# Patient Record
Sex: FEMALE | Race: WHITE | NOT HISPANIC OR LATINO | Employment: OTHER | URBAN - METROPOLITAN AREA
[De-identification: names, ages, dates, MRNs, and addresses within clinical notes are randomized per-mention and may not be internally consistent; named-entity substitution may affect disease eponyms.]

---

## 2017-03-04 ENCOUNTER — HOSPITAL ENCOUNTER (EMERGENCY)
Facility: CLINIC | Age: 82
Discharge: HOME | End: 2017-03-04
Attending: EMERGENCY MEDICINE | Admitting: EMERGENCY MEDICINE
Payer: MEDICARE

## 2017-03-04 ENCOUNTER — APPOINTMENT (OUTPATIENT)
Dept: GENERAL RADIOLOGY | Facility: CLINIC | Age: 82
End: 2017-03-04
Attending: EMERGENCY MEDICINE
Payer: MEDICARE

## 2017-03-04 VITALS
RESPIRATION RATE: 20 BRPM | OXYGEN SATURATION: 97 % | HEART RATE: 68 BPM | BODY MASS INDEX: 26.58 KG/M2 | TEMPERATURE: 98 F | SYSTOLIC BLOOD PRESSURE: 132 MMHG | WEIGHT: 150 LBS | DIASTOLIC BLOOD PRESSURE: 96 MMHG | HEIGHT: 63 IN

## 2017-03-04 DIAGNOSIS — D64.9 ANEMIA, UNSPECIFIED TYPE: ICD-10-CM

## 2017-03-04 DIAGNOSIS — J06.9 VIRAL URI: ICD-10-CM

## 2017-03-04 DIAGNOSIS — R06.00 DYSPNEA, UNSPECIFIED TYPE: ICD-10-CM

## 2017-03-04 LAB
ANION GAP SERPL CALCULATED.3IONS-SCNC: 6 MMOL/L (ref 3–14)
BASOPHILS # BLD AUTO: 0 10E9/L (ref 0–0.2)
BASOPHILS NFR BLD AUTO: 0.3 %
BUN SERPL-MCNC: 26 MG/DL (ref 7–30)
CALCIUM SERPL-MCNC: 8.1 MG/DL (ref 8.5–10.1)
CHLORIDE SERPL-SCNC: 108 MMOL/L (ref 94–109)
CO2 SERPL-SCNC: 27 MMOL/L (ref 20–32)
CREAT SERPL-MCNC: 0.67 MG/DL (ref 0.52–1.04)
DIFFERENTIAL METHOD BLD: ABNORMAL
EOSINOPHIL # BLD AUTO: 0 10E9/L (ref 0–0.7)
EOSINOPHIL NFR BLD AUTO: 0.9 %
ERYTHROCYTE [DISTWIDTH] IN BLOOD BY AUTOMATED COUNT: 13 % (ref 10–15)
FLUAV+FLUBV AG SPEC QL: NEGATIVE
FLUAV+FLUBV AG SPEC QL: NORMAL
GFR SERPL CREATININE-BSD FRML MDRD: 85 ML/MIN/1.7M2
GLUCOSE SERPL-MCNC: 91 MG/DL (ref 70–99)
HCT VFR BLD AUTO: 30.7 % (ref 35–47)
HGB BLD-MCNC: 10.4 G/DL (ref 11.7–15.7)
IMM GRANULOCYTES # BLD: 0 10E9/L (ref 0–0.4)
IMM GRANULOCYTES NFR BLD: 0.6 %
INTERPRETATION ECG - MUSE: NORMAL
LYMPHOCYTES # BLD AUTO: 1.5 10E9/L (ref 0.8–5.3)
LYMPHOCYTES NFR BLD AUTO: 45.6 %
MCH RBC QN AUTO: 31.9 PG (ref 26.5–33)
MCHC RBC AUTO-ENTMCNC: 33.9 G/DL (ref 31.5–36.5)
MCV RBC AUTO: 94 FL (ref 78–100)
MONOCYTES # BLD AUTO: 0.3 10E9/L (ref 0–1.3)
MONOCYTES NFR BLD AUTO: 7.9 %
NEUTROPHILS # BLD AUTO: 1.4 10E9/L (ref 1.6–8.3)
NEUTROPHILS NFR BLD AUTO: 44.7 %
NRBC # BLD AUTO: 0 10*3/UL
NRBC BLD AUTO-RTO: 0 /100
PLATELET # BLD AUTO: 167 10E9/L (ref 150–450)
POTASSIUM SERPL-SCNC: 3.8 MMOL/L (ref 3.4–5.3)
RBC # BLD AUTO: 3.26 10E12/L (ref 3.8–5.2)
SODIUM SERPL-SCNC: 141 MMOL/L (ref 133–144)
SPECIMEN SOURCE: NORMAL
TROPONIN I SERPL-MCNC: NORMAL UG/L (ref 0–0.04)
WBC # BLD AUTO: 3.2 10E9/L (ref 4–11)

## 2017-03-04 PROCEDURE — 99285 EMERGENCY DEPT VISIT HI MDM: CPT | Mod: 25

## 2017-03-04 PROCEDURE — 84484 ASSAY OF TROPONIN QUANT: CPT | Performed by: EMERGENCY MEDICINE

## 2017-03-04 PROCEDURE — 85025 COMPLETE CBC W/AUTO DIFF WBC: CPT | Performed by: EMERGENCY MEDICINE

## 2017-03-04 PROCEDURE — 25000132 ZZH RX MED GY IP 250 OP 250 PS 637: Mod: GY | Performed by: EMERGENCY MEDICINE

## 2017-03-04 PROCEDURE — 87804 INFLUENZA ASSAY W/OPTIC: CPT | Performed by: EMERGENCY MEDICINE

## 2017-03-04 PROCEDURE — 80048 BASIC METABOLIC PNL TOTAL CA: CPT | Performed by: EMERGENCY MEDICINE

## 2017-03-04 PROCEDURE — 96361 HYDRATE IV INFUSION ADD-ON: CPT

## 2017-03-04 PROCEDURE — 25000128 H RX IP 250 OP 636: Performed by: EMERGENCY MEDICINE

## 2017-03-04 PROCEDURE — 93005 ELECTROCARDIOGRAM TRACING: CPT

## 2017-03-04 PROCEDURE — 71020 XR CHEST 2 VW: CPT

## 2017-03-04 PROCEDURE — A9270 NON-COVERED ITEM OR SERVICE: HCPCS | Mod: GY | Performed by: EMERGENCY MEDICINE

## 2017-03-04 PROCEDURE — 96360 HYDRATION IV INFUSION INIT: CPT

## 2017-03-04 RX ORDER — SODIUM CHLORIDE 9 MG/ML
1000 INJECTION, SOLUTION INTRAVENOUS CONTINUOUS
Status: DISCONTINUED | OUTPATIENT
Start: 2017-03-04 | End: 2017-03-05 | Stop reason: HOSPADM

## 2017-03-04 RX ORDER — ASPIRIN 81 MG/1
162 TABLET, CHEWABLE ORAL ONCE
Status: COMPLETED | OUTPATIENT
Start: 2017-03-04 | End: 2017-03-04

## 2017-03-04 RX ADMIN — ASPIRIN 81 MG 162 MG: 81 TABLET ORAL at 21:44

## 2017-03-04 RX ADMIN — SODIUM CHLORIDE 500 ML: 9 INJECTION, SOLUTION INTRAVENOUS at 21:44

## 2017-03-04 RX ADMIN — SODIUM CHLORIDE 1000 ML: 9 INJECTION, SOLUTION INTRAVENOUS at 21:45

## 2017-03-04 NOTE — ED AVS SNAPSHOT
Emergency Department    [address] [place] [id number]    Phone:  [phone]    Fax:  [phone]                                       Quiana Dunaway   MRN: [id number]    Department:   Emergency Department   Date of Visit:  3/4/2017           After Visit Summary Signature Page     I have received my discharge instructions, and my questions have been answered. I have discussed any challenges I see with this plan with the nurse or doctor.    ..........................................................................................................................................  Patient/Patient Representative Signature      ..........................................................................................................................................  Patient Representative Print Name and Relationship to Patient    ..................................................               ................................................  Date                                            Time    ..........................................................................................................................................  Reviewed by Signature/Title    ...................................................              ..............................................  Date                                                            Time

## 2017-03-04 NOTE — ED AVS SNAPSHOT
Emergency Department    [address] [facility] [address]    Phone:  [phone]    Fax:  [phone]                                       Quiana Dunaway   MRN: [id number]    Department:   Emergency Department   Date of Visit:  3/4/2017           Patient Information     Date Of Birth          1935        Your diagnoses for this visit were:     Dyspnea, unspecified type     Viral URI     Anemia, unspecified type        You were seen by Effie Jose MD.      Follow-up Information     Follow up with César Chung MD.    Specialty:  Internal Medicine    Why:  this week    Contact information:    [facility]  [address] [place] [id number] [phone]          Discharge Instructions       You will receive a call to set up a stress test.  Please return if you have any chest pain or worsening symptoms.   You need to see Dr. Chung and consider seeing Hematologist for your anemia.  Discharge Instructions  Chest Pain    You have been seen today for chest pain or discomfort.  At this time, your doctor has found no signs that your chest pain is due to a serious or life-threatening condition, (or you have declined more testing and/or admission to the hospital). However, sometimes there is a serious problem that does not show up right away. Your evaluation today may not be complete and you may need further testing and evaluation.     You need to follow-up with your regular doctor within 3 days.    Return to the Emergency Department if:    Your chest pain changes, gets worse, starts to happen more often, or comes with less activity.    You are short of breath.    You get very weak or tired.    You pass out or faint.    You have any new symptoms, like fever, cough, numb legs, or you cough up blood.    You have anything else that worries you.    Until you follow-up with your regular doctor please do the following:    Take one aspirin daily unless you have an allergy or are  told not to by your doctor.    If a stress test appointment has been made, go to the appointment.    If you have questions, contact your regular doctor.    If your doctor today has told you to follow-up with your regular doctor, it is very important that you make an appointment with your clinic and go to the appointment.  If you do not follow-up with your primary doctor, it may result in missing an important development which could result in permanent injury or disability and/or lasting pain.  If there is any problem keeping your appointment, call your doctor or return to the Emergency Department.    If you were given a prescription for medicine here today, be sure to read all of the information (including the package insert) that comes with your prescription.  This will include important information about the medicine, its side effects, and any warnings that you need to know about.  The pharmacist who fills the prescription can provide more information and answer questions you may have about the medicine.  If you have questions or concerns that the pharmacist cannot address, please call or return to the Emergency Department.     Opioid Medication Information    Pain medications are among the most commonly prescribed medicines, so we are including this information for all our patients. If you did not receive pain medication or get a prescription for pain medicine, you can ignore it.     You may have been given a prescription for an opioid (narcotic) pain medicine and/or have received a pain medicine while here in the Emergency Department. These medicines can make you drowsy or impaired. You must not drive, operate dangerous equipment, or engage in any other dangerous activities while taking these medications. If you drive while taking these medications, you could be arrested for DUI, or driving under the influence. Do not drink any alcohol while you are taking these medications.     Opioid pain medications can cause  addiction. If you have a history of chemical dependency of any type, you are at a higher risk of becoming addicted to pain medications.  Only take these prescribed medications to treat your pain when all other options have been tried. Take it for as short a time and as few doses as possible. Store your pain pills in a secure place, as they are frequently stolen and provide a dangerous opportunity for children or visitors in your house to start abusing these powerful medications. We will not replace any lost or stolen medicine.  As soon as your pain is better, you should flush all your remaining medication.     Many prescription pain medications contain Tylenol  (acetaminophen), including Vicodin , Tylenol #3 , Norco , Lortab , and Percocet .  You should not take any extra pills of Tylenol  if you are using these prescription medications or you can get very sick.  Do not ever take more than 3000 mg of acetaminophen in any 24 hour period.    All opioids tend to cause constipation. Drink plenty of water and eat foods that have a lot of fiber, such as fruits, vegetables, prune juice, apple juice and high fiber cereal.  Take a laxative if you don t move your bowels at least every other day. Miralax , Milk of Magnesia, Colace , or Senna  can be used to keep you regular.      Remember that you can always come back to the Emergency Department if you are not able to see your regular doctor in the amount of time listed above, if you get any new symptoms, or if there is anything that worries you.        Anemia  Anemia is a condition that occurs when your body does not have enough healthy red blood cells (RBCs). Your RBCs are the parts of your blood that carry oxygen throughout your body. A protein called hemoglobin allows your RBCs to absorb and release oxygen. Without enough RBCs or hemoglobin, your body doesn't get enough oxygen. Symptoms of anemia may then occur.    Symptoms of anemia  Some people with anemia have no  symptoms. But most people have symptoms that range from mild to severe. These can include:    Tiredness (fatigue)    Weakness    Pale skin    Shortness of breath    Dizziness or fainting    Rapid heartbeat    Trouble doing normal amounts of activity    Jaundice (yellowing of your eyes, skin, or mouth; dark urine)  Causes of anemia  Anemia can occur when your body:    Loses too much blood    Does not make enough RBCs    Destroys your RBCs at a faster rate than it can replace them    Does not make a normal amount of hemoglobin in your RBCs  These problems can occur for many reasons, including:    A condition that you are born with (congenital or inherited). This includes sickle cell disease or thalassemia.    Heavy bleeding for any reason, including injury, surgery, childbirth, or even heavy menstrual periods.    Being low in certain nutrients, such as iron, folate, or vitamin B12. This may be due to poor diet. Also, a condition like celiac disease or Crohn's disease can cause poor absorption of these nutrients    Certain chronic conditions like diabetes, arthritis, or kidney disease.    Certain chronic infections like tuberculosis or HIV.    Exposure to certain medications, such as those used for chemotherapy.  There are different types of anemia. Your doctor can tell you more about the type of anemia you have and what may have caused it.  Diagnosing anemia  To diagnose anemia, your doctor gives you blood tests. These can include:    Complete blood cell count (CBC). This test measures the amounts of the different types of blood cells.    Blood smear. This test checks the size and shape of your blood cells. To perform the test, your doctor views a drop of your blood under a microscope. Your doctor uses a stain to make the blood cells easier to see.    Iron studies. These tests measure the amount of iron in your blood. Your body needs iron to make hemoglobin in your RBCs.    Vitamin B12 and folate studies. These tests  check for some of the components that help give RBCs a normal size and shape.    Reticulocyte count. This test measures the amount of new RBCs that your bone marrow makes.    Hemoglobin electrophoresis. This test checks for problems with your hemoglobin in RBCs.  Treating anemia  Treatment for anemia is based on the type of anemia, its cause, and the severity of your symptoms. Treatments may include:    Diet changes. This involves increasing the amount of certain nutrients in your diet, such as iron, vitamin B12, or folate. Your doctor may also prescribe nutrient supplements.    Medications. Certain medications treat the cause of your anemia. Others help build new RBCs or relieve symptoms. If a medication is the cause of your anemia, you may need to stop or change it.    Blood transfusions. Replacing some of your blood can increase the number of healthy RBCs in your body.    Surgery. In some cases, your doctor can do surgery to treat the underlying cause of anemia. If you need surgery, your doctor will explain the procedure and outline the risks and benefits for you.  Long-term concerns  If you have a certain type of anemia, you can expect a full recovery after treatment. If you have other types of anemia (especially a type you're born with), you will need to manage it for life. Your doctor can tell you more.    3895-6660 [employer]. [address] [place] [address], [place] [address]. All rights reserved. This information is not intended as a substitute for professional medical care. Always follow your healthcare professional's instructions.          Anemia  Anemia is a condition that occurs when your body does not have enough healthy red blood cells (RBCs). Your RBCs are the parts of your blood that carry oxygen throughout your body. A protein called hemoglobin allows your RBCs to absorb and release oxygen. Without enough RBCs or hemoglobin, your body doesn't get enough oxygen. Symptoms of anemia may then  occur.    Symptoms of anemia  Some people with anemia have no symptoms. But most people have symptoms that range from mild to severe. These can include:    Tiredness (fatigue)    Weakness    Pale skin    Shortness of breath    Dizziness or fainting    Rapid heartbeat    Trouble doing normal amounts of activity    Jaundice (yellowing of your eyes, skin, or mouth; dark urine)  Causes of anemia  Anemia can occur when your body:    Loses too much blood    Does not make enough RBCs    Destroys your RBCs at a faster rate than it can replace them    Does not make a normal amount of hemoglobin in your RBCs  These problems can occur for many reasons, including:    A condition that you are born with (congenital or inherited). This includes sickle cell disease or thalassemia.    Heavy bleeding for any reason, including injury, surgery, childbirth, or even heavy menstrual periods.    Being low in certain nutrients, such as iron, folate, or vitamin B12. This may be due to poor diet. Also, a condition like celiac disease or Crohn's disease can cause poor absorption of these nutrients    Certain chronic conditions like diabetes, arthritis, or kidney disease.    Certain chronic infections like tuberculosis or HIV.    Exposure to certain medications, such as those used for chemotherapy.  There are different types of anemia. Your doctor can tell you more about the type of anemia you have and what may have caused it.  Diagnosing anemia  To diagnose anemia, your doctor gives you blood tests. These can include:    Complete blood cell count (CBC). This test measures the amounts of the different types of blood cells.    Blood smear. This test checks the size and shape of your blood cells. To perform the test, your doctor views a drop of your blood under a microscope. Your doctor uses a stain to make the blood cells easier to see.    Iron studies. These tests measure the amount of iron in your blood. Your body needs iron to make hemoglobin  in your RBCs.    Vitamin B12 and folate studies. These tests check for some of the components that help give RBCs a normal size and shape.    Reticulocyte count. This test measures the amount of new RBCs that your bone marrow makes.    Hemoglobin electrophoresis. This test checks for problems with your hemoglobin in RBCs.  Treating anemia  Treatment for anemia is based on the type of anemia, its cause, and the severity of your symptoms. Treatments may include:    Diet changes. This involves increasing the amount of certain nutrients in your diet, such as iron, vitamin B12, or folate. Your doctor may also prescribe nutrient supplements.    Medications. Certain medications treat the cause of your anemia. Others help build new RBCs or relieve symptoms. If a medication is the cause of your anemia, you may need to stop or change it.    Blood transfusions. Replacing some of your blood can increase the number of healthy RBCs in your body.    Surgery. In some cases, your doctor can do surgery to treat the underlying cause of anemia. If you need surgery, your doctor will explain the procedure and outline the risks and benefits for you.  Long-term concerns  If you have a certain type of anemia, you can expect a full recovery after treatment. If you have other types of anemia (especially a type you're born with), you will need to manage it for life. Your doctor can tell you more.    3611-9032 [employer]. [address] [place] [address], [place] [id number]. All rights reserved. This information is not intended as a substitute for professional medical care. Always follow your healthcare professional's instructions.      Discharge Instructions  Upper Respiratory Infection    The upper respiratory tract includes the sinuses, nasal passages, pharynx, and larynx. A URI, or upper respiratory infection, is an infection of any of the parts of the upper airway. Symptoms include runny nose, congestion, sore throat, cough, and fever.  URIs are almost always caused by a virus. Antibiotics do not help with virus infections, so are not used for an ordinary URI. A URI is very contagious through coughing and nasal secretions; make sure you wash your hands often and clean surfaces after sneezing, coughing or touching them.  Viruses can live on surfaces for up to 3 days.      Return to the Emergency Department if:    Any of the symptoms you have get much worse.    You seem very sick, like being too weak to get up.    You have any new symptoms, especially serious things like chest pain.     You are short of breath.     You have a severe headache.    You are vomiting so much you can t keep fluids or medicines down.    You have confusion or seem unusually drowsy.    You have a seizure or convulsion.    Follow-up:      You should start to improve in 3 - 5 days.  A cough can linger for up to six weeks, but overall you should be feeling much better.  See your doctor if you have a fever for more than 3 days, or if you are not feeling better within 5 days.      What can I do to help myself?    Fill any prescriptions the doctor gave you and take them right away    If you have a fever, get plenty of rest and drink lots of fluids, especially water. Using a humidifier or saline nose spray will also help loosen secretions.     What clothes or blankets you have on won t change your fever. Do what is comfortable for you.    Bathing or sponging in lukewarm water may help you feel better.    Tylenol  (acetaminophen), Motrin  (ibuprofen), or Advil  (ibuprofen) help bring fever down and may help you feel more comfortable. Be sure to read and follow the package directions, and ask your doctor if you have questions.    Do not drink alcohol.    Decongestants may help you feel better. You may use decongestant nose sprays Afrin  (oxymetazoline) or Rubens-Synephrine  (phenylephrine hydrochloride) for up to 3 days, or may use a decongestant tablet like Sudafed   (pseudoephedrine).  If you were given a prescription for medicine here today, be sure to read all of the information (including the package insert) that comes with your prescription.  This will include important information about the medicine, its side effects, and any warnings that you need to know about.  The pharmacist who fills the prescription can provide more information and answer questions you may have about the medicine.  If you have questions or concerns that the pharmacist cannot address, please call or return to the Emergency Department.   Opioid Medication Information    Pain medications are among the most commonly prescribed medicines, so we are including this information for all our patients. If you did not receive pain medication or get a prescription for pain medicine, you can ignore it.     You may have been given a prescription for an opioid (narcotic) pain medicine and/or have received a pain medicine while here in the Emergency Department. These medicines can make you drowsy or impaired. You must not drive, operate dangerous equipment, or engage in any other dangerous activities while taking these medications. If you drive while taking these medications, you could be arrested for DUI, or driving under the influence. Do not drink any alcohol while you are taking these medications.     Opioid pain medications can cause addiction. If you have a history of chemical dependency of any type, you are at a higher risk of becoming addicted to pain medications.  Only take these prescribed medications to treat your pain when all other options have been tried. Take it for as short a time and as few doses as possible. Store your pain pills in a secure place, as they are frequently stolen and provide a dangerous opportunity for children or visitors in your house to start abusing these powerful medications. We will not replace any lost or stolen medicine.  As soon as your pain is better, you should flush all  your remaining medication.     Many prescription pain medications contain Tylenol  (acetaminophen), including Vicodin , Tylenol #3 , Norco , Lortab , and Percocet .  You should not take any extra pills of Tylenol  if you are using these prescription medications or you can get very sick.  Do not ever take more than 3000 mg of acetaminophen in any 24 hour period.    All opioids tend to cause constipation. Drink plenty of water and eat foods that have a lot of fiber, such as fruits, vegetables, prune juice, apple juice and high fiber cereal.  Take a laxative if you don t move your bowels at least every other day. Miralax , Milk of Magnesia, Colace , or Senna  can be used to keep you regular.      Remember that you can always come back to the Emergency Department if you are not able to see your regular doctor in the amount of time listed above, if you get any new symptoms, or if there is anything that worries you.        24 Hour Appointment Hotline       To make an appointment at any [facility], call [phone] ([phone]). If you don't have a family doctor or clinic, we will help you find one. [facility] clinics are conveniently located to serve the needs of you and your family.          ED Discharge Orders     Exercise Stress Echocardiogram       The supervising Cardiologist may change the type of echocardiogram requested to answer a specific clinical question based on existing protocols in the Echocardiography laboratory.    Use of contrast is at the discretion of the supervising Cardiologist.                     Review of your medicines      Our records show that you are taking the medicines listed below. If these are incorrect, please call your family doctor or clinic.        Dose / Directions Last dose taken    aspirin 81 MG chewable tablet   Dose:  81 mg        Take 81 mg by mouth   Refills:  0        lisinopril 10 MG tablet   Commonly known as:  PRINIVIL/ZESTRIL   Dose:  10 mg   Quantity:  90 tablet         Take 1 tablet (10 mg) by mouth daily   Refills:  1                Procedures and tests performed during your visit     Basic metabolic panel    CBC with platelets differential    EKG 12-lead, tracing only    Influenza A/B antigen    Troponin I    XR Chest 2 Views      Orders Needing Specimen Collection     None      Pending Results     No orders found from 3/2/2017 to 3/5/2017.            Pending Culture Results     No orders found from 3/2/2017 to 3/5/2017.             Test Results from your hospital stay     3/4/2017  9:54 PM - Interface, Flexilab Results      Component Results     Component Value Ref Range & Units Status    WBC 3.2 (L) 4.0 - 11.0 10e9/L Final    RBC Count 3.26 (L) 3.8 - 5.2 10e12/L Final    Hemoglobin 10.4 (L) 11.7 - 15.7 g/dL Final    Hematocrit 30.7 (L) 35.0 - 47.0 % Final    MCV 94 78 - 100 fl Final    MCH 31.9 26.5 - 33.0 pg Final    MCHC 33.9 31.5 - 36.5 g/dL Final    RDW 13.0 10.0 - 15.0 % Final    Platelet Count 167 150 - 450 10e9/L Final    Diff Method Automated Method  Final    % Neutrophils 44.7 % Final    % Lymphocytes 45.6 % Final    % Monocytes 7.9 % Final    % Eosinophils 0.9 % Final    % Basophils 0.3 % Final    % Immature Granulocytes 0.6 % Final    Nucleated RBCs 0 0 /100 Final    Absolute Neutrophil 1.4 (L) 1.6 - 8.3 10e9/L Final    Absolute Lymphocytes 1.5 0.8 - 5.3 10e9/L Final    Absolute Monocytes 0.3 0.0 - 1.3 10e9/L Final    Absolute Eosinophils 0.0 0.0 - 0.7 10e9/L Final    Absolute Basophils 0.0 0.0 - 0.2 10e9/L Final    Abs Immature Granulocytes 0.0 0 - 0.4 10e9/L Final    Absolute Nucleated RBC 0.0  Final         3/4/2017 10:06 PM - Interface, Flexilab Results      Component Results     Component Value Ref Range & Units Status    Sodium 141 133 - 144 mmol/L Final    Potassium 3.8 3.4 - 5.3 mmol/L Final    Chloride 108 94 - 109 mmol/L Final    Carbon Dioxide 27 20 - 32 mmol/L Final    Anion Gap 6 3 - 14 mmol/L Final    Glucose 91 70 - 99 mg/dL Final    Urea  Nitrogen 26 7 - 30 mg/dL Final    Creatinine 0.67 0.52 - 1.04 mg/dL Final    GFR Estimate 85 >60 mL/min/1.7m2 Final    Non  GFR Calc    GFR Estimate If Black >90   GFR Calc   >60 mL/min/1.7m2 Final    Calcium 8.1 (L) 8.5 - 10.1 mg/dL Final         3/4/2017 10:17 PM - Interface, Radiant Ib      Narrative     XR CHEST 2 VW   3/4/2017 9:50 PM     HISTORY: cough    COMPARISON: Film dated 3/9/2013    FINDINGS: The heart is negative.  The lungs are clear. The pulmonary  vasculature is normal.  Degenerative changes seen throughout the  thoracic spine..        Impression     IMPRESSION: No active infiltrate. No significant change.        PINEDA ONEIL MD         3/4/2017  9:53 PM - Interface, Flexilab Results      Component Results     Component Value Ref Range & Units Status    Influenza A/B Agn Specimen Nares  Final    Influenza A Negative NEG Final    Influenza B  NEG Final    Negative   Test results must be correlated with clinical data. If necessary, results   should be confirmed by a molecular assay or viral culture.           3/4/2017 10:10 PM - Interface, Flexilab Results      Component Results     Component Value Ref Range & Units Status    Troponin I ES  0.000 - 0.045 ug/L Final    <0.015  The 99th percentile for upper reference range is 0.045 ug/L.  Troponin values in   the range of 0.045 - 0.120 ug/L may be associated with risks of adverse   clinical events.                  Clinical Quality Measure: Blood Pressure Screening     Your blood pressure was checked while you were in the emergency department today. The last reading we obtained was  BP: 145/68 . Please read the guidelines below about what these numbers mean and what you should do about them.  If your systolic blood pressure (the top number) is less than 120 and your diastolic blood pressure (the bottom number) is less than 80, then your blood pressure is normal. There is nothing more that you need to do about it.  If your  "systolic blood pressure (the top number) is 120-139 or your diastolic blood pressure (the bottom number) is 80-89, your blood pressure may be higher than it should be. You should have your blood pressure rechecked within a year by a primary care provider.  If your systolic blood pressure (the top number) is 140 or greater or your diastolic blood pressure (the bottom number) is 90 or greater, you may have high blood pressure. High blood pressure is treatable, but if left untreated over time it can put you at risk for heart attack, stroke, or kidney failure. You should have your blood pressure rechecked by a primary care provider within the next 4 weeks.  If your provider in the emergency department today gave you specific instructions to follow-up with your doctor or provider even sooner than that, you should follow that instruction and not wait for up to 4 weeks for your follow-up visit.        Thank you for choosing [facility]       Thank you for choosing [facility] for your care. Our goal is always to provide you with excellent care. Hearing back from our patients is one way we can continue to improve our services. Please take a few minutes to complete the written survey that you may receive in the mail after you visit with us. Thank you!        [employer] Information     [employer] lets you send messages to your doctor, view your test results, renew your prescriptions, schedule appointments and more. To sign up, go to [email or web address] . Click on \"Log in\" on the left side of the screen, which will take you to the Welcome page. Then click on \"Sign up Now\" on the right side of the page.     You will be asked to enter the access code listed below, as well as some personal information. Please follow the directions to create your username and password.     Your access code is: [email or web address]  Expires: 2017 11:04 PM     Your access code will  in 90 days. If you need help or a new code, please call your [facility] " [facility] or [phone].        Care EveryWhere ID     This is your Care EveryWhere ID. This could be used by other organizations to access your [facility] medical records  [id number]        After Visit Summary       This is your record. Keep this with you and show to your community pharmacist(s) and doctor(s) at your next visit.

## 2017-03-05 NOTE — DISCHARGE INSTRUCTIONS
You will receive a call to set up a stress test.  Please return if you have any chest pain or worsening symptoms.   You need to see Dr. Chung and consider seeing Hematologist for your anemia.  Discharge Instructions  Chest Pain    You have been seen today for chest pain or discomfort.  At this time, your doctor has found no signs that your chest pain is due to a serious or life-threatening condition, (or you have declined more testing and/or admission to the hospital). However, sometimes there is a serious problem that does not show up right away. Your evaluation today may not be complete and you may need further testing and evaluation.     You need to follow-up with your regular doctor within 3 days.    Return to the Emergency Department if:    Your chest pain changes, gets worse, starts to happen more often, or comes with less activity.    You are short of breath.    You get very weak or tired.    You pass out or faint.    You have any new symptoms, like fever, cough, numb legs, or you cough up blood.    You have anything else that worries you.    Until you follow-up with your regular doctor please do the following:    Take one aspirin daily unless you have an allergy or are told not to by your doctor.    If a stress test appointment has been made, go to the appointment.    If you have questions, contact your regular doctor.    If your doctor today has told you to follow-up with your regular doctor, it is very important that you make an appointment with your clinic and go to the appointment.  If you do not follow-up with your primary doctor, it may result in missing an important development which could result in permanent injury or disability and/or lasting pain.  If there is any problem keeping your appointment, call your doctor or return to the Emergency Department.    If you were given a prescription for medicine here today, be sure to read all of the information (including the package insert) that comes with  your prescription.  This will include important information about the medicine, its side effects, and any warnings that you need to know about.  The pharmacist who fills the prescription can provide more information and answer questions you may have about the medicine.  If you have questions or concerns that the pharmacist cannot address, please call or return to the Emergency Department.     Opioid Medication Information    Pain medications are among the most commonly prescribed medicines, so we are including this information for all our patients. If you did not receive pain medication or get a prescription for pain medicine, you can ignore it.     You may have been given a prescription for an opioid (narcotic) pain medicine and/or have received a pain medicine while here in the Emergency Department. These medicines can make you drowsy or impaired. You must not drive, operate dangerous equipment, or engage in any other dangerous activities while taking these medications. If you drive while taking these medications, you could be arrested for DUI, or driving under the influence. Do not drink any alcohol while you are taking these medications.     Opioid pain medications can cause addiction. If you have a history of chemical dependency of any type, you are at a higher risk of becoming addicted to pain medications.  Only take these prescribed medications to treat your pain when all other options have been tried. Take it for as short a time and as few doses as possible. Store your pain pills in a secure place, as they are frequently stolen and provide a dangerous opportunity for children or visitors in your house to start abusing these powerful medications. We will not replace any lost or stolen medicine.  As soon as your pain is better, you should flush all your remaining medication.     Many prescription pain medications contain Tylenol  (acetaminophen), including Vicodin , Tylenol #3 , Norco , Lortab , and Percocet .   You should not take any extra pills of Tylenol  if you are using these prescription medications or you can get very sick.  Do not ever take more than 3000 mg of acetaminophen in any 24 hour period.    All opioids tend to cause constipation. Drink plenty of water and eat foods that have a lot of fiber, such as fruits, vegetables, prune juice, apple juice and high fiber cereal.  Take a laxative if you don t move your bowels at least every other day. Miralax , Milk of Magnesia, Colace , or Senna  can be used to keep you regular.      Remember that you can always come back to the Emergency Department if you are not able to see your regular doctor in the amount of time listed above, if you get any new symptoms, or if there is anything that worries you.        Anemia  Anemia is a condition that occurs when your body does not have enough healthy red blood cells (RBCs). Your RBCs are the parts of your blood that carry oxygen throughout your body. A protein called hemoglobin allows your RBCs to absorb and release oxygen. Without enough RBCs or hemoglobin, your body doesn't get enough oxygen. Symptoms of anemia may then occur.    Symptoms of anemia  Some people with anemia have no symptoms. But most people have symptoms that range from mild to severe. These can include:    Tiredness (fatigue)    Weakness    Pale skin    Shortness of breath    Dizziness or fainting    Rapid heartbeat    Trouble doing normal amounts of activity    Jaundice (yellowing of your eyes, skin, or mouth; dark urine)  Causes of anemia  Anemia can occur when your body:    Loses too much blood    Does not make enough RBCs    Destroys your RBCs at a faster rate than it can replace them    Does not make a normal amount of hemoglobin in your RBCs  These problems can occur for many reasons, including:    A condition that you are born with (congenital or inherited). This includes sickle cell disease or thalassemia.    Heavy bleeding for any reason, including  injury, surgery, childbirth, or even heavy menstrual periods.    Being low in certain nutrients, such as iron, folate, or vitamin B12. This may be due to poor diet. Also, a condition like celiac disease or Crohn's disease can cause poor absorption of these nutrients    Certain chronic conditions like diabetes, arthritis, or kidney disease.    Certain chronic infections like tuberculosis or HIV.    Exposure to certain medications, such as those used for chemotherapy.  There are different types of anemia. Your doctor can tell you more about the type of anemia you have and what may have caused it.  Diagnosing anemia  To diagnose anemia, your doctor gives you blood tests. These can include:    Complete blood cell count (CBC). This test measures the amounts of the different types of blood cells.    Blood smear. This test checks the size and shape of your blood cells. To perform the test, your doctor views a drop of your blood under a microscope. Your doctor uses a stain to make the blood cells easier to see.    Iron studies. These tests measure the amount of iron in your blood. Your body needs iron to make hemoglobin in your RBCs.    Vitamin B12 and folate studies. These tests check for some of the components that help give RBCs a normal size and shape.    Reticulocyte count. This test measures the amount of new RBCs that your bone marrow makes.    Hemoglobin electrophoresis. This test checks for problems with your hemoglobin in RBCs.  Treating anemia  Treatment for anemia is based on the type of anemia, its cause, and the severity of your symptoms. Treatments may include:    Diet changes. This involves increasing the amount of certain nutrients in your diet, such as iron, vitamin B12, or folate. Your doctor may also prescribe nutrient supplements.    Medications. Certain medications treat the cause of your anemia. Others help build new RBCs or relieve symptoms. If a medication is the cause of your anemia, you may need  to stop or change it.    Blood transfusions. Replacing some of your blood can increase the number of healthy RBCs in your body.    Surgery. In some cases, your doctor can do surgery to treat the underlying cause of anemia. If you need surgery, your doctor will explain the procedure and outline the risks and benefits for you.  Long-term concerns  If you have a certain type of anemia, you can expect a full recovery after treatment. If you have other types of anemia (especially a type you're born with), you will need to manage it for life. Your doctor can tell you more.    6933-8880 [employer]. [address] [place] [address]. All rights reserved. This information is not intended as a substitute for professional medical care. Always follow your healthcare professional's instructions.          Anemia  Anemia is a condition that occurs when your body does not have enough healthy red blood cells (RBCs). Your RBCs are the parts of your blood that carry oxygen throughout your body. A protein called hemoglobin allows your RBCs to absorb and release oxygen. Without enough RBCs or hemoglobin, your body doesn't get enough oxygen. Symptoms of anemia may then occur.    Symptoms of anemia  Some people with anemia have no symptoms. But most people have symptoms that range from mild to severe. These can include:    Tiredness (fatigue)    Weakness    Pale skin    Shortness of breath    Dizziness or fainting    Rapid heartbeat    Trouble doing normal amounts of activity    Jaundice (yellowing of your eyes, skin, or mouth; dark urine)  Causes of anemia  Anemia can occur when your body:    Loses too much blood    Does not make enough RBCs    Destroys your RBCs at a faster rate than it can replace them    Does not make a normal amount of hemoglobin in your RBCs  These problems can occur for many reasons, including:    A condition that you are born with (congenital or inherited). This includes sickle cell disease  or thalassemia.    Heavy bleeding for any reason, including injury, surgery, childbirth, or even heavy menstrual periods.    Being low in certain nutrients, such as iron, folate, or vitamin B12. This may be due to poor diet. Also, a condition like celiac disease or Crohn's disease can cause poor absorption of these nutrients    Certain chronic conditions like diabetes, arthritis, or kidney disease.    Certain chronic infections like tuberculosis or HIV.    Exposure to certain medications, such as those used for chemotherapy.  There are different types of anemia. Your doctor can tell you more about the type of anemia you have and what may have caused it.  Diagnosing anemia  To diagnose anemia, your doctor gives you blood tests. These can include:    Complete blood cell count (CBC). This test measures the amounts of the different types of blood cells.    Blood smear. This test checks the size and shape of your blood cells. To perform the test, your doctor views a drop of your blood under a microscope. Your doctor uses a stain to make the blood cells easier to see.    Iron studies. These tests measure the amount of iron in your blood. Your body needs iron to make hemoglobin in your RBCs.    Vitamin B12 and folate studies. These tests check for some of the components that help give RBCs a normal size and shape.    Reticulocyte count. This test measures the amount of new RBCs that your bone marrow makes.    Hemoglobin electrophoresis. This test checks for problems with your hemoglobin in RBCs.  Treating anemia  Treatment for anemia is based on the type of anemia, its cause, and the severity of your symptoms. Treatments may include:    Diet changes. This involves increasing the amount of certain nutrients in your diet, such as iron, vitamin B12, or folate. Your doctor may also prescribe nutrient supplements.    Medications. Certain medications treat the cause of your anemia. Others help build new RBCs or relieve symptoms.  If a medication is the cause of your anemia, you may need to stop or change it.    Blood transfusions. Replacing some of your blood can increase the number of healthy RBCs in your body.    Surgery. In some cases, your doctor can do surgery to treat the underlying cause of anemia. If you need surgery, your doctor will explain the procedure and outline the risks and benefits for you.  Long-term concerns  If you have a certain type of anemia, you can expect a full recovery after treatment. If you have other types of anemia (especially a type you're born with), you will need to manage it for life. Your doctor can tell you more.    2145-5401 [employer]. [address] [place] [address], [place] [address]. All rights reserved. This information is not intended as a substitute for professional medical care. Always follow your healthcare professional's instructions.      Discharge Instructions  Upper Respiratory Infection    The upper respiratory tract includes the sinuses, nasal passages, pharynx, and larynx. A URI, or upper respiratory infection, is an infection of any of the parts of the upper airway. Symptoms include runny nose, congestion, sore throat, cough, and fever. URIs are almost always caused by a virus. Antibiotics do not help with virus infections, so are not used for an ordinary URI. A URI is very contagious through coughing and nasal secretions; make sure you wash your hands often and clean surfaces after sneezing, coughing or touching them.  Viruses can live on surfaces for up to 3 days.      Return to the Emergency Department if:    Any of the symptoms you have get much worse.    You seem very sick, like being too weak to get up.    You have any new symptoms, especially serious things like chest pain.     You are short of breath.     You have a severe headache.    You are vomiting so much you can t keep fluids or medicines down.    You have confusion or seem unusually drowsy.    You have a seizure or  convulsion.    Follow-up:      You should start to improve in 3 - 5 days.  A cough can linger for up to six weeks, but overall you should be feeling much better.  See your doctor if you have a fever for more than 3 days, or if you are not feeling better within 5 days.      What can I do to help myself?    Fill any prescriptions the doctor gave you and take them right away    If you have a fever, get plenty of rest and drink lots of fluids, especially water. Using a humidifier or saline nose spray will also help loosen secretions.     What clothes or blankets you have on won t change your fever. Do what is comfortable for you.    Bathing or sponging in lukewarm water may help you feel better.    Tylenol  (acetaminophen), Motrin  (ibuprofen), or Advil  (ibuprofen) help bring fever down and may help you feel more comfortable. Be sure to read and follow the package directions, and ask your doctor if you have questions.    Do not drink alcohol.    Decongestants may help you feel better. You may use decongestant nose sprays Afrin  (oxymetazoline) or Rubens-Synephrine  (phenylephrine hydrochloride) for up to 3 days, or may use a decongestant tablet like Sudafed  (pseudoephedrine).  If you were given a prescription for medicine here today, be sure to read all of the information (including the package insert) that comes with your prescription.  This will include important information about the medicine, its side effects, and any warnings that you need to know about.  The pharmacist who fills the prescription can provide more information and answer questions you may have about the medicine.  If you have questions or concerns that the pharmacist cannot address, please call or return to the Emergency Department.   Opioid Medication Information    Pain medications are among the most commonly prescribed medicines, so we are including this information for all our patients. If you did not receive pain medication or get a prescription  for pain medicine, you can ignore it.     You may have been given a prescription for an opioid (narcotic) pain medicine and/or have received a pain medicine while here in the Emergency Department. These medicines can make you drowsy or impaired. You must not drive, operate dangerous equipment, or engage in any other dangerous activities while taking these medications. If you drive while taking these medications, you could be arrested for DUI, or driving under the influence. Do not drink any alcohol while you are taking these medications.     Opioid pain medications can cause addiction. If you have a history of chemical dependency of any type, you are at a higher risk of becoming addicted to pain medications.  Only take these prescribed medications to treat your pain when all other options have been tried. Take it for as short a time and as few doses as possible. Store your pain pills in a secure place, as they are frequently stolen and provide a dangerous opportunity for children or visitors in your house to start abusing these powerful medications. We will not replace any lost or stolen medicine.  As soon as your pain is better, you should flush all your remaining medication.     Many prescription pain medications contain Tylenol  (acetaminophen), including Vicodin , Tylenol #3 , Norco , Lortab , and Percocet .  You should not take any extra pills of Tylenol  if you are using these prescription medications or you can get very sick.  Do not ever take more than 3000 mg of acetaminophen in any 24 hour period.    All opioids tend to cause constipation. Drink plenty of water and eat foods that have a lot of fiber, such as fruits, vegetables, prune juice, apple juice and high fiber cereal.  Take a laxative if you don t move your bowels at least every other day. Miralax , Milk of Magnesia, Colace , or Senna  can be used to keep you regular.      Remember that you can always come back to the Emergency Department if you are  not able to see your regular doctor in the amount of time listed above, if you get any new symptoms, or if there is anything that worries you.

## 2017-03-05 NOTE — ED NOTES
"Road tested pt. Per  Request, pt. Was able to walk ok and said that \" she felt normal'  o2 after the walk was 95% on room air   "

## 2017-03-05 NOTE — ED NOTES
Pt presents to ED with c/o SOB since Thursday. On Thursday she also reports that she had a brief 30-60 minute episode of chest pain that self resolved; however, she has continued to felt fatigued. Also having a productive cough with yellow phlegm.    She denies nausea, vomiting, diarrhea, constipation, fevers, chills or other complaints. No recent sick contacts. Has received flu vaccine this flu season.

## 2017-03-05 NOTE — ED PROVIDER NOTES
CHIEF COMPLAINT:  Shortness of breath.      HISTORY OF PRESENT ILLNESS:  Quiana Dunaway is an 81-year-old otherwise healthy woman with hypertension who comes in complaining of shortness of breath.  I am getting different histories as she says she has been more short of breath for the last few days, but then perhaps last 2 weeks, and then I see records from her chart that she was actually seen last fall for shortness of breath.  She says she feels more tired than usual.  She has had a cough for the last couple weeks without fever, runny nose, headache or muscle aches.  She feels short of breath with exertion.  Several days ago, she did have an episode of some left upper chest pain which lasted about 20 minutes.  She denies any cardiac history.  Her only cardiac risk factor is hypertension.  She has seen her doctor's offices for the shortness of breath without definite cause found.  She has not had a stress test for several years.  She denies any blood loss.  She has not had a colonoscopy for many years and has not had any change in her stools.      PAST MEDICAL HISTORY:  Hypertension.      HABITS:  Tobacco, alcohol, drug use negative.      SOCIAL HISTORY:  She is here with her son, Primitivo.  She lives alone.  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      REVIEW OF SYSTEMS:  All other systems negative except as in HPI.      MEDICATIONS:  Lisinopril.      ALLERGIES:  None.      PHYSICAL EXAMINATION:   VITAL SIGNS:  Temperature 98, blood pressure 147/59, pulse 68, respirations 16, O2 sat 94% on room air.   GENERAL:  The patient does not appear dyspneic at rest.  She has an occasional cough.   HEENT:  TMs gray.  Pupils equal.  Pharynx normal.   NECK:  Supple.   CARDIOVASCULAR:  Bradycardic.   RESPIRATORY:  Lungs clear to  auscultation.   GASTROINTESTINAL:  Abdomen soft, nontender.  Rectal exam showed brown stool, which was guaiac negative.     NEUROLOGIC:  Grossly intact.   PSYCHIATRIC:  Alert and oriented.         LABORATORY DATA:  Hemoglobin is low at 10.4 compared to 12.6 in 2013, white count low at 3.2 with normal differential.  Basic metabolic is normal.  Flu swab negative.  Troponin negative.  Chest x-ray shows no acute disease.      EKG:  Sinus bradycardia, rate of 58, no acute ST-T changes, no significant change from 03/05/2016.       EMERGENCY DEPARTMENT COURSE:  The patient had aforementioned studies done.  She did not exhibit any hypoxia or dyspnea here.  She was ambulated and had O2 sats 96% after without any dyspnea.      MEDICAL DECISION MAKING:  The patient's main complaint is dyspnea.  Looking at her chart, it appears that this has been longstanding for several months.  She does not appear dyspneic here and has reasonable O2 saturations.  She is mildly anemic.  Certainly this could be causing some shortness of breath.  She also has a low white count, and her MCV is not low with the anemia, so I did consider hematologic cause of her fatigue, and she may need to have that followed up.  I considered cardiac cause in light of having the brief episode of chest pain.  She has a normal EKG now and has a normal troponin.  I talked with the patient about whether she wanted to stay in the hospital for stress testing.  She strongly prefers to go home.  She agrees to take it easy.  I will write an outpatient stress test.  She also agrees that if she has any recurrent chest pain or worsening symptoms, she will return.  I suspect that what sounds like viral URI is also contributing to some of her dyspnea.      ASSESSMENT:   1.  Dyspnea, unclear cause, needs further evaluation.   2.  Anemia, needs further evaluation.      DISPOSITION:  Home.      DISCHARGE INSTRUCTIONS:  An outpatient stress test, follow up with Dr. Chung after stress test.  Follow up sooner if worsening symptoms as noted above.         ANGIE SINGLETON MD             D: 03/04/2017 23:05   T: 03/05/2017 10:28   MT: [id number]      Name:     DUGLAS CASTILLO    MRN:      [id number]        Account:      [id number]   :      1935           Visit Date:   2017      Document: [id number]       cc: César Chung MD

## 2017-03-05 NOTE — ED NOTES
Patient discharged in stable condition. Patient received follow-up instructions and 0RXs. No questions at time of discharge. IV removed - catheter intact.

## 2017-03-06 ENCOUNTER — OFFICE VISIT (OUTPATIENT)
Dept: FAMILY MEDICINE | Facility: CLINIC | Age: 82
End: 2017-03-06
Payer: MEDICARE

## 2017-03-06 VITALS — WEIGHT: 150 LBS | HEIGHT: 63 IN | BODY MASS INDEX: 26.58 KG/M2 | TEMPERATURE: 98.2 F | OXYGEN SATURATION: 96 %

## 2017-03-06 DIAGNOSIS — Z09 HOSPITAL DISCHARGE FOLLOW-UP: Primary | ICD-10-CM

## 2017-03-06 DIAGNOSIS — R07.9 CHEST PAIN, UNSPECIFIED TYPE: ICD-10-CM

## 2017-03-06 DIAGNOSIS — Z80.0 FAMILY HISTORY OF COLON CANCER: ICD-10-CM

## 2017-03-06 DIAGNOSIS — Z12.11 SCREEN FOR COLON CANCER: ICD-10-CM

## 2017-03-06 DIAGNOSIS — D64.9 ANEMIA, UNSPECIFIED TYPE: ICD-10-CM

## 2017-03-06 DIAGNOSIS — R06.02 SHORTNESS OF BREATH: ICD-10-CM

## 2017-03-06 LAB
D DIMER PPP FEU-MCNC: 4.5 UG/ML FEU (ref 0–0.5)
HGB BLD-MCNC: 10.7 G/DL (ref 11.7–15.7)
TROPONIN I SERPL-MCNC: NORMAL UG/L (ref 0–0.04)

## 2017-03-06 PROCEDURE — 36415 COLL VENOUS BLD VENIPUNCTURE: CPT | Performed by: INTERNAL MEDICINE

## 2017-03-06 PROCEDURE — 84484 ASSAY OF TROPONIN QUANT: CPT | Performed by: NURSE PRACTITIONER

## 2017-03-06 PROCEDURE — 85379 FIBRIN DEGRADATION QUANT: CPT | Performed by: INTERNAL MEDICINE

## 2017-03-06 PROCEDURE — 99214 OFFICE O/P EST MOD 30 MIN: CPT | Performed by: NURSE PRACTITIONER

## 2017-03-06 PROCEDURE — 83550 IRON BINDING TEST: CPT | Performed by: NURSE PRACTITIONER

## 2017-03-06 PROCEDURE — 83540 ASSAY OF IRON: CPT | Performed by: NURSE PRACTITIONER

## 2017-03-06 PROCEDURE — 85018 HEMOGLOBIN: CPT | Performed by: NURSE PRACTITIONER

## 2017-03-06 PROCEDURE — 82728 ASSAY OF FERRITIN: CPT | Performed by: NURSE PRACTITIONER

## 2017-03-06 NOTE — PROGRESS NOTES
SUBJECTIVE:                                                    Quiana Dunaway is a 81 year old female who presents to clinic today for the following health issues:      ED/UC Followup:    Facility:  [facility]  Date of visit: 3/4/17  Reason for visit: dyspnea  MEDICAL DECISION MAKING: The patient's main complaint is dyspnea. Looking at her chart, it appears that this has been longstanding for several months. She does not appear dyspneic here and has reasonable O2 saturations. She is mildly anemic. Certainly this could be causing some shortness of breath. She also has a low white count, and her MCV is not low with the anemia, so I did consider hematologic cause of her fatigue, and she may need to have that followed up. I considered cardiac cause in light of having the brief episode of chest pain. She has a normal EKG now and has a normal troponin. I talked with the patient about whether she wanted to stay in the hospital for stress testing. She strongly prefers to go home. She agrees to take it easy. I will write an outpatient stress test. She also agrees that if she has any recurrent chest pain or worsening symptoms, she will return. I suspect that what sounds like viral URI is also contributing to some of her dyspnea.       ASSESSMENT:   1. Dyspnea, unclear cause, needs further evaluation.   2. Anemia, needs further evaluation.       DISPOSITION: Home.       Current Status: better   Still feels fatigued  Denies blood in stool  Had colonoscopy 11-12 years ago and thinks she had polyps  Sister  of colon cancer  Brother also had colon cancer    Had stress echo in  , no ischemia , normal stress test    Has appt iwht her cardio Dr Rios in 2 weeks    Problem list and histories reviewed & adjusted, as indicated.  Additional history: as documented    Patient Active Problem List   Diagnosis     Benign essential hypertension     No past surgical history on file.    Social History   Substance Use Topics     Smoking  "status: Never Smoker     Smokeless tobacco: Never Used     Alcohol use No     Family History   Problem Relation Age of Onset     Unknown/Adopted Mother      Unknown/Adopted Father          Current Outpatient Prescriptions   Medication Sig Dispense Refill     aspirin 81 MG chewable tablet Take 81 mg by mouth       lisinopril (PRINIVIL,ZESTRIL) 10 MG tablet Take 1 tablet (10 mg) by mouth daily 90 tablet 1     No Known Allergies    Reviewed and updated as needed this visit by clinical staff  Tobacco  Meds       Reviewed and updated as needed this visit by Provider         ROS:  Constitutional, HEENT, cardiovascular, pulmonary, gi and gu systems are negative, except as otherwise noted.    OBJECTIVE:                                                    Temp 98.2  F (36.8  C) (Oral)  Ht 5' 3\" (1.6 m)  Wt 150 lb (68 kg)  SpO2 96%  Breastfeeding? No  BMI 26.57 kg/m2  Body mass index is 26.57 kg/(m^2). no BP taken    GENERAL: healthy, alert and no distress  EYES: Eyes grossly normal to inspection, PERRL and conjunctivae and sclerae normal  HENT: ear canals and TM's normal, nose and mouth without ulcers or lesions  NECK: no adenopathy, no asymmetry, masses, or scars and thyroid normal to palpation  RESP: lungs clear to auscultation - no rales, rhonchi or wheezes  CV: regular rate and rhythm, normal S1 S2, no S3 or S4, no murmur, click or rub, no peripheral edema and peripheral pulses strong  ABDOMEN: soft, nontender, no hepatosplenomegaly, no masses and bowel sounds normal  MS: no gross musculoskeletal defects noted, no edema  NEURO: Normal strength and tone, mentation intact and speech normal  PSYCH: mentation appears normal, affect normal/bright    Diagnostic Test Results:  pending     ASSESSMENT/PLAN:                                                        ICD-10-CM    1. Hospital discharge follow-up Z09    2. Screen for colon cancer Z12.11 Fecal colorectal cancer screen (FIT)   3. Family history of colon cancer Z80.0 " GASTROENTEROLOGY ADULT REF PROCEDURE ONLY   4. Anemia, unspecified type D64.9 Hemoglobin     Iron and iron binding capacity     Ferritin     GASTROENTEROLOGY ADULT REF PROCEDURE ONLY   5. Shortness of breath R06.02    6. Chest pain, unspecified type R07.9 Troponin I     D dimer, quantitative   will follow up with cardiology as planned  Additional testing for anemia ordered   Will follow up with Dr Chung after her colonoscopy    MARIUSZ Love [facility]

## 2017-03-06 NOTE — MR AVS SNAPSHOT
After Visit Summary   3/6/2017    Quiana Dunaway    MRN: [id number]           Patient Information     Date Of Birth          1935        Visit Information        Provider Department      3/6/2017 3:00 PM Jael Chery APRN [facility]        Today's Diagnoses     Hospital discharge follow-up    -  1    Screen for colon cancer        Family history of colon cancer        Anemia, unspecified type        Shortness of breath        Chest pain, unspecified type           Follow-ups after your visit        Additional Services     GASTROENTEROLOGY ADULT REF PROCEDURE ONLY       Last Lab Result: Creatinine (mg/dL)       Date                     Value                 03/04/2017               0.67             ----------  Body mass index is 26.57 kg/(m^2).     Needed:  No  Language:  English    Patient will be contacted to schedule procedure.     Please be aware that coverage of these services is subject to the terms and limitations of your health insurance plan.  Call member services at your health plan with any benefit or coverage questions.  Any procedures must be performed at a [facility] facility OR coordinated by your clinic's referral office.    Please bring the following with you to your appointment:    (1) Any X-Rays, CTs or MRIs which have been performed.  Contact the facility where they were done to arrange for  prior to your scheduled appointment.    (2) List of current medications   (3) This referral request   (4) Any documents/labs given to you for this referral                  Follow-up notes from your care team     Return in about 2 weeks (around 3/20/2017).      Future tests that were ordered for you today     Open Future Orders        Priority Expected Expires Ordered    Fecal colorectal cancer screen (FIT) Routine 3/27/2017 5/29/2017 3/6/2017            Who to contact     If you have questions or need follow up information about today's clinic visit  "or your schedule please contact [facility] directly at [phone].  Normal or non-critical lab and imaging results will be communicated to you by MyChart, letter or phone within 4 business days after the clinic has received the results. If you do not hear from us within 7 days, please contact the clinic through [employer] or phone. If you have a critical or abnormal lab result, we will notify you by phone as soon as possible.  Submit refill requests through "[employer] or call your pharmacy and they will forward the refill request to us. Please allow 3 business days for your refill to be completed.          Additional Information About Your Visit        [employer] Information     "[employer] lets you send messages to your doctor, view your test results, renew your prescriptions, schedule appointments and more. To sign up, go to [email or web address] . Click on \"Log in\" on the left side of the screen, which will take you to the Welcome page. Then click on \"Sign up Now\" on the right side of the page.     You will be asked to enter the access code listed below, as well as some personal information. Please follow the directions to create your username and password.     Your access code is: [email or web address]  Expires: 2017 11:04 PM     Your access code will  in 90 days. If you need help or a new code, please call your [facility] clinic or [phone].        Care EveryWhere ID     This is your Care EveryWhere ID. This could be used by other organizations to access your [facility] medical records  [id number]        Your Vitals Were     Temperature Height Pulse Oximetry Breastfeeding? BMI (Body Mass Index)       98.2  F (36.8  C) (Oral) 5' 3\" (1.6 m) 96% No 26.57 kg/m2        Blood Pressure from Last 3 Encounters:   17 (!) 132/96   10/26/16 160/64   10/11/16 119/72    Weight from Last 3 Encounters:   17 150 lb (68 kg)   17 150 lb (68 kg)   10/26/16 158 lb (71.7 kg)              We Performed the " Following     D dimer, quantitative     Ferritin     GASTROENTEROLOGY ADULT REF PROCEDURE ONLY     Hemoglobin     Iron and iron binding capacity     Troponin I        Primary Care Provider Office Phone # Fax #    César Chung [phone]       [facility] [address]  [facility] [address]        Thank you!     Thank you for choosing [facility]  for your care. Our goal is always to provide you with excellent care. Hearing back from our patients is one way we can continue to improve our services. Please take a few minutes to complete the written survey that you may receive in the mail after your visit with us. Thank you!             Your Updated Medication List - Protect others around you: Learn how to safely use, store and throw away your medicines at www.disposemymeds.org.          This list is accurate as of: 3/6/17  5:18 PM.  Always use your most recent med list.                   Brand Name Dispense Instructions for use    aspirin 81 MG chewable tablet      Take 81 mg by mouth       lisinopril 10 MG tablet    PRINIVIL/ZESTRIL    90 tablet    Take 1 tablet (10 mg) by mouth daily

## 2017-03-06 NOTE — LETTER
[address]  [place] [address]  [phone]                                                                                               Date: 3/10/2017    Quiana Dunaway                                                                               [address] [place] [id number]              Dear Quiana,    Please be sure to schedule the colonoscopy ASAP.  If you have not received a call from the office to schedule then call to leave me or Dr Chung a message.  We must make sure that the anemia is not related to a polyp or cancer in the colon  Enclosed is a copy of your results.      It was a pleasure to see you at your last appointment. If you have any questions, please feel free to call myself or my nurse at [phone].          Sincerely,    Jael Chery NP/ Kya LIGHT, CMA  Results for orders placed or performed in visit on 03/06/17   Hemoglobin   Result Value Ref Range    Hemoglobin 10.7 (L) 11.7 - 15.7 g/dL   Troponin I   Result Value Ref Range    Troponin I ES  0.000 - 0.045 ug/L     <0.015  The 99th percentile for upper reference range is 0.045 ug/L.  Troponin values in   the range of 0.045 - 0.120 ug/L may be associated with risks of adverse   clinical events.     D dimer, quantitative   Result Value Ref Range    D Dimer 4.5 (H) 0.0 - 0.50 ug/ml FEU   Iron and iron binding capacity   Result Value Ref Range    Iron 69 35 - 180 ug/dL    Iron Binding Cap 360 240 - 430 ug/dL    Iron Saturation Index 19 15 - 46 %   Ferritin   Result Value Ref Range    Ferritin 92 8 - 252 ng/mL

## 2017-03-07 ENCOUNTER — TELEPHONE (OUTPATIENT)
Dept: FAMILY MEDICINE | Facility: CLINIC | Age: 82
End: 2017-03-07

## 2017-03-07 ENCOUNTER — HOSPITAL ENCOUNTER (OUTPATIENT)
Dept: CT IMAGING | Facility: CLINIC | Age: 82
Discharge: HOME | End: 2017-03-07
Attending: NURSE PRACTITIONER | Admitting: NURSE PRACTITIONER
Payer: MEDICARE

## 2017-03-07 DIAGNOSIS — R06.02 SOB (SHORTNESS OF BREATH): Primary | ICD-10-CM

## 2017-03-07 DIAGNOSIS — R06.02 SOB (SHORTNESS OF BREATH): ICD-10-CM

## 2017-03-07 LAB
FERRITIN SERPL-MCNC: 92 NG/ML (ref 8–252)
IRON SATN MFR SERPL: 19 % (ref 15–46)
IRON SERPL-MCNC: 69 UG/DL (ref 35–180)
TIBC SERPL-MCNC: 360 UG/DL (ref 240–430)

## 2017-03-07 PROCEDURE — 25500064 ZZH RX 255 OP 636: Performed by: NURSE PRACTITIONER

## 2017-03-07 PROCEDURE — 71260 CT THORAX DX C+: CPT

## 2017-03-07 PROCEDURE — 25000125 ZZHC RX 250: Performed by: NURSE PRACTITIONER

## 2017-03-07 RX ORDER — IOPAMIDOL 755 MG/ML
59 INJECTION, SOLUTION INTRAVASCULAR ONCE
Status: COMPLETED | OUTPATIENT
Start: 2017-03-07 | End: 2017-03-07

## 2017-03-07 RX ADMIN — IOPAMIDOL 59 ML: 755 INJECTION, SOLUTION INTRAVENOUS at 15:28

## 2017-03-07 RX ADMIN — SODIUM CHLORIDE 61 ML: 9 INJECTION, SOLUTION INTRAVENOUS at 15:27

## 2017-03-07 NOTE — TELEPHONE ENCOUNTER
Note from Jael who saw pt yesterday: Please set Mrs Dunaway up for a CTA to R/O PE ASAP and call her on her business line to let her know when it is and let me know also=    Called pt who is avail any time.  Earliest avail schedule is 3pm   Advised pt NPO 2 hours, check in at welcome desk at 245, pt agreed.  Also advised to ED/911 if increase of SOB or CP before 3pm appt, pt agreed to this.  Gave call back number of physician line [phone]  Arleth Pichardo RN

## 2017-03-07 NOTE — LETTER
[address]  [address]  [place]  [address]  Tel: [phone]    March 8, 2017    Quiana Dunaway  [address] [place] [phone]        Dear Ms. Dunaway,    This is your copy of the CT angiogram which does not demonstrate a pulmonary embolism, thank goodness.   Once you have had the colonoscopy and your appointment with Dr Menendez please schedule a follow up appointment with Dr Chung     If you have any further questions or problems, please contact our office.      Sincerely,    Jael Chery, TIN/cw        Results for orders placed or performed during the hospital encounter of 03/07/17   CT Chest Pulmonary Embolism w Contrast    Narrative    CT CHEST PULMONARY EMBOLISM WITH CONTRAST   3/7/2017 3:28 PM    HISTORY: Dyspnea. Elevated D-dimer. Shortness of breath.    TECHNIQUE: Pulmonary embolism protocol was performed. 59 mL Isovue-370  were injected IV. After contrast injection, volumetric helical  acquisition was performed from the thoracic inlet through the upper  abdomen. Radiation dose for this scan was reduced using automated  exposure control, adjustment of the mA and/or kV according to patient  size, or iterative reconstruction technique.    COMPARISON: Chest radiograph performed 3/4/2017.    FINDINGS:  No evidence for pulmonary embolism. The thoracic aorta is  of normal caliber, without evidence for thoracic aortic aneurysm or  dissection. No pleural or pericardial effusions. No enlarged lymph  nodes are identified in the chest. Indeterminate left thyroid nodule  contains a coarse calcification, and measures 2.6 cm. Small calcified  granuloma in the right middle lobe of the lung. The lungs are  otherwise clear. Small hiatal hernia. 1.2 cm low-density lesion in the  pancreatic tail (series 5 image 132) is most likely cystic. Mild  degenerative changes throughout the thoracic spine.      Impression    IMPRESSION:   1. No evidence for pulmonary embolism or  thoracic aortic dissection.  2. Indeterminate left thyroid nodule measures 2.6 cm. Thyroid  ultrasound may be helpful for further evaluation.  3. A small cystic appearing lesion in the pancreatic tail measures 1.2  cm. Differential considerations include epithelial cyst, IPMN, or  other cystic pancreatic neoplasm. Recommend follow-up pancreatic MRI  with MRCP in one year.   4. Small hiatal hernia.        DANN ACOSTA MD

## 2017-03-08 NOTE — PROGRESS NOTES
This is your copy of the CT angiogram which does not demonstrate a pulmonary embolism, thank goodness.   Once you have had the colonoscopy and your appointment with Dr Menendez please schedule a follow up appointment with Dr hCung

## 2017-03-09 ENCOUNTER — TELEPHONE (OUTPATIENT)
Dept: FAMILY MEDICINE | Facility: CLINIC | Age: 82
End: 2017-03-09

## 2017-03-09 DIAGNOSIS — D50.9 IRON DEFICIENCY ANEMIA, UNSPECIFIED IRON DEFICIENCY ANEMIA TYPE: Primary | ICD-10-CM

## 2017-03-09 NOTE — TELEPHONE ENCOUNTER
Reason for Call:  Other call back    Detailed comments: pt said that she was suppose to get a call back.  About her lab resutls.    Phone Number Patient can be reached at: Home number on file [phone] (office until 1pm)    Best Time: anytime    Can we leave a detailed message on this number? YES    Call taken on 3/9/2017 at 10:58 AM by Daija Chang

## 2017-03-09 NOTE — TELEPHONE ENCOUNTER
"I called work number below. Patient had already left for the day.  I called home phone and it continued to ring.   No voicemail set-up. Could not leave message  It asks \"Please enter your remote access code\"    Patient will need to be recalled at home phone number.    Magali Villegas RN    "

## 2017-03-09 NOTE — PROGRESS NOTES
Please be sure to schedule the colonoscopy ASAP.  If you have not received a call from the office to schedule then call to leave me or Dr Chung a message.  We must make sure that the anemia is not related to a polyp or cancer in the colon.

## 2017-03-09 NOTE — TELEPHONE ENCOUNTER
Her chest scan is negative for dangerous blood clots in the chest.    There are some incidental findings that she should discuss with me in a clinic visit, can you help her schedule an appointment

## 2017-03-10 NOTE — TELEPHONE ENCOUNTER
I called Quiana    We reviewed the results of her CT    Her anemia was identified in July in my old clinic and I told her to get a colonoscopy back then    She really needs a colonoscopy given her symptoms and her family hx     I explained this to her again, and this time she agrees to set it up, she wants to have it Anai    I placed a request, but I think Jael may have also already done this    Her thyroid nodule may have been previously evaluated in my old clinic and follow up for pancreatic findings needs to be outlined, although I discussed the thyroid nodule and pancreatic findings with her today by phone    I think the best thing would be for her to schedule an office visit appointment with me to discuss her most recent issues, I will have my staff try to get her scheduled

## 2017-03-13 ENCOUNTER — OFFICE VISIT (OUTPATIENT)
Dept: FAMILY MEDICINE | Facility: CLINIC | Age: 82
End: 2017-03-13
Payer: MEDICARE

## 2017-03-13 VITALS
TEMPERATURE: 98.1 F | HEART RATE: 75 BPM | OXYGEN SATURATION: 95 % | BODY MASS INDEX: 28.99 KG/M2 | SYSTOLIC BLOOD PRESSURE: 142 MMHG | DIASTOLIC BLOOD PRESSURE: 74 MMHG | HEIGHT: 63 IN | WEIGHT: 163.6 LBS

## 2017-03-13 DIAGNOSIS — R06.09 DOE (DYSPNEA ON EXERTION): ICD-10-CM

## 2017-03-13 DIAGNOSIS — D50.9 IRON DEFICIENCY ANEMIA, UNSPECIFIED IRON DEFICIENCY ANEMIA TYPE: Primary | ICD-10-CM

## 2017-03-13 PROCEDURE — 99214 OFFICE O/P EST MOD 30 MIN: CPT | Performed by: INTERNAL MEDICINE

## 2017-03-13 RX ORDER — ALPRAZOLAM 0.25 MG
0.25 TABLET ORAL
COMMUNITY
End: 2017-03-13

## 2017-03-13 RX ORDER — ESCITALOPRAM OXALATE 5 MG/1
TABLET ORAL
COMMUNITY
Start: 2016-03-20 | End: 2017-03-13

## 2017-03-13 NOTE — PROGRESS NOTES
SUBJECTIVE:                                                    Quiana Dunaway is a 81 year old female who presents to clinic today for the following health issues:      Chief Complaint   Patient presents with     Shortness of Breath     Results         This is a delightful 81-year-old female with history of hypertension and mild anxiety symptoms. She has been treated with Lexapro and alprazolam in the past for her anxiety. She is not taking either of those medications currently. She has a long history of symptoms of shortness of breath that are intermittent and usually associated with exertion. This dates back several years.  In 2013 she did have a negative nuclear medicine stress test. In 2014 she had an normal resting echocardiogram. In 2015 she had a negative CT scan of the chest to look for pulmonary embolism. Several months ago, she saw Dr. Martinez Rios from cardiology who obtained a resting echocardiogram which was normal. I reviewed those results from [facility] on care everywhere with the patient and her daughter today. Most recently, she saw a nurse practitioner in this office who referred her for a CT scan to exclude pulmonary embolism the results of which were reviewed with the patient today. The CT scan did not demonstrate an etiology for her dyspnea complaints and did show a stable thyroid nodule when compared to the CT scan in 2015. An incidental pancreatic cyst was noted as well and one-year follow-up with MRI was recommended. These findings were discussed with the patient and her daughter today in clinic. The patient continues to have intermittent episodes of shortness of breath sometimes associated with exertion and sometimes associated with rest. The patient denies feeling anxious or nervous at the time of these episodes. She denies associated chest pains or palpitations. She denies orthopnea, PND, edema. She has not had a recent cough or sputum production, fevers or malaise.  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    Also of note: She has had a mild anemia dating back to July 2016 for which she has been referred for a colonoscopy, but she has not scheduled that test yet. Of note, she does have a family history of colon cancer.    Problem list and histories reviewed & adjusted, as indicated.  Additional history: as documented    Patient Active Problem List   Diagnosis     Benign essential hypertension     History reviewed. No pertinent past surgical history.    Social History   Substance Use Topics     Smoking status: Never Smoker     Smokeless tobacco: Never Used     Alcohol use No     Family History   Problem Relation Age of Onset     Unknown/Adopted Mother      Unknown/Adopted Father          Current Outpatient Prescriptions   Medication Sig Dispense Refill     aspirin 81 MG chewable tablet Take 81 mg by mouth       lisinopril (PRINIVIL,ZESTRIL) 10 MG tablet Take 1 tablet (10 mg) by mouth daily 90 tablet 1     No Known Allergies    Reviewed and updated as needed this visit by clinical staff  Tobacco  Allergies  Meds  Med Hx  Surg Hx  Fam Hx  Soc Hx      Reviewed and updated as needed this visit by Provider         ROS:  Constitutional, HEENT, cardiovascular, pulmonary, gi and gu systems are negative, except as otherwise noted.    OBJECTIVE:                                                    /74 (BP Location: Right arm, Patient Position: Chair, Cuff Size: Adult Regular)  Pulse 75  Temp 98.1  F (36.7  C) (Oral)  Ht 5' 3\" (1.6 m)  Wt 163 lb 9.6 oz (74.2 kg)  SpO2 95%  Breastfeeding? No  BMI 28.98 kg/m2  Body mass index is 28.98 kg/(m^2).  Gen.: This is a well-appearing elderly female in no acute distress. She does not appear different from previous examinations. Cardiovascular: The heart has a regular rate and rhythm without appreciable murmur gallop or rub. There is no jugular venous " distention. Pulmonary: The lungs are clear to auscultation bilaterally, breathing is not labored, no dullness to percussion, no use of accessory muscles. Extremities: Well-perfused and without edema clubbing or cyanosis. Neuropsych: Alert and oriented to person place and time, cranial nerves II-12 appear grossly intact, normal gait, mildly anxious affect, normal mood, well-groomed, reasonable insight and judgment.    Diagnostic Test Results:  Results for orders placed or performed during the hospital encounter of 03/07/17   CT Chest Pulmonary Embolism w Contrast    Narrative    CT CHEST PULMONARY EMBOLISM WITH CONTRAST   3/7/2017 3:28 PM    HISTORY: Dyspnea. Elevated D-dimer. Shortness of breath.    TECHNIQUE: Pulmonary embolism protocol was performed. 59 mL Isovue-370  were injected IV. After contrast injection, volumetric helical  acquisition was performed from the thoracic inlet through the upper  abdomen. Radiation dose for this scan was reduced using automated  exposure control, adjustment of the mA and/or kV according to patient  size, or iterative reconstruction technique.    COMPARISON: Chest radiograph performed 3/4/2017.    FINDINGS:  No evidence for pulmonary embolism. The thoracic aorta is  of normal caliber, without evidence for thoracic aortic aneurysm or  dissection. No pleural or pericardial effusions. No enlarged lymph  nodes are identified in the chest. Indeterminate left thyroid nodule  contains a coarse calcification, and measures 2.6 cm. Small calcified  granuloma in the right middle lobe of the lung. The lungs are  otherwise clear. Small hiatal hernia. 1.2 cm low-density lesion in the  pancreatic tail (series 5 image 132) is most likely cystic. Mild  degenerative changes throughout the thoracic spine.      Impression    IMPRESSION:   1. No evidence for pulmonary embolism or thoracic aortic dissection.  2. Indeterminate left thyroid nodule measures 2.6 cm. Thyroid  ultrasound may be helpful for  further evaluation.  3. A small cystic appearing lesion in the pancreatic tail measures 1.2  cm. Differential considerations include epithelial cyst, IPMN, or  other cystic pancreatic neoplasm. Recommend follow-up pancreatic MRI  with MRCP in one year.   4. Small hiatal hernia.        DANN ACOSTA MD        ASSESSMENT/PLAN:                                                            1. PORTILLO (dyspnea on exertion)  Since her last provocative stress test was more than 3 years ago, I have referred her for another exercise echocardiogram stress test. To evaluate for possible obstructive lung disease or other pulmonary causes for her breathlessness, full PFTs were ordered and the patient will schedule them at [facility].  All the contact information for scheduling these tests were discussed with the patient and her daughter-in-law in clinic today.  - Exercise Stress Echocardiogram; Future  - General PFT Lab (Please always keep checked); Future  - Pulmonary Function Test; Future  - 6 minute walk test; Future    2. Iron deficiency anemia, unspecified iron deficiency anemia type  She really needs to schedule a colonoscopy, and she tells me she will do so. She would prefer to see Dr. Martinez Dunaway at [facility]. If he is not available she will see Dr. Kevin Cobb or Dr. Alfonso Ndiaye at [facility]. All those names and contact information were provided to the patient and her daughter-in-law today.  - GASTROENTEROLOGY ADULT REF PROCEDURE ONLY    FUTURE APPOINTMENTS:       - I asked Quiana to return in 4 weeks' time to discuss the results of the previously mentioned tests, and to further evaluate her persistent symptoms; again in one years time she will require an MRI to evaluate her pancreatic cyst.    César Chung MD  [facility]

## 2017-03-13 NOTE — MR AVS SNAPSHOT
After Visit Summary   3/13/2017    Quiana Dunaway    MRN: [id number]           Patient Information     Date Of Birth          1935        Visit Information        Provider Department      3/13/2017 2:00 PM César Chung MD [facility]        Today's Diagnoses     Iron deficiency anemia, unspecified iron deficiency anemia type    -  1    PORTILLO (dyspnea on exertion)           Follow-ups after your visit        Additional Services     GASTROENTEROLOGY ADULT REF PROCEDURE ONLY       Last Lab Result: Creatinine (mg/dL)       Date                     Value                 03/04/2017               0.67             ----------  Body mass index is 28.98 kg/(m^2).     Needed:  No  Language:  English    Patient will be contacted to schedule procedure.     Please be aware that coverage of these services is subject to the terms and limitations of your health insurance plan.  Call member services at your health plan with any benefit or coverage questions.  Any procedures must be performed at a [place] facility OR coordinated by your clinic's referral office.    Please bring the following with you to your appointment:    (1) Any X-Rays, CTs or MRIs which have been performed.  Contact the facility where they were done to arrange for  prior to your scheduled appointment.    (2) List of current medications   (3) This referral request   (4) Any documents/labs given to you for this referral                  Follow-up notes from your care team     Return in about 4 weeks (around 4/10/2017).      Your next 10 appointments already scheduled     Apr 20, 2017  1:30 PM CDT   [facility]    [address] [facility] [address] [phone]           Bring a current list of meds and any records pertaining to this visit.  For Physicals, please bring immunization records and any forms needing to be filled out.   "Please arrive 10 minutes early to complete paperwork.              Future tests that were ordered for you today     Open Future Orders        Priority Expected Expires Ordered    General PFT Lab (Please always keep checked) Routine  3/13/2018 3/13/2017    Pulmonary Function Test Routine  3/13/2018 3/13/2017    6 minute walk test Routine  3/13/2018 3/13/2017    Exercise Stress Echocardiogram Routine  3/13/2018 3/13/2017            Who to contact     If you have questions or need follow up information about today's clinic visit or your schedule please contact [facility] directly at [phone].  Normal or non-critical lab and imaging results will be communicated to you by [email or web address], letter or phone within 4 business days after the clinic has received the results. If you do not hear from us within 7 days, please contact the clinic through [email or web address] or phone. If you have a critical or abnormal lab result, we will notify you by phone as soon as possible.  Submit refill requests through [employer] or call your pharmacy and they will forward the refill request to us. Please allow 3 business days for your refill to be completed.          Additional Information About Your Visit        [email or web address] Information     [employer]Setter lets you send messages to your doctor, view your test results, renew your prescriptions, schedule appointments and more. To sign up, go to [email or web address] . Click on \"Log in\" on the left side of the screen, which will take you to the Welcome page. Then click on \"Sign up Now\" on the right side of the page.     You will be asked to enter the access code listed below, as well as some personal information. Please follow the directions to create your username and password.     Your access code is: [email or web address]  Expires: 6/3/2017 12:04 AM     Your access code will  in 90 days. If you need help or a new code, please call your [facility] or [phone].        Care EveryWhere ID     This is your " "Care EveryWhere ID. This could be used by other organizations to access your [place] medical records  [id number]        Your Vitals Were     Pulse Temperature Height Pulse Oximetry Breastfeeding? BMI (Body Mass Index)    75 98.1  F (36.7  C) (Oral) 5' 3\" (1.6 m) 95% No 28.98 kg/m2       Blood Pressure from Last 3 Encounters:   03/13/17 142/74   03/04/17 (!) 132/96   10/26/16 160/64    Weight from Last 3 Encounters:   03/13/17 163 lb 9.6 oz (74.2 kg)   03/06/17 150 lb (68 kg)   03/04/17 150 lb (68 kg)              We Performed the Following     GASTROENTEROLOGY ADULT REF PROCEDURE ONLY          Today's Medication Changes          These changes are accurate as of: 3/13/17  2:49 PM.  If you have any questions, ask your nurse or doctor.               Stop taking these medicines if you haven't already. Please contact your care team if you have questions.     ALPRAZolam 0.25 MG tablet   Commonly known as:  XANAX   Stopped by:  César Chung MD           escitalopram 5 MG tablet   Commonly known as:  LEXAPRO   Stopped by:  César Chung MD                    Primary Care Provider Office Phone # Fax #    César Chung [phone]       [facility] [address] [facility] [address]        Thank you!     Thank you for choosing [facility]  for your care. Our goal is always to provide you with excellent care. Hearing back from our patients is one way we can continue to improve our services. Please take a few minutes to complete the written survey that you may receive in the mail after your visit with us. Thank you!             Your Updated Medication List - Protect others around you: Learn how to safely use, store and throw away your medicines at www.disposemymeds.org.          This list is accurate as of: 3/13/17  2:49 PM.  Always use your most recent med list.                   Brand Name Dispense Instructions for use    aspirin 81 MG chewable tablet      Take 81 mg by mouth "       lisinopril 10 MG tablet    PRINIVIL/ZESTRIL    90 tablet    Take 1 tablet (10 mg) by mouth daily

## 2017-03-13 NOTE — NURSING NOTE
"Chief Complaint   Patient presents with     Shortness of Breath     Results       Initial /74 (BP Location: Right arm, Patient Position: Chair, Cuff Size: Adult Regular)  Pulse 75  Temp 98.1  F (36.7  C) (Oral)  Ht 5' 3\" (1.6 m)  Wt 163 lb 9.6 oz (74.2 kg)  SpO2 95%  Breastfeeding? No  BMI 28.98 kg/m2 Estimated body mass index is 28.98 kg/(m^2) as calculated from the following:    Height as of this encounter: 5' 3\" (1.6 m).    Weight as of this encounter: 163 lb 9.6 oz (74.2 kg).  Medication Reconciliation: complete     LISHA Zaidi MA March 13, 2017 2:09 PM      "

## 2017-03-20 ENCOUNTER — HOSPITAL ENCOUNTER (OUTPATIENT)
Dept: CARDIOLOGY | Facility: CLINIC | Age: 82
Discharge: HOME | End: 2017-03-20
Attending: INTERNAL MEDICINE | Admitting: INTERNAL MEDICINE
Payer: MEDICARE

## 2017-03-20 ENCOUNTER — TELEPHONE (OUTPATIENT)
Dept: FAMILY MEDICINE | Facility: CLINIC | Age: 82
End: 2017-03-20

## 2017-03-20 DIAGNOSIS — R06.09 DOE (DYSPNEA ON EXERTION): ICD-10-CM

## 2017-03-20 PROCEDURE — 93350 STRESS TTE ONLY: CPT | Mod: 26 | Performed by: INTERNAL MEDICINE

## 2017-03-20 PROCEDURE — 93321 DOPPLER ECHO F-UP/LMTD STD: CPT | Mod: 26 | Performed by: INTERNAL MEDICINE

## 2017-03-20 PROCEDURE — 93350 STRESS TTE ONLY: CPT | Mod: TC

## 2017-03-20 PROCEDURE — 93325 DOPPLER ECHO COLOR FLOW MAPG: CPT | Mod: 26 | Performed by: INTERNAL MEDICINE

## 2017-03-20 PROCEDURE — 93018 CV STRESS TEST I&R ONLY: CPT | Performed by: INTERNAL MEDICINE

## 2017-03-20 PROCEDURE — 93016 CV STRESS TEST SUPVJ ONLY: CPT | Performed by: INTERNAL MEDICINE

## 2017-03-20 NOTE — TELEPHONE ENCOUNTER
Reason for Call:  Other Patient Concern     Detailed comments: Quiana Dunaway called and got the results of her stress test. She has to have a pacemaker put in. She is wanting to speak with Dr. Chung about this and see how quick he feels it should go in. Also, patient is wondering if she still needs to have  The pulmonary appointment on Thursday.     Phone Number Patient can be reached at: Other phone number:  [phone]    Best Time: ASAP     Can we leave a detailed message on this number? YES    Call taken on 3/20/2017 at 2:56 PM by Yolanda Rg, Patient Representative

## 2017-03-20 NOTE — TELEPHONE ENCOUNTER
She can skip the pulmonary appointment     The sooner she gets the pacemaker in, the sooner she will feel better.  Usually, we don't waste too much time before we get those in.

## 2017-03-20 NOTE — TELEPHONE ENCOUNTER
Patient notified with information noted below from provider and agrees with plan.  Cynthia Clancy RN  [employer]riage [employer]

## 2017-03-21 DIAGNOSIS — I10 BENIGN ESSENTIAL HYPERTENSION: ICD-10-CM

## 2017-03-21 NOTE — TELEPHONE ENCOUNTER
Pending Prescriptions:                       Disp   Refills    lisinopril (PRINIVIL/ZESTRIL) 10 MG [id number] tab*1            Sig: Take 1 tablet (10 mg) by mouth daily          Last Written Prescription Date: 9/20/16  Last Fill Quantity: 90, # refills: 1  Last Office Visit with G, P or [facility] prescribing provider: 3/13/17  Next 5 appointments (look out 90 days)     Apr 24, 2017  1:00 PM CDT   [facility]    [address] [facility] [address]   [phone]                   Potassium   Date Value Ref Range Status   03/04/2017 3.8 3.4 - 5.3 mmol/L Final     Creatinine   Date Value Ref Range Status   03/04/2017 0.67 0.52 - 1.04 mg/dL Final     BP Readings from Last 3 Encounters:   03/13/17 142/74   03/04/17 (!) 132/96   10/26/16 160/64

## 2017-03-22 ENCOUNTER — OFFICE VISIT (OUTPATIENT)
Dept: CARDIOLOGY | Facility: CLINIC | Age: 82
End: 2017-03-22
Payer: MEDICARE

## 2017-03-22 VITALS
WEIGHT: 163.1 LBS | SYSTOLIC BLOOD PRESSURE: 144 MMHG | HEIGHT: 62 IN | HEART RATE: 60 BPM | BODY MASS INDEX: 30.01 KG/M2 | DIASTOLIC BLOOD PRESSURE: 72 MMHG

## 2017-03-22 DIAGNOSIS — I49.8 OTHER SPECIFIED CARDIAC ARRHYTHMIAS: ICD-10-CM

## 2017-03-22 DIAGNOSIS — I44.2 ATRIOVENTRICULAR BLOCK, COMPLETE (H): Primary | ICD-10-CM

## 2017-03-22 DIAGNOSIS — I44.2 ATRIOVENTRICULAR BLOCK, COMPLETE (H): ICD-10-CM

## 2017-03-22 LAB — TSH SERPL DL<=0.005 MIU/L-ACNC: 0.92 MU/L (ref 0.4–4)

## 2017-03-22 PROCEDURE — 84443 ASSAY THYROID STIM HORMONE: CPT | Performed by: INTERNAL MEDICINE

## 2017-03-22 PROCEDURE — 36415 COLL VENOUS BLD VENIPUNCTURE: CPT | Performed by: INTERNAL MEDICINE

## 2017-03-22 PROCEDURE — 99204 OFFICE O/P NEW MOD 45 MIN: CPT | Performed by: INTERNAL MEDICINE

## 2017-03-22 RX ORDER — LISINOPRIL 10 MG/1
10 TABLET ORAL DAILY
Qty: 90 TABLET | Refills: 1 | Status: SHIPPED | OUTPATIENT
Start: 2017-03-22 | End: 2017-09-18

## 2017-03-22 NOTE — MR AVS SNAPSHOT
After Visit Summary   3/22/2017    Quiana Dunaway    MRN: [id number]           Patient Information     Date Of Birth          1935        Visit Information        Provider Department      3/22/2017 11:00 AM Alberto Worthington MD [facility] [place]        Today's Diagnoses     Atrioventricular block, complete (H)    -  1    Other specified cardiac arrhythmias            Follow-ups after your visit        Your next 10 appointments already scheduled     Mar 30, 2017 10:30 AM CDT   Ep 90 Minute with SHCVR3   [facility] ([facility])    [address]  [place] [address] [phone]            Apr 24, 2017  1:00 PM CDT   Office Visit with César Chung MD   [facility]    [address] [address] [facility] [id number] [phone]           Bring a current list of meds and any records pertaining to this visit.  For Physicals, please bring immunization records and any forms needing to be filled out.  Please arrive 10 minutes early to complete paperwork.              Future tests that were ordered for you today     Open Future Orders        Priority Expected Expires Ordered    TSH with free T4 reflex Routine 3/22/2017 3/22/2019 3/22/2017    EP ICD/Pacemaker/Loop Recorder Routine 3/30/2017 3/22/2018 3/22/2017            Who to contact     If you have questions or need follow up information about today's clinic visit or your schedule please contact [facility] directly at [phone].  Normal or non-critical lab and imaging results will be communicated to you by MyChart, letter or phone within 4 business days after the clinic has received the results. If you do not hear from us within 7 days, please contact the clinic through MyChart or phone. If you have a critical or abnormal lab result, we will notify you by phone as soon as  "possible.  Submit refill requests through [email or web address] or call your pharmacy and they will forward the refill request to us. Please allow 3 business days for your refill to be completed.          Additional Information About Your Visit        [facility] [facility] [place] Information     [email or web address] lets you send messages to your doctor, view your test results, renew your prescriptions, schedule appointments and more. To sign up, go to [email or web address] . Click on \"Log in\" on the left side of the screen, which will take you to the Welcome page. Then click on \"Sign up Now\" on the right side of the page.     You will be asked to enter the access code listed below, as well as some personal information. Please follow the directions to create your username and password.     Your access code is: [email or web address]  Expires: 6/3/2017 12:04 AM     Your access code will  in 90 days. If you need help or a new code, please call your [place] clinic or [phone].        Care EveryWhere ID     This is your Care EveryWhere ID. This could be used by other organizations to access your [place] medical records  [id number]        Your Vitals Were     Pulse Height BMI (Body Mass Index)             60 1.575 m (5' 2\") 29.83 kg/m2          Blood Pressure from Last 3 Encounters:   17 144/72   17 142/74   17 (!) 132/96    Weight from Last 3 Encounters:   17 74 kg (163 lb 1.6 oz)   17 74.2 kg (163 lb 9.6 oz)   17 68 kg (150 lb)               Primary Care Provider Office Phone # Fax #    César Chung [phone]       [facility] [address] [place] [address]        Thank you!     Thank you for choosing [facility] [place]  for your care. Our goal is always to provide you with excellent care. Hearing back from our patients is one way we can continue to improve our services. Please take a few minutes to complete the written survey that you may receive in the " mail after your visit with us. Thank you!             Your Updated Medication List - Protect others around you: Learn how to safely use, store and throw away your medicines at www.disposemymeds.org.          This list is accurate as of: 3/22/17 11:38 AM.  Always use your most recent med list.                   Brand Name Dispense Instructions for use    aspirin 81 MG chewable tablet      Take 81 mg by mouth       lisinopril 10 MG tablet    PRINIVIL/ZESTRIL    90 tablet    Take 1 tablet (10 mg) by mouth daily

## 2017-03-22 NOTE — PROGRESS NOTES
"HPI and Plan:   See dictation  922500    Physical Exam:  Vitals: /72  Pulse 60  Ht 1.575 m (5' 2\")  Wt 74 kg (163 lb 1.6 oz)  BMI 29.83 kg/m2    Constitutional:  AAO x3.  Pt is in NAD.  HEAD: normocephalic.  SKIN: Skin normal color, texture and turgor with no lesions or eruptions.  Eyes: PERRL, EOMI.  ENT:  Supple, normal JVP. No lymphadenopathy or thyroid enlargement.  Chest:  CTAB.  Cardiac: RRR, normal  S1 and S2.  No murmurs rubs or gallop.   Abdomen:  Normal BS.  Soft, non-tender and non-distended.  No rebound or guarding.    Extremities:  Pedious pulses palpable B/L.  No LE edema noticed.   Neurological: Strength and sensation grossly symmetric and intact throughout.       CURRENT MEDICATIONS:  Current Outpatient Prescriptions   Medication Sig Dispense Refill     aspirin 81 MG chewable tablet Take 81 mg by mouth       lisinopril (PRINIVIL,ZESTRIL) 10 MG tablet Take 1 tablet (10 mg) by mouth daily 90 tablet 1       ALLERGIES   No Known Allergies    PAST MEDICAL HISTORY:  Past Medical History:   Diagnosis Date     HTN (hypertension)        PAST SURGICAL HISTORY:  No past surgical history on file.    FAMILY HISTORY:  Family History   Problem Relation Age of Onset     Unknown/Adopted Mother      Unknown/Adopted Father        SOCIAL HISTORY:  Social History     Social History     Marital status:      Spouse name: N/A     Number of children: N/A     Years of education: N/A     Social History Main Topics     Smoking status: Never Smoker     Smokeless tobacco: Never Used     Alcohol use No     Drug use: No     Sexual activity: No     Other Topics Concern     None     Social History Narrative       Review of Systems:  Skin:  Negative     Eyes:  Negative    ENT:  Negative    Respiratory:  Positive for shortness of breath;dyspnea on exertion  Cardiovascular:    Positive for;edema;palpitations  Gastroenterology: Negative    Genitourinary:  Negative    Musculoskeletal:  Negative    Neurologic:  Positive " for numbness or tingling of feet  Psychiatric:  Positive for sleep disturbances  Heme/Lymph/Imm:  Negative    Endocrine:  Negative        Recent Lab Results:  LIPID RESULTS:  Lab Results   Component Value Date    CHOL 176 02/09/2015    HDL 82 02/09/2015    LDL 75 02/09/2015    TRIG 93 02/09/2015    CHOLHDLRATIO 2.8 03/04/2009       LIVER ENZYME RESULTS:  Lab Results   Component Value Date    AST 25 03/09/2013    ALT 24 03/09/2013       CBC RESULTS:  Lab Results   Component Value Date    WBC 3.2 (L) 03/04/2017    RBC 3.26 (L) 03/04/2017    HGB 10.7 (L) 03/06/2017    HCT 30.7 (L) 03/04/2017    MCV 94 03/04/2017    MCH 31.9 03/04/2017    MCHC 33.9 03/04/2017    RDW 13.0 03/04/2017     03/04/2017       BMP RESULTS:  Lab Results   Component Value Date     03/04/2017    POTASSIUM 3.8 03/04/2017    CHLORIDE 108 03/04/2017    CO2 27 03/04/2017    ANIONGAP 6 03/04/2017    GLC 91 03/04/2017    BUN 26 03/04/2017    CR 0.67 03/04/2017    GFRESTIMATED 85 03/04/2017    GFRESTBLACK >90   GFR Calc   03/04/2017    HUMBERTO 8.1 (L) 03/04/2017        A1C RESULTS:  No results found for: A1C    INR RESULTS:  No results found for: INR      ECHOCARDIOGRAM  No results found for this or any previous visit (from the past 8760 hour(s)).      Orders Placed This Encounter   Procedures     TSH with free T4 reflex     EP ICD/Pacemaker/Loop Recorder     No orders of the defined types were placed in this encounter.    There are no discontinued medications.      Encounter Diagnoses   Name Primary?     Atrioventricular block, complete (H) Yes     Other specified cardiac arrhythmias           CC  No referring provider defined for this encounter.

## 2017-03-22 NOTE — TELEPHONE ENCOUNTER
Routing refill request to provider for review/approval because:  Labs out of range:  Elevated BP  Cynthia Clancy RN  Triage [employer]

## 2017-03-22 NOTE — LETTER
3/22/2017    César Chung MD  [facility]   [address] [place] [address]    RE: Quiana PEREZ Emelyn       Dear Colleague,    I had the pleasure of seeing Quiana PEREZ Emelyn in the [facility].    REASON FOR VISIT:  Evaluation of AV block.      Ms. Dunaway is a delightful 81-year-old lady with a history of hypertension who is here for evaluation of high-degree AV block.      The patient informs that in the last couple of months she has been experiencing dyspnea on exertion.  She informs that she cannot do any small activities and she becomes short of breath.  She was evaluated by a cardiologist in 10/2016.  At that time, there was a question whether or not she had pneumonia, and echocardiogram revealed normal cardiac function.  Later she was evaluated by Dr. Chung, and a chest CT ruled out a pulmonary embolism.  She was referred for a stress echo, which revealed high-degree AV block.      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  During physical activity, heart rates got worse as she developed high-degree AV block.  She developed symptoms of shortness of breath during the stress test.  The stress test was then aborted.      At the moment, she is doing well, and denies any other symptoms such as lightheadedness, near syncope or syncopal episode.  Baseline EKG shows Wenckebach and no significant conduction disease.  Echocardiogram obtained in 11/2016 was essentially negative.     Outpatient Encounter Prescriptions as of 3/22/2017   Medication Sig Dispense Refill     aspirin 81 MG chewable tablet Take 81 mg by mouth       [DISCONTINUED] lisinopril (PRINIVIL,ZESTRIL) 10 MG tablet Take 1 tablet (10 mg) by mouth daily 90 tablet 1     No facility-administered encounter medications on file as of 3/22/2017.       ASSESSMENT AND PLAN:  Symptomatic paroxysmal AV block (high-degree).  She is not on any AV lalo agents and does not have risk factors for  Lyme disease.  I recommend obtaining a TSH and FT4.  Assuming that thyroid function is normal, I recommend pacemaker implantation.      I explained the procedure in detail.  She understands there is 1%-2% risk of complication associated with the procedure.  At this time, she would like to have it done.  Based on her schedule, the best day for her is 03/30.  I will make arrangements to have it done that day.     Again, thank you for allowing me to participate in the care of your patient.      Sincerely,    Alberto Worthington MD     [facility]

## 2017-03-23 NOTE — PROGRESS NOTES
REASON FOR VISIT:  Evaluation of AV block.      HISTORY OF PRESENT ILLNESS:  Ms. Castillo is a delightful 81-year-old lady with a history of hypertension who is here for evaluation of high-degree AV block.      The patient informs that in the last couple of months she has been experiencing dyspnea on exertion.  She informs that she cannot do any small activities and she becomes short of breath.  She was evaluated by a cardiologist in 10/2016.  At that time, there was a question whether or not she had pneumonia, and echocardiogram revealed normal cardiac function.  Later she was evaluated by Dr. Chung, and a chest CT ruled out a pulmonary embolism.  She was referred for a stress echo, which revealed high-degree AV block.      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  During physical activity, heart rates got worse as she developed high-degree AV block.  She developed symptoms of shortness of breath during the stress test.  The stress test was then aborted.      At the moment, she is doing well, and denies any other symptoms such as lightheadedness, near syncope or syncopal episode.  Baseline EKG shows Wenckebach and no significant conduction disease.  Echocardiogram obtained in 11/2016 was essentially negative.      ASSESSMENT AND PLAN:  Symptomatic paroxysmal AV block (high-degree).  She is not on any AV lalo agents and does not have risk factors for Lyme disease.  I recommend obtaining a TSH and FT4.  Assuming that thyroid function is normal, I recommend pacemaker implantation.      I explained the procedure in detail.  She understands there is 1%-2% risk of complication associated with the procedure.  At this time, she would like to have it done.  Based on her schedule, the best day for her is 03/30.  I will make arrangements to have it done that day.         SAM HOLM MD             D: 03/22/2017 11:35   T: 03/23/2017 03:54   MT: MELL      Name:     DUGLAS CASTILLO   MRN:       [id number]        Account:      [id number]   :      1935           Service Date: 2017      Document: [id number]

## 2017-03-24 DIAGNOSIS — I44.2 ATRIOVENTRICULAR BLOCK, COMPLETE (H): Primary | ICD-10-CM

## 2017-03-24 RX ORDER — SODIUM CHLORIDE 450 MG/100ML
INJECTION, SOLUTION INTRAVENOUS CONTINUOUS
Status: CANCELLED | OUTPATIENT
Start: 2017-03-24

## 2017-03-24 RX ORDER — CEFAZOLIN SODIUM 2 G/100ML
2 INJECTION, SOLUTION INTRAVENOUS
Status: CANCELLED | OUTPATIENT
Start: 2017-03-24

## 2017-03-24 RX ORDER — LIDOCAINE 40 MG/G
CREAM TOPICAL
Status: CANCELLED | OUTPATIENT
Start: 2017-03-24

## 2017-03-27 ENCOUNTER — HOSPITAL ENCOUNTER (OUTPATIENT)
Facility: CLINIC | Age: 82
Discharge: HOME | End: 2017-03-27
Attending: INTERNAL MEDICINE | Admitting: INTERNAL MEDICINE
Payer: MEDICARE

## 2017-03-27 ENCOUNTER — APPOINTMENT (OUTPATIENT)
Dept: CARDIOLOGY | Facility: CLINIC | Age: 82
End: 2017-03-27
Attending: INTERNAL MEDICINE
Payer: MEDICARE

## 2017-03-27 ENCOUNTER — APPOINTMENT (OUTPATIENT)
Dept: GENERAL RADIOLOGY | Facility: CLINIC | Age: 82
End: 2017-03-27
Attending: INTERNAL MEDICINE
Payer: MEDICARE

## 2017-03-27 VITALS
HEIGHT: 63 IN | RESPIRATION RATE: 16 BRPM | WEIGHT: 163 LBS | BODY MASS INDEX: 28.88 KG/M2 | OXYGEN SATURATION: 94 % | TEMPERATURE: 97 F | HEART RATE: 53 BPM | SYSTOLIC BLOOD PRESSURE: 156 MMHG | DIASTOLIC BLOOD PRESSURE: 79 MMHG

## 2017-03-27 DIAGNOSIS — I44.2 ATRIOVENTRICULAR BLOCK, COMPLETE (H): ICD-10-CM

## 2017-03-27 LAB
ANION GAP SERPL CALCULATED.3IONS-SCNC: 5 MMOL/L (ref 3–14)
BUN SERPL-MCNC: 26 MG/DL (ref 7–30)
CALCIUM SERPL-MCNC: 8.6 MG/DL (ref 8.5–10.1)
CHLORIDE SERPL-SCNC: 108 MMOL/L (ref 94–109)
CO2 SERPL-SCNC: 30 MMOL/L (ref 20–32)
CREAT SERPL-MCNC: 0.71 MG/DL (ref 0.52–1.04)
ERYTHROCYTE [DISTWIDTH] IN BLOOD BY AUTOMATED COUNT: 13.6 % (ref 10–15)
GFR SERPL CREATININE-BSD FRML MDRD: 79 ML/MIN/1.7M2
GLUCOSE SERPL-MCNC: 94 MG/DL (ref 70–99)
HCT VFR BLD AUTO: 31.5 % (ref 35–47)
HGB BLD-MCNC: 10.6 G/DL (ref 11.7–15.7)
MCH RBC QN AUTO: 32.1 PG (ref 26.5–33)
MCHC RBC AUTO-ENTMCNC: 33.7 G/DL (ref 31.5–36.5)
MCV RBC AUTO: 96 FL (ref 78–100)
PLATELET # BLD AUTO: 153 10E9/L (ref 150–450)
POTASSIUM SERPL-SCNC: 4 MMOL/L (ref 3.4–5.3)
RBC # BLD AUTO: 3.3 10E12/L (ref 3.8–5.2)
SODIUM SERPL-SCNC: 143 MMOL/L (ref 133–144)
WBC # BLD AUTO: 2.9 10E9/L (ref 4–11)

## 2017-03-27 PROCEDURE — 40000235 ZZH STATISTIC TELEMETRY

## 2017-03-27 PROCEDURE — C1785 PMKR, DUAL, RATE-RESP: HCPCS

## 2017-03-27 PROCEDURE — S0020 INJECTION, BUPIVICAINE HYDRO: HCPCS | Performed by: INTERNAL MEDICINE

## 2017-03-27 PROCEDURE — 0JH606Z INSERTION OF PACEMAKER, DUAL CHAMBER INTO CHEST SUBCUTANEOUS TISSUE AND FASCIA, OPEN APPROACH: ICD-10-PCS | Performed by: INTERNAL MEDICINE

## 2017-03-27 PROCEDURE — 36415 COLL VENOUS BLD VENIPUNCTURE: CPT | Performed by: INTERNAL MEDICINE

## 2017-03-27 PROCEDURE — 40000852 ZZH STATISTIC HEART CATH LAB OR EP LAB

## 2017-03-27 PROCEDURE — C1898 LEAD, PMKR, OTHER THAN TRANS: HCPCS

## 2017-03-27 PROCEDURE — 25000128 H RX IP 250 OP 636: Performed by: INTERNAL MEDICINE

## 2017-03-27 PROCEDURE — 99152 MOD SED SAME PHYS/QHP 5/>YRS: CPT | Performed by: INTERNAL MEDICINE

## 2017-03-27 PROCEDURE — C1892 INTRO/SHEATH,FIXED,PEEL-AWAY: HCPCS

## 2017-03-27 PROCEDURE — 27210795 ZZH PAD DEFIB QUICK CR4

## 2017-03-27 PROCEDURE — 40000065 ZZH STATISTIC EKG NON-CHARGEABLE

## 2017-03-27 PROCEDURE — 93010 ELECTROCARDIOGRAM REPORT: CPT | Performed by: INTERNAL MEDICINE

## 2017-03-27 PROCEDURE — 85027 COMPLETE CBC AUTOMATED: CPT | Performed by: INTERNAL MEDICINE

## 2017-03-27 PROCEDURE — 02HK3JZ INSERTION OF PACEMAKER LEAD INTO RIGHT VENTRICLE, PERCUTANEOUS APPROACH: ICD-10-PCS | Performed by: INTERNAL MEDICINE

## 2017-03-27 PROCEDURE — 93005 ELECTROCARDIOGRAM TRACING: CPT

## 2017-03-27 PROCEDURE — 99152 MOD SED SAME PHYS/QHP 5/>YRS: CPT

## 2017-03-27 PROCEDURE — 99153 MOD SED SAME PHYS/QHP EA: CPT | Performed by: INTERNAL MEDICINE

## 2017-03-27 PROCEDURE — 25000125 ZZHC RX 250: Performed by: INTERNAL MEDICINE

## 2017-03-27 PROCEDURE — 02H63JZ INSERTION OF PACEMAKER LEAD INTO RIGHT ATRIUM, PERCUTANEOUS APPROACH: ICD-10-PCS | Performed by: INTERNAL MEDICINE

## 2017-03-27 PROCEDURE — 99153 MOD SED SAME PHYS/QHP EA: CPT

## 2017-03-27 PROCEDURE — 33208 INSRT HEART PM ATRIAL & VENT: CPT | Mod: KX | Performed by: INTERNAL MEDICINE

## 2017-03-27 PROCEDURE — 27210784 ZZH KIT PACEMAKER CR8

## 2017-03-27 PROCEDURE — 80048 BASIC METABOLIC PNL TOTAL CA: CPT | Performed by: INTERNAL MEDICINE

## 2017-03-27 PROCEDURE — 40000940 XR CHEST 2 VW

## 2017-03-27 PROCEDURE — 33208 INSRT HEART PM ATRIAL & VENT: CPT | Mod: KX

## 2017-03-27 PROCEDURE — 27210995 ZZH RX 272: Performed by: INTERNAL MEDICINE

## 2017-03-27 RX ORDER — DIPHENHYDRAMINE HYDROCHLORIDE 50 MG/ML
25-50 INJECTION INTRAMUSCULAR; INTRAVENOUS
Status: DISCONTINUED | OUTPATIENT
Start: 2017-03-27 | End: 2017-03-27 | Stop reason: HOSPADM

## 2017-03-27 RX ORDER — SODIUM CHLORIDE 450 MG/100ML
INJECTION, SOLUTION INTRAVENOUS CONTINUOUS
Status: DISCONTINUED | OUTPATIENT
Start: 2017-03-27 | End: 2017-03-27 | Stop reason: HOSPADM

## 2017-03-27 RX ORDER — BUPIVACAINE HYDROCHLORIDE 2.5 MG/ML
10-30 INJECTION, SOLUTION EPIDURAL; INFILTRATION; INTRACAUDAL
Status: DISCONTINUED | OUTPATIENT
Start: 2017-03-27 | End: 2017-03-27 | Stop reason: HOSPADM

## 2017-03-27 RX ORDER — LIDOCAINE 40 MG/G
CREAM TOPICAL
Status: DISCONTINUED | OUTPATIENT
Start: 2017-03-27 | End: 2017-03-27 | Stop reason: HOSPADM

## 2017-03-27 RX ORDER — LIDOCAINE HYDROCHLORIDE 10 MG/ML
10-30 INJECTION, SOLUTION EPIDURAL; INFILTRATION; INTRACAUDAL; PERINEURAL
Status: DISCONTINUED | OUTPATIENT
Start: 2017-03-27 | End: 2017-03-27 | Stop reason: HOSPADM

## 2017-03-27 RX ORDER — BUPIVACAINE HYDROCHLORIDE 2.5 MG/ML
10-30 INJECTION, SOLUTION EPIDURAL; INFILTRATION; INTRACAUDAL
Status: COMPLETED | OUTPATIENT
Start: 2017-03-27 | End: 2017-03-27

## 2017-03-27 RX ORDER — ONDANSETRON 2 MG/ML
4 INJECTION INTRAMUSCULAR; INTRAVENOUS EVERY 4 HOURS PRN
Status: DISCONTINUED | OUTPATIENT
Start: 2017-03-27 | End: 2017-03-27 | Stop reason: HOSPADM

## 2017-03-27 RX ORDER — NALOXONE HYDROCHLORIDE 0.4 MG/ML
0.4 INJECTION, SOLUTION INTRAMUSCULAR; INTRAVENOUS; SUBCUTANEOUS EVERY 5 MIN PRN
Status: DISCONTINUED | OUTPATIENT
Start: 2017-03-27 | End: 2017-03-27 | Stop reason: HOSPADM

## 2017-03-27 RX ORDER — IBUTILIDE FUMARATE 1 MG/10ML
1 INJECTION, SOLUTION INTRAVENOUS
Status: DISCONTINUED | OUTPATIENT
Start: 2017-03-27 | End: 2017-03-27 | Stop reason: HOSPADM

## 2017-03-27 RX ORDER — FLUMAZENIL 0.1 MG/ML
0.2 INJECTION, SOLUTION INTRAVENOUS
Status: DISCONTINUED | OUTPATIENT
Start: 2017-03-27 | End: 2017-03-27 | Stop reason: HOSPADM

## 2017-03-27 RX ORDER — LIDOCAINE HYDROCHLORIDE AND EPINEPHRINE 10; 10 MG/ML; UG/ML
10-30 INJECTION, SOLUTION INFILTRATION; PERINEURAL
Status: DISCONTINUED | OUTPATIENT
Start: 2017-03-27 | End: 2017-03-27 | Stop reason: HOSPADM

## 2017-03-27 RX ORDER — DOBUTAMINE HYDROCHLORIDE 200 MG/100ML
5-40 INJECTION INTRAVENOUS CONTINUOUS PRN
Status: DISCONTINUED | OUTPATIENT
Start: 2017-03-27 | End: 2017-03-27 | Stop reason: HOSPADM

## 2017-03-27 RX ORDER — LORAZEPAM 2 MG/ML
.5-2 INJECTION INTRAMUSCULAR EVERY 10 MIN PRN
Status: DISCONTINUED | OUTPATIENT
Start: 2017-03-27 | End: 2017-03-27 | Stop reason: HOSPADM

## 2017-03-27 RX ORDER — NALOXONE HYDROCHLORIDE 0.4 MG/ML
.1-.4 INJECTION, SOLUTION INTRAMUSCULAR; INTRAVENOUS; SUBCUTANEOUS
Status: DISCONTINUED | OUTPATIENT
Start: 2017-03-27 | End: 2017-03-27 | Stop reason: HOSPADM

## 2017-03-27 RX ORDER — FUROSEMIDE 10 MG/ML
20-100 INJECTION INTRAMUSCULAR; INTRAVENOUS
Status: DISCONTINUED | OUTPATIENT
Start: 2017-03-27 | End: 2017-03-27 | Stop reason: HOSPADM

## 2017-03-27 RX ORDER — CEFAZOLIN SODIUM 2 G/100ML
2 INJECTION, SOLUTION INTRAVENOUS
Status: COMPLETED | OUTPATIENT
Start: 2017-03-27 | End: 2017-03-27

## 2017-03-27 RX ORDER — PROTAMINE SULFATE 10 MG/ML
1-5 INJECTION, SOLUTION INTRAVENOUS
Status: DISCONTINUED | OUTPATIENT
Start: 2017-03-27 | End: 2017-03-27 | Stop reason: HOSPADM

## 2017-03-27 RX ORDER — IBUTILIDE FUMARATE 1 MG/10ML
0.01 INJECTION, SOLUTION INTRAVENOUS
Status: DISCONTINUED | OUTPATIENT
Start: 2017-03-27 | End: 2017-03-27 | Stop reason: HOSPADM

## 2017-03-27 RX ORDER — LIDOCAINE HYDROCHLORIDE 10 MG/ML
10-30 INJECTION, SOLUTION EPIDURAL; INFILTRATION; INTRACAUDAL; PERINEURAL
Status: COMPLETED | OUTPATIENT
Start: 2017-03-27 | End: 2017-03-27

## 2017-03-27 RX ORDER — FENTANYL CITRATE 50 UG/ML
25-50 INJECTION, SOLUTION INTRAMUSCULAR; INTRAVENOUS
Status: DISCONTINUED | OUTPATIENT
Start: 2017-03-27 | End: 2017-03-27 | Stop reason: HOSPADM

## 2017-03-27 RX ORDER — PROMETHAZINE HYDROCHLORIDE 25 MG/ML
6.25-25 INJECTION, SOLUTION INTRAMUSCULAR; INTRAVENOUS EVERY 4 HOURS PRN
Status: DISCONTINUED | OUTPATIENT
Start: 2017-03-27 | End: 2017-03-27 | Stop reason: HOSPADM

## 2017-03-27 RX ORDER — KETOROLAC TROMETHAMINE 30 MG/ML
15 INJECTION, SOLUTION INTRAMUSCULAR; INTRAVENOUS
Status: DISCONTINUED | OUTPATIENT
Start: 2017-03-27 | End: 2017-03-27 | Stop reason: HOSPADM

## 2017-03-27 RX ORDER — HEPARIN SODIUM 1000 [USP'U]/ML
1000-10000 INJECTION, SOLUTION INTRAVENOUS; SUBCUTANEOUS EVERY 5 MIN PRN
Status: DISCONTINUED | OUTPATIENT
Start: 2017-03-27 | End: 2017-03-27 | Stop reason: HOSPADM

## 2017-03-27 RX ORDER — MORPHINE SULFATE 2 MG/ML
1-2 INJECTION, SOLUTION INTRAMUSCULAR; INTRAVENOUS EVERY 5 MIN PRN
Status: DISCONTINUED | OUTPATIENT
Start: 2017-03-27 | End: 2017-03-27 | Stop reason: HOSPADM

## 2017-03-27 RX ORDER — PROTAMINE SULFATE 10 MG/ML
5-40 INJECTION, SOLUTION INTRAVENOUS EVERY 10 MIN PRN
Status: DISCONTINUED | OUTPATIENT
Start: 2017-03-27 | End: 2017-03-27 | Stop reason: HOSPADM

## 2017-03-27 RX ORDER — ADENOSINE 3 MG/ML
6-12 INJECTION, SOLUTION INTRAVENOUS EVERY 5 MIN PRN
Status: DISCONTINUED | OUTPATIENT
Start: 2017-03-27 | End: 2017-03-27 | Stop reason: HOSPADM

## 2017-03-27 RX ADMIN — FENTANYL CITRATE 25 MCG: 50 INJECTION, SOLUTION INTRAMUSCULAR; INTRAVENOUS at 09:24

## 2017-03-27 RX ADMIN — BACITRACIN 25000 UNITS: 5000 INJECTION, POWDER, FOR SOLUTION INTRAMUSCULAR at 09:01

## 2017-03-27 RX ADMIN — LIDOCAINE HYDROCHLORIDE 100 MG: 10 INJECTION, SOLUTION EPIDURAL; INFILTRATION; INTRACAUDAL; PERINEURAL at 09:01

## 2017-03-27 RX ADMIN — SODIUM CHLORIDE: 4.5 INJECTION, SOLUTION INTRAVENOUS at 07:06

## 2017-03-27 RX ADMIN — BUPIVACAINE HYDROCHLORIDE 25 MG: 2.5 INJECTION, SOLUTION EPIDURAL; INFILTRATION; INTRACAUDAL at 09:01

## 2017-03-27 RX ADMIN — FENTANYL CITRATE 25 MCG: 50 INJECTION, SOLUTION INTRAMUSCULAR; INTRAVENOUS at 09:09

## 2017-03-27 RX ADMIN — MIDAZOLAM HYDROCHLORIDE 1 MG: 1 INJECTION, SOLUTION INTRAMUSCULAR; INTRAVENOUS at 08:50

## 2017-03-27 RX ADMIN — MIDAZOLAM HYDROCHLORIDE 1 MG: 1 INJECTION, SOLUTION INTRAMUSCULAR; INTRAVENOUS at 09:02

## 2017-03-27 RX ADMIN — FENTANYL CITRATE 50 MCG: 50 INJECTION, SOLUTION INTRAMUSCULAR; INTRAVENOUS at 08:50

## 2017-03-27 RX ADMIN — CEFAZOLIN SODIUM 2 G: 2 INJECTION, SOLUTION INTRAVENOUS at 08:40

## 2017-03-27 NOTE — PROGRESS NOTES
Care Suites Discharge Summary    Post pacer    Discharge Criteria:   Discharge Criteria met per MD orders: Yes.   Vital signs stable.     Pt demonstrates ability to ambulate safely: Yes.  (See discharge questionnaire for additional information)    Discharge instructions & education:   Discharge instructions reviewed with patient and and DTR in law. Patient verbalizes understanding.   Patient education provided:  Post pacer instructions  Medications:   Patient will be discharging on new medications- No. Patient verbalizes reason for use, start date, and side effects NA.    Items returned to patient:   Home and hospital acquired medications returned to patient NA   Listed belongings gathered and returned to patient: Yes    Patient discharged to home with spouse.     Ja Dozier

## 2017-03-27 NOTE — PROCEDURES
A dual chamber pacemaker was implanted uneventfully. Home in a few hours provided that CXR and device interrogation show no pneumothorax and normal device function, respectively.

## 2017-03-27 NOTE — IP AVS SNAPSHOT
[facility] [address]    [place] [address]    Phone:  [phone]                                       After Visit Summary   3/27/2017    Quiana Dunaway    MRN: [id number]           After Visit Summary Signature Page     I have received my discharge instructions, and my questions have been answered. I have discussed any challenges I see with this plan with the nurse or doctor.    ..........................................................................................................................................  Patient/Patient Representative Signature      ..........................................................................................................................................  Patient Representative Print Name and Relationship to Patient    ..................................................               ................................................  Date                                            Time    ..........................................................................................................................................  Reviewed by Signature/Title    ...................................................              ..............................................  Date                                                            Time

## 2017-03-27 NOTE — IP AVS SNAPSHOT
MRN:[id number]                      After Visit Summary   3/27/2017    Quiana Dunaway    MRN: [id number]           Visit Information        Department      3/27/2017  6:23 AM [facility]          Review of your medicines      CONTINUE these medicines which have NOT CHANGED        Dose / Directions    aspirin 81 MG chewable tablet        Dose:  81 mg   Take 81 mg by mouth   Refills:  0       lisinopril 10 MG tablet   Commonly known as:  PRINIVIL/ZESTRIL   Used for:  Benign essential hypertension        Dose:  10 mg   Take 1 tablet (10 mg) by mouth daily   Quantity:  90 tablet   Refills:  1                Protect others around you: Learn how to safely use, store and throw away your medicines at www.disposemymeds.org.         Follow-ups after your visit        Your next 10 appointments already scheduled     Apr 24, 2017  1:00 PM CDT   Office Visit with César Chung MD   [facility]    [address] [address]   [phone]           Bring a current list of meds and any records pertaining to this visit.  For Physicals, please bring immunization records and any forms needing to be filled out.  Please arrive 10 minutes early to complete paperwork.               Care Instructions        After Care Instructions     Discharge Instructions - Do not raise elbow of the implant arm over shoulder level for 2 weeks       Do not raise elbow of the implant arm over shoulder level for 2 weeks.            Discharge Instructions - Follow up with Device Check RN        Follow up with Device Check RN in 7-10 days.            Discharge Instructions - Keep incision dry for 72 hours       Keep incision dry for 72 hours (unless Derma Harris was applied)            Discharge Instructions - No soaking incision for 2 weeks       No soaking incision (swimming pool, bathtub, hot tub) for 2 weeks.                  Further instructions from your care team        Pacemaker/ICD Generator Change Discharge Instructions    After you go home:      Have an adult stay with you for 6 hours if you received sedation    You may resume your normal diet.       For 24 hours - due to the sedation you received:    Relax and take it easy.    Do NOT make any important or legal decisions.    Do NOT drive or operate machines at home or at work.    Do NOT drink alcohol.    Care of Chest Incision:      Keep the bandage on at least 3 days. You may remove the dressing on March 30th. Change it only if it gets loose or soaked. If you need to change it, use 4x4-inch gauze and a large clear bandage.     If there is a pressure dressing (gauze & tape) - 24 hours after your procedure you may remove ONLY the top dressing. Leave the bottom dressing on.    Leave the strips of tape on. They will fall off on their own, or we will remove them at your first check-up.    Check your wound daily for signs of infection, such as increased redness, severe swelling or draining. Fever may also be a sign of infection. Call us if you see any of these signs.    If there are no signs of infection, you may shower after the bandage comes off in 3 days. If you take a tub bath, keep the wound dry.    No soaking the incision (swimming pool, bathtub, hot tub) for 2 weeks.    You may have mild to medium pain for 3 to 5 days. Take Acetaminophen (Tylenol) or Ibuprofen (Advil) for the pain. If the pain persists or is severe, call us.    Activity:      You can begin to use your arm as it feels comfortable to you.    No driving for one day & limit to necessary driving for one week.    Bleeding:      If you start bleeding from the incision site, sit down and press firmly on the site for 10 minutes.     Once bleeding stops, call [facility] as soon as you can.       Call 911 right away if you have heavy bleeding or bleeding that does not stop.      Medicines:      Take your medications, including blood thinners, unless your  "provider tells you not to.    If you have stopped any other medicines, check with your provider about when to restart them.    Follow Up Appointments:      Follow up with Device Clinic at [facility] of patient preference in 7-10 days.    Call the clinic if:      You have a large or growing hard lump around the site.    The site is red, swollen, hot or tender.    Blood or fluid is draining from the site.    You have chills or a fever greater than 101 F (38 C).    You feel dizzy or light-headed.    Questions or concerns.      Telling others about your device:      Before you leave the hospital, you will receive a temporary ID card. A permanent card will be mailed to you about 6 to 8 weeks later. Always carry the ID card with you. It has important details about your device.    You may also get a Medical Alert bracelet or tag that says you have a pacemaker or a defibrillator (ICD).  Go to [email or web address].     Always tell doctors, dentists and other care providers that you have a device implanted in you.    Let us know before you plan any surgeries. Your care team must take special steps to keep you safe during certain procedures. These steps will depend on the type of device you have. Your provider will need to see your ID card. They may need to call us for instructions.    Device Safety:      Please refer to device  s booklet for further information.        [facility] [place]:    [phone] [facility] (7 days a week)     Additional Information About Your Visit        MyChart Information     [facility] [employer] lets you send messages to your doctor, view your test results, renew your prescriptions, schedule appointments and more. To sign up, go to [email or web address] . Click on \"Log in\" on the left side of the screen, which will take you to the Welcome page. Then click on \"Sign up Now\" on the right side of the page.     You will be asked to enter the access code listed below, " "as well as some personal information. Please follow the directions to create your username and password.     Your access code is: [email or web address]  Expires: 6/3/2017 12:04 AM     Your access code will  in 90 days. If you need help or a new code, please call your [facility] clinic or [phone].        Care EveryWhere ID     This is your Care EveryWhere ID. This could be used by other organizations to access your [facility] medical records  [id number]        Your Vitals Were     Blood Pressure Pulse Temperature Respirations Height Weight    125/72 53 97  F (36.1  C) (Oral) 16 1.6 m (5' 3\") 73.9 kg (163 lb)    Pulse Oximetry BMI (Body Mass Index)                94% 28.87 kg/m2           Primary Care Provider Office Phone # Fax #    César Chung [phone]      Thank you!     Thank you for choosing [facility] for your care. Our goal is always to provide you with excellent care. Hearing back from our patients is one way we can continue to improve our services. Please take a few minutes to complete the written survey that you may receive in the mail after you visit with us. Thank you!             Medication List: This is a list of all your medications and when to take them. Check marks below indicate your daily home schedule. Keep this list as a reference.      Medications           Morning Afternoon Evening Bedtime As Needed    aspirin 81 MG chewable tablet   Take 81 mg by mouth                                lisinopril 10 MG tablet   Commonly known as:  PRINIVIL/ZESTRIL   Take 1 tablet (10 mg) by mouth daily                                  "

## 2017-03-27 NOTE — DISCHARGE INSTRUCTIONS
Pacemaker/ICD Generator Change Discharge Instructions    After you go home:      Have an adult stay with you for 6 hours if you received sedation    You may resume your normal diet.       For 24 hours - due to the sedation you received:    Relax and take it easy.    Do NOT make any important or legal decisions.    Do NOT drive or operate machines at home or at work.    Do NOT drink alcohol.    Care of Chest Incision:      Keep the bandage on at least 3 days. You may remove the dressing on March 30th. Change it only if it gets loose or soaked. If you need to change it, use 4x4-inch gauze and a large clear bandage.     If there is a pressure dressing (gauze & tape) - 24 hours after your procedure you may remove ONLY the top dressing. Leave the bottom dressing on.    Leave the strips of tape on. They will fall off on their own, or we will remove them at your first check-up.    Check your wound daily for signs of infection, such as increased redness, severe swelling or draining. Fever may also be a sign of infection. Call us if you see any of these signs.    If there are no signs of infection, you may shower after the bandage comes off in 3 days. If you take a tub bath, keep the wound dry.    No soaking the incision (swimming pool, bathtub, hot tub) for 2 weeks.    You may have mild to medium pain for 3 to 5 days. Take Acetaminophen (Tylenol) or Ibuprofen (Advil) for the pain. If the pain persists or is severe, call us.    Activity:      You can begin to use your arm as it feels comfortable to you.    No driving for one day & limit to necessary driving for one week.    Bleeding:      If you start bleeding from the incision site, sit down and press firmly on the site for 10 minutes.     Once bleeding stops, call [facility] as soon as you can.       Call 911 right away if you have heavy bleeding or bleeding that does not stop.      Medicines:      Take your medications, including blood thinners, unless your provider  tells you not to.    If you have stopped any other medicines, check with your provider about when to restart them.    Follow Up Appointments:      Follow up with Device Clinic at [facility] of patient preference in 7-10 days.    Call the clinic if:      You have a large or growing hard lump around the site.    The site is red, swollen, hot or tender.    Blood or fluid is draining from the site.    You have chills or a fever greater than 101 F (38 C).    You feel dizzy or light-headed.    Questions or concerns.      Telling others about your device:      Before you leave the hospital, you will receive a temporary ID card. A permanent card will be mailed to you about 6 to 8 weeks later. Always carry the ID card with you. It has important details about your device.    You may also get a Medical Alert bracelet or tag that says you have a pacemaker or a defibrillator (ICD).  Go to [email or web address].     Always tell doctors, dentists and other care providers that you have a device implanted in you.    Let us know before you plan any surgeries. Your care team must take special steps to keep you safe during certain procedures. These steps will depend on the type of device you have. Your provider will need to see your ID card. They may need to call us for instructions.    Device Safety:      Please refer to device  s booklet for further information.        [facility] [place]:    [phone] [facility] (7 days a week)

## 2017-03-28 LAB — INTERPRETATION ECG - MUSE: NORMAL

## 2017-04-03 ENCOUNTER — TELEPHONE (OUTPATIENT)
Dept: CARDIOLOGY | Facility: CLINIC | Age: 82
End: 2017-04-03

## 2017-04-03 NOTE — TELEPHONE ENCOUNTER
Daughter in law calling to report pt is not feeling well and wonders if she should f/u with PCP or have PPM checked this Thurs as scheduled. Recalled and left message to discuss.

## 2017-04-03 NOTE — TELEPHONE ENCOUNTER
Daughter in law, Yudi calling and asking if pt should still feel tired and SOB after PPM implant. 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 Informed her that it will take a long time for her to return to normal activities since she is deconditioned. Offered earlier appt to check PPM for proper function and she if any adjustments are needed. R/s pace ed to tomorrow.

## 2017-04-04 ENCOUNTER — ALLIED HEALTH/NURSE VISIT (OUTPATIENT)
Dept: CARDIOLOGY | Facility: CLINIC | Age: 82
End: 2017-04-04
Payer: MEDICARE

## 2017-04-04 DIAGNOSIS — Z95.0 CARDIAC PACEMAKER IN SITU: Primary | ICD-10-CM

## 2017-04-04 PROCEDURE — 93280 PM DEVICE PROGR EVAL DUAL: CPT | Performed by: INTERNAL MEDICINE

## 2017-04-04 NOTE — PROGRESS NOTES
[employer] Ad[employer]a 7-10 Day Post PPM Implant Check  AP:  65 % : 17 %  Mode: DDDR 60        Presenting Rhythm: NSR with intermittent SSS  Heart Rate: Stable with adequate variability, although pt continues to c/o SOB and states has not improved since PPM implanted. Rate response activity threshold adjusted from Medium Low to Low  Sensing: Stable    Pacing Threshold: Stable    Impedance: Stable  Battery Status: 3.08 V, longevity is initializing  Incision: Steri strips removed from left anterior chest incision. Mild swelling and bruising around PPM. Edges of incision are well approximated. 2 steri strips reapplied to lateral edge of incision to promote further healing. No signs of infection.  Atrial Arrhythmia: None  Ventricular Arrhythmia: None     Care Plan: Daughter accompanied pt to [place] today and all questions answered. Pt reminded to not raise left arm higher than shoulder height x 2 more weeks. 6 week device recheck scheduled for Stacie Weber RN.

## 2017-04-04 NOTE — MR AVS SNAPSHOT
After Visit Summary   4/4/2017    Quiana Dunaway    MRN: [id number]           Patient Information     Date Of Birth          1935        Visit Information        Provider Department      4/4/2017 3:00 PM RIOS DCR2 [facility] HEART [facility]        Today's Diagnoses     Cardiac pacemaker in situ    -  1       Follow-ups after your visit        Your next 10 appointments already scheduled     May 01, 2017  1:30 PM CDT   Office Visit with César Chung MD   [facility] ([facility]    [address] [facility] [address] [address] [phone]           Bring a current list of meds and any records pertaining to this visit.  For Physicals, please bring immunization records and any forms needing to be filled out.  Please arrive 10 minutes early to complete paperwork.            May 16, 2017  3:00 PM CDT   Pacemaker Check with RIOS DCR2   [facility] ([facility])    [address] [address] [phone]              Who to contact     If you have questions or need follow up information about today's clinic visit or your schedule please contact [facility] directly at [phone].  Normal or non-critical lab and imaging results will be communicated to you by [employer], letter or phone within 4 business days after the clinic has received the results. If you do not hear from us within 7 days, please contact the clinic through [employer] or phone. If you have a critical or abnormal lab result, we will notify you by phone as soon as possible.  Submit refill requests through [employer] or call your pharmacy and they will forward the refill request to us. Please allow 3 business days for your refill to be completed.          Additional Information About Your Visit        MicroCHIPS Information     Micro[employer]IPS lets you send messages to your doctor, view your  "test results, renew your prescriptions, schedule appointments and more. To sign up, go to [email or web address] . Click on \"Log in\" on the left side of the screen, which will take you to the Welcome page. Then click on \"Sign up Now\" on the right side of the page.     You will be asked to enter the access code listed below, as well as some personal information. Please follow the directions to create your username and password.     Your access code is: [email or web address]  Expires: 6/3/2017 12:04 AM     Your access code will  in 90 days. If you need help or a new code, please call your [facility] clinic or [phone].        Care EveryWhere ID     This is your Care EveryWhere ID. This could be used by other organizations to access your [facility] medical records  [id number]         Blood Pressure from Last 3 Encounters:   17 156/79   17 144/72   17 142/74    Weight from Last 3 Encounters:   17 73.9 kg (163 lb)   17 74 kg (163 lb 1.6 oz)   17 74.2 kg (163 lb 9.6 oz)              We Performed the Following     PM DEVICE PROGRAMMING EVAL, DUAL LEAD PACER ([id number])        Primary Care Provider Office Phone # Fax #    César Chung [phone]       [facility] [address]  [facility] [address]        Thank you!     Thank you for choosing [facility] PHYSIC[facility]ANS HE[facility] AT [place]  for your care. Our goal is always to provide you with excellent care. Hearing back from our patients is one way we can continue to improve our services. Please take a few minutes to complete the written survey that you may receive in the mail after your visit with us. Thank you!             Your Updated Medication List - Protect others around you: Learn how to safely use, store and throw away your medicines at www.disposemymeds.org.          This list is accurate as of: 17  3:36 PM.  Always use your most recent med list.                   Brand Name Dispense " Instructions for use    aspirin 81 MG chewable tablet      Take 81 mg by mouth       lisinopril 10 MG tablet    PRINIVIL/ZESTRIL    90 tablet    Take 1 tablet (10 mg) by mouth daily

## 2017-04-05 ENCOUNTER — TELEPHONE (OUTPATIENT)
Dept: CARDIOLOGY | Facility: CLINIC | Age: 82
End: 2017-04-05

## 2017-04-05 NOTE — TELEPHONE ENCOUNTER
Message  Received: Today       Alberto Worthington MD  [facility] Heart Ep Nurse                     Normal TSH.  Please update pt on results.  Alberto Worthington MD       Writer attempted to call pt with results as above, but no answer. No VM available to leave message. THADDEUS Weber RN.

## 2017-04-10 ENCOUNTER — TELEPHONE (OUTPATIENT)
Dept: CARDIOLOGY | Facility: CLINIC | Age: 82
End: 2017-04-10

## 2017-04-10 ENCOUNTER — OFFICE VISIT (OUTPATIENT)
Dept: CARDIOLOGY | Facility: CLINIC | Age: 82
End: 2017-04-10
Attending: INTERNAL MEDICINE
Payer: MEDICARE

## 2017-04-10 VITALS
BODY MASS INDEX: 29.81 KG/M2 | WEIGHT: 162 LBS | HEART RATE: 84 BPM | DIASTOLIC BLOOD PRESSURE: 64 MMHG | HEIGHT: 62 IN | SYSTOLIC BLOOD PRESSURE: 128 MMHG

## 2017-04-10 DIAGNOSIS — R07.89 ATYPICAL CHEST PAIN: Primary | ICD-10-CM

## 2017-04-10 DIAGNOSIS — R07.89 ATYPICAL CHEST PAIN: ICD-10-CM

## 2017-04-10 LAB — TROPONIN I SERPL-MCNC: NORMAL UG/L (ref 0–0.04)

## 2017-04-10 PROCEDURE — 84484 ASSAY OF TROPONIN QUANT: CPT | Performed by: NURSE PRACTITIONER

## 2017-04-10 PROCEDURE — 93000 ELECTROCARDIOGRAM COMPLETE: CPT | Performed by: NURSE PRACTITIONER

## 2017-04-10 PROCEDURE — 99214 OFFICE O/P EST MOD 30 MIN: CPT | Mod: 25 | Performed by: NURSE PRACTITIONER

## 2017-04-10 PROCEDURE — 36415 COLL VENOUS BLD VENIPUNCTURE: CPT | Performed by: NURSE PRACTITIONER

## 2017-04-10 RX ORDER — NITROGLYCERIN 0.4 MG/1
TABLET SUBLINGUAL
Qty: 25 TABLET | Refills: 0 | Status: SHIPPED | OUTPATIENT
Start: 2017-04-10 | End: 2017-04-10

## 2017-04-10 RX ORDER — NITROGLYCERIN 0.4 MG/1
TABLET SUBLINGUAL
Qty: 25 TABLET | Refills: 0 | Status: SHIPPED | OUTPATIENT
Start: 2017-04-10

## 2017-04-10 RX ORDER — ISOSORBIDE MONONITRATE 30 MG/1
30 TABLET, EXTENDED RELEASE ORAL DAILY
Qty: 30 TABLET | Refills: 0 | Status: SHIPPED | OUTPATIENT
Start: 2017-04-10 | End: 2017-04-10

## 2017-04-10 NOTE — PATIENT INSTRUCTIONS
-I will call with the troponin results  -Schedule stress test  - NTG from the pharmacy to use as needed

## 2017-04-10 NOTE — TELEPHONE ENCOUNTER
Granddaughter from out of town (presently in town) calling to report pt has ongoing indigestion. It initially started after exercise but is now present at rest also. 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 The stress echo was nondiagnostic due to inadequate heart rate due to AVB. She will have f/u with NP to discuss need for further testing.

## 2017-04-10 NOTE — PROGRESS NOTES
HPI and Plan:   See dictation    Orders Placed This Encounter   Procedures     NM Lexiscan stress test (nuc card)     Troponin I     EKG 12-lead complete w/read - Clinics (performed today)       Orders Placed This Encounter   Medications     DISCONTD: isosorbide mononitrate (IMDUR) 30 MG 24 hr tablet     Sig: Take 1 tablet (30 mg) by mouth daily     Dispense:  30 tablet     Refill:  0     DISCONTD: nitroglycerin (NITROSTAT) 0.4 MG sublingual tablet     Sig: For chest pain place 1 tablet under the tongue every 5 minutes for 3 doses. If symptoms persist 5 minutes after 1st dose call 911.     Dispense:  25 tablet     Refill:  0     nitroglycerin (NITROSTAT) 0.4 MG sublingual tablet     Sig: For chest pain place 1 tablet under the tongue every 5 minutes for 3 doses. If symptoms persist 5 minutes after 1st dose call 911.     Dispense:  25 tablet     Refill:  0       Medications Discontinued During This Encounter   Medication Reason     isosorbide mononitrate (IMDUR) 30 MG 24 hr tablet      nitroglycerin (NITROSTAT) 0.4 MG sublingual tablet Reorder         Encounter Diagnosis   Name Primary?     Atypical chest pain        CURRENT MEDICATIONS:  Current Outpatient Prescriptions   Medication Sig Dispense Refill     nitroglycerin (NITROSTAT) 0.4 MG sublingual tablet For chest pain place 1 tablet under the tongue every 5 minutes for 3 doses. If symptoms persist 5 minutes after 1st dose call 911. 25 tablet 0     lisinopril (PRINIVIL/ZESTRIL) 10 MG tablet Take 1 tablet (10 mg) by mouth daily 90 tablet 1     aspirin 81 MG chewable tablet Take 81 mg by mouth       [DISCONTINUED] nitroglycerin (NITROSTAT) 0.4 MG sublingual tablet For chest pain place 1 tablet under the tongue every 5 minutes for 3 doses. If symptoms persist 5 minutes after 1st dose call 911. 25 tablet 0       ALLERGIES   No Known Allergies    PAST MEDICAL HISTORY:  Past Medical History:   Diagnosis Date     PORTILLO (dyspnea on exertion)      HTN (hypertension)   "      PAST SURGICAL HISTORY:  No past surgical history on file.    FAMILY HISTORY:  Family History   Problem Relation Age of Onset     Unknown/Adopted Mother      Unknown/Adopted Father        SOCIAL HISTORY:  Social History     Social History     Marital status:      Spouse name: N/A     Number of children: N/A     Years of education: N/A     Social History Main Topics     Smoking status: Never Smoker     Smokeless tobacco: Never Used     Alcohol use No     Drug use: No     Sexual activity: No     Other Topics Concern     None     Social History Narrative       Review of Systems:  Skin:          Eyes:         ENT:         Respiratory:          Cardiovascular:         Gastroenterology:        Genitourinary:         Musculoskeletal:         Neurologic:         Psychiatric:         Heme/Lymph/Imm:         Endocrine:           Physical Exam:  Vitals: /64  Pulse 84  Ht 1.575 m (5' 2\")  Wt 73.5 kg (162 lb)  BMI 29.63 kg/m2    Constitutional:           Skin:           Head:           Eyes:           ENT:           Neck:           Chest:             Cardiac:                    Abdomen:           Vascular:                                          Extremities and Back:                 Neurological:                 CC  Soco Lentz MD   [facility]ANS HEART  [address]  [address]  [place] [address]              "

## 2017-04-10 NOTE — MR AVS SNAPSHOT
After Visit Summary   4/10/2017    Quiana Dunaway    MRN: [id number]           Patient Information     Date Of Birth          1935        Visit Information        Provider Department      4/10/2017 1:50 PM Jacqueline Webb APRN CNP [facility] PHYSIC[facility]ANS HEART AT [place]        Today's Diagnoses     Atypical chest pain          Care Instructions    -I will call with the troponin results  -Schedule stress test  - NTG from the pharmacy to use as needed         Follow-ups after your visit        Your next 10 appointments already scheduled     Apr 19, 2017  8:30 AM CDT   NM SH CV MPI WITH ALONSO 1 DAY with SCINM1   [facility] CV Nuclear Medicine (Cardiovascular Imaging at [facility])    [address]  [address]  [facility] [address] [phone]           For a ONE day exam: Allow 3-4 hours for test. For a TWO day exam: Allow 2 hours PER day for test.  You may need to stop some medicines before the test. Follow your doctor s orders. - If you take a beta blocker: Follow your doctor s specific instructions on taking it prior to and on the day of your exam. - If you take Aggrenox or dipyridamole (Persantine, Permole), stop taking it 48 hours before your test. - If you take Viagra, Cialis or Levitra, stop taking it 48 hours before your test. - If you take theophylline or aminophylline, stop taking it 12 hours before your test.  For patients with diabetes: - If you take insulin, call your diabetes care team. Ask if you should take a 1/2 dose the morning of your test. - If you take diabetes medicine by mouth, don t take it on the morning of your test. Bring it with you to take after the test. (If you have questions, call your diabetes care team.)  Do not take nitrates on the day of your test. Do not wear your Nitro-Patch.  Stop all caffeine 12 hours before the test. This includes coffee, tea, soda pop, chocolate and certain medicines (such as  Anacin, Excedrin and NoDoz). Also avoid decaf coffee and tea, as these contain small amounts of caffeine.  No alcohol, smoking or other tobacco for 12 hours before the test.  Stop eating 3 hours before the test. You may drink water.  Please wear a loose two-piece outfit. If you will have an exercise test, bring rubber-soled walking shoes.  When you arrive, please tell us if you: - Have diabetes - Are breastfeeding - May be pregnant - Have a pacemaker of ICD (implantable defibrillator).  Please call your Imaging Department at your exam site with any questions.            May 01, 2017  1:30 PM CDT   Office Visit with César Chung MD   [facility] ([facility])    [address] [place] [address] [phone]           Bring a current list of meds and any records pertaining to this visit.  For Physicals, please bring immunization records and any forms needing to be filled out.  Please arrive 10 minutes early to complete paperwork.            May 16, 2017  3:00 PM CDT   Pacemaker Check with RIOS DCR2   [facility] PHYSICIANS HEART AT [place] ([facility])    [id number]  [facility] [id number] [phone]              Future tests that were ordered for you today     Open Future Orders        Priority Expected Expires Ordered    NM Lexiscan stress test (nuc card) Routine 4/18/2017 4/10/2018 4/10/2017            Who to contact     If you have questions or need follow up information about today's clinic visit or your schedule please contact [facility] PHYS[facility] HE[facility] AT [place] directly at [phone].  Normal or non-critical lab and imaging results will be communicated to you by MyChart, letter or phone within 4 business days after the clinic has received the results. If you do not hear from us within 7 days, please contact the clinic through MyChart or phone. If you have a critical or abnormal lab result, we will notify you by  "phone as soon as possible.  Submit refill requests through GetBack or call your pharmacy and they will forward the refill request to us. Please allow 3 business days for your refill to be completed.          Additional Information About Your Visit        GetBack Information     GetBack lets you send messages to your doctor, view your test results, renew your prescriptions, schedule appointments and more. To sign up, go to [email or web address] . Click on \"Log in\" on the left side of the screen, which will take you to the Welcome page. Then click on \"Sign up Now\" on the right side of the page.     You will be asked to enter the access code listed below, as well as some personal information. Please follow the directions to create your username and password.     Your access code is: [email or web address]  Expires: 6/3/2017 12:04 AM     Your access code will  in 90 days. If you need help or a new code, please call your [facility] clinic or [phone].        Care EveryWhere ID     This is your Care EveryWhere ID. This could be used by other organizations to access your [facility] medical records  [id number]        Your Vitals Were     Pulse Height BMI (Body Mass Index)             84 1.575 m (5' 2\") 29.63 kg/m2          Blood Pressure from Last 3 Encounters:   04/10/17 128/64   17 156/79   17 144/72    Weight from Last 3 Encounters:   04/10/17 73.5 kg (162 lb)   17 73.9 kg (163 lb)   17 74 kg (163 lb 1.6 oz)              We Performed the Following     EKG 12-lead complete w/read - Clinics (performed today)     Follow-Up with Cardiac Advanced Practice Provider          Today's Medication Changes          These changes are accurate as of: 4/10/17  3:12 PM.  If you have any questions, ask your nurse or doctor.               Start taking these medicines.        Dose/Directions    nitroglycerin 0.4 MG sublingual tablet   Commonly known as:  NITROSTAT   Used for:  Atypical chest pain   Started by:  " Jacqueline Webb, MARIUSZ CNP        For chest pain place 1 tablet under the tongue every 5 minutes for 3 doses. If symptoms persist 5 minutes after 1st dose call 911.   Quantity:  25 tablet   Refills:  0            Where to get your medicines      These medications were sent to [facility], [place] - [address]  [address], [place] [address]     Phone:  [phone]     nitroglycerin 0.4 MG sublingual tablet                Primary Care Provider Office Phone # Fax #    César Chung [phone]       [facility] [address]  [place] [address]        Thank you!     Thank you for choosing [facility] PHYSICIANS HEART AT [place]  for your care. Our goal is always to provide you with excellent care. Hearing back from our patients is one way we can continue to improve our services. Please take a few minutes to complete the written survey that you may receive in the mail after your visit with us. Thank you!             Your Updated Medication List - Protect others around you: Learn how to safely use, store and throw away your medicines at www.disposemymeds.org.          This list is accurate as of: 4/10/17  3:12 PM.  Always use your most recent med list.                   Brand Name Dispense Instructions for use    aspirin 81 MG chewable tablet      Take 81 mg by mouth       lisinopril 10 MG tablet    PRINIVIL/ZESTRIL    90 tablet    Take 1 tablet (10 mg) by mouth daily       nitroglycerin 0.4 MG sublingual tablet    NITROSTAT    25 tablet    For chest pain place 1 tablet under the tongue every 5 minutes for 3 doses. If symptoms persist 5 minutes after 1st dose call 911.

## 2017-04-10 NOTE — LETTER
4/10/2017    César Chung MD  [facility]   [address]  [facility] [address]    RE: Quiana Dunaway       Dear Colleague,    I had the pleasure of seeing Quiana Dunaway in the [facility].    Quiana Dunaway is a delightful 81-year-old patient presenting in clinic today at her request for evaluation of symptoms.  She was first seen in our office by Dr. Worthington on 03/22/2017 regarding high degree AV block.      She had been experiencing dyspnea on exertion for the past several months.  She was evaluated by her PMD who ordered testing including a stress echo.  During the exercise portion of the stress echo, she developed second-degree heart block type 2.  She then went on to see Dr. Worthington who recommended permanent pacemaker implantation.  This went off without any complication.  She contacted our Device Clinic shortly after the pacemaker was placed, still complaining that her breathlessness had not resolved.  She had some changes made to her device to make it more sensitive in hopes that her breathlessness would resolve.  Additionally, she was noted to have swelling around her device site.      In clinic today, Quiana is here with her granddaughter.  Her granddaughter is visiting from [place] where she works as a nurse.  They tell me that Quiana has been having episodes of shortness of breath and indigestion.  The exertional dyspnea has been going on for a number of months.  Quiana is a very active and continues to work and therefore has noticed this change quite abruptly.  They were hoping she would get better with pacemaker placement but this has not been the case.  They also tell me that the indigestion will sometimes come on with exertion along with the shortness of breath.  It is not always predictable.  She has tried Tums and these have not resolved her symptoms.  Typically rest will resolve the symptoms.  She has not had any symptoms at rest.  She has no history of  GERD or indigestion per her report.        I reviewed the cardiac testing she had done recently.  The stress echo was actually aborted prior to the full test being completed due to the development of heart block.  It did show her ejection fraction was greater than 70%.  She had mild MR.  She had a nuclear stress test performed a number of years ago which was without any ischemia.      Her blood pressure is 120/64, heart rate 84 beats per minute and regular and weight 160 pounds.      EKG performed in our office shows an atrial paced rhythm without any significant ST changes.      PHYSICAL EXAMINATION:   GENERAL:  Reveals a well-appearing elderly female in no acute distress.   HEENT:  Normocephalic, atraumatic.   NECK:  Supple without jugular venous distention or carotid bruits.   LUNGS:  Clear to auscultation bilaterally.   HEART:  Reveals an S1 and S2 with a regular rate and rhythm.  The pacemaker site shows a well-healed incision without erythema or drainage and a small hematoma overlying the generator.   ABDOMEN:  Soft, nontender, nondistended.   EXTREMITIES:  Lower extremities are free of any edema.     Outpatient Encounter Prescriptions as of 4/10/2017   Medication Sig Dispense Refill     nitroglycerin (NITROSTAT) 0.4 MG sublingual tablet For chest pain place 1 tablet under the tongue every 5 minutes for 3 doses. If symptoms persist 5 minutes after 1st dose call 911. 25 tablet 0     lisinopril (PRINIVIL/ZESTRIL) 10 MG tablet Take 1 tablet (10 mg) by mouth daily 90 tablet 1     aspirin 81 MG chewable tablet Take 81 mg by mouth       [DISCONTINUED] isosorbide mononitrate (IMDUR) 30 MG 24 hr tablet Take 1 tablet (30 mg) by mouth daily 30 tablet 0     [DISCONTINUED] nitroglycerin (NITROSTAT) 0.4 MG sublingual tablet For chest pain place 1 tablet under the tongue every 5 minutes for 3 doses. If symptoms persist 5 minutes after 1st dose call 911. 25 tablet 0     No facility-administered encounter medications on file  "as of 4/10/2017.       IMPRESSION AND PLAN:  Ms. Dunaway is a delightful 81-year-old who presents in clinic today at her request for followup regarding symptoms of dyspnea and indigestion.  She was recently found to have second-degree heart block type 2 and underwent permanent pacemaker implantation.  Presenting symptoms were dyspnea on exertion.  These symptoms have not improved post-pacemaker despite changes made to the device settings.  She is now having some issues with  \"indigestion.\"  This is not resolved by Tums.  She has never had indigestion before that she can recall.  Typically these episodes are brought on by exertion and go away at rest.  Fortunately, the EKG today is unremarkable.  Stat troponin is negative.  I have given her a prescription for nitroglycerin and told her to continue on her aspirin for the time being.  We will obtain a stress test next week when she can raise her arms above the level of her head.  In the interim, should she develop any symptoms at rest or acute worsening symptoms with exertion that are not resolved, she knows to present to the emergency room.  We would consider adding a statin to her regimen based on the stress testing results.      Again, thank you for allowing me to participate in the care of your patient.      Sincerely,    Jacqueline Webb, TIN, APRN CNP     "

## 2017-04-11 NOTE — TELEPHONE ENCOUNTER
Writer attempted to call pt with normal results as below, but no answer and no VM. Pt has seen Jacqueline Webb NP for OV since these results noted. Will close this encounter. THADDEUS Weber RN.

## 2017-04-11 NOTE — PROGRESS NOTES
HISTORY OF PRESENT ILLNESS:  Quiana Dunaway is a delightful 81-year-old patient presenting in clinic today at her request for evaluation of symptoms.  She was first seen in our office by Dr. Worthington on 03/22/2017 regarding high degree AV block.      She had been experiencing dyspnea on exertion for the past several months.  She was evaluated by her PMD who ordered testing including a stress echo.  During the exercise portion of the stress echo, she developed second-degree heart block type 2.  She then went on to see Dr. Worthington who recommended permanent pacemaker implantation.  This went off without any complication.  She contacted our [facility] shortly after the pacemaker was placed, still complaining that her breathlessness had not resolved.  She had some changes made to her device to make it more sensitive in hopes that her breathlessness would resolve.  Additionally, she was noted to have swelling around her device site.      In clinic today, Quiana is here with her granddaughter.  Her granddaughter is visiting from [place] where she works as a nurse.  They tell me that Quiana has been having episodes of shortness of breath and indigestion.  The exertional dyspnea has been going on for a number of months.  Quiana is a very active and continues to work and therefore has noticed this change quite abruptly.  They were hoping she would get better with pacemaker placement but this has not been the case.  They also tell me that the indigestion will sometimes come on with exertion along with the shortness of breath.  It is not always predictable.  She has tried Tums and these have not resolved her symptoms.  Typically rest will resolve the symptoms.  She has not had any symptoms at rest.  She has no history of GERD or indigestion per her report.        I reviewed the cardiac testing she had done recently.  The stress echo was actually aborted prior to the full test being completed due to the development of heart block.   "It did show her ejection fraction was greater than 70%.  She had mild MR.  She had a nuclear stress test performed a number of years ago which was without any ischemia.      Her blood pressure is 120/64, heart rate 84 beats per minute and regular and weight 160 pounds.      EKG performed in our office shows an atrial paced rhythm without any significant ST changes.      PHYSICAL EXAMINATION:   GENERAL:  Reveals a well-appearing elderly female in no acute distress.   HEENT:  Normocephalic, atraumatic.   NECK:  Supple without jugular venous distention or carotid bruits.   LUNGS:  Clear to auscultation bilaterally.   HEART:  Reveals an S1 and S2 with a regular rate and rhythm.  The pacemaker site shows a well-healed incision without erythema or drainage and a small hematoma overlying the generator.   ABDOMEN:  Soft, nontender, nondistended.   EXTREMITIES:  Lower extremities are free of any edema.      IMPRESSION AND PLAN:  Ms. Dunaway is a delightful 81-year-old who presents in clinic today at her request for followup regarding symptoms of dyspnea and indigestion.  She was recently found to have second-degree heart block type 2 and underwent permanent pacemaker implantation.  Presenting symptoms were dyspnea on exertion.  These symptoms have not improved post-pacemaker despite changes made to the device settings.  She is now having some issues with  \"indigestion.\"  This is not resolved by Tums.  She has never had indigestion before that she can recall.  Typically these episodes are brought on by exertion and go away at rest.  Fortunately, the EKG today is unremarkable.  Stat troponin is negative.  I have given her a prescription for nitroglycerin and told her to continue on her aspirin for the time being.  We will obtain a stress test next week when she can raise her arms above the level of her head.  In the interim, should she develop any symptoms at rest or acute worsening symptoms with exertion that are not " resolved, she knows to present to the emergency room.  We would consider adding a statin to her regimen based on the stress testing results.         MARIUZS BUTT, MICHAEL             D: 04/10/2017 15:56   T: 2017 07:42   MT: MARTA      Name:     DUGLAS CASTILLO   MRN:      [id number]        Account:      [id number]   :      1935           Service Date: 04/10/2017      Document: [id number]

## 2017-04-19 ENCOUNTER — HOSPITAL ENCOUNTER (OUTPATIENT)
Dept: CARDIOLOGY | Facility: CLINIC | Age: 82
Discharge: HOME | End: 2017-04-19
Attending: NURSE PRACTITIONER | Admitting: NURSE PRACTITIONER
Payer: MEDICARE

## 2017-04-19 ENCOUNTER — TELEPHONE (OUTPATIENT)
Dept: CARDIOLOGY | Facility: CLINIC | Age: 82
End: 2017-04-19

## 2017-04-19 VITALS — SYSTOLIC BLOOD PRESSURE: 126 MMHG | HEART RATE: 66 BPM | DIASTOLIC BLOOD PRESSURE: 82 MMHG

## 2017-04-19 DIAGNOSIS — R06.00 DYSPNEA: Primary | ICD-10-CM

## 2017-04-19 DIAGNOSIS — R07.89 ATYPICAL CHEST PAIN: ICD-10-CM

## 2017-04-19 PROCEDURE — 34300033 ZZH RX 343: Performed by: INTERNAL MEDICINE

## 2017-04-19 PROCEDURE — 93016 CV STRESS TEST SUPVJ ONLY: CPT | Performed by: INTERNAL MEDICINE

## 2017-04-19 PROCEDURE — 78452 HT MUSCLE IMAGE SPECT MULT: CPT | Mod: 26 | Performed by: INTERNAL MEDICINE

## 2017-04-19 PROCEDURE — 93018 CV STRESS TEST I&R ONLY: CPT | Performed by: INTERNAL MEDICINE

## 2017-04-19 PROCEDURE — A9502 TC99M TETROFOSMIN: HCPCS | Performed by: INTERNAL MEDICINE

## 2017-04-19 PROCEDURE — 25000128 H RX IP 250 OP 636: Performed by: INTERNAL MEDICINE

## 2017-04-19 PROCEDURE — 93017 CV STRESS TEST TRACING ONLY: CPT

## 2017-04-19 RX ORDER — AMINOPHYLLINE 25 MG/ML
50-100 INJECTION, SOLUTION INTRAVENOUS
Status: COMPLETED | OUTPATIENT
Start: 2017-04-19 | End: 2017-04-19

## 2017-04-19 RX ORDER — ACYCLOVIR 200 MG/1
0-1 CAPSULE ORAL
Status: DISCONTINUED | OUTPATIENT
Start: 2017-04-19 | End: 2017-04-20 | Stop reason: HOSPADM

## 2017-04-19 RX ORDER — REGADENOSON 0.08 MG/ML
0.4 INJECTION, SOLUTION INTRAVENOUS ONCE
Status: COMPLETED | OUTPATIENT
Start: 2017-04-19 | End: 2017-04-19

## 2017-04-19 RX ORDER — ALBUTEROL SULFATE 90 UG/1
2 AEROSOL, METERED RESPIRATORY (INHALATION) EVERY 5 MIN PRN
Status: DISCONTINUED | OUTPATIENT
Start: 2017-04-19 | End: 2017-04-20 | Stop reason: HOSPADM

## 2017-04-19 RX ADMIN — TETROFOSMIN 9.12 MCI.: 0.23 INJECTION, POWDER, LYOPHILIZED, FOR SOLUTION INTRAVENOUS at 10:37

## 2017-04-19 RX ADMIN — REGADENOSON 0.4 MG: 0.08 INJECTION, SOLUTION INTRAVENOUS at 10:34

## 2017-04-19 RX ADMIN — AMINOPHYLLINE 100 MG: 25 INJECTION, SOLUTION INTRAVENOUS at 10:41

## 2017-04-19 RX ADMIN — TETROFOSMIN 3.59 MCI.: 0.23 INJECTION, POWDER, LYOPHILIZED, FOR SOLUTION INTRAVENOUS at 09:09

## 2017-04-19 NOTE — TELEPHONE ENCOUNTER
Called pt to review nuc results from today showing, normal myocardial perfusion, normal wall motion and left ventricular systolic function is normal with a calculated ejection fraction of 68%.  Pt continues to have questions regarding the cause of her PORTILLO and requests recommendations for further f/u.    She has scheduled f/u with PMD on 5/1.  Messaged to [facility] to review and advise.  KMortimer RN

## 2017-04-19 NOTE — LETTER
[facility]  [address], [address]  [place]  [address]  Phone: [phone]  Fax: [phone]         4/28/2017    Quiana Dunaway  [address] [place] [address]  [address]    Dear Ms. Dunaway,    This letter is in regards to your recent nuclear stress test that showed normal myocardial perfusion, normal wall motion and left ventricular systolic function is normal with a calculated ejection fraction of 68%.  You were called with these results on 4/19 and at that time you had requested further recommendations for follow up on your shortness of breath.  This was reviewed with Jacqueline Webb CNP and her recommendations are to have an echocardiogram and evaluation by pulmonology as suggested by Dr. Chung if you have not done so already.  We have made three attempts to review this information over the phone, but have been unable to contact you or leave a message.  You are scheduled for an appointment with Dr. Chung on 5/1.  These recommendations can be reviewed with him at that time.      The order for the echo has been placed in our system.  If you would like to schedule this, please contact our scheduling department at [phone] and they can help to arrange that appointment.    Please let us know if you have any further questions.      Sincerely,      [facility]  [phone]

## 2017-04-24 ENCOUNTER — TELEPHONE (OUTPATIENT)
Dept: FAMILY MEDICINE | Facility: CLINIC | Age: 82
End: 2017-04-24

## 2017-04-24 NOTE — TELEPHONE ENCOUNTER
Reason for Call:  Other colonoscopy    Detailed comments: pt had a pace maker put in 3 weeks ago and still does  Not feel very strong. She has an upcoming colonoscopy and wants to   Know if she should go ahead with the procedure.    Phone Number Patient can be reached at: Home number on file [phone] (home)    Best Time: anytime    Can we leave a detailed message on this number? YES    Call taken on 4/24/2017 at 1:39 PM by Daija Chang

## 2017-04-24 NOTE — TELEPHONE ENCOUNTER
Let's do a full echo to be thorough.  Nuc showed normal LVEF but echo from 2014 showed some mild valve disease and mild PHTN.  Would suggest she also have the evaluation by pulmonology as suggested by Dr. Chung if she has not done so already.  Thanks.

## 2017-04-25 NOTE — TELEPHONE ENCOUNTER
Tried calling Pt back at number below. Phone rang multiple times, no answer.   Unable to leave message. Will need to recall.     Jessica Agrawal RN

## 2017-04-25 NOTE — TELEPHONE ENCOUNTER
Called pt to review RODRIGUEelacic's recommendations, no answer and no VM to leave message.  Will try back.  KMortimer RN

## 2017-04-25 NOTE — TELEPHONE ENCOUNTER
Jackie Floyd (daughter in law called to see if PCP had reached out to the Pt)  Advised her that there is no consent to communicate on file so we cannot release that information to her. I did tell her that I would reach out to the Pt again tonight (2 attempts have been made today)    I did reach the Pt and relayed the below information from PCP. She will proceed with the colonoscopy per PCP's recommendation.   Also advised her to fill out CTC form at her upcoming OV with PCP on 5/1. Appointment note updated with this request.   Advised Pt can call back if she has further questions or concerns.     Jessica Agrawal RN

## 2017-04-25 NOTE — TELEPHONE ENCOUNTER
I tried calling Quiana at her work ([employer]) and her home and her other work place ([employer]) and could not track her down.        I think she should try her best to prep for the colonoscopy and proceed with the test    If she thinks she is too sick, then she should schedule an office visit appointment with me this week or next week    Same day slot or hospital follow up slot is ok

## 2017-04-25 NOTE — TELEPHONE ENCOUNTER
Quiana can be reached at [phone]  Daughter Jackie called again today and she was very upset and anxious because they have not received a call back from Dr. Chung yet  They want to hear from Him about her having this procedure on Thursday 4/27  They want to hear from Dr. Chung Himself

## 2017-04-25 NOTE — TELEPHONE ENCOUNTER
PCP:    Would you be able to call this Patient directly?     Otherwise please advise regarding the telephone message below.    Thank you    Marie Resendiz RN

## 2017-04-26 NOTE — TELEPHONE ENCOUNTER
Second attempt to contact pt with [facility] recommendation for echo and f/u with pulmonolary. No answer and no VM to leave message.  KMortimer RN

## 2017-04-28 ENCOUNTER — TRANSFERRED RECORDS (OUTPATIENT)
Dept: HEALTH INFORMATION MANAGEMENT | Facility: CLINIC | Age: 82
End: 2017-04-28

## 2017-05-01 ENCOUNTER — OFFICE VISIT (OUTPATIENT)
Dept: FAMILY MEDICINE | Facility: CLINIC | Age: 82
End: 2017-05-01
Payer: MEDICARE

## 2017-05-01 VITALS
WEIGHT: 162 LBS | HEIGHT: 62 IN | HEART RATE: 66 BPM | BODY MASS INDEX: 29.81 KG/M2 | OXYGEN SATURATION: 96 % | DIASTOLIC BLOOD PRESSURE: 64 MMHG | SYSTOLIC BLOOD PRESSURE: 117 MMHG | TEMPERATURE: 98.7 F

## 2017-05-01 DIAGNOSIS — R06.09 DOE (DYSPNEA ON EXERTION): Primary | ICD-10-CM

## 2017-05-01 DIAGNOSIS — I10 BENIGN ESSENTIAL HYPERTENSION: ICD-10-CM

## 2017-05-01 DIAGNOSIS — D64.9 ANEMIA, UNSPECIFIED: ICD-10-CM

## 2017-05-01 DIAGNOSIS — E05.90 HYPERTHYROIDISM: ICD-10-CM

## 2017-05-01 LAB
ALBUMIN SERPL-MCNC: 3.8 G/DL (ref 3.4–5)
ALP SERPL-CCNC: 100 U/L (ref 40–150)
ALT SERPL W P-5'-P-CCNC: 18 U/L (ref 0–50)
ANION GAP SERPL CALCULATED.3IONS-SCNC: 6 MMOL/L (ref 3–14)
AST SERPL W P-5'-P-CCNC: 16 U/L (ref 0–45)
BILIRUB SERPL-MCNC: 0.4 MG/DL (ref 0.2–1.3)
BUN SERPL-MCNC: 17 MG/DL (ref 7–30)
CALCIUM SERPL-MCNC: 8.9 MG/DL (ref 8.5–10.1)
CHLORIDE SERPL-SCNC: 107 MMOL/L (ref 94–109)
CO2 SERPL-SCNC: 27 MMOL/L (ref 20–32)
CREAT SERPL-MCNC: 0.66 MG/DL (ref 0.52–1.04)
FERRITIN SERPL-MCNC: 98 NG/ML (ref 8–252)
GFR SERPL CREATININE-BSD FRML MDRD: 87 ML/MIN/1.7M2
GLUCOSE SERPL-MCNC: 82 MG/DL (ref 70–99)
IRON SATN MFR SERPL: 19 % (ref 15–46)
IRON SERPL-MCNC: 66 UG/DL (ref 35–180)
POTASSIUM SERPL-SCNC: 3.6 MMOL/L (ref 3.4–5.3)
PROT SERPL-MCNC: 8.8 G/DL (ref 6.8–8.8)
RETICS # AUTO: 54.9 10E9/L (ref 25–95)
RETICS/RBC NFR AUTO: 1.7 % (ref 0.5–2)
SODIUM SERPL-SCNC: 140 MMOL/L (ref 133–144)
TIBC SERPL-MCNC: 347 UG/DL (ref 240–430)
TSH SERPL DL<=0.05 MIU/L-ACNC: 0.22 MU/L (ref 0.4–4)
VIT B12 SERPL-MCNC: 377 PG/ML (ref 193–986)

## 2017-05-01 PROCEDURE — 84443 ASSAY THYROID STIM HORMONE: CPT | Performed by: INTERNAL MEDICINE

## 2017-05-01 PROCEDURE — 80053 COMPREHEN METABOLIC PANEL: CPT | Performed by: INTERNAL MEDICINE

## 2017-05-01 PROCEDURE — 85060 BLOOD SMEAR INTERPRETATION: CPT | Performed by: INTERNAL MEDICINE

## 2017-05-01 PROCEDURE — 82728 ASSAY OF FERRITIN: CPT | Performed by: INTERNAL MEDICINE

## 2017-05-01 PROCEDURE — 40000847 ZZHCL STATISTIC MORPHOLOGY W/INTERP HISTOLOGY TC 85060: Performed by: INTERNAL MEDICINE

## 2017-05-01 PROCEDURE — 85025 COMPLETE CBC W/AUTO DIFF WBC: CPT | Performed by: INTERNAL MEDICINE

## 2017-05-01 PROCEDURE — 36415 COLL VENOUS BLD VENIPUNCTURE: CPT | Performed by: INTERNAL MEDICINE

## 2017-05-01 PROCEDURE — 82607 VITAMIN B-12: CPT | Performed by: INTERNAL MEDICINE

## 2017-05-01 PROCEDURE — 83550 IRON BINDING TEST: CPT | Performed by: INTERNAL MEDICINE

## 2017-05-01 PROCEDURE — 99213 OFFICE O/P EST LOW 20 MIN: CPT | Performed by: INTERNAL MEDICINE

## 2017-05-01 PROCEDURE — 85045 AUTOMATED RETICULOCYTE COUNT: CPT | Performed by: INTERNAL MEDICINE

## 2017-05-01 PROCEDURE — 83010 ASSAY OF HAPTOGLOBIN QUANT: CPT | Performed by: INTERNAL MEDICINE

## 2017-05-01 PROCEDURE — 83540 ASSAY OF IRON: CPT | Performed by: INTERNAL MEDICINE

## 2017-05-01 PROCEDURE — 82746 ASSAY OF FOLIC ACID SERUM: CPT | Performed by: INTERNAL MEDICINE

## 2017-05-01 NOTE — MR AVS SNAPSHOT
"              After Visit Summary   5/1/2017    Quiana Dunaway    MRN: [id number]           Patient Information     Date Of Birth          1935        Visit Information        Provider Department      5/1/2017 1:30 PM César Chung MD [facility]        Today's Diagnoses     PORTILLO (dyspnea on exertion)    -  1    Anemia, unspecified        Benign essential hypertension           Follow-ups after your visit        Your next 10 appointments already scheduled     May 16, 2017  3:00 PM CDT   Pacemaker Check with RIOS DCR2   [facility]    [address] [place], [place] [id number]  [facility] [address] [phone]              Who to contact     If you have questions or need follow up information about today's clinic visit or your schedule please contact [facility] directly at [phone].  Normal or non-critical lab and imaging results will be communicated to you by MyChart, letter or phone within 4 business days after the clinic has received the results. If you do not hear from us within 7 days, please contact the clinic through MyChart or phone. If you have a critical or abnormal lab result, we will notify you by phone as soon as possible.  Submit refill requests through [employer] or call your pharmacy and they will forward the refill request to us. Please allow 3 business days for your refill to be completed.          Additional Information About Your Visit        MyChart Information     [employer] lets you send messages to your doctor, view your test results, renew your prescriptions, schedule appointments and more. To sign up, go to [email or web address] . Click on \"Log in\" on the left side of the screen, which will take you to the Welcome page. Then click on \"Sign up Now\" on the right side of the page.     You will be asked to enter the access code listed below, as well as some personal information. Please follow the directions to " "create your username and password.     Your access code is: [email or web address]  Expires: 6/3/2017 12:04 AM     Your access code will  in 90 days. If you need help or a new code, please call your [facility] or [phone].        Care EveryWhere ID     This is your Care EveryWhere ID. This could be used by other organizations to access your [facility] medical records  [id number]        Your Vitals Were     Pulse Temperature Height Pulse Oximetry Breastfeeding? BMI (Body Mass Index)    66 98.7  F (37.1  C) (Tympanic) 5' 2\" (1.575 m) 96% No 29.63 kg/m2       Blood Pressure from Last 3 Encounters:   17 117/64   17 126/82   04/10/17 128/64    Weight from Last 3 Encounters:   17 162 lb (73.5 kg)   04/10/17 162 lb (73.5 kg)   17 163 lb (73.9 kg)              We Performed the Following     Blood Morphology Pathologist Review     CBC with platelets differential     Comprehensive metabolic panel     Ferritin     Folate     Haptoglobin     Iron and iron binding capacity     Reticulocyte Count     TSH     Vitamin B12        Primary Care Provider Office Phone # Fax #    César Chung [phone]       [facility] [address]  [facility] [address]        Thank you!     Thank you for choosing [facility]  for your care. Our goal is always to provide you with excellent care. Hearing back from our patients is one way we can continue to improve our services. Please take a few minutes to complete the written survey that you may receive in the mail after your visit with us. Thank you!             Your Updated Medication List - Protect others around you: Learn how to safely use, store and throw away your medicines at www.disposemymeds.org.          This list is accurate as of: 17  2:16 PM.  Always use your most recent med list.                   Brand Name Dispense Instructions for use    aspirin 81 MG chewable tablet      Take 81 mg by mouth       lisinopril 10 " MG tablet    PRINIVIL/ZESTRIL    90 tablet    Take 1 tablet (10 mg) by mouth daily       nitroglycerin 0.4 MG sublingual tablet    NITROSTAT    25 tablet    For chest pain place 1 tablet under the tongue every 5 minutes for 3 doses. If symptoms persist 5 minutes after 1st dose call 911.

## 2017-05-01 NOTE — PROGRESS NOTES
SUBJECTIVE:                                                    Quiana Dunaway is a 81 year old female who presents to clinic today for the following health issues:    Hypertension Follow-up      Outpatient blood pressures are being checked at home.  Results are normal.    Low Salt Diet: low salt       Amount of exercise or physical activity: None    Problems taking medications regularly: No    Medication side effects: none    Diet: regular (no restrictions)      This is a pleasant 81-year-old female who recently received a pacemaker when second-degree heart block was noted during a stress test to evaluate dyspnea on exertion symptoms. She tolerated the pacemaker placement procedure well without issue, but her dyspnea on exertion symptoms remain unchanged following the procedure. She had a stress test completed following the pacemaker placement demonstrating no abnormalities when compared to her most recent stress test which was performed in 2013. She had a normal echocardiogram performed at [facility] earlier this year with Dr. Martinez Rios.  She had a CT of the chest demonstrating no pulmonary emboli and incidental findings of a previously identified thyroid nodule and pancreatic cysts which require MRI follow-up in one year's time. Laboratory investigations demonstrated a moderate anemia with low normal iron studies. The patient has had a colonoscopy last week demonstrating multiple polyps. 2 year follow-up was recommended by her gastroenterologist. Since no source of the iron deficiency anemia could be identified, upper gastrointestinal endoscopy in addition to possible pill endoscopy was recommended by her gastroenterologist. She is scheduling her upper endoscopy procedure. She has yet to have pulmonary function testing performed. She continues to complain of mild to moderate shortness of breath associated with climbing stairs or walking long distances without associated chest pains or  "palpitations.    Problem list and histories reviewed & adjusted, as indicated.  Additional history: as documented    Patient Active Problem List   Diagnosis     Benign essential hypertension     No past surgical history on file.    Social History   Substance Use Topics     Smoking status: Never Smoker     Smokeless tobacco: Never Used     Alcohol use No     Family History   Problem Relation Age of Onset     Unknown/Adopted Mother      Unknown/Adopted Father          Current Outpatient Prescriptions   Medication Sig Dispense Refill     nitroglycerin (NITROSTAT) 0.4 MG sublingual tablet For chest pain place 1 tablet under the tongue every 5 minutes for 3 doses. If symptoms persist 5 minutes after 1st dose call 911. 25 tablet 0     lisinopril (PRINIVIL/ZESTRIL) 10 MG tablet Take 1 tablet (10 mg) by mouth daily 90 tablet 1     aspirin 81 MG chewable tablet Take 81 mg by mouth       Allergies   Allergen Reactions     No Known Allergies        Reviewed and updated as needed this visit by clinical staff       Reviewed and updated as needed this visit by Provider         ROS:  Constitutional, HEENT, cardiovascular, pulmonary, gi and gu systems are negative, except as otherwise noted.    OBJECTIVE:                                                    /64 (BP Location: Right arm, Cuff Size: Adult Regular)  Pulse 66  Temp 98.7  F (37.1  C) (Tympanic)  Ht 5' 2\" (1.575 m)  Wt 162 lb (73.5 kg)  SpO2 96%  Breastfeeding? No  BMI 29.63 kg/m2  Body mass index is 29.63 kg/(m^2).  GENERAL: healthy, alert and no distress  RESP: lungs clear to auscultation - no rales, rhonchi or wheezes  CV: regular rate and rhythm, normal S1 S2, no S3 or S4, no murmur, click or rub, no peripheral edema and peripheral pulses strong  MS: no gross musculoskeletal defects noted, no edema  NEURO: Normal strength and tone, mentation intact and speech normal  PSYCH: mentation appears normal, affect normal/bright    Diagnostic Test Results:  Results " for orders placed or performed during the hospital encounter of 04/19/17   NM Lexiscan stress test (nuc card)    Narrative    GATED MYOCARDIAL PERFUSION SCINTIGRAPHY WITH INTRAVENOUS PHARMACOLOGIC  VASODILATATION LEXISCAN -ONE DAY STUDY     4/19/2017 10:39 AM  DUGLAS CASTILLO  81 years  Female  1935.    Indication/Clinical History: Chest pain    Impression  1.  Myocardial perfusion imaging using single isotope technique  demonstrated normal myocardial perfusion.   2. Gated images demonstrated normal wall motion.  The left ventricular  systolic function is normal with a calculated ejection fraction of  68%.  3. Compared to the prior study from 2013, no significant changes are  noted .    Procedure  Pharmacologic stress testing was performed with Lexiscan at a rate of  0.08 mg/ml rapid bolus injection, for 15 seconds, 0.4 mg/5ml  intravenously. Low-level exercise was not performed along with the  vasodilator infusion.  The heart rate was 70 at baseline and day to  87 beats per minute during the Lexiscan infusion. The rest blood  pressure was 126/82 mmHg and was 138/78 mm Hg during Lexiscan  infusion. The patient experienced shortness of breath and abdominal  discomfort, headache  during the test.    Myocardial perfusion imaging was performed at rest, approximately 45  minutes after the injection intravenously of 3.58 mCi of Tc-99m  Myoview. At peak pharmacologic effect, 10-20 seconds after Lexiscan,   the patient was injected intravenously with 9.12 mCi of  Tc-99m  Myoview. The post-stress tomographic imaging was performed  approximately 60 minutes after stress.    EKG Findings  The resting EKG demonstrated sinus rhythm with nonspecific ST segment  changes and delayed R-wave progression. The stress EKG demonstrated no  significant ST segment changes.    Tomographic Findings  Overall, the study quality is good . On the stress images, normal  myocardial perfusion appeared present. On the rest images,  normal  myocardial perfusion was again noted . Gated images demonstrated  normal wall motion. The left ventricular ejection fraction was  calculated to be 68%. TID was absent.    CODY FERRER MD        ASSESSMENT/PLAN:                                                            1. PORTILLO (dyspnea on exertion)  Etiology remains unclear, proceed with pulmonary function testing, further evaluate anemia, continue with gastroenterology investigation for possible sources of GI blood loss    2. Anemia, unspecified    - Comprehensive metabolic panel  - CBC with platelets differential  - Blood Morphology Pathologist Review  - Reticulocyte Count  - TSH  - Vitamin B12  - Folate  - Haptoglobin  - Iron and iron binding capacity  - Ferritin      If above evaluation does not reveal cause for anemia, consider hematology evaluation for consideration of bone marrow biopsy given persistent moderate anemia      3. Benign essential hypertension  Under good control      I spent about 20 minutes with this patient and her daughter-in-law in face-to-face contact discussing further evaluation and rationale for further diagnostic testing    FUTURE APPOINTMENTS:       - Pending above evaluation and symptoms    César Chung MD  [facility]

## 2017-05-01 NOTE — NURSING NOTE
"Chief Complaint   Patient presents with     Follow Up For     EKG and Colonoscopy results. Pulmonary visit was cancelled by Cardiologist.       Initial /64 (BP Location: Right arm, Cuff Size: Adult Regular)  Pulse 66  Temp 98.7  F (37.1  C) (Tympanic)  Ht 5' 2\" (1.575 m)  Wt 162 lb (73.5 kg)  SpO2 96%  Breastfeeding? No  BMI 29.63 kg/m2 Estimated body mass index is 29.63 kg/(m^2) as calculated from the following:    Height as of this encounter: 5' 2\" (1.575 m).    Weight as of this encounter: 162 lb (73.5 kg).  Medication Reconciliation: complete     Bonny Zhang MA    "

## 2017-05-02 LAB
COPATH REPORT: NORMAL
FOLATE SERPL-MCNC: 19.4 NG/ML
HAPTOGLOB SERPL-MCNC: 130 MG/DL (ref 35–175)

## 2017-05-02 PROCEDURE — 84480 ASSAY TRIIODOTHYRONINE (T3): CPT | Performed by: INTERNAL MEDICINE

## 2017-05-02 PROCEDURE — 36415 COLL VENOUS BLD VENIPUNCTURE: CPT | Performed by: INTERNAL MEDICINE

## 2017-05-02 PROCEDURE — 84439 ASSAY OF FREE THYROXINE: CPT | Performed by: INTERNAL MEDICINE

## 2017-05-03 LAB
DIFFERENTIAL METHOD BLD: ABNORMAL
ERYTHROCYTE [DISTWIDTH] IN BLOOD BY AUTOMATED COUNT: 12.6 % (ref 10–15)
HCT VFR BLD AUTO: 31.1 % (ref 35–47)
HGB BLD-MCNC: 10.2 G/DL (ref 11.7–15.7)
LYMPHOCYTES # BLD AUTO: 1.4 10E9/L (ref 0.8–5.3)
LYMPHOCYTES NFR BLD AUTO: 41 %
MCH RBC QN AUTO: 31.4 PG (ref 26.5–33)
MCHC RBC AUTO-ENTMCNC: 32.8 G/DL (ref 31.5–36.5)
MCV RBC AUTO: 96 FL (ref 78–100)
MONOCYTES # BLD AUTO: 0.1 10E9/L (ref 0–1.3)
MONOCYTES NFR BLD AUTO: 3 %
NEUTROPHILS # BLD AUTO: 1.9 10E9/L (ref 1.6–8.3)
NEUTROPHILS NFR BLD AUTO: 56 %
PLATELET # BLD AUTO: 154 10E9/L (ref 150–450)
RBC # BLD AUTO: 3.25 10E12/L (ref 3.8–5.2)
T3 SERPL-MCNC: 126 NG/DL (ref 60–181)
T4 FREE SERPL-MCNC: 1.05 NG/DL (ref 0.76–1.46)
WBC # BLD AUTO: 3.4 10E9/L (ref 4–11)

## 2017-05-11 ENCOUNTER — HOSPITAL ENCOUNTER (OUTPATIENT)
Dept: RESPIRATORY THERAPY | Facility: CLINIC | Age: 82
End: 2017-05-11
Attending: INTERNAL MEDICINE
Payer: MEDICARE

## 2017-05-11 DIAGNOSIS — R06.09 DOE (DYSPNEA ON EXERTION): ICD-10-CM

## 2017-05-11 LAB
6 MIN WALK (FT): 705 FT
6 MIN WALK (M): 215 M

## 2017-05-11 PROCEDURE — 94060 EVALUATION OF WHEEZING: CPT | Mod: XU

## 2017-05-11 PROCEDURE — 94726 PLETHYSMOGRAPHY LUNG VOLUMES: CPT

## 2017-05-11 PROCEDURE — 94729 DIFFUSING CAPACITY: CPT

## 2017-05-11 PROCEDURE — 94620 ZZHC SIX MINUTE WALK TEST: CPT

## 2017-05-12 ENCOUNTER — TELEPHONE (OUTPATIENT)
Dept: FAMILY MEDICINE | Facility: CLINIC | Age: 82
End: 2017-05-12

## 2017-05-12 NOTE — TELEPHONE ENCOUNTER
Reason for Call:  Request for results:    Name of test or procedure: lungs    Date of test of procedure: yesterday    Location of the test or procedure: hospital    OK to leave the result message on voice mail or with a family member? YES    Phone number Patient can be reached at:  Home number on file [phone] (home)    Additional comments: anytime    Call taken on 5/12/2017 at 4:16 PM by Daija Chang

## 2017-05-13 NOTE — PROGRESS NOTES
"I discussed her results with her by phone  Iron stores are ok  PFT report is not final, but spirometry does not look grossly abnormal  Anemia is persistent, consider hemorrhage or bone marrow dysfunction as a cause of her anemia  Her mild intermittent dyspnea persists  I asked her to return to clinic so we can review all her diagnostic results and determine next steps   She may need pulmonary consult or a heme consult for consideration of bone marrow aspiration  Her TSH remains suppressed at times with normal thyroid hormone levels, she has a thyroid nodule and has never had a RAIUS to see if it is a \"hot nodule\" this could be considered as well, but it would not really explain her symptoms"

## 2017-05-15 LAB
DLCOCOR-%PRED-PRE: 69 %
DLCOCOR-PRE: 12.79 ML/MIN/MMHG
DLCOUNC-%PRED-PRE: 61 %
DLCOUNC-PRE: 11.33 ML/MIN/MMHG
DLCOUNC-PRED: 18.5 ML/MIN/MMHG
ERV-%PRED-PRE: 55 %
ERV-PRE: 0.18 L
ERV-PRED: 0.33 L
EXPTIME-PRE: 7.36 SEC
FEF2575-%PRED-POST: 99 %
FEF2575-%PRED-PRE: 80 %
FEF2575-POST: 1.46 L/SEC
FEF2575-PRE: 1.18 L/SEC
FEF2575-PRED: 1.47 L/SEC
FEFMAX-%PRED-PRE: 51 %
FEFMAX-PRE: 2.29 L/SEC
FEFMAX-PRED: 4.41 L/SEC
FEV1-%PRED-PRE: 86 %
FEV1-PRE: 1.54 L
FEV1FEV6-PRE: 65 %
FEV1FEV6-PRED: 77 %
FEV1FVC-PRE: 70 %
FEV1FVC-PRED: 77 %
FEV1SVC-PRE: 74 %
FEV1SVC-PRED: 69 %
FIFMAX-PRE: 2.11 L/SEC
FRCPLETH-%PRED-PRE: 108 %
FRCPLETH-PRE: 2.84 L
FRCPLETH-PRED: 2.61 L
FVC-%PRED-PRE: 93 %
FVC-PRE: 2.2 L
FVC-PRED: 2.35 L
IC-%PRED-PRE: 84 %
IC-PRE: 1.9 L
IC-PRED: 2.25 L
RVPLETH-%PRED-PRE: 124 %
RVPLETH-PRE: 2.66 L
RVPLETH-PRED: 2.14 L
TLCPLETH-%PRED-PRE: 103 %
TLCPLETH-PRE: 4.74 L
TLCPLETH-PRED: 4.6 L
VA-%PRED-PRE: 71 %
VA-PRE: 3.39 L
VC-%PRED-PRE: 80 %
VC-PRE: 2.09 L
VC-PRED: 2.58 L

## 2017-05-16 ENCOUNTER — ALLIED HEALTH/NURSE VISIT (OUTPATIENT)
Dept: CARDIOLOGY | Facility: CLINIC | Age: 82
End: 2017-05-16
Payer: MEDICARE

## 2017-05-16 DIAGNOSIS — Z95.0 CARDIAC PACEMAKER IN SITU: Primary | ICD-10-CM

## 2017-05-16 DIAGNOSIS — I49.5 SINOATRIAL NODE DYSFUNCTION (H): ICD-10-CM

## 2017-05-16 PROCEDURE — 93280 PM DEVICE PROGR EVAL DUAL: CPT | Performed by: INTERNAL MEDICINE

## 2017-05-16 NOTE — MR AVS SNAPSHOT
After Visit Summary   5/16/2017    Quiana Dunaway    MRN: [id number]           Patient Information     Date Of Birth          1935        Visit Information        Provider Department      5/16/2017 3:00 PM [facility] DCR2 [facility] HEART [facility]        Today's Diagnoses     Cardiac pacemaker in situ    -  1    Sinoatrial node dysfunction (H)           Follow-ups after your visit        Your next 10 appointments already scheduled     Jun 21, 2017  1:30 PM CDT   [facility]ung [facility]   [facility]    [address] [facility] [address] [phone]           Bring a current list of meds and any records pertaining to this visit.  For Physicals, please bring immunization records and any forms needing to be filled out.  Please arrive 10 minutes early to complete paperwork.            Aug 30, 2017  1:00 PM CDT   Remote PPM Check with RIOS TECH1   [facility] ([facility])    [address] [address] [phone]           This appointment is for a remote check of your pacemaker.  This is not an appointment at the office.              Who to contact     If you have questions or need follow up information about today's clinic visit or your schedule please contact [facility] directly at [phone].  Normal or non-critical lab and imaging results will be communicated to you by MyChart, letter or phone within 4 business days after the clinic has received the results. If you do not hear from us within 7 days, please contact the clinic through MyChart or phone. If you have a critical or abnormal lab result, we will notify you by phone as soon as possible.  Submit refill requests through [employer] or call your pharmacy and they will forward the refill request to us. Please allow 3 business days for your refill to  "be completed.          Additional Information About Your Visit        [employer] Information     [employer] lets you send messages to your doctor, view your test results, renew your prescriptions, schedule appointments and more. To sign up, go to [email or web address] . Click on \"Log in\" on the left side of the screen, which will take you to the Welcome page. Then click on \"Sign up Now\" on the right side of the page.     You will be asked to enter the access code listed below, as well as some personal information. Please follow the directions to create your username and password.     Your access code is: [email or web address]  Expires: 6/3/2017 12:04 AM     Your access code will  in 90 days. If you need help or a new code, please call your [place] clinic or [phone].        Care EveryWhere ID     This is your Care EveryWhere ID. This could be used by other organizations to access your [place] medical records  [id number]         Blood Pressure from Last 3 Encounters:   17 117/64   17 126/82   04/10/17 128/64    Weight from Last 3 Encounters:   17 73.5 kg (162 lb)   04/10/17 73.5 kg (162 lb)   17 73.9 kg (163 lb)              We Performed the Following     PM DEVICE PROGRAMMING EVAL, DUAL LEAD PACER ([id number])        Primary Care Provider Office Phone # Fax #    César Chung [phone]       [facility] [address]  [facility] [address]        Thank you!     Thank you for choosing [facility] [place]  for your care. Our goal is always to provide you with excellent care. Hearing back from our patients is one way we can continue to improve our services. Please take a few minutes to complete the written survey that you may receive in the mail after your visit with us. Thank you!             Your Updated Medication List - Protect others around you: Learn how to safely use, store and throw away your medicines at www.disposemymeds.org.    "       This list is accurate as of: 5/16/17  3:40 PM.  Always use your most recent med list.                   Brand Name Dispense Instructions for use    aspirin 81 MG chewable tablet      Take 81 mg by mouth       lisinopril 10 MG tablet    PRINIVIL/ZESTRIL    90 tablet    Take 1 tablet (10 mg) by mouth daily       nitroglycerin 0.4 MG sublingual tablet    NITROSTAT    25 tablet    For chest pain place 1 tablet under the tongue every 5 minutes for 3 doses. If symptoms persist 5 minutes after 1st dose call 911.

## 2017-05-16 NOTE — PROGRESS NOTES
[employer]  Pacemaker Device Check/ 6 week post implant  AP: 74 % : 7.3 %  Mode: DDDR        Underlying Rhythm: SR/SB  Heart Rate: Histogram is stable with adequate HR  Sensing: Stable    Pacing Threshold: Stable   Impedance: WNL  Battery Status: 9-13 years estimated longevity  Atrial Arrhythmia: NONE  Ventricular Arrhythmia: NONE  Setting Change: NONE    Care Plan: Remote in 3 months.

## 2017-06-06 ENCOUNTER — HOSPITAL ENCOUNTER (EMERGENCY)
Facility: CLINIC | Age: 82
Discharge: HOME | End: 2017-06-06
Attending: EMERGENCY MEDICINE | Admitting: EMERGENCY MEDICINE
Payer: MEDICARE

## 2017-06-06 VITALS
BODY MASS INDEX: 17.92 KG/M2 | OXYGEN SATURATION: 96 % | HEIGHT: 62 IN | SYSTOLIC BLOOD PRESSURE: 151 MMHG | HEART RATE: 89 BPM | RESPIRATION RATE: 16 BRPM | WEIGHT: 97.4 LBS | TEMPERATURE: 98.4 F | DIASTOLIC BLOOD PRESSURE: 75 MMHG

## 2017-06-06 DIAGNOSIS — R06.09 DYSPNEA ON EXERTION: ICD-10-CM

## 2017-06-06 LAB
BASOPHILS # BLD AUTO: 0 10E9/L (ref 0–0.2)
BASOPHILS NFR BLD AUTO: 0.3 %
DIFFERENTIAL METHOD BLD: ABNORMAL
EOSINOPHIL # BLD AUTO: 0 10E9/L (ref 0–0.7)
EOSINOPHIL NFR BLD AUTO: 1.1 %
ERYTHROCYTE [DISTWIDTH] IN BLOOD BY AUTOMATED COUNT: 14 % (ref 10–15)
HCT VFR BLD AUTO: 28.7 % (ref 35–47)
HGB BLD-MCNC: 9.7 G/DL (ref 11.7–15.7)
IMM GRANULOCYTES # BLD: 0 10E9/L (ref 0–0.4)
IMM GRANULOCYTES NFR BLD: 0.5 %
INTERPRETATION ECG - MUSE: NORMAL
LYMPHOCYTES # BLD AUTO: 1.7 10E9/L (ref 0.8–5.3)
LYMPHOCYTES NFR BLD AUTO: 46.2 %
MCH RBC QN AUTO: 31.8 PG (ref 26.5–33)
MCHC RBC AUTO-ENTMCNC: 33.8 G/DL (ref 31.5–36.5)
MCV RBC AUTO: 94 FL (ref 78–100)
MONOCYTES # BLD AUTO: 0.2 10E9/L (ref 0–1.3)
MONOCYTES NFR BLD AUTO: 6.3 %
NEUTROPHILS # BLD AUTO: 1.7 10E9/L (ref 1.6–8.3)
NEUTROPHILS NFR BLD AUTO: 45.6 %
NRBC # BLD AUTO: 0 10*3/UL
NRBC BLD AUTO-RTO: 0 /100
PLATELET # BLD AUTO: 170 10E9/L (ref 150–450)
RBC # BLD AUTO: 3.05 10E12/L (ref 3.8–5.2)
WBC # BLD AUTO: 3.7 10E9/L (ref 4–11)

## 2017-06-06 PROCEDURE — 99284 EMERGENCY DEPT VISIT MOD MDM: CPT

## 2017-06-06 PROCEDURE — 85025 COMPLETE CBC W/AUTO DIFF WBC: CPT | Performed by: EMERGENCY MEDICINE

## 2017-06-06 PROCEDURE — 93005 ELECTROCARDIOGRAM TRACING: CPT

## 2017-06-06 ASSESSMENT — ENCOUNTER SYMPTOMS
FEVER: 0
COUGH: 0
SHORTNESS OF BREATH: 1

## 2017-06-06 NOTE — ED AVS SNAPSHOT
Emergency Department    [address] [place] [address]    Phone:  [phone]    Fax:  [phone]                                       Quiana Dunaway   MRN: [id number]    Department:   Emergency Department   Date of Visit:  6/6/2017           After Visit Summary Signature Page     I have received my discharge instructions, and my questions have been answered. I have discussed any challenges I see with this plan with the nurse or doctor.    ..........................................................................................................................................  Patient/Patient Representative Signature      ..........................................................................................................................................  Patient Representative Print Name and Relationship to Patient    ..................................................               ................................................  Date                                            Time    ..........................................................................................................................................  Reviewed by Signature/Title    ...................................................              ..............................................  Date                                                            Time

## 2017-06-06 NOTE — ED AVS SNAPSHOT
Emergency Department    [address] [facility] [address]    Phone:  [phone]    Fax:  [phone]                                       Quiana Dunaway   MRN: [id number]    Department:   Emergency Department   Date of Visit:  6/6/2017           Patient Information     Date Of Birth          1935        Your diagnoses for this visit were:     Dyspnea on exertion        You were seen by Anmol Uriarte MD.      Follow-up Information     Follow up with César Chung MD.    Specialty:  Internal Medicine    Contact information:    [place] [id number] [place] [address]  [phone]          Discharge Instructions         Shortness of Breath (Dyspnea)  Shortness of breath is the feeling that you can't catch your breath or get enough air. It is also known as dyspnea.  Dyspnea can be caused by many different conditions. They include:    Acute asthma attack.    Worsening of chronic lung diseases such as chronic bronchitis and emphysema.     Heart failure. This is when weak heart muscle allows extra fluid to collect in the lungs.    Panic attacks or anxiety. Fear can cause rapid breathing (hyperventilation).    Pneumonia, or an infection in the lung tissue.    Exposure to toxic substances, fumes, smoke, or certain medicines.    Blood clot in the lung (pulmonary embolism). This is often from a piece of blood clot in a deep vein of the leg (deep vein thrombosis) that breaks off and travels to the lungs.     Heart attack or heart-related chest pain (angina).    Anemia.    Collapsed lung (pneumothorax).    Dehydration.    Pregnancy.  Based on your visit today, the exact cause of your shortness of breath is not certain. Your tests don t show any of the serious causes of dyspnea. You may need other tests to find out if you have a serious problem. It s important to watch for any new symptoms or symptoms that get worse. Follow up with your healthcare provider as directed.  Home  care  Follow these tips to take care of yourself at home:    When your symptoms are better, go back to your usual activities.    If you smoke, you should stop. Join a quit-smoking program or ask your healthcare provider for help.    Eat a healthy diet and get plenty of sleep.    Get regular exercise. Talk with your healthcare provider before starting to exercise, especially if you have other medical problems.    Cut down on the amount of caffeine and stimulants you consume.  Follow-up care  Follow up with your healthcare provider, or as advised.  If tests were done, you will be told if your treatment needs to be changed. You can call as directed for the results.  (Note: If an X-ray was taken, a specialist will review it. You will be notified of any new findings that may affect your care.)  Call 911 or get immediate medical care  Shortness of breath may be a sign of a serious medical problem. For example, it may be a problem with your heart or lungs. Call 911 if you have worsening shortness of breath or trouble breathing, especially with any of the symptoms below:    You are confused or it s difficult to wake you.    You faint or lose consciousness.    You have a fast heartbeat, or your heartbeat is irregular.     You are coughing up blood.    You have pain in your chest, arm, shoulder, neck, or upper back.    You break out in a sweat.  When to seek medical advice  Call your healthcare provider right away if any of these occur:    Slight shortness of breath or wheezing     Redness, pain or swelling in your leg, arm, or other body area    Swelling in both legs or ankles    Fast weight gain    Dizziness or weakness    Fever of 100.4 F (38 C) or higher, or as directed by your healthcare provider    8023-7904 [employer]. [address] [place] [address], [place] [address]. All rights reserved. This information is not intended as a substitute for professional medical care. Always follow your healthcare professional's  instructions.          Future Appointments        Provider Department Dept Phone Center    6/21/2017 1:30 PM César Chung MD [facility] [phone]     8/30/2017 1:00 PM [facility] [place] [phone] [facility] PSA CLIN      24 Hour Appointment Hotline       To make an appointment at any [facility] ([facility]), call [phone] ([phone]). If you don't have a family doctor or clinic, we will help you find one. [facility] are conveniently located to serve the needs of you and your family.             Review of your medicines      Our records show that you are taking the medicines listed below. If these are incorrect, please call your family doctor or clinic.        Dose / Directions Last dose taken    aspirin 81 MG chewable tablet   Dose:  81 mg        Take 81 mg by mouth   Refills:  0        lisinopril 10 MG tablet   Commonly known as:  PRINIVIL/ZESTRIL   Dose:  10 mg   Quantity:  90 tablet        Take 1 tablet (10 mg) by mouth daily   Refills:  1        nitroglycerin 0.4 MG sublingual tablet   Commonly known as:  NITROSTAT   Quantity:  25 tablet        For chest pain place 1 tablet under the tongue every 5 minutes for 3 doses. If symptoms persist 5 minutes after 1st dose call 181.   Refills:  0                Procedures and tests performed during your visit     CBC with platelets + differential    EKG 12 lead      Orders Needing Specimen Collection     None      Pending Results     No orders found from 6/4/2017 to 6/7/2017.            Pending Culture Results     No orders found from 6/4/2017 to 6/7/2017.            Pending Results Instructions     If you had any lab results that were not finalized at the time of your Discharge, you can call the ED Lab Result RN at [phone]. You will be contacted by this team for any positive Lab results or changes in treatment. The nurses are available 7 days a week from 10A to 6:30P.  You can leave a  message 24 hours per day and they will return your call.        Test Results From Your Hospital Stay        6/6/2017  8:22 PM      Component Results     Component Value Ref Range & Units Status    WBC 3.7 (L) 4.0 - 11.0 10e9/L Final    RBC Count 3.05 (L) 3.8 - 5.2 10e12/L Final    Hemoglobin 9.7 (L) 11.7 - 15.7 g/dL Final    Hematocrit 28.7 (L) 35.0 - 47.0 % Final    MCV 94 78 - 100 fl Final    MCH 31.8 26.5 - 33.0 pg Final    MCHC 33.8 31.5 - 36.5 g/dL Final    RDW 14.0 10.0 - 15.0 % Final    Platelet Count 170 150 - 450 10e9/L Final    Diff Method Automated Method  Final    % Neutrophils 45.6 % Final    % Lymphocytes 46.2 % Final    % Monocytes 6.3 % Final    % Eosinophils 1.1 % Final    % Basophils 0.3 % Final    % Immature Granulocytes 0.5 % Final    Nucleated RBCs 0 0 /100 Final    Absolute Neutrophil 1.7 1.6 - 8.3 10e9/L Final    Absolute Lymphocytes 1.7 0.8 - 5.3 10e9/L Final    Absolute Monocytes 0.2 0.0 - 1.3 10e9/L Final    Absolute Eosinophils 0.0 0.0 - 0.7 10e9/L Final    Absolute Basophils 0.0 0.0 - 0.2 10e9/L Final    Abs Immature Granulocytes 0.0 0 - 0.4 10e9/L Final    Absolute Nucleated RBC 0.0  Final                Clinical Quality Measure: Blood Pressure Screening     Your blood pressure was checked while you were in the emergency department today. The last reading we obtained was  BP: 151/75 . Please read the guidelines below about what these numbers mean and what you should do about them.  If your systolic blood pressure (the top number) is less than 120 and your diastolic blood pressure (the bottom number) is less than 80, then your blood pressure is normal. There is nothing more that you need to do about it.  If your systolic blood pressure (the top number) is 120-139 or your diastolic blood pressure (the bottom number) is 80-89, your blood pressure may be higher than it should be. You should have your blood pressure rechecked within a year by a primary care provider.  If your systolic blood  "pressure (the top number) is 140 or greater or your diastolic blood pressure (the bottom number) is 90 or greater, you may have high blood pressure. High blood pressure is treatable, but if left untreated over time it can put you at risk for heart attack, stroke, or kidney failure. You should have your blood pressure rechecked by a primary care provider within the next 4 weeks.  If your provider in the emergency department today gave you specific instructions to follow-up with your doctor or provider even sooner than that, you should follow that instruction and not wait for up to 4 weeks for your follow-up visit.        Thank you for choosing [facility]       Thank you for choosing [facility] for your care. Our goal is always to provide you with excellent care. Hearing back from our patients is one way we can continue to improve our services. Please take a few minutes to complete the written survey that you may receive in the mail after you visit with us. Thank you!        [employer] Information     [employer] lets you send messages to your doctor, view your test results, renew your prescriptions, schedule appointments and more. To sign up, go to [email or web address] . Click on \"Log in\" on the left side of the screen, which will take you to the Welcome page. Then click on \"Sign up Now\" on the right side of the page.     You will be asked to enter the access code listed below, as well as some personal information. Please follow the directions to create your username and password.     Your access code is: [email or web address]  Expires: 2017  8:16 PM     Your access code will  in 90 days. If you need help or a new code, please call your [facility] clinic or [phone].        Care EveryWhere ID     This is your Care EveryWhere ID. This could be used by other organizations to access your [facility] medical records  [id number]        After Visit Summary       This is your record. Keep this with you and show to your community " pharmacist(s) and doctor(s) at your next visit.

## 2017-06-07 NOTE — DISCHARGE INSTRUCTIONS
Shortness of Breath (Dyspnea)  Shortness of breath is the feeling that you can't catch your breath or get enough air. It is also known as dyspnea.  Dyspnea can be caused by many different conditions. They include:    Acute asthma attack.    Worsening of chronic lung diseases such as chronic bronchitis and emphysema.     Heart failure. This is when weak heart muscle allows extra fluid to collect in the lungs.    Panic attacks or anxiety. Fear can cause rapid breathing (hyperventilation).    Pneumonia, or an infection in the lung tissue.    Exposure to toxic substances, fumes, smoke, or certain medicines.    Blood clot in the lung (pulmonary embolism). This is often from a piece of blood clot in a deep vein of the leg (deep vein thrombosis) that breaks off and travels to the lungs.     Heart attack or heart-related chest pain (angina).    Anemia.    Collapsed lung (pneumothorax).    Dehydration.    Pregnancy.  Based on your visit today, the exact cause of your shortness of breath is not certain. Your tests don t show any of the serious causes of dyspnea. You may need other tests to find out if you have a serious problem. It s important to watch for any new symptoms or symptoms that get worse. Follow up with your healthcare provider as directed.  Home care  Follow these tips to take care of yourself at home:    When your symptoms are better, go back to your usual activities.    If you smoke, you should stop. Join a quit-smoking program or ask your healthcare provider for help.    Eat a healthy diet and get plenty of sleep.    Get regular exercise. Talk with your healthcare provider before starting to exercise, especially if you have other medical problems.    Cut down on the amount of caffeine and stimulants you consume.  Follow-up care  Follow up with your healthcare provider, or as advised.  If tests were done, you will be told if your treatment needs to be changed. You can call as directed for the  results.  (Note: If an X-ray was taken, a specialist will review it. You will be notified of any new findings that may affect your care.)  Call 911 or get immediate medical care  Shortness of breath may be a sign of a serious medical problem. For example, it may be a problem with your heart or lungs. Call 911 if you have worsening shortness of breath or trouble breathing, especially with any of the symptoms below:    You are confused or it s difficult to wake you.    You faint or lose consciousness.    You have a fast heartbeat, or your heartbeat is irregular.     You are coughing up blood.    You have pain in your chest, arm, shoulder, neck, or upper back.    You break out in a sweat.  When to seek medical advice  Call your healthcare provider right away if any of these occur:    Slight shortness of breath or wheezing     Redness, pain or swelling in your leg, arm, or other body area    Swelling in both legs or ankles    Fast weight gain    Dizziness or weakness    Fever of 100.4 F (38 C) or higher, or as directed by your healthcare provider    1044-7810 [employer]. [address] [place] [address]. All rights reserved. This information is not intended as a substitute for professional medical care. Always follow your healthcare professional's instructions.

## 2017-06-07 NOTE — ED PROVIDER NOTES
"  History     Chief Complaint:  Shortness of Breath       The history is provided by the patient and a relative.      Quiana Dunaway is a 81 year old female with history of HTN s/p pacemaker implant who presents with shortness of breath.  Patient has been experiencing shortness of breath for the last several months.  She has undergone multiple evaluations and had a pacemaker implanted though continues to experience symptoms.  This was worse today to the point she was completely out of breath walking up the 4 steps in her house prompting visit to the emergency department.  She denies fevers, cough, chest pain, or other complaint.     Allergies:  No known drug allergies      Medications:    Nitroglycerin  Lisinopril  Aspirin 81 mg     Past Medical History:    HTN  Dyspnea on Exertion    Past Surgical History:    Pacemaker implantation    Family History:    History reviewed. No pertinent family history.      Social History:  Presents with daughter   Tobacco use: Never  Alcohol use: Negative  PCP: César Chung    Marital Status:       Review of Systems   Constitutional: Negative for fever.   Respiratory: Positive for shortness of breath. Negative for cough.    Cardiovascular: Negative for chest pain.   All other systems reviewed and are negative.      Physical Exam     Patient Vitals for the past 24 hrs:   BP Temp Temp src Pulse Resp SpO2 Height Weight   06/06/17 1930 151/75 98.4  F (36.9  C) Oral 89 16 96 % 1.575 m (5' 2\") 44.2 kg (97 lb 6.4 oz)        Physical Exam  Constitutional: The patient is oriented to person, place, and time.   HENT:   Head: Atraumatic  Right Ear: Normal  Left Ear: Normal  Nose: Nose normal.   Mouth/Throat: Oropharynx is clear and moist. No erythema or exudate.   Eyes: Conjunctivae and EOM are normal. Pupils are equal, round, and reactive to light. No discharge  Neck: Normal range of motion. Neck supple.   Cardiovascular: Normal rate, regular rhythm, no murmur gallops or rubs. " Intact distal pulses.    Pulmonary/Chest: CTA bilaterally. No wheezes rale or rhonchi.  Abdominal: Soft. Non tender.  No masses   Musculoskeletal: No edema. No bony deformity. Normal range of motion. Cardiac device in left chest.   Lymphadenopathy:     The patient has no cervical adenopathy.   Neurological: The patient is alert and oriented to person, place, and time. The patient has normal strength and normal reflexes. No cranial nerve deficit. Coordination normal.   Skin: Skin is warm and dry. No rash noted. The patient is not diaphoretic.   Psychiatric: The patient has a normal mood and affect.       Emergency Department Course   ECG (19:37:33):  Rate 82 bpm. OR interval 272. QRS duration 146. QT/QTc 432/504. P-R-T axes 50 -51 80.  Sinus rhythm with 1st degree AV block.  Left axis deviation.  LBBB.  Abnormal ECG. No significant change when compared to prior.  Interpreted at 1946 by Anmlo Uriarte MD.     Laboratory:  CBC: WBC 3.7 (L), HGB 9.7 (L) ow WNL ()     Emergency Department Course:  Past medical records, nursing notes, and vitals reviewed.  1946: I performed an exam of the patient and obtained history as documented above.   Above workup undertaken.  I personally reviewed the laboratory results with the Patient and daughter and answered all related questions prior to discharge.    2028: I rechecked the patient.  Findings and plan explained to the Patient and daughter. Patient discharged home with instructions regarding supportive care, medications, and reasons to return. The importance of close follow-up was reviewed.      Impression & Plan    Medical Decision Making:  Quiana Dunaway is a 81 year old female presenting with an episode of dyspnea on exertion.  She notes she has been having ongoing problems with this over the last 6 months and has had extensive workup through her primary physician including CT scan of the chest for pulmonary embolus, nuclear stress testing, and pulmonary function  testing without clear etiology.  She does have chronic anemia and hemoglobin today is 9.7.  EKG shows a paced rhythm.  Patient is not hypoxic and at this point I do not see any other acute process that requires any further workup.  This is discussed with patient and daughter and they were in agreement.  I feel she can be safely discharged to home to follow up with primary physician.  She has no signs of cardiac ischemia, infection, or acute pulmonary process.     Diagnosis:    ICD-10-CM   1. Dyspnea on exertion R06.09       Disposition:  Discharged to home with plan as outlined.        Willem Riggs  6/6/2017    EMERGENCY DEPARTMENT    IWillem, am serving as a scribe at 7:46 PM on 6/6/2017 to document services personally performed by Anmol Uriarte MD based on my observations and the provider's statements to me.        Anmol Uriarte MD  06/06/17 [id number]

## 2017-06-07 NOTE — ED NOTES
Pt has been feeling SOB for a long time ( approx 6 months).  Today feeling worse.  Had pacemaker placed about 3 months ago to see if that helped which it has not helped.

## 2017-06-20 DIAGNOSIS — F41.1 GAD (GENERALIZED ANXIETY DISORDER): Primary | ICD-10-CM

## 2017-06-20 RX ORDER — ESCITALOPRAM OXALATE 5 MG/1
TABLET ORAL
Qty: 90 TABLET | Refills: 0 | Status: SHIPPED | OUTPATIENT
Start: 2017-06-20 | End: 2017-09-18

## 2017-06-20 NOTE — TELEPHONE ENCOUNTER
escitalopram (LEXAPRO) 5 MG tablet  Last Written Prescription Date:  ?  Last Fill Quantity: ?,   # refills: ?  Last Office Visit with [facility], [facility] or [facility] prescribing provider: 5/1/2017  Future Office visit:    Next 5 appointments (look out 90 days)     Jun 21, 2017  1:30 PM CDT   Office Visit with C[facility]ar Chung MD   [facility]    [address] [facility] [address]   [phone]                   Routing refill request to provider for review/approval because:  Drug not active on patient's medication list

## 2017-06-21 ENCOUNTER — OFFICE VISIT (OUTPATIENT)
Dept: FAMILY MEDICINE | Facility: CLINIC | Age: 82
End: 2017-06-21
Payer: MEDICARE

## 2017-06-21 VITALS
WEIGHT: 164 LBS | SYSTOLIC BLOOD PRESSURE: 112 MMHG | BODY MASS INDEX: 30.18 KG/M2 | TEMPERATURE: 99.3 F | HEART RATE: 69 BPM | HEIGHT: 62 IN | OXYGEN SATURATION: 95 % | DIASTOLIC BLOOD PRESSURE: 61 MMHG

## 2017-06-21 DIAGNOSIS — D61.818 PANCYTOPENIA (H): ICD-10-CM

## 2017-06-21 DIAGNOSIS — D64.9 ANEMIA, UNSPECIFIED TYPE: Primary | ICD-10-CM

## 2017-06-21 DIAGNOSIS — E53.8 VITAMIN B12 DEFICIENCY (NON ANEMIC): ICD-10-CM

## 2017-06-21 DIAGNOSIS — E05.90 HYPERTHYROIDISM: ICD-10-CM

## 2017-06-21 LAB
ERYTHROCYTE [DISTWIDTH] IN BLOOD BY AUTOMATED COUNT: 13.8 % (ref 10–15)
HCT VFR BLD AUTO: 29.4 % (ref 35–47)
HGB BLD-MCNC: 9.6 G/DL (ref 11.7–15.7)
MCH RBC QN AUTO: 31.5 PG (ref 26.5–33)
MCHC RBC AUTO-ENTMCNC: 32.7 G/DL (ref 31.5–36.5)
MCV RBC AUTO: 96 FL (ref 78–100)
PLATELET # BLD AUTO: 149 10E9/L (ref 150–450)
RBC # BLD AUTO: 3.05 10E12/L (ref 3.8–5.2)
WBC # BLD AUTO: 3.3 10E9/L (ref 4–11)

## 2017-06-21 PROCEDURE — 36415 COLL VENOUS BLD VENIPUNCTURE: CPT | Performed by: INTERNAL MEDICINE

## 2017-06-21 PROCEDURE — 85027 COMPLETE CBC AUTOMATED: CPT | Performed by: INTERNAL MEDICINE

## 2017-06-21 PROCEDURE — 99214 OFFICE O/P EST MOD 30 MIN: CPT | Performed by: INTERNAL MEDICINE

## 2017-06-21 PROCEDURE — 84443 ASSAY THYROID STIM HORMONE: CPT | Performed by: INTERNAL MEDICINE

## 2017-06-21 PROCEDURE — 84439 ASSAY OF FREE THYROXINE: CPT | Performed by: INTERNAL MEDICINE

## 2017-06-21 NOTE — LETTER
[address]  [address]  [place]  [address]  Tel: [phone]    June 26, 2017    Quiana ANA Emelyn  [address] [place] [id number]        Dear Ms. Dunaway,    The following letter pertains to your most recent diagnostic tests:     The anemia is essentially stable from last check.  However it remains moderate to severe and needs to be further evaluated with consideration for a bone marrow biopsy.  Please meet with Dr. Parry to discuss this.  The thyroid tests are essentially unchanged, and your thyroid is not likely the cause of your symptoms.     If you have any further questions or problems, please contact our office.      Sincerely,    César Chung MD/IGNACIO          Enclosure: Lab Results  Results for orders placed or performed in visit on 06/21/17   CBC with platelets   Result Value Ref Range    WBC 3.3 (L) 4.0 - 11.0 10e9/L    RBC Count 3.05 (L) 3.8 - 5.2 10e12/L    Hemoglobin 9.6 (L) 11.7 - 15.7 g/dL    Hematocrit 29.4 (L) 35.0 - 47.0 %    MCV 96 78 - 100 fl    MCH 31.5 26.5 - 33.0 pg    MCHC 32.7 31.5 - 36.5 g/dL    RDW 13.8 10.0 - 15.0 %    Platelet Count 149 (L) 150 - 450 10e9/L   TSH with free T4 reflex   Result Value Ref Range    TSH 0.07 (L) 0.40 - 4.00 mU/L   T4 free   Result Value Ref Range    T4 Free 1.17 0.76 - 1.46 ng/dL

## 2017-06-21 NOTE — PROGRESS NOTES
SUBJECTIVE:                                                    Quiana Dunaway is a 81 year old female who presents to clinic today for the following health issues:  Chief Complaint   Patient presents with     Hospital F/U     Follow Up For     Pulmonary testing results       ED/UC Followup:    Facility:   ER  Date of visit: 06/06/2017  Reason for visit: Dyspnea on exertion   Current Status: feeling some improvement with pacemaker, but still having some SOB with exertion and stairs.       This is an 81-year-old female with a history of mild hypertension, mild anxiety, history of thyroid nodules and a mildly suppressed thyroid-stimulating hormone. 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 She sometimes works 12 hour days. She has been feeling short of breath with activity for several months now. She has undergone an extensive evaluation including echocardiogram, CT scan of the chest, pulmonary function testing.  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 However, the pacemaker did not completely resolve her dyspnea on exertion symptoms. She has also been noted to have a new anemia. Her white blood cell count has been trending low as well. Her anemia evaluation did not reveal iron deficiency as the cause. Her vitamin B12 level was on the low end of the normal range, but her MCV was normal. She did have a colonoscopy to evaluate her anemia which showed no blood loss source. She has a family history of colon cancer. She has not had upper gastrointestinal endoscopy yet.    Problem list and histories reviewed & adjusted, as indicated.  Additional history: as documented    Patient Active Problem List   Diagnosis     Benign essential hypertension     No past surgical history on file.    Social History   Substance Use Topics     Smoking status: Never Smoker     Smokeless tobacco: Never Used     Alcohol use No     Family  "History   Problem Relation Age of Onset     Unknown/Adopted Mother      Unknown/Adopted Father          Current Outpatient Prescriptions   Medication Sig Dispense Refill     escitalopram (LEXAPRO) 5 MG tablet TAKE 1 TABLET BY MOUTH DAILY 90 tablet 0     nitroglycerin (NITROSTAT) 0.4 MG sublingual tablet For chest pain place 1 tablet under the tongue every 5 minutes for 3 doses. If symptoms persist 5 minutes after 1st dose call 911. 25 tablet 0     lisinopril (PRINIVIL/ZESTRIL) 10 MG tablet Take 1 tablet (10 mg) by mouth daily 90 tablet 1     aspirin 81 MG chewable tablet Take 81 mg by mouth       Allergies   Allergen Reactions     No Known Allergies        Reviewed and updated as needed this visit by clinical staff       Reviewed and updated as needed this visit by Provider          ROS:  Constitutional, HEENT, cardiovascular, pulmonary, gi and gu systems are negative, except as otherwise noted.    OBJECTIVE:                                                    /61 (BP Location: Left arm, Cuff Size: Adult Regular)  Pulse 69  Temp 99.3  F (37.4  C) (Tympanic)  Ht 5' 2\" (1.575 m)  Wt 164 lb (74.4 kg)  SpO2 95%  Breastfeeding? No  BMI 30 kg/m2  Body mass index is 30 kg/(m^2).  Gen.: This is a well-appearing elderly female in no acute distress. She speaks in full sentences and does not appear toxic.    Diagnostic Test Results:  Results for orders placed or performed in visit on 06/21/17   CBC with platelets   Result Value Ref Range    WBC 3.3 (L) 4.0 - 11.0 10e9/L    RBC Count 3.05 (L) 3.8 - 5.2 10e12/L    Hemoglobin 9.6 (L) 11.7 - 15.7 g/dL    Hematocrit 29.4 (L) 35.0 - 47.0 %    MCV 96 78 - 100 fl    MCH 31.5 26.5 - 33.0 pg    MCHC 32.7 31.5 - 36.5 g/dL    RDW 13.8 10.0 - 15.0 %    Platelet Count 149 (L) 150 - 450 10e9/L        ASSESSMENT/PLAN:                                                              ICD-10-CM    1. Anemia, unspecified type D64.9 CBC with platelets     ONC/HEME ADULT REFERRAL   2. " Pancytopenia (H) D61.818 ONC/HEME ADULT REFERRAL   3. Vitamin B12 deficiency (non anemic) E53.8    4. Hyperthyroidism E05.90 TSH with free T4 reflex     This is a pleasant 81-year-old female with a history of persistent dyspnea on exertion without an obvious identifiable cause noted except for progressive anemia which seems to be worsening over time that is associated with a mild pancytopenia. This raises concern for bone marrow dysfunction. She is interested in trying vitamin B12 supplements to see if this can improve her anemia. However, I would not rely on this alone to evaluate for the cause of her anemia, I think she should see a hematologist for consideration of a bone marrow biopsy. I asked her to contact Dr. Parry's office to schedule an appointment for in consultation to see if she is a candidate for bone marrow biopsy to further evaluate her anemia. In the meantime, she will start a vitamin B12 supplement. Her thyroid disease might be contributing to her symptoms, but dangerous problems in the bone marrow should be excluded prior to further evaluating her abnormal thyroid function tests.      FUTURE APPOINTMENTS:       - She will see Dr. Parry from hematology as soon as possible. She will follow-up with me in about 4-6 weeks. Perhaps will have bone marrow biopsy results by then? I will recheck her hemoglobin today to establish a trend also check thyroid function tests again.    César Chung MD  [facility]

## 2017-06-21 NOTE — NURSING NOTE
"Chief Complaint   Patient presents with     Hospital F/U     Follow Up For     Pulmonary testing results       Initial /61 (BP Location: Left arm, Cuff Size: Adult Regular)  Pulse 69  Temp 99.3  F (37.4  C) (Tympanic)  Ht 5' 2\" (1.575 m)  Wt 164 lb (74.4 kg)  SpO2 95%  Breastfeeding? No  BMI 30 kg/m2 Estimated body mass index is 30 kg/(m^2) as calculated from the following:    Height as of this encounter: 5' 2\" (1.575 m).    Weight as of this encounter: 164 lb (74.4 kg).  Medication Reconciliation: complete   Bonny Zhang MA      "

## 2017-06-21 NOTE — MR AVS SNAPSHOT
"              After Visit Summary   6/21/2017    Quiana Dunaway    MRN: [id number]           Patient Information     Date Of Birth          1935        Visit Information        Provider Department      6/21/2017 1:30 PM César Chung MD [facility]        Today's Diagnoses     Anemia, unspecified type    -  1    Pancytopenia (H)        Vitamin B12 deficiency (non anemic)        Hyperthyroidism           Follow-ups after your visit        Additional Services     ONC/HEME ADULT REFERRAL       Your provider has referred you to: [facility]: [facility] [phone]   [email or web address]    Dr. Parry (\"BK\")    Please be aware that coverage of these services is subject to the terms and limitations of your health insurance plan.  Call member services at your health plan with any benefit or coverage questions.      Please bring the following with you to your appointment:    (1) Any X-Rays, CTs or MRIs which have been performed.  Contact the facility where they were done to arrange for  prior to your scheduled appointment.   (2) List of current medications  (3) This referral request   (4) Any documents/labs given to you for this referral                  Follow-up notes from your care team     Return in about 4 weeks (around 7/19/2017) for Routine Visit.      Your next 10 appointments already scheduled     Aug 30, 2017  1:00 PM CDT   Remote PPM Check with RIOS TECH1   [facility] [place] ([facility])    [address] [place] [id number] [phone]           This appointment is for a remote check of your pacemaker.  This is not an appointment at the office.              Who to contact     If you have questions or need follow up information about today's clinic visit or your schedule " "please contact [facility] directly at [phone].  Normal or non-critical lab and imaging results will be communicated to you by MyChart, letter or phone within 4 business days after the clinic has received the results. If you do not hear from us within 7 days, please contact the clinic through TrustYouhart or phone. If you have a critical or abnormal lab result, we will notify you by phone as soon as possible.  Submit refill requests through [email or web address] or call your pharmacy and they will forward the refill request to us. Please allow 3 business days for your refill to be completed.          Additional Information About Your Visit        TrustYouharShareWithU Information     [email or web address] lets you send messages to your doctor, view your test results, renew your prescriptions, schedule appointments and more. To sign up, go to [email or web address] . Click on \"Log in\" on the left side of the screen, which will take you to the Welcome page. Then click on \"Sign up Now\" on the right side of the page.     You will be asked to enter the access code listed below, as well as some personal information. Please follow the directions to create your username and password.     Your access code is: [email or web address]  Expires: 2017  8:16 PM     Your access code will  in 90 days. If you need help or a new code, please call your [place] clinic or [phone].        Care EveryWhere ID     This is your Care EveryWhere ID. This could be used by other organizations to access your [place] medical records  [id number]        Your Vitals Were     Pulse Temperature Height Pulse Oximetry Breastfeeding? BMI (Body Mass Index)    69 99.3  F (37.4  C) (Tympanic) 5' 2\" (1.575 m) 95% No 30 kg/m2       Blood Pressure from Last 3 Encounters:   17 112/61   17 151/75   17 117/64    Weight from Last 3 Encounters:   17 164 lb (74.4 kg)   17 97 lb 6.4 oz (44.2 kg)   17 162 lb (73.5 kg)              We Performed the " Following     CBC with platelets     ONC/HEME ADULT REFERRAL     TSH with free T4 reflex        Primary Care Provider Office Phone # Fax #    César Chung [phone]       [facility] [address]  [facility] [address]        Equal Access to Services     LILLIANA SANDOVAL : Hadii aad ku hadasho Soomaali, waaxda luqadaha, qaybta kaalmada adeegyada, waxay karenin hayaan adedenzel hector jennifer villeda. So [facility] [phone].    ATENCIÓN: Si habla español, tiene a contreras disposición servicios gratuitos de asistencia lingüística. Llame al [phone].    We comply with applicable federal civil rights laws and [place] laws. We do not discriminate on the basis of race, color, national origin, age, disability sex, sexual orientation or gender identity.            Thank you!     Thank you for choosing [facility]  for your care. Our goal is always to provide you with excellent care. Hearing back from our patients is one way we can continue to improve our services. Please take a few minutes to complete the written survey that you may receive in the mail after your visit with us. Thank you!             Your Updated Medication List - Protect others around you: Learn how to safely use, store and throw away your medicines at www.disposemymeds.org.          This list is accurate as of: 6/21/17  2:16 PM.  Always use your most recent med list.                   Brand Name Dispense Instructions for use Diagnosis    aspirin 81 MG chewable tablet      Take 81 mg by mouth        escitalopram 5 MG tablet    LEXAPRO    90 tablet    TAKE 1 TABLET BY MOUTH DAILY    ROBER (generalized anxiety disorder)       lisinopril 10 MG tablet    PRINIVIL/ZESTRIL    90 tablet    Take 1 tablet (10 mg) by mouth daily    Benign essential hypertension       nitroglycerin 0.4 MG sublingual tablet    NITROSTAT    25 tablet    For chest pain place 1 tablet under the tongue every 5 minutes for 3 doses. If symptoms persist 5 minutes after  1st dose call 911.    Atypical chest pain

## 2017-06-22 LAB
T4 FREE SERPL-MCNC: 1.17 NG/DL (ref 0.76–1.46)
TSH SERPL DL<=0.005 MIU/L-ACNC: 0.07 MU/L (ref 0.4–4)

## 2017-06-25 NOTE — PROGRESS NOTES
The following letter pertains to your most recent diagnostic tests:    The anemia is essentially stable from last check.  However it remains moderate to severe and needs to be further evaluated with consideration for a bone marrow biopsy.  Please meet with Dr. Parry to discuss this.  The thyroid tests are essentially unchanged, and your thyroid is not likely the cause of your symptoms.      Sincerely,    Dr. Chung

## 2017-06-27 ENCOUNTER — CARE COORDINATION (OUTPATIENT)
Dept: ONCOLOGY | Facility: CLINIC | Age: 82
End: 2017-06-27

## 2017-06-27 NOTE — PROGRESS NOTES
I have attempted to contact patient for a appointment reminder and answer [place] general questions the patient may have. I was unable to leave a message.  All records are in [facility]. Will follow as needed for care coordination. Alecia Briggs

## 2017-06-28 ENCOUNTER — ONCOLOGY VISIT (OUTPATIENT)
Dept: ONCOLOGY | Facility: CLINIC | Age: 82
End: 2017-06-28
Attending: INTERNAL MEDICINE
Payer: MEDICARE

## 2017-06-28 VITALS
SYSTOLIC BLOOD PRESSURE: 124 MMHG | OXYGEN SATURATION: 95 % | DIASTOLIC BLOOD PRESSURE: 66 MMHG | WEIGHT: 165 LBS | TEMPERATURE: 98.8 F | HEART RATE: 79 BPM | BODY MASS INDEX: 30.18 KG/M2 | RESPIRATION RATE: 16 BRPM

## 2017-06-28 DIAGNOSIS — D61.818 PANCYTOPENIA (H): Primary | ICD-10-CM

## 2017-06-28 PROCEDURE — 99203 OFFICE O/P NEW LOW 30 MIN: CPT | Performed by: INTERNAL MEDICINE

## 2017-06-28 PROCEDURE — 99211 OFF/OP EST MAY X REQ PHY/QHP: CPT

## 2017-06-28 ASSESSMENT — PAIN SCALES - GENERAL: PAINLEVEL: NO PAIN (0)

## 2017-06-28 NOTE — PROGRESS NOTES
"Oncology Rooming Note    June 28, 2017 1:39 PM   Quiana Dunaway is a 81 year old female who presents for:    Chief Complaint   Patient presents with     Oncology Clinic Visit     Anemia     Initial Vitals: /66 (BP Location: Left arm, Patient Position: Chair, Cuff Size: Adult Regular)  Pulse 79  Temp 98.8  F (37.1  C) (Oral)  Resp 16  Wt 74.8 kg (165 lb)  SpO2 95%  BMI 30.18 kg/m2 Estimated body mass index is 30.18 kg/(m^2) as calculated from the following:    Height as of 6/21/17: 1.575 m (5' 2\").    Weight as of this encounter: 74.8 kg (165 lb). Body surface area is 1.81 meters squared.  No Pain (0) Comment: Data Unavailable   No LMP recorded. Patient is postmenopausal.  Allergies reviewed: Yes  Medications reviewed: Yes    Medications: Medication refills not needed today.  Pharmacy name entered into [employer]: [facility] [address] [place] [address] [address] [place] [facility] [address]    Clinical concerns: None                5 minutes for nursing intake (face to face time)     Isabel Sarmiento MA        DISCHARGE PLAN:    Bone marrow biopsy under local.  Information sheet given to her and reviewed.  MD follow up after bone marrow    Next appointments: See patient instruction section  Departure Mode: Ambulatory  Accompanied by: daughter  10 minutes for nursing discharge (face to face time)   Madelyn Mackey RN    6-30-17 Received note stating that [facility] lab had called that Pt's serum protein electrophoresis order was ordered on 6-28-17 but no lab was drawn.  \"Labs today\" was not listed on Pt's  Pt Discharge Instructions.  Called Pt & she will come back 7-6-17 at 3pm to have this lab drawn.  Isabel Yang RN  "

## 2017-06-28 NOTE — MR AVS SNAPSHOT
After Visit Summary   6/28/2017    Quiana Dunaway    MRN: [id number]           Patient Information     Date Of Birth          1935        Visit Information        Provider Department      6/28/2017 1:30 PM Alex Parry MD [facility]        Today's Diagnoses     Pancytopenia (H)    -  1      Care Instructions    Bone marrow biopsy under local. Unilateral. Send for cytogenetics.  Follow up after bone marrow.          Follow-ups after your visit        Your next 10 appointments already scheduled     Jul 05, 2017   Procedure with Grace Vela MD   [facility] ([facility])    [address]  [facility] [address]   [phone]           [facility] is located at [address] [place]            Jul 11, 2017  1:30 PM CDT   Return Visit with Alex Parry MD   [facility] ([facility])    [facility]  [address]  [facility] [address] [phone]            Aug 03, 2017  1:30 PM CDT   Office Visit with César Chung MD   [facility]    [address] [facility] [address] [phone]           Bring a current list of meds and any records pertaining to this visit.  For Physicals, please bring immunization records and any forms needing to be filled out.  Please arrive 10 minutes early to complete paperwork.            Aug 30, 2017  1:00 PM CDT   Remote PPM Check with RISO TECH1   [facility] PHYSICIANS [facility] [place] ([facility])    [address]  [facility] [address] [phone]           This appointment is for a remote check of your pacemaker.  This is not an appointment at the office.              Who to contact     If you have questions or need follow up information about today's clinic visit or your schedule please contact [facility] directly at  "[phone].  Normal or non-critical lab and imaging results will be communicated to you by MyChart, letter or phone within 4 business days after the clinic has received the results. If you do not hear from us within 7 days, please contact the clinic through [employer] or phone. If you have a critical or abnormal lab result, we will notify you by phone as soon as possible.  Submit refill requests through [employer] or call your pharmacy and they will forward the refill request to us. Please allow 3 business days for your refill to be completed.          Additional Information About Your Visit        [employer] Information     [employer] lets you send messages to your doctor, view your test results, renew your prescriptions, schedule appointments and more. To sign up, go to [email or web address] . Click on \"Log in\" on the left side of the screen, which will take you to the Welcome page. Then click on \"Sign up Now\" on the right side of the page.     You will be asked to enter the access code listed below, as well as some personal information. Please follow the directions to create your username and password.     Your access code is: [email or web address]  Expires: 2017  8:16 PM     Your access code will  in 90 days. If you need help or a new code, please call your [place] clinic or [phone].        Care EveryWhere ID     This is your Care EveryWhere ID. This could be used by other organizations to access your [place] medical records  [id number]        Your Vitals Were     Pulse Temperature Respirations Pulse Oximetry BMI (Body Mass Index)       79 98.8  F (37.1  C) (Oral) 16 95% 30.18 kg/m2        Blood Pressure from Last 3 Encounters:   17 124/66   17 112/61   17 151/75    Weight from Last 3 Encounters:   17 74.8 kg (165 lb)   17 74.4 kg (164 lb)   17 44.2 kg (97 lb 6.4 oz)              We Performed the Following     Bone marrow biopsy     Protein electrophoresis        Primary " Care Provider Office Phone # Fax #    César Chung [phone]       [facility] [address] [place] [address]        Equal Access to Services     LILLIANA SANDOVAL : Hadii aad ku hadivelisseo Soomaali, waaxda luqadaha, qaybta kaalmada adeegyada, phoenix hortonn carlos garaydiana villeda. So [facility] [phone].    ATENCIÓN: Si habla español, tiene a contreras disposición servicios gratuitos de asistencia lingüística. Llame al [phone].    We comply with applicable federal civil rights laws and [place] laws. We do not discriminate on the basis of race, color, national origin, age, disability sex, sexual orientation or gender identity.            Thank you!     Thank you for choosing [facility]  for your care. Our goal is always to provide you with excellent care. Hearing back from our patients is one way we can continue to improve our services. Please take a few minutes to complete the written survey that you may receive in the mail after your visit with us. Thank you!             Your Updated Medication List - Protect others around you: Learn how to safely use, store and throw away your medicines at www.disposemymeds.org.          This list is accurate as of: 6/28/17 11:59 PM.  Always use your most recent med list.                   Brand Name Dispense Instructions for use Diagnosis    aspirin 81 MG chewable tablet      Take 81 mg by mouth        escitalopram 5 MG tablet    LEXAPRO    90 tablet    TAKE 1 TABLET BY MOUTH DAILY    ROBER (generalized anxiety disorder)       lisinopril 10 MG tablet    PRINIVIL/ZESTRIL    90 tablet    Take 1 tablet (10 mg) by mouth daily    Benign essential hypertension       nitroGLYcerin 0.4 MG sublingual tablet    NITROSTAT    25 tablet    For chest pain place 1 tablet under the tongue every 5 minutes for 3 doses. If symptoms persist 5 minutes after 1st dose call 911.    Atypical chest pain

## 2017-06-28 NOTE — PROGRESS NOTES
This consult has been requested by Dr. Chung for pancytopenia.    Mrs. Dunaway is a 81-year-old female who has not been feeling well for last one year.  In last one year, her fatigue has been progressively getting worse.  Patient used to walk 3 miles a day before.  Now she can only walk short distances and gets tired.  She also gets short of breath on exertion, especially going up the steps.  No associated chest pain.  She has been evaluated by cardiologist.  She had a pacemaker placed for type II heart block.  Her symptoms have not improved.  Recent labs have revealed pancytopenia.  Hematology has been consulted for that.  Her labs are reviewed and summarized below.  1.  On 03/09/2013, WBC 4.8, hemoglobin of 12.6 and platelet of 2:30.  2.  On 07/08/2016, WBC of 4.2, hemoglobin of 11 and platelet of 171.  3.  On 03/04/2017, WBC of 3.2, hemoglobin of 10.4 and platelet of 167.  MCV of 94.  4.  Multiple labs were done in 05/01/2017:  -WBC of 3.4, hemoglobin of 10.2 and platelet of 154.  MCV of 96.  Neutrophil of 56%, lymphocyte of 41% and monocyte of 3%.  Reticulocyte of 1.7%.  -. Blood smear review by pathologist did not reveal any abnormal cells.  Mild normochromic normocytic anemia and mild leukopenia.  -CMP is normal.  -Vitamin B12 normal at 377.  -Folate is normal at 19.4  -Iron of 66 and ferritin of 98.  -TSH of 0.22  -Haptoglobin of 130.  5.  On 06/21/2017, WBC of 3.3, hemoglobin 9.6 and platelet 149.  6.  CT chest angiogram on 03/07/2017 did not reveal pulmonary embolism.  There is left thyroid nodule measuring 2.6 cm.  There is a 1.2 cm cystic appearing lesion in pancreatic tail.  It could be IPMN.  No splenomegaly.  7.  Colonoscopy on 04/28/2017 revealed colon polyps. Pathology revealed tubular adenoma.  No high-grade dysplasia.    Review of systems:  Patient's main problem is fatigue.  This has been getting worse in last one year.  Her other problem is shortness of breath on exertion.  No shortness of  breath at rest.    Denies any headache.  No dizziness.  No visual problem.  No sore throat.  No problem swallowing food.  No chest pain.  No shortness of breath.  No nausea.  No vomiting.  Appetite has been good.  No recent weight loss.  No urinary complaints.  No bowel problem.  No bleeding from any site.  No blood in the urine or stool.  No black stool.  No fever, chills or night sweats.    All other review of systems negative.    Allergies: Reviewed.    Medications: Reviewed.    Past medical history:  -Hypertension  -Second-degree heart block, type II, status post pacemaker placement.  -Cataract surgery.    Social history:  -No history of smoking.  -No alcohol use    Family history:  -She had one brother who  of some kind of cancer in his 60s.  -She had one sister who  of metastatic colon cancer at the age of 43.    Exam:  Alert and oriented ×3.  Eyes: No icterus.  No subconjunctival hemorrhage.  Throat: No ulcer.  No mass.  Neck: No lymphadenopathy.  Nontender.  Axilla: No lymphadenopathy.  Lungs: Good air entry bilaterally.  No crackles or wheezing.  Heart: Regular.  Abdomen: Soft.  Nondistended.  Nontender.  No masses felt.  Extremities: No edema.  Skin: No petechiae.    Labs: Reviewed.    Assessment:   1.  81-year-old female with pancytopenia.  2.  Shortness of breath on exertion.  3.  Fatigue.  4.  Hypertension.  5.  Cystic lesion in pancreatic tail.?  IPMN.    Recommendation:  1.  I had long discussion with the patient.  She was accompanied by her daughter.  I reviewed the result of the blood tests.  Explained to her that she has pancytopenia.  WBC, hemoglobin and platelet are all low.  In last one year, pancytopenia has gotten worse.    Different causes of pancytopenia discussed.  It can be seen with vitamin B12 deficiency, folate deficiency, medications, myelodysplastic syndrome, myelofibrosis, myeloma, leukemia, lymphoma, splenomegaly and other causes.  Vitamin B12 and folate deficiency has been  ruled out.  There is no splenomegaly seen on CT scan.  Nothing to suggest any GI bleeding.    Given her age, we need to rule out myelodysplastic syndrome.  Explained to her that myelodysplastic syndrome gets more common with aging.  Only way to diagnose it is with bone marrow biopsy.  Procedure of bone marrow biopsy was discussed.  She is agreeable for rate.  We'll arrange for bone marrow biopsy.  Sample will be sent for cytogenetics.  We will also get a serum protein electrophoresis.    2.  Complications of pancytopenia discussed.  Leukopenia can cause increased risk of infection.  Thrombocytopenia can cause bleeding or easy bruising.  Anemia can cause worsening fatigue and shortness of breath.  Her main problem is fatigue and shortness of breath on exertion from anemia.  I'm hoping that bone marrow biopsy will give a clue to the cause of pancytopenia.    3.  Patient and daughter had multiple questions which were all answered.  I will see her back after bone marrow biopsy.  Further recommendations to follow after that.    Thanks for the consult.

## 2017-07-03 ENCOUNTER — TELEPHONE (OUTPATIENT)
Dept: ONCOLOGY | Facility: CLINIC | Age: 82
End: 2017-07-03

## 2017-07-03 NOTE — TELEPHONE ENCOUNTER
Pt's daughter-in-law called on Pt's behalf stating that Pt had an episode of feeling faint & weak walking in to a friend's house & had to lay down for awhile.    Pt's energy level has decreased since Friday.    Pt's BP: 154/79 p-77 at home today.      Pt denies blood in her stool.    Pt only drank 2 cans of diet  today.  Strongly encouraged Pt to drink 8 full glasses of water per day.    Pt & her family are wondering if Pt can fly to [place] tomorrow for a relative's wedding.    Spoke with :  Pt needs to go in to an Urgent Care (or ER) to be evaluated to see if she safe to travel especially with having been feeling faint.    Notified Pt's daughter-in-law & she will make sure Pt is seen today.

## 2017-07-07 ENCOUNTER — HOSPITAL ENCOUNTER (OUTPATIENT)
Facility: CLINIC | Age: 82
Setting detail: SPECIMEN
Discharge: HOME | End: 2017-07-07
Attending: INTERNAL MEDICINE | Admitting: INTERNAL MEDICINE
Payer: MEDICARE

## 2017-07-07 ENCOUNTER — ONCOLOGY VISIT (OUTPATIENT)
Dept: ONCOLOGY | Facility: CLINIC | Age: 82
End: 2017-07-07
Attending: INTERNAL MEDICINE
Payer: MEDICARE

## 2017-07-07 DIAGNOSIS — D61.818 PANCYTOPENIA (H): ICD-10-CM

## 2017-07-07 PROCEDURE — 00000402 ZZHCL STATISTIC TOTAL PROTEIN: Performed by: INTERNAL MEDICINE

## 2017-07-07 PROCEDURE — 36415 COLL VENOUS BLD VENIPUNCTURE: CPT

## 2017-07-07 PROCEDURE — 84165 PROTEIN E-PHORESIS SERUM: CPT | Performed by: INTERNAL MEDICINE

## 2017-07-07 NOTE — PROGRESS NOTES
Medical Assistant Note:  Quiana Dunaway presents today for lab visit.    Patient seen by provider today: No.   present during visit today: Not Applicable.    Concerns: No Concerns.    Procedure:  Lab draw site: RAC, Needle type: BF, Gauge: 21 g gauze and coban applied.    Post Assessment:  Labs drawn without difficulty: Yes.    Discharge Plan:  Departure Mode: Ambulatory.    Face to Face Time: 5.    Isabel Sarmiento MA

## 2017-07-07 NOTE — MR AVS SNAPSHOT
After Visit Summary   7/7/2017    Quiana Dunaway    MRN: [id number]           Patient Information     Date Of Birth          1935        Visit Information        Provider Department      7/7/2017 1:30 PM Nurse,  Oncology [facility]        Today's Diagnoses     Pancytopenia (H)           Follow-ups after your visit        Your next 10 appointments already scheduled     Jul 12, 2017   Procedure with Grace Vela MD   [facility] ([facility])    [address]  [facility] [address] [phone]           [facility] is located at [address] [place]            Jul 18, 2017  1:30 PM CDT   Return Visit with Alex Parry MD   [facility] ([facility])    [facility]  [address]  [facility] [address] [phone]            Aug 03, 2017  1:30 PM CDT   Office Visit with César Chugn MD   [facility]    [address] [facility] [address] [phone]           Bring a current list of meds and any records pertaining to this visit.  For Physicals, please bring immunization records and any forms needing to be filled out.  Please arrive 10 minutes early to complete paperwork.            Aug 30, 2017  1:00 PM CDT   Remote PPM Check with RIOS TECH1   [facility] AT [place] ([facility])    [address]  [facility] [address] [phone]           This appointment is for a remote check of your pacemaker.  This is not an appointment at the office.              Who to contact     If you have questions or need follow up information about today's clinic visit or your schedule please contact [facility] directly at [phone].  Normal or non-critical lab and imaging results will be communicated to you by MyChart, letter or phone within 4 business  "days after the clinic has received the results. If you do not hear from us within 7 days, please contact the clinic through [employer] or phone. If you have a critical or abnormal lab result, we will notify you by phone as soon as possible.  Submit refill requests through [employer] or call your pharmacy and they will forward the refill request to us. Please allow 3 business days for your refill to be completed.          Additional Information About Your Visit        [employer] Information     [employer] lets you send messages to your doctor, view your test results, renew your prescriptions, schedule appointments and more. To sign up, go to [email or web address] . Click on \"Log in\" on the left side of the screen, which will take you to the Welcome page. Then click on \"Sign up Now\" on the right side of the page.     You will be asked to enter the access code listed below, as well as some personal information. Please follow the directions to create your username and password.     Your access code is: [email or web address]  Expires: 2017  8:16 PM     Your access code will  in 90 days. If you need help or a new code, please call your [place] clinic or [phone].        Care EveryWhere ID     This is your Care EveryWhere ID. This could be used by other organizations to access your [place] medical records  [id number]         Blood Pressure from Last 3 Encounters:   17 124/66   17 112/61   17 151/75    Weight from Last 3 Encounters:   17 74.8 kg (165 lb)   17 74.4 kg (164 lb)   17 44.2 kg (97 lb 6.4 oz)              We Performed the Following     Protein electrophoresis        Primary Care Provider Office Phone # Fax #    César Chung [phone]       [facility] [address] [place] [address]        Equal Access to Services     LILLIANA SANDOVAL AH: Hadii aad ku hadasho Soomaali, waaxda luqadaha, qaybta kaalmabertha pineda, pho[place] young " lasultana villeda. So [facility] [phone].    ATENCIÓN: Si habla azam, tiene a contreras disposición servicios gratuitos de asistencia lingüística. Chad al [phone].    We comply with applicable federal civil rights laws and [place] laws. We do not discriminate on the basis of race, color, national origin, age, disability sex, sexual orientation or gender identity.            Thank you!     Thank you for choosing [facility]  for your care. Our goal is always to provide you with excellent care. Hearing back from our patients is one way we can continue to improve our services. Please take a few minutes to complete the written survey that you may receive in the mail after your visit with us. Thank you!             Your Updated Medication List - Protect others around you: Learn how to safely use, store and throw away your medicines at www.disposemymeds.org.          This list is accurate as of: 7/7/17  1:48 PM.  Always use your most recent med list.                   Brand Name Dispense Instructions for use Diagnosis    aspirin 81 MG chewable tablet      Take 81 mg by mouth        escitalopram 5 MG tablet    LEXAPRO    90 tablet    TAKE 1 TABLET BY MOUTH DAILY    ROBER (generalized anxiety disorder)       lisinopril 10 MG tablet    PRINIVIL/ZESTRIL    90 tablet    Take 1 tablet (10 mg) by mouth daily    Benign essential hypertension       nitroGLYcerin 0.4 MG sublingual tablet    NITROSTAT    25 tablet    For chest pain place 1 tablet under the tongue every 5 minutes for 3 doses. If symptoms persist 5 minutes after 1st dose call 911.    Atypical chest pain

## 2017-07-10 LAB
ALBUMIN SERPL ELPH-MCNC: 4.2 G/DL (ref 3.7–5.1)
ALPHA1 GLOB SERPL ELPH-MCNC: 0.3 G/DL (ref 0.2–0.4)
ALPHA2 GLOB SERPL ELPH-MCNC: 0.7 G/DL (ref 0.5–0.9)
B-GLOBULIN SERPL ELPH-MCNC: 0.7 G/DL (ref 0.6–1)
GAMMA GLOB SERPL ELPH-MCNC: 2 G/DL (ref 0.7–1.6)
M PROTEIN SERPL ELPH-MCNC: 1.8 G/DL
PROT PATTERN SERPL ELPH-IMP: ABNORMAL

## 2017-07-11 ENCOUNTER — TELEPHONE (OUTPATIENT)
Dept: ONCOLOGY | Facility: CLINIC | Age: 82
End: 2017-07-11

## 2017-07-11 NOTE — TELEPHONE ENCOUNTER
Spoke with Endo dept, they can not use Dr. Parry's dictation from 6/28/17 as an H& P Prior to BM BX with conscious sedation tomorrow.  Instead of re-scheduling the patient is willing to have the biopsy with Local.  She is to check in at 915am for a 10am procedure.

## 2017-07-12 ENCOUNTER — HOSPITAL ENCOUNTER (OUTPATIENT)
Facility: CLINIC | Age: 82
Setting detail: SPECIMEN
End: 2017-07-12
Attending: INTERNAL MEDICINE
Payer: MEDICARE

## 2017-07-12 ENCOUNTER — HOSPITAL ENCOUNTER (OUTPATIENT)
Facility: CLINIC | Age: 82
Discharge: HOME | End: 2017-07-12
Attending: PATHOLOGY | Admitting: PATHOLOGY
Payer: MEDICARE

## 2017-07-12 ENCOUNTER — HOSPITAL ENCOUNTER (OUTPATIENT)
Facility: CLINIC | Age: 82
Setting detail: SPECIMEN
Discharge: HOME | End: 2017-07-12
Attending: INTERNAL MEDICINE | Admitting: INTERNAL MEDICINE
Payer: MEDICARE

## 2017-07-12 VITALS
SYSTOLIC BLOOD PRESSURE: 155 MMHG | OXYGEN SATURATION: 95 % | RESPIRATION RATE: 16 BRPM | DIASTOLIC BLOOD PRESSURE: 75 MMHG

## 2017-07-12 LAB
BASOPHILS # BLD AUTO: 0 10E9/L (ref 0–0.2)
BASOPHILS NFR BLD AUTO: 0.6 %
DIFFERENTIAL METHOD BLD: ABNORMAL
EOSINOPHIL # BLD AUTO: 0 10E9/L (ref 0–0.7)
EOSINOPHIL NFR BLD AUTO: 0.9 %
ERYTHROCYTE [DISTWIDTH] IN BLOOD BY AUTOMATED COUNT: 13.7 % (ref 10–15)
HCT VFR BLD AUTO: 30 % (ref 35–47)
HGB BLD-MCNC: 10.2 G/DL (ref 11.7–15.7)
IMM GRANULOCYTES # BLD: 0 10E9/L (ref 0–0.4)
IMM GRANULOCYTES NFR BLD: 0 %
LYMPHOCYTES # BLD AUTO: 1.4 10E9/L (ref 0.8–5.3)
LYMPHOCYTES NFR BLD AUTO: 43.8 %
MCH RBC QN AUTO: 31.4 PG (ref 26.5–33)
MCHC RBC AUTO-ENTMCNC: 34 G/DL (ref 31.5–36.5)
MCV RBC AUTO: 92 FL (ref 78–100)
MONOCYTES # BLD AUTO: 0.2 10E9/L (ref 0–1.3)
MONOCYTES NFR BLD AUTO: 7.2 %
NEUTROPHILS # BLD AUTO: 1.5 10E9/L (ref 1.6–8.3)
NEUTROPHILS NFR BLD AUTO: 47.5 %
NRBC # BLD AUTO: 0 10*3/UL
NRBC BLD AUTO-RTO: 0 /100
PLATELET # BLD AUTO: 153 10E9/L (ref 150–450)
RBC # BLD AUTO: 3.25 10E12/L (ref 3.8–5.2)
RETICS # AUTO: 65.3 10E9/L (ref 25–95)
RETICS/RBC NFR AUTO: 2 % (ref 0.5–2)
WBC # BLD AUTO: 3.2 10E9/L (ref 4–11)

## 2017-07-12 PROCEDURE — 88311 DECALCIFY TISSUE: CPT | Performed by: INTERNAL MEDICINE

## 2017-07-12 PROCEDURE — 88280 CHROMOSOME KARYOTYPE STUDY: CPT | Performed by: INTERNAL MEDICINE

## 2017-07-12 PROCEDURE — 88182 CELL MARKER STUDY: CPT | Performed by: INTERNAL MEDICINE

## 2017-07-12 PROCEDURE — 88271 CYTOGENETICS DNA PROBE: CPT | Performed by: INTERNAL MEDICINE

## 2017-07-12 PROCEDURE — 85060 BLOOD SMEAR INTERPRETATION: CPT | Performed by: INTERNAL MEDICINE

## 2017-07-12 PROCEDURE — 88313 SPECIAL STAINS GROUP 2: CPT | Mod: 26 | Performed by: INTERNAL MEDICINE

## 2017-07-12 PROCEDURE — 88305 TISSUE EXAM BY PATHOLOGIST: CPT | Performed by: INTERNAL MEDICINE

## 2017-07-12 PROCEDURE — 85097 BONE MARROW INTERPRETATION: CPT | Performed by: INTERNAL MEDICINE

## 2017-07-12 PROCEDURE — 00000159 ZZHCL STATISTIC H-SEND OUTS PREP: Performed by: INTERNAL MEDICINE

## 2017-07-12 PROCEDURE — 88313 SPECIAL STAINS GROUP 2: CPT | Performed by: INTERNAL MEDICINE

## 2017-07-12 PROCEDURE — 40000847 ZZHCL STATISTIC MORPHOLOGY W/INTERP HISTOLOGY TC 85060: Performed by: INTERNAL MEDICINE

## 2017-07-12 PROCEDURE — 88311 DECALCIFY TISSUE: CPT | Mod: 26 | Performed by: INTERNAL MEDICINE

## 2017-07-12 PROCEDURE — 88275 CYTOGENETICS 100-300: CPT | Performed by: INTERNAL MEDICINE

## 2017-07-12 PROCEDURE — 88305 TISSUE EXAM BY PATHOLOGIST: CPT | Mod: 26 | Performed by: INTERNAL MEDICINE

## 2017-07-12 PROCEDURE — 38221 DX BONE MARROW BIOPSIES: CPT | Performed by: INTERNAL MEDICINE

## 2017-07-12 PROCEDURE — G0364 BONE MARROW ASPIRATE &BIOPSY: HCPCS | Performed by: INTERNAL MEDICINE

## 2017-07-12 PROCEDURE — 00000058 ZZHCL STATISTIC BONE MARROW ASP PERF TC 38220: Performed by: INTERNAL MEDICINE

## 2017-07-12 PROCEDURE — 40000424 ZZHCL STATISTIC BONE MARROW CORE PERF TC 38221: Performed by: INTERNAL MEDICINE

## 2017-07-12 PROCEDURE — 40000948 ZZHCL STATISTIC BONE MARROW ASP TC 85097: Performed by: INTERNAL MEDICINE

## 2017-07-12 PROCEDURE — 88237 TISSUE CULTURE BONE MARROW: CPT | Performed by: INTERNAL MEDICINE

## 2017-07-12 PROCEDURE — 88264 CHROMOSOME ANALYSIS 20-25: CPT | Performed by: INTERNAL MEDICINE

## 2017-07-12 RX ORDER — ONDANSETRON 2 MG/ML
INJECTION INTRAMUSCULAR; INTRAVENOUS PRN
Status: DISCONTINUED | OUTPATIENT
Start: 2017-07-12 | End: 2017-07-12 | Stop reason: HOSPADM

## 2017-07-12 RX ADMIN — HYDROMORPHONE HYDROCHLORIDE 0.5 MG: 1 INJECTION, SOLUTION INTRAMUSCULAR; INTRAVENOUS; SUBCUTANEOUS at 10:29

## 2017-07-12 NOTE — OR NURSING
ENDO  Pt states she is feeling a little better but wants to go home and rest  There.  Discharge instructions reviewed with daughter.  Dressing dry, intact.  Pt discharged myla wheelchair

## 2017-07-12 NOTE — PROCEDURES
The patient was positively identified and informed consent was obtained (see the completed Affirmation of Consent for Bone Marrow Aspiration and/or Biopsy Procedure(s) form in the patient's chart). The patient was placed in the prone position and the bony landmarks of the pelvis were identified. Medical staff reconfirmed the patient's name, date of birth and procedure. The skin over the posterior iliac crest was scrubbed and draped in a sterile fashion. The local area of the procedure was anesthetized with a total of 15 mL of 1% Lidocaine and a small incision was made.  The patient did not receive conscious sedation, but was premedicated with IV diluadid.    Trephine bone marrow core(s) was/were obtained from the left posterior iliac crest. Bone marrow aspirate was obtained from the left posterior iliac crest for: morphology with possible immunophenotyping and/or cytogenetics and molecular diagnostics    Direct pressure was applied to the biopsy site with sterile gauze. The biopsy site was cleaned with alcohol and a sterile dressing was placed over the biopsy incision using a pressure bandage. The patient was then placed in the supine position to maintain pressure on the biopsy site. Post-procedure wound care instructions, including routine dressing instructions and analgesia, were given to the patient. The procedure was completed without complication.

## 2017-07-13 LAB — COPATH REPORT: NORMAL

## 2017-07-14 LAB — COPATH REPORT: NORMAL

## 2017-07-17 ENCOUNTER — CARE COORDINATION (OUTPATIENT)
Dept: ONCOLOGY | Facility: CLINIC | Age: 82
End: 2017-07-17

## 2017-07-17 DIAGNOSIS — D61.818 PANCYTOPENIA (H): Primary | ICD-10-CM

## 2017-07-17 LAB — COPATH REPORT: NORMAL

## 2017-07-17 NOTE — PROGRESS NOTES
Per Dr. Parry an order for pathology consult placed to check FISH panel.  Order has been faxed to [facility] pathology [phone].Alecia Briggs

## 2017-07-18 ENCOUNTER — TELEPHONE (OUTPATIENT)
Dept: ONCOLOGY | Facility: CLINIC | Age: 82
End: 2017-07-18

## 2017-07-18 ENCOUNTER — ONCOLOGY VISIT (OUTPATIENT)
Dept: ONCOLOGY | Facility: CLINIC | Age: 82
End: 2017-07-18
Attending: INTERNAL MEDICINE
Payer: MEDICARE

## 2017-07-18 ENCOUNTER — HOSPITAL ENCOUNTER (OUTPATIENT)
Facility: CLINIC | Age: 82
Setting detail: SPECIMEN
Discharge: HOME | End: 2017-07-18
Attending: INTERNAL MEDICINE | Admitting: INTERNAL MEDICINE
Payer: MEDICARE

## 2017-07-18 VITALS
TEMPERATURE: 98.3 F | HEART RATE: 81 BPM | WEIGHT: 163.8 LBS | DIASTOLIC BLOOD PRESSURE: 66 MMHG | SYSTOLIC BLOOD PRESSURE: 156 MMHG | OXYGEN SATURATION: 95 % | BODY MASS INDEX: 29.96 KG/M2 | RESPIRATION RATE: 16 BRPM

## 2017-07-18 DIAGNOSIS — C90.00 MULTIPLE MYELOMA NOT HAVING ACHIEVED REMISSION (H): Primary | ICD-10-CM

## 2017-07-18 DIAGNOSIS — R06.02 SOB (SHORTNESS OF BREATH): ICD-10-CM

## 2017-07-18 LAB
LDH SERPL L TO P-CCNC: 188 U/L (ref 81–234)
URATE SERPL-MCNC: 5.8 MG/DL (ref 2.6–6)

## 2017-07-18 PROCEDURE — 99215 OFFICE O/P EST HI 40 MIN: CPT | Performed by: INTERNAL MEDICINE

## 2017-07-18 PROCEDURE — 82784 ASSAY IGA/IGD/IGG/IGM EACH: CPT | Performed by: INTERNAL MEDICINE

## 2017-07-18 PROCEDURE — 83883 ASSAY NEPHELOMETRY NOT SPEC: CPT | Performed by: INTERNAL MEDICINE

## 2017-07-18 PROCEDURE — 82232 ASSAY OF BETA-2 PROTEIN: CPT | Performed by: INTERNAL MEDICINE

## 2017-07-18 PROCEDURE — 84165 PROTEIN E-PHORESIS SERUM: CPT | Performed by: INTERNAL MEDICINE

## 2017-07-18 PROCEDURE — 99211 OFF/OP EST MAY X REQ PHY/QHP: CPT

## 2017-07-18 PROCEDURE — 86334 IMMUNOFIX E-PHORESIS SERUM: CPT | Performed by: INTERNAL MEDICINE

## 2017-07-18 PROCEDURE — 84550 ASSAY OF BLOOD/URIC ACID: CPT | Performed by: INTERNAL MEDICINE

## 2017-07-18 PROCEDURE — 00000402 ZZHCL STATISTIC TOTAL PROTEIN: Performed by: INTERNAL MEDICINE

## 2017-07-18 PROCEDURE — 36415 COLL VENOUS BLD VENIPUNCTURE: CPT

## 2017-07-18 PROCEDURE — 83615 LACTATE (LD) (LDH) ENZYME: CPT | Performed by: INTERNAL MEDICINE

## 2017-07-18 ASSESSMENT — PAIN SCALES - GENERAL: PAINLEVEL: NO PAIN (0)

## 2017-07-18 NOTE — MR AVS SNAPSHOT
After Visit Summary   7/18/2017    Quiana Dunaway    MRN: [id number]           Patient Information     Date Of Birth          1935        Visit Information        Provider Department      7/18/2017 1:30 PM Alex Parry MD [facility]        Today's Diagnoses     Multiple myeloma not having achieved remission (H)    -  1    SOB (shortness of breath)          Care Instructions    Labs. - Drawn by RIVKA Hall  24-hours urine electrophoresis and immunofixation.- Instructions and supplies given to the patient. LW  PET scan.- Instruction sheet given to the patient- LW  ECHO.  Dental clearance for zometa.- Dental clearance form given to the patient- LW  Follow up after PET scan.          Follow-ups after your visit        Your next 10 appointments already scheduled     Jul 24, 2017  8:30 AM CDT   PE NPET ONCOLOGY (EYES TO THIGHS) with [id number]   [facility] PET CT ([facility])    [address] [place] [id number] [phone]           Tell your doctor:   If there is any chance you may be pregnant or if you are breastfeeding.   If you have problems lying in small spaces (claustrophobia). If you do, your doctor may give you medicine to help you relax. If you have diabetes:   Have your exam early in the morning. Your blood glucose will go up as the day goes by.   Your glucose level must be 180 or less at the start of the exam. Please take any medicines you need to ensure this blood glucose level. 24 hours before your scan: Don t do any heavy exercise. (No jogging, aerobics or other workouts.) Exercise will make your pictures less accurate. 6 hours before your scan:   Stop all food and liquids (except water).   Do not chew gum or suck on mints.   If you need to take medicine with food, you may take it with a few crackers.  Please call your Imaging Department at your exam site with any questions.            Jul 25, 2017  2:00 PM CDT   Ech Complete with  [id number]   [facility] CV Echocardiography (Cardiovascular Imaging at [facility])    [address]  [facility] [id number] [phone]           1.  Please bring or wear a comfortable two-piece outfit. 2.  You may eat, drink and take your normal medicines. 3.  For any questions that cannot be answered, please contact the ordering physician            Aug 02, 2017  2:00 PM CDT   Return Visit with Alex Parry MD   [facility] ([facility])    [facility]  [address]  [facility] [id number] [phone]            Aug 03, 2017  1:30 PM CDT   Office Visit with César Chung MD   [facility] ([facility])    [address] [id number] [phone]           Bring a current list of meds and any records pertaining to this visit.  For Physicals, please bring immunization records and any forms needing to be filled out.  Please arrive 10 minutes early to complete paperwork.            Aug 30, 2017  1:00 PM CDT   Remote PPM Check with RIOS TECH1   [facility] PHYSICIANS HEART AT [place] ([facility])    [address]  [facility] [id number] [phone]           This appointment is for a remote check of your pacemaker.  This is not an appointment at the office.              Future tests that were ordered for you today     Open Future Orders        Priority Expected Expires Ordered    Echocardiogram Routine  7/18/2018 7/18/2017    PET Oncology (Eyes to Thighs) Routine  7/19/2018 7/18/2017            Who to contact     If you have questions or need follow up information about today's clinic visit or your schedule please contact [facility] directly at [phone].  Normal or non-critical lab and imaging results will be communicated to you by MyChart, letter or phone within 4 business days after the clinic has received the results. If  "you do not hear from us within 7 days, please contact the clinic through CUPS or phone. If you have a critical or abnormal lab result, we will notify you by phone as soon as possible.  Submit refill requests through CUPS or call your pharmacy and they will forward the refill request to us. Please allow 3 business days for your refill to be completed.          Additional Information About Your Visit        [employer] Information     CUPS lets you send messages to your doctor, view your test results, renew your prescriptions, schedule appointments and more. To sign up, go to [email or web address] . Click on \"Log in\" on the left side of the screen, which will take you to the Welcome page. Then click on \"Sign up Now\" on the right side of the page.     You will be asked to enter the access code listed below, as well as some personal information. Please follow the directions to create your username and password.     Your access code is: [email or web address]  Expires: 2017  8:16 PM     Your access code will  in 90 days. If you need help or a new code, please call your [place] clinic or [phone].        Care EveryWhere ID     This is your Care EveryWhere ID. This could be used by other organizations to access your [place] medical records  [id number]        Your Vitals Were     Pulse Temperature Respirations Pulse Oximetry BMI (Body Mass Index)       81 98.3  F (36.8  C) (Oral) 16 95% 29.96 kg/m2        Blood Pressure from Last 3 Encounters:   17 156/66   17 155/75   17 124/66    Weight from Last 3 Encounters:   17 74.3 kg (163 lb 12.8 oz)   17 74.8 kg (165 lb)   17 74.4 kg (164 lb)              We Performed the Following     Beta 2 microglobulin     Kappa and lambda light chain     Lactate Dehydrogenase     Protein electrophoresis timed urine     Protein electrophoresis     Protein Immunofixation Serum     Protein immunofixation urine     Uric acid        Primary Care " Provider Office Phone # Fax #    César Chung [phone]       [facility] [address] [place] [address]        Equal Access to Services     LILLIANA SANDOVAL : Hadii aad ku hadivelisseo Soomaali, waaxda luqadaha, qaybta kaalmada adeegyada, [place] hortonn carlos garaydiana villeda. So [facility] [phone].    ATENCIÓN: Si habla español, tiene a contreras disposición servicios gratuitos de asistencia lingüística. Llame al [phone].    We comply with applicable federal civil rights laws and [place] laws. We do not discriminate on the basis of race, color, national origin, age, disability sex, sexual orientation or gender identity.            Thank you!     Thank you for choosing [facility]  for your care. Our goal is always to provide you with excellent care. Hearing back from our patients is one way we can continue to improve our services. Please take a few minutes to complete the written survey that you may receive in the mail after your visit with us. Thank you!             Your Updated Medication List - Protect others around you: Learn how to safely use, store and throw away your medicines at www.disposemymeds.org.          This list is accurate as of: 7/18/17  2:33 PM.  Always use your most recent med list.                   Brand Name Dispense Instructions for use Diagnosis    aspirin 81 MG chewable tablet      Take 81 mg by mouth        escitalopram 5 MG tablet    LEXAPRO    90 tablet    TAKE 1 TABLET BY MOUTH DAILY    ROBER (generalized anxiety disorder)       lisinopril 10 MG tablet    PRINIVIL/ZESTRIL    90 tablet    Take 1 tablet (10 mg) by mouth daily    Benign essential hypertension       nitroGLYcerin 0.4 MG sublingual tablet    NITROSTAT    25 tablet    For chest pain place 1 tablet under the tongue every 5 minutes for 3 doses. If symptoms persist 5 minutes after 1st dose call 911.    Atypical chest pain

## 2017-07-18 NOTE — PROGRESS NOTES
HEMATOLOGY HISTORY: Mrs. Dunaway is a lady with multiple myeloma.  1.  Multiple labs were done on 05/01/2017:  -WBC of 3.4, hemoglobin of 10.2 and platelet of 154.  MCV of 96.  Neutrophil of 56%, lymphocyte of 41% and monocyte of 3%.  Reticulocyte of 1.7%.  -. Blood smear review by pathologist did not reveal any abnormal cells.  Mild normochromic normocytic anemia and mild leukopenia.  -CMP is normal.  -Vitamin B12 normal at 377.  -Folate is normal at 19.4  -Iron of 66 and ferritin of 98.  -TSH of 0.22  -Haptoglobin of 130.  2.  CT chest angiogram on 03/07/2017 did not reveal pulmonary embolism.  There is left thyroid nodule measuring 2.6 cm.  There is a 1.2 cm cystic appearing lesion in pancreatic tail.  It could be IPMN.  No splenomegaly.  3.  Colonoscopy on 04/28/2017 revealed colon polyps. Pathology revealed tubular adenoma.  No high-grade dysplasia.  4.  SPEP on 07/07/2017 reveals M-spike of 1.8.  5. Bone marrow biopsy was done on 07/12/2017.  It reveals hypercellular bone marrow with kappa monotypic plasma cell population consistent with multiple myeloma.  Bone marrow is 70% cellular.  Plasma cell is 50-60%.  Cytogenetics is pending.    Subjective:  Mrs. Dunaway is a 81-year-old female who was recently seen for pancytopenia.  For further investigation, bone marrow biopsy was ordered.    Bone marrow biopsy was done on 07/12/2017.  It reveals hypercellular bone marrow with kappa monotypic plasma cell population consistent with multiple myeloma.  Bone marrow is 70% cellular.  Plasma cell is 50-60%.  Cytogenetics is pending.    Patient's main problem is worsening fatigue.  She also gets short of breath walking about a block.  These symptoms have gotten worse in last few months.    No headache.  No dizziness.  No chest pain.  No shortness of breath at rest.  No nausea.  No vomiting.  Appetite is good.  No bone pain.  No bleeding from any site.    Assessment:   1.  81-year-old female with newly diagnosed multiple  myeloma.    2.  Shortness of breath on exertion.  3.  Fatigue.  4.  Hypertension.  5.  Cystic lesion in pancreatic tail.?  IPMN.  6.  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    Plan:  1.  I had long discussion with the patient and her family.  Reviewed the result of bone marrow biopsy.  It is consistent with multiple myeloma.  Cytogenetics and FISH is pending.    Discussed regarding multiple myeloma.  Explained to the patient that it is cancer of plasma cells.  Natural history of disease explained.    Myeloma is causing her pancytopenia.  At this time she doesn't have any evidence of kidney damage or lytic bone lesion.    2.  Discuss regarding further investigation.  We'll get a PET scan to evaluate the skeletal system.  We'll also get some labs and 24-hour urine.    3.  Discuss regarding treatment.  Treatment is palliative.  There is no curative option.  There are multiple different options available.  Patient is 81-year-old.  She is not a transplant candidate.  Depending on cytogenetics and FISH, we will decide regarding treatment.  It could be combination of Revlimid with dexamethasone or Revlimid and dexamethasone with Velcade or Revlimid and dexamethasone with Cytoxan.    4.  Patient has shortness of breath.  We'll get an echocardiogram.    5.  Patient will be getting bisphosphonate for myeloma.  We will get a dental clearance before bisphosphonate is started.    6.  Patient and family had multiple questions which were all answered.  I'll see her back after the PET scan.  Patient would like to start treatment in mid August because of upcoming wedding of her granddaughter.    Total time was spent 40 minutes, most of time was spent counseling.

## 2017-07-18 NOTE — TELEPHONE ENCOUNTER
Without having her BM bx results back yet, it is difficult for me to determine if she is truly eligible. It looks as if she would be base on the the information I have.     Study requires the following 28 days prior to randomization.     Labs: CMP, LDH, beta-2 microglobulin, CRP inflammation, UPEP, serum free light chains, immunofixation of serum and urine, IGG's.     She would also need an EKG (which looks like it has been ordered but not completed?) and bone survey.       Thank you for your participation in research! Let me know how else I can assist and if you would like to proceed with getting this patient on trial.     Lauren Aase, RN [facility] Research Ph. [phone] Pgr. [phone]    ANGIE Joshi

## 2017-07-18 NOTE — PROGRESS NOTES
"Oncology Rooming Note    July 18, 2017 1:27 PM   Quiana Dunaway is a 81 year old female who presents for:    Chief Complaint   Patient presents with     Oncology Clinic Visit     Pancytopenia      Initial Vitals: /66 (BP Location: Left arm, Patient Position: Chair, Cuff Size: Adult Regular)  Pulse 81  Temp 98.3  F (36.8  C) (Oral)  Resp 16  Wt 74.3 kg (163 lb 12.8 oz)  SpO2 95%  BMI 29.96 kg/m2 Estimated body mass index is 29.96 kg/(m^2) as calculated from the following:    Height as of 6/21/17: 1.575 m (5' 2\").    Weight as of this encounter: 74.3 kg (163 lb 12.8 oz). Body surface area is 1.8 meters squared.  No Pain (0) Comment: Data Unavailable   No LMP recorded. Patient is postmenopausal.  Allergies reviewed: Yes  Medications reviewed: Yes    Medications: Medication refills not needed today.  Pharmacy name entered into [email or web address]: [facility] [address] [place] [address] [address] AT [facility] [address] & [address]    Clinical concerns: one             5 minutes for nursing intake (face to face time)     Isabel Sarmiento MA    Medical Assistant Note:  Quiana Dunaway presents today for lab visit.    Patient seen by provider today: Yes: Dr. Parry.   present during visit today: Not Applicable.    Concerns: No Concerns.    Procedure:  Lab draw site: LAC, Needle type: BF, Gauge: 21 g gauze and coban applied.    Post Assessment:  Labs drawn without difficulty: Yes.    Discharge Plan:  Departure Mode: Ambulatory.    Face to Face Time: 5.    Isabel Sarmiento MA    DISCHARGE PLAN:  Next appointments: See patient instruction section.  Supplies and instructions given to patient for 24 hour urine sample as well as information on the PET scan.  A dental clearance form was given to the patient for her Dentist to fill out and send back to us.  Labs drawn in exam room today.  The patient was brought to the lobby for scheduling of future appointments.  Departure Mode: Ambulatory  Accompanied by: daughter  10 " minutes for nursing discharge (face to face time)   Isabel Sarmiento MA

## 2017-07-18 NOTE — PATIENT INSTRUCTIONS
Labs. - Drawn by RIVKA Hall  24-hours urine electrophoresis and immunofixation.- Instructions and supplies given to the patient. LW  PET scan.- Instruction sheet given to the patient- LW Scheduled/Marjan  ECHO.  Scheduled/Marjan  Dental clearance for zometa.- Dental clearance form given to the patient- LW  Follow up after PET scan.  Scheduled/Marjan      AVS printed & given to patient/Marjan

## 2017-07-19 LAB
ALBUMIN SERPL ELPH-MCNC: 4.3 G/DL (ref 3.7–5.1)
ALPHA1 GLOB SERPL ELPH-MCNC: 0.3 G/DL (ref 0.2–0.4)
ALPHA2 GLOB SERPL ELPH-MCNC: 0.8 G/DL (ref 0.5–0.9)
B-GLOBULIN SERPL ELPH-MCNC: 0.7 G/DL (ref 0.6–1)
B2 MICROGLOB SERPL-MCNC: 3.4 MG/L
COPATH REPORT: NORMAL
GAMMA GLOB SERPL ELPH-MCNC: 2.1 G/DL (ref 0.7–1.6)
IGA SERPL-MCNC: 15 MG/DL (ref 70–380)
IGG SERPL-MCNC: 2970 MG/DL (ref 695–1620)
IGM SERPL-MCNC: 175 MG/DL (ref 60–265)
IMMUNOFIXATION ELP: ABNORMAL
KAPPA LC UR-MCNC: 67.75 MG/DL (ref 0.33–1.94)
KAPPA LC/LAMBDA SER: 47.71 {RATIO} (ref 0.26–1.65)
LAMBDA LC SERPL-MCNC: 1.42 MG/DL (ref 0.57–2.63)
M PROTEIN SERPL ELPH-MCNC: 1.9 G/DL
PROT PATTERN SERPL ELPH-IMP: ABNORMAL

## 2017-07-21 LAB — COPATH REPORT: NORMAL

## 2017-07-23 ENCOUNTER — HOSPITAL ENCOUNTER (OUTPATIENT)
Facility: CLINIC | Age: 82
Setting detail: SPECIMEN
Discharge: HOME | End: 2017-07-23
Attending: INTERNAL MEDICINE | Admitting: INTERNAL MEDICINE
Payer: MEDICARE

## 2017-07-23 PROCEDURE — 84156 ASSAY OF PROTEIN URINE: CPT | Performed by: INTERNAL MEDICINE

## 2017-07-23 PROCEDURE — 81050 URINALYSIS VOLUME MEASURE: CPT | Performed by: INTERNAL MEDICINE

## 2017-07-24 ENCOUNTER — HOSPITAL ENCOUNTER (OUTPATIENT)
Dept: PET IMAGING | Facility: CLINIC | Age: 82
Discharge: HOME | End: 2017-07-24
Attending: INTERNAL MEDICINE | Admitting: INTERNAL MEDICINE
Payer: MEDICARE

## 2017-07-24 DIAGNOSIS — C90.00 MULTIPLE MYELOMA NOT HAVING ACHIEVED REMISSION (H): ICD-10-CM

## 2017-07-24 DIAGNOSIS — C90.00 MULTIPLE MYELOMA NOT HAVING ACHIEVED REMISSION (H): Primary | ICD-10-CM

## 2017-07-24 LAB
COLLECT DURATION TIME UR: 24 H
CREAT 24H UR-MRATE: 0.87 G/(24.H) (ref 0.8–1.8)
CREAT BLD-MCNC: 0.7 MG/DL (ref 0.52–1.04)
CREAT UR-MCNC: 57 MG/DL
GFR SERPL CREATININE-BSD FRML MDRD: 80 ML/MIN/1.7M2
GLUCOSE BLDC GLUCOMTR-MCNC: 88 MG/DL (ref 70–99)
PROT 24H UR-MRATE: 0.36 G/(24.H) (ref 0.04–0.23)
PROT UR-MCNC: 0.24 G/L
PROT/CREAT 24H UR: 0.42 G/G CR (ref 0–0.2)
SPECIMEN VOL UR: 1525 ML

## 2017-07-24 PROCEDURE — 71260 CT THORAX DX C+: CPT

## 2017-07-24 PROCEDURE — 25000128 H RX IP 250 OP 636: Performed by: INTERNAL MEDICINE

## 2017-07-24 PROCEDURE — 82565 ASSAY OF CREATININE: CPT

## 2017-07-24 PROCEDURE — A9552 F18 FDG: HCPCS | Performed by: INTERNAL MEDICINE

## 2017-07-24 PROCEDURE — 74177 CT ABD & PELVIS W/CONTRAST: CPT

## 2017-07-24 PROCEDURE — 34300033 ZZH RX 343: Performed by: INTERNAL MEDICINE

## 2017-07-24 PROCEDURE — 82962 GLUCOSE BLOOD TEST: CPT

## 2017-07-24 RX ORDER — IOPAMIDOL 755 MG/ML
20-135 INJECTION, SOLUTION INTRAVASCULAR ONCE
Status: COMPLETED | OUTPATIENT
Start: 2017-07-24 | End: 2017-07-24

## 2017-07-24 RX ADMIN — FLUDEOXYGLUCOSE F-18 15.91 MCI.: 500 INJECTION, SOLUTION INTRAVENOUS at 08:14

## 2017-07-24 RX ADMIN — IOPAMIDOL 100 ML: 755 INJECTION, SOLUTION INTRAVENOUS at 09:09

## 2017-07-24 NOTE — TELEPHONE ENCOUNTER
Left voicemail with Quiana to determine her level of interest in participation in a clinical study. If she is interested Dr. Parry stated we could proceed with the below tests. Thanks.

## 2017-07-24 NOTE — TELEPHONE ENCOUNTER
In review of her BMBX and FISH results patient appears to be eligible for E1A11. She still requires the following tests be done prior to randomization.     CMP  TSH  CRP inflammation   UPEP  Immunofixation of urine  EKG   Bone Survey       Her bone marrow biopsy outdates on Agusut 9th so ideally we would like to get her randomized prior to this.

## 2017-07-25 ENCOUNTER — HOSPITAL ENCOUNTER (OUTPATIENT)
Dept: CARDIOLOGY | Facility: CLINIC | Age: 82
Discharge: HOME | End: 2017-07-25
Attending: INTERNAL MEDICINE | Admitting: INTERNAL MEDICINE
Payer: MEDICARE

## 2017-07-25 DIAGNOSIS — R06.02 SOB (SHORTNESS OF BREATH): ICD-10-CM

## 2017-07-25 PROCEDURE — 93306 TTE W/DOPPLER COMPLETE: CPT

## 2017-07-25 PROCEDURE — 93306 TTE W/DOPPLER COMPLETE: CPT | Mod: 26 | Performed by: INTERNAL MEDICINE

## 2017-08-02 ENCOUNTER — ONCOLOGY VISIT (OUTPATIENT)
Dept: ONCOLOGY | Facility: CLINIC | Age: 82
End: 2017-08-02
Attending: INTERNAL MEDICINE
Payer: MEDICARE

## 2017-08-02 VITALS
OXYGEN SATURATION: 95 % | SYSTOLIC BLOOD PRESSURE: 143 MMHG | WEIGHT: 164.4 LBS | RESPIRATION RATE: 16 BRPM | HEART RATE: 79 BPM | DIASTOLIC BLOOD PRESSURE: 72 MMHG | BODY MASS INDEX: 30.07 KG/M2 | TEMPERATURE: 99.1 F

## 2017-08-02 DIAGNOSIS — C90.00 MULTIPLE MYELOMA NOT HAVING ACHIEVED REMISSION (H): Primary | ICD-10-CM

## 2017-08-02 PROCEDURE — 99215 OFFICE O/P EST HI 40 MIN: CPT | Performed by: INTERNAL MEDICINE

## 2017-08-02 PROCEDURE — 99212 OFFICE O/P EST SF 10 MIN: CPT

## 2017-08-02 ASSESSMENT — PAIN SCALES - GENERAL: PAINLEVEL: NO PAIN (0)

## 2017-08-02 NOTE — MR AVS SNAPSHOT
After Visit Summary   8/2/2017    Quiana Dunaway    MRN: [id number]           Patient Information     Date Of Birth          1935        Visit Information        Provider Department      8/2/2017 2:00 PM Alex Parry MD [facility]        Today's Diagnoses     Multiple myeloma not having achieved remission (H)    -  1      Care Instructions    Research nurse to talk to patient.  Side effect of velcade, revlimid and dexamethasone.  Side effect of zometa.  Start chemotherapy in about 10 days.          Follow-ups after your visit        Your next 10 appointments already scheduled     Aug 14, 2017 10:00 AM CDT   Level 2 with  INFUSION CHAIR 6   [facility] ([facility])    [facility]  [address]  [facility] [address] [phone]            Aug 14, 2017 10:30 AM CDT   Return Visit with Alex Parry MD   [facility] ([facility])    [facility]  [address]  [facility] [address] [phone]            Aug 30, 2017  1:00 PM CDT   Remote PPM Check with RIOS TECH1   [facility] PHYSICIANS HEART AT [place] ([facility])    [address]  [facility] [id number] [phone]           This appointment is for a remote check of your pacemaker.  This is not an appointment at the office.            Aug 31, 2017  5:30 PM CDT   Office Visit with César Chung MD   [facility]    [address] [facility] [id number] [phone]           Bring a current list of meds and any records pertaining to this visit. For Physicals, please bring immunization records and any forms needing to be filled out. Please arrive 10 minutes early to complete paperwork.              Who to contact     If you have questions or need follow up information about today's clinic visit or your  "schedule please contact [facility] directly at [phone].  Normal or non-critical lab and imaging results will be communicated to you by MyChart, letter or phone within 4 business days after the clinic has received the results. If you do not hear from us within 7 days, please contact the clinic through [employer] or phone. If you have a critical or abnormal lab result, we will notify you by phone as soon as possible.  Submit refill requests through Pentaho or call your pharmacy and they will forward the refill request to us. Please allow 3 business days for your refill to be completed.          Additional Information About Your Visit        Pentaho Information     Pentaho lets you send messages to your doctor, view your test results, renew your prescriptions, schedule appointments and more. To sign up, go to [email or web address] . Click on \"Log in\" on the left side of the screen, which will take you to the Welcome page. Then click on \"Sign up Now\" on the right side of the page.     You will be asked to enter the access code listed below, as well as some personal information. Please follow the directions to create your username and password.     Your access code is: [email or web address]  Expires: 2017  8:16 PM     Your access code will  in 90 days. If you need help or a new code, please call your [place] clinic or [phone].        Care EveryWhere ID     This is your Care EveryWhere ID. This could be used by other organizations to access your [place] medical records  [id number]        Your Vitals Were     Pulse Temperature Respirations Pulse Oximetry BMI (Body Mass Index)       79 99.1  F (37.3  C) (Oral) 16 95% 30.07 kg/m2        Blood Pressure from Last 3 Encounters:   17 143/72   17 156/66   17 155/75    Weight from Last 3 Encounters:   17 74.6 kg (164 lb 6.4 oz)   17 74.3 kg (163 lb 12.8 oz)   17 74.8 kg (165 lb)              Today, you had the " following     No orders found for display       Primary Care Provider Office Phone # Fax #    César Chung [phone]       [facility] [address] [place] [address]        Equal Access to Services     LILLIANA SANDOVAL : Hadii aad ku hadivelisseo Soomaali, waaxda luqadaha, qaybta kaalmada adeegyada, [place] hortonn aureliodenzel young laNellydiana villeda. So [facility] [phone].    ATENCIÓN: Si habla español, tiene a contreras disposición servicios gratuitos de asistencia lingüística. [facility] [phone].    We comply with applicable federal civil rights laws and [place] laws. We do not discriminate on the basis of race, color, national origin, age, disability sex, sexual orientation or gender identity.            Thank you!     Thank you for choosing [facility]  for your care. Our goal is always to provide you with excellent care. Hearing back from our patients is one way we can continue to improve our services. Please take a few minutes to complete the written survey that you may receive in the mail after your visit with us. Thank you!             Your Updated Medication List - Protect others around you: Learn how to safely use, store and throw away your medicines at www.disposemymeds.org.          This list is accurate as of: 8/2/17  3:59 PM.  Always use your most recent med list.                   Brand Name Dispense Instructions for use Diagnosis    aspirin 81 MG chewable tablet      Take 81 mg by mouth        calcium carbonate-vitamin D 600-400 MG-UNIT Chew     180 tablet    Take 1 chew tab by mouth 2 times daily    Multiple myeloma not having achieved remission (H)       escitalopram 5 MG tablet    LEXAPRO    90 tablet    TAKE 1 TABLET BY MOUTH DAILY    ROBER (generalized anxiety disorder)       lisinopril 10 MG tablet    PRINIVIL/ZESTRIL    90 tablet    Take 1 tablet (10 mg) by mouth daily    Benign essential hypertension       nitroGLYcerin 0.4 MG sublingual tablet    NITROSTAT    25  tablet    For chest pain place 1 tablet under the tongue every 5 minutes for 3 doses. If symptoms persist 5 minutes after 1st dose call 911.    Atypical chest pain

## 2017-08-02 NOTE — PROGRESS NOTES
"Oncology Rooming Note    August 2, 2017 2:04 PM   Quiana Dunaway is a 82 year old female who presents for:    Chief Complaint   Patient presents with     Oncology Clinic Visit     Multiple myeloma not having achieved remission      Initial Vitals: /72 (BP Location: Left arm, Patient Position: Sitting, Cuff Size: Adult Regular)  Pulse 79  Temp 99.1  F (37.3  C) (Oral)  Resp 16  Wt 74.6 kg (164 lb 6.4 oz)  SpO2 95%  BMI 30.07 kg/m2 Estimated body mass index is 30.07 kg/(m^2) as calculated from the following:    Height as of 6/21/17: 1.575 m (5' 2\").    Weight as of this encounter: 74.6 kg (164 lb 6.4 oz). Body surface area is 1.81 meters squared.  No Pain (0) Comment: Data Unavailable   No LMP recorded. Patient is postmenopausal.  Allergies reviewed: Yes  Medications reviewed: Yes    Medications: Medication refills not needed today.  Ph[facility] into Sub10 [employer] [address] [place] [address] [address] [facility] [address]    Clinical concerns: None              5 minutes for nursing intake (face to face time)     Isabel Sarmiento MA          DISCHARGE PLAN:    Courtney spoke with patient and daughter in laws  Side effects of velcade , revlimid and dex , zometa reviewed with her by Radha.  Dental clearance form provided  Will start chemo tentatively on 8/14/17.    Next appointments: See patient instruction section  Departure Mode: Ambulatory  Accompanied by: daughter-in-laws  25 minutes for nursing discharge (face to face time)   Madelyn Mackey RN      "

## 2017-08-02 NOTE — PATIENT INSTRUCTIONS
Research nurse to talk to patient.  Side effect of velcade, revlimid and dexamethasone.  Side effect of zometa.  Start chemotherapy in about 10 days.

## 2017-08-03 ENCOUNTER — DOCUMENTATION ONLY (OUTPATIENT)
Dept: ONCOLOGY | Facility: CLINIC | Age: 82
End: 2017-08-03

## 2017-08-03 NOTE — PROGRESS NOTES
Prior Authorization Approval    Authorization Effective Date: 05/05/2017    Authorization Expiration Date:  08/02/2020  Medication: Revlimid 25mg capsules  Approved Dose/Quantity: submitted for 25mg capsules #14 for 21 days  Reference #:   [id number]  Insurance Company:  [employer]  Expected CoPay:    $2888.22   CoPay Card Available:   No, pt has medicare   Foundation Assistance Needed:  Possibly, [employer], [employer] and patient advocate are open for funding.  Which Pharmacy is filling the prescription (Not needed for infusion/clinic administered):   specialty pharmacy  Pharmacy Notified:  yes  Patient Notified:  Yes, she was not eligible for copay assistance due to income. She is willing to pay the copay to start treatment and we will monitor other options for funding. [employer] stated they could not help either due to pts income.        Prior Authorization Retail Medication Request  Medication/Dose: Revlimid 25mg capsules  Diagnosis and ICD code:   Multiple myeloma not having achieved remission (H)  - Primary C90.00   New/Renewal/Insurance Change PA: new  Previously Tried and Failed Therapies: new diagnosis    Insurance ID (if provided): [id number]  Insurance Phone (if provided): [phone]    Any additional info from fax request: clinical questions: Is this primary therapy with Dexamethasone (YES) and will the patient be monitored for thromboembolism (YES)

## 2017-08-04 NOTE — TELEPHONE ENCOUNTER
Patient consented to study on 8/2/17- but returned call on 8/3/17 and rescinded her consent. Patient wishes to be treated off study and would like to proceed with Dr. Parry's recommendations. If I recall correctly patient would like to begin treatment on 8/14/17.     ANGIE Moore

## 2017-08-08 DIAGNOSIS — C90.00 MULTIPLE MYELOMA NOT HAVING ACHIEVED REMISSION (H): Primary | ICD-10-CM

## 2017-08-08 NOTE — PROGRESS NOTES
DATE OF SERVICE:  08/02/2017.     HEMATOLOGY HISTORY: Mrs. Dunaway is a lady with multiple myeloma (IgG kappa). High risk, t(14;16).  1.  On 05/01/2017:  -WBC of 3.4, hemoglobin of 10.2 and platelet of 154.    -CMP is normal.  -Vitamin B12 normal at 377.  -Folate is normal at 19.4  -Iron of 66 and ferritin of 98.  -TSH of 0.22  -Haptoglobin of 130.  2.  CT chest angiogram on 03/07/2017 did not reveal pulmonary embolism.  There is left thyroid nodule measuring 2.6 cm.  There is a 1.2 cm cystic appearing lesion in pancreatic tail.  It could be IPMN.  No splenomegaly.  3.  Colonoscopy on 04/28/2017 revealed colon polyps. Pathology revealed tubular adenoma.  No high-grade dysplasia.  4.  SPEP on 07/07/2017 reveals M-spike of 1.8.  5. Bone marrow biopsy on 07/12/2017 reveals hypercellular bone marrow with kappa monotypic plasma cell population consistent with multiple myeloma.  Bone marrow is 70% cellular.  Plasma cell is 50-60%.     -There is gain of chromosome 1, 5, 9, 15, and 17.  There is translocation 14;16.   6.  Multiple labs were done on 07/18/2017:  -M-spike of 1.9.   -Immunofixation reveals monoclonal IgG kappa.    -IgG level of 2970.  IgA of 15 and IgM of 175.    -Kappa free light chain of 67.7.  Lambda free light chain of 1.42.  Ratio of kappa to lambda of 47.71.    -Beta 2 microglobulin of 3.4.   7. PET scan on 07/24/2017 reveals several focal areas of increased FDG uptake consistent with multiple myeloma.  There is probable epithelial cyst in tail of the pancreas.  No associated abnormal FDG activity.  Left thyroid cysts or nodules without any FDG activity.  There is a 0.3 cm left upper lobe lung nodule.     SUBJECTIVE:    Ms. Quiana Dunaway is an 82-year-old female with multiple myeloma.  She is here for followup on the result of the investigations.      Multiple labs were done on 07/18/2017.  SPEP reveals M spike of 1.9.  Immunofixation reveals monoclonal IgG kappa.  IgG level of 2970.  IgA of 15 and IgM  of 175.  [place] free light chain of 67.7.  Lambda free light chain of 1.42.  Ratio of kappa to lambda of 47.71.  Beta 2 microglobulin of 3.4.      PET scan was done on 07/24/2017.  It revealed several focal areas of increased FDG uptake consistent with multiple myeloma.  There is probable epithelial cyst in tail of the pancreas.  No associated abnormal FDG activity.  Left thyroid cysts or nodules are present without any FDG activity.  There is a 0.3 cm left upper lobe lung nodule.      The patient overall is doing well.  She has no new complaints.  Some bone pain.  No headache or dizziness.  No chest pain or difficulty breathing.  No nausea or vomiting.  Appetite has been fairly good.  No recent infection.      PHYSICAL EXAMINATION:   GENERAL:  She is alert and oriented x3.   VITAL SIGNS:  Reviewed.   Rest of the system not examined.      ASSESSMENT:   1.  An 82-year-old female with IgG kappa multiple myeloma.   2.  Leukopenia and anemia secondary to multiple myeloma.   3.  Tiny lung nodule.   4.  Possible thyroid nodule or cyst.   5.  Probable epithelial cyst in tail of the pancreas.      PLAN:   1.  I had a long discussion with the patient and family members.  I reviewed the result of the investigation.  Labs were all reviewed. PET scan result was reviewed.  Pictures were shown.  I explained to them that it shows a hypermetabolic bony lesion which would be consistent with myeloma.  No other evidence of malignancy.  There are some other abnormal findings including lung nodule and cystic lesion in the pancreas which will be all monitored.  There may be a thyroid nodule or cyst.      2.  Discussed with her regarding treatment.  The patient needs treatment because of bone involvement.      Different treatment options discussed.  I discussed regarding combination of Velcade, Revlimid and dexamethasone.  Also discussed regarding Velcade, Cytoxan and dexamethasone.  We also discussed regarding clinical trial.      Our  research nurse met with the patient.  Discussed regarding clinical trial.  There is 1 clinical trial available comparing Velcade, Revlimid and dexamethasone versus carfilzomib, Revlimid and dexamethasone.  The patient wants some time to think about it.  She will let us know in the next few days.      If she does not go on clinical trial, my plan is to start her on Velcade, Revlimid and dexamethasone.  Side effects of these were discussed.  It can cause hair loss, oral ulcer, oral thrush, nausea, vomiting, gastric ulcer, low white count, infection, low hemoglobin, low platelet, bleeding, neuropathy and other side effects.  Revlimid can also cause blood clot.     The patient wants to start treatment mid August.  She has upcoming granddaughter's wedding.       3.  I discussed regarding bisphosphonate to reduce the risk of skeletal-related complication.  The patient agreeable for it.  Side effects including aches and pain, flu-like symptoms, jaw pain, jaw fracture (osteonecrosis) were discussed.  The patient advised to see a dentist.  After that, we will plan on starting Zometa.      4.  I will see her when she comess to start chemotherapy.  The patient advised to call if she has any questions.      Total time spent 45 minutes, more than half the time was spent in counseling.       YVES PEREZ MD             D: 2017 16:57   T: 2017 06:27   MT: [email or web address]      Name:     DUGLAS CASTILLO   MRN:      [id number]        Account:      [id number]   :      1935           Visit Date:   2017      Document: [id number]

## 2017-08-10 RX ORDER — LORAZEPAM 0.5 MG/1
0.5 TABLET ORAL EVERY 4 HOURS PRN
Qty: 30 TABLET | Refills: 3 | Status: SHIPPED | OUTPATIENT
Start: 2017-08-10 | End: 2017-10-10

## 2017-08-10 RX ORDER — ACYCLOVIR 400 MG/1
400 TABLET ORAL 2 TIMES DAILY
Qty: 60 TABLET | Refills: 5 | Status: SHIPPED | OUTPATIENT
Start: 2017-08-10 | End: 2017-10-09

## 2017-08-10 RX ORDER — PROCHLORPERAZINE MALEATE 10 MG
10 TABLET ORAL EVERY 6 HOURS PRN
Qty: 30 TABLET | Refills: 3 | Status: SHIPPED | OUTPATIENT
Start: 2017-08-10 | End: 2017-11-06

## 2017-08-14 ENCOUNTER — HOSPITAL ENCOUNTER (OUTPATIENT)
Facility: CLINIC | Age: 82
Setting detail: SPECIMEN
Discharge: HOME | End: 2017-08-14
Attending: INTERNAL MEDICINE | Admitting: INTERNAL MEDICINE
Payer: MEDICARE

## 2017-08-14 ENCOUNTER — INFUSION THERAPY VISIT (OUTPATIENT)
Dept: INFUSION THERAPY | Facility: CLINIC | Age: 82
End: 2017-08-14
Attending: INTERNAL MEDICINE
Payer: MEDICARE

## 2017-08-14 ENCOUNTER — ONCOLOGY VISIT (OUTPATIENT)
Dept: ONCOLOGY | Facility: CLINIC | Age: 82
End: 2017-08-14
Attending: INTERNAL MEDICINE
Payer: MEDICARE

## 2017-08-14 VITALS
SYSTOLIC BLOOD PRESSURE: 148 MMHG | DIASTOLIC BLOOD PRESSURE: 79 MMHG | HEART RATE: 63 BPM | WEIGHT: 167.6 LBS | TEMPERATURE: 97.9 F | HEIGHT: 62 IN | OXYGEN SATURATION: 95 % | BODY MASS INDEX: 30.84 KG/M2 | RESPIRATION RATE: 16 BRPM

## 2017-08-14 VITALS
HEART RATE: 63 BPM | WEIGHT: 167.6 LBS | OXYGEN SATURATION: 95 % | SYSTOLIC BLOOD PRESSURE: 148 MMHG | HEIGHT: 62 IN | DIASTOLIC BLOOD PRESSURE: 79 MMHG | RESPIRATION RATE: 16 BRPM | TEMPERATURE: 97.9 F | BODY MASS INDEX: 30.84 KG/M2

## 2017-08-14 DIAGNOSIS — C90.00 MULTIPLE MYELOMA NOT HAVING ACHIEVED REMISSION (H): Primary | ICD-10-CM

## 2017-08-14 LAB
ALBUMIN SERPL-MCNC: 3.5 G/DL (ref 3.4–5)
ALP SERPL-CCNC: 104 U/L (ref 40–150)
ALT SERPL W P-5'-P-CCNC: 21 U/L (ref 0–50)
ANION GAP SERPL CALCULATED.3IONS-SCNC: 5 MMOL/L (ref 3–14)
AST SERPL W P-5'-P-CCNC: 17 U/L (ref 0–45)
BASOPHILS # BLD AUTO: 0 10E9/L (ref 0–0.2)
BASOPHILS NFR BLD AUTO: 0.7 %
BILIRUB SERPL-MCNC: 0.6 MG/DL (ref 0.2–1.3)
BUN SERPL-MCNC: 15 MG/DL (ref 7–30)
CALCIUM SERPL-MCNC: 8.8 MG/DL (ref 8.5–10.1)
CHLORIDE SERPL-SCNC: 107 MMOL/L (ref 94–109)
CO2 SERPL-SCNC: 28 MMOL/L (ref 20–32)
CREAT SERPL-MCNC: 0.59 MG/DL (ref 0.52–1.04)
DIFFERENTIAL METHOD BLD: ABNORMAL
EOSINOPHIL # BLD AUTO: 0 10E9/L (ref 0–0.7)
EOSINOPHIL NFR BLD AUTO: 1.3 %
ERYTHROCYTE [DISTWIDTH] IN BLOOD BY AUTOMATED COUNT: 14.1 % (ref 10–15)
GFR SERPL CREATININE-BSD FRML MDRD: NORMAL ML/MIN/1.7M2
GLUCOSE SERPL-MCNC: 82 MG/DL (ref 70–99)
HCT VFR BLD AUTO: 28.1 % (ref 35–47)
HGB BLD-MCNC: 9.7 G/DL (ref 11.7–15.7)
IMM GRANULOCYTES # BLD: 0 10E9/L (ref 0–0.4)
IMM GRANULOCYTES NFR BLD: 0.7 %
LYMPHOCYTES # BLD AUTO: 1.3 10E9/L (ref 0.8–5.3)
LYMPHOCYTES NFR BLD AUTO: 44.2 %
MCH RBC QN AUTO: 31.9 PG (ref 26.5–33)
MCHC RBC AUTO-ENTMCNC: 34.5 G/DL (ref 31.5–36.5)
MCV RBC AUTO: 92 FL (ref 78–100)
MONOCYTES # BLD AUTO: 0.2 10E9/L (ref 0–1.3)
MONOCYTES NFR BLD AUTO: 8 %
NEUTROPHILS # BLD AUTO: 1.4 10E9/L (ref 1.6–8.3)
NEUTROPHILS NFR BLD AUTO: 45.1 %
NRBC # BLD AUTO: 0 10*3/UL
NRBC BLD AUTO-RTO: 0 /100
PLATELET # BLD AUTO: 137 10E9/L (ref 150–450)
POTASSIUM SERPL-SCNC: 3.8 MMOL/L (ref 3.4–5.3)
PROT SERPL-MCNC: 8.5 G/DL (ref 6.8–8.8)
RBC # BLD AUTO: 3.04 10E12/L (ref 3.8–5.2)
SODIUM SERPL-SCNC: 140 MMOL/L (ref 133–144)
WBC # BLD AUTO: 3 10E9/L (ref 4–11)

## 2017-08-14 PROCEDURE — 25000128 H RX IP 250 OP 636: Mod: JW | Performed by: INTERNAL MEDICINE

## 2017-08-14 PROCEDURE — 80053 COMPREHEN METABOLIC PANEL: CPT | Performed by: INTERNAL MEDICINE

## 2017-08-14 PROCEDURE — 85025 COMPLETE CBC W/AUTO DIFF WBC: CPT | Performed by: INTERNAL MEDICINE

## 2017-08-14 PROCEDURE — 96401 CHEMO ANTI-NEOPL SQ/IM: CPT

## 2017-08-14 PROCEDURE — 99214 OFFICE O/P EST MOD 30 MIN: CPT | Performed by: INTERNAL MEDICINE

## 2017-08-14 PROCEDURE — 99211 OFF/OP EST MAY X REQ PHY/QHP: CPT

## 2017-08-14 RX ORDER — SODIUM CHLORIDE 9 MG/ML
1000 INJECTION, SOLUTION INTRAVENOUS CONTINUOUS PRN
Status: CANCELLED
Start: 2017-08-28

## 2017-08-14 RX ORDER — SODIUM CHLORIDE 9 MG/ML
1000 INJECTION, SOLUTION INTRAVENOUS CONTINUOUS PRN
Status: CANCELLED
Start: 2017-08-14

## 2017-08-14 RX ORDER — METHYLPREDNISOLONE SODIUM SUCCINATE 125 MG/2ML
125 INJECTION, POWDER, LYOPHILIZED, FOR SOLUTION INTRAMUSCULAR; INTRAVENOUS
Status: CANCELLED
Start: 2017-08-14

## 2017-08-14 RX ORDER — ALBUTEROL SULFATE 0.83 MG/ML
2.5 SOLUTION RESPIRATORY (INHALATION)
Status: CANCELLED | OUTPATIENT
Start: 2017-08-21

## 2017-08-14 RX ORDER — MEPERIDINE HYDROCHLORIDE 50 MG/ML
25 INJECTION INTRAMUSCULAR; INTRAVENOUS; SUBCUTANEOUS EVERY 30 MIN PRN
Status: CANCELLED | OUTPATIENT
Start: 2017-08-28

## 2017-08-14 RX ORDER — DIPHENHYDRAMINE HYDROCHLORIDE 50 MG/ML
50 INJECTION INTRAMUSCULAR; INTRAVENOUS
Status: CANCELLED
Start: 2017-08-21

## 2017-08-14 RX ORDER — ALBUTEROL SULFATE 90 UG/1
1-2 AEROSOL, METERED RESPIRATORY (INHALATION)
Status: CANCELLED
Start: 2017-08-14

## 2017-08-14 RX ORDER — ALBUTEROL SULFATE 0.83 MG/ML
2.5 SOLUTION RESPIRATORY (INHALATION)
Status: CANCELLED | OUTPATIENT
Start: 2017-08-28

## 2017-08-14 RX ORDER — ALBUTEROL SULFATE 0.83 MG/ML
2.5 SOLUTION RESPIRATORY (INHALATION)
Status: CANCELLED | OUTPATIENT
Start: 2017-08-14

## 2017-08-14 RX ORDER — EPINEPHRINE 1 MG/ML
0.3 INJECTION INTRAMUSCULAR; INTRAVENOUS; SUBCUTANEOUS EVERY 5 MIN PRN
Status: CANCELLED | OUTPATIENT
Start: 2017-08-28

## 2017-08-14 RX ORDER — MEPERIDINE HYDROCHLORIDE 50 MG/ML
25 INJECTION INTRAMUSCULAR; INTRAVENOUS; SUBCUTANEOUS EVERY 30 MIN PRN
Status: CANCELLED | OUTPATIENT
Start: 2017-08-14

## 2017-08-14 RX ORDER — EPINEPHRINE 0.3 MG/.3ML
0.3 INJECTION SUBCUTANEOUS EVERY 5 MIN PRN
Status: CANCELLED | OUTPATIENT
Start: 2017-08-21

## 2017-08-14 RX ORDER — ALBUTEROL SULFATE 90 UG/1
1-2 AEROSOL, METERED RESPIRATORY (INHALATION)
Status: CANCELLED
Start: 2017-08-28

## 2017-08-14 RX ORDER — EPINEPHRINE 1 MG/ML
0.3 INJECTION INTRAMUSCULAR; INTRAVENOUS; SUBCUTANEOUS EVERY 5 MIN PRN
Status: CANCELLED | OUTPATIENT
Start: 2017-08-14

## 2017-08-14 RX ORDER — ALBUTEROL SULFATE 90 UG/1
1-2 AEROSOL, METERED RESPIRATORY (INHALATION)
Status: CANCELLED
Start: 2017-08-21

## 2017-08-14 RX ORDER — METHYLPREDNISOLONE SODIUM SUCCINATE 125 MG/2ML
125 INJECTION, POWDER, LYOPHILIZED, FOR SOLUTION INTRAMUSCULAR; INTRAVENOUS
Status: CANCELLED
Start: 2017-08-21

## 2017-08-14 RX ORDER — DIPHENHYDRAMINE HYDROCHLORIDE 50 MG/ML
50 INJECTION INTRAMUSCULAR; INTRAVENOUS
Status: CANCELLED
Start: 2017-08-14

## 2017-08-14 RX ORDER — EPINEPHRINE 0.3 MG/.3ML
0.3 INJECTION SUBCUTANEOUS EVERY 5 MIN PRN
Status: CANCELLED | OUTPATIENT
Start: 2017-08-28

## 2017-08-14 RX ORDER — SODIUM CHLORIDE 9 MG/ML
1000 INJECTION, SOLUTION INTRAVENOUS CONTINUOUS PRN
Status: CANCELLED
Start: 2017-08-21

## 2017-08-14 RX ORDER — DIPHENHYDRAMINE HYDROCHLORIDE 50 MG/ML
50 INJECTION INTRAMUSCULAR; INTRAVENOUS
Status: CANCELLED
Start: 2017-08-28

## 2017-08-14 RX ORDER — LORAZEPAM 2 MG/ML
0.5 INJECTION INTRAMUSCULAR EVERY 4 HOURS PRN
Status: CANCELLED
Start: 2017-08-28

## 2017-08-14 RX ORDER — METHYLPREDNISOLONE SODIUM SUCCINATE 125 MG/2ML
125 INJECTION, POWDER, LYOPHILIZED, FOR SOLUTION INTRAMUSCULAR; INTRAVENOUS
Status: CANCELLED
Start: 2017-08-28

## 2017-08-14 RX ORDER — EPINEPHRINE 1 MG/ML
0.3 INJECTION INTRAMUSCULAR; INTRAVENOUS; SUBCUTANEOUS EVERY 5 MIN PRN
Status: CANCELLED | OUTPATIENT
Start: 2017-08-21

## 2017-08-14 RX ORDER — LENALIDOMIDE 10 MG/1
10 CAPSULE ORAL DAILY
Qty: 14 CAPSULE | Refills: 0 | Status: SHIPPED | OUTPATIENT
Start: 2017-08-14 | End: 2018-12-26

## 2017-08-14 RX ORDER — MEPERIDINE HYDROCHLORIDE 50 MG/ML
25 INJECTION INTRAMUSCULAR; INTRAVENOUS; SUBCUTANEOUS EVERY 30 MIN PRN
Status: CANCELLED | OUTPATIENT
Start: 2017-08-21

## 2017-08-14 RX ORDER — EPINEPHRINE 0.3 MG/.3ML
0.3 INJECTION SUBCUTANEOUS EVERY 5 MIN PRN
Status: CANCELLED | OUTPATIENT
Start: 2017-08-14

## 2017-08-14 RX ORDER — DEXAMETHASONE 4 MG/1
40 TABLET ORAL
Qty: 30 TABLET | Refills: 0 | Status: SHIPPED | OUTPATIENT
Start: 2017-08-14 | End: 2017-08-29

## 2017-08-14 RX ORDER — LORAZEPAM 2 MG/ML
0.5 INJECTION INTRAMUSCULAR EVERY 4 HOURS PRN
Status: CANCELLED
Start: 2017-08-21

## 2017-08-14 RX ORDER — LORAZEPAM 2 MG/ML
0.5 INJECTION INTRAMUSCULAR EVERY 4 HOURS PRN
Status: CANCELLED
Start: 2017-08-14

## 2017-08-14 RX ADMIN — BORTEZOMIB 2.7 MG: 3.5 INJECTION, POWDER, LYOPHILIZED, FOR SOLUTION INTRAVENOUS; SUBCUTANEOUS at 11:50

## 2017-08-14 ASSESSMENT — PAIN SCALES - GENERAL
PAINLEVEL: NO PAIN (0)
PAINLEVEL: NO PAIN (0)

## 2017-08-14 NOTE — PROGRESS NOTES
Infusion Nursing Note:  Quiana Dunaway presents today for C1D1 Velcade, Revlimid, Dex.    Patient seen by provider today: Yes: Dr. Parry   present during visit today: Not Applicable.    Note: Patient received ativan, compazine, acyclovir and dexamethasone take home meds today. Pharmacy staff reviewed schedule and all meds with patient. Will have revlimid delivered to her home to start tomorrow, ok per Dr. Parry.    First time getting chemotherapy today. Oriented to infusion center. Chemotherapy teaching, side effects, and schedule reviewed with patient. General and individual chemotherapy side effects reviewed with patient including fatigue, immunosuppression, nausea/vomiting, constipation/diarrhea and peripheral neuropathy. Pt instructed to call care coordinator, triage (or MD on call if after hours/weekends) with chills/temp >=100.5, questions/concerns. Pt stated understanding of plan.     Intravenous Access:  Lab draw site right AC, Needle type butterfly, Gauge 23.  Labs drawn without difficulty.    Treatment Conditions:  Lab Results   Component Value Date    HGB 9.7 08/14/2017     Lab Results   Component Value Date    WBC 3.0 08/14/2017      Lab Results   Component Value Date    ANEU 1.4 08/14/2017     Lab Results   Component Value Date     08/14/2017      Lab Results   Component Value Date     08/14/2017                   Lab Results   Component Value Date    POTASSIUM 3.8 08/14/2017           No results found for: MAG         Lab Results   Component Value Date    CR  Creat clearance 0.59  89.72mL/min 08/14/2017 08/14/2017                   Lab Results   Component Value Date    HUMBERTO 8.8 08/14/2017                Lab Results   Component Value Date    BILITOTAL 0.6 08/14/2017           Lab Results   Component Value Date    ALBUMIN 3.5 08/14/2017                    Lab Results   Component Value Date    ALT 21 08/14/2017           Lab Results   Component Value Date    AST 17 08/14/2017      Results reviewed, labs MET treatment parameters, ok to proceed with treatment.        Post Infusion Assessment:  Patient tolerated injection without incident.   Site patent and intact, free from redness, edema or discomfort.  No evidence of extravasations.  Access discontinued per protocol.    Discharge Plan:   Discharge instructions reviewed with: Patient.  Patient and/or family verbalized understanding of discharge instructions and all questions answered.  Copy of AVS reviewed with patient and/or family.  Patient will return 8/21 for next appointment.  Patient discharged in stable condition accompanied by: daughter.  Departure Mode: Ambulatory.    Amanda Howe RN

## 2017-08-14 NOTE — PROGRESS NOTES
"Oncology Rooming Note    August 14, 2017 10:41 AM   Quiana Dunaway is a 82 year old female who presents for:    Chief Complaint   Patient presents with     Oncology Clinic Visit     Multiple myeloma not having achieved remission      Initial Vitals: /79  Pulse 63  Temp 97.9  F (36.6  C) (Oral)  Resp 16  Ht 1.575 m (5' 2.01\")  Wt 76 kg (167 lb 9.6 oz)  SpO2 95%  BMI 30.65 kg/m2 Estimated body mass index is 30.65 kg/(m^2) as calculated from the following:    Height as of this encounter: 1.575 m (5' 2.01\").    Weight as of this encounter: 76 kg (167 lb 9.6 oz). Body surface area is 1.82 meters squared.  No Pain (0) Comment: Data Unavailable   No LMP recorded. Patient is postmenopausal.  Allergies reviewed: Yes  Medications reviewed: Yes    Medications: Medication refills not needed today.  Pharmacy name entered into [facility]:    [facility] - [place], [place] - [address] [facility] [address]  [facility] [place], [place] - [address]  [facility] - [place], [place] - [address]    Clinical concerns: None                      4 minutes for nursing intake (face to face time)     Isabel Sarmiento MA    DISCHARGE PLAN:  1.)Patient to be scheduled for weekly Velcade.   2.) Patient met with [facility] regarding tolerance, delivery, and side effects of Revlimid.   3.)Patient to be scheduled with follow up with Dr. Parry 9/11/17.   Next appointments: See patient instruction section  Departure Mode: Ambulatory  Accompanied by:  daughter  10minutes for nursing discharge (face to face time)   Jodee Shankar RN          "

## 2017-08-14 NOTE — ORAL ONC MGMT
"Oral Chemotherapy Monitoring Program    Primary Oncologist: Sohan  Primary Oncology Clinic: [facility] Diagnosis: Multiple Myeloma    Drug: Lenalidomide 10mg days 1-14 q 21 days, with bortezomib and dexamethasone weekly  Start Date: 8/14/17 (patient may not start lenalidomide until 8/15/17)   Dose is appropriate for patients: Renal Function, dose reduced to 10mg due to renal impairment (<60ml/min, per cockcroft gault)  Expected duration of therapy: Until disease progression or unacceptable toxicity    Drug Interaction Assessment: None at this time.    Lab Monitoring Plan  Monitoring plan: (for myeloma) Pregnancy: Pre-phase (10-14 days before therapy), within 24 hours before therapy, C1D8, C1D15, C1D22, C2D1, C3D1, C4D1, C5D1, C6D1   C1D1+   CMP, CBC, Preg C2D1+ Call, CMP, CBC, Preg C3D1+ Call, CMP, CBC, Preg C4D1+ Call, CMP, CBC, Preg C5D1+ Call, CMP, CBC, Preg C6D1+ Call, CMP, CBC, Preg   C1D8+ Preg C2D8+  C3D8+  C4D8+  C5D8+  C6D8+    C1D15+ Call, CBC, Preg C2D15+ CBC C3D15+ CBC C4D15+  C5D15+  C6D15+    C1D22+ Preg C2D22+  C3D22+  C4D22+  C5D22+  C6D22+    CBC Q2 weeks for first 3 months, then monthly  CMP monthly  Pregnancy test weekly for first month, then monthly if indicated     Subjective/Objective:  Quiana Dunaway is a 82 year old female seen in clinic for an initial visit for oral chemotherapy education.      ORAL CHEMOTHERAPY 8/14/2017   Drug Name Revlimid (Lenalidomide)   Current Dosage 10mg   Current Schedule Daily   Cycle Details 2 weeks on 1 week off   Start Date of Last Cycle 8/14/2017     Vitals:  BP:   BP Readings from Last 1 Encounters:   08/14/17 148/79     Wt Readings from Last 1 Encounters:   08/14/17 76 kg (167 lb 9.6 oz)     Estimated body surface area is 1.82 meters squared as calculated from the following:    Height as of this encounter: 1.575 m (5' 2.01\").    Weight as of this encounter: 76 kg (167 lb 9.6 oz).      Labs:  Lab Results   Component Value Date     08/14/2017    "   Lab Results   Component Value Date    POTASSIUM 3.8 08/14/2017     Lab Results   Component Value Date    HUMBERTO 8.8 08/14/2017     No results found for: MAG  No results found for: PHOS  Lab Results   Component Value Date    ALBUMIN 3.5 08/14/2017     Lab Results   Component Value Date    BUN 15 08/14/2017     Lab Results   Component Value Date    CR 0.59 08/14/2017       Lab Results   Component Value Date    AST 17 08/14/2017     Lab Results   Component Value Date    ALT 21 08/14/2017     Lab Results   Component Value Date    BILITOTAL 0.6 08/14/2017       Lab Results   Component Value Date    WBC 3.0 08/14/2017     Lab Results   Component Value Date    HGB 9.7 08/14/2017     Lab Results   Component Value Date     08/14/2017     Lab Results   Component Value Date    ANEU 1.4 08/14/2017       Assessment:  Patient is appropriate to start therapy.    Plan:  Basic chemotherapy teaching was reviewed with the patient and caregiver including indication, start date of therapy, dose, administration, adverse effects, missed doses, food and drug interactions, monitoring, side effect management, office contact information, and safe handling. Written materials were provided and all questions answered.    Follow-Up:  8/21/17, one week following treatment start to ensure access to medications and see how tolerating.     Marie Jaime, PharmD, MPH, BCOP  Hematology/Oncology Clinical Pharmacist   [facility]   [email or web address]

## 2017-08-14 NOTE — MR AVS SNAPSHOT
After Visit Summary   8/14/2017    Quiana Dunaway    MRN: [id number]           Patient Information     Date Of Birth          1935        Visit Information        Provider Department      8/14/2017 10:30 AM Alex Parry MD [facility]        Today's Diagnoses     Multiple myeloma not having achieved remission (H)    -  1      Care Instructions    Start chemotherapy.  Follow up in 3-4 weeks.          Follow-ups after your visit        Your next 10 appointments already scheduled     Aug 21, 2017  1:30 PM CDT   Level 1 with  INFUSION CHAIR 11   [facility] ([facility])    [facility]  [address]  [facility] [address]   [phone]            Aug 28, 2017  2:00 PM CDT   Level 1 with  INFUSION CHAIR 14   [facility] ([facility])    [facility]  [address]  [facility] [address]   [phone]            Aug 30, 2017  1:00 PM CDT   Remote PPM Check with RIOS TECH1   [facility] [place] ([facility])    [address]  [facility] [place] [facility] [id number] [phone]           This appointment is for a remote check of your pacemaker.  This is not an appointment at the office.            Aug 31, 2017  5:30 PM CDT   Office Visit with César Chung MD   [facility]    [address] [address] [phone]           Bring a current list of meds and any records pertaining to this visit. For Physicals, please bring immunization records and any forms needing to be filled out. Please arrive 10 minutes early to complete paperwork.            Sep 05, 2017  2:00 PM CDT   Level 1 with  INFUSION CHAIR 14   [facility] ([facility])    [facility]  [address]  [place] [address]   [phone]            Sep 11, 2017  2:00 PM CDT   Level 1 with  INFUSION CHAIR 13   [facility] ([facility])    [facility] [place]  [address] [address]   [phone]            Sep 11, 2017  2:30 PM CDT   Return Visit with Alex Parry MD   [facility] ([facility])    [facility] [place]  [address] [address]   [phone]              Who to contact     If you have questions or need follow up information about today's clinic visit or your schedule please contact [facility] directly at [phone].  Normal or non-critical lab and imaging results will be communicated to you by MyChart, letter or phone within 4 business days after the clinic has received the results. If you do not hear from us within 7 days, please contact the clinic through Digitwhiz[employer] or phone. If you have a critical or abnormal lab result, we will notify you by phone as soon as possible.  Submit refill requests through [employer] or call your pharmacy and they will forward the refill request to us. Please allow 3 business days for your refill to be completed.          Additional Information About Your Visit        Digitwhizhart Information     [employer] lets you send messages to your doctor, view your test results, renew your prescriptions, schedule appointments and more. To sign up, go to [email or web address] . Click on \"Log in\" on the left side of the screen, which will take you to the Welcome page. Then click on \"Sign up Now\" on the right side of the page.     You will be asked to enter the access code listed below, as well as some personal information. Please follow the directions to create your username and password.     Your access code is: [email or web address]  Expires: 2017  8:16 PM     Your access code will  in 90 days. If you need help or a new " "code, please call your [place] clinic or [phone].        Care EveryWhere ID     This is your Care EveryWhere ID. This could be used by other organizations to access your [place] medical records  [id number]        Your Vitals Were     Pulse Temperature Respirations Height Pulse Oximetry BMI (Body Mass Index)    63 97.9  F (36.6  C) (Oral) 16 1.575 m (5' 2.01\") 95% 30.65 kg/m2       Blood Pressure from Last 3 Encounters:   08/14/17 148/79   08/14/17 148/79   08/02/17 143/72    Weight from Last 3 Encounters:   08/14/17 76 kg (167 lb 9.6 oz)   08/14/17 76 kg (167 lb 9.6 oz)   08/02/17 74.6 kg (164 lb 6.4 oz)              Today, you had the following     No orders found for display         Today's Medication Changes          These changes are accurate as of: 8/14/17  5:37 PM.  If you have any questions, ask your nurse or doctor.               Start taking these medicines.        Dose/Directions    dexamethasone 4 MG tablet   Commonly known as:  DECADRON   Used for:  Multiple myeloma not having achieved remission (H)        Dose:  40 mg   Take 10 tablets (40 mg) by mouth every 7 days for 3 doses Days 1, 8, and 15.   Quantity:  30 tablet   Refills:  0       LENalidomide 10 MG Caps capsule CHEMO   Commonly known as:  REVLIMID   Used for:  Multiple myeloma not having achieved remission (H)   Started by:  Alex Parry MD        Dose:  10 mg   Take 1 capsule (10 mg) by mouth daily for 14 days Days 1 through 14.   Quantity:  14 capsule   Refills:  0            Where to get your medicines      These medications were sent to [place] [facility] - [facility] [address]   [address]  [facility] [address]    Hours:  Mon-Fri 8:30am-5:00pm Toll Free [phone] Phone:  [phone]     LENalidomide 10 MG Caps capsule CHEMO         These medications were sent to [facility] [facility] [place] [facility] [place] - [address]  [address] [place] [address] [address]     Phone:  " [phone]     dexamethasone 4 MG tablet                Primary Care Provider Office Phone # Fax #    César Chung [phone] [address] [address]        Equal Access to Services     LILLIANA SANDOVAL : Hadii althea ku kio Soterrieali, waaxda luqadaha, qaybta kaalmada adeterence, [place] young lalylyfrancisco villeda. So [facility] [phone].    ATENCIÓN: Si habla español, tiene a contreras disposición servicios gratuitos de asistencia lingüística. Llame al [phone].    We comply with applicable federal civil rights laws and [place] laws. We do not discriminate on the basis of race, color, national origin, age, disability sex, sexual orientation or gender identity.            Thank you!     Thank you for choosing [facility]  for your care. Our goal is always to provide you with excellent care. Hearing back from our patients is one way we can continue to improve our services. Please take a few minutes to complete the written survey that you may receive in the mail after your visit with us. Thank you!             Your Updated Medication List - Protect others around you: Learn how to safely use, store and throw away your medicines at www.disposemymeds.org.          This list is accurate as of: 8/14/17  5:37 PM.  Always use your most recent med list.                   Brand Name Dispense Instructions for use Diagnosis    acyclovir 400 MG tablet    ZOVIRAX    60 tablet    Take 1 tablet (400 mg) by mouth 2 times daily Viral Prophylaxis.    Multiple myeloma not having achieved remission (H)       aspirin 81 MG chewable tablet      Take 81 mg by mouth        calcium carbonate-vitamin D 600-400 MG-UNIT Chew     180 tablet    Take 1 chew tab by mouth 2 times daily    Multiple myeloma not having achieved remission (H)       dexamethasone 4 MG tablet    DECADRON    30 tablet    Take 10 tablets (40 mg) by mouth every 7 days for 3 doses Days 1, 8, and 15.    Multiple myeloma not  having achieved remission (H)       escitalopram 5 MG tablet    LEXAPRO    90 tablet    TAKE 1 TABLET BY MOUTH DAILY    ROBER (generalized anxiety disorder)       LENalidomide 10 MG Caps capsule CHEMO    REVLIMID    14 capsule    Take 1 capsule (10 mg) by mouth daily for 14 days Days 1 through 14.    Multiple myeloma not having achieved remission (H)       lisinopril 10 MG tablet    PRINIVIL/ZESTRIL    90 tablet    Take 1 tablet (10 mg) by mouth daily    Benign essential hypertension       LORazepam 0.5 MG tablet    ATIVAN    30 tablet    Take 1 tablet (0.5 mg) by mouth every 4 hours as needed (Anxiety, Nausea/Vomiting or Sleep)    Multiple myeloma not having achieved remission (H)       nitroGLYcerin 0.4 MG sublingual tablet    NITROSTAT    25 tablet    For chest pain place 1 tablet under the tongue every 5 minutes for 3 doses. If symptoms persist 5 minutes after 1st dose call 911.    Atypical chest pain       prochlorperazine 10 MG tablet    COMPAZINE    30 tablet    Take 1 tablet (10 mg) by mouth every 6 hours as needed (Nausea/Vomiting)    Multiple myeloma not having achieved remission (H)

## 2017-08-14 NOTE — PROGRESS NOTES
HEMATOLOGY HISTORY: Mrs. Dunaway is a lady with multiple myeloma (IgG kappa). High risk, t(14;16).  1.  On 05/01/2017:  -WBC of 3.4, hemoglobin of 10.2 and platelet of 154.    -CMP is normal.  -Vitamin B12 normal at 377.  -Folate is normal at 19.4  -Iron of 66 and ferritin of 98.  -TSH of 0.22  -Haptoglobin of 130.  2.  CT chest angiogram on 03/07/2017 did not reveal pulmonary embolism.  There is left thyroid nodule measuring 2.6 cm.  There is a 1.2 cm cystic appearing lesion in pancreatic tail.  It could be IPMN.  No splenomegaly.  3.  Colonoscopy on 04/28/2017 revealed colon polyps. Pathology revealed tubular adenoma.  No high-grade dysplasia.  4.  SPEP on 07/07/2017 reveals M-spike of 1.8.  5. Bone marrow biopsy on 07/12/2017 reveals hypercellular bone marrow with kappa monotypic plasma cell population consistent with multiple myeloma.  Bone marrow is 70% cellular.  Plasma cell is 50-60%.     -There is gain of chromosome 1, 5, 9, 15, and 17.  There is translocation 14;16.   6.  Multiple labs were done on 07/18/2017:  -M-spike of 1.9.   -Immunofixation reveals monoclonal IgG kappa.    -IgG level of 2970.  IgA of 15 and IgM of 175.    -Kappa free light chain of 67.7.  Lambda free light chain of 1.42.  Ratio of kappa to lambda of 47.71.    -Beta 2 microglobulin of 3.4.   7. PET scan on 07/24/2017 reveals several focal areas of increased FDG uptake consistent with multiple myeloma.  There is probable epithelial cyst in tail of the pancreas.  No associated abnormal FDG activity.  Left thyroid cysts or nodules without any FDG activity.  There is a 0.3 cm left upper lobe lung nodule.   8.  Velcade, Revlimid and dexamethasone started on 08/14/2017.     SUBJECTIVE:    Ms. Quiana Dunaway is an 82-year-old female with IgG kappa multiple myeloma.  She is here to start chemotherapy.  She has no new complaints or concerns.        The patient overall is doing well. Some bone pain.  No headache or dizziness.  No chest pain or  difficulty breathing.  No nausea or vomiting.  Appetite has been fairly good.         PHYSICAL EXAMINATION:   GENERAL:  She is alert and oriented x3.   VITAL SIGNS:  Reviewed.   Rest of the system not examined.     LABS:  Reviewed.      ASSESSMENT:   1.  An 82-year-old female with IgG kappa multiple myeloma.   2.  Pancytopenia secondary to multiple myeloma.   3.  Tiny lung nodule.   4.  Possible thyroid nodule or cyst.   5.  Probable epithelial cyst in tail of the pancreas.       PLAN:   1.  Discussed with her regarding multiple myeloma.  Also discussed regarding treatment options.  Patient has decided not to participate in clinical trial.    Discussed regarding chemotherapy.  My plan is to treat her with Velcade, Revlimid and dexamethasone.  Patient agreeable for it.  Side effects were reviewed. It can cause hair loss, oral ulcer, oral thrush, nausea, vomiting, gastric ulcer, low white count, infection, low hemoglobin, low platelet, bleeding, neuropathy and other side effects.  Revlimid can also cause blood clot.  Dexamethasone can cause insomnia, elevated blood sugar and increase appetite.    Treatment with Velcade, Revlimid and dexamethasone will be started today.      2.  After dental clearance, patient will be started on IV bisphosphonate. Side effects including aches and pain, flu-like symptoms, jaw pain, jaw fracture (osteonecrosis) were discussed.        3.  I will see her in about 1 month. Patient advised to call if she has any questions or side effects.       Total time spent 25 minutes, more than half time was spent counseling.

## 2017-08-14 NOTE — MR AVS SNAPSHOT
After Visit Summary   8/14/2017    Quiana Dunaway    MRN: [id number]           Patient Information     Date Of Birth          1935        Visit Information        Provider Department      8/14/2017 10:00 AM  INFUSION CHAIR 6 [facility]        Today's Diagnoses     Multiple myeloma not having achieved remission (H)    -  1       Follow-ups after your visit        Your next 10 appointments already scheduled     Aug 21, 2017  1:30 PM CDT   Level 1 with  INFUSION CHAIR 11   [facility] ([facility])    [facility]  [address]  [place] [address]   [phone]            Aug 28, 2017  2:00 PM CDT   Level 1 with  INFUSION CHAIR 14   [facility] ([facility])    [facility]  [address]  [place] [address]   [phone]            Aug 30, 2017  1:00 PM CDT   Remote PPM Check with RIOS TECH1   [facility] [place] ([facility])    [address]  [place] [address] [phone]           This appointment is for a remote check of your pacemaker.  This is not an appointment at the office.            Aug 31, 2017  5:30 PM CDT   [facility] with C[facility]ar [facility]ung MD   [facility]    [address] [place] [address] [phone]           Bring a current list of meds and any records pertaining to this visit. For Physicals, please bring immunization records and any forms needing to be filled out. Please arrive 10 minutes early to complete paperwork.            Sep 05, 2017  2:00 PM CDT   Level 1 with  INFUSION CHAIR 14   [facility] ([facility])    [facility]  [address]  [place] [address] [phone]             "Sep 11, 2017  2:00 PM CDT   Level 1 with  INFUSION CHAIR 13   [facility] ([facility])    [facility] [place]  [place] [address] [phone]            Sep 11, 2017  2:30 PM CDT   Return Visit with Alex Parry MD   [facility] ([facility])    [facility] [place]  [address]  [place] [address] [phone]              Who to contact     If you have questions or need follow up information about today's clinic visit or your schedule please contact [facility] directly at [phone].  Normal or non-critical lab and imaging results will be communicated to you by 2NDNATUREhart, letter or phone within 4 business days after the clinic has received the results. If you do not hear from us within 7 days, please contact the clinic through 2NDNATUREhart or phone. If you have a critical or abnormal lab result, we will notify you by phone as soon as possible.  Submit refill requests through [employer] or call your pharmacy and they will forward the refill request to us. Please allow 3 business days for your refill to be completed.          Additional Information About Your Visit        2NDNATUREharByAllAccounts Information     [employer] lets you send messages to your doctor, view your test results, renew your prescriptions, schedule appointments and more. To sign up, go to [email or web address] . Click on \"Log in\" on the left side of the screen, which will take you to the Welcome page. Then click on \"Sign up Now\" on the right side of the page.     You will be asked to enter the access code listed below, as well as some personal information. Please follow the directions to create your username and password.     Your access code is: [email or web address]  Expires: 2017  8:16 PM     Your access code will  in 90 days. If you need help or a new code, please call your [place] " "clinic or [phone].        Care EveryWhere ID     This is your Care EveryWhere ID. This could be used by other organizations to access your [place] medical records  [id number]        Your Vitals Were     Pulse Temperature Respirations Height Pulse Oximetry BMI (Body Mass Index)    63 97.9  F (36.6  C) (Oral) 16 1.575 m (5' 2.01\") 95% 30.65 kg/m2       Blood Pressure from Last 3 Encounters:   08/14/17 148/79   08/14/17 148/79   08/02/17 143/72    Weight from Last 3 Encounters:   08/14/17 76 kg (167 lb 9.6 oz)   08/14/17 76 kg (167 lb 9.6 oz)   08/02/17 74.6 kg (164 lb 6.4 oz)              We Performed the Following     CBC with platelets differential     Comprehensive metabolic panel          Today's Medication Changes          These changes are accurate as of: 8/14/17 12:13 PM.  If you have any questions, ask your nurse or doctor.               Start taking these medicines.        Dose/Directions    dexamethasone 4 MG tablet   Commonly known as:  DECADRON   Used for:  Multiple myeloma not having achieved remission (H)        Dose:  40 mg   Take 10 tablets (40 mg) by mouth every 7 days for 3 doses Days 1, 8, and 15.   Quantity:  30 tablet   Refills:  0       LENalidomide 10 MG Caps capsule CHEMO   Commonly known as:  REVLIMID   Used for:  Multiple myeloma not having achieved remission (H)   Started by:  Alex Parry MD        Dose:  10 mg   Take 1 capsule (10 mg) by mouth daily for 14 days Days 1 through 14.   Quantity:  14 capsule   Refills:  0            Where to get your medicines      These medications were sent to [place] [facility] - [facility] [address]   [address] [facility] [address]    Hours:  Mon-Fri 8:30am-5:00pm Toll Free [phone] Phone:  [phone]     LENalidomide 10 MG Caps capsule CHEMO         These medications were sent to [facility] [facility] [place] [facility] [place] - [address]  [address] [address]     " Phone:  [phone]     dexamethasone 4 MG tablet                Primary Care Provider Office Phone # Fax #    César Chung [phone] [address] [address]        Equal Access to Services     PAMELLAKEVON LORI : Hadmary kate althea quevedo kio Soterrieali, waaxda luqadaha, qaybta kaalmada adedenzelyada, pho[place] hortonn carlos young lalylyfrancisco . So [facility] [phone].    ATENCIÓN: Si habla español, tiene a contreras disposición servicios gratuitos de asistencia lingüística. Llame al [phone].    We comply with applicable federal civil rights laws and [place] laws. We do not discriminate on the basis of race, color, national origin, age, disability sex, sexual orientation or gender identity.            Thank you!     Thank you for choosing [facility]  for your care. Our goal is always to provide you with excellent care. Hearing back from our patients is one way we can continue to improve our services. Please take a few minutes to complete the written survey that you may receive in the mail after your visit with us. Thank you!             Your Updated Medication List - Protect others around you: Learn how to safely use, store and throw away your medicines at www.disposemymeds.org.          This list is accurate as of: 8/14/17 12:13 PM.  Always use your most recent med list.                   Brand Name Dispense Instructions for use Diagnosis    acyclovir 400 MG tablet    ZOVIRAX    60 tablet    Take 1 tablet (400 mg) by mouth 2 times daily Viral Prophylaxis.    Multiple myeloma not having achieved remission (H)       aspirin 81 MG chewable tablet      Take 81 mg by mouth        calcium carbonate-vitamin D 600-400 MG-UNIT Chew     180 tablet    Take 1 chew tab by mouth 2 times daily    Multiple myeloma not having achieved remission (H)       dexamethasone 4 MG tablet    DECADRON    30 tablet    Take 10 tablets (40 mg) by mouth every 7 days for 3 doses Days 1, 8, and 15.     Multiple myeloma not having achieved remission (H)       escitalopram 5 MG tablet    LEXAPRO    90 tablet    TAKE 1 TABLET BY MOUTH DAILY    ROBER (generalized anxiety disorder)       LENalidomide 10 MG Caps capsule CHEMO    REVLIMID    14 capsule    Take 1 capsule (10 mg) by mouth daily for 14 days Days 1 through 14.    Multiple myeloma not having achieved remission (H)       lisinopril 10 MG tablet    PRINIVIL/ZESTRIL    90 tablet    Take 1 tablet (10 mg) by mouth daily    Benign essential hypertension       LORazepam 0.5 MG tablet    ATIVAN    30 tablet    Take 1 tablet (0.5 mg) by mouth every 4 hours as needed (Anxiety, Nausea/Vomiting or Sleep)    Multiple myeloma not having achieved remission (H)       nitroGLYcerin 0.4 MG sublingual tablet    NITROSTAT    25 tablet    For chest pain place 1 tablet under the tongue every 5 minutes for 3 doses. If symptoms persist 5 minutes after 1st dose call 911.    Atypical chest pain       prochlorperazine 10 MG tablet    COMPAZINE    30 tablet    Take 1 tablet (10 mg) by mouth every 6 hours as needed (Nausea/Vomiting)    Multiple myeloma not having achieved remission (H)

## 2017-08-16 LAB — COPATH REPORT: NORMAL

## 2017-08-21 ENCOUNTER — DOCUMENTATION ONLY (OUTPATIENT)
Dept: PHARMACY | Facility: CLINIC | Age: 82
End: 2017-08-21

## 2017-08-21 ENCOUNTER — INFUSION THERAPY VISIT (OUTPATIENT)
Dept: INFUSION THERAPY | Facility: CLINIC | Age: 82
End: 2017-08-21
Attending: INTERNAL MEDICINE
Payer: MEDICARE

## 2017-08-21 ENCOUNTER — HOSPITAL ENCOUNTER (OUTPATIENT)
Facility: CLINIC | Age: 82
Setting detail: SPECIMEN
Discharge: HOME | End: 2017-08-21
Attending: INTERNAL MEDICINE | Admitting: INTERNAL MEDICINE
Payer: MEDICARE

## 2017-08-21 VITALS
TEMPERATURE: 98.5 F | BODY MASS INDEX: 30.32 KG/M2 | HEART RATE: 67 BPM | WEIGHT: 165.8 LBS | SYSTOLIC BLOOD PRESSURE: 108 MMHG | DIASTOLIC BLOOD PRESSURE: 66 MMHG | RESPIRATION RATE: 16 BRPM

## 2017-08-21 DIAGNOSIS — C90.00 MULTIPLE MYELOMA NOT HAVING ACHIEVED REMISSION (H): Primary | ICD-10-CM

## 2017-08-21 LAB
BASOPHILS # BLD AUTO: 0 10E9/L (ref 0–0.2)
BASOPHILS NFR BLD AUTO: 0.3 %
DIFFERENTIAL METHOD BLD: ABNORMAL
EOSINOPHIL # BLD AUTO: 0.1 10E9/L (ref 0–0.7)
EOSINOPHIL NFR BLD AUTO: 2.1 %
ERYTHROCYTE [DISTWIDTH] IN BLOOD BY AUTOMATED COUNT: 14.2 % (ref 10–15)
HCT VFR BLD AUTO: 27.2 % (ref 35–47)
HGB BLD-MCNC: 9.2 G/DL (ref 11.7–15.7)
IMM GRANULOCYTES # BLD: 0 10E9/L (ref 0–0.4)
IMM GRANULOCYTES NFR BLD: 0.3 %
LYMPHOCYTES # BLD AUTO: 1.3 10E9/L (ref 0.8–5.3)
LYMPHOCYTES NFR BLD AUTO: 39.4 %
MCH RBC QN AUTO: 31.7 PG (ref 26.5–33)
MCHC RBC AUTO-ENTMCNC: 33.8 G/DL (ref 31.5–36.5)
MCV RBC AUTO: 94 FL (ref 78–100)
MONOCYTES # BLD AUTO: 0.2 10E9/L (ref 0–1.3)
MONOCYTES NFR BLD AUTO: 6.7 %
NEUTROPHILS # BLD AUTO: 1.7 10E9/L (ref 1.6–8.3)
NEUTROPHILS NFR BLD AUTO: 51.2 %
NRBC # BLD AUTO: 0 10*3/UL
NRBC BLD AUTO-RTO: 0 /100
PLATELET # BLD AUTO: 136 10E9/L (ref 150–450)
RBC # BLD AUTO: 2.9 10E12/L (ref 3.8–5.2)
WBC # BLD AUTO: 3.3 10E9/L (ref 4–11)

## 2017-08-21 PROCEDURE — 36415 COLL VENOUS BLD VENIPUNCTURE: CPT

## 2017-08-21 PROCEDURE — 25000128 H RX IP 250 OP 636: Mod: JW | Performed by: INTERNAL MEDICINE

## 2017-08-21 PROCEDURE — 85025 COMPLETE CBC W/AUTO DIFF WBC: CPT | Performed by: INTERNAL MEDICINE

## 2017-08-21 PROCEDURE — 96401 CHEMO ANTI-NEOPL SQ/IM: CPT

## 2017-08-21 RX ADMIN — BORTEZOMIB 2.7 MG: 3.5 INJECTION, POWDER, LYOPHILIZED, FOR SOLUTION INTRAVENOUS; SUBCUTANEOUS at 15:08

## 2017-08-21 ASSESSMENT — PAIN SCALES - GENERAL: PAINLEVEL: NO PAIN (0)

## 2017-08-21 NOTE — MR AVS SNAPSHOT
After Visit Summary   8/21/2017    Quiana Dunaway    MRN: [id number]           Patient Information     Date Of Birth          1935        Visit Information        Provider Department      8/21/2017 1:30 PM  INFUSION CHAIR 11 [facility]        Today's Diagnoses     Multiple myeloma not having achieved remission (H)    -  1       Follow-ups after your visit        Your next 10 appointments already scheduled     Aug 28, 2017  2:00 PM CDT   Level 1 with  INFUSION CHAIR 14   [facility] ([facility])    [facility]  [address]  [facility] [address]   [phone]            Aug 30, 2017  1:00 PM CDT   Remote PPM Check with RIOS TECH1   [facility] PHYSICIANS [facility] [place] ([facility])    [address]  [facility] [id number] [phone]           This appointment is for a remote check of your pacemaker.  This is not an appointment at the office.            Aug 31, 2017  5:30 PM CDT   Office Visit with César Chung MD   [facility]    [address] [place] [address] [phone]           Bring a current list of meds and any records pertaining to this visit. For Physicals, please bring immunization records and any forms needing to be filled out. Please arrive 10 minutes early to complete paperwork.            Sep 05, 2017  2:00 PM CDT   Level 1 with  INFUSION CHAIR 14   [facility] ([facility])    [facility]  [address]  [place] [address] [phone]            Sep 11, 2017  2:00 PM CDT   Level 1 with  INFUSION CHAIR 13   [facility] ([facility])    [facility]  [address]  [place] [address] [phone]             "Sep 11, 2017  2:30 PM CDT   Return Visit with Alex Parry MD   [facility] ([facility])    [facility] [place]  [address]  [place] [address] [phone]              Who to contact     If you have questions or need follow up information about today's clinic visit or your schedule please contact [facility] CANC[facility] AND [facility] CENTER directly at [phone].  Normal or non-critical lab and imaging results will be communicated to you by Sophia Learninghart, letter or phone within 4 business days after the clinic has received the results. If you do not hear from us within 7 days, please contact the clinic through [employer] Learning[employer] or phone. If you have a critical or abnormal lab result, we will notify you by phone as soon as possible.  Submit refill requests through Vovici or call your pharmacy and they will forward the refill request to us. Please allow 3 business days for your refill to be completed.          Additional Information About Your Visit        Vovici Information     Vovici lets you send messages to your doctor, view your test results, renew your prescriptions, schedule appointments and more. To sign up, go to [email or web address] . Click on \"Log in\" on the left side of the screen, which will take you to the Welcome page. Then click on \"Sign up Now\" on the right side of the page.     You will be asked to enter the access code listed below, as well as some personal information. Please follow the directions to create your username and password.     Your access code is: [email or web address]  Expires: 2017  8:16 PM     Your access code will  in 90 days. If you need help or a new code, please call your [place] clinic or [phone].        Care EveryWhere ID     This is your Care EveryWhere ID. This could be used by other organizations to access your [place] medical records  [id number]        Your Vitals Were     Pulse Temperature Respirations BMI " (Body Mass Index)          67 98.5  F (36.9  C) (Oral) 16 30.32 kg/m2         Blood Pressure from Last 3 Encounters:   08/21/17 108/66   08/14/17 148/79   08/14/17 148/79    Weight from Last 3 Encounters:   08/21/17 75.2 kg (165 lb 12.8 oz)   08/14/17 76 kg (167 lb 9.6 oz)   08/14/17 76 kg (167 lb 9.6 oz)              We Performed the Following     CBC with platelets differential        Primary Care Provider Office Phone # Fax #    César Chung [phone] [address] [place] [address]        Equal Access to Services     [facility]: Stacie Contreras, chance escobar, qachristy kaalmabertha pineda, phoenix rodriugez . So [facility] [phone].    ATENCIÓN: Si habla español, tiene a contreras disposición servicios gratuitos de asistencia lingüística. Llame al [phone].    We comply with applicable federal civil rights laws and [place] laws. We do not discriminate on the basis of race, color, national origin, age, disability sex, sexual orientation or gender identity.            Thank you!     Thank you for choosing [facility] CANCER CLIN[facility] AND [facility] CENTER  for your care. Our goal is always to provide you with excellent care. Hearing back from our patients is one way we can continue to improve our services. Please take a few minutes to complete the written survey that you may receive in the mail after your visit with us. Thank you!             Your Updated Medication List - Protect others around you: Learn how to safely use, store and throw away your medicines at www.disposemymeds.org.          This list is accurate as of: 8/21/17  2:58 PM.  Always use your most recent med list.                   Brand Name Dispense Instructions for use Diagnosis    acyclovir 400 MG tablet    ZOVIRAX    60 tablet    Take 1 tablet (400 mg) by mouth 2 times daily Viral Prophylaxis.    Multiple myeloma not having achieved remission (H)       aspirin 81 MG chewable tablet       Take 81 mg by mouth        calcium carbonate-vitamin D 600-400 MG-UNIT Chew     180 tablet    Take 1 chew tab by mouth 2 times daily    Multiple myeloma not having achieved remission (H)       dexamethasone 4 MG tablet    DECADRON    30 tablet    Take 10 tablets (40 mg) by mouth every 7 days for 3 doses Days 1, 8, and 15.    Multiple myeloma not having achieved remission (H)       escitalopram 5 MG tablet    LEXAPRO    90 tablet    TAKE 1 TABLET BY MOUTH DAILY    ROBER (generalized anxiety disorder)       LENalidomide 10 MG Caps capsule CHEMO    REVLIMID    14 capsule    Take 1 capsule (10 mg) by mouth daily for 14 days Days 1 through 14.    Multiple myeloma not having achieved remission (H)       lisinopril 10 MG tablet    PRINIVIL/ZESTRIL    90 tablet    Take 1 tablet (10 mg) by mouth daily    Benign essential hypertension       LORazepam 0.5 MG tablet    ATIVAN    30 tablet    Take 1 tablet (0.5 mg) by mouth every 4 hours as needed (Anxiety, Nausea/Vomiting or Sleep)    Multiple myeloma not having achieved remission (H)       nitroGLYcerin 0.4 MG sublingual tablet    NITROSTAT    25 tablet    For chest pain place 1 tablet under the tongue every 5 minutes for 3 doses. If symptoms persist 5 minutes after 1st dose call 911.    Atypical chest pain       prochlorperazine 10 MG tablet    COMPAZINE    30 tablet    Take 1 tablet (10 mg) by mouth every 6 hours as needed (Nausea/Vomiting)    Multiple myeloma not having achieved remission (H)

## 2017-08-21 NOTE — PROGRESS NOTES
Infusion Nursing Note:  Quiana Dunaway presents today for Velcade C1D8.    Patient seen by provider today: No   present during visit today: Not Applicable.    Note: N/A.    Intravenous Access:  Lab draw site left AC, Needle type Butterfly, Gauge 23.    Treatment Conditions:  Lab Results   Component Value Date    HGB 9.2 08/21/2017     Lab Results   Component Value Date    WBC 3.3 08/21/2017      Lab Results   Component Value Date    ANEU 1.7 08/21/2017     Lab Results   Component Value Date     08/21/2017      Results reviewed, labs MET treatment parameters, ok to proceed with treatment.        Post Infusion Assessment:Site patent and intact, free from redness, edema or discomfort.  No evidence of extravasations.    Discharge Plan:   Discharge instructions reviewed with: Patient.  Patient and/or family verbalized understanding of discharge instructions and all questions answered.  Copy of AVS reviewed with patient and/or family.  Patient will return 8/28/2017 for next appointment.  Patient discharged in stable condition accompanied by: self and friend.  Departure Mode: Ambulatory.    Ammy Thomas RN

## 2017-08-21 NOTE — PROGRESS NOTES
Oral Chemotherapy Monitoring Program.    Patient started Revlimid on 8/15/17. Brief check in with patient today to make sure that she is tolerating medication and doesn't have any questions about the medications.    She has been fatigued since starting the Revlimid, but states that she is just powering through and doing as much as she can. She also states that her scalp has been itchy, and more so at night time.     Spoke with Quiana and her daughter-in-law and recommended oral claritin or allegra for the itchy scalp. Both are less sedating than diphenhydramine. Explained that she could try diphenhydramine, but that it is usually more sedating and could make her a little more tired throughout the day. Caution with dizziness/drowsiness when waking up in the middle of the night to use the bathroom as well. Explained the claritin will work a little longer as well. They will try this first and then also consider a topical cream or hair wash to sooth the area as well.    Answered all questions to patient's satisfaction. Will F/U again at the beginning of the next cycle (9/5) and encouraged them to call if they had any questions in the meantime or if the claritin was not working.  Debra Matos PharmD  August 21, 2017

## 2017-08-21 NOTE — PROGRESS NOTES
Gave patient a calendar for this current cycle (C1) along with C2. These were checked for quality and accuracy by Laci Escobar pharmD.

## 2017-08-28 ENCOUNTER — HOSPITAL ENCOUNTER (OUTPATIENT)
Facility: CLINIC | Age: 82
Setting detail: SPECIMEN
Discharge: HOME | End: 2017-08-28
Attending: INTERNAL MEDICINE | Admitting: INTERNAL MEDICINE
Payer: MEDICARE

## 2017-08-28 ENCOUNTER — INFUSION THERAPY VISIT (OUTPATIENT)
Dept: INFUSION THERAPY | Facility: CLINIC | Age: 82
End: 2017-08-28
Attending: INTERNAL MEDICINE
Payer: MEDICARE

## 2017-08-28 VITALS
OXYGEN SATURATION: 99 % | HEART RATE: 86 BPM | WEIGHT: 168.6 LBS | DIASTOLIC BLOOD PRESSURE: 63 MMHG | RESPIRATION RATE: 20 BRPM | TEMPERATURE: 98 F | BODY MASS INDEX: 30.83 KG/M2 | SYSTOLIC BLOOD PRESSURE: 123 MMHG

## 2017-08-28 DIAGNOSIS — C90.00 MULTIPLE MYELOMA NOT HAVING ACHIEVED REMISSION (H): Primary | ICD-10-CM

## 2017-08-28 LAB
BASOPHILS # BLD AUTO: 0 10E9/L (ref 0–0.2)
BASOPHILS NFR BLD AUTO: 0 %
DIFFERENTIAL METHOD BLD: ABNORMAL
EOSINOPHIL # BLD AUTO: 0.1 10E9/L (ref 0–0.7)
EOSINOPHIL NFR BLD AUTO: 2.9 %
ERYTHROCYTE [DISTWIDTH] IN BLOOD BY AUTOMATED COUNT: 14.6 % (ref 10–15)
HCT VFR BLD AUTO: 28.1 % (ref 35–47)
HGB BLD-MCNC: 9.6 G/DL (ref 11.7–15.7)
IMM GRANULOCYTES # BLD: 0 10E9/L (ref 0–0.4)
IMM GRANULOCYTES NFR BLD: 0.3 %
LYMPHOCYTES # BLD AUTO: 1.2 10E9/L (ref 0.8–5.3)
LYMPHOCYTES NFR BLD AUTO: 38.7 %
MCH RBC QN AUTO: 31.9 PG (ref 26.5–33)
MCHC RBC AUTO-ENTMCNC: 34.2 G/DL (ref 31.5–36.5)
MCV RBC AUTO: 93 FL (ref 78–100)
MONOCYTES # BLD AUTO: 0.4 10E9/L (ref 0–1.3)
MONOCYTES NFR BLD AUTO: 11.4 %
NEUTROPHILS # BLD AUTO: 1.5 10E9/L (ref 1.6–8.3)
NEUTROPHILS NFR BLD AUTO: 46.7 %
NRBC # BLD AUTO: 0 10*3/UL
NRBC BLD AUTO-RTO: 0 /100
PLATELET # BLD AUTO: 121 10E9/L (ref 150–450)
RBC # BLD AUTO: 3.01 10E12/L (ref 3.8–5.2)
WBC # BLD AUTO: 3.2 10E9/L (ref 4–11)

## 2017-08-28 PROCEDURE — 96401 CHEMO ANTI-NEOPL SQ/IM: CPT

## 2017-08-28 PROCEDURE — 85025 COMPLETE CBC W/AUTO DIFF WBC: CPT | Performed by: INTERNAL MEDICINE

## 2017-08-28 PROCEDURE — 25000128 H RX IP 250 OP 636: Mod: JW | Performed by: INTERNAL MEDICINE

## 2017-08-28 RX ORDER — ALBUTEROL SULFATE 0.83 MG/ML
2.5 SOLUTION RESPIRATORY (INHALATION)
Status: CANCELLED | OUTPATIENT
Start: 2017-09-25

## 2017-08-28 RX ORDER — METHYLPREDNISOLONE SODIUM SUCCINATE 125 MG/2ML
125 INJECTION, POWDER, LYOPHILIZED, FOR SOLUTION INTRAMUSCULAR; INTRAVENOUS
Status: CANCELLED
Start: 2017-09-25

## 2017-08-28 RX ORDER — SODIUM CHLORIDE 9 MG/ML
1000 INJECTION, SOLUTION INTRAVENOUS CONTINUOUS PRN
Status: CANCELLED
Start: 2017-09-04

## 2017-08-28 RX ORDER — ALBUTEROL SULFATE 0.83 MG/ML
2.5 SOLUTION RESPIRATORY (INHALATION)
Status: CANCELLED | OUTPATIENT
Start: 2017-09-04

## 2017-08-28 RX ORDER — DEXAMETHASONE 4 MG/1
40 TABLET ORAL
Qty: 30 TABLET | Refills: 0 | Status: SHIPPED | OUTPATIENT
Start: 2017-08-28 | End: 2017-09-12

## 2017-08-28 RX ORDER — EPINEPHRINE 1 MG/ML
0.3 INJECTION INTRAMUSCULAR; INTRAVENOUS; SUBCUTANEOUS EVERY 5 MIN PRN
Status: CANCELLED | OUTPATIENT
Start: 2017-09-04

## 2017-08-28 RX ORDER — MEPERIDINE HYDROCHLORIDE 50 MG/ML
25 INJECTION INTRAMUSCULAR; INTRAVENOUS; SUBCUTANEOUS EVERY 30 MIN PRN
Status: CANCELLED | OUTPATIENT
Start: 2017-09-04

## 2017-08-28 RX ORDER — MEPERIDINE HYDROCHLORIDE 50 MG/ML
25 INJECTION INTRAMUSCULAR; INTRAVENOUS; SUBCUTANEOUS EVERY 30 MIN PRN
Status: CANCELLED | OUTPATIENT
Start: 2017-09-25

## 2017-08-28 RX ORDER — DIPHENHYDRAMINE HYDROCHLORIDE 50 MG/ML
50 INJECTION INTRAMUSCULAR; INTRAVENOUS
Status: CANCELLED
Start: 2017-09-04

## 2017-08-28 RX ORDER — EPINEPHRINE 0.3 MG/.3ML
0.3 INJECTION SUBCUTANEOUS EVERY 5 MIN PRN
Status: CANCELLED | OUTPATIENT
Start: 2017-09-18

## 2017-08-28 RX ORDER — DIPHENHYDRAMINE HYDROCHLORIDE 50 MG/ML
50 INJECTION INTRAMUSCULAR; INTRAVENOUS
Status: CANCELLED
Start: 2017-09-18

## 2017-08-28 RX ORDER — SODIUM CHLORIDE 9 MG/ML
1000 INJECTION, SOLUTION INTRAVENOUS CONTINUOUS PRN
Status: CANCELLED
Start: 2017-09-25

## 2017-08-28 RX ORDER — ALBUTEROL SULFATE 90 UG/1
1-2 AEROSOL, METERED RESPIRATORY (INHALATION)
Status: CANCELLED
Start: 2017-09-25

## 2017-08-28 RX ORDER — EPINEPHRINE 1 MG/ML
0.3 INJECTION INTRAMUSCULAR; INTRAVENOUS; SUBCUTANEOUS EVERY 5 MIN PRN
Status: CANCELLED | OUTPATIENT
Start: 2017-09-25

## 2017-08-28 RX ORDER — EPINEPHRINE 1 MG/ML
0.3 INJECTION INTRAMUSCULAR; INTRAVENOUS; SUBCUTANEOUS EVERY 5 MIN PRN
Status: CANCELLED | OUTPATIENT
Start: 2017-09-18

## 2017-08-28 RX ORDER — METHYLPREDNISOLONE SODIUM SUCCINATE 125 MG/2ML
125 INJECTION, POWDER, LYOPHILIZED, FOR SOLUTION INTRAMUSCULAR; INTRAVENOUS
Status: CANCELLED
Start: 2017-09-18

## 2017-08-28 RX ORDER — MEPERIDINE HYDROCHLORIDE 50 MG/ML
25 INJECTION INTRAMUSCULAR; INTRAVENOUS; SUBCUTANEOUS EVERY 30 MIN PRN
Status: CANCELLED | OUTPATIENT
Start: 2017-09-18

## 2017-08-28 RX ORDER — ALBUTEROL SULFATE 0.83 MG/ML
2.5 SOLUTION RESPIRATORY (INHALATION)
Status: CANCELLED | OUTPATIENT
Start: 2017-09-18

## 2017-08-28 RX ORDER — EPINEPHRINE 0.3 MG/.3ML
0.3 INJECTION SUBCUTANEOUS EVERY 5 MIN PRN
Status: CANCELLED | OUTPATIENT
Start: 2017-09-25

## 2017-08-28 RX ORDER — DIPHENHYDRAMINE HYDROCHLORIDE 50 MG/ML
50 INJECTION INTRAMUSCULAR; INTRAVENOUS
Status: CANCELLED
Start: 2017-09-25

## 2017-08-28 RX ORDER — LORAZEPAM 2 MG/ML
0.5 INJECTION INTRAMUSCULAR EVERY 4 HOURS PRN
Status: CANCELLED
Start: 2017-09-18

## 2017-08-28 RX ORDER — LORAZEPAM 2 MG/ML
0.5 INJECTION INTRAMUSCULAR EVERY 4 HOURS PRN
Status: CANCELLED
Start: 2017-09-25

## 2017-08-28 RX ORDER — EPINEPHRINE 0.3 MG/.3ML
0.3 INJECTION SUBCUTANEOUS EVERY 5 MIN PRN
Status: CANCELLED | OUTPATIENT
Start: 2017-09-04

## 2017-08-28 RX ORDER — ALBUTEROL SULFATE 90 UG/1
1-2 AEROSOL, METERED RESPIRATORY (INHALATION)
Status: CANCELLED
Start: 2017-09-18

## 2017-08-28 RX ORDER — METHYLPREDNISOLONE SODIUM SUCCINATE 125 MG/2ML
125 INJECTION, POWDER, LYOPHILIZED, FOR SOLUTION INTRAMUSCULAR; INTRAVENOUS
Status: CANCELLED
Start: 2017-09-04

## 2017-08-28 RX ORDER — LENALIDOMIDE 10 MG/1
10 CAPSULE ORAL DAILY
Qty: 14 CAPSULE | Refills: 0 | Status: SHIPPED | OUTPATIENT
Start: 2017-08-28 | End: 2018-12-26

## 2017-08-28 RX ORDER — ALBUTEROL SULFATE 90 UG/1
1-2 AEROSOL, METERED RESPIRATORY (INHALATION)
Status: CANCELLED
Start: 2017-09-04

## 2017-08-28 RX ORDER — LORAZEPAM 2 MG/ML
0.5 INJECTION INTRAMUSCULAR EVERY 4 HOURS PRN
Status: CANCELLED
Start: 2017-09-04

## 2017-08-28 RX ORDER — SODIUM CHLORIDE 9 MG/ML
1000 INJECTION, SOLUTION INTRAVENOUS CONTINUOUS PRN
Status: CANCELLED
Start: 2017-09-18

## 2017-08-28 RX ADMIN — BORTEZOMIB 2.7 MG: 3.5 INJECTION, POWDER, LYOPHILIZED, FOR SOLUTION INTRAVENOUS; SUBCUTANEOUS at 15:23

## 2017-08-28 ASSESSMENT — PAIN SCALES - GENERAL: PAINLEVEL: NO PAIN (0)

## 2017-08-28 NOTE — PROGRESS NOTES
Infusion Nursing Note:  Quiana Dunaway presents today for Cycle 1 Day 15 Velcade.    Patient seen by provider today: No   present during visit today: Not Applicable.    Note: N/A.    Intravenous Access:  No Intravenous access at this visit.    Treatment Conditions:  Lab Results   Component Value Date    HGB 9.6 08/28/2017     Lab Results   Component Value Date    WBC 3.2 08/28/2017      Lab Results   Component Value Date    ANEU 1.5 08/28/2017     Lab Results   Component Value Date     08/28/2017      Results reviewed, labs MET treatment parameters, ok to proceed with treatment.        Post Infusion Assessment:  Patient tolerated injection in upper left abdominal quadrant without incident.    Discharge Plan:   Patient received refills today for Revlimid and Decadron. [facility] Patient is aware and understanding of this plan.  Discharge instructions reviewed with: Patient and Daughter-in-Law.  Patient and family verbalized understanding of discharge instructions and all questions answered.  Copy of AVS reviewed with patient and family.  Patient will return 9/5/17 for next appointment.  Patient discharged in stable condition accompanied by: self and daughter-in-law.  Departure Mode: Ambulatory.    Isabel Bethea RN

## 2017-08-28 NOTE — MR AVS SNAPSHOT
After Visit Summary   8/28/2017    Quiana Dunaway    MRN: [id number]           Patient Information     Date Of Birth          1935        Visit Information        Provider Department      8/28/2017 2:00 PM  INFUSION CHAIR 14 [facility]        Today's Diagnoses     Multiple myeloma not having achieved remission (H)    -  1       Follow-ups after your visit        Your next 10 appointments already scheduled     Aug 30, 2017  1:00 PM CDT   Remote PPM Check with RIOS TECH1   [facility] PHYSICIANS [facility] AT [place] ([facility])    [address]  [place] [facility] [address] [phone]           This appointment is for a remote check of your pacemaker.  This is not an appointment at the office.            Aug 31, 2017  5:30 PM CDT   Office Visit with César Chung MD   [facility] ([facility])    [address] [address] [phone]           Bring a current list of meds and any records pertaining to this visit. For Physicals, please bring immunization records and any forms needing to be filled out. Please arrive 10 minutes early to complete paperwork.            Sep 05, 2017  2:00 PM CDT   Level 1 with  INFUSION CHAIR 14   [facility] ([facility])    [facility]  [address]  [place] [address] [phone]            Sep 11, 2017  2:00 PM CDT   Level 1 with  INFUSION CHAIR 13   [facility] ([facility])    [facility]  [address]  [place] [address] [phone]            Sep 11, 2017  2:30 PM CDT   Return Visit with Alex Parry MD   [facility] ([facility])    [facility]  [address]  [place] [address] [phone]              Who to contact   "   If you have questions or need follow up information about today's clinic visit or your schedule please contact [facility] AND [facility] CENTER directly at [phone].  Normal or non-critical lab and imaging results will be communicated to you by MyChart, letter or phone within 4 business days after the clinic has received the results. If you do not hear from us within 7 days, please contact the clinic through Den[employer] Medical[employer] or phone. If you have a critical or abnormal lab result, we will notify you by phone as soon as possible.  Submit refill requests through Loud3r or call your pharmacy and they will forward the refill request to us. Please allow 3 business days for your refill to be completed.          Additional Information About Your Visit        Den[employer] Medical[employer]Agorafy Information     Loud3r lets you send messages to your doctor, view your test results, renew your prescriptions, schedule appointments and more. To sign up, go to [email or web address] . Click on \"Log in\" on the left side of the screen, which will take you to the Welcome page. Then click on \"Sign up Now\" on the right side of the page.     You will be asked to enter the access code listed below, as well as some personal information. Please follow the directions to create your username and password.     Your access code is: [email or web address]  Expires: 2017  8:16 PM     Your access code will  in 90 days. If you need help or a new code, please call your [place] clinic or [phone].        Care EveryWhere ID     This is your Care EveryWhere ID. This could be used by other organizations to access your [place] medical records  [id number]        Your Vitals Were     Pulse Temperature Respirations Pulse Oximetry BMI (Body Mass Index)       86 98  F (36.7  C) (Oral) 20 99% 30.83 kg/m2        Blood Pressure from Last 3 Encounters:   17 123/63   17 108/66   17 148/79    Weight from Last 3 Encounters:   17 76.5 kg (168 lb 9.6 " oz)   08/21/17 75.2 kg (165 lb 12.8 oz)   08/14/17 76 kg (167 lb 9.6 oz)              We Performed the Following     CBC with platelets differential        Primary Care Provider Office Phone # Fax #    César Chung [phone]       [facility] [address]  [facility] [address]        Equal Access to Services     [facility]: Hadii aad ku hadasho Soomaali, waaxda luqadaha, qaybta kaalmada adeegyada, waxay idiin hayaan adeeg khysabelsh lalylyn . So [facility] [phone].    ATENCIÓN: Si ankur nascimento, tiene a contreras disposición servicios gratuitos de asistencia lingüística. [facility] [phone].    We comply with applicable federal civil rights laws and [place] laws. We do not discriminate on the basis of race, color, national origin, age, disability sex, sexual orientation or gender identity.            Thank you!     Thank you for choosing [facility]CER CLINIC AND [facility] CENTER  for your care. Our goal is always to provide you with excellent care. Hearing back from our patients is one way we can continue to improve our services. Please take a few minutes to complete the written survey that you may receive in the mail after your visit with us. Thank you!             Your Updated Medication List - Protect others around you: Learn how to safely use, store and throw away your medicines at www.disposemymeds.org.          This list is accurate as of: 8/28/17  3:28 PM.  Always use your most recent med list.                   Brand Name Dispense Instructions for use Diagnosis    acyclovir 400 MG tablet    ZOVIRAX    60 tablet    Take 1 tablet (400 mg) by mouth 2 times daily Viral Prophylaxis.    Multiple myeloma not having achieved remission (H)       aspirin 81 MG chewable tablet      Take 81 mg by mouth        calcium carbonate-vitamin D 600-400 MG-UNIT Chew     180 tablet    Take 1 chew tab by mouth 2 times daily    Multiple myeloma not having achieved remission (H)        dexamethasone 4 MG tablet    DECADRON    30 tablet    Take 10 tablets (40 mg) by mouth every 7 days for 3 doses Days 1, 8, and 15.    Multiple myeloma not having achieved remission (H)       escitalopram 5 MG tablet    LEXAPRO    90 tablet    TAKE 1 TABLET BY MOUTH DAILY    ROBER (generalized anxiety disorder)       LENalidomide 10 MG Caps capsule CHEMO    REVLIMID    14 capsule    Take 1 capsule (10 mg) by mouth daily for 14 days Days 1 through 14.    Multiple myeloma not having achieved remission (H)       lisinopril 10 MG tablet    PRINIVIL/ZESTRIL    90 tablet    Take 1 tablet (10 mg) by mouth daily    Benign essential hypertension       LORazepam 0.5 MG tablet    ATIVAN    30 tablet    Take 1 tablet (0.5 mg) by mouth every 4 hours as needed (Anxiety, Nausea/Vomiting or Sleep)    Multiple myeloma not having achieved remission (H)       nitroGLYcerin 0.4 MG sublingual tablet    NITROSTAT    25 tablet    For chest pain place 1 tablet under the tongue every 5 minutes for 3 doses. If symptoms persist 5 minutes after 1st dose call 911.    Atypical chest pain       prochlorperazine 10 MG tablet    COMPAZINE    30 tablet    Take 1 tablet (10 mg) by mouth every 6 hours as needed (Nausea/Vomiting)    Multiple myeloma not having achieved remission (H)

## 2017-08-30 ENCOUNTER — ALLIED HEALTH/NURSE VISIT (OUTPATIENT)
Dept: CARDIOLOGY | Facility: CLINIC | Age: 82
End: 2017-08-30
Payer: MEDICARE

## 2017-08-30 DIAGNOSIS — Z95.0 CARDIAC PACEMAKER IN SITU: Primary | ICD-10-CM

## 2017-08-30 PROCEDURE — 93294 REM INTERROG EVL PM/LDLS PM: CPT | Performed by: INTERNAL MEDICINE

## 2017-08-30 PROCEDURE — 93296 REM INTERROG EVL PM/IDS: CPT | Performed by: INTERNAL MEDICINE

## 2017-08-30 NOTE — MR AVS SNAPSHOT
After Visit Summary   8/30/2017    Quiana Dunaway    MRN: [id number]           Patient Information     Date Of Birth          1935        Visit Information        Provider Department      8/30/2017 1:00 PM RIOS TECH1 [facility] [place]        Today's Diagnoses     Cardiac pacemaker in situ    -  1       Follow-ups after your visit        Your next 10 appointments already scheduled     Aug 31, 2017  5:30 PM CDT   [facility]    [address] [facility] [address] [phone]           Bring a current list of meds and any records pertaining to this visit. For Physicals, please bring immunization records and any forms needing to be filled out. Please arrive 10 minutes early to complete paperwork.            Sep 05, 2017  2:00 PM CDT   Level 1 with  INFUSION CHAIR 14   [facility] ([facility])    [facility]  [address]  [facility] [address] [phone]            Sep 11, 2017  2:00 PM CDT   Level 1 with  INFUSION CHAIR 13   [facility] ([facility])    [facility]  [address]  [facility] [address] [phone]            Sep 11, 2017  2:30 PM CDT   Return Visit with Alex Parry MD   [facility] ([facility])    [facility]  [address]  [facility] [address] [phone]              Who to contact     If you have questions or need follow up information about today's clinic visit or your schedule please contact [facility] [place] directly at [phone].  Normal or non-critical lab and imaging results will be communicated to you by MyChart, letter or phone within 4 business days after the clinic has received the  "results. If you do not hear from us within 7 days, please contact the clinic through "[employer]," or phone. If you have a critical or abnormal lab result, we will notify you by phone as soon as possible.  Submit refill requests through "[employer]," or call your pharmacy and they will forward the refill request to us. Please allow 3 business days for your refill to be completed.          Additional Information About Your Visit        [employer] Information     "[employer]," lets you send messages to your doctor, view your test results, renew your prescriptions, schedule appointments and more. To sign up, go to [email or web address]," . Click on \"Log in\" on the left side of the screen, which will take you to the Welcome page. Then click on \"Sign up Now\" on the right side of the page.     You will be asked to enter the access code listed below, as well as some personal information. Please follow the directions to create your username and password.     Your access code is: [email or web address]  Expires: 2017  8:16 PM     Your access code will  in 90 days. If you need help or a new code, please call your [place] clinic or [phone].        Care EveryWhere ID     This is your Care EveryWhere ID. This could be used by other organizations to access your [place] medical records  [id number]         Blood Pressure from Last 3 Encounters:   17 123/63   17 108/66   17 148/79    Weight from Last 3 Encounters:   17 76.5 kg (168 lb 9.6 oz)   17 75.2 kg (165 lb 12.8 oz)   17 76 kg (167 lb 9.6 oz)              We Performed the Following     INTERROGATION DEVICE EVAL REMOTE, PACER/ICD (72102)     PM DEVICE INTERROGATE REMOTE (74351)        Primary Care Provider Office Phone # Fax #    César Chung [phone]       [facility] [address]  [place] [address]        Equal Access to Services     LILLIANA SANDOVAL AH: Hadii aad ku hadasho Soomaali, waaxda luqadaha, qaybta " [place] lopezdenzel rodriguez ah. So [facility] [phone].    ATENCIÓN: Si ankur nascimento, tiene a contreras disposición servicios gratuitos de asistencia lingüística. Chad al [phone].    We comply with applicable federal civil rights laws and [place] laws. We do not discriminate on the basis of race, color, national origin, age, disability sex, sexual orientation or gender identity.            Thank you!     Thank you for choosing [facility] [place]  for your care. Our goal is always to provide you with excellent care. Hearing back from our patients is one way we can continue to improve our services. Please take a few minutes to complete the written survey that you may receive in the mail after your visit with us. Thank you!             Your Updated Medication List - Protect others around you: Learn how to safely use, store and throw away your medicines at www.disposemymeds.org.          This list is accurate as of: 8/30/17 11:59 PM.  Always use your most recent med list.                   Brand Name Dispense Instructions for use Diagnosis    acyclovir 400 MG tablet    ZOVIRAX    60 tablet    Take 1 tablet (400 mg) by mouth 2 times daily Viral Prophylaxis.    Multiple myeloma not having achieved remission (H)       aspirin 81 MG chewable tablet      Take 81 mg by mouth        calcium carbonate-vitamin D 600-400 MG-UNIT Chew     180 tablet    Take 1 chew tab by mouth 2 times daily    Multiple myeloma not having achieved remission (H)       * dexamethasone 4 MG tablet    DECADRON    30 tablet    Take 10 tablets (40 mg) by mouth every 7 days for 3 doses Days 1, 8, and 15.    Multiple myeloma not having achieved remission (H)       * dexamethasone 4 MG tablet    DECADRON    30 tablet    Take 10 tablets (40 mg) by mouth every 7 days for 3 doses Days 1, 8, and 15.    Multiple myeloma not having achieved remission (H)       escitalopram 5 MG tablet    LEXAPRO     90 tablet    TAKE 1 TABLET BY MOUTH DAILY    ROBER (generalized anxiety disorder)       * LENalidomide 10 MG Caps capsule CHEMO    REVLIMID    14 capsule    Take 1 capsule (10 mg) by mouth daily for 14 days Days 1 through 14.    Multiple myeloma not having achieved remission (H)       * LENalidomide 10 MG Caps capsule CHEMO    REVLIMID    14 capsule    Take 1 capsule (10 mg) by mouth daily for 14 days Days 1 through 14.    Multiple myeloma not having achieved remission (H)       lisinopril 10 MG tablet    PRINIVIL/ZESTRIL    90 tablet    Take 1 tablet (10 mg) by mouth daily    Benign essential hypertension       LORazepam 0.5 MG tablet    ATIVAN    30 tablet    Take 1 tablet (0.5 mg) by mouth every 4 hours as needed (Anxiety, Nausea/Vomiting or Sleep)    Multiple myeloma not having achieved remission (H)       nitroGLYcerin 0.4 MG sublingual tablet    NITROSTAT    25 tablet    For chest pain place 1 tablet under the tongue every 5 minutes for 3 doses. If symptoms persist 5 minutes after 1st dose call 911.    Atypical chest pain       prochlorperazine 10 MG tablet    COMPAZINE    30 tablet    Take 1 tablet (10 mg) by mouth every 6 hours as needed (Nausea/Vomiting)    Multiple myeloma not having achieved remission (H)       * Notice:  This list has 4 medication(s) that are the same as other medications prescribed for you. Read the directions carefully, and ask your doctor or other care provider to review them with you.

## 2017-08-31 ENCOUNTER — OFFICE VISIT (OUTPATIENT)
Dept: FAMILY MEDICINE | Facility: CLINIC | Age: 82
End: 2017-08-31
Payer: MEDICARE

## 2017-08-31 ENCOUNTER — TELEPHONE (OUTPATIENT)
Dept: CARDIOLOGY | Facility: CLINIC | Age: 82
End: 2017-08-31

## 2017-08-31 VITALS
SYSTOLIC BLOOD PRESSURE: 123 MMHG | HEART RATE: 83 BPM | BODY MASS INDEX: 30.91 KG/M2 | DIASTOLIC BLOOD PRESSURE: 73 MMHG | HEIGHT: 62 IN | TEMPERATURE: 97.4 F | WEIGHT: 168 LBS | RESPIRATION RATE: 16 BRPM | OXYGEN SATURATION: 97 %

## 2017-08-31 DIAGNOSIS — I48.92 ATRIAL FLUTTER (H): Primary | ICD-10-CM

## 2017-08-31 DIAGNOSIS — C90.00 MULTIPLE MYELOMA NOT HAVING ACHIEVED REMISSION (H): Primary | ICD-10-CM

## 2017-08-31 DIAGNOSIS — I10 BENIGN ESSENTIAL HYPERTENSION: ICD-10-CM

## 2017-08-31 PROCEDURE — 99213 OFFICE O/P EST LOW 20 MIN: CPT | Performed by: INTERNAL MEDICINE

## 2017-08-31 NOTE — NURSING NOTE
"Chief Complaint   Patient presents with     RECHECK     2 month follow up       Initial /73 (BP Location: Left arm, Patient Position: Chair, Cuff Size: Adult Regular)  Pulse 83  Temp 97.4  F (36.3  C) (Oral)  Resp 16  Ht 5' 2\" (1.575 m)  Wt 168 lb (76.2 kg)  SpO2 97%  BMI 30.73 kg/m2 Estimated body mass index is 30.73 kg/(m^2) as calculated from the following:    Height as of this encounter: 5' 2\" (1.575 m).    Weight as of this encounter: 168 lb (76.2 kg).  Medication Reconciliation: complete   Caroline Ivey CMA (AAMA)      "

## 2017-08-31 NOTE — PROGRESS NOTES
[employer] Advisa (D) Remote PPM Device Check  AP: 75 % : 14 %  Mode: DDDR        Presenting Rhythm: AP/VS  Heart Rate: Adequate rates per histogram  Sensing: Stable    Pacing Threshold: Stable    Impedance: Stable  Battery Status: 8.5-10.5 years  Atrial Arrhythmia: 12 mode switch episodes comprising <1% of the time. Longest episode lasted 6 minutes 29 seconds, remaining episodes lasted <1 minute. 1 EGM shows As>Vs with 2:1 conduction for AFlutter. Ventricular rates controlled. Taking ASA only. Reviewed with SLangenbrunner,RN. Will notify Dr. Worthington with findings.     Ventricular Arrhythmia: 2 ventricular high rates. EGMs show As=Vs for PAT lasting 10-11 beats, rates 155bpm. Reviewed with SLangenbrunner,RN.       Care Plan: F/u PPM Carelink q 3 months. No answer, sent letter with results. LUIZA Bolanos

## 2017-08-31 NOTE — LETTER
[facility]  [facility]  [facility] [address]  Phone: [phone]  Fax: [phone]       Quiana,       Our office has been trying to contact you via telephone, but no answer on multiple attempts, and unable to leave a voicemail.  On your last pacemaker home transmission, you had an episode of Atrial Flutter detected, that lasted over 6 minutes. Atrial Flutter is similar to Atrial Fibrillation, so I have enclosed a booklet for your review-just know that not everything in the booklet is going to pertain to you. Dr. Worthington would like you to schedule an office visit with either him or one of the Electrophysiology Physician's Assistant or Nurse Practitioners, to discuss  A. Flutter and possible anticoagulation. I have placed an order, so you can call scheduling to schedule this appointment at [phone]. Please call the device nurse phone line at [phone] to let us know that you have received this letter and scheduled your appointment.     Thank You,    THADDEUS Weber, RN

## 2017-08-31 NOTE — PROGRESS NOTES
SUBJECTIVE:                                                    Quiana Dunaway is a 82 year old female who presents to clinic today for the following health issues:    Recent diagnosis of multiple myeloma which explains her cytopenias    She has started therapy    Oncology is planning IV bisphosphonate after dental check    She is feeling very weak and tired since starting chemotherapy    She wants an prescritpion for a wheelchair because she does not drive in a car on Saturdays (the sabbath) and she would like to attend [employer] services.  Her family can push her in a wheelchair.  She does not have any other specific complaints.      She denies chest pains.   She does get breathless when she walks but this is not worse than last visit.          Problem list and histories reviewed & adjusted, as indicated.  Additional history: as documented    Patient Active Problem List   Diagnosis     Benign essential hypertension     Pancytopenia (H)     Multiple myeloma not having achieved remission (H)     Past Surgical History:   Procedure Laterality Date     BONE MARROW BIOPSY, BONE SPECIMEN, NEEDLE/TROCAR N/A 7/12/2017    Procedure: BIOPSY BONE MARROW;  BONE MARROW BIOPSY ;  Surgeon: Grace Vela MD;  Location: [facility]       Social History   Substance Use Topics     Smoking status: Never Smoker     Smokeless tobacco: Never Used     Alcohol use No     Family History   Problem Relation Age of Onset     Unknown/Adopted Mother      Unknown/Adopted Father          Current Outpatient Prescriptions   Medication Sig Dispense Refill     order for DME Please dispense one wheel chair 1 Units 0     order for DME Dispense one 4 wheeled walker with hand brakes and seat 1 Units 0     dexamethasone (DECADRON) 4 MG tablet Take 10 tablets (40 mg) by mouth every 7 days for 3 doses Days 1, 8, and 15. 30 tablet 0     LENalidomide (REVLIMID) 10 MG CAPS capsule CHEMO Take 1 capsule (10 mg) by mouth daily for 14 days Days 1 through  "14. 14 capsule 0     LORazepam (ATIVAN) 0.5 MG tablet Take 1 tablet (0.5 mg) by mouth every 4 hours as needed (Anxiety, Nausea/Vomiting or Sleep) 30 tablet 3     prochlorperazine (COMPAZINE) 10 MG tablet Take 1 tablet (10 mg) by mouth every 6 hours as needed (Nausea/Vomiting) 30 tablet 3     acyclovir (ZOVIRAX) 400 MG tablet Take 1 tablet (400 mg) by mouth 2 times daily Viral Prophylaxis. 60 tablet 5     calcium carbonate-vitamin D 600-400 MG-UNIT CHEW Take 1 chew tab by mouth 2 times daily 180 tablet 3     escitalopram (LEXAPRO) 5 MG tablet TAKE 1 TABLET BY MOUTH DAILY 90 tablet 0     nitroglycerin (NITROSTAT) 0.4 MG sublingual tablet For chest pain place 1 tablet under the tongue every 5 minutes for 3 doses. If symptoms persist 5 minutes after 1st dose call 911. 25 tablet 0     lisinopril (PRINIVIL/ZESTRIL) 10 MG tablet Take 1 tablet (10 mg) by mouth daily 90 tablet 1     aspirin 81 MG chewable tablet Take 81 mg by mouth       No Known Allergies      ROS:  Constitutional, HEENT, cardiovascular, pulmonary, gi and gu systems are negative, except as otherwise noted.      OBJECTIVE:   /73 (BP Location: Left arm, Patient Position: Chair, Cuff Size: Adult Regular)  Pulse 83  Temp 97.4  F (36.3  C) (Oral)  Resp 16  Ht 5' 2\" (1.575 m)  Wt 168 lb (76.2 kg)  SpO2 97%  BMI 30.73 kg/m2  Body mass index is 30.73 kg/(m^2).  GEN:  Chronically ill appearing, but not toxic, speaks in full sentences  I walked her in the clinic and she kept her O2 sat > 95% with walking about 50 feet     Diagnostic Test Results:  Results for orders placed or performed in visit on 08/28/17   CBC with platelets differential   Result Value Ref Range    WBC 3.2 (L) 4.0 - 11.0 10e9/L    RBC Count 3.01 (L) 3.8 - 5.2 10e12/L    Hemoglobin 9.6 (L) 11.7 - 15.7 g/dL    Hematocrit 28.1 (L) 35.0 - 47.0 %    MCV 93 78 - 100 fl    MCH 31.9 26.5 - 33.0 pg    MCHC 34.2 31.5 - 36.5 g/dL    RDW 14.6 10.0 - 15.0 %    Platelet Count 121 (L) 150 - 450 " 10e9/L    Diff Method Automated Method     % Neutrophils 46.7 %    % Lymphocytes 38.7 %    % Monocytes 11.4 %    % Eosinophils 2.9 %    % Basophils 0.0 %    % Immature Granulocytes 0.3 %    Nucleated RBCs 0 0 /100    Absolute Neutrophil 1.5 (L) 1.6 - 8.3 10e9/L    Absolute Lymphocytes 1.2 0.8 - 5.3 10e9/L    Absolute Monocytes 0.4 0.0 - 1.3 10e9/L    Absolute Eosinophils 0.1 0.0 - 0.7 10e9/L    Absolute Basophils 0.0 0.0 - 0.2 10e9/L    Abs Immature Granulocytes 0.0 0 - 0.4 10e9/L    Absolute Nucleated RBC 0.0        ASSESSMENT/PLAN:     1. Multiple myeloma not having achieved remission (H)  Continue treatments with oncology  Hopefully exercise tolerance improves if hemoglobin improves and when chemotherapy is completed  Agree with bisphosphonate therapy after dental evaluation   I wrote her an prescritpion for a wheelchair and for a walker, she should try to use the walker for longer distances   - order for DME; Please dispense one wheel chair  Dispense: 1 Units; Refill: 0  - order for DME; Dispense one 4 wheeled walker with hand brakes and seat  Dispense: 1 Units; Refill: 0    2. Benign essential hypertension  Well controlled       FUTURE APPOINTMENTS:       - as symptoms dictate     César Chung MD  [facility]

## 2017-08-31 NOTE — MR AVS SNAPSHOT
After Visit Summary   8/31/2017    Quiana Dunaway    MRN: [id number]           Patient Information     Date Of Birth          1935        Visit Information        Provider Department      8/31/2017 5:30 PM César Chung MD [facility]        Today's Diagnoses     Multiple myeloma not having achieved remission (H)    -  1    Benign essential hypertension           Follow-ups after your visit        Your next 10 appointments already scheduled     Sep 05, 2017  2:00 PM CDT   Level 1 with  INFUSION CHAIR 14   [facility] ([facility])    [facility]  [address]  [place] [address]   [phone]            Sep 11, 2017  2:00 PM CDT   Level 1 with  INFUSION CHAIR 13   [facility] ([facility])    [facility]  [address]  [place] [address]   [phone]            Sep 11, 2017  2:30 PM CDT   Return Visit with Alex Parry MD   [facility] ([facility])    [facility]  [address]  [place] [address]   [phone]            Dec 06, 2017  2:00 PM CST   Remote PPM Check with RIOS TECH1   [facility] [place] ([facility] [place])    [address] [place] [address] [address]  [place] [address]   [phone]           This appointment is for a remote check of your pacemaker.  This is not an appointment at the office.              Who to contact     If you have questions or need follow up information about today's clinic visit or your schedule please contact [facility] directly at [phone].  Normal or non-critical lab and imaging results will be communicated to you by MyChart, letter or phone within 4 business days after the clinic has received the results. If you do not hear from us within 7 days, please contact the  "clinic through [employer] or phone. If you have a critical or abnormal lab result, we will notify you by phone as soon as possible.  Submit refill requests through [employer] or call your pharmacy and they will forward the refill request to us. Please allow 3 business days for your refill to be completed.          Additional Information About Your Visit        [employer] Information     [employer] lets you send messages to your doctor, view your test results, renew your prescriptions, schedule appointments and more. To sign up, go to [email or web address] . Click on \"Log in\" on the left side of the screen, which will take you to the Welcome page. Then click on \"Sign up Now\" on the right side of the page.     You will be asked to enter the access code listed below, as well as some personal information. Please follow the directions to create your username and password.     Your access code is: [email or web address]  Expires: 2017  8:16 PM     Your access code will  in 90 days. If you need help or a new code, please call your [place] clinic or [phone].        Care EveryWhere ID     This is your Care EveryWhere ID. This could be used by other organizations to access your [place] medical records  [id number]        Your Vitals Were     Pulse Temperature Respirations Height Pulse Oximetry BMI (Body Mass Index)    83 97.4  F (36.3  C) (Oral) 16 5' 2\" (1.575 m) 97% 30.73 kg/m2       Blood Pressure from Last 3 Encounters:   17 123/73   17 123/63   17 108/66    Weight from Last 3 Encounters:   17 168 lb (76.2 kg)   17 168 lb 9.6 oz (76.5 kg)   17 165 lb 12.8 oz (75.2 kg)              Today, you had the following     No orders found for display         Today's Medication Changes          These changes are accurate as of: 17  7:29 PM.  If you have any questions, ask your nurse or doctor.               Start taking these medicines.        Dose/Directions    * order for DME   Used for:  " Multiple myeloma not having achieved remission (H)   Started by:  César Chung MD        Please dispense one wheel chair   Quantity:  1 Units   Refills:  0       * order for DME   Used for:  Multiple myeloma not having achieved remission (H)   Started by:  César Chung MD        Dispense one 4 wheeled walker with hand brakes and seat   Quantity:  1 Units   Refills:  0       * Notice:  This list has 2 medication(s) that are the same as other medications prescribed for you. Read the directions carefully, and ask your doctor or other care provider to review them with you.         Where to get your medicines      Some of these will need a paper prescription and others can be bought over the counter.  Ask your nurse if you have questions.     Bring a paper prescription for each of these medications     order for DME    order for DME                Primary Care Provider Office Phone # Fax #    César Chung [phone]       [facility] [address] [place] [address] [address]  [facility] [address]        Equal Access to Services     LILLIANA SANDOVAL AH: Hadii althea ku hadasho Soomaali, waaxda luqadaha, qaybta kaalmada adeegyada, waxay karenin haymarnien carlos rodriguez . So [facility] [phone].    ATENCIÓN: Si habla español, tiene a contreras disposición servicios gratuitos de asistencia lingüística. Llame al [phone].    We comply with applicable federal civil rights laws and [place] laws. We do not discriminate on the basis of race, color, national origin, age, disability sex, sexual orientation or gender identity.            Thank you!     Thank you for choosing [facility]  for your care. Our goal is always to provide you with excellent care. Hearing back from our patients is one way we can continue to improve our services. Please take a few minutes to complete the written survey that you may receive in the mail after your visit with us. Thank you!             Your Updated Medication List - Protect  others around you: Learn how to safely use, store and throw away your medicines at www.disposemymeds.org.          This list is accurate as of: 8/31/17  7:29 PM.  Always use your most recent med list.                   Brand Name Dispense Instructions for use Diagnosis    acyclovir 400 MG tablet    ZOVIRAX    60 tablet    Take 1 tablet (400 mg) by mouth 2 times daily Viral Prophylaxis.    Multiple myeloma not having achieved remission (H)       aspirin 81 MG chewable tablet      Take 81 mg by mouth        calcium carbonate-vitamin D 600-400 MG-UNIT Chew     180 tablet    Take 1 chew tab by mouth 2 times daily    Multiple myeloma not having achieved remission (H)       * dexamethasone 4 MG tablet    DECADRON    30 tablet    Take 10 tablets (40 mg) by mouth every 7 days for 3 doses Days 1, 8, and 15.    Multiple myeloma not having achieved remission (H)       * dexamethasone 4 MG tablet    DECADRON    30 tablet    Take 10 tablets (40 mg) by mouth every 7 days for 3 doses Days 1, 8, and 15.    Multiple myeloma not having achieved remission (H)       escitalopram 5 MG tablet    LEXAPRO    90 tablet    TAKE 1 TABLET BY MOUTH DAILY    ROBER (generalized anxiety disorder)       * LENalidomide 10 MG Caps capsule CHEMO    REVLIMID    14 capsule    Take 1 capsule (10 mg) by mouth daily for 14 days Days 1 through 14.    Multiple myeloma not having achieved remission (H)       * LENalidomide 10 MG Caps capsule CHEMO    REVLIMID    14 capsule    Take 1 capsule (10 mg) by mouth daily for 14 days Days 1 through 14.    Multiple myeloma not having achieved remission (H)       lisinopril 10 MG tablet    PRINIVIL/ZESTRIL    90 tablet    Take 1 tablet (10 mg) by mouth daily    Benign essential hypertension       LORazepam 0.5 MG tablet    ATIVAN    30 tablet    Take 1 tablet (0.5 mg) by mouth every 4 hours as needed (Anxiety, Nausea/Vomiting or Sleep)    Multiple myeloma not having achieved remission (H)       nitroGLYcerin 0.4 MG  sublingual tablet    NITROSTAT    25 tablet    For chest pain place 1 tablet under the tongue every 5 minutes for 3 doses. If symptoms persist 5 minutes after 1st dose call 911.    Atypical chest pain       * order for DME     1 Units    Please dispense one wheel chair    Multiple myeloma not having achieved remission (H)       * order for DME     1 Units    Dispense one 4 wheeled walker with hand brakes and seat    Multiple myeloma not having achieved remission (H)       prochlorperazine 10 MG tablet    COMPAZINE    30 tablet    Take 1 tablet (10 mg) by mouth every 6 hours as needed (Nausea/Vomiting)    Multiple myeloma not having achieved remission (H)       * Notice:  This list has 6 medication(s) that are the same as other medications prescribed for you. Read the directions carefully, and ask your doctor or other care provider to review them with you.

## 2017-08-31 NOTE — TELEPHONE ENCOUNTER
Dr. Worthington,    Your patient had an episode of AFlutter on today's remote transmission. Episode lasted 6 minutes 29 seconds, ventricular rates controlled. Taking ASA only. Any changes?    [employer] Advisa (D) Remote PPM Device Check  AP: 75 % : 14 %  Mode: DDDR        Presenting Rhythm: AP/VS  Heart Rate: Adequate rates per histogram  Sensing: Stable    Pacing Threshold: Stable    Impedance: Stable  Battery Status: 8.5-10.5 years  Atrial Arrhythmia: 12 mode switch episodes comprising <1% of the time. Longest episode lasted 6 minutes 29 seconds, remaining episodes lasted <1 minute. 1 EGM shows As>Vs with 2:1 conduction for AFlutter. Ventricular rates controlled. Taking ASA only. Reviewed with SLangenbrunner,RN. Will notify Dr. Worthington with findings.     Ventricular Arrhythmia: 2 ventricular high rates. EGMs show As=Vs for PAT lasting 10-11 beats, rates 155bpm. Reviewed with SLangenbrunner,RN.       Care Plan: F/u PPM [facility] q 3 months. No answer, sent letter with results. LUIZA Bolanos

## 2017-09-04 NOTE — TELEPHONE ENCOUNTER
She should f/u in clinic with me or PA to discuss results.  I am not sure 6 min only justify OAC and pt has to help us making a decision.    Alberto Worthington

## 2017-09-05 ENCOUNTER — HOSPITAL ENCOUNTER (OUTPATIENT)
Facility: CLINIC | Age: 82
Setting detail: SPECIMEN
Discharge: HOME | End: 2017-09-05
Attending: INTERNAL MEDICINE | Admitting: INTERNAL MEDICINE
Payer: MEDICARE

## 2017-09-05 ENCOUNTER — INFUSION THERAPY VISIT (OUTPATIENT)
Dept: INFUSION THERAPY | Facility: CLINIC | Age: 82
End: 2017-09-05
Attending: INTERNAL MEDICINE
Payer: MEDICARE

## 2017-09-05 VITALS
DIASTOLIC BLOOD PRESSURE: 63 MMHG | SYSTOLIC BLOOD PRESSURE: 128 MMHG | HEART RATE: 80 BPM | BODY MASS INDEX: 30.51 KG/M2 | TEMPERATURE: 97.6 F | WEIGHT: 166.8 LBS | RESPIRATION RATE: 20 BRPM | OXYGEN SATURATION: 95 %

## 2017-09-05 DIAGNOSIS — C90.00 MULTIPLE MYELOMA NOT HAVING ACHIEVED REMISSION (H): Primary | ICD-10-CM

## 2017-09-05 LAB
ALBUMIN SERPL-MCNC: 3.7 G/DL (ref 3.4–5)
ALP SERPL-CCNC: 98 U/L (ref 40–150)
ALT SERPL W P-5'-P-CCNC: 20 U/L (ref 0–50)
ANION GAP SERPL CALCULATED.3IONS-SCNC: 5 MMOL/L (ref 3–14)
AST SERPL W P-5'-P-CCNC: 8 U/L (ref 0–45)
BASOPHILS # BLD AUTO: 0 10E9/L (ref 0–0.2)
BASOPHILS NFR BLD AUTO: 0.3 %
BILIRUB SERPL-MCNC: 0.6 MG/DL (ref 0.2–1.3)
BUN SERPL-MCNC: 24 MG/DL (ref 7–30)
CALCIUM SERPL-MCNC: 9 MG/DL (ref 8.5–10.1)
CHLORIDE SERPL-SCNC: 106 MMOL/L (ref 94–109)
CO2 SERPL-SCNC: 29 MMOL/L (ref 20–32)
CREAT SERPL-MCNC: 0.73 MG/DL (ref 0.52–1.04)
DIFFERENTIAL METHOD BLD: ABNORMAL
EOSINOPHIL # BLD AUTO: 0.1 10E9/L (ref 0–0.7)
EOSINOPHIL NFR BLD AUTO: 1.7 %
ERYTHROCYTE [DISTWIDTH] IN BLOOD BY AUTOMATED COUNT: 14.6 % (ref 10–15)
GFR SERPL CREATININE-BSD FRML MDRD: 77 ML/MIN/1.7M2
GLUCOSE SERPL-MCNC: 127 MG/DL (ref 70–99)
HCT VFR BLD AUTO: 29.7 % (ref 35–47)
HGB BLD-MCNC: 10 G/DL (ref 11.7–15.7)
IMM GRANULOCYTES # BLD: 0 10E9/L (ref 0–0.4)
IMM GRANULOCYTES NFR BLD: 0.3 %
LYMPHOCYTES # BLD AUTO: 1.1 10E9/L (ref 0.8–5.3)
LYMPHOCYTES NFR BLD AUTO: 36.9 %
MCH RBC QN AUTO: 31.3 PG (ref 26.5–33)
MCHC RBC AUTO-ENTMCNC: 33.7 G/DL (ref 31.5–36.5)
MCV RBC AUTO: 93 FL (ref 78–100)
MONOCYTES # BLD AUTO: 0.5 10E9/L (ref 0–1.3)
MONOCYTES NFR BLD AUTO: 15.6 %
NEUTROPHILS # BLD AUTO: 1.3 10E9/L (ref 1.6–8.3)
NEUTROPHILS NFR BLD AUTO: 45.2 %
NRBC # BLD AUTO: 0 10*3/UL
NRBC BLD AUTO-RTO: 0 /100
PLATELET # BLD AUTO: 133 10E9/L (ref 150–450)
POTASSIUM SERPL-SCNC: 3.5 MMOL/L (ref 3.4–5.3)
PROT SERPL-MCNC: 7.6 G/DL (ref 6.8–8.8)
RBC # BLD AUTO: 3.2 10E12/L (ref 3.8–5.2)
SODIUM SERPL-SCNC: 140 MMOL/L (ref 133–144)
WBC # BLD AUTO: 3 10E9/L (ref 4–11)

## 2017-09-05 PROCEDURE — 96401 CHEMO ANTI-NEOPL SQ/IM: CPT

## 2017-09-05 PROCEDURE — 36415 COLL VENOUS BLD VENIPUNCTURE: CPT

## 2017-09-05 PROCEDURE — 80053 COMPREHEN METABOLIC PANEL: CPT | Performed by: INTERNAL MEDICINE

## 2017-09-05 PROCEDURE — 85025 COMPLETE CBC W/AUTO DIFF WBC: CPT | Performed by: INTERNAL MEDICINE

## 2017-09-05 PROCEDURE — 25000128 H RX IP 250 OP 636: Performed by: INTERNAL MEDICINE

## 2017-09-05 RX ADMIN — BORTEZOMIB 2.7 MG: 3.5 INJECTION, POWDER, LYOPHILIZED, FOR SOLUTION INTRAVENOUS; SUBCUTANEOUS at 15:21

## 2017-09-05 ASSESSMENT — PAIN SCALES - GENERAL: PAINLEVEL: NO PAIN (0)

## 2017-09-05 NOTE — PROGRESS NOTES
Infusion Nursing Note:  Quiana Dunaway presents today for Cycle 2 Day 1 Velcade.    Patient seen by provider today: No   present during visit today: Not Applicable.    Note: Patient states that she is having very bad tooth pain. Patient stated that Dentist office would not schedule an appointment to see her until she got clearance from her Doctor. Since Dr. Parry is out this week, I spoke with Dr. Lord. After reviewing patient's labs, Dr. Lord stated that it was ok for patient to proceed with dental work and that she could take Ibuprofen for pain along with Orajel topical. This message was relayed to patient.     Intravenous Access:  No Intravenous access at this visit.    Treatment Conditions:  Lab Results   Component Value Date    HGB 10.0 09/05/2017     Lab Results   Component Value Date    WBC 3.0 09/05/2017      Lab Results   Component Value Date    ANEU 1.3 09/05/2017     Lab Results   Component Value Date     09/05/2017      Lab Results   Component Value Date     09/05/2017                   Lab Results   Component Value Date    POTASSIUM 3.5 09/05/2017           No results found for: MAG         Lab Results   Component Value Date    CR 0.73 09/05/2017                   Lab Results   Component Value Date    HUMBERTO 9.0 09/05/2017                Lab Results   Component Value Date    BILITOTAL 0.6 09/05/2017           Lab Results   Component Value Date    ALBUMIN 3.7 09/05/2017                    Lab Results   Component Value Date    ALT 20 09/05/2017           Lab Results   Component Value Date    AST 8 09/05/2017     Results reviewed, labs MET treatment parameters, ok to proceed with treatment.          Post Infusion Assessment:  Patient tolerated Velcade injection without incident.    Discharge Plan:   Patient declined prescription refills.  Discharge instructions reviewed with: Patient and Family.  Patient and family verbalized understanding of discharge instructions and all  questions answered.  Copy of AVS reviewed with patient and family.  Patient will return 9/11/17 for next appointment.  Patient discharged in stable condition accompanied by: self and daughter-in-law.  Departure Mode: Ambulatory.    Isabel Bethea RN

## 2017-09-05 NOTE — TELEPHONE ENCOUNTER
Writer attempted to call pt with Dr. Worthington's recommendations as below, but NA and no VM to leave message. THADDEUS Weber RN.

## 2017-09-05 NOTE — MR AVS SNAPSHOT
After Visit Summary   9/5/2017    Quiana Dunaway    MRN: [id number]           Patient Information     Date Of Birth          1935        Visit Information        Provider Department      9/5/2017 2:00 PM  INFUSION CHAIR 14 [facility]        Today's Diagnoses     Multiple myeloma not having achieved remission (H)    -  1       Follow-ups after your visit        Your next 10 appointments already scheduled     Sep 11, 2017  2:00 PM CDT   Level 1 with  INFUSION CHAIR 13   [facility] ([facility])    [facility] [place] [id number]  [place] [address]   [phone]            Sep 11, 2017  2:30 PM CDT   Return Visit with Alex Parry MD   [facility] ([facility])    [facility]  [address]  [place] [address]   [phone]            Dec 06, 2017  2:00 PM CST   Remote PPM Check with RIOS TECH1   [facility] AT [place] ([facility])    [address]  [place] [address] [phone]           This appointment is for a remote check of your pacemaker.  This is not an appointment at the office.              Future tests that were ordered for you today     Open Standing Orders        Priority Remaining Interval Expires Ordered    Protein electrophoresis Routine 1/1 AM DRAW  9/5/2017    IgG Routine 1/1 AM DRAW  9/5/2017    Kappa and lambda light chain Routine 1/1 AM DRAW  9/5/2017            Who to contact     If you have questions or need follow up information about today's clinic visit or your schedule please contact [facility] directly at [phone].  Normal or non-critical lab and imaging results will be communicated to you by MyChart, letter or phone within 4 business days after the clinic has received the results. If you do not hear  "from us within 7 days, please contact the clinic through [employer] or phone. If you have a critical or abnormal lab result, we will notify you by phone as soon as possible.  Submit refill requests through [employer] or call your pharmacy and they will forward the refill request to us. Please allow 3 business days for your refill to be completed.          Additional Information About Your Visit        [employer] Information     [employer] lets you send messages to your doctor, view your test results, renew your prescriptions, schedule appointments and more. To sign up, go to [email or web address] . Click on \"Log in\" on the left side of the screen, which will take you to the Welcome page. Then click on \"Sign up Now\" on the right side of the page.     You will be asked to enter the access code listed below, as well as some personal information. Please follow the directions to create your username and password.     Your access code is: [email or web address]  Expires: 2017  3:33 PM     Your access code will  in 90 days. If you need help or a new code, please call your [place] clinic or [phone].        Care EveryWhere ID     This is your Care EveryWhere ID. This could be used by other organizations to access your [place] medical records  [id number]        Your Vitals Were     Pulse Temperature Respirations Pulse Oximetry BMI (Body Mass Index)       80 97.6  F (36.4  C) (Oral) 20 95% 30.51 kg/m2        Blood Pressure from Last 3 Encounters:   17 128/63   17 123/73   17 123/63    Weight from Last 3 Encounters:   17 75.7 kg (166 lb 12.8 oz)   17 76.2 kg (168 lb)   17 76.5 kg (168 lb 9.6 oz)              We Performed the Following     CBC with platelets differential     Comprehensive metabolic panel     Protein electrophoresis timed urine        Primary Care Provider Office Phone # Fax #    César Chung [phone]       [place] [address]  [facility] [address]        Equal Access to Services     [facility] CenterTHERESA : Hadii althea quevedo samira Contreras, wajoeda luqemmyha, qarogersta yennisergiophoenix st. So [facility] [phone].    ATENCIÓN: Si habla español, tiene a contreras disposición servicios gratuitos de asistencia lingüística. Chad al [phone].    We comply with applicable federal civil rights laws and [place] laws. We do not discriminate on the basis of race, color, national origin, age, disability sex, sexual orientation or gender identity.            Thank you!     Thank you for choosing [facility]  for your care. Our goal is always to provide you with excellent care. Hearing back from our patients is one way we can continue to improve our services. Please take a few minutes to complete the written survey that you may receive in the mail after your visit with us. Thank you!             Your Updated Medication List - Protect others around you: Learn how to safely use, store and throw away your medicines at www.disposemymeds.org.          This list is accurate as of: 9/5/17  3:33 PM.  Always use your most recent med list.                   Brand Name Dispense Instructions for use Diagnosis    acyclovir 400 MG tablet    ZOVIRAX    60 tablet    Take 1 tablet (400 mg) by mouth 2 times daily Viral Prophylaxis.    Multiple myeloma not having achieved remission (H)       aspirin 81 MG chewable tablet      Take 81 mg by mouth        calcium carbonate-vitamin D 600-400 MG-UNIT Chew     180 tablet    Take 1 chew tab by mouth 2 times daily    Multiple myeloma not having achieved remission (H)       dexamethasone 4 MG tablet    DECADRON    30 tablet    Take 10 tablets (40 mg) by mouth every 7 days for 3 doses Days 1, 8, and 15.    Multiple myeloma not having achieved remission (H)       escitalopram 5 MG tablet    LEXAPRO    90 tablet    TAKE 1 TABLET BY MOUTH DAILY    ROBER (generalized anxiety disorder)        LENalidomide 10 MG Caps capsule CHEMO    REVLIMID    14 capsule    Take 1 capsule (10 mg) by mouth daily for 14 days Days 1 through 14.    Multiple myeloma not having achieved remission (H)       lisinopril 10 MG tablet    PRINIVIL/ZESTRIL    90 tablet    Take 1 tablet (10 mg) by mouth daily    Benign essential hypertension       LORazepam 0.5 MG tablet    ATIVAN    30 tablet    Take 1 tablet (0.5 mg) by mouth every 4 hours as needed (Anxiety, Nausea/Vomiting or Sleep)    Multiple myeloma not having achieved remission (H)       nitroGLYcerin 0.4 MG sublingual tablet    NITROSTAT    25 tablet    For chest pain place 1 tablet under the tongue every 5 minutes for 3 doses. If symptoms persist 5 minutes after 1st dose call 911.    Atypical chest pain       * order for DME     1 Units    Please dispense one wheel chair    Multiple myeloma not having achieved remission (H)       * order for DME     1 Units    Dispense one 4 wheeled walker with hand brakes and seat    Multiple myeloma not having achieved remission (H)       prochlorperazine 10 MG tablet    COMPAZINE    30 tablet    Take 1 tablet (10 mg) by mouth every 6 hours as needed (Nausea/Vomiting)    Multiple myeloma not having achieved remission (H)       * Notice:  This list has 2 medication(s) that are the same as other medications prescribed for you. Read the directions carefully, and ask your doctor or other care provider to review them with you.

## 2017-09-06 ENCOUNTER — DOCUMENTATION ONLY (OUTPATIENT)
Dept: PHARMACY | Facility: CLINIC | Age: 82
End: 2017-09-06

## 2017-09-06 NOTE — PROGRESS NOTES
Oral Chemotherapy Monitoring Program.    Patient currently on Revlimid therapy.    Reviewed lab results from 9/5/17.    Labs meet parameters to continue treatment.    Will touch base with patient at the upcoming appointment with ESTHER on 9/11/17    Laci Escobar PharmD  Oral Chemotherapy Program

## 2017-09-07 ENCOUNTER — TELEPHONE (OUTPATIENT)
Dept: ONCOLOGY | Facility: CLINIC | Age: 82
End: 2017-09-07

## 2017-09-07 DIAGNOSIS — K04.7 TOOTH ABSCESS: Primary | ICD-10-CM

## 2017-09-07 RX ORDER — AMOXICILLIN 500 MG/1
500 CAPSULE ORAL 3 TIMES DAILY
Qty: 30 CAPSULE | Refills: 0 | Status: SHIPPED | OUTPATIENT
Start: 2017-09-07 | End: 2017-09-26

## 2017-09-07 NOTE — TELEPHONE ENCOUNTER
Patient called concerned with tooth pain related to an abscess tooth that need to be extracted. MAURY Nevarez [phone] wanted Dr. Durant's input regarding tooth extraction if it was ok to do as an outpatient because of medication patient is on and medical history. Dr. Durant reviewed chart and requested letter with recommendations was written, signed, and faxed to [phone]. Alecia Briggs

## 2017-09-08 NOTE — TELEPHONE ENCOUNTER
I received a call from Regi the daughter in [place] to patient that she is scheduled to have tooth extraction done on Monday 9/11. Patient will hold Revlimid for tooth extraction and I will have Dr. Parry advise on when to resume therapy. I have cancelled Velcade for 9/11 next scheduled Velcade is on the 18th. Schedulers are aware to cancel 9/11 infusion appointment. . Alecia Briggs

## 2017-09-11 NOTE — TELEPHONE ENCOUNTER
Again attempted to call pt with Dr. Worthington's recommendation as below, but not answer and unable to leave VM. Will send a letter to call our clinic. Order placed for OV f/emanuel Weber RN.

## 2017-09-12 NOTE — TELEPHONE ENCOUNTER
Patient had tooth extraction and is to hold any treatment till 9/18 per Dr. Parry. I have requested patient to be scheduled for level II Velcade infusion with an exam on 9/18 and 9/25 Velcade only infusion appointment.     Once scheduled I will call patient with the appointment time details. Alecia Briggs      Patient scheduled and I called Regi [phone] and was able to review with her appointment details. Alecia Briggs

## 2017-09-14 NOTE — TELEPHONE ENCOUNTER
Noted that pt is now scheduled to see Jacqueline Webb NP for OV on 9/26/17 to discuss OAC. Will close this encounter. THADDEUS Weber RN.

## 2017-09-18 ENCOUNTER — HOSPITAL ENCOUNTER (OUTPATIENT)
Facility: CLINIC | Age: 82
Setting detail: SPECIMEN
Discharge: HOME | End: 2017-09-18
Attending: INTERNAL MEDICINE | Admitting: INTERNAL MEDICINE
Payer: MEDICARE

## 2017-09-18 ENCOUNTER — DOCUMENTATION ONLY (OUTPATIENT)
Dept: PHARMACY | Facility: CLINIC | Age: 82
End: 2017-09-18

## 2017-09-18 ENCOUNTER — ONCOLOGY VISIT (OUTPATIENT)
Dept: ONCOLOGY | Facility: CLINIC | Age: 82
End: 2017-09-18
Attending: INTERNAL MEDICINE
Payer: MEDICARE

## 2017-09-18 ENCOUNTER — INFUSION THERAPY VISIT (OUTPATIENT)
Dept: INFUSION THERAPY | Facility: CLINIC | Age: 82
End: 2017-09-18
Attending: INTERNAL MEDICINE
Payer: MEDICARE

## 2017-09-18 VITALS
HEART RATE: 93 BPM | HEIGHT: 62 IN | DIASTOLIC BLOOD PRESSURE: 80 MMHG | SYSTOLIC BLOOD PRESSURE: 141 MMHG | OXYGEN SATURATION: 95 % | RESPIRATION RATE: 18 BRPM | WEIGHT: 168.8 LBS | BODY MASS INDEX: 31.06 KG/M2 | TEMPERATURE: 98.5 F

## 2017-09-18 VITALS
WEIGHT: 168.8 LBS | DIASTOLIC BLOOD PRESSURE: 80 MMHG | RESPIRATION RATE: 18 BRPM | OXYGEN SATURATION: 95 % | HEART RATE: 93 BPM | TEMPERATURE: 98.5 F | BODY MASS INDEX: 31.06 KG/M2 | HEIGHT: 62 IN | SYSTOLIC BLOOD PRESSURE: 141 MMHG

## 2017-09-18 DIAGNOSIS — C90.00 MULTIPLE MYELOMA NOT HAVING ACHIEVED REMISSION (H): Primary | ICD-10-CM

## 2017-09-18 DIAGNOSIS — F41.1 GAD (GENERALIZED ANXIETY DISORDER): ICD-10-CM

## 2017-09-18 DIAGNOSIS — I10 BENIGN ESSENTIAL HYPERTENSION: ICD-10-CM

## 2017-09-18 LAB
BASOPHILS # BLD AUTO: 0.1 10E9/L (ref 0–0.2)
BASOPHILS NFR BLD AUTO: 1.6 %
DIFFERENTIAL METHOD BLD: ABNORMAL
EOSINOPHIL # BLD AUTO: 0 10E9/L (ref 0–0.7)
EOSINOPHIL NFR BLD AUTO: 1.3 %
ERYTHROCYTE [DISTWIDTH] IN BLOOD BY AUTOMATED COUNT: 13.9 % (ref 10–15)
HCT VFR BLD AUTO: 31.2 % (ref 35–47)
HGB BLD-MCNC: 10.5 G/DL (ref 11.7–15.7)
IMM GRANULOCYTES # BLD: 0 10E9/L (ref 0–0.4)
IMM GRANULOCYTES NFR BLD: 0.3 %
LYMPHOCYTES # BLD AUTO: 1.1 10E9/L (ref 0.8–5.3)
LYMPHOCYTES NFR BLD AUTO: 34.3 %
MCH RBC QN AUTO: 31.4 PG (ref 26.5–33)
MCHC RBC AUTO-ENTMCNC: 33.7 G/DL (ref 31.5–36.5)
MCV RBC AUTO: 93 FL (ref 78–100)
MONOCYTES # BLD AUTO: 0.4 10E9/L (ref 0–1.3)
MONOCYTES NFR BLD AUTO: 12.9 %
NEUTROPHILS # BLD AUTO: 1.6 10E9/L (ref 1.6–8.3)
NEUTROPHILS NFR BLD AUTO: 49.6 %
NRBC # BLD AUTO: 0 10*3/UL
NRBC BLD AUTO-RTO: 0 /100
PLATELET # BLD AUTO: 292 10E9/L (ref 150–450)
RBC # BLD AUTO: 3.34 10E12/L (ref 3.8–5.2)
WBC # BLD AUTO: 3.2 10E9/L (ref 4–11)

## 2017-09-18 PROCEDURE — 83883 ASSAY NEPHELOMETRY NOT SPEC: CPT | Performed by: INTERNAL MEDICINE

## 2017-09-18 PROCEDURE — 96401 CHEMO ANTI-NEOPL SQ/IM: CPT

## 2017-09-18 PROCEDURE — 99214 OFFICE O/P EST MOD 30 MIN: CPT | Performed by: INTERNAL MEDICINE

## 2017-09-18 PROCEDURE — 85025 COMPLETE CBC W/AUTO DIFF WBC: CPT | Performed by: INTERNAL MEDICINE

## 2017-09-18 PROCEDURE — 00000402 ZZHCL STATISTIC TOTAL PROTEIN: Performed by: INTERNAL MEDICINE

## 2017-09-18 PROCEDURE — 84165 PROTEIN E-PHORESIS SERUM: CPT | Performed by: INTERNAL MEDICINE

## 2017-09-18 PROCEDURE — 25000128 H RX IP 250 OP 636: Mod: JW | Performed by: INTERNAL MEDICINE

## 2017-09-18 PROCEDURE — 82784 ASSAY IGA/IGD/IGG/IGM EACH: CPT | Performed by: INTERNAL MEDICINE

## 2017-09-18 RX ORDER — ZOLEDRONIC ACID 0.04 MG/ML
4 INJECTION, SOLUTION INTRAVENOUS ONCE
Status: CANCELLED | OUTPATIENT
Start: 2017-10-18 | End: 2017-10-18

## 2017-09-18 RX ADMIN — BORTEZOMIB 2.7 MG: 3.5 INJECTION, POWDER, LYOPHILIZED, FOR SOLUTION INTRAVENOUS; SUBCUTANEOUS at 11:31

## 2017-09-18 ASSESSMENT — PAIN SCALES - GENERAL: PAINLEVEL: NO PAIN (0)

## 2017-09-18 NOTE — PROGRESS NOTES
"Oncology Rooming Note    September 18, 2017 10:18 AM   Quiana Dunaway is a 82 year old female who presents for:    Chief Complaint   Patient presents with     Oncology Clinic Visit     Multiple myeloma not having achieved remission      Initial Vitals: /80  Pulse 93  Temp 98.5  F (36.9  C) (Oral)  Resp 18  Ht 1.575 m (5' 2.01\")  SpO2 95% Estimated body mass index is 30.51 kg/(m^2) as calculated from the following:    Height as of 8/31/17: 1.575 m (5' 2\").    Weight as of 9/5/17: 75.7 kg (166 lb 12.8 oz). There is no height or weight on file to calculate BSA.  No Pain (0) Comment: Data Unavailable   No LMP recorded. Patient is postmenopausal.  Allergies reviewed: Yes  Medications reviewed: Yes    Medications: MEDICATION REFILL NEEDED ON REVLIMID   Pharmacy name entered into [employer]:    [facility] - [place], [place] - [address]     Clinical concerns:  None                  4 minutes for nursing intake (face to face time)     Isabel Sarmiento MA      DISCHARGE PLAN:  1.) Labs to be drawn by infusion RN Wes, IGG,kappa shantal light chain, protein electrophresis.   2.) Patient already scheduled for weekly Velcade.   3.) Patient to be scheduled for 3 week follow up with Dr. Parry.   Next appointments: See patient instruction section  Departure Mode: Ambulatory  Accompanied by: daughter  6 minutes for nursing discharge (face to face time)   Jodee Shankar RN          "

## 2017-09-18 NOTE — MR AVS SNAPSHOT
After Visit Summary   9/18/2017    Quiana Dunaway    MRN: [id number]           Patient Information     Date Of Birth          1935        Visit Information        Provider Department      9/18/2017 10:30 AM Alex Parry MD [facility]        Today's Diagnoses     Multiple myeloma not having achieved remission (H)    -  1      Care Instructions    Continue chemotherapy.  Follow up in 3-4 weeks.          Follow-ups after your visit        Your next 10 appointments already scheduled     Sep 25, 2017  1:30 PM CDT   Level 2 with SH INFUSION CHAIR 10   [facility] ([facility])    [facility]  [address]  [place] [address]   [phone]            Sep 26, 2017  3:00 PM CDT   [facility] EP RETURN with MARIUSZ [facility] PHYSICIANS [facility] AT [place] ([facility])    [address]  [place] [address]   [phone]            Oct 02, 2017  2:00 PM CDT   Level 2 with SH INFUSION CHAIR 17   [facility] ([facility])    [facility]  [address]  [place] [address]   [phone]            Oct 09, 2017  8:30 AM CDT   Level 2 with SH INFUSION CHAIR 6   [facility] ([facility])    [facility]  [address]  [place] [address]   [phone]            Oct 09, 2017  8:30 AM CDT   Return Visit with Alex Parry MD   [facility] ([facility])    [facility]  [address]  [place] [address]   [phone]            Oct 16, 2017  1:00 PM CDT   Level 2 with SH INFUSION CHAIR 8   [facility] ([facility])    [facility]  [address]  [place]  "[address]   [phone]            Oct 23, 2017  8:00 AM CDT   Level 2 with  INFUSION CHAIR 6   [facility] ([facility])    [facility] [place]  [address]  [place] [address]   [phone]            Dec 06, 2017  2:00 PM CST   Remote PPM Check with RIOS TECH1   [facility] PHYSICIANS HEART AT [employer] [place] ([facility])    [address]  [place] [address] [phone]           This appointment is for a remote check of your pacemaker.  This is not an appointment at the office.              Who to contact     If you have questions or need follow up information about today's clinic visit or your schedule please contact [facility] [place] directly at [phone].  Normal or non-critical lab and imaging results will be communicated to you by [employer], letter or phone within 4 business days after the clinic has received the results. If you do not hear from us within 7 days, please contact the clinic through [employer] or phone. If you have a critical or abnormal lab result, we will notify you by phone as soon as possible.  Submit refill requests through [employer] or call your pharmacy and they will forward the refill request to us. Please allow 3 business days for your refill to be completed.          Additional Information About Your Visit        [employer] Information     Move In History lets you send messages to your doctor, view your test results, renew your prescriptions, schedule appointments and more. To sign up, go to [email or web address] . Click on \"Log in\" on the left side of the screen, which will take you to the Welcome page. Then click on \"Sign up Now\" on the right side of the page.     You will be asked to enter the access code listed below, as well as some personal information. Please follow the directions to create your username and password.     Your access code is: [email or web address]  Expires: " "2017  3:33 PM     Your access code will  in 90 days. If you need help or a new code, please call your [facility] or [phone].        Care EveryWhere ID     This is your Care EveryWhere ID. This could be used by other organizations to access your [facility] medical records  [id number]        Your Vitals Were     Pulse Temperature Respirations Height Pulse Oximetry BMI (Body Mass Index)    93 98.5  F (36.9  C) (Oral) 18 1.575 m (5' 2.01\") 95% 30.87 kg/m2       Blood Pressure from Last 3 Encounters:   17 141/80   17 141/80   17 128/63    Weight from Last 3 Encounters:   17 76.6 kg (168 lb 12.8 oz)   17 76.6 kg (168 lb 12.8 oz)   17 75.7 kg (166 lb 12.8 oz)              We Performed the Following     IgG     Kappa and lambda light chain     Protein electrophoresis          Where to get your medicines      These medications were sent to [id number] - [place] - [address] AT [facility] [address]  [address], [facility] [address]    Hours:  24-hours Phone:  [phone]     escitalopram 5 MG tablet    lisinopril 10 MG tablet          Primary Care Provider Office Phone # Fax #    César JAQUEZ MD [place] [phone]       [place] [address] [address]        Equal Access to Services     KEVON SANDOVAL : Hadii aad ku hadasho Soomaali, waaxda luqadaha, qaybta kaalmada adeegyada, phoenix gee [facility] villeda. So [facility] [phone].    ATENCIÓN: Si habla español, tiene a contreras disposición servicios gratuitos de asistencia lingüística. Llame al [phone].    We comply with applicable federal civil rights laws and [place] laws. We do not discriminate on the basis of race, color, national origin, age, disability sex, sexual orientation or gender identity.            Thank you!     Thank you for choosing [facility]  for your care. Our goal is always to provide you with excellent " care. Hearing back from our patients is one way we can continue to improve our services. Please take a few minutes to complete the written survey that you may receive in the mail after your visit with us. Thank you!             Your Updated Medication List - Protect others around you: Learn how to safely use, store and throw away your medicines at www.disposemymeds.org.          This list is accurate as of: 9/18/17 11:59 PM.  Always use your most recent med list.                   Brand Name Dispense Instructions for use Diagnosis    acyclovir 400 MG tablet    ZOVIRAX    60 tablet    Take 1 tablet (400 mg) by mouth 2 times daily Viral Prophylaxis.    Multiple myeloma not having achieved remission (H)       amoxicillin 500 MG capsule    AMOXIL    30 capsule    Take 1 capsule (500 mg) by mouth 3 times daily    Tooth abscess       aspirin 81 MG chewable tablet      Take 81 mg by mouth        calcium carbonate-vitamin D 600-400 MG-UNIT Chew     180 tablet    Take 1 chew tab by mouth 2 times daily    Multiple myeloma not having achieved remission (H)       dexamethasone 4 MG tablet    DECADRON    30 tablet    Take 10 tablets (40 mg) by mouth every 7 days for 3 doses Days 1, 8, and 15.    Multiple myeloma not having achieved remission (H)       escitalopram 5 MG tablet    LEXAPRO    90 tablet    TAKE 1 TABLET BY MOUTH DAILY    ROBER (generalized anxiety disorder)       LENalidomide 10 MG Caps capsule CHEMO    REVLIMID    14 capsule    Take 1 capsule (10 mg) by mouth daily for 14 days Days 1 through 14.    Multiple myeloma not having achieved remission (H)       lisinopril 10 MG tablet    PRINIVIL/ZESTRIL    90 tablet    Take 1 tablet (10 mg) by mouth daily    Benign essential hypertension       LORazepam 0.5 MG tablet    ATIVAN    30 tablet    Take 1 tablet (0.5 mg) by mouth every 4 hours as needed (Anxiety, Nausea/Vomiting or Sleep)    Multiple myeloma not having achieved remission (H)       nitroGLYcerin 0.4 MG sublingual  tablet    NITROSTAT    25 tablet    For chest pain place 1 tablet under the tongue every 5 minutes for 3 doses. If symptoms persist 5 minutes after 1st dose call 911.    Atypical chest pain       * order for DME     1 Units    Please dispense one wheel chair    Multiple myeloma not having achieved remission (H)       * order for DME     1 Units    Dispense one 4 wheeled walker with hand brakes and seat    Multiple myeloma not having achieved remission (H)       prochlorperazine 10 MG tablet    COMPAZINE    30 tablet    Take 1 tablet (10 mg) by mouth every 6 hours as needed (Nausea/Vomiting)    Multiple myeloma not having achieved remission (H)       * Notice:  This list has 2 medication(s) that are the same as other medications prescribed for you. Read the directions carefully, and ask your doctor or other care provider to review them with you.

## 2017-09-18 NOTE — MR AVS SNAPSHOT
After Visit Summary   9/18/2017    Quiana Dunaway    MRN: [id number]           Patient Information     Date Of Birth          1935        Visit Information        Provider Department      9/18/2017 10:00 AM  INFUSION CHAIR 17 [facility]        Today's Diagnoses     Multiple myeloma not having achieved remission (H)    -  1       Follow-ups after your visit        Your next 10 appointments already scheduled     Sep 25, 2017  1:30 PM CDT   Level 2 with SH INFUSION CHAIR 10   [facility] ([facility])    [facility] [place] [id number]  [place] [address]   [phone]            Sep 26, 2017  3:00 PM CDT   [facility] EP RETURN with MARIUSZ [facility] PHYSICIANS HEART AT [place] ([facility])    [address] [place] [address] [address]  [place] [address]   [phone]            Oct 02, 2017  2:00 PM CDT   Level 2 with  INFUSION CHAIR 17   [facility] ([facility])    [facility]  [address]  [place] [address]   [phone]            Oct 09, 2017  8:30 AM CDT   Level 2 with  INFUSION CHAIR 6   [facility] ([facility])    [facility]  [address]  [place] [address]   [phone]            Oct 09, 2017  8:30 AM CDT   Return Visit with Alex Parry MD   [facility] ([facility])    [facility]  [address]  [place] [address]   [phone]            Oct 16, 2017  1:00 PM CDT   Level 2 with  INFUSION CHAIR 8   [facility] ([facility])    [facility]  [address]  [place] [address]   [phone]            Oct 23, 2017  8:00 AM CDT  "  Level 2 with  INFUSION CHAIR 6   [facility] ([facility])    [facility] [place]  [address]  [place] [address] [phone]            Dec 06, 2017  2:00 PM CST   Remote PPM Check with RIOS TECH1   [facility] PHYSICIANS HEART AT [place] ([facility])    [address]  [place] [address] [phone]           This appointment is for a remote check of your pacemaker.  This is not an appointment at the office.              Who to contact     If you have questions or need follow up information about today's clinic visit or your schedule please contact [facility] INFUSION CENTER directly at [phone].  Normal or non-critical lab and imaging results will be communicated to you by [employer], letter or phone within 4 business days after the clinic has received the results. If you do not hear from us within 7 days, please contact the clinic through [employer] or phone. If you have a critical or abnormal lab result, we will notify you by phone as soon as possible.  Submit refill requests through [employer] or call your pharmacy and they will forward the refill request to us. Please allow 3 business days for your refill to be completed.          Additional Information About Your Visit        [employer] Information     [employer] lets you send messages to your doctor, view your test results, renew your prescriptions, schedule appointments and more. To sign up, go to [email or web address] . Click on \"Log in\" on the left side of the screen, which will take you to the Welcome page. Then click on \"Sign up Now\" on the right side of the page.     You will be asked to enter the access code listed below, as well as some personal information. Please follow the directions to create your username and password.     Your access code is: [email or web address]  Expires: 12/4/2017  3:33 PM     Your access code will " " in 90 days. If you need help or a new code, please call your [facility] clinic or [phone].        Care EveryWhere ID     This is your Care EveryWhere ID. This could be used by other organizations to access your [facility] medical records  [id number]        Your Vitals Were     Pulse Temperature Respirations Height Pulse Oximetry BMI (Body Mass Index)    93 98.5  F (36.9  C) (Oral) 18 1.575 m (5' 2.01\") 95% 30.87 kg/m2       Blood Pressure from Last 3 Encounters:   17 141/80   17 141/80   17 128/63    Weight from Last 3 Encounters:   17 76.6 kg (168 lb 12.8 oz)   17 76.6 kg (168 lb 12.8 oz)   17 75.7 kg (166 lb 12.8 oz)              We Performed the Following     CBC with platelets differential        Primary Care Provider Office Phone # Fax #    César Chung [phone]       [facility] [address]  [place] [address]        Equal Access to Services     [facility]: Hadii aad ku hadasho Soomaali, waaxda luqadaha, qaybta kaalmada adeegyada, phoenix rodriguez . So [facility] [phone].    ATENCIÓN: Si habla español, tiene a contreras disposición servicios gratuitos de asistencia lingüística. Llame al [phone].    We comply with applicable federal civil rights laws and [place] laws. We do not discriminate on the basis of race, color, national origin, age, disability sex, sexual orientation or gender identity.            Thank you!     Thank you for choosing [facility] AND [facility] CENTER  for your care. Our goal is always to provide you with excellent care. Hearing back from our patients is one way we can continue to improve our services. Please take a few minutes to complete the written survey that you may receive in the mail after your visit with us. Thank you!             Your Updated Medication List - Protect others around you: Learn how to safely use, store and throw away your medicines at " www.disposemymeds.org.          This list is accurate as of: 9/18/17 12:11 PM.  Always use your most recent med list.                   Brand Name Dispense Instructions for use Diagnosis    acyclovir 400 MG tablet    ZOVIRAX    60 tablet    Take 1 tablet (400 mg) by mouth 2 times daily Viral Prophylaxis.    Multiple myeloma not having achieved remission (H)       amoxicillin 500 MG capsule    AMOXIL    30 capsule    Take 1 capsule (500 mg) by mouth 3 times daily    Tooth abscess       aspirin 81 MG chewable tablet      Take 81 mg by mouth        calcium carbonate-vitamin D 600-400 MG-UNIT Chew     180 tablet    Take 1 chew tab by mouth 2 times daily    Multiple myeloma not having achieved remission (H)       dexamethasone 4 MG tablet    DECADRON    30 tablet    Take 10 tablets (40 mg) by mouth every 7 days for 3 doses Days 1, 8, and 15.    Multiple myeloma not having achieved remission (H)       escitalopram 5 MG tablet    LEXAPRO    90 tablet    TAKE 1 TABLET BY MOUTH DAILY    ROBER (generalized anxiety disorder)       LENalidomide 10 MG Caps capsule CHEMO    REVLIMID    14 capsule    Take 1 capsule (10 mg) by mouth daily for 14 days Days 1 through 14.    Multiple myeloma not having achieved remission (H)       lisinopril 10 MG tablet    PRINIVIL/ZESTRIL    90 tablet    Take 1 tablet (10 mg) by mouth daily    Benign essential hypertension       LORazepam 0.5 MG tablet    ATIVAN    30 tablet    Take 1 tablet (0.5 mg) by mouth every 4 hours as needed (Anxiety, Nausea/Vomiting or Sleep)    Multiple myeloma not having achieved remission (H)       nitroGLYcerin 0.4 MG sublingual tablet    NITROSTAT    25 tablet    For chest pain place 1 tablet under the tongue every 5 minutes for 3 doses. If symptoms persist 5 minutes after 1st dose call 911.    Atypical chest pain       * order for DME     1 Units    Please dispense one wheel chair    Multiple myeloma not having achieved remission (H)       * order for DME     1 Units     Dispense one 4 wheeled walker with hand brakes and seat    Multiple myeloma not having achieved remission (H)       prochlorperazine 10 MG tablet    COMPAZINE    30 tablet    Take 1 tablet (10 mg) by mouth every 6 hours as needed (Nausea/Vomiting)    Multiple myeloma not having achieved remission (H)       * Notice:  This list has 2 medication(s) that are the same as other medications prescribed for you. Read the directions carefully, and ask your doctor or other care provider to review them with you.

## 2017-09-18 NOTE — PROGRESS NOTES
Infusion Nursing Note:  Quiana Dunaway presents today for C2D8 Velcade.  Treatment delayed x1 week d/t having an infected tooth extracted.  Patient has completed antibiotic therapy.      Patient seen by provider today: Yes: Dr. Parry   present during visit today: Not Applicable.    Note: N/A.    Intravenous Access:  Lab draw site right ac, Needle type BF, Gauge 23.  Labs drawn without difficulty.    Treatment Conditions:  Lab Results   Component Value Date    HGB 10.5 09/18/2017     Lab Results   Component Value Date    WBC 3.2 09/18/2017      Lab Results   Component Value Date    ANEU 1.6 09/18/2017     Lab Results   Component Value Date     09/18/2017      Results reviewed, labs MET treatment parameters, ok to proceed with treatment.        Post Infusion Assessment:  Patient tolerated injection without incident.  Site patent and intact, free from redness, edema or discomfort.  No evidence of extravasations.  Access discontinued per protocol.    Discharge Plan:   Discharge instructions reviewed with: Patient.  Patient and/or family verbalized understanding of discharge instructions and all questions answered.  Copy of AVS reviewed with patient and/or family.  Patient will return 9/25/17 for next appointment.  Patient discharged in stable condition accompanied by: friend.  Departure Mode: Ambulatory.    Wes New RN

## 2017-09-18 NOTE — PROGRESS NOTES
Oral Chemotherapy Monitoring Program    Primary Oncologist: Dr. Parry  Primary Oncology Clinic: [facility]  Cancer Diagnosis: Multiple Myeloma    Therapy History:  Started Revlimid 10mg x14 days, off x7 days on 8/15.  C2: 9/5/17 (held from 9/11-9/17 due to dental work) D8 resumed on 9/18 with both velcade AND revlimid.   C3: due 10/2/17    Drug Interaction Assessment: none    Lab Monitoring Plan  Monitoring plan: (for myeloma) Pregnancy: Pre-phase (10-14 days before therapy), within 24 hours before therapy, C1D8, C1D15, C1D22, C2D1, C3D1, C4D1, C5D1, C6D1   C1D1+   CMP, CBC, Preg C2D1+ Call, CMP, CBC, Preg C3D1+ Call, CMP, CBC, Preg C4D1+ Call, CMP, CBC, Preg C5D1+ Call, CMP, CBC, Preg C6D1+ Call, CMP, CBC, Preg   C1D8+ Preg C2D8+   C3D8+   C4D8+   C5D8+   C6D8+     C1D15+ Call, CBC, Preg C2D15+ CBC C3D15+ CBC C4D15+   C5D15+   C6D15+     C1D22+ Preg C2D22+   C3D22+   C4D22+   C5D22+   C6D22+     CBC Q2 weeks for first 3 months, then monthly  CMP monthly  Pregnancy test weekly for first month, then monthly if indicated       Subjective/Objective:  Quiana Dunaway is a 82 year old female seen in clinic for a follow-up visit for oral chemotherapy.  Spoke with Quiana and her daughter in law while in clinic today. She is doing ok. She is fatigued and is normally very active and likes working, so this bothers her some. She requires a wheel chair if they are walking for longer distances/times. She is able to manage at this time. She held her R/V/D for 7 days in the middle of the C2 due to dental work. Today she is going to resume with D8 and will take her remaining revlimid capsules. Her off week will begin in one week. Labs were WNL today.    ORAL CHEMOTHERAPY 8/14/2017 9/18/2017   Drug Name Revlimid (Lenalidomide) Revlimid (Lenalidomide)   Current Dosage 10mg 10mg   Current Schedule Daily Daily   Cycle Details 2 weeks on 1 week off 2 weeks on 1 week off   Start Date of Last Cycle 8/14/2017 9/5/2017   Planned next cycle  "start date - 10/2/2017   Doses missed in last 2 weeks - more   Adherence Assessment - Adherent   Adverse Effects - Fatigue   Any new drug interactions? - No   Is the dose as ordered appropriate for the patient? - No       Last PHQ-2 Score on record:   PHQ-2 ( 1999 [employer]) 9/20/2016   Q1: Little interest or pleasure in doing things 0   Q2: Feeling down, depressed or hopeless 0   PHQ-2 Score 0       Patient does not report depression symptoms.      Vitals:  BP:   BP Readings from Last 1 Encounters:   09/18/17 141/80     Wt Readings from Last 1 Encounters:   09/18/17 76.6 kg (168 lb 12.8 oz)     Estimated body surface area is 1.83 meters squared as calculated from the following:    Height as of an earlier encounter on 9/18/17: 1.575 m (5' 2.01\").    Weight as of an earlier encounter on 9/18/17: 76.6 kg (168 lb 12.8 oz).    Labs:  Lab Results   Component Value Date     09/05/2017      Lab Results   Component Value Date    POTASSIUM 3.5 09/05/2017     Lab Results   Component Value Date    HUMBERTO 9.0 09/05/2017     Lab Results   Component Value Date    ALBUMIN 3.7 09/05/2017     No results found for: MAG  No results found for: PHOS  Lab Results   Component Value Date    BUN 24 09/05/2017     Lab Results   Component Value Date    CR 0.73 09/05/2017       Lab Results   Component Value Date    AST 8 09/05/2017     Lab Results   Component Value Date    ALT 20 09/05/2017     Lab Results   Component Value Date    BILITOTAL 0.6 09/05/2017       Lab Results   Component Value Date    WBC 3.2 09/18/2017     Lab Results   Component Value Date    HGB 10.5 09/18/2017     Lab Results   Component Value Date     09/18/2017     Lab Results   Component Value Date    ANEU 1.6 09/18/2017           Assessment:  Patient tolerating Revlimid at this time with some increased fatigue. She would like to proceed and continue to manage fatigue.    Plan:  Restart RVD. Revlimid D8 today.     Follow-Up:  4 weeks to make sure plan is going " well.    Refill Due:  10/2/17 C3D1 start.    Debra Matos PharmD  September 18, 2017

## 2017-09-19 LAB
ALBUMIN SERPL ELPH-MCNC: 4.2 G/DL (ref 3.7–5.1)
ALPHA1 GLOB SERPL ELPH-MCNC: 0.3 G/DL (ref 0.2–0.4)
ALPHA2 GLOB SERPL ELPH-MCNC: 0.7 G/DL (ref 0.5–0.9)
B-GLOBULIN SERPL ELPH-MCNC: 0.7 G/DL (ref 0.6–1)
GAMMA GLOB SERPL ELPH-MCNC: 0.9 G/DL (ref 0.7–1.6)
IGG SERPL-MCNC: 1020 MG/DL (ref 695–1620)
KAPPA LC UR-MCNC: 14 MG/DL (ref 0.33–1.94)
KAPPA LC/LAMBDA SER: 12.17 {RATIO} (ref 0.26–1.65)
LAMBDA LC SERPL-MCNC: 1.15 MG/DL (ref 0.57–2.63)
M PROTEIN SERPL ELPH-MCNC: 0.7 G/DL
PROT PATTERN SERPL ELPH-IMP: ABNORMAL

## 2017-09-19 RX ORDER — ESCITALOPRAM OXALATE 5 MG/1
TABLET ORAL
Qty: 90 TABLET | Refills: 0 | Status: SHIPPED | OUTPATIENT
Start: 2017-09-19 | End: 2017-10-09

## 2017-09-19 RX ORDER — LISINOPRIL 10 MG/1
10 TABLET ORAL DAILY
Qty: 90 TABLET | Refills: 3 | Status: SHIPPED | OUTPATIENT
Start: 2017-09-19 | End: 2018-09-13

## 2017-09-19 NOTE — TELEPHONE ENCOUNTER
Pending Prescriptions:                       Disp   Refills    escitalopram (LEXAPRO) 5 MG tablet [Pharm*90 tab*0            Sig: TAKE 1 TABLET BY MOUTH DAILY         Last Written Prescription Date: 6/20/17  Last Fill Quantity: 90, # refills: 0  Last Office Visit with [facility] primary care provider:  8/31/17   Next 5 appointments (look out 90 days)     Oct 09, 2017  8:30 AM CDT   Return Visit with Alex Parry MD   [facility] ([facility])    [facility]  [address] [facility] [address]  [facility] [address]   [phone]                   Last PHQ-9 score on record= No flowsheet data found.

## 2017-09-19 NOTE — PROGRESS NOTES
HEMATOLOGY HISTORY: Mrs. Dunaway is a lady with multiple myeloma (IgG kappa). High risk, t(14;16).  1. She was evaluated for pancytopenia. On 05/01/2017:  -WBC of 3.4, hemoglobin of 10.2 and platelet of 154.    -CMP, Vitamin B12, folate, iron, ferritin, TSH aand haptoglobin are all normal.  2.  CT chest angiogram on 03/07/2017 did not reveal pulmonary embolism.  There is left thyroid nodule measuring 2.6 cm.  There is a 1.2 cm cystic appearing lesion in pancreatic tail.  It could be IPMN.  No splenomegaly.  3.  Colonoscopy on 04/28/2017 revealed colon polyps. Pathology revealed tubular adenoma.  No high-grade dysplasia.  4.  SPEP on 07/07/2017 reveals M-spike of 1.8.  5. Bone marrow biopsy on 07/12/2017 reveals hypercellular bone marrow with kappa monotypic plasma cell population consistent with multiple myeloma.  Bone marrow is 70% cellular.  Plasma cell is 50-60%.     -There is gain of chromosome 1, 5, 9, 15, and 17.  There is translocation 14;16.   6.  Multiple labs were done on 07/18/2017:  -M-spike of 1.9.   -Immunofixation reveals monoclonal IgG kappa.    -IgG level of 2970.  IgA of 15 and IgM of 175.    -Kappa free light chain of 67.7.  Lambda free light chain of 1.42.  Ratio of kappa to lambda of 47.71.    -Beta 2 microglobulin of 3.4.   7. PET scan on 07/24/2017 reveals several focal areas of increased FDG uptake consistent with multiple myeloma.  There is probable epithelial cyst in tail of the pancreas.  No associated abnormal FDG activity.  Left thyroid cysts or nodules without any FDG activity.  There is a 0.3 cm left upper lobe lung nodule.   8.  Velcade, Revlimid and dexamethasone started on 08/14/2017.    SUBJECTIVE:    Ms. Quiana Dunaway is an 82-year-old female with high-risk multiple myeloma.  She is currently on Velcade, Revlimid and dexamethasone since 08/14/2017. Patient did not get treatment last week as she had dental infection and needed tooth extraction.      She is no longer having any  tooth problem.  No dental pain or infection.  Denies any headache or dizziness.  No neck pain.  No chest pain or difficulty breathing.  No nausea or vomiting.  Appetite is good.  No urinary or bowel complaints.  No bleeding.      Exam:  Alert and oriented × 3.  Eyes: No icterus.    Throat: No ulcer.    Neck: No lymphadenopathy.  Nontender.  Axilla: No lymphadenopathy.  Lungs: Good air entry bilaterally.  No crackles or wheezing.  Heart: Regular.  Abdomen: Soft.  Nondistended.  Nontender.  No masses felt.  Extremities: No edema.  Skin: No petechiae.      LABORATORY DATA:  Reviewed.      ASSESSMENT:   1.  An 82-year-old female with high-risk IgG kappa multiple myeloma.   2.  Leukopenia.   3.  Anemia.   4.  Tiny lung nodule.   5.  Possible thyroid nodule or cyst.   6.  Probable epithelial cyst in the tail of pancreas.      PLAN:   1. Patient overall is doing well.  Labs were reviewed.  They are good for treatment.  She will continue on Velcade, Revlimid and dexamethasone. Side effects of treatment discussed.   2.  Plan is to start her on IV bisphosphonate.  Since she had a dental extraction, I want to wait for a month before we start bisphosphonate.   3.  Patient had a few questions which were all answered. Patient advised to see a physician if she has fever, chills, infection, worsening weakness, shortness of breath or any other concerns.  She will follow up in 1 month's time.         YVES PEREZ MD             D: 2017 17:07   T: 2017 22:42   MT: mg      Name:     DUGLAS CASTILLO   MRN:      [id number]        Account:      [id number]   :      1935           Visit Date:   2017      Document: [id number]

## 2017-09-19 NOTE — TELEPHONE ENCOUNTER
Prescription approved per [facility] Refill Protocol.  Grace LIGHT RN

## 2017-09-19 NOTE — TELEPHONE ENCOUNTER
lisinopril (PRINIVIL/ZESTRIL) 10 MG tablet      Last Written Prescription Date: 3/22/17  Last Fill Quantity: 90, # refills: 1  Last Office Visit with G, P or [facility] prescribing provider: 8/31/17  Next 5 appointments (look out 90 days)     Oct 09, 2017  8:30 AM CDT   Return Visit with Alex Parry MD   [facility] ([facility])    [facility]  [address] [facility] [address]  [facility] [address]   [phone]                   Potassium   Date Value Ref Range Status   09/05/2017 3.5 3.4 - 5.3 mmol/L Final     Creatinine   Date Value Ref Range Status   09/05/2017 0.73 0.52 - 1.04 mg/dL Final     BP Readings from Last 3 Encounters:   09/18/17 141/80   09/18/17 141/80   09/05/17 128/63           Joyce PEÑA(R)

## 2017-09-21 RX ORDER — MEPERIDINE HYDROCHLORIDE 50 MG/ML
25 INJECTION INTRAMUSCULAR; INTRAVENOUS; SUBCUTANEOUS EVERY 30 MIN PRN
Status: CANCELLED | OUTPATIENT
Start: 2017-10-22

## 2017-09-21 RX ORDER — EPINEPHRINE 0.3 MG/.3ML
0.3 INJECTION SUBCUTANEOUS EVERY 5 MIN PRN
Status: CANCELLED | OUTPATIENT
Start: 2017-10-09

## 2017-09-21 RX ORDER — ALBUTEROL SULFATE 90 UG/1
1-2 AEROSOL, METERED RESPIRATORY (INHALATION)
Status: CANCELLED
Start: 2017-10-09

## 2017-09-21 RX ORDER — DIPHENHYDRAMINE HYDROCHLORIDE 50 MG/ML
50 INJECTION INTRAMUSCULAR; INTRAVENOUS
Status: CANCELLED
Start: 2017-10-02

## 2017-09-21 RX ORDER — SODIUM CHLORIDE 9 MG/ML
1000 INJECTION, SOLUTION INTRAVENOUS CONTINUOUS PRN
Status: CANCELLED
Start: 2017-10-09

## 2017-09-21 RX ORDER — METHYLPREDNISOLONE SODIUM SUCCINATE 125 MG/2ML
125 INJECTION, POWDER, LYOPHILIZED, FOR SOLUTION INTRAMUSCULAR; INTRAVENOUS
Status: CANCELLED
Start: 2017-10-09

## 2017-09-21 RX ORDER — EPINEPHRINE 0.3 MG/.3ML
0.3 INJECTION SUBCUTANEOUS EVERY 5 MIN PRN
Status: CANCELLED | OUTPATIENT
Start: 2017-10-22

## 2017-09-21 RX ORDER — DIPHENHYDRAMINE HYDROCHLORIDE 50 MG/ML
50 INJECTION INTRAMUSCULAR; INTRAVENOUS
Status: CANCELLED
Start: 2017-10-22

## 2017-09-21 RX ORDER — SODIUM CHLORIDE 9 MG/ML
1000 INJECTION, SOLUTION INTRAVENOUS CONTINUOUS PRN
Status: CANCELLED
Start: 2017-10-02

## 2017-09-21 RX ORDER — EPINEPHRINE 1 MG/ML
0.3 INJECTION INTRAMUSCULAR; INTRAVENOUS; SUBCUTANEOUS EVERY 5 MIN PRN
Status: CANCELLED | OUTPATIENT
Start: 2017-10-22

## 2017-09-21 RX ORDER — ALBUTEROL SULFATE 0.83 MG/ML
2.5 SOLUTION RESPIRATORY (INHALATION)
Status: CANCELLED | OUTPATIENT
Start: 2017-10-02

## 2017-09-21 RX ORDER — SODIUM CHLORIDE 9 MG/ML
1000 INJECTION, SOLUTION INTRAVENOUS CONTINUOUS PRN
Status: CANCELLED
Start: 2017-10-22

## 2017-09-21 RX ORDER — LORAZEPAM 2 MG/ML
0.5 INJECTION INTRAMUSCULAR EVERY 4 HOURS PRN
Status: CANCELLED
Start: 2017-10-09

## 2017-09-21 RX ORDER — DIPHENHYDRAMINE HYDROCHLORIDE 50 MG/ML
50 INJECTION INTRAMUSCULAR; INTRAVENOUS
Status: CANCELLED
Start: 2017-10-09

## 2017-09-21 RX ORDER — EPINEPHRINE 0.3 MG/.3ML
0.3 INJECTION SUBCUTANEOUS EVERY 5 MIN PRN
Status: CANCELLED | OUTPATIENT
Start: 2017-10-02

## 2017-09-21 RX ORDER — ALBUTEROL SULFATE 90 UG/1
1-2 AEROSOL, METERED RESPIRATORY (INHALATION)
Status: CANCELLED
Start: 2017-10-22

## 2017-09-21 RX ORDER — MEPERIDINE HYDROCHLORIDE 50 MG/ML
25 INJECTION INTRAMUSCULAR; INTRAVENOUS; SUBCUTANEOUS EVERY 30 MIN PRN
Status: CANCELLED | OUTPATIENT
Start: 2017-10-02

## 2017-09-21 RX ORDER — METHYLPREDNISOLONE SODIUM SUCCINATE 125 MG/2ML
125 INJECTION, POWDER, LYOPHILIZED, FOR SOLUTION INTRAMUSCULAR; INTRAVENOUS
Status: CANCELLED
Start: 2017-10-02

## 2017-09-21 RX ORDER — ALBUTEROL SULFATE 0.83 MG/ML
2.5 SOLUTION RESPIRATORY (INHALATION)
Status: CANCELLED | OUTPATIENT
Start: 2017-10-22

## 2017-09-21 RX ORDER — EPINEPHRINE 1 MG/ML
0.3 INJECTION INTRAMUSCULAR; INTRAVENOUS; SUBCUTANEOUS EVERY 5 MIN PRN
Status: CANCELLED | OUTPATIENT
Start: 2017-10-02

## 2017-09-21 RX ORDER — LORAZEPAM 2 MG/ML
0.5 INJECTION INTRAMUSCULAR EVERY 4 HOURS PRN
Status: CANCELLED
Start: 2017-10-02

## 2017-09-21 RX ORDER — ALBUTEROL SULFATE 0.83 MG/ML
2.5 SOLUTION RESPIRATORY (INHALATION)
Status: CANCELLED | OUTPATIENT
Start: 2017-10-09

## 2017-09-21 RX ORDER — ALBUTEROL SULFATE 90 UG/1
1-2 AEROSOL, METERED RESPIRATORY (INHALATION)
Status: CANCELLED
Start: 2017-10-02

## 2017-09-21 RX ORDER — MEPERIDINE HYDROCHLORIDE 50 MG/ML
25 INJECTION INTRAMUSCULAR; INTRAVENOUS; SUBCUTANEOUS EVERY 30 MIN PRN
Status: CANCELLED | OUTPATIENT
Start: 2017-10-09

## 2017-09-21 RX ORDER — EPINEPHRINE 1 MG/ML
0.3 INJECTION INTRAMUSCULAR; INTRAVENOUS; SUBCUTANEOUS EVERY 5 MIN PRN
Status: CANCELLED | OUTPATIENT
Start: 2017-10-09

## 2017-09-21 RX ORDER — LORAZEPAM 2 MG/ML
0.5 INJECTION INTRAMUSCULAR EVERY 4 HOURS PRN
Status: CANCELLED
Start: 2017-10-22

## 2017-09-21 RX ORDER — METHYLPREDNISOLONE SODIUM SUCCINATE 125 MG/2ML
125 INJECTION, POWDER, LYOPHILIZED, FOR SOLUTION INTRAMUSCULAR; INTRAVENOUS
Status: CANCELLED
Start: 2017-10-22

## 2017-09-25 ENCOUNTER — HOSPITAL ENCOUNTER (OUTPATIENT)
Facility: CLINIC | Age: 82
Setting detail: SPECIMEN
Discharge: HOME | End: 2017-09-25
Attending: INTERNAL MEDICINE | Admitting: INTERNAL MEDICINE
Payer: MEDICARE

## 2017-09-25 ENCOUNTER — INFUSION THERAPY VISIT (OUTPATIENT)
Dept: INFUSION THERAPY | Facility: CLINIC | Age: 82
End: 2017-09-25
Attending: INTERNAL MEDICINE
Payer: MEDICARE

## 2017-09-25 VITALS
OXYGEN SATURATION: 93 % | RESPIRATION RATE: 20 BRPM | HEART RATE: 69 BPM | HEIGHT: 62 IN | WEIGHT: 167 LBS | DIASTOLIC BLOOD PRESSURE: 69 MMHG | SYSTOLIC BLOOD PRESSURE: 140 MMHG | TEMPERATURE: 98.1 F | BODY MASS INDEX: 30.73 KG/M2

## 2017-09-25 DIAGNOSIS — C90.00 MULTIPLE MYELOMA NOT HAVING ACHIEVED REMISSION (H): Primary | ICD-10-CM

## 2017-09-25 LAB
BASOPHILS # BLD AUTO: 0 10E9/L (ref 0–0.2)
BASOPHILS NFR BLD AUTO: 0.4 %
DIFFERENTIAL METHOD BLD: ABNORMAL
EOSINOPHIL # BLD AUTO: 0.1 10E9/L (ref 0–0.7)
EOSINOPHIL NFR BLD AUTO: 4.7 %
ERYTHROCYTE [DISTWIDTH] IN BLOOD BY AUTOMATED COUNT: 13.9 % (ref 10–15)
HCT VFR BLD AUTO: 30.8 % (ref 35–47)
HGB BLD-MCNC: 10.3 G/DL (ref 11.7–15.7)
IMM GRANULOCYTES # BLD: 0 10E9/L (ref 0–0.4)
IMM GRANULOCYTES NFR BLD: 0.4 %
LYMPHOCYTES # BLD AUTO: 1 10E9/L (ref 0.8–5.3)
LYMPHOCYTES NFR BLD AUTO: 37.8 %
MCH RBC QN AUTO: 30.6 PG (ref 26.5–33)
MCHC RBC AUTO-ENTMCNC: 33.4 G/DL (ref 31.5–36.5)
MCV RBC AUTO: 91 FL (ref 78–100)
MONOCYTES # BLD AUTO: 0.3 10E9/L (ref 0–1.3)
MONOCYTES NFR BLD AUTO: 10.6 %
NEUTROPHILS # BLD AUTO: 1.2 10E9/L (ref 1.6–8.3)
NEUTROPHILS NFR BLD AUTO: 46.1 %
NRBC # BLD AUTO: 0 10*3/UL
NRBC BLD AUTO-RTO: 0 /100
PLATELET # BLD AUTO: 131 10E9/L (ref 150–450)
RBC # BLD AUTO: 3.37 10E12/L (ref 3.8–5.2)
WBC # BLD AUTO: 2.5 10E9/L (ref 4–11)

## 2017-09-25 PROCEDURE — 25000128 H RX IP 250 OP 636: Mod: JW | Performed by: INTERNAL MEDICINE

## 2017-09-25 PROCEDURE — 36415 COLL VENOUS BLD VENIPUNCTURE: CPT

## 2017-09-25 PROCEDURE — 85025 COMPLETE CBC W/AUTO DIFF WBC: CPT | Performed by: INTERNAL MEDICINE

## 2017-09-25 PROCEDURE — 96401 CHEMO ANTI-NEOPL SQ/IM: CPT

## 2017-09-25 RX ORDER — LENALIDOMIDE 10 MG/1
10 CAPSULE ORAL DAILY
Qty: 14 CAPSULE | Refills: 0 | Status: SHIPPED | OUTPATIENT
Start: 2017-09-25 | End: 2018-12-26

## 2017-09-25 RX ORDER — DEXAMETHASONE 4 MG/1
40 TABLET ORAL
Qty: 30 TABLET | Refills: 0 | Status: SHIPPED | OUTPATIENT
Start: 2017-09-25 | End: 2017-10-10

## 2017-09-25 RX ADMIN — BORTEZOMIB 2.7 MG: 3.5 INJECTION, POWDER, LYOPHILIZED, FOR SOLUTION INTRAVENOUS; SUBCUTANEOUS at 15:07

## 2017-09-25 ASSESSMENT — PAIN SCALES - GENERAL: PAINLEVEL: NO PAIN (0)

## 2017-09-25 NOTE — MR AVS SNAPSHOT
After Visit Summary   9/25/2017    Quiana Dunaway    MRN: [id number]           Patient Information     Date Of Birth          1935        Visit Information        Provider Department      9/25/2017 1:30 PM SH INFUSION CHAIR 10 [facility]        Today's Diagnoses     Multiple myeloma not having achieved remission (H)    -  1       Follow-ups after your visit        Your next 10 appointments already scheduled     Sep 26, 2017  3:00 PM CDT   RUST EP RETURN with MARIUSZ Kingsley CNP   [facility] [place] ([facility])    [address]  [place] [address]   [phone]            Oct 02, 2017  2:00 PM CDT   Level 2 with SH INFUSION CHAIR 17   [facility] ([facility])    [facility]  [address]  [place] [address]   [phone]            Oct 09, 2017  8:30 AM CDT   Level 2 with SH INFUSION CHAIR 6   [facility] ([facility])    [facility]  [address]  [place] [address]   [phone]            Oct 09, 2017  8:30 AM CDT   Return Visit with Alex Parry MD   [facility] ([facility])    [facility]  [address]  [place] [address]   [phone]            Oct 16, 2017  1:00 PM CDT   Level 2 with SH INFUSION CHAIR 8   [facility] ([facility])    [facility] [place]  [address]  [place] [address]   [phone]            Oct 23, 2017  8:00 AM CDT   Level 2 with SH INFUSION CHAIR 6   [facility] ([facility])    [facility] [place]  [address]  [place] [address]   [phone]            Dec 06, 2017  2:00 PM CST  "  Remote PPM Check with RIOS TECH1   [facility] [place] ([facility])    [id number]  [place] [address] [phone]           This appointment is for a remote check of your pacemaker.  This is not an appointment at the office.              Who to contact     If you have questions or need follow up information about today's clinic visit or your schedule please contact [facility] directly at [phone].  Normal or non-critical lab and imaging results will be communicated to you by iPeenhart, letter or phone within 4 business days after the clinic has received the results. If you do not hear from us within 7 days, please contact the clinic through [employer] or phone. If you have a critical or abnormal lab result, we will notify you by phone as soon as possible.  Submit refill requests through [employer] or call your pharmacy and they will forward the refill request to us. Please allow 3 business days for your refill to be completed.          Additional Information About Your Visit        [employer] Information     [employer] lets you send messages to your doctor, view your test results, renew your prescriptions, schedule appointments and more. To sign up, go to [email or web address] . Click on \"Log in\" on the left side of the screen, which will take you to the Welcome page. Then click on \"Sign up Now\" on the right side of the page.     You will be asked to enter the access code listed below, as well as some personal information. Please follow the directions to create your username and password.     Your access code is: [email or web address]  Expires: 2017  3:33 PM     Your access code will  in 90 days. If you need help or a new code, please call your [facility] clinic or [phone].        Care EveryWhere ID     This is your Care EveryWhere ID. This could be used by other organizations to access your [facility] medical " "records  [id number]        Your Vitals Were     Pulse Temperature Respirations Height Pulse Oximetry BMI (Body Mass Index)    69 98.1  F (36.7  C) (Oral) 20 1.575 m (5' 2.01\") 93% 30.54 kg/m2       Blood Pressure from Last 3 Encounters:   09/25/17 140/69   09/18/17 141/80   09/18/17 141/80    Weight from Last 3 Encounters:   09/25/17 75.8 kg (167 lb)   09/18/17 76.6 kg (168 lb 12.8 oz)   09/18/17 76.6 kg (168 lb 12.8 oz)              We Performed the Following     CBC with platelets differential        Primary Care Provider Office Phone # Fax #    César Chung [phone]       [facility] [address]  [place] [address]        Equal Access to Services     [facility]: Hadii aad ku hadasho Soomaali, waaxda luqadaha, qaybta kaalmada adeegyada, waxay idiin hayaan carlos rodriguez . So [facility] [phone].    ATENCIÓN: Si habla español, tiene a contreras disposición servicios gratuitos de asistencia lingüística. Chad al [phone].    We comply with applicable federal civil rights laws and [place] laws. We do not discriminate on the basis of race, color, national origin, age, disability sex, sexual orientation or gender identity.            Thank you!     Thank you for choosing [facility]  for your care. Our goal is always to provide you with excellent care. Hearing back from our patients is one way we can continue to improve our services. Please take a few minutes to complete the written survey that you may receive in the mail after your visit with us. Thank you!             Your Updated Medication List - Protect others around you: Learn how to safely use, store and throw away your medicines at www.disposemymeds.org.          This list is accurate as of: 9/25/17  3:57 PM.  Always use your most recent med list.                   Brand Name Dispense Instructions for use Diagnosis    acyclovir 400 MG tablet    ZOVIRAX    60 tablet    Take 1 " tablet (400 mg) by mouth 2 times daily Viral Prophylaxis.    Multiple myeloma not having achieved remission (H)       amoxicillin 500 MG capsule    AMOXIL    30 capsule    Take 1 capsule (500 mg) by mouth 3 times daily    Tooth abscess       aspirin 81 MG chewable tablet      Take 81 mg by mouth        calcium carbonate-vitamin D 600-400 MG-UNIT Chew     180 tablet    Take 1 chew tab by mouth 2 times daily    Multiple myeloma not having achieved remission (H)       dexamethasone 4 MG tablet    DECADRON    30 tablet    Take 10 tablets (40 mg) by mouth every 7 days for 3 doses Days 1, 8, and 15.    Multiple myeloma not having achieved remission (H)       escitalopram 5 MG tablet    LEXAPRO    90 tablet    TAKE 1 TABLET BY MOUTH DAILY    ROBER (generalized anxiety disorder)       LENalidomide 10 MG Caps capsule CHEMO    REVLIMID    14 capsule    Take 1 capsule (10 mg) by mouth daily for 14 days Days 1 through 14.    Multiple myeloma not having achieved remission (H)       lisinopril 10 MG tablet    PRINIVIL/ZESTRIL    90 tablet    Take 1 tablet (10 mg) by mouth daily    Benign essential hypertension       LORazepam 0.5 MG tablet    ATIVAN    30 tablet    Take 1 tablet (0.5 mg) by mouth every 4 hours as needed (Anxiety, Nausea/Vomiting or Sleep)    Multiple myeloma not having achieved remission (H)       nitroGLYcerin 0.4 MG sublingual tablet    NITROSTAT    25 tablet    For chest pain place 1 tablet under the tongue every 5 minutes for 3 doses. If symptoms persist 5 minutes after 1st dose call 911.    Atypical chest pain       * order for DME     1 Units    Please dispense one wheel chair    Multiple myeloma not having achieved remission (H)       * order for DME     1 Units    Dispense one 4 wheeled walker with hand brakes and seat    Multiple myeloma not having achieved remission (H)       prochlorperazine 10 MG tablet    COMPAZINE    30 tablet    Take 1 tablet (10 mg) by mouth every 6 hours as needed (Nausea/Vomiting)     Multiple myeloma not having achieved remission (H)       * Notice:  This list has 2 medication(s) that are the same as other medications prescribed for you. Read the directions carefully, and ask your doctor or other care provider to review them with you.

## 2017-09-26 ENCOUNTER — OFFICE VISIT (OUTPATIENT)
Dept: CARDIOLOGY | Facility: CLINIC | Age: 82
End: 2017-09-26
Attending: INTERNAL MEDICINE
Payer: MEDICARE

## 2017-09-26 VITALS
BODY MASS INDEX: 30.91 KG/M2 | HEART RATE: 84 BPM | DIASTOLIC BLOOD PRESSURE: 60 MMHG | WEIGHT: 168 LBS | SYSTOLIC BLOOD PRESSURE: 112 MMHG | HEIGHT: 62 IN

## 2017-09-26 DIAGNOSIS — I48.92 EPISODIC ATRIAL FLUTTER (H): Primary | ICD-10-CM

## 2017-09-26 PROCEDURE — 99214 OFFICE O/P EST MOD 30 MIN: CPT | Performed by: NURSE PRACTITIONER

## 2017-09-26 NOTE — LETTER
9/26/2017    César Chung MD  [facility]   [address]  [facility] [address]    RE: Quiana Dunaway       Dear Colleague,    I had the pleasure of seeing Quiana Dunaway in the [facility].  Quiana Dunaway is a delightful 81-year-old patient presenting in clinic today.  She was first seen in our office by Dr. Worthington on 03/22/2017 regarding high degree AV block.      She had been experiencing dyspnea on exertion for the past several months.  She was evaluated by her PMD who ordered testing including a stress echo.  During the exercise portion of the stress echo, she developed second-degree heart block type 2.  She then went on to see Dr. Worthington who recommended permanent pacemaker implantation.  This went off without any complication.  She contacted our Device Clinic shortly after the pacemaker was placed, still complaining that her breathlessness had not resolved.  She had some changes made to her device to make it more sensitive in hopes that her breathlessness would resolve.  She continued to have breathlessness and we pursued a nuclear stress test and echocardiogram which did not reveal any significant abnormalities.    Recently, a device check picked up 6 minutes of atrial fibrillation.  Rate was controlled. Dr. Worthington suggested she come in to discuss this finding and the possibility of anticoagulation.    Quiana tells me today that she was recently diagnosed with multiple myeloma.  She is undergoing chemo.  She is tired but has no other specific complaints.        PHYSICAL EXAMINATION:   GENERAL:  Reveals a well-appearing elderly female in no acute distress.   HEENT:  Normocephalic, atraumatic.   NECK:  Supple without jugular venous distention or carotid bruits.   LUNGS:  Clear to auscultation bilaterally.   HEART:  Reveals an S1 and S2 with a regular rate and rhythm.  The pacemaker site shows a well-healed incision without erythema or drainage and a small  "hematoma overlying the generator.   ABDOMEN:  Soft, nontender, nondistended.   EXTREMITIES:  Lower extremities are free of any edema.      IMPRESSION AND PLAN:  Ms. Dunaway is a delightful 82-year-old who presents in clinic today.  She was recently found to have second-degree heart block type 2 and underwent permanent pacemaker implantation.     Recent device check showed 6 minutes of atrial fibrillation.  Her CHADS score is  4.  I further reviewed with Dr. Worthington.  He agrees that this is a \"gray area\" for anticoagulation given the duration of her arrhythmia.  Additionally, she has a recent diagnosis of multiple myeloma.  We decided that we would continue to monitor her arrhythmia burden via her device check.  If she has more sustained arrhythmia then anticoagulation would more likely be warranted.       SILKE RUBIN APRN, CNP         Medications Discontinued During This Encounter   Medication Reason     amoxicillin (AMOXIL) 500 MG capsule Stopped by Patient         Encounter Diagnosis   Name Primary?     Atrial flutter (H)        CURRENT MEDICATIONS:  Current Outpatient Prescriptions   Medication Sig Dispense Refill     dexamethasone (DECADRON) 4 MG tablet Take 10 tablets (40 mg) by mouth every 7 days for 3 doses Days 1, 8, and 15. 30 tablet 0     LENalidomide (REVLIMID) 10 MG CAPS capsule CHEMO Take 1 capsule (10 mg) by mouth daily for 14 days Days 1 through 14. 14 capsule 0     lisinopril (PRINIVIL/ZESTRIL) 10 MG tablet Take 1 tablet (10 mg) by mouth daily 90 tablet 3     escitalopram (LEXAPRO) 5 MG tablet TAKE 1 TABLET BY MOUTH DAILY 90 tablet 0     order for DME Please dispense one wheel chair 1 Units 0     order for DME Dispense one 4 wheeled walker with hand brakes and seat 1 Units 0     LORazepam (ATIVAN) 0.5 MG tablet Take 1 tablet (0.5 mg) by mouth every 4 hours as needed (Anxiety, Nausea/Vomiting or Sleep) 30 tablet 3     prochlorperazine (COMPAZINE) 10 MG tablet Take 1 tablet (10 mg) by mouth every " "6 hours as needed (Nausea/Vomiting) 30 tablet 3     calcium carbonate-vitamin D 600-400 MG-UNIT CHEW Take 1 chew tab by mouth 2 times daily 180 tablet 3     nitroglycerin (NITROSTAT) 0.4 MG sublingual tablet For chest pain place 1 tablet under the tongue every 5 minutes for 3 doses. If symptoms persist 5 minutes after 1st dose call 911. 25 tablet 0     aspirin 81 MG chewable tablet Take 81 mg by mouth       order for DME Wheelchair. 1 Units 0     acyclovir (ZOVIRAX) 400 MG tablet Take 1 tablet (400 mg) by mouth 2 times daily Viral Prophylaxis. 60 tablet 5       ALLERGIES   No Known Allergies    PAST MEDICAL HISTORY:  Past Medical History:   Diagnosis Date     HTN (hypertension)      Multiple myeloma not having achieved remission (H) 7/18/2017     Pacemaker      Pancytopenia (H) 6/28/2017       PAST SURGICAL HISTORY:  Past Surgical History:   Procedure Laterality Date     BONE MARROW BIOPSY, BONE SPECIMEN, NEEDLE/TROCAR N/A 7/12/2017    Procedure: BIOPSY BONE MARROW;  BONE MARROW BIOPSY ;  Surgeon: Grace Vela MD;  Location: [facility]       FAMILY HISTORY:  Family History   Problem Relation Age of Onset     Unknown/Adopted Mother      Unknown/Adopted Father        SOCIAL HISTORY:  Social History     Social History     Marital status:      Spouse name: N/A     Number of children: N/A     Years of education: N/A     Social History Main Topics     Smoking status: Never Smoker     Smokeless tobacco: Never Used     Alcohol use No     Drug use: No     Sexual activity: No     Other Topics Concern     None     Social History Narrative       Review of Systems:  Skin:  Negative       Eyes:  Negative      ENT:  Negative      Respiratory:  Positive for shortness of breath;dyspnea on exertion \"all of the time\"    Cardiovascular:    Positive for;edema;palpitations    Gastroenterology: Negative      Genitourinary:  Negative      Musculoskeletal:  Negative      Neurologic:  Positive for numbness or tingling of feet  " "  Psychiatric:  Positive for sleep disturbances chronic   Heme/Lymph/Imm:  Negative      Endocrine:  Negative        Physical Exam:  Vitals: /60  Pulse 84  Ht 1.575 m (5' 2\")  Wt 76.2 kg (168 lb)  BMI 30.73 kg/m2    Thank you for allowing me to participate in the care of your patient.    Sincerely,     Jacqueline Webb, TIN, APRN [facility]    "

## 2017-09-28 ENCOUNTER — TELEPHONE (OUTPATIENT)
Dept: ONCOLOGY | Facility: CLINIC | Age: 82
End: 2017-09-28

## 2017-09-28 ENCOUNTER — TELEPHONE (OUTPATIENT)
Dept: FAMILY MEDICINE | Facility: CLINIC | Age: 82
End: 2017-09-28

## 2017-09-28 DIAGNOSIS — R26.89 NEED FOR ASSISTANCE DUE TO UNSTEADY GAIT: Primary | ICD-10-CM

## 2017-09-28 NOTE — TELEPHONE ENCOUNTER
Reason for Call: Request for an order or referral:    Order or referral being requested:  Wheelchair order    Date needed: as soon as possible    Has the patient been seen by the PCP for this problem? YES    Additional comments: regis needs order for wheel chair w/chart notes from 8/31/2017. Please advise    Phone number Patient can be reached at:  Other phone number:      Best Time:  any    Can we leave a detailed message on this number?  YES    Call taken on 9/28/2017 at 2:06 PM by Anmol Helm

## 2017-09-28 NOTE — TELEPHONE ENCOUNTER
Received call from Pt's daughter Regi inquiring as to whether Cardiology has spoken with  or not about anti-coagulation since Pt's appt with Cardiology NP two days ago.    Checked with  & there has not yet been communication with Cardiology yet.    Called [facility] ([phone]) & spoke with NP Jacqueline Webb's RN about the above & she will give the NP 's pager # so that they can communicate directly as this NP is out-of-the-office today.    Updated  & Pt's daughter Regi.

## 2017-09-29 ENCOUNTER — TELEPHONE (OUTPATIENT)
Dept: CARDIOLOGY | Facility: CLINIC | Age: 82
End: 2017-09-29

## 2017-09-29 NOTE — TELEPHONE ENCOUNTER
"Received call from Oncology yesterday regarding plan for anticoagulation as pt had called their office with questions.  Reviewed with MICHAEL Marques and per note, \"Recent device check showed 6 minutes of atrial fibrillation.  Her CHADS score is  4.  I further reviewed with Dr. Worthington.  He agrees that this is a \"gray area\" for anticoagulation given the duration of her arrhythmia.  Additionally, she has a recent diagnosis of multiple myeloma.  We decided that we would continue to monitor her arrhythmia burden via her device check.  If she has more sustained arrhythmia then anticoagulation would more likely be warranted.\"  Called pt to review, no answer and no VM to leave message.  Alternate number was for Kya.  Attempted to call her, no answer.  LM for call back.  KMortimer RN   "

## 2017-09-29 NOTE — TELEPHONE ENCOUNTER
Order and noted faxed to [facility] per Kya's request. Fax # [phone], contact Rhoda, #[phone]

## 2017-09-29 NOTE — PROGRESS NOTES
HISTORY OF PRESENT ILLNESS:  Quiana Dunaway is a delightful 81-year-old patient presenting in clinic today.  She was first seen in our office by Dr. Worthington on 03/22/2017 regarding high degree AV block.      She had been experiencing dyspnea on exertion for the past several months.  She was evaluated by her PMD who ordered testing including a stress echo.  During the exercise portion of the stress echo, she developed second-degree heart block type 2.  She then went on to see Dr. Worthington who recommended permanent pacemaker implantation.  This went off without any complication.  She contacted our [facility] shortly after the pacemaker was placed, still complaining that her breathlessness had not resolved.  She had some changes made to her device to make it more sensitive in hopes that her breathlessness would resolve.  She continued to have breathlessness and we pursued a nuclear stress test and echocardiogram which did not reveal any significant abnormalities.    Recently, a device check picked up 6 minutes of atrial fibrillation.  Rate was controlled. Dr. Worthington suggested she come in to discuss this finding and the possibility of anticoagulation.    Quiana tells me today that she was recently diagnosed with multiple myeloma.  She is undergoing chemo.  She is tired but has no other specific complaints.        PHYSICAL EXAMINATION:   GENERAL:  Reveals a well-appearing elderly female in no acute distress.   HEENT:  Normocephalic, atraumatic.   NECK:  Supple without jugular venous distention or carotid bruits.   LUNGS:  Clear to auscultation bilaterally.   HEART:  Reveals an S1 and S2 with a regular rate and rhythm.  The pacemaker site shows a well-healed incision without erythema or drainage and a small hematoma overlying the generator.   ABDOMEN:  Soft, nontender, nondistended.   EXTREMITIES:  Lower extremities are free of any edema.      IMPRESSION AND PLAN:  Ms. Dunaway is a delightful 82-year-old who presents in  "clinic today.  She was recently found to have second-degree heart block type 2 and underwent permanent pacemaker implantation.     Recent device check showed 6 minutes of atrial fibrillation.  Her CHADS score is  4.  I further reviewed with Dr. Worthington.  He agrees that this is a \"gray area\" for anticoagulation given the duration of her arrhythmia.  Additionally, she has a recent diagnosis of multiple myeloma.  We decided that we would continue to monitor her arrhythmia burden via her device check.  If she has more sustained arrhythmia then anticoagulation would more likely be warranted.       SILKE RUBIN, APRN, CNP         Medications Discontinued During This Encounter   Medication Reason     amoxicillin (AMOXIL) 500 MG capsule Stopped by Patient         Encounter Diagnosis   Name Primary?     Atrial flutter (H)        CURRENT MEDICATIONS:  Current Outpatient Prescriptions   Medication Sig Dispense Refill     dexamethasone (DECADRON) 4 MG tablet Take 10 tablets (40 mg) by mouth every 7 days for 3 doses Days 1, 8, and 15. 30 tablet 0     LENalidomide (REVLIMID) 10 MG CAPS capsule CHEMO Take 1 capsule (10 mg) by mouth daily for 14 days Days 1 through 14. 14 capsule 0     lisinopril (PRINIVIL/ZESTRIL) 10 MG tablet Take 1 tablet (10 mg) by mouth daily 90 tablet 3     escitalopram (LEXAPRO) 5 MG tablet TAKE 1 TABLET BY MOUTH DAILY 90 tablet 0     order for DME Please dispense one wheel chair 1 Units 0     order for DME Dispense one 4 wheeled walker with hand brakes and seat 1 Units 0     LORazepam (ATIVAN) 0.5 MG tablet Take 1 tablet (0.5 mg) by mouth every 4 hours as needed (Anxiety, Nausea/Vomiting or Sleep) 30 tablet 3     prochlorperazine (COMPAZINE) 10 MG tablet Take 1 tablet (10 mg) by mouth every 6 hours as needed (Nausea/Vomiting) 30 tablet 3     calcium carbonate-vitamin D 600-400 MG-UNIT CHEW Take 1 chew tab by mouth 2 times daily 180 tablet 3     nitroglycerin (NITROSTAT) 0.4 MG sublingual tablet For chest " "pain place 1 tablet under the tongue every 5 minutes for 3 doses. If symptoms persist 5 minutes after 1st dose call 911. 25 tablet 0     aspirin 81 MG chewable tablet Take 81 mg by mouth       order for DME Wheelchair. 1 Units 0     acyclovir (ZOVIRAX) 400 MG tablet Take 1 tablet (400 mg) by mouth 2 times daily Viral Prophylaxis. 60 tablet 5       ALLERGIES   No Known Allergies    PAST MEDICAL HISTORY:  Past Medical History:   Diagnosis Date     HTN (hypertension)      Multiple myeloma not having achieved remission (H) 7/18/2017     Pacemaker      Pancytopenia (H) 6/28/2017       PAST SURGICAL HISTORY:  Past Surgical History:   Procedure Laterality Date     BONE MARROW BIOPSY, BONE SPECIMEN, NEEDLE/TROCAR N/A 7/12/2017    Procedure: BIOPSY BONE MARROW;  BONE MARROW BIOPSY ;  Surgeon: Grace Vela MD;  Location: [facility]       FAMILY HISTORY:  Family History   Problem Relation Age of Onset     Unknown/Adopted Mother      Unknown/Adopted Father        SOCIAL HISTORY:  Social History     Social History     Marital status:      Spouse name: N/A     Number of children: N/A     Years of education: N/A     Social History Main Topics     Smoking status: Never Smoker     Smokeless tobacco: Never Used     Alcohol use No     Drug use: No     Sexual activity: No     Other Topics Concern     None     Social History Narrative       Review of Systems:  Skin:  Negative       Eyes:  Negative      ENT:  Negative      Respiratory:  Positive for shortness of breath;dyspnea on exertion \"all of the time\"    Cardiovascular:    Positive for;edema;palpitations    Gastroenterology: Negative      Genitourinary:  Negative      Musculoskeletal:  Negative      Neurologic:  Positive for numbness or tingling of feet    Psychiatric:  Positive for sleep disturbances chronic   Heme/Lymph/Imm:  Negative      Endocrine:  Negative        Physical Exam:  Vitals: /60  Pulse 84  Ht 1.575 m (5' 2\")  Wt 76.2 kg (168 lb)  BMI 30.73 " kg/m2

## 2017-10-02 ENCOUNTER — INFUSION THERAPY VISIT (OUTPATIENT)
Dept: INFUSION THERAPY | Facility: CLINIC | Age: 82
End: 2017-10-02
Attending: INTERNAL MEDICINE
Payer: MEDICARE

## 2017-10-02 ENCOUNTER — HOSPITAL ENCOUNTER (OUTPATIENT)
Facility: CLINIC | Age: 82
Setting detail: SPECIMEN
Discharge: HOME | End: 2017-10-02
Attending: INTERNAL MEDICINE | Admitting: INTERNAL MEDICINE
Payer: MEDICARE

## 2017-10-02 VITALS
WEIGHT: 169.6 LBS | HEART RATE: 61 BPM | DIASTOLIC BLOOD PRESSURE: 62 MMHG | TEMPERATURE: 98 F | HEIGHT: 62 IN | RESPIRATION RATE: 18 BRPM | SYSTOLIC BLOOD PRESSURE: 127 MMHG | BODY MASS INDEX: 31.21 KG/M2

## 2017-10-02 DIAGNOSIS — E87.6 HYPOKALEMIA: ICD-10-CM

## 2017-10-02 DIAGNOSIS — C90.00 MULTIPLE MYELOMA NOT HAVING ACHIEVED REMISSION (H): Primary | ICD-10-CM

## 2017-10-02 LAB
ALBUMIN SERPL-MCNC: 3.5 G/DL (ref 3.4–5)
ALP SERPL-CCNC: 118 U/L (ref 40–150)
ALT SERPL W P-5'-P-CCNC: 17 U/L (ref 0–50)
ANION GAP SERPL CALCULATED.3IONS-SCNC: 6 MMOL/L (ref 3–14)
AST SERPL W P-5'-P-CCNC: 11 U/L (ref 0–45)
BASOPHILS # BLD AUTO: 0 10E9/L (ref 0–0.2)
BASOPHILS NFR BLD AUTO: 0.6 %
BILIRUB SERPL-MCNC: 0.4 MG/DL (ref 0.2–1.3)
BUN SERPL-MCNC: 21 MG/DL (ref 7–30)
CALCIUM SERPL-MCNC: 8.5 MG/DL (ref 8.5–10.1)
CHLORIDE SERPL-SCNC: 107 MMOL/L (ref 94–109)
CO2 SERPL-SCNC: 28 MMOL/L (ref 20–32)
CREAT SERPL-MCNC: 0.85 MG/DL (ref 0.52–1.04)
DIFFERENTIAL METHOD BLD: ABNORMAL
EOSINOPHIL # BLD AUTO: 0.3 10E9/L (ref 0–0.7)
EOSINOPHIL NFR BLD AUTO: 5.3 %
ERYTHROCYTE [DISTWIDTH] IN BLOOD BY AUTOMATED COUNT: 14 % (ref 10–15)
GFR SERPL CREATININE-BSD FRML MDRD: 64 ML/MIN/1.7M2
GLUCOSE SERPL-MCNC: 105 MG/DL (ref 70–99)
HCT VFR BLD AUTO: 31.2 % (ref 35–47)
HGB BLD-MCNC: 10.5 G/DL (ref 11.7–15.7)
IMM GRANULOCYTES # BLD: 0 10E9/L (ref 0–0.4)
IMM GRANULOCYTES NFR BLD: 0.2 %
LYMPHOCYTES # BLD AUTO: 1.5 10E9/L (ref 0.8–5.3)
LYMPHOCYTES NFR BLD AUTO: 30.8 %
MCH RBC QN AUTO: 30.7 PG (ref 26.5–33)
MCHC RBC AUTO-ENTMCNC: 33.7 G/DL (ref 31.5–36.5)
MCV RBC AUTO: 91 FL (ref 78–100)
MONOCYTES # BLD AUTO: 0.8 10E9/L (ref 0–1.3)
MONOCYTES NFR BLD AUTO: 15.9 %
NEUTROPHILS # BLD AUTO: 2.3 10E9/L (ref 1.6–8.3)
NEUTROPHILS NFR BLD AUTO: 47.2 %
NRBC # BLD AUTO: 0 10*3/UL
NRBC BLD AUTO-RTO: 0 /100
PLATELET # BLD AUTO: 142 10E9/L (ref 150–450)
POTASSIUM SERPL-SCNC: 3.3 MMOL/L (ref 3.4–5.3)
PROT SERPL-MCNC: 6.6 G/DL (ref 6.8–8.8)
RBC # BLD AUTO: 3.42 10E12/L (ref 3.8–5.2)
SODIUM SERPL-SCNC: 141 MMOL/L (ref 133–144)
WBC # BLD AUTO: 4.9 10E9/L (ref 4–11)

## 2017-10-02 PROCEDURE — 83883 ASSAY NEPHELOMETRY NOT SPEC: CPT | Performed by: INTERNAL MEDICINE

## 2017-10-02 PROCEDURE — 80053 COMPREHEN METABOLIC PANEL: CPT | Performed by: INTERNAL MEDICINE

## 2017-10-02 PROCEDURE — 96401 CHEMO ANTI-NEOPL SQ/IM: CPT

## 2017-10-02 PROCEDURE — 84165 PROTEIN E-PHORESIS SERUM: CPT | Performed by: INTERNAL MEDICINE

## 2017-10-02 PROCEDURE — 36415 COLL VENOUS BLD VENIPUNCTURE: CPT

## 2017-10-02 PROCEDURE — 85025 COMPLETE CBC W/AUTO DIFF WBC: CPT | Performed by: INTERNAL MEDICINE

## 2017-10-02 PROCEDURE — 82784 ASSAY IGA/IGD/IGG/IGM EACH: CPT | Performed by: INTERNAL MEDICINE

## 2017-10-02 PROCEDURE — 25000128 H RX IP 250 OP 636: Mod: JW | Performed by: INTERNAL MEDICINE

## 2017-10-02 PROCEDURE — 00000402 ZZHCL STATISTIC TOTAL PROTEIN: Performed by: INTERNAL MEDICINE

## 2017-10-02 RX ORDER — POTASSIUM CHLORIDE 750 MG/1
40 TABLET, EXTENDED RELEASE ORAL ONCE
Qty: 4 TABLET | Refills: 0 | Status: SHIPPED | OUTPATIENT
Start: 2017-10-02 | End: 2017-10-02

## 2017-10-02 RX ADMIN — BORTEZOMIB 2.7 MG: 3.5 INJECTION, POWDER, LYOPHILIZED, FOR SOLUTION INTRAVENOUS; SUBCUTANEOUS at 15:50

## 2017-10-02 ASSESSMENT — PAIN SCALES - GENERAL: PAINLEVEL: NO PAIN (0)

## 2017-10-02 NOTE — MR AVS SNAPSHOT
After Visit Summary   10/2/2017    Quiana Dunaway    MRN: [id number]           Patient Information     Date Of Birth          1935        Visit Information        Provider Department      10/2/2017 2:00 PM  INFUSION CHAIR 17 [facility]        Today's Diagnoses     Multiple myeloma not having achieved remission (H)    -  1    Hypokalemia           Follow-ups after your visit        Your next 10 appointments already scheduled     Oct 09, 2017 11:30 AM CDT   Level 2 with  INFUSION CHAIR 4   [facility] ([facility])    [facility] [place] [id number]  [place] [address]   [phone]            Oct 10, 2017  3:00 PM CDT   Return Visit with Alex Parry MD   [facility] ([facility])    [facility]  [address]  [place] [address]   [phone]            Oct 16, 2017  1:00 PM CDT   Level 2 with  INFUSION CHAIR 8   [facility] ([facility])    [facility]  [address]  [place] [address]   [phone]            Oct 23, 2017  8:00 AM CDT   Level 2 with  INFUSION CHAIR 6   [facility] ([facility])    [facility]  [address]  [place] [address]   [phone]            Oct 30, 2017  4:30 PM CDT   Courtesy Device Check with RIOS DCR2   [facility] [place] ([facility])    [facility]  [place] [address]   [phone]            Dec 06, 2017  2:00 PM CST   Remote PPM Check with RIOS TECH1   [facility] ([facility])    [address]  [place] [address]   [phone]           This appointment is for a remote check of your pacemaker.   "This is not an appointment at the office.              Future tests that were ordered for you today     Open Standing Orders        Priority Remaining Interval Expires Ordered    Protein electrophoresis Routine / AM DRAW  10/2/2017    IgG Routine  AM DRAW  10/2/2017    Kappa and lambda light chain Routine  AM DRAW  10/2/2017            Who to contact     If you have questions or need follow up information about today's clinic visit or your schedule please contact [facility] directly at [phone].  Normal or non-critical lab and imaging results will be communicated to you by XATAhart, letter or phone within 4 business days after the clinic has received the results. If you do not hear from us within 7 days, please contact the clinic through Inkerwangt or phone. If you have a critical or abnormal lab result, we will notify you by phone as soon as possible.  Submit refill requests through [employer] or call your pharmacy and they will forward the refill request to us. Please allow 3 business days for your refill to be completed.          Additional Information About Your Visit        XATAharArctrieval Information     [employer] lets you send messages to your doctor, view your test results, renew your prescriptions, schedule appointments and more. To sign up, go to [email or web address] . Click on \"Log in\" on the left side of the screen, which will take you to the Welcome page. Then click on \"Sign up Now\" on the right side of the page.     You will be asked to enter the access code listed below, as well as some personal information. Please follow the directions to create your username and password.     Your access code is: [email or web address]  Expires: 2017  3:33 PM     Your access code will  in 90 days. If you need help or a new code, please call your [place] clinic or [phone].        Care EveryWhere ID     This is your Care EveryWhere ID. This could be used by other organizations to " "access your [place] medical records  [id number]        Your Vitals Were     Pulse Temperature Respirations Height BMI (Body Mass Index)       61 98  F (36.7  C) (Oral) 18 1.575 m (5' 2.01\") 31.01 kg/m2        Blood Pressure from Last 3 Encounters:   10/02/17 127/62   09/26/17 112/60   09/25/17 140/69    Weight from Last 3 Encounters:   10/02/17 76.9 kg (169 lb 9.6 oz)   09/26/17 76.2 kg (168 lb)   09/25/17 75.8 kg (167 lb)              We Performed the Following     CBC with platelets differential     Comprehensive metabolic panel     IgG     Kappa and lambda light chain     Protein electrophoresis          Today's Medication Changes          These changes are accurate as of: 10/2/17  5:03 PM.  If you have any questions, ask your nurse or doctor.               Start taking these medicines.        Dose/Directions    potassium chloride 10 MEQ tablet   Commonly known as:  K-TAB,KLOR-CON   Used for:  Hypokalemia        Dose:  40 mEq   Take 4 tablets (40 mEq) by mouth once for 1 dose   Quantity:  4 tablet   Refills:  0            Where to get your medicines      These medications were sent to [facility] - [place], [place] - [address]  [address], [facility] [address]     Phone:  [phone]     potassium chloride 10 MEQ tablet                Primary Care Provider Office Phone # Fax #    César Chung [phone]       [facility] [place] [address]  [facility] [address]        Equal Access to Services     KEVON SANDOVAL : Hadii aad ku hadasho Soomaali, waaxda luqadaha, qaybta kaalmada adeegyada, waxay idiin hayaan adedenzel villeda. So [facility] [phone].    ATENCIÓN: Si habla español, tiene a contreras disposición servicios gratuitos de asistencia lingüística. Llame al [phone].    We comply with applicable federal civil rights laws and [place] laws. We do not discriminate on the basis of race, color, national origin, age, disability, sex, sexual " orientation, or gender identity.            Thank you!     Thank you for choosing [facility]  for your care. Our goal is always to provide you with excellent care. Hearing back from our patients is one way we can continue to improve our services. Please take a few minutes to complete the written survey that you may receive in the mail after your visit with us. Thank you!             Your Updated Medication List - Protect others around you: Learn how to safely use, store and throw away your medicines at www.disposemymeds.org.          This list is accurate as of: 10/2/17  5:03 PM.  Always use your most recent med list.                   Brand Name Dispense Instructions for use Diagnosis    acyclovir 400 MG tablet    ZOVIRAX    60 tablet    Take 1 tablet (400 mg) by mouth 2 times daily Viral Prophylaxis.    Multiple myeloma not having achieved remission (H)       aspirin 81 MG chewable tablet      Take 81 mg by mouth        calcium carbonate-vitamin D 600-400 MG-UNIT Chew     180 tablet    Take 1 chew tab by mouth 2 times daily    Multiple myeloma not having achieved remission (H)       dexamethasone 4 MG tablet    DECADRON    30 tablet    Take 10 tablets (40 mg) by mouth every 7 days for 3 doses Days 1, 8, and 15.    Multiple myeloma not having achieved remission (H)       escitalopram 5 MG tablet    LEXAPRO    90 tablet    TAKE 1 TABLET BY MOUTH DAILY    ROBER (generalized anxiety disorder)       LENalidomide 10 MG Caps capsule CHEMO    REVLIMID    14 capsule    Take 1 capsule (10 mg) by mouth daily for 14 days Days 1 through 14.    Multiple myeloma not having achieved remission (H)       lisinopril 10 MG tablet    PRINIVIL/ZESTRIL    90 tablet    Take 1 tablet (10 mg) by mouth daily    Benign essential hypertension       LORazepam 0.5 MG tablet    ATIVAN    30 tablet    Take 1 tablet (0.5 mg) by mouth every 4 hours as needed (Anxiety, Nausea/Vomiting or Sleep)    Multiple myeloma not  having achieved remission (H)       nitroGLYcerin 0.4 MG sublingual tablet    NITROSTAT    25 tablet    For chest pain place 1 tablet under the tongue every 5 minutes for 3 doses. If symptoms persist 5 minutes after 1st dose call 911.    Atypical chest pain       * order for DME     1 Units    Please dispense one wheel chair    Multiple myeloma not having achieved remission (H)       * order for DME     1 Units    Dispense one 4 wheeled walker with hand brakes and seat    Multiple myeloma not having achieved remission (H)       * order for DME     1 Units    Wheelchair.    Need for assistance due to unsteady gait       potassium chloride 10 MEQ tablet    K-TAB,KLOR-CON    4 tablet    Take 4 tablets (40 mEq) by mouth once for 1 dose    Hypokalemia       prochlorperazine 10 MG tablet    COMPAZINE    30 tablet    Take 1 tablet (10 mg) by mouth every 6 hours as needed (Nausea/Vomiting)    Multiple myeloma not having achieved remission (H)       * Notice:  This list has 3 medication(s) that are the same as other medications prescribed for you. Read the directions carefully, and ask your doctor or other care provider to review them with you.

## 2017-10-02 NOTE — PROGRESS NOTES
Infusion Nursing Note:  Quiana Dunaway presents today for velcade.    Patient seen by provider today: No   present during visit today: Not Applicable.    Note: N/A.    Intravenous Access:  Lab draw site right upper arm, Needle type BF, Gauge 21.  Labs drawn without difficulty.    Treatment Conditions:  Lab Results   Component Value Date    HGB 10.5 10/02/2017     Lab Results   Component Value Date    WBC 4.9 10/02/2017      Lab Results   Component Value Date    ANEU 2.3 10/02/2017     Lab Results   Component Value Date     10/02/2017      Creat clearance: 63.02 mL/min  K+ 3.3  Potassium protocol ordered per Dr. Parry.  Patient instructed to  & take today Oral 40mEq K+ at  pharmacy 2nd floor of this bldg.  Patient and dtr-in-law agreed to plan.    Post Infusion Assessment:  Patient tolerated injection without incident.  Site patent and intact, free from redness, edema or discomfort.    Discharge Plan:   Discharge instructions reviewed with: Patient.  Patient and/or family verbalized understanding of discharge instructions and all questions answered.  Copy of AVS reviewed with patient and/or family. Patient discharged in stable condition accompanied by: daughter-in-law.  Departure Mode: Ambulatory.    Oralia Oliva RN

## 2017-10-03 LAB
IGG SERPL-MCNC: 717 MG/DL (ref 695–1620)
KAPPA LC UR-MCNC: 4.3 MG/DL (ref 0.33–1.94)
KAPPA LC/LAMBDA SER: 3.98 {RATIO} (ref 0.26–1.65)
LAMBDA LC SERPL-MCNC: 1.08 MG/DL (ref 0.57–2.63)

## 2017-10-04 LAB
ALBUMIN SERPL ELPH-MCNC: 3.9 G/DL (ref 3.7–5.1)
ALPHA1 GLOB SERPL ELPH-MCNC: 0.3 G/DL (ref 0.2–0.4)
ALPHA2 GLOB SERPL ELPH-MCNC: 0.7 G/DL (ref 0.5–0.9)
B-GLOBULIN SERPL ELPH-MCNC: 0.7 G/DL (ref 0.6–1)
GAMMA GLOB SERPL ELPH-MCNC: 0.7 G/DL (ref 0.7–1.6)
M PROTEIN SERPL ELPH-MCNC: 0.5 G/DL
PROT PATTERN SERPL ELPH-IMP: ABNORMAL

## 2017-10-09 ENCOUNTER — INFUSION THERAPY VISIT (OUTPATIENT)
Dept: INFUSION THERAPY | Facility: CLINIC | Age: 82
End: 2017-10-09
Attending: INTERNAL MEDICINE
Payer: MEDICARE

## 2017-10-09 ENCOUNTER — APPOINTMENT (OUTPATIENT)
Dept: ULTRASOUND IMAGING | Facility: CLINIC | Age: 82
End: 2017-10-09
Attending: INTERNAL MEDICINE
Payer: MEDICARE

## 2017-10-09 ENCOUNTER — NURSE TRIAGE (OUTPATIENT)
Dept: NURSING | Facility: CLINIC | Age: 82
End: 2017-10-09

## 2017-10-09 ENCOUNTER — HOSPITAL ENCOUNTER (OUTPATIENT)
Facility: CLINIC | Age: 82
Setting detail: OBSERVATION
Discharge: HOME | End: 2017-10-10
Attending: EMERGENCY MEDICINE | Admitting: INTERNAL MEDICINE
Payer: MEDICARE

## 2017-10-09 ENCOUNTER — HOSPITAL ENCOUNTER (OUTPATIENT)
Facility: CLINIC | Age: 82
Setting detail: SPECIMEN
End: 2017-10-09
Attending: INTERNAL MEDICINE
Payer: MEDICARE

## 2017-10-09 VITALS
HEIGHT: 62 IN | DIASTOLIC BLOOD PRESSURE: 69 MMHG | HEART RATE: 86 BPM | TEMPERATURE: 98.5 F | SYSTOLIC BLOOD PRESSURE: 141 MMHG | BODY MASS INDEX: 31.1 KG/M2 | WEIGHT: 169 LBS | OXYGEN SATURATION: 94 % | RESPIRATION RATE: 18 BRPM

## 2017-10-09 DIAGNOSIS — C90.00 MULTIPLE MYELOMA NOT HAVING ACHIEVED REMISSION (H): Primary | ICD-10-CM

## 2017-10-09 DIAGNOSIS — T50.901A MEDICATION OVERDOSE: ICD-10-CM

## 2017-10-09 DIAGNOSIS — E87.6 HYPOKALEMIA: ICD-10-CM

## 2017-10-09 PROBLEM — T42.4X1A BENZODIAZEPINE OVERDOSE, ACCIDENTAL OR UNINTENTIONAL, INITIAL ENCOUNTER: Status: ACTIVE | Noted: 2017-10-09

## 2017-10-09 LAB
ALBUMIN SERPL-MCNC: 3.3 G/DL (ref 3.4–5)
ALP SERPL-CCNC: 109 U/L (ref 40–150)
ALT SERPL W P-5'-P-CCNC: 21 U/L (ref 0–50)
ANION GAP SERPL CALCULATED.3IONS-SCNC: 5 MMOL/L (ref 3–14)
AST SERPL W P-5'-P-CCNC: 12 U/L (ref 0–45)
BASOPHILS # BLD AUTO: 0 10E9/L (ref 0–0.2)
BASOPHILS NFR BLD AUTO: 0.3 %
BILIRUB SERPL-MCNC: 0.4 MG/DL (ref 0.2–1.3)
BUN SERPL-MCNC: 17 MG/DL (ref 7–30)
CALCIUM SERPL-MCNC: 8.3 MG/DL (ref 8.5–10.1)
CHLORIDE SERPL-SCNC: 106 MMOL/L (ref 94–109)
CO2 SERPL-SCNC: 29 MMOL/L (ref 20–32)
CREAT SERPL-MCNC: 0.64 MG/DL (ref 0.52–1.04)
DIFFERENTIAL METHOD BLD: ABNORMAL
EOSINOPHIL # BLD AUTO: 0.3 10E9/L (ref 0–0.7)
EOSINOPHIL NFR BLD AUTO: 7.6 %
ERYTHROCYTE [DISTWIDTH] IN BLOOD BY AUTOMATED COUNT: 14.2 % (ref 10–15)
GFR SERPL CREATININE-BSD FRML MDRD: 90 ML/MIN/1.7M2
GLUCOSE SERPL-MCNC: 134 MG/DL (ref 70–99)
HCT VFR BLD AUTO: 31.6 % (ref 35–47)
HGB BLD-MCNC: 10.6 G/DL (ref 11.7–15.7)
IMM GRANULOCYTES # BLD: 0 10E9/L (ref 0–0.4)
IMM GRANULOCYTES NFR BLD: 0.6 %
LYMPHOCYTES # BLD AUTO: 1.1 10E9/L (ref 0.8–5.3)
LYMPHOCYTES NFR BLD AUTO: 34.3 %
MCH RBC QN AUTO: 30.5 PG (ref 26.5–33)
MCHC RBC AUTO-ENTMCNC: 33.5 G/DL (ref 31.5–36.5)
MCV RBC AUTO: 91 FL (ref 78–100)
MONOCYTES # BLD AUTO: 0.3 10E9/L (ref 0–1.3)
MONOCYTES NFR BLD AUTO: 10.4 %
NEUTROPHILS # BLD AUTO: 1.5 10E9/L (ref 1.6–8.3)
NEUTROPHILS NFR BLD AUTO: 46.8 %
NRBC # BLD AUTO: 0 10*3/UL
NRBC BLD AUTO-RTO: 0 /100
PLATELET # BLD AUTO: 174 10E9/L (ref 150–450)
POTASSIUM SERPL-SCNC: 3.4 MMOL/L (ref 3.4–5.3)
PROT SERPL-MCNC: 6.5 G/DL (ref 6.8–8.8)
RBC # BLD AUTO: 3.48 10E12/L (ref 3.8–5.2)
SODIUM SERPL-SCNC: 140 MMOL/L (ref 133–144)
WBC # BLD AUTO: 3.3 10E9/L (ref 4–11)

## 2017-10-09 PROCEDURE — 80053 COMPREHEN METABOLIC PANEL: CPT | Performed by: INTERNAL MEDICINE

## 2017-10-09 PROCEDURE — 96372 THER/PROPH/DIAG INJ SC/IM: CPT

## 2017-10-09 PROCEDURE — G0378 HOSPITAL OBSERVATION PER HR: HCPCS

## 2017-10-09 PROCEDURE — 93971 EXTREMITY STUDY: CPT | Mod: LT

## 2017-10-09 PROCEDURE — 99220 ZZC INITIAL OBSERVATION CARE,LEVL III: CPT | Performed by: INTERNAL MEDICINE

## 2017-10-09 PROCEDURE — 25000128 H RX IP 250 OP 636: Performed by: INTERNAL MEDICINE

## 2017-10-09 PROCEDURE — 99285 EMERGENCY DEPT VISIT HI MDM: CPT | Mod: 25

## 2017-10-09 PROCEDURE — 36415 COLL VENOUS BLD VENIPUNCTURE: CPT

## 2017-10-09 PROCEDURE — 85025 COMPLETE CBC W/AUTO DIFF WBC: CPT | Performed by: INTERNAL MEDICINE

## 2017-10-09 RX ORDER — NALOXONE HYDROCHLORIDE 0.4 MG/ML
.1-.4 INJECTION, SOLUTION INTRAMUSCULAR; INTRAVENOUS; SUBCUTANEOUS
Status: DISCONTINUED | OUTPATIENT
Start: 2017-10-09 | End: 2017-10-10 | Stop reason: HOSPADM

## 2017-10-09 RX ORDER — ACYCLOVIR 200 MG/1
400 CAPSULE ORAL 2 TIMES DAILY
Status: DISCONTINUED | OUTPATIENT
Start: 2017-10-09 | End: 2017-10-10 | Stop reason: HOSPADM

## 2017-10-09 RX ORDER — PROCHLORPERAZINE MALEATE 5 MG
10 TABLET ORAL EVERY 6 HOURS PRN
Status: DISCONTINUED | OUTPATIENT
Start: 2017-10-09 | End: 2017-10-10 | Stop reason: HOSPADM

## 2017-10-09 RX ORDER — LISINOPRIL 10 MG/1
10 TABLET ORAL DAILY
Status: DISCONTINUED | OUTPATIENT
Start: 2017-10-10 | End: 2017-10-10 | Stop reason: HOSPADM

## 2017-10-09 RX ORDER — ASPIRIN 81 MG/1
81 TABLET, CHEWABLE ORAL DAILY
Status: DISCONTINUED | OUTPATIENT
Start: 2017-10-10 | End: 2017-10-10 | Stop reason: HOSPADM

## 2017-10-09 RX ORDER — LENALIDOMIDE 10 MG/1
10 CAPSULE ORAL DAILY
Status: DISCONTINUED | OUTPATIENT
Start: 2017-10-09 | End: 2017-10-10 | Stop reason: HOSPADM

## 2017-10-09 RX ADMIN — ENOXAPARIN SODIUM 40 MG: 40 INJECTION SUBCUTANEOUS at 20:16

## 2017-10-09 ASSESSMENT — ENCOUNTER SYMPTOMS
WEAKNESS: 1
FEVER: 0
NAUSEA: 0
VOMITING: 0

## 2017-10-09 NOTE — ED PROVIDER NOTES
"  History     Chief Complaint:  Ingestion    HPI   Quiana Dunaway is a 82 year old female, currently undergoing chemotherapy for multiple myeloma, who presents with ingestion. At 1210, the patient inadvertently took 10 0.5 mg Ativan tablets. She takes the ativan for chemo side effects, and was scheduled for a chemo infusion this afternoon. Patient currently is feeling a bit weak and nauseas. No nausea, vomiting, or fevers. Patient also notes her left foot has been a bit more swollen than usual.    Allergies:  The patient has no known drug allergies.    Medications:    Decadron  Revlimid  Lisinopril  Lexapro  Ativan  Compazine  Aspirin 81     Past Medical History:    Multiple myeloma  HTN    Past Surgical History:    Pacemaker implant  Bone marrow biopsy    Family History:    Unknown/adopted    Social History:  Relationship status:   Tobacco use: Negative  Alcohol use: Negative  The patient presents with a family member.     Review of Systems   Constitutional: Negative for fever.   Gastrointestinal: Negative for nausea and vomiting.   Neurological: Positive for weakness.   All other systems reviewed and are negative.      Physical Exam   First Vitals:  BP: 129/60  Pulse: 62  Temp: 98.1  F (36.7  C)  Resp: 14  Height: 160 cm (5' 3\")  Weight: 76.7 kg (169 lb)  SpO2: 95 %    Physical Exam  Constitutional: The patient is oriented to person, place, and time. Sleepy, but arousable to voice, answers questions appropriately.  HENT:   Head: Atraumatic  Right Ear: Normal  Left Ear: Normal  Nose: Nose normal.   Mouth/Throat: Oropharynx is clear and moist. No erythema or exudate.   Eyes: Conjunctivae and EOM are normal. Pupils are equal, round, and reactive to light. No discharge  Neck: Normal range of motion. Neck supple.   Cardiovascular: Normal rate, regular rhythm, no murmur gallops or rubs. Intact distal pulses.    Pulmonary/Chest: CTA bilaterally. No wheezes rale or rhonchi.  Abdominal: Soft. Non tender.  No " masses   Musculoskeletal: No bony deformity. Normal range of motion. Mild pedal edema of the left foot.  Lymphadenopathy:     The patient has no cervical adenopathy.   Neurological: The patient is alert and oriented to person, place, and time. The patient has normal strength and normal reflexes. No cranial nerve deficit. Coordination normal.  Skin: Skin is warm and dry. No rash noted. The patient is not diaphoretic.   Psychiatric: The patient has a normal mood and affect.      Emergency Department Course     Emergency Department Course:  Nursing notes and vitals reviewed.  I performed an exam of the patient as documented above.  The above workup was undertaken.  1257: I spoke with poison control regarding the patient.   I rechecked the patient and discussed results.  1322: I discussed the patient with Dr. Hernandez of the hospitalist service.     Findings and plan explained to the Patient who consents to admission. Discussed the patient with Dr. Hernandez, who will admit the patient to an Obs bed for further monitoring, evaluation, and treatment.       Impression & Plan      Medical Decision Making:  Quiana Dunaway is a 82 year old female who presents after inadvertently taking 10 0.5 mg Ativan tablets, instead of her normal pre-chemo medications. On exam, she is sleepy, but arousable, and answers questions appropriately. Normal blood pressure. Lab examination is unremarkable. I spoke with poison control, who did not feel that charcoal was indicated because of the risk for aspiration. As the half life can be up to 20 hours, I do feel that observation and admission is warranted, until patient mentally clears. May use flumazenil as an antidote,  I spoke with Dr. Hernandez, who will admit patient to an observation bed.     Diagnosis:    ICD-10-CM   1. Medication overdose T50.901A     Disposition:  Admit to an Obs bed under the care of Dr. Hernandez.    VINAY, Rick Thornton, am serving as a scribe on 10/9/2017 at 12:52 PM to  personally document services performed by Anmol Uriarte MD, based on my observations and the provider's statements to me.     EMERGENCY DEPARTMENT       Anmol Uriarte MD  10/09/17 [id number]

## 2017-10-09 NOTE — H&P
"[facility]    History and Physical  Hospitalist       Date of Admission:  10/9/2017    Assessment & Plan   Quiana Dunaway is a 82 year old female with a past medical history of multiple myeloma and HTN who presented after an accidental overdose of benzodiazepine    # Benzodiazepine Overdose, Accidental   Patient accidentally took 10 tablets of 0.5 mg Ativan today.  She was supposed to take 10 Decadron tablets but took her Ativan instead.  Patient has been hemodynamically stable   - Admission to obs for overnight observation  - Will hold off on Flumazenil at this time given the patient is easily arousable    # Multiple Myeloma  - Continue Revlimad  - Will discuss with patient to see if she ever took her Decadron and if not, will provide it tomorrow    # Left Lower Extremity Swelling  Noted today.  No tenderness.  Does have significant risk factor of malignancy with current chemotherapy  - US of the LLE ordered     # H/o HTN   - Continue PTA Lisinopril       DVT Prophylaxis: Enoxaparin (Lovenox) SQ  Code Status: Full Code    Disposition: Expected discharge in 1-2 days once patient's mental status improves    Brandyn Hernandez DO    Primary Care Physician   César Chung    Chief Complaint   Accidental overdose    History is obtained from the patient    History of Present Illness   Quiana Dunaway is a 82 year old female with a past medical history of multiple myeloma who presented after accidentally overdosing on ativan.  Patient states that this morning she accidentally took 10 tablets of her 0.5 mg Ativan rather than the Decadron tablets she was supposed to take.  At this time patient currently just feels \"sleepy\" but otherwise has no complaints.  She does note that she has had increased LLE swelling but no pain.  Patient denies any difficulty breathing, pain, cough, fevers or chills.  Also denies any thoughts of harming herself or attempts at suicide.    Past Medical History    I have " reviewed this patient's medical history and updated it with pertinent information if needed.   Past Medical History:   Diagnosis Date     HTN (hypertension)      Multiple myeloma not having achieved remission (H) 7/18/2017     Pacemaker      Pancytopenia (H) 6/28/2017       Past Surgical History   I have reviewed this patient's surgical history and updated it with pertinent information if needed.  Past Surgical History:   Procedure Laterality Date     BONE MARROW BIOPSY, BONE SPECIMEN, NEEDLE/TROCAR N/A 7/12/2017    Procedure: BIOPSY BONE MARROW;  BONE MARROW BIOPSY ;  Surgeon: Grace Vela MD;  Location:  GI       Prior to Admission Medications   Prior to Admission Medications   Prescriptions Last Dose Informant Patient Reported? Taking?   ACYCLOVIR PO 10/9/2017 at am x 1 dose  Yes Yes   Sig: Take 400 mg by mouth 2 times daily   LENalidomide (REVLIMID) 10 MG CAPS capsule CHEMO 10/8/2017 at Unknown time  No Yes   Sig: Take 1 capsule (10 mg) by mouth daily for 14 days Days 1 through 14.   LORazepam (ATIVAN) 0.5 MG tablet 10/9/2017 at Unknown time  No Yes   Sig: Take 1 tablet (0.5 mg) by mouth every 4 hours as needed (Anxiety, Nausea/Vomiting or Sleep)   aspirin 81 MG chewable tablet 10/9/2017 at am  Yes Yes   Sig: Take 81 mg by mouth daily    calcium carbonate-vitamin D 600-400 MG-UNIT CHEW 10/9/2017 at am x 1 dose  Yes Yes   Sig: Take 1 chew tab by mouth 2 times daily   dexamethasone (DECADRON) 4 MG tablet has not started yet  No No   Sig: Take 10 tablets (40 mg) by mouth every 7 days for 3 doses Days 1, 8, and 15.   lisinopril (PRINIVIL/ZESTRIL) 10 MG tablet 10/9/2017 at am  No Yes   Sig: Take 1 tablet (10 mg) by mouth daily   nitroglycerin (NITROSTAT) 0.4 MG sublingual tablet prn med  No Yes   Sig: For chest pain place 1 tablet under the tongue every 5 minutes for 3 doses. If symptoms persist 5 minutes after 1st dose call 911.   order for DME   No No   Sig: Please dispense one wheel chair   order for  DME   No No   Sig: Dispense one 4 wheeled walker with hand brakes and seat   order for DME   No No   Sig: Wheelchair.   prochlorperazine (COMPAZINE) 10 MG tablet prn med  No Yes   Sig: Take 1 tablet (10 mg) by mouth every 6 hours as needed (Nausea/Vomiting)      Facility-Administered Medications: None     Allergies   No Known Allergies    Social History   I have reviewed this patient's social history and updated it with pertinent information if needed. Quiana Dunaway  reports that she has never smoked. She has never used smokeless tobacco. She reports that she does not drink alcohol or use illicit drugs.    Family History   I have reviewed this patient's family history and updated it with pertinent information if needed.   Family History   Problem Relation Age of Onset     Unknown/Adopted Mother      Unknown/Adopted Father        Review of Systems   The 10 point Review of Systems is negative other than noted in the HPI    Physical Exam   Temp: 98.3  F (36.8  C) Temp src: Oral BP: 125/46 Pulse: 64   Resp: 16 SpO2: 98 % O2 Device: Nasal cannula Oxygen Delivery: 3 LPM  Vital Signs with Ranges  Temp:  [98.1  F (36.7  C)-98.5  F (36.9  C)] 98.3  F (36.8  C)  Pulse:  [62-86] 64  Resp:  [14-18] 16  BP: (119-141)/(46-76) 125/46  SpO2:  [94 %-98 %] 98 %  169 lbs 14.4 oz    Constitutional: Sleeping but easily arousable.  Cooperative, no apparent distress.  Eyes: Conjunctiva and pupils examined and normal.  HEENT: Moist mucous membranes, normal dentition.  Respiratory: Clear to auscultation bilaterally, no crackles or wheezing.  Cardiovascular: Regular rate and rhythm, normal S1 and S2, and no murmur noted.  GI: Soft, non-distended, non-tender, normal bowel sounds.  Lymph/Hematologic: No anterior cervical or supraclavicular adenopathy.  Skin: No rashes, no cyanosis, no edema.  Musculoskeletal: Trace pitting edema in LLE.  No erythema or tenderness.  Neurologic: Cranial nerves 2-12 intact, normal strength and  sensation.  Psychiatric: Alert, oriented to person, place and time, no obvious anxiety or depression.    Data   Data reviewed today:  I personally reviewed no images or EKG's today.    Recent Labs  Lab 10/09/17  1145   WBC 3.3*   HGB 10.6*   MCV 91         POTASSIUM 3.4   CHLORIDE 106   CO2 29   BUN 17   CR 0.64   ANIONGAP 5   HUMBERTO 8.3*   *   ALBUMIN 3.3*   PROTTOTAL 6.5*   BILITOTAL 0.4   ALKPHOS 109   ALT 21   AST 12       Imaging:  No results found for this or any previous visit (from the past 24 hour(s)).

## 2017-10-09 NOTE — IP AVS SNAPSHOT
[facility]    [address] [place] [address]    Phone:  [phone]                                       After Visit Summary   10/9/2017    Quiana Dunaway    MRN: [id number]           After Visit Summary Signature Page     I have received my discharge instructions, and my questions have been answered. I have discussed any challenges I see with this plan with the nurse or doctor.    ..........................................................................................................................................  Patient/Patient Representative Signature      ..........................................................................................................................................  Patient Representative Print Name and Relationship to Patient    ..................................................               ................................................  Date                                            Time    ..........................................................................................................................................  Reviewed by Signature/Title    ...................................................              ..............................................  Date                                                            Time

## 2017-10-09 NOTE — ED NOTES
[facility]  ED Nurse Handoff Report    ED Chief complaint: Ingestion (Pt premeds before chemo, at the clinic accidently took 10 tabs 0.5 mg ativan instead of decadron at 1230 today)      ED Diagnosis:   Final diagnoses:   Medication overdose       Code Status: See H&P    Allergies: No Known Allergies    Activity level - Baseline/Home:  Independent    Activity Level - Current:   Stand with Assist     Needed?: No    Isolation: No  Infection: Not Applicable    Bariatric?: No    Vital Signs:   Vitals:    10/09/17 1300 10/09/17 1315 10/09/17 1330 10/09/17 1345   BP: 125/62 122/62 119/65 126/63   Pulse:       Resp:       Temp:       TempSrc:       SpO2: 98%      Weight:       Height:           Cardiac Rhythm: ,        Pain level: 0-10 Pain Scale: 0    Is this patient confused?: Yes, forgetful due to medications    Patient Report: Initial Complaint: patient takes 10 tablets of 4 mg decadron prior to chemo injection but accidentally took 10 0.5mg tablets of ativan. Patient took these around 1210, came over from infusion clinic.  Focused Assessment: AOx3, forgetful. AVSS. Sating in mid 90s on 2 liters NC. Now snoring in bed but arousable. NPO in ED. Bruising to abdomen from injection last week (did not receive it today)  Tests Performed: patient had labs drawn in clinic  Abnormal Results: see EPIC  Treatments provided: oxygen via MD MARY spoke to poison control    Family Comments: daughter at bedside    OBS brochure/video discussed/provided to patient: Yes    ED Medications: Medications - No data to display    Drips infusing?:  No      ED NURSE PHONE NUMBER: *[phone]

## 2017-10-09 NOTE — MR AVS SNAPSHOT
After Visit Summary   10/9/2017    Quiana Dunaway    MRN: [id number]           Patient Information     Date Of Birth          1935        Visit Information        Provider Department      10/9/2017 11:30 AM SH INFUSION CHAIR 4 [facility]        Today's Diagnoses     Multiple myeloma not having achieved remission (H)    -  1       Follow-ups after your visit        Your next 10 appointments already scheduled     Oct 10, 2017  3:00 PM CDT   Return Visit with Alex Parry MD   [facility] ([facility])    [place] [address]  [place] [address]   [phone]            Oct 16, 2017  1:00 PM CDT   Level 2 with SH INFUSION CHAIR 8   [facility] ([facility])    [place] [id number]  [place] [address]   [phone]            Oct 23, 2017  8:00 AM CDT   Level 2 with SH INFUSION CHAIR 6   [facility] ([facility])    [facility] [place] [id number]  [place] [address]   [phone]            Oct 30, 2017  2:00 PM CDT   Level 2 with SH INFUSION CHAIR 7   [facility] ([facility])    [facility] [place] [id number]  [place] [address]   [phone]            Oct 30, 2017  4:30 PM CDT   Courtesy Device Check with RIOS DCR2   [facility] [place] ([facility])    [address]  [place] [address]   [phone]            Nov 06, 2017  2:00 PM CST   Level 2 with SH INFUSION CHAIR 19   [facility] ([facility])    [facility]  [address]  [place] [address]   [phone]            Nov 13, 2017  2:00 PM CST   Level 2 with SH  INFUSION CHAIR 12   [facility] ([facility])    [facility] [place]  [address]  [place] [address]   [phone]            Nov 20, 2017  2:00 PM CST   Level 2 with SH INFUSION CHAIR 2   [facility] ([facility])    [facility] [place]  [address]  [place] [address]   [phone]            Nov 27, 2017  2:00 PM CST   Level 2 with SH INFUSION CHAIR 2   [facility] ([facility])    [facility] [place]  [address]  [place] [address] [phone]            Dec 06, 2017  2:00 PM CST   Remote PPM Check with RIOS TECH1   [facility] AT [place] ([email or web address])    [address]  [place] [address] [phone]           This appointment is for a remote check of your pacemaker.  This is not an appointment at the office.              Who to contact     If you have questions or need follow up information about today's clinic visit or your schedule please contact [facility] AND INFUSION [place] directly at [phone].  Normal or non-critical lab and imaging results will be communicated to you by TrackBillhart, letter or phone within 4 business days after the clinic has received the results. If you do not hear from us within 7 days, please contact the clinic through TrackBillhart or phone. If you have a critical or abnormal lab result, we will notify you by phone as soon as possible.  Submit refill requests through [employer] or call your pharmacy and they will forward the refill request to us. Please allow 3 business days for your refill to be completed.          Additional Information About Your Visit        [employer] Information     [employer] lets you send messages to your doctor, view your test results, renew your prescriptions, schedule  "appointments and more. To sign up, go to [email or web address] . Click on \"Log in\" on the left side of the screen, which will take you to the Welcome page. Then click on \"Sign up Now\" on the right side of the page.     You will be asked to enter the access code listed below, as well as some personal information. Please follow the directions to create your username and password.     Your access code is: [email or web address]  Expires: 2017  3:33 PM     Your access code will  in 90 days. If you need help or a new code, please call your [place] clinic or [phone].        Care EveryWhere ID     This is your Care EveryWhere ID. This could be used by other organizations to access your [place] medical records  [id number]        Your Vitals Were     Pulse Temperature Respirations Height Pulse Oximetry BMI (Body Mass Index)    86 98.5  F (36.9  C) (Oral) 18 1.575 m (5' 2.01\") 94% 30.9 kg/m2       Blood Pressure from Last 3 Encounters:   10/09/17 141/69   10/02/17 127/62   17 112/60    Weight from Last 3 Encounters:   10/09/17 76.7 kg (169 lb)   10/02/17 76.9 kg (169 lb 9.6 oz)   17 76.2 kg (168 lb)               Primary Care Provider Office Phone # Fax #    César JAQUEZ MD [place] [phone]       [facility] [address] [address]        Equal Access to Services     [facility]: Hadii aad ku hadasho Soomaali, waaxda luqadaha, qaybta kaalmada miriam, [place] villeda. So [facility] [phone].    ATENCIÓN: Si habla español, tiene a contreras disposición servicios gratuitos de asistencia lingüística. Llame al [phone].    We comply with applicable federal civil rights laws and [place] laws. We do not discriminate on the basis of race, color, national origin, age, disability, sex, sexual orientation, or gender identity.            Thank you!     Thank you for choosing [facility] AND [facility]  for your care. Our goal is " always to provide you with excellent care. Hearing back from our patients is one way we can continue to improve our services. Please take a few minutes to complete the written survey that you may receive in the mail after your visit with us. Thank you!             Your Updated Medication List - Protect others around you: Learn how to safely use, store and throw away your medicines at www.disposemymeds.org.          This list is accurate as of: 10/9/17 11:45 AM.  Always use your most recent med list.                   Brand Name Dispense Instructions for use Diagnosis    acyclovir 400 MG tablet    ZOVIRAX    60 tablet    Take 1 tablet (400 mg) by mouth 2 times daily Viral Prophylaxis.    Multiple myeloma not having achieved remission (H)       aspirin 81 MG chewable tablet      Take 81 mg by mouth        calcium carbonate-vitamin D 600-400 MG-UNIT Chew     180 tablet    Take 1 chew tab by mouth 2 times daily    Multiple myeloma not having achieved remission (H)       dexamethasone 4 MG tablet    DECADRON    30 tablet    Take 10 tablets (40 mg) by mouth every 7 days for 3 doses Days 1, 8, and 15.    Multiple myeloma not having achieved remission (H)       escitalopram 5 MG tablet    LEXAPRO    90 tablet    TAKE 1 TABLET BY MOUTH DAILY    ROBER (generalized anxiety disorder)       LENalidomide 10 MG Caps capsule CHEMO    REVLIMID    14 capsule    Take 1 capsule (10 mg) by mouth daily for 14 days Days 1 through 14.    Multiple myeloma not having achieved remission (H)       lisinopril 10 MG tablet    PRINIVIL/ZESTRIL    90 tablet    Take 1 tablet (10 mg) by mouth daily    Benign essential hypertension       LORazepam 0.5 MG tablet    ATIVAN    30 tablet    Take 1 tablet (0.5 mg) by mouth every 4 hours as needed (Anxiety, Nausea/Vomiting or Sleep)    Multiple myeloma not having achieved remission (H)       nitroGLYcerin 0.4 MG sublingual tablet    NITROSTAT    25 tablet    For chest pain place 1 tablet under the tongue  every 5 minutes for 3 doses. If symptoms persist 5 minutes after 1st dose call 911.    Atypical chest pain       * order for DME     1 Units    Please dispense one wheel chair    Multiple myeloma not having achieved remission (H)       * order for DME     1 Units    Dispense one 4 wheeled walker with hand brakes and seat    Multiple myeloma not having achieved remission (H)       * order for DME     1 Units    Wheelchair.    Need for assistance due to unsteady gait       prochlorperazine 10 MG tablet    COMPAZINE    30 tablet    Take 1 tablet (10 mg) by mouth every 6 hours as needed (Nausea/Vomiting)    Multiple myeloma not having achieved remission (H)       * Notice:  This list has 3 medication(s) that are the same as other medications prescribed for you. Read the directions carefully, and ask your doctor or other care provider to review them with you.

## 2017-10-09 NOTE — PROGRESS NOTES
Infusion Nursing Note:  Quiana Dunaway presents today for C3D8 Velcade.    Patient seen by provider today: No   present during visit today: Not Applicable.    Note: Lab results back approximately 1210 and pt advised to take oral decadron from pt's own supply.  Patient's daughter in law handed RN an empty bottle of ativan stating she needed a refill.  Refill called to pharmacy downstairs.  Patient's daughter in law questioning patient which medication she just took.  Patient stated she took 10 white pills.  Current Decadron pills are green--20 tabs noted in patient's bottle.  It is noted that patient took 10 tabs of ativan 0.5mg tabs, totally 5mg, instead of 10 tabs of decadron.  Patient brought to ER via wheelchair by 2 RN's.  Dr. Parry notified.    Intravenous Access:  Lab draw site right ac, Needle type BF, Gauge 23.  Labs drawn without difficulty.    Treatment Conditions:  Lab Results   Component Value Date    HGB 10.6 10/09/2017     Lab Results   Component Value Date    WBC 3.3 10/09/2017      Lab Results   Component Value Date    ANEU 1.5 10/09/2017     Lab Results   Component Value Date     10/09/2017      Results reviewed, labs MET treatment parameters, ok to proceed with treatment.    PT DID NOT RECEIVE VELCADE TODAY.  PLEASE SEE ABOVE NOTE.        Post Infusion Assessment:  NA--SEE ABOVE NOTE.    Discharge Plan:   Patient discharged in stable condition accompanied by: daughter-in-law.  Departure Mode: Wheelchair to ER by 2 nurses.    Wes New RN

## 2017-10-09 NOTE — IP AVS SNAPSHOT
MRN:[id number]                      After Visit Summary   10/9/2017    Quiana Dunaway    MRN: [id number]           Thank you!     Thank you for choosing [facility] for your care. Our goal is always to provide you with excellent care. Hearing back from our patients is one way we can continue to improve our services. Please take a few minutes to complete the written survey that you may receive in the mail after you visit with us. Thank you!        Patient Information     Date Of Birth          1935        About your hospital stay     You were admitted on:  October 9, 2017 [facility]    You were discharged on:  October 10, 2017        Reason for your hospital stay       Accidental Ativan overdose.                  Who to Call     For medical emergencies, please call 911.  For non-urgent questions about your medical care, please call your primary care provider or clinic, [phone]          Attending Provider     Provider Specialty    Anmol Uriarte MD Emergency Medicine    [place], Brandyn Gutiérrez DO Internal Medicine       Primary Care Provider Office Phone # Fax #    César Chung [phone]      After Care Instructions     Activity       Your activity upon discharge: activity as tolerated            Diet       Follow this diet upon discharge:    Regular Diet Adult                  Follow-up Appointments     Follow-up and recommended labs and tests        Follow up with Dr. Parry as already scheduled.                  Your next 10 appointments already scheduled     Oct 10, 2017  3:00 PM CDT   Return Visit with Alex Parry MD   [facility] ([facility])    n Medical Ctr [facility]  [address]  [facility] [address]   [phone]            Oct 16, 2017  1:00 PM CDT   Level 2 with  INFUSION CHAIR 8   [facility] ([facility]  [facility])    [facility] [place]  [address]  [place] [address]   [phone]            Oct 23, 2017  8:00 AM CDT   Level 2 with SH INFUSION CHAIR 6   [facility] ([facility])    [facility] [place]  [address]  [place] [address]   [phone]            Oct 30, 2017  2:00 PM CDT   Level 2 with SH INFUSION CHAIR 7   [facility] ([facility])    [facility] [place]  [address]  [place] [address]   [phone]            Oct 30, 2017  4:30 PM CDT   Courtesy Device Check with RIOS DCR2   [facility] [place] ([facility])    [address]  [place] [address]   [phone]            Nov 06, 2017  2:00 PM CST   Level 2 with SH INFUSION CHAIR 19   [facility] ([facility])    [facility] [place]  [address]  [place] [address]   [phone]            Nov 13, 2017  2:00 PM CST   Level 2 with SH INFUSION CHAIR 12   [facility] ([facility])    [facility] [place]  [address]  [place] [address]   [phone]            Nov 20, 2017  2:00 PM CST   Level 2 with SH INFUSION CHAIR 2   [facility] ([facility])    [facility] [place]  [address]  [place] [address]   [phone]            Nov 27, 2017  2:00 PM CST   Level 2 with SH INFUSION CHAIR 2   [facility] ([facility])    [facility] [place]  [address]  [place] [address]   [phone]            Dec 06, 2017  2:00 PM CST   Remote PPM Check with RIOS TECH1   [facility] ([facility])     "[id number]  [facility] [address]   [phone]           This appointment is for a remote check of your pacemaker.  This is not an appointment at the office.              Pending Results     No orders found for last 3 day(s).            Statement of Approval     Ordered          10/10/17 0946  I have reviewed and agree with all the recommendations and orders detailed in this document.  EFFECTIVE NOW     Comments:  Waiting on hearing from Isabel care coordinator about appointment and ctx with Dr. Parry. Please call have not heard from her by 10:30am. Let me know whether he plans to giver her ctx so that I can order her decadron. Parth Yeager   Approved and electronically signed by:  Barthell, Joanna Kersten Ulmen, PA-C             Admission Information     Date & Time Provider Department Dept. Phone    10/9/2017 Brandyn Hernandez DO [facility] [phone]      Your Vitals Were     Blood Pressure Pulse Temperature Respirations Height Weight    134/53 63 98.7  F (37.1  C) (Oral) 18 1.6 m (5' 2.99\") 77.1 kg (169 lb 14.4 oz)    Pulse Oximetry BMI (Body Mass Index)                94% 30.1 kg/m2          MyChart Information     [employer] lets you send messages to your doctor, view your test results, renew your prescriptions, schedule appointments and more. To sign up, go to [email or web address] . Click on \"Log in\" on the left side of the screen, which will take you to the Welcome page. Then click on \"Sign up Now\" on the right side of the page.     You will be asked to enter the access code listed below, as well as some personal information. Please follow the directions to create your username and password.     Your access code is: [email or web address]  Expires: 2017  3:33 PM     Your access code will  in 90 days. If you need help or a new code, please call your [place] clinic or [phone].        Care EveryWhere ID     This is your Care EveryWhere ID. This could " be used by other organizations to access your [place] medical records  [id number]        Equal Access to Services     LILLIANA SANDOVAL : Hadii althea Contreras, chance escobar, nelda hymansergiobertha pineda, phoenix hdzysabelabdirahman villeda. So [facility] [phone].    ATENCIÓN: Si habla español, tiene a contreras disposición servicios gratuitos de asistencia lingüística. Llame al [phone].    We comply with applicable federal civil rights laws and [place] laws. We do not discriminate on the basis of race, color, national origin, age, disability, sex, sexual orientation, or gender identity.               Review of your medicines      CONTINUE these medicines which have NOT CHANGED        Dose / Directions    ACYCLOVIR PO        Dose:  400 mg   Take 400 mg by mouth 2 times daily   Refills:  0       aspirin 81 MG chewable tablet        Dose:  81 mg   Take 81 mg by mouth daily   Refills:  0       calcium carbonate-vitamin D 600-400 MG-UNIT Chew   Used for:  Multiple myeloma not having achieved remission (H)        Dose:  1 chew tab   Take 1 chew tab by mouth 2 times daily   Quantity:  180 tablet   Refills:  3       dexamethasone 4 MG tablet   Commonly known as:  DECADRON   Used for:  Multiple myeloma not having achieved remission (H)        Dose:  40 mg   Take 10 tablets (40 mg) by mouth every 7 days for 3 doses Days 1, 8, and 15.   Quantity:  30 tablet   Refills:  0       LENalidomide 10 MG Caps capsule CHEMO   Commonly known as:  REVLIMID   Used for:  Multiple myeloma not having achieved remission (H)        Dose:  10 mg   Take 1 capsule (10 mg) by mouth daily for 14 days Days 1 through 14.   Quantity:  14 capsule   Refills:  0       lisinopril 10 MG tablet   Commonly known as:  PRINIVIL/ZESTRIL   Used for:  Benign essential hypertension        Dose:  10 mg   Take 1 tablet (10 mg) by mouth daily   Quantity:  90 tablet   Refills:  3       LORazepam 0.5 MG tablet   Commonly known as:  ATIVAN   Used for:   Multiple myeloma not having achieved remission (H)        Dose:  0.5 mg   Take 1 tablet (0.5 mg) by mouth every 4 hours as needed (Anxiety, Nausea/Vomiting or Sleep)   Quantity:  30 tablet   Refills:  3       nitroGLYcerin 0.4 MG sublingual tablet   Commonly known as:  NITROSTAT   Used for:  Atypical chest pain        For chest pain place 1 tablet under the tongue every 5 minutes for 3 doses. If symptoms persist 5 minutes after 1st dose call 911.   Quantity:  25 tablet   Refills:  0       * order for DME   Used for:  Multiple myeloma not having achieved remission (H)        Please dispense one wheel chair   Quantity:  1 Units   Refills:  0       * order for DME   Used for:  Multiple myeloma not having achieved remission (H)        Dispense one 4 wheeled walker with hand brakes and seat   Quantity:  1 Units   Refills:  0       * order for DME   Used for:  Need for assistance due to unsteady gait        Wheelchair.   Quantity:  1 Units   Refills:  0       prochlorperazine 10 MG tablet   Commonly known as:  COMPAZINE   Used for:  Multiple myeloma not having achieved remission (H)        Dose:  10 mg   Take 1 tablet (10 mg) by mouth every 6 hours as needed (Nausea/Vomiting)   Quantity:  30 tablet   Refills:  3       * Notice:  This list has 3 medication(s) that are the same as other medications prescribed for you. Read the directions carefully, and ask your doctor or other care provider to review them with you.            ANTIBIOTIC INSTRUCTION     You've Been Prescribed an Antibiotic - Now What?  Your healthcare team thinks that you or your loved one might have an infection. Some infections can be treated with antibiotics, which are powerful, life-saving drugs. Like all medications, antibiotics have side effects and should only be used when necessary. There are some important things you should know about your antibiotic treatment.      Your healthcare team may run tests before you start taking an antibiotic.    Your  team may take samples (e.g., from your blood, urine or other areas) to run tests to look for bacteria. These test can be important to determine if you need an antibiotic at all and, if you do, which antibiotic will work best.      Within a few days, your healthcare team might change or even stop your antibiotic.    Your team may start you on an antibiotic while they are working to find out what is making you sick.    Your team might change your antibiotic because test results show that a different antibiotic would be better to treat your infection.    In some cases, once your team has more information, they learn that you do not need an antibiotic at all. They may find out that you don't have an infection, or that the antibiotic you're taking won't work against your infection. For example, an infection caused by a virus can't be treated with antibiotics. Staying on an antibiotic when you don't need it is more likely to be harmful than helpful.      You may experience side effects from your antibiotic.    Like all medications, antibiotics have side effects. Some of these can be serious.    Let you healthcare team know if you have any known allergies when you are admitted to the hospital.    One significant side effect of nearly all antibiotics is the risk of severe and sometimes deadly diarrhea caused by Clostridium difficile (C. Difficile). This occurs when a person takes antibiotics because some good germs are destroyed. Antibiotic use allows C. diificile to take over, putting patients at high risk for this serious infection.    As a patient or caregiver, it is important to understand your or your loved one's antibiotic treatment. It is especially important for caregivers to speak up when patients can't speak for themselves. Here are some important questions to ask your healthcare team.    What infection is this antibiotic treating and how do you know I have that infection?    What side effects might occur from  this antibiotic?    How long will I need to take this antibiotic?    Is it safe to take this antibiotic with other medications or supplements (e.g., vitamins) that I am taking?     Are there any special directions I need to know about taking this antibiotic? For example, should I take it with food?    How will I be monitored to know whether my infection is responding to the antibiotic?    What tests may help to make sure the right antibiotic is prescribed for me?      Information provided by:  www.cdc.gov/getsmart  U.S. Department of Health and Human Services  Centers for disease Control and Prevention  National Center for Emerging and Zoonotic Infectious Diseases  Division of Healthcare Quality Promotion         Protect others around you: Learn how to safely use, store and throw away your medicines at www.disposemymeds.org.             Medication List: This is a list of all your medications and when to take them. Check marks below indicate your daily home schedule. Keep this list as a reference.      Medications           Morning Afternoon Evening Bedtime As Needed    ACYCLOVIR PO   Take 400 mg by mouth 2 times daily   Last time this was given:  400 mg on 10/10/2017  8:21 AM                                      aspirin 81 MG chewable tablet   Take 81 mg by mouth daily   Last time this was given:  81 mg on 10/10/2017  8:23 AM                                   calcium carbonate-vitamin D 600-400 MG-UNIT Chew   Take 1 chew tab by mouth 2 times daily                                      dexamethasone 4 MG tablet   Commonly known as:  DECADRON   Take 10 tablets (40 mg) by mouth every 7 days for 3 doses Days 1, 8, and 15.                                LENalidomide 10 MG Caps capsule CHEMO   Commonly known as:  REVLIMID   Take 1 capsule (10 mg) by mouth daily for 14 days Days 1 through 14.                                   lisinopril 10 MG tablet   Commonly known as:  PRINIVIL/ZESTRIL   Take 1 tablet (10 mg) by mouth  daily   Last time this was given:  10 mg on 10/10/2017  8:21 AM                                   LORazepam 0.5 MG tablet   Commonly known as:  ATIVAN   Take 1 tablet (0.5 mg) by mouth every 4 hours as needed (Anxiety, Nausea/Vomiting or Sleep)                                   nitroGLYcerin 0.4 MG sublingual tablet   Commonly known as:  NITROSTAT   For chest pain place 1 tablet under the tongue every 5 minutes for 3 doses. If symptoms persist 5 minutes after 1st dose call 911.                                   * order for DME   Please dispense one wheel chair                                * order for DME   Dispense one 4 wheeled walker with hand brakes and seat                                * order for DME   Wheelchair.                                prochlorperazine 10 MG tablet   Commonly known as:  COMPAZINE   Take 1 tablet (10 mg) by mouth every 6 hours as needed (Nausea/Vomiting)                                   * Notice:  This list has 3 medication(s) that are the same as other medications prescribed for you. Read the directions carefully, and ask your doctor or other care provider to review them with you.

## 2017-10-10 ENCOUNTER — ONCOLOGY VISIT (OUTPATIENT)
Dept: ONCOLOGY | Facility: CLINIC | Age: 82
End: 2017-10-10
Attending: INTERNAL MEDICINE
Payer: MEDICARE

## 2017-10-10 VITALS
HEIGHT: 63 IN | SYSTOLIC BLOOD PRESSURE: 134 MMHG | TEMPERATURE: 98.7 F | WEIGHT: 169.9 LBS | HEART RATE: 63 BPM | OXYGEN SATURATION: 94 % | BODY MASS INDEX: 30.11 KG/M2 | DIASTOLIC BLOOD PRESSURE: 53 MMHG | RESPIRATION RATE: 18 BRPM

## 2017-10-10 VITALS
TEMPERATURE: 97.8 F | BODY MASS INDEX: 29.41 KG/M2 | RESPIRATION RATE: 18 BRPM | SYSTOLIC BLOOD PRESSURE: 138 MMHG | DIASTOLIC BLOOD PRESSURE: 81 MMHG | OXYGEN SATURATION: 95 % | WEIGHT: 166 LBS | HEART RATE: 68 BPM

## 2017-10-10 DIAGNOSIS — C90.00 MULTIPLE MYELOMA NOT HAVING ACHIEVED REMISSION (H): Primary | ICD-10-CM

## 2017-10-10 PROCEDURE — G0378 HOSPITAL OBSERVATION PER HR: HCPCS

## 2017-10-10 PROCEDURE — A9270 NON-COVERED ITEM OR SERVICE: HCPCS | Mod: GY | Performed by: INTERNAL MEDICINE

## 2017-10-10 PROCEDURE — 25000128 H RX IP 250 OP 636: Performed by: INTERNAL MEDICINE

## 2017-10-10 PROCEDURE — 90662 IIV NO PRSV INCREASED AG IM: CPT | Performed by: INTERNAL MEDICINE

## 2017-10-10 PROCEDURE — 25000132 ZZH RX MED GY IP 250 OP 250 PS 637: Mod: GY | Performed by: INTERNAL MEDICINE

## 2017-10-10 PROCEDURE — 99217 ZZC OBSERVATION CARE DISCHARGE: CPT | Performed by: PHYSICIAN ASSISTANT

## 2017-10-10 PROCEDURE — G0008 ADMIN INFLUENZA VIRUS VAC: HCPCS

## 2017-10-10 PROCEDURE — 99211 OFF/OP EST MAY X REQ PHY/QHP: CPT

## 2017-10-10 PROCEDURE — 99214 OFFICE O/P EST MOD 30 MIN: CPT | Performed by: INTERNAL MEDICINE

## 2017-10-10 RX ORDER — LORAZEPAM 0.5 MG/1
0.5 TABLET ORAL EVERY 8 HOURS PRN
Qty: 10 TABLET | Refills: 3 | Status: SHIPPED | OUTPATIENT
Start: 2017-10-10 | End: 2018-04-16

## 2017-10-10 RX ADMIN — ASPIRIN 81 MG 81 MG: 81 TABLET ORAL at 08:23

## 2017-10-10 RX ADMIN — LISINOPRIL 10 MG: 10 TABLET ORAL at 08:21

## 2017-10-10 RX ADMIN — ACYCLOVIR 400 MG: 200 CAPSULE ORAL at 08:21

## 2017-10-10 RX ADMIN — INFLUENZA A VIRUSA/MICHIGAN/45/2015 X-275 (H1N1) ANTIGEN (FORMALDEHYDE INACTIVATED), INFLUENZA A VIRUS A/HONG KONG/4801/2014 X-263B (H3N2) ANTIGEN (FORMALDEHYDE INACTIVATED), AND INFLUENZA B VIRUS B/BRISBANE/60/2008 ANTIGEN (FORMALDEHYDE INACTIVATED) 0.5 ML: 60; 60; 60 INJECTION, SUSPENSION INTRAMUSCULAR at 15:52

## 2017-10-10 RX ADMIN — LENALIDOMIDE 10 MG: 10 CAPSULE ORAL at 10:24

## 2017-10-10 NOTE — PLAN OF CARE
Observation goals Start: 10/09/17 1445, PRIOR TO DISCHARGE, Routine       Comments: -diagnostic tests and consults completed and resulted: not met  -vital signs normal or at patient baseline: met  -returns to baseline functional status: not met  Nurse to notify provider when observation goals have been met and patient is ready for discharge.

## 2017-10-10 NOTE — PLAN OF CARE
Problem: Patient Care Overview  Goal: Plan of Care/Patient Progress Review  Outcome: Improving   -diagnostic tests and consults completed and resulted: met  -vital signs normal or at patient baseline: met  -returns to baseline functional status: met  Nurse to notify provider when observation goals have been met and patient is ready for discharge.     A&Ox4. VSS on RA. Poor appetite. Denies pain. Lower extremities slightly edematous, pt reports improved from yesterday. Ambulated in halls SBA. Tele- 100% A-paced. Pt has appt w/Dr. Parry at 1300 today. Will discharge this morning and f/u w/Dr. Parry.

## 2017-10-10 NOTE — PROGRESS NOTES
"Oncology Rooming Note    October 10, 2017 3:02 PM   Quiana Dunaway is a 82 year old female who presents for:    Chief Complaint   Patient presents with     Oncology Clinic Visit     follow up from hospital     Initial Vitals: There were no vitals taken for this visit. Estimated body mass index is 30.1 kg/(m^2) as calculated from the following:    Height as of 10/9/17: 1.6 m (5' 2.99\").    Weight as of 10/9/17: 77.1 kg (169 lb 14.4 oz). There is no height or weight on file to calculate BSA.  Data Unavailable Comment: Data Unavailable   No LMP recorded. Patient is postmenopausal.  Allergies reviewed: Yes  Medications reviewed: Yes    Medications: Medication refills not needed today.  Pharmacy name entered into [employer]:    [facility] [address] [place] [address] - [address] [facility] [address]  [facility] [place], [place] - [address]  [facility] - [place], [place] - [address]    Clinical concerns: Would like to ask about the chemo shot missed yesterday, and also pt would like her flu shot. Sohan was notified.    10 minutes for nursing intake (face to face time)     Bridgette Trejo MA    Injectable Influenza Immunization Documentation    1.  Has the patient received the information for the injectable influenza vaccine? YES     2. Is the patient 6 months of age or older? YES     3. Does the patient have any of the following contraindications?         Severe allergy to eggs? No     Severe allergic reaction to previous influenza vaccines? No   Severe allergy to latex? No       History of Guillain-Buffalo syndrome? No     Currently have a temperature greater than 100.4F? No        4.  Severely egg allergic patients should have flu vaccine eligibility assessed by an MD, RN, or pharmacist, and those who received flu vaccine should be observed for 15 min by an MD, RN, Pharmacist, Medical Technician, or member of clinic staff.\": YES    5. Latex-allergic patients should " be given latex-free influenza vaccine Yes. Please reference the Vaccine latex table to determine if your clinic s product is latex-containing.       Vaccination given by Isabel Sarmiento MA      DISCHARGE PLAN: please see below instructions for follow up.  Next appointments: See patient instruction section. Future appointment scheduled by RIVKA Hall  Departure Mode: Ambulatory  Accompanied by: daughter  4 minutes for nursing discharge (face to face time)   Isabel Sarmiento MA      Flu shot.- Given by RIVKA Hall  Delay velcade to next Monday. scheduled  Continue revlimid and dexamethasone. Patient aware  Follow up in about 1 month.- Scheduled by RIVKA Hall

## 2017-10-10 NOTE — PROVIDER NOTIFICATION
MD Notification    Notified Person:  MD    Notified Persons Name: Dr. Yeh     Notification Date/Time: 11:25 PM 10/09/17    Notification Interaction:  Talked with Physician    Purpose of Notification: verification that it is ok to continue with home revlimd despite low ANC.     Orders Received:if pt was suppose to get this medication then it is ok to give it tonight despite low ANC.     Comments:

## 2017-10-10 NOTE — DISCHARGE SUMMARY
Patient has been discharged to home by wheelchair at October 10, 2017 10:30 AM.  Home medication regimen discussed with patient and patient verbalizes understanding. Diet and activity discussed with patient and patient verbalizes understanding. Upcoming appointments also discussed. Patient had no questions.

## 2017-10-10 NOTE — PATIENT INSTRUCTIONS
Flu shot.- Given by RIVKA Hall  Delay velcade to next Monday. scheduled  Continue revlimid and dexamethasone. Patient aware  Follow up in about 1 month.- Scheduled by RIVKA Hall

## 2017-10-10 NOTE — MR AVS SNAPSHOT
After Visit Summary   10/10/2017    Quiana Dunaway    MRN: [id number]           Patient Information     Date Of Birth          1935        Visit Information        Provider Department      10/10/2017 3:00 PM Alex Parry MD [facility]        Today's Diagnoses     Multiple myeloma not having achieved remission (H)    -  1      Care Instructions    Flu shot.- Given by RIVKA Hall  Delay velcade to next Monday. scheduled  Continue revlimid and dexamethasone. Patient aware  Follow up in about 1 month.- Scheduled by RIVKA Hall          Follow-ups after your visit        Your next 10 appointments already scheduled     Oct 23, 2017  2:00 PM CDT   Level 2 with  INFUSION CHAIR 14   [facility] ([facility])    [facility] [place] [id number]  [place] [address]   [phone]            Oct 30, 2017  2:00 PM CDT   Level 2 with SH INFUSION CHAIR 7   [facility] ([facility])    [facility] [place] [id number]  [place] [address]   [phone]            Oct 30, 2017  4:30 PM CDT   Courtesy Device Check with RIOS DCR2   [facility] [place] ([facility])    [address] [place] [address] [address]  [place] [address]   [phone]            Nov 06, 2017  1:30 PM CST   Return Visit with Alex Parry MD   [facility] ([facility])    [facility]  [address]  [place] [address]   [phone]            Nov 06, 2017  2:00 PM CST   Level 2 with SH INFUSION CHAIR 19   [facility] ([facility])    [facility]  [address]  [place] [address]   [phone]            Nov 13, 2017  2:00 PM CST   Level 2 with SH INFUSION CHAIR 12   [facility]  "Center ([facility])    [facility] [place]  [address]  [place] [address]   [phone]            Nov 20, 2017  2:00 PM CST   Level 2 with SH INFUSION CHAIR 2   [facility] ([facility])    [facility] [place]  [address]  [place] [address]   [phone]            Nov 27, 2017  2:00 PM CST   Level 2 with SH INFUSION CHAIR 2   [facility] ([facility])    [facility] [place]  [address]  [place] [address] [phone]            Dec 06, 2017  2:00 PM CST   Remote PPM Check with RIOS TECH1   [facility] [place] ([facility])    [address]  [place] [address] [phone]           This appointment is for a remote check of your pacemaker.  This is not an appointment at the office.              Who to contact     If you have questions or need follow up information about today's clinic visit or your schedule please contact [facility] directly at [phone].  Normal or non-critical lab and imaging results will be communicated to you by Jawbonehart, letter or phone within 4 business days after the clinic has received the results. If you do not hear from us within 7 days, please contact the clinic through Jawbonehart or phone. If you have a critical or abnormal lab result, we will notify you by phone as soon as possible.  Submit refill requests through [employer] or call your pharmacy and they will forward the refill request to us. Please allow 3 business days for your refill to be completed.          Additional Information About Your Visit        [employer] Information     [employer] lets you send messages to your doctor, view your test results, renew your prescriptions, schedule appointments and more. To sign up, go to [email or web address] . Click on \"Log in\" " "on the left side of the screen, which will take you to the Welcome page. Then click on \"Sign up Now\" on the right side of the page.     You will be asked to enter the access code listed below, as well as some personal information. Please follow the directions to create your username and password.     Your access code is: [email or web address]  Expires: 1/15/2018  2:13 PM     Your access code will  in 90 days. If you need help or a new code, please call your [facility] or [phone].        Care EveryWhere ID     This is your Care EveryWhere ID. This could be used by other organizations to access your [facility] medical records  [id number]        Your Vitals Were     Pulse Temperature Respirations Pulse Oximetry BMI (Body Mass Index)       68 97.8  F (36.6  C) (Oral) 18 95% 29.41 kg/m2        Blood Pressure from Last 3 Encounters:   No data found for BP    Weight from Last 3 Encounters:   No data found for Wt              Today, you had the following     No orders found for display         Today's Medication Changes          These changes are accurate as of: 10/10/17 11:59 PM.  If you have any questions, ask your nurse or doctor.               These medicines have changed or have updated prescriptions.        Dose/Directions    LORazepam 0.5 MG tablet   Commonly known as:  ATIVAN   This may have changed:  when to take this   Used for:  Multiple myeloma not having achieved remission (H)        Dose:  0.5 mg   Take 1 tablet (0.5 mg) by mouth every 8 hours as needed (Anxiety, Nausea/Vomiting or Sleep)   Quantity:  10 tablet   Refills:  3            Where to get your medicines      Some of these will need a paper prescription and others can be bought over the counter.  Ask your nurse if you have questions.     Bring a paper prescription for each of these medications     LORazepam 0.5 MG tablet                Primary Care Provider Office Phone # Fax #    César Chung [phone]       [facility] [place] [id number] [facility] [address]  [facility] [address]        Equal Access to Services     PAMELLAKEVON LORI : Hadii althea quevedo hadivelisseyaquelin Sonellie, waaxda luqadaha, qaybta kasergioda aurelioaraselibertha, phoenix gaines solfrancisco careydenzel andreinaysabelabdirahman villeda. So [facility] [phone].    ATENCIÓN: Si habla español, tiene a contreras disposición servicios gratuitos de asistencia lingüística. Llame al [phone].    We comply with applicable federal civil rights laws and [place] laws. We do not discriminate on the basis of race, color, national origin, age, disability, sex, sexual orientation, or gender identity.            Thank you!     Thank you for choosing [facility]  for your care. Our goal is always to provide you with excellent care. Hearing back from our patients is one way we can continue to improve our services. Please take a few minutes to complete the written survey that you may receive in the mail after your visit with us. Thank you!             Your Updated Medication List - Protect others around you: Learn how to safely use, store and throw away your medicines at www.disposemymeds.org.          This list is accurate as of: 10/10/17 11:59 PM.  Always use your most recent med list.                   Brand Name Dispense Instructions for use Diagnosis    ACYCLOVIR PO      Take 400 mg by mouth 2 times daily        aspirin 81 MG chewable tablet      Take 81 mg by mouth daily        calcium carbonate-vitamin D 600-400 MG-UNIT Chew     180 tablet    Take 1 chew tab by mouth 2 times daily    Multiple myeloma not having achieved remission (H)       dexamethasone 4 MG tablet    DECADRON    30 tablet    Take 10 tablets (40 mg) by mouth every 7 days for 3 doses Days 1, 8, and 15.    Multiple myeloma not having achieved remission (H)       LENalidomide 10 MG Caps capsule CHEMO    REVLIMID    14 capsule    Take 1 capsule (10 mg) by mouth daily for 14 days Days 1 through 14.    Multiple myeloma not having achieved remission (H)       lisinopril  10 MG tablet    PRINIVIL/ZESTRIL    90 tablet    Take 1 tablet (10 mg) by mouth daily    Benign essential hypertension       LORazepam 0.5 MG tablet    ATIVAN    10 tablet    Take 1 tablet (0.5 mg) by mouth every 8 hours as needed (Anxiety, Nausea/Vomiting or Sleep)    Multiple myeloma not having achieved remission (H)       nitroGLYcerin 0.4 MG sublingual tablet    NITROSTAT    25 tablet    For chest pain place 1 tablet under the tongue every 5 minutes for 3 doses. If symptoms persist 5 minutes after 1st dose call 911.    Atypical chest pain       * order for DME     1 Units    Please dispense one wheel chair    Multiple myeloma not having achieved remission (H)       * order for DME     1 Units    Dispense one 4 wheeled walker with hand brakes and seat    Multiple myeloma not having achieved remission (H)       * order for DME     1 Units    Wheelchair.    Need for assistance due to unsteady gait       prochlorperazine 10 MG tablet    COMPAZINE    30 tablet    Take 1 tablet (10 mg) by mouth every 6 hours as needed (Nausea/Vomiting)    Multiple myeloma not having achieved remission (H)       * Notice:  This list has 3 medication(s) that are the same as other medications prescribed for you. Read the directions carefully, and ask your doctor or other care provider to review them with you.

## 2017-10-10 NOTE — PLAN OF CARE
Problem: Patient Care Overview  Goal: Plan of Care/Patient Progress Review  Outcome: Improving  VS stable, lethargic but arouse easily, oriented.  Chemo medication was her own, being verified with pharmacist, will be given with chemo RN.  Pt voided adequately, eating very little.  Family at bedside.  Anticipate home tomorrow if pt is stable.

## 2017-10-10 NOTE — PLAN OF CARE
Problem: Patient Care Overview  Goal: Discharge Needs Assessment  Outcome: No Change  PRIMARY DIAGNOSIS: GENERALIZED WEAKNESS     OUTPATIENT/OBSERVATION GOALS TO BE MET BEFORE DISCHARGE  1. Orthostatic performed: N/A     2. Tolerating PO medications: Yes     3. Return to near baseline physical activity: No     4. Cleared for discharge by consultants (if involved): No     Discharge Planner Nurse   Safe discharge environment identified: No  Barriers to discharge: Yes          Entered by: Chelly Fenton 10/10/2017 2:42 AM     Please review provider order for any additional goals.   Nurse to notify provider when observation goals have been met and patient is ready for discharge.

## 2017-10-10 NOTE — PLAN OF CARE
"Problem: Patient Care Overview  Goal: Discharge Needs Assessment  Outcome: No Change  PRIMARY DIAGNOSIS: \"GENERIC\" NURSING  OUTPATIENT/OBSERVATION GOALS TO BE MET BEFORE DISCHARGE:  1. ADLs back to baseline: No      2. Activity and level of assistance: Up with standby assistance.      3. Pain status: Pain free.      4. Return to near baseline physical activity: No          Discharge Planner Nurse   Safe discharge environment identified: No  Barriers to discharge: Yes  Patient has been resting well.  Continues somewhat tired after taking ativan 5 mg by mistake yesterday.  On chemo precautions.  Telemetry is 100% A-paced.  She is up to the commode for voiding as alittle unsteady.       Entered by: Chelly Fenton 10/10/2017 5:25 AM     Please review provider order for any additional goals.   Nurse to notify provider when observation goals have been met and patient is ready for discharge.      "

## 2017-10-10 NOTE — TELEPHONE ENCOUNTER
Clinic Action Needed:No  Reason for Call: Daughter in law calling on behalf of family.  Quiana was taken to ER due to accidental overdose of Ativan, she took 10 tabs of Ativan vs 10 tabs of Decadron which she should have taken.  Quiana is admitted into the hospital at [facility] and the hospitalist managing her care wants to start a blood thinner.  Family wants to speak to Dr. Parry prior to agreeing with plan of care.  Paged on call provider for [facility] to speak to caller at [phone]. Dr. Parry is on call, page sent @ 7:37 pm via smart web.     Routed to: Not routed.    La Nena Durant, RN  [facility]

## 2017-10-10 NOTE — DISCHARGE SUMMARY
[facility]    Discharge Summary  Hospitalist    Date of Admission:  10/9/2017  Date of Discharge:  10/10/2017  Discharging Provider: JoAnna K. Barthell, PA-C    Discharge Diagnoses   Accidental benzodiazepine overdose.    History of Present Illness   Quiana Dunaway is an 82 year old female with history of multiple myeloma and HTN who presented after an accidental overdose of benzodiazepine. Patient was supposed to take 10 Decadron tablets, but accidentally took 10 tablets of Ativan 0.5mg.    For complete details of admission, please see H&P by Dr. Hernandez from 10/10/2017.    Hospital Course   Quiana Dunaway was admitted on 10/9/2017.  The following problems were addressed during her hospitalization:    Benzodiazepine Overdose, Accidental.  Patient accidentally took 10 tablets of 0.5 mg Ativan today.  She was supposed to take 10 Decadron tablets but took her Ativan instead.  Patient has been hemodynamically stable. Basic labs stable. Easily arousal at time of admission and asking to leave on day of discharge demonstrating A&O x 3 and steady, independent ambulation.     Multiple Myeloma.  Follows with Dr. Parry.  - Continues on PTA Revlimad.  - Missed yesterdays CTX injection.  - Did not take Decadron on 10/9 d/t above. Resume on day of next CTX.  - To follow up with Dr. Parry today as previously scheduled.    Left Lower Extremity Swelling.  Noted today. No tenderness. Left lower extremity u/s negative.     HTN.  - Continues on PTA Lisinopril.     This patient was discussed with Dr. Brandyn Hernandez of the Hospitalist Service who agrees with current plans as outlined above.    JoAnna K. Barthell, PA-C    Significant Results and Procedures   As below.    Code Status   Full Code       Primary Care Physician   César Chung    Physical Exam   Temp: 98.7  F (37.1  C) Temp src: Oral BP: 134/53 Pulse: 63 Heart Rate: 64 Resp: 18 SpO2: 94 % O2 Device: None (Room air) Oxygen Delivery: 3 LPM  Vitals:     10/09/17 1234 10/09/17 1448   Weight: 76.7 kg (169 lb) 77.1 kg (169 lb 14.4 oz)     Vital Signs with Ranges  Temp:  [97.1  F (36.2  C)-98.7  F (37.1  C)] 98.7  F (37.1  C)  Pulse:  [62-86] 63  Heart Rate:  [61-66] 64  Resp:  [14-18] 18  BP: (108-141)/(40-76) 134/53  SpO2:  [94 %-98 %] 94 %  I/O last 3 completed shifts:  In: 120 [P.O.:120]  Out: 700 [Urine:700]  Constitutional: Pleasant female who appears stated age. Looks comfortable resting in bed and later walking around unit.  Respiratory: Breath sounds CTA. No increased work of breathing.  Cardiovascular: RRR, no rub or murmur. No peripheral edema.  GI: Soft, non-tender, non-distended. Bowel sounds present.  Skin/Integumen: Warm, dry, no rashes or lesions.    Discharge Disposition   Discharged to home  Condition at discharge: Stable    Consultations This Hospital Stay   None    Time Spent on this Encounter   I, JoAnna K. Barthell, personally saw the patient today and spent less than or equal to 30 minutes discharging this patient.    Discharge Orders     Reason for your hospital stay   Accidental Ativan overdose.     Follow-up and recommended labs and tests    Follow up with Dr. Parry as already scheduled.     Activity   Your activity upon discharge: activity as tolerated     Full Code     Diet   Follow this diet upon discharge:    Regular Diet Adult       Discharge Medications   Current Discharge Medication List      CONTINUE these medications which have NOT CHANGED    Details   ACYCLOVIR PO Take 400 mg by mouth 2 times daily      LENalidomide (REVLIMID) 10 MG CAPS capsule CHEMO Take 1 capsule (10 mg) by mouth daily for 14 days Days 1 through 14.  Qty: 14 capsule, Refills: 0    [facility] [id number] Obtained on 9/21/17  Associated Diagnoses: Multiple myeloma not having achieved remission (H)      lisinopril (PRINIVIL/ZESTRIL) 10 MG tablet Take 1 tablet (10 mg) by mouth daily  Qty: 90 tablet, Refills: 3    Associated Diagnoses: Benign essential  hypertension      LORazepam (ATIVAN) 0.5 MG tablet Take 1 tablet (0.5 mg) by mouth every 4 hours as needed (Anxiety, Nausea/Vomiting or Sleep)  Qty: 30 tablet, Refills: 3    Associated Diagnoses: Multiple myeloma not having achieved remission (H)      prochlorperazine (COMPAZINE) 10 MG tablet Take 1 tablet (10 mg) by mouth every 6 hours as needed (Nausea/Vomiting)  Qty: 30 tablet, Refills: 3    Associated Diagnoses: Multiple myeloma not having achieved remission (H)      calcium carbonate-vitamin D 600-400 MG-UNIT CHEW Take 1 chew tab by mouth 2 times daily  Qty: 180 tablet, Refills: 3    Associated Diagnoses: Multiple myeloma not having achieved remission (H)      nitroglycerin (NITROSTAT) 0.4 MG sublingual tablet For chest pain place 1 tablet under the tongue every 5 minutes for 3 doses. If symptoms persist 5 minutes after 1st dose call 911.  Qty: 25 tablet, Refills: 0    Associated Diagnoses: Atypical chest pain      aspirin 81 MG chewable tablet Take 81 mg by mouth daily       !! order for DME Wheelchair.  Qty: 1 Units, Refills: 0    Associated Diagnoses: Need for assistance due to unsteady gait      dexamethasone (DECADRON) 4 MG tablet Take 10 tablets (40 mg) by mouth every 7 days for 3 doses Days 1, 8, and 15.  Qty: 30 tablet, Refills: 0    Associated Diagnoses: Multiple myeloma not having achieved remission (H)      !! order for DME Please dispense one wheel chair  Qty: 1 Units, Refills: 0    Associated Diagnoses: Multiple myeloma not having achieved remission (H)      !! order for DME Dispense one 4 wheeled walker with hand brakes and seat  Qty: 1 Units, Refills: 0    Associated Diagnoses: Multiple myeloma not having achieved remission (H)       !! - Potential duplicate medications found. Please discuss with provider.        Allergies   No Known Allergies  Data   Most Recent 3 CBC's:  Recent Labs   Lab Test  10/09/17   1145  10/02/17   1413  09/25/17   1403   WBC  3.3*  4.9  2.5*   HGB  10.6*  10.5*  10.3*    MCV  91  91  91   PLT  174  142*  131*      Most Recent 3 BMP's:  Recent Labs   Lab Test  10/09/17   1145  10/02/17   1413  09/05/17   1420   NA  140  141  140   POTASSIUM  3.4  3.3*  3.5   CHLORIDE  106  107  106   CO2  29  28  29   BUN  17  21  24   CR  0.64  0.85  0.73   ANIONGAP  5  6  5   HUMBERTO  8.3*  8.5  9.0   GLC  134*  105*  127*     Most Recent 2 LFT's:  Recent Labs   Lab Test  10/09/17   1145  10/02/17   1413   AST  12  11   ALT  21  17   ALKPHOS  109  118   BILITOTAL  0.4  0.4     Most Recent INR's and Anticoagulation Dosing History:  Anticoagulation Dose History     There is no flowsheet data to display.        Most Recent 3 Troponin's:  Recent Labs   Lab Test  04/10/17   1437  03/06/17   1557  03/04/17   2120   05/27/10   1541  05/26/10   2225   TROPI  <0.015  The 99th percentile for upper reference range is 0.045 ug/L.  Troponin values in   the range of 0.045 - 0.120 ug/L may be associated with risks of adverse   clinical events.    <0.015  The 99th percentile for upper reference range is 0.045 ug/L.  Troponin values in   the range of 0.045 - 0.120 ug/L may be associated with risks of adverse   clinical events.    <0.015  The 99th percentile for upper reference range is 0.045 ug/L.  Troponin values in   the range of 0.045 - 0.120 ug/L may be associated with risks of adverse   clinical events.     < >   --    --    TROPONIN   --    --    --    --   0.00  0.00    < > = values in this interval not displayed.     Most Recent Cholesterol Panel:  Recent Labs   Lab Test 02/09/15   CHOL  176   LDL  75   HDL  82   TRIG  93     Most Recent 6 Bacteria Isolates From Any Culture (See EPIC Reports for Culture Details):No lab results found.  Most Recent TSH, T4 and A1c Labs:  Recent Labs   Lab Test  06/21/17   1421   TSH  0.07*   T4  1.17     Results for orders placed or performed during the hospital encounter of 10/09/17   US Lower Extremity Venous Duplex Left    Narrative    VENOUS ULTRASOUND LEFT LOWER EXTREMITY   10/9/2017 5:55 PM     HISTORY: Swelling.    COMPARISON: None.    FINDINGS:  Examination of the deep veins with graded compression and  color flow Doppler with spectral wave form analysis shows no evidence  of thrombus in the common femoral vein, femoral vein, popliteal vein  or calf veins.  There is no venous insufficiency.      Impression    IMPRESSION: No evidence of deep venous thrombosis.       FERNANDO MICHAUD MD

## 2017-10-11 ENCOUNTER — TELEPHONE (OUTPATIENT)
Dept: FAMILY MEDICINE | Facility: CLINIC | Age: 82
End: 2017-10-11

## 2017-10-11 RX ORDER — MEPERIDINE HYDROCHLORIDE 50 MG/ML
25 INJECTION INTRAMUSCULAR; INTRAVENOUS; SUBCUTANEOUS EVERY 30 MIN PRN
Status: CANCELLED | OUTPATIENT
Start: 2017-10-16

## 2017-10-11 RX ORDER — ALBUTEROL SULFATE 0.83 MG/ML
2.5 SOLUTION RESPIRATORY (INHALATION)
Status: CANCELLED | OUTPATIENT
Start: 2017-10-16

## 2017-10-11 RX ORDER — LORAZEPAM 2 MG/ML
0.5 INJECTION INTRAMUSCULAR EVERY 4 HOURS PRN
Status: CANCELLED
Start: 2017-10-16

## 2017-10-11 RX ORDER — DIPHENHYDRAMINE HYDROCHLORIDE 50 MG/ML
50 INJECTION INTRAMUSCULAR; INTRAVENOUS
Status: CANCELLED
Start: 2017-10-16

## 2017-10-11 RX ORDER — EPINEPHRINE 1 MG/ML
0.3 INJECTION, SOLUTION INTRAMUSCULAR; SUBCUTANEOUS EVERY 5 MIN PRN
Status: CANCELLED | OUTPATIENT
Start: 2017-10-16

## 2017-10-11 RX ORDER — SODIUM CHLORIDE 9 MG/ML
1000 INJECTION, SOLUTION INTRAVENOUS CONTINUOUS PRN
Status: CANCELLED
Start: 2017-10-16

## 2017-10-11 RX ORDER — METHYLPREDNISOLONE SODIUM SUCCINATE 125 MG/2ML
125 INJECTION, POWDER, LYOPHILIZED, FOR SOLUTION INTRAMUSCULAR; INTRAVENOUS
Status: CANCELLED
Start: 2017-10-16

## 2017-10-11 RX ORDER — EPINEPHRINE 0.3 MG/.3ML
0.3 INJECTION SUBCUTANEOUS EVERY 5 MIN PRN
Status: CANCELLED | OUTPATIENT
Start: 2017-10-16

## 2017-10-11 RX ORDER — ALBUTEROL SULFATE 90 UG/1
1-2 AEROSOL, METERED RESPIRATORY (INHALATION)
Status: CANCELLED
Start: 2017-10-16

## 2017-10-11 NOTE — PROGRESS NOTES
HEMATOLOGY HISTORY: Mrs. Dunaway is a lady with multiple myeloma (IgG kappa). High risk, t(14;16).  1. She was evaluated for pancytopenia. On 05/01/2017:  -WBC of 3.4, hemoglobin of 10.2 and platelet of 154.    -CMP, Vitamin B12, folate, iron, ferritin, TSH aand haptoglobin are all normal.  2.  CT chest angiogram on 03/07/2017 did not reveal pulmonary embolism.  There is left thyroid nodule measuring 2.6 cm.  There is a 1.2 cm cystic appearing lesion in pancreatic tail.  It could be IPMN.  No splenomegaly.  3.  Colonoscopy on 04/28/2017 revealed colon polyps. Pathology revealed tubular adenoma.  No high-grade dysplasia.  4.  SPEP on 07/07/2017 reveals M-spike of 1.8.  5. Bone marrow biopsy on 07/12/2017 reveals hypercellular bone marrow with kappa monotypic plasma cell population consistent with multiple myeloma.  Bone marrow is 70% cellular.  Plasma cell is 50-60%.     -There is gain of chromosome 1, 5, 9, 15, and 17.  There is translocation 14;16.   6.  Multiple labs were done on 07/18/2017:  -M-spike of 1.9.   -Immunofixation reveals monoclonal IgG kappa.    -IgG level of 2970.  IgA of 15 and IgM of 175.    -Kappa free light chain of 67.7.  Lambda free light chain of 1.42.  Ratio of kappa to lambda of 47.71.    -Beta 2 microglobulin of 3.4.   7. PET scan on 07/24/2017 reveals several focal areas of increased FDG uptake consistent with multiple myeloma.  There is probable epithelial cyst in tail of the pancreas.  No associated abnormal FDG activity.  Left thyroid cysts or nodules without any FDG activity.  There is a 0.3 cm left upper lobe lung nodule.   8.  Velcade, Revlimid and dexamethasone started on 08/14/2017.  9. On 10/02/2017 (after 2 cycles):  -M-spike of 0.5  -Kappa free light chain of 4.3  -IgG of 717;     SUBJECTIVE:    Ms. Quiana Dunaway is an 82-year-old female with IgG kappa multiple myeloma.  She is currently on Velcade, Revlimid and dexamethasone.  She is responding.  Labs after second cycle on  10/02/2017 reveals M-spike of 0.5, kappa free light chain of 4.3 and IgG of 717; these have significantly improved.      Yesterday, patient accidentally took 5 mg of Ativan.  She was going to take dexamethasone but by mistake she took Ativan.  She was in the hospital for observation. She did well.      Patient overall is doing good well.  No headache.  No dizziness.  No chest pain.  No difficulty breathing.  No abdominal pain, nausea or vomiting.  Appetite is good.  No urinary or bowel complaints.  No bleeding.      PHYSICAL EXAMINATION:   GENERAL:  She is alert and oriented x3.   VITAL SIGNS:  Reviewed.   Rest of the systems not examined.      LABORATORY DATA:  Reviewed.      ASSESSMENT:   1.  An 82-year-old female with IgG kappa multiple myeloma which is responding to Velcade, Revlimid and dexamethasone.   2.  Leukopenia.   3.  Mild anemia.      PLAN:   1.  Overall, patient is doing well from myeloma.  She was happy to know that myeloma is responding.  Will continue her on Velcade, Revlimid and dexamethasone.   She did not get her Velcade yesterday because she was in the hospital.  She will resume it next week.  She will continue on Revlimid and weekly dexamethasone.  So far, she is tolerating it well,   2.  She had a few questions, which were all answered.  She was given a flu shot today.  I will see her in about a month.  Advised her to return to clinic if she has fever, chills, recurrent vomiting, bleeding or any other concerns.         YVES PEREZ MD             D: 10/10/2017 17:04   T: 10/10/2017 23:05   MT: TD      Name:     DUGLAS CASTILLO   MRN:      [id number]        Account:      [id number]   :      1935           Visit Date:   10/10/2017      Document: [id number]

## 2017-10-11 NOTE — TELEPHONE ENCOUNTER
Hospital follow up: [facility] Medication overdue.    Home Phone [phone]   Mobile [phone]

## 2017-10-11 NOTE — TELEPHONE ENCOUNTER
No hospital follow up appointment scheduled.     ED / Discharge Outreach Protocol    Patient Contact    Attempt # 1    Was call answered?  No.  Unable to leave message. Will need to recall.   Grace LIGHT RN      [facility]  Discharge Summary  Hospitalist     Date of Admission:  10/9/2017  Date of Discharge:  10/10/2017  Discharging Provider: JoAnna K. Barthell, PA-C     Discharge Diagnoses  Accidental benzodiazepine overdose.     History of Present Illness  Quiana Dunaway is an 82 year old female with history of multiple myeloma and HTN who presented after an accidental overdose of benzodiazepine. Patient was supposed to take 10 Decadron tablets, but accidentally took 10 tablets of Ativan 0.5mg.  For complete details of admission, please see H&P by Dr. Hernandez from 10/10/2017.     Hospital Course  Quiana Dunaway was admitted on 10/9/2017.  The following problems were addressed during her hospitalization:     Benzodiazepine Overdose, Accidental.  Patient accidentally took 10 tablets of 0.5 mg Ativan today.  She was supposed to take 10 Decadron tablets but took her Ativan instead.  Patient has been hemodynamically stable. Basic labs stable. Easily arousal at time of admission and asking to leave on day of discharge demonstrating A&O x 3 and steady, independent ambulation.     Multiple Myeloma.  Follows with Dr. Parry.  - Continues on PTA Revlimad.  - Missed yesterdays CTX injection.  - Did not take Decadron on 10/9 d/t above. Resume on day of next CTX.  - To follow up with Dr. Parry today as previously scheduled.     Left Lower Extremity Swelling.  Noted today. No tenderness. Left lower extremity u/s negative.      HTN.  - Continues on PTA Lisinopril.      This patient was discussed with Dr. Brandyn Hernandez of the Hospitalist Service who agrees with current plans as outlined above.     Reason for your hospital stay   Accidental Ativan overdose.      Follow-up and recommended labs and tests    Follow  up with Dr. Parry as already scheduled.      Activity   Your activity upon discharge: activity as tolerated      Full Code      Diet   Follow this diet upon discharge:    Regular Diet Adult     Discharge Medications           Current Discharge Medication List            CONTINUE these medications which have NOT CHANGED     Details   ACYCLOVIR PO Take 400 mg by mouth 2 times daily       LENalidomide (REVLIMID) 10 MG CAPS capsule CHEMO Take 1 capsule (10 mg) by mouth daily for 14 days Days 1 through 14.  Qty: 14 capsule, Refills: 0     Comments: REMS Authorization #: [id number] Obtained on 9/21/17  Associated Diagnoses: Multiple myeloma not having achieved remission (H)       lisinopril (PRINIVIL/ZESTRIL) 10 MG tablet Take 1 tablet (10 mg) by mouth daily  Qty: 90 tablet, Refills: 3     Associated Diagnoses: Benign essential hypertension       LORazepam (ATIVAN) 0.5 MG tablet Take 1 tablet (0.5 mg) by mouth every 4 hours as needed (Anxiety, Nausea/Vomiting or Sleep)  Qty: 30 tablet, Refills: 3     Associated Diagnoses: Multiple myeloma not having achieved remission (H)       prochlorperazine (COMPAZINE) 10 MG tablet Take 1 tablet (10 mg) by mouth every 6 hours as needed (Nausea/Vomiting)  Qty: 30 tablet, Refills: 3     Associated Diagnoses: Multiple myeloma not having achieved remission (H)       calcium carbonate-vitamin D 600-400 MG-UNIT CHEW Take 1 chew tab by mouth 2 times daily  Qty: 180 tablet, Refills: 3     Associated Diagnoses: Multiple myeloma not having achieved remission (H)       nitroglycerin (NITROSTAT) 0.4 MG sublingual tablet For chest pain place 1 tablet under the tongue every 5 minutes for 3 doses. If symptoms persist 5 minutes after 1st dose call 911.  Qty: 25 tablet, Refills: 0     Associated Diagnoses: Atypical chest pain       aspirin 81 MG chewable tablet Take 81 mg by mouth daily        !! order for DME Wheelchair.  Qty: 1 Units, Refills: 0     Associated Diagnoses: Need for assistance due to  unsteady gait       dexamethasone (DECADRON) 4 MG tablet Take 10 tablets (40 mg) by mouth every 7 days for 3 doses Days 1, 8, and 15.  Qty: 30 tablet, Refills: 0     Associated Diagnoses: Multiple myeloma not having achieved remission (H)       !! order for DME Please dispense one wheel chair  Qty: 1 Units, Refills: 0     Associated Diagnoses: Multiple myeloma not having achieved remission (H)       !! order for DME Dispense one 4 wheeled walker with hand brakes and seat  Qty: 1 Units, Refills: 0     Associated Diagnoses: Multiple myeloma not having achieved remission (H)        !! - Potential duplicate medications found. Please discuss with provider.

## 2017-10-12 NOTE — TELEPHONE ENCOUNTER
ED / Discharge Outreach Protocol    Patient Contact    Attempt # 2    Was call answered?  No.  Unable to leave message.    Marly RDZ RN

## 2017-10-16 ENCOUNTER — INFUSION THERAPY VISIT (OUTPATIENT)
Dept: INFUSION THERAPY | Facility: CLINIC | Age: 82
End: 2017-10-16
Attending: INTERNAL MEDICINE
Payer: MEDICARE

## 2017-10-16 ENCOUNTER — TELEPHONE (OUTPATIENT)
Dept: CARDIOLOGY | Facility: CLINIC | Age: 82
End: 2017-10-16

## 2017-10-16 ENCOUNTER — DOCUMENTATION ONLY (OUTPATIENT)
Dept: PHARMACY | Facility: CLINIC | Age: 82
End: 2017-10-16

## 2017-10-16 ENCOUNTER — HOSPITAL ENCOUNTER (OUTPATIENT)
Facility: CLINIC | Age: 82
Setting detail: SPECIMEN
Discharge: HOME | End: 2017-10-16
Attending: INTERNAL MEDICINE | Admitting: INTERNAL MEDICINE
Payer: MEDICARE

## 2017-10-16 VITALS
HEIGHT: 63 IN | WEIGHT: 167.2 LBS | SYSTOLIC BLOOD PRESSURE: 132 MMHG | DIASTOLIC BLOOD PRESSURE: 57 MMHG | TEMPERATURE: 98.7 F | RESPIRATION RATE: 16 BRPM | HEART RATE: 66 BPM | BODY MASS INDEX: 29.62 KG/M2

## 2017-10-16 DIAGNOSIS — C90.00 MULTIPLE MYELOMA NOT HAVING ACHIEVED REMISSION (H): Primary | ICD-10-CM

## 2017-10-16 LAB
BASOPHILS # BLD AUTO: 0.1 10E9/L (ref 0–0.2)
BASOPHILS NFR BLD AUTO: 1.3 %
DIFFERENTIAL METHOD BLD: ABNORMAL
EOSINOPHIL # BLD AUTO: 0.3 10E9/L (ref 0–0.7)
EOSINOPHIL NFR BLD AUTO: 6.9 %
ERYTHROCYTE [DISTWIDTH] IN BLOOD BY AUTOMATED COUNT: 14 % (ref 10–15)
HCT VFR BLD AUTO: 34.3 % (ref 35–47)
HGB BLD-MCNC: 11.5 G/DL (ref 11.7–15.7)
IMM GRANULOCYTES # BLD: 0 10E9/L (ref 0–0.4)
IMM GRANULOCYTES NFR BLD: 0.4 %
LYMPHOCYTES # BLD AUTO: 1.3 10E9/L (ref 0.8–5.3)
LYMPHOCYTES NFR BLD AUTO: 27.1 %
MCH RBC QN AUTO: 30.4 PG (ref 26.5–33)
MCHC RBC AUTO-ENTMCNC: 33.5 G/DL (ref 31.5–36.5)
MCV RBC AUTO: 91 FL (ref 78–100)
MONOCYTES # BLD AUTO: 0.7 10E9/L (ref 0–1.3)
MONOCYTES NFR BLD AUTO: 13.8 %
NEUTROPHILS # BLD AUTO: 2.4 10E9/L (ref 1.6–8.3)
NEUTROPHILS NFR BLD AUTO: 50.5 %
NRBC # BLD AUTO: 0 10*3/UL
NRBC BLD AUTO-RTO: 0 /100
PLATELET # BLD AUTO: 341 10E9/L (ref 150–450)
RBC # BLD AUTO: 3.78 10E12/L (ref 3.8–5.2)
WBC # BLD AUTO: 4.8 10E9/L (ref 4–11)

## 2017-10-16 PROCEDURE — 96401 CHEMO ANTI-NEOPL SQ/IM: CPT

## 2017-10-16 PROCEDURE — 25000128 H RX IP 250 OP 636: Mod: JW | Performed by: INTERNAL MEDICINE

## 2017-10-16 PROCEDURE — 36415 COLL VENOUS BLD VENIPUNCTURE: CPT

## 2017-10-16 PROCEDURE — 85025 COMPLETE CBC W/AUTO DIFF WBC: CPT | Performed by: INTERNAL MEDICINE

## 2017-10-16 RX ADMIN — BORTEZOMIB 2.7 MG: 3.5 INJECTION, POWDER, LYOPHILIZED, FOR SOLUTION INTRAVENOUS; SUBCUTANEOUS at 14:01

## 2017-10-16 ASSESSMENT — PAIN SCALES - GENERAL: PAINLEVEL: NO PAIN (0)

## 2017-10-16 NOTE — PROGRESS NOTES
"Oral Chemotherapy Monitoring Program    Primary Oncologist: Dr. Parry  Primary Oncology Clinic: [facility]  Cancer Diagnosis: Multiple Myeloma    Therapy History:  8/14/17    Drug Interaction Assessment: none    Lab Monitoring Plan  Monitoring plan: (for myeloma) Pregnancy: Pre-phase (10-14 days before therapy), within 24 hours before therapy, C1D8, C1D15, C1D22, C2D1, C3D1, C4D1, C5D1, C6D1   C1D1+   CMP, CBC, Preg C2D1+ Call, CMP, CBC, Preg C3D1+ Call, CMP, CBC, Preg C4D1+ Call, CMP, CBC, Preg C5D1+ Call, CMP, CBC, Preg C6D1+ Call, CMP, CBC, Preg   C1D8+ Preg C2D8+    C3D8+    C4D8+    C5D8+    C6D8+      C1D15+ Call, CBC, Preg C2D15+ CBC C3D15+ CBC C4D15+    C5D15+    C6D15+      C1D22+ Preg C2D22+    C3D22+    C4D22+    C5D22+    C6D22+      CBC Q2 weeks for first 3 months, then monthly  CMP monthly  Pregnancy test weekly for first month, then monthly if indicated       Subjective/Objective:  Quiana Dunaway is a 82 year old female seen in clinic for a follow-up visit for oral chemotherapy.  Spoke with Quiana in infusion today. She is doing well. Today is the first day of her off week. She notes that she is still worn out and fatigued but that it is \"the same old same old.\" She understands that it is a side effect of the medication and is trying to do her best to keep her activities and energy up. She denies any other side effects at this time. Quiana will restart the next cycle on 10/23. (Delete D15 of this cycle to get Revlimid start on D1 of plan. Dr. Parry agrees to plan.)    ORAL CHEMOTHERAPY 8/14/2017 9/18/2017 10/16/2017   Drug Name Revlimid (Lenalidomide) Revlimid (Lenalidomide) Revlimid (Lenalidomide)   Current Dosage 10mg 10mg 10mg   Current Schedule Daily Daily Daily   Cycle Details 2 weeks on 1 week off 2 weeks on 1 week off 2 weeks on 1 week off   Start Date of Last Cycle 8/14/2017 9/5/2017 10/2/2017   Planned next cycle start date - 10/2/2017 10/23/2017   Doses missed in last 2 weeks - more 0 " "  Adherence Assessment - Adherent Adherent   Adverse Effects - Fatigue Fatigue   Any new drug interactions? - No No   Is the dose as ordered appropriate for the patient? - No No       Last PHQ-2 Score on record:   PHQ-2 ( 1999 Pfizer) 9/20/2016   Q1: Little interest or pleasure in doing things 0   Q2: Feeling down, depressed or hopeless 0   PHQ-2 Score 0       Patient does not report depression symptoms.      Vitals:  BP:   BP Readings from Last 1 Encounters:   10/16/17 132/57     Wt Readings from Last 1 Encounters:   10/16/17 75.8 kg (167 lb 3.2 oz)     Estimated body surface area is 1.84 meters squared as calculated from the following:    Height as of an earlier encounter on 10/16/17: 1.6 m (5' 2.99\").    Weight as of an earlier encounter on 10/16/17: 75.8 kg (167 lb 3.2 oz).    Labs:  Lab Results   Component Value Date     10/09/2017      Lab Results   Component Value Date    POTASSIUM 3.4 10/09/2017     Lab Results   Component Value Date    HUMBERTO 8.3 10/09/2017     Lab Results   Component Value Date    ALBUMIN 3.3 10/09/2017     No results found for: MAG  No results found for: PHOS  Lab Results   Component Value Date    BUN 17 10/09/2017     Lab Results   Component Value Date    CR 0.64 10/09/2017       Lab Results   Component Value Date    AST 12 10/09/2017     Lab Results   Component Value Date    ALT 21 10/09/2017     Lab Results   Component Value Date    BILITOTAL 0.4 10/09/2017       Lab Results   Component Value Date    WBC 4.8 10/16/2017     Lab Results   Component Value Date    HGB 11.5 10/16/2017     Lab Results   Component Value Date     10/16/2017     Lab Results   Component Value Date    ANEU 2.4 10/16/2017           Assessment:  Patient is tolerating Revlimid well at this time. She has fatigue but is managing this well.    Plan:  Continue RVD. Restart Revlimid on 10/23.    Follow-Up:  3 weeks with repeat labs    Refill Due:  10/23/17 and then 11/13/17    Debra Matos PharmD  October 16, " 2017

## 2017-10-16 NOTE — MR AVS SNAPSHOT
After Visit Summary   10/16/2017    Quiana Dunaway    MRN: [id number]           Patient Information     Date Of Birth          1935        Visit Information        Provider Department      10/16/2017 1:00 PM  INFUSION CHAIR 8 [facility]        Today's Diagnoses     Multiple myeloma not having achieved remission (H)    -  1       Follow-ups after your visit        Your next 10 appointments already scheduled     Oct 17, 2017  2:00 PM CDT   Office Visit with Effie Chase PA-C   [facility] ([facility])    [address] [facility] [address] [phone]           Bring a current list of meds and any records pertaining to this visit. For Physicals, please bring immunization records and any forms needing to be filled out. Please arrive 10 minutes early to complete paperwork.            Oct 23, 2017  2:00 PM CDT   Level 2 with  INFUSION CHAIR 14   [facility] ([facility])    [facility]  [address]  [facility] [address]   [phone]            Oct 30, 2017  2:00 PM CDT   Level 2 with  INFUSION CHAIR 7   [facility] ([facility])    [facility]  [address]  [facility] [address] [phone]            Oct 30, 2017  4:30 PM CDT   Courtesy Device Check with RIOS DCR2   [facility]YSIC[facility] ([facility])    [address]  [place] [address] [phone]            Nov 06, 2017  1:30 PM CST   Return Visit with Alex Parry MD   [facility] ([facility])    [facility]  [address]  [facility] [address] [phone]            Nov 06, 2017  2:00 PM CST   Level 2 with  INFUSION CHAIR 19   [facility] ([facility])    [facility] [place]  [address]  [place] [address]   [phone]            Nov 13, 2017  2:00 PM CST   Level 2 with SH INFUSION CHAIR 12   [facility] ([facility])    [facility] [place]  [address]  [place] [address]   [phone]            Nov 20, 2017  2:00 PM CST   Level 2 with SH INFUSION CHAIR 2   [facility] ([facility])    [facility]  [address]  [place] [address]   [phone]            Nov 27, 2017  2:00 PM CST   Level 2 with SH INFUSION CHAIR 2   [facility] ([facility])    [facility]  [address]  [place] [address]   [phone]            Dec 06, 2017  2:00 PM CST   Remote PPM Check with RIOS TECH1   [facility] [place] ([facility])    [address]  [place] [address] [phone]           This appointment is for a remote check of your pacemaker.  This is not an appointment at the office.              Who to contact     If you have questions or need follow up information about today's clinic visit or your schedule please contact [facility] [place] directly at [phone].  Normal or non-critical lab and imaging results will be communicated to you by HealthPockethart, letter or phone within 4 business days after the clinic has received the results. If you do not hear from us within 7 days, please contact the clinic through HealthPockethart or phone. If you have a critical or abnormal lab result, we will notify you by phone as soon as possible.  Submit refill requests through [email or web address] or call your pharmacy and they will forward the refill request to us. Please allow 3 business days for your refill to be completed.          Additional  "Information About Your Visit        MyChart Information     Velteo lets you send messages to your doctor, view your test results, renew your prescriptions, schedule appointments and more. To sign up, go to [email or web address] . Click on \"Log in\" on the left side of the screen, which will take you to the Welcome page. Then click on \"Sign up Now\" on the right side of the page.     You will be asked to enter the access code listed below, as well as some personal information. Please follow the directions to create your username and password.     Your access code is: [email or web address]  Expires: 2017  3:33 PM     Your access code will  in 90 days. If you need help or a new code, please call your [facility] clinic or [phone].        Care EveryWhere ID     This is your Care EveryWhere ID. This could be used by other organizations to access your [facility] medical records  [id number]        Your Vitals Were     Pulse Temperature Respirations Height BMI (Body Mass Index)       66 98.7  F (37.1  C) (Oral) 16 1.6 m (5' 2.99\") 29.63 kg/m2        Blood Pressure from Last 3 Encounters:   10/16/17 132/57   10/10/17 138/81   10/10/17 134/53    Weight from Last 3 Encounters:   10/16/17 75.8 kg (167 lb 3.2 oz)   10/10/17 75.3 kg (166 lb)   10/09/17 77.1 kg (169 lb 14.4 oz)              We Performed the Following     CBC with platelets differential        Primary Care Provider Office Phone # Fax #    César Chung [phone]       [facility] [place] [address]  [place] [address]        Equal Access to Services     LILLIANA SANDOVAL : Stacie Contreras, chance escobar, nelda kaalmada miriam, phoenix villeda. So [facility] [phone].    ATENCIÓN: Si habla español, tiene a contreras disposición servicios gratuitos de asistencia lingüística. Llame al [phone].    We comply with applicable federal civil rights laws and [place] laws. We do not discriminate " on the basis of race, color, national origin, age, disability, sex, sexual orientation, or gender identity.            Thank you!     Thank you for choosing [facility] AND [facility]  for your care. Our goal is always to provide you with excellent care. Hearing back from our patients is one way we can continue to improve our services. Please take a few minutes to complete the written survey that you may receive in the mail after your visit with us. Thank you!             Your Updated Medication List - Protect others around you: Learn how to safely use, store and throw away your medicines at www.disposemymeds.org.          This list is accurate as of: 10/16/17  2:06 PM.  Always use your most recent med list.                   Brand Name Dispense Instructions for use Diagnosis    ACYCLOVIR PO      Take 400 mg by mouth 2 times daily        aspirin 81 MG chewable tablet      Take 81 mg by mouth daily        calcium carbonate-vitamin D 600-400 MG-UNIT Chew     180 tablet    Take 1 chew tab by mouth 2 times daily    Multiple myeloma not having achieved remission (H)       dexamethasone 4 MG tablet    DECADRON    30 tablet    Take 10 tablets (40 mg) by mouth every 7 days for 3 doses Days 1, 8, and 15.    Multiple myeloma not having achieved remission (H)       LENalidomide 10 MG Caps capsule CHEMO    REVLIMID    14 capsule    Take 1 capsule (10 mg) by mouth daily for 14 days Days 1 through 14.    Multiple myeloma not having achieved remission (H)       lisinopril 10 MG tablet    PRINIVIL/ZESTRIL    90 tablet    Take 1 tablet (10 mg) by mouth daily    Benign essential hypertension       LORazepam 0.5 MG tablet    ATIVAN    10 tablet    Take 1 tablet (0.5 mg) by mouth every 8 hours as needed (Anxiety, Nausea/Vomiting or Sleep)    Multiple myeloma not having achieved remission (H)       nitroGLYcerin 0.4 MG sublingual tablet    NITROSTAT    25 tablet    For chest pain place 1 tablet under the tongue every 5  minutes for 3 doses. If symptoms persist 5 minutes after 1st dose call 911.    Atypical chest pain       * order for DME     1 Units    Please dispense one wheel chair    Multiple myeloma not having achieved remission (H)       * order for DME     1 Units    Dispense one 4 wheeled walker with hand brakes and seat    Multiple myeloma not having achieved remission (H)       * order for DME     1 Units    Wheelchair.    Need for assistance due to unsteady gait       prochlorperazine 10 MG tablet    COMPAZINE    30 tablet    Take 1 tablet (10 mg) by mouth every 6 hours as needed (Nausea/Vomiting)    Multiple myeloma not having achieved remission (H)       * Notice:  This list has 3 medication(s) that are the same as other medications prescribed for you. Read the directions carefully, and ask your doctor or other care provider to review them with you.

## 2017-10-16 NOTE — PROGRESS NOTES
Infusion Nursing Note:  Quiana Dunaway presents today for Velcade C3D8.    Patient seen by provider today: NoInterpreter present during visit today: Not Applicable.    Note: N/A.    Intravenous Access:  Lab draw site Right lower forearm, Needle type Butterfly, Gauge 23.  Labs drawn without difficulty.    Treatment Conditions:  Lab Results   Component Value Date    HGB 11.5 10/16/2017     Lab Results   Component Value Date    WBC 4.8 10/16/2017      Lab Results   Component Value Date    ANEU 2.4 10/16/2017     Lab Results   Component Value Date     10/16/2017      Results reviewed, labs MET treatment parameters, ok to proceed with treatment.      Post Infusion Assessment:Patient tolerated injection without incident.  Site patent and intact, free from redness, edema or discomfort.  No evidence of extravasations.    Discharge Plan:   Discharge instructions reviewed with: Patient.  Patient and/or family verbalized understanding of discharge instructions and all questions answered.  Copy of AVS reviewed with patient and/or family.  Patient will return 10/23/2017 for next appointment.  Patient discharged in stable condition accompanied by: self and daughter.  Departure Mode: Ambulatory.    Ammy Thomas RN

## 2017-10-16 NOTE — TELEPHONE ENCOUNTER
Received call from daughter reporting she has been having HAI for last couple of days and it was also noted last weekend as well.  Edema improves when legs elevated.  Patient symptomatic with SOB. Patient currently at infusion center having treatment for her multiple myeloma and nurses at center indicated medication patient is receiving would not cause her to have HAI.  Patient sees cardiology for SSS and has dual chamber PPM. No diagnosis of CHF.  Recommended patient get in to see PMD for evaluation.  If not able to get in and symptoms are persistent to go to ED for evaluation.  FAUSTINO Valerio

## 2017-10-17 ENCOUNTER — RADIANT APPOINTMENT (OUTPATIENT)
Dept: GENERAL RADIOLOGY | Facility: CLINIC | Age: 82
End: 2017-10-17
Attending: PHYSICIAN ASSISTANT
Payer: MEDICARE

## 2017-10-17 ENCOUNTER — OFFICE VISIT (OUTPATIENT)
Dept: FAMILY MEDICINE | Facility: CLINIC | Age: 82
End: 2017-10-17
Payer: MEDICARE

## 2017-10-17 VITALS
HEIGHT: 63 IN | SYSTOLIC BLOOD PRESSURE: 132 MMHG | DIASTOLIC BLOOD PRESSURE: 67 MMHG | BODY MASS INDEX: 29.79 KG/M2 | TEMPERATURE: 99.1 F | HEART RATE: 80 BPM | WEIGHT: 168.1 LBS | OXYGEN SATURATION: 94 %

## 2017-10-17 DIAGNOSIS — I48.91 ATRIAL FIBRILLATION, UNSPECIFIED TYPE (H): ICD-10-CM

## 2017-10-17 DIAGNOSIS — R06.02 SOB (SHORTNESS OF BREATH): ICD-10-CM

## 2017-10-17 DIAGNOSIS — R60.0 BILATERAL LOWER EXTREMITY EDEMA: ICD-10-CM

## 2017-10-17 DIAGNOSIS — R60.0 BILATERAL LOWER EXTREMITY EDEMA: Primary | ICD-10-CM

## 2017-10-17 DIAGNOSIS — C90.00 MULTIPLE MYELOMA NOT HAVING ACHIEVED REMISSION (H): ICD-10-CM

## 2017-10-17 PROCEDURE — 99214 OFFICE O/P EST MOD 30 MIN: CPT | Performed by: PHYSICIAN ASSISTANT

## 2017-10-17 PROCEDURE — 71020 XR CHEST 2 VW: CPT

## 2017-10-17 NOTE — MR AVS SNAPSHOT
After Visit Summary   10/17/2017    Quiana Dunaway    MRN: [id number]           Patient Information     Date Of Birth          1935        Visit Information        Provider Department      10/17/2017 2:00 PM Effie Chase PA-C [facility]        Today's Diagnoses     Bilateral lower extremity edema    -  1    SOB (shortness of breath)        Atrial fibrillation, unspecified type (H)        Multiple myeloma not having achieved remission (H)           Follow-ups after your visit        Your next 10 appointments already scheduled     Oct 23, 2017  2:00 PM CDT   Level 2 with SH INFUSION CHAIR 14   [facility] ([facility])    [facility]  [address]  [place] [address]   [phone]            Oct 30, 2017  2:00 PM CDT   Level 2 with SH INFUSION CHAIR 7   [facility] ([facility])    [facility]  [address]  [place] [address]   [phone]            Oct 30, 2017  4:30 PM CDT   Courtesy Device Check with RIOS DCR2   [facility] [place] ([facility])    [address]  [place] [address]   [phone]            Nov 06, 2017  1:30 PM CST   Return Visit with Alex Parry MD   [facility] ([facility])    [facility]  [address]  [place] [address]   [phone]            Nov 06, 2017  2:00 PM CST   Level 2 with SH INFUSION CHAIR 19   [facility] ([facility])    [facility]  [address]  [place] [address]   [phone]            Nov 13, 2017  2:00 PM CST   Level 2 with SH INFUSION CHAIR 12   [facility] ([facility])    [facility]  [address]  [place] [address]   [phone]            Nov 20, 2017  2:00 PM CST   Level 2 with SH INFUSION CHAIR 2   [facility] ([facility])    [facility] [place]  [address]  [place] [address]   [phone]            Nov 27, 2017  2:00 PM CST   Level 2 with SH INFUSION CHAIR 2   [facility] ([facility])    [facility] [place]  [address]  [place] [address]   [phone]            Dec 06, 2017  2:00 PM CST   Remote PPM Check with RIOS TECH1   [facility] [place] ([facility])    [address]  [place] [address] [phone]           This appointment is for a remote check of your pacemaker.  This is not an appointment at the office.              Future tests that were ordered for you today     Open Future Orders        Priority Expected Expires Ordered    XR Chest 2 Views Routine 10/17/2017 10/17/2018 10/17/2017            Who to contact     If you have questions or need follow up information about today's clinic visit or your schedule please contact [facility] directly at [phone].  Normal or non-critical lab and imaging results will be communicated to you by Presenthart, letter or phone within 4 business days after the clinic has received the results. If you do not hear from us within 7 days, please contact the clinic through Presenthart or phone. If you have a critical or abnormal lab result, we will notify you by phone as soon as possible.  Submit refill requests through Deskarma or call your pharmacy and they will forward the refill request to us. Please allow 3 business days for your refill to be completed.          Additional Information About Your Visit        Deskarma Information     Deskarma lets you send messages to your doctor, view your test results, renew your prescriptions,  "schedule appointments and more. To sign up, go to [email or web address] . Click on \"Log in\" on the left side of the screen, which will take you to the Welcome page. Then click on \"Sign up Now\" on the right side of the page.     You will be asked to enter the access code listed below, as well as some personal information. Please follow the directions to create your username and password.     Your access code is: [email or web address]  Expires: 1/15/2018  2:13 PM     Your access code will  in 90 days. If you need help or a new code, please call your [facility] clinic or [phone].        Care EveryWhere ID     This is your Care EveryWhere ID. This could be used by other organizations to access your [facility] medical records  [id number]        Your Vitals Were     Pulse Temperature Height Pulse Oximetry BMI (Body Mass Index)       80 99.1  F (37.3  C) (Oral) 5' 3\" (1.6 m) 94% 29.78 kg/m2        Blood Pressure from Last 3 Encounters:   10/17/17 132/67   10/16/17 132/57   10/10/17 138/81    Weight from Last 3 Encounters:   10/17/17 168 lb 1.6 oz (76.2 kg)   10/16/17 167 lb 3.2 oz (75.8 kg)   10/10/17 166 lb (75.3 kg)              We Performed the Following     [employer] ACCESS CODE - Add PCP and Click on cosign on right        Primary Care Provider Office Phone # Fax #    César Chung [phone]       [facility] [address]  [place] [address]        Equal Access to Services     LILLIANA SANDOVAL : Hadii althea hogan Sonellie, waaxda luqadaha, qaybta kaalmada adedenzelyada, pho[place] villeda. So [facility] [phone].    ATENCIÓN: Si habla español, tiene a contreras disposición servicios gratuitos de asistencia lingüística. Llame al [phone].    We comply with applicable federal civil rights laws and [place] laws. We do not discriminate on the basis of race, color, national origin, age, disability, sex, sexual orientation, or gender identity.            Thank you!  "    Thank you for choosing [facility]  for your care. Our goal is always to provide you with excellent care. Hearing back from our patients is one way we can continue to improve our services. Please take a few minutes to complete the written survey that you may receive in the mail after your visit with us. Thank you!             Your Updated Medication List - Protect others around you: Learn how to safely use, store and throw away your medicines at www.disposemymeds.org.          This list is accurate as of: 10/17/17  3:01 PM.  Always use your most recent med list.                   Brand Name Dispense Instructions for use Diagnosis    ACYCLOVIR PO      Take 400 mg by mouth 2 times daily        aspirin 81 MG chewable tablet      Take 81 mg by mouth daily        calcium carbonate-vitamin D 600-400 MG-UNIT Chew     180 tablet    Take 1 chew tab by mouth 2 times daily    Multiple myeloma not having achieved remission (H)       dexamethasone 4 MG tablet    DECADRON    30 tablet    Take 10 tablets (40 mg) by mouth every 7 days for 3 doses Days 1, 8, and 15.    Multiple myeloma not having achieved remission (H)       LENalidomide 10 MG Caps capsule CHEMO    REVLIMID    14 capsule    Take 1 capsule (10 mg) by mouth daily for 14 days Days 1 through 14.    Multiple myeloma not having achieved remission (H)       lisinopril 10 MG tablet    PRINIVIL/ZESTRIL    90 tablet    Take 1 tablet (10 mg) by mouth daily    Benign essential hypertension       LORazepam 0.5 MG tablet    ATIVAN    10 tablet    Take 1 tablet (0.5 mg) by mouth every 8 hours as needed (Anxiety, Nausea/Vomiting or Sleep)    Multiple myeloma not having achieved remission (H)       nitroGLYcerin 0.4 MG sublingual tablet    NITROSTAT    25 tablet    For chest pain place 1 tablet under the tongue every 5 minutes for 3 doses. If symptoms persist 5 minutes after 1st dose call 911.    Atypical chest pain       * order for DME     1 Units    Please dispense  one wheel chair    Multiple myeloma not having achieved remission (H)       * order for DME     1 Units    Dispense one 4 wheeled walker with hand brakes and seat    Multiple myeloma not having achieved remission (H)       * order for DME     1 Units    Wheelchair.    Need for assistance due to unsteady gait       prochlorperazine 10 MG tablet    COMPAZINE    30 tablet    Take 1 tablet (10 mg) by mouth every 6 hours as needed (Nausea/Vomiting)    Multiple myeloma not having achieved remission (H)       * Notice:  This list has 3 medication(s) that are the same as other medications prescribed for you. Read the directions carefully, and ask your doctor or other care provider to review them with you.

## 2017-10-17 NOTE — NURSING NOTE
"Chief Complaint   Patient presents with     Swelling     feet       Initial /67 (BP Location: Right arm, Patient Position: Chair, Cuff Size: Adult Regular)  Pulse 80  Temp 99.1  F (37.3  C) (Oral)  Ht 5' 3\" (1.6 m)  Wt 168 lb 1.6 oz (76.2 kg)  SpO2 94%  BMI 29.78 kg/m2 Estimated body mass index is 29.78 kg/(m^2) as calculated from the following:    Height as of this encounter: 5' 3\" (1.6 m).    Weight as of this encounter: 168 lb 1.6 oz (76.2 kg).  Medication Reconciliation: complete   Haydee Galarza MA  "

## 2017-10-17 NOTE — PROGRESS NOTES
HPI: Quiana is a sharp 83 yo female here with her granddtr Suzan today for LE edema.  This comes and goes and today doesn't have any edema.  Edema alleviated with leg elevation  Denies leg redness or pain  She is on Revlimid which can cause edema.  She has had LE edema which is new in the past month  She has Afib, multiple myeloma, heart block s/p pacemaker  She had a LE u/s for the swelling on 10/9/17 neg for DVT  She was in the ED that day for accidental overdose.  She had recent accidental overdose of ativan (took 10 instead of taking 10 decadron)  She checks her weight at home and that has been stable.  Denies any hx of CHF    Past Medical History:   Diagnosis Date     HTN (hypertension)      Multiple myeloma not having achieved remission (H) 7/18/2017     Pacemaker      Pancytopenia (H) 6/28/2017     Past Surgical History:   Procedure Laterality Date     BONE MARROW BIOPSY, BONE SPECIMEN, NEEDLE/TROCAR N/A 7/12/2017    Procedure: BIOPSY BONE MARROW;  BONE MARROW BIOPSY ;  Surgeon: Grace Vela MD;  Location:  GI     Social History   Substance Use Topics     Smoking status: Never Smoker     Smokeless tobacco: Never Used     Alcohol use No     Current Outpatient Prescriptions   Medication Sig Dispense Refill     LORazepam (ATIVAN) 0.5 MG tablet Take 1 tablet (0.5 mg) by mouth every 8 hours as needed (Anxiety, Nausea/Vomiting or Sleep) 10 tablet 3     ACYCLOVIR PO Take 400 mg by mouth 2 times daily       order for DME Wheelchair. 1 Units 0     lisinopril (PRINIVIL/ZESTRIL) 10 MG tablet Take 1 tablet (10 mg) by mouth daily 90 tablet 3     order for DME Please dispense one wheel chair 1 Units 0     order for DME Dispense one 4 wheeled walker with hand brakes and seat 1 Units 0     prochlorperazine (COMPAZINE) 10 MG tablet Take 1 tablet (10 mg) by mouth every 6 hours as needed (Nausea/Vomiting) 30 tablet 3     calcium carbonate-vitamin D 600-400 MG-UNIT CHEW Take 1 chew tab by mouth 2 times daily 180  "tablet 3     nitroglycerin (NITROSTAT) 0.4 MG sublingual tablet For chest pain place 1 tablet under the tongue every 5 minutes for 3 doses. If symptoms persist 5 minutes after 1st dose call 911. 25 tablet 0     aspirin 81 MG chewable tablet Take 81 mg by mouth daily        dexamethasone (DECADRON) 4 MG tablet Take 10 tablets (40 mg) by mouth every 7 days for 3 doses Days 1, 8, and 15. 30 tablet 0     LENalidomide (REVLIMID) 10 MG CAPS capsule CHEMO Take 1 capsule (10 mg) by mouth daily for 14 days Days 1 through 14. 14 capsule 0     No Known Allergies  FAMILY HISTORY NOTED AND REVIEWED  PHYSICAL EXAM:    /67 (BP Location: Right arm, Patient Position: Chair, Cuff Size: Adult Regular)  Pulse 80  Temp 99.1  F (37.3  C) (Oral)  Ht 5' 3\" (1.6 m)  Wt 168 lb 1.6 oz (76.2 kg)  SpO2 94%  BMI 29.78 kg/m2    Patient appears non toxic  Heart: RRR without m/r/g.  Lungs: CTA bilat without crackles  Extr: NO EDEMA, erythema or palp cords on exam.  Psych: approp affect and mood. Sharp, still works    Results for orders placed or performed in visit on 10/16/17   CBC with platelets differential   Result Value Ref Range    WBC 4.8 4.0 - 11.0 10e9/L    RBC Count 3.78 (L) 3.8 - 5.2 10e12/L    Hemoglobin 11.5 (L) 11.7 - 15.7 g/dL    Hematocrit 34.3 (L) 35.0 - 47.0 %    MCV 91 78 - 100 fl    MCH 30.4 26.5 - 33.0 pg    MCHC 33.5 31.5 - 36.5 g/dL    RDW 14.0 10.0 - 15.0 %    Platelet Count 341 150 - 450 10e9/L    Diff Method Automated Method     % Neutrophils 50.5 %    % Lymphocytes 27.1 %    % Monocytes 13.8 %    % Eosinophils 6.9 %    % Basophils 1.3 %    % Immature Granulocytes 0.4 %    Nucleated RBCs 0 0 /100    Absolute Neutrophil 2.4 1.6 - 8.3 10e9/L    Absolute Lymphocytes 1.3 0.8 - 5.3 10e9/L    Absolute Monocytes 0.7 0.0 - 1.3 10e9/L    Absolute Eosinophils 0.3 0.0 - 0.7 10e9/L    Absolute Basophils 0.1 0.0 - 0.2 10e9/L    Abs Immature Granulocytes 0.0 0 - 0.4 10e9/L    Absolute Nucleated RBC 0.0          Assessment and " Plan:     (R60.0) Bilateral lower extremity edema  (primary encounter diagnosis)  Comment: resolved today, but be a SE of her Revlimid; discuss with Dr. Parry  Plan: XR Chest 2 Views. No dVT on u/s. Doubt CHF.          (R06.02) SOB (shortness of breath)  Comment: work up done and reviewed.  This sob preceded multiple myeloma dx.  Recd hgb improved  Plan: chronic, may be due to afib.  She did see cards about this and note is reviewed in Epic.    (I48.91) Atrial fibrillation, unspecified type (H)  Comment: pt has pacer due to second degree heart block.  Plan: normal sinus rhythm today.  Not anticoagulated    (C90.00) Multiple myeloma not having achieved remission (H)  Comment:   Plan: improving on current regimen, per Dr. Sohan Chase PA-C

## 2017-10-18 DIAGNOSIS — C90.00 MULTIPLE MYELOMA NOT HAVING ACHIEVED REMISSION (H): Primary | ICD-10-CM

## 2017-10-18 RX ORDER — EPINEPHRINE 1 MG/ML
0.3 INJECTION, SOLUTION INTRAMUSCULAR; SUBCUTANEOUS EVERY 5 MIN PRN
Status: CANCELLED | OUTPATIENT
Start: 2017-10-30

## 2017-10-18 RX ORDER — SODIUM CHLORIDE 9 MG/ML
1000 INJECTION, SOLUTION INTRAVENOUS CONTINUOUS PRN
Status: CANCELLED
Start: 2017-11-06

## 2017-10-18 RX ORDER — METHYLPREDNISOLONE SODIUM SUCCINATE 125 MG/2ML
125 INJECTION, POWDER, LYOPHILIZED, FOR SOLUTION INTRAMUSCULAR; INTRAVENOUS
Status: CANCELLED
Start: 2017-10-23

## 2017-10-18 RX ORDER — EPINEPHRINE 0.3 MG/.3ML
0.3 INJECTION SUBCUTANEOUS EVERY 5 MIN PRN
Status: CANCELLED | OUTPATIENT
Start: 2017-10-23

## 2017-10-18 RX ORDER — METHYLPREDNISOLONE SODIUM SUCCINATE 125 MG/2ML
125 INJECTION, POWDER, LYOPHILIZED, FOR SOLUTION INTRAMUSCULAR; INTRAVENOUS
Status: CANCELLED
Start: 2017-10-30

## 2017-10-18 RX ORDER — ALBUTEROL SULFATE 0.83 MG/ML
2.5 SOLUTION RESPIRATORY (INHALATION)
Status: CANCELLED | OUTPATIENT
Start: 2017-11-06

## 2017-10-18 RX ORDER — ALBUTEROL SULFATE 90 UG/1
1-2 AEROSOL, METERED RESPIRATORY (INHALATION)
Status: CANCELLED
Start: 2017-10-30

## 2017-10-18 RX ORDER — EPINEPHRINE 0.3 MG/.3ML
0.3 INJECTION SUBCUTANEOUS EVERY 5 MIN PRN
Status: CANCELLED | OUTPATIENT
Start: 2017-11-06

## 2017-10-18 RX ORDER — LORAZEPAM 2 MG/ML
0.5 INJECTION INTRAMUSCULAR EVERY 4 HOURS PRN
Status: CANCELLED
Start: 2017-10-23

## 2017-10-18 RX ORDER — MEPERIDINE HYDROCHLORIDE 50 MG/ML
25 INJECTION INTRAMUSCULAR; INTRAVENOUS; SUBCUTANEOUS EVERY 30 MIN PRN
Status: CANCELLED | OUTPATIENT
Start: 2017-10-30

## 2017-10-18 RX ORDER — SODIUM CHLORIDE 9 MG/ML
1000 INJECTION, SOLUTION INTRAVENOUS CONTINUOUS PRN
Status: CANCELLED
Start: 2017-10-23

## 2017-10-18 RX ORDER — DIPHENHYDRAMINE HYDROCHLORIDE 50 MG/ML
50 INJECTION INTRAMUSCULAR; INTRAVENOUS
Status: CANCELLED
Start: 2017-11-06

## 2017-10-18 RX ORDER — LENALIDOMIDE 10 MG/1
10 CAPSULE ORAL DAILY
Qty: 14 CAPSULE | Refills: 0 | Status: SHIPPED | OUTPATIENT
Start: 2017-10-18 | End: 2018-12-26

## 2017-10-18 RX ORDER — DEXAMETHASONE 4 MG/1
40 TABLET ORAL
Qty: 30 TABLET | Refills: 0 | Status: SHIPPED | OUTPATIENT
Start: 2017-10-18 | End: 2017-11-06

## 2017-10-18 RX ORDER — METHYLPREDNISOLONE SODIUM SUCCINATE 125 MG/2ML
125 INJECTION, POWDER, LYOPHILIZED, FOR SOLUTION INTRAMUSCULAR; INTRAVENOUS
Status: CANCELLED
Start: 2017-11-06

## 2017-10-18 RX ORDER — EPINEPHRINE 0.3 MG/.3ML
0.3 INJECTION SUBCUTANEOUS EVERY 5 MIN PRN
Status: CANCELLED | OUTPATIENT
Start: 2017-10-30

## 2017-10-18 RX ORDER — ALBUTEROL SULFATE 0.83 MG/ML
2.5 SOLUTION RESPIRATORY (INHALATION)
Status: CANCELLED | OUTPATIENT
Start: 2017-10-30

## 2017-10-18 RX ORDER — MEPERIDINE HYDROCHLORIDE 50 MG/ML
25 INJECTION INTRAMUSCULAR; INTRAVENOUS; SUBCUTANEOUS EVERY 30 MIN PRN
Status: CANCELLED | OUTPATIENT
Start: 2017-11-06

## 2017-10-18 RX ORDER — EPINEPHRINE 1 MG/ML
0.3 INJECTION, SOLUTION INTRAMUSCULAR; SUBCUTANEOUS EVERY 5 MIN PRN
Status: CANCELLED | OUTPATIENT
Start: 2017-11-06

## 2017-10-18 RX ORDER — EPINEPHRINE 1 MG/ML
0.3 INJECTION, SOLUTION INTRAMUSCULAR; SUBCUTANEOUS EVERY 5 MIN PRN
Status: CANCELLED | OUTPATIENT
Start: 2017-10-23

## 2017-10-18 RX ORDER — DIPHENHYDRAMINE HYDROCHLORIDE 50 MG/ML
50 INJECTION INTRAMUSCULAR; INTRAVENOUS
Status: CANCELLED
Start: 2017-10-23

## 2017-10-18 RX ORDER — MEPERIDINE HYDROCHLORIDE 50 MG/ML
25 INJECTION INTRAMUSCULAR; INTRAVENOUS; SUBCUTANEOUS EVERY 30 MIN PRN
Status: CANCELLED | OUTPATIENT
Start: 2017-10-23

## 2017-10-18 RX ORDER — LORAZEPAM 2 MG/ML
0.5 INJECTION INTRAMUSCULAR EVERY 4 HOURS PRN
Status: CANCELLED
Start: 2017-10-30

## 2017-10-18 RX ORDER — ALBUTEROL SULFATE 90 UG/1
1-2 AEROSOL, METERED RESPIRATORY (INHALATION)
Status: CANCELLED
Start: 2017-11-06

## 2017-10-18 RX ORDER — ALBUTEROL SULFATE 90 UG/1
1-2 AEROSOL, METERED RESPIRATORY (INHALATION)
Status: CANCELLED
Start: 2017-10-23

## 2017-10-18 RX ORDER — SODIUM CHLORIDE 9 MG/ML
1000 INJECTION, SOLUTION INTRAVENOUS CONTINUOUS PRN
Status: CANCELLED
Start: 2017-10-30

## 2017-10-18 RX ORDER — DIPHENHYDRAMINE HYDROCHLORIDE 50 MG/ML
50 INJECTION INTRAMUSCULAR; INTRAVENOUS
Status: CANCELLED
Start: 2017-10-30

## 2017-10-18 RX ORDER — ALBUTEROL SULFATE 0.83 MG/ML
2.5 SOLUTION RESPIRATORY (INHALATION)
Status: CANCELLED | OUTPATIENT
Start: 2017-10-23

## 2017-10-18 RX ORDER — LORAZEPAM 2 MG/ML
0.5 INJECTION INTRAMUSCULAR EVERY 4 HOURS PRN
Status: CANCELLED
Start: 2017-11-06

## 2017-10-23 ENCOUNTER — HOSPITAL ENCOUNTER (OUTPATIENT)
Facility: CLINIC | Age: 82
Setting detail: SPECIMEN
Discharge: HOME | End: 2017-10-23
Attending: INTERNAL MEDICINE | Admitting: INTERNAL MEDICINE
Payer: MEDICARE

## 2017-10-23 ENCOUNTER — INFUSION THERAPY VISIT (OUTPATIENT)
Dept: INFUSION THERAPY | Facility: CLINIC | Age: 82
End: 2017-10-23
Attending: INTERNAL MEDICINE
Payer: MEDICARE

## 2017-10-23 VITALS
SYSTOLIC BLOOD PRESSURE: 137 MMHG | DIASTOLIC BLOOD PRESSURE: 74 MMHG | HEART RATE: 92 BPM | TEMPERATURE: 97.9 F | RESPIRATION RATE: 16 BRPM | WEIGHT: 169 LBS | BODY MASS INDEX: 29.94 KG/M2

## 2017-10-23 DIAGNOSIS — C90.00 MULTIPLE MYELOMA NOT HAVING ACHIEVED REMISSION (H): Primary | ICD-10-CM

## 2017-10-23 LAB
ALBUMIN SERPL-MCNC: 3.5 G/DL (ref 3.4–5)
ALP SERPL-CCNC: 108 U/L (ref 40–150)
ALT SERPL W P-5'-P-CCNC: 20 U/L (ref 0–50)
ANION GAP SERPL CALCULATED.3IONS-SCNC: 6 MMOL/L (ref 3–14)
AST SERPL W P-5'-P-CCNC: 13 U/L (ref 0–45)
BASOPHILS # BLD AUTO: 0.1 10E9/L (ref 0–0.2)
BASOPHILS NFR BLD AUTO: 3 %
BILIRUB SERPL-MCNC: 0.5 MG/DL (ref 0.2–1.3)
BUN SERPL-MCNC: 21 MG/DL (ref 7–30)
CALCIUM SERPL-MCNC: 9 MG/DL (ref 8.5–10.1)
CHLORIDE SERPL-SCNC: 106 MMOL/L (ref 94–109)
CO2 SERPL-SCNC: 27 MMOL/L (ref 20–32)
CREAT SERPL-MCNC: 0.65 MG/DL (ref 0.52–1.04)
DIFFERENTIAL METHOD BLD: ABNORMAL
ELLIPTOCYTES BLD QL SMEAR: ABNORMAL
EOSINOPHIL # BLD AUTO: 0 10E9/L (ref 0–0.7)
EOSINOPHIL NFR BLD AUTO: 0 %
ERYTHROCYTE [DISTWIDTH] IN BLOOD BY AUTOMATED COUNT: 13.8 % (ref 10–15)
GFR SERPL CREATININE-BSD FRML MDRD: 88 ML/MIN/1.7M2
GLUCOSE SERPL-MCNC: 74 MG/DL (ref 70–99)
HCT VFR BLD AUTO: 35.5 % (ref 35–47)
HGB BLD-MCNC: 11.9 G/DL (ref 11.7–15.7)
LYMPHOCYTES # BLD AUTO: 1.7 10E9/L (ref 0.8–5.3)
LYMPHOCYTES NFR BLD AUTO: 35 %
MCH RBC QN AUTO: 29.9 PG (ref 26.5–33)
MCHC RBC AUTO-ENTMCNC: 33.5 G/DL (ref 31.5–36.5)
MCV RBC AUTO: 89 FL (ref 78–100)
MONOCYTES # BLD AUTO: 0.8 10E9/L (ref 0–1.3)
MONOCYTES NFR BLD AUTO: 17 %
NEUTROPHILS # BLD AUTO: 2.2 10E9/L (ref 1.6–8.3)
NEUTROPHILS NFR BLD AUTO: 45 %
PLATELET # BLD AUTO: 146 10E9/L (ref 150–450)
PLATELET # BLD EST: ABNORMAL 10*3/UL
POTASSIUM SERPL-SCNC: 3.8 MMOL/L (ref 3.4–5.3)
PROT SERPL-MCNC: 6.8 G/DL (ref 6.8–8.8)
RBC # BLD AUTO: 3.98 10E12/L (ref 3.8–5.2)
SODIUM SERPL-SCNC: 139 MMOL/L (ref 133–144)
WBC # BLD AUTO: 4.9 10E9/L (ref 4–11)

## 2017-10-23 PROCEDURE — 96401 CHEMO ANTI-NEOPL SQ/IM: CPT

## 2017-10-23 PROCEDURE — 25000128 H RX IP 250 OP 636: Mod: JW | Performed by: INTERNAL MEDICINE

## 2017-10-23 PROCEDURE — 85025 COMPLETE CBC W/AUTO DIFF WBC: CPT | Performed by: INTERNAL MEDICINE

## 2017-10-23 PROCEDURE — 80053 COMPREHEN METABOLIC PANEL: CPT | Performed by: INTERNAL MEDICINE

## 2017-10-23 RX ADMIN — BORTEZOMIB 2.7 MG: 3.5 INJECTION, POWDER, LYOPHILIZED, FOR SOLUTION INTRAVENOUS; SUBCUTANEOUS at 12:47

## 2017-10-23 ASSESSMENT — PAIN SCALES - GENERAL: PAINLEVEL: NO PAIN (0)

## 2017-10-23 NOTE — PROGRESS NOTES
Infusion Nursing Note:  Quiana Dunaway presents today for Velcade C4D1.    Patient seen by provider today: No   present during visit today: Not Applicable.    Note: N/A.    Intravenous Access:  Lab draw site right lower forearm, Needle type Butterfly, Gauge 23.  Labs drawn without difficulty.    Treatment Conditions:  Lab Results   Component Value Date    HGB 11.9 10/23/2017     Lab Results   Component Value Date    WBC 4.9 10/23/2017      Lab Results   Component Value Date    ANEU 2.2 10/23/2017     Lab Results   Component Value Date     10/23/2017      Lab Results   Component Value Date     10/23/2017                   Lab Results   Component Value Date    POTASSIUM 3.8 10/23/2017           No results found for: MAG         Lab Results   Component Value Date    CR 0.65 10/23/2017                   Lab Results   Component Value Date    HUMBERTO 9.0 10/23/2017                Lab Results   Component Value Date    BILITOTAL 0.5 10/23/2017           Lab Results   Component Value Date    ALBUMIN 3.5 10/23/2017                    Lab Results   Component Value Date    ALT 20 10/23/2017           Lab Results   Component Value Date    AST 13 10/23/2017     Results reviewed, labs MET treatment parameters, ok to proceed with treatment.          Post Infusion Assessment:  Patient tolerated injection without incident.  Site patent and intact, free from redness, edema or discomfort.  No evidence of extravasations.    Discharge Plan:   Discharge instructions reviewed with: Patient and Family.  Patient and/or family verbalized understanding of discharge instructions and all questions answered.  Copy of AVS reviewed with patient and/or family.  Patient will return 10/30/2017 for next appointment.  Patient discharged in stable condition accompanied by: self and mariaelena.  Departure Mode: Ambulatory.    Ammy Thomas RN

## 2017-10-23 NOTE — MR AVS SNAPSHOT
After Visit Summary   10/23/2017    Quiana Dunaway    MRN: [id number]           Patient Information     Date Of Birth          1935        Visit Information        Provider Department      10/23/2017 10:30 AM SH INFUSION CHAIR 9 [facility]        Today's Diagnoses     Multiple myeloma not having achieved remission (H)    -  1       Follow-ups after your visit        Your next 10 appointments already scheduled     Oct 30, 2017  2:00 PM CDT   Level 2 with SH INFUSION CHAIR 7   [facility] ([facility])    [facility] [place] [id number]  [place] [address]   [phone]            Oct 30, 2017  4:30 PM CDT   Courtesy Device Check with RIOS DCR2   [facility] ([facility])    [address]  [place] [address]   [phone]            Nov 06, 2017  1:30 PM CST   Return Visit with Alex Parry MD   [facility] ([facility])    [facility]  [address]  [place] [address]   [phone]            Nov 06, 2017  2:00 PM CST   Level 2 with SH INFUSION CHAIR 19   [facility] ([facility])    [facility]  [address]  [place] [address]   [phone]            Nov 13, 2017  2:00 PM CST   Level 2 with SH INFUSION CHAIR 12   [facility] ([facility])    [facility]  [address]  [place] [address]   [phone]            Nov 20, 2017  2:00 PM CST   Level 2 with SH INFUSION CHAIR 2   [facility] ([facility])    [facility]  [address]  [place] [address]   [phone]            Nov 27, 2017  2:00 PM CST   Level 2 with SH  "INFUSION CHAIR 2   [facility] ([facility])    [facility] [place]  [address]  [place] [id number] [phone]            Dec 06, 2017  2:00 PM CST   Remote PPM Check with RIOS TECH1   [facility] [place] ([facility])    [address]  [place] [id number] [phone]           This appointment is for a remote check of your pacemaker.  This is not an appointment at the office.              Future tests that were ordered for you today     Open Standing Orders        Priority Remaining Interval Expires Ordered    Protein electrophoresis Routine 1/1 AM DRAW  10/23/2017    IgG Routine 1/1 AM DRAW  10/23/2017    Kappa and lambda light chain Routine 1/1 AM DRAW  10/23/2017            Who to contact     If you have questions or need follow up information about today's clinic visit or your schedule please contact [facility] directly at [phone].  Normal or non-critical lab and imaging results will be communicated to you by [employer], letter or phone within 4 business days after the clinic has received the results. If you do not hear from us within 7 days, please contact the clinic through [employer] or phone. If you have a critical or abnormal lab result, we will notify you by phone as soon as possible.  Submit refill requests through [employer] or call your pharmacy and they will forward the refill request to us. Please allow 3 business days for your refill to be completed.          Additional Information About Your Visit        [employer] Information     [employer] lets you send messages to your doctor, view your test results, renew your prescriptions, schedule appointments and more. To sign up, go to [email or web address] . Click on \"Log in\" on the left side of the screen, which will take you to the Welcome page. Then click on \"Sign up Now\" on the right side " of the page.     You will be asked to enter the access code listed below, as well as some personal information. Please follow the directions to create your username and password.     Your access code is: [email or web address]  Expires: 1/15/2018  2:13 PM     Your access code will  in 90 days. If you need help or a new code, please call your [facility] or [phone].        Care EveryWhere ID     This is your Care EveryWhere ID. This could be used by other organizations to access your [facility] medical records  [id number]        Your Vitals Were     Pulse Temperature Respirations BMI (Body Mass Index)          92 97.9  F (36.6  C) (Oral) 16 29.94 kg/m2         Blood Pressure from Last 3 Encounters:   10/23/17 137/74   10/17/17 132/67   10/16/17 132/57    Weight from Last 3 Encounters:   10/23/17 76.7 kg (169 lb)   10/17/17 76.2 kg (168 lb 1.6 oz)   10/16/17 75.8 kg (167 lb 3.2 oz)              We Performed the Following     CBC with platelets differential     Comprehensive metabolic panel        Primary Care Provider Office Phone # Fax #    César Chung [phone]       [facility] [address] [facility] [address]  [facility] [address]        Equal Access to Services     [facility] : Hadii aad ku hadasho Soomaali, waaxda luqadaha, qaybta kaalmada adeegyada, phoenix rodriguez . So [facility] [phone].    ATENCIÓN: Si habla español, tiene a contreras disposición servicios gratuitos de asistencia lingüística. Llame al [phone].    We comply with applicable federal civil rights laws and [place] laws. We do not discriminate on the basis of race, color, national origin, age, disability, sex, sexual orientation, or gender identity.            Thank you!     Thank you for choosing [facility]  for your care. Our goal is always to provide you with excellent care. Hearing back from our patients is one way we can continue to improve our services.  Please take a few minutes to complete the written survey that you may receive in the mail after your visit with us. Thank you!             Your Updated Medication List - Protect others around you: Learn how to safely use, store and throw away your medicines at www.disposemymeds.org.          This list is accurate as of: 10/23/17 12:57 PM.  Always use your most recent med list.                   Brand Name Dispense Instructions for use Diagnosis    ACYCLOVIR PO      Take 400 mg by mouth 2 times daily        aspirin 81 MG chewable tablet      Take 81 mg by mouth daily        calcium carbonate-vitamin D 600-400 MG-UNIT Chew     180 tablet    Take 1 chew tab by mouth 2 times daily    Multiple myeloma not having achieved remission (H)       dexamethasone 4 MG tablet    DECADRON    30 tablet    Take 10 tablets (40 mg) by mouth every 7 days for 3 doses Days 1, 8, and 15.    Multiple myeloma not having achieved remission (H)       LENalidomide 10 MG Caps capsule CHEMO    REVLIMID    14 capsule    Take 1 capsule (10 mg) by mouth daily for 14 days Days 1 through 14.    Multiple myeloma not having achieved remission (H)       lisinopril 10 MG tablet    PRINIVIL/ZESTRIL    90 tablet    Take 1 tablet (10 mg) by mouth daily    Benign essential hypertension       LORazepam 0.5 MG tablet    ATIVAN    10 tablet    Take 1 tablet (0.5 mg) by mouth every 8 hours as needed (Anxiety, Nausea/Vomiting or Sleep)    Multiple myeloma not having achieved remission (H)       nitroGLYcerin 0.4 MG sublingual tablet    NITROSTAT    25 tablet    For chest pain place 1 tablet under the tongue every 5 minutes for 3 doses. If symptoms persist 5 minutes after 1st dose call 911.    Atypical chest pain       * order for DME     1 Units    Please dispense one wheel chair    Multiple myeloma not having achieved remission (H)       * order for DME     1 Units    Dispense one 4 wheeled walker with hand brakes and seat    Multiple myeloma not having achieved  remission (H)       * order for DME     1 Units    Wheelchair.    Need for assistance due to unsteady gait       prochlorperazine 10 MG tablet    COMPAZINE    30 tablet    Take 1 tablet (10 mg) by mouth every 6 hours as needed (Nausea/Vomiting)    Multiple myeloma not having achieved remission (H)       * Notice:  This list has 3 medication(s) that are the same as other medications prescribed for you. Read the directions carefully, and ask your doctor or other care provider to review them with you.

## 2017-10-28 ENCOUNTER — HOSPITAL ENCOUNTER (EMERGENCY)
Facility: CLINIC | Age: 82
Discharge: HOME | End: 2017-10-29
Attending: EMERGENCY MEDICINE | Admitting: EMERGENCY MEDICINE
Payer: MEDICARE

## 2017-10-28 ENCOUNTER — APPOINTMENT (OUTPATIENT)
Dept: GENERAL RADIOLOGY | Facility: CLINIC | Age: 82
End: 2017-10-28
Attending: EMERGENCY MEDICINE
Payer: MEDICARE

## 2017-10-28 DIAGNOSIS — R07.9 ACUTE CHEST PAIN: ICD-10-CM

## 2017-10-28 LAB
ANION GAP SERPL CALCULATED.3IONS-SCNC: 6 MMOL/L (ref 3–14)
BUN SERPL-MCNC: 16 MG/DL (ref 7–30)
CALCIUM SERPL-MCNC: 8.5 MG/DL (ref 8.5–10.1)
CHLORIDE SERPL-SCNC: 107 MMOL/L (ref 94–109)
CO2 SERPL-SCNC: 29 MMOL/L (ref 20–32)
CREAT SERPL-MCNC: 0.65 MG/DL (ref 0.52–1.04)
DIFFERENTIAL METHOD BLD: NORMAL
ERYTHROCYTE [DISTWIDTH] IN BLOOD BY AUTOMATED COUNT: NORMAL % (ref 10–15)
GFR SERPL CREATININE-BSD FRML MDRD: 88 ML/MIN/1.7M2
GLUCOSE SERPL-MCNC: 89 MG/DL (ref 70–99)
HCT VFR BLD AUTO: NORMAL % (ref 35–47)
HGB BLD-MCNC: NORMAL G/DL (ref 11.7–15.7)
INTERPRETATION ECG - MUSE: NORMAL
MCH RBC QN AUTO: NORMAL PG (ref 26.5–33)
MCHC RBC AUTO-ENTMCNC: NORMAL G/DL (ref 31.5–36.5)
MCV RBC AUTO: NORMAL FL (ref 78–100)
PLATELET # BLD AUTO: NORMAL 10E9/L (ref 150–450)
POTASSIUM SERPL-SCNC: 3.8 MMOL/L (ref 3.4–5.3)
RBC # BLD AUTO: NORMAL 10E12/L (ref 3.8–5.2)
SODIUM SERPL-SCNC: 142 MMOL/L (ref 133–144)
TROPONIN I SERPL-MCNC: <0.015 UG/L (ref 0–0.04)
WBC # BLD AUTO: NORMAL 10E9/L (ref 4–11)

## 2017-10-28 PROCEDURE — 71020 XR CHEST 2 VW: CPT

## 2017-10-28 PROCEDURE — 85025 COMPLETE CBC W/AUTO DIFF WBC: CPT | Performed by: EMERGENCY MEDICINE

## 2017-10-28 PROCEDURE — 99285 EMERGENCY DEPT VISIT HI MDM: CPT | Mod: 25

## 2017-10-28 PROCEDURE — 84484 ASSAY OF TROPONIN QUANT: CPT | Performed by: EMERGENCY MEDICINE

## 2017-10-28 PROCEDURE — 93005 ELECTROCARDIOGRAM TRACING: CPT

## 2017-10-28 PROCEDURE — 80048 BASIC METABOLIC PNL TOTAL CA: CPT | Performed by: EMERGENCY MEDICINE

## 2017-10-28 NOTE — ED AVS SNAPSHOT
Emergency Department    [address] [place] [address]    Phone:  [phone]    Fax:  [phone]                                       Quiana Dunaway   MRN: [id number]    Department:   Emergency Department   Date of Visit:  10/28/2017           After Visit Summary Signature Page     I have received my discharge instructions, and my questions have been answered. I have discussed any challenges I see with this plan with the nurse or doctor.    ..........................................................................................................................................  Patient/Patient Representative Signature      ..........................................................................................................................................  Patient Representative Print Name and Relationship to Patient    ..................................................               ................................................  Date                                            Time    ..........................................................................................................................................  Reviewed by Signature/Title    ...................................................              ..............................................  Date                                                            Time

## 2017-10-28 NOTE — ED AVS SNAPSHOT
Emergency Department    [address] [facility] [address]    Phone:  [phone]    Fax:  [phone]                                       Quiana Dunaway   MRN: [id number]    Department:   Emergency Department   Date of Visit:  10/28/2017           Patient Information     Date Of Birth          1935        Your diagnoses for this visit were:     Acute chest pain        You were seen by Trierweiler, Chad A, MD.      Follow-up Information     Follow up with César Chung MD In 3 days.    Specialty:  Internal Medicine    Contact information:    [facility]  [address] [facility] [address]  [facility] [id number] [phone]          Follow up with  Emergency Department.    Specialty:  EMERGENCY MEDICINE    Why:  If symptoms worsen    Contact information:    [address] [facility] [address] [phone]        Discharge Instructions       Discharge Instructions  Chest Pain    You have been seen today for chest pain or discomfort.  At this time, your provider has found no signs that your chest pain is due to a serious or life-threatening condition, (or you have declined more testing and/or admission to the hospital). However, sometimes there is a serious problem that does not show up right away. Your evaluation today may not be complete and you may need further testing and evaluation.     Generally, every Emergency Department visit should have a follow-up clinic visit with either a primary or a specialty clinic/provider. Please follow-up as instructed by your emergency provider today.  Return to the Emergency Department if:    Your chest pain changes, gets worse, starts to happen more often, or comes with less activity.    You are newly short of breath.    You get very weak or tired.    You pass out or faint.    You have any new symptoms, like fever, cough, numb legs, or you cough up blood.    You have anything else that worries you.    Until you follow-up with your  regular provider, please do the following:    Take one aspirin daily unless you have an allergy or are told not to by your provider.    If a stress test appointment has been made, go to the appointment.    If you have questions, contact your regular provider.    Follow-up with your regular provider/clinic as directed; this is very important.    If you were given a prescription for medicine here today, be sure to read all of the information (including the package insert) that comes with your prescription.  This will include important information about the medicine, its side effects, and any warnings that you need to know about.  The pharmacist who fills the prescription can provide more information and answer questions you may have about the medicine.  If you have questions or concerns that the pharmacist cannot address, please call or return to the Emergency Department.       Remember that you can always come back to the Emergency Department if you are not able to see your regular provider in the amount of time listed above, if you get any new symptoms, or if there is anything that worries you.      Future Appointments        Provider Department Dept Phone Center    10/30/2017 2:00 PM [facility] Chair 1 [facility] [phone] [place]    10/30/2017 4:30 PM RIOS [facility] Heart Device RN [facility] [place] [phone] [facility] PSA CLIN    11/6/2017 1:30 PM Alex Parry MD [facility] [phone] [place]    11/6/2017 2:00 PM [facility] 1 [facility] [phone] [place]    11/13/2017 2:00 PM [place] chair 1 [facility] [phone] [place]    11/20/2017 2:00 PM Lab 2 [facility] [phone] [place]    11/27/2017 2:00 PM Lab 2 [facility] [phone] [place]    12/6/2017 2:00 PM RIOS P  Heart [facility] AT [place] [phone] [facility] PSA [facility]      24 Hour Appointment Hotline       To make an appointment at any [place] clinic, call [phone] ([phone]). If you don't have a family doctor or clinic, we will help you find one. [place] clinics are conveniently located to serve the needs of you and your family.             Review of your medicines      Our records show that you are taking the medicines listed below. If these are incorrect, please call your family doctor or clinic.        Dose / Directions Last dose taken    ACYCLOVIR PO   Dose:  400 mg        Take 400 mg by mouth 2 times daily   Refills:  0        aspirin 81 MG chewable tablet   Dose:  81 mg        Take 81 mg by mouth daily   Refills:  0        calcium carbonate-vitamin D 600-400 MG-UNIT Chew   Dose:  1 chew tab   Quantity:  180 tablet        Take 1 chew tab by mouth 2 times daily   Refills:  3        dexamethasone 4 MG tablet   Commonly known as:  DECADRON   Dose:  40 mg   Quantity:  30 tablet        Take 10 tablets (40 mg) by mouth every 7 days for 3 doses Days 1, 8, and 15.   Refills:  0        LENalidomide 10 MG Caps capsule CHEMO   Commonly known as:  REVLIMID   Dose:  10 mg   Quantity:  14 capsule        Take 1 capsule (10 mg) by mouth daily for 14 days Days 1 through 14.   Refills:  0        lisinopril 10 MG tablet   Commonly known as:  PRINIVIL/ZESTRIL   Dose:  10 mg   Quantity:  90 tablet        Take 1 tablet (10 mg) by mouth daily   Refills:  3        LORazepam 0.5 MG tablet   Commonly known as:  ATIVAN   Dose:  0.5 mg   Quantity:  10 tablet        Take 1 tablet (0.5 mg) by mouth every 8 hours as needed (Anxiety, Nausea/Vomiting or Sleep)   Refills:  3        nitroGLYcerin 0.4 MG sublingual tablet   Commonly known as:  NITROSTAT   Quantity:  25 tablet        For chest pain place 1 tablet under the tongue every 5 minutes for 3 doses. If symptoms persist 5 minutes after 1st dose call  911.   Refills:  0        * order for DME   Quantity:  1 Units        Please dispense one wheel chair   Refills:  0        * order for DME   Quantity:  1 Units        Dispense one 4 wheeled walker with hand brakes and seat   Refills:  0        * order for DME   Quantity:  1 Units        Wheelchair.   Refills:  0        prochlorperazine 10 MG tablet   Commonly known as:  COMPAZINE   Dose:  10 mg   Quantity:  30 tablet        Take 1 tablet (10 mg) by mouth every 6 hours as needed (Nausea/Vomiting)   Refills:  3        * Notice:  This list has 3 medication(s) that are the same as other medications prescribed for you. Read the directions carefully, and ask your doctor or other care provider to review them with you.            Procedures and tests performed during your visit     Procedure/Test Number of Times Performed    Basic metabolic panel 1    CBC with platelets differential 2    EKG 12-lead, tracing only 1    Troponin I 1    Troponin I (now) 1    XR Chest 2 Views 1      Orders Needing Specimen Collection     None      Pending Results     Date and Time Order Name Status Description    10/28/2017 7218 XR Chest 2 Views Preliminary             Pending Culture Results     No orders found for last 3 day(s).            Pending Results Instructions     If you had any lab results that were not finalized at the time of your Discharge, you can call the ED Lab Result RN at [phone]. You will be contacted by this team for any positive Lab results or changes in treatment. The nurses are available 7 days a week from 10A to 6:30P.  You can leave a message 24 hours per day and they will return your call.        Test Results From Your Hospital Stay        10/28/2017 11:39 PM      Component Results     Component Value Ref Range & Units Status    WBC Canceled, Test credited 4.0 - 11.0 10e9/L Final    Unsatisfactory specimen - clotted  NOTIFIED ER ON TRACKBOARD AT 2339 MA      RBC Count Canceled, Test credited 3.8 - 5.2 10e12/L  Final    Unsatisfactory specimen - clotted  NOTIFIED ER ON TRACKBOARD AT 2339 MA      Hemoglobin Canceled, Test credited 11.7 - 15.7 g/dL Final    Unsatisfactory specimen - clotted  NOTIFIED ER ON TRACKBOARD AT 2339 MA      Hematocrit Canceled, Test credited 35.0 - 47.0 % Final    Unsatisfactory specimen - clotted  NOTIFIED ER ON TRACKBOARD AT 2339 MA      MCV Canceled, Test credited 78 - 100 fl Final    Unsatisfactory specimen - clotted  NOTIFIED ER ON TRACKBOARD AT 2339 MA      MCH Canceled, Test credited 26.5 - 33.0 pg Final    Unsatisfactory specimen - clotted  NOTIFIED ER ON TRACKBOARD AT 2339 MA      MCHC Canceled, Test credited 31.5 - 36.5 g/dL Final    Unsatisfactory specimen - clotted  NOTIFIED ER ON TRACKBOARD AT 2339 MA      RDW Canceled, Test credited 10.0 - 15.0 % Final    Unsatisfactory specimen - clotted  NOTIFIED ER ON TRACKBOARD AT 2339 MA      Platelet Count Canceled, Test credited 150 - 450 10e9/L Final    Unsatisfactory specimen - clotted  NOTIFIED ER ON TRACKBOARD AT 2339 MA      Diff Method Canceled, Test credited  Final    Unsatisfactory specimen - clotted  NOTIFIED ER ON TRACKBOARD AT 2339 MA           10/28/2017 11:46 PM      Component Results     Component Value Ref Range & Units Status    Sodium 142 133 - 144 mmol/L Final    Potassium 3.8 3.4 - 5.3 mmol/L Final    Chloride 107 94 - 109 mmol/L Final    Carbon Dioxide 29 20 - 32 mmol/L Final    Anion Gap 6 3 - 14 mmol/L Final    Glucose 89 70 - 99 mg/dL Final    Urea Nitrogen 16 7 - 30 mg/dL Final    Creatinine 0.65 0.52 - 1.04 mg/dL Final    GFR Estimate 88 >60 mL/min/1.7m2 Final    Non  GFR Calc    GFR Estimate If Black >90 >60 mL/min/1.7m2 Final    African American GFR Calc    Calcium 8.5 8.5 - 10.1 mg/dL Final         10/28/2017 11:46 PM      Component Results     Component Value Ref Range & Units Status    Troponin I ES <0.015 0.000 - 0.045 ug/L Final    The 99th percentile for upper reference range is 0.045 ug/L.   Troponin values   in the range of 0.045 - 0.120 ug/L may be associated with risks of adverse   clinical events.           10/29/2017 12:17 AM      Narrative     XR CHEST 2 VIEWS  10/29/2017 12:09 AM      HISTORY: Chest pain.     COMPARISON: 10/17/2017.    FINDINGS: Left subclavian cardiac device in place. No pneumothorax.  The heart size is normal. Curvilinear scarring left lower lung. The  lungs are otherwise clear. No pleural effusion. Degenerative disease  in the spine.        Impression     IMPRESSION: No acute abnormality.         10/29/2017 12:02 AM      Component Results     Component Value Ref Range & Units Status    WBC 3.9 (L) 4.0 - 11.0 10e9/L Final    RBC Count 3.73 (L) 3.8 - 5.2 10e12/L Final    Hemoglobin 11.1 (L) 11.7 - 15.7 g/dL Final    Hematocrit 33.1 (L) 35.0 - 47.0 % Final    MCV 89 78 - 100 fl Final    MCH 29.8 26.5 - 33.0 pg Final    MCHC 33.5 31.5 - 36.5 g/dL Final    RDW 13.8 10.0 - 15.0 % Final    Platelet Count 80 (L) 150 - 450 10e9/L Final    Diff Method Automated Method  Final    % Neutrophils 52.2 % Final    % Lymphocytes 30.6 % Final    % Monocytes 8.6 % Final    % Eosinophils 8.1 % Final    % Basophils 0.0 % Final    % Immature Granulocytes 0.5 % Final    Nucleated RBCs 0 0 /100 Final    Absolute Neutrophil 2.0 1.6 - 8.3 10e9/L Final    Absolute Lymphocytes 1.2 0.8 - 5.3 10e9/L Final    Absolute Monocytes 0.3 0.0 - 1.3 10e9/L Final    Absolute Eosinophils 0.3 0.0 - 0.7 10e9/L Final    Absolute Basophils 0.0 0.0 - 0.2 10e9/L Final    Abs Immature Granulocytes 0.0 0 - 0.4 10e9/L Final    Absolute Nucleated RBC 0.0  Final         10/29/2017  1:02 AM      Component Results     Component Value Ref Range & Units Status    Troponin I ES <0.015 0.000 - 0.045 ug/L Final    The 99th percentile for upper reference range is 0.045 ug/L.  Troponin values   in the range of 0.045 - 0.120 ug/L may be associated with risks of adverse   clinical events.                  Clinical Quality Measure: Blood  Pressure Screening     Your blood pressure was checked while you were in the emergency department today. The last reading we obtained was  BP: 146/67 . Please read the guidelines below about what these numbers mean and what you should do about them.  If your systolic blood pressure (the top number) is less than 120 and your diastolic blood pressure (the bottom number) is less than 80, then your blood pressure is normal. There is nothing more that you need to do about it.  If your systolic blood pressure (the top number) is 120-139 or your diastolic blood pressure (the bottom number) is 80-89, your blood pressure may be higher than it should be. You should have your blood pressure rechecked within a year by a primary care provider.  If your systolic blood pressure (the top number) is 140 or greater or your diastolic blood pressure (the bottom number) is 90 or greater, you may have high blood pressure. High blood pressure is treatable, but if left untreated over time it can put you at risk for heart attack, stroke, or kidney failure. You should have your blood pressure rechecked by a primary care provider within the next 4 weeks.  If your provider in the emergency department today gave you specific instructions to follow-up with your doctor or provider even sooner than that, you should follow that instruction and not wait for up to 4 weeks for your follow-up visit.        Thank you for choosing [facility]       Thank you for choosing [facility] for your care. Our goal is always to provide you with excellent care. Hearing back from our patients is one way we can continue to improve our services. Please take a few minutes to complete the written survey that you may receive in the mail after you visit with us. Thank you!        [facility] Information     [employer] lets you send messages to your doctor, view your test results, renew your prescriptions, schedule appointments and more. To sign up, go to [email or web address] .  "Click on \"Log in\" on the left side of the screen, which will take you to the Welcome page. Then click on \"Sign up Now\" on the right side of the page.     You will be asked to enter the access code listed below, as well as some personal information. Please follow the directions to create your username and password.     Your access code is: [email or web address]  Expires: 1/15/2018  2:13 PM     Your access code will  in 90 days. If you need help or a new code, please call your [place] clinic or [phone].        Care EveryWhere ID     This is your Care EveryWhere ID. This could be used by other organizations to access your [place] medical records  [id number]        Equal Access to Services     LILLIANA SANDOVAL : Stacie Contreras, washweta escobar, nelda kaalkandice pineda, phoenix villeda. So [facility] [phone].    ATENCIÓN: Si habla español, tiene a contreras disposición servicios gratuitos de asistencia lingüística. Llame al [phone].    We comply with applicable federal civil rights laws and [place] laws. We do not discriminate on the basis of race, color, national origin, age, disability, sex, sexual orientation, or gender identity.            After Visit Summary       This is your record. Keep this with you and show to your community pharmacist(s) and doctor(s) at your next visit.                  "

## 2017-10-29 VITALS
TEMPERATURE: 99 F | SYSTOLIC BLOOD PRESSURE: 145 MMHG | HEIGHT: 62 IN | OXYGEN SATURATION: 95 % | RESPIRATION RATE: 27 BRPM | DIASTOLIC BLOOD PRESSURE: 67 MMHG

## 2017-10-29 LAB
BASOPHILS # BLD AUTO: 0 10E9/L (ref 0–0.2)
BASOPHILS NFR BLD AUTO: 0 %
DIFFERENTIAL METHOD BLD: ABNORMAL
EOSINOPHIL # BLD AUTO: 0.3 10E9/L (ref 0–0.7)
EOSINOPHIL NFR BLD AUTO: 8.1 %
ERYTHROCYTE [DISTWIDTH] IN BLOOD BY AUTOMATED COUNT: 13.8 % (ref 10–15)
HCT VFR BLD AUTO: 33.1 % (ref 35–47)
HGB BLD-MCNC: 11.1 G/DL (ref 11.7–15.7)
IMM GRANULOCYTES # BLD: 0 10E9/L (ref 0–0.4)
IMM GRANULOCYTES NFR BLD: 0.5 %
LYMPHOCYTES # BLD AUTO: 1.2 10E9/L (ref 0.8–5.3)
LYMPHOCYTES NFR BLD AUTO: 30.6 %
MCH RBC QN AUTO: 29.8 PG (ref 26.5–33)
MCHC RBC AUTO-ENTMCNC: 33.5 G/DL (ref 31.5–36.5)
MCV RBC AUTO: 89 FL (ref 78–100)
MONOCYTES # BLD AUTO: 0.3 10E9/L (ref 0–1.3)
MONOCYTES NFR BLD AUTO: 8.6 %
NEUTROPHILS # BLD AUTO: 2 10E9/L (ref 1.6–8.3)
NEUTROPHILS NFR BLD AUTO: 52.2 %
NRBC # BLD AUTO: 0 10*3/UL
NRBC BLD AUTO-RTO: 0 /100
PLATELET # BLD AUTO: 80 10E9/L (ref 150–450)
RBC # BLD AUTO: 3.73 10E12/L (ref 3.8–5.2)
TROPONIN I SERPL-MCNC: <0.015 UG/L (ref 0–0.04)
WBC # BLD AUTO: 3.9 10E9/L (ref 4–11)

## 2017-10-29 PROCEDURE — 84484 ASSAY OF TROPONIN QUANT: CPT | Performed by: EMERGENCY MEDICINE

## 2017-10-29 NOTE — ED PROVIDER NOTES
"  History     Chief Complaint:  Chest Pain    HPI   Quiana Dunaway is a 82 year old female who presents with complaints of having 2 hours of chest pressure radiating into her left shoulder, now ceased.  She notes she was simply sitting on her couch watching television when the discomfort came on.  She notes it felt like indigestion, but she did not feel improved after taking 2 Tums.  She denied having associated shortness of breath, lightheadedness, diaphoresis, or vomiting.  She eventually asked her daughter to bring her to the ER, though by the time they arrived her, her symptoms had passed.  She denies similar symptoms of chest pain in the past.  She otherwise denies other complaints at this juncture.  Of note, she is currently undergoing chemotherapy for multiple myeloma.      Cardiac Risk Factors   Sex: Female    Tobacco: Negative   Hypertension: Positive   Diabetes: Negative  Hyperlipidemia: Negative  Family History: Negative     Allergies:  No known drug allergies.      Medications:    Revlimid   Decadron   Ativan   Acyclovir   Lisinopril   Compazine   Nitroglycerin   Baby aspirin     Past Medical History:    Atrial fibrillation   Hypertension   Multiple myeloma   Pacemaker   Pancytopenia    Past Surgical History:    Bone marrow biopsy--7/12/2017     Family History:    History reviewed. No pertinent family history.     Social History:  Marital Status:    Presents to the ED with daughter  Tobacco Use: Never   Alcohol Use: No   PCP: César Chung     Review of Systems  Pertinent positives and negatives as above.  All other systems reviewed as negative.      Physical Exam   Patient Vitals for the past 24 hrs:   BP Temp Temp src Heart Rate Resp SpO2 Height   10/29/17 0014 146/67 - - 62 - 94 % -   10/28/17 2254 - - - - 27 94 % -   10/28/17 2251 146/66 - - - - 95 % -   10/28/17 2241 176/88 99  F (37.2  C) Oral 82 20 95 % 1.575 m (5' 2\")     Physical Exam  Eye:  Pupils are equal, round, and reactive.  " Extraocular movements intact.    ENT:  No rhinorrhea.  Moist mucus membranes.  Normal tongue and tonsil.    Cardiac:  Regular rate and rhythm.  No murmurs, gallops, or rubs.    Pulmonary:  Clear to auscultation bilaterally.  No wheezes, rales, or rhonchi.    Abdomen:  Positive bowel sounds.  Abdomen is soft and non-distended, without focal tenderness.    Musculoskeletal:  Normal movement of all extremities without evidence for deficit.  2+ edema bilaterally and symmetrically.    Skin:  Warm and dry without rashes.    Neurologic:  Non-focal exam without asymmetric weakness or numbness.     Psychiatric:  Normal affect with appropriate interaction with examiner.      Emergency Department Course   ECG:  @ 2243  Indication: Chest pain   Vent. Rate 74 bpm. MA interval 306 ms. QRS duration 160 ms. QT/QTc 460/510 ms. P-R-T axis 33 -26 53.   AV dual-paced rhythm with prolonged AV conduction. Abnormal ECG.  No significant change when compared to previous ECG from 06/06/2017   Read @ 2255 by Dr. Trierweiler.     Imaging:  XR Chest 2 Views  No acute abnormality.     Radiographic findings were communicated with the patient who voiced understanding of the findings.    Laboratory:  Blood:  BMP: WNL (Creatinine 0.65)  CBC:  WBC 3.9, HGB 11.1, PLT 80  Troponin I: <0.015   Delta troponin:  <0.015    Emergency Department Course:  Nursing notes and vitals reviewed.  I performed an exam of the patient as documented above.  EKG was done, interpretation as above.  A peripheral IV was established. Blood was drawn from the patient. This was sent for laboratory testing, findings above.   The patient was sent for a chest x-ray while in the emergency department, findings above.    (0025) I went to update the patient on the results of the labs and imaging. Discussed disposition options.   Findings and plan explained to the patient. Patient discharged home with instructions regarding supportive care, medications, and reasons to return. The  importance of close follow-up was reviewed.     Impression & Plan      Medical Decision Making:  This delightful elderly woman presents with concerns of having 2 hours of persistent chest pressure with some radiation to the left shoulder.  EKG was obtained which shows that her pacemaker is working appropriately.  Initial troponin was negative.  The rest of her lab work and chest x-ray were unremarkable.  I elected to obtain a delta troponin 2 hours later which was also undetectable.  The chart states that the patient had some left arm discomfort while her stay in the ED is progressing, though when I went to assess her, her blood pressure cuff to being was wrapped around her arm and was causing the discomfort, relieved once this was untangled.  Reviewing her chart, she underwent a nuclear stress test 6 months ago which was unremarkable.  I feel the likelihood of this representing acute coronary syndrome is exceedingly low and I feel she is safe for discharge home with reassurance and continued outpatient follow-up.  I also feel this is unlikely to represent pulmonary embolism and she never had associated shortness of breath and her symptoms resolved as quickly as they came.  A d-dimer or chest CT would not be indicated.  The exact cause of the pain is unclear, though GI causes are the most likely.  We discussed admission to the hospital for observation, though the patient defers that at this time which I feel is reasonable.  She was advised to return to the emergency department immediately if she develops any further chest pain, shortness of breath, tachycardia, or other emergent concerns.  Diagnosis:    ICD-10-CM    1. Acute chest pain R07.9 CANCELED: NM Lexiscan stress test       Disposition:  discharged to home      Uzair Rojas  10/28/2017    EMERGENCY DEPARTMENT    Scribe disclosure:   Uzair MCLEAN, farhad serving as a scribe on 10/29/2017 at 12:32 AM to personally document services performed by Dr. Jeffrey  Trierweiler based on my observations and the provider's statements to me.         Trierweiler, Chad A, MD  10/29/17 0552

## 2017-10-30 ENCOUNTER — HOSPITAL ENCOUNTER (OUTPATIENT)
Facility: CLINIC | Age: 82
Setting detail: SPECIMEN
Discharge: HOME | End: 2017-10-30
Attending: INTERNAL MEDICINE | Admitting: INTERNAL MEDICINE
Payer: MEDICARE

## 2017-10-30 ENCOUNTER — ALLIED HEALTH/NURSE VISIT (OUTPATIENT)
Dept: CARDIOLOGY | Facility: CLINIC | Age: 82
End: 2017-10-30
Payer: MEDICARE

## 2017-10-30 ENCOUNTER — INFUSION THERAPY VISIT (OUTPATIENT)
Dept: INFUSION THERAPY | Facility: CLINIC | Age: 82
End: 2017-10-30
Attending: INTERNAL MEDICINE
Payer: MEDICARE

## 2017-10-30 VITALS
RESPIRATION RATE: 18 BRPM | SYSTOLIC BLOOD PRESSURE: 126 MMHG | TEMPERATURE: 98.6 F | HEIGHT: 62 IN | HEART RATE: 69 BPM | WEIGHT: 167 LBS | DIASTOLIC BLOOD PRESSURE: 59 MMHG | BODY MASS INDEX: 30.73 KG/M2

## 2017-10-30 DIAGNOSIS — C90.00 MULTIPLE MYELOMA NOT HAVING ACHIEVED REMISSION (H): Primary | ICD-10-CM

## 2017-10-30 DIAGNOSIS — Z95.0 CARDIAC PACEMAKER IN SITU: Primary | ICD-10-CM

## 2017-10-30 LAB
BASOPHILS # BLD AUTO: 0 10E9/L (ref 0–0.2)
BASOPHILS NFR BLD AUTO: 0.2 %
DIFFERENTIAL METHOD BLD: ABNORMAL
EOSINOPHIL # BLD AUTO: 0.2 10E9/L (ref 0–0.7)
EOSINOPHIL NFR BLD AUTO: 4.8 %
ERYTHROCYTE [DISTWIDTH] IN BLOOD BY AUTOMATED COUNT: 13.8 % (ref 10–15)
HCT VFR BLD AUTO: 36.2 % (ref 35–47)
HGB BLD-MCNC: 12.1 G/DL (ref 11.7–15.7)
IMM GRANULOCYTES # BLD: 0 10E9/L (ref 0–0.4)
IMM GRANULOCYTES NFR BLD: 0.6 %
LYMPHOCYTES # BLD AUTO: 1.2 10E9/L (ref 0.8–5.3)
LYMPHOCYTES NFR BLD AUTO: 26.6 %
MCH RBC QN AUTO: 29.8 PG (ref 26.5–33)
MCHC RBC AUTO-ENTMCNC: 33.4 G/DL (ref 31.5–36.5)
MCV RBC AUTO: 89 FL (ref 78–100)
MONOCYTES # BLD AUTO: 0.3 10E9/L (ref 0–1.3)
MONOCYTES NFR BLD AUTO: 7.1 %
NEUTROPHILS # BLD AUTO: 2.8 10E9/L (ref 1.6–8.3)
NEUTROPHILS NFR BLD AUTO: 60.7 %
NRBC # BLD AUTO: 0 10*3/UL
NRBC BLD AUTO-RTO: 0 /100
PLATELET # BLD AUTO: 134 10E9/L (ref 150–450)
RBC # BLD AUTO: 4.06 10E12/L (ref 3.8–5.2)
WBC # BLD AUTO: 4.6 10E9/L (ref 4–11)

## 2017-10-30 PROCEDURE — 96401 CHEMO ANTI-NEOPL SQ/IM: CPT

## 2017-10-30 PROCEDURE — 25000128 H RX IP 250 OP 636: Performed by: INTERNAL MEDICINE

## 2017-10-30 PROCEDURE — 99207 ZZC NO CHARGE LOS: CPT

## 2017-10-30 PROCEDURE — 85025 COMPLETE CBC W/AUTO DIFF WBC: CPT | Performed by: INTERNAL MEDICINE

## 2017-10-30 RX ADMIN — BORTEZOMIB 2.7 MG: 3.5 INJECTION, POWDER, LYOPHILIZED, FOR SOLUTION INTRAVENOUS; SUBCUTANEOUS at 15:00

## 2017-10-30 NOTE — PATIENT INSTRUCTIONS
October 2017 Sunday Monday Tuesday Wednesday Thursday Friday Saturday   1     2     Outpatient Visit    8:58 AM   Alex Parry MD   [facility]     LEVEL 2    2:00 PM   (120 min.)    INFUSION CHAIR 17   [facility] 3     4     5     6     7       8     9     Outpatient Visit    8:14 AM   [facility]     LEVEL 2   11:30 AM   (120 min.)    INFUSION CHAIR 4   [facility]     Admission   12:43 PM   Brandyn Hernandez DO   [facility]   (Discharge: 10/10/2017)     US LWR EXT VENOUS DUPLEX LEFT    4:25 PM   (30 min.)   US1   [facility] 10     RETURN    3:00 PM   (30 min.)   Alex Parry MD   [facility] 11     12     13     14       15     16     Outpatient Visit    8:27 AM   Alex Parry MD   [facility]     LEVEL 2    1:00 PM   (120 min.)    INFUSION CHAIR 8   [facility] 17     LONG    2:00 PM   (30 min.)   Effie Chase PA-C   [facility]     XR CHEST 2 VIEWS    3:15 PM   (15 min.)   CSXR1   [facility] 18     19     20     21       22     23     Outpatient Visit    7:41 AM   Alex Parry MD   [facility]     LEVEL 2   10:30 AM   (120 min.)    INFUSION CHAIR 9   [facility] 24     25     26     27     28     Admission   10:46 PM    Emergency Department   (Discharge: 10/29/2017)     XR CHEST 2 VIEWS   11:25 PM   (15 min.)   [email or web address]   [facility]   29     30     LEVEL 2    2:00 PM   (120 min.)    INFUSION CHAIR 7   [facility]     COURTESY DEVICE CHECK    4:30 PM   (15 min.)   RIOS DCR2   [facility] AT [place] 31 November 2017 Sunday Monday Tuesday Wednesday Thursday Friday Saturday                  1     2     3     4        5     6     RETURN    1:30 PM   (30 min.)   Alex Parry MD   [facility]     LEVEL 2    2:00 PM   (120 min.)    INFUSION CHAIR 19   [facility] 7     8     9     10     11       12     13     LEVEL 2    2:00 PM   (120 min.)    INFUSION CHAIR 12   [facility] 14     15     16     17     18       19     20     LEVEL 2    2:00 PM   (120 min.)    INFUSION CHAIR 2   [facility] 21     22     23     24     25       26     27     LEVEL 2    2:00 PM   (120 min.)    INFUSION CHAIR 2   [facility] 28     29     30

## 2017-10-30 NOTE — PROGRESS NOTES
Infusion Nursing Note:  Quiana Dunaway presents today for C4D8 Velcade.    Patient seen by provider today: No   present during visit today: Not Applicable.    Note: N/A.    Intravenous Access:  Lab draw site left ac, Needle type BF, Gauge 23.  Labs drawn without difficulty.    Treatment Conditions:  Lab Results   Component Value Date    HGB 12.1 10/30/2017     Lab Results   Component Value Date    WBC 4.6 10/30/2017      Lab Results   Component Value Date    ANEU 2.8 10/30/2017     Lab Results   Component Value Date     10/30/2017      Results reviewed, labs MET treatment parameters, ok to proceed with treatment.        Post Infusion Assessment:  Patient tolerated injection without incident.  Site patent and intact, free from redness, edema or discomfort.  No evidence of extravasations.  Access discontinued per protocol.    Discharge Plan:   Discharge instructions reviewed with: Patient.  Patient and/or family verbalized understanding of discharge instructions and all questions answered.  Copy of AVS reviewed with patient and/or family.  Patient will return 11/6/17 for next appointment.  Patient discharged in stable condition accompanied by: daughter.  Departure Mode: Ambulatory.    Wes New RN

## 2017-10-30 NOTE — MR AVS SNAPSHOT
After Visit Summary   10/30/2017    Quiana Dunaway    MRN: [id number]           Patient Information     Date Of Birth          1935        Visit Information        Provider Department      10/30/2017 2:00 PM  INFUSION CHAIR 7 [facility]        Today's Diagnoses     Multiple myeloma not having achieved remission (H)    -  1      Care Instructions          October 2017 Sunday Monday Tuesday Wednesday Thursday Friday Saturday   1     2     Outpatient Visit    8:58 AM   [facility]     LEVEL 2    2:00 PM   (120 min.)    INFUSION CHAIR 17   [facility] 3     4     5     6     7       8     9     Outpatient Visit    8:14 AM   [facility] Lab     LEVEL 2   11:30 AM   (120 min.)    INFUSION CHAIR 4   [facility]     Admission   12:43 PM   Brandyn Hernandez DO   [facility] Observation Unit   (Discharge: 10/10/2017)     US LWR EXT VENOUS DUPLEX LEFT    4:25 PM   (30 min.)   US1   [facility] Ultrasound 10     RETURN    3:00 PM   (30 min.)   Alex Parry MD   [facility] 11     12     13     14       15     16     Outpatient Visit    8:27 AM   [facility]     LEVEL 2    1:00 PM   (120 min.)    INFUSION CHAIR 8   [facility] 17     LONG    2:00 PM   (30 min.)   Effie Chase PA-C   [facility] [place]     XR CHEST 2 VIEWS    3:15 PM   (15 min.)   CSXR1   [facility] 18     19     20     21       22     23     Outpatient Visit    7:41 AM   Alex Parry MD [place] [facility] Lab     LEVEL 2   10:30 AM   (120 min.)    INFUSION CHAIR 9   [facility] 24     25     26     27     28     Admission   10:46 PM    Emergency Department   (Discharge: 10/29/2017)     XR CHEST 2 VIEWS   11:25 PM   (15 min.)   XRER2    [facility] Radiology   29     30     LEVEL 2    2:00 PM   (120 min.)    INFUSION CHAIR 7   [facility]     COURTESY DEVICE CHECK    4:30 PM   (15 min.)   RIOS DCR2   [facility] PHYSICIANS HEART AT [place] 31 November 2017 Sunday Monday Tuesday Wednesday Thursday Friday Saturday                  1     2     3     4       5     6     RETURN    1:30 PM   (30 min.)   Alex Parry MD   [facility]     LEVEL 2    2:00 PM   (120 min.)    INFUSION CHAIR 19   [facility] 7     8     9     10     11       12     13     LEVEL 2    2:00 PM   (120 min.)    INFUSION CHAIR 12   [facility] 14     15     16     17     18       19     20     LEVEL 2    2:00 PM   (120 min.)    INFUSION CHAIR 2   [facility] 21     22     23     24     25       26     27     LEVEL 2    2:00 PM   (120 min.)    INFUSION CHAIR 2   [facility] 28     29     30                                  Follow-ups after your visit        Your next 10 appointments already scheduled     Oct 30, 2017  4:30 PM CDT   Courtesy Device Check with RIOS DCR2   [facility] PHYSICIANS HEART [facility] ([facility])    [address]  [facility] [address]   [phone]            Nov 06, 2017  1:30 PM CST   Return Visit with Alex Parry MD   [facility]    [facility]  [address]  [place] [address]   [phone]            Nov 06, 2017  2:00 PM CST   Level 2 with  INFUSION CHAIR 19   [facility] ([facility])    [facility]  [address]  [place] [address]   [phone]            Nov 13, 2017  2:00 PM CST   Level 2 with  INFUSION CHAIR  12   [facility] ([facility])    [facility] [place]  [address]  [place] [address]   [phone]            Nov 20, 2017  2:00 PM CST   Level 2 with SH INFUSION CHAIR 2   [facility] ([facility])    [facility] [place]  [address]  [place] [address] [phone]            Nov 27, 2017  2:00 PM CST   Level 2 with SH INFUSION CHAIR 2   [facility] ([facility])    [facility] [place]  [address]  [place] [address] [phone]            Dec 06, 2017  2:00 PM CST   Remote PPM Check with RIOS TECH1   [facility] PHYSICIANS HEART AT [place] ([facility])    [address]  [place] [address] [phone]           This appointment is for a remote check of your pacemaker.  This is not an appointment at the office.              Who to contact     If you have questions or need follow up information about today's clinic visit or your schedule please contact [facility] AND INFUSION [place] directly at [phone].  Normal or non-critical lab and imaging results will be communicated to you by "[employer]"hart, letter or phone within 4 business days after the clinic has received the results. If you do not hear from us within 7 days, please contact the clinic through "[employer]"hart or phone. If you have a critical or abnormal lab result, we will notify you by phone as soon as possible.  Submit refill requests through [employer] or call your pharmacy and they will forward the refill request to us. Please allow 3 business days for your refill to be completed.          Additional Information About Your Visit        [employer] Information     [employer] lets you send messages to your doctor, view your test results, renew your prescriptions, schedule appointments and more.  "To sign up, go to [email or web address] . Click on \"Log in\" on the left side of the screen, which will take you to the Welcome page. Then click on \"Sign up Now\" on the right side of the page.     You will be asked to enter the access code listed below, as well as some personal information. Please follow the directions to create your username and password.     Your access code is: [email or web address]  Expires: 1/15/2018  2:13 PM     Your access code will  in 90 days. If you need help or a new code, please call your [place] clinic or [phone].        Care EveryWhere ID     This is your Care EveryWhere ID. This could be used by other organizations to access your [place] medical records  [id number]        Your Vitals Were     Pulse Temperature Respirations Height BMI (Body Mass Index)       69 98.6  F (37  C) (Oral) 18 1.575 m (5' 2.01\") 30.54 kg/m2        Blood Pressure from Last 3 Encounters:   10/30/17 126/59   10/29/17 145/67   10/23/17 137/74    Weight from Last 3 Encounters:   10/30/17 75.8 kg (167 lb)   10/23/17 76.7 kg (169 lb)   10/17/17 76.2 kg (168 lb 1.6 oz)              We Performed the Following     CBC with platelets differential        Primary Care Provider Office Phone # Fax #    César JAQUEZ MD [place] [phone]       [facility] [address] [facility] [address]  [facility] [address]        Equal Access to Services     [facility]: Hadii aad ku hadasho Soomaali, waaxda luqadaha, qaybta kaalmada adeegyabertha, phoenix rodriguez . So [facility] [phone].    ATENCIÓN: Si habla español, tiene a contreras disposición servicios gratuitos de asistencia lingüística. Llame al [phone].    We comply with applicable federal civil rights laws and [place] laws. We do not discriminate on the basis of race, color, national origin, age, disability, sex, sexual orientation, or gender identity.            Thank you!     Thank you for choosing [facility] " CENTER  for your care. Our goal is always to provide you with excellent care. Hearing back from our patients is one way we can continue to improve our services. Please take a few minutes to complete the written survey that you may receive in the mail after your visit with us. Thank you!             Your Updated Medication List - Protect others around you: Learn how to safely use, store and throw away your medicines at www.disposemymeds.org.          This list is accurate as of: 10/30/17  2:36 PM.  Always use your most recent med list.                   Brand Name Dispense Instructions for use Diagnosis    ACYCLOVIR PO      Take 400 mg by mouth 2 times daily        aspirin 81 MG chewable tablet      Take 81 mg by mouth daily        calcium carbonate-vitamin D 600-400 MG-UNIT Chew     180 tablet    Take 1 chew tab by mouth 2 times daily    Multiple myeloma not having achieved remission (H)       dexamethasone 4 MG tablet    DECADRON    30 tablet    Take 10 tablets (40 mg) by mouth every 7 days for 3 doses Days 1, 8, and 15.    Multiple myeloma not having achieved remission (H)       LENalidomide 10 MG Caps capsule CHEMO    REVLIMID    14 capsule    Take 1 capsule (10 mg) by mouth daily for 14 days Days 1 through 14.    Multiple myeloma not having achieved remission (H)       lisinopril 10 MG tablet    PRINIVIL/ZESTRIL    90 tablet    Take 1 tablet (10 mg) by mouth daily    Benign essential hypertension       LORazepam 0.5 MG tablet    ATIVAN    10 tablet    Take 1 tablet (0.5 mg) by mouth every 8 hours as needed (Anxiety, Nausea/Vomiting or Sleep)    Multiple myeloma not having achieved remission (H)       nitroGLYcerin 0.4 MG sublingual tablet    NITROSTAT    25 tablet    For chest pain place 1 tablet under the tongue every 5 minutes for 3 doses. If symptoms persist 5 minutes after 1st dose call 911.    Atypical chest pain       * order for DME     1 Units    Please dispense one wheel chair    Multiple myeloma not  having achieved remission (H)       * order for DME     1 Units    Dispense one 4 wheeled walker with hand brakes and seat    Multiple myeloma not having achieved remission (H)       * order for DME     1 Units    Wheelchair.    Need for assistance due to unsteady gait       prochlorperazine 10 MG tablet    COMPAZINE    30 tablet    Take 1 tablet (10 mg) by mouth every 6 hours as needed (Nausea/Vomiting)    Multiple myeloma not having achieved remission (H)       * Notice:  This list has 3 medication(s) that are the same as other medications prescribed for you. Read the directions carefully, and ask your doctor or other care provider to review them with you.

## 2017-10-30 NOTE — MR AVS SNAPSHOT
After Visit Summary   10/30/2017    Quiana Dunaway    MRN: [id number]           Patient Information     Date Of Birth          1935        Visit Information        Provider Department      10/30/2017 4:30 PM GABRIEL PAUL [facility]   [place]        Today's Diagnoses     Cardiac pacemaker in situ    -  1       Follow-ups after your visit        Your next 10 appointments already scheduled     Nov 06, 2017  1:30 PM CST   Return Visit with Alex Parry MD   [facility]    [facility]  [address]  [place] [address]   [phone]            Nov 06, 2017  2:00 PM CST   Level 2 with SH INFUSION CHAIR 19   [facility] ([facility])    [facility] [place]  [address]  [place] [address]   [phone]            Nov 13, 2017  2:00 PM CST   Level 2 with SH INFUSION CHAIR 12   [facility] ([facility])    [facility] [place]  [address]  [place] [address]   [phone]            Nov 20, 2017  2:00 PM CST   Level 2 with SH INFUSION CHAIR 2   [facility] ([facility])    [facility] [place]  [address]  [place] [address]   [phone]            Nov 27, 2017  2:00 PM CST   Level 2 with SH INFUSION CHAIR 2   [facility] ([facility])    [facility] [place]  [address]  [place] [address]   [phone]            Dec 06, 2017  2:00 PM CST   Remote PPM Check with RIOS TECH1   [facility]   [place] ([facility])    [address]  [place] [address] [phone]           This appointment is for a remote check of your pacemaker.  This is not an  "appointment at the office.              Who to contact     If you have questions or need follow up information about today's clinic visit or your schedule please contact [facility] directly at [phone].  Normal or non-critical lab and imaging results will be communicated to you by MyChart, letter or phone within 4 business days after the clinic has received the results. If you do not hear from us within 7 days, please contact the clinic through MyChart or phone. If you have a critical or abnormal lab result, we will notify you by phone as soon as possible.  Submit refill requests through [employer] or call your pharmacy and they will forward the refill request to us. Please allow 3 business days for your refill to be completed.          Additional Information About Your Visit        [employer] Information     [employer] lets you send messages to your doctor, view your test results, renew your prescriptions, schedule appointments and more. To sign up, go to [email or web address] . Click on \"Log in\" on the left side of the screen, which will take you to the Welcome page. Then click on \"Sign up Now\" on the right side of the page.     You will be asked to enter the access code listed below, as well as some personal information. Please follow the directions to create your username and password.     Your access code is: [email or web address]  Expires: 1/15/2018  2:13 PM     Your access code will  in 90 days. If you need help or a new code, please call your [place] clinic or [phone].        Care EveryWhere ID     This is your Care EveryWhere ID. This could be used by other organizations to access your [place] medical records  [id number]         Blood Pressure from Last 3 Encounters:   10/30/17 126/59   10/29/17 145/67   10/23/17 137/74    Weight from Last 3 Encounters:   10/30/17 75.8 kg (167 lb)   10/23/17 76.7 kg (169 lb)   10/17/17 76.2 kg (168 lb 1.6 oz)              Today, you " had the following     No orders found for display       Primary Care Provider Office Phone # Fax #    César Chung [phone]       [place] [address] [facility] [address]  [facility] [address]        Equal Access to Services     PAMELLAKEVON LORI : Hadii aad ku hadasho Soomaali, waaxda luqadaha, qaybta kaalmada adeegyada, waxay idiin hayaan adeeg khysabelsh jennifer . So [facility] [phone].    ATENCIÓN: Si habla español, tiene a contreras disposición servicios gratuitos de asistencia lingüística. Llame al [phone].    We comply with applicable federal civil rights laws and [place] laws. We do not discriminate on the basis of race, color, national origin, age, disability, sex, sexual orientation, or gender identity.            Thank you!     Thank you for choosing [facility]  for your care. Our goal is always to provide you with excellent care. Hearing back from our patients is one way we can continue to improve our services. Please take a few minutes to complete the written survey that you may receive in the mail after your visit with us. Thank you!             Your Updated Medication List - Protect others around you: Learn how to safely use, store and throw away your medicines at www.disposemymeds.org.          This list is accurate as of: 10/30/17 11:59 PM.  Always use your most recent med list.                   Brand Name Dispense Instructions for use Diagnosis    ACYCLOVIR PO      Take 400 mg by mouth 2 times daily        aspirin 81 MG chewable tablet      Take 81 mg by mouth daily        calcium carbonate-vitamin D 600-400 MG-UNIT Chew     180 tablet    Take 1 chew tab by mouth 2 times daily    Multiple myeloma not having achieved remission (H)       dexamethasone 4 MG tablet    DECADRON    30 tablet    Take 10 tablets (40 mg) by mouth every 7 days for 3 doses Days 1, 8, and 15.    Multiple myeloma not having achieved remission (H)       LENalidomide 10 MG  Caps capsule CHEMO    REVLIMID    14 capsule    Take 1 capsule (10 mg) by mouth daily for 14 days Days 1 through 14.    Multiple myeloma not having achieved remission (H)       lisinopril 10 MG tablet    PRINIVIL/ZESTRIL    90 tablet    Take 1 tablet (10 mg) by mouth daily    Benign essential hypertension       LORazepam 0.5 MG tablet    ATIVAN    10 tablet    Take 1 tablet (0.5 mg) by mouth every 8 hours as needed (Anxiety, Nausea/Vomiting or Sleep)    Multiple myeloma not having achieved remission (H)       nitroGLYcerin 0.4 MG sublingual tablet    NITROSTAT    25 tablet    For chest pain place 1 tablet under the tongue every 5 minutes for 3 doses. If symptoms persist 5 minutes after 1st dose call 911.    Atypical chest pain       * order for DME     1 Units    Please dispense one wheel chair    Multiple myeloma not having achieved remission (H)       * order for DME     1 Units    Dispense one 4 wheeled walker with hand brakes and seat    Multiple myeloma not having achieved remission (H)       * order for DME     1 Units    Wheelchair.    Need for assistance due to unsteady gait       prochlorperazine 10 MG tablet    COMPAZINE    30 tablet    Take 1 tablet (10 mg) by mouth every 6 hours as needed (Nausea/Vomiting)    Multiple myeloma not having achieved remission (H)       * Notice:  This list has 3 medication(s) that are the same as other medications prescribed for you. Read the directions carefully, and ask your doctor or other care provider to review them with you.

## 2017-10-31 ENCOUNTER — TELEPHONE (OUTPATIENT)
Dept: CARDIOLOGY | Facility: CLINIC | Age: 82
End: 2017-10-31

## 2017-10-31 NOTE — PROGRESS NOTES
[employer] Courtesy Remote PPM Device Check/ check for AFib burden/ no charge  AP: 79 % : 13 %  Mode: DDDR        Presenting Rhythm: AP/, AP/VS, AS/, AS/VS  Heart Rate: Adequate rates per histogram  Sensing: Stable    Pacing Threshold: Stable    Impedance: Stable  Battery Status: 7.5-9.5 years  Atrial Arrhythmia: 2 brief mode switch episodes since last check on 8/30/17, no EGMs for review  Ventricular Arrhythmia: None     Care Plan: Will message Jacqueline Webb with findings. F/u PPM Carelink in December. E.Naveen,CVT

## 2017-10-31 NOTE — TELEPHONE ENCOUNTER
ANGIE Phillips: Your patient had only 2 brief mode switch episodes (8 seconds total) since last check on 8/30/17, no EGMs for review. Continues to take ASA only.     [employer] Courtesy Remote PPM Device Check/ check for AFib burden/ no charge  AP: 79 % : 13 %  Mode: DDDR        Presenting Rhythm: AP/, AP/VS, AS/, AS/VS  Heart Rate: Adequate rates per histogram  Sensing: Stable    Pacing Threshold: Stable    Impedance: Stable  Battery Status: 7.5-9.5 years  Atrial Arrhythmia: 2 brief mode switch episodes since last check on 8/30/17, no EGMs for review  Ventricular Arrhythmia: None     Care Plan: Will message Jacqueline Webb with findings. F/u PPM Carelink in December. LUIZA Bolanos

## 2017-11-06 ENCOUNTER — ONCOLOGY VISIT (OUTPATIENT)
Dept: ONCOLOGY | Facility: CLINIC | Age: 82
End: 2017-11-06
Attending: INTERNAL MEDICINE
Payer: MEDICARE

## 2017-11-06 ENCOUNTER — INFUSION THERAPY VISIT (OUTPATIENT)
Dept: INFUSION THERAPY | Facility: CLINIC | Age: 82
End: 2017-11-06
Attending: INTERNAL MEDICINE
Payer: MEDICARE

## 2017-11-06 ENCOUNTER — DOCUMENTATION ONLY (OUTPATIENT)
Dept: CARDIOLOGY | Facility: CLINIC | Age: 82
End: 2017-11-06

## 2017-11-06 ENCOUNTER — TELEPHONE (OUTPATIENT)
Dept: ONCOLOGY | Facility: CLINIC | Age: 82
End: 2017-11-06

## 2017-11-06 ENCOUNTER — DOCUMENTATION ONLY (OUTPATIENT)
Dept: PHARMACY | Facility: CLINIC | Age: 82
End: 2017-11-06

## 2017-11-06 ENCOUNTER — HOSPITAL ENCOUNTER (OUTPATIENT)
Facility: CLINIC | Age: 82
Setting detail: SPECIMEN
Discharge: HOME | End: 2017-11-06
Attending: INTERNAL MEDICINE | Admitting: INTERNAL MEDICINE
Payer: MEDICARE

## 2017-11-06 VITALS
TEMPERATURE: 98.4 F | SYSTOLIC BLOOD PRESSURE: 148 MMHG | WEIGHT: 170.42 LBS | DIASTOLIC BLOOD PRESSURE: 74 MMHG | BODY MASS INDEX: 31.36 KG/M2 | RESPIRATION RATE: 16 BRPM | HEART RATE: 61 BPM | HEIGHT: 62 IN

## 2017-11-06 VITALS
TEMPERATURE: 98.3 F | DIASTOLIC BLOOD PRESSURE: 81 MMHG | RESPIRATION RATE: 16 BRPM | BODY MASS INDEX: 31.16 KG/M2 | HEART RATE: 84 BPM | WEIGHT: 170.4 LBS | OXYGEN SATURATION: 95 % | SYSTOLIC BLOOD PRESSURE: 156 MMHG

## 2017-11-06 DIAGNOSIS — I48.91 ATRIAL FIBRILLATION (H): Primary | ICD-10-CM

## 2017-11-06 DIAGNOSIS — C90.00 MULTIPLE MYELOMA NOT HAVING ACHIEVED REMISSION (H): Primary | ICD-10-CM

## 2017-11-06 PROBLEM — E87.6 HYPOKALEMIA: Status: ACTIVE | Noted: 2017-11-06

## 2017-11-06 LAB
ALBUMIN SERPL-MCNC: 3.4 G/DL (ref 3.4–5)
ALP SERPL-CCNC: 104 U/L (ref 40–150)
ALT SERPL W P-5'-P-CCNC: 27 U/L (ref 0–50)
ANION GAP SERPL CALCULATED.3IONS-SCNC: 5 MMOL/L (ref 3–14)
AST SERPL W P-5'-P-CCNC: 18 U/L (ref 0–45)
BASOPHILS # BLD AUTO: 0 10E9/L (ref 0–0.2)
BASOPHILS NFR BLD AUTO: 0.2 %
BILIRUB SERPL-MCNC: 0.6 MG/DL (ref 0.2–1.3)
BUN SERPL-MCNC: 18 MG/DL (ref 7–30)
CALCIUM SERPL-MCNC: 8.2 MG/DL (ref 8.5–10.1)
CHLORIDE SERPL-SCNC: 105 MMOL/L (ref 94–109)
CO2 SERPL-SCNC: 30 MMOL/L (ref 20–32)
CREAT SERPL-MCNC: 0.71 MG/DL (ref 0.52–1.04)
DIFFERENTIAL METHOD BLD: ABNORMAL
EOSINOPHIL # BLD AUTO: 0.2 10E9/L (ref 0–0.7)
EOSINOPHIL NFR BLD AUTO: 4 %
ERYTHROCYTE [DISTWIDTH] IN BLOOD BY AUTOMATED COUNT: 14.1 % (ref 10–15)
GFR SERPL CREATININE-BSD FRML MDRD: 79 ML/MIN/1.7M2
GLUCOSE SERPL-MCNC: 74 MG/DL (ref 70–99)
HCT VFR BLD AUTO: 33.8 % (ref 35–47)
HGB BLD-MCNC: 11.2 G/DL (ref 11.7–15.7)
IMM GRANULOCYTES # BLD: 0 10E9/L (ref 0–0.4)
IMM GRANULOCYTES NFR BLD: 0.8 %
LYMPHOCYTES # BLD AUTO: 1.5 10E9/L (ref 0.8–5.3)
LYMPHOCYTES NFR BLD AUTO: 30.2 %
MCH RBC QN AUTO: 29.6 PG (ref 26.5–33)
MCHC RBC AUTO-ENTMCNC: 33.1 G/DL (ref 31.5–36.5)
MCV RBC AUTO: 89 FL (ref 78–100)
MONOCYTES # BLD AUTO: 0.7 10E9/L (ref 0–1.3)
MONOCYTES NFR BLD AUTO: 14.3 %
NEUTROPHILS # BLD AUTO: 2.5 10E9/L (ref 1.6–8.3)
NEUTROPHILS NFR BLD AUTO: 50.5 %
NRBC # BLD AUTO: 0 10*3/UL
NRBC BLD AUTO-RTO: 0 /100
PLATELET # BLD AUTO: 200 10E9/L (ref 150–450)
POTASSIUM SERPL-SCNC: 3.3 MMOL/L (ref 3.4–5.3)
PROT SERPL-MCNC: 6.4 G/DL (ref 6.8–8.8)
RBC # BLD AUTO: 3.79 10E12/L (ref 3.8–5.2)
SODIUM SERPL-SCNC: 140 MMOL/L (ref 133–144)
WBC # BLD AUTO: 5 10E9/L (ref 4–11)

## 2017-11-06 PROCEDURE — 99211 OFF/OP EST MAY X REQ PHY/QHP: CPT

## 2017-11-06 PROCEDURE — 25000128 H RX IP 250 OP 636: Performed by: INTERNAL MEDICINE

## 2017-11-06 PROCEDURE — A9270 NON-COVERED ITEM OR SERVICE: HCPCS | Mod: GY | Performed by: INTERNAL MEDICINE

## 2017-11-06 PROCEDURE — 99214 OFFICE O/P EST MOD 30 MIN: CPT | Performed by: INTERNAL MEDICINE

## 2017-11-06 PROCEDURE — 25000132 ZZH RX MED GY IP 250 OP 250 PS 637: Mod: GY | Performed by: INTERNAL MEDICINE

## 2017-11-06 PROCEDURE — 80053 COMPREHEN METABOLIC PANEL: CPT | Performed by: INTERNAL MEDICINE

## 2017-11-06 PROCEDURE — 36415 COLL VENOUS BLD VENIPUNCTURE: CPT

## 2017-11-06 PROCEDURE — 85025 COMPLETE CBC W/AUTO DIFF WBC: CPT | Performed by: INTERNAL MEDICINE

## 2017-11-06 PROCEDURE — 96374 THER/PROPH/DIAG INJ IV PUSH: CPT

## 2017-11-06 PROCEDURE — 96401 CHEMO ANTI-NEOPL SQ/IM: CPT

## 2017-11-06 RX ORDER — DIPHENHYDRAMINE HYDROCHLORIDE 50 MG/ML
50 INJECTION INTRAMUSCULAR; INTRAVENOUS
Status: CANCELLED
Start: 2017-11-13

## 2017-11-06 RX ORDER — METHYLPREDNISOLONE SODIUM SUCCINATE 125 MG/2ML
125 INJECTION, POWDER, LYOPHILIZED, FOR SOLUTION INTRAMUSCULAR; INTRAVENOUS
Status: CANCELLED
Start: 2017-11-20

## 2017-11-06 RX ORDER — EPINEPHRINE 1 MG/ML
0.3 INJECTION, SOLUTION INTRAMUSCULAR; SUBCUTANEOUS EVERY 5 MIN PRN
Status: CANCELLED | OUTPATIENT
Start: 2017-11-13

## 2017-11-06 RX ORDER — SODIUM CHLORIDE 9 MG/ML
1000 INJECTION, SOLUTION INTRAVENOUS CONTINUOUS PRN
Status: CANCELLED
Start: 2017-11-13

## 2017-11-06 RX ORDER — EPINEPHRINE 0.3 MG/.3ML
0.3 INJECTION SUBCUTANEOUS EVERY 5 MIN PRN
Status: CANCELLED | OUTPATIENT
Start: 2017-11-27

## 2017-11-06 RX ORDER — METHYLPREDNISOLONE SODIUM SUCCINATE 125 MG/2ML
125 INJECTION, POWDER, LYOPHILIZED, FOR SOLUTION INTRAMUSCULAR; INTRAVENOUS
Status: CANCELLED
Start: 2017-11-13

## 2017-11-06 RX ORDER — DEXAMETHASONE 4 MG/1
40 TABLET ORAL
Qty: 30 TABLET | Refills: 0 | Status: SHIPPED | OUTPATIENT
Start: 2017-11-06 | End: 2017-11-21

## 2017-11-06 RX ORDER — ALBUTEROL SULFATE 0.83 MG/ML
2.5 SOLUTION RESPIRATORY (INHALATION)
Status: CANCELLED | OUTPATIENT
Start: 2017-11-13

## 2017-11-06 RX ORDER — MEPERIDINE HYDROCHLORIDE 50 MG/ML
25 INJECTION INTRAMUSCULAR; INTRAVENOUS; SUBCUTANEOUS EVERY 30 MIN PRN
Status: CANCELLED | OUTPATIENT
Start: 2017-11-20

## 2017-11-06 RX ORDER — SODIUM CHLORIDE 9 MG/ML
1000 INJECTION, SOLUTION INTRAVENOUS CONTINUOUS PRN
Status: CANCELLED
Start: 2017-11-20

## 2017-11-06 RX ORDER — PROCHLORPERAZINE MALEATE 10 MG
10 TABLET ORAL EVERY 6 HOURS PRN
Qty: 30 TABLET | Refills: 3 | Status: SHIPPED | OUTPATIENT
Start: 2017-11-06 | End: 2018-07-09

## 2017-11-06 RX ORDER — ALBUTEROL SULFATE 0.83 MG/ML
2.5 SOLUTION RESPIRATORY (INHALATION)
Status: CANCELLED | OUTPATIENT
Start: 2017-11-27

## 2017-11-06 RX ORDER — LENALIDOMIDE 10 MG/1
10 CAPSULE ORAL DAILY
Qty: 14 CAPSULE | Refills: 0 | Status: SHIPPED | OUTPATIENT
Start: 2017-11-06 | End: 2018-12-26

## 2017-11-06 RX ORDER — MEPERIDINE HYDROCHLORIDE 50 MG/ML
25 INJECTION INTRAMUSCULAR; INTRAVENOUS; SUBCUTANEOUS EVERY 30 MIN PRN
Status: CANCELLED | OUTPATIENT
Start: 2017-11-13

## 2017-11-06 RX ORDER — ZOLEDRONIC ACID 0.04 MG/ML
4 INJECTION, SOLUTION INTRAVENOUS ONCE
Status: COMPLETED | OUTPATIENT
Start: 2017-11-06 | End: 2017-11-06

## 2017-11-06 RX ORDER — ALBUTEROL SULFATE 0.83 MG/ML
2.5 SOLUTION RESPIRATORY (INHALATION)
Status: CANCELLED | OUTPATIENT
Start: 2017-11-20

## 2017-11-06 RX ORDER — EPINEPHRINE 0.3 MG/.3ML
0.3 INJECTION SUBCUTANEOUS EVERY 5 MIN PRN
Status: CANCELLED | OUTPATIENT
Start: 2017-11-13

## 2017-11-06 RX ORDER — ALBUTEROL SULFATE 90 UG/1
1-2 AEROSOL, METERED RESPIRATORY (INHALATION)
Status: CANCELLED
Start: 2017-11-20

## 2017-11-06 RX ORDER — LORAZEPAM 2 MG/ML
0.5 INJECTION INTRAMUSCULAR EVERY 4 HOURS PRN
Status: CANCELLED
Start: 2017-11-20

## 2017-11-06 RX ORDER — LORAZEPAM 2 MG/ML
0.5 INJECTION INTRAMUSCULAR EVERY 4 HOURS PRN
Status: CANCELLED
Start: 2017-11-27

## 2017-11-06 RX ORDER — EPINEPHRINE 1 MG/ML
0.3 INJECTION, SOLUTION INTRAMUSCULAR; SUBCUTANEOUS EVERY 5 MIN PRN
Status: CANCELLED | OUTPATIENT
Start: 2017-11-20

## 2017-11-06 RX ORDER — METHYLPREDNISOLONE SODIUM SUCCINATE 125 MG/2ML
125 INJECTION, POWDER, LYOPHILIZED, FOR SOLUTION INTRAMUSCULAR; INTRAVENOUS
Status: CANCELLED
Start: 2017-11-27

## 2017-11-06 RX ORDER — EPINEPHRINE 1 MG/ML
0.3 INJECTION, SOLUTION INTRAMUSCULAR; SUBCUTANEOUS EVERY 5 MIN PRN
Status: CANCELLED | OUTPATIENT
Start: 2017-11-27

## 2017-11-06 RX ORDER — MEPERIDINE HYDROCHLORIDE 50 MG/ML
25 INJECTION INTRAMUSCULAR; INTRAVENOUS; SUBCUTANEOUS EVERY 30 MIN PRN
Status: CANCELLED | OUTPATIENT
Start: 2017-11-27

## 2017-11-06 RX ORDER — EPINEPHRINE 0.3 MG/.3ML
0.3 INJECTION SUBCUTANEOUS EVERY 5 MIN PRN
Status: CANCELLED | OUTPATIENT
Start: 2017-11-20

## 2017-11-06 RX ORDER — DIPHENHYDRAMINE HYDROCHLORIDE 50 MG/ML
50 INJECTION INTRAMUSCULAR; INTRAVENOUS
Status: CANCELLED
Start: 2017-11-27

## 2017-11-06 RX ORDER — ZOLEDRONIC ACID 0.04 MG/ML
4 INJECTION, SOLUTION INTRAVENOUS ONCE
Status: CANCELLED | OUTPATIENT
Start: 2017-11-15 | End: 2017-11-15

## 2017-11-06 RX ORDER — ALBUTEROL SULFATE 90 UG/1
1-2 AEROSOL, METERED RESPIRATORY (INHALATION)
Status: CANCELLED
Start: 2017-11-27

## 2017-11-06 RX ORDER — SODIUM CHLORIDE 9 MG/ML
1000 INJECTION, SOLUTION INTRAVENOUS CONTINUOUS PRN
Status: CANCELLED
Start: 2017-11-27

## 2017-11-06 RX ORDER — DEXAMETHASONE 4 MG/1
40 TABLET ORAL
Qty: 30 TABLET | Refills: 0 | Status: SHIPPED | OUTPATIENT
Start: 2017-11-06 | End: 2018-04-23

## 2017-11-06 RX ORDER — DIPHENHYDRAMINE HYDROCHLORIDE 50 MG/ML
50 INJECTION INTRAMUSCULAR; INTRAVENOUS
Status: CANCELLED
Start: 2017-11-20

## 2017-11-06 RX ORDER — POTASSIUM CHLORIDE 750 MG/1
40 TABLET, EXTENDED RELEASE ORAL ONCE
Status: COMPLETED | OUTPATIENT
Start: 2017-11-06 | End: 2017-11-06

## 2017-11-06 RX ORDER — ALBUTEROL SULFATE 90 UG/1
1-2 AEROSOL, METERED RESPIRATORY (INHALATION)
Status: CANCELLED
Start: 2017-11-13

## 2017-11-06 RX ORDER — LORAZEPAM 2 MG/ML
0.5 INJECTION INTRAMUSCULAR EVERY 4 HOURS PRN
Status: CANCELLED
Start: 2017-11-13

## 2017-11-06 RX ADMIN — BORTEZOMIB 2.7 MG: 3.5 INJECTION, POWDER, LYOPHILIZED, FOR SOLUTION INTRAVENOUS; SUBCUTANEOUS at 14:42

## 2017-11-06 RX ADMIN — ZOLEDRONIC ACID 4 MG: 0.04 INJECTION, SOLUTION INTRAVENOUS at 14:46

## 2017-11-06 RX ADMIN — SODIUM CHLORIDE 250 ML: 9 INJECTION, SOLUTION INTRAVENOUS at 14:47

## 2017-11-06 RX ADMIN — POTASSIUM CHLORIDE 40 MEQ: 750 TABLET, FILM COATED, EXTENDED RELEASE ORAL at 14:51

## 2017-11-06 ASSESSMENT — PAIN SCALES - GENERAL
PAINLEVEL: NO PAIN (0)
PAINLEVEL: NO PAIN (0)

## 2017-11-06 NOTE — PROGRESS NOTES
Oral Chemotherapy Monitoring Program.    Patient currently on Revlimid therapy.    Reviewed labs from 11/6/17    No concerning abnormalities.    Questions answered to patient's satisfaction. Spoke with Quiana for a few minutes today. She is still doing very well. She still has some fatigue but it upbeat about this and says that she feels better in general, so she is not too concerned about it. She has no other complaints at this time. She is due to restart her Revlimid next week. Verify labs on 11/13.    Debra Matos PharmD  November 6, 2017

## 2017-11-06 NOTE — PROGRESS NOTES
Per Jerald, order placed for 3/2018 f/u with Dr. Worthington.  Appt to be coordinated with device check if possible.  KMortimer RN

## 2017-11-06 NOTE — MR AVS SNAPSHOT
After Visit Summary   11/6/2017    Quiana Dunaway    MRN: [id number]           Patient Information     Date Of Birth          1935        Visit Information        Provider Department      11/6/2017 1:30 PM Alex Parry MD [facility]        Today's Diagnoses     Multiple myeloma not having achieved remission (H)    -  1      Care Instructions    Continue chemotherapy.  Scheduled - Tiera  Start zometa. Received next dose 12/4  Follow up in 1 month.  Scheduled - Tiera    AVS given to patient - Tiera          Follow-ups after your visit        Your next 10 appointments already scheduled     Nov 13, 2017  2:00 PM CST   Level 2 with SH INFUSION CHAIR 12   [facility] ([facility])    [facility]  [address]  [place] [address]   [phone]            Nov 20, 2017  2:00 PM CST   Level 2 with SH INFUSION CHAIR 2   [facility] ([facility])    [facility] [place] [id number]  [place] [address]   [phone]            Nov 27, 2017  2:00 PM CST   Level 2 with SH INFUSION CHAIR 2   [facility] ([facility])    [facility]  [address]  [place] [address]   [phone]            Dec 04, 2017  1:30 PM CST   Level 2 with SH INFUSION CHAIR 9   [facility] ([facility])    [facility]  [address]  [place] [address]   [phone]            Dec 04, 2017  2:00 PM CST   Return Visit with Alex Parry MD   [facility] ([facility])    [facility]  [address]  [place] [address]   [phone]            Dec 06, 2017  2:00 PM CST   Remote PPM Check with RIOS TECH1   [facility]   [place]  "([facility])    [id number]  [place] [id number] [phone]           This appointment is for a remote check of your pacemaker.  This is not an appointment at the office.              Who to contact     If you have questions or need follow up information about today's clinic visit or your schedule please contact [facility] directly at [phone].  Normal or non-critical lab and imaging results will be communicated to you by [employer], letter or phone within 4 business days after the clinic has received the results. If you do not hear from us within 7 days, please contact the clinic through [employer] or phone. If you have a critical or abnormal lab result, we will notify you by phone as soon as possible.  Submit refill requests through [employer] or call your pharmacy and they will forward the refill request to us. Please allow 3 business days for your refill to be completed.          Additional Information About Your Visit        [employer] Information     [employer] lets you send messages to your doctor, view your test results, renew your prescriptions, schedule appointments and more. To sign up, go to [email or web address] . Click on \"Log in\" on the left side of the screen, which will take you to the Welcome page. Then click on \"Sign up Now\" on the right side of the page.     You will be asked to enter the access code listed below, as well as some personal information. Please follow the directions to create your username and password.     Your access code is: [email or web address]  Expires: 1/15/2018  1:13 PM     Your access code will  in 90 days. If you need help or a new code, please call your [facility] clinic or [phone].        Care EveryWhere ID     This is your Care EveryWhere ID. This could be used by other organizations to access your [facility] medical records  [id number]        Your Vitals Were     Pulse Temperature Respirations Pulse Oximetry BMI (Body Mass Index)    "    84 98.3  F (36.8  C) (Oral) 16 95% 31.16 kg/m2        Blood Pressure from Last 3 Encounters:   11/06/17 148/74   11/06/17 156/81   10/30/17 126/59    Weight from Last 3 Encounters:   11/06/17 77.3 kg (170 lb 6.7 oz)   11/06/17 77.3 kg (170 lb 6.4 oz)   10/30/17 75.8 kg (167 lb)              We Performed the Following     CBC with platelets differential     Comprehensive metabolic panel          Today's Medication Changes          These changes are accurate as of: 11/6/17 11:59 PM.  If you have any questions, ask your nurse or doctor.               These medicines have changed or have updated prescriptions.        Dose/Directions    * dexamethasone 4 MG tablet   Commonly known as:  DECADRON   This may have changed:  Another medication with the same name was added. Make sure you understand how and when to take each.   Used for:  Multiple myeloma not having achieved remission (H)        Dose:  40 mg   Take 10 tablets (40 mg) by mouth every 7 days Days 1, 8, and 15.   Quantity:  30 tablet   Refills:  0       * dexamethasone 4 MG tablet   Commonly known as:  DECADRON   This may have changed:  You were already taking a medication with the same name, and this prescription was added. Make sure you understand how and when to take each.   Used for:  Multiple myeloma not having achieved remission (H)        Dose:  40 mg   Take 10 tablets (40 mg) by mouth every 7 days for 3 doses Days 1, 8, and 15.   Quantity:  30 tablet   Refills:  0       * LENalidomide 10 MG Caps capsule CHEMO   Commonly known as:  REVLIMID   This may have changed:  Another medication with the same name was added. Make sure you understand how and when to take each.   Used for:  Multiple myeloma not having achieved remission (H)   Changed by:  Alex Parry MD        Dose:  10 mg   Take 1 capsule (10 mg) by mouth daily for 14 days Days 1 through 14.   Quantity:  14 capsule   Refills:  0       * LENalidomide 10 MG Caps capsule CHEMO   Commonly known as:   REVLIMID   This may have changed:  You were already taking a medication with the same name, and this prescription was added. Make sure you understand how and when to take each.   Used for:  Multiple myeloma not having achieved remission (H)        Dose:  10 mg   Take 1 capsule (10 mg) by mouth daily for 14 days Days 1 through 14. Then off for 7 days.   Quantity:  14 capsule   Refills:  0       * Notice:  This list has 4 medication(s) that are the same as other medications prescribed for you. Read the directions carefully, and ask your doctor or other care provider to review them with you.         Where to get your medicines      These medications were sent to [facility] - [facility] [address]  [address] [facility] [address]    Hours:  Mon-Fri 8:30am-5:00pm Toll Free [phone] Phone:  [phone]     dexamethasone 4 MG tablet    LENalidomide 10 MG Caps capsule CHEMO    prochlorperazine 10 MG tablet         These medications were sent to [facility] [address]  [address], [facility] [address]     Phone:  [phone]     dexamethasone 4 MG tablet                Primary Care Provider Office Phone # Fax #    César Chung [phone]       [facility] [address]  [facility] [address]        Equal Access to Services     KEVON [facility] HospitalTHERESA : Hadii althea ku hadasho Soterrieali, waaxda luqadaha, qaybta kaalmada adedenzelyada, phoenix rodriguez . So [facility] [phone].    ATENCIÓN: Si habla español, tiene a contreras disposición servicios gratuitos de asistencia lingüística. Llame al [phone].    We comply with applicable federal civil rights laws and [place] laws. We do not discriminate on the basis of race, color, national origin, age, disability, sex, sexual orientation, or gender identity.            Thank you!     Thank you for choosing [facility]  for  your care. Our goal is always to provide you with excellent care. Hearing back from our patients is one way we can continue to improve our services. Please take a few minutes to complete the written survey that you may receive in the mail after your visit with us. Thank you!             Your Updated Medication List - Protect others around you: Learn how to safely use, store and throw away your medicines at www.disposemymeds.org.          This list is accurate as of: 11/6/17 11:59 PM.  Always use your most recent med list.                   Brand Name Dispense Instructions for use Diagnosis    ACYCLOVIR PO      Take 400 mg by mouth 2 times daily        aspirin 81 MG chewable tablet      Take 81 mg by mouth daily        calcium carbonate-vitamin D 600-400 MG-UNIT Chew     180 tablet    Take 1 chew tab by mouth 2 times daily    Multiple myeloma not having achieved remission (H)       * dexamethasone 4 MG tablet    DECADRON    30 tablet    Take 10 tablets (40 mg) by mouth every 7 days Days 1, 8, and 15.    Multiple myeloma not having achieved remission (H)       * dexamethasone 4 MG tablet    DECADRON    30 tablet    Take 10 tablets (40 mg) by mouth every 7 days for 3 doses Days 1, 8, and 15.    Multiple myeloma not having achieved remission (H)       * LENalidomide 10 MG Caps capsule CHEMO    REVLIMID    14 capsule    Take 1 capsule (10 mg) by mouth daily for 14 days Days 1 through 14.    Multiple myeloma not having achieved remission (H)       * LENalidomide 10 MG Caps capsule CHEMO    REVLIMID    14 capsule    Take 1 capsule (10 mg) by mouth daily for 14 days Days 1 through 14. Then off for 7 days.    Multiple myeloma not having achieved remission (H)       lisinopril 10 MG tablet    PRINIVIL/ZESTRIL    90 tablet    Take 1 tablet (10 mg) by mouth daily    Benign essential hypertension       LORazepam 0.5 MG tablet    ATIVAN    10 tablet    Take 1 tablet (0.5 mg) by mouth every 8 hours as needed (Anxiety,  Nausea/Vomiting or Sleep)    Multiple myeloma not having achieved remission (H)       nitroGLYcerin 0.4 MG sublingual tablet    NITROSTAT    25 tablet    For chest pain place 1 tablet under the tongue every 5 minutes for 3 doses. If symptoms persist 5 minutes after 1st dose call 911.    Atypical chest pain       * order for DME     1 Units    Dispense one 4 wheeled walker with hand brakes and seat    Multiple myeloma not having achieved remission (H)       * order for DME     1 Units    Wheelchair.    Need for assistance due to unsteady gait       prochlorperazine 10 MG tablet    COMPAZINE    30 tablet    Take 1 tablet (10 mg) by mouth every 6 hours as needed (Nausea/Vomiting)    Multiple myeloma not having achieved remission (H)       * Notice:  This list has 6 medication(s) that are the same as other medications prescribed for you. Read the directions carefully, and ask your doctor or other care provider to review them with you.

## 2017-11-06 NOTE — LETTER
"    11/6/2017         RE: Quiana Dunaway  [address] [address]        Dear Colleague,    Thank you for referring your patient, Quiana Dunaway, to the [facility]. Please see a copy of my visit note below.    Oncology Rooming Note    November 6, 2017 1:28 PM   Quiana Dunaway is a 82 year old female who presents for:    Chief Complaint   Patient presents with     Oncology Clinic Visit     Multiple myeloma not having achieved remission (H)     Initial Vitals: /81 (BP Location: Left arm, Patient Position: Sitting, Cuff Size: Adult Regular)  Pulse 84  Temp 98.3  F (36.8  C) (Oral)  Resp 16  Wt 77.3 kg (170 lb 6.4 oz)  SpO2 95%  BMI 31.16 kg/m2 Estimated body mass index is 31.16 kg/(m^2) as calculated from the following:    Height as of 10/30/17: 1.575 m (5' 2.01\").    Weight as of this encounter: 77.3 kg (170 lb 6.4 oz). Body surface area is 1.84 meters squared.  No Pain (0) Comment: Data Unavailable   No LMP recorded. Patient is postmenopausal.  Allergies reviewed: Yes  Medications reviewed: Yes    Medications: Medication refills not needed today.  Pharmacy name entered into [facility]:    [facility] [address] [place] [address] - [address] [facility] [address]  [facility] [place], [place] - [address]  [facility] - [place], [place] - [address]    Clinical concerns: None                     4 minutes for nursing intake (face to face time)     Isabel Sarmiento MA    Medical Assistant Note:  Quiana Dunaway presents today for Lab visit.    Patient seen by provider today: Yes: Dr. Parry.   present during visit today: Not Applicable.    Concerns: No Concerns.    Procedure:  Lab draw site: RAC, Needle type: BF, Gauge: 21 g gauze and coban applied.    Post Assessment:  Labs drawn without difficulty: Yes.    Discharge Plan:  Departure Mode: Ambulatory.    Face to Face Time: 4.    Isabel Sarmiento, " MA    DISCHARGE PLAN:  Next appointments: See patient instruction section.  Brought the patient to the [facility] for scheduling.  Departure Mode: Ambulatory  Accompanied by: self  4 minutes for nursing discharge (face to face time)   Isabel Sarmiento MA      HEMATOLOGY HISTORY: Mrs. Dunaway is a lady with multiple myeloma (IgG kappa). High risk, t(14;16).  1. She was evaluated for pancytopenia. On 05/01/2017:  -WBC of 3.4, hemoglobin of 10.2 and platelet of 154.    -CMP, Vitamin B12, folate, iron, ferritin, TSH aand haptoglobin are all normal.  2.  CT chest angiogram on 03/07/2017 did not reveal pulmonary embolism.  There is left thyroid nodule measuring 2.6 cm.  There is a 1.2 cm cystic appearing lesion in pancreatic tail.  It could be IPMN.  No splenomegaly.  3.  Colonoscopy on 04/28/2017 revealed colon polyps. Pathology revealed tubular adenoma.  No high-grade dysplasia.  4.  SPEP on 07/07/2017 reveals M-spike of 1.8.  5. Bone marrow biopsy on 07/12/2017 reveals hypercellular bone marrow with kappa monotypic plasma cell population consistent with multiple myeloma.  Bone marrow is 70% cellular.  Plasma cell is 50-60%.     -There is gain of chromosome 1, 5, 9, 15, and 17.  There is translocation 14;16.   6.  Multiple labs were done on 07/18/2017:  -M-spike of 1.9.   -Immunofixation reveals monoclonal IgG kappa.    -IgG level of 2970.  IgA of 15 and IgM of 175.    -Kappa free light chain of 67.7.  Lambda free light chain of 1.42.  Ratio of kappa to lambda of 47.71.    -Beta 2 microglobulin of 3.4.   7. PET scan on 07/24/2017 reveals several focal areas of increased FDG uptake consistent with multiple myeloma.  There is probable epithelial cyst in tail of the pancreas.  No associated abnormal FDG activity.  Left thyroid cysts or nodules without any FDG activity.  There is a 0.3 cm left upper lobe lung nodule.   8.  Velcade, Revlimid and dexamethasone started on 08/14/2017.  9. On 10/02/2017 (after 2 cycles):  -M-spike  of 0.5  -Kappa free light chain of 4.3  -IgG of 717.    SUBJECTIVE:    Ms. Quiana Dunaway is an 82-year-old female with high risk IgG kappa multiple myeloma.  She is on Velcade, Revlimid and dexamethasone since 08/2017.  Her disease is responding.      Patient overall is doing good.  Main side effect is fatigue.  Patient says that she has been more tired.  She is able to take care of herself and do normal day to day activity.        REVIEW OF SYSTEMS:    She gets intermittent headache.  Tylenol helps.  No dizziness.  No neck pain.  No chest pain. No difficulty breathing.  No abdominal pain.  She gets some nausea. Compazine helps.  No vomiting.  No urinary or bowel complaints.  No incontinence.  No fever or chills.      Exam:  Alert and oriented × 3.  Eyes: No icterus.    Throat: No ulcer.    Neck: No lymphadenopathy.  Nontender.  Axilla: No lymphadenopathy.  Lungs: Good air entry bilaterally.  No crackles or wheezing.  Heart: Regular.  Abdomen: Soft.  Nondistended.  Nontender.  No masses felt.  Extremities: No edema.  Skin: No petechiae.      LABORATORY DATA:  Reviewed.      ASSESSMENT:   1.  An 82-year-old female with IgG kappa multiple myeloma.  She is on Velcade, Revlimid and dexamethasone.  Diseases is responding.   2.  Fatigue.   3.  Mild anemia which is stable.     4.  Nausea secondary to chemotherapy.      PLAN:   1.  Patient overall is stable from myeloma. Discussed regarding treatment.  She is on Velcade, Revlimid and dexamethasone.  She is responding to it.  She will continue on it.  Side effects reviewed.   2.  Discussed with her regarding fatigue.  I told the patient that fatigue is due to multiple factors including her age, myeloma and chemotherapy.  It can get worse.  If fatigue becomes a significant problem, we can discuss with her regarding discontinuing Revlimid to see if it helps.  Further discussion when fatigue worsens.   She has intermittent numbness. Explained to the patient that she can have  worsening neuropathy from Velcade.    If neuropathy becomes a problem, Velcade will be reduced and/or discontinued.   3.  Patient will start Zometa.  Patient had dental extractions 6 weeks ago.  As per the patient, dentist said that she is okay to start Zometa.  Zometa will be started today.     4.  She had few questions, which were all answered.  Advised her to see a physician if she has fever, chills, recurrent vomiting, bleeding or any other concerns.  I will see her in about a month for followup.         YVES EPREZ MD             D: 2017 14:26   T: 2017 23:45   MT: TD      Name:     DUGLAS CASTILLO   MRN:      [id number]        Account:      [id number]   :      1935           Visit Date:   2017      Document: [id number]        Again, thank you for allowing me to participate in the care of your patient.        Sincerely,        Yves Perez MD

## 2017-11-06 NOTE — PROGRESS NOTES
"Oncology Rooming Note    November 6, 2017 1:28 PM   Quiana Dunaway is a 82 year old female who presents for:    Chief Complaint   Patient presents with     Oncology Clinic Visit     Multiple myeloma not having achieved remission (H)     Initial Vitals: /81 (BP Location: Left arm, Patient Position: Sitting, Cuff Size: Adult Regular)  Pulse 84  Temp 98.3  F (36.8  C) (Oral)  Resp 16  Wt 77.3 kg (170 lb 6.4 oz)  SpO2 95%  BMI 31.16 kg/m2 Estimated body mass index is 31.16 kg/(m^2) as calculated from the following:    Height as of 10/30/17: 1.575 m (5' 2.01\").    Weight as of this encounter: 77.3 kg (170 lb 6.4 oz). Body surface area is 1.84 meters squared.  No Pain (0) Comment: Data Unavailable   No LMP recorded. Patient is postmenopausal.  Allergies reviewed: Yes  Medications reviewed: Yes    Medications: Medication refills not needed today.  Pharmacy name entered into [employer]:    [facility] [address] [place] [address] - [address] AT [facility] [address]  [facility] [place], [place] - [address]  [facility] - [place], [place] - [address]    Clinical concerns: None                     4 minutes for nursing intake (face to face time)     Isabel Sarmiento MA    Medical Assistant Note:  Quiana Dunaway presents today for Lab visit.    Patient seen by provider today: Yes: Dr. Parry.   present during visit today: Not Applicable.    Concerns: No Concerns.    Procedure:  Lab draw site: RAC, Needle type: BF, Gauge: 21 g gauze and coban applied.    Post Assessment:  Labs drawn without difficulty: Yes.    Discharge Plan:  Departure Mode: Ambulatory.    Face to Face Time: 4.    Isabel Sarmiento MA    DISCHARGE PLAN:  Next appointments: See patient instruction section.  Brought the patient to the [facility] for scheduling.  Departure Mode: Ambulatory  Accompanied by: self  4 minutes for nursing discharge (face to face time)   Isabel Sarmiento MA    "

## 2017-11-06 NOTE — TELEPHONE ENCOUNTER
Called patient to clarify if she would be hand carrying in a dental clearance to receive her Zometa at her infusion appointment and was unable to a VM. Alecia Briggs

## 2017-11-06 NOTE — PATIENT INSTRUCTIONS
Continue chemotherapy.  Scheduled - Tiera  Start zometa. Received next dose 12/4  Follow up in 1 month.  Scheduled - Tiera    AVS given to patient - Tiera

## 2017-11-06 NOTE — PROGRESS NOTES
Infusion Nursing Note:  Quiana Dunaway presents today for Velcade D15,C4 and Zometa.    Patient seen by provider today: Yes:    present during visit today: Not Applicable.    Note: Seen in clinic today and ok to do Zometa today. Dental clearance faxed to dentist but no need to wait for form to be returned today per .    Intravenous Access:  Labs drawn without difficulty in clinic.  Peripheral IV placed.    Treatment Conditions:  Lab Results   Component Value Date    HGB 11.2 11/06/2017     Lab Results   Component Value Date    WBC 5.0 11/06/2017      Lab Results   Component Value Date    ANEU 2.5 11/06/2017     Lab Results   Component Value Date     11/06/2017      Lab Results   Component Value Date     11/06/2017                   Lab Results   Component Value Date    POTASSIUM 3.3 11/06/2017           No results found for: MAG         Lab Results   Component Value Date    CR 0.71 11/06/2017                   Lab Results   Component Value Date    HUMBERTO 8.2 11/06/2017                Lab Results   Component Value Date    BILITOTAL 0.6 11/06/2017           Lab Results   Component Value Date    ALBUMIN 3.4 11/06/2017                    Lab Results   Component Value Date    ALT 27 11/06/2017           Lab Results   Component Value Date    AST 18 11/06/2017     Results reviewed, labs MET treatment parameters, ok to proceed with treatment. Zometa and Velcade today.          Post Infusion Assessment:  Patient tolerated infusion without incident.  Patient tolerated injection without incident.  Blood return noted pre and post infusion.  Site patent and intact, free from redness, edema or discomfort.  No evidence of extravasations.  Access discontinued per protocol.  Oral Potassium given today for K+3.3. .    Discharge Plan:   Prescription refills given for Revlimid, Ativan,Decadron.  Discharge instructions reviewed with: Patient.  Patient and/or family verbalized understanding of  discharge instructions and all questions answered.  Copy of AVS reviewed with patient and/or family.  Patient will return next week 11/13 for next appointment.  Patient discharged in stable condition accompanied by: daughter in law.  Departure Mode: Ambulatory.    Cortney Cruz RN

## 2017-11-06 NOTE — MR AVS SNAPSHOT
After Visit Summary   11/6/2017    Quiana Dunaway    MRN: [id number]           Patient Information     Date Of Birth          1935        Visit Information        Provider Department      11/6/2017 2:00 PM SH INFUSION CHAIR 19 [facility]         Follow-ups after your visit        Your next 10 appointments already scheduled     Nov 06, 2017  2:00 PM CST   Level 2 with SH INFUSION CHAIR 19   [facility] ([facility])    [facility] [place]  [address]  [place] [address]   [phone]            Nov 13, 2017  2:00 PM CST   Level 2 with SH INFUSION CHAIR 12   [facility] ([facility])    [facility] [place]  [address]  [place] [address]   [phone]            Nov 20, 2017  2:00 PM CST   Level 2 with SH INFUSION CHAIR 2   [facility] ([facility]    [facility] [place]  [address]  [place] [address]   [phone]            Nov 27, 2017  2:00 PM CST   Level 2 with SH INFUSION CHAIR 2   [facility] ([facility]    [facility] [place]  [address]  [place] [address]   [phone]            Dec 04, 2017  1:30 PM CST   Level 2 with SH INFUSION CHAIR 9   [facility] ([facility]    [facility] [place]  [address]  [place] [address]   [phone]            Dec 04, 2017  2:00 PM CST   Return Visit with Alex Parry MD   [facility]    [facility] [place]  [address]  [place] [address]   [phone]            Dec 06, 2017  2:00 PM CST   Remote PPM Check with RIOS TECH1   [facility]   [place]  "([facility])    [id number]  [place] [id number] [phone]           This appointment is for a remote check of your pacemaker.  This is not an appointment at the office.              Future tests that were ordered for you today     Open Future Orders        Priority Expected Expires Ordered    Follow-Up with Electrophysiologist Routine 3/22/2018 2018 2017            Who to contact     If you have questions or need follow up information about today's clinic visit or your schedule please contact [facility] directly at [phone].  Normal or non-critical lab and imaging results will be communicated to you by [email or web address], letter or phone within 4 business days after the clinic has received the results. If you do not hear from us within 7 days, please contact the clinic through [email or web address] or phone. If you have a critical or abnormal lab result, we will notify you by phone as soon as possible.  Submit refill requests through [employer] or call your pharmacy and they will forward the refill request to us. Please allow 3 business days for your refill to be completed.          Additional Information About Your Visit        [email or web address] Information     [employer] lets you send messages to your doctor, view your test results, renew your prescriptions, schedule appointments and more. To sign up, go to [email or web address] . Click on \"Log in\" on the left side of the screen, which will take you to the Welcome page. Then click on \"Sign up Now\" on the right side of the page.     You will be asked to enter the access code listed below, as well as some personal information. Please follow the directions to create your username and password.     Your access code is: [email or web address]  Expires: 1/15/2018  1:13 PM     Your access code will  in 90 days. If you need help or a new code, please call your [place] clinic or [phone].        Care EveryWhere ID     This is " your Care EveryWhere ID. This could be used by other organizations to access your [place] medical records  [id number]         Blood Pressure from Last 3 Encounters:   11/06/17 156/81   10/30/17 126/59   10/29/17 145/67    Weight from Last 3 Encounters:   11/06/17 77.3 kg (170 lb 6.4 oz)   10/30/17 75.8 kg (167 lb)   10/23/17 76.7 kg (169 lb)              Today, you had the following     No orders found for display         Where to get your medicines      These medications were sent to [facility] - [place], [place] - [address]   [address] , [facility] [address]    Hours:  Mon-Fri 8:30am-5:00pm Toll Free [phone] Phone:  [phone]     prochlorperazine 10 MG tablet          Primary Care Provider Office Phone # Fax #    César GISELE Chung [phone]       [facility] [address] [facility] [address] [address]        Equal Access to Services     LILLIANA SANDOVAL : Hadii aad ku hadasho Soomaali, waaxda luqadaha, qaybta kaalmada adedenzelyada, pho[place] rodriguez . So [facility] [phone].    ATENCIÓN: Si habla español, tiene a contreras disposición servicios gratuitos de asistencia lingüística. [facility] [phone].    We comply with applicable federal civil rights laws and [place] laws. We do not discriminate on the basis of race, color, national origin, age, disability, sex, sexual orientation, or gender identity.            Thank you!     Thank you for choosing [facility]  for your care. Our goal is always to provide you with excellent care. Hearing back from our patients is one way we can continue to improve our services. Please take a few minutes to complete the written survey that you may receive in the mail after your visit with us. Thank you!             Your Updated Medication List - Protect others around you: Learn how to safely use, store and throw away your medicines at  www.disposemymeds.org.          This list is accurate as of: 11/6/17  1:57 PM.  Always use your most recent med list.                   Brand Name Dispense Instructions for use Diagnosis    ACYCLOVIR PO      Take 400 mg by mouth 2 times daily        aspirin 81 MG chewable tablet      Take 81 mg by mouth daily        calcium carbonate-vitamin D 600-400 MG-UNIT Chew     180 tablet    Take 1 chew tab by mouth 2 times daily    Multiple myeloma not having achieved remission (H)       dexamethasone 4 MG tablet    DECADRON    30 tablet    Take 10 tablets (40 mg) by mouth every 7 days for 3 doses Days 1, 8, and 15.    Multiple myeloma not having achieved remission (H)       LENalidomide 10 MG Caps capsule CHEMO    REVLIMID    14 capsule    Take 1 capsule (10 mg) by mouth daily for 14 days Days 1 through 14.    Multiple myeloma not having achieved remission (H)       lisinopril 10 MG tablet    PRINIVIL/ZESTRIL    90 tablet    Take 1 tablet (10 mg) by mouth daily    Benign essential hypertension       LORazepam 0.5 MG tablet    ATIVAN    10 tablet    Take 1 tablet (0.5 mg) by mouth every 8 hours as needed (Anxiety, Nausea/Vomiting or Sleep)    Multiple myeloma not having achieved remission (H)       nitroGLYcerin 0.4 MG sublingual tablet    NITROSTAT    25 tablet    For chest pain place 1 tablet under the tongue every 5 minutes for 3 doses. If symptoms persist 5 minutes after 1st dose call 911.    Atypical chest pain       * order for DME     1 Units    Please dispense one wheel chair    Multiple myeloma not having achieved remission (H)       * order for DME     1 Units    Dispense one 4 wheeled walker with hand brakes and seat    Multiple myeloma not having achieved remission (H)       * order for DME     1 Units    Wheelchair.    Need for assistance due to unsteady gait       prochlorperazine 10 MG tablet    COMPAZINE    30 tablet    Take 1 tablet (10 mg) by mouth every 6 hours as needed (Nausea/Vomiting)    Multiple  myeloma not having achieved remission (H)       * Notice:  This list has 3 medication(s) that are the same as other medications prescribed for you. Read the directions carefully, and ask your doctor or other care provider to review them with you.

## 2017-11-07 NOTE — PROGRESS NOTES
HEMATOLOGY HISTORY: Mrs. Dunaway is a lady with multiple myeloma (IgG kappa). High risk, t(14;16).  1. She was evaluated for pancytopenia. On 05/01/2017:  -WBC of 3.4, hemoglobin of 10.2 and platelet of 154.    -CMP, Vitamin B12, folate, iron, ferritin, TSH aand haptoglobin are all normal.  2.  CT chest angiogram on 03/07/2017 did not reveal pulmonary embolism.  There is left thyroid nodule measuring 2.6 cm.  There is a 1.2 cm cystic appearing lesion in pancreatic tail.  It could be IPMN.  No splenomegaly.  3.  Colonoscopy on 04/28/2017 revealed colon polyps. Pathology revealed tubular adenoma.  No high-grade dysplasia.  4.  SPEP on 07/07/2017 reveals M-spike of 1.8.  5. Bone marrow biopsy on 07/12/2017 reveals hypercellular bone marrow with kappa monotypic plasma cell population consistent with multiple myeloma.  Bone marrow is 70% cellular.  Plasma cell is 50-60%.     -There is gain of chromosome 1, 5, 9, 15, and 17.  There is translocation 14;16.   6.  Multiple labs were done on 07/18/2017:  -M-spike of 1.9.   -Immunofixation reveals monoclonal IgG kappa.    -IgG level of 2970.  IgA of 15 and IgM of 175.    -Kappa free light chain of 67.7.  Lambda free light chain of 1.42.  Ratio of kappa to lambda of 47.71.    -Beta 2 microglobulin of 3.4.   7. PET scan on 07/24/2017 reveals several focal areas of increased FDG uptake consistent with multiple myeloma.  There is probable epithelial cyst in tail of the pancreas.  No associated abnormal FDG activity.  Left thyroid cysts or nodules without any FDG activity.  There is a 0.3 cm left upper lobe lung nodule.   8.  Velcade, Revlimid and dexamethasone started on 08/14/2017.  9. On 10/02/2017 (after 2 cycles):  -M-spike of 0.5  -Kappa free light chain of 4.3  -IgG of 717.    SUBJECTIVE:    Ms. Quiana Dunaway is an 82-year-old female with high risk IgG kappa multiple myeloma.  She is on Velcade, Revlimid and dexamethasone since 08/2017.  Her disease is responding.       Patient overall is doing good.  Main side effect is fatigue.  Patient says that she has been more tired.  She is able to take care of herself and do normal day to day activity.        REVIEW OF SYSTEMS:    She gets intermittent headache.  Tylenol helps.  No dizziness.  No neck pain.  No chest pain. No difficulty breathing.  No abdominal pain.  She gets some nausea. Compazine helps.  No vomiting.  No urinary or bowel complaints.  No incontinence.  No fever or chills.      Exam:  Alert and oriented × 3.  Eyes: No icterus.    Throat: No ulcer.    Neck: No lymphadenopathy.  Nontender.  Axilla: No lymphadenopathy.  Lungs: Good air entry bilaterally.  No crackles or wheezing.  Heart: Regular.  Abdomen: Soft.  Nondistended.  Nontender.  No masses felt.  Extremities: No edema.  Skin: No petechiae.      LABORATORY DATA:  Reviewed.      ASSESSMENT:   1.  An 82-year-old female with IgG kappa multiple myeloma.  She is on Velcade, Revlimid and dexamethasone.  Diseases is responding.   2.  Fatigue.   3.  Mild anemia which is stable.     4.  Nausea secondary to chemotherapy.      PLAN:   1.  Patient overall is stable from myeloma. Discussed regarding treatment.  She is on Velcade, Revlimid and dexamethasone.  She is responding to it.  She will continue on it.  Side effects reviewed.   2.  Discussed with her regarding fatigue.  I told the patient that fatigue is due to multiple factors including her age, myeloma and chemotherapy.  It can get worse.  If fatigue becomes a significant problem, we can discuss with her regarding discontinuing Revlimid to see if it helps.  Further discussion when fatigue worsens.   She has intermittent numbness. Explained to the patient that she can have worsening neuropathy from Velcade.    If neuropathy becomes a problem, Velcade will be reduced and/or discontinued.   3.  Patient will start Zometa.  Patient had dental extractions 6 weeks ago.  As per the patient, dentist said that she is okay to  start Zometa.  Zometa will be started today.     4.  She had few questions, which were all answered.  Advised her to see a physician if she has fever, chills, recurrent vomiting, bleeding or any other concerns.  I will see her in about a month for followup.         YVES PEREZ MD             D: 2017 14:26   T: 2017 23:45   MT: TD      Name:     DUGLAS CASTILLO   MRN:      [id number]        Account:      [id number]   :      1935           Visit Date:   2017      Document: [id number]

## 2017-11-13 ENCOUNTER — DOCUMENTATION ONLY (OUTPATIENT)
Dept: PHARMACY | Facility: CLINIC | Age: 82
End: 2017-11-13

## 2017-11-13 ENCOUNTER — HOSPITAL ENCOUNTER (OUTPATIENT)
Facility: CLINIC | Age: 82
Setting detail: SPECIMEN
Discharge: HOME | End: 2017-11-13
Attending: INTERNAL MEDICINE | Admitting: INTERNAL MEDICINE
Payer: MEDICARE

## 2017-11-13 ENCOUNTER — INFUSION THERAPY VISIT (OUTPATIENT)
Dept: INFUSION THERAPY | Facility: CLINIC | Age: 82
End: 2017-11-13
Attending: INTERNAL MEDICINE
Payer: MEDICARE

## 2017-11-13 VITALS
DIASTOLIC BLOOD PRESSURE: 56 MMHG | HEART RATE: 60 BPM | WEIGHT: 169.2 LBS | TEMPERATURE: 98 F | BODY MASS INDEX: 30.95 KG/M2 | SYSTOLIC BLOOD PRESSURE: 120 MMHG | RESPIRATION RATE: 16 BRPM

## 2017-11-13 DIAGNOSIS — C90.00 MULTIPLE MYELOMA NOT HAVING ACHIEVED REMISSION (H): Primary | ICD-10-CM

## 2017-11-13 LAB
ALBUMIN SERPL-MCNC: 3.4 G/DL (ref 3.4–5)
ALP SERPL-CCNC: 102 U/L (ref 40–150)
ALT SERPL W P-5'-P-CCNC: 20 U/L (ref 0–50)
ANION GAP SERPL CALCULATED.3IONS-SCNC: 7 MMOL/L (ref 3–14)
AST SERPL W P-5'-P-CCNC: 12 U/L (ref 0–45)
BASOPHILS # BLD AUTO: 0 10E9/L (ref 0–0.2)
BASOPHILS NFR BLD AUTO: 0.4 %
BILIRUB SERPL-MCNC: 0.5 MG/DL (ref 0.2–1.3)
BUN SERPL-MCNC: 17 MG/DL (ref 7–30)
CALCIUM SERPL-MCNC: 8 MG/DL (ref 8.5–10.1)
CHLORIDE SERPL-SCNC: 106 MMOL/L (ref 94–109)
CO2 SERPL-SCNC: 27 MMOL/L (ref 20–32)
CREAT SERPL-MCNC: 0.52 MG/DL (ref 0.52–1.04)
DIFFERENTIAL METHOD BLD: ABNORMAL
EOSINOPHIL # BLD AUTO: 0.1 10E9/L (ref 0–0.7)
EOSINOPHIL NFR BLD AUTO: 1.8 %
ERYTHROCYTE [DISTWIDTH] IN BLOOD BY AUTOMATED COUNT: 14.1 % (ref 10–15)
GFR SERPL CREATININE-BSD FRML MDRD: >90 ML/MIN/1.7M2
GLUCOSE SERPL-MCNC: 102 MG/DL (ref 70–99)
HCT VFR BLD AUTO: 34.2 % (ref 35–47)
HGB BLD-MCNC: 11.2 G/DL (ref 11.7–15.7)
IMM GRANULOCYTES # BLD: 0 10E9/L (ref 0–0.4)
IMM GRANULOCYTES NFR BLD: 0.3 %
LYMPHOCYTES # BLD AUTO: 1.7 10E9/L (ref 0.8–5.3)
LYMPHOCYTES NFR BLD AUTO: 22 %
MCH RBC QN AUTO: 29.2 PG (ref 26.5–33)
MCHC RBC AUTO-ENTMCNC: 32.7 G/DL (ref 31.5–36.5)
MCV RBC AUTO: 89 FL (ref 78–100)
MONOCYTES # BLD AUTO: 1.1 10E9/L (ref 0–1.3)
MONOCYTES NFR BLD AUTO: 14.3 %
NEUTROPHILS # BLD AUTO: 4.7 10E9/L (ref 1.6–8.3)
NEUTROPHILS NFR BLD AUTO: 61.2 %
NRBC # BLD AUTO: 0 10*3/UL
NRBC BLD AUTO-RTO: 0 /100
PLATELET # BLD AUTO: 174 10E9/L (ref 150–450)
POTASSIUM SERPL-SCNC: 3.5 MMOL/L (ref 3.4–5.3)
PROT SERPL-MCNC: 6.5 G/DL (ref 6.8–8.8)
RBC # BLD AUTO: 3.84 10E12/L (ref 3.8–5.2)
SODIUM SERPL-SCNC: 140 MMOL/L (ref 133–144)
WBC # BLD AUTO: 7.6 10E9/L (ref 4–11)

## 2017-11-13 PROCEDURE — 83883 ASSAY NEPHELOMETRY NOT SPEC: CPT | Performed by: INTERNAL MEDICINE

## 2017-11-13 PROCEDURE — 84165 PROTEIN E-PHORESIS SERUM: CPT | Performed by: INTERNAL MEDICINE

## 2017-11-13 PROCEDURE — 85025 COMPLETE CBC W/AUTO DIFF WBC: CPT | Performed by: INTERNAL MEDICINE

## 2017-11-13 PROCEDURE — 96401 CHEMO ANTI-NEOPL SQ/IM: CPT

## 2017-11-13 PROCEDURE — 36415 COLL VENOUS BLD VENIPUNCTURE: CPT

## 2017-11-13 PROCEDURE — 25000128 H RX IP 250 OP 636: Mod: JW | Performed by: INTERNAL MEDICINE

## 2017-11-13 PROCEDURE — 00000402 ZZHCL STATISTIC TOTAL PROTEIN: Performed by: INTERNAL MEDICINE

## 2017-11-13 PROCEDURE — 80053 COMPREHEN METABOLIC PANEL: CPT | Performed by: INTERNAL MEDICINE

## 2017-11-13 PROCEDURE — 82784 ASSAY IGA/IGD/IGG/IGM EACH: CPT | Performed by: INTERNAL MEDICINE

## 2017-11-13 RX ADMIN — BORTEZOMIB 2.7 MG: 3.5 INJECTION, POWDER, LYOPHILIZED, FOR SOLUTION INTRAVENOUS; SUBCUTANEOUS at 15:21

## 2017-11-13 ASSESSMENT — PAIN SCALES - GENERAL: PAINLEVEL: NO PAIN (0)

## 2017-11-13 NOTE — PROGRESS NOTES
Oral Chemotherapy Monitoring Program.    Patient currently on revlimid therapy.    Reviewed labs from 11/13/17    No concerning abnormalities.    Will follow up in 3 weeks with repeat labs.    Debra Matos PharmD  November 13, 2017

## 2017-11-13 NOTE — PROGRESS NOTES
Infusion Nursing Note:  Quiana Dunaway presents today for Velcade C5D1.    Patient seen by provider today: No   present during visit today: Not Applicable.    Note: N/A.    Intravenous Access:  Lab draw site left hand, Needle type Butterfly, Gauge 23.  Labs drawn without difficulty    Treatment Conditions:  Lab Results   Component Value Date    HGB 11.2 11/13/2017     Lab Results   Component Value Date    WBC 7.6 11/13/2017      Lab Results   Component Value Date    ANEU 4.7 11/13/2017     Lab Results   Component Value Date     11/13/2017      Lab Results   Component Value Date     11/13/2017                   Lab Results   Component Value Date    POTASSIUM 3.5 11/13/2017           No results found for: MAG         Lab Results   Component Value Date    CR 0.52 11/13/2017                   Lab Results   Component Value Date    HUMBERTO 8.0 11/13/2017                Lab Results   Component Value Date    BILITOTAL 0.5 11/13/2017           Lab Results   Component Value Date    ALBUMIN 3.4 11/13/2017                    Lab Results   Component Value Date    ALT 20 11/13/2017           Lab Results   Component Value Date    AST 12 11/13/2017     Results reviewed, labs MET treatment parameters, ok to proceed with treatment.        Post Infusion Assessment:Patient tolerated injection without incident.  Site patent and intact, free from redness, edema or discomfort.  No evidence of extravasations.  Access discontinued per protocol.    Discharge Plan:   Discharge instructions reviewed with: Patient and Family.  Patient and/or family verbalized understanding of discharge instructions and all questions answered.  Copy of AVS reviewed with patient and/or family.  Patient will return 11/20/2017 for next appointment.  Patient discharged in stable condition accompanied by: self and daughter-in-law.  Departure Mode: Ambulatory.    Ammy Thomas RN

## 2017-11-13 NOTE — MR AVS SNAPSHOT
After Visit Summary   11/13/2017    Quiana Dunaway    MRN: [id number]           Patient Information     Date Of Birth          1935        Visit Information        Provider Department      11/13/2017 2:00 PM  INFUSION CHAIR 12 [facility]        Today's Diagnoses     Multiple myeloma not having achieved remission (H)    -  1       Follow-ups after your visit        Your next 10 appointments already scheduled     Nov 20, 2017  2:00 PM CST   Level 2 with SH INFUSION CHAIR 2   [facility] ([facility])    [facility] [place] [id number]  [place] [address]   [phone]            Nov 27, 2017  2:00 PM CST   Level 2 with SH INFUSION CHAIR 2   [facility] ([facility])    [facility]  [address]  [place] [address]   [phone]            Dec 04, 2017  1:30 PM CST   Level 2 with  INFUSION CHAIR 9   [facility] ([facility])    [facility]  [address]  [place] [address]   [phone]            Dec 04, 2017  2:00 PM CST   Return Visit with Alex Parry MD   [facility] ([facility])    [facility]  [address]  [place] [address]   [phone]            Dec 06, 2017  2:00 PM CST   Remote PPM Check with RIOS TECH1   [facility]   [place] ([facility])    [address] [place] [address] [address]  [place] [address]   [phone]           This appointment is for a remote check of your pacemaker.  This is not an appointment at the office.              Who to contact     If you have questions or need follow up information about today's clinic visit or your schedule please contact [facility] directly at  "[phone].  Normal or non-critical lab and imaging results will be communicated to you by MyChart, letter or phone within 4 business days after the clinic has received the results. If you do not hear from us within 7 days, please contact the clinic through [employer] or phone. If you have a critical or abnormal lab result, we will notify you by phone as soon as possible.  Submit refill requests through [employer] or call your pharmacy and they will forward the refill request to us. Please allow 3 business days for your refill to be completed.          Additional Information About Your Visit        [employer] Information     [employer] lets you send messages to your doctor, view your test results, renew your prescriptions, schedule appointments and more. To sign up, go to [email or web address] . Click on \"Log in\" on the left side of the screen, which will take you to the Welcome page. Then click on \"Sign up Now\" on the right side of the page.     You will be asked to enter the access code listed below, as well as some personal information. Please follow the directions to create your username and password.     Your access code is: [email or web address]  Expires: 1/15/2018  1:13 PM     Your access code will  in 90 days. If you need help or a new code, please call your [place] clinic or [phone].        Care EveryWhere ID     This is your Care EveryWhere ID. This could be used by other organizations to access your [place] medical records  [id number]        Your Vitals Were     Pulse Temperature Respirations BMI (Body Mass Index)          60 98  F (36.7  C) (Oral) 16 30.95 kg/m2         Blood Pressure from Last 3 Encounters:   17 120/56   17 148/74   17 156/81    Weight from Last 3 Encounters:   17 76.7 kg (169 lb 3.2 oz)   17 77.3 kg (170 lb 6.7 oz)   17 77.3 kg (170 lb 6.4 oz)              We Performed the Following     CBC with platelets differential     Comprehensive metabolic panel  "    IgG     Kappa and lambda light chain     Protein electrophoresis        Primary Care Provider Office Phone # Fax #    César GISELE Chung [phone]       [place] [address] [address]        Equal Access to Services     LILLIANA SANDOVAL : Hadii aad ku hadasho Soomaali, waaxda luqadaha, qaybta kaalmada adeegyada, waxay idiin haymarnien adedenzel khlashonda jennifer villeda. So [facility] [phone].    ATENCIÓN: Si habla español, tiene a contreras disposición servicios gratuitos de asistencia lingüística. Llame al [phone].    We comply with applicable federal civil rights laws and [place] laws. We do not discriminate on the basis of race, color, national origin, age, disability, sex, sexual orientation, or gender identity.            Thank you!     Thank you for choosing [facility]  for your care. Our goal is always to provide you with excellent care. Hearing back from our patients is one way we can continue to improve our services. Please take a few minutes to complete the written survey that you may receive in the mail after your visit with us. Thank you!             Your Updated Medication List - Protect others around you: Learn how to safely use, store and throw away your medicines at www.disposemymeds.org.          This list is accurate as of: 11/13/17  3:38 PM.  Always use your most recent med list.                   Brand Name Dispense Instructions for use Diagnosis    ACYCLOVIR PO      Take 400 mg by mouth 2 times daily        aspirin 81 MG chewable tablet      Take 81 mg by mouth daily        calcium carbonate-vitamin D 600-400 MG-UNIT Chew     180 tablet    Take 1 chew tab by mouth 2 times daily    Multiple myeloma not having achieved remission (H)       * dexamethasone 4 MG tablet    DECADRON    30 tablet    Take 10 tablets (40 mg) by mouth every 7 days Days 1, 8, and 15.    Multiple myeloma not having achieved remission (H)       * dexamethasone  4 MG tablet    DECADRON    30 tablet    Take 10 tablets (40 mg) by mouth every 7 days for 3 doses Days 1, 8, and 15.    Multiple myeloma not having achieved remission (H)       LENalidomide 10 MG Caps capsule CHEMO    REVLIMID    14 capsule    Take 1 capsule (10 mg) by mouth daily for 14 days Days 1 through 14. Then off for 7 days.    Multiple myeloma not having achieved remission (H)       lisinopril 10 MG tablet    PRINIVIL/ZESTRIL    90 tablet    Take 1 tablet (10 mg) by mouth daily    Benign essential hypertension       LORazepam 0.5 MG tablet    ATIVAN    10 tablet    Take 1 tablet (0.5 mg) by mouth every 8 hours as needed (Anxiety, Nausea/Vomiting or Sleep)    Multiple myeloma not having achieved remission (H)       nitroGLYcerin 0.4 MG sublingual tablet    NITROSTAT    25 tablet    For chest pain place 1 tablet under the tongue every 5 minutes for 3 doses. If symptoms persist 5 minutes after 1st dose call 911.    Atypical chest pain       * order for DME     1 Units    Dispense one 4 wheeled walker with hand brakes and seat    Multiple myeloma not having achieved remission (H)       * order for DME     1 Units    Wheelchair.    Need for assistance due to unsteady gait       prochlorperazine 10 MG tablet    COMPAZINE    30 tablet    Take 1 tablet (10 mg) by mouth every 6 hours as needed (Nausea/Vomiting)    Multiple myeloma not having achieved remission (H)       * Notice:  This list has 4 medication(s) that are the same as other medications prescribed for you. Read the directions carefully, and ask your doctor or other care provider to review them with you.

## 2017-11-14 LAB
ALBUMIN SERPL ELPH-MCNC: 3.8 G/DL (ref 3.7–5.1)
ALPHA1 GLOB SERPL ELPH-MCNC: 0.4 G/DL (ref 0.2–0.4)
ALPHA2 GLOB SERPL ELPH-MCNC: 0.8 G/DL (ref 0.5–0.9)
B-GLOBULIN SERPL ELPH-MCNC: 0.7 G/DL (ref 0.6–1)
GAMMA GLOB SERPL ELPH-MCNC: 0.5 G/DL (ref 0.7–1.6)
IGG SERPL-MCNC: 552 MG/DL (ref 695–1620)
KAPPA LC UR-MCNC: 2.62 MG/DL (ref 0.33–1.94)
KAPPA LC/LAMBDA SER: 2.98 {RATIO} (ref 0.26–1.65)
LAMBDA LC SERPL-MCNC: 0.88 MG/DL (ref 0.57–2.63)
M PROTEIN SERPL ELPH-MCNC: 0.3 G/DL
PROT PATTERN SERPL ELPH-IMP: ABNORMAL

## 2017-11-20 ENCOUNTER — HOSPITAL ENCOUNTER (OUTPATIENT)
Facility: CLINIC | Age: 82
Setting detail: SPECIMEN
Discharge: HOME | End: 2017-11-20
Attending: INTERNAL MEDICINE | Admitting: INTERNAL MEDICINE
Payer: MEDICARE

## 2017-11-20 ENCOUNTER — INFUSION THERAPY VISIT (OUTPATIENT)
Dept: INFUSION THERAPY | Facility: CLINIC | Age: 82
End: 2017-11-20
Attending: INTERNAL MEDICINE
Payer: MEDICARE

## 2017-11-20 VITALS
BODY MASS INDEX: 30.73 KG/M2 | HEART RATE: 70 BPM | WEIGHT: 168 LBS | TEMPERATURE: 98.3 F | SYSTOLIC BLOOD PRESSURE: 143 MMHG | DIASTOLIC BLOOD PRESSURE: 70 MMHG | RESPIRATION RATE: 16 BRPM

## 2017-11-20 DIAGNOSIS — C90.00 MULTIPLE MYELOMA NOT HAVING ACHIEVED REMISSION (H): Primary | ICD-10-CM

## 2017-11-20 LAB
BASOPHILS # BLD AUTO: 0 10E9/L (ref 0–0.2)
BASOPHILS NFR BLD AUTO: 0.2 %
DIFFERENTIAL METHOD BLD: ABNORMAL
EOSINOPHIL # BLD AUTO: 0.2 10E9/L (ref 0–0.7)
EOSINOPHIL NFR BLD AUTO: 3 %
ERYTHROCYTE [DISTWIDTH] IN BLOOD BY AUTOMATED COUNT: 14 % (ref 10–15)
HCT VFR BLD AUTO: 34.1 % (ref 35–47)
HGB BLD-MCNC: 11.3 G/DL (ref 11.7–15.7)
IMM GRANULOCYTES # BLD: 0 10E9/L (ref 0–0.4)
IMM GRANULOCYTES NFR BLD: 0.6 %
LYMPHOCYTES # BLD AUTO: 1.3 10E9/L (ref 0.8–5.3)
LYMPHOCYTES NFR BLD AUTO: 20.4 %
MCH RBC QN AUTO: 29 PG (ref 26.5–33)
MCHC RBC AUTO-ENTMCNC: 33.1 G/DL (ref 31.5–36.5)
MCV RBC AUTO: 88 FL (ref 78–100)
MONOCYTES # BLD AUTO: 0.5 10E9/L (ref 0–1.3)
MONOCYTES NFR BLD AUTO: 8 %
NEUTROPHILS # BLD AUTO: 4.2 10E9/L (ref 1.6–8.3)
NEUTROPHILS NFR BLD AUTO: 67.8 %
NRBC # BLD AUTO: 0 10*3/UL
NRBC BLD AUTO-RTO: 0 /100
PLATELET # BLD AUTO: 167 10E9/L (ref 150–450)
RBC # BLD AUTO: 3.89 10E12/L (ref 3.8–5.2)
WBC # BLD AUTO: 6.2 10E9/L (ref 4–11)

## 2017-11-20 PROCEDURE — 25000128 H RX IP 250 OP 636: Mod: JW | Performed by: INTERNAL MEDICINE

## 2017-11-20 PROCEDURE — 36415 COLL VENOUS BLD VENIPUNCTURE: CPT

## 2017-11-20 PROCEDURE — 85025 COMPLETE CBC W/AUTO DIFF WBC: CPT | Performed by: INTERNAL MEDICINE

## 2017-11-20 PROCEDURE — 96401 CHEMO ANTI-NEOPL SQ/IM: CPT

## 2017-11-20 RX ADMIN — BORTEZOMIB 2.7 MG: 3.5 INJECTION, POWDER, LYOPHILIZED, FOR SOLUTION INTRAVENOUS; SUBCUTANEOUS at 15:11

## 2017-11-20 ASSESSMENT — PAIN SCALES - GENERAL: PAINLEVEL: NO PAIN (0)

## 2017-11-20 NOTE — PROGRESS NOTES
Infusion Nursing Note:  Quiana Dunaway presents today for Velcade C5D8.    Patient seen by provider today: No   present during visit today: Not Applicable.    Note: lots of N/V/D last Wednesday. Patient states wednesday's is usually her worst day. Monday's she feels best.     Intravenous Access:  Lab draw site Right lower forearm, Needle type Butterfly, Gauge 23.    Treatment Conditions:  Lab Results   Component Value Date    HGB 11.3 11/20/2017     Lab Results   Component Value Date    WBC 6.2 11/20/2017      Lab Results   Component Value Date    ANEU 4.2 11/20/2017     Lab Results   Component Value Date     11/20/2017      Results reviewed, labs MET treatment parameters, ok to proceed with treatment.    Post Infusion Assessment:Patient tolerated injection without incident.  Site patent and intact, free from redness, edema or discomfort.  No evidence of extravasations.    Discharge Plan:   Refill given for Acyclovir.  Discharge instructions reviewed with: Patient and Family.  Patient and/or family verbalized understanding of discharge instructions and all questions answered.  Copy of AVS reviewed with patient and/or family.  Patient will return 11/27/2017 for next appointment.  Patient discharged in stable condition accompanied by: self and daughter-in-law.  Departure Mode: Ambulatory.    Ammy Thomas RN

## 2017-11-21 RX ORDER — METHYLPREDNISOLONE SODIUM SUCCINATE 125 MG/2ML
125 INJECTION, POWDER, LYOPHILIZED, FOR SOLUTION INTRAMUSCULAR; INTRAVENOUS
Status: CANCELLED
Start: 2017-12-25

## 2017-11-21 RX ORDER — MEPERIDINE HYDROCHLORIDE 50 MG/ML
25 INJECTION INTRAMUSCULAR; INTRAVENOUS; SUBCUTANEOUS EVERY 30 MIN PRN
Status: CANCELLED | OUTPATIENT
Start: 2017-12-04

## 2017-11-21 RX ORDER — ALBUTEROL SULFATE 0.83 MG/ML
2.5 SOLUTION RESPIRATORY (INHALATION)
Status: CANCELLED | OUTPATIENT
Start: 2017-12-18

## 2017-11-21 RX ORDER — SODIUM CHLORIDE 9 MG/ML
1000 INJECTION, SOLUTION INTRAVENOUS CONTINUOUS PRN
Status: CANCELLED
Start: 2017-12-25

## 2017-11-21 RX ORDER — EPINEPHRINE 0.3 MG/.3ML
0.3 INJECTION SUBCUTANEOUS EVERY 5 MIN PRN
Status: CANCELLED | OUTPATIENT
Start: 2017-12-25

## 2017-11-21 RX ORDER — METHYLPREDNISOLONE SODIUM SUCCINATE 125 MG/2ML
125 INJECTION, POWDER, LYOPHILIZED, FOR SOLUTION INTRAMUSCULAR; INTRAVENOUS
Status: CANCELLED
Start: 2017-12-04

## 2017-11-21 RX ORDER — EPINEPHRINE 1 MG/ML
0.3 INJECTION, SOLUTION INTRAMUSCULAR; SUBCUTANEOUS EVERY 5 MIN PRN
Status: CANCELLED | OUTPATIENT
Start: 2017-12-25

## 2017-11-21 RX ORDER — METHYLPREDNISOLONE SODIUM SUCCINATE 125 MG/2ML
125 INJECTION, POWDER, LYOPHILIZED, FOR SOLUTION INTRAMUSCULAR; INTRAVENOUS
Status: CANCELLED
Start: 2017-12-18

## 2017-11-21 RX ORDER — MEPERIDINE HYDROCHLORIDE 50 MG/ML
25 INJECTION INTRAMUSCULAR; INTRAVENOUS; SUBCUTANEOUS EVERY 30 MIN PRN
Status: CANCELLED | OUTPATIENT
Start: 2017-12-25

## 2017-11-21 RX ORDER — EPINEPHRINE 1 MG/ML
0.3 INJECTION, SOLUTION INTRAMUSCULAR; SUBCUTANEOUS EVERY 5 MIN PRN
Status: CANCELLED | OUTPATIENT
Start: 2017-12-04

## 2017-11-21 RX ORDER — SODIUM CHLORIDE 9 MG/ML
1000 INJECTION, SOLUTION INTRAVENOUS CONTINUOUS PRN
Status: CANCELLED
Start: 2017-12-18

## 2017-11-21 RX ORDER — MEPERIDINE HYDROCHLORIDE 50 MG/ML
25 INJECTION INTRAMUSCULAR; INTRAVENOUS; SUBCUTANEOUS EVERY 30 MIN PRN
Status: CANCELLED | OUTPATIENT
Start: 2017-12-18

## 2017-11-21 RX ORDER — DIPHENHYDRAMINE HYDROCHLORIDE 50 MG/ML
50 INJECTION INTRAMUSCULAR; INTRAVENOUS
Status: CANCELLED
Start: 2017-12-25

## 2017-11-21 RX ORDER — LORAZEPAM 2 MG/ML
0.5 INJECTION INTRAMUSCULAR EVERY 4 HOURS PRN
Status: CANCELLED
Start: 2017-12-18

## 2017-11-21 RX ORDER — ALBUTEROL SULFATE 90 UG/1
1-2 AEROSOL, METERED RESPIRATORY (INHALATION)
Status: CANCELLED
Start: 2017-12-04

## 2017-11-21 RX ORDER — EPINEPHRINE 0.3 MG/.3ML
0.3 INJECTION SUBCUTANEOUS EVERY 5 MIN PRN
Status: CANCELLED | OUTPATIENT
Start: 2017-12-04

## 2017-11-21 RX ORDER — SODIUM CHLORIDE 9 MG/ML
1000 INJECTION, SOLUTION INTRAVENOUS CONTINUOUS PRN
Status: CANCELLED
Start: 2017-12-04

## 2017-11-21 RX ORDER — LORAZEPAM 2 MG/ML
0.5 INJECTION INTRAMUSCULAR EVERY 4 HOURS PRN
Status: CANCELLED
Start: 2017-12-25

## 2017-11-21 RX ORDER — EPINEPHRINE 1 MG/ML
0.3 INJECTION, SOLUTION INTRAMUSCULAR; SUBCUTANEOUS EVERY 5 MIN PRN
Status: CANCELLED | OUTPATIENT
Start: 2017-12-18

## 2017-11-21 RX ORDER — ALBUTEROL SULFATE 0.83 MG/ML
2.5 SOLUTION RESPIRATORY (INHALATION)
Status: CANCELLED | OUTPATIENT
Start: 2017-12-04

## 2017-11-21 RX ORDER — EPINEPHRINE 0.3 MG/.3ML
0.3 INJECTION SUBCUTANEOUS EVERY 5 MIN PRN
Status: CANCELLED | OUTPATIENT
Start: 2017-12-18

## 2017-11-21 RX ORDER — LORAZEPAM 2 MG/ML
0.5 INJECTION INTRAMUSCULAR EVERY 4 HOURS PRN
Status: CANCELLED
Start: 2017-12-04

## 2017-11-21 RX ORDER — DIPHENHYDRAMINE HYDROCHLORIDE 50 MG/ML
50 INJECTION INTRAMUSCULAR; INTRAVENOUS
Status: CANCELLED
Start: 2017-12-18

## 2017-11-21 RX ORDER — ALBUTEROL SULFATE 90 UG/1
1-2 AEROSOL, METERED RESPIRATORY (INHALATION)
Status: CANCELLED
Start: 2017-12-18

## 2017-11-21 RX ORDER — DIPHENHYDRAMINE HYDROCHLORIDE 50 MG/ML
50 INJECTION INTRAMUSCULAR; INTRAVENOUS
Status: CANCELLED
Start: 2017-12-04

## 2017-11-21 RX ORDER — ALBUTEROL SULFATE 90 UG/1
1-2 AEROSOL, METERED RESPIRATORY (INHALATION)
Status: CANCELLED
Start: 2017-12-25

## 2017-11-21 RX ORDER — ALBUTEROL SULFATE 0.83 MG/ML
2.5 SOLUTION RESPIRATORY (INHALATION)
Status: CANCELLED | OUTPATIENT
Start: 2017-12-25

## 2017-11-27 ENCOUNTER — INFUSION THERAPY VISIT (OUTPATIENT)
Dept: INFUSION THERAPY | Facility: CLINIC | Age: 82
End: 2017-11-27
Attending: INTERNAL MEDICINE
Payer: MEDICARE

## 2017-11-27 ENCOUNTER — HOSPITAL ENCOUNTER (OUTPATIENT)
Facility: CLINIC | Age: 82
Setting detail: SPECIMEN
Discharge: HOME | End: 2017-11-27
Attending: INTERNAL MEDICINE | Admitting: INTERNAL MEDICINE
Payer: MEDICARE

## 2017-11-27 VITALS
TEMPERATURE: 98.4 F | SYSTOLIC BLOOD PRESSURE: 143 MMHG | HEIGHT: 62 IN | HEART RATE: 85 BPM | DIASTOLIC BLOOD PRESSURE: 69 MMHG | RESPIRATION RATE: 20 BRPM | OXYGEN SATURATION: 95 % | BODY MASS INDEX: 30.84 KG/M2 | WEIGHT: 167.6 LBS

## 2017-11-27 DIAGNOSIS — C90.00 MULTIPLE MYELOMA NOT HAVING ACHIEVED REMISSION (H): Primary | ICD-10-CM

## 2017-11-27 LAB
BASOPHILS # BLD AUTO: 0 10E9/L (ref 0–0.2)
BASOPHILS NFR BLD AUTO: 0.4 %
DIFFERENTIAL METHOD BLD: ABNORMAL
EOSINOPHIL # BLD AUTO: 0.2 10E9/L (ref 0–0.7)
EOSINOPHIL NFR BLD AUTO: 4.1 %
ERYTHROCYTE [DISTWIDTH] IN BLOOD BY AUTOMATED COUNT: 14.1 % (ref 10–15)
HCT VFR BLD AUTO: 34.9 % (ref 35–47)
HGB BLD-MCNC: 11.2 G/DL (ref 11.7–15.7)
IMM GRANULOCYTES # BLD: 0 10E9/L (ref 0–0.4)
IMM GRANULOCYTES NFR BLD: 0.5 %
LYMPHOCYTES # BLD AUTO: 1.6 10E9/L (ref 0.8–5.3)
LYMPHOCYTES NFR BLD AUTO: 27.9 %
MCH RBC QN AUTO: 28.2 PG (ref 26.5–33)
MCHC RBC AUTO-ENTMCNC: 32.1 G/DL (ref 31.5–36.5)
MCV RBC AUTO: 88 FL (ref 78–100)
MONOCYTES # BLD AUTO: 1.1 10E9/L (ref 0–1.3)
MONOCYTES NFR BLD AUTO: 19.5 %
NEUTROPHILS # BLD AUTO: 2.7 10E9/L (ref 1.6–8.3)
NEUTROPHILS NFR BLD AUTO: 47.6 %
PLATELET # BLD AUTO: 197 10E9/L (ref 150–450)
RBC # BLD AUTO: 3.97 10E12/L (ref 3.8–5.2)
WBC # BLD AUTO: 5.6 10E9/L (ref 4–11)

## 2017-11-27 PROCEDURE — 25000128 H RX IP 250 OP 636: Performed by: INTERNAL MEDICINE

## 2017-11-27 PROCEDURE — 96401 CHEMO ANTI-NEOPL SQ/IM: CPT

## 2017-11-27 PROCEDURE — 36415 COLL VENOUS BLD VENIPUNCTURE: CPT

## 2017-11-27 PROCEDURE — 85025 COMPLETE CBC W/AUTO DIFF WBC: CPT | Performed by: INTERNAL MEDICINE

## 2017-11-27 RX ORDER — LENALIDOMIDE 10 MG/1
10 CAPSULE ORAL DAILY
Qty: 14 CAPSULE | Refills: 0 | Status: SHIPPED | OUTPATIENT
Start: 2017-11-27 | End: 2018-12-26

## 2017-11-27 RX ORDER — DEXAMETHASONE 4 MG/1
40 TABLET ORAL
Qty: 30 TABLET | Refills: 0 | Status: SHIPPED | OUTPATIENT
Start: 2017-11-27 | End: 2017-12-12

## 2017-11-27 RX ADMIN — BORTEZOMIB 2.7 MG: 3.5 INJECTION, POWDER, LYOPHILIZED, FOR SOLUTION INTRAVENOUS; SUBCUTANEOUS at 14:40

## 2017-11-27 ASSESSMENT — PAIN SCALES - GENERAL: PAINLEVEL: NO PAIN (0)

## 2017-11-27 NOTE — MR AVS SNAPSHOT
After Visit Summary   11/27/2017    Quiana Dunaway    MRN: [id number]           Patient Information     Date Of Birth          1935        Visit Information        Provider Department      11/27/2017 2:00 PM  INFUSION CHAIR 2 [facility]        Today's Diagnoses     Multiple myeloma not having achieved remission (H)    -  1       Follow-ups after your visit        Follow-up notes from your care team     Return in 7 days (on 12/4/2017).      Your next 10 appointments already scheduled     Dec 04, 2017  1:30 PM CST   Level 2 with  INFUSION CHAIR 9   [facility] ([facility])    [facility]  [address]  [place] [address] [phone]            Dec 04, 2017  2:00 PM CST   Return Visit with Alex Parry MD   [facility] ([facility])    [facility]  [address]  [place] [address] [phone]            Dec 06, 2017  2:00 PM CST   Remote PPM Check with RIOS TECH1   [facility] ([facility])    [address]  [place] [address] [phone]           This appointment is for a remote check of your pacemaker.  This is not an appointment at the office.            Dec 11, 2017  1:00 PM CST   Level 2 with  INFUSION CHAIR 6   [facility] ([facility])    [facility]  [address]  [place] [address] [phone]            Dec 18, 2017  2:00 PM CST   Level 2 with  INFUSION CHAIR 13   [facility] ([facility])    [facility]  [address]  [place] [address] [phone]              Who to contact     If you have questions or need follow up information about today's clinic visit or your  "schedule please contact [facility] directly at [phone].  Normal or non-critical lab and imaging results will be communicated to you by MyChart, letter or phone within 4 business days after the clinic has received the results. If you do not hear from us within 7 days, please contact the clinic through [employer] or phone. If you have a critical or abnormal lab result, we will notify you by phone as soon as possible.  Submit refill requests through [employer] or call your pharmacy and they will forward the refill request to us. Please allow 3 business days for your refill to be completed.          Additional Information About Your Visit        [employer] Information     [employer] lets you send messages to your doctor, view your test results, renew your prescriptions, schedule appointments and more. To sign up, go to [email or web address] . Click on \"Log in\" on the left side of the screen, which will take you to the Welcome page. Then click on \"Sign up Now\" on the right side of the page.     You will be asked to enter the access code listed below, as well as some personal information. Please follow the directions to create your username and password.     Your access code is: [email or web address]  Expires: 1/15/2018  1:13 PM     Your access code will  in 90 days. If you need help or a new code, please call your [place] clinic or [phone].        Care EveryWhere ID     This is your Care EveryWhere ID. This could be used by other organizations to access your [place] medical records  [id number]        Your Vitals Were     Pulse Temperature Respirations Height Pulse Oximetry BMI (Body Mass Index)    85 98.4  F (36.9  C) (Oral) 20 1.575 m (5' 2.01\") 95% 30.65 kg/m2       Blood Pressure from Last 3 Encounters:   17 143/69   17 143/70   17 120/56    Weight from Last 3 Encounters:   17 76 kg (167 lb 9.6 oz)   17 76.2 kg (168 lb)   17 76.7 kg (169 lb 3.2 oz) "              We Performed the Following     CBC with platelets differential        Primary Care Provider Office Phone # Fax #    César Chung [phone]       [place] [address] [address]        Equal Access to Services     PAMELLAKEVON LORI : Hadii aad ku hadasho Soomaali, waaxda luqadaha, qaybta kaalmada adeegyada, waxay idiin haymarnien adedenzel young jennifer . So [facility] [phone].    ATENCIÓN: Si habla español, tiene a contreras disposición servicios gratuitos de asistencia lingüística. Llame al [phone].    We comply with applicable federal civil rights laws and [place] laws. We do not discriminate on the basis of race, color, national origin, age, disability, sex, sexual orientation, or gender identity.            Thank you!     Thank you for choosing [facility]  for your care. Our goal is always to provide you with excellent care. Hearing back from our patients is one way we can continue to improve our services. Please take a few minutes to complete the written survey that you may receive in the mail after your visit with us. Thank you!             Your Updated Medication List - Protect others around you: Learn how to safely use, store and throw away your medicines at www.disposemymeds.org.          This list is accurate as of: 11/27/17  3:05 PM.  Always use your most recent med list.                   Brand Name Dispense Instructions for use Diagnosis    ACYCLOVIR PO      Take 400 mg by mouth 2 times daily        aspirin 81 MG chewable tablet      Take 81 mg by mouth daily        calcium carbonate-vitamin D 600-400 MG-UNIT Chew     180 tablet    Take 1 chew tab by mouth 2 times daily    Multiple myeloma not having achieved remission (H)       * dexamethasone 4 MG tablet    DECADRON    30 tablet    Take 10 tablets (40 mg) by mouth every 7 days Days 1, 8, and 15.    Multiple myeloma not having achieved remission (H)       *  dexamethasone 4 MG tablet    DECADRON    30 tablet    Take 10 tablets (40 mg) by mouth every 7 days for 3 doses Days 1, 8, and 15.    Multiple myeloma not having achieved remission (H)       LENalidomide 10 MG Caps capsule CHEMO    REVLIMID    14 capsule    Take 1 capsule (10 mg) by mouth daily for 14 days Days 1 through 14. Then off for 7 days.    Multiple myeloma not having achieved remission (H)       lisinopril 10 MG tablet    PRINIVIL/ZESTRIL    90 tablet    Take 1 tablet (10 mg) by mouth daily    Benign essential hypertension       LORazepam 0.5 MG tablet    ATIVAN    10 tablet    Take 1 tablet (0.5 mg) by mouth every 8 hours as needed (Anxiety, Nausea/Vomiting or Sleep)    Multiple myeloma not having achieved remission (H)       nitroGLYcerin 0.4 MG sublingual tablet    NITROSTAT    25 tablet    For chest pain place 1 tablet under the tongue every 5 minutes for 3 doses. If symptoms persist 5 minutes after 1st dose call 911.    Atypical chest pain       * order for DME     1 Units    Dispense one 4 wheeled walker with hand brakes and seat    Multiple myeloma not having achieved remission (H)       * order for DME     1 Units    Wheelchair.    Need for assistance due to unsteady gait       prochlorperazine 10 MG tablet    COMPAZINE    30 tablet    Take 1 tablet (10 mg) by mouth every 6 hours as needed (Nausea/Vomiting)    Multiple myeloma not having achieved remission (H)       * Notice:  This list has 4 medication(s) that are the same as other medications prescribed for you. Read the directions carefully, and ask your doctor or other care provider to review them with you.

## 2017-12-01 ENCOUNTER — TELEPHONE (OUTPATIENT)
Dept: PHARMACY | Facility: CLINIC | Age: 82
End: 2017-12-01

## 2017-12-01 NOTE — ORAL ONC MGMT
Received a call from Quiana's daughter in law. Quiana has not been feeling well and also has a cough. She was prescribed tessalon perls and they wanted to know if they would interact with any of her medications. Reviewed interactions on Micromedex and there are no interactions with her current medications and the tessalon perls. Relayed this information to her. She will call if she has any further questions. Debra Matos PharmD December 1, 2017

## 2017-12-04 ENCOUNTER — ONCOLOGY VISIT (OUTPATIENT)
Dept: ONCOLOGY | Facility: CLINIC | Age: 82
End: 2017-12-04
Attending: INTERNAL MEDICINE
Payer: MEDICARE

## 2017-12-04 ENCOUNTER — HOSPITAL ENCOUNTER (OUTPATIENT)
Facility: CLINIC | Age: 82
Setting detail: SPECIMEN
Discharge: HOME | End: 2017-12-04
Attending: INTERNAL MEDICINE | Admitting: INTERNAL MEDICINE
Payer: MEDICARE

## 2017-12-04 ENCOUNTER — DOCUMENTATION ONLY (OUTPATIENT)
Dept: PHARMACY | Facility: CLINIC | Age: 82
End: 2017-12-04

## 2017-12-04 ENCOUNTER — INFUSION THERAPY VISIT (OUTPATIENT)
Dept: INFUSION THERAPY | Facility: CLINIC | Age: 82
End: 2017-12-04
Attending: INTERNAL MEDICINE
Payer: MEDICARE

## 2017-12-04 VITALS
OXYGEN SATURATION: 90 % | TEMPERATURE: 98.8 F | HEART RATE: 87 BPM | RESPIRATION RATE: 18 BRPM | SYSTOLIC BLOOD PRESSURE: 140 MMHG | DIASTOLIC BLOOD PRESSURE: 78 MMHG

## 2017-12-04 VITALS
DIASTOLIC BLOOD PRESSURE: 78 MMHG | SYSTOLIC BLOOD PRESSURE: 140 MMHG | OXYGEN SATURATION: 90 % | HEART RATE: 87 BPM | WEIGHT: 158.2 LBS | HEIGHT: 62 IN | BODY MASS INDEX: 29.11 KG/M2 | RESPIRATION RATE: 18 BRPM | TEMPERATURE: 98.8 F

## 2017-12-04 DIAGNOSIS — E87.6 HYPOKALEMIA: ICD-10-CM

## 2017-12-04 DIAGNOSIS — C90.00 MULTIPLE MYELOMA NOT HAVING ACHIEVED REMISSION (H): Primary | ICD-10-CM

## 2017-12-04 LAB
ALBUMIN SERPL-MCNC: 3.3 G/DL (ref 3.4–5)
ALP SERPL-CCNC: 90 U/L (ref 40–150)
ALT SERPL W P-5'-P-CCNC: 18 U/L (ref 0–50)
ANION GAP SERPL CALCULATED.3IONS-SCNC: 8 MMOL/L (ref 3–14)
AST SERPL W P-5'-P-CCNC: 17 U/L (ref 0–45)
BASOPHILS # BLD AUTO: 0.1 10E9/L (ref 0–0.2)
BASOPHILS NFR BLD AUTO: 3 %
BILIRUB SERPL-MCNC: 0.4 MG/DL (ref 0.2–1.3)
BUN SERPL-MCNC: 15 MG/DL (ref 7–30)
CALCIUM SERPL-MCNC: 8.1 MG/DL (ref 8.5–10.1)
CHLORIDE SERPL-SCNC: 106 MMOL/L (ref 94–109)
CO2 SERPL-SCNC: 27 MMOL/L (ref 20–32)
CREAT SERPL-MCNC: 0.56 MG/DL (ref 0.52–1.04)
DIFFERENTIAL METHOD BLD: ABNORMAL
EOSINOPHIL # BLD AUTO: 0 10E9/L (ref 0–0.7)
EOSINOPHIL NFR BLD AUTO: 0 %
ERYTHROCYTE [DISTWIDTH] IN BLOOD BY AUTOMATED COUNT: 14.1 % (ref 10–15)
GFR SERPL CREATININE-BSD FRML MDRD: >90 ML/MIN/1.7M2
GLUCOSE SERPL-MCNC: 95 MG/DL (ref 70–99)
HCT VFR BLD AUTO: 37.8 % (ref 35–47)
HGB BLD-MCNC: 12.6 G/DL (ref 11.7–15.7)
LYMPHOCYTES # BLD AUTO: 1.5 10E9/L (ref 0.8–5.3)
LYMPHOCYTES NFR BLD AUTO: 48 %
MCH RBC QN AUTO: 28.5 PG (ref 26.5–33)
MCHC RBC AUTO-ENTMCNC: 33.3 G/DL (ref 31.5–36.5)
MCV RBC AUTO: 86 FL (ref 78–100)
MONOCYTES # BLD AUTO: 0.4 10E9/L (ref 0–1.3)
MONOCYTES NFR BLD AUTO: 13 %
NEUTROPHILS # BLD AUTO: 1.2 10E9/L (ref 1.6–8.3)
NEUTROPHILS NFR BLD AUTO: 36 %
PLATELET # BLD AUTO: 125 10E9/L (ref 150–450)
POTASSIUM SERPL-SCNC: 3.1 MMOL/L (ref 3.4–5.3)
PROT SERPL-MCNC: 6.4 G/DL (ref 6.8–8.8)
RBC # BLD AUTO: 4.42 10E12/L (ref 3.8–5.2)
SODIUM SERPL-SCNC: 141 MMOL/L (ref 133–144)
WBC # BLD AUTO: 3.2 10E9/L (ref 4–11)

## 2017-12-04 PROCEDURE — 82784 ASSAY IGA/IGD/IGG/IGM EACH: CPT | Performed by: INTERNAL MEDICINE

## 2017-12-04 PROCEDURE — 84165 PROTEIN E-PHORESIS SERUM: CPT | Performed by: INTERNAL MEDICINE

## 2017-12-04 PROCEDURE — 83883 ASSAY NEPHELOMETRY NOT SPEC: CPT

## 2017-12-04 PROCEDURE — 99211 OFF/OP EST MAY X REQ PHY/QHP: CPT

## 2017-12-04 PROCEDURE — 99214 OFFICE O/P EST MOD 30 MIN: CPT | Performed by: INTERNAL MEDICINE

## 2017-12-04 PROCEDURE — 80053 COMPREHEN METABOLIC PANEL: CPT | Performed by: INTERNAL MEDICINE

## 2017-12-04 PROCEDURE — 36415 COLL VENOUS BLD VENIPUNCTURE: CPT

## 2017-12-04 PROCEDURE — 00000402 ZZHCL STATISTIC TOTAL PROTEIN: Performed by: INTERNAL MEDICINE

## 2017-12-04 PROCEDURE — 85025 COMPLETE CBC W/AUTO DIFF WBC: CPT | Performed by: INTERNAL MEDICINE

## 2017-12-04 RX ORDER — POTASSIUM CHLORIDE 750 MG/1
40 TABLET, EXTENDED RELEASE ORAL ONCE
Qty: 4 TABLET | Refills: 0 | Status: SHIPPED | OUTPATIENT
Start: 2017-12-04 | End: 2017-12-04

## 2017-12-04 RX ORDER — ZOLEDRONIC ACID 0.04 MG/ML
4 INJECTION, SOLUTION INTRAVENOUS ONCE
Status: CANCELLED | OUTPATIENT
Start: 2017-12-18 | End: 2017-12-04

## 2017-12-04 ASSESSMENT — PAIN SCALES - GENERAL: PAINLEVEL: NO PAIN (0)

## 2017-12-04 NOTE — PROGRESS NOTES
"Oncology Rooming Note    December 4, 2017 1:57 PM   Quiana Dunaway is a 82 year old female who presents for:    Chief Complaint   Patient presents with     Oncology Clinic Visit     Multiple myeloma not having achieved remission      Initial Vitals: /78  Pulse 87  Temp 98.8  F (37.1  C) (Oral)  Resp 18  SpO2 90% Estimated body mass index is 28.93 kg/(m^2) as calculated from the following:    Height as of an earlier encounter on 12/4/17: 1.575 m (5' 2.01\").    Weight as of an earlier encounter on 12/4/17: 71.8 kg (158 lb 3.2 oz). There is no height or weight on file to calculate BSA.  Data Unavailable Comment: Data Unavailable   No LMP recorded. Patient is postmenopausal.  Allergies reviewed: Yes  Medications reviewed: Yes    Medications: Medication refills not needed today.  Pharmacy name entered into Dr. Jerry's Smooth [employer]:    [facility] - [place], [place] - [address] [facility] [address]  [facility] [address]  [facility] - [place], [place] - [address]    Clinical concerns: None            4 minutes for nursing intake (face to face time)     Isabel Sarmiento MA          DISCHARGE PLAN:  Next appointments: See patient instruction section. Pt instructions reviewed with pt. FAUSTINO Patel notified that treatment is being held today. Pharmacy spoke with pt regarding mediation and potassium. Pt brought to lobby to schedule follow up.  Departure Mode: Ambulatory  Accompanied by: self  3 minutes for nursing discharge (face to face time)   Bonny Sharif CMA      "

## 2017-12-04 NOTE — LETTER
"    12/4/2017         RE: Quiana Dunaway  [address]  [place] [address]        Dear Colleague,    Thank you for referring your patient, Quiana Dunaway, to [facility]. Please see a copy of my visit note below.    Oncology Rooming Note    December 4, 2017 1:57 PM   Quiana Dunaway is a 82 year old female who presents for:    Chief Complaint   Patient presents with     Oncology Clinic Visit     Multiple myeloma not having achieved remission      Initial Vitals: /78  Pulse 87  Temp 98.8  F (37.1  C) (Oral)  Resp 18  SpO2 90% Estimated body mass index is 28.93 kg/(m^2) as calculated from the following:    Height as of an earlier encounter on 12/4/17: 1.575 m (5' 2.01\").    Weight as of an earlier encounter on 12/4/17: 71.8 kg (158 lb 3.2 oz). There is no height or weight on file to calculate BSA.  Data Unavailable Comment: Data Unavailable   No LMP recorded. Patient is postmenopausal.  Allergies reviewed: Yes  Medications reviewed: Yes    Medications: Medication refills not needed today.  Pharmacy name entered into [employer]:    [id number] - [place], [place] - [address] AT [facility] [address]  [facility] [place], [place] - [address]  [facility] - [place], [place] - [address]    Clinical concerns: None            4 minutes for nursing intake (face to face time)     Isabel Sarmiento MA          DISCHARGE PLAN:  Next appointments: See patient instruction section. Pt instructions reviewed with pt. FAUSTINO Patel notified that treatment is being held today. Pharmacy spoke with pt regarding mediation and potassium. Pt brought to lobby to schedule follow up.  Departure Mode: Ambulatory  Accompanied by: self  3 minutes for nursing discharge (face to face time)   Bonny Sharif CMA        SUBJECTIVE:  Ms. Quiana Dunaway is an 82-year-old female with high-risk IgG kappa multiple myeloma.  She is on Velcade, Revlimid " and dexamethasone since 08/2014.  Overall, she has been tolerating it well.  Her disease has been responding.      For the last few days she has not been feeling good.  She has upper respiratory infection.  She has a cough which is mainly nonproductive.  Along with that she has generalized aches and pain.  As per the patient, somebody she knows has similar symptoms and she might have gotten it from her.  The patient's appetite has not been good.  She has been feeling more weak and tired.  She has not been having any fever or chills.      REVIEW OF SYSTEMS:  She denies any headache.  Some lightheadedness.  No ear pain or sore throat.  No chest pain.  She has cough.  Sometimes she gets short of breath after a coughing spell.  No abdominal pain, nausea or vomiting.  Appetite has been decreased.  No urinary or bowel complaints.  No fever, chills or night sweats.      PHYSICAL EXAMINATION:  Unchanged.   LUNGS:  There is occasional wheezing.  Air entry is good otherwise bilaterally.      LABORATORY DATA:  Reviewed.      ASSESSMENT:   1.  An 82-year-old female with IgG kappa multiple myeloma, on Velcade, Revlimid and dexamethasone.  Disease is responding.   2.  Upper respiratory infection of viral origin.   3.  Leukopenia.   4.  Thrombocytopenia.   5.  Hypokalemia.      PLAN:   1.  I discussed with the patient and her daughter regarding myeloma.  The patient is responding to treatment.  Labs on 11/13/2017 reveal further improvement in M spike, IgG level and kappa free light chain.  She is on Velcade, Revlimid and dexamethasone, which she has been tolerating well.      The patient has upper respiratory infection.  Because of that, we will delay the treatment by 1 week.  Advised her to hold Revlimid.  She will not get Velcade today.  She will continue with dexamethasone.      2.  The patient gets Zometa monthly for myeloma.  That will be continued.      3.  The patient had upper respiratory infection of viral origin.   Advised her to rest, drink plenty of fluids and take Tylenol as needed.  She is on acyclovir, which she will continue.  The patient is at risk of bacterial infection.  I told her to see a physician if her symptoms start getting worse or she has fever.      4.  The patient has hypokalemia.  Potassium will be replaced orally.      5.  She and her daughter had a few questions, which were all answered.  I will see her in 3-4 weeks' time for followup.         YVES PEREZ MD             D: 2017 14:46   T: 2017 06:08   MT: [email or web address]      Name:     DUGLAS CASTILLO   MRN:      [id number]        Account:      [id number]   :      1935           Visit Date:   2017      Document: [id number]       Again, thank you for allowing me to participate in the care of your patient.        Sincerely,        Yves Perez MD

## 2017-12-04 NOTE — PATIENT INSTRUCTIONS
Hold velcade and revlimid for 1 week  Oral potassium replacement as per protocol.  Follow up in 3-4 weeks.  Scheduled/Edy    AVS printed and given to patient/edy

## 2017-12-04 NOTE — MR AVS SNAPSHOT
After Visit Summary   12/4/2017    Quiana Dunaway    MRN: [id number]           Patient Information     Date Of Birth          1935        Visit Information        Provider Department      12/4/2017 2:00 PM Alex Parry MD [facility]        Today's Diagnoses     Multiple myeloma not having achieved remission (H)    -  1    Hypokalemia          Care Instructions    Hold velcade and revlimid for 1 week.  Oral potassium replacement as per protocol.  Follow up in 3-4 weeks.          Follow-ups after your visit        Your next 10 appointments already scheduled     Dec 06, 2017  2:00 PM CST   Remote PPM Check with RIOS TECH1   [facility] ([facility])    [address]  [place] [address] [phone]           This appointment is for a remote check of your pacemaker.  This is not an appointment at the office.            Dec 11, 2017  1:00 PM CST   Level 2 with  INFUSION CHAIR 6   [facility] ([facility])    [facility]  [address]  [place] [address]   [phone]            Dec 18, 2017  2:00 PM CST   Level 2 with  INFUSION CHAIR 13   [facility] ([facility])    [facility]  [address]  [place] [address]   [phone]            Jan 02, 2018  1:30 PM CST   Return Visit with  Oncology Nurse   [facility] ([facility])    [facility]  [address]  [place] [address]   [phone]            Jan 02, 2018  2:00 PM CST   Return Visit with Alex Parry MD   [facility] ([facility])    [facility]  [address]  [place] [address]   [phone]            Jan 02, 2018  2:30 PM CST   Level 2 with  INFUSION CHAIR 5    [facility] ([facility] [place])    [facility] [place]  [address]  [place] [address]   [phone]            Jan 08, 2018  2:00 PM CST   Level 2 with SH INFUSION CHAIR 5   [facility] ([facility])    [facility] [place]  [address]  [place] [address]   [phone]            Guru 15, 2018  2:00 PM CST   Level 2 with SH INFUSION CHAIR 5   [facility] [place] [facility] ([facility])    [facility] [place] [facility]  [address]  [place] [address]   [phone]            Jan 22, 2018  2:00 PM CST   Level 2 with SH INFUSION CHAIR 18   [facility] [place] [facility] ([facility])    [facility]  [address]  [place] [address]   [phone]              Future tests that were ordered for you today     Open Standing Orders        Priority Remaining Interval Expires Ordered    Protein electrophoresis Routine 1/1 AM DRAW  12/4/2017    IgG Routine 1/1 AM DRAW  12/4/2017    Kappa and lambda light chain Routine 1/1 AM DRAW  12/4/2017            Who to contact     If you have questions or need follow up information about today's clinic visit or your schedule please contact [facility] directly at [phone].  Normal or non-critical lab and imaging results will be communicated to you by Nursenavhart, letter or phone within 4 business days after the clinic has received the results. If you do not hear from us within 7 days, please contact the clinic through Nursenavhart or phone. If you have a critical or abnormal lab result, we will notify you by phone as soon as possible.  Submit refill requests through FilmLoop or call your pharmacy and they will forward the refill request to us. Please allow 3 business days for your refill to be completed.          Additional  "Information About Your Visit        MyChart Information     KokoChi lets you send messages to your doctor, view your test results, renew your prescriptions, schedule appointments and more. To sign up, go to [email or web address] . Click on \"Log in\" on the left side of the screen, which will take you to the Welcome page. Then click on \"Sign up Now\" on the right side of the page.     You will be asked to enter the access code listed below, as well as some personal information. Please follow the directions to create your username and password.     Your access code is: [email or web address]  Expires: 1/15/2018  1:13 PM     Your access code will  in 90 days. If you need help or a new code, please call your [facility] clinic or [phone].        Care EveryWhere ID     This is your Care EveryWhere ID. This could be used by other organizations to access your [facility] medical records  [id number]        Your Vitals Were     Pulse Temperature Respirations Pulse Oximetry          87 98.8  F (37.1  C) (Oral) 18 90%         Blood Pressure from Last 3 Encounters:   17 140/78   17 140/78   17 143/69    Weight from Last 3 Encounters:   17 71.8 kg (158 lb 3.2 oz)   17 76 kg (167 lb 9.6 oz)   17 76.2 kg (168 lb)              Today, you had the following     No orders found for display         Today's Medication Changes          These changes are accurate as of: 17  2:59 PM.  If you have any questions, ask your nurse or doctor.               Start taking these medicines.        Dose/Directions    potassium chloride 10 MEQ tablet   Commonly known as:  K-TAB,KLOR-CON   Used for:  Hypokalemia   Started by:  Alex Parry MD        Dose:  40 mEq   Take 4 tablets (40 mEq) by mouth once for 1 dose   Quantity:  4 tablet   Refills:  0            Where to get your medicines      These medications were sent to [facility] [place], [place] - [address]  [address] [place], [place] [address]     Phone:  [phone]     potassium chloride 10 MEQ tablet                Primary Care Provider Office Phone # Fax #    César Chung [phone]       [facility] [address]  [facility] [address]        Equal Access to Services     [facility] Hospital[facility] : Hadii aad ku hadasho Soomaali, waaxda luqadaha, qaybta kaalmada adeegyada, waxay idiin hayaan adeeg khlashonda lalylyn . So [facility] [phone].    ATENCIÓN: Si habla español, tiene a contreras disposición servicios gratuitos de asistencia lingüística. [facility] [phone].    We comply with applicable federal civil rights laws and [place] laws. We do not discriminate on the basis of race, color, national origin, age, disability, sex, sexual orientation, or gender identity.            Thank you!     Thank you for choosing [facility]  for your care. Our goal is always to provide you with excellent care. Hearing back from our patients is one way we can continue to improve our services. Please take a few minutes to complete the written survey that you may receive in the mail after your visit with us. Thank you!             Your Updated Medication List - Protect others around you: Learn how to safely use, store and throw away your medicines at www.disposemymeds.org.          This list is accurate as of: 12/4/17  2:59 PM.  Always use your most recent med list.                   Brand Name Dispense Instructions for use Diagnosis    ACYCLOVIR PO      Take 400 mg by mouth 2 times daily        aspirin 81 MG chewable tablet      Take 81 mg by mouth daily        calcium carbonate-vitamin D 600-400 MG-UNIT Chew     180 tablet    Take 1 chew tab by mouth 2 times daily    Multiple myeloma not having achieved remission (H)       * dexamethasone 4 MG tablet    DECADRON    30 tablet    Take 10 tablets (40 mg) by mouth every 7 days Days 1, 8, and 15.    Multiple myeloma not having achieved remission (H)       * dexamethasone 4 MG  tablet    DECADRON    30 tablet    Take 10 tablets (40 mg) by mouth every 7 days for 3 doses Days 1, 8, and 15.    Multiple myeloma not having achieved remission (H)       LENalidomide 10 MG Caps capsule CHEMO    REVLIMID    14 capsule    Take 1 capsule (10 mg) by mouth daily for 14 days Days 1 through 14.    Multiple myeloma not having achieved remission (H)       lisinopril 10 MG tablet    PRINIVIL/ZESTRIL    90 tablet    Take 1 tablet (10 mg) by mouth daily    Benign essential hypertension       LORazepam 0.5 MG tablet    ATIVAN    10 tablet    Take 1 tablet (0.5 mg) by mouth every 8 hours as needed (Anxiety, Nausea/Vomiting or Sleep)    Multiple myeloma not having achieved remission (H)       nitroGLYcerin 0.4 MG sublingual tablet    NITROSTAT    25 tablet    For chest pain place 1 tablet under the tongue every 5 minutes for 3 doses. If symptoms persist 5 minutes after 1st dose call 911.    Atypical chest pain       * order for DME     1 Units    Dispense one 4 wheeled walker with hand brakes and seat    Multiple myeloma not having achieved remission (H)       * order for DME     1 Units    Wheelchair.    Need for assistance due to unsteady gait       potassium chloride 10 MEQ tablet    K-TAB,KLOR-CON    4 tablet    Take 4 tablets (40 mEq) by mouth once for 1 dose    Hypokalemia       prochlorperazine 10 MG tablet    COMPAZINE    30 tablet    Take 1 tablet (10 mg) by mouth every 6 hours as needed (Nausea/Vomiting)    Multiple myeloma not having achieved remission (H)       * Notice:  This list has 4 medication(s) that are the same as other medications prescribed for you. Read the directions carefully, and ask your doctor or other care provider to review them with you.

## 2017-12-04 NOTE — PROGRESS NOTES
Infusion Nursing Note:  Quiana Dunaway presents today for cycle 6 day 1 zometa and velcade.    Patient seen by provider today: Yes: Dr Parry   present during visit today: Not Applicable.    Note: Patient did not return to infusion after exam, chemo and zometa delayed for 1 week per Dr Parry's note.    Intravenous Access:  Lab draw site R AC, Needle type butterfly, Gauge 23.  Labs drawn without difficulty.    Treatment Conditions:  Not Applicable.      Post Infusion Assessment:  Access discontinued per protocol.    Discharge Plan:   Patient discharged in stable condition accompanied by: daughter.  Departure Mode: Ambulatory.    Suzan Ortega RN

## 2017-12-05 LAB
ALBUMIN SERPL ELPH-MCNC: 3.5 G/DL (ref 3.7–5.1)
ALPHA1 GLOB SERPL ELPH-MCNC: 0.4 G/DL (ref 0.2–0.4)
ALPHA2 GLOB SERPL ELPH-MCNC: 0.9 G/DL (ref 0.5–0.9)
B-GLOBULIN SERPL ELPH-MCNC: 0.7 G/DL (ref 0.6–1)
GAMMA GLOB SERPL ELPH-MCNC: 0.5 G/DL (ref 0.7–1.6)
IGG SERPL-MCNC: 504 MG/DL (ref 695–1620)
M PROTEIN SERPL ELPH-MCNC: 0.3 G/DL
PROT PATTERN SERPL ELPH-IMP: ABNORMAL

## 2017-12-05 NOTE — PROGRESS NOTES
HEMATOLOGY HISTORY: Mrs. Dunaway is a lady with multiple myeloma (IgG kappa). High risk, t(14;16).  1. She was evaluated for pancytopenia. On 05/01/2017:  -WBC of 3.4, hemoglobin of 10.2 and platelet of 154.    -CMP, Vitamin B12, folate, iron, ferritin, TSH aand haptoglobin are all normal.  2.  CT chest angiogram on 03/07/2017 did not reveal pulmonary embolism.  There is left thyroid nodule measuring 2.6 cm.  There is a 1.2 cm cystic appearing lesion in pancreatic tail.  It could be IPMN.  No splenomegaly.  3.  Colonoscopy on 04/28/2017 revealed colon polyps. Pathology revealed tubular adenoma.  No high-grade dysplasia.  4.  SPEP on 07/07/2017 reveals M-spike of 1.8.  5. Bone marrow biopsy on 07/12/2017 reveals hypercellular bone marrow with kappa monotypic plasma cell population consistent with multiple myeloma.  Bone marrow is 70% cellular.  Plasma cell is 50-60%.     -There is gain of chromosome 1, 5, 9, 15, and 17.  There is translocation 14;16.   6.  Multiple labs were done on 07/18/2017:  -M-spike of 1.9.   -Immunofixation reveals monoclonal IgG kappa.    -IgG level of 2970.  IgA of 15 and IgM of 175.    -Kappa free light chain of 67.7.  Lambda free light chain of 1.42.  Ratio of kappa to lambda of 47.71.    -Beta 2 microglobulin of 3.4.   7. PET scan on 07/24/2017 reveals several focal areas of increased FDG uptake consistent with multiple myeloma.  There is probable epithelial cyst in tail of the pancreas.  No associated abnormal FDG activity.  Left thyroid cysts or nodules without any FDG activity.  There is a 0.3 cm left upper lobe lung nodule.   8.  Velcade, Revlimid and dexamethasone started on 08/14/2017.  9. On 10/02/2017 (after 2 cycles):  -M-spike of 0.5  -Kappa free light chain of 4.3  -IgG of 717.    SUBJECTIVE:    Ms. Quiana Dunaway is an 82-year-old female with high-risk IgG kappa multiple myeloma.  She is on Velcade, Revlimid and dexamethasone since 08/2017.  Overall, she has been tolerating it  well.  Her disease has been responding.      For the last few days, she has not been feeling good.  She has upper respiratory infection.  She has a cough which is mainly nonproductive.  Along with that she has generalized aches and pain. Somebody she knows has similar symptoms and she might have gotten it from her.  Appetite has not been good.  She has been feeling more weak and tired.  She has not been having any fever or chills.      REVIEW OF SYSTEMS:    She denies any headache.  Some lightheadedness.  No ear pain or sore throat.  No chest pain.  She has cough.  Sometimes she gets short of breath after a coughing spell.  No abdominal pain, nausea or vomiting.  Appetite has been decreased.  No urinary or bowel complaints.  No fever, chills or night sweats.      PHYSICAL EXAMINATION:    Alert and oriented × 3.  Eyes: No icterus.    Throat: No ulcer.    Neck: No lymphadenopathy.  Nontender.  Axilla: No lymphadenopathy.  Lungs: Good air entry bilaterally.  Occasional wheezing.  Heart: Regular.  Abdomen: Soft.  Nondistended.  Nontender.  No masses felt.  Extremities: No edema.  Skin: No petechiae.      LABORATORY DATA:  Reviewed.      ASSESSMENT:   1.  An 82-year-old female with IgG kappa multiple myeloma. She is on Velcade, Revlimid and dexamethasone.  Disease is responding.   2.  Upper respiratory infection of viral origin.   3.  Leukopenia.   4.  Thrombocytopenia.   5.  Hypokalemia.      PLAN:   1.  I discussed with the patient and her daughter regarding myeloma. Patient is responding to treatment.  Labs on 11/13/2017 reveal further improvement in M-spike, IgG level and kappa free light chain.  She is on Velcade, Revlimid and dexamethasone, which she has been tolerating well.      Patient has upper respiratory infection.  Because of that, we will delay the treatment by 1 week.  Advised her to hold Revlimid.  She will not get Velcade today.  She will continue with dexamethasone.      2.  Patient gets Zometa monthly  for myeloma.  That will be continued.      3.  Patient has upper respiratory infection of viral origin.  Advised her to rest, drink plenty of fluids and take Tylenol as needed.  She is on acyclovir, which she will continue.  Patient is at risk of bacterial infection.  I told her to see a physician if her symptoms start getting worse or she has fever.      4.  Patient has hypokalemia.  Potassium will be replaced orally.      5.  She and her daughter had a few questions, which were all answered.  I will see her in 3-4 weeks' time for followup.         YVES PEREZ MD             D: 2017 14:46   T: 2017 06:08   MT: [email or web address]      Name:     DUGLAS CASTILLO   MRN:      [id number]        Account:      [id number]   :      1935           Visit Date:   2017      Document: [id number]

## 2017-12-07 ENCOUNTER — OFFICE VISIT (OUTPATIENT)
Dept: FAMILY MEDICINE | Facility: CLINIC | Age: 82
End: 2017-12-07
Payer: MEDICARE

## 2017-12-07 ENCOUNTER — RADIANT APPOINTMENT (OUTPATIENT)
Dept: GENERAL RADIOLOGY | Facility: CLINIC | Age: 82
End: 2017-12-07
Attending: NURSE PRACTITIONER
Payer: MEDICARE

## 2017-12-07 VITALS
RESPIRATION RATE: 24 BRPM | TEMPERATURE: 98.1 F | BODY MASS INDEX: 29.88 KG/M2 | OXYGEN SATURATION: 94 % | WEIGHT: 163.4 LBS | HEART RATE: 67 BPM | DIASTOLIC BLOOD PRESSURE: 67 MMHG | SYSTOLIC BLOOD PRESSURE: 136 MMHG

## 2017-12-07 DIAGNOSIS — J20.9 ACUTE BRONCHITIS, UNSPECIFIED ORGANISM: ICD-10-CM

## 2017-12-07 DIAGNOSIS — J20.9 ACUTE BRONCHITIS, UNSPECIFIED ORGANISM: Primary | ICD-10-CM

## 2017-12-07 PROCEDURE — 99213 OFFICE O/P EST LOW 20 MIN: CPT | Performed by: NURSE PRACTITIONER

## 2017-12-07 PROCEDURE — 71020 XR CHEST 2 VW: CPT

## 2017-12-07 RX ORDER — CODEINE PHOSPHATE AND GUAIFENESIN 10; 100 MG/5ML; MG/5ML
1 SOLUTION ORAL EVERY 6 HOURS PRN
Qty: 120 ML | Refills: 0 | Status: SHIPPED | OUTPATIENT
Start: 2017-12-07 | End: 2017-12-18

## 2017-12-07 RX ORDER — DOXYCYCLINE 100 MG/1
100 CAPSULE ORAL 2 TIMES DAILY
Qty: 20 CAPSULE | Refills: 0 | Status: SHIPPED | OUTPATIENT
Start: 2017-12-07 | End: 2017-12-18

## 2017-12-07 NOTE — NURSING NOTE
"Chief Complaint   Patient presents with     URI     Cough and upper respiratory        Initial /67  Pulse 67  Temp 98.1  F (36.7  C) (Oral)  Resp 24  Wt 163 lb 6.4 oz (74.1 kg)  SpO2 94%  BMI 29.88 kg/m2 Estimated body mass index is 29.88 kg/(m^2) as calculated from the following:    Height as of 12/4/17: 5' 2.01\" (1.575 m).    Weight as of this encounter: 163 lb 6.4 oz (74.1 kg).  Medication Reconciliation: complete   Lalita Blount MA      "

## 2017-12-07 NOTE — LETTER
[facility]  [address]  [address]  [place]  [address]  Tel: [phone]    December 7, 2017    Quiana Dunaway  [address]  [place] [facility] [address]        Dear Ms. Dunaway,    The radiologist thought you might have the start of a pneumonia, Quiana.  So if you are not getting better or get worse please call me to change the antibiotic.    If you have any further questions or problems, please contact our office.      Sincerely,    Jael Chery NP/ Lauren Rebolledo, CMA  Results for orders placed or performed in visit on 12/07/17   XR Chest 2 Views    Narrative    CHEST TWO VIEWS  12/7/2017 9:48 AM    HISTORY:  Acute bronchitis, unspecified organism.    COMPARISON:  10/29/2017      Impression    IMPRESSION:  Left subclavian cardiac device. Mild peribronchial  cuffing suggests a viral or interstitial process. In addition, there  are a few new faint opacities in the lateral mid right lung. There are  also new streaky opacities in the left midlung. These are nonspecific  but could represent mild infiltrates. Degenerative changes of the  spine.    KALYANI WARE MD               Enclosure: Lab Results

## 2017-12-07 NOTE — MR AVS SNAPSHOT
After Visit Summary   12/7/2017    Quiana Dunaway    MRN: [id number]           Patient Information     Date Of Birth          1935        Visit Information        Provider Department      12/7/2017 9:00 AM Jael Chery APRN [facility]        Today's Diagnoses     Acute bronchitis, unspecified organism    -  1      Care Instructions    Push fluid intake  Begin plain mucinex 600mg twice a day  Take the antibiotic as prescribed  Continue your weaning dose of prednisone  Use the robitussin with codeine at bedtime- caution it is sedating   Follow up with Dr Chung or me next Tuesday or sooner if worsening          Follow-ups after your visit        Your next 10 appointments already scheduled     Dec 11, 2017  1:00 PM CST   Level 2 with  INFUSION CHAIR 6   [facility] ([facility])    [facility] [place] [address]  [place] [address]   [phone]            Dec 18, 2017  2:00 PM CST   Level 2 with  INFUSION CHAIR 13   [facility] ([facility])    [facility] [place] [id number]  [place] [address]   [phone]            Jan 02, 2018  1:30 PM CST   Return Visit with  Oncology Nurse   [facility] ([facility])    [facility]  [address]  [place] [address]   [phone]            Jan 02, 2018  2:00 PM CST   Return Visit with Alex Parry MD   [facility] ([facility])    [facility]  [address]  [place] [address]   [phone]            Jan 02, 2018  2:30 PM CST   Level 2 with  INFUSION CHAIR 5   [facility] ([facility])    [facility]  [address]  [place] [address]   [phone]            Jan 08, 2018   "2:00 PM CST   Level 2 with SH INFUSION CHAIR 5   [facility] ([facility])    [facility]  [address]  [place] [address]   [phone]            Guru 15, 2018  2:00 PM CST   Level 2 with SH INFUSION CHAIR 5   [facility] ([facility])    [facility] [place]  [address]  [place] [address]   [phone]            Jan 22, 2018  2:00 PM CST   Level 2 with SH INFUSION CHAIR 18   [facility] ([facility])    [facility] [place]  [address]  [place] [address] [phone]              Who to contact     If you have questions or need follow up information about today's clinic visit or your schedule please contact [facility] directly at [phone].  Normal or non-critical lab and imaging results will be communicated to you by Inst[employer] Logichart, letter or phone within 4 business days after the clinic has received the results. If you do not hear from us within 7 days, please contact the clinic through News Corpt or phone. If you have a critical or abnormal lab result, we will notify you by phone as soon as possible.  Submit refill requests through Live Life 360 or call your pharmacy and they will forward the refill request to us. Please allow 3 business days for your refill to be completed.          Additional Information About Your Visit        Live [employer] Information     [employer] lets you send messages to your doctor, view your test results, renew your prescriptions, schedule appointments and more. To sign up, go to [email or web address] . Click on \"Log in\" on the left side of the screen, which will take you to the Welcome page. Then click on \"Sign up Now\" on the right side of the page.     You will be asked to enter the access code listed below, as well as some personal information. Please " follow the directions to create your username and password.     Your access code is: [email or web address]  Expires: 1/15/2018  1:13 PM     Your access code will  in 90 days. If you need help or a new code, please call your [place] clinic or [phone].        Care EveryWhere ID     This is your Care EveryWhere ID. This could be used by other organizations to access your [place] medical records  [id number]        Your Vitals Were     Pulse Temperature Respirations Pulse Oximetry BMI (Body Mass Index)       67 98.1  F (36.7  C) (Oral) 24 94% 29.88 kg/m2        Blood Pressure from Last 3 Encounters:   17 136/67   17 140/78   17 140/78    Weight from Last 3 Encounters:   17 163 lb 6.4 oz (74.1 kg)   17 158 lb 3.2 oz (71.8 kg)   17 167 lb 9.6 oz (76 kg)                 Today's Medication Changes          These changes are accurate as of: 17  9:55 AM.  If you have any questions, ask your nurse or doctor.               Start taking these medicines.        Dose/Directions    doxycycline 100 MG capsule   Commonly known as:  VIBRAMYCIN   Used for:  Acute bronchitis, unspecified organism   Started by:  Jael Chery APRN CNP        Dose:  100 mg   Take 1 capsule (100 mg) by mouth 2 times daily   Quantity:  20 capsule   Refills:  0       guaiFENesin-codeine 100-10 MG/5ML Soln solution   Commonly known as:  ROBITUSSIN AC   Used for:  Acute bronchitis, unspecified organism   Started by:  Jael Chery APRN CNP        Dose:  1 tsp.   Take 5 mLs by mouth every 6 hours as needed for cough   Quantity:  120 mL   Refills:  0            Where to get your medicines      These medications were sent to [facility], [place] - [address]  [address], [facility] [address], [facility] [address]     Phone:  [phone]     doxycycline 100 MG capsule         Some of these will need a paper prescription and others can be bought over the counter.  Ask your  nurse if you have questions.     Bring a paper prescription for each of these medications     guaiFENesin-codeine 100-10 MG/5ML Soln solution                Primary Care Provider Office Phone # Fax #    César Chung [phone]       [facility] [address] [facility] [address]  [facility] [address]        Equal Access to Services     [facility]: Hadii aad ku hadasho Soomaali, waaxda luqadaha, qaybta kaalmada adeegyada, waxay idiin hayaan adeeg kharash la'aan . So [facility] [phone].    ATENCIÓN: Si habla español, tiene a contreras disposición servicios gratuitos de asistencia lingüística. Llame al [phone].    We comply with applicable federal civil rights laws and [place] laws. We do not discriminate on the basis of race, color, national origin, age, disability, sex, sexual orientation, or gender identity.            Thank you!     Thank you for choosing [facility]  for your care. Our goal is always to provide you with excellent care. Hearing back from our patients is one way we can continue to improve our services. Please take a few minutes to complete the written survey that you may receive in the mail after your visit with us. Thank you!             Your Updated Medication List - Protect others around you: Learn how to safely use, store and throw away your medicines at www.disposemymeds.org.          This list is accurate as of: 12/7/17  9:55 AM.  Always use your most recent med list.                   Brand Name Dispense Instructions for use Diagnosis    ACYCLOVIR PO      Take 400 mg by mouth 2 times daily        aspirin 81 MG chewable tablet      Take 81 mg by mouth daily        calcium carbonate-vitamin D 600-400 MG-UNIT Chew     180 tablet    Take 1 chew tab by mouth 2 times daily    Multiple myeloma not having achieved remission (H)       * dexamethasone 4 MG tablet    DECADRON    30 tablet    Take 10 tablets (40 mg) by mouth every 7 days Days 1, 8, and 15.    Multiple  myeloma not having achieved remission (H)       * dexamethasone 4 MG tablet    DECADRON    30 tablet    Take 10 tablets (40 mg) by mouth every 7 days for 3 doses Days 1, 8, and 15.    Multiple myeloma not having achieved remission (H)       doxycycline 100 MG capsule    VIBRAMYCIN    20 capsule    Take 1 capsule (100 mg) by mouth 2 times daily    Acute bronchitis, unspecified organism       guaiFENesin-codeine 100-10 MG/5ML Soln solution    ROBITUSSIN AC    120 mL    Take 5 mLs by mouth every 6 hours as needed for cough    Acute bronchitis, unspecified organism       LENalidomide 10 MG Caps capsule CHEMO    REVLIMID    14 capsule    Take 1 capsule (10 mg) by mouth daily for 14 days Days 1 through 14.    Multiple myeloma not having achieved remission (H)       lisinopril 10 MG tablet    PRINIVIL/ZESTRIL    90 tablet    Take 1 tablet (10 mg) by mouth daily    Benign essential hypertension       LORazepam 0.5 MG tablet    ATIVAN    10 tablet    Take 1 tablet (0.5 mg) by mouth every 8 hours as needed (Anxiety, Nausea/Vomiting or Sleep)    Multiple myeloma not having achieved remission (H)       nitroGLYcerin 0.4 MG sublingual tablet    NITROSTAT    25 tablet    For chest pain place 1 tablet under the tongue every 5 minutes for 3 doses. If symptoms persist 5 minutes after 1st dose call 911.    Atypical chest pain       * order for DME     1 Units    Dispense one 4 wheeled walker with hand brakes and seat    Multiple myeloma not having achieved remission (H)       * order for DME     1 Units    Wheelchair.    Need for assistance due to unsteady gait       prochlorperazine 10 MG tablet    COMPAZINE    30 tablet    Take 1 tablet (10 mg) by mouth every 6 hours as needed (Nausea/Vomiting)    Multiple myeloma not having achieved remission (H)       * Notice:  This list has 4 medication(s) that are the same as other medications prescribed for you. Read the directions carefully, and ask your doctor or other care provider to  review them with you.

## 2017-12-07 NOTE — PROGRESS NOTES
SUBJECTIVE:   Quiana Dunaway is a 82 year old female who presents to clinic today for the following health issues:      RESPIRATORY SYMPTOMS      Duration: 8 days     Description  rhinorrhea, cough, headache, fatigue/malaise, myalgias, nausea, stomach ache, diarrhea and difficulty breathing    Severity: severe    Accompanying signs and symptoms: productive cough with yellow flem, difficulty sleeping can't catch, chest congestion     History (predisposing factors):  none    Precipitating or alleviating factors: rest and laying down is difficult     Therapies tried and outcome:  rest and fluids, cough Supressant     Problem list and histories reviewed & adjusted, as indicated.  Additional history: as documented    Patient Active Problem List   Diagnosis     Benign essential hypertension     Pancytopenia (H)     Multiple myeloma not having achieved remission (H)     Benzodiazepine overdose, accidental or unintentional, initial encounter     Atrial fibrillation, unspecified type (H)     Hypokalemia     Past Surgical History:   Procedure Laterality Date     BONE MARROW BIOPSY, BONE SPECIMEN, NEEDLE/TROCAR N/A 7/12/2017    Procedure: BIOPSY BONE MARROW;  BONE MARROW BIOPSY ;  Surgeon: Grace Vela MD;  Location: [facility]       Social History   Substance Use Topics     Smoking status: Never Smoker     Smokeless tobacco: Never Used     Alcohol use No     Family History   Problem Relation Age of Onset     Unknown/Adopted Mother      Unknown/Adopted Father          Current Outpatient Prescriptions   Medication Sig Dispense Refill     doxycycline (VIBRAMYCIN) 100 MG capsule Take 1 capsule (100 mg) by mouth 2 times daily 20 capsule 0     guaiFENesin-codeine (ROBITUSSIN AC) 100-10 MG/5ML SOLN solution Take 5 mLs by mouth every 6 hours as needed for cough 120 mL 0     dexamethasone (DECADRON) 4 MG tablet Take 10 tablets (40 mg) by mouth every 7 days for 3 doses Days 1, 8, and 15. 30 tablet 0     LENalidomide  (REVLIMID) 10 MG CAPS capsule CHEMO Take 1 capsule (10 mg) by mouth daily for 14 days Days 1 through 14. 14 capsule 0     prochlorperazine (COMPAZINE) 10 MG tablet Take 1 tablet (10 mg) by mouth every 6 hours as needed (Nausea/Vomiting) 30 tablet 3     dexamethasone (DECADRON) 4 MG tablet Take 10 tablets (40 mg) by mouth every 7 days Days 1, 8, and 15. 30 tablet 0     LORazepam (ATIVAN) 0.5 MG tablet Take 1 tablet (0.5 mg) by mouth every 8 hours as needed (Anxiety, Nausea/Vomiting or Sleep) 10 tablet 3     ACYCLOVIR PO Take 400 mg by mouth 2 times daily       order for DME Wheelchair. 1 Units 0     lisinopril (PRINIVIL/ZESTRIL) 10 MG tablet Take 1 tablet (10 mg) by mouth daily 90 tablet 3     calcium carbonate-vitamin D 600-400 MG-UNIT CHEW Take 1 chew tab by mouth 2 times daily 180 tablet 3     aspirin 81 MG chewable tablet Take 81 mg by mouth daily        order for DME Dispense one 4 wheeled walker with hand brakes and seat (Patient not taking: Reported on 12/7/2017) 1 Units 0     nitroglycerin (NITROSTAT) 0.4 MG sublingual tablet For chest pain place 1 tablet under the tongue every 5 minutes for 3 doses. If symptoms persist 5 minutes after 1st dose call 911. (Patient not taking: Reported on 12/7/2017) 25 tablet 0     No Known Allergies      Reviewed and updated as needed this visit by clinical staffTobacco  Allergies  Meds  Med Hx  Surg Hx  Fam Hx  Soc Hx      Reviewed and updated as needed this visit by Provider         ROS:  Constitutional, HEENT, cardiovascular, pulmonary, gi and gu systems are negative, except as otherwise noted.      OBJECTIVE:   /67  Pulse 67  Temp 98.1  F (36.7  C) (Oral)  Resp 24  Wt 163 lb 6.4 oz (74.1 kg)  SpO2 94%  BMI 29.88 kg/m2  Body mass index is 29.88 kg/(m^2).  GENERAL: alert, fatigued, mildly toxic    EYES: Eyes grossly normal to inspection, PERRL and conjunctivae and sclerae normal  HENT: ear canals and TM's normal, nose and mouth without ulcers or  lesions  NECK: no adenopathy, no asymmetry, masses,   RESP: lungs low pitched end expiratory wheezing throughout  CV: regular rate and rhythm, normal S1 S2, no S3 or S4, no murmur, click or rub,     Diagnostic Test Results:  Chest xray  Acute bronchitis, unspecified organism.     COMPARISON:  10/29/2017         IMPRESSION:  Left subclavian cardiac device. Mild peribronchial  cuffing suggests a viral or interstitial process. In addition, there  are a few new faint opacities in the lateral mid right lung. There are  also new streaky opacities in the left midlung. These are nonspecific  but could represent mild infiltrates. Degenerative changes of the  spine.       ASSESSMENT/PLAN:       ICD-10-CM    1. Acute bronchitis, unspecified organism J20.9 XR Chest 2 Views     doxycycline (VIBRAMYCIN) 100 MG capsule     guaiFENesin-codeine (ROBITUSSIN AC) 100-10 MG/5ML SOLN solution       Patient Instructions   Push fluid intake  Begin plain mucinex 600mg twice a day  Take the antibiotic as prescribed  Continue your weaning dose of prednisone  Use the robitussin with codeine at bedtime- caution it is sedating   Follow up with Dr Chung or me next Tuesday or sooner if worsening      MARIUSZ [facility]

## 2017-12-07 NOTE — PATIENT INSTRUCTIONS
Push fluid intake  Begin plain mucinex 600mg twice a day  Take the antibiotic as prescribed  Continue your weaning dose of prednisone  Use the robitussin with codeine at bedtime- caution it is sedating   Follow up with Dr Chung or me next Tuesday or sooner if worsening

## 2017-12-07 NOTE — PROGRESS NOTES
The radiologist thought you might have the start of a pneumonia, Quiana.  So if you are not getting better or get worse please call me to change the antibiotic.

## 2017-12-09 ENCOUNTER — NURSE TRIAGE (OUTPATIENT)
Dept: NURSING | Facility: CLINIC | Age: 82
End: 2017-12-09

## 2017-12-10 NOTE — TELEPHONE ENCOUNTER
Additional Information    Negative: [1] Caller is not with the adult (patient) AND [2] reporting urgent symptoms    Negative: Lab result questions    Negative: Medication questions    Negative: Caller cannot be reached by phone    Negative: Caller has already spoken to PCP or another triager    Negative: RN needs further essential information from caller in order to complete triage    Negative: Requesting regular office appointment    Negative: [1] Caller requesting NON-URGENT health information AND [2] PCP's office is the best resource    Negative: Health Information question, no triage required and triager able to answer question    Negative: General information question, no triage required and triager able to answer question    Negative: Question about upcoming scheduled test, no triage required and triager able to answer question    Negative: [1] Caller is not with the adult (patient) AND [2] probable NON-URGENT symptoms    [1] Follow-up call to recent contact AND [2] information only call, no triage required    Protocols used: INFORMATION ONLY CALL-ADULT-JAYDEN Del Angel, Quiana's granddaughter, calls and says that pt. Is not getting better. Mer says that pt. Saw her DrLali, on last Monday and on last Thursday, and was started on Doxycycline and a cough medication. Mer says that pt. Cannot take the cough medicine and just vomits this up. Mer says that pt. Is not any better and keeps coughing. Mer wants to know what pt. Was diagnosed with, when she saw the Dr. RN then checked [facility] and saw that pt. Was diagnosed with acute bronchitis, unspecified organism. RN also told Mer that on 12/7/2017, the radiologist thought that pt. May have the start of pneumonia, according to EPIC.  Mer says that she wants pt. On a different antibiotic, because pt. Is not getting any better. Mer says that she was told that if pt. Is not any better, to call and get a new antibiotic. RN also saw, in [facility], that MARIUSZ Eldridge  CNP, charted this. Dr. Vazquez-[facility]-was then paged, to call this nurse, at: [phone], at: 0690, via page . Dr. Vazquez called this nurse back and was told about pt's symptoms and about Mer's request for a new antibiotic. Dr. Vazquez says that if pt. Is febrile or weak or gets worse,  pt. Needs to go to the ER. Dr. Vazquez says that he is not going to change the antibiotic and will have pt's Dr. Address this on Monday, when pt. Sees the Dr. RN then called Mer back and told Mer this information. Mer says that pt. Is not febrile. Mer voiced understanding.

## 2017-12-11 ENCOUNTER — DOCUMENTATION ONLY (OUTPATIENT)
Dept: PHARMACY | Facility: CLINIC | Age: 82
End: 2017-12-11

## 2017-12-11 ENCOUNTER — INFUSION THERAPY VISIT (OUTPATIENT)
Dept: INFUSION THERAPY | Facility: CLINIC | Age: 82
End: 2017-12-11
Attending: INTERNAL MEDICINE
Payer: MEDICARE

## 2017-12-11 ENCOUNTER — HOSPITAL ENCOUNTER (OUTPATIENT)
Facility: CLINIC | Age: 82
Setting detail: SPECIMEN
Discharge: HOME | End: 2017-12-11
Attending: INTERNAL MEDICINE | Admitting: INTERNAL MEDICINE
Payer: MEDICARE

## 2017-12-11 VITALS
BODY MASS INDEX: 29.07 KG/M2 | TEMPERATURE: 98.4 F | RESPIRATION RATE: 16 BRPM | WEIGHT: 159 LBS | HEART RATE: 68 BPM | SYSTOLIC BLOOD PRESSURE: 136 MMHG | DIASTOLIC BLOOD PRESSURE: 65 MMHG

## 2017-12-11 DIAGNOSIS — C90.00 MULTIPLE MYELOMA NOT HAVING ACHIEVED REMISSION (H): Primary | ICD-10-CM

## 2017-12-11 LAB
ALBUMIN SERPL ELPH-MCNC: NORMAL G/DL (ref 3.7–5.1)
ALBUMIN SERPL-MCNC: 3.2 G/DL (ref 3.4–5)
ALP SERPL-CCNC: 82 U/L (ref 40–150)
ALPHA1 GLOB SERPL ELPH-MCNC: NORMAL G/DL (ref 0.2–0.4)
ALPHA2 GLOB SERPL ELPH-MCNC: NORMAL G/DL (ref 0.5–0.9)
ALT SERPL W P-5'-P-CCNC: 16 U/L (ref 0–50)
ANION GAP SERPL CALCULATED.3IONS-SCNC: 6 MMOL/L (ref 3–14)
AST SERPL W P-5'-P-CCNC: 16 U/L (ref 0–45)
B-GLOBULIN SERPL ELPH-MCNC: NORMAL G/DL (ref 0.6–1)
BASOPHILS # BLD AUTO: 0 10E9/L (ref 0–0.2)
BASOPHILS NFR BLD AUTO: 0.3 %
BILIRUB SERPL-MCNC: 0.5 MG/DL (ref 0.2–1.3)
BUN SERPL-MCNC: 15 MG/DL (ref 7–30)
CALCIUM SERPL-MCNC: 8.7 MG/DL (ref 8.5–10.1)
CHLORIDE SERPL-SCNC: 107 MMOL/L (ref 94–109)
CO2 SERPL-SCNC: 30 MMOL/L (ref 20–32)
CREAT SERPL-MCNC: 0.56 MG/DL (ref 0.52–1.04)
DIFFERENTIAL METHOD BLD: NORMAL
EOSINOPHIL # BLD AUTO: 0.1 10E9/L (ref 0–0.7)
EOSINOPHIL NFR BLD AUTO: 1.2 %
ERYTHROCYTE [DISTWIDTH] IN BLOOD BY AUTOMATED COUNT: 13.8 % (ref 10–15)
GAMMA GLOB SERPL ELPH-MCNC: NORMAL G/DL (ref 0.7–1.6)
GFR SERPL CREATININE-BSD FRML MDRD: >90 ML/MIN/1.7M2
GLUCOSE SERPL-MCNC: 102 MG/DL (ref 70–99)
HCT VFR BLD AUTO: 37.7 % (ref 35–47)
HGB BLD-MCNC: 12.5 G/DL (ref 11.7–15.7)
IGG SERPL-MCNC: NORMAL MG/DL (ref 695–1620)
IMM GRANULOCYTES # BLD: 0 10E9/L (ref 0–0.4)
IMM GRANULOCYTES NFR BLD: 0.7 %
LYMPHOCYTES # BLD AUTO: 1.8 10E9/L (ref 0.8–5.3)
LYMPHOCYTES NFR BLD AUTO: 29.7 %
M PROTEIN SERPL ELPH-MCNC: NORMAL G/DL
MCH RBC QN AUTO: 28.3 PG (ref 26.5–33)
MCHC RBC AUTO-ENTMCNC: 33.2 G/DL (ref 31.5–36.5)
MCV RBC AUTO: 85 FL (ref 78–100)
MONOCYTES # BLD AUTO: 0.5 10E9/L (ref 0–1.3)
MONOCYTES NFR BLD AUTO: 8.6 %
NEUTROPHILS # BLD AUTO: 3.5 10E9/L (ref 1.6–8.3)
NEUTROPHILS NFR BLD AUTO: 59.5 %
NRBC # BLD AUTO: 0 10*3/UL
NRBC BLD AUTO-RTO: 0 /100
PLATELET # BLD AUTO: 401 10E9/L (ref 150–450)
POTASSIUM SERPL-SCNC: 3 MMOL/L (ref 3.4–5.3)
PROT PATTERN SERPL ELPH-IMP: NORMAL
PROT SERPL-MCNC: 6.7 G/DL (ref 6.8–8.8)
RBC # BLD AUTO: 4.42 10E12/L (ref 3.8–5.2)
SODIUM SERPL-SCNC: 143 MMOL/L (ref 133–144)
WBC # BLD AUTO: 5.9 10E9/L (ref 4–11)

## 2017-12-11 PROCEDURE — 25000128 H RX IP 250 OP 636: Performed by: INTERNAL MEDICINE

## 2017-12-11 PROCEDURE — 00000402 ZZHCL STATISTIC TOTAL PROTEIN: Performed by: INTERNAL MEDICINE

## 2017-12-11 PROCEDURE — 85025 COMPLETE CBC W/AUTO DIFF WBC: CPT | Performed by: INTERNAL MEDICINE

## 2017-12-11 PROCEDURE — 84165 PROTEIN E-PHORESIS SERUM: CPT | Performed by: INTERNAL MEDICINE

## 2017-12-11 PROCEDURE — 82784 ASSAY IGA/IGD/IGG/IGM EACH: CPT | Performed by: INTERNAL MEDICINE

## 2017-12-11 PROCEDURE — 96401 CHEMO ANTI-NEOPL SQ/IM: CPT

## 2017-12-11 PROCEDURE — 80053 COMPREHEN METABOLIC PANEL: CPT | Performed by: INTERNAL MEDICINE

## 2017-12-11 PROCEDURE — A9270 NON-COVERED ITEM OR SERVICE: HCPCS | Mod: GY | Performed by: INTERNAL MEDICINE

## 2017-12-11 PROCEDURE — 25000132 ZZH RX MED GY IP 250 OP 250 PS 637: Mod: GY | Performed by: INTERNAL MEDICINE

## 2017-12-11 PROCEDURE — 36415 COLL VENOUS BLD VENIPUNCTURE: CPT

## 2017-12-11 RX ORDER — LORAZEPAM 2 MG/ML
0.5 INJECTION INTRAMUSCULAR EVERY 4 HOURS PRN
Status: CANCELLED
Start: 2017-12-11

## 2017-12-11 RX ORDER — ALBUTEROL SULFATE 90 UG/1
1-2 AEROSOL, METERED RESPIRATORY (INHALATION)
Status: CANCELLED
Start: 2017-12-11

## 2017-12-11 RX ORDER — SODIUM CHLORIDE 9 MG/ML
1000 INJECTION, SOLUTION INTRAVENOUS CONTINUOUS PRN
Status: CANCELLED
Start: 2017-12-11

## 2017-12-11 RX ORDER — MEPERIDINE HYDROCHLORIDE 50 MG/ML
25 INJECTION INTRAMUSCULAR; INTRAVENOUS; SUBCUTANEOUS EVERY 30 MIN PRN
Status: CANCELLED | OUTPATIENT
Start: 2017-12-11

## 2017-12-11 RX ORDER — DEXAMETHASONE 4 MG/1
40 TABLET ORAL
Qty: 30 TABLET | Refills: 0 | Status: SHIPPED | OUTPATIENT
Start: 2017-12-11 | End: 2017-12-18

## 2017-12-11 RX ORDER — LENALIDOMIDE 10 MG/1
10 CAPSULE ORAL DAILY
Qty: 14 CAPSULE | Refills: 0 | Status: SHIPPED | OUTPATIENT
Start: 2017-12-11 | End: 2018-12-26

## 2017-12-11 RX ORDER — EPINEPHRINE 1 MG/ML
0.3 INJECTION, SOLUTION INTRAMUSCULAR; SUBCUTANEOUS EVERY 5 MIN PRN
Status: CANCELLED | OUTPATIENT
Start: 2017-12-11

## 2017-12-11 RX ORDER — POTASSIUM CHLORIDE 750 MG/1
40 TABLET, EXTENDED RELEASE ORAL ONCE
Status: COMPLETED | OUTPATIENT
Start: 2017-12-11 | End: 2017-12-11

## 2017-12-11 RX ORDER — EPINEPHRINE 0.3 MG/.3ML
0.3 INJECTION SUBCUTANEOUS EVERY 5 MIN PRN
Status: CANCELLED | OUTPATIENT
Start: 2017-12-11

## 2017-12-11 RX ORDER — DIPHENHYDRAMINE HYDROCHLORIDE 50 MG/ML
50 INJECTION INTRAMUSCULAR; INTRAVENOUS
Status: CANCELLED
Start: 2017-12-11

## 2017-12-11 RX ORDER — METHYLPREDNISOLONE SODIUM SUCCINATE 125 MG/2ML
125 INJECTION, POWDER, LYOPHILIZED, FOR SOLUTION INTRAMUSCULAR; INTRAVENOUS
Status: CANCELLED
Start: 2017-12-11

## 2017-12-11 RX ORDER — ALBUTEROL SULFATE 0.83 MG/ML
2.5 SOLUTION RESPIRATORY (INHALATION)
Status: CANCELLED | OUTPATIENT
Start: 2017-12-11

## 2017-12-11 RX ADMIN — BORTEZOMIB 2.7 MG: 3.5 INJECTION, POWDER, LYOPHILIZED, FOR SOLUTION INTRAVENOUS; SUBCUTANEOUS at 14:19

## 2017-12-11 RX ADMIN — POTASSIUM CHLORIDE 40 MEQ: 750 TABLET, FILM COATED, EXTENDED RELEASE ORAL at 14:14

## 2017-12-11 ASSESSMENT — PAIN SCALES - GENERAL: PAINLEVEL: NO PAIN (0)

## 2017-12-11 NOTE — PROGRESS NOTES
Oral Chemotherapy Monitoring Program.    Patient currently on Revlimid therapy.    Reviewed labs from 12/11/17    No concerning abnormalities. Start was delayed until today due to an URI. (C6D1 = 12/11/17)    Will follow up in 4 weeks with labs.    Debra Matos PharmD  December 11, 2017

## 2017-12-11 NOTE — MR AVS SNAPSHOT
After Visit Summary   12/11/2017    Quiana Dunaway    MRN: [id number]           Patient Information     Date Of Birth          1935        Visit Information        Provider Department      12/11/2017 1:00 PM  INFUSION CHAIR 6 [facility]        Today's Diagnoses     Multiple myeloma not having achieved remission (H)    -  1       Follow-ups after your visit        Your next 10 appointments already scheduled     Dec 18, 2017  2:00 PM CST   Level 2 with  INFUSION CHAIR 13   [facility] ([facility])    [facility] [place] [id number]  [place] [address]   [phone]            Jan 02, 2018  1:30 PM CST   Return Visit with  Oncology Nurse   [facility] ([facility])    [facility] [place] [id number]  [place] [address]   [phone]            Jan 02, 2018  2:00 PM CST   Return Visit with Alex Parry MD   [facility] ([facility])    [facility] [place] [id number]  [place] [address]   [phone]            Jan 02, 2018  2:30 PM CST   Level 2 with  INFUSION CHAIR 5   [facility] ([facility])    [facility] [place] [id number]  [place] [address]   [phone]            Jan 08, 2018  2:00 PM CST   Level 2 with  INFUSION CHAIR 5   [facility] ([facility])    [facility]  [address]  [place] [address]   [phone]            Guru 15, 2018  2:00 PM CST   Level 2 with  INFUSION CHAIR 5   [facility] ([facility])    [facility]  [address]  [place] [address]   [phone]            Jan 22, 2018  2:00 PM CST   Level 2 with  " INFUSION CHAIR 18   [facility] ([facility])    [facility] [place]  [address]  [place] [address] [phone]              Who to contact     If you have questions or need follow up information about today's clinic visit or your schedule please contact [facility] directly at [phone].  Normal or non-critical lab and imaging results will be communicated to you by [employer], letter or phone within 4 business days after the clinic has received the results. If you do not hear from us within 7 days, please contact the clinic through [employer] or phone. If you have a critical or abnormal lab result, we will notify you by phone as soon as possible.  Submit refill requests through [employer] or call your pharmacy and they will forward the refill request to us. Please allow 3 business days for your refill to be completed.          Additional Information About Your Visit        [employer] Information     [employer] lets you send messages to your doctor, view your test results, renew your prescriptions, schedule appointments and more. To sign up, go to [email or web address] . Click on \"Log in\" on the left side of the screen, which will take you to the Welcome page. Then click on \"Sign up Now\" on the right side of the page.     You will be asked to enter the access code listed below, as well as some personal information. Please follow the directions to create your username and password.     Your access code is: [email or web address]  Expires: 1/15/2018  1:13 PM     Your access code will  in 90 days. If you need help or a new code, please call your [facility] clinic or [phone].        Care EveryWhere ID     This is your Care EveryWhere ID. This could be used by other organizations to access your [facility] medical records  [id number]        Your Vitals Were     Pulse Temperature Respirations BMI (Body Mass Index)       "    68 98.4  F (36.9  C) (Oral) 16 29.07 kg/m2         Blood Pressure from Last 3 Encounters:   12/11/17 136/65   12/07/17 136/67   12/04/17 140/78    Weight from Last 3 Encounters:   12/11/17 72.1 kg (159 lb)   12/07/17 74.1 kg (163 lb 6.4 oz)   12/04/17 71.8 kg (158 lb 3.2 oz)              We Performed the Following     CBC with platelets differential     Comprehensive metabolic panel     IgG     Kappa and lambda light chain     Protein electrophoresis          Today's Medication Changes          These changes are accurate as of: 12/11/17  2:24 PM.  If you have any questions, ask your nurse or doctor.               These medicines have changed or have updated prescriptions.        Dose/Directions    * dexamethasone 4 MG tablet   Commonly known as:  DECADRON   This may have changed:  Another medication with the same name was added. Make sure you understand how and when to take each.   Used for:  Multiple myeloma not having achieved remission (H)        Dose:  40 mg   Take 10 tablets (40 mg) by mouth every 7 days Days 1, 8, and 15.   Quantity:  30 tablet   Refills:  0       * dexamethasone 4 MG tablet   Commonly known as:  DECADRON   This may have changed:  Another medication with the same name was added. Make sure you understand how and when to take each.   Used for:  Multiple myeloma not having achieved remission (H)        Dose:  40 mg   Take 10 tablets (40 mg) by mouth every 7 days for 3 doses Days 1, 8, and 15.   Quantity:  30 tablet   Refills:  0       * dexamethasone 4 MG tablet   Commonly known as:  DECADRON   This may have changed:  You were already taking a medication with the same name, and this prescription was added. Make sure you understand how and when to take each.   Used for:  Multiple myeloma not having achieved remission (H)        Dose:  40 mg   Take 10 tablets (40 mg) by mouth every 7 days for 3 doses Days 1, 8, and 15.   Quantity:  30 tablet   Refills:  0       * LENalidomide 10 MG Caps capsule  CHEMO   Commonly known as:  REVLIMID   This may have changed:  Another medication with the same name was added. Make sure you understand how and when to take each.   Used for:  Multiple myeloma not having achieved remission (H)        Dose:  10 mg   Take 1 capsule (10 mg) by mouth daily for 14 days Days 1 through 14.   Quantity:  14 capsule   Refills:  0       * LENalidomide 10 MG Caps capsule CHEMO   Commonly known as:  REVLIMID   This may have changed:  You were already taking a medication with the same name, and this prescription was added. Make sure you understand how and when to take each.   Used for:  Multiple myeloma not having achieved remission (H)        Dose:  10 mg   Take 1 capsule (10 mg) by mouth daily for 14 days Days 1 through 14.   Quantity:  14 capsule   Refills:  0       * Notice:  This list has 5 medication(s) that are the same as other medications prescribed for you. Read the directions carefully, and ask your doctor or other care provider to review them with you.         Where to get your medicines      These medications were sent to [facility] - [address]  [address] [facility] [address]    Hours:  Mon-Fri 8:30am-5:00pm Toll Free [phone] Phone:  [phone]     dexamethasone 4 MG tablet    LENalidomide 10 MG Caps capsule CHEMO                Primary Care Provider Office Phone # Fax #    César Chung [phone]       [facility] [address] [place] [address]        Equal Access to Services     LILLIANA SANDOVAL AH: Hadii aad ku hadasho Soterrieali, waaxda luqadaha, qaybta kaalmada adeegyada, phoenix villeda. So [facility] [phone].    ATENCIÓN: Si habla español, tiene a contreras disposición servicios gratuitos de asistencia lingüística. Llame al [phone].    We comply with applicable federal civil rights laws and [place] laws. We do not discriminate on the basis  of race, color, national origin, age, disability, sex, sexual orientation, or gender identity.            Thank you!     Thank you for choosing [facility]  for your care. Our goal is always to provide you with excellent care. Hearing back from our patients is one way we can continue to improve our services. Please take a few minutes to complete the written survey that you may receive in the mail after your visit with us. Thank you!             Your Updated Medication List - Protect others around you: Learn how to safely use, store and throw away your medicines at www.disposemymeds.org.          This list is accurate as of: 12/11/17  2:24 PM.  Always use your most recent med list.                   Brand Name Dispense Instructions for use Diagnosis    ACYCLOVIR PO      Take 400 mg by mouth 2 times daily        aspirin 81 MG chewable tablet      Take 81 mg by mouth daily        calcium carbonate-vitamin D 600-400 MG-UNIT Chew     180 tablet    Take 1 chew tab by mouth 2 times daily    Multiple myeloma not having achieved remission (H)       * dexamethasone 4 MG tablet    DECADRON    30 tablet    Take 10 tablets (40 mg) by mouth every 7 days Days 1, 8, and 15.    Multiple myeloma not having achieved remission (H)       * dexamethasone 4 MG tablet    DECADRON    30 tablet    Take 10 tablets (40 mg) by mouth every 7 days for 3 doses Days 1, 8, and 15.    Multiple myeloma not having achieved remission (H)       * dexamethasone 4 MG tablet    DECADRON    30 tablet    Take 10 tablets (40 mg) by mouth every 7 days for 3 doses Days 1, 8, and 15.    Multiple myeloma not having achieved remission (H)       doxycycline 100 MG capsule    VIBRAMYCIN    20 capsule    Take 1 capsule (100 mg) by mouth 2 times daily    Acute bronchitis, unspecified organism       guaiFENesin-codeine 100-10 MG/5ML Soln solution    ROBITUSSIN AC    120 mL    Take 5 mLs by mouth every 6 hours as needed for cough    Acute  bronchitis, unspecified organism       * LENalidomide 10 MG Caps capsule CHEMO    REVLIMID    14 capsule    Take 1 capsule (10 mg) by mouth daily for 14 days Days 1 through 14.    Multiple myeloma not having achieved remission (H)       * LENalidomide 10 MG Caps capsule CHEMO    REVLIMID    14 capsule    Take 1 capsule (10 mg) by mouth daily for 14 days Days 1 through 14.    Multiple myeloma not having achieved remission (H)       lisinopril 10 MG tablet    PRINIVIL/ZESTRIL    90 tablet    Take 1 tablet (10 mg) by mouth daily    Benign essential hypertension       LORazepam 0.5 MG tablet    ATIVAN    10 tablet    Take 1 tablet (0.5 mg) by mouth every 8 hours as needed (Anxiety, Nausea/Vomiting or Sleep)    Multiple myeloma not having achieved remission (H)       nitroGLYcerin 0.4 MG sublingual tablet    NITROSTAT    25 tablet    For chest pain place 1 tablet under the tongue every 5 minutes for 3 doses. If symptoms persist 5 minutes after 1st dose call 911.    Atypical chest pain       * order for DME     1 Units    Dispense one 4 wheeled walker with hand brakes and seat    Multiple myeloma not having achieved remission (H)       * order for DME     1 Units    Wheelchair.    Need for assistance due to unsteady gait       prochlorperazine 10 MG tablet    COMPAZINE    30 tablet    Take 1 tablet (10 mg) by mouth every 6 hours as needed (Nausea/Vomiting)    Multiple myeloma not having achieved remission (H)       * Notice:  This list has 7 medication(s) that are the same as other medications prescribed for you. Read the directions carefully, and ask your doctor or other care provider to review them with you.

## 2017-12-11 NOTE — PROGRESS NOTES
Infusion Nursing Note:  Quiana Dunaway presents today for Velcade C6D1.    Patient seen by provider today: No   present during visit today: Not Applicable.    Note: Patient states she does not feel any better than she did last week. Is taking doxycycline for infection. Patient also states she has decreased appetite.  Spoke with Dr. Parry about these concerns. Per MD okay to proceed with treatment today.    Intravenous Access:  Lab draw site Right AC, Needle type Butterfly, Gauge 23.    Treatment Conditions:  Lab Results   Component Value Date    HGB 12.5 12/11/2017     Lab Results   Component Value Date    WBC 5.9 12/11/2017      Lab Results   Component Value Date    ANEU 3.5 12/11/2017     Lab Results   Component Value Date     12/11/2017      Lab Results   Component Value Date     12/11/2017                   Lab Results   Component Value Date    POTASSIUM 3.0 12/11/2017           No results found for: MAG         Lab Results   Component Value Date    CR 0.56 12/11/2017                   Lab Results   Component Value Date    HUMBERTO 8.7 12/11/2017                Lab Results   Component Value Date    BILITOTAL 0.5 12/11/2017           Lab Results   Component Value Date    ALBUMIN 3.2 12/11/2017                    Lab Results   Component Value Date    ALT 16 12/11/2017           Lab Results   Component Value Date    AST 16 12/11/2017     Results reviewed, labs MET treatment parameters, ok to proceed with treatment.  Potassium replaced orally today.    Post Infusion Assessment:  Patient tolerated injection without incident.  Site patent and intact, free from redness, edema or discomfort.  No evidence of extravasations.    Discharge Plan:   Discharge instructions reviewed with: Patient and Family.  Patient and/or family verbalized understanding of discharge instructions and all questions answered.  Copy of AVS reviewed with patient and/or family.  Patient will return 12/18/2017 for next  appointment.  Patient discharged in stable condition accompanied by: self and daughter-in-law.  Departure Mode: Ambulatory.    Ammy Thomas RN

## 2017-12-12 LAB
ALBUMIN SERPL ELPH-MCNC: 3.6 G/DL (ref 3.7–5.1)
ALPHA1 GLOB SERPL ELPH-MCNC: 0.4 G/DL (ref 0.2–0.4)
ALPHA2 GLOB SERPL ELPH-MCNC: 0.9 G/DL (ref 0.5–0.9)
B-GLOBULIN SERPL ELPH-MCNC: 0.7 G/DL (ref 0.6–1)
GAMMA GLOB SERPL ELPH-MCNC: 0.6 G/DL (ref 0.7–1.6)
IGG SERPL-MCNC: 617 MG/DL (ref 695–1620)
M PROTEIN SERPL ELPH-MCNC: 0.3 G/DL
PROT PATTERN SERPL ELPH-IMP: ABNORMAL

## 2017-12-14 ENCOUNTER — DOCUMENTATION ONLY (OUTPATIENT)
Dept: CARDIOLOGY | Facility: CLINIC | Age: 82
End: 2017-12-14

## 2017-12-14 NOTE — PATIENT INSTRUCTIONS
Patient missed [employer] transmission on 12/6/17. Sent letter 12/7, no response. Sent 1 week letter today

## 2017-12-18 ENCOUNTER — INFUSION THERAPY VISIT (OUTPATIENT)
Dept: INFUSION THERAPY | Facility: CLINIC | Age: 82
End: 2017-12-18
Attending: INTERNAL MEDICINE
Payer: MEDICARE

## 2017-12-18 ENCOUNTER — HOSPITAL ENCOUNTER (OUTPATIENT)
Facility: CLINIC | Age: 82
Setting detail: SPECIMEN
Discharge: HOME | End: 2017-12-18
Attending: INTERNAL MEDICINE | Admitting: INTERNAL MEDICINE
Payer: MEDICARE

## 2017-12-18 VITALS
BODY MASS INDEX: 30.11 KG/M2 | WEIGHT: 163.6 LBS | SYSTOLIC BLOOD PRESSURE: 143 MMHG | HEART RATE: 87 BPM | HEIGHT: 62 IN | RESPIRATION RATE: 18 BRPM | OXYGEN SATURATION: 95 % | TEMPERATURE: 98.6 F | DIASTOLIC BLOOD PRESSURE: 74 MMHG

## 2017-12-18 DIAGNOSIS — C90.00 MULTIPLE MYELOMA NOT HAVING ACHIEVED REMISSION (H): Primary | ICD-10-CM

## 2017-12-18 LAB
BASOPHILS # BLD AUTO: 0 10E9/L (ref 0–0.2)
BASOPHILS NFR BLD AUTO: 0.2 %
DIFFERENTIAL METHOD BLD: ABNORMAL
EOSINOPHIL # BLD AUTO: 0.2 10E9/L (ref 0–0.7)
EOSINOPHIL NFR BLD AUTO: 3.7 %
ERYTHROCYTE [DISTWIDTH] IN BLOOD BY AUTOMATED COUNT: 14.6 % (ref 10–15)
HCT VFR BLD AUTO: 35.2 % (ref 35–47)
HGB BLD-MCNC: 11.6 G/DL (ref 11.7–15.7)
IMM GRANULOCYTES # BLD: 0.1 10E9/L (ref 0–0.4)
IMM GRANULOCYTES NFR BLD: 1 %
LYMPHOCYTES # BLD AUTO: 1.8 10E9/L (ref 0.8–5.3)
LYMPHOCYTES NFR BLD AUTO: 29.1 %
MCH RBC QN AUTO: 28.4 PG (ref 26.5–33)
MCHC RBC AUTO-ENTMCNC: 33 G/DL (ref 31.5–36.5)
MCV RBC AUTO: 86 FL (ref 78–100)
MONOCYTES # BLD AUTO: 0.5 10E9/L (ref 0–1.3)
MONOCYTES NFR BLD AUTO: 8.5 %
NEUTROPHILS # BLD AUTO: 3.5 10E9/L (ref 1.6–8.3)
NEUTROPHILS NFR BLD AUTO: 57.5 %
NRBC # BLD AUTO: 0 10*3/UL
NRBC BLD AUTO-RTO: 0 /100
PLATELET # BLD AUTO: 212 10E9/L (ref 150–450)
RBC # BLD AUTO: 4.08 10E12/L (ref 3.8–5.2)
WBC # BLD AUTO: 6 10E9/L (ref 4–11)

## 2017-12-18 PROCEDURE — 96374 THER/PROPH/DIAG INJ IV PUSH: CPT

## 2017-12-18 PROCEDURE — 85025 COMPLETE CBC W/AUTO DIFF WBC: CPT | Performed by: INTERNAL MEDICINE

## 2017-12-18 PROCEDURE — 25000128 H RX IP 250 OP 636: Performed by: INTERNAL MEDICINE

## 2017-12-18 PROCEDURE — 96401 CHEMO ANTI-NEOPL SQ/IM: CPT

## 2017-12-18 RX ORDER — ZOLEDRONIC ACID 0.04 MG/ML
4 INJECTION, SOLUTION INTRAVENOUS ONCE
Status: COMPLETED | OUTPATIENT
Start: 2017-12-18 | End: 2017-12-18

## 2017-12-18 RX ADMIN — BORTEZOMIB 2.7 MG: 3.5 INJECTION, POWDER, LYOPHILIZED, FOR SOLUTION INTRAVENOUS; SUBCUTANEOUS at 15:29

## 2017-12-18 RX ADMIN — ZOLEDRONIC ACID 4 MG: 0.04 INJECTION, SOLUTION INTRAVENOUS at 15:16

## 2017-12-18 ASSESSMENT — PAIN SCALES - GENERAL: PAINLEVEL: NO PAIN (0)

## 2017-12-18 NOTE — PROGRESS NOTES
Infusion Nursing Note:  Quiana Dunaway presents today for C6 D8 Velcade, Zometa.    Patient seen by provider today: No   present during visit today: Not Applicable.    Note: Per Debra Matos [facility], ok to use labs from 12/11 for zometa today. Patient is two weeks late due to delay in treatment, but patient is to continue per Dr. Parry's note from 12/4/17. Dental clearance noted in chart from 11/6/17. Patient verified she is taking calcium and vitamin D as recommended. Patient verbalizes understanding of correct dosing/schedule of oral dexamethasone and revlimid as ordered, denies need for refill at this time.      Intravenous Access:  Lab draw site left hand, Needle type butterfly, Gauge 23.  Labs drawn without difficulty.  Peripheral IV placed.    Treatment Conditions:  Lab Results   Component Value Date    HGB 11.6 12/18/2017     Lab Results   Component Value Date    WBC 6.0 12/18/2017      Lab Results   Component Value Date    ANEU 3.5 12/18/2017     Lab Results   Component Value Date     12/18/2017      Lab Results   Component Value Date     12/11/2017                   Lab Results   Component Value Date    POTASSIUM 3.0 12/11/2017              Lab Results   Component Value Date    CR 0.56 12/11/2017                   Lab Results   Component Value Date    HUMBERTO  Corrected calcium 8.7  9.34 12/11/2017 12/11/2017                Lab Results   Component Value Date    BILITOTAL 0.5 12/11/2017           Lab Results   Component Value Date    ALBUMIN 3.2 12/11/2017                    Lab Results   Component Value Date    ALT 16 12/11/2017           Lab Results   Component Value Date    AST 16 12/11/2017     Results reviewed, labs MET treatment parameters, ok to proceed with treatment.      Post Infusion Assessment:  Patient tolerated infusion without incident.  Patient tolerated injection without incident. Velcade given SQ in right mid abdomen.   Blood return noted pre and post infusion.  Site  patent and intact, free from redness, edema or discomfort.  No evidence of extravasations.  Access discontinued per protocol.    Discharge Plan:   Discharge instructions reviewed with: Patient.  Patient and/or family verbalized understanding of discharge instructions and all questions answered.  Copy of AVS given to patient.  Patient will return 1/2/18 for next appointment.   Patient discharged in stable condition accompanied by: daughter.  Departure Mode: Ambulatory.  Face to Face time: 10 minutes.    Amanda Howe RN

## 2017-12-18 NOTE — MR AVS SNAPSHOT
After Visit Summary   12/18/2017    Quiana Dunaway    MRN: [id number]           Patient Information     Date Of Birth          1935        Visit Information        Provider Department      12/18/2017 2:00 PM  INFUSION CHAIR 13 [facility]        Today's Diagnoses     Multiple myeloma not having achieved remission (H)    -  1       Follow-ups after your visit        Your next 10 appointments already scheduled     Jan 02, 2018  1:30 PM CST   Return Visit with  Oncology Nurse   [facility] ([facility])    [place] [id number]  [place] [address]   [phone]            Jan 02, 2018  2:00 PM CST   Return Visit with Alex Parry MD   [facility] ([facility])    [place] [id number]  [place] [address]   [phone]            Jan 02, 2018  2:30 PM CST   Level 2 with  INFUSION CHAIR 5   [facility] ([facility])    [facility] [place] [id number]  [place] [address]   [phone]            Jan 08, 2018  2:00 PM CST   Level 2 with  INFUSION CHAIR 5   [facility] ([facility])    [facility]  [address]  [place] [address]   [phone]            Guru 15, 2018  2:00 PM CST   Level 2 with  INFUSION CHAIR 5   [facility] ([facility])    [facility]  [address]  [place] [address]   [phone]            Jan 22, 2018  2:00 PM CST   Level 2 with  INFUSION CHAIR 18   [facility] ([facility])    [facility]  [address]  [place] [address]   [phone]            Jan 29, 2018  2:00 PM CST   Level 2 with  " INFUSION CHAIR 5   [facility] ([facility])    [facility] [place]  [address]  [place] [address] [phone]            2018  2:00 PM CST   Level 2 with  INFUSION CHAIR 4   [facility] ([facility])    [facility] [place]  [address]  [place] [address] [phone]              Who to contact     If you have questions or need follow up information about today's clinic visit or your schedule please contact [facility] directly at [phone].  Normal or non-critical lab and imaging results will be communicated to you by [employer], letter or phone within 4 business days after the clinic has received the results. If you do not hear from us within 7 days, please contact the clinic through [employer] or phone. If you have a critical or abnormal lab result, we will notify you by phone as soon as possible.  Submit refill requests through [employer] or call your pharmacy and they will forward the refill request to us. Please allow 3 business days for your refill to be completed.          Additional Information About Your Visit        [email or web address] Information     [employer] lets you send messages to your doctor, view your test results, renew your prescriptions, schedule appointments and more. To sign up, go to [email or web address] . Click on \"Log in\" on the left side of the screen, which will take you to the Welcome page. Then click on \"Sign up Now\" on the right side of the page.     You will be asked to enter the access code listed below, as well as some personal information. Please follow the directions to create your username and password.     Your access code is: [email or web address]  Expires: 1/15/2018  1:13 PM     Your access code will  in 90 days. If you need help or a new code, please call your [place] clinic or [phone].   " "     Care EveryWhere ID     This is your Care EveryWhere ID. This could be used by other organizations to access your [facility] medical records  [id number]        Your Vitals Were     Pulse Temperature Respirations Height Pulse Oximetry BMI (Body Mass Index)    87 98.6  F (37  C) (Oral) 18 1.575 m (5' 2.01\") 95% 29.92 kg/m2       Blood Pressure from Last 3 Encounters:   12/18/17 143/74   12/11/17 136/65   12/07/17 136/67    Weight from Last 3 Encounters:   12/18/17 74.2 kg (163 lb 9.6 oz)   12/11/17 72.1 kg (159 lb)   12/07/17 74.1 kg (163 lb 6.4 oz)              We Performed the Following     CBC with platelets differential        Primary Care Provider Office Phone # Fax #    César Chung [phone]       [facility] [address] [facility] [address]  [facility] [address]        Equal Access to Services     [facility] : Hadii aad ku hadasho Soomaali, waaxda luqadaha, qaybta kaalmada adeegyada, waxay idiin haydiana rodriguez . So [facility] [phone].    ATENCIÓN: Si habla español, tiene a contreras disposición servicios gratuitos de asistencia lingüística. [facility] [phone].    We comply with applicable federal civil rights laws and [place] laws. We do not discriminate on the basis of race, color, national origin, age, disability, sex, sexual orientation, or gender identity.            Thank you!     Thank you for choosing [facility]  for your care. Our goal is always to provide you with excellent care. Hearing back from our patients is one way we can continue to improve our services. Please take a few minutes to complete the written survey that you may receive in the mail after your visit with us. Thank you!             Your Updated Medication List - Protect others around you: Learn how to safely use, store and throw away your medicines at www.disposemymeds.org.          This list is accurate as of: 12/18/17  3:50 PM.  Always use your most recent med " list.                   Brand Name Dispense Instructions for use Diagnosis    ACYCLOVIR PO      Take 400 mg by mouth 2 times daily        aspirin 81 MG chewable tablet      Take 81 mg by mouth daily        calcium carbonate-vitamin D 600-400 MG-UNIT Chew     180 tablet    Take 1 chew tab by mouth 2 times daily    Multiple myeloma not having achieved remission (H)       * dexamethasone 4 MG tablet    DECADRON    30 tablet    Take 10 tablets (40 mg) by mouth every 7 days Days 1, 8, and 15.    Multiple myeloma not having achieved remission (H)       * dexamethasone 4 MG tablet    DECADRON    30 tablet    Take 10 tablets (40 mg) by mouth every 7 days for 3 doses Days 1, 8, and 15.    Multiple myeloma not having achieved remission (H)       LENalidomide 10 MG Caps capsule CHEMO    REVLIMID    14 capsule    Take 1 capsule (10 mg) by mouth daily for 14 days Days 1 through 14.    Multiple myeloma not having achieved remission (H)       lisinopril 10 MG tablet    PRINIVIL/ZESTRIL    90 tablet    Take 1 tablet (10 mg) by mouth daily    Benign essential hypertension       LORazepam 0.5 MG tablet    ATIVAN    10 tablet    Take 1 tablet (0.5 mg) by mouth every 8 hours as needed (Anxiety, Nausea/Vomiting or Sleep)    Multiple myeloma not having achieved remission (H)       nitroGLYcerin 0.4 MG sublingual tablet    NITROSTAT    25 tablet    For chest pain place 1 tablet under the tongue every 5 minutes for 3 doses. If symptoms persist 5 minutes after 1st dose call 911.    Atypical chest pain       * order for DME     1 Units    Dispense one 4 wheeled walker with hand brakes and seat    Multiple myeloma not having achieved remission (H)       * order for DME     1 Units    Wheelchair.    Need for assistance due to unsteady gait       prochlorperazine 10 MG tablet    COMPAZINE    30 tablet    Take 1 tablet (10 mg) by mouth every 6 hours as needed (Nausea/Vomiting)    Multiple myeloma not having achieved remission (H)       * Notice:   This list has 4 medication(s) that are the same as other medications prescribed for you. Read the directions carefully, and ask your doctor or other care provider to review them with you.

## 2017-12-19 ENCOUNTER — TELEPHONE (OUTPATIENT)
Dept: FAMILY MEDICINE | Facility: CLINIC | Age: 82
End: 2017-12-19

## 2017-12-19 NOTE — TELEPHONE ENCOUNTER
Reason for Call:  Other call back    Detailed comments: The patient is just getting over what they think is Pneumonia  They want to talk over her medications    Phone Number Patient can be reached at: Braxton Boland [phone]    Best Time: anytime    Can we leave a detailed message on this number? YES    Call taken on 12/19/2017 at 1:16 PM by Alejandra Cast

## 2017-12-19 NOTE — TELEPHONE ENCOUNTER
She coughed for 3 weeks and she saw Jael- chest xray normal.  Antibiotic took 4 days for it to start working.   She is upset that we sent a letter and it went to wrong address telling her that the Radiologist read the xray as pneumonia.  I double checked address and we have the right one on file.  I apologized and apologized and listened and listened.     No further needs  Carlyn Gerber RN- Triage [employer]

## 2017-12-22 ENCOUNTER — TELEPHONE (OUTPATIENT)
Dept: ONCOLOGY | Facility: CLINIC | Age: 82
End: 2017-12-22

## 2017-12-22 NOTE — TELEPHONE ENCOUNTER
Patient requested on 12/4 office visit to have 1 week off of treatment over the holidays. DR. Parry approved this and 12/26 appointment has been cancelled per request of the daughter.  Alecia Briggs

## 2017-12-26 DIAGNOSIS — C90.00 MULTIPLE MYELOMA NOT HAVING ACHIEVED REMISSION (H): Primary | ICD-10-CM

## 2017-12-26 RX ORDER — ALBUTEROL SULFATE 90 UG/1
1-2 AEROSOL, METERED RESPIRATORY (INHALATION)
Status: CANCELLED
Start: 2018-01-02

## 2017-12-26 RX ORDER — DIPHENHYDRAMINE HYDROCHLORIDE 50 MG/ML
50 INJECTION INTRAMUSCULAR; INTRAVENOUS
Status: CANCELLED
Start: 2018-01-02

## 2017-12-26 RX ORDER — ALBUTEROL SULFATE 90 UG/1
1-2 AEROSOL, METERED RESPIRATORY (INHALATION)
Status: CANCELLED
Start: 2018-01-15

## 2017-12-26 RX ORDER — EPINEPHRINE 0.3 MG/.3ML
0.3 INJECTION SUBCUTANEOUS EVERY 5 MIN PRN
Status: CANCELLED | OUTPATIENT
Start: 2018-01-02

## 2017-12-26 RX ORDER — LORAZEPAM 2 MG/ML
0.5 INJECTION INTRAMUSCULAR EVERY 4 HOURS PRN
Status: CANCELLED
Start: 2018-01-02

## 2017-12-26 RX ORDER — EPINEPHRINE 1 MG/ML
0.3 INJECTION, SOLUTION INTRAMUSCULAR; SUBCUTANEOUS EVERY 5 MIN PRN
Status: CANCELLED | OUTPATIENT
Start: 2018-01-02

## 2017-12-26 RX ORDER — METHYLPREDNISOLONE SODIUM SUCCINATE 125 MG/2ML
125 INJECTION, POWDER, LYOPHILIZED, FOR SOLUTION INTRAMUSCULAR; INTRAVENOUS
Status: CANCELLED
Start: 2018-01-15

## 2017-12-26 RX ORDER — EPINEPHRINE 0.3 MG/.3ML
0.3 INJECTION SUBCUTANEOUS EVERY 5 MIN PRN
Status: CANCELLED | OUTPATIENT
Start: 2018-01-15

## 2017-12-26 RX ORDER — MEPERIDINE HYDROCHLORIDE 25 MG/ML
25 INJECTION INTRAMUSCULAR; INTRAVENOUS; SUBCUTANEOUS EVERY 30 MIN PRN
Status: CANCELLED | OUTPATIENT
Start: 2018-01-02

## 2017-12-26 RX ORDER — EPINEPHRINE 1 MG/ML
0.3 INJECTION, SOLUTION INTRAMUSCULAR; SUBCUTANEOUS EVERY 5 MIN PRN
Status: CANCELLED | OUTPATIENT
Start: 2018-01-15

## 2017-12-26 RX ORDER — DIPHENHYDRAMINE HYDROCHLORIDE 50 MG/ML
50 INJECTION INTRAMUSCULAR; INTRAVENOUS
Status: CANCELLED
Start: 2018-01-15

## 2017-12-26 RX ORDER — MEPERIDINE HYDROCHLORIDE 25 MG/ML
25 INJECTION INTRAMUSCULAR; INTRAVENOUS; SUBCUTANEOUS EVERY 30 MIN PRN
Status: CANCELLED | OUTPATIENT
Start: 2018-01-15

## 2017-12-26 RX ORDER — ALBUTEROL SULFATE 0.83 MG/ML
2.5 SOLUTION RESPIRATORY (INHALATION)
Status: CANCELLED | OUTPATIENT
Start: 2018-01-02

## 2017-12-26 RX ORDER — LORAZEPAM 2 MG/ML
0.5 INJECTION INTRAMUSCULAR EVERY 4 HOURS PRN
Status: CANCELLED
Start: 2018-01-15

## 2017-12-26 RX ORDER — METHYLPREDNISOLONE SODIUM SUCCINATE 125 MG/2ML
125 INJECTION, POWDER, LYOPHILIZED, FOR SOLUTION INTRAMUSCULAR; INTRAVENOUS
Status: CANCELLED
Start: 2018-01-02

## 2017-12-26 RX ORDER — SODIUM CHLORIDE 9 MG/ML
1000 INJECTION, SOLUTION INTRAVENOUS CONTINUOUS PRN
Status: CANCELLED
Start: 2018-01-02

## 2017-12-26 RX ORDER — SODIUM CHLORIDE 9 MG/ML
1000 INJECTION, SOLUTION INTRAVENOUS CONTINUOUS PRN
Status: CANCELLED
Start: 2018-01-15

## 2017-12-26 RX ORDER — ALBUTEROL SULFATE 0.83 MG/ML
2.5 SOLUTION RESPIRATORY (INHALATION)
Status: CANCELLED | OUTPATIENT
Start: 2018-01-15

## 2017-12-27 RX ORDER — LENALIDOMIDE 10 MG/1
10 CAPSULE ORAL DAILY
Qty: 14 CAPSULE | Refills: 0 | Status: SHIPPED | OUTPATIENT
Start: 2017-12-27 | End: 2018-12-26

## 2017-12-27 RX ORDER — DEXAMETHASONE 4 MG/1
40 TABLET ORAL
Qty: 30 TABLET | Refills: 0 | Status: SHIPPED | OUTPATIENT
Start: 2017-12-27 | End: 2018-01-11

## 2017-12-28 ENCOUNTER — TELEPHONE (OUTPATIENT)
Dept: ONCOLOGY | Facility: CLINIC | Age: 82
End: 2017-12-28

## 2017-12-28 NOTE — TELEPHONE ENCOUNTER
Regi (daughter) called [phone] concerned that her mother has been having tooth pain for the last week or so and the dentist will not look at her teeth until we give the ok. She is scheduled for dental exam tomorrow. I provided the ok for the exam and that our office needs to be notified prior to any procedure that maybe indicated.    I also called and spoke with patient and was informed the tooth pain has been lasting a week and she has had multiple teeth extracted and the dentist will not look . I approved the exam and advised that the dental office call if a procedure needs to be completed. Alecia Briggs

## 2017-12-31 ENCOUNTER — ALLIED HEALTH/NURSE VISIT (OUTPATIENT)
Dept: CARDIOLOGY | Facility: CLINIC | Age: 82
End: 2017-12-31
Payer: MEDICARE

## 2017-12-31 DIAGNOSIS — Z95.0 CARDIAC PACEMAKER IN SITU: Primary | ICD-10-CM

## 2017-12-31 PROCEDURE — 93296 REM INTERROG EVL PM/IDS: CPT | Performed by: INTERNAL MEDICINE

## 2017-12-31 PROCEDURE — 93294 REM INTERROG EVL PM/LDLS PM: CPT | Performed by: INTERNAL MEDICINE

## 2017-12-31 NOTE — MR AVS SNAPSHOT
After Visit Summary   12/31/2017    Quiana Dunaway    MRN: [id number]           Patient Information     Date Of Birth          1935        Visit Information        Provider Department      12/31/2017 4:00 PM RIOS TECH1 [facility]   [place]        Today's Diagnoses     Cardiac pacemaker in situ    -  1       Follow-ups after your visit        Your next 10 appointments already scheduled     Jan 02, 2018  1:30 PM CST   Return Visit with  Oncology Nurse   [facility] ([facility])    [facility]  [address]  [place] [address]   [phone]            Jan 02, 2018  2:00 PM CST   Return Visit with Alex Parry MD   [facility] ([facility])    [facility] [place] [id number]  [place] [address]   [phone]            Jan 02, 2018  2:30 PM CST   Level 2 with  INFUSION CHAIR 5   [facility] ([facility])    [facility] [place]  [address]  [place] [address]   [phone]            Jan 08, 2018  2:00 PM CST   Level 2 with  INFUSION CHAIR 5   [facility] ([facility])    [facility]  [address]  [place] [address]   [phone]            Guru 15, 2018  2:00 PM CST   Level 2 with  INFUSION CHAIR 5   [facility] ([facility])    [facility]  [address]  [place] [address]   [phone]            Jan 22, 2018  2:00 PM CST   Level 2 with  INFUSION CHAIR 18   [facility] ([facility])    [facility]  [address]  [place] [address]   [phone]            Jan 29, 2018  2:00 PM CST   Level 2 with  INFUSION CHAIR 5  "  [facility] ([facility])    [facility]  [address]  [place] [address] [phone]            2018  2:00 PM CST   Level 2 with SH INFUSION CHAIR 4   [facility] ([facility])    [facility] [place]  [address]  [place] [address] [phone]              Who to contact     If you have questions or need follow up information about today's clinic visit or your schedule please contact [facility] directly at [phone].  Normal or non-critical lab and imaging results will be communicated to you by [employer], letter or phone within 4 business days after the clinic has received the results. If you do not hear from us within 7 days, please contact the clinic through [employer] or phone. If you have a critical or abnormal lab result, we will notify you by phone as soon as possible.  Submit refill requests through [employer] or call your pharmacy and they will forward the refill request to us. Please allow 3 business days for your refill to be completed.          Additional Information About Your Visit        [employer] Information     [employer] lets you send messages to your doctor, view your test results, renew your prescriptions, schedule appointments and more. To sign up, go to [email or web address] . Click on \"Log in\" on the left side of the screen, which will take you to the Welcome page. Then click on \"Sign up Now\" on the right side of the page.     You will be asked to enter the access code listed below, as well as some personal information. Please follow the directions to create your username and password.     Your access code is: [email or web address]  Expires: 1/15/2018  1:13 PM     Your access code will  in 90 days. If you need help or a new code, please call your [place] clinic or [phone].        Care " EveryWhere ID     This is your Care EveryWhere ID. This could be used by other organizations to access your [facility] medical records  [id number]         Blood Pressure from Last 3 Encounters:   12/18/17 143/74   12/11/17 136/65   12/07/17 136/67    Weight from Last 3 Encounters:   12/18/17 74.2 kg (163 lb 9.6 oz)   12/11/17 72.1 kg (159 lb)   12/07/17 74.1 kg (163 lb 6.4 oz)              We Performed the Following     INTERROGATION DEVICE EVAL REMOTE, PACER/ICD (11755)     PM DEVICE INTERROGATE REMOTE (43659)        Primary Care Provider Office Phone # Fax #    César Chung [phone]       [place] [address] [address]        Equal Access to Services     [facility] : Hadii aad ku hadasho Soomaali, waaxda luqadaha, qaybta kaalmada adeegyada, waxay idiin hayaan carlos rodriguez . So [facility] [phone].    ATENCIÓN: Si habla español, tiene a contreras disposición servicios gratuitos de asistencia lingüística. Llame al [phone].    We comply with applicable federal civil rights laws and [place] laws. We do not discriminate on the basis of race, color, national origin, age, disability, sex, sexual orientation, or gender identity.            Thank you!     Thank you for choosing [facility]  for your care. Our goal is always to provide you with excellent care. Hearing back from our patients is one way we can continue to improve our services. Please take a few minutes to complete the written survey that you may receive in the mail after your visit with us. Thank you!             Your Updated Medication List - Protect others around you: Learn how to safely use, store and throw away your medicines at www.disposemymeds.org.          This list is accurate as of: 12/31/17 11:59 PM.  Always use your most recent med list.                   Brand Name Dispense Instructions for use Diagnosis    ACYCLOVIR PO      Take 400 mg by  mouth 2 times daily        aspirin 81 MG chewable tablet      Take 81 mg by mouth daily        calcium carbonate-vitamin D 600-400 MG-UNIT Chew     180 tablet    Take 1 chew tab by mouth 2 times daily    Multiple myeloma not having achieved remission (H)       * dexamethasone 4 MG tablet    DECADRON    30 tablet    Take 10 tablets (40 mg) by mouth every 7 days Days 1, 8, and 15.    Multiple myeloma not having achieved remission (H)       * dexamethasone 4 MG tablet    DECADRON    30 tablet    Take 10 tablets (40 mg) by mouth every 7 days for 3 doses Days 1, 8, and 15.    Multiple myeloma not having achieved remission (H)       escitalopram 5 MG tablet    LEXAPRO    90 tablet    TAKE 1 TABLET BY MOUTH DAILY    ROBER (generalized anxiety disorder)       * LENalidomide 10 MG Caps capsule CHEMO    REVLIMID    14 capsule    Take 1 capsule (10 mg) by mouth daily for 14 days Days 1 through 14.    Multiple myeloma not having achieved remission (H)       * LENalidomide 10 MG Caps capsule CHEMO    REVLIMID    14 capsule    Take 1 capsule (10 mg) by mouth daily for 14 days Days 1 through 14.    Multiple myeloma not having achieved remission (H)       lisinopril 10 MG tablet    PRINIVIL/ZESTRIL    90 tablet    Take 1 tablet (10 mg) by mouth daily    Benign essential hypertension       LORazepam 0.5 MG tablet    ATIVAN    10 tablet    Take 1 tablet (0.5 mg) by mouth every 8 hours as needed (Anxiety, Nausea/Vomiting or Sleep)    Multiple myeloma not having achieved remission (H)       nitroGLYcerin 0.4 MG sublingual tablet    NITROSTAT    25 tablet    For chest pain place 1 tablet under the tongue every 5 minutes for 3 doses. If symptoms persist 5 minutes after 1st dose call 911.    Atypical chest pain       * order for DME     1 Units    Dispense one 4 wheeled walker with hand brakes and seat    Multiple myeloma not having achieved remission (H)       * order for DME     1 Units    Wheelchair.    Need for assistance due to  unsteady gait       prochlorperazine 10 MG tablet    COMPAZINE    30 tablet    Take 1 tablet (10 mg) by mouth every 6 hours as needed (Nausea/Vomiting)    Multiple myeloma not having achieved remission (H)       * Notice:  This list has 6 medication(s) that are the same as other medications prescribed for you. Read the directions carefully, and ask your doctor or other care provider to review them with you.

## 2018-01-02 ENCOUNTER — INFUSION THERAPY VISIT (OUTPATIENT)
Dept: INFUSION THERAPY | Facility: CLINIC | Age: 83
End: 2018-01-02
Attending: INTERNAL MEDICINE
Payer: MEDICARE

## 2018-01-02 ENCOUNTER — ONCOLOGY VISIT (OUTPATIENT)
Dept: ONCOLOGY | Facility: CLINIC | Age: 83
End: 2018-01-02
Attending: INTERNAL MEDICINE
Payer: MEDICARE

## 2018-01-02 ENCOUNTER — HOSPITAL ENCOUNTER (OUTPATIENT)
Facility: CLINIC | Age: 83
Setting detail: SPECIMEN
Discharge: HOME | End: 2018-01-02
Attending: INTERNAL MEDICINE | Admitting: INTERNAL MEDICINE
Payer: MEDICARE

## 2018-01-02 ENCOUNTER — DOCUMENTATION ONLY (OUTPATIENT)
Dept: PHARMACY | Facility: CLINIC | Age: 83
End: 2018-01-02

## 2018-01-02 VITALS
WEIGHT: 162.2 LBS | BODY MASS INDEX: 29.66 KG/M2 | SYSTOLIC BLOOD PRESSURE: 144 MMHG | RESPIRATION RATE: 16 BRPM | HEART RATE: 82 BPM | DIASTOLIC BLOOD PRESSURE: 78 MMHG | OXYGEN SATURATION: 96 % | TEMPERATURE: 98.5 F

## 2018-01-02 DIAGNOSIS — C90.00 MULTIPLE MYELOMA NOT HAVING ACHIEVED REMISSION (H): Primary | ICD-10-CM

## 2018-01-02 LAB
ALBUMIN SERPL-MCNC: 3.5 G/DL (ref 3.4–5)
ALP SERPL-CCNC: 89 U/L (ref 40–150)
ALT SERPL W P-5'-P-CCNC: 21 U/L (ref 0–50)
ANION GAP SERPL CALCULATED.3IONS-SCNC: 7 MMOL/L (ref 3–14)
AST SERPL W P-5'-P-CCNC: 13 U/L (ref 0–45)
BASOPHILS # BLD AUTO: 0 10E9/L (ref 0–0.2)
BASOPHILS NFR BLD AUTO: 0.4 %
BILIRUB SERPL-MCNC: 0.5 MG/DL (ref 0.2–1.3)
BUN SERPL-MCNC: 20 MG/DL (ref 7–30)
CALCIUM SERPL-MCNC: 9 MG/DL (ref 8.5–10.1)
CHLORIDE SERPL-SCNC: 106 MMOL/L (ref 94–109)
CO2 SERPL-SCNC: 29 MMOL/L (ref 20–32)
CREAT SERPL-MCNC: 0.59 MG/DL (ref 0.52–1.04)
DIFFERENTIAL METHOD BLD: NORMAL
EOSINOPHIL # BLD AUTO: 0.2 10E9/L (ref 0–0.7)
EOSINOPHIL NFR BLD AUTO: 4.1 %
ERYTHROCYTE [DISTWIDTH] IN BLOOD BY AUTOMATED COUNT: 14.9 % (ref 10–15)
GFR SERPL CREATININE-BSD FRML MDRD: >90 ML/MIN/1.7M2
GLUCOSE SERPL-MCNC: 102 MG/DL (ref 70–99)
HCT VFR BLD AUTO: 38.7 % (ref 35–47)
HGB BLD-MCNC: 12.6 G/DL (ref 11.7–15.7)
IMM GRANULOCYTES # BLD: 0 10E9/L (ref 0–0.4)
IMM GRANULOCYTES NFR BLD: 0.2 %
LYMPHOCYTES # BLD AUTO: 1.4 10E9/L (ref 0.8–5.3)
LYMPHOCYTES NFR BLD AUTO: 26.9 %
MCH RBC QN AUTO: 27.8 PG (ref 26.5–33)
MCHC RBC AUTO-ENTMCNC: 32.6 G/DL (ref 31.5–36.5)
MCV RBC AUTO: 85 FL (ref 78–100)
MONOCYTES # BLD AUTO: 1 10E9/L (ref 0–1.3)
MONOCYTES NFR BLD AUTO: 19.6 %
NEUTROPHILS # BLD AUTO: 2.5 10E9/L (ref 1.6–8.3)
NEUTROPHILS NFR BLD AUTO: 48.8 %
PLATELET # BLD AUTO: 216 10E9/L (ref 150–450)
POTASSIUM SERPL-SCNC: 3.4 MMOL/L (ref 3.4–5.3)
PROT SERPL-MCNC: 6.9 G/DL (ref 6.8–8.8)
RBC # BLD AUTO: 4.53 10E12/L (ref 3.8–5.2)
SODIUM SERPL-SCNC: 142 MMOL/L (ref 133–144)
WBC # BLD AUTO: 5.2 10E9/L (ref 4–11)

## 2018-01-02 PROCEDURE — 99214 OFFICE O/P EST MOD 30 MIN: CPT | Performed by: INTERNAL MEDICINE

## 2018-01-02 PROCEDURE — 85025 COMPLETE CBC W/AUTO DIFF WBC: CPT | Performed by: INTERNAL MEDICINE

## 2018-01-02 PROCEDURE — 36415 COLL VENOUS BLD VENIPUNCTURE: CPT

## 2018-01-02 PROCEDURE — 96401 CHEMO ANTI-NEOPL SQ/IM: CPT

## 2018-01-02 PROCEDURE — G0463 HOSPITAL OUTPT CLINIC VISIT: HCPCS | Mod: 25

## 2018-01-02 PROCEDURE — 25000128 H RX IP 250 OP 636: Performed by: INTERNAL MEDICINE

## 2018-01-02 PROCEDURE — 80053 COMPREHEN METABOLIC PANEL: CPT | Performed by: INTERNAL MEDICINE

## 2018-01-02 RX ORDER — ZOLEDRONIC ACID 0.04 MG/ML
4 INJECTION, SOLUTION INTRAVENOUS ONCE
Status: CANCELLED | OUTPATIENT
Start: 2018-01-15 | End: 2018-01-15

## 2018-01-02 RX ADMIN — BORTEZOMIB 2.7 MG: 3.5 INJECTION, POWDER, LYOPHILIZED, FOR SOLUTION INTRAVENOUS; SUBCUTANEOUS at 15:10

## 2018-01-02 ASSESSMENT — PAIN SCALES - GENERAL: PAINLEVEL: NO PAIN (0)

## 2018-01-02 NOTE — LETTER
"    1/2/2018         RE: Quiana Dunaway  [address] [facility] [address]        Dear Colleague,    Thank you for referring your patient, Quiana Dunaway, to the [facility]. Please see a copy of my visit note below.    Oncology Rooming Note    January 2, 2018 1:57 PM   Quiana Dunaway is a 82 year old female who presents for:    Chief Complaint   Patient presents with     Oncology Clinic Visit     Multiple myeloma not having achieved remission      Initial Vitals: /78 (BP Location: Left arm, Patient Position: Sitting, Cuff Size: Adult Regular)  Pulse 82  Temp 98.5  F (36.9  C) (Oral)  Resp 16  Wt 73.6 kg (162 lb 3.2 oz)  SpO2 96%  BMI 29.66 kg/m2 Estimated body mass index is 29.66 kg/(m^2) as calculated from the following:    Height as of 12/18/17: 1.575 m (5' 2.01\").    Weight as of this encounter: 73.6 kg (162 lb 3.2 oz). Body surface area is 1.79 meters squared.  No Pain (0) Comment: Data Unavailable   No LMP recorded. Patient is postmenopausal.  Allergies reviewed: Yes  Medications reviewed: Yes    Medications: MEDICATION REFILLS NEEDED TODAY. Provider was notified.  MEDICATION REFILL NEEDED ON COMPAZINE  Pharmacy name entered into [facility]:    [facility] [place], [place] - [address]    Clinical concerns: None                4 minutes for nursing intake (face to face time)     Isabel Sarmiento MA              HEMATOLOGY HISTORY: Mrs. Dunaway is a lady with multiple myeloma (IgG kappa). High risk, t(14;16).  1. She was evaluated for pancytopenia. On 05/01/2017:  -WBC of 3.4, hemoglobin of 10.2 and platelet of 154.    -CMP, Vitamin B12, folate, iron, ferritin, TSH aand haptoglobin are all normal.  2.  CT chest angiogram on 03/07/2017 did not reveal pulmonary embolism.  There is left thyroid nodule measuring 2.6 cm.  There is a 1.2 cm cystic appearing lesion in pancreatic tail.  It could be IPMN.  No splenomegaly.  3.  Colonoscopy on 04/28/2017 revealed colon " polyps. Pathology revealed tubular adenoma.  No high-grade dysplasia.  4.  SPEP on 07/07/2017 reveals M-spike of 1.8.  5. Bone marrow biopsy on 07/12/2017 reveals hypercellular bone marrow with kappa monotypic plasma cell population consistent with multiple myeloma.  Bone marrow is 70% cellular.  Plasma cell is 50-60%.     -There is gain of chromosome 1, 5, 9, 15, and 17.  There is translocation 14;16.   6.  Multiple labs were done on 07/18/2017:  -M-spike of 1.9.   -Immunofixation reveals monoclonal IgG kappa.    -IgG level of 2970.  IgA of 15 and IgM of 175.    -Kappa free light chain of 67.7.  Lambda free light chain of 1.42.  Ratio of kappa to lambda of 47.71.    -Beta 2 microglobulin of 3.4.   7. PET scan on 07/24/2017 reveals several focal areas of increased FDG uptake consistent with multiple myeloma.  There is probable epithelial cyst in tail of the pancreas.  No associated abnormal FDG activity.  Left thyroid cysts or nodules without any FDG activity.  There is a 0.3 cm left upper lobe lung nodule.   8.  Velcade, Revlimid and dexamethasone started on 08/14/2017.  9. On 10/02/2017 (after 2 cycles):  -M-spike of 0.5  -Kappa free light chain of 4.3  -IgG of 717.    SUBJECTIVE:  Ms. Quiana Dunaway is an 82-year-old female with IgG kappa multiple myeloma.  She has translocation 11:16.  She is on Velcade, Revlimid and dexamethasone since 08/2017.  Her disease has been responding to treatment.      Overall, she is tolerating treatment well.  Main side effect has been fatigue.  Last week she did not get Velcade because of [place] holidays.  The patient said that she felt better.  Fatigue was less.      REVIEW OF SYSTEMS:  Denies any headache.  No dizziness.  No chest pain or difficulty breathing.  No abdominal pain, nausea or vomiting.  No urinary or bowel complaints.  No bleeding.  No fever, chills or night sweats.  She does not have any neuropathy.  No numbness or tingling in the extremities.     PHYSICAL  EXAMINATION:    Alert and oriented × 3.  Eyes: No icterus.    Throat: No ulcer.    Neck: No lymphadenopathy.  Nontender.  Axilla: No lymphadenopathy.  Lungs: Good air entry bilaterally.  Occasional wheezing.  Heart: Regular.  Abdomen: Soft.  Nondistended.  Nontender.  No masses felt.  Extremities: No edema.  Skin: No petechiae.      ASSESSMENT:   1.  An 82-year-old female with high-risk IgG kappa multiple myeloma on Velcade, Revlimid and dexamethasone.   2.  Fatigue from myeloma and treatment.      PLAN:   1.  Patient overall is stable from myeloma.  Patient is currently on Velcade, Revlimid and dexamethasone.  Overall, she is tolerating it well.  She does get some side effects including fatigue.  She also gets some nausea.      The patient at this time is agreeable to continue with Velcade, Revlimid and dexamethasone.  I explained to the patient that if she starts having significant side effect of fatigue or she develops neuropathy we may consider stopping the Velcade and continue on Revlimid and dexamethasone.  For now, she is okay to continue all 3 drugs.  Side effects reviewed.      2.  For bone health, she will continue on Zometa once a month.  She is tolerating it well.  She does not have any dental or jaw related symptoms.     3.  She had a few questions which were all answered.  I will see her in a month.  Advised her to return sooner if she has fever, chills, infection, worsening weakness, shortness of breath or any other concerns.      Total face-to-face time spent 25 minutes, more than 50% of the time spent in counseling and coordination of care.         YVES PEREZ MD             D: 2018 14:37   T: 2018 14:58   MT: SK      Name:     DUGLAS CASTILLO   MRN:      [id number]        Account:      [id number]   :      1935           Visit Date:   2018      Document: [id number]        Again, thank you for allowing me to participate in the care of your patient.         Sincerely,        Alex Parry MD

## 2018-01-02 NOTE — PROGRESS NOTES
Medical Assistant Note:  Quiana Dunaway presents today for lab visit.    Patient seen by provider today: Yes: Dr. Parry.   present during visit today: Not Applicable.    Concerns: No Concerns.    Procedure:  Lab draw site: RAC, Needle type: BF, Gauge: 21 g gauze and coban applied.    Post Assessment:  Labs drawn without difficulty: Yes.    Discharge Plan:  Departure Mode: Ambulatory.    Face to Face Time: 4.    Isabel Sarmiento MA

## 2018-01-02 NOTE — PROGRESS NOTES
Oral Chemotherapy Monitoring Program.    Patient currently on revlimid therapy.    Reviewed labs from 1/2/18    No concerning abnormalities.    Questions answered to patient's satisfaction.    Will follow up in 3 weeks with repeat labs.    Debra Matos PharmD  January 2, 2018

## 2018-01-02 NOTE — PROGRESS NOTES
HEMATOLOGY HISTORY: Mrs. Dunaway is a lady with multiple myeloma (IgG kappa). High risk, t(14;16).  1. She was evaluated for pancytopenia. On 05/01/2017:  -WBC of 3.4, hemoglobin of 10.2 and platelet of 154.    -CMP, Vitamin B12, folate, iron, ferritin, TSH aand haptoglobin are all normal.  2.  CT chest angiogram on 03/07/2017 did not reveal pulmonary embolism.  There is left thyroid nodule measuring 2.6 cm.  There is a 1.2 cm cystic appearing lesion in pancreatic tail.  It could be IPMN.  No splenomegaly.  3.  Colonoscopy on 04/28/2017 revealed colon polyps. Pathology revealed tubular adenoma.  No high-grade dysplasia.  4.  SPEP on 07/07/2017 reveals M-spike of 1.8.  5. Bone marrow biopsy on 07/12/2017 reveals hypercellular bone marrow with kappa monotypic plasma cell population consistent with multiple myeloma.  Bone marrow is 70% cellular.  Plasma cell is 50-60%.     -There is gain of chromosome 1, 5, 9, 15, and 17.  There is translocation 14;16.   6.  Multiple labs were done on 07/18/2017:  -M-spike of 1.9.   -Immunofixation reveals monoclonal IgG kappa.    -IgG level of 2970.  IgA of 15 and IgM of 175.    -Kappa free light chain of 67.7.  Lambda free light chain of 1.42.  Ratio of kappa to lambda of 47.71.    -Beta 2 microglobulin of 3.4.   7. PET scan on 07/24/2017 reveals several focal areas of increased FDG uptake consistent with multiple myeloma.  There is probable epithelial cyst in tail of the pancreas.  No associated abnormal FDG activity.  Left thyroid cysts or nodules without any FDG activity.  There is a 0.3 cm left upper lobe lung nodule.   8.  Velcade, Revlimid and dexamethasone started on 08/14/2017.  9. On 10/02/2017 (after 2 cycles):  -M-spike of 0.5  -Kappa free light chain of 4.3  -IgG of 717.    SUBJECTIVE:  Ms. Quiana Dunaway is an 82-year-old female with IgG kappa multiple myeloma.  She has translocation 11:16.  She is on Velcade, Revlimid and dexamethasone since 08/2017.  Her disease has  been responding to treatment.      Overall, she is tolerating treatment well.  Main side effect has been fatigue.  Last week she did not get Velcade because of [place] holidays.  The patient said that she felt better.  Fatigue was less.      REVIEW OF SYSTEMS:  Denies any headache.  No dizziness.  No chest pain or difficulty breathing.  No abdominal pain, nausea or vomiting.  No urinary or bowel complaints.  No bleeding.  No fever, chills or night sweats.  She does not have any neuropathy.  No numbness or tingling in the extremities.     PHYSICAL EXAMINATION:    Alert and oriented × 3.  Eyes: No icterus.    Throat: No ulcer.    Neck: No lymphadenopathy.  Nontender.  Axilla: No lymphadenopathy.  Lungs: Good air entry bilaterally.  Occasional wheezing.  Heart: Regular.  Abdomen: Soft.  Nondistended.  Nontender.  No masses felt.  Extremities: No edema.  Skin: No petechiae.      ASSESSMENT:   1.  An 82-year-old female with high-risk IgG kappa multiple myeloma on Velcade, Revlimid and dexamethasone.   2.  Fatigue from myeloma and treatment.      PLAN:   1.  Patient overall is stable from myeloma.  Patient is currently on Velcade, Revlimid and dexamethasone.  Overall, she is tolerating it well.  She does get some side effects including fatigue.  She also gets some nausea.      The patient at this time is agreeable to continue with Velcade, Revlimid and dexamethasone.  I explained to the patient that if she starts having significant side effect of fatigue or she develops neuropathy we may consider stopping the Velcade and continue on Revlimid and dexamethasone.  For now, she is okay to continue all 3 drugs.  Side effects reviewed.      2.  For bone health, she will continue on Zometa once a month.  She is tolerating it well.  She does not have any dental or jaw related symptoms.     3.  She had a few questions which were all answered.  I will see her in a month.  Advised her to return sooner if she has fever, chills,  infection, worsening weakness, shortness of breath or any other concerns.      Total face-to-face time spent 25 minutes, more than 50% of the time spent in counseling and coordination of care.         YVES PEREZ MD             D: 2018 14:37   T: 2018 14:58   MT: SK      Name:     DUGLAS CASTILLO   MRN:      [id number]        Account:      [id number]   :      1935           Visit Date:   2018      Document: [id number]

## 2018-01-02 NOTE — PATIENT INSTRUCTIONS
Continue chemotherapy. Noted and brought back to [facility]  Follow up in 1 month. Will add exam to 1/29 Velcade appointment  Scheduled/Nida LOCK printed and given to patient/Nida

## 2018-01-02 NOTE — MR AVS SNAPSHOT
After Visit Summary   1/2/2018    Quiana Dunaway    MRN: [id number]           Patient Information     Date Of Birth          1935        Visit Information        Provider Department      1/2/2018 2:30 PM SH INFUSION CHAIR 5 [facility]        Today's Diagnoses     Multiple myeloma not having achieved remission (H)    -  1       Follow-ups after your visit        Your next 10 appointments already scheduled     Jan 08, 2018  2:00 PM CST   Level 2 with SH INFUSION CHAIR 5   [facility]    [facility] [place] [id number]  [place] [address]   [phone]            Guru 15, 2018  2:00 PM CST   Level 2 with SH INFUSION CHAIR 5   [facility])    [facility]  [address]  [place] [address]   [phone]            Jan 22, 2018  2:00 PM CST   Level 2 with SH INFUSION CHAIR 18   [facility]    [facility]  [address]  [place] [address]   [phone]            Jan 29, 2018  2:00 PM CST   Level 2 with SH INFUSION CHAIR 5   [facility]    [facility]  [address]  [place] [address]   [phone]            Jan 29, 2018  2:30 PM CST   Return Visit with Alex Parry MD   [facility]    [facility]  [address]  [place] [address]   [phone]            Feb 05, 2018  2:00 PM CST   Level 2 with SH INFUSION CHAIR 4   [facility])    [facility]  [address]  [place] [address]   [phone]            Apr 09, 2018  2:30 PM CDT    Remote PPM Check with RIOS TECH1   [facility]    [address]  [place] [address] [phone]           This appointment is for a remote check of your pacemaker.  This is not an appointment at the office.            Apr 09, 2018  3:15 PM CDT   [facility] EP RETURN with Alberto Worthington MD   [facility])    [address]  [place] [address] [phone]              Who to contact     If you have questions or need follow up information about today's clinic visit or your schedule please contact [facility] directly at [phone].  Normal or non-critical lab and imaging results will be communicated to you by MyChart, letter or phone within 4 business days after the clinic has received the results. If you do not hear from us within 7 days, please contact the clinic through [employer] or phone. If you have a critical or abnormal lab result, we will notify you by phone as soon as possible.  Submit refill requests through [employer] or call your pharmacy and they will forward the refill request to us. Please allow 3 business days for your refill to be completed.          Additional Information About Your Visit        [employer] Information     [facility] gives you secure access to your electronic health record. If you see a primary care provider, you can also send messages to your care team and make appointments. If you have questions, please call your primary care clinic.  If you do not have a primary care provider, please call [phone] and they will assist you.        Care EveryWhere ID     This is your Care EveryWhere ID. This could be used by other organizations to access your [facility] medical records  [id number]         Blood Pressure from Last 3 Encounters:   01/02/18 144/78   12/18/17 143/74   12/11/17 136/65    Weight from Last 3  Encounters:   01/02/18 73.6 kg (162 lb 3.2 oz)   12/18/17 74.2 kg (163 lb 9.6 oz)   12/11/17 72.1 kg (159 lb)              Today, you had the following     No orders found for display       Primary Care Provider Office Phone # Fax #    César GISELE Chung [phone]       [facility] [address] [facility] [address]  [facility] [address]        Equal Access to Services     [facility] LORI : Hadii aad ku hadasho Soomaali, waaxda luqadaha, qaybta kaalmada adeegyada, waxay idiin hayaan adeeg kharash lasultana . So [facility] [phone].    ATENCIÓN: Si habla español, tiene a contreras disposición servicios gratuitos de asistencia lingüística. [facility] [phone].    We comply with applicable federal civil rights laws and [place] laws. We do not discriminate on the basis of race, color, national origin, age, disability, sex, sexual orientation, or gender identity.            Thank you!     Thank you for choosing [facility]  for your care. Our goal is always to provide you with excellent care. Hearing back from our patients is one way we can continue to improve our services. Please take a few minutes to complete the written survey that you may receive in the mail after your visit with us. Thank you!             Your Updated Medication List - Protect others around you: Learn how to safely use, store and throw away your medicines at www.disposemymeds.org.          This list is accurate as of: 1/2/18  4:21 PM.  Always use your most recent med list.                   Brand Name Dispense Instructions for use Diagnosis    ACYCLOVIR PO      Take 400 mg by mouth 2 times daily        aspirin 81 MG chewable tablet      Take 81 mg by mouth daily        calcium carbonate-vitamin D 600-400 MG-UNIT Chew     180 tablet    Take 1 chew tab by mouth 2 times daily    Multiple myeloma not having achieved remission (H)       * dexamethasone 4 MG tablet    DECADRON    30 tablet    Take 10 tablets (40 mg) by  mouth every 7 days Days 1, 8, and 15.    Multiple myeloma not having achieved remission (H)       * dexamethasone 4 MG tablet    DECADRON    30 tablet    Take 10 tablets (40 mg) by mouth every 7 days for 3 doses Days 1, 8, and 15.    Multiple myeloma not having achieved remission (H)       escitalopram 5 MG tablet    LEXAPRO    90 tablet    TAKE 1 TABLET BY MOUTH DAILY    ROBER (generalized anxiety disorder)       LENalidomide 10 MG Caps capsule CHEMO    REVLIMID    14 capsule    Take 1 capsule (10 mg) by mouth daily for 14 days Days 1 through 14.    Multiple myeloma not having achieved remission (H)       lisinopril 10 MG tablet    PRINIVIL/ZESTRIL    90 tablet    Take 1 tablet (10 mg) by mouth daily    Benign essential hypertension       LORazepam 0.5 MG tablet    ATIVAN    10 tablet    Take 1 tablet (0.5 mg) by mouth every 8 hours as needed (Anxiety, Nausea/Vomiting or Sleep)    Multiple myeloma not having achieved remission (H)       nitroGLYcerin 0.4 MG sublingual tablet    NITROSTAT    25 tablet    For chest pain place 1 tablet under the tongue every 5 minutes for 3 doses. If symptoms persist 5 minutes after 1st dose call 911.    Atypical chest pain       * order for DME     1 Units    Dispense one 4 wheeled walker with hand brakes and seat    Multiple myeloma not having achieved remission (H)       * order for DME     1 Units    Wheelchair.    Need for assistance due to unsteady gait       prochlorperazine 10 MG tablet    COMPAZINE    30 tablet    Take 1 tablet (10 mg) by mouth every 6 hours as needed (Nausea/Vomiting)    Multiple myeloma not having achieved remission (H)       * Notice:  This list has 4 medication(s) that are the same as other medications prescribed for you. Read the directions carefully, and ask your doctor or other care provider to review them with you.

## 2018-01-02 NOTE — PROGRESS NOTES
"Oncology Rooming Note    January 2, 2018 1:57 PM   Quiana Dunaway is a 82 year old female who presents for:    Chief Complaint   Patient presents with     Oncology Clinic Visit     Multiple myeloma not having achieved remission      Initial Vitals: /78 (BP Location: Left arm, Patient Position: Sitting, Cuff Size: Adult Regular)  Pulse 82  Temp 98.5  F (36.9  C) (Oral)  Resp 16  Wt 73.6 kg (162 lb 3.2 oz)  SpO2 96%  BMI 29.66 kg/m2 Estimated body mass index is 29.66 kg/(m^2) as calculated from the following:    Height as of 12/18/17: 1.575 m (5' 2.01\").    Weight as of this encounter: 73.6 kg (162 lb 3.2 oz). Body surface area is 1.79 meters squared.  No Pain (0) Comment: Data Unavailable   No LMP recorded. Patient is postmenopausal.  Allergies reviewed: Yes  Medications reviewed: Yes    Medications: MEDICATION REFILLS NEEDED TODAY. Provider was notified.  MEDICATION REFILL NEEDED ON COMPAZINE  Pharmacy name entered into [facility]:    [facility] [place] - [address]    Clinical concerns: None                4 minutes for nursing intake (face to face time)     Isabel Sarmiento MA            "

## 2018-01-02 NOTE — LETTER
1/2/2018         RE: Quiana Dunaway  [address] [facility] [address]        Dear Colleague,    Thank you for referring your patient, Quiana Dunaway, to the [facility]. Please see a copy of my visit note below.    Medical Assistant Note:  Quiana Dunaway presents today for lab visit.    Patient seen by provider today: Yes: Dr. Parry.   present during visit today: Not Applicable.    Concerns: No Concerns.    Procedure:  Lab draw site: RAC, Needle type: BF, Gauge: 21 g gauze and coban applied.    Post Assessment:  Labs drawn without difficulty: Yes.    Discharge Plan:  Departure Mode: Ambulatory.    Face to Face Time: 4.    Isabel Sarmiento MA              Again, thank you for allowing me to participate in the care of your patient.        Sincerely,        Oncology Nurse

## 2018-01-02 NOTE — PROGRESS NOTES
[employer] DR PONCE (D) Remote PPM Device Check  AP: 72% : 2%  Mode: DDDR        Presenting Rhythm: AP/VS  Heart Rate: adequate heart rates per histogram  Sensing: stable    Pacing Threshold: stable    Impedance: stable  Battery Status: 8 - 10.5 years remaining  Atrial Arrhythmia: 3 brief mode switch episodes, no EGMs available  Ventricular Arrhythmia: 1 vent high rate. EGM shows Vs > As lasting 5 beats, rate 160bpm. EF 60 - 65% (2017). Reviewed finding with Dorinda HARMON.    Care Plan: F/U [facility] q 3 months. Mailed letter with results and next transmission date. Junior JARRETT

## 2018-01-02 NOTE — PROGRESS NOTES
Infusion Nursing Note:  Quiana B Emelyn presents today for velcade C7D1.    Patient seen by provider today: Yes: Dr Parry   present during visit today: Not Applicable.    Note: N/A.    Intravenous Access:  Labs drawn without difficulty.    Treatment Conditions:  Lab Results   Component Value Date    HGB 12.6 01/02/2018     Lab Results   Component Value Date    WBC 5.2 01/02/2018      Lab Results   Component Value Date    ANEU 2.5 01/02/2018     Lab Results   Component Value Date     01/02/2018      Results reviewed, labs MET treatment parameters, ok to proceed with treatment.        Post Infusion Assessment:  Patient tolerated injection without incident.    Discharge Plan:   Discharge instructions reviewed with: Patient.  Patient and/or family verbalized understanding of discharge instructions and all questions answered.  Copy of AVS reviewed with patient and/or family.  Patient will return 1/8 for next appointment.  Patient discharged in stable condition accompanied by: self.  Departure Mode: Ambulatory.    FAUSTINO Cota RN

## 2018-01-02 NOTE — MR AVS SNAPSHOT
After Visit Summary   1/2/2018    Quiana Dunaway    MRN: [id number]           Patient Information     Date Of Birth          1935        Visit Information        Provider Department      1/2/2018 2:00 PM Alex Parry MD [facility]        Today's Diagnoses     Multiple myeloma not having achieved remission (H)    -  1      Care Instructions    Continue chemotherapy. Noted and brought back to [facility]  Follow up in 1 month. Will add exam to 1/29 Velcade appointment  Scheduled/Nida          Follow-ups after your visit        Your next 10 appointments already scheduled     Jan 08, 2018  2:00 PM CST   Level 2 with SH INFUSION CHAIR 5   [facility] ([facility])    [facility]  [address]  [place] [address]   [phone]            Guru 15, 2018  2:00 PM CST   Level 2 with SH INFUSION CHAIR 5   [facility] ([facility])    [facility]  [address]  [place] [address]   [phone]            Jan 22, 2018  2:00 PM CST   Level 2 with SH INFUSION CHAIR 18   [facility] ([facility])    [facility]  [address]  [place] [address]   [phone]            Jan 29, 2018  2:00 PM CST   Level 2 with SH INFUSION CHAIR 5   [facility] ([facility])    [facility]  [address]  [place] [address]   [phone]            Jan 29, 2018  2:30 PM CST   Return Visit with Alex Parry MD   [facility] ([facility])    [facility]  [address]  [place] [address]   [phone]            Feb 05, 2018  2:00 PM CST   Level 2 with SH INFUSION CHAIR 4   [facility] ([place])    [facility]  [address]  [place] [address] [phone]            Apr 09, 2018  2:30 PM CDT   Remote PPM Check with GABRIEL PAUL   [facility] ([facility])    [address]  [place] [id number] [phone]           This appointment is for a remote check of your pacemaker.  This is not an appointment at the office.            Apr 09, 2018  3:15 PM CDT   [facility] EP RETURN with Alberto Worthington MD   [facility] ([facility])    [address]  [facility] [address] [phone]              Who to contact     If you have questions or need follow up information about today's clinic visit or your schedule please contact [facility] directly at [phone].  Normal or non-critical lab and imaging results will be communicated to you by [employer], letter or phone within 4 business days after the clinic has received the results. If you do not hear from us within 7 days, please contact the clinic through [employer] [facility] or phone. If you have a critical or abnormal lab result, we will notify you by phone as soon as possible.  Submit refill requests through [employer] [facility] [employer] or call your pharmacy and they will forward the refill request to us. Please allow 3 business days for your refill to be completed.          Additional Information About Your Visit        [facility] [employer] [facility] Information     [facility] gives you secure access to your electronic health record. If you see a primary care provider, you can also send messages to your care team and make appointments. If you have questions, please call your primary care clinic.  If you do not have a primary care provider, please call [phone] and they will assist you.        Care EveryWhere ID     This is your Care EveryWhere ID. This could be used by other organizations to access your [facility]  records  [id number]        Your Vitals Were     Pulse Temperature Respirations Pulse Oximetry BMI (Body Mass Index)       82 98.5  F (36.9  C) (Oral) 16 96% 29.66 kg/m2        Blood Pressure from Last 3 Encounters:   01/02/18 144/78   12/18/17 143/74   12/11/17 136/65    Weight from Last 3 Encounters:   01/02/18 73.6 kg (162 lb 3.2 oz)   12/18/17 74.2 kg (163 lb 9.6 oz)   12/11/17 72.1 kg (159 lb)              We Performed the Following     CBC with platelets differential     Comprehensive metabolic panel        Primary Care Provider Office Phone # Fax #    César Chung [phone]       [facility] [address] [address]        Equal Access to Services     KEVON SANDOVAL : Hadii aad ku hadasho Soomaali, waaxda luqadaha, qaybta kaalmada adeegyada, phoenix gaines haydiana rodriguez . So [facility] [phone].    ATENCIÓN: Si habla español, tiene a contreras disposición servicios gratuitos de asistencia lingüística. Llame al [phone].    We comply with applicable federal civil rights laws and [place] laws. We do not discriminate on the basis of race, color, national origin, age, disability, sex, sexual orientation, or gender identity.            Thank you!     Thank you for choosing [facility]  for your care. Our goal is always to provide you with excellent care. Hearing back from our patients is one way we can continue to improve our services. Please take a few minutes to complete the written survey that you may receive in the mail after your visit with us. Thank you!             Your Updated Medication List - Protect others around you: Learn how to safely use, store and throw away your medicines at www.disposemymeds.org.          This list is accurate as of: 1/2/18  2:57 PM.  Always use your most recent med list.                   Brand Name Dispense Instructions for use Diagnosis    ACYCLOVIR PO      Take 400 mg by mouth 2 times daily        aspirin  81 MG chewable tablet      Take 81 mg by mouth daily        calcium carbonate-vitamin D 600-400 MG-UNIT Chew     180 tablet    Take 1 chew tab by mouth 2 times daily    Multiple myeloma not having achieved remission (H)       * dexamethasone 4 MG tablet    DECADRON    30 tablet    Take 10 tablets (40 mg) by mouth every 7 days Days 1, 8, and 15.    Multiple myeloma not having achieved remission (H)       * dexamethasone 4 MG tablet    DECADRON    30 tablet    Take 10 tablets (40 mg) by mouth every 7 days for 3 doses Days 1, 8, and 15.    Multiple myeloma not having achieved remission (H)       escitalopram 5 MG tablet    LEXAPRO    90 tablet    TAKE 1 TABLET BY MOUTH DAILY    ROBER (generalized anxiety disorder)       LENalidomide 10 MG Caps capsule CHEMO    REVLIMID    14 capsule    Take 1 capsule (10 mg) by mouth daily for 14 days Days 1 through 14.    Multiple myeloma not having achieved remission (H)       lisinopril 10 MG tablet    PRINIVIL/ZESTRIL    90 tablet    Take 1 tablet (10 mg) by mouth daily    Benign essential hypertension       LORazepam 0.5 MG tablet    ATIVAN    10 tablet    Take 1 tablet (0.5 mg) by mouth every 8 hours as needed (Anxiety, Nausea/Vomiting or Sleep)    Multiple myeloma not having achieved remission (H)       nitroGLYcerin 0.4 MG sublingual tablet    NITROSTAT    25 tablet    For chest pain place 1 tablet under the tongue every 5 minutes for 3 doses. If symptoms persist 5 minutes after 1st dose call 911.    Atypical chest pain       * order for DME     1 Units    Dispense one 4 wheeled walker with hand brakes and seat    Multiple myeloma not having achieved remission (H)       * order for DME     1 Units    Wheelchair.    Need for assistance due to unsteady gait       prochlorperazine 10 MG tablet    COMPAZINE    30 tablet    Take 1 tablet (10 mg) by mouth every 6 hours as needed (Nausea/Vomiting)    Multiple myeloma not having achieved remission (H)       * Notice:  This list has 4  medication(s) that are the same as other medications prescribed for you. Read the directions carefully, and ask your doctor or other care provider to review them with you.

## 2018-01-04 RX ORDER — DIPHENHYDRAMINE HYDROCHLORIDE 50 MG/ML
50 INJECTION INTRAMUSCULAR; INTRAVENOUS
Status: CANCELLED
Start: 2018-01-09

## 2018-01-04 RX ORDER — ALBUTEROL SULFATE 90 UG/1
1-2 AEROSOL, METERED RESPIRATORY (INHALATION)
Status: CANCELLED
Start: 2018-01-09

## 2018-01-04 RX ORDER — ALBUTEROL SULFATE 0.83 MG/ML
2.5 SOLUTION RESPIRATORY (INHALATION)
Status: CANCELLED | OUTPATIENT
Start: 2018-01-09

## 2018-01-04 RX ORDER — EPINEPHRINE 1 MG/ML
0.3 INJECTION, SOLUTION INTRAMUSCULAR; SUBCUTANEOUS EVERY 5 MIN PRN
Status: CANCELLED | OUTPATIENT
Start: 2018-01-09

## 2018-01-04 RX ORDER — LORAZEPAM 2 MG/ML
0.5 INJECTION INTRAMUSCULAR EVERY 4 HOURS PRN
Status: CANCELLED
Start: 2018-01-09

## 2018-01-04 RX ORDER — SODIUM CHLORIDE 9 MG/ML
1000 INJECTION, SOLUTION INTRAVENOUS CONTINUOUS PRN
Status: CANCELLED
Start: 2018-01-09

## 2018-01-04 RX ORDER — EPINEPHRINE 0.3 MG/.3ML
0.3 INJECTION SUBCUTANEOUS EVERY 5 MIN PRN
Status: CANCELLED | OUTPATIENT
Start: 2018-01-09

## 2018-01-04 RX ORDER — MEPERIDINE HYDROCHLORIDE 25 MG/ML
25 INJECTION INTRAMUSCULAR; INTRAVENOUS; SUBCUTANEOUS EVERY 30 MIN PRN
Status: CANCELLED | OUTPATIENT
Start: 2018-01-09

## 2018-01-04 RX ORDER — METHYLPREDNISOLONE SODIUM SUCCINATE 125 MG/2ML
125 INJECTION, POWDER, LYOPHILIZED, FOR SOLUTION INTRAMUSCULAR; INTRAVENOUS
Status: CANCELLED
Start: 2018-01-09

## 2018-01-08 ENCOUNTER — INFUSION THERAPY VISIT (OUTPATIENT)
Dept: INFUSION THERAPY | Facility: CLINIC | Age: 83
End: 2018-01-08
Attending: INTERNAL MEDICINE
Payer: MEDICARE

## 2018-01-08 ENCOUNTER — HOSPITAL ENCOUNTER (OUTPATIENT)
Facility: CLINIC | Age: 83
Setting detail: SPECIMEN
Discharge: HOME | End: 2018-01-08
Attending: INTERNAL MEDICINE | Admitting: INTERNAL MEDICINE
Payer: MEDICARE

## 2018-01-08 VITALS
RESPIRATION RATE: 16 BRPM | DIASTOLIC BLOOD PRESSURE: 55 MMHG | WEIGHT: 161 LBS | SYSTOLIC BLOOD PRESSURE: 135 MMHG | HEART RATE: 75 BPM | TEMPERATURE: 98.1 F | BODY MASS INDEX: 29.44 KG/M2

## 2018-01-08 DIAGNOSIS — C90.00 MULTIPLE MYELOMA NOT HAVING ACHIEVED REMISSION (H): Primary | ICD-10-CM

## 2018-01-08 LAB
BASOPHILS # BLD AUTO: 0 10E9/L (ref 0–0.2)
BASOPHILS NFR BLD AUTO: 0.2 %
DIFFERENTIAL METHOD BLD: NORMAL
EOSINOPHIL # BLD AUTO: 0.2 10E9/L (ref 0–0.7)
EOSINOPHIL NFR BLD AUTO: 4.2 %
ERYTHROCYTE [DISTWIDTH] IN BLOOD BY AUTOMATED COUNT: 15 % (ref 10–15)
HCT VFR BLD AUTO: 38.1 % (ref 35–47)
HGB BLD-MCNC: 12.6 G/DL (ref 11.7–15.7)
IMM GRANULOCYTES # BLD: 0 10E9/L (ref 0–0.4)
IMM GRANULOCYTES NFR BLD: 0.4 %
LYMPHOCYTES # BLD AUTO: 1.6 10E9/L (ref 0.8–5.3)
LYMPHOCYTES NFR BLD AUTO: 33.2 %
MCH RBC QN AUTO: 28.2 PG (ref 26.5–33)
MCHC RBC AUTO-ENTMCNC: 33.1 G/DL (ref 31.5–36.5)
MCV RBC AUTO: 85 FL (ref 78–100)
MONOCYTES # BLD AUTO: 0.5 10E9/L (ref 0–1.3)
MONOCYTES NFR BLD AUTO: 10.4 %
NEUTROPHILS # BLD AUTO: 2.5 10E9/L (ref 1.6–8.3)
NEUTROPHILS NFR BLD AUTO: 51.6 %
NRBC # BLD AUTO: 0 10*3/UL
NRBC BLD AUTO-RTO: 0 /100
PLATELET # BLD AUTO: 170 10E9/L (ref 150–450)
RBC # BLD AUTO: 4.47 10E12/L (ref 3.8–5.2)
WBC # BLD AUTO: 4.8 10E9/L (ref 4–11)

## 2018-01-08 PROCEDURE — 25000128 H RX IP 250 OP 636: Performed by: INTERNAL MEDICINE

## 2018-01-08 PROCEDURE — 36415 COLL VENOUS BLD VENIPUNCTURE: CPT

## 2018-01-08 PROCEDURE — 85025 COMPLETE CBC W/AUTO DIFF WBC: CPT | Performed by: INTERNAL MEDICINE

## 2018-01-08 PROCEDURE — 96401 CHEMO ANTI-NEOPL SQ/IM: CPT

## 2018-01-08 RX ADMIN — BORTEZOMIB 2.7 MG: 3.5 INJECTION, POWDER, LYOPHILIZED, FOR SOLUTION INTRAVENOUS; SUBCUTANEOUS at 14:49

## 2018-01-08 ASSESSMENT — PAIN SCALES - GENERAL: PAINLEVEL: NO PAIN (0)

## 2018-01-08 NOTE — PROGRESS NOTES
Infusion Nursing Note:  Quiana Dunaway presents today for Velcade C7D8.    Patient seen by provider today: No   present during visit today: Not Applicable.    Note: N/A.    Intravenous Access:  Lab draw site right lower forearm, Needle type Butterfly, Gauge 23.  Labs drawn without difficulty.    Treatment Conditions:  Lab Results   Component Value Date    HGB 12.6 01/08/2018     Lab Results   Component Value Date    WBC 4.8 01/08/2018      Lab Results   Component Value Date    ANEU 2.5 01/08/2018     Lab Results   Component Value Date     01/08/2018      Results reviewed, labs MET treatment parameters, ok to proceed with treatment.    Post Infusion Assessment:  Patient tolerated injection without incident.  Site patent and intact, free from redness, edema or discomfort.  No evidence of extravasations.    Discharge Plan:   Discharge instructions reviewed with: Patient.  Patient and/or family verbalized understanding of discharge instructions and all questions answered.  AVS to patient via [employer].  Patient will return 1/15/2018 for next appointment.   Patient discharged in stable condition accompanied by: self.  Departure Mode: Ambulatory.    Ammy Thomas RN

## 2018-01-08 NOTE — MR AVS SNAPSHOT
After Visit Summary   1/8/2018    Quiana Dunaway    MRN: [id number]           Patient Information     Date Of Birth          1935        Visit Information        Provider Department      1/8/2018 2:00 PM SH INFUSION CHAIR 5 [facility]        Today's Diagnoses     Multiple myeloma not having achieved remission (H)    -  1       Follow-ups after your visit        Your next 10 appointments already scheduled     Guru 15, 2018  2:00 PM CST   Level 2 with SH INFUSION CHAIR 5   [facility] ([facility])    [place] [id number]  [place] [address]   [phone]            Jan 22, 2018  2:00 PM CST   Level 2 with SH INFUSION CHAIR 18   [facility] ([facility])    [facility]  [address]  [place] [address]   [phone]            Jan 29, 2018  2:00 PM CST   Level 2 with SH INFUSION CHAIR 5   [facility] ([facility])    [facility] [place] [id number]  [place] [address]   [phone]            Feb 05, 2018  2:00 PM CST   Level 2 with SH INFUSION CHAIR 4   [facility] ([facility])    [facility]  [address]  [place] [address]   [phone]            Feb 05, 2018  2:30 PM CST   Return Visit with Alex Parry MD   [facility] ([facility])    [facility]  [address]  [place] [address]   [phone]            Feb 12, 2018  2:00 PM CST   Level 2 with SH INFUSION CHAIR 3   [facility] ([facility])    [facility]  [address]  [place] [address]   [phone]            Feb 19, 2018  2:00 PM CST    Level 2 with SH INFUSION CHAIR 2   [facility] ([facility])    [facility] [place]  [address]  [place] [address]   [phone]            Mar 05, 2018  2:00 PM CST   Level 2 with SH INFUSION CHAIR 5   [facility] ([facility])    [facility] [place]  [address]  [place] [address]   [phone]            Mar 12, 2018  2:00 PM CDT   Level 2 with SH INFUSION CHAIR 7   [facility] ([facility])    [facility] [place]  [address]  [place] [address]   [phone]            Mar 19, 2018  2:00 PM CDT   Level 2 with SH INFUSION CHAIR 5   [facility] ([facility])    [facility] [place]  [address]  [place] [address]   [phone]              Who to contact     If you have questions or need follow up information about today's clinic visit or your schedule please contact [facility] directly at [phone].  Normal or non-critical lab and imaging results will be communicated to you by [employer], letter or phone within 4 business days after the clinic has received the results. If you do not hear from us within 7 days, please contact the clinic through [employer] or phone. If you have a critical or abnormal lab result, we will notify you by phone as soon as possible.  Submit refill requests through [employer] or call your pharmacy and they will forward the refill request to us. Please allow 3 business days for your refill to be completed.          Additional Information About Your Visit        [employer] Information     [employer] gives you secure access to your electronic health record. If you see a primary care provider, you can also send messages to your care team and make appointments. If you have  questions, please call your primary care clinic.  If you do not have a primary care provider, please call [phone] and they will assist you.        Care EveryWhere ID     This is your Care EveryWhere ID. This could be used by other organizations to access your [place] medical records  [id number]        Your Vitals Were     Pulse Temperature Respirations BMI (Body Mass Index)          75 98.1  F (36.7  C) (Oral) 16 29.44 kg/m2         Blood Pressure from Last 3 Encounters:   01/08/18 135/55   01/02/18 144/78   12/18/17 143/74    Weight from Last 3 Encounters:   01/08/18 73 kg (161 lb)   01/02/18 73.6 kg (162 lb 3.2 oz)   12/18/17 74.2 kg (163 lb 9.6 oz)              We Performed the Following     CBC with platelets differential        Primary Care Provider Office Phone # Fax #    César Chung [phone]       [place] [address]  [facility] [address]        Equal Access to Services     [facility]: Hadii aad ku hadasho Soomaali, waaxda luqadaha, qaybta kaalmada adeegyada, waxbrenton rodriguez . So [facility] [phone].    ATENCIÓN: Si habla español, tiene a contreras disposición servicios gratuitos de asistencia lingüística. [facility] [phone].    We comply with applicable federal civil rights laws and [place] laws. We do not discriminate on the basis of race, color, national origin, age, disability, sex, sexual orientation, or gender identity.            Thank you!     Thank you for choosing [facility]  for your care. Our goal is always to provide you with excellent care. Hearing back from our patients is one way we can continue to improve our services. Please take a few minutes to complete the written survey that you may receive in the mail after your visit with us. Thank you!             Your Updated Medication List - Protect others around you: Learn how to safely use, store and throw away your medicines at  www.disposemymeds.org.          This list is accurate as of: 1/8/18  2:59 PM.  Always use your most recent med list.                   Brand Name Dispense Instructions for use Diagnosis    ACYCLOVIR PO      Take 400 mg by mouth 2 times daily        aspirin 81 MG chewable tablet      Take 81 mg by mouth daily        calcium carbonate-vitamin D 600-400 MG-UNIT Chew     180 tablet    Take 1 chew tab by mouth 2 times daily    Multiple myeloma not having achieved remission (H)       * dexamethasone 4 MG tablet    DECADRON    30 tablet    Take 10 tablets (40 mg) by mouth every 7 days Days 1, 8, and 15.    Multiple myeloma not having achieved remission (H)       * dexamethasone 4 MG tablet    DECADRON    30 tablet    Take 10 tablets (40 mg) by mouth every 7 days for 3 doses Days 1, 8, and 15.    Multiple myeloma not having achieved remission (H)       escitalopram 5 MG tablet    LEXAPRO    90 tablet    TAKE 1 TABLET BY MOUTH DAILY    ROBER (generalized anxiety disorder)       LENalidomide 10 MG Caps capsule CHEMO    REVLIMID    14 capsule    Take 1 capsule (10 mg) by mouth daily for 14 days Days 1 through 14.    Multiple myeloma not having achieved remission (H)       lisinopril 10 MG tablet    PRINIVIL/ZESTRIL    90 tablet    Take 1 tablet (10 mg) by mouth daily    Benign essential hypertension       LORazepam 0.5 MG tablet    ATIVAN    10 tablet    Take 1 tablet (0.5 mg) by mouth every 8 hours as needed (Anxiety, Nausea/Vomiting or Sleep)    Multiple myeloma not having achieved remission (H)       nitroGLYcerin 0.4 MG sublingual tablet    NITROSTAT    25 tablet    For chest pain place 1 tablet under the tongue every 5 minutes for 3 doses. If symptoms persist 5 minutes after 1st dose call 911.    Atypical chest pain       * order for DME     1 Units    Dispense one 4 wheeled walker with hand brakes and seat    Multiple myeloma not having achieved remission (H)       * order for DME     1 Units    Wheelchair.    Need for  assistance due to unsteady gait       prochlorperazine 10 MG tablet    COMPAZINE    30 tablet    Take 1 tablet (10 mg) by mouth every 6 hours as needed (Nausea/Vomiting)    Multiple myeloma not having achieved remission (H)       * Notice:  This list has 4 medication(s) that are the same as other medications prescribed for you. Read the directions carefully, and ask your doctor or other care provider to review them with you.

## 2018-01-15 ENCOUNTER — INFUSION THERAPY VISIT (OUTPATIENT)
Dept: INFUSION THERAPY | Facility: CLINIC | Age: 83
End: 2018-01-15
Attending: INTERNAL MEDICINE
Payer: MEDICARE

## 2018-01-15 ENCOUNTER — HOSPITAL ENCOUNTER (OUTPATIENT)
Facility: CLINIC | Age: 83
Setting detail: SPECIMEN
Discharge: HOME | End: 2018-01-15
Attending: INTERNAL MEDICINE | Admitting: INTERNAL MEDICINE
Payer: MEDICARE

## 2018-01-15 VITALS
RESPIRATION RATE: 16 BRPM | WEIGHT: 165 LBS | TEMPERATURE: 98.5 F | BODY MASS INDEX: 30.17 KG/M2 | SYSTOLIC BLOOD PRESSURE: 142 MMHG | HEART RATE: 62 BPM | DIASTOLIC BLOOD PRESSURE: 72 MMHG

## 2018-01-15 DIAGNOSIS — C90.00 MULTIPLE MYELOMA NOT HAVING ACHIEVED REMISSION (H): Primary | ICD-10-CM

## 2018-01-15 LAB
ALBUMIN SERPL-MCNC: 3.4 G/DL (ref 3.4–5)
BASOPHILS # BLD AUTO: 0 10E9/L (ref 0–0.2)
BASOPHILS NFR BLD AUTO: 0.2 %
CALCIUM SERPL-MCNC: 8.9 MG/DL (ref 8.5–10.1)
CREAT SERPL-MCNC: 0.56 MG/DL (ref 0.52–1.04)
DIFFERENTIAL METHOD BLD: ABNORMAL
EOSINOPHIL # BLD AUTO: 0.2 10E9/L (ref 0–0.7)
EOSINOPHIL NFR BLD AUTO: 2.7 %
ERYTHROCYTE [DISTWIDTH] IN BLOOD BY AUTOMATED COUNT: 15.3 % (ref 10–15)
GFR SERPL CREATININE-BSD FRML MDRD: >90 ML/MIN/1.7M2
HCT VFR BLD AUTO: 36.1 % (ref 35–47)
HGB BLD-MCNC: 12.1 G/DL (ref 11.7–15.7)
IMM GRANULOCYTES # BLD: 0 10E9/L (ref 0–0.4)
IMM GRANULOCYTES NFR BLD: 0.5 %
LYMPHOCYTES # BLD AUTO: 1.6 10E9/L (ref 0.8–5.3)
LYMPHOCYTES NFR BLD AUTO: 27.4 %
MCH RBC QN AUTO: 28.6 PG (ref 26.5–33)
MCHC RBC AUTO-ENTMCNC: 33.5 G/DL (ref 31.5–36.5)
MCV RBC AUTO: 85 FL (ref 78–100)
MONOCYTES # BLD AUTO: 0.9 10E9/L (ref 0–1.3)
MONOCYTES NFR BLD AUTO: 15.7 %
NEUTROPHILS # BLD AUTO: 3.2 10E9/L (ref 1.6–8.3)
NEUTROPHILS NFR BLD AUTO: 53.5 %
NRBC # BLD AUTO: 0 10*3/UL
NRBC BLD AUTO-RTO: 0 /100
PLATELET # BLD AUTO: 167 10E9/L (ref 150–450)
RBC # BLD AUTO: 4.23 10E12/L (ref 3.8–5.2)
WBC # BLD AUTO: 6 10E9/L (ref 4–11)

## 2018-01-15 PROCEDURE — 82565 ASSAY OF CREATININE: CPT | Performed by: INTERNAL MEDICINE

## 2018-01-15 PROCEDURE — 82310 ASSAY OF CALCIUM: CPT | Performed by: INTERNAL MEDICINE

## 2018-01-15 PROCEDURE — 85025 COMPLETE CBC W/AUTO DIFF WBC: CPT | Performed by: INTERNAL MEDICINE

## 2018-01-15 PROCEDURE — 25000128 H RX IP 250 OP 636: Performed by: INTERNAL MEDICINE

## 2018-01-15 PROCEDURE — 96374 THER/PROPH/DIAG INJ IV PUSH: CPT

## 2018-01-15 PROCEDURE — 82040 ASSAY OF SERUM ALBUMIN: CPT | Performed by: INTERNAL MEDICINE

## 2018-01-15 PROCEDURE — 96401 CHEMO ANTI-NEOPL SQ/IM: CPT

## 2018-01-15 RX ORDER — ZOLEDRONIC ACID 0.04 MG/ML
4 INJECTION, SOLUTION INTRAVENOUS ONCE
Status: COMPLETED | OUTPATIENT
Start: 2018-01-15 | End: 2018-01-15

## 2018-01-15 RX ADMIN — ZOLEDRONIC ACID 4 MG: 0.04 INJECTION, SOLUTION INTRAVENOUS at 15:18

## 2018-01-15 RX ADMIN — BORTEZOMIB 2.7 MG: 3.5 INJECTION, POWDER, LYOPHILIZED, FOR SOLUTION INTRAVENOUS; SUBCUTANEOUS at 15:06

## 2018-01-15 ASSESSMENT — PAIN SCALES - GENERAL: PAINLEVEL: NO PAIN (0)

## 2018-01-15 NOTE — MR AVS SNAPSHOT
After Visit Summary   1/15/2018    Quiana Dunaway    MRN: [id number]           Patient Information     Date Of Birth          1935        Visit Information        Provider Department      1/15/2018 2:00 PM SH INFUSION CHAIR 5 [facility]        Today's Diagnoses     Multiple myeloma not having achieved remission (H)    -  1       Follow-ups after your visit        Your next 10 appointments already scheduled     Jan 22, 2018  2:00 PM CST   Level 2 with SH INFUSION CHAIR 18   [facility] ([facility])    [facility] [place] [id number]  [place] [address]   [phone]            Jan 29, 2018  2:00 PM CST   Level 2 with SH INFUSION CHAIR 5   [facility] ([facility])    [facility]  [address]  [place] [address]   [phone]            Feb 05, 2018  2:00 PM CST   Level 2 with SH INFUSION CHAIR 4   [facility] ([facility])    [facility] [place] [id number]  [place] [address]   [phone]            Feb 05, 2018  2:30 PM CST   Return Visit with Alex Parry MD   [facility] ([facility])    [facility]  [address]  [place] [address]   [phone]            Feb 12, 2018  2:00 PM CST   Level 2 with SH INFUSION CHAIR 3   [facility] ([facility])    [facility]  [address]  [place] [address]   [phone]            Feb 19, 2018  2:00 PM CST   Level 2 with SH INFUSION CHAIR 2   [facility] ([facility])    [facility]  [address]  [place] [address]   [phone]            Mar 05, 2018  2:00 PM CST    Level 2 with SH INFUSION CHAIR 5   [facility] ([facility])    [facility] [place]  [address]  [place] [address]   [phone]            Mar 12, 2018  2:00 PM CDT   Level 2 with SH INFUSION CHAIR 7   [facility] ([facility])    [facility]  [address]  [place] [address]   [phone]            Mar 19, 2018  2:00 PM CDT   Level 2 with SH INFUSION CHAIR 5   [facility] ([facility])    [facility] [place]  [address]  [place] [address]   [phone]            Mar 26, 2018  2:00 PM CDT   Level 2 with SH INFUSION CHAIR 9   [facility] ([facility])    [place] [id number]  [place] [address]   [phone]              Who to contact     If you have questions or need follow up information about today's clinic visit or your schedule please contact [facility] directly at [phone].  Normal or non-critical lab and imaging results will be communicated to you by Real Gravityhart, letter or phone within 4 business days after the clinic has received the results. If you do not hear from us within 7 days, please contact the clinic through [employer]ity[employer] or phone. If you have a critical or abnormal lab result, we will notify you by phone as soon as possible.  Submit refill requests through Quidsi or call your pharmacy and they will forward the refill request to us. Please allow 3 business days for your refill to be completed.          Additional Information About Your Visit        Quidsi Information     Quidsi gives you secure access to your electronic health record. If you see a primary care provider, you can also send messages to your care team and make appointments. If you have  questions, please call your primary care clinic.  If you do not have a primary care provider, please call [phone] and they will assist you.        Care EveryWhere ID     This is your Care EveryWhere ID. This could be used by other organizations to access your [place] medical records  [id number]        Your Vitals Were     Pulse Temperature Respirations BMI (Body Mass Index)          62 98.5  F (36.9  C) (Oral) 16 30.17 kg/m2         Blood Pressure from Last 3 Encounters:   01/15/18 142/72   01/08/18 135/55   01/02/18 144/78    Weight from Last 3 Encounters:   01/15/18 74.8 kg (165 lb)   01/08/18 73 kg (161 lb)   01/02/18 73.6 kg (162 lb 3.2 oz)              We Performed the Following     Albumin level     Calcium     CBC with platelets differential     Creatinine        Primary Care Provider Office Phone # Fax #    César Chung [phone]       [place] [address]  [facility] [address]        Equal Access to Services     [facility]: Hadii aad ku hadasho Soomaali, waaxda luqadaha, qaybta kaalmada adeegyada, phoenix rodriguez . So [facility] [phone].    ATENCIÓN: Si habla español, tiene a contreras disposición servicios gratuitos de asistencia lingüística. Chad al [phone].    We comply with applicable federal civil rights laws and [place] laws. We do not discriminate on the basis of race, color, national origin, age, disability, sex, sexual orientation, or gender identity.            Thank you!     Thank you for choosing [facility]  for your care. Our goal is always to provide you with excellent care. Hearing back from our patients is one way we can continue to improve our services. Please take a few minutes to complete the written survey that you may receive in the mail after your visit with us. Thank you!             Your Updated Medication List - Protect others around you: Learn how to safely use, store  and throw away your medicines at www.disposemymeds.org.          This list is accurate as of: 1/15/18  3:39 PM.  Always use your most recent med list.                   Brand Name Dispense Instructions for use Diagnosis    ACYCLOVIR PO      Take 400 mg by mouth 2 times daily        aspirin 81 MG chewable tablet      Take 81 mg by mouth daily        calcium carbonate-vitamin D 600-400 MG-UNIT Chew     180 tablet    Take 1 chew tab by mouth 2 times daily    Multiple myeloma not having achieved remission (H)       * dexamethasone 4 MG tablet    DECADRON    30 tablet    Take 10 tablets (40 mg) by mouth every 7 days Days 1, 8, and 15.    Multiple myeloma not having achieved remission (H)       * dexamethasone 4 MG tablet    DECADRON    30 tablet    Take 10 tablets (40 mg) by mouth every 7 days for 3 doses Days 1, 8, and 15.    Multiple myeloma not having achieved remission (H)       escitalopram 5 MG tablet    LEXAPRO    90 tablet    TAKE 1 TABLET BY MOUTH DAILY    ROBER (generalized anxiety disorder)       LENalidomide 10 MG Caps capsule CHEMO    REVLIMID    14 capsule    Take 1 capsule (10 mg) by mouth daily for 14 days Days 1 through 14.    Multiple myeloma not having achieved remission (H)       lisinopril 10 MG tablet    PRINIVIL/ZESTRIL    90 tablet    Take 1 tablet (10 mg) by mouth daily    Benign essential hypertension       LORazepam 0.5 MG tablet    ATIVAN    10 tablet    Take 1 tablet (0.5 mg) by mouth every 8 hours as needed (Anxiety, Nausea/Vomiting or Sleep)    Multiple myeloma not having achieved remission (H)       nitroGLYcerin 0.4 MG sublingual tablet    NITROSTAT    25 tablet    For chest pain place 1 tablet under the tongue every 5 minutes for 3 doses. If symptoms persist 5 minutes after 1st dose call 911.    Atypical chest pain       * order for DME     1 Units    Dispense one 4 wheeled walker with hand brakes and seat    Multiple myeloma not having achieved remission (H)       * order for DME     1  Units    Wheelchair.    Need for assistance due to unsteady gait       prochlorperazine 10 MG tablet    COMPAZINE    30 tablet    Take 1 tablet (10 mg) by mouth every 6 hours as needed (Nausea/Vomiting)    Multiple myeloma not having achieved remission (H)       * Notice:  This list has 4 medication(s) that are the same as other medications prescribed for you. Read the directions carefully, and ask your doctor or other care provider to review them with you.

## 2018-01-15 NOTE — PROGRESS NOTES
Infusion Nursing Note:  Quiana Dunaway presents today for Velcade C7D15.    Patient seen by provider today: No   present during visit today: Not Applicable.    Note: N/A.    Intravenous Access:  Peripheral IV placed.    Treatment Conditions:  Lab Results   Component Value Date    HGB 12.1 01/15/2018     Lab Results   Component Value Date    WBC 6.0 01/15/2018      Lab Results   Component Value Date    ANEU 3.2 01/15/2018     Lab Results   Component Value Date     01/15/2018      Lab Results   Component Value Date     01/02/2018                   Lab Results   Component Value Date    POTASSIUM 3.4 01/02/2018           No results found for: MAG         Lab Results   Component Value Date    CR 0.56 01/15/2018                   Lab Results   Component Value Date    HUMBERTO 8.9 01/15/2018                Lab Results   Component Value Date    BILITOTAL 0.5 01/02/2018           Lab Results   Component Value Date    ALBUMIN 3.4 01/15/2018                    Lab Results   Component Value Date    ALT 21 01/02/2018           Lab Results   Component Value Date    AST 13 01/02/2018     Results reviewed, labs MET treatment parameters, ok to proceed with treatment.  Corrected calcium 9.38    Post Infusion Assessment:Patient tolerated infusion without incident.  Patient tolerated injection without incident.  Site patent and intact, free from redness, edema or discomfort.  No evidence of extravasations.  Access discontinued per protocol.    Discharge Plan:   Discharge instructions reviewed with: Patient.  Patient and/or family verbalized understanding of discharge instructions and all questions answered.  AVS to patient via GirltankT.  Patient will return 1/22/2018 for next appointment.   Patient discharged in stable condition accompanied by: self and daughter-in-law.  Departure Mode: Ambulatory.    Ammy Thomas RN

## 2018-01-18 ENCOUNTER — TELEPHONE (OUTPATIENT)
Dept: ONCOLOGY | Facility: CLINIC | Age: 83
End: 2018-01-18

## 2018-01-18 DIAGNOSIS — C90.00 MULTIPLE MYELOMA NOT HAVING ACHIEVED REMISSION (H): Primary | ICD-10-CM

## 2018-01-18 RX ORDER — ALBUTEROL SULFATE 0.83 MG/ML
2.5 SOLUTION RESPIRATORY (INHALATION)
Status: CANCELLED | OUTPATIENT
Start: 2018-01-29

## 2018-01-18 RX ORDER — EPINEPHRINE 1 MG/ML
0.3 INJECTION, SOLUTION INTRAMUSCULAR; SUBCUTANEOUS EVERY 5 MIN PRN
Status: CANCELLED | OUTPATIENT
Start: 2018-01-29

## 2018-01-18 RX ORDER — ALBUTEROL SULFATE 0.83 MG/ML
2.5 SOLUTION RESPIRATORY (INHALATION)
Status: CANCELLED | OUTPATIENT
Start: 2018-01-22

## 2018-01-18 RX ORDER — EPINEPHRINE 0.3 MG/.3ML
0.3 INJECTION SUBCUTANEOUS EVERY 5 MIN PRN
Status: CANCELLED | OUTPATIENT
Start: 2018-01-22

## 2018-01-18 RX ORDER — DIPHENHYDRAMINE HYDROCHLORIDE 50 MG/ML
50 INJECTION INTRAMUSCULAR; INTRAVENOUS
Status: CANCELLED
Start: 2018-01-29

## 2018-01-18 RX ORDER — METHYLPREDNISOLONE SODIUM SUCCINATE 125 MG/2ML
125 INJECTION, POWDER, LYOPHILIZED, FOR SOLUTION INTRAMUSCULAR; INTRAVENOUS
Status: CANCELLED
Start: 2018-01-22

## 2018-01-18 RX ORDER — DIPHENHYDRAMINE HYDROCHLORIDE 50 MG/ML
50 INJECTION INTRAMUSCULAR; INTRAVENOUS
Status: CANCELLED
Start: 2018-01-22

## 2018-01-18 RX ORDER — EPINEPHRINE 0.3 MG/.3ML
0.3 INJECTION SUBCUTANEOUS EVERY 5 MIN PRN
Status: CANCELLED | OUTPATIENT
Start: 2018-01-29

## 2018-01-18 RX ORDER — DIPHENHYDRAMINE HYDROCHLORIDE 50 MG/ML
50 INJECTION INTRAMUSCULAR; INTRAVENOUS
Status: CANCELLED
Start: 2018-02-05

## 2018-01-18 RX ORDER — LENALIDOMIDE 10 MG/1
10 CAPSULE ORAL DAILY
Qty: 14 CAPSULE | Refills: 0 | Status: SHIPPED | OUTPATIENT
Start: 2018-01-18 | End: 2018-12-26

## 2018-01-18 RX ORDER — METHYLPREDNISOLONE SODIUM SUCCINATE 125 MG/2ML
125 INJECTION, POWDER, LYOPHILIZED, FOR SOLUTION INTRAMUSCULAR; INTRAVENOUS
Status: CANCELLED
Start: 2018-02-05

## 2018-01-18 RX ORDER — ALBUTEROL SULFATE 90 UG/1
1-2 AEROSOL, METERED RESPIRATORY (INHALATION)
Status: CANCELLED
Start: 2018-01-29

## 2018-01-18 RX ORDER — ALBUTEROL SULFATE 0.83 MG/ML
2.5 SOLUTION RESPIRATORY (INHALATION)
Status: CANCELLED | OUTPATIENT
Start: 2018-02-05

## 2018-01-18 RX ORDER — SODIUM CHLORIDE 9 MG/ML
1000 INJECTION, SOLUTION INTRAVENOUS CONTINUOUS PRN
Status: CANCELLED
Start: 2018-01-29

## 2018-01-18 RX ORDER — MEPERIDINE HYDROCHLORIDE 25 MG/ML
25 INJECTION INTRAMUSCULAR; INTRAVENOUS; SUBCUTANEOUS EVERY 30 MIN PRN
Status: CANCELLED | OUTPATIENT
Start: 2018-02-05

## 2018-01-18 RX ORDER — ALBUTEROL SULFATE 90 UG/1
1-2 AEROSOL, METERED RESPIRATORY (INHALATION)
Status: CANCELLED
Start: 2018-02-05

## 2018-01-18 RX ORDER — LORAZEPAM 2 MG/ML
0.5 INJECTION INTRAMUSCULAR EVERY 4 HOURS PRN
Status: CANCELLED
Start: 2018-02-05

## 2018-01-18 RX ORDER — SODIUM CHLORIDE 9 MG/ML
1000 INJECTION, SOLUTION INTRAVENOUS CONTINUOUS PRN
Status: CANCELLED
Start: 2018-01-22

## 2018-01-18 RX ORDER — LORAZEPAM 2 MG/ML
0.5 INJECTION INTRAMUSCULAR EVERY 4 HOURS PRN
Status: CANCELLED
Start: 2018-01-22

## 2018-01-18 RX ORDER — METHYLPREDNISOLONE SODIUM SUCCINATE 125 MG/2ML
125 INJECTION, POWDER, LYOPHILIZED, FOR SOLUTION INTRAMUSCULAR; INTRAVENOUS
Status: CANCELLED
Start: 2018-01-29

## 2018-01-18 RX ORDER — MEPERIDINE HYDROCHLORIDE 25 MG/ML
25 INJECTION INTRAMUSCULAR; INTRAVENOUS; SUBCUTANEOUS EVERY 30 MIN PRN
Status: CANCELLED | OUTPATIENT
Start: 2018-01-22

## 2018-01-18 RX ORDER — EPINEPHRINE 1 MG/ML
0.3 INJECTION, SOLUTION INTRAMUSCULAR; SUBCUTANEOUS EVERY 5 MIN PRN
Status: CANCELLED | OUTPATIENT
Start: 2018-02-05

## 2018-01-18 RX ORDER — EPINEPHRINE 1 MG/ML
0.3 INJECTION, SOLUTION INTRAMUSCULAR; SUBCUTANEOUS EVERY 5 MIN PRN
Status: CANCELLED | OUTPATIENT
Start: 2018-01-22

## 2018-01-18 RX ORDER — DEXAMETHASONE 4 MG/1
40 TABLET ORAL
Qty: 30 TABLET | Refills: 0 | Status: SHIPPED | OUTPATIENT
Start: 2018-01-18 | End: 2018-02-02

## 2018-01-18 RX ORDER — MEPERIDINE HYDROCHLORIDE 25 MG/ML
25 INJECTION INTRAMUSCULAR; INTRAVENOUS; SUBCUTANEOUS EVERY 30 MIN PRN
Status: CANCELLED | OUTPATIENT
Start: 2018-01-29

## 2018-01-18 RX ORDER — ALBUTEROL SULFATE 90 UG/1
1-2 AEROSOL, METERED RESPIRATORY (INHALATION)
Status: CANCELLED
Start: 2018-01-22

## 2018-01-18 RX ORDER — EPINEPHRINE 0.3 MG/.3ML
0.3 INJECTION SUBCUTANEOUS EVERY 5 MIN PRN
Status: CANCELLED | OUTPATIENT
Start: 2018-02-05

## 2018-01-18 RX ORDER — LORAZEPAM 2 MG/ML
0.5 INJECTION INTRAMUSCULAR EVERY 4 HOURS PRN
Status: CANCELLED
Start: 2018-01-29

## 2018-01-18 RX ORDER — SODIUM CHLORIDE 9 MG/ML
1000 INJECTION, SOLUTION INTRAVENOUS CONTINUOUS PRN
Status: CANCELLED
Start: 2018-02-05

## 2018-01-22 ENCOUNTER — INFUSION THERAPY VISIT (OUTPATIENT)
Dept: INFUSION THERAPY | Facility: CLINIC | Age: 83
End: 2018-01-22
Attending: INTERNAL MEDICINE
Payer: MEDICARE

## 2018-01-22 ENCOUNTER — HOSPITAL ENCOUNTER (OUTPATIENT)
Facility: CLINIC | Age: 83
Setting detail: SPECIMEN
Discharge: HOME | End: 2018-01-22
Attending: INTERNAL MEDICINE | Admitting: INTERNAL MEDICINE
Payer: MEDICARE

## 2018-01-22 VITALS
BODY MASS INDEX: 30.07 KG/M2 | HEART RATE: 76 BPM | TEMPERATURE: 98.3 F | SYSTOLIC BLOOD PRESSURE: 144 MMHG | WEIGHT: 163.4 LBS | HEIGHT: 62 IN | DIASTOLIC BLOOD PRESSURE: 55 MMHG | RESPIRATION RATE: 16 BRPM

## 2018-01-22 DIAGNOSIS — E87.6 HYPOPOTASSEMIA: ICD-10-CM

## 2018-01-22 DIAGNOSIS — C90.00 MULTIPLE MYELOMA NOT HAVING ACHIEVED REMISSION (H): Primary | ICD-10-CM

## 2018-01-22 LAB
ALBUMIN SERPL-MCNC: 3.2 G/DL (ref 3.4–5)
ALP SERPL-CCNC: 89 U/L (ref 40–150)
ALT SERPL W P-5'-P-CCNC: 21 U/L (ref 0–50)
ANION GAP SERPL CALCULATED.3IONS-SCNC: 4 MMOL/L (ref 3–14)
AST SERPL W P-5'-P-CCNC: 17 U/L (ref 0–45)
BASOPHILS # BLD AUTO: 0 10E9/L (ref 0–0.2)
BASOPHILS NFR BLD AUTO: 0 %
BILIRUB SERPL-MCNC: 0.5 MG/DL (ref 0.2–1.3)
BUN SERPL-MCNC: 18 MG/DL (ref 7–30)
CALCIUM SERPL-MCNC: 8.2 MG/DL (ref 8.5–10.1)
CHLORIDE SERPL-SCNC: 106 MMOL/L (ref 94–109)
CO2 SERPL-SCNC: 33 MMOL/L (ref 20–32)
CREAT SERPL-MCNC: 0.59 MG/DL (ref 0.52–1.04)
DIFFERENTIAL METHOD BLD: ABNORMAL
ELLIPTOCYTES BLD QL SMEAR: SLIGHT
EOSINOPHIL # BLD AUTO: 0.1 10E9/L (ref 0–0.7)
EOSINOPHIL NFR BLD AUTO: 2 %
ERYTHROCYTE [DISTWIDTH] IN BLOOD BY AUTOMATED COUNT: 15.7 % (ref 10–15)
GFR SERPL CREATININE-BSD FRML MDRD: >90 ML/MIN/1.7M2
GLUCOSE SERPL-MCNC: 74 MG/DL (ref 70–99)
HCT VFR BLD AUTO: 35.5 % (ref 35–47)
HGB BLD-MCNC: 11.8 G/DL (ref 11.7–15.7)
LYMPHOCYTES # BLD AUTO: 0.9 10E9/L (ref 0.8–5.3)
LYMPHOCYTES NFR BLD AUTO: 26 %
MCH RBC QN AUTO: 28.5 PG (ref 26.5–33)
MCHC RBC AUTO-ENTMCNC: 33.2 G/DL (ref 31.5–36.5)
MCV RBC AUTO: 86 FL (ref 78–100)
MONOCYTES # BLD AUTO: 0.7 10E9/L (ref 0–1.3)
MONOCYTES NFR BLD AUTO: 21 %
NEUTROPHILS # BLD AUTO: 1.7 10E9/L (ref 1.6–8.3)
NEUTROPHILS NFR BLD AUTO: 51 %
PLATELET # BLD AUTO: 144 10E9/L (ref 150–450)
PLATELET # BLD EST: ABNORMAL 10*3/UL
POTASSIUM SERPL-SCNC: 3.1 MMOL/L (ref 3.4–5.3)
PROT SERPL-MCNC: 6.6 G/DL (ref 6.8–8.8)
RBC # BLD AUTO: 4.14 10E12/L (ref 3.8–5.2)
SODIUM SERPL-SCNC: 143 MMOL/L (ref 133–144)
WBC # BLD AUTO: 3.4 10E9/L (ref 4–11)

## 2018-01-22 PROCEDURE — 84165 PROTEIN E-PHORESIS SERUM: CPT | Performed by: INTERNAL MEDICINE

## 2018-01-22 PROCEDURE — 83883 ASSAY NEPHELOMETRY NOT SPEC: CPT | Performed by: INTERNAL MEDICINE

## 2018-01-22 PROCEDURE — 85025 COMPLETE CBC W/AUTO DIFF WBC: CPT | Performed by: INTERNAL MEDICINE

## 2018-01-22 PROCEDURE — A9270 NON-COVERED ITEM OR SERVICE: HCPCS | Mod: GY | Performed by: INTERNAL MEDICINE

## 2018-01-22 PROCEDURE — 82784 ASSAY IGA/IGD/IGG/IGM EACH: CPT | Performed by: INTERNAL MEDICINE

## 2018-01-22 PROCEDURE — 00000402 ZZHCL STATISTIC TOTAL PROTEIN: Performed by: INTERNAL MEDICINE

## 2018-01-22 PROCEDURE — 96401 CHEMO ANTI-NEOPL SQ/IM: CPT

## 2018-01-22 PROCEDURE — 25000128 H RX IP 250 OP 636: Mod: JW | Performed by: INTERNAL MEDICINE

## 2018-01-22 PROCEDURE — 25000132 ZZH RX MED GY IP 250 OP 250 PS 637: Mod: GY | Performed by: INTERNAL MEDICINE

## 2018-01-22 PROCEDURE — 80053 COMPREHEN METABOLIC PANEL: CPT | Performed by: INTERNAL MEDICINE

## 2018-01-22 RX ORDER — POTASSIUM CHLORIDE 750 MG/1
20 TABLET, EXTENDED RELEASE ORAL ONCE
Status: COMPLETED | OUTPATIENT
Start: 2018-01-22 | End: 2018-01-22

## 2018-01-22 RX ORDER — POTASSIUM CHLORIDE 750 MG/1
20 TABLET, EXTENDED RELEASE ORAL ONCE
Status: DISCONTINUED | OUTPATIENT
Start: 2018-01-22 | End: 2018-01-22 | Stop reason: HOSPADM

## 2018-01-22 RX ADMIN — POTASSIUM CHLORIDE 20 MEQ: 750 TABLET, FILM COATED, EXTENDED RELEASE ORAL at 15:00

## 2018-01-22 RX ADMIN — BORTEZOMIB 2.7 MG: 3.5 INJECTION, POWDER, LYOPHILIZED, FOR SOLUTION INTRAVENOUS; SUBCUTANEOUS at 15:02

## 2018-01-22 ASSESSMENT — PAIN SCALES - GENERAL: PAINLEVEL: NO PAIN (0)

## 2018-01-22 NOTE — MR AVS SNAPSHOT
After Visit Summary   1/22/2018    Quiana Dunaway    MRN: [id number]           Patient Information     Date Of Birth          1935        Visit Information        Provider Department      1/22/2018 2:00 PM SH INFUSION CHAIR 18 [facility]        Today's Diagnoses     Multiple myeloma not having achieved remission (H)    -  1    Hypopotassemia           Follow-ups after your visit        Your next 10 appointments already scheduled     Jan 29, 2018  2:00 PM CST   Level 2 with SH INFUSION CHAIR 5   [facility] [place] ([facility])    [facility]  [address]  [place] [address]   [phone]            Feb 05, 2018  2:00 PM CST   Level 2 with SH INFUSION CHAIR 4   [facility] [place] ([facility])    [facility]  [address]  [place] [address]   [phone]            Feb 05, 2018  2:30 PM CST   Return Visit with Alex Parry MD   [facility] [place] [facility] ([facility])    [facility]  [address]  [place] [address]   [phone]            Feb 12, 2018  2:00 PM CST   Level 2 with SH INFUSION CHAIR 3   [facility] [place] [facility] ([facility])    [facility]  [address]  [place] [address]   [phone]            Feb 19, 2018  2:00 PM CST   Level 2 with SH INFUSION CHAIR 2   [facility] ([facility])    [facility]  [address]  [place] [address]   [phone]            Mar 05, 2018  2:00 PM CST   Level 2 with SH INFUSION CHAIR 5   [facility] [place] ([facility])    [facility]  [address]  [place] [address]   [phone]            Mar  12, 2018  2:00 PM CDT   Level 2 with SH INFUSION CHAIR 7   [facility] ([facility])    [facility] [place]  [address]  [place] [address]   [phone]            Mar 19, 2018  2:00 PM CDT   Level 2 with SH INFUSION CHAIR 5   [facility] ([facility])    [facility] [place]  [address]  [place] [address]   [phone]            Mar 26, 2018  2:00 PM CDT   Level 2 with SH INFUSION CHAIR 9   [facility] ([facility])    [facility] [place]  [address]  [place] [address] [phone]            Apr 09, 2018  2:30 PM CDT   Remote PPM Check with RIOS TECH1   [facility]   [place] ([facility])    [address]  [place] [address] [phone]           This appointment is for a remote check of your pacemaker.  This is not an appointment at the office.              Future tests that were ordered for you today     Open Standing Orders        Priority Remaining Interval Expires Ordered    Protein electrophoresis Routine 1/1 AM DRAW  1/22/2018    IgG Routine 1/1 AM DRAW  1/22/2018    Kappa and lambda light chain Routine 1/1 AM DRAW  1/22/2018            Who to contact     If you have questions or need follow up information about today's clinic visit or your schedule please contact [facility] directly at [phone].  Normal or non-critical lab and imaging results will be communicated to you by MyChart, letter or phone within 4 business days after the clinic has received the results. If you do not hear from us within 7 days, please contact the clinic through MyChart or phone. If you have a critical or abnormal lab result, we will notify you by phone as soon as possible.  Submit refill requests through Data Maid  "or call your pharmacy and they will forward the refill request to us. Please allow 3 business days for your refill to be completed.          Additional Information About Your Visit        MyChart Information     [employer] [facility] [employer] [facility] gives you secure access to your electronic health record. If you see a primary care provider, you can also send messages to your care team and make appointments. If you have questions, please call your primary care clinic.  If you do not have a primary care provider, please call [phone] and they will assist you.        Care EveryWhere ID     This is your Care EveryWhere ID. This could be used by other organizations to access your [place] medical records  [id number]        Your Vitals Were     Pulse Temperature Respirations Height BMI (Body Mass Index)       76 98.3  F (36.8  C) (Oral) 16 1.575 m (5' 2.01\") 29.88 kg/m2        Blood Pressure from Last 3 Encounters:   01/22/18 144/55   01/15/18 142/72   01/08/18 135/55    Weight from Last 3 Encounters:   01/22/18 74.1 kg (163 lb 6.4 oz)   01/15/18 74.8 kg (165 lb)   01/08/18 73 kg (161 lb)              We Performed the Following     CBC with platelets differential     Comprehensive metabolic panel     IgG     Kappa and lambda light chain     Protein electrophoresis        Primary Care Provider Office Phone # Fax #    César Chung [phone]       [facility] [address]  [facility] [address]        Equal Access to Services     LILLIANA SANDOVAL : Hadii aad ku hadasho Soomaali, waaxda luqadaha, qaybta kaalmada adeegyada, waxay liana gee adedenzel rodriguez . So [facility] [phone].    ATENCIÓN: Si habla español, tiene a contreras disposición servicios gratuitos de asistencia lingüística. Lllonny al [phone].    We comply with applicable federal civil rights laws and [place] laws. We do not discriminate on the basis of race, color, national origin, age, disability, sex, sexual orientation, or gender " identity.            Thank you!     Thank you for choosing [facility]  for your care. Our goal is always to provide you with excellent care. Hearing back from our patients is one way we can continue to improve our services. Please take a few minutes to complete the written survey that you may receive in the mail after your visit with us. Thank you!             Your Updated Medication List - Protect others around you: Learn how to safely use, store and throw away your medicines at www.disposemymeds.org.          This list is accurate as of: 1/22/18  3:07 PM.  Always use your most recent med list.                   Brand Name Dispense Instructions for use Diagnosis    ACYCLOVIR PO      Take 400 mg by mouth 2 times daily        aspirin 81 MG chewable tablet      Take 81 mg by mouth daily        calcium carbonate-vitamin D 600-400 MG-UNIT Chew     180 tablet    Take 1 chew tab by mouth 2 times daily    Multiple myeloma not having achieved remission (H)       * dexamethasone 4 MG tablet    DECADRON    30 tablet    Take 10 tablets (40 mg) by mouth every 7 days Days 1, 8, and 15.    Multiple myeloma not having achieved remission (H)       * dexamethasone 4 MG tablet    DECADRON    30 tablet    Take 10 tablets (40 mg) by mouth every 7 days for 3 doses Days 1, 8, and 15.    Multiple myeloma not having achieved remission (H)       escitalopram 5 MG tablet    LEXAPRO    90 tablet    TAKE 1 TABLET BY MOUTH DAILY    ROBER (generalized anxiety disorder)       LENalidomide 10 MG Caps capsule CHEMO    REVLIMID    14 capsule    Take 1 capsule (10 mg) by mouth daily for 14 days Days 1 through 14.    Multiple myeloma not having achieved remission (H)       lisinopril 10 MG tablet    PRINIVIL/ZESTRIL    90 tablet    Take 1 tablet (10 mg) by mouth daily    Benign essential hypertension       LORazepam 0.5 MG tablet    ATIVAN    10 tablet    Take 1 tablet (0.5 mg) by mouth every 8 hours as needed (Anxiety,  Nausea/Vomiting or Sleep)    Multiple myeloma not having achieved remission (H)       nitroGLYcerin 0.4 MG sublingual tablet    NITROSTAT    25 tablet    For chest pain place 1 tablet under the tongue every 5 minutes for 3 doses. If symptoms persist 5 minutes after 1st dose call 911.    Atypical chest pain       * order for DME     1 Units    Dispense one 4 wheeled walker with hand brakes and seat    Multiple myeloma not having achieved remission (H)       * order for DME     1 Units    Wheelchair.    Need for assistance due to unsteady gait       prochlorperazine 10 MG tablet    COMPAZINE    30 tablet    Take 1 tablet (10 mg) by mouth every 6 hours as needed (Nausea/Vomiting)    Multiple myeloma not having achieved remission (H)       * Notice:  This list has 4 medication(s) that are the same as other medications prescribed for you. Read the directions carefully, and ask your doctor or other care provider to review them with you.

## 2018-01-22 NOTE — PROGRESS NOTES
Oral Chemotherapy Monitoring Program.     Patient currently on revlimid therapy.     Reviewed labs from 1/22/18     No concerning abnormalities.     Questions answered to patient's satisfaction.     Will follow up in 3 weeks with repeat labs.    Martinez Gallardo, PharmD, BCOP  January 22, 2018

## 2018-01-22 NOTE — PROGRESS NOTES
Infusion Nursing Note:  Quiana Dunaway presents today for Velcade C8D1.    Patient seen by provider today: No   present during visit today: Not Applicable.    Note: N/A.    Intravenous Access:  Lab draw site left ac, Needle type BF, Gauge 23.  Labs drawn without difficulty.    Treatment Conditions:  Lab Results   Component Value Date    HGB 11.8 01/22/2018     Lab Results   Component Value Date    WBC 3.4 01/22/2018      Lab Results   Component Value Date    ANEU 1.7 01/22/2018     Lab Results   Component Value Date     01/22/2018      Lab Results   Component Value Date     01/22/2018                   Lab Results   Component Value Date    POTASSIUM 3.1 01/22/2018           No results found for: MAG         Lab Results   Component Value Date    CR 0.59 01/22/2018                   Lab Results   Component Value Date    HUMBERTO 8.2 01/22/2018                Lab Results   Component Value Date    BILITOTAL 0.5 01/22/2018           Lab Results   Component Value Date    ALBUMIN 3.2 01/22/2018                    Lab Results   Component Value Date    ALT 21 01/22/2018           Lab Results   Component Value Date    AST 17 01/22/2018     Results reviewed, labs MET treatment parameters, ok to proceed with treatment.    K 3.1--Per Dr. Parry, replace orally.  Patient to take 20meq now and another 20meq in 4 hours.  Pt verbalizes understanding and agrees with plan. .        Post Infusion Assessment:  Patient tolerated injection without incident.  Site patent and intact, free from redness, edema or discomfort.  No evidence of extravasations.  Access discontinued per protocol.    Discharge Plan:   Discharge instructions reviewed with: Patient and Family.  Patient and/or family verbalized understanding of discharge instructions and all questions answered.  AVS to patient via [employer].  Patient will return 1/29/18 for next appointment.   Patient discharged in stable condition accompanied by: daughter.  Departure  Mode: Ambulatory.    FAUSTINO Wei RN

## 2018-01-23 LAB
ALBUMIN SERPL ELPH-MCNC: 3.7 G/DL (ref 3.7–5.1)
ALPHA1 GLOB SERPL ELPH-MCNC: 0.4 G/DL (ref 0.2–0.4)
ALPHA2 GLOB SERPL ELPH-MCNC: 0.9 G/DL (ref 0.5–0.9)
B-GLOBULIN SERPL ELPH-MCNC: 0.7 G/DL (ref 0.6–1)
GAMMA GLOB SERPL ELPH-MCNC: 0.5 G/DL (ref 0.7–1.6)
IGG SERPL-MCNC: 495 MG/DL (ref 695–1620)
KAPPA LC UR-MCNC: 2.57 MG/DL (ref 0.33–1.94)
KAPPA LC/LAMBDA SER: 2.82 {RATIO} (ref 0.26–1.65)
LAMBDA LC SERPL-MCNC: 0.91 MG/DL (ref 0.57–2.63)
M PROTEIN SERPL ELPH-MCNC: 0.3 G/DL
PROT PATTERN SERPL ELPH-IMP: ABNORMAL

## 2018-01-29 ENCOUNTER — INFUSION THERAPY VISIT (OUTPATIENT)
Dept: INFUSION THERAPY | Facility: CLINIC | Age: 83
End: 2018-01-29
Attending: INTERNAL MEDICINE
Payer: MEDICARE

## 2018-01-29 ENCOUNTER — HOSPITAL ENCOUNTER (OUTPATIENT)
Facility: CLINIC | Age: 83
Setting detail: SPECIMEN
Discharge: HOME | End: 2018-01-29
Attending: INTERNAL MEDICINE | Admitting: INTERNAL MEDICINE
Payer: MEDICARE

## 2018-01-29 VITALS
RESPIRATION RATE: 16 BRPM | OXYGEN SATURATION: 93 % | HEIGHT: 62 IN | BODY MASS INDEX: 30 KG/M2 | SYSTOLIC BLOOD PRESSURE: 143 MMHG | DIASTOLIC BLOOD PRESSURE: 63 MMHG | TEMPERATURE: 99 F | HEART RATE: 88 BPM | WEIGHT: 163 LBS

## 2018-01-29 DIAGNOSIS — C90.00 MULTIPLE MYELOMA NOT HAVING ACHIEVED REMISSION (H): Primary | ICD-10-CM

## 2018-01-29 LAB
BASOPHILS # BLD AUTO: 0 10E9/L (ref 0–0.2)
BASOPHILS NFR BLD AUTO: 0.2 %
DIFFERENTIAL METHOD BLD: ABNORMAL
EOSINOPHIL # BLD AUTO: 0.3 10E9/L (ref 0–0.7)
EOSINOPHIL NFR BLD AUTO: 4.2 %
ERYTHROCYTE [DISTWIDTH] IN BLOOD BY AUTOMATED COUNT: 15.3 % (ref 10–15)
HCT VFR BLD AUTO: 36.7 % (ref 35–47)
HGB BLD-MCNC: 12.3 G/DL (ref 11.7–15.7)
IMM GRANULOCYTES # BLD: 0.1 10E9/L (ref 0–0.4)
IMM GRANULOCYTES NFR BLD: 1.2 %
LYMPHOCYTES # BLD AUTO: 1.3 10E9/L (ref 0.8–5.3)
LYMPHOCYTES NFR BLD AUTO: 21.5 %
MCH RBC QN AUTO: 28.5 PG (ref 26.5–33)
MCHC RBC AUTO-ENTMCNC: 33.5 G/DL (ref 31.5–36.5)
MCV RBC AUTO: 85 FL (ref 78–100)
MONOCYTES # BLD AUTO: 0.7 10E9/L (ref 0–1.3)
MONOCYTES NFR BLD AUTO: 11.5 %
NEUTROPHILS # BLD AUTO: 3.6 10E9/L (ref 1.6–8.3)
NEUTROPHILS NFR BLD AUTO: 61.4 %
NRBC # BLD AUTO: 0 10*3/UL
NRBC BLD AUTO-RTO: 0 /100
PLATELET # BLD AUTO: 185 10E9/L (ref 150–450)
RBC # BLD AUTO: 4.31 10E12/L (ref 3.8–5.2)
WBC # BLD AUTO: 5.9 10E9/L (ref 4–11)

## 2018-01-29 PROCEDURE — 96401 CHEMO ANTI-NEOPL SQ/IM: CPT

## 2018-01-29 PROCEDURE — 25000128 H RX IP 250 OP 636: Mod: JW | Performed by: INTERNAL MEDICINE

## 2018-01-29 PROCEDURE — 85025 COMPLETE CBC W/AUTO DIFF WBC: CPT | Performed by: INTERNAL MEDICINE

## 2018-01-29 RX ADMIN — BORTEZOMIB 2.7 MG: 3.5 INJECTION, POWDER, LYOPHILIZED, FOR SOLUTION INTRAVENOUS; SUBCUTANEOUS at 15:06

## 2018-01-29 ASSESSMENT — PAIN SCALES - GENERAL: PAINLEVEL: NO PAIN (0)

## 2018-01-29 NOTE — MR AVS SNAPSHOT
After Visit Summary   1/29/2018    Quiana Dunaway    MRN: [id number]           Patient Information     Date Of Birth          1935        Visit Information        Provider Department      1/29/2018 2:00 PM SH INFUSION CHAIR 11 [facility]        Today's Diagnoses     Multiple myeloma not having achieved remission (H)    -  1       Follow-ups after your visit        Your next 10 appointments already scheduled     Feb 05, 2018  2:00 PM CST   Level 2 with SH INFUSION CHAIR 4   [facility] ([facility])    [place] [id number]  [place] [address]   [phone]            Feb 05, 2018  2:30 PM CST   Return Visit with Alex Parry MD   [facility] ([facility])    On license of [facility]  [address]  [place] [address]   [phone]            Feb 12, 2018  2:00 PM CST   Level 2 with SH INFUSION CHAIR 3   [facility] ([facility])    [facility] [place] [address]  [place] [address]   [phone]            Feb 19, 2018  2:00 PM CST   Level 2 with SH INFUSION CHAIR 2   [facility] ([facility])    [facility]  [address]  [place] [address]   [phone]            Mar 05, 2018  2:00 PM CST   Level 2 with SH INFUSION CHAIR 13   [facility] ([facility])    On license of [facility]  [address]  [place] [address]   [phone]            Mar 12, 2018  2:00 PM CDT   Level 2 with SH INFUSION CHAIR 9   [facility] ([facility])    On license of [facility]  [address]  [place] [address]   [phone]            Mar 19, 2018  2:00 PM CDT    Level 2 with  INFUSION CHAIR 5   [facility] ([facility])    [facility] [place]  [address]  [place] [address]   [phone]            Mar 26, 2018  2:00 PM CDT   Level 2 with  INFUSION CHAIR 9   [facility] ([facility])    [facility] [place]  [address]  [place] [address] [phone]            Apr 09, 2018  2:30 PM CDT   Remote PPM Check with RIOS TECH1   [facility] ([facility])    [address]  [place] [address] [phone]           This appointment is for a remote check of your pacemaker.  This is not an appointment at the office.            Apr 09, 2018  3:15 PM CDT   [facility] EP RETURN with Alberto Worthington MD   [facility] ([facility])    [address]  [facility] [address] [phone]              Who to contact     If you have questions or need follow up information about today's clinic visit or your schedule please contact [facility] directly at [phone].  Normal or non-critical lab and imaging results will be communicated to you by HackHandshart, letter or phone within 4 business days after the clinic has received the results. If you do not hear from us within 7 days, please contact the clinic through HackHandshart or phone. If you have a critical or abnormal lab result, we will notify you by phone as soon as possible.  Submit refill requests through [employer] or call your pharmacy and they will forward the refill request to us. Please allow 3 business days for your refill to be completed.          Additional Information About Your Visit        [employer] Information     [employer] gives you secure access to your electronic health record. If you see a primary care provider, you can also  "send messages to your care team and make appointments. If you have questions, please call your primary care clinic.  If you do not have a primary care provider, please call [phone] and they will assist you.        Care EveryWhere ID     This is your Care EveryWhere ID. This could be used by other organizations to access your [place] medical records  [id number]        Your Vitals Were     Pulse Temperature Respirations Height Pulse Oximetry BMI (Body Mass Index)    88 99  F (37.2  C) (Oral) 16 1.575 m (5' 2\") 93% 29.81 kg/m2       Blood Pressure from Last 3 Encounters:   01/29/18 143/63   01/22/18 144/55   01/15/18 142/72    Weight from Last 3 Encounters:   01/29/18 73.9 kg (163 lb)   01/22/18 74.1 kg (163 lb 6.4 oz)   01/15/18 74.8 kg (165 lb)              We Performed the Following     CBC with platelets differential        Primary Care Provider Office Phone # Fax #    César Chung [phone]       [facility] [address]  [place] [address]        Equal Access to Services     [facility] Center[facility] : Hadii aad ku hadasho Soterrieali, waaxda luqadaha, qaybta kaalmada adeegyada, phoenix rodriguez . So [facility] [phone].    ATENCIÓN: Si habla español, tiene a contreras disposición servicios gratuitos de asistencia lingüística. [facility] [phone].    We comply with applicable federal civil rights laws and [place] laws. We do not discriminate on the basis of race, color, national origin, age, disability, sex, sexual orientation, or gender identity.            Thank you!     Thank you for choosing [facility]  for your care. Our goal is always to provide you with excellent care. Hearing back from our patients is one way we can continue to improve our services. Please take a few minutes to complete the written survey that you may receive in the mail after your visit with us. Thank you!             Your Updated Medication List - " Protect others around you: Learn how to safely use, store and throw away your medicines at www.disposemymeds.org.          This list is accurate as of 1/29/18  3:11 PM.  Always use your most recent med list.                   Brand Name Dispense Instructions for use Diagnosis    ACYCLOVIR PO      Take 400 mg by mouth 2 times daily        aspirin 81 MG chewable tablet      Take 81 mg by mouth daily        calcium carbonate-vitamin D 600-400 MG-UNIT Chew     180 tablet    Take 1 chew tab by mouth 2 times daily    Multiple myeloma not having achieved remission (H)       * dexamethasone 4 MG tablet    DECADRON    30 tablet    Take 10 tablets (40 mg) by mouth every 7 days Days 1, 8, and 15.    Multiple myeloma not having achieved remission (H)       * dexamethasone 4 MG tablet    DECADRON    30 tablet    Take 10 tablets (40 mg) by mouth every 7 days for 3 doses Days 1, 8, and 15.    Multiple myeloma not having achieved remission (H)       escitalopram 5 MG tablet    LEXAPRO    90 tablet    TAKE 1 TABLET BY MOUTH DAILY    ROBRE (generalized anxiety disorder)       IMODIUM A-D PO      Take by mouth as needed        LENalidomide 10 MG Caps capsule CHEMO    REVLIMID    14 capsule    Take 1 capsule (10 mg) by mouth daily for 14 days Days 1 through 14.    Multiple myeloma not having achieved remission (H)       lisinopril 10 MG tablet    PRINIVIL/ZESTRIL    90 tablet    Take 1 tablet (10 mg) by mouth daily    Benign essential hypertension       LORazepam 0.5 MG tablet    ATIVAN    10 tablet    Take 1 tablet (0.5 mg) by mouth every 8 hours as needed (Anxiety, Nausea/Vomiting or Sleep)    Multiple myeloma not having achieved remission (H)       nitroGLYcerin 0.4 MG sublingual tablet    NITROSTAT    25 tablet    For chest pain place 1 tablet under the tongue every 5 minutes for 3 doses. If symptoms persist 5 minutes after 1st dose call 911.    Atypical chest pain       * order for DME     1 Units    Dispense one 4 wheeled walker  with hand brakes and seat    Multiple myeloma not having achieved remission (H)       * order for DME     1 Units    Wheelchair.    Need for assistance due to unsteady gait       prochlorperazine 10 MG tablet    COMPAZINE    30 tablet    Take 1 tablet (10 mg) by mouth every 6 hours as needed (Nausea/Vomiting)    Multiple myeloma not having achieved remission (H)       * Notice:  This list has 4 medication(s) that are the same as other medications prescribed for you. Read the directions carefully, and ask your doctor or other care provider to review them with you.

## 2018-01-29 NOTE — PROGRESS NOTES
Infusion Nursing Note:  Quiana Dunaway presents today for labs, [id number].    Patient seen by provider today: No   present during visit today: Not Applicable.    Note: Did not feel well last week, but says she feels good today. Had some diarrhea, N/V, generalized aches.    Intravenous Access:  Lab draw site left AC, Needle type butterfly, Gauge 23.  Labs drawn without difficulty.    Treatment Conditions:  Lab Results   Component Value Date    HGB 12.3 01/29/2018     Lab Results   Component Value Date    WBC 5.9 01/29/2018      Lab Results   Component Value Date    ANEU 3.6 01/29/2018     Lab Results   Component Value Date     01/29/2018      Results reviewed, labs MET treatment parameters, ok to proceed with treatment.      Post Infusion Assessment:  Patient tolerated injection without incident.    Discharge Plan:   Discharge instructions reviewed with: Patient.  Patient and/or family verbalized understanding of discharge instructions and all questions answered.  Copy of AVS reviewed with patient and/or family.  Patient will return 2/5 for next appointment.  Patient discharged in stable condition accompanied by: self.  Departure Mode: Ambulatory.    Cortney Cruz RN

## 2018-02-05 ENCOUNTER — ONCOLOGY VISIT (OUTPATIENT)
Dept: ONCOLOGY | Facility: CLINIC | Age: 83
End: 2018-02-05
Attending: INTERNAL MEDICINE
Payer: MEDICARE

## 2018-02-05 ENCOUNTER — INFUSION THERAPY VISIT (OUTPATIENT)
Dept: INFUSION THERAPY | Facility: CLINIC | Age: 83
End: 2018-02-05
Attending: INTERNAL MEDICINE
Payer: MEDICARE

## 2018-02-05 ENCOUNTER — HOSPITAL ENCOUNTER (OUTPATIENT)
Facility: CLINIC | Age: 83
Setting detail: SPECIMEN
Discharge: HOME | End: 2018-02-05
Attending: INTERNAL MEDICINE | Admitting: INTERNAL MEDICINE
Payer: MEDICARE

## 2018-02-05 VITALS
BODY MASS INDEX: 30.14 KG/M2 | HEIGHT: 62 IN | WEIGHT: 163.8 LBS | RESPIRATION RATE: 16 BRPM | HEART RATE: 65 BPM | SYSTOLIC BLOOD PRESSURE: 154 MMHG | DIASTOLIC BLOOD PRESSURE: 72 MMHG | TEMPERATURE: 98 F

## 2018-02-05 VITALS
HEART RATE: 65 BPM | DIASTOLIC BLOOD PRESSURE: 72 MMHG | TEMPERATURE: 98 F | RESPIRATION RATE: 16 BRPM | SYSTOLIC BLOOD PRESSURE: 154 MMHG | WEIGHT: 163.8 LBS | HEIGHT: 62 IN | BODY MASS INDEX: 30.14 KG/M2

## 2018-02-05 DIAGNOSIS — C90.00 MULTIPLE MYELOMA NOT HAVING ACHIEVED REMISSION (H): Primary | ICD-10-CM

## 2018-02-05 DIAGNOSIS — R35.0 URINARY FREQUENCY: ICD-10-CM

## 2018-02-05 LAB
ALBUMIN UR-MCNC: 10 MG/DL
APPEARANCE UR: CLEAR
BASOPHILS # BLD AUTO: 0 10E9/L (ref 0–0.2)
BASOPHILS NFR BLD AUTO: 0 %
BILIRUB UR QL STRIP: NEGATIVE
COLOR UR AUTO: YELLOW
DIFFERENTIAL METHOD BLD: ABNORMAL
ELLIPTOCYTES BLD QL SMEAR: SLIGHT
EOSINOPHIL # BLD AUTO: 0.2 10E9/L (ref 0–0.7)
EOSINOPHIL NFR BLD AUTO: 4 %
ERYTHROCYTE [DISTWIDTH] IN BLOOD BY AUTOMATED COUNT: 15.5 % (ref 10–15)
GLUCOSE UR STRIP-MCNC: NEGATIVE MG/DL
HCT VFR BLD AUTO: 37.1 % (ref 35–47)
HGB BLD-MCNC: 12.6 G/DL (ref 11.7–15.7)
HGB UR QL STRIP: NEGATIVE
KETONES UR STRIP-MCNC: NEGATIVE MG/DL
LEUKOCYTE ESTERASE UR QL STRIP: ABNORMAL
LYMPHOCYTES # BLD AUTO: 1 10E9/L (ref 0.8–5.3)
LYMPHOCYTES NFR BLD AUTO: 21 %
MCH RBC QN AUTO: 28.9 PG (ref 26.5–33)
MCHC RBC AUTO-ENTMCNC: 34 G/DL (ref 31.5–36.5)
MCV RBC AUTO: 85 FL (ref 78–100)
METAMYELOCYTES # BLD: 0 10E9/L
METAMYELOCYTES NFR BLD MANUAL: 1 %
MONOCYTES # BLD AUTO: 0.5 10E9/L (ref 0–1.3)
MONOCYTES NFR BLD AUTO: 11 %
MUCOUS THREADS #/AREA URNS LPF: PRESENT /LPF
NEUTROPHILS # BLD AUTO: 3 10E9/L (ref 1.6–8.3)
NEUTROPHILS NFR BLD AUTO: 63 %
NITRATE UR QL: NEGATIVE
OVALOCYTES BLD QL SMEAR: SLIGHT
PH UR STRIP: 5.5 PH (ref 5–7)
PLATELET # BLD AUTO: 188 10E9/L (ref 150–450)
PLATELET # BLD EST: ABNORMAL 10*3/UL
RBC # BLD AUTO: 4.36 10E12/L (ref 3.8–5.2)
RBC #/AREA URNS AUTO: 1 /HPF (ref 0–2)
SOURCE: ABNORMAL
SP GR UR STRIP: 1.02 (ref 1–1.03)
SQUAMOUS #/AREA URNS AUTO: 3 /HPF (ref 0–1)
TRANS CELLS #/AREA URNS HPF: 1 /HPF (ref 0–1)
UROBILINOGEN UR STRIP-MCNC: 2 MG/DL (ref 0–2)
VARIANT LYMPHS BLD QL SMEAR: PRESENT
WBC # BLD AUTO: 4.7 10E9/L (ref 4–11)
WBC #/AREA URNS AUTO: 2 /HPF (ref 0–2)

## 2018-02-05 PROCEDURE — 81001 URINALYSIS AUTO W/SCOPE: CPT | Performed by: INTERNAL MEDICINE

## 2018-02-05 PROCEDURE — 36415 COLL VENOUS BLD VENIPUNCTURE: CPT

## 2018-02-05 PROCEDURE — G0463 HOSPITAL OUTPT CLINIC VISIT: HCPCS | Mod: 25

## 2018-02-05 PROCEDURE — 25000128 H RX IP 250 OP 636: Performed by: INTERNAL MEDICINE

## 2018-02-05 PROCEDURE — 99214 OFFICE O/P EST MOD 30 MIN: CPT | Performed by: INTERNAL MEDICINE

## 2018-02-05 PROCEDURE — 85025 COMPLETE CBC W/AUTO DIFF WBC: CPT | Performed by: INTERNAL MEDICINE

## 2018-02-05 PROCEDURE — 96401 CHEMO ANTI-NEOPL SQ/IM: CPT

## 2018-02-05 RX ORDER — CIPROFLOXACIN 250 MG/1
250 TABLET, FILM COATED ORAL 2 TIMES DAILY
Qty: 10 TABLET | Refills: 0 | Status: SHIPPED | OUTPATIENT
Start: 2018-02-05 | End: 2018-02-10

## 2018-02-05 RX ADMIN — BORTEZOMIB 2.7 MG: 3.5 INJECTION, POWDER, LYOPHILIZED, FOR SOLUTION INTRAVENOUS; SUBCUTANEOUS at 15:50

## 2018-02-05 NOTE — LETTER
"    2/5/2018         RE: Quiana Dunaway  [address] [facility] [address]        Dear Colleague,    Thank you for referring your patient, Quiana Dunaway, to the [facility]. Please see a copy of my visit note below.    Oncology Rooming Note    February 5, 2018 2:20 PM   Quiana Dunaway is a 82 year old female who presents for:    Chief Complaint   Patient presents with     Oncology Clinic Visit     Initial Vitals: /72  Pulse 65  Temp 98  F (36.7  C) (Oral)  Resp 16  Ht 1.575 m (5' 2.01\")  Wt 74.3 kg (163 lb 12.8 oz)  BMI 29.95 kg/m2 Estimated body mass index is 29.95 kg/(m^2) as calculated from the following:    Height as of this encounter: 1.575 m (5' 2.01\").    Weight as of this encounter: 74.3 kg (163 lb 12.8 oz). Body surface area is 1.8 meters squared.  Data Unavailable Comment: Data Unavailable   No LMP recorded. Patient is postmenopausal.  Allergies reviewed: Yes  Medications reviewed: Yes    Medications: Medication refills not needed today.  Pharmacy name entered into Divesquare:    [id number] [place], [place] - [address] [facility] [address]  [facility] [place], [place] - [address]  [facility] - [place], [place] - [address]    Clinical concerns: None     5 minutes for nursing intake (face to face time)     Bonny Sharif [facility]                Again, thank you for allowing me to participate in the care of your patient.        Sincerely,        Alex Parry MD    "

## 2018-02-05 NOTE — MR AVS SNAPSHOT
After Visit Summary   2/5/2018    Quiana Dunaway    MRN: [id number]           Patient Information     Date Of Birth          1935        Visit Information        Provider Department      2/5/2018 2:30 PM Alex Parry MD [facility]        Today's Diagnoses     Multiple myeloma not having achieved remission (H)    -  1    Urinary frequency          Care Instructions    UA.  Urology consult for urinary frequency.  Ciproflox twice a day for 5 days.  Continue chemotherapy.  Follow up in 1 month.          Follow-ups after your visit        Your next 10 appointments already scheduled     Feb 12, 2018 12:30 PM CST   Level 2 with SH INFUSION CHAIR 20   [facility] ([facility])    [facility]  [address]  [place] [address]   [phone]            Feb 19, 2018  1:30 PM CST   New Patient Visit with Leona Rodriguez PA-C   [facility] [place] ([facility] [place])    [address]  [place] [address] [phone]            Feb 19, 2018  2:00 PM CST   Level 2 with SH INFUSION CHAIR 2   [facility] ([facility])    [facility]  [address]  [place] [address]   [phone]            Mar 05, 2018  2:00 PM CST   Level 2 with SH INFUSION CHAIR 13   [facility] ([facility])    [facility]  [address]  [place] [address]   [phone]            Mar 05, 2018  2:30 PM CST   Return Visit with Alex Parry MD   [facility])    [facility]  [address]  [place] [address]   [phone]            Mar 12, 2018  2:00 PM CDT   Level 2 with SH INFUSION CHAIR 9   [facility] ([facility])    [facility]  [address]  [place] [address]   [phone]            Mar 19, 2018  2:00 PM CDT   Level 2 with SH INFUSION CHAIR 5   [facility] ([facility])    [facility] [place]  [address]  [place] [address]   [phone]            Mar 26, 2018  2:00 PM CDT   Level 2 with SH INFUSION CHAIR 9   [facility] ([facility])    [facility] [place]  [address]  [place] [address]   [phone]            Apr 09, 2018  2:30 PM CDT   Remote PPM Check with RIOS TECH1   [facility] ([facility])    [address]  [facility] [address] [phone]           This appointment is for a remote check of your pacemaker.  This is not an appointment at the office.            Apr 09, 2018  3:15 PM CDT   [facility] EP RETURN with Alberto Worthington MD   [facility] ([facility])    [address]  [facility] [address] [phone]              Who to contact     If you have questions or need follow up information about today's clinic visit or your schedule please contact [facility] directly at [phone].  Normal or non-critical lab and imaging results will be communicated to you by Fielding Systemshart, letter or phone within 4 business days after the clinic has received the results. If you do not hear from us within 7 days, please contact the clinic through CrowdCurityt or phone. If you have a critical or abnormal lab result, we will notify you by phone as soon as possible.  Submit refill requests through Kivivi or call your pharmacy and they will forward the refill request to us. Please allow 3 business days for your refill to be completed.          Additional Information About Your Visit        [facility] HospitalTwilio  "Information     Paige gives you secure access to your electronic health record. If you see a primary care provider, you can also send messages to your care team and make appointments. If you have questions, please call your primary care clinic.  If you do not have a primary care provider, please call [phone] and they will assist you.        Care EveryWhere ID     This is your Care EveryWhere ID. This could be used by other organizations to access your [place] medical records  [id number]        Your Vitals Were     Pulse Temperature Respirations Height BMI (Body Mass Index)       65 98  F (36.7  C) (Oral) 16 1.575 m (5' 2.01\") 29.95 kg/m2        Blood Pressure from Last 3 Encounters:   02/05/18 154/72   02/05/18 154/72   01/29/18 143/63    Weight from Last 3 Encounters:   02/05/18 74.3 kg (163 lb 12.8 oz)   02/05/18 74.3 kg (163 lb 12.8 oz)   01/29/18 73.9 kg (163 lb)              We Performed the Following     UA with Microscopic reflex to Culture          Today's Medication Changes          These changes are accurate as of 2/5/18  3:23 PM.  If you have any questions, ask your nurse or doctor.               Start taking these medicines.        Dose/Directions    ciprofloxacin 250 MG tablet   Commonly known as:  CIPRO   Used for:  Urinary frequency   Started by:  Alex Parry MD        Dose:  250 mg   Take 1 tablet (250 mg) by mouth 2 times daily for 5 days   Quantity:  10 tablet   Refills:  0            Where to get your medicines      These medications were sent to [facility] - [address]  [address], [facility] [address]     Phone:  [phone]     ciprofloxacin 250 MG tablet                Primary Care Provider Office Phone # Fax #    César Chung [phone]       [facility] [address]  [facility] [address]        Equal Access to Services     LILLIANA SANDOVAL AH: Hadii althea hogan Sonellie, waaxda luqadaha, qaybta " phoenix lopezdenzel rodriguez ah. So [facility] [phone].    ATENCIÓN: Si ankur nascimento, tiene a contreras disposición servicios gratuitos de asistencia lingüística. Chad al [phone].    We comply with applicable federal civil rights laws and [place] laws. We do not discriminate on the basis of race, color, national origin, age, disability, sex, sexual orientation, or gender identity.            Thank you!     Thank you for choosing [facility]  for your care. Our goal is always to provide you with excellent care. Hearing back from our patients is one way we can continue to improve our services. Please take a few minutes to complete the written survey that you may receive in the mail after your visit with us. Thank you!             Your Updated Medication List - Protect others around you: Learn how to safely use, store and throw away your medicines at www.disposemymeds.org.          This list is accurate as of 2/5/18  3:23 PM.  Always use your most recent med list.                   Brand Name Dispense Instructions for use Diagnosis    ACYCLOVIR PO      Take 400 mg by mouth 2 times daily        aspirin 81 MG chewable tablet      Take 81 mg by mouth daily        calcium carbonate-vitamin D 600-400 MG-UNIT Chew     180 tablet    Take 1 chew tab by mouth 2 times daily    Multiple myeloma not having achieved remission (H)       ciprofloxacin 250 MG tablet    CIPRO    10 tablet    Take 1 tablet (250 mg) by mouth 2 times daily for 5 days    Urinary frequency       * dexamethasone 4 MG tablet    DECADRON    30 tablet    Take 10 tablets (40 mg) by mouth every 7 days Days 1, 8, and 15.    Multiple myeloma not having achieved remission (H)       * dexamethasone 4 MG tablet    DECADRON    30 tablet    Take 10 tablets (40 mg) by mouth every 7 days for 3 doses Days 1, 8, and 15.    Multiple myeloma not having achieved remission (H)       escitalopram 5 MG tablet    LEXAPRO    90 tablet     TAKE 1 TABLET BY MOUTH DAILY    ROBER (generalized anxiety disorder)       IMODIUM A-D PO      Take by mouth as needed        LENalidomide 10 MG Caps capsule CHEMO    REVLIMID    14 capsule    Take 1 capsule (10 mg) by mouth daily for 14 days Days 1 through 14.    Multiple myeloma not having achieved remission (H)       lisinopril 10 MG tablet    PRINIVIL/ZESTRIL    90 tablet    Take 1 tablet (10 mg) by mouth daily    Benign essential hypertension       LORazepam 0.5 MG tablet    ATIVAN    10 tablet    Take 1 tablet (0.5 mg) by mouth every 8 hours as needed (Anxiety, Nausea/Vomiting or Sleep)    Multiple myeloma not having achieved remission (H)       nitroGLYcerin 0.4 MG sublingual tablet    NITROSTAT    25 tablet    For chest pain place 1 tablet under the tongue every 5 minutes for 3 doses. If symptoms persist 5 minutes after 1st dose call 911.    Atypical chest pain       * order for DME     1 Units    Dispense one 4 wheeled walker with hand brakes and seat    Multiple myeloma not having achieved remission (H)       * order for DME     1 Units    Wheelchair.    Need for assistance due to unsteady gait       prochlorperazine 10 MG tablet    COMPAZINE    30 tablet    Take 1 tablet (10 mg) by mouth every 6 hours as needed (Nausea/Vomiting)    Multiple myeloma not having achieved remission (H)       * Notice:  This list has 4 medication(s) that are the same as other medications prescribed for you. Read the directions carefully, and ask your doctor or other care provider to review them with you.

## 2018-02-05 NOTE — PROGRESS NOTES
Infusion Nursing Note:  Quiana Dunaway presents today for C8D15  Patient seen by provider today: Yes: Dr. Parry   present during visit today: Not Applicable.    Note: N/A.    Intravenous Access:  Lab draw site rac, Needle type bf, Gauge 23.  Labs drawn without difficulty.    Treatment Conditions:  Lab Results   Component Value Date    HGB 12.6 02/05/2018     Lab Results   Component Value Date    WBC 4.7 02/05/2018      Lab Results   Component Value Date    ANEU 3.0 02/05/2018     Lab Results   Component Value Date     02/05/2018      Results reviewed, labs MET treatment parameters, ok to proceed with treatment.      Post Infusion Assessment:  Patient tolerated injection without incident.  Site patent and intact, free from redness, edema or discomfort.    Discharge Plan:   Prescription given for cipro and ativan.  Copy of AVS reviewed with patient and/or family.  Patient will return 2/12 for next appointment.  Patient discharged in stable condition accompanied by: daughter-in-law.  Departure Mode: Ambulatory.    Jimy French RN

## 2018-02-05 NOTE — MR AVS SNAPSHOT
After Visit Summary   2/5/2018    Quiana Dunaway    MRN: [id number]           Patient Information     Date Of Birth          1935        Visit Information        Provider Department      2/5/2018 2:00 PM SH INFUSION CHAIR 4 [facility]        Today's Diagnoses     Multiple myeloma not having achieved remission (H)    -  1       Follow-ups after your visit        Follow-up notes from your care team     Return in 7 days (on 2/12/2018).      Your next 10 appointments already scheduled     Feb 12, 2018 12:30 PM CST   Level 2 with SH INFUSION CHAIR 20   [facility] ([facility])    [facility]  [address]  [place] [address]   [phone]            Feb 19, 2018  1:30 PM CST   New Patient Visit with Leona Rodriguez PA-C   [facility] [place] ([facility] [place])    [address]  [address]  [place] [address]   [phone]            Feb 19, 2018  2:00 PM CST   Level 2 with SH INFUSION CHAIR 2   [facility] ([facility])    [facility]  [address]  [place] [address]   [phone]            Mar 05, 2018  2:00 PM CST   Level 2 with SH INFUSION CHAIR 13   [facility] ([facility])    [facility]  [address]  [place] [address]   [phone]            Mar 05, 2018  2:30 PM CST   Return Visit with Alex Parry MD   [facility] ([facility])    [facility]  [address]  [place] [address]   [phone]            Mar 12, 2018  2:00 PM CDT   Level 2 with SH INFUSION CHAIR 9   [facility] ([facility])    [facility] [place]  [address]  [place]  [place] [address]   [phone]            Mar 19, 2018  2:00 PM CDT   Level 2 with SH INFUSION CHAIR 5   [facility] ([facility])    [facility] [place]  [address]  [place] [address]   [phone]            Mar 26, 2018  2:00 PM CDT   Level 2 with SH INFUSION CHAIR 9   [facility] ([facility])    [facility]  [address]  [place] [address]   [phone]            Apr 09, 2018  2:30 PM CDT   Remote PPM Check with RIOS TECH1   [facility] ([facility])    [address]  [place] [address] [phone]           This appointment is for a remote check of your pacemaker.  This is not an appointment at the office.            Apr 09, 2018  3:15 PM CDT   [facility] EP RETURN with Alberto Worthington MD   [facility])    [address]  [place] [address] [phone]              Who to contact     If you have questions or need follow up information about today's clinic visit or your schedule please contact [facility] directly at [phone].  Normal or non-critical lab and imaging results will be communicated to you by [employer], letter or phone within 4 business days after the clinic has received the results. If you do not hear from us within 7 days, please contact the clinic through [employer] or phone. If you have a critical or abnormal lab result, we will notify you by phone as soon as possible.  Submit refill requests through [employer] or call your pharmacy and they will forward the refill request to us. Please allow 3 business days for your refill to be completed.          Additional Information About Your Visit        [employer] Information     [employer] gives you secure access to your electronic  "health record. If you see a primary care provider, you can also send messages to your care team and make appointments. If you have questions, please call your primary care clinic.  If you do not have a primary care provider, please call [phone] and they will assist you.        Care EveryWhere ID     This is your Care EveryWhere ID. This could be used by other organizations to access your [place] medical records  [id number]        Your Vitals Were     Pulse Temperature Respirations Height BMI (Body Mass Index)       65 98  F (36.7  C) (Oral) 16 1.575 m (5' 2.01\") 29.95 kg/m2        Blood Pressure from Last 3 Encounters:   02/05/18 154/72   02/05/18 154/72   01/29/18 143/63    Weight from Last 3 Encounters:   02/05/18 74.3 kg (163 lb 12.8 oz)   02/05/18 74.3 kg (163 lb 12.8 oz)   01/29/18 73.9 kg (163 lb)              We Performed the Following     CBC with platelets differential          Today's Medication Changes          These changes are accurate as of 2/5/18  4:09 PM.  If you have any questions, ask your nurse or doctor.               Start taking these medicines.        Dose/Directions    ciprofloxacin 250 MG tablet   Commonly known as:  CIPRO   Used for:  Urinary frequency   Started by:  Alex Parry MD        Dose:  250 mg   Take 1 tablet (250 mg) by mouth 2 times daily for 5 days   Quantity:  10 tablet   Refills:  0            Where to get your medicines      These medications were sent to [facility] [place], [place] - [address]  [address], [place] [address]     Phone:  [phone]     ciprofloxacin 250 MG tablet                Primary Care Provider Office Phone # Fax #    César Chung [phone]       [facility] [address]  [place] [address]        Equal Access to Services     LILLIANA SANDOVAL AH: Stacie emersono Sonellie, waaxda luqadaha, qaybta kaalmada adeegyabertha, phoenix villeda. So [facility] " [phone].    ATENCIÓN: Si ankur nascimento, tiene a contreras disposición servicios gratuitos de asistencia lingüística. Chad gallardo [phone].    We comply with applicable federal civil rights laws and [place] laws. We do not discriminate on the basis of race, color, national origin, age, disability, sex, sexual orientation, or gender identity.            Thank you!     Thank you for choosing [facility]  for your care. Our goal is always to provide you with excellent care. Hearing back from our patients is one way we can continue to improve our services. Please take a few minutes to complete the written survey that you may receive in the mail after your visit with us. Thank you!             Your Updated Medication List - Protect others around you: Learn how to safely use, store and throw away your medicines at www.disposemymeds.org.          This list is accurate as of 2/5/18  4:09 PM.  Always use your most recent med list.                   Brand Name Dispense Instructions for use Diagnosis    ACYCLOVIR PO      Take 400 mg by mouth 2 times daily        aspirin 81 MG chewable tablet      Take 81 mg by mouth daily        calcium carbonate-vitamin D 600-400 MG-UNIT Chew     180 tablet    Take 1 chew tab by mouth 2 times daily    Multiple myeloma not having achieved remission (H)       ciprofloxacin 250 MG tablet    CIPRO    10 tablet    Take 1 tablet (250 mg) by mouth 2 times daily for 5 days    Urinary frequency       * dexamethasone 4 MG tablet    DECADRON    30 tablet    Take 10 tablets (40 mg) by mouth every 7 days Days 1, 8, and 15.    Multiple myeloma not having achieved remission (H)       * dexamethasone 4 MG tablet    DECADRON    30 tablet    Take 10 tablets (40 mg) by mouth every 7 days for 3 doses Days 1, 8, and 15.    Multiple myeloma not having achieved remission (H)       escitalopram 5 MG tablet    LEXAPRO    90 tablet    TAKE 1 TABLET BY MOUTH DAILY    ROBER (generalized  anxiety disorder)       IMODIUM A-D PO      Take by mouth as needed        LENalidomide 10 MG Caps capsule CHEMO    REVLIMID    14 capsule    Take 1 capsule (10 mg) by mouth daily for 14 days Days 1 through 14.    Multiple myeloma not having achieved remission (H)       lisinopril 10 MG tablet    PRINIVIL/ZESTRIL    90 tablet    Take 1 tablet (10 mg) by mouth daily    Benign essential hypertension       LORazepam 0.5 MG tablet    ATIVAN    10 tablet    Take 1 tablet (0.5 mg) by mouth every 8 hours as needed (Anxiety, Nausea/Vomiting or Sleep)    Multiple myeloma not having achieved remission (H)       nitroGLYcerin 0.4 MG sublingual tablet    NITROSTAT    25 tablet    For chest pain place 1 tablet under the tongue every 5 minutes for 3 doses. If symptoms persist 5 minutes after 1st dose call 911.    Atypical chest pain       * order for DME     1 Units    Dispense one 4 wheeled walker with hand brakes and seat    Multiple myeloma not having achieved remission (H)       * order for DME     1 Units    Wheelchair.    Need for assistance due to unsteady gait       prochlorperazine 10 MG tablet    COMPAZINE    30 tablet    Take 1 tablet (10 mg) by mouth every 6 hours as needed (Nausea/Vomiting)    Multiple myeloma not having achieved remission (H)       * Notice:  This list has 4 medication(s) that are the same as other medications prescribed for you. Read the directions carefully, and ask your doctor or other care provider to review them with you.

## 2018-02-05 NOTE — PROGRESS NOTES
"Oncology Rooming Note    February 5, 2018 2:20 PM   Quiana Dunaway is a 82 year old female who presents for:    Chief Complaint   Patient presents with     Oncology Clinic Visit     Initial Vitals: /72  Pulse 65  Temp 98  F (36.7  C) (Oral)  Resp 16  Ht 1.575 m (5' 2.01\")  Wt 74.3 kg (163 lb 12.8 oz)  BMI 29.95 kg/m2 Estimated body mass index is 29.95 kg/(m^2) as calculated from the following:    Height as of this encounter: 1.575 m (5' 2.01\").    Weight as of this encounter: 74.3 kg (163 lb 12.8 oz). Body surface area is 1.8 meters squared.  Data Unavailable Comment: Data Unavailable   No LMP recorded. Patient is postmenopausal.  Allergies reviewed: Yes  Medications reviewed: Yes    Medications: Medication refills not needed today.  Pharmacy name entered into [employer]:    [facility] - [place], [place] - [address] [facility] [address]  [facility] [place], [place] - [address]  [facility] - [place], [place] - [address]    Clinical concerns: None     5 minutes for nursing intake (face to face time)     Bonny Sharif CMA              "

## 2018-02-05 NOTE — PATIENT INSTRUCTIONS
UA.  Urology consult for urinary frequency.  Scheduled/kelly  Ciproflox twice a day for 5 days.  Continue chemotherapy.  Scheduled/kelly  Follow up in 1 month.  Scheduled/kelly    AVS printed & given to patient/kelly

## 2018-02-06 NOTE — PROGRESS NOTES
Visit Date:   02/05/2018    HEMATOLOGY HISTORY: Mrs. Dunaway is a lady with multiple myeloma (IgG kappa). High risk, t(14;16).  1. She was evaluated for pancytopenia. On 05/01/2017:  -WBC of 3.4, hemoglobin of 10.2 and platelet of 154.    -CMP, Vitamin B12, folate, iron, ferritin, TSH aand haptoglobin are all normal.  2.  CT chest angiogram on 03/07/2017 did not reveal pulmonary embolism.  There is left thyroid nodule measuring 2.6 cm.  There is a 1.2 cm cystic appearing lesion in pancreatic tail.  It could be IPMN.  No splenomegaly.  3.  Colonoscopy on 04/28/2017 revealed colon polyps. Pathology revealed tubular adenoma.  No high-grade dysplasia.  4.  SPEP on 07/07/2017 reveals M-spike of 1.8.  5. Bone marrow biopsy on 07/12/2017 reveals hypercellular bone marrow with kappa monotypic plasma cell population consistent with multiple myeloma.  Bone marrow is 70% cellular.  Plasma cell is 50-60%.     -There is gain of chromosome 1, 5, 9, 15, and 17.  There is translocation 14;16.   6.  Multiple labs were done on 07/18/2017:  -M-spike of 1.9.   -Immunofixation reveals monoclonal IgG kappa.    -IgG level of 2970.  IgA of 15 and IgM of 175.    -Kappa free light chain of 67.7.  Lambda free light chain of 1.42.  Ratio of kappa to lambda of 47.71.    -Beta 2 microglobulin of 3.4.   7. PET scan on 07/24/2017 reveals several focal areas of increased FDG uptake consistent with multiple myeloma.  There is probable epithelial cyst in tail of the pancreas.  No associated abnormal FDG activity.  Left thyroid cysts or nodules without any FDG activity.  There is a 0.3 cm left upper lobe lung nodule.   8.  Velcade, Revlimid and dexamethasone started on 08/14/2017.  9. On 10/02/2017 (after 2 cycles):  -M-spike of 0.5  -Kappa free light chain of 4.3  -IgG of 717.      SUBJECTIVE:    Ms. Dunaway is an 82-year-old female with high risk IgG kappa multiple myeloma.  She is on Velcade, Revlimid and dexamethasone since 08/2017.  Disease is  responding.      Overall, she is tolerating treatment well.  She has fatigue.      Patient lately has been having urinary frequency.  She has to wake up at night every 1-2 hour to urinate.  Because of that she does not get good sleep.  She has been more tired.      REVIEW OF SYSTEMS:  No headache.  No dizziness.  No ear pain or sore throat.  No chest pain  or difficulty breathing.  No abdominal pain, nausea or vomiting.  Patient feels that her abdomen is little more distended.  No urinary incontinence.  She has urinary frequency.  No burning.  No pain.  No blood in the urine.  Patient has alternating diarrhea and constipation.  No blood in the stool.      Discussed regarding neuropathy.  Occasionally, she gets some numbness and tingling in the lower extremities.  Not in the upper extremities.      PHYSICAL EXAMINATION:    Alert and oriented × 3.  Eyes: No icterus.    Throat: No ulcer.    Neck: No lymphadenopathy.  Nontender.  Axilla: No lymphadenopathy.  Lungs: Good air entry bilaterally.  Occasional wheezing.  Heart: Regular.  Abdomen: Soft.  Nondistended.  Nontender.  No masses felt.  Extremities: Mild edema.  Skin: No petechiae.      LABORATORY DATA:  Reviewed.  CBC is normal.      ASSESSMENT:   1.  An 82-year-old female with high risk IgG kappa multiple myeloma which is responding to Velcade, Revlimid and dexamethasone.   2.  Fatigue secondary to myeloma, chemotherapy and decreased sleep.   3.  Insomnia.     4.  Urinary frequency.      PLAN:   1.  I discussed with her regarding myeloma. Labs done on 01/22/2018 reveals further decrease in IgG level to 495.  [place] free light chain has decreased to 2.57.  M-spike is stable at 0.3.  I explained to her that myeloma is responding to treatment. She was happy to know that.   Overall, she is tolerating Velcade, Revlimid and dexamethasone well.  She will continue on it.   2.  Discussed regarding urinary frequency. Will get a UA. Will also set her up to see urologist.  I  am empirically starting her on ciprofloxacin 250 mg twice a day for 5 days.   3.  Discussed regarding fatigue.  Lately, her fatigue has gotten worse as the patient does not get good sleep because of urinary frequency. Hopefully, once urinary frequency is better she can get better sleep and fatigue will be less. Some of the fatigue is due to her age and myeloma and its treatment.     4.  The patient is on Zometa for bone health which will be continued.  She does not have any dental infection or pain.  No jaw pain, swelling or infection.   5.  The patient had multiple questions, which were all answered.  I will see her in a month.         YVES PEREZ MD             D: 2018   T: 2018   MT: SAYDA      Name:     DUGLAS CASTILLO   MRN:      [id number]        Account:      [id number]   :      1935           Visit Date:   2018      Document: [id number]

## 2018-02-07 DIAGNOSIS — C90.00 MULTIPLE MYELOMA NOT HAVING ACHIEVED REMISSION (H): Primary | ICD-10-CM

## 2018-02-07 RX ORDER — EPINEPHRINE 1 MG/ML
0.3 INJECTION, SOLUTION INTRAMUSCULAR; SUBCUTANEOUS EVERY 5 MIN PRN
Status: CANCELLED | OUTPATIENT
Start: 2018-02-19

## 2018-02-07 RX ORDER — ALBUTEROL SULFATE 90 UG/1
1-2 AEROSOL, METERED RESPIRATORY (INHALATION)
Status: CANCELLED
Start: 2018-02-12

## 2018-02-07 RX ORDER — ALBUTEROL SULFATE 90 UG/1
1-2 AEROSOL, METERED RESPIRATORY (INHALATION)
Status: CANCELLED
Start: 2018-02-19

## 2018-02-07 RX ORDER — SODIUM CHLORIDE 9 MG/ML
1000 INJECTION, SOLUTION INTRAVENOUS CONTINUOUS PRN
Status: CANCELLED
Start: 2018-02-12

## 2018-02-07 RX ORDER — METHYLPREDNISOLONE SODIUM SUCCINATE 125 MG/2ML
125 INJECTION, POWDER, LYOPHILIZED, FOR SOLUTION INTRAMUSCULAR; INTRAVENOUS
Status: CANCELLED
Start: 2018-02-19

## 2018-02-07 RX ORDER — ALBUTEROL SULFATE 0.83 MG/ML
2.5 SOLUTION RESPIRATORY (INHALATION)
Status: CANCELLED | OUTPATIENT
Start: 2018-02-19

## 2018-02-07 RX ORDER — ALBUTEROL SULFATE 0.83 MG/ML
2.5 SOLUTION RESPIRATORY (INHALATION)
Status: CANCELLED | OUTPATIENT
Start: 2018-02-12

## 2018-02-07 RX ORDER — EPINEPHRINE 0.3 MG/.3ML
0.3 INJECTION SUBCUTANEOUS EVERY 5 MIN PRN
Status: CANCELLED | OUTPATIENT
Start: 2018-02-12

## 2018-02-07 RX ORDER — EPINEPHRINE 0.3 MG/.3ML
0.3 INJECTION SUBCUTANEOUS EVERY 5 MIN PRN
Status: CANCELLED | OUTPATIENT
Start: 2018-02-26

## 2018-02-07 RX ORDER — MEPERIDINE HYDROCHLORIDE 25 MG/ML
25 INJECTION INTRAMUSCULAR; INTRAVENOUS; SUBCUTANEOUS EVERY 30 MIN PRN
Status: CANCELLED | OUTPATIENT
Start: 2018-02-12

## 2018-02-07 RX ORDER — METHYLPREDNISOLONE SODIUM SUCCINATE 125 MG/2ML
125 INJECTION, POWDER, LYOPHILIZED, FOR SOLUTION INTRAMUSCULAR; INTRAVENOUS
Status: CANCELLED
Start: 2018-02-12

## 2018-02-07 RX ORDER — SODIUM CHLORIDE 9 MG/ML
1000 INJECTION, SOLUTION INTRAVENOUS CONTINUOUS PRN
Status: CANCELLED
Start: 2018-02-26

## 2018-02-07 RX ORDER — LORAZEPAM 2 MG/ML
0.5 INJECTION INTRAMUSCULAR EVERY 4 HOURS PRN
Status: CANCELLED
Start: 2018-02-19

## 2018-02-07 RX ORDER — DIPHENHYDRAMINE HYDROCHLORIDE 50 MG/ML
50 INJECTION INTRAMUSCULAR; INTRAVENOUS
Status: CANCELLED
Start: 2018-02-19

## 2018-02-07 RX ORDER — DIPHENHYDRAMINE HYDROCHLORIDE 50 MG/ML
50 INJECTION INTRAMUSCULAR; INTRAVENOUS
Status: CANCELLED
Start: 2018-02-26

## 2018-02-07 RX ORDER — SODIUM CHLORIDE 9 MG/ML
1000 INJECTION, SOLUTION INTRAVENOUS CONTINUOUS PRN
Status: CANCELLED
Start: 2018-02-19

## 2018-02-07 RX ORDER — DIPHENHYDRAMINE HYDROCHLORIDE 50 MG/ML
50 INJECTION INTRAMUSCULAR; INTRAVENOUS
Status: CANCELLED
Start: 2018-02-12

## 2018-02-07 RX ORDER — EPINEPHRINE 0.3 MG/.3ML
0.3 INJECTION SUBCUTANEOUS EVERY 5 MIN PRN
Status: CANCELLED | OUTPATIENT
Start: 2018-02-19

## 2018-02-07 RX ORDER — MEPERIDINE HYDROCHLORIDE 25 MG/ML
25 INJECTION INTRAMUSCULAR; INTRAVENOUS; SUBCUTANEOUS EVERY 30 MIN PRN
Status: CANCELLED | OUTPATIENT
Start: 2018-02-26

## 2018-02-07 RX ORDER — LORAZEPAM 2 MG/ML
0.5 INJECTION INTRAMUSCULAR EVERY 4 HOURS PRN
Status: CANCELLED
Start: 2018-02-26

## 2018-02-07 RX ORDER — EPINEPHRINE 1 MG/ML
0.3 INJECTION, SOLUTION INTRAMUSCULAR; SUBCUTANEOUS EVERY 5 MIN PRN
Status: CANCELLED | OUTPATIENT
Start: 2018-02-12

## 2018-02-07 RX ORDER — ALBUTEROL SULFATE 90 UG/1
1-2 AEROSOL, METERED RESPIRATORY (INHALATION)
Status: CANCELLED
Start: 2018-02-26

## 2018-02-07 RX ORDER — ALBUTEROL SULFATE 0.83 MG/ML
2.5 SOLUTION RESPIRATORY (INHALATION)
Status: CANCELLED | OUTPATIENT
Start: 2018-02-26

## 2018-02-07 RX ORDER — METHYLPREDNISOLONE SODIUM SUCCINATE 125 MG/2ML
125 INJECTION, POWDER, LYOPHILIZED, FOR SOLUTION INTRAMUSCULAR; INTRAVENOUS
Status: CANCELLED
Start: 2018-02-26

## 2018-02-07 RX ORDER — EPINEPHRINE 1 MG/ML
0.3 INJECTION, SOLUTION INTRAMUSCULAR; SUBCUTANEOUS EVERY 5 MIN PRN
Status: CANCELLED | OUTPATIENT
Start: 2018-02-26

## 2018-02-07 RX ORDER — LORAZEPAM 2 MG/ML
0.5 INJECTION INTRAMUSCULAR EVERY 4 HOURS PRN
Status: CANCELLED
Start: 2018-02-12

## 2018-02-07 RX ORDER — MEPERIDINE HYDROCHLORIDE 25 MG/ML
25 INJECTION INTRAMUSCULAR; INTRAVENOUS; SUBCUTANEOUS EVERY 30 MIN PRN
Status: CANCELLED | OUTPATIENT
Start: 2018-02-19

## 2018-02-08 RX ORDER — LENALIDOMIDE 10 MG/1
10 CAPSULE ORAL DAILY
Qty: 14 CAPSULE | Refills: 0 | Status: SHIPPED | OUTPATIENT
Start: 2018-02-08 | End: 2018-12-26

## 2018-02-08 RX ORDER — DEXAMETHASONE 4 MG/1
40 TABLET ORAL
Qty: 30 TABLET | Refills: 0 | Status: SHIPPED | OUTPATIENT
Start: 2018-02-08 | End: 2018-02-23

## 2018-02-12 ENCOUNTER — HOSPITAL ENCOUNTER (OUTPATIENT)
Facility: CLINIC | Age: 83
Setting detail: SPECIMEN
Discharge: HOME | End: 2018-02-12
Attending: INTERNAL MEDICINE | Admitting: INTERNAL MEDICINE
Payer: MEDICARE

## 2018-02-12 ENCOUNTER — INFUSION THERAPY VISIT (OUTPATIENT)
Dept: INFUSION THERAPY | Facility: CLINIC | Age: 83
End: 2018-02-12
Attending: INTERNAL MEDICINE
Payer: MEDICARE

## 2018-02-12 VITALS
HEART RATE: 60 BPM | RESPIRATION RATE: 16 BRPM | SYSTOLIC BLOOD PRESSURE: 133 MMHG | DIASTOLIC BLOOD PRESSURE: 65 MMHG | TEMPERATURE: 97.8 F | BODY MASS INDEX: 30.02 KG/M2 | HEIGHT: 62 IN | WEIGHT: 163.14 LBS

## 2018-02-12 DIAGNOSIS — C90.00 MULTIPLE MYELOMA NOT HAVING ACHIEVED REMISSION (H): Primary | ICD-10-CM

## 2018-02-12 DIAGNOSIS — E87.6 HYPOKALEMIA: ICD-10-CM

## 2018-02-12 LAB
ALBUMIN SERPL-MCNC: 3.7 G/DL (ref 3.4–5)
ALP SERPL-CCNC: 85 U/L (ref 40–150)
ALT SERPL W P-5'-P-CCNC: 22 U/L (ref 0–50)
ANION GAP SERPL CALCULATED.3IONS-SCNC: 7 MMOL/L (ref 3–14)
AST SERPL W P-5'-P-CCNC: 15 U/L (ref 0–45)
BASOPHILS # BLD AUTO: 0 10E9/L (ref 0–0.2)
BASOPHILS NFR BLD AUTO: 0.5 %
BILIRUB SERPL-MCNC: 0.9 MG/DL (ref 0.2–1.3)
BUN SERPL-MCNC: 18 MG/DL (ref 7–30)
CALCIUM SERPL-MCNC: 9 MG/DL (ref 8.5–10.1)
CHLORIDE SERPL-SCNC: 105 MMOL/L (ref 94–109)
CO2 SERPL-SCNC: 30 MMOL/L (ref 20–32)
CREAT SERPL-MCNC: 0.59 MG/DL (ref 0.52–1.04)
DIFFERENTIAL METHOD BLD: ABNORMAL
ELLIPTOCYTES BLD QL SMEAR: SLIGHT
EOSINOPHIL # BLD AUTO: 0.2 10E9/L (ref 0–0.7)
EOSINOPHIL NFR BLD AUTO: 3.8 %
ERYTHROCYTE [DISTWIDTH] IN BLOOD BY AUTOMATED COUNT: 15.6 % (ref 10–15)
GFR SERPL CREATININE-BSD FRML MDRD: >90 ML/MIN/1.7M2
GLUCOSE SERPL-MCNC: 84 MG/DL (ref 70–99)
HCT VFR BLD AUTO: 38.5 % (ref 35–47)
HGB BLD-MCNC: 12.8 G/DL (ref 11.7–15.7)
IMM GRANULOCYTES # BLD: 0 10E9/L (ref 0–0.4)
IMM GRANULOCYTES NFR BLD: 0.5 %
LYMPHOCYTES # BLD AUTO: 1.2 10E9/L (ref 0.8–5.3)
LYMPHOCYTES NFR BLD AUTO: 21.4 %
MCH RBC QN AUTO: 28.6 PG (ref 26.5–33)
MCHC RBC AUTO-ENTMCNC: 33.2 G/DL (ref 31.5–36.5)
MCV RBC AUTO: 86 FL (ref 78–100)
MONOCYTES # BLD AUTO: 1.3 10E9/L (ref 0–1.3)
MONOCYTES NFR BLD AUTO: 22.6 %
NEUTROPHILS # BLD AUTO: 2.9 10E9/L (ref 1.6–8.3)
NEUTROPHILS NFR BLD AUTO: 51.2 %
NRBC # BLD AUTO: 0 10*3/UL
NRBC BLD AUTO-RTO: 0 /100
PLATELET # BLD AUTO: 151 10E9/L (ref 150–450)
PLATELET # BLD EST: ABNORMAL 10*3/UL
POTASSIUM SERPL-SCNC: 3 MMOL/L (ref 3.4–5.3)
PROT SERPL-MCNC: 6.9 G/DL (ref 6.8–8.8)
RBC # BLD AUTO: 4.48 10E12/L (ref 3.8–5.2)
SODIUM SERPL-SCNC: 142 MMOL/L (ref 133–144)
WBC # BLD AUTO: 5.6 10E9/L (ref 4–11)

## 2018-02-12 PROCEDURE — 83883 ASSAY NEPHELOMETRY NOT SPEC: CPT | Performed by: INTERNAL MEDICINE

## 2018-02-12 PROCEDURE — 25000128 H RX IP 250 OP 636: Performed by: INTERNAL MEDICINE

## 2018-02-12 PROCEDURE — 96401 CHEMO ANTI-NEOPL SQ/IM: CPT

## 2018-02-12 PROCEDURE — 84165 PROTEIN E-PHORESIS SERUM: CPT | Performed by: INTERNAL MEDICINE

## 2018-02-12 PROCEDURE — 85025 COMPLETE CBC W/AUTO DIFF WBC: CPT | Performed by: INTERNAL MEDICINE

## 2018-02-12 PROCEDURE — 96374 THER/PROPH/DIAG INJ IV PUSH: CPT

## 2018-02-12 PROCEDURE — 82784 ASSAY IGA/IGD/IGG/IGM EACH: CPT | Performed by: INTERNAL MEDICINE

## 2018-02-12 PROCEDURE — 00000402 ZZHCL STATISTIC TOTAL PROTEIN: Performed by: INTERNAL MEDICINE

## 2018-02-12 PROCEDURE — 96375 TX/PRO/DX INJ NEW DRUG ADDON: CPT

## 2018-02-12 PROCEDURE — 80053 COMPREHEN METABOLIC PANEL: CPT | Performed by: INTERNAL MEDICINE

## 2018-02-12 RX ORDER — ZOLEDRONIC ACID 0.04 MG/ML
4 INJECTION, SOLUTION INTRAVENOUS ONCE
Status: COMPLETED | OUTPATIENT
Start: 2018-02-12 | End: 2018-02-12

## 2018-02-12 RX ORDER — ZOLEDRONIC ACID 0.04 MG/ML
4 INJECTION, SOLUTION INTRAVENOUS ONCE
Status: CANCELLED | OUTPATIENT
Start: 2018-03-12 | End: 2018-03-12

## 2018-02-12 RX ORDER — POTASSIUM CHLORIDE 1500 MG/1
40 TABLET, EXTENDED RELEASE ORAL ONCE
Qty: 2 TABLET | Refills: 0 | Status: SHIPPED | OUTPATIENT
Start: 2018-02-12 | End: 2018-02-12

## 2018-02-12 RX ADMIN — ZOLEDRONIC ACID 4 MG: 0.04 INJECTION, SOLUTION INTRAVENOUS at 14:07

## 2018-02-12 RX ADMIN — BORTEZOMIB 2.7 MG: 3.5 INJECTION, POWDER, LYOPHILIZED, FOR SOLUTION INTRAVENOUS; SUBCUTANEOUS at 14:41

## 2018-02-12 NOTE — PROGRESS NOTES
Infusion Nursing Note:  Quiana Dunaway presents today for C9D1 Velcade/Zometa.    Patient seen by provider today: No   present during visit today: Not Applicable.    Note: Dr. Parry aware of continued urinary frequency after completion of cipro.  No further orders at this time. Patient to follow up with urology on 2/19/18 as previously scheduled.    For K 3.0, replace orally per protocol per Dr. Parry.  Script sent downstairs.  Pt verbalizes understanding and agrees with plan. Wes New RN      Intravenous Access:  Labs drawn without difficulty.  Peripheral IV placed.    Treatment Conditions:  Lab Results   Component Value Date    HGB 12.8 02/12/2018     Lab Results   Component Value Date    WBC 5.6 02/12/2018      Lab Results   Component Value Date    ANEU 2.9 02/12/2018     Lab Results   Component Value Date     02/12/2018      Lab Results   Component Value Date     02/12/2018                   Lab Results   Component Value Date    POTASSIUM 3.0 02/12/2018           No results found for: MAG         Lab Results   Component Value Date    CR 0.59 02/12/2018                   Lab Results   Component Value Date    HUMBERTO 9.0 02/12/2018                Lab Results   Component Value Date    BILITOTAL 0.9 02/12/2018           Lab Results   Component Value Date    ALBUMIN 3.7 02/12/2018                    Lab Results   Component Value Date    ALT 22 02/12/2018           Lab Results   Component Value Date    AST 15 02/12/2018       Results reviewed, labs MET treatment parameters, ok to proceed with treatment.      Post Infusion Assessment:  Patient tolerated infusion without incident.  Patient tolerated injection without incident.  Blood return noted pre and post infusion.  Site patent and intact, free from redness, edema or discomfort.  No evidence of extravasations.  Access discontinued per protocol.    Discharge Plan:   Discharge instructions reviewed with: Patient and Family.  Patient and/or  family verbalized understanding of discharge instructions and all questions answered.  Copy of AVS reviewed with patient and/or family.  Patient will return 2/19/18 for next appointment.  Patient discharged in stable condition accompanied by: daughter.  Departure Mode: Ambulatory.    Wes New RN

## 2018-02-12 NOTE — MR AVS SNAPSHOT
After Visit Summary   2/12/2018    Quiana Dunaway    MRN: [id number]           Patient Information     Date Of Birth          1935        Visit Information        Provider Department      2/12/2018 12:30 PM  INFUSION CHAIR 20 [facility]        Today's Diagnoses     Multiple myeloma not having achieved remission (H)    -  1    Hypokalemia           Follow-ups after your visit        Your next 10 appointments already scheduled     Feb 19, 2018  1:15 PM CST   Return Visit with  Oncology Nurse   [facility] ([facility])    [place] [id number]  [place] [address]   [phone]            Feb 19, 2018  1:30 PM CST   New Patient Visit with Leona Rodriguez PA-C   [facility] [place] ([facility] [place])    [address]  [address]  [place] [address]   [phone]            Feb 19, 2018  2:00 PM CST   Level 2 with  INFUSION CHAIR 2   [facility] ([facility])    [facility]  [address]  [place] [address]   [phone]            Mar 05, 2018  2:00 PM CST   Level 2 with  INFUSION CHAIR 13   [facility] ([facility])    [facility]  [address]  [place] [address]   [phone]            Mar 05, 2018  2:30 PM CST   Return Visit with Alex Parry MD   [facility] ([facility])    [facility]  [address]  [place] [address]   [phone]            Mar 12, 2018  2:00 PM CDT   Level 2 with  INFUSION CHAIR 9   [facility] ([facility])    [facility]  [address]  [place] [address]   [phone]            Mar 19, 2018  2:00 PM CDT    Level 2 with  INFUSION CHAIR 5   [facility] ([facility])    [facility] [place]  [address]  [place] [address]   [phone]            Mar 26, 2018  2:00 PM CDT   Level 2 with  INFUSION CHAIR 9   [facility] ([facility])    [facility] [place]  [address]  [place] [address] [phone]            Apr 09, 2018  2:30 PM CDT   Remote PPM Check with RIOS TECH1   [facility] ([facility])    [address]  [place] [address] [phone]           This appointment is for a remote check of your pacemaker.  This is not an appointment at the office.            Apr 09, 2018  3:15 PM CDT   [facility] EP RETURN with Alberto Worthington MD   [facility] ([facility])    [address]  [facility] [address] [phone]              Who to contact     If you have questions or need follow up information about today's clinic visit or your schedule please contact [facility] directly at [phone].  Normal or non-critical lab and imaging results will be communicated to you by Eagle-i Musichart, letter or phone within 4 business days after the clinic has received the results. If you do not hear from us within 7 days, please contact the clinic through Eagle-i Musichart or phone. If you have a critical or abnormal lab result, we will notify you by phone as soon as possible.  Submit refill requests through Eterniam or call your pharmacy and they will forward the refill request to us. Please allow 3 business days for your refill to be completed.          Additional Information About Your Visit        Eterniam Information     Eterniam gives you secure access to your electronic health record. If you see a primary care provider, you can also  "send messages to your care team and make appointments. If you have questions, please call your primary care clinic.  If you do not have a primary care provider, please call [phone] and they will assist you.        Care EveryWhere ID     This is your Care EveryWhere ID. This could be used by other organizations to access your [facility] records  [id number]        Your Vitals Were     Pulse Temperature Respirations Height          60 97.8  F (36.6  C) (Oral) 16 1.575 m (5' 2.01\")         Blood Pressure from Last 3 Encounters:   02/12/18 133/65   02/05/18 154/72   02/05/18 154/72    Weight from Last 3 Encounters:   02/05/18 74.3 kg (163 lb 12.8 oz)   02/05/18 74.3 kg (163 lb 12.8 oz)   01/29/18 73.9 kg (163 lb)              We Performed the Following     CBC with platelets differential     Comprehensive metabolic panel     IgG     Kappa and lambda light chain     Protein electrophoresis          Today's Medication Changes          These changes are accurate as of 2/12/18  2:34 PM.  If you have any questions, ask your nurse or doctor.               Start taking these medicines.        Dose/Directions    Potassium Chloride ER 20 MEQ Tbcr   Used for:  Hypokalemia        Dose:  40 mEq   Take 2 tablets (40 mEq) by mouth once for 1 dose   Quantity:  2 tablet   Refills:  0            Where to get your medicines      These medications were sent to [facility] [place] - [place], [place] - [address]  [address], [place] [address]     Phone:  [phone]     Potassium Chloride ER 20 MEQ Tbcr                Primary Care Provider Office Phone # Fax #    César Chung [phone]       [facility] [place] [address]  [place] [address]        Equal Access to Services     LILLIANA SANDOVAL : Stacie emersono Sonellie, waaxda luqadaha, qaybta kaalmada adedenzelyada, pho[place] villeda. So [facility] [phone].    ATENCIÓN: Si ankur espmati rider contreras " disposición servicios gratuitos de asistencia lingüística. Chad gallardo [phone].    We comply with applicable federal civil rights laws and [place] laws. We do not discriminate on the basis of race, color, national origin, age, disability, sex, sexual orientation, or gender identity.            Thank you!     Thank you for choosing [facility]  for your care. Our goal is always to provide you with excellent care. Hearing back from our patients is one way we can continue to improve our services. Please take a few minutes to complete the written survey that you may receive in the mail after your visit with us. Thank you!             Your Updated Medication List - Protect others around you: Learn how to safely use, store and throw away your medicines at www.disposemymeds.org.          This list is accurate as of 2/12/18  2:34 PM.  Always use your most recent med list.                   Brand Name Dispense Instructions for use Diagnosis    ACYCLOVIR PO      Take 400 mg by mouth 2 times daily        aspirin 81 MG chewable tablet      Take 81 mg by mouth daily        calcium carbonate-vitamin D 600-400 MG-UNIT Chew     180 tablet    Take 1 chew tab by mouth 2 times daily    Multiple myeloma not having achieved remission (H)       * dexamethasone 4 MG tablet    DECADRON    30 tablet    Take 10 tablets (40 mg) by mouth every 7 days Days 1, 8, and 15.    Multiple myeloma not having achieved remission (H)       * dexamethasone 4 MG tablet    DECADRON    30 tablet    Take 10 tablets (40 mg) by mouth every 7 days for 3 doses Days 1, 8, and 15.    Multiple myeloma not having achieved remission (H)       escitalopram 5 MG tablet    LEXAPRO    90 tablet    TAKE 1 TABLET BY MOUTH DAILY    ROBER (generalized anxiety disorder)       IMODIUM A-D PO      Take by mouth as needed        LENalidomide 10 MG Caps capsule CHEMO    REVLIMID    14 capsule    Take 1 capsule (10 mg) by mouth daily for 14 days  Days 1 through 14. Then off for 7 days.    Multiple myeloma not having achieved remission (H)       lisinopril 10 MG tablet    PRINIVIL/ZESTRIL    90 tablet    Take 1 tablet (10 mg) by mouth daily    Benign essential hypertension       LORazepam 0.5 MG tablet    ATIVAN    10 tablet    Take 1 tablet (0.5 mg) by mouth every 8 hours as needed (Anxiety, Nausea/Vomiting or Sleep)    Multiple myeloma not having achieved remission (H)       nitroGLYcerin 0.4 MG sublingual tablet    NITROSTAT    25 tablet    For chest pain place 1 tablet under the tongue every 5 minutes for 3 doses. If symptoms persist 5 minutes after 1st dose call 911.    Atypical chest pain       * order for DME     1 Units    Dispense one 4 wheeled walker with hand brakes and seat    Multiple myeloma not having achieved remission (H)       * order for DME     1 Units    Wheelchair.    Need for assistance due to unsteady gait       Potassium Chloride ER 20 MEQ Tbcr     2 tablet    Take 2 tablets (40 mEq) by mouth once for 1 dose    Hypokalemia       prochlorperazine 10 MG tablet    COMPAZINE    30 tablet    Take 1 tablet (10 mg) by mouth every 6 hours as needed (Nausea/Vomiting)    Multiple myeloma not having achieved remission (H)       * Notice:  This list has 4 medication(s) that are the same as other medications prescribed for you. Read the directions carefully, and ask your doctor or other care provider to review them with you.

## 2018-02-13 LAB
ALBUMIN SERPL ELPH-MCNC: 4 G/DL (ref 3.7–5.1)
ALPHA1 GLOB SERPL ELPH-MCNC: 0.4 G/DL (ref 0.2–0.4)
ALPHA2 GLOB SERPL ELPH-MCNC: 0.9 G/DL (ref 0.5–0.9)
B-GLOBULIN SERPL ELPH-MCNC: 0.7 G/DL (ref 0.6–1)
GAMMA GLOB SERPL ELPH-MCNC: 0.5 G/DL (ref 0.7–1.6)
IGG SERPL-MCNC: 479 MG/DL (ref 695–1620)
KAPPA LC UR-MCNC: 2.45 MG/DL (ref 0.33–1.94)
KAPPA LC/LAMBDA SER: 3.45 {RATIO} (ref 0.26–1.65)
LAMBDA LC SERPL-MCNC: 0.71 MG/DL (ref 0.57–2.63)
M PROTEIN SERPL ELPH-MCNC: 0.3 G/DL
PROT PATTERN SERPL ELPH-IMP: ABNORMAL

## 2018-02-15 ENCOUNTER — TELEPHONE (OUTPATIENT)
Dept: PHARMACY | Facility: CLINIC | Age: 83
End: 2018-02-15

## 2018-02-15 NOTE — ORAL ONC MGMT
2/15/18: Spoke with Regi, Quiana's daughter in law, about how Quiana was feeling in regards to her fatigue the last couple days. On Monday we spoke briefly and Quiana was feeling far more fatigued today at the restart of her Revlimid than she has with any other cycle. Previously, she has managed her fatigue, but this time she has been wiped out, in bed, and not able to proceed with work like she normally does. Regi said yesterday she was in bed most of the day. Today she left work early and is going home because of fatigue. She hasn't been able to eat much, but has been drinking water. Per Dr. Parry, hold the revlimid for the time being. Will F/U on Monday when she is in for Velcade to see if she is feeling any better and if she wants to proceed. Patient is aware that she can be seen sooner by provider if she feels things are not improving. Regi is aware of plan and will relay information to Quiana.  Debra Matos PharmD  February 15, 2018

## 2018-02-19 ENCOUNTER — OFFICE VISIT (OUTPATIENT)
Dept: UROLOGY | Facility: CLINIC | Age: 83
End: 2018-02-19
Payer: MEDICARE

## 2018-02-19 ENCOUNTER — INFUSION THERAPY VISIT (OUTPATIENT)
Dept: INFUSION THERAPY | Facility: CLINIC | Age: 83
End: 2018-02-19
Attending: INTERNAL MEDICINE
Payer: MEDICARE

## 2018-02-19 ENCOUNTER — ONCOLOGY VISIT (OUTPATIENT)
Dept: ONCOLOGY | Facility: CLINIC | Age: 83
End: 2018-02-19
Attending: INTERNAL MEDICINE
Payer: MEDICARE

## 2018-02-19 ENCOUNTER — HOSPITAL ENCOUNTER (OUTPATIENT)
Facility: CLINIC | Age: 83
Setting detail: SPECIMEN
End: 2018-02-19
Attending: INTERNAL MEDICINE
Payer: MEDICARE

## 2018-02-19 ENCOUNTER — DOCUMENTATION ONLY (OUTPATIENT)
Dept: PHARMACY | Facility: CLINIC | Age: 83
End: 2018-02-19

## 2018-02-19 VITALS
TEMPERATURE: 97.9 F | WEIGHT: 160 LBS | BODY MASS INDEX: 29.44 KG/M2 | SYSTOLIC BLOOD PRESSURE: 134 MMHG | DIASTOLIC BLOOD PRESSURE: 69 MMHG | RESPIRATION RATE: 18 BRPM | HEART RATE: 66 BPM | HEIGHT: 62 IN

## 2018-02-19 VITALS
WEIGHT: 163 LBS | HEART RATE: 70 BPM | DIASTOLIC BLOOD PRESSURE: 84 MMHG | BODY MASS INDEX: 30 KG/M2 | HEIGHT: 62 IN | SYSTOLIC BLOOD PRESSURE: 130 MMHG

## 2018-02-19 DIAGNOSIS — R30.0 DYSURIA: Primary | ICD-10-CM

## 2018-02-19 DIAGNOSIS — R35.0 URINARY FREQUENCY: ICD-10-CM

## 2018-02-19 DIAGNOSIS — C90.00 MULTIPLE MYELOMA NOT HAVING ACHIEVED REMISSION (H): Primary | ICD-10-CM

## 2018-02-19 LAB
ALBUMIN UR-MCNC: ABNORMAL MG/DL
APPEARANCE UR: CLEAR
BASOPHILS # BLD AUTO: 0 10E9/L (ref 0–0.2)
BASOPHILS NFR BLD AUTO: 0.2 %
BILIRUB UR QL STRIP: NEGATIVE
COLOR UR AUTO: YELLOW
DIFFERENTIAL METHOD BLD: ABNORMAL
EOSINOPHIL # BLD AUTO: 0.2 10E9/L (ref 0–0.7)
EOSINOPHIL NFR BLD AUTO: 4.2 %
ERYTHROCYTE [DISTWIDTH] IN BLOOD BY AUTOMATED COUNT: 15.9 % (ref 10–15)
GLUCOSE UR STRIP-MCNC: NEGATIVE MG/DL
HCT VFR BLD AUTO: 36.8 % (ref 35–47)
HGB BLD-MCNC: 12.6 G/DL (ref 11.7–15.7)
HGB UR QL STRIP: ABNORMAL
HYALINE CASTS #/AREA URNS LPF: 2 /LPF (ref 0–2)
IMM GRANULOCYTES # BLD: 0 10E9/L (ref 0–0.4)
IMM GRANULOCYTES NFR BLD: 0.7 %
KETONES UR STRIP-MCNC: NEGATIVE MG/DL
LEUKOCYTE ESTERASE UR QL STRIP: ABNORMAL
LYMPHOCYTES # BLD AUTO: 1.1 10E9/L (ref 0.8–5.3)
LYMPHOCYTES NFR BLD AUTO: 20.4 %
MCH RBC QN AUTO: 29.6 PG (ref 26.5–33)
MCHC RBC AUTO-ENTMCNC: 34.2 G/DL (ref 31.5–36.5)
MCV RBC AUTO: 87 FL (ref 78–100)
MONOCYTES # BLD AUTO: 0.4 10E9/L (ref 0–1.3)
MONOCYTES NFR BLD AUTO: 7.4 %
MUCOUS THREADS #/AREA URNS LPF: PRESENT /LPF
NEUTROPHILS # BLD AUTO: 3.7 10E9/L (ref 1.6–8.3)
NEUTROPHILS NFR BLD AUTO: 67.1 %
NITRATE UR QL: NEGATIVE
NRBC # BLD AUTO: 0 10*3/UL
NRBC BLD AUTO-RTO: 0 /100
PH UR STRIP: 5.5 PH (ref 5–7)
PLATELET # BLD AUTO: 187 10E9/L (ref 150–450)
RBC # BLD AUTO: 4.25 10E12/L (ref 3.8–5.2)
RBC #/AREA URNS AUTO: 1 /HPF (ref 0–2)
RESIDUAL VOLUME (RV) (EXTERNAL): 0
SOURCE: ABNORMAL
SP GR UR STRIP: 1.02 (ref 1–1.03)
SQUAMOUS #/AREA URNS AUTO: 1 /HPF (ref 0–1)
UROBILINOGEN UR STRIP-ACNC: 1 EU/DL (ref 0.2–1)
WBC # BLD AUTO: 5.5 10E9/L (ref 4–11)
WBC #/AREA URNS AUTO: 3 /HPF (ref 0–2)

## 2018-02-19 PROCEDURE — 36415 COLL VENOUS BLD VENIPUNCTURE: CPT

## 2018-02-19 PROCEDURE — 99203 OFFICE O/P NEW LOW 30 MIN: CPT | Mod: 25 | Performed by: PHYSICIAN ASSISTANT

## 2018-02-19 PROCEDURE — 51798 US URINE CAPACITY MEASURE: CPT | Performed by: PHYSICIAN ASSISTANT

## 2018-02-19 PROCEDURE — 81015 MICROSCOPIC EXAM OF URINE: CPT | Performed by: PHYSICIAN ASSISTANT

## 2018-02-19 PROCEDURE — 96401 CHEMO ANTI-NEOPL SQ/IM: CPT

## 2018-02-19 PROCEDURE — 85025 COMPLETE CBC W/AUTO DIFF WBC: CPT | Performed by: INTERNAL MEDICINE

## 2018-02-19 PROCEDURE — 25000128 H RX IP 250 OP 636: Mod: JW | Performed by: INTERNAL MEDICINE

## 2018-02-19 PROCEDURE — 81003 URINALYSIS AUTO W/O SCOPE: CPT | Performed by: PHYSICIAN ASSISTANT

## 2018-02-19 RX ORDER — TOLTERODINE 4 MG/1
4 CAPSULE, EXTENDED RELEASE ORAL DAILY
Qty: 90 CAPSULE | Refills: 3 | Status: SHIPPED | OUTPATIENT
Start: 2018-02-19 | End: 2018-06-08 | Stop reason: ALTCHOICE

## 2018-02-19 RX ORDER — ACYCLOVIR 400 MG/1
400 TABLET ORAL 2 TIMES DAILY
Qty: 60 TABLET | Refills: 5 | Status: SHIPPED | OUTPATIENT
Start: 2018-02-19 | End: 2018-09-28

## 2018-02-19 RX ADMIN — BORTEZOMIB 2.7 MG: 3.5 INJECTION, POWDER, LYOPHILIZED, FOR SOLUTION INTRAVENOUS; SUBCUTANEOUS at 15:13

## 2018-02-19 ASSESSMENT — PAIN SCALES - GENERAL
PAINLEVEL: NO PAIN (0)
PAINLEVEL: NO PAIN (0)

## 2018-02-19 NOTE — LETTER
2/19/2018         RE: Quiana Dunaway  [address] [facility] [address]        Dear Colleague,    Thank you for referring your patient, Quiana Dunaway, to the [facility]. Please see a copy of my visit note below.    Medical Assistant Note:  Quiana Dunaway presents today for lab only.    Patient seen by provider today: No.   present during visit today: Not Applicable.    Concerns: No Concerns.    Procedure:  Lab draw site: LAC, Needle type: BF, Gauge: 21.    Post Assessment:  Labs drawn without difficulty: Yes.    Discharge Plan:  Departure Mode: Ambulatory.    Face to Face Time: 5 minutes.    oBnny Sharif MA              Again, thank you for allowing me to participate in the care of your patient.        Sincerely,        Oncology Nurse

## 2018-02-19 NOTE — MR AVS SNAPSHOT
After Visit Summary   2/19/2018    Quiana Dunaway    MRN: [id number]           Patient Information     Date Of Birth          1935        Visit Information        Provider Department      2/19/2018 2:00 PM SH INFUSION CHAIR 2 [facility]        Today's Diagnoses     Multiple myeloma not having achieved remission (H)    -  1       Follow-ups after your visit        Your next 10 appointments already scheduled     Mar 05, 2018  2:00 PM CST   Level 2 with SH INFUSION CHAIR 13   [facility] ([facility])    [place] [id number]  [place] [address]   [phone]            Mar 05, 2018  2:30 PM CST   Return Visit with Alex Parry MD   [facility] ([facility])    [facility] [place] [id number]  [place] [address]   [phone]            Mar 12, 2018  2:00 PM CDT   Level 2 with SH INFUSION CHAIR 9   [facility] ([facility])    [facility] [place] [id number]  [place] [address]   [phone]            Mar 19, 2018  2:00 PM CDT   Level 2 with SH INFUSION CHAIR 5   [facility] ([facility])    [facility]  [address]  [place] [address]   [phone]            Mar 26, 2018  2:00 PM CDT   Level 2 with SH INFUSION CHAIR 9   [facility] ([facility])    [facility]  [address]  [place] [address]   [phone]            Apr 09, 2018  1:00 PM CDT   Level 2 with SH INFUSION CHAIR 20   [facility] ([facility])    [facility]  [address]  [place] [address]   [phone]            Apr 09, 2018  1:30 PM CDT    Return Visit with Alex Parry MD   [facility] ([facility])    [facility]  [address]  [facility] [id number] [phone]            Apr 09, 2018  2:30 PM CDT   Remote PPM Check with RIOS TECH1   [facility] ([facility])    [address]  [facility] [id number] [phone]           This appointment is for a remote check of your pacemaker.  This is not an appointment at the office.            Apr 09, 2018  3:15 PM CDT   [facility] EP RETURN with Alberto Worthington MD   [facility] ([facility])    [address]  [facility] [id number] [phone]            May 14, 2018  1:00 PM CDT   Return Visit with Leona Rodriguez PA-C   [facility] [place] ([facility] [place])    [address]  [address]  [facility] [id number] [phone]              Who to contact     If you have questions or need follow up information about today's clinic visit or your schedule please contact [facility] directly at [phone].  Normal or non-critical lab and imaging results will be communicated to you by PlayScapehart, letter or phone within 4 business days after the clinic has received the results. If you do not hear from us within 7 days, please contact the clinic through PlayScapehart or phone. If you have a critical or abnormal lab result, we will notify you by phone as soon as possible.  Submit refill requests through WHOOP or call your pharmacy and they will forward the refill request to us. Please allow 3 business days for your refill to be completed.          Additional Information About Your Visit        PlayScapehart Information     WHOOP gives you secure access to your electronic health record. If you see a primary care provider, you can also send messages to your care team  "and make appointments. If you have questions, please call your primary care clinic.  If you do not have a primary care provider, please call [phone] and they will assist you.        Care EveryWhere ID     This is your Care EveryWhere ID. This could be used by other organizations to access your [place] medical records  [id number]        Your Vitals Were     Pulse Temperature Respirations Height BMI (Body Mass Index)       66 97.9  F (36.6  C) (Oral) 18 1.575 m (5' 2\") 29.26 kg/m2        Blood Pressure from Last 3 Encounters:   02/19/18 134/69   02/19/18 130/84   02/12/18 133/65    Weight from Last 3 Encounters:   02/19/18 72.6 kg (160 lb)   02/19/18 73.9 kg (163 lb)   02/12/18 74 kg (163 lb 2.3 oz)              Today, you had the following     No orders found for display         Today's Medication Changes          These changes are accurate as of 2/19/18  4:25 PM.  If you have any questions, ask your nurse or doctor.               Start taking these medicines.        Dose/Directions    tolterodine 4 MG 24 hr capsule   Commonly known as:  DETROL LA   Used for:  Urinary frequency   Started by:  Leona Rodriguez PA-C        Dose:  4 mg   Take 1 capsule (4 mg) by mouth daily   Quantity:  90 capsule   Refills:  3         These medicines have changed or have updated prescriptions.        Dose/Directions    * ACYCLOVIR PO   This may have changed:  Another medication with the same name was added. Make sure you understand how and when to take each.        Dose:  400 mg   Take 400 mg by mouth 2 times daily   Refills:  0       * acyclovir 400 MG tablet   Commonly known as:  ZOVIRAX   This may have changed:  You were already taking a medication with the same name, and this prescription was added. Make sure you understand how and when to take each.   Used for:  Multiple myeloma not having achieved remission (H)        Dose:  400 mg   Take 1 tablet (400 mg) by mouth 2 times daily Viral Prophylaxis.   Quantity:  60 tablet "   Refills:  5       * Notice:  This list has 2 medication(s) that are the same as other medications prescribed for you. Read the directions carefully, and ask your doctor or other care provider to review them with you.         Where to get your medicines      These medications were sent to [facility] - [place], [place] - [address]  [address], [place] [address]     Phone:  [phone]     acyclovir 400 MG tablet    tolterodine 4 MG 24 hr capsule                Primary Care Provider Office Phone # Fax #    César Chung [phone]       [facility] [place] [address]  [place] [address]        Equal Access to Services     [facility] Center[facility] : Hadii althea quevedo hadasho Soterrieali, waaxda luqadaha, qaybta kaalmada adeegyada, waxbrenton rodriguez . So [facility] [phone].    ATENCIÓN: Si habla español, tiene a contreras disposición servicios gratuitos de asistencia lingüística. [facility] [phone].    We comply with applicable federal civil rights laws and [place] laws. We do not discriminate on the basis of race, color, national origin, age, disability, sex, sexual orientation, or gender identity.            Thank you!     Thank you for choosing [facility]  for your care. Our goal is always to provide you with excellent care. Hearing back from our patients is one way we can continue to improve our services. Please take a few minutes to complete the written survey that you may receive in the mail after your visit with us. Thank you!             Your Updated Medication List - Protect others around you: Learn how to safely use, store and throw away your medicines at www.disposemymeds.org.          This list is accurate as of 2/19/18  4:25 PM.  Always use your most recent med list.                   Brand Name Dispense Instructions for use Diagnosis    * ACYCLOVIR PO      Take 400 mg by mouth 2 times daily        *  acyclovir 400 MG tablet    ZOVIRAX    60 tablet    Take 1 tablet (400 mg) by mouth 2 times daily Viral Prophylaxis.    Multiple myeloma not having achieved remission (H)       aspirin 81 MG chewable tablet      Take 81 mg by mouth daily        calcium carbonate-vitamin D 600-400 MG-UNIT Chew     180 tablet    Take 1 chew tab by mouth 2 times daily    Multiple myeloma not having achieved remission (H)       * dexamethasone 4 MG tablet    DECADRON    30 tablet    Take 10 tablets (40 mg) by mouth every 7 days Days 1, 8, and 15.    Multiple myeloma not having achieved remission (H)       * dexamethasone 4 MG tablet    DECADRON    30 tablet    Take 10 tablets (40 mg) by mouth every 7 days for 3 doses Days 1, 8, and 15.    Multiple myeloma not having achieved remission (H)       escitalopram 5 MG tablet    LEXAPRO    90 tablet    TAKE 1 TABLET BY MOUTH DAILY    ROBER (generalized anxiety disorder)       IMODIUM A-D PO      Take by mouth as needed        LENalidomide 10 MG Caps capsule CHEMO    REVLIMID    14 capsule    Take 1 capsule (10 mg) by mouth daily for 14 days Days 1 through 14. Then off for 7 days.    Multiple myeloma not having achieved remission (H)       lisinopril 10 MG tablet    PRINIVIL/ZESTRIL    90 tablet    Take 1 tablet (10 mg) by mouth daily    Benign essential hypertension       LORazepam 0.5 MG tablet    ATIVAN    10 tablet    Take 1 tablet (0.5 mg) by mouth every 8 hours as needed (Anxiety, Nausea/Vomiting or Sleep)    Multiple myeloma not having achieved remission (H)       nitroGLYcerin 0.4 MG sublingual tablet    NITROSTAT    25 tablet    For chest pain place 1 tablet under the tongue every 5 minutes for 3 doses. If symptoms persist 5 minutes after 1st dose call 911.    Atypical chest pain       * order for DME     1 Units    Dispense one 4 wheeled walker with hand brakes and seat    Multiple myeloma not having achieved remission (H)       * order for DME     1 Units    Wheelchair.    Need for  assistance due to unsteady gait       prochlorperazine 10 MG tablet    COMPAZINE    30 tablet    Take 1 tablet (10 mg) by mouth every 6 hours as needed (Nausea/Vomiting)    Multiple myeloma not having achieved remission (H)       tolterodine 4 MG 24 hr capsule    DETROL LA    90 capsule    Take 1 capsule (4 mg) by mouth daily    Urinary frequency       * Notice:  This list has 6 medication(s) that are the same as other medications prescribed for you. Read the directions carefully, and ask your doctor or other care provider to review them with you.

## 2018-02-19 NOTE — PROGRESS NOTES
Oral Chemotherapy Monitoring Program    I spoke with Dr. Parry today regarding Quiana and the plan for her Revlimid. She started Day 1 of this cycle on 2/12. She took 3 days of therapy and held for fatigue beginning on 2/15. She has 11 pills remaining. Dr. Parry would like her to hold for a full week and restart on 2/22. This will be considered a new day 1 for her revlimid. He will see her on 3/5 to determine future plan. Patient has enough pills to last until 3/4 dose. This was discussed with Dr. Parry and he would like to continue this plan. Patient aware of plan and verbalized understanding. Dexamethasone and Velcade will stay the same.    Mil Weiner, PharmD, BCPS, BCOP  Hematology/Oncology Clinical Pharmacist  [facility]  [email or web address]

## 2018-02-19 NOTE — MR AVS SNAPSHOT
After Visit Summary   2/19/2018    Quiana Dunaway    MRN: [id number]           Patient Information     Date Of Birth          1935        Visit Information        Provider Department      2/19/2018 1:15 PM Nurse,  Oncology [facility]        Today's Diagnoses     Multiple myeloma not having achieved remission (H)    -  1       Follow-ups after your visit        Your next 10 appointments already scheduled     Feb 19, 2018  1:30 PM CST   New Patient Visit with Lenoa Rodriguez PA-C   [facility] [place] ([facility] [place])    [address]  [address]  [place] [address]   [phone]            Feb 19, 2018  2:00 PM CST   Level 2 with  INFUSION CHAIR 2   [facility] ([facility])    [facility]  [address]  [place] [address]   [phone]            Mar 05, 2018  2:00 PM CST   Level 2 with  INFUSION CHAIR 13   [facility] ([facility])    [facility]  [address]  [place] [address]   [phone]            Mar 05, 2018  2:30 PM CST   Return Visit with Alex Parry MD   [facility] ([facility])    [facility]  [address]  [place] [address]   [phone]            Mar 12, 2018  2:00 PM CDT   Level 2 with  INFUSION CHAIR 9   [facility] ([facility])    [facility]  [address]  [place] [address]   [phone]            Mar 19, 2018  2:00 PM CDT   Level 2 with  INFUSION CHAIR 5   [facility] ([facility])    [facility]  [address]  [place] [address]   [phone]            Mar 26, 2018  2:00 PM CDT   Level 2 with SH INFUSION  CHAIR 9   [facility] ([facility])    [facility] [place]  [address]  [place] [address] [phone]            Apr 09, 2018  1:00 PM CDT   Level 2 with SH INFUSION CHAIR 20   [facility] ([facility])    [facility] [place]  [address]  [place] [address] [phone]            Apr 09, 2018  2:30 PM CDT   Remote PPM Check with RIOS TECH1   [facility] ([facility])    [address]  [facility] [id number] [phone]           This appointment is for a remote check of your pacemaker.  This is not an appointment at the office.            Apr 09, 2018  3:15 PM CDT   [facility] EP RETURN with Alberto Worthington MD   [facility] ([facility])    [address]  [facility] [id number] [phone]              Who to contact     If you have questions or need follow up information about today's clinic visit or your schedule please contact [facility] directly at [phone].  Normal or non-critical lab and imaging results will be communicated to you by Zookalhart, letter or phone within 4 business days after the clinic has received the results. If you do not hear from us within 7 days, please contact the clinic through Zookalhart or phone. If you have a critical or abnormal lab result, we will notify you by phone as soon as possible.  Submit refill requests through Healthbox or call your pharmacy and they will forward the refill request to us. Please allow 3 business days for your refill to be completed.          Additional Information About Your Visit        Healthbox Information     Healthbox gives you secure access to your electronic health record. If you see a primary care provider, you can also send messages to your care team and make  appointments. If you have questions, please call your primary care clinic.  If you do not have a primary care provider, please call [phone] and they will assist you.        Care EveryWhere ID     This is your Care EveryWhere ID. This could be used by other organizations to access your [place] medical records  [id number]         Blood Pressure from Last 3 Encounters:   02/12/18 133/65   02/05/18 154/72   02/05/18 154/72    Weight from Last 3 Encounters:   02/12/18 74 kg (163 lb 2.3 oz)   02/05/18 74.3 kg (163 lb 12.8 oz)   02/05/18 74.3 kg (163 lb 12.8 oz)              We Performed the Following     CBC with platelets differential        Primary Care Provider Office Phone # Fax #    César Chung [phone]       [facility] [address] [facility] [address]  [place] [address]        Equal Access to Services     KEVON SANDOVAL : Hadii aad ku hadasho Soomaali, waaxda luqadaha, qaybta kaalmada adeegyada, waxay karenin haymarnien carlos rodriguez . So [facility] [phone].    ATENCIÓN: Si paulla español, tiene a contreras disposición servicios gratuitos de asistencia lingüística. Llame al [phone].    We comply with applicable federal civil rights laws and [place] laws. We do not discriminate on the basis of race, color, national origin, age, disability, sex, sexual orientation, or gender identity.            Thank you!     Thank you for choosing [facility]  for your care. Our goal is always to provide you with excellent care. Hearing back from our patients is one way we can continue to improve our services. Please take a few minutes to complete the written survey that you may receive in the mail after your visit with us. Thank you!             Your Updated Medication List - Protect others around you: Learn how to safely use, store and throw away your medicines at www.disposemymeds.org.          This list is accurate as of 2/19/18  1:24 PM.  Always use your most recent med list.                    Brand Name Dispense Instructions for use Diagnosis    ACYCLOVIR PO      Take 400 mg by mouth 2 times daily        aspirin 81 MG chewable tablet      Take 81 mg by mouth daily        calcium carbonate-vitamin D 600-400 MG-UNIT Chew     180 tablet    Take 1 chew tab by mouth 2 times daily    Multiple myeloma not having achieved remission (H)       * dexamethasone 4 MG tablet    DECADRON    30 tablet    Take 10 tablets (40 mg) by mouth every 7 days Days 1, 8, and 15.    Multiple myeloma not having achieved remission (H)       * dexamethasone 4 MG tablet    DECADRON    30 tablet    Take 10 tablets (40 mg) by mouth every 7 days for 3 doses Days 1, 8, and 15.    Multiple myeloma not having achieved remission (H)       escitalopram 5 MG tablet    LEXAPRO    90 tablet    TAKE 1 TABLET BY MOUTH DAILY    ROBER (generalized anxiety disorder)       IMODIUM A-D PO      Take by mouth as needed        LENalidomide 10 MG Caps capsule CHEMO    REVLIMID    14 capsule    Take 1 capsule (10 mg) by mouth daily for 14 days Days 1 through 14. Then off for 7 days.    Multiple myeloma not having achieved remission (H)       lisinopril 10 MG tablet    PRINIVIL/ZESTRIL    90 tablet    Take 1 tablet (10 mg) by mouth daily    Benign essential hypertension       LORazepam 0.5 MG tablet    ATIVAN    10 tablet    Take 1 tablet (0.5 mg) by mouth every 8 hours as needed (Anxiety, Nausea/Vomiting or Sleep)    Multiple myeloma not having achieved remission (H)       nitroGLYcerin 0.4 MG sublingual tablet    NITROSTAT    25 tablet    For chest pain place 1 tablet under the tongue every 5 minutes for 3 doses. If symptoms persist 5 minutes after 1st dose call 911.    Atypical chest pain       * order for DME     1 Units    Dispense one 4 wheeled walker with hand brakes and seat    Multiple myeloma not having achieved remission (H)       * order for DME     1 Units    Wheelchair.    Need for assistance due to unsteady gait       prochlorperazine  10 MG tablet    COMPAZINE    30 tablet    Take 1 tablet (10 mg) by mouth every 6 hours as needed (Nausea/Vomiting)    Multiple myeloma not having achieved remission (H)       * Notice:  This list has 4 medication(s) that are the same as other medications prescribed for you. Read the directions carefully, and ask your doctor or other care provider to review them with you.

## 2018-02-19 NOTE — PROGRESS NOTES
Infusion Nursing Note:  Quiana Dunaway presents today for velcade.    Patient seen by provider today: No   present during visit today: Not Applicable.    Note: Writer and Gadiel, Pharm D conferred with Dr. Parry regarding restart of revlimid/dex.  Orders per Dr. Parry to restart revlimid this Thursday and continue for 11 days until next appt on 3/5/18.  Continue  Dexamethasone today and Day 15.  Patient and Daughter-in-law agreed to plan.  Acyclovir reordered also.    Intravenous Access:  No Intravenous access/labs at this visit.    Treatment Conditions:  Lab Results   Component Value Date    HGB 12.6 02/19/2018     Lab Results   Component Value Date    WBC 5.5 02/19/2018      Lab Results   Component Value Date    ANEU 3.7 02/19/2018     Lab Results   Component Value Date     02/19/2018      Results reviewed, labs MET treatment parameters, ok to proceed with treatment.      Post Infusion Assessment:  Patient tolerated injection without incident.  Site patent and intact, free from redness, edema or discomfort.    Discharge Plan:   Discharge instructions reviewed with: Patient and Family.  Patient and/or family verbalized understanding of discharge instructions and all questions answered.  AVS to patient via Red[employer].  Patient will return 3/5/18 for next appointment.   Patient discharged in stable condition accompanied by: daughter-in-law.  Departure Mode: Ambulatory with cane.    Oralia Oliva RN

## 2018-02-19 NOTE — PROGRESS NOTES
Medical Assistant Note:  Quiana Dunaway presents today for lab only.    Patient seen by provider today: No.   present during visit today: Not Applicable.    Concerns: No Concerns.    Procedure:  Lab draw site: LAC, Needle type: BF, Gauge: 21.    Post Assessment:  Labs drawn without difficulty: Yes.    Discharge Plan:  Departure Mode: Ambulatory.    Face to Face Time: 5 minutes.    Bonny Sharif MA

## 2018-02-19 NOTE — PROGRESS NOTES
CC: Urinary urgency and frequency.    HPI: It is a pleasure to see Quiana Dunaway, a 82 year old female asked to be seen in consultation by Dr. Parry for the above. This problem has been going on for a few months and is progressively worsening. No symptoms  during the day, nocturia q1-2 hours. There is some urgency associated with symptoms, no urge incontinence. The patient does not wear protective pads. Rare leakage with coughing, sneezing, bending at the waist.      Denies any dysuria, gross hematuria, pyuria, sensation of incomplete bladder emptying, needing to strain or Crede to void, history of recent urinary tract infections or kidney stones. The patient does not have any neurologic issues. Denies constipation. Fluid consumption includes water daily, not much caffeine.  Undergoing treatment for MM.     Past Medical History:   Diagnosis Date     HTN (hypertension)      Multiple myeloma not having achieved remission (H) 7/18/2017     Pacemaker      Pancytopenia (H) 6/28/2017     Past Surgical History:   Procedure Laterality Date     BONE MARROW BIOPSY, BONE SPECIMEN, NEEDLE/TROCAR N/A 7/12/2017    Procedure: BIOPSY BONE MARROW;  BONE MARROW BIOPSY ;  Surgeon: Grace Vela MD;  [facility]     Current Outpatient Prescriptions   Medication Sig Dispense Refill     dexamethasone (DECADRON) 4 MG tablet Take 10 tablets (40 mg) by mouth every 7 days for 3 doses Days 1, 8, and 15. 30 tablet 0     LENalidomide (REVLIMID) 10 MG CAPS capsule CHEMO Take 1 capsule (10 mg) by mouth daily for 14 days Days 1 through 14. Then off for 7 days. 14 capsule 0     Loperamide HCl (IMODIUM A-D PO) Take by mouth as needed       escitalopram (LEXAPRO) 5 MG tablet TAKE 1 TABLET BY MOUTH DAILY 90 tablet 1     prochlorperazine (COMPAZINE) 10 MG tablet Take 1 tablet (10 mg) by mouth every 6 hours as needed (Nausea/Vomiting) 30 tablet 3     dexamethasone (DECADRON) 4 MG tablet Take 10 tablets (40 mg) by mouth every 7 days  "Days 1, 8, and 15. 30 tablet 0     LORazepam (ATIVAN) 0.5 MG tablet Take 1 tablet (0.5 mg) by mouth every 8 hours as needed (Anxiety, Nausea/Vomiting or Sleep) 10 tablet 3     ACYCLOVIR PO Take 400 mg by mouth 2 times daily       order for DME Wheelchair. 1 Units 0     lisinopril (PRINIVIL/ZESTRIL) 10 MG tablet Take 1 tablet (10 mg) by mouth daily 90 tablet 3     order for DME Dispense one 4 wheeled walker with hand brakes and seat 1 Units 0     calcium carbonate-vitamin D 600-400 MG-UNIT CHEW Take 1 chew tab by mouth 2 times daily 180 tablet 3     nitroglycerin (NITROSTAT) 0.4 MG sublingual tablet For chest pain place 1 tablet under the tongue every 5 minutes for 3 doses. If symptoms persist 5 minutes after 1st dose call 911. 25 tablet 0     aspirin 81 MG chewable tablet Take 81 mg by mouth daily        No Known Allergies  Family History: There is no h/o  malignancy.  There is no h/o urolithiasis.    Social History: The patient does not smoke cigarettes. Denies EtOH and illicit drug use.      ROS: A comprehensive 14 point ROS was obtained and otherwise negative except for that outlined above in the HPI.    PHYSICAL EXAM:   /84  Pulse 70  Ht 1.575 m (5' 2\")  Wt 73.9 kg (163 lb)  Breastfeeding? No  BMI 29.81 kg/m2  GENERAL: Well groomed, well developed, well nourished female in NAD.  HEENT: EOMI, AT, NC.  SKIN: Warm to touch, dry.  No visible rashes or lesions on examined areas.  RESP: No increased respiratory effort.   LYMPH: No LE edema.  ABD: Soft, NT, ND.  No CVAT bilaterally.  MS: Full ROM in extremities.  NEURO: Alert and oriented x 3.  PSYCH: Normal mood and affect, pleasant and agreeable during interview and exam.    PVR: 0 mL.  U/A: trace blood    IMAGING: None      ASSESSMENT/PLAN:  Ms. Quiana Dunaway is a 82 year old female with nocturia. We discussed potential management options including continued observation, behavioral modifications such as timed voiding, double voiding, pelvic floor " physical therapy, medications (anticholinergics or Myrbetriq), intravesical Botox injections, PTNS, InterStim, or additional evaluation with cystoscopy and/or video urodynamics to further evaluate the anatomy and function of the lower urinary tract. The patient has elected to proceed with the following:  - Detrol LA 4mg. Rx sent to patient's pharmacy. Side effects discussed.  - Reviewed behavioral therapies, such as timed voiding, pelvic floor relaxation while voiding and not voiding in a hurry.  Consider possible pelvic floor physical therapy and biofeedback in the future.  -Premarin (Pt would like to think about it), this would treat atrophic vaginitis and urethral changes which may impact her pelvic discomfort and improve her urinary urgency  -Send Micro    Follow up in 3 months for reassessment.      Consider urodynamics and intravesical examination with cystoscopy with Dr. Alvarado in the future if the patient fails medication therapy.      I have enjoyed participating in the medical care of this patient.  Please do not hesitate to contact me with any questions or concerns.     Leona Rodriguez PA-C  [facility]    30 min spent face to face with the patient, >50% of that time spent on counseling and coordination of care.

## 2018-02-19 NOTE — MR AVS SNAPSHOT
After Visit Summary   2/19/2018    Quiana Dunaway    MRN: [id number]           Patient Information     Date Of Birth          1935        Visit Information        Provider Department      2/19/2018 1:30 PM Leona Rodriguez PA-C [facility] [place]        Today's Diagnoses     Dysuria    -  1    Urinary frequency          Care Instructions    Below is a list of things that can irritate the bladder and should be avoided:      Caffeinated soft drinks.    Coffee.    Tea.    Chocolate.    Tomato-based foods.    Acidic juices and fruits. (includes cranberry juice)    Alcohol.    Carbonated drinks.    Aspartame/Nutrasweet.    Detrol LA 4mg was sent to the pharmacy.               Follow-ups after your visit        Your next 10 appointments already scheduled     Mar 05, 2018  2:00 PM CST   Level 2 with  INFUSION CHAIR 13   [facility] ([facility])    [facility]  [address]  [place] [address]   [phone]            Mar 05, 2018  2:30 PM CST   Return Visit with Alex Parry MD   [facility] ([facility])    [facility]  [address]  [place] [address]   [phone]            Mar 12, 2018  2:00 PM CDT   Level 2 with  INFUSION CHAIR 9   [facility] ([facility])    [facility]  [address]  [place] [address]   [phone]            Mar 19, 2018  2:00 PM CDT   Level 2 with  INFUSION CHAIR 5   [facility] ([facility])    [facility]  [address]  [place] [address]   [phone]            Mar 26, 2018  2:00 PM CDT   Level 2 with  INFUSION CHAIR 9   [facility] ([facility])    [facility]  [place]  [address]  [place] [address]   [phone]            Apr 09, 2018  1:00 PM CDT   Level 2 with  INFUSION CHAIR 20   [facility] ([facility])    [facility] [place]  [address]  [place] [address]   [phone]            Apr 09, 2018  1:30 PM CDT   Return Visit with Alex Parry MD   [facility] ([facility])    [facility] [place]  [address]  [place] [address]   [phone]            Apr 09, 2018  2:30 PM CDT   Remote PPM Check with RIOS TECH1   [facility] ([facility])    [address]  [place] [address] [phone]           This appointment is for a remote check of your pacemaker.  This is not an appointment at the office.            Apr 09, 2018  3:15 PM CDT   P EP RETURN with Alberto Worthington MD   [facility] ([facility])    [address]  [place] [address] [phone]            May 14, 2018  1:00 PM CDT   Return Visit with Leona Rodriguez PA-C   [facility] [place] ([facility] [place])    [address]  [address]  [place] [address] [phone]              Who to contact     If you have questions or need follow up information about today's clinic visit or your schedule please contact [facility] [place] directly at [phone].  Normal or non-critical lab and imaging results will be communicated to you by MyChart, letter or phone within 4 business days after the clinic has received the results. If you do not hear from us within 7 days, please contact the clinic through MyChart or phone. If you have a critical or abnormal lab result, we will notify you by phone as soon as possible.  Submit refill requests through [employer]  "or call your pharmacy and they will forward the refill request to us. Please allow 3 business days for your refill to be completed.          Additional Information About Your Visit        Bplatshart Information     [email or web address] gives you secure access to your electronic health record. If you see a primary care provider, you can also send messages to your care team and make appointments. If you have questions, please call your primary care clinic.  If you do not have a primary care provider, please call [phone] and they will assist you.        Care EveryWhere ID     This is your Care EveryWhere ID. This could be used by other organizations to access your [place] medical records  [id number]        Your Vitals Were     Pulse Height Breastfeeding? BMI (Body Mass Index)          70 1.575 m (5' 2\") No 29.81 kg/m2         Blood Pressure from Last 3 Encounters:   02/19/18 130/84   02/12/18 133/65   02/05/18 154/72    Weight from Last 3 Encounters:   02/19/18 73.9 kg (163 lb)   02/12/18 74 kg (163 lb 2.3 oz)   02/05/18 74.3 kg (163 lb 12.8 oz)              We Performed the Following     MEASURE POST-VOID RESIDUAL URINE/BLADDER CAPACITY, US NON-IMAGING     UA without Microscopic     Urine Micro Urologic Phys          Today's Medication Changes          These changes are accurate as of 2/19/18  2:01 PM.  If you have any questions, ask your nurse or doctor.               Start taking these medicines.        Dose/Directions    tolterodine 4 MG 24 hr capsule   Commonly known as:  DETROL LA   Used for:  Urinary frequency   Started by:  Leona Rodriguez PA-C        Dose:  4 mg   Take 1 capsule (4 mg) by mouth daily   Quantity:  90 capsule   Refills:  3            Where to get your medicines      These medications were sent to [facility] - [address]  [address] [address]     Phone:  [phone]     tolterodine 4 MG 24 hr capsule                Primary Care Provider Office " Phone # Fax #    César Chung [phone]       [facility] [address] [facility] [address]  [facility] [address]        Equal Access to Services     LILLIANA SANDOVAL : Hadmary kate quevedo hadivelisseo Soomaali, waaxda luqadaha, qaybta kaalmada adeegyada, phoenix hortonfrancisco careydenzel young jennifer villeda. So [facility] [phone].    ATENCIÓN: Si habla español, tiene a contreras disposición servicios gratuitos de asistencia lingüística. Llame al [phone].    We comply with applicable federal civil rights laws and [place] laws. We do not discriminate on the basis of race, color, national origin, age, disability, sex, sexual orientation, or gender identity.            Thank you!     Thank you for choosing [facility]  for your care. Our goal is always to provide you with excellent care. Hearing back from our patients is one way we can continue to improve our services. Please take a few minutes to complete the written survey that you may receive in the mail after your visit with us. Thank you!             Your Updated Medication List - Protect others around you: Learn how to safely use, store and throw away your medicines at www.disposemymeds.org.          This list is accurate as of 2/19/18  2:01 PM.  Always use your most recent med list.                   Brand Name Dispense Instructions for use Diagnosis    ACYCLOVIR PO      Take 400 mg by mouth 2 times daily        aspirin 81 MG chewable tablet      Take 81 mg by mouth daily        calcium carbonate-vitamin D 600-400 MG-UNIT Chew     180 tablet    Take 1 chew tab by mouth 2 times daily    Multiple myeloma not having achieved remission (H)       * dexamethasone 4 MG tablet    DECADRON    30 tablet    Take 10 tablets (40 mg) by mouth every 7 days Days 1, 8, and 15.    Multiple myeloma not having achieved remission (H)       * dexamethasone 4 MG tablet    DECADRON    30 tablet    Take 10 tablets (40 mg) by mouth every 7 days for 3 doses  Days 1, 8, and 15.    Multiple myeloma not having achieved remission (H)       escitalopram 5 MG tablet    LEXAPRO    90 tablet    TAKE 1 TABLET BY MOUTH DAILY    ROBER (generalized anxiety disorder)       IMODIUM A-D PO      Take by mouth as needed        LENalidomide 10 MG Caps capsule CHEMO    REVLIMID    14 capsule    Take 1 capsule (10 mg) by mouth daily for 14 days Days 1 through 14. Then off for 7 days.    Multiple myeloma not having achieved remission (H)       lisinopril 10 MG tablet    PRINIVIL/ZESTRIL    90 tablet    Take 1 tablet (10 mg) by mouth daily    Benign essential hypertension       LORazepam 0.5 MG tablet    ATIVAN    10 tablet    Take 1 tablet (0.5 mg) by mouth every 8 hours as needed (Anxiety, Nausea/Vomiting or Sleep)    Multiple myeloma not having achieved remission (H)       nitroGLYcerin 0.4 MG sublingual tablet    NITROSTAT    25 tablet    For chest pain place 1 tablet under the tongue every 5 minutes for 3 doses. If symptoms persist 5 minutes after 1st dose call 911.    Atypical chest pain       * order for DME     1 Units    Dispense one 4 wheeled walker with hand brakes and seat    Multiple myeloma not having achieved remission (H)       * order for DME     1 Units    Wheelchair.    Need for assistance due to unsteady gait       prochlorperazine 10 MG tablet    COMPAZINE    30 tablet    Take 1 tablet (10 mg) by mouth every 6 hours as needed (Nausea/Vomiting)    Multiple myeloma not having achieved remission (H)       tolterodine 4 MG 24 hr capsule    DETROL LA    90 capsule    Take 1 capsule (4 mg) by mouth daily    Urinary frequency       * Notice:  This list has 4 medication(s) that are the same as other medications prescribed for you. Read the directions carefully, and ask your doctor or other care provider to review them with you.

## 2018-02-19 NOTE — PATIENT INSTRUCTIONS
Below is a list of things that can irritate the bladder and should be avoided:      Caffeinated soft drinks.    Coffee.    Tea.    Chocolate.    Tomato-based foods.    Acidic juices and fruits. (includes cranberry juice)    Alcohol.    Carbonated drinks.    Aspartame/Nutrasweet.    Detrol LA 4mg was sent to the pharmacy.

## 2018-02-19 NOTE — NURSING NOTE
Chief Complaint   Patient presents with     Urinary Frequency     Patient is here for frequency. She stated that during the day she doesnt go as often.     Nocturia     Patient is up about every hour.      Love Dubon LPN 1:39 PM February 19, 2018

## 2018-02-19 NOTE — LETTER
2/19/2018       RE: Quiana Dunaway  [address] [facility] [address]     Dear Colleague,    Thank you for referring your patient, Quiana Dunaway, to the [facility] [place] at [facility]. Please see a copy of my visit note below.    CC: Urinary urgency and frequency.    HPI: It is a pleasure to see Quiana Dunaway, a 82 year old female asked to be seen in consultation by Dr. Parry for the above. This problem has been going on for a few months and is progressively worsening. No symptoms  during the day, nocturia q1-2 hours. There is some urgency associated with symptoms, no urge incontinence. The patient does not wear protective pads. Rare leakage with coughing, sneezing, bending at the waist.      Denies any dysuria, gross hematuria, pyuria, sensation of incomplete bladder emptying, needing to strain or Crede to void, history of recent urinary tract infections or kidney stones. The patient does not have any neurologic issues. Denies constipation. Fluid consumption includes water daily, not much caffeine.  Undergoing treatment for MM.     Past Medical History:   Diagnosis Date     HTN (hypertension)      Multiple myeloma not having achieved remission (H) 7/18/2017     Pacemaker      Pancytopenia (H) 6/28/2017     Past Surgical History:   Procedure Laterality Date     BONE MARROW BIOPSY, BONE SPECIMEN, NEEDLE/TROCAR N/A 7/12/2017    Procedure: BIOPSY BONE MARROW;  BONE MARROW BIOPSY ;  Surgeon: Grace Vela MD;  Location: [facility]     Current Outpatient Prescriptions   Medication Sig Dispense Refill     dexamethasone (DECADRON) 4 MG tablet Take 10 tablets (40 mg) by mouth every 7 days for 3 doses Days 1, 8, and 15. 30 tablet 0     LENalidomide (REVLIMID) 10 MG CAPS capsule CHEMO Take 1 capsule (10 mg) by mouth daily for 14 days Days 1 through 14. Then off for 7 days. 14 capsule 0     Loperamide HCl (IMODIUM A-D PO) Take by  "mouth as needed       escitalopram (LEXAPRO) 5 MG tablet TAKE 1 TABLET BY MOUTH DAILY 90 tablet 1     prochlorperazine (COMPAZINE) 10 MG tablet Take 1 tablet (10 mg) by mouth every 6 hours as needed (Nausea/Vomiting) 30 tablet 3     dexamethasone (DECADRON) 4 MG tablet Take 10 tablets (40 mg) by mouth every 7 days Days 1, 8, and 15. 30 tablet 0     LORazepam (ATIVAN) 0.5 MG tablet Take 1 tablet (0.5 mg) by mouth every 8 hours as needed (Anxiety, Nausea/Vomiting or Sleep) 10 tablet 3     ACYCLOVIR PO Take 400 mg by mouth 2 times daily       order for DME Wheelchair. 1 Units 0     lisinopril (PRINIVIL/ZESTRIL) 10 MG tablet Take 1 tablet (10 mg) by mouth daily 90 tablet 3     order for DME Dispense one 4 wheeled walker with hand brakes and seat 1 Units 0     calcium carbonate-vitamin D 600-400 MG-UNIT CHEW Take 1 chew tab by mouth 2 times daily 180 tablet 3     nitroglycerin (NITROSTAT) 0.4 MG sublingual tablet For chest pain place 1 tablet under the tongue every 5 minutes for 3 doses. If symptoms persist 5 minutes after 1st dose call 911. 25 tablet 0     aspirin 81 MG chewable tablet Take 81 mg by mouth daily        No Known Allergies  Family History: There is no h/o  malignancy.  There is no h/o urolithiasis.    Social History: The patient does not smoke cigarettes. Denies EtOH and illicit drug use.      ROS: A comprehensive 14 point ROS was obtained and otherwise negative except for that outlined above in the HPI.    PHYSICAL EXAM:   /84  Pulse 70  Ht 1.575 m (5' 2\")  Wt 73.9 kg (163 lb)  Breastfeeding? No  BMI 29.81 kg/m2  GENERAL: Well groomed, well developed, well nourished female in NAD.  HEENT: EOMI, AT, NC.  SKIN: Warm to touch, dry.  No visible rashes or lesions on examined areas.  RESP: No increased respiratory effort.   LYMPH: No LE edema.  ABD: Soft, NT, ND.  No CVAT bilaterally.  MS: Full ROM in extremities.  NEURO: Alert and oriented x 3.  PSYCH: Normal mood and affect, pleasant and " agreeable during interview and exam.    PVR: 0 mL.  U/A: trace blood    IMAGING: None      ASSESSMENT/PLAN:  Ms. Quiana Dunaway is a 82 year old female with nocturia. We discussed potential management options including continued observation, behavioral modifications such as timed voiding, double voiding, pelvic floor physical therapy, medications (anticholinergics or Myrbetriq), intravesical Botox injections, PTNS, InterStim, or additional evaluation with cystoscopy and/or video urodynamics to further evaluate the anatomy and function of the lower urinary tract. The patient has elected to proceed with the following:  - Detrol LA 4mg. Rx sent to patient's pharmacy. Side effects discussed.  - Reviewed behavioral therapies, such as timed voiding, pelvic floor relaxation while voiding and not voiding in a hurry.  Consider possible pelvic floor physical therapy and biofeedback in the future.  -Premarin (Pt would like to think about it), this would treat atrophic vaginitis and urethral changes which may impact her pelvic discomfort and improve her urinary urgency  -Send Micro    Follow up in 3 months for reassessment.      Consider urodynamics and intravesical examination with cystoscopy with Dr. Alvarado in the future if the patient fails medication therapy.      I have enjoyed participating in the medical care of this patient.  Please do not hesitate to contact me with any questions or concerns.       30 min spent face to face with the patient, >50% of that time spent on counseling and coordination of care.       Again, thank you for allowing me to participate in the care of your patient.      Sincerely,    Leona Rodriguez PA-C, MALINDA

## 2018-02-21 ENCOUNTER — TELEPHONE (OUTPATIENT)
Dept: ONCOLOGY | Facility: CLINIC | Age: 83
End: 2018-02-21

## 2018-02-21 NOTE — TELEPHONE ENCOUNTER
Patient's daughter called concerned after receiving a call from patient this morning with complaints of fatigue, nausea, and diarrhea about restarting her Revlimid tomorrow as scheduled.     I called patient at the number provided to me by her daughter [phone] and spoke with patient. She states that she had diarrhea yesterday 2 episode took Imodium that has improved, but continues to be fatigues and nauseas with no relief from Compazine or Ativan. She is drinking fluids and 7 up with crackers currently and wants to hold Revlimid if Dr. Parry is ok with it.    I spoke with Dr. Parry and he advise to hold until she is seen on 3/5 and he will reevaluate dose and they can discuss needed adjustments.    Patient aware and verbalizes understanding. Alecia Briggs

## 2018-03-05 ENCOUNTER — ONCOLOGY VISIT (OUTPATIENT)
Dept: ONCOLOGY | Facility: CLINIC | Age: 83
End: 2018-03-05
Attending: INTERNAL MEDICINE
Payer: MEDICARE

## 2018-03-05 ENCOUNTER — HOSPITAL ENCOUNTER (OUTPATIENT)
Facility: CLINIC | Age: 83
Setting detail: SPECIMEN
Discharge: HOME | End: 2018-03-05
Attending: INTERNAL MEDICINE | Admitting: INTERNAL MEDICINE
Payer: MEDICARE

## 2018-03-05 ENCOUNTER — INFUSION THERAPY VISIT (OUTPATIENT)
Dept: INFUSION THERAPY | Facility: CLINIC | Age: 83
End: 2018-03-05
Attending: INTERNAL MEDICINE
Payer: MEDICARE

## 2018-03-05 ENCOUNTER — DOCUMENTATION ONLY (OUTPATIENT)
Dept: PHARMACY | Facility: CLINIC | Age: 83
End: 2018-03-05

## 2018-03-05 VITALS
OXYGEN SATURATION: 93 % | TEMPERATURE: 97.7 F | DIASTOLIC BLOOD PRESSURE: 61 MMHG | SYSTOLIC BLOOD PRESSURE: 121 MMHG | BODY MASS INDEX: 29.37 KG/M2 | WEIGHT: 159.61 LBS | HEIGHT: 62 IN | RESPIRATION RATE: 16 BRPM | HEART RATE: 64 BPM

## 2018-03-05 VITALS
HEART RATE: 79 BPM | DIASTOLIC BLOOD PRESSURE: 73 MMHG | SYSTOLIC BLOOD PRESSURE: 112 MMHG | BODY MASS INDEX: 29.19 KG/M2 | OXYGEN SATURATION: 93 % | WEIGHT: 159.6 LBS | TEMPERATURE: 98.5 F | RESPIRATION RATE: 16 BRPM

## 2018-03-05 DIAGNOSIS — C90.00 MULTIPLE MYELOMA NOT HAVING ACHIEVED REMISSION (H): Primary | ICD-10-CM

## 2018-03-05 LAB
ALBUMIN SERPL-MCNC: 3.5 G/DL (ref 3.4–5)
ALP SERPL-CCNC: 100 U/L (ref 40–150)
ALT SERPL W P-5'-P-CCNC: 20 U/L (ref 0–50)
ANION GAP SERPL CALCULATED.3IONS-SCNC: 6 MMOL/L (ref 3–14)
AST SERPL W P-5'-P-CCNC: 14 U/L (ref 0–45)
BASOPHILS # BLD AUTO: 0.1 10E9/L (ref 0–0.2)
BASOPHILS NFR BLD AUTO: 1 %
BILIRUB SERPL-MCNC: 0.4 MG/DL (ref 0.2–1.3)
BUN SERPL-MCNC: 18 MG/DL (ref 7–30)
CALCIUM SERPL-MCNC: 8.6 MG/DL (ref 8.5–10.1)
CHLORIDE SERPL-SCNC: 105 MMOL/L (ref 94–109)
CO2 SERPL-SCNC: 30 MMOL/L (ref 20–32)
CREAT SERPL-MCNC: 0.62 MG/DL (ref 0.52–1.04)
DIFFERENTIAL METHOD BLD: ABNORMAL
EOSINOPHIL # BLD AUTO: 0.2 10E9/L (ref 0–0.7)
EOSINOPHIL NFR BLD AUTO: 3.8 %
ERYTHROCYTE [DISTWIDTH] IN BLOOD BY AUTOMATED COUNT: 15.5 % (ref 10–15)
GFR SERPL CREATININE-BSD FRML MDRD: >90 ML/MIN/1.7M2
GLUCOSE SERPL-MCNC: 105 MG/DL (ref 70–99)
HCT VFR BLD AUTO: 37.2 % (ref 35–47)
HGB BLD-MCNC: 12.3 G/DL (ref 11.7–15.7)
IMM GRANULOCYTES # BLD: 0 10E9/L (ref 0–0.4)
IMM GRANULOCYTES NFR BLD: 0.2 %
LYMPHOCYTES # BLD AUTO: 1.1 10E9/L (ref 0.8–5.3)
LYMPHOCYTES NFR BLD AUTO: 22.3 %
MCH RBC QN AUTO: 29 PG (ref 26.5–33)
MCHC RBC AUTO-ENTMCNC: 33.1 G/DL (ref 31.5–36.5)
MCV RBC AUTO: 88 FL (ref 78–100)
MONOCYTES # BLD AUTO: 0.7 10E9/L (ref 0–1.3)
MONOCYTES NFR BLD AUTO: 13.3 %
NEUTROPHILS # BLD AUTO: 3 10E9/L (ref 1.6–8.3)
NEUTROPHILS NFR BLD AUTO: 59.4 %
PLATELET # BLD AUTO: 278 10E9/L (ref 150–450)
POTASSIUM SERPL-SCNC: 3.3 MMOL/L (ref 3.4–5.3)
PROT SERPL-MCNC: 7 G/DL (ref 6.8–8.8)
RBC # BLD AUTO: 4.24 10E12/L (ref 3.8–5.2)
SODIUM SERPL-SCNC: 141 MMOL/L (ref 133–144)
WBC # BLD AUTO: 5 10E9/L (ref 4–11)

## 2018-03-05 PROCEDURE — 25000132 ZZH RX MED GY IP 250 OP 250 PS 637: Mod: GY | Performed by: INTERNAL MEDICINE

## 2018-03-05 PROCEDURE — 99214 OFFICE O/P EST MOD 30 MIN: CPT | Performed by: INTERNAL MEDICINE

## 2018-03-05 PROCEDURE — 96401 CHEMO ANTI-NEOPL SQ/IM: CPT

## 2018-03-05 PROCEDURE — 25000128 H RX IP 250 OP 636: Mod: JW | Performed by: INTERNAL MEDICINE

## 2018-03-05 PROCEDURE — 36415 COLL VENOUS BLD VENIPUNCTURE: CPT

## 2018-03-05 PROCEDURE — 85025 COMPLETE CBC W/AUTO DIFF WBC: CPT | Performed by: INTERNAL MEDICINE

## 2018-03-05 PROCEDURE — A9270 NON-COVERED ITEM OR SERVICE: HCPCS | Mod: GY | Performed by: INTERNAL MEDICINE

## 2018-03-05 PROCEDURE — 83883 ASSAY NEPHELOMETRY NOT SPEC: CPT | Performed by: INTERNAL MEDICINE

## 2018-03-05 PROCEDURE — 80053 COMPREHEN METABOLIC PANEL: CPT | Performed by: INTERNAL MEDICINE

## 2018-03-05 PROCEDURE — 82784 ASSAY IGA/IGD/IGG/IGM EACH: CPT | Performed by: INTERNAL MEDICINE

## 2018-03-05 PROCEDURE — G0463 HOSPITAL OUTPT CLINIC VISIT: HCPCS | Mod: 25

## 2018-03-05 PROCEDURE — 84165 PROTEIN E-PHORESIS SERUM: CPT | Performed by: INTERNAL MEDICINE

## 2018-03-05 PROCEDURE — 00000402 ZZHCL STATISTIC TOTAL PROTEIN: Performed by: INTERNAL MEDICINE

## 2018-03-05 RX ORDER — EPINEPHRINE 1 MG/ML
0.3 INJECTION, SOLUTION INTRAMUSCULAR; SUBCUTANEOUS EVERY 5 MIN PRN
Status: CANCELLED | OUTPATIENT
Start: 2018-03-05

## 2018-03-05 RX ORDER — POTASSIUM CHLORIDE 750 MG/1
40 TABLET, EXTENDED RELEASE ORAL ONCE
Status: COMPLETED | OUTPATIENT
Start: 2018-03-05 | End: 2018-03-05

## 2018-03-05 RX ORDER — ALBUTEROL SULFATE 0.83 MG/ML
2.5 SOLUTION RESPIRATORY (INHALATION)
Status: CANCELLED | OUTPATIENT
Start: 2018-03-12

## 2018-03-05 RX ORDER — ALBUTEROL SULFATE 90 UG/1
1-2 AEROSOL, METERED RESPIRATORY (INHALATION)
Status: CANCELLED
Start: 2018-03-05

## 2018-03-05 RX ORDER — METHYLPREDNISOLONE SODIUM SUCCINATE 125 MG/2ML
125 INJECTION, POWDER, LYOPHILIZED, FOR SOLUTION INTRAMUSCULAR; INTRAVENOUS
Status: CANCELLED
Start: 2018-03-12

## 2018-03-05 RX ORDER — METHYLPREDNISOLONE SODIUM SUCCINATE 125 MG/2ML
125 INJECTION, POWDER, LYOPHILIZED, FOR SOLUTION INTRAMUSCULAR; INTRAVENOUS
Status: CANCELLED
Start: 2018-03-05

## 2018-03-05 RX ORDER — LORAZEPAM 2 MG/ML
0.5 INJECTION INTRAMUSCULAR EVERY 4 HOURS PRN
Status: CANCELLED
Start: 2018-03-19

## 2018-03-05 RX ORDER — MEPERIDINE HYDROCHLORIDE 25 MG/ML
25 INJECTION INTRAMUSCULAR; INTRAVENOUS; SUBCUTANEOUS EVERY 30 MIN PRN
Status: CANCELLED | OUTPATIENT
Start: 2018-03-05

## 2018-03-05 RX ORDER — DIPHENHYDRAMINE HYDROCHLORIDE 50 MG/ML
50 INJECTION INTRAMUSCULAR; INTRAVENOUS
Status: CANCELLED
Start: 2018-03-12

## 2018-03-05 RX ORDER — SODIUM CHLORIDE 9 MG/ML
1000 INJECTION, SOLUTION INTRAVENOUS CONTINUOUS PRN
Status: CANCELLED
Start: 2018-03-12

## 2018-03-05 RX ORDER — EPINEPHRINE 1 MG/ML
0.3 INJECTION, SOLUTION INTRAMUSCULAR; SUBCUTANEOUS EVERY 5 MIN PRN
Status: CANCELLED | OUTPATIENT
Start: 2018-03-12

## 2018-03-05 RX ORDER — LORAZEPAM 2 MG/ML
0.5 INJECTION INTRAMUSCULAR EVERY 4 HOURS PRN
Status: CANCELLED
Start: 2018-03-12

## 2018-03-05 RX ORDER — EPINEPHRINE 0.3 MG/.3ML
0.3 INJECTION SUBCUTANEOUS EVERY 5 MIN PRN
Status: CANCELLED | OUTPATIENT
Start: 2018-03-12

## 2018-03-05 RX ORDER — DIPHENHYDRAMINE HYDROCHLORIDE 50 MG/ML
50 INJECTION INTRAMUSCULAR; INTRAVENOUS
Status: CANCELLED
Start: 2018-03-05

## 2018-03-05 RX ORDER — ALBUTEROL SULFATE 0.83 MG/ML
2.5 SOLUTION RESPIRATORY (INHALATION)
Status: CANCELLED | OUTPATIENT
Start: 2018-03-19

## 2018-03-05 RX ORDER — MEPERIDINE HYDROCHLORIDE 25 MG/ML
25 INJECTION INTRAMUSCULAR; INTRAVENOUS; SUBCUTANEOUS EVERY 30 MIN PRN
Status: CANCELLED | OUTPATIENT
Start: 2018-03-12

## 2018-03-05 RX ORDER — ALBUTEROL SULFATE 90 UG/1
1-2 AEROSOL, METERED RESPIRATORY (INHALATION)
Status: CANCELLED
Start: 2018-03-19

## 2018-03-05 RX ORDER — EPINEPHRINE 0.3 MG/.3ML
0.3 INJECTION SUBCUTANEOUS EVERY 5 MIN PRN
Status: CANCELLED | OUTPATIENT
Start: 2018-03-05

## 2018-03-05 RX ORDER — METHYLPREDNISOLONE SODIUM SUCCINATE 125 MG/2ML
125 INJECTION, POWDER, LYOPHILIZED, FOR SOLUTION INTRAMUSCULAR; INTRAVENOUS
Status: CANCELLED
Start: 2018-03-19

## 2018-03-05 RX ORDER — DIPHENHYDRAMINE HYDROCHLORIDE 50 MG/ML
50 INJECTION INTRAMUSCULAR; INTRAVENOUS
Status: CANCELLED
Start: 2018-03-19

## 2018-03-05 RX ORDER — MEPERIDINE HYDROCHLORIDE 25 MG/ML
25 INJECTION INTRAMUSCULAR; INTRAVENOUS; SUBCUTANEOUS EVERY 30 MIN PRN
Status: CANCELLED | OUTPATIENT
Start: 2018-03-19

## 2018-03-05 RX ORDER — EPINEPHRINE 0.3 MG/.3ML
0.3 INJECTION SUBCUTANEOUS EVERY 5 MIN PRN
Status: CANCELLED | OUTPATIENT
Start: 2018-03-19

## 2018-03-05 RX ORDER — SODIUM CHLORIDE 9 MG/ML
1000 INJECTION, SOLUTION INTRAVENOUS CONTINUOUS PRN
Status: CANCELLED
Start: 2018-03-19

## 2018-03-05 RX ORDER — ALBUTEROL SULFATE 0.83 MG/ML
2.5 SOLUTION RESPIRATORY (INHALATION)
Status: CANCELLED | OUTPATIENT
Start: 2018-03-05

## 2018-03-05 RX ORDER — EPINEPHRINE 1 MG/ML
0.3 INJECTION, SOLUTION INTRAMUSCULAR; SUBCUTANEOUS EVERY 5 MIN PRN
Status: CANCELLED | OUTPATIENT
Start: 2018-03-19

## 2018-03-05 RX ORDER — ALBUTEROL SULFATE 90 UG/1
1-2 AEROSOL, METERED RESPIRATORY (INHALATION)
Status: CANCELLED
Start: 2018-03-12

## 2018-03-05 RX ORDER — SODIUM CHLORIDE 9 MG/ML
1000 INJECTION, SOLUTION INTRAVENOUS CONTINUOUS PRN
Status: CANCELLED
Start: 2018-03-05

## 2018-03-05 RX ORDER — LORAZEPAM 2 MG/ML
0.5 INJECTION INTRAMUSCULAR EVERY 4 HOURS PRN
Status: CANCELLED
Start: 2018-03-05

## 2018-03-05 RX ADMIN — BORTEZOMIB 2.7 MG: 3.5 INJECTION, POWDER, LYOPHILIZED, FOR SOLUTION INTRAVENOUS; SUBCUTANEOUS at 14:43

## 2018-03-05 RX ADMIN — POTASSIUM CHLORIDE 40 MEQ: 750 TABLET, FILM COATED, EXTENDED RELEASE ORAL at 14:47

## 2018-03-05 ASSESSMENT — PAIN SCALES - GENERAL
PAINLEVEL: NO PAIN (0)
PAINLEVEL: NO PAIN (0)

## 2018-03-05 NOTE — PROGRESS NOTES
Oral Chemotherapy Monitoring Program.    Patient currently on revlimid therapy.    Reviewed labs from 3/5/18. Patient has been holding Revlimid/Dexamethasone for about 10 days now. Today (3/5/18) will be her D1. She has enough dex and revlimid to get her through the first week.  Revlimid: #9 remain (prior to starting D1 today)  Dexamethasone: #10 remain (prior to starting D1 today)    No concerning abnormalities. Quiana is still feeling somewhat fatigued and weak. Not how she felt a couple of cycles ago. But she is willing to proceed with the Revlimid and Dexamethasone at this time. Pharmacy will work on getting the remainder of each of those medications for the patient to have for the cycle.    Questions answered to patient's satisfaction.    Will follow up in 3 weeks.    Debra Matos PharmD  March 5, 2018

## 2018-03-05 NOTE — MR AVS SNAPSHOT
After Visit Summary   3/5/2018    Quiana Dunaway    MRN: [id number]           Patient Information     Date Of Birth          1935        Visit Information        Provider Department      3/5/2018 1:30 PM SH INFUSION CHAIR 15 [facility]         Follow-ups after your visit        Your next 10 appointments already scheduled     Mar 12, 2018  2:00 PM CDT   Level 2 with SH INFUSION CHAIR 9   [facility] ([facility])    [facility]  [address]  [place] [address]   [phone]            Mar 19, 2018  2:00 PM CDT   Level 2 with SH INFUSION CHAIR 5   [facility] ([facility])    [facility] [place]  [address]  [place] [address]   [phone]            Mar 26, 2018  2:00 PM CDT   Level 2 with SH INFUSION CHAIR 9   [facility] ([facility])    [facility] [place]  [address]  [place] [address]   [phone]            Apr 09, 2018  1:00 PM CDT   Level 2 with SH INFUSION CHAIR 20   [facility] ([facility])    [facility] [place]  [address]  [place] [address]   [phone]            Apr 09, 2018  1:30 PM CDT   Return Visit with Alex Parry MD   [facility] ([facility])    [facility] [place]  [address]  [place] [address]   [phone]            Apr 09, 2018  2:30 PM CDT   Remote PPM Check with RIOS TECH1   [facility]   [place] ([facility])    [address]  [place] [address] [phone]           This appointment is for a remote check of your pacemaker.  This is not an appointment at the office.            Apr 16, 2018  2:00 PM CDT    Level 2 with SH INFUSION CHAIR 10   [facility] ([facility])    [facility] [place]  [address]  [place] [address]   [phone]            Apr 17, 2018  2:15 PM CDT   [facility] EP RETURN with Alberto Worthington MD   [facility] ([facility])    [address]  [place] [address]   [phone]            Apr 23, 2018  2:00 PM CDT   Level 2 with SH INFUSION CHAIR 12   [facility] ([facility])    [facility] [place]  [address]  [place] [address]   [phone]            Apr 30, 2018  2:00 PM CDT   Level 2 with SH INFUSION CHAIR 10   [facility] ([facility])    [facility]  [address]  [place] [address] [phone]              Who to contact     If you have questions or need follow up information about today's clinic visit or your schedule please contact [facility] directly at [phone].  Normal or non-critical lab and imaging results will be communicated to you by FAMOCOhart, letter or phone within 4 business days after the clinic has received the results. If you do not hear from us within 7 days, please contact the clinic through MyChart or phone. If you have a critical or abnormal lab result, we will notify you by phone as soon as possible.  Submit refill requests through [employer] or call your pharmacy and they will forward the refill request to us. Please allow 3 business days for your refill to be completed.          Additional Information About Your Visit        [employer] Information     [employer] gives you secure access to your electronic health record. If you see a primary care provider, you can also send messages to your care team and make appointments. If you have questions,  please call your primary care clinic.  If you do not have a primary care provider, please call [phone] and they will assist you.        Care EveryWhere ID     This is your Care EveryWhere ID. This could be used by other organizations to access your [place] medical records  [id number]         Blood Pressure from Last 3 Encounters:   03/05/18 112/73   02/19/18 134/69   02/19/18 130/84    Weight from Last 3 Encounters:   03/05/18 72.4 kg (159 lb 9.6 oz)   02/19/18 72.6 kg (160 lb)   02/19/18 73.9 kg (163 lb)              Today, you had the following     No orders found for display       Primary Care Provider Office Phone # Fax #    César Chung [phone]       [facility] [place] [address]  [place] [address]        Equal Access to Services     [facility] : Hadii aad ku hadasho Soomaali, waaxda luqadaha, qaybta kaalmada adeegyada, waxay idiin hayaan carlos rodriguez . So [facility] [phone].    ATENCIÓN: Si habla español, tiene a contreras disposición servicios gratuitos de asistencia lingüística. Mitullonny al [phone].    We comply with applicable federal civil rights laws and [place] laws. We do not discriminate on the basis of race, color, national origin, age, disability, sex, sexual orientation, or gender identity.            Thank you!     Thank you for choosing [facility] AND [facility]  for your care. Our goal is always to provide you with excellent care. Hearing back from our patients is one way we can continue to improve our services. Please take a few minutes to complete the written survey that you may receive in the mail after your visit with us. Thank you!             Your Updated Medication List - Protect others around you: Learn how to safely use, store and throw away your medicines at www.disposemymeds.org.          This list is accurate as of 3/5/18  1:32 PM.  Always use your most recent med list.                   Brand Name Dispense  Instructions for use Diagnosis    * ACYCLOVIR PO      Take 400 mg by mouth 2 times daily        * acyclovir 400 MG tablet    ZOVIRAX    60 tablet    Take 1 tablet (400 mg) by mouth 2 times daily Viral Prophylaxis.    Multiple myeloma not having achieved remission (H)       aspirin 81 MG chewable tablet      Take 81 mg by mouth daily        calcium carbonate-vitamin D 600-400 MG-UNIT Chew     180 tablet    Take 1 chew tab by mouth 2 times daily    Multiple myeloma not having achieved remission (H)       dexamethasone 4 MG tablet    DECADRON    30 tablet    Take 10 tablets (40 mg) by mouth every 7 days Days 1, 8, and 15.    Multiple myeloma not having achieved remission (H)       escitalopram 5 MG tablet    LEXAPRO    90 tablet    TAKE 1 TABLET BY MOUTH DAILY    ROBER (generalized anxiety disorder)       IMODIUM A-D PO      Take by mouth as needed        LENalidomide 10 MG Caps capsule CHEMO    REVLIMID    14 capsule    Take 1 capsule (10 mg) by mouth daily for 14 days Days 1 through 14. Then off for 7 days.    Multiple myeloma not having achieved remission (H)       lisinopril 10 MG tablet    PRINIVIL/ZESTRIL    90 tablet    Take 1 tablet (10 mg) by mouth daily    Benign essential hypertension       LORazepam 0.5 MG tablet    ATIVAN    10 tablet    Take 1 tablet (0.5 mg) by mouth every 8 hours as needed (Anxiety, Nausea/Vomiting or Sleep)    Multiple myeloma not having achieved remission (H)       nitroGLYcerin 0.4 MG sublingual tablet    NITROSTAT    25 tablet    For chest pain place 1 tablet under the tongue every 5 minutes for 3 doses. If symptoms persist 5 minutes after 1st dose call 911.    Atypical chest pain       * order for DME     1 Units    Dispense one 4 wheeled walker with hand brakes and seat    Multiple myeloma not having achieved remission (H)       * order for DME     1 Units    Wheelchair.    Need for assistance due to unsteady gait       prochlorperazine 10 MG tablet    COMPAZINE    30 tablet    Take  1 tablet (10 mg) by mouth every 6 hours as needed (Nausea/Vomiting)    Multiple myeloma not having achieved remission (H)       tolterodine 4 MG 24 hr capsule    DETROL LA    90 capsule    Take 1 capsule (4 mg) by mouth daily    Urinary frequency       * Notice:  This list has 4 medication(s) that are the same as other medications prescribed for you. Read the directions carefully, and ask your doctor or other care provider to review them with you.

## 2018-03-05 NOTE — LETTER
"    3/5/2018         RE: Quiana Dunaway  [address] [facility] [address]        Dear Colleague,    Thank you for referring your patient, Quiana Dunaway, to the [facility]. Please see a copy of my visit note below.    Oncology Rooming Note    March 5, 2018 1:09 PM   Quiana Dunaway is a 82 year old female who presents for:    Chief Complaint   Patient presents with     Oncology Clinic Visit     Initial Vitals: /73 (BP Location: Left arm, Patient Position: Chair, Cuff Size: Adult Regular)  Pulse 79  Temp 98.5  F (36.9  C) (Oral)  Resp 16  Wt 72.4 kg (159 lb 9.6 oz)  SpO2 93%  BMI 29.19 kg/m2 Estimated body mass index is 29.19 kg/(m^2) as calculated from the following:    Height as of 2/19/18: 1.575 m (5' 2\").    Weight as of this encounter: 72.4 kg (159 lb 9.6 oz). Body surface area is 1.78 meters squared.  No Pain (0) Comment: Data Unavailable   No LMP recorded. Patient is postmenopausal.  Allergies reviewed: Yes  Medications reviewed: Yes    Medications: Medication refills not needed today.  Pharmacy name entered into [facility]:    [facility] - [place], [place] - [address] [facility] [address]  [facility] [place], [place] - [address]  [facility] - [place], [place] - [address]    Clinical concerns: None     5 minutes for nursing intake (face to face time)     Bonny Sharif CMA                Again, thank you for allowing me to participate in the care of your patient.        Sincerely,        Alex Parry MD    "

## 2018-03-05 NOTE — PROGRESS NOTES
"Oncology Rooming Note    March 5, 2018 1:09 PM   Quiana Dunaway is a 82 year old female who presents for:    Chief Complaint   Patient presents with     Oncology Clinic Visit     Initial Vitals: /73 (BP Location: Left arm, Patient Position: Chair, Cuff Size: Adult Regular)  Pulse 79  Temp 98.5  F (36.9  C) (Oral)  Resp 16  Wt 72.4 kg (159 lb 9.6 oz)  SpO2 93%  BMI 29.19 kg/m2 Estimated body mass index is 29.19 kg/(m^2) as calculated from the following:    Height as of 2/19/18: 1.575 m (5' 2\").    Weight as of this encounter: 72.4 kg (159 lb 9.6 oz). Body surface area is 1.78 meters squared.  No Pain (0) Comment: Data Unavailable   No LMP recorded. Patient is postmenopausal.  Allergies reviewed: Yes  Medications reviewed: Yes    Medications: Medication refills not needed today.  Pharmacy name entered into [facility]:    [id number] - [place], [place] - [address] AT [facility] [address]  [facility] [place], [place] - [address]  [facility] - [place], [place] - [address]    Clinical concerns: None     5 minutes for nursing intake (face to face time)     Bonny Sharif CMA              "

## 2018-03-05 NOTE — PROGRESS NOTES
Infusion Nursing Note:  Quiana Dunaway presents today for D1 C10 Velcade.    Patient seen by provider today: Yes:    present during visit today: Not Applicable.    Note: No concerns or changes to report. Saw  before infusion visit..    Intravenous Access:  Lab draw site right AC, Needle type butterfly, Gauge 23.  Labs drawn without difficulty.    Treatment Conditions:  Lab Results   Component Value Date    HGB 12.3 03/05/2018     Lab Results   Component Value Date    WBC 5.0 03/05/2018      Lab Results   Component Value Date    ANEU 3.0 03/05/2018     Lab Results   Component Value Date     03/05/2018      Lab Results   Component Value Date     03/05/2018                   Lab Results   Component Value Date    POTASSIUM 3.3 03/05/2018           No results found for: MAG         Lab Results   Component Value Date    CR 0.62 03/05/2018                   Lab Results   Component Value Date    HUMBERTO 8.6 03/05/2018                Lab Results   Component Value Date    BILITOTAL 0.4 03/05/2018           Lab Results   Component Value Date    ALBUMIN 3.5 03/05/2018                    Lab Results   Component Value Date    ALT 20 03/05/2018           Lab Results   Component Value Date    AST 14 03/05/2018       Results reviewed, labs MET treatment parameters, ok to proceed with treatment.  Potassium 3.3 and will give po replacement today per  ok. List of foods high in potassium given to patient.      Post Infusion Assessment:  Patient tolerated injection without incident.    Discharge Plan:   Discharge instructions reviewed with: Patient.  Patient and/or family verbalized understanding of discharge instructions and all questions answered.  Copy of AVS reviewed with patient and/or family.  Patient will return 3/12 for next appointment.  Patient discharged in stable condition accompanied by: daughter.  Departure Mode: Ambulatory.  May need refills on Decadron and Revlimid but  pharmacy liason not here and patient will check her supply. Treatment plan not completed per Debra pharmacist request until this is confirmed tomorrow. Pt. Will call.    Cortney Cruz RN

## 2018-03-05 NOTE — PATIENT INSTRUCTIONS
Continue chemotherapy.  Already Scheduled - Tiera  Follow up in 1 month.   Already Scheduled - Tiera    AVS given to patient - Tiera

## 2018-03-05 NOTE — MR AVS SNAPSHOT
After Visit Summary   3/5/2018    Quiana Dunaway    MRN: [id number]           Patient Information     Date Of Birth          1935        Visit Information        Provider Department      3/5/2018 1:00 PM Alex Parry MD [facility]        Today's Diagnoses     Multiple myeloma not having achieved remission (H)    -  1      Care Instructions    Continue chemotherapy.  Already Scheduled - Tiera  Follow up in 1 month.   Already Scheduled - Tiera    AVS given to patient - Tiera          Follow-ups after your visit        Your next 10 appointments already scheduled     Mar 19, 2018  2:00 PM CDT   Level 2 with  INFUSION CHAIR 5   [facility] ([facility])    [facility]  [address]  [place] [address]   [phone]            Mar 26, 2018 12:30 PM CDT   Office Visit with César Chung MD   [facility] ([facility])    [address] [facility]  [place] [address] [phone]           Bring a current list of meds and any records pertaining to this visit. For Physicals, please bring immunization records and any forms needing to be filled out. Please arrive 10 minutes early to complete paperwork.            Mar 26, 2018  2:00 PM CDT   Level 2 with  INFUSION CHAIR 9   [facility] ([facility])    [facility]  [address]  [place] [address]   [phone]            Apr 09, 2018  1:00 PM CDT   Level 2 with  INFUSION CHAIR 20   [facility] ([facility])    [facility]  [address]  [place] [address]   [phone]            Apr 09, 2018  1:30 PM CDT   Return Visit with Alex Parry MD   [facility] ([facility])    [facility]  [address]  [place]  [address]   [phone]            Apr 09, 2018  2:30 PM CDT   Remote PPM Check with RIOS TECH1   [facility] ([facility])    [address]  [place] [address] [phone]           This appointment is for a remote check of your pacemaker.  This is not an appointment at the office.            Apr 16, 2018  2:00 PM CDT   Level 2 with  INFUSION CHAIR 10   [facility] ([facility])    [facility]  [address]  [place] [address]   [phone]            Apr 17, 2018  2:15 PM CDT   [facility] EP RETURN with Alberto Worthington MD   [facility] ([facility])    [address]  [place] [address]   [phone]            Apr 23, 2018  2:00 PM CDT   Level 2 with  INFUSION CHAIR 12   [facility] ([facility])    [facility]  [address]  [place] [address]   [phone]            Apr 30, 2018  2:00 PM CDT   Level 2 with  INFUSION CHAIR 10   [facility] ([facility])    [facility]  [address]  [place] [address]   [phone]              Who to contact     If you have questions or need follow up information about today's clinic visit or your schedule please contact [facility] directly at [phone].  Normal or non-critical lab and imaging results will be communicated to you by MyChart, letter or phone within 4 business days after the clinic has received the results. If you do not hear from us within 7 days, please contact the clinic through MyChart or phone. If you have a critical or abnormal lab result, we will notify you by phone as soon as possible.  Submit refill requests through [employer] or call your pharmacy and they will  forward the refill request to us. Please allow 3 business days for your refill to be completed.          Additional Information About Your Visit        [employer] Information     [employer] gives you secure access to your electronic health record. If you see a primary care provider, you can also send messages to your care team and make appointments. If you have questions, please call your primary care clinic.  If you do not have a primary care provider, please call [phone] and they will assist you.        Care EveryWhere ID     This is your Care EveryWhere ID. This could be used by other organizations to access your [place] medical records  [id number]        Your Vitals Were     Pulse Temperature Respirations Pulse Oximetry BMI (Body Mass Index)       79 98.5  F (36.9  C) (Oral) 16 93% 29.19 kg/m2        Blood Pressure from Last 3 Encounters:   No data found for BP    Weight from Last 3 Encounters:   No data found for Wt              Today, you had the following     No orders found for display       Primary Care Provider Office Phone # Fax #    César Chung [phone]       [address]  [facility] [address]        Equal Access to Services     [facility]: Hadii aad ku hadasho Soomaali, waaxda luqadaha, qaybta kaalmada adeegyada, phoenix gaines haydiana rodriguez . So [facility] [phone].    ATENCIÓN: Si habla español, tiene a contreras disposición servicios gratuitos de asistencia lingüística. [facility] [phone].    We comply with applicable federal civil rights laws and [place] laws. We do not discriminate on the basis of race, color, national origin, age, disability, sex, sexual orientation, or gender identity.            Thank you!     Thank you for choosing [facility]  for your care. Our goal is always to provide you with excellent care. Hearing back from our patients is one way we can continue to improve our services. Please take a few  minutes to complete the written survey that you may receive in the mail after your visit with us. Thank you!             Your Updated Medication List - Protect others around you: Learn how to safely use, store and throw away your medicines at www.disposemymeds.org.          This list is accurate as of 3/5/18 11:59 PM.  Always use your most recent med list.                   Brand Name Dispense Instructions for use Diagnosis    * ACYCLOVIR PO      Take 400 mg by mouth 2 times daily        * acyclovir 400 MG tablet    ZOVIRAX    60 tablet    Take 1 tablet (400 mg) by mouth 2 times daily Viral Prophylaxis.    Multiple myeloma not having achieved remission (H)       aspirin 81 MG chewable tablet      Take 81 mg by mouth daily        calcium carbonate-vitamin D 600-400 MG-UNIT Chew     180 tablet    Take 1 chew tab by mouth 2 times daily    Multiple myeloma not having achieved remission (H)       dexamethasone 4 MG tablet    DECADRON    30 tablet    Take 10 tablets (40 mg) by mouth every 7 days Days 1, 8, and 15.    Multiple myeloma not having achieved remission (H)       escitalopram 5 MG tablet    LEXAPRO    90 tablet    TAKE 1 TABLET BY MOUTH DAILY    ROBER (generalized anxiety disorder)       IMODIUM A-D PO      Take by mouth as needed        LENalidomide 10 MG Caps capsule CHEMO    REVLIMID    14 capsule    Take 1 capsule (10 mg) by mouth daily for 14 days Days 1 through 14. Then off for 7 days.    Multiple myeloma not having achieved remission (H)       lisinopril 10 MG tablet    PRINIVIL/ZESTRIL    90 tablet    Take 1 tablet (10 mg) by mouth daily    Benign essential hypertension       LORazepam 0.5 MG tablet    ATIVAN    10 tablet    Take 1 tablet (0.5 mg) by mouth every 8 hours as needed (Anxiety, Nausea/Vomiting or Sleep)    Multiple myeloma not having achieved remission (H)       nitroGLYcerin 0.4 MG sublingual tablet    NITROSTAT    25 tablet    For chest pain place 1 tablet under the tongue every 5 minutes for  3 doses. If symptoms persist 5 minutes after 1st dose call 911.    Atypical chest pain       * order for DME     1 Units    Dispense one 4 wheeled walker with hand brakes and seat    Multiple myeloma not having achieved remission (H)       * order for DME     1 Units    Wheelchair.    Need for assistance due to unsteady gait       prochlorperazine 10 MG tablet    COMPAZINE    30 tablet    Take 1 tablet (10 mg) by mouth every 6 hours as needed (Nausea/Vomiting)    Multiple myeloma not having achieved remission (H)       tolterodine 4 MG 24 hr capsule    DETROL LA    90 capsule    Take 1 capsule (4 mg) by mouth daily    Urinary frequency       * Notice:  This list has 4 medication(s) that are the same as other medications prescribed for you. Read the directions carefully, and ask your doctor or other care provider to review them with you.

## 2018-03-06 LAB
ALBUMIN SERPL ELPH-MCNC: 3.9 G/DL (ref 3.7–5.1)
ALPHA1 GLOB SERPL ELPH-MCNC: 0.4 G/DL (ref 0.2–0.4)
ALPHA2 GLOB SERPL ELPH-MCNC: 0.8 G/DL (ref 0.5–0.9)
B-GLOBULIN SERPL ELPH-MCNC: 0.7 G/DL (ref 0.6–1)
GAMMA GLOB SERPL ELPH-MCNC: 0.5 G/DL (ref 0.7–1.6)
IGG SERPL-MCNC: 519 MG/DL (ref 695–1620)
KAPPA LC UR-MCNC: 3.14 MG/DL (ref 0.33–1.94)
KAPPA LC/LAMBDA SER: 4.98 {RATIO} (ref 0.26–1.65)
LAMBDA LC SERPL-MCNC: 0.63 MG/DL (ref 0.57–2.63)
M PROTEIN SERPL ELPH-MCNC: 0.3 G/DL
PROT PATTERN SERPL ELPH-IMP: ABNORMAL

## 2018-03-07 DIAGNOSIS — C90.00 MULTIPLE MYELOMA NOT HAVING ACHIEVED REMISSION (H): Primary | ICD-10-CM

## 2018-03-08 RX ORDER — DEXAMETHASONE 4 MG/1
40 TABLET ORAL
Qty: 20 TABLET | Refills: 0 | Status: SHIPPED | OUTPATIENT
Start: 2018-03-08 | End: 2018-03-23

## 2018-03-08 RX ORDER — LENALIDOMIDE 10 MG/1
10 CAPSULE ORAL DAILY
Qty: 5 CAPSULE | Refills: 0 | Status: SHIPPED | OUTPATIENT
Start: 2018-03-08 | End: 2018-12-26

## 2018-03-09 NOTE — PROGRESS NOTES
Visit Date:   03/05/2018     HEMATOLOGY HISTORY: Mrs. Dunaway is a lady with multiple myeloma (IgG kappa). High risk, t(14;16).  1. She was evaluated for cytopenia. Multiple labs done on 05/01/2017:  -WBC of 3.4, hemoglobin of 10.2 and platelet of 154.  -CMP, Vitamin B12, folate, iron, ferritin, TSH aand haptoglobin are all normal.  2. SPEP on 07/07/2017 revealed M-spike of 1.8.  3. Bone marrow biopsy on 07/12/2017 reveals hypercellular bone marrow with kappa monotypic plasma cell population consistent with multiple myeloma.  Bone marrow is 70% cellular.  Plasma cell is 50-60%.     -There is gain of chromosome 1, 5, 9, 15, and 17.  There is translocation 14;16.   4.  On 07/18/2017:  -M-spike of 1.9.   -Immunofixation reveals monoclonal IgG kappa.    -IgG level of 2970.  IgA of 15 and IgM of 175.    -Kappa free light chain of 67.7.  Lambda free light chain of 1.42.  Ratio of kappa to lambda of 47.71.    -Beta 2 microglobulin of 3.4.   5. PET scan on 07/24/2017 reveals several focal areas of increased FDG uptake consistent with multiple myeloma.  There is probable epithelial cyst in tail of the pancreas.  No associated abnormal FDG activity.  Left thyroid cysts or nodules without any FDG activity.  There is a 0.3 cm left upper lobe lung nodule.   8.  Velcade, Revlimid and dexamethasone started on 08/14/2017.  9. On 10/02/2017 (after 2 cycles):  -M-spike of 0.5  -Kappa free light chain of 4.3  -IgG of 717.      SUBJECTIVE:    Ms. Dunaway is an 82-year-old female with IgG kappa multiple myeloma as described above.  She is on treatment with Velcade, Revlimid and dexamethasone.  Her disease is responding.      Lately the patient has been having more fatigue.  Because of fatigue, she has not taken Revlimid since 02/15/2018.  Her fatigue has improved since she stopped the Revlimid. Patient otherwise is doing well.      REVIEW OF SYSTEMS:    No headache.  No dizziness.  No chest pain or difficulty breathing.  No abdominal  pain, nausea or vomiting.  Appetite has been fairly good.  No fever or chills.  No urinary or bowel complaints.  No bleeding.      PHYSICAL EXAMINATION:    Alert and oriented × 3.  Eyes: No icterus.    Throat: No ulcer.    Neck: No lymphadenopathy.  Nontender.  Axilla: No lymphadenopathy.  Lungs: Good air entry bilaterally.  Occasional wheezing.  Heart: Regular.  Abdomen: Soft.  Nondistended.  Nontender.  No masses felt.  Extremities: Mild edema.  Skin: No petechiae.      LABORATORY DATA:  Reviewed.      ASSESSMENT:   1.  An 82-year-old female with high risk IGG kappa multiple myeloma.   2.  Fatigue secondary to myeloma, age and Revlimid.     3.  Mild hypokalemia.      PLAN:   1.  Discussed regarding myeloma.  Patient has been on Velcade, Revlimid and dexamethasone.  Her disease has been responding.      Patient has been having side effect of fatigue.  Her fatigue improved when Revlimid was put on hold.        I explained to the patient that we have few different options.  One would be to stop the Revlimid and just continue on Velcade and dexamethasone.  Patient does not want to do that.  She is worried about worsening of disease.  Other option would be to continue the Revlimid and take time off from Revlimid when there is a significant worsening of fatigue.  Patient wants to do that.  We also discussed regarding reducing the dose of Revlimid.    After discussion, the patient at this time wants to go back on Revlimid at the same dose of 10 mg a day.  I will see her in a month.  Depending on the fatigue, will decide regarding further revlimid.       2.  Patient had a few questions, which were all answered.  Advised her to call us if she has worsening fatigue, chest pain, shortness of breath, infection, bleeding or any other concerns.      Total time spent 25 minutes, more than 50% of time spent in counseling and coordination of care.         YVES PEREZ MD             D: 03/08/2018   T: 03/09/2018   MT: GAEL       Name:     DUGLAS CASTILLO   MRN:      [id number]        Account:      [id number]   :      1935           Visit Date:   2018      Document: [id number]

## 2018-03-12 ENCOUNTER — INFUSION THERAPY VISIT (OUTPATIENT)
Dept: INFUSION THERAPY | Facility: CLINIC | Age: 83
End: 2018-03-12
Attending: INTERNAL MEDICINE
Payer: MEDICARE

## 2018-03-12 ENCOUNTER — HOSPITAL ENCOUNTER (OUTPATIENT)
Facility: CLINIC | Age: 83
Setting detail: SPECIMEN
Discharge: HOME | End: 2018-03-12
Attending: INTERNAL MEDICINE | Admitting: INTERNAL MEDICINE
Payer: MEDICARE

## 2018-03-12 VITALS
WEIGHT: 159.2 LBS | OXYGEN SATURATION: 95 % | DIASTOLIC BLOOD PRESSURE: 56 MMHG | BODY MASS INDEX: 29.12 KG/M2 | RESPIRATION RATE: 20 BRPM | HEART RATE: 88 BPM | SYSTOLIC BLOOD PRESSURE: 108 MMHG | TEMPERATURE: 97.6 F

## 2018-03-12 DIAGNOSIS — C90.00 MULTIPLE MYELOMA NOT HAVING ACHIEVED REMISSION (H): Primary | ICD-10-CM

## 2018-03-12 LAB
ALBUMIN SERPL-MCNC: 3.4 G/DL (ref 3.4–5)
BASOPHILS # BLD AUTO: 0 10E9/L (ref 0–0.2)
BASOPHILS NFR BLD AUTO: 0.4 %
CALCIUM SERPL-MCNC: 8.2 MG/DL (ref 8.5–10.1)
CREAT SERPL-MCNC: 0.64 MG/DL (ref 0.52–1.04)
DIFFERENTIAL METHOD BLD: ABNORMAL
EOSINOPHIL # BLD AUTO: 0.4 10E9/L (ref 0–0.7)
EOSINOPHIL NFR BLD AUTO: 6.8 %
ERYTHROCYTE [DISTWIDTH] IN BLOOD BY AUTOMATED COUNT: 15.5 % (ref 10–15)
GFR SERPL CREATININE-BSD FRML MDRD: 90 ML/MIN/1.7M2
HCT VFR BLD AUTO: 36.1 % (ref 35–47)
HGB BLD-MCNC: 12 G/DL (ref 11.7–15.7)
IMM GRANULOCYTES # BLD: 0.1 10E9/L (ref 0–0.4)
IMM GRANULOCYTES NFR BLD: 0.9 %
LYMPHOCYTES # BLD AUTO: 0.9 10E9/L (ref 0.8–5.3)
LYMPHOCYTES NFR BLD AUTO: 17 %
MCH RBC QN AUTO: 29.3 PG (ref 26.5–33)
MCHC RBC AUTO-ENTMCNC: 33.2 G/DL (ref 31.5–36.5)
MCV RBC AUTO: 88 FL (ref 78–100)
MONOCYTES # BLD AUTO: 0.7 10E9/L (ref 0–1.3)
MONOCYTES NFR BLD AUTO: 12 %
NEUTROPHILS # BLD AUTO: 3.5 10E9/L (ref 1.6–8.3)
NEUTROPHILS NFR BLD AUTO: 62.9 %
NRBC # BLD AUTO: 0 10*3/UL
NRBC BLD AUTO-RTO: 0 /100
PLATELET # BLD AUTO: 199 10E9/L (ref 150–450)
RBC # BLD AUTO: 4.1 10E12/L (ref 3.8–5.2)
WBC # BLD AUTO: 5.5 10E9/L (ref 4–11)

## 2018-03-12 PROCEDURE — 82310 ASSAY OF CALCIUM: CPT | Performed by: INTERNAL MEDICINE

## 2018-03-12 PROCEDURE — 96374 THER/PROPH/DIAG INJ IV PUSH: CPT

## 2018-03-12 PROCEDURE — 82040 ASSAY OF SERUM ALBUMIN: CPT | Performed by: INTERNAL MEDICINE

## 2018-03-12 PROCEDURE — 85025 COMPLETE CBC W/AUTO DIFF WBC: CPT | Performed by: INTERNAL MEDICINE

## 2018-03-12 PROCEDURE — 96401 CHEMO ANTI-NEOPL SQ/IM: CPT

## 2018-03-12 PROCEDURE — 82565 ASSAY OF CREATININE: CPT | Performed by: INTERNAL MEDICINE

## 2018-03-12 PROCEDURE — 96375 TX/PRO/DX INJ NEW DRUG ADDON: CPT

## 2018-03-12 PROCEDURE — 25000128 H RX IP 250 OP 636: Performed by: INTERNAL MEDICINE

## 2018-03-12 RX ORDER — ZOLEDRONIC ACID 0.04 MG/ML
4 INJECTION, SOLUTION INTRAVENOUS ONCE
Status: COMPLETED | OUTPATIENT
Start: 2018-03-12 | End: 2018-03-12

## 2018-03-12 RX ADMIN — BORTEZOMIB 2.7 MG: 3.5 INJECTION, POWDER, LYOPHILIZED, FOR SOLUTION INTRAVENOUS; SUBCUTANEOUS at 14:50

## 2018-03-12 RX ADMIN — ZOLEDRONIC ACID 4 MG: 0.04 INJECTION, SOLUTION INTRAVENOUS at 14:47

## 2018-03-12 RX ADMIN — SODIUM CHLORIDE 250 ML: 9 INJECTION, SOLUTION INTRAVENOUS at 14:47

## 2018-03-12 ASSESSMENT — PAIN SCALES - GENERAL: PAINLEVEL: NO PAIN (0)

## 2018-03-12 NOTE — PROGRESS NOTES
Infusion Nursing Note:  Quiana Dunaway presents today for Cycle 10 Day 8 Velcade and Zometa.    Patient seen by provider today: No   present during visit today: Not Applicable.    Note: Pt reports having itching all over intermittently. Per Curt Mckeon recommendations, pt should start taking Benadryl PO or Hydrocortisone cream over the counter. Pt reports having possible hernia for the last 6 months and feels like it could be getting bigger. Pt denies any pain, discomfort or other unusual symptoms. Pt encouraged to follow up with primary Dr about this matter. Patient and her daughter in law were going to go to Primary Doctors office today after this appointment. Pt instructed to go to the ER if she experiences any pain or notices her belly getting bigger.     Intravenous Access:  Peripheral IV placed.    Treatment Conditions:  Lab Results   Component Value Date    HGB 12.0 03/12/2018     Lab Results   Component Value Date    WBC 5.5 03/12/2018      Lab Results   Component Value Date    ANEU 3.5 03/12/2018     Lab Results   Component Value Date     03/12/2018      Lab Results   Component Value Date     03/05/2018                   Lab Results   Component Value Date    POTASSIUM 3.3 03/05/2018           No results found for: MAG         Lab Results   Component Value Date    CR 0.64 03/12/2018                   Lab Results   Component Value Date    HUMBERTO 8.2 03/12/2018                Lab Results   Component Value Date    BILITOTAL 0.4 03/05/2018           Lab Results   Component Value Date    ALBUMIN 3.4 03/12/2018                    Lab Results   Component Value Date    ALT 20 03/05/2018           Lab Results   Component Value Date    AST 14 03/05/2018       Results reviewed, labs MET treatment parameters, ok to proceed with treatment.      Post Infusion Assessment:  Patient tolerated infusion without incident.  Patient tolerated injection without incident.  Blood return noted pre and post  infusion.  Site patent and intact, free from redness, edema or discomfort.  No evidence of extravasations.  Access discontinued per protocol.    Discharge Plan:   Patient declined prescription refills.  Discharge instructions reviewed with: Patient and family.  Patient and family verbalized understanding of discharge instructions and all questions answered.  AVS to patient via [employer].  Patient will return 3/19/18 for next appointment.   Patient discharged in stable condition accompanied by: self and daughter-in-law.  Departure Mode: Ambulatory.    Isabel Bethea RN

## 2018-03-12 NOTE — MR AVS SNAPSHOT
After Visit Summary   3/12/2018    Quiana Dunaway    MRN: [id number]           Patient Information     Date Of Birth          1935        Visit Information        Provider Department      3/12/2018 2:00 PM SH INFUSION CHAIR 9 [facility]        Today's Diagnoses     Multiple myeloma not having achieved remission (H)    -  1       Follow-ups after your visit        Your next 10 appointments already scheduled     Mar 19, 2018  2:00 PM CDT   Level 2 with SH INFUSION CHAIR 5   [facility] ([facility])    [place] [id number]  [place] [address]   [phone]            Mar 26, 2018  2:00 PM CDT   Level 2 with  INFUSION CHAIR 9   [facility] ([facility])    [facility]  [address]  [place] [address]   [phone]            Apr 09, 2018  1:00 PM CDT   Level 2 with  INFUSION CHAIR 20   [facility] ([facility])    [facility]  [address]  [place] [address]   [phone]            Apr 09, 2018  1:30 PM CDT   Return Visit with Alex Parry MD   [facility] ([facility])    [facility]  [address]  [place] [address]   [phone]            Apr 09, 2018  2:30 PM CDT   Remote PPM Check with RIOS TECH1   [facility]   [place] ([facility])    [address]  [place] [address]   [phone]           This appointment is for a remote check of your pacemaker.  This is not an appointment at the office.            Apr 16, 2018  2:00 PM CDT   Level 2 with  INFUSION CHAIR 10   [facility] ([facility])    [facility]  [address]  [place] [address]   [phone]            Apr 17, 2018  2:15 PM CDT   P EP RETURN with Alberto Worthington MD   [facility] ([facility])    [address]  [place] [address] [phone]            Apr 23, 2018  2:00 PM CDT   Level 2 with SH INFUSION CHAIR 12   [facility] [place] [facility] ([facility])    [facility]  [address]  [place] [address]   [phone]            Apr 30, 2018  2:00 PM CDT   Level 2 with SH INFUSION CHAIR 10   [facility] [place] [facility] ([facility])    [facility]  [address]  [place] [address]   [phone]            May 14, 2018  1:00 PM CDT   Return Visit with Leona Rodriguez PA-C   [facility] [place] ([facility] [place])    [address]  [address]  [place] [address] [phone]              Who to contact     If you have questions or need follow up information about today's clinic visit or your schedule please contact [facility] directly at [phone].  Normal or non-critical lab and imaging results will be communicated to you by [employer], letter or phone within 4 business days after the clinic has received the results. If you do not hear from us within 7 days, please contact the clinic through [employer] or phone. If you have a critical or abnormal lab result, we will notify you by phone as soon as possible.  Submit refill requests through [email or web address] or call your pharmacy and they will forward the refill request to us. Please allow 3 business days for your refill to be completed.          Additional Information About Your Visit        [facility] Information     [email or web address] gives you secure access to your electronic health record. If you see a primary care provider, you can also send messages to  your care team and make appointments. If you have questions, please call your primary care clinic.  If you do not have a primary care provider, please call [phone] and they will assist you.        Care EveryWhere ID     This is your Care EveryWhere ID. This could be used by other organizations to access your [facility] medical records  [id number]        Your Vitals Were     Pulse Temperature Respirations Pulse Oximetry BMI (Body Mass Index)       88 97.6  F (36.4  C) (Oral) 20 95% 29.12 kg/m2        Blood Pressure from Last 3 Encounters:   03/12/18 108/56   03/05/18 121/61   03/05/18 112/73    Weight from Last 3 Encounters:   03/12/18 72.2 kg (159 lb 3.2 oz)   03/05/18 72.4 kg (159 lb 9.8 oz)   03/05/18 72.4 kg (159 lb 9.6 oz)              We Performed the Following     Albumin level     Calcium     CBC with platelets differential     Creatinine        Primary Care Provider Office Phone # Fax #    César GISELE Chung [phone]       [facility] [address]  [place] [address]        Equal Access to Services     [facility] : Hadii aad ku hadasho Soomaali, waaxda luqadaha, qaybta kaalmada adeegyada, phoenix hortonn carlos rodriguez . So [facility] [phone].    ATENCIÓN: Si habla español, tiene a contreras disposición servicios gratuitos de asistencia lingüística. Mitulame al [phone].    We comply with applicable federal civil rights laws and [place] laws. We do not discriminate on the basis of race, color, national origin, age, disability, sex, sexual orientation, or gender identity.            Thank you!     Thank you for choosing [facility]  for your care. Our goal is always to provide you with excellent care. Hearing back from our patients is one way we can continue to improve our services. Please take a few minutes to complete the written survey that you may receive in the mail after your visit with us. Thank you!             Your  Updated Medication List - Protect others around you: Learn how to safely use, store and throw away your medicines at www.disposemymeds.org.          This list is accurate as of 3/12/18  3:20 PM.  Always use your most recent med list.                   Brand Name Dispense Instructions for use Diagnosis    * ACYCLOVIR PO      Take 400 mg by mouth 2 times daily        * acyclovir 400 MG tablet    ZOVIRAX    60 tablet    Take 1 tablet (400 mg) by mouth 2 times daily Viral Prophylaxis.    Multiple myeloma not having achieved remission (H)       aspirin 81 MG chewable tablet      Take 81 mg by mouth daily        calcium carbonate-vitamin D 600-400 MG-UNIT Chew     180 tablet    Take 1 chew tab by mouth 2 times daily    Multiple myeloma not having achieved remission (H)       * dexamethasone 4 MG tablet    DECADRON    30 tablet    Take 10 tablets (40 mg) by mouth every 7 days Days 1, 8, and 15.    Multiple myeloma not having achieved remission (H)       * dexamethasone 4 MG tablet    DECADRON    20 tablet    Take 10 tablets (40 mg) by mouth every 7 days for 3 doses Days 1, 8, and 15.    Multiple myeloma not having achieved remission (H)       escitalopram 5 MG tablet    LEXAPRO    90 tablet    TAKE 1 TABLET BY MOUTH DAILY    ROBER (generalized anxiety disorder)       IMODIUM A-D PO      Take by mouth as needed        LENalidomide 10 MG Caps capsule CHEMO    REVLIMID    5 capsule    Take 1 capsule (10 mg) by mouth daily for 14 days Days 1 through 14.    Multiple myeloma not having achieved remission (H)       lisinopril 10 MG tablet    PRINIVIL/ZESTRIL    90 tablet    Take 1 tablet (10 mg) by mouth daily    Benign essential hypertension       LORazepam 0.5 MG tablet    ATIVAN    10 tablet    Take 1 tablet (0.5 mg) by mouth every 8 hours as needed (Anxiety, Nausea/Vomiting or Sleep)    Multiple myeloma not having achieved remission (H)       nitroGLYcerin 0.4 MG sublingual tablet    NITROSTAT    25 tablet    For chest pain  place 1 tablet under the tongue every 5 minutes for 3 doses. If symptoms persist 5 minutes after 1st dose call 911.    Atypical chest pain       * order for DME     1 Units    Dispense one 4 wheeled walker with hand brakes and seat    Multiple myeloma not having achieved remission (H)       * order for DME     1 Units    Wheelchair.    Need for assistance due to unsteady gait       prochlorperazine 10 MG tablet    COMPAZINE    30 tablet    Take 1 tablet (10 mg) by mouth every 6 hours as needed (Nausea/Vomiting)    Multiple myeloma not having achieved remission (H)       tolterodine 4 MG 24 hr capsule    DETROL LA    90 capsule    Take 1 capsule (4 mg) by mouth daily    Urinary frequency       * Notice:  This list has 6 medication(s) that are the same as other medications prescribed for you. Read the directions carefully, and ask your doctor or other care provider to review them with you.

## 2018-03-13 ENCOUNTER — TELEPHONE (OUTPATIENT)
Dept: FAMILY MEDICINE | Facility: CLINIC | Age: 83
End: 2018-03-13

## 2018-03-13 DIAGNOSIS — K86.2 CYST OF PANCREAS: Primary | ICD-10-CM

## 2018-03-13 NOTE — TELEPHONE ENCOUNTER
TO PCP:     Called [facility]  Only the [facility] ([facility]) does MRIs for patients with pacemakers  When pt calls to schedule, she will need to provide information about the make and model number - Their number is: [phone]    Spoke with patient's daughter Jackie (on C2C)  Gave her the information to schedule MRI at the [facility], but she states she is requesting to wait to discuss this at pt's upcoming 3/26/18 appointment with PCP    Grace LIGHT RN

## 2018-03-13 NOTE — TELEPHONE ENCOUNTER
Chart CC'd to Triage:       César Chung MD - [facility] Encounter Summary       Diagnosis       Cyst Of Pancreas (Primary)              Orders Signed This Encounter (1)      MR Abdomen MRCP w/o & w Contrast               Orders Pended This Encounter        None                Progress notes          César Chung MD at 3/13/2018 10:25 AM        Status: Sign at close encounter            Help patient get scheduled for MRI pancreas  to follow up pancreatic findings noted in 3/17 CT chest     Also please call radiology to see if they can do the study if she has pace maker.  She has a pacemaker.  If they cannot do the study, then she will need a GI consult with Dr. Cazares to see if an EUS would be helpful

## 2018-03-13 NOTE — PROGRESS NOTES
Help patient get scheduled for MRI pancreas  to follow up pancreatic findings noted in 3/17 CT chest    Also please call radiology to see if they can do the study if she has pace maker.  She has a pacemaker.  If they cannot do the study, then she will need a GI consult with Dr. Cazares to see if an EUS would be helpful

## 2018-03-13 NOTE — TELEPHONE ENCOUNTER
Spoke with patient's daughter:     Updated that it OK to wait to schedule MRI until after appointment.     Agrees with plan.     Aliza THOMAS RN

## 2018-03-19 ENCOUNTER — HOSPITAL ENCOUNTER (OUTPATIENT)
Facility: CLINIC | Age: 83
Setting detail: SPECIMEN
Discharge: HOME | End: 2018-03-19
Attending: INTERNAL MEDICINE | Admitting: INTERNAL MEDICINE
Payer: MEDICARE

## 2018-03-19 ENCOUNTER — INFUSION THERAPY VISIT (OUTPATIENT)
Dept: INFUSION THERAPY | Facility: CLINIC | Age: 83
End: 2018-03-19
Attending: INTERNAL MEDICINE
Payer: MEDICARE

## 2018-03-19 VITALS
TEMPERATURE: 98.3 F | HEART RATE: 84 BPM | WEIGHT: 160.6 LBS | HEIGHT: 62 IN | SYSTOLIC BLOOD PRESSURE: 146 MMHG | RESPIRATION RATE: 20 BRPM | OXYGEN SATURATION: 95 % | DIASTOLIC BLOOD PRESSURE: 75 MMHG | BODY MASS INDEX: 29.55 KG/M2

## 2018-03-19 DIAGNOSIS — C90.00 MULTIPLE MYELOMA NOT HAVING ACHIEVED REMISSION (H): Primary | ICD-10-CM

## 2018-03-19 LAB
BASOPHILS # BLD AUTO: 0 10E9/L (ref 0–0.2)
BASOPHILS NFR BLD AUTO: 0.4 %
DIFFERENTIAL METHOD BLD: ABNORMAL
EOSINOPHIL # BLD AUTO: 0.3 10E9/L (ref 0–0.7)
EOSINOPHIL NFR BLD AUTO: 6.9 %
ERYTHROCYTE [DISTWIDTH] IN BLOOD BY AUTOMATED COUNT: 14.9 % (ref 10–15)
HCT VFR BLD AUTO: 34.9 % (ref 35–47)
HGB BLD-MCNC: 11.8 G/DL (ref 11.7–15.7)
IMM GRANULOCYTES # BLD: 0 10E9/L (ref 0–0.4)
IMM GRANULOCYTES NFR BLD: 0.2 %
LYMPHOCYTES # BLD AUTO: 0.9 10E9/L (ref 0.8–5.3)
LYMPHOCYTES NFR BLD AUTO: 18.6 %
MCH RBC QN AUTO: 29.4 PG (ref 26.5–33)
MCHC RBC AUTO-ENTMCNC: 33.8 G/DL (ref 31.5–36.5)
MCV RBC AUTO: 87 FL (ref 78–100)
MONOCYTES # BLD AUTO: 1.1 10E9/L (ref 0–1.3)
MONOCYTES NFR BLD AUTO: 23.5 %
NEUTROPHILS # BLD AUTO: 2.3 10E9/L (ref 1.6–8.3)
NEUTROPHILS NFR BLD AUTO: 50.4 %
NRBC # BLD AUTO: 0 10*3/UL
NRBC BLD AUTO-RTO: 0 /100
OVALOCYTES BLD QL SMEAR: SLIGHT
PLATELET # BLD AUTO: 137 10E9/L (ref 150–450)
PLATELET # BLD EST: ABNORMAL 10*3/UL
RBC # BLD AUTO: 4.01 10E12/L (ref 3.8–5.2)
WBC # BLD AUTO: 4.6 10E9/L (ref 4–11)

## 2018-03-19 PROCEDURE — 36415 COLL VENOUS BLD VENIPUNCTURE: CPT

## 2018-03-19 PROCEDURE — 96401 CHEMO ANTI-NEOPL SQ/IM: CPT

## 2018-03-19 PROCEDURE — 85025 COMPLETE CBC W/AUTO DIFF WBC: CPT | Performed by: INTERNAL MEDICINE

## 2018-03-19 PROCEDURE — 25000128 H RX IP 250 OP 636: Mod: JW | Performed by: INTERNAL MEDICINE

## 2018-03-19 RX ADMIN — BORTEZOMIB 2.7 MG: 3.5 INJECTION, POWDER, LYOPHILIZED, FOR SOLUTION INTRAVENOUS; SUBCUTANEOUS at 15:30

## 2018-03-19 ASSESSMENT — PAIN SCALES - GENERAL: PAINLEVEL: NO PAIN (0)

## 2018-03-19 NOTE — PROGRESS NOTES
Infusion Nursing Note:  Quiana Dunaway presents today for Cycle 10 Day 15 Velcade.    Patient seen by provider today: No   present during visit today: Not Applicable.    Note: N/A.    Intravenous Access:  Lab draw site RAC, Needle type Butterfly, Gauge 23.    Treatment Conditions:  Lab Results   Component Value Date    HGB 11.8 03/19/2018     Lab Results   Component Value Date    WBC 4.6 03/19/2018      Lab Results   Component Value Date    ANEU 2.3 03/19/2018     Lab Results   Component Value Date     03/19/2018      Results reviewed, labs MET treatment parameters, ok to proceed with treatment.      Post Infusion Assessment:  Patient tolerated injection without incident.    Discharge Plan:   Patient declined prescription refills.  Discharge instructions reviewed with: Patient and Family.  Patient and family verbalized understanding of discharge instructions and all questions answered.  AVS to patient via [employer].  Patient will return 3/26/18 for next appointment.   Patient discharged in stable condition accompanied by: self and daughter-in-law.  Departure Mode: Ambulatory.    Isabel Bethea RN

## 2018-03-19 NOTE — MR AVS SNAPSHOT
After Visit Summary   3/19/2018    Quiana Dunaway    MRN: [id number]           Patient Information     Date Of Birth          1935        Visit Information        Provider Department      3/19/2018 2:00 PM  INFUSION CHAIR 5 [facility]        Today's Diagnoses     Multiple myeloma not having achieved remission (H)    -  1       Follow-ups after your visit        Your next 10 appointments already scheduled     Mar 26, 2018 12:30 PM CDT   [facility] C[facility]ar [facility]ung [facility]   [facility]    [address]  [facility] [address] [phone]           Bring a current list of meds and any records pertaining to this visit. For Physicals, please bring immunization records and any forms needing to be filled out. Please arrive 10 minutes early to complete paperwork.            Mar 26, 2018  2:00 PM CDT   Level 2 with  INFUSION CHAIR 9   [facility] ([facility]    [facility]  [address]  [facility] [address]   [phone]            Apr 09, 2018  1:00 PM CDT   Level 2 with  INFUSION CHAIR 20   [facility] ([facility])    [facility]  [address]  [facility] [address] [phone]            Apr 09, 2018  1:30 PM CDT   Return Visit with Alex Parry MD   [facility]    [facility]  [address]  [facility] [address] [phone]            Apr 09, 2018  2:30 PM CDT   Remote PPM Check with RIOS TECH1   [facility]   [place] ([facility]    [address]  [place] [address] [phone]           This appointment is for a remote check of your pacemaker.  This is not an appointment at the office.            Apr 16, 2018  2:00 PM  CDT   Level 2 with SH INFUSION CHAIR 10   [facility]    [facility]  [address]  [facility] [address] [phone]            Apr 17, 2018  2:15 PM CDT   [facility] EP RETURN with Alberto Worthington MD   [facility] ([facility])    [address]  [facility] [address] [phone]            Apr 23, 2018  2:00 PM CDT   Level 2 with SH INFUSION CHAIR 12   [facility]    [facility]  [address]  [facility] [address] [phone]            Apr 30, 2018  2:00 PM CDT   Level 2 with SH INFUSION CHAIR 10   [facility]    [facility]  [address]  [facility] [address] [phone]            May 14, 2018  1:00 PM CDT   Return Visit with Leona Rodriguez PA-C   [facility] ([facility])    [address]  [address]  [facility] [id number] [phone]              Who to contact     If you have questions or need follow up information about today's clinic visit or your schedule please contact [facility] directly at [phone].  Normal or non-critical lab and imaging results will be communicated to you by MyChart, letter or phone within 4 business days after the clinic has received the results. If you do not hear from us within 7 days, please contact the clinic through MyChart or phone. If you have a critical or abnormal lab result, we will notify you by phone as soon as possible.  Submit refill requests through [employer] or call your pharmacy and they will forward the refill request to us. Please allow 3 business days for your refill to be completed.          Additional Information About Your  "Visit        MyChart Information     Content Syndicate: [employer] gives you secure access to your electronic health record. If you see a primary care provider, you can also send messages to your care team and make appointments. If you have questions, please call your primary care clinic.  If you do not have a primary care provider, please call [phone] and they will assist you.        Care EveryWhere ID     This is your Care EveryWhere ID. This could be used by other organizations to access your [place] medical records  [id number]        Your Vitals Were     Pulse Temperature Respirations Height Pulse Oximetry BMI (Body Mass Index)    84 98.3  F (36.8  C) (Oral) 20 1.575 m (5' 2.01\") 95% 29.37 kg/m2       Blood Pressure from Last 3 Encounters:   03/19/18 146/75   03/12/18 108/56   03/05/18 121/61    Weight from Last 3 Encounters:   03/19/18 72.8 kg (160 lb 9.6 oz)   03/12/18 72.2 kg (159 lb 3.2 oz)   03/05/18 72.4 kg (159 lb 9.8 oz)              We Performed the Following     CBC with platelets differential        Primary Care Provider Office Phone # Fax #    César Chung [phone]       [facility] [address] [address]        Equal Access to Services     [facility] LORI : Hadii aad ku hadasho Soterrieali, waaxda luqadaha, qaybta kaalmada adedenzelyada, [place] rodriguez . So [facility] [phone].    ATENCIÓN: Si habla español, tiene a contreras disposición servicios gratuitos de asistencia lingüística. Llame al [phone].    We comply with applicable federal civil rights laws and [place] laws. We do not discriminate on the basis of race, color, national origin, age, disability, sex, sexual orientation, or gender identity.            Thank you!     Thank you for choosing [facility] AND [facility]  for your care. Our goal is always to provide you with excellent care. Hearing back from our patients is one way we can continue to improve our services. " Please take a few minutes to complete the written survey that you may receive in the mail after your visit with us. Thank you!             Your Updated Medication List - Protect others around you: Learn how to safely use, store and throw away your medicines at www.disposemymeds.org.          This list is accurate as of 3/19/18  3:36 PM.  Always use your most recent med list.                   Brand Name Dispense Instructions for use Diagnosis    * ACYCLOVIR PO      Take 400 mg by mouth 2 times daily        * acyclovir 400 MG tablet    ZOVIRAX    60 tablet    Take 1 tablet (400 mg) by mouth 2 times daily Viral Prophylaxis.    Multiple myeloma not having achieved remission (H)       aspirin 81 MG chewable tablet      Take 81 mg by mouth daily        calcium carbonate-vitamin D 600-400 MG-UNIT Chew     180 tablet    Take 1 chew tab by mouth 2 times daily    Multiple myeloma not having achieved remission (H)       * dexamethasone 4 MG tablet    DECADRON    30 tablet    Take 10 tablets (40 mg) by mouth every 7 days Days 1, 8, and 15.    Multiple myeloma not having achieved remission (H)       * dexamethasone 4 MG tablet    DECADRON    20 tablet    Take 10 tablets (40 mg) by mouth every 7 days for 3 doses Days 1, 8, and 15.    Multiple myeloma not having achieved remission (H)       escitalopram 5 MG tablet    LEXAPRO    90 tablet    TAKE 1 TABLET BY MOUTH DAILY    ROBER (generalized anxiety disorder)       IMODIUM A-D PO      Take by mouth as needed        LENalidomide 10 MG Caps capsule CHEMO    REVLIMID    5 capsule    Take 1 capsule (10 mg) by mouth daily for 14 days Days 1 through 14.    Multiple myeloma not having achieved remission (H)       lisinopril 10 MG tablet    PRINIVIL/ZESTRIL    90 tablet    Take 1 tablet (10 mg) by mouth daily    Benign essential hypertension       LORazepam 0.5 MG tablet    ATIVAN    10 tablet    Take 1 tablet (0.5 mg) by mouth every 8 hours as needed (Anxiety, Nausea/Vomiting or Sleep)     Multiple myeloma not having achieved remission (H)       nitroGLYcerin 0.4 MG sublingual tablet    NITROSTAT    25 tablet    For chest pain place 1 tablet under the tongue every 5 minutes for 3 doses. If symptoms persist 5 minutes after 1st dose call 911.    Atypical chest pain       * order for DME     1 Units    Dispense one 4 wheeled walker with hand brakes and seat    Multiple myeloma not having achieved remission (H)       * order for DME     1 Units    Wheelchair.    Need for assistance due to unsteady gait       prochlorperazine 10 MG tablet    COMPAZINE    30 tablet    Take 1 tablet (10 mg) by mouth every 6 hours as needed (Nausea/Vomiting)    Multiple myeloma not having achieved remission (H)       tolterodine 4 MG 24 hr capsule    DETROL LA    90 capsule    Take 1 capsule (4 mg) by mouth daily    Urinary frequency       * Notice:  This list has 6 medication(s) that are the same as other medications prescribed for you. Read the directions carefully, and ask your doctor or other care provider to review them with you.

## 2018-03-21 DIAGNOSIS — C90.00 MULTIPLE MYELOMA NOT HAVING ACHIEVED REMISSION (H): Primary | ICD-10-CM

## 2018-03-21 RX ORDER — LENALIDOMIDE 10 MG/1
10 CAPSULE ORAL DAILY
Qty: 14 CAPSULE | Refills: 0 | Status: SHIPPED | OUTPATIENT
Start: 2018-03-21 | End: 2018-12-26

## 2018-03-21 RX ORDER — SODIUM CHLORIDE 9 MG/ML
1000 INJECTION, SOLUTION INTRAVENOUS CONTINUOUS PRN
Status: CANCELLED
Start: 2018-04-09

## 2018-03-21 RX ORDER — SODIUM CHLORIDE 9 MG/ML
1000 INJECTION, SOLUTION INTRAVENOUS CONTINUOUS PRN
Status: CANCELLED
Start: 2018-03-26

## 2018-03-21 RX ORDER — MEPERIDINE HYDROCHLORIDE 25 MG/ML
25 INJECTION INTRAMUSCULAR; INTRAVENOUS; SUBCUTANEOUS EVERY 30 MIN PRN
Status: CANCELLED | OUTPATIENT
Start: 2018-03-26

## 2018-03-21 RX ORDER — METHYLPREDNISOLONE SODIUM SUCCINATE 125 MG/2ML
125 INJECTION, POWDER, LYOPHILIZED, FOR SOLUTION INTRAMUSCULAR; INTRAVENOUS
Status: CANCELLED
Start: 2018-03-26

## 2018-03-21 RX ORDER — ALBUTEROL SULFATE 90 UG/1
1-2 AEROSOL, METERED RESPIRATORY (INHALATION)
Status: CANCELLED
Start: 2018-03-26

## 2018-03-21 RX ORDER — DIPHENHYDRAMINE HYDROCHLORIDE 50 MG/ML
50 INJECTION INTRAMUSCULAR; INTRAVENOUS
Status: CANCELLED
Start: 2018-04-13

## 2018-03-21 RX ORDER — LORAZEPAM 2 MG/ML
0.5 INJECTION INTRAMUSCULAR EVERY 4 HOURS PRN
Status: CANCELLED
Start: 2018-04-13

## 2018-03-21 RX ORDER — METHYLPREDNISOLONE SODIUM SUCCINATE 125 MG/2ML
125 INJECTION, POWDER, LYOPHILIZED, FOR SOLUTION INTRAMUSCULAR; INTRAVENOUS
Status: CANCELLED
Start: 2018-04-13

## 2018-03-21 RX ORDER — ALBUTEROL SULFATE 0.83 MG/ML
2.5 SOLUTION RESPIRATORY (INHALATION)
Status: CANCELLED | OUTPATIENT
Start: 2018-04-09

## 2018-03-21 RX ORDER — EPINEPHRINE 0.3 MG/.3ML
0.3 INJECTION SUBCUTANEOUS EVERY 5 MIN PRN
Status: CANCELLED | OUTPATIENT
Start: 2018-03-26

## 2018-03-21 RX ORDER — EPINEPHRINE 1 MG/ML
0.3 INJECTION, SOLUTION INTRAMUSCULAR; SUBCUTANEOUS EVERY 5 MIN PRN
Status: CANCELLED | OUTPATIENT
Start: 2018-04-13

## 2018-03-21 RX ORDER — DIPHENHYDRAMINE HYDROCHLORIDE 50 MG/ML
50 INJECTION INTRAMUSCULAR; INTRAVENOUS
Status: CANCELLED
Start: 2018-04-09

## 2018-03-21 RX ORDER — ALBUTEROL SULFATE 90 UG/1
1-2 AEROSOL, METERED RESPIRATORY (INHALATION)
Status: CANCELLED
Start: 2018-04-09

## 2018-03-21 RX ORDER — ALBUTEROL SULFATE 0.83 MG/ML
2.5 SOLUTION RESPIRATORY (INHALATION)
Status: CANCELLED | OUTPATIENT
Start: 2018-03-26

## 2018-03-21 RX ORDER — EPINEPHRINE 0.3 MG/.3ML
0.3 INJECTION SUBCUTANEOUS EVERY 5 MIN PRN
Status: CANCELLED | OUTPATIENT
Start: 2018-04-13

## 2018-03-21 RX ORDER — LORAZEPAM 2 MG/ML
0.5 INJECTION INTRAMUSCULAR EVERY 4 HOURS PRN
Status: CANCELLED
Start: 2018-04-09

## 2018-03-21 RX ORDER — EPINEPHRINE 1 MG/ML
0.3 INJECTION, SOLUTION INTRAMUSCULAR; SUBCUTANEOUS EVERY 5 MIN PRN
Status: CANCELLED | OUTPATIENT
Start: 2018-03-26

## 2018-03-21 RX ORDER — EPINEPHRINE 1 MG/ML
0.3 INJECTION, SOLUTION INTRAMUSCULAR; SUBCUTANEOUS EVERY 5 MIN PRN
Status: CANCELLED | OUTPATIENT
Start: 2018-04-09

## 2018-03-21 RX ORDER — ALBUTEROL SULFATE 90 UG/1
1-2 AEROSOL, METERED RESPIRATORY (INHALATION)
Status: CANCELLED
Start: 2018-04-13

## 2018-03-21 RX ORDER — DEXAMETHASONE 4 MG/1
40 TABLET ORAL
Qty: 30 TABLET | Refills: 0 | Status: SHIPPED | OUTPATIENT
Start: 2018-03-21 | End: 2018-03-26

## 2018-03-21 RX ORDER — DIPHENHYDRAMINE HYDROCHLORIDE 50 MG/ML
50 INJECTION INTRAMUSCULAR; INTRAVENOUS
Status: CANCELLED
Start: 2018-03-26

## 2018-03-21 RX ORDER — METHYLPREDNISOLONE SODIUM SUCCINATE 125 MG/2ML
125 INJECTION, POWDER, LYOPHILIZED, FOR SOLUTION INTRAMUSCULAR; INTRAVENOUS
Status: CANCELLED
Start: 2018-04-09

## 2018-03-21 RX ORDER — MEPERIDINE HYDROCHLORIDE 25 MG/ML
25 INJECTION INTRAMUSCULAR; INTRAVENOUS; SUBCUTANEOUS EVERY 30 MIN PRN
Status: CANCELLED | OUTPATIENT
Start: 2018-04-09

## 2018-03-21 RX ORDER — SODIUM CHLORIDE 9 MG/ML
1000 INJECTION, SOLUTION INTRAVENOUS CONTINUOUS PRN
Status: CANCELLED
Start: 2018-04-13

## 2018-03-21 RX ORDER — LORAZEPAM 2 MG/ML
0.5 INJECTION INTRAMUSCULAR EVERY 4 HOURS PRN
Status: CANCELLED
Start: 2018-03-26

## 2018-03-21 RX ORDER — MEPERIDINE HYDROCHLORIDE 25 MG/ML
25 INJECTION INTRAMUSCULAR; INTRAVENOUS; SUBCUTANEOUS EVERY 30 MIN PRN
Status: CANCELLED | OUTPATIENT
Start: 2018-04-13

## 2018-03-21 RX ORDER — ALBUTEROL SULFATE 0.83 MG/ML
2.5 SOLUTION RESPIRATORY (INHALATION)
Status: CANCELLED | OUTPATIENT
Start: 2018-04-13

## 2018-03-21 RX ORDER — EPINEPHRINE 0.3 MG/.3ML
0.3 INJECTION SUBCUTANEOUS EVERY 5 MIN PRN
Status: CANCELLED | OUTPATIENT
Start: 2018-04-09

## 2018-03-26 ENCOUNTER — HOSPITAL ENCOUNTER (OUTPATIENT)
Facility: CLINIC | Age: 83
Setting detail: SPECIMEN
Discharge: HOME | End: 2018-03-26
Attending: INTERNAL MEDICINE | Admitting: INTERNAL MEDICINE
Payer: MEDICARE

## 2018-03-26 ENCOUNTER — OFFICE VISIT (OUTPATIENT)
Dept: FAMILY MEDICINE | Facility: CLINIC | Age: 83
End: 2018-03-26
Payer: MEDICARE

## 2018-03-26 ENCOUNTER — INFUSION THERAPY VISIT (OUTPATIENT)
Dept: INFUSION THERAPY | Facility: CLINIC | Age: 83
End: 2018-03-26
Attending: INTERNAL MEDICINE
Payer: MEDICARE

## 2018-03-26 ENCOUNTER — DOCUMENTATION ONLY (OUTPATIENT)
Dept: PHARMACY | Facility: CLINIC | Age: 83
End: 2018-03-26

## 2018-03-26 VITALS
SYSTOLIC BLOOD PRESSURE: 129 MMHG | BODY MASS INDEX: 29.63 KG/M2 | WEIGHT: 161 LBS | HEART RATE: 69 BPM | OXYGEN SATURATION: 96 % | HEIGHT: 62 IN | DIASTOLIC BLOOD PRESSURE: 71 MMHG | TEMPERATURE: 99.3 F

## 2018-03-26 VITALS
RESPIRATION RATE: 16 BRPM | HEIGHT: 62 IN | TEMPERATURE: 98.4 F | SYSTOLIC BLOOD PRESSURE: 142 MMHG | OXYGEN SATURATION: 95 % | HEART RATE: 89 BPM | WEIGHT: 160.4 LBS | BODY MASS INDEX: 29.52 KG/M2 | DIASTOLIC BLOOD PRESSURE: 80 MMHG

## 2018-03-26 DIAGNOSIS — C90.00 MULTIPLE MYELOMA NOT HAVING ACHIEVED REMISSION (H): ICD-10-CM

## 2018-03-26 DIAGNOSIS — I10 BENIGN ESSENTIAL HYPERTENSION: ICD-10-CM

## 2018-03-26 DIAGNOSIS — C90.00 MULTIPLE MYELOMA NOT HAVING ACHIEVED REMISSION (H): Primary | ICD-10-CM

## 2018-03-26 DIAGNOSIS — K42.9 UMBILICAL HERNIA WITHOUT OBSTRUCTION AND WITHOUT GANGRENE: Primary | ICD-10-CM

## 2018-03-26 LAB
ALBUMIN SERPL-MCNC: 3.5 G/DL (ref 3.4–5)
ALP SERPL-CCNC: 86 U/L (ref 40–150)
ALT SERPL W P-5'-P-CCNC: 17 U/L (ref 0–50)
ANION GAP SERPL CALCULATED.3IONS-SCNC: 7 MMOL/L (ref 3–14)
AST SERPL W P-5'-P-CCNC: 16 U/L (ref 0–45)
BASOPHILS # BLD AUTO: 0 10E9/L (ref 0–0.2)
BASOPHILS NFR BLD AUTO: 0.4 %
BILIRUB SERPL-MCNC: 0.5 MG/DL (ref 0.2–1.3)
BUN SERPL-MCNC: 17 MG/DL (ref 7–30)
CALCIUM SERPL-MCNC: 8.2 MG/DL (ref 8.5–10.1)
CHLORIDE SERPL-SCNC: 107 MMOL/L (ref 94–109)
CO2 SERPL-SCNC: 29 MMOL/L (ref 20–32)
CREAT SERPL-MCNC: 0.57 MG/DL (ref 0.52–1.04)
DIFFERENTIAL METHOD BLD: ABNORMAL
EOSINOPHIL # BLD AUTO: 0.2 10E9/L (ref 0–0.7)
EOSINOPHIL NFR BLD AUTO: 4 %
ERYTHROCYTE [DISTWIDTH] IN BLOOD BY AUTOMATED COUNT: 15.1 % (ref 10–15)
GFR SERPL CREATININE-BSD FRML MDRD: >90 ML/MIN/1.7M2
GLUCOSE SERPL-MCNC: 96 MG/DL (ref 70–99)
HCT VFR BLD AUTO: 34.7 % (ref 35–47)
HGB BLD-MCNC: 11.7 G/DL (ref 11.7–15.7)
IMM GRANULOCYTES # BLD: 0 10E9/L (ref 0–0.4)
IMM GRANULOCYTES NFR BLD: 0.2 %
LYMPHOCYTES # BLD AUTO: 0.9 10E9/L (ref 0.8–5.3)
LYMPHOCYTES NFR BLD AUTO: 19.2 %
MCH RBC QN AUTO: 29.4 PG (ref 26.5–33)
MCHC RBC AUTO-ENTMCNC: 33.7 G/DL (ref 31.5–36.5)
MCV RBC AUTO: 87 FL (ref 78–100)
MONOCYTES # BLD AUTO: 1.2 10E9/L (ref 0–1.3)
MONOCYTES NFR BLD AUTO: 24.7 %
NEUTROPHILS # BLD AUTO: 2.5 10E9/L (ref 1.6–8.3)
NEUTROPHILS NFR BLD AUTO: 51.5 %
NRBC # BLD AUTO: 0 10*3/UL
NRBC BLD AUTO-RTO: 0 /100
PLATELET # BLD AUTO: 141 10E9/L (ref 150–450)
POTASSIUM SERPL-SCNC: 3.4 MMOL/L (ref 3.4–5.3)
PROT SERPL-MCNC: 6.5 G/DL (ref 6.8–8.8)
RBC # BLD AUTO: 3.98 10E12/L (ref 3.8–5.2)
SODIUM SERPL-SCNC: 143 MMOL/L (ref 133–144)
WBC # BLD AUTO: 4.8 10E9/L (ref 4–11)

## 2018-03-26 PROCEDURE — 85025 COMPLETE CBC W/AUTO DIFF WBC: CPT | Performed by: INTERNAL MEDICINE

## 2018-03-26 PROCEDURE — 99213 OFFICE O/P EST LOW 20 MIN: CPT | Performed by: INTERNAL MEDICINE

## 2018-03-26 PROCEDURE — 00000402 ZZHCL STATISTIC TOTAL PROTEIN: Performed by: INTERNAL MEDICINE

## 2018-03-26 PROCEDURE — 84165 PROTEIN E-PHORESIS SERUM: CPT | Performed by: INTERNAL MEDICINE

## 2018-03-26 PROCEDURE — 96401 CHEMO ANTI-NEOPL SQ/IM: CPT

## 2018-03-26 PROCEDURE — 82784 ASSAY IGA/IGD/IGG/IGM EACH: CPT | Performed by: INTERNAL MEDICINE

## 2018-03-26 PROCEDURE — 83883 ASSAY NEPHELOMETRY NOT SPEC: CPT | Performed by: INTERNAL MEDICINE

## 2018-03-26 PROCEDURE — 25000128 H RX IP 250 OP 636: Mod: JW | Performed by: INTERNAL MEDICINE

## 2018-03-26 PROCEDURE — 80053 COMPREHEN METABOLIC PANEL: CPT | Performed by: INTERNAL MEDICINE

## 2018-03-26 RX ADMIN — BORTEZOMIB 2.7 MG: 3.5 INJECTION, POWDER, LYOPHILIZED, FOR SOLUTION INTRAVENOUS; SUBCUTANEOUS at 15:12

## 2018-03-26 ASSESSMENT — PAIN SCALES - GENERAL: PAINLEVEL: NO PAIN (0)

## 2018-03-26 NOTE — PROGRESS NOTES
Oral Chemotherapy Monitoring Program.    Patient currently on revlimid therapy.    Reviewed labs from 3/26/18. Patient is due to restart today.     No concerning abnormalities. Met with Linsey briefly. She has much more energy and is doing well today. She has no complaints at this time.     Questions answered to patient's satisfaction.    Will follow up in 3 weeks with repeat labs.    Debra Matos PharmD  March 26, 2018

## 2018-03-26 NOTE — PROGRESS NOTES
SUBJECTIVE:   Quiana Dunaway is a 82 year old female who presents to clinic today for the following health issues:    Hypertension Follow-up      Outpatient blood pressures are not being checked.    Low Salt Diet: low salt        82-year-old female with multiple myeloma undergoing chemotherapy with hypertension and anxiety as well.  She receives injections in her abdomen for her multiple myeloma at the oncology clinic and a nurse there instructed the patient to follow-up with me because of an abdominal hernia that was noticed incidentally.  The patient has never had symptoms related to her abdominal hernia.  The patient denies nausea or vomiting.  There is also some question of pancreatic cyst noticed on an CT scan of the chest in March 2017.  The patient was contacted to have a follow-up MRI scan to monitor these pancreatic cysts, however, she was noted to have stability of the cyst on a PET scan in July 2017 and the radiologist thought that the cysts were most likely benign.  The patient has been tolerating chemotherapy with the exception of intermittent fatigue side effects.  Overall, she is feeling well and has been working 2 jobs as per her routine.    Problem list and histories reviewed & adjusted, as indicated.  Additional history: as documented    Patient Active Problem List   Diagnosis     Benign essential hypertension     Pancytopenia (H)     Multiple myeloma not having achieved remission (H)     Benzodiazepine overdose, accidental or unintentional, initial encounter     Atrial fibrillation, unspecified type (H)     Hypokalemia     Past Surgical History:   Procedure Laterality Date     BONE MARROW BIOPSY, BONE SPECIMEN, NEEDLE/TROCAR N/A 7/12/2017    Procedure: BIOPSY BONE MARROW;  BONE MARROW BIOPSY ;  Surgeon: Grace Vela MD;  Location: [facility]       Social History   Substance Use Topics     Smoking status: Never Smoker     Smokeless tobacco: Never Used     Alcohol use No     Family  History   Problem Relation Age of Onset     Unknown/Adopted Mother      Unknown/Adopted Father          Current Outpatient Prescriptions   Medication Sig Dispense Refill     LENalidomide (REVLIMID) 10 MG CAPS capsule CHEMO Take 1 capsule (10 mg) by mouth daily for 14 days Days 1 through 14. 14 capsule 0     [DISCONTINUED] dexamethasone (DECADRON) 4 MG tablet Take 10 tablets (40 mg) by mouth every 7 days for 3 doses Days 1, 8, and 15. 30 tablet 0     tolterodine (DETROL LA) 4 MG 24 hr capsule Take 1 capsule (4 mg) by mouth daily 90 capsule 3     acyclovir (ZOVIRAX) 400 MG tablet Take 1 tablet (400 mg) by mouth 2 times daily Viral Prophylaxis. 60 tablet 5     Loperamide HCl (IMODIUM A-D PO) Take by mouth as needed       escitalopram (LEXAPRO) 5 MG tablet TAKE 1 TABLET BY MOUTH DAILY 90 tablet 1     prochlorperazine (COMPAZINE) 10 MG tablet Take 1 tablet (10 mg) by mouth every 6 hours as needed (Nausea/Vomiting) 30 tablet 3     dexamethasone (DECADRON) 4 MG tablet Take 10 tablets (40 mg) by mouth every 7 days Days 1, 8, and 15. 30 tablet 0     LORazepam (ATIVAN) 0.5 MG tablet Take 1 tablet (0.5 mg) by mouth every 8 hours as needed (Anxiety, Nausea/Vomiting or Sleep) 10 tablet 3     order for DME Wheelchair. 1 Units 0     lisinopril (PRINIVIL/ZESTRIL) 10 MG tablet Take 1 tablet (10 mg) by mouth daily 90 tablet 3     order for DME Dispense one 4 wheeled walker with hand brakes and seat 1 Units 0     calcium carbonate-vitamin D 600-400 MG-UNIT CHEW Take 1 chew tab by mouth 2 times daily 180 tablet 3     nitroglycerin (NITROSTAT) 0.4 MG sublingual tablet For chest pain place 1 tablet under the tongue every 5 minutes for 3 doses. If symptoms persist 5 minutes after 1st dose call 911. 25 tablet 0     aspirin 81 MG chewable tablet Take 81 mg by mouth daily        [DISCONTINUED] ACYCLOVIR PO Take 400 mg by mouth 2 times daily       No Known Allergies    Reviewed and updated as needed this visit by clinical staff       Reviewed  "and updated as needed this visit by Provider         ROS:  Constitutional, HEENT, cardiovascular, pulmonary, gi and gu systems are negative, except as otherwise noted.    OBJECTIVE:     /71 (BP Location: Right arm, Cuff Size: Adult Regular)  Pulse 69  Temp 99.3  F (37.4  C) (Tympanic)  Ht 5' 2\" (1.575 m)  Wt 161 lb (73 kg)  SpO2 96%  Breastfeeding? No  BMI 29.45 kg/m2  Body mass index is 29.45 kg/(m^2).  General: This is a well-appearing elderly female in no acute distress.  She does not appear toxic.  She appears well-nourished.  Abdomen: Soft, not distended, nontender, bowel sounds present, no masses, no organomegaly, there is a moderately sized umbilical hernia that is not tender to touch.  Extremities: Trace edema in bilateral lower extremities to just above the ankles.  Neurological: Alert and oriented to person place and time, cranial nerves II to XII appear grossly intact, normal gait.  Mental status: Appropriate mood and affect, well-groomed, delightful.    Diagnostic Test Results:  Results for orders placed or performed in visit on 03/19/18   CBC with platelets differential   Result Value Ref Range    WBC 4.6 4.0 - 11.0 10e9/L    RBC Count 4.01 3.8 - 5.2 10e12/L    Hemoglobin 11.8 11.7 - 15.7 g/dL    Hematocrit 34.9 (L) 35.0 - 47.0 %    MCV 87 78 - 100 fl    MCH 29.4 26.5 - 33.0 pg    MCHC 33.8 31.5 - 36.5 g/dL    RDW 14.9 10.0 - 15.0 %    Platelet Count 137 (L) 150 - 450 10e9/L    Diff Method Automated Method     % Neutrophils 50.4 %    % Lymphocytes 18.6 %    % Monocytes 23.5 %    % Eosinophils 6.9 %    % Basophils 0.4 %    % Immature Granulocytes 0.2 %    Nucleated RBCs 0 0 /100    Absolute Neutrophil 2.3 1.6 - 8.3 10e9/L    Absolute Lymphocytes 0.9 0.8 - 5.3 10e9/L    Absolute Monocytes 1.1 0.0 - 1.3 10e9/L    Absolute Eosinophils 0.3 0.0 - 0.7 10e9/L    Absolute Basophils 0.0 0.0 - 0.2 10e9/L    Abs Immature Granulocytes 0.0 0 - 0.4 10e9/L    Absolute Nucleated RBC 0.0     Ovalocytes " Slight     Platelet Estimate       Automated count confirmed.  Platelet morphology is normal.       ASSESSMENT/PLAN:         1. Umbilical hernia without obstruction and without gangrene  As this hernia is asymptomatic, at this point, given that she is in the midst of treatment for her multiple myeloma, I think the risks of surgical intervention to repair this hernia would outweigh the benefits.  The patient was educated on alarm symptoms.  She will contact me if she has pain or unexplained nausea or vomiting.    2. Multiple myeloma not having achieved remission (H)  Continue follow-up with Dr. Parry from oncology for treatment of the myeloma.    3. Benign essential hypertension  Her blood pressure is under very good control in clinic today on her current dose of lisinopril.      FUTURE APPOINTMENTS:       -3 months or sooner as needed    César Chung MD  [facility]

## 2018-03-26 NOTE — NURSING NOTE
"Chief Complaint   Patient presents with     RECHECK       Initial /71 (BP Location: Right arm, Cuff Size: Adult Regular)  Pulse 69  Temp 99.3  F (37.4  C) (Tympanic)  Ht 5' 2\" (1.575 m)  Wt 161 lb (73 kg)  SpO2 96%  Breastfeeding? No  BMI 29.45 kg/m2 Estimated body mass index is 29.45 kg/(m^2) as calculated from the following:    Height as of this encounter: 5' 2\" (1.575 m).    Weight as of this encounter: 161 lb (73 kg).  Medication Reconciliation: complete   Bonny Zhang MA        "

## 2018-03-26 NOTE — MR AVS SNAPSHOT
After Visit Summary   3/26/2018    Quiana Dunaway    MRN: [id number]           Patient Information     Date Of Birth          1935        Visit Information        Provider Department      3/26/2018 2:00 PM  INFUSION CHAIR 9 [facility]        Today's Diagnoses     Multiple myeloma not having achieved remission (H)    -  1       Follow-ups after your visit        Your next 10 appointments already scheduled     Apr 09, 2018  1:00 PM CDT   Level 2 with  INFUSION CHAIR 20   [facility] ([facility])    [facility] [place] [id number]  [place] [address]   [phone]            Apr 09, 2018  1:30 PM CDT   Return Visit with Alex Parry MD   [facility] ([facility])    [facility]  [address]  [place] [address]   [phone]            Apr 09, 2018  2:30 PM CDT   Remote PPM Check with RIOS TECH1   [facility] ([facility])    [address]  [facility] [address] [phone] OPT 2           This appointment is for a remote check of your pacemaker.  This is not an appointment at the office.            Apr 16, 2018  2:00 PM CDT   Level 2 with  INFUSION CHAIR 10   [facility] ([facility])    [facility]  [address]  [place] [address]   [phone]            Apr 17, 2018  2:15 PM CDT   UMP EP RETURN with Alberto Worthington MD   [facility] ([facility])    [address]  [place] [address] [phone] OPT 2            Apr 23, 2018  2:00 PM CDT   Level 2 with  INFUSION CHAIR 12   [facility] ([facility])    [facility]  [address]  [place] [address]   [phone]            Apr 30, 2018  2:00 PM CDT   Level 2 with  INFUSION CHAIR 10   [facility] ([facility])    [facility]  [address]  [place] [address]   [phone]            May 07, 2018  1:40 PM CDT   Return Visit with Alex Parry MD   [facility] ([facility])    [facility]  [address]  [place] [address]   [phone]            May 07, 2018  2:00 PM CDT   Level 2 with  INFUSION CHAIR 7   [facility] ([facility])    [facility] [place]  [address]  [place] [address]   [phone]            May 14, 2018  1:00 PM CDT   Return Visit with Leona Rodriguez PA-C   [facility] [place] ([facility] [place])    [address]  [address]  [facility] [address] [phone]              Who to contact     If you have questions or need follow up information about today's clinic visit or your schedule please contact [facility] directly at [phone].  Normal or non-critical lab and imaging results will be communicated to you by [employer], letter or phone within 4 business days after the clinic has received the results. If you do not hear from us within 7 days, please contact the clinic through [employer] or phone. If you have a critical or abnormal lab result, we will notify you by phone as soon as possible.  Submit refill requests through [employer] or call your pharmacy and they will forward the refill request to us. Please allow 3 business days for your refill to be completed.          Additional Information About Your Visit        [employer] Information     [employer] gives you secure access to your electronic health record. If you see a primary care provider, you can also send messages to  "your care team and make appointments. If you have questions, please call your primary care clinic.  If you do not have a primary care provider, please call [phone] and they will assist you.        Care EveryWhere ID     This is your Care EveryWhere ID. This could be used by other organizations to access your [facility] medical records  [id number]        Your Vitals Were     Pulse Temperature Respirations Height Pulse Oximetry BMI (Body Mass Index)    89 98.4  F (36.9  C) (Oral) 16 1.575 m (5' 2.01\") 95% 29.33 kg/m2       Blood Pressure from Last 3 Encounters:   03/26/18 142/80   03/26/18 129/71   03/19/18 146/75    Weight from Last 3 Encounters:   03/26/18 72.8 kg (160 lb 6.4 oz)   03/26/18 73 kg (161 lb)   03/19/18 72.8 kg (160 lb 9.6 oz)              We Performed the Following     CBC with platelets differential     Comprehensive metabolic panel     IgG     Kappa and lambda light chain     Protein electrophoresis        Primary Care Provider Office Phone # Fax #    César Chung [phone]       [facility] [address] [facility] [address]  [place] [address]        Equal Access to Services     LILLIANA SANDOVAL : Hadii aad ku hadasho Soomaali, waaxda luqadaha, qaybta kaalmada adeegyada, waxay idiin haymarnien carlos young lasultana . So [facility] [phone].    ATENCIÓN: Si habla español, tiene a contreras disposición servicios gratuitos de asistencia lingüística. Llame al [phone].    We comply with applicable federal civil rights laws and [place] laws. We do not discriminate on the basis of race, color, national origin, age, disability, sex, sexual orientation, or gender identity.            Thank you!     Thank you for choosing [facility]  for your care. Our goal is always to provide you with excellent care. Hearing back from our patients is one way we can continue to improve our services. Please take a few minutes to complete the written survey that you may receive " in the mail after your visit with us. Thank you!             Your Updated Medication List - Protect others around you: Learn how to safely use, store and throw away your medicines at www.disposemymeds.org.          This list is accurate as of 3/26/18  3:32 PM.  Always use your most recent med list.                   Brand Name Dispense Instructions for use Diagnosis    acyclovir 400 MG tablet    ZOVIRAX    60 tablet    Take 1 tablet (400 mg) by mouth 2 times daily Viral Prophylaxis.    Multiple myeloma not having achieved remission (H)       aspirin 81 MG chewable tablet      Take 81 mg by mouth daily        calcium carbonate-vitamin D 600-400 MG-UNIT Chew     180 tablet    Take 1 chew tab by mouth 2 times daily    Multiple myeloma not having achieved remission (H)       * dexamethasone 4 MG tablet    DECADRON    30 tablet    Take 10 tablets (40 mg) by mouth every 7 days Days 1, 8, and 15.    Multiple myeloma not having achieved remission (H)       * dexamethasone 4 MG tablet    DECADRON    20 tablet    Take 10 tablets (40 mg) by mouth every 7 days for 3 doses Days 1, 8, and 15.    Multiple myeloma not having achieved remission (H)       escitalopram 5 MG tablet    LEXAPRO    90 tablet    TAKE 1 TABLET BY MOUTH DAILY    ROBER (generalized anxiety disorder)       IMODIUM A-D PO      Take by mouth as needed        * LENalidomide 10 MG Caps capsule CHEMO    REVLIMID    5 capsule    Take 1 capsule (10 mg) by mouth daily for 14 days Days 1 through 14.    Multiple myeloma not having achieved remission (H)       * LENalidomide 10 MG Caps capsule CHEMO    REVLIMID    14 capsule    Take 1 capsule (10 mg) by mouth daily for 14 days Days 1 through 14.    Multiple myeloma not having achieved remission (H)       lisinopril 10 MG tablet    PRINIVIL/ZESTRIL    90 tablet    Take 1 tablet (10 mg) by mouth daily    Benign essential hypertension       LORazepam 0.5 MG tablet    ATIVAN    10 tablet    Take 1 tablet (0.5 mg) by mouth  every 8 hours as needed (Anxiety, Nausea/Vomiting or Sleep)    Multiple myeloma not having achieved remission (H)       nitroGLYcerin 0.4 MG sublingual tablet    NITROSTAT    25 tablet    For chest pain place 1 tablet under the tongue every 5 minutes for 3 doses. If symptoms persist 5 minutes after 1st dose call 911.    Atypical chest pain       * order for DME     1 Units    Dispense one 4 wheeled walker with hand brakes and seat    Multiple myeloma not having achieved remission (H)       * order for DME     1 Units    Wheelchair.    Need for assistance due to unsteady gait       prochlorperazine 10 MG tablet    COMPAZINE    30 tablet    Take 1 tablet (10 mg) by mouth every 6 hours as needed (Nausea/Vomiting)    Multiple myeloma not having achieved remission (H)       tolterodine 4 MG 24 hr capsule    DETROL LA    90 capsule    Take 1 capsule (4 mg) by mouth daily    Urinary frequency       * Notice:  This list has 6 medication(s) that are the same as other medications prescribed for you. Read the directions carefully, and ask your doctor or other care provider to review them with you.

## 2018-03-26 NOTE — PROGRESS NOTES
Infusion Nursing Note:  Quiana Dunaway presents today for C11 D1 Velcade.    Patient seen by provider today: No   present during visit today: Not Applicable.    Note: Refer to doc flowsheet for assessment, no new concerns. Patient verbalizes understanding of correct dosing/schedule of oral dexamethasone and revlimid as ordered, denies need for refill at this time.      Intravenous Access:  Lab draw site left AC, Needle type butterfly, Gauge 21.  Labs drawn without difficulty.    Treatment Conditions:  Lab Results   Component Value Date    HGB 11.7 03/26/2018     Lab Results   Component Value Date    WBC 4.8 03/26/2018      Lab Results   Component Value Date    ANEU 2.5 03/26/2018     Lab Results   Component Value Date     03/26/2018      Lab Results   Component Value Date     03/26/2018                   Lab Results   Component Value Date    POTASSIUM 3.4 03/26/2018          Lab Results   Component Value Date    CR  Creat clearance 0.57  88.96mL/min 03/26/2018 03/26/2018                   Lab Results   Component Value Date    HUMBERTO  Corrected calcium 8.2  8.6 03/26/2018 03/26/2018                Lab Results   Component Value Date    BILITOTAL 0.5 03/26/2018           Lab Results   Component Value Date    ALBUMIN 3.5 03/26/2018                    Lab Results   Component Value Date    ALT 17 03/26/2018           Lab Results   Component Value Date    AST 16 03/26/2018       Results reviewed, labs MET treatment parameters, ok to proceed with treatment.      Post Infusion Assessment:  Patient tolerated injection without incident. Velcade given SQ in right mid abdomen.  Site patent and intact, free from redness, edema or discomfort.  No evidence of extravasations.  Access discontinued per protocol.    Discharge Plan:   Discharge instructions reviewed with: Patient.  Patient and/or family verbalized understanding of discharge instructions and all questions answered.  AVS to patient via [employer].   Patient will return 4/9 for next appointment.   Patient discharged in stable condition accompanied by: daughter-in-law.  Departure Mode: Ambulatory.    Amanda Howe RN

## 2018-03-26 NOTE — MR AVS SNAPSHOT
After Visit Summary   3/26/2018    Quiana Dunaway    MRN: [id number]           Patient Information     Date Of Birth          1935        Visit Information        Provider Department      3/26/2018 12:30 PM César Chung MD [facility]        Today's Diagnoses     Umbilical hernia without obstruction and without gangrene    -  1    Multiple myeloma not having achieved remission (H)        Benign essential hypertension           Follow-ups after your visit        Your next 10 appointments already scheduled     Apr 09, 2018  1:00 PM CDT   Level 2 with  INFUSION CHAIR 20   [facility] ([facility])    [facility]  [address]  [place] [address]   [phone]            Apr 09, 2018  1:30 PM CDT   Return Visit with Alex Parry MD   [facility] ([facility])    [facility]  [address]  [place] [address]   [phone]            Apr 09, 2018  2:30 PM CDT   Remote PPM Check with RIOS TECH1   [facility] ([facility])    [address]  [place] [address] [phone] OPT 2           This appointment is for a remote check of your pacemaker.  This is not an appointment at the office.            Apr 16, 2018  2:00 PM CDT   Level 2 with  INFUSION CHAIR 10   [facility] [place] ([facility])    [facility]  [address]  [place] [address]   [phone]            Apr 17, 2018  2:15 PM CDT   [facility] EP RETURN with Alberto Worthington MD   [facility] ([facility])    [address]  [place] [address] [phone] OPT 2            Apr 23, 2018  2:00 PM CDT   Level 2 with  INFUSION CHAIR 12   [facility]  ([facility])    [facility]  [address]  [place] [address]   [phone]            Apr 30, 2018  2:00 PM CDT   Level 2 with SH INFUSION CHAIR 10   [facility] ([facility])    [facility]  [address]  [place] [address] [phone]            May 07, 2018  1:40 PM CDT   Return Visit with Alex Parry MD   [facility] ([facility])    [facility]  [address]  [place] [address] [phone]            May 07, 2018  2:00 PM CDT   Level 2 with  INFUSION CHAIR 7   [facility] ([facility])    [facility]  [address]  [place] [address] [phone]            May 14, 2018  1:00 PM CDT   Return Visit with Leona Rodriguez PA-C   [facility] [place] ([facility] [place])    [address]  [address]  [facility] [address] [phone]              Who to contact     If you have questions or need follow up information about today's clinic visit or your schedule please contact [facility] directly at [phone].  Normal or non-critical lab and imaging results will be communicated to you by [employer], letter or phone within 4 business days after the clinic has received the results. If you do not hear from us within 7 days, please contact the clinic through [employer] or phone. If you have a critical or abnormal lab result, we will notify you by phone as soon as possible.  Submit refill requests through [employer] or call your pharmacy and they will forward the refill request to us. Please allow 3 business days for your refill to be completed.          Additional Information About Your Visit        [employer] Information     [employer] gives you secure access to your electronic health record. If  "you see a primary care provider, you can also send messages to your care team and make appointments. If you have questions, please call your primary care clinic.  If you do not have a primary care provider, please call [phone] and they will assist you.        Care EveryWhere ID     This is your Care EveryWhere ID. This could be used by other organizations to access your [facility] medical records  [id number]        Your Vitals Were     Pulse Temperature Height Pulse Oximetry Breastfeeding? BMI (Body Mass Index)    69 99.3  F (37.4  C) (Tympanic) 5' 2\" (1.575 m) 96% No 29.45 kg/m2       Blood Pressure from Last 3 Encounters:   03/26/18 142/80   03/26/18 129/71   03/19/18 146/75    Weight from Last 3 Encounters:   03/26/18 160 lb 6.4 oz (72.8 kg)   03/26/18 161 lb (73 kg)   03/19/18 160 lb 9.6 oz (72.8 kg)              Today, you had the following     No orders found for display       Primary Care Provider Office Phone # Fax #    César Chung [phone]       [facility] [address]  [facility] [address]        Equal Access to Services     LILLIANA SANDOVAL : Hadii aad ku hadasho Soomaali, waaxda luqadaha, qaybta kaalmada adeegyada, waxay idiin haymarnien carlos young lasultana . So [facility] [phone].    ATENCIÓN: Si habla español, tiene a contreras disposición servicios gratuitos de asistencia lingüística. Llame al [phone].    We comply with applicable federal civil rights laws and [place] laws. We do not discriminate on the basis of race, color, national origin, age, disability, sex, sexual orientation, or gender identity.            Thank you!     Thank you for choosing [facility]  for your care. Our goal is always to provide you with excellent care. Hearing back from our patients is one way we can continue to improve our services. Please take a few minutes to complete the written survey that you may receive in the mail after your visit with us. Thank you!           "   Your Updated Medication List - Protect others around you: Learn how to safely use, store and throw away your medicines at www.disposemymeds.org.          This list is accurate as of 3/26/18  2:44 PM.  Always use your most recent med list.                   Brand Name Dispense Instructions for use Diagnosis    acyclovir 400 MG tablet    ZOVIRAX    60 tablet    Take 1 tablet (400 mg) by mouth 2 times daily Viral Prophylaxis.    Multiple myeloma not having achieved remission (H)       aspirin 81 MG chewable tablet      Take 81 mg by mouth daily        calcium carbonate-vitamin D 600-400 MG-UNIT Chew     180 tablet    Take 1 chew tab by mouth 2 times daily    Multiple myeloma not having achieved remission (H)       * dexamethasone 4 MG tablet    DECADRON    30 tablet    Take 10 tablets (40 mg) by mouth every 7 days Days 1, 8, and 15.    Multiple myeloma not having achieved remission (H)       * dexamethasone 4 MG tablet    DECADRON    20 tablet    Take 10 tablets (40 mg) by mouth every 7 days for 3 doses Days 1, 8, and 15.    Multiple myeloma not having achieved remission (H)       escitalopram 5 MG tablet    LEXAPRO    90 tablet    TAKE 1 TABLET BY MOUTH DAILY    ROBER (generalized anxiety disorder)       IMODIUM A-D PO      Take by mouth as needed        * LENalidomide 10 MG Caps capsule CHEMO    REVLIMID    5 capsule    Take 1 capsule (10 mg) by mouth daily for 14 days Days 1 through 14.    Multiple myeloma not having achieved remission (H)       * LENalidomide 10 MG Caps capsule CHEMO    REVLIMID    14 capsule    Take 1 capsule (10 mg) by mouth daily for 14 days Days 1 through 14.    Multiple myeloma not having achieved remission (H)       lisinopril 10 MG tablet    PRINIVIL/ZESTRIL    90 tablet    Take 1 tablet (10 mg) by mouth daily    Benign essential hypertension       LORazepam 0.5 MG tablet    ATIVAN    10 tablet    Take 1 tablet (0.5 mg) by mouth every 8 hours as needed (Anxiety, Nausea/Vomiting or Sleep)     Multiple myeloma not having achieved remission (H)       nitroGLYcerin 0.4 MG sublingual tablet    NITROSTAT    25 tablet    For chest pain place 1 tablet under the tongue every 5 minutes for 3 doses. If symptoms persist 5 minutes after 1st dose call 911.    Atypical chest pain       * order for DME     1 Units    Dispense one 4 wheeled walker with hand brakes and seat    Multiple myeloma not having achieved remission (H)       * order for DME     1 Units    Wheelchair.    Need for assistance due to unsteady gait       prochlorperazine 10 MG tablet    COMPAZINE    30 tablet    Take 1 tablet (10 mg) by mouth every 6 hours as needed (Nausea/Vomiting)    Multiple myeloma not having achieved remission (H)       tolterodine 4 MG 24 hr capsule    DETROL LA    90 capsule    Take 1 capsule (4 mg) by mouth daily    Urinary frequency       * Notice:  This list has 6 medication(s) that are the same as other medications prescribed for you. Read the directions carefully, and ask your doctor or other care provider to review them with you.

## 2018-03-27 LAB
ALBUMIN SERPL ELPH-MCNC: 3.7 G/DL (ref 3.7–5.1)
ALPHA1 GLOB SERPL ELPH-MCNC: 0.4 G/DL (ref 0.2–0.4)
ALPHA2 GLOB SERPL ELPH-MCNC: 0.8 G/DL (ref 0.5–0.9)
B-GLOBULIN SERPL ELPH-MCNC: 0.6 G/DL (ref 0.6–1)
GAMMA GLOB SERPL ELPH-MCNC: 0.5 G/DL (ref 0.7–1.6)
IGG SERPL-MCNC: 556 MG/DL (ref 695–1620)
KAPPA LC UR-MCNC: 2.23 MG/DL (ref 0.33–1.94)
KAPPA LC/LAMBDA SER: 3.84 {RATIO} (ref 0.26–1.65)
LAMBDA LC SERPL-MCNC: 0.58 MG/DL (ref 0.57–2.63)
M PROTEIN SERPL ELPH-MCNC: 0.3 G/DL
PROT PATTERN SERPL ELPH-IMP: ABNORMAL

## 2018-04-09 ENCOUNTER — ONCOLOGY VISIT (OUTPATIENT)
Dept: ONCOLOGY | Facility: CLINIC | Age: 83
End: 2018-04-09
Attending: INTERNAL MEDICINE
Payer: MEDICARE

## 2018-04-09 ENCOUNTER — HOSPITAL ENCOUNTER (OUTPATIENT)
Facility: CLINIC | Age: 83
Setting detail: SPECIMEN
Discharge: HOME | End: 2018-04-09
Attending: INTERNAL MEDICINE | Admitting: INTERNAL MEDICINE
Payer: MEDICARE

## 2018-04-09 ENCOUNTER — INFUSION THERAPY VISIT (OUTPATIENT)
Dept: INFUSION THERAPY | Facility: CLINIC | Age: 83
End: 2018-04-09
Attending: INTERNAL MEDICINE
Payer: MEDICARE

## 2018-04-09 VITALS
HEIGHT: 62 IN | RESPIRATION RATE: 18 BRPM | OXYGEN SATURATION: 92 % | SYSTOLIC BLOOD PRESSURE: 112 MMHG | HEART RATE: 62 BPM | BODY MASS INDEX: 28.52 KG/M2 | TEMPERATURE: 98.7 F | DIASTOLIC BLOOD PRESSURE: 58 MMHG | WEIGHT: 155 LBS

## 2018-04-09 VITALS
HEART RATE: 91 BPM | OXYGEN SATURATION: 92 % | RESPIRATION RATE: 18 BRPM | SYSTOLIC BLOOD PRESSURE: 122 MMHG | TEMPERATURE: 98.7 F | HEIGHT: 62 IN | BODY MASS INDEX: 28.52 KG/M2 | WEIGHT: 155 LBS | DIASTOLIC BLOOD PRESSURE: 95 MMHG

## 2018-04-09 DIAGNOSIS — C90.00 MULTIPLE MYELOMA NOT HAVING ACHIEVED REMISSION (H): Primary | ICD-10-CM

## 2018-04-09 LAB
ALBUMIN SERPL-MCNC: 3.6 G/DL (ref 3.4–5)
BASOPHILS # BLD AUTO: 0 10E9/L (ref 0–0.2)
BASOPHILS NFR BLD AUTO: 0.7 %
CALCIUM SERPL-MCNC: 8.6 MG/DL (ref 8.5–10.1)
CREAT SERPL-MCNC: 0.61 MG/DL (ref 0.52–1.04)
DIFFERENTIAL METHOD BLD: NORMAL
EOSINOPHIL # BLD AUTO: 0.1 10E9/L (ref 0–0.7)
EOSINOPHIL NFR BLD AUTO: 1.7 %
ERYTHROCYTE [DISTWIDTH] IN BLOOD BY AUTOMATED COUNT: 14.1 % (ref 10–15)
GFR SERPL CREATININE-BSD FRML MDRD: >90 ML/MIN/1.7M2
HCT VFR BLD AUTO: 35.9 % (ref 35–47)
HGB BLD-MCNC: 12.2 G/DL (ref 11.7–15.7)
IMM GRANULOCYTES # BLD: 0 10E9/L (ref 0–0.4)
IMM GRANULOCYTES NFR BLD: 0.2 %
LYMPHOCYTES # BLD AUTO: 1.1 10E9/L (ref 0.8–5.3)
LYMPHOCYTES NFR BLD AUTO: 19.4 %
MCH RBC QN AUTO: 30 PG (ref 26.5–33)
MCHC RBC AUTO-ENTMCNC: 34 G/DL (ref 31.5–36.5)
MCV RBC AUTO: 88 FL (ref 78–100)
MONOCYTES # BLD AUTO: 1.2 10E9/L (ref 0–1.3)
MONOCYTES NFR BLD AUTO: 19.8 %
NEUTROPHILS # BLD AUTO: 3.4 10E9/L (ref 1.6–8.3)
NEUTROPHILS NFR BLD AUTO: 58.2 %
NRBC # BLD AUTO: 0 10*3/UL
NRBC BLD AUTO-RTO: 0 /100
PLATELET # BLD AUTO: 340 10E9/L (ref 150–450)
RBC # BLD AUTO: 4.07 10E12/L (ref 3.8–5.2)
WBC # BLD AUTO: 5.9 10E9/L (ref 4–11)

## 2018-04-09 PROCEDURE — G0463 HOSPITAL OUTPT CLINIC VISIT: HCPCS | Mod: 25

## 2018-04-09 PROCEDURE — 82310 ASSAY OF CALCIUM: CPT | Performed by: INTERNAL MEDICINE

## 2018-04-09 PROCEDURE — 96401 CHEMO ANTI-NEOPL SQ/IM: CPT

## 2018-04-09 PROCEDURE — 85025 COMPLETE CBC W/AUTO DIFF WBC: CPT | Performed by: INTERNAL MEDICINE

## 2018-04-09 PROCEDURE — 96374 THER/PROPH/DIAG INJ IV PUSH: CPT

## 2018-04-09 PROCEDURE — 99214 OFFICE O/P EST MOD 30 MIN: CPT | Performed by: INTERNAL MEDICINE

## 2018-04-09 PROCEDURE — 82040 ASSAY OF SERUM ALBUMIN: CPT | Performed by: INTERNAL MEDICINE

## 2018-04-09 PROCEDURE — 25000128 H RX IP 250 OP 636: Mod: JW | Performed by: INTERNAL MEDICINE

## 2018-04-09 PROCEDURE — 82565 ASSAY OF CREATININE: CPT | Performed by: INTERNAL MEDICINE

## 2018-04-09 PROCEDURE — 96375 TX/PRO/DX INJ NEW DRUG ADDON: CPT

## 2018-04-09 RX ORDER — ZOLEDRONIC ACID 0.04 MG/ML
4 INJECTION, SOLUTION INTRAVENOUS ONCE
Status: CANCELLED | OUTPATIENT
Start: 2018-04-09 | End: 2018-04-09

## 2018-04-09 RX ORDER — ZOLEDRONIC ACID 0.04 MG/ML
4 INJECTION, SOLUTION INTRAVENOUS ONCE
Status: COMPLETED | OUTPATIENT
Start: 2018-04-09 | End: 2018-04-09

## 2018-04-09 RX ADMIN — ZOLEDRONIC ACID 4 MG: 0.04 INJECTION, SOLUTION INTRAVENOUS at 14:21

## 2018-04-09 RX ADMIN — BORTEZOMIB 2.7 MG: 3.5 INJECTION, POWDER, LYOPHILIZED, FOR SOLUTION INTRAVENOUS; SUBCUTANEOUS at 14:25

## 2018-04-09 ASSESSMENT — PAIN SCALES - GENERAL
PAINLEVEL: NO PAIN (0)
PAINLEVEL: NO PAIN (0)

## 2018-04-09 NOTE — MR AVS SNAPSHOT
After Visit Summary   4/9/2018    Quiana Dunaway    MRN: [id number]           Patient Information     Date Of Birth          1935        Visit Information        Provider Department      4/9/2018 1:00 PM SH INFUSION CHAIR 20 [facility]        Today's Diagnoses     Multiple myeloma not having achieved remission (H)    -  1       Follow-ups after your visit        Your next 10 appointments already scheduled     Apr 16, 2018  2:00 PM CDT   Level 2 with SH INFUSION CHAIR 10   [facility] ([facility])    [facility] [place] [address]  [place] [address]   [phone]            Apr 17, 2018  2:15 PM CDT   [facility] EP RETURN with Alberto Worthington MD   [facility]    [address]  [place] [address]   [phone] OPT 2            Apr 23, 2018  2:00 PM CDT   Level 2 with  INFUSION CHAIR 12   [facility] ([facility])    [facility]  [address]  [place] [address]   [phone]            Apr 30, 2018  2:00 PM CDT   Level 2 with  INFUSION CHAIR 10   [facility] ([facility])    [facility]  [address]  [place] [address]   [phone]            May 07, 2018  1:40 PM CDT   Return Visit with Alex Parry MD   [facility] ([facility])    [facility]  [address]  [place] [address]   [phone]            May 07, 2018  2:00 PM CDT   Level 2 with SH INFUSION CHAIR 7   [facility] ([facility])    [facility]  [address]  [place] [address]   [phone]            May 14, 2018  1:00 PM CDT   Return  Visit with Leona Rodriguez PA-C   [facility] [place] ([facility] [place])    [address]  [place] [address] [phone]            May 14, 2018  2:00 PM CDT   Level 2 with SH INFUSION CHAIR 8   [facility] ([facility])    [facility] [place]  [address]  [place] [address] [phone]            May 21, 2018  2:00 PM CDT   Level 2 with SH INFUSION CHAIR 1   [facility] ([facility])    [facility]  [address]  [place] [address] [phone]            May 29, 2018  2:00 PM CDT   Level 2 with SH INFUSION CHAIR 9   [facility] ([facility])    [facility]  [address]  [place] [address] [phone]              Who to contact     If you have questions or need follow up information about today's clinic visit or your schedule please contact [facility] directly at [phone].  Normal or non-critical lab and imaging results will be communicated to you by RealMassivehart, letter or phone within 4 business days after the clinic has received the results. If you do not hear from us within 7 days, please contact the clinic through RealMassivehart or phone. If you have a critical or abnormal lab result, we will notify you by phone as soon as possible.  Submit refill requests through ISVWorld or call your pharmacy and they will forward the refill request to us. Please allow 3 business days for your refill to be completed.          Additional Information About Your Visit        ISVWorld Information     ISVWorld gives you secure access to your electronic health record. If you see a primary care provider, you can also send messages to your care team and make appointments. If you have questions, please call  "your primary care clinic.  If you do not have a primary care provider, please call [phone] and they will assist you.        Care EveryWhere ID     This is your Care EveryWhere ID. This could be used by other organizations to access your [place] medical records  [id number]        Your Vitals Were     Pulse Temperature Respirations Height Pulse Oximetry BMI (Body Mass Index)    62 98.7  F (37.1  C) (Oral) 18 1.575 m (5' 2\") 92% 28.35 kg/m2       Blood Pressure from Last 3 Encounters:   04/09/18 (!) 122/95   04/09/18 112/58   03/26/18 142/80    Weight from Last 3 Encounters:   04/09/18 70.3 kg (155 lb)   04/09/18 70.3 kg (155 lb)   03/26/18 72.8 kg (160 lb 6.4 oz)              We Performed the Following     Albumin level     Calcium     CBC with platelets differential     Creatinine        Primary Care Provider Office Phone # Fax #    César Chung [phone]       [facility] [address] [place] [address] [address]  [place] [address]        Equal Access to Services     [facility]: Hadii aad ku hadasho Soomaali, waaxda luqadaha, qaybta kaalmada adeegyada, phoenix gaines haymarnien carlos rodriguez . So [facility] [phone].    ATENCIÓN: Si habla español, tiene a contreras disposición servicios gratuitos de asistencia lingüística. Llame al [phone].    We comply with applicable federal civil rights laws and [place] laws. We do not discriminate on the basis of race, color, national origin, age, disability, sex, sexual orientation, or gender identity.            Thank you!     Thank you for choosing [facility]  for your care. Our goal is always to provide you with excellent care. Hearing back from our patients is one way we can continue to improve our services. Please take a few minutes to complete the written survey that you may receive in the mail after your visit with us. Thank you!             Your Updated Medication List - Protect others around you: Learn how to " safely use, store and throw away your medicines at www.disposemymeds.org.          This list is accurate as of 4/9/18  2:41 PM.  Always use your most recent med list.                   Brand Name Dispense Instructions for use Diagnosis    acyclovir 400 MG tablet    ZOVIRAX    60 tablet    Take 1 tablet (400 mg) by mouth 2 times daily Viral Prophylaxis.    Multiple myeloma not having achieved remission (H)       aspirin 81 MG chewable tablet      Take 81 mg by mouth daily        calcium carbonate-vitamin D 600-400 MG-UNIT Chew     180 tablet    Take 1 chew tab by mouth 2 times daily    Multiple myeloma not having achieved remission (H)       dexamethasone 4 MG tablet    DECADRON    30 tablet    Take 10 tablets (40 mg) by mouth every 7 days Days 1, 8, and 15.    Multiple myeloma not having achieved remission (H)       escitalopram 5 MG tablet    LEXAPRO    90 tablet    TAKE 1 TABLET BY MOUTH DAILY    ROBER (generalized anxiety disorder)       IMODIUM A-D PO      Take by mouth as needed        LENalidomide 10 MG Caps capsule CHEMO    REVLIMID    14 capsule    Take 1 capsule (10 mg) by mouth daily for 14 days Days 1 through 14.    Multiple myeloma not having achieved remission (H)       lisinopril 10 MG tablet    PRINIVIL/ZESTRIL    90 tablet    Take 1 tablet (10 mg) by mouth daily    Benign essential hypertension       LORazepam 0.5 MG tablet    ATIVAN    10 tablet    Take 1 tablet (0.5 mg) by mouth every 8 hours as needed (Anxiety, Nausea/Vomiting or Sleep)    Multiple myeloma not having achieved remission (H)       nitroGLYcerin 0.4 MG sublingual tablet    NITROSTAT    25 tablet    For chest pain place 1 tablet under the tongue every 5 minutes for 3 doses. If symptoms persist 5 minutes after 1st dose call 911.    Atypical chest pain       * order for DME     1 Units    Dispense one 4 wheeled walker with hand brakes and seat    Multiple myeloma not having achieved remission (H)       * order for DME     1 Units     Wheelchair.    Need for assistance due to unsteady gait       prochlorperazine 10 MG tablet    COMPAZINE    30 tablet    Take 1 tablet (10 mg) by mouth every 6 hours as needed (Nausea/Vomiting)    Multiple myeloma not having achieved remission (H)       tolterodine 4 MG 24 hr capsule    DETROL LA    90 capsule    Take 1 capsule (4 mg) by mouth daily    Urinary frequency       * Notice:  This list has 2 medication(s) that are the same as other medications prescribed for you. Read the directions carefully, and ask your doctor or other care provider to review them with you.

## 2018-04-09 NOTE — Clinical Note
"    4/9/2018         RE: Quiana Dunaway  [address] [facility] [address]        Dear Colleague,    Thank you for referring your patient, Quiana Dunaway, to the [facility]. Please see a copy of my visit note below.    Oncology Rooming Note    April 9, 2018 1:19 PM   Quiana Dunaway is a 82 year old female who presents for:    Chief Complaint   Patient presents with     Oncology Clinic Visit     Initial Vitals: BP (!) 122/95  Pulse 91  Temp 98.7  F (37.1  C) (Oral)  Resp 18  Ht 1.575 m (5' 2.01\")  Wt 70.3 kg (155 lb)  SpO2 92%  BMI 28.34 kg/m2 Estimated body mass index is 28.34 kg/(m^2) as calculated from the following:    Height as of this encounter: 1.575 m (5' 2.01\").    Weight as of this encounter: 70.3 kg (155 lb). Body surface area is 1.75 meters squared.  No Pain (0) Comment: Data Unavailable   No LMP recorded. Patient is postmenopausal.  Allergies reviewed: Yes  Medications reviewed: Yes    Medications: MEDICATION REFILLS NEEDED TODAY. Provider was notified. REVLIMID REFILL NEEDED TODAY.  Pharmacy name entered into [facility]:    [facility] - [place], [place] - [address] [facility] [address]  [facility] [place], [place] - [address]  [facility] - [place], [place] - [address]    Clinical concerns: None     3 minutes for nursing intake (face to face time)     Bonny Sharif CMA                Visit Date:   04/09/2018     HEMATOLOGY HISTORY: Mrs. Dunaway is a lady with multiple myeloma (IgG kappa). High risk, t(14;16).  1. On 05/01/2017, WBC of 3.4, hemoglobin of 10.2 and platelet of 154.  2. SPEP on 07/07/2017 revealed M-spike of 1.8.  3. Bone marrow biopsy on 07/12/2017 reveals hypercellular bone marrow with kappa monotypic plasma cell population consistent with multiple myeloma.  Bone marrow is 70% cellular.  Plasma cell is 50-60%.     -There is gain of chromosome 1, 5, 9, 15, and 17.  There is " translocation 14;16.   4.  On 07/18/2017:  -M-spike of 1.9.   -Immunofixation reveals monoclonal IgG kappa.    -IgG level of 2970.  IgA of 15 and IgM of 175.    -Kappa free light chain of 67.7.  Lambda free light chain of 1.42.  Ratio of kappa to lambda of 47.71.    -Beta 2 microglobulin of 3.4.   5. PET scan on 07/24/2017 reveals several focal areas of increased FDG uptake consistent with multiple myeloma.  There is probable epithelial cyst in tail of the pancreas.  No associated abnormal FDG activity.  Left thyroid cysts or nodules without any FDG activity.  There is a 0.3 cm left upper lobe lung nodule.   8.  Velcade, Revlimid and dexamethasone started on 08/14/2017.  9. On 10/02/2017 (after 2 cycles):  -M-spike of 0.5  -Kappa free light chain of 4.3  -IgG of 717.      SUBJECTIVE:    Ms. Dunaway is an 82-year-old female with high risk IGG multiple myeloma.  She is on Velcade, Revlimid and dexamethasone since 8/2017.  Her disease is responding.  Labs on 03/26/2018 reveals improvement in kappa free light chain to 2.23.      Overall, patient's condition is stable.  She has fatigue.  No headache.  No dizziness.  Food does not taste good.  No chest pain or difficulty breathing.  No nausea or vomiting.  Appetite has been decreased as food does not taste good.  No urinary or bowel complaints.  No bleeding.  No fever or chills.  She has some neuropathy.  She has some numbness in the fingers and toes.  As per the patient, they are not getting bad.  Things are not falling out of her hand.  Walking is pretty good.      PHYSICAL EXAMINATION:    Alert and oriented × 3.  Eyes: No icterus.    Throat: No ulcer.    Neck: No lymphadenopathy.  Nontender.  Axilla: No lymphadenopathy.  Lungs: Good air entry bilaterally.  Heart: Regular.  Abdomen: Soft.  Nondistended.  Nontender.  No masses felt.  Extremities: Mild edema.  Skin: No petechiae.      LABORATORY DATA:  Reviewed.      ASSESSMENT:   1.  An 82-year-old female with high-risk  multiple myeloma.  She is responding to Velcade, Revlimid and dexamethasone.   2.  Peripheral neuropathy.   3.  Fatigue.   4.  Decreased appetite.      PLAN:   1.  Labs were reviewed.  She was happy to know that her myeloma is improving.      She is on Revlimid, Velcade and dexamethasone. I told the patient that we will complete 12 cycles.  After that, I would recommend stopping Velcade and continuing the Revlimid and dexamethasone.  She is agreeable for it.  She will get her treatment today.  Side effects reviewed.      2.  Discussed regarding neuropathy.  I told her that it will get worse with continuing Velcade.  We will plan on discontinuing Velcade after 12 cycles and neuropathy should improve.     3.  She has fatigue. This is due to her age, myeloma and chemotherapy.  I am hoping it will improve some once we stop the Velcade.       4.  She had a few questions, which were all answered.  I will see her in a month for followup.  Advised her to return sooner if she has fever, chills, recurrent vomiting, bleeding, worsening weakness or any other concerns.        5. Patient is on Zometa for myeloma.  She will continue on it.  She is not having any dental or jaw related symptoms.  Nothing to suggest any osteonecrosis of the jaw.         YVES PEREZ MD             D: 2018   T: 04/10/2018   MT: GAEL      Name:     DUGLAS CASTILLO   MRN:      [id number]        Account:      [id number]   :      1935           Visit Date:   2018      Document: [id number]        Again, thank you for allowing me to participate in the care of your patient.        Sincerely,        Yves Perez MD

## 2018-04-09 NOTE — MR AVS SNAPSHOT
After Visit Summary   4/9/2018    Quiana Dunaway    MRN: [id number]           Patient Information     Date Of Birth          1935        Visit Information        Provider Department      4/9/2018 1:30 PM Alex Parry MD [facility]        Today's Diagnoses     Multiple myeloma not having achieved remission (H)    -  1      Care Instructions    Continue treatment.  Follow up in 1 month.  Recheck BP.          Follow-ups after your visit        Your next 10 appointments already scheduled     Apr 17, 2018  2:15 PM CDT   P EP RETURN with Alberto Worthington MD   [facility] ([facility])    [address]  [place] [address]   [phone] OPT 2            Apr 23, 2018  2:00 PM CDT   Level 2 with SH INFUSION CHAIR 12   [facility] ([facility])    [facility]  [address]  [place] [address]   [phone]            Apr 30, 2018  2:00 PM CDT   Level 2 with SH INFUSION CHAIR 10   [facility] ([facility])    [facility] [place]  [address]  [place] [address] [phone]            May 07, 2018  1:40 PM CDT   Return Visit with Alex Parry MD   [facility]    [facility] [place]  [address]  [place] [address] [phone]            May 07, 2018  2:00 PM CDT   Level 2 with SH INFUSION CHAIR 7   [facility] ([facility])    [facility] [place]  [address]  [place] [address] [phone]            May 14, 2018  1:00 PM CDT   Return Visit with Leona Rodriguez PA-C   [facility] [place] ([facility] [place])    [address]  [address]  [place] [address] [phone]             May 14, 2018  2:00 PM CDT   Level 2 with SH INFUSION CHAIR 8   [facility] ([facility])    [facility] [place]  [address]  [place] [address]   [phone]            May 21, 2018  2:00 PM CDT   Level 2 with SH INFUSION CHAIR 1   [facility] ([facility])    [facility] [place] [address]  [place] [address]   [phone]            May 29, 2018  2:00 PM CDT   Level 2 with SH INFUSION CHAIR 9   [facility] ([facility])    [facility] [place] [address]  [place] [address]   [phone]            Jun 04, 2018  2:00 PM CDT   Level 2 with SH INFUSION CHAIR 11   [facility] ([facility])    [facility] [place] [address]  [place] [address]   [phone]              Future tests that were ordered for you today     Open Future Orders        Priority Expected Expires Ordered    Follow-Up with Device Clinic Routine 7/16/2018 4/16/2019 4/16/2018            Who to contact     If you have questions or need follow up information about today's clinic visit or your schedule please contact [facility] directly at [phone].  Normal or non-critical lab and imaging results will be communicated to you by Prestaderohart, letter or phone within 4 business days after the clinic has received the results. If you do not hear from us within 7 days, please contact the clinic through Prestaderohart or phone. If you have a critical or abnormal lab result, we will notify you by phone as soon as possible.  Submit refill requests through mEgo or call your pharmacy and they will forward the refill request to us. Please allow 3 business days for your refill to be completed.          Additional Information About Your Visit       "  MyChart Information     OANDAt gives you secure access to your electronic health record. If you see a primary care provider, you can also send messages to your care team and make appointments. If you have questions, please call your primary care clinic.  If you do not have a primary care provider, please call [phone] and they will assist you.        Care EveryWhere ID     This is your Care EveryWhere ID. This could be used by other organizations to access your [place] medical records  [id number]        Your Vitals Were     Pulse Temperature Respirations Height Pulse Oximetry BMI (Body Mass Index)    91 98.7  F (37.1  C) (Oral) 18 1.575 m (5' 2.01\") 92% 28.34 kg/m2       Blood Pressure from Last 3 Encounters:   No data found for BP    Weight from Last 3 Encounters:   No data found for Wt              Today, you had the following     No orders found for display       Primary Care Provider Office Phone # Fax #    César GISELE Chung [phone]       [facility] [address] [facility] [address]  [place] [address]        Equal Access to Services     [facility]: Hadii aad ku hadasho Soomaali, waaxda luqadaha, qaybta kaalmada adeegyada, phoenix rodriguez . So [facility] [phone].    ATENCIÓN: Si habla español, tiene a contreras disposición servicios gratuitos de asistencia lingüística. Chad al [phone].    We comply with applicable federal civil rights laws and [place] laws. We do not discriminate on the basis of race, color, national origin, age, disability, sex, sexual orientation, or gender identity.            Thank you!     Thank you for choosing [facility]  for your care. Our goal is always to provide you with excellent care. Hearing back from our patients is one way we can continue to improve our services. Please take a few minutes to complete the written survey that you may receive in the mail after your visit with us. Thank you!             Your " Updated Medication List - Protect others around you: Learn how to safely use, store and throw away your medicines at www.disposemymeds.org.          This list is accurate as of 4/9/18 11:59 PM.  Always use your most recent med list.                   Brand Name Dispense Instructions for use Diagnosis    acyclovir 400 MG tablet    ZOVIRAX    60 tablet    Take 1 tablet (400 mg) by mouth 2 times daily Viral Prophylaxis.    Multiple myeloma not having achieved remission (H)       aspirin 81 MG chewable tablet      Take 81 mg by mouth daily        calcium carbonate-vitamin D 600-400 MG-UNIT Chew     180 tablet    Take 1 chew tab by mouth 2 times daily    Multiple myeloma not having achieved remission (H)       dexamethasone 4 MG tablet    DECADRON    30 tablet    Take 10 tablets (40 mg) by mouth every 7 days Days 1, 8, and 15.    Multiple myeloma not having achieved remission (H)       escitalopram 5 MG tablet    LEXAPRO    90 tablet    TAKE 1 TABLET BY MOUTH DAILY    ROBER (generalized anxiety disorder)       IMODIUM A-D PO      Take by mouth as needed        LENalidomide 10 MG Caps capsule CHEMO    REVLIMID    14 capsule    Take 1 capsule (10 mg) by mouth daily for 14 days Days 1 through 14.    Multiple myeloma not having achieved remission (H)       lisinopril 10 MG tablet    PRINIVIL/ZESTRIL    90 tablet    Take 1 tablet (10 mg) by mouth daily    Benign essential hypertension       nitroGLYcerin 0.4 MG sublingual tablet    NITROSTAT    25 tablet    For chest pain place 1 tablet under the tongue every 5 minutes for 3 doses. If symptoms persist 5 minutes after 1st dose call 911.    Atypical chest pain       * order for DME     1 Units    Dispense one 4 wheeled walker with hand brakes and seat    Multiple myeloma not having achieved remission (H)       * order for DME     1 Units    Wheelchair.    Need for assistance due to unsteady gait       prochlorperazine 10 MG tablet    COMPAZINE    30 tablet    Take 1 tablet (10  mg) by mouth every 6 hours as needed (Nausea/Vomiting)    Multiple myeloma not having achieved remission (H)       tolterodine 4 MG 24 hr capsule    DETROL LA    90 capsule    Take 1 capsule (4 mg) by mouth daily    Urinary frequency       * Notice:  This list has 2 medication(s) that are the same as other medications prescribed for you. Read the directions carefully, and ask your doctor or other care provider to review them with you.

## 2018-04-09 NOTE — PROGRESS NOTES
Infusion Nursing Note:  Quiana Dunaway presents today for C11D8 velcade, zometa.    Patient seen by provider today: Yes: Dr. Parry   present during visit today: Not Applicable.    Note: N/A.    Intravenous Access:  Labs drawn without difficulty.  Peripheral IV placed.    Treatment Conditions:  Lab Results   Component Value Date    HGB 12.2 04/09/2018     Lab Results   Component Value Date    WBC 5.9 04/09/2018      Lab Results   Component Value Date    ANEU 3.4 04/09/2018     Lab Results   Component Value Date     04/09/2018      Lab Results   Component Value Date     03/26/2018                   Lab Results   Component Value Date    POTASSIUM 3.4 03/26/2018           No results found for: MAG         Lab Results   Component Value Date    CR 0.61 04/09/2018                   Lab Results   Component Value Date    HUMBERTO 8.6 04/09/2018                Lab Results   Component Value Date    BILITOTAL 0.5 03/26/2018           Lab Results   Component Value Date    ALBUMIN 3.6 04/09/2018                    Lab Results   Component Value Date    ALT 17 03/26/2018           Lab Results   Component Value Date    AST 16 03/26/2018       Results reviewed, labs MET treatment parameters, ok to proceed with treatment.      Post Infusion Assessment:  Patient tolerated infusion without incident.  Patient tolerated injection without incident.  Blood return noted pre and post infusion.  Site patent and intact, free from redness, edema or discomfort.  No evidence of extravasations.  Access discontinued per protocol.    Discharge Plan:   Patient declined prescription refills.  Discharge instructions reviewed with: Patient.  Patient and/or family verbalized understanding of discharge instructions and all questions answered.  AVS to patient via [employer].  Patient will return 4/ for next appointment.   Patient discharged in stable condition accompanied by: self and daughter-in-law.  Departure Mode:  Ambulatory.    Lalita Palacios RN

## 2018-04-09 NOTE — PROGRESS NOTES
"Oncology Rooming Note    April 9, 2018 1:19 PM   Quiana Dunaway is a 82 year old female who presents for:    Chief Complaint   Patient presents with     Oncology Clinic Visit     Initial Vitals: BP (!) 122/95  Pulse 91  Temp 98.7  F (37.1  C) (Oral)  Resp 18  Ht 1.575 m (5' 2.01\")  Wt 70.3 kg (155 lb)  SpO2 92%  BMI 28.34 kg/m2 Estimated body mass index is 28.34 kg/(m^2) as calculated from the following:    Height as of this encounter: 1.575 m (5' 2.01\").    Weight as of this encounter: 70.3 kg (155 lb). Body surface area is 1.75 meters squared.  No Pain (0) Comment: Data Unavailable   No LMP recorded. Patient is postmenopausal.  Allergies reviewed: Yes  Medications reviewed: Yes    Medications: MEDICATION REFILLS NEEDED TODAY. Provider was notified. REVLIMID REFILL NEEDED TODAY.  Pharmacy name entered into [facility]:    [facility] - [place], [place] - [facility] [address] AT [facility] [address]  [facility] - [place], [place] - [address]  [facility] - [place], [place] - [address]    Clinical concerns: None     3 minutes for nursing intake (face to face time)     Bonny Sharif CMA              "

## 2018-04-10 DIAGNOSIS — C90.00 MULTIPLE MYELOMA NOT HAVING ACHIEVED REMISSION (H): Primary | ICD-10-CM

## 2018-04-10 RX ORDER — METHYLPREDNISOLONE SODIUM SUCCINATE 125 MG/2ML
125 INJECTION, POWDER, LYOPHILIZED, FOR SOLUTION INTRAMUSCULAR; INTRAVENOUS
Status: CANCELLED
Start: 2018-04-23

## 2018-04-10 RX ORDER — LORAZEPAM 2 MG/ML
0.5 INJECTION INTRAMUSCULAR EVERY 4 HOURS PRN
Status: CANCELLED
Start: 2018-04-30

## 2018-04-10 RX ORDER — EPINEPHRINE 0.3 MG/.3ML
0.3 INJECTION SUBCUTANEOUS EVERY 5 MIN PRN
Status: CANCELLED | OUTPATIENT
Start: 2018-04-16

## 2018-04-10 RX ORDER — DIPHENHYDRAMINE HYDROCHLORIDE 50 MG/ML
50 INJECTION INTRAMUSCULAR; INTRAVENOUS
Status: CANCELLED
Start: 2018-04-23

## 2018-04-10 RX ORDER — DIPHENHYDRAMINE HYDROCHLORIDE 50 MG/ML
50 INJECTION INTRAMUSCULAR; INTRAVENOUS
Status: CANCELLED
Start: 2018-04-30

## 2018-04-10 RX ORDER — EPINEPHRINE 0.3 MG/.3ML
0.3 INJECTION SUBCUTANEOUS EVERY 5 MIN PRN
Status: CANCELLED | OUTPATIENT
Start: 2018-04-23

## 2018-04-10 RX ORDER — EPINEPHRINE 1 MG/ML
0.3 INJECTION, SOLUTION INTRAMUSCULAR; SUBCUTANEOUS EVERY 5 MIN PRN
Status: CANCELLED | OUTPATIENT
Start: 2018-04-30

## 2018-04-10 RX ORDER — MEPERIDINE HYDROCHLORIDE 25 MG/ML
25 INJECTION INTRAMUSCULAR; INTRAVENOUS; SUBCUTANEOUS EVERY 30 MIN PRN
Status: CANCELLED | OUTPATIENT
Start: 2018-04-23

## 2018-04-10 RX ORDER — ALBUTEROL SULFATE 0.83 MG/ML
2.5 SOLUTION RESPIRATORY (INHALATION)
Status: CANCELLED | OUTPATIENT
Start: 2018-04-30

## 2018-04-10 RX ORDER — ALBUTEROL SULFATE 90 UG/1
1-2 AEROSOL, METERED RESPIRATORY (INHALATION)
Status: CANCELLED
Start: 2018-04-30

## 2018-04-10 RX ORDER — ALBUTEROL SULFATE 0.83 MG/ML
2.5 SOLUTION RESPIRATORY (INHALATION)
Status: CANCELLED | OUTPATIENT
Start: 2018-04-16

## 2018-04-10 RX ORDER — SODIUM CHLORIDE 9 MG/ML
1000 INJECTION, SOLUTION INTRAVENOUS CONTINUOUS PRN
Status: CANCELLED
Start: 2018-04-16

## 2018-04-10 RX ORDER — EPINEPHRINE 1 MG/ML
0.3 INJECTION, SOLUTION INTRAMUSCULAR; SUBCUTANEOUS EVERY 5 MIN PRN
Status: CANCELLED | OUTPATIENT
Start: 2018-04-23

## 2018-04-10 RX ORDER — LENALIDOMIDE 10 MG/1
10 CAPSULE ORAL DAILY
Qty: 14 CAPSULE | Refills: 0 | Status: SHIPPED | OUTPATIENT
Start: 2018-04-10 | End: 2018-12-26

## 2018-04-10 RX ORDER — MEPERIDINE HYDROCHLORIDE 25 MG/ML
25 INJECTION INTRAMUSCULAR; INTRAVENOUS; SUBCUTANEOUS EVERY 30 MIN PRN
Status: CANCELLED | OUTPATIENT
Start: 2018-04-30

## 2018-04-10 RX ORDER — DIPHENHYDRAMINE HYDROCHLORIDE 50 MG/ML
50 INJECTION INTRAMUSCULAR; INTRAVENOUS
Status: CANCELLED
Start: 2018-04-16

## 2018-04-10 RX ORDER — MEPERIDINE HYDROCHLORIDE 25 MG/ML
25 INJECTION INTRAMUSCULAR; INTRAVENOUS; SUBCUTANEOUS EVERY 30 MIN PRN
Status: CANCELLED | OUTPATIENT
Start: 2018-04-16

## 2018-04-10 RX ORDER — METHYLPREDNISOLONE SODIUM SUCCINATE 125 MG/2ML
125 INJECTION, POWDER, LYOPHILIZED, FOR SOLUTION INTRAMUSCULAR; INTRAVENOUS
Status: CANCELLED
Start: 2018-04-16

## 2018-04-10 RX ORDER — LORAZEPAM 2 MG/ML
0.5 INJECTION INTRAMUSCULAR EVERY 4 HOURS PRN
Status: CANCELLED
Start: 2018-04-23

## 2018-04-10 RX ORDER — ALBUTEROL SULFATE 0.83 MG/ML
2.5 SOLUTION RESPIRATORY (INHALATION)
Status: CANCELLED | OUTPATIENT
Start: 2018-04-23

## 2018-04-10 RX ORDER — EPINEPHRINE 0.3 MG/.3ML
0.3 INJECTION SUBCUTANEOUS EVERY 5 MIN PRN
Status: CANCELLED | OUTPATIENT
Start: 2018-04-30

## 2018-04-10 RX ORDER — METHYLPREDNISOLONE SODIUM SUCCINATE 125 MG/2ML
125 INJECTION, POWDER, LYOPHILIZED, FOR SOLUTION INTRAMUSCULAR; INTRAVENOUS
Status: CANCELLED
Start: 2018-04-30

## 2018-04-10 RX ORDER — LORAZEPAM 2 MG/ML
0.5 INJECTION INTRAMUSCULAR EVERY 4 HOURS PRN
Status: CANCELLED
Start: 2018-04-16

## 2018-04-10 RX ORDER — ALBUTEROL SULFATE 90 UG/1
1-2 AEROSOL, METERED RESPIRATORY (INHALATION)
Status: CANCELLED
Start: 2018-04-16

## 2018-04-10 RX ORDER — SODIUM CHLORIDE 9 MG/ML
1000 INJECTION, SOLUTION INTRAVENOUS CONTINUOUS PRN
Status: CANCELLED
Start: 2018-04-23

## 2018-04-10 RX ORDER — SODIUM CHLORIDE 9 MG/ML
1000 INJECTION, SOLUTION INTRAVENOUS CONTINUOUS PRN
Status: CANCELLED
Start: 2018-04-30

## 2018-04-10 RX ORDER — DEXAMETHASONE 4 MG/1
40 TABLET ORAL
Qty: 30 TABLET | Refills: 0 | Status: SHIPPED | OUTPATIENT
Start: 2018-04-10 | End: 2018-04-25

## 2018-04-10 RX ORDER — EPINEPHRINE 1 MG/ML
0.3 INJECTION, SOLUTION INTRAMUSCULAR; SUBCUTANEOUS EVERY 5 MIN PRN
Status: CANCELLED | OUTPATIENT
Start: 2018-04-16

## 2018-04-10 RX ORDER — ALBUTEROL SULFATE 90 UG/1
1-2 AEROSOL, METERED RESPIRATORY (INHALATION)
Status: CANCELLED
Start: 2018-04-23

## 2018-04-10 NOTE — PROGRESS NOTES
Visit Date:   04/09/2018     HEMATOLOGY HISTORY: Mrs. Dunaway is a lady with multiple myeloma (IgG kappa). High risk, t(14;16).  1. On 05/01/2017, WBC of 3.4, hemoglobin of 10.2 and platelet of 154.  2. SPEP on 07/07/2017 revealed M-spike of 1.8.  3. Bone marrow biopsy on 07/12/2017 reveals hypercellular bone marrow with kappa monotypic plasma cell population consistent with multiple myeloma.  Bone marrow is 70% cellular.  Plasma cell is 50-60%.     -There is gain of chromosome 1, 5, 9, 15, and 17.  There is translocation 14;16.   4.  On 07/18/2017:  -M-spike of 1.9.   -Immunofixation reveals monoclonal IgG kappa.    -IgG level of 2970.  IgA of 15 and IgM of 175.    -Kappa free light chain of 67.7.  Lambda free light chain of 1.42.  Ratio of kappa to lambda of 47.71.    -Beta 2 microglobulin of 3.4.   5. PET scan on 07/24/2017 reveals several focal areas of increased FDG uptake consistent with multiple myeloma.  There is probable epithelial cyst in tail of the pancreas.  No associated abnormal FDG activity.  Left thyroid cysts or nodules without any FDG activity.  There is a 0.3 cm left upper lobe lung nodule.   8.  Velcade, Revlimid and dexamethasone started on 08/14/2017.  9. On 10/02/2017 (after 2 cycles):  -M-spike of 0.5  -Kappa free light chain of 4.3  -IgG of 717.      SUBJECTIVE:    Ms. Dunaway is an 82-year-old female with high risk IGG multiple myeloma.  She is on Velcade, Revlimid and dexamethasone since 8/2017.  Her disease is responding.  Labs on 03/26/2018 reveals improvement in kappa free light chain to 2.23.      Overall, patient's condition is stable.  She has fatigue.  No headache.  No dizziness.  Food does not taste good.  No chest pain or difficulty breathing.  No nausea or vomiting.  Appetite has been decreased as food does not taste good.  No urinary or bowel complaints.  No bleeding.  No fever or chills.  She has some neuropathy.  She has some numbness in the fingers and toes.  As per the  patient, they are not getting bad.  Things are not falling out of her hand.  Walking is pretty good.      PHYSICAL EXAMINATION:    Alert and oriented × 3.  Eyes: No icterus.    Throat: No ulcer.    Neck: No lymphadenopathy.  Nontender.  Axilla: No lymphadenopathy.  Lungs: Good air entry bilaterally.  Heart: Regular.  Abdomen: Soft.  Nondistended.  Nontender.  No masses felt.  Extremities: Mild edema.  Skin: No petechiae.      LABORATORY DATA:  Reviewed.      ASSESSMENT:   1.  An 82-year-old female with high-risk multiple myeloma.  She is responding to Velcade, Revlimid and dexamethasone.   2.  Peripheral neuropathy.   3.  Fatigue.   4.  Decreased appetite.      PLAN:   1.  Labs were reviewed.  She was happy to know that her myeloma is improving.      She is on Revlimid, Velcade and dexamethasone. I told the patient that we will complete 12 cycles.  After that, I would recommend stopping Velcade and continuing the Revlimid and dexamethasone.  She is agreeable for it.  She will get her treatment today.  Side effects reviewed.      2.  Discussed regarding neuropathy.  I told her that it will get worse with continuing Velcade.  We will plan on discontinuing Velcade after 12 cycles and neuropathy should improve.     3.  She has fatigue. This is due to her age, myeloma and chemotherapy.  I am hoping it will improve some once we stop the Velcade.       4.  She had a few questions, which were all answered.  I will see her in a month for followup.  Advised her to return sooner if she has fever, chills, recurrent vomiting, bleeding, worsening weakness or any other concerns.        5. Patient is on Zometa for myeloma.  She will continue on it.  She is not having any dental or jaw related symptoms.  Nothing to suggest any osteonecrosis of the jaw.         YVES PEREZ MD             D: 2018   T: 04/10/2018   MT: GAEL      Name:     DUGLAS CASTILLO   MRN:      [id number]        Account:      [id number]   :       1935           Visit Date:   04/09/2018      Document: [id number]

## 2018-04-16 ENCOUNTER — DOCUMENTATION ONLY (OUTPATIENT)
Dept: PHARMACY | Facility: CLINIC | Age: 83
End: 2018-04-16

## 2018-04-16 ENCOUNTER — HOSPITAL ENCOUNTER (OUTPATIENT)
Facility: CLINIC | Age: 83
Setting detail: SPECIMEN
Discharge: HOME | End: 2018-04-16
Attending: INTERNAL MEDICINE | Admitting: INTERNAL MEDICINE
Payer: MEDICARE

## 2018-04-16 ENCOUNTER — INFUSION THERAPY VISIT (OUTPATIENT)
Dept: INFUSION THERAPY | Facility: CLINIC | Age: 83
End: 2018-04-16
Attending: INTERNAL MEDICINE
Payer: MEDICARE

## 2018-04-16 ENCOUNTER — ALLIED HEALTH/NURSE VISIT (OUTPATIENT)
Dept: CARDIOLOGY | Facility: CLINIC | Age: 83
End: 2018-04-16
Payer: MEDICARE

## 2018-04-16 VITALS
DIASTOLIC BLOOD PRESSURE: 64 MMHG | TEMPERATURE: 98.8 F | RESPIRATION RATE: 16 BRPM | OXYGEN SATURATION: 96 % | WEIGHT: 153.8 LBS | HEART RATE: 77 BPM | BODY MASS INDEX: 28.12 KG/M2 | SYSTOLIC BLOOD PRESSURE: 116 MMHG

## 2018-04-16 DIAGNOSIS — C90.00 MULTIPLE MYELOMA NOT HAVING ACHIEVED REMISSION (H): Primary | ICD-10-CM

## 2018-04-16 DIAGNOSIS — Z95.0 CARDIAC PACEMAKER IN SITU: Primary | ICD-10-CM

## 2018-04-16 LAB
ALBUMIN SERPL-MCNC: 3.3 G/DL (ref 3.4–5)
ALP SERPL-CCNC: 85 U/L (ref 40–150)
ALT SERPL W P-5'-P-CCNC: 16 U/L (ref 0–50)
ANION GAP SERPL CALCULATED.3IONS-SCNC: 8 MMOL/L (ref 3–14)
AST SERPL W P-5'-P-CCNC: 14 U/L (ref 0–45)
BASOPHILS # BLD AUTO: 0 10E9/L (ref 0–0.2)
BASOPHILS NFR BLD AUTO: 0.5 %
BILIRUB SERPL-MCNC: 0.6 MG/DL (ref 0.2–1.3)
BUN SERPL-MCNC: 17 MG/DL (ref 7–30)
CALCIUM SERPL-MCNC: 8.5 MG/DL (ref 8.5–10.1)
CHLORIDE SERPL-SCNC: 104 MMOL/L (ref 94–109)
CO2 SERPL-SCNC: 29 MMOL/L (ref 20–32)
CREAT SERPL-MCNC: 0.67 MG/DL (ref 0.52–1.04)
DIFFERENTIAL METHOD BLD: ABNORMAL
EOSINOPHIL # BLD AUTO: 0.2 10E9/L (ref 0–0.7)
EOSINOPHIL NFR BLD AUTO: 3.9 %
ERYTHROCYTE [DISTWIDTH] IN BLOOD BY AUTOMATED COUNT: 13.4 % (ref 10–15)
GFR SERPL CREATININE-BSD FRML MDRD: 84 ML/MIN/1.7M2
GLUCOSE SERPL-MCNC: 92 MG/DL (ref 70–99)
HCT VFR BLD AUTO: 36.2 % (ref 35–47)
HGB BLD-MCNC: 12.1 G/DL (ref 11.7–15.7)
IMM GRANULOCYTES # BLD: 0 10E9/L (ref 0–0.4)
IMM GRANULOCYTES NFR BLD: 0.5 %
LYMPHOCYTES # BLD AUTO: 1.5 10E9/L (ref 0.8–5.3)
LYMPHOCYTES NFR BLD AUTO: 25.1 %
MCH RBC QN AUTO: 29.6 PG (ref 26.5–33)
MCHC RBC AUTO-ENTMCNC: 33.4 G/DL (ref 31.5–36.5)
MCV RBC AUTO: 89 FL (ref 78–100)
MONOCYTES # BLD AUTO: 0.8 10E9/L (ref 0–1.3)
MONOCYTES NFR BLD AUTO: 13 %
NEUTROPHILS # BLD AUTO: 3.4 10E9/L (ref 1.6–8.3)
NEUTROPHILS NFR BLD AUTO: 57 %
NRBC # BLD AUTO: 0 10*3/UL
NRBC BLD AUTO-RTO: 0 /100
PLATELET # BLD AUTO: 127 10E9/L (ref 150–450)
POTASSIUM SERPL-SCNC: 3.5 MMOL/L (ref 3.4–5.3)
PROT SERPL-MCNC: 6.6 G/DL (ref 6.8–8.8)
RBC # BLD AUTO: 4.09 10E12/L (ref 3.8–5.2)
SODIUM SERPL-SCNC: 141 MMOL/L (ref 133–144)
WBC # BLD AUTO: 5.9 10E9/L (ref 4–11)

## 2018-04-16 PROCEDURE — 83883 ASSAY NEPHELOMETRY NOT SPEC: CPT | Performed by: INTERNAL MEDICINE

## 2018-04-16 PROCEDURE — 80053 COMPREHEN METABOLIC PANEL: CPT | Performed by: INTERNAL MEDICINE

## 2018-04-16 PROCEDURE — 85025 COMPLETE CBC W/AUTO DIFF WBC: CPT | Performed by: INTERNAL MEDICINE

## 2018-04-16 PROCEDURE — 93294 REM INTERROG EVL PM/LDLS PM: CPT | Performed by: INTERNAL MEDICINE

## 2018-04-16 PROCEDURE — 84165 PROTEIN E-PHORESIS SERUM: CPT | Performed by: INTERNAL MEDICINE

## 2018-04-16 PROCEDURE — 36415 COLL VENOUS BLD VENIPUNCTURE: CPT

## 2018-04-16 PROCEDURE — 93296 REM INTERROG EVL PM/IDS: CPT | Performed by: INTERNAL MEDICINE

## 2018-04-16 PROCEDURE — 25000128 H RX IP 250 OP 636: Mod: JW | Performed by: INTERNAL MEDICINE

## 2018-04-16 PROCEDURE — 96401 CHEMO ANTI-NEOPL SQ/IM: CPT

## 2018-04-16 PROCEDURE — 00000402 ZZHCL STATISTIC TOTAL PROTEIN: Performed by: INTERNAL MEDICINE

## 2018-04-16 PROCEDURE — 82784 ASSAY IGA/IGD/IGG/IGM EACH: CPT | Performed by: INTERNAL MEDICINE

## 2018-04-16 RX ORDER — LORAZEPAM 0.5 MG/1
0.5 TABLET ORAL EVERY 8 HOURS PRN
Qty: 10 TABLET | Refills: 3 | Status: SHIPPED | OUTPATIENT
Start: 2018-04-16 | End: 2018-08-13

## 2018-04-16 RX ADMIN — BORTEZOMIB 2.7 MG: 3.5 INJECTION, POWDER, LYOPHILIZED, FOR SOLUTION INTRAVENOUS; SUBCUTANEOUS at 15:20

## 2018-04-16 ASSESSMENT — PAIN SCALES - GENERAL: PAINLEVEL: NO PAIN (0)

## 2018-04-16 NOTE — PROGRESS NOTES
[employer] Advisa (D) Remote PPM Device Check  AP: 55 % : <1 %  Mode: DDDR        Presenting Rhythm: AP/VS  Heart Rate: Adequate rates per histogram  Sensing: Stable    Pacing Threshold: Stable    Impedance: Stable  Battery Status: 7.5-10 years  Atrial Arrhythmia: 10 mode switch episodes comprising <1% of the time. Longest episode lasted 3 minutes 22 seconds, ventricular rates controlled. 1 EGM shows As>Vs with 2:1 conduction for AFlutter. Taking ASA only.     Ventricular Arrhythmia: None     Care Plan: F/u annual threshold in 3 months, order placed. Gave patient results over the phone. EJosy,CVT

## 2018-04-16 NOTE — PROGRESS NOTES
Infusion Nursing Note:  Quiana Dunaway presents today for Velcade C12D1.    Patient seen by provider today: No   present during visit today: Not Applicable.    Note: Patient states she has had a productive cough for the last three weeks. Sputum is yellow.  Denies fevers. Lungs are clear on ausculation.  Spoke with Dr. Parry regarding cough. Per Dr. Parry patient can take an OTC cough suppressant. If she gets worse, or has fevers, make an appointment to see him.     Intravenous Access:  Lab draw site Left AC, Needle type Butterfly, Gauge 23.  Labs drawn without difficulty.    Treatment Conditions:  Lab Results   Component Value Date    HGB 12.1 04/16/2018     Lab Results   Component Value Date    WBC 5.9 04/16/2018      Lab Results   Component Value Date    ANEU 3.4 04/16/2018     Lab Results   Component Value Date     04/16/2018      Lab Results   Component Value Date     04/16/2018                   Lab Results   Component Value Date    POTASSIUM 3.5 04/16/2018           No results found for: MAG         Lab Results   Component Value Date    CR 0.67 04/16/2018                   Lab Results   Component Value Date    HUMBERTO 8.5 04/16/2018                Lab Results   Component Value Date    BILITOTAL 0.6 04/16/2018           Lab Results   Component Value Date    ALBUMIN 3.3 04/16/2018                    Lab Results   Component Value Date    ALT 16 04/16/2018           Lab Results   Component Value Date    AST 14 04/16/2018       Results reviewed, labs MET treatment parameters, ok to proceed with treatment.    Post Infusion Assessment:  Patient tolerated injection without incident.  Site patent and intact, free from redness, edema or discomfort.  No evidence of extravasations.    Discharge Plan:   Patient will  Ativan and Compazine refills prior to leaving today.  Discharge instructions reviewed with: Patient.  Patient and/or family verbalized understanding of discharge instructions and  all questions answered.  AVS to patient via [employer].  Patient will return 4/23/2108 for next appointment.   Patient discharged in stable condition accompanied by: self and daughter-in-law.  Departure Mode: Ambulatory.    Ammy Thomas RN

## 2018-04-16 NOTE — PROGRESS NOTES
Oral Chemotherapy Monitoring Program.    Patient currently on Revlimid therapy.    Reviewed lab results from 4/16/18.    There were no concerning abnormalities.    Will follow up in 3 weeks    Laci Escobar PharmD  Oral Chemotherapy Program

## 2018-04-16 NOTE — MR AVS SNAPSHOT
After Visit Summary   4/16/2018    Quiana Dunaway    MRN: [id number]           Patient Information     Date Of Birth          1935        Visit Information        Provider Department      4/16/2018 2:00 PM SH INFUSION CHAIR 10 [facility]        Today's Diagnoses     Multiple myeloma not having achieved remission (H)    -  1       Follow-ups after your visit        Your next 10 appointments already scheduled     Apr 17, 2018  2:15 PM CDT   [facility] EP RETURN with Alberto Worthington MD   [facility] ([facility])    [address]  [place] [address]   [phone] OPT 2            Apr 23, 2018  2:00 PM CDT   Level 2 with SH INFUSION CHAIR 12   [facility] ([facility])    [facility]  [address]  [place] [address]   [phone]            Apr 30, 2018  2:00 PM CDT   Level 2 with  INFUSION CHAIR 10   [facility] ([facility])    [facility]  [address]  [place] [address]   [phone]            May 07, 2018  1:40 PM CDT   Return Visit with Alex Parry MD   [facility] ([facility])    [facility]  [address]  [place] [address]   [phone]            May 07, 2018  2:00 PM CDT   Level 2 with  INFUSION CHAIR 7   [facility] ([facility])    [facility]  [address]  [place] [address]   [phone]            May 14, 2018  1:00 PM CDT   Return Visit with Leona Rodriguez PA-C   [facility] [place] ([facility] [place])    [address]  [place] [address]   [phone]            May 14, 2018  2:00 PM CDT   Level 2 with SH  INFUSION CHAIR 8   [facility] ([facility])    [facility]  [address]  [place] [address]   [phone]            May 21, 2018  2:00 PM CDT   Level 2 with SH INFUSION CHAIR 1   [facility] ([facility])    [facility]  [address]  [place] [address]   [phone]            May 29, 2018  2:00 PM CDT   Level 2 with SH INFUSION CHAIR 9   [facility] ([facility])    [facility]  [address]  [place] [address]   [phone]            Jun 04, 2018  2:00 PM CDT   Level 2 with SH INFUSION CHAIR 11   [facility] ([facility])    [facility]  [address]  [place] [address]   [phone]              Who to contact     If you have questions or need follow up information about today's clinic visit or your schedule please contact [facility] directly at [phone].  Normal or non-critical lab and imaging results will be communicated to you by [employer], letter or phone within 4 business days after the clinic has received the results. If you do not hear from us within 7 days, please contact the clinic through [employer] or phone. If you have a critical or abnormal lab result, we will notify you by phone as soon as possible.  Submit refill requests through [employer] or call your pharmacy and they will forward the refill request to us. Please allow 3 business days for your refill to be completed.          Additional Information About Your Visit        [employer]pshare Information     [employer] gives you secure access to your electronic health record. If you see a primary care provider, you can also send messages to your care team and make appointments. If you have questions,  please call your primary care clinic.  If you do not have a primary care provider, please call [phone] and they will assist you.        Care EveryWhere ID     This is your Care EveryWhere ID. This could be used by other organizations to access your [facility] medical records  [id number]        Your Vitals Were     Pulse Temperature Respirations Pulse Oximetry BMI (Body Mass Index)       77 98.8  F (37.1  C) (Oral) 16 96% 28.12 kg/m2        Blood Pressure from Last 3 Encounters:   04/16/18 116/64   04/09/18 (!) 122/95   04/09/18 112/58    Weight from Last 3 Encounters:   04/16/18 69.8 kg (153 lb 12.8 oz)   04/09/18 70.3 kg (155 lb)   04/09/18 70.3 kg (155 lb)              We Performed the Following     CBC with platelets differential     Comprehensive metabolic panel     IgG     Kappa and lambda light chain     Protein electrophoresis          Where to get your medicines      Some of these will need a paper prescription and others can be bought over the counter.  Ask your nurse if you have questions.     Bring a paper prescription for each of these medications     LORazepam 0.5 MG tablet          Primary Care Provider Office Phone # Fax #    César Chung [phone]       [address] [place] [address] [address]        Equal Access to Services     LILLIANA SANDOVAL : Hadii aad ku hadasho Soomaali, waaxda luqadaha, qaybta kaalmada adeegyada, phoenix gaines haydiana rodriguez . So [facility] [phone].    ATENCIÓN: Si habla español, tiene a contreras disposición servicios gratuitos de asistencia lingüística. Llame al [phone].    We comply with applicable federal civil rights laws and [place] laws. We do not discriminate on the basis of race, color, national origin, age, disability, sex, sexual orientation, or gender identity.            Thank you!     Thank you for choosing [facility] AND [facility]  for your care. Our goal is always to provide you with  excellent care. Hearing back from our patients is one way we can continue to improve our services. Please take a few minutes to complete the written survey that you may receive in the mail after your visit with us. Thank you!             Your Updated Medication List - Protect others around you: Learn how to safely use, store and throw away your medicines at www.disposemymeds.org.          This list is accurate as of 4/16/18  3:25 PM.  Always use your most recent med list.                   Brand Name Dispense Instructions for use Diagnosis    acyclovir 400 MG tablet    ZOVIRAX    60 tablet    Take 1 tablet (400 mg) by mouth 2 times daily Viral Prophylaxis.    Multiple myeloma not having achieved remission (H)       aspirin 81 MG chewable tablet      Take 81 mg by mouth daily        calcium carbonate-vitamin D 600-400 MG-UNIT Chew     180 tablet    Take 1 chew tab by mouth 2 times daily    Multiple myeloma not having achieved remission (H)       * dexamethasone 4 MG tablet    DECADRON    30 tablet    Take 10 tablets (40 mg) by mouth every 7 days Days 1, 8, and 15.    Multiple myeloma not having achieved remission (H)       * dexamethasone 4 MG tablet    DECADRON    30 tablet    Take 10 tablets (40 mg) by mouth every 7 days for 3 doses Days 1, 8, and 15.    Multiple myeloma not having achieved remission (H)       escitalopram 5 MG tablet    LEXAPRO    90 tablet    TAKE 1 TABLET BY MOUTH DAILY    ROBER (generalized anxiety disorder)       IMODIUM A-D PO      Take by mouth as needed        LENalidomide 10 MG Caps capsule CHEMO    REVLIMID    14 capsule    Take 1 capsule (10 mg) by mouth daily for 14 days Days 1 through 14 then off for 7 days.    Multiple myeloma not having achieved remission (H)       lisinopril 10 MG tablet    PRINIVIL/ZESTRIL    90 tablet    Take 1 tablet (10 mg) by mouth daily    Benign essential hypertension       LORazepam 0.5 MG tablet    ATIVAN    10 tablet    Take 1 tablet (0.5 mg) by mouth every  8 hours as needed (Anxiety, Nausea/Vomiting or Sleep)    Multiple myeloma not having achieved remission (H)       nitroGLYcerin 0.4 MG sublingual tablet    NITROSTAT    25 tablet    For chest pain place 1 tablet under the tongue every 5 minutes for 3 doses. If symptoms persist 5 minutes after 1st dose call 911.    Atypical chest pain       * order for DME     1 Units    Dispense one 4 wheeled walker with hand brakes and seat    Multiple myeloma not having achieved remission (H)       * order for DME     1 Units    Wheelchair.    Need for assistance due to unsteady gait       prochlorperazine 10 MG tablet    COMPAZINE    30 tablet    Take 1 tablet (10 mg) by mouth every 6 hours as needed (Nausea/Vomiting)    Multiple myeloma not having achieved remission (H)       tolterodine 4 MG 24 hr capsule    DETROL LA    90 capsule    Take 1 capsule (4 mg) by mouth daily    Urinary frequency       * Notice:  This list has 4 medication(s) that are the same as other medications prescribed for you. Read the directions carefully, and ask your doctor or other care provider to review them with you.

## 2018-04-16 NOTE — MR AVS SNAPSHOT
After Visit Summary   4/16/2018    Quiana Dunaway    MRN: [id number]           Patient Information     Date Of Birth          1935        Visit Information        Provider Department      4/16/2018 4:45 PM RIOS TECH1 [facility]        Today's Diagnoses     Cardiac pacemaker in situ    -  1       Follow-ups after your visit        Additional Services     Follow-Up with Device Clinic                 Your next 10 appointments already scheduled     Apr 16, 2018  4:45 PM CDT   Remote PPM Check with RIOS TECH1   [facility] ([facility])    [address]  [place] [address]   [phone] OPT 2           This appointment is for a remote check of your pacemaker.  This is not an appointment at the office.            Apr 17, 2018  2:15 PM CDT   [facility] EP RETURN with Alberto Worthington MD   [facility] ([facility])    [address]  [place] [address] [phone] OPT 2            Apr 23, 2018  2:00 PM CDT   Level 2 with  INFUSION CHAIR 12   [facility] ([facility])    [facility]  [address]  [place] [address]   [phone]            Apr 30, 2018  2:00 PM CDT   Level 2 with  INFUSION CHAIR 10   [facility] ([facility])    [facility]  [address]  [place] [address]   [phone]            May 07, 2018  1:40 PM CDT   Return Visit with Alex Parry MD   [facility] ([facility])    [facility] [place]  [address]  [place] [address]   [phone]            May 07, 2018  2:00 PM CDT   Level 2 with  INFUSION CHAIR 7   [facility] ([facility])    [facility]  [facility]  [address]  [place] [address]   [phone]            May 14, 2018  1:00 PM CDT   Return Visit with Leona Rodriguez PA-C   [facility] [place] ([facility])    [address]  [address]  [place] [address] [phone]            May 14, 2018  2:00 PM CDT   Level 2 with SH INFUSION CHAIR 8   [facility] ([facility])    [facility] [place]  [address]  [place] [address]   [phone]            May 21, 2018  2:00 PM CDT   Level 2 with SH INFUSION CHAIR 1   [facility] ([facility])    [facility] [place]  [address]  [place] [address]   [phone]            May 29, 2018  2:00 PM CDT   Level 2 with SH INFUSION CHAIR 9   [facility] ([facility])    [facility]  [address]  [place] [address] [phone]              Future tests that were ordered for you today     Open Future Orders        Priority Expected Expires Ordered    Follow-Up with Device Clinic Routine 7/16/2018 4/16/2019 4/16/2018            Who to contact     If you have questions or need follow up information about today's clinic visit or your schedule please contact [facility]   [place] directly at [phone].  Normal or non-critical lab and imaging results will be communicated to you by MyChart, letter or phone within 4 business days after the clinic has received the results. If you do not hear from us within 7 days, please contact the clinic through MyChart or phone. If you have a critical or abnormal lab result, we will notify you by phone as soon as possible.  Submit refill requests through [employer] or call your pharmacy and they will forward the refill request to us. Please allow 3 business  days for your refill to be completed.          Additional Information About Your Visit        MyChart Information     [employer] gives you secure access to your electronic health record. If you see a primary care provider, you can also send messages to your care team and make appointments. If you have questions, please call your primary care clinic.  If you do not have a primary care provider, please call [phone] and they will assist you.        Care EveryWhere ID     This is your Care EveryWhere ID. This could be used by other organizations to access your [facility] medical records  [id number]         Blood Pressure from Last 3 Encounters:   04/16/18 116/64   04/09/18 (!) 122/95   04/09/18 112/58    Weight from Last 3 Encounters:   04/16/18 69.8 kg (153 lb 12.8 oz)   04/09/18 70.3 kg (155 lb)   04/09/18 70.3 kg (155 lb)              We Performed the Following     INTERROGATION DEVICE EVAL REMOTE, PACER/ICD (43205)     PM DEVICE INTERROGATE REMOTE (68209)          Where to get your medicines      Some of these will need a paper prescription and others can be bought over the counter.  Ask your nurse if you have questions.     Bring a paper prescription for each of these medications     LORazepam 0.5 MG tablet          Primary Care Provider Office Phone # Fax #    César JAQUEZ MD Juliet [phone]       [facility] [address]  [place] [address]        Equal Access to Services     LILLIANA SANDOVAL : Hadii aad ku hadasho Soomaali, waaxda luqadaha, qaybta kaalmada adeegyada, waxay liana villeda. So [facility] [phone].    ATENCIÓN: Si habla español, tiene a contreras disposición servicios gratuitos de asistencia lingüística. Llame al [phone].    We comply with applicable federal civil rights laws and [place] laws. We do not discriminate on the basis of race, color, national origin, age, disability, sex, sexual orientation, or gender identity.            Thank you!      Thank you for choosing [facility]  for your care. Our goal is always to provide you with excellent care. Hearing back from our patients is one way we can continue to improve our services. Please take a few minutes to complete the written survey that you may receive in the mail after your visit with us. Thank you!             Your Updated Medication List - Protect others around you: Learn how to safely use, store and throw away your medicines at www.disposemymeds.org.          This list is accurate as of 4/16/18  4:11 PM.  Always use your most recent med list.                   Brand Name Dispense Instructions for use Diagnosis    acyclovir 400 MG tablet    ZOVIRAX    60 tablet    Take 1 tablet (400 mg) by mouth 2 times daily Viral Prophylaxis.    Multiple myeloma not having achieved remission (H)       aspirin 81 MG chewable tablet      Take 81 mg by mouth daily        calcium carbonate-vitamin D 600-400 MG-UNIT Chew     180 tablet    Take 1 chew tab by mouth 2 times daily    Multiple myeloma not having achieved remission (H)       * dexamethasone 4 MG tablet    DECADRON    30 tablet    Take 10 tablets (40 mg) by mouth every 7 days Days 1, 8, and 15.    Multiple myeloma not having achieved remission (H)       * dexamethasone 4 MG tablet    DECADRON    30 tablet    Take 10 tablets (40 mg) by mouth every 7 days for 3 doses Days 1, 8, and 15.    Multiple myeloma not having achieved remission (H)       escitalopram 5 MG tablet    LEXAPRO    90 tablet    TAKE 1 TABLET BY MOUTH DAILY    ROBER (generalized anxiety disorder)       IMODIUM A-D PO      Take by mouth as needed        LENalidomide 10 MG Caps capsule CHEMO    REVLIMID    14 capsule    Take 1 capsule (10 mg) by mouth daily for 14 days Days 1 through 14 then off for 7 days.    Multiple myeloma not having achieved remission (H)       lisinopril 10 MG tablet    PRINIVIL/ZESTRIL    90 tablet    Take 1 tablet (10 mg) by mouth daily     Benign essential hypertension       LORazepam 0.5 MG tablet    ATIVAN    10 tablet    Take 1 tablet (0.5 mg) by mouth every 8 hours as needed (Anxiety, Nausea/Vomiting or Sleep)    Multiple myeloma not having achieved remission (H)       nitroGLYcerin 0.4 MG sublingual tablet    NITROSTAT    25 tablet    For chest pain place 1 tablet under the tongue every 5 minutes for 3 doses. If symptoms persist 5 minutes after 1st dose call 911.    Atypical chest pain       * order for DME     1 Units    Dispense one 4 wheeled walker with hand brakes and seat    Multiple myeloma not having achieved remission (H)       * order for DME     1 Units    Wheelchair.    Need for assistance due to unsteady gait       prochlorperazine 10 MG tablet    COMPAZINE    30 tablet    Take 1 tablet (10 mg) by mouth every 6 hours as needed (Nausea/Vomiting)    Multiple myeloma not having achieved remission (H)       tolterodine 4 MG 24 hr capsule    DETROL LA    90 capsule    Take 1 capsule (4 mg) by mouth daily    Urinary frequency       * Notice:  This list has 4 medication(s) that are the same as other medications prescribed for you. Read the directions carefully, and ask your doctor or other care provider to review them with you.

## 2018-04-17 ENCOUNTER — OFFICE VISIT (OUTPATIENT)
Dept: CARDIOLOGY | Facility: CLINIC | Age: 83
End: 2018-04-17
Attending: NURSE PRACTITIONER
Payer: MEDICARE

## 2018-04-17 VITALS
DIASTOLIC BLOOD PRESSURE: 74 MMHG | SYSTOLIC BLOOD PRESSURE: 140 MMHG | WEIGHT: 152 LBS | BODY MASS INDEX: 27.97 KG/M2 | HEIGHT: 62 IN | HEART RATE: 76 BPM

## 2018-04-17 DIAGNOSIS — I48.0 PAROXYSMAL ATRIAL FIBRILLATION (H): ICD-10-CM

## 2018-04-17 DIAGNOSIS — Z95.0 CARDIAC PACEMAKER IN SITU: Primary | ICD-10-CM

## 2018-04-17 LAB
ALBUMIN SERPL ELPH-MCNC: 3.6 G/DL (ref 3.7–5.1)
ALPHA1 GLOB SERPL ELPH-MCNC: 0.4 G/DL (ref 0.2–0.4)
ALPHA2 GLOB SERPL ELPH-MCNC: 0.9 G/DL (ref 0.5–0.9)
B-GLOBULIN SERPL ELPH-MCNC: 0.7 G/DL (ref 0.6–1)
GAMMA GLOB SERPL ELPH-MCNC: 0.6 G/DL (ref 0.7–1.6)
IGG SERPL-MCNC: 565 MG/DL (ref 695–1620)
KAPPA LC UR-MCNC: 2.94 MG/DL (ref 0.33–1.94)
KAPPA LC/LAMBDA SER: 5.07 {RATIO} (ref 0.26–1.65)
LAMBDA LC SERPL-MCNC: 0.58 MG/DL (ref 0.57–2.63)
M PROTEIN SERPL ELPH-MCNC: 0.3 G/DL
PROT PATTERN SERPL ELPH-IMP: ABNORMAL

## 2018-04-17 PROCEDURE — 99214 OFFICE O/P EST MOD 30 MIN: CPT | Performed by: INTERNAL MEDICINE

## 2018-04-17 NOTE — LETTER
"4/17/2018    César Chung MD  [facility] [place] [address]  [place] [address]    RE: Quiana Dunaway       Dear Colleague,    I had the pleasure of seeing Quiana Dunaway in the [facility].    [id number]    Electrophysiology/ Clinic Note         H&P and Plan:       Alberto Worthington MD    Physical Exam:  Vitals: /74  Pulse 76  Ht 1.575 m (5' 2\")  Wt 68.9 kg (152 lb)  BMI 27.8 kg/m2    Constitutional:  AAO x3.  Pt is in NAD.  HEAD: normocephalic.  SKIN: Skin normal color, texture and turgor with no lesions or eruptions.  Eyes: PERRL, EOMI.  ENT:  Supple, normal JVP. No lymphadenopathy or thyroid enlargement.  Chest:  CTAB.  Cardiac:  RRR, normal  S1 and S2.  No murmurs rubs or gallop.   Abdomen:  Normal BS.  Soft, non-tender and non-distended.  No rebound or guarding.    Extremities:  Pedious pulses palpable B/L.  No LE edema noticed.   Neurological: Strength and sensation grossly symmetric and intact throughout.       CURRENT MEDICATIONS:  Current Outpatient Prescriptions   Medication Sig Dispense Refill     LORazepam (ATIVAN) 0.5 MG tablet Take 1 tablet (0.5 mg) by mouth every 8 hours as needed (Anxiety, Nausea/Vomiting or Sleep) 10 tablet 3     LENalidomide (REVLIMID) 10 MG CAPS capsule CHEMO Take 1 capsule (10 mg) by mouth daily for 14 days Days 1 through 14 then off for 7 days. 14 capsule 0     dexamethasone (DECADRON) 4 MG tablet Take 10 tablets (40 mg) by mouth every 7 days for 3 doses Days 1, 8, and 15. 30 tablet 0     acyclovir (ZOVIRAX) 400 MG tablet Take 1 tablet (400 mg) by mouth 2 times daily Viral Prophylaxis. 60 tablet 5     Loperamide HCl (IMODIUM A-D PO) Take by mouth as needed       escitalopram (LEXAPRO) 5 MG tablet TAKE 1 TABLET BY MOUTH DAILY 90 tablet 1     dexamethasone (DECADRON) 4 MG tablet Take 10 tablets (40 mg) by mouth every 7 days Days 1, 8, and 15. 30 tablet 0     lisinopril (PRINIVIL/ZESTRIL) 10 MG tablet Take 1 tablet (10 mg) " by mouth daily 90 tablet 3     calcium carbonate-vitamin D 600-400 MG-UNIT CHEW Take 1 chew tab by mouth 2 times daily 180 tablet 3     nitroglycerin (NITROSTAT) 0.4 MG sublingual tablet For chest pain place 1 tablet under the tongue every 5 minutes for 3 doses. If symptoms persist 5 minutes after 1st dose call 911. 25 tablet 0     aspirin 81 MG chewable tablet Take 81 mg by mouth daily        tolterodine (DETROL LA) 4 MG 24 hr capsule Take 1 capsule (4 mg) by mouth daily 90 capsule 3     prochlorperazine (COMPAZINE) 10 MG tablet Take 1 tablet (10 mg) by mouth every 6 hours as needed (Nausea/Vomiting) 30 tablet 3     order for DME Wheelchair. 1 Units 0     order for DME Dispense one 4 wheeled walker with hand brakes and seat 1 Units 0       ALLERGIES   No Known Allergies    PAST MEDICAL HISTORY:  Past Medical History:   Diagnosis Date     HTN (hypertension)      Multiple myeloma not having achieved remission (H) 7/18/2017     Pacemaker      Pancytopenia (H) 6/28/2017       PAST SURGICAL HISTORY:  Past Surgical History:   Procedure Laterality Date     BONE MARROW BIOPSY, BONE SPECIMEN, NEEDLE/TROCAR N/A 7/12/2017    Procedure: BIOPSY BONE MARROW;  BONE MARROW BIOPSY ;  Surgeon: Grace Vela MD;  Location:  GI     IMPLANT PACEMAKER  03/2017    AV block       FAMILY HISTORY:  Family History   Problem Relation Age of Onset     Unknown/Adopted Mother      Unknown/Adopted Father        SOCIAL HISTORY:  Social History     Social History     Marital status:      Spouse name: N/A     Number of children: N/A     Years of education: N/A     Social History Main Topics     Smoking status: Never Smoker     Smokeless tobacco: Never Used     Alcohol use No     Drug use: No     Sexual activity: No     Other Topics Concern     None     Social History Narrative       Review of Systems:  Skin:        Eyes:       ENT:       Respiratory:       Cardiovascular:       Gastroenterology:      Genitourinary:        Musculoskeletal:       Neurologic:       Psychiatric:       Heme/Lymph/Imm:       Endocrine:           Recent Lab Results:  LIPID RESULTS:  Lab Results   Component Value Date    CHOL 176 2015    HDL 82 2015    LDL 75 2015    TRIG 93 2015    CHOLHDLRATIO 2.8 2009       LIVER ENZYME RESULTS:  Lab Results   Component Value Date    AST 14 2018    ALT 16 2018       CBC RESULTS:  Lab Results   Component Value Date    WBC 5.9 2018    RBC 4.09 2018    HGB 12.1 2018    HCT 36.2 2018    MCV 89 2018    MCH 29.6 2018    MCHC 33.4 2018    RDW 13.4 2018     (L) 2018       BMP RESULTS:  Lab Results   Component Value Date     2018    POTASSIUM 3.5 2018    CHLORIDE 104 2018    CO2 29 2018    ANIONGAP 8 2018    GLC 92 2018    BUN 17 2018    CR 0.67 2018    GFRESTIMATED 84 2018    GFRESTBLACK >90 2018    HUMBERTO 8.5 2018        A1C RESULTS:  No results found for: A1C    INR RESULTS:  No results found for: INR      ECHOCARDIOGRAM  Recent Results (from the past 8760 hour(s))   Echocardiogram    Narrative    [id number]  [facility]  [id number]  [id number]^CHRIS^YVES^        [facility]  [facility]  Echocardiography Laboratory  [facility]  [address]  [place] [address]  Phone [phone]  Fax [phone]        Name: DUGLAS CASTILLO  MRN: [id number]  : 1935  Study Date: 2017 02:21 PM  Age: 82 yrs  Gender: Female  Patient Location: [facility]  Reason For Study: Shortness of breath  Ordering Physician: YVES PEREZ  Referring Physician: YVES PEREZ  Performed By: Kimberli Joyner,     BSA: 1.8 m2  Height: 62 in  Weight: 163 lb  HR: 85  BP: 121/50 mmHg  _____________________________________________________________________________  __     Procedure  Complete Echo Adult.      _____________________________________________________________________________  __        Interpretation Summary     There is mild to moderate (1-2+) mitral regurgitation.  Left ventricular systolic function is normal.  Grade II or moderate diastolic dysfunction.  No regional wall motion abnormalities noted.  _____________________________________________________________________________  __        Left Ventricle  The left ventricle is normal in size. There is mild concentric left  ventricular hypertrophy. Left ventricular systolic function is normal. The  visual ejection fraction is estimated at 60-65%. Grade II or moderate  diastolic dysfunction. E by E prime ratio is between 8 and 15, which is  indeterminate for assessment of left ventricular filling pressures. No  regional wall motion abnormalities noted.     Right Ventricle  The right ventricle is grossly normal size. The right ventricular systolic  function is normal. There is a pacemaker lead in the right ventricle.     Atria  The left atrium is borderline dilated. The right atrium is borderline dilated.  Pacer wire in right atrium. There is no atrial shunt seen.     Mitral Valve  The mitral valve leaflets appear normal. There is no evidence of stenosis,  fluttering, or prolapse. There is mild to moderate (1-2+) mitral  regurgitation.     Tricuspid Valve  The tricuspid valve is not well visualized, but is grossly normal. There is  mild (1+) tricuspid regurgitation. The right ventricular systolic pressure is  approximated at 23.1 mmHg plus the right atrial pressure. Normal IVC (1.5-  2.5cm) with >50% respiratory collapse; right atrial pressure is estimated at  5-10mmHg.        Aortic Valve  The aortic valve is trileaflet. No aortic regurgitation is present. No  hemodynamically significant valvular aortic stenosis.     Pulmonic Valve  The pulmonic valve is not well seen, but is grossly normal. The pulmonic valve  is not well visualized. There is mild (1+)  pulmonic valvular regurgitation.  Normal pulmonic valve velocity.     Vessels  The aortic root is normal size. Normal size ascending aorta. The IVC is normal  in size and reactivity with respiration, suggesting normal central venous  pressure.     Pericardium  Trivial pericardial effusion.     Rhythm  The rhythm was normal sinus.     _____________________________________________________________________________  __  MMode/2D Measurements & Calculations  IVSd: 1.3 cm  LVIDd: 3.9 cm  LVIDs: 2.7 cm  LVPWd: 1.2 cm  FS: 32.4 %  EDV(Teich): 67.1 ml  ESV(Teich): 25.9 ml  LV mass(C)d: 169.3 grams  LV mass(C)dI: 96.6 grams/m2  Ao root diam: 3.1 cm  LA dimension: 3.5 cm     asc Aorta Diam: 3.5 cm  LA/Ao: 1.1        Doppler Measurements & Calculations  MV E max kierra: 84.4 cm/sec  MV A max kierra: 105.3 cm/sec  MV E/A: 0.80  MV dec time: 0.36 sec  PA acc time: 0.11 sec  TR max kierra: 240.1 cm/sec  TR max P.1 mmHg  Lateral E/e': 9.3  Medial E/e': 13.6              _____________________________________________________________________________  __        Report approved by: Jasmeet Varela 2017 04:45 PM            No orders of the defined types were placed in this encounter.    No orders of the defined types were placed in this encounter.    There are no discontinued medications.      Encounter Diagnosis   Name Primary?     Atrial fibrillation (H)          LUIS Webb, APRN CNP  [address]  [place] [address]                Thank you for allowing me to participate in the care of your patient.      Sincerely,     Alberto Worthington MD     [facility]    cc:   Jacqueline Webb, APRN CNP  [address]  [place] [address]

## 2018-04-17 NOTE — PROGRESS NOTES
"[id number]    Electrophysiology/ Clinic Note         H&P and Plan:       Alberto Worthington MD    Physical Exam:  Vitals: /74  Pulse 76  Ht 1.575 m (5' 2\")  Wt 68.9 kg (152 lb)  BMI 27.8 kg/m2    Constitutional:  AAO x3.  Pt is in NAD.  HEAD: normocephalic.  SKIN: Skin normal color, texture and turgor with no lesions or eruptions.  Eyes: PERRL, EOMI.  ENT:  Supple, normal JVP. No lymphadenopathy or thyroid enlargement.  Chest:  CTAB.  Cardiac:  RRR, normal  S1 and S2.  No murmurs rubs or gallop.   Abdomen:  Normal BS.  Soft, non-tender and non-distended.  No rebound or guarding.    Extremities:  Pedious pulses palpable B/L.  No LE edema noticed.   Neurological: Strength and sensation grossly symmetric and intact throughout.       CURRENT MEDICATIONS:  Current Outpatient Prescriptions   Medication Sig Dispense Refill     LORazepam (ATIVAN) 0.5 MG tablet Take 1 tablet (0.5 mg) by mouth every 8 hours as needed (Anxiety, Nausea/Vomiting or Sleep) 10 tablet 3     LENalidomide (REVLIMID) 10 MG CAPS capsule CHEMO Take 1 capsule (10 mg) by mouth daily for 14 days Days 1 through 14 then off for 7 days. 14 capsule 0     dexamethasone (DECADRON) 4 MG tablet Take 10 tablets (40 mg) by mouth every 7 days for 3 doses Days 1, 8, and 15. 30 tablet 0     acyclovir (ZOVIRAX) 400 MG tablet Take 1 tablet (400 mg) by mouth 2 times daily Viral Prophylaxis. 60 tablet 5     Loperamide HCl (IMODIUM A-D PO) Take by mouth as needed       escitalopram (LEXAPRO) 5 MG tablet TAKE 1 TABLET BY MOUTH DAILY 90 tablet 1     dexamethasone (DECADRON) 4 MG tablet Take 10 tablets (40 mg) by mouth every 7 days Days 1, 8, and 15. 30 tablet 0     lisinopril (PRINIVIL/ZESTRIL) 10 MG tablet Take 1 tablet (10 mg) by mouth daily 90 tablet 3     calcium carbonate-vitamin D 600-400 MG-UNIT CHEW Take 1 chew tab by mouth 2 times daily 180 tablet 3     nitroglycerin (NITROSTAT) 0.4 MG sublingual tablet For chest pain place 1 tablet under the tongue every 5 minutes " for 3 doses. If symptoms persist 5 minutes after 1st dose call 911. 25 tablet 0     aspirin 81 MG chewable tablet Take 81 mg by mouth daily        tolterodine (DETROL LA) 4 MG 24 hr capsule Take 1 capsule (4 mg) by mouth daily 90 capsule 3     prochlorperazine (COMPAZINE) 10 MG tablet Take 1 tablet (10 mg) by mouth every 6 hours as needed (Nausea/Vomiting) 30 tablet 3     order for DME Wheelchair. 1 Units 0     order for DME Dispense one 4 wheeled walker with hand brakes and seat 1 Units 0       ALLERGIES   No Known Allergies    PAST MEDICAL HISTORY:  Past Medical History:   Diagnosis Date     HTN (hypertension)      Multiple myeloma not having achieved remission (H) 7/18/2017     Pacemaker      Pancytopenia (H) 6/28/2017       PAST SURGICAL HISTORY:  Past Surgical History:   Procedure Laterality Date     BONE MARROW BIOPSY, BONE SPECIMEN, NEEDLE/TROCAR N/A 7/12/2017    Procedure: BIOPSY BONE MARROW;  BONE MARROW BIOPSY ;  Surgeon: Grace Vela MD;  Location:  GI     IMPLANT PACEMAKER  03/2017    AV block       FAMILY HISTORY:  Family History   Problem Relation Age of Onset     Unknown/Adopted Mother      Unknown/Adopted Father        SOCIAL HISTORY:  Social History     Social History     Marital status:      Spouse name: N/A     Number of children: N/A     Years of education: N/A     Social History Main Topics     Smoking status: Never Smoker     Smokeless tobacco: Never Used     Alcohol use No     Drug use: No     Sexual activity: No     Other Topics Concern     None     Social History Narrative       Review of Systems:  Skin:        Eyes:       ENT:       Respiratory:       Cardiovascular:       Gastroenterology:      Genitourinary:       Musculoskeletal:       Neurologic:       Psychiatric:       Heme/Lymph/Imm:       Endocrine:           Recent Lab Results:  LIPID RESULTS:  Lab Results   Component Value Date    CHOL 176 02/09/2015    HDL 82 02/09/2015    LDL 75 02/09/2015    TRIG 93  2015    CHOLHDLRATIO 2.8 2009       LIVER ENZYME RESULTS:  Lab Results   Component Value Date    AST 14 2018    ALT 16 2018       CBC RESULTS:  Lab Results   Component Value Date    WBC 5.9 2018    RBC 4.09 2018    HGB 12.1 2018    HCT 36.2 2018    MCV 89 2018    MCH 29.6 2018    MCHC 33.4 2018    RDW 13.4 2018     (L) 2018       BMP RESULTS:  Lab Results   Component Value Date     2018    POTASSIUM 3.5 2018    CHLORIDE 104 2018    CO2 29 2018    ANIONGAP 8 2018    GLC 92 2018    BUN 17 2018    CR 0.67 2018    GFRESTIMATED 84 2018    GFRESTBLACK >90 2018    HUMBERTO 8.5 2018        A1C RESULTS:  No results found for: A1C    INR RESULTS:  No results found for: INR      ECHOCARDIOGRAM  Recent Results (from the past 8760 hour(s))   Echocardiogram    Narrative    [id number]  [id number]  [id number]  [id number]^CHRIS^YVES^        [facility]  Echocardiography Laboratory  [address]  [place] [address]  Phone [phone]  Fax [phone]        Name: DUGLAS CASTILLO  MRN: [id number]  : 1935  Study Date: 2017 02:21 PM  Age: 82 yrs  Gender: Female  [place] [facility]  Reason For Study: Shortness of breath  Ordering Physician: YVES PEREZ  Referring Physician: YVES PEREZ  Performed By: Kimberli Joyner,     BSA: 1.8 m2  Height: 62 in  Weight: 163 lb  HR: 85  BP: 121/50 mmHg  _____________________________________________________________________________  __     Procedure  Complete Echo Adult.     _____________________________________________________________________________  __        Interpretation Summary     There is mild to moderate (1-2+) mitral regurgitation.  Left ventricular systolic function is normal.  Grade II or moderate diastolic dysfunction.  No regional wall motion  abnormalities noted.  _____________________________________________________________________________  __        Left Ventricle  The left ventricle is normal in size. There is mild concentric left  ventricular hypertrophy. Left ventricular systolic function is normal. The  visual ejection fraction is estimated at 60-65%. Grade II or moderate  diastolic dysfunction. E by E prime ratio is between 8 and 15, which is  indeterminate for assessment of left ventricular filling pressures. No  regional wall motion abnormalities noted.     Right Ventricle  The right ventricle is grossly normal size. The right ventricular systolic  function is normal. There is a pacemaker lead in the right ventricle.     Atria  The left atrium is borderline dilated. The right atrium is borderline dilated.  Pacer wire in right atrium. There is no atrial shunt seen.     Mitral Valve  The mitral valve leaflets appear normal. There is no evidence of stenosis,  fluttering, or prolapse. There is mild to moderate (1-2+) mitral  regurgitation.     Tricuspid Valve  The tricuspid valve is not well visualized, but is grossly normal. There is  mild (1+) tricuspid regurgitation. The right ventricular systolic pressure is  approximated at 23.1 mmHg plus the right atrial pressure. Normal IVC (1.5-  2.5cm) with >50% respiratory collapse; right atrial pressure is estimated at  5-10mmHg.        Aortic Valve  The aortic valve is trileaflet. No aortic regurgitation is present. No  hemodynamically significant valvular aortic stenosis.     Pulmonic Valve  The pulmonic valve is not well seen, but is grossly normal. The pulmonic valve  is not well visualized. There is mild (1+) pulmonic valvular regurgitation.  Normal pulmonic valve velocity.     Vessels  The aortic root is normal size. Normal size ascending aorta. The IVC is normal  in size and reactivity with respiration, suggesting normal central venous  pressure.     Pericardium  Trivial pericardial effusion.      Rhythm  The rhythm was normal sinus.     _____________________________________________________________________________  __  MMode/2D Measurements & Calculations  IVSd: 1.3 cm  LVIDd: 3.9 cm  LVIDs: 2.7 cm  LVPWd: 1.2 cm  FS: 32.4 %  EDV(Teich): 67.1 ml  ESV(Teich): 25.9 ml  LV mass(C)d: 169.3 grams  LV mass(C)dI: 96.6 grams/m2  Ao root diam: 3.1 cm  LA dimension: 3.5 cm     asc Aorta Diam: 3.5 cm  LA/Ao: 1.1        Doppler Measurements & Calculations  MV E max kierra: 84.4 cm/sec  MV A max kierra: 105.3 cm/sec  MV E/A: 0.80  MV dec time: 0.36 sec  PA acc time: 0.11 sec  TR max kierra: 240.1 cm/sec  TR max P.1 mmHg  Lateral E/e': 9.3  Medial E/e': 13.6              _____________________________________________________________________________  __        Report approved by: Jasmeet Varela 2017 04:45 PM            No orders of the defined types were placed in this encounter.    No orders of the defined types were placed in this encounter.    There are no discontinued medications.      Encounter Diagnosis   Name Primary?     Atrial fibrillation (H)          LUIS Webb, APRN CNP  [address]  [place] [address]

## 2018-04-17 NOTE — PROGRESS NOTES
Service Date: 2018      REASON FOR VISIT:  Evaluation of AV block.      HISTORY OF PRESENT ILLNESS:  Ms. Castillo is a delightful 82-year-old lady with a history of hypertension and symptomatic second-degree AV block, who underwent pacemaker implantation on 2017 and is here for routine followup.      At the moment, she is doing well.  She denies any symptoms during this visit such as chest pain, shortness of breath, lightheadedness, near-syncope or syncopal episode.      Since the device was implanted, she was found to have brief runs of atrial arrhythmia/AFib-flutter.  The longest episode she ever had lasted for 6 minutes.  After discussing the indication for anticoagulation, the length of the arrhythmia, a decision was made not to start anticoagulation, especially as the patient is under chemotherapy for myeloma.      Device was interrogated on .  She is atrially paced 55% of the time.  She had 10 mode switches, the longest one lasting for 3 minutes and 22 seconds.      Echocardiogram obtained in 2017 revealed EF of 60%-65% with mild to moderate MR.      ASSESSMENT AND PLAN:   1.  Device care.  Continue device checks every 3 months.   2.  Hypertension.  Blood pressure is well controlled.  Continue current therapy with lisinopril.   3.  Paroxysmal AFib, asymptomatic.  Her CHADS-VASc score is 4.  We had an extensive discussion.  Due to the length of the atrial arrhythmia and the fact that she is currently under chemotherapy for myeloma, I do not think there is justification to start blood thinners at this time.  Continue baby aspirin.         SAM HOLM MD             D: 2018   T: 2018   MT: MELL      Name:     DUGLAS CASTILLO   MRN:      [id number]        Account:      [id number]   :      1935           Service Date: 2018      Document: [id number]

## 2018-04-17 NOTE — MR AVS SNAPSHOT
After Visit Summary   4/17/2018    Quiana Dunaway    MRN: [id number]           Patient Information     Date Of Birth          1935        Visit Information        Provider Department      4/17/2018 2:15 PM Alberto Worthington MD [facility]   [place]        Today's Diagnoses     Cardiac pacemaker in situ    -  1    Paroxysmal atrial fibrillation (H)           Follow-ups after your visit        Your next 10 appointments already scheduled     Apr 23, 2018  2:00 PM CDT   Level 2 with SH INFUSION CHAIR 12   [facility] ([facility])    [facility]  [address]  [place] [address]   [phone]            Apr 30, 2018  2:00 PM CDT   Level 2 with SH INFUSION CHAIR 10   [facility] ([facility])    [facility]  [address]  [place] [address] [phone]            May 07, 2018  1:40 PM CDT   Return Visit with Alex Parry MD   [facility] ([facility])    [facility]  [address]  [place] [address] [phone]            May 07, 2018  2:00 PM CDT   Level 2 with  INFUSION CHAIR 7   [facility] ([facility])    [facility]  [address]  [place] [address] [phone]            May 14, 2018  1:00 PM CDT   Return Visit with Leona Rodriguez PA-C   [facility] [place] ([facility] [place])    [address]  [address]  [place] [address] [phone]            May 14, 2018  2:00 PM CDT   Level 2 with SH INFUSION CHAIR 8   [facility] ([facility])    [facility]  [address]  [place] [address]   [phone]            May 21, 2018   2:00 PM CDT   Level 2 with SH INFUSION CHAIR 1   [facility] ([facility])    [facility] [place]  [address]  [place] [address]   [phone]            May 29, 2018  2:00 PM CDT   Level 2 with SH INFUSION CHAIR 9   [facility] ([facility])    [facility] [place] [address]  [place] [address]   [phone]            Jun 04, 2018  1:40 PM CDT   Return Visit with Alex Parry MD   [facility])    [facility] [place]  [address]  [place] [address]   [phone]            Jun 04, 2018  2:00 PM CDT   Level 2 with SH INFUSION CHAIR 11   [facility] ([facility])    [facility] [place] [address]  [place] [address]   [phone]              Future tests that were ordered for you today     Open Future Orders        Priority Expected Expires Ordered    Follow-Up with Device Clinic Routine 7/16/2018 4/16/2019 4/16/2018            Who to contact     If you have questions or need follow up information about today's clinic visit or your schedule please contact [facility] directly at [phone].  Normal or non-critical lab and imaging results will be communicated to you by ticcklehart, letter or phone within 4 business days after the clinic has received the results. If you do not hear from us within 7 days, please contact the clinic through ticcklehart or phone. If you have a critical or abnormal lab result, we will notify you by phone as soon as possible.  Submit refill requests through Monaeo or call your pharmacy and they will forward the refill request to us. Please allow 3 business days for your refill to be completed.          Additional Information About Your Visit        [facility]  "Information     Paige gives you secure access to your electronic health record. If you see a primary care provider, you can also send messages to your care team and make appointments. If you have questions, please call your primary care clinic.  If you do not have a primary care provider, please call [phone] and they will assist you.        Care EveryWhere ID     This is your Care EveryWhere ID. This could be used by other organizations to access your [facility] medical records  [id number]        Your Vitals Were     Pulse Height BMI (Body Mass Index)             76 1.575 m (5' 2\") 27.8 kg/m2          Blood Pressure from Last 3 Encounters:   04/17/18 140/74   04/16/18 116/64   04/09/18 (!) 122/95    Weight from Last 3 Encounters:   04/17/18 68.9 kg (152 lb)   04/16/18 69.8 kg (153 lb 12.8 oz)   04/09/18 70.3 kg (155 lb)              We Performed the Following     Follow-Up with Electrophysiologist        Primary Care Provider Office Phone # Fax #    César Chung [phone]       [facility] [address]  [facility] [address]        Equal Access to Services     LILLIANA SANDOVAL : Hadii aad ku hadasho Soomaali, waaxda luqadaha, qaybta kaalmada adeegyada, waxay idiin haymarnien carlos rodriguez . So [facility] [phone].    ATENCIÓN: Si habla español, tiene a contreras disposición servicios gratuitos de asistencia lingüística. Llame al [phone].    We comply with applicable federal civil rights laws and [place] laws. We do not discriminate on the basis of race, color, national origin, age, disability, sex, sexual orientation, or gender identity.            Thank you!     Thank you for choosing [facility]  for your care. Our goal is always to provide you with excellent care. Hearing back from our patients is one way we can continue to improve our services. Please take a few minutes to complete the written survey that you may receive in the " mail after your visit with us. Thank you!             Your Updated Medication List - Protect others around you: Learn how to safely use, store and throw away your medicines at www.disposemymeds.org.          This list is accurate as of 4/17/18  3:17 PM.  Always use your most recent med list.                   Brand Name Dispense Instructions for use Diagnosis    acyclovir 400 MG tablet    ZOVIRAX    60 tablet    Take 1 tablet (400 mg) by mouth 2 times daily Viral Prophylaxis.    Multiple myeloma not having achieved remission (H)       aspirin 81 MG chewable tablet      Take 81 mg by mouth daily        calcium carbonate-vitamin D 600-400 MG-UNIT Chew     180 tablet    Take 1 chew tab by mouth 2 times daily    Multiple myeloma not having achieved remission (H)       * dexamethasone 4 MG tablet    DECADRON    30 tablet    Take 10 tablets (40 mg) by mouth every 7 days Days 1, 8, and 15.    Multiple myeloma not having achieved remission (H)       * dexamethasone 4 MG tablet    DECADRON    30 tablet    Take 10 tablets (40 mg) by mouth every 7 days for 3 doses Days 1, 8, and 15.    Multiple myeloma not having achieved remission (H)       escitalopram 5 MG tablet    LEXAPRO    90 tablet    TAKE 1 TABLET BY MOUTH DAILY    ROBER (generalized anxiety disorder)       IMODIUM A-D PO      Take by mouth as needed        LENalidomide 10 MG Caps capsule CHEMO    REVLIMID    14 capsule    Take 1 capsule (10 mg) by mouth daily for 14 days Days 1 through 14 then off for 7 days.    Multiple myeloma not having achieved remission (H)       lisinopril 10 MG tablet    PRINIVIL/ZESTRIL    90 tablet    Take 1 tablet (10 mg) by mouth daily    Benign essential hypertension       LORazepam 0.5 MG tablet    ATIVAN    10 tablet    Take 1 tablet (0.5 mg) by mouth every 8 hours as needed (Anxiety, Nausea/Vomiting or Sleep)    Multiple myeloma not having achieved remission (H)       nitroGLYcerin 0.4 MG sublingual tablet    NITROSTAT    25 tablet     For chest pain place 1 tablet under the tongue every 5 minutes for 3 doses. If symptoms persist 5 minutes after 1st dose call 911.    Atypical chest pain       * order for DME     1 Units    Dispense one 4 wheeled walker with hand brakes and seat    Multiple myeloma not having achieved remission (H)       * order for DME     1 Units    Wheelchair.    Need for assistance due to unsteady gait       prochlorperazine 10 MG tablet    COMPAZINE    30 tablet    Take 1 tablet (10 mg) by mouth every 6 hours as needed (Nausea/Vomiting)    Multiple myeloma not having achieved remission (H)       tolterodine 4 MG 24 hr capsule    DETROL LA    90 capsule    Take 1 capsule (4 mg) by mouth daily    Urinary frequency       * Notice:  This list has 4 medication(s) that are the same as other medications prescribed for you. Read the directions carefully, and ask your doctor or other care provider to review them with you.

## 2018-04-17 NOTE — LETTER
4/17/2018      César Chung MD  [facility] [address]  [facility] [address]      RE: Quiana Dunaway       Dear Colleague,    I had the pleasure of seeing Quiana Dunaway in the [facility].    Service Date: 04/17/2018      REASON FOR VISIT:  Evaluation of AV block.      HISTORY OF PRESENT ILLNESS:  Ms. Dunaway is a delightful 82-year-old lady with a history of hypertension and symptomatic second-degree AV block who underwent pacemaker implantation on 03/27/2017 and is here for routine followup.      At the moment, she is doing well.  She denies any symptoms during this visit such as chest pain, shortness of breath, lightheadedness, near-syncope or syncopal episode.      Since the device was implanted, she was found to have brief runs of atrial arrhythmia/AFib-flutter.  Anticoagulation was not started, as the longest episode she ever had lasted for 6 minutes.  After discussing the indication for anticoagulation, the length of the arrhythmia, a decision was made not to start anticoagulation, especially as the patient is under chemotherapy for myeloma.      Device was interrogated on 04/16.  She is atrially paced 55% of the time.  She had 10 mode switches, the longest one lasting for 3 minutes and 22 seconds.      Echocardiogram obtained in 07/2017 revealed EF of 60%-65% with mild to moderate MR.      ASSESSMENT AND PLAN:   1.  Device care.  Continue device checks every 3 months.   2.  Hypertension.  Blood pressure is well controlled.  Continue current therapy with lisinopril.   3.  Paroxysmal AFib, asymptomatic.  Her CHADS-VASc score is 4.  We had an extensive discussion.  Due to the length of the atrial arrhythmia and the fact that she is currently under chemotherapy for myeloma, I do not think there is justification to start blood thinners at this time.  Continue baby aspirin.         SAM HOLM MD             D: 04/17/2018   T: 04/17/2018   MT:  LJ      Name:     DUGLAS CASTILLO   MRN:      [id number]        Account:      [id number]   :      1935           Service Date: 2018      Document: [id number]         Outpatient Encounter Prescriptions as of 2018   Medication Sig Dispense Refill     acyclovir (ZOVIRAX) 400 MG tablet Take 1 tablet (400 mg) by mouth 2 times daily Viral Prophylaxis. 60 tablet 5     aspirin 81 MG chewable tablet Take 81 mg by mouth daily        calcium carbonate-vitamin D 600-400 MG-UNIT CHEW Take 1 chew tab by mouth 2 times daily 180 tablet 3     dexamethasone (DECADRON) 4 MG tablet Take 10 tablets (40 mg) by mouth every 7 days Days 1, 8, and 15. 30 tablet 0     dexamethasone (DECADRON) 4 MG tablet Take 10 tablets (40 mg) by mouth every 7 days for 3 doses Days 1, 8, and 15. 30 tablet 0     escitalopram (LEXAPRO) 5 MG tablet TAKE 1 TABLET BY MOUTH DAILY 90 tablet 1     LENalidomide (REVLIMID) 10 MG CAPS capsule CHEMO Take 1 capsule (10 mg) by mouth daily for 14 days Days 1 through 14 then off for 7 days. 14 capsule 0     lisinopril (PRINIVIL/ZESTRIL) 10 MG tablet Take 1 tablet (10 mg) by mouth daily 90 tablet 3     Loperamide HCl (IMODIUM A-D PO) Take by mouth as needed       LORazepam (ATIVAN) 0.5 MG tablet Take 1 tablet (0.5 mg) by mouth every 8 hours as needed (Anxiety, Nausea/Vomiting or Sleep) 10 tablet 3     nitroglycerin (NITROSTAT) 0.4 MG sublingual tablet For chest pain place 1 tablet under the tongue every 5 minutes for 3 doses. If symptoms persist 5 minutes after 1st dose call 911. 25 tablet 0     order for DME Dispense one 4 wheeled walker with hand brakes and seat 1 Units 0     order for DME Wheelchair. 1 Units 0     prochlorperazine (COMPAZINE) 10 MG tablet Take 1 tablet (10 mg) by mouth every 6 hours as needed (Nausea/Vomiting) 30 tablet 3     tolterodine (DETROL LA) 4 MG 24 hr capsule Take 1 capsule (4 mg) by mouth daily 90 capsule 3     No facility-administered encounter medications on file as of  4/17/2018.        Again, thank you for allowing me to participate in the care of your patient.      Sincerely,    Alberto Worthington MD     [facility]

## 2018-04-23 ENCOUNTER — INFUSION THERAPY VISIT (OUTPATIENT)
Dept: INFUSION THERAPY | Facility: CLINIC | Age: 83
End: 2018-04-23
Attending: INTERNAL MEDICINE
Payer: MEDICARE

## 2018-04-23 ENCOUNTER — HOSPITAL ENCOUNTER (OUTPATIENT)
Facility: CLINIC | Age: 83
Setting detail: SPECIMEN
Discharge: HOME | End: 2018-04-23
Attending: INTERNAL MEDICINE | Admitting: INTERNAL MEDICINE
Payer: MEDICARE

## 2018-04-23 VITALS
DIASTOLIC BLOOD PRESSURE: 61 MMHG | OXYGEN SATURATION: 94 % | WEIGHT: 155 LBS | RESPIRATION RATE: 16 BRPM | HEIGHT: 62 IN | TEMPERATURE: 98.4 F | BODY MASS INDEX: 28.52 KG/M2 | HEART RATE: 68 BPM | SYSTOLIC BLOOD PRESSURE: 112 MMHG

## 2018-04-23 DIAGNOSIS — C90.00 MULTIPLE MYELOMA NOT HAVING ACHIEVED REMISSION (H): Primary | ICD-10-CM

## 2018-04-23 LAB
BASOPHILS # BLD AUTO: 0 10E9/L (ref 0–0.2)
BASOPHILS NFR BLD AUTO: 0 %
DIFFERENTIAL METHOD BLD: ABNORMAL
EOSINOPHIL # BLD AUTO: 0.4 10E9/L (ref 0–0.7)
EOSINOPHIL NFR BLD AUTO: 7 %
ERYTHROCYTE [DISTWIDTH] IN BLOOD BY AUTOMATED COUNT: 13.4 % (ref 10–15)
HCT VFR BLD AUTO: 35.1 % (ref 35–47)
HGB BLD-MCNC: 11.9 G/DL (ref 11.7–15.7)
LYMPHOCYTES # BLD AUTO: 1.7 10E9/L (ref 0.8–5.3)
LYMPHOCYTES NFR BLD AUTO: 31 %
MCH RBC QN AUTO: 29.5 PG (ref 26.5–33)
MCHC RBC AUTO-ENTMCNC: 33.9 G/DL (ref 31.5–36.5)
MCV RBC AUTO: 87 FL (ref 78–100)
MONOCYTES # BLD AUTO: 0.5 10E9/L (ref 0–1.3)
MONOCYTES NFR BLD AUTO: 9 %
NEUTROPHILS # BLD AUTO: 3 10E9/L (ref 1.6–8.3)
NEUTROPHILS NFR BLD AUTO: 53 %
OVALOCYTES BLD QL SMEAR: SLIGHT
PLATELET # BLD AUTO: 127 10E9/L (ref 150–450)
PLATELET # BLD EST: ABNORMAL 10*3/UL
RBC # BLD AUTO: 4.03 10E12/L (ref 3.8–5.2)
WBC # BLD AUTO: 5.6 10E9/L (ref 4–11)

## 2018-04-23 PROCEDURE — 96401 CHEMO ANTI-NEOPL SQ/IM: CPT

## 2018-04-23 PROCEDURE — 36415 COLL VENOUS BLD VENIPUNCTURE: CPT

## 2018-04-23 PROCEDURE — 85025 COMPLETE CBC W/AUTO DIFF WBC: CPT | Performed by: INTERNAL MEDICINE

## 2018-04-23 PROCEDURE — 25000128 H RX IP 250 OP 636: Performed by: INTERNAL MEDICINE

## 2018-04-23 RX ADMIN — BORTEZOMIB 2.7 MG: 3.5 INJECTION, POWDER, LYOPHILIZED, FOR SOLUTION INTRAVENOUS; SUBCUTANEOUS at 15:56

## 2018-04-23 ASSESSMENT — PAIN SCALES - GENERAL: PAINLEVEL: NO PAIN (0)

## 2018-04-23 NOTE — PROGRESS NOTES
Infusion Nursing Note:  Quiana Dunaway presents today for D8C12 Velcade.    Patient seen by provider today: No   present during visit today: Not Applicable.    Note: Only change to report is development of ?growth in upper gum line near front teeth. I will message  to assess next time he sees her. Pt. Has appt with dentist tomorrow regarding this.  Intravenous Access:  Lab draw site right AC, Needle type butterfly, Gauge 23.  Labs drawn without difficulty.    Treatment Conditions:  Lab Results   Component Value Date    HGB 11.9 04/23/2018     Lab Results   Component Value Date    WBC 5.6 04/23/2018      Lab Results   Component Value Date    ANEU 3.0 04/23/2018     Lab Results   Component Value Date     04/23/2018      Lab Results   Component Value Date     04/16/2018                   Lab Results   Component Value Date    POTASSIUM 3.5 04/16/2018           No results found for: MAG         Lab Results   Component Value Date    CR 0.67 04/16/2018                   Lab Results   Component Value Date    HUMBERTO 8.5 04/16/2018                Lab Results   Component Value Date    BILITOTAL 0.6 04/16/2018           Lab Results   Component Value Date    ALBUMIN 3.3 04/16/2018                    Lab Results   Component Value Date    ALT 16 04/16/2018           Lab Results   Component Value Date    AST 14 04/16/2018       Results reviewed, labs MET treatment parameters, ok to proceed with treatment. Brought home supply of oral Decadron to take now.      Post Infusion Assessment:  Patient tolerated injection without incident.    Discharge Plan:   Discharge instructions reviewed with: Patient.  Patient and/or family verbalized understanding of discharge instructions and all questions answered.  Copy of AVS reviewed with patient and/or family.  Patient will return 4/30 for next appointment.  Patient discharged in stable condition accompanied by: daughter.  Departure Mode: Ambulatory.    June B.  FAUSTINO Cruz

## 2018-04-23 NOTE — MR AVS SNAPSHOT
After Visit Summary   4/23/2018    Quiana Dunaway    MRN: [id number]           Patient Information     Date Of Birth          1935        Visit Information        Provider Department      4/23/2018 2:00 PM  INFUSION CHAIR 12 [facility]        Today's Diagnoses     Multiple myeloma not having achieved remission (H)    -  1       Follow-ups after your visit        Your next 10 appointments already scheduled     Apr 30, 2018  2:00 PM CDT   Level 2 with SH INFUSION CHAIR 10   [facility] ([facility])    [facility] [place] [id number]  [place] [address]   [phone]            May 07, 2018  1:40 PM CDT   Return Visit with Alex Parry MD   [facility] ([facility])    [facility]  [address]  [place] [address]   [phone]            May 07, 2018  2:00 PM CDT   Level 2 with  INFUSION CHAIR 7   [facility] ([facility])    [facility] [place] [id number]  [place] [address]   [phone]            May 14, 2018  1:00 PM CDT   Return Visit with Leona Rodriguez PA-C   [facility] [place] ([facility] [place])    [address]  [address]  [place] [address]   [phone]            May 14, 2018  2:00 PM CDT   Level 2 with  INFUSION CHAIR 8   [facility] ([facility])    [facility]  [address]  [place] [address]   [phone]            May 22, 2018  1:30 PM CDT   Level 2 with  INFUSION CHAIR 8   [facility] ([facility])    [facility]  [address]  [place] [address]   [phone]            May 29, 2018  2:00 PM CDT   Level 2 with  SH INFUSION CHAIR 9   [facility] ([facility])    [facility]  [address]  [place] [address]   [phone]            Jun 04, 2018  1:40 PM CDT   Return Visit with Alex Parry MD   [facility] ([facility])    [facility]  [address]  [place] [address]   [phone]            Jun 04, 2018  2:00 PM CDT   Level 2 with SH INFUSION CHAIR 11   [facility] ([facility])    [facility]  [address]  [place] [address]   [phone]            Jun 11, 2018  2:00 PM CDT   Level 2 with SH INFUSION CHAIR 9   [facility] ([facility])    [place] [id number]  [place] [address] [phone]              Who to contact     If you have questions or need follow up information about today's clinic visit or your schedule please contact [facility] directly at [phone].  Normal or non-critical lab and imaging results will be communicated to you by fastDovehart, letter or phone within 4 business days after the clinic has received the results. If you do not hear from us within 7 days, please contact the clinic through fastDovehart or phone. If you have a critical or abnormal lab result, we will notify you by phone as soon as possible.  Submit refill requests through R&V or call your pharmacy and they will forward the refill request to us. Please allow 3 business days for your refill to be completed.          Additional Information About Your Visit        R&V Information     R&V gives you secure access to your electronic health record. If you see a primary care provider, you can also send messages to your care team and make appointments. If you have questions, please call your  "primary care clinic.  If you do not have a primary care provider, please call [phone] and they will assist you.        Care EveryWhere ID     This is your Care EveryWhere ID. This could be used by other organizations to access your [place] medical records  [id number]        Your Vitals Were     Pulse Temperature Respirations Height Pulse Oximetry BMI (Body Mass Index)    68 98.4  F (36.9  C) (Oral) 16 1.575 m (5' 2\") 94% 28.35 kg/m2       Blood Pressure from Last 3 Encounters:   04/23/18 112/61   04/17/18 140/74   04/16/18 116/64    Weight from Last 3 Encounters:   04/23/18 70.3 kg (155 lb)   04/17/18 68.9 kg (152 lb)   04/16/18 69.8 kg (153 lb 12.8 oz)              We Performed the Following     CBC with platelets differential        Primary Care Provider Office Phone # Fax #    César GISELE Chung [phone]       [facility] [address] [place] [address] [facility] [address]  [facility] [address]        Equal Access to Services     [facility] HospitalTHERESA : Hadii aad ku hadasho Soterrieali, waaxda luqadaha, qaybta kaalmada adedenzelyabertha, phoenix rodriguez . So [facility] [phone].    ATENCIÓN: Si habla español, tiene a contreras disposición servicios gratuitos de asistencia lingüística. [facility] [phone].    We comply with applicable federal civil rights laws and [place] laws. We do not discriminate on the basis of race, color, national origin, age, disability, sex, sexual orientation, or gender identity.            Thank you!     Thank you for choosing [facility] AND [facility]  for your care. Our goal is always to provide you with excellent care. Hearing back from our patients is one way we can continue to improve our services. Please take a few minutes to complete the written survey that you may receive in the mail after your visit with us. Thank you!             Your Updated Medication List - Protect others around you: Learn how to safely use, store and throw away your medicines at " www.disposemymeds.org.          This list is accurate as of 4/23/18  4:12 PM.  Always use your most recent med list.                   Brand Name Dispense Instructions for use Diagnosis    acyclovir 400 MG tablet    ZOVIRAX    60 tablet    Take 1 tablet (400 mg) by mouth 2 times daily Viral Prophylaxis.    Multiple myeloma not having achieved remission (H)       aspirin 81 MG chewable tablet      Take 81 mg by mouth daily        calcium carbonate-vitamin D 600-400 MG-UNIT Chew     180 tablet    Take 1 chew tab by mouth 2 times daily    Multiple myeloma not having achieved remission (H)       dexamethasone 4 MG tablet    DECADRON    30 tablet    Take 10 tablets (40 mg) by mouth every 7 days for 3 doses Days 1, 8, and 15.    Multiple myeloma not having achieved remission (H)       escitalopram 5 MG tablet    LEXAPRO    90 tablet    TAKE 1 TABLET BY MOUTH DAILY    ROBER (generalized anxiety disorder)       IMODIUM A-D PO      Take by mouth as needed        LENalidomide 10 MG Caps capsule CHEMO    REVLIMID    14 capsule    Take 1 capsule (10 mg) by mouth daily for 14 days Days 1 through 14 then off for 7 days.    Multiple myeloma not having achieved remission (H)       lisinopril 10 MG tablet    PRINIVIL/ZESTRIL    90 tablet    Take 1 tablet (10 mg) by mouth daily    Benign essential hypertension       LORazepam 0.5 MG tablet    ATIVAN    10 tablet    Take 1 tablet (0.5 mg) by mouth every 8 hours as needed (Anxiety, Nausea/Vomiting or Sleep)    Multiple myeloma not having achieved remission (H)       nitroGLYcerin 0.4 MG sublingual tablet    NITROSTAT    25 tablet    For chest pain place 1 tablet under the tongue every 5 minutes for 3 doses. If symptoms persist 5 minutes after 1st dose call 911.    Atypical chest pain       * order for DME     1 Units    Dispense one 4 wheeled walker with hand brakes and seat    Multiple myeloma not having achieved remission (H)       * order for DME     1 Units    Wheelchair.    Need for  assistance due to unsteady gait       prochlorperazine 10 MG tablet    COMPAZINE    30 tablet    Take 1 tablet (10 mg) by mouth every 6 hours as needed (Nausea/Vomiting)    Multiple myeloma not having achieved remission (H)       tolterodine 4 MG 24 hr capsule    DETROL LA    90 capsule    Take 1 capsule (4 mg) by mouth daily    Urinary frequency       ZOMETA IV           * Notice:  This list has 2 medication(s) that are the same as other medications prescribed for you. Read the directions carefully, and ask your doctor or other care provider to review them with you.

## 2018-04-30 ENCOUNTER — HOSPITAL ENCOUNTER (OUTPATIENT)
Facility: CLINIC | Age: 83
Setting detail: SPECIMEN
Discharge: HOME | End: 2018-04-30
Attending: INTERNAL MEDICINE | Admitting: INTERNAL MEDICINE
Payer: MEDICARE

## 2018-04-30 ENCOUNTER — INFUSION THERAPY VISIT (OUTPATIENT)
Dept: INFUSION THERAPY | Facility: CLINIC | Age: 83
End: 2018-04-30
Attending: INTERNAL MEDICINE
Payer: MEDICARE

## 2018-04-30 VITALS
HEIGHT: 62 IN | DIASTOLIC BLOOD PRESSURE: 67 MMHG | RESPIRATION RATE: 16 BRPM | HEART RATE: 90 BPM | OXYGEN SATURATION: 99 % | TEMPERATURE: 98.3 F | BODY MASS INDEX: 28.6 KG/M2 | WEIGHT: 155.4 LBS | SYSTOLIC BLOOD PRESSURE: 140 MMHG

## 2018-04-30 DIAGNOSIS — C90.00 MULTIPLE MYELOMA NOT HAVING ACHIEVED REMISSION (H): Primary | ICD-10-CM

## 2018-04-30 LAB
BASOPHILS # BLD AUTO: 0 10E9/L (ref 0–0.2)
BASOPHILS NFR BLD AUTO: 0.2 %
DIFFERENTIAL METHOD BLD: ABNORMAL
EOSINOPHIL # BLD AUTO: 0.2 10E9/L (ref 0–0.7)
EOSINOPHIL NFR BLD AUTO: 5.1 %
ERYTHROCYTE [DISTWIDTH] IN BLOOD BY AUTOMATED COUNT: 13.7 % (ref 10–15)
HCT VFR BLD AUTO: 35.7 % (ref 35–47)
HGB BLD-MCNC: 11.9 G/DL (ref 11.7–15.7)
IMM GRANULOCYTES # BLD: 0 10E9/L (ref 0–0.4)
IMM GRANULOCYTES NFR BLD: 0.4 %
LYMPHOCYTES # BLD AUTO: 1.6 10E9/L (ref 0.8–5.3)
LYMPHOCYTES NFR BLD AUTO: 33.4 %
MCH RBC QN AUTO: 29.3 PG (ref 26.5–33)
MCHC RBC AUTO-ENTMCNC: 33.3 G/DL (ref 31.5–36.5)
MCV RBC AUTO: 88 FL (ref 78–100)
MONOCYTES # BLD AUTO: 0.8 10E9/L (ref 0–1.3)
MONOCYTES NFR BLD AUTO: 16.8 %
NEUTROPHILS # BLD AUTO: 2.1 10E9/L (ref 1.6–8.3)
NEUTROPHILS NFR BLD AUTO: 44.1 %
NRBC # BLD AUTO: 0 10*3/UL
NRBC BLD AUTO-RTO: 0 /100
PLATELET # BLD AUTO: 126 10E9/L (ref 150–450)
RBC # BLD AUTO: 4.06 10E12/L (ref 3.8–5.2)
WBC # BLD AUTO: 4.7 10E9/L (ref 4–11)

## 2018-04-30 PROCEDURE — 36415 COLL VENOUS BLD VENIPUNCTURE: CPT

## 2018-04-30 PROCEDURE — 25000128 H RX IP 250 OP 636: Performed by: INTERNAL MEDICINE

## 2018-04-30 PROCEDURE — 85025 COMPLETE CBC W/AUTO DIFF WBC: CPT | Performed by: INTERNAL MEDICINE

## 2018-04-30 PROCEDURE — 96401 CHEMO ANTI-NEOPL SQ/IM: CPT

## 2018-04-30 RX ADMIN — BORTEZOMIB 2.7 MG: 3.5 INJECTION, POWDER, LYOPHILIZED, FOR SOLUTION INTRAVENOUS; SUBCUTANEOUS at 14:45

## 2018-04-30 ASSESSMENT — PAIN SCALES - GENERAL: PAINLEVEL: NO PAIN (0)

## 2018-04-30 NOTE — MR AVS SNAPSHOT
After Visit Summary   4/30/2018    Quiana Dunaway    MRN: [id number]           Patient Information     Date Of Birth          1935        Visit Information        Provider Department      4/30/2018 2:00 PM  INFUSION CHAIR 10 [facility]        Today's Diagnoses     Multiple myeloma not having achieved remission (H)    -  1       Follow-ups after your visit        Your next 10 appointments already scheduled     May 07, 2018  1:15 PM CDT   Return Visit with  Oncology Nurse   [facility] ([facility])    [place] [id number]  [place] [address]   [phone]            May 07, 2018  1:40 PM CDT   Return Visit with Alex Parry MD   [facility] ([facility])    [facility]  [address]  [place] [address]   [phone]            May 07, 2018  2:00 PM CDT   Level 2 with  INFUSION CHAIR 7   [facility] ([facility])    [facility]  [address]  [place] [address]   [phone]            May 14, 2018  1:00 PM CDT   Return Visit with Leona Rodriguez PA-C   [facility] [place] ([facility] [place])    [address]  [place] [address]   [phone]            May 14, 2018  2:00 PM CDT   Return Visit with  Oncology Nurse   [facility] ([facility])    [facility]  [address]  [place] [address]   [phone]            May 14, 2018  3:00 PM CDT   Level 1 with  INFUSION CHAIR 11   [facility] ([facility])    [facility]  [address]  [place] [address]   [phone]            May 22, 2018  1:45 PM CDT   Return Visit with  Oncology Nurse    [facility] ([facility])    [facility]  [address]  [place] [address]   [phone]            May 22, 2018  2:30 PM CDT   Level 1 with SH INFUSION CHAIR 15   [facility] ([facility])    [facility] [place]  [address]  [place] [address]   [phone]            May 29, 2018  2:00 PM CDT   Level 2 with SH INFUSION CHAIR 9   [facility] ([facility])    [facility] [place]  [address]  [place] [address]   [phone]            Jun 04, 2018  2:20 PM CDT   Return Visit with Alex Parry MD   [facility] ([facility])    [facility]  [address]  [place] [address] [phone]              Who to contact     If you have questions or need follow up information about today's clinic visit or your schedule please contact [facility] directly at [phone].  Normal or non-critical lab and imaging results will be communicated to you by Web Africahart, letter or phone within 4 business days after the clinic has received the results. If you do not hear from us within 7 days, please contact the clinic through Web Africahart or phone. If you have a critical or abnormal lab result, we will notify you by phone as soon as possible.  Submit refill requests through PLAYD8 or call your pharmacy and they will forward the refill request to us. Please allow 3 business days for your refill to be completed.          Additional Information About Your Visit        PLAYD8 Information     PLAYD8 gives you secure access to your electronic health record. If you see a primary care provider, you can also send messages to your care team and make appointments. If you have questions, please call your primary care clinic.  If you do not have  "a primary care provider, please call [phone] and they will assist you.        Care EveryWhere ID     This is your Care EveryWhere ID. This could be used by other organizations to access your [place] medical records  [id number]        Your Vitals Were     Pulse Temperature Respirations Height Pulse Oximetry BMI (Body Mass Index)    90 98.3  F (36.8  C) (Oral) 16 1.575 m (5' 2\") 99% 28.42 kg/m2       Blood Pressure from Last 3 Encounters:   04/30/18 140/67   04/23/18 112/61   04/17/18 140/74    Weight from Last 3 Encounters:   04/30/18 70.5 kg (155 lb 6.4 oz)   04/23/18 70.3 kg (155 lb)   04/17/18 68.9 kg (152 lb)              We Performed the Following     CBC with platelets differential        Primary Care Provider Office Phone # Fax #    César GISELE Chung [phone]       [facility] [address] [facility] [address]  [facility] [address]        Equal Access to Services     [facility]: Hadii aad ku hadasho Soomaali, waaxda luqadaha, qaybta kaalmada adeegyada, phoenix rodriguez . So [facility] [phone].    ATENCIÓN: Si habla español, tiene a contreras disposición servicios gratuitos de asistencia lingüística. Chad al [phone].    We comply with applicable federal civil rights laws and [place] laws. We do not discriminate on the basis of race, color, national origin, age, disability, sex, sexual orientation, or gender identity.            Thank you!     Thank you for choosing [facility] AND [facility] CENTER  for your care. Our goal is always to provide you with excellent care. Hearing back from our patients is one way we can continue to improve our services. Please take a few minutes to complete the written survey that you may receive in the mail after your visit with us. Thank you!             Your Updated Medication List - Protect others around you: Learn how to safely use, store and throw away your medicines at www.disposemymeds.org.          This list is " accurate as of 4/30/18  3:25 PM.  Always use your most recent med list.                   Brand Name Dispense Instructions for use Diagnosis    acyclovir 400 MG tablet    ZOVIRAX    60 tablet    Take 1 tablet (400 mg) by mouth 2 times daily Viral Prophylaxis.    Multiple myeloma not having achieved remission (H)       aspirin 81 MG chewable tablet      Take 81 mg by mouth daily        calcium carbonate-vitamin D 600-400 MG-UNIT Chew     180 tablet    Take 1 chew tab by mouth 2 times daily    Multiple myeloma not having achieved remission (H)       dexamethasone 4 MG tablet    DECADRON    30 tablet    Take 10 tablets (40 mg) by mouth every 7 days for 3 doses Days 1, 8, and 15.    Multiple myeloma not having achieved remission (H)       escitalopram 5 MG tablet    LEXAPRO    90 tablet    TAKE 1 TABLET BY MOUTH DAILY    ROBER (generalized anxiety disorder)       IMODIUM A-D PO      Take by mouth as needed        LENalidomide 10 MG Caps capsule CHEMO    REVLIMID    14 capsule    Take 1 capsule (10 mg) by mouth daily for 14 days Days 1 through 14 then off for 7 days.    Multiple myeloma not having achieved remission (H)       lisinopril 10 MG tablet    PRINIVIL/ZESTRIL    90 tablet    Take 1 tablet (10 mg) by mouth daily    Benign essential hypertension       LORazepam 0.5 MG tablet    ATIVAN    10 tablet    Take 1 tablet (0.5 mg) by mouth every 8 hours as needed (Anxiety, Nausea/Vomiting or Sleep)    Multiple myeloma not having achieved remission (H)       nitroGLYcerin 0.4 MG sublingual tablet    NITROSTAT    25 tablet    For chest pain place 1 tablet under the tongue every 5 minutes for 3 doses. If symptoms persist 5 minutes after 1st dose call 911.    Atypical chest pain       * order for DME     1 Units    Dispense one 4 wheeled walker with hand brakes and seat    Multiple myeloma not having achieved remission (H)       * order for DME     1 Units    Wheelchair.    Need for assistance due to unsteady gait        prochlorperazine 10 MG tablet    COMPAZINE    30 tablet    Take 1 tablet (10 mg) by mouth every 6 hours as needed (Nausea/Vomiting)    Multiple myeloma not having achieved remission (H)       tolterodine 4 MG 24 hr capsule    DETROL LA    90 capsule    Take 1 capsule (4 mg) by mouth daily    Urinary frequency       ZOMETA IV           * Notice:  This list has 2 medication(s) that are the same as other medications prescribed for you. Read the directions carefully, and ask your doctor or other care provider to review them with you.

## 2018-04-30 NOTE — PROGRESS NOTES
Infusion Nursing Note:  Quiana Dunaway presents today for D15C12 Velcade.    Patient seen by provider today: No   present during visit today: Not Applicable.    Note: No concerns or changes to report.    Intravenous Access:  Lab draw site right AC, Needle type butterfly, Gauge 23.  Labs drawn without difficulty.    Treatment Conditions:  Lab Results   Component Value Date    HGB 11.9 04/30/2018     Lab Results   Component Value Date    WBC 4.7 04/30/2018      Lab Results   Component Value Date    ANEU 2.1 04/30/2018     Lab Results   Component Value Date     04/30/2018      Results reviewed, labs MET treatment parameters, ok to proceed with treatment.      Post Infusion Assessment:  Patient tolerated injection without incident.    Discharge Plan:   Discharge instructions reviewed with: Patient.  Patient and/or family verbalized understanding of discharge instructions and all questions answered.  Copy of AVS reviewed with patient and/or family.  Patient will return 5/7 for next appointment.  Patient discharged in stable condition accompanied by: daughter.  Departure Mode: Ambulatory.    Cortney Cruz RN

## 2018-05-03 RX ORDER — LENALIDOMIDE 10 MG/1
10 CAPSULE ORAL DAILY
Qty: 21 CAPSULE | Refills: 0 | Status: SHIPPED | OUTPATIENT
Start: 2018-05-03 | End: 2018-12-26

## 2018-05-03 RX ORDER — DEXAMETHASONE 4 MG/1
40 TABLET ORAL WEEKLY
Qty: 40 TABLET | Refills: 0 | Status: SHIPPED | OUTPATIENT
Start: 2018-05-03 | End: 2018-06-04

## 2018-05-04 DIAGNOSIS — C90.00 MULTIPLE MYELOMA NOT HAVING ACHIEVED REMISSION (H): Primary | ICD-10-CM

## 2018-05-07 ENCOUNTER — DOCUMENTATION ONLY (OUTPATIENT)
Dept: PHARMACY | Facility: CLINIC | Age: 83
End: 2018-05-07

## 2018-05-07 ENCOUNTER — HOSPITAL ENCOUNTER (OUTPATIENT)
Facility: CLINIC | Age: 83
Setting detail: SPECIMEN
Discharge: HOME | End: 2018-05-07
Attending: INTERNAL MEDICINE | Admitting: INTERNAL MEDICINE
Payer: MEDICARE

## 2018-05-07 ENCOUNTER — ONCOLOGY VISIT (OUTPATIENT)
Dept: ONCOLOGY | Facility: CLINIC | Age: 83
End: 2018-05-07
Attending: INTERNAL MEDICINE
Payer: MEDICARE

## 2018-05-07 ENCOUNTER — INFUSION THERAPY VISIT (OUTPATIENT)
Dept: INFUSION THERAPY | Facility: CLINIC | Age: 83
End: 2018-05-07
Attending: INTERNAL MEDICINE
Payer: MEDICARE

## 2018-05-07 VITALS
RESPIRATION RATE: 16 BRPM | OXYGEN SATURATION: 95 % | SYSTOLIC BLOOD PRESSURE: 123 MMHG | HEART RATE: 85 BPM | DIASTOLIC BLOOD PRESSURE: 57 MMHG | TEMPERATURE: 98.8 F | BODY MASS INDEX: 28.35 KG/M2 | WEIGHT: 155 LBS

## 2018-05-07 DIAGNOSIS — C90.00 MULTIPLE MYELOMA NOT HAVING ACHIEVED REMISSION (H): Primary | ICD-10-CM

## 2018-05-07 DIAGNOSIS — C90.00 MULTIPLE MYELOMA NOT HAVING ACHIEVED REMISSION (H): ICD-10-CM

## 2018-05-07 LAB
ALBUMIN SERPL-MCNC: 3.7 G/DL (ref 3.4–5)
ALP SERPL-CCNC: 87 U/L (ref 40–150)
ALT SERPL W P-5'-P-CCNC: 21 U/L (ref 0–50)
ANION GAP SERPL CALCULATED.3IONS-SCNC: 4 MMOL/L (ref 3–14)
AST SERPL W P-5'-P-CCNC: 16 U/L (ref 0–45)
BASOPHILS # BLD AUTO: 0 10E9/L (ref 0–0.2)
BASOPHILS NFR BLD AUTO: 0.7 %
BILIRUB SERPL-MCNC: 0.8 MG/DL (ref 0.2–1.3)
BUN SERPL-MCNC: 18 MG/DL (ref 7–30)
CALCIUM SERPL-MCNC: 8.4 MG/DL (ref 8.5–10.1)
CHLORIDE SERPL-SCNC: 110 MMOL/L (ref 94–109)
CO2 SERPL-SCNC: 27 MMOL/L (ref 20–32)
CREAT SERPL-MCNC: 0.58 MG/DL (ref 0.52–1.04)
DIFFERENTIAL METHOD BLD: NORMAL
EOSINOPHIL # BLD AUTO: 0.3 10E9/L (ref 0–0.7)
EOSINOPHIL NFR BLD AUTO: 4.2 %
ERYTHROCYTE [DISTWIDTH] IN BLOOD BY AUTOMATED COUNT: 14 % (ref 10–15)
GFR SERPL CREATININE-BSD FRML MDRD: >90 ML/MIN/1.7M2
GLUCOSE SERPL-MCNC: 124 MG/DL (ref 70–99)
HCT VFR BLD AUTO: 36.8 % (ref 35–47)
HGB BLD-MCNC: 12.3 G/DL (ref 11.7–15.7)
IMM GRANULOCYTES # BLD: 0 10E9/L (ref 0–0.4)
IMM GRANULOCYTES NFR BLD: 0.5 %
LYMPHOCYTES # BLD AUTO: 1.6 10E9/L (ref 0.8–5.3)
LYMPHOCYTES NFR BLD AUTO: 26.8 %
MCH RBC QN AUTO: 29.4 PG (ref 26.5–33)
MCHC RBC AUTO-ENTMCNC: 33.4 G/DL (ref 31.5–36.5)
MCV RBC AUTO: 88 FL (ref 78–100)
MONOCYTES # BLD AUTO: 1 10E9/L (ref 0–1.3)
MONOCYTES NFR BLD AUTO: 17.5 %
NEUTROPHILS # BLD AUTO: 3 10E9/L (ref 1.6–8.3)
NEUTROPHILS NFR BLD AUTO: 50.3 %
NRBC # BLD AUTO: 0 10*3/UL
NRBC BLD AUTO-RTO: 0 /100
PLATELET # BLD AUTO: 176 10E9/L (ref 150–450)
POTASSIUM SERPL-SCNC: 3.5 MMOL/L (ref 3.4–5.3)
PROT SERPL-MCNC: 6.6 G/DL (ref 6.8–8.8)
RBC # BLD AUTO: 4.18 10E12/L (ref 3.8–5.2)
SODIUM SERPL-SCNC: 141 MMOL/L (ref 133–144)
WBC # BLD AUTO: 5.9 10E9/L (ref 4–11)

## 2018-05-07 PROCEDURE — 36415 COLL VENOUS BLD VENIPUNCTURE: CPT

## 2018-05-07 PROCEDURE — 25000128 H RX IP 250 OP 636: Performed by: INTERNAL MEDICINE

## 2018-05-07 PROCEDURE — 84165 PROTEIN E-PHORESIS SERUM: CPT | Performed by: INTERNAL MEDICINE

## 2018-05-07 PROCEDURE — 82784 ASSAY IGA/IGD/IGG/IGM EACH: CPT | Performed by: INTERNAL MEDICINE

## 2018-05-07 PROCEDURE — 85025 COMPLETE CBC W/AUTO DIFF WBC: CPT | Performed by: INTERNAL MEDICINE

## 2018-05-07 PROCEDURE — 80053 COMPREHEN METABOLIC PANEL: CPT | Performed by: INTERNAL MEDICINE

## 2018-05-07 PROCEDURE — 83883 ASSAY NEPHELOMETRY NOT SPEC: CPT | Performed by: INTERNAL MEDICINE

## 2018-05-07 PROCEDURE — 96374 THER/PROPH/DIAG INJ IV PUSH: CPT

## 2018-05-07 PROCEDURE — 00000402 ZZHCL STATISTIC TOTAL PROTEIN: Performed by: INTERNAL MEDICINE

## 2018-05-07 PROCEDURE — 99213 OFFICE O/P EST LOW 20 MIN: CPT | Performed by: INTERNAL MEDICINE

## 2018-05-07 RX ORDER — ZOLEDRONIC ACID 0.04 MG/ML
4 INJECTION, SOLUTION INTRAVENOUS ONCE
Status: COMPLETED | OUTPATIENT
Start: 2018-05-07 | End: 2018-05-07

## 2018-05-07 RX ORDER — ZOLEDRONIC ACID 0.04 MG/ML
4 INJECTION, SOLUTION INTRAVENOUS ONCE
Status: CANCELLED | OUTPATIENT
Start: 2018-05-07 | End: 2018-05-07

## 2018-05-07 RX ADMIN — ZOLEDRONIC ACID 4 MG: 0.04 INJECTION, SOLUTION INTRAVENOUS at 14:32

## 2018-05-07 RX ADMIN — SODIUM CHLORIDE 250 ML: 9 INJECTION, SOLUTION INTRAVENOUS at 14:30

## 2018-05-07 ASSESSMENT — PAIN SCALES - GENERAL: PAINLEVEL: NO PAIN (0)

## 2018-05-07 NOTE — MR AVS SNAPSHOT
After Visit Summary   5/7/2018    Quiana Dunaway    MRN: [id number]           Patient Information     Date Of Birth          1935        Visit Information        Provider Department      5/7/2018 1:15 PM Nurse,  Oncology [facility]        Today's Diagnoses     Multiple myeloma not having achieved remission (H)           Follow-ups after your visit        Your next 10 appointments already scheduled     May 07, 2018  1:40 PM CDT   Return Visit with Alex Parry MD   [facility] ([facility])    [facility]  [address]  [place] [address]   [phone]            May 07, 2018  2:00 PM CDT   Level 2 with  INFUSION CHAIR 7   [facility] ([facility])    [facility]  [address]  [place] [address]   [phone]            May 14, 2018  1:00 PM CDT   Return Visit with Leona Rodriguez PA-C   [facility] [place] ([facility] [place])    [address]  [address]  [place] [address] [phone]            Jun 04, 2018  1:30 PM CDT   Return Visit with  Oncology Nurse   [facility] ([facility])    [facility]  [address]  [place] [address]   [phone]            Jun 04, 2018  2:20 PM CDT   Return Visit with Alex Parry MD   [facility] ([facility])    [facility]  [address]  [place] [address]   [phone]            Jun 18, 2018 12:30 PM CDT   Office Visit with César Chung MD   [facility] ([facility])    [address]  [place] [address] [phone]           Bring a current list of meds and any records pertaining to this visit. For Physicals, please bring immunization records and any forms needing to be filled out.  Please arrive 10 minutes early to complete paperwork.              Who to contact     If you have questions or need follow up information about today's clinic visit or your schedule please contact [facility] directly at [phone].  Normal or non-critical lab and imaging results will be communicated to you by MyChart, letter or phone within 4 business days after the clinic has received the results. If you do not hear from us within 7 days, please contact the clinic through MyChart or phone. If you have a critical or abnormal lab result, we will notify you by phone as soon as possible.  Submit refill requests through [employer] or call your pharmacy and they will forward the refill request to us. Please allow 3 business days for your refill to be completed.          Additional Information About Your Visit        [employer] Information     [employer] gives you secure access to your electronic health record. If you see a primary care provider, you can also send messages to your care team and make appointments. If you have questions, please call your primary care clinic.  If you do not have a primary care provider, please call [phone] and they will assist you.        Care EveryWhere ID     This is your Care EveryWhere ID. This could be used by other organizations to access your [facility] medical records  [id number]         Blood Pressure from Last 3 Encounters:   04/30/18 140/67   04/23/18 112/61   04/17/18 140/74    Weight from Last 3 Encounters:   04/30/18 70.5 kg (155 lb 6.4 oz)   04/23/18 70.3 kg (155 lb)   04/17/18 68.9 kg (152 lb)              We Performed the Following     **Comprehensive metabolic panel FUTURE anytime     CBC with platelets and differential        Primary Care Provider Office Phone # Fax #    César Chung [phone] [address]  [place] [address]        Equal Access to Services     LILLIANA SANDOVAL : chance Yañez,  nelda baptiste aureliophoenix pratt ah. So [facility] [phone].    ATENCIÓN: Si ankur nascimento, tiene a contreras disposición servicios gratuitos de asistencia lingüística. Chad al [phone].    We comply with applicable federal civil rights laws and [place] laws. We do not discriminate on the basis of race, color, national origin, age, disability, sex, sexual orientation, or gender identity.            Thank you!     Thank you for choosing [facility]  for your care. Our goal is always to provide you with excellent care. Hearing back from our patients is one way we can continue to improve our services. Please take a few minutes to complete the written survey that you may receive in the mail after your visit with us. Thank you!             Your Updated Medication List - Protect others around you: Learn how to safely use, store and throw away your medicines at www.disposemymeds.org.          This list is accurate as of 5/7/18  1:29 PM.  Always use your most recent med list.                   Brand Name Dispense Instructions for use Diagnosis    acyclovir 400 MG tablet    ZOVIRAX    60 tablet    Take 1 tablet (400 mg) by mouth 2 times daily Viral Prophylaxis.    Multiple myeloma not having achieved remission (H)       aspirin 81 MG chewable tablet      Take 81 mg by mouth daily        calcium carbonate-vitamin D 600-400 MG-UNIT Chew     180 tablet    Take 1 chew tab by mouth 2 times daily    Multiple myeloma not having achieved remission (H)       dexamethasone 4 MG tablet    DECADRON    40 tablet    Take 10 tablets (40 mg) by mouth once a week Once a week with food on Days 1,8,15 and 22.    Multiple myeloma not having achieved remission (H)       escitalopram 5 MG tablet    LEXAPRO    90 tablet    TAKE 1 TABLET BY MOUTH DAILY    ROBER (generalized anxiety disorder)       IMODIUM A-D PO      Take by mouth as needed        LENalidomide 10 MG Caps capsule CHEMO    REVLIMID    21  capsule    Take 1 capsule (10 mg) by mouth daily for 21 days Take on Days 1 through 21 of 28-day cycle.    Multiple myeloma not having achieved remission (H)       lisinopril 10 MG tablet    PRINIVIL/ZESTRIL    90 tablet    Take 1 tablet (10 mg) by mouth daily    Benign essential hypertension       LORazepam 0.5 MG tablet    ATIVAN    10 tablet    Take 1 tablet (0.5 mg) by mouth every 8 hours as needed (Anxiety, Nausea/Vomiting or Sleep)    Multiple myeloma not having achieved remission (H)       nitroGLYcerin 0.4 MG sublingual tablet    NITROSTAT    25 tablet    For chest pain place 1 tablet under the tongue every 5 minutes for 3 doses. If symptoms persist 5 minutes after 1st dose call 911.    Atypical chest pain       * order for DME     1 Units    Dispense one 4 wheeled walker with hand brakes and seat    Multiple myeloma not having achieved remission (H)       * order for DME     1 Units    Wheelchair.    Need for assistance due to unsteady gait       prochlorperazine 10 MG tablet    COMPAZINE    30 tablet    Take 1 tablet (10 mg) by mouth every 6 hours as needed (Nausea/Vomiting)    Multiple myeloma not having achieved remission (H)       tolterodine 4 MG 24 hr capsule    DETROL LA    90 capsule    Take 1 capsule (4 mg) by mouth daily    Urinary frequency       ZOMETA IV           * Notice:  This list has 2 medication(s) that are the same as other medications prescribed for you. Read the directions carefully, and ask your doctor or other care provider to review them with you.

## 2018-05-07 NOTE — PROGRESS NOTES
Visit Date:   05/07/2018     HEMATOLOGY HISTORY: Mrs. Dunaway is a lady with multiple myeloma (IgG kappa). High risk, t(14;16).  1. On 05/01/2017, WBC of 3.4, hemoglobin of 10.2 and platelet of 154.  2. SPEP on 07/07/2017 revealed M-spike of 1.8.  3. Bone marrow biopsy on 07/12/2017 reveals hypercellular bone marrow with kappa monotypic plasma cell population consistent with multiple myeloma.  Bone marrow is 70% cellular.  Plasma cell is 50-60%.     -There is gain of chromosome 1, 5, 9, 15, and 17.  There is translocation 14;16.   4.  On 07/18/2017:  -M-spike of 1.9.   -Immunofixation reveals monoclonal IgG kappa.    -IgG level of 2970.  IgA of 15 and IgM of 175.    -Kappa free light chain of 67.7.  Lambda free light chain of 1.42.  Ratio of kappa to lambda of 47.71.    -Beta 2 microglobulin of 3.4.   5. PET scan on 07/24/2017 reveals several focal areas of increased FDG uptake consistent with multiple myeloma.  There is probable epithelial cyst in tail of the pancreas.  No associated abnormal FDG activity.  Left thyroid cysts or nodules without any FDG activity.  There is a 0.3 cm left upper lobe lung nodule.   8.  Velcade, Revlimid and dexamethasone between on 08/14/2017 and 04/30/2018.  9. Revlimid and dexamethasone started      SUBJECTIVE:    Ms. Dunaway is an 82-year-old female with IgG kappa multiple myeloma.  She received Velcade, Revlimid and dexamethasone between 08/14/2017 and 04/30/2018.  Her disease has responded.  Now, she will be on single agent Revlimid with dexamethasone.      She is here for followup.  Overall, she is doing well.  She has some fatigue.  No headache.  No dizziness.  No chest pain or difficulty breathing.  No abdominal pain, nausea or vomiting.  No bleeding.  No fever, chills or night sweats.  Appetite has been good.  She has some neuropathy from velcade.  She has some numbness and tingling in the fingers and toes.  They are stable.  She can walk well.  Things are not falling out of  her hand.      PHYSICAL EXAMINATION:   GENERAL:  She was alert and oriented x3.   VITAL SIGNS:  Reviewed.     Rest of the systems not examine.      LABORATORY DATA:  Reviewed.      ASSESSMENT:   1.  An 82-year-old female with high risk IgG kappa multiple myeloma.   2.  Peripheral neuropathy which is stable.   3.  Fatigue.      PLAN:   1.  Patient clinically is stable from multiple myeloma.  Labs done on 2018 reveals stable M-spike.  Her kappa free light chain and IgG were just minimally elevated from before.      Discussed with her regarding further treatment.  We will stop the Velcade.  She will continue Revlimid and dexamethasone.  She is agreeable for it.  Hopefully, some of the side effects including fatigue and neuropathy will improve.      2.  I explained to the patient that her myeloma will progress in future.  When it does, we will add other treatment like daratumumab.      3.  She will continue on Zometa for bone health.  She is tolerating it well.  No dental or jaw related problem.  No pain, swelling, infection or fracture.      4.  She had few questions, which were all answered.  I will see her in a month for followup.  Advised her to see a physician sooner if she has worsening weakness, chest pain, shortness of breath, recurrent vomiting or bleeding or any other concerns.         YVES PEREZ MD             D: 2018   T: 2018   MT: RO      Name:     DUGLAS CASTILLO   MRN:      [id number]        Account:      [id number]   :      1935           Visit Date:   2018      Document: [id number]

## 2018-05-07 NOTE — MR AVS SNAPSHOT
After Visit Summary   5/7/2018    Quiana Dunaway    MRN: [id number]           Patient Information     Date Of Birth          1935        Visit Information        Provider Department      5/7/2018 2:00 PM  INFUSION CHAIR 7 [facility]        Today's Diagnoses     Multiple myeloma not having achieved remission (H)    -  1       Follow-ups after your visit        Your next 10 appointments already scheduled     May 14, 2018  1:00 PM CDT   Return Visit with Leona Rodriguez PA-C   [facility] ([facility])    [address]  [address]  [facility] [address]   [phone]            Jun 04, 2018  1:30 PM CDT   Return Visit with  Oncology Nurse   [facility] ([facility]    [facility]  [address]  [facility] [address]   [phone]            Jun 04, 2018  2:20 PM CDT   Return Visit with Alex Parry MD   [facility] ([facility])    [facility]  [address]  [facility] [address]   [phone]            Jun 18, 2018 12:30 PM CDT   Office Visit with César Chung MD   [facility]    [address] [facility] [address]   [phone]           Bring a current list of meds and any records pertaining to this visit. For Physicals, please bring immunization records and any forms needing to be filled out. Please arrive 10 minutes early to complete paperwork.              Future tests that were ordered for you today     Open Standing Orders        Priority Remaining Interval Expires Ordered    Kappa and lambda light chain Routine 1/1 AM DRAW  5/7/2018    Protein electrophoresis Routine 1/1 AM DRAW  5/7/2018    IgG Routine 1/1 AM DRAW  5/7/2018            Who to contact     If you have questions or need follow up information about today's clinic  visit or your schedule please contact [facility] directly at [phone].  Normal or non-critical lab and imaging results will be communicated to you by MyChart, letter or phone within 4 business days after the clinic has received the results. If you do not hear from us within 7 days, please contact the clinic through [employer] or phone. If you have a critical or abnormal lab result, we will notify you by phone as soon as possible.  Submit refill requests through [employer] or call your pharmacy and they will forward the refill request to us. Please allow 3 business days for your refill to be completed.          Additional Information About Your Visit        [employer] Information     [employer] gives you secure access to your electronic health record. If you see a primary care provider, you can also send messages to your care team and make appointments. If you have questions, please call your primary care clinic.  If you do not have a primary care provider, please call [phone] and they will assist you.        Care EveryWhere ID     This is your Care EveryWhere ID. This could be used by other organizations to access your [facility] medical records  [id number]         Blood Pressure from Last 3 Encounters:   05/07/18 123/57   04/30/18 140/67   04/23/18 112/61    Weight from Last 3 Encounters:   05/07/18 70.3 kg (155 lb)   04/30/18 70.5 kg (155 lb 6.4 oz)   04/23/18 70.3 kg (155 lb)              We Performed the Following     IgG     Kappa and lambda light chain     Protein electrophoresis        Primary Care Provider Office Phone # Fax #    César Chung [phone] [address]  [facility] [address]        Equal Access to Services     KEVON [facility] Hospital[facility]ESA : Hadii althea Contreras, chance escobar, qaybphoenix freeman . So [facility] [phone].    ATENCIÓN: Si habla español, tiene a contreras disposición servicios  jassi de asistencia lingüística. Chad gallardo [phone].    We comply with applicable federal civil rights laws and [place] laws. We do not discriminate on the basis of race, color, national origin, age, disability, sex, sexual orientation, or gender identity.            Thank you!     Thank you for choosing [facility]  for your care. Our goal is always to provide you with excellent care. Hearing back from our patients is one way we can continue to improve our services. Please take a few minutes to complete the written survey that you may receive in the mail after your visit with us. Thank you!             Your Updated Medication List - Protect others around you: Learn how to safely use, store and throw away your medicines at www.disposemymeds.org.          This list is accurate as of 5/7/18  5:08 PM.  Always use your most recent med list.                   Brand Name Dispense Instructions for use Diagnosis    acyclovir 400 MG tablet    ZOVIRAX    60 tablet    Take 1 tablet (400 mg) by mouth 2 times daily Viral Prophylaxis.    Multiple myeloma not having achieved remission (H)       aspirin 81 MG chewable tablet      Take 81 mg by mouth daily        calcium carbonate-vitamin D 600-400 MG-UNIT Chew     180 tablet    Take 1 chew tab by mouth 2 times daily    Multiple myeloma not having achieved remission (H)       dexamethasone 4 MG tablet    DECADRON    40 tablet    Take 10 tablets (40 mg) by mouth once a week Once a week with food on Days 1,8,15 and 22.    Multiple myeloma not having achieved remission (H)       escitalopram 5 MG tablet    LEXAPRO    90 tablet    TAKE 1 TABLET BY MOUTH DAILY    ROBER (generalized anxiety disorder)       IMODIUM A-D PO      Take by mouth as needed        LENalidomide 10 MG Caps capsule CHEMO    REVLIMID    21 capsule    Take 1 capsule (10 mg) by mouth daily for 21 days Take on Days 1 through 21 of 28-day cycle.    Multiple myeloma not having  achieved remission (H)       lisinopril 10 MG tablet    PRINIVIL/ZESTRIL    90 tablet    Take 1 tablet (10 mg) by mouth daily    Benign essential hypertension       LORazepam 0.5 MG tablet    ATIVAN    10 tablet    Take 1 tablet (0.5 mg) by mouth every 8 hours as needed (Anxiety, Nausea/Vomiting or Sleep)    Multiple myeloma not having achieved remission (H)       nitroGLYcerin 0.4 MG sublingual tablet    NITROSTAT    25 tablet    For chest pain place 1 tablet under the tongue every 5 minutes for 3 doses. If symptoms persist 5 minutes after 1st dose call 911.    Atypical chest pain       * order for DME     1 Units    Dispense one 4 wheeled walker with hand brakes and seat    Multiple myeloma not having achieved remission (H)       * order for DME     1 Units    Wheelchair.    Need for assistance due to unsteady gait       prochlorperazine 10 MG tablet    COMPAZINE    30 tablet    Take 1 tablet (10 mg) by mouth every 6 hours as needed (Nausea/Vomiting)    Multiple myeloma not having achieved remission (H)       tolterodine 4 MG 24 hr capsule    DETROL LA    90 capsule    Take 1 capsule (4 mg) by mouth daily    Urinary frequency       ZOMETA IV           * Notice:  This list has 2 medication(s) that are the same as other medications prescribed for you. Read the directions carefully, and ask your doctor or other care provider to review them with you.

## 2018-05-07 NOTE — PROGRESS NOTES
Medical Assistant Note:  Quiana Dunaway presents today for lab visit.    Patient seen by provider today: Yes: Dr. Parry.   present during visit today: Not Applicable.    Concerns: No Concerns.    Procedure:  Lab draw site: LAC , Needle type: BF, Gauge: 21 g gauze and coban applied.    Post Assessment:  Labs drawn without difficulty: Yes.    Discharge Plan:  Departure Mode: Ambulatory.    Face to Face Time: 4.    Isabel Sarmiento MA

## 2018-05-07 NOTE — PROGRESS NOTES
Infusion Nursing Note:  Quiana ANA Dunaway presents today for labs, Zometa infusion. Supportive friend present.    Patient seen by provider today: Yes: Dr. Parry   present during visit today: Not Applicable.    Note: No new symptoms/concerns at this time.     Intravenous Access:  Multiple myeloma labs drawn without difficulty (IgG, kappa lambda light chains, protein electrophoresis)  Peripheral IV placed.  Routine labs drawn earlier by MA.    Treatment Conditions:  Lab Results   Component Value Date     05/07/2018                   Lab Results   Component Value Date    POTASSIUM 3.5 05/07/2018           No results found for: MAG         Lab Results   Component Value Date    CR 0.58 05/07/2018                   Lab Results   Component Value Date    HUMBERTO 8.4 05/07/2018                Lab Results   Component Value Date    BILITOTAL 0.8 05/07/2018           Lab Results   Component Value Date    ALBUMIN 3.7 05/07/2018                    Lab Results   Component Value Date    ALT 21 05/07/2018           Lab Results   Component Value Date    AST 16 05/07/2018       Results reviewed, labs MET treatment parameters, ok to proceed with treatment.      Post Infusion Assessment:  Patient tolerated Zometa 4 mg infusion without incident.  Blood return noted pre and post infusion.  Site patent and intact, free from redness, edema or discomfort.  No evidence of extravasations.  Access discontinued per protocol.    Discharge Plan:   Copy of AVS reviewed with patient and/or family.  Patient will return in 1 month for next appointment. Patient knows to call schedulers to make appointment. Patient's daughter-in-law assists with all appointments and medical care. She will call the schedulers.  Patient discharged in stable condition accompanied by: friend.  Departure Mode: Ambulatory.    Wendy Martínez RN

## 2018-05-07 NOTE — MR AVS SNAPSHOT
After Visit Summary   5/7/2018    Quiana Dunaway    MRN: [id number]           Patient Information     Date Of Birth          1935        Visit Information        Provider Department      5/7/2018 1:40 PM Alex Parry MD [facility]        Today's Diagnoses     Multiple myeloma not having achieved remission (H)    -  1      Care Instructions    Continue chemotherapy.  Follow up in 1 month. Appointment scheduled 06/04/18          Follow-ups after your visit        Your next 10 appointments already scheduled     Jun 04, 2018  1:00 PM CDT   Return Visit with Leona Rodriguez PA-C   [facility] [place] ([facility] [place])    [address]  [address]  [place] [address] [phone]            Jun 04, 2018  1:30 PM CDT   Return Visit with  Oncology Nurse   [facility] ([facility])    [facility]  [address]  [facility] [address] [phone]            Jun 04, 2018  2:20 PM CDT   Return Visit with Alex Parry MD   [facility] ([facility])    [facility]  [address]  [place] [address] [phone]            Jun 18, 2018 12:30 PM CDT   Office Visit with César Chung MD   [facility] ([facility])    [address] [facility] [address] [phone]           Bring a current list of meds and any records pertaining to this visit. For Physicals, please bring immunization records and any forms needing to be filled out. Please arrive 10 minutes early to complete paperwork.              Who to contact     If you have questions or need follow up information about today's clinic visit or your schedule please contact [facility] directly at [phone].  Normal or non-critical lab and imaging results will be communicated to you by MyChart, letter or phone within 4  business days after the clinic has received the results. If you do not hear from us within 7 days, please contact the clinic through [employer] or phone. If you have a critical or abnormal lab result, we will notify you by phone as soon as possible.  Submit refill requests through [employer] or call your pharmacy and they will forward the refill request to us. Please allow 3 business days for your refill to be completed.          Additional Information About Your Visit        [employer] Information     [employer] gives you secure access to your electronic health record. If you see a primary care provider, you can also send messages to your care team and make appointments. If you have questions, please call your primary care clinic.  If you do not have a primary care provider, please call [phone] and they will assist you.        Care EveryWhere ID     This is your Care EveryWhere ID. This could be used by other organizations to access your [facility] medical records  [id number]        Your Vitals Were     Pulse Temperature Respirations Pulse Oximetry BMI (Body Mass Index)       85 98.8  F (37.1  C) (Oral) 16 95% 28.35 kg/m2        Blood Pressure from Last 3 Encounters:   No data found for BP    Weight from Last 3 Encounters:   No data found for Wt              Today, you had the following     No orders found for display       Primary Care Provider Office Phone # Fax #    César Chung [phone] [address] [facility] [address]  [facility] [address]        Equal Access to Services     [facility] CenterTHERESA : Hadii aad ku hadasho Soterrieali, waaxda luqadaha, qaybta kaalmada adedenzelyada, phoenix rodriguez . So [facility] [place] [facility] [phone].    ATENCIÓN: Si habla español, tiene a contreras disposición servicios gratuitos de asistencia lingüística. Llame al [phone].    We comply with applicable federal civil rights laws and [place] laws. We do not discriminate on the basis of race, color, national origin, age,  disability, sex, sexual orientation, or gender identity.            Thank you!     Thank you for choosing [facility]  for your care. Our goal is always to provide you with excellent care. Hearing back from our patients is one way we can continue to improve our services. Please take a few minutes to complete the written survey that you may receive in the mail after your visit with us. Thank you!             Your Updated Medication List - Protect others around you: Learn how to safely use, store and throw away your medicines at www.disposemymeds.org.          This list is accurate as of 5/7/18 11:59 PM.  Always use your most recent med list.                   Brand Name Dispense Instructions for use Diagnosis    acyclovir 400 MG tablet    ZOVIRAX    60 tablet    Take 1 tablet (400 mg) by mouth 2 times daily Viral Prophylaxis.    Multiple myeloma not having achieved remission (H)       aspirin 81 MG chewable tablet      Take 81 mg by mouth daily        calcium carbonate-vitamin D 600-400 MG-UNIT Chew     180 tablet    Take 1 chew tab by mouth 2 times daily    Multiple myeloma not having achieved remission (H)       dexamethasone 4 MG tablet    DECADRON    40 tablet    Take 10 tablets (40 mg) by mouth once a week Once a week with food on Days 1,8,15 and 22.    Multiple myeloma not having achieved remission (H)       escitalopram 5 MG tablet    LEXAPRO    90 tablet    TAKE 1 TABLET BY MOUTH DAILY    ROBER (generalized anxiety disorder)       IMODIUM A-D PO      Take by mouth as needed        LENalidomide 10 MG Caps capsule CHEMO    REVLIMID    21 capsule    Take 1 capsule (10 mg) by mouth daily for 21 days Take on Days 1 through 21 of 28-day cycle.    Multiple myeloma not having achieved remission (H)       lisinopril 10 MG tablet    PRINIVIL/ZESTRIL    90 tablet    Take 1 tablet (10 mg) by mouth daily    Benign essential hypertension       LORazepam 0.5 MG tablet    ATIVAN    10 tablet    Take 1 tablet (0.5  mg) by mouth every 8 hours as needed (Anxiety, Nausea/Vomiting or Sleep)    Multiple myeloma not having achieved remission (H)       nitroGLYcerin 0.4 MG sublingual tablet    NITROSTAT    25 tablet    For chest pain place 1 tablet under the tongue every 5 minutes for 3 doses. If symptoms persist 5 minutes after 1st dose call 911.    Atypical chest pain       * order for DME     1 Units    Dispense one 4 wheeled walker with hand brakes and seat    Multiple myeloma not having achieved remission (H)       * order for DME     1 Units    Wheelchair.    Need for assistance due to unsteady gait       prochlorperazine 10 MG tablet    COMPAZINE    30 tablet    Take 1 tablet (10 mg) by mouth every 6 hours as needed (Nausea/Vomiting)    Multiple myeloma not having achieved remission (H)       tolterodine 4 MG 24 hr capsule    DETROL LA    90 capsule    Take 1 capsule (4 mg) by mouth daily    Urinary frequency       ZOMETA IV           * Notice:  This list has 2 medication(s) that are the same as other medications prescribed for you. Read the directions carefully, and ask your doctor or other care provider to review them with you.

## 2018-05-07 NOTE — PROGRESS NOTES
Oral Chemotherapy Monitoring Program    Primary Oncologist: Dr. Parry  Primary Oncology Clinic: [facility]  Cancer Diagnosis: Multiple Myeloma    Therapy History:  Previously on Revlimid-Dex-Velcade (Rev 2 weeks on 1 week off); Switching on 5/7/18 to Revlimid 3 weeks on 1 week off + dex    Drug Interaction Assessment: none    Lab Monitoring Plan  CBC/CMP every 4 weeks  Subjective/Objective:  Quiana Dunaway is a 82 year old female seen in clinic for a follow-up visit for oral chemotherapy.  Met with Quiana in clinic today to verify that she understands she will now switch to 3 weeks on 1 week off of her Revlimid. Patient understands plan. She has no questions at this time. States she will start this evening. She is aware she will come in only once every 4 weeks for labs now, but will call sooner if she has any questions or concerns.    ORAL CHEMOTHERAPY 8/14/2017 9/18/2017 10/16/2017 5/7/2018   Drug Name Revlimid (Lenalidomide) Revlimid (Lenalidomide) Revlimid (Lenalidomide) Revlimid (Lenalidomide)   Current Dosage 10mg 10mg 10mg 10mg   Current Schedule Daily Daily Daily Daily   Cycle Details 2 weeks on 1 week off 2 weeks on 1 week off 2 weeks on 1 week off 3 weeks on 1 week off   Start Date of Last Cycle 8/14/2017 9/5/2017 10/2/2017 5/7/2018   Planned next cycle start date - 10/2/2017 10/23/2017 6/4/2018   Doses missed in last 2 weeks - more 0 0   Adherence Assessment - Adherent Adherent -   Adverse Effects - Fatigue Fatigue -   Any new drug interactions? - No No No   Is the dose as ordered appropriate for the patient? - No No Yes       Last PHQ-2 Score on record:   PHQ-2 ( 1999 [employer]) 10/17/2017 9/20/2016   Q1: Little interest or pleasure in doing things 0 0   Q2: Feeling down, depressed or hopeless 0 0   PHQ-2 Score 0 0       Patient does not report depression symptoms.      Vitals:  BP:   BP Readings from Last 1 Encounters:   05/07/18 123/57     Wt Readings from Last 1 Encounters:   05/07/18 70.3 kg (155 lb)  "    Estimated body surface area is 1.75 meters squared as calculated from the following:    Height as of 4/30/18: 1.575 m (5' 2\").    Weight as of an earlier encounter on 5/7/18: 70.3 kg (155 lb).    Labs:  _  Result Component Current Result Ref Range   Sodium 141 (5/7/2018) 133 - 144 mmol/L     _  Result Component Current Result Ref Range   Potassium 3.5 (5/7/2018) 3.4 - 5.3 mmol/L     _  Result Component Current Result Ref Range   Calcium 8.4 (L) (5/7/2018) 8.5 - 10.1 mg/dL     No results found for Mag within last 30 days.     No results found for Phos within last 30 days.     _  Result Component Current Result Ref Range   Albumin 3.7 (5/7/2018) 3.4 - 5.0 g/dL     _  Result Component Current Result Ref Range   Urea Nitrogen 18 (5/7/2018) 7 - 30 mg/dL     _  Result Component Current Result Ref Range   Creatinine 0.58 (5/7/2018) 0.52 - 1.04 mg/dL       _  Result Component Current Result Ref Range   AST 16 (5/7/2018) 0 - 45 U/L     _  Result Component Current Result Ref Range   ALT 21 (5/7/2018) 0 - 50 U/L     _  Result Component Current Result Ref Range   Bilirubin Total 0.8 (5/7/2018) 0.2 - 1.3 mg/dL       _  Result Component Current Result Ref Range   WBC 5.9 (5/7/2018) 4.0 - 11.0 10e9/L     _  Result Component Current Result Ref Range   Hemoglobin 12.3 (5/7/2018) 11.7 - 15.7 g/dL     _  Result Component Current Result Ref Range   Platelet Count 176 (5/7/2018) 150 - 450 10e9/L     _  Result Component Current Result Ref Range   Absolute Neutrophil 3.0 (5/7/2018) 1.6 - 8.3 10e9/L           Labs WNL    Assessment:  Patient has tolerated Revlimid fine in the past. Ok to switch to different cycle length and patient understands new instructions.    Plan:  Start Revlimid 10mg daily x 3 weeks off for 1 week on 5/7/18    Follow-Up:  4 weeks with CBC/CMP    Refill Due:  6/4/18    Debra Matos PharmD  May 7, 2018      "

## 2018-05-07 NOTE — LETTER
5/7/2018         RE: Quiana Dunaway  [address] [facility] [address]        Dear Colleague,    Thank you for referring your patient, Quiana Dunaway, to the [facility]. Please see a copy of my visit note below.    Medical Assistant Note:  Quiana Dunaway presents today for lab visit.    Patient seen by provider today: Yes: Dr. Parry.   present during visit today: Not Applicable.    Concerns: No Concerns.    Procedure:  Lab draw site: LAC , Needle type: BF, Gauge: 21 g gauze and coban applied.    Post Assessment:  Labs drawn without difficulty: Yes.    Discharge Plan:  Departure Mode: Ambulatory.    Face to Face Time: 4.    Isabel Sarmiento MA              Again, thank you for allowing me to participate in the care of your patient.        Sincerely,        Oncology Nurse

## 2018-05-07 NOTE — Clinical Note
"    5/7/2018         RE: Quiana Dunaway  [address] [facility] [address]        Dear Colleague,    Thank you for referring your patient, Quiana Dunaway, to the [facility]. Please see a copy of my visit note below.    Oncology Rooming Note    May 7, 2018 1:27 PM   Quiana Dunaway is a 82 year old female who presents for:    Chief Complaint   Patient presents with     Oncology Clinic Visit     Multiple myeloma not having achieved remission      Initial Vitals: /57 (BP Location: Left arm, Patient Position: Sitting, Cuff Size: Adult Regular)  Pulse 85  Temp 98.8  F (37.1  C) (Oral)  Resp 16  Wt 70.3 kg (155 lb)  SpO2 95%  BMI 28.35 kg/m2 Estimated body mass index is 28.35 kg/(m^2) as calculated from the following:    Height as of 4/30/18: 1.575 m (5' 2\").    Weight as of this encounter: 70.3 kg (155 lb). Body surface area is 1.75 meters squared.  No Pain (0) Comment: Data Unavailable   No LMP recorded. Patient is postmenopausal.  Allergies reviewed: Yes  Medications reviewed: Yes    Medications: Medication refills not needed today.  Pharmacy name entered into [facility]:    [facility] - [place], [place] - [address] AT [facility] [address]  [facility] [place], [place] [address]  [facility] - [place], [place] - [address]    Clinical concerns: None                    4 minutes for nursing intake (face to face time)     Isabel Sarmiento MA              Visit Date:   05/07/2018     HEMATOLOGY HISTORY: Mrs. Dunaway is a lady with multiple myeloma (IgG kappa). High risk, t(14;16).  1. On 05/01/2017, WBC of 3.4, hemoglobin of 10.2 and platelet of 154.  2. SPEP on 07/07/2017 revealed M-spike of 1.8.  3. Bone marrow biopsy on 07/12/2017 reveals hypercellular bone marrow with kappa monotypic plasma cell population consistent with multiple myeloma.  Bone marrow is 70% cellular.  Plasma cell is 50-60%.     -There is gain of " chromosome 1, 5, 9, 15, and 17.  There is translocation 14;16.   4.  On 07/18/2017:  -M-spike of 1.9.   -Immunofixation reveals monoclonal IgG kappa.    -IgG level of 2970.  IgA of 15 and IgM of 175.    -Kappa free light chain of 67.7.  Lambda free light chain of 1.42.  Ratio of kappa to lambda of 47.71.    -Beta 2 microglobulin of 3.4.   5. PET scan on 07/24/2017 reveals several focal areas of increased FDG uptake consistent with multiple myeloma.  There is probable epithelial cyst in tail of the pancreas.  No associated abnormal FDG activity.  Left thyroid cysts or nodules without any FDG activity.  There is a 0.3 cm left upper lobe lung nodule.   8.  Velcade, Revlimid and dexamethasone between on 08/14/2017 and 04/30/2018.  9. Revlimid and dexamethasone started      SUBJECTIVE:    Ms. Dunaway is an 82-year-old female with IgG kappa multiple myeloma.  She received Velcade, Revlimid and dexamethasone between 08/14/2017 and 04/30/2018.  Her disease has responded.  Now, she will be on single agent Revlimid with dexamethasone.      She is here for followup.  Overall, she is doing well.  She has some fatigue.  No headache.  No dizziness.  No chest pain or difficulty breathing.  No abdominal pain, nausea or vomiting.  No bleeding.  No fever, chills or night sweats.  Appetite has been good.  She has some neuropathy from velcade.  She has some numbness and tingling in the fingers and toes.  They are stable.  She can walk well.  Things are not falling out of her hand.      PHYSICAL EXAMINATION:   GENERAL:  She was alert and oriented x3.   VITAL SIGNS:  Reviewed.     Rest of the systems not examine.      LABORATORY DATA:  Reviewed.      ASSESSMENT:   1.  An 82-year-old female with high risk IgG kappa multiple myeloma.   2.  Peripheral neuropathy which is stable.   3.  Fatigue.      PLAN:   1.  Patient clinically is stable from multiple myeloma.  Labs done on 04/16/2018 reveals stable M-spike.  Her kappa free light chain  and IgG were just minimally elevated from before.      Discussed with her regarding further treatment.  We will stop the Velcade.  She will continue Revlimid and dexamethasone.  She is agreeable for it.  Hopefully, some of the side effects including fatigue and neuropathy will improve.      2.  I explained to the patient that her myeloma will progress in future.  When it does, we will add other treatment like daratumumab.      3.  She will continue on Zometa for bone health.  She is tolerating it well.  No dental or jaw related problem.  No pain, swelling, infection or fracture.      4.  She had few questions, which were all answered.  I will see her in a month for followup.  Advised her to see a physician sooner if she has worsening weakness, chest pain, shortness of breath, recurrent vomiting or bleeding or any other concerns.         YVES PEREZ MD             D: 2018   T: 2018   MT: RO      Name:     DUGLAS CASTILLO   MRN:      [id number]        Account:      [id number]   :      1935           Visit Date:   2018      Document: [id number]        Again, thank you for allowing me to participate in the care of your patient.        Sincerely,        Yves Perez MD

## 2018-05-07 NOTE — PROGRESS NOTES
"Oncology Rooming Note    May 7, 2018 1:27 PM   Quiana Dunaway is a 82 year old female who presents for:    Chief Complaint   Patient presents with     Oncology Clinic Visit     Multiple myeloma not having achieved remission      Initial Vitals: /57 (BP Location: Left arm, Patient Position: Sitting, Cuff Size: Adult Regular)  Pulse 85  Temp 98.8  F (37.1  C) (Oral)  Resp 16  Wt 70.3 kg (155 lb)  SpO2 95%  BMI 28.35 kg/m2 Estimated body mass index is 28.35 kg/(m^2) as calculated from the following:    Height as of 4/30/18: 1.575 m (5' 2\").    Weight as of this encounter: 70.3 kg (155 lb). Body surface area is 1.75 meters squared.  No Pain (0) Comment: Data Unavailable   No LMP recorded. Patient is postmenopausal.  Allergies reviewed: Yes  Medications reviewed: Yes    Medications: Medication refills not needed today.  Pharmacy name entered into [facility]:    [id number] - [place], [place] - [address] AT [facility] [address]  [facility] [place], [place] - [address]  [facility] - [place], [place] - [address]    Clinical concerns: None                    4 minutes for nursing intake (face to face time)     Isabel Sarmiento MA            "

## 2018-05-08 LAB
ALBUMIN SERPL ELPH-MCNC: 3.9 G/DL (ref 3.7–5.1)
ALPHA1 GLOB SERPL ELPH-MCNC: 0.3 G/DL (ref 0.2–0.4)
ALPHA2 GLOB SERPL ELPH-MCNC: 0.8 G/DL (ref 0.5–0.9)
B-GLOBULIN SERPL ELPH-MCNC: 0.6 G/DL (ref 0.6–1)
GAMMA GLOB SERPL ELPH-MCNC: 0.5 G/DL (ref 0.7–1.6)
IGG SERPL-MCNC: 498 MG/DL (ref 695–1620)
KAPPA LC UR-MCNC: 2.96 MG/DL (ref 0.33–1.94)
KAPPA LC/LAMBDA SER: 3.89 {RATIO} (ref 0.26–1.65)
LAMBDA LC SERPL-MCNC: 0.76 MG/DL (ref 0.57–2.63)
M PROTEIN SERPL ELPH-MCNC: 0.3 G/DL
PROT PATTERN SERPL ELPH-IMP: ABNORMAL

## 2018-05-30 DIAGNOSIS — C90.00 MULTIPLE MYELOMA NOT HAVING ACHIEVED REMISSION (H): Primary | ICD-10-CM

## 2018-05-30 RX ORDER — DEXAMETHASONE 4 MG/1
40 TABLET ORAL WEEKLY
Qty: 40 TABLET | Refills: 0 | Status: SHIPPED | OUTPATIENT
Start: 2018-05-30 | End: 2018-07-09

## 2018-05-30 RX ORDER — LENALIDOMIDE 10 MG/1
10 CAPSULE ORAL DAILY
Qty: 21 CAPSULE | Refills: 0 | Status: SHIPPED | OUTPATIENT
Start: 2018-05-30 | End: 2018-12-26

## 2018-06-04 ENCOUNTER — ONCOLOGY VISIT (OUTPATIENT)
Dept: ONCOLOGY | Facility: CLINIC | Age: 83
End: 2018-06-04
Attending: INTERNAL MEDICINE
Payer: MEDICARE

## 2018-06-04 ENCOUNTER — DOCUMENTATION ONLY (OUTPATIENT)
Dept: PHARMACY | Facility: CLINIC | Age: 83
End: 2018-06-04

## 2018-06-04 ENCOUNTER — OFFICE VISIT (OUTPATIENT)
Dept: UROLOGY | Facility: CLINIC | Age: 83
End: 2018-06-04
Payer: MEDICARE

## 2018-06-04 ENCOUNTER — HOSPITAL ENCOUNTER (OUTPATIENT)
Facility: CLINIC | Age: 83
Setting detail: SPECIMEN
Discharge: HOME | End: 2018-06-04
Attending: INTERNAL MEDICINE | Admitting: INTERNAL MEDICINE
Payer: MEDICARE

## 2018-06-04 VITALS
RESPIRATION RATE: 16 BRPM | TEMPERATURE: 98.8 F | BODY MASS INDEX: 27.98 KG/M2 | WEIGHT: 153 LBS | HEART RATE: 82 BPM | SYSTOLIC BLOOD PRESSURE: 149 MMHG | DIASTOLIC BLOOD PRESSURE: 75 MMHG | OXYGEN SATURATION: 96 %

## 2018-06-04 VITALS
WEIGHT: 155 LBS | HEIGHT: 62 IN | HEART RATE: 60 BPM | SYSTOLIC BLOOD PRESSURE: 120 MMHG | BODY MASS INDEX: 28.52 KG/M2 | DIASTOLIC BLOOD PRESSURE: 72 MMHG

## 2018-06-04 DIAGNOSIS — C90.00 MULTIPLE MYELOMA NOT HAVING ACHIEVED REMISSION (H): ICD-10-CM

## 2018-06-04 DIAGNOSIS — N32.81 OAB (OVERACTIVE BLADDER): Primary | ICD-10-CM

## 2018-06-04 DIAGNOSIS — C90.00 MULTIPLE MYELOMA NOT HAVING ACHIEVED REMISSION (H): Primary | ICD-10-CM

## 2018-06-04 LAB
ALBUMIN SERPL-MCNC: 3.4 G/DL (ref 3.4–5)
ALP SERPL-CCNC: 86 U/L (ref 40–150)
ALT SERPL W P-5'-P-CCNC: 21 U/L (ref 0–50)
ANION GAP SERPL CALCULATED.3IONS-SCNC: 8 MMOL/L (ref 3–14)
AST SERPL W P-5'-P-CCNC: 16 U/L (ref 0–45)
BASOPHILS # BLD AUTO: 0 10E9/L (ref 0–0.2)
BASOPHILS NFR BLD AUTO: 0.5 %
BILIRUB SERPL-MCNC: 0.4 MG/DL (ref 0.2–1.3)
BUN SERPL-MCNC: 15 MG/DL (ref 7–30)
CALCIUM SERPL-MCNC: 8.5 MG/DL (ref 8.5–10.1)
CHLORIDE SERPL-SCNC: 109 MMOL/L (ref 94–109)
CO2 SERPL-SCNC: 26 MMOL/L (ref 20–32)
CREAT SERPL-MCNC: 0.59 MG/DL (ref 0.52–1.04)
DIFFERENTIAL METHOD BLD: NORMAL
EOSINOPHIL # BLD AUTO: 0.1 10E9/L (ref 0–0.7)
EOSINOPHIL NFR BLD AUTO: 2.2 %
ERYTHROCYTE [DISTWIDTH] IN BLOOD BY AUTOMATED COUNT: 14 % (ref 10–15)
GFR SERPL CREATININE-BSD FRML MDRD: >90 ML/MIN/1.7M2
GLUCOSE SERPL-MCNC: 112 MG/DL (ref 70–99)
HCT VFR BLD AUTO: 37.4 % (ref 35–47)
HGB BLD-MCNC: 12.7 G/DL (ref 11.7–15.7)
IMM GRANULOCYTES # BLD: 0 10E9/L (ref 0–0.4)
IMM GRANULOCYTES NFR BLD: 0.4 %
LYMPHOCYTES # BLD AUTO: 1.6 10E9/L (ref 0.8–5.3)
LYMPHOCYTES NFR BLD AUTO: 29 %
MCH RBC QN AUTO: 29.8 PG (ref 26.5–33)
MCHC RBC AUTO-ENTMCNC: 34 G/DL (ref 31.5–36.5)
MCV RBC AUTO: 88 FL (ref 78–100)
MONOCYTES # BLD AUTO: 0.9 10E9/L (ref 0–1.3)
MONOCYTES NFR BLD AUTO: 15.6 %
NEUTROPHILS # BLD AUTO: 2.9 10E9/L (ref 1.6–8.3)
NEUTROPHILS NFR BLD AUTO: 52.3 %
NRBC # BLD AUTO: 0 10*3/UL
NRBC BLD AUTO-RTO: 0 /100
PLATELET # BLD AUTO: 192 10E9/L (ref 150–450)
POTASSIUM SERPL-SCNC: 3.4 MMOL/L (ref 3.4–5.3)
PROT SERPL-MCNC: 6.6 G/DL (ref 6.8–8.8)
RBC # BLD AUTO: 4.26 10E12/L (ref 3.8–5.2)
SODIUM SERPL-SCNC: 143 MMOL/L (ref 133–144)
WBC # BLD AUTO: 5.5 10E9/L (ref 4–11)

## 2018-06-04 PROCEDURE — G0463 HOSPITAL OUTPT CLINIC VISIT: HCPCS

## 2018-06-04 PROCEDURE — 99213 OFFICE O/P EST LOW 20 MIN: CPT | Performed by: PHYSICIAN ASSISTANT

## 2018-06-04 PROCEDURE — 36415 COLL VENOUS BLD VENIPUNCTURE: CPT

## 2018-06-04 PROCEDURE — 85025 COMPLETE CBC W/AUTO DIFF WBC: CPT | Performed by: INTERNAL MEDICINE

## 2018-06-04 PROCEDURE — 99214 OFFICE O/P EST MOD 30 MIN: CPT | Performed by: INTERNAL MEDICINE

## 2018-06-04 PROCEDURE — 80053 COMPREHEN METABOLIC PANEL: CPT | Performed by: INTERNAL MEDICINE

## 2018-06-04 RX ORDER — ZOLEDRONIC ACID 0.04 MG/ML
4 INJECTION, SOLUTION INTRAVENOUS ONCE
Status: CANCELLED | OUTPATIENT
Start: 2018-08-13 | End: 2018-06-04

## 2018-06-04 RX ORDER — TOLTERODINE 4 MG/1
4 CAPSULE, EXTENDED RELEASE ORAL DAILY
Qty: 90 CAPSULE | Refills: 4 | Status: SHIPPED | OUTPATIENT
Start: 2018-06-04 | End: 2018-06-08 | Stop reason: ALTCHOICE

## 2018-06-04 ASSESSMENT — PAIN SCALES - GENERAL
PAINLEVEL: NO PAIN (0)
PAINLEVEL: NO PAIN (0)

## 2018-06-04 NOTE — PROGRESS NOTES
CC: Urinary urgency and frequency.    HPI: It is a pleasure to see Quiana Dunaway, an 82 year old female seen in follow up for the above. This problem had been going on for a several months and was progressively worsening. No symptoms  during the day, had nocturia q1-2 hours. There was some urgency associated with symptoms, no urge incontinence. The patient does not wear protective pads. Rare leakage with coughing, sneezing, bending at the waist.  Started on Detrol 3 months ago and notes huge improvement in symptoms. Overall, very satisfied.      Denies any dysuria, gross hematuria, pyuria, sensation of incomplete bladder emptying, needing to strain or Crede to void, history of recent urinary tract infections or kidney stones. The patient does not have any neurologic issues. Denies constipation. Fluid consumption includes water daily, not much caffeine.  Undergoing treatment for MM.     Past Medical History:   Diagnosis Date     HTN (hypertension)      Multiple myeloma not having achieved remission (H) 7/18/2017     Pacemaker      Pancytopenia (H) 6/28/2017     Past Surgical History:   Procedure Laterality Date     BONE MARROW BIOPSY, BONE SPECIMEN, NEEDLE/TROCAR N/A 7/12/2017    Procedure: BIOPSY BONE MARROW;  BONE MARROW BIOPSY ;  Surgeon: Grace Vela MD;  Location:  GI     IMPLANT PACEMAKER  03/2017    AV block     Current Outpatient Prescriptions   Medication Sig Dispense Refill     acyclovir (ZOVIRAX) 400 MG tablet Take 1 tablet (400 mg) by mouth 2 times daily Viral Prophylaxis. 60 tablet 5     aspirin 81 MG chewable tablet Take 81 mg by mouth daily        calcium carbonate-vitamin D 600-400 MG-UNIT CHEW Take 1 chew tab by mouth 2 times daily 180 tablet 3     dexamethasone (DECADRON) 4 MG tablet Take 10 tablets (40 mg) by mouth once a week Once a week with food on Days 1,8,15 and 22. 40 tablet 0     dexamethasone (DECADRON) 4 MG tablet Take 10 tablets (40 mg) by mouth once a week Once a week  "with food on Days 1,8,15 and 22. 40 tablet 0     escitalopram (LEXAPRO) 5 MG tablet TAKE 1 TABLET BY MOUTH DAILY 90 tablet 1     LENalidomide (REVLIMID) 10 MG CAPS capsule CHEMO Take 1 capsule (10 mg) by mouth daily for 21 days Take on Days 1 through 21 of 28-day cycle. 21 capsule 0     lisinopril (PRINIVIL/ZESTRIL) 10 MG tablet Take 1 tablet (10 mg) by mouth daily 90 tablet 3     Loperamide HCl (IMODIUM A-D PO) Take by mouth as needed       LORazepam (ATIVAN) 0.5 MG tablet Take 1 tablet (0.5 mg) by mouth every 8 hours as needed (Anxiety, Nausea/Vomiting or Sleep) 10 tablet 3     nitroglycerin (NITROSTAT) 0.4 MG sublingual tablet For chest pain place 1 tablet under the tongue every 5 minutes for 3 doses. If symptoms persist 5 minutes after 1st dose call 911. 25 tablet 0     order for DME Wheelchair. 1 Units 0     order for DME Dispense one 4 wheeled walker with hand brakes and seat 1 Units 0     prochlorperazine (COMPAZINE) 10 MG tablet Take 1 tablet (10 mg) by mouth every 6 hours as needed (Nausea/Vomiting) 30 tablet 3     tolterodine (DETROL LA) 4 MG 24 hr capsule Take 1 capsule (4 mg) by mouth daily 90 capsule 3     Zoledronic Acid (ZOMETA IV)        No Known Allergies  Family History: There is no h/o  malignancy.  There is no h/o urolithiasis.    Social History: The patient does not smoke cigarettes. Denies EtOH and illicit drug use.      ROS: A comprehensive 14 point ROS was obtained and otherwise negative except for that outlined above in the HPI.    PHYSICAL EXAM:   /72 (BP Location: Right arm, Patient Position: Chair, Cuff Size: Adult Regular)  Pulse 60  Ht 1.575 m (5' 2\")  Wt 70.3 kg (155 lb)  BMI 28.35 kg/m2  GENERAL: Well groomed, well developed, well nourished female in NAD.  HEENT: EOMI, AT, NC.  SKIN: Warm to touch, dry.  No visible rashes or lesions on examined areas.  RESP: No increased respiratory effort.   LYMPH: No LE edema.  ABD: Soft, NT, ND.  No CVAT bilaterally.  MS: Full ROM in " extremities.  NEURO: Alert and oriented x 3.  PSYCH: Normal mood and affect, pleasant and agreeable during interview and exam.    U/A: unable    IMAGING: None    ASSESSMENT/PLAN:  Ms. Qiuana Dunaway is a 82 year old female with nocturia, improved on Detrol.   - Detrol LA 4mg. Rx sent to patient's pharmacy. Side effects again discussed (not having any).  - Reviewed behavioral therapies, such as timed voiding, pelvic floor relaxation while voiding and not voiding in a hurry.   -Premarin (Pt would like to think about it), this would treat atrophic vaginitis and urethral changes which may impact her pelvic discomfort and improve her urinary urgency      Follow up in 12 months for reassessment or sooner with concerns.      Will consider urodynamics and intravesical examination with cystoscopy with Dr. Alvarado in the future if the patient fails medication therapy.      Leona Rodriguez PA-C  [facility]    10 min spent face to face with the patient, >50% of that time spent on counseling and coordination of care.

## 2018-06-04 NOTE — PROGRESS NOTES
Visit Date:   06/04/2018     HEMATOLOGY HISTORY: Mrs. Dunaway is a lady with multiple myeloma (IgG kappa). High risk, t(14;16).  1. On 05/01/2017, WBC of 3.4, hemoglobin of 10.2 and platelet of 154.  2. SPEP on 07/07/2017 revealed M-spike of 1.8.  3. Bone marrow biopsy on 07/12/2017 reveals hypercellular bone marrow with kappa monotypic plasma cell population consistent with multiple myeloma.  Bone marrow is 70% cellular.  Plasma cell is 50-60%.     -There is gain of chromosome 1, 5, 9, 15, and 17.  There is translocation 14;16.   4.  On 07/18/2017:  -M-spike of 1.9.   -Immunofixation reveals monoclonal IgG kappa.    -IgG level of 2970.  IgA of 15 and IgM of 175.    -Kappa free light chain of 67.7.  Lambda free light chain of 1.42.  Ratio of kappa to lambda of 47.71.    -Beta 2 microglobulin of 3.4.   5. PET scan on 07/24/2017 reveals several focal areas of increased FDG uptake consistent with multiple myeloma.  There is probable epithelial cyst in tail of the pancreas.  No associated abnormal FDG activity.  Left thyroid cysts or nodules without any FDG activity.  There is a 0.3 cm left upper lobe lung nodule.   8.  Velcade, Revlimid and dexamethasone between on 08/14/2017 and 04/30/2018. Velcade stopped. Revlimid and dexamethasone continued.     SUBJECTIVE:    Ms. Dunaway is an 82-year-old female with IgG kappa multiple myeloma.  She was on Velcade, Revlimid and dexamethasone between August 2017 and April 2018.  Now she is on Revlimid and dexamethasone.  She is tolerating it well.      She feels better since she stopped Velcade.  She feels stronger.  She is not having any nausea.  No vomiting.  Appetite is good.  Overall, she feels better.      No headache.  No dizziness.  No chest pain or difficulty breathing.  No abdominal pain, nausea or vomiting.  No urinary or bowel complaints.  No bleeding.      PHYSICAL EXAMINATION:   GENERAL:  Alert and oriented x 3.   VITAL SIGNS:  Reviewed.    Rest of the system not  examined.      LABORATORY DATA:  Reviewed.      ASSESSMENT:   1.  An 82-year-old female with IgG kappa multiple myeloma.   2.  Peripheral neuropathy which is stable.     3.  Fatigue, which has improved since stopping Velcade.      PLAN:   1.  Patient overall is stable from myeloma.  CBC and CMP essentially normal except mildly low protein. SPEP and IgG will be rechecked next month. She is on Revlimid and dexamethasone.  She is tolerating it well.  We will continue her on that.        2.  She has been getting monthly Zometa.  She wants this month off. Will resume it next month.      3.  I will see her in 4-6 weeks.  Advised her to return sooner if she has any bone pain, worsening fatigue, recurrent infection, bleeding or any other concerns.         YVES PEREZ MD             D: 2018   T: 2018   MT: SAMM      Name:     DUGLAS CASTILLO   MRN:      [id number]        Account:      [id number]   :      1935           Visit Date:   2018      Document: [id number]

## 2018-06-04 NOTE — PROGRESS NOTES
"Oncology Rooming Note    June 4, 2018 1:39 PM   Quiana Dunaway is a 82 year old female who presents for:    Chief Complaint   Patient presents with     Oncology Clinic Visit     Multiple myeloma not having achieved remission      Initial Vitals: /75 (BP Location: Left arm, Patient Position: Sitting, Cuff Size: Adult Regular)  Pulse 82  Temp 98.8  F (37.1  C) (Oral)  Resp 16  Wt 69.4 kg (153 lb)  SpO2 96%  BMI 27.98 kg/m2 Estimated body mass index is 27.98 kg/(m^2) as calculated from the following:    Height as of an earlier encounter on 6/4/18: 1.575 m (5' 2\").    Weight as of this encounter: 69.4 kg (153 lb). Body surface area is 1.74 meters squared.  No Pain (0) Comment: Data Unavailable   No LMP recorded. Patient is postmenopausal.  Allergies reviewed: Yes  Medications reviewed: Yes    Medications: Medication refills not needed today.  Pharmacy name entered into [facility]:    [facility] [place] - [address] [facility] [address]  [facility] [place], [place] - [address]  [facility] - [place], [place] - [address]    Clinical concerns: None                    4 minutes for nursing intake (face to face time)     Isabel Sarmiento MA            "

## 2018-06-04 NOTE — LETTER
"    6/4/2018         RE: Quiana Dunaway  [address] [facility] [address]        Dear Colleague,    Thank you for referring your patient, Quiana Dunaway, to the [facility]. Please see a copy of my visit note below.    Oncology Rooming Note    June 4, 2018 1:39 PM   Quiana Dunaway is a 82 year old female who presents for:    Chief Complaint   Patient presents with     Oncology Clinic Visit     Multiple myeloma not having achieved remission      Initial Vitals: /75 (BP Location: Left arm, Patient Position: Sitting, Cuff Size: Adult Regular)  Pulse 82  Temp 98.8  F (37.1  C) (Oral)  Resp 16  Wt 69.4 kg (153 lb)  SpO2 96%  BMI 27.98 kg/m2 Estimated body mass index is 27.98 kg/(m^2) as calculated from the following:    Height as of an earlier encounter on 6/4/18: 1.575 m (5' 2\").    Weight as of this encounter: 69.4 kg (153 lb). Body surface area is 1.74 meters squared.  No Pain (0) Comment: Data Unavailable   No LMP recorded. Patient is postmenopausal.  Allergies reviewed: Yes  Medications reviewed: Yes    Medications: Medication refills not needed today.  Pharmacy name entered into [facility]:    [id number] - [place], [place] - [address] AT [facility] [address]  [facility] [place], [place] - [address]  [facility] - [place], [place] - [address]    Clinical concerns: None                    4 minutes for nursing intake (face to face time)     Isabel Sarmiento MA              Visit Date:   06/04/2018     HEMATOLOGY HISTORY: Mrs. Dunaway is a lady with multiple myeloma (IgG kappa). High risk, t(14;16).  1. On 05/01/2017, WBC of 3.4, hemoglobin of 10.2 and platelet of 154.  2. SPEP on 07/07/2017 revealed M-spike of 1.8.  3. Bone marrow biopsy on 07/12/2017 reveals hypercellular bone marrow with kappa monotypic plasma cell population consistent with multiple myeloma.  Bone marrow is 70% cellular.  Plasma cell is 50-60%. "     -There is gain of chromosome 1, 5, 9, 15, and 17.  There is translocation 14;16.   4.  On 07/18/2017:  -M-spike of 1.9.   -Immunofixation reveals monoclonal IgG kappa.    -IgG level of 2970.  IgA of 15 and IgM of 175.    -Kappa free light chain of 67.7.  Lambda free light chain of 1.42.  Ratio of kappa to lambda of 47.71.    -Beta 2 microglobulin of 3.4.   5. PET scan on 07/24/2017 reveals several focal areas of increased FDG uptake consistent with multiple myeloma.  There is probable epithelial cyst in tail of the pancreas.  No associated abnormal FDG activity.  Left thyroid cysts or nodules without any FDG activity.  There is a 0.3 cm left upper lobe lung nodule.   8.  Velcade, Revlimid and dexamethasone between on 08/14/2017 and 04/30/2018. Velcade stopped. Revlimid and dexamethasone continued.     SUBJECTIVE:    Ms. Dunaway is an 82-year-old female with IgG kappa multiple myeloma.  She was on Velcade, Revlimid and dexamethasone between August 2017 and April 2018.  Now she is on Revlimid and dexamethasone.  She is tolerating it well.      She feels better since she stopped Velcade.  She feels stronger.  She is not having any nausea.  No vomiting.  Appetite is good.  Overall, she feels better.      No headache.  No dizziness.  No chest pain or difficulty breathing.  No abdominal pain, nausea or vomiting.  No urinary or bowel complaints.  No bleeding.      PHYSICAL EXAMINATION:   GENERAL:  Alert and oriented x 3.   VITAL SIGNS:  Reviewed.    Rest of the system not examined.      LABORATORY DATA:  Reviewed.      ASSESSMENT:   1.  An 82-year-old female with IgG kappa multiple myeloma.   2.  Peripheral neuropathy which is stable.     3.  Fatigue, which has improved since stopping Velcade.      PLAN:   1.  Patient overall is stable from myeloma.  CBC and CMP essentially normal except mildly low protein. SPEP and IgG will be rechecked next month. She is on Revlimid and dexamethasone.  She is tolerating it well.  We  will continue her on that.        2.  She has been getting monthly Zometa.  She wants this month off. Will resume it next month.      3.  I will see her in 4-6 weeks.  Advised her to return sooner if she has any bone pain, worsening fatigue, recurrent infection, bleeding or any other concerns.         YVES PEREZ MD             D: 2018   T: 2018   MT: SAMM      Name:     DUGLAS CASTILLO   MRN:      [id number]        Account:      [id number]   :      1935           Visit Date:   2018      Document: [id number]        Again, thank you for allowing me to participate in the care of your patient.        Sincerely,        Yves Perez MD

## 2018-06-04 NOTE — MR AVS SNAPSHOT
After Visit Summary   6/4/2018    Quiana Dunaway    MRN: [id number]           Patient Information     Date Of Birth          1935        Visit Information        Provider Department      6/4/2018 1:30 PM Nurse,  Oncology [facility]        Today's Diagnoses     Multiple myeloma not having achieved remission (H)           Follow-ups after your visit        Your next 10 appointments already scheduled     Jun 04, 2018  2:20 PM CDT   Return Visit with Alex Parry MD   [facility] ([facility])    [facility]  [address] [place]  [facility] [address] [phone]            Jun 18, 2018 12:30 PM CDT   [facility]    [address] [facility] [id number] [phone]           Bring a current list of meds and any records pertaining to this visit. For Physicals, please bring immunization records and any forms needing to be filled out. Please arrive 10 minutes early to complete paperwork.              Who to contact     If you have questions or need follow up information about today's clinic visit or your schedule please contact [facility] directly at [phone].  Normal or non-critical lab and imaging results will be communicated to you by Patient Home Monitoringhart, letter or phone within 4 business days after the clinic has received the results. If you do not hear from us within 7 days, please contact the clinic through [employer] or phone. If you have a critical or abnormal lab result, we will notify you by phone as soon as possible.  Submit refill requests through Business Lab or call your pharmacy and they will forward the refill request to us. Please allow 3 business days for your refill to be completed.          Additional Information About Your Visit        Patient Home Monitoringharlark Information     [employer] gives you secure access to your electronic health record. If you  see a primary care provider, you can also send messages to your care team and make appointments. If you have questions, please call your primary care clinic.  If you do not have a primary care provider, please call [phone] and they will assist you.        Care EveryWhere ID     This is your Care EveryWhere ID. This could be used by other organizations to access your [place] medical records  [id number]         Blood Pressure from Last 3 Encounters:   06/04/18 120/72   05/07/18 123/57   04/30/18 140/67    Weight from Last 3 Encounters:   06/04/18 70.3 kg (155 lb)   05/07/18 70.3 kg (155 lb)   04/30/18 70.5 kg (155 lb 6.4 oz)              We Performed the Following     **Comprehensive metabolic panel FUTURE anytime     CBC with platelets and differential          Today's Medication Changes          These changes are accurate as of 6/4/18  1:38 PM.  If you have any questions, ask your nurse or doctor.               These medicines have changed or have updated prescriptions.        Dose/Directions    * tolterodine 4 MG 24 hr capsule   Commonly known as:  DETROL LA   This may have changed:  Another medication with the same name was added. Make sure you understand how and when to take each.   Used for:  Urinary frequency   Changed by:  Leona Rodriguez PA-C        Dose:  4 mg   Take 1 capsule (4 mg) by mouth daily   Quantity:  90 capsule   Refills:  3       * tolterodine 4 MG 24 hr capsule   Commonly known as:  DETROL LA   This may have changed:  You were already taking a medication with the same name, and this prescription was added. Make sure you understand how and when to take each.   Used for:  OAB (overactive bladder)   Changed by:  Leona Rodriguez PA-C        Dose:  4 mg   Take 1 capsule (4 mg) by mouth daily   Quantity:  90 capsule   Refills:  4       * Notice:  This list has 2 medication(s) that are the same as other medications prescribed for you. Read the directions carefully, and ask your doctor or  other care provider to review them with you.         Where to get your medicines      These medications were sent to [facility] [place], [place] - [address]  [address], [place] [address]     Phone:  [phone]     tolterodine 4 MG 24 hr capsule                Primary Care Provider Office Phone # Fax #    César Chung [phone] [address]  [place] [address]        Equal Access to Services     [facility]: Hadii aad ku hadasho Soomaali, waaxda luqadaha, qaybta kaalmada adeegyada, waxay idiin hayaan adeeg kharash lasultana . So [facility] [phone].    ATENCIÓN: Si habla español, tiene a contreras disposición servicios gratuitos de asistencia lingüística. [facility] [phone].    We comply with applicable federal civil rights laws and [place] laws. We do not discriminate on the basis of race, color, national origin, age, disability, sex, sexual orientation, or gender identity.            Thank you!     Thank you for choosing [facility]  for your care. Our goal is always to provide you with excellent care. Hearing back from our patients is one way we can continue to improve our services. Please take a few minutes to complete the written survey that you may receive in the mail after your visit with us. Thank you!             Your Updated Medication List - Protect others around you: Learn how to safely use, store and throw away your medicines at www.disposemymeds.org.          This list is accurate as of 6/4/18  1:38 PM.  Always use your most recent med list.                   Brand Name Dispense Instructions for use Diagnosis    acyclovir 400 MG tablet    ZOVIRAX    60 tablet    Take 1 tablet (400 mg) by mouth 2 times daily Viral Prophylaxis.    Multiple myeloma not having achieved remission (H)       aspirin 81 MG chewable tablet      Take 81 mg by mouth daily        dexamethasone 4 MG tablet    DECADRON    40 tablet    Take 10 tablets (40  mg) by mouth once a week Once a week with food on Days 1,8,15 and 22.    Multiple myeloma not having achieved remission (H)       escitalopram 5 MG tablet    LEXAPRO    90 tablet    TAKE 1 TABLET BY MOUTH DAILY    ROBER (generalized anxiety disorder)       IMODIUM A-D PO      Take by mouth as needed        LENalidomide 10 MG Caps capsule CHEMO    REVLIMID    21 capsule    Take 1 capsule (10 mg) by mouth daily for 21 days Take on Days 1 through 21 of 28-day cycle.    Multiple myeloma not having achieved remission (H)       lisinopril 10 MG tablet    PRINIVIL/ZESTRIL    90 tablet    Take 1 tablet (10 mg) by mouth daily    Benign essential hypertension       LORazepam 0.5 MG tablet    ATIVAN    10 tablet    Take 1 tablet (0.5 mg) by mouth every 8 hours as needed (Anxiety, Nausea/Vomiting or Sleep)    Multiple myeloma not having achieved remission (H)       nitroGLYcerin 0.4 MG sublingual tablet    NITROSTAT    25 tablet    For chest pain place 1 tablet under the tongue every 5 minutes for 3 doses. If symptoms persist 5 minutes after 1st dose call 911.    Atypical chest pain       * order for DME     1 Units    Dispense one 4 wheeled walker with hand brakes and seat    Multiple myeloma not having achieved remission (H)       * order for DME     1 Units    Wheelchair.    Need for assistance due to unsteady gait       prochlorperazine 10 MG tablet    COMPAZINE    30 tablet    Take 1 tablet (10 mg) by mouth every 6 hours as needed (Nausea/Vomiting)    Multiple myeloma not having achieved remission (H)       * tolterodine 4 MG 24 hr capsule    DETROL LA    90 capsule    Take 1 capsule (4 mg) by mouth daily    Urinary frequency       * tolterodine 4 MG 24 hr capsule    DETROL LA    90 capsule    Take 1 capsule (4 mg) by mouth daily    OAB (overactive bladder)       ZOMETA IV           * Notice:  This list has 4 medication(s) that are the same as other medications prescribed for you. Read the directions carefully, and ask your  doctor or other care provider to review them with you.

## 2018-06-04 NOTE — PROGRESS NOTES
Oral Chemotherapy Monitoring Program    Primary Oncologist: Dr. Parry  Primary Oncology Clinic: [facility]  Cancer Diagnosis: Multiple Myeloma    Therapy History:  Previously on Revlimid-Dex-Velcade (Rev 2 weeks on 1 week off); Switching on 5/7/18 to Revlimid 3 weeks on 1 week off + dexPreviously on Revlimid-Dex-Velcade (Rev 2 weeks on 1 week off); Switching on 5/7/18 to Revlimid 3 weeks on 1 week off + dex    Drug Interaction Assessment: none    Lab Monitoring Plan: CBC/CMP every 4 weeks    Subjective/Objective:  Quiana Dunaway is a 82 year old female seen in clinic for a follow-up visit for oral chemotherapy.  Briefly met with Quiana today. She is doing very well. She has her medications and is due to restart the next cycle today. She denies all side effects and states that she is doing very well.     ORAL CHEMOTHERAPY 8/14/2017 9/18/2017 10/16/2017 5/7/2018 6/4/2018   Drug Name Revlimid (Lenalidomide) Revlimid (Lenalidomide) Revlimid (Lenalidomide) Revlimid (Lenalidomide) Revlimid (Lenalidomide)   Current Dosage 10mg 10mg 10mg 10mg 10mg   Current Schedule Daily Daily Daily Daily Daily   Cycle Details 2 weeks on 1 week off 2 weeks on 1 week off 2 weeks on 1 week off 3 weeks on 1 week off 3 weeks on 1 week off   Start Date of Last Cycle 8/14/2017 9/5/2017 10/2/2017 5/7/2018 6/4/2018   Planned next cycle start date - 10/2/2017 10/23/2017 6/4/2018 7/2/2018   Doses missed in last 2 weeks - more 0 0 0   Adherence Assessment - Adherent Adherent - Adherent   Adverse Effects - Fatigue Fatigue - No AE identified during assessment   Home BPs - - - - Not applicable   Any new drug interactions? - No No No No   Is the dose as ordered appropriate for the patient? - No No Yes Yes   Is the patient currently in pain? - - - - Assessed in last 30 days.   Has the patient been assessed within the past 6 months for depression? - - - - Yes   Has the patient missed any days of school, work, or other routine activity? - - - - No  "      Last PHQ-2 Score on record:   PHQ-2 ( 1999 [employer]) 10/17/2017 9/20/2016   Q1: Little interest or pleasure in doing things 0 0   Q2: Feeling down, depressed or hopeless 0 0   PHQ-2 Score 0 0       Patient does not report depression symptoms.      Vitals:  BP:   BP Readings from Last 1 Encounters:   06/04/18 149/75     Wt Readings from Last 1 Encounters:   06/04/18 69.4 kg (153 lb)     Estimated body surface area is 1.74 meters squared as calculated from the following:    Height as of an earlier encounter on 6/4/18: 1.575 m (5' 2\").    Weight as of an earlier encounter on 6/4/18: 69.4 kg (153 lb).    Labs:  _  Result Component Current Result Ref Range   Sodium 143 (6/4/2018) 133 - 144 mmol/L     _  Result Component Current Result Ref Range   Potassium 3.4 (6/4/2018) 3.4 - 5.3 mmol/L     _  Result Component Current Result Ref Range   Calcium 8.5 (6/4/2018) 8.5 - 10.1 mg/dL     No results found for Mag within last 30 days.     No results found for Phos within last 30 days.     _  Result Component Current Result Ref Range   Albumin 3.4 (6/4/2018) 3.4 - 5.0 g/dL     _  Result Component Current Result Ref Range   Urea Nitrogen 15 (6/4/2018) 7 - 30 mg/dL     _  Result Component Current Result Ref Range   Creatinine 0.59 (6/4/2018) 0.52 - 1.04 mg/dL       _  Result Component Current Result Ref Range   AST 16 (6/4/2018) 0 - 45 U/L     _  Result Component Current Result Ref Range   ALT 21 (6/4/2018) 0 - 50 U/L     _  Result Component Current Result Ref Range   Bilirubin Total 0.4 (6/4/2018) 0.2 - 1.3 mg/dL       _  Result Component Current Result Ref Range   WBC 5.5 (6/4/2018) 4.0 - 11.0 10e9/L     _  Result Component Current Result Ref Range   Hemoglobin 12.7 (6/4/2018) 11.7 - 15.7 g/dL     _  Result Component Current Result Ref Range   Platelet Count 192 (6/4/2018) 150 - 450 10e9/L     _  Result Component Current Result Ref Range   Absolute Neutrophil 2.9 (6/4/2018) 1.6 - 8.3 10e9/L           Labs are " WNL    Assessment:  Patient is tolerating revlimid very well at this time with no complaints.    Plan:  Continue Revlimid 3 weeks on 1 week off.     Follow-Up:  7/2/18    Refill Due:  7/2/18    Debra Matos PharmD  June 4, 2018

## 2018-06-04 NOTE — LETTER
6/4/2018     RE: Quiana Dunaway  [address] [facility] [address]     Dear Colleague,    Thank you for referring your patient, Quiana Dunaway, to the [facility] UROLOGY CLINIC [place] [facility]. Please see a copy of my visit note below.    CC: Urinary urgency and frequency.    HPI: It is a pleasure to see Quiana Dunaway, an 82 year old female seen in follow up for the above. This problem had been going on for a several months and was progressively worsening. No symptoms  during the day, had nocturia q1-2 hours. There was some urgency associated with symptoms, no urge incontinence. The patient does not wear protective pads. Rare leakage with coughing, sneezing, bending at the waist.  Started on Detrol 3 months ago and notes huge improvement in symptoms. Overall, very satisfied.      Denies any dysuria, gross hematuria, pyuria, sensation of incomplete bladder emptying, needing to strain or Crede to void, history of recent urinary tract infections or kidney stones. The patient does not have any neurologic issues. Denies constipation. Fluid consumption includes water daily, not much caffeine.  Undergoing treatment for MM.     Past Medical History:   Diagnosis Date     HTN (hypertension)      Multiple myeloma not having achieved remission (H) 7/18/2017     Pacemaker      Pancytopenia (H) 6/28/2017     Past Surgical History:   Procedure Laterality Date     BONE MARROW BIOPSY, BONE SPECIMEN, NEEDLE/TROCAR N/A 7/12/2017    Procedure: BIOPSY BONE MARROW;  BONE MARROW BIOPSY ;  Surgeon: Grace Vela MD;  Location:  GI     IMPLANT PACEMAKER  03/2017    AV block     Current Outpatient Prescriptions   Medication Sig Dispense Refill     acyclovir (ZOVIRAX) 400 MG tablet Take 1 tablet (400 mg) by mouth 2 times daily Viral Prophylaxis. 60 tablet 5     aspirin 81 MG chewable tablet Take 81 mg by mouth daily        calcium carbonate-vitamin D  "600-400 MG-UNIT CHEW Take 1 chew tab by mouth 2 times daily 180 tablet 3     dexamethasone (DECADRON) 4 MG tablet Take 10 tablets (40 mg) by mouth once a week Once a week with food on Days 1,8,15 and 22. 40 tablet 0     dexamethasone (DECADRON) 4 MG tablet Take 10 tablets (40 mg) by mouth once a week Once a week with food on Days 1,8,15 and 22. 40 tablet 0     escitalopram (LEXAPRO) 5 MG tablet TAKE 1 TABLET BY MOUTH DAILY 90 tablet 1     LENalidomide (REVLIMID) 10 MG CAPS capsule CHEMO Take 1 capsule (10 mg) by mouth daily for 21 days Take on Days 1 through 21 of 28-day cycle. 21 capsule 0     lisinopril (PRINIVIL/ZESTRIL) 10 MG tablet Take 1 tablet (10 mg) by mouth daily 90 tablet 3     Loperamide HCl (IMODIUM A-D PO) Take by mouth as needed       LORazepam (ATIVAN) 0.5 MG tablet Take 1 tablet (0.5 mg) by mouth every 8 hours as needed (Anxiety, Nausea/Vomiting or Sleep) 10 tablet 3     nitroglycerin (NITROSTAT) 0.4 MG sublingual tablet For chest pain place 1 tablet under the tongue every 5 minutes for 3 doses. If symptoms persist 5 minutes after 1st dose call 911. 25 tablet 0     order for DME Wheelchair. 1 Units 0     order for DME Dispense one 4 wheeled walker with hand brakes and seat 1 Units 0     prochlorperazine (COMPAZINE) 10 MG tablet Take 1 tablet (10 mg) by mouth every 6 hours as needed (Nausea/Vomiting) 30 tablet 3     tolterodine (DETROL LA) 4 MG 24 hr capsule Take 1 capsule (4 mg) by mouth daily 90 capsule 3     Zoledronic Acid (ZOMETA IV)        No Known Allergies  Family History: There is no h/o  malignancy.  There is no h/o urolithiasis.    Social History: The patient does not smoke cigarettes. Denies EtOH and illicit drug use.      ROS: A comprehensive 14 point ROS was obtained and otherwise negative except for that outlined above in the HPI.    PHYSICAL EXAM:   /72 (BP Location: Right arm, Patient Position: Chair, Cuff Size: Adult Regular)  Pulse 60  Ht 1.575 m (5' 2\")  Wt 70.3 kg (155 " lb)  BMI 28.35 kg/m2  GENERAL: Well groomed, well developed, well nourished female in NAD.  HEENT: EOMI, AT, NC.  SKIN: Warm to touch, dry.  No visible rashes or lesions on examined areas.  RESP: No increased respiratory effort.   LYMPH: No LE edema.  ABD: Soft, NT, ND.  No CVAT bilaterally.  MS: Full ROM in extremities.  NEURO: Alert and oriented x 3.  PSYCH: Normal mood and affect, pleasant and agreeable during interview and exam.    U/A: unable    IMAGING: None    ASSESSMENT/PLAN:  Ms. Quiana Dunaway is a 82 year old female with nocturia, improved on Detrol.   - Detrol LA 4mg. Rx sent to patient's pharmacy. Side effects again discussed (not having any).  - Reviewed behavioral therapies, such as timed voiding, pelvic floor relaxation while voiding and not voiding in a hurry.   -Premarin (Pt would like to think about it), this would treat atrophic vaginitis and urethral changes which may impact her pelvic discomfort and improve her urinary urgency      Follow up in 12 months for reassessment or sooner with concerns.      Will consider urodynamics and intravesical examination with cystoscopy with Dr. Alvarado in the future if the patient fails medication therapy.      Leona Rodriguez PA-C  [facility] Urology    10 min spent face to face with the patient, >50% of that time spent on counseling and coordination of care.

## 2018-06-04 NOTE — LETTER
6/4/2018         RE: Quiana Dunaway  [address] [facility] [address]        Dear Colleague,    Thank you for referring your patient, Quiana Dunaway, to the [facility] CANCER [facility]. Please see a copy of my visit note below.    Medical Assistant Note:  Quiana Dunaway presents today for lab visit.    Patient seen by provider today: Yes: Dr. Parry.   present during visit today: Not Applicable.    Concerns: No Concerns.    Procedure:  Lab draw site: LAC, Needle type: BF, Gauge: 21 g gauze and coban applied.    Post Assessment:  Labs drawn without difficulty: Yes.    Discharge Plan:  Departure Mode: Ambulatory.    Face to Face Time: 4.    Isabel Sarmiento MA              Again, thank you for allowing me to participate in the care of your patient.        Sincerely,        Oncology Nurse

## 2018-06-04 NOTE — MR AVS SNAPSHOT
After Visit Summary   6/4/2018    Quiana Dunaway    MRN: [id number]           Patient Information     Date Of Birth          1935        Visit Information        Provider Department      6/4/2018 2:20 PM Alex Parry MD [facility]        Care Instructions    Continue revlimid and dexamethasone.  Follow up in 4-5 weeks.  Hold zometa this month. Give it next month.          Follow-ups after your visit        Your next 10 appointments already scheduled     Jun 18, 2018 12:30 PM CDT   Office Visit with César Chung MD   [facility]    [address] [facility] [address]   [phone]           Bring a current list of meds and any records pertaining to this visit. For Physicals, please bring immunization records and any forms needing to be filled out. Please arrive 10 minutes early to complete paperwork.            Jul 09, 2018  2:00 PM CDT   Return Visit with  Oncology Nurse   [facility] ([facility])    [facility]  [address] [facility] [address]  [facility] [address]   [phone]            Jul 09, 2018  3:00 PM CDT   Return Visit with Alex Parry MD   [facility] ([facility])    [facility]  [address] [facility] [address]  [facility] [address] [phone]              Who to contact     If you have questions or need follow up information about today's clinic visit or your schedule please contact [facility] directly at [phone].  Normal or non-critical lab and imaging results will be communicated to you by MyChart, letter or phone within 4 business days after the clinic has received the results. If you do not hear from us within 7 days, please contact the clinic through MyChart or phone. If you have a critical or abnormal lab result, we will notify you by phone as soon as possible.  Submit refill requests through [email or web address] or  call your pharmacy and they will forward the refill request to us. Please allow 3 business days for your refill to be completed.          Additional Information About Your Visit        VersionOnehart Information     [employer] gives you secure access to your electronic health record. If you see a primary care provider, you can also send messages to your care team and make appointments. If you have questions, please call your primary care clinic.  If you do not have a primary care provider, please call [phone] and they will assist you.        Care EveryWhere ID     This is your Care EveryWhere ID. This could be used by other organizations to access your [place] medical records  [id number]        Your Vitals Were     Pulse Temperature Respirations Pulse Oximetry BMI (Body Mass Index)       82 98.8  F (37.1  C) (Oral) 16 96% 27.98 kg/m2        Blood Pressure from Last 3 Encounters:   06/04/18 149/75   06/04/18 120/72   05/07/18 123/57    Weight from Last 3 Encounters:   06/04/18 69.4 kg (153 lb)   06/04/18 70.3 kg (155 lb)   05/07/18 70.3 kg (155 lb)              Today, you had the following     No orders found for display         Today's Medication Changes          These changes are accurate as of 6/4/18  2:58 PM.  If you have any questions, ask your nurse or doctor.               These medicines have changed or have updated prescriptions.        Dose/Directions    * tolterodine 4 MG 24 hr capsule   Commonly known as:  DETROL LA   This may have changed:  Another medication with the same name was added. Make sure you understand how and when to take each.   Used for:  Urinary frequency   Changed by:  Leona Rodriguez PA-C        Dose:  4 mg   Take 1 capsule (4 mg) by mouth daily   Quantity:  90 capsule   Refills:  3       * tolterodine 4 MG 24 hr capsule   Commonly known as:  DETROL LA   This may have changed:  You were already taking a medication with the same name, and this prescription was added. Make sure you  understand how and when to take each.   Used for:  OAB (overactive bladder)   Changed by:  Leona Rodriguez PA-C        Dose:  4 mg   Take 1 capsule (4 mg) by mouth daily   Quantity:  90 capsule   Refills:  4       * Notice:  This list has 2 medication(s) that are the same as other medications prescribed for you. Read the directions carefully, and ask your doctor or other care provider to review them with you.         Where to get your medicines      These medications were sent to [facility] [place], [place] - [address]  [address], [place] [address]     Phone:  [phone]     tolterodine 4 MG 24 hr capsule                Primary Care Provider Office Phone # Fax #    César Chung [phone] [address]  [facility] [address]        Equal Access to Services     [facility]: Hadii aad ku hadasho Soomaali, waaxda luqadaha, qaybta kaalmada adeegyada, waxay karenin haymarnien carlos rodriguez . So [facility] [phone].    ATENCIÓN: Si habla español, tiene a contreras disposición servicios gratuitos de asistencia lingüística. Llame al [phone].    We comply with applicable federal civil rights laws and [place] laws. We do not discriminate on the basis of race, color, national origin, age, disability, sex, sexual orientation, or gender identity.            Thank you!     Thank you for choosing [facility]  for your care. Our goal is always to provide you with excellent care. Hearing back from our patients is one way we can continue to improve our services. Please take a few minutes to complete the written survey that you may receive in the mail after your visit with us. Thank you!             Your Updated Medication List - Protect others around you: Learn how to safely use, store and throw away your medicines at www.disposemymeds.org.          This list is accurate as of 6/4/18  2:58 PM.  Always use your most recent med list.                    Brand Name Dispense Instructions for use Diagnosis    acyclovir 400 MG tablet    ZOVIRAX    60 tablet    Take 1 tablet (400 mg) by mouth 2 times daily Viral Prophylaxis.    Multiple myeloma not having achieved remission (H)       aspirin 81 MG chewable tablet      Take 81 mg by mouth daily        dexamethasone 4 MG tablet    DECADRON    40 tablet    Take 10 tablets (40 mg) by mouth once a week Once a week with food on Days 1,8,15 and 22.    Multiple myeloma not having achieved remission (H)       escitalopram 5 MG tablet    LEXAPRO    90 tablet    TAKE 1 TABLET BY MOUTH DAILY    ROBER (generalized anxiety disorder)       IMODIUM A-D PO      Take by mouth as needed        LENalidomide 10 MG Caps capsule CHEMO    REVLIMID    21 capsule    Take 1 capsule (10 mg) by mouth daily for 21 days Take on Days 1 through 21 of 28-day cycle.    Multiple myeloma not having achieved remission (H)       lisinopril 10 MG tablet    PRINIVIL/ZESTRIL    90 tablet    Take 1 tablet (10 mg) by mouth daily    Benign essential hypertension       LORazepam 0.5 MG tablet    ATIVAN    10 tablet    Take 1 tablet (0.5 mg) by mouth every 8 hours as needed (Anxiety, Nausea/Vomiting or Sleep)    Multiple myeloma not having achieved remission (H)       nitroGLYcerin 0.4 MG sublingual tablet    NITROSTAT    25 tablet    For chest pain place 1 tablet under the tongue every 5 minutes for 3 doses. If symptoms persist 5 minutes after 1st dose call 911.    Atypical chest pain       * order for DME     1 Units    Dispense one 4 wheeled walker with hand brakes and seat    Multiple myeloma not having achieved remission (H)       * order for DME     1 Units    Wheelchair.    Need for assistance due to unsteady gait       prochlorperazine 10 MG tablet    COMPAZINE    30 tablet    Take 1 tablet (10 mg) by mouth every 6 hours as needed (Nausea/Vomiting)    Multiple myeloma not having achieved remission (H)       * tolterodine 4 MG 24 hr capsule    DETROL LA    90  capsule    Take 1 capsule (4 mg) by mouth daily    Urinary frequency       * tolterodine 4 MG 24 hr capsule    DETROL LA    90 capsule    Take 1 capsule (4 mg) by mouth daily    OAB (overactive bladder)       ZOMETA IV           * Notice:  This list has 4 medication(s) that are the same as other medications prescribed for you. Read the directions carefully, and ask your doctor or other care provider to review them with you.

## 2018-06-04 NOTE — PROGRESS NOTES
Medical Assistant Note:  Quiana Dunaway presents today for lab visit.    Patient seen by provider today: Yes: Dr. Parry.   present during visit today: Not Applicable.    Concerns: No Concerns.    Procedure:  Lab draw site: LAC, Needle type: BF, Gauge: 21 g gauze and coban applied.    Post Assessment:  Labs drawn without difficulty: Yes.    Discharge Plan:  Departure Mode: Ambulatory.    Face to Face Time: 4.    Isabel Sarmiento MA

## 2018-06-04 NOTE — PATIENT INSTRUCTIONS
Continue revlimid and dexamethasone.  Follow up in 4-5 weeks.   Scheduled - Tiera  Hold zometa this month. Give it next month.    AVS given to patient- Tiera    6/11 Message sent to schedulers to schedule Zometa -AG

## 2018-06-04 NOTE — MR AVS SNAPSHOT
After Visit Summary   6/4/2018    Quiana Dunaway    MRN: [id number]           Patient Information     Date Of Birth          1935        Visit Information        Provider Department      6/4/2018 1:00 PM Leona Rodriguez PA-C [facility]        Today's Diagnoses     OAB (overactive bladder)    -  1       Follow-ups after your visit        Follow-up notes from your care team     Return in about 1 year (around 6/4/2019).      Your next 10 appointments already scheduled     Jun 04, 2018  2:20 PM CDT   Return Visit with Alex Parry MD   [facility]    [facility]  [address] [id number] [phone]            Jun 18, 2018 12:30 PM CDT   Office Visit with César Chung MD   [facility]    [address] [facility] [id number] [phone]           Bring a current list of meds and any records pertaining to this visit. For Physicals, please bring immunization records and any forms needing to be filled out. Please arrive 10 minutes early to complete paperwork.              Who to contact     If you have questions or need follow up information about today's clinic visit or your schedule please contact [facility] directly at [phone].  Normal or non-critical lab and imaging results will be communicated to you by MyChart, letter or phone within 4 business days after the clinic has received the results. If you do not hear from us within 7 days, please contact the clinic through MyChart or phone. If you have a critical or abnormal lab result, we will notify you by phone as soon as possible.  Submit refill requests through Wyzerr or call your pharmacy and they will forward the refill request to us. Please allow 3 business days for your refill to be completed.          Additional  "Information About Your Visit        2nd Watchhart Information     [employer] gives you secure access to your electronic health record. If you see a primary care provider, you can also send messages to your care team and make appointments. If you have questions, please call your primary care clinic.  If you do not have a primary care provider, please call [phone] and they will assist you.        Care EveryWhere ID     This is your Care EveryWhere ID. This could be used by other organizations to access your [place] medical records  [id number]        Your Vitals Were     Pulse Height BMI (Body Mass Index)             60 1.575 m (5' 2\") 28.35 kg/m2          Blood Pressure from Last 3 Encounters:   06/04/18 120/72   05/07/18 123/57   04/30/18 140/67    Weight from Last 3 Encounters:   06/04/18 70.3 kg (155 lb)   05/07/18 70.3 kg (155 lb)   04/30/18 70.5 kg (155 lb 6.4 oz)              Today, you had the following     No orders found for display         Today's Medication Changes          These changes are accurate as of 6/4/18  1:41 PM.  If you have any questions, ask your nurse or doctor.               These medicines have changed or have updated prescriptions.        Dose/Directions    * tolterodine 4 MG 24 hr capsule   Commonly known as:  DETROL LA   This may have changed:  Another medication with the same name was added. Make sure you understand how and when to take each.   Used for:  Urinary frequency   Changed by:  Leona Rodriguez PA-C        Dose:  4 mg   Take 1 capsule (4 mg) by mouth daily   Quantity:  90 capsule   Refills:  3       * tolterodine 4 MG 24 hr capsule   Commonly known as:  DETROL LA   This may have changed:  You were already taking a medication with the same name, and this prescription was added. Make sure you understand how and when to take each.   Used for:  OAB (overactive bladder)   Changed by:  Leona Rodriguez PA-C        Dose:  4 mg   Take 1 capsule (4 mg) by mouth daily   Quantity:  90 " capsule   Refills:  4       * Notice:  This list has 2 medication(s) that are the same as other medications prescribed for you. Read the directions carefully, and ask your doctor or other care provider to review them with you.         Where to get your medicines      These medications were sent to [facility] [place], [place] - [address]  [address], [place] [address]     Phone:  [phone]     tolterodine 4 MG 24 hr capsule                Primary Care Provider Office Phone # Fax #    César Chung [phone] [address]  [place] [address]        Equal Access to Services     [facility]: Hadii althea hogan Sonellie, waaxda luqadaha, qaybta kaalmada adedenzelyabertha, pho[place] rodriguez . So [facility] [phone].    ATENCIÓN: Si habla español, tiene a contreras disposición servicios gratuitos de asistencia lingüística. Llame al [phone].    We comply with applicable federal civil rights laws and [place] laws. We do not discriminate on the basis of race, color, national origin, age, disability, sex, sexual orientation, or gender identity.            Thank you!     Thank you for choosing [facility]OLOGY CLINIC [place]  for your care. Our goal is always to provide you with excellent care. Hearing back from our patients is one way we can continue to improve our services. Please take a few minutes to complete the written survey that you may receive in the mail after your visit with us. Thank you!             Your Updated Medication List - Protect others around you: Learn how to safely use, store and throw away your medicines at www.disposemymeds.org.          This list is accurate as of 6/4/18  1:41 PM.  Always use your most recent med list.                   Brand Name Dispense Instructions for use Diagnosis    acyclovir 400 MG tablet    ZOVIRAX    60 tablet    Take 1 tablet (400 mg) by mouth 2 times daily Viral  Prophylaxis.    Multiple myeloma not having achieved remission (H)       aspirin 81 MG chewable tablet      Take 81 mg by mouth daily        dexamethasone 4 MG tablet    DECADRON    40 tablet    Take 10 tablets (40 mg) by mouth once a week Once a week with food on Days 1,8,15 and 22.    Multiple myeloma not having achieved remission (H)       escitalopram 5 MG tablet    LEXAPRO    90 tablet    TAKE 1 TABLET BY MOUTH DAILY    ROBER (generalized anxiety disorder)       IMODIUM A-D PO      Take by mouth as needed        LENalidomide 10 MG Caps capsule CHEMO    REVLIMID    21 capsule    Take 1 capsule (10 mg) by mouth daily for 21 days Take on Days 1 through 21 of 28-day cycle.    Multiple myeloma not having achieved remission (H)       lisinopril 10 MG tablet    PRINIVIL/ZESTRIL    90 tablet    Take 1 tablet (10 mg) by mouth daily    Benign essential hypertension       LORazepam 0.5 MG tablet    ATIVAN    10 tablet    Take 1 tablet (0.5 mg) by mouth every 8 hours as needed (Anxiety, Nausea/Vomiting or Sleep)    Multiple myeloma not having achieved remission (H)       nitroGLYcerin 0.4 MG sublingual tablet    NITROSTAT    25 tablet    For chest pain place 1 tablet under the tongue every 5 minutes for 3 doses. If symptoms persist 5 minutes after 1st dose call 911.    Atypical chest pain       * order for DME     1 Units    Dispense one 4 wheeled walker with hand brakes and seat    Multiple myeloma not having achieved remission (H)       * order for DME     1 Units    Wheelchair.    Need for assistance due to unsteady gait       prochlorperazine 10 MG tablet    COMPAZINE    30 tablet    Take 1 tablet (10 mg) by mouth every 6 hours as needed (Nausea/Vomiting)    Multiple myeloma not having achieved remission (H)       * tolterodine 4 MG 24 hr capsule    DETROL LA    90 capsule    Take 1 capsule (4 mg) by mouth daily    Urinary frequency       * tolterodine 4 MG 24 hr capsule    DETROL LA    90 capsule    Take 1 capsule (4  mg) by mouth daily    OAB (overactive bladder)       ZOMETA IV           * Notice:  This list has 4 medication(s) that are the same as other medications prescribed for you. Read the directions carefully, and ask your doctor or other care provider to review them with you.

## 2018-06-08 ENCOUNTER — TELEPHONE (OUTPATIENT)
Dept: UROLOGY | Facility: CLINIC | Age: 83
End: 2018-06-08

## 2018-06-08 NOTE — TELEPHONE ENCOUNTER
Shahida from  Pharmacy called  To report Detrol LA was originally ordered but her insurance did not cover this medication. Verbal order for 25 mg Myrbertiq  wsa given and patient has been taking this drug. A refill was sent to pharmacy to refill the Detrol LA  However they did not fill this since again it is not covered on her insurance she has not been taking this drug. They did refill the Myrbetriq  Which still has additional refills. Her medication list was updated and Myrbetriq added to her list. Isabel Bullard LPN

## 2018-06-18 ENCOUNTER — OFFICE VISIT (OUTPATIENT)
Dept: FAMILY MEDICINE | Facility: CLINIC | Age: 83
End: 2018-06-18
Payer: MEDICARE

## 2018-06-18 VITALS
HEIGHT: 62 IN | HEART RATE: 66 BPM | SYSTOLIC BLOOD PRESSURE: 122 MMHG | DIASTOLIC BLOOD PRESSURE: 66 MMHG | OXYGEN SATURATION: 100 % | TEMPERATURE: 99.2 F | BODY MASS INDEX: 28.16 KG/M2 | WEIGHT: 153 LBS

## 2018-06-18 DIAGNOSIS — I10 BENIGN ESSENTIAL HYPERTENSION: ICD-10-CM

## 2018-06-18 DIAGNOSIS — C90.00 MULTIPLE MYELOMA NOT HAVING ACHIEVED REMISSION (H): Primary | ICD-10-CM

## 2018-06-18 DIAGNOSIS — F41.1 GAD (GENERALIZED ANXIETY DISORDER): ICD-10-CM

## 2018-06-18 DIAGNOSIS — R53.83 OTHER FATIGUE: ICD-10-CM

## 2018-06-18 PROCEDURE — 99213 OFFICE O/P EST LOW 20 MIN: CPT | Performed by: INTERNAL MEDICINE

## 2018-06-18 NOTE — PROGRESS NOTES
SUBJECTIVE:   Quiana Dunaway is a 82 year old female who presents to clinic today for the following health issues:    Hypertension Follow-up      Outpatient blood pressures are not being checked.    Low Salt Diet: low salt      Mrs Dunaway returns for routine follow up   She is being treated for MM by Dr. Parry and responding to therapy  She feels well without the exception of mild fatigue   She reports a good mood and thinks her lexapro is helping  She has no new complaints     Problem list and histories reviewed & adjusted, as indicated.  Additional history: as documented    Patient Active Problem List   Diagnosis     Benign essential hypertension     Pancytopenia (H)     Multiple myeloma not having achieved remission (H)     Benzodiazepine overdose, accidental or unintentional, initial encounter     Atrial fibrillation, unspecified type (H)     Hypokalemia     Pacemaker     Past Surgical History:   Procedure Laterality Date     BONE MARROW BIOPSY, BONE SPECIMEN, NEEDLE/TROCAR N/A 7/12/2017    Procedure: BIOPSY BONE MARROW;  BONE MARROW BIOPSY ;  Surgeon: Grace Vela MD;  Location:  GI     IMPLANT PACEMAKER  03/2017    AV block       Social History   Substance Use Topics     Smoking status: Never Smoker     Smokeless tobacco: Never Used     Alcohol use No     Family History   Problem Relation Age of Onset     Unknown/Adopted Mother      Unknown/Adopted Father          Current Outpatient Prescriptions   Medication Sig Dispense Refill     acyclovir (ZOVIRAX) 400 MG tablet Take 1 tablet (400 mg) by mouth 2 times daily Viral Prophylaxis. 60 tablet 5     aspirin 81 MG chewable tablet Take 81 mg by mouth daily        dexamethasone (DECADRON) 4 MG tablet Take 10 tablets (40 mg) by mouth once a week Once a week with food on Days 1,8,15 and 22. 40 tablet 0     escitalopram (LEXAPRO) 5 MG tablet TAKE 1 TABLET BY MOUTH DAILY 90 tablet 1     LENalidomide (REVLIMID) 10 MG CAPS capsule CHEMO Take 1 capsule  "(10 mg) by mouth daily for 21 days Take on Days 1 through 21 of 28-day cycle. 21 capsule 0     lisinopril (PRINIVIL/ZESTRIL) 10 MG tablet Take 1 tablet (10 mg) by mouth daily 90 tablet 3     Loperamide HCl (IMODIUM A-D PO) Take by mouth as needed       LORazepam (ATIVAN) 0.5 MG tablet Take 1 tablet (0.5 mg) by mouth every 8 hours as needed (Anxiety, Nausea/Vomiting or Sleep) 10 tablet 3     Mirabegron (MYRBETRIQ PO) Take 25 mg by mouth daily       nitroglycerin (NITROSTAT) 0.4 MG sublingual tablet For chest pain place 1 tablet under the tongue every 5 minutes for 3 doses. If symptoms persist 5 minutes after 1st dose call 911. 25 tablet 0     order for DME Wheelchair. 1 Units 0     order for DME Dispense one 4 wheeled walker with hand brakes and seat 1 Units 0     prochlorperazine (COMPAZINE) 10 MG tablet Take 1 tablet (10 mg) by mouth every 6 hours as needed (Nausea/Vomiting) 30 tablet 3     Zoledronic Acid (ZOMETA IV)        No Known Allergies    Reviewed and updated as needed this visit by clinical staff       Reviewed and updated as needed this visit by Provider         ROS:  Constitutional, HEENT, cardiovascular, pulmonary, gi and gu systems are negative, except as otherwise noted.    OBJECTIVE:     /66 (BP Location: Right arm, Cuff Size: Adult Regular)  Pulse 66  Temp 99.2  F (37.3  C) (Tympanic)  Ht 5' 2\" (1.575 m)  Wt 153 lb (69.4 kg)  SpO2 100%  Breastfeeding? No  BMI 27.98 kg/m2  Body mass index is 27.98 kg/(m^2).  GENERAL: healthy, alert and no distress  EYES: Eyes grossly normal to inspection, PERRL and conjunctivae and sclerae normal  HENT: ear canals and TM's normal, nose and mouth without ulcers or lesions  NECK: no adenopathy, no asymmetry, masses, or scars and thyroid normal to palpation  RESP: lungs clear to auscultation - no rales, rhonchi or wheezes  CV: Heart with regular rate and rhythm.   MS: no gross musculoskeletal defects noted, no edema  NEURO: Normal strength and tone, " mentation intact and speech normal  PSYCH: mentation appears normal, affect normal/bright    TSH pending     ASSESSMENT/PLAN:       1. Multiple myeloma not having achieved remission (H)  Continue follow up with Dr. Parry     2. Benign essential hypertension  Well controlled     3. Other fatigue  Rule out thyroid disease as cause of non-specific fatigue with future lab draws  - **TSH with free T4 reflex FUTURE anytime; Future    Shingrix recommended       3 month follow up tda    César Chung MD  [facility]

## 2018-06-18 NOTE — MR AVS SNAPSHOT
"              After Visit Summary   6/18/2018    Quiana Dunaway    MRN: [id number]           Patient Information     Date Of Birth          1935        Visit Information        Provider Department      6/18/2018 12:30 PM César Chung MD [facility] [place]        Care Instructions    You should get the new shingles vaccine \"SHINGRIX\" (not Zostavax) at your pharmacy.     I ordered a thyroid gland blood test to be added to your next lab draw at the [email or web address] oncology clinic.  Low thyroid gland can cause fatigue.              Follow-ups after your visit        Follow-up notes from your care team     Return in about 6 months (around 12/18/2018) for Routine Visit.      Your next 10 appointments already scheduled     Jul 09, 2018  2:00 PM CDT   Return Visit with  Oncology Nurse   [facility]    [facility]  [address]  [facility] [address]   [phone]            Jul 09, 2018  3:00 PM CDT   Return Visit with Alex Parry MD   [facility]    [facility]  [address]  [place] [address]   [phone]            Jul 09, 2018  3:30 PM CDT   Level 1 with  INFUSION CHAIR 8   [facility]    [facility]  [address]  [facility] [address]   [phone]              Who to contact     If you have questions or need follow up information about today's clinic visit or your schedule please contact [facility] directly at [phone].  Normal or non-critical lab and imaging results will be communicated to you by MyChart, letter or phone within 4 business days after the clinic has received the results. If you do not hear from us within 7 days, please contact the clinic through MyChart or phone. If you have a critical or abnormal lab result, we will notify you by " "phone as soon as possible.  Submit refill requests through [employer] or call your pharmacy and they will forward the refill request to us. Please allow 3 business days for your refill to be completed.          Additional Information About Your Visit        [email or web address] Information     [employer] gives you secure access to your electronic health record. If you see a primary care provider, you can also send messages to your care team and make appointments. If you have questions, please call your primary care clinic.  If you do not have a primary care provider, please call [phone] and they will assist you.        Care EveryWhere ID     This is your Care EveryWhere ID. This could be used by other organizations to access your [facility] medical records  [id number]        Your Vitals Were     Pulse Temperature Height Pulse Oximetry Breastfeeding? BMI (Body Mass Index)    66 99.2  F (37.3  C) (Tympanic) 5' 2\" (1.575 m) 100% No 27.98 kg/m2       Blood Pressure from Last 3 Encounters:   06/18/18 122/66   06/04/18 149/75   06/04/18 120/72    Weight from Last 3 Encounters:   06/18/18 153 lb (69.4 kg)   06/04/18 153 lb (69.4 kg)   06/04/18 155 lb (70.3 kg)              Today, you had the following     No orders found for display       Primary Care Provider Office Phone # Fax #    César Chung [phone] [address]  [place] [address]        Equal Access to Services     [facility] Hospital[facility] : Hadii aad ku hadasho Soomaali, waaxda luqadaha, qaybta kaalmada adeegyabertha, phoenix rodriguez . So [facility] [phone].    ATENCIÓN: Si habla español, tiene a contreras disposición servicios gratuitos de asistencia lingüística. Chad al [phone].    We comply with applicable federal civil rights laws and [place] laws. We do not discriminate on the basis of race, color, national origin, age, disability, sex, sexual orientation, or gender identity.            Thank you!     Thank you for choosing " [facility]  for your care. Our goal is always to provide you with excellent care. Hearing back from our patients is one way we can continue to improve our services. Please take a few minutes to complete the written survey that you may receive in the mail after your visit with us. Thank you!             Your Updated Medication List - Protect others around you: Learn how to safely use, store and throw away your medicines at www.disposemymeds.org.          This list is accurate as of 6/18/18  1:06 PM.  Always use your most recent med list.                   Brand Name Dispense Instructions for use Diagnosis    acyclovir 400 MG tablet    ZOVIRAX    60 tablet    Take 1 tablet (400 mg) by mouth 2 times daily Viral Prophylaxis.    Multiple myeloma not having achieved remission (H)       aspirin 81 MG chewable tablet      Take 81 mg by mouth daily        dexamethasone 4 MG tablet    DECADRON    40 tablet    Take 10 tablets (40 mg) by mouth once a week Once a week with food on Days 1,8,15 and 22.    Multiple myeloma not having achieved remission (H)       escitalopram 5 MG tablet    LEXAPRO    90 tablet    TAKE 1 TABLET BY MOUTH DAILY    ROBER (generalized anxiety disorder)       IMODIUM A-D PO      Take by mouth as needed        LENalidomide 10 MG Caps capsule CHEMO    REVLIMID    21 capsule    Take 1 capsule (10 mg) by mouth daily for 21 days Take on Days 1 through 21 of 28-day cycle.    Multiple myeloma not having achieved remission (H)       lisinopril 10 MG tablet    PRINIVIL/ZESTRIL    90 tablet    Take 1 tablet (10 mg) by mouth daily    Benign essential hypertension       LORazepam 0.5 MG tablet    ATIVAN    10 tablet    Take 1 tablet (0.5 mg) by mouth every 8 hours as needed (Anxiety, Nausea/Vomiting or Sleep)    Multiple myeloma not having achieved remission (H)       MYRBETRIQ PO      Take 25 mg by mouth daily        nitroGLYcerin 0.4 MG sublingual tablet    NITROSTAT    25 tablet    For chest pain place  1 tablet under the tongue every 5 minutes for 3 doses. If symptoms persist 5 minutes after 1st dose call 911.    Atypical chest pain       * order for DME     1 Units    Dispense one 4 wheeled walker with hand brakes and seat    Multiple myeloma not having achieved remission (H)       * order for DME     1 Units    Wheelchair.    Need for assistance due to unsteady gait       prochlorperazine 10 MG tablet    COMPAZINE    30 tablet    Take 1 tablet (10 mg) by mouth every 6 hours as needed (Nausea/Vomiting)    Multiple myeloma not having achieved remission (H)       ZOMETA IV           * Notice:  This list has 2 medication(s) that are the same as other medications prescribed for you. Read the directions carefully, and ask your doctor or other care provider to review them with you.

## 2018-06-18 NOTE — PATIENT INSTRUCTIONS
"You should get the new shingles vaccine \"SHINGRIX\" (not Zostavax) at your pharmacy.     I ordered a thyroid gland blood test to be added to your next lab draw at the [facility] oncology clinic.  Low thyroid gland can cause fatigue.      "

## 2018-06-20 RX ORDER — ESCITALOPRAM OXALATE 5 MG/1
TABLET ORAL
Qty: 90 TABLET | Refills: 0 | Status: ON HOLD | OUTPATIENT
Start: 2018-06-20 | End: 2020-05-25

## 2018-06-20 NOTE — TELEPHONE ENCOUNTER
"Requested Prescriptions   Pending Prescriptions Disp Refills     escitalopram (LEXAPRO) 5 MG tablet [Pharmacy Med Name: ESCITALOPRAM 5MG TABLETS] 90 tablet 0     Sig: TAKE 1 TABLET BY MOUTH DAILY    SSRIs Protocol Passed    6/18/2018 12:41 PM       Passed - Recent (12 mo) or future (30 days) visit within the authorizing provider's specialty    Patient had office visit in the last 12 months or has a visit in the next 30 days with authorizing provider or within the authorizing provider's specialty.  See \"Patient Info\" tab in inbasket, or \"Choose Columns\" in Meds & Orders section of the refill encounter.           Passed - Patient is age 18 or older       Passed - No active pregnancy on record       Passed - No positive pregnancy test in last 12 months        Last OV 6/18/18 with PCP, follow up 3 months  Grace LIGHT RN    "

## 2018-06-26 RX ORDER — LENALIDOMIDE 10 MG/1
10 CAPSULE ORAL DAILY
Qty: 21 CAPSULE | Refills: 0 | Status: SHIPPED | OUTPATIENT
Start: 2018-06-26 | End: 2018-12-26

## 2018-06-26 RX ORDER — DEXAMETHASONE 4 MG/1
40 TABLET ORAL WEEKLY
Qty: 40 TABLET | Refills: 0 | Status: SHIPPED | OUTPATIENT
Start: 2018-06-26 | End: 2018-07-09

## 2018-07-09 ENCOUNTER — ONCOLOGY VISIT (OUTPATIENT)
Dept: ONCOLOGY | Facility: CLINIC | Age: 83
End: 2018-07-09
Attending: INTERNAL MEDICINE
Payer: MEDICARE

## 2018-07-09 ENCOUNTER — HOSPITAL ENCOUNTER (OUTPATIENT)
Facility: CLINIC | Age: 83
Setting detail: SPECIMEN
Discharge: HOME | End: 2018-07-09
Attending: INTERNAL MEDICINE | Admitting: INTERNAL MEDICINE
Payer: MEDICARE

## 2018-07-09 ENCOUNTER — INFUSION THERAPY VISIT (OUTPATIENT)
Dept: INFUSION THERAPY | Facility: CLINIC | Age: 83
End: 2018-07-09
Attending: INTERNAL MEDICINE
Payer: MEDICARE

## 2018-07-09 ENCOUNTER — DOCUMENTATION ONLY (OUTPATIENT)
Dept: PHARMACY | Facility: CLINIC | Age: 83
End: 2018-07-09

## 2018-07-09 VITALS
SYSTOLIC BLOOD PRESSURE: 118 MMHG | WEIGHT: 150 LBS | HEART RATE: 76 BPM | OXYGEN SATURATION: 94 % | DIASTOLIC BLOOD PRESSURE: 61 MMHG | BODY MASS INDEX: 27.6 KG/M2 | TEMPERATURE: 97.9 F | HEIGHT: 62 IN

## 2018-07-09 DIAGNOSIS — R53.83 FATIGUE, UNSPECIFIED TYPE: ICD-10-CM

## 2018-07-09 DIAGNOSIS — C90.00 MULTIPLE MYELOMA NOT HAVING ACHIEVED REMISSION (H): Primary | ICD-10-CM

## 2018-07-09 DIAGNOSIS — D61.818 PANCYTOPENIA (H): ICD-10-CM

## 2018-07-09 DIAGNOSIS — E87.6 HYPOKALEMIA: ICD-10-CM

## 2018-07-09 LAB
ALBUMIN SERPL-MCNC: 3.6 G/DL (ref 3.4–5)
ALP SERPL-CCNC: 79 U/L (ref 40–150)
ALT SERPL W P-5'-P-CCNC: 21 U/L (ref 0–50)
ANION GAP SERPL CALCULATED.3IONS-SCNC: 4 MMOL/L (ref 3–14)
AST SERPL W P-5'-P-CCNC: 15 U/L (ref 0–45)
BILIRUB SERPL-MCNC: 0.6 MG/DL (ref 0.2–1.3)
BUN SERPL-MCNC: 15 MG/DL (ref 7–30)
CALCIUM SERPL-MCNC: 8.8 MG/DL (ref 8.5–10.1)
CHLORIDE SERPL-SCNC: 106 MMOL/L (ref 94–109)
CO2 SERPL-SCNC: 31 MMOL/L (ref 20–32)
CREAT SERPL-MCNC: 0.65 MG/DL (ref 0.52–1.04)
GFR SERPL CREATININE-BSD FRML MDRD: 87 ML/MIN/1.7M2
GLUCOSE SERPL-MCNC: 88 MG/DL (ref 70–99)
POTASSIUM SERPL-SCNC: 3.5 MMOL/L (ref 3.4–5.3)
PROT SERPL-MCNC: 6.2 G/DL (ref 6.8–8.8)
SODIUM SERPL-SCNC: 141 MMOL/L (ref 133–144)
T4 FREE SERPL-MCNC: 1.1 NG/DL (ref 0.76–1.46)
TSH SERPL DL<=0.005 MIU/L-ACNC: 0.18 MU/L (ref 0.4–4)

## 2018-07-09 PROCEDURE — 00000402 ZZHCL STATISTIC TOTAL PROTEIN: Performed by: INTERNAL MEDICINE

## 2018-07-09 PROCEDURE — 84439 ASSAY OF FREE THYROXINE: CPT | Performed by: INTERNAL MEDICINE

## 2018-07-09 PROCEDURE — 83883 ASSAY NEPHELOMETRY NOT SPEC: CPT | Performed by: INTERNAL MEDICINE

## 2018-07-09 PROCEDURE — 36415 COLL VENOUS BLD VENIPUNCTURE: CPT

## 2018-07-09 PROCEDURE — 84443 ASSAY THYROID STIM HORMONE: CPT | Performed by: INTERNAL MEDICINE

## 2018-07-09 PROCEDURE — 82784 ASSAY IGA/IGD/IGG/IGM EACH: CPT | Performed by: INTERNAL MEDICINE

## 2018-07-09 PROCEDURE — 80053 COMPREHEN METABOLIC PANEL: CPT | Performed by: INTERNAL MEDICINE

## 2018-07-09 PROCEDURE — G0463 HOSPITAL OUTPT CLINIC VISIT: HCPCS

## 2018-07-09 PROCEDURE — 99214 OFFICE O/P EST MOD 30 MIN: CPT | Performed by: INTERNAL MEDICINE

## 2018-07-09 PROCEDURE — 84165 PROTEIN E-PHORESIS SERUM: CPT | Performed by: INTERNAL MEDICINE

## 2018-07-09 RX ORDER — PROCHLORPERAZINE MALEATE 10 MG
10 TABLET ORAL EVERY 6 HOURS PRN
Qty: 30 TABLET | Refills: 3 | Status: SHIPPED | OUTPATIENT
Start: 2018-07-09 | End: 2019-06-03

## 2018-07-09 ASSESSMENT — PAIN SCALES - GENERAL: PAINLEVEL: NO PAIN (0)

## 2018-07-09 NOTE — PROGRESS NOTES
Visit Date:   07/09/2018     HEMATOLOGY HISTORY: Mrs. Dunaway is a lady with multiple myeloma (IgG kappa). High risk, t(14;16).  1. On 05/01/2017, WBC of 3.4, hemoglobin of 10.2 and platelet of 154.  2. SPEP on 07/07/2017 revealed M-spike of 1.8.  3. Bone marrow biopsy on 07/12/2017 reveals kappa monotypic plasma cell population consistent with multiple myeloma.  Bone marrow is 70% cellular.  Plasma cell is 50-60%.     -There is gain of chromosome 1, 5, 9, 15, and 17.  There is translocation 14;16.   4.  On 07/18/2017:  -M-spike of 1.9.   -Immunofixation reveals monoclonal IgG kappa.    -IgG level of 2970.  IgA of 15 and IgM of 175.    -Kappa free light chain of 67.7.  Lambda free light chain of 1.42.  Ratio of kappa to lambda of 47.71.    -Beta 2 microglobulin of 3.4.   5. PET scan on 07/24/2017 reveals several focal areas of increased FDG uptake consistent with multiple myeloma.  There is probable epithelial cyst in tail of the pancreas.  No associated abnormal FDG activity.  Left thyroid cysts or nodules without any FDG activity.  There is a 0.3 cm left upper lobe lung nodule.   8.  Velcade, Revlimid and dexamethasone between on 08/14/2017 and 04/30/2018. Velcade stopped.   -Revlimid and dexamethasone continued.     SUBJECTIVE:   Ms. Dunaway is an 82-year-old female with high-risk multiple myeloma.  She was on Velcade, Revlimid and dexamethasone.  Since May'18 she is on Revlimid and dexamethasone.      She has been more tired.  In the third week of Revlimid she gets more fatigued.  That is her main problem.      No headache.  No dizziness.  No chest pain.  No difficulty breathing.  No abdominal pain, nausea or vomiting.  Appetite has been fairly good.  No fever or chills.  No new bowel complaints.      PHYSICAL EXAMINATION:    Alert and oriented × 3.  Eyes: No icterus.    Throat: No ulcer.    Neck: No lymphadenopathy.  Nontender.  Axilla: No lymphadenopathy.  Lungs: Good air entry bilaterally.  Heart:  Regular.  Abdomen: Soft.  Nondistended.  Nontender.  No masses felt.  Extremities: Mild edema.  Skin: No petechiae.      LABORATORY DATA:  Reviewed.      ASSESSMENT:   1.  An 82-year-old female with IgG kappa multiple myeloma.   2.  Fatigue.   3.  Peripheral neuropathy which is stable.      PLAN:   1.  I discussed with her regarding multiple myeloma.  Clinically, she is stable.  CMP and CBC from today are essentially normal.  SPEP, IgG, and free light chain are pending.  We will inform of the result.      For myeloma, she is on Revlimid.  She gets more fatigued in the third week of Revlimid.  I told the patient that we can change the regimen.      After discussion, plan is for the patient to get Revlimid for 14 days, followed by 7 days off.  Hopefully, that will help with fatigue.  Also I am going to decrease the dexamethasone to 20 mg once a week.  We will see how she does with her fatigue. Side effects of Revlimid discussed.      2.  For workup of fatigue, we will get a TSH checked.      3.  Patient has been on Zometa.  As she has fatigue, I am not going to give her Zometa today.  We will give it next time.      4.  I will see her in 1 month's time with labs. Patient advised to return to clinic sooner if she has fever, chills, worsening weakness, shortness of breath, bleeding or any other concerns.      Total face-to-face time spent 25 minutes, more than 50% of the time spent in counseling and coordination of care.         YVES PEREZ MD             D: 2018   T: 2018   MT: RO      Name:     DUGLAS CASTILLO   MRN:      [id number]        Account:      [id number]   :      1935           Visit Date:   2018      Document: [id number]

## 2018-07-09 NOTE — PROGRESS NOTES
Oral Chemotherapy Monitoring Program    Primary Oncologist: Dr. Parry  Primary Oncology Clinic: [facility]  Cancer Diagnosis: Multiple Myeloma    Therapy History:  Previously on Revlimid-Dex-Velcade (Rev 2 weeks on 1 week off); Switching on 5/7/18 to Revlimid 3 weeks on 1 week off + dex  7/9/18: Change to 14 days on 7 days off       Drug Interaction Assessment: none    Lab Monitoring Plan  Place oral chemo monograph lab box here  Subjective/Objective:  Quiana Dunaway is a 82 year old female seen in clinic for a follow-up visit for oral chemotherapy.  Met with Quiana in clinic today. She is doing well, although she has been more fatigued lately. She states she notices this more around week three. She uses her cane a little bit more now to help with walking and getting worn out with that. Otherwise, she is upbeat and denies other side effects at this time.    ORAL CHEMOTHERAPY 8/14/2017 9/18/2017 10/16/2017 5/7/2018 6/4/2018 7/9/2018   Drug Name Revlimid (Lenalidomide) Revlimid (Lenalidomide) Revlimid (Lenalidomide) Revlimid (Lenalidomide) Revlimid (Lenalidomide) Revlimid (Lenalidomide)   Current Dosage 10mg 10mg 10mg 10mg 10mg 10mg   Current Schedule Daily Daily Daily Daily Daily Daily   Cycle Details 2 weeks on 1 week off 2 weeks on 1 week off 2 weeks on 1 week off 3 weeks on 1 week off 3 weeks on 1 week off 2 weeks on 1 week off   Start Date of Last Cycle 8/14/2017 9/5/2017 10/2/2017 5/7/2018 6/4/2018 7/1/2018   Planned next cycle start date - 10/2/2017 10/23/2017 6/4/2018 7/2/2018 7/22/2018   Doses missed in last 2 weeks - more 0 0 0 0   Adherence Assessment - Adherent Adherent - Adherent Adherent   Adverse Effects - Fatigue Fatigue - No AE identified during assessment Fatigue   Fatigue - - - - - (No Data)   Pharmacist Intervention(fatigue) - - - - - Yes   Intervention(s) - - - - - Dosing interval changed (chemo)   Home BPs - - - - Not applicable Not applicable   Any new drug interactions? - No No No No No   Is  "the dose as ordered appropriate for the patient? - No No Yes Yes Yes   Is the patient currently in pain? - - - - Assessed in last 30 days. Assessed in last 30 days.   Has the patient been assessed within the past 6 months for depression? - - - - Yes Yes   Has the patient missed any days of school, work, or other routine activity? - - - - No No       Last PHQ-2 Score on record:   PHQ-2 ( 1999 [employer]) 6/18/2018 10/17/2017   Q1: Little interest or pleasure in doing things 0 0   Q2: Feeling down, depressed or hopeless 0 0   PHQ-2 Score 0 0       Patient does not report depression symptoms.      Vitals:  BP:   BP Readings from Last 1 Encounters:   07/09/18 118/61     Wt Readings from Last 1 Encounters:   07/09/18 68 kg (150 lb)     Estimated body surface area is 1.72 meters squared as calculated from the following:    Height as of an earlier encounter on 7/9/18: 1.575 m (5' 2\").    Weight as of an earlier encounter on 7/9/18: 68 kg (150 lb).    Labs:  _  Result Component Current Result Ref Range   Sodium 142 (7/9/2018) 133 - 144 mmol/L     _  Result Component Current Result Ref Range   Potassium 3.9 (7/9/2018) 3.4 - 5.3 mmol/L     _  Result Component Current Result Ref Range   Calcium 8.8 (7/9/2018) 8.5 - 10.1 mg/dL     No results found for Mag within last 30 days.     No results found for Phos within last 30 days.     _  Result Component Current Result Ref Range   Albumin 3.5 (7/9/2018) 3.4 - 5.0 g/dL     _  Result Component Current Result Ref Range   Urea Nitrogen 17 (7/9/2018) 7 - 30 mg/dL     _  Result Component Current Result Ref Range   Creatinine 0.66 (7/9/2018) 0.52 - 1.04 mg/dL       _  Result Component Current Result Ref Range   AST 15 (7/9/2018) 0 - 45 U/L     _  Result Component Current Result Ref Range   ALT 22 (7/9/2018) 0 - 50 U/L     _  Result Component Current Result Ref Range   Bilirubin Total 0.6 (7/9/2018) 0.2 - 1.3 mg/dL       _  Result Component Current Result Ref Range   WBC 4.9 (7/9/2018) 4.0 - " 11.0 10e9/L     _  Result Component Current Result Ref Range   Hemoglobin 12.9 (7/9/2018) 11.7 - 15.7 g/dL     _  Result Component Current Result Ref Range   Platelet Count 207 (7/9/2018) 150 - 450 10e9/L     _  Result Component Current Result Ref Range   Absolute Neutrophil 3.0 (7/9/2018) 1.6 - 8.3 10e9/L           Labs within normal limits to proceed.    Assessment:  Patient having more fatigue lately where it is wearing her out on the 21 days on 7 days off schedule.    Plan:  Continue Revlimid, change to 14 days on 7 days off. Patient aware of plan. She will start her off week on 7/15 and then restart new cycle on 7/22.    Follow-Up:  3-4 weeks when she is back. See if she has less fatigue.     Refill Due:  7/22    Debra Matos PharmD  July 9, 2018

## 2018-07-09 NOTE — PATIENT INSTRUCTIONS
Labs today.  Change revlimid to 2 weeks on and 1 week off.   Decrease dexamethasone to 20 mg a week.  Follow up in 4-5 weeks.scheduled/edy    AVS given to patient/edy

## 2018-07-09 NOTE — PROGRESS NOTES
Infusion Nursing Note:  Quiana Dunaway presents today for labs.    Patient seen by provider today: Yes: Dr. Parry   present during visit today: Not Applicable.    Note: PAT Paryr/Jing Bethea, RN 7/9/18 15:15  Please hold Zometa today. Patient has been feeling weak and will take a break from Zometa this month. Please have patient resume Zometa next month in August.     Intravenous Access:  Lab draw site LAC, Needle type Butterfly, Gauge 23.    Treatment Conditions:  Lab Results   Component Value Date    HGB 12.9 07/09/2018     Lab Results   Component Value Date    WBC 4.9 07/09/2018      Lab Results   Component Value Date    ANEU 3.0 07/09/2018     Lab Results   Component Value Date     07/09/2018      Lab Results   Component Value Date     06/04/2018                   Lab Results   Component Value Date    POTASSIUM 3.4 06/04/2018           No results found for: MAG         Lab Results   Component Value Date    CR 0.59 06/04/2018                   Lab Results   Component Value Date    HUMBERTO 8.5 06/04/2018                Lab Results   Component Value Date    BILITOTAL 0.4 06/04/2018           Lab Results   Component Value Date    ALBUMIN 3.4 06/04/2018                    Lab Results   Component Value Date    ALT 21 06/04/2018           Lab Results   Component Value Date    AST 16 06/04/2018           Post Infusion Assessment:  Patient tolerated infusion without incident.  Site patent and intact, free from redness, edema or discomfort.  No evidence of extravasations.  Access discontinued per protocol.    Discharge Plan:   Patient declined prescription refills.  Discharge instructions reviewed with: Patient and family.  Patient and family verbalized understanding of discharge instructions and all questions answered.  AVS to patient via M_SOLUTION.  Patient will return 8/13/18 for next appointment.   Patient discharged in stable condition accompanied by: self and daughter-in-law.  Departure  Mode: Ambulatory.    Isabel Bethea RN

## 2018-07-09 NOTE — Clinical Note
7/9/2018         RE: Quiana Dunaway  [address] [facility] [address]        Dear Colleague,    Thank you for referring your patient, Quiana Dunaway, to the [facility]. Please see a copy of my visit note below.    Visit Date:   07/09/2018     HEMATOLOGY HISTORY: Mrs. Dunaway is a lady with multiple myeloma (IgG kappa). High risk, t(14;16).  1. On 05/01/2017, WBC of 3.4, hemoglobin of 10.2 and platelet of 154.  2. SPEP on 07/07/2017 revealed M-spike of 1.8.  3. Bone marrow biopsy on 07/12/2017 reveals kappa monotypic plasma cell population consistent with multiple myeloma.  Bone marrow is 70% cellular.  Plasma cell is 50-60%.     -There is gain of chromosome 1, 5, 9, 15, and 17.  There is translocation 14;16.   4.  On 07/18/2017:  -M-spike of 1.9.   -Immunofixation reveals monoclonal IgG kappa.    -IgG level of 2970.  IgA of 15 and IgM of 175.    -Kappa free light chain of 67.7.  Lambda free light chain of 1.42.  Ratio of kappa to lambda of 47.71.    -Beta 2 microglobulin of 3.4.   5. PET scan on 07/24/2017 reveals several focal areas of increased FDG uptake consistent with multiple myeloma.  There is probable epithelial cyst in tail of the pancreas.  No associated abnormal FDG activity.  Left thyroid cysts or nodules without any FDG activity.  There is a 0.3 cm left upper lobe lung nodule.   8.  Velcade, Revlimid and dexamethasone between on 08/14/2017 and 04/30/2018. Velcade stopped.   -Revlimid and dexamethasone continued.     SUBJECTIVE:   Ms. Dunaway is an 82-year-old female with high-risk multiple myeloma.  She was on Velcade, Revlimid and dexamethasone.  Since May'18 she is on Revlimid and dexamethasone.      She has been more tired.  In the third week of Revlimid she gets more fatigued.  That is her main problem.      No headache.  No dizziness.  No chest pain.  No difficulty breathing.  No abdominal pain, nausea or vomiting.  Appetite has been fairly good.  No fever or chills.   No new bowel complaints.      PHYSICAL EXAMINATION:    Alert and oriented × 3.  Eyes: No icterus.    Throat: No ulcer.    Neck: No lymphadenopathy.  Nontender.  Axilla: No lymphadenopathy.  Lungs: Good air entry bilaterally.  Heart: Regular.  Abdomen: Soft.  Nondistended.  Nontender.  No masses felt.  Extremities: Mild edema.  Skin: No petechiae.      LABORATORY DATA:  Reviewed.      ASSESSMENT:   1.  An 82-year-old female with IgG kappa multiple myeloma.   2.  Fatigue.   3.  Peripheral neuropathy which is stable.      PLAN:   1.  I discussed with her regarding multiple myeloma.  Clinically, she is stable.  CMP and CBC from today are essentially normal.  SPEP, IgG, and free light chain are pending.  We will inform of the result.      For myeloma, she is on Revlimid.  She gets more fatigued in the third week of Revlimid.  I told the patient that we can change the regimen.      After discussion, plan is for the patient to get Revlimid for 14 days, followed by 7 days off.  Hopefully, that will help with fatigue.  Also I am going to decrease the dexamethasone to 20 mg once a week.  We will see how she does with her fatigue. Side effects of Revlimid discussed.      2.  For workup of fatigue, we will get a TSH checked.      3.  Patient has been on Zometa.  As she has fatigue, I am not going to give her Zometa today.  We will give it next time.      4.  I will see her in 1 month's time with labs. Patient advised to return to clinic sooner if she has fever, chills, worsening weakness, shortness of breath, bleeding or any other concerns.      Total face-to-face time spent 25 minutes, more than 50% of the time spent in counseling and coordination of care.         YVES PEREZ MD             D: 2018   T: 2018   MT: RO      Name:     DUGLAS CASTILLO   MRN:      [id number]        Account:      [id number]   :      1935           Visit Date:   2018      Document: [id number]        Again, thank you  for allowing me to participate in the care of your patient.        Sincerely,        Alex Parry MD

## 2018-07-09 NOTE — MR AVS SNAPSHOT
After Visit Summary   7/9/2018    Quiana Dunaway    MRN: [id number]           Patient Information     Date Of Birth          1935        Visit Information        Provider Department      7/9/2018 3:30 PM  INFUSION CHAIR 8 [facility]        Today's Diagnoses     Multiple myeloma not having achieved remission (H)    -  1       Follow-ups after your visit        Your next 10 appointments already scheduled     Aug 13, 2018  2:00 PM CDT   Return Visit with Alex Parry MD   [facility] ([facility])    [facility]  [address] [facility] [place]  [facility] [address] [phone]            Oct 08, 2018 12:30 PM CDT   Office Visit with César Chung MD   [facility] ([facility])    [address] [facility] [id number] [phone]           Bring a current list of meds and any records pertaining to this visit. For Physicals, please bring immunization records and any forms needing to be filled out. Please arrive 10 minutes early to complete paperwork.              Who to contact     If you have questions or need follow up information about today's clinic visit or your schedule please contact [facility] directly at [phone].  Normal or non-critical lab and imaging results will be communicated to you by Divvyshothart, letter or phone within 4 business days after the clinic has received the results. If you do not hear from us within 7 days, please contact the clinic through Accelat or phone. If you have a critical or abnormal lab result, we will notify you by phone as soon as possible.  Submit refill requests through [employer] or call your pharmacy and they will forward the refill request to us. Please allow 3 business days for your refill to be completed.          Additional Information About Your Visit        JobSpice Information     [employer] gives you secure  access to your electronic health record. If you see a primary care provider, you can also send messages to your care team and make appointments. If you have questions, please call your primary care clinic.  If you do not have a primary care provider, please call [phone] and they will assist you.        Care EveryWhere ID     This is your Care EveryWhere ID. This could be used by other organizations to access your [facility] medical records  [id number]         Blood Pressure from Last 3 Encounters:   07/09/18 118/61   06/18/18 122/66   06/04/18 149/75    Weight from Last 3 Encounters:   07/09/18 68 kg (150 lb)   06/18/18 69.4 kg (153 lb)   06/04/18 69.4 kg (153 lb)              Today, you had the following     No orders found for display         Today's Medication Changes          These changes are accurate as of 7/9/18  3:44 PM.  If you have any questions, ask your nurse or doctor.               Stop taking these medicines if you haven't already. Please contact your care team if you have questions.     dexamethasone 4 MG tablet   Commonly known as:  DECADRON   Stopped by:  Alex Parry MD                Where to get your medicines      These medications were sent to [facility] [place] - [place], [place] - [address]  [address], [place] [address]     Phone:  [phone]     prochlorperazine 10 MG tablet                Primary Care Provider Office Phone # Fax #    César Chung [phone] [address]  [facility] [address]        Equal Access to Services     [facility]: Hadii aad ku hadasho Soomaali, waaxda luqadaha, qaybta kaalmada miriam, [place] rodriguez . So [facility] [phone].    ATENCIÓN: Si habla español, tiene a contreras disposición servicios gratuitos de asistencia lingüística. Llame al [phone].    We comply with applicable federal civil rights laws and [place] laws. We do not discriminate on the basis of race,  color, national origin, age, disability, sex, sexual orientation, or gender identity.            Thank you!     Thank you for choosing [facility]  for your care. Our goal is always to provide you with excellent care. Hearing back from our patients is one way we can continue to improve our services. Please take a few minutes to complete the written survey that you may receive in the mail after your visit with us. Thank you!             Your Updated Medication List - Protect others around you: Learn how to safely use, store and throw away your medicines at www.disposemymeds.org.          This list is accurate as of 7/9/18  3:44 PM.  Always use your most recent med list.                   Brand Name Dispense Instructions for use Diagnosis    acyclovir 400 MG tablet    ZOVIRAX    60 tablet    Take 1 tablet (400 mg) by mouth 2 times daily Viral Prophylaxis.    Multiple myeloma not having achieved remission (H)       aspirin 81 MG chewable tablet      Take 81 mg by mouth daily        escitalopram 5 MG tablet    LEXAPRO    90 tablet    TAKE 1 TABLET BY MOUTH DAILY    ROBER (generalized anxiety disorder)       IMODIUM A-D PO      Take by mouth as needed        LENalidomide 10 MG Caps capsule CHEMO    REVLIMID    21 capsule    Take 1 capsule (10 mg) by mouth daily for 21 days Take on Days 1 through 21 of 28-day cycle.    Multiple myeloma not having achieved remission (H)       lisinopril 10 MG tablet    PRINIVIL/ZESTRIL    90 tablet    Take 1 tablet (10 mg) by mouth daily    Benign essential hypertension       LORazepam 0.5 MG tablet    ATIVAN    10 tablet    Take 1 tablet (0.5 mg) by mouth every 8 hours as needed (Anxiety, Nausea/Vomiting or Sleep)    Multiple myeloma not having achieved remission (H)       MYRBETRIQ PO      Take 25 mg by mouth daily        nitroGLYcerin 0.4 MG sublingual tablet    NITROSTAT    25 tablet    For chest pain place 1 tablet under the tongue every 5 minutes for 3  doses. If symptoms persist 5 minutes after 1st dose call 911.    Atypical chest pain       * order for DME     1 Units    Dispense one 4 wheeled walker with hand brakes and seat    Multiple myeloma not having achieved remission (H)       * order for DME     1 Units    Wheelchair.    Need for assistance due to unsteady gait       prochlorperazine 10 MG tablet    COMPAZINE    30 tablet    Take 1 tablet (10 mg) by mouth every 6 hours as needed (Nausea/Vomiting)    Multiple myeloma not having achieved remission (H), Fatigue, unspecified type       ZOMETA IV           * Notice:  This list has 2 medication(s) that are the same as other medications prescribed for you. Read the directions carefully, and ask your doctor or other care provider to review them with you.

## 2018-07-09 NOTE — MR AVS SNAPSHOT
After Visit Summary   7/9/2018    Quiana Dunaway    MRN: [id number]           Patient Information     Date Of Birth          1935        Visit Information        Provider Department      7/9/2018 2:00 PM Nurse,  Oncology [facility]        Today's Diagnoses     Multiple myeloma not having achieved remission (H)    -  1    Hypokalemia        Pancytopenia (H)           Follow-ups after your visit        Your next 10 appointments already scheduled     Jul 09, 2018  3:00 PM CDT   Return Visit with Alex Parry MD   [facility])    [facility]  [address]  [place] [address]   [phone]            Jul 09, 2018  3:30 PM CDT   Level 1 with  INFUSION CHAIR 8   [facility] ([facility])    [facility]  [address]  [place] [address]   [phone]            Oct 08, 2018 12:30 PM CDT   Office Visit with César Chung MD   [facility]    [address] [facility] [address] [phone]           Bring a current list of meds and any records pertaining to this visit. For Physicals, please bring immunization records and any forms needing to be filled out. Please arrive 10 minutes early to complete paperwork.              Who to contact     If you have questions or need follow up information about today's clinic visit or your schedule please contact [facility] directly at [phone].  Normal or non-critical lab and imaging results will be communicated to you by MyChart, letter or phone within 4 business days after the clinic has received the results. If you do not hear from us within 7 days, please contact the clinic through MyChart or phone. If you have a critical or abnormal lab result, we will notify you by phone as soon as possible.  Submit refill requests through  [facility] or call your pharmacy and they will forward the refill request to us. Please allow 3 business days for your refill to be completed.          Additional Information About Your Visit        MyChart Information     [facility] gives you secure access to your electronic health record. If you see a primary care provider, you can also send messages to your care team and make appointments. If you have questions, please call your primary care clinic.  If you do not have a primary care provider, please call [phone] and they will assist you.        Care EveryWhere ID     This is your Care EveryWhere ID. This could be used by other organizations to access your [facility] medical records  [id number]         Blood Pressure from Last 3 Encounters:   06/18/18 122/66   06/04/18 149/75   06/04/18 120/72    Weight from Last 3 Encounters:   06/18/18 69.4 kg (153 lb)   06/04/18 69.4 kg (153 lb)   06/04/18 70.3 kg (155 lb)              Today, you had the following     No orders found for display       Primary Care Provider Office Phone # Fax #    César Chung [phone] [address] [facility] [address]  [place] [address]        Equal Access to Services     KEVON SANDOVAL : Hadii althea emersono Sonellie, waaxda luqadaha, qaybta kaalmada adedenzelyada, pho[place] villeda. So [facility] [phone].    ATENCIÓN: Si habla español, tiene a contreras disposición servicios gratuitos de asistencia lingüística. Llame al [phone].    We comply with applicable federal civil rights laws and [place] laws. We do not discriminate on the basis of race, color, national origin, age, disability, sex, sexual orientation, or gender identity.            Thank you!     Thank you for choosing [facility]  for your care. Our goal is always to provide you with excellent care. Hearing back from our patients is one way we can continue to improve our services. Please take a few minutes to complete the written survey  that you may receive in the mail after your visit with us. Thank you!             Your Updated Medication List - Protect others around you: Learn how to safely use, store and throw away your medicines at www.disposemymeds.org.          This list is accurate as of 7/9/18  2:22 PM.  Always use your most recent med list.                   Brand Name Dispense Instructions for use Diagnosis    acyclovir 400 MG tablet    ZOVIRAX    60 tablet    Take 1 tablet (400 mg) by mouth 2 times daily Viral Prophylaxis.    Multiple myeloma not having achieved remission (H)       aspirin 81 MG chewable tablet      Take 81 mg by mouth daily        * dexamethasone 4 MG tablet    DECADRON    40 tablet    Take 10 tablets (40 mg) by mouth once a week Once a week with food on Days 1,8,15 and 22.    Multiple myeloma not having achieved remission (H)       * dexamethasone 4 MG tablet    DECADRON    40 tablet    Take 10 tablets (40 mg) by mouth once a week Once a week with food on Days 1,8,15 and 22.    Multiple myeloma not having achieved remission (H)       escitalopram 5 MG tablet    LEXAPRO    90 tablet    TAKE 1 TABLET BY MOUTH DAILY    ROBER (generalized anxiety disorder)       IMODIUM A-D PO      Take by mouth as needed        LENalidomide 10 MG Caps capsule CHEMO    REVLIMID    21 capsule    Take 1 capsule (10 mg) by mouth daily for 21 days Take on Days 1 through 21 of 28-day cycle.    Multiple myeloma not having achieved remission (H)       lisinopril 10 MG tablet    PRINIVIL/ZESTRIL    90 tablet    Take 1 tablet (10 mg) by mouth daily    Benign essential hypertension       LORazepam 0.5 MG tablet    ATIVAN    10 tablet    Take 1 tablet (0.5 mg) by mouth every 8 hours as needed (Anxiety, Nausea/Vomiting or Sleep)    Multiple myeloma not having achieved remission (H)       MYRBETRIQ PO      Take 25 mg by mouth daily        nitroGLYcerin 0.4 MG sublingual tablet    NITROSTAT    25 tablet    For chest pain place 1 tablet under the tongue  every 5 minutes for 3 doses. If symptoms persist 5 minutes after 1st dose call 911.    Atypical chest pain       * order for DME     1 Units    Dispense one 4 wheeled walker with hand brakes and seat    Multiple myeloma not having achieved remission (H)       * order for DME     1 Units    Wheelchair.    Need for assistance due to unsteady gait       prochlorperazine 10 MG tablet    COMPAZINE    30 tablet    Take 1 tablet (10 mg) by mouth every 6 hours as needed (Nausea/Vomiting)    Multiple myeloma not having achieved remission (H)       ZOMETA IV           * Notice:  This list has 4 medication(s) that are the same as other medications prescribed for you. Read the directions carefully, and ask your doctor or other care provider to review them with you.

## 2018-07-09 NOTE — PROGRESS NOTES
Medical Assistant Note:  Quiana Dunaway presents today for lab draw.    Patient seen by provider today: Yes:/ Parry.   present during visit today: Not Applicable.    Concerns: No Concerns.    Procedure:  Lab draw site: RAC, Needle type: BF, Gauge: 23g with gauze and conban.    Post Assessment:  Labs drawn without difficulty: Yes.    Discharge Plan:  Departure Mode: Ambulatory.    Face to Face Time: 4mins.    Nannette Castle CMA

## 2018-07-09 NOTE — MR AVS SNAPSHOT
After Visit Summary   7/9/2018    Quiana Dunaway    MRN: [id number]           Patient Information     Date Of Birth          1935        Visit Information        Provider Department      7/9/2018 3:00 PM Alex Parry MD [facility]        Today's Diagnoses     Multiple myeloma not having achieved remission (H)    -  1    Fatigue, unspecified type          Care Instructions    Labs today.  Change revlimid to 2 weeks on and 1 week off.   Decrease dexamethasone to 20 mg a week.  Follow up in 4-5 weeks.          Follow-ups after your visit        Your next 10 appointments already scheduled     Aug 13, 2018  2:00 PM CDT   Return Visit with Alex Parry MD   [facility] ([facility]    [facility]  [address] [id number] [phone]            Oct 08, 2018 12:30 PM CDT   Office Visit with César Chung MD   [facility]    [address] [facility] [id number] [phone]           Bring a current list of meds and any records pertaining to this visit. For Physicals, please bring immunization records and any forms needing to be filled out. Please arrive 10 minutes early to complete paperwork.              Who to contact     If you have questions or need follow up information about today's clinic visit or your schedule please contact [facility] [place] [facility] directly at [phone].  Normal or non-critical lab and imaging results will be communicated to you by MyChart, letter or phone within 4 business days after the clinic has received the results. If you do not hear from us within 7 days, please contact the clinic through MyChart or phone. If you have a critical or abnormal lab result, we will notify you by phone as soon as possible.  Submit refill requests through [employer] or call your pharmacy and they will forward the refill request to us. Please allow 3 business  "days for your refill to be completed.          Additional Information About Your Visit        MyChart Information     Quiet[employer] gives you secure access to your electronic health record. If you see a primary care provider, you can also send messages to your care team and make appointments. If you have questions, please call your primary care clinic.  If you do not have a primary care provider, please call [phone] and they will assist you.        Care EveryWhere ID     This is your Care EveryWhere ID. This could be used by other organizations to access your [place] medical records  [id number]        Your Vitals Were     Pulse Temperature Height Pulse Oximetry BMI (Body Mass Index)       76 97.9  F (36.6  C) (Oral) 1.575 m (5' 2\") 94% 27.44 kg/m2        Blood Pressure from Last 3 Encounters:   07/09/18 118/61   06/18/18 122/66   06/04/18 149/75    Weight from Last 3 Encounters:   07/09/18 68 kg (150 lb)   06/18/18 69.4 kg (153 lb)   06/04/18 69.4 kg (153 lb)              We Performed the Following     Comprehensive metabolic panel     IgG     Kappa and lambda light chain     Protein electrophoresis     TSH with free T4 reflex          Today's Medication Changes          These changes are accurate as of 7/9/18  3:34 PM.  If you have any questions, ask your nurse or doctor.               Stop taking these medicines if you haven't already. Please contact your care team if you have questions.     dexamethasone 4 MG tablet   Commonly known as:  DECADRON   Stopped by:  Alex Parry MD                Where to get your medicines      These medications were sent to [facility] - [address]  [address], [place] [address]     Phone:  [phone]     prochlorperazine 10 MG tablet                Primary Care Provider Office Phone # Fax #    César Chung [phone] [address]  [facility] [address]        Equal Access to Services     [facility] " GAAR : Hadii aad ku samira Contreras, waaxda luqadaha, qaybta kasergioda miriam, [place] karenin hayaafrancisco gonzalez damarisabdirahman rodriguez . So [facility] [phone].    ATENCIÓN: Si habla español, tiene a contreras disposición servicios gratuitos de asistencia lingüística. Llame al [phone].    We comply with applicable federal civil rights laws and [place] laws. We do not discriminate on the basis of race, color, national origin, age, disability, sex, sexual orientation, or gender identity.            Thank you!     Thank you for choosing [facility]  for your care. Our goal is always to provide you with excellent care. Hearing back from our patients is one way we can continue to improve our services. Please take a few minutes to complete the written survey that you may receive in the mail after your visit with us. Thank you!             Your Updated Medication List - Protect others around you: Learn how to safely use, store and throw away your medicines at www.disposemymeds.org.          This list is accurate as of 7/9/18  3:34 PM.  Always use your most recent med list.                   Brand Name Dispense Instructions for use Diagnosis    acyclovir 400 MG tablet    ZOVIRAX    60 tablet    Take 1 tablet (400 mg) by mouth 2 times daily Viral Prophylaxis.    Multiple myeloma not having achieved remission (H)       aspirin 81 MG chewable tablet      Take 81 mg by mouth daily        escitalopram 5 MG tablet    LEXAPRO    90 tablet    TAKE 1 TABLET BY MOUTH DAILY    ROBER (generalized anxiety disorder)       IMODIUM A-D PO      Take by mouth as needed        LENalidomide 10 MG Caps capsule CHEMO    REVLIMID    21 capsule    Take 1 capsule (10 mg) by mouth daily for 21 days Take on Days 1 through 21 of 28-day cycle.    Multiple myeloma not having achieved remission (H)       lisinopril 10 MG tablet    PRINIVIL/ZESTRIL    90 tablet    Take 1 tablet (10 mg) by mouth daily    Benign essential hypertension       LORazepam 0.5 MG  tablet    ATIVAN    10 tablet    Take 1 tablet (0.5 mg) by mouth every 8 hours as needed (Anxiety, Nausea/Vomiting or Sleep)    Multiple myeloma not having achieved remission (H)       MYRBETRIQ PO      Take 25 mg by mouth daily        nitroGLYcerin 0.4 MG sublingual tablet    NITROSTAT    25 tablet    For chest pain place 1 tablet under the tongue every 5 minutes for 3 doses. If symptoms persist 5 minutes after 1st dose call 911.    Atypical chest pain       * order for DME     1 Units    Dispense one 4 wheeled walker with hand brakes and seat    Multiple myeloma not having achieved remission (H)       * order for DME     1 Units    Wheelchair.    Need for assistance due to unsteady gait       prochlorperazine 10 MG tablet    COMPAZINE    30 tablet    Take 1 tablet (10 mg) by mouth every 6 hours as needed (Nausea/Vomiting)    Multiple myeloma not having achieved remission (H), Fatigue, unspecified type       ZOMETA IV           * Notice:  This list has 2 medication(s) that are the same as other medications prescribed for you. Read the directions carefully, and ask your doctor or other care provider to review them with you.

## 2018-07-09 NOTE — LETTER
7/9/2018         RE: Quiana Dunaway  [address] [facility] [address]        Dear Colleague,    Thank you for referring your patient, Quiana Dunaway, to the [facility]. Please see a copy of my visit note below.    Medical Assistant Note:  Quiana Dunaway presents today for lab draw.    Patient seen by provider today: Yes:/ oShan.   present during visit today: Not Applicable.    Concerns: No Concerns.    Procedure:  Lab draw site: RAC, Needle type: BF, Gauge: 23g with gauze and conban.    Post Assessment:  Labs drawn without difficulty: Yes.    Discharge Plan:  Departure Mode: Ambulatory.    Face to Face Time: 4mins.    Nannette Castle CMA                  Again, thank you for allowing me to participate in the care of your patient.        Sincerely,        Oncology Nurse

## 2018-07-10 LAB
ALBUMIN SERPL ELPH-MCNC: 4 G/DL (ref 3.7–5.1)
ALPHA1 GLOB SERPL ELPH-MCNC: 0.3 G/DL (ref 0.2–0.4)
ALPHA2 GLOB SERPL ELPH-MCNC: 0.8 G/DL (ref 0.5–0.9)
B-GLOBULIN SERPL ELPH-MCNC: 0.7 G/DL (ref 0.6–1)
GAMMA GLOB SERPL ELPH-MCNC: 0.6 G/DL (ref 0.7–1.6)
IGG SERPL-MCNC: 520 MG/DL (ref 695–1620)
KAPPA LC UR-MCNC: 2.6 MG/DL (ref 0.33–1.94)
KAPPA LC/LAMBDA SER: 3.82 {RATIO} (ref 0.26–1.65)
LAMBDA LC SERPL-MCNC: 0.68 MG/DL (ref 0.57–2.63)
M PROTEIN SERPL ELPH-MCNC: 0.3 G/DL
PROT PATTERN SERPL ELPH-IMP: ABNORMAL

## 2018-07-23 DIAGNOSIS — C90.00 MULTIPLE MYELOMA NOT HAVING ACHIEVED REMISSION (H): Primary | ICD-10-CM

## 2018-07-23 RX ORDER — LENALIDOMIDE 10 MG/1
10 CAPSULE ORAL DAILY
Qty: 14 CAPSULE | Refills: 0 | Status: SHIPPED | OUTPATIENT
Start: 2018-07-23 | End: 2018-12-26

## 2018-07-23 RX ORDER — DEXAMETHASONE 4 MG/1
20 TABLET ORAL WEEKLY
Qty: 20 TABLET | Refills: 0 | Status: SHIPPED | OUTPATIENT
Start: 2018-07-23 | End: 2018-10-08

## 2018-08-06 ENCOUNTER — HOSPITAL ENCOUNTER (EMERGENCY)
Facility: CLINIC | Age: 83
Discharge: HOME | End: 2018-08-06
Attending: EMERGENCY MEDICINE | Admitting: EMERGENCY MEDICINE
Payer: MEDICARE

## 2018-08-06 ENCOUNTER — APPOINTMENT (OUTPATIENT)
Dept: GENERAL RADIOLOGY | Facility: CLINIC | Age: 83
End: 2018-08-06
Attending: EMERGENCY MEDICINE
Payer: MEDICARE

## 2018-08-06 VITALS
TEMPERATURE: 98.2 F | HEART RATE: 70 BPM | WEIGHT: 150 LBS | HEIGHT: 62 IN | DIASTOLIC BLOOD PRESSURE: 75 MMHG | BODY MASS INDEX: 27.6 KG/M2 | SYSTOLIC BLOOD PRESSURE: 133 MMHG | OXYGEN SATURATION: 95 % | RESPIRATION RATE: 18 BRPM

## 2018-08-06 DIAGNOSIS — Z95.0 PACEMAKER: ICD-10-CM

## 2018-08-06 DIAGNOSIS — I48.91 ATRIAL FIBRILLATION, UNSPECIFIED TYPE (H): ICD-10-CM

## 2018-08-06 DIAGNOSIS — R07.89 ATYPICAL CHEST PAIN: ICD-10-CM

## 2018-08-06 LAB
ANION GAP SERPL CALCULATED.3IONS-SCNC: 5 MMOL/L (ref 3–14)
BASOPHILS # BLD AUTO: 0 10E9/L (ref 0–0.2)
BASOPHILS NFR BLD AUTO: 0.4 %
BUN SERPL-MCNC: 21 MG/DL (ref 7–30)
CALCIUM SERPL-MCNC: 9 MG/DL (ref 8.5–10.1)
CHLORIDE SERPL-SCNC: 108 MMOL/L (ref 94–109)
CO2 SERPL-SCNC: 28 MMOL/L (ref 20–32)
CREAT SERPL-MCNC: 0.63 MG/DL (ref 0.52–1.04)
DIFFERENTIAL METHOD BLD: NORMAL
EOSINOPHIL # BLD AUTO: 0.1 10E9/L (ref 0–0.7)
EOSINOPHIL NFR BLD AUTO: 2.9 %
ERYTHROCYTE [DISTWIDTH] IN BLOOD BY AUTOMATED COUNT: 14.6 % (ref 10–15)
GFR SERPL CREATININE-BSD FRML MDRD: >90 ML/MIN/1.7M2
GLUCOSE SERPL-MCNC: 89 MG/DL (ref 70–99)
HCT VFR BLD AUTO: 37.1 % (ref 35–47)
HGB BLD-MCNC: 12.7 G/DL (ref 11.7–15.7)
IMM GRANULOCYTES # BLD: 0 10E9/L (ref 0–0.4)
IMM GRANULOCYTES NFR BLD: 0.6 %
INTERPRETATION ECG - MUSE: NORMAL
LYMPHOCYTES # BLD AUTO: 1 10E9/L (ref 0.8–5.3)
LYMPHOCYTES NFR BLD AUTO: 21.3 %
MCH RBC QN AUTO: 30.6 PG (ref 26.5–33)
MCHC RBC AUTO-ENTMCNC: 34.2 G/DL (ref 31.5–36.5)
MCV RBC AUTO: 89 FL (ref 78–100)
MONOCYTES # BLD AUTO: 0.7 10E9/L (ref 0–1.3)
MONOCYTES NFR BLD AUTO: 14.6 %
NEUTROPHILS # BLD AUTO: 2.9 10E9/L (ref 1.6–8.3)
NEUTROPHILS NFR BLD AUTO: 60.2 %
NRBC # BLD AUTO: 0 10*3/UL
NRBC BLD AUTO-RTO: 0 /100
PLATELET # BLD AUTO: 212 10E9/L (ref 150–450)
POTASSIUM SERPL-SCNC: 4 MMOL/L (ref 3.4–5.3)
RBC # BLD AUTO: 4.15 10E12/L (ref 3.8–5.2)
SODIUM SERPL-SCNC: 141 MMOL/L (ref 133–144)
TROPONIN I SERPL-MCNC: <0.015 UG/L (ref 0–0.04)
TROPONIN I SERPL-MCNC: <0.015 UG/L (ref 0–0.04)
WBC # BLD AUTO: 4.8 10E9/L (ref 4–11)

## 2018-08-06 PROCEDURE — 84484 ASSAY OF TROPONIN QUANT: CPT | Performed by: EMERGENCY MEDICINE

## 2018-08-06 PROCEDURE — 99285 EMERGENCY DEPT VISIT HI MDM: CPT | Mod: 25

## 2018-08-06 PROCEDURE — 71046 X-RAY EXAM CHEST 2 VIEWS: CPT

## 2018-08-06 PROCEDURE — 80048 BASIC METABOLIC PNL TOTAL CA: CPT | Performed by: EMERGENCY MEDICINE

## 2018-08-06 PROCEDURE — A9270 NON-COVERED ITEM OR SERVICE: HCPCS | Mod: GY | Performed by: EMERGENCY MEDICINE

## 2018-08-06 PROCEDURE — 25000132 ZZH RX MED GY IP 250 OP 250 PS 637: Mod: GY | Performed by: EMERGENCY MEDICINE

## 2018-08-06 PROCEDURE — 93005 ELECTROCARDIOGRAM TRACING: CPT

## 2018-08-06 PROCEDURE — 85025 COMPLETE CBC W/AUTO DIFF WBC: CPT | Performed by: EMERGENCY MEDICINE

## 2018-08-06 PROCEDURE — 36416 COLLJ CAPILLARY BLOOD SPEC: CPT | Performed by: EMERGENCY MEDICINE

## 2018-08-06 RX ORDER — PANTOPRAZOLE SODIUM 40 MG/1
40 TABLET, DELAYED RELEASE ORAL ONCE
Status: COMPLETED | OUTPATIENT
Start: 2018-08-06 | End: 2018-08-06

## 2018-08-06 RX ORDER — PANTOPRAZOLE SODIUM 40 MG/1
40 TABLET, DELAYED RELEASE ORAL DAILY
Qty: 30 TABLET | Refills: 0 | Status: SHIPPED | OUTPATIENT
Start: 2018-08-06 | End: 2018-09-05

## 2018-08-06 RX ADMIN — PANTOPRAZOLE SODIUM 40 MG: 40 TABLET, DELAYED RELEASE ORAL at 12:23

## 2018-08-06 ASSESSMENT — ENCOUNTER SYMPTOMS
NAUSEA: 0
LIGHT-HEADEDNESS: 0
DIARRHEA: 1
DIAPHORESIS: 0
WEAKNESS: 1

## 2018-08-06 NOTE — ED PROVIDER NOTES
History     Chief Complaint:  Chest pain    HPI   Quiana Dunaway is a 83 year old female with a history of hypertension, atrial fibrillation, and hyperlipidemia who presents to the emergency department with her daughter for evaluation of chest pain. Of note, the patient has multiple myeloma and is taking Revlimid and Dexamethasone pills for chemotherapy. The patient reports that she had the onset of a dull chest pain three days ago while shopping with her grandchildren that made her feel weak a caused her to need a wheelchair. She then went home and took some Tums which improved the pain. Then, at 1100 last night while sleeping she woke up to the return of the dull chest pain that did not radiate and lasted a few hours and was resolved with Tums. She also had diarrhea at this time. Now she reports she is feeling increased weakness as the morning goes on. She reports she has a pacemaker due to an irregular heartbeat. The patient denies diaphoresis, nausea, lightheadedness, or a serious history of acid reflux.     Cardiac Risk Factors   Sex: Female   Tobacco: Negative   Hypertension: Positive  Diabetes: Negative  Hyperlipidemia: Positive  Family History: Unknown    Allergies:  No Known Drug Allergies     Medications:    Protonix  Zovirax  Aspirin  Decadron  Lexapro  Revlimid  Lisinopril  Loperamide HCl  Ativan  Myrbetriq  Nitrostat  Compazine  Zoledronic acid     Past Medical History:    Hypertension  multiple myeloma  Pacemaker  Pancytopenia  Benzodiazepine overdose  Atrial fibrillation  Hypokalemia    Past Surgical History:    Bone marrow biopsy  Implant pacemaker    Family History:    Unknown/Adopted    Social History:  Negative for tobacco use.  Negative for alcohol use.  Marital Status:        Review of Systems   Constitutional: Negative for diaphoresis.   Cardiovascular: Positive for chest pain.   Gastrointestinal: Positive for diarrhea. Negative for nausea.   Neurological: Positive for weakness.  "Negative for light-headedness.   All other systems reviewed and are negative.    Physical Exam   First Vitals:  BP: 154/74  Heart Rate: 86  Temp: 98.2  F (36.8  C)  Resp: 16  Height: 157.5 cm (5' 2\")  Weight: 68 kg (150 lb)  SpO2: 97 %      Physical Exam    Constitutional: Elderly white female, supine  HENT: No signs of trauma.   Eyes: EOM are normal. Pupils are equal, round, and reactive to light.   Neck: Normal range of motion. No JVD present. No cervical adenopathy.  Cardiovascular: Regular rhythm.  Exam reveals no gallop and no friction rub.    No murmur heard. 1+ radial and femoral pulse bilateral. Pacemaker left chest  Pulmonary/Chest: Bilateral breath sounds normal. No wheezes, rhonchi or rales.  Abdominal: Soft. No tenderness. No rebound or guarding.   Musculoskeletal: No edema. No tenderness.   Lymphadenopathy: No lymphadenopathy.   Neurological: Alert and oriented to person, place, and time. Normal strength. Coordination normal.   Skin: Skin is warm and dry. No rash noted. No erythema.     Emergency Department Course   ECG:  @ 0825  Indication: Chest pain  Vent. Rate 73 bpm. ID interval 188 ms. QRS duration 106 ms. QT/QTc 386/425 ms. P-R-T axis 29 -7 45.   Atrial-paced rhythm.  No significant change when compared to previous ECG from 11/28/17   Read @ 0835 by Dr. Mccartney.    Imaging:  Chest X-Ray. 2 Views:   IMPRESSION: No acute cardiopulmonary disease. Stable left chest  pacemaker.  Reading per radiology.     Radiographic findings were communicated with the patient who voiced understanding of the findings.    Laboratory:  CBC: WBC: 4.8, HGB: 12.7, PLT: 212  BMP:  o/w WNL (Creatinine: 0.63)    0855 Troponin I: <0.015  1046 Troponin I: <0.015    Interventions:  1223 Protonix EC tablet 40 mg     Emergency Department Course:  0900 Nursing notes and vitals reviewed. I performed an exam of the patient as documented above.     Medicine administered as documented above. Blood drawn. This was sent to the lab for " further testing, results above.    The patient was sent for a chest XR and had a stress test done while in the emergency department, findings above.     1144 I rechecked the patient and discussed the results of her workup thus far.     Findings and plan explained to the Patient. Patient discharged home with instructions regarding supportive care, medications, and reasons to return. The importance of close follow-up was reviewed. The patient was prescribed Protonix.    I personally reviewed the laboratory results with the Patient and answered all related questions prior to discharge.   Impression & Plan    Medical Decision Making:  This is an 83 year old woman who has multiple myeloma and a previous pacemaker. She is here for chest discomfort. She noticed it for the first time three days ago when she was with her grandchildren. It was in the upper chest area and did not radiate or have any associated symptoms. She took Tums and it went away. She had another episode last night while in bed with similar type symptoms. She has no known coronary disease but does have the pacemaker for what appears to be a history of atrial fibrillation. She is not on a blood thinner and she does not have a strong history of reflux disease to her knowledge. Her exam here is unremarkable, she is pain free, EKG shows a pace rhythm and troponin x2 has been negative. Her pain at this point is atypical, I think it is unlikely to be PE, dissection, or acute coronary disease, however, I will obtain a nuclear stress test to further evaluate. Her last one was a year and a half ago and was normal. Additionally, we will start her on Protonix. She should make a follow up with her primary after the stress test and if symptoms reoccur, worsen, or persist she should recheck in the ED.    Diagnosis:    ICD-10-CM    1. Atypical chest pain R07.89 NM Lexiscan stress test   2.      Multiple myeloma.     Disposition:  discharged to home    Discharge  Medications:  New Prescriptions    PANTOPRAZOLE (PROTONIX) 40 MG EC TABLET    Take 1 tablet (40 mg) by mouth daily for 30 doses     Scribe Disclosure:  I, Dieter Nova, am serving as a scribe on 8/6/2018 at 9:00 AM to personally document services performed by Dr. Sherrill MD based on my observations and the provider's statements to me.     Dieter Nova  8/6/2018    EMERGENCY DEPARTMENT       Dinesh Mccartney MD  08/06/18 1524

## 2018-08-06 NOTE — ED AVS SNAPSHOT
Emergency Department    [address] [facility] [address]    Phone:  [phone]    Fax:  [phone]                                       Quiana Dunaway   MRN: [id number]    Department:   Emergency Department   Date of Visit:  8/6/2018           Patient Information     Date Of Birth          1935        Your diagnoses for this visit were:     Atypical chest pain     Pacemaker     Atrial fibrillation, unspecified type (H)        You were seen by Dinesh Mccartney MD.      Follow-up Information     Schedule an appointment as soon as possible for a visit with César Chung MD.    Specialty:  Internal Medicine    Why:  recheck ed, If symptoms worsen    Contact information:    [address] [facility] [address]  [facility] [address] [phone]        Discharge References/Attachments     CHEST PAIN, UNCERTAIN CAUSE (ENGLISH)      Your next 10 appointments already scheduled     Aug 13, 2018  2:00 PM CDT   Return Visit with Alex Parry MD   [facility] ([facility])    [facility]  [address] [facility] [address]  [facility] [id number] [phone]            Oct 08, 2018 12:30 PM CDT   Office Visit with César Chung MD   [facility]    [address] [facility] [address] [phone]           Bring a current list of meds and any records pertaining to this visit. For Physicals, please bring immunization records and any forms needing to be filled out. Please arrive 10 minutes early to complete paperwork.              24 Hour Appointment Hotline       To make an appointment at any [facility], call [phone] ([phone]). If you don't have a family doctor or clinic, we will help you find one. [facility] are conveniently located to serve the needs of you and your family.          ED Discharge Orders     NM Lexiscan stress test                    Review of your medicines      START taking         Dose / Directions Last dose taken    pantoprazole 40 MG EC tablet   Commonly known as:  PROTONIX   Dose:  40 mg   Quantity:  30 tablet        Take 1 tablet (40 mg) by mouth daily for 30 doses   Refills:  0          Our records show that you are taking the medicines listed below. If these are incorrect, please call your family doctor or clinic.        Dose / Directions Last dose taken    acyclovir 400 MG tablet   Commonly known as:  ZOVIRAX   Dose:  400 mg   Quantity:  60 tablet        Take 1 tablet (400 mg) by mouth 2 times daily Viral Prophylaxis.   Refills:  5        aspirin 81 MG chewable tablet   Dose:  81 mg        Take 81 mg by mouth daily   Refills:  0        dexamethasone 4 MG tablet   Commonly known as:  DECADRON   Dose:  20 mg   Quantity:  20 tablet        Take 5 tablets (20 mg) by mouth once a week Once a week with food on Days 1,8,15.   Refills:  0        escitalopram 5 MG tablet   Commonly known as:  LEXAPRO   Quantity:  90 tablet        TAKE 1 TABLET BY MOUTH DAILY   Refills:  0        IMODIUM A-D PO        Take by mouth as needed   Refills:  0        LENalidomide 10 MG Caps capsule CHEMO   Commonly known as:  REVLIMID   Dose:  10 mg   Quantity:  14 capsule        Take 1 capsule (10 mg) by mouth daily for 14 days Take on Days 1 through 14 of 21-day cycle.   Refills:  0        lisinopril 10 MG tablet   Commonly known as:  PRINIVIL/ZESTRIL   Dose:  10 mg   Quantity:  90 tablet        Take 1 tablet (10 mg) by mouth daily   Refills:  3        LORazepam 0.5 MG tablet   Commonly known as:  ATIVAN   Dose:  0.5 mg   Quantity:  10 tablet        Take 1 tablet (0.5 mg) by mouth every 8 hours as needed (Anxiety, Nausea/Vomiting or Sleep)   Refills:  3        MYRBETRIQ PO   Dose:  25 mg   Indication:  Overactive Bladder, Frequent Urination        Take 25 mg by mouth daily   Refills:  0        nitroGLYcerin 0.4 MG sublingual tablet   Commonly known as:  NITROSTAT   Quantity:  25 tablet        For chest pain  place 1 tablet under the tongue every 5 minutes for 3 doses. If symptoms persist 5 minutes after 1st dose call 911.   Refills:  0        * order for DME   Quantity:  1 Units        Dispense one 4 wheeled walker with hand brakes and seat   Refills:  0        * order for DME   Quantity:  1 Units        Wheelchair.   Refills:  0        prochlorperazine 10 MG tablet   Commonly known as:  COMPAZINE   Dose:  10 mg   Quantity:  30 tablet        Take 1 tablet (10 mg) by mouth every 6 hours as needed (Nausea/Vomiting)   Refills:  3        ZOMETA IV        Refills:  0        * Notice:  This list has 2 medication(s) that are the same as other medications prescribed for you. Read the directions carefully, and ask your doctor or other care provider to review them with you.            Prescriptions were sent or printed at these locations (1 Prescription)                   Other Prescriptions                Printed at Department/Unit printer (1 of 1)         pantoprazole (PROTONIX) 40 MG EC tablet                Procedures and tests performed during your visit     Basic metabolic panel    CBC with platelets differential    Chest XR,  PA & LAT    EKG 12 lead    Troponin I    Troponin I (now)      Orders Needing Specimen Collection     None      Pending Results     Date and Time Order Name Status Description    8/6/2018 0912 Chest XR,  PA & LAT Preliminary             Pending Culture Results     No orders found from 8/4/2018 to 8/7/2018.            Pending Results Instructions     If you had any lab results that were not finalized at the time of your Discharge, you can call the ED Lab Result RN at [phone]. You will be contacted by this team for any positive Lab results or changes in treatment. The nurses are available 7 days a week from 10A to 6:30P.  You can leave a message 24 hours per day and they will return your call.        Test Results From Your Hospital Stay        8/6/2018  9:18 AM      Component Results     Component  Value Ref Range & Units Status    WBC 4.8 4.0 - 11.0 10e9/L Final    RBC Count 4.15 3.8 - 5.2 10e12/L Final    Hemoglobin 12.7 11.7 - 15.7 g/dL Final    Hematocrit 37.1 35.0 - 47.0 % Final    MCV 89 78 - 100 fl Final    MCH 30.6 26.5 - 33.0 pg Final    MCHC 34.2 31.5 - 36.5 g/dL Final    RDW 14.6 10.0 - 15.0 % Final    Platelet Count 212 150 - 450 10e9/L Final    Diff Method Automated Method  Final    % Neutrophils 60.2 % Final    % Lymphocytes 21.3 % Final    % Monocytes 14.6 % Final    % Eosinophils 2.9 % Final    % Basophils 0.4 % Final    % Immature Granulocytes 0.6 % Final    Nucleated RBCs 0 0 /100 Final    Absolute Neutrophil 2.9 1.6 - 8.3 10e9/L Final    Absolute Lymphocytes 1.0 0.8 - 5.3 10e9/L Final    Absolute Monocytes 0.7 0.0 - 1.3 10e9/L Final    Absolute Eosinophils 0.1 0.0 - 0.7 10e9/L Final    Absolute Basophils 0.0 0.0 - 0.2 10e9/L Final    Abs Immature Granulocytes 0.0 0 - 0.4 10e9/L Final    Absolute Nucleated RBC 0.0  Final         8/6/2018  9:52 AM      Component Results     Component Value Ref Range & Units Status    Troponin I ES <0.015 0.000 - 0.045 ug/L Final    The 99th percentile for upper reference range is 0.045 ug/L.  Troponin values   in the range of 0.045 - 0.120 ug/L may be associated with risks of adverse   clinical events.           8/6/2018  9:52 AM      Component Results     Component Value Ref Range & Units Status    Sodium 141 133 - 144 mmol/L Final    Potassium 4.0 3.4 - 5.3 mmol/L Final    Specimen slightly hemolyzed, potassium may be falsely elevated    Chloride 108 94 - 109 mmol/L Final    Carbon Dioxide 28 20 - 32 mmol/L Final    Anion Gap 5 3 - 14 mmol/L Final    Glucose 89 70 - 99 mg/dL Final    Urea Nitrogen 21 7 - 30 mg/dL Final    Creatinine 0.63 0.52 - 1.04 mg/dL Final    GFR Estimate >90 >60 mL/min/1.7m2 Final    Non  GFR Calc    GFR Estimate If Black >90 >60 mL/min/1.7m2 Final    African American GFR Calc    Calcium 9.0 8.5 - 10.1 mg/dL Final          8/6/2018  9:31 AM      Narrative     CHEST TWO VIEWS August 6, 2018 9:26 AM     HISTORY: Chest pain.    COMPARISON: 12/7/2017.        Impression     IMPRESSION: No acute cardiopulmonary disease. Stable left chest  pacemaker.         8/6/2018 11:22 AM      Component Results     Component Value Ref Range & Units Status    Troponin I ES <0.015 0.000 - 0.045 ug/L Final    The 99th percentile for upper reference range is 0.045 ug/L.  Troponin values   in the range of 0.045 - 0.120 ug/L may be associated with risks of adverse   clinical events.                  Clinical Quality Measure: Blood Pressure Screening     Your blood pressure was checked while you were in the emergency department today. The last reading we obtained was  BP: 129/60 . Please read the guidelines below about what these numbers mean and what you should do about them.  If your systolic blood pressure (the top number) is less than 120 and your diastolic blood pressure (the bottom number) is less than 80, then your blood pressure is normal. There is nothing more that you need to do about it.  If your systolic blood pressure (the top number) is 120-139 or your diastolic blood pressure (the bottom number) is 80-89, your blood pressure may be higher than it should be. You should have your blood pressure rechecked within a year by a primary care provider.  If your systolic blood pressure (the top number) is 140 or greater or your diastolic blood pressure (the bottom number) is 90 or greater, you may have high blood pressure. High blood pressure is treatable, but if left untreated over time it can put you at risk for heart attack, stroke, or kidney failure. You should have your blood pressure rechecked by a primary care provider within the next 4 weeks.  If your provider in the emergency department today gave you specific instructions to follow-up with your doctor or provider even sooner than that, you should follow that instruction and not wait for up to 4  weeks for your follow-up visit.        Thank you for choosing [facility]       Thank you for choosing [facility] for your care. Our goal is always to provide you with excellent care. Hearing back from our patients is one way we can continue to improve our services. Please take a few minutes to complete the written survey that you may receive in the mail after you visit with us. Thank you!        [employer] Information     [employer] gives you secure access to your electronic health record. If you see a primary care provider, you can also send messages to your care team and make appointments. If you have questions, please call your primary care clinic.  If you do not have a primary care provider, please call [phone] and they will assist you.        Care EveryWhere ID     This is your Care EveryWhere ID. This could be used by other organizations to access your [facility] medical records  [id number]        Equal Access to Services     LILLIANA SANDOVAL : Stacie Contreras, chance escobar, phoenix benjamin. So [facility] [place] [phone].    ATENCIÓN: Si habla español, tiene a contreras disposición servicios gratuitos de asistencia lingüística. Lllonny al [phone].    We comply with applicable federal civil rights laws and [place] laws. We do not discriminate on the basis of race, color, national origin, age, disability, sex, sexual orientation, or gender identity.            After Visit Summary       This is your record. Keep this with you and show to your community pharmacist(s) and doctor(s) at your next visit.

## 2018-08-06 NOTE — ED AVS SNAPSHOT
Emergency Department    [address] [place] [address]    Phone:  [phone]    Fax:  [phone]                                       Quiana Dunaway   MRN: [id number]    Department:   Emergency Department   Date of Visit:  8/6/2018           After Visit Summary Signature Page     I have received my discharge instructions, and my questions have been answered. I have discussed any challenges I see with this plan with the nurse or doctor.    ..........................................................................................................................................  Patient/Patient Representative Signature      ..........................................................................................................................................  Patient Representative Print Name and Relationship to Patient    ..................................................               ................................................  Date                                            Time    ..........................................................................................................................................  Reviewed by Signature/Title    ...................................................              ..............................................  Date                                                            Time

## 2018-08-08 ENCOUNTER — TELEPHONE (OUTPATIENT)
Dept: CARDIOLOGY | Facility: CLINIC | Age: 83
End: 2018-08-08

## 2018-08-08 DIAGNOSIS — R07.9 CHEST PAIN: Primary | ICD-10-CM

## 2018-08-08 NOTE — TELEPHONE ENCOUNTER
Pt daughter called and stated that pt was in the ER on Monday (8/6) and has been recommended the pt have a nuclear stress test.  Daughter wanting to know if this is needed per Dr Worthington.  Pt Daughter states that the pt is under stress with family issues and not sure that this is the reason for the pain.  Explained that the because it is not known if this is what causing the chest pain the stress test was ordered.  Pt daughter would like Dr Worthington opinion on the need for the Nuc.  Explained that he will not be back in the office until Friday, so will discuss with him at that that time and contact daughter with his recommendations.  Freya

## 2018-08-10 DIAGNOSIS — C90.00 MULTIPLE MYELOMA NOT HAVING ACHIEVED REMISSION (H): Primary | ICD-10-CM

## 2018-08-13 ENCOUNTER — ONCOLOGY VISIT (OUTPATIENT)
Dept: ONCOLOGY | Facility: CLINIC | Age: 83
End: 2018-08-13
Attending: INTERNAL MEDICINE
Payer: MEDICARE

## 2018-08-13 ENCOUNTER — DOCUMENTATION ONLY (OUTPATIENT)
Dept: PHARMACY | Facility: CLINIC | Age: 83
End: 2018-08-13

## 2018-08-13 ENCOUNTER — HOSPITAL ENCOUNTER (OUTPATIENT)
Facility: CLINIC | Age: 83
Setting detail: SPECIMEN
Discharge: HOME | End: 2018-08-13
Attending: INTERNAL MEDICINE | Admitting: INTERNAL MEDICINE
Payer: MEDICARE

## 2018-08-13 VITALS
BODY MASS INDEX: 28.5 KG/M2 | DIASTOLIC BLOOD PRESSURE: 61 MMHG | OXYGEN SATURATION: 97 % | WEIGHT: 155.8 LBS | HEART RATE: 85 BPM | SYSTOLIC BLOOD PRESSURE: 129 MMHG | TEMPERATURE: 98.4 F | RESPIRATION RATE: 16 BRPM

## 2018-08-13 DIAGNOSIS — C90.00 MULTIPLE MYELOMA NOT HAVING ACHIEVED REMISSION (H): Primary | ICD-10-CM

## 2018-08-13 LAB
ALBUMIN SERPL-MCNC: 3.3 G/DL (ref 3.4–5)
ALP SERPL-CCNC: 78 U/L (ref 40–150)
ALT SERPL W P-5'-P-CCNC: 21 U/L (ref 0–50)
ANION GAP SERPL CALCULATED.3IONS-SCNC: 8 MMOL/L (ref 3–14)
AST SERPL W P-5'-P-CCNC: 13 U/L (ref 0–45)
BASOPHILS # BLD AUTO: 0 10E9/L (ref 0–0.2)
BASOPHILS NFR BLD AUTO: 0.4 %
BILIRUB SERPL-MCNC: 0.6 MG/DL (ref 0.2–1.3)
BUN SERPL-MCNC: 15 MG/DL (ref 7–30)
CALCIUM SERPL-MCNC: 8.3 MG/DL (ref 8.5–10.1)
CHLORIDE SERPL-SCNC: 107 MMOL/L (ref 94–109)
CO2 SERPL-SCNC: 27 MMOL/L (ref 20–32)
CREAT SERPL-MCNC: 0.64 MG/DL (ref 0.52–1.04)
DIFFERENTIAL METHOD BLD: ABNORMAL
EOSINOPHIL # BLD AUTO: 0.2 10E9/L (ref 0–0.7)
EOSINOPHIL NFR BLD AUTO: 3.5 %
ERYTHROCYTE [DISTWIDTH] IN BLOOD BY AUTOMATED COUNT: 14.3 % (ref 10–15)
GFR SERPL CREATININE-BSD FRML MDRD: 89 ML/MIN/1.7M2
GLUCOSE SERPL-MCNC: 92 MG/DL (ref 70–99)
HCT VFR BLD AUTO: 36.1 % (ref 35–47)
HGB BLD-MCNC: 12.2 G/DL (ref 11.7–15.7)
IMM GRANULOCYTES # BLD: 0 10E9/L (ref 0–0.4)
IMM GRANULOCYTES NFR BLD: 0.2 %
LYMPHOCYTES # BLD AUTO: 1.3 10E9/L (ref 0.8–5.3)
LYMPHOCYTES NFR BLD AUTO: 28.8 %
MCH RBC QN AUTO: 30.3 PG (ref 26.5–33)
MCHC RBC AUTO-ENTMCNC: 33.8 G/DL (ref 31.5–36.5)
MCV RBC AUTO: 90 FL (ref 78–100)
MONOCYTES # BLD AUTO: 0.7 10E9/L (ref 0–1.3)
MONOCYTES NFR BLD AUTO: 15.7 %
NEUTROPHILS # BLD AUTO: 2.3 10E9/L (ref 1.6–8.3)
NEUTROPHILS NFR BLD AUTO: 51.4 %
NRBC # BLD AUTO: 0 10*3/UL
NRBC BLD AUTO-RTO: 0 /100
PLATELET # BLD AUTO: 135 10E9/L (ref 150–450)
POTASSIUM SERPL-SCNC: 3.4 MMOL/L (ref 3.4–5.3)
PROT SERPL-MCNC: 6.2 G/DL (ref 6.8–8.8)
RBC # BLD AUTO: 4.03 10E12/L (ref 3.8–5.2)
SODIUM SERPL-SCNC: 142 MMOL/L (ref 133–144)
WBC # BLD AUTO: 4.5 10E9/L (ref 4–11)

## 2018-08-13 PROCEDURE — 83883 ASSAY NEPHELOMETRY NOT SPEC: CPT | Performed by: INTERNAL MEDICINE

## 2018-08-13 PROCEDURE — 85025 COMPLETE CBC W/AUTO DIFF WBC: CPT | Performed by: INTERNAL MEDICINE

## 2018-08-13 PROCEDURE — 00000402 ZZHCL STATISTIC TOTAL PROTEIN: Performed by: INTERNAL MEDICINE

## 2018-08-13 PROCEDURE — 99214 OFFICE O/P EST MOD 30 MIN: CPT | Performed by: INTERNAL MEDICINE

## 2018-08-13 PROCEDURE — 84165 PROTEIN E-PHORESIS SERUM: CPT | Performed by: INTERNAL MEDICINE

## 2018-08-13 PROCEDURE — 80053 COMPREHEN METABOLIC PANEL: CPT | Performed by: INTERNAL MEDICINE

## 2018-08-13 PROCEDURE — 82784 ASSAY IGA/IGD/IGG/IGM EACH: CPT | Performed by: INTERNAL MEDICINE

## 2018-08-13 RX ORDER — LORAZEPAM 0.5 MG/1
0.5 TABLET ORAL EVERY 8 HOURS PRN
Qty: 30 TABLET | Refills: 3 | Status: SHIPPED | OUTPATIENT
Start: 2018-08-13 | End: 2019-04-10

## 2018-08-13 ASSESSMENT — PAIN SCALES - GENERAL: PAINLEVEL: NO PAIN (0)

## 2018-08-13 NOTE — MR AVS SNAPSHOT
After Visit Summary   8/13/2018    Quiana Dunaway    MRN: [id number]           Patient Information     Date Of Birth          1935        Visit Information        Provider Department      8/13/2018 2:00 PM Alex Parry MD [facility]        Today's Diagnoses     Multiple myeloma not having achieved remission (H)    -  1      Care Instructions    Hold revlimid and Zometa in September.  Please schedule Zometa infusion ASAP  Continue weekly dexamethasone.  Follow up in October with Zometa infusion          Follow-ups after your visit        Your next 10 appointments already scheduled     Aug 21, 2018  2:00 PM CDT   Level 1 with  INFUSION CHAIR 16   [facility] ([facility])    [facility]  [address]  [facility] [address] [phone]            Sep 10, 2018  3:15 PM CDT   Pacemaker Check with GABRIEL HUNTERN   [facility]    [address] [facility] [address]  [facility] [address] [phone] OPT 2            Oct 08, 2018 12:30 PM CDT   [facility] César Chung MD   [facility]    [address] [facility] [address] [address] [phone]           Bring a current list of meds and any records pertaining to this visit. For Physicals, please bring immunization records and any forms needing to be filled out. Please arrive 10 minutes early to complete paperwork.            Oct 15, 2018  2:00 PM CDT   Level 1 with  INFUSION CHAIR 3   [facility] ([facility]    [facility] [place]  [address]  [place] [address]   [phone]            Oct 15, 2018  2:40 PM CDT   Return Visit with Alex Parry MD   [facility] ([facility])    [facility]  [address]  [place]  [address]   [phone]              Future tests that were ordered for you today     Open Standing Orders        Priority Remaining Interval Expires Ordered    IgG Routine 1/1 AM DRAW  8/13/2018    Protein electrophoresis Routine 1/1 AM DRAW  8/13/2018    Kappa and lambda light chain Routine 1/1 AM DRAW  8/13/2018          Open Future Orders        Priority Expected Expires Ordered    CBC with platelets differential Routine  8/12/2019 8/13/2018    Comprehensive metabolic panel Routine  8/12/2019 8/13/2018            Who to contact     If you have questions or need follow up information about today's clinic visit or your schedule please contact [facility] directly at [phone].  Normal or non-critical lab and imaging results will be communicated to you by [employer], letter or phone within 4 business days after the clinic has received the results. If you do not hear from us within 7 days, please contact the clinic through [employer] or phone. If you have a critical or abnormal lab result, we will notify you by phone as soon as possible.  Submit refill requests through [employer] or call your pharmacy and they will forward the refill request to us. Please allow 3 business days for your refill to be completed.          Additional Information About Your Visit        [employer]-Share Information     [employer] gives you secure access to your electronic health record. If you see a primary care provider, you can also send messages to your care team and make appointments. If you have questions, please call your primary care clinic.  If you do not have a primary care provider, please call [phone] and they will assist you.        Care EveryWhere ID     This is your Care EveryWhere ID. This could be used by other organizations to access your [place] [facility] medical records  [id number]        Your Vitals Were     Pulse Temperature Respirations Pulse Oximetry BMI (Body Mass Index)       85 98.4  F (36.9  C) (Oral) 16 97% 28.5  kg/m2        Blood Pressure from Last 3 Encounters:   08/13/18 129/61   08/06/18 133/75   07/09/18 118/61    Weight from Last 3 Encounters:   08/13/18 70.7 kg (155 lb 12.8 oz)   08/06/18 68 kg (150 lb)   07/09/18 68 kg (150 lb)              We Performed the Following     **Comprehensive metabolic panel FUTURE anytime     Albumin level     Calcium     CBC with platelets and differential     Creatinine          Where to get your medicines      Some of these will need a paper prescription and others can be bought over the counter.  Ask your nurse if you have questions.     Bring a paper prescription for each of these medications     LORazepam 0.5 MG tablet          Primary Care Provider Office Phone # Fax #    César Chung [phone] [address] [address]        Equal Access to Services     [facility] CenterTHERESA : Hadii althea emersono Sonellie, waaxda luqadaha, qaybta kaalmabertha pineda, phoenix rodriguez . So [facility] [phone].    ATENCIÓN: Si habla español, tiene a contreras disposición servicios gratuitos de asistencia lingüística. [facility] [phone].    We comply with applicable federal civil rights laws and [place] laws. We do not discriminate on the basis of race, color, national origin, age, disability, sex, sexual orientation, or gender identity.            Thank you!     Thank you for choosing [facility]  for your care. Our goal is always to provide you with excellent care. Hearing back from our patients is one way we can continue to improve our services. Please take a few minutes to complete the written survey that you may receive in the mail after your visit with us. Thank you!             Your Updated Medication List - Protect others around you: Learn how to safely use, store and throw away your medicines at www.disposemymeds.org.          This list is accurate as of 8/13/18  2:59 PM.  Always use your most recent med list.                    Brand Name Dispense Instructions for use Diagnosis    acyclovir 400 MG tablet    ZOVIRAX    60 tablet    Take 1 tablet (400 mg) by mouth 2 times daily Viral Prophylaxis.    Multiple myeloma not having achieved remission (H)       aspirin 81 MG chewable tablet      Take 81 mg by mouth daily        dexamethasone 4 MG tablet    DECADRON    20 tablet    Take 5 tablets (20 mg) by mouth once a week Once a week with food on Days 1,8,15.    Multiple myeloma not having achieved remission (H)       escitalopram 5 MG tablet    LEXAPRO    90 tablet    TAKE 1 TABLET BY MOUTH DAILY    ROBER (generalized anxiety disorder)       IMODIUM A-D PO      Take by mouth as needed        LENalidomide 10 MG Caps capsule CHEMO    REVLIMID    14 capsule    Take 1 capsule (10 mg) by mouth daily for 14 days Take on Days 1 through 14 of 21-day cycle.    Multiple myeloma not having achieved remission (H)       lisinopril 10 MG tablet    PRINIVIL/ZESTRIL    90 tablet    Take 1 tablet (10 mg) by mouth daily    Benign essential hypertension       LORazepam 0.5 MG tablet    ATIVAN    30 tablet    Take 1 tablet (0.5 mg) by mouth every 8 hours as needed (Anxiety, Nausea/Vomiting or Sleep)    Multiple myeloma not having achieved remission (H)       MYRBETRIQ PO      Take 25 mg by mouth daily        nitroGLYcerin 0.4 MG sublingual tablet    NITROSTAT    25 tablet    For chest pain place 1 tablet under the tongue every 5 minutes for 3 doses. If symptoms persist 5 minutes after 1st dose call 911.    Atypical chest pain       * order for DME     1 Units    Dispense one 4 wheeled walker with hand brakes and seat    Multiple myeloma not having achieved remission (H)       * order for DME     1 Units    Wheelchair.    Need for assistance due to unsteady gait       pantoprazole 40 MG EC tablet    PROTONIX    30 tablet    Take 1 tablet (40 mg) by mouth daily for 30 doses        prochlorperazine 10 MG tablet    COMPAZINE    30 tablet    Take 1 tablet (10 mg)  by mouth every 6 hours as needed (Nausea/Vomiting)    Multiple myeloma not having achieved remission (H), Fatigue, unspecified type       ZOMETA IV           * Notice:  This list has 2 medication(s) that are the same as other medications prescribed for you. Read the directions carefully, and ask your doctor or other care provider to review them with you.

## 2018-08-13 NOTE — PROGRESS NOTES
Visit Date:   08/13/2018     HEMATOLOGY HISTORY: Mrs. Dunaway is a lady with multiple myeloma (IgG kappa). High risk, t(14;16).  1. On 05/01/2017, WBC of 3.4, hemoglobin of 10.2 and platelet of 154.  2. SPEP on 07/07/2017 revealed M-spike of 1.8.  3. Bone marrow biopsy on 07/12/2017 reveals kappa monotypic plasma cell population consistent with multiple myeloma.  Bone marrow is 70% cellular.  Plasma cell is 50-60%.     -There is gain of chromosome 1, 5, 9, 15, and 17.  There is translocation 14;16.   4.  On 07/18/2017:  -M-spike of 1.9.   -Immunofixation reveals monoclonal IgG kappa.    -IgG level of 2970.  IgA of 15 and IgM of 175.    -Kappa free light chain of 67.7.  Lambda free light chain of 1.42.  Ratio of kappa to lambda of 47.71.    -Beta 2 microglobulin of 3.4.   5. PET scan on 07/24/2017 reveals several focal areas of increased FDG uptake consistent with multiple myeloma.  There is probable epithelial cyst in tail of the pancreas.  No associated abnormal FDG activity.  Left thyroid cysts or nodules without any FDG activity.  There is a 0.3 cm left upper lobe lung nodule.   8.  Velcade, Revlimid and dexamethasone between on 08/14/2017 and 04/30/2018. Velcade stopped.   -Revlimid and dexamethasone continued.     SUBJECTIVE:  Ms. Dunaway is an 83-year-old female with high-risk IgG kappa multiple myeloma.  She is on Revlimid and dexamethasone.  She used to be on Velcade, Revlimid and dexamethasone.      Patient's main problem is fatigue.  Patient says that she gets tired easily.  She used to be a very active person.  Because of fatigue, she is not able to do much.      She is able to take care of herself.  No headache.  No dizziness.  No chest pain.  No difficulty breathing.  No abdominal pain, nausea or vomiting.  Appetite is fairly good.  No urinary or bowel complaints.  She had neuropathy, which has improved.      PHYSICAL EXAMINATION:    Alert and oriented × 3.  Eyes: No icterus.    Throat: No ulcer.     Neck: No lymphadenopathy.  Nontender.  Axilla: No lymphadenopathy.  Lungs: Good air entry bilaterally.  Heart: Regular.  Abdomen: Soft.  Nondistended.  Nontender.  No mass felt.  Extremities: Mild edema.  Skin: No petechiae.      LABORATORY DATA:  Reviewed.      ASSESSMENT:   1.  An 83-year-old female with high risk IgG kappa multiple myeloma.   2.  Fatigue.   3.  Peripheral neuropathy which has improved.      PLAN:   1.  I discussed with her regarding multiple myeloma.  Overall, it has been stable.  Myeloma labs from today are pending.  We will inform her of the result.   2.  She is on Revlimid and dexamethasone.  Some of the fatigue is from Revlimid. Patient will continue on Revlimid and dexamethasone.  In September, she has some Faith holidays and also lot of family get together.  Because of her fatigue, we discussed regarding holding the Revlimid.  Patient wants to do that, so that she can feel better.  Patient will not take Revlimid for the month of September.  She will continue on weekly dexamethasone.  She will take next cycle of Revlimid, which will start next Monday.   3.  She is on Zometa.  That will be continued.  She is tolerating it well.  No dental or jaw related problems.   4.  I will see her in October with labs.  Patient advised to return sooner if she has chest pain, shortness of breath, recurrent vomiting, bleeding, worsening weakness or any other concerns.         YVES PEREZ MD             D: 2018   T: 2018   MT: HERIBERTO      Name:     DUGLAS CASTILLO   MRN:      [id number]        Account:      [id number]   :      1935           Visit Date:   2018      Document: [id number]

## 2018-08-13 NOTE — PATIENT INSTRUCTIONS
Hold revlimid and Zometa in September.  Please schedule Zometa infusion ASAP  Scheduled/kelly  Continue weekly dexamethasone.  Follow up in October with Zometa infusion  Scheduled/kelly      AVS printed & given to patient/kelly

## 2018-08-13 NOTE — PROGRESS NOTES
"Oncology Rooming Note    August 13, 2018 2:04 PM   Quiana Dunaway is a 83 year old female who presents for:    Chief Complaint   Patient presents with     Oncology Clinic Visit     Multiple myeloma not having achieved remission      Initial Vitals: /61 (BP Location: Left arm, Patient Position: Sitting, Cuff Size: Adult Regular)  Pulse 85  Temp 98.4  F (36.9  C) (Oral)  Resp 16  Wt 70.7 kg (155 lb 12.8 oz)  SpO2 97%  BMI 28.5 kg/m2 Estimated body mass index is 28.5 kg/(m^2) as calculated from the following:    Height as of 8/6/18: 1.575 m (5' 2\").    Weight as of this encounter: 70.7 kg (155 lb 12.8 oz). Body surface area is 1.76 meters squared.  No Pain (0) Comment: Data Unavailable   No LMP recorded. Patient is postmenopausal.  Allergies reviewed: Yes  Medications reviewed: Yes    Medications: MEDICATION REFILLS NEEDED TODAY. Provider was notified. MEDICATION REFILL NEEDED ON ATIVAN AND COMPAZINE  Pharmacy name entered into [facility]: [facility] [place] - [address]    Clinical concerns: None             4 minutes for nursing intake (face to face time)     Isabel Sarmiento MA    Medical Assistant Note:  Quiana Dunaway presents today for Lab visit  Patient seen by provider today: Yes: .   present during visit today: Not Applicable.    Concerns: No Concerns.    Procedure:  Lab draw site: LAC, Needle type: BF, Gauge: 21 g gauze and coban applied.    Post Assessment:  Labs drawn without difficulty: Yes.    Discharge Plan:  Departure Mode: Ambulatory.    Face to Face Time: 4.    Isabel Sarmiento MA                      "

## 2018-08-13 NOTE — Clinical Note
"    8/13/2018         RE: Quiana Dunaway  [address] [facility] [address]        Dear Colleague,    Thank you for referring your patient, Quiana Dunaway, to the [facility]. Please see a copy of my visit note below.    Oncology Rooming Note    August 13, 2018 2:04 PM   Quiana Dunaway is a 83 year old female who presents for:    Chief Complaint   Patient presents with     Oncology Clinic Visit     Multiple myeloma not having achieved remission      Initial Vitals: /61 (BP Location: Left arm, Patient Position: Sitting, Cuff Size: Adult Regular)  Pulse 85  Temp 98.4  F (36.9  C) (Oral)  Resp 16  Wt 70.7 kg (155 lb 12.8 oz)  SpO2 97%  BMI 28.5 kg/m2 Estimated body mass index is 28.5 kg/(m^2) as calculated from the following:    Height as of 8/6/18: 1.575 m (5' 2\").    Weight as of this encounter: 70.7 kg (155 lb 12.8 oz). Body surface area is 1.76 meters squared.  No Pain (0) Comment: Data Unavailable   No LMP recorded. Patient is postmenopausal.  Allergies reviewed: Yes  Medications reviewed: Yes    Medications: MEDICATION REFILLS NEEDED TODAY. Provider was notified. MEDICATION REFILL NEEDED ON ATIVAN AND COMPAZINE  Pharmacy name entered into [facility]: [facility] [place], [place] - [address]    Clinical concerns: None             4 minutes for nursing intake (face to face time)     Isabel Sarmiento MA    Medical Assistant Note:  Quiana Dunaway presents today for Lab visit  Patient seen by provider today: Yes: .   present during visit today: Not Applicable.    Concerns: No Concerns.    Procedure:  Lab draw site: LAC, Needle type: BF, Gauge: 21 g gauze and coban applied.    Post Assessment:  Labs drawn without difficulty: Yes.    Discharge Plan:  Departure Mode: Ambulatory.    Face to Face Time: 4.    Isabel Sarmiento MA                        Again, thank you for allowing me to participate in the care of your patient.        Sincerely,        Alex" MD Sohan

## 2018-08-14 LAB
ALBUMIN SERPL ELPH-MCNC: 3.7 G/DL (ref 3.7–5.1)
ALPHA1 GLOB SERPL ELPH-MCNC: 0.3 G/DL (ref 0.2–0.4)
ALPHA2 GLOB SERPL ELPH-MCNC: 0.7 G/DL (ref 0.5–0.9)
B-GLOBULIN SERPL ELPH-MCNC: 0.6 G/DL (ref 0.6–1)
GAMMA GLOB SERPL ELPH-MCNC: 0.5 G/DL (ref 0.7–1.6)
M PROTEIN SERPL ELPH-MCNC: 0.3 G/DL
PROT PATTERN SERPL ELPH-IMP: ABNORMAL

## 2018-08-14 RX ORDER — DEXAMETHASONE 4 MG/1
20 TABLET ORAL WEEKLY
Qty: 20 TABLET | Refills: 0 | Status: SHIPPED | OUTPATIENT
Start: 2018-08-14 | End: 2018-10-08

## 2018-08-14 RX ORDER — LENALIDOMIDE 10 MG/1
10 CAPSULE ORAL DAILY
Qty: 14 CAPSULE | Refills: 0 | Status: SHIPPED | OUTPATIENT
Start: 2018-08-14 | End: 2018-12-26

## 2018-08-15 LAB
IGG SERPL-MCNC: 489 MG/DL (ref 695–1620)
KAPPA LC UR-MCNC: 2.61 MG/DL (ref 0.33–1.94)
KAPPA LC/LAMBDA SER: 4.21 {RATIO} (ref 0.26–1.65)
LAMBDA LC SERPL-MCNC: 0.62 MG/DL (ref 0.57–2.63)

## 2018-08-15 NOTE — TELEPHONE ENCOUNTER
Call to pt Dr Deluna and made her aware of Dr Worthington recommendations.  Will place order and this can be arranged when they get time.  Pt daughter states that pt is doing well and per Oncology not pt is fatigues but feel related to medication. Freya

## 2018-08-15 NOTE — TELEPHONE ENCOUNTER
Difficult to tell based on the ED H&P.  She should come to clinic to discuss symptoms and make decision about stress test.    She can see one of the APPs.    Thanks.    Alberto

## 2018-08-21 ENCOUNTER — INFUSION THERAPY VISIT (OUTPATIENT)
Dept: INFUSION THERAPY | Facility: CLINIC | Age: 83
End: 2018-08-21
Attending: INTERNAL MEDICINE
Payer: MEDICARE

## 2018-08-21 ENCOUNTER — HOSPITAL ENCOUNTER (OUTPATIENT)
Facility: CLINIC | Age: 83
Setting detail: SPECIMEN
End: 2018-08-21
Attending: INTERNAL MEDICINE
Payer: MEDICARE

## 2018-08-21 VITALS
HEART RATE: 84 BPM | TEMPERATURE: 98.3 F | RESPIRATION RATE: 20 BRPM | DIASTOLIC BLOOD PRESSURE: 67 MMHG | OXYGEN SATURATION: 95 % | SYSTOLIC BLOOD PRESSURE: 102 MMHG

## 2018-08-21 DIAGNOSIS — C90.00 MULTIPLE MYELOMA NOT HAVING ACHIEVED REMISSION (H): Primary | ICD-10-CM

## 2018-08-21 PROCEDURE — 96374 THER/PROPH/DIAG INJ IV PUSH: CPT

## 2018-08-21 PROCEDURE — 25000128 H RX IP 250 OP 636: Performed by: INTERNAL MEDICINE

## 2018-08-21 RX ORDER — ZOLEDRONIC ACID 0.04 MG/ML
4 INJECTION, SOLUTION INTRAVENOUS ONCE
Status: COMPLETED | OUTPATIENT
Start: 2018-08-21 | End: 2018-08-21

## 2018-08-21 RX ADMIN — SODIUM CHLORIDE 250 ML: 9 INJECTION, SOLUTION INTRAVENOUS at 14:00

## 2018-08-21 RX ADMIN — ZOLEDRONIC ACID 4 MG: 0.04 INJECTION, SOLUTION INTRAVENOUS at 14:00

## 2018-08-21 ASSESSMENT — PAIN SCALES - GENERAL: PAINLEVEL: NO PAIN (0)

## 2018-08-21 NOTE — MR AVS SNAPSHOT
After Visit Summary   8/21/2018    Quiana Dunaway    MRN: [id number]           Patient Information     Date Of Birth          1935        Visit Information        Provider Department      8/21/2018 2:00 PM  INFUSION CHAIR 16 [facility]        Today's Diagnoses     Multiple myeloma not having achieved remission (H)    -  1       Follow-ups after your visit        Your next 10 appointments already scheduled     Sep 10, 2018  3:15 PM CDT   Pacemaker Check with GABRIEL MAYERS   [facility] ([facility])    [address]  [facility] [address] [phone] OPT 2            Oct 08, 2018 12:30 PM CDT   Office Visit with César Chung MD   [facility]    [address] [id number] [phone]           Bring a current list of meds and any records pertaining to this visit. For Physicals, please bring immunization records and any forms needing to be filled out. Please arrive 10 minutes early to complete paperwork.            Oct 15, 2018  2:00 PM CDT   Level 1 with  INFUSION CHAIR 3   [facility] ([facility])    [facility]  [address]  [facility] [facility] [address] [phone]            Oct 15, 2018  2:40 PM CDT   Return Visit with Alex Parry MD   [facility] ([facility])    [facility]  [address] [address]  [facility] [id number] [phone]              Who to contact     If you have questions or need follow up information about today's clinic visit or your schedule please contact [facility] directly at [phone].  Normal or non-critical lab and imaging results will be communicated to you by MyChart, letter or phone within 4 business days after the clinic has received the results. If  you do not hear from us within 7 days, please contact the clinic through [employer] or phone. If you have a critical or abnormal lab result, we will notify you by phone as soon as possible.  Submit refill requests through [employer] or call your pharmacy and they will forward the refill request to us. Please allow 3 business days for your refill to be completed.          Additional Information About Your Visit        [employer] Information     [employer] gives you secure access to your electronic health record. If you see a primary care provider, you can also send messages to your care team and make appointments. If you have questions, please call your primary care clinic.  If you do not have a primary care provider, please call [phone] and they will assist you.        Care EveryWhere ID     This is your Care EveryWhere ID. This could be used by other organizations to access your [facility] medical records  [id number]        Your Vitals Were     Pulse Temperature Respirations Pulse Oximetry          84 98.3  F (36.8  C) (Oral) 20 95%         Blood Pressure from Last 3 Encounters:   08/21/18 102/67   08/13/18 129/61   08/06/18 133/75    Weight from Last 3 Encounters:   08/13/18 70.7 kg (155 lb 12.8 oz)   08/06/18 68 kg (150 lb)   07/09/18 68 kg (150 lb)              Today, you had the following     No orders found for display       Primary Care Provider Office Phone # Fax #    César Chung [phone] [address]  [place] [address]        Equal Access to Services     [facility] Center[facility]ESA : Hadii aad ku hadasho Soomaali, waaxda luqadaha, qaybta kaalmada adeegyada, waxay karenin haydiana rodriguez . So [facility] [phone].    ATENCIÓN: Si habla español, tiene a contreras disposición servicios gratuitos de asistencia lingüística. Llame al [phone].    We comply with applicable federal civil rights laws and [place] laws. We do not discriminate on the basis of race, color, national origin, age,  disability, sex, sexual orientation, or gender identity.            Thank you!     Thank you for choosing [facility] AND [facility]  for your care. Our goal is always to provide you with excellent care. Hearing back from our patients is one way we can continue to improve our services. Please take a few minutes to complete the written survey that you may receive in the mail after your visit with us. Thank you!             Your Updated Medication List - Protect others around you: Learn how to safely use, store and throw away your medicines at www.disposemymeds.org.          This list is accurate as of 8/21/18  3:14 PM.  Always use your most recent med list.                   Brand Name Dispense Instructions for use Diagnosis    acyclovir 400 MG tablet    ZOVIRAX    60 tablet    Take 1 tablet (400 mg) by mouth 2 times daily Viral Prophylaxis.    Multiple myeloma not having achieved remission (H)       aspirin 81 MG chewable tablet      Take 81 mg by mouth daily        * dexamethasone 4 MG tablet    DECADRON    20 tablet    Take 5 tablets (20 mg) by mouth once a week Once a week with food on Days 1,8,15.    Multiple myeloma not having achieved remission (H)       * dexamethasone 4 MG tablet    DECADRON    20 tablet    Take 5 tablets (20 mg) by mouth once a week Once a week with food on Days 1,8,15.    Multiple myeloma not having achieved remission (H)       escitalopram 5 MG tablet    LEXAPRO    90 tablet    TAKE 1 TABLET BY MOUTH DAILY    ROBER (generalized anxiety disorder)       IMODIUM A-D PO      Take by mouth as needed        LENalidomide 10 MG Caps capsule CHEMO    REVLIMID    14 capsule    Take 1 capsule (10 mg) by mouth daily for 14 days Take on Days 1 through 14 of 21-day cycle.    Multiple myeloma not having achieved remission (H)       lisinopril 10 MG tablet    PRINIVIL/ZESTRIL    90 tablet    Take 1 tablet (10 mg) by mouth daily    Benign essential hypertension       LORazepam 0.5 MG tablet     ATIVAN    30 tablet    Take 1 tablet (0.5 mg) by mouth every 8 hours as needed (Anxiety, Nausea/Vomiting or Sleep)    Multiple myeloma not having achieved remission (H)       MYRBETRIQ PO      Take 25 mg by mouth daily        nitroGLYcerin 0.4 MG sublingual tablet    NITROSTAT    25 tablet    For chest pain place 1 tablet under the tongue every 5 minutes for 3 doses. If symptoms persist 5 minutes after 1st dose call 911.    Atypical chest pain       * order for DME     1 Units    Dispense one 4 wheeled walker with hand brakes and seat    Multiple myeloma not having achieved remission (H)       * order for DME     1 Units    Wheelchair.    Need for assistance due to unsteady gait       pantoprazole 40 MG EC tablet    PROTONIX    30 tablet    Take 1 tablet (40 mg) by mouth daily for 30 doses        prochlorperazine 10 MG tablet    COMPAZINE    30 tablet    Take 1 tablet (10 mg) by mouth every 6 hours as needed (Nausea/Vomiting)    Multiple myeloma not having achieved remission (H), Fatigue, unspecified type       ZOMETA IV           * Notice:  This list has 4 medication(s) that are the same as other medications prescribed for you. Read the directions carefully, and ask your doctor or other care provider to review them with you.

## 2018-08-21 NOTE — PROGRESS NOTES
Infusion Nursing Note:  Qiuana Dunaway presents today for Zometa.    Patient seen by provider today: No   present during visit today: Not Applicable.    Note: N/A.    Intravenous Access:  Peripheral IV placed.    Treatment Conditions:  Lab Results   Component Value Date    HGB 12.2 08/13/2018     Lab Results   Component Value Date    WBC 4.5 08/13/2018      Lab Results   Component Value Date    ANEU 2.3 08/13/2018     Lab Results   Component Value Date     08/13/2018      Lab Results   Component Value Date     08/13/2018                   Lab Results   Component Value Date    POTASSIUM 3.4 08/13/2018           No results found for: MAG         Lab Results   Component Value Date    CR 0.64 08/13/2018                   Lab Results   Component Value Date    HUMBERTO 8.3 08/13/2018                Lab Results   Component Value Date    BILITOTAL 0.6 08/13/2018           Lab Results   Component Value Date    ALBUMIN 3.3 08/13/2018                    Lab Results   Component Value Date    ALT 21 08/13/2018           Lab Results   Component Value Date    AST 13 08/13/2018       Results reviewed, labs MET treatment parameters, ok to proceed with treatment.      Post Infusion Assessment:  Patient tolerated infusion without incident.  Blood return noted pre and post infusion.  Site patent and intact, free from redness, edema or discomfort.  No evidence of extravasations.  Access discontinued per protocol.    Discharge Plan:   Patient declined prescription refills.  Discharge instructions reviewed with: Patient.  Patient and/or family verbalized understanding of discharge instructions and all questions answered.  AVS to patient via "[employer]HART.  Patient will return as scheduled for next appointment.   Patient discharged in stable condition accompanied by: self.  Departure Mode: Ambulatory.    Isabel Bethea RN

## 2018-09-13 DIAGNOSIS — I10 BENIGN ESSENTIAL HYPERTENSION: ICD-10-CM

## 2018-09-13 NOTE — TELEPHONE ENCOUNTER
"lisinopril (PRINIVIL/ZESTRIL) 10 MG tablet  Last Written Prescription Date:  9/19/2017  Last Fill Quantity: 90,  # refills: 3   Last office visit: 6/18/2018 with prescribing provider:  Juliet   Future Office Visit:   Next 5 appointments (look out 90 days)     Oct 08, 2018 12:30 PM CDT   [facility]    [address] [facility] [id number] [phone]            Oct 15, 2018  2:40 PM CDT   Return Visit with Alex Parry MD   [facility] ([facility])    [facility]  [address] [id number] [phone]                 Requested Prescriptions   Pending Prescriptions Disp Refills     lisinopril (PRINIVIL/ZESTRIL) 10 MG tablet [Pharmacy Med Name: LISINOPRIL 10MG TABLETS] 90 tablet 0     Sig: TAKE 1 TABLET BY MOUTH DAILY    ACE Inhibitors (Including Combos) Protocol Passed    9/13/2018  5:23 PM       Passed - Blood pressure under 140/90 in past 12 months    BP Readings from Last 3 Encounters:   08/21/18 102/67   08/13/18 129/61   08/06/18 133/75                Passed - Recent (12 mo) or future (30 days) visit within the authorizing provider's specialty    Patient had office visit in the last 12 months or has a visit in the next 30 days with authorizing provider or within the authorizing provider's specialty.  See \"Patient Info\" tab in inbasket, or \"Choose Columns\" in Meds & Orders section of the refill encounter.           Passed - Patient is age 18 or older       Passed - No active pregnancy on record       Passed - Normal serum creatinine on file in past 12 months    Recent Labs   Lab Test  08/13/18   1442   07/24/17   0827   CR  0.64   < >   --    CREAT   --    --   0.7    < > = values in this interval not displayed.            Passed - Normal serum potassium on file in past 12 months    Recent Labs   Lab Test  08/13/18   1442   POTASSIUM  3.4            Passed - " No positive pregnancy test in past 12 months

## 2018-09-14 RX ORDER — LISINOPRIL 10 MG/1
10 TABLET ORAL DAILY
Qty: 90 TABLET | Refills: 2 | Status: SHIPPED | OUTPATIENT
Start: 2018-09-14 | End: 2019-06-09

## 2018-09-14 RX ORDER — LISINOPRIL 10 MG/1
TABLET ORAL
Qty: 30 TABLET | Refills: 0 | Status: SHIPPED | OUTPATIENT
Start: 2018-09-14 | End: 2018-09-14

## 2018-09-14 NOTE — TELEPHONE ENCOUNTER
Prescription approved per [facility].  Carlyn Gerber RN- Triage [employer]

## 2018-09-20 ENCOUNTER — TELEPHONE (OUTPATIENT)
Dept: ONCOLOGY | Facility: CLINIC | Age: 83
End: 2018-09-20

## 2018-09-20 NOTE — TELEPHONE ENCOUNTER
Jackie/ Syd in law called this morning stating that Quiana has some tooth decay and needs a tooth extraction, is this ok with Dr. Parry?  Her last zometa was 8/21/18.  Dr. Parry stated it needs to be held 1 month prior to a tooth extraction and for 1 month after.  We also would need a letter from her dentist stating it is ok to resume the zometa.  This information with our fax # was provided to Jackie.([phone])  Will Route to PAUL Lambert for Dr. Parry

## 2018-09-21 ENCOUNTER — DOCUMENTATION ONLY (OUTPATIENT)
Dept: CARDIOLOGY | Facility: CLINIC | Age: 83
End: 2018-09-21

## 2018-09-27 DIAGNOSIS — C90.00 MULTIPLE MYELOMA NOT HAVING ACHIEVED REMISSION (H): Primary | ICD-10-CM

## 2018-09-28 DIAGNOSIS — C90.00 MULTIPLE MYELOMA NOT HAVING ACHIEVED REMISSION (H): ICD-10-CM

## 2018-09-28 RX ORDER — LENALIDOMIDE 10 MG/1
10 CAPSULE ORAL DAILY
Qty: 14 CAPSULE | Refills: 0 | Status: SHIPPED | OUTPATIENT
Start: 2018-09-28 | End: 2018-12-26

## 2018-09-28 RX ORDER — ACYCLOVIR 400 MG/1
400 TABLET ORAL 2 TIMES DAILY
Qty: 60 TABLET | Refills: 5 | Status: SHIPPED | OUTPATIENT
Start: 2018-09-28 | End: 2019-04-10

## 2018-09-28 RX ORDER — DEXAMETHASONE 4 MG/1
20 TABLET ORAL WEEKLY
Qty: 20 TABLET | Refills: 0 | Status: SHIPPED | OUTPATIENT
Start: 2018-09-28 | End: 2018-11-14

## 2018-10-08 ENCOUNTER — OFFICE VISIT (OUTPATIENT)
Dept: FAMILY MEDICINE | Facility: CLINIC | Age: 83
End: 2018-10-08
Payer: MEDICARE

## 2018-10-08 VITALS
DIASTOLIC BLOOD PRESSURE: 58 MMHG | TEMPERATURE: 99.3 F | OXYGEN SATURATION: 96 % | WEIGHT: 155 LBS | BODY MASS INDEX: 28.52 KG/M2 | SYSTOLIC BLOOD PRESSURE: 119 MMHG | HEIGHT: 62 IN | HEART RATE: 80 BPM

## 2018-10-08 DIAGNOSIS — F41.1 GAD (GENERALIZED ANXIETY DISORDER): ICD-10-CM

## 2018-10-08 DIAGNOSIS — I10 BENIGN ESSENTIAL HYPERTENSION: ICD-10-CM

## 2018-10-08 DIAGNOSIS — C90.00 MULTIPLE MYELOMA NOT HAVING ACHIEVED REMISSION (H): Primary | ICD-10-CM

## 2018-10-08 DIAGNOSIS — Z23 NEED FOR PROPHYLACTIC VACCINATION AND INOCULATION AGAINST INFLUENZA: ICD-10-CM

## 2018-10-08 PROCEDURE — 99213 OFFICE O/P EST LOW 20 MIN: CPT | Mod: 25 | Performed by: INTERNAL MEDICINE

## 2018-10-08 PROCEDURE — G0008 ADMIN INFLUENZA VIRUS VAC: HCPCS | Performed by: INTERNAL MEDICINE

## 2018-10-08 PROCEDURE — 90662 IIV NO PRSV INCREASED AG IM: CPT | Performed by: INTERNAL MEDICINE

## 2018-10-08 NOTE — PROGRESS NOTES
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  She is a non-smoker.  She follows with Dr. Parry for her multiple myeloma and had a recent drug holiday from Revlimid coinciding with the Confucian[employer] holidays.  She has restarted Revlimid, and seems to be tolerating it well.  The fatigue that she described at last visit seems to have improved.  She denies shortness of breath or chest pain.  She denies cough, fever, weight loss.  The myrbetriq that she is taking for her overactive bladder seems to be helping.  She denies feeling anxious.  She continues to work 2 jobs.  She has good bowel movements.    Problem list and histories reviewed & adjusted, as indicated.  Additional history: as documented    Patient Active Problem List   Diagnosis     Benign essential hypertension     Pancytopenia (H)     Multiple myeloma not having achieved remission (H)     Benzodiazepine overdose, accidental or unintentional, initial encounter     Atrial fibrillation, unspecified type (H)     Hypokalemia     Pacemaker     Past Surgical History:   Procedure Laterality Date     BONE MARROW BIOPSY, BONE SPECIMEN, NEEDLE/TROCAR N/A 7/12/2017    Procedure: BIOPSY BONE MARROW;  BONE MARROW BIOPSY ;  Surgeon: Grace Vela MD;  Location:  GI     IMPLANT PACEMAKER  03/2017    AV block       Social History   Substance Use Topics     Smoking status: Never Smoker     Smokeless tobacco: Never Used     Alcohol use No     Family History   Problem Relation Age of Onset     Unknown/Adopted Mother      Unknown/Adopted Father          Current Outpatient Prescriptions   Medication Sig Dispense Refill     acyclovir (ZOVIRAX) 400 MG tablet Take 1 tablet (400 mg) by mouth 2 times daily Viral  Prophylaxis. 60 tablet 5     aspirin 81 MG chewable tablet Take 81 mg by mouth daily        dexamethasone (DECADRON) 4 MG tablet Take 5 tablets (20 mg) by mouth once a week Once a week with food on Days 1,8,15. 20 tablet 0     escitalopram (LEXAPRO) 5 MG tablet TAKE 1 TABLET BY MOUTH DAILY 90 tablet 0     LENalidomide (REVLIMID) 10 MG CAPS capsule CHEMO Take 1 capsule (10 mg) by mouth daily for 14 days Take on Days 1 through 14 of 21-day cycle. 14 capsule 0     lisinopril (PRINIVIL/ZESTRIL) 10 MG tablet Take 1 tablet (10 mg) by mouth daily 90 tablet 2     Loperamide HCl (IMODIUM A-D PO) Take by mouth as needed       LORazepam (ATIVAN) 0.5 MG tablet Take 1 tablet (0.5 mg) by mouth every 8 hours as needed (Anxiety, Nausea/Vomiting or Sleep) 30 tablet 3     Mirabegron (MYRBETRIQ PO) Take 25 mg by mouth daily       nitroglycerin (NITROSTAT) 0.4 MG sublingual tablet For chest pain place 1 tablet under the tongue every 5 minutes for 3 doses. If symptoms persist 5 minutes after 1st dose call 911. 25 tablet 0     order for DME Wheelchair. 1 Units 0     order for DME Dispense one 4 wheeled walker with hand brakes and seat 1 Units 0     prochlorperazine (COMPAZINE) 10 MG tablet Take 1 tablet (10 mg) by mouth every 6 hours as needed (Nausea/Vomiting) 30 tablet 3     Zoledronic Acid (ZOMETA IV)        [DISCONTINUED] dexamethasone (DECADRON) 4 MG tablet Take 5 tablets (20 mg) by mouth once a week Once a week with food on Days 1,8,15. 20 tablet 0     [DISCONTINUED] dexamethasone (DECADRON) 4 MG tablet Take 5 tablets (20 mg) by mouth once a week Once a week with food on Days 1,8,15. 20 tablet 0     No Known Allergies    Reviewed and updated as needed this visit by clinical staff       Reviewed and updated as needed this visit by Provider         ROS:  Constitutional, HEENT, cardiovascular, pulmonary, gi and gu systems are negative, except as otherwise noted.    OBJECTIVE:     /58 (BP Location: Right arm, Cuff Size: Adult  "Regular)  Pulse 80  Temp 99.3  F (37.4  C) (Tympanic)  Ht 5' 2\" (1.575 m)  Wt 155 lb (70.3 kg)  SpO2 96%  Breastfeeding? No  BMI 28.35 kg/m2  Body mass index is 28.35 kg/(m^2).  GENERAL: healthy, alert and no distress  EYES: Eyes grossly normal to inspection, PERRL and conjunctivae and sclerae normal  HENT: ear canals and TM's normal, nose and mouth without ulcers or lesions  NECK: no adenopathy, no asymmetry, masses, or scars and thyroid normal to palpation  RESP: lungs clear to auscultation - no rales, rhonchi or wheezes  CV: regular rate and rhythm, normal S1 S2, no S3 or S4, no murmur, click or rub, no peripheral edema and peripheral pulses strong  ABDOMEN: soft, nontender, no hepatosplenomegaly, no masses and bowel sounds normal  MS: no gross musculoskeletal defects noted, no edema  SKIN: no suspicious lesions or rashes  NEURO: Normal strength and tone, mentation intact and speech normal  PSYCH: mentation appears normal, affect normal/bright    Diagnostic Test Results:  Results for orders placed or performed in visit on 08/13/18   CBC with platelets and differential   Result Value Ref Range    WBC 4.5 4.0 - 11.0 10e9/L    RBC Count 4.03 3.8 - 5.2 10e12/L    Hemoglobin 12.2 11.7 - 15.7 g/dL    Hematocrit 36.1 35.0 - 47.0 %    MCV 90 78 - 100 fl    MCH 30.3 26.5 - 33.0 pg    MCHC 33.8 31.5 - 36.5 g/dL    RDW 14.3 10.0 - 15.0 %    Platelet Count 135 (L) 150 - 450 10e9/L    Diff Method Automated Method     % Neutrophils 51.4 %    % Lymphocytes 28.8 %    % Monocytes 15.7 %    % Eosinophils 3.5 %    % Basophils 0.4 %    % Immature Granulocytes 0.2 %    Nucleated RBCs 0 0 /100    Absolute Neutrophil 2.3 1.6 - 8.3 10e9/L    Absolute Lymphocytes 1.3 0.8 - 5.3 10e9/L    Absolute Monocytes 0.7 0.0 - 1.3 10e9/L    Absolute Eosinophils 0.2 0.0 - 0.7 10e9/L    Absolute Basophils 0.0 0.0 - 0.2 10e9/L    Abs Immature Granulocytes 0.0 0 - 0.4 10e9/L    Absolute Nucleated RBC 0.0    **Comprehensive metabolic panel FUTURE " anytime   Result Value Ref Range    Sodium 142 133 - 144 mmol/L    Potassium 3.4 3.4 - 5.3 mmol/L    Chloride 107 94 - 109 mmol/L    Carbon Dioxide 27 20 - 32 mmol/L    Anion Gap 8 3 - 14 mmol/L    Glucose 92 70 - 99 mg/dL    Urea Nitrogen 15 7 - 30 mg/dL    Creatinine 0.64 0.52 - 1.04 mg/dL    GFR Estimate 89 >60 mL/min/1.7m2    GFR Estimate If Black >90 >60 mL/min/1.7m2    Calcium 8.3 (L) 8.5 - 10.1 mg/dL    Bilirubin Total 0.6 0.2 - 1.3 mg/dL    Albumin 3.3 (L) 3.4 - 5.0 g/dL    Protein Total 6.2 (L) 6.8 - 8.8 g/dL    Alkaline Phosphatase 78 40 - 150 U/L    ALT 21 0 - 50 U/L    AST 13 0 - 45 U/L   IgG   Result Value Ref Range     (L) 695 - 1620 mg/dL   Protein electrophoresis   Result Value Ref Range    Albumin Fraction 3.7 3.7 - 5.1 g/dL    Alpha 1 Fraction 0.3 0.2 - 0.4 g/dL    Alpha 2 Fraction 0.7 0.5 - 0.9 g/dL    Beta Fraction 0.6 0.6 - 1.0 g/dL    Gamma Fraction 0.5 (L) 0.7 - 1.6 g/dL    Monoclonal Peak 0.3 (H) 0.0 g/dL    ELP Interpretation:       Small monoclonal protein (0.3 g/dL) seen in the gamma fraction. 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 Pathologic significance requires clinical correlation.  MAURY Hutson M.D.,   Pathologist.      Cavour and lambda light chain   Result Value Ref Range    Kappa Free Lt Chain 2.61 (H) 0.33 - 1.94 mg/dL    Lambda Free Lt Chain 0.62 0.57 - 2.63 mg/dL    Kappa Lambda Ratio 4.21 (H) 0.26 - 1.65       ASSESSMENT/PLAN:       1. Multiple myeloma not having achieved remission (H)  This condition seems to be responding well to treatments as per Dr. Parry from hematology oncology.  Continue follow-up with him as directed.    2. Benign essential hypertension  Blood pressure is under good control today in clinic.  Continue current lisinopril.    3. ROBER (generalized anxiety disorder)  Mood symptoms are well controlled in clinic today.  Continue Lexapro.    4. Need for prophylactic vaccination  and inoculation against influenza    - FLU VACCINE, INCREASED ANTIGEN, PRESV FREE, AGE 65+ [18205]  - ADMIN INFLUENZA  (For MEDICARE Patients ONLY) []        César Chung MD  [facility]

## 2018-10-08 NOTE — PROGRESS NOTES

## 2018-10-08 NOTE — MR AVS SNAPSHOT
After Visit Summary   10/8/2018    Quiana Dunaway    MRN: [id number]           Patient Information     Date Of Birth          1935        Visit Information        Provider Department      10/8/2018 12:30 PM César Chung MD [facility]        Today's Diagnoses     Multiple myeloma not having achieved remission (H)    -  1    Benign essential hypertension        ROBER (generalized anxiety disorder)        Need for prophylactic vaccination and inoculation against influenza           Follow-ups after your visit        Follow-up notes from your care team     Return in about 2 months (around 12/8/2018) for Blood Pressure Check.      Your next 10 appointments already scheduled     Oct 15, 2018  2:00 PM CDT   Level 1 with  INFUSION CHAIR 3   [facility] ([facility])    [facility]  [address]  [facility] [address]   [phone]            Oct 15, 2018  2:40 PM CDT   Return Visit with Alex Parry MD   [facility] ([facility])    [facility]  [address]  [facility] [address]   [phone]            Oct 16, 2018  1:45 PM CDT   Pacemaker Check with RIOS ELSAN   [facility] ([facility])    [address]  [facility] [address] [phone] OPT 2            Dec 12, 2018  1:30 PM CST   [facility] C[facility]ar [facility]ung [facility]   [facility]    [address] [facility] [address] [phone]           Bring a current list of meds and any records pertaining to this visit. For Physicals, please bring immunization records and any forms needing to be filled out. Please arrive 10 minutes early to complete paperwork.              Future tests that were ordered for you today     Open Future Orders        Priority Expected Expires Ordered    DEXA  "HIP/PELVIS/SPINE - Future Routine  10/8/2019 10/8/2018            Who to contact     If you have questions or need follow up information about today's clinic visit or your schedule please contact [facility] directly at [phone].  Normal or non-critical lab and imaging results will be communicated to you by MyChart, letter or phone within 4 business days after the clinic has received the results. If you do not hear from us within 7 days, please contact the clinic through [employer] or phone. If you have a critical or abnormal lab result, we will notify you by phone as soon as possible.  Submit refill requests through [employer] or call your pharmacy and they will forward the refill request to us. Please allow 3 business days for your refill to be completed.          Additional Information About Your Visit        [employer] Information     [employer] gives you secure access to your electronic health record. If you see a primary care provider, you can also send messages to your care team and make appointments. If you have questions, please call your primary care clinic.  If you do not have a primary care provider, please call [phone] and they will assist you.        Care EveryWhere ID     This is your Care EveryWhere ID. This could be used by other organizations to access your [facility] medical records  [id number]        Your Vitals Were     Pulse Temperature Height Pulse Oximetry Breastfeeding? BMI (Body Mass Index)    80 99.3  F (37.4  C) (Tympanic) 5' 2\" (1.575 m) 96% No 28.35 kg/m2       Blood Pressure from Last 3 Encounters:   10/08/18 119/58   08/21/18 102/67   08/13/18 129/61    Weight from Last 3 Encounters:   10/08/18 155 lb (70.3 kg)   08/13/18 155 lb 12.8 oz (70.7 kg)   08/06/18 150 lb (68 kg)              We Performed the Following     ADMIN INFLUENZA  (For MEDICARE Patients ONLY) []     FLU VACCINE, INCREASED ANTIGEN, PRESV FREE, AGE 65+ [56995]        Primary Care Provider Office Phone # " Fax #    César Chung [phone] [address] [facility] [address]  [facility] [address]        Equal Access to Services     LILLIANA SANDOVAL : Hadii aad ku hadivelisseyaquelin Sonellie, wajoeda luqadaha, qaybta kaalmada miriam, [place] hortonfrancisco careydenzel young jennifer villeda. So [facility] [phone].    ATENCIÓN: Si habla español, tiene a contreras disposición servicios gratuitos de asistencia lingüística. Llame al [phone].    We comply with applicable federal civil rights laws and [place] laws. We do not discriminate on the basis of race, color, national origin, age, disability, sex, sexual orientation, or gender identity.            Thank you!     Thank you for choosing [facility]  for your care. Our goal is always to provide you with excellent care. Hearing back from our patients is one way we can continue to improve our services. Please take a few minutes to complete the written survey that you may receive in the mail after your visit with us. Thank you!             Your Updated Medication List - Protect others around you: Learn how to safely use, store and throw away your medicines at www.disposemymeds.org.          This list is accurate as of 10/8/18  1:12 PM.  Always use your most recent med list.                   Brand Name Dispense Instructions for use Diagnosis    acyclovir 400 MG tablet    ZOVIRAX    60 tablet    Take 1 tablet (400 mg) by mouth 2 times daily Viral Prophylaxis.    Multiple myeloma not having achieved remission (H)       aspirin 81 MG chewable tablet      Take 81 mg by mouth daily        dexamethasone 4 MG tablet    DECADRON    20 tablet    Take 5 tablets (20 mg) by mouth once a week Once a week with food on Days 1,8,15.    Multiple myeloma not having achieved remission (H)       escitalopram 5 MG tablet    LEXAPRO    90 tablet    TAKE 1 TABLET BY MOUTH DAILY    ROBER (generalized anxiety disorder)       IMODIUM A-D PO      Take by mouth as needed        LENalidomide 10 MG Caps capsule  CHEMO    REVLIMID    14 capsule    Take 1 capsule (10 mg) by mouth daily for 14 days Take on Days 1 through 14 of 21-day cycle.    Multiple myeloma not having achieved remission (H)       lisinopril 10 MG tablet    PRINIVIL/ZESTRIL    90 tablet    Take 1 tablet (10 mg) by mouth daily    Benign essential hypertension       LORazepam 0.5 MG tablet    ATIVAN    30 tablet    Take 1 tablet (0.5 mg) by mouth every 8 hours as needed (Anxiety, Nausea/Vomiting or Sleep)    Multiple myeloma not having achieved remission (H)       MYRBETRIQ PO      Take 25 mg by mouth daily        nitroGLYcerin 0.4 MG sublingual tablet    NITROSTAT    25 tablet    For chest pain place 1 tablet under the tongue every 5 minutes for 3 doses. If symptoms persist 5 minutes after 1st dose call 911.    Atypical chest pain       * order for DME     1 Units    Dispense one 4 wheeled walker with hand brakes and seat    Multiple myeloma not having achieved remission (H)       * order for DME     1 Units    Wheelchair.    Need for assistance due to unsteady gait       prochlorperazine 10 MG tablet    COMPAZINE    30 tablet    Take 1 tablet (10 mg) by mouth every 6 hours as needed (Nausea/Vomiting)    Multiple myeloma not having achieved remission (H), Fatigue, unspecified type       ZOMETA IV           * Notice:  This list has 2 medication(s) that are the same as other medications prescribed for you. Read the directions carefully, and ask your doctor or other care provider to review them with you.

## 2018-10-11 DIAGNOSIS — C90.00 MULTIPLE MYELOMA NOT HAVING ACHIEVED REMISSION (H): Primary | ICD-10-CM

## 2018-10-11 RX ORDER — ZOLEDRONIC ACID 0.04 MG/ML
4 INJECTION, SOLUTION INTRAVENOUS ONCE
Status: CANCELLED | OUTPATIENT
Start: 2018-10-15 | End: 2018-10-11

## 2018-10-15 ENCOUNTER — ONCOLOGY VISIT (OUTPATIENT)
Dept: ONCOLOGY | Facility: CLINIC | Age: 83
End: 2018-10-15
Attending: INTERNAL MEDICINE
Payer: MEDICARE

## 2018-10-15 ENCOUNTER — DOCUMENTATION ONLY (OUTPATIENT)
Dept: PHARMACY | Facility: CLINIC | Age: 83
End: 2018-10-15

## 2018-10-15 ENCOUNTER — HOSPITAL ENCOUNTER (OUTPATIENT)
Facility: CLINIC | Age: 83
Setting detail: SPECIMEN
Discharge: HOME | End: 2018-10-15
Attending: INTERNAL MEDICINE | Admitting: INTERNAL MEDICINE
Payer: MEDICARE

## 2018-10-15 ENCOUNTER — INFUSION THERAPY VISIT (OUTPATIENT)
Dept: INFUSION THERAPY | Facility: CLINIC | Age: 83
End: 2018-10-15
Attending: INTERNAL MEDICINE
Payer: MEDICARE

## 2018-10-15 VITALS
HEART RATE: 75 BPM | TEMPERATURE: 98.3 F | RESPIRATION RATE: 16 BRPM | DIASTOLIC BLOOD PRESSURE: 73 MMHG | OXYGEN SATURATION: 94 % | SYSTOLIC BLOOD PRESSURE: 119 MMHG

## 2018-10-15 VITALS
HEART RATE: 75 BPM | RESPIRATION RATE: 16 BRPM | SYSTOLIC BLOOD PRESSURE: 119 MMHG | DIASTOLIC BLOOD PRESSURE: 73 MMHG | OXYGEN SATURATION: 94 % | TEMPERATURE: 98.3 F

## 2018-10-15 DIAGNOSIS — C90.00 MULTIPLE MYELOMA NOT HAVING ACHIEVED REMISSION (H): Primary | ICD-10-CM

## 2018-10-15 LAB
ALBUMIN SERPL-MCNC: 3.4 G/DL (ref 3.4–5)
ALP SERPL-CCNC: 79 U/L (ref 40–150)
ALT SERPL W P-5'-P-CCNC: 18 U/L (ref 0–50)
ANION GAP SERPL CALCULATED.3IONS-SCNC: 6 MMOL/L (ref 3–14)
AST SERPL W P-5'-P-CCNC: 12 U/L (ref 0–45)
BASOPHILS # BLD AUTO: 0 10E9/L (ref 0–0.2)
BASOPHILS NFR BLD AUTO: 0.2 %
BILIRUB SERPL-MCNC: 0.5 MG/DL (ref 0.2–1.3)
BUN SERPL-MCNC: 16 MG/DL (ref 7–30)
CALCIUM SERPL-MCNC: 8.6 MG/DL (ref 8.5–10.1)
CHLORIDE SERPL-SCNC: 107 MMOL/L (ref 94–109)
CO2 SERPL-SCNC: 31 MMOL/L (ref 20–32)
CREAT SERPL-MCNC: 0.91 MG/DL (ref 0.52–1.04)
DIFFERENTIAL METHOD BLD: NORMAL
EOSINOPHIL # BLD AUTO: 0.1 10E9/L (ref 0–0.7)
EOSINOPHIL NFR BLD AUTO: 2 %
ERYTHROCYTE [DISTWIDTH] IN BLOOD BY AUTOMATED COUNT: 12.6 % (ref 10–15)
GFR SERPL CREATININE-BSD FRML MDRD: 59 ML/MIN/1.7M2
GLUCOSE SERPL-MCNC: 103 MG/DL (ref 70–99)
HCT VFR BLD AUTO: 36 % (ref 35–47)
HGB BLD-MCNC: 12.2 G/DL (ref 11.7–15.7)
IMM GRANULOCYTES # BLD: 0.1 10E9/L (ref 0–0.4)
IMM GRANULOCYTES NFR BLD: 0.8 %
LYMPHOCYTES # BLD AUTO: 1.4 10E9/L (ref 0.8–5.3)
LYMPHOCYTES NFR BLD AUTO: 22 %
MCH RBC QN AUTO: 30.7 PG (ref 26.5–33)
MCHC RBC AUTO-ENTMCNC: 33.9 G/DL (ref 31.5–36.5)
MCV RBC AUTO: 91 FL (ref 78–100)
MONOCYTES # BLD AUTO: 0.7 10E9/L (ref 0–1.3)
MONOCYTES NFR BLD AUTO: 10.5 %
NEUTROPHILS # BLD AUTO: 4.2 10E9/L (ref 1.6–8.3)
NEUTROPHILS NFR BLD AUTO: 64.5 %
NRBC # BLD AUTO: 0 10*3/UL
NRBC BLD AUTO-RTO: 0 /100
PLATELET # BLD AUTO: 210 10E9/L (ref 150–450)
POTASSIUM SERPL-SCNC: 3.1 MMOL/L (ref 3.4–5.3)
PROT SERPL-MCNC: 6.5 G/DL (ref 6.8–8.8)
RBC # BLD AUTO: 3.98 10E12/L (ref 3.8–5.2)
SODIUM SERPL-SCNC: 144 MMOL/L (ref 133–144)
WBC # BLD AUTO: 6.5 10E9/L (ref 4–11)

## 2018-10-15 PROCEDURE — 00000402 ZZHCL STATISTIC TOTAL PROTEIN: Performed by: INTERNAL MEDICINE

## 2018-10-15 PROCEDURE — G0463 HOSPITAL OUTPT CLINIC VISIT: HCPCS

## 2018-10-15 PROCEDURE — 83883 ASSAY NEPHELOMETRY NOT SPEC: CPT | Performed by: INTERNAL MEDICINE

## 2018-10-15 PROCEDURE — 25000132 ZZH RX MED GY IP 250 OP 250 PS 637: Mod: GY | Performed by: INTERNAL MEDICINE

## 2018-10-15 PROCEDURE — 99214 OFFICE O/P EST MOD 30 MIN: CPT | Performed by: INTERNAL MEDICINE

## 2018-10-15 PROCEDURE — 82784 ASSAY IGA/IGD/IGG/IGM EACH: CPT | Performed by: INTERNAL MEDICINE

## 2018-10-15 PROCEDURE — 84165 PROTEIN E-PHORESIS SERUM: CPT | Performed by: INTERNAL MEDICINE

## 2018-10-15 PROCEDURE — 85025 COMPLETE CBC W/AUTO DIFF WBC: CPT | Performed by: INTERNAL MEDICINE

## 2018-10-15 PROCEDURE — 36415 COLL VENOUS BLD VENIPUNCTURE: CPT

## 2018-10-15 PROCEDURE — 80053 COMPREHEN METABOLIC PANEL: CPT | Performed by: INTERNAL MEDICINE

## 2018-10-15 PROCEDURE — A9270 NON-COVERED ITEM OR SERVICE: HCPCS | Mod: GY | Performed by: INTERNAL MEDICINE

## 2018-10-15 RX ORDER — POTASSIUM CHLORIDE 750 MG/1
40 TABLET, EXTENDED RELEASE ORAL ONCE
Status: COMPLETED | OUTPATIENT
Start: 2018-10-15 | End: 2018-10-15

## 2018-10-15 RX ORDER — ZOLEDRONIC ACID 0.04 MG/ML
4 INJECTION, SOLUTION INTRAVENOUS ONCE
Status: CANCELLED | OUTPATIENT
Start: 2018-12-18 | End: 2018-11-12

## 2018-10-15 RX ADMIN — POTASSIUM CHLORIDE 40 MEQ: 750 TABLET, FILM COATED, EXTENDED RELEASE ORAL at 15:26

## 2018-10-15 NOTE — Clinical Note
"    10/15/2018         RE: Quiana Dunaway  [address] [facility] [address]        Dear Colleague,    Thank you for referring your patient, Quiana Dunaway, to the [facility]. Please see a copy of my visit note below.    Oncology Rooming Note    October 15, 2018 2:14 PM   Quiana Dunaway is a 83 year old female who presents for:    Chief Complaint   Patient presents with     Oncology Clinic Visit     Multiple myeloma not having achieved remission      Initial Vitals: /73  Pulse 75  Temp 98.3  F (36.8  C) (Oral)  Resp 16  SpO2 94% Estimated body mass index is 28.35 kg/(m^2) as calculated from the following:    Height as of 10/8/18: 1.575 m (5' 2\").    Weight as of 10/8/18: 70.3 kg (155 lb). There is no height or weight on file to calculate BSA.  Data Unavailable Comment: Data Unavailable   No LMP recorded. Patient is postmenopausal.  Allergies reviewed: Yes  Medications reviewed: Yes    Medications: Medication refills not needed today.  Pharmacy name entered into [facility]:    [facility] [place] - [address] [facility] [address] [id number] [place] [address] - [address] [facility] [address]    Clinical concerns: None                 4 minutes for nursing intake (face to face time)     Isabel Sarmiento MA              Visit Date:   10/15/2018     HEMATOLOGY HISTORY: Mrs. Dunaway is a lady with multiple myeloma (IgG kappa). High risk, t(14;16).  1. On 05/01/2017, WBC of 3.4, hemoglobin of 10.2 and platelet of 154.  2. SPEP on 07/07/2017 revealed M-spike of 1.8.  3. Bone marrow biopsy on 07/12/2017 reveals kappa monotypic plasma cell population consistent with multiple myeloma.  Bone marrow is 70% cellular.  Plasma cell is 50-60%.     -There is gain of chromosome 1, 5, 9, 15, and 17.  There is translocation 14;16.   4.  On 07/18/2017:  -M-spike of 1.9.   -Immunofixation reveals monoclonal IgG kappa.    -IgG level of 2970.  IgA of 15 and IgM of 175.    -Kappa free " light chain of 67.7.  Lambda free light chain of 1.42.  Ratio of kappa to lambda of 47.71.    -Beta 2 microglobulin of 3.4.   5. PET scan on 07/24/2017 reveals several focal areas of increased FDG uptake consistent with multiple myeloma.  There is probable epithelial cyst in tail of the pancreas.  No associated abnormal FDG activity.  Left thyroid cysts or nodules without any FDG activity.  There is a 0.3 cm left upper lobe lung nodule.   8.  Velcade, Revlimid and dexamethasone between on 08/14/2017 and 04/30/2018. Velcade stopped.   -Revlimid and dexamethasone continued.     SUBJECTIVE:   Ms. Dunaway is an 83-year-old female with IgG kappa multiple myeloma.  She is currently on Revlimid and dexamethasone.  She is doing well.  She did not take Revlimid in the month of September.  She restarted her Revlimid on 10/07/2018.  She is tolerating it well.  She continues on weekly dexamethasone.      Overall, she is doing good.  Her fatigue is a little less.  No headache.  No dizziness.  No chest pain.  No difficulty breathing.  No abdominal pain, nausea or vomiting.  Appetite is good.  No urinary or bowel complaints.  No bleeding.  No fever, chills or night sweats.      Patient recently had some dental workup done.  She is scheduled for Zometa today.  Dental work was 2 weeks ago.  We will delay Zometa for a month.      PHYSICAL EXAMINATION:    Alert and oriented × 3.   Vitals: Reviewed. ECOG PS of 1.  Eyes: No icterus.    Throat: No ulcer.    Neck: No lymphadenopathy.  Nontender.  Axilla: No lymphadenopathy.  Lungs: Good air entry bilaterally.  Heart: Regular.  Abdomen: Soft.  Nondistended.  Nontender.  No mass felt.  Extremities: Mild edema.  Skin: No petechiae.      LABORATORY DATA:  Reviewed.      ASSESSMENT:   1.  An 83-year-old female with IgG kappa multiple myeloma.   2.  Fatigue which is slightly better.  Improvement in fatigue is due to the fact that she was not taking Revlimid for about a month.   3.  Mild  hypokalemia.      PLAN:   1.  Discussed with her regarding multiple myeloma. Patient clinically is stable.  She will continue on Revlimid and dexamethasone.  She is tolerating it well.  Side effects reviewed. Free light chain from today is pending.  We will inform her of the result.    2.  As patient had dental workup done 2 weeks ago, we will delay Zometa for about a month.     3.  Patient has mild hypokalemia.  Oral potassium replacement will be given.     4.  Patient had multiple questions, which were all answered.  I will see her in 4-6 weeks' time.  I advised her to see a physician sooner if she has any fever, chills, worsening weakness, shortness of breath, recurrent vomiting, bleeding or any other concerns.     ADDENDUM: M-spike and kappa free light chain mildly higher. This is due to the fact that she did not take revlimid in September. Will continue the same treatment.        YVES PEREZ MD             D: 10/15/2018   T: 10/16/2018   MT: LUIS      Name:     DUGLAS CASTILLO   MRN:      [id number]        Account:      [id number]   :      1935           Visit Date:   10/15/2018      Document: [id number]        Again, thank you for allowing me to participate in the care of your patient.        Sincerely,        Yves Perez MD

## 2018-10-15 NOTE — PROGRESS NOTES
"Oncology Rooming Note    October 15, 2018 2:14 PM   Quiana Dunaway is a 83 year old female who presents for:    Chief Complaint   Patient presents with     Oncology Clinic Visit     Multiple myeloma not having achieved remission      Initial Vitals: /73  Pulse 75  Temp 98.3  F (36.8  C) (Oral)  Resp 16  SpO2 94% Estimated body mass index is 28.35 kg/(m^2) as calculated from the following:    Height as of 10/8/18: 1.575 m (5' 2\").    Weight as of 10/8/18: 70.3 kg (155 lb). There is no height or weight on file to calculate BSA.  Data Unavailable Comment: Data Unavailable   No LMP recorded. Patient is postmenopausal.  Allergies reviewed: Yes  Medications reviewed: Yes    Medications: Medication refills not needed today.  Pharmacy name entered into [facility]:    [facility] [place], [place] - [address] [facility] [id number] [place] [address], [place] - [address] [facility] [address]    Clinical concerns: None                 4 minutes for nursing intake (face to face time)     Isabel Sarmiento MA            "

## 2018-10-15 NOTE — MR AVS SNAPSHOT
After Visit Summary   10/15/2018    Quiana Dunaway    MRN: [id number]           Patient Information     Date Of Birth          1935        Visit Information        Provider Department      10/15/2018 2:00 PM  INFUSION CHAIR 3 [facility]        Today's Diagnoses     Multiple myeloma not having achieved remission (H)    -  1       Follow-ups after your visit        Your next 10 appointments already scheduled     Oct 16, 2018  1:45 PM CDT   Pacemaker Check with GABRIEL HUNTERN   [facility] ([facility])    [address]  [facility] [address]   [phone] OPT 2            Oct 29, 2018  2:00 PM CDT   Return Visit with  Oncology Nurse   [facility] ([facility])    [facility]  [address]  [facility] [address]   [phone]            Nov 14, 2018 10:00 AM CST   Level 1 with  INFUSION CHAIR 13   [facility] ([facility])    [facility]  [address]  [facility] [address]   [phone]            Nov 14, 2018 10:40 AM CST   Return Visit with Alex Parry MD   [facility] ([facility])    [facility]  [address]  [facility] [address]   [phone]            Dec 12, 2018  1:30 PM CST   Office Visit with César Chung MD   [facility]    [address] [facility] [address]   [phone]           Bring a current list of meds and any records pertaining to this visit. For Physicals, please bring immunization records and any forms needing to be filled out. Please arrive 10 minutes early to complete paperwork.              Future tests that were ordered for you today     Open Standing Orders        Priority Remaining Interval Expires Ordered    IgG Routine 1/1 AM  DRAW  10/15/2018    Protein electrophoresis Routine 1/1 AM DRAW  10/15/2018    Kappa and lambda light chain Routine 1/1 AM DRAW  10/15/2018            Who to contact     If you have questions or need follow up information about today's clinic visit or your schedule please contact [facility] directly at [phone].  Normal or non-critical lab and imaging results will be communicated to you by All Access Telecomhart, letter or phone within 4 business days after the clinic has received the results. If you do not hear from us within 7 days, please contact the clinic through Arkamit or phone. If you have a critical or abnormal lab result, we will notify you by phone as soon as possible.  Submit refill requests through AlterG or call your pharmacy and they will forward the refill request to us. Please allow 3 business days for your refill to be completed.          Additional Information About Your Visit        All Access Tele[employer]Nanosphere Information     [employer] gives you secure access to your electronic health record. If you see a primary care provider, you can also send messages to your care team and make appointments. If you have questions, please call your primary care clinic.  If you do not have a primary care provider, please call [phone] and they will assist you.        Care EveryWhere ID     This is your Care EveryWhere ID. This could be used by other organizations to access your [facility] medical records  [id number]        Your Vitals Were     Pulse Temperature Respirations Pulse Oximetry          75 98.3  F (36.8  C) (Oral) 16 94%         Blood Pressure from Last 3 Encounters:   10/15/18 119/73   10/15/18 119/73   10/08/18 119/58    Weight from Last 3 Encounters:   10/08/18 70.3 kg (155 lb)   08/13/18 70.7 kg (155 lb 12.8 oz)   08/06/18 68 kg (150 lb)              We Performed the Following     CBC with platelets differential     Comprehensive metabolic panel     IgG     Kappa and lambda light chain      Protein electrophoresis        Primary Care Provider Office Phone # Fax #    César Chung [phone] [address] [address]        Equal Access to Services     LILLIANA SANDOVAL : Hadii althea ku hadivelisseo Soterrieali, waaxda luqadaha, qaybta kaalmada adeegyada, [place] young laNellydiana villeda. So [facility] [phone].    ATENCIÓN: Si habla español, tiene a contreras disposición servicios gratuitos de asistencia lingüística. Llame al [phone].    We comply with applicable federal civil rights laws and [place] laws. We do not discriminate on the basis of race, color, national origin, age, disability, sex, sexual orientation, or gender identity.            Thank you!     Thank you for choosing [facility]  for your care. Our goal is always to provide you with excellent care. Hearing back from our patients is one way we can continue to improve our services. Please take a few minutes to complete the written survey that you may receive in the mail after your visit with us. Thank you!             Your Updated Medication List - Protect others around you: Learn how to safely use, store and throw away your medicines at www.disposemymeds.org.          This list is accurate as of 10/15/18  3:57 PM.  Always use your most recent med list.                   Brand Name Dispense Instructions for use Diagnosis    acyclovir 400 MG tablet    ZOVIRAX    60 tablet    Take 1 tablet (400 mg) by mouth 2 times daily Viral Prophylaxis.    Multiple myeloma not having achieved remission (H)       aspirin 81 MG chewable tablet      Take 81 mg by mouth daily        dexamethasone 4 MG tablet    DECADRON    20 tablet    Take 5 tablets (20 mg) by mouth once a week Once a week with food on Days 1,8,15.    Multiple myeloma not having achieved remission (H)       escitalopram 5 MG tablet    LEXAPRO    90 tablet    TAKE 1 TABLET BY MOUTH DAILY    ROBER (generalized anxiety disorder)        IMODIUM A-D PO      Take by mouth as needed        LENalidomide 10 MG Caps capsule CHEMO    REVLIMID    14 capsule    Take 1 capsule (10 mg) by mouth daily for 14 days Take on Days 1 through 14 of 21-day cycle.    Multiple myeloma not having achieved remission (H)       lisinopril 10 MG tablet    PRINIVIL/ZESTRIL    90 tablet    Take 1 tablet (10 mg) by mouth daily    Benign essential hypertension       LORazepam 0.5 MG tablet    ATIVAN    30 tablet    Take 1 tablet (0.5 mg) by mouth every 8 hours as needed (Anxiety, Nausea/Vomiting or Sleep)    Multiple myeloma not having achieved remission (H)       MYRBETRIQ PO      Take 25 mg by mouth daily        nitroGLYcerin 0.4 MG sublingual tablet    NITROSTAT    25 tablet    For chest pain place 1 tablet under the tongue every 5 minutes for 3 doses. If symptoms persist 5 minutes after 1st dose call 911.    Atypical chest pain       * order for DME     1 Units    Dispense one 4 wheeled walker with hand brakes and seat    Multiple myeloma not having achieved remission (H)       * order for DME     1 Units    Wheelchair.    Need for assistance due to unsteady gait       prochlorperazine 10 MG tablet    COMPAZINE    30 tablet    Take 1 tablet (10 mg) by mouth every 6 hours as needed (Nausea/Vomiting)    Multiple myeloma not having achieved remission (H), Fatigue, unspecified type       ZOMETA IV           * Notice:  This list has 2 medication(s) that are the same as other medications prescribed for you. Read the directions carefully, and ask your doctor or other care provider to review them with you.

## 2018-10-15 NOTE — MR AVS SNAPSHOT
After Visit Summary   10/15/2018    Quiana Dunaway    MRN: [id number]           Patient Information     Date Of Birth          1935        Visit Information        Provider Department      10/15/2018 2:40 PM Alex Parry MD [facility]        Care Instructions    Make sure that myeloma labs are drawn.  Continue revlimid and dexamethasone.  Resume zometa next month.  Follow up in 4-6 weeks.    The patient is in [facility]          Follow-ups after your visit        Your next 10 appointments already scheduled     Oct 16, 2018  1:45 PM CDT   Pacemaker Check with GABRIEL HUNTERN   [facility] ([facility])    [address]  [place] [address]   [phone] OPT 2            Oct 29, 2018  2:00 PM CDT   Return Visit with  Oncology Nurse   [facility] ([facility])    [facility]  [address]  [place] [address]   [phone]            Nov 14, 2018 10:00 AM CST   Level 1 with  INFUSION CHAIR 13   [facility] ([facility])    [facility]  [address]  [place] [address]   [phone]            Nov 14, 2018 10:40 AM CST   Return Visit with Alex Parry MD   [facility] ([facility])    [facility]  [address]  [place] [address]   [phone]            Dec 12, 2018  1:30 PM CST   Office Visit with César Chung MD   [facility]    [address] [facility] [place] [address]   [phone]           Bring a current list of meds and any records pertaining to this visit. For Physicals, please bring immunization records and any forms needing to be filled out. Please arrive 10 minutes early to complete paperwork.              Future tests that were ordered for you today     Open  Standing Orders        Priority Remaining Interval Expires Ordered    IgG Routine 1/1 AM DRAW  10/15/2018    Protein electrophoresis Routine 1/1 AM DRAW  10/15/2018    Kappa and lambda light chain Routine 1/1 AM DRAW  10/15/2018            Who to contact     If you have questions or need follow up information about today's clinic visit or your schedule please contact [facility] directly at [phone].  Normal or non-critical lab and imaging results will be communicated to you by Dennoohart, letter or phone within 4 business days after the clinic has received the results. If you do not hear from us within 7 days, please contact the clinic through [employer] or phone. If you have a critical or abnormal lab result, we will notify you by phone as soon as possible.  Submit refill requests through [employer] or call your pharmacy and they will forward the refill request to us. Please allow 3 business days for your refill to be completed.          Additional Information About Your Visit        DennooharWeBe Works Information     [employer] gives you secure access to your electronic health record. If you see a primary care provider, you can also send messages to your care team and make appointments. If you have questions, please call your primary care clinic.  If you do not have a primary care provider, please call [phone] and they will assist you.        Care EveryWhere ID     This is your Care EveryWhere ID. This could be used by other organizations to access your [facility] medical records  [id number]        Your Vitals Were     Pulse Temperature Respirations Pulse Oximetry          75 98.3  F (36.8  C) (Oral) 16 94%         Blood Pressure from Last 3 Encounters:   10/15/18 119/73   10/15/18 119/73   10/08/18 119/58    Weight from Last 3 Encounters:   10/08/18 70.3 kg (155 lb)   08/13/18 70.7 kg (155 lb 12.8 oz)   08/06/18 68 kg (150 lb)              Today, you had the following     No orders found for display       Primary  Care Provider Office Phone # Fax #    César Chung [phone] [address] [address]        Equal Access to Services     PAMELLAKEVON LORI : Hadii aad ku hadivelisseo Soterrieali, waaxda luqadaha, qaybta kaalmada adedenzelyada, [place] lechugafrancisco christiana. So [facility] [phone].    ATENCIÓN: Si habla español, tiene a contreras disposición servicios gratuitos de asistencia lingüística. Llame al [phone].    We comply with applicable federal civil rights laws and [place] laws. We do not discriminate on the basis of race, color, national origin, age, disability, sex, sexual orientation, or gender identity.            Thank you!     Thank you for choosing [facility]  for your care. Our goal is always to provide you with excellent care. Hearing back from our patients is one way we can continue to improve our services. Please take a few minutes to complete the written survey that you may receive in the mail after your visit with us. Thank you!             Your Updated Medication List - Protect others around you: Learn how to safely use, store and throw away your medicines at www.disposemymeds.org.          This list is accurate as of 10/15/18  3:21 PM.  Always use your most recent med list.                   Brand Name Dispense Instructions for use Diagnosis    acyclovir 400 MG tablet    ZOVIRAX    60 tablet    Take 1 tablet (400 mg) by mouth 2 times daily Viral Prophylaxis.    Multiple myeloma not having achieved remission (H)       aspirin 81 MG chewable tablet      Take 81 mg by mouth daily        dexamethasone 4 MG tablet    DECADRON    20 tablet    Take 5 tablets (20 mg) by mouth once a week Once a week with food on Days 1,8,15.    Multiple myeloma not having achieved remission (H)       escitalopram 5 MG tablet    LEXAPRO    90 tablet    TAKE 1 TABLET BY MOUTH DAILY    ROBER (generalized anxiety disorder)       IMODIUM A-D PO      Take by mouth as needed         LENalidomide 10 MG Caps capsule CHEMO    REVLIMID    14 capsule    Take 1 capsule (10 mg) by mouth daily for 14 days Take on Days 1 through 14 of 21-day cycle.    Multiple myeloma not having achieved remission (H)       lisinopril 10 MG tablet    PRINIVIL/ZESTRIL    90 tablet    Take 1 tablet (10 mg) by mouth daily    Benign essential hypertension       LORazepam 0.5 MG tablet    ATIVAN    30 tablet    Take 1 tablet (0.5 mg) by mouth every 8 hours as needed (Anxiety, Nausea/Vomiting or Sleep)    Multiple myeloma not having achieved remission (H)       MYRBETRIQ PO      Take 25 mg by mouth daily        nitroGLYcerin 0.4 MG sublingual tablet    NITROSTAT    25 tablet    For chest pain place 1 tablet under the tongue every 5 minutes for 3 doses. If symptoms persist 5 minutes after 1st dose call 911.    Atypical chest pain       * order for DME     1 Units    Dispense one 4 wheeled walker with hand brakes and seat    Multiple myeloma not having achieved remission (H)       * order for DME     1 Units    Wheelchair.    Need for assistance due to unsteady gait       prochlorperazine 10 MG tablet    COMPAZINE    30 tablet    Take 1 tablet (10 mg) by mouth every 6 hours as needed (Nausea/Vomiting)    Multiple myeloma not having achieved remission (H), Fatigue, unspecified type       ZOMETA IV           * Notice:  This list has 2 medication(s) that are the same as other medications prescribed for you. Read the directions carefully, and ask your doctor or other care provider to review them with you.

## 2018-10-15 NOTE — PATIENT INSTRUCTIONS
Make sure that myeloma labs are drawn.  Continue revlimid and dexamethasone.  Scheduled/kelly   Resume zometa next month.  Scheduled/kelly   Follow up in 4-6 weeks.  Scheduled/kelly     The patient is in [place]- [place]      AVS printed & given to patient/kelyl

## 2018-10-15 NOTE — PROGRESS NOTES
Oral Chemotherapy Monitoring Program.    Patient currently on revlimid therapy.    Reviewed labs from 10/15/18. Results are concerning for low potassium. OK per Dr. Parry to replace orally per protocol.    Met with Quiana and her daughter in law when they were in infusion today. Confirmed start date on 10/8/18. Next cycle will start on 10/29. Due to a ride issue, patient will not be able to come in for labs in her off week, but will come in on the 29th to have them drawn. They are aware of the plan.    Patient and family will be out of town from 10/24-10/26 (am). Medication will need to be delivered either the 23rd or the 26th in the PM. Quiana is aware that she needs to answer when they call her so they can set up delivery.    Questions answered to patient's satisfaction.    Will follow up in 2 weeks.    Debra Matos PharmD  October 15, 2018

## 2018-10-15 NOTE — PROGRESS NOTES
Infusion Nursing Note:  Quiana Dunaway presents today for Labs/Zometa.    Patient seen by provider today: Yes: Dr. Parry   present during visit today: Not Applicable.    Note: Zometa held today due to dental issues per Dr. Parry.  40meq Potassium given PO for K 3.1.    Intravenous Access:  Labs drawn without difficulty.  Peripheral IV placed.    Treatment Conditions:  Lab Results   Component Value Date    HGB 12.2 10/15/2018     Lab Results   Component Value Date    WBC 6.5 10/15/2018      Lab Results   Component Value Date    ANEU 4.2 10/15/2018     Lab Results   Component Value Date     10/15/2018      Lab Results   Component Value Date     10/15/2018                   Lab Results   Component Value Date    POTASSIUM 3.1 10/15/2018           No results found for: MAG         Lab Results   Component Value Date    CR 0.91 10/15/2018                   Lab Results   Component Value Date    HUMBERTO 8.6 10/15/2018                Lab Results   Component Value Date    BILITOTAL 0.5 10/15/2018           Lab Results   Component Value Date    ALBUMIN 3.4 10/15/2018                    Lab Results   Component Value Date    ALT 18 10/15/2018           Lab Results   Component Value Date    AST 12 10/15/2018           Post Infusion Assessment:  Site patent and intact, free from redness, edema or discomfort.  No evidence of extravasations.  Access discontinued per protocol.    Discharge Plan:   Discharge instructions reviewed with: Patient and Family.  Patient and/or family verbalized understanding of discharge instructions and all questions answered.  Copy of AVS reviewed with patient and/or family.  Patient will return 10/29/18 for next appointment.  Patient discharged in stable condition accompanied by: daughter-in-law.  Departure Mode: Ambulatory.    Wes New RN

## 2018-10-16 ENCOUNTER — ALLIED HEALTH/NURSE VISIT (OUTPATIENT)
Dept: CARDIOLOGY | Facility: CLINIC | Age: 83
End: 2018-10-16
Payer: MEDICARE

## 2018-10-16 ENCOUNTER — DOCUMENTATION ONLY (OUTPATIENT)
Dept: CARDIOLOGY | Facility: CLINIC | Age: 83
End: 2018-10-16

## 2018-10-16 DIAGNOSIS — Z95.0 CARDIAC PACEMAKER IN SITU: Primary | ICD-10-CM

## 2018-10-16 DIAGNOSIS — I49.5 SINOATRIAL NODE DYSFUNCTION (H): ICD-10-CM

## 2018-10-16 LAB
ALBUMIN SERPL ELPH-MCNC: 3.5 G/DL (ref 3.7–5.1)
ALPHA1 GLOB SERPL ELPH-MCNC: 0.3 G/DL (ref 0.2–0.4)
ALPHA2 GLOB SERPL ELPH-MCNC: 0.7 G/DL (ref 0.5–0.9)
B-GLOBULIN SERPL ELPH-MCNC: 0.6 G/DL (ref 0.6–1)
GAMMA GLOB SERPL ELPH-MCNC: 0.6 G/DL (ref 0.7–1.6)
IGG SERPL-MCNC: 621 MG/DL (ref 695–1620)
KAPPA LC UR-MCNC: 3.5 MG/DL (ref 0.33–1.94)
KAPPA LC/LAMBDA SER: 4.73 {RATIO} (ref 0.26–1.65)
LAMBDA LC SERPL-MCNC: 0.74 MG/DL (ref 0.57–2.63)
M PROTEIN SERPL ELPH-MCNC: 0.4 G/DL
PROT PATTERN SERPL ELPH-IMP: ABNORMAL

## 2018-10-16 PROCEDURE — 93280 PM DEVICE PROGR EVAL DUAL: CPT | Performed by: INTERNAL MEDICINE

## 2018-10-16 NOTE — PROGRESS NOTES
Medtronic Advisa (D) Pacemaker Device Check    AP: 63 % : 5 %  Mode: DDDR         Underlying Rhythm: SR 70s  Heart Rate: excellent variability   Sensing: WNL    Pacing Threshold: WNL   Impedance: WNL  Battery Status: estimated 8 years longevity   Device Site: remains well healed   Atrial Arrhythmia: 8 AT/AF - available EGMs show As>Vs, with a 2:1 conduction. Longest episode lasting 1 hour 15 minutes. Total burden <0.1%. Only on aspirin   Ventricular Arrhythmia: 2 NSVT - egms show PAT  Setting Change: no changes made today     Care Plan: Remote on 1/23, last saw Ander in April, orders placed for next year. Did route this to Ander as the episode of AF/AFL is the longest shes had. SK

## 2018-10-16 NOTE — PROGRESS NOTES
Visit Date:   10/15/2018     HEMATOLOGY HISTORY: Mrs. Dunaway is a lady with multiple myeloma (IgG kappa). High risk, t(14;16).  1. On 05/01/2017, WBC of 3.4, hemoglobin of 10.2 and platelet of 154.  2. SPEP on 07/07/2017 revealed M-spike of 1.8.  3. Bone marrow biopsy on 07/12/2017 reveals kappa monotypic plasma cell population consistent with multiple myeloma.  Bone marrow is 70% cellular.  Plasma cell is 50-60%.     -There is gain of chromosome 1, 5, 9, 15, and 17.  There is translocation 14;16.   4.  On 07/18/2017:  -M-spike of 1.9.   -Immunofixation reveals monoclonal IgG kappa.    -IgG level of 2970.  IgA of 15 and IgM of 175.    -Kappa free light chain of 67.7.  Lambda free light chain of 1.42.  Ratio of kappa to lambda of 47.71.    -Beta 2 microglobulin of 3.4.   5. PET scan on 07/24/2017 reveals several focal areas of increased FDG uptake consistent with multiple myeloma.  There is probable epithelial cyst in tail of the pancreas.  No associated abnormal FDG activity.  Left thyroid cysts or nodules without any FDG activity.  There is a 0.3 cm left upper lobe lung nodule.   8.  Velcade, Revlimid and dexamethasone between on 08/14/2017 and 04/30/2018. Velcade stopped.   -Revlimid and dexamethasone continued.     SUBJECTIVE:   Ms. Dunaway is an 83-year-old female with IgG kappa multiple myeloma.  She is currently on Revlimid and dexamethasone.  She is doing well.  She did not take Revlimid in the month of September.  She restarted her Revlimid on 10/07/2018.  She is tolerating it well.  She continues on weekly dexamethasone.      Overall, she is doing good.  Her fatigue is a little less.  No headache.  No dizziness.  No chest pain.  No difficulty breathing.  No abdominal pain, nausea or vomiting.  Appetite is good.  No urinary or bowel complaints.  No bleeding.  No fever, chills or night sweats.      Patient recently had some dental workup done.  She is scheduled for [facility] today.  Dental work was 2 weeks  ago.  We will delay Zometa for a month.      PHYSICAL EXAMINATION:    Alert and oriented × 3.   Vitals: Reviewed. ECOG PS of 1.  Eyes: No icterus.    Throat: No ulcer.    Neck: No lymphadenopathy.  Nontender.  Axilla: No lymphadenopathy.  Lungs: Good air entry bilaterally.  Heart: Regular.  Abdomen: Soft.  Nondistended.  Nontender.  No mass felt.  Extremities: Mild edema.  Skin: No petechiae.      LABORATORY DATA:  Reviewed.      ASSESSMENT:   1.  An 83-year-old female with IgG kappa multiple myeloma.   2.  Fatigue which is slightly better.  Improvement in fatigue is due to the fact that she was not taking Revlimid for about a month.   3.  Mild hypokalemia.      PLAN:   1.  Discussed with her regarding multiple myeloma. Patient clinically is stable.  She will continue on Revlimid and dexamethasone.  She is tolerating it well.  Side effects reviewed. Free light chain from today is pending.  We will inform her of the result.    2.  As patient had dental workup done 2 weeks ago, we will delay Zometa for about a month.     3.  Patient has mild hypokalemia.  Oral potassium replacement will be given.     4.  Patient had multiple questions, which were all answered.  I will see her in 4-6 weeks' time.  I advised her to see a physician sooner if she has any fever, chills, worsening weakness, shortness of breath, recurrent vomiting, bleeding or any other concerns.     ADDENDUM: M-spike and kappa free light chain mildly higher. This is due to the fact that she did not take revlimid in September. Will continue the same treatment.        YVES PEREZ MD             D: 10/15/2018   T: 10/16/2018   MT: LUIS      Name:     DUGLAS CASTILLO   MRN:      [id number]        Account:      [id number]   :      1935           Visit Date:   10/15/2018      Document: [id number]

## 2018-10-16 NOTE — MR AVS SNAPSHOT
After Visit Summary   10/16/2018    Quiana Dunaway    MRN: [id number]           Patient Information     Date Of Birth          1935        Visit Information        Provider Department      10/16/2018 1:45 PM RIOS DCRN [facility]        Today's Diagnoses     Cardiac pacemaker in situ    -  1    Sinoatrial node dysfunction (H)           Follow-ups after your visit        Your next 10 appointments already scheduled     Oct 29, 2018  2:00 PM CDT   Return Visit with  Oncology Nurse   [facility] ([facility]    [facility]  [address]  [facility] [address]   [phone]            Nov 14, 2018 10:00 AM CST   Level 1 with  INFUSION CHAIR 13   [facility] ([facility])    [facility]  [address]  [place] [address]   [phone]            Nov 14, 2018 10:40 AM CST   Return Visit with Alex Parry MD   [facility] ([facility])    [facility]  [address]  [place] [address]   [phone]            Dec 12, 2018  1:30 PM CST   [facility]    [address] [facility] [address] [phone]           Bring a current list of meds and any records pertaining to this visit. For Physicals, please bring immunization records and any forms needing to be filled out. Please arrive 10 minutes early to complete paperwork.            Jan 23, 2019  3:15 PM CST   Remote PPM Check with RIOS TECH1   [facility] ([facility])    [address]  [facility] [address] [phone] OPT 2           This appointment is for a remote check of your pacemaker.  This is not an appointment at the office.              Who to contact     If  you have questions or need follow up information about today's clinic visit or your schedule please contact [facility]   [place] directly at [phone].  Normal or non-critical lab and imaging results will be communicated to you by MyChart, letter or phone within 4 business days after the clinic has received the results. If you do not hear from us within 7 days, please contact the clinic through [employer] or phone. If you have a critical or abnormal lab result, we will notify you by phone as soon as possible.  Submit refill requests through [employer] or call your pharmacy and they will forward the refill request to us. Please allow 3 business days for your refill to be completed.          Additional Information About Your Visit        [employer] Information     [employer] gives you secure access to your electronic health record. If you see a primary care provider, you can also send messages to your care team and make appointments. If you have questions, please call your primary care clinic.  If you do not have a primary care provider, please call [phone] and they will assist you.        Care EveryWhere ID     This is your Care EveryWhere ID. This could be used by other organizations to access your [employer] medical records  [id number]         Blood Pressure from Last 3 Encounters:   10/15/18 119/73   10/15/18 119/73   10/08/18 119/58    Weight from Last 3 Encounters:   10/08/18 70.3 kg (155 lb)   08/13/18 70.7 kg (155 lb 12.8 oz)   08/06/18 68 kg (150 lb)              We Performed the Following     PM DEVICE PROGRAMMING EVAL, DUAL LEAD PACER ([id number])        Primary Care Provider Office Phone # Fax #    César Chung [phone] [address]  [place] [address]        Equal Access to Services     LILLIANA SANDOVAL : Stacie Contreras, chance escobar, nelda pineda, phoenix villeda. So [facility]  [phone].    ATENCIÓN: Si ankur nascimento, tiene a contreras disposición servicios gratuitos de asistencia lingüística. Chad gallardo [phone].    We comply with applicable federal civil rights laws and [place] laws. We do not discriminate on the basis of race, color, national origin, age, disability, sex, sexual orientation, or gender identity.            Thank you!     Thank you for choosing [facility]  for your care. Our goal is always to provide you with excellent care. Hearing back from our patients is one way we can continue to improve our services. Please take a few minutes to complete the written survey that you may receive in the mail after your visit with us. Thank you!             Your Updated Medication List - Protect others around you: Learn how to safely use, store and throw away your medicines at www.disposemymeds.org.          This list is accurate as of 10/16/18  2:02 PM.  Always use your most recent med list.                   Brand Name Dispense Instructions for use Diagnosis    acyclovir 400 MG tablet    ZOVIRAX    60 tablet    Take 1 tablet (400 mg) by mouth 2 times daily Viral Prophylaxis.    Multiple myeloma not having achieved remission (H)       aspirin 81 MG chewable tablet      Take 81 mg by mouth daily        dexamethasone 4 MG tablet    DECADRON    20 tablet    Take 5 tablets (20 mg) by mouth once a week Once a week with food on Days 1,8,15.    Multiple myeloma not having achieved remission (H)       escitalopram 5 MG tablet    LEXAPRO    90 tablet    TAKE 1 TABLET BY MOUTH DAILY    ROBER (generalized anxiety disorder)       IMODIUM A-D PO      Take by mouth as needed        LENalidomide 10 MG Caps capsule CHEMO    REVLIMID    14 capsule    Take 1 capsule (10 mg) by mouth daily for 14 days Take on Days 1 through 14 of 21-day cycle.    Multiple myeloma not having achieved remission (H)       lisinopril 10 MG tablet    PRINIVIL/ZESTRIL    90 tablet    Take  1 tablet (10 mg) by mouth daily    Benign essential hypertension       LORazepam 0.5 MG tablet    ATIVAN    30 tablet    Take 1 tablet (0.5 mg) by mouth every 8 hours as needed (Anxiety, Nausea/Vomiting or Sleep)    Multiple myeloma not having achieved remission (H)       MYRBETRIQ PO      Take 25 mg by mouth daily        nitroGLYcerin 0.4 MG sublingual tablet    NITROSTAT    25 tablet    For chest pain place 1 tablet under the tongue every 5 minutes for 3 doses. If symptoms persist 5 minutes after 1st dose call 911.    Atypical chest pain       * order for DME     1 Units    Dispense one 4 wheeled walker with hand brakes and seat    Multiple myeloma not having achieved remission (H)       * order for DME     1 Units    Wheelchair.    Need for assistance due to unsteady gait       prochlorperazine 10 MG tablet    COMPAZINE    30 tablet    Take 1 tablet (10 mg) by mouth every 6 hours as needed (Nausea/Vomiting)    Multiple myeloma not having achieved remission (H), Fatigue, unspecified type       ZOMETA IV           * Notice:  This list has 2 medication(s) that are the same as other medications prescribed for you. Read the directions carefully, and ask your doctor or other care provider to review them with you.

## 2018-10-17 NOTE — PROGRESS NOTES
Katherin Dunaway,    Blood tests reveal slight increase in myeloma blood tests.  -Monoclonal peak is 0.4. It was 0.3 before.  -[facility] free light chain is 3.5. It was 2.61 before.    We will continue the same treatment.    Alex Parry

## 2018-10-19 RX ORDER — LENALIDOMIDE 10 MG/1
10 CAPSULE ORAL DAILY
Qty: 14 CAPSULE | Refills: 0 | Status: SHIPPED | OUTPATIENT
Start: 2018-10-19 | End: 2018-12-26

## 2018-10-19 RX ORDER — DEXAMETHASONE 4 MG/1
20 TABLET ORAL WEEKLY
Qty: 20 TABLET | Refills: 0 | Status: SHIPPED | OUTPATIENT
Start: 2018-10-19 | End: 2018-11-14

## 2018-10-29 ENCOUNTER — DOCUMENTATION ONLY (OUTPATIENT)
Dept: PHARMACY | Facility: CLINIC | Age: 83
End: 2018-10-29

## 2018-10-29 ENCOUNTER — HOSPITAL ENCOUNTER (OUTPATIENT)
Facility: CLINIC | Age: 83
Setting detail: SPECIMEN
Discharge: HOME | End: 2018-10-29
Attending: INTERNAL MEDICINE | Admitting: INTERNAL MEDICINE
Payer: MEDICARE

## 2018-10-29 ENCOUNTER — ONCOLOGY VISIT (OUTPATIENT)
Dept: ONCOLOGY | Facility: CLINIC | Age: 83
End: 2018-10-29
Attending: INTERNAL MEDICINE
Payer: MEDICARE

## 2018-10-29 DIAGNOSIS — C90.00 MULTIPLE MYELOMA NOT HAVING ACHIEVED REMISSION (H): ICD-10-CM

## 2018-10-29 LAB
ALBUMIN SERPL-MCNC: 3.3 G/DL (ref 3.4–5)
ALP SERPL-CCNC: 84 U/L (ref 40–150)
ALT SERPL W P-5'-P-CCNC: 19 U/L (ref 0–50)
ANION GAP SERPL CALCULATED.3IONS-SCNC: 4 MMOL/L (ref 3–14)
AST SERPL W P-5'-P-CCNC: 12 U/L (ref 0–45)
BASOPHILS # BLD AUTO: 0 10E9/L (ref 0–0.2)
BASOPHILS NFR BLD AUTO: 0.4 %
BILIRUB SERPL-MCNC: 0.4 MG/DL (ref 0.2–1.3)
BUN SERPL-MCNC: 20 MG/DL (ref 7–30)
CALCIUM SERPL-MCNC: 8.9 MG/DL (ref 8.5–10.1)
CHLORIDE SERPL-SCNC: 107 MMOL/L (ref 94–109)
CO2 SERPL-SCNC: 29 MMOL/L (ref 20–32)
CREAT SERPL-MCNC: 0.83 MG/DL (ref 0.52–1.04)
DIFFERENTIAL METHOD BLD: NORMAL
EOSINOPHIL # BLD AUTO: 0.1 10E9/L (ref 0–0.7)
EOSINOPHIL NFR BLD AUTO: 1.8 %
ERYTHROCYTE [DISTWIDTH] IN BLOOD BY AUTOMATED COUNT: 13.1 % (ref 10–15)
GFR SERPL CREATININE-BSD FRML MDRD: 66 ML/MIN/1.7M2
GLUCOSE SERPL-MCNC: 100 MG/DL (ref 70–99)
HCT VFR BLD AUTO: 38.6 % (ref 35–47)
HGB BLD-MCNC: 13 G/DL (ref 11.7–15.7)
IMM GRANULOCYTES # BLD: 0 10E9/L (ref 0–0.4)
IMM GRANULOCYTES NFR BLD: 0.6 %
LYMPHOCYTES # BLD AUTO: 1.3 10E9/L (ref 0.8–5.3)
LYMPHOCYTES NFR BLD AUTO: 25.1 %
MCH RBC QN AUTO: 30.6 PG (ref 26.5–33)
MCHC RBC AUTO-ENTMCNC: 33.7 G/DL (ref 31.5–36.5)
MCV RBC AUTO: 91 FL (ref 78–100)
MONOCYTES # BLD AUTO: 1 10E9/L (ref 0–1.3)
MONOCYTES NFR BLD AUTO: 19.8 %
NEUTROPHILS # BLD AUTO: 2.7 10E9/L (ref 1.6–8.3)
NEUTROPHILS NFR BLD AUTO: 52.3 %
NRBC # BLD AUTO: 0 10*3/UL
NRBC BLD AUTO-RTO: 0 /100
PLATELET # BLD AUTO: 172 10E9/L (ref 150–450)
POTASSIUM SERPL-SCNC: 3.5 MMOL/L (ref 3.4–5.3)
PROT SERPL-MCNC: 6.4 G/DL (ref 6.8–8.8)
RBC # BLD AUTO: 4.25 10E12/L (ref 3.8–5.2)
SODIUM SERPL-SCNC: 140 MMOL/L (ref 133–144)
WBC # BLD AUTO: 5.1 10E9/L (ref 4–11)

## 2018-10-29 PROCEDURE — 85025 COMPLETE CBC W/AUTO DIFF WBC: CPT | Performed by: INTERNAL MEDICINE

## 2018-10-29 PROCEDURE — 36415 COLL VENOUS BLD VENIPUNCTURE: CPT

## 2018-10-29 PROCEDURE — 80053 COMPREHEN METABOLIC PANEL: CPT | Performed by: INTERNAL MEDICINE

## 2018-10-29 NOTE — PROGRESS NOTES
Medical Assistant Note:  Quiana Dunaway presents today for lab only.    Patient seen by provider today: Yes.   present during visit today: Not Applicable.    Concerns: No Concerns.    Procedure:  Lab draw site: LAC, Needle type: BF, Gauge: 21.    Post Assessment:  Labs drawn without difficulty: Yes.    Discharge Plan:  Departure Mode: Ambulatory.    Face to Face Time: 5 minutes.    Bonny Sharif MA

## 2018-10-29 NOTE — PROGRESS NOTES
Oral Chemotherapy Monitoring Program.    Patient currently on revlimid therapy.    Reviewed labs from 10/29/18. No concerning abnormalities. Started new cycle today.    Questions answered to patient's satisfaction.    Will follow up in 3 weeks with repeat labs.    Debra Matos PharmD  October 29, 2018

## 2018-10-29 NOTE — LETTER
10/29/2018         RE: Quiana Dunaway  [address] [facility] [address]        Dear Colleague,    Thank you for referring your patient, Quiana Dunaway, to the [facility]. Please see a copy of my visit note below.    Medical Assistant Note:  Quiana Dunaway presents today for lab only.    Patient seen by provider today: Yes.   present during visit today: Not Applicable.    Concerns: No Concerns.    Procedure:  Lab draw site: LAC, Needle type: BF, Gauge: 21.    Post Assessment:  Labs drawn without difficulty: Yes.    Discharge Plan:  Departure Mode: Ambulatory.    Face to Face Time: 5 minutes.    Bonny Sharif MA              Again, thank you for allowing me to participate in the care of your patient.        Sincerely,        Oncology Nurse

## 2018-10-29 NOTE — MR AVS SNAPSHOT
After Visit Summary   10/29/2018    Quiana Dunaway    MRN: [id number]           Patient Information     Date Of Birth          1935        Visit Information        Provider Department      10/29/2018 2:00 PM Nurse,  Oncology [facility]        Today's Diagnoses     Multiple myeloma not having achieved remission (H)           Follow-ups after your visit        Your next 10 appointments already scheduled     Nov 14, 2018 10:00 AM CST   Level 1 with  INFUSION CHAIR 13   [facility] ([facility])    [facility]  [address]  [facility] [address]   [phone]            Nov 14, 2018 10:40 AM CST   Return Visit with Alex Parry MD   [facility] ([facility])    [facility]  [address]  [facility] [address] [phone]            Dec 12, 2018  1:30 PM CST   Office Visit with César [facility]ung MD   [facility]    [address] [facility] [address] [phone]           Bring a current list of meds and any records pertaining to this visit. For Physicals, please bring immunization records and any forms needing to be filled out. Please arrive 10 minutes early to complete paperwork.            Jan 23, 2019  3:15 PM CST   Remote PPM Check with RIOS TECH1   [facility] ([facility] [place] [facility])    [address]  [facility] [address] [phone] OPT 2           This appointment is for a remote check of your pacemaker.  This is not an appointment at the office.              Who to contact     If you have questions or need follow up information about today's clinic visit or your schedule please contact [facility] directly at [phone].  Normal or non-critical lab and imaging results will be communicated to you by MyChart, letter or phone  within 4 business days after the clinic has received the results. If you do not hear from us within 7 days, please contact the clinic through [employer] or phone. If you have a critical or abnormal lab result, we will notify you by phone as soon as possible.  Submit refill requests through [employer] or call your pharmacy and they will forward the refill request to us. Please allow 3 business days for your refill to be completed.          Additional Information About Your Visit        [employer] Information     [employer] gives you secure access to your electronic health record. If you see a primary care provider, you can also send messages to your care team and make appointments. If you have questions, please call your primary care clinic.  If you do not have a primary care provider, please call [phone] and they will assist you.        Care EveryWhere ID     This is your Care EveryWhere ID. This could be used by other organizations to access your [place] medical records  [id number]         Blood Pressure from Last 3 Encounters:   10/15/18 119/73   10/15/18 119/73   10/08/18 119/58    Weight from Last 3 Encounters:   10/08/18 70.3 kg (155 lb)   08/13/18 70.7 kg (155 lb 12.8 oz)   08/06/18 68 kg (150 lb)              We Performed the Following     **Comprehensive metabolic panel FUTURE anytime     CBC with platelets and differential        Primary Care Provider Office Phone # Fax #    César Chung [phone] [address]  [place] [address]        Equal Access to Services     [facility] : Hadii aad ku hadasho Soomaali, waaxda luqadaha, qaybta kaalmada adeegyabertha, phoenix rodriguez . So [facility] [phone].    ATENCIÓN: Si habla español, tiene a contreras disposición servicios gratuitos de asistencia lingüística. Llame al [phone].    We comply with applicable federal civil rights laws and [place] laws. We do not discriminate on the basis of race, color, national  origin, age, disability, sex, sexual orientation, or gender identity.            Thank you!     Thank you for choosing [facility]  for your care. Our goal is always to provide you with excellent care. Hearing back from our patients is one way we can continue to improve our services. Please take a few minutes to complete the written survey that you may receive in the mail after your visit with us. Thank you!             Your Updated Medication List - Protect others around you: Learn how to safely use, store and throw away your medicines at www.disposemymeds.org.          This list is accurate as of 10/29/18  2:35 PM.  Always use your most recent med list.                   Brand Name Dispense Instructions for use Diagnosis    acyclovir 400 MG tablet    ZOVIRAX    60 tablet    Take 1 tablet (400 mg) by mouth 2 times daily Viral Prophylaxis.    Multiple myeloma not having achieved remission (H)       aspirin 81 MG chewable tablet      Take 81 mg by mouth daily        * dexamethasone 4 MG tablet    DECADRON    20 tablet    Take 5 tablets (20 mg) by mouth once a week Once a week with food on Days 1,8,15.    Multiple myeloma not having achieved remission (H)       * dexamethasone 4 MG tablet    DECADRON    20 tablet    Take 5 tablets (20 mg) by mouth once a week Once a week with food on Days 1,8,15.    Multiple myeloma not having achieved remission (H)       escitalopram 5 MG tablet    LEXAPRO    90 tablet    TAKE 1 TABLET BY MOUTH DAILY    ROBER (generalized anxiety disorder)       IMODIUM A-D PO      Take by mouth as needed        LENalidomide 10 MG Caps capsule CHEMO    REVLIMID    14 capsule    Take 1 capsule (10 mg) by mouth daily for 14 days Take on Days 1 through 14 of 21-day cycle.    Multiple myeloma not having achieved remission (H)       lisinopril 10 MG tablet    PRINIVIL/ZESTRIL    90 tablet    Take 1 tablet (10 mg) by mouth daily    Benign essential hypertension       LORazepam 0.5 MG tablet     ATIVAN    30 tablet    Take 1 tablet (0.5 mg) by mouth every 8 hours as needed (Anxiety, Nausea/Vomiting or Sleep)    Multiple myeloma not having achieved remission (H)       MYRBETRIQ PO      Take 25 mg by mouth daily        nitroGLYcerin 0.4 MG sublingual tablet    NITROSTAT    25 tablet    For chest pain place 1 tablet under the tongue every 5 minutes for 3 doses. If symptoms persist 5 minutes after 1st dose call 911.    Atypical chest pain       * order for DME     1 Units    Dispense one 4 wheeled walker with hand brakes and seat    Multiple myeloma not having achieved remission (H)       * order for DME     1 Units    Wheelchair.    Need for assistance due to unsteady gait       prochlorperazine 10 MG tablet    COMPAZINE    30 tablet    Take 1 tablet (10 mg) by mouth every 6 hours as needed (Nausea/Vomiting)    Multiple myeloma not having achieved remission (H), Fatigue, unspecified type       ZOMETA IV           * Notice:  This list has 4 medication(s) that are the same as other medications prescribed for you. Read the directions carefully, and ask your doctor or other care provider to review them with you.

## 2018-11-07 RX ORDER — LENALIDOMIDE 10 MG/1
10 CAPSULE ORAL DAILY
Qty: 14 CAPSULE | Refills: 0 | OUTPATIENT
Start: 2018-11-07 | End: 2018-11-08

## 2018-11-07 RX ORDER — DEXAMETHASONE 4 MG/1
20 TABLET ORAL WEEKLY
Qty: 20 TABLET | Refills: 0 | Status: SHIPPED | OUTPATIENT
Start: 2018-11-07 | End: 2018-12-26

## 2018-11-08 RX ORDER — LENALIDOMIDE 10 MG/1
10 CAPSULE ORAL DAILY
Qty: 14 CAPSULE | Refills: 0 | Status: SHIPPED | OUTPATIENT
Start: 2018-11-08 | End: 2018-12-26

## 2018-11-11 ENCOUNTER — NURSE TRIAGE (OUTPATIENT)
Dept: NURSING | Facility: CLINIC | Age: 83
End: 2018-11-11

## 2018-11-11 NOTE — TELEPHONE ENCOUNTER
"Daughter in law calls to ask if there would be a contraindication between an eyedrop and the medication patient is currently on.  FNA advised to call back when she knows the medication name so FNA can check [employer] to see if there is a drug interaction.  Caller verbalizes understanding.          Additional Information    Negative: Drug overdose and nurse unable to answer question    Negative: Caller requesting information not related to medicine    Negative: Caller requesting a prescription for Strep throat and has a positive culture result    Negative: Rash while taking a medication or within 3 days of stopping it    Negative: Immunization reaction suspected    Negative: [1] Asthma and [2] having symptoms of asthma (cough, wheezing, etc)    Negative: MORE THAN A DOUBLE DOSE of a prescription or over-the-counter (OTC) drug    Negative: [1] DOUBLE DOSE (an extra dose or lesser amount) of over-the-counter (OTC) drug AND [2] any symptoms (e.g., dizziness, nausea, pain, sleepiness)    Negative: [1] DOUBLE DOSE (an extra dose or lesser amount) of prescription drug AND [2] any symptoms (e.g., dizziness, nausea, pain, sleepiness)    Negative: Took another person's prescription drug    Negative: [1] DOUBLE DOSE (an extra dose or lesser amount) of prescription drug AND [2] NO symptoms (Exception: a double dose of antibiotics)    Negative: Diabetes drug error or overdose (e.g., insulin or extra dose)    Negative: [1] Request for URGENT new prescription or refill of \"essential\" medication (i.e., likelihood of harm to patient if not taken) AND [2] triager unable to fill per unit policy    Negative: [1] Prescription not at pharmacy AND [2] was prescribed today by PCP    Negative: Pharmacy calling with prescription questions and triager unable to answer question    Negative: Caller has URGENT medication question about med that PCP prescribed and triager unable to answer question    Negative: Caller has NON-URGENT medication " question about med that PCP prescribed and triager unable to answer question    Negative: Caller requesting a NON-URGENT new prescription or refill and triager unable to refill per unit policy    Negative: Caller has medication question about med not prescribed by PCP and triager unable to answer question (e.g., compatibility with other med, storage)    Negative: [1] DOUBLE DOSE (an extra dose or lesser amount) of over-the-counter (OTC) drug AND [2] NO symptoms (all triage questions negative)    Negative: [1] DOUBLE DOSE (an extra dose or lesser amount) of antibiotic drug AND [2] NO symptoms (all triage questions negative)    Caller has medication question only, adult not sick, and triager answers question    Protocols used: [facility]

## 2018-11-11 NOTE — TELEPHONE ENCOUNTER
Daughter in law Regi Dunaway consent to communicate on file from 8/10/17. Caller is questioning drug interactions with new eye drops given today polymyxin-b trimethoprin. Reviewed per [employer]   Concurrent use of TRIMETHOPRIM and SELECTED POTASSIUM-SPARING DRUGS may result in increased risk of hyperkalemia.   Reviewed  to consult with pharmacist regarding drug interaction. Caller verbalized understanding. Denies further questions.    Mer Mosher RN  [facility]

## 2018-11-12 ENCOUNTER — TELEPHONE (OUTPATIENT)
Dept: FAMILY MEDICINE | Facility: CLINIC | Age: 83
End: 2018-11-12

## 2018-11-12 NOTE — TELEPHONE ENCOUNTER
Spoke to Daughter who has consent to communicate  Advised that hard to tell when drops will work, and hard to tell if sx bacterial/viral, and cannot see visit as it was out of Epic system. Advised continued drops, if worsening sx, be seen, otherwise can ask about this at Oncology visit wed.  Daughter in agreement with this plan. Arleth Pichardo RN

## 2018-11-12 NOTE — TELEPHONE ENCOUNTER
Reason for Call:  Other call back    Detailed comments: patient has pink eye started a med yesterday at noon with no relief woke up today with eye closed shut when does medicine start to work    Phone Number Patient can be reached at: Other phone number:  [phone]    Best Time: any    Can we leave a detailed message on this number? YES    Call taken on 11/12/2018 at 11:21 AM by Arleen Estrella

## 2018-11-14 ENCOUNTER — HOSPITAL ENCOUNTER (OUTPATIENT)
Facility: CLINIC | Age: 83
Setting detail: SPECIMEN
Discharge: HOME | End: 2018-11-14
Attending: INTERNAL MEDICINE | Admitting: INTERNAL MEDICINE
Payer: MEDICARE

## 2018-11-14 ENCOUNTER — DOCUMENTATION ONLY (OUTPATIENT)
Dept: PHARMACY | Facility: CLINIC | Age: 83
End: 2018-11-14

## 2018-11-14 ENCOUNTER — ONCOLOGY VISIT (OUTPATIENT)
Dept: ONCOLOGY | Facility: CLINIC | Age: 83
End: 2018-11-14
Attending: OBSTETRICS & GYNECOLOGY
Payer: MEDICARE

## 2018-11-14 DIAGNOSIS — H10.33 ACUTE BACTERIAL CONJUNCTIVITIS OF BOTH EYES: ICD-10-CM

## 2018-11-14 DIAGNOSIS — C90.00 MULTIPLE MYELOMA NOT HAVING ACHIEVED REMISSION (H): Primary | ICD-10-CM

## 2018-11-14 LAB
ALBUMIN SERPL-MCNC: 3.2 G/DL (ref 3.4–5)
ALP SERPL-CCNC: 77 U/L (ref 40–150)
ALT SERPL W P-5'-P-CCNC: 17 U/L (ref 0–50)
ANION GAP SERPL CALCULATED.3IONS-SCNC: 7 MMOL/L (ref 3–14)
AST SERPL W P-5'-P-CCNC: 8 U/L (ref 0–45)
BASOPHILS # BLD AUTO: 0 10E9/L (ref 0–0.2)
BASOPHILS NFR BLD AUTO: 0.3 %
BILIRUB SERPL-MCNC: 0.6 MG/DL (ref 0.2–1.3)
BUN SERPL-MCNC: 29 MG/DL (ref 7–30)
CALCIUM SERPL-MCNC: 8.5 MG/DL (ref 8.5–10.1)
CHLORIDE SERPL-SCNC: 106 MMOL/L (ref 94–109)
CO2 SERPL-SCNC: 28 MMOL/L (ref 20–32)
CREAT SERPL-MCNC: 0.64 MG/DL (ref 0.52–1.04)
DIFFERENTIAL METHOD BLD: NORMAL
EOSINOPHIL # BLD AUTO: 0.1 10E9/L (ref 0–0.7)
EOSINOPHIL NFR BLD AUTO: 1.3 %
ERYTHROCYTE [DISTWIDTH] IN BLOOD BY AUTOMATED COUNT: 13 % (ref 10–15)
GFR SERPL CREATININE-BSD FRML MDRD: 89 ML/MIN/1.7M2
GLUCOSE SERPL-MCNC: 75 MG/DL (ref 70–99)
HCT VFR BLD AUTO: 37 % (ref 35–47)
HGB BLD-MCNC: 12.3 G/DL (ref 11.7–15.7)
IMM GRANULOCYTES # BLD: 0 10E9/L (ref 0–0.4)
IMM GRANULOCYTES NFR BLD: 0.2 %
LYMPHOCYTES # BLD AUTO: 1.7 10E9/L (ref 0.8–5.3)
LYMPHOCYTES NFR BLD AUTO: 27.3 %
MCH RBC QN AUTO: 30.1 PG (ref 26.5–33)
MCHC RBC AUTO-ENTMCNC: 33.2 G/DL (ref 31.5–36.5)
MCV RBC AUTO: 91 FL (ref 78–100)
MONOCYTES # BLD AUTO: 1 10E9/L (ref 0–1.3)
MONOCYTES NFR BLD AUTO: 15.5 %
NEUTROPHILS # BLD AUTO: 3.5 10E9/L (ref 1.6–8.3)
NEUTROPHILS NFR BLD AUTO: 55.4 %
PLATELET # BLD AUTO: 201 10E9/L (ref 150–450)
POTASSIUM SERPL-SCNC: 3.3 MMOL/L (ref 3.4–5.3)
PROT SERPL-MCNC: 6.7 G/DL (ref 6.8–8.8)
RBC # BLD AUTO: 4.09 10E12/L (ref 3.8–5.2)
SODIUM SERPL-SCNC: 141 MMOL/L (ref 133–144)
WBC # BLD AUTO: 6.2 10E9/L (ref 4–11)

## 2018-11-14 PROCEDURE — 85027 COMPLETE CBC AUTOMATED: CPT | Performed by: INTERNAL MEDICINE

## 2018-11-14 PROCEDURE — 83883 ASSAY NEPHELOMETRY NOT SPEC: CPT | Performed by: INTERNAL MEDICINE

## 2018-11-14 PROCEDURE — 99214 OFFICE O/P EST MOD 30 MIN: CPT | Performed by: INTERNAL MEDICINE

## 2018-11-14 PROCEDURE — G0463 HOSPITAL OUTPT CLINIC VISIT: HCPCS

## 2018-11-14 PROCEDURE — 82784 ASSAY IGA/IGD/IGG/IGM EACH: CPT | Performed by: INTERNAL MEDICINE

## 2018-11-14 PROCEDURE — 84165 PROTEIN E-PHORESIS SERUM: CPT | Performed by: INTERNAL MEDICINE

## 2018-11-14 PROCEDURE — 80053 COMPREHEN METABOLIC PANEL: CPT | Performed by: INTERNAL MEDICINE

## 2018-11-14 PROCEDURE — 85004 AUTOMATED DIFF WBC COUNT: CPT | Performed by: INTERNAL MEDICINE

## 2018-11-14 PROCEDURE — 00000402 ZZHCL STATISTIC TOTAL PROTEIN: Performed by: INTERNAL MEDICINE

## 2018-11-14 RX ORDER — POLYMYXIN B SULFATE AND TRIMETHOPRIM 1; 10000 MG/ML; [USP'U]/ML
1-2 SOLUTION OPHTHALMIC EVERY 6 HOURS
Qty: 1 BOTTLE | Refills: 0 | Status: SHIPPED | OUTPATIENT
Start: 2018-11-14 | End: 2018-12-26

## 2018-11-14 RX ORDER — ZOLEDRONIC ACID 0.04 MG/ML
4 INJECTION, SOLUTION INTRAVENOUS ONCE
Status: CANCELLED | OUTPATIENT
Start: 2018-11-20 | End: 2018-11-14

## 2018-11-14 RX ORDER — POLYMYXIN B SULFATE AND TRIMETHOPRIM 1; 10000 MG/ML; [USP'U]/ML
SOLUTION OPHTHALMIC
Refills: 0 | COMMUNITY
Start: 2018-11-11 | End: 2018-11-14

## 2018-11-14 ASSESSMENT — PAIN SCALES - GENERAL: PAINLEVEL: NO PAIN (0)

## 2018-11-14 NOTE — MR AVS SNAPSHOT
After Visit Summary   11/14/2018    Quiana Dunaway    MRN: [id number]           Patient Information     Date Of Birth          1935        Visit Information        Provider Department      11/14/2018 10:40 AM Alex Parry MD [facility]        Today's Diagnoses     Multiple myeloma not having achieved remission (H)    -  1    Acute bacterial conjunctivitis of both eyes          Care Instructions    Labs today.- Done LW  Continue eye-drops.  Delay zometa to next week.  Continue revlimid and dexamethasone.  See me in about 5 weeks with labs. (Labs as per treatment protocol)          Follow-ups after your visit        Your next 10 appointments already scheduled     Nov 20, 2018  2:00 PM CST   Level 1 with  INFUSION CHAIR 18   [facility] ([facility])    [facility]  [address]  [place] [address]   [phone]            Dec 03, 2018  2:00 PM CST   Return Visit with  Oncology Nurse   [facility] ([facility])    [facility]  [address]  [place] [address] [phone]            Dec 12, 2018  1:30 PM CST   Office Visit with César Chung MD   [facility]    [address] [facility] [address] [phone]           Bring a current list of meds and any records pertaining to this visit. For Physicals, please bring immunization records and any forms needing to be filled out. Please arrive 10 minutes early to complete paperwork.            Dec 26, 2018  2:00 PM CST   Level 1 with  INFUSION CHAIR 8   [facility] ([facility])    [facility]  [address]  [place] [address] [phone]            Dec 26, 2018  2:40 PM CST   Return Visit with Alex Parry MD   [facility] ([facility]  [facility])    [facility] [place]  [address]  [place] [address] [phone]            Jan 23, 2019  3:15 PM CST   Remote PPM Check with RIOS TECH1   [facility]   [place] ([facility])    [address]  [place] [id number] [phone] OPT 2           This appointment is for a remote check of your pacemaker.  This is not an appointment at the office.              Who to contact     If you have questions or need follow up information about today's clinic visit or your schedule please contact [facility] directly at [phone].  Normal or non-critical lab and imaging results will be communicated to you by [employer], letter or phone within 4 business days after the clinic has received the results. If you do not hear from us within 7 days, please contact the clinic through [employer] or phone. If you have a critical or abnormal lab result, we will notify you by phone as soon as possible.  Submit refill requests through [employer] or call your pharmacy and they will forward the refill request to us. Please allow 3 business days for your refill to be completed.          Additional Information About Your Visit        [employer] Information     [employer] gives you secure access to your electronic health record. If you see a primary care provider, you can also send messages to your care team and make appointments. If you have questions, please call your primary care clinic.  If you do not have a primary care provider, please call [phone] and they will assist you.        Care EveryWhere ID     This is your Care EveryWhere ID. This could be used by other organizations to access your [facility] medical records  [id number]         Blood Pressure from Last 3 Encounters:   11/14/18 (P) 125/77   10/15/18 119/73   10/15/18 119/73    Weight from Last 3 Encounters:   11/14/18 (P) 70.8 kg (156 lb)   10/08/18 70.3 kg (155 lb)   08/13/18 70.7 kg  (155 lb 12.8 oz)              We Performed the Following     CBC with platelets     Comprehensive metabolic panel     IgG     Kappa and lambda light chain     Protein electrophoresis          Today's Medication Changes          These changes are accurate as of 11/14/18 11:09 AM.  If you have any questions, ask your nurse or doctor.               These medicines have changed or have updated prescriptions.        Dose/Directions    trimethoprim-polymyxin b ophthalmic solution   Commonly known as:  POLYTRIM   This may have changed:    - how much to take  - how to take this  - when to take this   Used for:  Acute bacterial conjunctivitis of both eyes, Multiple myeloma not having achieved remission (H)   Changed by:  Alex Parry MD        Dose:  1-2 drop   Place 1-2 drops into both eyes every 6 hours   Quantity:  1 Bottle   Refills:  0            Where to get your medicines      These medications were sent to [facility] [place] - [place] - [address]  [address], [place] [address]     Phone:  [phone]     trimethoprim-polymyxin b ophthalmic solution                Primary Care Provider Office Phone # Fax #    César GISELE Chung [phone] [address]  [place] [address]        Equal Access to Services     [facility] AH: Hadii aad ku hadasho Soterrieali, waaxda luqadaha, qaybta kaalmada adeegyada, phoenix rodriguez . So [facility] [phone].    ATENCIÓN: Si habla español, tiene a contreras disposición servicios gratuitos de asistencia lingüística. [facility] [phone].    We comply with applicable federal civil rights laws and [place] laws. We do not discriminate on the basis of race, color, national origin, age, disability, sex, sexual orientation, or gender identity.            Thank you!     Thank you for choosing [facility]  for your care. Our goal is always to provide you with excellent care. Hearing back from our  patients is one way we can continue to improve our services. Please take a few minutes to complete the written survey that you may receive in the mail after your visit with us. Thank you!             Your Updated Medication List - Protect others around you: Learn how to safely use, store and throw away your medicines at www.disposemymeds.org.          This list is accurate as of 11/14/18 11:09 AM.  Always use your most recent med list.                   Brand Name Dispense Instructions for use Diagnosis    acyclovir 400 MG tablet    ZOVIRAX    60 tablet    Take 1 tablet (400 mg) by mouth 2 times daily Viral Prophylaxis.    Multiple myeloma not having achieved remission (H)       aspirin 81 MG chewable tablet      Take 81 mg by mouth daily        dexamethasone 4 MG tablet    DECADRON    20 tablet    Take 5 tablets (20 mg) by mouth once a week Once a week with food on Days 1,8,15.    Multiple myeloma not having achieved remission (H)       escitalopram 5 MG tablet    LEXAPRO    90 tablet    TAKE 1 TABLET BY MOUTH DAILY    ROBER (generalized anxiety disorder)       IMODIUM A-D PO      Take by mouth as needed        LENalidomide 10 MG Caps capsule CHEMO    REVLIMID    14 capsule    Take 1 capsule (10 mg) by mouth daily Take on Days 1 through 14 of 21-day cycle.    Multiple myeloma not having achieved remission (H)       lisinopril 10 MG tablet    PRINIVIL/ZESTRIL    90 tablet    Take 1 tablet (10 mg) by mouth daily    Benign essential hypertension       LORazepam 0.5 MG tablet    ATIVAN    30 tablet    Take 1 tablet (0.5 mg) by mouth every 8 hours as needed (Anxiety, Nausea/Vomiting or Sleep)    Multiple myeloma not having achieved remission (H)       MYRBETRIQ PO      Take 25 mg by mouth daily        nitroGLYcerin 0.4 MG sublingual tablet    NITROSTAT    25 tablet    For chest pain place 1 tablet under the tongue every 5 minutes for 3 doses. If symptoms persist 5 minutes after 1st dose call 911.    Atypical chest pain        * order for DME     1 Units    Dispense one 4 wheeled walker with hand brakes and seat    Multiple myeloma not having achieved remission (H)       * order for DME     1 Units    Wheelchair.    Need for assistance due to unsteady gait       prochlorperazine 10 MG tablet    COMPAZINE    30 tablet    Take 1 tablet (10 mg) by mouth every 6 hours as needed (Nausea/Vomiting)    Multiple myeloma not having achieved remission (H), Fatigue, unspecified type       trimethoprim-polymyxin b ophthalmic solution    POLYTRIM    1 Bottle    Place 1-2 drops into both eyes every 6 hours    Acute bacterial conjunctivitis of both eyes, Multiple myeloma not having achieved remission (H)       ZOMETA IV           * Notice:  This list has 2 medication(s) that are the same as other medications prescribed for you. Read the directions carefully, and ask your doctor or other care provider to review them with you.

## 2018-11-14 NOTE — PROGRESS NOTES
Oral Chemotherapy Monitoring Program.    Patient currently on Revlimid therapy.    Reviewed labs from 11/14/18. No concerning abnormalities.    Questions answered to patient's satisfaction.    Will follow up in 3 weeks with repeat labs.    Debra Matos PharmD  November 14, 2018

## 2018-11-14 NOTE — PROGRESS NOTES
Visit Date:   11/14/2018     HEMATOLOGY HISTORY: Mrs. Dunaway is a lady with multiple myeloma (IgG kappa). High risk, t(14;16).  1. On 05/01/2017, WBC of 3.4, hemoglobin of 10.2 and platelet of 154.  2. SPEP on 07/07/2017 revealed M-spike of 1.8.  3. Bone marrow biopsy on 07/12/2017 reveals kappa monotypic plasma cell population consistent with multiple myeloma.  Bone marrow is 70% cellular.  Plasma cell is 50-60%.     -There is gain of chromosome 1, 5, 9, 15, and 17.  There is translocation 14;16.   4.  On 07/18/2017:  -M-spike of 1.9.   -Immunofixation reveals monoclonal IgG kappa.    -IgG level of 2970.  IgA of 15 and IgM of 175.    -Kappa free light chain of 67.7.  Lambda free light chain of 1.42.  Ratio of kappa to lambda of 47.71.    -Beta 2 microglobulin of 3.4.   5. PET scan on 07/24/2017 reveals several focal areas of increased FDG uptake consistent with multiple myeloma.  There is probable epithelial cyst in tail of the pancreas.  No associated abnormal FDG activity.  Left thyroid cysts or nodules without any FDG activity.  There is a 0.3 cm left upper lobe lung nodule.   8.  Velcade, Revlimid and dexamethasone between on 08/14/2017 and 04/30/2018. Velcade stopped.   -Revlimid and dexamethasone continued.     SUBJECTIVE:  Ms. Dunaway is an 83-year-old female with high-risk IgG kappa multiple myeloma.  She was initially treated with Velcade, Revlimid and dexamethasone.  She is now on maintenance Revlimid and dexamethasone.  Overall, she has been tolerating it well.      Patient has bilateral conjunctivitis.  She got it from her grandkids.  She is using topical Polytrim ophthalmic solution.  She still has bilateral eye redness and some discharge.      No headache.  No dizziness.  No ear pain.  No sore throat.  Some facial congestion.  No chest pain.  No shortness of breath at rest.  She gets short of breath on exertion.  No nausea or vomiting.  Appetite has been decreased.  She has been more tired.  She  has been getting more thirsty.  No urinary frequency.  No bleeding.      PHYSICAL EXAMINATION:   GENERAL:  She is alert and oriented x 3.   VITAL SIGNS:  Reviewed.  ECOG PS of 2.   FACE:  No swelling.     EYES:  There is no icterus.  There is mild conjunctival congestion.  There is mild puffiness of the eyelid.   THROAT:  No ulcer.  Mouth is mildly dry.   NECK:  Supple. No lymphadenopathy.  No tenderness.   LUNGS:  Good air entry bilaterally.  No wheezing.   HEART:  Regular.   ABDOMEN:  Soft.  Nontender.  No mass.   EXTREMITIES:  No edema. No calf redness or tenderness.   SKIN:  No rash. No petechiae.      LABORATORY DATA:  Reviewed.      ASSESSMENT:   1.  An 83-year-old female with high-risk IgG kappa multiple myeloma.   2.  Bilateral conjunctivitis.   3.  Fatigue.   4.  Viral infection.      PLAN:   1.  I discussed with her regarding multiple myeloma.  Overall, it is stable.  She will continue on Revlimid and dexamethasone.  Overall, she has been tolerating it well.   2.  Patient has bilateral conjunctivitis.  She will continue on ophthalmic drop.  It should improve in next 5-7 days.   3.  Because of conjunctivitis, we are going to delay the Zometa to next week.  She is agreeable with it.   4.  Patient has fatigue.  She also gets some shortness of breath on exertion.  These are all related to her viral infection.  She has conjunctivitis which is most likely of viral origin, although bacterial conjunctivitis cannot be ruled out.  She also has some sinus congestion.  This all goes along with viral infection.  It should resolve in the next few days.  Advised her to take Tylenol, drink plenty of fluids and rest adequately.   5.  I will see her in about 5-6 weeks' time with labs.  Advised her to see a physician sooner if she has fever, chills, worsening weakness, worsening shortness of breath, recurrent vomiting, bleeding or any other concerns.         YVES PEREZ MD             D: 11/14/2018   T: 11/14/2018   MT:  MK      Name:     DUGLAS CASTILLO   MRN:      [id number]        Account:      [id number]   :      1935           Visit Date:   2018      Document: [id number]       Never smoker

## 2018-11-14 NOTE — Clinical Note
11/14/2018         RE: Quiana Dunaway  [address] [facility] [address]        Dear Colleague,    Thank you for referring your patient, Quiana Dunaway, to the [facility]. Please see a copy of my visit note below.    Visit Date:   11/14/2018      SUBJECTIVE:  Ms. Dunaway is an 83-year-old female with high-risk IgG kappa multiple myeloma.  She was initially treated with Velcade, Revlimid and dexamethasone.  She is now on maintenance Revlimid and dexamethasone.  Overall, she has been tolerating it well.      The patient has bilateral conjunctivitis.  The patient says that she got it from her grandkids.  She is using topical Polytrim ophthalmic solution.  She still has bilateral eye redness and some discharge.      No headache.  No dizziness.  No ear pain.  No sore throat.  Some facial congestion.  No chest pain.  No shortness of breath at rest.  She gets short of breath on exertion.  No nausea or vomiting.  Appetite has been decreased.  She has been more tired.  She has been getting more thirsty.  No urinary frequency.  No bleeding.      PHYSICAL EXAMINATION:   GENERAL:  She is alert, oriented x 3.   VITAL SIGNS:  Reviewed.  ECOG PS of 2.   FACE:  No swelling.     EYES:  There is no icterus.  There is mild conjunctival congestion.  There is mild puffiness of the eyelid.   THROAT:  No ulcer.  Mouth is mildly dry.   NECK:  Supple, no lymphadenopathy.  No tenderness.   LUNGS:  Good air entry bilaterally.  No wheezing.   HEART:  Regular.   ABDOMEN:  Soft.  Nontender.  No mass.   EXTREMITIES:  No edema, no calf redness or tenderness.   SKIN:  No rash, no petechiae.      LABORATORY DATA:  Reviewed.      ASSESSMENT:   1.  An 83-year-old female with high-risk IgA kappa multiple myeloma.   2.  Bilateral conjunctivitis.   3.  Fatigue.   4.  Viral infection.      PLAN:   1.  I discussed with her regarding multiple myeloma.  Overall, it is stable.  She will continue on Revlimid and dexamethasone.   Overall, she has been tolerating it well.   2.  The patient has bilateral conjunctivitis.  She will continue on ophthalmic drop.  It should improve in next 5-7 days.   3.  Because of conjunctivitis, we are going to delay the Zometa to next week.  She is agreeable with it.   4.  The patient has fatigue.  She also gets some shortness of breath on exertion.  These are all related to her viral infection.  She has conjunctivitis which is most likely of viral origin, although bacterial conjunctivitis cannot be ruled out.  She also has some sinus congestion.  This all goes along with viral infection.  It should resolve in the next few days.  Advised her to take Tylenol, drink plenty of fluids and rest adequately.   5.  I will see her in about 5-6 weeks' time with labs.  Advised her to see a physician sooner if she has fever, chills, worsening weakness, worsening shortness of breath, recurrent vomiting, bleeding or any other concerns.         YVES PEREZ MD             D: 2018   T: 2018   MT: HERMAN      Name:     DUGLAS DUNAWAY   MRN:      [id number]        Account:      [id number]   :      1935           Visit Date:   2018      Document: [id number]      Dear Ms. Dunaway,    Blood tests are overall good. Blood glucose is normal. No diabetes.    Potassium is mildly low. Please, eat more high potassium food like bananas.    Please, call me with any questions.    Yves Perez         Again, thank you for allowing me to participate in the care of your patient.        Sincerely,        Yves Perez MD

## 2018-11-14 NOTE — PATIENT INSTRUCTIONS
Labs today.- Done LW  Continue eye-drops.  Delay zometa to next week.  Scheduled/kelly   Continue revlimid and dexamethasone.  Scheduled/kelly   See me in about 5 weeks with labs. (Labs as per treatment protocol) scheduled/kelly       AVS printed & given to patient/kelly

## 2018-11-14 NOTE — NURSING NOTE
"Oncology Rooming Note    November 14, 2018 10:17 AM   Quiana Dunaway is a 83 year old female who presents for:    Chief Complaint   Patient presents with     Oncology Clinic Visit     follow up Multiple Myeloma     Initial Vitals: There were no vitals taken for this visit. Estimated body mass index is 28.35 kg/(m^2) as calculated from the following:    Height as of 10/8/18: 1.575 m (5' 2\").    Weight as of 10/8/18: 70.3 kg (155 lb). There is no height or weight on file to calculate BSA.  Data Unavailable Comment: Data Unavailable   No LMP recorded. Patient is postmenopausal.  Allergies reviewed: Yes  Medications reviewed: Yes    Medications: Medication refills not needed today.  Pharmacy name entered into [facility]:    [facility], [place] - [address] [facility] [address] [place] [address], [place] [address] [facility] [address]    Clinical concerns: Patient has notice urinary frequency, thirst, and is being treated for pinkeye nneds new eye gtts Dr. Parry was notified.    6 minutes for nursing intake (face to face time)     Alecia Briggs RN              "

## 2018-11-15 LAB
ALBUMIN SERPL ELPH-MCNC: 3.6 G/DL (ref 3.7–5.1)
ALPHA1 GLOB SERPL ELPH-MCNC: 0.4 G/DL (ref 0.2–0.4)
ALPHA2 GLOB SERPL ELPH-MCNC: 1 G/DL (ref 0.5–0.9)
B-GLOBULIN SERPL ELPH-MCNC: 0.7 G/DL (ref 0.6–1)
GAMMA GLOB SERPL ELPH-MCNC: 0.6 G/DL (ref 0.7–1.6)
IGG SERPL-MCNC: 634 MG/DL (ref 695–1620)
M PROTEIN SERPL ELPH-MCNC: 0.4 G/DL
PROT PATTERN SERPL ELPH-IMP: ABNORMAL

## 2018-11-15 NOTE — PROGRESS NOTES
Dear Ms. Dunaway,    Blood tests are overall good. Blood glucose is normal. No diabetes.    Potassium is mildly low. Please, eat more high potassium food like bananas.    Please, call me with any questions.    Alex Parry

## 2018-11-16 LAB
KAPPA LC UR-MCNC: 4.45 MG/DL (ref 0.33–1.94)
KAPPA LC/LAMBDA SER: 9.67 {RATIO} (ref 0.26–1.65)
LAMBDA LC SERPL-MCNC: 0.46 MG/DL (ref 0.57–2.63)

## 2018-11-20 ENCOUNTER — HOSPITAL ENCOUNTER (OUTPATIENT)
Facility: CLINIC | Age: 83
Setting detail: SPECIMEN
End: 2018-11-20
Attending: INTERNAL MEDICINE
Payer: MEDICARE

## 2018-11-20 ENCOUNTER — INFUSION THERAPY VISIT (OUTPATIENT)
Dept: INFUSION THERAPY | Facility: CLINIC | Age: 83
End: 2018-11-20
Attending: INTERNAL MEDICINE
Payer: MEDICARE

## 2018-11-20 VITALS
TEMPERATURE: 98.4 F | DIASTOLIC BLOOD PRESSURE: 74 MMHG | HEART RATE: 90 BPM | SYSTOLIC BLOOD PRESSURE: 115 MMHG | RESPIRATION RATE: 16 BRPM

## 2018-11-20 DIAGNOSIS — C90.00 MULTIPLE MYELOMA NOT HAVING ACHIEVED REMISSION (H): Primary | ICD-10-CM

## 2018-11-20 PROCEDURE — 25000128 H RX IP 250 OP 636: Performed by: INTERNAL MEDICINE

## 2018-11-20 PROCEDURE — 96374 THER/PROPH/DIAG INJ IV PUSH: CPT

## 2018-11-20 RX ORDER — ZOLEDRONIC ACID 0.04 MG/ML
4 INJECTION, SOLUTION INTRAVENOUS ONCE
Status: COMPLETED | OUTPATIENT
Start: 2018-11-20 | End: 2018-11-20

## 2018-11-20 RX ADMIN — ZOLEDRONIC ACID 4 MG: 0.04 INJECTION, SOLUTION INTRAVENOUS at 14:20

## 2018-11-20 ASSESSMENT — PAIN SCALES - GENERAL: PAINLEVEL: NO PAIN (0)

## 2018-11-20 NOTE — PROGRESS NOTES
Infusion Nursing Note:  Quiana Dunaway presents today for zometa.    Patient seen by provider today: No   present during visit today: Not Applicable.    Note: N/A.    Intravenous Access:  Peripheral IV placed.    Treatment Conditions:  Lab Results   Component Value Date     11/14/2018                   Lab Results   Component Value Date    POTASSIUM 3.3 11/14/2018           No results found for: MAG         Lab Results   Component Value Date    CR 0.64 11/14/2018                   Lab Results   Component Value Date    HUMBERTO 8.5 11/14/2018                Lab Results   Component Value Date    BILITOTAL 0.6 11/14/2018           Lab Results   Component Value Date    ALBUMIN 3.2 11/14/2018                    Lab Results   Component Value Date    ALT 17 11/14/2018           Lab Results   Component Value Date    AST 8 11/14/2018       Results reviewed, labs MET treatment parameters, ok to proceed with treatment.      Post Infusion Assessment:  Patient tolerated infusion without incident.  Site patent and intact, free from redness, edema or discomfort.  No evidence of extravasations.  Access discontinued per protocol.    Discharge Plan:   Patient and/or family verbalized understanding of discharge instructions and all questions answered.  AVS to patient via [employer].  Patient will return 12/3 for next appointment.   Patient discharged in stable condition accompanied by: daughter.  Departure Mode: Ambulatory.    Mer Pabon RN

## 2018-11-20 NOTE — MR AVS SNAPSHOT
After Visit Summary   11/20/2018    Quiana Dunaway    MRN: [id number]           Patient Information     Date Of Birth          1935        Visit Information        Provider Department      11/20/2018 2:00 PM  INFUSION CHAIR 18 [facility]        Today's Diagnoses     Multiple myeloma not having achieved remission (H)    -  1       Follow-ups after your visit        Your next 10 appointments already scheduled     Dec 03, 2018  2:00 PM CST   Return Visit with  Oncology Nurse   [facility] ([facility])    [facility]  [address]  [facility] [address]   [phone]            Dec 12, 2018  1:30 PM CST   Office Visit with César Chung MD   [facility]    [address] [place] [facility] [address] [phone]           Bring a current list of meds and any records pertaining to this visit. For Physicals, please bring immunization records and any forms needing to be filled out. Please arrive 10 minutes early to complete paperwork.            Dec 26, 2018  2:00 PM CST   Level 1 with  INFUSION CHAIR 8   [facility] ([facility])    [facility]  [address]  [facility] [address] [phone]            Dec 26, 2018  2:40 PM CST   Return Visit with Alex Parry MD   [facility] ([facility])    [facility]  [address]  [facility] [address] [phone]            Jan 23, 2019  3:15 PM CST   Remote PPM Check with RIOS TECH1   [facility]   [place] [facility])    [address]  [facility] [address] [phone] OPT 2           This appointment is for a remote check of your pacemaker.  This is not an appointment at the office.              Who to contact     If you have  questions or need follow up information about today's clinic visit or your schedule please contact [facility] directly at [phone].  Normal or non-critical lab and imaging results will be communicated to you by MyChart, letter or phone within 4 business days after the clinic has received the results. If you do not hear from us within 7 days, please contact the clinic through [employer] or phone. If you have a critical or abnormal lab result, we will notify you by phone as soon as possible.  Submit refill requests through [employer] or call your pharmacy and they will forward the refill request to us. Please allow 3 business days for your refill to be completed.          Additional Information About Your Visit        [employer] Information     [employer] gives you secure access to your electronic health record. If you see a primary care provider, you can also send messages to your care team and make appointments. If you have questions, please call your primary care clinic.  If you do not have a primary care provider, please call [phone] and they will assist you.        Care EveryWhere ID     This is your Care EveryWhere ID. This could be used by other organizations to access your [place] medical records  [id number]        Your Vitals Were     Pulse Temperature Respirations             90 98.4  F (36.9  C) (Oral) 16          Blood Pressure from Last 3 Encounters:   11/20/18 115/74   11/14/18 (P) 125/77   10/15/18 119/73    Weight from Last 3 Encounters:   11/14/18 (P) 70.8 kg (156 lb)   10/08/18 70.3 kg (155 lb)   08/13/18 70.7 kg (155 lb 12.8 oz)              Today, you had the following     No orders found for display       Primary Care Provider Office Phone # Fax #    César Chung [phone] [address]  [facility] [address]        Equal Access to Services     ILLLIANA SANDOVAL : Stacie Contreras, waaxda luqnatalie, qaybta kashayan pineda,  phoenix careydenzel pryor'aan ah. So [facility] [phone].    ATENCIÓN: Si ankur nascimento, tiene a contreras disposición servicios gratuitos de asistencia lingüística. Chad gallardo [phone].    We comply with applicable federal civil rights laws and [place] laws. We do not discriminate on the basis of race, color, national origin, age, disability, sex, sexual orientation, or gender identity.            Thank you!     Thank you for choosing [facility]  for your care. Our goal is always to provide you with excellent care. Hearing back from our patients is one way we can continue to improve our services. Please take a few minutes to complete the written survey that you may receive in the mail after your visit with us. Thank you!             Your Updated Medication List - Protect others around you: Learn how to safely use, store and throw away your medicines at www.disposemymeds.org.          This list is accurate as of 11/20/18  2:37 PM.  Always use your most recent med list.                   Brand Name Dispense Instructions for use Diagnosis    acyclovir 400 MG tablet    ZOVIRAX    60 tablet    Take 1 tablet (400 mg) by mouth 2 times daily Viral Prophylaxis.    Multiple myeloma not having achieved remission (H)       aspirin 81 MG chewable tablet      Take 81 mg by mouth daily        dexamethasone 4 MG tablet    DECADRON    20 tablet    Take 5 tablets (20 mg) by mouth once a week Once a week with food on Days 1,8,15.    Multiple myeloma not having achieved remission (H)       escitalopram 5 MG tablet    LEXAPRO    90 tablet    TAKE 1 TABLET BY MOUTH DAILY    ROBER (generalized anxiety disorder)       IMODIUM A-D PO      Take by mouth as needed        LENalidomide 10 MG Caps capsule CHEMO    REVLIMID    14 capsule    Take 1 capsule (10 mg) by mouth daily Take on Days 1 through 14 of 21-day cycle.    Multiple myeloma not having achieved remission (H)       lisinopril 10 MG tablet     PRINIVIL/ZESTRIL    90 tablet    Take 1 tablet (10 mg) by mouth daily    Benign essential hypertension       LORazepam 0.5 MG tablet    ATIVAN    30 tablet    Take 1 tablet (0.5 mg) by mouth every 8 hours as needed (Anxiety, Nausea/Vomiting or Sleep)    Multiple myeloma not having achieved remission (H)       MYRBETRIQ PO      Take 25 mg by mouth daily        nitroGLYcerin 0.4 MG sublingual tablet    NITROSTAT    25 tablet    For chest pain place 1 tablet under the tongue every 5 minutes for 3 doses. If symptoms persist 5 minutes after 1st dose call 911.    Atypical chest pain       * order for DME     1 Units    Dispense one 4 wheeled walker with hand brakes and seat    Multiple myeloma not having achieved remission (H)       * order for DME     1 Units    Wheelchair.    Need for assistance due to unsteady gait       prochlorperazine 10 MG tablet    COMPAZINE    30 tablet    Take 1 tablet (10 mg) by mouth every 6 hours as needed (Nausea/Vomiting)    Multiple myeloma not having achieved remission (H), Fatigue, unspecified type       trimethoprim-polymyxin b ophthalmic solution    POLYTRIM    1 Bottle    Place 1-2 drops into both eyes every 6 hours    Acute bacterial conjunctivitis of both eyes, Multiple myeloma not having achieved remission (H)       ZOMETA IV           * Notice:  This list has 2 medication(s) that are the same as other medications prescribed for you. Read the directions carefully, and ask your doctor or other care provider to review them with you.

## 2018-11-27 ENCOUNTER — TELEPHONE (OUTPATIENT)
Dept: CARDIOLOGY | Facility: CLINIC | Age: 83
End: 2018-11-27

## 2018-11-27 NOTE — TELEPHONE ENCOUNTER
Message left for DIL returning call as message was left on voicemail regarding her MIL. Awaiting return call to obtain patient symptoms.  FAUSTINO Valerio

## 2018-11-29 ENCOUNTER — TELEPHONE (OUTPATIENT)
Dept: CARDIOLOGY | Facility: CLINIC | Age: 83
End: 2018-11-29

## 2018-11-29 NOTE — TELEPHONE ENCOUNTER
Daughter Regi calling on behalf of Quiana, states Quiana had told her she has had increased SOB since monday. She then did ask if Quiana could be contacted for further info of her symptoms. Called Quiana blackwood and she states for the past week she actually has had increased fatigue, weak in general she states, SOB when she is up and ambulating with use of cane. She denies any chest pain, denies any recent edema or weight gain, denies any other symptoms. These are new symptoms for her she states. She is currently in Chemo treatment, and in fact says saw her Oncologist almost 2 weeks ago. At that time she did not have these symptoms, and he had listened to her heart and lungs she states, was told things were clear. She is advised should update her oncologist. She does have an appt on Monday, but is agreeable she should update Oncology. She is advised will contact daughter back and update her. Quiana is advised if she continues to have worsening SOB to call 911 or be seen in ER. Quiana verbalizes understanding, and understands daughter will be advised. Contacted daughter then, and she will contact/update Oncology re: Quiana and symptoms, and also verbalizes she will take Quiana to ER if worsening symptoms.

## 2018-11-30 RX ORDER — LENALIDOMIDE 10 MG/1
10 CAPSULE ORAL DAILY
Qty: 14 CAPSULE | Refills: 0 | Status: SHIPPED | OUTPATIENT
Start: 2018-11-30 | End: 2018-12-26

## 2018-11-30 RX ORDER — DEXAMETHASONE 4 MG/1
20 TABLET ORAL WEEKLY
Qty: 20 TABLET | Refills: 0 | Status: SHIPPED | OUTPATIENT
Start: 2018-11-30 | End: 2019-02-11

## 2018-12-03 ENCOUNTER — ONCOLOGY VISIT (OUTPATIENT)
Dept: ONCOLOGY | Facility: CLINIC | Age: 83
End: 2018-12-03
Attending: INTERNAL MEDICINE
Payer: MEDICARE

## 2018-12-03 ENCOUNTER — HOSPITAL ENCOUNTER (OUTPATIENT)
Facility: CLINIC | Age: 83
Setting detail: SPECIMEN
Discharge: HOME | End: 2018-12-03
Attending: INTERNAL MEDICINE | Admitting: INTERNAL MEDICINE
Payer: MEDICARE

## 2018-12-03 DIAGNOSIS — C90.00 MULTIPLE MYELOMA NOT HAVING ACHIEVED REMISSION (H): ICD-10-CM

## 2018-12-03 LAB
ALBUMIN SERPL-MCNC: 3.2 G/DL (ref 3.4–5)
ALP SERPL-CCNC: 82 U/L (ref 40–150)
ALT SERPL W P-5'-P-CCNC: 18 U/L (ref 0–50)
ANION GAP SERPL CALCULATED.3IONS-SCNC: 8 MMOL/L (ref 3–14)
AST SERPL W P-5'-P-CCNC: 12 U/L (ref 0–45)
BASOPHILS # BLD AUTO: 0 10E9/L (ref 0–0.2)
BASOPHILS NFR BLD AUTO: 0.5 %
BILIRUB SERPL-MCNC: 0.6 MG/DL (ref 0.2–1.3)
BUN SERPL-MCNC: 17 MG/DL (ref 7–30)
CALCIUM SERPL-MCNC: 9.2 MG/DL (ref 8.5–10.1)
CHLORIDE SERPL-SCNC: 106 MMOL/L (ref 94–109)
CO2 SERPL-SCNC: 27 MMOL/L (ref 20–32)
CREAT SERPL-MCNC: 0.68 MG/DL (ref 0.52–1.04)
DIFFERENTIAL METHOD BLD: NORMAL
EOSINOPHIL # BLD AUTO: 0.2 10E9/L (ref 0–0.7)
EOSINOPHIL NFR BLD AUTO: 4 %
ERYTHROCYTE [DISTWIDTH] IN BLOOD BY AUTOMATED COUNT: 13.2 % (ref 10–15)
GFR SERPL CREATININE-BSD FRML MDRD: 82 ML/MIN/1.7M2
GLUCOSE SERPL-MCNC: 126 MG/DL (ref 70–99)
HCT VFR BLD AUTO: 37.8 % (ref 35–47)
HGB BLD-MCNC: 12.8 G/DL (ref 11.7–15.7)
IMM GRANULOCYTES # BLD: 0 10E9/L (ref 0–0.4)
IMM GRANULOCYTES NFR BLD: 0.5 %
LYMPHOCYTES # BLD AUTO: 1.2 10E9/L (ref 0.8–5.3)
LYMPHOCYTES NFR BLD AUTO: 21.4 %
MCH RBC QN AUTO: 30.3 PG (ref 26.5–33)
MCHC RBC AUTO-ENTMCNC: 33.9 G/DL (ref 31.5–36.5)
MCV RBC AUTO: 90 FL (ref 78–100)
MONOCYTES # BLD AUTO: 0.9 10E9/L (ref 0–1.3)
MONOCYTES NFR BLD AUTO: 14.8 %
NEUTROPHILS # BLD AUTO: 3.4 10E9/L (ref 1.6–8.3)
NEUTROPHILS NFR BLD AUTO: 58.8 %
NRBC # BLD AUTO: 0 10*3/UL
NRBC BLD AUTO-RTO: 0 /100
PLATELET # BLD AUTO: 229 10E9/L (ref 150–450)
POTASSIUM SERPL-SCNC: 3.2 MMOL/L (ref 3.4–5.3)
PROT SERPL-MCNC: 6.6 G/DL (ref 6.8–8.8)
RBC # BLD AUTO: 4.22 10E12/L (ref 3.8–5.2)
SODIUM SERPL-SCNC: 141 MMOL/L (ref 133–144)
WBC # BLD AUTO: 5.8 10E9/L (ref 4–11)

## 2018-12-03 PROCEDURE — 85025 COMPLETE CBC W/AUTO DIFF WBC: CPT | Performed by: INTERNAL MEDICINE

## 2018-12-03 PROCEDURE — 36415 COLL VENOUS BLD VENIPUNCTURE: CPT

## 2018-12-03 PROCEDURE — 80053 COMPREHEN METABOLIC PANEL: CPT | Performed by: INTERNAL MEDICINE

## 2018-12-03 NOTE — PROGRESS NOTES
Medical Assistant Note:  Quiana Dunaway presents today for yes.    Patient seen by provider today: No.   present during visit today: Not Applicable.    Concerns: No Concerns.    Procedure:  Lab draw site: LAC, Needle type: BF, Gauge: 21. Gauze and coban applied    Post Assessment:  Labs drawn without difficulty: Yes.    Discharge Plan:  Departure Mode: Ambulatory.    Face to Face Time: 5.    Sultana Jones MA

## 2018-12-03 NOTE — LETTER
12/3/2018         RE: Quiana Dunaway  [address] [facility] [place] [facility] [place] [place] [address]        Dear Colleague,    Thank you for referring your patient, Quiana Dunaway, to the [facility]. Please see a copy of my visit note below.    Medical Assistant Note:  Quiana Dunaway presents today for yes.    Patient seen by provider today: No.   present during visit today: Not Applicable.    Concerns: No Concerns.    Procedure:  Lab draw site: LAC, Needle type: BF, Gauge: 21. Gauze and coban applied    Post Assessment:  Labs drawn without difficulty: Yes.    Discharge Plan:  Departure Mode: Ambulatory.    Face to Face Time: 5.    Sultana Jones MA              Again, thank you for allowing me to participate in the care of your patient.        Sincerely,        Oncology Nurse

## 2018-12-03 NOTE — MR AVS SNAPSHOT
After Visit Summary   12/3/2018    Quiana Dunaway    MRN: [id number]           Patient Information     Date Of Birth          1935        Visit Information        Provider Department      12/3/2018 2:00 PM Nurse,  Oncology [facility]        Today's Diagnoses     Multiple myeloma not having achieved remission (H)           Follow-ups after your visit        Your next 10 appointments already scheduled     Dec 12, 2018  1:30 PM CST   Office Visit with César Chung MD   [facility] ([facility])    [address] [facility] [address] [phone]           Bring a current list of meds and any records pertaining to this visit. For Physicals, please bring immunization records and any forms needing to be filled out. Please arrive 10 minutes early to complete paperwork.            Dec 26, 2018  2:00 PM CST   Level 1 with  INFUSION CHAIR 8   [facility] ([facility])    [facility]  [address]  [facility] [address] [phone]            Dec 26, 2018  2:40 PM CST   Return Visit with Alex Parry MD   [facility] ([facility])    [facility]  [address]  [facility] [address] [phone]            Jan 23, 2019  3:15 PM CST   Remote PPM Check with RIOS TECH1   [facility] ([facility])    [address]  [facility] [id number] [phone]           This appointment is for a remote check of your pacemaker.  This is not an appointment at the office.              Who to contact     If you have questions or need follow up information about today's clinic visit or your schedule please contact [facility] directly at [phone].  Normal or non-critical lab and imaging results will be communicated to you by MyChart, letter or phone within 4  business days after the clinic has received the results. If you do not hear from us within 7 days, please contact the clinic through [employer] or phone. If you have a critical or abnormal lab result, we will notify you by phone as soon as possible.  Submit refill requests through [employer] or call your pharmacy and they will forward the refill request to us. Please allow 3 business days for your refill to be completed.          Additional Information About Your Visit        [email or web address] Information     [employer] gives you secure access to your electronic health record. If you see a primary care provider, you can also send messages to your care team and make appointments. If you have questions, please call your primary care clinic.  If you do not have a primary care provider, please call [phone] and they will assist you.        Care EveryWhere ID     This is your Care EveryWhere ID. This could be used by other organizations to access your [place] medical records  [id number]         Blood Pressure from Last 3 Encounters:   11/20/18 115/74   11/14/18 (P) 125/77   10/15/18 119/73    Weight from Last 3 Encounters:   11/14/18 (P) 70.8 kg (156 lb)   10/08/18 70.3 kg (155 lb)   08/13/18 70.7 kg (155 lb 12.8 oz)              We Performed the Following     **Comprehensive metabolic panel FUTURE anytime     CBC with platelets and differential        Primary Care Provider Office Phone # Fax #    César Chung [phone] [address]  [place] [address]        Equal Access to Services     KEVON SANDOVAL : Hadii aad ku hadasho Soomaali, waaxda luqadaha, qaybta kaalmada adeegyada, phoenix rodriguez . So [facility] [phone].    ATENCIÓN: Si habla español, tiene a contreras disposición servicios gratuitos de asistencia lingüística. Llame al [phone].    We comply with applicable federal civil rights laws and [place] laws. We do not discriminate on the basis of race, color, national  origin, age, disability, sex, sexual orientation, or gender identity.            Thank you!     Thank you for choosing [facility]  for your care. Our goal is always to provide you with excellent care. Hearing back from our patients is one way we can continue to improve our services. Please take a few minutes to complete the written survey that you may receive in the mail after your visit with us. Thank you!             Your Updated Medication List - Protect others around you: Learn how to safely use, store and throw away your medicines at www.disposemymeds.org.          This list is accurate as of 12/3/18  2:09 PM.  Always use your most recent med list.                   Brand Name Dispense Instructions for use Diagnosis    acyclovir 400 MG tablet    ZOVIRAX    60 tablet    Take 1 tablet (400 mg) by mouth 2 times daily Viral Prophylaxis.    Multiple myeloma not having achieved remission (H)       aspirin 81 MG chewable tablet    ASA     Take 81 mg by mouth daily        * dexamethasone 4 MG tablet    DECADRON    20 tablet    Take 5 tablets (20 mg) by mouth once a week Once a week with food on Days 1,8,15.    Multiple myeloma not having achieved remission (H)       * dexamethasone 4 MG tablet    DECADRON    20 tablet    Take 5 tablets (20 mg) by mouth once a week Once a week with food on Days 1,8,15.    Multiple myeloma not having achieved remission (H)       escitalopram 5 MG tablet    LEXAPRO    90 tablet    TAKE 1 TABLET BY MOUTH DAILY    ROBER (generalized anxiety disorder)       IMODIUM A-D PO      Take by mouth as needed        * LENalidomide 10 MG Caps capsule CHEMO    REVLIMID    14 capsule    Take 1 capsule (10 mg) by mouth daily Take on Days 1 through 14 of 21-day cycle.    Multiple myeloma not having achieved remission (H)       * LENalidomide 10 MG Caps capsule CHEMO    REVLIMID    14 capsule    Take 1 capsule (10 mg) by mouth daily for 14 days Take on Days 1 through 14 of 21-day cycle.     Multiple myeloma not having achieved remission (H)       lisinopril 10 MG tablet    PRINIVIL/ZESTRIL    90 tablet    Take 1 tablet (10 mg) by mouth daily    Benign essential hypertension       LORazepam 0.5 MG tablet    ATIVAN    30 tablet    Take 1 tablet (0.5 mg) by mouth every 8 hours as needed (Anxiety, Nausea/Vomiting or Sleep)    Multiple myeloma not having achieved remission (H)       MYRBETRIQ PO      Take 25 mg by mouth daily        nitroGLYcerin 0.4 MG sublingual tablet    NITROSTAT    25 tablet    For chest pain place 1 tablet under the tongue every 5 minutes for 3 doses. If symptoms persist 5 minutes after 1st dose call 911.    Atypical chest pain       * order for DME     1 Units    Dispense one 4 wheeled walker with hand brakes and seat    Multiple myeloma not having achieved remission (H)       * order for DME     1 Units    Wheelchair.    Need for assistance due to unsteady gait       prochlorperazine 10 MG tablet    COMPAZINE    30 tablet    Take 1 tablet (10 mg) by mouth every 6 hours as needed (Nausea/Vomiting)    Multiple myeloma not having achieved remission (H), Fatigue, unspecified type       trimethoprim-polymyxin b 19494-3.1 UNIT/ML-% ophthalmic solution    POLYTRIM    1 Bottle    Place 1-2 drops into both eyes every 6 hours    Acute bacterial conjunctivitis of both eyes, Multiple myeloma not having achieved remission (H)       ZOMETA IV           * Notice:  This list has 6 medication(s) that are the same as other medications prescribed for you. Read the directions carefully, and ask your doctor or other care provider to review them with you.

## 2018-12-04 ENCOUNTER — TELEPHONE (OUTPATIENT)
Dept: ONCOLOGY | Facility: CLINIC | Age: 83
End: 2018-12-04

## 2018-12-04 DIAGNOSIS — E87.6 HYPOKALEMIA: Primary | ICD-10-CM

## 2018-12-04 RX ORDER — POTASSIUM CHLORIDE 1500 MG/1
40 TABLET, EXTENDED RELEASE ORAL ONCE
Qty: 2 TABLET | Refills: 0 | Status: SHIPPED | OUTPATIENT
Start: 2018-12-04 | End: 2018-12-04

## 2018-12-04 NOTE — TELEPHONE ENCOUNTER
A request for patient to take a potassium bump was requested after her potassium results came back at 3.2. Pharmacy advise a one time dose of 40mEq was indicated.     I called patient's home phone and was not able to connect or leave a message. I then called the mobile # listed and patient's niece answered and I was informed patient was not at home but she could take the message. I reviewed potassium result and one time script to be sent to [facility]'s and also provided education on potassium rich foods. Alecia Briggs

## 2018-12-12 ENCOUNTER — OFFICE VISIT (OUTPATIENT)
Dept: FAMILY MEDICINE | Facility: CLINIC | Age: 83
End: 2018-12-12
Payer: MEDICARE

## 2018-12-12 VITALS
OXYGEN SATURATION: 97 % | BODY MASS INDEX: 28.52 KG/M2 | DIASTOLIC BLOOD PRESSURE: 66 MMHG | TEMPERATURE: 98.8 F | SYSTOLIC BLOOD PRESSURE: 116 MMHG | HEIGHT: 62 IN | HEART RATE: 90 BPM | WEIGHT: 155 LBS

## 2018-12-12 DIAGNOSIS — N32.81 OAB (OVERACTIVE BLADDER): Primary | ICD-10-CM

## 2018-12-12 DIAGNOSIS — C90.00 MULTIPLE MYELOMA NOT HAVING ACHIEVED REMISSION (H): ICD-10-CM

## 2018-12-12 DIAGNOSIS — F41.1 GAD (GENERALIZED ANXIETY DISORDER): ICD-10-CM

## 2018-12-12 DIAGNOSIS — I10 BENIGN ESSENTIAL HYPERTENSION: ICD-10-CM

## 2018-12-12 PROCEDURE — 99213 OFFICE O/P EST LOW 20 MIN: CPT | Performed by: INTERNAL MEDICINE

## 2018-12-12 RX ORDER — MIRABEGRON 25 MG/1
25 TABLET, FILM COATED, EXTENDED RELEASE ORAL DAILY
Qty: 90 TABLET | Refills: 3 | Status: SHIPPED | OUTPATIENT
Start: 2018-12-12 | End: 2019-12-03

## 2018-12-12 ASSESSMENT — MIFFLIN-ST. JEOR: SCORE: 1116.33

## 2018-12-12 NOTE — PROGRESS NOTES
SUBJECTIVE:   Quiana Dunaway is a 83 year old female who presents to clinic today for the following health issues:      Medication Followup     Taking Medication as prescribed: yes    Side Effects:  None         Pleasant 83-year-old female with history of anxiety, hypertension, multiple myeloma.  She is a non-smoker she lives independently and still works.  Her myeloma seems to be responding to chemotherapy and she feels well and is without new complaints today.  She does have a degree of fatigue and exercise intolerance that she attributes to her cancer therapy, but the symptoms have not changed since her last visit with me.    Problem list and histories reviewed & adjusted, as indicated.  Additional history:     Patient Active Problem List   Diagnosis     Benign essential hypertension     Pancytopenia (H)     Multiple myeloma not having achieved remission (H)     Benzodiazepine overdose, accidental or unintentional, initial encounter     Atrial fibrillation, unspecified type (H)     Hypokalemia     Pacemaker     ROBER (generalized anxiety disorder)     Past Surgical History:   Procedure Laterality Date     BONE MARROW BIOPSY, BONE SPECIMEN, NEEDLE/TROCAR N/A 7/12/2017    Procedure: BIOPSY BONE MARROW;  BONE MARROW BIOPSY ;  Surgeon: Grace Vela MD;  Location:  GI     IMPLANT PACEMAKER  03/2017    AV block       Social History     Tobacco Use     Smoking status: Never Smoker     Smokeless tobacco: Never Used   Substance Use Topics     Alcohol use: No     Alcohol/week: 0.0 oz     Family History   Problem Relation Age of Onset     Unknown/Adopted Mother      Unknown/Adopted Father          Current Outpatient Medications   Medication Sig Dispense Refill     acyclovir (ZOVIRAX) 400 MG tablet Take 1 tablet (400 mg) by mouth 2 times daily Viral Prophylaxis. 60 tablet 5     aspirin 81 MG chewable tablet Take 81 mg by mouth daily        dexamethasone (DECADRON) 4 MG tablet Take 5 tablets (20 mg) by mouth  once a week Once a week with food on Days 1,8,15. 20 tablet 0     dexamethasone (DECADRON) 4 MG tablet Take 5 tablets (20 mg) by mouth once a week Once a week with food on Days 1,8,15. 20 tablet 0     escitalopram (LEXAPRO) 5 MG tablet TAKE 1 TABLET BY MOUTH DAILY 90 tablet 0     LENalidomide (REVLIMID) 10 MG CAPS capsule CHEMO Take 1 capsule (10 mg) by mouth daily for 14 days Take on Days 1 through 14 of 21-day cycle. 14 capsule 0     LENalidomide (REVLIMID) 10 MG CAPS capsule CHEMO Take 1 capsule (10 mg) by mouth daily Take on Days 1 through 14 of 21-day cycle. 14 capsule 0     LENalidomide (REVLIMID) 10 MG CAPS capsule CHEMO Take 1 capsule (10 mg) by mouth daily for 14 days Take on Days 1 through 14 of 21-day cycle. 14 capsule 0     LENalidomide (REVLIMID) 10 MG CAPS capsule CHEMO Take 1 capsule (10 mg) by mouth daily for 14 days Take on Days 1 through 14 of 21-day cycle. 14 capsule 0     LENalidomide (REVLIMID) 10 MG CAPS capsule CHEMO Take 1 capsule (10 mg) by mouth daily for 14 days Take on Days 1 through 14 of 21-day cycle. 14 capsule 0     LENalidomide (REVLIMID) 10 MG CAPS capsule CHEMO Take 1 capsule (10 mg) by mouth daily for 14 days Take on Days 1 through 14 of 21-day cycle. 14 capsule 0     LENalidomide (REVLIMID) 10 MG CAPS capsule CHEMO Take 1 capsule (10 mg) by mouth daily for 21 days Take on Days 1 through 21 of 28-day cycle. 21 capsule 0     LENalidomide (REVLIMID) 10 MG CAPS capsule CHEMO Take 1 capsule (10 mg) by mouth daily for 21 days Take on Days 1 through 21 of 28-day cycle. 21 capsule 0     LENalidomide (REVLIMID) 10 MG CAPS capsule CHEMO Take 1 capsule (10 mg) by mouth daily for 21 days Take on Days 1 through 21 of 28-day cycle. 21 capsule 0     LENalidomide (REVLIMID) 10 MG CAPS capsule CHEMO Take 1 capsule (10 mg) by mouth daily for 14 days Days 1 through 14 then off for 7 days. 14 capsule 0     LENalidomide (REVLIMID) 10 MG CAPS capsule CHEMO Take 1 capsule (10 mg) by mouth daily for 14  days Days 1 through 14. 14 capsule 0     LENalidomide (REVLIMID) 10 MG CAPS capsule CHEMO Take 1 capsule (10 mg) by mouth daily for 14 days Days 1 through 14. 5 capsule 0     LENalidomide (REVLIMID) 10 MG CAPS capsule CHEMO Take 1 capsule (10 mg) by mouth daily for 14 days Days 1 through 14. Then off for 7 days. 14 capsule 0     LENalidomide (REVLIMID) 10 MG CAPS capsule CHEMO Take 1 capsule (10 mg) by mouth daily for 14 days Days 1 through 14. 14 capsule 0     LENalidomide (REVLIMID) 10 MG CAPS capsule CHEMO Take 1 capsule (10 mg) by mouth daily for 14 days Days 1 through 14. 14 capsule 0     LENalidomide (REVLIMID) 10 MG CAPS capsule CHEMO Take 1 capsule (10 mg) by mouth daily for 14 days Days 1 through 14. 14 capsule 0     LENalidomide (REVLIMID) 10 MG CAPS capsule CHEMO Take 1 capsule (10 mg) by mouth daily for 14 days Days 1 through 14. 14 capsule 0     LENalidomide (REVLIMID) 10 MG CAPS capsule CHEMO Take 1 capsule (10 mg) by mouth daily for 14 days Days 1 through 14. Then off for 7 days. 14 capsule 0     LENalidomide (REVLIMID) 10 MG CAPS capsule CHEMO Take 1 capsule (10 mg) by mouth daily for 14 days Days 1 through 14. 14 capsule 0     LENalidomide (REVLIMID) 10 MG CAPS capsule CHEMO Take 1 capsule (10 mg) by mouth daily for 14 days Days 1 through 14. 14 capsule 0     LENalidomide (REVLIMID) 10 MG CAPS capsule CHEMO Take 1 capsule (10 mg) by mouth daily for 14 days Days 1 through 14. 14 capsule 0     LENalidomide (REVLIMID) 10 MG CAPS capsule CHEMO Take 1 capsule (10 mg) by mouth daily for 14 days Days 1 through 14. 14 capsule 0     lisinopril (PRINIVIL/ZESTRIL) 10 MG tablet Take 1 tablet (10 mg) by mouth daily 90 tablet 2     Loperamide HCl (IMODIUM A-D PO) Take by mouth as needed       LORazepam (ATIVAN) 0.5 MG tablet Take 1 tablet (0.5 mg) by mouth every 8 hours as needed (Anxiety, Nausea/Vomiting or Sleep) 30 tablet 3     mirabegron (MYRBETRIQ) 25 MG 24 hr tablet Take 1 tablet (25 mg) by mouth daily 90  "tablet 3     nitroglycerin (NITROSTAT) 0.4 MG sublingual tablet For chest pain place 1 tablet under the tongue every 5 minutes for 3 doses. If symptoms persist 5 minutes after 1st dose call 911. 25 tablet 0     order for DME Wheelchair. 1 Units 0     order for DME Dispense one 4 wheeled walker with hand brakes and seat 1 Units 0     prochlorperazine (COMPAZINE) 10 MG tablet Take 1 tablet (10 mg) by mouth every 6 hours as needed (Nausea/Vomiting) 30 tablet 3     trimethoprim-polymyxin b (POLYTRIM) ophthalmic solution Place 1-2 drops into both eyes every 6 hours 1 Bottle 0     Zoledronic Acid (ZOMETA IV)        No Known Allergies    Reviewed and updated as needed this visit by clinical staff  Tobacco  Allergies  Meds       Reviewed and updated as needed this visit by Provider         ROS:  Constitutional, HEENT, cardiovascular, pulmonary, gi and gu systems are negative, except as otherwise noted.    OBJECTIVE:     /66 (BP Location: Right arm, Patient Position: Chair, Cuff Size: Adult Regular)   Pulse 90   Temp 98.8  F (37.1  C) (Tympanic)   Ht 1.575 m (5' 2\")   Wt 70.3 kg (155 lb)   SpO2 97%   BMI 28.35 kg/m    Body mass index is 28.35 kg/m .  GENERAL: healthy, alert and no distress  EYES: Eyes grossly normal to inspection, PERRL and conjunctivae and sclerae normal  HENT: ear canals and TM's normal, nose and mouth without ulcers or lesions  NECK: no adenopathy, no asymmetry, masses, or scars and thyroid normal to palpation  RESP: lungs clear to auscultation - no rales, rhonchi or wheezes  CV: regular rate and rhythm, normal S1 S2, no S3 or S4, no murmur, click or rub, no peripheral edema and peripheral pulses strong  ABDOMEN: soft, nontender, no hepatosplenomegaly, no masses and bowel sounds normal  MS: no gross musculoskeletal defects noted, no edema  NEURO: Normal strength and tone, mentation intact and speech normal  PSYCH: mentation appears normal, affect normal/bright    Diagnostic Test " Results:  Results for orders placed or performed in visit on 12/03/18   CBC with platelets and differential   Result Value Ref Range    WBC 5.8 4.0 - 11.0 10e9/L    RBC Count 4.22 3.8 - 5.2 10e12/L    Hemoglobin 12.8 11.7 - 15.7 g/dL    Hematocrit 37.8 35.0 - 47.0 %    MCV 90 78 - 100 fl    MCH 30.3 26.5 - 33.0 pg    MCHC 33.9 31.5 - 36.5 g/dL    RDW 13.2 10.0 - 15.0 %    Platelet Count 229 150 - 450 10e9/L    Diff Method Automated Method     % Neutrophils 58.8 %    % Lymphocytes 21.4 %    % Monocytes 14.8 %    % Eosinophils 4.0 %    % Basophils 0.5 %    % Immature Granulocytes 0.5 %    Nucleated RBCs 0 0 /100    Absolute Neutrophil 3.4 1.6 - 8.3 10e9/L    Absolute Lymphocytes 1.2 0.8 - 5.3 10e9/L    Absolute Monocytes 0.9 0.0 - 1.3 10e9/L    Absolute Eosinophils 0.2 0.0 - 0.7 10e9/L    Absolute Basophils 0.0 0.0 - 0.2 10e9/L    Abs Immature Granulocytes 0.0 0 - 0.4 10e9/L    Absolute Nucleated RBC 0.0    **Comprehensive metabolic panel FUTURE anytime   Result Value Ref Range    Sodium 141 133 - 144 mmol/L    Potassium 3.2 (L) 3.4 - 5.3 mmol/L    Chloride 106 94 - 109 mmol/L    Carbon Dioxide 27 20 - 32 mmol/L    Anion Gap 8 3 - 14 mmol/L    Glucose 126 (H) 70 - 99 mg/dL    Urea Nitrogen 17 7 - 30 mg/dL    Creatinine 0.68 0.52 - 1.04 mg/dL    GFR Estimate 82 >60 mL/min/1.7m2    GFR Estimate If Black >90 >60 mL/min/1.7m2    Calcium 9.2 8.5 - 10.1 mg/dL    Bilirubin Total 0.6 0.2 - 1.3 mg/dL    Albumin 3.2 (L) 3.4 - 5.0 g/dL    Protein Total 6.6 (L) 6.8 - 8.8 g/dL    Alkaline Phosphatase 82 40 - 150 U/L    ALT 18 0 - 50 U/L    AST 12 0 - 45 U/L       ASSESSMENT/PLAN:         ICD-10-CM    1. OAB (overactive bladder) N32.81 mirabegron (MYRBETRIQ) 25 MG 24 hr tablet   2. Multiple myeloma not having achieved remission (H) C90.00    3. Benign essential hypertension I10    4. ROBER (generalized anxiety disorder) F41.1      Patient seems to be doing well with treatment for her multiple myeloma.  Her blood pressure is under  good control.  Her anxiety symptoms are under good control.  She asks for refill of Myrbetriq that was initiated by the urology clinic.  She has found it very helpful for her overactive bladder symptoms.  She is not having any side effects from it.          César Chung MD  [facility]

## 2018-12-26 ENCOUNTER — DOCUMENTATION ONLY (OUTPATIENT)
Dept: PHARMACY | Facility: CLINIC | Age: 83
End: 2018-12-26

## 2018-12-26 ENCOUNTER — INFUSION THERAPY VISIT (OUTPATIENT)
Dept: INFUSION THERAPY | Facility: CLINIC | Age: 83
End: 2018-12-26
Attending: INTERNAL MEDICINE
Payer: MEDICARE

## 2018-12-26 ENCOUNTER — HOSPITAL ENCOUNTER (OUTPATIENT)
Facility: CLINIC | Age: 83
Setting detail: SPECIMEN
Discharge: HOME | End: 2018-12-26
Attending: INTERNAL MEDICINE | Admitting: INTERNAL MEDICINE
Payer: MEDICARE

## 2018-12-26 ENCOUNTER — ONCOLOGY VISIT (OUTPATIENT)
Dept: ONCOLOGY | Facility: CLINIC | Age: 83
End: 2018-12-26
Attending: INTERNAL MEDICINE
Payer: MEDICARE

## 2018-12-26 VITALS
BODY MASS INDEX: 28.52 KG/M2 | TEMPERATURE: 98.1 F | OXYGEN SATURATION: 94 % | WEIGHT: 155 LBS | HEART RATE: 75 BPM | DIASTOLIC BLOOD PRESSURE: 75 MMHG | RESPIRATION RATE: 16 BRPM | SYSTOLIC BLOOD PRESSURE: 113 MMHG | HEIGHT: 62 IN

## 2018-12-26 VITALS
DIASTOLIC BLOOD PRESSURE: 73 MMHG | HEART RATE: 75 BPM | OXYGEN SATURATION: 94 % | SYSTOLIC BLOOD PRESSURE: 113 MMHG | TEMPERATURE: 98.1 F

## 2018-12-26 DIAGNOSIS — C90.00 MULTIPLE MYELOMA NOT HAVING ACHIEVED REMISSION (H): Primary | ICD-10-CM

## 2018-12-26 LAB
ALBUMIN SERPL-MCNC: 3.1 G/DL (ref 3.4–5)
ALP SERPL-CCNC: 71 U/L (ref 40–150)
ALT SERPL W P-5'-P-CCNC: 18 U/L (ref 0–50)
ANION GAP SERPL CALCULATED.3IONS-SCNC: 8 MMOL/L (ref 3–14)
AST SERPL W P-5'-P-CCNC: 10 U/L (ref 0–45)
BASOPHILS # BLD AUTO: 0 10E9/L (ref 0–0.2)
BASOPHILS NFR BLD AUTO: 0.2 %
BILIRUB SERPL-MCNC: 0.7 MG/DL (ref 0.2–1.3)
BUN SERPL-MCNC: 20 MG/DL (ref 7–30)
CALCIUM SERPL-MCNC: 8.3 MG/DL (ref 8.5–10.1)
CHLORIDE SERPL-SCNC: 109 MMOL/L (ref 94–109)
CO2 SERPL-SCNC: 25 MMOL/L (ref 20–32)
CREAT SERPL-MCNC: 0.64 MG/DL (ref 0.52–1.04)
DIFFERENTIAL METHOD BLD: NORMAL
EOSINOPHIL # BLD AUTO: 0.1 10E9/L (ref 0–0.7)
EOSINOPHIL NFR BLD AUTO: 1.8 %
ERYTHROCYTE [DISTWIDTH] IN BLOOD BY AUTOMATED COUNT: 13.5 % (ref 10–15)
GFR SERPL CREATININE-BSD FRML MDRD: 83 ML/MIN/{1.73_M2}
GLUCOSE SERPL-MCNC: 88 MG/DL (ref 70–99)
HCT VFR BLD AUTO: 36.3 % (ref 35–47)
HGB BLD-MCNC: 12.4 G/DL (ref 11.7–15.7)
IMM GRANULOCYTES # BLD: 0 10E9/L (ref 0–0.4)
IMM GRANULOCYTES NFR BLD: 0.2 %
LYMPHOCYTES # BLD AUTO: 1.7 10E9/L (ref 0.8–5.3)
LYMPHOCYTES NFR BLD AUTO: 34.2 %
MCH RBC QN AUTO: 30.4 PG (ref 26.5–33)
MCHC RBC AUTO-ENTMCNC: 34.2 G/DL (ref 31.5–36.5)
MCV RBC AUTO: 89 FL (ref 78–100)
MONOCYTES # BLD AUTO: 0.8 10E9/L (ref 0–1.3)
MONOCYTES NFR BLD AUTO: 15.5 %
NEUTROPHILS # BLD AUTO: 2.4 10E9/L (ref 1.6–8.3)
NEUTROPHILS NFR BLD AUTO: 48.1 %
NRBC # BLD AUTO: 0 10*3/UL
NRBC BLD AUTO-RTO: 0 /100
PLATELET # BLD AUTO: 169 10E9/L (ref 150–450)
POTASSIUM SERPL-SCNC: 3.3 MMOL/L (ref 3.4–5.3)
PROT SERPL-MCNC: 6.3 G/DL (ref 6.8–8.8)
RBC # BLD AUTO: 4.08 10E12/L (ref 3.8–5.2)
SODIUM SERPL-SCNC: 142 MMOL/L (ref 133–144)
WBC # BLD AUTO: 4.9 10E9/L (ref 4–11)

## 2018-12-26 PROCEDURE — 99214 OFFICE O/P EST MOD 30 MIN: CPT | Performed by: INTERNAL MEDICINE

## 2018-12-26 PROCEDURE — G0463 HOSPITAL OUTPT CLINIC VISIT: HCPCS

## 2018-12-26 PROCEDURE — 36415 COLL VENOUS BLD VENIPUNCTURE: CPT

## 2018-12-26 PROCEDURE — 80053 COMPREHEN METABOLIC PANEL: CPT | Performed by: INTERNAL MEDICINE

## 2018-12-26 PROCEDURE — 85025 COMPLETE CBC W/AUTO DIFF WBC: CPT | Performed by: INTERNAL MEDICINE

## 2018-12-26 RX ORDER — LENALIDOMIDE 10 MG/1
10 CAPSULE ORAL DAILY
Qty: 14 CAPSULE | Refills: 0 | Status: SHIPPED | OUTPATIENT
Start: 2018-12-26 | End: 2019-02-11

## 2018-12-26 RX ORDER — DEXAMETHASONE 4 MG/1
20 TABLET ORAL WEEKLY
Qty: 20 TABLET | Refills: 0 | Status: SHIPPED | OUTPATIENT
Start: 2018-12-26 | End: 2019-02-11

## 2018-12-26 ASSESSMENT — PAIN SCALES - GENERAL: PAINLEVEL: NO PAIN (0)

## 2018-12-26 ASSESSMENT — MIFFLIN-ST. JEOR: SCORE: 1116.33

## 2018-12-26 NOTE — PROGRESS NOTES
Infusion Nursing Note:  Quiana Dunaway presents today for MD Reddy, labs.    Patient seen by provider today: Yes: Sohan   present during visit today: Not Applicable.    Note: N/A.    Intravenous Access:  Labs drawn without difficulty.  Peripheral IV placed.    Treatment Conditions:  Zometa held today (per Sohan) until dental work is complete.      Post Infusion Assessment:  Patient tolerated infusion without incident.  Blood return noted pre and post infusion.  Site patent and intact, free from redness, edema or discomfort.  No evidence of extravasations.  Access discontinued per protocol.    Discharge Plan:   Discharge instructions reviewed with: Patient and Family.  Patient and/or family verbalized understanding of discharge instructions and all questions answered.  Copy of AVS reviewed with patient and/or family.  Patient will return as scheduled for next appointment.  Patient discharged in stable condition accompanied by: self.  Departure Mode: Ambulatory.    Jessica Shetty RN

## 2018-12-26 NOTE — PROGRESS NOTES
"Oncology Rooming Note    December 26, 2018 2:22 PM   Quiana Dunaway is a 83 year old female who presents for:    Chief Complaint   Patient presents with     Oncology Clinic Visit     Multiple myeloma not having achieved remission (H)     Initial Vitals: /75   Pulse 75   Temp 98.1  F (36.7  C) (Oral)   Resp 16   Ht 1.575 m (5' 2\")   Wt 70.3 kg (155 lb)   SpO2 94%   BMI 28.35 kg/m   Estimated body mass index is 28.35 kg/m  as calculated from the following:    Height as of this encounter: 1.575 m (5' 2\").    Weight as of this encounter: 70.3 kg (155 lb). Body surface area is 1.75 meters squared.  No Pain (0) Comment: Data Unavailable   No LMP recorded. Patient is postmenopausal.  Allergies reviewed: Yes  Medications reviewed: Yes    Medications: Refill of REVLIMID NEEDED  Pharmacy name entered into [employer]:    [facility] [place] - [address] [facility] [address] [place] [address] - [address] [facility] [address]    Clinical concerns: no     8 minutes for nursing intake (face to face time)     Rhoda Ballard CMA              "

## 2018-12-26 NOTE — PROGRESS NOTES
Oral Chemotherapy Monitoring Program.    Patient currently on revlimid therapy.    Reviewed labs from 12/26/18. No concerning abnormalities. (2 weeks on 1 week off)    Questions answered to patient's satisfaction.    Will follow up in 3 weeks with repeat labs.    Debra Matos PharmD  December 26, 2018

## 2018-12-26 NOTE — Clinical Note
"    12/26/2018         RE: Quiana Dunaway  [address] [facility] [id number]        Dear Colleague,    Thank you for referring your patient, Quiana Dunaway, to the [facility]. Please see a copy of my visit note below.    Oncology Rooming Note    December 26, 2018 2:22 PM   Quiana Dunaway is a 83 year old female who presents for:    Chief Complaint   Patient presents with     Oncology Clinic Visit     Multiple myeloma not having achieved remission (H)     Initial Vitals: /75   Pulse 75   Temp 98.1  F (36.7  C) (Oral)   Resp 16   Ht 1.575 m (5' 2\")   Wt 70.3 kg (155 lb)   SpO2 94%   BMI 28.35 kg/m    Estimated body mass index is 28.35 kg/m  as calculated from the following:    Height as of this encounter: 1.575 m (5' 2\").    Weight as of this encounter: 70.3 kg (155 lb). Body surface area is 1.75 meters squared.  No Pain (0) Comment: Data Unavailable   No LMP recorded. Patient is postmenopausal.  Allergies reviewed: Yes  Medications reviewed: Yes    Medications: Refill of REVLIMID NEEDED  Pharmacy name entered into En[employer]:    [facility], [place] - [address] [facility] [address] [place] [address] - [address] [facility] [address]    Clinical concerns: no     8 minutes for nursing intake (face to face time)     Rhoda Ballard CMA                Visit Date:   12/26/2018      SUBJECTIVE:  Ms. Dunaway is an 83-year-old female with high-risk multiple myeloma.  She is currently on Revlimid and dexamethasone.  Overall, she is tolerating it well.      For the last few weeks, she is doing good.  No headache or dizziness.  No chest pain, difficulty breathing.  No abdominal pain, nausea or vomiting.  Appetite good.  No urinary or bowel complaints.  No bleeding.  No fever or chills.      The patient has some dental problem.  She is going to have some dental work done in the next few weeks.      We have been monitoring her myeloma labs.  Her IgG level and " kappa free light chain have increased mildly.  Plan at this time is to continue the same treatment as she is doing good.      PHYSICAL EXAMINATION:  Unchanged.      LABORATORY DATA:  Reviewed.      ASSESSMENT:   1.  An 83-year-old female with high-risk IgA kappa multiple myeloma.   2.  Mild hypokalemia.   3.  Dental problem.      PLAN:   1.  I discussed regarding myeloma.  The patient clinically is stable.  We will continue on Revlimid and dexamethasone.  She is tolerating it well.  Side effects reviewed.   2.  The patient is going to have dental work.  Because of that, we will hold the Zometa.   3.  I will see her in February with labs.  Advised her to see a physician sooner if she has worsening weakness, chest pain, shortness of breath, recurrent vomiting, bleeding or any other concerns.     4.  She is on aspirin, acyclovir, which she will continue.         YVES PEREZ MD             D: 2018   T: 2018   MT: KIMI      Name:     DUGLAS CASTILLO   MRN:      [id number]        Account:      [id number]   :      1935           Visit Date:   2018      Document: [id number]       Again, thank you for allowing me to participate in the care of your patient.        Sincerely,        Yves Perez MD

## 2018-12-27 NOTE — PROGRESS NOTES
Visit Date:   12/26/2018     HEMATOLOGY HISTORY: Mrs. Dunaway is a lady with multiple myeloma (IgG kappa). High risk, t(14;16).  1. On 05/01/2017, WBC of 3.4, hemoglobin of 10.2 and platelet of 154.  2. SPEP on 07/07/2017 revealed M-spike of 1.8.  3. Bone marrow biopsy on 07/12/2017 reveals kappa monotypic plasma cell population consistent with multiple myeloma.  Bone marrow is 70% cellular.  Plasma cell is 50-60%.     -There is gain of chromosome 1, 5, 9, 15, and 17.  There is translocation 14;16.   4.  On 07/18/2017:  -M-spike of 1.9.   -Immunofixation reveals monoclonal IgG kappa.    -IgG level of 2970.  IgA of 15 and IgM of 175.    -Kappa free light chain of 67.7.  Lambda free light chain of 1.42.  Ratio of kappa to lambda of 47.71.    -Beta 2 microglobulin of 3.4.   5. PET scan on 07/24/2017 reveals several focal areas of increased FDG uptake consistent with multiple myeloma.  There is probable epithelial cyst in tail of the pancreas.  No associated abnormal FDG activity.  Left thyroid cysts or nodules without any FDG activity.  There is a 0.3 cm left upper lobe lung nodule.   8.  Velcade, Revlimid and dexamethasone between on 08/14/2017 and 04/30/2018. Velcade stopped.   -Revlimid and dexamethasone continued.     SUBJECTIVE:  Ms. Dunaway is an 83-year-old female with high-risk multiple myeloma.  She is currently on Revlimid and dexamethasone.  Overall, she is tolerating it well.      For last few weeks, she is doing good.  No headache or dizziness.  No chest pain or difficulty breathing.  No abdominal pain, nausea or vomiting.  Appetite is good.  No urinary or bowel complaints.  No bleeding.  No fever or chills.      Patient has some dental problem.  She is going to have some dental work done in the next few weeks.      We have been monitoring her myeloma labs.  Her IgG level and kappa free light chain have increased mildly.  Plan at this time is to continue the same treatment as she is doing good.       PHYSICAL EXAMINATION:   GENERAL:  She is alert and oriented x 3.   VITAL SIGNS:  Reviewed.  ECOG PS of 2.   FACE:  No swelling.     EYES:  No icterus.    THROAT:  No ulcer.   NECK:  Supple. No lymphadenopathy.  No tenderness.   LUNGS:  Good air entry bilaterally.  No wheezing.   HEART:  Regular.   ABDOMEN:  Soft.  Nontender.  No mass.   EXTREMITIES:  No edema. No calf redness or tenderness.   SKIN:  No rash. No petechiae.      LABORATORY DATA:  Reviewed.      ASSESSMENT:   1.  An 83-year-old female with high-risk IgG kappa multiple myeloma.   2.  Mild hypokalemia.   3.  Dental problem.      PLAN:   1.  I discussed regarding myeloma.  Patient clinically is stable.  She will continue on Revlimid and dexamethasone.  She is tolerating it well.  Side effects reviewed.   2.  Ptient is going to have dental work.  Because of that, we will hold the Zometa.   3.  I will see her in February with labs.  Advised her to see a physician sooner if she has worsening weakness, chest pain, shortness of breath, recurrent vomiting, bleeding or any other concerns.     4.  She is on aspirin and acyclovir, which she will continue.         YVES PEREZ MD             D: 2018   T: 2018   MT: KIMI      Name:     DUGLAS CASTILLO   MRN:      [id number]        Account:      [id number]   :      1935           Visit Date:   2018      Document: [id number]

## 2019-01-10 DIAGNOSIS — C90.00 MULTIPLE MYELOMA NOT HAVING ACHIEVED REMISSION (H): Primary | ICD-10-CM

## 2019-01-11 RX ORDER — DEXAMETHASONE 4 MG/1
20 TABLET ORAL WEEKLY
Qty: 20 TABLET | Refills: 0 | Status: SHIPPED | OUTPATIENT
Start: 2019-01-11 | End: 2019-02-11

## 2019-01-11 RX ORDER — LENALIDOMIDE 10 MG/1
10 CAPSULE ORAL DAILY
Qty: 14 CAPSULE | Refills: 0 | Status: SHIPPED | OUTPATIENT
Start: 2019-01-11 | End: 2019-02-11

## 2019-01-16 ENCOUNTER — TELEPHONE (OUTPATIENT)
Dept: ONCOLOGY | Facility: CLINIC | Age: 84
End: 2019-01-16

## 2019-01-16 NOTE — TELEPHONE ENCOUNTER
Oral Chemotherapy Monitoring Program     Placed call to patient in follow up of Revlmid therapy.     Left message to please call back in follow-up of therapy as patient is due for labs.    Laci Escobar PharmD.

## 2019-01-17 ENCOUNTER — DOCUMENTATION ONLY (OUTPATIENT)
Dept: PHARMACY | Facility: CLINIC | Age: 84
End: 2019-01-17

## 2019-01-17 NOTE — PROGRESS NOTES
Oral Chemotherapy Monitoring Program.    Patient currently on Revlimid therapy and is scheduled to start the next cycle on 1/20/19. The patient was due for labs this week.    I called patient and talked to her daughter in law who was wondering if we can check with Dr Parry if its okay to skip labs this month. She said the patient is 83 years and it's hard for her to come for labs. I checked with Dr Parry who okayed skipping labs this month.    We will check for home care coverage and if patient has coverage home care can assist with checking labs at home on the months the patient does not have clinic appnt. The patient will need monthly labs prior to each cycle while on Revlimid.    Laci BonillaD.

## 2019-01-31 ENCOUNTER — DOCUMENTATION ONLY (OUTPATIENT)
Dept: CARDIOLOGY | Facility: CLINIC | Age: 84
End: 2019-01-31

## 2019-01-31 NOTE — TELEPHONE ENCOUNTER
Patient missed carelink transmission on 1/23/19. Sent letter 1/24, no response. Sent 1 week letter today.

## 2019-02-07 DIAGNOSIS — C90.00 MULTIPLE MYELOMA NOT HAVING ACHIEVED REMISSION (H): Primary | ICD-10-CM

## 2019-02-07 RX ORDER — DEXAMETHASONE 4 MG/1
20 TABLET ORAL WEEKLY
Qty: 20 TABLET | Refills: 0 | Status: SHIPPED | OUTPATIENT
Start: 2019-02-07 | End: 2019-03-11

## 2019-02-07 RX ORDER — LENALIDOMIDE 10 MG/1
10 CAPSULE ORAL DAILY
Qty: 14 CAPSULE | Refills: 0 | Status: SHIPPED | OUTPATIENT
Start: 2019-02-07 | End: 2019-03-11

## 2019-02-11 ENCOUNTER — HOSPITAL ENCOUNTER (OUTPATIENT)
Facility: CLINIC | Age: 84
Setting detail: SPECIMEN
Discharge: HOME | End: 2019-02-11
Attending: INTERNAL MEDICINE | Admitting: INTERNAL MEDICINE
Payer: MEDICARE

## 2019-02-11 ENCOUNTER — DOCUMENTATION ONLY (OUTPATIENT)
Dept: ONCOLOGY | Facility: CLINIC | Age: 84
End: 2019-02-11

## 2019-02-11 ENCOUNTER — ONCOLOGY VISIT (OUTPATIENT)
Dept: ONCOLOGY | Facility: CLINIC | Age: 84
End: 2019-02-11
Attending: INTERNAL MEDICINE
Payer: MEDICARE

## 2019-02-11 ENCOUNTER — INFUSION THERAPY VISIT (OUTPATIENT)
Dept: INFUSION THERAPY | Facility: CLINIC | Age: 84
End: 2019-02-11
Attending: INTERNAL MEDICINE
Payer: MEDICARE

## 2019-02-11 VITALS
HEART RATE: 80 BPM | DIASTOLIC BLOOD PRESSURE: 82 MMHG | TEMPERATURE: 97.9 F | OXYGEN SATURATION: 96 % | SYSTOLIC BLOOD PRESSURE: 145 MMHG | RESPIRATION RATE: 18 BRPM

## 2019-02-11 VITALS
RESPIRATION RATE: 18 BRPM | DIASTOLIC BLOOD PRESSURE: 82 MMHG | SYSTOLIC BLOOD PRESSURE: 145 MMHG | TEMPERATURE: 97.9 F | BODY MASS INDEX: 28.35 KG/M2 | OXYGEN SATURATION: 96 % | HEIGHT: 62 IN | HEART RATE: 80 BPM

## 2019-02-11 DIAGNOSIS — C90.00 MULTIPLE MYELOMA NOT HAVING ACHIEVED REMISSION (H): Primary | ICD-10-CM

## 2019-02-11 LAB
ALBUMIN SERPL-MCNC: 3.3 G/DL (ref 3.4–5)
ALP SERPL-CCNC: 82 U/L (ref 40–150)
ALT SERPL W P-5'-P-CCNC: 18 U/L (ref 0–50)
ANION GAP SERPL CALCULATED.3IONS-SCNC: 6 MMOL/L (ref 3–14)
AST SERPL W P-5'-P-CCNC: 16 U/L (ref 0–45)
BASOPHILS # BLD AUTO: 0 10E9/L (ref 0–0.2)
BASOPHILS NFR BLD AUTO: 0.8 %
BILIRUB SERPL-MCNC: 0.6 MG/DL (ref 0.2–1.3)
BUN SERPL-MCNC: 13 MG/DL (ref 7–30)
CALCIUM SERPL-MCNC: 8.5 MG/DL (ref 8.5–10.1)
CHLORIDE SERPL-SCNC: 109 MMOL/L (ref 94–109)
CO2 SERPL-SCNC: 26 MMOL/L (ref 20–32)
CREAT SERPL-MCNC: 0.57 MG/DL (ref 0.52–1.04)
DIFFERENTIAL METHOD BLD: ABNORMAL
EOSINOPHIL # BLD AUTO: 0.1 10E9/L (ref 0–0.7)
EOSINOPHIL NFR BLD AUTO: 1.6 %
ERYTHROCYTE [DISTWIDTH] IN BLOOD BY AUTOMATED COUNT: 13.3 % (ref 10–15)
GFR SERPL CREATININE-BSD FRML MDRD: 86 ML/MIN/{1.73_M2}
GLUCOSE SERPL-MCNC: 129 MG/DL (ref 70–99)
HCT VFR BLD AUTO: 38.9 % (ref 35–47)
HGB BLD-MCNC: 13.3 G/DL (ref 11.7–15.7)
IMM GRANULOCYTES # BLD: 0 10E9/L (ref 0–0.4)
IMM GRANULOCYTES NFR BLD: 0.4 %
LYMPHOCYTES # BLD AUTO: 1.6 10E9/L (ref 0.8–5.3)
LYMPHOCYTES NFR BLD AUTO: 31.3 %
MCH RBC QN AUTO: 30.3 PG (ref 26.5–33)
MCHC RBC AUTO-ENTMCNC: 34.2 G/DL (ref 31.5–36.5)
MCV RBC AUTO: 89 FL (ref 78–100)
MONOCYTES # BLD AUTO: 0.7 10E9/L (ref 0–1.3)
MONOCYTES NFR BLD AUTO: 13.5 %
NEUTROPHILS # BLD AUTO: 2.6 10E9/L (ref 1.6–8.3)
NEUTROPHILS NFR BLD AUTO: 52.4 %
NRBC # BLD AUTO: 0 10*3/UL
NRBC BLD AUTO-RTO: 0 /100
PLATELET # BLD AUTO: 129 10E9/L (ref 150–450)
POTASSIUM SERPL-SCNC: 3.5 MMOL/L (ref 3.4–5.3)
PROT SERPL-MCNC: 6.4 G/DL (ref 6.8–8.8)
RBC # BLD AUTO: 4.39 10E12/L (ref 3.8–5.2)
SODIUM SERPL-SCNC: 141 MMOL/L (ref 133–144)
WBC # BLD AUTO: 5 10E9/L (ref 4–11)

## 2019-02-11 PROCEDURE — G0463 HOSPITAL OUTPT CLINIC VISIT: HCPCS | Mod: 25

## 2019-02-11 PROCEDURE — 99213 OFFICE O/P EST LOW 20 MIN: CPT | Performed by: INTERNAL MEDICINE

## 2019-02-11 PROCEDURE — 82784 ASSAY IGA/IGD/IGG/IGM EACH: CPT | Performed by: INTERNAL MEDICINE

## 2019-02-11 PROCEDURE — 96374 THER/PROPH/DIAG INJ IV PUSH: CPT

## 2019-02-11 PROCEDURE — 80053 COMPREHEN METABOLIC PANEL: CPT | Performed by: INTERNAL MEDICINE

## 2019-02-11 PROCEDURE — 83883 ASSAY NEPHELOMETRY NOT SPEC: CPT | Performed by: INTERNAL MEDICINE

## 2019-02-11 PROCEDURE — 00000402 ZZHCL STATISTIC TOTAL PROTEIN: Performed by: INTERNAL MEDICINE

## 2019-02-11 PROCEDURE — 84165 PROTEIN E-PHORESIS SERUM: CPT | Performed by: INTERNAL MEDICINE

## 2019-02-11 PROCEDURE — 85025 COMPLETE CBC W/AUTO DIFF WBC: CPT | Performed by: INTERNAL MEDICINE

## 2019-02-11 PROCEDURE — 25000128 H RX IP 250 OP 636: Performed by: INTERNAL MEDICINE

## 2019-02-11 RX ORDER — ZOLEDRONIC ACID 0.04 MG/ML
4 INJECTION, SOLUTION INTRAVENOUS ONCE
Status: COMPLETED | OUTPATIENT
Start: 2019-02-11 | End: 2019-02-11

## 2019-02-11 RX ORDER — ZOLEDRONIC ACID 0.04 MG/ML
4 INJECTION, SOLUTION INTRAVENOUS ONCE
Status: CANCELLED | OUTPATIENT
Start: 2019-02-11 | End: 2019-02-11

## 2019-02-11 RX ADMIN — ZOLEDRONIC ACID 4 MG: 0.04 INJECTION, SOLUTION INTRAVENOUS at 14:25

## 2019-02-11 ASSESSMENT — PAIN SCALES - GENERAL
PAINLEVEL: NO PAIN (0)
PAINLEVEL: NO PAIN (0)

## 2019-02-11 NOTE — PROGRESS NOTES
"Oncology Rooming Note    February 11, 2019 1:46 PM   Quiana Dunaway is a 83 year old female who presents for:    Chief Complaint   Patient presents with     Oncology Clinic Visit     Multiple myeloma not having achieved remission (H)     Initial Vitals: /82 (Cuff Size: Adult Regular)   Pulse 80   Temp 97.9  F (36.6  C) (Oral)   Resp 18   Ht 1.575 m (5' 2\")   SpO2 96%   BMI 28.35 kg/m   Estimated body mass index is 28.35 kg/m  as calculated from the following:    Height as of this encounter: 1.575 m (5' 2\").    Weight as of 12/26/18: 70.3 kg (155 lb). Body surface area is 1.75 meters squared.  No Pain (0) Comment: Data Unavailable   No LMP recorded. Patient is postmenopausal.  Allergies reviewed: Yes  Medications reviewed: Yes    Medications: Medication refills not needed today.  Pharmacy name entered into WowOw[employer]:    [facility] [place], [place] - [address]  [facility] - [place], [place] [address] AT [facility] [address]  [facility] - [place], [place] - [address]    Clinical concerns: none     8 minutes for nursing intake (face to face time)     Rhoda Ballard CMA              "

## 2019-02-11 NOTE — Clinical Note
"    2/11/2019         RE: Quiana Dunaway  [address] [facility] [address]        Dear Colleague,    Thank you for referring your patient, Quiana Dunaway, to the [facility]CER [facility]. Please see a copy of my visit note below.    Oncology Rooming Note    February 11, 2019 1:46 PM   Quiana Dunaway is a 83 year old female who presents for:    Chief Complaint   Patient presents with     Oncology Clinic Visit     Multiple myeloma not having achieved remission (H)     Initial Vitals: /82 (Cuff Size: Adult Regular)   Pulse 80   Temp 97.9  F (36.6  C) (Oral)   Resp 18   Ht 1.575 m (5' 2\")   SpO2 96%   BMI 28.35 kg/m    Estimated body mass index is 28.35 kg/m  as calculated from the following:    Height as of this encounter: 1.575 m (5' 2\").    Weight as of 12/26/18: 70.3 kg (155 lb). Body surface area is 1.75 meters squared.  No Pain (0) Comment: Data Unavailable   No LMP recorded. Patient is postmenopausal.  Allergies reviewed: Yes  Medications reviewed: Yes    Medications: Medication refills not needed today.  Pharmacy name entered into [facility]:    [facility] [place], [place] - [address]  [facility] [address] [place] [address] -  [address] [facility] [address]  [facility] - [place], [place] - [address]    Clinical concerns: none     8 minutes for nursing intake (face to face time)     Rhoda Ballard [facility]                Visit Date:   02/11/2019     HEMATOLOGY HISTORY: Mrs. Dunaway is a lady with multiple myeloma (IgG kappa). High risk, t(14;16).  1. On 05/01/2017, WBC of 3.4, hemoglobin of 10.2 and platelet of 154.  2. SPEP on 07/07/2017 revealed M-spike of 1.8.  3. Bone marrow biopsy on 07/12/2017 reveals kappa monotypic plasma cell population consistent with multiple myeloma.  Bone marrow is 70% cellular.  Plasma cell is 50-60%.     -There is gain of chromosome 1, 5, 9, 15, and 17.  There is translocation 14;16.   4.  On " 07/18/2017:  -M-spike of 1.9.   -Immunofixation reveals monoclonal IgG kappa.    -IgG level of 2970.  IgA of 15 and IgM of 175.    -Kappa free light chain of 67.7.  Lambda free light chain of 1.42.  Ratio of kappa to lambda of 47.71.    -Beta 2 microglobulin of 3.4.   5. PET scan on 07/24/2017 reveals several focal areas of increased FDG uptake consistent with multiple myeloma.  There is probable epithelial cyst in tail of the pancreas.  No associated abnormal FDG activity.  Left thyroid cysts or nodules without any FDG activity.  There is a 0.3 cm left upper lobe lung nodule.   8.  Velcade, Revlimid and dexamethasone between on 08/14/2017 and 04/30/2018. Velcade stopped.   -Revlimid and dexamethasone continued.     SUBJECTIVE:  Ms. Dunaway is an 83-year-old female with high-risk IgG kappa multiple myeloma.  She is currently on Revlimid and dexamethasone.  She is tolerating it well.  Overall, she is doing well.  No headache.  No dizziness.  No chest pain.  No difficulty breathing.  No abdominal pain, nausea or vomiting.  Appetite overall has been good.  No urinary or bowel complaints.  No bleeding.  No fever, chills or night sweats.  No weight loss.      PHYSICAL EXAMINATION:   GENERAL:  She is alert and oriented x 3.   VITAL SIGNS:  Reviewed.  ECOG PS of 2.   FACE:  No swelling.     EYES:  No icterus.    THROAT:  No ulcer.   NECK:  Supple. No lymphadenopathy.  No tenderness.   LUNGS:  Good air entry bilaterally.  No wheezing.   HEART:  Regular.   ABDOMEN:  Soft.  Nontender.  No mass.   EXTREMITIES:  No edema. No calf redness or tenderness.   SKIN:  No rash. No petechiae.      LABORATORY DATA:  Reviewed.      ASSESSMENT:   1.  An 83-year-old female with high-risk IgG kappa multiple myeloma.   2.  Mild thrombocytopenia.      PLAN:   1.  Patient overall is doing well from myeloma.  She is asymptomatic.  She is on Revlimid and dexamethasone.  She is tolerating it well.  She will continue on it.      Myeloma labs from  today are pending.  If there is progression of myeloma, we will plan on adding daratumumab or velcade.      2.  Patient will get Zometa.  She is not having any jaw or dental-related symptoms.  No pain or infection.      3.  She has mild thrombocytopenia.  This is from Revlimid.  We will continue to monitor CBC.  Advised her to go to the emergency room if she has bleeding from any site or easy bruising.      4.  I will see her in 4 weeks' time with labs. Patient advised to call us with any questions or concerns.     ADDENDUM: Labs reveal myeloma to be stable. Continue same treatment.        YVES PEREZ MD             D: 2019   T: 2019   MT: KIMI      Name:     DUGLAS CASTILLO   MRN:      [id number]        Account:      [id number]   :      1935           Visit Date:   2019      Document: [id number]        Again, thank you for allowing me to participate in the care of your patient.        Sincerely,        Yves Perez MD

## 2019-02-11 NOTE — PROGRESS NOTES
Oral Chemotherapy Monitoring Program.    Patient currently on Revlimid therapy.    I talked to the patient during the infusion appnt and she said that she has been taking Revlimid as prescribed and has not missed any doses. She also denies having any side effects.    Reviewed lab results from 2/11/19. Labs are within normal limits. Patient will be starting a new cycle today. Since patient's labs have been stable for a while, Dr Parry is okay with patient having labs once a month during Zometa infusions. The labs might not necessarily line up with the start of a cycles since her cycle length is 21 days and Zometa infusions are every 4 weeks.    Will follow up in 4 weeks.    Laci BonillaD.

## 2019-02-11 NOTE — PATIENT INSTRUCTIONS
Calcium/vitamin D supplements: Each tablet should have 500-600mg elemental calcium and 400 units of vitamin D. Take 1 tablet orally twice daily.

## 2019-02-11 NOTE — PROGRESS NOTES
Infusion Nursing Note:  Quiana Dunaway presents today for Zometa.    Patient seen by provider today: Yes: Dr. Parry   present during visit today: Not Applicable.    Note: Ok to proceed with zometa today per Dr. Parry. Verified with patient she is taking calcium and vitamin D as recommended.    Intravenous Access:  Labs drawn without difficulty.  Peripheral IV placed.    Treatment Conditions:  Lab Results   Component Value Date    HGB 13.3 02/11/2019     Lab Results   Component Value Date    WBC 5.0 02/11/2019      Lab Results   Component Value Date    ANEU 2.6 02/11/2019     Lab Results   Component Value Date     02/11/2019      Lab Results   Component Value Date     02/11/2019                   Lab Results   Component Value Date    POTASSIUM 3.5 02/11/2019        Lab Results   Component Value Date    CR 0.57 02/11/2019                   Lab Results   Component Value Date    HUMBERTO  Corrected calcium 8.5  9.06 02/11/2019 02/11/2019                Lab Results   Component Value Date    BILITOTAL 0.6 02/11/2019           Lab Results   Component Value Date    ALBUMIN 3.3 02/11/2019                    Lab Results   Component Value Date    ALT 18 02/11/2019           Lab Results   Component Value Date    AST 16 02/11/2019     Results reviewed, labs MET treatment parameters, ok to proceed with treatment.      Post Infusion Assessment:  Patient tolerated infusion without incident.  Blood return noted pre and post infusion.  Site patent and intact, free from redness, edema or discomfort.  No evidence of extravasations.  Access discontinued per protocol.    Discharge Plan:   Discharge instructions reviewed with: Patient.  Patient and/or family verbalized understanding of discharge instructions and all questions answered.  Copy of AVS given in clinic.  Patient will return 3/11 for next appointment.  Patient discharged in stable condition accompanied by: daughter-in-law.  Departure Mode:  Ambulatory.    Amanda Howe RN

## 2019-02-12 LAB
ALBUMIN SERPL ELPH-MCNC: 3.8 G/DL (ref 3.7–5.1)
ALPHA1 GLOB SERPL ELPH-MCNC: 0.3 G/DL (ref 0.2–0.4)
ALPHA2 GLOB SERPL ELPH-MCNC: 0.8 G/DL (ref 0.5–0.9)
B-GLOBULIN SERPL ELPH-MCNC: 0.7 G/DL (ref 0.6–1)
GAMMA GLOB SERPL ELPH-MCNC: 0.6 G/DL (ref 0.7–1.6)
IGG SERPL-MCNC: 616 MG/DL (ref 695–1620)
KAPPA LC UR-MCNC: 2.69 MG/DL (ref 0.33–1.94)
KAPPA LC/LAMBDA SER: 3.13 {RATIO} (ref 0.26–1.65)
LAMBDA LC SERPL-MCNC: 0.86 MG/DL (ref 0.57–2.63)
M PROTEIN SERPL ELPH-MCNC: 0.3 G/DL
PROT PATTERN SERPL ELPH-IMP: ABNORMAL

## 2019-02-12 NOTE — PROGRESS NOTES
Visit Date:   02/11/2019     HEMATOLOGY HISTORY: Mrs. Dunaway is a lady with multiple myeloma (IgG kappa). High risk, t(14;16).  1. On 05/01/2017, WBC of 3.4, hemoglobin of 10.2 and platelet of 154.  2. SPEP on 07/07/2017 revealed M-spike of 1.8.  3. Bone marrow biopsy on 07/12/2017 reveals kappa monotypic plasma cell population consistent with multiple myeloma.  Bone marrow is 70% cellular.  Plasma cell is 50-60%.     -There is gain of chromosome 1, 5, 9, 15, and 17.  There is translocation 14;16.   4.  On 07/18/2017:  -M-spike of 1.9.   -Immunofixation reveals monoclonal IgG kappa.    -IgG level of 2970.  IgA of 15 and IgM of 175.    -Kappa free light chain of 67.7.  Lambda free light chain of 1.42.  Ratio of kappa to lambda of 47.71.    -Beta 2 microglobulin of 3.4.   5. PET scan on 07/24/2017 reveals several focal areas of increased FDG uptake consistent with multiple myeloma.  There is probable epithelial cyst in tail of the pancreas.  No associated abnormal FDG activity.  Left thyroid cysts or nodules without any FDG activity.  There is a 0.3 cm left upper lobe lung nodule.   8.  Velcade, Revlimid and dexamethasone between on 08/14/2017 and 04/30/2018. Velcade stopped.   -Revlimid and dexamethasone continued.     SUBJECTIVE:  Ms. Dunaway is an 83-year-old female with high-risk IgG kappa multiple myeloma.  She is currently on Revlimid and dexamethasone.  She is tolerating it well.  Overall, she is doing well.  No headache.  No dizziness.  No chest pain.  No difficulty breathing.  No abdominal pain, nausea or vomiting.  Appetite overall has been good.  No urinary or bowel complaints.  No bleeding.  No fever, chills or night sweats.  No weight loss.      PHYSICAL EXAMINATION:   GENERAL:  She is alert and oriented x 3.   VITAL SIGNS:  Reviewed.  ECOG PS of 2.   FACE:  No swelling.     EYES:  No icterus.    THROAT:  No ulcer.   NECK:  Supple. No lymphadenopathy.  No tenderness.   LUNGS:  Good air entry  bilaterally.  No wheezing.   HEART:  Regular.   ABDOMEN:  Soft.  Nontender.  No mass.   EXTREMITIES:  No edema. No calf redness or tenderness.   SKIN:  No rash. No petechiae.      LABORATORY DATA:  Reviewed.      ASSESSMENT:   1.  An 83-year-old female with high-risk IgG kappa multiple myeloma.   2.  Mild thrombocytopenia.      PLAN:   1.  Patient overall is doing well from myeloma.  She is asymptomatic.  She is on Revlimid and dexamethasone.  She is tolerating it well.  She will continue on it.      Myeloma labs from today are pending.  If there is progression of myeloma, we will plan on adding daratumumab or velcade.      2.  Patient will get Zometa.  She is not having any jaw or dental-related symptoms.  No pain or infection.      3.  She has mild thrombocytopenia.  This is from Revlimid.  We will continue to monitor CBC.  Advised her to go to the emergency room if she has bleeding from any site or easy bruising.      4.  I will see her in 4 weeks' time with labs. Patient advised to call us with any questions or concerns.     ADDENDUM: Labs reveal myeloma to be stable. Continue same treatment.        YVES PEREZ MD             D: 2019   T: 2019   MT: KIMI      Name:     DUGLAS CASTILLO   MRN:      [id number]        Account:      [id number]   :      1935           Visit Date:   2019      Document: [id number]

## 2019-02-12 NOTE — RESULT ENCOUNTER NOTE
Dear Ms. Dunaway,    Blood tests reveal myeloma to be stable.    Please, call me with any questions.    Alex Parry

## 2019-02-21 ENCOUNTER — OFFICE VISIT (OUTPATIENT)
Dept: FAMILY MEDICINE | Facility: CLINIC | Age: 84
End: 2019-02-21
Payer: MEDICARE

## 2019-02-21 VITALS
HEIGHT: 62 IN | BODY MASS INDEX: 28.89 KG/M2 | DIASTOLIC BLOOD PRESSURE: 72 MMHG | TEMPERATURE: 98.4 F | HEART RATE: 80 BPM | WEIGHT: 157 LBS | SYSTOLIC BLOOD PRESSURE: 116 MMHG | OXYGEN SATURATION: 98 %

## 2019-02-21 DIAGNOSIS — I10 BENIGN ESSENTIAL HYPERTENSION: ICD-10-CM

## 2019-02-21 DIAGNOSIS — F41.1 GAD (GENERALIZED ANXIETY DISORDER): ICD-10-CM

## 2019-02-21 DIAGNOSIS — C90.00 MULTIPLE MYELOMA NOT HAVING ACHIEVED REMISSION (H): Primary | ICD-10-CM

## 2019-02-21 PROCEDURE — 99213 OFFICE O/P EST LOW 20 MIN: CPT | Performed by: INTERNAL MEDICINE

## 2019-02-21 ASSESSMENT — MIFFLIN-ST. JEOR: SCORE: 1125.4

## 2019-02-21 NOTE — PROGRESS NOTES
SUBJECTIVE:   Quiana Dunaway is a 83 year old female who presents to clinic today for the following health issues:      Medication Followup    Taking Medication as prescribed: yes    Side Effects:  None     Very pleasant 83-year-old female with multiple myeloma, anxiety, hypertension, hypokalemia.  She is a non-smoker.  She is followed by Dr. Parry from oncology for her myeloma which seems to be stable.  She is here to follow-up on her hypertension and anxiety.  She requires frequent visits for reassurance.  She states that her anxiety symptoms are under good control.  Her blood pressure has been under good control.  She has no new symptoms today.  She continues to follow-up with her oncologist as directed.  Unfortunately, her sister-in-law recently passed away.  This was unexpected.      Problem list and histories reviewed & adjusted, as indicated.  Additional history: as documented    Patient Active Problem List   Diagnosis     Benign essential hypertension     Pancytopenia (H)     Multiple myeloma not having achieved remission (H)     Benzodiazepine overdose, accidental or unintentional, initial encounter     Atrial fibrillation, unspecified type (H)     Hypokalemia     Pacemaker     ROBER (generalized anxiety disorder)     Past Surgical History:   Procedure Laterality Date     BONE MARROW BIOPSY, BONE SPECIMEN, NEEDLE/TROCAR N/A 7/12/2017    Procedure: BIOPSY BONE MARROW;  BONE MARROW BIOPSY ;  Surgeon: Grace Vela MD;  Location:  GI     IMPLANT PACEMAKER  03/2017    AV block       Social History     Tobacco Use     Smoking status: Never Smoker     Smokeless tobacco: Never Used   Substance Use Topics     Alcohol use: No     Alcohol/week: 0.0 oz     Family History   Problem Relation Age of Onset     Unknown/Adopted Mother      Unknown/Adopted Father          Current Outpatient Medications   Medication Sig Dispense Refill     acyclovir (ZOVIRAX) 400 MG tablet Take 1 tablet (400 mg) by mouth 2  "times daily Viral Prophylaxis. 60 tablet 5     aspirin 81 MG chewable tablet Take 81 mg by mouth daily        dexamethasone (DECADRON) 4 MG tablet Take 20 mg by mouth once a week Once a week with food on Days 1,8,15.. 20 tablet 0     escitalopram (LEXAPRO) 5 MG tablet TAKE 1 TABLET BY MOUTH DAILY 90 tablet 0     LENalidomide (REVLIMID) 10 MG CAPS capsule CHEMO Take 1 capsule (10 mg) by mouth daily for 14 days Take on Days 1 through 14 of 21-day cycle. 14 capsule 0     lisinopril (PRINIVIL/ZESTRIL) 10 MG tablet Take 1 tablet (10 mg) by mouth daily 90 tablet 2     Loperamide HCl (IMODIUM A-D PO) Take by mouth as needed       LORazepam (ATIVAN) 0.5 MG tablet Take 1 tablet (0.5 mg) by mouth every 8 hours as needed (Anxiety, Nausea/Vomiting or Sleep) 30 tablet 3     mirabegron (MYRBETRIQ) 25 MG 24 hr tablet Take 1 tablet (25 mg) by mouth daily 90 tablet 3     nitroglycerin (NITROSTAT) 0.4 MG sublingual tablet For chest pain place 1 tablet under the tongue every 5 minutes for 3 doses. If symptoms persist 5 minutes after 1st dose call 911. 25 tablet 0     order for DME Wheelchair. 1 Units 0     order for DME Dispense one 4 wheeled walker with hand brakes and seat 1 Units 0     prochlorperazine (COMPAZINE) 10 MG tablet Take 1 tablet (10 mg) by mouth every 6 hours as needed (Nausea/Vomiting) 30 tablet 3     Zoledronic Acid (ZOMETA IV)        No Known Allergies    Reviewed and updated as needed this visit by clinical staff  Tobacco  Allergies  Meds       Reviewed and updated as needed this visit by Provider         ROS:  Constitutional, HEENT, cardiovascular, pulmonary, gi and gu systems are negative, except as otherwise noted.    OBJECTIVE:     /72 (BP Location: Right arm, Patient Position: Sitting, Cuff Size: Adult Regular)   Pulse 80   Temp 98.4  F (36.9  C) (Oral)   Ht 1.575 m (5' 2\")   Wt 71.2 kg (157 lb)   SpO2 98%   BMI 28.72 kg/m    Body mass index is 28.72 kg/m .  General: This is a well-appearing, " unchanged appearing elderly female in no acute distress.  Cardiovascular: The heart has a regular rate and rhythm.  Pulmonary: The lungs are clear to auscultation bilaterally, breathing is not labored.  Abdomen: Soft, not distended, nontender, bowel sounds present, no masses, no organomegaly, there is a small umbilical hernia noted.  Extremities: Well perfused and without edema clubbing or cyanosis.  Neurological: Alert and oriented to person place and time, cranial nerves II to XII appear grossly intact, normal gait.  Mental status: Appropriate mood and affect, well-groomed, normal speech.    Diagnostic Test Results:  Results for orders placed or performed in visit on 02/11/19   Comprehensive metabolic panel   Result Value Ref Range    Sodium 141 133 - 144 mmol/L    Potassium 3.5 3.4 - 5.3 mmol/L    Chloride 109 94 - 109 mmol/L    Carbon Dioxide 26 20 - 32 mmol/L    Anion Gap 6 3 - 14 mmol/L    Glucose 129 (H) 70 - 99 mg/dL    Urea Nitrogen 13 7 - 30 mg/dL    Creatinine 0.57 0.52 - 1.04 mg/dL    GFR Estimate 86 >60 mL/min/[1.73_m2]    GFR Estimate If Black >90 >60 mL/min/[1.73_m2]    Calcium 8.5 8.5 - 10.1 mg/dL    Bilirubin Total 0.6 0.2 - 1.3 mg/dL    Albumin 3.3 (L) 3.4 - 5.0 g/dL    Protein Total 6.4 (L) 6.8 - 8.8 g/dL    Alkaline Phosphatase 82 40 - 150 U/L    ALT 18 0 - 50 U/L    AST 16 0 - 45 U/L   CBC with platelets differential   Result Value Ref Range    WBC 5.0 4.0 - 11.0 10e9/L    RBC Count 4.39 3.8 - 5.2 10e12/L    Hemoglobin 13.3 11.7 - 15.7 g/dL    Hematocrit 38.9 35.0 - 47.0 %    MCV 89 78 - 100 fl    MCH 30.3 26.5 - 33.0 pg    MCHC 34.2 31.5 - 36.5 g/dL    RDW 13.3 10.0 - 15.0 %    Platelet Count 129 (L) 150 - 450 10e9/L    Diff Method Automated Method     % Neutrophils 52.4 %    % Lymphocytes 31.3 %    % Monocytes 13.5 %    % Eosinophils 1.6 %    % Basophils 0.8 %    % Immature Granulocytes 0.4 %    Nucleated RBCs 0 0 /100    Absolute Neutrophil 2.6 1.6 - 8.3 10e9/L    Absolute Lymphocytes 1.6 0.8  - 5.3 10e9/L    Absolute Monocytes 0.7 0.0 - 1.3 10e9/L    Absolute Eosinophils 0.1 0.0 - 0.7 10e9/L    Absolute Basophils 0.0 0.0 - 0.2 10e9/L    Abs Immature Granulocytes 0.0 0 - 0.4 10e9/L    Absolute Nucleated RBC 0.0    Protein electrophoresis   Result Value Ref Range    Albumin Fraction 3.8 3.7 - 5.1 g/dL    Alpha 1 Fraction 0.3 0.2 - 0.4 g/dL    Alpha 2 Fraction 0.8 0.5 - 0.9 g/dL    Beta Fraction 0.7 0.6 - 1.0 g/dL    Gamma Fraction 0.6 (L) 0.7 - 1.6 g/dL    Monoclonal Peak 0.3 (H) 0.0 g/dL    ELP Interpretation:       Small monoclonal protein (about 0.3 g/dL) seen in the gamma fraction.  Previously   characterized in our laboratory on 07/18/17 as a monoclonal IgG immunoglobulin of kappa   light chain type. Pathologic significance requires clinical correlation.  LOURDES Stout M.D., Ph.D., Pathologist ().      IgG   Result Value Ref Range     (L) 695 - 1,620 mg/dL   Kappa and lambda light chain   Result Value Ref Range    Kappa Free Lt Chain 2.69 (H) 0.33 - 1.94 mg/dL    Lambda Free Lt Chain 0.86 0.57 - 2.63 mg/dL    Kappa Lambda Ratio 3.13 (H) 0.26 - 1.65       ASSESSMENT/PLAN:         ICD-10-CM    1. Multiple myeloma not having achieved remission (H) C90.00    2. ROBER (generalized anxiety disorder) F41.1    3. Benign essential hypertension I10      Her medical conditions appear to be stable.  She does have a umbilical hernia, we discussed the possibility of consulting with a hernia surgeon to discuss repair.  Since she is having no symptoms from this hernia, she would prefer not to have further medical intervention for this at this juncture, we discussed red flag symptoms, if the hernia remains asymptomatic, we will continue to follow it for now.          César Chung MD  [facility]

## 2019-02-25 DIAGNOSIS — C90.00 MULTIPLE MYELOMA NOT HAVING ACHIEVED REMISSION (H): Primary | ICD-10-CM

## 2019-02-25 RX ORDER — LENALIDOMIDE 10 MG/1
10 CAPSULE ORAL DAILY
Qty: 14 CAPSULE | Refills: 0 | Status: SHIPPED | OUTPATIENT
Start: 2019-02-25 | End: 2019-05-01

## 2019-02-25 RX ORDER — DEXAMETHASONE 4 MG/1
20 TABLET ORAL WEEKLY
Qty: 20 TABLET | Refills: 0 | Status: SHIPPED | OUTPATIENT
Start: 2019-02-25 | End: 2019-05-01

## 2019-03-11 ENCOUNTER — DOCUMENTATION ONLY (OUTPATIENT)
Dept: PHARMACY | Facility: CLINIC | Age: 84
End: 2019-03-11

## 2019-03-11 ENCOUNTER — ONCOLOGY VISIT (OUTPATIENT)
Dept: ONCOLOGY | Facility: CLINIC | Age: 84
End: 2019-03-11
Attending: INTERNAL MEDICINE
Payer: MEDICARE

## 2019-03-11 ENCOUNTER — INFUSION THERAPY VISIT (OUTPATIENT)
Dept: INFUSION THERAPY | Facility: CLINIC | Age: 84
End: 2019-03-11
Attending: INTERNAL MEDICINE
Payer: MEDICARE

## 2019-03-11 ENCOUNTER — HOSPITAL ENCOUNTER (OUTPATIENT)
Facility: CLINIC | Age: 84
Setting detail: SPECIMEN
Discharge: HOME | End: 2019-03-11
Attending: INTERNAL MEDICINE | Admitting: INTERNAL MEDICINE
Payer: MEDICARE

## 2019-03-11 VITALS
SYSTOLIC BLOOD PRESSURE: 135 MMHG | DIASTOLIC BLOOD PRESSURE: 74 MMHG | HEART RATE: 85 BPM | TEMPERATURE: 98.2 F | RESPIRATION RATE: 18 BRPM

## 2019-03-11 DIAGNOSIS — C90.00 MULTIPLE MYELOMA NOT HAVING ACHIEVED REMISSION (H): Primary | ICD-10-CM

## 2019-03-11 PROBLEM — D61.818 PANCYTOPENIA (H): Status: ACTIVE | Noted: 2017-06-28

## 2019-03-11 PROBLEM — D61.818 PANCYTOPENIA (H): Status: RESOLVED | Noted: 2017-06-28 | Resolved: 2019-03-11

## 2019-03-11 LAB
ALBUMIN SERPL-MCNC: 3.6 G/DL (ref 3.4–5)
ALP SERPL-CCNC: 85 U/L (ref 40–150)
ALT SERPL W P-5'-P-CCNC: 18 U/L (ref 0–50)
ANION GAP SERPL CALCULATED.3IONS-SCNC: 9 MMOL/L (ref 3–14)
AST SERPL W P-5'-P-CCNC: 14 U/L (ref 0–45)
BASOPHILS # BLD AUTO: 0 10E9/L (ref 0–0.2)
BASOPHILS NFR BLD AUTO: 0.2 %
BILIRUB SERPL-MCNC: 0.4 MG/DL (ref 0.2–1.3)
BUN SERPL-MCNC: 25 MG/DL (ref 7–30)
CALCIUM SERPL-MCNC: 9.2 MG/DL (ref 8.5–10.1)
CHLORIDE SERPL-SCNC: 110 MMOL/L (ref 94–109)
CO2 SERPL-SCNC: 21 MMOL/L (ref 20–32)
CREAT SERPL-MCNC: 0.65 MG/DL (ref 0.52–1.04)
DIFFERENTIAL METHOD BLD: ABNORMAL
EOSINOPHIL # BLD AUTO: 0 10E9/L (ref 0–0.7)
EOSINOPHIL NFR BLD AUTO: 0 %
ERYTHROCYTE [DISTWIDTH] IN BLOOD BY AUTOMATED COUNT: 13.4 % (ref 10–15)
GFR SERPL CREATININE-BSD FRML MDRD: 82 ML/MIN/{1.73_M2}
GLUCOSE SERPL-MCNC: 165 MG/DL (ref 70–99)
HCT VFR BLD AUTO: 39.6 % (ref 35–47)
HGB BLD-MCNC: 13.6 G/DL (ref 11.7–15.7)
IMM GRANULOCYTES # BLD: 0.1 10E9/L (ref 0–0.4)
IMM GRANULOCYTES NFR BLD: 1.1 %
LYMPHOCYTES # BLD AUTO: 0.6 10E9/L (ref 0.8–5.3)
LYMPHOCYTES NFR BLD AUTO: 8.8 %
MCH RBC QN AUTO: 30.2 PG (ref 26.5–33)
MCHC RBC AUTO-ENTMCNC: 34.3 G/DL (ref 31.5–36.5)
MCV RBC AUTO: 88 FL (ref 78–100)
MONOCYTES # BLD AUTO: 0 10E9/L (ref 0–1.3)
MONOCYTES NFR BLD AUTO: 0.6 %
NEUTROPHILS # BLD AUTO: 5.8 10E9/L (ref 1.6–8.3)
NEUTROPHILS NFR BLD AUTO: 89.3 %
NRBC # BLD AUTO: 0 10*3/UL
NRBC BLD AUTO-RTO: 0 /100
PLATELET # BLD AUTO: 185 10E9/L (ref 150–450)
POTASSIUM SERPL-SCNC: 3.9 MMOL/L (ref 3.4–5.3)
PROT SERPL-MCNC: 6.8 G/DL (ref 6.8–8.8)
RBC # BLD AUTO: 4.5 10E12/L (ref 3.8–5.2)
SODIUM SERPL-SCNC: 140 MMOL/L (ref 133–144)
WBC # BLD AUTO: 6.5 10E9/L (ref 4–11)

## 2019-03-11 PROCEDURE — 96374 THER/PROPH/DIAG INJ IV PUSH: CPT

## 2019-03-11 PROCEDURE — 99213 OFFICE O/P EST LOW 20 MIN: CPT | Performed by: INTERNAL MEDICINE

## 2019-03-11 PROCEDURE — 85025 COMPLETE CBC W/AUTO DIFF WBC: CPT | Performed by: INTERNAL MEDICINE

## 2019-03-11 PROCEDURE — 36415 COLL VENOUS BLD VENIPUNCTURE: CPT

## 2019-03-11 PROCEDURE — 25000128 H RX IP 250 OP 636: Performed by: NURSE PRACTITIONER

## 2019-03-11 PROCEDURE — 96372 THER/PROPH/DIAG INJ SC/IM: CPT

## 2019-03-11 PROCEDURE — 80053 COMPREHEN METABOLIC PANEL: CPT | Performed by: INTERNAL MEDICINE

## 2019-03-11 RX ORDER — ZOLEDRONIC ACID 0.04 MG/ML
4 INJECTION, SOLUTION INTRAVENOUS ONCE
Status: CANCELLED | OUTPATIENT
Start: 2019-03-11 | End: 2019-03-11

## 2019-03-11 RX ORDER — ZOLEDRONIC ACID 0.04 MG/ML
4 INJECTION, SOLUTION INTRAVENOUS ONCE
Status: COMPLETED | OUTPATIENT
Start: 2019-03-11 | End: 2019-03-11

## 2019-03-11 RX ADMIN — ZOLEDRONIC ACID 4 MG: 0.04 INJECTION, SOLUTION INTRAVENOUS at 15:14

## 2019-03-11 ASSESSMENT — PAIN SCALES - GENERAL: PAINLEVEL: NO PAIN (0)

## 2019-03-11 NOTE — PROGRESS NOTES
Oral Chemotherapy Monitoring Program.    Patient currently on revlimid therapy. Current 21 day cycle started 3/4.    Reviewed labs from 3/11/19. ALC trending low, continue to monitor. No other concerning abnormalities.    Questions answered to patient's satisfaction.    Will follow up in 4 weeks with repeat labs.     Javier Yanez Pharmacy Student on 3/11/2019 at 2:29 PM    Laci Buruchara PharmD.

## 2019-03-11 NOTE — Clinical Note
3/11/2019         RE: Quiana Dunaway  [address] [facility] [address]        Dear Colleague,    Thank you for referring your patient, Quiana Dunaway, to the [facility]. Please see a copy of my visit note below.    Visit Date:   03/11/2019     HEMATOLOGY HISTORY: Mrs. Dunaway is a lady with multiple myeloma (IgG kappa). High risk, t(14;16).  1. On 05/01/2017, WBC of 3.4, hemoglobin of 10.2 and platelet of 154.  2. SPEP on 07/07/2017 revealed M-spike of 1.8.  3. Bone marrow biopsy on 07/12/2017 reveals kappa monotypic plasma cell population consistent with multiple myeloma.  Bone marrow is 70% cellular.  Plasma cell is 50-60%.     -There is gain of chromosome 1, 5, 9, 15, and 17.  There is translocation 14;16.   4.  On 07/18/2017:  -M-spike of 1.9.   -Immunofixation reveals monoclonal IgG kappa.    -IgG level of 2970.  IgA of 15 and IgM of 175.    -Kappa free light chain of 67.7.  Lambda free light chain of 1.42.  Ratio of kappa to lambda of 47.71.    -Beta 2 microglobulin of 3.4.   5. PET scan on 07/24/2017 reveals several focal areas of increased FDG uptake consistent with multiple myeloma.  There is probable epithelial cyst in tail of the pancreas.  No associated abnormal FDG activity.  Left thyroid cysts or nodules without any FDG activity.  There is a 0.3 cm left upper lobe lung nodule.   8.  Velcade, Revlimid and dexamethasone between on 08/14/2017 and 04/30/2018. Velcade stopped.   -Revlimid and dexamethasone continued.     SUBJECTIVE:  Ms. Dunaway is an 83-year-old female with high-risk multiple myeloma.  She is on Revlimid and dexamethasone.  She is tolerating it well.      Overall, she is doing good.  No headache.  No dizziness.  No chest pain.  No difficulty breathing.  No abdominal pain, nausea or vomiting.  Appetite is good.  No urinary or bowel complaints.  No bleeding.  No fever, chills or night sweats.      PHYSICAL EXAMINATION:   GENERAL:  She is alert and oriented x  3.   VITAL SIGNS:  Reviewed.  ECOG PS of 2.   FACE:  No swelling.     EYES:  No icterus.    THROAT:  No ulcer.   NECK:  Supple. No lymphadenopathy.  No tenderness.   LUNGS:  Good air entry bilaterally.  No wheezing.   HEART:  Regular.   ABDOMEN:  Soft.  Nontender.  No mass.   EXTREMITIES:  No edema. No calf redness or tenderness.   SKIN:  No rash. No petechiae.    LABORATORY DATA:  Reviewed.      ASSESSMENT: An 83-year-old female with high-risk multiple myeloma.  Myeloma is stable.      PLAN:   1. Patient overall is doing well from myeloma.  Labs in 2019 revealed myeloma to be stable. She is on Revlimid and dexamethasone.  She is tolerating it well.  She will continue on it.  Side effects reviewed.   1.  For bone health,  she is on Zometa, which will be continued.  After she is done with 2 years of Zometa, will switch it to every 3 months.   3.  I will see her in 2 months' time.  In between, she will see our nurse practitioner.  Advised her to see a physician sooner if she has worsening weakness, chest pain, bone pain, shortness of breath, recurrent infection or any other concerns.         YVES PEREZ MD             D: 2019   T: 2019   MT: AS      Name:     DUGLAS CASTILLO   MRN:      [id number]        Account:      [id number]   :      1935           Visit Date:   2019      Document: [id number]        Again, thank you for allowing me to participate in the care of your patient.        Sincerely,        Yves Perez MD

## 2019-03-11 NOTE — PROGRESS NOTES
Infusion Nursing Note:  Quiana Dunaway presents today for zometa.    Patient seen by provider today: No   present during visit today: Not Applicable.    Note: N/A.    Intravenous Access:  Labs drawn without difficulty.  Peripheral IV placed.    Treatment Conditions:  Lab Results   Component Value Date    HGB 13.6 03/11/2019     Lab Results   Component Value Date    WBC 6.5 03/11/2019      Lab Results   Component Value Date    ANEU 5.8 03/11/2019     Lab Results   Component Value Date     03/11/2019      Lab Results   Component Value Date     03/11/2019                   Lab Results   Component Value Date    POTASSIUM 3.9 03/11/2019           No results found for: MAG         Lab Results   Component Value Date    CR 0.65 03/11/2019                   Lab Results   Component Value Date    HUMBERTO 9.2 03/11/2019                Lab Results   Component Value Date    BILITOTAL 0.4 03/11/2019           Lab Results   Component Value Date    ALBUMIN 3.6 03/11/2019                    Lab Results   Component Value Date    ALT 18 03/11/2019           Lab Results   Component Value Date    AST 14 03/11/2019       Results reviewed, labs MET treatment parameters, ok to proceed with treatment.      Post Infusion Assessment:  Patient tolerated infusion without incident.  Blood return noted pre and post infusion.  Site patent and intact, free from redness, edema or discomfort.  No evidence of extravasations.  Access discontinued per protocol.    Discharge Plan:   Discharge instructions reviewed with: Patient.  Patient and/or family verbalized understanding of discharge instructions and all questions answered.  Patient discharged in stable condition accompanied by: self and daughter.  Departure Mode: Ambulatory.    Jessica Shetty RN

## 2019-03-11 NOTE — PATIENT INSTRUCTIONS
Continue revlimid and dexamethasone.  Continue zometa.  Follow up with Juaquin Hammond in 1 month with labs (labs as per treatment plan).  See me in 2 months.    Patient went back to [facility] with Jessica

## 2019-03-12 NOTE — PROGRESS NOTES
Visit Date:   03/11/2019     HEMATOLOGY HISTORY: Mrs. Dunaway is a lady with multiple myeloma (IgG kappa). High risk, t(14;16).  1. On 05/01/2017, WBC of 3.4, hemoglobin of 10.2 and platelet of 154.  2. SPEP on 07/07/2017 revealed M-spike of 1.8.  3. Bone marrow biopsy on 07/12/2017 reveals kappa monotypic plasma cell population consistent with multiple myeloma.  Bone marrow is 70% cellular.  Plasma cell is 50-60%.     -There is gain of chromosome 1, 5, 9, 15, and 17.  There is translocation 14;16.   4.  On 07/18/2017:  -M-spike of 1.9.   -Immunofixation reveals monoclonal IgG kappa.    -IgG level of 2970.  IgA of 15 and IgM of 175.    -Kappa free light chain of 67.7.  Lambda free light chain of 1.42.  Ratio of kappa to lambda of 47.71.    -Beta 2 microglobulin of 3.4.   5. PET scan on 07/24/2017 reveals several focal areas of increased FDG uptake consistent with multiple myeloma.  There is probable epithelial cyst in tail of the pancreas.  No associated abnormal FDG activity.  Left thyroid cysts or nodules without any FDG activity.  There is a 0.3 cm left upper lobe lung nodule.   8.  Velcade, Revlimid and dexamethasone between on 08/14/2017 and 04/30/2018. Velcade stopped.   -Revlimid and dexamethasone continued.     SUBJECTIVE:  Ms. Dunaway is an 83-year-old female with high-risk multiple myeloma.  She is on Revlimid and dexamethasone.  She is tolerating it well.      Overall, she is doing good.  No headache.  No dizziness.  No chest pain.  No difficulty breathing.  No abdominal pain, nausea or vomiting.  Appetite is good.  No urinary or bowel complaints.  No bleeding.  No fever, chills or night sweats.      PHYSICAL EXAMINATION:   GENERAL:  She is alert and oriented x 3.   VITAL SIGNS:  Reviewed.  ECOG PS of 2.   FACE:  No swelling.     EYES:  No icterus.    THROAT:  No ulcer.   NECK:  Supple. No lymphadenopathy.  No tenderness.   LUNGS:  Good air entry bilaterally.  No wheezing.   HEART:  Regular.   ABDOMEN:   Soft.  Nontender.  No mass.   EXTREMITIES:  No edema. No calf redness or tenderness.   SKIN:  No rash. No petechiae.    LABORATORY DATA:  Reviewed.      ASSESSMENT: An 83-year-old female with high-risk multiple myeloma.  Myeloma is stable.      PLAN:   1. Patient overall is doing well from myeloma.  Labs in 2019 revealed myeloma to be stable. She is on Revlimid and dexamethasone.  She is tolerating it well.  She will continue on it.  Side effects reviewed.   1.  For bone health,  she is on Zometa, which will be continued.  After she is done with 2 years of Zometa, will switch it to every 3 months.   3.  I will see her in 2 months' time.  In between, she will see our nurse practitioner.  Advised her to see a physician sooner if she has worsening weakness, chest pain, bone pain, shortness of breath, recurrent infection or any other concerns.         YVES PEREZ MD             D: 2019   T: 2019   MT: AS      Name:     DUGLAS CASTILLO   MRN:      [id number]        Account:      [id number]   :      1935           Visit Date:   2019      Document: [id number]

## 2019-03-18 DIAGNOSIS — C90.00 MULTIPLE MYELOMA NOT HAVING ACHIEVED REMISSION (H): Primary | ICD-10-CM

## 2019-03-18 RX ORDER — LENALIDOMIDE 10 MG/1
10 CAPSULE ORAL DAILY
Qty: 14 CAPSULE | Refills: 0 | Status: SHIPPED | OUTPATIENT
Start: 2019-03-18 | End: 2019-05-01

## 2019-03-18 RX ORDER — DEXAMETHASONE 4 MG/1
20 TABLET ORAL WEEKLY
Qty: 20 TABLET | Refills: 0 | Status: SHIPPED | OUTPATIENT
Start: 2019-03-18 | End: 2019-05-01

## 2019-04-09 DIAGNOSIS — C90.00 MULTIPLE MYELOMA NOT HAVING ACHIEVED REMISSION (H): Primary | ICD-10-CM

## 2019-04-09 RX ORDER — ZOLEDRONIC ACID 0.04 MG/ML
4 INJECTION, SOLUTION INTRAVENOUS ONCE
Status: CANCELLED | OUTPATIENT
Start: 2019-04-09

## 2019-04-10 DIAGNOSIS — C90.00 MULTIPLE MYELOMA NOT HAVING ACHIEVED REMISSION (H): ICD-10-CM

## 2019-04-10 RX ORDER — LENALIDOMIDE 10 MG/1
10 CAPSULE ORAL DAILY
Qty: 14 CAPSULE | Refills: 0 | Status: SHIPPED | OUTPATIENT
Start: 2019-04-10 | End: 2019-05-01

## 2019-04-10 RX ORDER — DEXAMETHASONE 4 MG/1
20 TABLET ORAL WEEKLY
Qty: 20 TABLET | Refills: 0 | Status: SHIPPED | OUTPATIENT
Start: 2019-04-10 | End: 2019-05-01

## 2019-04-10 RX ORDER — LORAZEPAM 0.5 MG/1
0.5 TABLET ORAL EVERY 8 HOURS PRN
Qty: 30 TABLET | Refills: 3 | Status: SHIPPED | OUTPATIENT
Start: 2019-04-10 | End: 2019-12-03

## 2019-04-10 RX ORDER — ACYCLOVIR 400 MG/1
400 TABLET ORAL 2 TIMES DAILY
Qty: 60 TABLET | Refills: 5 | Status: SHIPPED | OUTPATIENT
Start: 2019-04-10 | End: 2019-09-26

## 2019-04-11 ENCOUNTER — DOCUMENTATION ONLY (OUTPATIENT)
Dept: PHARMACY | Facility: CLINIC | Age: 84
End: 2019-04-11

## 2019-04-11 ENCOUNTER — INFUSION THERAPY VISIT (OUTPATIENT)
Dept: INFUSION THERAPY | Facility: CLINIC | Age: 84
End: 2019-04-11
Attending: INTERNAL MEDICINE
Payer: MEDICARE

## 2019-04-11 ENCOUNTER — ONCOLOGY VISIT (OUTPATIENT)
Dept: ONCOLOGY | Facility: CLINIC | Age: 84
End: 2019-04-11
Attending: NURSE PRACTITIONER
Payer: MEDICARE

## 2019-04-11 ENCOUNTER — HOSPITAL ENCOUNTER (OUTPATIENT)
Facility: CLINIC | Age: 84
Setting detail: SPECIMEN
Discharge: HOME | End: 2019-04-11
Attending: INTERNAL MEDICINE | Admitting: INTERNAL MEDICINE
Payer: MEDICARE

## 2019-04-11 VITALS
RESPIRATION RATE: 16 BRPM | TEMPERATURE: 98.4 F | DIASTOLIC BLOOD PRESSURE: 75 MMHG | HEART RATE: 71 BPM | SYSTOLIC BLOOD PRESSURE: 119 MMHG

## 2019-04-11 VITALS
DIASTOLIC BLOOD PRESSURE: 75 MMHG | BODY MASS INDEX: 28.72 KG/M2 | HEART RATE: 71 BPM | TEMPERATURE: 98.4 F | HEIGHT: 62 IN | SYSTOLIC BLOOD PRESSURE: 119 MMHG | OXYGEN SATURATION: 98 % | RESPIRATION RATE: 16 BRPM

## 2019-04-11 DIAGNOSIS — C90.00 MULTIPLE MYELOMA NOT HAVING ACHIEVED REMISSION (H): Primary | ICD-10-CM

## 2019-04-11 LAB
ALBUMIN SERPL-MCNC: 3.5 G/DL (ref 3.4–5)
ALP SERPL-CCNC: 74 U/L (ref 40–150)
ALT SERPL W P-5'-P-CCNC: 20 U/L (ref 0–50)
ANION GAP SERPL CALCULATED.3IONS-SCNC: 7 MMOL/L (ref 3–14)
AST SERPL W P-5'-P-CCNC: 14 U/L (ref 0–45)
BASOPHILS # BLD AUTO: 0 10E9/L (ref 0–0.2)
BASOPHILS NFR BLD AUTO: 0.6 %
BILIRUB SERPL-MCNC: 0.7 MG/DL (ref 0.2–1.3)
BUN SERPL-MCNC: 18 MG/DL (ref 7–30)
CALCIUM SERPL-MCNC: 8.7 MG/DL (ref 8.5–10.1)
CHLORIDE SERPL-SCNC: 109 MMOL/L (ref 94–109)
CO2 SERPL-SCNC: 25 MMOL/L (ref 20–32)
CREAT SERPL-MCNC: 0.64 MG/DL (ref 0.52–1.04)
DIFFERENTIAL METHOD BLD: NORMAL
EOSINOPHIL # BLD AUTO: 0.1 10E9/L (ref 0–0.7)
EOSINOPHIL NFR BLD AUTO: 1.8 %
ERYTHROCYTE [DISTWIDTH] IN BLOOD BY AUTOMATED COUNT: 13.3 % (ref 10–15)
GFR SERPL CREATININE-BSD FRML MDRD: 82 ML/MIN/{1.73_M2}
GLUCOSE SERPL-MCNC: 87 MG/DL (ref 70–99)
HCT VFR BLD AUTO: 39.6 % (ref 35–47)
HGB BLD-MCNC: 13.6 G/DL (ref 11.7–15.7)
IMM GRANULOCYTES # BLD: 0 10E9/L (ref 0–0.4)
IMM GRANULOCYTES NFR BLD: 0.2 %
LYMPHOCYTES # BLD AUTO: 1.6 10E9/L (ref 0.8–5.3)
LYMPHOCYTES NFR BLD AUTO: 31.7 %
MCH RBC QN AUTO: 30.8 PG (ref 26.5–33)
MCHC RBC AUTO-ENTMCNC: 34.3 G/DL (ref 31.5–36.5)
MCV RBC AUTO: 90 FL (ref 78–100)
MONOCYTES # BLD AUTO: 0.8 10E9/L (ref 0–1.3)
MONOCYTES NFR BLD AUTO: 17 %
NEUTROPHILS # BLD AUTO: 2.4 10E9/L (ref 1.6–8.3)
NEUTROPHILS NFR BLD AUTO: 48.7 %
NRBC # BLD AUTO: 0 10*3/UL
NRBC BLD AUTO-RTO: 0 /100
PLATELET # BLD AUTO: 170 10E9/L (ref 150–450)
POTASSIUM SERPL-SCNC: 4 MMOL/L (ref 3.4–5.3)
PROT SERPL-MCNC: 6.4 G/DL (ref 6.8–8.8)
RBC # BLD AUTO: 4.42 10E12/L (ref 3.8–5.2)
SODIUM SERPL-SCNC: 141 MMOL/L (ref 133–144)
WBC # BLD AUTO: 5 10E9/L (ref 4–11)

## 2019-04-11 PROCEDURE — 83883 ASSAY NEPHELOMETRY NOT SPEC: CPT | Performed by: NURSE PRACTITIONER

## 2019-04-11 PROCEDURE — 85025 COMPLETE CBC W/AUTO DIFF WBC: CPT | Performed by: INTERNAL MEDICINE

## 2019-04-11 PROCEDURE — 82784 ASSAY IGA/IGD/IGG/IGM EACH: CPT | Performed by: NURSE PRACTITIONER

## 2019-04-11 PROCEDURE — 00000402 ZZHCL STATISTIC TOTAL PROTEIN: Performed by: NURSE PRACTITIONER

## 2019-04-11 PROCEDURE — 99214 OFFICE O/P EST MOD 30 MIN: CPT | Performed by: NURSE PRACTITIONER

## 2019-04-11 PROCEDURE — 25000128 H RX IP 250 OP 636: Performed by: INTERNAL MEDICINE

## 2019-04-11 PROCEDURE — 84165 PROTEIN E-PHORESIS SERUM: CPT | Performed by: NURSE PRACTITIONER

## 2019-04-11 PROCEDURE — 96365 THER/PROPH/DIAG IV INF INIT: CPT

## 2019-04-11 PROCEDURE — 80053 COMPREHEN METABOLIC PANEL: CPT | Performed by: INTERNAL MEDICINE

## 2019-04-11 RX ORDER — ZOLEDRONIC ACID 0.04 MG/ML
4 INJECTION, SOLUTION INTRAVENOUS ONCE
Status: COMPLETED | OUTPATIENT
Start: 2019-04-11 | End: 2019-04-11

## 2019-04-11 RX ADMIN — ZOLEDRONIC ACID 4 MG: 0.04 INJECTION, SOLUTION INTRAVENOUS at 15:06

## 2019-04-11 ASSESSMENT — PAIN SCALES - GENERAL
PAINLEVEL: NO PAIN (0)
PAINLEVEL: NO PAIN (0)

## 2019-04-11 NOTE — PROGRESS NOTES
"Oncology Rooming Note    April 11, 2019 2:24 PM   Quiana Dunaway is a 83 year old female who presents for:    Chief Complaint   Patient presents with     Oncology Clinic Visit     Initial Vitals: /75   Pulse 71   Temp 98.4  F (36.9  C) (Oral)   Resp 16   Ht 1.575 m (5' 2\")   SpO2 98%   BMI 28.72 kg/m   Estimated body mass index is 28.72 kg/m  as calculated from the following:    Height as of this encounter: 1.575 m (5' 2\").    Weight as of 2/21/19: 71.2 kg (157 lb). Body surface area is 1.76 meters squared.  No Pain (0) Comment: Data Unavailable   No LMP recorded. Patient is postmenopausal.  Allergies reviewed: Yes  Medications reviewed: Yes    Medications: Medication refills not needed today.  Pharmacy name entered into [facility]:    [facility] - [id number] [place] [id number]    Clinical concerns: no      Lauren Arredondo CMA            "

## 2019-04-11 NOTE — LETTER
"    4/11/2019         RE: Quiana Dunaway  [address] [place] [facility] [id number]        Dear Colleague,    Thank you for referring your patient, Quiana Dunaway, to the [facility]. Please see a copy of my visit note below.    Oncology Rooming Note    April 11, 2019 2:24 PM   Quiana Dunaway is a 83 year old female who presents for:    Chief Complaint   Patient presents with     Oncology Clinic Visit     Initial Vitals: /75   Pulse 71   Temp 98.4  F (36.9  C) (Oral)   Resp 16   Ht 1.575 m (5' 2\")   SpO2 98%   BMI 28.72 kg/m    Estimated body mass index is 28.72 kg/m  as calculated from the following:    Height as of this encounter: 1.575 m (5' 2\").    Weight as of 2/21/19: 71.2 kg (157 lb). Body surface area is 1.76 meters squared.  No Pain (0) Comment: Data Unavailable   No LMP recorded. Patient is postmenopausal.  Allergies reviewed: Yes  Medications reviewed: Yes    Medications: Medication refills not needed today.  Pharmacy name entered into [facility]:    [facility] [place], [place] - [address] [address]    Clinical concerns: no      Laruen Arredondo [facility]              Oncology/Hematology Visit Note  Apr 11, 2019    Reason for Visit: follow up of multiple myeloma  Currently on Revlimid and dexamethasone  With Zometa.    Interval History:  The patient has been tolerating Revlimid and dexamethasone well.  She denies fever chills sweats. Denies cough, denies  shortness of breath, denies nausea, vomiting ,diarrhea. Denies edema denies bone pain energy and appetite are good    Review of Systems:  14 point ROS of systems including Constitutional, Eyes, Respiratory, Cardiovascular, Gastroenterology, Genitourinary, Integumentary, Muscularskeletal, Psychiatric were all negative except for pertinent positives noted in my HPI.      Current Outpatient Medications   Medication Sig Dispense Refill     acyclovir (ZOVIRAX) 400 MG tablet Take 1 tablet (400 mg) by mouth 2 times daily Viral " Prophylaxis. 60 tablet 5     aspirin 81 MG chewable tablet Take 81 mg by mouth daily        dexamethasone (DECADRON) 4 MG tablet Take 20 mg by mouth once a week Once a week with food on Days 1,8,15.. 20 tablet 0     dexamethasone (DECADRON) 4 MG tablet Take 20 mg by mouth once a week Once a week with food on Days 1,8,15.. 20 tablet 0     dexamethasone (DECADRON) 4 MG tablet Take 20 mg by mouth once a week Once a week with food on Days 1,8,15.. 20 tablet 0     escitalopram (LEXAPRO) 5 MG tablet TAKE 1 TABLET BY MOUTH DAILY 90 tablet 0     LENalidomide (REVLIMID) 10 MG CAPS capsule CHEMO Take 1 capsule (10 mg) by mouth daily for 14 days Take on Days 1 through 14 of 21-day cycle. 14 capsule 0     lisinopril (PRINIVIL/ZESTRIL) 10 MG tablet Take 1 tablet (10 mg) by mouth daily 90 tablet 2     Loperamide HCl (IMODIUM A-D PO) Take by mouth as needed       LORazepam (ATIVAN) 0.5 MG tablet Take 1 tablet (0.5 mg) by mouth every 8 hours as needed (Anxiety, Nausea/Vomiting or Sleep) 30 tablet 3     mirabegron (MYRBETRIQ) 25 MG 24 hr tablet Take 1 tablet (25 mg) by mouth daily 90 tablet 3     nitroglycerin (NITROSTAT) 0.4 MG sublingual tablet For chest pain place 1 tablet under the tongue every 5 minutes for 3 doses. If symptoms persist 5 minutes after 1st dose call 911. 25 tablet 0     order for DME Wheelchair. 1 Units 0     order for DME Dispense one 4 wheeled walker with hand brakes and seat 1 Units 0     prochlorperazine (COMPAZINE) 10 MG tablet Take 1 tablet (10 mg) by mouth every 6 hours as needed (Nausea/Vomiting) 30 tablet 3     Zoledronic Acid (ZOMETA IV)        LENalidomide (REVLIMID) 10 MG CAPS capsule CHEMO Take 1 capsule (10 mg) by mouth daily for 14 days Take on Days 1 through 14 of 21-day cycle. 14 capsule 0     LENalidomide (REVLIMID) 10 MG CAPS capsule CHEMO Take 1 capsule (10 mg) by mouth daily for 14 days Take on Days 1 through 14 of 21-day cycle. 14 capsule 0       Physical Examination:  General: The patient  "is a pleasant female in no acute distress.  /75   Pulse 71   Temp 98.4  F (36.9  C) (Oral)   Resp 16   Ht 1.575 m (5' 2\")   SpO2 98%   BMI 28.72 kg/m     HEENT: EOMI, PERRL. Sclerae are anicteric. Oral mucosa is pink and moist with no lesions or thrush.   Lymph: Neck is supple with no lymphadenopathy in the cervical or supraclavicular areas.   Heart: Regular rate and rhythm.   Lungs: Clear to auscultation bilaterally.   GI: Bowel sounds present, soft, nontender with no palpable hepatosplenomegaly or masses.   Extremities: No lower extremity edema noted bilaterally.   Skin: No rashes, petechiae, or bruising noted on exposed skin.    Laboratory Data:  Results for orders placed or performed in visit on 04/11/19 (from the past 24 hour(s))   Comprehensive metabolic panel   Result Value Ref Range    Sodium 141 133 - 144 mmol/L    Potassium 4.0 3.4 - 5.3 mmol/L    Chloride 109 94 - 109 mmol/L    Carbon Dioxide 25 20 - 32 mmol/L    Anion Gap 7 3 - 14 mmol/L    Glucose 87 70 - 99 mg/dL    Urea Nitrogen 18 7 - 30 mg/dL    Creatinine 0.64 0.52 - 1.04 mg/dL    GFR Estimate 82 >60 mL/min/[1.73_m2]    GFR Estimate If Black >90 >60 mL/min/[1.73_m2]    Calcium 8.7 8.5 - 10.1 mg/dL    Bilirubin Total 0.7 0.2 - 1.3 mg/dL    Albumin 3.5 3.4 - 5.0 g/dL    Protein Total 6.4 (L) 6.8 - 8.8 g/dL    Alkaline Phosphatase 74 40 - 150 U/L    ALT 20 0 - 50 U/L    AST 14 0 - 45 U/L   CBC with platelets differential   Result Value Ref Range    WBC 5.0 4.0 - 11.0 10e9/L    RBC Count 4.42 3.8 - 5.2 10e12/L    Hemoglobin 13.6 11.7 - 15.7 g/dL    Hematocrit 39.6 35.0 - 47.0 %    MCV 90 78 - 100 fl    MCH 30.8 26.5 - 33.0 pg    MCHC 34.3 31.5 - 36.5 g/dL    RDW 13.3 10.0 - 15.0 %    Platelet Count 170 150 - 450 10e9/L    Diff Method Automated Method     % Neutrophils 48.7 %    % Lymphocytes 31.7 %    % Monocytes 17.0 %    % Eosinophils 1.8 %    % Basophils 0.6 %    % Immature Granulocytes 0.2 %    Nucleated RBCs 0 0 /100    Absolute " Neutrophil 2.4 1.6 - 8.3 10e9/L    Absolute Lymphocytes 1.6 0.8 - 5.3 10e9/L    Absolute Monocytes 0.8 0.0 - 1.3 10e9/L    Absolute Eosinophils 0.1 0.0 - 0.7 10e9/L    Absolute Basophils 0.0 0.0 - 0.2 10e9/L    Abs Immature Granulocytes 0.0 0 - 0.4 10e9/L    Absolute Nucleated RBC 0.0          Assessment and Plan:    This is a 83-year-old female with    Multiple myeloma  Stable disease  Patient is on Revlimid 10 mg days 1 through 14 of 21-day cycle and dexamethasone 20 mg once a week  Labs reviewed  Continue with Revlimid and dexamethasone  Patient has scheduled follow-up appointment with Dr. Parry in May    Prophylaxis  Continue with acyclovir 400 mg p.o. twice daily  Continue with aspirin 81      Bone health  Zometa every 3 months  Next one due in July.      MARIUSZ Mckeon CNP  Hem/Onc   [facility]     Chart documentation with Dragon Voice recognition Software. Although reviewed after completion, some words and grammatical errors may remain.          Again, thank you for allowing me to participate in the care of your patient.        Sincerely,        MARIUSZ Mckeon CNP

## 2019-04-11 NOTE — PROGRESS NOTES
Infusion Nursing Note:  Quiana Dunaway presents today for zometa.    Patient seen by provider today: Yes: Juaquin   present during visit today: Not Applicable.    Note: N/A.    Intravenous Access:  Lab draw site R hand, Needle type butterfly, Gauge 23.  Labs drawn without difficulty.  Peripheral IV placed.    Treatment Conditions:  Lab Results   Component Value Date     04/11/2019                   Lab Results   Component Value Date    POTASSIUM 4.0 04/11/2019           No results found for: MAG         Lab Results   Component Value Date    CR 0.64 04/11/2019                   Lab Results   Component Value Date    HUMBERTO 8.7 04/11/2019                Lab Results   Component Value Date    BILITOTAL 0.7 04/11/2019           Lab Results   Component Value Date    ALBUMIN 3.5 04/11/2019                    Lab Results   Component Value Date    ALT 20 04/11/2019           Lab Results   Component Value Date    AST 14 04/11/2019       Results reviewed, labs MET treatment parameters, ok to proceed with treatment.      Post Infusion Assessment:  Patient tolerated infusion without incident.  Site patent and intact, free from redness, edema or discomfort.  No evidence of extravasations.  Access discontinued per protocol.       Discharge Plan:   Discharge instructions reviewed with: Patient.  Patient and/or family verbalized understanding of discharge instructions and all questions answered.  Patient discharged in stable condition accompanied by: self and daughter.  Departure Mode: Ambulatory.    Jessica Shetty RN

## 2019-04-11 NOTE — PROGRESS NOTES
Oral Chemotherapy Monitoring Program.    Patient currently on lenalidomide therapy.    Reviewed labs from 4/11/19. No concerning abnormalities.    Questions answered to patient's satisfaction.    Will follow up in 4 weeks with zometa infusions.     Debra Matos PharmD  April 11, 2019

## 2019-04-11 NOTE — PROGRESS NOTES
Oncology/Hematology Visit Note  Apr 11, 2019    Reason for Visit: follow up of multiple myeloma  Currently on Revlimid and dexamethasone  With Zometa.    Interval History:  The patient has been tolerating Revlimid and dexamethasone well.  She denies fever chills sweats. Denies cough, denies  shortness of breath, denies nausea, vomiting ,diarrhea. Denies edema denies bone pain energy and appetite are good    Review of Systems:  14 point ROS of systems including Constitutional, Eyes, Respiratory, Cardiovascular, Gastroenterology, Genitourinary, Integumentary, Muscularskeletal, Psychiatric were all negative except for pertinent positives noted in my HPI.      Current Outpatient Medications   Medication Sig Dispense Refill     acyclovir (ZOVIRAX) 400 MG tablet Take 1 tablet (400 mg) by mouth 2 times daily Viral Prophylaxis. 60 tablet 5     aspirin 81 MG chewable tablet Take 81 mg by mouth daily        dexamethasone (DECADRON) 4 MG tablet Take 20 mg by mouth once a week Once a week with food on Days 1,8,15.. 20 tablet 0     dexamethasone (DECADRON) 4 MG tablet Take 20 mg by mouth once a week Once a week with food on Days 1,8,15.. 20 tablet 0     dexamethasone (DECADRON) 4 MG tablet Take 20 mg by mouth once a week Once a week with food on Days 1,8,15.. 20 tablet 0     escitalopram (LEXAPRO) 5 MG tablet TAKE 1 TABLET BY MOUTH DAILY 90 tablet 0     LENalidomide (REVLIMID) 10 MG CAPS capsule CHEMO Take 1 capsule (10 mg) by mouth daily for 14 days Take on Days 1 through 14 of 21-day cycle. 14 capsule 0     lisinopril (PRINIVIL/ZESTRIL) 10 MG tablet Take 1 tablet (10 mg) by mouth daily 90 tablet 2     Loperamide HCl (IMODIUM A-D PO) Take by mouth as needed       LORazepam (ATIVAN) 0.5 MG tablet Take 1 tablet (0.5 mg) by mouth every 8 hours as needed (Anxiety, Nausea/Vomiting or Sleep) 30 tablet 3     mirabegron (MYRBETRIQ) 25 MG 24 hr tablet Take 1 tablet (25 mg) by mouth daily 90 tablet 3     nitroglycerin (NITROSTAT) 0.4 MG  "sublingual tablet For chest pain place 1 tablet under the tongue every 5 minutes for 3 doses. If symptoms persist 5 minutes after 1st dose call 911. 25 tablet 0     order for DME Wheelchair. 1 Units 0     order for DME Dispense one 4 wheeled walker with hand brakes and seat 1 Units 0     prochlorperazine (COMPAZINE) 10 MG tablet Take 1 tablet (10 mg) by mouth every 6 hours as needed (Nausea/Vomiting) 30 tablet 3     Zoledronic Acid (ZOMETA IV)        LENalidomide (REVLIMID) 10 MG CAPS capsule CHEMO Take 1 capsule (10 mg) by mouth daily for 14 days Take on Days 1 through 14 of 21-day cycle. 14 capsule 0     LENalidomide (REVLIMID) 10 MG CAPS capsule CHEMO Take 1 capsule (10 mg) by mouth daily for 14 days Take on Days 1 through 14 of 21-day cycle. 14 capsule 0       Physical Examination:  General: The patient is a pleasant female in no acute distress.  /75   Pulse 71   Temp 98.4  F (36.9  C) (Oral)   Resp 16   Ht 1.575 m (5' 2\")   SpO2 98%   BMI 28.72 kg/m    HEENT: EOMI, PERRL. Sclerae are anicteric. Oral mucosa is pink and moist with no lesions or thrush.   Lymph: Neck is supple with no lymphadenopathy in the cervical or supraclavicular areas.   Heart: Regular rate and rhythm.   Lungs: Clear to auscultation bilaterally.   GI: Bowel sounds present, soft, nontender with no palpable hepatosplenomegaly or masses.   Extremities: No lower extremity edema noted bilaterally.   Skin: No rashes, petechiae, or bruising noted on exposed skin.    Laboratory Data:  Results for orders placed or performed in visit on 04/11/19 (from the past 24 hour(s))   Comprehensive metabolic panel   Result Value Ref Range    Sodium 141 133 - 144 mmol/L    Potassium 4.0 3.4 - 5.3 mmol/L    Chloride 109 94 - 109 mmol/L    Carbon Dioxide 25 20 - 32 mmol/L    Anion Gap 7 3 - 14 mmol/L    Glucose 87 70 - 99 mg/dL    Urea Nitrogen 18 7 - 30 mg/dL    Creatinine 0.64 0.52 - 1.04 mg/dL    GFR Estimate 82 >60 mL/min/[1.73_m2]    GFR Estimate " If Black >90 >60 mL/min/[1.73_m2]    Calcium 8.7 8.5 - 10.1 mg/dL    Bilirubin Total 0.7 0.2 - 1.3 mg/dL    Albumin 3.5 3.4 - 5.0 g/dL    Protein Total 6.4 (L) 6.8 - 8.8 g/dL    Alkaline Phosphatase 74 40 - 150 U/L    ALT 20 0 - 50 U/L    AST 14 0 - 45 U/L   CBC with platelets differential   Result Value Ref Range    WBC 5.0 4.0 - 11.0 10e9/L    RBC Count 4.42 3.8 - 5.2 10e12/L    Hemoglobin 13.6 11.7 - 15.7 g/dL    Hematocrit 39.6 35.0 - 47.0 %    MCV 90 78 - 100 fl    MCH 30.8 26.5 - 33.0 pg    MCHC 34.3 31.5 - 36.5 g/dL    RDW 13.3 10.0 - 15.0 %    Platelet Count 170 150 - 450 10e9/L    Diff Method Automated Method     % Neutrophils 48.7 %    % Lymphocytes 31.7 %    % Monocytes 17.0 %    % Eosinophils 1.8 %    % Basophils 0.6 %    % Immature Granulocytes 0.2 %    Nucleated RBCs 0 0 /100    Absolute Neutrophil 2.4 1.6 - 8.3 10e9/L    Absolute Lymphocytes 1.6 0.8 - 5.3 10e9/L    Absolute Monocytes 0.8 0.0 - 1.3 10e9/L    Absolute Eosinophils 0.1 0.0 - 0.7 10e9/L    Absolute Basophils 0.0 0.0 - 0.2 10e9/L    Abs Immature Granulocytes 0.0 0 - 0.4 10e9/L    Absolute Nucleated RBC 0.0          Assessment and Plan:    This is a 83-year-old female with    Multiple myeloma  Stable disease  Patient is on Revlimid 10 mg days 1 through 14 of 21-day cycle and dexamethasone 20 mg once a week  Labs reviewed  Continue with Revlimid and dexamethasone  Patient has scheduled follow-up appointment with Dr. Parry in May    Prophylaxis  Continue with acyclovir 400 mg p.o. twice daily  Continue with aspirin 81      Bone health  Zometa every 3 months  Next one due in July.      MARIUSZ Mckeon CNP  Hem/Onc   [facility]     Chart documentation with Dragon Voice recognition Software. Although reviewed after completion, some words and grammatical errors may remain.

## 2019-04-12 LAB
ALBUMIN SERPL ELPH-MCNC: 3.7 G/DL (ref 3.7–5.1)
ALPHA1 GLOB SERPL ELPH-MCNC: 0.3 G/DL (ref 0.2–0.4)
ALPHA2 GLOB SERPL ELPH-MCNC: 0.7 G/DL (ref 0.5–0.9)
B-GLOBULIN SERPL ELPH-MCNC: 0.7 G/DL (ref 0.6–1)
GAMMA GLOB SERPL ELPH-MCNC: 0.6 G/DL (ref 0.7–1.6)
IGG SERPL-MCNC: 553 MG/DL (ref 695–1620)
KAPPA LC UR-MCNC: 3.26 MG/DL (ref 0.33–1.94)
KAPPA LC/LAMBDA SER: 4.23 {RATIO} (ref 0.26–1.65)
LAMBDA LC SERPL-MCNC: 0.77 MG/DL (ref 0.57–2.63)
M PROTEIN SERPL ELPH-MCNC: 0.3 G/DL
PROT PATTERN SERPL ELPH-IMP: ABNORMAL

## 2019-04-25 DIAGNOSIS — C90.00 MULTIPLE MYELOMA NOT HAVING ACHIEVED REMISSION (H): Primary | ICD-10-CM

## 2019-04-26 RX ORDER — DEXAMETHASONE 4 MG/1
20 TABLET ORAL WEEKLY
Qty: 20 TABLET | Refills: 0 | Status: SHIPPED | OUTPATIENT
Start: 2019-04-26 | End: 2019-06-03

## 2019-04-26 RX ORDER — LENALIDOMIDE 10 MG/1
10 CAPSULE ORAL DAILY
Qty: 14 CAPSULE | Refills: 0 | Status: SHIPPED | OUTPATIENT
Start: 2019-04-26 | End: 2019-08-07

## 2019-05-01 ENCOUNTER — OFFICE VISIT (OUTPATIENT)
Dept: FAMILY MEDICINE | Facility: CLINIC | Age: 84
End: 2019-05-01
Payer: MEDICARE

## 2019-05-01 VITALS
HEART RATE: 78 BPM | SYSTOLIC BLOOD PRESSURE: 131 MMHG | TEMPERATURE: 99.2 F | DIASTOLIC BLOOD PRESSURE: 77 MMHG | OXYGEN SATURATION: 94 % | BODY MASS INDEX: 29.08 KG/M2 | WEIGHT: 158 LBS | HEIGHT: 62 IN

## 2019-05-01 DIAGNOSIS — I10 BENIGN ESSENTIAL HYPERTENSION: ICD-10-CM

## 2019-05-01 DIAGNOSIS — C90.00 MULTIPLE MYELOMA NOT HAVING ACHIEVED REMISSION (H): ICD-10-CM

## 2019-05-01 DIAGNOSIS — J06.9 UPPER RESPIRATORY TRACT INFECTION, UNSPECIFIED TYPE: Primary | ICD-10-CM

## 2019-05-01 PROBLEM — I48.91 ATRIAL FIBRILLATION, UNSPECIFIED TYPE (H): Status: RESOLVED | Noted: 2017-10-17 | Resolved: 2019-05-01

## 2019-05-01 PROCEDURE — 99213 OFFICE O/P EST LOW 20 MIN: CPT | Performed by: INTERNAL MEDICINE

## 2019-05-01 ASSESSMENT — MIFFLIN-ST. JEOR: SCORE: 1129.93

## 2019-05-01 NOTE — PROGRESS NOTES
SUBJECTIVE:   Quiana Dunaway is a 83 year old female who presents to clinic today for the following   health issues:      Hypertension Follow-up    Also has had a URI for 2-3 days. Has a cough, sore throat, nasal congestion.    This is a very pleasant 83-year-old female with a history of multiple myeloma, generalized anxiety, hypertension, pacemaker in place.  She presents for routine follow-up but has noted mild sore throat, nasal congestion and a nonproductive cough over the last 2 to 3 days.  There has been no associated fevers or chills.  There has been no associated dyspnea or chest pain.  She does describe mild fatigue and malaise although those symptoms have been present for many years.  She had recent labs drawn by her oncologist which appear to be stable.    Additional history: as documented    Reviewed  and updated as needed this visit by clinical staff         Reviewed and updated as needed this visit by Provider         Patient Active Problem List   Diagnosis     Benign essential hypertension     Multiple myeloma not having achieved remission (H)     Benzodiazepine overdose, accidental or unintentional, initial encounter     Atrial fibrillation, unspecified type (H)     Hypokalemia     Pacemaker     ROBER (generalized anxiety disorder)     Past Surgical History:   Procedure Laterality Date     BONE MARROW BIOPSY, BONE SPECIMEN, NEEDLE/TROCAR N/A 7/12/2017    Procedure: BIOPSY BONE MARROW;  BONE MARROW BIOPSY ;  Surgeon: Grace Vela MD;  Location:  GI     IMPLANT PACEMAKER  03/2017    AV block       Social History     Tobacco Use     Smoking status: Never Smoker     Smokeless tobacco: Never Used   Substance Use Topics     Alcohol use: No     Alcohol/week: 0.0 oz     Family History   Problem Relation Age of Onset     Unknown/Adopted Mother      Unknown/Adopted Father          Current Outpatient Medications   Medication Sig Dispense Refill     acyclovir (ZOVIRAX) 400 MG tablet Take 1 tablet  "(400 mg) by mouth 2 times daily Viral Prophylaxis. 60 tablet 5     aspirin 81 MG chewable tablet Take 81 mg by mouth daily        dexamethasone (DECADRON) 4 MG tablet Take 20 mg by mouth once a week Once a week with food on Days 1,8,15.. 20 tablet 0     escitalopram (LEXAPRO) 5 MG tablet TAKE 1 TABLET BY MOUTH DAILY 90 tablet 0     LENalidomide (REVLIMID) 10 MG CAPS capsule CHEMO Take 1 capsule (10 mg) by mouth daily for 14 days Take on Days 1 through 14 of 21-day cycle. 14 capsule 0     lisinopril (PRINIVIL/ZESTRIL) 10 MG tablet Take 1 tablet (10 mg) by mouth daily 90 tablet 2     Loperamide HCl (IMODIUM A-D PO) Take by mouth as needed       LORazepam (ATIVAN) 0.5 MG tablet Take 1 tablet (0.5 mg) by mouth every 8 hours as needed (Anxiety, Nausea/Vomiting or Sleep) 30 tablet 3     mirabegron (MYRBETRIQ) 25 MG 24 hr tablet Take 1 tablet (25 mg) by mouth daily 90 tablet 3     nitroglycerin (NITROSTAT) 0.4 MG sublingual tablet For chest pain place 1 tablet under the tongue every 5 minutes for 3 doses. If symptoms persist 5 minutes after 1st dose call 911. 25 tablet 0     order for DME Wheelchair. 1 Units 0     order for DME Dispense one 4 wheeled walker with hand brakes and seat 1 Units 0     prochlorperazine (COMPAZINE) 10 MG tablet Take 1 tablet (10 mg) by mouth every 6 hours as needed (Nausea/Vomiting) 30 tablet 3     Zoledronic Acid (ZOMETA IV)        No Known Allergies    ROS:  Constitutional, HEENT, cardiovascular, pulmonary, gi and gu systems are negative, except as otherwise noted.    OBJECTIVE:     /77 (BP Location: Left arm, Cuff Size: Adult Regular)   Pulse 78   Temp 99.2  F (37.3  C) (Tympanic)   Ht 1.575 m (5' 2\")   Wt 71.7 kg (158 lb)   SpO2 94%   Breastfeeding? No   BMI 28.90 kg/m    Body mass index is 28.9 kg/m .  GENERAL: healthy, alert and no distress  EYES: Eyes grossly normal to inspection, PERRL and conjunctivae and sclerae normal  HENT: ear canals and TM's normal, nose and mouth " without ulcers or lesions  NECK: no adenopathy, no asymmetry, masses, or scars and thyroid normal to palpation  RESP: lungs clear to auscultation - no rales, rhonchi or wheezes  CV: Heart with regular rate and rhythm. No carotid bruits.    ABDOMEN: soft, nontender, no hepatosplenomegaly, no masses and bowel sounds normal  MS: no gross musculoskeletal defects noted, no edema  NEURO: Normal strength and tone, mentation intact and speech normal  PSYCH: mentation appears normal, affect normal/bright    Diagnostic Test Results:  Results for orders placed or performed in visit on 04/11/19   Protein electrophoresis   Result Value Ref Range    Albumin Fraction 3.7 3.7 - 5.1 g/dL    Alpha 1 Fraction 0.3 0.2 - 0.4 g/dL    Alpha 2 Fraction 0.7 0.5 - 0.9 g/dL    Beta Fraction 0.7 0.6 - 1.0 g/dL    Gamma Fraction 0.6 (L) 0.7 - 1.6 g/dL    Monoclonal Peak 0.3 (H) 0.0 g/dL    ELP Interpretation:       Small monoclonal protein (0.3 g/dL) seen in the gamma fraction.  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 Slight hypogammaglobulinemia. Pathologic significance requires clinical correlation.   Jalyn Bowman M.D., Ph.D.      [place] and lambda light chain   Result Value Ref Range    Kappa Free Lt Chain 3.26 (H) 0.33 - 1.94 mg/dL    Lambda Free Lt Chain 0.77 0.57 - 2.63 mg/dL    Kappa Lambda Ratio 4.23 (H) 0.26 - 1.65   IgG   Result Value Ref Range     (L) 695 - 1,620 mg/dL       ASSESSMENT/PLAN:     1. Upper respiratory tract infection, unspecified type  I suspect that this is just a viral upper respiratory tract infection.  However, given her history of multiple myeloma, she is slightly more susceptible to complications from this.  This was discussed with her and her daughter-in-law.  We decided to monitor symptoms over the next week, if symptoms fail to improve after 1 week, return for a chest x-ray, call sooner if symptoms worsen or if new symptoms  develop.    2. Benign essential hypertension  Blood pressure adequately controlled in clinic today    3. Multiple myeloma not having achieved remission (H)  Continue follow-up as directed with hematology oncology        César Chung MD  [facility]

## 2019-05-09 ENCOUNTER — DOCUMENTATION ONLY (OUTPATIENT)
Dept: PHARMACY | Facility: CLINIC | Age: 84
End: 2019-05-09

## 2019-05-09 ENCOUNTER — ANCILLARY PROCEDURE (OUTPATIENT)
Dept: CARDIOLOGY | Facility: CLINIC | Age: 84
End: 2019-05-09
Attending: INTERNAL MEDICINE
Payer: MEDICARE

## 2019-05-09 ENCOUNTER — INFUSION THERAPY VISIT (OUTPATIENT)
Dept: INFUSION THERAPY | Facility: CLINIC | Age: 84
End: 2019-05-09
Attending: INTERNAL MEDICINE
Payer: MEDICARE

## 2019-05-09 ENCOUNTER — ONCOLOGY VISIT (OUTPATIENT)
Dept: ONCOLOGY | Facility: CLINIC | Age: 84
End: 2019-05-09
Attending: INTERNAL MEDICINE
Payer: MEDICARE

## 2019-05-09 ENCOUNTER — HOSPITAL ENCOUNTER (OUTPATIENT)
Facility: CLINIC | Age: 84
Setting detail: SPECIMEN
Discharge: HOME | End: 2019-05-09
Attending: INTERNAL MEDICINE | Admitting: INTERNAL MEDICINE
Payer: MEDICARE

## 2019-05-09 VITALS
HEIGHT: 62 IN | SYSTOLIC BLOOD PRESSURE: 121 MMHG | DIASTOLIC BLOOD PRESSURE: 69 MMHG | TEMPERATURE: 98.6 F | BODY MASS INDEX: 28.93 KG/M2 | WEIGHT: 157.2 LBS | OXYGEN SATURATION: 96 % | HEART RATE: 86 BPM

## 2019-05-09 VITALS
OXYGEN SATURATION: 96 % | DIASTOLIC BLOOD PRESSURE: 69 MMHG | SYSTOLIC BLOOD PRESSURE: 121 MMHG | HEART RATE: 86 BPM | TEMPERATURE: 98.6 F | RESPIRATION RATE: 20 BRPM

## 2019-05-09 DIAGNOSIS — Z95.0 PACEMAKER: ICD-10-CM

## 2019-05-09 DIAGNOSIS — C90.00 MULTIPLE MYELOMA NOT HAVING ACHIEVED REMISSION (H): Primary | ICD-10-CM

## 2019-05-09 DIAGNOSIS — C90.00 MULTIPLE MYELOMA NOT HAVING ACHIEVED REMISSION (H): ICD-10-CM

## 2019-05-09 LAB
ALBUMIN SERPL-MCNC: 3.3 G/DL (ref 3.4–5)
ALP SERPL-CCNC: 92 U/L (ref 40–150)
ALT SERPL W P-5'-P-CCNC: 20 U/L (ref 0–50)
ANION GAP SERPL CALCULATED.3IONS-SCNC: 5 MMOL/L (ref 3–14)
AST SERPL W P-5'-P-CCNC: 13 U/L (ref 0–45)
BASOPHILS # BLD AUTO: 0 10E9/L (ref 0–0.2)
BASOPHILS NFR BLD AUTO: 0.7 %
BILIRUB SERPL-MCNC: 0.4 MG/DL (ref 0.2–1.3)
BUN SERPL-MCNC: 17 MG/DL (ref 7–30)
CALCIUM SERPL-MCNC: 8.3 MG/DL (ref 8.5–10.1)
CHLORIDE SERPL-SCNC: 110 MMOL/L (ref 94–109)
CO2 SERPL-SCNC: 29 MMOL/L (ref 20–32)
CREAT SERPL-MCNC: 0.58 MG/DL (ref 0.52–1.04)
DIFFERENTIAL METHOD BLD: NORMAL
EOSINOPHIL # BLD AUTO: 0.2 10E9/L (ref 0–0.7)
EOSINOPHIL NFR BLD AUTO: 4.7 %
ERYTHROCYTE [DISTWIDTH] IN BLOOD BY AUTOMATED COUNT: 13.1 % (ref 10–15)
GFR SERPL CREATININE-BSD FRML MDRD: 85 ML/MIN/{1.73_M2}
GLUCOSE SERPL-MCNC: 111 MG/DL (ref 70–99)
HCT VFR BLD AUTO: 37.8 % (ref 35–47)
HGB BLD-MCNC: 13.1 G/DL (ref 11.7–15.7)
IMM GRANULOCYTES # BLD: 0 10E9/L (ref 0–0.4)
IMM GRANULOCYTES NFR BLD: 0.9 %
LYMPHOCYTES # BLD AUTO: 1.6 10E9/L (ref 0.8–5.3)
LYMPHOCYTES NFR BLD AUTO: 35.2 %
MCH RBC QN AUTO: 31.1 PG (ref 26.5–33)
MCHC RBC AUTO-ENTMCNC: 34.7 G/DL (ref 31.5–36.5)
MCV RBC AUTO: 90 FL (ref 78–100)
MONOCYTES # BLD AUTO: 0.8 10E9/L (ref 0–1.3)
MONOCYTES NFR BLD AUTO: 17.9 %
NEUTROPHILS # BLD AUTO: 1.8 10E9/L (ref 1.6–8.3)
NEUTROPHILS NFR BLD AUTO: 40.6 %
NRBC # BLD AUTO: 0 10*3/UL
NRBC BLD AUTO-RTO: 0 /100
PLATELET # BLD AUTO: 172 10E9/L (ref 150–450)
POTASSIUM SERPL-SCNC: 3.6 MMOL/L (ref 3.4–5.3)
PROT SERPL-MCNC: 6.5 G/DL (ref 6.8–8.8)
RBC # BLD AUTO: 4.21 10E12/L (ref 3.8–5.2)
SODIUM SERPL-SCNC: 144 MMOL/L (ref 133–144)
WBC # BLD AUTO: 4.5 10E9/L (ref 4–11)

## 2019-05-09 PROCEDURE — 83883 ASSAY NEPHELOMETRY NOT SPEC: CPT | Performed by: INTERNAL MEDICINE

## 2019-05-09 PROCEDURE — 80053 COMPREHEN METABOLIC PANEL: CPT | Performed by: INTERNAL MEDICINE

## 2019-05-09 PROCEDURE — 85025 COMPLETE CBC W/AUTO DIFF WBC: CPT | Performed by: INTERNAL MEDICINE

## 2019-05-09 PROCEDURE — 36415 COLL VENOUS BLD VENIPUNCTURE: CPT

## 2019-05-09 PROCEDURE — 93294 REM INTERROG EVL PM/LDLS PM: CPT | Performed by: INTERNAL MEDICINE

## 2019-05-09 PROCEDURE — G0463 HOSPITAL OUTPT CLINIC VISIT: HCPCS

## 2019-05-09 PROCEDURE — 93296 REM INTERROG EVL PM/IDS: CPT | Performed by: INTERNAL MEDICINE

## 2019-05-09 PROCEDURE — 84165 PROTEIN E-PHORESIS SERUM: CPT | Performed by: INTERNAL MEDICINE

## 2019-05-09 PROCEDURE — 00000402 ZZHCL STATISTIC TOTAL PROTEIN: Performed by: INTERNAL MEDICINE

## 2019-05-09 PROCEDURE — 82784 ASSAY IGA/IGD/IGG/IGM EACH: CPT | Performed by: INTERNAL MEDICINE

## 2019-05-09 PROCEDURE — 99213 OFFICE O/P EST LOW 20 MIN: CPT | Performed by: INTERNAL MEDICINE

## 2019-05-09 RX ORDER — ZOLEDRONIC ACID 0.04 MG/ML
4 INJECTION, SOLUTION INTRAVENOUS ONCE
Status: CANCELLED | OUTPATIENT
Start: 2019-10-03

## 2019-05-09 ASSESSMENT — PAIN SCALES - GENERAL
PAINLEVEL: NO PAIN (0)
PAINLEVEL: NO PAIN (0)

## 2019-05-09 ASSESSMENT — MIFFLIN-ST. JEOR: SCORE: 1126.3

## 2019-05-09 NOTE — PROGRESS NOTES
"Oncology Rooming Note    May 9, 2019 2:05 PM   Quiana Dunaway is a 83 year old female who presents for:    Chief Complaint   Patient presents with     Oncology Clinic Visit     Initial Vitals: /69   Pulse 86   Temp 98.6  F (37  C) (Oral)   Ht 1.575 m (5' 2\")   Wt 71.3 kg (157 lb 3.2 oz)   SpO2 96%   BMI 28.75 kg/m   Estimated body mass index is 28.75 kg/m  as calculated from the following:    Height as of this encounter: 1.575 m (5' 2\").    Weight as of this encounter: 71.3 kg (157 lb 3.2 oz). Body surface area is 1.77 meters squared.  No Pain (0) Comment: Data Unavailable   No LMP recorded. Patient is postmenopausal.  Allergies reviewed: No  Medications reviewed: Yes    Medications: Medication refills not needed today.  Pharmacy name entered into [employer] [place], [place] - [facility] [address] [facility] [address]    Clinical concerns:  doctor was notified.      Mariela Lyn CMA            "

## 2019-05-09 NOTE — PROGRESS NOTES
Infusion Nursing Note:  Quiana Dunaway presents today for labs.    Patient seen by provider today: Yes: Dr. Parry   present during visit today: Not Applicable.    Note: N/A.    Intravenous Access:  Peripheral IV placed.    Treatment Conditions:  Not Applicable.      Post Infusion Assessment:  Patient tolerated infusion without incident.  Blood return noted pre and post infusion.  Site patent and intact, free from redness, edema or discomfort.  No evidence of extravasations.  Access discontinued per protocol.       Discharge Plan:   Patient declined prescription refills.  Discharge instructions reviewed with: Patient and Family.  Patient and/or family verbalized understanding of discharge instructions and all questions answered.  AVS to patient via [employer].  Patient will return as scheduled for next appointment.   Patient discharged in stable condition accompanied by: self and daughter-in-law.  Departure Mode: Ambulatory.    Isabel Bethea RN

## 2019-05-09 NOTE — PROGRESS NOTES
Oral Chemotherapy Monitoring Program.    Patient currently on Revlimid therapy.    Reviewed lab results from 5/9/19.    There were no concerning abnormalities.    Will follow up in 1 month    Laci Escobar PharmD.

## 2019-05-09 NOTE — LETTER
"    5/9/2019         RE: Quiana Dunaway  [address] [facility] [address]        Dear Colleague,    Thank you for referring your patient, Quiana Dunaway, to the [facility]. Please see a copy of my visit note below.    Oncology Rooming Note    May 9, 2019 2:05 PM   Quiana Dunaway is a 83 year old female who presents for:    Chief Complaint   Patient presents with     Oncology Clinic Visit     Initial Vitals: /69   Pulse 86   Temp 98.6  F (37  C) (Oral)   Ht 1.575 m (5' 2\")   Wt 71.3 kg (157 lb 3.2 oz)   SpO2 96%   BMI 28.75 kg/m    Estimated body mass index is 28.75 kg/m  as calculated from the following:    Height as of this encounter: 1.575 m (5' 2\").    Weight as of this encounter: 71.3 kg (157 lb 3.2 oz). Body surface area is 1.77 meters squared.  No Pain (0) Comment: Data Unavailable   No LMP recorded. Patient is postmenopausal.  Allergies reviewed: No  Medications reviewed: Yes    Medications: Medication refills not needed today.  Pharmacy name entered into [employer]: [facility] [address] [facility], [place] - [address] [facility] [place] [facility] [address]    Clinical concerns:  doctor was notified.      Mariela Lyn, [facility]              Visit Date:   05/09/2019      SUBJECTIVE:  Ms. Dunaway is an 83-year-old female with high-risk multiple myeloma.  She is on Revlimid and dexamethasone.  Overall, she is tolerating it well.      For the last few days she has some cough, but it is nonproductive.  No fever or chills.      No headache.  No dizziness.  No chest pain.  No shortness of breath.  No abdominal pain.  No nausea or vomiting.  Appetite good.  No urinary or bowel complaints.  Mild fatigue.      PHYSICAL EXAMINATION:  Unchanged.      LABORATORY DATA:  Reviewed.      ASSESSMENT:   1.  An 83-year-old female with high-risk multiple myeloma.   2.  Cough, viral origin.      PLAN:   1.  The patient is doing well from myeloma.  Labs in 04/2019 reveals myeloma to be stable.      The " patient is on Revlimid and dexamethasone.  She will continue on it.  She is tolerating it well.  Side effects reviewed.   2.  She has some cough.  Some viral origin.  No need for any antibiotics right now.  I advised her to take over-the-counter cough syrup.  The patient advised to call us if her cough does not get better in the next few days.   3.  The patient will continue on Zometa every 3 months.  She is not having any dental or jaw related problem.   4.  She had a few questions, which were all answered.  I will see her in 3 months' time.  In between, she will see our nurse practitioner.         YVES PEREZ MD             D: 2019   T: 2019   MT: AS      Name:     DUGLAS CASTILLO   MRN:      [id number]        Account:      [id number]   :      1935           Visit Date:   2019      Document: [id number]       Again, thank you for allowing me to participate in the care of your patient.        Sincerely,        Yves Perez MD

## 2019-05-10 LAB
ALBUMIN SERPL ELPH-MCNC: 3.6 G/DL (ref 3.7–5.1)
ALPHA1 GLOB SERPL ELPH-MCNC: 0.3 G/DL (ref 0.2–0.4)
ALPHA2 GLOB SERPL ELPH-MCNC: 0.9 G/DL (ref 0.5–0.9)
B-GLOBULIN SERPL ELPH-MCNC: 0.7 G/DL (ref 0.6–1)
GAMMA GLOB SERPL ELPH-MCNC: 0.6 G/DL (ref 0.7–1.6)
IGG SERPL-MCNC: 533 MG/DL (ref 695–1620)
KAPPA LC UR-MCNC: 2.7 MG/DL (ref 0.33–1.94)
KAPPA LC/LAMBDA SER: 2.78 {RATIO} (ref 0.26–1.65)
LAMBDA LC SERPL-MCNC: 0.97 MG/DL (ref 0.57–2.63)
M PROTEIN SERPL ELPH-MCNC: 0.3 G/DL
PROT PATTERN SERPL ELPH-IMP: ABNORMAL

## 2019-05-10 NOTE — PROGRESS NOTES
Visit Date:   05/09/2019     HEMATOLOGY HISTORY: Mrs. Dunaway is a lady with multiple myeloma (IgG kappa). High risk, t(14;16).  1. On 05/01/2017, WBC of 3.4, hemoglobin of 10.2 and platelet of 154.  2. SPEP on 07/07/2017 revealed M-spike of 1.8.  3. Bone marrow biopsy on 07/12/2017 reveals kappa monotypic plasma cell population consistent with multiple myeloma.  Bone marrow is 70% cellular.  Plasma cell is 50-60%.     -There is gain of chromosome 1, 5, 9, 15, and 17.  There is translocation 14;16.   4.  On 07/18/2017:  -M-spike of 1.9.   -Immunofixation reveals monoclonal IgG kappa.    -IgG level of 2970.  IgA of 15 and IgM of 175.    -Kappa free light chain of 67.7.  Lambda free light chain of 1.42.  Ratio of kappa to lambda of 47.71.    -Beta 2 microglobulin of 3.4.   5. PET scan on 07/24/2017 reveals several focal areas of increased FDG uptake consistent with multiple myeloma.  There is probable epithelial cyst in tail of the pancreas.  No associated abnormal FDG activity.  Left thyroid cysts or nodules without any FDG activity.  There is a 0.3 cm left upper lobe lung nodule.   8.  Velcade, Revlimid and dexamethasone between on 08/14/2017 and 04/30/2018. Velcade stopped.   -Revlimid and dexamethasone continued.    SUBJECTIVE:  Ms. Dunaway is an 83-year-old female with high-risk multiple myeloma.  She is on Revlimid and dexamethasone.  Overall, she is tolerating it well.      For the last few days, she has some cough. It is nonproductive.  No fever or chills.      No headache.  No dizziness.  No chest pain.  No shortness of breath.  No abdominal pain.  No nausea or vomiting.  Appetite good.  No urinary or bowel complaints.  Mild fatigue.      PHYSICAL EXAMINATION:   GENERAL:  She is alert and oriented x 3.   VITAL SIGNS:  Reviewed.  ECOG PS of 1.   FACE:  No swelling.     EYES:  No icterus.    THROAT:  No ulcer.   NECK:  Supple. No lymphadenopathy.  No tenderness.   LUNGS:  Good air entry bilaterally.  No  wheezing.   HEART:  Regular.   ABDOMEN:  Soft.  Nontender.  No mass.   EXTREMITIES:  No edema. No calf redness or tenderness.   SKIN:  No rash. No petechiae.    LABORATORY DATA:  Reviewed.      ASSESSMENT:   1.  An 83-year-old female with high-risk multiple myeloma.   2.  Cough of viral origin.      PLAN:   1.  The patient is doing well from myeloma.  Labs in 2019 reveals myeloma to be stable. The patient is on Revlimid and dexamethasone.  She will continue on it.  She is tolerating it well.  Side effects reviewed.   2.  She has some cough. It is of viral origin.  No need for any antibiotics right now.  I advised her to take over-the-counter cough syrup.  The patient advised to call us if her cough does not get better in the next few days.   3.  The patient will continue on Zometa every 3 months.  She is not having any dental or jaw related problem.   4.  She had a few questions, which were all answered.  I will see her in 3 months' time.  In between, she will see our nurse practitioner.         YVES PEREZ MD             D: 2019   T: 2019   MT: AS      Name:     DUGLAS CASTILLO   MRN:      [id number]        Account:      [id number]   :      1935           Visit Date:   2019      Document: [id number]

## 2019-05-17 LAB
MDC_IDC_EPISODE_DTM: NORMAL
MDC_IDC_EPISODE_DURATION: 0 S
MDC_IDC_EPISODE_DURATION: 118 S
MDC_IDC_EPISODE_DURATION: 1865 S
MDC_IDC_EPISODE_DURATION: 599 S
MDC_IDC_EPISODE_DURATION: 643 S
MDC_IDC_EPISODE_DURATION: 72 S
MDC_IDC_EPISODE_ID: 16
MDC_IDC_EPISODE_ID: 17
MDC_IDC_EPISODE_ID: 18
MDC_IDC_EPISODE_ID: 19
MDC_IDC_EPISODE_ID: 20
MDC_IDC_EPISODE_ID: 21
MDC_IDC_EPISODE_TYPE: NORMAL
MDC_IDC_LEAD_IMPLANT_DT: NORMAL
MDC_IDC_LEAD_IMPLANT_DT: NORMAL
MDC_IDC_LEAD_LOCATION: NORMAL
MDC_IDC_LEAD_LOCATION: NORMAL
MDC_IDC_LEAD_LOCATION_DETAIL_1: NORMAL
MDC_IDC_LEAD_LOCATION_DETAIL_1: NORMAL
MDC_IDC_LEAD_MFG: NORMAL
MDC_IDC_LEAD_MFG: NORMAL
MDC_IDC_LEAD_MODEL: NORMAL
MDC_IDC_LEAD_MODEL: NORMAL
MDC_IDC_LEAD_POLARITY_TYPE: NORMAL
MDC_IDC_LEAD_POLARITY_TYPE: NORMAL
MDC_IDC_LEAD_SERIAL: NORMAL
MDC_IDC_LEAD_SERIAL: NORMAL
MDC_IDC_MSMT_BATTERY_DTM: NORMAL
MDC_IDC_MSMT_BATTERY_REMAINING_LONGEVITY: 96 MO
MDC_IDC_MSMT_BATTERY_RRT_TRIGGER: 2.83
MDC_IDC_MSMT_BATTERY_STATUS: NORMAL
MDC_IDC_MSMT_BATTERY_VOLTAGE: 3.02 V
MDC_IDC_MSMT_LEADCHNL_RA_IMPEDANCE_VALUE: 342 OHM
MDC_IDC_MSMT_LEADCHNL_RA_IMPEDANCE_VALUE: 418 OHM
MDC_IDC_MSMT_LEADCHNL_RA_PACING_THRESHOLD_AMPLITUDE: 0.5 V
MDC_IDC_MSMT_LEADCHNL_RA_PACING_THRESHOLD_PULSEWIDTH: 0.4 MS
MDC_IDC_MSMT_LEADCHNL_RA_SENSING_INTR_AMPL: 3.75 MV
MDC_IDC_MSMT_LEADCHNL_RA_SENSING_INTR_AMPL: 3.75 MV
MDC_IDC_MSMT_LEADCHNL_RV_IMPEDANCE_VALUE: 399 OHM
MDC_IDC_MSMT_LEADCHNL_RV_IMPEDANCE_VALUE: 494 OHM
MDC_IDC_MSMT_LEADCHNL_RV_PACING_THRESHOLD_AMPLITUDE: 1.25 V
MDC_IDC_MSMT_LEADCHNL_RV_PACING_THRESHOLD_PULSEWIDTH: 0.4 MS
MDC_IDC_MSMT_LEADCHNL_RV_SENSING_INTR_AMPL: 3.38 MV
MDC_IDC_MSMT_LEADCHNL_RV_SENSING_INTR_AMPL: 3.38 MV
MDC_IDC_PG_IMPLANT_DTM: NORMAL
MDC_IDC_PG_MFG: NORMAL
MDC_IDC_PG_MODEL: NORMAL
MDC_IDC_PG_SERIAL: NORMAL
MDC_IDC_PG_TYPE: NORMAL
MDC_IDC_SESS_CLINIC_NAME: NORMAL
MDC_IDC_SESS_DTM: NORMAL
MDC_IDC_SESS_TYPE: NORMAL
MDC_IDC_SET_BRADY_AT_MODE_SWITCH_RATE: 171 {BEATS}/MIN
MDC_IDC_SET_BRADY_HYSTRATE: NORMAL
MDC_IDC_SET_BRADY_LOWRATE: 60 {BEATS}/MIN
MDC_IDC_SET_BRADY_MAX_SENSOR_RATE: 120 {BEATS}/MIN
MDC_IDC_SET_BRADY_MAX_TRACKING_RATE: 120 {BEATS}/MIN
MDC_IDC_SET_BRADY_MODE: NORMAL
MDC_IDC_SET_BRADY_PAV_DELAY_LOW: 300 MS
MDC_IDC_SET_BRADY_SAV_DELAY_LOW: 300 MS
MDC_IDC_SET_LEADCHNL_RA_PACING_AMPLITUDE: 1.5 V
MDC_IDC_SET_LEADCHNL_RA_PACING_ANODE_ELECTRODE_1: NORMAL
MDC_IDC_SET_LEADCHNL_RA_PACING_ANODE_LOCATION_1: NORMAL
MDC_IDC_SET_LEADCHNL_RA_PACING_CAPTURE_MODE: NORMAL
MDC_IDC_SET_LEADCHNL_RA_PACING_CATHODE_ELECTRODE_1: NORMAL
MDC_IDC_SET_LEADCHNL_RA_PACING_CATHODE_LOCATION_1: NORMAL
MDC_IDC_SET_LEADCHNL_RA_PACING_POLARITY: NORMAL
MDC_IDC_SET_LEADCHNL_RA_PACING_PULSEWIDTH: 0.4 MS
MDC_IDC_SET_LEADCHNL_RA_SENSING_ANODE_ELECTRODE_1: NORMAL
MDC_IDC_SET_LEADCHNL_RA_SENSING_ANODE_LOCATION_1: NORMAL
MDC_IDC_SET_LEADCHNL_RA_SENSING_CATHODE_ELECTRODE_1: NORMAL
MDC_IDC_SET_LEADCHNL_RA_SENSING_CATHODE_LOCATION_1: NORMAL
MDC_IDC_SET_LEADCHNL_RA_SENSING_POLARITY: NORMAL
MDC_IDC_SET_LEADCHNL_RA_SENSING_SENSITIVITY: 0.3 MV
MDC_IDC_SET_LEADCHNL_RV_PACING_AMPLITUDE: 2.5 V
MDC_IDC_SET_LEADCHNL_RV_PACING_ANODE_ELECTRODE_1: NORMAL
MDC_IDC_SET_LEADCHNL_RV_PACING_ANODE_LOCATION_1: NORMAL
MDC_IDC_SET_LEADCHNL_RV_PACING_CAPTURE_MODE: NORMAL
MDC_IDC_SET_LEADCHNL_RV_PACING_CATHODE_ELECTRODE_1: NORMAL
MDC_IDC_SET_LEADCHNL_RV_PACING_CATHODE_LOCATION_1: NORMAL
MDC_IDC_SET_LEADCHNL_RV_PACING_POLARITY: NORMAL
MDC_IDC_SET_LEADCHNL_RV_PACING_PULSEWIDTH: 0.4 MS
MDC_IDC_SET_LEADCHNL_RV_SENSING_ANODE_ELECTRODE_1: NORMAL
MDC_IDC_SET_LEADCHNL_RV_SENSING_ANODE_LOCATION_1: NORMAL
MDC_IDC_SET_LEADCHNL_RV_SENSING_CATHODE_ELECTRODE_1: NORMAL
MDC_IDC_SET_LEADCHNL_RV_SENSING_CATHODE_LOCATION_1: NORMAL
MDC_IDC_SET_LEADCHNL_RV_SENSING_POLARITY: NORMAL
MDC_IDC_SET_LEADCHNL_RV_SENSING_SENSITIVITY: 0.9 MV
MDC_IDC_SET_ZONE_DETECTION_INTERVAL: 350 MS
MDC_IDC_SET_ZONE_DETECTION_INTERVAL: 400 MS
MDC_IDC_SET_ZONE_TYPE: NORMAL
MDC_IDC_STAT_AT_BURDEN_PERCENT: 0 %
MDC_IDC_STAT_AT_DTM_END: NORMAL
MDC_IDC_STAT_AT_DTM_START: NORMAL
MDC_IDC_STAT_BRADY_AP_VP_PERCENT: 0.04 %
MDC_IDC_STAT_BRADY_AP_VS_PERCENT: 48.14 %
MDC_IDC_STAT_BRADY_AS_VP_PERCENT: 0.02 %
MDC_IDC_STAT_BRADY_AS_VS_PERCENT: 51.8 %
MDC_IDC_STAT_BRADY_DTM_END: NORMAL
MDC_IDC_STAT_BRADY_DTM_START: NORMAL
MDC_IDC_STAT_BRADY_RA_PERCENT_PACED: 48.05 %
MDC_IDC_STAT_BRADY_RV_PERCENT_PACED: 0.07 %
MDC_IDC_STAT_EPISODE_RECENT_COUNT: 0
MDC_IDC_STAT_EPISODE_RECENT_COUNT: 0
MDC_IDC_STAT_EPISODE_RECENT_COUNT: 1
MDC_IDC_STAT_EPISODE_RECENT_COUNT: 5
MDC_IDC_STAT_EPISODE_RECENT_COUNT_DTM_END: NORMAL
MDC_IDC_STAT_EPISODE_RECENT_COUNT_DTM_START: NORMAL
MDC_IDC_STAT_EPISODE_TOTAL_COUNT: 0
MDC_IDC_STAT_EPISODE_TOTAL_COUNT: 0
MDC_IDC_STAT_EPISODE_TOTAL_COUNT: 15
MDC_IDC_STAT_EPISODE_TOTAL_COUNT: 6
MDC_IDC_STAT_EPISODE_TOTAL_COUNT_DTM_END: NORMAL
MDC_IDC_STAT_EPISODE_TOTAL_COUNT_DTM_START: NORMAL
MDC_IDC_STAT_EPISODE_TYPE: NORMAL

## 2019-05-17 RX ORDER — DEXAMETHASONE 4 MG/1
20 TABLET ORAL WEEKLY
Qty: 20 TABLET | Refills: 0 | Status: SHIPPED | OUTPATIENT
Start: 2019-05-17 | End: 2019-08-07

## 2019-05-17 RX ORDER — LENALIDOMIDE 10 MG/1
10 CAPSULE ORAL DAILY
Qty: 14 CAPSULE | Refills: 0 | Status: SHIPPED | OUTPATIENT
Start: 2019-05-17 | End: 2019-08-07

## 2019-05-20 ENCOUNTER — TELEPHONE (OUTPATIENT)
Dept: ONCOLOGY | Facility: CLINIC | Age: 84
End: 2019-05-20

## 2019-05-20 NOTE — TELEPHONE ENCOUNTER
Oral Chemotherapy Monitoring Program    Medication: Revlimid  Rx:  10mg PO daily on days 1-14, of 21 day cycle    Auth #: [id number]  Risk Category: Adult female NOT of reproductive capacity    Routine survey questions reviewed.    Thank you, if you have any further questions please feel free to contact me.    Mirela Boland  Oral Oncology Liaison  [email or web address]  Phone: [phone]  Fax: [phone]

## 2019-06-03 ENCOUNTER — HOSPITAL ENCOUNTER (OUTPATIENT)
Facility: CLINIC | Age: 84
Setting detail: SPECIMEN
Discharge: HOME | End: 2019-06-03
Attending: NURSE PRACTITIONER | Admitting: NURSE PRACTITIONER
Payer: MEDICARE

## 2019-06-03 ENCOUNTER — INFUSION THERAPY VISIT (OUTPATIENT)
Dept: INFUSION THERAPY | Facility: CLINIC | Age: 84
End: 2019-06-03
Attending: NURSE PRACTITIONER
Payer: MEDICARE

## 2019-06-03 ENCOUNTER — MYC MEDICAL ADVICE (OUTPATIENT)
Dept: FAMILY MEDICINE | Facility: CLINIC | Age: 84
End: 2019-06-03

## 2019-06-03 ENCOUNTER — ONCOLOGY VISIT (OUTPATIENT)
Dept: ONCOLOGY | Facility: CLINIC | Age: 84
End: 2019-06-03
Attending: INTERNAL MEDICINE
Payer: MEDICARE

## 2019-06-03 VITALS
BODY MASS INDEX: 29.3 KG/M2 | TEMPERATURE: 98.4 F | HEIGHT: 62 IN | SYSTOLIC BLOOD PRESSURE: 128 MMHG | OXYGEN SATURATION: 94 % | WEIGHT: 159.2 LBS | DIASTOLIC BLOOD PRESSURE: 66 MMHG | HEART RATE: 93 BPM | RESPIRATION RATE: 16 BRPM

## 2019-06-03 DIAGNOSIS — Z01.84 IMMUNITY STATUS TESTING: Primary | ICD-10-CM

## 2019-06-03 DIAGNOSIS — R53.83 FATIGUE, UNSPECIFIED TYPE: ICD-10-CM

## 2019-06-03 DIAGNOSIS — C90.00 MULTIPLE MYELOMA NOT HAVING ACHIEVED REMISSION (H): ICD-10-CM

## 2019-06-03 LAB
ALBUMIN SERPL-MCNC: 3.5 G/DL (ref 3.4–5)
ALP SERPL-CCNC: 81 U/L (ref 40–150)
ALT SERPL W P-5'-P-CCNC: 21 U/L (ref 0–50)
ANION GAP SERPL CALCULATED.3IONS-SCNC: 8 MMOL/L (ref 3–14)
AST SERPL W P-5'-P-CCNC: 11 U/L (ref 0–45)
BASOPHILS # BLD AUTO: 0 10E9/L (ref 0–0.2)
BASOPHILS NFR BLD AUTO: 0.2 %
BILIRUB SERPL-MCNC: 0.7 MG/DL (ref 0.2–1.3)
BUN SERPL-MCNC: 15 MG/DL (ref 7–30)
CALCIUM SERPL-MCNC: 9.1 MG/DL (ref 8.5–10.1)
CHLORIDE SERPL-SCNC: 108 MMOL/L (ref 94–109)
CO2 SERPL-SCNC: 26 MMOL/L (ref 20–32)
CREAT SERPL-MCNC: 0.63 MG/DL (ref 0.52–1.04)
DIFFERENTIAL METHOD BLD: ABNORMAL
EOSINOPHIL # BLD AUTO: 0 10E9/L (ref 0–0.7)
EOSINOPHIL NFR BLD AUTO: 0.2 %
ERYTHROCYTE [DISTWIDTH] IN BLOOD BY AUTOMATED COUNT: 13.3 % (ref 10–15)
GFR SERPL CREATININE-BSD FRML MDRD: 83 ML/MIN/{1.73_M2}
GLUCOSE SERPL-MCNC: 126 MG/DL (ref 70–99)
HCT VFR BLD AUTO: 38.5 % (ref 35–47)
HGB BLD-MCNC: 13.4 G/DL (ref 11.7–15.7)
IMM GRANULOCYTES # BLD: 0.1 10E9/L (ref 0–0.4)
IMM GRANULOCYTES NFR BLD: 1.3 %
LYMPHOCYTES # BLD AUTO: 0.7 10E9/L (ref 0.8–5.3)
LYMPHOCYTES NFR BLD AUTO: 13.9 %
MCH RBC QN AUTO: 30.9 PG (ref 26.5–33)
MCHC RBC AUTO-ENTMCNC: 34.8 G/DL (ref 31.5–36.5)
MCV RBC AUTO: 89 FL (ref 78–100)
MONOCYTES # BLD AUTO: 0.1 10E9/L (ref 0–1.3)
MONOCYTES NFR BLD AUTO: 1.1 %
NEUTROPHILS # BLD AUTO: 3.9 10E9/L (ref 1.6–8.3)
NEUTROPHILS NFR BLD AUTO: 83.3 %
NRBC # BLD AUTO: 0 10*3/UL
NRBC BLD AUTO-RTO: 0 /100
PLATELET # BLD AUTO: 200 10E9/L (ref 150–450)
POTASSIUM SERPL-SCNC: 3.6 MMOL/L (ref 3.4–5.3)
PROT SERPL-MCNC: 6.9 G/DL (ref 6.8–8.8)
RBC # BLD AUTO: 4.33 10E12/L (ref 3.8–5.2)
SODIUM SERPL-SCNC: 142 MMOL/L (ref 133–144)
WBC # BLD AUTO: 4.7 10E9/L (ref 4–11)

## 2019-06-03 PROCEDURE — 36415 COLL VENOUS BLD VENIPUNCTURE: CPT

## 2019-06-03 PROCEDURE — G0463 HOSPITAL OUTPT CLINIC VISIT: HCPCS

## 2019-06-03 PROCEDURE — 85025 COMPLETE CBC W/AUTO DIFF WBC: CPT | Performed by: INTERNAL MEDICINE

## 2019-06-03 PROCEDURE — 99213 OFFICE O/P EST LOW 20 MIN: CPT | Performed by: NURSE PRACTITIONER

## 2019-06-03 PROCEDURE — 80053 COMPREHEN METABOLIC PANEL: CPT | Performed by: INTERNAL MEDICINE

## 2019-06-03 RX ORDER — PROCHLORPERAZINE MALEATE 10 MG
10 TABLET ORAL EVERY 6 HOURS PRN
Qty: 30 TABLET | Refills: 3 | Status: SHIPPED | OUTPATIENT
Start: 2019-06-03 | End: 2019-08-07

## 2019-06-03 RX ORDER — DEXAMETHASONE 4 MG/1
20 TABLET ORAL WEEKLY
Qty: 20 TABLET | Refills: 0 | Status: SHIPPED | OUTPATIENT
Start: 2019-06-03 | End: 2019-08-07

## 2019-06-03 ASSESSMENT — MIFFLIN-ST. JEOR: SCORE: 1135.38

## 2019-06-03 ASSESSMENT — PAIN SCALES - GENERAL: PAINLEVEL: MODERATE PAIN (4)

## 2019-06-03 NOTE — LETTER
"    6/3/2019         RE: Quiana Dunaway  [address] [facility] [address]        Dear Colleague,    Thank you for referring your patient, Quiana Dunaway, to the [facility]. Please see a copy of my visit note below.    Oncology Rooming Note    Cort[place] 3, 2019 1:34 PM   Quiana Dunaway is a 83 year old female who presents for:    Chief Complaint   Patient presents with     Oncology Clinic Visit     Initial Vitals: /66   Pulse 93   Temp 98.4  F (36.9  C) (Oral)   Resp 16   Ht 1.575 m (5' 2\")   SpO2 94%   BMI 28.75 kg/m    Estimated body mass index is 28.75 kg/m  as calculated from the following:    Height as of this encounter: 1.575 m (5' 2\").    Weight as of 5/9/19: 71.3 kg (157 lb 3.2 oz). Body surface area is 1.77 meters squared.  Moderate Pain (4) Comment: Data Unavailable   No LMP recorded. Patient is postmenopausal.  Allergies reviewed: Yes  Medications reviewed: Yes    Medications: Medication refills needed today.    Clinical concerns: Needs refills of Decadron and Compazine. [facility]. Also has an issue with her mouth and tongue.       Lauren Arredondo, [facility]              Oncology/Hematology Visit Note  Osmin 3, 2019    Reason for Visit: follow up of multiple myeloma  Currently on Revlimid and dexamethasone  With Zometa.    Interval History:  The patient has been tolerating Revlimid and dexamethasone well.  She had intermittent diarrhea on and off for about a week or so.  She did not notify her provider or us  about it.  She took Imodium yesterday and patient reports no diarrhea since then.  She denies blood in stool fever chills sweats abdominal pain nausea vomiting.  She tolerates p.o. well.  She was drinking plenty of fluids      Review of Systems:  14 point ROS of systems including Constitutional, Eyes, Respiratory, Cardiovascular, Gastroenterology, Genitourinary, Integumentary, Muscularskeletal, Psychiatric were all negative except for pertinent " positives noted in my HPI.      Current Outpatient Medications   Medication Sig Dispense Refill     acyclovir (ZOVIRAX) 400 MG tablet Take 1 tablet (400 mg) by mouth 2 times daily Viral Prophylaxis. 60 tablet 5     aspirin 81 MG chewable tablet Take 81 mg by mouth daily        dexamethasone (DECADRON) 4 MG tablet Take 5 tablets (20 mg) by mouth once a week Once a week with food on Days 1,8,15. 20 tablet 0     dexamethasone (DECADRON) 4 MG tablet Take 20 mg by mouth once a week Once a week with food on Days 1,8,15.. 20 tablet 0     escitalopram (LEXAPRO) 5 MG tablet TAKE 1 TABLET BY MOUTH DAILY 90 tablet 0     lisinopril (PRINIVIL/ZESTRIL) 10 MG tablet Take 1 tablet (10 mg) by mouth daily 90 tablet 2     Loperamide HCl (IMODIUM A-D PO) Take by mouth as needed       LORazepam (ATIVAN) 0.5 MG tablet Take 1 tablet (0.5 mg) by mouth every 8 hours as needed (Anxiety, Nausea/Vomiting or Sleep) 30 tablet 3     mirabegron (MYRBETRIQ) 25 MG 24 hr tablet Take 1 tablet (25 mg) by mouth daily 90 tablet 3     nitroglycerin (NITROSTAT) 0.4 MG sublingual tablet For chest pain place 1 tablet under the tongue every 5 minutes for 3 doses. If symptoms persist 5 minutes after 1st dose call 911. 25 tablet 0     order for DME Wheelchair. 1 Units 0     order for DME Dispense one 4 wheeled walker with hand brakes and seat 1 Units 0     prochlorperazine (COMPAZINE) 10 MG tablet Take 1 tablet (10 mg) by mouth every 6 hours as needed (Nausea/Vomiting) 30 tablet 3     Zoledronic Acid (ZOMETA IV)        LENalidomide (REVLIMID) 10 MG CAPS capsule CHEMO Take 1 capsule (10 mg) by mouth daily for 14 days Take on Days 1 through 14 of 21-day cycle. 14 capsule 0     LENalidomide (REVLIMID) 10 MG CAPS capsule CHEMO Take 1 capsule (10 mg) by mouth daily for 14 days Take on Days 1 through 14 of 21-day cycle. 14 capsule 0       Physical Examination:  General: The patient is a pleasant female in no acute distress.  /66   Pulse 93   Temp 98.4  F  "(36.9  C) (Oral)   Resp 16   Ht 1.575 m (5' 2\")   Wt 72.2 kg (159 lb 3.2 oz)   SpO2 94%   BMI 29.12 kg/m     HEENT: EOMI, PERRL. Sclerae are anicteric. Oral mucosa is pink and moist with no lesions or thrush.   Lymph: Neck is supple with no lymphadenopathy in the cervical or supraclavicular areas.   Heart: Regular rate and rhythm.   Lungs: Clear to auscultation bilaterally.   GI: Bowel sounds present, soft, nontender with no palpable hepatosplenomegaly or masses.   Extremities: No lower extremity edema noted bilaterally.   Skin: No rashes, petechiae, or bruising noted on exposed skin.    Laboratory Data:  Results for orders placed or performed in visit on 06/03/19 (from the past 24 hour(s))   Comprehensive metabolic panel   Result Value Ref Range    Sodium 142 133 - 144 mmol/L    Potassium 3.6 3.4 - 5.3 mmol/L    Chloride 108 94 - 109 mmol/L    Carbon Dioxide 26 20 - 32 mmol/L    Anion Gap 8 3 - 14 mmol/L    Glucose 126 (H) 70 - 99 mg/dL    Urea Nitrogen 15 7 - 30 mg/dL    Creatinine 0.63 0.52 - 1.04 mg/dL    GFR Estimate 83 >60 mL/min/[1.73_m2]    GFR Estimate If Black >90 >60 mL/min/[1.73_m2]    Calcium 9.1 8.5 - 10.1 mg/dL    Bilirubin Total 0.7 0.2 - 1.3 mg/dL    Albumin 3.5 3.4 - 5.0 g/dL    Protein Total 6.9 6.8 - 8.8 g/dL    Alkaline Phosphatase 81 40 - 150 U/L    ALT 21 0 - 50 U/L    AST 11 0 - 45 U/L   CBC with platelets differential   Result Value Ref Range    WBC 4.7 4.0 - 11.0 10e9/L    RBC Count 4.33 3.8 - 5.2 10e12/L    Hemoglobin 13.4 11.7 - 15.7 g/dL    Hematocrit 38.5 35.0 - 47.0 %    MCV 89 78 - 100 fl    MCH 30.9 26.5 - 33.0 pg    MCHC 34.8 31.5 - 36.5 g/dL    RDW 13.3 10.0 - 15.0 %    Platelet Count 200 150 - 450 10e9/L    Diff Method Automated Method     % Neutrophils 83.3 %    % Lymphocytes 13.9 %    % Monocytes 1.1 %    % Eosinophils 0.2 %    % Basophils 0.2 %    % Immature Granulocytes 1.3 %    Nucleated RBCs 0 0 /100    Absolute Neutrophil 3.9 1.6 - 8.3 10e9/L    Absolute Lymphocytes " 0.7 (L) 0.8 - 5.3 10e9/L    Absolute Monocytes 0.1 0.0 - 1.3 10e9/L    Absolute Eosinophils 0.0 0.0 - 0.7 10e9/L    Absolute Basophils 0.0 0.0 - 0.2 10e9/L    Abs Immature Granulocytes 0.1 0 - 0.4 10e9/L    Absolute Nucleated RBC 0.0          Assessment and Plan:    This is a 83-year-old female with    Multiple myeloma  Stable disease  Patient is on Revlimid 10 mg days 1 through 14 of 21-day cycle and dexamethasone 20 mg once a week  Labs reviewed  Continue with Revlimid and dexamethasone  Patient has scheduled follow-up appointment with Dr. Parry next month    Prophylaxis  Continue with acyclovir 400 mg p.o. twice daily  Continue with aspirin 81      Bone health  Zometa every 3 months  Next one due in July.      Diarrhea  Resolved with 1 dose of Imodium  Instructed patient to call clinic if diarrhea returns.        MARIUSZ Mckeon CNP  Hem/Onc   [facility]     Chart documentation with Dragon Voice recognition Software. Although reviewed after completion, some words and grammatical errors may remain.          Again, thank you for allowing me to participate in the care of your patient.        Sincerely,        MARIUSZ Mckeon CNP

## 2019-06-03 NOTE — PROGRESS NOTES
Oncology/Hematology Visit Note  Osmin 3, 2019    Reason for Visit: follow up of multiple myeloma  Currently on Revlimid and dexamethasone  With Zometa.    Interval History:  The patient has been tolerating Revlimid and dexamethasone well.  She had intermittent diarrhea on and off for about a week or so.  She did not notify her provider or us  about it.  She took Imodium yesterday and patient reports no diarrhea since then.  She denies blood in stool fever chills sweats abdominal pain nausea vomiting.  She tolerates p.o. well.  She was drinking plenty of fluids      Review of Systems:  14 point ROS of systems including Constitutional, Eyes, Respiratory, Cardiovascular, Gastroenterology, Genitourinary, Integumentary, Muscularskeletal, Psychiatric were all negative except for pertinent positives noted in my HPI.      Current Outpatient Medications   Medication Sig Dispense Refill     acyclovir (ZOVIRAX) 400 MG tablet Take 1 tablet (400 mg) by mouth 2 times daily Viral Prophylaxis. 60 tablet 5     aspirin 81 MG chewable tablet Take 81 mg by mouth daily        dexamethasone (DECADRON) 4 MG tablet Take 5 tablets (20 mg) by mouth once a week Once a week with food on Days 1,8,15. 20 tablet 0     dexamethasone (DECADRON) 4 MG tablet Take 20 mg by mouth once a week Once a week with food on Days 1,8,15.. 20 tablet 0     escitalopram (LEXAPRO) 5 MG tablet TAKE 1 TABLET BY MOUTH DAILY 90 tablet 0     lisinopril (PRINIVIL/ZESTRIL) 10 MG tablet Take 1 tablet (10 mg) by mouth daily 90 tablet 2     Loperamide HCl (IMODIUM A-D PO) Take by mouth as needed       LORazepam (ATIVAN) 0.5 MG tablet Take 1 tablet (0.5 mg) by mouth every 8 hours as needed (Anxiety, Nausea/Vomiting or Sleep) 30 tablet 3     mirabegron (MYRBETRIQ) 25 MG 24 hr tablet Take 1 tablet (25 mg) by mouth daily 90 tablet 3     nitroglycerin (NITROSTAT) 0.4 MG sublingual tablet For chest pain place 1 tablet under the tongue every 5 minutes for 3 doses. If symptoms persist  "5 minutes after 1st dose call 911. 25 tablet 0     order for DME Wheelchair. 1 Units 0     order for DME Dispense one 4 wheeled walker with hand brakes and seat 1 Units 0     prochlorperazine (COMPAZINE) 10 MG tablet Take 1 tablet (10 mg) by mouth every 6 hours as needed (Nausea/Vomiting) 30 tablet 3     Zoledronic Acid (ZOMETA IV)        LENalidomide (REVLIMID) 10 MG CAPS capsule CHEMO Take 1 capsule (10 mg) by mouth daily for 14 days Take on Days 1 through 14 of 21-day cycle. 14 capsule 0     LENalidomide (REVLIMID) 10 MG CAPS capsule CHEMO Take 1 capsule (10 mg) by mouth daily for 14 days Take on Days 1 through 14 of 21-day cycle. 14 capsule 0       Physical Examination:  General: The patient is a pleasant female in no acute distress.  /66   Pulse 93   Temp 98.4  F (36.9  C) (Oral)   Resp 16   Ht 1.575 m (5' 2\")   Wt 72.2 kg (159 lb 3.2 oz)   SpO2 94%   BMI 29.12 kg/m    HEENT: EOMI, PERRL. Sclerae are anicteric. Oral mucosa is pink and moist with no lesions or thrush.   Lymph: Neck is supple with no lymphadenopathy in the cervical or supraclavicular areas.   Heart: Regular rate and rhythm.   Lungs: Clear to auscultation bilaterally.   GI: Bowel sounds present, soft, nontender with no palpable hepatosplenomegaly or masses.   Extremities: No lower extremity edema noted bilaterally.   Skin: No rashes, petechiae, or bruising noted on exposed skin.    Laboratory Data:  Results for orders placed or performed in visit on 06/03/19 (from the past 24 hour(s))   Comprehensive metabolic panel   Result Value Ref Range    Sodium 142 133 - 144 mmol/L    Potassium 3.6 3.4 - 5.3 mmol/L    Chloride 108 94 - 109 mmol/L    Carbon Dioxide 26 20 - 32 mmol/L    Anion Gap 8 3 - 14 mmol/L    Glucose 126 (H) 70 - 99 mg/dL    Urea Nitrogen 15 7 - 30 mg/dL    Creatinine 0.63 0.52 - 1.04 mg/dL    GFR Estimate 83 >60 mL/min/[1.73_m2]    GFR Estimate If Black >90 >60 mL/min/[1.73_m2]    Calcium 9.1 8.5 - 10.1 mg/dL    Bilirubin " Total 0.7 0.2 - 1.3 mg/dL    Albumin 3.5 3.4 - 5.0 g/dL    Protein Total 6.9 6.8 - 8.8 g/dL    Alkaline Phosphatase 81 40 - 150 U/L    ALT 21 0 - 50 U/L    AST 11 0 - 45 U/L   CBC with platelets differential   Result Value Ref Range    WBC 4.7 4.0 - 11.0 10e9/L    RBC Count 4.33 3.8 - 5.2 10e12/L    Hemoglobin 13.4 11.7 - 15.7 g/dL    Hematocrit 38.5 35.0 - 47.0 %    MCV 89 78 - 100 fl    MCH 30.9 26.5 - 33.0 pg    MCHC 34.8 31.5 - 36.5 g/dL    RDW 13.3 10.0 - 15.0 %    Platelet Count 200 150 - 450 10e9/L    Diff Method Automated Method     % Neutrophils 83.3 %    % Lymphocytes 13.9 %    % Monocytes 1.1 %    % Eosinophils 0.2 %    % Basophils 0.2 %    % Immature Granulocytes 1.3 %    Nucleated RBCs 0 0 /100    Absolute Neutrophil 3.9 1.6 - 8.3 10e9/L    Absolute Lymphocytes 0.7 (L) 0.8 - 5.3 10e9/L    Absolute Monocytes 0.1 0.0 - 1.3 10e9/L    Absolute Eosinophils 0.0 0.0 - 0.7 10e9/L    Absolute Basophils 0.0 0.0 - 0.2 10e9/L    Abs Immature Granulocytes 0.1 0 - 0.4 10e9/L    Absolute Nucleated RBC 0.0          Assessment and Plan:    This is a 83-year-old female with    Multiple myeloma  Stable disease  Patient is on Revlimid 10 mg days 1 through 14 of 21-day cycle and dexamethasone 20 mg once a week  Labs reviewed  Continue with Revlimid and dexamethasone  Patient has scheduled follow-up appointment with Dr. Parry next month    Prophylaxis  Continue with acyclovir 400 mg p.o. twice daily  Continue with aspirin 81      Bone health  Zometa every 3 months  Next one due in July.      Diarrhea  Resolved with 1 dose of Imodium  Instructed patient to call clinic if diarrhea returns.        MARIUSZ Mckeon CNP  Hem/Onc   [facility]     Chart documentation with Dragon Voice recognition Software. Although reviewed after completion, some words and grammatical errors may remain.

## 2019-06-03 NOTE — PROGRESS NOTES
"Oncology Rooming Note    Cortney 3, 2019 1:34 PM   Quiana Dunaway is a 83 year old female who presents for:    Chief Complaint   Patient presents with     Oncology Clinic Visit     Initial Vitals: /66   Pulse 93   Temp 98.4  F (36.9  C) (Oral)   Resp 16   Ht 1.575 m (5' 2\")   SpO2 94%   BMI 28.75 kg/m   Estimated body mass index is 28.75 kg/m  as calculated from the following:    Height as of this encounter: 1.575 m (5' 2\").    Weight as of 5/9/19: 71.3 kg (157 lb 3.2 oz). Body surface area is 1.77 meters squared.  Moderate Pain (4) Comment: Data Unavailable   No LMP recorded. Patient is postmenopausal.  Allergies reviewed: Yes  Medications reviewed: Yes    Medications: Medication refills needed today.    Clinical concerns: Needs refills of Decadron and Compazine. Uses [facility]. Also has an issue with her mouth and tongue.       Lauren Arredondo, [facility]            "

## 2019-06-03 NOTE — PROGRESS NOTES
Medical Assistant Note:  Quiana Dunaway presents today for BLOOD DRAW.    Patient seen by provider today: Yes: Juaquin.   present during visit today: Not Applicable.    Concerns: No Concerns.    Procedure:  Lab draw site: LAC, Needle type: BF, Gauge: 23.    Post Assessment:  Labs drawn without difficulty: Yes.    Discharge Plan:  Departure Mode: Ambulatory.    Face to Face Time: 5MIN.    Mariela Lyn

## 2019-06-03 NOTE — TELEPHONE ENCOUNTER
Pt would be considered immune d/t age, correct?     Would you recommend titer?     Thank you,   Aliza THOMAS RN

## 2019-06-04 ENCOUNTER — TELEPHONE (OUTPATIENT)
Dept: INFUSION THERAPY | Facility: CLINIC | Age: 84
End: 2019-06-04

## 2019-06-04 NOTE — TELEPHONE ENCOUNTER
Oral Chemotherapy Monitoring Program.     Patient currently on Revlimid therapy.     Reviewed lab results from 6/3/19.     There were no concerning abnormalities.     Will follow up in 1 month      ORAL CHEMOTHERAPY 8/14/2017 9/18/2017 10/16/2017 5/7/2018 6/4/2018 7/9/2018 6/4/2019   Drug Name Revlimid (Lenalidomide) Revlimid (Lenalidomide) Revlimid (Lenalidomide) Revlimid (Lenalidomide) Revlimid (Lenalidomide) Revlimid (Lenalidomide) Revlimid (Lenalidomide)   Current Dosage 10mg 10mg 10mg 10mg 10mg 10mg 10mg   Current Schedule Daily Daily Daily Daily Daily Daily Daily   Cycle Details 2 weeks on 1 week off 2 weeks on 1 week off 2 weeks on 1 week off 3 weeks on 1 week off 3 weeks on 1 week off 2 weeks on 1 week off 2 weeks on 1 week off   Start Date of Last Cycle 8/14/2017 9/5/2017 10/2/2017 5/7/2018 6/4/2018 7/1/2018 -   Planned next cycle start date - 10/2/2017 10/23/2017 6/4/2018 7/2/2018 7/22/2018 -   Doses missed in last 2 weeks - more 0 0 0 0 -   Adherence Assessment - Adherent Adherent - Adherent Adherent -   Adverse Effects - Fatigue Fatigue - No AE identified during assessment Fatigue -   Fatigue - - - - - (No Data) -   Pharmacist Intervention(fatigue) - - - - - Yes -   Intervention(s) - - - - - Dosing interval changed (chemo) -   Home BPs - - - - Not applicable Not applicable -   Any new drug interactions? - No No No No No No   Is the dose as ordered appropriate for the patient? - No No Yes Yes Yes Yes   Is the patient currently in pain? - - - - Assessed in last 30 days. Assessed in last 30 days. Assessed in last 30 days.   Has the patient been assessed within the past 6 months for depression? - - - - Yes Yes Yes   Has the patient missed any days of school, work, or other routine activity? - - - - No No -       Vitals:  BP:   BP Readings from Last 1 Encounters:   06/03/19 128/66     Wt Readings from Last 1 Encounters:   06/03/19 72.2 kg (159 lb 3.2 oz)     Estimated body surface area is 1.78 meters squared  "as calculated from the following:    Height as of 6/3/19: 1.575 m (5' 2\").    Weight as of 6/3/19: 72.2 kg (159 lb 3.2 oz).    Labs:  _  Result Component Current Result Ref Range   Sodium 142 (6/3/2019) 133 - 144 mmol/L     _  Result Component Current Result Ref Range   Potassium 3.6 (6/3/2019) 3.4 - 5.3 mmol/L     _  Result Component Current Result Ref Range   Calcium 9.1 (6/3/2019) 8.5 - 10.1 mg/dL     No results found for Mag within last 30 days.     No results found for Phos within last 30 days.     _  Result Component Current Result Ref Range   Albumin 3.5 (6/3/2019) 3.4 - 5.0 g/dL     _  Result Component Current Result Ref Range   Urea Nitrogen 15 (6/3/2019) 7 - 30 mg/dL     _  Result Component Current Result Ref Range   Creatinine 0.63 (6/3/2019) 0.52 - 1.04 mg/dL       _  Result Component Current Result Ref Range   AST 11 (6/3/2019) 0 - 45 U/L     _  Result Component Current Result Ref Range   ALT 21 (6/3/2019) 0 - 50 U/L     _  Result Component Current Result Ref Range   Bilirubin Total 0.7 (6/3/2019) 0.2 - 1.3 mg/dL       _  Result Component Current Result Ref Range   WBC 4.7 (6/3/2019) 4.0 - 11.0 10e9/L     _  Result Component Current Result Ref Range   Hemoglobin 13.4 (6/3/2019) 11.7 - 15.7 g/dL     _  Result Component Current Result Ref Range   Platelet Count 200 (6/3/2019) 150 - 450 10e9/L     _  Result Component Current Result Ref Range   Absolute Neutrophil 3.9 (6/3/2019) 1.6 - 8.3 10e9/L       Assessment/Plan:  Continue same dose as ordered    Follow-Up:  1 month    Martinez Gallardo, PharmD, BCOP  June 4, 2019    "

## 2019-06-07 DIAGNOSIS — C90.00 MULTIPLE MYELOMA NOT HAVING ACHIEVED REMISSION (H): ICD-10-CM

## 2019-06-09 DIAGNOSIS — I10 BENIGN ESSENTIAL HYPERTENSION: ICD-10-CM

## 2019-06-10 DIAGNOSIS — C90.00 MULTIPLE MYELOMA NOT HAVING ACHIEVED REMISSION (H): Primary | ICD-10-CM

## 2019-06-10 RX ORDER — DEXAMETHASONE 4 MG/1
20 TABLET ORAL WEEKLY
Qty: 20 TABLET | Refills: 0 | Status: SHIPPED | OUTPATIENT
Start: 2019-06-10 | End: 2019-08-07

## 2019-06-10 RX ORDER — LENALIDOMIDE 10 MG/1
10 CAPSULE ORAL DAILY
Qty: 14 CAPSULE | Refills: 0 | Status: SHIPPED | OUTPATIENT
Start: 2019-06-10 | End: 2019-08-07

## 2019-06-10 NOTE — TELEPHONE ENCOUNTER
lisinopril (PRINIVIL/ZESTRIL) 10 MG tablet 90 tablet 2 9/14/2018     Last Written Prescription Date:  9/14/2018  Last Fill Quantity: 90,  # refills: 2   Last office visit: 5/1/2019 with prescribing provider:  CHANO Chung   Future Office Visit:   Next 5 appointments (look out 90 days)    Jun 17, 2019  1:00 PM CDT  [facility]ar [facility] [address] [facility] [address]  [phone]   Jul 09, 2019  1:50 PM CDT  Return Visit with  LAB DRAW 1  [facility] ([facility]  [address]  [place] [address]  [phone]   Jul 09, 2019  2:20 PM CDT  Return Visit with Alex Parry MD  [facility] ([facility]  [address]  [place] [address]  [phone]   Aug 06, 2019  1:30 PM CDT  Return Visit with  LAB DRAW 1  [facility] ([facility]) [facility]  [address]  [place] [address]  [phone]   Aug 06, 2019  2:20 PM CDT  Return Visit with Alex Parry MD  [facility] ([facility]  [address]  [facility] [address]  [phone]         Requested Prescriptions   Pending Prescriptions Disp Refills     lisinopril (PRINIVIL/ZESTRIL) 10 MG tablet [Pharmacy Med Name: LISINOPRIL 10MG TABLETS] 90 tablet 0     Sig: TAKE 1 TABLET BY MOUTH DAILY       ACE Inhibitors (Including Combos) Protocol Passed - 6/9/2019  3:57 AM        Passed - Blood pressure under 140/90 in past 12 months     BP Readings from Last 3 Encounters:   06/03/19 128/66   05/09/19 121/69   05/09/19 121/69                 Passed - Recent (12 mo) or future (30 days) visit within the authorizing provider's specialty     Patient had office visit in the last 12 months or  "has a visit in the next 30 days with authorizing provider or within the authorizing provider's specialty.  See \"Patient Info\" tab in inbasket, or \"Choose Columns\" in Meds & Orders section of the refill encounter.              Passed - Medication is active on med list        Passed - Patient is age 18 or older        Passed - No active pregnancy on record        Passed - Normal serum creatinine on file in past 12 months     Recent Labs   Lab Test 06/03/19  1334  07/24/17  0827   CR 0.63   < >  --    CREAT  --   --  0.7    < > = values in this interval not displayed.             Passed - Normal serum potassium on file in past 12 months     Recent Labs   Lab Test 06/03/19  1334   POTASSIUM 3.6             Passed - No positive pregnancy test within past 12 months          "

## 2019-06-11 RX ORDER — LISINOPRIL 10 MG/1
TABLET ORAL
Qty: 90 TABLET | Refills: 0 | Status: SHIPPED | OUTPATIENT
Start: 2019-06-11 | End: 2019-09-11

## 2019-06-11 NOTE — TELEPHONE ENCOUNTER
Prescription approved per [facility].  Latanya Hayden RN

## 2019-06-17 ENCOUNTER — OFFICE VISIT (OUTPATIENT)
Dept: FAMILY MEDICINE | Facility: CLINIC | Age: 84
End: 2019-06-17
Payer: MEDICARE

## 2019-06-17 VITALS
DIASTOLIC BLOOD PRESSURE: 64 MMHG | BODY MASS INDEX: 28.8 KG/M2 | OXYGEN SATURATION: 94 % | WEIGHT: 156.5 LBS | SYSTOLIC BLOOD PRESSURE: 124 MMHG | HEART RATE: 87 BPM | HEIGHT: 62 IN | TEMPERATURE: 98.7 F

## 2019-06-17 DIAGNOSIS — C90.00 MULTIPLE MYELOMA NOT HAVING ACHIEVED REMISSION (H): ICD-10-CM

## 2019-06-17 DIAGNOSIS — I10 BENIGN ESSENTIAL HYPERTENSION: ICD-10-CM

## 2019-06-17 DIAGNOSIS — R19.7 DIARRHEA, UNSPECIFIED TYPE: Primary | ICD-10-CM

## 2019-06-17 PROCEDURE — 99213 OFFICE O/P EST LOW 20 MIN: CPT | Performed by: INTERNAL MEDICINE

## 2019-06-17 ASSESSMENT — MIFFLIN-ST. JEOR: SCORE: 1123.13

## 2019-06-17 NOTE — PROGRESS NOTES
Subjective     Quiana Dunaway is a 83 year old female who presents to clinic today for the following health issues:    HPI   Hypertension Follow-up      Do you check your blood pressure regularly outside of the clinic? No     Are you following a low salt diet? Yes    Are your blood pressures ever more than 140 on the top number (systolic) OR more   than 90 on the bottom number (diastolic), for example 140/90? No    Amount of exercise or physical activity: None    Problems taking medications regularly: No    Medication side effects: none    Diet: regular (no restrictions)      Pleasant 83-year-old female with a history of multiple myeloma, hypertension, generalized anxiety with a pacemaker in place.  Her myeloma is responding to therapy.  She complains of intermittent nonbloody diarrhea.  Symptoms improve when she takes Imodium.  Episodes occur about every 3 days.  She does not pass formed stools in between diarrhea episodes.    Patient Active Problem List   Diagnosis     Benign essential hypertension     Multiple myeloma not having achieved remission (H)     Benzodiazepine overdose, accidental or unintentional, initial encounter     Hypokalemia     Pacemaker     ROBER (generalized anxiety disorder)     Past Surgical History:   Procedure Laterality Date     BONE MARROW BIOPSY, BONE SPECIMEN, NEEDLE/TROCAR N/A 7/12/2017    Procedure: BIOPSY BONE MARROW;  BONE MARROW BIOPSY ;  Surgeon: Grace Vela MD;  Location:  GI     IMPLANT PACEMAKER  03/2017    AV block       Social History     Tobacco Use     Smoking status: Never Smoker     Smokeless tobacco: Never Used   Substance Use Topics     Alcohol use: No     Alcohol/week: 0.0 oz     Family History   Problem Relation Age of Onset     Unknown/Adopted Mother      Unknown/Adopted Father          Current Outpatient Medications   Medication Sig Dispense Refill     acyclovir (ZOVIRAX) 400 MG tablet Take 1 tablet (400 mg) by mouth 2 times daily Viral Prophylaxis.  60 tablet 5     aspirin 81 MG chewable tablet Take 81 mg by mouth daily        dexamethasone (DECADRON) 4 MG tablet Take 5 tablets (20 mg) by mouth once a week Once a week with food on Days 1,8,15. 20 tablet 0     dexamethasone (DECADRON) 4 MG tablet Take 5 tablets (20 mg) by mouth once a week Once a week with food on Days 1,8,15. 20 tablet 0     dexamethasone (DECADRON) 4 MG tablet Take 20 mg by mouth once a week Once a week with food on Days 1,8,15.. 20 tablet 0     escitalopram (LEXAPRO) 5 MG tablet TAKE 1 TABLET BY MOUTH DAILY 90 tablet 0     LENalidomide (REVLIMID) 10 MG CAPS capsule CHEMO Take 1 capsule (10 mg) by mouth daily for 14 days Take on Days 1 through 14 of 21-day cycle. 14 capsule 0     lisinopril (PRINIVIL/ZESTRIL) 10 MG tablet TAKE 1 TABLET BY MOUTH DAILY 90 tablet 0     Loperamide HCl (IMODIUM A-D PO) Take by mouth as needed       LORazepam (ATIVAN) 0.5 MG tablet Take 1 tablet (0.5 mg) by mouth every 8 hours as needed (Anxiety, Nausea/Vomiting or Sleep) 30 tablet 3     mirabegron (MYRBETRIQ) 25 MG 24 hr tablet Take 1 tablet (25 mg) by mouth daily 90 tablet 3     nitroglycerin (NITROSTAT) 0.4 MG sublingual tablet For chest pain place 1 tablet under the tongue every 5 minutes for 3 doses. If symptoms persist 5 minutes after 1st dose call 911. 25 tablet 0     order for DME Wheelchair. 1 Units 0     order for DME Dispense one 4 wheeled walker with hand brakes and seat 1 Units 0     prochlorperazine (COMPAZINE) 10 MG tablet Take 1 tablet (10 mg) by mouth every 6 hours as needed (Nausea/Vomiting) 30 tablet 3     Zoledronic Acid (ZOMETA IV)        LENalidomide (REVLIMID) 10 MG CAPS capsule CHEMO Take 1 capsule (10 mg) by mouth daily for 14 days Take on Days 1 through 14 of 21-day cycle. 14 capsule 0     LENalidomide (REVLIMID) 10 MG CAPS capsule CHEMO Take 1 capsule (10 mg) by mouth daily for 14 days Take on Days 1 through 14 of 21-day cycle. 14 capsule 0     No Known Allergies    Reviewed and updated as  "needed this visit by Provider         Review of Systems   ROS COMP: Constitutional, HEENT, cardiovascular, pulmonary, gi and gu systems are negative, except as otherwise noted.      Objective    /64 (BP Location: Right arm, Patient Position: Sitting, Cuff Size: Adult Regular)   Pulse 87   Temp 98.7  F (37.1  C) (Oral)   Ht 1.575 m (5' 2\")   Wt 71 kg (156 lb 8 oz)   SpO2 94%   BMI 28.62 kg/m    Body mass index is 28.62 kg/m .  Physical Exam   GENERAL: healthy, alert and no distress  RESP: lungs clear to auscultation - no rales, rhonchi or wheezes  CV: Heart with regular rate and rhythm.   ABDOMEN: soft, nontender, no hepatosplenomegaly, no masses and bowel sounds normal  MS: no gross musculoskeletal defects noted, no edema  NEURO: Normal strength and tone, mentation intact and speech normal  PSYCH: mentation appears normal, affect normal/bright            Assessment & Plan     1. Diarrhea, unspecified type  Refer to patient instructions  - Clostridium difficile Toxin B PCR; Future  - Enteric Bacteria and Virus Panel by [facility] Stool; Future    2. Benign essential hypertension  Under good control    3. Multiple myeloma not having achieved remission (H)  Continue follow-up with oncology as directed       BMI:   Estimated body mass index is 28.62 kg/m  as calculated from the following:    Height as of this encounter: 1.575 m (5' 2\").    Weight as of this encounter: 71 kg (156 lb 8 oz).   Weight management plan: Discussed healthy diet and exercise guidelines        Patient Instructions   Your diarrhea might be from lactose in dairy products.  Try lactose-free milk.  Also, you can use Lactaid tablets as directed to help you digest dairy products better.  Also, artifical sweeteners such as xylocaine, sucrose, sweet n' low, diet pop can also be a common cause of diarrhea.  If eliminating these items does not resolve your diarrhea, then submit a stool sample to exclude an infectious cause for diarrhea.        Return " in about 6 weeks (around 7/29/2019) for Blood Pressure Check.    César Chung MD  [facility]

## 2019-06-17 NOTE — PATIENT INSTRUCTIONS
Your diarrhea might be from lactose in dairy products.  Try lactose-free milk.  Also, you can use Lactaid tablets as directed to help you digest dairy products better.  Also, artifical sweeteners such as xylocaine, sucrose, sweet n' low, diet pop can also be a common cause of diarrhea.  If eliminating these items does not resolve your diarrhea, then submit a stool sample to exclude an infectious cause for diarrhea.

## 2019-06-27 DIAGNOSIS — C90.00 MULTIPLE MYELOMA NOT HAVING ACHIEVED REMISSION (H): ICD-10-CM

## 2019-07-02 DIAGNOSIS — C90.00 MULTIPLE MYELOMA NOT HAVING ACHIEVED REMISSION (H): Primary | ICD-10-CM

## 2019-07-02 RX ORDER — DEXAMETHASONE 4 MG/1
20 TABLET ORAL WEEKLY
Qty: 20 TABLET | Refills: 0 | Status: SHIPPED | OUTPATIENT
Start: 2019-07-02 | End: 2019-08-07

## 2019-07-02 RX ORDER — LENALIDOMIDE 10 MG/1
10 CAPSULE ORAL DAILY
Qty: 14 CAPSULE | Refills: 0 | Status: SHIPPED | OUTPATIENT
Start: 2019-07-02 | End: 2019-08-07

## 2019-07-09 ENCOUNTER — ONCOLOGY VISIT (OUTPATIENT)
Dept: ONCOLOGY | Facility: CLINIC | Age: 84
End: 2019-07-09
Attending: INTERNAL MEDICINE
Payer: MEDICARE

## 2019-07-09 ENCOUNTER — DOCUMENTATION ONLY (OUTPATIENT)
Dept: PHARMACY | Facility: CLINIC | Age: 84
End: 2019-07-09

## 2019-07-09 ENCOUNTER — HOSPITAL ENCOUNTER (OUTPATIENT)
Facility: CLINIC | Age: 84
Setting detail: SPECIMEN
Discharge: HOME | End: 2019-07-09
Attending: INTERNAL MEDICINE | Admitting: INTERNAL MEDICINE
Payer: MEDICARE

## 2019-07-09 ENCOUNTER — INFUSION THERAPY VISIT (OUTPATIENT)
Dept: INFUSION THERAPY | Facility: CLINIC | Age: 84
End: 2019-07-09
Attending: INTERNAL MEDICINE
Payer: MEDICARE

## 2019-07-09 VITALS
HEART RATE: 87 BPM | BODY MASS INDEX: 28.34 KG/M2 | HEIGHT: 62 IN | WEIGHT: 154 LBS | RESPIRATION RATE: 16 BRPM | SYSTOLIC BLOOD PRESSURE: 115 MMHG | TEMPERATURE: 98.4 F | DIASTOLIC BLOOD PRESSURE: 76 MMHG | OXYGEN SATURATION: 96 %

## 2019-07-09 DIAGNOSIS — C90.00 MULTIPLE MYELOMA NOT HAVING ACHIEVED REMISSION (H): ICD-10-CM

## 2019-07-09 LAB
ACANTHOCYTES BLD QL SMEAR: SLIGHT
ALBUMIN SERPL-MCNC: 3.4 G/DL (ref 3.4–5)
ALP SERPL-CCNC: 78 U/L (ref 40–150)
ALT SERPL W P-5'-P-CCNC: 17 U/L (ref 0–50)
ANION GAP SERPL CALCULATED.3IONS-SCNC: 6 MMOL/L (ref 3–14)
AST SERPL W P-5'-P-CCNC: 9 U/L (ref 0–45)
BASOPHILS # BLD AUTO: 0 10E9/L (ref 0–0.2)
BASOPHILS NFR BLD AUTO: 0 %
BILIRUB SERPL-MCNC: 0.5 MG/DL (ref 0.2–1.3)
BUN SERPL-MCNC: 15 MG/DL (ref 7–30)
BURR CELLS BLD QL SMEAR: SLIGHT
CALCIUM SERPL-MCNC: 8.8 MG/DL (ref 8.5–10.1)
CHLORIDE SERPL-SCNC: 107 MMOL/L (ref 94–109)
CO2 SERPL-SCNC: 29 MMOL/L (ref 20–32)
CREAT SERPL-MCNC: 0.66 MG/DL (ref 0.52–1.04)
DIFFERENTIAL METHOD BLD: NORMAL
ELLIPTOCYTES BLD QL SMEAR: SLIGHT
EOSINOPHIL # BLD AUTO: 0.1 10E9/L (ref 0–0.7)
EOSINOPHIL NFR BLD AUTO: 2 %
ERYTHROCYTE [DISTWIDTH] IN BLOOD BY AUTOMATED COUNT: 13.2 % (ref 10–15)
GFR SERPL CREATININE-BSD FRML MDRD: 82 ML/MIN/{1.73_M2}
GLUCOSE SERPL-MCNC: 70 MG/DL (ref 70–99)
HCT VFR BLD AUTO: 36.9 % (ref 35–47)
HGB BLD-MCNC: 13 G/DL (ref 11.7–15.7)
LYMPHOCYTES # BLD AUTO: 2.3 10E9/L (ref 0.8–5.3)
LYMPHOCYTES NFR BLD AUTO: 46 %
MCH RBC QN AUTO: 31.1 PG (ref 26.5–33)
MCHC RBC AUTO-ENTMCNC: 35.2 G/DL (ref 31.5–36.5)
MCV RBC AUTO: 88 FL (ref 78–100)
MICROCYTES BLD QL SMEAR: PRESENT
MONOCYTES # BLD AUTO: 0.5 10E9/L (ref 0–1.3)
MONOCYTES NFR BLD AUTO: 9 %
NEUTROPHILS # BLD AUTO: 2.2 10E9/L (ref 1.6–8.3)
NEUTROPHILS NFR BLD AUTO: 43 %
PLATELET # BLD AUTO: 168 10E9/L (ref 150–450)
PLATELET # BLD EST: NORMAL 10*3/UL
POIKILOCYTOSIS BLD QL SMEAR: NORMAL
POTASSIUM SERPL-SCNC: 3.4 MMOL/L (ref 3.4–5.3)
PROT SERPL-MCNC: 6.5 G/DL (ref 6.8–8.8)
RBC # BLD AUTO: 4.18 10E12/L (ref 3.8–5.2)
RBC INCLUSIONS BLD: SLIGHT
SODIUM SERPL-SCNC: 142 MMOL/L (ref 133–144)
WBC # BLD AUTO: 5.1 10E9/L (ref 4–11)

## 2019-07-09 PROCEDURE — 36415 COLL VENOUS BLD VENIPUNCTURE: CPT

## 2019-07-09 PROCEDURE — 99214 OFFICE O/P EST MOD 30 MIN: CPT | Performed by: INTERNAL MEDICINE

## 2019-07-09 PROCEDURE — G0463 HOSPITAL OUTPT CLINIC VISIT: HCPCS

## 2019-07-09 PROCEDURE — 80053 COMPREHEN METABOLIC PANEL: CPT | Performed by: INTERNAL MEDICINE

## 2019-07-09 PROCEDURE — 85025 COMPLETE CBC W/AUTO DIFF WBC: CPT | Performed by: INTERNAL MEDICINE

## 2019-07-09 ASSESSMENT — MIFFLIN-ST. JEOR: SCORE: 1111.79

## 2019-07-09 ASSESSMENT — PAIN SCALES - GENERAL: PAINLEVEL: NO PAIN (0)

## 2019-07-09 NOTE — PATIENT INSTRUCTIONS
Continue revlimid and dexamethasone.  Give zometa in august.  Myeloma labs to be done in august.  OTC allergy medication.  See me as scheduled.    Patient will call to schedule.

## 2019-07-09 NOTE — PROGRESS NOTES
Medical Assistant Note:  Quiana Dunaway presents today for lab draw.    Patient seen by provider today: Yes.   present during visit today: Not Applicable.    Concerns: No Concerns.    Procedure:  Lab draw site: LAC, Needle type: BF, Gauge: 23g with gauze and coban.    Post Assessment:  Labs drawn without difficulty: Yes.    Discharge Plan:  Departure Mode: Ambulatory.    Face to Face Time: 4mins.    Nannette Castle

## 2019-07-09 NOTE — PROGRESS NOTES
Oral Chemotherapy Monitoring Program.    Patient currently on lenalidomide therapy.    Reviewed labs from 7/9/19. No concerning abnormalities. Restarted on 7/8/19. Next cycle due 7/29.  Dr. Sohan potter with labs every 4 weeks.    Questions answered to patient's satisfaction.    Will follow up in 4 weeks with rpeat labs and check in.     Debra Ortega PharmD  July 9, 2019

## 2019-07-10 NOTE — PROGRESS NOTES
Visit Date:   07/09/2019     HEMATOLOGY HISTORY: Mrs. Dunaway is a lady with multiple myeloma (IgG kappa). High risk, t(14;16).  1. On 05/01/2017, WBC of 3.4, hemoglobin of 10.2 and platelet of 154.  2. SPEP on 07/07/2017 revealed M-spike of 1.8.  3. Bone marrow biopsy on 07/12/2017 reveals kappa monotypic plasma cell population consistent with multiple myeloma.  Bone marrow is 70% cellular.  Plasma cell is 50-60%.     -There is gain of chromosome 1, 5, 9, 15, and 17.  There is translocation 14;16.   4.  On 07/18/2017:  -M-spike of 1.9.   -Immunofixation reveals monoclonal IgG kappa.    -IgG level of 2970.  IgA of 15 and IgM of 175.    -Kappa free light chain of 67.7.  Lambda free light chain of 1.42.  Ratio of kappa to lambda of 47.71.    -Beta 2 microglobulin of 3.4.   5. PET scan on 07/24/2017 reveals several focal areas of increased FDG uptake consistent with multiple myeloma.  There is probable epithelial cyst in tail of the pancreas.  No associated abnormal FDG activity.  Left thyroid cysts or nodules without any FDG activity.  There is a 0.3 cm left upper lobe lung nodule.   8.  Velcade, Revlimid and dexamethasone between on 08/14/2017 and 04/30/2018. Velcade stopped.   -Revlimid and dexamethasone continued.     SUBJECTIVE:  Ms. Dunaway is an 83-year-old female with high-risk IgG kappa multiple myeloma.  She is currently on Revlimid and dexamethasone.  She is tolerating it well.      For the last few days she has some allergies.  She has some itching in the eyes and some watering of the eyes.  She also has mild cough which is nonproductive.      No headache.  No dizziness.  No chest pain.  No shortness of breath.  No nausea or vomiting.  No bleeding.  Appetite is good.  No fever or chills.      PHYSICAL EXAMINATION:   GENERAL:  She is alert and oriented x 3.   VITAL SIGNS:  Reviewed.  ECOG PS of 1.   FACE:  No swelling.     EYES:  No icterus.    THROAT:  No ulcer.   NECK:  Supple. No lymphadenopathy.  No  tenderness.   LUNGS:  Good air entry bilaterally.  No wheezing.   HEART:  Regular.   ABDOMEN:  Soft.  Nontender.  No mass.   EXTREMITIES:  No edema. No calf redness or tenderness.   SKIN:  No rash. No petechiae.     LABORATORY DATA:  Reviewed.      ASSESSMENT:   1.  An 83-year-old female with a high-risk IgG kappa multiple myeloma.   2.  Seasonal allergies.      PLAN:   1.  I discussed regarding myeloma.  She is doing well.  She will continue on Revlimid and dexamethasone.  She is tolerating it well.  Side effects were all reviewed.  We will recheck labs from myeloma in August.     2.  She is on Zometa every 3 months.  She wants it in August as she has upcoming wedding this weekend.  Zometa will be arranged for August.  She is not having any dental or jaw related symptoms.   3.  She has seasonal allergies.  Advised her to use over-the-counter Claritin or Zyrtec.   4.  The patient will be seen in a month.  Advised her to see a physician sooner if she has bone pain, bone fracture, worsening weakness, shortness of breath, bleeding or any other concerns.         YVES PEREZ MD             D: 2019   T: 2019   MT: EMANEUL      Name:     DUGLAS CASTILLO   MRN:      [id number]        Account:      [id number]   :      1935           Visit Date:   2019      Document: [id number]

## 2019-07-19 RX ORDER — LENALIDOMIDE 10 MG/1
10 CAPSULE ORAL DAILY
Qty: 14 CAPSULE | Refills: 0 | Status: SHIPPED | OUTPATIENT
Start: 2019-07-19 | End: 2019-11-05

## 2019-07-19 RX ORDER — DEXAMETHASONE 4 MG/1
20 TABLET ORAL WEEKLY
Qty: 20 TABLET | Refills: 0 | Status: SHIPPED | OUTPATIENT
Start: 2019-07-19 | End: 2019-08-07

## 2019-07-31 ENCOUNTER — OFFICE VISIT (OUTPATIENT)
Dept: FAMILY MEDICINE | Facility: CLINIC | Age: 84
End: 2019-07-31
Payer: MEDICARE

## 2019-07-31 VITALS
OXYGEN SATURATION: 95 % | BODY MASS INDEX: 28.85 KG/M2 | WEIGHT: 156.8 LBS | HEIGHT: 62 IN | HEART RATE: 85 BPM | TEMPERATURE: 98.5 F | DIASTOLIC BLOOD PRESSURE: 68 MMHG | SYSTOLIC BLOOD PRESSURE: 114 MMHG

## 2019-07-31 DIAGNOSIS — C90.00 MULTIPLE MYELOMA NOT HAVING ACHIEVED REMISSION (H): Primary | ICD-10-CM

## 2019-07-31 DIAGNOSIS — I10 BENIGN ESSENTIAL HYPERTENSION: ICD-10-CM

## 2019-07-31 DIAGNOSIS — F41.1 GAD (GENERALIZED ANXIETY DISORDER): ICD-10-CM

## 2019-07-31 DIAGNOSIS — R19.8 ALTERNATING CONSTIPATION AND DIARRHEA: ICD-10-CM

## 2019-07-31 PROCEDURE — 99213 OFFICE O/P EST LOW 20 MIN: CPT | Performed by: INTERNAL MEDICINE

## 2019-07-31 ASSESSMENT — MIFFLIN-ST. JEOR: SCORE: 1119.49

## 2019-07-31 NOTE — PROGRESS NOTES
"Subjective     Quiana Dunaway is a 84 year old female who presents to clinic today for the following health issues:    HPI     Follow up on blood pressure    Very pleasant 84-year-old female with a history of multiple myeloma, hypertension, anxiety, overactive bladder.  She is a non-smoker.  She is accompanied by her daughter-in-law.  She requires frequent follow-up for reassurance because of her underlying anxiety.  She is following up as directed with her oncologist regarding her myeloma which seems to be stable.  She otherwise feels well and was without specific complaints today.  The diarrhea that she described last visit has become more intermittent.  There is no blood in her diarrhea or associated weight loss.  She denies abdominal pain nausea or vomiting.      Reviewed and updated as needed this visit by Provider         Review of Systems   ROS COMP: Constitutional, HEENT, cardiovascular, pulmonary, gi and gu systems are negative, except as otherwise noted.      Objective    /68 (BP Location: Right arm, Patient Position: Sitting, Cuff Size: Adult Regular)   Pulse 85   Temp 98.5  F (36.9  C) (Oral)   Ht 1.575 m (5' 2\")   Wt 71.1 kg (156 lb 12.8 oz)   SpO2 95%   BMI 28.68 kg/m    Body mass index is 28.68 kg/m .  Physical Exam   GENERAL: healthy, alert and no distress  EYES: Eyes grossly normal to inspection, PERRL and conjunctivae and sclerae normal  HENT: ear canals and TM's normal, nose and mouth without ulcers or lesions  NECK: no adenopathy, no asymmetry, masses, or scars and thyroid normal to palpation  RESP: lungs clear to auscultation - no rales, rhonchi or wheezes  CV: regular rate and rhythm, normal S1 S2, no S3 or S4, no murmur, click or rub, no peripheral edema and peripheral pulses strong  ABDOMEN: soft, nontender, no hepatosplenomegaly, no masses and bowel sounds normal  MS: no gross musculoskeletal defects noted, no edema  SKIN: no suspicious lesions or rashes  NEURO: Normal strength " and tone, mentation intact and speech normal  PSYCH: Mildly anxious affect, normal mood, normal speech, well-groomed            Assessment & Plan     1. Multiple myeloma not having achieved remission (H)  Continue follow-up with hematology/oncology as directed    2. ROBER (generalized anxiety disorder)  Under adequate control on Lexapro    3. Benign essential hypertension  Good blood pressure control today in clinic    4. Alternating constipation and diarrhea  Discussed a trial of a fiber supplement to regulate stools                Return in about 2 months (around 9/30/2019) for Blood Pressure Check.  Or sooner as needed    César Chung MD  [facility]

## 2019-08-07 ENCOUNTER — HOSPITAL ENCOUNTER (OUTPATIENT)
Facility: CLINIC | Age: 84
Setting detail: SPECIMEN
Discharge: HOME | End: 2019-08-07
Attending: INTERNAL MEDICINE | Admitting: INTERNAL MEDICINE
Payer: MEDICARE

## 2019-08-07 ENCOUNTER — INFUSION THERAPY VISIT (OUTPATIENT)
Dept: INFUSION THERAPY | Facility: CLINIC | Age: 84
End: 2019-08-07
Attending: INTERNAL MEDICINE
Payer: MEDICARE

## 2019-08-07 ENCOUNTER — TELEPHONE (OUTPATIENT)
Dept: ONCOLOGY | Facility: CLINIC | Age: 84
End: 2019-08-07

## 2019-08-07 ENCOUNTER — ONCOLOGY VISIT (OUTPATIENT)
Dept: ONCOLOGY | Facility: CLINIC | Age: 84
End: 2019-08-07
Attending: INTERNAL MEDICINE
Payer: MEDICARE

## 2019-08-07 VITALS
HEART RATE: 83 BPM | BODY MASS INDEX: 28.85 KG/M2 | HEIGHT: 62 IN | SYSTOLIC BLOOD PRESSURE: 116 MMHG | TEMPERATURE: 98.7 F | OXYGEN SATURATION: 94 % | WEIGHT: 156.8 LBS | DIASTOLIC BLOOD PRESSURE: 69 MMHG

## 2019-08-07 DIAGNOSIS — C90.00 MULTIPLE MYELOMA NOT HAVING ACHIEVED REMISSION (H): Primary | ICD-10-CM

## 2019-08-07 DIAGNOSIS — E87.6 HYPOKALEMIA: Primary | ICD-10-CM

## 2019-08-07 DIAGNOSIS — R53.83 FATIGUE, UNSPECIFIED TYPE: ICD-10-CM

## 2019-08-07 DIAGNOSIS — C90.00 MULTIPLE MYELOMA NOT HAVING ACHIEVED REMISSION (H): ICD-10-CM

## 2019-08-07 DIAGNOSIS — R05.9 COUGH: ICD-10-CM

## 2019-08-07 LAB
ALBUMIN SERPL-MCNC: 3.1 G/DL (ref 3.4–5)
ALP SERPL-CCNC: 83 U/L (ref 40–150)
ALT SERPL W P-5'-P-CCNC: 18 U/L (ref 0–50)
ANION GAP SERPL CALCULATED.3IONS-SCNC: 5 MMOL/L (ref 3–14)
AST SERPL W P-5'-P-CCNC: 6 U/L (ref 0–45)
BASOPHILS # BLD AUTO: 0 10E9/L (ref 0–0.2)
BASOPHILS NFR BLD AUTO: 0.3 %
BILIRUB SERPL-MCNC: 0.6 MG/DL (ref 0.2–1.3)
BUN SERPL-MCNC: 16 MG/DL (ref 7–30)
CALCIUM SERPL-MCNC: 8.5 MG/DL (ref 8.5–10.1)
CHLORIDE SERPL-SCNC: 108 MMOL/L (ref 94–109)
CO2 SERPL-SCNC: 29 MMOL/L (ref 20–32)
CREAT SERPL-MCNC: 0.68 MG/DL (ref 0.52–1.04)
DIFFERENTIAL METHOD BLD: ABNORMAL
EOSINOPHIL # BLD AUTO: 0.3 10E9/L (ref 0–0.7)
EOSINOPHIL NFR BLD AUTO: 3.4 %
ERYTHROCYTE [DISTWIDTH] IN BLOOD BY AUTOMATED COUNT: 13.6 % (ref 10–15)
GFR SERPL CREATININE-BSD FRML MDRD: 81 ML/MIN/{1.73_M2}
GLUCOSE SERPL-MCNC: 105 MG/DL (ref 70–99)
HCT VFR BLD AUTO: 36.9 % (ref 35–47)
HGB BLD-MCNC: 12.6 G/DL (ref 11.7–15.7)
IMM GRANULOCYTES # BLD: 0 10E9/L (ref 0–0.4)
IMM GRANULOCYTES NFR BLD: 0.4 %
LYMPHOCYTES # BLD AUTO: 1.5 10E9/L (ref 0.8–5.3)
LYMPHOCYTES NFR BLD AUTO: 19.9 %
MCH RBC QN AUTO: 30.4 PG (ref 26.5–33)
MCHC RBC AUTO-ENTMCNC: 34.1 G/DL (ref 31.5–36.5)
MCV RBC AUTO: 89 FL (ref 78–100)
MONOCYTES # BLD AUTO: 1.5 10E9/L (ref 0–1.3)
MONOCYTES NFR BLD AUTO: 19 %
NEUTROPHILS # BLD AUTO: 4.4 10E9/L (ref 1.6–8.3)
NEUTROPHILS NFR BLD AUTO: 57 %
NRBC # BLD AUTO: 0 10*3/UL
NRBC BLD AUTO-RTO: 0 /100
PLATELET # BLD AUTO: 226 10E9/L (ref 150–450)
POTASSIUM SERPL-SCNC: 3.2 MMOL/L (ref 3.4–5.3)
PROT SERPL-MCNC: 6.2 G/DL (ref 6.8–8.8)
RBC # BLD AUTO: 4.14 10E12/L (ref 3.8–5.2)
SODIUM SERPL-SCNC: 142 MMOL/L (ref 133–144)
WBC # BLD AUTO: 7.7 10E9/L (ref 4–11)

## 2019-08-07 PROCEDURE — 83883 ASSAY NEPHELOMETRY NOT SPEC: CPT | Performed by: INTERNAL MEDICINE

## 2019-08-07 PROCEDURE — 80053 COMPREHEN METABOLIC PANEL: CPT | Performed by: INTERNAL MEDICINE

## 2019-08-07 PROCEDURE — 99214 OFFICE O/P EST MOD 30 MIN: CPT | Performed by: INTERNAL MEDICINE

## 2019-08-07 PROCEDURE — G0463 HOSPITAL OUTPT CLINIC VISIT: HCPCS

## 2019-08-07 PROCEDURE — 84165 PROTEIN E-PHORESIS SERUM: CPT | Performed by: INTERNAL MEDICINE

## 2019-08-07 PROCEDURE — 82784 ASSAY IGA/IGD/IGG/IGM EACH: CPT | Performed by: INTERNAL MEDICINE

## 2019-08-07 PROCEDURE — 85025 COMPLETE CBC W/AUTO DIFF WBC: CPT | Performed by: INTERNAL MEDICINE

## 2019-08-07 PROCEDURE — 00000402 ZZHCL STATISTIC TOTAL PROTEIN: Performed by: INTERNAL MEDICINE

## 2019-08-07 PROCEDURE — 36415 COLL VENOUS BLD VENIPUNCTURE: CPT

## 2019-08-07 RX ORDER — DEXAMETHASONE 4 MG/1
20 TABLET ORAL WEEKLY
Qty: 20 TABLET | Refills: 3 | Status: SHIPPED | OUTPATIENT
Start: 2019-08-07 | End: 2019-11-05

## 2019-08-07 RX ORDER — LEVOFLOXACIN 500 MG/1
500 TABLET, FILM COATED ORAL DAILY
Qty: 7 TABLET | Refills: 0 | Status: SHIPPED | OUTPATIENT
Start: 2019-08-07 | End: 2019-08-08

## 2019-08-07 RX ORDER — POTASSIUM CHLORIDE 750 MG/1
40 TABLET, EXTENDED RELEASE ORAL ONCE
Qty: 4 TABLET | Refills: 0 | Status: SHIPPED | OUTPATIENT
Start: 2019-08-07 | End: 2019-12-02

## 2019-08-07 RX ORDER — PROCHLORPERAZINE MALEATE 10 MG
10 TABLET ORAL EVERY 6 HOURS PRN
Qty: 30 TABLET | Refills: 3 | Status: SHIPPED | OUTPATIENT
Start: 2019-08-07 | End: 2019-12-03

## 2019-08-07 ASSESSMENT — PAIN SCALES - GENERAL: PAINLEVEL: NO PAIN (0)

## 2019-08-07 ASSESSMENT — MIFFLIN-ST. JEOR: SCORE: 1119.49

## 2019-08-07 NOTE — TELEPHONE ENCOUNTER
Patient's potassium was 3.2 today. Per Dr Parry we should replace per outpatient protocol. I called the patient to relay the info to her. I sent the rx to her pharmacy of choice ([address] in [place]).    Laci BonillaD.

## 2019-08-07 NOTE — PATIENT INSTRUCTIONS
1. Hold revlimid. Continue weekly dexamethasone.  2. Levaquin once a day for 7 days.  3. CT chest in 2 weeks.  4. See me after CT scan.

## 2019-08-07 NOTE — Clinical Note
"    8/7/2019         RE: Quiana Dunaway  [address] [facility] [address]        Dear Colleague,    Thank you for referring your patient, Quiana Dunaway, to the [facility]. Please see a copy of my visit note below.    Oncology Rooming Note    August 7, 2019 2:53 PM   Quiana Dunaway is a 84 year old female who presents for:    Chief Complaint   Patient presents with     Oncology Clinic Visit     Initial Vitals: /69   Pulse 83   Temp 98.7  F (37.1  C) (Oral)   Ht 1.575 m (5' 2\")   Wt 71.1 kg (156 lb 12.8 oz)   SpO2 94%   BMI 28.68 kg/m    Estimated body mass index is 28.68 kg/m  as calculated from the following:    Height as of this encounter: 1.575 m (5' 2\").    Weight as of this encounter: 71.1 kg (156 lb 12.8 oz). Body surface area is 1.76 meters squared.  No Pain (0) Comment: Data Unavailable   No LMP recorded. Patient is postmenopausal.  Allergies reviewed: Yes  Medications reviewed: Yes    Medications: MEDICATION REFILLS NEEDED TODAY. Provider was notified.  Pharmacy name entered into [employer]: [id number] - [place], [place] - [address] AT [facility] [address]    REFILLS ON ANTI-NAUSEA  Clinical concerns:  doctor was notified.      Mariela Lyn [facility]              Again, thank you for allowing me to participate in the care of your patient.        Sincerely,        Alex Parry MD  "

## 2019-08-07 NOTE — PROGRESS NOTES
Medical Assistant Note:  Quiana Dunaway presents today for lab draw.    Patient seen by provider today: Yes: Dr Parry.   present during visit today: Not Applicable.    Concerns: No Concerns.    Procedure:  Lab draw site: LAC, Needle type: Bf, Gauge: 21. Nolan and coban applied    Post Assessment:  Labs drawn without difficulty: Yes.    Discharge Plan:  Departure Mode: Ambulatory.    Face to Face Time: 5.    Sultana Jones MA

## 2019-08-07 NOTE — PROGRESS NOTES
"Oncology Rooming Note    August 7, 2019 2:53 PM   Quiana Dunaway is a 84 year old female who presents for:    Chief Complaint   Patient presents with     Oncology Clinic Visit     Initial Vitals: /69   Pulse 83   Temp 98.7  F (37.1  C) (Oral)   Ht 1.575 m (5' 2\")   Wt 71.1 kg (156 lb 12.8 oz)   SpO2 94%   BMI 28.68 kg/m   Estimated body mass index is 28.68 kg/m  as calculated from the following:    Height as of this encounter: 1.575 m (5' 2\").    Weight as of this encounter: 71.1 kg (156 lb 12.8 oz). Body surface area is 1.76 meters squared.  No Pain (0) Comment: Data Unavailable   No LMP recorded. Patient is postmenopausal.  Allergies reviewed: Yes  Medications reviewed: Yes    Medications: MEDICATION REFILLS NEEDED TODAY. Provider was notified.  Pharmacy name entered into [employer]: [id number] - [place], [place] - [address] [facility] [address]    REFILLS ON ANTI-NAUSEA  Clinical concerns:  doctor was notified.      Mariela Lyn CMA            "

## 2019-08-08 ENCOUNTER — TELEPHONE (OUTPATIENT)
Dept: ONCOLOGY | Facility: CLINIC | Age: 84
End: 2019-08-08

## 2019-08-08 ENCOUNTER — DOCUMENTATION ONLY (OUTPATIENT)
Dept: PHARMACY | Facility: CLINIC | Age: 84
End: 2019-08-08

## 2019-08-08 DIAGNOSIS — R05.9 COUGH: ICD-10-CM

## 2019-08-08 DIAGNOSIS — C90.00 MULTIPLE MYELOMA NOT HAVING ACHIEVED REMISSION (H): Primary | ICD-10-CM

## 2019-08-08 LAB
ALBUMIN SERPL ELPH-MCNC: 3.5 G/DL (ref 3.7–5.1)
ALPHA1 GLOB SERPL ELPH-MCNC: 0.4 G/DL (ref 0.2–0.4)
ALPHA2 GLOB SERPL ELPH-MCNC: 0.8 G/DL (ref 0.5–0.9)
B-GLOBULIN SERPL ELPH-MCNC: 0.7 G/DL (ref 0.6–1)
GAMMA GLOB SERPL ELPH-MCNC: 0.6 G/DL (ref 0.7–1.6)
IGG SERPL-MCNC: 533 MG/DL (ref 695–1620)
M PROTEIN SERPL ELPH-MCNC: 0.3 G/DL
PROT PATTERN SERPL ELPH-IMP: ABNORMAL

## 2019-08-08 RX ORDER — AZITHROMYCIN 250 MG/1
TABLET, FILM COATED ORAL
Qty: 6 TABLET | Refills: 0 | Status: SHIPPED | OUTPATIENT
Start: 2019-08-08 | End: 2019-08-26

## 2019-08-08 NOTE — TELEPHONE ENCOUNTER
Quiana called this morning stating that she took 1 Levaquin last night and had the sensation of tingling in both of her legs shortly after.  No other syptoms, she still has some tingling in both feet, better than yesterday.  Confirmed she only took 1 dose, last evening, advised her to not take anymore.    Reviewed with TIN Reza who advised to stop the medication and place on allergy list.  Sent Zpak to pharmacy in place of Levaquin per TIN Reza    Will send PAUL Lambert a message to follow up tomorrow to see if her tingling has stopped.

## 2019-08-08 NOTE — PROGRESS NOTES
Oral Chemotherapy Monitoring Program.    Patient currently on lenalidomide therapy.    Reviewed labs from 8/7/19. Results are concerning for low potassium. Laci sent Rx to pharmacy for replacement.   Per Dr. Parry, hold revlimid for the time being and have CT scan done in the next couple of weeks. F/U with him after scan. Pharmacy to check in on plan at that time.    Questions answered to patient's satisfaction.    Will follow up in 2 weeks after scan.    Debra Ortega PharmD  August 8, 2019

## 2019-08-09 NOTE — TELEPHONE ENCOUNTER
I called and spoke with patient to follow up in the tingling sensation that she had after taking levaquin. Patient states that she is feeling better the tingling has subsided and her cough has also improved.     I reviewed upcoming CT appointment.    Patient to call with any questions or concerns.    Alecia Briggs

## 2019-08-10 LAB
KAPPA LC FREE SER-MCNC: 4.74 MG/DL (ref 0.33–1.94)
KAPPA LC FREE/LAMBDA FREE SER NEPH: 4.99 {RATIO} (ref 0.26–1.65)
LAMBDA LC FREE SERPL-MCNC: 0.95 MG/DL (ref 0.57–2.63)

## 2019-08-12 ENCOUNTER — NURSE TRIAGE (OUTPATIENT)
Dept: NURSING | Facility: CLINIC | Age: 84
End: 2019-08-12

## 2019-08-12 ENCOUNTER — HOSPITAL ENCOUNTER (OUTPATIENT)
Dept: CT IMAGING | Facility: CLINIC | Age: 84
Discharge: HOME | End: 2019-08-12
Attending: INTERNAL MEDICINE | Admitting: INTERNAL MEDICINE
Payer: MEDICARE

## 2019-08-12 DIAGNOSIS — R05.9 COUGH: ICD-10-CM

## 2019-08-12 DIAGNOSIS — C90.00 MULTIPLE MYELOMA NOT HAVING ACHIEVED REMISSION (H): ICD-10-CM

## 2019-08-12 PROCEDURE — 25000125 ZZHC RX 250: Performed by: INTERNAL MEDICINE

## 2019-08-12 PROCEDURE — 25000128 H RX IP 250 OP 636: Performed by: INTERNAL MEDICINE

## 2019-08-12 PROCEDURE — 71260 CT THORAX DX C+: CPT

## 2019-08-12 RX ORDER — IOPAMIDOL 755 MG/ML
80 INJECTION, SOLUTION INTRAVASCULAR ONCE
Status: COMPLETED | OUTPATIENT
Start: 2019-08-12 | End: 2019-08-12

## 2019-08-12 RX ADMIN — IOPAMIDOL 80 ML: 755 INJECTION, SOLUTION INTRAVENOUS at 09:06

## 2019-08-12 RX ADMIN — SODIUM CHLORIDE 70 ML: 9 INJECTION, SOLUTION INTRAVENOUS at 09:15

## 2019-08-12 NOTE — PROGRESS NOTES
Visit Date:   08/07/2019     HEMATOLOGY HISTORY: Mrs. Dunaway is a lady with multiple myeloma (IgG kappa). High risk, t(14;16).  1. On 05/01/2017, WBC of 3.4, hemoglobin of 10.2 and platelet of 154.  2. SPEP on 07/07/2017 revealed M-spike of 1.8.  3. Bone marrow biopsy on 07/12/2017 reveals kappa monotypic plasma cell population consistent with multiple myeloma.  Bone marrow is 70% cellular.  Plasma cell is 50-60%.     -There is gain of chromosome 1, 5, 9, 15, and 17.  There is translocation 14;16.   4.  On 07/18/2017:  -M-spike of 1.9.   -Immunofixation reveals monoclonal IgG kappa.    -IgG level of 2970.  IgA of 15 and IgM of 175.    -Kappa free light chain of 67.7.  Lambda free light chain of 1.42.  Ratio of kappa to lambda of 47.71.    -Beta 2 microglobulin of 3.4.   5. PET scan on 07/24/2017 reveals several focal areas of increased FDG uptake consistent with multiple myeloma.  There is probable epithelial cyst in tail of the pancreas.  No associated abnormal FDG activity.  Left thyroid cysts or nodules without any FDG activity.  There is a 0.3 cm left upper lobe lung nodule.   8.  Velcade, Revlimid and dexamethasone between on 08/14/2017 and 04/30/2018. Velcade stopped.   -Revlimid and dexamethasone continued.     SUBJECTIVE:  Ms. Dunaway is an 84-year-old female with high-risk multiple myeloma on Revlimid and dexamethasone.  Overall, she has been tolerating it well.      The patient is having some chest symptoms.  She has cough.  It is mainly nonproductive.  Because of cough, sometimes she has wheezing/shortness of breath.  She has been more tired.      No headache.  No dizziness.  No chest pain.  No nausea or vomiting.  Appetite has been fair.  No urinary or bowel complaints.  No bleeding.  No fever or chills.  No skin rash.      PHYSICAL EXAMINATION:   GENERAL:  She is alert, oriented x 3. ECOG PS of 2.   FACE:  No swelling.   EYES:  No icterus.   THROAT:  No ulcer or thrush.   NECK:  Supple, no  lymphadenopathy.   LUNGS:  Decreased air entry at the bases.  Occasional wheezing.   HEART:  Regular.   ABDOMEN:  Soft and nontender, no mass.   EXTREMITIES:  No edema.  No calf swelling or tenderness.   SKIN:  No rash.      LABORATORY DATA:  Reviewed.      ASSESSMENT:   1.  An 84-year-old female with IgG kappa multiple myeloma.   2.  Cough   3.  Fatigue.      PLAN:   1.  I discussed with her regarding myeloma.  Clinically, she is doing well.  Labs reveal myeloma to be stable.    Because of her chest symptoms, I told the patient to hold Revlimid.  She will continue on weekly dexamethasone.  She is agreeable for it.   2.  The patient has ongoing cough.  I am going to start her on Levaquin once a day for 1 week.   3.  Discussed regarding imaging studies.  We will get CT chest.  The patient had been on immunosuppressive drugs.  She is at risk of getting an atypical chest infection.   4.  I will see her back in the clinic after CT scan.  Advised her to see a physician sooner if she has any fever, chills, worsening weakness, shortness of breath, recurrent vomiting, bleeding or any other concerns.         YVES PEREZ MD             D: 2019   T: 2019   MT: WT      Name:     DUGLAS CASTILLO   MRN:      [id number]        Account:      [id number]   :      1935           Visit Date:   2019      Document: [id number]

## 2019-08-13 ENCOUNTER — PATIENT OUTREACH (OUTPATIENT)
Dept: ONCOLOGY | Facility: CLINIC | Age: 84
End: 2019-08-13

## 2019-08-13 DIAGNOSIS — J18.9 ACUTE PNEUMONIA: Primary | ICD-10-CM

## 2019-08-13 RX ORDER — DOXYCYCLINE HYCLATE 100 MG
100 TABLET ORAL 2 TIMES DAILY
Qty: 20 TABLET | Refills: 0 | Status: SHIPPED | OUTPATIENT
Start: 2019-08-13 | End: 2019-08-26

## 2019-08-13 NOTE — TELEPHONE ENCOUNTER
Grand -daughter Suzan calls in with concern of coughing in grandmother   apparently has these coughing episodes and when they occur pt also has difficulty breathing     Pt is NOT coughing now - speaking normally     Grand-daughter / pt also wanting CT results >    FINDINGS:  Patchy infiltrates in the medial right lower lobe. These  are new since the comparison study. No pleural or pericardial  effusion. No adenopathy in the chest. Normal caliber aorta. Normal  heart size. Stable left thyroid nodule. No acute findings in the  visualized upper abdomen. No frankly destructive bony lesions or  compression deformity seen in the thoracic spine.                                                                      IMPRESSION: Mild patchy infiltrate in the medial right lower lobe.     KATH MAE MD        Above info given to granddaughter /pt     Pt then transferred to scheduling to make an appointment to be seen within 24 hours    Protocol and care advice reviewed  Caller states understanding of the recommended disposition  Advised to call back if further questions or concerns    Virgil Sky RN / [facility]      Reason for Disposition    [1] Continuous (nonstop) coughing interferes with work or school AND [2] no improvement using cough treatment per protocol    Additional Information    Negative: Severe difficulty breathing (e.g., struggling for each breath, speaks in single words)    Negative: [1] Lips or face are bluish now AND [2] persists when not coughing    Negative: Sounds like a life-threatening emergency to the triager    Negative: Chest pain is main symptom    Negative: [1] Dry (non-productive) cough AND [2] < 3 weeks duration     (i.e., no sputum or minimal clear sputum)    Negative: [1] Wet (productive) cough AND [2] < 3 weeks duration     (i.e., white-yellow, yellow, green, or wendy colored sputum)    Negative: [1] Previous asthma attacks AND [2] this feels like asthma attack    Negative:  Chest pain  (Exception: MILD central chest pain, present only when coughing)    Negative: Difficulty breathing  (Exception: no change from usual, chronic shortness of breath)    Negative: [1] Increasing difficulty breathing AND [2] always has some difficulty breathing    Negative: Patient sounds very sick or weak to the triager    Negative: [1] Coughed up blood AND [2] > 1 tablespoon (15 ml) (Exception: blood-tinged sputum)    Negative: Fever > 103 F (39.4 C)    Negative: [1] Fever > 101 F (38.3 C) AND [2] age > 60    Negative: [1] Fever > 100.0 F (37.8 C) AND [2] bedridden (e.g., nursing home patient, CVA, chronic illness, recovering from surgery)    Negative: [1] Fever > 100.0 F (37.8 C) AND [2] diabetes mellitus or weak immune system (e.g., HIV positive, cancer chemo, splenectomy, chronic steroids)    Negative: SEVERE coughing spells (e.g., whooping sound after coughing, vomiting after coughing)    Protocols used: COUGH - CHRONIC-A-AH

## 2019-08-13 NOTE — PROGRESS NOTES
Anshu called stating that patient has been coughing and it is a deep productive cough. Anshu is requesting Dr. Parry to advise     Patient had a CT scan on 8/12. Dr. Parry reviewed and advised that patient start another antibiotic for pneumonia and symptom management with Mucinex to loosen the secretions. Comfort measure of Tylenol; and fluids are recommended as well.    I called anshu back and LVM with the above instructions and requested a return call.      Will have RN reach out to patient tomorrow to follow up.    Alecia Briggs

## 2019-08-13 NOTE — PROGRESS NOTES
CT scan reveals mild patchy infiltrate in the medial right lower lobe.  Will start her on doxycycline twice a day for 10 days.  Patient recently has been treated with Levaquin and Zithromax.

## 2019-08-15 ENCOUNTER — TELEPHONE (OUTPATIENT)
Dept: ONCOLOGY | Facility: CLINIC | Age: 84
End: 2019-08-15

## 2019-08-15 NOTE — TELEPHONE ENCOUNTER
Pt called back to let Petra know she is doing much better since the add'l antibx was added for pna. She is not coughing as much and doesn't need anything at this time.   Robert Lee

## 2019-08-16 ENCOUNTER — ANCILLARY PROCEDURE (OUTPATIENT)
Dept: CARDIOLOGY | Facility: CLINIC | Age: 84
End: 2019-08-16
Attending: INTERNAL MEDICINE
Payer: MEDICARE

## 2019-08-16 DIAGNOSIS — Z95.0 PACEMAKER: ICD-10-CM

## 2019-08-16 PROCEDURE — 93294 REM INTERROG EVL PM/LDLS PM: CPT | Performed by: INTERNAL MEDICINE

## 2019-08-24 LAB
MDC_IDC_EPISODE_DTM: NORMAL
MDC_IDC_EPISODE_DURATION: 1 S
MDC_IDC_EPISODE_ID: 22
MDC_IDC_EPISODE_TYPE: NORMAL
MDC_IDC_LEAD_IMPLANT_DT: NORMAL
MDC_IDC_LEAD_IMPLANT_DT: NORMAL
MDC_IDC_LEAD_LOCATION: NORMAL
MDC_IDC_LEAD_LOCATION: NORMAL
MDC_IDC_LEAD_LOCATION_DETAIL_1: NORMAL
MDC_IDC_LEAD_LOCATION_DETAIL_1: NORMAL
MDC_IDC_LEAD_MFG: NORMAL
MDC_IDC_LEAD_MFG: NORMAL
MDC_IDC_LEAD_MODEL: NORMAL
MDC_IDC_LEAD_MODEL: NORMAL
MDC_IDC_LEAD_POLARITY_TYPE: NORMAL
MDC_IDC_LEAD_POLARITY_TYPE: NORMAL
MDC_IDC_LEAD_SERIAL: NORMAL
MDC_IDC_LEAD_SERIAL: NORMAL
MDC_IDC_MSMT_BATTERY_DTM: NORMAL
MDC_IDC_MSMT_BATTERY_REMAINING_LONGEVITY: 94 MO
MDC_IDC_MSMT_BATTERY_RRT_TRIGGER: 2.83
MDC_IDC_MSMT_BATTERY_STATUS: NORMAL
MDC_IDC_MSMT_BATTERY_VOLTAGE: 3.02 V
MDC_IDC_MSMT_LEADCHNL_RA_IMPEDANCE_VALUE: 361 OHM
MDC_IDC_MSMT_LEADCHNL_RA_IMPEDANCE_VALUE: 399 OHM
MDC_IDC_MSMT_LEADCHNL_RA_PACING_THRESHOLD_AMPLITUDE: 0.5 V
MDC_IDC_MSMT_LEADCHNL_RA_PACING_THRESHOLD_PULSEWIDTH: 0.4 MS
MDC_IDC_MSMT_LEADCHNL_RA_SENSING_INTR_AMPL: 3 MV
MDC_IDC_MSMT_LEADCHNL_RA_SENSING_INTR_AMPL: 3 MV
MDC_IDC_MSMT_LEADCHNL_RV_IMPEDANCE_VALUE: 399 OHM
MDC_IDC_MSMT_LEADCHNL_RV_IMPEDANCE_VALUE: 456 OHM
MDC_IDC_MSMT_LEADCHNL_RV_PACING_THRESHOLD_AMPLITUDE: 1.38 V
MDC_IDC_MSMT_LEADCHNL_RV_PACING_THRESHOLD_PULSEWIDTH: 0.4 MS
MDC_IDC_MSMT_LEADCHNL_RV_SENSING_INTR_AMPL: 4.5 MV
MDC_IDC_MSMT_LEADCHNL_RV_SENSING_INTR_AMPL: 4.5 MV
MDC_IDC_PG_IMPLANT_DTM: NORMAL
MDC_IDC_PG_MFG: NORMAL
MDC_IDC_PG_MODEL: NORMAL
MDC_IDC_PG_SERIAL: NORMAL
MDC_IDC_PG_TYPE: NORMAL
MDC_IDC_SESS_CLINIC_NAME: NORMAL
MDC_IDC_SESS_DTM: NORMAL
MDC_IDC_SESS_TYPE: NORMAL
MDC_IDC_SET_BRADY_AT_MODE_SWITCH_RATE: 171 {BEATS}/MIN
MDC_IDC_SET_BRADY_HYSTRATE: NORMAL
MDC_IDC_SET_BRADY_LOWRATE: 60 {BEATS}/MIN
MDC_IDC_SET_BRADY_MAX_SENSOR_RATE: 120 {BEATS}/MIN
MDC_IDC_SET_BRADY_MAX_TRACKING_RATE: 120 {BEATS}/MIN
MDC_IDC_SET_BRADY_MODE: NORMAL
MDC_IDC_SET_BRADY_PAV_DELAY_LOW: 300 MS
MDC_IDC_SET_BRADY_SAV_DELAY_LOW: 300 MS
MDC_IDC_SET_LEADCHNL_RA_PACING_AMPLITUDE: 1.5 V
MDC_IDC_SET_LEADCHNL_RA_PACING_ANODE_ELECTRODE_1: NORMAL
MDC_IDC_SET_LEADCHNL_RA_PACING_ANODE_LOCATION_1: NORMAL
MDC_IDC_SET_LEADCHNL_RA_PACING_CAPTURE_MODE: NORMAL
MDC_IDC_SET_LEADCHNL_RA_PACING_CATHODE_ELECTRODE_1: NORMAL
MDC_IDC_SET_LEADCHNL_RA_PACING_CATHODE_LOCATION_1: NORMAL
MDC_IDC_SET_LEADCHNL_RA_PACING_POLARITY: NORMAL
MDC_IDC_SET_LEADCHNL_RA_PACING_PULSEWIDTH: 0.4 MS
MDC_IDC_SET_LEADCHNL_RA_SENSING_ANODE_ELECTRODE_1: NORMAL
MDC_IDC_SET_LEADCHNL_RA_SENSING_ANODE_LOCATION_1: NORMAL
MDC_IDC_SET_LEADCHNL_RA_SENSING_CATHODE_ELECTRODE_1: NORMAL
MDC_IDC_SET_LEADCHNL_RA_SENSING_CATHODE_LOCATION_1: NORMAL
MDC_IDC_SET_LEADCHNL_RA_SENSING_POLARITY: NORMAL
MDC_IDC_SET_LEADCHNL_RA_SENSING_SENSITIVITY: 0.3 MV
MDC_IDC_SET_LEADCHNL_RV_PACING_AMPLITUDE: 2.75 V
MDC_IDC_SET_LEADCHNL_RV_PACING_ANODE_ELECTRODE_1: NORMAL
MDC_IDC_SET_LEADCHNL_RV_PACING_ANODE_LOCATION_1: NORMAL
MDC_IDC_SET_LEADCHNL_RV_PACING_CAPTURE_MODE: NORMAL
MDC_IDC_SET_LEADCHNL_RV_PACING_CATHODE_ELECTRODE_1: NORMAL
MDC_IDC_SET_LEADCHNL_RV_PACING_CATHODE_LOCATION_1: NORMAL
MDC_IDC_SET_LEADCHNL_RV_PACING_POLARITY: NORMAL
MDC_IDC_SET_LEADCHNL_RV_PACING_PULSEWIDTH: 0.4 MS
MDC_IDC_SET_LEADCHNL_RV_SENSING_ANODE_ELECTRODE_1: NORMAL
MDC_IDC_SET_LEADCHNL_RV_SENSING_ANODE_LOCATION_1: NORMAL
MDC_IDC_SET_LEADCHNL_RV_SENSING_CATHODE_ELECTRODE_1: NORMAL
MDC_IDC_SET_LEADCHNL_RV_SENSING_CATHODE_LOCATION_1: NORMAL
MDC_IDC_SET_LEADCHNL_RV_SENSING_POLARITY: NORMAL
MDC_IDC_SET_LEADCHNL_RV_SENSING_SENSITIVITY: 0.9 MV
MDC_IDC_SET_ZONE_DETECTION_INTERVAL: 350 MS
MDC_IDC_SET_ZONE_DETECTION_INTERVAL: 400 MS
MDC_IDC_SET_ZONE_TYPE: NORMAL
MDC_IDC_STAT_AT_BURDEN_PERCENT: 0 %
MDC_IDC_STAT_AT_DTM_END: NORMAL
MDC_IDC_STAT_AT_DTM_START: NORMAL
MDC_IDC_STAT_BRADY_AP_VP_PERCENT: 0.06 %
MDC_IDC_STAT_BRADY_AP_VS_PERCENT: 58.71 %
MDC_IDC_STAT_BRADY_AS_VP_PERCENT: 0.01 %
MDC_IDC_STAT_BRADY_AS_VS_PERCENT: 41.22 %
MDC_IDC_STAT_BRADY_DTM_END: NORMAL
MDC_IDC_STAT_BRADY_DTM_START: NORMAL
MDC_IDC_STAT_BRADY_RA_PERCENT_PACED: 58.65 %
MDC_IDC_STAT_BRADY_RV_PERCENT_PACED: 0.07 %
MDC_IDC_STAT_EPISODE_RECENT_COUNT: 0
MDC_IDC_STAT_EPISODE_RECENT_COUNT: 1
MDC_IDC_STAT_EPISODE_RECENT_COUNT_DTM_END: NORMAL
MDC_IDC_STAT_EPISODE_RECENT_COUNT_DTM_START: NORMAL
MDC_IDC_STAT_EPISODE_TOTAL_COUNT: 0
MDC_IDC_STAT_EPISODE_TOTAL_COUNT: 0
MDC_IDC_STAT_EPISODE_TOTAL_COUNT: 15
MDC_IDC_STAT_EPISODE_TOTAL_COUNT: 7
MDC_IDC_STAT_EPISODE_TOTAL_COUNT_DTM_END: NORMAL
MDC_IDC_STAT_EPISODE_TOTAL_COUNT_DTM_START: NORMAL
MDC_IDC_STAT_EPISODE_TYPE: NORMAL

## 2019-08-26 ENCOUNTER — HOSPITAL ENCOUNTER (OUTPATIENT)
Facility: CLINIC | Age: 84
Setting detail: SPECIMEN
End: 2019-08-26
Attending: INTERNAL MEDICINE
Payer: MEDICARE

## 2019-08-26 ENCOUNTER — ONCOLOGY VISIT (OUTPATIENT)
Dept: ONCOLOGY | Facility: CLINIC | Age: 84
End: 2019-08-26
Attending: INTERNAL MEDICINE
Payer: MEDICARE

## 2019-08-26 ENCOUNTER — DOCUMENTATION ONLY (OUTPATIENT)
Dept: PHARMACY | Facility: CLINIC | Age: 84
End: 2019-08-26

## 2019-08-26 VITALS
OXYGEN SATURATION: 96 % | BODY MASS INDEX: 28.52 KG/M2 | WEIGHT: 155 LBS | HEART RATE: 89 BPM | DIASTOLIC BLOOD PRESSURE: 88 MMHG | SYSTOLIC BLOOD PRESSURE: 152 MMHG | HEIGHT: 62 IN | RESPIRATION RATE: 16 BRPM

## 2019-08-26 DIAGNOSIS — C90.00 MULTIPLE MYELOMA NOT HAVING ACHIEVED REMISSION (H): Primary | ICD-10-CM

## 2019-08-26 PROCEDURE — G0463 HOSPITAL OUTPT CLINIC VISIT: HCPCS

## 2019-08-26 PROCEDURE — 99213 OFFICE O/P EST LOW 20 MIN: CPT | Performed by: INTERNAL MEDICINE

## 2019-08-26 RX ORDER — DEXAMETHASONE 4 MG/1
20 TABLET ORAL WEEKLY
Qty: 20 TABLET | Refills: 0 | Status: SHIPPED | OUTPATIENT
Start: 2019-08-26 | End: 2019-11-05

## 2019-08-26 RX ORDER — LENALIDOMIDE 10 MG/1
10 CAPSULE ORAL DAILY
Qty: 14 CAPSULE | Refills: 0 | Status: SHIPPED | OUTPATIENT
Start: 2019-08-26 | End: 2019-11-05

## 2019-08-26 ASSESSMENT — MIFFLIN-ST. JEOR: SCORE: 1111.33

## 2019-08-26 ASSESSMENT — PAIN SCALES - GENERAL: PAINLEVEL: NO PAIN (0)

## 2019-08-26 NOTE — PROGRESS NOTES
Oral Chemotherapy Monitoring Program.    Patient currently on lenalidomide therapy.    Patient has been holding for a few weeks now. Per Dr. Parry, resume lenalidomide next week sometime. Repeat labs at the end of September.    Questions answered to patient's satisfaction.    Will follow up in 4 weeks with labs and check in.     Debra BonillaD  August 26, 2019

## 2019-08-26 NOTE — PROGRESS NOTES
Visit Date:   08/26/2019     HEMATOLOGY HISTORY: Mrs. Dunaway is a lady with multiple myeloma (IgG kappa). High risk, t(14;16).  1. On 05/01/2017, WBC of 3.4, hemoglobin of 10.2 and platelet of 154.  2. SPEP on 07/07/2017 revealed M-spike of 1.8.  3. Bone marrow biopsy on 07/12/2017 reveals kappa monotypic plasma cell population consistent with multiple myeloma.  Bone marrow is 70% cellular.  Plasma cell is 50-60%.     -There is gain of chromosome 1, 5, 9, 15, and 17.  There is translocation 14;16.   4.  On 07/18/2017:  -M-spike of 1.9.   -Immunofixation reveals monoclonal IgG kappa.    -IgG level of 2970.  IgA of 15 and IgM of 175.    -Kappa free light chain of 67.7.  Lambda free light chain of 1.42.  Ratio of kappa to lambda of 47.71.    -Beta 2 microglobulin of 3.4.   5. PET scan on 07/24/2017 reveals several focal areas of increased FDG uptake consistent with multiple myeloma.  There is probable epithelial cyst in tail of the pancreas.  No associated abnormal FDG activity.  Left thyroid cysts or nodules without any FDG activity.  There is a 0.3 cm left upper lobe lung nodule.   8.  Velcade, Revlimid and dexamethasone between on 08/14/2017 and 04/30/2018. Velcade stopped.   -Revlimid and dexamethasone continued.     SUBJECTIVE:  Ms. Dunaway is an 84-year-old female with high-risk multiple myeloma on Revlimid and dexamethasone.  Treatment is on hold as patient was having chest-related symptoms.      CT chest was done on 08/12/2019.  It revealed right lower lobe pneumonia.  She was started on doxycycline.  She is feeling much better.      No headache or dizziness.  No chest pain.  No shortness of breath.  No cough.  No abdominal pain, nausea or vomiting.  Appetite is good.  No urinary or bowel complaints.  No bleeding.  No fever or chills.  Her fatigue is less.      PHYSICAL EXAMINATION:   GENERAL:  She was alert, oriented x 3.   VITAL SIGNS:  Reviewed.  ECOG PS of 1.   The rest of the systems not examined.       LABORATORY DATA:  Reviewed.      ASSESSMENT:   1.  An 84-year-old female with multiple myeloma.   2.  Pneumonia, which has resolved.      PLAN:   1.  The patient clinically is doing well.  Since her pneumonia has resolved, she feels much better.   2.  Discussed regarding resuming treatment for myeloma.  She will start Revlimid along with weekly dexamethasone next week.   3.  She is on Zometa every 3 months, which will be continued.  Next dose will be in September.   4.  I will see her end of September with labs.  The patient advised to see a physician sooner if she has any fever, chills, worsening weakness, shortness of breath, recurrent vomiting, bleeding, or any other concerns.         YVES PEREZ MD             D: 2019   T: 2019   MT: HERMAN      Name:     DUGLAS CASTILLO   MRN:      [id number]        Account:      [id number]   :      1935           Visit Date:   2019      Document: [id number]

## 2019-08-26 NOTE — PATIENT INSTRUCTIONS
1. Start revlimid and dexamethasone next week.  2. Zometa and labs in last week of September.  3. Follow-up in last week of September.

## 2019-09-11 DIAGNOSIS — I10 BENIGN ESSENTIAL HYPERTENSION: ICD-10-CM

## 2019-09-11 RX ORDER — LISINOPRIL 10 MG/1
TABLET ORAL
Qty: 90 TABLET | Refills: 1 | Status: SHIPPED | OUTPATIENT
Start: 2019-09-11 | End: 2020-03-06

## 2019-09-11 NOTE — TELEPHONE ENCOUNTER
"lisinopril (PRINIVIL/ZESTRIL) 10 MG tablet 90 tablet 0 6/11/2019     Last Written Prescription Date:  6/11/19  Last Fill Quantity: 90,  # refills: 0   Last office visit: 7/31/2019 with prescribing provider:  Juliet   Future Office Visit:   Next 5 appointments (look out 90 days)    Oct 03, 2019  2:20 PM CDT  Return Visit with Alex Parry MD  [facility] ([facility]) [facility]  [address]  [facility] [address] [phone]   Oct 07, 2019  3:00 PM CDT  Office Visit with César Chung MD  [facility] [address] [facility] [id number] [phone]         Requested Prescriptions   Pending Prescriptions Disp Refills     lisinopril (PRINIVIL/ZESTRIL) 10 MG tablet [Pharmacy Med Name: LISINOPRIL 10MG TABLETS] 90 tablet 0     Sig: TAKE 1 TABLET BY MOUTH DAILY       ACE Inhibitors (Including Combos) Protocol Failed - 9/11/2019  7:57 AM        Failed - Blood pressure under 140/90 in past 12 months     BP Readings from Last 3 Encounters:   08/26/19 (!) 152/88   08/07/19 116/69   07/31/19 114/68                 Failed - Normal serum potassium on file in past 12 months     Recent Labs   Lab Test 08/07/19  1450   POTASSIUM 3.2*             Passed - Recent (12 mo) or future (30 days) visit within the authorizing provider's specialty     Patient had office visit in the last 12 months or has a visit in the next 30 days with authorizing provider or within the authorizing provider's specialty.  See \"Patient Info\" tab in inbasket, or \"Choose Columns\" in Meds & Orders section of the refill encounter.              Passed - Medication is active on med list        Passed - Patient is age 18 or older        Passed - No active pregnancy on record        Passed - Normal serum creatinine on file in past 12 months     Recent Labs   Lab Test 08/07/19  1450  07/24/17  0827   CR 0.68   < >  --    CREAT  --   --  0.7    < > = values in " this interval not displayed.             Passed - No positive pregnancy test within past 12 months        No flowsheet data found.

## 2019-09-25 DIAGNOSIS — C90.00 MULTIPLE MYELOMA NOT HAVING ACHIEVED REMISSION (H): Primary | ICD-10-CM

## 2019-09-25 RX ORDER — DEXAMETHASONE 4 MG/1
20 TABLET ORAL WEEKLY
Qty: 20 TABLET | Refills: 0 | Status: SHIPPED | OUTPATIENT
Start: 2019-09-25 | End: 2019-11-05

## 2019-09-25 RX ORDER — LENALIDOMIDE 10 MG/1
10 CAPSULE ORAL DAILY
Qty: 14 CAPSULE | Refills: 0 | Status: SHIPPED | OUTPATIENT
Start: 2019-09-25 | End: 2019-11-05

## 2019-09-26 DIAGNOSIS — C90.00 MULTIPLE MYELOMA NOT HAVING ACHIEVED REMISSION (H): ICD-10-CM

## 2019-09-26 RX ORDER — ACYCLOVIR 400 MG/1
400 TABLET ORAL 2 TIMES DAILY
Qty: 60 TABLET | Refills: 5 | Status: SHIPPED | OUTPATIENT
Start: 2019-09-26 | End: 2019-12-03

## 2019-09-30 RX ORDER — ZOLEDRONIC ACID 0.04 MG/ML
4 INJECTION, SOLUTION INTRAVENOUS ONCE
Status: CANCELLED | OUTPATIENT
Start: 2020-01-08

## 2019-10-03 ENCOUNTER — DOCUMENTATION ONLY (OUTPATIENT)
Dept: PHARMACY | Facility: CLINIC | Age: 84
End: 2019-10-03

## 2019-10-03 ENCOUNTER — ONCOLOGY VISIT (OUTPATIENT)
Dept: ONCOLOGY | Facility: CLINIC | Age: 84
End: 2019-10-03
Attending: INTERNAL MEDICINE
Payer: MEDICARE

## 2019-10-03 ENCOUNTER — INFUSION THERAPY VISIT (OUTPATIENT)
Dept: INFUSION THERAPY | Facility: CLINIC | Age: 84
End: 2019-10-03
Attending: INTERNAL MEDICINE
Payer: MEDICARE

## 2019-10-03 ENCOUNTER — HOSPITAL ENCOUNTER (OUTPATIENT)
Facility: CLINIC | Age: 84
Setting detail: SPECIMEN
Discharge: HOME | End: 2019-10-03
Attending: INTERNAL MEDICINE | Admitting: INTERNAL MEDICINE
Payer: MEDICARE

## 2019-10-03 VITALS
TEMPERATURE: 98.2 F | DIASTOLIC BLOOD PRESSURE: 60 MMHG | SYSTOLIC BLOOD PRESSURE: 120 MMHG | HEART RATE: 63 BPM | OXYGEN SATURATION: 96 % | RESPIRATION RATE: 16 BRPM

## 2019-10-03 VITALS
OXYGEN SATURATION: 96 % | BODY MASS INDEX: 28.35 KG/M2 | SYSTOLIC BLOOD PRESSURE: 120 MMHG | RESPIRATION RATE: 16 BRPM | HEART RATE: 63 BPM | DIASTOLIC BLOOD PRESSURE: 60 MMHG | HEIGHT: 62 IN | TEMPERATURE: 98.2 F

## 2019-10-03 DIAGNOSIS — C90.00 MULTIPLE MYELOMA NOT HAVING ACHIEVED REMISSION (H): Primary | ICD-10-CM

## 2019-10-03 LAB
ALBUMIN SERPL-MCNC: 3.3 G/DL (ref 3.4–5)
ALP SERPL-CCNC: 71 U/L (ref 40–150)
ALT SERPL W P-5'-P-CCNC: 16 U/L (ref 0–50)
ANION GAP SERPL CALCULATED.3IONS-SCNC: 5 MMOL/L (ref 3–14)
AST SERPL W P-5'-P-CCNC: 13 U/L (ref 0–45)
BASOPHILS # BLD AUTO: 0 10E9/L (ref 0–0.2)
BASOPHILS NFR BLD AUTO: 0.5 %
BILIRUB SERPL-MCNC: 0.5 MG/DL (ref 0.2–1.3)
BUN SERPL-MCNC: 14 MG/DL (ref 7–30)
CALCIUM SERPL-MCNC: 8.9 MG/DL (ref 8.5–10.1)
CHLORIDE SERPL-SCNC: 107 MMOL/L (ref 94–109)
CO2 SERPL-SCNC: 29 MMOL/L (ref 20–32)
CREAT SERPL-MCNC: 0.62 MG/DL (ref 0.52–1.04)
DIFFERENTIAL METHOD BLD: NORMAL
EOSINOPHIL # BLD AUTO: 0.1 10E9/L (ref 0–0.7)
EOSINOPHIL NFR BLD AUTO: 1.4 %
ERYTHROCYTE [DISTWIDTH] IN BLOOD BY AUTOMATED COUNT: 13.5 % (ref 10–15)
GFR SERPL CREATININE-BSD FRML MDRD: 83 ML/MIN/{1.73_M2}
GLUCOSE SERPL-MCNC: 80 MG/DL (ref 70–99)
HCT VFR BLD AUTO: 37.7 % (ref 35–47)
HGB BLD-MCNC: 12.8 G/DL (ref 11.7–15.7)
IMM GRANULOCYTES # BLD: 0 10E9/L (ref 0–0.4)
IMM GRANULOCYTES NFR BLD: 1 %
LYMPHOCYTES # BLD AUTO: 1.5 10E9/L (ref 0.8–5.3)
LYMPHOCYTES NFR BLD AUTO: 37.2 %
MCH RBC QN AUTO: 30.8 PG (ref 26.5–33)
MCHC RBC AUTO-ENTMCNC: 34 G/DL (ref 31.5–36.5)
MCV RBC AUTO: 91 FL (ref 78–100)
MONOCYTES # BLD AUTO: 0.6 10E9/L (ref 0–1.3)
MONOCYTES NFR BLD AUTO: 15 %
NEUTROPHILS # BLD AUTO: 1.9 10E9/L (ref 1.6–8.3)
NEUTROPHILS NFR BLD AUTO: 44.9 %
NRBC # BLD AUTO: 0 10*3/UL
NRBC BLD AUTO-RTO: 0 /100
PLATELET # BLD AUTO: 227 10E9/L (ref 150–450)
POTASSIUM SERPL-SCNC: 3.7 MMOL/L (ref 3.4–5.3)
PROT SERPL-MCNC: 6.5 G/DL (ref 6.8–8.8)
RBC # BLD AUTO: 4.16 10E12/L (ref 3.8–5.2)
SODIUM SERPL-SCNC: 141 MMOL/L (ref 133–144)
WBC # BLD AUTO: 4.1 10E9/L (ref 4–11)

## 2019-10-03 PROCEDURE — 83883 ASSAY NEPHELOMETRY NOT SPEC: CPT | Performed by: INTERNAL MEDICINE

## 2019-10-03 PROCEDURE — G0008 ADMIN INFLUENZA VIRUS VAC: HCPCS

## 2019-10-03 PROCEDURE — 82784 ASSAY IGA/IGD/IGG/IGM EACH: CPT | Performed by: INTERNAL MEDICINE

## 2019-10-03 PROCEDURE — 00000402 ZZHCL STATISTIC TOTAL PROTEIN: Performed by: INTERNAL MEDICINE

## 2019-10-03 PROCEDURE — 85025 COMPLETE CBC W/AUTO DIFF WBC: CPT | Performed by: INTERNAL MEDICINE

## 2019-10-03 PROCEDURE — 80053 COMPREHEN METABOLIC PANEL: CPT | Performed by: INTERNAL MEDICINE

## 2019-10-03 PROCEDURE — 90662 IIV NO PRSV INCREASED AG IM: CPT | Performed by: INTERNAL MEDICINE

## 2019-10-03 PROCEDURE — 96374 THER/PROPH/DIAG INJ IV PUSH: CPT

## 2019-10-03 PROCEDURE — 25000128 H RX IP 250 OP 636: Performed by: INTERNAL MEDICINE

## 2019-10-03 PROCEDURE — 84165 PROTEIN E-PHORESIS SERUM: CPT | Performed by: INTERNAL MEDICINE

## 2019-10-03 PROCEDURE — 99214 OFFICE O/P EST MOD 30 MIN: CPT | Performed by: INTERNAL MEDICINE

## 2019-10-03 RX ORDER — ZOLEDRONIC ACID 0.04 MG/ML
4 INJECTION, SOLUTION INTRAVENOUS ONCE
Status: COMPLETED | OUTPATIENT
Start: 2019-10-03 | End: 2019-10-03

## 2019-10-03 RX ADMIN — INFLUENZA A VIRUS A/MICHIGAN/45/2015 X-275 (H1N1) ANTIGEN (FORMALDEHYDE INACTIVATED), INFLUENZA A VIRUS A/SINGAPORE/INFIMH-16-0019/2016 IVR-186 (H3N2) ANTIGEN (FORMALDEHYDE INACTIVATED), AND INFLUENZA B VIRUS B/MARYLAND/15/2016 BX-69A (A B/COLORADO/6/2017-LIKE VIRUS) ANTIGEN (FORMALDEHYDE INACTIVATED) 0.5 ML: 60; 60; 60 INJECTION, SUSPENSION INTRAMUSCULAR at 15:08

## 2019-10-03 RX ADMIN — ZOLEDRONIC ACID 4 MG: 0.04 INJECTION, SOLUTION INTRAVENOUS at 15:05

## 2019-10-03 ASSESSMENT — PAIN SCALES - GENERAL: PAINLEVEL: NO PAIN (0)

## 2019-10-03 NOTE — PATIENT INSTRUCTIONS
1. Continue revlimid and dexamethasone.  2. Zometa today and every 3 months.  3. Follow-up in 4-5 weeks with labs. Labs as per treatment plan.    PATIENT BACK TO INFUSION

## 2019-10-03 NOTE — PROGRESS NOTES
"Oncology Rooming Note    October 3, 2019 2:01 PM   Quiana Dunaway is a 84 year old female who presents for:    Chief Complaint   Patient presents with     Oncology Clinic Visit     Initial Vitals: /60   Pulse 63   Temp 98.2  F (36.8  C) (Oral)   Resp 16   Ht 1.575 m (5' 2\")   SpO2 96%   BMI 28.35 kg/m   Estimated body mass index is 28.35 kg/m  as calculated from the following:    Height as of this encounter: 1.575 m (5' 2\").    Weight as of 8/26/19: 70.3 kg (155 lb). Body surface area is 1.75 meters squared.  No Pain (0) Comment: Data Unavailable   No LMP recorded. Patient is postmenopausal.  Allergies reviewed: Yes  Medications reviewed: Yes    Medications: Medication refills not needed today.  Pharmacy name entered into [employer]: [id number] - [place], [place] - [address] [place] [facility] [address]    Clinical concerns: no      Lauren Arredondo CMA            "

## 2019-10-03 NOTE — PROGRESS NOTES
Oral Chemotherapy Monitoring Program.    Patient currently on lenalidomide therapy.    Reviewed labs from 10/3/19. No concerning abnormalities.  Patient gets labs every 4 weeks per Dr. Parry.   Confirmed with patient that she started most recent cycle on 9/30. Will be due for new supply on 10/21.    Questions answered to patient's satisfaction.    Will follow up in 4 weeks with repeat labs.     Debra Ortega PharmD  October 3, 2019

## 2019-10-03 NOTE — Clinical Note
"    10/3/2019         RE: Quiana Dunaway  [address] [facility] [address]        Dear Colleague,    Thank you for referring your patient, Quiana Dunaway, to the [facility]. Please see a copy of my visit note below.    Oncology Rooming Note    October 3, 2019 2:01 PM   Quiana Dunaway is a 84 year old female who presents for:    Chief Complaint   Patient presents with     Oncology Clinic Visit     Initial Vitals: /60   Pulse 63   Temp 98.2  F (36.8  C) (Oral)   Resp 16   Ht 1.575 m (5' 2\")   SpO2 96%   BMI 28.35 kg/m    Estimated body mass index is 28.35 kg/m  as calculated from the following:    Height as of this encounter: 1.575 m (5' 2\").    Weight as of 8/26/19: 70.3 kg (155 lb). Body surface area is 1.75 meters squared.  No Pain (0) Comment: Data Unavailable   No LMP recorded. Patient is postmenopausal.  Allergies reviewed: Yes  Medications reviewed: Yes    Medications: Medication refills not needed today.  Pharmacy name entered into [employer]: [id number] - [place], [place] - [address] AT [facility] [address]    Clinical concerns: no      Lauren Arredondo [facility]              Visit Date:   10/03/2019     HEMATOLOGY HISTORY: Mrs. Dunaway is a lady with multiple myeloma (IgG kappa). High risk, t(14;16).  1. On 05/01/2017, WBC of 3.4, hemoglobin of 10.2 and platelet of 154.  2. SPEP on 07/07/2017 revealed M-spike of 1.8.  3. Bone marrow biopsy on 07/12/2017 reveals kappa monotypic plasma cell population consistent with multiple myeloma.  Bone marrow is 70% cellular.  Plasma cell is 50-60%.     -There is gain of chromosome 1, 5, 9, 15, and 17.  There is translocation 14;16.   4.  On 07/18/2017:  -M-spike of 1.9.   -Immunofixation reveals monoclonal IgG kappa.    -IgG level of 2970.  IgA of 15 and IgM of 175.    -Kappa free light chain of 67.7.  Lambda free light chain of 1.42.  Ratio of kappa to lambda of 47.71.    -Beta 2 microglobulin of 3.4.   5. PET scan on " 07/24/2017 reveals several focal areas of increased FDG uptake consistent with multiple myeloma.  There is probable epithelial cyst in tail of the pancreas.  No associated abnormal FDG activity.  Left thyroid cysts or nodules without any FDG activity.  There is a 0.3 cm left upper lobe lung nodule.   8.  Velcade, Revlimid and dexamethasone between on 08/14/2017 and 04/30/2018. Velcade stopped.   -Revlimid and dexamethasone continued.     SUBJECTIVE:  Ms. Dunaway is an 84-year-old female with high-risk multiple myeloma.  She is currently on Revlimid and dexamethasone.  Overall, she is tolerating it well.      Overall, she is doing good.  No headache.  No dizziness.  No chest pain or difficulty breathing.  No abdominal pain, nausea or vomiting.  Appetite is good.  No urinary or bowel complaints.  No bleeding.  No fever, chills or night sweats.  She has some fatigue.      PHYSICAL EXAMINATION:   GENERAL:  She is alert, oriented x 3. ECOG PS of 2.   FACE:  No swelling.   EYES:  No icterus.   THROAT:  No ulcer or thrush.   NECK:  Supple, no lymphadenopathy.   LUNGS:  Decreased air entry at the bases.  Occasional wheezing.   HEART:  Regular.   ABDOMEN:  Soft and nontender, no mass.   EXTREMITIES:  No edema.  No calf swelling or tenderness.   SKIN:  No rash.      LABORATORY DATA:  Reviewed.      ASSESSMENT:   1.  An 84-year-old female with high-risk multiple myeloma.   2.  Fatigue from malignancy.      PLAN:   1.  The patient clinically is stable from myeloma.  She is on Revlimid and dexamethasone, which she will continue.  She is tolerating it well.  Side effects were all discussed.  She will continue on aspirin to reduce risk of deep venous thrombosis.   2.  The patient will continue on Zometa every 3 months.  She is tolerating it well.  She does not have any jaw or dental related symptoms.   3.  She will be seen back in 4-5 weeks with labs.  Advised her to call us with any questions or concerns.         YVES PEREZ  MD             D: 10/03/2019   T: 10/03/2019   MT:       Name:     DUGLAS CASTILLO   MRN:      [id number]        Account:      [id number]   :      1935           Visit Date:   10/03/2019      Document: [id number]        Again, thank you for allowing me to participate in the care of your patient.        Sincerely,        Alex Parry MD

## 2019-10-03 NOTE — PROGRESS NOTES
Infusion Nursing Note:  Quiana Dunaway presents today for Labs/Zometa.    Patient seen by provider today: Yes: Sohan   present during visit today: Not Applicable.    Note: N/A.    Intravenous Access:  Labs drawn without difficulty.  Peripheral IV placed.    Treatment Conditions:  Lab Results   Component Value Date    HGB 12.8 10/03/2019     Lab Results   Component Value Date    WBC 4.1 10/03/2019      Lab Results   Component Value Date    ANEU 1.9 10/03/2019     Lab Results   Component Value Date     10/03/2019      Lab Results   Component Value Date     10/03/2019                   Lab Results   Component Value Date    POTASSIUM 3.7 10/03/2019           No results found for: MAG         Lab Results   Component Value Date    CR 0.62 10/03/2019                   Lab Results   Component Value Date    HUMBERTO 8.9 10/03/2019                Lab Results   Component Value Date    BILITOTAL 0.5 10/03/2019           Lab Results   Component Value Date    ALBUMIN 3.3 10/03/2019                    Lab Results   Component Value Date    ALT 16 10/03/2019           Lab Results   Component Value Date    AST 13 10/03/2019       Results reviewed, labs MET treatment parameters, ok to proceed with treatment.      Post Infusion Assessment:  Patient tolerated infusion without incident.  Patient tolerated injection without incident.  Blood return noted pre and post infusion.  Site patent and intact, free from redness, edema or discomfort.  No evidence of extravasations.  Access discontinued per protocol.       Discharge Plan:   Discharge instructions reviewed with: Patient and Family.  Patient and/or family verbalized understanding of discharge instructions and all questions answered.  AVS to patient via [employer].  Patient will return 11/5/19 for next appointment.   Patient discharged in stable condition accompanied by: daughter.  Departure Mode: Ambulatory.    Wes New, FAUSTINO, RN

## 2019-10-04 LAB
ALBUMIN SERPL ELPH-MCNC: 3.7 G/DL (ref 3.7–5.1)
ALPHA1 GLOB SERPL ELPH-MCNC: 0.3 G/DL (ref 0.2–0.4)
ALPHA2 GLOB SERPL ELPH-MCNC: 0.8 G/DL (ref 0.5–0.9)
B-GLOBULIN SERPL ELPH-MCNC: 0.6 G/DL (ref 0.6–1)
GAMMA GLOB SERPL ELPH-MCNC: 0.6 G/DL (ref 0.7–1.6)
IGG SERPL-MCNC: 627 MG/DL (ref 695–1620)
KAPPA LC UR-MCNC: 6.19 MG/DL (ref 0.33–1.94)
KAPPA LC/LAMBDA SER: 6.96 {RATIO} (ref 0.26–1.65)
LAMBDA LC SERPL-MCNC: 0.89 MG/DL (ref 0.57–2.63)
M PROTEIN SERPL ELPH-MCNC: 0.4 G/DL
PROT PATTERN SERPL ELPH-IMP: ABNORMAL

## 2019-10-04 NOTE — PROGRESS NOTES
Visit Date:   10/03/2019     HEMATOLOGY HISTORY: Mrs. Dunaway is a lady with multiple myeloma (IgG kappa). High risk, t(14;16).  1. On 05/01/2017, WBC of 3.4, hemoglobin of 10.2 and platelet of 154.  2. SPEP on 07/07/2017 revealed M-spike of 1.8.  3. Bone marrow biopsy on 07/12/2017 reveals kappa monotypic plasma cell population consistent with multiple myeloma.  Bone marrow is 70% cellular.  Plasma cell is 50-60%.     -There is gain of chromosome 1, 5, 9, 15, and 17.  There is translocation 14;16.   4.  On 07/18/2017:  -M-spike of 1.9.   -Immunofixation reveals monoclonal IgG kappa.    -IgG level of 2970.  IgA of 15 and IgM of 175.    -Kappa free light chain of 67.7.  Lambda free light chain of 1.42.  Ratio of kappa to lambda of 47.71.    -Beta 2 microglobulin of 3.4.   5. PET scan on 07/24/2017 reveals several focal areas of increased FDG uptake consistent with multiple myeloma.  There is probable epithelial cyst in tail of the pancreas.  No associated abnormal FDG activity.  Left thyroid cysts or nodules without any FDG activity.  There is a 0.3 cm left upper lobe lung nodule.   8.  Velcade, Revlimid and dexamethasone between on 08/14/2017 and 04/30/2018. Velcade stopped.   -Revlimid and dexamethasone continued.     SUBJECTIVE:  Ms. Dunaway is an 84-year-old female with high-risk multiple myeloma.  She is currently on Revlimid and dexamethasone.  Overall, she is tolerating it well.      Overall, she is doing good.  No headache.  No dizziness.  No chest pain or difficulty breathing.  No abdominal pain, nausea or vomiting.  Appetite is good.  No urinary or bowel complaints.  No bleeding.  No fever, chills or night sweats.  She has some fatigue.      PHYSICAL EXAMINATION:   GENERAL:  She is alert, oriented x 3. ECOG PS of 2.   FACE:  No swelling.   EYES:  No icterus.   THROAT:  No ulcer or thrush.   NECK:  Supple, no lymphadenopathy.   LUNGS:  Decreased air entry at the bases.  Occasional wheezing.   HEART:   Regular.   ABDOMEN:  Soft and nontender, no mass.   EXTREMITIES:  No edema.  No calf swelling or tenderness.   SKIN:  No rash.      LABORATORY DATA:  Reviewed.      ASSESSMENT:   1.  An 84-year-old female with high-risk multiple myeloma.   2.  Fatigue from malignancy.      PLAN:   1.  The patient clinically is stable from myeloma.  She is on Revlimid and dexamethasone, which she will continue.  She is tolerating it well.  Side effects were all discussed.  She will continue on aspirin to reduce risk of deep venous thrombosis.   2.  The patient will continue on Zometa every 3 months.  She is tolerating it well.  She does not have any jaw or dental related symptoms.   3.  She will be seen back in 4-5 weeks with labs.  Advised her to call us with any questions or concerns.         YVES PEREZ MD             D: 10/03/2019   T: 10/03/2019   MT:       Name:     DUGLAS CASTILLO   MRN:      [id number]        Account:      [id number]   :      1935           Visit Date:   10/03/2019      Document: [id number]

## 2019-10-07 ENCOUNTER — OFFICE VISIT (OUTPATIENT)
Dept: FAMILY MEDICINE | Facility: CLINIC | Age: 84
End: 2019-10-07
Payer: MEDICARE

## 2019-10-07 VITALS
SYSTOLIC BLOOD PRESSURE: 121 MMHG | BODY MASS INDEX: 28.52 KG/M2 | WEIGHT: 155 LBS | HEART RATE: 84 BPM | OXYGEN SATURATION: 98 % | TEMPERATURE: 99.1 F | DIASTOLIC BLOOD PRESSURE: 72 MMHG | HEIGHT: 62 IN

## 2019-10-07 DIAGNOSIS — F41.1 GAD (GENERALIZED ANXIETY DISORDER): ICD-10-CM

## 2019-10-07 DIAGNOSIS — C90.00 MULTIPLE MYELOMA NOT HAVING ACHIEVED REMISSION (H): ICD-10-CM

## 2019-10-07 DIAGNOSIS — I10 BENIGN ESSENTIAL HYPERTENSION: Primary | ICD-10-CM

## 2019-10-07 PROCEDURE — 99213 OFFICE O/P EST LOW 20 MIN: CPT | Performed by: INTERNAL MEDICINE

## 2019-10-07 ASSESSMENT — MIFFLIN-ST. JEOR: SCORE: 1111.33

## 2019-10-07 NOTE — PROGRESS NOTES
Subjective     Quiana Dunaway is a 84 year old female who presents to clinic today for the following health issues:    HPI     Really pleasant 84-year-old female with a degree of anxiety regarding her health state that requires frequent follow-up.  Her anxiety symptoms seem to be well controlled.  She follows closely with Dr. Parry for her multiple myeloma which seems to be under good control.  Her recent lab tests appeared to be stable.  She has hypertension which has been under good control recently.  She otherwise feels well and is without specific complaints today.      Patient Active Problem List   Diagnosis     Benign essential hypertension     Multiple myeloma not having achieved remission (H)     Benzodiazepine overdose, accidental or unintentional, initial encounter     Hypokalemia     Pacemaker     ROBER (generalized anxiety disorder)     Past Surgical History:   Procedure Laterality Date     BONE MARROW BIOPSY, BONE SPECIMEN, NEEDLE/TROCAR N/A 7/12/2017    Procedure: BIOPSY BONE MARROW;  BONE MARROW BIOPSY ;  Surgeon: Grace Vela MD;  Location:  GI     IMPLANT PACEMAKER  03/2017    AV block       Social History     Tobacco Use     Smoking status: Never Smoker     Smokeless tobacco: Never Used   Substance Use Topics     Alcohol use: No     Alcohol/week: 0.0 standard drinks     Family History   Problem Relation Age of Onset     Unknown/Adopted Mother      Unknown/Adopted Father          Current Outpatient Medications   Medication Sig Dispense Refill     acyclovir (ZOVIRAX) 400 MG tablet Take 1 tablet (400 mg) by mouth 2 times daily Viral Prophylaxis. 60 tablet 5     aspirin 81 MG chewable tablet Take 81 mg by mouth daily        dexamethasone (DECADRON) 4 MG tablet Take 5 tablets (20 mg) by mouth once a week Once a week with food on Days 1,8,15. 20 tablet 0     dexamethasone (DECADRON) 4 MG tablet Take 5 tablets (20 mg) by mouth once a week Once a week with food on Days 1,8,15. 20 tablet 0      dexamethasone (DECADRON) 4 MG tablet Take 5 tablets (20 mg) by mouth once a week Once a week with food on Days 1,8,15. 20 tablet 3     escitalopram (LEXAPRO) 5 MG tablet TAKE 1 TABLET BY MOUTH DAILY 90 tablet 0     LENalidomide (REVLIMID) 10 MG CAPS capsule CHEMO Take 1 capsule (10 mg) by mouth daily for 14 days Take on Days 1 through 14 of 21-day cycle. 14 capsule 0     lisinopril (PRINIVIL/ZESTRIL) 10 MG tablet TAKE 1 TABLET BY MOUTH DAILY 90 tablet 1     Loperamide HCl (IMODIUM A-D PO) Take by mouth as needed       LORazepam (ATIVAN) 0.5 MG tablet Take 1 tablet (0.5 mg) by mouth every 8 hours as needed (Anxiety, Nausea/Vomiting or Sleep) 30 tablet 3     mirabegron (MYRBETRIQ) 25 MG 24 hr tablet Take 1 tablet (25 mg) by mouth daily 90 tablet 3     nitroglycerin (NITROSTAT) 0.4 MG sublingual tablet For chest pain place 1 tablet under the tongue every 5 minutes for 3 doses. If symptoms persist 5 minutes after 1st dose call 911. 25 tablet 0     order for DME Wheelchair. 1 Units 0     order for DME Dispense one 4 wheeled walker with hand brakes and seat 1 Units 0     prochlorperazine (COMPAZINE) 10 MG tablet Take 1 tablet (10 mg) by mouth every 6 hours as needed (Nausea/Vomiting) 30 tablet 3     Zoledronic Acid (ZOMETA IV)        LENalidomide (REVLIMID) 10 MG CAPS capsule CHEMO Take 1 capsule (10 mg) by mouth daily for 14 days Take on Days 1 through 14 of 21-day cycle. 14 capsule 0     LENalidomide (REVLIMID) 10 MG CAPS capsule CHEMO Take 1 capsule (10 mg) by mouth daily for 14 days Take on Days 1 through 14 of 21-day cycle. 14 capsule 0     Allergies   Allergen Reactions     Levaquin [Levofloxacin] Other (See Comments)     Tingling in feet after 1 dose on 8/7/19         Reviewed and updated as needed this visit by Provider         Review of Systems   ROS COMP: Constitutional, HEENT, cardiovascular, pulmonary, gi and gu systems are negative, except as otherwise noted.      Objective    /72 (BP Location: Right  "arm, Cuff Size: Adult Regular)   Pulse 84   Temp 99.1  F (37.3  C) (Tympanic)   Ht 1.575 m (5' 2\")   Wt 70.3 kg (155 lb)   SpO2 98%   Breastfeeding? No   BMI 28.35 kg/m    Body mass index is 28.35 kg/m .  Physical Exam   GENERAL: healthy, alert and no distress  RESP: lungs clear to auscultation - no rales, rhonchi or wheezes  CV: Heart with regular rate and rhythm.   NEURO: Normal strength and tone, mentation intact and speech normal  PSYCH: mentation appears normal, affect normal/bright    Diagnostic Test Results:  Results for orders placed or performed in visit on 10/03/19   Comprehensive metabolic panel   Result Value Ref Range    Sodium 141 133 - 144 mmol/L    Potassium 3.7 3.4 - 5.3 mmol/L    Chloride 107 94 - 109 mmol/L    Carbon Dioxide 29 20 - 32 mmol/L    Anion Gap 5 3 - 14 mmol/L    Glucose 80 70 - 99 mg/dL    Urea Nitrogen 14 7 - 30 mg/dL    Creatinine 0.62 0.52 - 1.04 mg/dL    GFR Estimate 83 >60 mL/min/[1.73_m2]    GFR Estimate If Black >90 >60 mL/min/[1.73_m2]    Calcium 8.9 8.5 - 10.1 mg/dL    Bilirubin Total 0.5 0.2 - 1.3 mg/dL    Albumin 3.3 (L) 3.4 - 5.0 g/dL    Protein Total 6.5 (L) 6.8 - 8.8 g/dL    Alkaline Phosphatase 71 40 - 150 U/L    ALT 16 0 - 50 U/L    AST 13 0 - 45 U/L   CBC with platelets differential   Result Value Ref Range    WBC 4.1 4.0 - 11.0 10e9/L    RBC Count 4.16 3.8 - 5.2 10e12/L    Hemoglobin 12.8 11.7 - 15.7 g/dL    Hematocrit 37.7 35.0 - 47.0 %    MCV 91 78 - 100 fl    MCH 30.8 26.5 - 33.0 pg    MCHC 34.0 31.5 - 36.5 g/dL    RDW 13.5 10.0 - 15.0 %    Platelet Count 227 150 - 450 10e9/L    Diff Method Automated Method     % Neutrophils 44.9 %    % Lymphocytes 37.2 %    % Monocytes 15.0 %    % Eosinophils 1.4 %    % Basophils 0.5 %    % Immature Granulocytes 1.0 %    Nucleated RBCs 0 0 /100    Absolute Neutrophil 1.9 1.6 - 8.3 10e9/L    Absolute Lymphocytes 1.5 0.8 - 5.3 10e9/L    Absolute Monocytes 0.6 0.0 - 1.3 10e9/L    Absolute Eosinophils 0.1 0.0 - 0.7 10e9/L    " Absolute Basophils 0.0 0.0 - 0.2 10e9/L    Abs Immature Granulocytes 0.0 0 - 0.4 10e9/L    Absolute Nucleated RBC 0.0    IgG   Result Value Ref Range     (L) 695 - 1,620 mg/dL   Protein electrophoresis   Result Value Ref Range    Albumin Fraction 3.7 3.7 - 5.1 g/dL    Alpha 1 Fraction 0.3 0.2 - 0.4 g/dL    Alpha 2 Fraction 0.8 0.5 - 0.9 g/dL    Beta Fraction 0.6 0.6 - 1.0 g/dL    Gamma Fraction 0.6 (L) 0.7 - 1.6 g/dL    Monoclonal Peak 0.4 (H) 0.0 g/dL    ELP Interpretation:       Monoclonal protein (0.4 g/dL) seen in the gamma fraction.  Previously characterized in our   laboratory on 7/18/17 as a monoclonal IgG immunoglobulin of kappa light chain type.   Pathologic significance requires clinical correlation.  YUE Aranda M.D., Ph.D.,   Pathologist.      [place] and lambda light chain   Result Value Ref Range    Kappa Free Lt Chain 6.19 (H) 0.33 - 1.94 mg/dL    Lambda Free Lt Chain 0.89 0.57 - 2.63 mg/dL    Kappa Lambda Ratio 6.96 (H) 0.26 - 1.65           Assessment & Plan     1. Benign essential hypertension  Under good control    2. Multiple myeloma not having achieved remission (H)  Continue follow-up with Dr. Parry as directed, renal function and hemoglobin are stable    3. ROBER (generalized anxiety disorder)  Under good control although she does require frequent follow-up for reassurance                 Return in about 2 months (around 12/7/2019) for recheck .    César Chung MD  [facility]

## 2019-10-10 NOTE — PROGRESS NOTES
Oral Chemotherapy Monitoring Program.     Patient currently on Revlimid therapy.     Reviewed labs from 12/03/18. No concerning abnormalities.     Questions answered to patient's satisfaction.     Will follow up in 3 weeks with repeat labs    Martinez Gallardo, PharmD, BCOP  December 4, 2018    
[FreeTextEntry1] : PT HERE FOR FOLLOW UP FEELS OK  S/P   TAVR  3/27 DR SAUCEDO  OVERALL FEELSOK\par PT HAS COPD  WAS IN [facility] FOR COPD EXACERBATION AND POSSIBLE PNEUMONIA\par HERE  FOR FOLLOW U P FEELSOK IS NOW OFF PLAVIX\par NO CP NOSB OVERALL DOING WELL WILL WAIT ANOTHER WEEK FOR FLU SHOT\par \par EXTENSIVE REVIEW OF HOSP RECORDS INCLUDING CONSULT LABS AND IMAGING\par DURATION OF REVIEW 35 MINUTES

## 2019-10-11 RX ORDER — LENALIDOMIDE 10 MG/1
10 CAPSULE ORAL DAILY
Qty: 14 CAPSULE | Refills: 0 | Status: SHIPPED | OUTPATIENT
Start: 2019-10-11 | End: 2019-11-05

## 2019-10-11 RX ORDER — DEXAMETHASONE 4 MG/1
20 TABLET ORAL WEEKLY
Qty: 20 TABLET | Refills: 0 | Status: SHIPPED | OUTPATIENT
Start: 2019-10-11 | End: 2019-11-05

## 2019-10-30 ENCOUNTER — HOSPITAL ENCOUNTER (EMERGENCY)
Facility: CLINIC | Age: 84
End: 2019-10-30

## 2019-10-30 ENCOUNTER — HOSPITAL ENCOUNTER (EMERGENCY)
Facility: CLINIC | Age: 84
Discharge: HOME | End: 2019-10-30
Attending: EMERGENCY MEDICINE | Admitting: EMERGENCY MEDICINE
Payer: MEDICARE

## 2019-10-30 ENCOUNTER — APPOINTMENT (OUTPATIENT)
Dept: GENERAL RADIOLOGY | Facility: CLINIC | Age: 84
End: 2019-10-30
Attending: EMERGENCY MEDICINE
Payer: MEDICARE

## 2019-10-30 ENCOUNTER — APPOINTMENT (OUTPATIENT)
Dept: CT IMAGING | Facility: CLINIC | Age: 84
End: 2019-10-30
Attending: EMERGENCY MEDICINE
Payer: MEDICARE

## 2019-10-30 VITALS
BODY MASS INDEX: 27.6 KG/M2 | OXYGEN SATURATION: 95 % | TEMPERATURE: 98.9 F | RESPIRATION RATE: 20 BRPM | SYSTOLIC BLOOD PRESSURE: 111 MMHG | HEART RATE: 75 BPM | HEIGHT: 62 IN | WEIGHT: 150 LBS | DIASTOLIC BLOOD PRESSURE: 52 MMHG

## 2019-10-30 DIAGNOSIS — R07.89 ATYPICAL CHEST PAIN: ICD-10-CM

## 2019-10-30 LAB
ANION GAP SERPL CALCULATED.3IONS-SCNC: 5 MMOL/L (ref 3–14)
BASOPHILS # BLD AUTO: 0 10E9/L (ref 0–0.2)
BASOPHILS NFR BLD AUTO: 0.4 %
BUN SERPL-MCNC: 26 MG/DL (ref 7–30)
CALCIUM SERPL-MCNC: 9.2 MG/DL (ref 8.5–10.1)
CHLORIDE SERPL-SCNC: 106 MMOL/L (ref 94–109)
CO2 SERPL-SCNC: 27 MMOL/L (ref 20–32)
CREAT SERPL-MCNC: 0.66 MG/DL (ref 0.52–1.04)
D DIMER PPP FEU-MCNC: 1.3 UG/ML FEU (ref 0–0.5)
D DIMER PPP FEU-MCNC: NORMAL UG/ML FEU (ref 0–0.5)
DIFFERENTIAL METHOD BLD: NORMAL
EOSINOPHIL # BLD AUTO: 0.1 10E9/L (ref 0–0.7)
EOSINOPHIL NFR BLD AUTO: 2.4 %
ERYTHROCYTE [DISTWIDTH] IN BLOOD BY AUTOMATED COUNT: 13.3 % (ref 10–15)
GFR SERPL CREATININE-BSD FRML MDRD: 82 ML/MIN/{1.73_M2}
GLUCOSE SERPL-MCNC: 98 MG/DL (ref 70–99)
HCT VFR BLD AUTO: 39.1 % (ref 35–47)
HGB BLD-MCNC: 13.2 G/DL (ref 11.7–15.7)
IMM GRANULOCYTES # BLD: 0 10E9/L (ref 0–0.4)
IMM GRANULOCYTES NFR BLD: 0.4 %
INTERPRETATION ECG - MUSE: NORMAL
LYMPHOCYTES # BLD AUTO: 1 10E9/L (ref 0.8–5.3)
LYMPHOCYTES NFR BLD AUTO: 19.1 %
MCH RBC QN AUTO: 30.6 PG (ref 26.5–33)
MCHC RBC AUTO-ENTMCNC: 33.8 G/DL (ref 31.5–36.5)
MCV RBC AUTO: 91 FL (ref 78–100)
MONOCYTES # BLD AUTO: 0.8 10E9/L (ref 0–1.3)
MONOCYTES NFR BLD AUTO: 15.2 %
NEUTROPHILS # BLD AUTO: 3.4 10E9/L (ref 1.6–8.3)
NEUTROPHILS NFR BLD AUTO: 62.5 %
NRBC # BLD AUTO: 0 10*3/UL
NRBC BLD AUTO-RTO: 0 /100
PLATELET # BLD AUTO: 232 10E9/L (ref 150–450)
POTASSIUM SERPL-SCNC: 4.2 MMOL/L (ref 3.4–5.3)
RBC # BLD AUTO: 4.31 10E12/L (ref 3.8–5.2)
SODIUM SERPL-SCNC: 138 MMOL/L (ref 133–144)
TROPONIN I SERPL-MCNC: <0.015 UG/L (ref 0–0.04)
WBC # BLD AUTO: 5.4 10E9/L (ref 4–11)

## 2019-10-30 PROCEDURE — 80048 BASIC METABOLIC PNL TOTAL CA: CPT | Performed by: EMERGENCY MEDICINE

## 2019-10-30 PROCEDURE — 25000125 ZZHC RX 250: Performed by: EMERGENCY MEDICINE

## 2019-10-30 PROCEDURE — 25000128 H RX IP 250 OP 636: Performed by: EMERGENCY MEDICINE

## 2019-10-30 PROCEDURE — 71275 CT ANGIOGRAPHY CHEST: CPT

## 2019-10-30 PROCEDURE — 25000132 ZZH RX MED GY IP 250 OP 250 PS 637: Mod: GY | Performed by: EMERGENCY MEDICINE

## 2019-10-30 PROCEDURE — 85025 COMPLETE CBC W/AUTO DIFF WBC: CPT | Performed by: EMERGENCY MEDICINE

## 2019-10-30 PROCEDURE — 99285 EMERGENCY DEPT VISIT HI MDM: CPT | Mod: 25

## 2019-10-30 PROCEDURE — 85379 FIBRIN DEGRADATION QUANT: CPT | Performed by: EMERGENCY MEDICINE

## 2019-10-30 PROCEDURE — 84484 ASSAY OF TROPONIN QUANT: CPT | Performed by: EMERGENCY MEDICINE

## 2019-10-30 PROCEDURE — 71046 X-RAY EXAM CHEST 2 VIEWS: CPT

## 2019-10-30 PROCEDURE — 93005 ELECTROCARDIOGRAM TRACING: CPT

## 2019-10-30 RX ORDER — ASPIRIN 81 MG/1
162 TABLET, CHEWABLE ORAL ONCE
Status: COMPLETED | OUTPATIENT
Start: 2019-10-30 | End: 2019-10-30

## 2019-10-30 RX ORDER — IOPAMIDOL 755 MG/ML
59 INJECTION, SOLUTION INTRAVASCULAR ONCE
Status: COMPLETED | OUTPATIENT
Start: 2019-10-30 | End: 2019-10-30

## 2019-10-30 RX ADMIN — ASPIRIN 81 MG 162 MG: 81 TABLET ORAL at 10:30

## 2019-10-30 RX ADMIN — IOPAMIDOL 59 ML: 755 INJECTION, SOLUTION INTRAVENOUS at 12:36

## 2019-10-30 RX ADMIN — SODIUM CHLORIDE 86 ML: 9 INJECTION, SOLUTION INTRAVENOUS at 12:36

## 2019-10-30 ASSESSMENT — ENCOUNTER SYMPTOMS
VOMITING: 0
ABDOMINAL PAIN: 0
DIAPHORESIS: 0
SHORTNESS OF BREATH: 0
RHINORRHEA: 0
COUGH: 0

## 2019-10-30 ASSESSMENT — MIFFLIN-ST. JEOR: SCORE: 1088.65

## 2019-10-30 NOTE — ED TRIAGE NOTES
Chest pain last night - awoke from sleep at 0100 - midsternal pressure now radiating into right arm - pt took tums that seemed to help

## 2019-10-30 NOTE — ED PROVIDER NOTES
History     Chief Complaint:  Chest Pain    HPI   Quiana Dunaway is a 84 year old female with a history of multiple melanoma currently being treated with chemotherapy and on a pacemaker who presents with chest pain. The patient states that last night she had pain in her chest. She states the pain was in the center of her chest and was throbbing. She states it was a 5 at the time, lasted several hours, and then resolved. The patient states she also had a similar pain the week before but this resolved. The patient denies current shortness of breath or leg swelling. The patient notes her throat hurt from several days but she denies vomiting, abdominal pain, diaphoresis, cough, or rhinorrhea.     CARDIAC RISK FACTORS:  Sex:    female  Tobacco:   no  Hypertension:   Yes    PE/DVT RISK FACTORS:  Sex:    female  Tobacco:   no  Cancer:   yes    Allergies:  Levaquin     Medications:    Aspirin 81 mg   Lexapro  Revlimid (chemotherapy)   Lisinopril  Loperamide   Lorazepam  Nitroglycerin   Compazine   Zometa     Past Medical History:    Hypertension  Multiple myeloma not having achieved remission  Pacemaker  Pancytopenia   Generalized anxiety disorder   Benzodiazepine overdose   GERD  Osteopenia    Past Surgical History:    Bone marrow biopsy  Implant pacemaker     Family History:   History reviewed. No pertinent family history.    Social History:  Lives alone  Smoking Status: Never Smoker  Smokeless Tobacco: Never Used  Alcohol Use: No  Drug Use: No  PCP: César Chung  Marital Status:       Review of Systems   Constitutional: Negative for diaphoresis.   HENT: Negative for rhinorrhea.    Respiratory: Negative for cough and shortness of breath.    Cardiovascular: Positive for chest pain. Negative for leg swelling.   Gastrointestinal: Negative for abdominal pain and vomiting.   All other systems reviewed and are negative.    Physical Exam     Patient Vitals for the past 24 hrs:   BP Temp Temp src Pulse Resp SpO2  "Height Weight   10/30/19 0955 118/53 98.9  F (37.2  C) Oral 86 20 94 % 1.575 m (5' 2\") 68 kg (150 lb)     Physical Exam  General: Alert and cooperative with exam. Patient in mild distress. Normal mentation.   Head:  Scalp is NC/AT  Eyes:  No scleral icterus, PERRL  ENT:  The external nose and ears are normal. The oropharynx is normal and without erythema; mucus membranes are moist. Uvula midline, no evidence of deep space infection.  Neck:  Normal range of motion without rigidity.  CV:  Regular rate and rhythm; pacer present    No pathologic murmur   Resp:  Breath sounds are clear bilaterally    Non-labored, no retractions or accessory muscle use  GI:  Abdomen is soft, no distension, no tenderness. No peritoneal signs  MS:  No lower extremity edema   Skin:  Warm and dry, No rash or lesions noted.  Neuro: Oriented x 3. No gross motor deficits.      Emergency Department Course   ECG:  ECG taken at 1004, ECG read at 1005  Atrial paced rhythm  Nonspecific T wave abnormality  Abnormal ECG  No significant change when compared to EKG dated 8/6/18.  Rate 79 bpm. KS interval 196 ms. QRS duration 86 ms. QT/QTc 390/447 ms. P-R-T axes -16 9 56.    Imaging:  Radiology findings were communicated with the patient and family who voiced understanding of the findings.    CT Chest Pulmonary Embolism w Contrast  1. No pulmonary embolism, acute thoracic aortic abnormality, or acute  airspace disease. Resolved airspace disease previously noted at the  medial right lower lobe.  2. Mild coronary artery calcifications.  3. Stable technically indeterminant nodule at the medial inferior left  breast. This is hypodense and could be a subcutaneous/sebaceous cyst,  recommend further workup as clinically indicated.  Reading per radiology     Chest XR,  PA & LAT  No acute cardiopulmonary disease. Stable left chest  pacemaker.  ANNE VALLES MD    Laboratory:  Laboratory findings were communicated with the patient and family who voiced understanding " of the findings.    CBC: WBC 5.4, HGB 13.2,   BMP: WNL (Creatinine 0.66)  Troponin (Collected 1014): <0.015  Ddimer(1014): hemolyzed     Ddimer(1115) 1.3(H)    Interventions:  1030 Aspirin 162 mg Oral    Emergency Department Course:  Nursing notes and vitals reviewed.    1004 I performed an exam of the patient as documented above.     IV was inserted and blood was drawn for laboratory testing, results above.    The patient was sent for a XR Chest while in the emergency department, results above.     1100 I returned to update the patient and family.    1115 Repeat Ddimer obtained.     The patient was sent for a CT Chest pulmonary embolism while in the emergency department, results above.     1300 I returned to update the patient and family.     Findings and plan explained to the Patient. Patient discharged home with instructions regarding supportive care, medications, and reasons to return. The importance of close follow-up was reviewed.     Impression & Plan    Medical Decision Making:  Patient is a 84-year-old female presents with episode of atypical chest pain now resolved.  Patient's medical history and records were reviewed.  Initial consideration for, but not limited to, ACS/MI, esophageal spasm/GERD, MSK pain, infectious process, PE, among others.  Labs, EKG, and imaging was obtained.  EKG demonstrates paced rhythm with no significant change compared to previous EKG.  Chest x-ray unremarkable.  Labs notable only for elevated d-dimer; CTA of the chest without evidence of acute pathology; see above; findings discussed with patient.  Troponin normal and patient's presentation is very atypical for ACS; past history of atypical chest pain with negative work-ups.  HEART score = 3; patient appropriate for outpatient follow-up.  No emergent cause to patient's episode could be determined.  Recommend continued supportive care and close follow-up with PCP.  Return precautions were discussed.  Patient discharged  home.    Diagnosis:    ICD-10-CM    1. Atypical chest pain R07.89        Disposition:   The patient is discharged to home.    Scribe Disclosure:  I, Dawna Zabala, am serving as a scribe at 10:04 AM on 10/30/2019 to document services personally performed by Shyam Wilkins DO based on my observations and the provider's statements to me.    EMERGENCY DEPARTMENT       Shyam Wilkins DO  10/30/19 2100

## 2019-10-30 NOTE — ED AVS SNAPSHOT
Emergency Department  [address] [place] [address]  Phone:  [phone]  Fax:  [phone]                                    Quiana Dunaway   MRN: [id number]    Department:   Emergency Department   Date of Visit:  10/30/2019           After Visit Summary Signature Page    I have received my discharge instructions, and my questions have been answered. I have discussed any challenges I see with this plan with the nurse or doctor.    ..........................................................................................................................................  Patient/Patient Representative Signature      ..........................................................................................................................................  Patient Representative Print Name and Relationship to Patient    ..................................................               ................................................  Date                                   Time    ..........................................................................................................................................  Reviewed by Signature/Title    ...................................................              ..............................................  Date                                               Time          22EPIC Rev 08/18

## 2019-10-31 ENCOUNTER — TRANSFERRED RECORDS (OUTPATIENT)
Dept: HEALTH INFORMATION MANAGEMENT | Facility: CLINIC | Age: 84
End: 2019-10-31

## 2019-11-01 ENCOUNTER — HEALTH MAINTENANCE LETTER (OUTPATIENT)
Age: 84
End: 2019-11-01

## 2019-11-01 DIAGNOSIS — C90.00 MULTIPLE MYELOMA NOT HAVING ACHIEVED REMISSION (H): Primary | ICD-10-CM

## 2019-11-05 ENCOUNTER — HOSPITAL ENCOUNTER (OUTPATIENT)
Facility: CLINIC | Age: 84
Setting detail: SPECIMEN
Discharge: HOME | End: 2019-11-05
Attending: INTERNAL MEDICINE | Admitting: INTERNAL MEDICINE
Payer: MEDICARE

## 2019-11-05 ENCOUNTER — TELEPHONE (OUTPATIENT)
Dept: PHARMACY | Facility: CLINIC | Age: 84
End: 2019-11-05

## 2019-11-05 ENCOUNTER — ONCOLOGY VISIT (OUTPATIENT)
Dept: ONCOLOGY | Facility: CLINIC | Age: 84
End: 2019-11-05
Attending: INTERNAL MEDICINE
Payer: MEDICARE

## 2019-11-05 ENCOUNTER — INFUSION THERAPY VISIT (OUTPATIENT)
Dept: INFUSION THERAPY | Facility: CLINIC | Age: 84
End: 2019-11-05
Attending: INTERNAL MEDICINE
Payer: MEDICARE

## 2019-11-05 VITALS
HEART RATE: 82 BPM | TEMPERATURE: 98.4 F | BODY MASS INDEX: 28.52 KG/M2 | RESPIRATION RATE: 16 BRPM | HEIGHT: 62 IN | OXYGEN SATURATION: 97 % | DIASTOLIC BLOOD PRESSURE: 72 MMHG | WEIGHT: 155 LBS | SYSTOLIC BLOOD PRESSURE: 116 MMHG

## 2019-11-05 DIAGNOSIS — C90.00 MULTIPLE MYELOMA NOT HAVING ACHIEVED REMISSION (H): Primary | ICD-10-CM

## 2019-11-05 DIAGNOSIS — C90.00 MULTIPLE MYELOMA NOT HAVING ACHIEVED REMISSION (H): ICD-10-CM

## 2019-11-05 LAB
ALBUMIN SERPL-MCNC: 3.4 G/DL (ref 3.4–5)
ALP SERPL-CCNC: 78 U/L (ref 40–150)
ALT SERPL W P-5'-P-CCNC: 20 U/L (ref 0–50)
ANION GAP SERPL CALCULATED.3IONS-SCNC: 5 MMOL/L (ref 3–14)
AST SERPL W P-5'-P-CCNC: 9 U/L (ref 0–45)
BASOPHILS # BLD AUTO: 0 10E9/L (ref 0–0.2)
BASOPHILS NFR BLD AUTO: 0 %
BILIRUB SERPL-MCNC: 0.6 MG/DL (ref 0.2–1.3)
BUN SERPL-MCNC: 22 MG/DL (ref 7–30)
CALCIUM SERPL-MCNC: 8.7 MG/DL (ref 8.5–10.1)
CHLORIDE SERPL-SCNC: 108 MMOL/L (ref 94–109)
CO2 SERPL-SCNC: 27 MMOL/L (ref 20–32)
CREAT SERPL-MCNC: 0.66 MG/DL (ref 0.52–1.04)
DIFFERENTIAL METHOD BLD: NORMAL
EOSINOPHIL # BLD AUTO: 0.1 10E9/L (ref 0–0.7)
EOSINOPHIL NFR BLD AUTO: 1 %
ERYTHROCYTE [DISTWIDTH] IN BLOOD BY AUTOMATED COUNT: 13 % (ref 10–15)
GFR SERPL CREATININE-BSD FRML MDRD: 82 ML/MIN/{1.73_M2}
GLUCOSE SERPL-MCNC: 110 MG/DL (ref 70–99)
HCT VFR BLD AUTO: 38.5 % (ref 35–47)
HGB BLD-MCNC: 13.3 G/DL (ref 11.7–15.7)
LYMPHOCYTES # BLD AUTO: 1.2 10E9/L (ref 0.8–5.3)
LYMPHOCYTES NFR BLD AUTO: 16 %
MCH RBC QN AUTO: 31 PG (ref 26.5–33)
MCHC RBC AUTO-ENTMCNC: 34.5 G/DL (ref 31.5–36.5)
MCV RBC AUTO: 90 FL (ref 78–100)
MONOCYTES # BLD AUTO: 0.8 10E9/L (ref 0–1.3)
MONOCYTES NFR BLD AUTO: 10 %
NEUTROPHILS # BLD AUTO: 5.5 10E9/L (ref 1.6–8.3)
NEUTROPHILS NFR BLD AUTO: 73 %
PLATELET # BLD AUTO: 233 10E9/L (ref 150–450)
PLATELET # BLD EST: NORMAL 10*3/UL
POTASSIUM SERPL-SCNC: 3.2 MMOL/L (ref 3.4–5.3)
PROT SERPL-MCNC: 6.8 G/DL (ref 6.8–8.8)
RBC # BLD AUTO: 4.29 10E12/L (ref 3.8–5.2)
RBC MORPH BLD: NORMAL
SODIUM SERPL-SCNC: 140 MMOL/L (ref 133–144)
WBC # BLD AUTO: 7.6 10E9/L (ref 4–11)

## 2019-11-05 PROCEDURE — 99214 OFFICE O/P EST MOD 30 MIN: CPT | Performed by: INTERNAL MEDICINE

## 2019-11-05 PROCEDURE — 85025 COMPLETE CBC W/AUTO DIFF WBC: CPT | Performed by: INTERNAL MEDICINE

## 2019-11-05 PROCEDURE — 80053 COMPREHEN METABOLIC PANEL: CPT | Performed by: INTERNAL MEDICINE

## 2019-11-05 PROCEDURE — 36415 COLL VENOUS BLD VENIPUNCTURE: CPT

## 2019-11-05 PROCEDURE — G0463 HOSPITAL OUTPT CLINIC VISIT: HCPCS

## 2019-11-05 PROCEDURE — 25000132 ZZH RX MED GY IP 250 OP 250 PS 637: Mod: GY | Performed by: INTERNAL MEDICINE

## 2019-11-05 RX ORDER — DEXAMETHASONE 4 MG/1
20 TABLET ORAL WEEKLY
Qty: 20 TABLET | Refills: 0 | Status: SHIPPED | OUTPATIENT
Start: 2019-11-05 | End: 2019-12-03

## 2019-11-05 RX ORDER — LENALIDOMIDE 10 MG/1
10 CAPSULE ORAL DAILY
Qty: 14 CAPSULE | Refills: 0 | Status: SHIPPED | OUTPATIENT
Start: 2019-11-05 | End: 2019-12-02

## 2019-11-05 RX ORDER — POTASSIUM CHLORIDE 750 MG/1
40 TABLET, EXTENDED RELEASE ORAL ONCE
Status: COMPLETED | OUTPATIENT
Start: 2019-11-05 | End: 2019-11-05

## 2019-11-05 RX ADMIN — POTASSIUM CHLORIDE 40 MEQ: 750 TABLET, FILM COATED, EXTENDED RELEASE ORAL at 14:42

## 2019-11-05 ASSESSMENT — MIFFLIN-ST. JEOR: SCORE: 1111.33

## 2019-11-05 ASSESSMENT — PAIN SCALES - GENERAL: PAINLEVEL: NO PAIN (0)

## 2019-11-05 NOTE — PROGRESS NOTES
Medical Assistant Note:  Quiana Dunaway presents today for BLOOD DRAW.    Patient seen by provider today: Yes: CHRIS.   present during visit today: Not Applicable.    Concerns: No Concerns.    Procedure:  Lab draw site: LAC, Needle type: BF, Gauge: 23.    Post Assessment:  Labs drawn without difficulty: Yes.    Discharge Plan:  Departure Mode: Ambulatory.    Face to Face Time: 5 MIN  .    Mariela Lyn, CMA

## 2019-11-05 NOTE — PATIENT INSTRUCTIONS
1. Continue revlimid and dexamethasone.  2. Zometa today and every 3 months.  3. Follow-up in 4-5 weeks with labs. Labs as per treatment plan.

## 2019-11-05 NOTE — Clinical Note
"    11/5/2019         RE: Quiana Dunaway  [address] [facility] [address]        Dear Colleague,    Thank you for referring your patient, Quiana Dunaway, to the [facility]. Please see a copy of my visit note below.    Oncology Rooming Note    November 5, 2019 1:53 PM   Quiana Dunaway is a 84 year old female who presents for:    Chief Complaint   Patient presents with     Oncology Clinic Visit     Initial Vitals: /72   Pulse 82   Temp 98.4  F (36.9  C) (Oral)   Resp 16   Ht 1.575 m (5' 2\")   Wt 70.3 kg (155 lb)   SpO2 97%   BMI 28.35 kg/m    Estimated body mass index is 28.35 kg/m  as calculated from the following:    Height as of this encounter: 1.575 m (5' 2\").    Weight as of this encounter: 70.3 kg (155 lb). Body surface area is 1.75 meters squared.  No Pain (0) Comment: Data Unavailable   No LMP recorded. Patient is postmenopausal.  Allergies reviewed: Yes  Medications reviewed: Yes    Medications: Medication refills not needed today.  Pharmacy name entered into [employer]: [id number] - [place], [place] - [address] [facility] [address]    Clinical concerns: no      Lauren Arredondo [facility]              Visit Date:   11/05/2019     HEMATOLOGY HISTORY: Mrs. Dunaway is a lady with multiple myeloma (IgG kappa). High risk, t(14;16).  1. On 05/01/2017, WBC of 3.4, hemoglobin of 10.2 and platelet of 154.  2. SPEP on 07/07/2017 revealed M-spike of 1.8.  3. Bone marrow biopsy on 07/12/2017 reveals kappa monotypic plasma cell population consistent with multiple myeloma.  Bone marrow is 70% cellular.  Plasma cell is 50-60%.     -There is gain of chromosome 1, 5, 9, 15, and 17.  There is translocation 14;16.   4.  On 07/18/2017:  -M-spike of 1.9.   -Immunofixation reveals monoclonal IgG kappa.    -IgG level of 2970.  IgA of 15 and IgM of 175.    -Kappa free light chain of 67.7.  Lambda free light chain of 1.42.  Ratio of kappa to lambda of 47.71.    -Beta 2 " microglobulin of 3.4.   5. PET scan on 07/24/2017 reveals several focal areas of increased FDG uptake consistent with multiple myeloma.  There is probable epithelial cyst in tail of the pancreas.  No associated abnormal FDG activity.  Left thyroid cysts or nodules without any FDG activity.  There is a 0.3 cm left upper lobe lung nodule.   8.  Velcade, Revlimid and dexamethasone between on 08/14/2017 and 04/30/2018. Velcade stopped.   -Revlimid and dexamethasone continued.     SUBJECTIVE:  Ms. Dunaway is an 84-year-old female with high-risk IgG kappa multiple myeloma.  She is currently on Revlimid and dexamethasone.  Overall, her disease has been stable.      She has fatigue.  No headache.  No dizziness.  No chest pain.  No shortness of breath.  No abdominal pain, nausea or vomiting.  Appetite is good.  No urinary or bowel complaints.  No bleeding.  No fever, chills or night sweats.      PHYSICAL EXAMINATION:   GENERAL:  Alert and oriented x 3.   VITAL SIGNS:  Reviewed.  ECOG PS of 2.     EYES:  No icterus.   THROAT:  No ulcer. No thrush.   NECK:  Supple. No lymphadenopathy. No thyromegaly.   AXILLAE:  No lymphadenopathy.   LUNGS:  Good air entry bilaterally.  No crackles or wheezing.   HEART:  Regular.  No murmur.   GI:  Soft.  Nontender. No mass.   EXTREMITIES:  No pedal edema.  No calf swelling or tenderness.   SKIN:  No rash.      LABORATORY DATA:  Reviewed.      ASSESSMENT:   1.  An 84-year-old female with high-risk multiple myeloma.  Myeloma is stable.   2.  Fatigue secondary to her age and myeloma.      PLAN:   1.  The patient overall is doing well.  She has fatigue which would go along with her age and also from myeloma. Unlikely that fatigue will improve.     Myeloma is stable. She will continue on Revlimid and dexamethasone.  She is overall tolerating it well.  Side effects reviewed.     2.  The patient has hypokalemia.  We will give her oral potassium.     3. The patient will continue on aspirin.     4.  The patient will continue with bisphosphonate every 3 months. No jaw or dental problem.    5.  She and her daughter had a few questions, which were all answered.  I will see her in 2 months.  Advised her to see a physician sooner if she has bone pain, bone fracture, recurrent infection, worsening weakness or any other concerns.         YVES PEREZ MD             D: 2019   T: 2019   MT: JOEY      Name:     DUGLAS CASTILLO   MRN:      [id number]        Account:      [id number]   :      1935           Visit Date:   2019      Document: [id number]        Again, thank you for allowing me to participate in the care of your patient.        Sincerely,        Yves Perez MD

## 2019-11-05 NOTE — TELEPHONE ENCOUNTER
Oral Chemotherapy Monitoring Program   Medication:Revlimid  Rx: 10mg PO daily on days 1 through 14 of 21 day cycle   Auth #: [id number]   Risk Category:Adult Female Not of Reproductive Potential  Routine survey questions reviewed.   Rx to be Escribed to [facility]    Anai Willard Oncology Pharmacy Liaison  [phone]

## 2019-11-06 ENCOUNTER — DOCUMENTATION ONLY (OUTPATIENT)
Dept: PHARMACY | Facility: CLINIC | Age: 84
End: 2019-11-06

## 2019-11-06 NOTE — PROGRESS NOTES
Oral Chemotherapy Monitoring Program.    Patient currently on Revlimid therapy.    Reviewed lab results from 11/5/19. There were no concerning abnormalities.    Will follow up in 4 weeks.    Laci Escobar PharmD.

## 2019-11-06 NOTE — PROGRESS NOTES
Visit Date:   11/05/2019     HEMATOLOGY HISTORY: Mrs. Dunaway is a lady with multiple myeloma (IgG kappa). High risk, t(14;16).  1. On 05/01/2017, WBC of 3.4, hemoglobin of 10.2 and platelet of 154.  2. SPEP on 07/07/2017 revealed M-spike of 1.8.  3. Bone marrow biopsy on 07/12/2017 reveals kappa monotypic plasma cell population consistent with multiple myeloma.  Bone marrow is 70% cellular.  Plasma cell is 50-60%.     -There is gain of chromosome 1, 5, 9, 15, and 17.  There is translocation 14;16.   4.  On 07/18/2017:  -M-spike of 1.9.   -Immunofixation reveals monoclonal IgG kappa.    -IgG level of 2970.  IgA of 15 and IgM of 175.    -Kappa free light chain of 67.7.  Lambda free light chain of 1.42.  Ratio of kappa to lambda of 47.71.    -Beta 2 microglobulin of 3.4.   5. PET scan on 07/24/2017 reveals several focal areas of increased FDG uptake consistent with multiple myeloma.  There is probable epithelial cyst in tail of the pancreas.  No associated abnormal FDG activity.  Left thyroid cysts or nodules without any FDG activity.  There is a 0.3 cm left upper lobe lung nodule.   8.  Velcade, Revlimid and dexamethasone between on 08/14/2017 and 04/30/2018. Velcade stopped.   -Revlimid and dexamethasone continued.     SUBJECTIVE:  Ms. Dunaway is an 84-year-old female with high-risk IgG kappa multiple myeloma.  She is currently on Revlimid and dexamethasone.  Overall, her disease has been stable.      She has fatigue.  No headache.  No dizziness.  No chest pain.  No shortness of breath.  No abdominal pain, nausea or vomiting.  Appetite is good.  No urinary or bowel complaints.  No bleeding.  No fever, chills or night sweats.      PHYSICAL EXAMINATION:   GENERAL:  Alert and oriented x 3.   VITAL SIGNS:  Reviewed.  ECOG PS of 2.     EYES:  No icterus.   THROAT:  No ulcer. No thrush.   NECK:  Supple. No lymphadenopathy. No thyromegaly.   AXILLAE:  No lymphadenopathy.   LUNGS:  Good air entry bilaterally.  No crackles  or wheezing.   HEART:  Regular.  No murmur.   GI:  Soft.  Nontender. No mass.   EXTREMITIES:  No pedal edema.  No calf swelling or tenderness.   SKIN:  No rash.      LABORATORY DATA:  Reviewed.      ASSESSMENT:   1.  An 84-year-old female with high-risk multiple myeloma.  Myeloma is stable.   2.  Fatigue secondary to her age and myeloma.      PLAN:   1.  The patient overall is doing well.  She has fatigue which would go along with her age and also from myeloma. Unlikely that fatigue will improve.     Myeloma is stable. She will continue on Revlimid and dexamethasone.  She is overall tolerating it well.  Side effects reviewed.     2.  The patient has hypokalemia.  We will give her oral potassium.     3. The patient will continue on aspirin.     4. The patient will continue with bisphosphonate every 3 months. No jaw or dental problem.    5.  She and her daughter had a few questions, which were all answered.  I will see her in 2 months.  Advised her to see a physician sooner if she has bone pain, bone fracture, recurrent infection, worsening weakness or any other concerns.         YVES PEREZ MD             D: 2019   T: 2019   MT: JOEY      Name:     DUGLAS CASTILLO   MRN:      [id number]        Account:      [id number]   :      1935           Visit Date:   2019      Document: [id number]

## 2019-11-09 DIAGNOSIS — C90.00 MULTIPLE MYELOMA NOT HAVING ACHIEVED REMISSION (H): ICD-10-CM

## 2019-11-21 RX ORDER — LENALIDOMIDE 10 MG/1
10 CAPSULE ORAL DAILY
Qty: 14 CAPSULE | Refills: 0 | Status: SHIPPED | OUTPATIENT
Start: 2019-11-21 | End: 2020-01-08

## 2019-11-21 RX ORDER — DEXAMETHASONE 4 MG/1
20 TABLET ORAL WEEKLY
Qty: 20 TABLET | Refills: 0 | Status: SHIPPED | OUTPATIENT
Start: 2019-11-21 | End: 2019-12-03

## 2019-11-22 ENCOUNTER — TELEPHONE (OUTPATIENT)
Dept: PHARMACY | Facility: CLINIC | Age: 84
End: 2019-11-22

## 2019-11-22 NOTE — TELEPHONE ENCOUNTER
Oral Chemotherapy Monitoring Program   Medication: Revlimid  Rx: 10mg PO daily on days 1 through 14 of 21 day cycle   Auth #: [id number]   Risk Category: adult female of non-child bearing potential  Routine survey questions reviewed.   [facility]    Terrie Bowen [facility] Oncology Pharmacy Liaison  [phone]

## 2019-12-02 ENCOUNTER — TELEPHONE (OUTPATIENT)
Dept: FAMILY MEDICINE | Facility: CLINIC | Age: 84
End: 2019-12-02

## 2019-12-02 ENCOUNTER — OFFICE VISIT (OUTPATIENT)
Dept: FAMILY MEDICINE | Facility: CLINIC | Age: 84
End: 2019-12-02
Payer: MEDICARE

## 2019-12-02 DIAGNOSIS — C90.00 MULTIPLE MYELOMA NOT HAVING ACHIEVED REMISSION (H): ICD-10-CM

## 2019-12-02 DIAGNOSIS — J40 BRONCHITIS: Primary | ICD-10-CM

## 2019-12-02 PROCEDURE — 99213 OFFICE O/P EST LOW 20 MIN: CPT | Performed by: PHYSICIAN ASSISTANT

## 2019-12-02 RX ORDER — AZITHROMYCIN 250 MG/1
TABLET, FILM COATED ORAL
Qty: 6 TABLET | Refills: 0 | Status: SHIPPED | OUTPATIENT
Start: 2019-12-02 | End: 2020-01-08

## 2019-12-02 NOTE — TELEPHONE ENCOUNTER
Reason for Call:  Medication or medication refill:    Do you use a [facility]?  Name of the pharmacy and phone number for the current request:       [id number] - [address]      Name of the medication requested:   azithromycin (ZITHROMAX) 250 MG tablet 6 tablet 0 12/2/2019  --   Sig: Two tablets first day, then one tablet daily for four days.   Sent to pharmacy as: azithromycin (ZITHROMAX) 250 MG tablet   Class: E-Prescribe   Order: [id number]   E-Prescribing Status: Transmission to pharmacy in progress (12/2/2019  2:49 PM CST)         Other request:   Pt and daughter in law have been to pharmacy twice.  Please fax this medication over due to the difficulty of e-scribing as it is not working up to par.        Call taken on 12/2/2019 at 4:07 PM by Zohreh Walsh

## 2019-12-02 NOTE — TELEPHONE ENCOUNTER
Left detailed VM on pharmacy phone giving verbal order for Zpak  Patient informed - will call us back if pharmacy doesn't get VM  Marly RDZ RN

## 2019-12-02 NOTE — PROGRESS NOTES
"Subjective     Quiana Dunaway is a 84 year old female who presents to clinic today for the following health issues:    HPI   RESPIRATORY SYMPTOMS      Duration: 1 week    Description  cough    Severity: moderate    Accompanying signs and symptoms: None    History (predisposing factors):  Hx of recent Pneumonia    Precipitating or alleviating factors: None    Therapies tried and outcome:  none        BP Readings from Last 3 Encounters:   12/03/19 123/62   11/05/19 116/72   10/30/19 111/52    Wt Readings from Last 3 Encounters:   12/03/19 68 kg (150 lb)   11/05/19 70.3 kg (155 lb)   10/30/19 68 kg (150 lb)                      Reviewed and updated as needed this visit by Provider         Review of Systems   ROS COMP: Constitutional, HEENT, cardiovascular, pulmonary, gi and gu systems are negative, except as otherwise noted.      Objective    There were no vitals taken for this visit.  There is no height or weight on file to calculate BMI.  Physical Exam   GENERAL: alert and no distress  EYES: Eyes grossly normal to inspection  RESP: rhonchi throughout and expiratory wheezes throughout  CV: regular rate and rhythm, normal S1 S2, no S3 or S4, no murmur, click or rub, no peripheral edema and peripheral pulses strong  PSYCH: mentation appears normal, affect normal/bright    Diagnostic Test Results:  Labs reviewed in Epic        Assessment & Plan     1. Bronchitis  Higher risk of bacterial secondary to ongoing oncology treatment  - azithromycin (ZITHROMAX) 250 MG tablet; Two tablets first day, then one tablet daily for four days.  Dispense: 6 tablet; Refill: 0    2. Multiple myeloma not having achieved remission (H)    - azithromycin (ZITHROMAX) 250 MG tablet; Two tablets first day, then one tablet daily for four days.  Dispense: 6 tablet; Refill: 0     BMI:   Estimated body mass index is 27.44 kg/m  as calculated from the following:    Height as of 12/3/19: 1.575 m (5' 2\").    Weight as of 12/3/19: 68 kg (150 lb). "               Return in about 1 week (around 12/9/2019) for If symptoms persist or worsen.    Rosalio Carrasquillo PA-C  [facility]

## 2019-12-03 ENCOUNTER — DOCUMENTATION ONLY (OUTPATIENT)
Dept: ONCOLOGY | Facility: CLINIC | Age: 84
End: 2019-12-03

## 2019-12-03 ENCOUNTER — ONCOLOGY VISIT (OUTPATIENT)
Dept: ONCOLOGY | Facility: CLINIC | Age: 84
End: 2019-12-03
Attending: INTERNAL MEDICINE
Payer: MEDICARE

## 2019-12-03 ENCOUNTER — INFUSION THERAPY VISIT (OUTPATIENT)
Dept: INFUSION THERAPY | Facility: CLINIC | Age: 84
End: 2019-12-03
Attending: INTERNAL MEDICINE
Payer: MEDICARE

## 2019-12-03 ENCOUNTER — HOSPITAL ENCOUNTER (OUTPATIENT)
Facility: CLINIC | Age: 84
Setting detail: SPECIMEN
Discharge: HOME | End: 2019-12-03
Attending: INTERNAL MEDICINE | Admitting: INTERNAL MEDICINE
Payer: MEDICARE

## 2019-12-03 VITALS
HEART RATE: 77 BPM | RESPIRATION RATE: 16 BRPM | BODY MASS INDEX: 27.6 KG/M2 | TEMPERATURE: 98.6 F | DIASTOLIC BLOOD PRESSURE: 62 MMHG | SYSTOLIC BLOOD PRESSURE: 123 MMHG | HEIGHT: 62 IN | OXYGEN SATURATION: 97 % | WEIGHT: 150 LBS

## 2019-12-03 DIAGNOSIS — C90.00 MULTIPLE MYELOMA NOT HAVING ACHIEVED REMISSION (H): Primary | ICD-10-CM

## 2019-12-03 DIAGNOSIS — R05.9 COUGH: ICD-10-CM

## 2019-12-03 DIAGNOSIS — C90.00 MULTIPLE MYELOMA NOT HAVING ACHIEVED REMISSION (H): ICD-10-CM

## 2019-12-03 DIAGNOSIS — R53.83 FATIGUE, UNSPECIFIED TYPE: ICD-10-CM

## 2019-12-03 DIAGNOSIS — N32.81 OAB (OVERACTIVE BLADDER): ICD-10-CM

## 2019-12-03 DIAGNOSIS — R06.02 SHORTNESS OF BREATH: ICD-10-CM

## 2019-12-03 PROBLEM — E87.6 HYPOKALEMIA: Status: RESOLVED | Noted: 2017-11-06 | Resolved: 2019-12-03

## 2019-12-03 LAB
ALBUMIN SERPL-MCNC: 3.5 G/DL (ref 3.4–5)
ALP SERPL-CCNC: 79 U/L (ref 40–150)
ALT SERPL W P-5'-P-CCNC: 19 U/L (ref 0–50)
ANION GAP SERPL CALCULATED.3IONS-SCNC: 5 MMOL/L (ref 3–14)
AST SERPL W P-5'-P-CCNC: 9 U/L (ref 0–45)
BASOPHILS # BLD AUTO: 0.1 10E9/L (ref 0–0.2)
BASOPHILS NFR BLD AUTO: 1 %
BILIRUB SERPL-MCNC: 0.7 MG/DL (ref 0.2–1.3)
BUN SERPL-MCNC: 29 MG/DL (ref 7–30)
CALCIUM SERPL-MCNC: 8.7 MG/DL (ref 8.5–10.1)
CHLORIDE SERPL-SCNC: 109 MMOL/L (ref 94–109)
CO2 SERPL-SCNC: 27 MMOL/L (ref 20–32)
CREAT SERPL-MCNC: 0.96 MG/DL (ref 0.52–1.04)
DIFFERENTIAL METHOD BLD: NORMAL
EOSINOPHIL # BLD AUTO: 0.1 10E9/L (ref 0–0.7)
EOSINOPHIL NFR BLD AUTO: 1 %
ERYTHROCYTE [DISTWIDTH] IN BLOOD BY AUTOMATED COUNT: 12.9 % (ref 10–15)
GFR SERPL CREATININE-BSD FRML MDRD: 54 ML/MIN/{1.73_M2}
GLUCOSE SERPL-MCNC: 89 MG/DL (ref 70–99)
HCT VFR BLD AUTO: 40.4 % (ref 35–47)
HGB BLD-MCNC: 13.5 G/DL (ref 11.7–15.7)
LYMPHOCYTES # BLD AUTO: 1.5 10E9/L (ref 0.8–5.3)
LYMPHOCYTES NFR BLD AUTO: 21 %
MCH RBC QN AUTO: 30.3 PG (ref 26.5–33)
MCHC RBC AUTO-ENTMCNC: 33.4 G/DL (ref 31.5–36.5)
MCV RBC AUTO: 91 FL (ref 78–100)
MONOCYTES # BLD AUTO: 1 10E9/L (ref 0–1.3)
MONOCYTES NFR BLD AUTO: 14 %
NEUTROPHILS # BLD AUTO: 4.5 10E9/L (ref 1.6–8.3)
NEUTROPHILS NFR BLD AUTO: 63 %
PLATELET # BLD AUTO: 172 10E9/L (ref 150–450)
POTASSIUM SERPL-SCNC: 3.9 MMOL/L (ref 3.4–5.3)
PROT SERPL-MCNC: 6.9 G/DL (ref 6.8–8.8)
RBC # BLD AUTO: 4.45 10E12/L (ref 3.8–5.2)
SODIUM SERPL-SCNC: 141 MMOL/L (ref 133–144)
WBC # BLD AUTO: 7.2 10E9/L (ref 4–11)

## 2019-12-03 PROCEDURE — 99214 OFFICE O/P EST MOD 30 MIN: CPT | Performed by: INTERNAL MEDICINE

## 2019-12-03 PROCEDURE — G0463 HOSPITAL OUTPT CLINIC VISIT: HCPCS

## 2019-12-03 PROCEDURE — 36415 COLL VENOUS BLD VENIPUNCTURE: CPT

## 2019-12-03 PROCEDURE — 85025 COMPLETE CBC W/AUTO DIFF WBC: CPT | Performed by: INTERNAL MEDICINE

## 2019-12-03 PROCEDURE — 80053 COMPREHEN METABOLIC PANEL: CPT | Performed by: INTERNAL MEDICINE

## 2019-12-03 RX ORDER — PROCHLORPERAZINE MALEATE 10 MG
10 TABLET ORAL EVERY 6 HOURS PRN
Qty: 30 TABLET | Refills: 6 | Status: SHIPPED | OUTPATIENT
Start: 2019-12-03 | End: 2020-07-15

## 2019-12-03 RX ORDER — LORAZEPAM 0.5 MG/1
0.5 TABLET ORAL EVERY 8 HOURS PRN
Qty: 30 TABLET | Refills: 3 | Status: ON HOLD | COMMUNITY
Start: 2019-12-03 | End: 2020-05-25

## 2019-12-03 RX ORDER — ACYCLOVIR 400 MG/1
400 TABLET ORAL 2 TIMES DAILY
Qty: 60 TABLET | Refills: 5 | Status: SHIPPED | OUTPATIENT
Start: 2019-12-03 | End: 2020-04-07

## 2019-12-03 RX ORDER — MIRABEGRON 25 MG/1
25 TABLET, FILM COATED, EXTENDED RELEASE ORAL DAILY
Qty: 90 TABLET | Refills: 3 | Status: SHIPPED | OUTPATIENT
Start: 2019-12-03 | End: 2020-05-07

## 2019-12-03 RX ORDER — DEXAMETHASONE 4 MG/1
20 TABLET ORAL WEEKLY
Qty: 20 TABLET | Refills: 0 | Status: SHIPPED | OUTPATIENT
Start: 2019-12-03 | End: 2020-01-08

## 2019-12-03 RX ORDER — ALBUTEROL SULFATE 90 UG/1
2 AEROSOL, METERED RESPIRATORY (INHALATION) EVERY 6 HOURS PRN
Qty: 8.5 G | Refills: 3 | Status: ON HOLD | OUTPATIENT
Start: 2019-12-03 | End: 2020-05-25

## 2019-12-03 ASSESSMENT — MIFFLIN-ST. JEOR: SCORE: 1088.65

## 2019-12-03 ASSESSMENT — PAIN SCALES - GENERAL: PAINLEVEL: NO PAIN (1)

## 2019-12-03 NOTE — Clinical Note
"    12/3/2019         RE: Quiana Dunaway  [address] [facility] [address]        Dear Colleague,    Thank you for referring your patient, Quiana Dunaway, to the [facility] CANCER [facility]. Please see a copy of my visit note below.    Oncology Rooming Note    December 3, 2019 3:17 PM   Quiana Dunaway is a 84 year old female who presents for:    Chief Complaint   Patient presents with     Oncology Clinic Visit     Initial Vitals: /62   Pulse 77   Temp 98.6  F (37  C)   Resp 16   Ht 1.575 m (5' 2\")   Wt 68 kg (150 lb)   SpO2 97%   BMI 27.44 kg/m    Estimated body mass index is 27.44 kg/m  as calculated from the following:    Height as of this encounter: 1.575 m (5' 2\").    Weight as of this encounter: 68 kg (150 lb). Body surface area is 1.72 meters squared.  No Pain (1) Comment: Data Unavailable   No LMP recorded. Patient is postmenopausal.  Allergies reviewed: Yes  Medications reviewed: Yes    Medications: Medication refills not needed today.  Pharmacy name entered into [employer]:    [facility] #[id number] - [place], [place] - [address] AT [facility] [address]  [facility] #[id number] - [place], [place] - [address] [facility] AT [facility] [address]    Clinical concerns:  doctor was notified.      Jacquie Mcdaniel MA              Visit Date:   12/03/2019     HEMATOLOGY HISTORY: Mrs. Dunaway is a lady with multiple myeloma (IgG kappa). High risk, t(14;16).  1. On 05/01/2017, WBC of 3.4, hemoglobin of 10.2 and platelet of 154.  2. SPEP on 07/07/2017 revealed M-spike of 1.8.  3. Bone marrow biopsy on 07/12/2017 reveals kappa monotypic plasma cell population consistent with multiple myeloma.  Bone marrow is 70% cellular.  Plasma cell is 50-60%.     -There is gain of chromosome 1, 5, 9, 15, and 17.  There is translocation 14;16.   4.  On 07/18/2017:  -M-spike of 1.9.   -Immunofixation reveals monoclonal IgG kappa.    -IgG level of 2970.  IgA of 15 and IgM of 175.    -Kappa free light chain of " 67.7.  Lambda free light chain of 1.42.  Ratio of kappa to lambda of 47.71.    -Beta 2 microglobulin of 3.4.   5. PET scan on 07/24/2017 reveals several focal areas of increased FDG uptake consistent with multiple myeloma.  There is probable epithelial cyst in tail of the pancreas.  No associated abnormal FDG activity.  Left thyroid cysts or nodules without any FDG activity.  There is a 0.3 cm left upper lobe lung nodule.   8.  Velcade, Revlimid and dexamethasone between on 08/14/2017 and 04/30/2018. Velcade stopped.   -Revlimid and dexamethasone continued.     SUBJECTIVE:  Ms. Dunaway is an 84-year-old female with high-risk IgG kappa multiple myeloma diagnosed in 2017.  She is currently on Revlimid and dexamethasone.  Overall, she is tolerating treatment well.      For the last few days, she has been having cough.  She has been seen by her primary care physician and diagnosed with bronchitis.  She is on azithromycin.      No headache.  Some dizziness.  No ear pain or sore throat.  She has a cough which is nonproductive.  No coughing of blood.  No chest pain.  No shortness of breath except when she coughs a lot.  Occasional wheezing.  No abdominal pain, nausea or vomiting.  No high-grade fever or chills.  Some fatigue.  No urinary or bowel complaints.      PHYSICAL EXAMINATION:   GENERAL:  Alert and oriented x 3.   VITAL SIGNS:  Reviewed.  ECOG PS of 2.     EYES:  No icterus.   THROAT:  No ulcer. No thrush.   NECK:  Supple. No lymphadenopathy. No thyromegaly.   AXILLAE:  No lymphadenopathy.   LUNGS:  Good air entry bilaterally.  No crackles or wheezing.   HEART:  Regular.  No murmur.   GI:  Soft.  Nontender. No mass.   EXTREMITIES:  No pedal edema.  No calf swelling or tenderness.   SKIN:  No rash.      LABORATORY DATA:  Reviewed.      ASSESSMENT:   1.  An 84-year-old female with high-risk multiple myeloma on Revlimid and dexamethasone.   2.  Acute bronchitis.   3.  Fatigue secondary to myeloma, Revlimid and her  age.        PLAN:   1.  I discussed regarding myeloma.  The patient clinically is stable.  She will continue on Revlimid and dexamethasone.  She is tolerating it well.  Side effects reviewed. I told the patient that she can take a week or 2 off from Revlimid around [place] if she wants.  She will think about it.   2.  She is on Zometa every 3 months, which will be continued.   3.  She will continue Zithromax for bronchitis.   4. I gave her a prescription of albuterol inhaler to be used as needed.  It will help with cough and wheezing.   5.  I will see her in January.  Advised her to call us with any questions or concerns.  Advised her to see her primary care physician if she has high-grade fever, chills, worsening shortness of breath or any other concerns.         YVES PEREZ MD             D: 2019   T: 2019   MT: OSIRIS      Name:     DUGLAS CASTILLO   MRN:      [id number]        Account:      [id number]   :      1935           Visit Date:   2019      Document: [id number]        Again, thank you for allowing me to participate in the care of your patient.        Sincerely,        Yves Perez MD

## 2019-12-03 NOTE — PATIENT INSTRUCTIONS
1. Continue revlimid and dexamethasone.  2. Complete antibiotics.  3. Continue zometa every 3 months.  4. Albuterol inhaler as needed.  5. Follow-up in January when she comes for zometa.

## 2019-12-03 NOTE — PROGRESS NOTES
Medical Assistant Note:  Quiana Dunaway presents today for Lab Draw.    Patient seen by provider today: Yes: Sohan.   present during visit today: Not Applicable.    Concerns: No Concerns.    Procedure:  Lab draw site: LAC, Needle type: Butterfly, Gauge: 23.    Post Assessment:  Labs drawn without difficulty: Yes.    Discharge Plan:  Departure Mode: Ambulatory.    Face to Face Time: 4 min.    Shari Schoenberger, CMA

## 2019-12-03 NOTE — PROGRESS NOTES
Visit Date:   12/03/2019     HEMATOLOGY HISTORY: Mrs. Dunaway is a lady with multiple myeloma (IgG kappa). High risk, t(14;16).  1. On 05/01/2017, WBC of 3.4, hemoglobin of 10.2 and platelet of 154.  2. SPEP on 07/07/2017 revealed M-spike of 1.8.  3. Bone marrow biopsy on 07/12/2017 reveals kappa monotypic plasma cell population consistent with multiple myeloma.  Bone marrow is 70% cellular.  Plasma cell is 50-60%.     -There is gain of chromosome 1, 5, 9, 15, and 17.  There is translocation 14;16.   4.  On 07/18/2017:  -M-spike of 1.9.   -Immunofixation reveals monoclonal IgG kappa.    -IgG level of 2970.  IgA of 15 and IgM of 175.    -Kappa free light chain of 67.7.  Lambda free light chain of 1.42.  Ratio of kappa to lambda of 47.71.    -Beta 2 microglobulin of 3.4.   5. PET scan on 07/24/2017 reveals several focal areas of increased FDG uptake consistent with multiple myeloma.  There is probable epithelial cyst in tail of the pancreas.  No associated abnormal FDG activity.  Left thyroid cysts or nodules without any FDG activity.  There is a 0.3 cm left upper lobe lung nodule.   8.  Velcade, Revlimid and dexamethasone between on 08/14/2017 and 04/30/2018. Velcade stopped.   -Revlimid and dexamethasone continued.     SUBJECTIVE:  Ms. Dunaway is an 84-year-old female with high-risk IgG kappa multiple myeloma diagnosed in 2017.  She is currently on Revlimid and dexamethasone.  Overall, she is tolerating treatment well.      For the last few days, she has been having cough.  She has been seen by her primary care physician and diagnosed with bronchitis.  She is on azithromycin.      No headache.  Some dizziness.  No ear pain or sore throat.  She has a cough which is nonproductive.  No coughing of blood.  No chest pain.  No shortness of breath except when she coughs a lot.  Occasional wheezing.  No abdominal pain, nausea or vomiting.  No high-grade fever or chills.  Some fatigue.  No urinary or bowel complaints.       PHYSICAL EXAMINATION:   GENERAL:  Alert and oriented x 3.   VITAL SIGNS:  Reviewed.  ECOG PS of 2.     EYES:  No icterus.   THROAT:  No ulcer. No thrush.   NECK:  Supple. No lymphadenopathy. No thyromegaly.   AXILLAE:  No lymphadenopathy.   LUNGS:  Good air entry bilaterally.  No crackles or wheezing.   HEART:  Regular.  No murmur.   GI:  Soft.  Nontender. No mass.   EXTREMITIES:  No pedal edema.  No calf swelling or tenderness.   SKIN:  No rash.      LABORATORY DATA:  Reviewed.      ASSESSMENT:   1.  An 84-year-old female with high-risk multiple myeloma on Revlimid and dexamethasone.   2.  Acute bronchitis.   3.  Fatigue secondary to myeloma, Revlimid and her age.        PLAN:   1.  I discussed regarding myeloma.  The patient clinically is stable.  She will continue on Revlimid and dexamethasone.  She is tolerating it well.  Side effects reviewed. I told the patient that she can take a week or 2 off from Revlimid around [place] if she wants.  She will think about it.   2.  She is on Zometa every 3 months, which will be continued.   3.  She will continue Zithromax for bronchitis.   4. I gave her a prescription of albuterol inhaler to be used as needed.  It will help with cough and wheezing.   5.  I will see her in January.  Advised her to call us with any questions or concerns.  Advised her to see her primary care physician if she has high-grade fever, chills, worsening shortness of breath or any other concerns.         YVES PEREZ MD             D: 2019   T: 2019   MT: OSIRIS      Name:     DUGLAS CASTILLO   MRN:      [id number]        Account:      [id number]   :      1935           Visit Date:   2019      Document: [id number]

## 2019-12-03 NOTE — PROGRESS NOTES
"Oncology Rooming Note    December 3, 2019 3:17 PM   Quiana Dunaway is a 84 year old female who presents for:    Chief Complaint   Patient presents with     Oncology Clinic Visit     Initial Vitals: /62   Pulse 77   Temp 98.6  F (37  C)   Resp 16   Ht 1.575 m (5' 2\")   Wt 68 kg (150 lb)   SpO2 97%   BMI 27.44 kg/m   Estimated body mass index is 27.44 kg/m  as calculated from the following:    Height as of this encounter: 1.575 m (5' 2\").    Weight as of this encounter: 68 kg (150 lb). Body surface area is 1.72 meters squared.  No Pain (1) Comment: Data Unavailable   No LMP recorded. Patient is postmenopausal.  Allergies reviewed: Yes  Medications reviewed: Yes    Medications: Medication refills not needed today.  Pharmacy name entered into [employer]:    [facility] #[id number] - [place], [place] - [address] AT [facility] [address]  [facility] #[id number] - [place], [place] - [address]    Clinical concerns:  doctor was notified.      Jacquie Mcdaniel MA            "

## 2019-12-12 DIAGNOSIS — C90.00 MULTIPLE MYELOMA NOT HAVING ACHIEVED REMISSION (H): ICD-10-CM

## 2019-12-17 RX ORDER — DEXAMETHASONE 4 MG/1
20 TABLET ORAL WEEKLY
Qty: 20 TABLET | Refills: 0 | Status: SHIPPED | OUTPATIENT
Start: 2019-12-17 | End: 2020-03-04

## 2019-12-17 RX ORDER — LENALIDOMIDE 10 MG/1
10 CAPSULE ORAL DAILY
Qty: 14 CAPSULE | Refills: 0 | Status: SHIPPED | OUTPATIENT
Start: 2019-12-17 | End: 2020-03-04

## 2019-12-18 ENCOUNTER — TELEPHONE (OUTPATIENT)
Dept: PHARMACY | Facility: CLINIC | Age: 84
End: 2019-12-18

## 2019-12-18 NOTE — TELEPHONE ENCOUNTER
Oral Chemotherapy Monitoring Program   Medication: Revlimid  Rx: 10mg PO daily on days 1 through 14 of 21 day cycle   Auth #: [id number]   Risk Category: adult female of non-child bearing potential  Routine survey questions reviewed.   Rx to be Escribed to [facility]    Terrie Bowen [facility] Oncology Pharmacy Liaison  [phone]

## 2019-12-26 ENCOUNTER — OFFICE VISIT (OUTPATIENT)
Dept: FAMILY MEDICINE | Facility: CLINIC | Age: 84
End: 2019-12-26
Payer: MEDICARE

## 2019-12-26 VITALS
BODY MASS INDEX: 27.44 KG/M2 | SYSTOLIC BLOOD PRESSURE: 142 MMHG | DIASTOLIC BLOOD PRESSURE: 86 MMHG | HEART RATE: 91 BPM | HEIGHT: 62 IN | OXYGEN SATURATION: 97 % | TEMPERATURE: 97.5 F

## 2019-12-26 DIAGNOSIS — N18.30 CKD (CHRONIC KIDNEY DISEASE) STAGE 3, GFR 30-59 ML/MIN (H): ICD-10-CM

## 2019-12-26 DIAGNOSIS — J40 BRONCHITIS: Primary | ICD-10-CM

## 2019-12-26 DIAGNOSIS — C90.00 MULTIPLE MYELOMA NOT HAVING ACHIEVED REMISSION (H): ICD-10-CM

## 2019-12-26 DIAGNOSIS — I10 BENIGN ESSENTIAL HYPERTENSION: ICD-10-CM

## 2019-12-26 PROCEDURE — 99214 OFFICE O/P EST MOD 30 MIN: CPT | Performed by: INTERNAL MEDICINE

## 2019-12-26 NOTE — PROGRESS NOTES
Subjective     Quiana Dunaway is a 84 year old female who presents to clinic today for the following health issues:    HPI   Chief Complaint   Patient presents with     RECHECK      84-year-old female with a history of multiple myeloma was treated for bronchitis at the beginning of this month and feels better.  She is here for recheck.  She continues to complain of mild fatigue associated with her drugs from multiple myeloma.  Her oncologist gave her a break from therapy over the holidays due to the fact that she has demonstrated stability.  She has no other new complaints.    Patient Active Problem List   Diagnosis     Benign essential hypertension     Multiple myeloma not having achieved remission (H)     Benzodiazepine overdose, accidental or unintentional, initial encounter     Pacemaker     ROBER (generalized anxiety disorder)     CKD (chronic kidney disease) stage 3, GFR 30-59 ml/min (H)     Past Surgical History:   Procedure Laterality Date     BONE MARROW BIOPSY, BONE SPECIMEN, NEEDLE/TROCAR N/A 7/12/2017    Procedure: BIOPSY BONE MARROW;  BONE MARROW BIOPSY ;  Surgeon: Grace Vela MD;  Location:  GI     IMPLANT PACEMAKER  03/2017    AV block       Social History     Tobacco Use     Smoking status: Never Smoker     Smokeless tobacco: Never Used   Substance Use Topics     Alcohol use: No     Alcohol/week: 0.0 standard drinks     Family History   Problem Relation Age of Onset     Unknown/Adopted Mother      Unknown/Adopted Father          Current Outpatient Medications   Medication Sig Dispense Refill     acyclovir (ZOVIRAX) 400 MG tablet Take 1 tablet (400 mg) by mouth 2 times daily Viral Prophylaxis. 60 tablet 5     albuterol (PROAIR HFA/PROVENTIL HFA/VENTOLIN HFA) 108 (90 Base) MCG/ACT inhaler Inhale 2 puffs into the lungs every 6 hours as needed for shortness of breath / dyspnea or wheezing 8.5 g 3     aspirin 81 MG chewable tablet Take 81 mg by mouth daily        azithromycin (ZITHROMAX)  250 MG tablet Two tablets first day, then one tablet daily for four days. 6 tablet 0     dexamethasone (DECADRON) 4 MG tablet Take 5 tablets (20 mg) by mouth once a week Once a week with food on Days 1,8,15. 20 tablet 0     dexamethasone (DECADRON) 4 MG tablet Take 5 tablets (20 mg) by mouth once a week Once a week with food on Days 1,8,15. 20 tablet 0     escitalopram (LEXAPRO) 5 MG tablet TAKE 1 TABLET BY MOUTH DAILY 90 tablet 0     LENalidomide (REVLIMID) 10 MG CAPS capsule Take 1 capsule (10 mg) by mouth daily for 14 days Take on Days 1 through 14 of 21-day cycle. 14 capsule 0     lisinopril (PRINIVIL/ZESTRIL) 10 MG tablet TAKE 1 TABLET BY MOUTH DAILY 90 tablet 1     Loperamide HCl (IMODIUM A-D PO) Take by mouth as needed       LORazepam (ATIVAN) 0.5 MG tablet Take 1 tablet (0.5 mg) by mouth every 8 hours as needed (Anxiety, Nausea/Vomiting or Sleep) 30 tablet 3     mirabegron (MYRBETRIQ) 25 MG 24 hr tablet Take 1 tablet (25 mg) by mouth daily 90 tablet 3     nitroglycerin (NITROSTAT) 0.4 MG sublingual tablet For chest pain place 1 tablet under the tongue every 5 minutes for 3 doses. If symptoms persist 5 minutes after 1st dose call 911. 25 tablet 0     order for DME Wheelchair. 1 Units 0     order for DME Dispense one 4 wheeled walker with hand brakes and seat 1 Units 0     prochlorperazine (COMPAZINE) 10 MG tablet Take 1 tablet (10 mg) by mouth every 6 hours as needed (Nausea/Vomiting) 30 tablet 6     Zoledronic Acid (ZOMETA IV)        LENalidomide (REVLIMID) 10 MG CAPS capsule Take 1 capsule (10 mg) by mouth daily for 14 days Take on Days 1 through 14 of 21-day cycle. 14 capsule 0     Allergies   Allergen Reactions     Levaquin [Levofloxacin] Other (See Comments)     Tingling in feet after 1 dose on 8/7/19         Reviewed and updated as needed this visit by Provider         Review of Systems   ROS COMP: Constitutional, HEENT, cardiovascular, pulmonary, gi and gu systems are negative, except as otherwise  "noted.      Objective    BP (!) 142/86 (BP Location: Right arm, Patient Position: Sitting, Cuff Size: Adult Regular)   Pulse 91   Temp 97.5  F (36.4  C) (Oral)   Ht 1.575 m (5' 2\")   SpO2 97%   Breastfeeding No   BMI 27.44 kg/m    Body mass index is 27.44 kg/m .  Physical Exam   GENERAL: healthy, alert and no distress  EYES: Eyes grossly normal to inspection, PERRL and conjunctivae and sclerae normal  HENT: ear canals and TM's normal, nose and mouth without ulcers or lesions  NECK: no adenopathy, no asymmetry, masses, or scars and thyroid normal to palpation  RESP: lungs clear to auscultation - no rales, rhonchi or wheezes  CV: Heart with regular rate and rhythm.   ABDOMEN: soft, nontender, no hepatosplenomegaly, no masses and bowel sounds normal  MS: no gross musculoskeletal defects noted, no edema  NEURO: Normal strength and tone, mentation intact and speech normal  PSYCH: mentation appears normal, affect normal/bright    Diagnostic Test Results:  Labs reviewed in Epic        Assessment & Plan     1. Bronchitis  Resolved after completing Z-Venancio    2. CKD (chronic kidney disease) stage 3, GFR 30-59 ml/min (H)  Stable    3. Multiple myeloma not having achieved remission (H)  Continue follow-up with oncology    4. Benign essential hypertension  Typically under good control, blood pressure reading a little high today in clinic, we will continue to monitor closely       BMI:   Estimated body mass index is 27.44 kg/m  as calculated from the following:    Height as of this encounter: 1.575 m (5' 2\").    Weight as of 12/3/19: 68 kg (150 lb).               Return in about 2 months (around 2/26/2020) for Blood Pressure Check.    César Chung MD  [facility]    "

## 2020-01-08 ENCOUNTER — HOSPITAL ENCOUNTER (OUTPATIENT)
Facility: CLINIC | Age: 85
Setting detail: SPECIMEN
Discharge: HOME | End: 2020-01-08
Attending: INTERNAL MEDICINE | Admitting: INTERNAL MEDICINE
Payer: MEDICARE

## 2020-01-08 ENCOUNTER — INFUSION THERAPY VISIT (OUTPATIENT)
Dept: INFUSION THERAPY | Facility: CLINIC | Age: 85
End: 2020-01-08
Attending: INTERNAL MEDICINE
Payer: MEDICARE

## 2020-01-08 ENCOUNTER — ONCOLOGY VISIT (OUTPATIENT)
Dept: ONCOLOGY | Facility: CLINIC | Age: 85
End: 2020-01-08
Attending: INTERNAL MEDICINE
Payer: MEDICARE

## 2020-01-08 ENCOUNTER — DOCUMENTATION ONLY (OUTPATIENT)
Dept: PHARMACY | Facility: CLINIC | Age: 85
End: 2020-01-08

## 2020-01-08 VITALS
OXYGEN SATURATION: 96 % | HEART RATE: 73 BPM | SYSTOLIC BLOOD PRESSURE: 116 MMHG | BODY MASS INDEX: 27.44 KG/M2 | RESPIRATION RATE: 18 BRPM | TEMPERATURE: 98.1 F | DIASTOLIC BLOOD PRESSURE: 73 MMHG | HEIGHT: 62 IN

## 2020-01-08 VITALS
HEART RATE: 73 BPM | SYSTOLIC BLOOD PRESSURE: 116 MMHG | RESPIRATION RATE: 18 BRPM | DIASTOLIC BLOOD PRESSURE: 73 MMHG | TEMPERATURE: 98.1 F | OXYGEN SATURATION: 96 %

## 2020-01-08 DIAGNOSIS — C90.00 MULTIPLE MYELOMA NOT HAVING ACHIEVED REMISSION (H): Primary | ICD-10-CM

## 2020-01-08 LAB
ALBUMIN SERPL-MCNC: 3.3 G/DL (ref 3.4–5)
ALP SERPL-CCNC: 68 U/L (ref 40–150)
ALT SERPL W P-5'-P-CCNC: 18 U/L (ref 0–50)
ANION GAP SERPL CALCULATED.3IONS-SCNC: 3 MMOL/L (ref 3–14)
AST SERPL W P-5'-P-CCNC: 7 U/L (ref 0–45)
BASOPHILS # BLD AUTO: 0 10E9/L (ref 0–0.2)
BASOPHILS NFR BLD AUTO: 0.4 %
BILIRUB SERPL-MCNC: 0.6 MG/DL (ref 0.2–1.3)
BUN SERPL-MCNC: 27 MG/DL (ref 7–30)
CALCIUM SERPL-MCNC: 8.8 MG/DL (ref 8.5–10.1)
CHLORIDE SERPL-SCNC: 109 MMOL/L (ref 94–109)
CO2 SERPL-SCNC: 31 MMOL/L (ref 20–32)
CREAT SERPL-MCNC: 0.66 MG/DL (ref 0.52–1.04)
DIFFERENTIAL METHOD BLD: NORMAL
EOSINOPHIL # BLD AUTO: 0.1 10E9/L (ref 0–0.7)
EOSINOPHIL NFR BLD AUTO: 2.4 %
ERYTHROCYTE [DISTWIDTH] IN BLOOD BY AUTOMATED COUNT: 13.6 % (ref 10–15)
GFR SERPL CREATININE-BSD FRML MDRD: 82 ML/MIN/{1.73_M2}
GLUCOSE SERPL-MCNC: 124 MG/DL (ref 70–99)
HCT VFR BLD AUTO: 37.1 % (ref 35–47)
HGB BLD-MCNC: 12.6 G/DL (ref 11.7–15.7)
IMM GRANULOCYTES # BLD: 0 10E9/L (ref 0–0.4)
IMM GRANULOCYTES NFR BLD: 0.8 %
LYMPHOCYTES # BLD AUTO: 1.7 10E9/L (ref 0.8–5.3)
LYMPHOCYTES NFR BLD AUTO: 32.8 %
MCH RBC QN AUTO: 31.2 PG (ref 26.5–33)
MCHC RBC AUTO-ENTMCNC: 34 G/DL (ref 31.5–36.5)
MCV RBC AUTO: 92 FL (ref 78–100)
MONOCYTES # BLD AUTO: 0.7 10E9/L (ref 0–1.3)
MONOCYTES NFR BLD AUTO: 14.3 %
NEUTROPHILS # BLD AUTO: 2.5 10E9/L (ref 1.6–8.3)
NEUTROPHILS NFR BLD AUTO: 49.3 %
NRBC # BLD AUTO: 0 10*3/UL
NRBC BLD AUTO-RTO: 0 /100
PLATELET # BLD AUTO: 235 10E9/L (ref 150–450)
POTASSIUM SERPL-SCNC: 3 MMOL/L (ref 3.4–5.3)
PROT SERPL-MCNC: 6 G/DL (ref 6.8–8.8)
RBC # BLD AUTO: 4.04 10E12/L (ref 3.8–5.2)
SODIUM SERPL-SCNC: 143 MMOL/L (ref 133–144)
WBC # BLD AUTO: 5.1 10E9/L (ref 4–11)

## 2020-01-08 PROCEDURE — 82784 ASSAY IGA/IGD/IGG/IGM EACH: CPT | Performed by: INTERNAL MEDICINE

## 2020-01-08 PROCEDURE — 80053 COMPREHEN METABOLIC PANEL: CPT | Performed by: INTERNAL MEDICINE

## 2020-01-08 PROCEDURE — 00000402 ZZHCL STATISTIC TOTAL PROTEIN: Performed by: INTERNAL MEDICINE

## 2020-01-08 PROCEDURE — 83883 ASSAY NEPHELOMETRY NOT SPEC: CPT | Performed by: INTERNAL MEDICINE

## 2020-01-08 PROCEDURE — G0463 HOSPITAL OUTPT CLINIC VISIT: HCPCS | Mod: 25

## 2020-01-08 PROCEDURE — 25000132 ZZH RX MED GY IP 250 OP 250 PS 637: Mod: GY | Performed by: INTERNAL MEDICINE

## 2020-01-08 PROCEDURE — 85025 COMPLETE CBC W/AUTO DIFF WBC: CPT | Performed by: INTERNAL MEDICINE

## 2020-01-08 PROCEDURE — 99213 OFFICE O/P EST LOW 20 MIN: CPT | Performed by: INTERNAL MEDICINE

## 2020-01-08 PROCEDURE — 96365 THER/PROPH/DIAG IV INF INIT: CPT

## 2020-01-08 PROCEDURE — 84165 PROTEIN E-PHORESIS SERUM: CPT | Performed by: INTERNAL MEDICINE

## 2020-01-08 PROCEDURE — G0463 HOSPITAL OUTPT CLINIC VISIT: HCPCS

## 2020-01-08 PROCEDURE — 25000128 H RX IP 250 OP 636: Performed by: INTERNAL MEDICINE

## 2020-01-08 RX ORDER — ZOLEDRONIC ACID 0.04 MG/ML
4 INJECTION, SOLUTION INTRAVENOUS ONCE
Status: COMPLETED | OUTPATIENT
Start: 2020-01-08 | End: 2020-01-08

## 2020-01-08 RX ORDER — POTASSIUM CHLORIDE 750 MG/1
40 TABLET, EXTENDED RELEASE ORAL ONCE
Status: COMPLETED | OUTPATIENT
Start: 2020-01-08 | End: 2020-01-08

## 2020-01-08 RX ADMIN — POTASSIUM CHLORIDE 40 MEQ: 750 TABLET, FILM COATED, EXTENDED RELEASE ORAL at 14:57

## 2020-01-08 RX ADMIN — ZOLEDRONIC ACID 4 MG: 0.04 INJECTION, SOLUTION INTRAVENOUS at 14:58

## 2020-01-08 ASSESSMENT — PAIN SCALES - GENERAL
PAINLEVEL: NO PAIN (0)
PAINLEVEL: NO PAIN (0)

## 2020-01-08 NOTE — LETTER
"    1/8/2020         RE: Quiana Dunaway  [address] [facility] [address]        Dear Colleague,    Thank you for referring your patient, Quiana Dunaway, to the [facility]. Please see a copy of my visit note below.    Oncology Rooming Note    January 8, 2020 2:43 PM   Quiana Dunaway is a 84 year old female who presents for:    Chief Complaint   Patient presents with     Oncology Clinic Visit     Initial Vitals: /73   Pulse 73   Temp 98.1  F (36.7  C) (Oral)   Resp 18   Ht 1.575 m (5' 2\")   SpO2 96%   BMI 27.44 kg/m    Estimated body mass index is 27.44 kg/m  as calculated from the following:    Height as of this encounter: 1.575 m (5' 2\").    Weight as of 12/3/19: 68 kg (150 lb). Body surface area is 1.72 meters squared.  No Pain (0) Comment: Data Unavailable   No LMP recorded. Patient is postmenopausal.  Allergies reviewed: Yes  Medications reviewed: Yes    Medications: Medication refills not needed today.  Pharmacy name entered into [employer]:    [facility] #[id number] - [place], [place] - [address] AT [facility] [address]  [facility] #[id number] - [place], [place] - [address]    Clinical concerns: no      Lauren Arredondo, [facility]              Visit Date:   01/08/2020      SUBJECTIVE:  Ms. Dunaway is an 84-year-old female with high-risk multiple myeloma.  She is currently on Revlimid and dexamethasone.      Overall, she is doing well.  She has mild fatigue which would go along with her age.  No headache.  No dizziness.  No chest pain.  No cough.  No shortness of breath.  No abdominal pain.  No nausea or vomiting.  Appetite has been good.  No urinary complaints.  No bowel problems.  No recent infection.      PHYSICAL EXAMINATION:   GENERAL:  The patient is alert and oriented x 3.   VITAL SIGNS:  Reviewed.  ECOG PS of 2.   The rest of systems not examined.      LABORATORY DATA:  Reviewed.  Potassium mildly low.      ASSESSMENT:   1.  An " 84-year-old female with high-risk multiple myeloma on Revlimid and dexamethasone.  She is tolerating it well and disease is stable.   2.  Hypokalemia.      PLAN:   1.  The patient is doing well.  She will continue on Revlimid and dexamethasone.  She has been tolerating it well.   2.  She is on prophylactic aspirin, acyclovir, which she will continue.   3.  She gets Zometa every 3 months, which will be continued.  No jaw or joint related symptoms.   4.  She will get oral potassium replacement.   5.  She will be seen in about 2 months' time.  Advised her to call us with any questions or concerns.         YVES PEREZ MD             D: 2020   T: 2020   MT:       Name:     DUGLAS CASTILLO   MRN:      [id number]        Account:      [id number]   :      1935           Visit Date:   2020      Document: [id number]       Again, thank you for allowing me to participate in the care of your patient.        Sincerely,        Yves Perez MD

## 2020-01-08 NOTE — PATIENT INSTRUCTIONS
1. Continue revlimid and dexamethasone.  2. Continue zometa every 3 months. Give zometa today.  3. Follow-up in 6 weeks with labs. Labs as per treatment protocol.

## 2020-01-08 NOTE — PROGRESS NOTES
Infusion Nursing Note:  Quiana ANA Dunaway presents today for labs/ Zometa.    Patient seen by provider today: Yes: Dr. Parry    Note: Patient potassium low at 3.0 today.  Potassium replaced orally per protocol per orders from Dr. Parry.  Infusion tolerated well.    Intravenous Access:  Peripheral IV placed.      Treatment Conditions:  Lab Results   Component Value Date    HGB 12.6 01/08/2020     Lab Results   Component Value Date    WBC 5.1 01/08/2020      Lab Results   Component Value Date    ANEU 2.5 01/08/2020     Lab Results   Component Value Date     01/08/2020      Lab Results   Component Value Date     01/08/2020                   Lab Results   Component Value Date    POTASSIUM 3.0 01/08/2020           No results found for: MAG         Lab Results   Component Value Date    CR 0.66 01/08/2020                   Lab Results   Component Value Date    HUMBERTO 8.8 01/08/2020                Lab Results   Component Value Date    BILITOTAL 0.6 01/08/2020           Lab Results   Component Value Date    ALBUMIN 3.3 01/08/2020                    Lab Results   Component Value Date    ALT 18 01/08/2020           Lab Results   Component Value Date    AST 7 01/08/2020       Results reviewed, labs MET treatment parameters, ok to proceed with treatment.      Post Infusion Assessment:  Patient tolerated infusion without incident.  Blood return noted pre and post infusion.  Site patent and intact, free from redness, edema or discomfort.  No evidence of extravasations.  Access discontinued per protocol.    Discharge Plan:   Patient declined prescription refills.  Discharge instructions reviewed with: Patient and Family.  Patient and/or family verbalized understanding of discharge instructions and all questions answered.  AVS to patient via [employer].  Patient will return 2/5/20 for next appointment.   Patient discharged in stable condition accompanied by: daughter.  Departure Mode: Ambulatory.    Michelle Quintana RN,  RN

## 2020-01-08 NOTE — PROGRESS NOTES
"Oncology Rooming Note    January 8, 2020 2:43 PM   Quiana Dunaway is a 84 year old female who presents for:    Chief Complaint   Patient presents with     Oncology Clinic Visit     Initial Vitals: /73   Pulse 73   Temp 98.1  F (36.7  C) (Oral)   Resp 18   Ht 1.575 m (5' 2\")   SpO2 96%   BMI 27.44 kg/m   Estimated body mass index is 27.44 kg/m  as calculated from the following:    Height as of this encounter: 1.575 m (5' 2\").    Weight as of 12/3/19: 68 kg (150 lb). Body surface area is 1.72 meters squared.  No Pain (0) Comment: Data Unavailable   No LMP recorded. Patient is postmenopausal.  Allergies reviewed: Yes  Medications reviewed: Yes    Medications: Medication refills not needed today.  Pharmacy name entered into [facility]:    [facility][id number] - [place], [place] - [address] [facility] [address]  [facility][id number] - [place], [place] - [address] [facility] [address] [facility] [address]    Clinical concerns: no      Lauren Arredondo CMA            "

## 2020-01-08 NOTE — PROGRESS NOTES
Oral Chemotherapy Monitoring Program.    Patient currently on lenalidomide therapy. 14 on 7 off.     Reviewed labs from 1/8/20. Results are concerning for low potassium. Replace per protocol.  Continue lenalidomide. Dr. Sohan potter with labs every 4 weeks with zometa despite cycles being 21 days.    Questions answered to patient's satisfaction.    Will follow up in 4 weeks with repeat labs.     Debra Ortega PharmD  January 8, 2020

## 2020-01-09 LAB
IGG SERPL-MCNC: 618 MG/DL (ref 610–1616)
KAPPA LC UR-MCNC: 9.27 MG/DL (ref 0.33–1.94)
KAPPA LC/LAMBDA SER: 13.43 {RATIO} (ref 0.26–1.65)
LAMBDA LC SERPL-MCNC: 0.69 MG/DL (ref 0.57–2.63)

## 2020-01-09 NOTE — PROGRESS NOTES
Visit Date:   01/08/2020     HEMATOLOGY HISTORY: Mrs. Dunaway is a lady with multiple myeloma (IgG kappa). High risk, t(14;16).  1. On 05/01/2017, WBC of 3.4, hemoglobin of 10.2 and platelet of 154.  2. SPEP on 07/07/2017 revealed M-spike of 1.8.  3. Bone marrow biopsy on 07/12/2017 reveals kappa monotypic plasma cell population consistent with multiple myeloma.  Bone marrow is 70% cellular.  Plasma cell is 50-60%.     -There is gain of chromosome 1, 5, 9, 15, and 17.  There is translocation 14;16.   4.  On 07/18/2017:  -M-spike of 1.9.   -Immunofixation reveals monoclonal IgG kappa.    -IgG level of 2970.  IgA of 15 and IgM of 175.    -Kappa free light chain of 67.7.  Lambda free light chain of 1.42.  Ratio of kappa to lambda of 47.71.    -Beta 2 microglobulin of 3.4.   5. PET scan on 07/24/2017 reveals several focal areas of increased FDG uptake consistent with multiple myeloma.  There is probable epithelial cyst in tail of the pancreas.  No associated abnormal FDG activity.  Left thyroid cysts or nodules without any FDG activity.  There is a 0.3 cm left upper lobe lung nodule.   8.  Velcade, Revlimid and dexamethasone between on 08/14/2017 and 04/30/2018. Velcade stopped.   -Revlimid and dexamethasone continued.     SUBJECTIVE:  Ms. Dunaway is an 84-year-old female with high-risk multiple myeloma.  She is currently on Revlimid and dexamethasone.      Overall, she is doing well.  She has mild fatigue which would go along with her age.  No headache.  No dizziness.  No chest pain.  No cough.  No shortness of breath.  No abdominal pain.  No nausea or vomiting.  Appetite has been good.  No urinary complaints.  No bowel problems.  No recent infection.      PHYSICAL EXAMINATION:   GENERAL:  The patient is alert and oriented x 3.   VITAL SIGNS:  Reviewed.  ECOG PS of 2.   The rest of systems not examined.      LABORATORY DATA:  Reviewed.  Potassium mildly low.      ASSESSMENT:   1.  An 84-year-old female with high-risk  multiple myeloma on Revlimid and dexamethasone.  She is tolerating it well and disease is stable.   2.  Hypokalemia.      PLAN:   1.  The patient is doing well.  She will continue on Revlimid and dexamethasone.  She has been tolerating it well.   2.  She is on prophylactic aspirin and acyclovir, which she will continue.   3.  She gets Zometa every 3 months, which will be continued.  No jaw or joint related symptoms.   4.  She will get oral potassium replacement.   5.  She will be seen in about 2 months' time.  Advised her to call us with any questions or concerns.         YVES PEREZ MD             D: 2020   T: 2020   MT:       Name:     DUGLAS CASTILLO   MRN:      [id number]        Account:      [id number]   :      1935           Visit Date:   2020      Document: [id number]

## 2020-01-10 DIAGNOSIS — C90.00 MULTIPLE MYELOMA NOT HAVING ACHIEVED REMISSION (H): Primary | ICD-10-CM

## 2020-01-10 LAB
ALBUMIN SERPL ELPH-MCNC: 3.5 G/DL (ref 3.7–5.1)
ALPHA1 GLOB SERPL ELPH-MCNC: 0.3 G/DL (ref 0.2–0.4)
ALPHA2 GLOB SERPL ELPH-MCNC: 0.7 G/DL (ref 0.5–0.9)
B-GLOBULIN SERPL ELPH-MCNC: 0.6 G/DL (ref 0.6–1)
GAMMA GLOB SERPL ELPH-MCNC: 0.6 G/DL (ref 0.7–1.6)
M PROTEIN SERPL ELPH-MCNC: 0.4 G/DL
PROT PATTERN SERPL ELPH-IMP: ABNORMAL

## 2020-01-10 RX ORDER — LENALIDOMIDE 10 MG/1
10 CAPSULE ORAL DAILY
Qty: 14 CAPSULE | Refills: 0 | Status: SHIPPED | OUTPATIENT
Start: 2020-01-10 | End: 2020-03-04

## 2020-01-10 RX ORDER — DEXAMETHASONE 4 MG/1
20 TABLET ORAL WEEKLY
Qty: 20 TABLET | Refills: 0 | Status: SHIPPED | OUTPATIENT
Start: 2020-01-10 | End: 2020-03-04

## 2020-01-15 DIAGNOSIS — Z95.0 CARDIAC PACEMAKER IN SITU: Primary | ICD-10-CM

## 2020-01-20 ENCOUNTER — TELEPHONE (OUTPATIENT)
Dept: PHARMACY | Facility: CLINIC | Age: 85
End: 2020-01-20

## 2020-01-20 NOTE — TELEPHONE ENCOUNTER
Oral Chemotherapy Monitoring Program   Medication: Revlimid  Rx: 10mg PO daily on days 1 through 14 of 21 day cycle   Auth #: [id number]   Obtained on: 1/20/20   Risk Category: adult female of non-child bearing potential  Routine survey questions reviewed.   Rx to be Escribed to [facility]    Terrie Bowen [facility] Oncology Pharmacy Liaison  [phone]

## 2020-01-31 DIAGNOSIS — C90.00 MULTIPLE MYELOMA NOT HAVING ACHIEVED REMISSION (H): ICD-10-CM

## 2020-02-05 ENCOUNTER — HOSPITAL ENCOUNTER (OUTPATIENT)
Facility: CLINIC | Age: 85
Setting detail: SPECIMEN
Discharge: HOME | End: 2020-02-05
Attending: INTERNAL MEDICINE | Admitting: INTERNAL MEDICINE
Payer: MEDICARE

## 2020-02-05 ENCOUNTER — INFUSION THERAPY VISIT (OUTPATIENT)
Dept: INFUSION THERAPY | Facility: CLINIC | Age: 85
End: 2020-02-05
Attending: INTERNAL MEDICINE
Payer: MEDICARE

## 2020-02-05 DIAGNOSIS — C90.00 MULTIPLE MYELOMA NOT HAVING ACHIEVED REMISSION (H): ICD-10-CM

## 2020-02-05 DIAGNOSIS — C90.00 MULTIPLE MYELOMA NOT HAVING ACHIEVED REMISSION (H): Primary | ICD-10-CM

## 2020-02-05 LAB
ALBUMIN SERPL-MCNC: 3.3 G/DL (ref 3.4–5)
ALP SERPL-CCNC: 67 U/L (ref 40–150)
ALT SERPL W P-5'-P-CCNC: 21 U/L (ref 0–50)
ANION GAP SERPL CALCULATED.3IONS-SCNC: 5 MMOL/L (ref 3–14)
AST SERPL W P-5'-P-CCNC: 16 U/L (ref 0–45)
BASOPHILS # BLD AUTO: 0 10E9/L (ref 0–0.2)
BASOPHILS NFR BLD AUTO: 0.5 %
BILIRUB SERPL-MCNC: 0.7 MG/DL (ref 0.2–1.3)
BUN SERPL-MCNC: 23 MG/DL (ref 7–30)
CALCIUM SERPL-MCNC: 8.7 MG/DL (ref 8.5–10.1)
CHLORIDE SERPL-SCNC: 108 MMOL/L (ref 94–109)
CO2 SERPL-SCNC: 29 MMOL/L (ref 20–32)
CREAT SERPL-MCNC: 0.55 MG/DL (ref 0.52–1.04)
DIFFERENTIAL METHOD BLD: NORMAL
EOSINOPHIL # BLD AUTO: 0.1 10E9/L (ref 0–0.7)
EOSINOPHIL NFR BLD AUTO: 2.3 %
ERYTHROCYTE [DISTWIDTH] IN BLOOD BY AUTOMATED COUNT: 13.9 % (ref 10–15)
GFR SERPL CREATININE-BSD FRML MDRD: 86 ML/MIN/{1.73_M2}
GLUCOSE SERPL-MCNC: 146 MG/DL (ref 70–99)
HCT VFR BLD AUTO: 38.6 % (ref 35–47)
HGB BLD-MCNC: 13 G/DL (ref 11.7–15.7)
IMM GRANULOCYTES # BLD: 0 10E9/L (ref 0–0.4)
IMM GRANULOCYTES NFR BLD: 0.7 %
LYMPHOCYTES # BLD AUTO: 2 10E9/L (ref 0.8–5.3)
LYMPHOCYTES NFR BLD AUTO: 35.6 %
MCH RBC QN AUTO: 30.7 PG (ref 26.5–33)
MCHC RBC AUTO-ENTMCNC: 33.7 G/DL (ref 31.5–36.5)
MCV RBC AUTO: 91 FL (ref 78–100)
MONOCYTES # BLD AUTO: 0.9 10E9/L (ref 0–1.3)
MONOCYTES NFR BLD AUTO: 15.9 %
NEUTROPHILS # BLD AUTO: 2.5 10E9/L (ref 1.6–8.3)
NEUTROPHILS NFR BLD AUTO: 45 %
NRBC # BLD AUTO: 0 10*3/UL
NRBC BLD AUTO-RTO: 0 /100
PLATELET # BLD AUTO: 237 10E9/L (ref 150–450)
POTASSIUM SERPL-SCNC: 3.5 MMOL/L (ref 3.4–5.3)
PROT SERPL-MCNC: 6.2 G/DL (ref 6.8–8.8)
RBC # BLD AUTO: 4.24 10E12/L (ref 3.8–5.2)
SODIUM SERPL-SCNC: 142 MMOL/L (ref 133–144)
WBC # BLD AUTO: 5.7 10E9/L (ref 4–11)

## 2020-02-05 PROCEDURE — 80053 COMPREHEN METABOLIC PANEL: CPT | Performed by: INTERNAL MEDICINE

## 2020-02-05 PROCEDURE — 36415 COLL VENOUS BLD VENIPUNCTURE: CPT

## 2020-02-05 PROCEDURE — 85025 COMPLETE CBC W/AUTO DIFF WBC: CPT | Performed by: INTERNAL MEDICINE

## 2020-02-05 RX ORDER — DEXAMETHASONE 4 MG/1
20 TABLET ORAL WEEKLY
Qty: 20 TABLET | Refills: 0 | Status: SHIPPED | OUTPATIENT
Start: 2020-02-05 | End: 2020-03-04

## 2020-02-05 RX ORDER — LENALIDOMIDE 10 MG/1
10 CAPSULE ORAL DAILY
Qty: 14 CAPSULE | Refills: 0 | Status: SHIPPED | OUTPATIENT
Start: 2020-02-05 | End: 2020-03-04

## 2020-02-05 NOTE — PROGRESS NOTES
Medical Assistant Note:  Quiana Dunaway presents today for BLOOD DRAW.    Patient seen by provider today: No.   present during visit today: Not Applicable.    Concerns: No Concerns.    Procedure:  Lab draw site: LAC, Needle type: BF, Gauge: 23.    Post Assessment:  Labs drawn without difficulty: Yes.    Discharge Plan:  Departure Mode: Ambulatory.    Face to Face Time: 5 MIN  .    Mariela Lyn, CMA

## 2020-02-06 ENCOUNTER — DOCUMENTATION ONLY (OUTPATIENT)
Dept: PHARMACY | Facility: CLINIC | Age: 85
End: 2020-02-06

## 2020-02-06 NOTE — PROGRESS NOTES
Oral Chemotherapy Monitoring Program  Lab Follow Up    Patient currently on Revlimid therapy for multiple myeloma.    Reviewed lab results from 2/5/20.    Labs:  _  Result Component Current Result Ref Range   Sodium 142 (2/5/2020) 133 - 144 mmol/L     _  Result Component Current Result Ref Range   Potassium 3.5 (2/5/2020) 3.4 - 5.3 mmol/L     _  Result Component Current Result Ref Range   Calcium 8.7 (2/5/2020) 8.5 - 10.1 mg/dL     No results found for Mag within last 30 days.     No results found for Phos within last 30 days.     _  Result Component Current Result Ref Range   Albumin 3.3 (L) (2/5/2020) 3.4 - 5.0 g/dL     _  Result Component Current Result Ref Range   Urea Nitrogen 23 (2/5/2020) 7 - 30 mg/dL     _  Result Component Current Result Ref Range   Creatinine 0.55 (2/5/2020) 0.52 - 1.04 mg/dL       _  Result Component Current Result Ref Range   AST 16 (2/5/2020) 0 - 45 U/L     _  Result Component Current Result Ref Range   ALT 21 (2/5/2020) 0 - 50 U/L     _  Result Component Current Result Ref Range   Bilirubin Total 0.7 (2/5/2020) 0.2 - 1.3 mg/dL       _  Result Component Current Result Ref Range   WBC 5.7 (2/5/2020) 4.0 - 11.0 10e9/L     _  Result Component Current Result Ref Range   Hemoglobin 13.0 (2/5/2020) 11.7 - 15.7 g/dL     _  Result Component Current Result Ref Range   Platelet Count 237 (2/5/2020) 150 - 450 10e9/L     _  Result Component Current Result Ref Range   Absolute Neutrophil 2.5 (2/5/2020) 1.6 - 8.3 10e9/L       Assessment & Plan:  No concerning abnormalities. Continue plan.    Follow-Up:  3/4: Dr. Parry appointment and labs     Ezra Reese, PharmD  Hematology/Oncology Clinical Pharmacist  [facility]

## 2020-02-07 ENCOUNTER — TELEPHONE (OUTPATIENT)
Dept: PHARMACY | Facility: CLINIC | Age: 85
End: 2020-02-07

## 2020-02-07 NOTE — TELEPHONE ENCOUNTER
Oral Chemotherapy Monitoring Program   Medication: revlimid  Rx: 10mg PO daily on days 1 through 14 of 21 day cycle   Auth #: [id number] Authorization #: [id number]   Obtained on: 2/7/2020   Risk Category: adult female of non-childbearing potential  Routine survey questions reviewed.   Rx to be Escribed to [facility]    Terrie Bowen [facility] Oncology Pharmacy Liaison  [phone]

## 2020-02-08 ENCOUNTER — HEALTH MAINTENANCE LETTER (OUTPATIENT)
Age: 85
End: 2020-02-08

## 2020-02-26 ENCOUNTER — OFFICE VISIT (OUTPATIENT)
Dept: FAMILY MEDICINE | Facility: CLINIC | Age: 85
End: 2020-02-26
Payer: MEDICARE

## 2020-02-26 VITALS
HEIGHT: 62 IN | HEART RATE: 80 BPM | BODY MASS INDEX: 27.6 KG/M2 | DIASTOLIC BLOOD PRESSURE: 61 MMHG | SYSTOLIC BLOOD PRESSURE: 117 MMHG | TEMPERATURE: 98.2 F | WEIGHT: 150 LBS | OXYGEN SATURATION: 95 %

## 2020-02-26 DIAGNOSIS — I10 BENIGN ESSENTIAL HYPERTENSION: ICD-10-CM

## 2020-02-26 DIAGNOSIS — K13.79 MOUTH SORE: ICD-10-CM

## 2020-02-26 DIAGNOSIS — D69.6 THROMBOCYTOPENIA, UNSPECIFIED (H): Primary | ICD-10-CM

## 2020-02-26 DIAGNOSIS — F41.1 GAD (GENERALIZED ANXIETY DISORDER): ICD-10-CM

## 2020-02-26 DIAGNOSIS — C90.00 MULTIPLE MYELOMA NOT HAVING ACHIEVED REMISSION (H): ICD-10-CM

## 2020-02-26 DIAGNOSIS — N18.30 CKD (CHRONIC KIDNEY DISEASE) STAGE 3, GFR 30-59 ML/MIN (H): ICD-10-CM

## 2020-02-26 PROCEDURE — 99214 OFFICE O/P EST MOD 30 MIN: CPT | Performed by: INTERNAL MEDICINE

## 2020-02-26 ASSESSMENT — MIFFLIN-ST. JEOR: SCORE: 1088.65

## 2020-02-26 NOTE — PROGRESS NOTES
Subjective     Quiana Dunaway is a 84 year old female who presents to clinic today for the following health issues:    HPI   Hypertension Follow-up      Do you check your blood pressure regularly outside of the clinic? No     Are you following a low salt diet? No    Are your blood pressures ever more than 140 on the top number (systolic) OR more   than 90 on the bottom number (diastolic), for example 140/90? Yes      84-year-old female with a history of multiple myeloma, hypertension, anxiety, mild chronic kidney disease presents in follow-up for those conditions.  She feels well.  Her chemotherapy is going well with her hematology oncology physician.  Her mood has been well controlled.  Her grandchild got engaged.  She has no specific complaints.  She feels that frequent follow-up helps with her anxiety related to her chronic conditions.  She did describe a sore on her tongue that has been present for 2 days.  She cannot think of any specific diet triggers.  The symptoms are mild to moderate.    Patient Active Problem List   Diagnosis     Benign essential hypertension     Multiple myeloma not having achieved remission (H)     Benzodiazepine overdose, accidental or unintentional, initial encounter     Pacemaker     ROBER (generalized anxiety disorder)     CKD (chronic kidney disease) stage 3, GFR 30-59 ml/min (H)     Past Surgical History:   Procedure Laterality Date     BONE MARROW BIOPSY, BONE SPECIMEN, NEEDLE/TROCAR N/A 7/12/2017    Procedure: BIOPSY BONE MARROW;  BONE MARROW BIOPSY ;  Surgeon: Grace Vela MD;  Location:  GI     IMPLANT PACEMAKER  03/2017    AV block       Social History     Tobacco Use     Smoking status: Never Smoker     Smokeless tobacco: Never Used   Substance Use Topics     Alcohol use: No     Alcohol/week: 0.0 standard drinks     Family History   Problem Relation Age of Onset     Unknown/Adopted Mother      Unknown/Adopted Father          Current Outpatient Medications    Medication Sig Dispense Refill     acyclovir (ZOVIRAX) 400 MG tablet Take 1 tablet (400 mg) by mouth 2 times daily Viral Prophylaxis. 60 tablet 5     albuterol (PROAIR HFA/PROVENTIL HFA/VENTOLIN HFA) 108 (90 Base) MCG/ACT inhaler Inhale 2 puffs into the lungs every 6 hours as needed for shortness of breath / dyspnea or wheezing 8.5 g 3     aspirin 81 MG chewable tablet Take 81 mg by mouth daily        dexamethasone (DECADRON) 4 MG tablet Take 5 tablets (20 mg) by mouth once a week Once a week with food on Days 1,8,15. 20 tablet 0     dexamethasone (DECADRON) 4 MG tablet Take 5 tablets (20 mg) by mouth once a week Once a week with food on Days 1,8,15. 20 tablet 0     dexamethasone (DECADRON) 4 MG tablet Take 5 tablets (20 mg) by mouth once a week Once a week with food on Days 1,8,15. 20 tablet 0     escitalopram (LEXAPRO) 5 MG tablet TAKE 1 TABLET BY MOUTH DAILY 90 tablet 0     lisinopril (PRINIVIL/ZESTRIL) 10 MG tablet TAKE 1 TABLET BY MOUTH DAILY 90 tablet 1     Loperamide HCl (IMODIUM A-D PO) Take by mouth as needed       LORazepam (ATIVAN) 0.5 MG tablet Take 1 tablet (0.5 mg) by mouth every 8 hours as needed (Anxiety, Nausea/Vomiting or Sleep) 30 tablet 3     mirabegron (MYRBETRIQ) 25 MG 24 hr tablet Take 1 tablet (25 mg) by mouth daily 90 tablet 3     nitroglycerin (NITROSTAT) 0.4 MG sublingual tablet For chest pain place 1 tablet under the tongue every 5 minutes for 3 doses. If symptoms persist 5 minutes after 1st dose call 911. 25 tablet 0     order for DME Wheelchair. 1 Units 0     order for DME Dispense one 4 wheeled walker with hand brakes and seat 1 Units 0     prochlorperazine (COMPAZINE) 10 MG tablet Take 1 tablet (10 mg) by mouth every 6 hours as needed (Nausea/Vomiting) 30 tablet 6     Zoledronic Acid (ZOMETA IV)        LENalidomide (REVLIMID) 10 MG CAPS capsule Take 1 capsule (10 mg) by mouth daily for 14 days Take on Days 1 through 14 of 21-day cycle. 14 capsule 0     LENalidomide (REVLIMID) 10 MG  "CAPS capsule Take 1 capsule (10 mg) by mouth daily for 14 days Take on Days 1 through 14 of 21-day cycle. 14 capsule 0     LENalidomide (REVLIMID) 10 MG CAPS capsule Take 1 capsule (10 mg) by mouth daily for 14 days Take on Days 1 through 14 of 21-day cycle. 14 capsule 0     Allergies   Allergen Reactions     Levaquin [Levofloxacin] Other (See Comments)     Tingling in feet after 1 dose on 8/7/19         Reviewed and updated as needed this visit by Provider         Review of Systems   ROS COMP: Constitutional, HEENT, cardiovascular, pulmonary, gi and gu systems are negative, except as otherwise noted.      Objective    /61 (BP Location: Right arm, Patient Position: Sitting, Cuff Size: Adult Regular)   Pulse 80   Temp 98.2  F (36.8  C) (Oral)   Ht 1.575 m (5' 2\")   Wt 68 kg (150 lb)   SpO2 95%   Breastfeeding No   BMI 27.44 kg/m    Body mass index is 27.44 kg/m .  Physical Exam   GENERAL: healthy, alert and no distress  EYES: Eyes grossly normal to inspection, PERRL and conjunctivae and sclerae normal  HENT: Mild inflammatory appearing lesion on the edge of the right tongue without ulceration, the mucous membranes are moist, there is no obvious oral thrush  NECK: no adenopathy, no asymmetry, masses, or scars and thyroid normal to palpation  RESP: lungs clear to auscultation - no rales, rhonchi or wheezes  CV: Heart with regular rate and rhythm.   ABDOMEN: soft, nontender, no hepatosplenomegaly, no masses and bowel sounds normal  MS: no gross musculoskeletal defects noted, no edema  NEURO: Normal strength and tone, mentation intact and speech normal  PSYCH: mentation appears normal, affect normal/bright            Assessment & Plan       ICD-10-CM    1. Thrombocytopenia, unspecified (H) D69.6    2. CKD (chronic kidney disease) stage 3, GFR 30-59 ml/min (H) N18.3    3. Multiple myeloma not having achieved remission (H) C90.00    4. ROBER (generalized anxiety disorder) F41.1    5. Benign essential " hypertension I10    6. Mouth sore K13.79      Overall, she seems to be doing well.  Blood pressure is well controlled.  Labs are essentially stable and being checked by her hematologist.  Mood is well controlled.  The lesion on her tongue might improve with Kenalog in [place].  We discussed this.  However, she would like to avoid new medications.  We did discuss diet considerations such as avoiding citrus, acidic and spicy foods.  If this does not help enough, she will call me to phone in a prescription for the Kenalog in [place].  She is at risk for thrush given her use of steroids for her multiple myeloma.  However, on exam, this does not seem to obviously be thrush, but if other interventions do not help her mouth sore, consider empiric treatment for thrush.          Return in about 2 months (around 5/12/2020).   Or sooner as needed  César Chung MD  [facility]    Total face to face contact time was greater than 20 minutes of which more than 50% of this time was spent counseling and coordinating care regarding the above topics.

## 2020-03-03 DIAGNOSIS — C90.00 MULTIPLE MYELOMA NOT HAVING ACHIEVED REMISSION (H): Primary | ICD-10-CM

## 2020-03-03 RX ORDER — LENALIDOMIDE 10 MG/1
10 CAPSULE ORAL DAILY
Qty: 14 CAPSULE | Refills: 0 | Status: SHIPPED | OUTPATIENT
Start: 2020-03-03 | End: 2020-05-07

## 2020-03-03 RX ORDER — DEXAMETHASONE 4 MG/1
20 TABLET ORAL WEEKLY
Qty: 15 TABLET | Refills: 0 | Status: SHIPPED | OUTPATIENT
Start: 2020-03-03 | End: 2020-05-07

## 2020-03-04 ENCOUNTER — ONCOLOGY VISIT (OUTPATIENT)
Dept: ONCOLOGY | Facility: CLINIC | Age: 85
End: 2020-03-04
Attending: INTERNAL MEDICINE
Payer: MEDICARE

## 2020-03-04 ENCOUNTER — TELEPHONE (OUTPATIENT)
Dept: PHARMACY | Facility: CLINIC | Age: 85
End: 2020-03-04

## 2020-03-04 ENCOUNTER — HOSPITAL ENCOUNTER (OUTPATIENT)
Facility: CLINIC | Age: 85
Setting detail: SPECIMEN
Discharge: HOME | End: 2020-03-04
Attending: INTERNAL MEDICINE | Admitting: INTERNAL MEDICINE
Payer: MEDICARE

## 2020-03-04 ENCOUNTER — INFUSION THERAPY VISIT (OUTPATIENT)
Dept: INFUSION THERAPY | Facility: CLINIC | Age: 85
End: 2020-03-04
Attending: INTERNAL MEDICINE
Payer: MEDICARE

## 2020-03-04 DIAGNOSIS — C90.00 MULTIPLE MYELOMA NOT HAVING ACHIEVED REMISSION (H): ICD-10-CM

## 2020-03-04 DIAGNOSIS — C90.00 MULTIPLE MYELOMA NOT HAVING ACHIEVED REMISSION (H): Primary | ICD-10-CM

## 2020-03-04 PROBLEM — D69.6 THROMBOCYTOPENIA, UNSPECIFIED (H): Status: RESOLVED | Noted: 2020-02-26 | Resolved: 2020-03-04

## 2020-03-04 PROBLEM — T42.4X1A BENZODIAZEPINE OVERDOSE, ACCIDENTAL OR UNINTENTIONAL, INITIAL ENCOUNTER: Status: RESOLVED | Noted: 2017-10-09 | Resolved: 2020-03-04

## 2020-03-04 LAB
ALBUMIN SERPL-MCNC: 3.2 G/DL (ref 3.4–5)
ALP SERPL-CCNC: 87 U/L (ref 40–150)
ALT SERPL W P-5'-P-CCNC: 23 U/L (ref 0–50)
ANION GAP SERPL CALCULATED.3IONS-SCNC: 2 MMOL/L (ref 3–14)
AST SERPL W P-5'-P-CCNC: 14 U/L (ref 0–45)
BASOPHILS # BLD AUTO: 0 10E9/L (ref 0–0.2)
BASOPHILS NFR BLD AUTO: 0.4 %
BILIRUB SERPL-MCNC: 0.5 MG/DL (ref 0.2–1.3)
BUN SERPL-MCNC: 24 MG/DL (ref 7–30)
CALCIUM SERPL-MCNC: 8.1 MG/DL (ref 8.5–10.1)
CHLORIDE SERPL-SCNC: 112 MMOL/L (ref 94–109)
CO2 SERPL-SCNC: 28 MMOL/L (ref 20–32)
CREAT SERPL-MCNC: 0.64 MG/DL (ref 0.52–1.04)
DIFFERENTIAL METHOD BLD: NORMAL
EOSINOPHIL # BLD AUTO: 0.1 10E9/L (ref 0–0.7)
EOSINOPHIL NFR BLD AUTO: 2.2 %
ERYTHROCYTE [DISTWIDTH] IN BLOOD BY AUTOMATED COUNT: 13.6 % (ref 10–15)
GFR SERPL CREATININE-BSD FRML MDRD: 82 ML/MIN/{1.73_M2}
GLUCOSE SERPL-MCNC: 123 MG/DL (ref 70–99)
HCT VFR BLD AUTO: 37.5 % (ref 35–47)
HGB BLD-MCNC: 12.7 G/DL (ref 11.7–15.7)
IMM GRANULOCYTES # BLD: 0 10E9/L (ref 0–0.4)
IMM GRANULOCYTES NFR BLD: 0.2 %
LYMPHOCYTES # BLD AUTO: 1.9 10E9/L (ref 0.8–5.3)
LYMPHOCYTES NFR BLD AUTO: 34.1 %
MCH RBC QN AUTO: 30.5 PG (ref 26.5–33)
MCHC RBC AUTO-ENTMCNC: 33.9 G/DL (ref 31.5–36.5)
MCV RBC AUTO: 90 FL (ref 78–100)
MONOCYTES # BLD AUTO: 1 10E9/L (ref 0–1.3)
MONOCYTES NFR BLD AUTO: 17.7 %
NEUTROPHILS # BLD AUTO: 2.5 10E9/L (ref 1.6–8.3)
NEUTROPHILS NFR BLD AUTO: 45.4 %
NRBC # BLD AUTO: 0 10*3/UL
NRBC BLD AUTO-RTO: 0 /100
PLATELET # BLD AUTO: 171 10E9/L (ref 150–450)
POTASSIUM SERPL-SCNC: 3.5 MMOL/L (ref 3.4–5.3)
PROT SERPL-MCNC: 6.1 G/DL (ref 6.8–8.8)
RBC # BLD AUTO: 4.17 10E12/L (ref 3.8–5.2)
SODIUM SERPL-SCNC: 142 MMOL/L (ref 133–144)
WBC # BLD AUTO: 5.8 10E9/L (ref 4–11)

## 2020-03-04 PROCEDURE — 85025 COMPLETE CBC W/AUTO DIFF WBC: CPT | Performed by: INTERNAL MEDICINE

## 2020-03-04 PROCEDURE — 99214 OFFICE O/P EST MOD 30 MIN: CPT | Performed by: INTERNAL MEDICINE

## 2020-03-04 PROCEDURE — G0463 HOSPITAL OUTPT CLINIC VISIT: HCPCS

## 2020-03-04 PROCEDURE — 36415 COLL VENOUS BLD VENIPUNCTURE: CPT

## 2020-03-04 PROCEDURE — 80053 COMPREHEN METABOLIC PANEL: CPT | Performed by: INTERNAL MEDICINE

## 2020-03-04 RX ORDER — ZOLEDRONIC ACID 0.04 MG/ML
4 INJECTION, SOLUTION INTRAVENOUS ONCE
Status: CANCELLED | OUTPATIENT
Start: 2020-04-01

## 2020-03-04 NOTE — PROGRESS NOTES
Medical Assistant Note:  Quiana Dunaway presents today for BLOOD DRAW.    Patient seen by provider today: Yes: CHRIS.   present during visit today: Not Applicable.    Concerns: No Concerns.    Procedure:  Lab draw site: LAC, Needle type: BF, Gauge: 23.    Post Assessment:  Labs drawn without difficulty: No.    Discharge Plan:  Departure Mode: Ambulatory.    Face to Face Time: 5 MIN  .    Mariela Lyn, CMA

## 2020-03-04 NOTE — TELEPHONE ENCOUNTER
Oral Chemotherapy Monitoring Program   Medication: Revlimid  Rx: 10mg PO daily on days 1 through 14 of 21 day cycle   Auth #: [id number]   Risk Category: adult female of non child bearing potential  Routine survey questions reviewed.   Rx [facility] be Escribed to [facility]    Terrie Bowen [facility] Oncology Pharmacy Liaison  [phone]

## 2020-03-04 NOTE — PATIENT INSTRUCTIONS
1. Continue revlimid and dexamethasone. Reduce dexamethasone to 8 mg every week.  2. Zometa every 3 months.  3. Draw myeloma labs when she comes for zometa. Lab order in treatment plan for revlimid.  4. See Juaquin Hammond when she is here for zometa.  5. See me in 2 month. *Pt will call to schedule return, daughter-in-law needs to check schedule/Marjan

## 2020-03-04 NOTE — LETTER
3/4/2020         RE: Quiana Dunaway  [address] [facility] [address]        Dear Colleague,    Thank you for referring your patient, Quiana Dunaway, to the [facility]. Please see a copy of my visit note below.    Visit Date:   03/04/2020     HEMATOLOGY HISTORY: Mrs. Dunaway is a lady with multiple myeloma (IgG kappa). High risk, t(14;16).  1. On 05/01/2017, WBC of 3.4, hemoglobin of 10.2 and platelet of 154.  2. SPEP on 07/07/2017 revealed M-spike of 1.8.  3. Bone marrow biopsy on 07/12/2017 reveals kappa monotypic plasma cell population consistent with multiple myeloma.  Bone marrow is 70% cellular.  Plasma cell is 50-60%.     -There is gain of chromosome 1, 5, 9, 15, and 17.  There is translocation 14;16.   4.  On 07/18/2017:  -M-spike of 1.9.   -Immunofixation reveals monoclonal IgG kappa.    -IgG level of 2970.  IgA of 15 and IgM of 175.    -Kappa free light chain of 67.7.  Lambda free light chain of 1.42.  Ratio of kappa to lambda of 47.71.    -Beta 2 microglobulin of 3.4.   5. PET scan on 07/24/2017 reveals several focal areas of increased FDG uptake consistent with multiple myeloma.  There is probable epithelial cyst in tail of the pancreas.  No associated abnormal FDG activity.  Left thyroid cysts or nodules without any FDG activity.  There is a 0.3 cm left upper lobe lung nodule.   8.  Velcade, Revlimid and dexamethasone between on 08/14/2017 and 04/30/2018. Velcade stopped.   -Revlimid and dexamethasone continued.     SUBJECTIVE:  Ms. Dunaway is an 84-year-old female with high-risk multiple myeloma.  She is currently on Revlimid and dexamethasone.  Overall, she is doing.      Her overall condition has been stable.  The patient has an umbilical hernia.  Sometimes it gets bigger and causes mild discomfort.      She has some fatigue.  No headache.  No dizziness.  No visual problem.  Mouth has been dry.  The patient thinks that sometimes her speech is not clear.  Daughter was  there.  As per the daughter, her speech has not changed.  There is no slurring.  No chest pain.  No shortness of breath.  No abdominal pain, nausea or vomiting.  Appetite is good.  No urinary or bowel complaints.  No bleeding.  Overall, her condition is stable.      PHYSICAL EXAMINATION:   GENERAL:  Alert and oriented x 3.   VITAL SIGNS:  Reviewed.  ECOG PS of 2.     EYES:  No icterus.   THROAT:  No ulcer. No thrush.   NECK:  Supple. No lymphadenopathy. No thyromegaly.   AXILLAE:  No lymphadenopathy.   LUNGS:  Good air entry bilaterally.  No crackles or wheezing.   HEART:  Regular.  No murmur.   GI: Abdomen is soft.  Nontender. No mass. Difficult palpation because of her weight.  EXTREMITIES:  No pedal edema.  No calf swelling or tenderness.   SKIN:  No rash.      LABORATORY DATA:  Reviewed.      ASSESSMENT:   1.  An 84-year-old female with high-risk multiple myeloma which is stable.   2.  Other medical problems including hypertension.      PLAN:   1.  The patient clinically is stable.  She does not have any new symptoms.  Discussed regarding myeloma.  Overall, she is doing good.  She will continue on Revlimid and dexamethasone.  I will decrease the dexamethasone dose to 8 mg weekly.  Plan is to subsequently decrease it to 4 mg weekly.  She is agreeable for it.   2.  Labs were reviewed.  CBC is normal.  CMP reveals low protein.  With next lab will recheck SPEP, free light chain, and IgG level.     3.  She gets Zometa every 3 months, which will be continued.  She is tolerating it well.  No jaw related or dental related symptoms.   4.  I will see her in 2 months.  In between, she will see our nurse practitioner.         YVES PEREZ MD             D: 2020   T: 2020   MT: EMANUEL      Name:     DUGLAS CASTILLO   MRN:      [id number]        Account:      [id number]   :      1935           Visit Date:   2020      Document: [id number]      Visit Date:   2020     HEMATOLOGY HISTORY: Mrs.  Emelyn is a lady with multiple myeloma (IgG kappa). High risk, t(14;16).  1. On 05/01/2017, WBC of 3.4, hemoglobin of 10.2 and platelet of 154.  2. SPEP on 07/07/2017 revealed M-spike of 1.8.  3. Bone marrow biopsy on 07/12/2017 reveals kappa monotypic plasma cell population consistent with multiple myeloma.  Bone marrow is 70% cellular.  Plasma cell is 50-60%.     -There is gain of chromosome 1, 5, 9, 15, and 17.  There is translocation 14;16.   4.  On 07/18/2017:  -M-spike of 1.9.   -Immunofixation reveals monoclonal IgG kappa.    -IgG level of 2970.  IgA of 15 and IgM of 175.    -Kappa free light chain of 67.7.  Lambda free light chain of 1.42.  Ratio of kappa to lambda of 47.71.    -Beta 2 microglobulin of 3.4.   5. PET scan on 07/24/2017 reveals several focal areas of increased FDG uptake consistent with multiple myeloma.  There is probable epithelial cyst in tail of the pancreas.  No associated abnormal FDG activity.  Left thyroid cysts or nodules without any FDG activity.  There is a 0.3 cm left upper lobe lung nodule.   8.  Velcade, Revlimid and dexamethasone between on 08/14/2017 and 04/30/2018. Velcade stopped.   -Revlimid and dexamethasone continued.     SUBJECTIVE:  Ms. Dunaway is an 84-year-old female with high-risk multiple myeloma.  She is currently on Revlimid and dexamethasone.  Overall, she is doing.      Her overall condition has been stable.  The patient has an umbilical hernia.  Sometimes it gets bigger and causes mild discomfort.      She has some fatigue.  No headache.  No dizziness.  No visual problem.  Mouth has been dry.  The patient thinks that sometimes her speech is not clear.  Daughter was there.  As per the daughter, her speech has not changed.  There is no slurring.  No chest pain.  No shortness of breath.  No abdominal pain, nausea or vomiting.  Appetite is good.  No urinary or bowel complaints.  No bleeding.  Overall, her condition is stable.      PHYSICAL EXAMINATION:   GENERAL:   Alert and oriented x 3.   VITAL SIGNS:  Reviewed.  ECOG PS of 2.     EYES:  No icterus.   THROAT:  No ulcer. No thrush.   NECK:  Supple. No lymphadenopathy. No thyromegaly.   AXILLAE:  No lymphadenopathy.   LUNGS:  Good air entry bilaterally.  No crackles or wheezing.   HEART:  Regular.  No murmur.   GI: Abdomen is soft.  Nontender. No mass. Difficult palpation because of her weight.  EXTREMITIES:  No pedal edema.  No calf swelling or tenderness.   SKIN:  No rash.      LABORATORY DATA:  Reviewed.      ASSESSMENT:   1.  An 84-year-old female with high-risk multiple myeloma which is stable.   2.  Other medical problems including hypertension.      PLAN:   1.  The patient clinically is stable.  She does not have any new symptoms.  Discussed regarding myeloma.  Overall, she is doing good.  She will continue on Revlimid and dexamethasone.  I will decrease the dexamethasone dose to 8 mg weekly.  Plan is to subsequently decrease it to 4 mg weekly.  She is agreeable for it.   2.  Labs were reviewed.  CBC is normal.  CMP reveals low protein.  With next lab will recheck SPEP, free light chain, and IgG level.     3.  She gets Zometa every 3 months, which will be continued.  She is tolerating it well.  No jaw related or dental related symptoms.   4.  I will see her in 2 months.  In between, she will see our nurse practitioner.         YVES PEREZ MD             D: 2020   T: 2020   MT: EMANUEL      Name:     DUGLAS CASTILLO   MRN:      [id number]        Account:      [id number]   :      1935           Visit Date:   2020      Document: [id number]          Again, thank you for allowing me to participate in the care of your patient.        Sincerely,        Yves Perez MD

## 2020-03-05 ENCOUNTER — DOCUMENTATION ONLY (OUTPATIENT)
Dept: PHARMACY | Facility: CLINIC | Age: 85
End: 2020-03-05

## 2020-03-05 ENCOUNTER — ANCILLARY PROCEDURE (OUTPATIENT)
Dept: CARDIOLOGY | Facility: CLINIC | Age: 85
End: 2020-03-05
Attending: INTERNAL MEDICINE
Payer: MEDICARE

## 2020-03-05 DIAGNOSIS — Z95.0 PACEMAKER: ICD-10-CM

## 2020-03-05 NOTE — PROGRESS NOTES
"Oral Chemotherapy Monitoring Program  Lab Follow Up    Patient currently on Revlimid therapy for multiple myeloma.       Reviewed lab results from 3/4/20.     Labs:  _  Result Component Current Result Ref Range   Sodium 142 (3/4/2020) 133 - 144 mmol/L     _  Result Component Current Result Ref Range   Potassium 3.5 (3/4/2020) 3.4 - 5.3 mmol/L     _  Result Component Current Result Ref Range   Calcium 8.1 (L) (3/4/2020) 8.5 - 10.1 mg/dL     No results found for Mag within last 30 days.     No results found for Phos within last 30 days.     _  Result Component Current Result Ref Range   Albumin 3.2 (L) (3/4/2020) 3.4 - 5.0 g/dL     _  Result Component Current Result Ref Range   Urea Nitrogen 24 (3/4/2020) 7 - 30 mg/dL     _  Result Component Current Result Ref Range   Creatinine 0.64 (3/4/2020) 0.52 - 1.04 mg/dL       _  Result Component Current Result Ref Range   AST 14 (3/4/2020) 0 - 45 U/L     _  Result Component Current Result Ref Range   ALT 23 (3/4/2020) 0 - 50 U/L     _  Result Component Current Result Ref Range   Bilirubin Total 0.5 (3/4/2020) 0.2 - 1.3 mg/dL       _  Result Component Current Result Ref Range   WBC 5.8 (3/4/2020) 4.0 - 11.0 10e9/L     _  Result Component Current Result Ref Range   Hemoglobin 12.7 (3/4/2020) 11.7 - 15.7 g/dL     _  Result Component Current Result Ref Range   Platelet Count 171 (3/4/2020) 150 - 450 10e9/L     _  Result Component Current Result Ref Range   Absolute Neutrophil 2.5 (3/4/2020) 1.6 - 8.3 10e9/L       Assessment & Plan:  No concerning abnormalities. Per Dr. Parry 3/4 note: \"Continue Revlimid and dexamethasone. Reduce dexamethasone to 8 mg every week.\"     Follow-Up:  4/1: Clinic appointment and labs      Ezra Reese, PharmD  Hematology/Oncology Clinical Pharmacist  [facility]       "

## 2020-03-05 NOTE — PROGRESS NOTES
Visit Date:   03/04/2020     HEMATOLOGY HISTORY: Mrs. Dunaway is a lady with multiple myeloma (IgG kappa). High risk, t(14;16).  1. On 05/01/2017, WBC of 3.4, hemoglobin of 10.2 and platelet of 154.  2. SPEP on 07/07/2017 revealed M-spike of 1.8.  3. Bone marrow biopsy on 07/12/2017 reveals kappa monotypic plasma cell population consistent with multiple myeloma.  Bone marrow is 70% cellular.  Plasma cell is 50-60%.     -There is gain of chromosome 1, 5, 9, 15, and 17.  There is translocation 14;16.   4.  On 07/18/2017:  -M-spike of 1.9.   -Immunofixation reveals monoclonal IgG kappa.    -IgG level of 2970.  IgA of 15 and IgM of 175.    -Kappa free light chain of 67.7.  Lambda free light chain of 1.42.  Ratio of kappa to lambda of 47.71.    -Beta 2 microglobulin of 3.4.   5. PET scan on 07/24/2017 reveals several focal areas of increased FDG uptake consistent with multiple myeloma.  There is probable epithelial cyst in tail of the pancreas.  No associated abnormal FDG activity.  Left thyroid cysts or nodules without any FDG activity.  There is a 0.3 cm left upper lobe lung nodule.   8.  Velcade, Revlimid and dexamethasone between on 08/14/2017 and 04/30/2018. Velcade stopped.   -Revlimid and dexamethasone continued.     SUBJECTIVE:  Ms. Dunaway is an 84-year-old female with high-risk multiple myeloma.  She is currently on Revlimid and dexamethasone.  Overall, she is doing.      Her overall condition has been stable.  The patient has an umbilical hernia.  Sometimes it gets bigger and causes mild discomfort.      She has some fatigue.  No headache.  No dizziness.  No visual problem.  Mouth has been dry.  The patient thinks that sometimes her speech is not clear.  Daughter was there.  As per the daughter, her speech has not changed.  There is no slurring.  No chest pain.  No shortness of breath.  No abdominal pain, nausea or vomiting.  Appetite is good.  No urinary or bowel complaints.  No bleeding.  Overall, her  condition is stable.      PHYSICAL EXAMINATION:   GENERAL:  Alert and oriented x 3.   VITAL SIGNS:  Reviewed.  ECOG PS of 2.     EYES:  No icterus.   THROAT:  No ulcer. No thrush.   NECK:  Supple. No lymphadenopathy. No thyromegaly.   AXILLAE:  No lymphadenopathy.   LUNGS:  Good air entry bilaterally.  No crackles or wheezing.   HEART:  Regular.  No murmur.   GI: Abdomen is soft.  Nontender. No mass. Difficult palpation because of her weight.  EXTREMITIES:  No pedal edema.  No calf swelling or tenderness.   SKIN:  No rash.      LABORATORY DATA:  Reviewed.      ASSESSMENT:   1.  An 84-year-old female with high-risk multiple myeloma which is stable.   2.  Other medical problems including hypertension.      PLAN:   1.  The patient clinically is stable.  She does not have any new symptoms.  Discussed regarding myeloma.  Overall, she is doing good.  She will continue on Revlimid and dexamethasone.  I will decrease the dexamethasone dose to 8 mg weekly.  Plan is to subsequently decrease it to 4 mg weekly.  She is agreeable for it.   2.  Labs were reviewed.  CBC is normal.  CMP reveals low protein.  With next lab will recheck SPEP, free light chain, and IgG level.     3.  She gets Zometa every 3 months, which will be continued.  She is tolerating it well.  No jaw related or dental related symptoms.   4.  I will see her in 2 months.  In between, she will see our nurse practitioner.         YVES PEREZ MD             D: 2020   T: 2020   MT: EMANUEL      Name:     DUGLAS CASTILLO   MRN:      [id number]        Account:      [id number]   :      1935           Visit Date:   2020      Document: [id number]

## 2020-03-06 DIAGNOSIS — I10 BENIGN ESSENTIAL HYPERTENSION: ICD-10-CM

## 2020-03-06 RX ORDER — LISINOPRIL 10 MG/1
TABLET ORAL
Qty: 90 TABLET | Refills: 3 | Status: SHIPPED | OUTPATIENT
Start: 2020-03-06 | End: 2020-08-26

## 2020-03-06 NOTE — TELEPHONE ENCOUNTER
"Last Written Prescription Date:  9/11/19  Last Fill Quantity: 90 tablet,  # refills: 1   Last office visit: 2/26/2020 with prescribing provider:  Juliet   Future Office Visit:   Next 5 appointments (look out 90 days)    Apr 01, 2020  2:00 PM CDT  Return Visit with MARIUSZ Mckeon CNP  [facility] ([facility]) [address], [address]  [facility] [address]  [phone]   May 07, 2020  1:30 PM CDT  Return Visit with  LAB DRAW 1  [facility] ([facility]) [facility]  [address]  [facility] [address] [phone]   May 21, 2020  1:30 PM CDT  Office Visit with César Chung MD  [facility] ([facility]) [address] [facility] [address] [phone]           Requested Prescriptions   Pending Prescriptions Disp Refills     lisinopril (ZESTRIL) 10 MG tablet [Pharmacy Med Name: LISINOPRIL 10MG TABLETS] 90 tablet 1     Sig: TAKE 1 TABLET BY MOUTH DAILY       ACE Inhibitors (Including Combos) Protocol Passed - 3/6/2020  3:57 AM        Passed - Blood pressure under 140/90 in past 12 months     BP Readings from Last 3 Encounters:   02/26/20 117/61   01/08/20 116/73   01/08/20 116/73                 Passed - Recent (12 mo) or future (30 days) visit within the authorizing provider's specialty     Patient has had an office visit with the authorizing provider or a provider within the authorizing providers department within the previous 12 mos or has a future within next 30 days. See \"Patient Info\" tab in inbasket, or \"Choose Columns\" in Meds & Orders section of the refill encounter.              Passed - Medication is active on med list        Passed - Patient is age 18 or older        Passed - No active pregnancy on record        Passed - Normal serum creatinine on file in past 12 months     Recent Labs   Lab Test 03/04/20  1329  07/24/17  0827   CR " 0.64   < >  --    CREAT  --   --  0.7    < > = values in this interval not displayed.             Passed - Normal serum potassium on file in past 12 months     Recent Labs   Lab Test 03/04/20  1329   POTASSIUM 3.5             Passed - No positive pregnancy test within past 12 months

## 2020-03-08 NOTE — PROGRESS NOTES
Visit Date:   03/04/2020     HEMATOLOGY HISTORY: Mrs. Dunaway is a lady with multiple myeloma (IgG kappa). High risk, t(14;16).  1. On 05/01/2017, WBC of 3.4, hemoglobin of 10.2 and platelet of 154.  2. SPEP on 07/07/2017 revealed M-spike of 1.8.  3. Bone marrow biopsy on 07/12/2017 reveals kappa monotypic plasma cell population consistent with multiple myeloma.  Bone marrow is 70% cellular.  Plasma cell is 50-60%.     -There is gain of chromosome 1, 5, 9, 15, and 17.  There is translocation 14;16.   4.  On 07/18/2017:  -M-spike of 1.9.   -Immunofixation reveals monoclonal IgG kappa.    -IgG level of 2970.  IgA of 15 and IgM of 175.    -Kappa free light chain of 67.7.  Lambda free light chain of 1.42.  Ratio of kappa to lambda of 47.71.    -Beta 2 microglobulin of 3.4.   5. PET scan on 07/24/2017 reveals several focal areas of increased FDG uptake consistent with multiple myeloma.  There is probable epithelial cyst in tail of the pancreas.  No associated abnormal FDG activity.  Left thyroid cysts or nodules without any FDG activity.  There is a 0.3 cm left upper lobe lung nodule.   8.  Velcade, Revlimid and dexamethasone between on 08/14/2017 and 04/30/2018. Velcade stopped.   -Revlimid and dexamethasone continued.     SUBJECTIVE:  Ms. Dunaway is an 84-year-old female with high-risk multiple myeloma.  She is currently on Revlimid and dexamethasone.  Overall, she is doing.      Her overall condition has been stable.  The patient has an umbilical hernia.  Sometimes it gets bigger and causes mild discomfort.      She has some fatigue.  No headache.  No dizziness.  No visual problem.  Mouth has been dry.  The patient thinks that sometimes her speech is not clear.  Daughter was there.  As per the daughter, her speech has not changed.  There is no slurring.  No chest pain.  No shortness of breath.  No abdominal pain, nausea or vomiting.  Appetite is good.  No urinary or bowel complaints.  No bleeding.  Overall, her  condition is stable.      PHYSICAL EXAMINATION:   GENERAL:  Alert and oriented x 3.   VITAL SIGNS:  Reviewed.  ECOG PS of 2.     EYES:  No icterus.   THROAT:  No ulcer. No thrush.   NECK:  Supple. No lymphadenopathy. No thyromegaly.   AXILLAE:  No lymphadenopathy.   LUNGS:  Good air entry bilaterally.  No crackles or wheezing.   HEART:  Regular.  No murmur.   GI: Abdomen is soft.  Nontender. No mass. Difficult palpation because of her weight.  EXTREMITIES:  No pedal edema.  No calf swelling or tenderness.   SKIN:  No rash.      LABORATORY DATA:  Reviewed.      ASSESSMENT:   1.  An 84-year-old female with high-risk multiple myeloma which is stable.   2.  Other medical problems including hypertension.      PLAN:   1.  The patient clinically is stable.  She does not have any new symptoms.  Discussed regarding myeloma.  Overall, she is doing good.  She will continue on Revlimid and dexamethasone.  I will decrease the dexamethasone dose to 8 mg weekly.  Plan is to subsequently decrease it to 4 mg weekly.  She is agreeable for it.   2.  Labs were reviewed.  CBC is normal.  CMP reveals low protein.  With next lab will recheck SPEP, free light chain, and IgG level.     3.  She gets Zometa every 3 months, which will be continued.  She is tolerating it well.  No jaw related or dental related symptoms.   4.  I will see her in 2 months.  In between, she will see our nurse practitioner.         YVES PEREZ MD             D: 2020   T: 2020   MT: EMANUEL      Name:     DUGLAS CASTILLO   MRN:      [id number]        Account:      [id number]   :      1935           Visit Date:   2020      Document: [id number]

## 2020-03-12 DIAGNOSIS — I49.5 SINOATRIAL NODE DYSFUNCTION (H): ICD-10-CM

## 2020-03-12 DIAGNOSIS — I48.0 PAROXYSMAL ATRIAL FIBRILLATION (H): ICD-10-CM

## 2020-03-12 DIAGNOSIS — Z95.0 PACEMAKER: Primary | ICD-10-CM

## 2020-03-12 PROCEDURE — 93294 REM INTERROG EVL PM/LDLS PM: CPT | Performed by: INTERNAL MEDICINE

## 2020-03-12 PROCEDURE — 93296 REM INTERROG EVL PM/IDS: CPT | Performed by: INTERNAL MEDICINE

## 2020-03-18 RX ORDER — LENALIDOMIDE 10 MG/1
10 CAPSULE ORAL DAILY
Qty: 14 CAPSULE | Refills: 0 | Status: SHIPPED | OUTPATIENT
Start: 2020-03-18 | End: 2020-05-07

## 2020-03-18 RX ORDER — DEXAMETHASONE 4 MG/1
8 TABLET ORAL WEEKLY
Qty: 15 TABLET | Refills: 0 | Status: SHIPPED | OUTPATIENT
Start: 2020-03-18 | End: 2020-05-07

## 2020-03-19 ENCOUNTER — TELEPHONE (OUTPATIENT)
Dept: ONCOLOGY | Facility: CLINIC | Age: 85
End: 2020-03-19

## 2020-03-19 NOTE — TELEPHONE ENCOUNTER
Cancer Clinic Telephone Triage Note    Assessment: Patient called in to triage reporting the following symptoms: Small amount of bright red blood in stool. Pt reports symptoms started last night, and she has had ~3 stools now with small amount of BRB noted. Pt had been experiencing constipation prior to noticing the blood, so believes she could have hemorrhoid. Denies any large amount of blood, black/tarry stools and does not feel dizzy or lightheaded at this time. Patient would prefer not to to to ER unless she has to. Looking for recommendations from Dr. Parry.     Recommendations: Discussed with Petra Briggs, FAUSTINOCC for Dr. Parry. Petra will review with Dr. Parry when he arrives and advise patient.      Follow-Up: Instructed patient to seek care immediately for worsening symptoms, including: fever, chest pain, shortness of breath, dizziness. Patient voiced understanding of advice and/or instructions given.     Mary Madera, DARLENEN, RN, PHN

## 2020-03-19 NOTE — TELEPHONE ENCOUNTER
"Writer spoke with patient blood in the stool. Patient confirms it has stopped. We discussed Miralax, prunes, pear and hydration to keep the bowel movements \"regular\". Patient verbalized understanding and wants to self isolate and states she appreciated the call.    Alecia Briggs RN    "

## 2020-03-20 ENCOUNTER — TELEPHONE (OUTPATIENT)
Dept: PHARMACY | Facility: OTHER | Age: 85
End: 2020-03-20

## 2020-03-20 NOTE — TELEPHONE ENCOUNTER
Oral Chemotherapy Monitoring Program   Medication: Revlimid  Rx: 10mg PO daily on days 1 through 14 of 21 day cycle   Auth #: [id number]  Risk Category: adult female of non child bearing potential  Routine survey questions reviewed.   Rx to be Escribed to [facility] Pharm[facility] Liaison  P:[phone]

## 2020-04-01 ENCOUNTER — HOSPITAL ENCOUNTER (OUTPATIENT)
Facility: CLINIC | Age: 85
Setting detail: SPECIMEN
Discharge: HOME | End: 2020-04-01
Attending: INTERNAL MEDICINE | Admitting: INTERNAL MEDICINE
Payer: MEDICARE

## 2020-04-01 ENCOUNTER — TELEPHONE (OUTPATIENT)
Dept: ONCOLOGY | Facility: CLINIC | Age: 85
End: 2020-04-01

## 2020-04-01 ENCOUNTER — INFUSION THERAPY VISIT (OUTPATIENT)
Dept: INFUSION THERAPY | Facility: CLINIC | Age: 85
End: 2020-04-01
Attending: INTERNAL MEDICINE
Payer: MEDICARE

## 2020-04-01 ENCOUNTER — VIRTUAL VISIT (OUTPATIENT)
Dept: ONCOLOGY | Facility: CLINIC | Age: 85
End: 2020-04-01
Attending: INTERNAL MEDICINE
Payer: MEDICARE

## 2020-04-01 VITALS
SYSTOLIC BLOOD PRESSURE: 112 MMHG | BODY MASS INDEX: 27.6 KG/M2 | RESPIRATION RATE: 16 BRPM | DIASTOLIC BLOOD PRESSURE: 72 MMHG | OXYGEN SATURATION: 95 % | WEIGHT: 150 LBS | HEIGHT: 62 IN | HEART RATE: 69 BPM | TEMPERATURE: 98.4 F

## 2020-04-01 DIAGNOSIS — C90.00 MULTIPLE MYELOMA NOT HAVING ACHIEVED REMISSION (H): Primary | ICD-10-CM

## 2020-04-01 LAB
ALBUMIN SERPL-MCNC: 3 G/DL (ref 3.4–5)
ALP SERPL-CCNC: 71 U/L (ref 40–150)
ALT SERPL W P-5'-P-CCNC: 17 U/L (ref 0–50)
ANION GAP SERPL CALCULATED.3IONS-SCNC: 6 MMOL/L (ref 3–14)
AST SERPL W P-5'-P-CCNC: 11 U/L (ref 0–45)
BASOPHILS # BLD AUTO: 0 10E9/L (ref 0–0.2)
BASOPHILS NFR BLD AUTO: 1 %
BILIRUB SERPL-MCNC: 0.6 MG/DL (ref 0.2–1.3)
BUN SERPL-MCNC: 22 MG/DL (ref 7–30)
CALCIUM SERPL-MCNC: 8.7 MG/DL (ref 8.5–10.1)
CHLORIDE SERPL-SCNC: 112 MMOL/L (ref 94–109)
CO2 SERPL-SCNC: 27 MMOL/L (ref 20–32)
CREAT SERPL-MCNC: 0.53 MG/DL (ref 0.52–1.04)
DIFFERENTIAL METHOD BLD: ABNORMAL
EOSINOPHIL # BLD AUTO: 0.2 10E9/L (ref 0–0.7)
EOSINOPHIL NFR BLD AUTO: 5.7 %
ERYTHROCYTE [DISTWIDTH] IN BLOOD BY AUTOMATED COUNT: 12.9 % (ref 10–15)
GFR SERPL CREATININE-BSD FRML MDRD: 87 ML/MIN/{1.73_M2}
GLUCOSE SERPL-MCNC: 91 MG/DL (ref 70–99)
HCT VFR BLD AUTO: 36.5 % (ref 35–47)
HGB BLD-MCNC: 12.4 G/DL (ref 11.7–15.7)
IMM GRANULOCYTES # BLD: 0 10E9/L (ref 0–0.4)
IMM GRANULOCYTES NFR BLD: 0.5 %
LYMPHOCYTES # BLD AUTO: 1.3 10E9/L (ref 0.8–5.3)
LYMPHOCYTES NFR BLD AUTO: 32.5 %
MCH RBC QN AUTO: 30.3 PG (ref 26.5–33)
MCHC RBC AUTO-ENTMCNC: 34 G/DL (ref 31.5–36.5)
MCV RBC AUTO: 89 FL (ref 78–100)
MONOCYTES # BLD AUTO: 0.8 10E9/L (ref 0–1.3)
MONOCYTES NFR BLD AUTO: 19.8 %
NEUTROPHILS # BLD AUTO: 1.6 10E9/L (ref 1.6–8.3)
NEUTROPHILS NFR BLD AUTO: 40.5 %
NRBC # BLD AUTO: 0 10*3/UL
NRBC BLD AUTO-RTO: 0 /100
PLATELET # BLD AUTO: 155 10E9/L (ref 150–450)
POTASSIUM SERPL-SCNC: 3 MMOL/L (ref 3.4–5.3)
PROT SERPL-MCNC: 5.9 G/DL (ref 6.8–8.8)
RBC # BLD AUTO: 4.09 10E12/L (ref 3.8–5.2)
SODIUM SERPL-SCNC: 145 MMOL/L (ref 133–144)
WBC # BLD AUTO: 3.9 10E9/L (ref 4–11)

## 2020-04-01 PROCEDURE — 00000402 ZZHCL STATISTIC TOTAL PROTEIN: Performed by: INTERNAL MEDICINE

## 2020-04-01 PROCEDURE — 83883 ASSAY NEPHELOMETRY NOT SPEC: CPT | Performed by: INTERNAL MEDICINE

## 2020-04-01 PROCEDURE — 82784 ASSAY IGA/IGD/IGG/IGM EACH: CPT | Performed by: INTERNAL MEDICINE

## 2020-04-01 PROCEDURE — 25000128 H RX IP 250 OP 636: Performed by: INTERNAL MEDICINE

## 2020-04-01 PROCEDURE — 99442 ZZC PHYSICIAN TELEPHONE EVALUATION 11-20 MIN: CPT | Performed by: NURSE PRACTITIONER

## 2020-04-01 PROCEDURE — 80053 COMPREHEN METABOLIC PANEL: CPT | Performed by: INTERNAL MEDICINE

## 2020-04-01 PROCEDURE — 84165 PROTEIN E-PHORESIS SERUM: CPT | Performed by: INTERNAL MEDICINE

## 2020-04-01 PROCEDURE — 96365 THER/PROPH/DIAG IV INF INIT: CPT

## 2020-04-01 PROCEDURE — 85025 COMPLETE CBC W/AUTO DIFF WBC: CPT | Performed by: INTERNAL MEDICINE

## 2020-04-01 PROCEDURE — 36415 COLL VENOUS BLD VENIPUNCTURE: CPT

## 2020-04-01 RX ORDER — POTASSIUM CHLORIDE 1500 MG/1
20 TABLET, EXTENDED RELEASE ORAL 2 TIMES DAILY
Qty: 10 TABLET | Refills: 0 | Status: SHIPPED | OUTPATIENT
Start: 2020-04-01 | End: 2020-05-07

## 2020-04-01 RX ORDER — ZOLEDRONIC ACID 0.04 MG/ML
4 INJECTION, SOLUTION INTRAVENOUS ONCE
Status: COMPLETED | OUTPATIENT
Start: 2020-04-01 | End: 2020-04-01

## 2020-04-01 RX ADMIN — ZOLEDRONIC ACID 4 MG: 0.04 INJECTION, SOLUTION INTRAVENOUS at 14:24

## 2020-04-01 ASSESSMENT — MIFFLIN-ST. JEOR: SCORE: 1088.65

## 2020-04-01 ASSESSMENT — PAIN SCALES - GENERAL: PAINLEVEL: NO PAIN (0)

## 2020-04-01 NOTE — PROGRESS NOTES
"Quiana Dunaway is a 84 year old female who is being evaluated via a billable telephone visit.      The patient has been notified of following:     \"This telephone visit will be conducted via a call between you and your physician/provider. We have found that certain health care needs can be provided without the need for a physical exam.  This service lets us provide the care you need with a short phone conversation.  If a prescription is necessary we can send it directly to your pharmacy.  If lab work is needed we can place an order for that and you can then stop by our lab to have the test done at a later time.    If during the course of the call the physician/provider feels a telephone visit is not appropriate, you will not be charged for this service.\"     Patient has given verbal consent for Telephone visit?  Yes    Quiana Dunaway complains of    Chief Complaint   Patient presents with     PHONE VISIT     PHONE VISIT       I have reviewed and updated the patient's Past Medical History, Social History, Family History and Medication List.    ALLERGIES  Levaquin [levofloxacin]    Additional provider notes: insert own note template here No concerns, No pain. Medications and Allergies were reviewed.  Not sure if she needs refills on her Acyclovir.    Phone call duration: 5 minutes    Mariela Lyn CMA  "

## 2020-04-01 NOTE — TELEPHONE ENCOUNTER
"Quiana Dunaway is a 84 year old female who is being evaluated via a billable telephone visit.      The patient has been notified of following:     \"This telephone visit will be conducted via a call between you and your physician/provider. We have found that certain health care needs can be provided without the need for a physical exam.  This service lets us provide the care you need with a short phone conversation.  If a prescription is necessary we can send it directly to your pharmacy.  If lab work is needed we can place an order for that and you can then stop by our lab to have the test done at a later time.    If during the course of the call the physician/provider feels a telephone visit is not appropriate, you will not be charged for this service.\"     Patient has given verbal consent for Telephone visit?  Yes    Quiana Dunaway complains of  No chief complaint on file.      I have reviewed and updated the patient's Past Medical History, Social History, Family History and Medication List.    ALLERGIES  Levaquin [levofloxacin]    Additional provider notes:     Phone call duration:  minutes    Mariela Lyn CMA    "

## 2020-04-01 NOTE — PATIENT INSTRUCTIONS
Ok to give zometa  schedule for labs and  zometa in 3 months   Potassium sent to pharmacy  Schedule  with Dr Parry on 05/07    Please call patient to schedule appts

## 2020-04-01 NOTE — PROGRESS NOTES
"Duglas Castillo is a 84 year old female who is being evaluated via a billable telephone visit.      The patient has been notified of following:     \"This telephone visit will be conducted via a call between you and your physician/provider. We have found that certain health care needs can be provided without the need for a physical exam.  This service lets us provide the care you need with a short phone conversation.  If a prescription is necessary we can send it directly to your pharmacy.  If lab work is needed we can place an order for that and you can then stop by our lab to have the test done at a later time.    If during the course of the call the physician/provider feels a telephone visit is not appropriate, you will not be charged for this service.\"     Patient has given verbal consent for Telephone visit?  Yes     Subjective  Patient reports she continues to feel well.  She denies fever chills sweats denies cough no shortness of breath denies chest pain denies nausea vomiting diarrhea denies abdominal pain denies bleeding     ROS: 14 point ROS neg other than the symptoms noted above in the HPI.    Results for DUGLAS CASTILLO (MRN [id number]) as of 4/1/2020 15:25   Ref. Range 4/1/2020 13:40   Sodium Latest Ref Range: 133 - 144 mmol/L 145 (H)   Potassium Latest Ref Range: 3.4 - 5.3 mmol/L 3.0 (L)   Chloride Latest Ref Range: 94 - 109 mmol/L 112 (H)   Carbon Dioxide Latest Ref Range: 20 - 32 mmol/L 27   Urea Nitrogen Latest Ref Range: 7 - 30 mg/dL 22   Creatinine Latest Ref Range: 0.52 - 1.04 mg/dL 0.53   GFR Estimate Latest Ref Range: >60 mL/min/1.73_m2 87   GFR Estimate If Black Latest Ref Range: >60 mL/min/1.73_m2 >90   Calcium Latest Ref Range: 8.5 - 10.1 mg/dL 8.7   Anion Gap Latest Ref Range: 3 - 14 mmol/L 6   Albumin Latest Ref Range: 3.4 - 5.0 g/dL 3.0 (L)   Protein Total Latest Ref Range: 6.8 - 8.8 g/dL 5.9 (L)   Bilirubin Total Latest Ref Range: 0.2 - 1.3 mg/dL 0.6   Alkaline Phosphatase Latest Ref " Range: 40 - 150 U/L 71   ALT Latest Ref Range: 0 - 50 U/L 17   AST Latest Ref Range: 0 - 45 U/L 11   Glucose Latest Ref Range: 70 - 99 mg/dL 91   WBC Latest Ref Range: 4.0 - 11.0 10e9/L 3.9 (L)   Hemoglobin Latest Ref Range: 11.7 - 15.7 g/dL 12.4   Hematocrit Latest Ref Range: 35.0 - 47.0 % 36.5   Platelet Count Latest Ref Range: 150 - 450 10e9/L 155   RBC Count Latest Ref Range: 3.8 - 5.2 10e12/L 4.09   MCV Latest Ref Range: 78 - 100 fl 89   MCH Latest Ref Range: 26.5 - 33.0 pg 30.3   MCHC Latest Ref Range: 31.5 - 36.5 g/dL 34.0   RDW Latest Ref Range: 10.0 - 15.0 % 12.9   Diff Method Unknown Automated Method   % Neutrophils Latest Units: % 40.5   % Lymphocytes Latest Units: % 32.5   % Monocytes Latest Units: % 19.8   % Eosinophils Latest Units: % 5.7   % Basophils Latest Units: % 1.0   % Immature Granulocytes Latest Units: % 0.5   Nucleated RBCs Latest Ref Range: 0 /100 0   Absolute Neutrophil Latest Ref Range: 1.6 - 8.3 10e9/L 1.6   Absolute Lymphocytes Latest Ref Range: 0.8 - 5.3 10e9/L 1.3   Absolute Monocytes Latest Ref Range: 0.0 - 1.3 10e9/L 0.8   Absolute Eosinophils Latest Ref Range: 0.0 - 0.7 10e9/L 0.2   Absolute Basophils Latest Ref Range: 0.0 - 0.2 10e9/L 0.0   Abs Immature Granulocytes Latest Ref Range: 0 - 0.4 10e9/L 0.0   Absolute Nucleated RBC Unknown 0.0     I have reviewed and updated the patient's Past Medical History, Social History, Family History and Medication List.    ALLERGIES  Levaquin [levofloxacin]        Assessment and plan    This is a 84-year-old female with    Multiple myeloma  -Patient is clinically doing well.  Patient reports that she has been tolerating treatment well  Continue with Revlimid   Continue dexamethasone 4 mg every week    Prophylaxis  Continue with aspirin and acyclovir    Bone health  Labs reviewed okay to give Zometa.  Patient continue with Zometa every 3 months        Phone call duration: 20 minutes

## 2020-04-01 NOTE — PROGRESS NOTES
Infusion Nursing Note:  Quiana Dunaway presents today for Zometa infusion.    Patient seen by provider today: Yes: Alex Parry MD Telephone visit.   present during visit today: Not Applicable.    Note: N/A.    Intravenous Access:  Labs drawn without difficulty.  Peripheral IV placed.    Treatment Conditions:  Lab Results   Component Value Date    HGB 12.4 04/01/2020     Lab Results   Component Value Date    WBC 3.9 04/01/2020      Lab Results   Component Value Date    ANEU 1.6 04/01/2020     Lab Results   Component Value Date     04/01/2020      Lab Results   Component Value Date     04/01/2020                   Lab Results   Component Value Date    POTASSIUM 3.0 04/01/2020           No results found for: MAG         Lab Results   Component Value Date    CR 0.53 04/01/2020                   Lab Results   Component Value Date    HUMBERTO 8.7 04/01/2020                Lab Results   Component Value Date    BILITOTAL 0.6 04/01/2020           Lab Results   Component Value Date    ALBUMIN 3.0 04/01/2020                    Lab Results   Component Value Date    ALT 17 04/01/2020           Lab Results   Component Value Date    AST 11 04/01/2020       Results reviewed, labs MET treatment parameters, ok to proceed with treatment.      Post Infusion Assessment:  Patient tolerated infusion without incident.  Site free from redness, edema or discomfort.  No evidence of extravasations.  Access discontinued per protocol.       Discharge Plan:   Prescription given for Potassium supplements BID x 5 days.  Discharge instructions reviewed with: Patient.  Patient and/or family verbalized understanding of discharge instructions and all questions answered.  Copy of AVS reviewed with patient and/or family.  Patient will return 5/7/20 for next appointment.  Patient discharged in stable condition accompanied by: self.    Dianelys Zhou RN, RN

## 2020-04-02 LAB
ALBUMIN SERPL ELPH-MCNC: 3.4 G/DL (ref 3.7–5.1)
ALPHA1 GLOB SERPL ELPH-MCNC: 0.3 G/DL (ref 0.2–0.4)
ALPHA2 GLOB SERPL ELPH-MCNC: 0.7 G/DL (ref 0.5–0.9)
B-GLOBULIN SERPL ELPH-MCNC: 0.6 G/DL (ref 0.6–1)
GAMMA GLOB SERPL ELPH-MCNC: 0.6 G/DL (ref 0.7–1.6)
IGG SERPL-MCNC: 638 MG/DL (ref 610–1616)
KAPPA LC UR-MCNC: 10.38 MG/DL (ref 0.33–1.94)
KAPPA LC/LAMBDA SER: 23.59 {RATIO} (ref 0.26–1.65)
LAMBDA LC SERPL-MCNC: 0.44 MG/DL (ref 0.57–2.63)
M PROTEIN SERPL ELPH-MCNC: 0.4 G/DL
PROT PATTERN SERPL ELPH-IMP: ABNORMAL

## 2020-04-03 ENCOUNTER — DOCUMENTATION ONLY (OUTPATIENT)
Dept: PHARMACY | Facility: CLINIC | Age: 85
End: 2020-04-03

## 2020-04-03 LAB
MDC_IDC_EPISODE_DTM: NORMAL
MDC_IDC_EPISODE_DURATION: 1 S
MDC_IDC_EPISODE_DURATION: 5 S
MDC_IDC_EPISODE_DURATION: 91 S
MDC_IDC_EPISODE_ID: 23
MDC_IDC_EPISODE_ID: 24
MDC_IDC_EPISODE_ID: 25
MDC_IDC_EPISODE_TYPE: NORMAL
MDC_IDC_LEAD_IMPLANT_DT: NORMAL
MDC_IDC_LEAD_IMPLANT_DT: NORMAL
MDC_IDC_LEAD_LOCATION: NORMAL
MDC_IDC_LEAD_LOCATION: NORMAL
MDC_IDC_LEAD_LOCATION_DETAIL_1: NORMAL
MDC_IDC_LEAD_LOCATION_DETAIL_1: NORMAL
MDC_IDC_LEAD_MFG: NORMAL
MDC_IDC_LEAD_MFG: NORMAL
MDC_IDC_LEAD_MODEL: NORMAL
MDC_IDC_LEAD_MODEL: NORMAL
MDC_IDC_LEAD_POLARITY_TYPE: NORMAL
MDC_IDC_LEAD_POLARITY_TYPE: NORMAL
MDC_IDC_LEAD_SERIAL: NORMAL
MDC_IDC_LEAD_SERIAL: NORMAL
MDC_IDC_MSMT_BATTERY_DTM: NORMAL
MDC_IDC_MSMT_BATTERY_REMAINING_LONGEVITY: 91 MO
MDC_IDC_MSMT_BATTERY_RRT_TRIGGER: 2.83
MDC_IDC_MSMT_BATTERY_STATUS: NORMAL
MDC_IDC_MSMT_BATTERY_VOLTAGE: 3.02 V
MDC_IDC_MSMT_LEADCHNL_RA_IMPEDANCE_VALUE: 342 OHM
MDC_IDC_MSMT_LEADCHNL_RA_IMPEDANCE_VALUE: 380 OHM
MDC_IDC_MSMT_LEADCHNL_RA_PACING_THRESHOLD_AMPLITUDE: 0.5 V
MDC_IDC_MSMT_LEADCHNL_RA_PACING_THRESHOLD_PULSEWIDTH: 0.4 MS
MDC_IDC_MSMT_LEADCHNL_RA_SENSING_INTR_AMPL: 3.25 MV
MDC_IDC_MSMT_LEADCHNL_RA_SENSING_INTR_AMPL: 3.25 MV
MDC_IDC_MSMT_LEADCHNL_RV_IMPEDANCE_VALUE: 399 OHM
MDC_IDC_MSMT_LEADCHNL_RV_IMPEDANCE_VALUE: 437 OHM
MDC_IDC_MSMT_LEADCHNL_RV_PACING_THRESHOLD_AMPLITUDE: 1.62 V
MDC_IDC_MSMT_LEADCHNL_RV_PACING_THRESHOLD_PULSEWIDTH: 0.4 MS
MDC_IDC_MSMT_LEADCHNL_RV_SENSING_INTR_AMPL: 5 MV
MDC_IDC_MSMT_LEADCHNL_RV_SENSING_INTR_AMPL: 5 MV
MDC_IDC_PG_IMPLANT_DTM: NORMAL
MDC_IDC_PG_MFG: NORMAL
MDC_IDC_PG_MODEL: NORMAL
MDC_IDC_PG_SERIAL: NORMAL
MDC_IDC_PG_TYPE: NORMAL
MDC_IDC_SESS_CLINIC_NAME: NORMAL
MDC_IDC_SESS_DTM: NORMAL
MDC_IDC_SESS_TYPE: NORMAL
MDC_IDC_SET_BRADY_AT_MODE_SWITCH_RATE: 171 {BEATS}/MIN
MDC_IDC_SET_BRADY_HYSTRATE: NORMAL
MDC_IDC_SET_BRADY_LOWRATE: 60 {BEATS}/MIN
MDC_IDC_SET_BRADY_MAX_SENSOR_RATE: 120 {BEATS}/MIN
MDC_IDC_SET_BRADY_MAX_TRACKING_RATE: 120 {BEATS}/MIN
MDC_IDC_SET_BRADY_MODE: NORMAL
MDC_IDC_SET_BRADY_PAV_DELAY_LOW: 300 MS
MDC_IDC_SET_BRADY_SAV_DELAY_LOW: 300 MS
MDC_IDC_SET_LEADCHNL_RA_PACING_AMPLITUDE: 1.5 V
MDC_IDC_SET_LEADCHNL_RA_PACING_ANODE_ELECTRODE_1: NORMAL
MDC_IDC_SET_LEADCHNL_RA_PACING_ANODE_LOCATION_1: NORMAL
MDC_IDC_SET_LEADCHNL_RA_PACING_CAPTURE_MODE: NORMAL
MDC_IDC_SET_LEADCHNL_RA_PACING_CATHODE_ELECTRODE_1: NORMAL
MDC_IDC_SET_LEADCHNL_RA_PACING_CATHODE_LOCATION_1: NORMAL
MDC_IDC_SET_LEADCHNL_RA_PACING_POLARITY: NORMAL
MDC_IDC_SET_LEADCHNL_RA_PACING_PULSEWIDTH: 0.4 MS
MDC_IDC_SET_LEADCHNL_RA_SENSING_ANODE_ELECTRODE_1: NORMAL
MDC_IDC_SET_LEADCHNL_RA_SENSING_ANODE_LOCATION_1: NORMAL
MDC_IDC_SET_LEADCHNL_RA_SENSING_CATHODE_ELECTRODE_1: NORMAL
MDC_IDC_SET_LEADCHNL_RA_SENSING_CATHODE_LOCATION_1: NORMAL
MDC_IDC_SET_LEADCHNL_RA_SENSING_POLARITY: NORMAL
MDC_IDC_SET_LEADCHNL_RA_SENSING_SENSITIVITY: 0.3 MV
MDC_IDC_SET_LEADCHNL_RV_PACING_AMPLITUDE: 3.25 V
MDC_IDC_SET_LEADCHNL_RV_PACING_ANODE_ELECTRODE_1: NORMAL
MDC_IDC_SET_LEADCHNL_RV_PACING_ANODE_LOCATION_1: NORMAL
MDC_IDC_SET_LEADCHNL_RV_PACING_CAPTURE_MODE: NORMAL
MDC_IDC_SET_LEADCHNL_RV_PACING_CATHODE_ELECTRODE_1: NORMAL
MDC_IDC_SET_LEADCHNL_RV_PACING_CATHODE_LOCATION_1: NORMAL
MDC_IDC_SET_LEADCHNL_RV_PACING_POLARITY: NORMAL
MDC_IDC_SET_LEADCHNL_RV_PACING_PULSEWIDTH: 0.4 MS
MDC_IDC_SET_LEADCHNL_RV_SENSING_ANODE_ELECTRODE_1: NORMAL
MDC_IDC_SET_LEADCHNL_RV_SENSING_ANODE_LOCATION_1: NORMAL
MDC_IDC_SET_LEADCHNL_RV_SENSING_CATHODE_ELECTRODE_1: NORMAL
MDC_IDC_SET_LEADCHNL_RV_SENSING_CATHODE_LOCATION_1: NORMAL
MDC_IDC_SET_LEADCHNL_RV_SENSING_POLARITY: NORMAL
MDC_IDC_SET_LEADCHNL_RV_SENSING_SENSITIVITY: 0.9 MV
MDC_IDC_SET_ZONE_DETECTION_INTERVAL: 350 MS
MDC_IDC_SET_ZONE_DETECTION_INTERVAL: 400 MS
MDC_IDC_SET_ZONE_TYPE: NORMAL
MDC_IDC_STAT_AT_BURDEN_PERCENT: 0 %
MDC_IDC_STAT_AT_DTM_END: NORMAL
MDC_IDC_STAT_AT_DTM_START: NORMAL
MDC_IDC_STAT_BRADY_AP_VP_PERCENT: 0.05 %
MDC_IDC_STAT_BRADY_AP_VS_PERCENT: 51.51 %
MDC_IDC_STAT_BRADY_AS_VP_PERCENT: 0.02 %
MDC_IDC_STAT_BRADY_AS_VS_PERCENT: 48.42 %
MDC_IDC_STAT_BRADY_DTM_END: NORMAL
MDC_IDC_STAT_BRADY_DTM_START: NORMAL
MDC_IDC_STAT_BRADY_RA_PERCENT_PACED: 51.45 %
MDC_IDC_STAT_BRADY_RV_PERCENT_PACED: 0.07 %
MDC_IDC_STAT_EPISODE_RECENT_COUNT: 0
MDC_IDC_STAT_EPISODE_RECENT_COUNT: 0
MDC_IDC_STAT_EPISODE_RECENT_COUNT: 1
MDC_IDC_STAT_EPISODE_RECENT_COUNT: 1
MDC_IDC_STAT_EPISODE_RECENT_COUNT_DTM_END: NORMAL
MDC_IDC_STAT_EPISODE_RECENT_COUNT_DTM_START: NORMAL
MDC_IDC_STAT_EPISODE_TOTAL_COUNT: 0
MDC_IDC_STAT_EPISODE_TOTAL_COUNT: 0
MDC_IDC_STAT_EPISODE_TOTAL_COUNT: 16
MDC_IDC_STAT_EPISODE_TOTAL_COUNT: 8
MDC_IDC_STAT_EPISODE_TOTAL_COUNT_DTM_END: NORMAL
MDC_IDC_STAT_EPISODE_TOTAL_COUNT_DTM_START: NORMAL
MDC_IDC_STAT_EPISODE_TYPE: NORMAL

## 2020-04-03 NOTE — PROGRESS NOTES
Oral Chemotherapy Monitoring Program  Lab Follow Up    Patient currently on Revlimid therapy for multiple myeloma.        Reviewed lab results from 4/1/20.     Labs:  _  Result Component Current Result Ref Range   Sodium 145 (H) (4/1/2020) 133 - 144 mmol/L     _  Result Component Current Result Ref Range   Potassium 3.0 (L) (4/1/2020) 3.4 - 5.3 mmol/L     _  Result Component Current Result Ref Range   Calcium 8.7 (4/1/2020) 8.5 - 10.1 mg/dL     No results found for Mag within last 30 days.     No results found for Phos within last 30 days.     _  Result Component Current Result Ref Range   Albumin 3.0 (L) (4/1/2020) 3.4 - 5.0 g/dL     _  Result Component Current Result Ref Range   Urea Nitrogen 22 (4/1/2020) 7 - 30 mg/dL     _  Result Component Current Result Ref Range   Creatinine 0.53 (4/1/2020) 0.52 - 1.04 mg/dL       _  Result Component Current Result Ref Range   AST 11 (4/1/2020) 0 - 45 U/L     _  Result Component Current Result Ref Range   ALT 17 (4/1/2020) 0 - 50 U/L     _  Result Component Current Result Ref Range   Bilirubin Total 0.6 (4/1/2020) 0.2 - 1.3 mg/dL       _  Result Component Current Result Ref Range   WBC 3.9 (L) (4/1/2020) 4.0 - 11.0 10e9/L     _  Result Component Current Result Ref Range   Hemoglobin 12.4 (4/1/2020) 11.7 - 15.7 g/dL     _  Result Component Current Result Ref Range   Platelet Count 155 (4/1/2020) 150 - 450 10e9/L     _  Result Component Current Result Ref Range   Absolute Neutrophil 1.6 (4/1/2020) 1.6 - 8.3 10e9/L       Assessment & Plan:  Started potassium supplement. No other concerning abnormalities. Continue Revlimid 10mg days 1 to 14 of 21 day cycle.     Follow-Up:  5/7: Dr. Parry appointment and labs    Ezra Reese, PharmD  Hematology/Oncology Clinical Pharmacist  [facility]

## 2020-04-06 ENCOUNTER — TELEPHONE (OUTPATIENT)
Dept: PHARMACY | Facility: CLINIC | Age: 85
End: 2020-04-06

## 2020-04-06 RX ORDER — DEXAMETHASONE 4 MG/1
8 TABLET ORAL WEEKLY
Qty: 15 TABLET | Refills: 0 | Status: SHIPPED | OUTPATIENT
Start: 2020-04-06 | End: 2020-05-07

## 2020-04-06 RX ORDER — LENALIDOMIDE 10 MG/1
10 CAPSULE ORAL DAILY
Qty: 14 CAPSULE | Refills: 0 | Status: SHIPPED | OUTPATIENT
Start: 2020-04-06 | End: 2020-05-07

## 2020-04-06 NOTE — TELEPHONE ENCOUNTER
Oral Chemotherapy Monitoring Program   Medication: Revlimid  Rx: 10mg PO daily on days 1 through 14 of 21 day cycle   Auth #: [id number] Authorization #: [id number]  Obtained on: 4/6/2020   Risk Category: adult female of non child bearing potential  Routine survey questions reviewed.   Rx to be Escribed to [facility]    Terrie Bowen [facility] Oncology Pharmacy Liaison  [phone]

## 2020-04-07 DIAGNOSIS — C90.00 MULTIPLE MYELOMA NOT HAVING ACHIEVED REMISSION (H): ICD-10-CM

## 2020-04-07 RX ORDER — ACYCLOVIR 400 MG/1
400 TABLET ORAL 2 TIMES DAILY
Qty: 60 TABLET | Refills: 5 | Status: SHIPPED | OUTPATIENT
Start: 2020-04-07 | End: 2020-10-02

## 2020-04-07 NOTE — TELEPHONE ENCOUNTER
Received a faxed refill request for Acyclovir.  Per most recent note, patient is to continue with Acyclovir for prophylaxis.    Refill Auth sent to Dr. Parry.    Jimy Hernandez, DARLENEN, RN, OCN  Oncology Care Coordinator  [facility]

## 2020-04-14 DIAGNOSIS — I49.5 SICK SINUS SYNDROME (H): Primary | ICD-10-CM

## 2020-05-05 ENCOUNTER — TELEPHONE (OUTPATIENT)
Dept: PHARMACY | Facility: CLINIC | Age: 85
End: 2020-05-05

## 2020-05-05 DIAGNOSIS — C90.00 MULTIPLE MYELOMA NOT HAVING ACHIEVED REMISSION (H): Primary | ICD-10-CM

## 2020-05-05 RX ORDER — LENALIDOMIDE 10 MG/1
10 CAPSULE ORAL DAILY
Qty: 14 CAPSULE | Refills: 0 | Status: SHIPPED | OUTPATIENT
Start: 2020-05-05 | End: 2020-06-02

## 2020-05-05 RX ORDER — DEXAMETHASONE 4 MG/1
8 TABLET ORAL WEEKLY
Qty: 15 TABLET | Refills: 0 | Status: SHIPPED | OUTPATIENT
Start: 2020-05-05 | End: 2020-05-07

## 2020-05-05 NOTE — TELEPHONE ENCOUNTER
Oral Chemotherapy Monitoring Program   Medication: Revlimid  Rx: 10mg PO daily on days 1 through 14 of 21 day cycle   Auth #: [id number]  Obtained on: 5/5/2020   Risk Category: adult female of non-child bearing potential   Routine survey questions reviewed.   Rx to be Escribed to [facility]    Terrie Bowen [facility] Oncology Pharmacy Liaison  [phone]

## 2020-05-06 ENCOUNTER — TELEPHONE (OUTPATIENT)
Dept: ONCOLOGY | Facility: CLINIC | Age: 85
End: 2020-05-06

## 2020-05-06 DIAGNOSIS — N32.81 OAB (OVERACTIVE BLADDER): ICD-10-CM

## 2020-05-06 NOTE — TELEPHONE ENCOUNTER
"Patient is currently scheduled for an appointment at [facility] in [place].  Called patient to review current visitor restrictions and complete COVID-19 Patient Infection/Travel Screening Tool.     Due to the recent public health concerns around COVID-19 and in an effort to keep our patients and staff safe and healthy, we are implementing a screening process for the patients that come to our clinic.      I am going to ask you a few questions, please answer yes or no.  Your honesty about any symptoms is critical, as it keeps patients and staff healthy.      Do you have a:  Fever (or reported chills)?  No  New cough (started within the past 14 days)?  No  New shortness of breath (started within the past 14 days)?  No  Rash?  No    In the last month, have you been in contact with someone who was confirmed or suspected to have Coronavirus/COVID-19?  No    Have you traveled internationally in the last month?  No  If so, where?  N/A     I also wanted to let you know that to protect our patients from the flu and other common illnesses, [facility] enforce visitor restrictions year round, but due to the community spread of COVID-19 in [place], we are taking additional precautionary steps to ensure the health of our patients.  At this time, NO visitors are allowed on our hospital and clinic campuses.     Patient PASSED the screening assessment.    Patient instructed to come to the clinic as planned for their scheduled appointment and to call the clinic if any symptoms develop prior to their appointment.    \"Per CDC Guidelines, we are asking all patients that are coming into the building to wear a cloth covering that covers your mouth and nose.  You will be screened again at the entrance to the clinic for any Covid 19 symptoms. If you screen positive to any Covid 19 symptoms during our screening process you will be provided a surgical mask to wear during your time in the " "building.\"    \"COVID-19 is contagious and can be dangerous for our patients and staff.  Please send us a Eight19 message or call our clinic before coming in if you feel any of the following symptoms: fever, cough, congestion, runny nose, sore throat, muscle aches and pains, or shortness of breath.  If you are already at our clinic, it is very important that you be honest about any symptoms you are experiencing to ensure your safety and that of other patients and staff who treat you.  If you do have symptoms, we will have a nurse and/or provider asses you to determine next steps.\"    Lauren Arredondo, CMA on 5/6/2020 at 1:38 PM      "

## 2020-05-07 ENCOUNTER — ONCOLOGY VISIT (OUTPATIENT)
Dept: ONCOLOGY | Facility: CLINIC | Age: 85
End: 2020-05-07
Attending: INTERNAL MEDICINE
Payer: MEDICARE

## 2020-05-07 ENCOUNTER — INFUSION THERAPY VISIT (OUTPATIENT)
Dept: INFUSION THERAPY | Facility: CLINIC | Age: 85
End: 2020-05-07
Attending: INTERNAL MEDICINE
Payer: MEDICARE

## 2020-05-07 ENCOUNTER — HOSPITAL ENCOUNTER (OUTPATIENT)
Facility: CLINIC | Age: 85
Setting detail: SPECIMEN
Discharge: HOME | End: 2020-05-07
Attending: INTERNAL MEDICINE | Admitting: INTERNAL MEDICINE
Payer: MEDICARE

## 2020-05-07 VITALS
TEMPERATURE: 98.6 F | HEART RATE: 70 BPM | RESPIRATION RATE: 16 BRPM | DIASTOLIC BLOOD PRESSURE: 82 MMHG | HEIGHT: 62 IN | BODY MASS INDEX: 28.3 KG/M2 | WEIGHT: 153.8 LBS | OXYGEN SATURATION: 95 % | SYSTOLIC BLOOD PRESSURE: 131 MMHG

## 2020-05-07 DIAGNOSIS — R06.02 SHORTNESS OF BREATH: ICD-10-CM

## 2020-05-07 DIAGNOSIS — C90.00 MULTIPLE MYELOMA NOT HAVING ACHIEVED REMISSION (H): Primary | ICD-10-CM

## 2020-05-07 DIAGNOSIS — C90.00 MULTIPLE MYELOMA NOT HAVING ACHIEVED REMISSION (H): ICD-10-CM

## 2020-05-07 LAB
ALBUMIN SERPL-MCNC: 3.2 G/DL (ref 3.4–5)
ALP SERPL-CCNC: 64 U/L (ref 40–150)
ALT SERPL W P-5'-P-CCNC: 18 U/L (ref 0–50)
ANION GAP SERPL CALCULATED.3IONS-SCNC: 9 MMOL/L (ref 3–14)
AST SERPL W P-5'-P-CCNC: 8 U/L (ref 0–45)
BASOPHILS # BLD AUTO: 0 10E9/L (ref 0–0.2)
BASOPHILS NFR BLD AUTO: 0 %
BILIRUB SERPL-MCNC: 0.6 MG/DL (ref 0.2–1.3)
BUN SERPL-MCNC: 24 MG/DL (ref 7–30)
CALCIUM SERPL-MCNC: 8.8 MG/DL (ref 8.5–10.1)
CHLORIDE SERPL-SCNC: 109 MMOL/L (ref 94–109)
CO2 SERPL-SCNC: 24 MMOL/L (ref 20–32)
CREAT SERPL-MCNC: 0.69 MG/DL (ref 0.52–1.04)
DACRYOCYTES BLD QL SMEAR: SLIGHT
DIFFERENTIAL METHOD BLD: NORMAL
ELLIPTOCYTES BLD QL SMEAR: SLIGHT
EOSINOPHIL # BLD AUTO: 0.3 10E9/L (ref 0–0.7)
EOSINOPHIL NFR BLD AUTO: 6 %
ERYTHROCYTE [DISTWIDTH] IN BLOOD BY AUTOMATED COUNT: 12.9 % (ref 10–15)
GFR SERPL CREATININE-BSD FRML MDRD: 80 ML/MIN/{1.73_M2}
GLUCOSE SERPL-MCNC: 102 MG/DL (ref 70–99)
HCT VFR BLD AUTO: 37.6 % (ref 35–47)
HGB BLD-MCNC: 13 G/DL (ref 11.7–15.7)
LYMPHOCYTES # BLD AUTO: 1.9 10E9/L (ref 0.8–5.3)
LYMPHOCYTES NFR BLD AUTO: 44 %
MCH RBC QN AUTO: 30.7 PG (ref 26.5–33)
MCHC RBC AUTO-ENTMCNC: 34.6 G/DL (ref 31.5–36.5)
MCV RBC AUTO: 89 FL (ref 78–100)
MONOCYTES # BLD AUTO: 0.5 10E9/L (ref 0–1.3)
MONOCYTES NFR BLD AUTO: 11 %
NEUTROPHILS # BLD AUTO: 1.7 10E9/L (ref 1.6–8.3)
NEUTROPHILS NFR BLD AUTO: 39 %
PLATELET # BLD AUTO: 168 10E9/L (ref 150–450)
PLATELET # BLD EST: NORMAL 10*3/UL
POTASSIUM SERPL-SCNC: 3.8 MMOL/L (ref 3.4–5.3)
PROT SERPL-MCNC: 6.2 G/DL (ref 6.8–8.8)
RBC # BLD AUTO: 4.23 10E12/L (ref 3.8–5.2)
SODIUM SERPL-SCNC: 142 MMOL/L (ref 133–144)
WBC # BLD AUTO: 4.3 10E9/L (ref 4–11)

## 2020-05-07 PROCEDURE — 99214 OFFICE O/P EST MOD 30 MIN: CPT | Performed by: INTERNAL MEDICINE

## 2020-05-07 PROCEDURE — 36415 COLL VENOUS BLD VENIPUNCTURE: CPT

## 2020-05-07 PROCEDURE — G0463 HOSPITAL OUTPT CLINIC VISIT: HCPCS

## 2020-05-07 PROCEDURE — 85025 COMPLETE CBC W/AUTO DIFF WBC: CPT | Performed by: INTERNAL MEDICINE

## 2020-05-07 PROCEDURE — 80053 COMPREHEN METABOLIC PANEL: CPT | Performed by: INTERNAL MEDICINE

## 2020-05-07 RX ORDER — MIRABEGRON 25 MG/1
TABLET, FILM COATED, EXTENDED RELEASE ORAL
Qty: 90 TABLET | Refills: 1 | Status: SHIPPED | OUTPATIENT
Start: 2020-05-07 | End: 2021-01-14

## 2020-05-07 RX ORDER — DEXAMETHASONE 4 MG/1
4 TABLET ORAL WEEKLY
Qty: 20 TABLET | Refills: 0 | Status: SHIPPED | OUTPATIENT
Start: 2020-05-07 | End: 2020-06-02

## 2020-05-07 ASSESSMENT — PAIN SCALES - GENERAL: PAINLEVEL: MODERATE PAIN (5)

## 2020-05-07 ASSESSMENT — MIFFLIN-ST. JEOR: SCORE: 1105.88

## 2020-05-07 NOTE — PROGRESS NOTES
Medical Assistant Note:  Quiana Dunaway presents today for BLOOD DRAW.    Patient seen by provider today: Yes: CHRIS.   present during visit today: Not Applicable.    Concerns: No Concerns.    Procedure:  Lab draw site: LAC, Needle type: BF, Gauge: 23.    Post Assessment:  Labs drawn without difficulty: Yes.    Discharge Plan:  Departure Mode: Ambulatory.    Face to Face Time: 5 MIN.    Mariela Lyn, CMA

## 2020-05-07 NOTE — TELEPHONE ENCOUNTER
Has refills but different pharmacy requesting  Prescription approved per [facility] Refill Protocol.

## 2020-05-07 NOTE — LETTER
"    5/7/2020         RE: Quiana Dunaway  [address] [facility] [address]        Dear Colleague,    Thank you for referring your patient, Quiana Dunaway, to the [facility]. Please see a copy of my visit note below.    Oncology Rooming Note    May 7, 2020 1:38 PM   Quiana Dunaway is a 84 year old female who presents for:    Chief Complaint   Patient presents with     Oncology Clinic Visit     Initial Vitals: /82   Pulse 70   Temp 98.6  F (37  C)   Resp 16   Ht 1.575 m (5' 2\")   Wt 69.8 kg (153 lb 12.8 oz)   SpO2 95%   BMI 28.13 kg/m   Estimated body mass index is 28.13 kg/m  as calculated from the following:    Height as of this encounter: 1.575 m (5' 2\").    Weight as of this encounter: 69.8 kg (153 lb 12.8 oz). Body surface area is 1.75 meters squared.  Moderate Pain (5) Comment: Data Unavailable   No LMP recorded. Patient is postmenopausal.  Allergies reviewed: Yes  Medications reviewed: Yes    Medications: Medication refills not needed today.  Pharmacy name entered into [employer]:    [id number] - [place], [place] - [address] AT [facility] [address]  [id number] - [place], [place] - [address] [facility] [address] [facility] - [place], [place] - [address]    Clinical concerns:  doctor was notified.      Jacquie Mcdaniel MA              Visit Date:   05/07/2020      SUBJECTIVE:  Ms. Dunaway is an 84-year-old female with high-risk multiple myeloma.  She is on Revlimid and dexamethasone.  She is taking dexamethasone 8 mg weekly.      Lately, she has been more tired.  The patient also gets short of breath on minimal exertion.  She does have cardiac disease and follows up with Cardiology.      No headache.  Intermittent dizziness.  No ear pain, sore throat.  No neck pain.  No chest pain.  No abdominal pain, nausea or vomiting.  Appetite is fairly good.  No urinary complaints.  Occasional constipation.  She gets " intermittent back pain.  Back pain is more with activities.  She takes Aleve which helps.  No radiation of pain to the lower extremities.  No numbness or tingling in the lower extremities.      PHYSICAL EXAMINATION:  Unchanged.      VITAL SIGNS:  ECOG PS of 2.      LABORATORY DATA:  Reviewed.      ASSESSMENT:   1.  An 84-year-old female with high-risk multiple myeloma, on Revlimid and dexamethasone.  She is in partial remission.   2.  Shortness of breath on exertion.   3.  Back pain from arthritis, could be due to myeloma.   4.  Fatigue due to her age, myeloma, Revlimid, and shortness of breath.      PLAN:   1.  I discussed regarding myeloma.  She is stable from that.  She will continue on Revlimid and dexamethasone.  We will reduce the dexamethasone to 4 mg weekly.  She is agreeable for it.  With regards to myeloma.  I will see her in 2 months' time.  Advised her to call us with any questions or concerns.   2.  Discussed regarding shortness of breath.  I am worried about cardiac problem causing it.  We will get an echocardiogram.  She is agreeable for it.   3.  Discussed with her regarding back pain, intermittent.  If it gets worse, we will get imaging studies.  MRI cannot be done as she has a pacemaker.  As her pain is mild and intermittent, she will continue with pain medication as needed.   4.  She will continue on Zometa every 3 months.   5.  I will see her in a month.  Advised her to call us with any questions or concerns.      TOTAL FACE TIME SPENT:  25 minutes, more than 50% of the time spent in counseling and coordination of care.         YVES PEREZ MD             D: 2020   T: 2020   MT: EMANUEL      Name:     DUGLAS CASTILLO   MRN:      [id number]        Account:      [id number]   :      1935           Visit Date:   2020      Document: [id number]       Again, thank you for allowing me to participate in the care of your patient.        Sincerely,        Yves Perez MD

## 2020-05-07 NOTE — PATIENT INSTRUCTIONS
1. Continue revlimid and dexamethasone. Reduce dexamethasone to 4 mg every week.  2. Continue Zometa every 3 months.  3. Take advil as needed.  4. See me in 1 month.    Patient prefers a call to schedule

## 2020-05-07 NOTE — PROGRESS NOTES
"Oncology Rooming Note    May 7, 2020 1:38 PM   Quiana Dunaway is a 84 year old female who presents for:    Chief Complaint   Patient presents with     Oncology Clinic Visit     Initial Vitals: /82   Pulse 70   Temp 98.6  F (37  C)   Resp 16   Ht 1.575 m (5' 2\")   Wt 69.8 kg (153 lb 12.8 oz)   SpO2 95%   BMI 28.13 kg/m   Estimated body mass index is 28.13 kg/m  as calculated from the following:    Height as of this encounter: 1.575 m (5' 2\").    Weight as of this encounter: 69.8 kg (153 lb 12.8 oz). Body surface area is 1.75 meters squared.  Moderate Pain (5) Comment: Data Unavailable   No LMP recorded. Patient is postmenopausal.  Allergies reviewed: Yes  Medications reviewed: Yes    Medications: Medication refills not needed today.  Pharmacy name entered into EPIC:    [id number] - [place], [place] - [address] AT [facility] [address]  [id number] - [place], [place] - [address] [facility] [address] [facility] - [place], [place] - [address]    Clinical concerns:  doctor was notified.      Jacquie Mcdaniel MA            "

## 2020-05-08 NOTE — PROGRESS NOTES
Visit Date:   05/07/2020     HEMATOLOGY HISTORY: Mrs. Dunaway is a lady with multiple myeloma (IgG kappa). High risk, t(14;16).  1. On 05/01/2017, WBC of 3.4, hemoglobin of 10.2 and platelet of 154.  2. SPEP on 07/07/2017 revealed M-spike of 1.8.  3. Bone marrow biopsy on 07/12/2017 reveals kappa monotypic plasma cell population consistent with multiple myeloma.  Bone marrow is 70% cellular.  Plasma cell is 50-60%.     -There is gain of chromosome 1, 5, 9, 15, and 17.  There is translocation 14;16.   4.  On 07/18/2017:  -M-spike of 1.9.   -Immunofixation reveals monoclonal IgG kappa.    -IgG level of 2970.  IgA of 15 and IgM of 175.    -Kappa free light chain of 67.7.  Lambda free light chain of 1.42.  Ratio of kappa to lambda of 47.71.    -Beta 2 microglobulin of 3.4.   5. PET scan on 07/24/2017 reveals several focal areas of increased FDG uptake consistent with multiple myeloma.  There is probable epithelial cyst in tail of the pancreas.  No associated abnormal FDG activity.  Left thyroid cysts or nodules without any FDG activity.  There is a 0.3 cm left upper lobe lung nodule.   8.  Velcade, Revlimid and dexamethasone between on 08/14/2017 and 04/30/2018. Velcade stopped.   -Revlimid and dexamethasone continued.     SUBJECTIVE:  Ms. Dunaway is an 84-year-old female with high-risk multiple myeloma.  She is on Revlimid and dexamethasone.  She is taking dexamethasone 8 mg weekly.      Lately, she has been more tired.  The patient also gets short of breath on minimal exertion.  She does have cardiac disease and follows up with Cardiology.      No headache.  Intermittent dizziness.  No ear pain or sore throat.  No neck pain.  No chest pain.  No abdominal pain, nausea or vomiting.  Appetite is fairly good.  No urinary complaints.  Occasional constipation.  She gets intermittent back pain.  Back pain is more with activities.  She takes Aleve which helps.  No radiation of pain to the lower extremities.  No numbness or  tingling in the lower extremities.      PHYSICAL EXAMINATION:   GENERAL:  Alert and oriented x 3.   VITAL SIGNS:  Reviewed.  ECOG PS of 2.     EYES:  No icterus.   THROAT:  No ulcer. No thrush.   NECK:  Supple. No lymphadenopathy. No thyromegaly.   AXILLAE:  No lymphadenopathy.   LUNGS:  Good air entry bilaterally.  No crackles or wheezing.   HEART:  Regular.  No murmur.   GI: Abdomen is soft.  Nontender. No mass. Difficult palpation because of her weight.  EXTREMITIES:  No pedal edema.  No calf swelling or tenderness.   SKIN:  No rash.     LABORATORY DATA:  Reviewed.      ASSESSMENT:   1.  An 84-year-old female with high-risk multiple myeloma on Revlimid and dexamethasone.  She is in partial remission.   2.  Shortness of breath on exertion.   3.  Back pain from arthritis. Could also be due to myeloma.   4.  Fatigue due to her age, myeloma, Revlimid, and shortness of breath.      PLAN:   1.  I discussed regarding myeloma.  She is stable from that.  She will continue on Revlimid and dexamethasone.  We will reduce the dexamethasone to 4 mg weekly.  She is agreeable for it.  With regards to myeloma, I will see her in 1 months' time.  Advised her to call us with any questions or concerns.   2.  Discussed regarding shortness of breath.  I am worried about cardiac problem causing it.  We will get an echocardiogram.  She is agreeable for it.   3.  Discussed with her regarding back pain.  If it gets worse, we will get imaging studies.  MRI cannot be done as she has a pacemaker.  As her pain is mild and intermittent, she will continue with pain medication as needed.   4.  She will continue on Zometa every 3 months.   5.  I will see her in a month.  Advised her to call us with any questions or concerns.      TOTAL FACE TIME SPENT:  25 minutes, more than 50% of the time spent in counseling and coordination of care.         YVES PEREZ MD             D: 05/07/2020   T: 05/07/2020   MT: EMANUEL      Name:     DUGLAS CASTILLO    MRN:      [id number]        Account:      [id number]   :      1935           Visit Date:   2020      Document: [id number]

## 2020-05-17 NOTE — PROGRESS NOTES
Visit Date:   05/07/2020     HEMATOLOGY HISTORY: Mrs. Dunaway is a lady with multiple myeloma (IgG kappa). High risk, t(14;16).  1. On 05/01/2017, WBC of 3.4, hemoglobin of 10.2 and platelet of 154.  2. SPEP on 07/07/2017 revealed M-spike of 1.8.  3. Bone marrow biopsy on 07/12/2017 reveals kappa monotypic plasma cell population consistent with multiple myeloma.  Bone marrow is 70% cellular.  Plasma cell is 50-60%.     -There is gain of chromosome 1, 5, 9, 15, and 17.  There is translocation 14;16.   4.  On 07/18/2017:  -M-spike of 1.9.   -Immunofixation reveals monoclonal IgG kappa.    -IgG level of 2970.  IgA of 15 and IgM of 175.    -Kappa free light chain of 67.7.  Lambda free light chain of 1.42.  Ratio of kappa to lambda of 47.71.    -Beta 2 microglobulin of 3.4.   5. PET scan on 07/24/2017 reveals several focal areas of increased FDG uptake consistent with multiple myeloma.  There is probable epithelial cyst in tail of the pancreas.  No associated abnormal FDG activity.  Left thyroid cysts or nodules without any FDG activity.  There is a 0.3 cm left upper lobe lung nodule.   8.  Velcade, Revlimid and dexamethasone between on 08/14/2017 and 04/30/2018. Velcade stopped.   -Revlimid and dexamethasone continued.     SUBJECTIVE:  Ms. Dunaway is an 84-year-old female with high-risk multiple myeloma.  She is on Revlimid and dexamethasone.  She is taking dexamethasone 8 mg weekly.      Lately, she has been more tired.  The patient also gets short of breath on minimal exertion.  She does have cardiac disease and follows up with Cardiology.      No headache.  Intermittent dizziness.  No ear pain or sore throat.  No neck pain.  No chest pain.  No abdominal pain, nausea or vomiting.  Appetite is fairly good.  No urinary complaints.  Occasional constipation.  She gets intermittent back pain.  Back pain is more with activities.  She takes Aleve which helps.  No radiation of pain to the lower extremities.  No numbness or  tingling in the lower extremities.      PHYSICAL EXAMINATION:   GENERAL:  Alert and oriented x 3.   VITAL SIGNS:  Reviewed.  ECOG PS of 2.     EYES:  No icterus.   THROAT:  No ulcer. No thrush.   NECK:  Supple. No lymphadenopathy. No thyromegaly.   AXILLAE:  No lymphadenopathy.   LUNGS:  Good air entry bilaterally.  No crackles or wheezing.   HEART:  Regular.  No murmur.   GI: Abdomen is soft.  Nontender. No mass. Difficult palpation because of her weight.  EXTREMITIES:  No pedal edema.  No calf swelling or tenderness.   SKIN:  No rash.     LABORATORY DATA:  Reviewed.      ASSESSMENT:   1.  An 84-year-old female with high-risk multiple myeloma on Revlimid and dexamethasone.  She is in partial remission.   2.  Shortness of breath on exertion.   3.  Back pain from arthritis. Could also be due to myeloma.   4.  Fatigue due to her age, myeloma, Revlimid, and shortness of breath.      PLAN:   1.  I discussed regarding myeloma.  She is stable from that.  She will continue on Revlimid and dexamethasone.  We will reduce the dexamethasone to 4 mg weekly.  She is agreeable for it.  With regards to myeloma, I will see her in 1 months' time.  Advised her to call us with any questions or concerns.   2.  Discussed regarding shortness of breath.  I am worried about cardiac problem causing it.  We will get an echocardiogram.  She is agreeable for it.   3.  Discussed with her regarding back pain.  If it gets worse, we will get imaging studies.  MRI cannot be done as she has a pacemaker.  As her pain is mild and intermittent, she will continue with pain medication as needed.   4.  She will continue on Zometa every 3 months.   5.  I will see her in a month.  Advised her to call us with any questions or concerns.      TOTAL FACE TIME SPENT:  25 minutes, more than 50% of the time spent in counseling and coordination of care.         YVES PEREZ MD             D: 05/07/2020   T: 05/07/2020   MT: EMANUEL      Name:     DUGLAS CASTILLO    MRN:      [id number]        Account:      [id number]   :      1935           Visit Date:   2020      Document: [id number]

## 2020-05-18 RX ORDER — LENALIDOMIDE 10 MG/1
10 CAPSULE ORAL DAILY
Qty: 14 CAPSULE | Refills: 0 | Status: SHIPPED | OUTPATIENT
Start: 2020-05-18 | End: 2020-06-02

## 2020-05-18 RX ORDER — DEXAMETHASONE 4 MG/1
4 TABLET ORAL WEEKLY
Qty: 15 TABLET | Refills: 0 | Status: SHIPPED | OUTPATIENT
Start: 2020-05-18 | End: 2020-05-24

## 2020-05-24 ENCOUNTER — HOSPITAL ENCOUNTER (OUTPATIENT)
Facility: CLINIC | Age: 85
Setting detail: OBSERVATION
Discharge: HOME | End: 2020-05-26
Attending: PHYSICIAN ASSISTANT | Admitting: HOSPITALIST
Payer: MEDICARE

## 2020-05-24 ENCOUNTER — NURSE TRIAGE (OUTPATIENT)
Dept: FAMILY MEDICINE | Facility: CLINIC | Age: 85
End: 2020-05-24

## 2020-05-24 ENCOUNTER — APPOINTMENT (OUTPATIENT)
Dept: CT IMAGING | Facility: CLINIC | Age: 85
End: 2020-05-24
Attending: PHYSICIAN ASSISTANT
Payer: MEDICARE

## 2020-05-24 ENCOUNTER — APPOINTMENT (OUTPATIENT)
Dept: ULTRASOUND IMAGING | Facility: CLINIC | Age: 85
End: 2020-05-24
Attending: PHYSICIAN ASSISTANT
Payer: MEDICARE

## 2020-05-24 DIAGNOSIS — R79.89 ELEVATED TROPONIN: ICD-10-CM

## 2020-05-24 DIAGNOSIS — I10 BENIGN ESSENTIAL HYPERTENSION: Primary | ICD-10-CM

## 2020-05-24 DIAGNOSIS — R06.02 SHORTNESS OF BREATH: ICD-10-CM

## 2020-05-24 LAB
ALBUMIN SERPL-MCNC: 2.9 G/DL (ref 3.4–5)
ALP SERPL-CCNC: 73 U/L (ref 40–150)
ALT SERPL W P-5'-P-CCNC: 24 U/L (ref 0–50)
ANION GAP SERPL CALCULATED.3IONS-SCNC: 5 MMOL/L (ref 3–14)
AST SERPL W P-5'-P-CCNC: 18 U/L (ref 0–45)
BASOPHILS # BLD AUTO: 0 10E9/L (ref 0–0.2)
BASOPHILS NFR BLD AUTO: 0.2 %
BILIRUB SERPL-MCNC: 0.5 MG/DL (ref 0.2–1.3)
BUN SERPL-MCNC: 20 MG/DL (ref 7–30)
CALCIUM SERPL-MCNC: 7.9 MG/DL (ref 8.5–10.1)
CHLORIDE SERPL-SCNC: 114 MMOL/L (ref 94–109)
CO2 SERPL-SCNC: 25 MMOL/L (ref 20–32)
CREAT SERPL-MCNC: 0.83 MG/DL (ref 0.52–1.04)
D DIMER PPP FEU-MCNC: 1.6 UG/ML FEU (ref 0–0.5)
DIFFERENTIAL METHOD BLD: NORMAL
EOSINOPHIL # BLD AUTO: 0.1 10E9/L (ref 0–0.7)
EOSINOPHIL NFR BLD AUTO: 2.8 %
ERYTHROCYTE [DISTWIDTH] IN BLOOD BY AUTOMATED COUNT: 13.3 % (ref 10–15)
GFR SERPL CREATININE-BSD FRML MDRD: 65 ML/MIN/{1.73_M2}
GLUCOSE SERPL-MCNC: 129 MG/DL (ref 70–99)
HCT VFR BLD AUTO: 36 % (ref 35–47)
HGB BLD-MCNC: 11.9 G/DL (ref 11.7–15.7)
IMM GRANULOCYTES # BLD: 0 10E9/L (ref 0–0.4)
IMM GRANULOCYTES NFR BLD: 0.2 %
LYMPHOCYTES # BLD AUTO: 0.9 10E9/L (ref 0.8–5.3)
LYMPHOCYTES NFR BLD AUTO: 21.4 %
MCH RBC QN AUTO: 29.9 PG (ref 26.5–33)
MCHC RBC AUTO-ENTMCNC: 33.1 G/DL (ref 31.5–36.5)
MCV RBC AUTO: 91 FL (ref 78–100)
MONOCYTES # BLD AUTO: 0.8 10E9/L (ref 0–1.3)
MONOCYTES NFR BLD AUTO: 18.2 %
NEUTROPHILS # BLD AUTO: 2.5 10E9/L (ref 1.6–8.3)
NEUTROPHILS NFR BLD AUTO: 57.2 %
NRBC # BLD AUTO: 0 10*3/UL
NRBC BLD AUTO-RTO: 0 /100
NT-PROBNP SERPL-MCNC: 201 PG/ML (ref 0–1800)
PLATELET # BLD AUTO: 176 10E9/L (ref 150–450)
POTASSIUM SERPL-SCNC: 3.3 MMOL/L (ref 3.4–5.3)
PROT SERPL-MCNC: 5.8 G/DL (ref 6.8–8.8)
RBC # BLD AUTO: 3.98 10E12/L (ref 3.8–5.2)
SODIUM SERPL-SCNC: 144 MMOL/L (ref 133–144)
TROPONIN I SERPL-MCNC: 0.07 UG/L (ref 0–0.04)
WBC # BLD AUTO: 4.3 10E9/L (ref 4–11)

## 2020-05-24 PROCEDURE — 99220 ZZC INITIAL OBSERVATION CARE,LEVL III: CPT | Performed by: HOSPITALIST

## 2020-05-24 PROCEDURE — 85379 FIBRIN DEGRADATION QUANT: CPT | Performed by: PHYSICIAN ASSISTANT

## 2020-05-24 PROCEDURE — 93971 EXTREMITY STUDY: CPT | Mod: LT,CS

## 2020-05-24 PROCEDURE — 25000128 H RX IP 250 OP 636: Performed by: PHYSICIAN ASSISTANT

## 2020-05-24 PROCEDURE — 99285 EMERGENCY DEPT VISIT HI MDM: CPT | Mod: 25

## 2020-05-24 PROCEDURE — 84484 ASSAY OF TROPONIN QUANT: CPT | Performed by: PHYSICIAN ASSISTANT

## 2020-05-24 PROCEDURE — 85025 COMPLETE CBC W/AUTO DIFF WBC: CPT | Performed by: PHYSICIAN ASSISTANT

## 2020-05-24 PROCEDURE — 83880 ASSAY OF NATRIURETIC PEPTIDE: CPT | Performed by: PHYSICIAN ASSISTANT

## 2020-05-24 PROCEDURE — 25000125 ZZHC RX 250: Performed by: PHYSICIAN ASSISTANT

## 2020-05-24 PROCEDURE — 80053 COMPREHEN METABOLIC PANEL: CPT | Performed by: PHYSICIAN ASSISTANT

## 2020-05-24 PROCEDURE — 71275 CT ANGIOGRAPHY CHEST: CPT

## 2020-05-24 PROCEDURE — 93005 ELECTROCARDIOGRAM TRACING: CPT

## 2020-05-24 RX ORDER — ASPIRIN 81 MG/1
324 TABLET, CHEWABLE ORAL ONCE
Status: COMPLETED | OUTPATIENT
Start: 2020-05-24 | End: 2020-05-25

## 2020-05-24 RX ORDER — IOPAMIDOL 755 MG/ML
59 INJECTION, SOLUTION INTRAVASCULAR ONCE
Status: COMPLETED | OUTPATIENT
Start: 2020-05-24 | End: 2020-05-24

## 2020-05-24 RX ADMIN — SODIUM CHLORIDE 86 ML: 9 INJECTION, SOLUTION INTRAVENOUS at 22:22

## 2020-05-24 RX ADMIN — IOPAMIDOL 59 ML: 755 INJECTION, SOLUTION INTRAVENOUS at 22:22

## 2020-05-24 ASSESSMENT — ENCOUNTER SYMPTOMS
HEADACHES: 0
SHORTNESS OF BREATH: 1
FEVER: 0
WEAKNESS: 0
DIFFICULTY URINATING: 0
NUMBNESS: 0

## 2020-05-24 ASSESSMENT — MIFFLIN-ST. JEOR: SCORE: 1104.53

## 2020-05-25 ENCOUNTER — APPOINTMENT (OUTPATIENT)
Dept: CARDIOLOGY | Facility: CLINIC | Age: 85
End: 2020-05-25
Attending: HOSPITALIST
Payer: MEDICARE

## 2020-05-25 ENCOUNTER — DOCUMENTATION ONLY (OUTPATIENT)
Dept: CARDIOLOGY | Facility: CLINIC | Age: 85
End: 2020-05-25

## 2020-05-25 LAB
INTERPRETATION ECG - MUSE: NORMAL
MAGNESIUM SERPL-MCNC: 1.9 MG/DL (ref 1.6–2.3)
POTASSIUM SERPL-SCNC: 3.5 MMOL/L (ref 3.4–5.3)
SARS-COV-2 PCR COMMENT: NORMAL
SARS-COV-2 RNA SPEC QL NAA+PROBE: NEGATIVE
SARS-COV-2 RNA SPEC QL NAA+PROBE: NORMAL
SPECIMEN SOURCE: NORMAL
SPECIMEN SOURCE: NORMAL
TROPONIN I SERPL-MCNC: 0.04 UG/L (ref 0–0.04)
TROPONIN I SERPL-MCNC: 0.05 UG/L (ref 0–0.04)
TROPONIN I SERPL-MCNC: 0.07 UG/L (ref 0–0.04)

## 2020-05-25 PROCEDURE — 83735 ASSAY OF MAGNESIUM: CPT | Performed by: HOSPITALIST

## 2020-05-25 PROCEDURE — 36415 COLL VENOUS BLD VENIPUNCTURE: CPT | Performed by: HOSPITALIST

## 2020-05-25 PROCEDURE — 99225 ZZC SUBSEQUENT OBSERVATION CARE,LEVEL II: CPT | Performed by: HOSPITALIST

## 2020-05-25 PROCEDURE — 25000128 H RX IP 250 OP 636: Performed by: HOSPITALIST

## 2020-05-25 PROCEDURE — 96375 TX/PRO/DX INJ NEW DRUG ADDON: CPT | Mod: XS

## 2020-05-25 PROCEDURE — 25000132 ZZH RX MED GY IP 250 OP 250 PS 637: Mod: GY | Performed by: HOSPITALIST

## 2020-05-25 PROCEDURE — G0378 HOSPITAL OBSERVATION PER HR: HCPCS

## 2020-05-25 PROCEDURE — 25000132 ZZH RX MED GY IP 250 OP 250 PS 637: Mod: GY | Performed by: PHYSICIAN ASSISTANT

## 2020-05-25 PROCEDURE — 96374 THER/PROPH/DIAG INJ IV PUSH: CPT | Mod: XS

## 2020-05-25 PROCEDURE — 84484 ASSAY OF TROPONIN QUANT: CPT | Mod: 91 | Performed by: HOSPITALIST

## 2020-05-25 PROCEDURE — 25000131 ZZH RX MED GY IP 250 OP 636 PS 637: Mod: GY | Performed by: INTERNAL MEDICINE

## 2020-05-25 PROCEDURE — 93306 TTE W/DOPPLER COMPLETE: CPT

## 2020-05-25 PROCEDURE — 84484 ASSAY OF TROPONIN QUANT: CPT | Performed by: PHYSICIAN ASSISTANT

## 2020-05-25 PROCEDURE — 84132 ASSAY OF SERUM POTASSIUM: CPT | Performed by: HOSPITALIST

## 2020-05-25 PROCEDURE — 25000132 ZZH RX MED GY IP 250 OP 250 PS 637: Mod: GY | Performed by: INTERNAL MEDICINE

## 2020-05-25 PROCEDURE — 93306 TTE W/DOPPLER COMPLETE: CPT | Mod: 26 | Performed by: INTERNAL MEDICINE

## 2020-05-25 PROCEDURE — 99214 OFFICE O/P EST MOD 30 MIN: CPT | Mod: 25 | Performed by: INTERNAL MEDICINE

## 2020-05-25 PROCEDURE — 93005 ELECTROCARDIOGRAM TRACING: CPT

## 2020-05-25 PROCEDURE — U0003 INFECTIOUS AGENT DETECTION BY NUCLEIC ACID (DNA OR RNA); SEVERE ACUTE RESPIRATORY SYNDROME CORONAVIRUS 2 (SARS-COV-2) (CORONAVIRUS DISEASE [COVID-19]), AMPLIFIED PROBE TECHNIQUE, MAKING USE OF HIGH THROUGHPUT TECHNOLOGIES AS DESCRIBED BY CMS-2020-01-R: HCPCS | Performed by: PHYSICIAN ASSISTANT

## 2020-05-25 RX ORDER — MAGNESIUM SULFATE HEPTAHYDRATE 40 MG/ML
4 INJECTION, SOLUTION INTRAVENOUS EVERY 4 HOURS PRN
Status: DISCONTINUED | OUTPATIENT
Start: 2020-05-25 | End: 2020-05-26 | Stop reason: HOSPADM

## 2020-05-25 RX ORDER — MAGNESIUM SULFATE HEPTAHYDRATE 40 MG/ML
2 INJECTION, SOLUTION INTRAVENOUS DAILY PRN
Status: DISCONTINUED | OUTPATIENT
Start: 2020-05-25 | End: 2020-05-26 | Stop reason: HOSPADM

## 2020-05-25 RX ORDER — LANOLIN ALCOHOL/MO/W.PET/CERES
3 CREAM (GRAM) TOPICAL
Status: DISCONTINUED | OUTPATIENT
Start: 2020-05-25 | End: 2020-05-26 | Stop reason: HOSPADM

## 2020-05-25 RX ORDER — ACETAMINOPHEN 650 MG/1
650 SUPPOSITORY RECTAL EVERY 4 HOURS PRN
Status: DISCONTINUED | OUTPATIENT
Start: 2020-05-25 | End: 2020-05-26 | Stop reason: HOSPADM

## 2020-05-25 RX ORDER — FUROSEMIDE 10 MG/ML
20 INJECTION INTRAMUSCULAR; INTRAVENOUS ONCE
Status: COMPLETED | OUTPATIENT
Start: 2020-05-25 | End: 2020-05-25

## 2020-05-25 RX ORDER — ASPIRIN 81 MG/1
162 TABLET, CHEWABLE ORAL ONCE
Status: DISCONTINUED | OUTPATIENT
Start: 2020-05-25 | End: 2020-05-26

## 2020-05-25 RX ORDER — ACETAMINOPHEN 325 MG/1
650 TABLET ORAL EVERY 4 HOURS PRN
Status: DISCONTINUED | OUTPATIENT
Start: 2020-05-25 | End: 2020-05-26 | Stop reason: HOSPADM

## 2020-05-25 RX ORDER — POTASSIUM CHLORIDE 1500 MG/1
20-40 TABLET, EXTENDED RELEASE ORAL
Status: DISCONTINUED | OUTPATIENT
Start: 2020-05-25 | End: 2020-05-26 | Stop reason: HOSPADM

## 2020-05-25 RX ORDER — POTASSIUM CL/LIDO/0.9 % NACL 10MEQ/0.1L
10 INTRAVENOUS SOLUTION, PIGGYBACK (ML) INTRAVENOUS
Status: DISCONTINUED | OUTPATIENT
Start: 2020-05-25 | End: 2020-05-26 | Stop reason: HOSPADM

## 2020-05-25 RX ORDER — POTASSIUM CHLORIDE 1.5 G/1.58G
20-40 POWDER, FOR SOLUTION ORAL
Status: DISCONTINUED | OUTPATIENT
Start: 2020-05-25 | End: 2020-05-26 | Stop reason: HOSPADM

## 2020-05-25 RX ORDER — LIDOCAINE 40 MG/G
CREAM TOPICAL
Status: DISCONTINUED | OUTPATIENT
Start: 2020-05-25 | End: 2020-05-26 | Stop reason: HOSPADM

## 2020-05-25 RX ORDER — NITROGLYCERIN 0.4 MG/1
0.4 TABLET SUBLINGUAL EVERY 5 MIN PRN
Status: DISCONTINUED | OUTPATIENT
Start: 2020-05-25 | End: 2020-05-26 | Stop reason: HOSPADM

## 2020-05-25 RX ORDER — DEXAMETHASONE 4 MG/1
4 TABLET ORAL WEEKLY
Status: DISCONTINUED | OUTPATIENT
Start: 2020-05-25 | End: 2020-05-26 | Stop reason: HOSPADM

## 2020-05-25 RX ORDER — NALOXONE HYDROCHLORIDE 0.4 MG/ML
.1-.4 INJECTION, SOLUTION INTRAMUSCULAR; INTRAVENOUS; SUBCUTANEOUS
Status: DISCONTINUED | OUTPATIENT
Start: 2020-05-25 | End: 2020-05-26 | Stop reason: HOSPADM

## 2020-05-25 RX ORDER — ACYCLOVIR 400 MG/1
400 TABLET ORAL 2 TIMES DAILY
Status: DISCONTINUED | OUTPATIENT
Start: 2020-05-25 | End: 2020-05-26 | Stop reason: HOSPADM

## 2020-05-25 RX ORDER — ALUMINA, MAGNESIA, AND SIMETHICONE 2400; 2400; 240 MG/30ML; MG/30ML; MG/30ML
30 SUSPENSION ORAL EVERY 4 HOURS PRN
Status: DISCONTINUED | OUTPATIENT
Start: 2020-05-25 | End: 2020-05-26 | Stop reason: HOSPADM

## 2020-05-25 RX ORDER — POTASSIUM CHLORIDE 29.8 MG/ML
20 INJECTION INTRAVENOUS
Status: DISCONTINUED | OUTPATIENT
Start: 2020-05-25 | End: 2020-05-26 | Stop reason: HOSPADM

## 2020-05-25 RX ORDER — POTASSIUM CHLORIDE 7.45 MG/ML
10 INJECTION INTRAVENOUS
Status: DISCONTINUED | OUTPATIENT
Start: 2020-05-25 | End: 2020-05-26 | Stop reason: HOSPADM

## 2020-05-25 RX ORDER — LISINOPRIL 10 MG/1
10 TABLET ORAL DAILY
Status: DISCONTINUED | OUTPATIENT
Start: 2020-05-25 | End: 2020-05-26 | Stop reason: HOSPADM

## 2020-05-25 RX ORDER — ASPIRIN 81 MG/1
81 TABLET ORAL DAILY
Status: DISCONTINUED | OUTPATIENT
Start: 2020-05-25 | End: 2020-05-26 | Stop reason: HOSPADM

## 2020-05-25 RX ADMIN — ASPIRIN 81 MG 324 MG: 81 TABLET ORAL at 00:33

## 2020-05-25 RX ADMIN — POTASSIUM CHLORIDE 40 MEQ: 1500 TABLET, EXTENDED RELEASE ORAL at 05:05

## 2020-05-25 RX ADMIN — FUROSEMIDE 20 MG: 10 INJECTION, SOLUTION INTRAMUSCULAR; INTRAVENOUS at 02:35

## 2020-05-25 RX ADMIN — POTASSIUM CHLORIDE 20 MEQ: 1500 TABLET, EXTENDED RELEASE ORAL at 06:58

## 2020-05-25 RX ADMIN — LISINOPRIL 10 MG: 10 TABLET ORAL at 16:18

## 2020-05-25 RX ADMIN — MAGNESIUM SULFATE HEPTAHYDRATE 2 G: 40 INJECTION, SOLUTION INTRAVENOUS at 08:05

## 2020-05-25 RX ADMIN — DEXAMETHASONE 4 MG: 4 TABLET ORAL at 19:02

## 2020-05-25 RX ADMIN — ASPIRIN 81 MG: 81 TABLET, DELAYED RELEASE ORAL at 07:59

## 2020-05-25 RX ADMIN — ACYCLOVIR 400 MG: 400 TABLET ORAL at 21:40

## 2020-05-25 ASSESSMENT — MIFFLIN-ST. JEOR
SCORE: 1121.76
SCORE: 1102.26

## 2020-05-25 NOTE — ED NOTES
[facility]  ED Nurse Handoff Report    ED Chief complaint: Hypertension      ED Diagnosis:   Final diagnoses:   None       Code Status: To be addressed by admitting MD.     Allergies:   Allergies   Allergen Reactions     Levaquin [Levofloxacin] Other (See Comments)     Tingling in feet after 1 dose on 8/7/19         Patient Story: Pt presents to the ED for hypertension. Pt reports blood pressures 200 systolic today.   Focused Assessment:  Pt's blood pressure in /67 currently. Pt's troponin and d-dimer elevated in ED. Pt reports left leg swelling. US and chest CT completed in ED. Pt's daughter reports pt confused today. ED provider aware. Pt is alert and orientedx4 in ED. Pt being admitted for SOB and elevated troponin.  Update Son-in-law with disposition details.     Treatments and/or interventions provided: Labs, US, CT   Patient's response to treatments and/or interventions:     To be done/followed up on inpatient unit: Inpatient orders    Does this patient have any cognitive concerns?: Pt's daughter reports that she feels her mom is a little confused. Pt does repeat questions at times.    Activity level - Baseline/Home:  Independent  Activity Level - Current:   Independent    Patient's Preferred language: English   Needed?: No    Isolation: None  Infection: Not Applicable  Bariatric?: No    Vital Signs:   Vitals:    05/24/20 2110 05/24/20 2120 05/24/20 2130 05/24/20 2140   BP:  (!) 146/55 138/60    Pulse:   67    Resp: 20 21 28 20   Temp:       TempSrc:       SpO2: 94% 95% 95% 95%   Weight:       Height:           Cardiac Rhythm:     Was the PSS-3 completed:   Yes  What interventions are required if any?               Family Comments: Please update family. See note with numbers.   OBS brochure/video discussed/provided to patient/family: N/A              Name of person given brochure if not patient: NA              Relationship to patient: NA    For the majority of the shift this  patient's behavior was Green.   Behavioral interventions performed were Frequent rounding.    ED NURSE PHONE NUMBER: [phone]

## 2020-05-25 NOTE — PROGRESS NOTES
Patient A&Ox4, up to the bathroom with SBA and uses a cane. Lasix was given and diuresis well. Trops are trending down. Covid test negative . Potassium replaced

## 2020-05-25 NOTE — PLAN OF CARE
Pt A/O, up in room independently with cane. VSS on RA, ex: hypertensive and PTA Lisinopril ordered. Tele: 100% A-paced. Pt denies pain. Mild PORTILLO after ambulating in hallway. ECHO done. Plan for Lexiscan tomorrow. Called and updated granddaughter Suzan.

## 2020-05-25 NOTE — PROGRESS NOTES
Melissa and Magali called  about the  results of Interrogation of pacemaker dual chamber pacemaker. The battery placed Oct 16, 2018 it has 6 1/2 years battery life. 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 They will be faxing the results to observation..

## 2020-05-25 NOTE — PROVIDER NOTIFICATION
MD Notification  Notified Person Name: Dr. Ray    Notification Date/Time: May 25, 2020 @0074    Notification Interaction: text page    Purpose of Notification: Pt would like to take PTA meds Acyclovir and Dexamethasone tonight. Can you please order? Thanks!    Orders Received: orders placed for meds

## 2020-05-25 NOTE — TELEPHONE ENCOUNTER
Patient and daughter calling due to swollen left leg/ankle. No pain or redness.  Tingling in both hands while waiting on hold but not now.  BP is also up.  Patient a little anxious.  Denies acute neurological/cardiac symptoms, dyspnea, pain.       190/80  200/90  173/80    Patient says she has taken an extra Lisinopril in the past for higher BP but that was a long time ago.      Patient should be evaluated within four hours to r/o DVT per care guidelines.  They are reluctant to go in to ER, and are asking if they can try an extra Lisinopril first.    Paged on call provider Dr. Simon who said patient may take an extra Lisinopril 10 mg tonight but should at the very least have an  telephone visit.  If swelling is significantly more on left leg she should go to ER.      Patient should check BP twice a day.  If it continues to be high,  she should follow up with her PCP for possible dose adjustments.    Called patient back to discuss and patient and daughter decided to go in to the ER.    Hillary Anderson RN  [facility]      Additional Information    Negative: Severe difficulty breathing (e.g., struggling for each breath, speaks in single words)    Negative: Looks like a broken bone or dislocated joint (e.g., crooked or deformed)    Negative: Sounds like a life-threatening emergency to the triager    Negative: Difficulty breathing at rest    Negative: Entire foot is cool or blue in comparison to other side    Negative: [1] Can't walk or can barely walk AND [2] new onset    Negative: [1] Difficulty breathing with exertion (e.g., walking) AND [2] new onset or worsening    Negative: [1] Red area or streak AND [2] fever    Negative: [1] Swelling is painful to touch AND [2] fever    Negative: [1] Cast on leg or ankle AND [2] now increased pain    Negative: Patient sounds very sick or weak to the triager    Negative: SEVERE leg swelling (e.g., swelling extends above knee, entire leg is swollen, weeping  fluid)    Negative: [1] Red area or streak [2] large (> 2 in. or 5 cm)    Negative: [1] Thigh or calf pain AND [2] only 1 side AND [3] present > 1 hour    [1] Thigh, calf, or ankle swelling AND [2] only 1 side    Negative: Difficult to awaken or acting confused (e.g., disoriented, slurred speech)    Negative: Severe difficulty breathing (e.g., struggling for each breath, speaks in single words)    Negative: [1] Weakness of the face, arm or leg on one side of the body AND [2] new onset    Negative: [1] Numbness (i.e., loss of sensation) of the face, arm or leg on one side of the body AND [2] new onset    Negative: [1] Chest pain lasts > 5 minutes AND [2] history of heart disease  (i.e., heart attack, bypass surgery, angina, angioplasty, CHF)    Negative: [1] Chest pain AND [2] took nitrogylcerin AND [3] pain was not relieved    Negative: Sounds like a life-threatening emergency to the triager    Negative: [1] Systolic BP  >= 160 OR Diastolic >= 100 AND [2] cardiac or neurologic symptoms (e.g., chest pain, difficulty breathing, unsteady gait, blurred vision)    Negative: [1] Pregnant > 20 weeks (or postpartum < 6 weeks) AND [2] new hand or face swelling    Negative: [1] Pregnant > 20 weeks AND [2] BP Systolic BP  >= 140 OR Diastolic >= 90    Negative: [1] Systolic BP  >= 200 OR Diastolic >= 120  AND [2] having NO cardiac or neurologic symptoms    Negative: [1] Postpartum < 6 weeks AND [2] BP Systolic BP  >= 140 OR Diastolic >= 90    Negative: [1] Systolic BP  >= 180 OR Diastolic >= 110 AND [2] missed most recent dose of blood pressure medication    Protocols used: LEG SWELLING AND EDEMA-A-AH, HIGH BLOOD PRESSURE-A-AH

## 2020-05-25 NOTE — H&P
[facility]    History and Physical  Hospitalist       Date of Admission:  5/24/2020  Date of Service (when I saw the patient): 05/24/20    ASSESSMENT  Quiana Dunaway is a markedly pleasant 84 year old woman with past history that is most significant for Multiple Myeloma, Hypertension, high grade AV block status post pacemaker placement, and paroxysmal atrial fibrillation; who presents with exertional dyspnea and is found to have accelerated hypertension and myonecrosis.    PLAN    1) Dyspnea: She presents for progressive exertional dyspnea and leg swelling. She does not have known history of CAD. A Lexiscan in 2017 showed no evidence of inducible ischemia and TTE that year showed preserved LVEF with mild to moderate MR and no wall motion abnormalities. PFT tests in 2017 were also done and showed only mild diffusion impairment. Tonight she has mild myonecrosis, and a CTPA shows possible interstitial edema. She could have acute diastolic CHF exacerbation due to accelerated hypertension, causing demand ischemia. She could have progressive MR. We will rule out ACS or occult arrythmia. The CT was negative for PE or infiltrate, and reactive airways disease seems less likely clinically at this time.    -- Observation, Telemetry, serial enzymes, TTE. Cardiology consulted. Check EKG.    -- 20 mg IV Lasix ordered one time; assess for effect    -- Resume outpatient 10 mg Lisinopril daily when verified    -- Interrogate pacemaker    Rule Out COVID-19 infection  This patient was evaluated during a global COVID-19 pandemic, which necessitated consideration that the patient might be at risk for infection with the SARS-CoV-2 virus that causes COVID-19. Applicable protocols for evaluation were followed during the patient's care. LOW suspicion for infection.   -follow-up COVID-19 PCR test result  -droplet, contact precautions    2) Hypokalemia: Replacing. Check Magnesium and replace as well.    3) Status post  "pacemaker placement: Done in 2017 after a stress echo revealed signs of high grade AV block. Most recent device check 3/12/2020 revealed three episodes of PAT. She also reportedly has a history of paroxysmal atrial fibrillation; she is prescribed low dose aspirin for stroke prevention in the setting of her Multiple Myeloma.     4) Multiple Myeloma: Followed by Dr. Parry of  Oncology; she has taken Velcade in the past, and currently takes Revlimid and Dexamethasone.     -- Resume Zometa infusions at discharge; continue Revlimid and Dexamethasone at discharge    -- Resume BID Acyclovir when verified    5) Chronic anxiety: Resume Lexapro at discharge    Chief Complaint   Dyspnea    History is obtained from the patient and the ED physician whom I have spoken with    History of Present Illness   Quiana Dunaway is a very pleasant 84 year old woman who presents with dyspnea. This is exertional, resolving with rest; it has been present for many months now, to the point she will often get dyspnea when walking about one block. It is associated with more recent swelling of both ankles, especially the left; she saw her Oncologist 5/7/2020 and mentioned these issues and TTE was ordered but has not yet been done due to the pandemic. This evening, while at rest around 7 PM she took her blood pressure at home and found it to be very high, so she came in. Otherwise, she denies any chest pain, or change in 3 pillow orthopnea at home, or any PND, or cough, runny nose, fever, chills, sweats, change in bowel habits, or any other acute complaints.      In the ED, Blood pressure (!) 161/78, pulse 71, temperature 98.8  F (37.1  C), temperature source Oral, resp. rate 20, height 1.6 m (5' 3\"), weight 68 kg (150 lb), SpO2 95 %, not currently breastfeeding.    Initial BP in the ED was 189/68; it came down to 161/78 without intervention.    CBC showed WBC 4, HGB 12, . CMP was notable for K 3.3, Cl 114, Ca 7.9, Alb 2.9, Tprot 5.8. " "Otherwise these labs were within the normal reference range.     BNP was 201 and Troponin 0.068; EKG showed NSR without ST segment changes reportedly (not currently available for my personal review).    D Dimer was elevated at 1.6. US of LLE was negative for DVT. CTPA showed:    \"IMPRESSION:  1.  No visualized pulmonary embolism.  2.  Minimal mosaic attenuation in the lungs may be due to air trapping, edema or minimal infiltrate. No consolidation.  3.  Slight intralobular septal thickening suggesting component of interstitial edema.  4.  Stable left thyroid and low density left breast nodule.\"    She was given full strength ASA in the ED. COVID-19 PCR assay was sent.    PHYSICAL EXAM  Blood pressure (!) 161/78, pulse 71, temperature 98.8  F (37.1  C), temperature source Oral, resp. rate 20, height 1.6 m (5' 3\"), weight 68 kg (150 lb), SpO2 95 %, not currently breastfeeding.  Constitutional: Alert and oriented to person, place and time; no apparent distress  HEENT: normocephalic moist mucus membranes  Respiratory: lungs have faint crackles to auscultation bilaterally  Cardiovascular: regular S1 S2   GI: abdomen soft non tender non distended bowel sounds positive  Lymph/Hematologic: no pallor, no cervical lymphadenopathy  Skin: no rash, good turgor  Musculoskeletal: mild bilateral lower extremity edema, worse on the left  Neurologic: extra-ocular muscles intact; moves all four extremities  Psychiatric: appropriate affect, insight and judgment     DVT Prophylaxis: Pneumatic Compression Devices  Code Status: Full Code    Disposition: Expected discharge in 0-3 days    Mohit Shoemaker MD    Past Medical History    I have reviewed this patient's medical history and updated it with pertinent information if needed.   Past Medical History:   Diagnosis Date     Anxiety      HTN (hypertension)      Multiple myeloma not having achieved remission (H) 7/18/2017     Pacemaker     Placed due to symptomatic second degree AV " Block     Pancytopenia (H) 6/28/2017     Paroxysmal atrial fibrillation (H)        Past Surgical History   I have reviewed this patient's surgical history and updated it with pertinent information if needed.  Past Surgical History:   Procedure Laterality Date     ------------OTHER-------------      cataract eye surgery     BONE MARROW BIOPSY, BONE SPECIMEN, NEEDLE/TROCAR N/A 7/12/2017    Procedure: BIOPSY BONE MARROW;  BONE MARROW BIOPSY ;  Surgeon: Grace Vela MD;  Location:  GI     IMPLANT PACEMAKER  03/2017    AV block       Prior to Admission Medications   Prior to Admission Medications   Prescriptions Last Dose Informant Patient Reported? Taking?   LENalidomide (REVLIMID) 10 MG CAPS capsule   No No   Sig: Take 1 capsule (10 mg) by mouth daily for 14 days Take on Days 1 through 14 of 21-day cycle.   LENalidomide (REVLIMID) 10 MG CAPS capsule   No No   Sig: Take 1 capsule (10 mg) by mouth daily for 14 days Take on Days 1 through 14 of 21-day cycle.   LORazepam (ATIVAN) 0.5 MG tablet   Yes No   Sig: Take 1 tablet (0.5 mg) by mouth every 8 hours as needed (Anxiety, Nausea/Vomiting or Sleep)   Loperamide HCl (IMODIUM A-D PO)   Yes No   Sig: Take by mouth as needed   MYRBETRIQ 25 MG 24 hr tablet   No No   Sig: TAKE ONE TABLET BY MOUTH ONCE DAILY   Zoledronic Acid (ZOMETA IV)   Yes No   acyclovir (ZOVIRAX) 400 MG tablet   No No   Sig: Take 1 tablet (400 mg) by mouth 2 times daily Viral Prophylaxis.   albuterol (PROAIR HFA/PROVENTIL HFA/VENTOLIN HFA) 108 (90 Base) MCG/ACT inhaler   No No   Sig: Inhale 2 puffs into the lungs every 6 hours as needed for shortness of breath / dyspnea or wheezing   aspirin 81 MG chewable tablet   Yes No   Sig: Take 81 mg by mouth daily    dexamethasone (DECADRON) 4 MG tablet   No No   Sig: Take 1 tablet (4 mg) by mouth once a week Once a week with food on Days 1,8,15.   escitalopram (LEXAPRO) 5 MG tablet   No No   Sig: TAKE 1 TABLET BY MOUTH DAILY   lisinopril (ZESTRIL) 10  MG tablet   No No   Sig: TAKE 1 TABLET BY MOUTH DAILY   nitroglycerin (NITROSTAT) 0.4 MG sublingual tablet   No No   Sig: For chest pain place 1 tablet under the tongue every 5 minutes for 3 doses. If symptoms persist 5 minutes after 1st dose call 911.   prochlorperazine (COMPAZINE) 10 MG tablet   No No   Sig: Take 1 tablet (10 mg) by mouth every 6 hours as needed (Nausea/Vomiting)      Facility-Administered Medications: None     Allergies   Allergies   Allergen Reactions     Levaquin [Levofloxacin] Other (See Comments)     Tingling in feet after 1 dose on 8/7/19         Social History   I have reviewed this patient's social history and updated it with pertinent information if needed. Quiana Dunaway  reports that she has never smoked. She has never used smokeless tobacco. She reports that she does not drink alcohol or use drugs.    Family History   Family history assessed and, except as above, is non-contributory.    Family History   Problem Relation Age of Onset     Unknown/Adopted Mother      Unknown/Adopted Father        Review of Systems   The 10 point Review of Systems is negative other than noted in the HPI or here.     Primary Care Physician   César Chung    Data   Labs Ordered and Resulted from Time of ED Arrival Up to the Time of Departure from the ED   COMPREHENSIVE METABOLIC PANEL - Abnormal; Notable for the following components:       Result Value    Potassium 3.3 (*)     Chloride 114 (*)     Glucose 129 (*)     Calcium 7.9 (*)     Albumin 2.9 (*)     Protein Total 5.8 (*)     All other components within normal limits   TROPONIN I - Abnormal; Notable for the following components:    Troponin I ES 0.068 (*)     All other components within normal limits   D DIMER QUANTITATIVE - Abnormal; Notable for the following components:    D Dimer 1.6 (*)     All other components within normal limits   CBC WITH PLATELETS DIFFERENTIAL   NT PROBNP INPATIENT   TROPONIN I   COVID-19 VIRUS (CORONAVIRUS) BY PCR        Data reviewed today:  I personally reviewed the chest CT image(s) showing No PE.    Recent Results (from the past 24 hour(s))   US Lower Extremity Venous Duplex Left    Narrative    EXAM: US LOWER EXTREMITY VENOUS DUPLEX LEFT  LOCATION: [facility]  DATE/TIME: 5/24/2020 9:57 PM    INDICATION: Leg swelling.  COMPARISON: Left leg ultrasound 10/09/2017  TECHNIQUE: Venous Duplex ultrasound of the left lower extremity with and without compression, augmentation and duplex. Color flow and spectral Doppler with waveform analysis performed.    FINDINGS: Exam includes the common femoral, femoral, popliteal, and contralateral common femoral veins as well as segmentally visualized deep calf veins and greater saphenous vein.     LEFT: No deep vein thrombosis. No superficial thrombophlebitis. No popliteal cyst.      Impression    IMPRESSION:  1.  No deep venous thrombosis in the left lower extremity.   CT Chest Pulmonary Embolism w Contrast    Narrative    EXAM: CT CHEST PULMONARY EMBOLISM W CONTRAST  LOCATION: [facility]  DATE/TIME: 5/24/2020 10:21 PM    INDICATION: Hypertension. PE suspected, intermediate prob, positive D-dimer.  COMPARISON: 10/30/2019.  TECHNIQUE: CT angiogram chest during arterial phase injection IV contrast. 2D and 3D MIP reconstructions were performed by the CT technologist. Dose reduction techniques were used.   CONTRAST: 59 mL Isovue-370.    FINDINGS:  ANGIOGRAM CHEST: No visualized pulmonary embolism. Mildly tortuous, calcified thoracic aorta without aneurysm or dissection.    LUNGS AND PLEURA: Mild mosaic attenuation in the lungs. No consolidative infiltrates, pleural effusions or pneumothorax. Slight intralobular septal thickening suggesting component of interstitial edema.    MEDIASTINUM/AXILLAE: No pathologic lymph node enlargement. Stable 2.5 cm left thyroid nodule with small central calcification. Minimal coronary artery calcifications. Small ovoid low-density  nodule or cyst left medial breast 1.1 x 2.2 cm, stable. Left   chest wall cardiac device. Small hiatal hernia.    UPPER ABDOMEN: Normal.    MUSCULOSKELETAL: Kyphosis and degenerative changes thoracic spine.      Impression    IMPRESSION:  1.  No visualized pulmonary embolism.  2.  Minimal mosaic attenuation in the lungs may be due to air trapping, edema or minimal infiltrate. No consolidation.  3.  Slight intralobular septal thickening suggesting component of interstitial edema.  4.  Stable left thyroid and low density left breast nodule.

## 2020-05-25 NOTE — PROGRESS NOTES
"Hospitalist Cross Cover  5/25/2020    Notified of negative COVID-19 PCR test result. Chart reviewed. Discontinue droplet/contact isolation precautions.    BP (!) 149/51 (BP Location: Left arm)   Pulse 65   Temp 98.2  F (36.8  C) (Oral)   Resp 16   Ht 1.6 m (5' 3\")   Wt 69.8 kg (153 lb 12.8 oz)   SpO2 94%   BMI 27.24 kg/m      Mohit Shoemaker MD      "

## 2020-05-25 NOTE — PROGRESS NOTES
Comments: List all goals to be met before discharge home:   - Serial troponins and stress test complete.  Not met  - Seen and cleared by consultant if applicable  Not met  - Adequate pain control on oral analgesia  met  - Vital signs normal or at patient baseline  met  - Safe disposition plan has been identified not met  - Nurse to notify provider when observation goals have been met and patient is ready for discharge

## 2020-05-25 NOTE — CONSULTS
Electrophysiology/ Cardiology- Consult Note         H&P and Plan:     Reason for consult: Shortness of breath.    History of present illness: Ms. Dunaway is a delightful 84-year-old lady with a history of hypertension, multiple myeloma and symptomatic second-degree AV block (pacemaker implantation on 03/27/2017), who was admitted with shortness of breath.    Patient presented to the ED with complaints of shortness of breath and increasing lower extremity edema.  She informs she noticed having dyspnea on exertion increased lower extremity edema the last couple weeks.     The ED, she underwent an extensive work-up including a chest CT which ruled out PE and lower extremity duplex which was negative for DVT.  EKG did not show any significant ischemic changes but troponins were mild elevated but flat (0.068, 0.068, 0.051 and 0.041).  She she was treated with Lasix.    At the moment, she is doing well.  She denies any symptoms of chest pain, shortness of breath, lightheadedness, near-syncope or syncope.  Lower extreme edema has improved with Lasix.      Device was interrogated yesterday and lead parameters seems stable.    Of note, patient has a history of paroxysmal A. fib noticed on device interrogation in the past.  However the episodes were brief (the longest one lasting for 3 minutes and 22 seconds) and no anticoagulation was restarted due to the duration of the episodes.      Previous studies:  - Echocardiogram obtained in 07/2017 revealed EF of 60%-65% with mild to moderate MR.   -Chest CT (05/24/2020): No PE. No consolidation. Slight intralobular septal thickening suggesting component of interstitial edema. Stable left thyroid and low density left breast nodule.  - LE US (05/24/2020):  No deep venous thrombosis in the left lower extremity.     Plan:   1.    Shortness of breath.  Improved with Lasix.  Was like she had a component preserved EF heart failure.  We will change Lasix to oral and obtain an  "echocardiogram.    2.  Troponinemia.  Unclear etiology.  Symptoms are not suggestive of ischemia.  If echocardiogram showed normal cardiac function, I recommend having a Lexiscan stress test.  3.  Hypertension.  Blood pressure is well controlled.  Continue current therapy with lisinopril.       Alberto Worthington MD    Physical Exam:  Vitals: /69   Pulse 65   Temp 97  F (36.1  C) (Oral)   Resp 16   Ht 1.6 m (5' 3\")   Wt 67.8 kg (149 lb 8 oz)   SpO2 97%   BMI 26.48 kg/m        Intake/Output Summary (Last 24 hours) at 5/25/2020 1100  Last data filed at 5/25/2020 0903  Gross per 24 hour   Intake 120 ml   Output 2750 ml   Net -2630 ml     Vitals:    05/24/20 2006 05/25/20 0207 05/25/20 0600   Weight: 68 kg (150 lb) 69.8 kg (153 lb 12.8 oz) 67.8 kg (149 lb 8 oz)       Constitutional:  AAO x3.  Pt is in NAD.  HEAD: Normocephalic.  SKIN: Skin normal color, texture and turgor with no lesions or eruptions.  Eyes: PERRL, EOMI.  ENT:  Supple, normal JVP. No lymphadenopathy or thyroid enlargement.  Chest:  CTAB.  Cardiac:   RRR, normal  S1 and S2.  No murmurs rubs or gallop.    Abdomen:  Normal BS.  Soft, non-tender and non-distended.  No rebound or guarding.    Extremities:  Pedious pulses palpable B/L.  No LE edema noticed.   Neurological: Strength and sensation grossly symmetric and intact throughout.         Review of Systems:  Complete review of system is otherwise negative with the exception of what was described above.     CURRENT MEDICATIONS:    aspirin  162 mg Oral Once     aspirin  81 mg Oral Daily     sodium chloride (PF)  3 mL Intracatheter Q8H     PRN Meds: acetaminophen, acetaminophen, alum & mag hydroxide-simethicone, lidocaine 4%, lidocaine (buffered or not buffered), magnesium sulfate, magnesium sulfate, melatonin, naloxone, nitroGLYcerin, potassium chloride, potassium chloride with lidocaine, potassium chloride, potassium chloride, potassium chloride, sodium chloride (PF)    ALLERGIES     Allergies "   Allergen Reactions     Levaquin [Levofloxacin] Other (See Comments)     Tingling in feet after 1 dose on 8/7/19         PAST MEDICAL HISTORY:  Past Medical History:   Diagnosis Date     Anxiety      HTN (hypertension)      Multiple myeloma not having achieved remission (H) 7/18/2017     Pacemaker     Placed due to symptomatic second degree AV Block     Pancytopenia (H) 6/28/2017     Paroxysmal atrial fibrillation (H)        PAST SURGICAL HISTORY:  Past Surgical History:   Procedure Laterality Date     ------------OTHER-------------      cataract eye surgery     BONE MARROW BIOPSY, BONE SPECIMEN, NEEDLE/TROCAR N/A 7/12/2017    Procedure: BIOPSY BONE MARROW;  BONE MARROW BIOPSY ;  Surgeon: Grace Vela MD;  Location:  GI     IMPLANT PACEMAKER  03/2017    AV block       FAMILY HISTORY:  Family History   Problem Relation Age of Onset     Unknown/Adopted Mother      Unknown/Adopted Father        SOCIAL HISTORY:  Social History     Socioeconomic History     Marital status:      Spouse name: None     Number of children: None     Years of education: None     Highest education level: None   Occupational History     None   Social Needs     Financial resource strain: None     Food insecurity     Worry: None     Inability: None     Transportation needs     Medical: None     Non-medical: None   Tobacco Use     Smoking status: Never Smoker     Smokeless tobacco: Never Used   Substance and Sexual Activity     Alcohol use: No     Alcohol/week: 0.0 standard drinks     Drug use: No     Sexual activity: Never   Lifestyle     Physical activity     Days per week: None     Minutes per session: None     Stress: None   Relationships     Social connections     Talks on phone: None     Gets together: None     Attends [employer] service: None     Active member of club or organization: None     Attends meetings of clubs or organizations: None     Relationship status: None     Intimate partner violence     Fear of current  or ex partner: None     Emotionally abused: None     Physically abused: None     Forced sexual activity: None   Other Topics Concern     Parent/sibling w/ CABG, MI or angioplasty before 65F 55M? Not Asked   Social History Narrative     None         Recent Lab Results:  Recent Labs   Lab 05/25/20  0904 05/25/20  0430 05/25/20  0039 05/24/20  2101   WBC  --   --   --  4.3   HGB  --   --   --  11.9   MCV  --   --   --  91   PLT  --   --   --  176   NA  --   --   --  144   POTASSIUM 3.5  --   --  3.3*   CHLORIDE  --   --   --  114*   CO2  --   --   --  25   BUN  --   --   --  20   CR  --   --   --  0.83   ANIONGAP  --   --   --  5   HUMBERTO  --   --   --  7.9*   GLC  --   --   --  129*   ALBUMIN  --   --   --  2.9*   PROTTOTAL  --   --   --  5.8*   BILITOTAL  --   --   --  0.5   ALKPHOS  --   --   --  73   ALT  --   --   --  24   AST  --   --   --  18   TROPI 0.041 0.051* 0.068* 0.068*

## 2020-05-25 NOTE — PROGRESS NOTES
Observation goals  PRIOR TO DISCHARGE       List all goals to be met before discharge home:   - Serial troponins and stress test complete. Not met.  - Seen and cleared by consultant if applicable. Not met.  - Adequate pain control on oral analgesia -denies pain- met.  - Vital signs normal or at patient baseline -VSS-WNL's met.  - Safe disposition plan has been identified -not met. Patient will transfer form observation care to the heart center today 5/25/2020  - Nurse to notify provider when observation goals have been met and patient is ready for discharge.

## 2020-05-25 NOTE — ED NOTES
"Emergency Department Attending Supervision Note  5/24/2020  11:57 PM      I evaluated this patient in conjunction with ADELSO Mckoy    Briefly, the patient presented with shortness of breath, worsened with exertion, that began months ago. She has been diagnosed with hypertension though has no other history of heart disease including heart attacks, stent insertion, hyperlipidemia, or diabetes. The patient took her blood pressure at home today, noting an unusually high pressure, prompting her presentation. She otherwise denies chest pain, vomiting, diarrhea, vision changes, or headache.     On my exam,   BP (!) 149/51 (BP Location: Left arm)   Pulse 65   Temp 98.2  F (36.8  C) (Oral)   Resp 16   Ht 1.6 m (5' 3\")   Wt 69.8 kg (153 lb 12.8 oz)   SpO2 94%   BMI 27.24 kg/m    General: Alert, appears elderly, otherwise well-developed and well-nourished. Cooperative.     In mild distress  HEENT:  Head:  Atraumatic  Ears:  External ears are normal  Mouth/Throat:  Oropharynx is without erythema or exudate and mucous membranes are moist.   Eyes:   Conjunctivae normal and EOM are normal. No scleral icterus.    Pupils are equal, round, and reactive to light.   CV:  Normal rate, regular rhythm, normal heart sounds and radial pulses are 2+ and symmetric.  No murmur.  Resp:  Breath sounds are clear bilaterally    Non-labored, no retractions or accessory muscle use  GI:  Abdomen is soft, no distension, no tenderness. No rebound or guarding.  No CVA tenderness bilaterally  MS:  Normal range of motion.Trace non pitting edema to LLE.    Normal strength in all 4 extremities.     Back atraumatic.    No midline cervical, thoracic, or lumbar tenderness  Skin:  Warm and dry.  No rash or lesions noted.  Neuro: Alert. Normal strength.  Sensation intact in all 4 extremities. GCS: 15  Psych:  Normal mood and affect.    Results:  Labs Ordered and Resulted from Time of ED Arrival Up to the Time of Departure from the ED   COMPREHENSIVE " METABOLIC PANEL - Abnormal; Notable for the following components:       Result Value    Potassium 3.3 (*)     Chloride 114 (*)     Glucose 129 (*)     Calcium 7.9 (*)     Albumin 2.9 (*)     Protein Total 5.8 (*)     All other components within normal limits   TROPONIN I - Abnormal; Notable for the following components:    Troponin I ES 0.068 (*)     All other components within normal limits   D DIMER QUANTITATIVE - Abnormal; Notable for the following components:    D Dimer 1.6 (*)     All other components within normal limits   TROPONIN I - Abnormal; Notable for the following components:    Troponin I ES 0.068 (*)     All other components within normal limits   CBC WITH PLATELETS DIFFERENTIAL   NT PROBNP INPATIENT     CT Chest Pulmonary Embolism w Contrast   Final Result   IMPRESSION:   1.  No visualized pulmonary embolism.   2.  Minimal mosaic attenuation in the lungs may be due to air trapping, edema or minimal infiltrate. No consolidation.   3.  Slight intralobular septal thickening suggesting component of interstitial edema.   4.  Stable left thyroid and low density left breast nodule.      US Lower Extremity Venous Duplex Left   Final Result   IMPRESSION:   1.  No deep venous thrombosis in the left lower extremity.      Echocardiogram Complete    (Results Pending)   Cardiac Device Check - Inpatient    (Results Pending)     My impression is:  Patient is a 84-year-old female with a history of hypertension, multiple myeloma, CKD who presents with hypertension and exertional dyspnea over the last several months.  Broad work-up was pursued here in the emergency department.  Reassuringly CT imaging of the chest shows no evidence of pulmonary embolism.  Unfortunately patient's troponin is mildly elevated and concern for potential demand ischemia versus non-ST elevation myocardial infarction.  Reassuringly EKG shows no concerning ischemic changes in comparison with prior EKGs.  Patient's blood pressure improved  without need for emergent intervention in the emergency department.  As patient has been having several months of exertional dyspnea I have lower concern that today's troponin elevation is related to a non-ST elevation myocardial infarction and rather more likely related to demand ischemia of unclear etiology.  We will not provide heparin at this time in discussion with the admitting hospitalist service.  Plan for continued serial troponin studies upon admission as well as continued cardiac monitoring.  I agree with the medical decision making and plan as described in Rodolfo Mae PA-C's ED provider note.    Diagnosis    ICD-10-CM    1. Shortness of breath  R06.02 Troponin I (now)     Symptomatic COVID-19 Virus (Coronavirus) by PCR     SARS-CoV-2 COVID-19 Virus (Coronavirus) RT-PCR     SARS-CoV-2 COVID-19 Virus (Coronavirus) RT-PCR Nasopharyngeal     Troponin I - Now then in 4 hours x 2     Magnesium     Magnesium     CANCELED: Magnesium   2. Elevated troponin  R79.89        Scribe Disclosure:  I, Luz Marina Holland, am serving as a scribe at 11:38 PM on 5/24/2020 to document services personally performed by Demarcus Ford MD based on my observations and the provider's statements to me.       Demarcus Ford MD  05/25/20 [id number]

## 2020-05-25 NOTE — PLAN OF CARE
Neuro: a/ox4    Cardiac: A paced 100% with PPM, no complaints of chest pain    Respiratory: lungs clear, room air    GI/: heart healthy diet    Activity: independent with cane    Discharge: pending    Updates:   -COVID negative  -Troponin peaked at 0.068  -Needs ECHO and cardiology consult

## 2020-05-25 NOTE — ED NOTES
Spoke with Pt's daughter (Regi [phone]). Daughter reports pt has been confused all day. Pt's daughter would like Son- in- law Francisco ([phone]) to be updated with pt condition and disposition.

## 2020-05-25 NOTE — ED NOTES
Pt repositioned in bed. Pt wondering if she was going to be discharged. Pt informed that the PA will be back to discuss her test results when the Radiologists have read them and come up with a plan.

## 2020-05-25 NOTE — PROGRESS NOTES
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      Dyspnea   T 2 MI, demand ischemia (NSTEMI technically)  She presents for progressive exertional dyspnea and leg swelling. She does not have known history of CAD. A Lexiscan in 2017 showed no evidence of inducible ischemia and TTE that year showed preserved LVEF with mild to moderate MR and no wall motion abnormalities. PFT tests in 2017 were also done and showed only mild diffusion impairment. Tonight she has mild myonecrosis, and a CTPA shows possible interstitial edema. She could have acute diastolic CHF exacerbation due to accelerated hypertension, causing demand ischemia. She could have progressive MR. We will rule out ACS or occult arrythmia. The CT was negative for PE or infiltrate, and reactive airways disease seems less likely clinically at this time.  - Telemetry, serial enzymes flat but technically elevated,   - TTE pending.   - Cardiology consulted.   - 20 mg IV Lasix x 1 on adm, PO lasix subsequently, defer to cardiology  - Resume outpatient 10 mg Lisinopril daily when verified  - Interrogated pacemaker - pending     Rule Out COVID-19 infection  This patient was evaluated during a global COVID-19 pandemic, which necessitated consideration that the patient might be at risk for infection with the SARS-CoV-2 virus that causes COVID-19. Applicable protocols for evaluation were followed during the patient's care. LOW suspicion for infection.   - COVID 19 negative     Hypokalemia  Replacing. Check Magnesium and replace as well.     Status post pacemaker  placement  Done in 2017 after a stress echo revealed signs of high grade AV block. Most recent device check 3/12/2020 revealed three episodes of PAT. She also reportedly has a history of paroxysmal atrial fibrillation; she is prescribed low dose aspirin for stroke prevention in the setting of her Multiple Myeloma.      Multiple Myeloma  Followed by Dr. Parry of  Oncology; she has taken Velcade in the past, and currently takes Revlimid and Dexamethasone.     -- Resume Zometa infusions at discharge; continue Revlimid and Dexamethasone at discharge    -- Resume BID Acyclovir when verified     Chronic anxiety  Resume Lexapro at discharge      Diet: Kosher Diet    DVT Prophylaxis: Pneumatic Compression Devices  Youssef Catheter: not present  Code Status: Full Code           Disposition Plan   Expected discharge: pending work up and evaluation with cardiology  Entered: Addison Ray MD 05/25/2020, 3:14 PM       The patient's care was discussed with the Bedside Nurse and Patient.    Addison Ray MD  Hospitalist Service  [facility]    ______________________________________________________________________    Interval History   Seen and examined.  Shortness of breath resolved.  No new complaints.  Echo and cardiology evaluation awaited at the time I saw the patient.    Data reviewed today: I reviewed all medications, new labs and imaging results over the last 24 hours. I personally reviewed no images or EKG's today.    Physical Exam   Vital Signs: Temp: 98  F (36.7  C) Temp src: Oral BP: 132/66 Pulse: 65 Heart Rate: 66 Resp: 16 SpO2: 94 % O2 Device: None (Room air)    Weight: 149 lbs 8 oz    Gen: NAD, pleasant  HEENT: Normocephalic, EOMI, MMM  Resp: no crackles,  no wheezes, no increased work of resp  CV: S1S2 heard, reg rhythm, reg rate, no pedal edema  Abdo: soft, nontender, nondistended, bowel sounds present  Ext: calves nontender, well perfused  Neuro: AAOx3, CN grossly intact, no facial  asymmetry      Data   Recent Labs   Lab 05/25/20  0904 05/25/20  0430 05/25/20  0039 05/24/20  2101   WBC  --   --   --  4.3   HGB  --   --   --  11.9   MCV  --   --   --  91   PLT  --   --   --  176   NA  --   --   --  144   POTASSIUM 3.5  --   --  3.3*   CHLORIDE  --   --   --  114*   CO2  --   --   --  25   BUN  --   --   --  20   CR  --   --   --  0.83   ANIONGAP  --   --   --  5   HUMBERTO  --   --   --  7.9*   GLC  --   --   --  129*   ALBUMIN  --   --   --  2.9*   PROTTOTAL  --   --   --  5.8*   BILITOTAL  --   --   --  0.5   ALKPHOS  --   --   --  73   ALT  --   --   --  24   AST  --   --   --  18   TROPI 0.041 0.051* 0.068* 0.068*     Recent Results (from the past 24 hour(s))   US Lower Extremity Venous Duplex Left    Narrative    EXAM: US LOWER EXTREMITY VENOUS DUPLEX LEFT  LOCATION: [facility]  DATE/TIME: 5/24/2020 9:57 PM    INDICATION: Leg swelling.  COMPARISON: Left leg ultrasound 10/09/2017  TECHNIQUE: Venous Duplex ultrasound of the left lower extremity with and without compression, augmentation and duplex. Color flow and spectral Doppler with waveform analysis performed.    FINDINGS: Exam includes the common femoral, femoral, popliteal, and contralateral common femoral veins as well as segmentally visualized deep calf veins and greater saphenous vein.     LEFT: No deep vein thrombosis. No superficial thrombophlebitis. No popliteal cyst.      Impression    IMPRESSION:  1.  No deep venous thrombosis in the left lower extremity.   CT Chest Pulmonary Embolism w Contrast    Narrative    EXAM: CT CHEST PULMONARY EMBOLISM W CONTRAST  LOCATION: [facility]  DATE/TIME: 5/24/2020 10:21 PM    INDICATION: Hypertension. PE suspected, intermediate prob, positive D-dimer.  COMPARISON: 10/30/2019.  TECHNIQUE: CT angiogram chest during arterial phase injection IV contrast. 2D and 3D MIP reconstructions were performed by the CT technologist. Dose reduction techniques were used.   CONTRAST: 59 mL  Isovue-370.    FINDINGS:  ANGIOGRAM CHEST: No visualized pulmonary embolism. Mildly tortuous, calcified thoracic aorta without aneurysm or dissection.    LUNGS AND PLEURA: Mild mosaic attenuation in the lungs. No consolidative infiltrates, pleural effusions or pneumothorax. Slight intralobular septal thickening suggesting component of interstitial edema.    MEDIASTINUM/AXILLAE: No pathologic lymph node enlargement. Stable 2.5 cm left thyroid nodule with small central calcification. Minimal coronary artery calcifications. Small ovoid low-density nodule or cyst left medial breast 1.1 x 2.2 cm, stable. Left   chest wall cardiac device. Small hiatal hernia.    UPPER ABDOMEN: Normal.    MUSCULOSKELETAL: Kyphosis and degenerative changes thoracic spine.      Impression    IMPRESSION:  1.  No visualized pulmonary embolism.  2.  Minimal mosaic attenuation in the lungs may be due to air trapping, edema or minimal infiltrate. No consolidation.  3.  Slight intralobular septal thickening suggesting component of interstitial edema.  4.  Stable left thyroid and low density left breast nodule.

## 2020-05-25 NOTE — PROGRESS NOTES
RECEIVING UNIT ED HANDOFF REVIEW    ED Nurse Handoff Report was reviewed by: Silvia López RN on May 25, 2020 at 1:10 AM

## 2020-05-25 NOTE — ED PROVIDER NOTES
History     Chief Complaint:  Hypertension, SOB, Left leg swelling.       JUSTINO Dunaway is a 84 year old female with a history of hypertension, multiple myeloma, CKD stage 3, s/p pacemaker implantation who presents for evaluation of hypertension. The patient reports a systolic pressure of 200 about one hour ago, prompting her to visit the emergency department. She states she has not checked her blood pressure in a while, and her children advised her to take her blood pressure today. The patient endorses some swelling of her lower left leg and denies any recent injuries or trauma. She also states that she is feeling slightly short of breath more so with exertion over the last several days. No fevers, chest pain, nausea, vomiting, difficulty urinating, headache, numbness or weakness of the extremities. No history of blood clots, cardiac issues, or lung problems. She endorses compliance with her blood pressure medications.  She does not have a cough.  No diarrhea.    Allergies:  Levaquin [Levofloxacin]    Medications:   Acyclovir  Albuterol inhaler  Aspirin 81 mg   Decadron  Lexapro  Revlimid  Lisinopril   Ativan  Myrbetriq  Nitrostat  Compazine  Zoledronic Acid    Past Medical History:    Hypertension  Multiple myeloma  Pacemaker in place  Pancytopenia  CKD stage 3  Generalized anxiety disorder  Bilateral cataracts  GERD  Osteopenia    Past Surgical History:    Bone marrow biopsy, bone specimen, needle/trocar  Implant pacemaker     Family History:    Mother: breast cancer  Brother: colon cancer  Sister: colon cancer    Social History:  The patient presents unaccompanied to the ED.  PCP: César Chung  Smoking status: Never Smoker  Smokeless tobacco: Never Used  Alcohol use: Negative   Drug use: Negative    Review of Systems   Constitutional: Negative for fever.   Respiratory: Positive for shortness of breath.    Cardiovascular: Positive for leg swelling. Negative for chest pain.   Genitourinary: Negative  "for difficulty urinating.   Neurological: Negative for weakness, numbness and headaches.   All other systems reviewed and are negative.    Physical Exam     Patient Vitals for the past 24 hrs:   BP Temp Temp src Pulse Heart Rate Resp SpO2 Height Weight   05/24/20 2317 (!) 161/78 -- -- 71 72 20 95 % -- --   05/24/20 2235 -- -- -- -- 75 22 97 % -- --   05/24/20 2150 (!) 153/67 -- -- -- 67 20 96 % -- --   05/24/20 2140 -- -- -- -- 66 20 95 % -- --   05/24/20 2130 138/60 -- -- 67 67 28 95 % -- --   05/24/20 2120 (!) 146/55 -- -- -- 64 21 95 % -- --   05/24/20 2110 -- -- -- -- 65 20 94 % -- --   05/24/20 2100 134/60 -- -- 64 64 17 95 % -- --   05/24/20 2030 (!) 166/69 -- -- 73 75 20 95 % -- --   05/24/20 2026 (!) 129/98 -- -- 76 -- -- 94 % -- --   05/24/20 2025 (!) 183/77 -- -- 79 -- -- -- -- --   05/24/20 2006 (!) 189/68 98.8  F (37.1  C) Oral -- 82 16 97 % 1.6 m (5' 3\") 68 kg (150 lb)       Physical Exam  General: Alert and cooperative with exam. Resting comfortably on gurney  Head:  Scalp is NC/AT  Eyes:  No scleral icterus, PERRL  ENT:  The external nose and ears are normal.   Neck:  Normal range of motion without rigidity.  CV:  Regular rate and rhythm    No pathologic murmur, rubs, or gallops.  Resp:  Breath sounds are clear bilaterally.  No crackles, wheezes, rhonchi, stridor.    Non-labored, no retractions or accessory muscle use  GI:  Abdomen is soft, no distension, no tenderness, no masses. No peritoneal signs.  Bowel sounds present in all quadrants  :  No suprapubic or flank tenderness  MS:  1+ left lower extremity edema. Normal ROM in all joints without effusions.    No midline cervical, thoracic, or lumbar tenderness  Skin:  Warm and dry, No rash or lesions noted. 2+ peripheral pulses in all extremities  Neuro: Oriented. No gross motor deficits. GCS 15  Psych: Awake. Alert. Normal affect. Appropriate interactions.     Emergency Department Course   ECG   Time: 2104  Vent. Rate 65 bpm. SC interval 180. QRS " duration 86. QT/QTc 436/453. P-R-T axis 59 -10 31.  Normal sinus rhythm   Minimal voltage criteria for LVH, may be normal variant  Borderline ECG  No significant change compared to EKG dated 10/30/2019.   Read time: Read by Dr. Helm at 2110    Imaging:  Radiographic findings were communicated with the patient who voiced understanding of the findings.    CT Chest Pulmonary Embolism w Contrast   Preliminary Result   IMPRESSION:   1.  No visualized pulmonary embolism.   2.  Minimal mosaic attenuation in the lungs may be due to air trapping, edema or minimal infiltrate. No consolidation.   3.  Slight intralobular septal thickening suggesting component of interstitial edema.   4.  Stable left thyroid and low density left breast nodule.      US Lower Extremity Venous Duplex Left   Final Result   IMPRESSION:   1.  No deep venous thrombosis in the left lower extremity.                 Laboratory:  Laboratory findings were communicated with the patient who voiced understanding of the findings.    CBC: WBC: 4.3, HGB 11.9, PLT: 176    CMP: Glucose 129 (H), Potassium 3.3 (L), Chloride 114 (H), Calcium 7.9 (L), Albumin 2.9 (L), Protein Total 5.8 (L), o/w WNL (Creatinine: 0.83)    Troponin (Collected 2101): 0.068 (H)     D-Dimer: 1.6 (H)    BNP: 201    Emergency Department Course:  Past medical records, nursing notes, and vitals reviewed.   2038 I performed an exam of the patient and obtained history, as documented above.    EKG obtained in the ED, see results above.     IV was inserted and blood was drawn for laboratory testing, results above.     The patient was sent for a US Lower Extremity Venous Duplex and CT Chest Pulmonary Embolism while in the emergency department, results above.      I rechecked and explained findings to the patient.    I spoke with Dr. duran, hospitalist, who agreed to admit the patient.     Findings and plan explained to the Patient who consents to admission. Discussed the patient with   Vel, who will admit the patient to a Observation bed for further monitoring, evaluation, and treatment.     I personally reviewed the laboratory and imaging results with the Patient and answered all related questions prior to admission.     Impression & Plan      Medical Decision Makin-year-old female who presents with elevated blood pressures at home, shortness of breath.  Broad differential was considered.  EKG shows minimal LVH criteria but otherwise normal with no acute ischemic changes or arrhythmia.  Initial troponin is mildly elevated, though no chest pain, and differential would include ACS versus possible demand ischemia from more severely elevated blood pressures earlier.  Dimer elevated, prompting CT of the chest which shows no evidence of pulmonary embolism, dissection, pneumothorax, or pneumonia.  There is questionable slight edema on CT scan though BNP is normal and clinically no evidence of congestive heart failure at this time.  Her blood pressures have been between 140 and 160 systolic here without any medication.  There was some report of possible confusion from the patient's daughter though on my exam she is alert and oriented with no neurologic deficits and no evidence of stroke, CNS process, hypertensive encephalopathy, or other hypertensive emergency.  Kidney function is at baseline.  Ultrasound of the left lower extremity is negative for DVT as the patient had reported she felt this was somewhat swollen.    She was given aspirin, and will be admitted to the hospital for serial troponins.  We will hold off on heparin at this time given the absence of any chest pain or EKG changes and will instead trend troponins.  Discussed with hospitalist Dr. Crowder who agrees to accept the patient to an observation bed.  The patient consents to admission and all questions were answered.  Diagnosis:    ICD-10-CM    1. Shortness of breath  R06.02    2. Elevated troponin  R79.89          Disposition:  Admitted to Observation.      Scribe Disclosure:  I, Michelle Esteban, am serving as a scribe at 8:29 PM on 5/24/2020 to document services personally performed by Rodolfo Mae PA-C* based on my observations and the provider's statements to me.     5/24/2020   Rodolfo Davalos PA-C  05/25/20 0008

## 2020-05-26 ENCOUNTER — APPOINTMENT (OUTPATIENT)
Dept: NUCLEAR MEDICINE | Facility: CLINIC | Age: 85
End: 2020-05-26
Attending: HOSPITALIST
Payer: MEDICARE

## 2020-05-26 ENCOUNTER — APPOINTMENT (OUTPATIENT)
Dept: CARDIOLOGY | Facility: CLINIC | Age: 85
End: 2020-05-26
Attending: HOSPITALIST
Payer: MEDICARE

## 2020-05-26 VITALS
BODY MASS INDEX: 25.91 KG/M2 | WEIGHT: 146.2 LBS | RESPIRATION RATE: 15 BRPM | SYSTOLIC BLOOD PRESSURE: 117 MMHG | HEIGHT: 63 IN | HEART RATE: 75 BPM | TEMPERATURE: 97.8 F | OXYGEN SATURATION: 94 % | DIASTOLIC BLOOD PRESSURE: 66 MMHG

## 2020-05-26 LAB
CV STRESS MAX HR HE: 92
INTERPRETATION ECG - MUSE: NORMAL
MAGNESIUM SERPL-MCNC: 2.4 MG/DL (ref 1.6–2.3)
POTASSIUM SERPL-SCNC: 4.7 MMOL/L (ref 3.4–5.3)
RATE PRESSURE PRODUCT: NORMAL
STRESS ECHO BASELINE DIASTOLIC HE: 69
STRESS ECHO BASELINE HR: 66
STRESS ECHO BASELINE SYSTOLIC BP: 116
STRESS ECHO CALCULATED PERCENT HR: 67 %
STRESS ECHO LAST STRESS DIASTOLIC BP: 61
STRESS ECHO LAST STRESS SYSTOLIC BP: 132
STRESS ECHO TARGET HR: 137
STRESS/REST PERFUSION RATIO: 0.87

## 2020-05-26 PROCEDURE — G0378 HOSPITAL OBSERVATION PER HR: HCPCS

## 2020-05-26 PROCEDURE — 25000132 ZZH RX MED GY IP 250 OP 250 PS 637: Mod: GY | Performed by: INTERNAL MEDICINE

## 2020-05-26 PROCEDURE — 93016 CV STRESS TEST SUPVJ ONLY: CPT | Performed by: INTERNAL MEDICINE

## 2020-05-26 PROCEDURE — 25000132 ZZH RX MED GY IP 250 OP 250 PS 637: Mod: GY | Performed by: HOSPITALIST

## 2020-05-26 PROCEDURE — 40000855 ZZH STATISTIC ECHO STRESS OR NM NPI

## 2020-05-26 PROCEDURE — 78452 HT MUSCLE IMAGE SPECT MULT: CPT | Mod: 26 | Performed by: INTERNAL MEDICINE

## 2020-05-26 PROCEDURE — 99214 OFFICE O/P EST MOD 30 MIN: CPT | Mod: 25 | Performed by: INTERNAL MEDICINE

## 2020-05-26 PROCEDURE — 93017 CV STRESS TEST TRACING ONLY: CPT

## 2020-05-26 PROCEDURE — 99217 ZZC OBSERVATION CARE DISCHARGE: CPT | Performed by: HOSPITALIST

## 2020-05-26 PROCEDURE — 93288 INTERROG EVL PM/LDLS PM IP: CPT

## 2020-05-26 PROCEDURE — A9502 TC99M TETROFOSMIN: HCPCS | Performed by: HOSPITALIST

## 2020-05-26 PROCEDURE — 78452 HT MUSCLE IMAGE SPECT MULT: CPT

## 2020-05-26 PROCEDURE — 34300033 ZZH RX 343: Performed by: HOSPITALIST

## 2020-05-26 PROCEDURE — 83735 ASSAY OF MAGNESIUM: CPT | Performed by: HOSPITALIST

## 2020-05-26 PROCEDURE — 25000128 H RX IP 250 OP 636: Performed by: INTERNAL MEDICINE

## 2020-05-26 PROCEDURE — 36415 COLL VENOUS BLD VENIPUNCTURE: CPT | Performed by: HOSPITALIST

## 2020-05-26 PROCEDURE — 84132 ASSAY OF SERUM POTASSIUM: CPT | Performed by: HOSPITALIST

## 2020-05-26 PROCEDURE — 93018 CV STRESS TEST I&R ONLY: CPT | Performed by: INTERNAL MEDICINE

## 2020-05-26 RX ORDER — POTASSIUM CHLORIDE 1.5 G/1.58G
20 POWDER, FOR SOLUTION ORAL DAILY
Status: DISCONTINUED | OUTPATIENT
Start: 2020-05-27 | End: 2020-05-26 | Stop reason: HOSPADM

## 2020-05-26 RX ORDER — FUROSEMIDE 20 MG
20 TABLET ORAL DAILY
Qty: 30 TABLET | Refills: 0 | Status: SHIPPED | OUTPATIENT
Start: 2020-05-27 | End: 2020-06-11

## 2020-05-26 RX ORDER — AMINOPHYLLINE 25 MG/ML
50-100 INJECTION, SOLUTION INTRAVENOUS
Status: DISCONTINUED | OUTPATIENT
Start: 2020-05-26 | End: 2020-05-26

## 2020-05-26 RX ORDER — REGADENOSON 0.08 MG/ML
0.4 INJECTION, SOLUTION INTRAVENOUS ONCE
Status: COMPLETED | OUTPATIENT
Start: 2020-05-26 | End: 2020-05-26

## 2020-05-26 RX ORDER — ALBUTEROL SULFATE 90 UG/1
2 AEROSOL, METERED RESPIRATORY (INHALATION) EVERY 5 MIN PRN
Status: DISCONTINUED | OUTPATIENT
Start: 2020-05-26 | End: 2020-05-26

## 2020-05-26 RX ORDER — FUROSEMIDE 20 MG
20 TABLET ORAL DAILY
Status: DISCONTINUED | OUTPATIENT
Start: 2020-05-27 | End: 2020-05-26 | Stop reason: HOSPADM

## 2020-05-26 RX ORDER — ACYCLOVIR 200 MG/1
0-1 CAPSULE ORAL
Status: DISCONTINUED | OUTPATIENT
Start: 2020-05-26 | End: 2020-05-26

## 2020-05-26 RX ADMIN — ACYCLOVIR 400 MG: 400 TABLET ORAL at 08:43

## 2020-05-26 RX ADMIN — TETROFOSMIN 8.4 MCI.: 1.38 INJECTION, POWDER, LYOPHILIZED, FOR SOLUTION INTRAVENOUS at 09:33

## 2020-05-26 RX ADMIN — ASPIRIN 81 MG: 81 TABLET, DELAYED RELEASE ORAL at 08:43

## 2020-05-26 RX ADMIN — REGADENOSON 0.4 MG: 0.08 INJECTION, SOLUTION INTRAVENOUS at 11:10

## 2020-05-26 RX ADMIN — LISINOPRIL 10 MG: 10 TABLET ORAL at 08:44

## 2020-05-26 RX ADMIN — ACETAMINOPHEN 650 MG: 325 TABLET, FILM COATED ORAL at 11:31

## 2020-05-26 RX ADMIN — TETROFOSMIN 26.2 MCI.: 1.38 INJECTION, POWDER, LYOPHILIZED, FOR SOLUTION INTRAVENOUS at 11:14

## 2020-05-26 ASSESSMENT — MIFFLIN-ST. JEOR: SCORE: 1087.29

## 2020-05-26 NOTE — DISCHARGE SUMMARY
[facility]  Hospitalist Discharge Summary      Date of Admission:  5/24/2020  Date of Discharge:  5/26/2020  4:56 PM  Discharging Provider: Addison Ray MD      Discharge Diagnoses   1. Acute exacerbation of heart failure with preserved ejection failure  2. Type 2 NSTEMI - demand ischemia  3. History of symptomatic high degree AV block s/p PPM March 2017  4. Hypertension  5. Paroxysmal atrial fibrillation  6. History of multiple myeloma  7. Hypokalemia    Follow-ups Needed After Discharge   Follow-up Appointments     Follow-up and recommended labs and tests       PCP as needed  Heart failure clinic as scheduled - BMP prior to appointment             Unresulted Labs Ordered in the Past 30 Days of this Admission     No orders found from 4/24/2020 to 5/25/2020.      These results will be followed up by NA    Discharge Disposition   Discharged to home  Condition at discharge: Stable      Hospital Course   Quiana Dunaway is a markedly pleasant 84 year old woman with past history that is most significant for Multiple Myeloma, Hypertension, high grade AV block status post pacemaker placement, and paroxysmal atrial fibrillation; who presents with exertional dyspnea and is found to have accelerated hypertension and myonecrosis.      Dyspnea   T 2 MI, demand ischemia (NSTEMI technically)  She presents for progressive exertional dyspnea and leg swelling. She does not have known history of CAD. A Lexiscan in 2017 showed no evidence of inducible ischemia and TTE that year showed preserved LVEF with mild to moderate MR and no wall motion abnormalities. PFT tests in 2017 were also done and showed only mild diffusion impairment. Tonight she has mild myonecrosis, and a CTPA shows possible interstitial edema. She could have acute diastolic CHF exacerbation due to accelerated hypertension, causing demand ischemia. She could have progressive MR. We will rule out ACS or occult arrythmia. The CT was negative for  PE or infiltrate, and reactive airways disease seems less likely clinically at this time.  - Telemetry, serial enzymes flat but technically elevated,   - TTE no changes from prior - normal EF, as below  - Cardiology consulted.   - 20 mg IV Lasix x 1 on adm, PO lasix subsequently, defer to cardiology  - Resume outpatient 10 mg Lisinopril daily when verified  - Interrogated pacemaker - reportedly no problems  - On day of discharge, patient completed a Lexiscan which was unremarkable. Case discussed with Cardiology who agreed a low dose diuretic such as lasix 20 mg po daily would be reasonable. BMP and heart failure follow up are being arranged.      Rule Out COVID-19 infection  This patient was evaluated during a global COVID-19 pandemic, which necessitated consideration that the patient might be at risk for infection with the SARS-CoV-2 virus that causes COVID-19. Applicable protocols for evaluation were followed during the patient's care. LOW suspicion for infection.   - COVID 19 negative     Hypokalemia  Replaced     Status post pacemaker placement  Done in 2017 after a stress echo revealed signs of high grade AV block. Most recent device check 3/12/2020 revealed three episodes of PAT. She also reportedly has a history of paroxysmal atrial fibrillation; she is prescribed low dose aspirin for stroke prevention in the setting of her Multiple Myeloma.      Multiple Myeloma  Followed by Dr. Parry of [facility]; she has taken Velcade in the past, and currently takes Revlimid and Dexamethasone.     -- Resume Zometa infusions at discharge; continue Revlimid and Dexamethasone at discharge    -- Resumed BID Acyclovir      Chronic anxiety  Resume Lexapro at discharge      Consultations This Hospital Stay   CARDIOLOGY IP CONSULT    Code Status   Full Code    Time Spent on this Encounter   I, Addison Ray MD, personally saw the patient today and spent greater than 30 minutes discharging this patient.       Addison MCNALLY  MD Cory  [facility]  ______________________________________________________________________    Physical Exam   Vital Signs: Temp: 97.8  F (36.6  C) Temp src: Oral BP: 117/66 Pulse: 75 Heart Rate: 66 Resp: 15 SpO2: 94 % O2 Device: None (Room air)    Weight: 146 lbs 3.2 oz    Gen: NAD, pleasant  HEENT: Normocephalic, EOMI, MMM  Resp: no crackles,  no wheezes, no increased work of resp  CV: S1S2 heard, reg rhythm, reg rate, no pedal edema  Abdo: soft, nontender, nondistended, bowel sounds present  Ext: calves nontender, well perfused  Neuro: AAOx3, CN grossly intact, no facial asymmetry         Primary Care Physician   César Chung    Discharge Orders      Basic metabolic panel     Reason for your hospital stay    Shortness of breath and edema     Follow-up and recommended labs and tests     PCP as needed  Heart failure clinic as scheduled - BMP prior to appointment     Activity    Your activity upon discharge: activity as tolerated     Discharge Instructions    Take your lasix (furosemide) once daily. Follow up with your PCP and Heart Failure clinic.   If any symptoms return, contact your PCP or Cardiologist.     Full Code     Diet    Follow this diet upon discharge: Orders Placed This Encounter      Kosher Diet       Significant Results and Procedures   Most Recent 3 CBC's:  Recent Labs   Lab Test 05/24/20 2101 05/07/20  1331 04/01/20  1340   WBC 4.3 4.3 3.9*   HGB 11.9 13.0 12.4   MCV 91 89 89    168 155     Most Recent 3 BMP's:  Recent Labs   Lab Test 05/26/20  0531 05/25/20  0904 05/24/20 2101 05/07/20  1331 04/01/20  1340   NA  --   --  144 142 145*   POTASSIUM 4.7 3.5 3.3* 3.8 3.0*   CHLORIDE  --   --  114* 109 112*   CO2  --   --  25 24 27   BUN  --   --  20 24 22   CR  --   --  0.83 0.69 0.53   ANIONGAP  --   --  5 9 6   HUMBERTO  --   --  7.9* 8.8 8.7   GLC  --   --  129* 102* 91     Most Recent 3 Troponin's:  Recent Labs   Lab Test 05/25/20  0904 05/25/20  0430 05/25/20  0039    TROPI 0.041 0.051* 0.068*     Most Recent 3 BNP's:  Recent Labs   Lab Test 05/24/20  2101   NTBNPI 201     Most Recent Cholesterol Panel:  Recent Labs   Lab Test 02/09/15   CHOL 176   LDL 75   HDL 82   TRIG 93   ,   Results for orders placed or performed during the hospital encounter of 05/24/20   US Lower Extremity Venous Duplex Left    Narrative    EXAM: US LOWER EXTREMITY VENOUS DUPLEX LEFT  LOCATION: [facility] [place]  DATE/TIME: 5/24/2020 9:57 PM    INDICATION: Leg swelling.  COMPARISON: Left leg ultrasound 10/09/2017  TECHNIQUE: Venous Duplex ultrasound of the left lower extremity with and without compression, augmentation and duplex. Color flow and spectral Doppler with waveform analysis performed.    FINDINGS: Exam includes the common femoral, femoral, popliteal, and contralateral common femoral veins as well as segmentally visualized deep calf veins and greater saphenous vein.     LEFT: No deep vein thrombosis. No superficial thrombophlebitis. No popliteal cyst.      Impression    IMPRESSION:  1.  No deep venous thrombosis in the left lower extremity.   CT Chest Pulmonary Embolism w Contrast    Narrative    EXAM: CT CHEST PULMONARY EMBOLISM W CONTRAST  LOCATION: [facility]  DATE/TIME: 5/24/2020 10:21 PM    INDICATION: Hypertension. PE suspected, intermediate prob, positive D-dimer.  COMPARISON: 10/30/2019.  TECHNIQUE: CT angiogram chest during arterial phase injection IV contrast. 2D and 3D MIP reconstructions were performed by the CT technologist. Dose reduction techniques were used.   CONTRAST: 59 mL Isovue-370.    FINDINGS:  ANGIOGRAM CHEST: No visualized pulmonary embolism. Mildly tortuous, calcified thoracic aorta without aneurysm or dissection.    LUNGS AND PLEURA: Mild mosaic attenuation in the lungs. No consolidative infiltrates, pleural effusions or pneumothorax. Slight intralobular septal thickening suggesting component of interstitial edema.    MEDIASTINUM/AXILLAE: No  pathologic lymph node enlargement. Stable 2.5 cm left thyroid nodule with small central calcification. Minimal coronary artery calcifications. Small ovoid low-density nodule or cyst left medial breast 1.1 x 2.2 cm, stable. Left   chest wall cardiac device. Small hiatal hernia.    UPPER ABDOMEN: Normal.    MUSCULOSKELETAL: Kyphosis and degenerative changes thoracic spine.      Impression    IMPRESSION:  1.  No visualized pulmonary embolism.  2.  Minimal mosaic attenuation in the lungs may be due to air trapping, edema or minimal infiltrate. No consolidation.  3.  Slight intralobular septal thickening suggesting component of interstitial edema.  4.  Stable left thyroid and low density left breast nodule.   Echocardiogram Complete    Narrative    [id number]  [id number]  [id number]  [id number]^RYAN^JOHN^ALE           [facility]  Echocardiography Laboratory  [address] [place] [address]        Name: DUGLAS CASTILLO  MRN: [id number]  : 1935  Study Date: 2020 01:22 PM  Age: 84 yrs  Gender: Female  Patient Location: [facility]  Reason For Study: Chest Pain, Chest Pressure, Chest Tightness  Ordering Physician: JOHN DAVIS  Referring Physician: DIOGENES GAITAN  Performed By: Rosangela Mathews     BSA: 1.7 m2  Height: 63 in  Weight: 149 lb  HR: 70  BP: 132/66 mmHg  _____________________________________________________________________________  __        Procedure  Complete Portable Echo Adult.  _____________________________________________________________________________  __        Interpretation Summary     The left ventricle is normal in size.  The visual ejection fraction is estimated at 60-65%.  Grade I or early diastolic dysfunction.  No regional wall motion abnormalities noted.  No significant valvular heart disease.  _____________________________________________________________________________  __        Left Ventricle  The left ventricle is normal in size. There is mild concentric  left  ventricular hypertrophy. The visual ejection fraction is estimated at 60-65%.  Grade I or early diastolic dysfunction. No regional wall motion abnormalities  noted.     Right Ventricle  There is a catheter/pacemaker lead seen in the right ventricle. The right  ventricle is normal in size and function.     Atria  Normal left atrial size. Right atrial size is normal. There is no color  Doppler evidence of an atrial shunt.     Mitral Valve  The mitral valve leaflets are mildly thickened. There is mild (1+) mitral  regurgitation.        Tricuspid Valve  There is mild (1+) tricuspid regurgitation. The right ventricular systolic  pressure is approximated at 24.5 mmHg plus the right atrial pressure. IVC  diameter <2.1 cm collapsing >50% with sniff suggests a normal RA pressure of 3  mmHg.     Aortic Valve  There is trivial trileaflet aortic sclerosis. No aortic regurgitation is  present.     Pulmonic Valve  There is no pulmonic valvular regurgitation.     Vessels  Normal size aorta. The aortic root is normal size.     Pericardium  There is no pericardial effusion.        Rhythm  Sinus rhythm was noted.  _____________________________________________________________________________  __  MMode/2D Measurements & Calculations  IVSd: 1.2 cm     LVIDd: 4.2 cm  LVIDs: 2.4 cm  LVPWd: 1.2 cm  FS: 41.8 %  LV mass(C)d: 181.5 grams  LV mass(C)dI: 106.3 grams/m2  Ao root diam: 2.9 cm  LA dimension: 3.0 cm  asc Aorta Diam: 3.4 cm  LA/Ao: 1.0  LVOT diam: 2.1 cm  LVOT area: 3.5 cm2  LA Volume (BP): 51.2 ml  LA Volume Index (BP): 29.9 ml/m2  RWT: 0.58           Doppler Measurements & Calculations  MV E max kierra: 81.0 cm/sec  MV A max kierra: 101.7 cm/sec  MV E/A: 0.80  MV dec time: 0.36 sec  PA V2 max: 101.8 cm/sec  PA max P.1 mmHg  PA acc time: 0.10 sec  TR max kierra: 247.6 cm/sec  TR max P.5 mmHg  E/E' av.6  Lateral E/e': 10.4  Medial E/e': 12.9            _____________________________________________________________________________  __           Report approved by: Jasmeet Goddard 05/25/2020 05:20 PM      NM Lexiscan stress test (nuc card)     Value    Target     Baseline Systolic     Baseline Diastolic BP 69    Last Stress Systolic     Last Stress Diastolic BP 61    Baseline HR 66    Max HR 92    Calculated Percent HR 67    Rate Pressure Product 12,144.0    Stress/rest perfusion ratio 0.87    Narrative       The nuclear stress test is negative for inducible myocardial ischemia   or infarction.     Left ventricular function is normal.     The left ventricular ejection fraction at stress is greater than 70%.     No changes when compared to nuclear stress from April 2017.           Discharge Medications   Discharge Medication List as of 5/26/2020  4:40 PM      START taking these medications    Details   furosemide (LASIX) 20 MG tablet Take 1 tablet (20 mg) by mouth daily, Disp-30 tablet,R-0, E-Prescribe         CONTINUE these medications which have NOT CHANGED    Details   acyclovir (ZOVIRAX) 400 MG tablet Take 1 tablet (400 mg) by mouth 2 times daily Viral Prophylaxis., Disp-60 tablet,R-5, E-Prescribe      aspirin 81 MG chewable tablet Take 81 mg by mouth daily , Historical      dexamethasone (DECADRON) 4 MG tablet Take 1 tablet (4 mg) by mouth once a week Once a week with food on Days 1,8,15., Disp-20 tablet,R-0, E-Prescribe      LENalidomide (REVLIMID) 10 MG CAPS capsule Take 1 capsule (10 mg) by mouth daily for 14 days Take on Days 1 through 14 of 21-day cycle., Disp-14 capsule,R-0, E-PrescribeREMS Authorization #:  [id number]      lisinopril (ZESTRIL) 10 MG tablet TAKE 1 TABLET BY MOUTH DAILY, Disp-90 tablet,R-3, E-Prescribe      Loperamide HCl (IMODIUM A-D PO) Take 1 tablet by mouth as needed , Historical      MYRBETRIQ 25 MG 24 hr tablet TAKE ONE TABLET BY MOUTH ONCE DAILY, Disp-90 tablet,R-1, E-Prescribe      nitroglycerin (NITROSTAT) 0.4 MG  sublingual tablet For chest pain place 1 tablet under the tongue every 5 minutes for 3 doses. If symptoms persist 5 minutes after 1st dose call 911., Disp-25 tablet,R-0, E-Prescribe      prochlorperazine (COMPAZINE) 10 MG tablet Take 1 tablet (10 mg) by mouth every 6 hours as needed (Nausea/Vomiting), Disp-30 tablet,R-6, E-Prescribe      Zoledronic Acid (ZOMETA IV) Inject 4 mg into the vein every 3 months , Historical           Allergies   Allergies   Allergen Reactions     Levaquin [Levofloxacin] Other (See Comments)     Tingling in feet after 1 dose on 8/7/19         Lasix 20 daily  BMP and heart failure clinic in 1 week

## 2020-05-26 NOTE — PROGRESS NOTES
Lexiscan Stress:     Pt tolerated well. VSS. Pt denied chest pain or pressure prior to injection. After injection denies chest pain.  Complained of a headache rating it 5 out of 10 and slight nausea, but is better after resting.     Pt transferred back to Rm 243 per w/c with EKG tech.

## 2020-05-26 NOTE — PLAN OF CARE
[id number]:  Pt a/ox4. VSS. Tele 100% A-paced. Denies pain. Up independently. Pt has baseline neuropathy in LEs. Plan for Lexiscan today 5/26.

## 2020-05-26 NOTE — PLAN OF CARE
Patient discharge home to self care. Transport provided by her son. AVS and medications discussed with good understanding. IV and telemetry removed.     Temp: 97.8  F (36.6  C) Temp src: Oral BP: 117/66 Pulse: 75 Heart Rate: 66 Resp: 15 SpO2: 94 % O2 Device: None (Room air)       Heart Failure Care Pathway  GOALS TO BE MET BEFORE DISCHARGE:    1. Decrease congestion and/or edema with diuretic therapy to achieve near      optimal volume status.            Dyspnea improved:  Yes            Edema improved:     Yes        Net I/O and Weights since admission:          04/26 2300 - 05/26 2259  In: 1200 [P.O.:1200]  Out: 2750 [Urine:2750]  Net: -1550            Vitals:    05/24/20 2006 05/25/20 0207 05/25/20 0600 05/26/20 0529   Weight: 68 kg (150 lb) 69.8 kg (153 lb 12.8 oz) 67.8 kg (149 lb 8 oz) 66.3 kg (146 lb 3.2 oz)       2.  O2 sats > 92% on RA or at prior home O2 therapy level.          Current oxygenation status:       SpO2: 94 %         O2 Device: None (Room air),            Able to wean O2 this shift to keep sats > 92%:  Yes       Does patient use Home O2? No    3.  Tolerates ambulation and mobility near baseline: Yes        How many times did the patient ambulate with nursing staff this shift? 3    Please review the Heart Failure Care Pathway for additional HF goal outcomes.    Grace Fontana RN RN  5/26/2020

## 2020-05-26 NOTE — PROGRESS NOTES
[facility]  Cardiology Progress Note  Date of Service: 05/26/2020  Primary Cardiologist: Dr. Worthington    Assessment & Plan    Quiana Dunaway is a 84 year old female with past medical history significant for HTN, multiple myeloma, paroxysmal atrial fibrillation on device interrogations and symptomatic second degree AVB (s/p PPM 3/27/2017) admitted on 5/24/2020 with hypertension, shortness of breath on exertion and lower extremity edema. CT chest ruled out PE and LE duplex was negative for DVT. There were no ischemic changes on EKG and mild flat troponin elevation.     Assessment:   1. Acute exacerbation of HFpEF    Echocardiogram (5/25): LVEF 60-65% with grade I DD and no regional wall or valvular abnormalities.    Weight Down 7 lbs from 5/25.    I/O Net negative 1.9 L from addmission (net negative 2.1L last 24 hours)    Lasix IV 20 mg x 1 and no PO    2. Elevated troponin    Troponin 0.068-0.068-0.051    A Lexiscan in 2017 showed no evidence of inducible ischemia     The nuclear stress test from today is negative for inducible myocardial ischemia or infarction. LVEF >70%    3. Symptomatic high degree AVB    S/p PPM 3/2017    4. Hypertension    [PTA: lisinopril 10 mg daily]    Elevated upon arrival at 161/78    5. Paroxysmal atrial fibrillation    CHADS-VASc score of 4    Anticoagulation deferred in the setting of multiple myeloma    6. Multiple myeloma    Followed by Dr. Parry and Resume Zometa infusions at the plan is to continue Zometa, Revlimid and Dexamethasone at discharge    Resume BID Acyclovir    Plan:  1. Cardiology will sign off  2. Follow up with  [facility] in 7-10 days    MARIUSZ CAMPBELL CNP  Pager:  [phone]   (7am - 5pm, M-F)    Interval History   No recurrent symptoms.     Physical Exam   Temp: 97.7  F (36.5  C) Temp src: Oral BP: 121/77 Pulse: 65 Heart Rate: 66 Resp: 16 SpO2: 94 % O2 Device: None (Room air)    Vitals:    05/25/20 0207 05/25/20 0600 05/26/20 0529    Weight: 69.8 kg (153 lb 12.8 oz) 67.8 kg (149 lb 8 oz) 66.3 kg (146 lb 3.2 oz)       Constitutional:   NAD   Skin:   Warm and dry   Head:   Nontraumatic   Neck:   no JVD   Lungs:   symmetric, clear to auscultation   Cardiovascular:   normal S1 and S2   Abdomen:   Benign   Extremities and Back:   No edema   Neurological:   Grossly nonfocal       Medications       acyclovir  400 mg Oral BID     aspirin  162 mg Oral Once     aspirin  81 mg Oral Daily     dexamethasone  4 mg Oral Weekly     lisinopril  10 mg Oral Daily     sodium chloride (PF)  10 mL Intravenous Once     sodium chloride (PF)  3 mL Intracatheter Q8H     technetium Tc 99m tetrofosmin 2UD study  3-42 mCi Intravenous every 2 hours       Data     Most Recent 3 BMP's:  Recent Labs   Lab Test 05/26/20  0531 05/25/20  0904 05/24/20  2101 05/07/20  1331 04/01/20  1340   NA  --   --  144 142 145*   POTASSIUM 4.7 3.5 3.3* 3.8 3.0*   CHLORIDE  --   --  114* 109 112*   CO2  --   --  25 24 27   BUN  --   --  20 24 22   CR  --   --  0.83 0.69 0.53   ANIONGAP  --   --  5 9 6   HUMBERTO  --   --  7.9* 8.8 8.7   GLC  --   --  129* 102* 91     Most Recent 3 Troponin's:  Recent Labs   Lab Test 05/25/20  0904 05/25/20  0430 05/25/20  0039   TROPI 0.041 0.051* 0.068*     Most Recent 3 BNP's:  Recent Labs   Lab Test 05/24/20  2101   NTBNPI 201     Most Recent Cholesterol Panel:  Recent Labs   Lab Test 02/09/15   CHOL 176   LDL 75   HDL 82   TRIG 93

## 2020-05-27 ENCOUNTER — TELEPHONE (OUTPATIENT)
Dept: CARDIOLOGY | Facility: CLINIC | Age: 85
End: 2020-05-27

## 2020-05-27 ENCOUNTER — PATIENT OUTREACH (OUTPATIENT)
Dept: CARE COORDINATION | Facility: CLINIC | Age: 85
End: 2020-05-27

## 2020-05-27 ENCOUNTER — TRANSFERRED RECORDS (OUTPATIENT)
Dept: HEALTH INFORMATION MANAGEMENT | Facility: CLINIC | Age: 85
End: 2020-05-27

## 2020-05-27 ENCOUNTER — TELEPHONE (OUTPATIENT)
Dept: FAMILY MEDICINE | Facility: CLINIC | Age: 85
End: 2020-05-27

## 2020-05-27 DIAGNOSIS — I50.30 HEART FAILURE WITH PRESERVED EJECTION FRACTION, NYHA CLASS I (H): Primary | ICD-10-CM

## 2020-05-27 SDOH — SOCIAL STABILITY: SOCIAL NETWORK: HOW OFTEN DO YOU ATTEND CHURCH OR RELIGIOUS SERVICES?: MORE THAN 4 TIMES PER YEAR

## 2020-05-27 SDOH — SOCIAL STABILITY: SOCIAL NETWORK
DO YOU BELONG TO ANY CLUBS OR ORGANIZATIONS SUCH AS CHURCH GROUPS UNIONS, FRATERNAL OR ATHLETIC GROUPS, OR SCHOOL GROUPS?: YES

## 2020-05-27 SDOH — ECONOMIC STABILITY: TRANSPORTATION INSECURITY
IN THE PAST 12 MONTHS, HAS THE LACK OF TRANSPORTATION KEPT YOU FROM MEDICAL APPOINTMENTS OR FROM GETTING MEDICATIONS?: NO

## 2020-05-27 SDOH — SOCIAL STABILITY: SOCIAL NETWORK: HOW OFTEN DO YOU GET TOGETHER WITH FRIENDS OR RELATIVES?: TWICE A WEEK

## 2020-05-27 SDOH — ECONOMIC STABILITY: INCOME INSECURITY: HOW HARD IS IT FOR YOU TO PAY FOR THE VERY BASICS LIKE FOOD, HOUSING, MEDICAL CARE, AND HEATING?: NOT HARD AT ALL

## 2020-05-27 SDOH — HEALTH STABILITY: MENTAL HEALTH
STRESS IS WHEN SOMEONE FEELS TENSE, NERVOUS, ANXIOUS, OR CAN'T SLEEP AT NIGHT BECAUSE THEIR MIND IS TROUBLED. HOW STRESSED ARE YOU?: NOT AT ALL

## 2020-05-27 SDOH — ECONOMIC STABILITY: FOOD INSECURITY: WITHIN THE PAST 12 MONTHS, YOU WORRIED THAT YOUR FOOD WOULD RUN OUT BEFORE YOU GOT MONEY TO BUY MORE.: NEVER TRUE

## 2020-05-27 SDOH — HEALTH STABILITY: PHYSICAL HEALTH: ON AVERAGE, HOW MANY DAYS PER WEEK DO YOU ENGAGE IN MODERATE TO STRENUOUS EXERCISE (LIKE A BRISK WALK)?: 0 DAYS

## 2020-05-27 SDOH — ECONOMIC STABILITY: TRANSPORTATION INSECURITY
IN THE PAST 12 MONTHS, HAS LACK OF TRANSPORTATION KEPT YOU FROM MEETINGS, WORK, OR FROM GETTING THINGS NEEDED FOR DAILY LIVING?: NO

## 2020-05-27 SDOH — SOCIAL STABILITY: SOCIAL NETWORK: IN A TYPICAL WEEK, HOW MANY TIMES DO YOU TALK ON THE PHONE WITH FAMILY, FRIENDS, OR NEIGHBORS?: TWICE A WEEK

## 2020-05-27 SDOH — ECONOMIC STABILITY: FOOD INSECURITY: WITHIN THE PAST 12 MONTHS, THE FOOD YOU BOUGHT JUST DIDN'T LAST AND YOU DIDN'T HAVE MONEY TO GET MORE.: NEVER TRUE

## 2020-05-27 SDOH — HEALTH STABILITY: PHYSICAL HEALTH: ON AVERAGE, HOW MANY MINUTES DO YOU ENGAGE IN EXERCISE AT THIS LEVEL?: 0 MIN

## 2020-05-27 SDOH — SOCIAL STABILITY: SOCIAL NETWORK: HOW OFTEN DO YOU ATTENT MEETINGS OF THE CLUB OR ORGANIZATION YOU BELONG TO?: NEVER

## 2020-05-27 ASSESSMENT — ACTIVITIES OF DAILY LIVING (ADL): DEPENDENT_IADLS:: INDEPENDENT

## 2020-05-27 NOTE — LETTER
[facility]  Complex Care Plan  About Me:    Patient Name:  Quiana Castillo    YOB: 1935  Age:         84 year old   [facility] MRN:    [id number] Telephone Information:  Home Phone [phone]   Mobile [phone]       Address:  [address] [facility] [address] Email address:  [email or web address]      Emergency Contact(s)    Name Relationship Lgl Grd Work Phone Home Phone Mobile Phone   1. SHADI CASTILLO Relative   [phone]   2. ANUJ CASTILLO Son   [phone]   3. RACQUEL CASTILLO Son   [phone]           Primary language:  English     needed? No   [employer] [place] [employer]:  [phone] op. 1  Other communication barriers: None  Preferred Method of Communication:  Mail  Current living arrangement: I live alone  Mobility Status/ Medical Equipment: Independent w/Device    Health Maintenance  Health Maintenance Reviewed: Due/Overdue   Health Maintenance Due   Topic Date Due     DEXA  1935     ADVANCE CARE PLANNING  1935     ZOSTER IMMUNIZATION (1 of 2) 07/23/1986     MEDICARE ANNUAL WELLNESS VISIT  07/23/2001       My Access Plan  Medical Emergency 911   Primary Clinic Line [facility] - [phone]   24 Hour Appointment Line [phone] or  [phone]) (toll-free)   24 Hour Nurse Line [phone] (toll-free)   Preferred Urgent Care [facility], [phone]   Preferred Hospital [facility]  [phone]   Preferred Pharmacy [id number] - [place], [place] - [facility] [address] [facility] [address]     Behavioral Health Crisis Line The National Suicide Prevention Lifeline at 1-149.392.4175 or 911             My Care Team Members  Patient Care Team       Relationship Specialty Notifications Start End    César Chung MD PCP - General Internal Medicine  10/27/16     Phone: [phone] Pager: [phone] Fax:  [phone]        [address] [place] [address]    César Chung MD MD Internal Medicine  3/9/13     Phone: [phone] Pager: [phone] Fax: [phone]        [address] [place] [address]    César Chung MD Assigned PCP   2/19/17     Phone: [phone] Pager: [phone] Fax: [phone]        [address] [place] [address]            My Care Plans  Self Management and Treatment Plan  Goals and (Comments)  Goals        General    Monitoring (pt-stated)     Notes - Note edited  5/27/2020  4:07 PM by Shannon Lemons, RN    Goal Statement: I will seek medical help if I have increased shortness of breath ,cough ,fever weight gain or coughing up a colored sputum  Date Goal set: 5/27/2020  Barriers: Deconditioned   Strengths: Motivated   Date to Achieve By: 8/27/2020  Patient expressed understanding of goal: Yes  Action steps to achieve this goal:  1. I will check my weight daily and call if I have a weight gain 2-3# in a day or 5# in a week  2. I will follow a no added salt diet  3. I will  drink plenty of water to prevent dehydration  recommended 6-8 glasses of water a day   4. I will keep future Cardiology and Oncology future visits   5. Care coordinator will make follow up calls in 1-2 weeks  As of today's date 5/27/2020 goal is met at 0 - 25%.   Goal Status:  Active                Action Plans on File: CHF       Advance Care Plans/Directives Type: None       My Medical and Care Information  Problem List   Patient Active Problem List   Diagnosis     Benign essential hypertension     Multiple myeloma not having achieved remission (H)     Pacemaker     ROBER (generalized anxiety disorder)     CKD (chronic kidney disease) stage 3, GFR 30-59 ml/min (H)      Current Medications and Allergies:   Current Outpatient Medications   Medication     acyclovir (ZOVIRAX) 400 MG tablet     aspirin 81 MG chewable tablet     dexamethasone (DECADRON) 4 MG tablet     furosemide (LASIX) 20  MG tablet     LENalidomide (REVLIMID) 10 MG CAPS capsule     LENalidomide (REVLIMID) 10 MG CAPS capsule     lisinopril (ZESTRIL) 10 MG tablet     Loperamide HCl (IMODIUM A-D PO)     MYRBETRIQ 25 MG 24 hr tablet     nitroglycerin (NITROSTAT) 0.4 MG sublingual tablet     prochlorperazine (COMPAZINE) 10 MG tablet     Zoledronic Acid (ZOMETA IV)     No current facility-administered medications for this visit.      Care Coordination Start Date: 5/27/2020   Frequency of Care Coordination: 2 weeks   Form Last Updated: 05/27/2020

## 2020-05-27 NOTE — LETTER
[place] CARE COORDINATION  [address]  [facility] [address]    May 27, 2020    Quiana Dunaway  [address] [facility] [place] [address]      Dear Quiana,    I am a clinic care coordinator who works with César Chung MD at [facility]. I wanted to thank you for spending the time to talk with me.  Below is a description of clinic care coordination and how I can further assist you.      The clinic care coordination team is made up of a registered nurse,  and community health worker who understand the health care system. The goal of clinic care coordination is to help you manage your health and improve access to the health care system in the most efficient manner. The team can assist you in meeting your health care goals by providing education, coordinating services, strengthening the communication among your providers and supporting you with any resource needs.    Please feel free to contact me at [phone] with any questions or concerns. We are focused on providing you with the highest-quality healthcare experience possible and that all starts with you.     Sincerely,     [facility]     Shannon Lemons RN Care Coordinator   [facility] / [facility]   Phone: [phone]  Email :  [email or web address]      Enclosed: I have enclosed a copy of the Complex Care Plan. This has helpful information and goals that we have talked about. Please keep this in an easy to access place to use as needed.

## 2020-05-27 NOTE — PROGRESS NOTES
[facility]  Hospitalist Discharge Summary       Clinic Care Coordination Contact    Clinic Care Coordination Contact  OUTREACH    Referral Information:  Referral Source: IP Report    Primary Diagnosis: CHF    Chief Complaint   Patient presents with     Clinic Care Coordination - Post Hospital     Clinic Care Coordination RN     Clinic Care Coordination - Initial        Universal Utilization:   Date of Admission:  5/24/2020 Discharge:  5/26/2020  4:56 PM  Discharging Provider: Addison Ray MD             Discharge Diagnoses     1. Acute exacerbation of heart failure with preserved ejection failure  2. Type 2 NSTEMI - demand ischemia  3. History of symptomatic high degree AV block s/p PPM March 2017  4. Hypertension  5. Paroxysmal atrial fibrillation  6. History of multiple myeloma  7. Hypokalemia    Clinic Utilization  Difficulty keeping appointments:: No  Compliance Concerns: No  No-Show Concerns: No  No PCP office visit in Past Year: Yes  Utilization    Last refreshed: 5/27/2020  4:04 PM:  Hospital Admissions 1           Last refreshed: 5/27/2020  4:04 PM:  ED Visits 2           Last refreshed: 5/27/2020  4:04 PM:  No Show Count (past year) 0              Current as of: 5/27/2020  4:04 PM              Clinical Concerns:  Current Medical Concerns:  Get well Loop referral placed     Current Behavioral Concerns:Not discussed Education Provided to patient: CC introdusction   Pain  Pain (GOAL):: No  Health Maintenance Reviewed: Due/Overdue   Health Maintenance Due   Topic Date Due     DEXA  1935     ADVANCE CARE PLANNING  1935     ZOSTER IMMUNIZATION (1 of 2) 07/23/1986     MEDICARE ANNUAL WELLNESS VISIT  07/23/2001       Clinical Pathway: Clinic Care Coordination CHF Assessment    Discharge:  Day of hospital discharge: 5/24/20  What recommendations were made for follow up after your recent hospitalization? Lasix 20 mg daily  Have the follow up appointments been scheduled? Yes  If not,  "can I help you set up these appointments? No       CHF:    Home scale available:  Yes will start checking weight going forward    Heart Failure Zones sheet on refrigerator or available: No  Any increased SOB since hospital discharge: No  Any increased edema since hospital discharge:  No  What number to call for YELLOW zones: [phone]    Symptom Review:   Heart Failure Symptoms  Shortness of breath:: No  Wheezing or noisy breathing?: No  Cough: No  Increased sputum: No  Fever: No  Chest pain: : No  Checking weight daily? : Yes  Weight?: Unchanged  Weight increase more than 2 lbs in 24 hours?: No  Weight Increase more than 5 lbs in 1 week? : No  Does the patient have understanding of Diuretic self-management?: Yes  Diet:: No added salt  Appetite:: Normal  Bloating:: None  Fatigue: Yes  Weakness (Heaviness in limbs):: Yes  What Heart Failure zone are you currently in?: Green  Overall your CHF symptoms are (GOAL):: Stable    Medications:  \"How many new medications are you on since your hospitalization?\"  0 - 1  \"How many of your current medicines changed (dose, timing, name, etc.) while you were in the hospital?\"  0 - 1  \"Do you have questions about your medications?\"  No   INSULIN:[id number]}  Is patient on Warfarin? NOIs Ejection Fraction <40%: No    When is the first appointment with the CHF clinic:6/3/2020               Medication Management:   Medication reconciliation status: Medications reviewed and reconciled.  Changed medications per patient report.       Functional Status:  Dependent ADLs:: Ambulation-cane  Dependent IADLs:: Independent  Bed or wheelchair confined:: No  Mobility Status: Independent w/Device    Living Situation:  Current living arrangement:: I live alone    Lifestyle & Psychosocial Needs:  Lifestyle     Physical activity     Days per week: 0 days     Minutes per session: 0 min     Stress: Not at all     Social Needs     Financial resource strain: Not hard at all     Food insecurity     Worry: " Never true     Inability: Never true     Transportation needs     Medical: No     Non-medical: No     Diet:: No added salt  Inadequate nutrition (GOAL):: No  Tube Feeding: No  Inadequate activity/exercise (GOAL):: No  Significant changes in sleep pattern (GOAL): No  Transportation means:: Family, Accessible car     Evangelical or spiritual beliefs that impact treatment:: No  Mental health DX:: No  Mental health management concern (GOAL):: No  Informal Support system:: Children   Socioeconomic History     Marital status:      Spouse name: Not on file     Number of children: Not on file     Years of education: Not on file     Highest education level: Not on file   Relationships     Social connections     Talks on phone: Twice a week     Gets together: Twice a week     Attends Tenriism service: More than 4 times per year     Active member of club or organization: Yes     Attends meetings of clubs or organizations: Never     Relationship status: Not on file     Intimate partner violence     Fear of current or ex partner: Not on file     Emotionally abused: Not on file     Physically abused: Not on file     Forced sexual activity: Not on file     Tobacco Use     Smoking status: Never Smoker     Smokeless tobacco: Never Used   Substance and Sexual Activity     Alcohol use: No     Alcohol/week: 0.0 standard drinks     Drug use: No     Sexual activity: Never               Resources and Interventions:  Current Resources:      Community Resources: None     Equipment Currently Used at Home: cane, straight    Advance Care Plan/Directive  Advanced Care Plans/Directives on file:: No  Advanced Care Plan/Directive Status: Not Applicable    Referrals Placed: None     Goals:   Goals        General    Monitoring (pt-stated)     Notes - Note edited  5/27/2020  4:07 PM by Shannon Lemons RN    Goal Statement: I will seek medical help if I have increased shortness of breath ,cough ,fever weight gain or coughing up a colored  sputum  Date Goal set: 5/27/2020  Barriers: Deconditioned   Strengths: Motivated   Date to Achieve By: 8/27/2020  Patient expressed understanding of goal: Yes  Action steps to achieve this goal:  1. I will check my weight daily and call if I have a weight gain 2-3# in a day or 5# in a week  2. I will follow a no added salt diet  3. I will  drink plenty of water to prevent dehydration  recommended 6-8 glasses of water a day   4. I will keep future Cardiology and Oncology future visits   5. Care coordinator will make follow up calls in 1-2 weeks  As of today's date 5/27/2020 goal is met at 0 - 25%.   Goal Status:  Active               Patient/Caregiver understanding: Expresses good understnading    Outreach Frequency: 2 weeks  Future Appointments              Tomorrow César Chung MD [facility],     In 6 days Alex Parry MD [facility]    In 1 week Bella Wilkerson, APRN CNP [facility]    In 1 month [facility]    In 1 month RIOS DCRN [facility] CLIN    In 1 month Alberto Worthington MD [facility] PSA CLIN          Plan: Patient will start checking weight daily,follow a no added salt diet,take lLasix as directed  Patient will keep future Oncology and Cardiology(CORE) appointments  Patient will seek help if increased symptoms of concern   CC will follow up in 1-2 weeks  [facility]     Shannon Lemons RN Care Coordinator   [facility] / [facility] -[facility]   Phone: [phone]  Email :  [email or web address]

## 2020-05-27 NOTE — TELEPHONE ENCOUNTER
Patient was evaluated by cardiology while inpatient for HTN, PORTILLO and BLE edema, with elevated, but flat troponin rise. PMH: Multiple myeloma, second degree HB with PPM implantation, PAF, HTN. Chest CT was negative for PE and BLE US negative for DVT. 5/26/20: Nuclear stress test was negative for inducible myocardial ischemia or infarction. LVEF >70%. IV Lasix diuresed and started on po prior to discharge. Called patient to discuss any post hospital d/c questions, review discharge medication, and confirm f/u appts. Patient denied any questions regarding new or changes to PTA medications. Patient denied any SOB, chest pain, edema, or light headedness. RN confirmed with patient that she has a phone appt scheduled with ROSA MARIA Bella Ekman on 6/3/20 at 1245. Instructed patient to weigh self every AM, after waking and using the restroom, but before breakfast and medications. Call clinic for a weight gain of 2 lbs overnight or 5 lbs in a week. Low Na+ diet encouraged. Pt instructed to have daily wt/BP diary and medications readily available for phone visit. Patient advised to call clinic with any cardiac related questions or concerns prior to his appt. Patient verbalized understanding and agreed with plan. THADDEUS Weber RN.

## 2020-05-27 NOTE — TELEPHONE ENCOUNTER
Chief Complaint: Shortness Of Breath, Benign Essential Hypertension,  TUE 26-MAY-2020  0 / 0      [phone] ([place])

## 2020-05-27 NOTE — TELEPHONE ENCOUNTER
Patient being contacted by two different CC.  No call from triage needed.    DARLENE DashN, RN  [employer] [facility]

## 2020-05-28 ENCOUNTER — VIRTUAL VISIT (OUTPATIENT)
Dept: FAMILY MEDICINE | Facility: CLINIC | Age: 85
End: 2020-05-28
Payer: MEDICARE

## 2020-05-28 ENCOUNTER — TELEPHONE (OUTPATIENT)
Dept: URGENT CARE | Facility: CLINIC | Age: 85
End: 2020-05-28

## 2020-05-28 DIAGNOSIS — I10 BENIGN ESSENTIAL HYPERTENSION: ICD-10-CM

## 2020-05-28 DIAGNOSIS — F41.1 GAD (GENERALIZED ANXIETY DISORDER): ICD-10-CM

## 2020-05-28 DIAGNOSIS — N18.30 CKD (CHRONIC KIDNEY DISEASE) STAGE 3, GFR 30-59 ML/MIN (H): ICD-10-CM

## 2020-05-28 DIAGNOSIS — I50.32 CHRONIC HEART FAILURE WITH PRESERVED EJECTION FRACTION (H): Primary | ICD-10-CM

## 2020-05-28 DIAGNOSIS — Z95.0 PACEMAKER: ICD-10-CM

## 2020-05-28 DIAGNOSIS — C90.00 MULTIPLE MYELOMA NOT HAVING ACHIEVED REMISSION (H): ICD-10-CM

## 2020-05-28 PROCEDURE — 99443: CPT | Performed by: INTERNAL MEDICINE

## 2020-05-28 NOTE — TELEPHONE ENCOUNTER
Called patient in regards to GetWell Loop response of anxiety and breathing issues.  Spoke to daughter in law, Chery, re: symptoms. Daughter in law states symptoms are not related to COVID, but rather Quiana's hospitalization from Monday-Tuesday for HTN. Daughter in law stated that Quiana is not in any distress currently and has a telehealth appointment with her PCP later today in regards to hospitalization. She thought Getwell Loop was related to PCP appointment and would like to be taken off.     Maylin Ugalde RN, FNP student  [facility]    I discussed the case with the NP student, Maylin Ugalde RN who participated in the service and in the documentation of the services provided. I have verified the history and medical decision making, as documented by the student and edited by me. Gissel VEE CNP

## 2020-05-28 NOTE — TELEPHONE ENCOUNTER
Writer attempted to return daughter in law, Regi's, phone message, but no answer. All information as reviewed with pt below was left on a VM for Casandraia and instructed to call back with any further questions. THADDEUS Weber RN

## 2020-05-28 NOTE — PROGRESS NOTES
"Quiana Dunaway is a 84 year old female who is being evaluated via a billable telephone visit.      The patient has been notified of following:     \"This telephone visit will be conducted via a call between you and your physician/provider. We have found that certain health care needs can be provided without the need for a physical exam.  This service lets us provide the care you need with a short phone conversation.  If a prescription is necessary we can send it directly to your pharmacy.  If lab work is needed we can place an order for that and you can then stop by our lab to have the test done at a later time.    Telephone visits are billed at different rates depending on your insurance coverage. During this emergency period, for some insurers they may be billed the same as an in-person visit.  Please reach out to your insurance provider with any questions.    If during the course of the call the physician/provider feels a telephone visit is not appropriate, you will not be charged for this service.\"    Patient has given verbal consent for Telephone visit?  Yes    What phone number would you like to be contacted at? [phone]    How would you like to obtain your AVS? Paige Cavazos     Quiana Dunaway is a 84 year old female who presents via phone visit today for the following health issues:    [facility]    Hospital Follow-up Visit:    Hospital/Nursing Home/IP Rehab Facility: [facility]  Date of Admission: 05/24/2020  Date of Discharge: 05/26/2020  Reason(s) for Admission:     1. Acute exacerbation of heart failure with preserved ejection failure  2. Type 2 NSTEMI - demand ischemia  3. History of symptomatic high degree AV block s/p PPM March 2017  4. Hypertension  5. Paroxysmal atrial fibrillation  6. History of multiple myeloma  7. Hypokalemia      Was your hospitalization related to COVID-19? No   Problems taking medications regularly:  None  Medication changes since discharge: " None  Problems adhering to non-medication therapy:  None    Summary of hospitalization:  [facility] discharge summary reviewed  Diagnostic Tests/Treatments reviewed.  Follow up needed: cardiology  Other Healthcare Providers Involved in Patient s Care:         None  Update since discharge: improved. Post Discharge Medication Reconciliation: discharge medications reconciled and changed, per note/orders (see AVS).  Plan of care communicated with patient and family          Hospitalized for elevated blood pressure and dyspnea  Diagnosis with diastolic CHF (HFPef)  Symptoms improved with diuresis  Troponin was elevated  Stress test negative  She feels a little tired, but no other specific complaints  Family had many questions about diet, activity follow up etc    Patient Active Problem List   Diagnosis     Benign essential hypertension     Multiple myeloma not having achieved remission (H)     Pacemaker     ROBER (generalized anxiety disorder)     CKD (chronic kidney disease) stage 3, GFR 30-59 ml/min (H)     Past Surgical History:   Procedure Laterality Date     ------------OTHER-------------      cataract eye surgery     BONE MARROW BIOPSY, BONE SPECIMEN, NEEDLE/TROCAR N/A 7/12/2017    Procedure: BIOPSY BONE MARROW;  BONE MARROW BIOPSY ;  Surgeon: Grace Vela MD;  Location:  GI     IMPLANT PACEMAKER  03/2017    AV block       Social History     Tobacco Use     Smoking status: Never Smoker     Smokeless tobacco: Never Used   Substance Use Topics     Alcohol use: No     Alcohol/week: 0.0 standard drinks     Family History   Problem Relation Age of Onset     Unknown/Adopted Mother      Unknown/Adopted Father          Current Outpatient Medications   Medication Sig Dispense Refill     acyclovir (ZOVIRAX) 400 MG tablet Take 1 tablet (400 mg) by mouth 2 times daily Viral Prophylaxis. 60 tablet 5     aspirin 81 MG chewable tablet Take 81 mg by mouth daily        dexamethasone (DECADRON) 4 MG tablet Take 1  tablet (4 mg) by mouth once a week Once a week with food on Days 1,8,15. (Patient taking differently: Take 4 mg by mouth once a week On Mondays  Once a week with food on Days 1,8,15.) 20 tablet 0     furosemide (LASIX) 20 MG tablet Take 1 tablet (20 mg) by mouth daily 30 tablet 0     LENalidomide (REVLIMID) 10 MG CAPS capsule Take 1 capsule (10 mg) by mouth daily for 14 days Take on Days 1 through 14 of 21-day cycle. 14 capsule 0     lisinopril (ZESTRIL) 10 MG tablet TAKE 1 TABLET BY MOUTH DAILY 90 tablet 3     Loperamide HCl (IMODIUM A-D PO) Take 1 tablet by mouth as needed        MYRBETRIQ 25 MG 24 hr tablet TAKE ONE TABLET BY MOUTH ONCE DAILY 90 tablet 1     nitroglycerin (NITROSTAT) 0.4 MG sublingual tablet For chest pain place 1 tablet under the tongue every 5 minutes for 3 doses. If symptoms persist 5 minutes after 1st dose call 911. 25 tablet 0     prochlorperazine (COMPAZINE) 10 MG tablet Take 1 tablet (10 mg) by mouth every 6 hours as needed (Nausea/Vomiting) 30 tablet 6     Zoledronic Acid (ZOMETA IV) Inject 4 mg into the vein every 3 months        LENalidomide (REVLIMID) 10 MG CAPS capsule Take 1 capsule (10 mg) by mouth daily for 14 days Take on Days 1 through 14 of 21-day cycle. 14 capsule 0     Allergies   Allergen Reactions     Levaquin [Levofloxacin] Other (See Comments)     Tingling in feet after 1 dose on 8/7/19         Reviewed and updated as needed this visit by Provider         Review of Systems   Constitutional, HEENT, cardiovascular, pulmonary, gi and gu systems are negative, except as otherwise noted.       Objective   Reported vitals:  There were no vitals taken for this visit.   PSYCH: Alert and oriented times 3; coherent speech, normal   rate and volume, able to articulate logical thoughts, able   to abstract reason, no tangential thoughts, no hallucinations   or delusions  Her affect is normal  RESP: No cough, no audible wheezing, able to talk in full sentences  Remainder of exam unable  to be completed due to telephone visits    Diagnostic Test Results:  Labs reviewed in Epic        Assessment/Plan:  1. Chronic heart failure with preserved ejection fraction (H)  Improved clinically on diuretic  Follow up with cardiology as directed  Answered families questions about diet sodium, activity and appropriate follow up in addition to daily weights and informing if weight goes up more than 5 lbs in one week or 2 lbs in 24 hours     2. CKD (chronic kidney disease) stage 3, GFR 30-59 ml/min (H)  This remains stable    3. Multiple myeloma not having achieved remission (H)  Continue follow up with Dr. Parry's team; stable     4. Benign essential hypertension  Well controlled; continue lisinopril, blood pressure control should improve with loop diuretic as well     5. ROBER (generalized anxiety disorder)  Stable continue lexapro     6. Pacemaker        Return in about 2 weeks (around 6/11/2020) for recheck.  Patient instructed to return to clinic or contact us sooner if symptoms worsen or new symptoms develop.       Phone call duration:  21 minutes    César Chung MD

## 2020-06-01 ENCOUNTER — TELEPHONE (OUTPATIENT)
Dept: ONCOLOGY | Facility: CLINIC | Age: 85
End: 2020-06-01

## 2020-06-01 ENCOUNTER — PATIENT OUTREACH (OUTPATIENT)
Dept: ONCOLOGY | Facility: CLINIC | Age: 85
End: 2020-06-01

## 2020-06-01 NOTE — TELEPHONE ENCOUNTER
Left voicemail to have patient return out call. Since she doesn't need labs or an infusion tomorrow Dr Parry would like to switch her appt to a Video to Telephone visit.

## 2020-06-01 NOTE — PROGRESS NOTES
received a call that patient request to be seen in person and labs are due.  has requested that patient be put on the schedule for 1:50pm and to wait out side the clinic for her 2:40p appointment with Dr. Parry.      Alecia Briggs RN

## 2020-06-01 NOTE — PROGRESS NOTES
[facility] while in ED at [facility] for SOB and HTN. pt admitted for CHF.  Medtronic Advisa (D) PPM  53 %A-paced; <1 %V-paced; programmed DDDR 60/120. Heart rate is stable with good variability. 1 mode switch lasting 2 minutes, EGM shows A flutter. Discussion was made regarding AC in 2018. Given underlying CA with minimal amt of AF/AFL, it was decided aspirin therapy was safer. 1 event logged as NSVT showed a burst of PAT (P waves falling in refractory). Battery is stable with 6.5 yrs remaining. Lead measurements are stable. Continue on current schedule. FAUSTINO Thornton

## 2020-06-02 ENCOUNTER — PATIENT OUTREACH (OUTPATIENT)
Dept: ONCOLOGY | Facility: CLINIC | Age: 85
End: 2020-06-02

## 2020-06-02 ENCOUNTER — ONCOLOGY VISIT (OUTPATIENT)
Dept: ONCOLOGY | Facility: CLINIC | Age: 85
End: 2020-06-02
Attending: INTERNAL MEDICINE
Payer: MEDICARE

## 2020-06-02 ENCOUNTER — INFUSION THERAPY VISIT (OUTPATIENT)
Dept: INFUSION THERAPY | Facility: CLINIC | Age: 85
End: 2020-06-02
Attending: INTERNAL MEDICINE
Payer: MEDICARE

## 2020-06-02 ENCOUNTER — HOSPITAL ENCOUNTER (OUTPATIENT)
Facility: CLINIC | Age: 85
Setting detail: SPECIMEN
Discharge: HOME | End: 2020-06-02
Attending: INTERNAL MEDICINE | Admitting: INTERNAL MEDICINE
Payer: MEDICARE

## 2020-06-02 VITALS
HEART RATE: 68 BPM | OXYGEN SATURATION: 94 % | BODY MASS INDEX: 26.19 KG/M2 | SYSTOLIC BLOOD PRESSURE: 99 MMHG | WEIGHT: 147.8 LBS | TEMPERATURE: 97.8 F | DIASTOLIC BLOOD PRESSURE: 66 MMHG | RESPIRATION RATE: 15 BRPM | HEIGHT: 63 IN

## 2020-06-02 DIAGNOSIS — C90.00 MULTIPLE MYELOMA NOT HAVING ACHIEVED REMISSION (H): Primary | ICD-10-CM

## 2020-06-02 LAB
ACANTHOCYTES BLD QL SMEAR: SLIGHT
ALBUMIN SERPL-MCNC: 3.7 G/DL (ref 3.4–5)
ALP SERPL-CCNC: 64 U/L (ref 40–150)
ALT SERPL W P-5'-P-CCNC: 20 U/L (ref 0–50)
ANION GAP SERPL CALCULATED.3IONS-SCNC: 8 MMOL/L (ref 3–14)
AST SERPL W P-5'-P-CCNC: 16 U/L (ref 0–45)
BASOPHILS # BLD AUTO: 0.1 10E9/L (ref 0–0.2)
BASOPHILS NFR BLD AUTO: 2 %
BILIRUB SERPL-MCNC: 1.1 MG/DL (ref 0.2–1.3)
BUN SERPL-MCNC: 30 MG/DL (ref 7–30)
CALCIUM SERPL-MCNC: 8.9 MG/DL (ref 8.5–10.1)
CHLORIDE SERPL-SCNC: 103 MMOL/L (ref 94–109)
CO2 SERPL-SCNC: 25 MMOL/L (ref 20–32)
CREAT SERPL-MCNC: 0.88 MG/DL (ref 0.52–1.04)
DIFFERENTIAL METHOD BLD: ABNORMAL
ELLIPTOCYTES BLD QL SMEAR: SLIGHT
EOSINOPHIL # BLD AUTO: 0.2 10E9/L (ref 0–0.7)
EOSINOPHIL NFR BLD AUTO: 4 %
ERYTHROCYTE [DISTWIDTH] IN BLOOD BY AUTOMATED COUNT: 13.4 % (ref 10–15)
GFR SERPL CREATININE-BSD FRML MDRD: 60 ML/MIN/{1.73_M2}
GLUCOSE SERPL-MCNC: 91 MG/DL (ref 70–99)
HCT VFR BLD AUTO: 37.7 % (ref 35–47)
HGB BLD-MCNC: 13.3 G/DL (ref 11.7–15.7)
LYMPHOCYTES # BLD AUTO: 2 10E9/L (ref 0.8–5.3)
LYMPHOCYTES NFR BLD AUTO: 40 %
MCH RBC QN AUTO: 31.1 PG (ref 26.5–33)
MCHC RBC AUTO-ENTMCNC: 35.3 G/DL (ref 31.5–36.5)
MCV RBC AUTO: 88 FL (ref 78–100)
METAMYELOCYTES # BLD: 0.1 10E9/L
METAMYELOCYTES NFR BLD MANUAL: 1 %
MONOCYTES # BLD AUTO: 0.9 10E9/L (ref 0–1.3)
MONOCYTES NFR BLD AUTO: 17 %
NEUTROPHILS # BLD AUTO: 1.8 10E9/L (ref 1.6–8.3)
NEUTROPHILS NFR BLD AUTO: 36 %
PLATELET # BLD AUTO: 150 10E9/L (ref 150–450)
PLATELET # BLD EST: ABNORMAL 10*3/UL
POTASSIUM SERPL-SCNC: 3.2 MMOL/L (ref 3.4–5.3)
PROT SERPL-MCNC: 6.8 G/DL (ref 6.8–8.8)
RBC # BLD AUTO: 4.28 10E12/L (ref 3.8–5.2)
SODIUM SERPL-SCNC: 136 MMOL/L (ref 133–144)
WBC # BLD AUTO: 5.1 10E9/L (ref 4–11)

## 2020-06-02 PROCEDURE — 99213 OFFICE O/P EST LOW 20 MIN: CPT | Performed by: INTERNAL MEDICINE

## 2020-06-02 PROCEDURE — G0463 HOSPITAL OUTPT CLINIC VISIT: HCPCS

## 2020-06-02 PROCEDURE — 83883 ASSAY NEPHELOMETRY NOT SPEC: CPT | Performed by: INTERNAL MEDICINE

## 2020-06-02 PROCEDURE — 36415 COLL VENOUS BLD VENIPUNCTURE: CPT

## 2020-06-02 PROCEDURE — 85025 COMPLETE CBC W/AUTO DIFF WBC: CPT | Performed by: INTERNAL MEDICINE

## 2020-06-02 PROCEDURE — 80053 COMPREHEN METABOLIC PANEL: CPT | Performed by: INTERNAL MEDICINE

## 2020-06-02 PROCEDURE — 00000402 ZZHCL STATISTIC TOTAL PROTEIN: Performed by: INTERNAL MEDICINE

## 2020-06-02 PROCEDURE — 82784 ASSAY IGA/IGD/IGG/IGM EACH: CPT | Performed by: INTERNAL MEDICINE

## 2020-06-02 PROCEDURE — 84165 PROTEIN E-PHORESIS SERUM: CPT | Performed by: INTERNAL MEDICINE

## 2020-06-02 RX ORDER — DEXAMETHASONE 4 MG/1
4 TABLET ORAL WEEKLY
Qty: 20 TABLET | Refills: 0 | Status: SHIPPED | OUTPATIENT
Start: 2020-06-02 | End: 2020-08-26

## 2020-06-02 RX ORDER — POTASSIUM CHLORIDE 1500 MG/1
20 TABLET, EXTENDED RELEASE ORAL 2 TIMES DAILY
Qty: 6 TABLET | Refills: 0 | Status: SHIPPED | OUTPATIENT
Start: 2020-06-02 | End: 2020-06-11

## 2020-06-02 ASSESSMENT — MIFFLIN-ST. JEOR: SCORE: 1094.55

## 2020-06-02 ASSESSMENT — PAIN SCALES - GENERAL: PAINLEVEL: NO PAIN (0)

## 2020-06-02 NOTE — PROGRESS NOTES
John Lambert RN,     Can you please call the patient. Her potassium is 3.2   I sent the potassium prescription to [facility]  (20 mEq p.o. twice daily x3 days)   Thank you ,    Juaquin RODRIGUEZ        Writer called and spoke with patitent and reviewed postassium result and the instructions for the prescription .    Patient verbalized understanding and will continue to eat her daily bananas as well.    Alecia Briggs RN

## 2020-06-02 NOTE — PROGRESS NOTES
Medical Assistant Note:  Quiana Dunaway presents today for BLOOD DRAW.    Patient seen by provider today: Yes: CHRIS.   present during visit today: Not Applicable.    Concerns: No Concerns.    Procedure:  Lab draw site: LEFT HAND, Needle type: BF, Gauge: 23.    Post Assessment:  Labs drawn without difficulty: Yes.    Discharge Plan:  Departure Mode: Ambulatory.    Face to Face Time: 5 MIN  .    Mariela Lyn, CMA

## 2020-06-02 NOTE — LETTER
"    6/2/2020         RE: Quiana Dunaway  [address] [facility] [address]        Dear Colleague,    Thank you for referring your patient, Quiana Dunaway, to the [facility]. Please see a copy of my visit note below.    Oncology Rooming Note    June 2, 2020 2:10 PM   Quiana Dunaway is a 84 year old female who presents for:    Chief Complaint   Patient presents with     Oncology Clinic Visit     Initial Vitals: There were no vitals taken for this visit. Estimated body mass index is 25.9 kg/m  as calculated from the following:    Height as of 5/24/20: 1.6 m (5' 3\").    Weight as of 5/26/20: 66.3 kg (146 lb 3.2 oz). There is no height or weight on file to calculate BSA.  Data Unavailable Comment: Data Unavailable   No LMP recorded. Patient is postmenopausal.  Allergies reviewed: Yes  Medications reviewed: Yes    Medications: Medication refills not needed today.  Pharmacy name entered into [facility]:    [id number] - [place], [place] - [address] [facility] [address]  [id number] - [place], [place] - [address] [facility] - [place], [place] - [address]    Clinical concerns:  doctor was notified.      Mariela Lyn, [facility]              Visit Date:   06/02/2020     HEMATOLOGY HISTORY: Mrs. Dunaway is a lady with multiple myeloma (IgG kappa). High risk, t(14;16).  1. On 05/01/2017, WBC of 3.4, hemoglobin of 10.2 and platelet of 154.  2. SPEP on 07/07/2017 revealed M-spike of 1.8.  3. Bone marrow biopsy on 07/12/2017 reveals kappa monotypic plasma cell population consistent with multiple myeloma.  Bone marrow is 70% cellular.  Plasma cell is 50-60%.     -There is gain of chromosome 1, 5, 9, 15, and 17.  There is translocation 14;16.   4.  On 07/18/2017:  -M-spike of 1.9.   -Immunofixation reveals monoclonal IgG kappa.    -IgG level of 2970.  IgA of 15 and IgM of 175.    -Kappa free light chain of 67.7.  Lambda " free light chain of 1.42.  Ratio of kappa to lambda of 47.71.    -Beta 2 microglobulin of 3.4.   5. PET scan on 07/24/2017 reveals several focal areas of increased FDG uptake consistent with multiple myeloma.  There is probable epithelial cyst in tail of the pancreas.  No associated abnormal FDG activity.  Left thyroid cysts or nodules without any FDG activity.  There is a 0.3 cm left upper lobe lung nodule.   8.  Velcade, Revlimid and dexamethasone between on 08/14/2017 and 04/30/2018. Velcade stopped.   -Revlimid and dexamethasone continued.     SUBJECTIVE:  Ms. Dunaway is an 84-year-old female with multiple myeloma.  She is currently on Revlimid and dexamethasone.      The patient lately has not been feeling well.  She was recently hospitalized for acute exacerbation of heart failure.      She has fatigue.  She gets tired very easily.  No headache.  No dizziness.  No chest pain.  No shortness of breath at rest.  She gets short of breath on exertion.  No nausea or vomiting.  Appetite is fairly good.  No urinary or bowel complaints.  No bleeding.  No fever or chills.      PHYSICAL EXAMINATION:   GENERAL:  The patient was alert, oriented x 3.   VITAL SIGNS:  Reviewed.  ECOG PS of 2.   EXTREMITIES:  No edema.   The rest of the systems not examined.      LABORATORY DATA:  Reviewed.      ASSESSMENT:   1.  An 84-year-old female with high-risk multiple myeloma.   2.  Shortness of breath secondary to congestive heart failure.   3.  Fatigue secondary to myeloma, Revlimid, old age, and cardiac disease.   4.  Mild hypokalemia.      PLAN:   1.  I discussed regarding her symptoms.  The patient has CHF.  Because of that, she has been short of breath.  She also has worsening fatigue.        I explained to the patient that Revlimid does have some cardiac side effects.  Her symptoms could have been exacerbated because of that.      2.  Discussed regarding treatment for multiple myeloma.  At this time, I am going to hold the  Revlimid for some time and see how she does.  I told the patient that since that Revlimid can cause cardiac problems, I want to hold it for some time.  She is agreeable for it.      Overall, her myeloma has been stable.  We will continue to monitor her labs closely.  When there is significant progression, we can restart Revlimid or go to another agent.  She is agreeable for it.      She is on dexamethasone 4 mg every week.  That will be continued.      3.  I will see her in mid-July with labs.  Advised her to call us with any questions or concerns.     ADDENDUM: Labs from today reveals minimal progression of myeloma. Will continue to monitor it.        YVES PEREZ MD             D: 2020   T: 2020   MT: BARRY      Name:     DUGLAS DUNAWAY   MRN:      [id number]        Account:      [id number]   :      1935           Visit Date:   2020      Document: [id number]         Visit Date:   2020     HEMATOLOGY HISTORY: Mrs. Dunaway is a lady with multiple myeloma (IgG kappa). High risk, t(14;16).  1. On 2017, WBC of 3.4, hemoglobin of 10.2 and platelet of 154.  2. SPEP on 2017 revealed M-spike of 1.8.  3. Bone marrow biopsy on 2017 reveals kappa monotypic plasma cell population consistent with multiple myeloma.  Bone marrow is 70% cellular.  Plasma cell is 50-60%.     -There is gain of chromosome 1, 5, 9, 15, and 17.  There is translocation 14;16.   4.  On 2017:  -M-spike of 1.9.   -Immunofixation reveals monoclonal IgG kappa.    -IgG level of 2970.  IgA of 15 and IgM of 175.    -Kappa free light chain of 67.7.  Lambda free light chain of 1.42.  Ratio of kappa to lambda of 47.71.    -Beta 2 microglobulin of 3.4.   5. PET scan on 2017 reveals several focal areas of increased FDG uptake consistent with multiple myeloma.  There is probable epithelial cyst in tail of the pancreas.  No associated abnormal FDG activity.  Left thyroid cysts or nodules without any  FDG activity.  There is a 0.3 cm left upper lobe lung nodule.   8.  Velcade, Revlimid and dexamethasone between on 08/14/2017 and 04/30/2018. Velcade stopped.   -Revlimid and dexamethasone continued.     SUBJECTIVE:  Ms. Dunaway is an 84-year-old female with multiple myeloma.  She is currently on Revlimid and dexamethasone.      The patient lately has not been feeling well.  She was recently hospitalized for acute exacerbation of heart failure.      She has fatigue.  She gets tired very easily.  No headache.  No dizziness.  No chest pain.  No shortness of breath at rest.  She gets short of breath on exertion.  No nausea or vomiting.  Appetite is fairly good.  No urinary or bowel complaints.  No bleeding.  No fever or chills.      PHYSICAL EXAMINATION:   GENERAL:  The patient was alert, oriented x 3.   VITAL SIGNS:  Reviewed.  ECOG PS of 2.   EXTREMITIES:  No edema.   The rest of the systems not examined.      LABORATORY DATA:  Reviewed.      ASSESSMENT:   1.  An 84-year-old female with high-risk multiple myeloma.   2.  Shortness of breath secondary to congestive heart failure.   3.  Fatigue secondary to myeloma, Revlimid, old age, and cardiac disease.   4.  Mild hypokalemia.      PLAN:   1.  I discussed regarding her symptoms.  The patient has CHF.  Because of that, she has been short of breath.  She also has worsening fatigue.        I explained to the patient that Revlimid does have some cardiac side effects.  Her symptoms could have been exacerbated because of that.      2.  Discussed regarding treatment for multiple myeloma.  At this time, I am going to hold the Revlimid for some time and see how she does.  I told the patient that since that Revlimid can cause cardiac problems, I want to hold it for some time.  She is agreeable for it.      Overall, her myeloma has been stable.  We will continue to monitor her labs closely.  When there is significant progression, we can restart Revlimid or go to another agent.   She is agreeable for it.      She is on dexamethasone 4 mg every week.  That will be continued.      3.  I will see her in mid-July with labs.  Advised her to call us with any questions or concerns.     ADDENDUM: Labs from today reveals minimal progression of myeloma. Will continue to monitor it.        YVES PEREZ MD             D: 2020   T: 2020   MT: BARRY      Name:     DUGLAS CASTILLO   MRN:      [id number]        Account:      [id number]   :      1935           Visit Date:   2020      Document: [id number]             Again, thank you for allowing me to participate in the care of your patient.        Sincerely,        Yves Perez MD

## 2020-06-02 NOTE — PATIENT INSTRUCTIONS
1. Hold revlimid.  2. Continue weekly dexamethasone.  3. Continue acyclovir.  4. Follow-up in mid-July with labs.    Please call to schedule

## 2020-06-02 NOTE — PROGRESS NOTES
"Oncology Rooming Note    June 2, 2020 2:10 PM   Quiana Dunaway is a 84 year old female who presents for:    Chief Complaint   Patient presents with     Oncology Clinic Visit     Initial Vitals: There were no vitals taken for this visit. Estimated body mass index is 25.9 kg/m  as calculated from the following:    Height as of 5/24/20: 1.6 m (5' 3\").    Weight as of 5/26/20: 66.3 kg (146 lb 3.2 oz). There is no height or weight on file to calculate BSA.  Data Unavailable Comment: Data Unavailable   No LMP recorded. Patient is postmenopausal.  Allergies reviewed: Yes  Medications reviewed: Yes    Medications: Medication refills not needed today.  Pharmacy name entered into [employer]:    [id number] - [place], [place] - [address] AT [facility] [address]  [facility] #[id number] - [place], [place] - [address] [facility] - [place], [place] - [address]    Clinical concerns:  doctor was notified.      Mariela Lyn CMA            "

## 2020-06-03 ENCOUNTER — VIRTUAL VISIT (OUTPATIENT)
Dept: CARDIOLOGY | Facility: CLINIC | Age: 85
End: 2020-06-03
Payer: MEDICARE

## 2020-06-03 DIAGNOSIS — R06.02 SHORTNESS OF BREATH: Primary | ICD-10-CM

## 2020-06-03 DIAGNOSIS — I48.0 PAROXYSMAL ATRIAL FIBRILLATION (H): ICD-10-CM

## 2020-06-03 LAB
ALBUMIN SERPL ELPH-MCNC: 3.9 G/DL (ref 3.7–5.1)
ALPHA1 GLOB SERPL ELPH-MCNC: 0.3 G/DL (ref 0.2–0.4)
ALPHA2 GLOB SERPL ELPH-MCNC: 0.8 G/DL (ref 0.5–0.9)
B-GLOBULIN SERPL ELPH-MCNC: 0.7 G/DL (ref 0.6–1)
GAMMA GLOB SERPL ELPH-MCNC: 0.7 G/DL (ref 0.7–1.6)
IGG SERPL-MCNC: 749 MG/DL (ref 610–1616)
KAPPA LC UR-MCNC: 13.81 MG/DL (ref 0.33–1.94)
KAPPA LC/LAMBDA SER: 17.71 {RATIO} (ref 0.26–1.65)
LAMBDA LC SERPL-MCNC: 0.78 MG/DL (ref 0.57–2.63)
M PROTEIN SERPL ELPH-MCNC: 0.5 G/DL
PROT PATTERN SERPL ELPH-IMP: ABNORMAL

## 2020-06-03 PROCEDURE — 99441 ZZC PHYSICIAN TELEPHONE EVALUATION 5-10 MIN: CPT | Performed by: NURSE PRACTITIONER

## 2020-06-03 NOTE — PROGRESS NOTES
"  The patient has been notified of following:     \"This telephone visit will be conducted via a call between you and your physician/provider. We have found that certain health care needs can be provided without the need for a physical exam.  This service lets us provide the care you need with a short phone conversation.  If a prescription is necessary we can send it directly to your pharmacy.  If lab work is needed we can place an order for that and you can then stop by our lab to have the test done at a later time.    Telephone visits are billed at different rates depending on your insurance coverage. During this emergency period, for some insurers they may be billed the same as an in-person visit.  Please reach out to your insurance provider with any questions.    If during the course of the call the physician/provider feels a telephone visit is not appropriate, you will not be charged for this service.\"    Patient has given verbal consent for Telephone visit?  Yes    What phone number would you like to be contacted at? [phone]    How would you like to obtain your AVS? MyChart    Blood pressure: Does not have a cuff at home  Pulse:  Weight: does not weigh at home    Phone call duration: 10 minutes    MARIUSZ CAMPBELL CNP     Reason for visit: Discharge follow up    Primary cardiologist: Dr. Worthington for EP    History of presenting illness:    Quiana Dunaway, a pleasant 84 year old patient who has a past medical history significant for hypertension, multiple myeloma (follows with Dr. 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    She was admitted to [facility] on 5/24/2020 with shortness of breath on exertion and elevated blood pressure.  An echocardiogram revealed preserved LVEF at 60 to 65% with grade 1 diastolic dysfunction and no " valvular regional wall motion abnormalities.  She was diuresed 7 pounds with a one-time dose of 20 mg of IV Lasix.  Her troponin was elevated at 0.68 and she underwent a nuclear stress test that was negative for inducible myocardial ischemia or infarction.    She was evaluated by Dr. Parry with oncology yesterday and Revlimid was discontinued continue due to its potential cardiac side effects.  Today she reports that her blood pressure is well controlled at 126/80 with a heart rate of 88 and her weight has been stable for the last week or so at about 139 pounds.  She denies any PND, orthopnea, chest pain, shortness of breath or lower extremity edema.         Assessment and Plan:     ASSESSMENT:    1. Heart failure with preserved LVEF, recent acute exacerbation    LVEF 60-65%    Diuresed with 1 time dose of IV Lasix    Lisinopril 10 mg daily and Lasix 20 mg daily    Weight stable without PND, orthopnea, shortness of breath or lower extremity edema    2. Non-STEMI, type II-demand    No chest pain symptoms    Troponin peak at 0.68    Lexiscan showed no evidence of ischemia or infarction    3. Paroxysmal atrial fibrillation    Not anticoagulated in the setting of multiple myeloma    CHADS2-VASc score of 4    4. History of symptomatic high degree AV block    Status post permanent pacemaker in 2017    5. Hypertension    Well-controlled    Lisinopril 10 mg daily    PLAN:     1. Continue present medical therapy  2. Follow-up with device clinic and Dr. Worthington in July as previously scheduled  3. Follow-up with general cardiology in 3 months       Bella Wilkerson, MARIUSZ, CNP

## 2020-06-03 NOTE — PROGRESS NOTES
Visit Date:   06/02/2020     HEMATOLOGY HISTORY: Mrs. Dunaway is a lady with multiple myeloma (IgG kappa). High risk, t(14;16).  1. On 05/01/2017, WBC of 3.4, hemoglobin of 10.2 and platelet of 154.  2. SPEP on 07/07/2017 revealed M-spike of 1.8.  3. Bone marrow biopsy on 07/12/2017 reveals kappa monotypic plasma cell population consistent with multiple myeloma.  Bone marrow is 70% cellular.  Plasma cell is 50-60%.     -There is gain of chromosome 1, 5, 9, 15, and 17.  There is translocation 14;16.   4.  On 07/18/2017:  -M-spike of 1.9.   -Immunofixation reveals monoclonal IgG kappa.    -IgG level of 2970.  IgA of 15 and IgM of 175.    -Kappa free light chain of 67.7.  Lambda free light chain of 1.42.  Ratio of kappa to lambda of 47.71.    -Beta 2 microglobulin of 3.4.   5. PET scan on 07/24/2017 reveals several focal areas of increased FDG uptake consistent with multiple myeloma.  There is probable epithelial cyst in tail of the pancreas.  No associated abnormal FDG activity.  Left thyroid cysts or nodules without any FDG activity.  There is a 0.3 cm left upper lobe lung nodule.   8.  Velcade, Revlimid and dexamethasone between on 08/14/2017 and 04/30/2018. Velcade stopped.   -Revlimid and dexamethasone continued.     SUBJECTIVE:  Ms. Dunaway is an 84-year-old female with multiple myeloma.  She is currently on Revlimid and dexamethasone.      The patient lately has not been feeling well.  She was recently hospitalized for acute exacerbation of heart failure.      She has fatigue.  She gets tired very easily.  No headache.  No dizziness.  No chest pain.  No shortness of breath at rest.  She gets short of breath on exertion.  No nausea or vomiting.  Appetite is fairly good.  No urinary or bowel complaints.  No bleeding.  No fever or chills.      PHYSICAL EXAMINATION:   GENERAL:  The patient was alert, oriented x 3.   VITAL SIGNS:  Reviewed.  ECOG PS of 2.   EXTREMITIES:  No edema.   The rest of the systems not  examined.      LABORATORY DATA:  Reviewed.      ASSESSMENT:   1.  An 84-year-old female with high-risk multiple myeloma.   2.  Shortness of breath secondary to congestive heart failure.   3.  Fatigue secondary to myeloma, Revlimid, old age, and cardiac disease.   4.  Mild hypokalemia.      PLAN:   1.  I discussed regarding her symptoms.  The patient has CHF.  Because of that, she has been short of breath.  She also has worsening fatigue.        I explained to the patient that Revlimid does have some cardiac side effects.  Her symptoms could have been exacerbated because of that.      2.  Discussed regarding treatment for multiple myeloma.  At this time, I am going to hold the Revlimid for some time and see how she does.  I told the patient that since that Revlimid can cause cardiac problems, I want to hold it for some time.  She is agreeable for it.      Overall, her myeloma has been stable.  We will continue to monitor her labs closely.  When there is significant progression, we can restart Revlimid or go to another agent.  She is agreeable for it.      She is on dexamethasone 4 mg every week.  That will be continued.      3.  I will see her in mid-July with labs.  Advised her to call us with any questions or concerns.     ADDENDUM: Labs from today reveals minimal progression of myeloma. Will continue to monitor it.        YVES PEREZ MD             D: 2020   T: 2020   MT: BARRY      Name:     DUGLAS CASTILLO   MRN:      [id number]        Account:      [id number]   :      1935           Visit Date:   2020      Document: [id number]

## 2020-06-03 NOTE — LETTER
6/3/2020    César Chung MD  [address]  [facility] [address]    RE: Quiana Dunaway       Dear Colleague,    I had the pleasure of seeing Quiana Dunaway in the [facility].      Reason for visit: Discharge follow up    Primary cardiologist: Dr. Worthington for EP    History of presenting illness:    Quiana Dunaway, a pleasant 84 year old patient who has a past medical history significant for hypertension, multiple myeloma (follows with Dr. 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    She was admitted to [facility] on 5/24/2020 with shortness of breath on exertion and elevated blood pressure.  An echocardiogram revealed preserved LVEF at 60 to 65% with grade 1 diastolic dysfunction and no valvular regional wall motion abnormalities.  She was diuresed 7 pounds with a one-time dose of 20 mg of IV Lasix.  Her troponin was elevated at 0.68 and she underwent a nuclear stress test that was negative for inducible myocardial ischemia or infarction.    She was evaluated by Dr. Parry with oncology yesterday and Revlimid was discontinued continue due to its potential cardiac side effects.  Today she reports that her blood pressure is well controlled at 126/80 with a heart rate of 88 and her weight has been stable for the last week or so at about 139 pounds.  She denies any PND, orthopnea, chest pain, shortness of breath or lower extremity edema.         Assessment and Plan:     ASSESSMENT:    1. Heart failure with preserved LVEF, recent acute exacerbation    LVEF 60-65%    Diuresed with 1 time dose of IV Lasix    Lisinopril 10 mg daily and Lasix 20 mg daily    Weight stable without PND, orthopnea, shortness of breath or lower extremity edema    2. Non-STEMI, type II-demand    No chest pain  symptoms    Troponin peak at 0.68    Lexiscan showed no evidence of ischemia or infarction    3. Paroxysmal atrial fibrillation    Not anticoagulated in the setting of multiple myeloma    CHADS2-VASc score of 4    4. History of symptomatic high degree AV block    Status post permanent pacemaker in 2017    5. Hypertension    Well-controlled    Lisinopril 10 mg daily    PLAN:     1. Continue present medical therapy  2. Follow-up with device clinic and Dr. Worthington in July as previously scheduled  3. Follow-up with general cardiology in 3 months         Thank you for allowing me to participate in the care of your patient.    Sincerely,     MARIUSZ CAMPBELL [facility]

## 2020-06-04 NOTE — RESULT ENCOUNTER NOTE
Dear Ms. Dunaway,    Blood tests reveal myeloma to be stable.    Please, call me with any questions.    Alex Parry MD

## 2020-06-07 NOTE — PROGRESS NOTES
Visit Date:   06/02/2020     HEMATOLOGY HISTORY: Mrs. Dunaway is a lady with multiple myeloma (IgG kappa). High risk, t(14;16).  1. On 05/01/2017, WBC of 3.4, hemoglobin of 10.2 and platelet of 154.  2. SPEP on 07/07/2017 revealed M-spike of 1.8.  3. Bone marrow biopsy on 07/12/2017 reveals kappa monotypic plasma cell population consistent with multiple myeloma.  Bone marrow is 70% cellular.  Plasma cell is 50-60%.     -There is gain of chromosome 1, 5, 9, 15, and 17.  There is translocation 14;16.   4.  On 07/18/2017:  -M-spike of 1.9.   -Immunofixation reveals monoclonal IgG kappa.    -IgG level of 2970.  IgA of 15 and IgM of 175.    -Kappa free light chain of 67.7.  Lambda free light chain of 1.42.  Ratio of kappa to lambda of 47.71.    -Beta 2 microglobulin of 3.4.   5. PET scan on 07/24/2017 reveals several focal areas of increased FDG uptake consistent with multiple myeloma.  There is probable epithelial cyst in tail of the pancreas.  No associated abnormal FDG activity.  Left thyroid cysts or nodules without any FDG activity.  There is a 0.3 cm left upper lobe lung nodule.   8.  Velcade, Revlimid and dexamethasone between on 08/14/2017 and 04/30/2018. Velcade stopped.   -Revlimid and dexamethasone continued.     SUBJECTIVE:  Ms. Dunaway is an 84-year-old female with multiple myeloma.  She is currently on Revlimid and dexamethasone.      The patient lately has not been feeling well.  She was recently hospitalized for acute exacerbation of heart failure.      She has fatigue.  She gets tired very easily.  No headache.  No dizziness.  No chest pain.  No shortness of breath at rest.  She gets short of breath on exertion.  No nausea or vomiting.  Appetite is fairly good.  No urinary or bowel complaints.  No bleeding.  No fever or chills.      PHYSICAL EXAMINATION:   GENERAL:  The patient was alert, oriented x 3.   VITAL SIGNS:  Reviewed.  ECOG PS of 2.   EXTREMITIES:  No edema.   The rest of the systems not  examined.      LABORATORY DATA:  Reviewed.      ASSESSMENT:   1.  An 84-year-old female with high-risk multiple myeloma.   2.  Shortness of breath secondary to congestive heart failure.   3.  Fatigue secondary to myeloma, Revlimid, old age, and cardiac disease.   4.  Mild hypokalemia.      PLAN:   1.  I discussed regarding her symptoms.  The patient has CHF.  Because of that, she has been short of breath.  She also has worsening fatigue.        I explained to the patient that Revlimid does have some cardiac side effects.  Her symptoms could have been exacerbated because of that.      2.  Discussed regarding treatment for multiple myeloma.  At this time, I am going to hold the Revlimid for some time and see how she does.  I told the patient that since that Revlimid can cause cardiac problems, I want to hold it for some time.  She is agreeable for it.      Overall, her myeloma has been stable.  We will continue to monitor her labs closely.  When there is significant progression, we can restart Revlimid or go to another agent.  She is agreeable for it.      She is on dexamethasone 4 mg every week.  That will be continued.      3.  I will see her in mid-July with labs.  Advised her to call us with any questions or concerns.     ADDENDUM: Labs from today reveals minimal progression of myeloma. Will continue to monitor it.        YVES PEREZ MD             D: 2020   T: 2020   MT: BARRY      Name:     DUGLAS CASTILLO   MRN:      [id number]        Account:      [id number]   :      1935           Visit Date:   2020      Document: [id number]

## 2020-06-11 ENCOUNTER — VIRTUAL VISIT (OUTPATIENT)
Dept: FAMILY MEDICINE | Facility: CLINIC | Age: 85
End: 2020-06-11
Payer: MEDICARE

## 2020-06-11 DIAGNOSIS — I50.32 CHRONIC HEART FAILURE WITH PRESERVED EJECTION FRACTION (H): Primary | ICD-10-CM

## 2020-06-11 DIAGNOSIS — C90.00 MULTIPLE MYELOMA NOT HAVING ACHIEVED REMISSION (H): ICD-10-CM

## 2020-06-11 DIAGNOSIS — I10 BENIGN ESSENTIAL HYPERTENSION: ICD-10-CM

## 2020-06-11 DIAGNOSIS — E87.6 HYPOKALEMIA: ICD-10-CM

## 2020-06-11 DIAGNOSIS — N18.30 CKD (CHRONIC KIDNEY DISEASE) STAGE 3, GFR 30-59 ML/MIN (H): ICD-10-CM

## 2020-06-11 DIAGNOSIS — F41.1 GAD (GENERALIZED ANXIETY DISORDER): ICD-10-CM

## 2020-06-11 DIAGNOSIS — R06.02 SHORTNESS OF BREATH: ICD-10-CM

## 2020-06-11 PROCEDURE — 99443 ZZC PHYSICIAN TELEPHONE EVALUATION 21-30 MIN: CPT | Performed by: INTERNAL MEDICINE

## 2020-06-11 RX ORDER — FUROSEMIDE 20 MG
20 TABLET ORAL DAILY
Qty: 90 TABLET | Refills: 3 | Status: SHIPPED | OUTPATIENT
Start: 2020-06-11 | End: 2021-04-14

## 2020-06-11 RX ORDER — POTASSIUM CHLORIDE 1500 MG/1
20 TABLET, EXTENDED RELEASE ORAL DAILY
Qty: 90 TABLET | Refills: 3 | Status: SHIPPED | OUTPATIENT
Start: 2020-06-11 | End: 2020-08-26

## 2020-06-11 NOTE — PROGRESS NOTES
"Quiana Dunaway is a 84 year old female who is being evaluated via a billable telephone visit.      The patient has been notified of following:     \"This telephone visit will be conducted via a call between you and your physician/provider. We have found that certain health care needs can be provided without the need for a physical exam.  This service lets us provide the care you need with a short phone conversation.  If a prescription is necessary we can send it directly to your pharmacy.  If lab work is needed we can place an order for that and you can then stop by our lab to have the test done at a later time.    Telephone visits are billed at different rates depending on your insurance coverage. During this emergency period, for some insurers they may be billed the same as an in-person visit.  Please reach out to your insurance provider with any questions.    If during the course of the call the physician/provider feels a telephone visit is not appropriate, you will not be charged for this service.\"    Patient has given verbal consent for Telephone visit?  Yes    What phone number would you like to be contacted at? [phone]    How would you like to obtain your AVS? Paige Cavazos     Quiana Dunaway is a 84 year old female who presents via phone visit today for the following health issues:    HPI    CC:  Follow up heart failure, hypertension, hypokalemia, myeloma, anxiety  She continues to feel improvement  She denies dyspnea, orthopnea or PND  She still has intermittent mild leg swelling  She was noted to have low K and was treated with oral K for 3 days  If she is to stay on lasix she will need a refill  Her most recent cardiology visit suggested she should stay on current medical therapy  She does weigh herself daily and her weight has been stable  Her blood pressure has been well controlled  She is not having any significant side effects from the furosemide such as dizziness etc.  She does not " like that she has to urinate more in the mornings while taking it      Patient Active Problem List   Diagnosis     Benign essential hypertension     Multiple myeloma not having achieved remission (H)     Pacemaker     ROBER (generalized anxiety disorder)     CKD (chronic kidney disease) stage 3, GFR 30-59 ml/min (H)     Past Surgical History:   Procedure Laterality Date     ------------OTHER-------------      cataract eye surgery     BONE MARROW BIOPSY, BONE SPECIMEN, NEEDLE/TROCAR N/A 7/12/2017    Procedure: BIOPSY BONE MARROW;  BONE MARROW BIOPSY ;  Surgeon: Grace Vela MD;  Location:  GI     IMPLANT PACEMAKER  03/2017    AV block       Social History     Tobacco Use     Smoking status: Never Smoker     Smokeless tobacco: Never Used   Substance Use Topics     Alcohol use: No     Alcohol/week: 0.0 standard drinks     Family History   Problem Relation Age of Onset     Unknown/Adopted Mother      Unknown/Adopted Father          Current Outpatient Medications   Medication Sig Dispense Refill     acyclovir (ZOVIRAX) 400 MG tablet Take 1 tablet (400 mg) by mouth 2 times daily Viral Prophylaxis. 60 tablet 5     aspirin 81 MG chewable tablet Take 81 mg by mouth daily        dexamethasone (DECADRON) 4 MG tablet Take 1 tablet (4 mg) by mouth once a week Once a week with food on Days 1,8,15. 20 tablet 0     furosemide (LASIX) 20 MG tablet Take 1 tablet (20 mg) by mouth daily 90 tablet 3     lisinopril (ZESTRIL) 10 MG tablet TAKE 1 TABLET BY MOUTH DAILY 90 tablet 3     Loperamide HCl (IMODIUM A-D PO) Take 1 tablet by mouth as needed        MYRBETRIQ 25 MG 24 hr tablet TAKE ONE TABLET BY MOUTH ONCE DAILY 90 tablet 1     nitroglycerin (NITROSTAT) 0.4 MG sublingual tablet For chest pain place 1 tablet under the tongue every 5 minutes for 3 doses. If symptoms persist 5 minutes after 1st dose call 911. 25 tablet 0     potassium chloride ER (KLOR-CON M) 20 MEQ CR tablet Take 1 tablet (20 mEq) by mouth daily 90 tablet 3      prochlorperazine (COMPAZINE) 10 MG tablet Take 1 tablet (10 mg) by mouth every 6 hours as needed (Nausea/Vomiting) 30 tablet 6     Zoledronic Acid (ZOMETA IV) Inject 4 mg into the vein every 3 months        Allergies   Allergen Reactions     Levaquin [Levofloxacin] Other (See Comments)     Tingling in feet after 1 dose on 8/7/19         Reviewed and updated as needed this visit by Provider         Review of Systems   Constitutional, HEENT, cardiovascular, pulmonary, gi and gu systems are negative, except as otherwise noted.       Objective   Reported vitals:  There were no vitals taken for this visit.   She sounds healthy, alert and no distress  PSYCH: Alert and oriented times 3; coherent speech, normal   rate and volume, able to articulate logical thoughts, able   to abstract reason, no tangential thoughts, no hallucinations   or delusions  Her affect is normal  RESP: No cough, no audible wheezing, able to talk in full sentences  Remainder of exam unable to be completed due to telephone visits    Diagnostic Test Results:  Labs reviewed in Epic        Assessment/Plan:  1. Chronic heart failure with preserved ejection fraction (H)      2. CKD (chronic kidney disease) stage 3, GFR 30-59 ml/min (H)      3. Multiple myeloma not having achieved remission (H)      4. Benign essential hypertension      5. ROBER (generalized anxiety disorder)      6. Hypokalemia        Heart failure seems stable  However, I think she will need to stay on diuretic to maintain euvolemia and stay out of the hospital  She will also need a K supplement to prevent hypokalemia associated with diuretic therapy   I will send Rxs for both medications to her pharmacy  Her myeloma has been stable and she will continue follow up with Dr. Parry's team as directed  Her blood pressure is well controlled and the diuretic will insure good systolic blood pressure control   Her anxiety symptoms are well controlled       Return in about 1 month (around  7/11/2020) for recheck .  Patient instructed to return to clinic or contact us sooner if symptoms worsen or new symptoms develop.       Phone call duration:  21 minutes    César Chung MD

## 2020-06-16 ENCOUNTER — HOSPITAL ENCOUNTER (OUTPATIENT)
Dept: ULTRASOUND IMAGING | Facility: CLINIC | Age: 85
Discharge: HOME | End: 2020-06-16
Attending: INTERNAL MEDICINE | Admitting: INTERNAL MEDICINE
Payer: MEDICARE

## 2020-06-16 DIAGNOSIS — M79.89 RIGHT LEG SWELLING: ICD-10-CM

## 2020-06-16 PROCEDURE — 93971 EXTREMITY STUDY: CPT | Mod: RT

## 2020-06-16 NOTE — RESULT ENCOUNTER NOTE
The following letter pertains to your most recent diagnostic tests:    Good news! The ultrasound is negative for a dangerous blood clot in the leg.  I would try to keep the leg elevated as much as you can when you are not walking.  I would also recommend using an elastic compression stocking during the day to help with the swelling.       Sincerely,    Dr. Chung

## 2020-06-16 NOTE — LETTER
June 16, 2020      Quiana Dunaway  [address] [place] [facility] [address]      Dear ,    We are writing to inform you of your test results.    Good news! The ultrasound is negative for a dangerous blood clot in the leg.  I would try to keep the leg elevated as much as you can when you are not walking.  I would also recommend using an elastic compression stocking during the day to help with the swelling.     Resulted Orders   US Lower Extremity Venous Duplex Right    Narrative    ULTRASOUND RIGHT LOWER EXTREMITY VENOUS DUPLEX  6/16/2020 12:13 PM     HISTORY: Right leg swelling.    COMPARISON: None.    FINDINGS: Gray-scale, color and Doppler spectral analysis ultrasound  was performed of the right lower extremity. Compression and  augmentation imaging was performed.    No evidence of deep venous thrombosis in the right lower extremity.       Impression    IMPRESSION: No evidence of deep venous thrombosis in the right lower  extremity.     JOSE ANTONIO MORROW MD     If you have any questions or concerns, please call the clinic at the number listed above.     Sincerely,      Dr. Juliet MD/ deja marie

## 2020-06-30 ENCOUNTER — PATIENT OUTREACH (OUTPATIENT)
Dept: CARE COORDINATION | Facility: CLINIC | Age: 85
End: 2020-06-30

## 2020-06-30 ASSESSMENT — ACTIVITIES OF DAILY LIVING (ADL): DEPENDENT_IADLS:: INDEPENDENT

## 2020-06-30 NOTE — PROGRESS NOTES
Clinic Care Coordination Contact  [facility]/Voicemail    Referral Source: IP Report  Date of Admission:  5/24/2020 Discharge:  5/26/2020  4:56 PM  Discharging Provider: Addison Ray MD        Discharge Diagnoses     1. Acute exacerbation of heart failure with preserved ejection failure  2. Type 2 NSTEMI - demand ischemia  3. History of symptomatic high degree AV block s/p PPM March 2017  4. Hypertension  5. Paroxysmal atrial fibrillation  6. History of multiple myeloma  7. Hypokalemia  Clinical Data: Care Coordinator Outreach  Outreach attempted x 1.  Left message on patient's voicemail with call back information and requested return call.  Plan:Care Coordinator will try to reach patient again in 3-5 business days.  [facility]     Shannon Lemons RN Care Coordinator   [facility] / [facility]   Phone: [phone]  Email :  [email or web address]

## 2020-07-13 ENCOUNTER — HOSPITAL ENCOUNTER (OUTPATIENT)
Facility: CLINIC | Age: 85
Setting detail: SPECIMEN
Discharge: HOME | End: 2020-07-13
Attending: INTERNAL MEDICINE | Admitting: INTERNAL MEDICINE
Payer: MEDICARE

## 2020-07-13 ENCOUNTER — INFUSION THERAPY VISIT (OUTPATIENT)
Dept: INFUSION THERAPY | Facility: CLINIC | Age: 85
End: 2020-07-13
Attending: INTERNAL MEDICINE
Payer: MEDICARE

## 2020-07-13 DIAGNOSIS — C90.00 MULTIPLE MYELOMA NOT HAVING ACHIEVED REMISSION (H): ICD-10-CM

## 2020-07-13 LAB
ALBUMIN SERPL-MCNC: 3.6 G/DL (ref 3.4–5)
ALP SERPL-CCNC: 86 U/L (ref 40–150)
ALT SERPL W P-5'-P-CCNC: 23 U/L (ref 0–50)
ANION GAP SERPL CALCULATED.3IONS-SCNC: 6 MMOL/L (ref 3–14)
AST SERPL W P-5'-P-CCNC: 12 U/L (ref 0–45)
BASOPHILS # BLD AUTO: 0 10E9/L (ref 0–0.2)
BASOPHILS NFR BLD AUTO: 0.2 %
BILIRUB SERPL-MCNC: 0.6 MG/DL (ref 0.2–1.3)
BUN SERPL-MCNC: 26 MG/DL (ref 7–30)
CALCIUM SERPL-MCNC: 9.6 MG/DL (ref 8.5–10.1)
CHLORIDE SERPL-SCNC: 106 MMOL/L (ref 94–109)
CO2 SERPL-SCNC: 27 MMOL/L (ref 20–32)
CREAT SERPL-MCNC: 0.72 MG/DL (ref 0.52–1.04)
DIFFERENTIAL METHOD BLD: NORMAL
EOSINOPHIL # BLD AUTO: 0 10E9/L (ref 0–0.7)
EOSINOPHIL NFR BLD AUTO: 0.2 %
ERYTHROCYTE [DISTWIDTH] IN BLOOD BY AUTOMATED COUNT: 13.2 % (ref 10–15)
GFR SERPL CREATININE-BSD FRML MDRD: 77 ML/MIN/{1.73_M2}
GLUCOSE SERPL-MCNC: 140 MG/DL (ref 70–99)
HCT VFR BLD AUTO: 38.6 % (ref 35–47)
HGB BLD-MCNC: 13.2 G/DL (ref 11.7–15.7)
IMM GRANULOCYTES # BLD: 0 10E9/L (ref 0–0.4)
IMM GRANULOCYTES NFR BLD: 0.5 %
LYMPHOCYTES # BLD AUTO: 0.9 10E9/L (ref 0.8–5.3)
LYMPHOCYTES NFR BLD AUTO: 14.3 %
MCH RBC QN AUTO: 30.7 PG (ref 26.5–33)
MCHC RBC AUTO-ENTMCNC: 34.2 G/DL (ref 31.5–36.5)
MCV RBC AUTO: 90 FL (ref 78–100)
MONOCYTES # BLD AUTO: 0.1 10E9/L (ref 0–1.3)
MONOCYTES NFR BLD AUTO: 1.3 %
NEUTROPHILS # BLD AUTO: 5 10E9/L (ref 1.6–8.3)
NEUTROPHILS NFR BLD AUTO: 83.5 %
PLATELET # BLD AUTO: 217 10E9/L (ref 150–450)
POTASSIUM SERPL-SCNC: 4.1 MMOL/L (ref 3.4–5.3)
PROT SERPL-MCNC: 7.7 G/DL (ref 6.8–8.8)
RBC # BLD AUTO: 4.3 10E12/L (ref 3.8–5.2)
SODIUM SERPL-SCNC: 139 MMOL/L (ref 133–144)
WBC # BLD AUTO: 6 10E9/L (ref 4–11)

## 2020-07-13 PROCEDURE — 00000402 ZZHCL STATISTIC TOTAL PROTEIN: Performed by: INTERNAL MEDICINE

## 2020-07-13 PROCEDURE — 82784 ASSAY IGA/IGD/IGG/IGM EACH: CPT | Performed by: INTERNAL MEDICINE

## 2020-07-13 PROCEDURE — 36415 COLL VENOUS BLD VENIPUNCTURE: CPT

## 2020-07-13 PROCEDURE — 83883 ASSAY NEPHELOMETRY NOT SPEC: CPT | Performed by: INTERNAL MEDICINE

## 2020-07-13 PROCEDURE — 85025 COMPLETE CBC W/AUTO DIFF WBC: CPT | Performed by: INTERNAL MEDICINE

## 2020-07-13 PROCEDURE — 84165 PROTEIN E-PHORESIS SERUM: CPT | Performed by: INTERNAL MEDICINE

## 2020-07-13 PROCEDURE — 80053 COMPREHEN METABOLIC PANEL: CPT | Performed by: INTERNAL MEDICINE

## 2020-07-14 LAB
ALBUMIN SERPL ELPH-MCNC: 4.1 G/DL (ref 3.7–5.1)
ALPHA1 GLOB SERPL ELPH-MCNC: 0.4 G/DL (ref 0.2–0.4)
ALPHA2 GLOB SERPL ELPH-MCNC: 0.9 G/DL (ref 0.5–0.9)
B-GLOBULIN SERPL ELPH-MCNC: 0.8 G/DL (ref 0.6–1)
GAMMA GLOB SERPL ELPH-MCNC: 1.1 G/DL (ref 0.7–1.6)
IGG SERPL-MCNC: 1147 MG/DL (ref 610–1616)
KAPPA LC UR-MCNC: 27.37 MG/DL (ref 0.33–1.94)
KAPPA LC/LAMBDA SER: 88.29 {RATIO} (ref 0.26–1.65)
LAMBDA LC SERPL-MCNC: 0.31 MG/DL (ref 0.57–2.63)
M PROTEIN SERPL ELPH-MCNC: 0.8 G/DL
PROT PATTERN SERPL ELPH-IMP: ABNORMAL

## 2020-07-15 ENCOUNTER — TELEPHONE (OUTPATIENT)
Dept: CARDIOLOGY | Facility: CLINIC | Age: 85
End: 2020-07-15

## 2020-07-15 ENCOUNTER — VIRTUAL VISIT (OUTPATIENT)
Dept: FAMILY MEDICINE | Facility: CLINIC | Age: 85
End: 2020-07-15
Payer: MEDICARE

## 2020-07-15 ENCOUNTER — TELEPHONE (OUTPATIENT)
Dept: FAMILY MEDICINE | Facility: CLINIC | Age: 85
End: 2020-07-15

## 2020-07-15 ENCOUNTER — INFUSION THERAPY VISIT (OUTPATIENT)
Dept: INFUSION THERAPY | Facility: CLINIC | Age: 85
End: 2020-07-15
Attending: INTERNAL MEDICINE
Payer: MEDICARE

## 2020-07-15 ENCOUNTER — ONCOLOGY VISIT (OUTPATIENT)
Dept: ONCOLOGY | Facility: CLINIC | Age: 85
End: 2020-07-15
Attending: INTERNAL MEDICINE
Payer: MEDICARE

## 2020-07-15 VITALS
HEIGHT: 63 IN | OXYGEN SATURATION: 95 % | WEIGHT: 148 LBS | BODY MASS INDEX: 26.22 KG/M2 | DIASTOLIC BLOOD PRESSURE: 67 MMHG | SYSTOLIC BLOOD PRESSURE: 125 MMHG | TEMPERATURE: 98.5 F | RESPIRATION RATE: 16 BRPM | HEART RATE: 88 BPM

## 2020-07-15 DIAGNOSIS — I50.32 CHRONIC DIASTOLIC CONGESTIVE HEART FAILURE (H): ICD-10-CM

## 2020-07-15 DIAGNOSIS — R53.83 FATIGUE, UNSPECIFIED TYPE: ICD-10-CM

## 2020-07-15 DIAGNOSIS — C90.00 MULTIPLE MYELOMA NOT HAVING ACHIEVED REMISSION (H): Primary | ICD-10-CM

## 2020-07-15 DIAGNOSIS — M54.9 ACUTE BACK PAIN, UNSPECIFIED BACK LOCATION, UNSPECIFIED BACK PAIN LATERALITY: Primary | ICD-10-CM

## 2020-07-15 DIAGNOSIS — C90.00 MULTIPLE MYELOMA NOT HAVING ACHIEVED REMISSION (H): ICD-10-CM

## 2020-07-15 PROCEDURE — 25000128 H RX IP 250 OP 636: Performed by: INTERNAL MEDICINE

## 2020-07-15 PROCEDURE — 96374 THER/PROPH/DIAG INJ IV PUSH: CPT

## 2020-07-15 PROCEDURE — 99214 OFFICE O/P EST MOD 30 MIN: CPT | Mod: 95 | Performed by: INTERNAL MEDICINE

## 2020-07-15 PROCEDURE — G0463 HOSPITAL OUTPT CLINIC VISIT: HCPCS | Mod: 25

## 2020-07-15 PROCEDURE — 25800030 ZZH RX IP 258 OP 636: Performed by: INTERNAL MEDICINE

## 2020-07-15 PROCEDURE — 99214 OFFICE O/P EST MOD 30 MIN: CPT | Performed by: INTERNAL MEDICINE

## 2020-07-15 RX ORDER — ZOLEDRONIC ACID 0.04 MG/ML
4 INJECTION, SOLUTION INTRAVENOUS ONCE
Status: CANCELLED | OUTPATIENT
Start: 2020-07-15

## 2020-07-15 RX ORDER — PROCHLORPERAZINE MALEATE 10 MG
10 TABLET ORAL EVERY 6 HOURS PRN
Qty: 30 TABLET | Refills: 6 | Status: SHIPPED | OUTPATIENT
Start: 2020-07-15 | End: 2020-08-26

## 2020-07-15 RX ORDER — LORAZEPAM 0.5 MG/1
0.5 TABLET ORAL EVERY 6 HOURS PRN
COMMUNITY
End: 2020-07-15

## 2020-07-15 RX ORDER — LORAZEPAM 0.5 MG/1
0.5 TABLET ORAL EVERY 6 HOURS PRN
Qty: 30 TABLET | Refills: 3 | Status: SHIPPED | OUTPATIENT
Start: 2020-07-15 | End: 2021-02-11

## 2020-07-15 RX ADMIN — ZOLEDRONIC ACID 3.5 MG: 4 INJECTION, SOLUTION, CONCENTRATE INTRAVENOUS at 13:52

## 2020-07-15 ASSESSMENT — PAIN SCALES - GENERAL: PAINLEVEL: NO PAIN (0)

## 2020-07-15 ASSESSMENT — MIFFLIN-ST. JEOR: SCORE: 1095.45

## 2020-07-15 NOTE — PATIENT INSTRUCTIONS
1. Start revlimid.  2. Zometa today.  3. Continue acyclovir.  4. Continue aspirin.  5. See Juaquin Hammond next month with labs.  6. See me in 3 months.  7. Labs as per treatment protocol.    Patient in [facility]

## 2020-07-15 NOTE — LETTER
"    7/15/2020         RE: Quiana Dunaway  [address] [facility] [address]        Dear Colleague,    Thank you for referring your patient, Quiana Dunaway, to the [facility]. Please see a copy of my visit note below.    Oncology Rooming Note    July 15, 2020 1:05 PM   Quiana Dunaway is a 84 year old female who presents for:    Chief Complaint   Patient presents with     Oncology Clinic Visit     Initial Vitals: /67   Pulse 88   Temp 98.5  F (36.9  C) (Oral)   Resp 16   Ht 1.6 m (5' 3\")   Wt 67.1 kg (148 lb)   SpO2 95%   BMI 26.22 kg/m   Estimated body mass index is 26.22 kg/m  as calculated from the following:    Height as of this encounter: 1.6 m (5' 3\").    Weight as of this encounter: 67.1 kg (148 lb). Body surface area is 1.73 meters squared.  No Pain (0) Comment: Data Unavailable   No LMP recorded. Patient is postmenopausal.  Allergies reviewed: Yes  Medications reviewed: Yes    Medications: Medication refills not needed today.  Pharmacy name entered into [employer]:    [id number] - [place], [place] - [address] [facility] [address]  [id number] - [place], [place] - [address] [facility] - [place], [place] - [address]    Clinical concerns: no      Lauren Arredondo, [facility]              Visit Date:   07/15/2020     HEMATOLOGY HISTORY: Mrs. Dunaway is a lady with multiple myeloma (IgG kappa). High risk, t(14;16).  1. On 05/01/2017, WBC of 3.4, hemoglobin of 10.2 and platelet of 154.  2. SPEP on 07/07/2017 revealed M-spike of 1.8.  3. Bone marrow biopsy on 07/12/2017 reveals kappa monotypic plasma cell population consistent with multiple myeloma.  Bone marrow is 70% cellular.  Plasma cell is 50-60%.     -There is gain of chromosome 1, 5, 9, 15, and 17.  There is translocation 14;16.   4.  On 07/18/2017:  -M-spike of 1.9.   -Immunofixation reveals monoclonal IgG kappa.    -IgG level of 2970. "  IgA of 15 and IgM of 175.    -Kappa free light chain of 67.7.  Lambda free light chain of 1.42.  Ratio of kappa to lambda of 47.71.    -Beta 2 microglobulin of 3.4.   5. PET scan on 07/24/2017 reveals several focal areas of increased FDG uptake consistent with multiple myeloma.  There is probable epithelial cyst in tail of the pancreas.  No associated abnormal FDG activity.  Left thyroid cysts or nodules without any FDG activity.  There is a 0.3 cm left upper lobe lung nodule.   8.  Velcade, Revlimid and dexamethasone between on 08/14/2017 and 04/30/2018. Velcade stopped.   -Revlimid and dexamethasone continued.     SUBJECTIVE:  Ms. Dunaway is an 84-year-old female with high-risk multiple myeloma.  She was on Revlimid and dexamethasone.  It is on hold since 06/02/2020.  It was put on hold as she had acute exacerbation of heart failure.      The patient is better.  Her cardiac status is better.  She has some fatigue.  No headache.  No dizziness.  No chest pain.  Shortness of breath is much improved.  No abdominal pain, nausea or vomiting.  No urinary or bowel complaints.  No bleeding.  No fever, chills or night sweats.  Appetite has been good.      PHYSICAL EXAMINATION:   GENERAL:  She is alert, oriented x 3.   VITAL SIGNS:  Reviewed, ECOG PS of 2.   The rest of the system not examined.      LABORATORY DATA:  Reviewed.      ASSESSMENT:   1.  An 84-year-old female with multiple myeloma, which is progressing.   2.  Congestive heart failure under control.   3.  Fatigue.      PLAN:   1.  Labs were all reviewed with the patient.  I explained to her that her myeloma is progressing.  [place] free light chain and IgG has increased.  Discussed regarding resuming Revlimid.  She is agreeable for it.  She will go back on Revlimid 10 mg a day.  She will continue on weekly dexamethasone 20 mg a week.  Side effects reviewed.  She will let us know if she has any side effects.   2.  She will continue on Zometa every 3 months.  She  will get one today.  She does not have any jaw or dental related symptoms.   3.  She will continue on prophylactic aspirin and acyclovir.   4.  I will see her in 3 months.  In between, she will see our nurse practitioner.  I advised the patient to go to the emergency room if she has worsening shortness of breath, chest pain, pain/swelling/redness in the extremities or any other concerns.      TOTAL FACE-TO-FACE TIME SPENT:  25 minutes, more than 50% of the time spent in counseling and coordination of care.         YVES PEREZ MD             D: 2020   T: 2020   MT: ESTELLA      Name:     DUGLAS DUNAWAY   MRN:      [id number]        Account:      [id number]   :      1935           Visit Date:   07/15/2020      Document: [id number]      Visit Date:   07/15/2020     HEMATOLOGY HISTORY: Mrs. Dunaway is a lady with multiple myeloma (IgG kappa). High risk, t(14;16).  1. On 2017, WBC of 3.4, hemoglobin of 10.2 and platelet of 154.  2. SPEP on 2017 revealed M-spike of 1.8.  3. Bone marrow biopsy on 2017 reveals kappa monotypic plasma cell population consistent with multiple myeloma.  Bone marrow is 70% cellular.  Plasma cell is 50-60%.     -There is gain of chromosome 1, 5, 9, 15, and 17.  There is translocation 14;16.   4.  On 2017:  -M-spike of 1.9.   -Immunofixation reveals monoclonal IgG kappa.    -IgG level of 2970.  IgA of 15 and IgM of 175.    -Kappa free light chain of 67.7.  Lambda free light chain of 1.42.  Ratio of kappa to lambda of 47.71.    -Beta 2 microglobulin of 3.4.   5. PET scan on 2017 reveals several focal areas of increased FDG uptake consistent with multiple myeloma.  There is probable epithelial cyst in tail of the pancreas.  No associated abnormal FDG activity.  Left thyroid cysts or nodules without any FDG activity.  There is a 0.3 cm left upper lobe lung nodule.   8.  Velcade, Revlimid and dexamethasone between on 2017 and 2018.  Velcade stopped.   -Revlimid and dexamethasone continued.     SUBJECTIVE:  Ms. Dunaway is an 84-year-old female with high-risk multiple myeloma.  She was on Revlimid and dexamethasone.  It is on hold since 06/02/2020.  It was put on hold as she had acute exacerbation of heart failure.      The patient is better.  Her cardiac status is better.  She has some fatigue.  No headache.  No dizziness.  No chest pain.  Shortness of breath is much improved.  No abdominal pain, nausea or vomiting.  No urinary or bowel complaints.  No bleeding.  No fever, chills or night sweats.  Appetite has been good.      PHYSICAL EXAMINATION:   GENERAL:  She is alert, oriented x 3.   VITAL SIGNS:  Reviewed, ECOG PS of 2.   The rest of the system not examined.      LABORATORY DATA:  Reviewed.      ASSESSMENT:   1.  An 84-year-old female with multiple myeloma, which is progressing.   2.  Congestive heart failure under control.   3.  Fatigue.      PLAN:   1.  Labs were all reviewed with the patient.  I explained to her that her myeloma is progressing.  [place] free light chain and IgG has increased.  Discussed regarding resuming Revlimid.  She is agreeable for it.  She will go back on Revlimid 10 mg a day.  She will continue on weekly dexamethasone 20 mg a week.  Side effects reviewed.  She will let us know if she has any side effects.   2.  She will continue on Zometa every 3 months.  She will get one today.  She does not have any jaw or dental related symptoms.   3.  She will continue on prophylactic aspirin and acyclovir.   4.  I will see her in 3 months.  In between, she will see our nurse practitioner.  I advised the patient to go to the emergency room if she has worsening shortness of breath, chest pain, pain/swelling/redness in the extremities or any other concerns.      TOTAL FACE-TO-FACE TIME SPENT:  25 minutes, more than 50% of the time spent in counseling and coordination of care.         YVES PEREZ MD             D: 07/16/2020    T: 2020   MT: ESTELLA      Name:     DUGLAS CASTILLO   MRN:      [id number]        Account:      [id number]   :      1935           Visit Date:   07/15/2020      Document: [id number]          Again, thank you for allowing me to participate in the care of your patient.        Sincerely,        Alex Parry MD

## 2020-07-15 NOTE — PROGRESS NOTES
"Oncology Rooming Note    July 15, 2020 1:05 PM   Quiana Dunaway is a 84 year old female who presents for:    Chief Complaint   Patient presents with     Oncology Clinic Visit     Initial Vitals: /67   Pulse 88   Temp 98.5  F (36.9  C) (Oral)   Resp 16   Ht 1.6 m (5' 3\")   Wt 67.1 kg (148 lb)   SpO2 95%   BMI 26.22 kg/m   Estimated body mass index is 26.22 kg/m  as calculated from the following:    Height as of this encounter: 1.6 m (5' 3\").    Weight as of this encounter: 67.1 kg (148 lb). Body surface area is 1.73 meters squared.  No Pain (0) Comment: Data Unavailable   No LMP recorded. Patient is postmenopausal.  Allergies reviewed: Yes  Medications reviewed: Yes    Medications: Medication refills not needed today.  Pharmacy name entered into [employer]:    [id number] - [place], [place] - [address] AT [facility] [address]  [facility][id number] - [place], [place] - [address] [facility] - [place], [place] - [address]    Clinical concerns: no      Lauren Arredondo CMA            "

## 2020-07-15 NOTE — TELEPHONE ENCOUNTER
Reason for Call:  Other appointment    Detailed comments: Pt had a virtual visit with PCP today. She needs to make an X-ray and F/U appointment video with Dr. Chung. The x-ray needs to be made ASAP and the F/U should be 1 month out. The writer attempted to call the Pt to make the appointment, but did not reach him. A VM was left telling Pt to call back.     Phone Number Patient can be reached at: Cell number on file:    Telephone Information:   Mobile [phone]       Best Time: Any    Can we leave a detailed message on this number? Not Applicable    Call taken on 7/15/2020 at 2:00 PM by Natalya Cobb

## 2020-07-15 NOTE — PROGRESS NOTES
Infusion Nursing Note:  Quiana PEREZ Emelyn presents today for zometa.    Patient seen by provider today: Yes: Sohan   present during visit today: Not Applicable.    Note: N/A.    Intravenous Access:  Peripheral IV placed.    Treatment Conditions:  Lab Results   Component Value Date    HGB 13.2 07/13/2020     Lab Results   Component Value Date    WBC 6.0 07/13/2020      Lab Results   Component Value Date    ANEU 5.0 07/13/2020     Lab Results   Component Value Date     07/13/2020      Lab Results   Component Value Date     07/13/2020                   Lab Results   Component Value Date    POTASSIUM 4.1 07/13/2020           Lab Results   Component Value Date    MAG 2.4 05/26/2020            Lab Results   Component Value Date    CR 0.72 07/13/2020                   Lab Results   Component Value Date    HUMBERTO 9.6 07/13/2020                Lab Results   Component Value Date    BILITOTAL 0.6 07/13/2020           Lab Results   Component Value Date    ALBUMIN 3.6 07/13/2020                    Lab Results   Component Value Date    ALT 23 07/13/2020           Lab Results   Component Value Date    AST 12 07/13/2020       Results reviewed, labs MET treatment parameters, ok to proceed with treatment.      Post Infusion Assessment:  Patient tolerated infusion without incident.  Site patent and intact, free from redness, edema or discomfort.  No evidence of extravasations.       Discharge Plan:   Discharge instructions reviewed with: Patient.  Patient and/or family verbalized understanding of discharge instructions and all questions answered.  Copy of AVS reviewed with patient and/or family.  Patient will return 8/12/20 for next appointment.  Patient discharged in stable condition accompanied by: self.  Departure Mode: Ambulatory.    Robert Lee RN

## 2020-07-15 NOTE — TELEPHONE ENCOUNTER

## 2020-07-15 NOTE — PROGRESS NOTES
"Quiana Dunaway is a 84 year old female who is being evaluated via a billable video visit.      The patient has been notified of following:     \"This video visit will be conducted via a call between you and your physician/provider. We have found that certain health care needs can be provided without the need for an in-person physical exam.  This service lets us provide the care you need with a video conversation.  If a prescription is necessary we can send it directly to your pharmacy.  If lab work is needed we can place an order for that and you can then stop by our lab to have the test done at a later time.    Video visits are billed at different rates depending on your insurance coverage.  Please reach out to your insurance provider with any questions.    If during the course of the call the physician/provider feels a video visit is not appropriate, you will not be charged for this service.\"    Patient has given verbal consent for Video visit? Yes  How would you like to obtain your AVS? [facility]  Patient would like the video invitation sent by: Text to cell phone: [phone]  Will anyone else be joining your video visit? No    Subjective     Quiana Dunaway is a 84 year old female who presents today via video visit for the following health issues:    HPI    1 month follow up - Multiple chronic health cares       Video Start Time: 1119    CC:  Follow up myeloma, hypertension, anxiety, CHF, OAB  She complains of thoracic back pain without radicular symptoms getting progressively worse over several months   She still has some dyspnea  We reviewed her labs  She plans to follow up with cardiology tomorrow  She denies chest pains  She is on a drug holiday from revlamid for fatigue, but it really has not helped her feel a lot better     Patient Active Problem List   Diagnosis     Benign essential hypertension     Multiple myeloma not having achieved remission (H)     Pacemaker     ROBER (generalized anxiety disorder) "     CKD (chronic kidney disease) stage 3, GFR 30-59 ml/min (H)     Chronic diastolic congestive heart failure (H)     Past Surgical History:   Procedure Laterality Date     ------------OTHER-------------      cataract eye surgery     BONE MARROW BIOPSY, BONE SPECIMEN, NEEDLE/TROCAR N/A 7/12/2017    Procedure: BIOPSY BONE MARROW;  BONE MARROW BIOPSY ;  Surgeon: Grace Vela MD;  Location:  GI     IMPLANT PACEMAKER  03/2017    AV block       Social History     Tobacco Use     Smoking status: Never Smoker     Smokeless tobacco: Never Used   Substance Use Topics     Alcohol use: No     Alcohol/week: 0.0 standard drinks     Family History   Problem Relation Age of Onset     Unknown/Adopted Mother      Unknown/Adopted Father          Current Outpatient Medications   Medication Sig Dispense Refill     acyclovir (ZOVIRAX) 400 MG tablet Take 1 tablet (400 mg) by mouth 2 times daily Viral Prophylaxis. 60 tablet 5     aspirin 81 MG chewable tablet Take 81 mg by mouth daily        dexamethasone (DECADRON) 4 MG tablet Take 1 tablet (4 mg) by mouth once a week Once a week with food on Days 1,8,15. 20 tablet 0     furosemide (LASIX) 20 MG tablet Take 1 tablet (20 mg) by mouth daily 90 tablet 3     lisinopril (ZESTRIL) 10 MG tablet TAKE 1 TABLET BY MOUTH DAILY 90 tablet 3     Loperamide HCl (IMODIUM A-D PO) Take 1 tablet by mouth as needed        MYRBETRIQ 25 MG 24 hr tablet TAKE ONE TABLET BY MOUTH ONCE DAILY 90 tablet 1     nitroglycerin (NITROSTAT) 0.4 MG sublingual tablet For chest pain place 1 tablet under the tongue every 5 minutes for 3 doses. If symptoms persist 5 minutes after 1st dose call 911. 25 tablet 0     potassium chloride ER (KLOR-CON M) 20 MEQ CR tablet Take 1 tablet (20 mEq) by mouth daily 90 tablet 3     prochlorperazine (COMPAZINE) 10 MG tablet Take 1 tablet (10 mg) by mouth every 6 hours as needed (Nausea/Vomiting) 30 tablet 6     Zoledronic Acid (ZOMETA IV) Inject 4 mg into the vein every 3  months        Allergies   Allergen Reactions     Levaquin [Levofloxacin] Other (See Comments)     Tingling in feet after 1 dose on 8/7/19         Reviewed and updated as needed this visit by Provider         Review of Systems   Constitutional, HEENT, cardiovascular, pulmonary, gi and gu systems are negative, except as otherwise noted.      Objective    Vitals - Patient Reported  Weight (Patient Reported): 63.5 kg (140 lb)        Physical Exam     GENERAL: Healthy, alert and no distress  EYES: Eyes grossly normal to inspection.  No discharge or erythema, or obvious scleral/conjunctival abnormalities.  RESP: No audible wheeze, cough, or visible cyanosis.  No visible retractions or increased work of breathing.    SKIN: Visible skin clear. No significant rash, abnormal pigmentation or lesions.  NEURO: Cranial nerves grossly intact.  Mentation and speech appropriate for age.  PSYCH: Mentation appears normal, affect normal/bright, judgement and insight intact, normal speech and appearance well-groomed.      Diagnostic Test Results:  Labs reviewed in Epic        Assessment & Plan     1. Acute back pain, unspecified back location, unspecified back pain laterality  Check plain films to exclude lytic lesions from Myeloma     2. Multiple myeloma not having achieved remission (H)  Renal function and counts stable  SPEP may have worsened slightly off drugs  Continue follow up with hematology/oncology as directed    3. Chronic diastolic congestive heart failure (H)  Continue follow up with cardiology regarding diuretic dosing        Return in about 1 month (around 8/15/2020) for recheck.  Patient instructed to return to clinic or contact us sooner if symptoms worsen or new symptoms develop.     César Chung MD  [facility]      Video-Visit Details    Type of service:  Video Visit    Video End Time:11:39 AM    Originating Location (pt. Location): Home    Distant Location (provider location):  [facility]      Platform used for Video Visit: Doximity    Return in about 1 month (around 8/15/2020) for recheck.       César Chung MD

## 2020-07-16 ENCOUNTER — ANCILLARY PROCEDURE (OUTPATIENT)
Dept: CARDIOLOGY | Facility: CLINIC | Age: 85
End: 2020-07-16
Attending: INTERNAL MEDICINE
Payer: MEDICARE

## 2020-07-16 ENCOUNTER — PATIENT OUTREACH (OUTPATIENT)
Dept: NURSING | Facility: CLINIC | Age: 85
End: 2020-07-16
Payer: MEDICARE

## 2020-07-16 VITALS
WEIGHT: 146.07 LBS | SYSTOLIC BLOOD PRESSURE: 112 MMHG | BODY MASS INDEX: 26.88 KG/M2 | DIASTOLIC BLOOD PRESSURE: 71 MMHG | HEIGHT: 62 IN | HEART RATE: 82 BPM

## 2020-07-16 DIAGNOSIS — Z95.0 PACEMAKER: ICD-10-CM

## 2020-07-16 DIAGNOSIS — Z95.0 PACEMAKER: Primary | ICD-10-CM

## 2020-07-16 DIAGNOSIS — I49.5 SINOATRIAL NODE DYSFUNCTION (H): ICD-10-CM

## 2020-07-16 DIAGNOSIS — I48.0 PAROXYSMAL ATRIAL FIBRILLATION (H): ICD-10-CM

## 2020-07-16 DIAGNOSIS — I49.5 SICK SINUS SYNDROME (H): ICD-10-CM

## 2020-07-16 PROCEDURE — 99214 OFFICE O/P EST MOD 30 MIN: CPT | Performed by: INTERNAL MEDICINE

## 2020-07-16 PROCEDURE — 93280 PM DEVICE PROGR EVAL DUAL: CPT | Performed by: INTERNAL MEDICINE

## 2020-07-16 ASSESSMENT — MIFFLIN-ST. JEOR: SCORE: 1070.82

## 2020-07-16 NOTE — PROGRESS NOTES
[employer]onic Dylan (D) Pacemaker Device Check  Patient seen in clinic for device evaluation and iterative programming.   AP: 62 %    : <0.1 %    Mode: DDDR     Underlying Rhythm: SR 60s    Heart Rate: Stable with good variability, patient states she experiences intermittent shortness of breath.    Sensing: WNL    Pacing Threshold: Stable    Impedance: Stable     Battery Status: 6.5 years    Device Site: Well healed     Atrial Arrhythmia: 2 fast A/V episodes logged.   Ventricular Arrhythmia: ***    Setting Change: None   Care Plan: Remote device check in 3 months. Patient following up with Dr. Worthington today in the clinic. Encouraged patient to call device with any questions or concerns.   MARYAM, RN       I have reviewed and interpreted the device interrogation, settings, programming and nurse's summary. The device is functioning within normal device parameters. I agree with the current findings, assessment and plan.

## 2020-07-16 NOTE — PROGRESS NOTES
Clinic Care Coordination Contact  Community Health Worker Follow Up  Spoke with patient    Goals:   Goals Addressed as of 7/16/2020 at 12:22 PM                 Today    5/27/20      Monitoring (pt-stated)   50%  10%    Added 5/27/20 by Shannon Lemons, RN     Goal Statement: I will seek medical help if I have increased shortness of breath ,cough ,fever weight gain or coughing up a colored sputum  Date Goal set: 5/27/2020  Barriers: Deconditioned   Strengths: Motivated   Date to Achieve By: 8/27/2020  Patient expressed understanding of goal: Yes  Action steps to achieve this goal:  1. I will check my weight daily and call if I have a weight gain 2-3# in a day or 5# in a week  2. I will follow a no added salt diet  3. I will  drink plenty of water to prevent dehydration  recommended 6-8 glasses of water a day   4. I will keep future Cardiology and Oncology future visits   5. Care coordinator will make follow up calls in 1-2 weeks  As of today's date 5/27/2020 goal is met at 0 - 25%.   Goal Status:  Active   7/16/20 CHW:    Patient states that she has been experiencing some shortness of breath, she is seeing her cardiologist later today. Patient did not want to discuss further, stating that she will tell her doctor everything later today.             Intervention and Education during outreach: CC role and goal, reminder of appointment later today.     CHW Plan: I will follow up with patient in two weeks to check progression of goal.     Chely Keene, CHW   Clinic Care Coordination  [facility] Clinics:  [place], [place], [place], [place], and [place]  Phone: [phone]  _________________________________________  Next Outreach: 7/30/20

## 2020-07-16 NOTE — LETTER
7/16/2020    César Chung MD  [address]  [facility] [address]    RE: Quiana Dunaway       Dear Colleague,    I had the pleasure of seeing Quiana Dunaway in the [facility].    Electrophysiology/ Clinic Note         H&P and Plan:     REASON FOR VISIT: Electrophysiology evaluation.      HISTORY OF PRESENT ILLNESS: Ms. Dunaway is a delightful 84-year-old lady with a history of hypertension, multiple myeloma, preserved EF heart failure and symptomatic second-degree AV block (pacemaker implantation on 03/27/2017), who is here for routine evaluation.      She was admitted in May this year with shortness of breath.  During hospital stay she underwent an extensive work-up including stress test which rule out ischemia and an echocardiogram which revealed normal cardiac function.  She was treated with diuretics which resulted in improvement of symptoms.      Today, she informs she is doing well.  However, she continues to complain of dyspnea on major exertion.  She appears no that she is euvolemic on physical exam.  She denies any symptoms of chest pain, shortness of breath, lightheadedness, near-syncope or syncope.    Device was interrogated  today and lead parameters seems stable.     Previous studies:  - Echocardiogram (05/25/2020): Normal LV function.  EF was estimated at 60%-65%.  No significant valve disease was noticed.    -Nuclear stress test (05/26/2020):  Negative for ischemia.  -Chest CT (05/24/2020): No PE. No consolidation. Slight intralobular septal thickening suggesting component of interstitial edema. Stable left thyroid and low density left breast nodule.  - LE US (05/24/2020):  No deep venous thrombosis in the left lower extremity.     ASSESSMENT AND PLAN:   1.    Shortness of breath/preserved EF heart failure.  She appears to be euvolemic on physical exam.  I believe some of her shortness of breath is related to deconditioning.  I recommend continue Lasix at  "current dose and increase exercise on daily basis.  2.    Device care.  Continue device checks every 3 months.    3.  Hypertension.  Blood pressure is well controlled.  Continue current therapy with lisinopril and Lasix.         Alberto Worthington MD    Physical Exam:  Vitals: /71   Pulse 82   Ht 1.575 m (5' 2\")   Wt 66.3 kg (146 lb 1.1 oz)   BMI 26.72 kg/m      Constitutional:  AAO x3.  Pt is in NAD.  HEAD: normocephalic.  SKIN: Skin normal color, texture and turgor with no lesions or eruptions.  Eyes: PERRL, EOMI.  ENT:  Supple, normal JVP. No lymphadenopathy or thyroid enlargement.  Chest:  CTAB.  Cardiac: RRR, normal  S1 and S2.  No murmurs rubs or gallop.    Abdomen:  Normal BS.  Soft, non-tender and non-distended.  No rebound or guarding.    Extremities:  Pedious pulses palpable B/L. Trace LE edema noticed.   Neurological: Strength and sensation grossly symmetric and intact throughout.       CURRENT MEDICATIONS:  Current Outpatient Medications   Medication Sig Dispense Refill     acyclovir (ZOVIRAX) 400 MG tablet Take 1 tablet (400 mg) by mouth 2 times daily Viral Prophylaxis. 60 tablet 5     aspirin 81 MG chewable tablet Take 81 mg by mouth daily        dexamethasone (DECADRON) 4 MG tablet Take 1 tablet (4 mg) by mouth once a week Once a week with food on Days 1,8,15. 20 tablet 0     furosemide (LASIX) 20 MG tablet Take 1 tablet (20 mg) by mouth daily 90 tablet 3     lisinopril (ZESTRIL) 10 MG tablet TAKE 1 TABLET BY MOUTH DAILY 90 tablet 3     Loperamide HCl (IMODIUM A-D PO) Take 1 tablet by mouth as needed        LORazepam (ATIVAN) 0.5 MG tablet Take 1 tablet (0.5 mg) by mouth every 6 hours as needed for anxiety 30 tablet 3     MYRBETRIQ 25 MG 24 hr tablet TAKE ONE TABLET BY MOUTH ONCE DAILY 90 tablet 1     potassium chloride ER (KLOR-CON M) 20 MEQ CR tablet Take 1 tablet (20 mEq) by mouth daily 90 tablet 3     prochlorperazine (COMPAZINE) 10 MG tablet Take 1 tablet (10 mg) by mouth every 6 hours as " needed (Nausea/Vomiting) 30 tablet 6     Zoledronic Acid (ZOMETA IV) Inject 4 mg into the vein every 3 months        nitroglycerin (NITROSTAT) 0.4 MG sublingual tablet For chest pain place 1 tablet under the tongue every 5 minutes for 3 doses. If symptoms persist 5 minutes after 1st dose call 911. 25 tablet 0       ALLERGIES     Allergies   Allergen Reactions     Levaquin [Levofloxacin] Other (See Comments)     Tingling in feet after 1 dose on 8/7/19         PAST MEDICAL HISTORY:  Past Medical History:   Diagnosis Date     Anxiety      HTN (hypertension)      Multiple myeloma not having achieved remission (H) 7/18/2017     Pacemaker     Placed due to symptomatic second degree AV Block     Pancytopenia (H) 6/28/2017     Paroxysmal atrial fibrillation (H)        PAST SURGICAL HISTORY:  Past Surgical History:   Procedure Laterality Date     ------------OTHER-------------      cataract eye surgery     BONE MARROW BIOPSY, BONE SPECIMEN, NEEDLE/TROCAR N/A 7/12/2017    Procedure: BIOPSY BONE MARROW;  BONE MARROW BIOPSY ;  Surgeon: Grace Vela MD;  Location:  GI     IMPLANT PACEMAKER  03/2017    AV block       FAMILY HISTORY:  Family History   Problem Relation Age of Onset     Unknown/Adopted Mother      Unknown/Adopted Father        SOCIAL HISTORY:  Social History     Socioeconomic History     Marital status:      Spouse name: None     Number of children: None     Years of education: None     Highest education level: None   Occupational History     None   Social Needs     Financial resource strain: Not hard at all     Food insecurity     Worry: Never true     Inability: Never true     Transportation needs     Medical: No     Non-medical: No   Tobacco Use     Smoking status: Never Smoker     Smokeless tobacco: Never Used   Substance and Sexual Activity     Alcohol use: No     Alcohol/week: 0.0 standard drinks     Drug use: No     Sexual activity: Never   Lifestyle     Physical activity     Days per  week: 0 days     Minutes per session: 0 min     Stress: Not at all   Relationships     Social connections     Talks on phone: Twice a week     Gets together: Twice a week     Attends [employer] service: More than 4 times per year     Active member of club or organization: Yes     Attends meetings of clubs or organizations: Never     Relationship status: None     Intimate partner violence     Fear of current or ex partner: None     Emotionally abused: None     Physically abused: None     Forced sexual activity: None   Other Topics Concern     Parent/sibling w/ CABG, MI or angioplasty before 65F 55M? Not Asked   Social History Narrative     None       Review of Systems:  Skin:  Negative     Eyes:  Negative    ENT:  Negative    Respiratory:  Positive for shortness of breath;dyspnea on exertion  Cardiovascular:  Negative for;palpitations;chest pain Positive for;edema  Gastroenterology: Negative    Genitourinary:  Negative    Musculoskeletal:  Negative    Neurologic:  Positive for numbness or tingling of feet  Psychiatric:  Positive for sleep disturbances  Heme/Lymph/Imm:  Negative    Endocrine:  Negative        Recent Lab Results:  LIPID RESULTS:  Lab Results   Component Value Date    CHOL 176 02/09/2015    HDL 82 02/09/2015    LDL 75 02/09/2015    TRIG 93 02/09/2015    CHOLHDLRATIO 2.8 03/04/2009       LIVER ENZYME RESULTS:  Lab Results   Component Value Date    AST 12 07/13/2020    ALT 23 07/13/2020       CBC RESULTS:  Lab Results   Component Value Date    WBC 6.0 07/13/2020    RBC 4.30 07/13/2020    HGB 13.2 07/13/2020    HCT 38.6 07/13/2020    MCV 90 07/13/2020    MCH 30.7 07/13/2020    MCHC 34.2 07/13/2020    RDW 13.2 07/13/2020     07/13/2020       BMP RESULTS:  Lab Results   Component Value Date     07/13/2020    POTASSIUM 4.1 07/13/2020    CHLORIDE 106 07/13/2020    CO2 27 07/13/2020    ANIONGAP 6 07/13/2020     (H) 07/13/2020    BUN 26 07/13/2020    CR 0.72 07/13/2020    GFRESTIMATED 77  07/13/2020    GFRESTBLACK 89 07/13/2020    HUMBERTO 9.6 07/13/2020        A1C RESULTS:  No results found for: A1C    INR RESULTS:  No results found for: INR      ECHOCARDIOGRAM  No results found for this or any previous visit (from the past 8760 hour(s)).      No orders of the defined types were placed in this encounter.    No orders of the defined types were placed in this encounter.    There are no discontinued medications.      Encounter Diagnosis   Name Primary?     Sick sinus syndrome (H)        Thank you for allowing me to participate in the care of your patient.    Sincerely,     Alberto Worthington MD     [facility]

## 2020-07-16 NOTE — PROGRESS NOTES
Visit Date:   07/15/2020     HEMATOLOGY HISTORY: Mrs. Dunaway is a lady with multiple myeloma (IgG kappa). High risk, t(14;16).  1. On 05/01/2017, WBC of 3.4, hemoglobin of 10.2 and platelet of 154.  2. SPEP on 07/07/2017 revealed M-spike of 1.8.  3. Bone marrow biopsy on 07/12/2017 reveals kappa monotypic plasma cell population consistent with multiple myeloma.  Bone marrow is 70% cellular.  Plasma cell is 50-60%.     -There is gain of chromosome 1, 5, 9, 15, and 17.  There is translocation 14;16.   4.  On 07/18/2017:  -M-spike of 1.9.   -Immunofixation reveals monoclonal IgG kappa.    -IgG level of 2970.  IgA of 15 and IgM of 175.    -Kappa free light chain of 67.7.  Lambda free light chain of 1.42.  Ratio of kappa to lambda of 47.71.    -Beta 2 microglobulin of 3.4.   5. PET scan on 07/24/2017 reveals several focal areas of increased FDG uptake consistent with multiple myeloma.  There is probable epithelial cyst in tail of the pancreas.  No associated abnormal FDG activity.  Left thyroid cysts or nodules without any FDG activity.  There is a 0.3 cm left upper lobe lung nodule.   8.  Velcade, Revlimid and dexamethasone between on 08/14/2017 and 04/30/2018. Velcade stopped.   -Revlimid and dexamethasone continued.     SUBJECTIVE:  Ms. Dunaway is an 84-year-old female with high-risk multiple myeloma.  She was on Revlimid and dexamethasone.  It is on hold since 06/02/2020.  It was put on hold as she had acute exacerbation of heart failure.      The patient is better.  Her cardiac status is better.  She has some fatigue.  No headache.  No dizziness.  No chest pain.  Shortness of breath is much improved.  No abdominal pain, nausea or vomiting.  No urinary or bowel complaints.  No bleeding.  No fever, chills or night sweats.  Appetite has been good.      PHYSICAL EXAMINATION:   GENERAL:  She is alert, oriented x 3.   VITAL SIGNS:  Reviewed, ECOG PS of 2.   The rest of the system not examined.      LABORATORY DATA:   Reviewed.      ASSESSMENT:   1.  An 84-year-old female with multiple myeloma, which is progressing.   2.  Congestive heart failure under control.   3.  Fatigue.      PLAN:   1.  Labs were all reviewed with the patient.  I explained to her that her myeloma is progressing.  Sunsites free light chain and IgG has increased.  Discussed regarding resuming Revlimid.  She is agreeable for it.  She will go back on Revlimid 10 mg a day.  She will continue on weekly dexamethasone 20 mg a week.  Side effects reviewed.  She will let us know if she has any side effects.   2.  She will continue on Zometa every 3 months.  She will get one today.  She does not have any jaw or dental related symptoms.   3.  She will continue on prophylactic aspirin and acyclovir.   4.  I will see her in 3 months.  In between, she will see our nurse practitioner.  I advised the patient to go to the emergency room if she has worsening shortness of breath, chest pain, pain/swelling/redness in the extremities or any other concerns.      TOTAL FACE-TO-FACE TIME SPENT:  25 minutes, more than 50% of the time spent in counseling and coordination of care.         YVES PEREZ MD             D: 2020   T: 2020   MT: ESTELLA      Name:     DUGLAS CASTILLO   MRN:      [id number]        Account:      [id number]   :      1935           Visit Date:   07/15/2020      Document: [id number]

## 2020-07-16 NOTE — PROGRESS NOTES
Electrophysiology/ Clinic Note         H&P and Plan:     REASON FOR VISIT: Electrophysiology evaluation.      HISTORY OF PRESENT ILLNESS: Ms. Dunaway is a delightful 84-year-old lady with a history of hypertension, multiple myeloma, preserved EF heart failure and symptomatic second-degree AV block (pacemaker implantation on 03/27/2017), who is here for routine evaluation.      She was admitted in May this year with shortness of breath.  During hospital stay she underwent an extensive work-up including stress test which rule out ischemia and an echocardiogram which revealed normal cardiac function.  She was treated with diuretics which resulted in improvement of symptoms.      Today, she informs she is doing well.  However, she continues to complain of dyspnea on major exertion.  She appears no that she is euvolemic on physical exam.  She denies any symptoms of chest pain, shortness of breath, lightheadedness, near-syncope or syncope.    Device was interrogated  today and lead parameters seems stable.     Previous studies:  - Echocardiogram (05/25/2020): Normal LV function.  EF was estimated at 60%-65%.  No significant valve disease was noticed.    -Nuclear stress test (05/26/2020):  Negative for ischemia.  -Chest CT (05/24/2020): No PE. No consolidation. Slight intralobular septal thickening suggesting component of interstitial edema. Stable left thyroid and low density left breast nodule.  - LE US (05/24/2020):  No deep venous thrombosis in the left lower extremity.     ASSESSMENT AND PLAN:   1.    Shortness of breath/preserved EF heart failure.  She appears to be euvolemic on physical exam.  I believe some of her shortness of breath is related to deconditioning.  I recommend continue Lasix at current dose and increase exercise on daily basis.  2.    Device care.  Continue device checks every 3 months.    3.  Hypertension.  Blood pressure is well controlled.  Continue current therapy with lisinopril and Lasix.  "Alberto Worthington MD    Physical Exam:  Vitals: /71   Pulse 82   Ht 1.575 m (5' 2\")   Wt 66.3 kg (146 lb 1.1 oz)   BMI 26.72 kg/m      Constitutional:  AAO x3.  Pt is in NAD.  HEAD: normocephalic.  SKIN: Skin normal color, texture and turgor with no lesions or eruptions.  Eyes: PERRL, EOMI.  ENT:  Supple, normal JVP. No lymphadenopathy or thyroid enlargement.  Chest:  CTAB.  Cardiac: RRR, normal  S1 and S2.  No murmurs rubs or gallop.    Abdomen:  Normal BS.  Soft, non-tender and non-distended.  No rebound or guarding.    Extremities:  Pedious pulses palpable B/L. Trace LE edema noticed.   Neurological: Strength and sensation grossly symmetric and intact throughout.       CURRENT MEDICATIONS:  Current Outpatient Medications   Medication Sig Dispense Refill     acyclovir (ZOVIRAX) 400 MG tablet Take 1 tablet (400 mg) by mouth 2 times daily Viral Prophylaxis. 60 tablet 5     aspirin 81 MG chewable tablet Take 81 mg by mouth daily        dexamethasone (DECADRON) 4 MG tablet Take 1 tablet (4 mg) by mouth once a week Once a week with food on Days 1,8,15. 20 tablet 0     furosemide (LASIX) 20 MG tablet Take 1 tablet (20 mg) by mouth daily 90 tablet 3     lisinopril (ZESTRIL) 10 MG tablet TAKE 1 TABLET BY MOUTH DAILY 90 tablet 3     Loperamide HCl (IMODIUM A-D PO) Take 1 tablet by mouth as needed        LORazepam (ATIVAN) 0.5 MG tablet Take 1 tablet (0.5 mg) by mouth every 6 hours as needed for anxiety 30 tablet 3     MYRBETRIQ 25 MG 24 hr tablet TAKE ONE TABLET BY MOUTH ONCE DAILY 90 tablet 1     potassium chloride ER (KLOR-CON M) 20 MEQ CR tablet Take 1 tablet (20 mEq) by mouth daily 90 tablet 3     prochlorperazine (COMPAZINE) 10 MG tablet Take 1 tablet (10 mg) by mouth every 6 hours as needed (Nausea/Vomiting) 30 tablet 6     Zoledronic Acid (ZOMETA IV) Inject 4 mg into the vein every 3 months        nitroglycerin (NITROSTAT) 0.4 MG sublingual tablet For chest pain place 1 tablet under the tongue every 5 " minutes for 3 doses. If symptoms persist 5 minutes after 1st dose call 911. 25 tablet 0       ALLERGIES     Allergies   Allergen Reactions     Levaquin [Levofloxacin] Other (See Comments)     Tingling in feet after 1 dose on 8/7/19         PAST MEDICAL HISTORY:  Past Medical History:   Diagnosis Date     Anxiety      HTN (hypertension)      Multiple myeloma not having achieved remission (H) 7/18/2017     Pacemaker     Placed due to symptomatic second degree AV Block     Pancytopenia (H) 6/28/2017     Paroxysmal atrial fibrillation (H)        PAST SURGICAL HISTORY:  Past Surgical History:   Procedure Laterality Date     ------------OTHER-------------      cataract eye surgery     BONE MARROW BIOPSY, BONE SPECIMEN, NEEDLE/TROCAR N/A 7/12/2017    Procedure: BIOPSY BONE MARROW;  BONE MARROW BIOPSY ;  Surgeon: Grace Vela MD;  Location:  GI     IMPLANT PACEMAKER  03/2017    AV block       FAMILY HISTORY:  Family History   Problem Relation Age of Onset     Unknown/Adopted Mother      Unknown/Adopted Father        SOCIAL HISTORY:  Social History     Socioeconomic History     Marital status:      Spouse name: None     Number of children: None     Years of education: None     Highest education level: None   Occupational History     None   Social Needs     Financial resource strain: Not hard at all     Food insecurity     Worry: Never true     Inability: Never true     Transportation needs     Medical: No     Non-medical: No   Tobacco Use     Smoking status: Never Smoker     Smokeless tobacco: Never Used   Substance and Sexual Activity     Alcohol use: No     Alcohol/week: 0.0 standard drinks     Drug use: No     Sexual activity: Never   Lifestyle     Physical activity     Days per week: 0 days     Minutes per session: 0 min     Stress: Not at all   Relationships     Social connections     Talks on phone: Twice a week     Gets together: Twice a week     Attends Judaism service: More than 4 times per  year     Active member of club or organization: Yes     Attends meetings of clubs or organizations: Never     Relationship status: None     Intimate partner violence     Fear of current or ex partner: None     Emotionally abused: None     Physically abused: None     Forced sexual activity: None   Other Topics Concern     Parent/sibling w/ CABG, MI or angioplasty before 65F 55M? Not Asked   Social History Narrative     None       Review of Systems:  Skin:  Negative     Eyes:  Negative    ENT:  Negative    Respiratory:  Positive for shortness of breath;dyspnea on exertion  Cardiovascular:  Negative for;palpitations;chest pain Positive for;edema  Gastroenterology: Negative    Genitourinary:  Negative    Musculoskeletal:  Negative    Neurologic:  Positive for numbness or tingling of feet  Psychiatric:  Positive for sleep disturbances  Heme/Lymph/Imm:  Negative    Endocrine:  Negative        Recent Lab Results:  LIPID RESULTS:  Lab Results   Component Value Date    CHOL 176 02/09/2015    HDL 82 02/09/2015    LDL 75 02/09/2015    TRIG 93 02/09/2015    CHOLHDLRATIO 2.8 03/04/2009       LIVER ENZYME RESULTS:  Lab Results   Component Value Date    AST 12 07/13/2020    ALT 23 07/13/2020       CBC RESULTS:  Lab Results   Component Value Date    WBC 6.0 07/13/2020    RBC 4.30 07/13/2020    HGB 13.2 07/13/2020    HCT 38.6 07/13/2020    MCV 90 07/13/2020    MCH 30.7 07/13/2020    MCHC 34.2 07/13/2020    RDW 13.2 07/13/2020     07/13/2020       BMP RESULTS:  Lab Results   Component Value Date     07/13/2020    POTASSIUM 4.1 07/13/2020    CHLORIDE 106 07/13/2020    CO2 27 07/13/2020    ANIONGAP 6 07/13/2020     (H) 07/13/2020    BUN 26 07/13/2020    CR 0.72 07/13/2020    GFRESTIMATED 77 07/13/2020    GFRESTBLACK 89 07/13/2020    HUMBERTO 9.6 07/13/2020        A1C RESULTS:  No results found for: A1C    INR RESULTS:  No results found for: INR      ECHOCARDIOGRAM  No results found for this or any previous visit (from  the past 8760 hour(s)).      No orders of the defined types were placed in this encounter.    No orders of the defined types were placed in this encounter.    There are no discontinued medications.      Encounter Diagnosis   Name Primary?     Sick sinus syndrome (H)          CC  Alberto Worthington MD  [address]  [place] [id number]

## 2020-07-20 ENCOUNTER — PATIENT OUTREACH (OUTPATIENT)
Dept: ONCOLOGY | Facility: CLINIC | Age: 85
End: 2020-07-20

## 2020-07-20 NOTE — PROGRESS NOTES
Visit Date:   07/15/2020     HEMATOLOGY HISTORY: Mrs. Dunaway is a lady with multiple myeloma (IgG kappa). High risk, t(14;16).  1. On 05/01/2017, WBC of 3.4, hemoglobin of 10.2 and platelet of 154.  2. SPEP on 07/07/2017 revealed M-spike of 1.8.  3. Bone marrow biopsy on 07/12/2017 reveals kappa monotypic plasma cell population consistent with multiple myeloma.  Bone marrow is 70% cellular.  Plasma cell is 50-60%.     -There is gain of chromosome 1, 5, 9, 15, and 17.  There is translocation 14;16.   4.  On 07/18/2017:  -M-spike of 1.9.   -Immunofixation reveals monoclonal IgG kappa.    -IgG level of 2970.  IgA of 15 and IgM of 175.    -Kappa free light chain of 67.7.  Lambda free light chain of 1.42.  Ratio of kappa to lambda of 47.71.    -Beta 2 microglobulin of 3.4.   5. PET scan on 07/24/2017 reveals several focal areas of increased FDG uptake consistent with multiple myeloma.  There is probable epithelial cyst in tail of the pancreas.  No associated abnormal FDG activity.  Left thyroid cysts or nodules without any FDG activity.  There is a 0.3 cm left upper lobe lung nodule.   8.  Velcade, Revlimid and dexamethasone between on 08/14/2017 and 04/30/2018. Velcade stopped.   -Revlimid and dexamethasone continued.     SUBJECTIVE:  Ms. Dunaway is an 84-year-old female with high-risk multiple myeloma.  She was on Revlimid and dexamethasone.  It is on hold since 06/02/2020.  It was put on hold as she had acute exacerbation of heart failure.      The patient is better.  Her cardiac status is better.  She has some fatigue.  No headache.  No dizziness.  No chest pain.  Shortness of breath is much improved.  No abdominal pain, nausea or vomiting.  No urinary or bowel complaints.  No bleeding.  No fever, chills or night sweats.  Appetite has been good.      PHYSICAL EXAMINATION:   GENERAL:  She is alert, oriented x 3.   VITAL SIGNS:  Reviewed, ECOG PS of 2.   The rest of the system not examined.      LABORATORY DATA:   Reviewed.      ASSESSMENT:   1.  An 84-year-old female with multiple myeloma, which is progressing.   2.  Congestive heart failure under control.   3.  Fatigue.      PLAN:   1.  Labs were all reviewed with the patient.  I explained to her that her myeloma is progressing.  Loyal free light chain and IgG has increased.  Discussed regarding resuming Revlimid.  She is agreeable for it.  She will go back on Revlimid 10 mg a day.  She will continue on weekly dexamethasone 20 mg a week.  Side effects reviewed.  She will let us know if she has any side effects.   2.  She will continue on Zometa every 3 months.  She will get one today.  She does not have any jaw or dental related symptoms.   3.  She will continue on prophylactic aspirin and acyclovir.   4.  I will see her in 3 months.  In between, she will see our nurse practitioner.  I advised the patient to go to the emergency room if she has worsening shortness of breath, chest pain, pain/swelling/redness in the extremities or any other concerns.      TOTAL FACE-TO-FACE TIME SPENT:  25 minutes, more than 50% of the time spent in counseling and coordination of care.         YVES PEREZ MD             D: 2020   T: 2020   MT: ESTELLA      Name:     DUGLAS CASTILLO   MRN:      [id number]        Account:      [id number]   :      1935           Visit Date:   07/15/2020      Document: [id number]

## 2020-07-20 NOTE — PROGRESS NOTES
received a return call Regi and patient has been scheduled to see Juaquin RODRIGUEZ tomorrow at 11am.    Message sent to schedulers.    Alecia Briggs RN

## 2020-07-20 NOTE — PROGRESS NOTES
Dr. Parry aware and advise that patient be evaluated today or tomorrow with colleague Juaquin RODRIGUEZ.     Writer returned call and LVM with Regi requesting a return call to assist in scheduling with Juaquin RODRIGUEZ.    Alecia Briggs RN

## 2020-07-20 NOTE — PROGRESS NOTES
Writer received a call from patient's daughter,  Regi,  regarding a new lump that developed under patient's left breast.     Patient then was placed on the phone and she described further that this lump under the breast is tender, slightly red, solid , and fixed.     Writer to have Dr. Parry advise and return call to Regi.    Alecia Briggs RN

## 2020-07-21 ENCOUNTER — ONCOLOGY VISIT (OUTPATIENT)
Dept: ONCOLOGY | Facility: CLINIC | Age: 85
End: 2020-07-21
Attending: NURSE PRACTITIONER
Payer: MEDICARE

## 2020-07-21 VITALS
SYSTOLIC BLOOD PRESSURE: 128 MMHG | BODY MASS INDEX: 26.87 KG/M2 | HEART RATE: 90 BPM | WEIGHT: 146 LBS | HEIGHT: 62 IN | DIASTOLIC BLOOD PRESSURE: 56 MMHG | OXYGEN SATURATION: 97 % | TEMPERATURE: 98.6 F | RESPIRATION RATE: 16 BRPM

## 2020-07-21 DIAGNOSIS — N63.0 LUMP OR MASS IN BREAST: Primary | ICD-10-CM

## 2020-07-21 DIAGNOSIS — N64.4 BREAST PAIN: ICD-10-CM

## 2020-07-21 DIAGNOSIS — C90.00 MULTIPLE MYELOMA NOT HAVING ACHIEVED REMISSION (H): ICD-10-CM

## 2020-07-21 PROCEDURE — 99214 OFFICE O/P EST MOD 30 MIN: CPT | Performed by: NURSE PRACTITIONER

## 2020-07-21 PROCEDURE — G0463 HOSPITAL OUTPT CLINIC VISIT: HCPCS

## 2020-07-21 ASSESSMENT — MIFFLIN-ST. JEOR: SCORE: 1070.5

## 2020-07-21 ASSESSMENT — PAIN SCALES - GENERAL: PAINLEVEL: NO PAIN (0)

## 2020-07-21 NOTE — PROGRESS NOTES
S.  Patient reports she noted a lump under her left breast on Saturday.  Patient reports some mild tenderness.  Denies redness denies skin rash, denies swelling of the breast nipple discharge.  Denies fever chills sweats    O.  ROS: 14 point ROS neg other than the symptoms noted above in the HPI.    Physical Exam  Neck:      Musculoskeletal: Normal range of motion.   Cardiovascular:      Rate and Rhythm: Normal rate.      Pulses: Normal pulses.   Pulmonary:      Effort: Pulmonary effort is normal.   Abdominal:      General: Abdomen is flat.      Palpations: Abdomen is soft.   Skin:     General: Skin is warm and dry.   Neurological:      Mental Status: She is alert.     Breast : Small danish.  1cm  round lump left breast , no redness no discharge noted  No lumps or bumps noted on the right breast      A/P    This is a 84-year-old female with    lump - left breast   -Order diagnostic left mammogram/ultrsound   Will call patient with results      Addendum   .  Mammogram results discussed with patient  Has had cyst for a long time in the area.    Pt  does not want antibiotics or any intervention now   Pt is advised to call clinic  if pain.  increase in size of the lump ,fever chills sweats or swelling                                                                IMPRESSION: BI-RADS CATEGORY: 2 - Benign.  Benign sebaceous cyst in the area of pain. No evidence of malignancy.  Results were discussed with the patient during her appointment.  Routine clinical follow-up is recommended.     Of note, prior mammograms show a benign sebaceous cyst in the area  where the patient is currently experiencing pain

## 2020-07-21 NOTE — LETTER
7/21/2020         RE: Quiana Dunaway  [address] [facility] [address]        Dear Colleague,    Thank you for referring your patient, Quiana Dunaway, to the [facility]. Please see a copy of my visit note below.    S.  Patient reports she noted a lump under her left breast on Saturday.  Patient reports some mild tenderness.  Denies redness denies skin rash, denies swelling of the breast nipple discharge.  Denies fever chills sweats    O.  ROS: 14 point ROS neg other than the symptoms noted above in the HPI.    Physical Exam  Neck:      Musculoskeletal: Normal range of motion.   Cardiovascular:      Rate and Rhythm: Normal rate.      Pulses: Normal pulses.   Pulmonary:      Effort: Pulmonary effort is normal.   Abdominal:      General: Abdomen is flat.      Palpations: Abdomen is soft.   Skin:     General: Skin is warm and dry.   Neurological:      Mental Status: She is alert.     Breast : Small danish.  1cm  round lump left breast , no redness no discharge noted  No lumps or bumps noted on the right breast      A/P    This is a 84-year-old female with    Left lump of the breast tissue  -Order diagnostic left mammogram/ultrsound   Will call patient with results      Again, thank you for allowing me to participate in the care of your patient.        Sincerely,        MARIUSZ Mckeon CNP

## 2020-07-22 RX ORDER — LENALIDOMIDE 10 MG/1
10 CAPSULE ORAL DAILY
Qty: 14 CAPSULE | Refills: 0 | Status: SHIPPED | OUTPATIENT
Start: 2020-07-22 | End: 2020-10-07

## 2020-07-22 RX ORDER — DEXAMETHASONE 4 MG/1
4 TABLET ORAL WEEKLY
Qty: 15 TABLET | Refills: 0 | Status: SHIPPED | OUTPATIENT
Start: 2020-07-22 | End: 2020-08-26

## 2020-07-22 NOTE — TELEPHONE ENCOUNTER
Dr Chung,  I called patient to schedule Xrays that you ordered on 07/15.  She stated she is feeling better and feels that she does not need them.

## 2020-07-23 ENCOUNTER — DOCUMENTATION ONLY (OUTPATIENT)
Dept: PHARMACY | Facility: CLINIC | Age: 85
End: 2020-07-23

## 2020-07-23 ENCOUNTER — HOSPITAL ENCOUNTER (OUTPATIENT)
Dept: MAMMOGRAPHY | Facility: CLINIC | Age: 85
End: 2020-07-23
Attending: NURSE PRACTITIONER
Payer: MEDICARE

## 2020-07-23 DIAGNOSIS — N63.0 LUMP OR MASS IN BREAST: ICD-10-CM

## 2020-07-23 DIAGNOSIS — N64.4 BREAST PAIN: ICD-10-CM

## 2020-07-23 PROCEDURE — 77066 DX MAMMO INCL CAD BI: CPT

## 2020-07-23 NOTE — PROGRESS NOTES
Clarification on restart of lenalidomide and dexamethasone after a break, per Dr Parry:  Ok to resume lenalidomide dexamethasone. Patient will take remaining 2 days of lenalidomide and then 7 days off (only sending #2 to get her through this current cycle and will need new Rx for next cycle).  She will continue to take 4mg weekly as previously planned (confirmed 4mg with Dr. Parry on 7/23 despite dictation talking about 20mg weekly).  Patient will need a new cycle sent to start on 8/5/20.    Debra Ortega PharmD  July 23, 2020

## 2020-07-30 ENCOUNTER — PATIENT OUTREACH (OUTPATIENT)
Dept: NURSING | Facility: CLINIC | Age: 85
End: 2020-07-30
Payer: MEDICARE

## 2020-07-30 LAB
MDC_IDC_EPISODE_DTM: NORMAL
MDC_IDC_EPISODE_DURATION: 1 S
MDC_IDC_EPISODE_DURATION: 37 S
MDC_IDC_EPISODE_DURATION: 4 S
MDC_IDC_EPISODE_DURATION: 9 S
MDC_IDC_EPISODE_ID: 28
MDC_IDC_EPISODE_ID: 29
MDC_IDC_EPISODE_ID: 30
MDC_IDC_EPISODE_ID: 31
MDC_IDC_EPISODE_TYPE: NORMAL
MDC_IDC_LEAD_IMPLANT_DT: NORMAL
MDC_IDC_LEAD_IMPLANT_DT: NORMAL
MDC_IDC_LEAD_LOCATION: NORMAL
MDC_IDC_LEAD_LOCATION: NORMAL
MDC_IDC_LEAD_LOCATION_DETAIL_1: NORMAL
MDC_IDC_LEAD_LOCATION_DETAIL_1: NORMAL
MDC_IDC_LEAD_MFG: NORMAL
MDC_IDC_LEAD_MFG: NORMAL
MDC_IDC_LEAD_MODEL: NORMAL
MDC_IDC_LEAD_MODEL: NORMAL
MDC_IDC_LEAD_POLARITY_TYPE: NORMAL
MDC_IDC_LEAD_POLARITY_TYPE: NORMAL
MDC_IDC_LEAD_SERIAL: NORMAL
MDC_IDC_LEAD_SERIAL: NORMAL
MDC_IDC_MSMT_BATTERY_DTM: NORMAL
MDC_IDC_MSMT_BATTERY_REMAINING_LONGEVITY: 81 MO
MDC_IDC_MSMT_BATTERY_RRT_TRIGGER: 2.83
MDC_IDC_MSMT_BATTERY_STATUS: NORMAL
MDC_IDC_MSMT_BATTERY_VOLTAGE: 3.01 V
MDC_IDC_MSMT_LEADCHNL_RA_IMPEDANCE_VALUE: 380 OHM
MDC_IDC_MSMT_LEADCHNL_RA_IMPEDANCE_VALUE: 399 OHM
MDC_IDC_MSMT_LEADCHNL_RA_PACING_THRESHOLD_AMPLITUDE: 0.5 V
MDC_IDC_MSMT_LEADCHNL_RA_PACING_THRESHOLD_PULSEWIDTH: 0.4 MS
MDC_IDC_MSMT_LEADCHNL_RA_SENSING_INTR_AMPL: 3.38 MV
MDC_IDC_MSMT_LEADCHNL_RA_SENSING_INTR_AMPL: 4 MV
MDC_IDC_MSMT_LEADCHNL_RV_IMPEDANCE_VALUE: 437 OHM
MDC_IDC_MSMT_LEADCHNL_RV_IMPEDANCE_VALUE: 551 OHM
MDC_IDC_MSMT_LEADCHNL_RV_PACING_THRESHOLD_AMPLITUDE: 1 V
MDC_IDC_MSMT_LEADCHNL_RV_PACING_THRESHOLD_PULSEWIDTH: 0.4 MS
MDC_IDC_MSMT_LEADCHNL_RV_SENSING_INTR_AMPL: 3.75 MV
MDC_IDC_MSMT_LEADCHNL_RV_SENSING_INTR_AMPL: 3.88 MV
MDC_IDC_PG_IMPLANT_DTM: NORMAL
MDC_IDC_PG_MFG: NORMAL
MDC_IDC_PG_MODEL: NORMAL
MDC_IDC_PG_SERIAL: NORMAL
MDC_IDC_PG_TYPE: NORMAL
MDC_IDC_SESS_CLINIC_NAME: NORMAL
MDC_IDC_SESS_DTM: NORMAL
MDC_IDC_SESS_TYPE: NORMAL
MDC_IDC_SET_BRADY_AT_MODE_SWITCH_RATE: 171 {BEATS}/MIN
MDC_IDC_SET_BRADY_HYSTRATE: NORMAL
MDC_IDC_SET_BRADY_LOWRATE: 60 {BEATS}/MIN
MDC_IDC_SET_BRADY_MAX_SENSOR_RATE: 120 {BEATS}/MIN
MDC_IDC_SET_BRADY_MAX_TRACKING_RATE: 120 {BEATS}/MIN
MDC_IDC_SET_BRADY_MODE: NORMAL
MDC_IDC_SET_BRADY_PAV_DELAY_LOW: 300 MS
MDC_IDC_SET_BRADY_SAV_DELAY_LOW: 300 MS
MDC_IDC_SET_LEADCHNL_RA_PACING_AMPLITUDE: 1.5 V
MDC_IDC_SET_LEADCHNL_RA_PACING_ANODE_ELECTRODE_1: NORMAL
MDC_IDC_SET_LEADCHNL_RA_PACING_ANODE_LOCATION_1: NORMAL
MDC_IDC_SET_LEADCHNL_RA_PACING_CAPTURE_MODE: NORMAL
MDC_IDC_SET_LEADCHNL_RA_PACING_CATHODE_ELECTRODE_1: NORMAL
MDC_IDC_SET_LEADCHNL_RA_PACING_CATHODE_LOCATION_1: NORMAL
MDC_IDC_SET_LEADCHNL_RA_PACING_POLARITY: NORMAL
MDC_IDC_SET_LEADCHNL_RA_PACING_PULSEWIDTH: 0.4 MS
MDC_IDC_SET_LEADCHNL_RA_SENSING_ANODE_ELECTRODE_1: NORMAL
MDC_IDC_SET_LEADCHNL_RA_SENSING_ANODE_LOCATION_1: NORMAL
MDC_IDC_SET_LEADCHNL_RA_SENSING_CATHODE_ELECTRODE_1: NORMAL
MDC_IDC_SET_LEADCHNL_RA_SENSING_CATHODE_LOCATION_1: NORMAL
MDC_IDC_SET_LEADCHNL_RA_SENSING_POLARITY: NORMAL
MDC_IDC_SET_LEADCHNL_RA_SENSING_SENSITIVITY: 0.3 MV
MDC_IDC_SET_LEADCHNL_RV_PACING_AMPLITUDE: 2 V
MDC_IDC_SET_LEADCHNL_RV_PACING_ANODE_ELECTRODE_1: NORMAL
MDC_IDC_SET_LEADCHNL_RV_PACING_ANODE_LOCATION_1: NORMAL
MDC_IDC_SET_LEADCHNL_RV_PACING_CAPTURE_MODE: NORMAL
MDC_IDC_SET_LEADCHNL_RV_PACING_CATHODE_ELECTRODE_1: NORMAL
MDC_IDC_SET_LEADCHNL_RV_PACING_CATHODE_LOCATION_1: NORMAL
MDC_IDC_SET_LEADCHNL_RV_PACING_POLARITY: NORMAL
MDC_IDC_SET_LEADCHNL_RV_PACING_PULSEWIDTH: 0.4 MS
MDC_IDC_SET_LEADCHNL_RV_SENSING_ANODE_ELECTRODE_1: NORMAL
MDC_IDC_SET_LEADCHNL_RV_SENSING_ANODE_LOCATION_1: NORMAL
MDC_IDC_SET_LEADCHNL_RV_SENSING_CATHODE_ELECTRODE_1: NORMAL
MDC_IDC_SET_LEADCHNL_RV_SENSING_CATHODE_LOCATION_1: NORMAL
MDC_IDC_SET_LEADCHNL_RV_SENSING_POLARITY: NORMAL
MDC_IDC_SET_LEADCHNL_RV_SENSING_SENSITIVITY: 0.9 MV
MDC_IDC_SET_ZONE_DETECTION_INTERVAL: 350 MS
MDC_IDC_SET_ZONE_DETECTION_INTERVAL: 400 MS
MDC_IDC_SET_ZONE_TYPE: NORMAL
MDC_IDC_STAT_AT_BURDEN_PERCENT: 0 %
MDC_IDC_STAT_AT_DTM_END: NORMAL
MDC_IDC_STAT_AT_DTM_START: NORMAL
MDC_IDC_STAT_BRADY_AP_VP_PERCENT: 0.13 %
MDC_IDC_STAT_BRADY_AP_VS_PERCENT: 61.95 %
MDC_IDC_STAT_BRADY_AS_VP_PERCENT: 0.01 %
MDC_IDC_STAT_BRADY_AS_VS_PERCENT: 37.91 %
MDC_IDC_STAT_BRADY_DTM_END: NORMAL
MDC_IDC_STAT_BRADY_DTM_START: NORMAL
MDC_IDC_STAT_BRADY_RA_PERCENT_PACED: 61.89 %
MDC_IDC_STAT_BRADY_RV_PERCENT_PACED: 0.14 %
MDC_IDC_STAT_EPISODE_RECENT_COUNT: 0
MDC_IDC_STAT_EPISODE_RECENT_COUNT: 2
MDC_IDC_STAT_EPISODE_RECENT_COUNT_DTM_END: NORMAL
MDC_IDC_STAT_EPISODE_RECENT_COUNT_DTM_START: NORMAL
MDC_IDC_STAT_EPISODE_TOTAL_COUNT: 0
MDC_IDC_STAT_EPISODE_TOTAL_COUNT: 0
MDC_IDC_STAT_EPISODE_TOTAL_COUNT: 11
MDC_IDC_STAT_EPISODE_TOTAL_COUNT: 17
MDC_IDC_STAT_EPISODE_TOTAL_COUNT_DTM_END: NORMAL
MDC_IDC_STAT_EPISODE_TOTAL_COUNT_DTM_START: NORMAL
MDC_IDC_STAT_EPISODE_TYPE: NORMAL

## 2020-07-30 NOTE — PROGRESS NOTES
Clinic Care Coordination Contact  Community Health Worker Follow Up  Spoke with patient     Patient elected to disenroll.      Chely Keene, TRACYW   Clinic Care Coordination  [facility] Clinics:  [place], [place], [place], [place], and [place]  Phone: [phone]

## 2020-08-04 ENCOUNTER — TELEPHONE (OUTPATIENT)
Dept: PHARMACY | Facility: CLINIC | Age: 85
End: 2020-08-04

## 2020-08-04 DIAGNOSIS — C90.00 MULTIPLE MYELOMA NOT HAVING ACHIEVED REMISSION (H): Primary | ICD-10-CM

## 2020-08-04 RX ORDER — DEXAMETHASONE 4 MG/1
4 TABLET ORAL WEEKLY
Qty: 15 TABLET | Refills: 0 | Status: SHIPPED | OUTPATIENT
Start: 2020-08-04 | End: 2020-08-26

## 2020-08-04 RX ORDER — LENALIDOMIDE 10 MG/1
10 CAPSULE ORAL DAILY
Qty: 14 CAPSULE | Refills: 0 | Status: SHIPPED | OUTPATIENT
Start: 2020-08-04 | End: 2020-10-07

## 2020-08-04 NOTE — TELEPHONE ENCOUNTER
Oral Chemotherapy Monitoring Program   Medication: Revlimid  Rx: 10mg PO daily on days 1 through 14 of 21 day cycle   Auth #: [id number]    Obtained on: 8/4/2020   Risk Category: adult female  Routine survey questions reviewed.   Rx to be Escribed to [facility]    Terrie Bowen [facility] Pharm[facility] Liaison  [phone]

## 2020-08-12 ENCOUNTER — VIRTUAL VISIT (OUTPATIENT)
Dept: ONCOLOGY | Facility: CLINIC | Age: 85
End: 2020-08-12
Attending: INTERNAL MEDICINE
Payer: MEDICARE

## 2020-08-12 ENCOUNTER — HOSPITAL ENCOUNTER (OUTPATIENT)
Facility: CLINIC | Age: 85
Setting detail: SPECIMEN
Discharge: HOME | End: 2020-08-12
Attending: INTERNAL MEDICINE | Admitting: INTERNAL MEDICINE
Payer: MEDICARE

## 2020-08-12 ENCOUNTER — INFUSION THERAPY VISIT (OUTPATIENT)
Dept: INFUSION THERAPY | Facility: CLINIC | Age: 85
End: 2020-08-12
Attending: INTERNAL MEDICINE
Payer: MEDICARE

## 2020-08-12 ENCOUNTER — DOCUMENTATION ONLY (OUTPATIENT)
Dept: PHARMACY | Facility: CLINIC | Age: 85
End: 2020-08-12

## 2020-08-12 DIAGNOSIS — C90.00 MULTIPLE MYELOMA NOT HAVING ACHIEVED REMISSION (H): ICD-10-CM

## 2020-08-12 DIAGNOSIS — C90.00 MULTIPLE MYELOMA NOT HAVING ACHIEVED REMISSION (H): Primary | ICD-10-CM

## 2020-08-12 LAB
ALBUMIN SERPL-MCNC: 3.6 G/DL (ref 3.4–5)
ALP SERPL-CCNC: 62 U/L (ref 40–150)
ALT SERPL W P-5'-P-CCNC: 26 U/L (ref 0–50)
ANION GAP SERPL CALCULATED.3IONS-SCNC: 4 MMOL/L (ref 3–14)
AST SERPL W P-5'-P-CCNC: 12 U/L (ref 0–45)
BASOPHILS # BLD AUTO: 0 10E9/L (ref 0–0.2)
BASOPHILS NFR BLD AUTO: 0.2 %
BILIRUB SERPL-MCNC: 1 MG/DL (ref 0.2–1.3)
BUN SERPL-MCNC: 21 MG/DL (ref 7–30)
CALCIUM SERPL-MCNC: 8.9 MG/DL (ref 8.5–10.1)
CHLORIDE SERPL-SCNC: 110 MMOL/L (ref 94–109)
CO2 SERPL-SCNC: 27 MMOL/L (ref 20–32)
CREAT SERPL-MCNC: 0.73 MG/DL (ref 0.52–1.04)
DIFFERENTIAL METHOD BLD: NORMAL
EOSINOPHIL # BLD AUTO: 0.1 10E9/L (ref 0–0.7)
EOSINOPHIL NFR BLD AUTO: 1.5 %
ERYTHROCYTE [DISTWIDTH] IN BLOOD BY AUTOMATED COUNT: 14.1 % (ref 10–15)
GFR SERPL CREATININE-BSD FRML MDRD: 75 ML/MIN/{1.73_M2}
GLUCOSE SERPL-MCNC: 112 MG/DL (ref 70–99)
HCT VFR BLD AUTO: 35.1 % (ref 35–47)
HGB BLD-MCNC: 11.8 G/DL (ref 11.7–15.7)
IMM GRANULOCYTES # BLD: 0 10E9/L (ref 0–0.4)
IMM GRANULOCYTES NFR BLD: 0.4 %
LYMPHOCYTES # BLD AUTO: 1.3 10E9/L (ref 0.8–5.3)
LYMPHOCYTES NFR BLD AUTO: 23.9 %
MCH RBC QN AUTO: 31.1 PG (ref 26.5–33)
MCHC RBC AUTO-ENTMCNC: 33.6 G/DL (ref 31.5–36.5)
MCV RBC AUTO: 92 FL (ref 78–100)
MONOCYTES # BLD AUTO: 0.4 10E9/L (ref 0–1.3)
MONOCYTES NFR BLD AUTO: 8.4 %
NEUTROPHILS # BLD AUTO: 3.4 10E9/L (ref 1.6–8.3)
NEUTROPHILS NFR BLD AUTO: 65.6 %
NRBC # BLD AUTO: 0 10*3/UL
NRBC BLD AUTO-RTO: 0 /100
PLATELET # BLD AUTO: 201 10E9/L (ref 150–450)
POTASSIUM SERPL-SCNC: 3.9 MMOL/L (ref 3.4–5.3)
PROT SERPL-MCNC: 7 G/DL (ref 6.8–8.8)
RBC # BLD AUTO: 3.8 10E12/L (ref 3.8–5.2)
SODIUM SERPL-SCNC: 141 MMOL/L (ref 133–144)
WBC # BLD AUTO: 5.2 10E9/L (ref 4–11)

## 2020-08-12 PROCEDURE — 80053 COMPREHEN METABOLIC PANEL: CPT | Performed by: INTERNAL MEDICINE

## 2020-08-12 PROCEDURE — 99442 ZZC PHYSICIAN TELEPHONE EVALUATION 11-20 MIN: CPT | Performed by: NURSE PRACTITIONER

## 2020-08-12 PROCEDURE — 85025 COMPLETE CBC W/AUTO DIFF WBC: CPT | Performed by: INTERNAL MEDICINE

## 2020-08-12 PROCEDURE — G0463 HOSPITAL OUTPT CLINIC VISIT: HCPCS

## 2020-08-12 PROCEDURE — 36415 COLL VENOUS BLD VENIPUNCTURE: CPT

## 2020-08-12 ASSESSMENT — PAIN SCALES - GENERAL: PAINLEVEL: NO PAIN (0)

## 2020-08-12 NOTE — PROGRESS NOTES
"Duglas Castillo is a 84 year old female who is being evaluated via a billable telephone visit.      The patient has been notified of following:     \"This telephone visit will be conducted via a call between you and your physician/provider. We have found that certain health care needs can be provided without the need for a physical exam.  This service lets us provide the care you need with a short phone conversation.  If a prescription is necessary we can send it directly to your pharmacy.  If lab work is needed we can place an order for that and you can then stop by our lab to have the test done at a later time.    If during the course of the call the physician/provider feels a telephone visit is not appropriate, you will not be charged for this service.\"     Patient has given verbal consent for Telephone visit?  Yes     Subjective  Patient reports she continues to feel well.  She denies fever chills sweats denies cough no shortness of breath denies chest pain denies nausea vomiting diarrhea denies abdominal pain denies bleeding     ROS: 14 point ROS neg other than the symptoms noted above in the HPI.        ALLERGIES  Levaquin [levofloxacin]       Results for DUGLAS CASTILLO (MRN [id number]) as of 8/12/2020 13:04   Ref. Range 8/12/2020 10:57   Sodium Latest Ref Range: 133 - 144 mmol/L 141   Potassium Latest Ref Range: 3.4 - 5.3 mmol/L 3.9   Chloride Latest Ref Range: 94 - 109 mmol/L 110 (H)   Carbon Dioxide Latest Ref Range: 20 - 32 mmol/L 27   Urea Nitrogen Latest Ref Range: 7 - 30 mg/dL 21   Creatinine Latest Ref Range: 0.52 - 1.04 mg/dL 0.73   GFR Estimate Latest Ref Range: >60 mL/min/1.73_m2 75   GFR Estimate If Black Latest Ref Range: >60 mL/min/1.73_m2 87   Calcium Latest Ref Range: 8.5 - 10.1 mg/dL 8.9   Anion Gap Latest Ref Range: 3 - 14 mmol/L 4   Albumin Latest Ref Range: 3.4 - 5.0 g/dL 3.6   Protein Total Latest Ref Range: 6.8 - 8.8 g/dL 7.0   Bilirubin Total Latest Ref Range: 0.2 - 1.3 mg/dL 1.0 "   Alkaline Phosphatase Latest Ref Range: 40 - 150 U/L 62   ALT Latest Ref Range: 0 - 50 U/L 26   AST Latest Ref Range: 0 - 45 U/L 12   Glucose Latest Ref Range: 70 - 99 mg/dL 112 (H)   WBC Latest Ref Range: 4.0 - 11.0 10e9/L 5.2   Hemoglobin Latest Ref Range: 11.7 - 15.7 g/dL 11.8   Hematocrit Latest Ref Range: 35.0 - 47.0 % 35.1   Platelet Count Latest Ref Range: 150 - 450 10e9/L 201   RBC Count Latest Ref Range: 3.8 - 5.2 10e12/L 3.80   MCV Latest Ref Range: 78 - 100 fl 92   MCH Latest Ref Range: 26.5 - 33.0 pg 31.1   MCHC Latest Ref Range: 31.5 - 36.5 g/dL 33.6   RDW Latest Ref Range: 10.0 - 15.0 % 14.1   Diff Method Unknown Automated Method   % Neutrophils Latest Units: % 65.6   % Lymphocytes Latest Units: % 23.9   % Monocytes Latest Units: % 8.4   % Eosinophils Latest Units: % 1.5   % Basophils Latest Units: % 0.2   % Immature Granulocytes Latest Units: % 0.4   Nucleated RBCs Latest Ref Range: 0 /100 0   Absolute Neutrophil Latest Ref Range: 1.6 - 8.3 10e9/L 3.4   Absolute Lymphocytes Latest Ref Range: 0.8 - 5.3 10e9/L 1.3   Absolute Monocytes Latest Ref Range: 0.0 - 1.3 10e9/L 0.4   Absolute Eosinophils Latest Ref Range: 0.0 - 0.7 10e9/L 0.1   Absolute Basophils Latest Ref Range: 0.0 - 0.2 10e9/L 0.0   Abs Immature Granulocytes Latest Ref Range: 0 - 0.4 10e9/L 0.0   Absolute Nucleated RBC Unknown 0.0     Assessment and plan    This is a 84-year-old female with    Multiple myeloma  -Patient is clinically doing well.  Patient reports that she has been tolerating treatment well  Continue with Revlimid   Continue dexamethasone 4 mg every week    Prophylaxis  Continue with aspirin and acyclovir    Bone health  Labs reviewed okay to give Zometa.  Patient continue with Zometa every 3 months        Phone call duration: 20 minutes

## 2020-08-12 NOTE — PROGRESS NOTES
"The patient has been notified of following:     \"This telephone visit will be conducted via a call between you and your physician/provider. We have found that certain health care needs can be provided without the need for a physical exam.  This service lets us provide the care you need with a short phone conversation.  If a prescription is necessary we can send it directly to your pharmacy.  If lab work is needed we can place an order for that and you can then stop by our lab to have the test done at a later time.    Telephone visits are billed at different rates depending on your insurance coverage. During this emergency period, for some insurers they may be billed the same as an in-person visit.  Please reach out to your insurance provider with any questions.    If during the course of the call the physician/provider feels a telephone visit is not appropriate, you will not be charged for this service.\"    Patient has given verbal consent for Telephone visit?  Yes    What phone number would you like to be contacted at? [phone]     How would you like to obtain your AVS? Mail a copy        Shari J. Schoenberger, CMA    "

## 2020-08-12 NOTE — PROGRESS NOTES
Oral Chemotherapy Monitoring Program  Lab Follow Up    Patient currently on lanalidomide/dex therapy for MM.    Reviewed lab results from 8/12/20.    No concerning abnormalities.    Assessment & Plan:  Continue current therapy.     Questions answered to patient's satisfaction.    Follow-Up:  One month with labs    Chad AlvaD  August 12, 2020

## 2020-08-12 NOTE — LETTER
"    8/12/2020         RE: Quiana Dunaway  [address] [facility] [address]        Dear Colleague,    Thank you for referring your patient, Quiana Dunaway, to the [facility]CER [facility]. Please see a copy of my visit note below.    The patient has been notified of following:     \"This telephone visit will be conducted via a call between you and your physician/provider. We have found that certain health care needs can be provided without the need for a physical exam.  This service lets us provide the care you need with a short phone conversation.  If a prescription is necessary we can send it directly to your pharmacy.  If lab work is needed we can place an order for that and you can then stop by our lab to have the test done at a later time.    Telephone visits are billed at different rates depending on your insurance coverage. During this emergency period, for some insurers they may be billed the same as an in-person visit.  Please reach out to your insurance provider with any questions.    If during the course of the call the physician/provider feels a telephone visit is not appropriate, you will not be charged for this service.\"    Patient has given verbal consent for Telephone visit?  Yes    What phone number would you like to be contacted at? [phone]     How would you like to obtain your AVS? Mail a copy        Shari J. Schoenberger, CMA      Quiana Dunaway is a 84 year old female who is being evaluated via a billable telephone visit.      The patient has been notified of following:     \"This telephone visit will be conducted via a call between you and your physician/provider. We have found that certain health care needs can be provided without the need for a physical exam.  This service lets us provide the care you need with a short phone conversation.  If a prescription is necessary we can send it directly to your pharmacy.  If lab work is needed we can place an order for that and you can " "then stop by our lab to have the test done at a later time.    If during the course of the call the physician/provider feels a telephone visit is not appropriate, you will not be charged for this service.\"     Patient has given verbal consent for Telephone visit?  Yes     Subjective  Patient reports she continues to feel well.  She denies fever chills sweats denies cough no shortness of breath denies chest pain denies nausea vomiting diarrhea denies abdominal pain denies bleeding     ROS: 14 point ROS neg other than the symptoms noted above in the HPI.        ALLERGIES  Levaquin [levofloxacin]       Results for DUGLAS CASTILLO (MRN [id number]) as of 8/12/2020 13:04   Ref. Range 8/12/2020 10:57   Sodium Latest Ref Range: 133 - 144 mmol/L 141   Potassium Latest Ref Range: 3.4 - 5.3 mmol/L 3.9   Chloride Latest Ref Range: 94 - 109 mmol/L 110 (H)   Carbon Dioxide Latest Ref Range: 20 - 32 mmol/L 27   Urea Nitrogen Latest Ref Range: 7 - 30 mg/dL 21   Creatinine Latest Ref Range: 0.52 - 1.04 mg/dL 0.73   GFR Estimate Latest Ref Range: >60 mL/min/1.73_m2 75   GFR Estimate If Black Latest Ref Range: >60 mL/min/1.73_m2 87   Calcium Latest Ref Range: 8.5 - 10.1 mg/dL 8.9   Anion Gap Latest Ref Range: 3 - 14 mmol/L 4   Albumin Latest Ref Range: 3.4 - 5.0 g/dL 3.6   Protein Total Latest Ref Range: 6.8 - 8.8 g/dL 7.0   Bilirubin Total Latest Ref Range: 0.2 - 1.3 mg/dL 1.0   Alkaline Phosphatase Latest Ref Range: 40 - 150 U/L 62   ALT Latest Ref Range: 0 - 50 U/L 26   AST Latest Ref Range: 0 - 45 U/L 12   Glucose Latest Ref Range: 70 - 99 mg/dL 112 (H)   WBC Latest Ref Range: 4.0 - 11.0 10e9/L 5.2   Hemoglobin Latest Ref Range: 11.7 - 15.7 g/dL 11.8   Hematocrit Latest Ref Range: 35.0 - 47.0 % 35.1   Platelet Count Latest Ref Range: 150 - 450 10e9/L 201   RBC Count Latest Ref Range: 3.8 - 5.2 10e12/L 3.80   MCV Latest Ref Range: 78 - 100 fl 92   MCH Latest Ref Range: 26.5 - 33.0 pg 31.1   MCHC Latest Ref Range: 31.5 - 36.5 g/dL " 33.6   RDW Latest Ref Range: 10.0 - 15.0 % 14.1   Diff Method Unknown Automated Method   % Neutrophils Latest Units: % 65.6   % Lymphocytes Latest Units: % 23.9   % Monocytes Latest Units: % 8.4   % Eosinophils Latest Units: % 1.5   % Basophils Latest Units: % 0.2   % Immature Granulocytes Latest Units: % 0.4   Nucleated RBCs Latest Ref Range: 0 /100 0   Absolute Neutrophil Latest Ref Range: 1.6 - 8.3 10e9/L 3.4   Absolute Lymphocytes Latest Ref Range: 0.8 - 5.3 10e9/L 1.3   Absolute Monocytes Latest Ref Range: 0.0 - 1.3 10e9/L 0.4   Absolute Eosinophils Latest Ref Range: 0.0 - 0.7 10e9/L 0.1   Absolute Basophils Latest Ref Range: 0.0 - 0.2 10e9/L 0.0   Abs Immature Granulocytes Latest Ref Range: 0 - 0.4 10e9/L 0.0   Absolute Nucleated RBC Unknown 0.0     Assessment and plan    This is a 84-year-old female with    Multiple myeloma  -Patient is clinically doing well.  Patient reports that she has been tolerating treatment well  Continue with Revlimid   Continue dexamethasone 4 mg every week    Prophylaxis  Continue with aspirin and acyclovir    Bone health  Labs reviewed okay to give Zometa.  Patient continue with Zometa every 3 months        Phone call duration: 20 minutes        Again, thank you for allowing me to participate in the care of your patient.        Sincerely,        MARIUSZ Mckeon CNP

## 2020-08-12 NOTE — RESULT ENCOUNTER NOTE
Dear Ms. Dunaway,    Blood tests are good.    Please, call me with any questions.    Alex Parry MD

## 2020-08-12 NOTE — PROGRESS NOTES
Medical Assistant Note:  Quiana Dunaway presents today for blood draw.    Patient seen by provider today: No.   present during visit today: Not Applicable.    Concerns: No Concerns.    Procedure:  Lab draw site: lac, Needle type: bf, Gauge: 23.    Post Assessment:  Labs drawn without difficulty: No.    Discharge Plan:  Departure Mode: Ambulatory.    Face to Face Time: 5 min  .    Mariela Lyn, CMA

## 2020-08-12 NOTE — LETTER
"    8/12/2020         RE: Quiana Dunaway  [address] [facility] [address]        Dear Colleague,    Thank you for referring your patient, Quiana Dunaway, to the [facility]ER [facility]. Please see a copy of my visit note below.    The patient has been notified of following:     \"This telephone visit will be conducted via a call between you and your physician/provider. We have found that certain health care needs can be provided without the need for a physical exam.  This service lets us provide the care you need with a short phone conversation.  If a prescription is necessary we can send it directly to your pharmacy.  If lab work is needed we can place an order for that and you can then stop by our lab to have the test done at a later time.    Telephone visits are billed at different rates depending on your insurance coverage. During this emergency period, for some insurers they may be billed the same as an in-person visit.  Please reach out to your insurance provider with any questions.    If during the course of the call the physician/provider feels a telephone visit is not appropriate, you will not be charged for this service.\"    Patient has given verbal consent for Telephone visit?  Yes    What phone number would you like to be contacted at? [phone]     How would you like to obtain your AVS? Mail a copy        Shari J. Schoenberger, CMA      Quiana Dunaway is a 84 year old female who is being evaluated via a billable telephone visit.      The patient has been notified of following:     \"This telephone visit will be conducted via a call between you and your physician/provider. We have found that certain health care needs can be provided without the need for a physical exam.  This service lets us provide the care you need with a short phone conversation.  If a prescription is necessary we can send it directly to your pharmacy.  If lab work is needed we can place an order for that and you can " "then stop by our lab to have the test done at a later time.    If during the course of the call the physician/provider feels a telephone visit is not appropriate, you will not be charged for this service.\"     Patient has given verbal consent for Telephone visit?  Yes     Subjective  Patient reports she continues to feel well.  She denies fever chills sweats denies cough no shortness of breath denies chest pain denies nausea vomiting diarrhea denies abdominal pain denies bleeding     ROS: 14 point ROS neg other than the symptoms noted above in the HPI.        ALLERGIES  Levaquin [levofloxacin]       Results for DUGLAS CASTILLO (MRN [id number]) as of 8/12/2020 13:04   Ref. Range 8/12/2020 10:57   Sodium Latest Ref Range: 133 - 144 mmol/L 141   Potassium Latest Ref Range: 3.4 - 5.3 mmol/L 3.9   Chloride Latest Ref Range: 94 - 109 mmol/L 110 (H)   Carbon Dioxide Latest Ref Range: 20 - 32 mmol/L 27   Urea Nitrogen Latest Ref Range: 7 - 30 mg/dL 21   Creatinine Latest Ref Range: 0.52 - 1.04 mg/dL 0.73   GFR Estimate Latest Ref Range: >60 mL/min/1.73_m2 75   GFR Estimate If Black Latest Ref Range: >60 mL/min/1.73_m2 87   Calcium Latest Ref Range: 8.5 - 10.1 mg/dL 8.9   Anion Gap Latest Ref Range: 3 - 14 mmol/L 4   Albumin Latest Ref Range: 3.4 - 5.0 g/dL 3.6   Protein Total Latest Ref Range: 6.8 - 8.8 g/dL 7.0   Bilirubin Total Latest Ref Range: 0.2 - 1.3 mg/dL 1.0   Alkaline Phosphatase Latest Ref Range: 40 - 150 U/L 62   ALT Latest Ref Range: 0 - 50 U/L 26   AST Latest Ref Range: 0 - 45 U/L 12   Glucose Latest Ref Range: 70 - 99 mg/dL 112 (H)   WBC Latest Ref Range: 4.0 - 11.0 10e9/L 5.2   Hemoglobin Latest Ref Range: 11.7 - 15.7 g/dL 11.8   Hematocrit Latest Ref Range: 35.0 - 47.0 % 35.1   Platelet Count Latest Ref Range: 150 - 450 10e9/L 201   RBC Count Latest Ref Range: 3.8 - 5.2 10e12/L 3.80   MCV Latest Ref Range: 78 - 100 fl 92   MCH Latest Ref Range: 26.5 - 33.0 pg 31.1   MCHC Latest Ref Range: 31.5 - 36.5 g/dL " 33.6   RDW Latest Ref Range: 10.0 - 15.0 % 14.1   Diff Method Unknown Automated Method   % Neutrophils Latest Units: % 65.6   % Lymphocytes Latest Units: % 23.9   % Monocytes Latest Units: % 8.4   % Eosinophils Latest Units: % 1.5   % Basophils Latest Units: % 0.2   % Immature Granulocytes Latest Units: % 0.4   Nucleated RBCs Latest Ref Range: 0 /100 0   Absolute Neutrophil Latest Ref Range: 1.6 - 8.3 10e9/L 3.4   Absolute Lymphocytes Latest Ref Range: 0.8 - 5.3 10e9/L 1.3   Absolute Monocytes Latest Ref Range: 0.0 - 1.3 10e9/L 0.4   Absolute Eosinophils Latest Ref Range: 0.0 - 0.7 10e9/L 0.1   Absolute Basophils Latest Ref Range: 0.0 - 0.2 10e9/L 0.0   Abs Immature Granulocytes Latest Ref Range: 0 - 0.4 10e9/L 0.0   Absolute Nucleated RBC Unknown 0.0     Assessment and plan    This is a 84-year-old female with    Multiple myeloma  -Patient is clinically doing well.  Patient reports that she has been tolerating treatment well  Continue with Revlimid   Continue dexamethasone 4 mg every week    Prophylaxis  Continue with aspirin and acyclovir    Bone health  Labs reviewed okay to give Zometa.  Patient continue with Zometa every 3 months        Phone call duration: 20 minutes        Again, thank you for allowing me to participate in the care of your patient.        Sincerely,        MARIUSZ Mckeon CNP

## 2020-08-14 DIAGNOSIS — C90.00 MULTIPLE MYELOMA NOT HAVING ACHIEVED REMISSION (H): Primary | ICD-10-CM

## 2020-08-14 RX ORDER — DEXAMETHASONE 4 MG/1
4 TABLET ORAL WEEKLY
Qty: 15 TABLET | Refills: 0 | Status: SHIPPED | OUTPATIENT
Start: 2020-08-14 | End: 2020-10-07

## 2020-08-14 RX ORDER — LENALIDOMIDE 10 MG/1
10 CAPSULE ORAL DAILY
Qty: 14 CAPSULE | Refills: 0 | Status: SHIPPED | OUTPATIENT
Start: 2020-08-14 | End: 2020-10-07

## 2020-08-19 ENCOUNTER — TELEPHONE (OUTPATIENT)
Dept: PHARMACY | Facility: CLINIC | Age: 85
End: 2020-08-19

## 2020-08-19 NOTE — TELEPHONE ENCOUNTER
Oral Chemotherapy Monitoring Program   Medication: Revlimid  Rx: 10mg PO daily on days 1 through 14 of 21 day cycle   Auth #: [id number]   Obtained on: 8/19/2020   Risk Category: adult female of non child bearing potential  Routine survey questions reviewed.   Rx [facility] be Escribed to [facility]    Terrie Bowen [facility] Oncology Pharmacy Liaison  [phone]

## 2020-08-22 DIAGNOSIS — C90.00 MULTIPLE MYELOMA NOT HAVING ACHIEVED REMISSION (H): Primary | ICD-10-CM

## 2020-08-26 ENCOUNTER — OFFICE VISIT (OUTPATIENT)
Dept: FAMILY MEDICINE | Facility: CLINIC | Age: 85
End: 2020-08-26
Payer: MEDICARE

## 2020-08-26 VITALS
OXYGEN SATURATION: 93 % | TEMPERATURE: 97.5 F | SYSTOLIC BLOOD PRESSURE: 88 MMHG | BODY MASS INDEX: 27.32 KG/M2 | HEART RATE: 69 BPM | DIASTOLIC BLOOD PRESSURE: 55 MMHG | WEIGHT: 148.44 LBS | HEIGHT: 62 IN

## 2020-08-26 DIAGNOSIS — I50.32 CHRONIC DIASTOLIC CONGESTIVE HEART FAILURE (H): ICD-10-CM

## 2020-08-26 DIAGNOSIS — C90.00 MULTIPLE MYELOMA NOT HAVING ACHIEVED REMISSION (H): ICD-10-CM

## 2020-08-26 DIAGNOSIS — I10 BENIGN ESSENTIAL HYPERTENSION: ICD-10-CM

## 2020-08-26 DIAGNOSIS — Z00.00 ROUTINE GENERAL MEDICAL EXAMINATION AT A HEALTH CARE FACILITY: Primary | ICD-10-CM

## 2020-08-26 PROCEDURE — 99213 OFFICE O/P EST LOW 20 MIN: CPT | Mod: 25 | Performed by: INTERNAL MEDICINE

## 2020-08-26 PROCEDURE — G0439 PPPS, SUBSEQ VISIT: HCPCS | Performed by: INTERNAL MEDICINE

## 2020-08-26 RX ORDER — POTASSIUM CHLORIDE 750 MG/1
10 TABLET, EXTENDED RELEASE ORAL 2 TIMES DAILY
Qty: 180 TABLET | Refills: 3 | Status: SHIPPED | OUTPATIENT
Start: 2020-08-26 | End: 2022-06-15

## 2020-08-26 RX ORDER — LISINOPRIL 2.5 MG/1
2.5 TABLET ORAL DAILY
Qty: 90 TABLET | Refills: 3 | Status: SHIPPED | OUTPATIENT
Start: 2020-08-26 | End: 2021-06-01

## 2020-08-26 RX ORDER — PROCHLORPERAZINE MALEATE 10 MG
10 TABLET ORAL EVERY 6 HOURS PRN
Qty: 30 TABLET | Refills: 6 | Status: SHIPPED | OUTPATIENT
Start: 2020-08-26 | End: 2021-05-24

## 2020-08-26 ASSESSMENT — ACTIVITIES OF DAILY LIVING (ADL): CURRENT_FUNCTION: NO ASSISTANCE NEEDED

## 2020-08-26 ASSESSMENT — MIFFLIN-ST. JEOR: SCORE: 1076.56

## 2020-08-26 NOTE — PROGRESS NOTES
"SUBJECTIVE:   Quiana Dunaway is a 85 year old female who presents for Preventive Visit.    She has had low blood pressure readings associated with mild fatigue for several weeks  Also it is too hard to take K20 pills    Are you in the first 12 months of your Medicare coverage?  No    Healthy Habits:    In general, how would you rate your overall health?  Good    Frequency of exercise:  6-7 days/week    Duration of exercise:  Less than 15 minutes    Do you usually eat at least 4 servings of fruit and vegetables a day, include whole grains    & fiber and avoid regularly eating high fat or \"junk\" foods?  No (1-2 servings)    Taking medications regularly:  No    Barriers to taking medications:  None    Medication side effects:  None    Ability to successfully perform activities of daily living:  No assistance needed    Home Safety:  No safety concerns identified    Hearing Impairment:  No hearing concerns    In the past 6 months, have you been bothered by leaking of urine? Yes (Occasionally)    In general, how would you rate your overall mental or emotional health?  Fair      PHQ-2 Total Score:    Additional concerns today:  Yes (Concerned about her speech)    Do you feel safe in your environment? Yes    Have you ever done Advance Care Planning? (For example, a Health Directive, POLST, or a discussion with a medical provider or your loved ones about your wishes): Yes, patient states has an Advance Care Planning document and will bring a copy to the clinic.      Fall risk  Fallen 2 or more times in the past year?: No  Any fall with injury in the past year?: No    Cognitive Screening   1) Repeat 3 items (Leader, Season, Table)    2) Clock draw: ABNORMAL   3) 3 item recall: Recalls 1 object   Results: ABNORMAL clock, 1-2 items recalled: PROBABLE COGNITIVE IMPAIRMENT, **INFORM PROVIDER**    Mini-CogTM Copyright GINO Bosch. Licensed by the author for use in [facility]; reprinted with permission (arely@.[facility]). " All rights reserved.      Do you have sleep apnea, excessive snoring or daytime drowsiness?: no    Reviewed and updated as needed this visit by clinical staff  Tobacco  Allergies  Meds  Med Hx  Surg Hx  Fam Hx  Soc Hx        Reviewed and updated as needed this visit by Provider        Social History     Tobacco Use     Smoking status: Never Smoker     Smokeless tobacco: Never Used   Substance Use Topics     Alcohol use: No     Alcohol/week: 0.0 standard drinks     If you drink alcohol do you typically have >3 drinks per day or >7 drinks per week? No    Alcohol Use 8/26/2020   Prescreen: >3 drinks/day or >7 drinks/week? No               Current providers sharing in care for this patient include:   Patient Care Team:  César Chung MD as PCP - General (Internal Medicine)  César Chung MD as MD (Internal Medicine)  César Chung MD as Assigned PCP    The following health maintenance items are reviewed in Epic and correct as of today:  Health Maintenance   Topic Date Due     DEXA  1935     HF ACTION PLAN  1935     ADVANCE CARE PLANNING  1935     ZOSTER IMMUNIZATION (1 of 2) 07/23/1986     MEDICARE ANNUAL WELLNESS VISIT  07/23/2001     LIPID  02/09/2016     INFLUENZA VACCINE (1) 09/01/2020     DTAP/TDAP/TD IMMUNIZATION (2 - Td) 11/01/2020     BMP  02/12/2021     FALL RISK ASSESSMENT  07/15/2021     ALT  08/12/2021     CBC  08/12/2021     TSH W/FREE T4 REFLEX  Completed     PHQ-2  Completed     PNEUMOCOCCAL IMMUNIZATION 65+ HIGH/HIGHEST RISK  Completed     IPV IMMUNIZATION  Aged Out     MENINGITIS IMMUNIZATION  Aged Out     HEPATITIS B IMMUNIZATION  Aged Out     Patient Active Problem List   Diagnosis     Benign essential hypertension     Multiple myeloma not having achieved remission (H)     Pacemaker     ROBER (generalized anxiety disorder)     CKD (chronic kidney disease) stage 3, GFR 30-59 ml/min (H)     Chronic diastolic congestive heart failure (H)     Past Surgical History:    Procedure Laterality Date     ------------OTHER-------------      cataract eye surgery     BONE MARROW BIOPSY, BONE SPECIMEN, NEEDLE/TROCAR N/A 7/12/2017    Procedure: BIOPSY BONE MARROW;  BONE MARROW BIOPSY ;  Surgeon: Grace Vela MD;  Location:  GI     IMPLANT PACEMAKER  03/2017    AV block       Social History     Tobacco Use     Smoking status: Never Smoker     Smokeless tobacco: Never Used   Substance Use Topics     Alcohol use: No     Alcohol/week: 0.0 standard drinks     Family History   Problem Relation Age of Onset     Unknown/Adopted Mother      Unknown/Adopted Father          Current Outpatient Medications   Medication Sig Dispense Refill     acyclovir (ZOVIRAX) 400 MG tablet Take 1 tablet (400 mg) by mouth 2 times daily Viral Prophylaxis. 60 tablet 5     aspirin 81 MG chewable tablet Take 81 mg by mouth daily        dexamethasone (DECADRON) 4 MG tablet Take 1 tablet (4 mg) by mouth once a week Once a week with food on Days 1,8,15. 15 tablet 0     furosemide (LASIX) 20 MG tablet Take 1 tablet (20 mg) by mouth daily 90 tablet 3     LENalidomide (REVLIMID) 10 MG CAPS capsule Take 1 capsule (10 mg) by mouth daily for 14 days Take on Days 1 through 14 of 21-day cycle. 14 capsule 0     lisinopril (ZESTRIL) 2.5 MG tablet Take 1 tablet (2.5 mg) by mouth daily 90 tablet 3     Loperamide HCl (IMODIUM A-D PO) Take 1 tablet by mouth as needed        LORazepam (ATIVAN) 0.5 MG tablet Take 1 tablet (0.5 mg) by mouth every 6 hours as needed for anxiety 30 tablet 3     MYRBETRIQ 25 MG 24 hr tablet TAKE ONE TABLET BY MOUTH ONCE DAILY 90 tablet 1     nitroglycerin (NITROSTAT) 0.4 MG sublingual tablet For chest pain place 1 tablet under the tongue every 5 minutes for 3 doses. If symptoms persist 5 minutes after 1st dose call 911. 25 tablet 0     potassium chloride ER (KLOR-CON M) 10 MEQ CR tablet Take 1 tablet (10 mEq) by mouth 2 times daily 180 tablet 3     prochlorperazine (COMPAZINE) 10 MG tablet Take 1  "tablet (10 mg) by mouth every 6 hours as needed (Nausea/Vomiting) 30 tablet 6     vitamin B complex with vitamin C (VITAMIN  B COMPLEX) tablet Take 1 tablet by mouth daily       Zoledronic Acid (ZOMETA IV) Inject 4 mg into the vein every 3 months        LENalidomide (REVLIMID) 10 MG CAPS capsule Take 1 capsule (10 mg) by mouth daily for 14 days Take on Days 1 through 14 of 21-day cycle. 14 capsule 0     LENalidomide (REVLIMID) 10 MG CAPS capsule Take 1 capsule (10 mg) by mouth daily for 14 days Take on Days 1 through 14 of 21-day cycle. 14 capsule 0     Allergies   Allergen Reactions     Levaquin [Levofloxacin] Other (See Comments)     Tingling in feet after 1 dose on 8/7/19           Review of Systems  Constitutional, HEENT, cardiovascular, pulmonary, gi and gu systems are negative, except as otherwise noted.    OBJECTIVE:   BP (!) 88/55 (BP Location: Right arm, Patient Position: Sitting, Cuff Size: Adult Regular)   Pulse 69   Temp 97.5  F (36.4  C) (Skin)   Ht 1.575 m (5' 2\")   Wt 67.3 kg (148 lb 7 oz)   SpO2 93%   BMI 27.15 kg/m   Estimated body mass index is 27.15 kg/m  as calculated from the following:    Height as of this encounter: 1.575 m (5' 2\").    Weight as of this encounter: 67.3 kg (148 lb 7 oz).  Physical Exam  GENERAL: healthy, alert and no distress  EYES: Eyes grossly normal to inspection, PERRL and conjunctivae and sclerae normal  HENT: ear canals and TM's normal, nose and mouth without ulcers or lesions  NECK: no adenopathy, no asymmetry, masses, or scars and thyroid normal to palpation  RESP: lungs clear to auscultation - no rales, rhonchi or wheezes  CV: regular rate and rhythm, normal S1 S2, no S3 or S4, no murmur, click or rub, no peripheral edema and peripheral pulses strong  ABDOMEN: soft, nontender, no hepatosplenomegaly, no masses and bowel sounds normal  MS: no gross musculoskeletal defects noted, no edema  SKIN: no suspicious lesions or rashes  NEURO: Normal strength and tone, " mentation intact and speech normal  PSYCH: mentation appears normal, affect normal/bright    Diagnostic Test Results:  Labs reviewed in Epic    ASSESSMENT / PLAN:   1. Routine general medical examination at a health care facility      2. Multiple myeloma not having achieved remission (H)  Continue follow up with Sohan FELDMAN as direceted   - prochlorperazine (COMPAZINE) 10 MG tablet; Take 1 tablet (10 mg) by mouth every 6 hours as needed (Nausea/Vomiting)  Dispense: 30 tablet; Refill: 6    3. Chronic diastolic congestive heart failure (H)  Change KCL to smaller tab twice daily  Volume status look ok today  See below re blood pressure   - potassium chloride ER (KLOR-CON M) 10 MEQ CR tablet; Take 1 tablet (10 mEq) by mouth 2 times daily  Dispense: 180 tablet; Refill: 3    4. Benign essential hypertension  Reduce lisinopril from 10 to 2.5 daily to allow blood pressure room for diuretic recently started to control heart failure  Telephone follow up in 2 months to follow up home blood pressure readings upon doing so   - lisinopril (ZESTRIL) 2.5 MG tablet; Take 1 tablet (2.5 mg) by mouth daily  Dispense: 90 tablet; Refill: 3    COUNSELING:  Reviewed preventive health counseling, as reflected in patient instructions  Special attention given to:       Regular exercise       Healthy diet/nutrition       Immunizations    Reminded to get shingrix               She reports that she has never smoked. She has never used smokeless tobacco.      Appropriate preventive services were discussed with this patient, including applicable screening as appropriate for cardiovascular disease, diabetes, osteopenia/osteoporosis, and glaucoma.  As appropriate for age/gender, discussed screening for colorectal cancer, prostate cancer, breast cancer, and cervical cancer. Checklist reviewing preventive services available has been given to the patient.    Reviewed patients plan of care and provided an AVS. The Basic Care Plan (routine screening as  documented in Health Maintenance) for Quiana meets the Care Plan requirement. This Care Plan has been established and reviewed with the Patient and daughter.    Counseling Resources:  ATP IV Guidelines  Pooled Cohorts Equation Calculator  Breast Cancer Risk Calculator  Breast Cancer: Medication to Reduce Risk  FRAX Risk Assessment  ICSI Preventive Guidelines  Dietary Guidelines for Americans, 2010  USDA's MyPlate  ASA Prophylaxis  Lung CA Screening    César Chung MD  [facility]    Identified Health Risks:

## 2020-09-03 ENCOUNTER — MYC MEDICAL ADVICE (OUTPATIENT)
Dept: ONCOLOGY | Facility: CLINIC | Age: 85
End: 2020-09-03

## 2020-09-09 DIAGNOSIS — C90.00 MULTIPLE MYELOMA NOT HAVING ACHIEVED REMISSION (H): Primary | ICD-10-CM

## 2020-09-09 RX ORDER — DEXAMETHASONE 4 MG/1
4 TABLET ORAL WEEKLY
Qty: 15 TABLET | Refills: 0 | Status: SHIPPED | OUTPATIENT
Start: 2020-09-09 | End: 2020-10-07

## 2020-09-09 RX ORDER — LENALIDOMIDE 10 MG/1
10 CAPSULE ORAL DAILY
Qty: 14 CAPSULE | Refills: 0 | Status: SHIPPED | OUTPATIENT
Start: 2020-09-09 | End: 2020-10-07

## 2020-09-10 ENCOUNTER — TELEPHONE (OUTPATIENT)
Dept: PHARMACY | Facility: CLINIC | Age: 85
End: 2020-09-10

## 2020-09-10 NOTE — TELEPHONE ENCOUNTER
Oral Chemotherapy Monitoring Program   Medication: Revlimid  Rx: 10mg PO daily on days 1 through 14 of 21 day cycle   Auth #: [id number]   Obtained on: 9/10/2020   Risk Category: adult female of non child bearing potential  Routine survey questions reviewed.   Rx [facility] be Escribed to [facility]    Terrie Bowen [facility] Oncology Pharmacy Liaison  [phone]

## 2020-09-14 ENCOUNTER — HOSPITAL ENCOUNTER (OUTPATIENT)
Facility: CLINIC | Age: 85
Setting detail: SPECIMEN
Discharge: HOME | End: 2020-09-14
Attending: INTERNAL MEDICINE | Admitting: NURSE PRACTITIONER
Payer: MEDICARE

## 2020-09-14 ENCOUNTER — DOCUMENTATION ONLY (OUTPATIENT)
Dept: PHARMACY | Facility: CLINIC | Age: 85
End: 2020-09-14

## 2020-09-14 ENCOUNTER — INFUSION THERAPY VISIT (OUTPATIENT)
Dept: INFUSION THERAPY | Facility: CLINIC | Age: 85
End: 2020-09-14
Attending: INTERNAL MEDICINE
Payer: MEDICARE

## 2020-09-14 ENCOUNTER — ONCOLOGY VISIT (OUTPATIENT)
Dept: ONCOLOGY | Facility: CLINIC | Age: 85
End: 2020-09-14
Attending: NURSE PRACTITIONER
Payer: MEDICARE

## 2020-09-14 ENCOUNTER — TELEPHONE (OUTPATIENT)
Dept: ONCOLOGY | Facility: CLINIC | Age: 85
End: 2020-09-14

## 2020-09-14 VITALS
WEIGHT: 144 LBS | SYSTOLIC BLOOD PRESSURE: 113 MMHG | BODY MASS INDEX: 26.5 KG/M2 | TEMPERATURE: 98.6 F | DIASTOLIC BLOOD PRESSURE: 69 MMHG | HEART RATE: 86 BPM | HEIGHT: 62 IN | OXYGEN SATURATION: 95 % | RESPIRATION RATE: 16 BRPM

## 2020-09-14 DIAGNOSIS — C90.00 MULTIPLE MYELOMA NOT HAVING ACHIEVED REMISSION (H): Primary | ICD-10-CM

## 2020-09-14 DIAGNOSIS — C90.00 MULTIPLE MYELOMA NOT HAVING ACHIEVED REMISSION (H): ICD-10-CM

## 2020-09-14 LAB
ALBUMIN SERPL-MCNC: 3.7 G/DL (ref 3.4–5)
ALP SERPL-CCNC: 78 U/L (ref 40–150)
ALT SERPL W P-5'-P-CCNC: 33 U/L (ref 0–50)
ANION GAP SERPL CALCULATED.3IONS-SCNC: 4 MMOL/L (ref 3–14)
AST SERPL W P-5'-P-CCNC: 19 U/L (ref 0–45)
BASOPHILS # BLD AUTO: 0 10E9/L (ref 0–0.2)
BASOPHILS NFR BLD AUTO: 0.5 %
BILIRUB SERPL-MCNC: 0.6 MG/DL (ref 0.2–1.3)
BUN SERPL-MCNC: 23 MG/DL (ref 7–30)
CALCIUM SERPL-MCNC: 9.7 MG/DL (ref 8.5–10.1)
CHLORIDE SERPL-SCNC: 107 MMOL/L (ref 94–109)
CO2 SERPL-SCNC: 29 MMOL/L (ref 20–32)
CREAT SERPL-MCNC: 0.68 MG/DL (ref 0.52–1.04)
DIFFERENTIAL METHOD BLD: ABNORMAL
EOSINOPHIL # BLD AUTO: 0 10E9/L (ref 0–0.7)
EOSINOPHIL NFR BLD AUTO: 0.2 %
ERYTHROCYTE [DISTWIDTH] IN BLOOD BY AUTOMATED COUNT: 13.1 % (ref 10–15)
GFR SERPL CREATININE-BSD FRML MDRD: 80 ML/MIN/{1.73_M2}
GLUCOSE SERPL-MCNC: 149 MG/DL (ref 70–99)
HCT VFR BLD AUTO: 38 % (ref 35–47)
HGB BLD-MCNC: 13 G/DL (ref 11.7–15.7)
IMM GRANULOCYTES # BLD: 0 10E9/L (ref 0–0.4)
IMM GRANULOCYTES NFR BLD: 0.5 %
LYMPHOCYTES # BLD AUTO: 0.8 10E9/L (ref 0.8–5.3)
LYMPHOCYTES NFR BLD AUTO: 18 %
MCH RBC QN AUTO: 32 PG (ref 26.5–33)
MCHC RBC AUTO-ENTMCNC: 34.2 G/DL (ref 31.5–36.5)
MCV RBC AUTO: 94 FL (ref 78–100)
MONOCYTES # BLD AUTO: 0.1 10E9/L (ref 0–1.3)
MONOCYTES NFR BLD AUTO: 2.2 %
NEUTROPHILS # BLD AUTO: 3.3 10E9/L (ref 1.6–8.3)
NEUTROPHILS NFR BLD AUTO: 78.6 %
NRBC # BLD AUTO: 0 10*3/UL
NRBC BLD AUTO-RTO: 0 /100
PLATELET # BLD AUTO: 141 10E9/L (ref 150–450)
POTASSIUM SERPL-SCNC: 4.1 MMOL/L (ref 3.4–5.3)
PROT SERPL-MCNC: 7.4 G/DL (ref 6.8–8.8)
RBC # BLD AUTO: 4.06 10E12/L (ref 3.8–5.2)
SODIUM SERPL-SCNC: 140 MMOL/L (ref 133–144)
WBC # BLD AUTO: 4.2 10E9/L (ref 4–11)

## 2020-09-14 PROCEDURE — 36415 COLL VENOUS BLD VENIPUNCTURE: CPT

## 2020-09-14 PROCEDURE — 80053 COMPREHEN METABOLIC PANEL: CPT | Performed by: NURSE PRACTITIONER

## 2020-09-14 PROCEDURE — 85025 COMPLETE CBC W/AUTO DIFF WBC: CPT | Performed by: NURSE PRACTITIONER

## 2020-09-14 PROCEDURE — 99214 OFFICE O/P EST MOD 30 MIN: CPT | Performed by: NURSE PRACTITIONER

## 2020-09-14 PROCEDURE — G0463 HOSPITAL OUTPT CLINIC VISIT: HCPCS

## 2020-09-14 ASSESSMENT — PAIN SCALES - GENERAL: PAINLEVEL: MODERATE PAIN (5)

## 2020-09-14 ASSESSMENT — MIFFLIN-ST. JEOR: SCORE: 1056.43

## 2020-09-14 NOTE — TELEPHONE ENCOUNTER
Left voicemail message regarding reschedule of lab appointment - change from 10/5 to 9/30 per Juaquin.

## 2020-09-14 NOTE — PROGRESS NOTES
Oral Chemotherapy Monitoring Program  Lab Follow Up    Patient currently on Lenalidomide/dex therapy for MM.    Reviewed lab results from 9/14/20.    No concerning abnormalities.    Assessment & Plan:  Continue current therapy.     Questions answered to patient's satisfaction.    Follow-Up:  10/5 with labs    Yohan Leal PharmD  September 14, 2020

## 2020-09-14 NOTE — LETTER
"    9/14/2020         RE: Quiana Dunaway  [address] [facility] [address]        Dear Colleague,    Thank you for referring your patient, Quiana Dunaway, to the [facility]. Please see a copy of my visit note below.    Oncology Rooming Note    September 14, 2020 3:41 PM   Quiana Dunaway is a 85 year old female who presents for:    Chief Complaint   Patient presents with     Oncology Clinic Visit     Initial Vitals: /69   Pulse 86   Temp 98.6  F (37  C) (Oral)   Resp 16   Ht 1.575 m (5' 2\")   Wt 65.3 kg (144 lb)   SpO2 95%   BMI 26.34 kg/m   Estimated body mass index is 26.34 kg/m  as calculated from the following:    Height as of this encounter: 1.575 m (5' 2\").    Weight as of this encounter: 65.3 kg (144 lb). Body surface area is 1.69 meters squared.  Moderate Pain (5) Comment: Data Unavailable   No LMP recorded. Patient is postmenopausal.  Allergies reviewed: Yes  Medications reviewed: Yes    Medications: MEDICATION REFILLS NEEDED TODAY. Provider was notified.  Pharmacy name entered into [facility]: [facility] - [place], [place] - [address]    Clinical concerns:  NP was notified.     WOULD LIKE SOMETHING FOR NEUROPATHY      Mariela Lyn CMA                Subjective  Patient reports she has had neuropathy for month now.  She is ambulatory she denies swelling of the feet denies redness.  She is ambulatory.  Patient reports she does not want to see another doctor for it or to start any new medication for it.  Patient also reports she has been more tired lately.  Denies fever chills sweats cough shortness of breath chest pain  Denies nausea vomiting diarrhea abdominal pain bleeding       ROS: 14 point ROS neg other than the symptoms noted above in the HPI.        Physical Exam  Vitals signs reviewed.   HENT:      Head: Normocephalic.      Nose: Nose normal.   Eyes:      Pupils: Pupils are equal, round, and reactive to light.   Neck:      " Musculoskeletal: Normal range of motion.   Cardiovascular:      Rate and Rhythm: Normal rate.      Pulses: Normal pulses.   Pulmonary:      Effort: Pulmonary effort is normal.   Abdominal:      General: Abdomen is flat.   Musculoskeletal: Normal range of motion.   Neurological:      Mental Status: She is alert.           ALLERGIES  Levaquin [levofloxacin]       Results for DUGLAS CASTILLO (MRN [id number]) as of 9/14/2020 16:15   Ref. Range 9/14/2020 15:33   Sodium Latest Ref Range: 133 - 144 mmol/L 140   Potassium Latest Ref Range: 3.4 - 5.3 mmol/L 4.1   Chloride Latest Ref Range: 94 - 109 mmol/L 107   Carbon Dioxide Latest Ref Range: 20 - 32 mmol/L 29   Urea Nitrogen Latest Ref Range: 7 - 30 mg/dL 23   Creatinine Latest Ref Range: 0.52 - 1.04 mg/dL 0.68   GFR Estimate Latest Ref Range: >60 mL/min/1.73_m2 80   GFR Estimate If Black Latest Ref Range: >60 mL/min/1.73_m2 >90   Calcium Latest Ref Range: 8.5 - 10.1 mg/dL 9.7   Anion Gap Latest Ref Range: 3 - 14 mmol/L 4   Albumin Latest Ref Range: 3.4 - 5.0 g/dL 3.7   Protein Total Latest Ref Range: 6.8 - 8.8 g/dL 7.4   Bilirubin Total Latest Ref Range: 0.2 - 1.3 mg/dL 0.6   Alkaline Phosphatase Latest Ref Range: 40 - 150 U/L 78   ALT Latest Ref Range: 0 - 50 U/L 33   AST Latest Ref Range: 0 - 45 U/L 19   Glucose Latest Ref Range: 70 - 99 mg/dL 149 (H)   WBC Latest Ref Range: 4.0 - 11.0 10e9/L 4.2   Hemoglobin Latest Ref Range: 11.7 - 15.7 g/dL 13.0   Hematocrit Latest Ref Range: 35.0 - 47.0 % 38.0   Platelet Count Latest Ref Range: 150 - 450 10e9/L 141 (L)   RBC Count Latest Ref Range: 3.8 - 5.2 10e12/L 4.06   MCV Latest Ref Range: 78 - 100 fl 94   MCH Latest Ref Range: 26.5 - 33.0 pg 32.0   MCHC Latest Ref Range: 31.5 - 36.5 g/dL 34.2   RDW Latest Ref Range: 10.0 - 15.0 % 13.1   Diff Method Unknown Automated Method   % Neutrophils Latest Units: % 78.6   % Lymphocytes Latest Units: % 18.0   % Monocytes Latest Units: % 2.2   % Eosinophils Latest Units: % 0.2   %  Basophils Latest Units: % 0.5   % Immature Granulocytes Latest Units: % 0.5   Nucleated RBCs Latest Ref Range: 0 /100 0   Absolute Neutrophil Latest Ref Range: 1.6 - 8.3 10e9/L 3.3   Absolute Lymphocytes Latest Ref Range: 0.8 - 5.3 10e9/L 0.8   Absolute Monocytes Latest Ref Range: 0.0 - 1.3 10e9/L 0.1   Absolute Eosinophils Latest Ref Range: 0.0 - 0.7 10e9/L 0.0   Absolute Basophils Latest Ref Range: 0.0 - 0.2 10e9/L 0.0   Abs Immature Granulocytes Latest Ref Range: 0 - 0.4 10e9/L 0.0   Absolute Nucleated RBC Unknown 0.0         Assessment and plan    This is a 85-year-old female with    Multiple myeloma  07/13 labs showed progression of the disease.  Revlimid increased to 10 mg daily and dexamethasone 20 mg a week  -Patient is having some more fatigue now. I would like to get multiple myeloma labs today  -Patient refused additional labs today   CBC and CMP results reviewed with patient  Patient reports she will get multiple myeloma labs 1 week before she sees Dr. Parry on 10/07    Prophylaxis  Continue with aspirin and acyclovir    Bone health  Labs reviewed okay to give Zometa.  Patient continue with Zometa every 3 months      Fatigue  Differential diagnosis discussed with patient.  patient denies signs or symptoms of infection , denies edema , denies cough no shortness of breath chest pain I told patient that fatigue might be also  due to progression of multiple myeloma as seen on her 07 /13 labs.  I wanted to get  multiple myeloma labs today-pt  refuses.  She also has  history of CHF 05/2020 ejection fraction is estimated at 60-65%  Advised patient to follow-up with cardiologist as well   Patient is advised to call us or go to ER  if worsening of fatigue or any changes in health condition      Peripheral neuropathy  -Patient declined visit with neurologist  She also does not want Neurontin.  discussed acupuncture  -Call if worsening of symptoms        Again, thank you for allowing me to participate in the care  of your patient.        Sincerely,        MARIUSZ Mckeon CNP

## 2020-09-14 NOTE — PROGRESS NOTES
Subjective  Patient reports she has had neuropathy for month now.  She is ambulatory she denies swelling of the feet denies redness.  She is ambulatory.  Patient reports she does not want to see another doctor for it or to start any new medication for it.  Patient also reports she has been more tired lately.  Denies fever chills sweats cough shortness of breath chest pain  Denies nausea vomiting diarrhea abdominal pain bleeding       ROS: 14 point ROS neg other than the symptoms noted above in the HPI.        Physical Exam  Vitals signs reviewed.   HENT:      Head: Normocephalic.      Nose: Nose normal.   Eyes:      Pupils: Pupils are equal, round, and reactive to light.   Neck:      Musculoskeletal: Normal range of motion.   Cardiovascular:      Rate and Rhythm: Normal rate.      Pulses: Normal pulses.   Pulmonary:      Effort: Pulmonary effort is normal.   Abdominal:      General: Abdomen is flat.   Musculoskeletal: Normal range of motion.   Neurological:      Mental Status: She is alert.           ALLERGIES  Levaquin [levofloxacin]       Results for DUGLAS CASTILLO (MRN [id number]) as of 9/14/2020 16:15   Ref. Range 9/14/2020 15:33   Sodium Latest Ref Range: 133 - 144 mmol/L 140   Potassium Latest Ref Range: 3.4 - 5.3 mmol/L 4.1   Chloride Latest Ref Range: 94 - 109 mmol/L 107   Carbon Dioxide Latest Ref Range: 20 - 32 mmol/L 29   Urea Nitrogen Latest Ref Range: 7 - 30 mg/dL 23   Creatinine Latest Ref Range: 0.52 - 1.04 mg/dL 0.68   GFR Estimate Latest Ref Range: >60 mL/min/1.73_m2 80   GFR Estimate If Black Latest Ref Range: >60 mL/min/1.73_m2 >90   Calcium Latest Ref Range: 8.5 - 10.1 mg/dL 9.7   Anion Gap Latest Ref Range: 3 - 14 mmol/L 4   Albumin Latest Ref Range: 3.4 - 5.0 g/dL 3.7   Protein Total Latest Ref Range: 6.8 - 8.8 g/dL 7.4   Bilirubin Total Latest Ref Range: 0.2 - 1.3 mg/dL 0.6   Alkaline Phosphatase Latest Ref Range: 40 - 150 U/L 78   ALT Latest Ref Range: 0 - 50 U/L 33   AST Latest Ref  Range: 0 - 45 U/L 19   Glucose Latest Ref Range: 70 - 99 mg/dL 149 (H)   WBC Latest Ref Range: 4.0 - 11.0 10e9/L 4.2   Hemoglobin Latest Ref Range: 11.7 - 15.7 g/dL 13.0   Hematocrit Latest Ref Range: 35.0 - 47.0 % 38.0   Platelet Count Latest Ref Range: 150 - 450 10e9/L 141 (L)   RBC Count Latest Ref Range: 3.8 - 5.2 10e12/L 4.06   MCV Latest Ref Range: 78 - 100 fl 94   MCH Latest Ref Range: 26.5 - 33.0 pg 32.0   MCHC Latest Ref Range: 31.5 - 36.5 g/dL 34.2   RDW Latest Ref Range: 10.0 - 15.0 % 13.1   Diff Method Unknown Automated Method   % Neutrophils Latest Units: % 78.6   % Lymphocytes Latest Units: % 18.0   % Monocytes Latest Units: % 2.2   % Eosinophils Latest Units: % 0.2   % Basophils Latest Units: % 0.5   % Immature Granulocytes Latest Units: % 0.5   Nucleated RBCs Latest Ref Range: 0 /100 0   Absolute Neutrophil Latest Ref Range: 1.6 - 8.3 10e9/L 3.3   Absolute Lymphocytes Latest Ref Range: 0.8 - 5.3 10e9/L 0.8   Absolute Monocytes Latest Ref Range: 0.0 - 1.3 10e9/L 0.1   Absolute Eosinophils Latest Ref Range: 0.0 - 0.7 10e9/L 0.0   Absolute Basophils Latest Ref Range: 0.0 - 0.2 10e9/L 0.0   Abs Immature Granulocytes Latest Ref Range: 0 - 0.4 10e9/L 0.0   Absolute Nucleated RBC Unknown 0.0         Assessment and plan    This is a 85-year-old female with    Multiple myeloma  07/13 labs showed progression of the disease.  Revlimid increased to 10 mg daily and dexamethasone 20 mg a week  -Patient is having some more fatigue now. I would like to get multiple myeloma labs today  -Patient refused additional labs today   CBC and CMP results reviewed with patient  Patient reports she will get multiple myeloma labs 1 week before she sees Dr. Parry on 10/07    Prophylaxis  Continue with aspirin and acyclovir    Bone health  Labs reviewed okay to give Zometa.  Patient continue with Zometa every 3 months      Fatigue  Differential diagnosis discussed with patient.  patient denies signs or symptoms of infection ,  denies edema , denies cough no shortness of breath chest pain I told patient that fatigue might be also  due to progression of multiple myeloma as seen on her 07 /13 labs.  I wanted to get  multiple myeloma labs today-pt  refuses.  She also has  history of CHF 05/2020 ejection fraction is estimated at 60-65%  Advised patient to follow-up with cardiologist as well   Patient is advised to call us or go to ER  if worsening of fatigue or any changes in health condition      Peripheral neuropathy  -Patient declined visit with neurologist  She also does not want Neurontin.  discussed acupuncture  -Call if worsening of symptoms

## 2020-09-14 NOTE — PROGRESS NOTES
"Oncology Rooming Note    September 14, 2020 3:41 PM   Quiana Dunaway is a 85 year old female who presents for:    Chief Complaint   Patient presents with     Oncology Clinic Visit     Initial Vitals: /69   Pulse 86   Temp 98.6  F (37  C) (Oral)   Resp 16   Ht 1.575 m (5' 2\")   Wt 65.3 kg (144 lb)   SpO2 95%   BMI 26.34 kg/m   Estimated body mass index is 26.34 kg/m  as calculated from the following:    Height as of this encounter: 1.575 m (5' 2\").    Weight as of this encounter: 65.3 kg (144 lb). Body surface area is 1.69 meters squared.  Moderate Pain (5) Comment: Data Unavailable   No LMP recorded. Patient is postmenopausal.  Allergies reviewed: Yes  Medications reviewed: Yes    Medications: MEDICATION REFILLS NEEDED TODAY. Provider was notified.  Pharmacy name entered into [facility]: [facility] - [place], [place] - [address]    Clinical concerns:  NP was notified.     WOULD LIKE SOMETHING FOR NEUROPATHY      Mariela Lyn CMA            "

## 2020-09-14 NOTE — PROGRESS NOTES
Medical Assistant Note:  Quiana Dunaway presents today for BLOOD DRAW.    Patient seen by provider today: Yes: MARGUERITE  .   present during visit today: Not Applicable.    Concerns: No Concerns.    Procedure:  Lab draw site: LAC, Needle type: BF, Gauge: 23.    Post Assessment:  Labs drawn without difficulty: Yes.    Discharge Plan:  Departure Mode: Ambulatory.    Face to Face Time: 5 MIN  .    Mariela Lyn, CMA

## 2020-09-24 DIAGNOSIS — C90.00 MULTIPLE MYELOMA NOT HAVING ACHIEVED REMISSION (H): Primary | ICD-10-CM

## 2020-09-24 RX ORDER — DEXAMETHASONE 4 MG/1
4 TABLET ORAL WEEKLY
Qty: 15 TABLET | Refills: 0 | Status: SHIPPED | OUTPATIENT
Start: 2020-09-24 | End: 2021-01-14

## 2020-09-24 RX ORDER — LENALIDOMIDE 10 MG/1
10 CAPSULE ORAL DAILY
Qty: 14 CAPSULE | Refills: 0 | Status: SHIPPED | OUTPATIENT
Start: 2020-09-24 | End: 2021-01-14

## 2020-09-29 ENCOUNTER — TELEPHONE (OUTPATIENT)
Dept: PHARMACY | Facility: CLINIC | Age: 85
End: 2020-09-29

## 2020-09-29 NOTE — TELEPHONE ENCOUNTER
Oral Chemotherapy Monitoring Program   Medication: Revlimid  Rx: 10mg PO daily on days 1 through 14 of 21 day cycle   Auth #: [id number]  Obtained on: 09/29/2020   Risk Category: adult female of non child bearing potential  Routine survey questions reviewed.   Rx to be Escribed to [facility]    Terrie Bowen [facility] Oncology Pharmacy Liaison  [phone]

## 2020-10-02 DIAGNOSIS — C90.00 MULTIPLE MYELOMA NOT HAVING ACHIEVED REMISSION (H): ICD-10-CM

## 2020-10-02 RX ORDER — ACYCLOVIR 400 MG/1
400 TABLET ORAL 2 TIMES DAILY
Qty: 60 TABLET | Refills: 5 | Status: SHIPPED | OUTPATIENT
Start: 2020-10-02 | End: 2021-02-10

## 2020-10-05 ENCOUNTER — HOSPITAL ENCOUNTER (OUTPATIENT)
Facility: CLINIC | Age: 85
Setting detail: SPECIMEN
Discharge: HOME | End: 2020-10-05
Attending: INTERNAL MEDICINE | Admitting: INTERNAL MEDICINE
Payer: MEDICARE

## 2020-10-05 ENCOUNTER — DOCUMENTATION ONLY (OUTPATIENT)
Dept: PHARMACY | Facility: CLINIC | Age: 85
End: 2020-10-05

## 2020-10-05 ENCOUNTER — INFUSION THERAPY VISIT (OUTPATIENT)
Dept: INFUSION THERAPY | Facility: CLINIC | Age: 85
End: 2020-10-05
Attending: INTERNAL MEDICINE
Payer: MEDICARE

## 2020-10-05 DIAGNOSIS — C90.00 MULTIPLE MYELOMA NOT HAVING ACHIEVED REMISSION (H): Primary | ICD-10-CM

## 2020-10-05 LAB
ALBUMIN SERPL-MCNC: 3.6 G/DL (ref 3.4–5)
ALP SERPL-CCNC: 83 U/L (ref 40–150)
ALT SERPL W P-5'-P-CCNC: 28 U/L (ref 0–50)
ANION GAP SERPL CALCULATED.3IONS-SCNC: 4 MMOL/L (ref 3–14)
AST SERPL W P-5'-P-CCNC: 18 U/L (ref 0–45)
BASOPHILS # BLD AUTO: 0 10E9/L (ref 0–0.2)
BASOPHILS NFR BLD AUTO: 0.7 %
BILIRUB SERPL-MCNC: 0.7 MG/DL (ref 0.2–1.3)
BUN SERPL-MCNC: 20 MG/DL (ref 7–30)
CALCIUM SERPL-MCNC: 9.1 MG/DL (ref 8.5–10.1)
CHLORIDE SERPL-SCNC: 110 MMOL/L (ref 94–109)
CO2 SERPL-SCNC: 27 MMOL/L (ref 20–32)
CREAT SERPL-MCNC: 0.66 MG/DL (ref 0.52–1.04)
DIFFERENTIAL METHOD BLD: ABNORMAL
EOSINOPHIL # BLD AUTO: 0 10E9/L (ref 0–0.7)
EOSINOPHIL NFR BLD AUTO: 0.3 %
ERYTHROCYTE [DISTWIDTH] IN BLOOD BY AUTOMATED COUNT: 13 % (ref 10–15)
GFR SERPL CREATININE-BSD FRML MDRD: 81 ML/MIN/{1.73_M2}
GLUCOSE SERPL-MCNC: 191 MG/DL (ref 70–99)
HCT VFR BLD AUTO: 35.7 % (ref 35–47)
HGB BLD-MCNC: 12.4 G/DL (ref 11.7–15.7)
IMM GRANULOCYTES # BLD: 0 10E9/L (ref 0–0.4)
IMM GRANULOCYTES NFR BLD: 0.7 %
LYMPHOCYTES # BLD AUTO: 0.6 10E9/L (ref 0.8–5.3)
LYMPHOCYTES NFR BLD AUTO: 18.6 %
MCH RBC QN AUTO: 32.7 PG (ref 26.5–33)
MCHC RBC AUTO-ENTMCNC: 34.7 G/DL (ref 31.5–36.5)
MCV RBC AUTO: 94 FL (ref 78–100)
MONOCYTES # BLD AUTO: 0.1 10E9/L (ref 0–1.3)
MONOCYTES NFR BLD AUTO: 1.7 %
NEUTROPHILS # BLD AUTO: 2.4 10E9/L (ref 1.6–8.3)
NEUTROPHILS NFR BLD AUTO: 78 %
NRBC # BLD AUTO: 0 10*3/UL
NRBC BLD AUTO-RTO: 0 /100
PLATELET # BLD AUTO: 144 10E9/L (ref 150–450)
POTASSIUM SERPL-SCNC: 3.9 MMOL/L (ref 3.4–5.3)
PROT SERPL-MCNC: 7 G/DL (ref 6.8–8.8)
RBC # BLD AUTO: 3.79 10E12/L (ref 3.8–5.2)
SODIUM SERPL-SCNC: 141 MMOL/L (ref 133–144)
WBC # BLD AUTO: 3 10E9/L (ref 4–11)

## 2020-10-05 PROCEDURE — 36415 COLL VENOUS BLD VENIPUNCTURE: CPT

## 2020-10-05 PROCEDURE — 80053 COMPREHEN METABOLIC PANEL: CPT | Performed by: INTERNAL MEDICINE

## 2020-10-05 PROCEDURE — 99207 PR NO CHARGE LOS: CPT

## 2020-10-05 PROCEDURE — 85025 COMPLETE CBC W/AUTO DIFF WBC: CPT | Performed by: INTERNAL MEDICINE

## 2020-10-05 PROCEDURE — 36591 DRAW BLOOD OFF VENOUS DEVICE: CPT

## 2020-10-05 NOTE — PROGRESS NOTES
Medical Assistant Note:  Quiana Dunaway presents today for lab draw.    Patient seen by provider today: No.   present during visit today: Not Applicable.    Concerns: No Concerns.    Procedure:  Lab draw site: LAC, Needle type: BF, Gauge: 21. Gauze and coban applied    Post Assessment:  Labs drawn without difficulty: Yes.    Discharge Plan:  Departure Mode: Ambulatory.    Face to Face Time: 5.    Sultana Jones CMA

## 2020-10-05 NOTE — PROGRESS NOTES
Oral Chemotherapy Monitoring Program.    Patient currently on lenalidomide therapy.    Reviewed labs from 10/5/20. No concerning abnormalities.    Plan: Ok to continue with lenalidomide. Repeat labs every 4 weeks per Dr. Parry.    Debra Ortega PharmD  October 5, 2020

## 2020-10-07 ENCOUNTER — HOSPITAL ENCOUNTER (OUTPATIENT)
Facility: CLINIC | Age: 85
Setting detail: SPECIMEN
Discharge: HOME | End: 2020-10-07
Attending: INTERNAL MEDICINE | Admitting: INTERNAL MEDICINE
Payer: MEDICARE

## 2020-10-07 ENCOUNTER — INFUSION THERAPY VISIT (OUTPATIENT)
Dept: INFUSION THERAPY | Facility: CLINIC | Age: 85
End: 2020-10-07
Attending: INTERNAL MEDICINE
Payer: MEDICARE

## 2020-10-07 ENCOUNTER — ONCOLOGY VISIT (OUTPATIENT)
Dept: ONCOLOGY | Facility: CLINIC | Age: 85
End: 2020-10-07
Attending: INTERNAL MEDICINE
Payer: MEDICARE

## 2020-10-07 VITALS
TEMPERATURE: 98.5 F | SYSTOLIC BLOOD PRESSURE: 136 MMHG | HEART RATE: 83 BPM | HEIGHT: 62 IN | OXYGEN SATURATION: 97 % | BODY MASS INDEX: 27.6 KG/M2 | RESPIRATION RATE: 16 BRPM | DIASTOLIC BLOOD PRESSURE: 82 MMHG | WEIGHT: 150 LBS

## 2020-10-07 DIAGNOSIS — C90.00 MULTIPLE MYELOMA NOT HAVING ACHIEVED REMISSION (H): Primary | ICD-10-CM

## 2020-10-07 PROCEDURE — 83883 ASSAY NEPHELOMETRY NOT SPEC: CPT | Performed by: INTERNAL MEDICINE

## 2020-10-07 PROCEDURE — G0463 HOSPITAL OUTPT CLINIC VISIT: HCPCS | Mod: 25

## 2020-10-07 PROCEDURE — 96365 THER/PROPH/DIAG IV INF INIT: CPT

## 2020-10-07 PROCEDURE — 82784 ASSAY IGA/IGD/IGG/IGM EACH: CPT | Performed by: INTERNAL MEDICINE

## 2020-10-07 PROCEDURE — G0008 ADMIN INFLUENZA VIRUS VAC: HCPCS | Performed by: INTERNAL MEDICINE

## 2020-10-07 PROCEDURE — 99207 PR NO CHARGE LOS: CPT

## 2020-10-07 PROCEDURE — 84165 PROTEIN E-PHORESIS SERUM: CPT | Mod: TC | Performed by: INTERNAL MEDICINE

## 2020-10-07 PROCEDURE — 99214 OFFICE O/P EST MOD 30 MIN: CPT | Performed by: INTERNAL MEDICINE

## 2020-10-07 PROCEDURE — 90662 IIV NO PRSV INCREASED AG IM: CPT | Performed by: INTERNAL MEDICINE

## 2020-10-07 PROCEDURE — 250N000011 HC RX IP 250 OP 636: Performed by: INTERNAL MEDICINE

## 2020-10-07 PROCEDURE — 999N001036 HC STATISTIC TOTAL PROTEIN: Performed by: INTERNAL MEDICINE

## 2020-10-07 RX ORDER — ZOLEDRONIC ACID 0.04 MG/ML
4 INJECTION, SOLUTION INTRAVENOUS ONCE
Status: CANCELLED | OUTPATIENT
Start: 2020-10-07 | End: 2020-10-07

## 2020-10-07 RX ORDER — ZOLEDRONIC ACID 0.04 MG/ML
4 INJECTION, SOLUTION INTRAVENOUS ONCE
Status: COMPLETED | OUTPATIENT
Start: 2020-10-07 | End: 2020-10-07

## 2020-10-07 RX ADMIN — INFLUENZA A VIRUS A/MICHIGAN/45/2015 X-275 (H1N1) ANTIGEN (FORMALDEHYDE INACTIVATED), INFLUENZA A VIRUS A/SINGAPORE/INFIMH-16-0019/2016 IVR-186 (H3N2) ANTIGEN (FORMALDEHYDE INACTIVATED), INFLUENZA B VIRUS B/PHUKET/3073/2013 ANTIGEN (FORMALDEHYDE INACTIVATED), AND INFLUENZA B VIRUS B/MARYLAND/15/2016 BX-69A ANTIGEN (FORMALDEHYDE INACTIVATED) 0.7 ML: 60; 60; 60; 60 INJECTION, SUSPENSION INTRAMUSCULAR at 14:54

## 2020-10-07 RX ADMIN — ZOLEDRONIC ACID 4 MG: 0.04 INJECTION, SOLUTION INTRAVENOUS at 14:54

## 2020-10-07 ASSESSMENT — PAIN SCALES - GENERAL: PAINLEVEL: NO PAIN (0)

## 2020-10-07 ASSESSMENT — MIFFLIN-ST. JEOR: SCORE: 1083.65

## 2020-10-07 NOTE — PATIENT INSTRUCTIONS
1. Flu shot.  2. Labs today.  3. Zometa today.  4. Continue revlimid and dexamethasone.  5. Continue aspirin and acyclovir.  6. See Juaquin Hammond in a month with labs.  7. See me in 2 months with labs.

## 2020-10-07 NOTE — PROGRESS NOTES
"Oncology Rooming Note    October 7, 2020 2:01 PM   Quiana Dunaway is a 85 year old female who presents for:    Chief Complaint   Patient presents with     Oncology Clinic Visit     Initial Vitals: There were no vitals taken for this visit. Estimated body mass index is 26.34 kg/m  as calculated from the following:    Height as of 9/14/20: 1.575 m (5' 2\").    Weight as of 9/14/20: 65.3 kg (144 lb). There is no height or weight on file to calculate BSA.  Data Unavailable Comment: Data Unavailable   No LMP recorded. Patient is postmenopausal.  Allergies reviewed: Yes  Medications reviewed: Yes    Medications: Medication refills not needed today.  Pharmacy name entered into [facility]: [facility] - [place], [place] - [address]    Clinical concerns:  doctor was notified.      Jacquie Mcdaniel MA            "

## 2020-10-07 NOTE — LETTER
"    10/7/2020         RE: Quiana Dunaway  [address] [facility] [address]        Dear Colleague,    Thank you for referring your patient, Quiana Dunaway, to the [facility]. Please see a copy of my visit note below.    Oncology Rooming Note    October 7, 2020 2:01 PM   Quiana Dunaway is a 85 year old female who presents for:    Chief Complaint   Patient presents with     Oncology Clinic Visit     Initial Vitals: There were no vitals taken for this visit. Estimated body mass index is 26.34 kg/m  as calculated from the following:    Height as of 9/14/20: 1.575 m (5' 2\").    Weight as of 9/14/20: 65.3 kg (144 lb). There is no height or weight on file to calculate BSA.  Data Unavailable Comment: Data Unavailable   No LMP recorded. Patient is postmenopausal.  Allergies reviewed: Yes  Medications reviewed: Yes    Medications: Medication refills not needed today.  Pharmacy name entered into [facility]: [facility] - [place], [place] - [address]    Clinical concerns:  doctor was notified.      Jacquie Mcdaniel MA              Visit Date:   10/07/2020     HEMATOLOGY HISTORY: Mrs. Dunaway is a lady with multiple myeloma (IgG kappa). High risk, t(14;16).  1. On 05/01/2017, WBC of 3.4, hemoglobin of 10.2 and platelet of 154.  2. SPEP on 07/07/2017 revealed M-spike of 1.8.  3. Bone marrow biopsy on 07/12/2017 reveals kappa monotypic plasma cell population consistent with multiple myeloma.  Bone marrow is 70% cellular.  Plasma cell is 50-60%.     -There is gain of chromosome 1, 5, 9, 15, and 17.  There is translocation 14;16.   4.  On 07/18/2017:  -M-spike of 1.9.   -Immunofixation reveals monoclonal IgG kappa.    -IgG level of 2970.  IgA of 15 and IgM of 175.    -Kappa free light chain of 67.7.  Lambda free light chain of 1.42.  Ratio of kappa to lambda of 47.71.    -Beta 2 microglobulin of 3.4.   5. PET scan on 07/24/2017 reveals several focal areas of increased " FDG uptake consistent with multiple myeloma.  There is probable epithelial cyst in tail of the pancreas.  No associated abnormal FDG activity.  Left thyroid cysts or nodules without any FDG activity.  There is a 0.3 cm left upper lobe lung nodule.   8.  Velcade, Revlimid and dexamethasone between on 08/14/2017 and 04/30/2018. Velcade stopped.   -Revlimid and dexamethasone continued.     SUBJECTIVE:  Ms. Dunaway is an 85-year-old female with multiple myeloma.  She is on Revlimid 10 mg a day, 2 weeks on and 1 week off.  She is on dexamethasone 4 mg once a week.      The patient has some side effects of Revlimid.  She has fatigue.  Also, she gets more short of breath when she is on Revlimid.  When she is off Revlimid, her shortness of breath is better.  She had multiple investigations done including lower extremity ultrasound and CT chest angiogram which were negative for thrombosis.  Echocardiogram does not reveal any cardiomyopathy or decreased ejection fraction.  She has been following up with a cardiologist. The patient says that overall she can live with his shortness of breath.      She has fatigue.  No headache.  No dizziness.  No chest pain.  No nausea or vomiting.  Appetite is fairly good.  No fever or chills.      All other review of systems negative.      PHYSICAL EXAMINATION:   GENERAL:  Alert and oriented x 3.   VITAL SIGNS:  Reviewed.  ECOG PS of 2.     EYES:  No icterus.   THROAT:  No ulcer. No thrush.   NECK:  Supple. No lymphadenopathy. No thyromegaly.   AXILLAE:  No lymphadenopathy.   LUNGS:  Good air entry bilaterally.  No crackles or wheezing.   HEART:  Regular.  No murmur.   GI: Abdomen is soft.  Nontender. No mass. Difficult palpation because of her weight.  EXTREMITIES:  No pedal edema.  No calf swelling or tenderness.   SKIN:  No rash.  -At angle of mouth on the left side, there is a small skin nodule.  Not ulcerated.  No bleeding from it.      LABORATORY DATA:  Reviewed.      ASSESSMENT:   1.   An 85-year-old female with high-risk multiple myeloma on Revlimid.    2.  Fatigue.   3.  Shortness of breath.  This is from Revlimid.   4.  Mild leukopenia from Revlimid.   5.  Mild thrombocytopenia from myeloma and Revlimid.      PLAN:   1. I discussed regarding her shortness of breath.  It mainly happens when she is on Revlimid.  It is better when she is off Revlimid.  I told her it is because of Revlimid.      We discussed regarding changing treatment from Revlimid to something else.  The patient says that she can live with the shortness of breath.  She wants to continue on Revlimid and not change treatment. She will continue Revlimid 10 mg 2 weeks on and 1 week off.  She will continue with dexamethasone 4 mg once a week.        We will continue prophylactic acyclovir and aspirin.      2. She will get her Zometa today.  Labs are good for Zometa.      3. She has mild leukopenia and thrombocytopenia, which will be monitored.  Advised her to see a physician if she has fever, chills or infection or any other concerns.      4. I will see her in 2 months.  In between, she will see our nurse practitioner.  Advised her to call us if she has worsening shortness of breath or any other concerns.         YVES PEREZ MD             D: 10/07/2020   T: 10/07/2020   MT:       Name:     DUGLAS CASTILLO   MRN:      [id number]        Account:      [id number]   :      1935           Visit Date:   10/07/2020      Document: [id number]      This office note has been dictated.          Again, thank you for allowing me to participate in the care of your patient.        Sincerely,        Yves Perez MD

## 2020-10-07 NOTE — PROGRESS NOTES
Infusion Nursing Note:  Quiana Dunaway presents today for zometa and flu vaccine.    Patient seen by provider today: Yes: Dr. Parry   present during visit today: Not Applicable.    Note: N/A.    Intravenous Access:  Peripheral IV placed.    Treatment Conditions:  Lab Results   Component Value Date     10/05/2020                   Lab Results   Component Value Date    POTASSIUM 3.9 10/05/2020           Lab Results   Component Value Date    MAG 2.4 05/26/2020            Lab Results   Component Value Date    CR 0.66 10/05/2020                   Lab Results   Component Value Date    HUMBERTO 9.1 10/05/2020                Lab Results   Component Value Date    BILITOTAL 0.7 10/05/2020           Lab Results   Component Value Date    ALBUMIN 3.6 10/05/2020                    Lab Results   Component Value Date    ALT 28 10/05/2020           Lab Results   Component Value Date    AST 18 10/05/2020       Results reviewed, labs MET treatment parameters, ok to proceed with treatment.      Post Infusion Assessment:  Patient tolerated infusion without incident.  Site patent and intact, free from redness, edema or discomfort.  No evidence of extravasations.  Access discontinued per protocol.       Discharge Plan:   AVS to patient via [facility].  Patient will return as prev kirsty for next appointment.   Patient discharged in stable condition accompanied by: self.  Departure Mode: Ambulatory with cane.    Jimy French, RN

## 2020-10-08 LAB
ALBUMIN SERPL ELPH-MCNC: 4 G/DL (ref 3.7–5.1)
ALPHA1 GLOB SERPL ELPH-MCNC: 0.3 G/DL (ref 0.2–0.4)
ALPHA2 GLOB SERPL ELPH-MCNC: 0.8 G/DL (ref 0.5–0.9)
B-GLOBULIN SERPL ELPH-MCNC: 0.7 G/DL (ref 0.6–1)
GAMMA GLOB SERPL ELPH-MCNC: 0.9 G/DL (ref 0.7–1.6)
IGG SERPL-MCNC: 845 MG/DL (ref 610–1616)
KAPPA LC UR-MCNC: 19.54 MG/DL (ref 0.33–1.94)
KAPPA LC/LAMBDA SER: 44.41 {RATIO} (ref 0.26–1.65)
LAMBDA LC SERPL-MCNC: 0.44 MG/DL (ref 0.57–2.63)
M PROTEIN SERPL ELPH-MCNC: 0.6 G/DL
PROT PATTERN SERPL ELPH-IMP: ABNORMAL

## 2020-10-09 NOTE — RESULT ENCOUNTER NOTE
Dear Ms. Dunaway,    Blood tests reveal myeloma to be stable. This is good.    Please, call me with any questions.    Alex Parry MD

## 2020-10-10 DIAGNOSIS — C90.00 MULTIPLE MYELOMA NOT HAVING ACHIEVED REMISSION (H): Primary | ICD-10-CM

## 2020-10-12 NOTE — PROGRESS NOTES
Visit Date:   10/07/2020     HEMATOLOGY HISTORY: Mrs. Dunaway is a lady with multiple myeloma (IgG kappa). High risk, t(14;16).  1. On 05/01/2017, WBC of 3.4, hemoglobin of 10.2 and platelet of 154.  2. SPEP on 07/07/2017 revealed M-spike of 1.8.  3. Bone marrow biopsy on 07/12/2017 reveals kappa monotypic plasma cell population consistent with multiple myeloma.  Bone marrow is 70% cellular.  Plasma cell is 50-60%.     -There is gain of chromosome 1, 5, 9, 15, and 17.  There is translocation 14;16.   4.  On 07/18/2017:  -M-spike of 1.9.   -Immunofixation reveals monoclonal IgG kappa.    -IgG level of 2970.  IgA of 15 and IgM of 175.    -Kappa free light chain of 67.7.  Lambda free light chain of 1.42.  Ratio of kappa to lambda of 47.71.    -Beta 2 microglobulin of 3.4.   5. PET scan on 07/24/2017 reveals several focal areas of increased FDG uptake consistent with multiple myeloma.  There is probable epithelial cyst in tail of the pancreas.  No associated abnormal FDG activity.  Left thyroid cysts or nodules without any FDG activity.  There is a 0.3 cm left upper lobe lung nodule.   8.  Velcade, Revlimid and dexamethasone between on 08/14/2017 and 04/30/2018. Velcade stopped.   -Revlimid and dexamethasone continued.     SUBJECTIVE:  Ms. Dunaway is an 85-year-old female with multiple myeloma.  She is on Revlimid 10 mg a day, 2 weeks on and 1 week off.  She is on dexamethasone 4 mg once a week.      The patient has some side effects of Revlimid.  She has fatigue.  Also, she gets more short of breath when she is on Revlimid.  When she is off Revlimid, her shortness of breath is better.  She had multiple investigations done including lower extremity ultrasound and CT chest angiogram which were negative for thrombosis.  Echocardiogram does not reveal any cardiomyopathy or decreased ejection fraction.  She has been following up with a cardiologist. The patient says that overall she can live with his shortness of breath.       She has fatigue.  No headache.  No dizziness.  No chest pain.  No nausea or vomiting.  Appetite is fairly good.  No fever or chills.      All other review of systems negative.      PHYSICAL EXAMINATION:   GENERAL:  Alert and oriented x 3.   VITAL SIGNS:  Reviewed.  ECOG PS of 2.     EYES:  No icterus.   THROAT:  No ulcer. No thrush.   NECK:  Supple. No lymphadenopathy. No thyromegaly.   AXILLAE:  No lymphadenopathy.   LUNGS:  Good air entry bilaterally.  No crackles or wheezing.   HEART:  Regular.  No murmur.   GI: Abdomen is soft.  Nontender. No mass. Difficult palpation because of her weight.  EXTREMITIES:  No pedal edema.  No calf swelling or tenderness.   SKIN:  No rash.  -At angle of mouth on the left side, there is a small skin nodule.  Not ulcerated.  No bleeding from it.      LABORATORY DATA:  Reviewed.      ASSESSMENT:   1.  An 85-year-old female with high-risk multiple myeloma on Revlimid.    2.  Fatigue.   3.  Shortness of breath.  This is from Revlimid.   4.  Mild leukopenia from Revlimid.   5.  Mild thrombocytopenia from myeloma and Revlimid.      PLAN:   1. I discussed regarding her shortness of breath.  It mainly happens when she is on Revlimid.  It is better when she is off Revlimid.  I told her it is because of Revlimid.      We discussed regarding changing treatment from Revlimid to something else.  The patient says that she can live with the shortness of breath.  She wants to continue on Revlimid and not change treatment. She will continue Revlimid 10 mg 2 weeks on and 1 week off.  She will continue with dexamethasone 4 mg once a week.        We will continue prophylactic acyclovir and aspirin.      2. She will get her Zometa today.  Labs are good for Zometa.      3. She has mild leukopenia and thrombocytopenia, which will be monitored.  Advised her to see a physician if she has fever, chills or infection or any other concerns.      4. I will see her in 2 months.  In between, she will see our  nurse practitioner.  Advised her to call us if she has worsening shortness of breath or any other concerns.         YVES PEREZ MD             D: 10/07/2020   T: 10/07/2020   MT:       Name:     DUGLAS CASTILLO   MRN:      [id number]        Account:      [id number]   :      1935           Visit Date:   10/07/2020      Document: [id number]

## 2020-10-23 ENCOUNTER — TELEPHONE (OUTPATIENT)
Dept: PHARMACY | Facility: CLINIC | Age: 85
End: 2020-10-23

## 2020-10-23 DIAGNOSIS — C90.00 MULTIPLE MYELOMA NOT HAVING ACHIEVED REMISSION (H): Primary | ICD-10-CM

## 2020-10-23 RX ORDER — DEXAMETHASONE 4 MG/1
4 TABLET ORAL WEEKLY
Qty: 15 TABLET | Refills: 0 | Status: SHIPPED | OUTPATIENT
Start: 2020-10-23 | End: 2020-12-02

## 2020-10-23 RX ORDER — LENALIDOMIDE 10 MG/1
10 CAPSULE ORAL DAILY
Qty: 14 CAPSULE | Refills: 0 | Status: SHIPPED | OUTPATIENT
Start: 2020-10-23 | End: 2020-12-02

## 2020-10-23 NOTE — TELEPHONE ENCOUNTER
Oral Chemotherapy Monitoring Program   Medication: Revlimid  Rx: 10mg PO daily on days 1 through 14 of 21 day cycle   Auth #: [id number] obtained on 10/23/2020   Risk Category: adult female of non child bearing potential  Routine survey questions reviewed.   Rx [facility] be Escribed to [facility]    Terrie Bowen [facility] Oncology Pharmacy Liaison  [phone]

## 2020-10-26 ENCOUNTER — ANCILLARY PROCEDURE (OUTPATIENT)
Dept: CARDIOLOGY | Facility: CLINIC | Age: 85
End: 2020-10-26
Attending: INTERNAL MEDICINE
Payer: MEDICARE

## 2020-10-26 DIAGNOSIS — Z95.0 PACEMAKER: ICD-10-CM

## 2020-10-26 DIAGNOSIS — I49.5 SINOATRIAL NODE DYSFUNCTION (H): ICD-10-CM

## 2020-10-26 PROCEDURE — 93296 REM INTERROG EVL PM/IDS: CPT | Performed by: INTERNAL MEDICINE

## 2020-10-26 PROCEDURE — 93294 REM INTERROG EVL PM/LDLS PM: CPT | Performed by: INTERNAL MEDICINE

## 2020-10-27 ENCOUNTER — TELEPHONE (OUTPATIENT)
Dept: ONCOLOGY | Facility: CLINIC | Age: 85
End: 2020-10-27

## 2020-10-27 NOTE — TELEPHONE ENCOUNTER
Oral Chemotherapy Monitoring Program    Subjective/Objective:  Quiana Dunaway is a 85 year old female contacted by phone for a follow-up visit for oral chemotherapy.      ORAL CHEMOTHERAPY 9/18/2017 10/16/2017 5/7/2018 6/4/2018 7/9/2018 6/4/2019 10/27/2020   Assessment Type - - - - - - Quarterly Follow up   Diagnosis Code - - - - - - Multiple Myeloma   Providers - - - - - - Alex Parry   Clinic Name/Location - - - - - - [facility]   Drug Name Revlimid (Lenalidomide) Revlimid (Lenalidomide) Revlimid (Lenalidomide) Revlimid (Lenalidomide) Revlimid (Lenalidomide) Revlimid (Lenalidomide) Revlimid (Lenalidomide)   Dose - - - - - - 10 mg   Start Date of Last Cycle 16028 [id number] [phone] [id number]   Planned next cycle start date 02468 [id number]   Doses missed in last 2 weeks more 0 0 0 0 - 0   Adherence Assessment Adherent Adherent - Adherent Adherent - Adherent   Adverse Effects Fatigue Fatigue - No AE identified during assessment Fatigue - No AE identified during assessment   Fatigue - - - - (No Data) - -   Pharmacist Intervention(fatigue) - - - - Yes - -   Home BPs - - - Not applicable Not applicable - Not applicable   Any new drug interactions? No No No No No No No   Is the dose as ordered appropriate for the patient? No No Yes Yes Yes Yes Yes   Is the patient currently in pain? - - - Assessed in last 30 days. Assessed in last 30 days. Assessed in last 30 days. No   Has the patient been assessed within the past 6 months for depression? - - - Yes Yes Yes No   Has the patient missed any days of school, work, or other routine activity? - - - No No - No   Since the last time we talked, have you been hospitalized or used the emergency room? - - - - - - No       Last PHQ-2 Score on record:   PHQ-2 ( [address] [facility]) 7/15/2020 6/11/2020   Q1: Little interest or pleasure in doing things 0 0   Q2: Feeling down, depressed or hopeless 0 0   PHQ-2 Score 0 0       Vitals:  BP:   BP Readings from Last 1  "Encounters:   10/07/20 136/82     Wt Readings from Last 1 Encounters:   10/07/20 68 kg (150 lb)     Estimated body surface area is 1.72 meters squared as calculated from the following:    Height as of 10/7/20: 1.575 m (5' 2\").    Weight as of 10/7/20: 68 kg (150 lb).    Labs:  _  Result Component Current Result Ref Range   Sodium 141 (10/5/2020) 133 - 144 mmol/L     _  Result Component Current Result Ref Range   Potassium 3.9 (10/5/2020) 3.4 - 5.3 mmol/L     _  Result Component Current Result Ref Range   Calcium 9.1 (10/5/2020) 8.5 - 10.1 mg/dL     No results found for Mag within last 30 days.     No results found for Phos within last 30 days.     _  Result Component Current Result Ref Range   Albumin 3.6 (10/5/2020) 3.4 - 5.0 g/dL     _  Result Component Current Result Ref Range   Urea Nitrogen 20 (10/5/2020) 7 - 30 mg/dL     _  Result Component Current Result Ref Range   Creatinine 0.66 (10/5/2020) 0.52 - 1.04 mg/dL     _  Result Component Current Result Ref Range   AST 18 (10/5/2020) 0 - 45 U/L     _  Result Component Current Result Ref Range   ALT 28 (10/5/2020) 0 - 50 U/L     _  Result Component Current Result Ref Range   Bilirubin Total 0.7 (10/5/2020) 0.2 - 1.3 mg/dL     _  Result Component Current Result Ref Range   WBC 3.0 (L) (10/5/2020) 4.0 - 11.0 10e9/L     _  Result Component Current Result Ref Range   Hemoglobin 12.4 (10/5/2020) 11.7 - 15.7 g/dL     _  Result Component Current Result Ref Range   Platelet Count 144 (L) (10/5/2020) 150 - 450 10e9/L     _  Result Component Current Result Ref Range   Absolute Neutrophil 2.4 (10/5/2020) 1.6 - 8.3 10e9/L         Assessment/Plan:  Spoke to pt's daughter Regi for follow up. Patient continues to remain stable on Revlimid with no reports of new side effects, no changes in med list and no adherence issues. Next cycle start on 10/28.    Follow-Up:  11/10    Refill Due:  11/18    Rosy Sandy, Pharm D.  [facility]  [address] [place] [address]  Mail Order [phone]  Specialty [phone]

## 2020-10-31 LAB
MDC_IDC_EPISODE_DTM: NORMAL
MDC_IDC_EPISODE_DTM: NORMAL
MDC_IDC_EPISODE_DURATION: 183 S
MDC_IDC_EPISODE_DURATION: 7 S
MDC_IDC_EPISODE_ID: 32
MDC_IDC_EPISODE_ID: 33
MDC_IDC_EPISODE_TYPE: NORMAL
MDC_IDC_EPISODE_TYPE: NORMAL
MDC_IDC_LEAD_IMPLANT_DT: NORMAL
MDC_IDC_LEAD_IMPLANT_DT: NORMAL
MDC_IDC_LEAD_LOCATION: NORMAL
MDC_IDC_LEAD_LOCATION: NORMAL
MDC_IDC_LEAD_LOCATION_DETAIL_1: NORMAL
MDC_IDC_LEAD_LOCATION_DETAIL_1: NORMAL
MDC_IDC_LEAD_MFG: NORMAL
MDC_IDC_LEAD_MFG: NORMAL
MDC_IDC_LEAD_MODEL: NORMAL
MDC_IDC_LEAD_MODEL: NORMAL
MDC_IDC_LEAD_POLARITY_TYPE: NORMAL
MDC_IDC_LEAD_POLARITY_TYPE: NORMAL
MDC_IDC_LEAD_SERIAL: NORMAL
MDC_IDC_LEAD_SERIAL: NORMAL
MDC_IDC_MSMT_BATTERY_DTM: NORMAL
MDC_IDC_MSMT_BATTERY_REMAINING_LONGEVITY: 80 MO
MDC_IDC_MSMT_BATTERY_RRT_TRIGGER: 2.83
MDC_IDC_MSMT_BATTERY_STATUS: NORMAL
MDC_IDC_MSMT_BATTERY_VOLTAGE: 3.01 V
MDC_IDC_MSMT_LEADCHNL_RA_IMPEDANCE_VALUE: 342 OHM
MDC_IDC_MSMT_LEADCHNL_RA_IMPEDANCE_VALUE: 361 OHM
MDC_IDC_MSMT_LEADCHNL_RA_PACING_THRESHOLD_AMPLITUDE: 0.5 V
MDC_IDC_MSMT_LEADCHNL_RA_PACING_THRESHOLD_PULSEWIDTH: 0.4 MS
MDC_IDC_MSMT_LEADCHNL_RA_SENSING_INTR_AMPL: 3.12 MV
MDC_IDC_MSMT_LEADCHNL_RA_SENSING_INTR_AMPL: 3.12 MV
MDC_IDC_MSMT_LEADCHNL_RV_IMPEDANCE_VALUE: 380 OHM
MDC_IDC_MSMT_LEADCHNL_RV_IMPEDANCE_VALUE: 513 OHM
MDC_IDC_MSMT_LEADCHNL_RV_PACING_THRESHOLD_AMPLITUDE: 1.75 V
MDC_IDC_MSMT_LEADCHNL_RV_PACING_THRESHOLD_PULSEWIDTH: 0.4 MS
MDC_IDC_MSMT_LEADCHNL_RV_SENSING_INTR_AMPL: 3.38 MV
MDC_IDC_MSMT_LEADCHNL_RV_SENSING_INTR_AMPL: 3.38 MV
MDC_IDC_PG_IMPLANT_DTM: NORMAL
MDC_IDC_PG_MFG: NORMAL
MDC_IDC_PG_MODEL: NORMAL
MDC_IDC_PG_SERIAL: NORMAL
MDC_IDC_PG_TYPE: NORMAL
MDC_IDC_SESS_CLINIC_NAME: NORMAL
MDC_IDC_SESS_DTM: NORMAL
MDC_IDC_SESS_TYPE: NORMAL
MDC_IDC_SET_BRADY_AT_MODE_SWITCH_RATE: 171 {BEATS}/MIN
MDC_IDC_SET_BRADY_HYSTRATE: NORMAL
MDC_IDC_SET_BRADY_LOWRATE: 60 {BEATS}/MIN
MDC_IDC_SET_BRADY_MAX_SENSOR_RATE: 120 {BEATS}/MIN
MDC_IDC_SET_BRADY_MAX_TRACKING_RATE: 120 {BEATS}/MIN
MDC_IDC_SET_BRADY_MODE: NORMAL
MDC_IDC_SET_BRADY_PAV_DELAY_LOW: 300 MS
MDC_IDC_SET_BRADY_SAV_DELAY_LOW: 300 MS
MDC_IDC_SET_LEADCHNL_RA_PACING_AMPLITUDE: 1.5 V
MDC_IDC_SET_LEADCHNL_RA_PACING_ANODE_ELECTRODE_1: NORMAL
MDC_IDC_SET_LEADCHNL_RA_PACING_ANODE_LOCATION_1: NORMAL
MDC_IDC_SET_LEADCHNL_RA_PACING_CAPTURE_MODE: NORMAL
MDC_IDC_SET_LEADCHNL_RA_PACING_CATHODE_ELECTRODE_1: NORMAL
MDC_IDC_SET_LEADCHNL_RA_PACING_CATHODE_LOCATION_1: NORMAL
MDC_IDC_SET_LEADCHNL_RA_PACING_POLARITY: NORMAL
MDC_IDC_SET_LEADCHNL_RA_PACING_PULSEWIDTH: 0.4 MS
MDC_IDC_SET_LEADCHNL_RA_SENSING_ANODE_ELECTRODE_1: NORMAL
MDC_IDC_SET_LEADCHNL_RA_SENSING_ANODE_LOCATION_1: NORMAL
MDC_IDC_SET_LEADCHNL_RA_SENSING_CATHODE_ELECTRODE_1: NORMAL
MDC_IDC_SET_LEADCHNL_RA_SENSING_CATHODE_LOCATION_1: NORMAL
MDC_IDC_SET_LEADCHNL_RA_SENSING_POLARITY: NORMAL
MDC_IDC_SET_LEADCHNL_RA_SENSING_SENSITIVITY: 0.3 MV
MDC_IDC_SET_LEADCHNL_RV_PACING_AMPLITUDE: 3.75 V
MDC_IDC_SET_LEADCHNL_RV_PACING_ANODE_ELECTRODE_1: NORMAL
MDC_IDC_SET_LEADCHNL_RV_PACING_ANODE_LOCATION_1: NORMAL
MDC_IDC_SET_LEADCHNL_RV_PACING_CAPTURE_MODE: NORMAL
MDC_IDC_SET_LEADCHNL_RV_PACING_CATHODE_ELECTRODE_1: NORMAL
MDC_IDC_SET_LEADCHNL_RV_PACING_CATHODE_LOCATION_1: NORMAL
MDC_IDC_SET_LEADCHNL_RV_PACING_POLARITY: NORMAL
MDC_IDC_SET_LEADCHNL_RV_PACING_PULSEWIDTH: 0.4 MS
MDC_IDC_SET_LEADCHNL_RV_SENSING_ANODE_ELECTRODE_1: NORMAL
MDC_IDC_SET_LEADCHNL_RV_SENSING_ANODE_LOCATION_1: NORMAL
MDC_IDC_SET_LEADCHNL_RV_SENSING_CATHODE_ELECTRODE_1: NORMAL
MDC_IDC_SET_LEADCHNL_RV_SENSING_CATHODE_LOCATION_1: NORMAL
MDC_IDC_SET_LEADCHNL_RV_SENSING_POLARITY: NORMAL
MDC_IDC_SET_LEADCHNL_RV_SENSING_SENSITIVITY: 0.9 MV
MDC_IDC_SET_ZONE_DETECTION_INTERVAL: 350 MS
MDC_IDC_SET_ZONE_DETECTION_INTERVAL: 400 MS
MDC_IDC_SET_ZONE_TYPE: NORMAL
MDC_IDC_STAT_AT_BURDEN_PERCENT: 0 %
MDC_IDC_STAT_AT_DTM_END: NORMAL
MDC_IDC_STAT_AT_DTM_START: NORMAL
MDC_IDC_STAT_BRADY_AP_VP_PERCENT: 2 %
MDC_IDC_STAT_BRADY_AP_VS_PERCENT: 68.29 %
MDC_IDC_STAT_BRADY_AS_VP_PERCENT: 2.59 %
MDC_IDC_STAT_BRADY_AS_VS_PERCENT: 27.12 %
MDC_IDC_STAT_BRADY_DTM_END: NORMAL
MDC_IDC_STAT_BRADY_DTM_START: NORMAL
MDC_IDC_STAT_BRADY_RA_PERCENT_PACED: 70.11 %
MDC_IDC_STAT_BRADY_RV_PERCENT_PACED: 4.6 %
MDC_IDC_STAT_EPISODE_RECENT_COUNT: 0
MDC_IDC_STAT_EPISODE_RECENT_COUNT: 2
MDC_IDC_STAT_EPISODE_RECENT_COUNT_DTM_END: NORMAL
MDC_IDC_STAT_EPISODE_RECENT_COUNT_DTM_START: NORMAL
MDC_IDC_STAT_EPISODE_TOTAL_COUNT: 0
MDC_IDC_STAT_EPISODE_TOTAL_COUNT: 0
MDC_IDC_STAT_EPISODE_TOTAL_COUNT: 11
MDC_IDC_STAT_EPISODE_TOTAL_COUNT: 19
MDC_IDC_STAT_EPISODE_TOTAL_COUNT_DTM_END: NORMAL
MDC_IDC_STAT_EPISODE_TOTAL_COUNT_DTM_START: NORMAL
MDC_IDC_STAT_EPISODE_TYPE: NORMAL

## 2020-11-02 ENCOUNTER — HOSPITAL ENCOUNTER (OUTPATIENT)
Facility: CLINIC | Age: 85
Setting detail: SPECIMEN
End: 2020-11-02
Attending: INTERNAL MEDICINE
Payer: MEDICARE

## 2020-11-02 ENCOUNTER — DOCUMENTATION ONLY (OUTPATIENT)
Dept: PHARMACY | Facility: CLINIC | Age: 85
End: 2020-11-02

## 2020-11-02 ENCOUNTER — INFUSION THERAPY VISIT (OUTPATIENT)
Dept: INFUSION THERAPY | Facility: CLINIC | Age: 85
End: 2020-11-02
Attending: INTERNAL MEDICINE
Payer: MEDICARE

## 2020-11-02 DIAGNOSIS — C90.00 MULTIPLE MYELOMA NOT HAVING ACHIEVED REMISSION (H): Primary | ICD-10-CM

## 2020-11-02 LAB
ALBUMIN SERPL-MCNC: 3.5 G/DL (ref 3.4–5)
ALP SERPL-CCNC: 70 U/L (ref 40–150)
ALT SERPL W P-5'-P-CCNC: 27 U/L (ref 0–50)
ANION GAP SERPL CALCULATED.3IONS-SCNC: 7 MMOL/L (ref 3–14)
AST SERPL W P-5'-P-CCNC: 16 U/L (ref 0–45)
BASOPHILS # BLD AUTO: 0 10E9/L (ref 0–0.2)
BASOPHILS NFR BLD AUTO: 0.3 %
BILIRUB SERPL-MCNC: 0.6 MG/DL (ref 0.2–1.3)
BUN SERPL-MCNC: 23 MG/DL (ref 7–30)
CALCIUM SERPL-MCNC: 9.4 MG/DL (ref 8.5–10.1)
CHLORIDE SERPL-SCNC: 109 MMOL/L (ref 94–109)
CO2 SERPL-SCNC: 25 MMOL/L (ref 20–32)
CREAT SERPL-MCNC: 0.64 MG/DL (ref 0.52–1.04)
DIFFERENTIAL METHOD BLD: ABNORMAL
EOSINOPHIL # BLD AUTO: 0 10E9/L (ref 0–0.7)
EOSINOPHIL NFR BLD AUTO: 0.3 %
ERYTHROCYTE [DISTWIDTH] IN BLOOD BY AUTOMATED COUNT: 13.1 % (ref 10–15)
GFR SERPL CREATININE-BSD FRML MDRD: 82 ML/MIN/{1.73_M2}
GLUCOSE SERPL-MCNC: 153 MG/DL (ref 70–99)
HCT VFR BLD AUTO: 38.7 % (ref 35–47)
HGB BLD-MCNC: 13.1 G/DL (ref 11.7–15.7)
IMM GRANULOCYTES # BLD: 0 10E9/L (ref 0–0.4)
IMM GRANULOCYTES NFR BLD: 0.6 %
LYMPHOCYTES # BLD AUTO: 0.7 10E9/L (ref 0.8–5.3)
LYMPHOCYTES NFR BLD AUTO: 19.4 %
MCH RBC QN AUTO: 32.2 PG (ref 26.5–33)
MCHC RBC AUTO-ENTMCNC: 33.9 G/DL (ref 31.5–36.5)
MCV RBC AUTO: 95 FL (ref 78–100)
MONOCYTES # BLD AUTO: 0.1 10E9/L (ref 0–1.3)
MONOCYTES NFR BLD AUTO: 2.3 %
NEUTROPHILS # BLD AUTO: 2.7 10E9/L (ref 1.6–8.3)
NEUTROPHILS NFR BLD AUTO: 77.1 %
NRBC # BLD AUTO: 0 10*3/UL
NRBC BLD AUTO-RTO: 0 /100
PLATELET # BLD AUTO: 200 10E9/L (ref 150–450)
POTASSIUM SERPL-SCNC: 3.7 MMOL/L (ref 3.4–5.3)
PROT SERPL-MCNC: 7.2 G/DL (ref 6.8–8.8)
RBC # BLD AUTO: 4.07 10E12/L (ref 3.8–5.2)
SODIUM SERPL-SCNC: 141 MMOL/L (ref 133–144)
WBC # BLD AUTO: 3.5 10E9/L (ref 4–11)

## 2020-11-02 PROCEDURE — 80053 COMPREHEN METABOLIC PANEL: CPT | Performed by: INTERNAL MEDICINE

## 2020-11-02 PROCEDURE — 999N001036 HC STATISTIC TOTAL PROTEIN: Performed by: INTERNAL MEDICINE

## 2020-11-02 PROCEDURE — 85025 COMPLETE CBC W/AUTO DIFF WBC: CPT | Performed by: INTERNAL MEDICINE

## 2020-11-02 PROCEDURE — 36415 COLL VENOUS BLD VENIPUNCTURE: CPT

## 2020-11-02 PROCEDURE — 84165 PROTEIN E-PHORESIS SERUM: CPT | Mod: 26 | Performed by: PATHOLOGY

## 2020-11-02 PROCEDURE — 84165 PROTEIN E-PHORESIS SERUM: CPT | Mod: TC | Performed by: INTERNAL MEDICINE

## 2020-11-02 PROCEDURE — 83883 ASSAY NEPHELOMETRY NOT SPEC: CPT | Performed by: INTERNAL MEDICINE

## 2020-11-02 PROCEDURE — 99207 PR NO CHARGE LOS: CPT

## 2020-11-02 PROCEDURE — 82784 ASSAY IGA/IGD/IGG/IGM EACH: CPT | Performed by: INTERNAL MEDICINE

## 2020-11-02 NOTE — PROGRESS NOTES
Medical Assistant Note:  Quiana Dunaway presents today for Blood draw.    Patient seen by provider today: No.   present during visit today: Not Applicable.    Concerns: No Concerns.    Procedure:  Lab draw site: LAC, Needle type: BF, Gauge: 23.    Post Assessment:  Labs drawn without difficulty: Yes.    Discharge Plan:  Departure Mode: Ambulatory.    Face to Face Time: 5 Min.    Lauren Arredondo, CMA

## 2020-11-03 LAB
ALBUMIN SERPL ELPH-MCNC: 4.1 G/DL (ref 3.7–5.1)
ALPHA1 GLOB SERPL ELPH-MCNC: 0.4 G/DL (ref 0.2–0.4)
ALPHA2 GLOB SERPL ELPH-MCNC: 0.8 G/DL (ref 0.5–0.9)
B-GLOBULIN SERPL ELPH-MCNC: 0.7 G/DL (ref 0.6–1)
GAMMA GLOB SERPL ELPH-MCNC: 0.9 G/DL (ref 0.7–1.6)
IGG SERPL-MCNC: 922 MG/DL (ref 610–1616)
KAPPA LC UR-MCNC: 18.68 MG/DL (ref 0.33–1.94)
KAPPA LC/LAMBDA SER: 29.65 {RATIO} (ref 0.26–1.65)
LAMBDA LC SERPL-MCNC: 0.63 MG/DL (ref 0.57–2.63)
M PROTEIN SERPL ELPH-MCNC: 0.6 G/DL
PROT PATTERN SERPL ELPH-IMP: ABNORMAL

## 2020-11-03 NOTE — PROGRESS NOTES
Oral Chemotherapy Monitoring Program  Lab Follow Up    Reviewed lab results from 11/2/20.    ORAL CHEMOTHERAPY 9/18/2017 10/16/2017 5/7/2018 6/4/2018 7/9/2018 6/4/2019 10/27/2020   Assessment Type - - - - - - Quarterly Follow up   Diagnosis Code - - - - - - Multiple Myeloma   Providers - - - - - - Alex Parry   Clinic Name/Location - - - - - - [facility]   Drug Name Revlimid (Lenalidomide) Revlimid (Lenalidomide) Revlimid (Lenalidomide) Revlimid (Lenalidomide) Revlimid (Lenalidomide) Revlimid (Lenalidomide) Revlimid (Lenalidomide)   Dose - - - - - - 10 mg   Current Schedule Daily Daily Daily Daily Daily Daily Daily   Cycle Details 2 weeks on 1 week off 2 weeks on 1 week off 3 weeks on 1 week off 3 weeks on 1 week off 2 weeks on 1 week off 2 weeks on 1 week off 2 weeks on, 1 week off   Start Date of Last Cycle 9/5/2017 10/2/2017 5/7/2018 6/4/2018 7/1/2018 - 10/7/2020   Planned next cycle start date 10/2/2017 10/23/2017 6/4/2018 7/2/2018 7/22/2018 - 10/28/2020   Doses missed in last 2 weeks more 0 0 0 0 - 0   Adherence Assessment Adherent Adherent - Adherent Adherent - Adherent   Adverse Effects Fatigue Fatigue - No AE identified during assessment Fatigue - No AE identified during assessment   Fatigue - - - - (No Data) - -   Pharmacist Intervention(fatigue) - - - - Yes - -   Home BPs - - - Not applicable Not applicable - Not applicable   Any new drug interactions? No No No No No No No   Is the dose as ordered appropriate for the patient? No No Yes Yes Yes Yes Yes   Is the patient currently in pain? - - - Assessed in last 30 days. Assessed in last 30 days. Assessed in last 30 days. No   Has the patient been assessed within the past 6 months for depression? - - - Yes Yes Yes No   Has the patient missed any days of school, work, or other routine activity? - - - No No - No   Since the last time we talked, have you been hospitalized or used the emergency room? - - - - - - No       Labs:  _  Result Component Current  Result Ref Range   Sodium 141 (11/2/2020) 133 - 144 mmol/L     _  Result Component Current Result Ref Range   Potassium 3.7 (11/2/2020) 3.4 - 5.3 mmol/L     _  Result Component Current Result Ref Range   Calcium 9.4 (11/2/2020) 8.5 - 10.1 mg/dL     No results found for Mag within last 30 days.     No results found for Phos within last 30 days.     _  Result Component Current Result Ref Range   Albumin 3.5 (11/2/2020) 3.4 - 5.0 g/dL     _  Result Component Current Result Ref Range   Urea Nitrogen 23 (11/2/2020) 7 - 30 mg/dL     _  Result Component Current Result Ref Range   Creatinine 0.64 (11/2/2020) 0.52 - 1.04 mg/dL     _  Result Component Current Result Ref Range   AST 16 (11/2/2020) 0 - 45 U/L     _  Result Component Current Result Ref Range   ALT 27 (11/2/2020) 0 - 50 U/L     _  Result Component Current Result Ref Range   Bilirubin Total 0.6 (11/2/2020) 0.2 - 1.3 mg/dL     _  Result Component Current Result Ref Range   WBC 3.5 (L) (11/2/2020) 4.0 - 11.0 10e9/L     _  Result Component Current Result Ref Range   Hemoglobin 13.1 (11/2/2020) 11.7 - 15.7 g/dL     _  Result Component Current Result Ref Range   Platelet Count 200 (11/2/2020) 150 - 450 10e9/L     _  Result Component Current Result Ref Range   Absolute Neutrophil 2.7 (11/2/2020) 1.6 - 8.3 10e9/L       Assessment & Plan:  No concerning abnormalities.    Follow-Up:  4 weeks    Laci PEREZ PharmD.

## 2020-11-04 ENCOUNTER — ONCOLOGY VISIT (OUTPATIENT)
Dept: ONCOLOGY | Facility: CLINIC | Age: 85
End: 2020-11-04
Attending: INTERNAL MEDICINE
Payer: MEDICARE

## 2020-11-04 VITALS
TEMPERATURE: 98.6 F | WEIGHT: 148.4 LBS | RESPIRATION RATE: 16 BRPM | HEART RATE: 85 BPM | DIASTOLIC BLOOD PRESSURE: 64 MMHG | HEIGHT: 62 IN | BODY MASS INDEX: 27.31 KG/M2 | SYSTOLIC BLOOD PRESSURE: 111 MMHG | OXYGEN SATURATION: 96 %

## 2020-11-04 DIAGNOSIS — C90.00 MULTIPLE MYELOMA NOT HAVING ACHIEVED REMISSION (H): Primary | ICD-10-CM

## 2020-11-04 PROCEDURE — 99213 OFFICE O/P EST LOW 20 MIN: CPT | Performed by: NURSE PRACTITIONER

## 2020-11-04 PROCEDURE — G0463 HOSPITAL OUTPT CLINIC VISIT: HCPCS

## 2020-11-04 RX ORDER — CHOLECALCIFEROL (VITAMIN D3) 50 MCG
1 TABLET ORAL DAILY
COMMUNITY
End: 2022-10-06

## 2020-11-04 RX ORDER — ZINC GLUCONATE 50 MG
50 TABLET ORAL DAILY
COMMUNITY
End: 2022-06-28

## 2020-11-04 ASSESSMENT — PAIN SCALES - GENERAL: PAINLEVEL: NO PAIN (0)

## 2020-11-04 ASSESSMENT — MIFFLIN-ST. JEOR: SCORE: 1076.39

## 2020-11-04 NOTE — LETTER
"    11/4/2020         RE: Quiana Dunaway  [address] [facility] [address]        Dear Colleague,    Thank you for referring your patient, Quiana Dunaway, to the [facility] [place]. Please see a copy of my visit note below.    Oncology Rooming Note    November 4, 2020 1:43 PM   Quiana Dunaway is a 85 year old female who presents for:    Chief Complaint   Patient presents with     Oncology Clinic Visit     Initial Vitals: /64   Pulse 85   Temp 98.6  F (37  C) (Oral)   Resp 16   Ht 1.575 m (5' 2\")   Wt 67.3 kg (148 lb 6.4 oz)   SpO2 96%   BMI 27.14 kg/m   Estimated body mass index is 27.14 kg/m  as calculated from the following:    Height as of this encounter: 1.575 m (5' 2\").    Weight as of this encounter: 67.3 kg (148 lb 6.4 oz). Body surface area is 1.72 meters squared.  No Pain (0) Comment: Data Unavailable   No LMP recorded. Patient is postmenopausal.  Allergies reviewed: Yes  Medications reviewed: Yes    Medications: Medication refills not needed today.  Pharmacy name entered into [employer]: [facility] - [place], [place] - [address]    Clinical concerns: NP     Wonders if she should be taking vitamin C.      Lauren Arredondo, DYANA                Subjective  Overall reports feeling well.  Denies shortness of breath now.  Denies chest pain denies fever chills sweats       ROS: 14 point ROS neg other than the symptoms noted above in the HPI.        Physical Exam  Vitals signs reviewed.   HENT:      Head: Normocephalic.      Nose: Nose normal.   Eyes:      Pupils: Pupils are equal, round, and reactive to light.   Neck:      Musculoskeletal: Normal range of motion.   Cardiovascular:      Rate and Rhythm: Normal rate.      Pulses: Normal pulses.   Pulmonary:      Effort: Pulmonary effort is normal.   Abdominal:      General: Abdomen is flat.   Musculoskeletal: Normal range of motion.   Neurological:      Mental Status: She is alert. "           LABS  -Reviewed with patient    Assessment and plan    This is a 85-year-old female with    Multiple myeloma  -Labs reviewed stable multiple myeloma  -Continue with Revlimid 10 mg 2 weeks on 1 week off and dexamethasone 4 mg once a week  -Follow-up with Dr. Parry in December    Prophylaxis  Continue with aspirin and acyclovir    Bone health  Labs reviewed okay to give Zometa.  Patient continue with Zometa every 3 months  Next one due in January 2021        MARIUSZ Mckeon CNP        Again, thank you for allowing me to participate in the care of your patient.        Sincerely,        MARIUSZ Mckeon CNP

## 2020-11-04 NOTE — RESULT ENCOUNTER NOTE
Dear Ms. Dunaway,    Blood test reveals myeloma to be stable. This is good.    Please, call me with any questions.    Alex Parry MD

## 2020-11-04 NOTE — PROGRESS NOTES
Subjective  Overall reports feeling well.  Denies shortness of breath now.  Denies chest pain denies fever chills sweats       ROS: 14 point ROS neg other than the symptoms noted above in the HPI.        Physical Exam  Vitals signs reviewed.   HENT:      Head: Normocephalic.      Nose: Nose normal.   Eyes:      Pupils: Pupils are equal, round, and reactive to light.   Neck:      Musculoskeletal: Normal range of motion.   Cardiovascular:      Rate and Rhythm: Normal rate.      Pulses: Normal pulses.   Pulmonary:      Effort: Pulmonary effort is normal.   Abdominal:      General: Abdomen is flat.   Musculoskeletal: Normal range of motion.   Neurological:      Mental Status: She is alert.           LABS  -Reviewed with patient    Assessment and plan    This is a 85-year-old female with    Multiple myeloma  -Labs reviewed stable multiple myeloma  -Continue with Revlimid 10 mg 2 weeks on 1 week off and dexamethasone 4 mg once a week  -Follow-up with Dr. Parry in December    Prophylaxis  Continue with aspirin and acyclovir    Bone health  Labs reviewed okay to give Zometa.  Patient continue with Zometa every 3 months  Next one due in January 2021        MARIUSZ Mckeon CNP

## 2020-11-04 NOTE — PROGRESS NOTES
"Oncology Rooming Note    November 4, 2020 1:43 PM   Quiana Dunaway is a 85 year old female who presents for:    Chief Complaint   Patient presents with     Oncology Clinic Visit     Initial Vitals: /64   Pulse 85   Temp 98.6  F (37  C) (Oral)   Resp 16   Ht 1.575 m (5' 2\")   Wt 67.3 kg (148 lb 6.4 oz)   SpO2 96%   BMI 27.14 kg/m   Estimated body mass index is 27.14 kg/m  as calculated from the following:    Height as of this encounter: 1.575 m (5' 2\").    Weight as of this encounter: 67.3 kg (148 lb 6.4 oz). Body surface area is 1.72 meters squared.  No Pain (0) Comment: Data Unavailable   No LMP recorded. Patient is postmenopausal.  Allergies reviewed: Yes  Medications reviewed: Yes    Medications: Medication refills not needed today.  Pharmacy name entered into [employer]: [facility] - [place], [place] - [address]    Clinical concerns: NP     Wonders if she should be taking vitamin C.      Lauren Arredondo, [facility]            "

## 2020-11-11 DIAGNOSIS — C90.00 MULTIPLE MYELOMA NOT HAVING ACHIEVED REMISSION (H): Primary | ICD-10-CM

## 2020-11-11 RX ORDER — DEXAMETHASONE 4 MG/1
4 TABLET ORAL WEEKLY
Qty: 15 TABLET | Refills: 0 | Status: SHIPPED | OUTPATIENT
Start: 2020-11-11 | End: 2020-12-02

## 2020-11-11 RX ORDER — LENALIDOMIDE 10 MG/1
10 CAPSULE ORAL DAILY
Qty: 14 CAPSULE | Refills: 0 | Status: SHIPPED | OUTPATIENT
Start: 2020-11-11 | End: 2020-12-02

## 2020-11-12 ENCOUNTER — TELEPHONE (OUTPATIENT)
Dept: PHARMACY | Facility: CLINIC | Age: 85
End: 2020-11-12

## 2020-11-13 NOTE — TELEPHONE ENCOUNTER
Oral Chemotherapy Monitoring Program   Medication: Revlimid  Rx: 10mg PO daily on days 1 through 14 of 21 day cycle   Auth #:   [id number]  Obtained on: 11/12/2020   Risk Category: adult female of non child bearing potential  Routine survey questions reviewed.   Rx to be Escribed to [facility]    Terrie Bowen [facility] Oncology Pharmacy Liaison  [phone]

## 2020-11-30 ENCOUNTER — HOSPITAL ENCOUNTER (OUTPATIENT)
Facility: CLINIC | Age: 85
Setting detail: SPECIMEN
Discharge: HOME | End: 2020-11-30
Attending: INTERNAL MEDICINE | Admitting: INTERNAL MEDICINE
Payer: MEDICARE

## 2020-11-30 ENCOUNTER — INFUSION THERAPY VISIT (OUTPATIENT)
Dept: INFUSION THERAPY | Facility: CLINIC | Age: 85
End: 2020-11-30
Attending: INTERNAL MEDICINE
Payer: MEDICARE

## 2020-11-30 DIAGNOSIS — C90.00 MULTIPLE MYELOMA NOT HAVING ACHIEVED REMISSION (H): ICD-10-CM

## 2020-11-30 LAB
ALBUMIN SERPL-MCNC: 3.5 G/DL (ref 3.4–5)
ALP SERPL-CCNC: 76 U/L (ref 40–150)
ALT SERPL W P-5'-P-CCNC: 31 U/L (ref 0–50)
ANION GAP SERPL CALCULATED.3IONS-SCNC: 7 MMOL/L (ref 3–14)
AST SERPL W P-5'-P-CCNC: 15 U/L (ref 0–45)
BASOPHILS # BLD AUTO: 0 10E9/L (ref 0–0.2)
BASOPHILS NFR BLD AUTO: 0.3 %
BILIRUB SERPL-MCNC: 0.5 MG/DL (ref 0.2–1.3)
BUN SERPL-MCNC: 19 MG/DL (ref 7–30)
CALCIUM SERPL-MCNC: 9.2 MG/DL (ref 8.5–10.1)
CHLORIDE SERPL-SCNC: 106 MMOL/L (ref 94–109)
CO2 SERPL-SCNC: 28 MMOL/L (ref 20–32)
CREAT SERPL-MCNC: 0.95 MG/DL (ref 0.52–1.04)
DIFFERENTIAL METHOD BLD: NORMAL
EOSINOPHIL # BLD AUTO: 0 10E9/L (ref 0–0.7)
EOSINOPHIL NFR BLD AUTO: 0.3 %
ERYTHROCYTE [DISTWIDTH] IN BLOOD BY AUTOMATED COUNT: 13 % (ref 10–15)
GFR SERPL CREATININE-BSD FRML MDRD: 55 ML/MIN/{1.73_M2}
GLUCOSE SERPL-MCNC: 149 MG/DL (ref 70–99)
HCT VFR BLD AUTO: 38.6 % (ref 35–47)
HGB BLD-MCNC: 12.9 G/DL (ref 11.7–15.7)
IMM GRANULOCYTES # BLD: 0 10E9/L (ref 0–0.4)
IMM GRANULOCYTES NFR BLD: 0.5 %
LYMPHOCYTES # BLD AUTO: 0.8 10E9/L (ref 0.8–5.3)
LYMPHOCYTES NFR BLD AUTO: 20 %
MCH RBC QN AUTO: 31.7 PG (ref 26.5–33)
MCHC RBC AUTO-ENTMCNC: 33.4 G/DL (ref 31.5–36.5)
MCV RBC AUTO: 95 FL (ref 78–100)
MONOCYTES # BLD AUTO: 0.1 10E9/L (ref 0–1.3)
MONOCYTES NFR BLD AUTO: 2 %
NEUTROPHILS # BLD AUTO: 3 10E9/L (ref 1.6–8.3)
NEUTROPHILS NFR BLD AUTO: 76.9 %
NRBC # BLD AUTO: 0 10*3/UL
NRBC BLD AUTO-RTO: 0 /100
PLATELET # BLD AUTO: 222 10E9/L (ref 150–450)
POTASSIUM SERPL-SCNC: 3.7 MMOL/L (ref 3.4–5.3)
PROT SERPL-MCNC: 7.1 G/DL (ref 6.8–8.8)
RBC # BLD AUTO: 4.07 10E12/L (ref 3.8–5.2)
SODIUM SERPL-SCNC: 141 MMOL/L (ref 133–144)
WBC # BLD AUTO: 4 10E9/L (ref 4–11)

## 2020-11-30 PROCEDURE — 36415 COLL VENOUS BLD VENIPUNCTURE: CPT

## 2020-11-30 PROCEDURE — 80053 COMPREHEN METABOLIC PANEL: CPT | Performed by: INTERNAL MEDICINE

## 2020-11-30 PROCEDURE — 85025 COMPLETE CBC W/AUTO DIFF WBC: CPT | Performed by: INTERNAL MEDICINE

## 2020-12-01 DIAGNOSIS — C90.00 MULTIPLE MYELOMA NOT HAVING ACHIEVED REMISSION (H): Primary | ICD-10-CM

## 2020-12-02 ENCOUNTER — ONCOLOGY VISIT (OUTPATIENT)
Dept: ONCOLOGY | Facility: CLINIC | Age: 85
End: 2020-12-02
Attending: INTERNAL MEDICINE
Payer: MEDICARE

## 2020-12-02 ENCOUNTER — DOCUMENTATION ONLY (OUTPATIENT)
Dept: PHARMACY | Facility: CLINIC | Age: 85
End: 2020-12-02

## 2020-12-02 VITALS
OXYGEN SATURATION: 98 % | WEIGHT: 147.4 LBS | DIASTOLIC BLOOD PRESSURE: 76 MMHG | SYSTOLIC BLOOD PRESSURE: 107 MMHG | BODY MASS INDEX: 27.12 KG/M2 | HEART RATE: 86 BPM | HEIGHT: 62 IN | TEMPERATURE: 98.2 F | RESPIRATION RATE: 16 BRPM

## 2020-12-02 DIAGNOSIS — C90.00 MULTIPLE MYELOMA NOT HAVING ACHIEVED REMISSION (H): Primary | ICD-10-CM

## 2020-12-02 PROCEDURE — G0463 HOSPITAL OUTPT CLINIC VISIT: HCPCS

## 2020-12-02 PROCEDURE — 99213 OFFICE O/P EST LOW 20 MIN: CPT | Performed by: INTERNAL MEDICINE

## 2020-12-02 RX ORDER — ZOLEDRONIC ACID 0.04 MG/ML
4 INJECTION, SOLUTION INTRAVENOUS ONCE
Status: CANCELLED | OUTPATIENT
Start: 2020-12-30 | End: 2020-12-30

## 2020-12-02 ASSESSMENT — PAIN SCALES - GENERAL: PAINLEVEL: NO PAIN (0)

## 2020-12-02 ASSESSMENT — MIFFLIN-ST. JEOR: SCORE: 1071.85

## 2020-12-02 NOTE — LETTER
12/2/2020         RE: Quiana Dunaway  [address] [facility] [address]        Dear Colleague,    Thank you for referring your patient, Quiana Dunaway, to the [facility] [place]. Please see a copy of my visit note below.    Visit Date:   12/02/2020     HEMATOLOGY HISTORY: Mrs. Dunaway is a lady with multiple myeloma (IgG kappa). High risk, t(14;16).  1. On 05/01/2017, WBC of 3.4, hemoglobin of 10.2 and platelet of 154.  2. SPEP on 07/07/2017 revealed M-spike of 1.8.  3. Bone marrow biopsy on 07/12/2017 reveals kappa monotypic plasma cell population consistent with multiple myeloma.  Bone marrow is 70% cellular.  Plasma cell is 50-60%.     -There is gain of chromosome 1, 5, 9, 15, and 17.  There is translocation 14;16.   4.  On 07/18/2017:  -M-spike of 1.9.   -Immunofixation reveals monoclonal IgG kappa.    -IgG level of 2970.  IgA of 15 and IgM of 175.    -Kappa free light chain of 67.7.  Lambda free light chain of 1.42.  Ratio of kappa to lambda of 47.71.    -Beta 2 microglobulin of 3.4.   5. PET scan on 07/24/2017 reveals several focal areas of increased FDG uptake consistent with multiple myeloma.  There is probable epithelial cyst in tail of the pancreas.  No associated abnormal FDG activity.  Left thyroid cysts or nodules without any FDG activity.  There is a 0.3 cm left upper lobe lung nodule.   8.  Velcade, Revlimid and dexamethasone between on 08/14/2017 and 04/30/2018. Velcade stopped.   -Revlimid and dexamethasone continued.     SUBJECTIVE:  Ms. Dunaway is an 85-year-old female with high-risk multiple myeloma.  She is on Revlimid and dexamethasone.  Dexamethasone has been reduced to 4 mg once a week.  Revlimid is 10 mg for 14 days on and 7 days off.  She is tolerating it better.      REVIEW OF SYSTEMS:   The patient says that she is doing good.  No headache.  No dizziness.  No neck pain.  No chest pain.  No shortness of breath.  No abdominal pain, nausea or vomiting.   Appetite is good.  No urinary or bowel complaints.  No abnormal bleeding.  No fever, chills or night sweats.      PHYSICAL EXAMINATION:   GENERAL:  She is alert, oriented x 3.   VITAL SIGNS:  Reviewed.  She is not in any distress.   The rest of systems not examined.      LABORATORY DATA:  Reviewed.      ASSESSMENT:   1.  An 85-year-old female with high-risk multiple myeloma on Revlimid and dexamethasone.  She is in partial remission.   2.  Mild fatigue secondary to her age.      PLAN:   1.  The patient is doing well.  She is pretty asymptomatic except mild fatigue, which would go along with her age.   2.  For myeloma, she will continue on Revlimid and dexamethasone.  She is tolerating it well.   3.  She will continue to get Zometa every 3 months.   4.  I will see her in 3 months.  In between, she will see our nurse practitioner.  Advised her to call us with any questions or concerns.         YVES PEREZ MD             D: 2020   T: 2020   MT: EMANUEL      Name:     DUGLAS CASTILLO   MRN:      [id number]        Account:      [id number]   :      1935           Visit Date:   2020      Document: [id number]      This office note has been dictated.          Again, thank you for allowing me to participate in the care of your patient.        Sincerely,        Yves Perez MD

## 2020-12-02 NOTE — PROGRESS NOTES
Oral Chemotherapy Monitoring Program  Lab Follow Up    Reviewed lab results from 11/30/20.    ORAL CHEMOTHERAPY 10/16/2017 5/7/2018 6/4/2018 7/9/2018 6/4/2019 10/27/2020 12/2/2020   Assessment Type - - - - - Quarterly Follow up Lab Monitoring   Diagnosis Code - - - - - Multiple Myeloma Multiple Myeloma   Providers - - - - - Alex Parry   Clinic Name/Location - - - - - [facility]   Drug Name Revlimid (Lenalidomide) Revlimid (Lenalidomide) Revlimid (Lenalidomide) Revlimid (Lenalidomide) Revlimid (Lenalidomide) Revlimid (Lenalidomide) Revlimid (Lenalidomide)   Dose - - - - - 10 mg 10 mg   Current Schedule Daily Daily Daily Daily Daily Daily Daily   Cycle Details 2 weeks on 1 week off 3 weeks on 1 week off 3 weeks on 1 week off 2 weeks on 1 week off 2 weeks on 1 week off 2 weeks on, 1 week off 2 weeks on, 1 week off   Start Date of Last Cycle 10/2/2017 5/7/2018 6/4/2018 7/1/2018 - 10/7/2020 -   Planned next cycle start date 10/23/2017 6/4/2018 7/2/2018 7/22/2018 - 10/28/2020 -   Doses missed in last 2 weeks 0 0 0 0 - 0 -   Adherence Assessment Adherent - Adherent Adherent - Adherent -   Adverse Effects Fatigue - No AE identified during assessment Fatigue - No AE identified during assessment -   Fatigue - - - (No Data) - - -   Pharmacist Intervention(fatigue) - - - Yes - - -   Home BPs - - Not applicable Not applicable - Not applicable -   Any new drug interactions? No No No No No No -   Is the dose as ordered appropriate for the patient? No Yes Yes Yes Yes Yes -   Is the patient currently in pain? - - Assessed in last 30 days. Assessed in last 30 days. Assessed in last 30 days. No -   Has the patient been assessed within the past 6 months for depression? - - Yes Yes Yes No -   Has the patient missed any days of school, work, or other routine activity? - - No No - No -   Since the last time we talked, have you been hospitalized or used the emergency room? - - - - - No -       Labs:  _  Result Component  Current Result Ref Range   Sodium 141 (11/30/2020) 133 - 144 mmol/L     _  Result Component Current Result Ref Range   Potassium 3.7 (11/30/2020) 3.4 - 5.3 mmol/L     _  Result Component Current Result Ref Range   Calcium 9.2 (11/30/2020) 8.5 - 10.1 mg/dL     No results found for Mag within last 30 days.     No results found for Phos within last 30 days.     _  Result Component Current Result Ref Range   Albumin 3.5 (11/30/2020) 3.4 - 5.0 g/dL     _  Result Component Current Result Ref Range   Urea Nitrogen 19 (11/30/2020) 7 - 30 mg/dL     _  Result Component Current Result Ref Range   Creatinine 0.95 (11/30/2020) 0.52 - 1.04 mg/dL     _  Result Component Current Result Ref Range   AST 15 (11/30/2020) 0 - 45 U/L     _  Result Component Current Result Ref Range   ALT 31 (11/30/2020) 0 - 50 U/L     _  Result Component Current Result Ref Range   Bilirubin Total 0.5 (11/30/2020) 0.2 - 1.3 mg/dL     _  Result Component Current Result Ref Range   WBC 4.0 (11/30/2020) 4.0 - 11.0 10e9/L     _  Result Component Current Result Ref Range   Hemoglobin 12.9 (11/30/2020) 11.7 - 15.7 g/dL     _  Result Component Current Result Ref Range   Platelet Count 222 (11/30/2020) 150 - 450 10e9/L     _  Result Component Current Result Ref Range   Absolute Neutrophil 3.0 (11/30/2020) 1.6 - 8.3 10e9/L       Assessment & Plan:  No concerning abnormalities. Ok to proceed with revlimid and dex. Dr. Parry ok with doing labs monthly.    Questions answered to patient's satisfaction.    Follow-Up:  4 weeks with labs and zometa.     Debra Ortega PharmD  December 2, 2020

## 2020-12-02 NOTE — PATIENT INSTRUCTIONS
1. Continue revlimid and dexamethasone.  2. Continue aspirin and acyclovir.  3. See Juaquin Hammond in a month with labs.  -Zometa on same day.  4. See me in 3 months with labs.

## 2020-12-03 ENCOUNTER — TELEPHONE (OUTPATIENT)
Dept: PHARMACY | Facility: CLINIC | Age: 85
End: 2020-12-03

## 2020-12-03 RX ORDER — DEXAMETHASONE 4 MG/1
4 TABLET ORAL WEEKLY
Qty: 15 TABLET | Refills: 0 | Status: SHIPPED | OUTPATIENT
Start: 2020-12-03 | End: 2021-01-14

## 2020-12-03 RX ORDER — LENALIDOMIDE 10 MG/1
10 CAPSULE ORAL DAILY
Qty: 14 CAPSULE | Refills: 0 | Status: SHIPPED | OUTPATIENT
Start: 2020-12-03 | End: 2021-01-14

## 2020-12-03 NOTE — TELEPHONE ENCOUNTER
Oral Chemotherapy Monitoring Program   Medication: Revlimid  Rx: 10mg PO daily on days 1 through 14 of 21 day cycle   Auth #: [id number]  Obtained on: 12/3/2020  Risk Category: adult female of non child bearing potential  Routine survey questions reviewed.   Rx to be Escribed to [facility]    Terrie Bowen [facility] Oncology Pharmacy Liaison  [phone]

## 2020-12-05 NOTE — PROGRESS NOTES
Visit Date:   12/02/2020     HEMATOLOGY HISTORY: Mrs. Dunaway is a lady with multiple myeloma (IgG kappa). High risk, t(14;16).  1. On 05/01/2017, WBC of 3.4, hemoglobin of 10.2 and platelet of 154.  2. SPEP on 07/07/2017 revealed M-spike of 1.8.  3. Bone marrow biopsy on 07/12/2017 reveals kappa monotypic plasma cell population consistent with multiple myeloma.  Bone marrow is 70% cellular.  Plasma cell is 50-60%.     -There is gain of chromosome 1, 5, 9, 15, and 17.  There is translocation 14;16.   4.  On 07/18/2017:  -M-spike of 1.9.   -Immunofixation reveals monoclonal IgG kappa.    -IgG level of 2970.  IgA of 15 and IgM of 175.    -Kappa free light chain of 67.7.  Lambda free light chain of 1.42.  Ratio of kappa to lambda of 47.71.    -Beta 2 microglobulin of 3.4.   5. PET scan on 07/24/2017 reveals several focal areas of increased FDG uptake consistent with multiple myeloma.  There is probable epithelial cyst in tail of the pancreas.  No associated abnormal FDG activity.  Left thyroid cysts or nodules without any FDG activity.  There is a 0.3 cm left upper lobe lung nodule.   8.  Velcade, Revlimid and dexamethasone between on 08/14/2017 and 04/30/2018. Velcade stopped.   -Revlimid and dexamethasone continued.     SUBJECTIVE:  Ms. Dunaway is an 85-year-old female with high-risk multiple myeloma.  She is on Revlimid and dexamethasone.  Dexamethasone has been reduced to 4 mg once a week.  Revlimid is 10 mg for 14 days on and 7 days off.  She is tolerating it better.      REVIEW OF SYSTEMS:   The patient says that she is doing good.  No headache.  No dizziness.  No neck pain.  No chest pain.  No shortness of breath.  No abdominal pain, nausea or vomiting.  Appetite is good.  No urinary or bowel complaints.  No abnormal bleeding.  No fever, chills or night sweats.      PHYSICAL EXAMINATION:   GENERAL:  She is alert, oriented x 3.   VITAL SIGNS:  Reviewed.  She is not in any distress.   The rest of systems not  examined.      LABORATORY DATA:  Reviewed.      ASSESSMENT:   1.  An 85-year-old female with high-risk multiple myeloma on Revlimid and dexamethasone.  She is in partial remission.   2.  Mild fatigue secondary to her age.      PLAN:   1.  The patient is doing well.  She is pretty asymptomatic except mild fatigue, which would go along with her age.   2.  For myeloma, she will continue on Revlimid and dexamethasone.  She is tolerating it well.   3.  She will continue to get Zometa every 3 months.   4.  I will see her in 3 months.  In between, she will see our nurse practitioner.  Advised her to call us with any questions or concerns.         YVES PEREZ MD             D: 2020   T: 2020   MT: EMANUEL      Name:     DUGLAS CASTILLO   MRN:      [id number]        Account:      [id number]   :      1935           Visit Date:   2020      Document: [id number]

## 2020-12-24 ENCOUNTER — TELEPHONE (OUTPATIENT)
Dept: PHARMACY | Facility: CLINIC | Age: 85
End: 2020-12-24

## 2020-12-24 DIAGNOSIS — C90.00 MULTIPLE MYELOMA NOT HAVING ACHIEVED REMISSION (H): Primary | ICD-10-CM

## 2020-12-24 RX ORDER — DEXAMETHASONE 4 MG/1
4 TABLET ORAL WEEKLY
Qty: 15 TABLET | Refills: 0 | Status: SHIPPED | OUTPATIENT
Start: 2020-12-24 | End: 2021-03-16

## 2020-12-24 RX ORDER — LENALIDOMIDE 10 MG/1
10 CAPSULE ORAL DAILY
Qty: 14 CAPSULE | Refills: 0 | Status: SHIPPED | OUTPATIENT
Start: 2020-12-24 | End: 2021-03-16

## 2020-12-24 NOTE — TELEPHONE ENCOUNTER
Oral Chemotherapy Monitoring Program   Medication: Revlimid  Rx: 10mg PO daily on days 1 through 14 of 21 day cycle   Auth #: [id number]   Obtained on: 12/24/2020   Risk Category: adult female of non childbearing potential  Routine survey questions reviewed.   Rx [facility] be Escribed to [facility]    Terrie Bowen [facility] Oncology Pharmacy Liaison  [phone]

## 2020-12-29 ENCOUNTER — VIRTUAL VISIT (OUTPATIENT)
Dept: UROLOGY | Facility: CLINIC | Age: 85
End: 2020-12-29
Payer: MEDICARE

## 2020-12-29 VITALS — BODY MASS INDEX: 26.68 KG/M2 | HEIGHT: 62 IN | WEIGHT: 145 LBS

## 2020-12-29 DIAGNOSIS — R35.0 URINARY FREQUENCY: Primary | ICD-10-CM

## 2020-12-29 PROCEDURE — 99213 OFFICE O/P EST LOW 20 MIN: CPT | Mod: 95 | Performed by: PHYSICIAN ASSISTANT

## 2020-12-29 RX ORDER — ESTRADIOL 0.1 MG/G
CREAM VAGINAL
Qty: 42.5 G | Refills: 3 | Status: SHIPPED | OUTPATIENT
Start: 2020-12-29 | End: 2022-06-28

## 2020-12-29 RX ORDER — MIRABEGRON 50 MG/1
50 TABLET, EXTENDED RELEASE ORAL DAILY
Qty: 90 TABLET | Refills: 4 | Status: SHIPPED | OUTPATIENT
Start: 2020-12-29 | End: 2022-04-11

## 2020-12-29 ASSESSMENT — MIFFLIN-ST. JEOR: SCORE: 1060.97

## 2020-12-29 ASSESSMENT — PAIN SCALES - GENERAL: PAINLEVEL: NO PAIN (0)

## 2020-12-29 NOTE — PROGRESS NOTES
"Quiana Dunaway is a 85 year old female who is being evaluated via a billable video visit.  _________________________________  Pt has day and nt freq  Every 2 hours at nt  Pt denies dysuria.  Pt denies gross hem.  Pt drinks pop and iced tea during the day...  Sometimes water.  --------------------------------------------------------      The patient has been notified of following:     \"This video visit will be conducted via a call between you and your physician/provider. We have found that certain health care needs can be provided without the need for an in-person physical exam.  This service lets us provide the care you need with a video conversation.  If a prescription is necessary we can send it directly to your pharmacy.  If lab work is needed we can place an order for that and you can then stop by our lab to have the test done at a later time.    Video visits are billed at different rates depending on your insurance coverage.  Please reach out to your insurance provider with any questions.    If during the course of the call the physician/provider feels a video visit is not appropriate, you will not be charged for this service.\"    Patient has given verbal consent for Video visit? Yes  How would you like to obtain your AVS? MyChart  If you are dropped from the video visit, the video invite should be resent to: Text to cell phone: [phone]  Will anyone else be joining your video visit? Kateryna Pro CMA    Video-Visit Details    Type of service:  Video Visit    Video Start Time: 11:09 AM  Video End Time: 11:18 AM    Originating Location (pt. Location): Home    Distant Location (provider location):  [facility] UROLOGY [facility] [place]     Platform used for Video Visit: Saundra        HPI: It is a pleasure to see Quiana Dunaway, a pleasant 85 year old female seen in follow up for the above. This problem had been going on for several years. No symptoms  during the day, had nocturia q1-2 hours and " rarely wet. The patient does not wear protective pads. Rare leakage with coughing, sneezing, bending at the waist.  Started on Detrol and noted huge improvement in symptoms. Was changed to Myrbetriq 25mg due to insurance coverage issues.      Now with worsening control since 2018. Nocturia q 2 hours.     Denies any dysuria, gross hematuria, pyuria, sensation of incomplete bladder emptying, needing to strain or Crede to void, history of recent urinary tract infections or kidney stones. The patient does not have any neurologic issues. Denies constipation. Fluid consumption includes soda and iced tea.  Hx of MM. Now on lasix.       Past Medical History:   Diagnosis Date     Anxiety      HTN (hypertension)      Multiple myeloma not having achieved remission (H) 7/18/2017     Pacemaker     Placed due to symptomatic second degree AV Block     Pancytopenia (H) 6/28/2017     Paroxysmal atrial fibrillation (H)      Current Outpatient Medications   Medication     acyclovir (ZOVIRAX) 400 MG tablet     aspirin 81 MG chewable tablet     dexamethasone (DECADRON) 4 MG tablet     dexamethasone (DECADRON) 4 MG tablet     dexamethasone (DECADRON) 4 MG tablet     estradiol (ESTRACE VAGINAL) 0.1 MG/GM vaginal cream     furosemide (LASIX) 20 MG tablet     LENalidomide (REVLIMID) 10 MG CAPS capsule     lisinopril (ZESTRIL) 2.5 MG tablet     Loperamide HCl (IMODIUM A-D PO)     LORazepam (ATIVAN) 0.5 MG tablet     mirabegron (MYRBETRIQ) 50 MG 24 hr tablet     MYRBETRIQ 25 MG 24 hr tablet     nitroglycerin (NITROSTAT) 0.4 MG sublingual tablet     potassium chloride ER (KLOR-CON M) 10 MEQ CR tablet     prochlorperazine (COMPAZINE) 10 MG tablet     vitamin B complex with vitamin C (VITAMIN  B COMPLEX) tablet     vitamin D3 (CHOLECALCIFEROL) 50 mcg (2000 units) tablet     zinc gluconate 50 MG tablet     Zoledronic Acid (ZOMETA IV)     LENalidomide (REVLIMID) 10 MG CAPS capsule     LENalidomide (REVLIMID) 10 MG CAPS capsule     No current  facility-administered medications for this visit.         Allergies   Allergen Reactions     Levaquin [Levofloxacin] Other (See Comments)     Tingling in feet after 1 dose on 8/7/19       ROS: 14-pt ROS neg other than that noted in the HPI.  PE  PSYCH: NAD  EYES: EOMI  MOUTH: MMM  NECK: Supple, no notable adenopathy  RESP: Unlabored breathing  NEURO: AAO x3    A/P: Urgency, frequency  -Increase Myrbetriq to 50 mg  -Eliminate irritants  -Estrogen cream three times a week near urethra (pea-sized amount): If >$40, she will let me know and we can get via a compound pharmacy.  Follow-up if not improving.     Leona Rodriguez PA-C  [facility] Urology

## 2020-12-29 NOTE — PATIENT INSTRUCTIONS
Estrogen cream three times a week near urethra (pea-sized amount): If >$40, let me know and we can get via a compound pharmacy.    Increase Myrbetriq to 50mg.    Let me know if you are not improving after 3 months!

## 2020-12-29 NOTE — LETTER
"12/29/2020       RE: Quiana Dunaway  [address] [facility] [address]     Dear Colleague,    Thank you for referring your patient, Quiana Dunaway, to the [facility] UROLOGY CL[facility] [place] at [facility]. Please see a copy of my visit note below.    Quiana Dunaway is a 85 year old female who is being evaluated via a billable video visit.  _________________________________  Pt has day and nt freq  Every 2 hours at nt  Pt denies dysuria.  Pt denies gross hem.  Pt drinks pop and iced tea during the day...  Sometimes water.  --------------------------------------------------------      The patient has been notified of following:     \"This video visit will be conducted via a call between you and your physician/provider. We have found that certain health care needs can be provided without the need for an in-person physical exam.  This service lets us provide the care you need with a video conversation.  If a prescription is necessary we can send it directly to your pharmacy.  If lab work is needed we can place an order for that and you can then stop by our lab to have the test done at a later time.    Video visits are billed at different rates depending on your insurance coverage.  Please reach out to your insurance provider with any questions.    If during the course of the call the physician/provider feels a video visit is not appropriate, you will not be charged for this service.\"    Patient has given verbal consent for Video visit? Yes  How would you like to obtain your AVS? MyChart  If you are dropped from the video visit, the video invite should be resent to: Text to cell phone: [phone]  Will anyone else be joining your video visit? No      THADDEUS Pro CMA    Video-Visit Details    Type of service:  Video Visit    Video Start Time: 11:09 AM  Video End Time: 11:18 AM    Originating Location (pt. Location): Home    Distant Location (provider location):  " [facility] UROLOGY CLINIC [place]     Platform used for Video Visit: Saundra        HPI: It is a pleasure to see Quiana Dunaway, a pleasant 85 year old female seen in follow up for the above. This problem had been going on for several years. No symptoms  during the day, had nocturia q1-2 hours and rarely wet. The patient does not wear protective pads. Rare leakage with coughing, sneezing, bending at the waist.  Started on Detrol and noted huge improvement in symptoms. Was changed to Myrbetriq 25mg due to insurance coverage issues.      Now with worsening control since 2018. Nocturia q 2 hours.     Denies any dysuria, gross hematuria, pyuria, sensation of incomplete bladder emptying, needing to strain or Crede to void, history of recent urinary tract infections or kidney stones. The patient does not have any neurologic issues. Denies constipation. Fluid consumption includes soda and iced tea.  Hx of MM. Now on lasix.       Past Medical History:   Diagnosis Date     Anxiety      HTN (hypertension)      Multiple myeloma not having achieved remission (H) 7/18/2017     Pacemaker     Placed due to symptomatic second degree AV Block     Pancytopenia (H) 6/28/2017     Paroxysmal atrial fibrillation (H)      Current Outpatient Medications   Medication     acyclovir (ZOVIRAX) 400 MG tablet     aspirin 81 MG chewable tablet     dexamethasone (DECADRON) 4 MG tablet     dexamethasone (DECADRON) 4 MG tablet     dexamethasone (DECADRON) 4 MG tablet     estradiol (ESTRACE VAGINAL) 0.1 MG/GM vaginal cream     furosemide (LASIX) 20 MG tablet     LENalidomide (REVLIMID) 10 MG CAPS capsule     lisinopril (ZESTRIL) 2.5 MG tablet     Loperamide HCl (IMODIUM A-D PO)     LORazepam (ATIVAN) 0.5 MG tablet     mirabegron (MYRBETRIQ) 50 MG 24 hr tablet     MYRBETRIQ 25 MG 24 hr tablet     nitroglycerin (NITROSTAT) 0.4 MG sublingual tablet     potassium chloride ER (KLOR-CON M) 10 MEQ CR tablet     prochlorperazine (COMPAZINE) 10 MG  tablet     vitamin B complex with vitamin C (VITAMIN  B COMPLEX) tablet     vitamin D3 (CHOLECALCIFEROL) 50 mcg (2000 units) tablet     zinc gluconate 50 MG tablet     Zoledronic Acid (ZOMETA IV)     LENalidomide (REVLIMID) 10 MG CAPS capsule     LENalidomide (REVLIMID) 10 MG CAPS capsule     No current facility-administered medications for this visit.         Allergies   Allergen Reactions     Levaquin [Levofloxacin] Other (See Comments)     Tingling in feet after 1 dose on 8/7/19       ROS: 14-pt ROS neg other than that noted in the HPI.  PE  PSYCH: NAD  EYES: EOMI  MOUTH: MMM  NECK: Supple, no notable adenopathy  RESP: Unlabored breathing  NEURO: AAO x3    A/P: Urgency, frequency  -Increase Myrbetriq to 50 mg  -Eliminate irritants  -Estrogen cream three times a week near urethra (pea-sized amount): If >$40, she will let me know and we can get via a compound pharmacy.  Follow-up if not improving.     Leona Rodriguez PA-C  [facility] Urology

## 2020-12-30 ENCOUNTER — HOSPITAL ENCOUNTER (OUTPATIENT)
Facility: CLINIC | Age: 85
Setting detail: SPECIMEN
Discharge: HOME | End: 2020-12-30
Attending: NURSE PRACTITIONER | Admitting: INTERNAL MEDICINE
Payer: MEDICARE

## 2020-12-30 ENCOUNTER — INFUSION THERAPY VISIT (OUTPATIENT)
Dept: INFUSION THERAPY | Facility: CLINIC | Age: 85
End: 2020-12-30
Attending: NURSE PRACTITIONER
Payer: MEDICARE

## 2020-12-30 ENCOUNTER — DOCUMENTATION ONLY (OUTPATIENT)
Dept: ONCOLOGY | Facility: CLINIC | Age: 85
End: 2020-12-30

## 2020-12-30 ENCOUNTER — ONCOLOGY VISIT (OUTPATIENT)
Dept: ONCOLOGY | Facility: CLINIC | Age: 85
End: 2020-12-30
Attending: NURSE PRACTITIONER
Payer: MEDICARE

## 2020-12-30 VITALS
HEIGHT: 62 IN | WEIGHT: 145 LBS | HEART RATE: 83 BPM | TEMPERATURE: 99.1 F | OXYGEN SATURATION: 96 % | RESPIRATION RATE: 16 BRPM | SYSTOLIC BLOOD PRESSURE: 117 MMHG | BODY MASS INDEX: 26.68 KG/M2 | DIASTOLIC BLOOD PRESSURE: 79 MMHG

## 2020-12-30 DIAGNOSIS — C90.00 MULTIPLE MYELOMA NOT HAVING ACHIEVED REMISSION (H): ICD-10-CM

## 2020-12-30 DIAGNOSIS — C90.00 MULTIPLE MYELOMA NOT HAVING ACHIEVED REMISSION (H): Primary | ICD-10-CM

## 2020-12-30 LAB
ALBUMIN SERPL-MCNC: 3.5 G/DL (ref 3.4–5)
ALP SERPL-CCNC: 70 U/L (ref 40–150)
ALT SERPL W P-5'-P-CCNC: 22 U/L (ref 0–50)
ANION GAP SERPL CALCULATED.3IONS-SCNC: 4 MMOL/L (ref 3–14)
AST SERPL W P-5'-P-CCNC: 12 U/L (ref 0–45)
BASOPHILS # BLD AUTO: 0 10E9/L (ref 0–0.2)
BASOPHILS NFR BLD AUTO: 1.1 %
BILIRUB SERPL-MCNC: 0.7 MG/DL (ref 0.2–1.3)
BUN SERPL-MCNC: 25 MG/DL (ref 7–30)
CALCIUM SERPL-MCNC: 9.2 MG/DL (ref 8.5–10.1)
CHLORIDE SERPL-SCNC: 105 MMOL/L (ref 94–109)
CO2 SERPL-SCNC: 32 MMOL/L (ref 20–32)
CREAT SERPL-MCNC: 0.71 MG/DL (ref 0.52–1.04)
DIFFERENTIAL METHOD BLD: ABNORMAL
EOSINOPHIL # BLD AUTO: 0.1 10E9/L (ref 0–0.7)
EOSINOPHIL NFR BLD AUTO: 2.2 %
ERYTHROCYTE [DISTWIDTH] IN BLOOD BY AUTOMATED COUNT: 12.8 % (ref 10–15)
GFR SERPL CREATININE-BSD FRML MDRD: 78 ML/MIN/{1.73_M2}
GLUCOSE SERPL-MCNC: 80 MG/DL (ref 70–99)
HCT VFR BLD AUTO: 37.6 % (ref 35–47)
HGB BLD-MCNC: 12.4 G/DL (ref 11.7–15.7)
IMM GRANULOCYTES # BLD: 0 10E9/L (ref 0–0.4)
IMM GRANULOCYTES NFR BLD: 0.3 %
LYMPHOCYTES # BLD AUTO: 1.6 10E9/L (ref 0.8–5.3)
LYMPHOCYTES NFR BLD AUTO: 44.2 %
MCH RBC QN AUTO: 31.3 PG (ref 26.5–33)
MCHC RBC AUTO-ENTMCNC: 33 G/DL (ref 31.5–36.5)
MCV RBC AUTO: 95 FL (ref 78–100)
MONOCYTES # BLD AUTO: 0.6 10E9/L (ref 0–1.3)
MONOCYTES NFR BLD AUTO: 17.1 %
NEUTROPHILS # BLD AUTO: 1.3 10E9/L (ref 1.6–8.3)
NEUTROPHILS NFR BLD AUTO: 35.1 %
NRBC # BLD AUTO: 0 10*3/UL
NRBC BLD AUTO-RTO: 0 /100
PLATELET # BLD AUTO: 124 10E9/L (ref 150–450)
POTASSIUM SERPL-SCNC: 3.5 MMOL/L (ref 3.4–5.3)
PROT SERPL-MCNC: 6.8 G/DL (ref 6.8–8.8)
RBC # BLD AUTO: 3.96 10E12/L (ref 3.8–5.2)
SODIUM SERPL-SCNC: 141 MMOL/L (ref 133–144)
WBC # BLD AUTO: 3.6 10E9/L (ref 4–11)

## 2020-12-30 PROCEDURE — 96365 THER/PROPH/DIAG IV INF INIT: CPT

## 2020-12-30 PROCEDURE — 250N000011 HC RX IP 250 OP 636: Performed by: INTERNAL MEDICINE

## 2020-12-30 PROCEDURE — 85025 COMPLETE CBC W/AUTO DIFF WBC: CPT | Performed by: INTERNAL MEDICINE

## 2020-12-30 PROCEDURE — 80053 COMPREHEN METABOLIC PANEL: CPT | Performed by: INTERNAL MEDICINE

## 2020-12-30 PROCEDURE — 36415 COLL VENOUS BLD VENIPUNCTURE: CPT

## 2020-12-30 PROCEDURE — 258N000003 HC RX IP 258 OP 636: Performed by: INTERNAL MEDICINE

## 2020-12-30 PROCEDURE — G0463 HOSPITAL OUTPT CLINIC VISIT: HCPCS | Mod: 25

## 2020-12-30 PROCEDURE — 99214 OFFICE O/P EST MOD 30 MIN: CPT | Performed by: NURSE PRACTITIONER

## 2020-12-30 RX ADMIN — SODIUM CHLORIDE 250 ML: 9 INJECTION, SOLUTION INTRAVENOUS at 12:40

## 2020-12-30 RX ADMIN — ZOLEDRONIC ACID 3.5 MG: 4 INJECTION, SOLUTION, CONCENTRATE INTRAVENOUS at 12:41

## 2020-12-30 ASSESSMENT — MIFFLIN-ST. JEOR: SCORE: 1060.97

## 2020-12-30 ASSESSMENT — PAIN SCALES - GENERAL: PAINLEVEL: NO PAIN (0)

## 2020-12-30 NOTE — PROGRESS NOTES
"Oncology Rooming Note    December 30, 2020 11:56 AM   Quiana Dunaway is a 85 year old female who presents for:    Chief Complaint   Patient presents with     Oncology Clinic Visit     Initial Vitals: /79   Pulse 83   Temp 99.1  F (37.3  C)   Resp 16   Ht 1.575 m (5' 2\")   Wt 65.8 kg (145 lb)   SpO2 96%   BMI 26.52 kg/m   Estimated body mass index is 26.52 kg/m  as calculated from the following:    Height as of this encounter: 1.575 m (5' 2\").    Weight as of this encounter: 65.8 kg (145 lb). Body surface area is 1.7 meters squared.  No Pain (0) Comment: Data Unavailable   No LMP recorded. Patient is postmenopausal.  Allergies reviewed: Yes  Medications reviewed: Yes    Medications: Medication refills not needed today.  Pharmacy name entered into [employer]: [facility] - [place], [place] - [address]    Clinical concerns:  NP was notified.      Jacquie Mcdaniel MA            "

## 2020-12-30 NOTE — PROGRESS NOTES
Infusion Nursing Note:  Quiana B Emelyn presents today for zometa.    Patient seen by provider today: Yes: TIN Reza   present during visit today: Not Applicable.    Note: N/A.    Intravenous Access:  Peripheral IV placed.    Treatment Conditions:  Lab Results   Component Value Date     12/30/2020                   Lab Results   Component Value Date    POTASSIUM 3.5 12/30/2020           Lab Results   Component Value Date    MAG 2.4 05/26/2020            Lab Results   Component Value Date    CR 0.71 12/30/2020                   Lab Results   Component Value Date    HUMBERTO 9.2 12/30/2020                Lab Results   Component Value Date    BILITOTAL 0.7 12/30/2020           Lab Results   Component Value Date    ALBUMIN 3.5 12/30/2020                    Lab Results   Component Value Date    ALT 22 12/30/2020           Lab Results   Component Value Date    AST 12 12/30/2020       Results reviewed, labs MET treatment parameters, ok to proceed with treatment.      Post Infusion Assessment:  Patient tolerated infusion without incident.  Blood return noted pre and post infusion.  Site patent and intact, free from redness, edema or discomfort.  No evidence of extravasations.  Access discontinued per protocol.       Discharge Plan:   Discharge instructions reviewed with: Patient.  Patient and/or family verbalized understanding of discharge instructions and all questions answered.  AVS to patient via [email or web address].     Patient discharged in stable condition accompanied by: self.  Departure Mode: Ambulatory w/cane.    Twyla Duncan RN

## 2020-12-30 NOTE — LETTER
12/30/2020         RE: Quiana Dunaway  [address] [facility] [address]        Dear Colleague,    Thank you for referring your patient, Quiana Dunaway, to the [facility]. Please see a copy of my visit note below.    Medical Assistant Note:  Quiana Dunaway presents today for blood draw.    Patient seen by provider today: Yes: marco a.   present during visit today: Not Applicable.    Concerns: No Concerns.    Procedure:  Lab draw site: lac, Needle type: bf, Gauge: 23.    Post Assessment:  Labs drawn without difficulty: Yes.    Discharge Plan:  Departure Mode: Ambulatory.    Face to Face Time: 5 min  .    Mariela Lyn [facility]              Again, thank you for allowing me to participate in the care of your patient.        Sincerely,         Lab Draw

## 2020-12-30 NOTE — PROGRESS NOTES
Oral Chemotherapy Monitoring Program  Lab Follow Up    Patient currently on lanalidomide/dex therapy for RRMM.    Assessment & Plan:  Patient is mildly neutropenic at ANC 1300. Jerod Reza will hold lenalidomide for a week and recheck cbc before restarting.   Questions answered to patient's satisfaction.    Follow-Up:  One week with labs (1/6 at 1330)    Labs:  _  Result Component Current Result Ref Range   Sodium 141 (12/30/2020) 133 - 144 mmol/L     _  Result Component Current Result Ref Range   Potassium 3.5 (12/30/2020) 3.4 - 5.3 mmol/L     _  Result Component Current Result Ref Range   Calcium 9.2 (12/30/2020) 8.5 - 10.1 mg/dL     No results found for Mag within last 30 days.     No results found for Phos within last 30 days.     _  Result Component Current Result Ref Range   Albumin 3.5 (12/30/2020) 3.4 - 5.0 g/dL     _  Result Component Current Result Ref Range   Urea Nitrogen 25 (12/30/2020) 7 - 30 mg/dL     _  Result Component Current Result Ref Range   Creatinine 0.71 (12/30/2020) 0.52 - 1.04 mg/dL       _  Result Component Current Result Ref Range   AST 12 (12/30/2020) 0 - 45 U/L     _  Result Component Current Result Ref Range   ALT 22 (12/30/2020) 0 - 50 U/L     _  Result Component Current Result Ref Range   Bilirubin Total 0.7 (12/30/2020) 0.2 - 1.3 mg/dL       _  Result Component Current Result Ref Range   WBC 3.6 (L) (12/30/2020) 4.0 - 11.0 10e9/L     _  Result Component Current Result Ref Range   Hemoglobin 12.4 (12/30/2020) 11.7 - 15.7 g/dL     _  Result Component Current Result Ref Range   Platelet Count 124 (L) (12/30/2020) 150 - 450 10e9/L     _  Result Component Current Result Ref Range   Absolute Neutrophil 1.3 (L) (12/30/2020) 1.6 - 8.3 10e9/L         Yohan Leal, PharmD,SHELBY  Hematology/Oncology Pharmacist  [facility]  December 30, 2020

## 2020-12-30 NOTE — PROGRESS NOTES
Medical Assistant Note:  Quiana Dunaway presents today for blood draw.    Patient seen by provider today: Yes: marco a.   present during visit today: Not Applicable.    Concerns: No Concerns.    Procedure:  Lab draw site: lac, Needle type: bf, Gauge: 23.    Post Assessment:  Labs drawn without difficulty: Yes.    Discharge Plan:  Departure Mode: Ambulatory.    Face to Face Time: 5 min  .    Mariela Lyn, CMA

## 2020-12-30 NOTE — LETTER
"    12/30/2020         RE: Quiana Dunaway  [address] [facility] [address]        Dear Colleague,    Thank you for referring your patient, Quiana Dunaway, to the [facility] [place]. Please see a copy of my visit note below.    Oncology Rooming Note    December 30, 2020 11:56 AM   Quiana Dunaway is a 85 year old female who presents for:    Chief Complaint   Patient presents with     Oncology Clinic Visit     Initial Vitals: /79   Pulse 83   Temp 99.1  F (37.3  C)   Resp 16   Ht 1.575 m (5' 2\")   Wt 65.8 kg (145 lb)   SpO2 96%   BMI 26.52 kg/m   Estimated body mass index is 26.52 kg/m  as calculated from the following:    Height as of this encounter: 1.575 m (5' 2\").    Weight as of this encounter: 65.8 kg (145 lb). Body surface area is 1.7 meters squared.  No Pain (0) Comment: Data Unavailable   No LMP recorded. Patient is postmenopausal.  Allergies reviewed: Yes  Medications reviewed: Yes    Medications: Medication refills not needed today.  Pharmacy name entered into [employer]: [facility] - [place], [place] - [address]    Clinical concerns:  NP was notified.      Jacquie Mcdaniel MA              Oncology/Hematology Visit Note  Dec 30, 2020    Reason for Visit: follow up of multiple myeloma    Patient is currently taking Revlimid 10 mg 2 weeks on 1 week off  Dexamethasone 4 mg once a week  And Zometa every 3 months    Interval History:  Patient reports overall she is feeling well.  Fever chills sweats cough shortness of breath chest pain    Review of Systems:  14 point ROS of systems including Constitutional, Eyes, Respiratory, Cardiovascular, Gastroenterology, Genitourinary, Integumentary, Muscularskeletal, Psychiatric were all negative except for pertinent positives noted in my HPI.    Physical Examination:  General: The patient is a pleasant female in no acute distress.  /79   Pulse 83   Temp 99.1  F (37.3  C)   Resp 16   Ht 1.575 " "m (5' 2\")   Wt 65.8 kg (145 lb)   SpO2 96%   BMI 26.52 kg/m    HEENT: EOMI, PERRL. Sclerae are anicteric. Oral mucosa is pink and moist with no lesions or thrush.   Lymph: Neck is supple with no lymphadenopathy in the cervical or supraclavicular areas.   Heart: Regular rate and rhythm.   Lungs: Clear to auscultation bilaterally.   GI: Bowel sounds present, soft, nontender with no palpable hepatosplenomegaly or masses.   Extremities: No lower extremity edema noted bilaterally.   Skin: No rashes, petechiae, or bruising noted on exposed skin.    Laboratory Data:  Results for orders placed or performed in visit on 12/30/20 (from the past 24 hour(s))   Comprehensive metabolic panel   Result Value Ref Range    Sodium 141 133 - 144 mmol/L    Potassium 3.5 3.4 - 5.3 mmol/L    Chloride 105 94 - 109 mmol/L    Carbon Dioxide 32 20 - 32 mmol/L    Anion Gap 4 3 - 14 mmol/L    Glucose 80 70 - 99 mg/dL    Urea Nitrogen 25 7 - 30 mg/dL    Creatinine 0.71 0.52 - 1.04 mg/dL    GFR Estimate 78 >60 mL/min/[1.73_m2]    GFR Estimate If Black >90 >60 mL/min/[1.73_m2]    Calcium 9.2 8.5 - 10.1 mg/dL    Bilirubin Total 0.7 0.2 - 1.3 mg/dL    Albumin 3.5 3.4 - 5.0 g/dL    Protein Total 6.8 6.8 - 8.8 g/dL    Alkaline Phosphatase 70 40 - 150 U/L    ALT 22 0 - 50 U/L    AST 12 0 - 45 U/L   CBC with platelets differential   Result Value Ref Range    WBC 3.6 (L) 4.0 - 11.0 10e9/L    RBC Count 3.96 3.8 - 5.2 10e12/L    Hemoglobin 12.4 11.7 - 15.7 g/dL    Hematocrit 37.6 35.0 - 47.0 %    MCV 95 78 - 100 fl    MCH 31.3 26.5 - 33.0 pg    MCHC 33.0 31.5 - 36.5 g/dL    RDW 12.8 10.0 - 15.0 %    Platelet Count 124 (L) 150 - 450 10e9/L    Diff Method Automated Method     % Neutrophils 35.1 %    % Lymphocytes 44.2 %    % Monocytes 17.1 %    % Eosinophils 2.2 %    % Basophils 1.1 %    % Immature Granulocytes 0.3 %    Nucleated RBCs 0 0 /100    Absolute Neutrophil 1.3 (L) 1.6 - 8.3 10e9/L    Absolute Lymphocytes 1.6 0.8 - 5.3 10e9/L    Absolute Monocytes " 0.6 0.0 - 1.3 10e9/L    Absolute Eosinophils 0.1 0.0 - 0.7 10e9/L    Absolute Basophils 0.0 0.0 - 0.2 10e9/L    Abs Immature Granulocytes 0.0 0 - 0.4 10e9/L    Absolute Nucleated RBC 0.0          Assessment and Plan:      This is a 85-year-old female with     multiple myeloma  Currently getting treatment with Revlimid 10 mg p.o. 14 days on 7 days off dexamethasone 4 mg a week  Labs reviewed with patient ANC is 1.3 today-this is after a week off Revlimid last week.  Patient is at risk for infection therefore, hold Revlimid this week  We will check CBC next week and restart Revlimid n  next week if ANC is back to normal  -We will also check multiple myeloma labs next week  Check cbc next week       Bone health  Patient has been tolerating Zometa well continue with Zometa every 3 months    Neutropenia  ANC is 1.3   Patient is asymptomatic.  Neutropenic precautions discussed  Check temperature frequently  Go to ER in the event fever chills sweats or any signs symptoms  As above hold Revlimid this week  Recheck CBC next week        MARIUSZ Mckeon CNP  [facility]     Chart documentation with Dragon Voice recognition Software. Although reviewed after completion, some words and grammatical errors may remain.            Again, thank you for allowing me to participate in the care of your patient.        Sincerely,        MARIUSZ Mckeon CNP

## 2020-12-30 NOTE — PROGRESS NOTES
"Oncology/Hematology Visit Note  Dec 30, 2020    Reason for Visit: follow up of multiple myeloma    Patient is currently taking Revlimid 10 mg 2 weeks on 1 week off  Dexamethasone 4 mg once a week  And Zometa every 3 months    Interval History:  Patient reports overall she is feeling well.  Fever chills sweats cough shortness of breath chest pain    Review of Systems:  14 point ROS of systems including Constitutional, Eyes, Respiratory, Cardiovascular, Gastroenterology, Genitourinary, Integumentary, Muscularskeletal, Psychiatric were all negative except for pertinent positives noted in my HPI.    Physical Examination:  General: The patient is a pleasant female in no acute distress.  /79   Pulse 83   Temp 99.1  F (37.3  C)   Resp 16   Ht 1.575 m (5' 2\")   Wt 65.8 kg (145 lb)   SpO2 96%   BMI 26.52 kg/m    HEENT: EOMI, PERRL. Sclerae are anicteric. Oral mucosa is pink and moist with no lesions or thrush.   Lymph: Neck is supple with no lymphadenopathy in the cervical or supraclavicular areas.   Heart: Regular rate and rhythm.   Lungs: Clear to auscultation bilaterally.   GI: Bowel sounds present, soft, nontender with no palpable hepatosplenomegaly or masses.   Extremities: No lower extremity edema noted bilaterally.   Skin: No rashes, petechiae, or bruising noted on exposed skin.    Laboratory Data:  Results for orders placed or performed in visit on 12/30/20 (from the past 24 hour(s))   Comprehensive metabolic panel   Result Value Ref Range    Sodium 141 133 - 144 mmol/L    Potassium 3.5 3.4 - 5.3 mmol/L    Chloride 105 94 - 109 mmol/L    Carbon Dioxide 32 20 - 32 mmol/L    Anion Gap 4 3 - 14 mmol/L    Glucose 80 70 - 99 mg/dL    Urea Nitrogen 25 7 - 30 mg/dL    Creatinine 0.71 0.52 - 1.04 mg/dL    GFR Estimate 78 >60 mL/min/[1.73_m2]    GFR Estimate If Black >90 >60 mL/min/[1.73_m2]    Calcium 9.2 8.5 - 10.1 mg/dL    Bilirubin Total 0.7 0.2 - 1.3 mg/dL    Albumin 3.5 3.4 - 5.0 g/dL    Protein Total 6.8 " 6.8 - 8.8 g/dL    Alkaline Phosphatase 70 40 - 150 U/L    ALT 22 0 - 50 U/L    AST 12 0 - 45 U/L   CBC with platelets differential   Result Value Ref Range    WBC 3.6 (L) 4.0 - 11.0 10e9/L    RBC Count 3.96 3.8 - 5.2 10e12/L    Hemoglobin 12.4 11.7 - 15.7 g/dL    Hematocrit 37.6 35.0 - 47.0 %    MCV 95 78 - 100 fl    MCH 31.3 26.5 - 33.0 pg    MCHC 33.0 31.5 - 36.5 g/dL    RDW 12.8 10.0 - 15.0 %    Platelet Count 124 (L) 150 - 450 10e9/L    Diff Method Automated Method     % Neutrophils 35.1 %    % Lymphocytes 44.2 %    % Monocytes 17.1 %    % Eosinophils 2.2 %    % Basophils 1.1 %    % Immature Granulocytes 0.3 %    Nucleated RBCs 0 0 /100    Absolute Neutrophil 1.3 (L) 1.6 - 8.3 10e9/L    Absolute Lymphocytes 1.6 0.8 - 5.3 10e9/L    Absolute Monocytes 0.6 0.0 - 1.3 10e9/L    Absolute Eosinophils 0.1 0.0 - 0.7 10e9/L    Absolute Basophils 0.0 0.0 - 0.2 10e9/L    Abs Immature Granulocytes 0.0 0 - 0.4 10e9/L    Absolute Nucleated RBC 0.0          Assessment and Plan:      This is a 85-year-old female with     multiple myeloma  Currently getting treatment with Revlimid 10 mg p.o. 14 days on 7 days off dexamethasone 4 mg a week  Labs reviewed with patient ANC is 1.3 today-this is after a week off Revlimid last week.  Patient is at risk for infection therefore, hold Revlimid this week  We will check CBC next week and restart Revlimid n  next week if ANC is back to normal  -We will also check multiple myeloma labs next week  Check cbc next week       Bone health  Patient has been tolerating Zometa well continue with Zometa every 3 months    Neutropenia  ANC is 1.3   Patient is asymptomatic.  Neutropenic precautions discussed  Check temperature frequently  Go to ER in the event fever chills sweats or any signs symptoms  As above hold Revlimid this week  Recheck CBC next week        Juaquin Klak, APRN CNP  [facility]     Chart documentation with Dragon Voice recognition Software. Although  reviewed after completion, some words and grammatical errors may remain.

## 2021-01-06 ENCOUNTER — DOCUMENTATION ONLY (OUTPATIENT)
Dept: PHARMACY | Facility: CLINIC | Age: 86
End: 2021-01-06

## 2021-01-06 ENCOUNTER — HOSPITAL ENCOUNTER (OUTPATIENT)
Facility: CLINIC | Age: 86
Setting detail: SPECIMEN
Discharge: HOME | End: 2021-01-06
Attending: INTERNAL MEDICINE | Admitting: INTERNAL MEDICINE
Payer: MEDICARE

## 2021-01-06 ENCOUNTER — OFFICE VISIT (OUTPATIENT)
Dept: INFUSION THERAPY | Facility: CLINIC | Age: 86
End: 2021-01-06
Attending: INTERNAL MEDICINE
Payer: MEDICARE

## 2021-01-06 DIAGNOSIS — C90.00 MULTIPLE MYELOMA NOT HAVING ACHIEVED REMISSION (H): Primary | ICD-10-CM

## 2021-01-06 LAB
ALBUMIN SERPL-MCNC: 3.4 G/DL (ref 3.4–5)
ALP SERPL-CCNC: 76 U/L (ref 40–150)
ALT SERPL W P-5'-P-CCNC: 22 U/L (ref 0–50)
ANION GAP SERPL CALCULATED.3IONS-SCNC: 1 MMOL/L (ref 3–14)
AST SERPL W P-5'-P-CCNC: 10 U/L (ref 0–45)
BASOPHILS # BLD AUTO: 0 10E9/L (ref 0–0.2)
BASOPHILS NFR BLD AUTO: 0.7 %
BILIRUB SERPL-MCNC: 0.4 MG/DL (ref 0.2–1.3)
BUN SERPL-MCNC: 21 MG/DL (ref 7–30)
CALCIUM SERPL-MCNC: 9.1 MG/DL (ref 8.5–10.1)
CHLORIDE SERPL-SCNC: 105 MMOL/L (ref 94–109)
CO2 SERPL-SCNC: 33 MMOL/L (ref 20–32)
CREAT SERPL-MCNC: 0.67 MG/DL (ref 0.52–1.04)
DIFFERENTIAL METHOD BLD: ABNORMAL
EOSINOPHIL # BLD AUTO: 0.1 10E9/L (ref 0–0.7)
EOSINOPHIL NFR BLD AUTO: 2 %
ERYTHROCYTE [DISTWIDTH] IN BLOOD BY AUTOMATED COUNT: 12.9 % (ref 10–15)
GFR SERPL CREATININE-BSD FRML MDRD: 80 ML/MIN/{1.73_M2}
GLUCOSE SERPL-MCNC: 97 MG/DL (ref 70–99)
HCT VFR BLD AUTO: 35.9 % (ref 35–47)
HGB BLD-MCNC: 11.8 G/DL (ref 11.7–15.7)
IMM GRANULOCYTES # BLD: 0 10E9/L (ref 0–0.4)
IMM GRANULOCYTES NFR BLD: 0.2 %
LYMPHOCYTES # BLD AUTO: 1.7 10E9/L (ref 0.8–5.3)
LYMPHOCYTES NFR BLD AUTO: 40.8 %
MCH RBC QN AUTO: 31.6 PG (ref 26.5–33)
MCHC RBC AUTO-ENTMCNC: 32.9 G/DL (ref 31.5–36.5)
MCV RBC AUTO: 96 FL (ref 78–100)
MONOCYTES # BLD AUTO: 0.4 10E9/L (ref 0–1.3)
MONOCYTES NFR BLD AUTO: 10.4 %
NEUTROPHILS # BLD AUTO: 1.9 10E9/L (ref 1.6–8.3)
NEUTROPHILS NFR BLD AUTO: 45.9 %
NRBC # BLD AUTO: 0 10*3/UL
NRBC BLD AUTO-RTO: 0 /100
PLATELET # BLD AUTO: 239 10E9/L (ref 150–450)
POTASSIUM SERPL-SCNC: 3.9 MMOL/L (ref 3.4–5.3)
PROT SERPL-MCNC: 6.8 G/DL (ref 6.8–8.8)
RBC # BLD AUTO: 3.73 10E12/L (ref 3.8–5.2)
SODIUM SERPL-SCNC: 139 MMOL/L (ref 133–144)
WBC # BLD AUTO: 4 10E9/L (ref 4–11)

## 2021-01-06 PROCEDURE — 36415 COLL VENOUS BLD VENIPUNCTURE: CPT

## 2021-01-06 PROCEDURE — 84165 PROTEIN E-PHORESIS SERUM: CPT | Mod: 26 | Performed by: PATHOLOGY

## 2021-01-06 PROCEDURE — 84165 PROTEIN E-PHORESIS SERUM: CPT | Mod: TC | Performed by: INTERNAL MEDICINE

## 2021-01-06 PROCEDURE — 83883 ASSAY NEPHELOMETRY NOT SPEC: CPT | Performed by: INTERNAL MEDICINE

## 2021-01-06 PROCEDURE — 82784 ASSAY IGA/IGD/IGG/IGM EACH: CPT | Performed by: INTERNAL MEDICINE

## 2021-01-06 PROCEDURE — 80053 COMPREHEN METABOLIC PANEL: CPT | Performed by: INTERNAL MEDICINE

## 2021-01-06 PROCEDURE — 85025 COMPLETE CBC W/AUTO DIFF WBC: CPT | Performed by: INTERNAL MEDICINE

## 2021-01-06 PROCEDURE — 999N001036 HC STATISTIC TOTAL PROTEIN: Performed by: INTERNAL MEDICINE

## 2021-01-06 NOTE — LETTER
1/6/2021         RE: Quiana Dunaway  [address] [facility] [address]        Dear Colleague,    Thank you for referring your patient, Quiana Dunaway, to the [facility]. Please see a copy of my visit note below.    Medical Assistant Note:  Quiana Dunaway presents today for blood draw.    Patient seen by provider today: No.   present during visit today: Not Applicable.    Concerns: No Concerns.    Procedure:  Lab draw site: RAC, Needle type: Butterfly, Gauge: 23.    Post Assessment:  Labs drawn without difficulty: Yes.    Discharge Plan:  Departure Mode: Ambulatory.    Face to Face Time: 4 min.    Shari Schoenberger, [facility]      Again, thank you for allowing me to participate in the care of your patient.        Sincerely,         Lab Draw

## 2021-01-06 NOTE — PROGRESS NOTES
Medical Assistant Note:  Quiana Dunaway presents today for blood draw.    Patient seen by provider today: No.   present during visit today: Not Applicable.    Concerns: No Concerns.    Procedure:  Lab draw site: RAC, Needle type: Butterfly, Gauge: 23.    Post Assessment:  Labs drawn without difficulty: Yes.    Discharge Plan:  Departure Mode: Ambulatory.    Face to Face Time: 4 min.    Shari Schoenberger, CMA

## 2021-01-06 NOTE — PROGRESS NOTES
Oral Chemotherapy Monitoring Program  Lab Follow Up    Reviewed lab results from 01/06/21.    ORAL CHEMOTHERAPY 5/7/2018 6/4/2018 7/9/2018 6/4/2019 10/27/2020 12/2/2020 1/6/2021   Assessment Type - - - - Quarterly Follow up Lab Monitoring Lab Monitoring   Diagnosis Code - - - - Multiple Myeloma Multiple Myeloma Multiple Myeloma   Providers - - - - Alex Parry   Clinic Name/Location - - - - [facility]   Drug Name Revlimid (Lenalidomide) Revlimid (Lenalidomide) Revlimid (Lenalidomide) Revlimid (Lenalidomide) Revlimid (Lenalidomide) Revlimid (Lenalidomide) Revlimid (Lenalidomide)   Dose - - - - 10 mg 10 mg 10 mg   Current Schedule Daily Daily Daily Daily Daily Daily Daily   Cycle Details 3 weeks on 1 week off 3 weeks on 1 week off 2 weeks on 1 week off 2 weeks on 1 week off 2 weeks on, 1 week off 2 weeks on, 1 week off 2 weeks on, 1 week off   Start Date of Last Cycle 5/7/2018 6/4/2018 7/1/2018 - 10/7/2020 - 12/9/2020   Planned next cycle start date 6/4/2018 7/2/2018 7/22/2018 - 10/28/2020 - 1/7/2021   Doses missed in last 2 weeks 0 0 0 - 0 - -   Adherence Assessment - Adherent Adherent - Adherent - -   Adverse Effects - No AE identified during assessment Fatigue - No AE identified during assessment - -   Fatigue - - (No Data) - - - -   Pharmacist Intervention(fatigue) - - Yes - - - -   Home BPs - Not applicable Not applicable - Not applicable - -   Any new drug interactions? No No No No No - -   Is the dose as ordered appropriate for the patient? Yes Yes Yes Yes Yes - -   Is the patient currently in pain? - Assessed in last 30 days. Assessed in last 30 days. Assessed in last 30 days. No - -   Has the patient been assessed within the past 6 months for depression? - Yes Yes Yes No - -   Has the patient missed any days of school, work, or other routine activity? - No No - No - -   Since the last time we talked, have you been hospitalized or used the emergency room? - - - - No - -        Labs:  _  Result Component Current Result Ref Range   Sodium 139 (1/6/2021) 133 - 144 mmol/L     _  Result Component Current Result Ref Range   Potassium 3.9 (1/6/2021) 3.4 - 5.3 mmol/L     _  Result Component Current Result Ref Range   Calcium 9.1 (1/6/2021) 8.5 - 10.1 mg/dL     No results found for Mag within last 30 days.     No results found for Phos within last 30 days.     _  Result Component Current Result Ref Range   Albumin 3.4 (1/6/2021) 3.4 - 5.0 g/dL     _  Result Component Current Result Ref Range   Urea Nitrogen 21 (1/6/2021) 7 - 30 mg/dL     _  Result Component Current Result Ref Range   Creatinine 0.67 (1/6/2021) 0.52 - 1.04 mg/dL     _  Result Component Current Result Ref Range   AST 10 (1/6/2021) 0 - 45 U/L     _  Result Component Current Result Ref Range   ALT 22 (1/6/2021) 0 - 50 U/L     _  Result Component Current Result Ref Range   Bilirubin Total 0.4 (1/6/2021) 0.2 - 1.3 mg/dL     _  Result Component Current Result Ref Range   WBC 4.0 (1/6/2021) 4.0 - 11.0 10e9/L     _  Result Component Current Result Ref Range   Hemoglobin 11.8 (1/6/2021) 11.7 - 15.7 g/dL     _  Result Component Current Result Ref Range   Platelet Count 239 (1/6/2021) 150 - 450 10e9/L     _  Result Component Current Result Ref Range   Absolute Neutrophil 1.9 (1/6/2021) 1.6 - 8.3 10e9/L       Assessment & Plan:  No concerning abnormalities. Revlimid was held for 1 week starting 12/30/20 due to low ANC (1.3). ANC back within normal limits and patient okay to restart Revlimid per Juaquin. Spoke with daughter and confirmed that she will be starting this next cycle on 1/7/21. Ok to proceed with Revlimid and monthly labs.    Questions answered to patient's satisfaction.    Follow-Up:  3 weeks for labs and follow-up appointment.    Rachel Moniz, Pharmacist Intern  January 06, 2021    Laci BARTON PharmD [facility].

## 2021-01-07 LAB
IGG SERPL-MCNC: 905 MG/DL (ref 610–1616)
KAPPA LC UR-MCNC: 27.53 MG/DL (ref 0.33–1.94)
KAPPA LC/LAMBDA SER: 48.3 {RATIO} (ref 0.26–1.65)
LAMBDA LC SERPL-MCNC: 0.57 MG/DL (ref 0.57–2.63)

## 2021-01-08 LAB
ALBUMIN SERPL ELPH-MCNC: 3.9 G/DL (ref 3.7–5.1)
ALPHA1 GLOB SERPL ELPH-MCNC: 0.3 G/DL (ref 0.2–0.4)
ALPHA2 GLOB SERPL ELPH-MCNC: 0.8 G/DL (ref 0.5–0.9)
B-GLOBULIN SERPL ELPH-MCNC: 0.7 G/DL (ref 0.6–1)
GAMMA GLOB SERPL ELPH-MCNC: 0.9 G/DL (ref 0.7–1.6)
M PROTEIN SERPL ELPH-MCNC: 0.6 G/DL
PROT PATTERN SERPL ELPH-IMP: ABNORMAL

## 2021-01-14 ENCOUNTER — OFFICE VISIT (OUTPATIENT)
Dept: FAMILY MEDICINE | Facility: CLINIC | Age: 86
End: 2021-01-14
Payer: MEDICARE

## 2021-01-14 VITALS
HEIGHT: 62 IN | DIASTOLIC BLOOD PRESSURE: 74 MMHG | OXYGEN SATURATION: 96 % | TEMPERATURE: 98.9 F | SYSTOLIC BLOOD PRESSURE: 127 MMHG | HEART RATE: 79 BPM | BODY MASS INDEX: 26.87 KG/M2 | WEIGHT: 146 LBS

## 2021-01-14 DIAGNOSIS — C90.00 MULTIPLE MYELOMA NOT HAVING ACHIEVED REMISSION (H): ICD-10-CM

## 2021-01-14 DIAGNOSIS — N32.81 OVERACTIVE BLADDER: ICD-10-CM

## 2021-01-14 DIAGNOSIS — N18.30 STAGE 3 CHRONIC KIDNEY DISEASE, UNSPECIFIED WHETHER STAGE 3A OR 3B CKD (H): ICD-10-CM

## 2021-01-14 DIAGNOSIS — R19.7 DIARRHEA, UNSPECIFIED TYPE: Primary | ICD-10-CM

## 2021-01-14 DIAGNOSIS — I49.5 SICK SINUS SYNDROME (H): ICD-10-CM

## 2021-01-14 DIAGNOSIS — I50.32 CHRONIC DIASTOLIC CONGESTIVE HEART FAILURE (H): ICD-10-CM

## 2021-01-14 PROCEDURE — 99214 OFFICE O/P EST MOD 30 MIN: CPT | Performed by: INTERNAL MEDICINE

## 2021-01-14 ASSESSMENT — MIFFLIN-ST. JEOR: SCORE: 1065.5

## 2021-01-14 NOTE — PROGRESS NOTES
Assessment & Plan     Multiple myeloma not having achieved remission (H)  Chronic diastolic congestive heart failure (H)  Sick sinus syndrome (H)  Stage 3 chronic kidney disease, unspecified whether stage 3a or 3b CKD  Diarrhea, unspecified type  Overactive bladder    Continue follow-up with oncology as directed for treatment of her myeloma which seems to be stable currently.  Her volume status appears to be stable and it is very important for her to stay on her furosemide to maintain euvolemia.  This was explained to her.  For her urinary frequency symptoms, a good for step would be to cut back on diet pop and diet ice tea.  Regarding her diarrhea, artificial sweeteners are a common cause for diarrhea and seniors.  We discussed this.  We also discussed possibly eliminating lactose containing dairy products or using lactase supplements to help with intermittent diarrhea as well.  If those interventions do not help enough, return to clinic.          20 minutes spent on the date of the encounter doing chart review, history and exam, documentation and further activities as noted above      Return in about 2 months (around 3/14/2021) for recheck.  Patient instructed to return to clinic or contact us sooner if symptoms worsen or new symptoms develop.     César Chung MD  [facility] MAGALY Araya is a 85 year old who presents to clinic today for the following health issues  accompanied by her Daughter in law:    HPI     85-year-old female with a history of multiple myeloma undergoing chemotherapy, also with a history of hypertension, diastolic congestive heart failure, anxiety, overactive bladder.  She does not drink alcohol.  She does not smoke cigarettes.  She does however drink a lot of diet soda and diet ice tea.  She complains of intermittent nonbloody diarrhea that has been present for several months.  She denies associated vomiting, fevers, weight loss.  The diarrhea is mild.   "She also wonders if she needs to keep taking furosemide because it makes her urinate too frequently.  She did recently see a urology provider who recommended that she cut down on ice tea and soda intake.        Review of Systems   Constitutional, HEENT, cardiovascular, pulmonary, gi and gu systems are negative, except as otherwise noted.      Objective    /74 (BP Location: Left arm, Cuff Size: Adult Regular)   Pulse 79   Temp 98.9  F (37.2  C) (Temporal)   Ht 1.575 m (5' 2\")   Wt 66.2 kg (146 lb)   SpO2 96%   Breastfeeding No   BMI 26.70 kg/m    Body mass index is 26.7 kg/m .  Physical Exam   General: This is a well-appearing female in no acute distress.  She appears well-nourished.  Given her multiple medical problems, she looks surprisingly well.  Cardiovascular: The heart has a regular rate and rhythm today.  Pulmonary: The lungs are clear to auscultation bilaterally, breathing does not appear to be labored.  Abdomen: Soft, not distended, not tender, bowel sounds present, no masses, no organomegaly.  Extremities: Well perfused and without edema.  Neurological: Alert and oriented to person place and time, cranial nerves II to XII are grossly intact, she walks with a cane.  Mental status: Appropriate mood and affect, well-groomed, normal speech.                "

## 2021-01-16 DIAGNOSIS — C90.00 MULTIPLE MYELOMA NOT HAVING ACHIEVED REMISSION (H): Primary | ICD-10-CM

## 2021-01-18 RX ORDER — LENALIDOMIDE 10 MG/1
10 CAPSULE ORAL DAILY
Qty: 14 CAPSULE | Refills: 0 | Status: SHIPPED | OUTPATIENT
Start: 2021-01-18 | End: 2021-03-16

## 2021-01-18 RX ORDER — DEXAMETHASONE 4 MG/1
4 TABLET ORAL WEEKLY
Qty: 15 TABLET | Refills: 0 | Status: SHIPPED | OUTPATIENT
Start: 2021-01-18 | End: 2021-03-16

## 2021-01-19 ENCOUNTER — TELEPHONE (OUTPATIENT)
Dept: PHARMACY | Facility: CLINIC | Age: 86
End: 2021-01-19

## 2021-01-20 NOTE — TELEPHONE ENCOUNTER
Oral Chemotherapy Monitoring Program   Medication: revlimid  Rx: 10mg PO daily on days 1 through 14 of 21 day cycle   Auth #: [id number]   Obtained on: 1/19/2021   Risk Category: adult female of non child bearing potential  Routine survey questions reviewed.   Rx to be Escribed to [facility]    Terrie Bowen [facility] Pharm[facility] Liaison  [phone]

## 2021-01-22 ENCOUNTER — TELEPHONE (OUTPATIENT)
Dept: ONCOLOGY | Facility: CLINIC | Age: 86
End: 2021-01-22

## 2021-01-22 NOTE — TELEPHONE ENCOUNTER
Oral Chemotherapy Monitoring Program    Subjective/Objective:  Quiana Dunaway is a 85 year old female contacted by phone for a follow-up visit for oral chemotherapy.     ORAL CHEMOTHERAPY 6/4/2018 7/9/2018 6/4/2019 10/27/2020 12/2/2020 1/6/2021 1/22/2021   Assessment Type - - - Quarterly Follow up Lab Monitoring Lab Monitoring -   Diagnosis Code - - - Multiple Myeloma Multiple Myeloma Multiple Myeloma Multiple Myeloma   Providers - - - Alex Parry   Clinic Name/Location - - - [facility]   Drug Name Revlimid (Lenalidomide) Revlimid (Lenalidomide) Revlimid (Lenalidomide) Revlimid (Lenalidomide) Revlimid (Lenalidomide) Revlimid (Lenalidomide) Revlimid (Lenalidomide)   Dose - - - 10 mg 10 mg 10 mg 10 mg   Current Schedule Daily Daily Daily Daily Daily Daily Daily   Cycle Details 3 weeks on 1 week off 2 weeks on 1 week off 2 weeks on 1 week off 2 weeks on, 1 week off 2 weeks on, 1 week off 2 weeks on, 1 week off 2 weeks on, 1 week off   Start Date of Last Cycle 6/4/2018 7/1/2018 - 10/7/2020 - 12/9/2020 12/30/2020   Planned next cycle start date 7/2/2018 7/22/2018 - 10/28/2020 - 1/7/2021 1/26/2021   Doses missed in last 2 weeks 0 0 - 0 - - -   Adherence Assessment Adherent Adherent - Adherent - - -   Adverse Effects No AE identified during assessment Fatigue - No AE identified during assessment - - No AE identified during assessment   Fatigue - (No Data) - - - - -   Pharmacist Intervention(fatigue) - Yes - - - - -   Home BPs Not applicable Not applicable - Not applicable - - -   Any new drug interactions? No No No No - - No   Is the dose as ordered appropriate for the patient? Yes Yes Yes Yes - - Yes   Is the patient currently in pain? Assessed in last 30 days. Assessed in last 30 days. Assessed in last 30 days. No - - -   Has the patient been assessed within the past 6 months for depression? Yes Yes Yes No - - -   Has the patient missed any days of school, work, or  "other routine activity? No No - No - - -   Since the last time we talked, have you been hospitalized or used the emergency room? - - - No - - -       Vitals:  BP:   BP Readings from Last 1 Encounters:   01/14/21 127/74     Wt Readings from Last 1 Encounters:   01/14/21 66.2 kg (146 lb)     Estimated body surface area is 1.7 meters squared as calculated from the following:    Height as of 1/14/21: 1.575 m (5' 2\").    Weight as of 1/14/21: 66.2 kg (146 lb).    Labs:  _  Result Component Current Result Ref Range   Sodium 139 (1/6/2021) 133 - 144 mmol/L     _  Result Component Current Result Ref Range   Potassium 3.9 (1/6/2021) 3.4 - 5.3 mmol/L     _  Result Component Current Result Ref Range   Calcium 9.1 (1/6/2021) 8.5 - 10.1 mg/dL     No results found for Mag within last 30 days.     No results found for Phos within last 30 days.     _  Result Component Current Result Ref Range   Albumin 3.4 (1/6/2021) 3.4 - 5.0 g/dL     _  Result Component Current Result Ref Range   Urea Nitrogen 21 (1/6/2021) 7 - 30 mg/dL     _  Result Component Current Result Ref Range   Creatinine 0.67 (1/6/2021) 0.52 - 1.04 mg/dL       _  Result Component Current Result Ref Range   AST 10 (1/6/2021) 0 - 45 U/L     _  Result Component Current Result Ref Range   ALT 22 (1/6/2021) 0 - 50 U/L     _  Result Component Current Result Ref Range   Bilirubin Total 0.4 (1/6/2021) 0.2 - 1.3 mg/dL       _  Result Component Current Result Ref Range   WBC 4.0 (1/6/2021) 4.0 - 11.0 10e9/L     _  Result Component Current Result Ref Range   Hemoglobin 11.8 (1/6/2021) 11.7 - 15.7 g/dL     _  Result Component Current Result Ref Range   Platelet Count 239 (1/6/2021) 150 - 450 10e9/L     _  Result Component Current Result Ref Range   Absolute Neutrophil 1.9 (1/6/2021) 1.6 - 8.3 10e9/ANA Campbell [facility]  [address] [place], [place]  [id number] [phone]      "

## 2021-02-01 ENCOUNTER — HOSPITAL ENCOUNTER (OUTPATIENT)
Facility: CLINIC | Age: 86
Setting detail: SPECIMEN
Discharge: HOME | End: 2021-02-01
Attending: INTERNAL MEDICINE | Admitting: INTERNAL MEDICINE
Payer: MEDICARE

## 2021-02-01 ENCOUNTER — OFFICE VISIT (OUTPATIENT)
Dept: INFUSION THERAPY | Facility: CLINIC | Age: 86
End: 2021-02-01
Attending: INTERNAL MEDICINE
Payer: MEDICARE

## 2021-02-01 ENCOUNTER — DOCUMENTATION ONLY (OUTPATIENT)
Dept: ONCOLOGY | Facility: CLINIC | Age: 86
End: 2021-02-01

## 2021-02-01 DIAGNOSIS — C90.00 MULTIPLE MYELOMA NOT HAVING ACHIEVED REMISSION (H): Primary | ICD-10-CM

## 2021-02-01 LAB
ALBUMIN SERPL-MCNC: 3.6 G/DL (ref 3.4–5)
ALP SERPL-CCNC: 86 U/L (ref 40–150)
ALT SERPL W P-5'-P-CCNC: 28 U/L (ref 0–50)
ANION GAP SERPL CALCULATED.3IONS-SCNC: 3 MMOL/L (ref 3–14)
AST SERPL W P-5'-P-CCNC: 17 U/L (ref 0–45)
BASOPHILS # BLD AUTO: 0 10E9/L (ref 0–0.2)
BASOPHILS NFR BLD AUTO: 0.5 %
BILIRUB SERPL-MCNC: 0.5 MG/DL (ref 0.2–1.3)
BUN SERPL-MCNC: 17 MG/DL (ref 7–30)
CALCIUM SERPL-MCNC: 9.7 MG/DL (ref 8.5–10.1)
CHLORIDE SERPL-SCNC: 108 MMOL/L (ref 94–109)
CO2 SERPL-SCNC: 31 MMOL/L (ref 20–32)
CREAT SERPL-MCNC: 0.66 MG/DL (ref 0.52–1.04)
DIFFERENTIAL METHOD BLD: NORMAL
EOSINOPHIL # BLD AUTO: 0 10E9/L (ref 0–0.7)
EOSINOPHIL NFR BLD AUTO: 0.3 %
ERYTHROCYTE [DISTWIDTH] IN BLOOD BY AUTOMATED COUNT: 13.5 % (ref 10–15)
GFR SERPL CREATININE-BSD FRML MDRD: 81 ML/MIN/{1.73_M2}
GLUCOSE SERPL-MCNC: 120 MG/DL (ref 70–99)
HCT VFR BLD AUTO: 36.9 % (ref 35–47)
HGB BLD-MCNC: 12.2 G/DL (ref 11.7–15.7)
IMM GRANULOCYTES # BLD: 0.1 10E9/L (ref 0–0.4)
IMM GRANULOCYTES NFR BLD: 1.3 %
LYMPHOCYTES # BLD AUTO: 0.8 10E9/L (ref 0.8–5.3)
LYMPHOCYTES NFR BLD AUTO: 20.6 %
MCH RBC QN AUTO: 31.9 PG (ref 26.5–33)
MCHC RBC AUTO-ENTMCNC: 33.1 G/DL (ref 31.5–36.5)
MCV RBC AUTO: 96 FL (ref 78–100)
MONOCYTES # BLD AUTO: 0.1 10E9/L (ref 0–1.3)
MONOCYTES NFR BLD AUTO: 1.5 %
NEUTROPHILS # BLD AUTO: 3 10E9/L (ref 1.6–8.3)
NEUTROPHILS NFR BLD AUTO: 75.8 %
NRBC # BLD AUTO: 0 10*3/UL
NRBC BLD AUTO-RTO: 0 /100
PLATELET # BLD AUTO: 208 10E9/L (ref 150–450)
POTASSIUM SERPL-SCNC: 4 MMOL/L (ref 3.4–5.3)
PROT SERPL-MCNC: 7.3 G/DL (ref 6.8–8.8)
RBC # BLD AUTO: 3.83 10E12/L (ref 3.8–5.2)
SODIUM SERPL-SCNC: 142 MMOL/L (ref 133–144)
WBC # BLD AUTO: 4 10E9/L (ref 4–11)

## 2021-02-01 PROCEDURE — 85025 COMPLETE CBC W/AUTO DIFF WBC: CPT | Performed by: INTERNAL MEDICINE

## 2021-02-01 PROCEDURE — 82784 ASSAY IGA/IGD/IGG/IGM EACH: CPT | Performed by: INTERNAL MEDICINE

## 2021-02-01 PROCEDURE — 999N001036 HC STATISTIC TOTAL PROTEIN: Performed by: INTERNAL MEDICINE

## 2021-02-01 PROCEDURE — 80053 COMPREHEN METABOLIC PANEL: CPT | Performed by: INTERNAL MEDICINE

## 2021-02-01 PROCEDURE — 83883 ASSAY NEPHELOMETRY NOT SPEC: CPT | Performed by: INTERNAL MEDICINE

## 2021-02-01 PROCEDURE — 84165 PROTEIN E-PHORESIS SERUM: CPT | Mod: TC | Performed by: INTERNAL MEDICINE

## 2021-02-01 PROCEDURE — 36415 COLL VENOUS BLD VENIPUNCTURE: CPT

## 2021-02-01 NOTE — PROGRESS NOTES
Oral Chemotherapy Program  Lab review     Reviewed labs from 2/01/2021     Labs are unremarkable and do not require any dose adjustments at this time     Follow-up/plan  Continue lenalidomide 10mg 2 weeks on, 1 week off.  Will follow-up with labs on 3/2/21 at 1:30pm     Suzan Pedersen, Pharm. D., [facility]  2/1/2021

## 2021-02-01 NOTE — LETTER
2/1/2021         RE: Quiana Dunaway  [address] [facility] [address]        Dear Colleague,    Thank you for referring your patient, Quiana Dunaway, to the [facility]. Please see a copy of my visit note below.    Medical Assistant Note:  Quiana Dunaway presents today for lab draw.    Patient seen by provider today: No.   present during visit today: Not Applicable.    Concerns: No Concerns.    Procedure:  Lab draw site: LAC, Needle type: BF, Gauge: 21. Gauze and coban applied    Post Assessment:  Labs drawn without difficulty: Yes.    Discharge Plan:  Departure Mode: Ambulatory.    Face to Face Time: 5.    Sultana Jones CMA                Again, thank you for allowing me to participate in the care of your patient.        Sincerely,         Lab Draw

## 2021-02-02 ENCOUNTER — VIRTUAL VISIT (OUTPATIENT)
Dept: ONCOLOGY | Facility: CLINIC | Age: 86
End: 2021-02-02
Attending: INTERNAL MEDICINE
Payer: MEDICARE

## 2021-02-02 VITALS — WEIGHT: 145 LBS | BODY MASS INDEX: 26.52 KG/M2

## 2021-02-02 DIAGNOSIS — C90.00 MULTIPLE MYELOMA NOT HAVING ACHIEVED REMISSION (H): Primary | ICD-10-CM

## 2021-02-02 PROCEDURE — 99214 OFFICE O/P EST MOD 30 MIN: CPT | Mod: 95 | Performed by: NURSE PRACTITIONER

## 2021-02-02 PROCEDURE — 999N001193 HC VIDEO/TELEPHONE VISIT; NO CHARGE

## 2021-02-02 ASSESSMENT — PAIN SCALES - GENERAL: PAINLEVEL: NO PAIN (0)

## 2021-02-02 NOTE — PROGRESS NOTES
"The patient has been notified of following:      \"This telephone visit will be conducted via a call between you and your physician/provider. We have found that certain health care needs can be provided without the need for a physical exam.  This service lets us provide the care you need with a short phone conversation during the COVID-19 pandemic.  If a prescription is necessary we can send it directly to your pharmacy.  If lab work is needed we can place an order for that and you can then stop by our lab to have the test done at a later time.     Telephone visits are billed at different rates depending on your insurance coverage. During this emergency period, for some insurers they may be billed the same as an in-person visit.  Please reach out to your insurance provider with any questions.     If during the course of the call the physician/provider feels a telephone visit is not appropriate, you will not be charged for this service.\"     Patient has given verbal consent for Telephone visit?  Yes       telephone visit 10 min       Oncology/Hematology Visit Note  Feb 2, 2021    Reason for Visit: follow up of multiple myeloma    Patient is currently taking Revlimid 10 mg 2 weeks on 1 week off  Dexamethasone 4 mg once a week  And Zometa every 3 months    Interval History:  Patient reports overall she is feeling well.  Fever chills sweats cough shortness of breath chest pain    Review of Systems:  14 point ROS of systems including Constitutional, Eyes, Respiratory, Cardiovascular, Gastroenterology, Genitourinary, Integumentary, Muscularskeletal, Psychiatric were all negative except for pertinent positives noted in my HPI.    Physical Examination:  General: The patient is a pleasant female in no acute distress.  Wt 65.8 kg (145 lb)   BMI 26.52 kg/m    HEENT: EOMI, PERRL. Sclerae are anicteric. Oral mucosa is pink and moist with no lesions or thrush.   Lymph: Neck is supple with no lymphadenopathy in the cervical or " supraclavicular areas.   Heart: Regular rate and rhythm.   Lungs: Clear to auscultation bilaterally.   GI: Bowel sounds present, soft, nontender with no palpable hepatosplenomegaly or masses.   Extremities: No lower extremity edema noted bilaterally.   Skin: No rashes, petechiae, or bruising noted on exposed skin.    Laboratory Data:  No results found for this or any previous visit (from the past 24 hour(s)).      Assessment and Plan:      This is a 85-year-old female with     multiple myeloma  Currently getting treatment with Revlimid 10 mg p.o. 14 days on 7 days off dexamethasone 4 mg a week    Multiple myeloma labs are pending   CBC and CMP are atble  continue with Revlimid and dexamethasone  -continue with aspirin  -Patient has follow-up appointment with Dr. Parry in 4 weeks    Bone health  Patient has been tolerating Zometa well continue with Zometa every 3 months      ADDENDUM   Ravia Lambda -is slowly rising. 53.59  IgG 959  -reviewed with Dr. Parry- continue with the same treatment  -Labs discussed with patient's daughter in law.         Please call our clinic with any changes in your health condition or questions       MARIUSZ Mckeon [facility]   [facility] [place] [place]     Chart documentation with Dragon Voice recognition Software. Although reviewed after completion, some words and grammatical errors may remain.

## 2021-02-02 NOTE — PROGRESS NOTES
Quiana is a 85 year old who is being evaluated via a billable video visit.      How would you like to obtain your AVS? MyChart  If the video visit is dropped, the invitation should be resent by: Send to e-mail at: [email or web address]  Will anyone else be joining your video visit? Yes: daughter in-law Regi. How would they like to receive their invitation? Send to e-mail at: [email or web address]      Video Start Time:    Video-Visit Details    Type of service:  Video Visit    Video End Time:     Originating Location (pt. Location): Home    Distant Location (provider location):  [facility]     Platform used for Video Visit: Tae Jones CMA

## 2021-02-02 NOTE — LETTER
"    2/2/2021         RE: Quiana Dunaway  [address] [facility] [address]        Dear Colleague,    Thank you for referring your patient, Quiana Dunaway, to the [facility]. Please see a copy of my visit note below.    Quiana is a 85 year old who is being evaluated via a billable video visit.      How would you like to obtain your AVS? MyChart  If the video visit is dropped, the invitation should be resent by: Send to e-mail at: [email or web address]  Will anyone else be joining your video visit? Yes: daughter in-law Jocelin. How would they like to receive their invitation? Send to e-mail at: [email or web address]      Video Start Time:    Video-Visit Details    Type of service:  Video Visit    Video End Time:     Originating Location (pt. Location): Home    Distant Location (provider location):  [facility]     Platform used for Video Visit: Tae Jones CMA        The patient has been notified of following:      \"This telephone visit will be conducted via a call between you and your physician/provider. We have found that certain health care needs can be provided without the need for a physical exam.  This service lets us provide the care you need with a short phone conversation during the COVID-19 pandemic.  If a prescription is necessary we can send it directly to your pharmacy.  If lab work is needed we can place an order for that and you can then stop by our lab to have the test done at a later time.     Telephone visits are billed at different rates depending on your insurance coverage. During this emergency period, for some insurers they may be billed the same as an in-person visit.  Please reach out to your insurance provider with any questions.     If during the course of the call the physician/provider feels a telephone visit is not appropriate, you will not be charged for this service.\"     Patient has given verbal consent " for Telephone visit?  Yes       telephone visit 10 min       Oncology/Hematology Visit Note  Feb 2, 2021    Reason for Visit: follow up of multiple myeloma    Patient is currently taking Revlimid 10 mg 2 weeks on 1 week off  Dexamethasone 4 mg once a week  And Zometa every 3 months    Interval History:  Patient reports overall she is feeling well.  Fever chills sweats cough shortness of breath chest pain    Review of Systems:  14 point ROS of systems including Constitutional, Eyes, Respiratory, Cardiovascular, Gastroenterology, Genitourinary, Integumentary, Muscularskeletal, Psychiatric were all negative except for pertinent positives noted in my HPI.    Physical Examination:  General: The patient is a pleasant female in no acute distress.  Wt 65.8 kg (145 lb)   BMI 26.52 kg/m    HEENT: EOMI, PERRL. Sclerae are anicteric. Oral mucosa is pink and moist with no lesions or thrush.   Lymph: Neck is supple with no lymphadenopathy in the cervical or supraclavicular areas.   Heart: Regular rate and rhythm.   Lungs: Clear to auscultation bilaterally.   GI: Bowel sounds present, soft, nontender with no palpable hepatosplenomegaly or masses.   Extremities: No lower extremity edema noted bilaterally.   Skin: No rashes, petechiae, or bruising noted on exposed skin.    Laboratory Data:  No results found for this or any previous visit (from the past 24 hour(s)).      Assessment and Plan:      This is a 85-year-old female with     multiple myeloma  Currently getting treatment with Revlimid 10 mg p.o. 14 days on 7 days off dexamethasone 4 mg a week    Multiple myeloma labs are pending   CBC and CMP are atble  continue with Revlimid and dexamethasone  -continue with aspirin  -Patient has follow-up appointment with Dr. Parry in 4 weeks    Bone health  Patient has been tolerating Zometa well continue with Zometa every 3 months      Please call our clinic with any changes in your health condition or questions       MARIUSZ Mckeon  MICHAEL  [facility]     Chart documentation with Dragon Voice recognition Software. Although reviewed after completion, some words and grammatical errors may remain.            Again, thank you for allowing me to participate in the care of your patient.        Sincerely,        MARIUSZ Mckeon CNP

## 2021-02-02 NOTE — LETTER
"    2/2/2021         RE: Quiana Dunaway  [address] [facility] [address]        Dear Colleague,    Thank you for referring your patient, Quiana Dunaway, to the [facility]. Please see a copy of my visit note below.    Quiana is a 85 year old who is being evaluated via a billable video visit.      How would you like to obtain your AVS? MyChart  If the video visit is dropped, the invitation should be resent by: Send to e-mail at: [email or web address]  Will anyone else be joining your video visit? Yes: daughter in-law Jocelin. How would they like to receive their invitation? Send to e-mail at: [email or web address]      Video Start Time:    Video-Visit Details    Type of service:  Video Visit    Video End Time:     Originating Location (pt. Location): Home    Distant Location (provider location):  [facility]     Platform used for Video Visit: Tae Jones CMA        The patient has been notified of following:      \"This telephone visit will be conducted via a call between you and your physician/provider. We have found that certain health care needs can be provided without the need for a physical exam.  This service lets us provide the care you need with a short phone conversation during the COVID-19 pandemic.  If a prescription is necessary we can send it directly to your pharmacy.  If lab work is needed we can place an order for that and you can then stop by our lab to have the test done at a later time.     Telephone visits are billed at different rates depending on your insurance coverage. During this emergency period, for some insurers they may be billed the same as an in-person visit.  Please reach out to your insurance provider with any questions.     If during the course of the call the physician/provider feels a telephone visit is not appropriate, you will not be charged for this service.\"     Patient has given verbal consent " for Telephone visit?  Yes       telephone visit 10 min       Oncology/Hematology Visit Note  Feb 2, 2021    Reason for Visit: follow up of multiple myeloma    Patient is currently taking Revlimid 10 mg 2 weeks on 1 week off  Dexamethasone 4 mg once a week  And Zometa every 3 months    Interval History:  Patient reports overall she is feeling well.  Fever chills sweats cough shortness of breath chest pain    Review of Systems:  14 point ROS of systems including Constitutional, Eyes, Respiratory, Cardiovascular, Gastroenterology, Genitourinary, Integumentary, Muscularskeletal, Psychiatric were all negative except for pertinent positives noted in my HPI.    Physical Examination:  General: The patient is a pleasant female in no acute distress.  Wt 65.8 kg (145 lb)   BMI 26.52 kg/m    HEENT: EOMI, PERRL. Sclerae are anicteric. Oral mucosa is pink and moist with no lesions or thrush.   Lymph: Neck is supple with no lymphadenopathy in the cervical or supraclavicular areas.   Heart: Regular rate and rhythm.   Lungs: Clear to auscultation bilaterally.   GI: Bowel sounds present, soft, nontender with no palpable hepatosplenomegaly or masses.   Extremities: No lower extremity edema noted bilaterally.   Skin: No rashes, petechiae, or bruising noted on exposed skin.    Laboratory Data:  No results found for this or any previous visit (from the past 24 hour(s)).      Assessment and Plan:      This is a 85-year-old female with     multiple myeloma  Currently getting treatment with Revlimid 10 mg p.o. 14 days on 7 days off dexamethasone 4 mg a week    Multiple myeloma labs are pending   CBC and CMP are atble  continue with Revlimid and dexamethasone  -continue with aspirin  -Patient has follow-up appointment with Dr. Parry in 4 weeks    Bone health  Patient has been tolerating Zometa well continue with Zometa every 3 months      Please call our clinic with any changes in your health condition or questions       MARIUSZ Mckeon  MICHAEL  [facility]     Chart documentation with Dragon Voice recognition Software. Although reviewed after completion, some words and grammatical errors may remain.            Again, thank you for allowing me to participate in the care of your patient.        Sincerely,        MARIUSZ Mckeon CNP

## 2021-02-03 ENCOUNTER — TELEPHONE (OUTPATIENT)
Dept: FAMILY MEDICINE | Facility: CLINIC | Age: 86
End: 2021-02-03

## 2021-02-03 LAB
ALBUMIN SERPL ELPH-MCNC: 4.2 G/DL (ref 3.7–5.1)
ALPHA1 GLOB SERPL ELPH-MCNC: 0.3 G/DL (ref 0.2–0.4)
ALPHA2 GLOB SERPL ELPH-MCNC: 0.8 G/DL (ref 0.5–0.9)
B-GLOBULIN SERPL ELPH-MCNC: 0.7 G/DL (ref 0.6–1)
GAMMA GLOB SERPL ELPH-MCNC: 0.9 G/DL (ref 0.7–1.6)
IGG SERPL-MCNC: 959 MG/DL (ref 610–1616)
KAPPA LC UR-MCNC: 23.58 MG/DL (ref 0.33–1.94)
KAPPA LC/LAMBDA SER: 53.59 {RATIO} (ref 0.26–1.65)
LAMBDA LC SERPL-MCNC: 0.44 MG/DL (ref 0.57–2.63)
M PROTEIN SERPL ELPH-MCNC: 0.6 G/DL
PROT PATTERN SERPL ELPH-IMP: ABNORMAL

## 2021-02-03 NOTE — TELEPHONE ENCOUNTER
ANGIE PCP:     Pt had 1st dose of COVID19 vaccine 2 week ago     In 2 weeks gets second dose    In 4 weeks is great niece's wedding in [place] - outdoor wedding     Pt wanted you to know and if any concerns with her traveling due to her medical history asks that we call her, otherwise no need to call back. Will be traveling with family in the airplane and will be very careful - plans to wear a mask, distance, and do good hand hygiene.     Grace LIGHT RN

## 2021-02-03 NOTE — TELEPHONE ENCOUNTER
Placed call.  Information given to patient from PCP.  States understood and agreed with advise.  Hillary Krueger RN

## 2021-02-03 NOTE — TELEPHONE ENCOUNTER
I think it would be OK to go   It is a slight risk since the vaccines are only 95% effective  However, I think the risk is low  I would still wear masks and try to distance

## 2021-02-10 DIAGNOSIS — C90.00 MULTIPLE MYELOMA NOT HAVING ACHIEVED REMISSION (H): ICD-10-CM

## 2021-02-10 DIAGNOSIS — C90.00 MULTIPLE MYELOMA NOT HAVING ACHIEVED REMISSION (H): Primary | ICD-10-CM

## 2021-02-10 RX ORDER — ACYCLOVIR 400 MG/1
400 TABLET ORAL 2 TIMES DAILY
Qty: 60 TABLET | Refills: 5 | Status: CANCELLED | OUTPATIENT
Start: 2021-02-10

## 2021-02-10 RX ORDER — ACYCLOVIR 400 MG/1
400 TABLET ORAL 2 TIMES DAILY
Qty: 60 TABLET | Refills: 5 | Status: SHIPPED | OUTPATIENT
Start: 2021-02-10 | End: 2021-02-17

## 2021-02-11 DIAGNOSIS — C90.00 MULTIPLE MYELOMA NOT HAVING ACHIEVED REMISSION (H): ICD-10-CM

## 2021-02-11 DIAGNOSIS — C90.00 MULTIPLE MYELOMA NOT HAVING ACHIEVED REMISSION (H): Primary | ICD-10-CM

## 2021-02-11 RX ORDER — LENALIDOMIDE 10 MG/1
10 CAPSULE ORAL DAILY
Qty: 14 CAPSULE | Refills: 0 | Status: SHIPPED | OUTPATIENT
Start: 2021-02-11 | End: 2021-03-16

## 2021-02-11 RX ORDER — LORAZEPAM 0.5 MG/1
0.5 TABLET ORAL EVERY 6 HOURS PRN
Qty: 30 TABLET | Refills: 3 | Status: SHIPPED | OUTPATIENT
Start: 2021-02-11 | End: 2021-04-14

## 2021-02-11 RX ORDER — DEXAMETHASONE 4 MG/1
4 TABLET ORAL WEEKLY
Qty: 15 TABLET | Refills: 0 | Status: SHIPPED | OUTPATIENT
Start: 2021-02-11 | End: 2021-03-16

## 2021-02-12 ENCOUNTER — TELEPHONE (OUTPATIENT)
Dept: PHARMACY | Facility: CLINIC | Age: 86
End: 2021-02-12

## 2021-02-12 NOTE — TELEPHONE ENCOUNTER
Oral Chemotherapy Monitoring Program   Medication: Revlimid  Rx: 10mg PO daily on days 1 through 14 of 21 day cycle   Auth #: [id number] obtained on 2/12/2021   Risk Category: adult female of non child bearing potential  Routine survey questions reviewed.   Rx to be Escribed to [facility]    Terrie Bowen [facility] Oncology Pharmacy Liaison  [phone]

## 2021-02-16 ENCOUNTER — ANCILLARY PROCEDURE (OUTPATIENT)
Dept: CARDIOLOGY | Facility: CLINIC | Age: 86
End: 2021-02-16
Attending: INTERNAL MEDICINE
Payer: MEDICARE

## 2021-02-16 DIAGNOSIS — Z95.0 CARDIAC PACEMAKER IN SITU: ICD-10-CM

## 2021-02-16 PROCEDURE — 93294 REM INTERROG EVL PM/LDLS PM: CPT | Performed by: INTERNAL MEDICINE

## 2021-02-16 PROCEDURE — 93296 REM INTERROG EVL PM/IDS: CPT | Performed by: INTERNAL MEDICINE

## 2021-02-17 ENCOUNTER — MYC MEDICAL ADVICE (OUTPATIENT)
Dept: ONCOLOGY | Facility: CLINIC | Age: 86
End: 2021-02-17

## 2021-02-17 DIAGNOSIS — C90.00 MULTIPLE MYELOMA NOT HAVING ACHIEVED REMISSION (H): ICD-10-CM

## 2021-02-17 RX ORDER — ACYCLOVIR 400 MG/1
400 TABLET ORAL 2 TIMES DAILY
Qty: 60 TABLET | Refills: 5 | OUTPATIENT
Start: 2021-02-17

## 2021-02-17 RX ORDER — ACYCLOVIR 400 MG/1
400 TABLET ORAL 2 TIMES DAILY
Qty: 60 TABLET | Refills: 5 | Status: SHIPPED | OUTPATIENT
Start: 2021-02-17 | End: 2021-09-22

## 2021-02-22 LAB
MDC_IDC_EPISODE_DTM: NORMAL
MDC_IDC_EPISODE_DTM: NORMAL
MDC_IDC_EPISODE_DURATION: 1220 S
MDC_IDC_EPISODE_DURATION: 388 S
MDC_IDC_EPISODE_ID: 34
MDC_IDC_EPISODE_ID: 35
MDC_IDC_EPISODE_TYPE: NORMAL
MDC_IDC_EPISODE_TYPE: NORMAL
MDC_IDC_LEAD_IMPLANT_DT: NORMAL
MDC_IDC_LEAD_IMPLANT_DT: NORMAL
MDC_IDC_LEAD_LOCATION: NORMAL
MDC_IDC_LEAD_LOCATION: NORMAL
MDC_IDC_LEAD_LOCATION_DETAIL_1: NORMAL
MDC_IDC_LEAD_LOCATION_DETAIL_1: NORMAL
MDC_IDC_LEAD_MFG: NORMAL
MDC_IDC_LEAD_MFG: NORMAL
MDC_IDC_LEAD_MODEL: NORMAL
MDC_IDC_LEAD_MODEL: NORMAL
MDC_IDC_LEAD_POLARITY_TYPE: NORMAL
MDC_IDC_LEAD_POLARITY_TYPE: NORMAL
MDC_IDC_LEAD_SERIAL: NORMAL
MDC_IDC_LEAD_SERIAL: NORMAL
MDC_IDC_MSMT_BATTERY_DTM: NORMAL
MDC_IDC_MSMT_BATTERY_REMAINING_LONGEVITY: 76 MO
MDC_IDC_MSMT_BATTERY_RRT_TRIGGER: 2.83
MDC_IDC_MSMT_BATTERY_STATUS: NORMAL
MDC_IDC_MSMT_BATTERY_VOLTAGE: 3.01 V
MDC_IDC_MSMT_LEADCHNL_RA_IMPEDANCE_VALUE: 361 OHM
MDC_IDC_MSMT_LEADCHNL_RA_IMPEDANCE_VALUE: 380 OHM
MDC_IDC_MSMT_LEADCHNL_RA_PACING_THRESHOLD_AMPLITUDE: 0.38 V
MDC_IDC_MSMT_LEADCHNL_RA_PACING_THRESHOLD_PULSEWIDTH: 0.4 MS
MDC_IDC_MSMT_LEADCHNL_RA_SENSING_INTR_AMPL: 3.12 MV
MDC_IDC_MSMT_LEADCHNL_RA_SENSING_INTR_AMPL: 3.12 MV
MDC_IDC_MSMT_LEADCHNL_RV_IMPEDANCE_VALUE: 418 OHM
MDC_IDC_MSMT_LEADCHNL_RV_IMPEDANCE_VALUE: 532 OHM
MDC_IDC_MSMT_LEADCHNL_RV_PACING_THRESHOLD_AMPLITUDE: 1.25 V
MDC_IDC_MSMT_LEADCHNL_RV_PACING_THRESHOLD_PULSEWIDTH: 0.4 MS
MDC_IDC_MSMT_LEADCHNL_RV_SENSING_INTR_AMPL: 3.38 MV
MDC_IDC_MSMT_LEADCHNL_RV_SENSING_INTR_AMPL: 3.38 MV
MDC_IDC_PG_IMPLANT_DTM: NORMAL
MDC_IDC_PG_MFG: NORMAL
MDC_IDC_PG_MODEL: NORMAL
MDC_IDC_PG_SERIAL: NORMAL
MDC_IDC_PG_TYPE: NORMAL
MDC_IDC_SESS_CLINIC_NAME: NORMAL
MDC_IDC_SESS_DTM: NORMAL
MDC_IDC_SESS_TYPE: NORMAL
MDC_IDC_SET_BRADY_AT_MODE_SWITCH_RATE: 171 {BEATS}/MIN
MDC_IDC_SET_BRADY_HYSTRATE: NORMAL
MDC_IDC_SET_BRADY_LOWRATE: 60 {BEATS}/MIN
MDC_IDC_SET_BRADY_MAX_SENSOR_RATE: 120 {BEATS}/MIN
MDC_IDC_SET_BRADY_MAX_TRACKING_RATE: 120 {BEATS}/MIN
MDC_IDC_SET_BRADY_MODE: NORMAL
MDC_IDC_SET_BRADY_PAV_DELAY_LOW: 300 MS
MDC_IDC_SET_BRADY_SAV_DELAY_LOW: 300 MS
MDC_IDC_SET_LEADCHNL_RA_PACING_AMPLITUDE: 1.5 V
MDC_IDC_SET_LEADCHNL_RA_PACING_ANODE_ELECTRODE_1: NORMAL
MDC_IDC_SET_LEADCHNL_RA_PACING_ANODE_LOCATION_1: NORMAL
MDC_IDC_SET_LEADCHNL_RA_PACING_CAPTURE_MODE: NORMAL
MDC_IDC_SET_LEADCHNL_RA_PACING_CATHODE_ELECTRODE_1: NORMAL
MDC_IDC_SET_LEADCHNL_RA_PACING_CATHODE_LOCATION_1: NORMAL
MDC_IDC_SET_LEADCHNL_RA_PACING_POLARITY: NORMAL
MDC_IDC_SET_LEADCHNL_RA_PACING_PULSEWIDTH: 0.4 MS
MDC_IDC_SET_LEADCHNL_RA_SENSING_ANODE_ELECTRODE_1: NORMAL
MDC_IDC_SET_LEADCHNL_RA_SENSING_ANODE_LOCATION_1: NORMAL
MDC_IDC_SET_LEADCHNL_RA_SENSING_CATHODE_ELECTRODE_1: NORMAL
MDC_IDC_SET_LEADCHNL_RA_SENSING_CATHODE_LOCATION_1: NORMAL
MDC_IDC_SET_LEADCHNL_RA_SENSING_POLARITY: NORMAL
MDC_IDC_SET_LEADCHNL_RA_SENSING_SENSITIVITY: 0.3 MV
MDC_IDC_SET_LEADCHNL_RV_PACING_AMPLITUDE: 3 V
MDC_IDC_SET_LEADCHNL_RV_PACING_ANODE_ELECTRODE_1: NORMAL
MDC_IDC_SET_LEADCHNL_RV_PACING_ANODE_LOCATION_1: NORMAL
MDC_IDC_SET_LEADCHNL_RV_PACING_CAPTURE_MODE: NORMAL
MDC_IDC_SET_LEADCHNL_RV_PACING_CATHODE_ELECTRODE_1: NORMAL
MDC_IDC_SET_LEADCHNL_RV_PACING_CATHODE_LOCATION_1: NORMAL
MDC_IDC_SET_LEADCHNL_RV_PACING_POLARITY: NORMAL
MDC_IDC_SET_LEADCHNL_RV_PACING_PULSEWIDTH: 0.4 MS
MDC_IDC_SET_LEADCHNL_RV_SENSING_ANODE_ELECTRODE_1: NORMAL
MDC_IDC_SET_LEADCHNL_RV_SENSING_ANODE_LOCATION_1: NORMAL
MDC_IDC_SET_LEADCHNL_RV_SENSING_CATHODE_ELECTRODE_1: NORMAL
MDC_IDC_SET_LEADCHNL_RV_SENSING_CATHODE_LOCATION_1: NORMAL
MDC_IDC_SET_LEADCHNL_RV_SENSING_POLARITY: NORMAL
MDC_IDC_SET_LEADCHNL_RV_SENSING_SENSITIVITY: 0.9 MV
MDC_IDC_SET_ZONE_DETECTION_INTERVAL: 350 MS
MDC_IDC_SET_ZONE_DETECTION_INTERVAL: 400 MS
MDC_IDC_SET_ZONE_TYPE: NORMAL
MDC_IDC_STAT_AT_BURDEN_PERCENT: 0 %
MDC_IDC_STAT_AT_DTM_END: NORMAL
MDC_IDC_STAT_AT_DTM_START: NORMAL
MDC_IDC_STAT_BRADY_AP_VP_PERCENT: 5.15 %
MDC_IDC_STAT_BRADY_AP_VS_PERCENT: 67.28 %
MDC_IDC_STAT_BRADY_AS_VP_PERCENT: 2.22 %
MDC_IDC_STAT_BRADY_AS_VS_PERCENT: 25.35 %
MDC_IDC_STAT_BRADY_DTM_END: NORMAL
MDC_IDC_STAT_BRADY_DTM_START: NORMAL
MDC_IDC_STAT_BRADY_RA_PERCENT_PACED: 71.89 %
MDC_IDC_STAT_BRADY_RV_PERCENT_PACED: 7.52 %
MDC_IDC_STAT_EPISODE_RECENT_COUNT: 0
MDC_IDC_STAT_EPISODE_RECENT_COUNT: 2
MDC_IDC_STAT_EPISODE_RECENT_COUNT_DTM_END: NORMAL
MDC_IDC_STAT_EPISODE_RECENT_COUNT_DTM_START: NORMAL
MDC_IDC_STAT_EPISODE_TOTAL_COUNT: 0
MDC_IDC_STAT_EPISODE_TOTAL_COUNT: 0
MDC_IDC_STAT_EPISODE_TOTAL_COUNT: 11
MDC_IDC_STAT_EPISODE_TOTAL_COUNT: 21
MDC_IDC_STAT_EPISODE_TOTAL_COUNT_DTM_END: NORMAL
MDC_IDC_STAT_EPISODE_TOTAL_COUNT_DTM_START: NORMAL
MDC_IDC_STAT_EPISODE_TYPE: NORMAL

## 2021-03-01 ENCOUNTER — TELEPHONE (OUTPATIENT)
Dept: PHARMACY | Facility: CLINIC | Age: 86
End: 2021-03-01

## 2021-03-01 DIAGNOSIS — C90.00 MULTIPLE MYELOMA NOT HAVING ACHIEVED REMISSION (H): Primary | ICD-10-CM

## 2021-03-01 RX ORDER — DEXAMETHASONE 4 MG/1
4 TABLET ORAL WEEKLY
Qty: 15 TABLET | Refills: 0 | Status: SHIPPED | OUTPATIENT
Start: 2021-03-01 | End: 2021-06-07

## 2021-03-01 RX ORDER — LENALIDOMIDE 10 MG/1
10 CAPSULE ORAL DAILY
Qty: 14 CAPSULE | Refills: 0 | Status: SHIPPED | OUTPATIENT
Start: 2021-03-01 | End: 2021-07-05

## 2021-03-01 NOTE — TELEPHONE ENCOUNTER
Oral Chemotherapy Monitoring Program   Medication: Revlimid  Rx: 10mg PO daily on days 1 through 14 of 21 day cycle   Auth #: [id number]    Obtained on: 03/01/2021   Risk Category: adult female of non child bearing potential  Routine survey questions reviewed.   Rx to be Escribed to [facility]    Terrie Bowen [facility] Oncology Pharmacy Liaison  [phone]

## 2021-03-09 ENCOUNTER — HOSPITAL ENCOUNTER (OUTPATIENT)
Facility: CLINIC | Age: 86
Setting detail: SPECIMEN
Discharge: HOME | End: 2021-03-09
Attending: INTERNAL MEDICINE | Admitting: INTERNAL MEDICINE
Payer: MEDICARE

## 2021-03-09 ENCOUNTER — DOCUMENTATION ONLY (OUTPATIENT)
Dept: PHARMACY | Facility: CLINIC | Age: 86
End: 2021-03-09

## 2021-03-09 DIAGNOSIS — C90.00 MULTIPLE MYELOMA NOT HAVING ACHIEVED REMISSION (H): ICD-10-CM

## 2021-03-09 LAB
ALBUMIN SERPL-MCNC: 3.6 G/DL (ref 3.4–5)
ALP SERPL-CCNC: 78 U/L (ref 40–150)
ALT SERPL W P-5'-P-CCNC: 23 U/L (ref 0–50)
ANION GAP SERPL CALCULATED.3IONS-SCNC: 4 MMOL/L (ref 3–14)
AST SERPL W P-5'-P-CCNC: 12 U/L (ref 0–45)
BASOPHILS # BLD AUTO: 0.1 10E9/L (ref 0–0.2)
BASOPHILS NFR BLD AUTO: 1 %
BILIRUB SERPL-MCNC: 0.9 MG/DL (ref 0.2–1.3)
BUN SERPL-MCNC: 26 MG/DL (ref 7–30)
CALCIUM SERPL-MCNC: 8.8 MG/DL (ref 8.5–10.1)
CHLORIDE SERPL-SCNC: 108 MMOL/L (ref 94–109)
CO2 SERPL-SCNC: 29 MMOL/L (ref 20–32)
CREAT SERPL-MCNC: 0.66 MG/DL (ref 0.52–1.04)
DIFFERENTIAL METHOD BLD: ABNORMAL
EOSINOPHIL # BLD AUTO: 0.1 10E9/L (ref 0–0.7)
EOSINOPHIL NFR BLD AUTO: 1 %
ERYTHROCYTE [DISTWIDTH] IN BLOOD BY AUTOMATED COUNT: 12.9 % (ref 10–15)
GFR SERPL CREATININE-BSD FRML MDRD: 81 ML/MIN/{1.73_M2}
GLUCOSE SERPL-MCNC: 116 MG/DL (ref 70–99)
HCT VFR BLD AUTO: 35.6 % (ref 35–47)
HGB BLD-MCNC: 11.9 G/DL (ref 11.7–15.7)
LYMPHOCYTES # BLD AUTO: 2.2 10E9/L (ref 0.8–5.3)
LYMPHOCYTES NFR BLD AUTO: 42 %
MCH RBC QN AUTO: 31.2 PG (ref 26.5–33)
MCHC RBC AUTO-ENTMCNC: 33.4 G/DL (ref 31.5–36.5)
MCV RBC AUTO: 93 FL (ref 78–100)
MONOCYTES # BLD AUTO: 0.6 10E9/L (ref 0–1.3)
MONOCYTES NFR BLD AUTO: 11 %
NEUTROPHILS # BLD AUTO: 2.4 10E9/L (ref 1.6–8.3)
NEUTROPHILS NFR BLD AUTO: 45 %
PLATELET # BLD AUTO: 135 10E9/L (ref 150–450)
PLATELET # BLD EST: ABNORMAL 10*3/UL
POTASSIUM SERPL-SCNC: 3.5 MMOL/L (ref 3.4–5.3)
PROT SERPL-MCNC: 6.7 G/DL (ref 6.8–8.8)
RBC # BLD AUTO: 3.81 10E12/L (ref 3.8–5.2)
RBC MORPH BLD: ABNORMAL
SODIUM SERPL-SCNC: 141 MMOL/L (ref 133–144)
WBC # BLD AUTO: 5.3 10E9/L (ref 4–11)

## 2021-03-09 PROCEDURE — 85025 COMPLETE CBC W/AUTO DIFF WBC: CPT | Performed by: INTERNAL MEDICINE

## 2021-03-09 PROCEDURE — 80053 COMPREHEN METABOLIC PANEL: CPT | Performed by: INTERNAL MEDICINE

## 2021-03-09 PROCEDURE — 36415 COLL VENOUS BLD VENIPUNCTURE: CPT

## 2021-03-09 NOTE — PROGRESS NOTES
Medical Assistant Note:  Quiana Dunaway presents today for blood draw.    Patient seen by provider today: No.   present during visit today: Not Applicable.    Concerns: No Concerns.    Procedure:  Lab draw site: left hand, Needle type: bf, Gauge: 23.    Post Assessment:  Labs drawn without difficulty: Yes.    Discharge Plan:  Departure Mode: Ambulatory.    Face to Face Time: 5 min  .    Mariela Lyn, CMA

## 2021-03-09 NOTE — PROGRESS NOTES
Oral Chemotherapy Monitoring Program  Lab Follow Up    Reviewed lab results from 03/09/21.    ORAL CHEMOTHERAPY 6/4/2019 10/27/2020 12/2/2020 1/6/2021 1/22/2021 2/1/2021 3/9/2021   Assessment Type - Quarterly Follow up Lab Monitoring Lab Monitoring - Lab Monitoring Lab Monitoring   Diagnosis Code - Multiple Myeloma Multiple Myeloma Multiple Myeloma Multiple Myeloma Multiple Myeloma Multiple Myeloma   Providers - Alex Parry   Clinic Name/Location - [facility]   Drug Name Revlimid (Lenalidomide) Revlimid (Lenalidomide) Revlimid (Lenalidomide) Revlimid (Lenalidomide) Revlimid (Lenalidomide) Revlimid (Lenalidomide) Revlimid (Lenalidomide)   Dose - 10 mg 10 mg 10 mg 10 mg 10 mg 10 mg   Current Schedule Daily Daily Daily Daily Daily Daily Daily   Cycle Details 2 weeks on 1 week off 2 weeks on, 1 week off 2 weeks on, 1 week off 2 weeks on, 1 week off 2 weeks on, 1 week off 2 weeks on, 1 week off 2 weeks on, 1 week off   Start Date of Last Cycle - 10/7/2020 - 12/9/2020 12/30/2020 1/28/2021 2/18/2021   Planned next cycle start date - 10/28/2020 - 1/7/2021 1/26/2021 2/18/2021 3/11/2021   Doses missed in last 2 weeks - 0 - - - - -   Adherence Assessment - Adherent - - - - -   Adverse Effects - No AE identified during assessment - - No AE identified during assessment - -   Fatigue - - - - - - -   Pharmacist Intervention(fatigue) - - - - - - -   Home BPs - Not applicable - - - - -   Any new drug interactions? No No - - No - -   Is the dose as ordered appropriate for the patient? Yes Yes - - Yes - -   Is the patient currently in pain? Assessed in last 30 days. No - - - - -   Has the patient been assessed within the past 6 months for depression? Yes No - - - - -   Has the patient missed any days of school, work, or other routine activity? - No - - - - -   Since the last time we talked, have you been hospitalized or used the emergency room? - No - -  - - -       Labs:  _  Result Component Current Result Ref Range   Sodium 141 (3/9/2021) 133 - 144 mmol/L     _  Result Component Current Result Ref Range   Potassium 3.5 (3/9/2021) 3.4 - 5.3 mmol/L     _  Result Component Current Result Ref Range   Calcium 8.8 (3/9/2021) 8.5 - 10.1 mg/dL     No results found for Mag within last 30 days.     No results found for Phos within last 30 days.     _  Result Component Current Result Ref Range   Albumin 3.6 (3/9/2021) 3.4 - 5.0 g/dL     _  Result Component Current Result Ref Range   Urea Nitrogen 26 (3/9/2021) 7 - 30 mg/dL     _  Result Component Current Result Ref Range   Creatinine 0.66 (3/9/2021) 0.52 - 1.04 mg/dL     _  Result Component Current Result Ref Range   AST 12 (3/9/2021) 0 - 45 U/L     _  Result Component Current Result Ref Range   ALT 23 (3/9/2021) 0 - 50 U/L     _  Result Component Current Result Ref Range   Bilirubin Total 0.9 (3/9/2021) 0.2 - 1.3 mg/dL     _  Result Component Current Result Ref Range   WBC 5.3 (3/9/2021) 4.0 - 11.0 10e9/L     _  Result Component Current Result Ref Range   Hemoglobin 11.9 (3/9/2021) 11.7 - 15.7 g/dL     _  Result Component Current Result Ref Range   Platelet Count 135 (L) (3/9/2021) 150 - 450 10e9/L     _  Result Component Current Result Ref Range   Absolute Neutrophil 2.4 (3/9/2021) 1.6 - 8.3 10e9/L       Assessment & Plan:  No concerning abnormalities. Ok to proceed with Revlimid.    Questions answered to patient's satisfaction.    Follow-Up:  Review visit with Dr. Parry on 3/16/21.    Rachel Moniz, Pharmacist Intern  March 09, 2021

## 2021-03-16 ENCOUNTER — ONCOLOGY VISIT (OUTPATIENT)
Dept: ONCOLOGY | Facility: CLINIC | Age: 86
End: 2021-03-16
Attending: INTERNAL MEDICINE
Payer: MEDICARE

## 2021-03-16 VITALS
RESPIRATION RATE: 14 BRPM | SYSTOLIC BLOOD PRESSURE: 125 MMHG | HEART RATE: 83 BPM | DIASTOLIC BLOOD PRESSURE: 80 MMHG | BODY MASS INDEX: 25.97 KG/M2 | WEIGHT: 142 LBS | OXYGEN SATURATION: 96 % | TEMPERATURE: 98.5 F

## 2021-03-16 DIAGNOSIS — C90.00 MULTIPLE MYELOMA NOT HAVING ACHIEVED REMISSION (H): Primary | ICD-10-CM

## 2021-03-16 PROCEDURE — G0463 HOSPITAL OUTPT CLINIC VISIT: HCPCS

## 2021-03-16 PROCEDURE — 99214 OFFICE O/P EST MOD 30 MIN: CPT | Performed by: INTERNAL MEDICINE

## 2021-03-16 RX ORDER — ZOLEDRONIC ACID 0.04 MG/ML
4 INJECTION, SOLUTION INTRAVENOUS ONCE
Status: CANCELLED | OUTPATIENT
Start: 2021-04-14 | End: 2021-03-24

## 2021-03-16 ASSESSMENT — PAIN SCALES - GENERAL: PAINLEVEL: NO PAIN (0)

## 2021-03-16 NOTE — LETTER
"    3/16/2021         RE: Quiana Dunaway  [address] [facility] [address]        Dear Colleague,    Thank you for referring your patient, Quiana Dunaway, to the [facility]CER [facility]. Please see a copy of my visit note below.    Oncology Rooming Note    March 16, 2021 10:34 AM   Quiana Dunaway is a 85 year old female who presents for:    Chief Complaint   Patient presents with     Oncology Clinic Visit     Initial Vitals: /80   Pulse 83   Temp 98.5  F (36.9  C) (Oral)   Resp 14   Wt 64.4 kg (142 lb)   SpO2 96%   BMI 25.97 kg/m   Estimated body mass index is 25.97 kg/m  as calculated from the following:    Height as of 1/14/21: 1.575 m (5' 2\").    Weight as of this encounter: 64.4 kg (142 lb). Body surface area is 1.68 meters squared.  No Pain (0) Comment: Data Unavailable   No LMP recorded. Patient is postmenopausal.  Allergies reviewed: Yes  Medications reviewed: Yes    Medications: Medication refills not needed today.  Pharmacy name entered into [facility]: [facility] - [place], [place] - [address]    Clinical concerns: cough, diarrhea BK was notified.      Shari J. Schoenberger, [facility]              Visit Date:   03/16/2021     HEMATOLOGY HISTORY: Mrs. Dunaway is a lady with multiple myeloma (IgG kappa). High risk, t(14;16).  1. On 05/01/2017, WBC of 3.4, hemoglobin of 10.2 and platelet of 154.  2. SPEP on 07/07/2017 revealed M-spike of 1.8.  3. Bone marrow biopsy on 07/12/2017 reveals kappa monotypic plasma cell population consistent with multiple myeloma.  Bone marrow is 70% cellular.  Plasma cell is 50-60%.     -There is gain of chromosome 1, 5, 9, 15, and 17.  There is translocation 14;16.   4.  On 07/18/2017:  -M-spike of 1.9.   -Immunofixation reveals monoclonal IgG kappa.    -IgG level of 2970.  IgA of 15 and IgM of 175.    -Kappa free light chain of 67.7.  Lambda free light chain of 1.42.  Ratio of kappa to lambda of 47.71.    -Beta 2 " microglobulin of 3.4.   5. PET scan on 07/24/2017 reveals several focal areas of increased FDG uptake consistent with multiple myeloma.  There is probable epithelial cyst in tail of the pancreas.  No associated abnormal FDG activity.  Left thyroid cysts or nodules without any FDG activity.  There is a 0.3 cm left upper lobe lung nodule.   8.  Velcade, Revlimid and dexamethasone between on 08/14/2017 and 04/30/2018. Velcade stopped.   -Revlimid and dexamethasone continued.     SUBJECTIVE:  Ms. Dunaway is an 85-year-old female with high-risk multiple myeloma.  She is on Revlimid and dexamethasone.  She is tolerating it well.  Myeloma is stable.      The patient gets cough when she lies down.  She does not get cough when she is sitting or standing.  This is secondary to reflux.      She has some fatigue, which would go along with her age.  No headache.  No dizziness.  No chest pain.  No nausea or vomiting.  Appetite has been fairly good.  No abnormal bleeding.  No fever, chills, night sweats.      All other review of systems negative.      PHYSICAL EXAMINATION:   GENERAL:  Alert and oriented x 3.   VITAL SIGNS:  Reviewed.  ECOG PS of 2.     EYES:  No icterus.   THROAT:  No ulcer. No thrush.   NECK:  Supple. No lymphadenopathy. No thyromegaly.   AXILLAE:  No lymphadenopathy.   LUNGS:  Good air entry bilaterally.  No crackles or wheezing.   HEART:  Regular.  No murmur.   GI: Abdomen is soft.  Nontender. No mass. Difficult palpation because of her weight.  EXTREMITIES:  No pedal edema.  No calf swelling or tenderness.   SKIN:  No rash.     LABORATORY DATA:  Reviewed.      ASSESSMENT:       1.  High-risk multiple myeloma on Revlimid and dexamethasone.   2.  Cough secondary to reflux.   3.  Gastroesophageal reflux disease.   4.  Fatigue secondary to her age and myeloma.      PLAN:   1.  I discussed regarding myeloma.  I explained to her that myeloma is stable.  She is on Revlimid and dexamethasone.  She is tolerating it  well.  That will be continued.  Side effects reviewed.   2.  The patient gets cough when she lies down.  This is due to reflux.  I advised her to start taking Prilosec once a day.  Also, she will try to sleep with her head up.   3.  She will be continued on Zometa every 3 months.  We will set it for next month.  She does not want this month.   4.  She has fatigue, which is secondary to her age and myeloma.  She is still pretty active and able to take care of herself.   5.  I will see her in 3 months' time.  She will see our nurse practitioner next month.  I advised her to call us with any questions or concerns.  She will continue on aspirin and acyclovir.         YVES PEREZ MD             D: 2021   T: 2021   MT: SAUL      Name:     DUGLAS CASTILLO   MRN:      [id number]        Account:      [id number]   :      1935           Visit Date:   2021      Document: [id number]      This office note has been dictated.          Again, thank you for allowing me to participate in the care of your patient.        Sincerely,        Yves Perez MD

## 2021-03-16 NOTE — PROGRESS NOTES
"Oncology Rooming Note    March 16, 2021 10:34 AM   Quiana Dunaway is a 85 year old female who presents for:    Chief Complaint   Patient presents with     Oncology Clinic Visit     Initial Vitals: /80   Pulse 83   Temp 98.5  F (36.9  C) (Oral)   Resp 14   Wt 64.4 kg (142 lb)   SpO2 96%   BMI 25.97 kg/m   Estimated body mass index is 25.97 kg/m  as calculated from the following:    Height as of 1/14/21: 1.575 m (5' 2\").    Weight as of this encounter: 64.4 kg (142 lb). Body surface area is 1.68 meters squared.  No Pain (0) Comment: Data Unavailable   No LMP recorded. Patient is postmenopausal.  Allergies reviewed: Yes  Medications reviewed: Yes    Medications: Medication refills not needed today.  Pharmacy name entered into [employer]: [facility] - [place], [place] - [address]    Clinical concerns: cough, diarrhea BK was notified.      Shari J. Schoenberger, DYANA            "

## 2021-03-16 NOTE — PATIENT INSTRUCTIONS
1. Continue revlimid and dexamethasone.  2. Continue aspirin and acyclovir.  3. See Juaquin Hammond in a month with labs.  -Zometa on same day.  4. See me in 3 months with labs.  5. Take prilosec once a day.

## 2021-03-22 NOTE — PROGRESS NOTES
Visit Date:   03/16/2021     HEMATOLOGY HISTORY: Mrs. Dunaway is a lady with multiple myeloma (IgG kappa). High risk, t(14;16).  1. On 05/01/2017, WBC of 3.4, hemoglobin of 10.2 and platelet of 154.  2. SPEP on 07/07/2017 revealed M-spike of 1.8.  3. Bone marrow biopsy on 07/12/2017 reveals kappa monotypic plasma cell population consistent with multiple myeloma.  Bone marrow is 70% cellular.  Plasma cell is 50-60%.     -There is gain of chromosome 1, 5, 9, 15, and 17.  There is translocation 14;16.   4.  On 07/18/2017:  -M-spike of 1.9.   -Immunofixation reveals monoclonal IgG kappa.    -IgG level of 2970.  IgA of 15 and IgM of 175.    -Kappa free light chain of 67.7.  Lambda free light chain of 1.42.  Ratio of kappa to lambda of 47.71.    -Beta 2 microglobulin of 3.4.   5. PET scan on 07/24/2017 reveals several focal areas of increased FDG uptake consistent with multiple myeloma.  There is probable epithelial cyst in tail of the pancreas.  No associated abnormal FDG activity.  Left thyroid cysts or nodules without any FDG activity.  There is a 0.3 cm left upper lobe lung nodule.   8.  Velcade, Revlimid and dexamethasone between on 08/14/2017 and 04/30/2018. Velcade stopped.   -Revlimid and dexamethasone continued.     SUBJECTIVE:  Ms. Dunaway is an 85-year-old female with high-risk multiple myeloma.  She is on Revlimid and dexamethasone.  She is tolerating it well.  Myeloma is stable.      The patient gets cough when she lies down.  She does not get cough when she is sitting or standing.  This is secondary to reflux.      She has some fatigue, which would go along with her age.  No headache.  No dizziness.  No chest pain.  No nausea or vomiting.  Appetite has been fairly good.  No abnormal bleeding.  No fever, chills, night sweats.      All other review of systems negative.      PHYSICAL EXAMINATION:   GENERAL:  Alert and oriented x 3.   VITAL SIGNS:  Reviewed.  ECOG PS of 2.     EYES:  No icterus.   THROAT:  No  ulcer. No thrush.   NECK:  Supple. No lymphadenopathy. No thyromegaly.   AXILLAE:  No lymphadenopathy.   LUNGS:  Good air entry bilaterally.  No crackles or wheezing.   HEART:  Regular.  No murmur.   GI: Abdomen is soft.  Nontender. No mass. Difficult palpation because of her weight.  EXTREMITIES:  No pedal edema.  No calf swelling or tenderness.   SKIN:  No rash.     LABORATORY DATA:  Reviewed.      ASSESSMENT:       1.  High-risk multiple myeloma on Revlimid and dexamethasone.   2.  Cough secondary to reflux.   3.  Gastroesophageal reflux disease.   4.  Fatigue secondary to her age and myeloma.      PLAN:   1.  I discussed regarding myeloma.  I explained to her that myeloma is stable.  She is on Revlimid and dexamethasone.  She is tolerating it well.  That will be continued.  Side effects reviewed.   2.  The patient gets cough when she lies down.  This is due to reflux.  I advised her to start taking Prilosec once a day.  Also, she will try to sleep with her head up.   3.  She will be continued on Zometa every 3 months.  We will set it for next month.  She does not want this month.   4.  She has fatigue, which is secondary to her age and myeloma.  She is still pretty active and able to take care of herself.   5.  I will see her in 3 months' time.  She will see our nurse practitioner next month.  I advised her to call us with any questions or concerns.  She will continue on aspirin and acyclovir.         YVES PEREZ MD             D: 2021   T: 2021   MT: SAUL      Name:     DUGLAS CASTILLO   MRN:      [id number]        Account:      [id number]   :      1935           Visit Date:   2021      Document: [id number]

## 2021-03-24 DIAGNOSIS — C90.00 MULTIPLE MYELOMA NOT HAVING ACHIEVED REMISSION (H): Primary | ICD-10-CM

## 2021-03-24 RX ORDER — LENALIDOMIDE 10 MG/1
10 CAPSULE ORAL DAILY
Qty: 14 CAPSULE | Refills: 0 | Status: SHIPPED | OUTPATIENT
Start: 2021-03-24 | End: 2021-08-25

## 2021-03-24 RX ORDER — DEXAMETHASONE 4 MG/1
4 TABLET ORAL WEEKLY
Qty: 15 TABLET | Refills: 0 | Status: SHIPPED | OUTPATIENT
Start: 2021-03-24 | End: 2021-06-07

## 2021-03-25 ENCOUNTER — TELEPHONE (OUTPATIENT)
Dept: PHARMACY | Facility: CLINIC | Age: 86
End: 2021-03-25

## 2021-03-25 NOTE — TELEPHONE ENCOUNTER
Oral Chemotherapy Monitoring Program   Medication: Revlimid  Rx: 10mg PO daily on days 1 through 14 of 21 day cycle   Auth #: [id number]  Obtained on: 3/25/2021   Risk Category: adult female of non child bearing potential  Routine survey questions reviewed.   Rx to be Escribed to [facility]    Terrie Bowen [facility] Oncology Pharmacy Liaison  [phone]

## 2021-03-28 NOTE — PROGRESS NOTES
Above noted. Elevated BUN and creatinine noted. Consider MR angiogram of brain without contrast when patient is stable for transfer to radiology suite.   In addition to the MR angiogram without contrast she should have a plain MRI of brain again when safe to travel which would be deferred to intensivist. Oral Chemotherapy Monitoring Program.    Patient currently on lenalidomide therapy.    Called to discuss lab results from 123/19.  No concerning abnormalities.    Confirmed with patient that she started this cycle on 12/1/19.    Questions answered to patient's satisfaction.    Will follow up in 4-6 weeks with repeat labs.    Of note: patient requested refills on acyclovir, prochlorperazine, dexamethasone, myrbetric, and lorazepam. All were sent to [facility]. Lorazepam ok to call in per Dr. Parry with 5 refills.    Debra Ortega PharmD  December 3, 2019

## 2021-04-09 ENCOUNTER — VIRTUAL VISIT (OUTPATIENT)
Dept: FAMILY MEDICINE | Facility: CLINIC | Age: 86
End: 2021-04-09
Payer: MEDICARE

## 2021-04-09 DIAGNOSIS — J06.9 UPPER RESPIRATORY TRACT INFECTION, UNSPECIFIED TYPE: Primary | ICD-10-CM

## 2021-04-09 DIAGNOSIS — J06.9 UPPER RESPIRATORY TRACT INFECTION, UNSPECIFIED TYPE: ICD-10-CM

## 2021-04-09 PROCEDURE — 99213 OFFICE O/P EST LOW 20 MIN: CPT | Mod: 95 | Performed by: INTERNAL MEDICINE

## 2021-04-09 PROCEDURE — U0005 INFEC AGEN DETEC AMPLI PROBE: HCPCS | Performed by: INTERNAL MEDICINE

## 2021-04-09 PROCEDURE — U0003 INFECTIOUS AGENT DETECTION BY NUCLEIC ACID (DNA OR RNA); SEVERE ACUTE RESPIRATORY SYNDROME CORONAVIRUS 2 (SARS-COV-2) (CORONAVIRUS DISEASE [COVID-19]), AMPLIFIED PROBE TECHNIQUE, MAKING USE OF HIGH THROUGHPUT TECHNOLOGIES AS DESCRIBED BY CMS-2020-01-R: HCPCS | Performed by: INTERNAL MEDICINE

## 2021-04-09 NOTE — PROGRESS NOTES
"Quiana is a 85 year old who is being evaluated via a billable video visit.      How would you like to obtain your AVS? MyChart  If the video visit is dropped, the invitation should be resent by: Text to cell phone: [phone]   Will anyone else be joining your video visit? Yes: Regi. How would they like to receive their invitation? Text to cell phone: on same call    Video Start Time: 10:46    Assessment & Plan     Upper respiratory tract infection, unspecified type  She has a URI  I think that the first order of business would be to exclude Covid as the possible etiology  If the Covid test is negative, and symptoms are persistent, a chest x-ray would be helpful to further evaluate particularly given her underlying health conditions  She certainly does not look toxic by video exam  We discussed the role of antibiotics  We discussed that the vast majority of these problems are caused by viral etiologies and that antibiotics might not be that helpful and could cause side effects such as worsening diarrhea  After that discussion, she did not wish to pursue empiric antibiotic therapy  We discussed that we are going into the weekend and as we are waiting for the Covid test results return and whether to pursue the chest x-ray, if symptoms take a significant turn for the worse such as high fevers, worsening shortness of breath then I would advise an emergency department evaluation        20 minutes spent on the date of the encounter doing chart review, history and exam, documentation and further activities per the note       BMI:   Estimated body mass index is 25.97 kg/m  as calculated from the following:    Height as of 1/14/21: 1.575 m (5' 2\").    Weight as of 3/16/21: 64.4 kg (142 lb).           Return today (on 4/9/2021) for Please arrange for Covid test as soon as possible.    César Chung MD  [facility]    Dirk Araya is a 85 year old who presents for the following health issues "     HPI     She had large amounts of family visit her for the Passover holiday about 1 week ago.  Since then, she has had URI symptoms including cough, runny nose and more recently diffuse muscle aches.  She denies fevers.  She denies any change in chronic shortness of breath.  The cough is sometimes productive of yellow sputum.  She has been using cough drops to manage symptoms.  She thinks that the cough is actually been improving over the last week, but she is surprised that it last this long.      Review of Systems         Objective           Vitals:  No vitals were obtained today due to virtual visit.    Physical Exam   GENERAL: Healthy, alert and no distress  EYES: Eyes grossly normal to inspection.  No discharge or erythema, or obvious scleral/conjunctival abnormalities.  RESP: No audible wheeze, cough, or visible cyanosis.  No visible retractions or increased work of breathing.    SKIN: Visible skin clear. No significant rash, abnormal pigmentation or lesions.  NEURO: Cranial nerves grossly intact.  Mentation and speech appropriate for age.  PSYCH: Mentation appears normal, affect normal/bright, judgement and insight intact, normal speech and appearance well-groomed.                Video-Visit Details    Type of service:  Video Visit    Video End Time:10:56 AM    Originating Location (pt. Location): Home    Distant Location (provider location):  [facility]     Platform used for Video Visit: [employer]

## 2021-04-10 LAB
SARS-COV-2 RNA RESP QL NAA+PROBE: NOT DETECTED
SPECIMEN SOURCE: NORMAL

## 2021-04-12 ENCOUNTER — HOSPITAL ENCOUNTER (OUTPATIENT)
Facility: CLINIC | Age: 86
Setting detail: SPECIMEN
Discharge: HOME | End: 2021-04-12
Attending: INTERNAL MEDICINE | Admitting: INTERNAL MEDICINE
Payer: MEDICARE

## 2021-04-12 ENCOUNTER — DOCUMENTATION ONLY (OUTPATIENT)
Dept: PHARMACY | Facility: CLINIC | Age: 86
End: 2021-04-12

## 2021-04-12 ENCOUNTER — INFUSION THERAPY VISIT (OUTPATIENT)
Dept: INFUSION THERAPY | Facility: CLINIC | Age: 86
End: 2021-04-12
Attending: INTERNAL MEDICINE
Payer: MEDICARE

## 2021-04-12 DIAGNOSIS — C90.00 MULTIPLE MYELOMA NOT HAVING ACHIEVED REMISSION (H): ICD-10-CM

## 2021-04-12 LAB
ALBUMIN SERPL-MCNC: 3.2 G/DL (ref 3.4–5)
ALP SERPL-CCNC: 112 U/L (ref 40–150)
ALT SERPL W P-5'-P-CCNC: 44 U/L (ref 0–50)
ANION GAP SERPL CALCULATED.3IONS-SCNC: 4 MMOL/L (ref 3–14)
AST SERPL W P-5'-P-CCNC: 23 U/L (ref 0–45)
BASOPHILS # BLD AUTO: 0 10E9/L (ref 0–0.2)
BASOPHILS NFR BLD AUTO: 0.4 %
BILIRUB SERPL-MCNC: 0.6 MG/DL (ref 0.2–1.3)
BUN SERPL-MCNC: 15 MG/DL (ref 7–30)
CALCIUM SERPL-MCNC: 9.3 MG/DL (ref 8.5–10.1)
CHLORIDE SERPL-SCNC: 110 MMOL/L (ref 94–109)
CO2 SERPL-SCNC: 28 MMOL/L (ref 20–32)
CREAT SERPL-MCNC: 0.61 MG/DL (ref 0.52–1.04)
DIFFERENTIAL METHOD BLD: ABNORMAL
EOSINOPHIL # BLD AUTO: 0 10E9/L (ref 0–0.7)
EOSINOPHIL NFR BLD AUTO: 0.6 %
ERYTHROCYTE [DISTWIDTH] IN BLOOD BY AUTOMATED COUNT: 12.9 % (ref 10–15)
GFR SERPL CREATININE-BSD FRML MDRD: 83 ML/MIN/{1.73_M2}
GLUCOSE SERPL-MCNC: 120 MG/DL (ref 70–99)
HCT VFR BLD AUTO: 34.7 % (ref 35–47)
HGB BLD-MCNC: 11.5 G/DL (ref 11.7–15.7)
IMM GRANULOCYTES # BLD: 0 10E9/L (ref 0–0.4)
IMM GRANULOCYTES NFR BLD: 0.6 %
LYMPHOCYTES # BLD AUTO: 0.8 10E9/L (ref 0.8–5.3)
LYMPHOCYTES NFR BLD AUTO: 16.4 %
MCH RBC QN AUTO: 31.1 PG (ref 26.5–33)
MCHC RBC AUTO-ENTMCNC: 33.1 G/DL (ref 31.5–36.5)
MCV RBC AUTO: 94 FL (ref 78–100)
MONOCYTES # BLD AUTO: 0.2 10E9/L (ref 0–1.3)
MONOCYTES NFR BLD AUTO: 4.1 %
NEUTROPHILS # BLD AUTO: 3.8 10E9/L (ref 1.6–8.3)
NEUTROPHILS NFR BLD AUTO: 77.9 %
NRBC # BLD AUTO: 0 10*3/UL
NRBC BLD AUTO-RTO: 0 /100
PLATELET # BLD AUTO: 249 10E9/L (ref 150–450)
POTASSIUM SERPL-SCNC: 3.9 MMOL/L (ref 3.4–5.3)
PROT SERPL-MCNC: 7.2 G/DL (ref 6.8–8.8)
RBC # BLD AUTO: 3.7 10E12/L (ref 3.8–5.2)
SODIUM SERPL-SCNC: 142 MMOL/L (ref 133–144)
WBC # BLD AUTO: 4.9 10E9/L (ref 4–11)

## 2021-04-12 PROCEDURE — 85025 COMPLETE CBC W/AUTO DIFF WBC: CPT | Performed by: INTERNAL MEDICINE

## 2021-04-12 PROCEDURE — 80053 COMPREHEN METABOLIC PANEL: CPT | Performed by: INTERNAL MEDICINE

## 2021-04-12 PROCEDURE — 36415 COLL VENOUS BLD VENIPUNCTURE: CPT

## 2021-04-12 NOTE — PROGRESS NOTES
Oral Chemotherapy Monitoring Program  Lab Follow Up    Reviewed lab results from 04/12/21.    ORAL CHEMOTHERAPY 10/27/2020 12/2/2020 1/6/2021 1/22/2021 2/1/2021 3/9/2021 4/12/2021   Assessment Type Quarterly Follow up Lab Monitoring Lab Monitoring - Lab Monitoring Lab Monitoring Lab Monitoring   Diagnosis Code Multiple Myeloma Multiple Myeloma Multiple Myeloma Multiple Myeloma Multiple Myeloma Multiple Myeloma Multiple Myeloma   Providers Alex Parry   Clinic Name/Location [facility]   Drug Name Revlimid (Lenalidomide) Revlimid (Lenalidomide) Revlimid (Lenalidomide) Revlimid (Lenalidomide) Revlimid (Lenalidomide) Revlimid (Lenalidomide) Revlimid (lenalidomide)   Dose 10 mg 10 mg 10 mg 10 mg 10 mg 10 mg 10 mg   Current Schedule Daily Daily Daily Daily Daily Daily Daily   Cycle Details 2 weeks on, 1 week off 2 weeks on, 1 week off 2 weeks on, 1 week off 2 weeks on, 1 week off 2 weeks on, 1 week off 2 weeks on, 1 week off 2 weeks on, 1 week off   Start Date of Last Cycle 10/7/2020 - 12/9/2020 12/30/2020 1/28/2021 2/18/2021 4/1/2021   Planned next cycle start date 10/28/2020 - 1/7/2021 1/26/2021 2/18/2021 3/11/2021 4/22/2021   Doses missed in last 2 weeks 0 - - - - - -   Adherence Assessment Adherent - - - - - -   Adverse Effects No AE identified during assessment - - No AE identified during assessment - - -   Fatigue - - - - - - -   Pharmacist Intervention(fatigue) - - - - - - -   Home BPs Not applicable - - - - - -   Any new drug interactions? No - - No - - -   Is the dose as ordered appropriate for the patient? Yes - - Yes - - -   Is the patient currently in pain? No - - - - - -   Has the patient been assessed within the past 6 months for depression? No - - - - - -   Has the patient missed any days of school, work, or other routine activity? No - - - - - -   Since the last time we talked, have you been hospitalized or  used the emergency room? No - - - - - -       Labs:  _  Result Component Current Result Ref Range   Sodium 142 (4/12/2021) 133 - 144 mmol/L     _  Result Component Current Result Ref Range   Potassium 3.9 (4/12/2021) 3.4 - 5.3 mmol/L     _  Result Component Current Result Ref Range   Calcium 9.3 (4/12/2021) 8.5 - 10.1 mg/dL     No results found for Mag within last 30 days.     No results found for Phos within last 30 days.     _  Result Component Current Result Ref Range   Albumin 3.2 (L) (4/12/2021) 3.4 - 5.0 g/dL     _  Result Component Current Result Ref Range   Urea Nitrogen 15 (4/12/2021) 7 - 30 mg/dL     _  Result Component Current Result Ref Range   Creatinine 0.61 (4/12/2021) 0.52 - 1.04 mg/dL     _  Result Component Current Result Ref Range   AST 23 (4/12/2021) 0 - 45 U/L     _  Result Component Current Result Ref Range   ALT 44 (4/12/2021) 0 - 50 U/L     _  Result Component Current Result Ref Range   Bilirubin Total 0.6 (4/12/2021) 0.2 - 1.3 mg/dL     _  Result Component Current Result Ref Range   WBC 4.9 (4/12/2021) 4.0 - 11.0 10e9/L     _  Result Component Current Result Ref Range   Hemoglobin 11.5 (L) (4/12/2021) 11.7 - 15.7 g/dL     _  Result Component Current Result Ref Range   Platelet Count 249 (4/12/2021) 150 - 450 10e9/L     _  Result Component Current Result Ref Range   Absolute Neutrophil 3.8 (4/12/2021) 1.6 - 8.3 10e9/L       Assessment & Plan:  No concerning abnormalities. Ok to proceed with Revlimid.    Questions answered to patient's satisfaction.    Follow-Up:  Appointment with Dr. Parry on 04/14/21.    Rachel Moniz, Pharmacist Intern  April 12, 2021

## 2021-04-12 NOTE — PROGRESS NOTES
Medical Assistant Note:  Quiana Dunaway presents today for lab draw  .    Patient seen by provider today: No.   present during visit today: Not Applicable.    Concerns: No Concerns.    Procedure:  Lab draw site: Right Forearm Draw, Needle type: BF, Gauge: 21. Gauze and coban applied    Post Assessment:  Labs drawn without difficulty: Yes.    Discharge Plan:  Departure Mode: Ambulatory.    Face to Face Time: 5.    Sultana Jones CMA

## 2021-04-12 NOTE — RESULT ENCOUNTER NOTE
The following letter pertains to your most recent diagnostic tests:      As we discussed by phone, the Covid test is negative.  If you are still coughing, a chest x-ray would be a logical next step.  Hopefully this can be done at your upcoming oncology visit.  If not, you can certainly schedule an appointment at our clinic to get a chest x-ray done.      Sincerely,    Dr. Chung

## 2021-04-14 ENCOUNTER — ONCOLOGY VISIT (OUTPATIENT)
Dept: ONCOLOGY | Facility: CLINIC | Age: 86
End: 2021-04-14
Attending: INTERNAL MEDICINE
Payer: MEDICARE

## 2021-04-14 ENCOUNTER — TELEPHONE (OUTPATIENT)
Dept: ONCOLOGY | Facility: CLINIC | Age: 86
End: 2021-04-14

## 2021-04-14 ENCOUNTER — HOSPITAL ENCOUNTER (OUTPATIENT)
Dept: GENERAL RADIOLOGY | Facility: CLINIC | Age: 86
Discharge: HOME | End: 2021-04-14
Attending: INTERNAL MEDICINE | Admitting: INTERNAL MEDICINE
Payer: MEDICARE

## 2021-04-14 ENCOUNTER — INFUSION THERAPY VISIT (OUTPATIENT)
Dept: INFUSION THERAPY | Facility: CLINIC | Age: 86
End: 2021-04-14
Attending: INTERNAL MEDICINE
Payer: MEDICARE

## 2021-04-14 VITALS
OXYGEN SATURATION: 95 % | BODY MASS INDEX: 26.12 KG/M2 | SYSTOLIC BLOOD PRESSURE: 133 MMHG | RESPIRATION RATE: 16 BRPM | WEIGHT: 142.8 LBS | DIASTOLIC BLOOD PRESSURE: 81 MMHG | HEART RATE: 87 BPM | TEMPERATURE: 98.9 F

## 2021-04-14 VITALS
DIASTOLIC BLOOD PRESSURE: 81 MMHG | HEART RATE: 87 BPM | SYSTOLIC BLOOD PRESSURE: 133 MMHG | TEMPERATURE: 99 F | RESPIRATION RATE: 16 BRPM | OXYGEN SATURATION: 95 %

## 2021-04-14 DIAGNOSIS — R06.02 SHORTNESS OF BREATH: ICD-10-CM

## 2021-04-14 DIAGNOSIS — C90.00 MULTIPLE MYELOMA NOT HAVING ACHIEVED REMISSION (H): Primary | ICD-10-CM

## 2021-04-14 DIAGNOSIS — C90.00 MULTIPLE MYELOMA NOT HAVING ACHIEVED REMISSION (H): ICD-10-CM

## 2021-04-14 DIAGNOSIS — J06.9 UPPER RESPIRATORY TRACT INFECTION, UNSPECIFIED TYPE: ICD-10-CM

## 2021-04-14 PROCEDURE — 96365 THER/PROPH/DIAG IV INF INIT: CPT

## 2021-04-14 PROCEDURE — G0463 HOSPITAL OUTPT CLINIC VISIT: HCPCS | Mod: 25

## 2021-04-14 PROCEDURE — 71046 X-RAY EXAM CHEST 2 VIEWS: CPT

## 2021-04-14 PROCEDURE — 258N000003 HC RX IP 258 OP 636: Performed by: INTERNAL MEDICINE

## 2021-04-14 PROCEDURE — 99214 OFFICE O/P EST MOD 30 MIN: CPT | Performed by: NURSE PRACTITIONER

## 2021-04-14 PROCEDURE — 250N000011 HC RX IP 250 OP 636: Performed by: INTERNAL MEDICINE

## 2021-04-14 RX ORDER — FUROSEMIDE 20 MG
20 TABLET ORAL DAILY
Qty: 90 TABLET | Refills: 3 | Status: SHIPPED | OUTPATIENT
Start: 2021-04-14 | End: 2021-06-01

## 2021-04-14 RX ORDER — LORAZEPAM 0.5 MG/1
0.5 TABLET ORAL EVERY 6 HOURS PRN
Qty: 30 TABLET | Refills: 3 | Status: SHIPPED | OUTPATIENT
Start: 2021-04-14 | End: 2021-09-20

## 2021-04-14 RX ORDER — ZOLEDRONIC ACID 0.04 MG/ML
4 INJECTION, SOLUTION INTRAVENOUS ONCE
Status: COMPLETED | OUTPATIENT
Start: 2021-04-14 | End: 2021-04-14

## 2021-04-14 RX ADMIN — ZOLEDRONIC ACID 4 MG: 0.04 INJECTION, SOLUTION INTRAVENOUS at 14:24

## 2021-04-14 RX ADMIN — SODIUM CHLORIDE 250 ML: 9 INJECTION, SOLUTION INTRAVENOUS at 14:24

## 2021-04-14 ASSESSMENT — PAIN SCALES - GENERAL: PAINLEVEL: NO PAIN (0)

## 2021-04-14 NOTE — PROGRESS NOTES
Oncology/Hematology Visit Note  Apr 14, 2021    Reason for Visit: follow up of multiple myeloma    Patient is currently taking Revlimid 10 mg 2 weeks on 1 week off  Dexamethasone 4 mg once a week  And Zometa every 3 months    Interval History:  Patient reports she 2 weeks ago she had cough shortness of breath.  She was tested for Covid which was negative her primary wants her to do chest x-ray,  She reports her neighbor prescribed Z-Venancio for her which helped with the cough but she continues to have some shortness of breath.  Although she does report that she has had chronic shortness of breath for years  Patient denies chest pain denies fever chills sweats  Denies nausea vomiting diarrhea abdominal pain bleeding        Review of Systems:  14 point ROS of systems including Constitutional, Eyes, Respiratory, Cardiovascular, Gastroenterology, Genitourinary, Integumentary, Muscularskeletal, Psychiatric were all negative except for pertinent positives noted in my HPI.    Physical Examination:  /81   Pulse 87   Temp 98.9  F (37.2  C) (Oral)   Resp 16   Wt 64.8 kg (142 lb 12.8 oz)   SpO2 95%   BMI 26.12 kg/m      General: The patient is a pleasant female in no acute distress.  HEENT: EOMI, PERRL. Sclerae are anicteric. Oral mucosa is pink and moist with no lesions or thrush.   Lymph: Neck is supple with no lymphadenopathy in the cervical or supraclavicular areas.   Heart: Regular rate and rhythm.   Lungs: Clear to auscultation bilaterally.   GI: Bowel sounds present, soft, nontender with no palpable hepatosplenomegaly or masses.   Extremities: No lower extremity edema noted bilaterally.   Skin: No rashes, petechiae, or bruising noted on exposed skin.    Laboratory Data:  Results for DUGLAS CASTILLO (MRN [id number]) as of 4/14/2021 15:30   Ref. Range 4/12/2021 13:09   Sodium Latest Ref Range: 133 - 144 mmol/L 142   Potassium Latest Ref Range: 3.4 - 5.3 mmol/L 3.9   Chloride Latest Ref Range: 94 - 109 mmol/L  110 (H)   Carbon Dioxide Latest Ref Range: 20 - 32 mmol/L 28   Urea Nitrogen Latest Ref Range: 7 - 30 mg/dL 15   Creatinine Latest Ref Range: 0.52 - 1.04 mg/dL 0.61   GFR Estimate Latest Ref Range: >60 mL/min/1.73_m2 83   GFR Estimate If Black Latest Ref Range: >60 mL/min/1.73_m2 >90   Calcium Latest Ref Range: 8.5 - 10.1 mg/dL 9.3   Anion Gap Latest Ref Range: 3 - 14 mmol/L 4   Albumin Latest Ref Range: 3.4 - 5.0 g/dL 3.2 (L)   Protein Total Latest Ref Range: 6.8 - 8.8 g/dL 7.2   Bilirubin Total Latest Ref Range: 0.2 - 1.3 mg/dL 0.6   Alkaline Phosphatase Latest Ref Range: 40 - 150 U/L 112   ALT Latest Ref Range: 0 - 50 U/L 44   AST Latest Ref Range: 0 - 45 U/L 23   Glucose Latest Ref Range: 70 - 99 mg/dL 120 (H)   WBC Latest Ref Range: 4.0 - 11.0 10e9/L 4.9   Hemoglobin Latest Ref Range: 11.7 - 15.7 g/dL 11.5 (L)   Hematocrit Latest Ref Range: 35.0 - 47.0 % 34.7 (L)   Platelet Count Latest Ref Range: 150 - 450 10e9/L 249   RBC Count Latest Ref Range: 3.8 - 5.2 10e12/L 3.70 (L)   MCV Latest Ref Range: 78 - 100 fl 94   MCH Latest Ref Range: 26.5 - 33.0 pg 31.1   MCHC Latest Ref Range: 31.5 - 36.5 g/dL 33.1   RDW Latest Ref Range: 10.0 - 15.0 % 12.9   Diff Method Unknown Automated Method   % Neutrophils Latest Units: % 77.9   % Lymphocytes Latest Units: % 16.4   % Monocytes Latest Units: % 4.1   % Eosinophils Latest Units: % 0.6   % Basophils Latest Units: % 0.4   % Immature Granulocytes Latest Units: % 0.6   Nucleated RBCs Latest Ref Range: 0 /100 0   Absolute Neutrophil Latest Ref Range: 1.6 - 8.3 10e9/L 3.8   Absolute Lymphocytes Latest Ref Range: 0.8 - 5.3 10e9/L 0.8   Absolute Monocytes Latest Ref Range: 0.0 - 1.3 10e9/L 0.2   Absolute Eosinophils Latest Ref Range: 0.0 - 0.7 10e9/L 0.0   Absolute Basophils Latest Ref Range: 0.0 - 0.2 10e9/L 0.0   Abs Immature Granulocytes Latest Ref Range: 0 - 0.4 10e9/L 0.0   Absolute Nucleated RBC Unknown 0.0         Assessment and Plan:      This is a 85-year-old female  with    multiple myeloma  Currently getting treatment with Revlimid 10 mg p.o. 14 days on 7 days off dexamethasone 4 mg a week  Light chains are slowly rising up  Per Dr. Parry continue with the same treatment for now  Continue with monthly labs  Patient has follow-up appointment with Dr. Parry in 2 months        Bone health  Patient has been tolerating Zometa well continue with Zometa every 3 months      Shortness of breath /cough  Reports improvement in symptoms since the start of Z-Venancio  Covid negative  Chest x-ray per PCP   Patient is getting Revlimid for multiple myeloma treatment which can cause blood clots  Order CT of the chest to rule out PE  Patient is not in any acute distress  I advised patient to go to ER immediately in the event of worsening of shortness of breath chest pain cough or any changes in health condition        MARIUSZ Mckeon [facility]     Chart documentation with Dragon Voice recognition Software. Although reviewed after completion, some words and grammatical errors may remain.

## 2021-04-14 NOTE — LETTER
April 15, 2021      Quiana Dunaway  [address] [facility] [address]        Dear ,    We are writing to inform you of your test results.    The following letter pertains to your most recent diagnostic tests:     Good news!  The x-ray does not show evidence for pneumonia.  I think that this is just a bad cold (viral upper respiratory infection that is not Covid).  Hopefully, symptoms will start to improve soon.  Again, as previously mentioned, that cough can unfortunately often take several weeks if not months to resolve.         Sincerely,     Dr. Chung     Resulted Orders   XR Chest 2 Views    Narrative    XR CHEST 2 VW  4/14/2021 4:10 PM       INDICATION: Cough  COMPARISON: 5/24/2020       Impression    IMPRESSION: Stable cardiac pacemaker. Stable minimal scarring or  atelectasis at the left lung base. Lungs are otherwise clear. No acute  findings.    RACQUEL AGUILERA MD       If you have any questions or concerns, please call the clinic at the number listed above.       Sincerely,      César Chung MD

## 2021-04-14 NOTE — RESULT ENCOUNTER NOTE
The following letter pertains to your most recent diagnostic tests:    Good news!  The x-ray does not show evidence for pneumonia.  I think that this is just a bad cold (viral upper respiratory infection that is not Covid).  Hopefully, symptoms will start to improve soon.  Again, as previously mentioned, that cough can unfortunately often take several weeks if not months to resolve.        Sincerely,    Dr. Chung

## 2021-04-14 NOTE — TELEPHONE ENCOUNTER
Marjan helped schedule Chest X-ray today per discharge with Juaquin Hammond NP.  Patient wanted to wait on CT and will schedule that after today's chest x-ray.

## 2021-04-14 NOTE — PROGRESS NOTES
Infusion Nursing Note:  Quiana Dunaway presents today for Zometa.    Patient seen by provider today: Yes: To see Juaquin Hammond NP following infusion.   present during visit today: Not Applicable.    Note: Patient reports to struggling with a cough and some associated sob over the past few weeks.  Has been in contact with her primary MD regarding this and tested negative for COVID last Friday.  Is scheduled to be seen by Juqauin Hammond NP today following Zometa infusion.    Patient did not meet criteria for an asymptomatic covid-19 PCR test in infusion today. Last COVID swab on 4/9/21.    Intravenous Access:  Peripheral IV placed.    Treatment Conditions:  Lab Results   Component Value Date    HGB 11.5 04/12/2021     Lab Results   Component Value Date    WBC 4.9 04/12/2021      Lab Results   Component Value Date    ANEU 3.8 04/12/2021     Lab Results   Component Value Date     04/12/2021      Lab Results   Component Value Date     04/12/2021                   Lab Results   Component Value Date    POTASSIUM 3.9 04/12/2021           Lab Results   Component Value Date    MAG 2.4 05/26/2020            Lab Results   Component Value Date    CR 0.61 04/12/2021                   Lab Results   Component Value Date    HUMBERTO 9.3 04/12/2021                Lab Results   Component Value Date    BILITOTAL 0.6 04/12/2021           Lab Results   Component Value Date    ALBUMIN 3.2 04/12/2021                    Lab Results   Component Value Date    ALT 44 04/12/2021           Lab Results   Component Value Date    AST 23 04/12/2021       Results reviewed, labs MET treatment parameters, ok to proceed with treatment.      Post Infusion Assessment:  Patient tolerated infusion without incident.  Blood return noted pre and post infusion.  Site patent and intact, free from redness, edema or discomfort.  No evidence of extravasations.  Access discontinued per protocol.       Discharge Plan:   Patient declined prescription  refills.  Discharge instructions reviewed with: Patient.  Patient and/or family verbalized understanding of discharge instructions and all questions answered.  AVS to patient via [employer].  Patient will return 5/5/21 for next appointment.   Patient discharged in stable condition accompanied by: self.  Departure Mode: Ambulatory.    Michelle Quintana RN

## 2021-04-14 NOTE — PROGRESS NOTES
"Oncology Rooming Note    April 14, 2021 2:49 PM   Quiana Dunaway is a 85 year old female who presents for:    Chief Complaint   Patient presents with     Oncology Clinic Visit     Initial Vitals: /81   Pulse 87   Temp 99  F (37.2  C) (Oral)   Resp 16   SpO2 95%  Estimated body mass index is 25.97 kg/m  as calculated from the following:    Height as of 1/14/21: 1.575 m (5' 2\").    Weight as of 3/16/21: 64.4 kg (142 lb). There is no height or weight on file to calculate BSA.  No Pain (0) Comment: Data Unavailable   No LMP recorded. Patient is postmenopausal.  Allergies reviewed: Yes  Medications reviewed: Yes    Medications: MEDICATION REFILLS NEEDED TODAY. Provider was notified.  Pharmacy name entered into [employer]:    [facility] - [place], [place] - [address]  [id number] - [place], [place] - [address] [facility] [address]    Clinical concerns: refills of Lorazepam? also Furosemide -     Lorazepam is not helping with sleep - up several times a night.      Shari J. Schoenberger, [facility]            "

## 2021-04-15 DIAGNOSIS — C90.00 MULTIPLE MYELOMA NOT HAVING ACHIEVED REMISSION (H): Primary | ICD-10-CM

## 2021-04-16 DIAGNOSIS — C90.00 MULTIPLE MYELOMA NOT HAVING ACHIEVED REMISSION (H): Primary | ICD-10-CM

## 2021-04-16 RX ORDER — LENALIDOMIDE 10 MG/1
10 CAPSULE ORAL DAILY
Qty: 14 CAPSULE | Refills: 0 | Status: SHIPPED | OUTPATIENT
Start: 2021-04-16 | End: 2021-08-25

## 2021-04-16 RX ORDER — DEXAMETHASONE 4 MG/1
4 TABLET ORAL WEEKLY
Qty: 15 TABLET | Refills: 0 | Status: SHIPPED | OUTPATIENT
Start: 2021-04-16 | End: 2021-06-07

## 2021-04-19 ENCOUNTER — TELEPHONE (OUTPATIENT)
Dept: PHARMACY | Facility: CLINIC | Age: 86
End: 2021-04-19

## 2021-04-19 NOTE — TELEPHONE ENCOUNTER
Oral Chemotherapy Monitoring Program   Medication: Revlimid  Rx: 10mg PO daily on days 1 through 14 of 21 day cycle   Auth #: :  [id number]  Obtained on: 4/19/2021   Risk Category: adult female of non child bearing potential  Routine survey questions reviewed.   Rx to be Escribed to [facility]    Terrie Bowen [facility] Oncology Pharmacy Liaison  [phone]

## 2021-04-22 ENCOUNTER — OFFICE VISIT (OUTPATIENT)
Dept: FAMILY MEDICINE | Facility: CLINIC | Age: 86
End: 2021-04-22
Payer: MEDICARE

## 2021-04-22 VITALS
HEIGHT: 62 IN | OXYGEN SATURATION: 96 % | DIASTOLIC BLOOD PRESSURE: 75 MMHG | HEART RATE: 83 BPM | TEMPERATURE: 99.6 F | SYSTOLIC BLOOD PRESSURE: 119 MMHG | BODY MASS INDEX: 26.31 KG/M2 | WEIGHT: 143 LBS

## 2021-04-22 DIAGNOSIS — J06.9 UPPER RESPIRATORY TRACT INFECTION, UNSPECIFIED TYPE: ICD-10-CM

## 2021-04-22 DIAGNOSIS — K14.8 TONGUE LESION: Primary | ICD-10-CM

## 2021-04-22 PROCEDURE — 99213 OFFICE O/P EST LOW 20 MIN: CPT | Performed by: INTERNAL MEDICINE

## 2021-04-22 RX ORDER — TRIAMCINOLONE ACETONIDE 0.1 %
PASTE (GRAM) DENTAL
Qty: 5 G | Refills: 0 | Status: SHIPPED | OUTPATIENT
Start: 2021-04-22 | End: 2021-10-12

## 2021-04-22 ASSESSMENT — MIFFLIN-ST. JEOR: SCORE: 1051.89

## 2021-04-22 NOTE — PROGRESS NOTES
Assessment & Plan     Tongue lesion  I suspect that there is some inflammatory process on the tip of the tongue related to pressure on her tongue from her dentures or dentition.  I think that a trial of Kenalog for a week would be warranted.  If the symptoms persist after that trial or if they recur after that trial, I recommended that she consult with an ENT specialist for consideration of a biopsy or further evaluation.  - triamcinolone (KENALOG) 0.1 % paste; Apply to tongue lesion twice daily for one week.    Upper respiratory tract infection, unspecified type  This seems to have largely resolved.  Her dyspnea is back to baseline.  If there is a change in dyspnea levels, I would agree with the oncology provider that recommended the CT scan to exclude pulmonary embolus given her current use of Revlimid.  This was explained to the patient and her daughter in detail.  For now, the patient does not wish to have that test.        24 minutes spent on the date of the encounter doing chart review, history and exam, documentation and further activities per the note         Return in about 2 months (around 6/22/2021) for recheck.  Patient instructed to return to clinic or contact us sooner if symptoms worsen or new symptoms develop.  See ENT if tongue lesion fails to resolve with Kenalog in [place]    César Chung MD  [facility] MAGALY Araya is a 85 year old who presents for the following health issues  accompanied by her Daughter-in-law, Regi:    HPI     Follow up chronic health cares      Her cough has resolved.  She describes a several year history of baseline shortness of breath.  Her current shortness of breath is back to that baseline.  She does not see any good reason to go for a CT scan to exclude a pulmonary embolus.  She denies chest pain.  She denies fevers.  Again, her chest x-ray was clear.  She complains of a new problem of a mildly painful lesion on the tip of her tongue  "that has been present for several weeks.  She does wear dentures.  She has never smoked cigarettes or used chewing tobacco.  She drinks rare alcohol.    Review of Systems         Objective    /75 (BP Location: Left arm, Cuff Size: Adult Regular)   Pulse 83   Temp 99.6  F (37.6  C) (Temporal)   Ht 1.575 m (5' 2\")   Wt 64.9 kg (143 lb)   SpO2 96%   Breastfeeding No   BMI 26.16 kg/m    Body mass index is 26.16 kg/m .  Physical Exam   General: This is a well-appearing female no acute distress.  HEENT: There is a bump on the tip of her tongue on the right side.  There is no ulceration.  There are no other oral lesions.  The mucous membranes are moist.  Cardiovascular: The heart has a regular rate and rhythm.  Pulmonary: The lungs are clear to auscultation bilaterally, no wheezing, breathing is not labored                "

## 2021-05-05 ENCOUNTER — HOSPITAL ENCOUNTER (OUTPATIENT)
Facility: CLINIC | Age: 86
Setting detail: SPECIMEN
Discharge: HOME | End: 2021-05-05
Attending: INTERNAL MEDICINE | Admitting: INTERNAL MEDICINE
Payer: MEDICARE

## 2021-05-05 ENCOUNTER — INFUSION THERAPY VISIT (OUTPATIENT)
Dept: INFUSION THERAPY | Facility: CLINIC | Age: 86
End: 2021-05-05
Attending: INTERNAL MEDICINE
Payer: MEDICARE

## 2021-05-05 ENCOUNTER — DOCUMENTATION ONLY (OUTPATIENT)
Dept: PHARMACY | Facility: CLINIC | Age: 86
End: 2021-05-05

## 2021-05-05 DIAGNOSIS — C90.00 MULTIPLE MYELOMA NOT HAVING ACHIEVED REMISSION (H): Primary | ICD-10-CM

## 2021-05-05 LAB
ALBUMIN SERPL-MCNC: 3.5 G/DL (ref 3.4–5)
ALP SERPL-CCNC: 71 U/L (ref 40–150)
ALT SERPL W P-5'-P-CCNC: 25 U/L (ref 0–50)
ANION GAP SERPL CALCULATED.3IONS-SCNC: 2 MMOL/L (ref 3–14)
AST SERPL W P-5'-P-CCNC: 14 U/L (ref 0–45)
BASOPHILS # BLD AUTO: 0 10E9/L (ref 0–0.2)
BASOPHILS NFR BLD AUTO: 0.7 %
BILIRUB SERPL-MCNC: 1 MG/DL (ref 0.2–1.3)
BUN SERPL-MCNC: 20 MG/DL (ref 7–30)
CALCIUM SERPL-MCNC: 8.6 MG/DL (ref 8.5–10.1)
CHLORIDE SERPL-SCNC: 108 MMOL/L (ref 94–109)
CO2 SERPL-SCNC: 31 MMOL/L (ref 20–32)
CREAT SERPL-MCNC: 0.63 MG/DL (ref 0.52–1.04)
DIFFERENTIAL METHOD BLD: ABNORMAL
EOSINOPHIL # BLD AUTO: 0.2 10E9/L (ref 0–0.7)
EOSINOPHIL NFR BLD AUTO: 4.3 %
ERYTHROCYTE [DISTWIDTH] IN BLOOD BY AUTOMATED COUNT: 13.9 % (ref 10–15)
GFR SERPL CREATININE-BSD FRML MDRD: 82 ML/MIN/{1.73_M2}
GLUCOSE SERPL-MCNC: 79 MG/DL (ref 70–99)
HCT VFR BLD AUTO: 35.8 % (ref 35–47)
HGB BLD-MCNC: 11.7 G/DL (ref 11.7–15.7)
IMM GRANULOCYTES # BLD: 0 10E9/L (ref 0–0.4)
IMM GRANULOCYTES NFR BLD: 0.5 %
LYMPHOCYTES # BLD AUTO: 1.4 10E9/L (ref 0.8–5.3)
LYMPHOCYTES NFR BLD AUTO: 32.7 %
MCH RBC QN AUTO: 31.1 PG (ref 26.5–33)
MCHC RBC AUTO-ENTMCNC: 32.7 G/DL (ref 31.5–36.5)
MCV RBC AUTO: 95 FL (ref 78–100)
MONOCYTES # BLD AUTO: 0.8 10E9/L (ref 0–1.3)
MONOCYTES NFR BLD AUTO: 18.2 %
NEUTROPHILS # BLD AUTO: 1.9 10E9/L (ref 1.6–8.3)
NEUTROPHILS NFR BLD AUTO: 43.6 %
NRBC # BLD AUTO: 0 10*3/UL
NRBC BLD AUTO-RTO: 0 /100
PLATELET # BLD AUTO: 187 10E9/L (ref 150–450)
POTASSIUM SERPL-SCNC: 3.7 MMOL/L (ref 3.4–5.3)
PROT SERPL-MCNC: 6.7 G/DL (ref 6.8–8.8)
RBC # BLD AUTO: 3.76 10E12/L (ref 3.8–5.2)
SODIUM SERPL-SCNC: 141 MMOL/L (ref 133–144)
WBC # BLD AUTO: 4.4 10E9/L (ref 4–11)

## 2021-05-05 PROCEDURE — 36415 COLL VENOUS BLD VENIPUNCTURE: CPT

## 2021-05-05 PROCEDURE — 80053 COMPREHEN METABOLIC PANEL: CPT | Performed by: INTERNAL MEDICINE

## 2021-05-05 PROCEDURE — 82784 ASSAY IGA/IGD/IGG/IGM EACH: CPT | Performed by: INTERNAL MEDICINE

## 2021-05-05 PROCEDURE — 84165 PROTEIN E-PHORESIS SERUM: CPT | Mod: 26 | Performed by: PATHOLOGY

## 2021-05-05 PROCEDURE — 83883 ASSAY NEPHELOMETRY NOT SPEC: CPT | Performed by: INTERNAL MEDICINE

## 2021-05-05 PROCEDURE — 85025 COMPLETE CBC W/AUTO DIFF WBC: CPT | Performed by: INTERNAL MEDICINE

## 2021-05-05 PROCEDURE — 999N001036 HC STATISTIC TOTAL PROTEIN: Performed by: INTERNAL MEDICINE

## 2021-05-05 PROCEDURE — 84165 PROTEIN E-PHORESIS SERUM: CPT | Mod: TC | Performed by: INTERNAL MEDICINE

## 2021-05-05 NOTE — PROGRESS NOTES
Medical Assistant Note:  Quiana Dunaway presents today for lab draw.    Patient seen by provider today: No.   present during visit today: Not Applicable.    Concerns: No Concerns.    Procedure:  Lab draw site: LAC, Needle type: Butterfly, Gauge: 23.    Post Assessment:  Labs drawn without difficulty: Yes.    Discharge Plan:  Departure Mode: Ambulatory.    Face to Face Time: 4 min.    Shari Schoenberger, CMA

## 2021-05-05 NOTE — PROGRESS NOTES
Oral Chemotherapy Monitoring Program  Lab Follow Up    Reviewed lab results from 5/5/21.    ORAL CHEMOTHERAPY 1/6/2021 1/22/2021 2/1/2021 3/9/2021 4/12/2021 4/16/2021 5/5/2021   Assessment Type Lab Monitoring - Lab Monitoring Lab Monitoring Lab Monitoring Refill Lab Monitoring   Diagnosis Code Multiple Myeloma Multiple Myeloma Multiple Myeloma Multiple Myeloma Multiple Myeloma Multiple Myeloma Multiple Myeloma   Providers Sohan Parry   Clinic Name/Location [facility] [place]   Drug Name Revlimid (Lenalidomide) Revlimid (Lenalidomide) Revlimid (Lenalidomide) Revlimid (Lenalidomide) Revlimid (lenalidomide) Revlimid (lenalidomide) Revlimid (lenalidomide)   Dose 10 mg 10 mg 10 mg 10 mg 10 mg 10 mg 10 mg   Current Schedule Daily Daily Daily Daily Daily Daily Daily   Cycle Details 2 weeks on, 1 week off 2 weeks on, 1 week off 2 weeks on, 1 week off 2 weeks on, 1 week off 2 weeks on, 1 week off 2 weeks on, 1 week off 2 weeks on, 1 week off   Start Date of Last Cycle 12/9/2020 12/30/2020 1/28/2021 2/18/2021 4/1/2021 4/1/2021 4/22/2021   Planned next cycle start date 1/7/2021 1/26/2021 2/18/2021 3/11/2021 4/22/2021 4/22/2021 5/13/2021   Doses missed in last 2 weeks - - - - - - -   Adherence Assessment - - - - - - -   Adverse Effects - No AE identified during assessment - - No AE identified during assessment - No AE identified during assessment   Fatigue - - - - - - -   Pharmacist Intervention(fatigue) - - - - - - -   Home BPs - - - - - - -   Any new drug interactions? - No - - - - -   Is the dose as ordered appropriate for the patient? - Yes - - - - -   Is the patient currently in pain? - - - - - - -   Has the patient been assessed within the past 6 months for depression? - - - - - - -   Has the patient missed any days of school, work, or other routine activity? - - - - - - -   Since the last time we talked, have you been hospitalized or used  the emergency room? - - - - - - -       Labs:  _  Result Component Current Result Ref Range   Sodium 141 (5/5/2021) 133 - 144 mmol/L     _  Result Component Current Result Ref Range   Potassium 3.7 (5/5/2021) 3.4 - 5.3 mmol/L     _  Result Component Current Result Ref Range   Calcium 8.6 (5/5/2021) 8.5 - 10.1 mg/dL     No results found for Mag within last 30 days.     No results found for Phos within last 30 days.     _  Result Component Current Result Ref Range   Albumin 3.5 (5/5/2021) 3.4 - 5.0 g/dL     _  Result Component Current Result Ref Range   Urea Nitrogen 20 (5/5/2021) 7 - 30 mg/dL     _  Result Component Current Result Ref Range   Creatinine 0.63 (5/5/2021) 0.52 - 1.04 mg/dL     _  Result Component Current Result Ref Range   AST 14 (5/5/2021) 0 - 45 U/L     _  Result Component Current Result Ref Range   ALT 25 (5/5/2021) 0 - 50 U/L     _  Result Component Current Result Ref Range   Bilirubin Total 1.0 (5/5/2021) 0.2 - 1.3 mg/dL     _  Result Component Current Result Ref Range   WBC 4.4 (5/5/2021) 4.0 - 11.0 10e9/L     _  Result Component Current Result Ref Range   Hemoglobin 11.7 (5/5/2021) 11.7 - 15.7 g/dL     _  Result Component Current Result Ref Range   Platelet Count 187 (5/5/2021) 150 - 450 10e9/L     _  Result Component Current Result Ref Range   Absolute Neutrophil 1.9 (5/5/2021) 1.6 - 8.3 10e9/L       Assessment & Plan:  No concerning abnormalities.    Follow-Up:  6/2: q4wk labs    Alfonso Deng, PharmD, MS  Hematology/Oncology Clinical Pharmacist  [facility]  [phone]

## 2021-05-06 ENCOUNTER — TELEPHONE (OUTPATIENT)
Dept: PHARMACY | Facility: CLINIC | Age: 86
End: 2021-05-06

## 2021-05-06 DIAGNOSIS — C90.00 MULTIPLE MYELOMA NOT HAVING ACHIEVED REMISSION (H): Primary | ICD-10-CM

## 2021-05-06 LAB
ALBUMIN SERPL ELPH-MCNC: 3.9 G/DL (ref 3.7–5.1)
ALPHA1 GLOB SERPL ELPH-MCNC: 0.3 G/DL (ref 0.2–0.4)
ALPHA2 GLOB SERPL ELPH-MCNC: 0.7 G/DL (ref 0.5–0.9)
B-GLOBULIN SERPL ELPH-MCNC: 0.6 G/DL (ref 0.6–1)
GAMMA GLOB SERPL ELPH-MCNC: 0.9 G/DL (ref 0.7–1.6)
IGG SERPL-MCNC: 936 MG/DL (ref 610–1616)
KAPPA LC UR-MCNC: 20.27 MG/DL (ref 0.33–1.94)
KAPPA LC/LAMBDA SER: 34.36 {RATIO} (ref 0.26–1.65)
LAMBDA LC SERPL-MCNC: 0.59 MG/DL (ref 0.57–2.63)
M PROTEIN SERPL ELPH-MCNC: 0.7 G/DL
PROT PATTERN SERPL ELPH-IMP: ABNORMAL

## 2021-05-06 RX ORDER — LENALIDOMIDE 10 MG/1
10 CAPSULE ORAL DAILY
Qty: 14 CAPSULE | Refills: 0 | Status: SHIPPED | OUTPATIENT
Start: 2021-05-06 | End: 2021-08-25

## 2021-05-06 RX ORDER — DEXAMETHASONE 4 MG/1
4 TABLET ORAL WEEKLY
Qty: 15 TABLET | Refills: 0 | Status: SHIPPED | OUTPATIENT
Start: 2021-05-06 | End: 2021-06-07

## 2021-05-06 NOTE — TELEPHONE ENCOUNTER
Oral Chemotherapy Monitoring Program   Medication: Revlimid  Rx: 10mg PO daily on days 1 through 14 of 21 day cycle   Auth #: [id number] obtained on 5/6/2021   Risk Category: adult female of non child bearing potential  Routine survey questions reviewed.   Rx to be Escribed to [facility]    Terrie Bowen [facility] Oncology Pharmacy Liaison  [phone]

## 2021-05-18 DIAGNOSIS — C90.00 MULTIPLE MYELOMA NOT HAVING ACHIEVED REMISSION (H): Primary | ICD-10-CM

## 2021-05-18 RX ORDER — LENALIDOMIDE 10 MG/1
10 CAPSULE ORAL DAILY
Qty: 14 CAPSULE | Refills: 0 | Status: SHIPPED | OUTPATIENT
Start: 2021-05-18 | End: 2021-08-25

## 2021-05-18 RX ORDER — DEXAMETHASONE 4 MG/1
4 TABLET ORAL WEEKLY
Qty: 15 TABLET | Refills: 0 | Status: SHIPPED | OUTPATIENT
Start: 2021-05-18 | End: 2021-08-25

## 2021-05-21 ENCOUNTER — NURSE TRIAGE (OUTPATIENT)
Dept: NURSING | Facility: CLINIC | Age: 86
End: 2021-05-21

## 2021-05-21 DIAGNOSIS — C90.00 MULTIPLE MYELOMA NOT HAVING ACHIEVED REMISSION (H): ICD-10-CM

## 2021-05-22 NOTE — TELEPHONE ENCOUNTER
BP is 184/101 and is on Lisinopril BID.  Took second dose at 5:30p, and BP taken about 90 minutes later.    Patient denies acute cardiac/neuro symptoms.  Noted in chart patient is supposed to be on Furosemide daily, but is not taking it.  They will follow up with PCP and call back for new red-flag symptoms.  Patient should see provider within 24 hours per guidelines.      Hillary Anderson RN  [facility]        Reason for Disposition    Systolic BP  >= 180 OR Diastolic >= 110    Additional Information    Negative: Difficult to awaken or acting confused (e.g., disoriented, slurred speech)    Negative: Severe difficulty breathing (e.g., struggling for each breath, speaks in single words)    Negative: [1] Weakness of the face, arm or leg on one side of the body AND [2] new onset    Negative: [1] Numbness (i.e., loss of sensation) of the face, arm or leg on one side of the body AND [2] new onset    Negative: [1] Chest pain lasts > 5 minutes AND [2] history of heart disease  (i.e., heart attack, bypass surgery, angina, angioplasty, CHF)    Negative: [1] Chest pain AND [2] took nitrogylcerin AND [3] pain was not relieved    Negative: Sounds like a life-threatening emergency to the triager    Negative: [1] Systolic BP  >= 160 OR Diastolic >= 100 AND [2] cardiac or neurologic symptoms (e.g., chest pain, difficulty breathing, unsteady gait, blurred vision)    Negative: [1] Pregnant > 20 weeks (or postpartum < 6 weeks) AND [2] new hand or face swelling    Negative: [1] Pregnant > 20 weeks AND [2] BP Systolic BP  >= 140 OR Diastolic >= 90    Negative: [1] Systolic BP  >= 200 OR Diastolic >= 120  AND [2] having NO cardiac or neurologic symptoms    Negative: [1] Postpartum < 6 weeks AND [2] BP Systolic BP  >= 140 OR Diastolic >= 90    Negative: [1] Systolic BP  >= 180 OR Diastolic >= 110 AND [2] missed most recent dose of blood pressure medication    Protocols used: HIGH BLOOD PRESSURE-A-

## 2021-05-24 RX ORDER — PROCHLORPERAZINE MALEATE 10 MG
10 TABLET ORAL EVERY 6 HOURS PRN
Qty: 30 TABLET | Refills: 6 | Status: SHIPPED | OUTPATIENT
Start: 2021-05-24 | End: 2021-11-17

## 2021-05-24 NOTE — TELEPHONE ENCOUNTER
Routing refill request to provider for review/approval because:  Routing for dose review  Arleth Richards RN

## 2021-05-28 ENCOUNTER — NURSE TRIAGE (OUTPATIENT)
Dept: FAMILY MEDICINE | Facility: CLINIC | Age: 86
End: 2021-05-28

## 2021-05-28 DIAGNOSIS — R06.02 SHORTNESS OF BREATH: ICD-10-CM

## 2021-05-28 NOTE — TELEPHONE ENCOUNTER
Family called c/o high BP readings     /99 - 5 mins ago     BP 2 hours 197/90     Has been higher than normal recently    Right now took extra BP med - full tab - Lisinopril     Generalized weakness     No recent missed BP meds     No recent med change     HR - 90 bpm     Med list has   Lisinopril 2.5mg   Furosemide 20mg -Rx'd by oncology. Pt has not been taking this. Unsure why it was prescribed. 1 year was sent in April to [facility]     Discussed UC would be best as it is after 5pm on a Friday with high BP readings and PCP is not in office currently     Call back Regi - [phone]    Additional Information    Negative: Difficult to awaken or acting confused (e.g., disoriented, slurred speech)    Negative: Severe difficulty breathing (e.g., struggling for each breath, speaks in single words)    Negative: [1] Weakness of the face, arm or leg on one side of the body AND [2] new onset    Negative: [1] Numbness (i.e., loss of sensation) of the face, arm or leg on one side of the body AND [2] new onset    Negative: [1] Chest pain lasts > 5 minutes AND [2] history of heart disease  (i.e., heart attack, bypass surgery, angina, angioplasty, CHF)    Negative: [1] Chest pain AND [2] took nitrogylcerin AND [3] pain was not relieved    Negative: Sounds like a life-threatening emergency to the triager    Negative: Symptom is main concern  (e.g., headache, chest pain)    Negative: Low blood pressure is main concern    Negative: [1] Systolic BP  >= 160 OR Diastolic >= 100 AND [2] cardiac or neurologic symptoms (e.g., chest pain, difficulty breathing, unsteady gait, blurred vision)    Negative: [1] Pregnant > 20 weeks (or postpartum < 6 weeks) AND [2] new hand or face swelling    Negative: [1] Pregnant > 20 weeks AND [2] BP Systolic BP  >= 140 OR Diastolic >= 90    Negative: [1] Systolic BP  >= 200 OR Diastolic >= 120  AND [2] having NO cardiac or neurologic symptoms    Negative: [1] Postpartum < 6 weeks AND [2] BP  Systolic BP  >= 140 OR Diastolic >= 90    Protocols used: HIGH BLOOD PRESSURE-A-AH

## 2021-05-30 DIAGNOSIS — C90.00 MULTIPLE MYELOMA NOT HAVING ACHIEVED REMISSION (H): Primary | ICD-10-CM

## 2021-05-31 ENCOUNTER — NURSE TRIAGE (OUTPATIENT)
Dept: NURSING | Facility: CLINIC | Age: 86
End: 2021-05-31

## 2021-06-01 ENCOUNTER — OFFICE VISIT (OUTPATIENT)
Dept: FAMILY MEDICINE | Facility: CLINIC | Age: 86
End: 2021-06-01
Payer: MEDICARE

## 2021-06-01 VITALS
HEART RATE: 86 BPM | BODY MASS INDEX: 25.97 KG/M2 | SYSTOLIC BLOOD PRESSURE: 132 MMHG | WEIGHT: 142 LBS | TEMPERATURE: 97.2 F | DIASTOLIC BLOOD PRESSURE: 84 MMHG | OXYGEN SATURATION: 99 %

## 2021-06-01 DIAGNOSIS — R79.89 LOW TSH LEVEL: ICD-10-CM

## 2021-06-01 DIAGNOSIS — Z95.0 PACEMAKER: ICD-10-CM

## 2021-06-01 DIAGNOSIS — I50.32 CHRONIC DIASTOLIC CONGESTIVE HEART FAILURE (H): ICD-10-CM

## 2021-06-01 DIAGNOSIS — Z23 NEED FOR VACCINATION: ICD-10-CM

## 2021-06-01 DIAGNOSIS — D70.9 NEUTROPENIA, UNSPECIFIED TYPE (H): ICD-10-CM

## 2021-06-01 DIAGNOSIS — I10 BENIGN ESSENTIAL HYPERTENSION: Primary | ICD-10-CM

## 2021-06-01 DIAGNOSIS — Z13.220 SCREENING FOR HYPERLIPIDEMIA: ICD-10-CM

## 2021-06-01 DIAGNOSIS — C90.00 MULTIPLE MYELOMA NOT HAVING ACHIEVED REMISSION (H): ICD-10-CM

## 2021-06-01 DIAGNOSIS — Z78.0 ASYMPTOMATIC MENOPAUSE: ICD-10-CM

## 2021-06-01 DIAGNOSIS — I49.5 SICK SINUS SYNDROME (H): ICD-10-CM

## 2021-06-01 DIAGNOSIS — R06.02 SHORTNESS OF BREATH: ICD-10-CM

## 2021-06-01 LAB
CHOLEST SERPL-MCNC: 152 MG/DL
HDLC SERPL-MCNC: 77 MG/DL
LDLC SERPL CALC-MCNC: 58 MG/DL
NONHDLC SERPL-MCNC: 75 MG/DL
T4 FREE SERPL-MCNC: 1.14 NG/DL (ref 0.76–1.46)
TRIGL SERPL-MCNC: 83 MG/DL
TSH SERPL DL<=0.005 MIU/L-ACNC: 0.04 MU/L (ref 0.4–4)

## 2021-06-01 PROCEDURE — 84439 ASSAY OF FREE THYROXINE: CPT | Performed by: INTERNAL MEDICINE

## 2021-06-01 PROCEDURE — 90471 IMMUNIZATION ADMIN: CPT | Performed by: INTERNAL MEDICINE

## 2021-06-01 PROCEDURE — 90714 TD VACC NO PRESV 7 YRS+ IM: CPT | Performed by: INTERNAL MEDICINE

## 2021-06-01 PROCEDURE — 99214 OFFICE O/P EST MOD 30 MIN: CPT | Mod: 25 | Performed by: INTERNAL MEDICINE

## 2021-06-01 PROCEDURE — 36415 COLL VENOUS BLD VENIPUNCTURE: CPT | Performed by: INTERNAL MEDICINE

## 2021-06-01 PROCEDURE — 80061 LIPID PANEL: CPT | Performed by: INTERNAL MEDICINE

## 2021-06-01 PROCEDURE — 84443 ASSAY THYROID STIM HORMONE: CPT | Performed by: INTERNAL MEDICINE

## 2021-06-01 RX ORDER — LISINOPRIL 5 MG/1
5 TABLET ORAL DAILY
Qty: 90 TABLET | Refills: 1 | Status: SHIPPED | OUTPATIENT
Start: 2021-06-01 | End: 2021-08-20

## 2021-06-01 RX ORDER — FUROSEMIDE 20 MG
10 TABLET ORAL DAILY PRN
Qty: 90 TABLET | Refills: 3 | Status: SHIPPED | OUTPATIENT
Start: 2021-06-01 | End: 2022-07-19

## 2021-06-01 NOTE — PATIENT INSTRUCTIONS
"Given your high BP , have increased Lisinopril 5 mg daily and also a rescue pill Lasix 10 mg as needed for BP greater than 160/90.     Please take the BP before you take the pill and just in case if its too low to less than 90/60 then do not take the Lisinopril also for next 2 hours - Recheck and take the Lisinopril..     =========================    Dietary Approaches to Stop Hypertension trial -- A different approach was evaluated in the Dietary Approaches to Stop Hypertension (DASH) trial [6]. Rather than evaluating sodium intake or weight loss, DASH randomly assigned 459 patients with BPs of less than 160/80 to 95 mmHg to one of three diets:  ?A control diet low in fruits, vegetables, and legumes and high in snacks, sweets, meats, and saturated fat.  ?A diet rich in fruits, vegetables, legumes and low in snacks and sweets.  ?A combination diet rich in fruits, vegetables, legumes, and low-fat dairy products and low in snacks, sweets, meats, and saturated and total fat (this combination diet is called the \"DASH diet\"). The DASH diet is comprised of four to five servings of fruit, four to five servings of vegetables, two to three servings of low-fat dairy per day, and <25 percent fat.  The following observations were noted in which the BP reductions were expressed in relation to the fall in BP seen with the control diet:  ?The fruits and vegetables diet reduced the BP by 2.8/1.1 mmHg, and the combination diet reduced the BP by 5.5/3.0.  ?These effects were more pronounced in patients with hypertension. With the combination diet, for example, the BP fell 11.4/5.5 mmHg in hypertensives versus 3.5/2.1 mmHg in the normotensives.  ?The antihypertensive effects were maximal by the end of week 2 with any of the diets and were then maintained for eight weeks.     "

## 2021-06-01 NOTE — TELEPHONE ENCOUNTER
Daughter-in-law calling - is not with patient. Conferenced patient into phone call. Is on Lisinopril for HTN - has been on for several years. 1 week ago Friday her BP spiked into 190's. Has been up and down. On Friday BP was 214/99 - called clinic - was advised UC evaluation but patient declined.     Currently 185/85  HR 93 - says she is feeling a little weak. No chest pain, no vision changes. Usually takes Lisinopril 2.5 mg daily in the morning - since she wasn't feeling well she took a 2nd one today (around 1400).     On Friday she took Hydralazine 10 mg (family friend who is MD recommended that - which did help on Friday).     On oral chemo (is off this week). Uses a cane all the time - has diarrhea now (which is normal). States she is feeling better than she did on Friday.     Has appointment with Dr. Chung on Wednesday - really wants to be seen in person. Warm transferred to schedulers - no openings at [facility] tomorrow. Appointment scheduled for [facility] tomorrow morning. Advised patient call us back tonight with any changes.    Haritha Conner RN on 5/31/2021 at 7:44 PM    Reason for Disposition    Systolic BP  >= 180 OR Diastolic >= 110    Additional Information    Negative: Difficult to awaken or acting confused (e.g., disoriented, slurred speech)    Negative: Severe difficulty breathing (e.g., struggling for each breath, speaks in single words)    Negative: [1] Weakness of the face, arm or leg on one side of the body AND [2] new onset    Negative: [1] Numbness (i.e., loss of sensation) of the face, arm or leg on one side of the body AND [2] new onset    Negative: [1] Chest pain lasts > 5 minutes AND [2] history of heart disease  (i.e., heart attack, bypass surgery, angina, angioplasty, CHF)    Negative: [1] Chest pain AND [2] took nitrogylcerin AND [3] pain was not relieved    Negative: Sounds like a life-threatening emergency to the triager    Negative: Symptom is main concern  (e.g., headache,  chest pain)    Negative: Low blood pressure is main concern    Negative: [1] Systolic BP  >= 160 OR Diastolic >= 100 AND [2] cardiac or neurologic symptoms (e.g., chest pain, difficulty breathing, unsteady gait, blurred vision)    Negative: [1] Pregnant > 20 weeks (or postpartum < 6 weeks) AND [2] new hand or face swelling    Negative: [1] Pregnant > 20 weeks AND [2] BP Systolic BP  >= 140 OR Diastolic >= 90    Negative: [1] Systolic BP  >= 200 OR Diastolic >= 120  AND [2] having NO cardiac or neurologic symptoms    Negative: [1] Postpartum < 6 weeks AND [2] BP Systolic BP  >= 140 OR Diastolic >= 90    Negative: [1] Systolic BP  >= 180 OR Diastolic >= 110 AND [2] missed most recent dose of blood pressure medication    Protocols used: HIGH BLOOD PRESSURE-A-AH

## 2021-06-01 NOTE — LETTER
My Heart Failure Action Plan   Name: Quiana Dunaway    YOB: 1935   Date: 6/1/2021    My doctor: César Chung [address] [id number] [phone]  My Diagnosis: Reduced (HFr)- EF:Over 50%   My Exercise Goal: 30 minutes daily  .     My Weight Plan:   Wt Readings from Last 2 Encounters:   06/01/21 64.4 kg (142 lb)   04/22/21 64.9 kg (143 lb)     Weigh yourself daily using the same scale. If you gain more than 2 pounds in 24 hours or 5 pounds in a week call the clinic    My Diet Goal: No added salt    Emergency Room Visits:    Our goal is to improve your quality of life and help you avoid a visit to the emergency room or hospital.  If we work together, we can achieve this goal. But, if you feel you need to call 911 or go to the emergency room, please do so.  If you go to the emergency room, please bring your list of medicines and your daily weight chart with you.       GREEN ZONE     Doing well today    Weight gained is no more than 2 pounds a day or 5 pounds a week.    No swelling in feet, ankles, legs or stomach.    No more swelling than usual.    No more trouble breathing than usual.    No change in my sleep.    No other problems. Actions:    I am doing fine.  I will take my medicine, follow my diet, see my doctor, exercise, and watch for symptoms.           YELLOW ZONE         Having a bad day or flare up    Weight gain of more than 2 pounds in one day or 5 pounds in one week.    New swelling in ankle, leg, knee or thigh.    Bloating in belly, pants feel tighter.    Swelling in hands or face.    Coughing or trouble breathing while walking or talking.    Harder to breathe last night.    Have trouble sleeping, wake up short of breath.    Much more tired than usual.    Not eating.    Pain in my chest or bad leg cramps.    Feel weak or dizzy. Actions:    I need to take action and call my doctor or nurse today.                  RED ZONE         Need medical care now    Weight gain of 5 pounds overnight.    Chest pain or pressure that does not go away.    Feel less alert.    Wheezing or have trouble breathing when at rest.    Cannot sleep lying down.    Cannot take my water pill.    Pass out or faint. Actions:    I need to call my doctor or nurse now!    Call 911 if I have chest pain or cannot breathe.

## 2021-06-01 NOTE — PROGRESS NOTES
Assessment and Plan  1. Benign essential hypertension  Uncontrolled, Currently on HTN meds Lisnopril 2.5 mg daily and Lasix 20 mg daily stopped in 4/2021 after taking for one month . As per RN triage note yesterday BP spiking high to 214/99 - 185/85 Has taken Extra Lisinopril and freinds Hydralazine 10 mg.  - Given risk factors of Chronic CHF , CKD 3 will increase Lisinopril to 5 mg daily and as needed Lasix for high BP. Parameters given in AVS which pt and caregiver understood and agreed. Last labs in 5/5/21 showing normal renal function .   - lisinopril (ZESTRIL) 5 MG tablet; Take 1 tablet (5 mg) by mouth daily  Dispense: 90 tablet; Refill: 1  - furosemide (LASIX) 20 MG tablet; Take 0.5 tablets (10 mg) by mouth daily as needed (For BP greater than 160/90)  Dispense: 90 tablet; Refill: 3    2. Chronic diastolic congestive heart failure (H)  Last Cardiology note with  on 7/2020 reviewed . Last Echo in 5/2020 showing normal LVEF ( HFpEF ) . CT PE in 5/2020 does show interstitial edema of lungs ,stabe left thyroid nodule. Due for HM including HF plan and Lipid panel.     3. Multiple myeloma not having achieved remission (H)  4. Neutropenia, unspecified type (H)  5. Shortness of breath  Stable, Currently on on Revlimid and Zometa as managed by Oncology and given the dyspnea , possible need for CT PE to be checked but pt yet to schedule, emphasized to get it done.      6. Sick sinus syndrome (H)  7. Pacemaker  Does have Cardiac risk factors of SSS with symptomatic 2nd degree AV block S/P PM in 2017.  - BETA BLOCKER NOT PRESCRIBED (INTENTIONAL); Please choose reason not prescribed from choices below.  Dispense:      8. Screening for hyperlipidemia  - Lipid panel reflex to direct LDL Fasting    9. Need for vaccination  - TD PRESERV FREE, IM (7+ YRS)    10. Asymptomatic menopause  - DEXA HIP/PELVIS/SPINE - Future; Future    11. Low TSH level  Does have low TSH but normal T4 in 2018 , will make sure its normalized  "given intermittent Uncontrolled HTN.   - TSH with free T4 reflex    Discussed with Daughter In law # Johnathan.      Patient Instructions   Given your high BP , have increased Lisinopril 5 mg daily and also a rescue pill Lasix 10 mg as needed for BP greater than 160/90.     Please take the BP before you take the pill and just in case if its too low to less than 90/60 then do not take the Lisinopril also for next 2 hours - Recheck and take the Lisinopril..     =========================    Dietary Approaches to Stop Hypertension trial -- A different approach was evaluated in the Dietary Approaches to Stop Hypertension (DASH) trial [6]. Rather than evaluating sodium intake or weight loss, DASH randomly assigned 459 patients with BPs of less than 160/80 to 95 mmHg to one of three diets:  ?A control diet low in fruits, vegetables, and legumes and high in snacks, sweets, meats, and saturated fat.  ?A diet rich in fruits, vegetables, legumes and low in snacks and sweets.  ?A combination diet rich in fruits, vegetables, legumes, and low-fat dairy products and low in snacks, sweets, meats, and saturated and total fat (this combination diet is called the \"DASH diet\"). The DASH diet is comprised of four to five servings of fruit, four to five servings of vegetables, two to three servings of low-fat dairy per day, and <25 percent fat.  The following observations were noted in which the BP reductions were expressed in relation to the fall in BP seen with the control diet:  ?The fruits and vegetables diet reduced the BP by 2.8/1.1 mmHg, and the combination diet reduced the BP by 5.5/3.0.  ?These effects were more pronounced in patients with hypertension. With the combination diet, for example, the BP fell 11.4/5.5 mmHg in hypertensives versus 3.5/2.1 mmHg in the normotensives.  ?The antihypertensive effects were maximal by the end of week 2 with any of the diets and were then maintained for eight weeks.       Return in about 3 months " (around 9/1/2021), or if symptoms worsen or fail to improve, for Follow up of last visit, BP Recheck.    Rossi Gaxiola MD  [facility] MARIELLA Araya is a 85 year old who presents for the following health issues     HPI     Hypertension Follow-up      Do you check your blood pressure regularly outside of the clinic? Yes     Are you following a low salt diet? Yes    Are your blood pressures ever more than 140 on the top number (systolic) OR more   than 90 on the bottom number (diastolic), for example 140/90? Started going high a week ago Friday. Otherwise she was doing just fine      How many servings of fruits and vegetables do you eat daily?  2-3    On average, how many sweetened beverages do you drink each day (Examples: soda, juice, sweet tea, etc.  Do NOT count diet or artificially sweetened beverages)?   0    How many days per week do you exercise enough to make your heart beat faster?     How many minutes a day do you exercise enough to make your heart beat faster?     How many days per week do you miss taking your medication? 0    Pt is new to me, last seen PCP  at [facility] on 4/22/21 for tongue lesion for which she was prescribed Kenalog paste and possible need of Bx with ENT.       Allergies   Allergen Reactions     Levaquin [Levofloxacin] Other (See Comments)     Tingling in feet after 1 dose on 8/7/19          Past Medical History:   Diagnosis Date     Anxiety      HTN (hypertension)      Multiple myeloma not having achieved remission (H) 7/18/2017     Pacemaker     Placed due to symptomatic second degree AV Block     Pancytopenia (H) 6/28/2017     Paroxysmal atrial fibrillation (H)        Past Surgical History:   Procedure Laterality Date     ------------OTHER-------------      cataract eye surgery     BONE MARROW BIOPSY, BONE SPECIMEN, NEEDLE/TROCAR N/A 7/12/2017    Procedure: BIOPSY BONE MARROW;  BONE MARROW BIOPSY ;  Surgeon: Grace Vela,  MD;  Location:  GI     IMPLANT PACEMAKER  03/2017    AV block       Family History   Problem Relation Age of Onset     Unknown/Adopted Mother      Unknown/Adopted Father        Social History     Tobacco Use     Smoking status: Never Smoker     Smokeless tobacco: Never Used   Substance Use Topics     Alcohol use: No     Alcohol/week: 0.0 standard drinks        Current Outpatient Medications   Medication     acyclovir (ZOVIRAX) 400 MG tablet     aspirin 81 MG chewable tablet     BETA BLOCKER NOT PRESCRIBED (INTENTIONAL)     dexamethasone (DECADRON) 4 MG tablet     dexamethasone (DECADRON) 4 MG tablet     dexamethasone (DECADRON) 4 MG tablet     dexamethasone (DECADRON) 4 MG tablet     dexamethasone (DECADRON) 4 MG tablet     estradiol (ESTRACE VAGINAL) 0.1 MG/GM vaginal cream     furosemide (LASIX) 20 MG tablet     LENalidomide (REVLIMID) 10 MG CAPS capsule     lisinopril (ZESTRIL) 5 MG tablet     Loperamide HCl (IMODIUM A-D PO)     LORazepam (ATIVAN) 0.5 MG tablet     mirabegron (MYRBETRIQ) 50 MG 24 hr tablet     nitroglycerin (NITROSTAT) 0.4 MG sublingual tablet     potassium chloride ER (KLOR-CON M) 10 MEQ CR tablet     prochlorperazine (COMPAZINE) 10 MG tablet     triamcinolone (KENALOG) 0.1 % paste     vitamin B complex with vitamin C (VITAMIN  B COMPLEX) tablet     vitamin D3 (CHOLECALCIFEROL) 50 mcg (2000 units) tablet     zinc gluconate 50 MG tablet     Zoledronic Acid (ZOMETA IV)     LENalidomide (REVLIMID) 10 MG CAPS capsule     LENalidomide (REVLIMID) 10 MG CAPS capsule     LENalidomide (REVLIMID) 10 MG CAPS capsule     LENalidomide (REVLIMID) 10 MG CAPS capsule     No current facility-administered medications for this visit.         Review of Systems   Constitutional, HEENT, cardiovascular, pulmonary, GI, , musculoskeletal, neuro, skin, endocrine and psych systems are negative, except as otherwise noted.      Objective    /84   Pulse 86   Temp 97.2  F (36.2  C) (Tympanic)   Wt 64.4 kg (142  lb)   SpO2 99%   BMI 25.97 kg/m    Body mass index is 25.97 kg/m .  Physical Exam   GENERAL: healthy, alert and no distress  FACE : POSITIVE for skin lesion on the left angle of the mouth and also on the Rt lateral border of the tongue which has improved as per the pat. Supposed to follow ENT for need of biopsy.  NECK: no adenopathy, no asymmetry, masses, or scars and thyroid normal to palpation  RESP: lungs clear to auscultation - no rales, rhonchi or wheezes  CV: regular rate and rhythm, normal S1 S2, no S3 or S4, no murmur, click or rub, no peripheral edema and peripheral pulses strong  ABDOMEN: soft, nontender, no hepatosplenomegaly, no masses and bowel sounds normal  MS: no gross musculoskeletal defects noted, no edema    Labs and imaging reviewed and discussed with the patient.

## 2021-06-02 ENCOUNTER — VIRTUAL VISIT (OUTPATIENT)
Dept: FAMILY MEDICINE | Facility: CLINIC | Age: 86
End: 2021-06-02
Payer: MEDICARE

## 2021-06-02 ENCOUNTER — HOSPITAL ENCOUNTER (OUTPATIENT)
Facility: CLINIC | Age: 86
Setting detail: SPECIMEN
Discharge: HOME | End: 2021-06-02
Attending: INTERNAL MEDICINE | Admitting: INTERNAL MEDICINE
Payer: MEDICARE

## 2021-06-02 ENCOUNTER — TELEPHONE (OUTPATIENT)
Dept: FAMILY MEDICINE | Facility: CLINIC | Age: 86
End: 2021-06-02

## 2021-06-02 ENCOUNTER — INFUSION THERAPY VISIT (OUTPATIENT)
Dept: INFUSION THERAPY | Facility: CLINIC | Age: 86
End: 2021-06-02
Attending: INTERNAL MEDICINE
Payer: MEDICARE

## 2021-06-02 DIAGNOSIS — C90.00 MULTIPLE MYELOMA NOT HAVING ACHIEVED REMISSION (H): ICD-10-CM

## 2021-06-02 DIAGNOSIS — I10 BENIGN ESSENTIAL HYPERTENSION: Primary | ICD-10-CM

## 2021-06-02 DIAGNOSIS — C90.00 MULTIPLE MYELOMA NOT HAVING ACHIEVED REMISSION (H): Primary | ICD-10-CM

## 2021-06-02 LAB
ALBUMIN SERPL-MCNC: 3.3 G/DL (ref 3.4–5)
ALP SERPL-CCNC: 65 U/L (ref 40–150)
ALT SERPL W P-5'-P-CCNC: 22 U/L (ref 0–50)
ANION GAP SERPL CALCULATED.3IONS-SCNC: 3 MMOL/L (ref 3–14)
AST SERPL W P-5'-P-CCNC: 10 U/L (ref 0–45)
BASOPHILS # BLD AUTO: 0 10E9/L (ref 0–0.2)
BASOPHILS NFR BLD AUTO: 1 %
BILIRUB SERPL-MCNC: 1.2 MG/DL (ref 0.2–1.3)
BUN SERPL-MCNC: 23 MG/DL (ref 7–30)
CALCIUM SERPL-MCNC: 8.8 MG/DL (ref 8.5–10.1)
CHLORIDE SERPL-SCNC: 108 MMOL/L (ref 94–109)
CO2 SERPL-SCNC: 30 MMOL/L (ref 20–32)
CREAT SERPL-MCNC: 0.67 MG/DL (ref 0.52–1.04)
DIFFERENTIAL METHOD BLD: ABNORMAL
EOSINOPHIL # BLD AUTO: 0 10E9/L (ref 0–0.7)
EOSINOPHIL NFR BLD AUTO: 1 %
ERYTHROCYTE [DISTWIDTH] IN BLOOD BY AUTOMATED COUNT: 13.2 % (ref 10–15)
GFR SERPL CREATININE-BSD FRML MDRD: 80 ML/MIN/{1.73_M2}
GLUCOSE SERPL-MCNC: 83 MG/DL (ref 70–99)
HCT VFR BLD AUTO: 35.4 % (ref 35–47)
HGB BLD-MCNC: 11.8 G/DL (ref 11.7–15.7)
IMM GRANULOCYTES # BLD: 0 10E9/L (ref 0–0.4)
IMM GRANULOCYTES NFR BLD: 0 %
LYMPHOCYTES # BLD AUTO: 1.6 10E9/L (ref 0.8–5.3)
LYMPHOCYTES NFR BLD AUTO: 39.8 %
MCH RBC QN AUTO: 31.6 PG (ref 26.5–33)
MCHC RBC AUTO-ENTMCNC: 33.3 G/DL (ref 31.5–36.5)
MCV RBC AUTO: 95 FL (ref 78–100)
MONOCYTES # BLD AUTO: 0.8 10E9/L (ref 0–1.3)
MONOCYTES NFR BLD AUTO: 19.5 %
NEUTROPHILS # BLD AUTO: 1.5 10E9/L (ref 1.6–8.3)
NEUTROPHILS NFR BLD AUTO: 38.7 %
NRBC # BLD AUTO: 0 10*3/UL
NRBC BLD AUTO-RTO: 0 /100
PLATELET # BLD AUTO: 113 10E9/L (ref 150–450)
POTASSIUM SERPL-SCNC: 3.7 MMOL/L (ref 3.4–5.3)
PROT SERPL-MCNC: 6.6 G/DL (ref 6.8–8.8)
RBC # BLD AUTO: 3.73 10E12/L (ref 3.8–5.2)
SODIUM SERPL-SCNC: 141 MMOL/L (ref 133–144)
WBC # BLD AUTO: 3.9 10E9/L (ref 4–11)

## 2021-06-02 PROCEDURE — 99213 OFFICE O/P EST LOW 20 MIN: CPT | Mod: 95 | Performed by: INTERNAL MEDICINE

## 2021-06-02 PROCEDURE — 80053 COMPREHEN METABOLIC PANEL: CPT | Performed by: INTERNAL MEDICINE

## 2021-06-02 PROCEDURE — 85025 COMPLETE CBC W/AUTO DIFF WBC: CPT

## 2021-06-02 PROCEDURE — 36415 COLL VENOUS BLD VENIPUNCTURE: CPT

## 2021-06-02 PROCEDURE — 85025 COMPLETE CBC W/AUTO DIFF WBC: CPT | Performed by: INTERNAL MEDICINE

## 2021-06-02 NOTE — TELEPHONE ENCOUNTER
----- Message from Rossi Gaxiola MD sent at 6/1/2021 11:27 PM CDT -----  Your TSH remaining low as seen in the past. Possible Multiple myeloma disease process sometimes affects thyroid gland. Recommend to follow up with your Oncologist if they have any recommendations for this.     Thank you,  Rossi Gaxiola MD on 6/1/2021 at 11:26 PM

## 2021-06-02 NOTE — PROGRESS NOTES
Quiana is a 85 year old who is being evaluated via a billable video visit.      How would you like to obtain your AVS? MyChart  If the video visit is dropped, the invitation should be resent by: Text to cell phone: [phone]  Will anyone else be joining your video visit? No    Video Start Time: 3:32 PM    Assessment & Plan     Benign essential hypertension  I agree with increasing lisinopril from 2.5 daily to 5 mg daily.  I also think that the notion of taking the furosemide on an as-needed basis for elevated blood pressure readings is an excellent idea.  The furosemide will really help with systolic blood pressure control when the blood pressure spikes.  This was explained to the patient and her daughter in detail.  We also discussed diet sodium considerations.  We discussed that the blood pressure should be checked once or twice a week at varying times during the day only after 30 minutes of rest and relaxation.  If there are further blood pressure spikes, consideration would need to be given to decreasing the dose or stopping Myrbetriq, but at this time, she does not want to stop that medication because it has been a great quality of life improver for her.        23 minutes spent on the date of the encounter doing chart review, history and exam, documentation and further activities per the note             Return in about 1 month (around 7/5/2021) for recheck.   Patient instructed to return to clinic or contact us sooner if symptoms worsen or new symptoms develop.     César Chung MD  [facility] MAGALY Araya is a 85 year old who presents for the following health issues  accompanied by her Daughter in law, Regi:    HPI     Follow up BP - was seen in [facility] [place] yesterday for her BP which has been fluctuating a lot in the past week. Would like to follow up on this visit and discuss treatment plan from other provider.           She feels much better   Home blood pressure readings  are much better  She had some questions about diet sodium intake  She questions about how often to check her blood pressure at home    Review of Systems         Objective           Vitals:  No vitals were obtained today due to virtual visit.    Physical Exam   GENERAL: Healthy, alert and no distress  EYES: Eyes grossly normal to inspection.  No discharge or erythema, or obvious scleral/conjunctival abnormalities.  RESP: No audible wheeze, cough, or visible cyanosis.  No visible retractions or increased work of breathing.    SKIN: Visible skin clear. No significant rash, abnormal pigmentation or lesions.  NEURO: Cranial nerves grossly intact.  Mentation and speech appropriate for age.  PSYCH: Mentation appears normal, affect normal/bright, judgement and insight intact, normal speech and appearance well-groomed.                Video-Visit Details    Type of service:  Video Visit    Video End Time:3:52 PM    Originating Location (pt. Location): Home    Distant Location (provider location):  [facility]     Platform used for Video Visit: Saundra

## 2021-06-02 NOTE — PROGRESS NOTES
Medical Assistant Note:  Quiana Dunaway presents today for blood draw.    Patient seen by provider today: No.   present during visit today: Not Applicable.    Concerns: No Concerns.    Procedure:  Lab draw site: LAC, Needle type: BF, Gauge: 23g.    Post Assessment:  Labs drawn without difficulty: Yes.    Discharge Plan:  Departure Mode: Ambulatory.    Face to Face Time: 5.    Farida Danielle MA

## 2021-06-04 ENCOUNTER — ANCILLARY PROCEDURE (OUTPATIENT)
Dept: CARDIOLOGY | Facility: CLINIC | Age: 86
End: 2021-06-04
Attending: INTERNAL MEDICINE
Payer: MEDICARE

## 2021-06-04 DIAGNOSIS — Z95.0 CARDIAC PACEMAKER IN SITU: ICD-10-CM

## 2021-06-04 PROCEDURE — 93294 REM INTERROG EVL PM/LDLS PM: CPT | Performed by: INTERNAL MEDICINE

## 2021-06-04 PROCEDURE — 93296 REM INTERROG EVL PM/IDS: CPT | Performed by: INTERNAL MEDICINE

## 2021-06-07 ENCOUNTER — ONCOLOGY VISIT (OUTPATIENT)
Dept: ONCOLOGY | Facility: CLINIC | Age: 86
End: 2021-06-07
Attending: PHYSICAL MEDICINE & REHABILITATION
Payer: MEDICARE

## 2021-06-07 VITALS
DIASTOLIC BLOOD PRESSURE: 65 MMHG | HEIGHT: 62 IN | BODY MASS INDEX: 25.14 KG/M2 | HEART RATE: 81 BPM | OXYGEN SATURATION: 95 % | TEMPERATURE: 98.5 F | WEIGHT: 136.6 LBS | SYSTOLIC BLOOD PRESSURE: 107 MMHG | RESPIRATION RATE: 16 BRPM

## 2021-06-07 DIAGNOSIS — C90.00 MULTIPLE MYELOMA NOT HAVING ACHIEVED REMISSION (H): Primary | ICD-10-CM

## 2021-06-07 DIAGNOSIS — R63.4 WEIGHT LOSS: ICD-10-CM

## 2021-06-07 PROCEDURE — 99214 OFFICE O/P EST MOD 30 MIN: CPT | Performed by: INTERNAL MEDICINE

## 2021-06-07 PROCEDURE — G0463 HOSPITAL OUTPT CLINIC VISIT: HCPCS

## 2021-06-07 ASSESSMENT — MIFFLIN-ST. JEOR: SCORE: 1022.86

## 2021-06-07 ASSESSMENT — PAIN SCALES - GENERAL: PAINLEVEL: NO PAIN (0)

## 2021-06-07 NOTE — PATIENT INSTRUCTIONS
1. Hold revlimid.  2. Continue weekly dexamethasone.  3. CT scan in 2 weeks.  4. Continue zometa every 3 months.  5. Follow-up after CT scan.    Please call to schedule

## 2021-06-07 NOTE — PROGRESS NOTES
"  Oncology Rooming Note    June 7, 2021 2:29 PM   Quiana Dunaway is a 85 year old female who presents for:    Chief Complaint   Patient presents with     Oncology Clinic Visit     Multiple myeloma not having achieved remission      Initial Vitals: There were no vitals taken for this visit. Estimated body mass index is 25.97 kg/m  as calculated from the following:    Height as of 4/22/21: 1.575 m (5' 2\").    Weight as of 6/1/21: 64.4 kg (142 lb). There is no height or weight on file to calculate BSA.  Data Unavailable Comment: Data Unavailable   No LMP recorded. Patient is postmenopausal.  Allergies reviewed: Yes  Medications reviewed: Yes    Medications: Medication refills not needed today.  Pharmacy name entered into Personal [facility]:    [facility] - [place], [place] - [address]  [id number] - [place], [place] - [address] AT [facility]    Clinical concerns: None       Farida Danielle MA            "

## 2021-06-10 ENCOUNTER — HOSPITAL ENCOUNTER (OUTPATIENT)
Dept: CT IMAGING | Facility: CLINIC | Age: 86
Discharge: HOME | End: 2021-06-10
Attending: INTERNAL MEDICINE | Admitting: INTERNAL MEDICINE
Payer: MEDICARE

## 2021-06-10 DIAGNOSIS — R63.4 WEIGHT LOSS: ICD-10-CM

## 2021-06-10 DIAGNOSIS — C90.00 MULTIPLE MYELOMA NOT HAVING ACHIEVED REMISSION (H): ICD-10-CM

## 2021-06-10 PROCEDURE — 250N000011 HC RX IP 250 OP 636: Performed by: INTERNAL MEDICINE

## 2021-06-10 PROCEDURE — 74177 CT ABD & PELVIS W/CONTRAST: CPT | Mod: MG

## 2021-06-10 PROCEDURE — 250N000009 HC RX 250: Performed by: INTERNAL MEDICINE

## 2021-06-10 RX ORDER — IOPAMIDOL 755 MG/ML
68 INJECTION, SOLUTION INTRAVASCULAR ONCE
Status: COMPLETED | OUTPATIENT
Start: 2021-06-10 | End: 2021-06-10

## 2021-06-10 RX ADMIN — IOPAMIDOL 68 ML: 755 INJECTION, SOLUTION INTRAVENOUS at 12:22

## 2021-06-10 RX ADMIN — SODIUM CHLORIDE 59 ML: 9 INJECTION, SOLUTION INTRAVENOUS at 12:22

## 2021-06-11 ENCOUNTER — TELEPHONE (OUTPATIENT)
Dept: ONCOLOGY | Facility: CLINIC | Age: 86
End: 2021-06-11

## 2021-06-11 NOTE — TELEPHONE ENCOUNTER
Pts daughter Regi munoztcb on  requesting callback to explain the CT results from yesterday. Will route to FAUSTINO Lambert RN

## 2021-06-11 NOTE — TELEPHONE ENCOUNTER
Writer returned call to Regi, and spoke with her about results. Patient has a dry cough and we discussed comfort  OTC medications. Patient does not have a  fever or any other symptoms at this time.     She will wait for a call from scheduling for an appointment to discuss further.    Alecia Briggs RN

## 2021-06-13 NOTE — PROGRESS NOTES
HEMATOLOGY HISTORY: Mrs. Dunaway is a lady with multiple myeloma (IgG kappa). High risk, t(14;16).  1. On 05/01/2017, WBC of 3.4, hemoglobin of 10.2 and platelet of 154.  2. SPEP on 07/07/2017 revealed M-spike of 1.8.  3. Bone marrow biopsy on 07/12/2017 reveals kappa monotypic plasma cell population consistent with multiple myeloma.  Bone marrow is 70% cellular.  Plasma cell is 50-60%.     -There is gain of chromosome 1, 5, 9, 15, and 17.  There is translocation 14;16.   4.  On 07/18/2017:  -M-spike of 1.9.   -Immunofixation reveals monoclonal IgG kappa.    -IgG level of 2970.  IgA of 15 and IgM of 175.    -Kappa free light chain of 67.7.  Lambda free light chain of 1.42.  Ratio of kappa to lambda of 47.71.    -Beta 2 microglobulin of 3.4.   5. PET scan on 07/24/2017 reveals several focal areas of increased FDG uptake consistent with multiple myeloma.  There is probable epithelial cyst in tail of the pancreas.  No associated abnormal FDG activity.  Left thyroid cysts or nodules without any FDG activity.  There is a 0.3 cm left upper lobe lung nodule.   8.  Velcade, Revlimid and dexamethasone between on 08/14/2017 and 04/30/2018. Velcade stopped.   -Revlimid and dexamethasone continued.    SUBJECTIVE:  Ms. Dunaway is an 85-year-old female with high risk myeloma on Revlimid and dexamethasone.  Lately she has been not feeling good.  The patient is more tired.  Appetite has been decreased.  She is losing weight.     She has some cough, but it is nonproductive.  Cough is more when she lies down.  She is likely from reflux.     No headache.  No dizziness.  No chest pain.  No shortness of breath at rest.  Gets short of breath too when she coughs.  No abdominal pain, nausea or vomiting.  No urinary bowel complaints.  No bleeding.  No fever or chills.     I discussed with her regarding decreased appetite.  The patient says she does not feel like eating.  She has no problems swallowing food.     PHYSICAL EXAMINATION:    GENERAL:  Alert and oriented x 3.   VITAL SIGNS:  Reviewed.  ECOG PS of 2.     EYES:  No icterus.   THROAT:  No ulcer. No thrush.   NECK:  Supple. No lymphadenopathy. No thyromegaly.   AXILLAE:  No lymphadenopathy.   LUNGS:  Good air entry bilaterally.  No crackles or wheezing.   HEART:  Regular.  No murmur.   GI: Abdomen is soft.  Nontender. No mass. Difficult palpation because of her weight.  EXTREMITIES:  No pedal edema.  No calf swelling or tenderness.   SKIN:  No rash.     LABORATORY:  CBC and CMP were reviewed.     ASSESSMENT:    1.  An 85-year-old female with multiple myeloma on Revlimid and dexamethasone.  2.  Unintentional weight loss.  3.  Decreased appetite.  4.  GERD.  5.  Fatigue.  6.  Cough is secondary to GERD.     PLAN:    1.  Discussed regarding her symptoms.  I am concerned regarding her decreased appetite, weight loss and fatigue.  This typically is a sign of progressive malignancy.  Other possibility is development of  another malignancy like lymphoma.     The patient has been on Revlimid and dexamethasone.  We will hold the Revlimid.  She will continue weekly dexamethasone.  I told her Revlimid can sometimes cause increased weakness.     I discussed regarding ruling out another malignancy like lymphoma.  We will get CT chest, abdomen and pelvis.     I discussed regarding her myeloma.  Overall, her myeloma has been stable.  I do not think there is worsening myeloma that is causing her problem.     2.  The patient encouraged to increase her calorie intake.  I advised her to eat more high-calorie diet.     3.  She gets Zometa every 3 months.  That will be continued.     4.  She has some cough.  This is from reflux.  She will be getting CT chest, abdomen and pelvis.  It will help us evaluate her lung.  She had a few questions, which were all answered.  I will see her after the CT scan.

## 2021-06-15 LAB
MDC_IDC_LEAD_IMPLANT_DT: NORMAL
MDC_IDC_LEAD_IMPLANT_DT: NORMAL
MDC_IDC_LEAD_LOCATION: NORMAL
MDC_IDC_LEAD_LOCATION: NORMAL
MDC_IDC_LEAD_LOCATION_DETAIL_1: NORMAL
MDC_IDC_LEAD_LOCATION_DETAIL_1: NORMAL
MDC_IDC_LEAD_MFG: NORMAL
MDC_IDC_LEAD_MFG: NORMAL
MDC_IDC_LEAD_MODEL: NORMAL
MDC_IDC_LEAD_MODEL: NORMAL
MDC_IDC_LEAD_POLARITY_TYPE: NORMAL
MDC_IDC_LEAD_POLARITY_TYPE: NORMAL
MDC_IDC_LEAD_SERIAL: NORMAL
MDC_IDC_LEAD_SERIAL: NORMAL
MDC_IDC_MSMT_BATTERY_DTM: NORMAL
MDC_IDC_MSMT_BATTERY_REMAINING_LONGEVITY: 52 MO
MDC_IDC_MSMT_BATTERY_RRT_TRIGGER: 2.83
MDC_IDC_MSMT_BATTERY_STATUS: NORMAL
MDC_IDC_MSMT_BATTERY_VOLTAGE: 3 V
MDC_IDC_MSMT_LEADCHNL_RA_IMPEDANCE_VALUE: 361 OHM
MDC_IDC_MSMT_LEADCHNL_RA_IMPEDANCE_VALUE: 380 OHM
MDC_IDC_MSMT_LEADCHNL_RA_PACING_THRESHOLD_AMPLITUDE: 0.5 V
MDC_IDC_MSMT_LEADCHNL_RA_PACING_THRESHOLD_PULSEWIDTH: 0.4 MS
MDC_IDC_MSMT_LEADCHNL_RA_SENSING_INTR_AMPL: 3.5 MV
MDC_IDC_MSMT_LEADCHNL_RA_SENSING_INTR_AMPL: 3.5 MV
MDC_IDC_MSMT_LEADCHNL_RV_IMPEDANCE_VALUE: 418 OHM
MDC_IDC_MSMT_LEADCHNL_RV_IMPEDANCE_VALUE: 513 OHM
MDC_IDC_MSMT_LEADCHNL_RV_PACING_THRESHOLD_AMPLITUDE: 1.25 V
MDC_IDC_MSMT_LEADCHNL_RV_PACING_THRESHOLD_PULSEWIDTH: 0.4 MS
MDC_IDC_MSMT_LEADCHNL_RV_SENSING_INTR_AMPL: 3.38 MV
MDC_IDC_MSMT_LEADCHNL_RV_SENSING_INTR_AMPL: 3.38 MV
MDC_IDC_PG_IMPLANT_DTM: NORMAL
MDC_IDC_PG_MFG: NORMAL
MDC_IDC_PG_MODEL: NORMAL
MDC_IDC_PG_SERIAL: NORMAL
MDC_IDC_PG_TYPE: NORMAL
MDC_IDC_SESS_CLINIC_NAME: NORMAL
MDC_IDC_SESS_DTM: NORMAL
MDC_IDC_SESS_TYPE: NORMAL
MDC_IDC_SET_BRADY_AT_MODE_SWITCH_RATE: 171 {BEATS}/MIN
MDC_IDC_SET_BRADY_HYSTRATE: NORMAL
MDC_IDC_SET_BRADY_LOWRATE: 60 {BEATS}/MIN
MDC_IDC_SET_BRADY_MAX_SENSOR_RATE: 120 {BEATS}/MIN
MDC_IDC_SET_BRADY_MAX_TRACKING_RATE: 120 {BEATS}/MIN
MDC_IDC_SET_BRADY_MODE: NORMAL
MDC_IDC_SET_BRADY_PAV_DELAY_LOW: 300 MS
MDC_IDC_SET_BRADY_SAV_DELAY_LOW: 300 MS
MDC_IDC_SET_LEADCHNL_RA_PACING_AMPLITUDE: 1.5 V
MDC_IDC_SET_LEADCHNL_RA_PACING_ANODE_ELECTRODE_1: NORMAL
MDC_IDC_SET_LEADCHNL_RA_PACING_ANODE_LOCATION_1: NORMAL
MDC_IDC_SET_LEADCHNL_RA_PACING_CAPTURE_MODE: NORMAL
MDC_IDC_SET_LEADCHNL_RA_PACING_CATHODE_ELECTRODE_1: NORMAL
MDC_IDC_SET_LEADCHNL_RA_PACING_CATHODE_LOCATION_1: NORMAL
MDC_IDC_SET_LEADCHNL_RA_PACING_POLARITY: NORMAL
MDC_IDC_SET_LEADCHNL_RA_PACING_PULSEWIDTH: 0.4 MS
MDC_IDC_SET_LEADCHNL_RA_SENSING_ANODE_ELECTRODE_1: NORMAL
MDC_IDC_SET_LEADCHNL_RA_SENSING_ANODE_LOCATION_1: NORMAL
MDC_IDC_SET_LEADCHNL_RA_SENSING_CATHODE_ELECTRODE_1: NORMAL
MDC_IDC_SET_LEADCHNL_RA_SENSING_CATHODE_LOCATION_1: NORMAL
MDC_IDC_SET_LEADCHNL_RA_SENSING_POLARITY: NORMAL
MDC_IDC_SET_LEADCHNL_RA_SENSING_SENSITIVITY: 0.3 MV
MDC_IDC_SET_LEADCHNL_RV_PACING_AMPLITUDE: 3.25 V
MDC_IDC_SET_LEADCHNL_RV_PACING_ANODE_ELECTRODE_1: NORMAL
MDC_IDC_SET_LEADCHNL_RV_PACING_ANODE_LOCATION_1: NORMAL
MDC_IDC_SET_LEADCHNL_RV_PACING_CAPTURE_MODE: NORMAL
MDC_IDC_SET_LEADCHNL_RV_PACING_CATHODE_ELECTRODE_1: NORMAL
MDC_IDC_SET_LEADCHNL_RV_PACING_CATHODE_LOCATION_1: NORMAL
MDC_IDC_SET_LEADCHNL_RV_PACING_POLARITY: NORMAL
MDC_IDC_SET_LEADCHNL_RV_PACING_PULSEWIDTH: 0.4 MS
MDC_IDC_SET_LEADCHNL_RV_SENSING_ANODE_ELECTRODE_1: NORMAL
MDC_IDC_SET_LEADCHNL_RV_SENSING_ANODE_LOCATION_1: NORMAL
MDC_IDC_SET_LEADCHNL_RV_SENSING_CATHODE_ELECTRODE_1: NORMAL
MDC_IDC_SET_LEADCHNL_RV_SENSING_CATHODE_LOCATION_1: NORMAL
MDC_IDC_SET_LEADCHNL_RV_SENSING_POLARITY: NORMAL
MDC_IDC_SET_LEADCHNL_RV_SENSING_SENSITIVITY: 0.9 MV
MDC_IDC_SET_ZONE_DETECTION_INTERVAL: 350 MS
MDC_IDC_SET_ZONE_DETECTION_INTERVAL: 400 MS
MDC_IDC_SET_ZONE_TYPE: NORMAL
MDC_IDC_STAT_AT_BURDEN_PERCENT: 0 %
MDC_IDC_STAT_AT_DTM_END: NORMAL
MDC_IDC_STAT_AT_DTM_START: NORMAL
MDC_IDC_STAT_BRADY_AP_VP_PERCENT: 41.82 %
MDC_IDC_STAT_BRADY_AP_VS_PERCENT: 31.33 %
MDC_IDC_STAT_BRADY_AS_VP_PERCENT: 16.85 %
MDC_IDC_STAT_BRADY_AS_VS_PERCENT: 10.01 %
MDC_IDC_STAT_BRADY_DTM_END: NORMAL
MDC_IDC_STAT_BRADY_DTM_START: NORMAL
MDC_IDC_STAT_BRADY_RA_PERCENT_PACED: 73.08 %
MDC_IDC_STAT_BRADY_RV_PERCENT_PACED: 58.58 %
MDC_IDC_STAT_EPISODE_RECENT_COUNT: 0
MDC_IDC_STAT_EPISODE_RECENT_COUNT_DTM_END: NORMAL
MDC_IDC_STAT_EPISODE_RECENT_COUNT_DTM_START: NORMAL
MDC_IDC_STAT_EPISODE_TOTAL_COUNT: 0
MDC_IDC_STAT_EPISODE_TOTAL_COUNT: 0
MDC_IDC_STAT_EPISODE_TOTAL_COUNT: 11
MDC_IDC_STAT_EPISODE_TOTAL_COUNT: 21
MDC_IDC_STAT_EPISODE_TOTAL_COUNT_DTM_END: NORMAL
MDC_IDC_STAT_EPISODE_TOTAL_COUNT_DTM_START: NORMAL
MDC_IDC_STAT_EPISODE_TYPE: NORMAL

## 2021-06-17 ENCOUNTER — VIRTUAL VISIT (OUTPATIENT)
Dept: ONCOLOGY | Facility: CLINIC | Age: 86
End: 2021-06-17
Attending: INTERNAL MEDICINE
Payer: MEDICARE

## 2021-06-17 DIAGNOSIS — R05.9 COUGH: ICD-10-CM

## 2021-06-17 DIAGNOSIS — N63.20 LEFT BREAST MASS: ICD-10-CM

## 2021-06-17 DIAGNOSIS — R91.8 ABNORMAL CT SCAN OF LUNG: ICD-10-CM

## 2021-06-17 DIAGNOSIS — C90.00 MULTIPLE MYELOMA NOT HAVING ACHIEVED REMISSION (H): Primary | ICD-10-CM

## 2021-06-17 PROCEDURE — 99214 OFFICE O/P EST MOD 30 MIN: CPT | Mod: 95 | Performed by: INTERNAL MEDICINE

## 2021-06-17 NOTE — LETTER
6/17/2021         RE: Quiana Dunaway  [address] [facility] [address]        Dear Colleague,    Thank you for referring your patient, Quiana Dunaway, to the [facility]. Please see a copy of my visit note below.    Quiana is a 85 year old who is being evaluated via a billable video visit.      How would you like to obtain your AVS? MyChart    Patient in virtual lobby    Will anyone else be joining your video visit? No      Video Start Time:   Video-Visit Details    Type of service:  Video Visit    Video End Time:    Originating Location (pt. Location): Home    Distant Location (provider location):  [facility]     Platform used for Video Visit: Scoupon    HEMATOLOGY HISTORY: Mrs. Dunaway is a lady with multiple myeloma (IgG kappa). High risk, t(14;16).  1. On 05/01/2017, WBC of 3.4, hemoglobin of 10.2 and platelet of 154.  2. SPEP on 07/07/2017 revealed M-spike of 1.8.  3. Bone marrow biopsy on 07/12/2017 reveals kappa monotypic plasma cell population consistent with multiple myeloma.  Bone marrow is 70% cellular.  Plasma cell is 50-60%.     -There is gain of chromosome 1, 5, 9, 15, and 17.  There is translocation 14;16.   4.  On 07/18/2017:  -M-spike of 1.9.   -Immunofixation reveals monoclonal IgG kappa.    -IgG level of 2970.  IgA of 15 and IgM of 175.    -Kappa free light chain of 67.7.  Lambda free light chain of 1.42.  Ratio of kappa to lambda of 47.71.    -Beta 2 microglobulin of 3.4.   5. PET scan on 07/24/2017 reveals several focal areas of increased FDG uptake consistent with multiple myeloma.  There is probable epithelial cyst in tail of the pancreas.  No associated abnormal FDG activity.  Left thyroid cysts or nodules without any FDG activity.  There is a 0.3 cm left upper lobe lung nodule.   8.  Velcade, Revlimid and dexamethasone between on 08/14/2017 and 04/30/2018. Velcade stopped.   -Revlimid and dexamethasone  continued.    SUBJECTIVE:  Ms. Dunaway is an 85-year-old female with multiple myeloma on Revlimid and dexamethasone.  I had seen her recently.  The patient was not feeling well.  Revlimid was put on hold.  Scans were ordered.     CT chest, abdomen and pelvis was done on 06/10/2021.  It reveals new nodular and more confluent opacities in lungs.  Could be infectious or inflammatory.  There is a medial left breast lesion, which is slowly increasing in size.     The patient is slightly better.  Her cough is less. Nonproductive.  No hemoptysis.  Her fatigue is less.  No headache.  No dizziness.  No chest pain.  No nausea or vomiting.  Appetite is fair.  No bleeding.     PHYSICAL EXAMINATION:    GENERAL:  She is alert, oriented x 3.  She is not in distress.  Rest of systems not examined.     ASSESSMENT:    1.  An 85-year-old female with multiple myeloma.  2.  Bilateral lung opacities.  3.  Left breast lesion.  4.  Fatigue, improving.     PLAN:    1.  CT scan was reviewed with the patient.  I explained to her there is some patchy infiltrate, likely to be inflammatory. I discussed regarding seeing a pulmonologist.  She is agreeable for it.  Appointment will be scheduled.  2.  Discussed regarding breast lesion.  We will schedule her for ultrasound and mammogram if needed.  Biopsies will be done if needed.  3.  For myeloma, she has been on Revlimid and dexamethasone.  Revlimid is on hold.  We will hold the Revlimid for another 2 weeks.  She can resume it in early July.  She is agreeable for this plan.  4.  Her fatigue is slowly improving.  Some of the fatigue could have been from Revlimid.  I am hoping that it improves further as she is going to be off Revlimid for another 2 weeks.  5.  She had a few questions, which were all answered.  I will see her end of July.  I advised her to call us with any questions or concerns.     Video visit time of 15 minutes.    This office note has been dictated.          Again, thank you for  allowing me to participate in the care of your patient.        Sincerely,        Alex Parry MD

## 2021-06-17 NOTE — PROGRESS NOTES
Quiana is a 85 year old who is being evaluated via a billable video visit.      How would you like to obtain your AVS? MyChart    Patient in virtual lobby    Will anyone else be joining your video visit? No      Video Start Time:   Video-Visit Details    Type of service:  Video Visit    Video End Time:    Originating Location (pt. Location): Home    Distant Location (provider location):  [facility]     Platform used for Video Visit: Tae

## 2021-06-17 NOTE — LETTER
6/17/2021         RE: Quiana Dunaway  [address] [facility] [address]        Dear Colleague,    Thank you for referring your patient, Quiana Dunaway, to the [facility]. Please see a copy of my visit note below.    Quiana is a 85 year old who is being evaluated via a billable video visit.      How would you like to obtain your AVS? MyChart    Patient in virtual lobby    Will anyone else be joining your video visit? No      Video Start Time:   Video-Visit Details    Type of service:  Video Visit    Video End Time:    Originating Location (pt. Location): Home    Distant Location (provider location):  [facility]     Platform used for Video Visit: Viewster    HEMATOLOGY HISTORY: Mrs. Dunaway is a lady with multiple myeloma (IgG kappa). High risk, t(14;16).  1. On 05/01/2017, WBC of 3.4, hemoglobin of 10.2 and platelet of 154.  2. SPEP on 07/07/2017 revealed M-spike of 1.8.  3. Bone marrow biopsy on 07/12/2017 reveals kappa monotypic plasma cell population consistent with multiple myeloma.  Bone marrow is 70% cellular.  Plasma cell is 50-60%.     -There is gain of chromosome 1, 5, 9, 15, and 17.  There is translocation 14;16.   4.  On 07/18/2017:  -M-spike of 1.9.   -Immunofixation reveals monoclonal IgG kappa.    -IgG level of 2970.  IgA of 15 and IgM of 175.    -Kappa free light chain of 67.7.  Lambda free light chain of 1.42.  Ratio of kappa to lambda of 47.71.    -Beta 2 microglobulin of 3.4.   5. PET scan on 07/24/2017 reveals several focal areas of increased FDG uptake consistent with multiple myeloma.  There is probable epithelial cyst in tail of the pancreas.  No associated abnormal FDG activity.  Left thyroid cysts or nodules without any FDG activity.  There is a 0.3 cm left upper lobe lung nodule.   8.  Velcade, Revlimid and dexamethasone between on 08/14/2017 and 04/30/2018. Velcade stopped.   -Revlimid and dexamethasone  continued.    SUBJECTIVE:  Ms. Dunaway is an 85-year-old female with multiple myeloma on Revlimid and dexamethasone.  I had seen her recently.  The patient was not feeling well.  Revlimid was put on hold.  Scans were ordered.     CT chest, abdomen and pelvis was done on 06/10/2021.  It reveals new nodular and more confluent opacities in lungs.  Could be infectious or inflammatory.  There is a medial left breast lesion, which is slowly increasing in size.     The patient is slightly better.  Her cough is less. Nonproductive.  No hemoptysis.  Her fatigue is less.  No headache.  No dizziness.  No chest pain.  No nausea or vomiting.  Appetite is fair.  No bleeding.     PHYSICAL EXAMINATION:    GENERAL:  She is alert, oriented x 3.  She is not in distress.  Rest of systems not examined.     ASSESSMENT:    1.  An 85-year-old female with multiple myeloma.  2.  Bilateral lung opacities.  3.  Left breast lesion.  4.  Fatigue, improving.     PLAN:    1.  CT scan was reviewed with the patient.  I explained to her there is some patchy infiltrate, likely to be inflammatory. I discussed regarding seeing a pulmonologist.  She is agreeable for it.  Appointment will be scheduled.  2.  Discussed regarding breast lesion.  We will schedule her for ultrasound and mammogram if needed.  Biopsies will be done if needed.  3.  For myeloma, she has been on Revlimid and dexamethasone.  Revlimid is on hold.  We will hold the Revlimid for another 2 weeks.  She can resume it in early July.  She is agreeable for this plan.  4.  Her fatigue is slowly improving.  Some of the fatigue could have been from Revlimid.  I am hoping that it improves further as she is going to be off Revlimid for another 2 weeks.  5.  She had a few questions, which were all answered.  I will see her end of July.  I advised her to call us with any questions or concerns.     Video visit time of 15 minutes.    This office note has been dictated.          Again, thank you for  allowing me to participate in the care of your patient.        Sincerely,        lAex Parry MD

## 2021-06-17 NOTE — PATIENT INSTRUCTIONS
1.  Hold Revlimid.  Restart in first week of July.  2.  Pulmonary consult.  3.  Left breast mammogram and ultrasound.  4.  See me in 6 weeks with labs.  Labs as per treatment plan.    Please call to schedule

## 2021-06-26 NOTE — PROGRESS NOTES
HEMATOLOGY HISTORY: Mrs. Dunaway is a lady with multiple myeloma (IgG kappa). High risk, t(14;16).  1. On 05/01/2017, WBC of 3.4, hemoglobin of 10.2 and platelet of 154.  2. SPEP on 07/07/2017 revealed M-spike of 1.8.  3. Bone marrow biopsy on 07/12/2017 reveals kappa monotypic plasma cell population consistent with multiple myeloma.  Bone marrow is 70% cellular.  Plasma cell is 50-60%.     -There is gain of chromosome 1, 5, 9, 15, and 17.  There is translocation 14;16.   4.  On 07/18/2017:  -M-spike of 1.9.   -Immunofixation reveals monoclonal IgG kappa.    -IgG level of 2970.  IgA of 15 and IgM of 175.    -Kappa free light chain of 67.7.  Lambda free light chain of 1.42.  Ratio of kappa to lambda of 47.71.    -Beta 2 microglobulin of 3.4.   5. PET scan on 07/24/2017 reveals several focal areas of increased FDG uptake consistent with multiple myeloma.  There is probable epithelial cyst in tail of the pancreas.  No associated abnormal FDG activity.  Left thyroid cysts or nodules without any FDG activity.  There is a 0.3 cm left upper lobe lung nodule.   8.  Velcade, Revlimid and dexamethasone between on 08/14/2017 and 04/30/2018. Velcade stopped.   -Revlimid and dexamethasone continued.    SUBJECTIVE:  Ms. Dunaway is an 85-year-old female with multiple myeloma on Revlimid and dexamethasone.  I had seen her recently.  The patient was not feeling well.  Revlimid was put on hold.  Scans were ordered.     CT chest, abdomen and pelvis was done on 06/10/2021.  It reveals new nodular and more confluent opacities in lungs.  Could be infectious or inflammatory.  There is a medial left breast lesion, which is slowly increasing in size.     The patient is slightly better.  Her cough is less. Nonproductive.  No hemoptysis.  Her fatigue is less.  No headache.  No dizziness.  No chest pain.  No nausea or vomiting.  Appetite is fair.  No bleeding.     PHYSICAL EXAMINATION:    GENERAL:  She is alert, oriented x 3.  She is not in  distress.  Rest of systems not examined.     ASSESSMENT:    1.  An 85-year-old female with multiple myeloma.  2.  Bilateral lung opacities.  3.  Left breast lesion.  4.  Fatigue, improving.     PLAN:    1.  CT scan was reviewed with the patient.  I explained to her there is some patchy infiltrate, likely to be inflammatory. I discussed regarding seeing a pulmonologist.  She is agreeable for it.  Appointment will be scheduled.  2.  Discussed regarding breast lesion.  We will schedule her for ultrasound and mammogram if needed.  Biopsies will be done if needed.  3.  For myeloma, she has been on Revlimid and dexamethasone.  Revlimid is on hold.  We will hold the Revlimid for another 2 weeks.  She can resume it in early July.  She is agreeable for this plan.  4.  Her fatigue is slowly improving.  Some of the fatigue could have been from Revlimid.  I am hoping that it improves further as she is going to be off Revlimid for another 2 weeks.  5.  She had a few questions, which were all answered.  I will see her end of July.  I advised her to call us with any questions or concerns.     Video visit time of 15 minutes.

## 2021-07-05 ENCOUNTER — OFFICE VISIT (OUTPATIENT)
Dept: FAMILY MEDICINE | Facility: CLINIC | Age: 86
End: 2021-07-05
Payer: MEDICARE

## 2021-07-05 VITALS
BODY MASS INDEX: 25.95 KG/M2 | WEIGHT: 141 LBS | SYSTOLIC BLOOD PRESSURE: 125 MMHG | DIASTOLIC BLOOD PRESSURE: 76 MMHG | HEART RATE: 86 BPM | OXYGEN SATURATION: 95 % | HEIGHT: 62 IN | TEMPERATURE: 98.7 F

## 2021-07-05 DIAGNOSIS — I50.32 CHRONIC DIASTOLIC CONGESTIVE HEART FAILURE (H): ICD-10-CM

## 2021-07-05 DIAGNOSIS — I10 BENIGN ESSENTIAL HYPERTENSION: ICD-10-CM

## 2021-07-05 DIAGNOSIS — R91.8 SHADOW OF LUNG: ICD-10-CM

## 2021-07-05 DIAGNOSIS — C90.00 MULTIPLE MYELOMA NOT HAVING ACHIEVED REMISSION (H): Primary | ICD-10-CM

## 2021-07-05 DIAGNOSIS — N63.0 LUMP OR MASS IN BREAST: ICD-10-CM

## 2021-07-05 PROCEDURE — 99214 OFFICE O/P EST MOD 30 MIN: CPT | Performed by: INTERNAL MEDICINE

## 2021-07-05 ASSESSMENT — MIFFLIN-ST. JEOR: SCORE: 1042.82

## 2021-07-05 NOTE — PROGRESS NOTES
Assessment & Plan     Multiple myeloma not having achieved remission (H)  Continue follow-up with hematology oncology as directed    Shadow of lung  Unclear etiology, cough symptoms are very mild and intermittent and improving although they are persistent since April  Opacities on CT scan are nonspecific and thought to be infectious or inflammatory in etiology by radiology  I agree with pulmonary consult to get a better handle of what might be causing these opacities, perhaps serial imaging would be the best way to evaluate further?    Lump or mass in breast  This mass was present in May of 2020.  However, it has grown slightly larger when last scan in June 2021.  She did have a diagnostic mammogram last summer showing a sebaceous cyst in the left breast.      Benign essential hypertension  This is under good control    Chronic diastolic congestive heart failure (H)  Her volume status seems to be stable on examination today.        30 minutes spent on the date of the encounter doing chart review, history and exam, documentation and further activities per the note        Return in about 2 months (around 9/5/2021).  Patient instructed to return to clinic or contact us sooner if symptoms worsen or new symptoms develop.     César Chung MD  [facility] MAGALY Araya is a 85 year old who presents for the following health issues     HPI   Very pleasant 85-year-old female with a history of multiple myeloma, hypertension, anxiety, CKD, diastolic congestive heart failure, pacemaker in place, overactive bladder.  She developed a cough in April.  Her x-ray was negative.  Her cough improved, but persists and is rare and intermittent and mild  The cough is not productive of sputum  She had noted some weight loss which alarmed her oncologist and he ordered a CT of the chest abdomen pelvis  The CT demonstrated diffuse infiltrates in her lungs consistent with an inflammatory or infectious  "etiology  Since then, she has gained some weight back  The patient was referred to pulmonary for further evaluation  There is also a breast mass identified, and the patient was referred for an ultrasound to further evaluate, but the patient declines this stating that she has had this finding in the past  She is most concerned today about mild fatigue and a persistent cough  She is planning to travel to [place] for a wedding, and she wants to know if she needs to change her travel plans  She is accompanied by her daughter-in-law as per her usual routine      Review of Systems         Objective    /76 (BP Location: Left arm, Cuff Size: Adult Regular)   Pulse 86   Temp 98.7  F (37.1  C) (Tympanic)   Ht 1.575 m (5' 2\")   Wt 64 kg (141 lb)   SpO2 95%   BMI 25.79 kg/m    Body mass index is 25.79 kg/m .  Physical Exam   General: This is a well-appearing, stable appearing elderly female in no acute distress.  She is in very good spirits today.  HEENT: The nasal exam and posterior pharynx appear normal, the neck is supple without adenopathy  Cardiovascular: The heart has a regular rate and rhythm  Pulmonary: The lungs are clear to auscultation today, breathing is not labored, no crackles, patient speaks in full sentences  Extremities: Without edema clubbing or cyanosis  Neurological: Alert and oriented to person place and time, cranial nerves II to XII are grossly intact, walks with a cane  Mental status: Unchanged mildly anxious affect, well-groomed, normal speech                "

## 2021-07-06 ENCOUNTER — TELEPHONE (OUTPATIENT)
Dept: PHARMACY | Facility: CLINIC | Age: 86
End: 2021-07-06

## 2021-07-06 NOTE — TELEPHONE ENCOUNTER
Oral Chemotherapy Program    Verified with Quiana that she restarted her Revlimid on 7/1/2021.  Her daughter will be making a follow-up appt for her for labs and a visit with Dr Parry at the end of July.    Martinez Gallardo, PharmD, BCOP  July 6, 2021

## 2021-07-07 ENCOUNTER — VIRTUAL VISIT (OUTPATIENT)
Dept: PULMONOLOGY | Facility: CLINIC | Age: 86
End: 2021-07-07
Attending: INTERNAL MEDICINE
Payer: MEDICARE

## 2021-07-07 ENCOUNTER — TELEPHONE (OUTPATIENT)
Dept: ONCOLOGY | Facility: CLINIC | Age: 86
End: 2021-07-07

## 2021-07-07 DIAGNOSIS — R91.8 PULMONARY INFILTRATE: Primary | ICD-10-CM

## 2021-07-07 PROCEDURE — 99204 OFFICE O/P NEW MOD 45 MIN: CPT | Mod: 95 | Performed by: INTERNAL MEDICINE

## 2021-07-07 RX ORDER — DEXAMETHASONE 4 MG/1
4 TABLET ORAL WEEKLY
Qty: 15 TABLET | Refills: 0 | Status: SHIPPED | OUTPATIENT
Start: 2021-07-07 | End: 2021-08-25

## 2021-07-07 RX ORDER — LENALIDOMIDE 10 MG/1
10 CAPSULE ORAL DAILY
Qty: 14 CAPSULE | Refills: 0 | Status: SHIPPED | OUTPATIENT
Start: 2021-07-07 | End: 2021-08-25

## 2021-07-07 NOTE — TELEPHONE ENCOUNTER
Oral Chemotherapy Monitoring Program    Subjective/Objective:  Quiana Dunaway is a 85 year old female contacted by phone for a follow-up visit for oral chemotherapy.     ORAL CHEMOTHERAPY 1/22/2021 2/1/2021 3/9/2021 4/12/2021 4/16/2021 5/5/2021 7/7/2021   Assessment Type - Lab Monitoring Lab Monitoring Lab Monitoring Refill Lab Monitoring Quarterly Follow up   Diagnosis Code Multiple Myeloma Multiple Myeloma Multiple Myeloma Multiple Myeloma Multiple Myeloma Multiple Myeloma Multiple Myeloma   Providers Sohan Parry   Clinic Name/Location [facility]   Drug Name Revlimid (Lenalidomide) Revlimid (Lenalidomide) Revlimid (Lenalidomide) Revlimid (lenalidomide) Revlimid (lenalidomide) Revlimid (lenalidomide) Revlimid (lenalidomide)   Dose 10 mg 10 mg 10 mg 10 mg 10 mg 10 mg 10 mg   Current Schedule Daily Daily Daily Daily Daily Daily Daily   Cycle Details 2 weeks on, 1 week off 2 weeks on, 1 week off 2 weeks on, 1 week off 2 weeks on, 1 week off 2 weeks on, 1 week off 2 weeks on, 1 week off 2 weeks on, 1 week off   Start Date of Last Cycle 12/30/2020 1/28/2021 2/18/2021 4/1/2021 4/1/2021 4/22/2021 6/4/2021   Planned next cycle start date 1/26/2021 2/18/2021 3/11/2021 4/22/2021 4/22/2021 5/13/2021 7/1/2021   Doses missed in last 2 weeks - - - - - - -   Adherence Assessment - - - - - - Adherent   Adverse Effects No AE identified during assessment - - No AE identified during assessment - No AE identified during assessment No AE identified during assessment   Fatigue - - - - - - -   Pharmacist Intervention(fatigue) - - - - - - -   Home BPs - - - - - - not needed   Any new drug interactions? No - - - - - No   Is the dose as ordered appropriate for the patient? Yes - - - - - Yes   Is the patient currently in pain? - - - - - - -   Has the patient been assessed within the past 6 months for depression? - - - - - - -   Has the patient  "missed any days of school, work, or other routine activity? - - - - - - -   Since the last time we talked, have you been hospitalized or used the emergency room? - - - - - - -       Vitals:  BP:   BP Readings from Last 1 Encounters:   07/05/21 125/76     Wt Readings from Last 1 Encounters:   07/05/21 64 kg (141 lb)     Estimated body surface area is 1.67 meters squared as calculated from the following:    Height as of 7/5/21: 1.575 m (5' 2\").    Weight as of 7/5/21: 64 kg (141 lb).    Labs:  No results found for NA within last 30 days.     No results found for K within last 30 days.     No results found for CA within last 30 days.     No results found for Mag within last 30 days.     No results found for Phos within last 30 days.     No results found for ALBUMIN within last 30 days.     No results found for BUN within last 30 days.     No results found for CR within last 30 days.       No results found for AST within last 30 days.     No results found for ALT within last 30 days.     No results found for BILITOTAL within last 30 days.       No results found for WBC within last 30 days.     No results found for HGB within last 30 days.     No results found for PLT within last 30 days.     No results found for ANC within last 30 days.       Rosy Sandy, Pharm D.  [facility]  [address] [place] [id number]  Mail Order [phone]  Specialty [phone]      "

## 2021-07-07 NOTE — LETTER
7/7/2021       RE: Quiana Dunaway  [address] [facility] [address]     Dear Colleague,    Thank you for referring your patient, Quiana Dunaway, to the [facility] at [facility]. Please see a copy of my visit note below.    [facility]  Interventional Pulmonary Clinic Virtual Visit Note    July 7, 2021    Chief complaint:  Quiana Dunaway is a 85 year old female seen for abnormal ct chest    Reason for clinic visit / Chief complaint:   Abnormal CT chest    Assessment and Plan:  Abnormal CT chest, multiple areas of infiltration mostly in the right lung right upper and lower lobes on a CT chest done on Cortney 10, 2021.  Patient has history of heavy cough in May with no phlegm production.  She was not treated with antibiotics at that time.  She received her Covid vaccine in March 2021.  We discussed possible etiologies of her CT findings.  We will repeat CT chest in 1 week and see her in the clinic 1 more time to determine further steps.  She is in agreement with the plan.  Multiple myeloma currently on Revlimid and dexamethasone      Billing: Based on Medical Decision Making (Complexity):  L4    History of Present Illness:  This is an 85 years old woman with diagnosis of multiple myeloma recently experienced nonproductive cough which significantly improved currently she denied having other symptoms suggesting infection (no chest pain, shortness of breath, weight loss, night sweats, fever or chills) her CT chest revealed multiple areas of infiltrations which are new when compared to previous CT scan from 2020.  She is a lifetime non-smoker no history of exposure to chemicals radiation or asbestos.  She has no known pulmonary problems in the past.  She has no history of aspiration while eating or drinking.  When she is on treatment for multiple myeloma for the last 4 years same medications as indicated above.        HEMATOLOGY  HISTORY:  Multiple myeloma (IgG kappa). High risk, t(14;16).  1. On 05/01/2017, WBC of 3.4, hemoglobin of 10.2 and platelet of 154.  2. SPEP on 07/07/2017 revealed M-spike of 1.8.  3. Bone marrow biopsy on 07/12/2017 reveals kappa monotypic plasma cell population consistent with multiple myeloma.  Bone marrow is 70% cellular.  Plasma cell is 50-60%.     -There is gain of chromosome 1, 5, 9, 15, and 17.  There is translocation 14;16.   4.  On 07/18/2017:  -M-spike of 1.9.   -Immunofixation reveals monoclonal IgG kappa.    -IgG level of 2970.  IgA of 15 and IgM of 175.    -Kappa free light chain of 67.7.  Lambda free light chain of 1.42.  Ratio of kappa to lambda of 47.71.    -Beta 2 microglobulin of 3.4.   5. PET scan on 07/24/2017 reveals several focal areas of increased FDG uptake consistent with multiple myeloma.  There is probable epithelial cyst in tail of the pancreas.  No associated abnormal FDG activity.  Left thyroid cysts or nodules without any FDG activity.  There is a 0.3 cm left upper lobe lung nodule.   8.  Velcade, Revlimid and dexamethasone between on 08/14/2017 and 04/30/2018. Velcade stopped.   -Revlimid and dexamethasone continued.       Allergies   Allergen Reactions     Levaquin [Levofloxacin] Other (See Comments)     Tingling in feet after 1 dose on 8/7/19          Past Medical History:   Diagnosis Date     Anxiety      HTN (hypertension)      Multiple myeloma not having achieved remission (H) 7/18/2017     Pacemaker     Placed due to symptomatic second degree AV Block     Pancytopenia (H) 6/28/2017     Paroxysmal atrial fibrillation (H)         Past Surgical History:   Procedure Laterality Date     ------------OTHER-------------      cataract eye surgery     BONE MARROW BIOPSY, BONE SPECIMEN, NEEDLE/TROCAR N/A 7/12/2017    Procedure: BIOPSY BONE MARROW;  BONE MARROW BIOPSY ;  Surgeon: Grace Vela MD;  Location:  GI     IMPLANT PACEMAKER  03/2017    AV block        Social History      Socioeconomic History     Marital status:      Spouse name: Not on file     Number of children: Not on file     Years of education: Not on file     Highest education level: Not on file   Occupational History     Not on file   Social Needs     Financial resource strain: Not hard at all     Food insecurity     Worry: Never true     Inability: Never true     Transportation needs     Medical: No     Non-medical: No   Tobacco Use     Smoking status: Never Smoker     Smokeless tobacco: Never Used   Substance and Sexual Activity     Alcohol use: No     Alcohol/week: 0.0 standard drinks     Drug use: No     Sexual activity: Never   Lifestyle     Physical activity     Days per week: 0 days     Minutes per session: 0 min     Stress: Not at all   Relationships     Social connections     Talks on phone: Twice a week     Gets together: Twice a week     Attends Muslim service: More than 4 times per year     Active member of club or organization: Yes     Attends meetings of clubs or organizations: Never     Relationship status: Not on file     Intimate partner violence     Fear of current or ex partner: Not on file     Emotionally abused: Not on file     Physically abused: Not on file     Forced sexual activity: Not on file   Other Topics Concern     Parent/sibling w/ CABG, MI or angioplasty before 65F 55M? Not Asked   Social History Narrative     Not on file        Family History   Problem Relation Age of Onset     Unknown/Adopted Mother      Unknown/Adopted Father         Immunization History   Administered Date(s) Administered     COVID-19,PF,Moderna 01/20/2021, 02/17/2021     HepA-Adult 05/19/2004, 03/20/2008     Influenza (High Dose) 3 valent vaccine 10/10/2017, 10/08/2018, 10/03/2019     Influenza (IIV3) PF 10/29/2003, 10/22/2004, 10/20/2005, 10/31/2006     Influenza, Quad, High Dose, Pf, 65yr + 10/07/2020     Pneumo Conj 13-V (2010&after) 10/10/2015     Pneumococcal 23 valent 11/01/2010     TD (ADULT, 7+)  06/01/2021     TDAP Vaccine (Adacel) 11/01/2010       Current Outpatient Medications   Medication Sig     acyclovir (ZOVIRAX) 400 MG tablet Take 1 tablet (400 mg) by mouth 2 times daily Viral Prophylaxis.     aspirin 81 MG chewable tablet Take 81 mg by mouth daily      BETA BLOCKER NOT PRESCRIBED (INTENTIONAL) Please choose reason not prescribed from choices below. (Patient not taking: Reported on 7/5/2021)     dexamethasone (DECADRON) 4 MG tablet Take 1 tablet (4 mg) by mouth once a week Once a week with food on Days 1,8,15.     dexamethasone (DECADRON) 4 MG tablet Take 1 tablet (4 mg) by mouth once a week Once a week with food on Days 1,8,15.     estradiol (ESTRACE VAGINAL) 0.1 MG/GM vaginal cream Apply small amount to the vaginal opening and urethra M, W, F @ h.s.     furosemide (LASIX) 20 MG tablet Take 0.5 tablets (10 mg) by mouth daily as needed (For BP greater than 160/90)     LENalidomide (REVLIMID) 10 MG CAPS capsule Take 1 capsule (10 mg) by mouth daily for 14 days Take on Days 1 through 14 of 21-day cycle.     LENalidomide (REVLIMID) 10 MG CAPS capsule Take 1 capsule (10 mg) by mouth daily for 14 days Take on Days 1 through 14 of 21-day cycle.     LENalidomide (REVLIMID) 10 MG CAPS capsule Take 1 capsule (10 mg) by mouth daily for 14 days Take on Days 1 through 14 of 21-day cycle.     LENalidomide (REVLIMID) 10 MG CAPS capsule Take 1 capsule (10 mg) by mouth daily for 14 days Take on Days 1 through 14 of 21-day cycle.     LENalidomide (REVLIMID) 10 MG CAPS capsule Take 1 capsule (10 mg) by mouth daily for 14 days Take on Days 1 through 14 of 21-day cycle.     lisinopril (ZESTRIL) 5 MG tablet Take 1 tablet (5 mg) by mouth daily     Loperamide HCl (IMODIUM A-D PO) Take 1 tablet by mouth as needed      LORazepam (ATIVAN) 0.5 MG tablet Take 1 tablet (0.5 mg) by mouth every 6 hours as needed for anxiety     mirabegron (MYRBETRIQ) 50 MG 24 hr tablet Take 1 tablet (50 mg) by mouth daily     nitroglycerin  (NITROSTAT) 0.4 MG sublingual tablet For chest pain place 1 tablet under the tongue every 5 minutes for 3 doses. If symptoms persist 5 minutes after 1st dose call 911.     potassium chloride ER (KLOR-CON M) 10 MEQ CR tablet Take 1 tablet (10 mEq) by mouth 2 times daily     prochlorperazine (COMPAZINE) 10 MG tablet Take 1 tablet (10 mg) by mouth every 6 hours as needed (Nausea/Vomiting)     triamcinolone (KENALOG) 0.1 % paste Apply to tongue lesion twice daily for one week.     vitamin B complex with vitamin C (VITAMIN  B COMPLEX) tablet Take 1 tablet by mouth daily     vitamin D3 (CHOLECALCIFEROL) 50 mcg (2000 units) tablet Take 1 tablet by mouth daily     zinc gluconate 50 MG tablet Take 50 mg by mouth daily     Zoledronic Acid (ZOMETA IV) Inject 4 mg into the vein every 3 months      No current facility-administered medications for this visit.         Review of Systems:  I have done 10 points of review systems and all negative except for those mentioned in HPI    Physical examination  Constitutional: Oriented, not in distress  Head and neck: normal posture and movements  Respiratory: Normal tidal breathing, no shortness of breath, no audible wheezing or stridor over the phone or video visit  Neurological: Alert, orientedx3  Psychiatric:  Mood and affect are appropriate with insight into his/her medical condition    Data:  Lab Results   Component Value Date    WBC 3.9 06/02/2021     Lab Results   Component Value Date    RBC 3.73 06/02/2021     Lab Results   Component Value Date    HGB 11.8 06/02/2021     Lab Results   Component Value Date    HCT 35.4 06/02/2021     Lab Results   Component Value Date    MCV 95 06/02/2021     Lab Results   Component Value Date    MCH 31.6 06/02/2021     Lab Results   Component Value Date    MCHC 33.3 06/02/2021     Lab Results   Component Value Date    RDW 13.2 06/02/2021     Lab Results   Component Value Date     06/02/2021       Lab Results   Component Value Date      06/02/2021      Lab Results   Component Value Date    POTASSIUM 3.7 06/02/2021     Lab Results   Component Value Date    CHLORIDE 108 06/02/2021     Lab Results   Component Value Date    HUMBERTO 8.8 06/02/2021     Lab Results   Component Value Date    CO2 30 06/02/2021     Lab Results   Component Value Date    BUN 23 06/02/2021     Lab Results   Component Value Date    CR 0.67 06/02/2021     Lab Results   Component Value Date    GLC 83 06/02/2021         CANELO Marc MD

## 2021-07-07 NOTE — PROGRESS NOTES
Quiana is a 85 year old who is being evaluated via a billable video visit.      How would you like to obtain your AVS? MyChart     If the video visit is dropped, the invitation should be resent by: Text to cell phone: [phone]     Will anyone else be joining your video visit? Kateryna          Addison Jang LPN    Video-Visit Details    Type of service:  Video Visit    Video Time: 18 minutes    Originating Location (pt. Location): Home    Distant Location (provider location):  [facility]     Platform used for Video Visit: [facility]  Interventional Pulmonary Clinic Virtual Visit Note    July 7, 2021    Chief complaint:  Quiana Dunaway is a 85 year old female seen for abnormal ct chest    Reason for clinic visit / Chief complaint:   Abnormal CT chest    Assessment and Plan:  Abnormal CT chest, multiple areas of infiltration mostly in the right lung right upper and lower lobes on a CT chest done on Cortney 10, 2021.  Patient has history of heavy cough in May with no phlegm production.  She was not treated with antibiotics at that time.  She received her Covid vaccine in March 2021.  We discussed possible etiologies of her CT findings.  We will repeat CT chest in 1 week and see her in the clinic 1 more time to determine further steps.  She is in agreement with the plan.  Multiple myeloma currently on Revlimid and dexamethasone      Billing: Based on Medical Decision Making (Complexity):  L4    History of Present Illness:  This is an 85 years old woman with diagnosis of multiple myeloma recently experienced nonproductive cough which significantly improved currently she denied having other symptoms suggesting infection (no chest pain, shortness of breath, weight loss, night sweats, fever or chills) her CT chest revealed multiple areas of infiltrations which are new when compared to previous CT scan from 2020.  She is a lifetime non-smoker no history of exposure to chemicals  radiation or asbestos.  She has no known pulmonary problems in the past.  She has no history of aspiration while eating or drinking.  When she is on treatment for multiple myeloma for the last 4 years same medications as indicated above.        HEMATOLOGY HISTORY:  Multiple myeloma (IgG kappa). High risk, t(14;16).  1. On 05/01/2017, WBC of 3.4, hemoglobin of 10.2 and platelet of 154.  2. SPEP on 07/07/2017 revealed M-spike of 1.8.  3. Bone marrow biopsy on 07/12/2017 reveals kappa monotypic plasma cell population consistent with multiple myeloma.  Bone marrow is 70% cellular.  Plasma cell is 50-60%.     -There is gain of chromosome 1, 5, 9, 15, and 17.  There is translocation 14;16.   4.  On 07/18/2017:  -M-spike of 1.9.   -Immunofixation reveals monoclonal IgG kappa.    -IgG level of 2970.  IgA of 15 and IgM of 175.    -Kappa free light chain of 67.7.  Lambda free light chain of 1.42.  Ratio of kappa to lambda of 47.71.    -Beta 2 microglobulin of 3.4.   5. PET scan on 07/24/2017 reveals several focal areas of increased FDG uptake consistent with multiple myeloma.  There is probable epithelial cyst in tail of the pancreas.  No associated abnormal FDG activity.  Left thyroid cysts or nodules without any FDG activity.  There is a 0.3 cm left upper lobe lung nodule.   8.  Velcade, Revlimid and dexamethasone between on 08/14/2017 and 04/30/2018. Velcade stopped.   -Revlimid and dexamethasone continued.       Allergies   Allergen Reactions     Levaquin [Levofloxacin] Other (See Comments)     Tingling in feet after 1 dose on 8/7/19          Past Medical History:   Diagnosis Date     Anxiety      HTN (hypertension)      Multiple myeloma not having achieved remission (H) 7/18/2017     Pacemaker     Placed due to symptomatic second degree AV Block     Pancytopenia (H) 6/28/2017     Paroxysmal atrial fibrillation (H)         Past Surgical History:   Procedure Laterality Date     ------------OTHER-------------      cataract  eye surgery     BONE MARROW BIOPSY, BONE SPECIMEN, NEEDLE/TROCAR N/A 7/12/2017    Procedure: BIOPSY BONE MARROW;  BONE MARROW BIOPSY ;  Surgeon: Grace Vela MD;  Location:  GI     IMPLANT PACEMAKER  03/2017    AV block        Social History     Socioeconomic History     Marital status:      Spouse name: Not on file     Number of children: Not on file     Years of education: Not on file     Highest education level: Not on file   Occupational History     Not on file   Social Needs     Financial resource strain: Not hard at all     Food insecurity     Worry: Never true     Inability: Never true     Transportation needs     Medical: No     Non-medical: No   Tobacco Use     Smoking status: Never Smoker     Smokeless tobacco: Never Used   Substance and Sexual Activity     Alcohol use: No     Alcohol/week: 0.0 standard drinks     Drug use: No     Sexual activity: Never   Lifestyle     Physical activity     Days per week: 0 days     Minutes per session: 0 min     Stress: Not at all   Relationships     Social connections     Talks on phone: Twice a week     Gets together: Twice a week     Attends [employer] service: More than 4 times per year     Active member of club or organization: Yes     Attends meetings of clubs or organizations: Never     Relationship status: Not on file     Intimate partner violence     Fear of current or ex partner: Not on file     Emotionally abused: Not on file     Physically abused: Not on file     Forced sexual activity: Not on file   Other Topics Concern     Parent/sibling w/ CABG, MI or angioplasty before 65F 55M? Not Asked   Social History Narrative     Not on file        Family History   Problem Relation Age of Onset     Unknown/Adopted Mother      Unknown/Adopted Father         Immunization History   Administered Date(s) Administered     COVID-19,PF,Moderna 01/20/2021, 02/17/2021     HepA-Adult 05/19/2004, 03/20/2008     Influenza (High Dose) 3 valent vaccine  10/10/2017, 10/08/2018, 10/03/2019     Influenza (IIV3) PF 10/29/2003, 10/22/2004, 10/20/2005, 10/31/2006     Influenza, Quad, High Dose, Pf, 65yr + 10/07/2020     Pneumo Conj 13-V (2010&after) 10/10/2015     Pneumococcal 23 valent 11/01/2010     TD (ADULT, 7+) 06/01/2021     TDAP Vaccine (Adacel) 11/01/2010       Current Outpatient Medications   Medication Sig     acyclovir (ZOVIRAX) 400 MG tablet Take 1 tablet (400 mg) by mouth 2 times daily Viral Prophylaxis.     aspirin 81 MG chewable tablet Take 81 mg by mouth daily      BETA BLOCKER NOT PRESCRIBED (INTENTIONAL) Please choose reason not prescribed from choices below. (Patient not taking: Reported on 7/5/2021)     dexamethasone (DECADRON) 4 MG tablet Take 1 tablet (4 mg) by mouth once a week Once a week with food on Days 1,8,15.     dexamethasone (DECADRON) 4 MG tablet Take 1 tablet (4 mg) by mouth once a week Once a week with food on Days 1,8,15.     estradiol (ESTRACE VAGINAL) 0.1 MG/GM vaginal cream Apply small amount to the vaginal opening and urethra M, W, F @ h.s.     furosemide (LASIX) 20 MG tablet Take 0.5 tablets (10 mg) by mouth daily as needed (For BP greater than 160/90)     LENalidomide (REVLIMID) 10 MG CAPS capsule Take 1 capsule (10 mg) by mouth daily for 14 days Take on Days 1 through 14 of 21-day cycle.     LENalidomide (REVLIMID) 10 MG CAPS capsule Take 1 capsule (10 mg) by mouth daily for 14 days Take on Days 1 through 14 of 21-day cycle.     LENalidomide (REVLIMID) 10 MG CAPS capsule Take 1 capsule (10 mg) by mouth daily for 14 days Take on Days 1 through 14 of 21-day cycle.     LENalidomide (REVLIMID) 10 MG CAPS capsule Take 1 capsule (10 mg) by mouth daily for 14 days Take on Days 1 through 14 of 21-day cycle.     LENalidomide (REVLIMID) 10 MG CAPS capsule Take 1 capsule (10 mg) by mouth daily for 14 days Take on Days 1 through 14 of 21-day cycle.     lisinopril (ZESTRIL) 5 MG tablet Take 1 tablet (5 mg) by mouth daily     Loperamide HCl  (IMODIUM A-D PO) Take 1 tablet by mouth as needed      LORazepam (ATIVAN) 0.5 MG tablet Take 1 tablet (0.5 mg) by mouth every 6 hours as needed for anxiety     mirabegron (MYRBETRIQ) 50 MG 24 hr tablet Take 1 tablet (50 mg) by mouth daily     nitroglycerin (NITROSTAT) 0.4 MG sublingual tablet For chest pain place 1 tablet under the tongue every 5 minutes for 3 doses. If symptoms persist 5 minutes after 1st dose call 911.     potassium chloride ER (KLOR-CON M) 10 MEQ CR tablet Take 1 tablet (10 mEq) by mouth 2 times daily     prochlorperazine (COMPAZINE) 10 MG tablet Take 1 tablet (10 mg) by mouth every 6 hours as needed (Nausea/Vomiting)     triamcinolone (KENALOG) 0.1 % paste Apply to tongue lesion twice daily for one week.     vitamin B complex with vitamin C (VITAMIN  B COMPLEX) tablet Take 1 tablet by mouth daily     vitamin D3 (CHOLECALCIFEROL) 50 mcg (2000 units) tablet Take 1 tablet by mouth daily     zinc gluconate 50 MG tablet Take 50 mg by mouth daily     Zoledronic Acid (ZOMETA IV) Inject 4 mg into the vein every 3 months      No current facility-administered medications for this visit.         Review of Systems:  I have done 10 points of review systems and all negative except for those mentioned in HPI    Physical examination  Constitutional: Oriented, not in distress  Head and neck: normal posture and movements  Respiratory: Normal tidal breathing, no shortness of breath, no audible wheezing or stridor over the phone or video visit  Neurological: Alert, orientedx3  Psychiatric:  Mood and affect are appropriate with insight into his/her medical condition    Data:  Lab Results   Component Value Date    WBC 3.9 06/02/2021     Lab Results   Component Value Date    RBC 3.73 06/02/2021     Lab Results   Component Value Date    HGB 11.8 06/02/2021     Lab Results   Component Value Date    HCT 35.4 06/02/2021     Lab Results   Component Value Date    MCV 95 06/02/2021     Lab Results   Component Value Date     MCH 31.6 06/02/2021     Lab Results   Component Value Date    MCHC 33.3 06/02/2021     Lab Results   Component Value Date    RDW 13.2 06/02/2021     Lab Results   Component Value Date     06/02/2021       Lab Results   Component Value Date     06/02/2021      Lab Results   Component Value Date    POTASSIUM 3.7 06/02/2021     Lab Results   Component Value Date    CHLORIDE 108 06/02/2021     Lab Results   Component Value Date    HUMBERTO 8.8 06/02/2021     Lab Results   Component Value Date    CO2 30 06/02/2021     Lab Results   Component Value Date    BUN 23 06/02/2021     Lab Results   Component Value Date    CR 0.67 06/02/2021     Lab Results   Component Value Date    GLC 83 06/02/2021         CANELO Marc MD

## 2021-08-04 ENCOUNTER — TELEPHONE (OUTPATIENT)
Dept: PHARMACY | Facility: CLINIC | Age: 86
End: 2021-08-04

## 2021-08-04 DIAGNOSIS — C90.00 MULTIPLE MYELOMA NOT HAVING ACHIEVED REMISSION (H): Primary | ICD-10-CM

## 2021-08-04 RX ORDER — DEXAMETHASONE 4 MG/1
4 TABLET ORAL WEEKLY
Qty: 15 TABLET | Refills: 0 | Status: SHIPPED | OUTPATIENT
Start: 2021-08-04 | End: 2021-08-25

## 2021-08-04 RX ORDER — LENALIDOMIDE 10 MG/1
10 CAPSULE ORAL DAILY
Qty: 14 CAPSULE | Refills: 0 | Status: SHIPPED | OUTPATIENT
Start: 2021-08-04 | End: 2021-08-25

## 2021-08-04 NOTE — TELEPHONE ENCOUNTER
Oral Chemotherapy Monitoring Program   Medication: Revlimid  Rx: 10mg PO daily on days 1 through 14 of 21 day cycle   Auth #: [id number] obtained on 5/6/2021   Risk Category: adult female of non child bearing potential  Routine survey questions reviewed.   Rx to be Escribed to [facility]    Terrie Bowen [facility] Oncology Pharmacy Liaison  [phone]

## 2021-08-04 NOTE — TELEPHONE ENCOUNTER
Oral Chemotherapy Monitoring Program   Medication: Revlimid  Rx: 10mg PO daily on days 1 through 14 of 21 day cycle   Auth #: [id number]    Obtained on: 8/4/2021  Risk Category: adult female of non child bearing potential  Routine survey questions reviewed.   Rx to be Escribed to [facility]     Terrie Bowen [facility] Oncology Pharmacy Liaison  [phone]

## 2021-08-17 DIAGNOSIS — C90.00 MULTIPLE MYELOMA NOT HAVING ACHIEVED REMISSION (H): Primary | ICD-10-CM

## 2021-08-18 DIAGNOSIS — I10 BENIGN ESSENTIAL HYPERTENSION: ICD-10-CM

## 2021-08-18 DIAGNOSIS — C90.00 MULTIPLE MYELOMA NOT HAVING ACHIEVED REMISSION (H): Primary | ICD-10-CM

## 2021-08-18 RX ORDER — DEXAMETHASONE 4 MG/1
4 TABLET ORAL WEEKLY
Qty: 15 TABLET | Refills: 0 | Status: SHIPPED | OUTPATIENT
Start: 2021-08-18 | End: 2021-10-28

## 2021-08-18 RX ORDER — LENALIDOMIDE 10 MG/1
10 CAPSULE ORAL DAILY
Qty: 14 CAPSULE | Refills: 0 | Status: SHIPPED | OUTPATIENT
Start: 2021-08-18 | End: 2021-10-28

## 2021-08-20 RX ORDER — LISINOPRIL 5 MG/1
5 TABLET ORAL DAILY
Qty: 90 TABLET | Refills: 1 | Status: SHIPPED | OUTPATIENT
Start: 2021-08-20 | End: 2022-03-25

## 2021-08-20 RX ORDER — ZOLEDRONIC ACID 0.04 MG/ML
4 INJECTION, SOLUTION INTRAVENOUS ONCE
Status: CANCELLED | OUTPATIENT
Start: 2021-09-30 | End: 2021-08-20

## 2021-08-23 ENCOUNTER — LAB (OUTPATIENT)
Dept: INFUSION THERAPY | Facility: CLINIC | Age: 86
End: 2021-08-23
Attending: INTERNAL MEDICINE
Payer: MEDICARE

## 2021-08-23 ENCOUNTER — HOSPITAL ENCOUNTER (OUTPATIENT)
Facility: CLINIC | Age: 86
Discharge: HOME | End: 2021-08-23
Attending: INTERNAL MEDICINE | Admitting: INTERNAL MEDICINE
Payer: MEDICARE

## 2021-08-23 DIAGNOSIS — C90.00 MULTIPLE MYELOMA NOT HAVING ACHIEVED REMISSION (H): Primary | ICD-10-CM

## 2021-08-23 LAB
ALBUMIN SERPL-MCNC: 3.6 G/DL (ref 3.4–5)
ALP SERPL-CCNC: 80 U/L (ref 40–150)
ALT SERPL W P-5'-P-CCNC: 28 U/L (ref 0–50)
ANION GAP SERPL CALCULATED.3IONS-SCNC: 4 MMOL/L (ref 3–14)
AST SERPL W P-5'-P-CCNC: 17 U/L (ref 0–45)
BASOPHILS # BLD MANUAL: 0 10E3/UL (ref 0–0.2)
BASOPHILS NFR BLD MANUAL: 0 %
BILIRUB SERPL-MCNC: 0.6 MG/DL (ref 0.2–1.3)
BUN SERPL-MCNC: 20 MG/DL (ref 7–30)
CALCIUM SERPL-MCNC: 8.8 MG/DL (ref 8.5–10.1)
CHLORIDE BLD-SCNC: 109 MMOL/L (ref 94–109)
CO2 SERPL-SCNC: 26 MMOL/L (ref 20–32)
CREAT SERPL-MCNC: 0.67 MG/DL (ref 0.52–1.04)
ELLIPTOCYTES BLD QL SMEAR: SLIGHT
EOSINOPHIL # BLD MANUAL: 0 10E3/UL (ref 0–0.7)
EOSINOPHIL NFR BLD MANUAL: 0 %
ERYTHROCYTE [DISTWIDTH] IN BLOOD BY AUTOMATED COUNT: 13.9 % (ref 10–15)
GFR SERPL CREATININE-BSD FRML MDRD: 80 ML/MIN/1.73M2
GLUCOSE BLD-MCNC: 157 MG/DL (ref 70–99)
HCT VFR BLD AUTO: 34.2 % (ref 35–47)
HGB BLD-MCNC: 11.5 G/DL (ref 11.7–15.7)
LYMPHOCYTES # BLD MANUAL: 0.8 10E3/UL (ref 0.8–5.3)
LYMPHOCYTES NFR BLD MANUAL: 17 %
MCH RBC QN AUTO: 31.7 PG (ref 26.5–33)
MCHC RBC AUTO-ENTMCNC: 33.6 G/DL (ref 31.5–36.5)
MCV RBC AUTO: 94 FL (ref 78–100)
MONOCYTES # BLD MANUAL: 0.1 10E3/UL (ref 0–1.3)
MONOCYTES NFR BLD MANUAL: 2 %
NEUTROPHILS # BLD MANUAL: 3.7 10E3/UL (ref 1.6–8.3)
NEUTROPHILS NFR BLD MANUAL: 81 %
PLAT MORPH BLD: ABNORMAL
PLATELET # BLD AUTO: 147 10E3/UL (ref 150–450)
POTASSIUM BLD-SCNC: 4 MMOL/L (ref 3.4–5.3)
PROT SERPL-MCNC: 7.5 G/DL (ref 6.8–8.8)
RBC # BLD AUTO: 3.63 10E6/UL (ref 3.8–5.2)
RBC MORPH BLD: ABNORMAL
SODIUM SERPL-SCNC: 139 MMOL/L (ref 133–144)
TOTAL PROTEIN SERUM FOR ELP: 7.1 G/DL (ref 6.8–8.8)
WBC # BLD AUTO: 4.6 10E3/UL (ref 4–11)

## 2021-08-23 PROCEDURE — 82784 ASSAY IGA/IGD/IGG/IGM EACH: CPT | Performed by: NURSE PRACTITIONER

## 2021-08-23 PROCEDURE — 36415 COLL VENOUS BLD VENIPUNCTURE: CPT

## 2021-08-23 PROCEDURE — 84155 ASSAY OF PROTEIN SERUM: CPT | Performed by: NURSE PRACTITIONER

## 2021-08-23 PROCEDURE — 80053 COMPREHEN METABOLIC PANEL: CPT | Performed by: NURSE PRACTITIONER

## 2021-08-23 PROCEDURE — 84165 PROTEIN E-PHORESIS SERUM: CPT | Mod: TC | Performed by: STUDENT IN AN ORGANIZED HEALTH CARE EDUCATION/TRAINING PROGRAM

## 2021-08-23 PROCEDURE — 83883 ASSAY NEPHELOMETRY NOT SPEC: CPT | Performed by: NURSE PRACTITIONER

## 2021-08-23 PROCEDURE — 85027 COMPLETE CBC AUTOMATED: CPT | Performed by: NURSE PRACTITIONER

## 2021-08-23 NOTE — PROGRESS NOTES
Medical Assistant Note:  Quiana Dunaway presents today for blood draw.    Patient seen by provider today: No.   present during visit today: Not Applicable.    Concerns: No Concerns.    Procedure:  Lab draw site: lac, Needle type: bf, Gauge: 23.    Post Assessment:  Labs drawn without difficulty: Yes.    Discharge Plan:  Departure Mode: Ambulatory.    Face to Face Time: 5 min.    Mariela Lyn, CMA

## 2021-08-24 LAB
IGG SERPL-MCNC: 1110 MG/DL (ref 610–1616)
KAPPA LC FREE SER-MCNC: 28.7 MG/DL (ref 0.33–1.94)
KAPPA LC FREE/LAMBDA FREE SER NEPH: 45.56 {RATIO} (ref 0.26–1.65)
LAMBDA LC FREE SERPL-MCNC: 0.63 MG/DL (ref 0.57–2.63)

## 2021-08-24 NOTE — PROGRESS NOTES
Oral Chemotherapy Monitoring Program.    Patient currently on Revlimid therapy. Quiana reports that she is currently on her off week. She started her off week on 8/12/19. She recently changed her Revlimid cycle from a 21 on 7 off to a 14 on 7 off cycle. I asked if there fatigue is any better with the new schedule but she reports that its probably about the same.     She is due to start her next Revlimid cycle on 8/19/18    Reviewed lab results from 8/13/18. There were no concerning abnormalities.    After the next cycle the patient will take a break in the month of September. Her grandchildren are coming to visit during that month and she wants to take a break off Revlimid for that month. Dr Parry is okay with her taking a break. She will resume Revlimid after she sees Dr Parry in October    Laci Escobar PharmD   Medication refill information reviewed.     Due date for  HYDROcodone-acetaminophen (NORCO) 5-325 MG tablet  is 08/27/21     Prescriptions prepped for review.     Will route to provider.

## 2021-08-25 ENCOUNTER — ONCOLOGY VISIT (OUTPATIENT)
Dept: ONCOLOGY | Facility: CLINIC | Age: 86
End: 2021-08-25
Attending: INTERNAL MEDICINE
Payer: MEDICARE

## 2021-08-25 ENCOUNTER — DOCUMENTATION ONLY (OUTPATIENT)
Dept: PHARMACY | Facility: CLINIC | Age: 86
End: 2021-08-25

## 2021-08-25 VITALS
RESPIRATION RATE: 18 BRPM | HEART RATE: 75 BPM | DIASTOLIC BLOOD PRESSURE: 65 MMHG | OXYGEN SATURATION: 94 % | WEIGHT: 142.4 LBS | SYSTOLIC BLOOD PRESSURE: 110 MMHG | BODY MASS INDEX: 26.05 KG/M2 | TEMPERATURE: 97.9 F

## 2021-08-25 DIAGNOSIS — C90.00 MULTIPLE MYELOMA NOT HAVING ACHIEVED REMISSION (H): Primary | ICD-10-CM

## 2021-08-25 LAB
ALBUMIN SERPL ELPH-MCNC: 4.1 G/DL (ref 3.7–5.1)
ALPHA1 GLOB SERPL ELPH-MCNC: 0.4 G/DL (ref 0.2–0.4)
ALPHA2 GLOB SERPL ELPH-MCNC: 0.8 G/DL (ref 0.5–0.9)
B-GLOBULIN SERPL ELPH-MCNC: 0.7 G/DL (ref 0.6–1)
GAMMA GLOB SERPL ELPH-MCNC: 1.1 G/DL (ref 0.7–1.6)
M PROTEIN SERPL ELPH-MCNC: 0.9 G/DL
PROT PATTERN SERPL ELPH-IMP: ABNORMAL

## 2021-08-25 PROCEDURE — 99214 OFFICE O/P EST MOD 30 MIN: CPT | Performed by: INTERNAL MEDICINE

## 2021-08-25 PROCEDURE — 84165 PROTEIN E-PHORESIS SERUM: CPT | Mod: 26 | Performed by: STUDENT IN AN ORGANIZED HEALTH CARE EDUCATION/TRAINING PROGRAM

## 2021-08-25 PROCEDURE — G0463 HOSPITAL OUTPT CLINIC VISIT: HCPCS

## 2021-08-25 ASSESSMENT — PAIN SCALES - GENERAL: PAINLEVEL: NO PAIN (0)

## 2021-08-25 NOTE — LETTER
"    8/25/2021         RE: Quiana Dunaway  [address] [facility] [address]        Dear Colleague,    Thank you for referring your patient, Quiana Dunaway, to the [facility]. Please see a copy of my visit note below.    Oncology Rooming Note    August 25, 2021 10:40 AM   Quiana Dunaway is a 86 year old female who presents for:    Chief Complaint   Patient presents with     Oncology Clinic Visit     Initial Vitals: /65   Pulse 75   Temp 97.9  F (36.6  C) (Oral)   Resp 18   Wt 64.6 kg (142 lb 6.4 oz)   SpO2 94%   BMI 26.05 kg/m   Estimated body mass index is 26.05 kg/m  as calculated from the following:    Height as of 7/5/21: 1.575 m (5' 2\").    Weight as of this encounter: 64.6 kg (142 lb 6.4 oz). Body surface area is 1.68 meters squared.  No Pain (0) Comment: Data Unavailable   No LMP recorded. Patient is postmenopausal.  Allergies reviewed: Yes  Medications reviewed: Yes    Medications: Medication refills not needed today.  Pharmacy name entered into [facility]:    [facility] - [place], [place] - [address]  [id number] - [place], [place] - [address] AT [facility]    Clinical concerns: feeling weaker BK was notified.      Shari J. Schoenberger, [facility]              HEMATOLOGY HISTORY: Mrs. Dunaway is a lady with multiple myeloma (IgG kappa). High risk, t(14;16).  1. On 05/01/2017, WBC of 3.4, hemoglobin of 10.2 and platelet of 154.  2. SPEP on 07/07/2017 revealed M-spike of 1.8.  3. Bone marrow biopsy on 07/12/2017 reveals kappa monotypic plasma cell population consistent with multiple myeloma.  Bone marrow is 70% cellular.  Plasma cell is 50-60%.     -There is gain of chromosome 1, 5, 9, 15, and 17.  There is translocation 14;16.   4.  On 07/18/2017:  -M-spike of 1.9.   -Immunofixation reveals monoclonal IgG kappa.    -IgG level of 2970.  IgA of 15 and IgM of 175.    -Kappa free light chain of " 67.7.  Lambda free light chain of 1.42.  Ratio of kappa to lambda of 47.71.    -Beta 2 microglobulin of 3.4.   5. PET scan on 07/24/2017 reveals several focal areas of increased FDG uptake consistent with multiple myeloma.  There is probable epithelial cyst in tail of the pancreas.  No associated abnormal FDG activity.  Left thyroid cysts or nodules without any FDG activity.  There is a 0.3 cm left upper lobe lung nodule.   8.  Velcade, Revlimid and dexamethasone between on 08/14/2017 and 04/30/2018. Velcade stopped.   -Revlimid and dexamethasone continued.    SUBJECTIVE:  Ms. Dunaway is an 86-year-old female with multiple myeloma.  She is on Revlimid and dexamethasone.  Revlimid has been on hold for a few weeks because of side effects including fatigue and cough.     The patient saw the pulmonologist regarding her cough.  The patient said that her cough is improved.     The patient still has fatigue. It is not getting worse.  No chest pain.  No shortness of breath at rest.  No nausea or vomiting.  Appetite is fair.  She is not losing weight.  As per the daughter, the patient does not get hungry.  No urinary or bowel complaints.  No bleeding.     All other review of systems negative.     PHYSICAL EXAMINATION:    GENERAL:  She is alert and oriented x 3.  Not in any distress.  VITAL SIGNS:  Reviewed.    The rest of the systems not examined.     LABORATORY DATA:  CBC, CMP, SPEP, free light chain and IgG level reviewed.     ASSESSMENT:    1.  An 86-year-old female with multiple myeloma on Revlimid and dexamethasone.  2.  Fatigue, stable.  3.  Cough, improved.  4.  Anemia.  5.  Thrombocytopenia.     PLAN:    1.  Discussed regarding myeloma.  I explained to her that monoclonal peak, IgG level and kappa free light chain have mildly increased.  This is due to Revlimid being on hold.  I told her it should improve as she is going to restart Revlimid.  I discussed regarding resuming Revlimid.  She is agreeable for it.  She  will take 10 mg a day for 2 weeks on and 1 week off.  She does not want continuous Revlimid.  The patient is on dexamethasone 4 mg a week.  That will be continued.  She is tolerating them well.  I explained to her we will continue to monitor her SPEP, kappa free light chain and IgG level.   2.  She has fatigue.  This is due to multiple reasons including her age, myeloma, Revlimid, and other medical problems.  The patient denies being depressed.  Her appetite is not good.  She is not losing weight.  She is on dexamethasone weekly.  That does not help with improving her appetite.  I told the patient that as long as her weight is maintained, I am not too worried, but if she starts losing weight, we will discuss regarding the other option for improving her appetite.  3.  She will continue on aspirin and acyclovir.  4.  The patient is on Zometa every 3 months.  She does not want it this month.  We will give it to her next month.  5.  I will see her in 2 months' time.  In between, she will see our nurse practitioner.  I advised her to call us with any questions or concerns.      This office note has been dictated.          Again, thank you for allowing me to participate in the care of your patient.        Sincerely,        Alex Parry MD

## 2021-08-25 NOTE — PROGRESS NOTES
Oral Chemotherapy Monitoring Program  Lab Follow Up    Reviewed lab results from 08/23/21.    ORAL CHEMOTHERAPY 2/1/2021 3/9/2021 4/12/2021 4/16/2021 5/5/2021 7/7/2021 8/25/2021   Assessment Type Lab Monitoring Lab Monitoring Lab Monitoring Refill Lab Monitoring Quarterly Follow up Lab Monitoring   Diagnosis Code Multiple Myeloma Multiple Myeloma Multiple Myeloma Multiple Myeloma Multiple Myeloma Multiple Myeloma Multiple Myeloma   Providers Sohan Parry   Clinic Name/Location [facility]   Drug Name Revlimid (Lenalidomide) Revlimid (Lenalidomide) Revlimid (lenalidomide) Revlimid (lenalidomide) Revlimid (lenalidomide) Revlimid (lenalidomide) Revlimid (lenalidomide)   Dose 10 mg 10 mg 10 mg 10 mg 10 mg 10 mg 10 mg   Current Schedule Daily Daily Daily Daily Daily Daily Daily   Cycle Details 2 weeks on, 1 week off 2 weeks on, 1 week off 2 weeks on, 1 week off 2 weeks on, 1 week off 2 weeks on, 1 week off 2 weeks on, 1 week off 2 weeks on, 1 week off   Start Date of Last Cycle 1/28/2021 2/18/2021 4/1/2021 4/1/2021 4/22/2021 6/4/2021 -   Planned next cycle start date 2/18/2021 3/11/2021 4/22/2021 4/22/2021 5/13/2021 7/1/2021 -   Doses missed in last 2 weeks - - - - - - -   Adherence Assessment - - - - - Adherent -   Adverse Effects - - No AE identified during assessment - No AE identified during assessment No AE identified during assessment Thrombocytopenia   Fatigue - - - - - - -   Pharmacist Intervention(fatigue) - - - - - - -   Thrombocytopenia - - - - - - Grade 1   Pharmacist Intervention(thrombocytopenia) - - - - - - No   Home BPs - - - - - not needed -   Any new drug interactions? - - - - - No -   Is the dose as ordered appropriate for the patient? - - - - - Yes -   Is the patient currently in pain? - - - - - - -   Has the patient been assessed within the past 6 months for depression? - - - - - - -   Has the patient  missed any days of school, work, or other routine activity? - - - - - - -   Since the last time we talked, have you been hospitalized or used the emergency room? - - - - - - -       Labs:  _  Result Component Current Result Ref [place]   Sodium 139 (8/23/2021) 133 - 144 mmol/L     _  Result Component Current Result Ref [place]   Potassium 4.0 (8/23/2021) 3.4 - 5.3 mmol/L     _  Result Component Current Result Ref [place]   Calcium 8.8 (8/23/2021) 8.5 - 10.1 mg/dL     No results found for Mag within last 30 days.     No results found for Phos within last 30 days.     _  Result Component Current Result Ref [place]   Albumin 3.6 (8/23/2021) 3.4 - 5.0 g/dL     _  Result Component Current Result Ref [place]   Urea Nitrogen 20 (8/23/2021) 7 - 30 mg/dL     _  Result Component Current Result Ref [place]   Creatinine 0.67 (8/23/2021) 0.52 - 1.04 mg/dL     _  Result Component Current Result Ref [place]   AST 17 (8/23/2021) 0 - 45 U/L     _  Result Component Current Result Ref [place]   ALT 28 (8/23/2021) 0 - 50 U/L     _  Result Component Current Result Ref [place]   Bilirubin Total 0.6 (8/23/2021) 0.2 - 1.3 mg/dL     _  Result Component Current Result Ref [place]   WBC Count 4.6 (8/23/2021) 4.0 - 11.0 10e3/uL     _  Result Component Current Result Ref [place]   Hemoglobin 11.5 (L) (8/23/2021) 11.7 - 15.7 g/dL     _  Result Component Current Result Ref [place]   Platelet Count 147 (L) (8/23/2021) 150 - 450 10e3/uL     _  Result Component Current Result Ref [place]   Absolute Neutrophils 3.7 (8/23/2021) 1.6 - 8.3 10e3/uL       Assessment & Plan:  No concerning abnormalities. Ok to proceed with lenalidomide per Dr. Parry.    Questions answered to patient's satisfaction.    Follow-Up:  4 weeks with labs.    Rachel Moniz, Pharmacist Intern  August 25, 2021    Debra Ortega PharmD  August 25, 2021

## 2021-08-25 NOTE — PROGRESS NOTES
"Oncology Rooming Note    August 25, 2021 10:40 AM   Quiana Dunaway is a 86 year old female who presents for:    Chief Complaint   Patient presents with     Oncology Clinic Visit     Initial Vitals: /65   Pulse 75   Temp 97.9  F (36.6  C) (Oral)   Resp 18   Wt 64.6 kg (142 lb 6.4 oz)   SpO2 94%   BMI 26.05 kg/m   Estimated body mass index is 26.05 kg/m  as calculated from the following:    Height as of 7/5/21: 1.575 m (5' 2\").    Weight as of this encounter: 64.6 kg (142 lb 6.4 oz). Body surface area is 1.68 meters squared.  No Pain (0) Comment: Data Unavailable   No LMP recorded. Patient is postmenopausal.  Allergies reviewed: Yes  Medications reviewed: Yes    Medications: Medication refills not needed today.  Pharmacy name entered into [employer]:    [facility] - [place], [place] - [address]  [id number] - [place], [place] - [address] AT [place]    Clinical concerns: feeling weaker BK was notified.      Shari J. Schoenberger, DYANA            "

## 2021-08-25 NOTE — PATIENT INSTRUCTIONS
1. Continue revlimid and dexamethasone.  2. Continue aspirin and acyclovir.  3. See Juaquin Hammond in a month with labs.  -Zometa on same day.  4. See me in 2 months with labs.

## 2021-08-29 NOTE — PROGRESS NOTES
HEMATOLOGY HISTORY: Mrs. Dunaway is a lady with multiple myeloma (IgG kappa). High risk, t(14;16).  1. On 05/01/2017, WBC of 3.4, hemoglobin of 10.2 and platelet of 154.  2. SPEP on 07/07/2017 revealed M-spike of 1.8.  3. Bone marrow biopsy on 07/12/2017 reveals kappa monotypic plasma cell population consistent with multiple myeloma.  Bone marrow is 70% cellular.  Plasma cell is 50-60%.     -There is gain of chromosome 1, 5, 9, 15, and 17.  There is translocation 14;16.   4.  On 07/18/2017:  -M-spike of 1.9.   -Immunofixation reveals monoclonal IgG kappa.    -IgG level of 2970.  IgA of 15 and IgM of 175.    -Kappa free light chain of 67.7.  Lambda free light chain of 1.42.  Ratio of kappa to lambda of 47.71.    -Beta 2 microglobulin of 3.4.   5. PET scan on 07/24/2017 reveals several focal areas of increased FDG uptake consistent with multiple myeloma.  There is probable epithelial cyst in tail of the pancreas.  No associated abnormal FDG activity.  Left thyroid cysts or nodules without any FDG activity.  There is a 0.3 cm left upper lobe lung nodule.   8.  Velcade, Revlimid and dexamethasone between on 08/14/2017 and 04/30/2018. Velcade stopped.   -Revlimid and dexamethasone continued.    SUBJECTIVE:  Ms. Dunaway is an 86-year-old female with multiple myeloma.  She is on Revlimid and dexamethasone.  Revlimid has been on hold for a few weeks because of side effects including fatigue and cough.     The patient saw the pulmonologist regarding her cough.  The patient said that her cough is improved.     The patient still has fatigue. It is not getting worse.  No chest pain.  No shortness of breath at rest.  No nausea or vomiting.  Appetite is fair.  She is not losing weight.  As per the daughter, the patient does not get hungry.  No urinary or bowel complaints.  No bleeding.     All other review of systems negative.     PHYSICAL EXAMINATION:    GENERAL:  She is alert and oriented x 3.  Not in any distress.  VITAL  SIGNS:  Reviewed.    The rest of the systems not examined.     LABORATORY DATA:  CBC, CMP, SPEP, free light chain and IgG level reviewed.     ASSESSMENT:    1.  An 86-year-old female with multiple myeloma on Revlimid and dexamethasone.  2.  Fatigue, stable.  3.  Cough, improved.  4.  Anemia.  5.  Thrombocytopenia.     PLAN:    1.  Discussed regarding myeloma.  I explained to her that monoclonal peak, IgG level and kappa free light chain have mildly increased.  This is due to Revlimid being on hold.  I told her it should improve as she is going to restart Revlimid.  I discussed regarding resuming Revlimid.  She is agreeable for it.  She will take 10 mg a day for 2 weeks on and 1 week off.  She does not want continuous Revlimid.  The patient is on dexamethasone 4 mg a week.  That will be continued.  She is tolerating them well.  I explained to her we will continue to monitor her SPEP, kappa free light chain and IgG level.   2.  She has fatigue.  This is due to multiple reasons including her age, myeloma, Revlimid, and other medical problems.  The patient denies being depressed.  Her appetite is not good.  She is not losing weight.  She is on dexamethasone weekly.  That does not help with improving her appetite.  I told the patient that as long as her weight is maintained, I am not too worried, but if she starts losing weight, we will discuss regarding the other option for improving her appetite.  3.  She will continue on aspirin and acyclovir.  4.  The patient is on Zometa every 3 months.  She does not want it this month.  We will give it to her next month.  5.  I will see her in 2 months' time.  In between, she will see our nurse practitioner.  I advised her to call us with any questions or concerns.

## 2021-08-30 ENCOUNTER — OFFICE VISIT (OUTPATIENT)
Dept: FAMILY MEDICINE | Facility: CLINIC | Age: 86
End: 2021-08-30
Payer: MEDICARE

## 2021-08-30 VITALS
SYSTOLIC BLOOD PRESSURE: 115 MMHG | DIASTOLIC BLOOD PRESSURE: 71 MMHG | HEART RATE: 70 BPM | TEMPERATURE: 98.6 F | BODY MASS INDEX: 26.13 KG/M2 | RESPIRATION RATE: 20 BRPM | OXYGEN SATURATION: 95 % | WEIGHT: 142 LBS | HEIGHT: 62 IN

## 2021-08-30 DIAGNOSIS — C90.00 MULTIPLE MYELOMA NOT HAVING ACHIEVED REMISSION (H): ICD-10-CM

## 2021-08-30 DIAGNOSIS — E53.8 VITAMIN B12 DEFICIENCY (NON ANEMIC): Primary | ICD-10-CM

## 2021-08-30 DIAGNOSIS — I10 BENIGN ESSENTIAL HYPERTENSION: ICD-10-CM

## 2021-08-30 PROCEDURE — 99213 OFFICE O/P EST LOW 20 MIN: CPT | Performed by: INTERNAL MEDICINE

## 2021-08-30 ASSESSMENT — MIFFLIN-ST. JEOR: SCORE: 1042.36

## 2021-08-30 NOTE — PROGRESS NOTES
"    Assessment & Plan     Vitamin B12 deficiency (non anemic)  Check B12 level with upcoming routine lab draw  - Vitamin B12; Future    Multiple myeloma not having achieved remission (H)  Continue follow-up with hematology/oncology  Answered questions about Imodium    Benign essential hypertension  Well-controlled        20 minutes spent on the date of the encounter doing chart review, history and exam, documentation and further activities per the note    Return in about 6 weeks (around 10/11/2021) for recheck.   Patient instructed to return to clinic or contact us sooner if symptoms worsen or new symptoms develop.     César Chung MD  [facility] MAGALY Araya is a 86 year old who presents for the following health issues  accompanied by her daughter-in-law:    HPI   86-year-old female with a history of multiple myeloma receiving chemotherapy, also with history of diastolic congestive heart failure, labile blood pressure, chronic kidney disease, anxiety, status post pacemake    A physician friend of her suggested that she might feel better if she took vitamin B12 injections.    She does have fatigue, she takes a B complex vitamin, she had a vitamin B12 level of three seventy-seven in two thousand and seventeen    She had questions about getting a Covid vaccine booster    She gets diarrhea from Revlimid, she had questions about dosing Imodium to help with symptoms    Review of Systems         Objective    /71 (BP Location: Left arm, Cuff Size: Adult Regular)   Pulse 70   Temp 98.6  F (37  C) (Tympanic)   Ht 1.575 m (5' 2\")   Wt 64.4 kg (142 lb)   SpO2 95%   BMI 25.97 kg/m    Body mass index is 25.97 kg/m .  Physical Exam   GENERAL: healthy, alert and no distress  NECK: no adenopathy, no asymmetry, masses, or scars and thyroid normal to palpation  RESP: lungs clear to auscultation - no rales, rhonchi or wheezes  CV: Heart with regular rate and rhythm.   ABDOMEN: soft, " nontender, no hepatosplenomegaly, no masses and bowel sounds normal  MS: no gross musculoskeletal defects noted, no edema  NEURO: Normal strength and tone, mentation intact and speech normal  PSYCH: mentation appears normal, affect normal/bright

## 2021-09-09 DIAGNOSIS — C90.00 MULTIPLE MYELOMA NOT HAVING ACHIEVED REMISSION (H): Primary | ICD-10-CM

## 2021-09-09 RX ORDER — DEXAMETHASONE 4 MG/1
4 TABLET ORAL WEEKLY
Qty: 15 TABLET | Refills: 0 | Status: SHIPPED | OUTPATIENT
Start: 2021-09-09 | End: 2021-10-28

## 2021-09-09 RX ORDER — LENALIDOMIDE 10 MG/1
10 CAPSULE ORAL DAILY
Qty: 14 CAPSULE | Refills: 0 | Status: SHIPPED | OUTPATIENT
Start: 2021-09-09 | End: 2021-10-28

## 2021-09-10 ENCOUNTER — TELEPHONE (OUTPATIENT)
Dept: PHARMACY | Facility: CLINIC | Age: 86
End: 2021-09-10

## 2021-09-10 NOTE — TELEPHONE ENCOUNTER
Oral Chemotherapy Monitoring Program   Medication: Revlimid  Rx: 10mg PO daily on days 1 through 14 of 21 day cycle   Auth #: [id number] obtained on 9/10/2021  Risk Category: adult female of non child bearing potential  Routine survey questions reviewed.   Rx to be Escribed to [facility]     Terrie Bowen [facility] Oncology Pharmacy Liaison  [phone]

## 2021-09-11 ENCOUNTER — HEALTH MAINTENANCE LETTER (OUTPATIENT)
Age: 86
End: 2021-09-11

## 2021-09-16 DIAGNOSIS — C90.00 MULTIPLE MYELOMA NOT HAVING ACHIEVED REMISSION (H): ICD-10-CM

## 2021-09-16 DIAGNOSIS — C90.00 MULTIPLE MYELOMA NOT HAVING ACHIEVED REMISSION (H): Primary | ICD-10-CM

## 2021-09-16 RX ORDER — ACYCLOVIR 400 MG/1
400 TABLET ORAL 2 TIMES DAILY
Qty: 60 TABLET | Refills: 5 | Status: CANCELLED | OUTPATIENT
Start: 2021-09-16

## 2021-09-20 RX ORDER — LORAZEPAM 0.5 MG/1
0.5 TABLET ORAL EVERY 6 HOURS PRN
Qty: 30 TABLET | Refills: 3 | Status: SHIPPED | OUTPATIENT
Start: 2021-09-20 | End: 2022-01-04

## 2021-09-22 DIAGNOSIS — C90.00 MULTIPLE MYELOMA NOT HAVING ACHIEVED REMISSION (H): ICD-10-CM

## 2021-09-22 RX ORDER — ACYCLOVIR 400 MG/1
400 TABLET ORAL 2 TIMES DAILY
Qty: 60 TABLET | Refills: 5 | Status: SHIPPED | OUTPATIENT
Start: 2021-09-22 | End: 2022-03-22

## 2021-09-28 DIAGNOSIS — C90.00 MULTIPLE MYELOMA NOT HAVING ACHIEVED REMISSION (H): Primary | ICD-10-CM

## 2021-09-28 RX ORDER — DEXAMETHASONE 4 MG/1
4 TABLET ORAL WEEKLY
Qty: 15 TABLET | Refills: 0 | Status: SHIPPED | OUTPATIENT
Start: 2021-09-28 | End: 2021-10-28

## 2021-09-28 RX ORDER — LENALIDOMIDE 10 MG/1
10 CAPSULE ORAL DAILY
Qty: 14 CAPSULE | Refills: 0 | Status: SHIPPED | OUTPATIENT
Start: 2021-09-28 | End: 2021-10-28

## 2021-09-30 ENCOUNTER — ONCOLOGY VISIT (OUTPATIENT)
Dept: ONCOLOGY | Facility: CLINIC | Age: 86
End: 2021-09-30
Attending: INTERNAL MEDICINE
Payer: MEDICARE

## 2021-09-30 ENCOUNTER — LAB (OUTPATIENT)
Dept: INFUSION THERAPY | Facility: CLINIC | Age: 86
End: 2021-09-30
Attending: INTERNAL MEDICINE
Payer: MEDICARE

## 2021-09-30 VITALS
OXYGEN SATURATION: 95 % | DIASTOLIC BLOOD PRESSURE: 63 MMHG | HEART RATE: 69 BPM | RESPIRATION RATE: 18 BRPM | WEIGHT: 143 LBS | TEMPERATURE: 98.8 F | BODY MASS INDEX: 26.16 KG/M2 | SYSTOLIC BLOOD PRESSURE: 102 MMHG

## 2021-09-30 DIAGNOSIS — C90.00 MULTIPLE MYELOMA NOT HAVING ACHIEVED REMISSION (H): Primary | ICD-10-CM

## 2021-09-30 DIAGNOSIS — M79.89 LEFT LEG SWELLING: ICD-10-CM

## 2021-09-30 DIAGNOSIS — C90.00 MULTIPLE MYELOMA NOT HAVING ACHIEVED REMISSION (H): ICD-10-CM

## 2021-09-30 DIAGNOSIS — E53.8 VITAMIN B12 DEFICIENCY (NON ANEMIC): Primary | ICD-10-CM

## 2021-09-30 LAB
ALBUMIN SERPL-MCNC: 3.2 G/DL (ref 3.4–5)
ALP SERPL-CCNC: 64 U/L (ref 40–150)
ALT SERPL W P-5'-P-CCNC: 19 U/L (ref 0–50)
ANION GAP SERPL CALCULATED.3IONS-SCNC: 6 MMOL/L (ref 3–14)
AST SERPL W P-5'-P-CCNC: 9 U/L (ref 0–45)
BASOPHILS # BLD AUTO: 0 10E3/UL (ref 0–0.2)
BASOPHILS NFR BLD AUTO: 1 %
BILIRUB SERPL-MCNC: 0.7 MG/DL (ref 0.2–1.3)
BUN SERPL-MCNC: 17 MG/DL (ref 7–30)
CALCIUM SERPL-MCNC: 8.4 MG/DL (ref 8.5–10.1)
CHLORIDE BLD-SCNC: 105 MMOL/L (ref 94–109)
CO2 SERPL-SCNC: 29 MMOL/L (ref 20–32)
CREAT SERPL-MCNC: 0.81 MG/DL (ref 0.52–1.04)
EOSINOPHIL # BLD AUTO: 0.6 10E3/UL (ref 0–0.7)
EOSINOPHIL NFR BLD AUTO: 13 %
ERYTHROCYTE [DISTWIDTH] IN BLOOD BY AUTOMATED COUNT: 12.9 % (ref 10–15)
GFR SERPL CREATININE-BSD FRML MDRD: 66 ML/MIN/1.73M2
GLUCOSE BLD-MCNC: 92 MG/DL (ref 70–99)
HCT VFR BLD AUTO: 32.7 % (ref 35–47)
HGB BLD-MCNC: 10.8 G/DL (ref 11.7–15.7)
IMM GRANULOCYTES # BLD: 0 10E3/UL
IMM GRANULOCYTES NFR BLD: 1 %
LYMPHOCYTES # BLD AUTO: 1.4 10E3/UL (ref 0.8–5.3)
LYMPHOCYTES NFR BLD AUTO: 32 %
MCH RBC QN AUTO: 31.7 PG (ref 26.5–33)
MCHC RBC AUTO-ENTMCNC: 33 G/DL (ref 31.5–36.5)
MCV RBC AUTO: 96 FL (ref 78–100)
MONOCYTES # BLD AUTO: 0.5 10E3/UL (ref 0–1.3)
MONOCYTES NFR BLD AUTO: 11 %
NEUTROPHILS # BLD AUTO: 1.9 10E3/UL (ref 1.6–8.3)
NEUTROPHILS NFR BLD AUTO: 42 %
NRBC # BLD AUTO: 0 10E3/UL
NRBC BLD AUTO-RTO: 0 /100
PLATELET # BLD AUTO: 181 10E3/UL (ref 150–450)
POTASSIUM BLD-SCNC: 3.3 MMOL/L (ref 3.4–5.3)
PROT SERPL-MCNC: 6.6 G/DL (ref 6.8–8.8)
RBC # BLD AUTO: 3.41 10E6/UL (ref 3.8–5.2)
SODIUM SERPL-SCNC: 140 MMOL/L (ref 133–144)
TOTAL PROTEIN SERUM FOR ELP: 6.4 G/DL (ref 6.8–8.8)
VIT B12 SERPL-MCNC: 276 PG/ML (ref 193–986)
WBC # BLD AUTO: 4.4 10E3/UL (ref 4–11)

## 2021-09-30 PROCEDURE — 250N000013 HC RX MED GY IP 250 OP 250 PS 637: Performed by: NURSE PRACTITIONER

## 2021-09-30 PROCEDURE — 250N000011 HC RX IP 250 OP 636: Performed by: NURSE PRACTITIONER

## 2021-09-30 PROCEDURE — 83883 ASSAY NEPHELOMETRY NOT SPEC: CPT | Performed by: INTERNAL MEDICINE

## 2021-09-30 PROCEDURE — 82784 ASSAY IGA/IGD/IGG/IGM EACH: CPT | Performed by: INTERNAL MEDICINE

## 2021-09-30 PROCEDURE — 36415 COLL VENOUS BLD VENIPUNCTURE: CPT

## 2021-09-30 PROCEDURE — 84155 ASSAY OF PROTEIN SERUM: CPT | Mod: 91 | Performed by: INTERNAL MEDICINE

## 2021-09-30 PROCEDURE — G0008 ADMIN INFLUENZA VIRUS VAC: HCPCS | Performed by: NURSE PRACTITIONER

## 2021-09-30 PROCEDURE — 90662 IIV NO PRSV INCREASED AG IM: CPT | Performed by: NURSE PRACTITIONER

## 2021-09-30 PROCEDURE — 84165 PROTEIN E-PHORESIS SERUM: CPT | Mod: TC | Performed by: STUDENT IN AN ORGANIZED HEALTH CARE EDUCATION/TRAINING PROGRAM

## 2021-09-30 PROCEDURE — 85025 COMPLETE CBC W/AUTO DIFF WBC: CPT | Performed by: INTERNAL MEDICINE

## 2021-09-30 PROCEDURE — 258N000003 HC RX IP 258 OP 636: Performed by: NURSE PRACTITIONER

## 2021-09-30 PROCEDURE — 82040 ASSAY OF SERUM ALBUMIN: CPT | Performed by: INTERNAL MEDICINE

## 2021-09-30 PROCEDURE — 82607 VITAMIN B-12: CPT | Performed by: INTERNAL MEDICINE

## 2021-09-30 PROCEDURE — 99214 OFFICE O/P EST MOD 30 MIN: CPT | Performed by: NURSE PRACTITIONER

## 2021-09-30 RX ORDER — POTASSIUM CHLORIDE 750 MG/1
40 TABLET, EXTENDED RELEASE ORAL ONCE
Status: COMPLETED | OUTPATIENT
Start: 2021-09-30 | End: 2021-09-30

## 2021-09-30 RX ORDER — LANOLIN ALCOHOL/MO/W.PET/CERES
1000 CREAM (GRAM) TOPICAL DAILY
Qty: 90 TABLET | Refills: 3 | Status: SHIPPED | OUTPATIENT
Start: 2021-09-30 | End: 2022-11-11

## 2021-09-30 RX ADMIN — POTASSIUM CHLORIDE 40 MEQ: 750 TABLET, FILM COATED, EXTENDED RELEASE ORAL at 14:20

## 2021-09-30 RX ADMIN — ZOLEDRONIC ACID 3.5 MG: 4 INJECTION, SOLUTION, CONCENTRATE INTRAVENOUS at 14:21

## 2021-09-30 RX ADMIN — INFLUENZA A VIRUS A/VICTORIA/2570/2019 IVR-215 (H1N1) ANTIGEN (FORMALDEHYDE INACTIVATED), INFLUENZA A VIRUS A/TASMANIA/503/2020 IVR-221 (H3N2) ANTIGEN (FORMALDEHYDE INACTIVATED), INFLUENZA B VIRUS B/PHUKET/3073/2013 ANTIGEN (FORMALDEHYDE INACTIVATED), AND INFLUENZA B VIRUS B/WASHINGTON/02/2019 ANTIGEN (FORMALDEHYDE INACTIVATED) 0.7 ML: 60; 60; 60; 60 INJECTION, SUSPENSION INTRAMUSCULAR at 13:53

## 2021-09-30 ASSESSMENT — PAIN SCALES - GENERAL: PAINLEVEL: NO PAIN (0)

## 2021-09-30 NOTE — LETTER
"    9/30/2021         RE: Quiana Dunaway  [address] [facility] [address]        Dear Colleague,    Thank you for referring your patient, Quiana Dunaway, to the [facility] [place]. Please see a copy of my visit note below.    Oncology Rooming Note    September 30, 2021 1:22 PM   Quiana Dunaway is a 86 year old female who presents for:    Chief Complaint   Patient presents with     Oncology Clinic Visit     Initial Vitals: There were no vitals taken for this visit. Estimated body mass index is 25.97 kg/m  as calculated from the following:    Height as of 8/30/21: 1.575 m (5' 2\").    Weight as of 8/30/21: 64.4 kg (142 lb). There is no height or weight on file to calculate BSA.  Data Unavailable Comment: Data Unavailable   No LMP recorded. Patient is postmenopausal.  Allergies reviewed: Yes  Medications reviewed: Yes    Medications: MEDICATION REFILLS NEEDED TODAY. Provider was notified.   Compazine  Pharmacy name entered into [facility]:    [facility] - [place], [place] - [address]  [id number] - [place], [place] - [address] AT [facility]    Clinical concerns: edema - feet GK was notified.      Shari J. Schoenberger, CMA                  Oncology/Hematology Visit Note  Sep 30, 2021    Reason for Visit: follow up of multiple myeloma    Patient is currently taking Revlimid 10 mg 2 weeks on 1 week off  Dexamethasone 4 mg once a week  and Zometa every 3 months    Interval History:  Patient reports overall she is feeling well.  Complains of left leg swelling, patient denies left leg pain, denies redness, denies injury.  Denies cough shortness of breath chest pain denies nausea vomiting  Reports she is tolerating Revlimid well           Review of Systems:  14 point ROS of systems including Constitutional, Eyes, Respiratory, Cardiovascular, Gastroenterology, Genitourinary, Integumentary, Muscularskeletal, Psychiatric were " all negative except for pertinent positives noted in my HPI.    Physical Examination:  /63   Pulse 69   Temp 98.8  F (37.1  C) (Oral)   Resp 18   Wt 64.9 kg (143 lb)   SpO2 95%   BMI 26.16 kg/m      General: The patient is a pleasant female in no acute distress.  HEENT: EOMI, PERRL. Sclerae are anicteric. Oral mucosa is pink and moist with no lesions or thrush.   Lymph: Neck is supple with no lymphadenopathy in the cervical or supraclavicular areas.   Heart: Regular rate and rhythm.   Lungs: Clear to auscultation bilaterally.   GI: Bowel sounds present, soft, nontender with no palpable hepatosplenomegaly or masses.   Extremities: Mild edema left leg.  Nontender no redness.  No calf pain Skin: No rashes, petechiae, or bruising noted on exposed skin.    Laboratory Data:  CBC CMP results reviewed  Myeloma labs are pending      Assessment and Plan:      This is a 86-year-old female with    multiple myeloma  Currently getting treatment with   Revlimid 10 mg p.o. 14 days on 7 days off dexamethasone 4 mg a week  Light chains are slowly rising up  Per Dr. Parry continue with the same treatment for now  -Myeloma labs are pending from today  Patient has follow-up appointment with Dr. Parry next month    Bone health  Patient has been tolerating Zometa well continue with Zometa every 3 months  Labs reviewed okay to proceed with Zometa    Hypokalemia  Give potassium today per pharmacy protocol    Left leg edema  Order LLE ultrasound to rule out DVT      Anemia  Overall stable hemoglobin  Continue to monitor      Clinic with any changes in health condition or questions      MARIUSZ Mckeon CNP  [facility]     Chart documentation with Dragon Voice recognition Software. Although reviewed after completion, some words and grammatical errors may remain.            Again, thank you for allowing me to participate in the care of your patient.        Sincerely,        MARIUSZ Mckeon CNP

## 2021-09-30 NOTE — PROGRESS NOTES
Infusion Nursing Note:  Quiana Dunaway presents today for zometa and potassium replace.    Patient seen by provider today: Yes: Juaquin   present during visit today: Not Applicable.    Note: Flu shot given in clinic prior to appt.      Intravenous Access:  Peripheral IV placed.    Treatment Conditions:  Lab Results   Component Value Date     09/30/2021    POTASSIUM 3.3 (L) 09/30/2021    MAG 2.4 (H) 05/26/2020    CR 0.81 09/30/2021    HUMBERTO 8.4 (L) 09/30/2021    BILITOTAL 0.7 09/30/2021    ALBUMIN 3.2 (L) 09/30/2021    ALT 19 09/30/2021    AST 9 09/30/2021     Results reviewed, labs MET treatment parameters, ok to proceed with treatment.  Not Applicable.      Post Infusion Assessment:  Patient tolerated infusion without incident.  Blood return noted pre and post infusion.  Site patent and intact, free from redness, edema or discomfort.  No evidence of extravasations.  Access discontinued per protocol.       Discharge Plan:   Discharge instructions reviewed with: Patient.  Patient and/or family verbalized understanding of discharge instructions and all questions answered.  Patient discharged in stable condition accompanied by: self.  Departure Mode: Ambulatory.      Jessica Shetty RN

## 2021-09-30 NOTE — PROGRESS NOTES
"Oncology Rooming Note    September 30, 2021 1:22 PM   Quiana Dunaway is a 86 year old female who presents for:    Chief Complaint   Patient presents with     Oncology Clinic Visit     Initial Vitals: There were no vitals taken for this visit. Estimated body mass index is 25.97 kg/m  as calculated from the following:    Height as of 8/30/21: 1.575 m (5' 2\").    Weight as of 8/30/21: 64.4 kg (142 lb). There is no height or weight on file to calculate BSA.  Data Unavailable Comment: Data Unavailable   No LMP recorded. Patient is postmenopausal.  Allergies reviewed: Yes  Medications reviewed: Yes    Medications: MEDICATION REFILLS NEEDED TODAY. Provider was notified.   Compazine  Pharmacy name entered into [employer]:    [facility] - [place], [place] - [address]  [id number] - [place], [place] - [address] AT [facility]    Clinical concerns: edema - feet GK was notified.      Shari J. Schoenberger, [facility]            "

## 2021-09-30 NOTE — LETTER
October 1, 2021      Quiana B Emelyn  [address] [facility] [address]        Dear ,    We are writing to inform you of your test results.    The following letter pertains to your most recent diagnostic tests:     The vitamin B12 level is too low.  You should increase your vitamin B12 intake by switching from an over the counter B complex vitamin to an prescription B12 supplement.  I sent an prescription for higher dose supplement to your pharmacy.  You should start taking that supplement and we should recheck your vitamin B12 level after you have been taking the supplement for 1-2 months.  I ordered the blood test.         Resulted Orders   Vitamin B12   Result Value Ref [place]    Vitamin B12 276 193 - 986 pg/mL       If you have any questions or concerns, please call the clinic at the number listed above.       Sincerely,      César Chung MD

## 2021-10-01 ENCOUNTER — TELEPHONE (OUTPATIENT)
Dept: PHARMACY | Facility: CLINIC | Age: 86
End: 2021-10-01

## 2021-10-01 LAB
ALBUMIN SERPL ELPH-MCNC: 3.8 G/DL (ref 3.7–5.1)
ALPHA1 GLOB SERPL ELPH-MCNC: 0.3 G/DL (ref 0.2–0.4)
ALPHA2 GLOB SERPL ELPH-MCNC: 0.7 G/DL (ref 0.5–0.9)
B-GLOBULIN SERPL ELPH-MCNC: 0.6 G/DL (ref 0.6–1)
GAMMA GLOB SERPL ELPH-MCNC: 0.9 G/DL (ref 0.7–1.6)
IGG SERPL-MCNC: 878 MG/DL (ref 610–1616)
KAPPA LC FREE SER-MCNC: 27.92 MG/DL (ref 0.33–1.94)
KAPPA LC FREE/LAMBDA FREE SER NEPH: 41.06 {RATIO} (ref 0.26–1.65)
LAMBDA LC FREE SERPL-MCNC: 0.68 MG/DL (ref 0.57–2.63)
M PROTEIN SERPL ELPH-MCNC: 0.7 G/DL
PROT PATTERN SERPL ELPH-IMP: ABNORMAL

## 2021-10-01 PROCEDURE — 84165 PROTEIN E-PHORESIS SERUM: CPT | Mod: 26 | Performed by: STUDENT IN AN ORGANIZED HEALTH CARE EDUCATION/TRAINING PROGRAM

## 2021-10-01 NOTE — RESULT ENCOUNTER NOTE
The following letter pertains to your most recent diagnostic tests:    The vitamin B12 level is too low.  You should increase your vitamin B12 intake by switching from an over the counter B complex vitamin to an prescription B12 supplement.  I sent an prescription for higher dose supplement to your pharmacy.  You should start taking that supplement and we should recheck your vitamin B12 level after you have been taking the supplement for 1-2 months.  I ordered the blood test.        Sincerely,    Dr. Chung

## 2021-10-01 NOTE — TELEPHONE ENCOUNTER
Oral Chemotherapy Monitoring Program   Medication: Revlimid  Rx: 10mg PO daily on days 1 through 14 of 21 day cycle   Auth #: [id number]  Obtained on: 10/1/2021  Risk Category: adult female of non child bearing potential  Routine survey questions reviewed.   Rx to be Escribed to [facility]     Terrie Bowen [facility] Oncology Pharmacy Liaison  [phone]

## 2021-10-12 ENCOUNTER — OFFICE VISIT (OUTPATIENT)
Dept: FAMILY MEDICINE | Facility: CLINIC | Age: 86
End: 2021-10-12
Payer: MEDICARE

## 2021-10-12 VITALS
TEMPERATURE: 99.1 F | HEIGHT: 62 IN | RESPIRATION RATE: 16 BRPM | OXYGEN SATURATION: 96 % | HEART RATE: 77 BPM | SYSTOLIC BLOOD PRESSURE: 104 MMHG | DIASTOLIC BLOOD PRESSURE: 67 MMHG | BODY MASS INDEX: 26.11 KG/M2 | WEIGHT: 141.9 LBS

## 2021-10-12 DIAGNOSIS — I10 BENIGN ESSENTIAL HYPERTENSION: Primary | ICD-10-CM

## 2021-10-12 DIAGNOSIS — K14.8 TONGUE LESION: ICD-10-CM

## 2021-10-12 DIAGNOSIS — C90.00 MULTIPLE MYELOMA NOT HAVING ACHIEVED REMISSION (H): ICD-10-CM

## 2021-10-12 DIAGNOSIS — E53.8 VITAMIN B12 DEFICIENCY (NON ANEMIC): ICD-10-CM

## 2021-10-12 DIAGNOSIS — F41.1 GAD (GENERALIZED ANXIETY DISORDER): ICD-10-CM

## 2021-10-12 PROCEDURE — 99213 OFFICE O/P EST LOW 20 MIN: CPT | Performed by: INTERNAL MEDICINE

## 2021-10-12 RX ORDER — TRIAMCINOLONE ACETONIDE 0.1 %
PASTE (GRAM) DENTAL
Qty: 5 G | Refills: 3 | Status: SHIPPED | OUTPATIENT
Start: 2021-10-12 | End: 2022-06-28

## 2021-10-12 ASSESSMENT — MIFFLIN-ST. JEOR: SCORE: 1041.9

## 2021-10-12 NOTE — PROGRESS NOTES
Assessment & Plan     Tongue lesion  I think that these are inflammatory lesions from her edentulous state, I recommended a visit with the dentist to see if more can be done to help control them, until then, she can use the Kenalog in [employer]  - triamcinolone (KENALOG) 0.1 % paste; Apply to tongue lesion twice daily for one week.    Benign essential hypertension  Well-controlled    Multiple myeloma not having achieved remission (H)  Stable, continue follow-up with oncology    ROBER (generalized anxiety disorder)  Stable on Lexapro, continue current dose    Vitamin B12 deficiency (non anemic)  Start prescription supplement, recheck B12 level after 1 to 2 months of therapy        20 minutes spent on the date of the encounter doing chart review, history and exam, documentation and further activities per the note         Return in about 2 months (around 12/12/2021) for recheck.  Patient instructed to return to clinic or contact us sooner if symptoms worsen or new symptoms develop.     César Chung MD  [facility] MAGALY Araya is a 86 year old who presents for the following health issues  accompanied by her daughter-in-law:    HPI very pleasant 86-year-old female who is being treated for multiple myeloma who also has diastolic congestive heart failure, chronic kidney disease, anxiety, hypertension    She presents with her daughter-in-law as usual for her routine check ins with me.  She finds that checking in with me frequently helps her manage her anxiety in the setting of her multiple serious medical conditions.    Today she is feeling well and her cancer therapy is going well.  It was discovered that her vitamin B12 level was low.  She has not started the supplement yet.  She has recurrent sores in her mouth that have been present since she lost her dentures several months ago.  Kenalog with [employer] seem to help in the past and she asked for refill.  She has no plans to return to a  dentist to have her self refitted for dentures    Review of Systems         Objective    There were no vitals taken for this visit.  There is no height or weight on file to calculate BMI.  Physical Exam   General: This is a well-appearing female in no acute distress.  Cardiovascular: The heart has a regular rate and rhythm.  Pulmonary: The lungs are clear to auscultation bilaterally, breathing does not appear labored.  Mouth: There is a raised area on the lateral aspect of the tongue that is mildly tender to touch

## 2021-10-19 DIAGNOSIS — C90.00 MULTIPLE MYELOMA NOT HAVING ACHIEVED REMISSION (H): Primary | ICD-10-CM

## 2021-10-20 DIAGNOSIS — C90.00 MULTIPLE MYELOMA NOT HAVING ACHIEVED REMISSION (H): Primary | ICD-10-CM

## 2021-10-20 RX ORDER — LENALIDOMIDE 10 MG/1
10 CAPSULE ORAL DAILY
Qty: 14 CAPSULE | Refills: 0 | Status: SHIPPED | OUTPATIENT
Start: 2021-10-20 | End: 2021-12-27

## 2021-10-20 RX ORDER — DEXAMETHASONE 4 MG/1
4 TABLET ORAL WEEKLY
Qty: 15 TABLET | Refills: 0 | Status: SHIPPED | OUTPATIENT
Start: 2021-10-20 | End: 2021-12-27

## 2021-10-28 ENCOUNTER — LAB (OUTPATIENT)
Dept: INFUSION THERAPY | Facility: CLINIC | Age: 86
End: 2021-10-28
Attending: INTERNAL MEDICINE
Payer: MEDICARE

## 2021-10-28 ENCOUNTER — ONCOLOGY VISIT (OUTPATIENT)
Dept: ONCOLOGY | Facility: CLINIC | Age: 86
End: 2021-10-28
Attending: INTERNAL MEDICINE
Payer: MEDICARE

## 2021-10-28 VITALS
RESPIRATION RATE: 16 BRPM | DIASTOLIC BLOOD PRESSURE: 55 MMHG | HEART RATE: 80 BPM | TEMPERATURE: 98.1 F | OXYGEN SATURATION: 96 % | SYSTOLIC BLOOD PRESSURE: 123 MMHG | BODY MASS INDEX: 26.24 KG/M2 | WEIGHT: 142.6 LBS | HEIGHT: 62 IN

## 2021-10-28 DIAGNOSIS — C90.00 MULTIPLE MYELOMA NOT HAVING ACHIEVED REMISSION (H): Primary | ICD-10-CM

## 2021-10-28 LAB
ALBUMIN SERPL-MCNC: 3.2 G/DL (ref 3.4–5)
ALP SERPL-CCNC: 65 U/L (ref 40–150)
ALT SERPL W P-5'-P-CCNC: 19 U/L (ref 0–50)
ANION GAP SERPL CALCULATED.3IONS-SCNC: 4 MMOL/L (ref 3–14)
AST SERPL W P-5'-P-CCNC: 10 U/L (ref 0–45)
BASOPHILS # BLD AUTO: 0 10E3/UL (ref 0–0.2)
BASOPHILS NFR BLD AUTO: 1 %
BILIRUB SERPL-MCNC: 0.4 MG/DL (ref 0.2–1.3)
BUN SERPL-MCNC: 21 MG/DL (ref 7–30)
CALCIUM SERPL-MCNC: 8.3 MG/DL (ref 8.5–10.1)
CHLORIDE BLD-SCNC: 110 MMOL/L (ref 94–109)
CO2 SERPL-SCNC: 28 MMOL/L (ref 20–32)
CREAT SERPL-MCNC: 0.7 MG/DL (ref 0.52–1.04)
EOSINOPHIL # BLD AUTO: 0.2 10E3/UL (ref 0–0.7)
EOSINOPHIL NFR BLD AUTO: 4 %
ERYTHROCYTE [DISTWIDTH] IN BLOOD BY AUTOMATED COUNT: 13 % (ref 10–15)
GFR SERPL CREATININE-BSD FRML MDRD: 79 ML/MIN/1.73M2
GLUCOSE BLD-MCNC: 96 MG/DL (ref 70–99)
HCT VFR BLD AUTO: 32.3 % (ref 35–47)
HGB BLD-MCNC: 10.7 G/DL (ref 11.7–15.7)
IMM GRANULOCYTES # BLD: 0 10E3/UL
IMM GRANULOCYTES NFR BLD: 0 %
LYMPHOCYTES # BLD AUTO: 1.4 10E3/UL (ref 0.8–5.3)
LYMPHOCYTES NFR BLD AUTO: 39 %
MCH RBC QN AUTO: 31.3 PG (ref 26.5–33)
MCHC RBC AUTO-ENTMCNC: 33.1 G/DL (ref 31.5–36.5)
MCV RBC AUTO: 94 FL (ref 78–100)
MONOCYTES # BLD AUTO: 0.5 10E3/UL (ref 0–1.3)
MONOCYTES NFR BLD AUTO: 15 %
NEUTROPHILS # BLD AUTO: 1.5 10E3/UL (ref 1.6–8.3)
NEUTROPHILS NFR BLD AUTO: 41 %
NRBC # BLD AUTO: 0 10E3/UL
NRBC BLD AUTO-RTO: 0 /100
PLATELET # BLD AUTO: 133 10E3/UL (ref 150–450)
POTASSIUM BLD-SCNC: 3.4 MMOL/L (ref 3.4–5.3)
PROT SERPL-MCNC: 6.6 G/DL (ref 6.8–8.8)
RBC # BLD AUTO: 3.42 10E6/UL (ref 3.8–5.2)
SODIUM SERPL-SCNC: 142 MMOL/L (ref 133–144)
TOTAL PROTEIN SERUM FOR ELP: 6.3 G/DL (ref 6.8–8.8)
WBC # BLD AUTO: 3.7 10E3/UL (ref 4–11)

## 2021-10-28 PROCEDURE — 36415 COLL VENOUS BLD VENIPUNCTURE: CPT | Performed by: INTERNAL MEDICINE

## 2021-10-28 PROCEDURE — G0463 HOSPITAL OUTPT CLINIC VISIT: HCPCS

## 2021-10-28 PROCEDURE — 82784 ASSAY IGA/IGD/IGG/IGM EACH: CPT | Performed by: INTERNAL MEDICINE

## 2021-10-28 PROCEDURE — 84155 ASSAY OF PROTEIN SERUM: CPT | Mod: 91 | Performed by: INTERNAL MEDICINE

## 2021-10-28 PROCEDURE — 84165 PROTEIN E-PHORESIS SERUM: CPT | Mod: TC | Performed by: PATHOLOGY

## 2021-10-28 PROCEDURE — 99214 OFFICE O/P EST MOD 30 MIN: CPT | Performed by: INTERNAL MEDICINE

## 2021-10-28 PROCEDURE — 85025 COMPLETE CBC W/AUTO DIFF WBC: CPT | Performed by: INTERNAL MEDICINE

## 2021-10-28 PROCEDURE — 83883 ASSAY NEPHELOMETRY NOT SPEC: CPT | Performed by: INTERNAL MEDICINE

## 2021-10-28 PROCEDURE — 82040 ASSAY OF SERUM ALBUMIN: CPT | Performed by: INTERNAL MEDICINE

## 2021-10-28 RX ORDER — ZOLEDRONIC ACID 0.04 MG/ML
4 INJECTION, SOLUTION INTRAVENOUS ONCE
Status: CANCELLED | OUTPATIENT
Start: 2021-12-27 | End: 2021-12-23

## 2021-10-28 ASSESSMENT — MIFFLIN-ST. JEOR: SCORE: 1045.08

## 2021-10-28 ASSESSMENT — PAIN SCALES - GENERAL: PAINLEVEL: NO PAIN (0)

## 2021-10-28 NOTE — PROGRESS NOTES
Medical Assistant Note:  Quiana Dunaway presents today for lab draw.    Patient seen by provider today: Yes: BK.   present during visit today: Not Applicable.    Concerns: No Concerns.    Procedure:  Lab draw site: LAC, Needle type: Butterfly, Gauge: 23.    Post Assessment:  Labs drawn without difficulty: Yes.    Discharge Plan:  Departure Mode: Ambulatory.    Face to Face Time: 4 min.    Shari Schoenberger, CMA

## 2021-10-28 NOTE — LETTER
"    10/28/2021         RE: Quiana Dunaway  [address] [facility] [address]        Dear Colleague,    Thank you for referring your patient, Quiana Dunaway, to the [facility]. Please see a copy of my visit note below.    Oncology Rooming Note    October 28, 2021 1:29 PM   Quiana Dunaway is a 86 year old female who presents for:    Chief Complaint   Patient presents with     Oncology Clinic Visit     Multiple myeloma not having achieved remission      Initial Vitals: There were no vitals taken for this visit. Estimated body mass index is 25.95 kg/m  as calculated from the following:    Height as of 10/12/21: 1.575 m (5' 2\").    Weight as of 10/12/21: 64.4 kg (141 lb 14.4 oz). There is no height or weight on file to calculate BSA.  Data Unavailable Comment: Data Unavailable   No LMP recorded. Patient is postmenopausal.  Allergies reviewed: Yes  Medications reviewed: Yes    Medications: MEDICATION REFILLS NEEDED TODAY. Provider was notified.  Pharmacy name entered into [facility]:    [facility] - [place], [place] - [address]  [id number] - [place], [place] - [address] AT [facility]    Clinical concerns: Refill needed for Compazine       Farida Danielle MA                HEMATOLOGY HISTORY: Mrs. Dunaway is a lady with multiple myeloma (IgG kappa). High risk, t(14;16).  1. On 05/01/2017, WBC of 3.4, hemoglobin of 10.2 and platelet of 154.  2. SPEP on 07/07/2017 revealed M-spike of 1.8.  3. Bone marrow biopsy on 07/12/2017 reveals kappa monotypic plasma cell population consistent with multiple myeloma.  Bone marrow is 70% cellular.  Plasma cell is 50-60%.     -There is gain of chromosome 1, 5, 9, 15, and 17.  There is translocation 14;16.   4.  On 07/18/2017:  -M-spike of 1.9.   -Immunofixation reveals monoclonal IgG kappa.    -IgG level of 2970.  IgA of 15 and IgM of 175.    -Kappa free light chain of 67.7. "  Lambda free light chain of 1.42.  Ratio of kappa to lambda of 47.71.    -Beta 2 microglobulin of 3.4.   5. PET scan on 07/24/2017 reveals several focal areas of increased FDG uptake consistent with multiple myeloma.  There is probable epithelial cyst in tail of the pancreas.  No associated abnormal FDG activity.  Left thyroid cysts or nodules without any FDG activity.  There is a 0.3 cm left upper lobe lung nodule.   8.  Velcade, Revlimid and dexamethasone between on 08/14/2017 and 04/30/2018. Velcade stopped.   -Revlimid and dexamethasone continued.    SUBJECTIVE:  Ms. Dunaway is an 86-year-old female with high-risk myeloma.  She is currently on Revlimid and dexamethasone.  Revlimid is at 10 mg a day, 2 weeks on and 1 week off.  Dexamethasone at 4 mg weekly.     She is doing well for her age.  Some fatigue.  No headache.  No dizziness.  No chest pain.  No shortness of breath.  No nausea or vomiting.  Appetite has been fairly good.  No urinary or bowel complaints.      All other review of systems is negative.     PHYSICAL EXAMINATION:   GENERAL:  Alert and oriented x 3.   VITAL SIGNS:  Reviewed.  ECOG PS of 2.     EYES:  No icterus.   THROAT:  No ulcer. No thrush.   NECK:  Supple. No lymphadenopathy. No thyromegaly.   AXILLAE:  No lymphadenopathy.   LUNGS:  Good air entry bilaterally.  No crackles or wheezing.   HEART:  Regular.  No murmur.   GI: Abdomen is soft.  Nontender. No mass. Difficult palpation because of her weight.  EXTREMITIES:  No pedal edema.  No calf swelling or tenderness.   SKIN:  No rash.     LABS:  CBC, CMP, SPEP, free light chain and IgG reviewed.     ASSESSMENT:    1.  An 86-year-old female with high-risk multiple myeloma on Revlimid and dexamethasone.  2.  Fatigue.  3.  Anemia, stable.  4.  Thrombocytopenia.  5.   Leukopenia.     PLAN:    1.  The patient clinically is doing well.  She does not have any new symptoms.  She has fatigue, but it has not gotten worse.  2.  Discussed regarding  myeloma.  Overall, disease has been stable.  She will continue on Revlimid and dexamethasone.  She is tolerating them well.  3. She gets Zometa every 3 months.  That will be continued.  4.  She will continue on prophylactic aspirin and acyclovir.  5.  I will see her in 2 months' time.  I advised her to call us with any questions or concerns.    This office note has been dictated.          Again, thank you for allowing me to participate in the care of your patient.        Sincerely,        Alex Parry MD

## 2021-10-28 NOTE — PROGRESS NOTES
"Oncology Rooming Note    October 28, 2021 1:29 PM   Quiana Dunaway is a 86 year old female who presents for:    Chief Complaint   Patient presents with     Oncology Clinic Visit     Multiple myeloma not having achieved remission      Initial Vitals: There were no vitals taken for this visit. Estimated body mass index is 25.95 kg/m  as calculated from the following:    Height as of 10/12/21: 1.575 m (5' 2\").    Weight as of 10/12/21: 64.4 kg (141 lb 14.4 oz). There is no height or weight on file to calculate BSA.  Data Unavailable Comment: Data Unavailable   No LMP recorded. Patient is postmenopausal.  Allergies reviewed: Yes  Medications reviewed: Yes    Medications: MEDICATION REFILLS NEEDED TODAY. Provider was notified.  Pharmacy name entered into [employer]:    [facility] - [place], [place] - [address] [id number] - [place], [place] - [address] AT [facility]    Clinical concerns: Refill needed for Compazine       Farida Danielle MA              "

## 2021-10-28 NOTE — PATIENT INSTRUCTIONS
1. Continue revlimid and dexamethasone.  2. Continue aspirin and acyclovir.  3. See me in 2 months with labs.  -Zometa on same day.  4. Monthly labs as per protocol.     Please call to schedule

## 2021-10-29 LAB
ALBUMIN SERPL ELPH-MCNC: 3.7 G/DL (ref 3.7–5.1)
ALPHA1 GLOB SERPL ELPH-MCNC: 0.4 G/DL (ref 0.2–0.4)
ALPHA2 GLOB SERPL ELPH-MCNC: 0.8 G/DL (ref 0.5–0.9)
B-GLOBULIN SERPL ELPH-MCNC: 0.6 G/DL (ref 0.6–1)
GAMMA GLOB SERPL ELPH-MCNC: 0.9 G/DL (ref 0.7–1.6)
IGG SERPL-MCNC: 880 MG/DL (ref 610–1616)
KAPPA LC FREE SER-MCNC: 27.59 MG/DL (ref 0.33–1.94)
KAPPA LC FREE/LAMBDA FREE SER NEPH: 42.45 {RATIO} (ref 0.26–1.65)
LAMBDA LC FREE SERPL-MCNC: 0.65 MG/DL (ref 0.57–2.63)
M PROTEIN SERPL ELPH-MCNC: 0.7 G/DL
PROT PATTERN SERPL ELPH-IMP: ABNORMAL

## 2021-10-29 PROCEDURE — 84165 PROTEIN E-PHORESIS SERUM: CPT | Mod: 26 | Performed by: PATHOLOGY

## 2021-10-31 NOTE — RESULT ENCOUNTER NOTE
Dear Ms. Dunaway,    Blood test reveal myeloma to be stable.    Please, call me with any questions.    Alex Parry MD

## 2021-11-06 ENCOUNTER — HEALTH MAINTENANCE LETTER (OUTPATIENT)
Age: 86
End: 2021-11-06

## 2021-11-07 NOTE — PROGRESS NOTES
HEMATOLOGY HISTORY: Mrs. Dunaway is a lady with multiple myeloma (IgG kappa). High risk, t(14;16).  1. On 05/01/2017, WBC of 3.4, hemoglobin of 10.2 and platelet of 154.  2. SPEP on 07/07/2017 revealed M-spike of 1.8.  3. Bone marrow biopsy on 07/12/2017 reveals kappa monotypic plasma cell population consistent with multiple myeloma.  Bone marrow is 70% cellular.  Plasma cell is 50-60%.     -There is gain of chromosome 1, 5, 9, 15, and 17.  There is translocation 14;16.   4.  On 07/18/2017:  -M-spike of 1.9.   -Immunofixation reveals monoclonal IgG kappa.    -IgG level of 2970.  IgA of 15 and IgM of 175.    -Kappa free light chain of 67.7.  Lambda free light chain of 1.42.  Ratio of kappa to lambda of 47.71.    -Beta 2 microglobulin of 3.4.   5. PET scan on 07/24/2017 reveals several focal areas of increased FDG uptake consistent with multiple myeloma.  There is probable epithelial cyst in tail of the pancreas.  No associated abnormal FDG activity.  Left thyroid cysts or nodules without any FDG activity.  There is a 0.3 cm left upper lobe lung nodule.   8.  Velcade, Revlimid and dexamethasone between on 08/14/2017 and 04/30/2018. Velcade stopped.   -Revlimid and dexamethasone continued.    SUBJECTIVE:  Ms. Dunaway is an 86-year-old female with high-risk myeloma.  She is currently on Revlimid and dexamethasone.  Revlimid is at 10 mg a day, 2 weeks on and 1 week off.  Dexamethasone at 4 mg weekly.     She is doing well for her age.  Some fatigue.  No headache.  No dizziness.  No chest pain.  No shortness of breath.  No nausea or vomiting.  Appetite has been fairly good.  No urinary or bowel complaints.      All other review of systems is negative.     PHYSICAL EXAMINATION:   GENERAL:  Alert and oriented x 3.   VITAL SIGNS:  Reviewed.  ECOG PS of 2.     EYES:  No icterus.   THROAT:  No ulcer. No thrush.   NECK:  Supple. No lymphadenopathy. No thyromegaly.   AXILLAE:  No lymphadenopathy.   LUNGS:  Good air entry  bilaterally.  No crackles or wheezing.   HEART:  Regular.  No murmur.   GI: Abdomen is soft.  Nontender. No mass. Difficult palpation because of her weight.  EXTREMITIES:  No pedal edema.  No calf swelling or tenderness.   SKIN:  No rash.     LABS:  CBC, CMP, SPEP, free light chain and IgG reviewed.     ASSESSMENT:    1.  An 86-year-old female with high-risk multiple myeloma on Revlimid and dexamethasone.  2.  Fatigue.  3.  Anemia, stable.  4.  Thrombocytopenia.  5.   Leukopenia.     PLAN:    1.  The patient clinically is doing well.  She does not have any new symptoms.  She has fatigue, but it has not gotten worse.  2.  Discussed regarding myeloma.  Overall, disease has been stable.  She will continue on Revlimid and dexamethasone.  She is tolerating them well.  3. She gets Zometa every 3 months.  That will be continued.  4.  She will continue on prophylactic aspirin and acyclovir.  5.  I will see her in 2 months' time.  I advised her to call us with any questions or concerns.

## 2021-11-11 ENCOUNTER — NURSE TRIAGE (OUTPATIENT)
Dept: FAMILY MEDICINE | Facility: CLINIC | Age: 86
End: 2021-11-11
Payer: MEDICARE

## 2021-11-11 DIAGNOSIS — C90.00 MULTIPLE MYELOMA NOT HAVING ACHIEVED REMISSION (H): Primary | ICD-10-CM

## 2021-11-11 NOTE — TELEPHONE ENCOUNTER
Patient states her blood pressure is now 159/69. States that she continues to have no symptoms.    Patient given message from Dr. Chung.    Patient agrees to take lisinopril 10 mg daily and call back if remains greater than 150/90.    Gisselle Quiroz RN

## 2021-11-11 NOTE — TELEPHONE ENCOUNTER
"Nurse Triage SBAR    Is this a 2nd Level Triage? YES, LICENSED PRACTITIONER REVIEW IS REQUIRED    Situation  : reports started with high blood pressure last evening. This morning 182/98. Rechecked and it was 193/100. Only symptom is tingling in both feet. States that she has had this tingling off and on in the past, but not for a long time.  States that she took an extra lisinopril last evening and this morning without improvement of BP.    Background  : History of benign essential hypertension. States that she takes a chemo pill    Assessment : uncontrolled BP on current medication. No recent missed doses.     (See information below for more triage details.)    Recommendation : Routing to provider for recommendation on add on a telephone encounter today? Or send patient to [facility] or .    Protocol Recommended Disposition: Urgent office follow-up appointment     Reason for Disposition    Systolic BP >= 180 OR Diastolic >= 110    Additional Information    Negative: Sounds like a life-threatening emergency to the triager    Negative: Pregnant > 20 weeks or postpartum (< 6 weeks after delivery) and new hand or face swelling    Negative: Pregnant > 20 weeks and BP > 140/90    Negative: Systolic BP >= 160 OR Diastolic >= 100, and any cardiac or neurologic symptoms (e.g., chest pain, difficulty breathing, unsteady gait, blurred vision)    Negative: Patient sounds very sick or weak to the triager    Negative: BP Systolic BP >= 140 OR Diastolic >= 90 and postpartum (from 0 to 6 weeks after delivery)    Answer Assessment - Initial Assessment Questions  1. BLOOD PRESSURE: \"What is the blood pressure?\" \"Did you take at least two measurements 5 minutes apart?\"      At 10:30 today 182/98, at time of call 193/100. States that she just got a new machine yesterday.  2. ONSET: \"When did you take your blood pressure?\"      Last night high, but not as high  3. HOW: \"How did you obtain the blood pressure?\" (e.g., visiting nurse, " "automatic home BP monitor)      New electronic home cuff  4. HISTORY: \"Do you have a history of high blood pressure?\"      yes  5. MEDICATIONS: \"Are you taking any medications for blood pressure?\" \"Have you missed any doses recently?\"      lisinopril  6. OTHER SYMPTOMS: \"Do you have any symptoms?\" (e.g., headache, chest pain, blurred vision, difficulty breathing, weakness)      Generalized weakness. Patient states that she is not that strong due to her cancer, but is a little worse this morning.  7. PREGNANCY: \"Is there any chance you are pregnant?\" \"When was your last menstrual period?\"      NA    Protocols used: HIGH BLOOD PRESSURE-A-OH  Gisselle Quiroz RN      "

## 2021-11-11 NOTE — TELEPHONE ENCOUNTER
Patient Contact    Attempt # 1    Was call answered?  No.  Left message on voicemail with information to call triage back.    Grace LIGHT, Triage RN  [facility]

## 2021-11-11 NOTE — TELEPHONE ENCOUNTER
OK to increase lisinopril from 5 mg daily to 10 mg daily; call back if home blood pressures remain > 150/90 after changing

## 2021-11-12 ENCOUNTER — TELEPHONE (OUTPATIENT)
Dept: PHARMACY | Facility: CLINIC | Age: 86
End: 2021-11-12
Payer: MEDICARE

## 2021-11-12 RX ORDER — LENALIDOMIDE 10 MG/1
10 CAPSULE ORAL DAILY
Qty: 14 CAPSULE | Refills: 0 | Status: SHIPPED | OUTPATIENT
Start: 2021-11-12 | End: 2021-12-27

## 2021-11-12 RX ORDER — DEXAMETHASONE 4 MG/1
4 TABLET ORAL WEEKLY
Qty: 15 TABLET | Refills: 0 | Status: SHIPPED | OUTPATIENT
Start: 2021-11-12 | End: 2021-12-27

## 2021-11-13 NOTE — TELEPHONE ENCOUNTER
Oral Chemotherapy Monitoring Program   Medication: Revlimid  Rx: 10mg PO daily on days 1 through 14 of 21 day cycle   Auth #: [id number] obtained on 11/12/2021  Risk Category: adult female of non child bearing potential  Routine survey questions reviewed.   Rx to be Escribed to [facility]     Terrie Bowen [facility] Oncology Pharmacy Liaison  [phone]     no

## 2021-11-16 DIAGNOSIS — C90.00 MULTIPLE MYELOMA NOT HAVING ACHIEVED REMISSION (H): ICD-10-CM

## 2021-11-17 RX ORDER — PROCHLORPERAZINE MALEATE 10 MG
10 TABLET ORAL EVERY 6 HOURS PRN
Qty: 30 TABLET | Refills: 6 | Status: SHIPPED | OUTPATIENT
Start: 2021-11-17 | End: 2021-12-07

## 2021-11-29 ENCOUNTER — DOCUMENTATION ONLY (OUTPATIENT)
Dept: ONCOLOGY | Facility: CLINIC | Age: 86
End: 2021-11-29

## 2021-11-29 ENCOUNTER — LAB (OUTPATIENT)
Dept: INFUSION THERAPY | Facility: CLINIC | Age: 86
End: 2021-11-29
Attending: INTERNAL MEDICINE
Payer: MEDICARE

## 2021-11-29 DIAGNOSIS — E53.8 VITAMIN B12 DEFICIENCY (NON ANEMIC): ICD-10-CM

## 2021-11-29 DIAGNOSIS — C90.00 MULTIPLE MYELOMA NOT HAVING ACHIEVED REMISSION (H): ICD-10-CM

## 2021-11-29 LAB
ALBUMIN SERPL-MCNC: 3.4 G/DL (ref 3.4–5)
ALP SERPL-CCNC: 67 U/L (ref 40–150)
ALT SERPL W P-5'-P-CCNC: 17 U/L (ref 0–50)
ANION GAP SERPL CALCULATED.3IONS-SCNC: 8 MMOL/L (ref 3–14)
AST SERPL W P-5'-P-CCNC: 6 U/L (ref 0–45)
BASOPHILS # BLD AUTO: 0 10E3/UL (ref 0–0.2)
BASOPHILS NFR BLD AUTO: 0 %
BILIRUB SERPL-MCNC: 0.6 MG/DL (ref 0.2–1.3)
BUN SERPL-MCNC: 19 MG/DL (ref 7–30)
CALCIUM SERPL-MCNC: 8.5 MG/DL (ref 8.5–10.1)
CHLORIDE BLD-SCNC: 108 MMOL/L (ref 94–109)
CO2 SERPL-SCNC: 25 MMOL/L (ref 20–32)
CREAT SERPL-MCNC: 0.57 MG/DL (ref 0.52–1.04)
EOSINOPHIL # BLD AUTO: 0 10E3/UL (ref 0–0.7)
EOSINOPHIL NFR BLD AUTO: 0 %
ERYTHROCYTE [DISTWIDTH] IN BLOOD BY AUTOMATED COUNT: 13.3 % (ref 10–15)
GFR SERPL CREATININE-BSD FRML MDRD: 85 ML/MIN/1.73M2
GLUCOSE BLD-MCNC: 218 MG/DL (ref 70–99)
HCT VFR BLD AUTO: 35.4 % (ref 35–47)
HGB BLD-MCNC: 11.7 G/DL (ref 11.7–15.7)
IMM GRANULOCYTES # BLD: 0 10E3/UL
IMM GRANULOCYTES NFR BLD: 1 %
LYMPHOCYTES # BLD AUTO: 0.8 10E3/UL (ref 0.8–5.3)
LYMPHOCYTES NFR BLD AUTO: 23 %
MCH RBC QN AUTO: 31.2 PG (ref 26.5–33)
MCHC RBC AUTO-ENTMCNC: 33.1 G/DL (ref 31.5–36.5)
MCV RBC AUTO: 94 FL (ref 78–100)
MONOCYTES # BLD AUTO: 0.1 10E3/UL (ref 0–1.3)
MONOCYTES NFR BLD AUTO: 1 %
NEUTROPHILS # BLD AUTO: 2.6 10E3/UL (ref 1.6–8.3)
NEUTROPHILS NFR BLD AUTO: 75 %
NRBC # BLD AUTO: 0 10E3/UL
NRBC BLD AUTO-RTO: 0 /100
PLATELET # BLD AUTO: 166 10E3/UL (ref 150–450)
POTASSIUM BLD-SCNC: 3.7 MMOL/L (ref 3.4–5.3)
PROT SERPL-MCNC: 7 G/DL (ref 6.8–8.8)
RBC # BLD AUTO: 3.75 10E6/UL (ref 3.8–5.2)
SODIUM SERPL-SCNC: 141 MMOL/L (ref 133–144)
VIT B12 SERPL-MCNC: 1435 PG/ML (ref 193–986)
WBC # BLD AUTO: 3.5 10E3/UL (ref 4–11)

## 2021-11-29 PROCEDURE — 36415 COLL VENOUS BLD VENIPUNCTURE: CPT

## 2021-11-29 PROCEDURE — 82040 ASSAY OF SERUM ALBUMIN: CPT | Performed by: INTERNAL MEDICINE

## 2021-11-29 PROCEDURE — 85025 COMPLETE CBC W/AUTO DIFF WBC: CPT | Performed by: INTERNAL MEDICINE

## 2021-11-29 PROCEDURE — 82607 VITAMIN B-12: CPT | Performed by: INTERNAL MEDICINE

## 2021-11-29 NOTE — LETTER
December 1, 2021      Quiana Dunaway  [address] [facility] [address]        Dear ,      Good news!     -Your vitamin B12 level is normal for you.  Please keep taking your vitamin B12 supplement as you are.     If you have any questions or concerns, please call the clinic at the number listed above.    Sincerely,     Dr. Chung       Resulted Orders   **Vitamin B12 FUTURE 2mo   Result Value Ref Range    Vitamin B12 1,435 (H) 193 - 986 pg/mL

## 2021-11-29 NOTE — PROGRESS NOTES
Nursing Note:  Quiana Dunaway presents today for labs.    Patient seen by provider today: No   present during visit today: Not Applicable.    Note: N/A.    Intravenous Access:  Lab draw site left AC, Needle type butterfly, Gauge 21.  Labs drawn without difficulty.    Discharge Plan:   Patient was sent to home for next month's appointment.    Jessica Shetty RN

## 2021-11-29 NOTE — PROGRESS NOTES
Oral Chemotherapy Monitoring Program  Lab Follow Up    Reviewed lab results from today, 11/29/2021.    ORAL CHEMOTHERAPY 3/9/2021 4/12/2021 4/16/2021 5/5/2021 7/7/2021 8/25/2021 11/29/2021   Assessment Type Lab Monitoring Lab Monitoring Refill Lab Monitoring Quarterly Follow up Lab Monitoring Lab Monitoring   Diagnosis Code Multiple Myeloma Multiple Myeloma Multiple Myeloma Multiple Myeloma Multiple Myeloma Multiple Myeloma Multiple Myeloma   Providers Sohan Parry   Clinic Name/Location [facility]   Drug Name Revlimid (Lenalidomide) Revlimid (lenalidomide) Revlimid (lenalidomide) Revlimid (lenalidomide) Revlimid (lenalidomide) Revlimid (lenalidomide) Revlimid (lenalidomide)   Dose 10 mg 10 mg 10 mg 10 mg 10 mg 10 mg 10 mg   Current Schedule Daily Daily Daily Daily Daily Daily Daily   Cycle Details 2 weeks on, 1 week off 2 weeks on, 1 week off 2 weeks on, 1 week off 2 weeks on, 1 week off 2 weeks on, 1 week off 2 weeks on, 1 week off 2 weeks on, 1 week off   Start Date of Last Cycle 2/18/2021 4/1/2021 4/1/2021 4/22/2021 6/4/2021 - 11/12/2021   Planned next cycle start date 3/11/2021 4/22/2021 4/22/2021 5/13/2021 7/1/2021 - 12/3/2021   Doses missed in last 2 weeks - - - - - - -   Adherence Assessment - - - - Adherent - -   Adverse Effects - No AE identified during assessment - No AE identified during assessment No AE identified during assessment Thrombocytopenia -   Fatigue - - - - - - -   Pharmacist Intervention(fatigue) - - - - - - -   Thrombocytopenia - - - - - Grade 1 -   Pharmacist Intervention(thrombocytopenia) - - - - - No -   Home BPs - - - - not needed - -   Any new drug interactions? - - - - No - -   Is the dose as ordered appropriate for the patient? - - - - Yes - -   Is the patient currently in pain? - - - - - - -   Has the patient been assessed within the past 6 months for depression? - - - - - - -   Has the  patient missed any days of school, work, or other routine activity? - - - - - - -   Since the last time we talked, have you been hospitalized or used the emergency room? - - - - - - -       Labs:  _  Result Component Current Result Ref Range   Sodium 141 (11/29/2021) 133 - 144 mmol/L     _  Result Component Current Result Ref Range   Potassium 3.7 (11/29/2021) 3.4 - 5.3 mmol/L     _  Result Component Current Result Ref Range   Calcium 8.5 (11/29/2021) 8.5 - 10.1 mg/dL     No results found for Mag within last 30 days.     No results found for Phos within last 30 days.     _  Result Component Current Result Ref Range   Albumin 3.4 (11/29/2021) 3.4 - 5.0 g/dL     _  Result Component Current Result Ref Range   Urea Nitrogen 19 (11/29/2021) 7 - 30 mg/dL     _  Result Component Current Result Ref Range   Creatinine 0.57 (11/29/2021) 0.52 - 1.04 mg/dL     _  Result Component Current Result Ref Range   AST 6 (11/29/2021) 0 - 45 U/L     _  Result Component Current Result Ref Range   ALT 17 (11/29/2021) 0 - 50 U/L     _  Result Component Current Result Ref Range   Bilirubin Total 0.6 (11/29/2021) 0.2 - 1.3 mg/dL     _  Result Component Current Result Ref Range   WBC Count 3.5 (L) (11/29/2021) 4.0 - 11.0 10e3/uL     _  Result Component Current Result Ref Range   Hemoglobin 11.7 (11/29/2021) 11.7 - 15.7 g/dL     _  Result Component Current Result Ref Range   Platelet Count 166 (11/29/2021) 150 - 450 10e3/uL     No results found for ANC within last 30 days.       Assessment & Plan:  No concerning abnormalities.  WBC is down-trending but no dose adjustments are necessary.  Patient had some complaints of fatigue and was happy to hear her labs were normal.      Questions answered to patient's satisfaction.    Follow-Up:  12/27/2021 Labs and visit with Dr Parry.    Tessie Nayak, PharmD  Oral Chemotherapy Pharmacist  [phone]

## 2021-11-30 NOTE — RESULT ENCOUNTER NOTE
Dear Ms. uDnaway,    Blood tests are stable.    Please, call me with any questions.    Alex Parry MD

## 2021-12-01 NOTE — RESULT ENCOUNTER NOTE
The following letter pertains to your most recent diagnostic tests:    Good news!     -Your vitamin B12 level is normal for you.  Please keep taking your vitamin B12 supplement as you are.      Sincerely,    Dr. Chung

## 2021-12-02 DIAGNOSIS — C90.00 MULTIPLE MYELOMA NOT HAVING ACHIEVED REMISSION (H): Primary | ICD-10-CM

## 2021-12-03 ENCOUNTER — TELEPHONE (OUTPATIENT)
Dept: PHARMACY | Facility: CLINIC | Age: 86
End: 2021-12-03
Payer: MEDICARE

## 2021-12-03 DIAGNOSIS — C90.00 MULTIPLE MYELOMA NOT HAVING ACHIEVED REMISSION (H): Primary | ICD-10-CM

## 2021-12-03 RX ORDER — DEXAMETHASONE 4 MG/1
4 TABLET ORAL WEEKLY
Qty: 3 TABLET | Refills: 0 | Status: SHIPPED | OUTPATIENT
Start: 2021-12-03 | End: 2021-12-27

## 2021-12-03 RX ORDER — LENALIDOMIDE 10 MG/1
10 CAPSULE ORAL DAILY
Qty: 14 CAPSULE | Refills: 0 | Status: SHIPPED | OUTPATIENT
Start: 2021-12-03 | End: 2021-12-27

## 2021-12-03 NOTE — TELEPHONE ENCOUNTER
Oral Chemotherapy Monitoring Program   Medication: Revlimid  Rx: 10mg PO daily on days 1 through 14 of 21 day cycle   Auth #: [id number] obtained on 12/3/2021  Risk Category: adult female of non child bearing potential  Routine survey questions reviewed.   [facility]     Terrie Bowen [facility] Oncology Pharmacy Liaison  [phone]

## 2021-12-06 ENCOUNTER — TELEPHONE (OUTPATIENT)
Dept: ONCOLOGY | Facility: CLINIC | Age: 86
End: 2021-12-06
Payer: MEDICARE

## 2021-12-06 DIAGNOSIS — C90.00 MULTIPLE MYELOMA NOT HAVING ACHIEVED REMISSION (H): ICD-10-CM

## 2021-12-06 NOTE — TELEPHONE ENCOUNTER
"Pt daughter calling in to report symptoms the pt is having. Daughter reporting that pt has a \"bad cold\", sore throat, poor appetite, lethargy, and congestion. She reports that all of these symptoms started last Thursday 11/2. Pt is still able to eat and drink but just has a reduced appetite.     She denies that the pt is having a fever. She endorses SOB but this has been a chronic symptoms due to cancer per the daughter.    Pt had a COVID and strep throat test done on 11/4 and they both had negative result. Daughter is wondering if pt should be seen in clinic.    Denies fevers/chills, n/v, bowel & bladder issues, abdominal pain, rash, new or increased bleeding.    Advised to have pt allow frequent rest periods between activity and to increase oral intake as able.    Will route to provider for further recommendations.        "

## 2021-12-07 RX ORDER — PROCHLORPERAZINE MALEATE 10 MG
10 TABLET ORAL EVERY 6 HOURS PRN
Qty: 30 TABLET | Refills: 6 | Status: SHIPPED | OUTPATIENT
Start: 2021-12-07 | End: 2022-01-17

## 2021-12-07 NOTE — TELEPHONE ENCOUNTER
See Dosage recommendations.    Please refill as appropriate.  Thank you,    Helena Cabrera RN  [facility]

## 2021-12-15 ENCOUNTER — OFFICE VISIT (OUTPATIENT)
Dept: FAMILY MEDICINE | Facility: CLINIC | Age: 86
End: 2021-12-15
Payer: MEDICARE

## 2021-12-15 ENCOUNTER — ANCILLARY PROCEDURE (OUTPATIENT)
Dept: GENERAL RADIOLOGY | Facility: CLINIC | Age: 86
End: 2021-12-15
Attending: INTERNAL MEDICINE
Payer: MEDICARE

## 2021-12-15 VITALS
HEART RATE: 81 BPM | RESPIRATION RATE: 14 BRPM | TEMPERATURE: 98.2 F | HEIGHT: 62 IN | OXYGEN SATURATION: 96 % | DIASTOLIC BLOOD PRESSURE: 72 MMHG | BODY MASS INDEX: 25.4 KG/M2 | WEIGHT: 138 LBS | SYSTOLIC BLOOD PRESSURE: 120 MMHG

## 2021-12-15 DIAGNOSIS — R53.83 OTHER FATIGUE: ICD-10-CM

## 2021-12-15 DIAGNOSIS — R05.9 COUGH: Primary | ICD-10-CM

## 2021-12-15 DIAGNOSIS — M62.81 GENERALIZED MUSCLE WEAKNESS: ICD-10-CM

## 2021-12-15 DIAGNOSIS — E05.90: ICD-10-CM

## 2021-12-15 DIAGNOSIS — R05.9 COUGH: ICD-10-CM

## 2021-12-15 LAB
ALBUMIN UR-MCNC: NEGATIVE MG/DL
APPEARANCE UR: CLEAR
BACTERIA #/AREA URNS HPF: ABNORMAL /HPF
BASOPHILS # BLD AUTO: 0 10E3/UL (ref 0–0.2)
BASOPHILS NFR BLD AUTO: 1 %
BILIRUB UR QL STRIP: NEGATIVE
COLOR UR AUTO: YELLOW
EOSINOPHIL # BLD AUTO: 0.1 10E3/UL (ref 0–0.7)
EOSINOPHIL NFR BLD AUTO: 3 %
ERYTHROCYTE [DISTWIDTH] IN BLOOD BY AUTOMATED COUNT: 13.4 % (ref 10–15)
FLUAV AG SPEC QL IA: NEGATIVE
FLUBV AG SPEC QL IA: NEGATIVE
GLUCOSE UR STRIP-MCNC: NEGATIVE MG/DL
HCT VFR BLD AUTO: 35.4 % (ref 35–47)
HGB BLD-MCNC: 12 G/DL (ref 11.7–15.7)
HGB UR QL STRIP: ABNORMAL
KETONES UR STRIP-MCNC: NEGATIVE MG/DL
LEUKOCYTE ESTERASE UR QL STRIP: NEGATIVE
LYMPHOCYTES # BLD AUTO: 1.7 10E3/UL (ref 0.8–5.3)
LYMPHOCYTES NFR BLD AUTO: 45 %
MCH RBC QN AUTO: 32.1 PG (ref 26.5–33)
MCHC RBC AUTO-ENTMCNC: 33.9 G/DL (ref 31.5–36.5)
MCV RBC AUTO: 95 FL (ref 78–100)
MONOCYTES # BLD AUTO: 0.7 10E3/UL (ref 0–1.3)
MONOCYTES NFR BLD AUTO: 19 %
NEUTROPHILS # BLD AUTO: 1.2 10E3/UL (ref 1.6–8.3)
NEUTROPHILS NFR BLD AUTO: 33 %
NITRATE UR QL: NEGATIVE
PH UR STRIP: 5.5 [PH] (ref 5–7)
PLATELET # BLD AUTO: 163 10E3/UL (ref 150–450)
RBC # BLD AUTO: 3.74 10E6/UL (ref 3.8–5.2)
RBC #/AREA URNS AUTO: ABNORMAL /HPF
SP GR UR STRIP: >=1.03 (ref 1–1.03)
SQUAMOUS #/AREA URNS AUTO: ABNORMAL /LPF
UROBILINOGEN UR STRIP-ACNC: 0.2 E.U./DL
WBC # BLD AUTO: 3.7 10E3/UL (ref 4–11)
WBC #/AREA URNS AUTO: ABNORMAL /HPF

## 2021-12-15 PROCEDURE — U0005 INFEC AGEN DETEC AMPLI PROBE: HCPCS | Performed by: INTERNAL MEDICINE

## 2021-12-15 PROCEDURE — 71046 X-RAY EXAM CHEST 2 VIEWS: CPT | Performed by: RADIOLOGY

## 2021-12-15 PROCEDURE — 85025 COMPLETE CBC W/AUTO DIFF WBC: CPT | Performed by: INTERNAL MEDICINE

## 2021-12-15 PROCEDURE — 80053 COMPREHEN METABOLIC PANEL: CPT | Performed by: INTERNAL MEDICINE

## 2021-12-15 PROCEDURE — 36415 COLL VENOUS BLD VENIPUNCTURE: CPT | Performed by: INTERNAL MEDICINE

## 2021-12-15 PROCEDURE — 84443 ASSAY THYROID STIM HORMONE: CPT | Performed by: INTERNAL MEDICINE

## 2021-12-15 PROCEDURE — 81001 URINALYSIS AUTO W/SCOPE: CPT | Performed by: INTERNAL MEDICINE

## 2021-12-15 PROCEDURE — 87804 INFLUENZA ASSAY W/OPTIC: CPT | Performed by: INTERNAL MEDICINE

## 2021-12-15 PROCEDURE — U0003 INFECTIOUS AGENT DETECTION BY NUCLEIC ACID (DNA OR RNA); SEVERE ACUTE RESPIRATORY SYNDROME CORONAVIRUS 2 (SARS-COV-2) (CORONAVIRUS DISEASE [COVID-19]), AMPLIFIED PROBE TECHNIQUE, MAKING USE OF HIGH THROUGHPUT TECHNOLOGIES AS DESCRIBED BY CMS-2020-01-R: HCPCS | Performed by: INTERNAL MEDICINE

## 2021-12-15 PROCEDURE — 84439 ASSAY OF FREE THYROXINE: CPT | Performed by: INTERNAL MEDICINE

## 2021-12-15 PROCEDURE — 99214 OFFICE O/P EST MOD 30 MIN: CPT | Performed by: INTERNAL MEDICINE

## 2021-12-15 ASSESSMENT — MIFFLIN-ST. JEOR: SCORE: 1024.21

## 2021-12-15 NOTE — LETTER
December 17, 2021      Quiana Dunaway  [address] [facility] [address]        Dear ,    We are writing to inform you of your test results.    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  The lethargy and apathy that you are experiencing are the result of an excess of thyroid hormone.  The most common cause for an overactive thyroid gland is Graves' disease.  I suspect that you have Graves' disease.  Initially I stated, that I would like to check a thyroid scan to confirm this, but I did some reading and found out that we can make this diagnosis with a blood test and there is no need for an ultrasound or scan if the blood test is positive.     To get better control of your thyroid hormone levels, start the medication methimazole as discussed by phone.  We should recheck your thyroid hormone levels along with the blood test to confirm Graves disease in 1 month.  This could be combined with your routine lab draws to follow-up on your myeloma.     My hope is that after several weeks on this medication, your energy and interest levels will improve.     I will see you in January.         Resulted Orders   Influenza A/B antigen   Result Value Ref Range    Influenza A antigen Negative Negative    Influenza B antigen Negative Negative    Narrative    Test results must be correlated with clinical data. If necessary, results should be confirmed by a molecular assay or viral culture.   Symptomatic; Yes; 12/1/2021 COVID-19 Virus (Coronavirus) by PCR Nose   Result Value Ref Range    SARS CoV2 PCR Negative Negative, Testing sent to reference lab. Results will be returned via unsolicited result      Comment:      NEGATIVE: SARS-CoV-2 (COVID-19) RNA not detected, presumed negative.    Narrative    Testing was performed using the [employer] SARS-CoV-2 Assay on  the  [employer] System. Additional information about this  Emergency Use Authorization (EUA) assay can be found via the Lab  Guide. This test should be ordered for the detection of SARS-CoV-2 in  individuals who meet SARS-CoV-2 clinical and/or epidemiological  criteria. Test performance is unknown in asymptomatic patients. This  test is for in vitro diagnostic use under the FDA EUA for  laboratories certified under CLIA to perform high complexity testing.  This test has not been FDA cleared or approved. A negative result  does not rule out the presence of PCR inhibitors in the specimen or  target RNA in concentration below the limit of detection for the  assay. The possibility of a false negative should be considered if  the patient's recent exposure or clinical presentation suggests  COVID-19. This test was validated by the [facility]. This laboratory is certified under  the Clinical Laboratory Improvement Amendments of 1988 (CLIA-88) as  qualified to perform high complexity laboratory testing.   UA Macro with Reflex to Micro and Culture - lab collect   Result Value Ref Range    Color Urine Yellow Colorless, Straw, Light Yellow, Yellow    Appearance Urine Clear Clear    Glucose Urine Negative Negative mg/dL    Bilirubin Urine Negative Negative    Ketones Urine Negative Negative mg/dL    Specific Gravity Urine >=1.030 1.003 - 1.035    Blood Urine Trace (A) Negative    pH Urine 5.5 5.0 - 7.0    Protein Albumin Urine Negative Negative mg/dL    Urobilinogen Urine 0.2 0.2, 1.0 E.U./dL    Nitrite Urine Negative Negative    Leukocyte Esterase Urine Negative Negative   Comprehensive metabolic panel (BMP + Alb, Alk Phos, ALT, AST, Total. Bili, TP)   Result Value Ref Range    Sodium 138 133 - 144 mmol/L    Potassium 3.6 3.4 - 5.3 mmol/L    Chloride 104 94 - 109 mmol/L    Carbon Dioxide (CO2) 29 20 - 32 mmol/L    Anion Gap 5 3 - 14 mmol/L    Urea Nitrogen 20 7 - 30  mg/dL    Creatinine 0.75 0.52 - 1.04 mg/dL    Calcium 9.1 8.5 - 10.1 mg/dL    Glucose 101 (H) 70 - 99 mg/dL    Alkaline Phosphatase 60 40 - 150 U/L    AST 9 0 - 45 U/L    ALT 12 0 - 50 U/L    Protein Total 6.6 (L) 6.8 - 8.8 g/dL    Albumin 3.1 (L) 3.4 - 5.0 g/dL    Bilirubin Total 0.6 0.2 - 1.3 mg/dL    GFR Estimate 73 >60 mL/min/1.73m2      Comment:      As of July 11, 2021, eGFR is calculated by the CKD-EPI creatinine equation, without race adjustment. eGFR can be influenced by muscle mass, exercise, and diet. The reported eGFR is an estimation only and is only applicable if the renal function is stable.   TSH with free T4 reflex   Result Value Ref Range    TSH 0.03 (L) 0.40 - 4.00 mU/L   CBC with platelets and differential   Result Value Ref Range    WBC Count 3.7 (L) 4.0 - 11.0 10e3/uL    RBC Count 3.74 (L) 3.80 - 5.20 10e6/uL    Hemoglobin 12.0 11.7 - 15.7 g/dL    Hematocrit 35.4 35.0 - 47.0 %    MCV 95 78 - 100 fL    MCH 32.1 26.5 - 33.0 pg    MCHC 33.9 31.5 - 36.5 g/dL    RDW 13.4 10.0 - 15.0 %    Platelet Count 163 150 - 450 10e3/uL    % Neutrophils 33 %    % Lymphocytes 45 %    % Monocytes 19 %    % Eosinophils 3 %    % Basophils 1 %    Absolute Neutrophils 1.2 (L) 1.6 - 8.3 10e3/uL    Absolute Lymphocytes 1.7 0.8 - 5.3 10e3/uL    Absolute Monocytes 0.7 0.0 - 1.3 10e3/uL    Absolute Eosinophils 0.1 0.0 - 0.7 10e3/uL    Absolute Basophils 0.0 0.0 - 0.2 10e3/uL   Urine Microscopic   Result Value Ref Range    Bacteria Urine Few (A) None Seen /HPF    RBC Urine 0-2 0-2 /HPF /HPF    WBC Urine 0-5 0-5 /HPF /HPF    Squamous Epithelials Urine Few (A) None Seen /LPF    Narrative    Urine Culture not indicated   T4 free   Result Value Ref Range    Free T4 1.66 (H) 0.76 - 1.46 ng/dL       If you have any questions or concerns, please call the clinic at the number listed above.       Sincerely,      César Chung MD

## 2021-12-15 NOTE — LETTER
December 17, 2021      Quianaviri Dunaway  [address] [facility] [address]        Dear ,    We are writing to inform you of your test results.    The following letter pertains to your most recent diagnostic tests:     Good news! The chest x-ray is normal      Resulted Orders   XR Chest 2 Views    Narrative    XR CHEST 2 VW  12/15/2021 2:34 PM       INDICATION: Cough  COMPARISON: 6/10/2021       Impression    IMPRESSION: Dual-lead cardiac pacemaker is unchanged. The lungs and  pleural spaces are clear.    RACQUEL AGUILERA MD         SYSTEM ID:  [id number]       If you have any questions or concerns, please call the clinic at the number listed above.       Sincerely,      César Chung MD

## 2021-12-15 NOTE — PROGRESS NOTES
Assessment & Plan     Cough  Check for common viral causes of cough and lethargy as below as well as x-ray to exclude pneumonia  - XR Chest 2 Views; Future  - Influenza A/B antigen  - Symptomatic; Yes; 12/1/2021 COVID-19 Virus (Coronavirus) by PCR; Future  - Symptomatic; Yes; 12/1/2021 COVID-19 Virus (Coronavirus) by PCR    Other fatigue  Sometimes lethargy and fatigue is the symptom of UTI in older adults, will check for that although no localizing urinary symptoms; since symptoms came on after her most recent lab test with oncology, recheck labs including thyroid function test  - UA Macro with Reflex to Micro and Culture - lab collect; Future  - Comprehensive metabolic panel (BMP + Alb, Alk Phos, ALT, AST, Total. Bili, TP); Future  - CBC with platelets and differential; Future  - TSH with free T4 reflex; Future  - UA Macro with Reflex to Micro and Culture - lab collect  - Comprehensive metabolic panel (BMP + Alb, Alk Phos, ALT, AST, Total. Bili, TP)  - CBC with platelets and differential  - TSH with free T4 reflex  - Urine Microscopic    Generalized muscle weakness          21 minutes spent on the date of the encounter doing chart review, history and exam, documentation and further activities per the note         Return in about 1 month (around 1/15/2022) for recheck.   Patient instructed to return to clinic or contact us sooner if symptoms worsen or new symptoms develop or pending lab results    César Chung MD  [facility] MAGALY Araya is a 86 year old who presents for the following health issues  accompanied by her daughter.    HPI     Chief Complaint   Patient presents with     Generalized Weakness     x 2 weeks, loss of appetite, sleeping more - recent URI with cough.      86-year-old with multiple myeloma, anxiety, hypertension, osteopenia, pacemaker in place, chronic kidney disease, B12 deficiency, diastolic congestive heart failure presents with a 2-week history of  "generalized weakness, fatigue, loss of appetite, loss of motivation    Symptoms started shortly after her most recent lab test with her oncologist    At the beginning of symptom onset, she did describe a sore throat as well as a mild cough that seem to improve with cough drops    She was tested for strep according to her daughter-in-law and this was negative.  She also took a home Covid test which was negative.    She denies feeling depressed      Review of Systems   Constitutional, HEENT, cardiovascular, pulmonary, gi and gu systems are negative, except as otherwise noted.      Objective    /72 (BP Location: Left arm, Cuff Size: Adult Regular)   Pulse 81   Temp 98.2  F (36.8  C) (Temporal)   Resp 14   Ht 1.575 m (5' 2\")   Wt 62.6 kg (138 lb)   SpO2 96%   Breastfeeding No   BMI 25.24 kg/m    Body mass index is 25.24 kg/m .  Physical Exam   General: This is a frail elderly female who does not appear to look like herself.  Usually, she is quite spry and upbeat.  HEENT: There is mild conjunctival pallor, the mucous membranes are mildly dry, the nasal exam is normal, the posterior pharynx appears normal.  The neck is supple without adenopathy.  Cardiovascular: The heart has a regular rate and rhythm.  Pulmonary: The lungs are clear to auscultation bilaterally, breathing is not labored.  Abdomen: Soft, not distended, not tender, bowel sounds present, no masses, no organomegaly.  Extremities: Without significant edema, well perfused, no clubbing or cyanosis.  Skin: No obvious rash, she seems a little more pale than previous visits, normal skin turgor.  Neurological: Alert and oriented to person place and time, cranial nerves II to XII appear grossly intact, gait is unchanged.  Mental State: More flat affect than usual, well-groomed, normal speech.    CMP, CBC, UA, CXR, flu test, Covid test are pending, TSH is pending to            "

## 2021-12-16 ENCOUNTER — MYC MEDICAL ADVICE (OUTPATIENT)
Dept: FAMILY MEDICINE | Facility: CLINIC | Age: 86
End: 2021-12-16
Payer: MEDICARE

## 2021-12-16 LAB
ALBUMIN SERPL-MCNC: 3.1 G/DL (ref 3.4–5)
ALP SERPL-CCNC: 60 U/L (ref 40–150)
ALT SERPL W P-5'-P-CCNC: 12 U/L (ref 0–50)
ANION GAP SERPL CALCULATED.3IONS-SCNC: 5 MMOL/L (ref 3–14)
AST SERPL W P-5'-P-CCNC: 9 U/L (ref 0–45)
BILIRUB SERPL-MCNC: 0.6 MG/DL (ref 0.2–1.3)
BUN SERPL-MCNC: 20 MG/DL (ref 7–30)
CALCIUM SERPL-MCNC: 9.1 MG/DL (ref 8.5–10.1)
CHLORIDE BLD-SCNC: 104 MMOL/L (ref 94–109)
CO2 SERPL-SCNC: 29 MMOL/L (ref 20–32)
CREAT SERPL-MCNC: 0.75 MG/DL (ref 0.52–1.04)
GFR SERPL CREATININE-BSD FRML MDRD: 73 ML/MIN/1.73M2
GLUCOSE BLD-MCNC: 101 MG/DL (ref 70–99)
POTASSIUM BLD-SCNC: 3.6 MMOL/L (ref 3.4–5.3)
PROT SERPL-MCNC: 6.6 G/DL (ref 6.8–8.8)
SARS-COV-2 RNA RESP QL NAA+PROBE: NEGATIVE
SODIUM SERPL-SCNC: 138 MMOL/L (ref 133–144)
T4 FREE SERPL-MCNC: 1.66 NG/DL (ref 0.76–1.46)
TSH SERPL DL<=0.005 MIU/L-ACNC: 0.03 MU/L (ref 0.4–4)

## 2021-12-17 DIAGNOSIS — E05.90: Primary | ICD-10-CM

## 2021-12-17 RX ORDER — METHIMAZOLE 5 MG/1
5 TABLET ORAL DAILY
Qty: 90 TABLET | Refills: 3 | Status: SHIPPED | OUTPATIENT
Start: 2021-12-17 | End: 2022-03-09

## 2021-12-17 NOTE — RESULT ENCOUNTER NOTE
The following letter pertains to your most recent diagnostic tests:    Good news! The chest x-ray is normal.          Sincerely,    Dr. Chung

## 2021-12-17 NOTE — RESULT ENCOUNTER NOTE
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  The lethargy and apathy that you are experiencing are the result of an excess of thyroid hormone.  The most common cause for an overactive thyroid gland is Graves' disease.  I suspect that you have Graves' disease.  Initially I stated, that I would like to check a thyroid scan to confirm this, but I did some reading and found out that we can make this diagnosis with a blood test and there is no need for an ultrasound or scan if the blood test is positive.     To get better control of your thyroid hormone levels, start the medication methimazole as discussed by phone.  We should recheck your thyroid hormone levels along with the blood test to confirm Graves disease in 1 month.  This could be combined with your routine lab draws to follow-up on your myeloma.    My hope is that after several weeks on this medication, your energy and interest levels will improve.    I will see you in January.        Sincerely,    Dr. Chung

## 2021-12-20 ENCOUNTER — MYC MEDICAL ADVICE (OUTPATIENT)
Dept: FAMILY MEDICINE | Facility: CLINIC | Age: 86
End: 2021-12-20
Payer: MEDICARE

## 2021-12-20 DIAGNOSIS — C90.00 MULTIPLE MYELOMA NOT HAVING ACHIEVED REMISSION (H): Primary | ICD-10-CM

## 2021-12-20 NOTE — TELEPHONE ENCOUNTER
PCP see below     Pt asking for order for thyroid US    Please advise     Grace LIGHT, Triage RN  [facility]

## 2021-12-20 NOTE — TELEPHONE ENCOUNTER
Sorry for the confusion, if they read the result note, I change my mind about the ultrasound, we can confirm the presence of Graves' disease by doing a blood test when she returns for a follow-up thyroid hormone blood tests in about 1 month as per my result note

## 2021-12-21 RX ORDER — DEXAMETHASONE 4 MG/1
4 TABLET ORAL WEEKLY
Qty: 3 TABLET | Refills: 0 | Status: SHIPPED | OUTPATIENT
Start: 2021-12-21 | End: 2022-01-31

## 2021-12-21 RX ORDER — LENALIDOMIDE 10 MG/1
10 CAPSULE ORAL DAILY
Qty: 14 CAPSULE | Refills: 0 | Status: SHIPPED | OUTPATIENT
Start: 2021-12-21 | End: 2022-04-07

## 2021-12-22 ENCOUNTER — TELEPHONE (OUTPATIENT)
Dept: PHARMACY | Facility: CLINIC | Age: 86
End: 2021-12-22
Payer: MEDICARE

## 2021-12-27 ENCOUNTER — LAB (OUTPATIENT)
Dept: INFUSION THERAPY | Facility: CLINIC | Age: 86
End: 2021-12-27
Attending: INTERNAL MEDICINE
Payer: MEDICARE

## 2021-12-27 ENCOUNTER — ONCOLOGY VISIT (OUTPATIENT)
Dept: ONCOLOGY | Facility: CLINIC | Age: 86
End: 2021-12-27
Attending: INTERNAL MEDICINE
Payer: MEDICARE

## 2021-12-27 VITALS
WEIGHT: 136 LBS | HEART RATE: 92 BPM | RESPIRATION RATE: 16 BRPM | SYSTOLIC BLOOD PRESSURE: 149 MMHG | BODY MASS INDEX: 24.87 KG/M2 | OXYGEN SATURATION: 100 % | DIASTOLIC BLOOD PRESSURE: 70 MMHG

## 2021-12-27 DIAGNOSIS — R63.4 WEIGHT LOSS: ICD-10-CM

## 2021-12-27 DIAGNOSIS — C90.00 MULTIPLE MYELOMA NOT HAVING ACHIEVED REMISSION (H): Primary | ICD-10-CM

## 2021-12-27 DIAGNOSIS — R53.83 FATIGUE, UNSPECIFIED TYPE: ICD-10-CM

## 2021-12-27 LAB
ALBUMIN SERPL-MCNC: 3.6 G/DL (ref 3.4–5)
ALP SERPL-CCNC: 67 U/L (ref 40–150)
ALT SERPL W P-5'-P-CCNC: 19 U/L (ref 0–50)
ANION GAP SERPL CALCULATED.3IONS-SCNC: 10 MMOL/L (ref 3–14)
AST SERPL W P-5'-P-CCNC: 13 U/L (ref 0–45)
BASOPHILS # BLD AUTO: 0 10E3/UL (ref 0–0.2)
BASOPHILS NFR BLD AUTO: 1 %
BILIRUB SERPL-MCNC: 0.9 MG/DL (ref 0.2–1.3)
BUN SERPL-MCNC: 23 MG/DL (ref 7–30)
CALCIUM SERPL-MCNC: 9.7 MG/DL (ref 8.5–10.1)
CHLORIDE BLD-SCNC: 105 MMOL/L (ref 94–109)
CO2 SERPL-SCNC: 25 MMOL/L (ref 20–32)
CREAT SERPL-MCNC: 0.68 MG/DL (ref 0.52–1.04)
EOSINOPHIL # BLD AUTO: 0 10E3/UL (ref 0–0.7)
EOSINOPHIL NFR BLD AUTO: 0 %
ERYTHROCYTE [DISTWIDTH] IN BLOOD BY AUTOMATED COUNT: 13.4 % (ref 10–15)
GFR SERPL CREATININE-BSD FRML MDRD: 85 ML/MIN/1.73M2
GLUCOSE BLD-MCNC: 184 MG/DL (ref 70–99)
HCT VFR BLD AUTO: 37.6 % (ref 35–47)
HGB BLD-MCNC: 12.3 G/DL (ref 11.7–15.7)
IMM GRANULOCYTES # BLD: 0 10E3/UL
IMM GRANULOCYTES NFR BLD: 0 %
LYMPHOCYTES # BLD AUTO: 0.7 10E3/UL (ref 0.8–5.3)
LYMPHOCYTES NFR BLD AUTO: 19 %
MCH RBC QN AUTO: 30.8 PG (ref 26.5–33)
MCHC RBC AUTO-ENTMCNC: 32.7 G/DL (ref 31.5–36.5)
MCV RBC AUTO: 94 FL (ref 78–100)
MONOCYTES # BLD AUTO: 0.1 10E3/UL (ref 0–1.3)
MONOCYTES NFR BLD AUTO: 2 %
NEUTROPHILS # BLD AUTO: 2.8 10E3/UL (ref 1.6–8.3)
NEUTROPHILS NFR BLD AUTO: 78 %
NRBC # BLD AUTO: 0 10E3/UL
NRBC BLD AUTO-RTO: 0 /100
PLATELET # BLD AUTO: 158 10E3/UL (ref 150–450)
POTASSIUM BLD-SCNC: 3.9 MMOL/L (ref 3.4–5.3)
PROT SERPL-MCNC: 7.2 G/DL (ref 6.8–8.8)
RBC # BLD AUTO: 3.99 10E6/UL (ref 3.8–5.2)
SODIUM SERPL-SCNC: 140 MMOL/L (ref 133–144)
TOTAL PROTEIN SERUM FOR ELP: 6.9 G/DL (ref 6.8–8.8)
WBC # BLD AUTO: 3.6 10E3/UL (ref 4–11)

## 2021-12-27 PROCEDURE — 99215 OFFICE O/P EST HI 40 MIN: CPT | Performed by: INTERNAL MEDICINE

## 2021-12-27 PROCEDURE — 82784 ASSAY IGA/IGD/IGG/IGM EACH: CPT | Performed by: INTERNAL MEDICINE

## 2021-12-27 PROCEDURE — 84155 ASSAY OF PROTEIN SERUM: CPT | Performed by: INTERNAL MEDICINE

## 2021-12-27 PROCEDURE — 84165 PROTEIN E-PHORESIS SERUM: CPT | Mod: TC | Performed by: PATHOLOGY

## 2021-12-27 PROCEDURE — 80053 COMPREHEN METABOLIC PANEL: CPT | Performed by: INTERNAL MEDICINE

## 2021-12-27 PROCEDURE — 36415 COLL VENOUS BLD VENIPUNCTURE: CPT

## 2021-12-27 PROCEDURE — 83883 ASSAY NEPHELOMETRY NOT SPEC: CPT | Performed by: INTERNAL MEDICINE

## 2021-12-27 PROCEDURE — 85025 COMPLETE CBC W/AUTO DIFF WBC: CPT | Performed by: INTERNAL MEDICINE

## 2021-12-27 NOTE — LETTER
12/27/2021         RE: Quiana Dunaway  [address] [facility] [address]        Dear Colleague,    Thank you for referring your patient, Quiana Dunaway, to the [facility] [place]. Please see a copy of my visit note below.    SUBJECTIVE:  Ms. Dunaway is an 86-year-old female with multiple myeloma.  She is on Revlimid and dexamethasone.     For the last 3-4 weeks, she has not been feeling well.  She has been getting weaker.  Appetite has been decreased.  She does not have interest in doing anything.  She is not in any pain.  No recurrent nausea or vomiting.     The patient was seen by Dr. Chung.  Multiple labs were done.  Her TSH is very low and free T4 is high.  She is being treated for Graves' disease.     No headache.  Some dizziness.  No chest pain. No shortness of breath.  No abdominal pain.  No urinary or bowel complaints. She has not had any stroke-like symptoms.  All other review of systems is negative.     PHYSICAL EXAMINATION:   GENERAL:  Alert and oriented x 3.   VITAL SIGNS:  Reviewed.  ECOG PS of 2.     EYES:  No icterus.   THROAT:  No ulcer. No thrush.   NECK:  Supple. No lymphadenopathy. No thyromegaly.   AXILLAE:  No lymphadenopathy.   LUNGS:  Good air entry bilaterally.  No crackles or wheezing.   HEART:  Regular.  No murmur.   GI: Abdomen is soft.  Nontender. No mass. Difficult palpation because of her weight.  EXTREMITIES:  No pedal edema.  No calf swelling or tenderness.   SKIN:  No rash.     LABORATORY:  CBC, SPEP, IgG, free light chain and CMP were reviewed.     ASSESSMENT:     1.  An 86-year-old female with multiple myeloma on Revlimid and dexamethasone.  2.  Graves' disease.  3.  Multiple symptoms including weakness, decreased appetite and anhedonia.     PLAN:     1.  Discussed regarding myeloma.  She has been on Revlimid and dexamethasone.  She has been tolerating it well and disease overall has been stable.     The patient is now having different symptoms.   Her symptoms could be from Graves' disease but could also be from side-effects of revlimid, her age or another malignancy.     We will hold the Revlimid.  She will continue weekly dexamethasone.     2.  Discussed regarding getting CT chest, abdomen and pelvis because of her symptoms of weakness, decreased appetite, which were all suggestive of malignancy.  She is agreeable for it.  We will get a CT scan.    3.  She will continue on methimazole for Graves' disease.    4.  Labs were reviewed.  WBC is mildly low, but ANC is normal.  We will continue to monitor it.    5.  We will hold the Zometa.    6.  I will see her after the CT scan.      This office note has been dictated.        Again, thank you for allowing me to participate in the care of your patient.        Sincerely,        Alex Parry MD

## 2021-12-27 NOTE — TELEPHONE ENCOUNTER
Oral Chemotherapy Monitoring Program   Medication: Revlimid  Rx: 10mg PO daily on days 1 through 14 of 21 day cycle   Auth #: [id number]  Obtained on: 12/22/2021  Risk Category: adult female of non child bearing potential  Routine survey questions reviewed.   Rx to be Escribed to [facility]     Terrie Bowen [facility] Liaison  [phone]

## 2021-12-27 NOTE — PATIENT INSTRUCTIONS
1. Hold revlimid.  -Continue weekly dexamethasone.  2. Hold zometa.  3. CT scan in next few days.  4. Follow-up after CT scan.

## 2021-12-28 LAB
IGG SERPL-MCNC: 931 MG/DL (ref 610–1616)
KAPPA LC FREE SER-MCNC: 29.13 MG/DL (ref 0.33–1.94)
KAPPA LC FREE/LAMBDA FREE SER NEPH: 37.83 {RATIO} (ref 0.26–1.65)
LAMBDA LC FREE SERPL-MCNC: 0.77 MG/DL (ref 0.57–2.63)

## 2021-12-29 ENCOUNTER — TELEPHONE (OUTPATIENT)
Dept: ONCOLOGY | Facility: CLINIC | Age: 86
End: 2021-12-29
Payer: MEDICARE

## 2021-12-29 LAB
ALBUMIN SERPL ELPH-MCNC: 4.1 G/DL (ref 3.7–5.1)
ALPHA1 GLOB SERPL ELPH-MCNC: 0.4 G/DL (ref 0.2–0.4)
ALPHA2 GLOB SERPL ELPH-MCNC: 0.8 G/DL (ref 0.5–0.9)
B-GLOBULIN SERPL ELPH-MCNC: 0.7 G/DL (ref 0.6–1)
GAMMA GLOB SERPL ELPH-MCNC: 0.9 G/DL (ref 0.7–1.6)
M PROTEIN SERPL ELPH-MCNC: 0.6 G/DL
PROT PATTERN SERPL ELPH-IMP: ABNORMAL

## 2021-12-29 PROCEDURE — 84165 PROTEIN E-PHORESIS SERUM: CPT | Mod: 26 | Performed by: PATHOLOGY

## 2021-12-29 NOTE — TELEPHONE ENCOUNTER
Order has been placed and patient has been scheduled for CT scan and follow up .    Alecia Briggs RN

## 2021-12-30 ENCOUNTER — HOSPITAL ENCOUNTER (OUTPATIENT)
Dept: CT IMAGING | Facility: CLINIC | Age: 86
Discharge: HOME | End: 2021-12-30
Attending: INTERNAL MEDICINE | Admitting: INTERNAL MEDICINE
Payer: MEDICARE

## 2021-12-30 DIAGNOSIS — C90.00 MULTIPLE MYELOMA NOT HAVING ACHIEVED REMISSION (H): ICD-10-CM

## 2021-12-30 DIAGNOSIS — R63.4 WEIGHT LOSS: ICD-10-CM

## 2021-12-30 DIAGNOSIS — R53.83 FATIGUE, UNSPECIFIED TYPE: ICD-10-CM

## 2021-12-30 PROCEDURE — 74177 CT ABD & PELVIS W/CONTRAST: CPT | Mod: MG

## 2021-12-30 PROCEDURE — 250N000011 HC RX IP 250 OP 636: Performed by: INTERNAL MEDICINE

## 2021-12-30 PROCEDURE — 250N000009 HC RX 250: Performed by: INTERNAL MEDICINE

## 2021-12-30 RX ORDER — IOPAMIDOL 755 MG/ML
67 INJECTION, SOLUTION INTRAVASCULAR ONCE
Status: COMPLETED | OUTPATIENT
Start: 2021-12-30 | End: 2021-12-30

## 2021-12-30 RX ADMIN — SODIUM CHLORIDE 59 ML: 9 INJECTION, SOLUTION INTRAVENOUS at 15:15

## 2021-12-30 RX ADMIN — IOPAMIDOL 67 ML: 755 INJECTION, SOLUTION INTRAVENOUS at 15:15

## 2021-12-31 NOTE — RESULT ENCOUNTER NOTE
Dear Ms. Dunaway,    CT scan is good. No cancer or infection seen.    Please, call me with any questions.    Alex Parry MD

## 2022-01-02 NOTE — PROGRESS NOTES
SUBJECTIVE:  Ms. Dunaway is an 86-year-old female with multiple myeloma.  She is on Revlimid and dexamethasone.     For the last 3-4 weeks, she has not been feeling well.  She has been getting weaker.  Appetite has been decreased.  She does not have interest in doing anything.  She is not in any pain.  No recurrent nausea or vomiting.     The patient was seen by Dr. Chung.  Multiple labs were done.  Her TSH is very low and free T4 is high.  She is being treated for Graves' disease.     No headache.  Some dizziness.  No chest pain. No shortness of breath.  No abdominal pain.  No urinary or bowel complaints. She has not had any stroke-like symptoms.  All other review of systems is negative.     PHYSICAL EXAMINATION:   GENERAL:  Alert and oriented x 3.   VITAL SIGNS:  Reviewed.  ECOG PS of 2.     EYES:  No icterus.   THROAT:  No ulcer. No thrush.   NECK:  Supple. No lymphadenopathy. No thyromegaly.   AXILLAE:  No lymphadenopathy.   LUNGS:  Good air entry bilaterally.  No crackles or wheezing.   HEART:  Regular.  No murmur.   GI: Abdomen is soft.  Nontender. No mass. Difficult palpation because of her weight.  EXTREMITIES:  No pedal edema.  No calf swelling or tenderness.   SKIN:  No rash.     LABORATORY:  CBC, SPEP, IgG, free light chain and CMP were reviewed.     ASSESSMENT:     1.  An 86-year-old female with multiple myeloma on Revlimid and dexamethasone.  2.  Graves' disease.  3.  Multiple symptoms including weakness, decreased appetite and anhedonia.     PLAN:     1.  Discussed regarding myeloma.  She has been on Revlimid and dexamethasone.  She has been tolerating it well and disease overall has been stable.     The patient is now having different symptoms.  Her symptoms could be from Graves' disease but could also be from side-effects of revlimid, her age or another malignancy.     We will hold the Revlimid.  She will continue weekly dexamethasone.     2.  Discussed regarding getting CT chest, abdomen and  pelvis because of her symptoms of weakness, decreased appetite, which were all suggestive of malignancy.  She is agreeable for it.  We will get a CT scan.    3.  She will continue on methimazole for Graves' disease.    4.  Labs were reviewed.  WBC is mildly low, but ANC is normal.  We will continue to monitor it.    5.  We will hold the Zometa.    6.  I will see her after the CT scan.

## 2022-01-03 ENCOUNTER — VIRTUAL VISIT (OUTPATIENT)
Dept: ONCOLOGY | Facility: CLINIC | Age: 87
End: 2022-01-03
Attending: INTERNAL MEDICINE
Payer: MEDICARE

## 2022-01-03 DIAGNOSIS — C90.00 MULTIPLE MYELOMA NOT HAVING ACHIEVED REMISSION (H): ICD-10-CM

## 2022-01-03 DIAGNOSIS — E05.90 HYPERTHYROIDISM: ICD-10-CM

## 2022-01-03 DIAGNOSIS — C90.00 MULTIPLE MYELOMA NOT HAVING ACHIEVED REMISSION (H): Primary | ICD-10-CM

## 2022-01-03 PROCEDURE — 99213 OFFICE O/P EST LOW 20 MIN: CPT | Mod: 95 | Performed by: INTERNAL MEDICINE

## 2022-01-03 NOTE — PATIENT INSTRUCTIONS
1. Hold revlimid for 1 month.  -Continue weekly dexamethasone.  2. Hold zometa.  3. Continue methimezole.  4. Follow-up in 1 month with labs.

## 2022-01-03 NOTE — PROGRESS NOTES
Quiana is a 86 year old who is being evaluated via a billable video visit.      How would you like to obtain your AVS? CallApphart  If the video visit is dropped, the invitation should be resent by: Text to cell phone: [phone]  Will anyone else be joining your video visit? Yes, eusebio.       Zoila Norris

## 2022-01-03 NOTE — LETTER
1/3/2022         RE: Quiana Dunaway  [address] [facility] [address]        Dear Colleague,    Thank you for referring your patient, Quiana Dunaway, to the [facility] [place]. Please see a copy of my visit note below.    Quiana is a 86 year old who is being evaluated via a billable video visit.      How would you like to obtain your AVS? MyChart  If the video visit is dropped, the invitation should be resent by: Text to cell phone: [phone]  Will anyone else be joining your video visit? Yes, kandis Norris      HEMATOLOGY HISTORY: Mrs. Dunaway is a lady with multiple myeloma (IgG kappa). High risk, t(14;16).  1. On 05/01/2017, WBC of 3.4, hemoglobin of 10.2 and platelet of 154.  2. SPEP on 07/07/2017 revealed M-spike of 1.8.  3. Bone marrow biopsy on 07/12/2017 reveals kappa monotypic plasma cell population consistent with multiple myeloma.  Bone marrow is 70% cellular.  Plasma cell is 50-60%.     -There is gain of chromosome 1, 5, 9, 15, and 17.  There is translocation 14;16.   4.  On 07/18/2017:  -M-spike of 1.9.   -Immunofixation reveals monoclonal IgG kappa.    -IgG level of 2970.  IgA of 15 and IgM of 175.    -Kappa free light chain of 67.7.  Lambda free light chain of 1.42.  Ratio of kappa to lambda of 47.71.    -Beta 2 microglobulin of 3.4.   5. PET scan on 07/24/2017 reveals several focal areas of increased FDG uptake consistent with multiple myeloma.  There is probable epithelial cyst in tail of the pancreas.  No associated abnormal FDG activity.  Left thyroid cysts or nodules without any FDG activity.  There is a 0.3 cm left upper lobe lung nodule.   6.  Velcade, Revlimid and dexamethasone between on 08/14/2017 and 04/30/2018. Velcade stopped.   -Revlimid and dexamethasone continued.  7. CT chest, abdomen, and pelvis on 12/30/2021 does not reveal any malignancy.    SUBJECTIVE:  Ms. Dunaway is an 86-year-old female who has not been feeling well for the  last few weeks.  I had seen her about a week ago.  We are holding the Revlimid.  Multiple investigations were ordered.  -CT chest, abdomen, and pelvis on 12/30/2021 does not reveal any malignancy.  No infection.  -Labs on 12/27/2021 reveal a stable monoclonal peak and kappa free light chain.     The patient says that she is still weak, but the weakness is slightly less.  Not in pain.  No nausea or vomiting.  Appetite is fair.  No bleeding.  No fever or chills.  All other review of systems are negative.     PHYSICAL EXAMINATION:    She is alert and oriented x 3.  Not in any distress.   The rest of the systems were not examined as this is a video visit.     LABORATORY:  Reviewed.     ASSESSMENT:    1.  An 86-year-old female with multiple myeloma, which is stable.  2.  Weakness, decreased appetite and anhedonia.  3.  Graves' disease.     PLAN:    1.  CT scan was reviewed with the patient and her family.  I explained to them there is no evidence of any malignancy or infection.  They were happy to know that.    Labs were all reviewed.  Myeloma is stable.    2.  I explained to the patient that her symptoms could be due to Graves' disease and also side effects of Revlimid.    We will hold the Revlimid for another 1 month.  She will continue on weekly dexamethasone.  Once she is better we will resume Revlimid.    3.  She will continue on methimazole for Graves' disease.  She will also follow up with Dr. Chung.    4.  She and her family had a few questions, which were all answered.  I will see her in a month.     TOTAL VIDEO VISIT TIME:  Fifteen minutes.  Time was spent in today's visit, review of chart/investigations today and documentation.    This office note has been dictated.          Again, thank you for allowing me to participate in the care of your patient.        Sincerely,        Alex Parry MD

## 2022-01-03 NOTE — LETTER
1/3/2022         RE: Quiana Dunaway  [address] [facility] [address]        Dear Colleague,    Thank you for referring your patient, Quiana Dunaway, to the [facility] [place]. Please see a copy of my visit note below.    Quiana is a 86 year old who is being evaluated via a billable video visit.      How would you like to obtain your AVS? MyChart  If the video visit is dropped, the invitation should be resent by: Text to cell phone: [phone]  Will anyone else be joining your video visit? Yes, kandis Norris      HEMATOLOGY HISTORY: Mrs. Dunaway is a lady with multiple myeloma (IgG kappa). High risk, t(14;16).  1. On 05/01/2017, WBC of 3.4, hemoglobin of 10.2 and platelet of 154.  2. SPEP on 07/07/2017 revealed M-spike of 1.8.  3. Bone marrow biopsy on 07/12/2017 reveals kappa monotypic plasma cell population consistent with multiple myeloma.  Bone marrow is 70% cellular.  Plasma cell is 50-60%.     -There is gain of chromosome 1, 5, 9, 15, and 17.  There is translocation 14;16.   4.  On 07/18/2017:  -M-spike of 1.9.   -Immunofixation reveals monoclonal IgG kappa.    -IgG level of 2970.  IgA of 15 and IgM of 175.    -Kappa free light chain of 67.7.  Lambda free light chain of 1.42.  Ratio of kappa to lambda of 47.71.    -Beta 2 microglobulin of 3.4.   5. PET scan on 07/24/2017 reveals several focal areas of increased FDG uptake consistent with multiple myeloma.  There is probable epithelial cyst in tail of the pancreas.  No associated abnormal FDG activity.  Left thyroid cysts or nodules without any FDG activity.  There is a 0.3 cm left upper lobe lung nodule.   6.  Velcade, Revlimid and dexamethasone between on 08/14/2017 and 04/30/2018. Velcade stopped.   -Revlimid and dexamethasone continued.  7. CT chest, abdomen, and pelvis on 12/30/2021 does not reveal any malignancy.    SUBJECTIVE:  Ms. Dunaway is an 86-year-old female who has not been feeling well for the  last few weeks.  I had seen her about a week ago.  We are holding the Revlimid.  Multiple investigations were ordered.  -CT chest, abdomen, and pelvis on 12/30/2021 does not reveal any malignancy.  No infection.  -Labs on 12/27/2021 reveal a stable monoclonal peak and kappa free light chain.     The patient says that she is still weak, but the weakness is slightly less.  Not in pain.  No nausea or vomiting.  Appetite is fair.  No bleeding.  No fever or chills.  All other review of systems are negative.     PHYSICAL EXAMINATION:    She is alert and oriented x 3.  Not in any distress.   The rest of the systems were not examined as this is a video visit.     LABORATORY:  Reviewed.     ASSESSMENT:    1.  An 86-year-old female with multiple myeloma, which is stable.  2.  Weakness, decreased appetite and anhedonia.  3.  Graves' disease.     PLAN:    1.  CT scan was reviewed with the patient and her family.  I explained to them there is no evidence of any malignancy or infection.  They were happy to know that.    Labs were all reviewed.  Myeloma is stable.    2.  I explained to the patient that her symptoms could be due to Graves' disease and also side effects of Revlimid.    We will hold the Revlimid for another 1 month.  She will continue on weekly dexamethasone.  Once she is better we will resume Revlimid.    3.  She will continue on methimazole for Graves' disease.  She will also follow up with Dr. Chung.    4.  She and her family had a few questions, which were all answered.  I will see her in a month.     TOTAL VIDEO VISIT TIME:  Fifteen minutes.  Time was spent in today's visit, review of chart/investigations today and documentation.    This office note has been dictated.          Again, thank you for allowing me to participate in the care of your patient.        Sincerely,        Alex Parry MD

## 2022-01-04 RX ORDER — LORAZEPAM 0.5 MG/1
0.5 TABLET ORAL EVERY 6 HOURS PRN
Qty: 30 TABLET | Refills: 3 | Status: SHIPPED | OUTPATIENT
Start: 2022-01-04 | End: 2022-04-26

## 2022-01-10 NOTE — PROGRESS NOTES
HEMATOLOGY HISTORY: Mrs. Dunaway is a lady with multiple myeloma (IgG kappa). High risk, t(14;16).  1. On 05/01/2017, WBC of 3.4, hemoglobin of 10.2 and platelet of 154.  2. SPEP on 07/07/2017 revealed M-spike of 1.8.  3. Bone marrow biopsy on 07/12/2017 reveals kappa monotypic plasma cell population consistent with multiple myeloma.  Bone marrow is 70% cellular.  Plasma cell is 50-60%.     -There is gain of chromosome 1, 5, 9, 15, and 17.  There is translocation 14;16.   4.  On 07/18/2017:  -M-spike of 1.9.   -Immunofixation reveals monoclonal IgG kappa.    -IgG level of 2970.  IgA of 15 and IgM of 175.    -Kappa free light chain of 67.7.  Lambda free light chain of 1.42.  Ratio of kappa to lambda of 47.71.    -Beta 2 microglobulin of 3.4.   5. PET scan on 07/24/2017 reveals several focal areas of increased FDG uptake consistent with multiple myeloma.  There is probable epithelial cyst in tail of the pancreas.  No associated abnormal FDG activity.  Left thyroid cysts or nodules without any FDG activity.  There is a 0.3 cm left upper lobe lung nodule.   6.  Velcade, Revlimid and dexamethasone between on 08/14/2017 and 04/30/2018. Velcade stopped.   -Revlimid and dexamethasone continued.  7. CT chest, abdomen, and pelvis on 12/30/2021 does not reveal any malignancy.    SUBJECTIVE:  Ms. Dunaway is an 86-year-old female who has not been feeling well for the last few weeks.  I had seen her about a week ago.  We are holding the Revlimid.  Multiple investigations were ordered.  -CT chest, abdomen, and pelvis on 12/30/2021 does not reveal any malignancy.  No infection.  -Labs on 12/27/2021 reveal a stable monoclonal peak and kappa free light chain.     The patient says that she is still weak, but the weakness is slightly less.  Not in pain.  No nausea or vomiting.  Appetite is fair.  No bleeding.  No fever or chills.  All other review of systems are negative.     PHYSICAL EXAMINATION:    She is alert and oriented x 3.   Not in any distress.   The rest of the systems were not examined as this is a video visit.     LABORATORY:  Reviewed.     ASSESSMENT:    1.  An 86-year-old female with multiple myeloma, which is stable.  2.  Weakness, decreased appetite and anhedonia.  3.  Graves' disease.     PLAN:    1.  CT scan was reviewed with the patient and her family.  I explained to them there is no evidence of any malignancy or infection.  They were happy to know that.    Labs were all reviewed.  Myeloma is stable.    2.  I explained to the patient that her symptoms could be due to Graves' disease and also side effects of Revlimid.    We will hold the Revlimid for another 1 month.  She will continue on weekly dexamethasone.  Once she is better we will resume Revlimid.    3.  She will continue on methimazole for Graves' disease.  She will also follow up with Dr. Chung.    4.  She and her family had a few questions, which were all answered.  I will see her in a month.     TOTAL VIDEO VISIT TIME:  Fifteen minutes.  Time was spent in today's visit, review of chart/investigations today and documentation.

## 2022-01-17 ENCOUNTER — OFFICE VISIT (OUTPATIENT)
Dept: FAMILY MEDICINE | Facility: CLINIC | Age: 87
End: 2022-01-17
Payer: MEDICARE

## 2022-01-17 VITALS
RESPIRATION RATE: 16 BRPM | SYSTOLIC BLOOD PRESSURE: 149 MMHG | OXYGEN SATURATION: 96 % | DIASTOLIC BLOOD PRESSURE: 81 MMHG | WEIGHT: 138 LBS | BODY MASS INDEX: 25.4 KG/M2 | HEIGHT: 62 IN | HEART RATE: 95 BPM | TEMPERATURE: 97 F

## 2022-01-17 DIAGNOSIS — R11.0 NAUSEA: ICD-10-CM

## 2022-01-17 DIAGNOSIS — E05.00 GRAVES DISEASE: Primary | ICD-10-CM

## 2022-01-17 DIAGNOSIS — E04.2 NON-TOXIC MULTINODULAR GOITER: ICD-10-CM

## 2022-01-17 PROCEDURE — 36416 COLLJ CAPILLARY BLOOD SPEC: CPT | Performed by: INTERNAL MEDICINE

## 2022-01-17 PROCEDURE — 99214 OFFICE O/P EST MOD 30 MIN: CPT | Performed by: INTERNAL MEDICINE

## 2022-01-17 PROCEDURE — 99000 SPECIMEN HANDLING OFFICE-LAB: CPT | Performed by: INTERNAL MEDICINE

## 2022-01-17 PROCEDURE — 84443 ASSAY THYROID STIM HORMONE: CPT | Performed by: INTERNAL MEDICINE

## 2022-01-17 PROCEDURE — 84445 ASSAY OF TSI GLOBULIN: CPT | Mod: 90 | Performed by: INTERNAL MEDICINE

## 2022-01-17 PROCEDURE — 84439 ASSAY OF FREE THYROXINE: CPT | Performed by: INTERNAL MEDICINE

## 2022-01-17 RX ORDER — FAMOTIDINE 40 MG/1
40 TABLET, FILM COATED ORAL 2 TIMES DAILY
Qty: 180 TABLET | Refills: 3 | Status: SHIPPED | OUTPATIENT
Start: 2022-01-17 | End: 2022-03-01

## 2022-01-17 RX ORDER — ONDANSETRON 4 MG/1
4 TABLET, FILM COATED ORAL EVERY 8 HOURS PRN
Qty: 30 TABLET | Refills: 11 | Status: SHIPPED | OUTPATIENT
Start: 2022-01-17 | End: 2022-03-01

## 2022-01-17 ASSESSMENT — PAIN SCALES - GENERAL: PAINLEVEL: NO PAIN (0)

## 2022-01-17 ASSESSMENT — MIFFLIN-ST. JEOR: SCORE: 1024.21

## 2022-01-17 NOTE — PATIENT INSTRUCTIONS
Compazine is not helping your nausea and it can contribute to tiredness.  Stop compazine.  Start taking the acid-blocker famotidine (Pepcid) twice daily as directed to help with nausea.  Use ondansetron (Zofran) IF NEEDED for nausea not controlled by famotidine.      Graves disease can affect your eyes, so see your eye doctor as soon as you can for an eye exam and tell them that Dr. Chung suspect Graves disease.       We will recheck the thyroid levels today and adjust methimazole accordingly to get the thyroid hormone levels just right.  This will help your energy levels.      CUT BACK ON DIET COKE SO YOU CAN SLEEP THROUGH THE NIGHT!

## 2022-01-17 NOTE — LETTER
[address] [facility]  [address]  [place]  [address]  Tel: [phone]    January 21, 2022    Quiana ANA Emelyn  [address] [facility] [address]        Dear Ms. Dunaway,    The following letter pertains to your most recent diagnostic tests:     The good news is that your thyroid hormone levels are now normal.  This means the methimazole medication that you are taking at its current dose is working to regulate your thyroid hormone levels.  There can be some lag time between normalizing the thyroid hormone levels and improvement of symptoms.  Keep taking the methimazole as your are.       The other news is that the blood test to evaluate for the most common cause of an overactive thyroid gland (Graves' disease) (Thyroid Stimulating Immunoglobulin TSI) returned negative.  This does not rule out Graves' disease, but obliges us to explore for other potential causes for an overactive thyroid gland (hyperthyroidism).     As such, I recommend that we schedule an ultrasound of the thyroid at your convenience to evaluate further the cause for the overactive thyroid gland.  Please call [facility] at [phone] to schedule your thyroid ultrasound.       Sincerely,     Dr. Chung/RASHID           Enclosure: Lab Results  Results for orders placed or performed in visit on 01/17/22   TSH     Status: Normal   Result Value Ref Range    TSH 2.48 0.40 - 4.00 mU/L   T4, free     Status: Normal   Result Value Ref Range    Free T4 0.80 0.76 - 1.46 ng/dL   Thyroid stimulating immunoglobulin     Status: None   Result Value Ref Range    Thyroid Stim Immunog <1.0 <=1.3 TSI index

## 2022-01-17 NOTE — PROGRESS NOTES
"  Assessment & Plan     Graves disease    - TSH; Future  - T4, free; Future  - Thyroid stimulating immunoglobulin; Future    Nausea    - famotidine (PEPCID) 40 MG tablet; Take 1 tablet (40 mg) by mouth 2 times daily  - ondansetron (ZOFRAN) 4 MG tablet; Take 1 tablet (4 mg) by mouth every 8 hours as needed for nausea      30 minutes spent on the date of the encounter doing chart review, history and exam, documentation and further activities per the note       BMI:   Estimated body mass index is 25.24 kg/m  as calculated from the following:    Height as of this encounter: 1.575 m (5' 2\").    Weight as of this encounter: 62.6 kg (138 lb).       Patient Instructions   Compazine is not helping your nausea and it can contribute to tiredness.  Stop compazine.  Start taking the acid-blocker famotidine (Pepcid) twice daily as directed to help with nausea.  Use ondansetron (Zofran) IF NEEDED for nausea not controlled by famotidine.      Graves disease can affect your eyes, so see your eye doctor as soon as you can for an eye exam and tell them that Dr. Chung suspect Graves disease.       We will recheck the thyroid levels today and adjust methimazole accordingly to get the thyroid hormone levels just right.  This will help your energy levels.      CUT BACK ON DIET COKE SO YOU CAN SLEEP THROUGH THE NIGHT!      Return in about 1 month (around 2/17/2022) for recheck Grave's disease .    César Chung MD  [facility] MAGALY Araya is a 86 year old who presents for the following health issues  accompanied by her Daughter-in-law.    HPI     Patient complains of feeling nauseated, taking Compazine around-the-clock, no vomiting, weight has stabilized, daughter-in-law states that she might feel little bit better since starting the methimazole, she has back at work, Dr. Parry did a CT of the chest abdomen pelvis and it was negative.  Labs were stable when he checked at the end of December.  No blood in " the stools, no melena.  No dysphagia or odynophagia.  She states that it is getting harder for her eyes to focus and that her eyes water frequently.    Review of Systems         Objective    There were no vitals taken for this visit.  There is no height or weight on file to calculate BMI.  Physical Exam   General: This is an improved appearing elderly female in no acute distress.  HEENT: No obvious proptosis or lid lag.  Neurological: No obvious tremulousness.

## 2022-01-17 NOTE — LETTER
January 21, 2022      Quianaviri Dunaway  [address] [facility] [address]        Dear ,    We are writing to inform you of your test results.    {results letter list:497504}    Resulted Orders   TSH   Result Value Ref Range    TSH 2.48 0.40 - 4.00 mU/L   T4, free   Result Value Ref Range    Free T4 0.80 0.76 - 1.46 ng/dL   Thyroid stimulating immunoglobulin   Result Value Ref Range    Thyroid Stim Immunog <1.0 <=1.3 TSI index      Comment:         Test Performed by:  [place] [address]  : Ho Adame M.D. Ph.D.; CLIA# [id number]       If you have any questions or concerns, please call the clinic at the number listed above.       Sincerely,      César Chung MD

## 2022-01-18 LAB
T4 FREE SERPL-MCNC: 0.8 NG/DL (ref 0.76–1.46)
TSH SERPL DL<=0.005 MIU/L-ACNC: 2.48 MU/L (ref 0.4–4)

## 2022-01-20 LAB — TSI SER-ACNC: <1 TSI INDEX

## 2022-01-21 NOTE — RESULT ENCOUNTER NOTE
The following letter pertains to your most recent diagnostic tests:    The good news is that your thyroid hormone levels are now normal.  This means the methimazole medication that you are taking at its current dose is working to regulate your thyroid hormone levels.  There can be some lag time between normalizing the thyroid hormone levels and improvement of symptoms.  Keep taking the methimazole as your are.      The other news is that the blood test to evaluate for the most common cause of an overactive thyroid gland (Graves' disease) (Thyroid Stimulating Immunoglobulin TSI) returned negative.  This does not rule out Graves' disease, but obliges us to explore for other potential causes for an overactive thyroid gland (hyperthyroidism).    As such, I recommend that we schedule an ultrasound of the thyroid at your convenience to evaluate further the cause for the overactive thyroid gland.  Please call [facility] at [phone] to schedule your thyroid ultrasound.       Sincerely,    Dr. Chung

## 2022-01-28 ENCOUNTER — MYC MEDICAL ADVICE (OUTPATIENT)
Dept: ONCOLOGY | Facility: CLINIC | Age: 87
End: 2022-01-28
Payer: MEDICARE

## 2022-01-28 NOTE — TELEPHONE ENCOUNTER
Made outreach call to patient to inform of [employer] product availability.     Unable to reach patient, [employer] message sent.     Flora Singleton RN on 1/28/2022 at 12:23 PM

## 2022-01-31 ENCOUNTER — TELEPHONE (OUTPATIENT)
Dept: CARDIOLOGY | Facility: CLINIC | Age: 87
End: 2022-01-31
Payer: MEDICARE

## 2022-01-31 ENCOUNTER — PATIENT OUTREACH (OUTPATIENT)
Dept: ONCOLOGY | Facility: CLINIC | Age: 87
End: 2022-01-31
Payer: MEDICARE

## 2022-01-31 DIAGNOSIS — I49.5 SICK SINUS SYNDROME (H): Primary | ICD-10-CM

## 2022-01-31 DIAGNOSIS — C90.00 MULTIPLE MYELOMA NOT HAVING ACHIEVED REMISSION (H): ICD-10-CM

## 2022-01-31 DIAGNOSIS — Z95.0 CARDIAC PACEMAKER IN SITU: ICD-10-CM

## 2022-01-31 RX ORDER — DEXAMETHASONE 4 MG/1
4 TABLET ORAL WEEKLY
Qty: 3 TABLET | Refills: 0 | Status: SHIPPED | OUTPATIENT
Start: 2022-01-31 | End: 2022-02-24

## 2022-01-31 NOTE — TELEPHONE ENCOUNTER
Received call from call center stating that they were trying to set up annual follow-up and device check for patient and orders were needed.  Orders entered for annual follow-up with Dr. Worthington and device check.      FAUSTINO Abel

## 2022-01-31 NOTE — PROGRESS NOTES
Writer received a message from patient's daughter in law requesting a new script of Dexamethasone to be [facility] to  [facility].    New script e-prescribed to pharmacy.    Alecia Briggs RN

## 2022-02-01 ENCOUNTER — LAB (OUTPATIENT)
Dept: INFUSION THERAPY | Facility: CLINIC | Age: 87
End: 2022-02-01
Attending: INTERNAL MEDICINE
Payer: MEDICARE

## 2022-02-01 DIAGNOSIS — E05.90 HYPERTHYROIDISM: ICD-10-CM

## 2022-02-01 DIAGNOSIS — C90.00 MULTIPLE MYELOMA NOT HAVING ACHIEVED REMISSION (H): ICD-10-CM

## 2022-02-01 LAB
ERYTHROCYTE [DISTWIDTH] IN BLOOD BY AUTOMATED COUNT: 14.8 % (ref 10–15)
HCT VFR BLD AUTO: 33.8 % (ref 35–47)
HGB BLD-MCNC: 11 G/DL (ref 11.7–15.7)
MCH RBC QN AUTO: 32 PG (ref 26.5–33)
MCHC RBC AUTO-ENTMCNC: 32.5 G/DL (ref 31.5–36.5)
MCV RBC AUTO: 98 FL (ref 78–100)
PLATELET # BLD AUTO: 168 10E3/UL (ref 150–450)
RBC # BLD AUTO: 3.44 10E6/UL (ref 3.8–5.2)
T4 FREE SERPL-MCNC: 0.81 NG/DL (ref 0.76–1.46)
TSH SERPL DL<=0.005 MIU/L-ACNC: 4.25 MU/L (ref 0.4–4)
WBC # BLD AUTO: 6.2 10E3/UL (ref 4–11)

## 2022-02-01 PROCEDURE — 85027 COMPLETE CBC AUTOMATED: CPT | Performed by: INTERNAL MEDICINE

## 2022-02-01 PROCEDURE — 36415 COLL VENOUS BLD VENIPUNCTURE: CPT

## 2022-02-01 PROCEDURE — 84443 ASSAY THYROID STIM HORMONE: CPT | Performed by: INTERNAL MEDICINE

## 2022-02-01 PROCEDURE — 80053 COMPREHEN METABOLIC PANEL: CPT | Performed by: INTERNAL MEDICINE

## 2022-02-01 PROCEDURE — 84439 ASSAY OF FREE THYROXINE: CPT | Performed by: INTERNAL MEDICINE

## 2022-02-01 NOTE — PROGRESS NOTES
Medical Assistant Note:  Quiana Dunaway presents today for lab draw.    Patient seen by provider today: No.   present during visit today: Not Applicable.    Concerns: No Concerns.    Procedure:  Lab draw site: RAC , Needle type: Butterfly, Gauge: 23.    Post Assessment:  Labs drawn without difficulty: Yes.    Discharge Plan:  Departure Mode: Ambulatory.    Face to Face Time: 4 min.    Shari Schoenberger, CMA

## 2022-02-07 ENCOUNTER — TELEPHONE (OUTPATIENT)
Dept: ONCOLOGY | Facility: CLINIC | Age: 87
End: 2022-02-07
Payer: MEDICARE

## 2022-02-07 ENCOUNTER — VIRTUAL VISIT (OUTPATIENT)
Dept: ONCOLOGY | Facility: CLINIC | Age: 87
End: 2022-02-07
Attending: INTERNAL MEDICINE
Payer: MEDICARE

## 2022-02-07 ENCOUNTER — TELEPHONE (OUTPATIENT)
Dept: ONCOLOGY | Facility: CLINIC | Age: 87
End: 2022-02-07

## 2022-02-07 DIAGNOSIS — E05.90 HYPERTHYROIDISM: ICD-10-CM

## 2022-02-07 DIAGNOSIS — C90.00 MULTIPLE MYELOMA NOT HAVING ACHIEVED REMISSION (H): Primary | ICD-10-CM

## 2022-02-07 PROCEDURE — 99213 OFFICE O/P EST LOW 20 MIN: CPT | Mod: 95 | Performed by: INTERNAL MEDICINE

## 2022-02-07 NOTE — PROGRESS NOTES
Quiana is a 86 year old who is being evaluated via a billable video visit.      How would you like to obtain your AVS? MyChart  If the video visit is dropped, the invitation should be resent by: Text to cell phone: [phone]  Will anyone else be joining your video visit? No Daughter-in-law is there with her.    Mary Mi VF

## 2022-02-07 NOTE — LETTER
2/7/2022         RE: Quiana Dunaway  [address] [facility] [address]        Dear Colleague,    Thank you for referring your patient, Quiana Dunaway, to the [facility] [place]. Please see a copy of my visit note below.    Quiana is a 86 year old who is being evaluated via a billable video visit.      How would you like to obtain your AVS? MyChart  If the video visit is dropped, the invitation should be resent by: Text to cell phone: [phone]  Will anyone else be joining your video visit? No Daughter-in-law is there with her.    Mary Mi             HEMATOLOGY HISTORY: Mrs. Dunaway is a lady with multiple myeloma (IgG kappa). High risk, t(14;16).  1. On 05/01/2017, WBC of 3.4, hemoglobin of 10.2 and platelet of 154.  2. SPEP on 07/07/2017 revealed M-spike of 1.8.  3. Bone marrow biopsy on 07/12/2017 reveals kappa monotypic plasma cell population consistent with multiple myeloma.  Bone marrow is 70% cellular.  Plasma cell is 50-60%.     -There is gain of chromosome 1, 5, 9, 15, and 17.  There is translocation 14;16.   4.  On 07/18/2017:  -M-spike of 1.9.   -Immunofixation reveals monoclonal IgG kappa.    -IgG level of 2970.  IgA of 15 and IgM of 175.    -Kappa free light chain of 67.7.  Lambda free light chain of 1.42.  Ratio of kappa to lambda of 47.71.    -Beta 2 microglobulin of 3.4.   5. PET scan on 07/24/2017 reveals several focal areas of increased FDG uptake consistent with multiple myeloma.  There is probable epithelial cyst in tail of the pancreas.  No associated abnormal FDG activity.  Left thyroid cysts or nodules without any FDG activity.  There is a 0.3 cm left upper lobe lung nodule.   6.  Velcade, Revlimid and dexamethasone between on 08/14/2017 and 04/30/2018. Velcade stopped.   -Revlimid and dexamethasone continued.  7. CT chest, abdomen, and pelvis on 12/30/2021 does not reveal any malignancy.     SUBJECTIVE:  Ms. Dunaway is an 86-year-old female with  multiple myeloma. We are holding the Revlimid as patient was not feeling good.  Dexamethasone weekly was continued.  Patient was very weak and not getting anticoagulation.  Patient follows-up with PCP and found to have Graves' disease.      Patient says that she is slowly improving.  Her fatigue is less.  Appetite is improving.  Not in pain.  No headache.  No lightheadedness.  No chest pain or shortness of breath.  No nausea or vomiting.     PHYSICAL EXAMINATION:    She is alert and oriented x 3.  Not in any distress.   The rest of the systems were not examined as this is a video visit.     LABORATORY: CBC, CMP, TSH and free T4 reviewed.       ASSESSMENT:    1.  An 86-year-old female with multiple myeloma.  Revlimid on hold.  2.  Weakness, decreased appetite and anhedonia, improving.  3.  Graves' disease.     PLAN:    1.  Patient is slowly improving.  Her fatigue is less.  She is eating better.  Mood is better.  This improvement is due to holding Revlimid and treatment of Graves' disease.    Discussed regarding Revlimid.  At this time I am going to hold it for another month.  Patient is improving and I want to do her some more time to improve.  She will continue on weekly dexamethasone.  We will continue to hold zometa.  Patient and daughter agreeable for this plan.      2.  She will continue on methimazole for Graves' disease.       3.  She and her daughter had a few questions, which were all answered.  I will see her in a month.     TOTAL VIDEO VISIT TIME:  Fifteen minutes.  Time was spent in today's visit, review of chart/investigations today and documentation.      Again, thank you for allowing me to participate in the care of your patient.        Sincerely,        Alex Parry MD

## 2022-02-07 NOTE — TELEPHONE ENCOUNTER
Talked with daughter who was on her way over to Quiana's house. Asked me to call her back in 5 minutes. PO

## 2022-02-07 NOTE — LETTER
2/7/2022         RE: Quiana Dunaway  [address] [facility] [address]        Dear Colleague,    Thank you for referring your patient, Quiana Dunaway, to the [facility] [place]. Please see a copy of my visit note below.    Quiana is a 86 year old who is being evaluated via a billable video visit.      How would you like to obtain your AVS? MyChart  If the video visit is dropped, the invitation should be resent by: Text to cell phone: [phone]  Will anyone else be joining your video visit? No Daughter-in-law is there with her.    Mary Mi             HEMATOLOGY HISTORY: Mrs. Dunaway is a lady with multiple myeloma (IgG kappa). High risk, t(14;16).  1. On 05/01/2017, WBC of 3.4, hemoglobin of 10.2 and platelet of 154.  2. SPEP on 07/07/2017 revealed M-spike of 1.8.  3. Bone marrow biopsy on 07/12/2017 reveals kappa monotypic plasma cell population consistent with multiple myeloma.  Bone marrow is 70% cellular.  Plasma cell is 50-60%.     -There is gain of chromosome 1, 5, 9, 15, and 17.  There is translocation 14;16.   4.  On 07/18/2017:  -M-spike of 1.9.   -Immunofixation reveals monoclonal IgG kappa.    -IgG level of 2970.  IgA of 15 and IgM of 175.    -Kappa free light chain of 67.7.  Lambda free light chain of 1.42.  Ratio of kappa to lambda of 47.71.    -Beta 2 microglobulin of 3.4.   5. PET scan on 07/24/2017 reveals several focal areas of increased FDG uptake consistent with multiple myeloma.  There is probable epithelial cyst in tail of the pancreas.  No associated abnormal FDG activity.  Left thyroid cysts or nodules without any FDG activity.  There is a 0.3 cm left upper lobe lung nodule.   6.  Velcade, Revlimid and dexamethasone between on 08/14/2017 and 04/30/2018. Velcade stopped.   -Revlimid and dexamethasone continued.  7. CT chest, abdomen, and pelvis on 12/30/2021 does not reveal any malignancy.     SUBJECTIVE:  Ms. Dunaway is an 86-year-old female with  multiple myeloma. We are holding the Revlimid as patient was not feeling good.  Dexamethasone weekly was continued.  Patient was very weak and not getting anticoagulation.  Patient follows-up with PCP and found to have Graves' disease.      Patient says that she is slowly improving.  Her fatigue is less.  Appetite is improving.  Not in pain.  No headache.  No lightheadedness.  No chest pain or shortness of breath.  No nausea or vomiting.     PHYSICAL EXAMINATION:    She is alert and oriented x 3.  Not in any distress.   The rest of the systems were not examined as this is a video visit.     LABORATORY: CBC, CMP, TSH and free T4 reviewed.       ASSESSMENT:    1.  An 86-year-old female with multiple myeloma.  Revlimid on hold.  2.  Weakness, decreased appetite and anhedonia, improving.  3.  Graves' disease.     PLAN:    1.  Patient is slowly improving.  Her fatigue is less.  She is eating better.  Mood is better.  This improvement is due to holding Revlimid and treatment of Graves' disease.    Discussed regarding Revlimid.  At this time I am going to hold it for another month.  Patient is improving and I want to do her some more time to improve.  She will continue on weekly dexamethasone.  We will continue to hold zometa.  Patient and daughter agreeable for this plan.      2.  She will continue on methimazole for Graves' disease.       3.  She and her daughter had a few questions, which were all answered.  I will see her in a month.     TOTAL VIDEO VISIT TIME:  Fifteen minutes.  Time was spent in today's visit, review of chart/investigations today and documentation.      Again, thank you for allowing me to participate in the care of your patient.        Sincerely,        Alex Parry MD     normal

## 2022-02-07 NOTE — PROGRESS NOTES
HEMATOLOGY HISTORY: Mrs. Dunaway is a lady with multiple myeloma (IgG kappa). High risk, t(14;16).  1. On 05/01/2017, WBC of 3.4, hemoglobin of 10.2 and platelet of 154.  2. SPEP on 07/07/2017 revealed M-spike of 1.8.  3. Bone marrow biopsy on 07/12/2017 reveals kappa monotypic plasma cell population consistent with multiple myeloma.  Bone marrow is 70% cellular.  Plasma cell is 50-60%.     -There is gain of chromosome 1, 5, 9, 15, and 17.  There is translocation 14;16.   4.  On 07/18/2017:  -M-spike of 1.9.   -Immunofixation reveals monoclonal IgG kappa.    -IgG level of 2970.  IgA of 15 and IgM of 175.    -Kappa free light chain of 67.7.  Lambda free light chain of 1.42.  Ratio of kappa to lambda of 47.71.    -Beta 2 microglobulin of 3.4.   5. PET scan on 07/24/2017 reveals several focal areas of increased FDG uptake consistent with multiple myeloma.  There is probable epithelial cyst in tail of the pancreas.  No associated abnormal FDG activity.  Left thyroid cysts or nodules without any FDG activity.  There is a 0.3 cm left upper lobe lung nodule.   6.  Velcade, Revlimid and dexamethasone between on 08/14/2017 and 04/30/2018. Velcade stopped.   -Revlimid and dexamethasone continued.  7. CT chest, abdomen, and pelvis on 12/30/2021 does not reveal any malignancy.     SUBJECTIVE:  Ms. Dunaway is an 86-year-old female with multiple myeloma. We are holding the Revlimid as patient was not feeling good.  Dexamethasone weekly was continued.  Patient was very weak and not getting anticoagulation.  Patient follows-up with PCP and found to have Graves' disease.      Patient says that she is slowly improving.  Her fatigue is less.  Appetite is improving.  Not in pain.  No headache.  No lightheadedness.  No chest pain or shortness of breath.  No nausea or vomiting.     PHYSICAL EXAMINATION:    She is alert and oriented x 3.  Not in any distress.   The rest of the systems were not examined as this is a video  visit.     LABORATORY: CBC, CMP, TSH and free T4 reviewed.       ASSESSMENT:    1.  An 86-year-old female with multiple myeloma.  Revlimid on hold.  2.  Weakness, decreased appetite and anhedonia, improving.  3.  Graves' disease.     PLAN:    1.  Patient is slowly improving.  Her fatigue is less.  She is eating better.  Mood is better.  This improvement is due to holding Revlimid and treatment of Graves' disease.    Discussed regarding Revlimid.  At this time I am going to hold it for another month.  Patient is improving and I want to do her some more time to improve.  She will continue on weekly dexamethasone.  We will continue to hold zometa.  Patient and daughter agreeable for this plan.      2.  She will continue on methimazole for Graves' disease.       3.  She and her daughter had a few questions, which were all answered.  I will see her in a month.     TOTAL VIDEO VISIT TIME:  Fifteen minutes.  Time was spent in today's visit, review of chart/investigations today and documentation.

## 2022-02-07 NOTE — PATIENT INSTRUCTIONS
1. Hold revlimid.  -Continue weekly dexamethasone.  2. Hold zometa.  3. Continue methimezole.  4. Follow-up in 1 month with labs.

## 2022-02-14 ENCOUNTER — HOSPITAL ENCOUNTER (OUTPATIENT)
Dept: ULTRASOUND IMAGING | Facility: CLINIC | Age: 87
Discharge: HOME | End: 2022-02-14
Attending: INTERNAL MEDICINE | Admitting: INTERNAL MEDICINE
Payer: MEDICARE

## 2022-02-14 DIAGNOSIS — E05.00 GRAVES DISEASE: ICD-10-CM

## 2022-02-14 PROBLEM — E04.2 NON-TOXIC MULTINODULAR GOITER: Status: ACTIVE | Noted: 2022-02-14

## 2022-02-14 PROCEDURE — 76536 US EXAM OF HEAD AND NECK: CPT

## 2022-02-14 NOTE — LETTER
February 14, 2022      Quiana Dunaway  [address] [facility] [id number]        Dear ,    The following letter pertains to your most recent diagnostic tests:     The thyroid ultrasound shows multiple thyroid nodules.  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  The methimazole that you are taking for your overactive thyroid gland is appropriate treatment to normalize the thyroid hormone levels in this scenario.  There is one large thyroid nodule located on the bottom left portion of the thyroid gland.  This nodule should be checked again by ultrasound in 1 years time or sooner if it causes any new symptoms.  Hopefully, your energy levels are improving with the normalization of your thyroid hormone levels on the methimazole.  I look forward to discussing this further with you when I see you on March 1.     Sincerely,     Dr. Chung       Resulted Orders   US Thyroid    Narrative    US THYROID 2/14/2022 1:04 PM    CLINICAL HISTORY: Hyperthyroidism; Graves' disease.    TECHNIQUE: Thyroid ultrasound.     COMPARISON:    FINDINGS:  RIGHT lobe: 3.1 x 1.2 x 1.6 cm. Homogeneous echotexture.  Isthmus: 3 mm.  LEFT lobe: 4.6 x 2.2 x 2.7 cm. Homogeneous echotexture.    NECK: No cervical lymphadenopathy.    NODULES:    Nodule 1: 7 x 6 x 5 mm nodule, upper right.   Composition: Solid or almost completely solid, 2 points   Echogenicity: Hypoechoic, 2 points   Shape: Wider-than-tall, 0 points   Margin: Smooth, 0 points   Echogenic Foci: None, or large comet-tail artifacts, 0 points   Point Total: 4-6 points. TI-RADS 4. If 1.5 cm or larger, recommend  FNA; if 1 cm or larger, follow up US (annually for 5 years).      Nodule 2: 9 x 9 x 6 mm nodule, mid right.  Composition: Unable to determine, 2 points   Echogenicity: Unable to determine, 1 point   Shape: Wider-than-tall, 0 points   Margin: Ill-defined, 0 points    Echogenic Foci: Peripheral (rim) calcifications, 2 points   Point Total: 4-6 points. TI-RADS 4. If 1.5 cm or larger, recommend  FNA; if 1 cm or larger, follow up US (annually for 5 years).    Nodule 3: 3.8 x 1.8 x 2.5 cm nodule, inferior left.  Composition: Mixed cystic and solid, 1 point   Echogenicity: Hyperechoic or isoechoic, 1 point   Shape: Wider-than-tall, 0 points   Margin: Ill-defined, 0 points   Echogenic Foci: Macrocalcifications, 1 point   Point Total: 3 points. TI-RADS 3. If 2.5 cm or larger, recommend FNA;  if 1.5 cm or larger, recommend follow up US at 1, 3, and 5 years.      Impression    IMPRESSION: Multinodular thyroid gland. Could consider FNA or  follow-up of the 3.8 cm left thyroid nodule.    Nodules are characterized per  ACR Thyroid Imaging, Reporting and Data System (TI-RADS): White Paper  of the ACR TI-RADS Committee  Murali Estes. et al. Journal of the American College of  Radiology 2017. Volume 14 (2017), Issue 5, 587-840.      RACQUEL AGUILERA MD         SYSTEM ID:  [id number]

## 2022-02-14 NOTE — RESULT ENCOUNTER NOTE
The following letter pertains to your most recent diagnostic tests:    The thyroid ultrasound shows multiple thyroid nodules.  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  The methimazole that you are taking for your overactive thyroid gland is appropriate treatment to normalize the thyroid hormone levels in this scenario.  There is one large thyroid nodule located on the bottom left portion of the thyroid gland.  This nodule should be checked again by ultrasound in 1 years time or sooner if it causes any new symptoms.  Hopefully, your energy levels are improving with the normalization of your thyroid hormone levels on the methimazole.  I look forward to discussing this further with you when I see you on March 1.    Sincerely,    Dr. Chung

## 2022-02-16 ENCOUNTER — ANCILLARY PROCEDURE (OUTPATIENT)
Dept: CARDIOLOGY | Facility: CLINIC | Age: 87
End: 2022-02-16
Attending: INTERNAL MEDICINE
Payer: MEDICARE

## 2022-02-16 ENCOUNTER — TELEPHONE (OUTPATIENT)
Dept: CARDIOLOGY | Facility: CLINIC | Age: 87
End: 2022-02-16

## 2022-02-16 DIAGNOSIS — Z95.0 CARDIAC PACEMAKER IN SITU: ICD-10-CM

## 2022-02-16 DIAGNOSIS — I49.5 SICK SINUS SYNDROME (H): ICD-10-CM

## 2022-02-16 PROCEDURE — 93280 PM DEVICE PROGR EVAL DUAL: CPT | Performed by: INTERNAL MEDICINE

## 2022-02-16 NOTE — TELEPHONE ENCOUNTER
"Patient had annual in clinic device check performed today of [employer] Advisa dual lead pacemaker. Patient had 1 AT/AF episode logged since last device check on 6/4/2021. EGM shows Afib/AFl with controlled  rates in the 60's. Episode lasted [id number]. Total AT/AF burden is <0.1%. Patient denies symptoms.       Per Dr Worthington's note on 4/17/2018:   \"Paroxysmal AFib, asymptomatic.  Her CHADS-VASc score is 4.  We had an extensive discussion.  Due to the length of the atrial arrhythmia and the fact that she is currently under chemotherapy for myeloma, I do not think there is justification to start blood thinners at this time.  Continue baby aspirin.\"         RV threshold was elevated at 21V@1ms up from 1V@0.4ms last in clinic device check. RV threshold is set to auto and appropriately set to 4V@0.4ms. Pacemaker was implanted for second-degree AV block. Today patient's underlying rhythm was SR in the 60's with CHB and Junctional Escape at 33bpm.           Will route message to Dr Worthington regarding Afib episode lasting [id number] and increased RV threshold as patient is scheduled to see him in clinic on 2/21/22.       Medtronic Advisa (D)Pacemaker Device Check  Patient seen in clinic for device evaluation and iterative programming.   AP: 73 %    : 58 %    Mode: DDDR     Underlying Rhythm: SR in the 60's with CHB and Junctional Escape at 33bpm.     Heart Rate: Stable with good variability.     Sensing: WNL    Pacing Threshold: RA-Stable, RV- slightly elevated at 2V@0.4ms up form 1V@0.4ms at last in clinic device check(7/16/2020)    Impedance: Stable  Battery Status: Estimated at 3 years    Device Site: Well healed    Atrial Arrhythmia: 1 AT/AF episode logged EGM shows Afib with controlled  rates. Episode lasted [id number]. Patient does not recall symptoms. Total AT/AF burden is <0.1%    Ventricular Arrhythmia: None    Setting Change: None      "

## 2022-02-17 LAB
MDC_IDC_EPISODE_DTM: NORMAL
MDC_IDC_EPISODE_DURATION: 489 S
MDC_IDC_EPISODE_ID: 36
MDC_IDC_EPISODE_TYPE: NORMAL
MDC_IDC_LEAD_IMPLANT_DT: NORMAL
MDC_IDC_LEAD_IMPLANT_DT: NORMAL
MDC_IDC_LEAD_LOCATION: NORMAL
MDC_IDC_LEAD_LOCATION: NORMAL
MDC_IDC_LEAD_LOCATION_DETAIL_1: NORMAL
MDC_IDC_LEAD_LOCATION_DETAIL_1: NORMAL
MDC_IDC_LEAD_MFG: NORMAL
MDC_IDC_LEAD_MFG: NORMAL
MDC_IDC_LEAD_MODEL: NORMAL
MDC_IDC_LEAD_MODEL: NORMAL
MDC_IDC_LEAD_POLARITY_TYPE: NORMAL
MDC_IDC_LEAD_POLARITY_TYPE: NORMAL
MDC_IDC_LEAD_SERIAL: NORMAL
MDC_IDC_LEAD_SERIAL: NORMAL
MDC_IDC_MSMT_BATTERY_DTM: NORMAL
MDC_IDC_MSMT_BATTERY_REMAINING_LONGEVITY: 37 MO
MDC_IDC_MSMT_BATTERY_RRT_TRIGGER: 2.83
MDC_IDC_MSMT_BATTERY_STATUS: NORMAL
MDC_IDC_MSMT_BATTERY_VOLTAGE: 2.98 V
MDC_IDC_MSMT_LEADCHNL_RA_IMPEDANCE_VALUE: 342 OHM
MDC_IDC_MSMT_LEADCHNL_RA_IMPEDANCE_VALUE: 361 OHM
MDC_IDC_MSMT_LEADCHNL_RA_PACING_THRESHOLD_AMPLITUDE: 0.38 V
MDC_IDC_MSMT_LEADCHNL_RA_PACING_THRESHOLD_PULSEWIDTH: 0.4 MS
MDC_IDC_MSMT_LEADCHNL_RA_SENSING_INTR_AMPL: 2.25 MV
MDC_IDC_MSMT_LEADCHNL_RA_SENSING_INTR_AMPL: 2.88 MV
MDC_IDC_MSMT_LEADCHNL_RV_IMPEDANCE_VALUE: 399 OHM
MDC_IDC_MSMT_LEADCHNL_RV_IMPEDANCE_VALUE: 494 OHM
MDC_IDC_MSMT_LEADCHNL_RV_PACING_THRESHOLD_AMPLITUDE: 2 V
MDC_IDC_MSMT_LEADCHNL_RV_PACING_THRESHOLD_PULSEWIDTH: 0.4 MS
MDC_IDC_MSMT_LEADCHNL_RV_SENSING_INTR_AMPL: 1.25 MV
MDC_IDC_MSMT_LEADCHNL_RV_SENSING_INTR_AMPL: 5.38 MV
MDC_IDC_PG_IMPLANT_DTM: NORMAL
MDC_IDC_PG_MFG: NORMAL
MDC_IDC_PG_MODEL: NORMAL
MDC_IDC_PG_SERIAL: NORMAL
MDC_IDC_PG_TYPE: NORMAL
MDC_IDC_SESS_CLINIC_NAME: NORMAL
MDC_IDC_SESS_DTM: NORMAL
MDC_IDC_SESS_TYPE: NORMAL
MDC_IDC_SET_BRADY_AT_MODE_SWITCH_RATE: 171 {BEATS}/MIN
MDC_IDC_SET_BRADY_HYSTRATE: NORMAL
MDC_IDC_SET_BRADY_LOWRATE: 60 {BEATS}/MIN
MDC_IDC_SET_BRADY_MAX_SENSOR_RATE: 120 {BEATS}/MIN
MDC_IDC_SET_BRADY_MAX_TRACKING_RATE: 120 {BEATS}/MIN
MDC_IDC_SET_BRADY_MODE: NORMAL
MDC_IDC_SET_BRADY_PAV_DELAY_LOW: 300 MS
MDC_IDC_SET_BRADY_SAV_DELAY_LOW: 300 MS
MDC_IDC_SET_LEADCHNL_RA_PACING_AMPLITUDE: 1.5 V
MDC_IDC_SET_LEADCHNL_RA_PACING_ANODE_ELECTRODE_1: NORMAL
MDC_IDC_SET_LEADCHNL_RA_PACING_ANODE_LOCATION_1: NORMAL
MDC_IDC_SET_LEADCHNL_RA_PACING_CAPTURE_MODE: NORMAL
MDC_IDC_SET_LEADCHNL_RA_PACING_CATHODE_ELECTRODE_1: NORMAL
MDC_IDC_SET_LEADCHNL_RA_PACING_CATHODE_LOCATION_1: NORMAL
MDC_IDC_SET_LEADCHNL_RA_PACING_POLARITY: NORMAL
MDC_IDC_SET_LEADCHNL_RA_PACING_PULSEWIDTH: 0.4 MS
MDC_IDC_SET_LEADCHNL_RA_SENSING_ANODE_ELECTRODE_1: NORMAL
MDC_IDC_SET_LEADCHNL_RA_SENSING_ANODE_LOCATION_1: NORMAL
MDC_IDC_SET_LEADCHNL_RA_SENSING_CATHODE_ELECTRODE_1: NORMAL
MDC_IDC_SET_LEADCHNL_RA_SENSING_CATHODE_LOCATION_1: NORMAL
MDC_IDC_SET_LEADCHNL_RA_SENSING_POLARITY: NORMAL
MDC_IDC_SET_LEADCHNL_RA_SENSING_SENSITIVITY: 0.3 MV
MDC_IDC_SET_LEADCHNL_RV_PACING_AMPLITUDE: 4 V
MDC_IDC_SET_LEADCHNL_RV_PACING_ANODE_ELECTRODE_1: NORMAL
MDC_IDC_SET_LEADCHNL_RV_PACING_ANODE_LOCATION_1: NORMAL
MDC_IDC_SET_LEADCHNL_RV_PACING_CAPTURE_MODE: NORMAL
MDC_IDC_SET_LEADCHNL_RV_PACING_CATHODE_ELECTRODE_1: NORMAL
MDC_IDC_SET_LEADCHNL_RV_PACING_CATHODE_LOCATION_1: NORMAL
MDC_IDC_SET_LEADCHNL_RV_PACING_POLARITY: NORMAL
MDC_IDC_SET_LEADCHNL_RV_PACING_PULSEWIDTH: 0.4 MS
MDC_IDC_SET_LEADCHNL_RV_SENSING_ANODE_ELECTRODE_1: NORMAL
MDC_IDC_SET_LEADCHNL_RV_SENSING_ANODE_LOCATION_1: NORMAL
MDC_IDC_SET_LEADCHNL_RV_SENSING_CATHODE_ELECTRODE_1: NORMAL
MDC_IDC_SET_LEADCHNL_RV_SENSING_CATHODE_LOCATION_1: NORMAL
MDC_IDC_SET_LEADCHNL_RV_SENSING_POLARITY: NORMAL
MDC_IDC_SET_LEADCHNL_RV_SENSING_SENSITIVITY: 0.9 MV
MDC_IDC_SET_ZONE_DETECTION_INTERVAL: 350 MS
MDC_IDC_SET_ZONE_DETECTION_INTERVAL: 400 MS
MDC_IDC_SET_ZONE_TYPE: NORMAL
MDC_IDC_STAT_AT_BURDEN_PERCENT: 0 %
MDC_IDC_STAT_AT_DTM_END: NORMAL
MDC_IDC_STAT_AT_DTM_START: NORMAL
MDC_IDC_STAT_BRADY_AP_VP_PERCENT: 38.02 %
MDC_IDC_STAT_BRADY_AP_VS_PERCENT: 33.39 %
MDC_IDC_STAT_BRADY_AS_VP_PERCENT: 16.79 %
MDC_IDC_STAT_BRADY_AS_VS_PERCENT: 11.81 %
MDC_IDC_STAT_BRADY_DTM_END: NORMAL
MDC_IDC_STAT_BRADY_DTM_START: NORMAL
MDC_IDC_STAT_BRADY_RA_PERCENT_PACED: 71.24 %
MDC_IDC_STAT_BRADY_RV_PERCENT_PACED: 54.77 %
MDC_IDC_STAT_EPISODE_RECENT_COUNT: 0
MDC_IDC_STAT_EPISODE_RECENT_COUNT: 5
MDC_IDC_STAT_EPISODE_RECENT_COUNT_DTM_END: NORMAL
MDC_IDC_STAT_EPISODE_RECENT_COUNT_DTM_START: NORMAL
MDC_IDC_STAT_EPISODE_TOTAL_COUNT: 0
MDC_IDC_STAT_EPISODE_TOTAL_COUNT: 0
MDC_IDC_STAT_EPISODE_TOTAL_COUNT: 11
MDC_IDC_STAT_EPISODE_TOTAL_COUNT: 22
MDC_IDC_STAT_EPISODE_TOTAL_COUNT_DTM_END: NORMAL
MDC_IDC_STAT_EPISODE_TOTAL_COUNT_DTM_START: NORMAL
MDC_IDC_STAT_EPISODE_TYPE: NORMAL

## 2022-02-21 ENCOUNTER — OFFICE VISIT (OUTPATIENT)
Dept: CARDIOLOGY | Facility: CLINIC | Age: 87
End: 2022-02-21
Attending: INTERNAL MEDICINE
Payer: MEDICARE

## 2022-02-21 VITALS
DIASTOLIC BLOOD PRESSURE: 83 MMHG | WEIGHT: 138 LBS | OXYGEN SATURATION: 95 % | HEIGHT: 62 IN | HEART RATE: 87 BPM | SYSTOLIC BLOOD PRESSURE: 131 MMHG | BODY MASS INDEX: 25.4 KG/M2

## 2022-02-21 DIAGNOSIS — Z95.0 CARDIAC PACEMAKER IN SITU: ICD-10-CM

## 2022-02-21 DIAGNOSIS — I49.5 SICK SINUS SYNDROME (H): ICD-10-CM

## 2022-02-21 PROCEDURE — 99214 OFFICE O/P EST MOD 30 MIN: CPT | Performed by: INTERNAL MEDICINE

## 2022-02-21 NOTE — LETTER
"2/21/2022    César Chung MD  [address]  [place] [address]    RE: Quiana Dunaway       Dear Colleague,     I had the pleasure of seeing Quiana Dunaway in the [facility].  Electrophysiology/ Clinic Note         H&P and Plan:     REASON FOR VISIT: Electrophysiology evaluation.      HISTORY OF PRESENT ILLNESS: Ms. Dunaway is a delightful 86-year-old lady with a history of  hypertension, multiple myeloma, preserved EF heart failure and symptomatic second-degree AV block (pacemaker implantation on 03/27/2017), who is here for routine evaluation.      Today, she informs she is doing well.  She has no complaints during this visit.  She denies any symptoms such as chest pain, shortness of breath, lightheadedness, near-syncope or syncope.     Device was interrogated 2/16/2022.  She is atrially paced 73% time and ventricular paced 58% of time.  RV lead threshold was slightly elevated (2V at 0.4 ms) but impedance was stable.      PREVIOUS STUDIES (personally reviewed):  - Echocardiogram (05/25/2020): Normal LV function.  EF was estimated at 60%-65%.  No significant valve disease was noticed.    -Nuclear stress test (05/26/2020):  Negative for ischemia.  -Chest CT (05/24/2020): No PE. No consolidation. Slight intralobular septal thickening suggesting component of interstitial edema. Stable left thyroid and low density left breast nodule.  - LE US (05/24/2020):  No deep venous thrombosis in the left lower extremity.     ASSESSMENT AND PLAN:   1. Device care.  RV lead threshold was slightly elevated.  We will recheck threshold in the month.  2. Hypertension.  Blood pressure is well controlled.  Continue current therapy with lisinopril and Lasix.   3.  Preserved EF heart failure.  Euvolemic on physical exam.        Alberto Worthington MD    Physical Exam:  Vitals: /83   Pulse 87   Ht 1.575 m (5' 2\")   Wt 62.6 kg (138 lb)   SpO2 95%   BMI 25.24 kg/m      Constitutional:  AAO x3.  Pt is " in NAD.  HEAD: normocephalic.  SKIN: Skin normal color, texture and turgor with no lesions or eruptions.  Eyes: PERRL, EOMI.  ENT:  Supple, normal JVP. No lymphadenopathy or thyroid enlargement.  Chest:  CTAB.  Cardiac:  RRR, normal  S1 and S2.  No murmurs rubs or gallop.    Abdomen:  Normal BS.  Soft, non-tender and non-distended.  No rebound or guarding.    Extremities:  Pedious pulses palpable B/L.  No LE edema noticed.   Neurological: Strength and sensation grossly symmetric and intact throughout.       CURRENT MEDICATIONS:  Current Outpatient Medications   Medication Sig Dispense Refill     acyclovir (ZOVIRAX) 400 MG tablet Take 1 tablet (400 mg) by mouth 2 times daily Viral Prophylaxis. 60 tablet 5     aspirin 81 MG chewable tablet Take 81 mg by mouth daily        BETA BLOCKER NOT PRESCRIBED (INTENTIONAL) Please choose reason not prescribed from choices below.       cyanocobalamin (VITAMIN B-12) 1000 MCG tablet Take 1 tablet (1,000 mcg) by mouth daily 90 tablet 3     dexamethasone (DECADRON) 4 MG tablet Take 1 tablet (4 mg) by mouth once a week Once a week with food on Days 1,8,15. 3 tablet 0     estradiol (ESTRACE VAGINAL) 0.1 MG/GM vaginal cream Apply small amount to the vaginal opening and urethra M, W, F @ h.s. 42.5 g 3     famotidine (PEPCID) 40 MG tablet Take 1 tablet (40 mg) by mouth 2 times daily 180 tablet 3     furosemide (LASIX) 20 MG tablet Take 0.5 tablets (10 mg) by mouth daily as needed (For BP greater than 160/90) 90 tablet 3     lisinopril (ZESTRIL) 5 MG tablet Take 1 tablet (5 mg) by mouth daily 90 tablet 1     Loperamide HCl (IMODIUM A-D PO) Take 1 tablet by mouth as needed        LORazepam (ATIVAN) 0.5 MG tablet Take 1 tablet (0.5 mg) by mouth every 6 hours as needed for anxiety 30 tablet 3     methimazole (TAPAZOLE) 5 MG tablet Take 1 tablet (5 mg) by mouth daily 90 tablet 3     mirabegron (MYRBETRIQ) 50 MG 24 hr tablet Take 1 tablet (50 mg) by mouth daily 90 tablet 4     nitroglycerin  (NITROSTAT) 0.4 MG sublingual tablet For chest pain place 1 tablet under the tongue every 5 minutes for 3 doses. If symptoms persist 5 minutes after 1st dose call 911. 25 tablet 0     ondansetron (ZOFRAN) 4 MG tablet Take 1 tablet (4 mg) by mouth every 8 hours as needed for nausea 30 tablet 11     potassium chloride ER (KLOR-CON M) 10 MEQ CR tablet Take 1 tablet (10 mEq) by mouth 2 times daily 180 tablet 3     triamcinolone (KENALOG) 0.1 % paste Apply to tongue lesion twice daily for one week. 5 g 3     vitamin D3 (CHOLECALCIFEROL) 50 mcg (2000 units) tablet Take 1 tablet by mouth daily       zinc gluconate 50 MG tablet Take 50 mg by mouth daily       Zoledronic Acid (ZOMETA IV) Inject 4 mg into the vein every 3 months        LENalidomide (REVLIMID) 10 MG CAPS capsule Take 1 capsule (10 mg) by mouth daily for 14 days Take on Days 1 through 14 of 21-day cycle. 14 capsule 0       ALLERGIES     Allergies   Allergen Reactions     Levaquin [Levofloxacin] Other (See Comments)     Tingling in feet after 1 dose on 8/7/19         PAST MEDICAL HISTORY:  Past Medical History:   Diagnosis Date     Anxiety      HTN (hypertension)      Multiple myeloma not having achieved remission (H) 7/18/2017     Pacemaker     Placed due to symptomatic second degree AV Block     Pancytopenia (H) 6/28/2017     Paroxysmal atrial fibrillation (H)        PAST SURGICAL HISTORY:  Past Surgical History:   Procedure Laterality Date     ------------OTHER-------------      cataract eye surgery     BONE MARROW BIOPSY, BONE SPECIMEN, NEEDLE/TROCAR N/A 7/12/2017    Procedure: BIOPSY BONE MARROW;  BONE MARROW BIOPSY ;  Surgeon: Grace Vela MD;  Location:  GI     IMPLANT PACEMAKER  03/2017    AV block       FAMILY HISTORY:  Family History   Problem Relation Age of Onset     Unknown/Adopted Mother      Unknown/Adopted Father        SOCIAL HISTORY:  Social History     Socioeconomic History     Marital status:      Spouse name: None      Number of children: None     Years of education: None     Highest education level: None   Occupational History     None   Tobacco Use     Smoking status: Never Smoker     Smokeless tobacco: Never Used   Substance and Sexual Activity     Alcohol use: No     Alcohol/week: 0.0 standard drinks     Drug use: No     Sexual activity: Never   Other Topics Concern     Parent/sibling w/ CABG, MI or angioplasty before 65F 55M? Not Asked   Social History Narrative     None     Social Determinants of Health     Financial Resource Strain: Low Risk      Difficulty of Paying Living Expenses: Not hard at all   Food Insecurity: No Food Insecurity     Worried About Running Out of Food in the Last Year: Never true     Ran Out of Food in the Last Year: Never true   Transportation Needs: No Transportation Needs     Lack of Transportation (Medical): No     Lack of Transportation (Non-Medical): No   Physical Activity: Not on file   Stress: Not on file   Social Connections: Not on file   Intimate Partner Violence: Not on file   Housing Stability: Not on file       Review of Systems:  Skin:  Negative     Eyes:  Negative    ENT:  Negative    Respiratory:  Positive for shortness of breath;dyspnea on exertion  Cardiovascular:    Positive for;palpitations;heaviness;fatigue  Gastroenterology: Negative    Genitourinary:  Negative    Musculoskeletal:  Negative    Neurologic:  Positive for numbness or tingling of feet  Psychiatric:  Positive for sleep disturbances  Heme/Lymph/Imm:  Negative    Endocrine:  Positive for thyroid disorder      Recent Lab Results:  LIPID RESULTS:  Lab Results   Component Value Date    CHOL 152 06/01/2021    HDL 77 06/01/2021    LDL 58 06/01/2021    TRIG 83 06/01/2021    CHOLHDLRATIO 2.8 03/04/2009       LIVER ENZYME RESULTS:  Lab Results   Component Value Date    AST 9 02/01/2022    AST 10 06/02/2021    ALT 14 02/01/2022    ALT 22 06/02/2021       CBC RESULTS:  Lab Results   Component Value Date    WBC 6.2 02/01/2022     WBC 3.9 (L) 06/02/2021    RBC 3.44 (L) 02/01/2022    RBC 3.73 (L) 06/02/2021    HGB 11.0 (L) 02/01/2022    HGB 11.8 06/02/2021    HCT 33.8 (L) 02/01/2022    HCT 35.4 06/02/2021    MCV 98 02/01/2022    MCV 95 06/02/2021    MCH 32.0 02/01/2022    MCH 31.6 06/02/2021    MCHC 32.5 02/01/2022    MCHC 33.3 06/02/2021    RDW 14.8 02/01/2022    RDW 13.2 06/02/2021     02/01/2022     (L) 06/02/2021       BMP RESULTS:  Lab Results   Component Value Date     02/01/2022     06/02/2021    POTASSIUM 3.7 02/01/2022    POTASSIUM 3.7 06/02/2021    CHLORIDE 106 02/01/2022    CHLORIDE 108 06/02/2021    CO2 30 02/01/2022    CO2 30 06/02/2021    ANIONGAP 4 02/01/2022    ANIONGAP 3 06/02/2021    GLC 99 02/01/2022    GLC 83 06/02/2021    BUN 27 02/01/2022    BUN 23 06/02/2021    CR 0.79 02/01/2022    CR 0.67 06/02/2021    GFRESTIMATED 73 02/01/2022    GFRESTIMATED 80 06/02/2021    GFRESTBLACK >90 06/02/2021    HUMBERTO 8.9 02/01/2022    HUMBERTO 8.8 06/02/2021        A1C RESULTS:  No results found for: A1C    INR RESULTS:  No results found for: INR      ECHOCARDIOGRAM  No results found for this or any previous visit (from the past 8760 hour(s)).      No orders of the defined types were placed in this encounter.    No orders of the defined types were placed in this encounter.    There are no discontinued medications.      Encounter Diagnoses   Name Primary?     Sick sinus syndrome (H)      Cardiac pacemaker in situ          Alberto Worthington MD   [facility]      cc:   Shane Zuluaga MD  [address]  [place] [address]

## 2022-02-21 NOTE — PROGRESS NOTES
"Electrophysiology/ Clinic Note         H&P and Plan:     REASON FOR VISIT: Electrophysiology evaluation.      HISTORY OF PRESENT ILLNESS: Ms. Dunaway is a delightful 86-year-old lady with a history of  hypertension, multiple myeloma, preserved EF heart failure and symptomatic second-degree AV block (pacemaker implantation on 03/27/2017), who is here for routine evaluation.      Today, she informs she is doing well.  She has no complaints during this visit.  She denies any symptoms such as chest pain, shortness of breath, lightheadedness, near-syncope or syncope.     Device was interrogated 2/16/2022.  She is atrially paced 73% time and ventricular paced 58% of time.  RV lead threshold was slightly elevated (2V at 0.4 ms) but impedance was stable.      PREVIOUS STUDIES (personally reviewed):  - Echocardiogram (05/25/2020): Normal LV function.  EF was estimated at 60%-65%.  No significant valve disease was noticed.    -Nuclear stress test (05/26/2020):  Negative for ischemia.  -Chest CT (05/24/2020): No PE. No consolidation. Slight intralobular septal thickening suggesting component of interstitial edema. Stable left thyroid and low density left breast nodule.  - LE US (05/24/2020):  No deep venous thrombosis in the left lower extremity.     ASSESSMENT AND PLAN:   1. Device care.  RV lead threshold was slightly elevated.  We will recheck threshold in the month.  2. Hypertension.  Blood pressure is well controlled.  Continue current therapy with lisinopril and Lasix.   3.  Preserved EF heart failure.  Euvolemic on physical exam.        Alberto Worthington MD    Physical Exam:  Vitals: /83   Pulse 87   Ht 1.575 m (5' 2\")   Wt 62.6 kg (138 lb)   SpO2 95%   BMI 25.24 kg/m      Constitutional:  AAO x3.  Pt is in NAD.  HEAD: normocephalic.  SKIN: Skin normal color, texture and turgor with no lesions or eruptions.  Eyes: PERRL, EOMI.  ENT:  Supple, normal JVP. No lymphadenopathy or thyroid enlargement.  Chest:  " CTAB.  Cardiac:  RRR, normal  S1 and S2.  No murmurs rubs or gallop.    Abdomen:  Normal BS.  Soft, non-tender and non-distended.  No rebound or guarding.    Extremities:  Pedious pulses palpable B/L.  No LE edema noticed.   Neurological: Strength and sensation grossly symmetric and intact throughout.       CURRENT MEDICATIONS:  Current Outpatient Medications   Medication Sig Dispense Refill     acyclovir (ZOVIRAX) 400 MG tablet Take 1 tablet (400 mg) by mouth 2 times daily Viral Prophylaxis. 60 tablet 5     aspirin 81 MG chewable tablet Take 81 mg by mouth daily        BETA BLOCKER NOT PRESCRIBED (INTENTIONAL) Please choose reason not prescribed from choices below.       cyanocobalamin (VITAMIN B-12) 1000 MCG tablet Take 1 tablet (1,000 mcg) by mouth daily 90 tablet 3     dexamethasone (DECADRON) 4 MG tablet Take 1 tablet (4 mg) by mouth once a week Once a week with food on Days 1,8,15. 3 tablet 0     estradiol (ESTRACE VAGINAL) 0.1 MG/GM vaginal cream Apply small amount to the vaginal opening and urethra M, W, F @ h.s. 42.5 g 3     famotidine (PEPCID) 40 MG tablet Take 1 tablet (40 mg) by mouth 2 times daily 180 tablet 3     furosemide (LASIX) 20 MG tablet Take 0.5 tablets (10 mg) by mouth daily as needed (For BP greater than 160/90) 90 tablet 3     lisinopril (ZESTRIL) 5 MG tablet Take 1 tablet (5 mg) by mouth daily 90 tablet 1     Loperamide HCl (IMODIUM A-D PO) Take 1 tablet by mouth as needed        LORazepam (ATIVAN) 0.5 MG tablet Take 1 tablet (0.5 mg) by mouth every 6 hours as needed for anxiety 30 tablet 3     methimazole (TAPAZOLE) 5 MG tablet Take 1 tablet (5 mg) by mouth daily 90 tablet 3     mirabegron (MYRBETRIQ) 50 MG 24 hr tablet Take 1 tablet (50 mg) by mouth daily 90 tablet 4     nitroglycerin (NITROSTAT) 0.4 MG sublingual tablet For chest pain place 1 tablet under the tongue every 5 minutes for 3 doses. If symptoms persist 5 minutes after 1st dose call 911. 25 tablet 0     ondansetron (ZOFRAN) 4  MG tablet Take 1 tablet (4 mg) by mouth every 8 hours as needed for nausea 30 tablet 11     potassium chloride ER (KLOR-CON M) 10 MEQ CR tablet Take 1 tablet (10 mEq) by mouth 2 times daily 180 tablet 3     triamcinolone (KENALOG) 0.1 % paste Apply to tongue lesion twice daily for one week. 5 g 3     vitamin D3 (CHOLECALCIFEROL) 50 mcg (2000 units) tablet Take 1 tablet by mouth daily       zinc gluconate 50 MG tablet Take 50 mg by mouth daily       Zoledronic Acid (ZOMETA IV) Inject 4 mg into the vein every 3 months        LENalidomide (REVLIMID) 10 MG CAPS capsule Take 1 capsule (10 mg) by mouth daily for 14 days Take on Days 1 through 14 of 21-day cycle. 14 capsule 0       ALLERGIES     Allergies   Allergen Reactions     Levaquin [Levofloxacin] Other (See Comments)     Tingling in feet after 1 dose on 8/7/19         PAST MEDICAL HISTORY:  Past Medical History:   Diagnosis Date     Anxiety      HTN (hypertension)      Multiple myeloma not having achieved remission (H) 7/18/2017     Pacemaker     Placed due to symptomatic second degree AV Block     Pancytopenia (H) 6/28/2017     Paroxysmal atrial fibrillation (H)        PAST SURGICAL HISTORY:  Past Surgical History:   Procedure Laterality Date     ------------OTHER-------------      cataract eye surgery     BONE MARROW BIOPSY, BONE SPECIMEN, NEEDLE/TROCAR N/A 7/12/2017    Procedure: BIOPSY BONE MARROW;  BONE MARROW BIOPSY ;  Surgeon: Grace Vela MD;  Location:  GI     IMPLANT PACEMAKER  03/2017    AV block       FAMILY HISTORY:  Family History   Problem Relation Age of Onset     Unknown/Adopted Mother      Unknown/Adopted Father        SOCIAL HISTORY:  Social History     Socioeconomic History     Marital status:      Spouse name: None     Number of children: None     Years of education: None     Highest education level: None   Occupational History     None   Tobacco Use     Smoking status: Never Smoker     Smokeless tobacco: Never Used    Substance and Sexual Activity     Alcohol use: No     Alcohol/week: 0.0 standard drinks     Drug use: No     Sexual activity: Never   Other Topics Concern     Parent/sibling w/ CABG, MI or angioplasty before 65F 55M? Not Asked   Social History Narrative     None     Social Determinants of Health     Financial Resource Strain: Low Risk      Difficulty of Paying Living Expenses: Not hard at all   Food Insecurity: No Food Insecurity     Worried About Running Out of Food in the Last Year: Never true     Ran Out of Food in the Last Year: Never true   Transportation Needs: No Transportation Needs     Lack of Transportation (Medical): No     Lack of Transportation (Non-Medical): No   Physical Activity: Not on file   Stress: Not on file   Social Connections: Not on file   Intimate Partner Violence: Not on file   Housing Stability: Not on file       Review of Systems:  Skin:  Negative     Eyes:  Negative    ENT:  Negative    Respiratory:  Positive for shortness of breath;dyspnea on exertion  Cardiovascular:    Positive for;palpitations;heaviness;fatigue  Gastroenterology: Negative    Genitourinary:  Negative    Musculoskeletal:  Negative    Neurologic:  Positive for numbness or tingling of feet  Psychiatric:  Positive for sleep disturbances  Heme/Lymph/Imm:  Negative    Endocrine:  Positive for thyroid disorder      Recent Lab Results:  LIPID RESULTS:  Lab Results   Component Value Date    CHOL 152 06/01/2021    HDL 77 06/01/2021    LDL 58 06/01/2021    TRIG 83 06/01/2021    CHOLHDLRATIO 2.8 03/04/2009       LIVER ENZYME RESULTS:  Lab Results   Component Value Date    AST 9 02/01/2022    AST 10 06/02/2021    ALT 14 02/01/2022    ALT 22 06/02/2021       CBC RESULTS:  Lab Results   Component Value Date    WBC 6.2 02/01/2022    WBC 3.9 (L) 06/02/2021    RBC 3.44 (L) 02/01/2022    RBC 3.73 (L) 06/02/2021    HGB 11.0 (L) 02/01/2022    HGB 11.8 06/02/2021    HCT 33.8 (L) 02/01/2022    HCT 35.4 06/02/2021    MCV 98 02/01/2022     MCV 95 06/02/2021    MCH 32.0 02/01/2022    MCH 31.6 06/02/2021    MCHC 32.5 02/01/2022    MCHC 33.3 06/02/2021    RDW 14.8 02/01/2022    RDW 13.2 06/02/2021     02/01/2022     (L) 06/02/2021       BMP RESULTS:  Lab Results   Component Value Date     02/01/2022     06/02/2021    POTASSIUM 3.7 02/01/2022    POTASSIUM 3.7 06/02/2021    CHLORIDE 106 02/01/2022    CHLORIDE 108 06/02/2021    CO2 30 02/01/2022    CO2 30 06/02/2021    ANIONGAP 4 02/01/2022    ANIONGAP 3 06/02/2021    GLC 99 02/01/2022    GLC 83 06/02/2021    BUN 27 02/01/2022    BUN 23 06/02/2021    CR 0.79 02/01/2022    CR 0.67 06/02/2021    GFRESTIMATED 73 02/01/2022    GFRESTIMATED 80 06/02/2021    GFRESTBLACK >90 06/02/2021    HUMBERTO 8.9 02/01/2022    HUMBERTO 8.8 06/02/2021        A1C RESULTS:  No results found for: A1C    INR RESULTS:  No results found for: INR      ECHOCARDIOGRAM  No results found for this or any previous visit (from the past 8760 hour(s)).      No orders of the defined types were placed in this encounter.    No orders of the defined types were placed in this encounter.    There are no discontinued medications.      Encounter Diagnoses   Name Primary?     Sick sinus syndrome (H)      Cardiac pacemaker in situ          CC  Shane Zuluaga MD  [address]  [place] [id number]

## 2022-02-22 ENCOUNTER — TELEPHONE (OUTPATIENT)
Dept: CARDIOLOGY | Facility: CLINIC | Age: 87
End: 2022-02-22
Payer: MEDICARE

## 2022-02-22 NOTE — TELEPHONE ENCOUNTER
Remote pacemaker courtesy check scheduled for 3/22/22. Called patient daughter Jackie and updated on plan.       ----- Message -----  From: Alberto Worthington MD  Sent: 2/21/2022   2:27 PM CST  To: [facility] Heart Ep Nurse    Patient RV lead threshold was slightly elevated this month and she is pacemaker dependent.  Can we recheck lead threshold next month to make sure we follow the trend.  Thank you.    Alberto

## 2022-02-24 DIAGNOSIS — C90.00 MULTIPLE MYELOMA NOT HAVING ACHIEVED REMISSION (H): ICD-10-CM

## 2022-02-24 RX ORDER — DEXAMETHASONE 4 MG/1
4 TABLET ORAL WEEKLY
Qty: 3 TABLET | Refills: 1 | Status: SHIPPED | OUTPATIENT
Start: 2022-02-24 | End: 2022-04-07

## 2022-03-01 ENCOUNTER — OFFICE VISIT (OUTPATIENT)
Dept: FAMILY MEDICINE | Facility: CLINIC | Age: 87
End: 2022-03-01
Payer: MEDICARE

## 2022-03-01 VITALS
DIASTOLIC BLOOD PRESSURE: 76 MMHG | OXYGEN SATURATION: 95 % | WEIGHT: 141 LBS | HEIGHT: 62 IN | BODY MASS INDEX: 25.95 KG/M2 | HEART RATE: 84 BPM | TEMPERATURE: 97.7 F | RESPIRATION RATE: 16 BRPM | SYSTOLIC BLOOD PRESSURE: 124 MMHG

## 2022-03-01 DIAGNOSIS — E05.20 TOXIC MULTINODUL GOITER: Primary | ICD-10-CM

## 2022-03-01 DIAGNOSIS — C79.51 SECONDARY MALIGNANT NEOPLASM OF BONE (H): ICD-10-CM

## 2022-03-01 DIAGNOSIS — D70.9 NEUTROPENIA, UNSPECIFIED TYPE (H): ICD-10-CM

## 2022-03-01 DIAGNOSIS — I50.32 CHRONIC DIASTOLIC CONGESTIVE HEART FAILURE (H): ICD-10-CM

## 2022-03-01 DIAGNOSIS — R11.0 NAUSEA: ICD-10-CM

## 2022-03-01 DIAGNOSIS — N18.30 STAGE 3 CHRONIC KIDNEY DISEASE, UNSPECIFIED WHETHER STAGE 3A OR 3B CKD (H): ICD-10-CM

## 2022-03-01 PROBLEM — E04.2 NON-TOXIC MULTINODULAR GOITER: Status: RESOLVED | Noted: 2022-02-14 | Resolved: 2022-03-01

## 2022-03-01 PROCEDURE — 99214 OFFICE O/P EST MOD 30 MIN: CPT | Performed by: INTERNAL MEDICINE

## 2022-03-01 RX ORDER — ONDANSETRON 8 MG/1
8 TABLET, FILM COATED ORAL EVERY 8 HOURS PRN
Qty: 30 TABLET | Refills: 11 | Status: SHIPPED | OUTPATIENT
Start: 2022-03-01 | End: 2022-05-17

## 2022-03-01 RX ORDER — FAMOTIDINE 40 MG/1
40 TABLET, FILM COATED ORAL 2 TIMES DAILY
Qty: 180 TABLET | Refills: 3 | Status: SHIPPED | OUTPATIENT
Start: 2022-03-01 | End: 2023-05-25

## 2022-03-01 ASSESSMENT — PAIN SCALES - GENERAL: PAINLEVEL: NO PAIN (0)

## 2022-03-01 NOTE — PROGRESS NOTES
Assessment & Plan     Toxic multinodul goiter  This is the cause of her hyperthyroidism, she does have 1 large thyroid nodule that could be amenable to an FNA, but given all of her medical problems, I think that this could wait, plan to check thyroid function test on Friday when she has scheduled labs, dose methimazole accordingly, her CMP and CBC are being followed closely due to her myeloma  - TSH with free T4 reflex; Future    Secondary malignant neoplasm of bone (H)  Continue follow-up with oncology as directed for myeloma    Neutropenia, unspecified type (H)  Continue follow-up as directed with oncology for myeloma    Chronic diastolic congestive heart failure (H)  Currently, volume status appears to be well compensated    Stage 3 chronic kidney disease, unspecified whether stage 3a or 3b CKD (H)  Stable, monitor closely    Nausea  Start the Pepcid as directed previously, try a higher dose of Zofran for intermittent nausea not controlled by Pepcid, if this does not work well enough, consider PPI; her weight is actually up since last visit  - famotidine (PEPCID) 40 MG tablet; Take 1 tablet (40 mg) by mouth 2 times daily  - ondansetron (ZOFRAN) 8 MG tablet; Take 1 tablet (8 mg) by mouth every 8 hours as needed for nausea      21 minutes spent on the date of the encounter doing chart review, history and exam, documentation and further activities per the note        Return in about 1 month (around 4/1/2022) for recheck.   Patient instructed to return to clinic or contact us sooner if symptoms worsen or new symptoms develop.     César Chung MD  [facility] MAGALY Araya is a 86 year old who presents for the following health issues  accompanied by her Daughter-in-law.    HPI   Here to follow-up toxic multinodular goiter   She is feeling much better  Energy levels are improved  More interest in activities  She forgot to start taking famotidine as directed last visit  She did not think  "that ondansetron helped very much with her nausea  She did not have any side effects from it  She took a break from chemotherapy for her myeloma  She plans to have labs with Dr. Parry on Friday    Review of Systems         Objective    /76 (BP Location: Left arm, Patient Position: Sitting, Cuff Size: Adult Regular)   Pulse 84   Temp 97.7  F (36.5  C) (Temporal)   Resp 16   Ht 1.575 m (5' 2\")   Wt 64 kg (141 lb)   SpO2 95%   BMI 25.79 kg/m    Body mass index is 25.79 kg/m .  Physical Exam   General: Improved appearing elderly female in no acute distress.  Neck: No obvious palpable thyroid nodules or goiter  Cardiovascular: The heart has a regular rate and rhythm  Pulmonary: The lungs are clear to auscultation bilaterally, breathing is not labored  Neurological: Alert and oriented to person place and time, cranial nerves II to XII grossly intact, walks with a cane, normal deep tendon reflexes, no tremor  Mental State: Improved affect, well-groomed, normal mood                  "

## 2022-03-04 ENCOUNTER — LAB (OUTPATIENT)
Dept: INFUSION THERAPY | Facility: CLINIC | Age: 87
End: 2022-03-04
Attending: INTERNAL MEDICINE
Payer: MEDICARE

## 2022-03-04 DIAGNOSIS — C90.00 MULTIPLE MYELOMA NOT HAVING ACHIEVED REMISSION (H): ICD-10-CM

## 2022-03-04 DIAGNOSIS — E05.90 HYPERTHYROIDISM: ICD-10-CM

## 2022-03-04 LAB
ALBUMIN SERPL-MCNC: 3.6 G/DL (ref 3.4–5)
ALP SERPL-CCNC: 59 U/L (ref 40–150)
ALT SERPL W P-5'-P-CCNC: 15 U/L (ref 0–50)
ANION GAP SERPL CALCULATED.3IONS-SCNC: 4 MMOL/L (ref 3–14)
AST SERPL W P-5'-P-CCNC: 13 U/L (ref 0–45)
BILIRUB SERPL-MCNC: 0.7 MG/DL (ref 0.2–1.3)
BUN SERPL-MCNC: 20 MG/DL (ref 7–30)
CALCIUM SERPL-MCNC: 9.2 MG/DL (ref 8.5–10.1)
CHLORIDE BLD-SCNC: 107 MMOL/L (ref 94–109)
CO2 SERPL-SCNC: 29 MMOL/L (ref 20–32)
CREAT SERPL-MCNC: 0.82 MG/DL (ref 0.52–1.04)
GFR SERPL CREATININE-BSD FRML MDRD: 70 ML/MIN/1.73M2
GLUCOSE BLD-MCNC: 121 MG/DL (ref 70–99)
IGG SERPL-MCNC: 1473 MG/DL (ref 610–1616)
KAPPA LC FREE SER-MCNC: 88.5 MG/DL (ref 0.33–1.94)
KAPPA LC FREE/LAMBDA FREE SER NEPH: 210.71 {RATIO} (ref 0.26–1.65)
LAMBDA LC FREE SERPL-MCNC: 0.42 MG/DL (ref 0.57–2.63)
POTASSIUM BLD-SCNC: 3.9 MMOL/L (ref 3.4–5.3)
PROT SERPL-MCNC: 7.5 G/DL (ref 6.8–8.8)
SODIUM SERPL-SCNC: 140 MMOL/L (ref 133–144)
T4 FREE SERPL-MCNC: 0.83 NG/DL (ref 0.76–1.46)
TOTAL PROTEIN SERUM FOR ELP: 7.1 G/DL (ref 6.8–8.8)
TSH SERPL DL<=0.005 MIU/L-ACNC: 5.63 MU/L (ref 0.4–4)

## 2022-03-04 PROCEDURE — 85025 COMPLETE CBC W/AUTO DIFF WBC: CPT | Performed by: INTERNAL MEDICINE

## 2022-03-04 PROCEDURE — 84155 ASSAY OF PROTEIN SERUM: CPT | Performed by: INTERNAL MEDICINE

## 2022-03-04 PROCEDURE — 82784 ASSAY IGA/IGD/IGG/IGM EACH: CPT | Performed by: INTERNAL MEDICINE

## 2022-03-04 PROCEDURE — 83521 IG LIGHT CHAINS FREE EACH: CPT | Performed by: INTERNAL MEDICINE

## 2022-03-04 PROCEDURE — 84439 ASSAY OF FREE THYROXINE: CPT | Performed by: INTERNAL MEDICINE

## 2022-03-04 PROCEDURE — 82040 ASSAY OF SERUM ALBUMIN: CPT | Performed by: INTERNAL MEDICINE

## 2022-03-04 PROCEDURE — 36415 COLL VENOUS BLD VENIPUNCTURE: CPT

## 2022-03-04 PROCEDURE — 84165 PROTEIN E-PHORESIS SERUM: CPT | Mod: TC | Performed by: PATHOLOGY

## 2022-03-04 PROCEDURE — 84443 ASSAY THYROID STIM HORMONE: CPT | Performed by: INTERNAL MEDICINE

## 2022-03-04 PROCEDURE — 80053 COMPREHEN METABOLIC PANEL: CPT | Performed by: INTERNAL MEDICINE

## 2022-03-04 NOTE — PROGRESS NOTES
Medical Assistant Note:  Quiana Dunaway presents today for blood draw.    Patient seen by provider today: No.   present during visit today: Not Applicable.    Concerns: No Concerns.    Procedure:  Lab draw site: right hand, Needle type: bf, Gauge: 23.    Post Assessment:  Labs drawn without difficulty: Yes.    Discharge Plan:  Departure Mode: Ambulatory.    Face to Face Time: 5min.    Mariela Lyn, CMA

## 2022-03-08 ENCOUNTER — ONCOLOGY VISIT (OUTPATIENT)
Dept: ONCOLOGY | Facility: CLINIC | Age: 87
End: 2022-03-08
Attending: INTERNAL MEDICINE
Payer: MEDICARE

## 2022-03-08 VITALS
DIASTOLIC BLOOD PRESSURE: 71 MMHG | BODY MASS INDEX: 25.61 KG/M2 | HEART RATE: 77 BPM | SYSTOLIC BLOOD PRESSURE: 111 MMHG | RESPIRATION RATE: 16 BRPM | WEIGHT: 140 LBS | OXYGEN SATURATION: 96 %

## 2022-03-08 DIAGNOSIS — C90.00 MULTIPLE MYELOMA NOT HAVING ACHIEVED REMISSION (H): Primary | ICD-10-CM

## 2022-03-08 LAB
ALBUMIN SERPL ELPH-MCNC: 4.2 G/DL (ref 3.7–5.1)
ALPHA1 GLOB SERPL ELPH-MCNC: 0.3 G/DL (ref 0.2–0.4)
ALPHA2 GLOB SERPL ELPH-MCNC: 0.6 G/DL (ref 0.5–0.9)
B-GLOBULIN SERPL ELPH-MCNC: 0.6 G/DL (ref 0.6–1)
GAMMA GLOB SERPL ELPH-MCNC: 1.3 G/DL (ref 0.7–1.6)
M PROTEIN SERPL ELPH-MCNC: 1 G/DL
PROT PATTERN SERPL ELPH-IMP: ABNORMAL

## 2022-03-08 PROCEDURE — 84165 PROTEIN E-PHORESIS SERUM: CPT | Mod: 26 | Performed by: PATHOLOGY

## 2022-03-08 PROCEDURE — G0463 HOSPITAL OUTPT CLINIC VISIT: HCPCS

## 2022-03-08 PROCEDURE — 99215 OFFICE O/P EST HI 40 MIN: CPT | Performed by: INTERNAL MEDICINE

## 2022-03-08 RX ORDER — ZOLEDRONIC ACID 0.04 MG/ML
4 INJECTION, SOLUTION INTRAVENOUS ONCE
Status: CANCELLED | OUTPATIENT
Start: 2022-06-08 | End: 2022-04-08

## 2022-03-08 ASSESSMENT — PAIN SCALES - GENERAL: PAINLEVEL: NO PAIN (0)

## 2022-03-08 NOTE — PROGRESS NOTES
"Oncology Rooming Note    March 8, 2022 2:18 PM   Quiana Dunaway is a 86 year old female who presents for:    Chief Complaint   Patient presents with     Oncology Clinic Visit     Initial Vitals: There were no vitals taken for this visit. Estimated body mass index is 25.79 kg/m  as calculated from the following:    Height as of 3/1/22: 1.575 m (5' 2\").    Weight as of 3/1/22: 64 kg (141 lb). There is no height or weight on file to calculate BSA.  Data Unavailable Comment: Data Unavailable   No LMP recorded. Patient is postmenopausal.  Allergies reviewed: Yes  Medications reviewed: Yes    Medications: Medication refills not needed today.  Pharmacy name entered into [employer]:    [facility] - [place], [place] - [address]  [id number] - [place], [place] - [address] AT [facility]    Clinical concerns:  doctor was notified.      Mariela Lyn CMA            "

## 2022-03-08 NOTE — LETTER
"    3/8/2022         RE: Quiana Dunaway  [address] [facility] [address]        Dear Colleague,    Thank you for referring your patient, Quiana Dunaway, to the [facility]. Please see a copy of my visit note below.    Oncology Rooming Note    March 8, 2022 2:18 PM   Quiana Dunaway is a 86 year old female who presents for:    Chief Complaint   Patient presents with     Oncology Clinic Visit     Initial Vitals: There were no vitals taken for this visit. Estimated body mass index is 25.79 kg/m  as calculated from the following:    Height as of 3/1/22: 1.575 m (5' 2\").    Weight as of 3/1/22: 64 kg (141 lb). There is no height or weight on file to calculate BSA.  Data Unavailable Comment: Data Unavailable   No LMP recorded. Patient is postmenopausal.  Allergies reviewed: Yes  Medications reviewed: Yes    Medications: Medication refills not needed today.  Pharmacy name entered into [facility]:    [facility] - [place], [place] - [address] [id number] - [place], [place] - [address] AT [facility]    Clinical concerns:  doctor was notified.      Mariela Lyn CMA              Talked to patient and evaluated by me. We discussed the risks and benefits of receiving EVUSHELD, as well as alternative treatments. The Emergency Use Administration (EUA) was provided to the patient for review and an opportunity for questions was provided. Patient wishes to proceed with EVUSHELD.      HEMATOLOGY HISTORY: Mrs. Dunaway is a lady with multiple myeloma (IgG kappa). High risk, t(14;16).  1. On 05/01/2017, WBC of 3.4, hemoglobin of 10.2 and platelet of 154.  2. SPEP on 07/07/2017 revealed M-spike of 1.8.  3. Bone marrow biopsy on 07/12/2017 reveals kappa monotypic plasma cell population consistent with multiple myeloma.  Bone marrow is 70% cellular.  Plasma cell is 50-60%.     -There is gain of chromosome 1, 5, 9, 15, and 17. "  There is translocation 14;16.   4.  On 07/18/2017:  -M-spike of 1.9.   -Immunofixation reveals monoclonal IgG kappa.    -IgG level of 2970.  IgA of 15 and IgM of 175.    -Kappa free light chain of 67.7.  Lambda free light chain of 1.42.  Ratio of kappa to lambda of 47.71.    -Beta 2 microglobulin of 3.4.   5. PET scan on 07/24/2017 reveals several focal areas of increased FDG uptake consistent with multiple myeloma.  There is probable epithelial cyst in tail of the pancreas.  No associated abnormal FDG activity.  Left thyroid cysts or nodules without any FDG activity.  There is a 0.3 cm left upper lobe lung nodule.   6.  Velcade, Revlimid and dexamethasone between on 08/14/2017 and 04/30/2018. Velcade stopped.   -Revlimid and dexamethasone continued.  -Revlimid held between 12/27/2021 and 03/08/2022.  7. CT chest, abdomen, and pelvis on 12/30/2021 does not reveal any malignancy.     SUBJECTIVE:  Ms. Dunaway is an 86-year-old female with multiple myeloma. Revlimid has been on hold as patient was not feeling good. Patient was found to have Graves' disease and treated for it.      Patient is feeling better.  She is more energetic.  No headache.  No dizziness.  No chest pain.  No shortness of breath.  Appetite is better.  No urinary complaint.  No bleeding.  No fever or chills.  All other review of system is negative.     PHYSICAL EXAMINATION:   GENERAL:  Alert and oriented x 3.   VITAL SIGNS:  Reviewed.  ECOG PS of 1.     EYES:  No icterus.   THROAT:  No ulcer. No thrush.   NECK:  Supple. No lymphadenopathy. No thyromegaly.   AXILLAE:  No lymphadenopathy.   LUNGS:  Good air entry bilaterally.  No crackles or wheezing.   HEART:  Regular.  No murmur.   GI: Abdomen is soft.  Nontender. No mass. Difficult palpation because of her weight.  EXTREMITIES:  No pedal edema.  No calf swelling or tenderness.   SKIN:  No rash.     LABORATORY: CBC, CMP, TSH, free T4, SPEP, IgG level and free light chains reviewed.        ASSESSMENT:    1.  An 86-year-old female with multiple myeloma.   Disease is progressing as Revlimid is on hold.  2.  Weakness, decreased appetite and anhedonia, improving with improvement of Graves' disease.  3.  Graves' disease.  Improving.     PLAN:    1.  Patient overall is doing better.  Improvement in her condition is due to improvement of Graves' disease and also holding Revlimid.  She is not getting the side effect of Revlimid.    2.  Labs were reviewed with her.  Explained to her that myeloma is progressing as Revlimid has been on hold.  [employer] free light chain has increased significantly.    Discussed regarding resuming treatment for myeloma.  She is agreeable for it.  She will resume Revlimid 10 mg a day for 2 weeks on and 1 week off.  She will also take dexamethasone 4 mg weekly.  I am hoping her myeloma will respond.  If not, we will consider adding other agents.    3.  Discussed regarding Zometa.  That has been on hold.  She will continue it every 3 months.  She would like to take it next month which is fine with me.  She has not been having dental or jaw related symptoms. She will follow-up with dentist.    4.  For Graves' disease, she will follow up with her Dr. Chung.    5.  Patient is at high risk for COVID-19 infection.  Discussed with her regarding Evusheldl.  Benefit and side effects were reviewed.  Patient would like to take it Evusheld.  She does not have time today.  She will come in next few days and get it.    6.  Patient and her daughter had few questions which were all answered.  I will see her in 2 months.  In between she will see our nurse practitioner.    TOTAL VISIT TIME: 45 minutes.  Time spent in today's visit, review of chart/investigations today and documentation today.      Again, thank you for allowing me to participate in the care of your patient.        Sincerely,        Alex Parry MD

## 2022-03-08 NOTE — PROGRESS NOTES
Talked to patient and evaluated by me. We discussed the risks and benefits of receiving EVUSHELD, as well as alternative treatments. The Emergency Use Administration (EUA) was provided to the patient for review and an opportunity for questions was provided. Patient wishes to proceed with EVUSHELD.

## 2022-03-08 NOTE — PATIENT INSTRUCTIONS
1. Start revlimid and dexamethasone.  2. Resume zometa next month.  3. Ses Juaquin Manish when she comes for zometa next month.  4. See me in 2 months.  5. Labs as per treatment protocol.  6. Evusheld injection. Scheduled 3/10/22    Please call to schedule

## 2022-03-09 ENCOUNTER — TELEPHONE (OUTPATIENT)
Dept: FAMILY MEDICINE | Facility: CLINIC | Age: 87
End: 2022-03-09
Payer: MEDICARE

## 2022-03-09 DIAGNOSIS — E05.20 TOXIC MULTINODUL GOITER: Primary | ICD-10-CM

## 2022-03-09 DIAGNOSIS — C90.00 MULTIPLE MYELOMA NOT HAVING ACHIEVED REMISSION (H): Primary | ICD-10-CM

## 2022-03-09 DIAGNOSIS — E05.90: ICD-10-CM

## 2022-03-09 RX ORDER — METHIMAZOLE 5 MG/1
2.5 TABLET ORAL DAILY
Qty: 45 TABLET | Refills: 3 | Status: SHIPPED | OUTPATIENT
Start: 2022-03-09 | End: 2022-07-19

## 2022-03-09 RX ORDER — LENALIDOMIDE 10 MG/1
10 CAPSULE ORAL DAILY
Qty: 14 CAPSULE | Refills: 0 | Status: SHIPPED | OUTPATIENT
Start: 2022-03-09 | End: 2022-04-07

## 2022-03-09 NOTE — TELEPHONE ENCOUNTER
"Can you please call Jenny or her daughter-in-law and make sure that she got my MyChart message and that she reduces her methimazole dose as outlined below?    \"Katherin Araya,     The blood test that you had drawn with Dr. Parry suggest that your thyroid levels are getting a little bit too low.  Therefore, we should reduce the dose of methimazole that you are taking.  I recommend that you cut the tablets in half and take one-half of a 5 mg tablet once daily (2.5 mg once daily).  We should recheck your thyroid blood tests in 4 to 6 weeks.  This could be accomplished when you have routine blood work with the oncology clinic.  I have added the orders for the blood tests.     Sincerely,     César Chung MD\"     "

## 2022-03-09 NOTE — PROGRESS NOTES
HEMATOLOGY HISTORY: Mrs. Dunaway is a lady with multiple myeloma (IgG kappa). High risk, t(14;16).  1. On 05/01/2017, WBC of 3.4, hemoglobin of 10.2 and platelet of 154.  2. SPEP on 07/07/2017 revealed M-spike of 1.8.  3. Bone marrow biopsy on 07/12/2017 reveals kappa monotypic plasma cell population consistent with multiple myeloma.  Bone marrow is 70% cellular.  Plasma cell is 50-60%.     -There is gain of chromosome 1, 5, 9, 15, and 17.  There is translocation 14;16.   4.  On 07/18/2017:  -M-spike of 1.9.   -Immunofixation reveals monoclonal IgG kappa.    -IgG level of 2970.  IgA of 15 and IgM of 175.    -Kappa free light chain of 67.7.  Lambda free light chain of 1.42.  Ratio of kappa to lambda of 47.71.    -Beta 2 microglobulin of 3.4.   5. PET scan on 07/24/2017 reveals several focal areas of increased FDG uptake consistent with multiple myeloma.  There is probable epithelial cyst in tail of the pancreas.  No associated abnormal FDG activity.  Left thyroid cysts or nodules without any FDG activity.  There is a 0.3 cm left upper lobe lung nodule.   6.  Velcade, Revlimid and dexamethasone between on 08/14/2017 and 04/30/2018. Velcade stopped.   -Revlimid and dexamethasone continued.  -Revlimid held between 12/27/2021 and 03/08/2022.  7. CT chest, abdomen, and pelvis on 12/30/2021 does not reveal any malignancy.     SUBJECTIVE:  Ms. Dunaway is an 86-year-old female with multiple myeloma. Revlimid has been on hold as patient was not feeling good. Patient was found to have Graves' disease and treated for it.      Patient is feeling better.  She is more energetic.  No headache.  No dizziness.  No chest pain.  No shortness of breath.  Appetite is better.  No urinary complaint.  No bleeding.  No fever or chills.  All other review of system is negative.     PHYSICAL EXAMINATION:   GENERAL:  Alert and oriented x 3.   VITAL SIGNS:  Reviewed.  ECOG PS of 1.     EYES:  No icterus.   THROAT:  No ulcer. No thrush.   NECK:   Supple. No lymphadenopathy. No thyromegaly.   AXILLAE:  No lymphadenopathy.   LUNGS:  Good air entry bilaterally.  No crackles or wheezing.   HEART:  Regular.  No murmur.   GI: Abdomen is soft.  Nontender. No mass. Difficult palpation because of her weight.  EXTREMITIES:  No pedal edema.  No calf swelling or tenderness.   SKIN:  No rash.     LABORATORY: CBC, CMP, TSH, free T4, SPEP, IgG level and free light chains reviewed.       ASSESSMENT:    1.  An 86-year-old female with multiple myeloma.   Disease is progressing as Revlimid is on hold.  2.  Weakness, decreased appetite and anhedonia, improving with improvement of Graves' disease.  3.  Graves' disease.  Improving.     PLAN:    1.  Patient overall is doing better.  Improvement in her condition is due to improvement of Graves' disease and also holding Revlimid.  She is not getting the side effect of Revlimid.    2.  Labs were reviewed with her.  Explained to her that myeloma is progressing as Revlimid has been on hold.  [place] free light chain has increased significantly.    Discussed regarding resuming treatment for myeloma.  She is agreeable for it.  She will resume Revlimid 10 mg a day for 2 weeks on and 1 week off.  She will also take dexamethasone 4 mg weekly.  I am hoping her myeloma will respond.  If not, we will consider adding other agents.    3.  Discussed regarding Zometa.  That has been on hold.  She will continue it every 3 months.  She would like to take it next month which is fine with me.  She has not been having dental or jaw related symptoms. She will follow-up with dentist.    4.  For Graves' disease, she will follow up with her Dr. Chung.    5.  Patient is at high risk for COVID-19 infection.  Discussed with her regarding Evusheldl.  Benefit and side effects were reviewed.  Patient would like to take it Evusheld.  She does not have time today.  She will come in next few days and get it.    6.  Patient and her daughter had few questions which  were all answered.  I will see her in 2 months.  In between she will see our nurse practitioner.    TOTAL VISIT TIME: 45 minutes.  Time spent in today's visit, review of chart/investigations today and documentation today.

## 2022-03-10 ENCOUNTER — INFUSION THERAPY VISIT (OUTPATIENT)
Dept: INFUSION THERAPY | Facility: CLINIC | Age: 87
End: 2022-03-10
Attending: INTERNAL MEDICINE
Payer: MEDICARE

## 2022-03-10 ENCOUNTER — TELEPHONE (OUTPATIENT)
Dept: PHARMACY | Facility: CLINIC | Age: 87
End: 2022-03-10
Payer: MEDICARE

## 2022-03-10 VITALS
DIASTOLIC BLOOD PRESSURE: 76 MMHG | RESPIRATION RATE: 18 BRPM | TEMPERATURE: 98.4 F | SYSTOLIC BLOOD PRESSURE: 144 MMHG | HEART RATE: 89 BPM

## 2022-03-10 DIAGNOSIS — C90.00 MULTIPLE MYELOMA NOT HAVING ACHIEVED REMISSION (H): Primary | ICD-10-CM

## 2022-03-10 PROCEDURE — M0220 HC INJECTION TIXAGEVIMAB & CILGAVIMAB (EVUSHELD): HCPCS

## 2022-03-10 PROCEDURE — 258N000003 HC RX IP 258 OP 636: Performed by: INTERNAL MEDICINE

## 2022-03-10 PROCEDURE — 250N000011 HC RX IP 250 OP 636: Performed by: INTERNAL MEDICINE

## 2022-03-10 PROCEDURE — 96372 THER/PROPH/DIAG INJ SC/IM: CPT | Performed by: INTERNAL MEDICINE

## 2022-03-10 PROCEDURE — 96365 THER/PROPH/DIAG IV INF INIT: CPT

## 2022-03-10 RX ORDER — ZOLEDRONIC ACID 0.04 MG/ML
4 INJECTION, SOLUTION INTRAVENOUS ONCE
Status: DISCONTINUED | OUTPATIENT
Start: 2022-03-10 | End: 2022-03-10

## 2022-03-10 RX ADMIN — ZOLEDRONIC ACID 3.3 MG: 4 INJECTION, SOLUTION, CONCENTRATE INTRAVENOUS at 10:51

## 2022-03-10 RX ADMIN — Medication: at 11:07

## 2022-03-10 ASSESSMENT — PAIN SCALES - GENERAL: PAINLEVEL: NO PAIN (0)

## 2022-03-10 NOTE — TELEPHONE ENCOUNTER
Oral Chemotherapy Monitoring Program   Medication: Revlimid  Rx: 10mg PO daily on days 1 through 14 of 21 day cycle   Auth #: [id number]  Obtained on: 3/10/2022  Risk Category: adult female of non child bearing potential  Routine survey questions reviewed.   Rx to be Escribed to [facility]     Terrie Bowen [facility] Oncology Pharmacy Liaison  [phone]

## 2022-03-10 NOTE — PROGRESS NOTES
Infusion Nursing Note:  Quiana Dunaway presents today for Evusheld and zometa.    Patient seen by provider today: No   present during visit today: Not Applicable.    Note: EVUSHELD Administration Note:  Quiana NAA Emelyn presents today for EVUSHELD.   present during visit today: Not Applicable.    Consent:   Informed Consent confirmed in chart, obtained on this date: 3/8/22    Post Injection Assessment:   Patient tolerated injection without incident.  Patient observed for 60 minutes post Evusheld per protocol.      Patient was observed in the room for a minimum of 10 minutes after injection per standard, then remained in the buidling for a total 60 minute observation period.         Discharge Plan:    Discharge instructions reviewed with: Patient.  Patient and/or family verbalized understanding of discharge instructions and all questions answered.  Patient discharged in stable condition accompanied by: self.  Departure Mode: Ambulatory.       Intravenous Access:  Peripheral IV placed.    Treatment Conditions:  Lab Results   Component Value Date     03/04/2022    POTASSIUM 3.9 03/04/2022    MAG 2.4 (H) 05/26/2020    CR 0.82 03/04/2022    HUMBERTO 9.2 03/04/2022    BILITOTAL 0.7 03/04/2022    ALBUMIN 3.6 03/04/2022    ALT 15 03/04/2022    AST 13 03/04/2022     Results reviewed, labs MET treatment parameters, ok to proceed with treatment.      Post Infusion Assessment:  Patient tolerated infusion without incident.  No evidence of extravasations.  Access discontinued per protocol.       Discharge Plan:   Discharge instructions reviewed with: Patient.  Patient and/or family verbalized understanding of discharge instructions and all questions answered.  Patient discharged in stable condition accompanied by: self.  Departure Mode: Ambulatory.      Suzan Ortega RN

## 2022-03-11 NOTE — TELEPHONE ENCOUNTER
Patient viewed [place]:    Last read by Quiana Dunaway at 6:58 AM on 3/10/2022.    Brain Walter RN  [facility]

## 2022-03-22 ENCOUNTER — ANCILLARY PROCEDURE (OUTPATIENT)
Dept: CARDIOLOGY | Facility: CLINIC | Age: 87
End: 2022-03-22
Attending: INTERNAL MEDICINE
Payer: MEDICARE

## 2022-03-22 ENCOUNTER — PATIENT OUTREACH (OUTPATIENT)
Dept: ONCOLOGY | Facility: CLINIC | Age: 87
End: 2022-03-22

## 2022-03-22 DIAGNOSIS — C90.00 MULTIPLE MYELOMA NOT HAVING ACHIEVED REMISSION (H): ICD-10-CM

## 2022-03-22 DIAGNOSIS — I49.5 SICK SINUS SYNDROME (H): ICD-10-CM

## 2022-03-22 DIAGNOSIS — Z95.0 CARDIAC PACEMAKER IN SITU: ICD-10-CM

## 2022-03-22 RX ORDER — LENALIDOMIDE 10 MG/1
10 CAPSULE ORAL DAILY
Qty: 14 CAPSULE | Refills: 0 | Status: SHIPPED | OUTPATIENT
Start: 2022-03-22 | End: 2022-06-15

## 2022-03-22 RX ORDER — DEXAMETHASONE 4 MG/1
4 TABLET ORAL WEEKLY
Qty: 3 TABLET | Refills: 0 | Status: SHIPPED | OUTPATIENT
Start: 2022-03-22 | End: 2022-06-15

## 2022-03-22 NOTE — PROGRESS NOTES
Writer received a fax from  [facility] requesting new script for Acyclovir.400mg.    Alecia Briggs RN

## 2022-03-23 RX ORDER — ACYCLOVIR 400 MG/1
400 TABLET ORAL 2 TIMES DAILY
Qty: 60 TABLET | Refills: 5 | Status: SHIPPED | OUTPATIENT
Start: 2022-03-23 | End: 2022-09-27

## 2022-03-24 ENCOUNTER — TELEPHONE (OUTPATIENT)
Dept: PHARMACY | Facility: CLINIC | Age: 87
End: 2022-03-24
Payer: MEDICARE

## 2022-03-24 DIAGNOSIS — I10 BENIGN ESSENTIAL HYPERTENSION: ICD-10-CM

## 2022-03-24 NOTE — TELEPHONE ENCOUNTER
Oral Chemotherapy Monitoring Program   Medication: Revlimid  Rx: 10mg PO daily on days 1 through 14 of 21 day cycle   Auth #: [id number]   Obtained on: 3/23/2022  Risk Category: adult female of non child bearing potential  Routine survey questions reviewed.   Rx to be Escribed to [facility]     Terrie Bowen [facility] Oncology Pharmacy Liaison  [phone]

## 2022-03-25 ENCOUNTER — TELEPHONE (OUTPATIENT)
Dept: ONCOLOGY | Facility: CLINIC | Age: 87
End: 2022-03-25
Payer: MEDICARE

## 2022-03-25 RX ORDER — LISINOPRIL 5 MG/1
5 TABLET ORAL DAILY
Qty: 90 TABLET | Refills: 1 | Status: SHIPPED | OUTPATIENT
Start: 2022-03-25 | End: 2022-09-21

## 2022-03-25 NOTE — TELEPHONE ENCOUNTER
"Per Dr. Parry, \"OK for dental evaluation.\"    Stacia Roque, RN  Triage Nurse Advisor  [facility]  "

## 2022-03-25 NOTE — TELEPHONE ENCOUNTER
Routing refill request to provider for review/approval because:  Failed BP protocol    Nkechi MENDIOLA RN  EP Triage

## 2022-03-25 NOTE — TELEPHONE ENCOUNTER
Patient's daughter-in-law, Regi, called to report that Quiana woke up with a missing tooth. Regi and Quiana don't know if it was loose or what caused it to fall out. Regi states that Quiana already has a lot of teeth missing and her remaining ones are in very bad shape. Quiana has an appointment at noon today with her dentist to be evaluated and make sure there are no signs of infection where the tooth fell out. Quiana wanted to make sure that it was ok to go to the appointment since Quiana has been on Zometa. Last dose was on 3/10/22.    Advised Regi that Quiana should proceed with the appointment since the tooth already fell out and she should be evaluated. Regi verbalized understanding.    Will route to care team for update.    Stacia Roque, RN  Triage Nurse Advisor  [facility]

## 2022-03-31 LAB
MDC_IDC_LEAD_IMPLANT_DT: NORMAL
MDC_IDC_LEAD_IMPLANT_DT: NORMAL
MDC_IDC_LEAD_LOCATION: NORMAL
MDC_IDC_LEAD_LOCATION: NORMAL
MDC_IDC_LEAD_LOCATION_DETAIL_1: NORMAL
MDC_IDC_LEAD_LOCATION_DETAIL_1: NORMAL
MDC_IDC_LEAD_MFG: NORMAL
MDC_IDC_LEAD_MFG: NORMAL
MDC_IDC_LEAD_MODEL: NORMAL
MDC_IDC_LEAD_MODEL: NORMAL
MDC_IDC_LEAD_POLARITY_TYPE: NORMAL
MDC_IDC_LEAD_POLARITY_TYPE: NORMAL
MDC_IDC_LEAD_SERIAL: NORMAL
MDC_IDC_LEAD_SERIAL: NORMAL
MDC_IDC_MSMT_BATTERY_DTM: NORMAL
MDC_IDC_MSMT_BATTERY_REMAINING_LONGEVITY: 30 MO
MDC_IDC_MSMT_BATTERY_RRT_TRIGGER: 2.83
MDC_IDC_MSMT_BATTERY_STATUS: NORMAL
MDC_IDC_MSMT_BATTERY_VOLTAGE: 2.97 V
MDC_IDC_MSMT_LEADCHNL_RA_IMPEDANCE_VALUE: 342 OHM
MDC_IDC_MSMT_LEADCHNL_RA_IMPEDANCE_VALUE: 361 OHM
MDC_IDC_MSMT_LEADCHNL_RA_PACING_THRESHOLD_AMPLITUDE: 0.38 V
MDC_IDC_MSMT_LEADCHNL_RA_PACING_THRESHOLD_PULSEWIDTH: 0.4 MS
MDC_IDC_MSMT_LEADCHNL_RA_SENSING_INTR_AMPL: 1.62 MV
MDC_IDC_MSMT_LEADCHNL_RA_SENSING_INTR_AMPL: 1.62 MV
MDC_IDC_MSMT_LEADCHNL_RV_IMPEDANCE_VALUE: 399 OHM
MDC_IDC_MSMT_LEADCHNL_RV_IMPEDANCE_VALUE: 513 OHM
MDC_IDC_MSMT_LEADCHNL_RV_PACING_THRESHOLD_AMPLITUDE: 2.12 V
MDC_IDC_MSMT_LEADCHNL_RV_PACING_THRESHOLD_PULSEWIDTH: 0.4 MS
MDC_IDC_MSMT_LEADCHNL_RV_SENSING_INTR_AMPL: 5 MV
MDC_IDC_MSMT_LEADCHNL_RV_SENSING_INTR_AMPL: 5 MV
MDC_IDC_PG_IMPLANT_DTM: NORMAL
MDC_IDC_PG_MFG: NORMAL
MDC_IDC_PG_MODEL: NORMAL
MDC_IDC_PG_SERIAL: NORMAL
MDC_IDC_PG_TYPE: NORMAL
MDC_IDC_SESS_CLINIC_NAME: NORMAL
MDC_IDC_SESS_DTM: NORMAL
MDC_IDC_SESS_TYPE: NORMAL
MDC_IDC_SET_BRADY_AT_MODE_SWITCH_RATE: 171 {BEATS}/MIN
MDC_IDC_SET_BRADY_HYSTRATE: NORMAL
MDC_IDC_SET_BRADY_LOWRATE: 60 {BEATS}/MIN
MDC_IDC_SET_BRADY_MAX_SENSOR_RATE: 120 {BEATS}/MIN
MDC_IDC_SET_BRADY_MAX_TRACKING_RATE: 120 {BEATS}/MIN
MDC_IDC_SET_BRADY_MODE: NORMAL
MDC_IDC_SET_BRADY_PAV_DELAY_LOW: 300 MS
MDC_IDC_SET_BRADY_SAV_DELAY_LOW: 300 MS
MDC_IDC_SET_LEADCHNL_RA_PACING_AMPLITUDE: 1.5 V
MDC_IDC_SET_LEADCHNL_RA_PACING_ANODE_ELECTRODE_1: NORMAL
MDC_IDC_SET_LEADCHNL_RA_PACING_ANODE_LOCATION_1: NORMAL
MDC_IDC_SET_LEADCHNL_RA_PACING_CAPTURE_MODE: NORMAL
MDC_IDC_SET_LEADCHNL_RA_PACING_CATHODE_ELECTRODE_1: NORMAL
MDC_IDC_SET_LEADCHNL_RA_PACING_CATHODE_LOCATION_1: NORMAL
MDC_IDC_SET_LEADCHNL_RA_PACING_POLARITY: NORMAL
MDC_IDC_SET_LEADCHNL_RA_PACING_PULSEWIDTH: 0.4 MS
MDC_IDC_SET_LEADCHNL_RA_SENSING_ANODE_ELECTRODE_1: NORMAL
MDC_IDC_SET_LEADCHNL_RA_SENSING_ANODE_LOCATION_1: NORMAL
MDC_IDC_SET_LEADCHNL_RA_SENSING_CATHODE_ELECTRODE_1: NORMAL
MDC_IDC_SET_LEADCHNL_RA_SENSING_CATHODE_LOCATION_1: NORMAL
MDC_IDC_SET_LEADCHNL_RA_SENSING_POLARITY: NORMAL
MDC_IDC_SET_LEADCHNL_RA_SENSING_SENSITIVITY: 0.3 MV
MDC_IDC_SET_LEADCHNL_RV_PACING_AMPLITUDE: 5 V
MDC_IDC_SET_LEADCHNL_RV_PACING_ANODE_ELECTRODE_1: NORMAL
MDC_IDC_SET_LEADCHNL_RV_PACING_ANODE_LOCATION_1: NORMAL
MDC_IDC_SET_LEADCHNL_RV_PACING_CAPTURE_MODE: NORMAL
MDC_IDC_SET_LEADCHNL_RV_PACING_CATHODE_ELECTRODE_1: NORMAL
MDC_IDC_SET_LEADCHNL_RV_PACING_CATHODE_LOCATION_1: NORMAL
MDC_IDC_SET_LEADCHNL_RV_PACING_POLARITY: NORMAL
MDC_IDC_SET_LEADCHNL_RV_PACING_PULSEWIDTH: 0.4 MS
MDC_IDC_SET_LEADCHNL_RV_SENSING_ANODE_ELECTRODE_1: NORMAL
MDC_IDC_SET_LEADCHNL_RV_SENSING_ANODE_LOCATION_1: NORMAL
MDC_IDC_SET_LEADCHNL_RV_SENSING_CATHODE_ELECTRODE_1: NORMAL
MDC_IDC_SET_LEADCHNL_RV_SENSING_CATHODE_LOCATION_1: NORMAL
MDC_IDC_SET_LEADCHNL_RV_SENSING_POLARITY: NORMAL
MDC_IDC_SET_LEADCHNL_RV_SENSING_SENSITIVITY: 0.9 MV
MDC_IDC_SET_ZONE_DETECTION_INTERVAL: 350 MS
MDC_IDC_SET_ZONE_DETECTION_INTERVAL: 400 MS
MDC_IDC_SET_ZONE_TYPE: NORMAL
MDC_IDC_STAT_AT_BURDEN_PERCENT: 0 %
MDC_IDC_STAT_AT_DTM_END: NORMAL
MDC_IDC_STAT_AT_DTM_START: NORMAL
MDC_IDC_STAT_BRADY_AP_VP_PERCENT: 68.83 %
MDC_IDC_STAT_BRADY_AP_VS_PERCENT: 0.02 %
MDC_IDC_STAT_BRADY_AS_VP_PERCENT: 31.14 %
MDC_IDC_STAT_BRADY_AS_VS_PERCENT: 0.01 %
MDC_IDC_STAT_BRADY_DTM_END: NORMAL
MDC_IDC_STAT_BRADY_DTM_START: NORMAL
MDC_IDC_STAT_BRADY_RA_PERCENT_PACED: 68.74 %
MDC_IDC_STAT_BRADY_RV_PERCENT_PACED: 99.97 %
MDC_IDC_STAT_EPISODE_RECENT_COUNT: 0
MDC_IDC_STAT_EPISODE_RECENT_COUNT_DTM_END: NORMAL
MDC_IDC_STAT_EPISODE_RECENT_COUNT_DTM_START: NORMAL
MDC_IDC_STAT_EPISODE_TOTAL_COUNT: 0
MDC_IDC_STAT_EPISODE_TOTAL_COUNT: 0
MDC_IDC_STAT_EPISODE_TOTAL_COUNT: 11
MDC_IDC_STAT_EPISODE_TOTAL_COUNT: 22
MDC_IDC_STAT_EPISODE_TOTAL_COUNT_DTM_END: NORMAL
MDC_IDC_STAT_EPISODE_TOTAL_COUNT_DTM_START: NORMAL
MDC_IDC_STAT_EPISODE_TYPE: NORMAL

## 2022-04-04 ENCOUNTER — OFFICE VISIT (OUTPATIENT)
Dept: FAMILY MEDICINE | Facility: CLINIC | Age: 87
End: 2022-04-04
Payer: MEDICARE

## 2022-04-04 VITALS
TEMPERATURE: 97.5 F | WEIGHT: 141 LBS | DIASTOLIC BLOOD PRESSURE: 71 MMHG | OXYGEN SATURATION: 96 % | HEIGHT: 62 IN | BODY MASS INDEX: 25.95 KG/M2 | RESPIRATION RATE: 18 BRPM | SYSTOLIC BLOOD PRESSURE: 127 MMHG | HEART RATE: 97 BPM

## 2022-04-04 DIAGNOSIS — E05.20 TOXIC MULTINODUL GOITER: Primary | ICD-10-CM

## 2022-04-04 DIAGNOSIS — Z23 HIGH PRIORITY FOR 2019-NCOV VACCINE: ICD-10-CM

## 2022-04-04 DIAGNOSIS — C90.00 MULTIPLE MYELOMA NOT HAVING ACHIEVED REMISSION (H): ICD-10-CM

## 2022-04-04 PROCEDURE — 99213 OFFICE O/P EST LOW 20 MIN: CPT | Performed by: INTERNAL MEDICINE

## 2022-04-04 PROCEDURE — 91306 COVID-19,PF,MODERNA (18+ YRS BOOSTER .25ML): CPT | Performed by: INTERNAL MEDICINE

## 2022-04-04 PROCEDURE — 0064A COVID-19,PF,MODERNA (18+ YRS BOOSTER .25ML): CPT | Performed by: INTERNAL MEDICINE

## 2022-04-04 ASSESSMENT — PAIN SCALES - GENERAL: PAINLEVEL: MODERATE PAIN (5)

## 2022-04-04 NOTE — PROGRESS NOTES
"  Assessment & Plan     Toxic multinodul goiter  Recheck thyroid function tests next lab draw with oncology  I will dose methimazole accordingly    Multiple myeloma not having achieved remission (H)  Continue follow-up with oncology as directed    High priority for 2019-nCoV vaccine  Get COVID fourth shot  - COVID-19,PF,MODERNA (18+ Yrs BOOSTER .25mL)    I think she should follow her dentist's recommendation regarding her tooth extraction    20 minutes spent on the date of the encounter doing chart review, history and exam, documentation and further activities per the note         Return in about 6 weeks (around 5/16/2022) for recheck.  Patient instructed to return to clinic or contact us sooner if symptoms worsen or new symptoms develop.     César Chung MD  [facility] MAGALY Araya is a 86 year old who presents for the following health issues  accompanied by her Daughter-in-law.    HPI     Pleasant 86-year-old female with history of multiple myeloma, toxic multi  nodular goiter  She is here to follow-up those conditions  She feels well but had some questions about getting a tooth removed  Her daughter-in-law is not sure if she is taking famotidine  She does not think that the ondansetron is helping enough    Review of Systems         Objective    /71 (BP Location: Right arm, Patient Position: Sitting, Cuff Size: Adult Regular)   Pulse 97   Temp 97.5  F (36.4  C) (Temporal)   Resp 18   Ht 1.575 m (5' 2\")   Wt 64 kg (141 lb)   SpO2 96%   BMI 25.79 kg/m    Body mass index is 25.79 kg/m .  Physical Exam   General: This is a stable appearing, comfortable appearing elderly female in no acute distress, her weight is stable                "

## 2022-04-04 NOTE — PATIENT INSTRUCTIONS
Check to make sure you are taking famotidine (Pepcid) 40 mg twice daily on scheduled basis.  If not, then you should start doing so.   If you are already taking famotidine (Pepcid) and it is not helping enough, then we should switch famotidine to a stronger acid-blocker known as omeprazole (Prilosec) 20 mg daily.  Inform me it that is the case.

## 2022-04-05 ENCOUNTER — TELEPHONE (OUTPATIENT)
Dept: ONCOLOGY | Facility: CLINIC | Age: 87
End: 2022-04-05
Payer: MEDICARE

## 2022-04-05 NOTE — TELEPHONE ENCOUNTER
Quiana's daughter in law, Regi is calling asking for a nurse to return a call to her.  Quiana lost a tooth about 10 days ago and dentist would like to extract the tooth.  Regi is wondering if this is ok to do.  Quiana is scheduled for a visit with Dr Parry on 4/7 and then Zometa on 4/8.  Regi thought that Quiana just got her Zometa last month.  I did look in the chart and noticed the zometa was documented in medications on 4/14/22 but no where else in her chart.  Is Quiana getting Zometa every 3 months?  Please call Regi back so she can schedule appointment with the dentist.  You can reach Regi at [phone].  Thanks,  Devika

## 2022-04-07 ENCOUNTER — LAB (OUTPATIENT)
Dept: INFUSION THERAPY | Facility: CLINIC | Age: 87
End: 2022-04-07
Attending: INTERNAL MEDICINE
Payer: MEDICARE

## 2022-04-07 ENCOUNTER — ONCOLOGY VISIT (OUTPATIENT)
Dept: ONCOLOGY | Facility: CLINIC | Age: 87
End: 2022-04-07
Attending: INTERNAL MEDICINE
Payer: MEDICARE

## 2022-04-07 VITALS
DIASTOLIC BLOOD PRESSURE: 83 MMHG | HEART RATE: 91 BPM | SYSTOLIC BLOOD PRESSURE: 141 MMHG | BODY MASS INDEX: 25.42 KG/M2 | RESPIRATION RATE: 16 BRPM | OXYGEN SATURATION: 96 % | WEIGHT: 139 LBS

## 2022-04-07 DIAGNOSIS — E05.20 TOXIC MULTINODUL GOITER: ICD-10-CM

## 2022-04-07 DIAGNOSIS — C90.00 MULTIPLE MYELOMA NOT HAVING ACHIEVED REMISSION (H): Primary | ICD-10-CM

## 2022-04-07 LAB
ALBUMIN SERPL-MCNC: 3.5 G/DL (ref 3.4–5)
ALP SERPL-CCNC: 64 U/L (ref 40–150)
ALT SERPL W P-5'-P-CCNC: 21 U/L (ref 0–50)
ANION GAP SERPL CALCULATED.3IONS-SCNC: 3 MMOL/L (ref 3–14)
AST SERPL W P-5'-P-CCNC: 13 U/L (ref 0–45)
BASOPHILS # BLD AUTO: 0 10E3/UL (ref 0–0.2)
BASOPHILS NFR BLD AUTO: 1 %
BILIRUB SERPL-MCNC: 0.5 MG/DL (ref 0.2–1.3)
BUN SERPL-MCNC: 19 MG/DL (ref 7–30)
CALCIUM SERPL-MCNC: 9.4 MG/DL (ref 8.5–10.1)
CHLORIDE BLD-SCNC: 105 MMOL/L (ref 94–109)
CO2 SERPL-SCNC: 31 MMOL/L (ref 20–32)
CREAT SERPL-MCNC: 0.85 MG/DL (ref 0.52–1.04)
EOSINOPHIL # BLD AUTO: 0.2 10E3/UL (ref 0–0.7)
EOSINOPHIL NFR BLD AUTO: 5 %
ERYTHROCYTE [DISTWIDTH] IN BLOOD BY AUTOMATED COUNT: 13.7 % (ref 10–15)
GFR SERPL CREATININE-BSD FRML MDRD: 67 ML/MIN/1.73M2
GLUCOSE BLD-MCNC: 87 MG/DL (ref 70–99)
HCT VFR BLD AUTO: 33.4 % (ref 35–47)
HGB BLD-MCNC: 11 G/DL (ref 11.7–15.7)
IMM GRANULOCYTES # BLD: 0 10E3/UL
IMM GRANULOCYTES NFR BLD: 1 %
LYMPHOCYTES # BLD AUTO: 1.2 10E3/UL (ref 0.8–5.3)
LYMPHOCYTES NFR BLD AUTO: 35 %
MCH RBC QN AUTO: 33.2 PG (ref 26.5–33)
MCHC RBC AUTO-ENTMCNC: 32.9 G/DL (ref 31.5–36.5)
MCV RBC AUTO: 101 FL (ref 78–100)
MONOCYTES # BLD AUTO: 0.4 10E3/UL (ref 0–1.3)
MONOCYTES NFR BLD AUTO: 11 %
NEUTROPHILS # BLD AUTO: 1.6 10E3/UL (ref 1.6–8.3)
NEUTROPHILS NFR BLD AUTO: 47 %
NRBC # BLD AUTO: 0 10E3/UL
NRBC BLD AUTO-RTO: 0 /100
PLATELET # BLD AUTO: 160 10E3/UL (ref 150–450)
POTASSIUM BLD-SCNC: 3.5 MMOL/L (ref 3.4–5.3)
PROT SERPL-MCNC: 7.2 G/DL (ref 6.8–8.8)
RBC # BLD AUTO: 3.31 10E6/UL (ref 3.8–5.2)
SODIUM SERPL-SCNC: 139 MMOL/L (ref 133–144)
TOTAL PROTEIN SERUM FOR ELP: 6.9 G/DL (ref 6.8–8.8)
TSH SERPL DL<=0.005 MIU/L-ACNC: 2.76 MU/L (ref 0.4–4)
WBC # BLD AUTO: 3.3 10E3/UL (ref 4–11)

## 2022-04-07 PROCEDURE — 36415 COLL VENOUS BLD VENIPUNCTURE: CPT

## 2022-04-07 PROCEDURE — 84165 PROTEIN E-PHORESIS SERUM: CPT | Mod: TC | Performed by: STUDENT IN AN ORGANIZED HEALTH CARE EDUCATION/TRAINING PROGRAM

## 2022-04-07 PROCEDURE — 84155 ASSAY OF PROTEIN SERUM: CPT | Mod: 91 | Performed by: INTERNAL MEDICINE

## 2022-04-07 PROCEDURE — 83521 IG LIGHT CHAINS FREE EACH: CPT | Mod: 59 | Performed by: INTERNAL MEDICINE

## 2022-04-07 PROCEDURE — 85014 HEMATOCRIT: CPT | Performed by: INTERNAL MEDICINE

## 2022-04-07 PROCEDURE — 99214 OFFICE O/P EST MOD 30 MIN: CPT | Performed by: INTERNAL MEDICINE

## 2022-04-07 PROCEDURE — 82784 ASSAY IGA/IGD/IGG/IGM EACH: CPT | Performed by: INTERNAL MEDICINE

## 2022-04-07 PROCEDURE — 80053 COMPREHEN METABOLIC PANEL: CPT | Performed by: INTERNAL MEDICINE

## 2022-04-07 PROCEDURE — G0463 HOSPITAL OUTPT CLINIC VISIT: HCPCS

## 2022-04-07 PROCEDURE — 84443 ASSAY THYROID STIM HORMONE: CPT | Performed by: INTERNAL MEDICINE

## 2022-04-07 ASSESSMENT — PAIN SCALES - GENERAL: PAINLEVEL: NO PAIN (0)

## 2022-04-07 NOTE — LETTER
"    4/7/2022         RE: Quiana Dunaway  [address] [facility] [address]        Dear Colleague,    Thank you for referring your patient, Quiana Dunaway, to the [facility]. Please see a copy of my visit note below.    Oncology Rooming Note    April 7, 2022 1:56 PM   Quiana Dunaway is a 86 year old female who presents for:    Chief Complaint   Patient presents with     Oncology Clinic Visit     Initial Vitals: There were no vitals taken for this visit. Estimated body mass index is 25.79 kg/m  as calculated from the following:    Height as of 4/4/22: 1.575 m (5' 2\").    Weight as of 4/4/22: 64 kg (141 lb). There is no height or weight on file to calculate BSA.  Data Unavailable Comment: Data Unavailable   No LMP recorded. Patient is postmenopausal.  Allergies reviewed: Yes  Medications reviewed: Yes    Medications: Medication refills not needed today.  Pharmacy name entered into [facility]:    [facility] - [place], [place] [facility] [address] [id number] - [place], [place] - [address] [facility]    Clinical concerns:  doctor was notified.      Mariela Lyn, [facility] [address] [facility]              HEMATOLOGY HISTORY: Mrs. Dunaway is a lady with multiple myeloma (IgG kappa). High risk, t(14;16).  1. On 05/01/2017, WBC of 3.4, hemoglobin of 10.2 and platelet of 154.  2. SPEP on 07/07/2017 revealed M-spike of 1.8.  3. Bone marrow biopsy on 07/12/2017 reveals kappa monotypic plasma cell population consistent with multiple myeloma.  Bone marrow is 70% cellular.  Plasma cell is 50-60%.     -There is gain of chromosome 1, 5, 9, 15, and 17.  There is translocation 14;16.   4.  On 07/18/2017:  -M-spike of 1.9.   -Immunofixation reveals monoclonal IgG kappa.    -IgG level of 2970.  IgA of 15 and IgM of 175.    -Kappa free light chain of 67.7.  Lambda free light chain of 1.42.  Ratio of kappa to lambda of 47.71.    -Beta 2 microglobulin of " 3.4.   5. PET scan on 07/24/2017 reveals several focal areas of increased FDG uptake consistent with multiple myeloma.  There is probable epithelial cyst in tail of the pancreas.  No associated abnormal FDG activity.  Left thyroid cysts or nodules without any FDG activity.  There is a 0.3 cm left upper lobe lung nodule.   6.  Velcade, Revlimid and dexamethasone between on 08/14/2017 and 04/30/2018. Velcade stopped.   -Revlimid and dexamethasone continued.  -Revlimid held between 12/27/2021 and 03/08/2022.  7. CT chest, abdomen, and pelvis on 12/30/2021 does not reveal any malignancy.     SUBJECTIVE:  Ms. Dunaway is an 86-year-old female with multiple myeloma on revlimid.  She is tolerating it well.  She is also on Zometa every 3 months.  She is tolerating it well.    Patient has been having toothache.  She saw the dentist.  She is going to see a oral surgeon.  She will require tooth extraction.    No headache.  No dizziness.  No chest pain.  No shortness of breath.  Appetite is fairly good.  No urinary complaint.    No bowel problem.  No fever.  No bleeding.  All other review of system is negative.     PHYSICAL EXAMINATION:   GENERAL:  Alert and oriented x 3.   VITAL SIGNS:  Reviewed.  ECOG PS of 1.     Rest of the system not examined.     LABORATORY: CBC, CMP, TSH, SPEP, IgG level and free light chains reviewed.       ASSESSMENT:    1.  An 86-year-old female with multiple myeloma on Revlimid.  2.  Toothache.  3.  Mild leukopenia from Revlimid  4.  Mild anemia, stable.  5.  Graves' disease.  Improving.     PLAN:    1.  Patient overall is stable from myeloma.  She is on Revlimid.  She takes 2 weeks on and 1 week off.    She will continue on Revlimid.  Beginning of May, see has to go to [place] for her granddaughter's wedding.  I told the patient that after the current cycle, she should hold the Revlimid and resume it when she comes back from [place].  That way she will feel good for the wedding.She is agreeable  for it.    2.  Patient advised to see the oral surgeon and have the tooth extraction as recommended by them.  No contraindication from our side.    3.  She is on Zometa.  Last one was a month ago.  Next one is in June.  I told the patient that Zometa can be given as long as it is 1 month after the dental work.    4.  Labs were reviewed.  Her WBC is mildly low.  Hemoglobin also mildly low. We will continue to monitor it.    5.  She had a few questions which were all answered.  I will see her in 2 months time.  Advised her to call us with any questions or concerns.     TOTAL VISIT TIME: 30 minutes.  Time spent in today's visit, review of chart/investigations today, monitoring for toxicity of high risk medication and documentation today.      Again, thank you for allowing me to participate in the care of your patient.        Sincerely,        Alex Parry MD

## 2022-04-07 NOTE — PATIENT INSTRUCTIONS
1. Continue revlimid and dexamethasone.  2. Zometa in 2 months.  3. Hold aspirin till tooth extraction.  4. See me in 2 months.  5. Labs as per treatment protocol.

## 2022-04-07 NOTE — PROGRESS NOTES
"Oncology Rooming Note    April 7, 2022 1:56 PM   Quiana Dunaway is a 86 year old female who presents for:    Chief Complaint   Patient presents with     Oncology Clinic Visit     Initial Vitals: There were no vitals taken for this visit. Estimated body mass index is 25.79 kg/m  as calculated from the following:    Height as of 4/4/22: 1.575 m (5' 2\").    Weight as of 4/4/22: 64 kg (141 lb). There is no height or weight on file to calculate BSA.  Data Unavailable Comment: Data Unavailable   No LMP recorded. Patient is postmenopausal.  Allergies reviewed: Yes  Medications reviewed: Yes    Medications: Medication refills not needed today.  Pharmacy name entered into [employer].:    [facility] - [place], [place] - [address]  [id number] - [place], [place] - [address] AT [facility]    Clinical concerns:  doctor was notified.      Mariela Lyn, DYANA            "

## 2022-04-07 NOTE — PROGRESS NOTES
HEMATOLOGY HISTORY: Mrs. Dunaway is a lady with multiple myeloma (IgG kappa). High risk, t(14;16).  1. On 05/01/2017, WBC of 3.4, hemoglobin of 10.2 and platelet of 154.  2. SPEP on 07/07/2017 revealed M-spike of 1.8.  3. Bone marrow biopsy on 07/12/2017 reveals kappa monotypic plasma cell population consistent with multiple myeloma.  Bone marrow is 70% cellular.  Plasma cell is 50-60%.     -There is gain of chromosome 1, 5, 9, 15, and 17.  There is translocation 14;16.   4.  On 07/18/2017:  -M-spike of 1.9.   -Immunofixation reveals monoclonal IgG kappa.    -IgG level of 2970.  IgA of 15 and IgM of 175.    -Kappa free light chain of 67.7.  Lambda free light chain of 1.42.  Ratio of kappa to lambda of 47.71.    -Beta 2 microglobulin of 3.4.   5. PET scan on 07/24/2017 reveals several focal areas of increased FDG uptake consistent with multiple myeloma.  There is probable epithelial cyst in tail of the pancreas.  No associated abnormal FDG activity.  Left thyroid cysts or nodules without any FDG activity.  There is a 0.3 cm left upper lobe lung nodule.   6.  Velcade, Revlimid and dexamethasone between on 08/14/2017 and 04/30/2018. Velcade stopped.   -Revlimid and dexamethasone continued.  -Revlimid held between 12/27/2021 and 03/08/2022.  7. CT chest, abdomen, and pelvis on 12/30/2021 does not reveal any malignancy.     SUBJECTIVE:  Ms. Dunaway is an 86-year-old female with multiple myeloma on revlimid.  She is tolerating it well.  She is also on Zometa every 3 months.  She is tolerating it well.    Patient has been having toothache.  She saw the dentist.  She is going to see a oral surgeon.  She will require tooth extraction.    No headache.  No dizziness.  No chest pain.  No shortness of breath.  Appetite is fairly good.  No urinary complaint.    No bowel problem.  No fever.  No bleeding.  All other review of system is negative.     PHYSICAL EXAMINATION:   GENERAL:  Alert and oriented x 3.   VITAL SIGNS:   Reviewed.  ECOG PS of 1.     Rest of the system not examined.     LABORATORY: CBC, CMP, TSH, SPEP, IgG level and free light chains reviewed.       ASSESSMENT:    1.  An 86-year-old female with multiple myeloma on Revlimid.  2.  Toothache.  3.  Mild leukopenia from Revlimid  4.  Mild anemia, stable.  5.  Graves' disease.  Improving.     PLAN:    1.  Patient overall is stable from myeloma.  She is on Revlimid.  She takes 2 weeks on and 1 week off.    She will continue on Revlimid.  Beginning of May, see has to go to [place] for her granddaughter's wedding.  I told the patient that after the current cycle, she should hold the Revlimid and resume it when she comes back from [place].  That way she will feel good for the wedding.She is agreeable for it.    2.  Patient advised to see the oral surgeon and have the tooth extraction as recommended by them.  No contraindication from our side.    3.  She is on Zometa.  Last one was a month ago.  Next one is in June.  I told the patient that Zometa can be given as long as it is 1 month after the dental work.    4.  Labs were reviewed.  Her WBC is mildly low.  Hemoglobin also mildly low. We will continue to monitor it.    5.  She had a few questions which were all answered.  I will see her in 2 months time.  Advised her to call us with any questions or concerns.     TOTAL VISIT TIME: 30 minutes.  Time spent in today's visit, review of chart/investigations today, monitoring for toxicity of high risk medication and documentation today.

## 2022-04-07 NOTE — LETTER
[address]  [address]  [place]  [address]  Tel: [phone]    April 8, 2022    Quiana ANA Emelyn  [address] [facility] [address]        Dear Ms. Dunaway,  The following letter pertains to your most recent diagnostic tests:     Good news! The thyroid test results is perfect.  Keep taking the methimazole as you are.           Sincerely,     Dr. Chung /L         Enclosure: Lab Results  Results for orders placed or performed in visit on 04/07/22   CBC with platelets differential     Status: Abnormal    Narrative    The following orders were created for panel order CBC with platelets differential.  Procedure                               Abnormality         Status                     ---------                               -----------         ------                     CBC with platelets and d...[[id number]]  Abnormal            Final result                 Please view results for these tests on the individual orders.   Comprehensive metabolic panel     Status: Normal   Result Value Ref Range    Sodium 139 133 - 144 mmol/L    Potassium 3.5 3.4 - 5.3 mmol/L    Chloride 105 94 - 109 mmol/L    Carbon Dioxide (CO2) 31 20 - 32 mmol/L    Anion Gap 3 3 - 14 mmol/L    Urea Nitrogen 19 7 - 30 mg/dL    Creatinine 0.85 0.52 - 1.04 mg/dL    Calcium 9.4 8.5 - 10.1 mg/dL    Glucose 87 70 - 99 mg/dL    Alkaline Phosphatase 64 40 - 150 U/L    AST 13 0 - 45 U/L    ALT 21 0 - 50 U/L    Protein Total 7.2 6.8 - 8.8 g/dL    Albumin 3.5 3.4 - 5.0 g/dL    Bilirubin Total 0.5 0.2 - 1.3 mg/dL    GFR Estimate 67 >60 mL/min/1.73m2   CBC with platelets and differential     Status: Abnormal   Result Value Ref Range    WBC Count 3.3 (L) 4.0 - 11.0 10e3/uL    RBC Count 3.31 (L) 3.80 - 5.20 10e6/uL    Hemoglobin 11.0 (L) 11.7 - 15.7 g/dL    Hematocrit 33.4 (L) 35.0 - 47.0 %     (H) 78 - 100 fL    MCH 33.2 (H) 26.5 - 33.0 pg    MCHC 32.9 31.5 - 36.5 g/dL    RDW 13.7 10.0 - 15.0 %     Platelet Count 160 150 - 450 10e3/uL    % Neutrophils 47 %    % Lymphocytes 35 %    % Monocytes 11 %    % Eosinophils 5 %    % Basophils 1 %    % Immature Granulocytes 1 %    NRBCs per 100 WBC 0 <1 /100    Absolute Neutrophils 1.6 1.6 - 8.3 10e3/uL    Absolute Lymphocytes 1.2 0.8 - 5.3 10e3/uL    Absolute Monocytes 0.4 0.0 - 1.3 10e3/uL    Absolute Eosinophils 0.2 0.0 - 0.7 10e3/uL    Absolute Basophils 0.0 0.0 - 0.2 10e3/uL    Absolute Immature Granulocytes 0.0 <=0.4 10e3/uL    Absolute NRBCs 0.0 10e3/uL   Total Protein, Serum for ELP     Status: Normal   Result Value Ref Range    Total Protein Serum for ELP 6.9 6.8 - 8.8 g/dL   TSH with free T4 reflex     Status: Normal   Result Value Ref Range    TSH 2.76 0.40 - 4.00 mU/L   Protein electrophoresis     Status: None (In process)    Narrative    The following orders were created for panel order Protein electrophoresis.  Procedure                               Abnormality         Status                     ---------                               -----------         ------                     Total Protein, Serum for...[[id number]]  Normal              Final result               Protein Electrophoresis,...[[id number]]                      In process                   Please view results for these tests on the individual orders.

## 2022-04-07 NOTE — PROGRESS NOTES
Medical Assistant Note:  Quiana Dunaway presents today for lab draw.    Patient seen by provider today: Yes: Dr Rodriguez.   present during visit today: Not Applicable.    Concerns: No Concerns.    Procedure:  Lab draw site: LAC, Needle type: BF, Gauge: 21. Gauze and coban applied    Post Assessment:  Labs drawn without difficulty: Yes.    Discharge Plan:  Departure Mode: Ambulatory.    Face to Face Time: 5.    Sultana Jones CMA

## 2022-04-08 LAB
ALBUMIN SERPL ELPH-MCNC: 4.1 G/DL (ref 3.7–5.1)
ALPHA1 GLOB SERPL ELPH-MCNC: 0.4 G/DL (ref 0.2–0.4)
ALPHA2 GLOB SERPL ELPH-MCNC: 0.7 G/DL (ref 0.5–0.9)
B-GLOBULIN SERPL ELPH-MCNC: 0.7 G/DL (ref 0.6–1)
GAMMA GLOB SERPL ELPH-MCNC: 1 G/DL (ref 0.7–1.6)
IGG SERPL-MCNC: 1102 MG/DL (ref 610–1616)
KAPPA LC FREE SER-MCNC: 52.88 MG/DL (ref 0.33–1.94)
KAPPA LC FREE/LAMBDA FREE SER NEPH: 67.79 {RATIO} (ref 0.26–1.65)
LAMBDA LC FREE SERPL-MCNC: 0.78 MG/DL (ref 0.57–2.63)
M PROTEIN SERPL ELPH-MCNC: 0.8 G/DL
PROT PATTERN SERPL ELPH-IMP: ABNORMAL

## 2022-04-08 PROCEDURE — 84165 PROTEIN E-PHORESIS SERUM: CPT | Mod: 26 | Performed by: STUDENT IN AN ORGANIZED HEALTH CARE EDUCATION/TRAINING PROGRAM

## 2022-04-08 NOTE — RESULT ENCOUNTER NOTE
The following letter pertains to your most recent diagnostic tests:    Good news! The thyroid test results is perfect.  Keep taking the methimazole as you are.          Sincerely,    Dr. Chung

## 2022-04-11 DIAGNOSIS — R35.0 URINARY FREQUENCY: ICD-10-CM

## 2022-04-11 RX ORDER — MIRABEGRON 50 MG/1
50 TABLET, EXTENDED RELEASE ORAL DAILY
Qty: 90 TABLET | Refills: 0 | Status: SHIPPED | OUTPATIENT
Start: 2022-04-11 | End: 2022-07-19

## 2022-04-14 RX ORDER — DEXAMETHASONE 4 MG/1
4 TABLET ORAL WEEKLY
Qty: 3 TABLET | Refills: 0 | Status: SHIPPED | OUTPATIENT
Start: 2022-04-14 | End: 2022-06-15

## 2022-04-14 RX ORDER — LENALIDOMIDE 10 MG/1
10 CAPSULE ORAL DAILY
Qty: 14 CAPSULE | Refills: 0 | Status: SHIPPED | OUTPATIENT
Start: 2022-04-14 | End: 2022-06-15

## 2022-04-18 ENCOUNTER — TELEPHONE (OUTPATIENT)
Dept: PHARMACY | Facility: CLINIC | Age: 87
End: 2022-04-18
Payer: MEDICARE

## 2022-04-19 ENCOUNTER — MEDICAL CORRESPONDENCE (OUTPATIENT)
Dept: HEALTH INFORMATION MANAGEMENT | Facility: CLINIC | Age: 87
End: 2022-04-19
Payer: MEDICARE

## 2022-04-21 ENCOUNTER — TELEPHONE (OUTPATIENT)
Dept: ONCOLOGY | Facility: CLINIC | Age: 87
End: 2022-04-21
Payer: MEDICARE

## 2022-04-21 NOTE — TELEPHONE ENCOUNTER
Writer spoke with pharmacist Leonard regarding Juaquin Hammond ROSA MARIA recommendations.    Juaquin Hammond APRN CNP  You; Alecia Briggs, RN 7 minutes ago (2:57 PM)     GK    Please review with pharmacy but I will hold it for a week    Message text       Per Leonard pt is due to start 14 day cycle of Revlimid which will end May 9th the day before the teeth extraction on May 10th. Pt okay to restart Revlimid, and then will be on her week off starting May 10th when teeth are extracted with no chemo during that week.    Writer called Regi to inform her of these recommendations. Regi voiced understanding and will follow recommendations.

## 2022-04-21 NOTE — TELEPHONE ENCOUNTER
Regi calling in on behalf of the pt. Pt is scheduled for two teeth to be extracted on May 10th. Pt is scheduled to restart Revlimid this coming Monday 4/25 per Regi.    Regi is inquiring if Revlimid should wait to be restarted until after teeth have been extracted.    Will route to provider for recommendations.

## 2022-04-25 ENCOUNTER — TELEPHONE (OUTPATIENT)
Dept: FAMILY MEDICINE | Facility: CLINIC | Age: 87
End: 2022-04-25
Payer: MEDICARE

## 2022-04-25 DIAGNOSIS — C90.00 MULTIPLE MYELOMA NOT HAVING ACHIEVED REMISSION (H): ICD-10-CM

## 2022-04-25 NOTE — TELEPHONE ENCOUNTER
Patient is requesting a new rx for:    Prochlorperazine 10mg  1Q6H PRN for Nausea/vomitting    Did not see medication on active med list. Please verify and if ok'd send new rx.     [facility]  [phone]

## 2022-04-26 RX ORDER — LORAZEPAM 0.5 MG/1
0.5 TABLET ORAL EVERY 6 HOURS PRN
Qty: 30 TABLET | Refills: 3 | Status: SHIPPED | OUTPATIENT
Start: 2022-04-26 | End: 2022-08-12

## 2022-04-26 RX ORDER — PROCHLORPERAZINE MALEATE 10 MG
10 TABLET ORAL EVERY 6 HOURS PRN
Qty: 30 TABLET | Refills: 6 | Status: ON HOLD | OUTPATIENT
Start: 2022-04-26 | End: 2022-12-13

## 2022-05-09 ENCOUNTER — TELEPHONE (OUTPATIENT)
Dept: ONCOLOGY | Facility: CLINIC | Age: 87
End: 2022-05-09
Payer: MEDICARE

## 2022-05-09 NOTE — TELEPHONE ENCOUNTER
"----- Message from Alex Parry MD sent at 5/6/2022  3:29 PM CDT -----  Regarding: RE: needs labs non urgent  That is fine. Labs in June.    bk  ----- Message -----  From: Alecia Briggs RN  Sent: 5/6/2022  11:51 AM CDT  To: Alex Parry MD  Subject: FW: needs labs non urgent                        Hi Dr. Parry,     Patient's daughter,Regi, states hat patient does not needs labs in May, and scheduled for June.     Are you \"ok\" with labs waiting till June?    Please advise.     Thank you,  Petra  ----- Message -----  From: Montse Gibbons  Sent: 5/2/2022   8:45 AM CDT  To: Alecia Briggs, FAUSTINO, Debra Ortega [facility]  Subject: RE: needs labs                                   Good Morning,    I scheduled the patient for June. Her daughter said she doesn't need one for the month of May.    Please AdviseMontse  ----- Message -----  From: Debra Ortega [facility]  Sent: 5/2/2022   8:17 AM CDT  To: [facility]   Subject: needs labs                                       Good morning!    Quiana is due for labs for her revlimid (monthly per Dr. Parry). Can you please call her to schedule these sometime in the next week?    Thank you!  Debra"

## 2022-05-10 DIAGNOSIS — C90.00 MULTIPLE MYELOMA NOT HAVING ACHIEVED REMISSION (H): Primary | ICD-10-CM

## 2022-05-10 RX ORDER — DEXAMETHASONE 4 MG/1
4 TABLET ORAL WEEKLY
Qty: 3 TABLET | Refills: 0 | Status: SHIPPED | OUTPATIENT
Start: 2022-05-10 | End: 2022-06-15

## 2022-05-10 RX ORDER — LENALIDOMIDE 10 MG/1
10 CAPSULE ORAL DAILY
Qty: 14 CAPSULE | Refills: 0 | Status: SHIPPED | OUTPATIENT
Start: 2022-05-10 | End: 2022-06-15

## 2022-05-11 ENCOUNTER — TELEPHONE (OUTPATIENT)
Dept: ONCOLOGY | Facility: CLINIC | Age: 87
End: 2022-05-11
Payer: MEDICARE

## 2022-05-17 ENCOUNTER — OFFICE VISIT (OUTPATIENT)
Dept: FAMILY MEDICINE | Facility: CLINIC | Age: 87
End: 2022-05-17
Payer: MEDICARE

## 2022-05-17 VITALS
WEIGHT: 139 LBS | DIASTOLIC BLOOD PRESSURE: 76 MMHG | TEMPERATURE: 97.5 F | OXYGEN SATURATION: 93 % | HEIGHT: 62 IN | BODY MASS INDEX: 25.58 KG/M2 | SYSTOLIC BLOOD PRESSURE: 149 MMHG | HEART RATE: 80 BPM

## 2022-05-17 DIAGNOSIS — E05.20 TOXIC MULTINODUL GOITER: ICD-10-CM

## 2022-05-17 DIAGNOSIS — C90.00 MULTIPLE MYELOMA NOT HAVING ACHIEVED REMISSION (H): ICD-10-CM

## 2022-05-17 DIAGNOSIS — I10 BENIGN ESSENTIAL HYPERTENSION: ICD-10-CM

## 2022-05-17 DIAGNOSIS — R11.0 NAUSEA: Primary | ICD-10-CM

## 2022-05-17 PROCEDURE — 99214 OFFICE O/P EST MOD 30 MIN: CPT | Performed by: INTERNAL MEDICINE

## 2022-05-17 RX ORDER — ONDANSETRON 8 MG/1
8 TABLET, FILM COATED ORAL EVERY 8 HOURS PRN
Qty: 90 TABLET | Refills: 11 | Status: SHIPPED | OUTPATIENT
Start: 2022-05-17 | End: 2023-06-01

## 2022-05-17 ASSESSMENT — PAIN SCALES - GENERAL: PAINLEVEL: NO PAIN (0)

## 2022-05-17 NOTE — NURSING NOTE
"Quiana Dunaway is a 86 year old patient who comes in today for a clinic check.  Initial BP:  BP (!) 149/76 (BP Location: Left arm, Cuff Size: Adult Regular)   Pulse 80   Temp 97.5  F (36.4  C) (Temporal)   Ht 1.575 m (5' 2\")   Wt 63 kg (139 lb)   SpO2 93%   BMI 25.42 kg/m         Disposition: provider notified elevated BP while patient in the clinic    Haydee Galarza MA        "

## 2022-05-17 NOTE — PROGRESS NOTES
"  Assessment & Plan     Nausea  Try scheduled 8 mg zofran before meals   Side effects and risks discussed   - ondansetron (ZOFRAN) 8 MG tablet; Take 1 tablet (8 mg) by mouth every 8 hours as needed for nausea    Toxic multinodul goiter  Check TSH with mid June labs at oncology clinic     Benign essential hypertension  OK control given her age an comorbid conditions     Multiple myeloma not having achieved remission (H)  Continue follow up as directed with Dr. Parry's team         16 minutes spent on the date of the encounter doing chart review, history and exam, documentation and further activities per the note         Return in about 2 months (around 7/17/2022) for recheck.  Patient instructed to return to clinic or contact us sooner if symptoms worsen or new symptoms develop.     César Chung MD  [facility] MAGALY Araya is a 86 year old who presents for the following health issues     HPI       Follow up MM, hypertension, anxiety, hyperthyroidism from toxic multinodular goiter  Restarted chemotherapy for MM  Feels nausea without vomiting has symptoms most days weight stable  No improvement with phenergan or famotidine  zofran works best without fatigue or constipation side effects   Otherwise feels well, but needed multiple tooth extractions     Review of Systems         Objective    BP (!) 143/76 (BP Location: Right arm, Patient Position: Sitting, Cuff Size: Adult Regular)   Pulse 73   Temp 97.5  F (36.4  C) (Temporal)   Ht 1.575 m (5' 2\")   Wt 63 kg (139 lb)   SpO2 93%   BMI 25.42 kg/m    Body mass index is 25.42 kg/m .  Physical Exam   GENERAL: healthy, alert and no distress  RESP: lungs clear to auscultation - no rales, rhonchi or wheezes  CV: Heart with regular rate and rhythm.   ABDOMEN: soft, nontender, no hepatosplenomegaly, no masses and bowel sounds normal  MS: no gross musculoskeletal defects noted, no edema  NEURO: Normal strength and tone, mentation intact and " speech normal  PSYCH: mentation appears normal, affect normal/bright

## 2022-05-18 ENCOUNTER — ANCILLARY PROCEDURE (OUTPATIENT)
Dept: CARDIOLOGY | Facility: CLINIC | Age: 87
End: 2022-05-18
Attending: INTERNAL MEDICINE
Payer: MEDICARE

## 2022-05-18 DIAGNOSIS — Z95.0 CARDIAC PACEMAKER IN SITU: ICD-10-CM

## 2022-05-18 DIAGNOSIS — I49.5 SICK SINUS SYNDROME (H): ICD-10-CM

## 2022-05-18 LAB
MDC_IDC_EPISODE_DTM: NORMAL
MDC_IDC_EPISODE_DURATION: 121 S
MDC_IDC_EPISODE_DURATION: 179 S
MDC_IDC_EPISODE_DURATION: 884 S
MDC_IDC_EPISODE_ID: 37
MDC_IDC_EPISODE_ID: 38
MDC_IDC_EPISODE_ID: 39
MDC_IDC_EPISODE_TYPE: NORMAL
MDC_IDC_LEAD_IMPLANT_DT: NORMAL
MDC_IDC_LEAD_IMPLANT_DT: NORMAL
MDC_IDC_LEAD_LOCATION: NORMAL
MDC_IDC_LEAD_LOCATION: NORMAL
MDC_IDC_LEAD_LOCATION_DETAIL_1: NORMAL
MDC_IDC_LEAD_LOCATION_DETAIL_1: NORMAL
MDC_IDC_LEAD_MFG: NORMAL
MDC_IDC_LEAD_MFG: NORMAL
MDC_IDC_LEAD_MODEL: NORMAL
MDC_IDC_LEAD_MODEL: NORMAL
MDC_IDC_LEAD_POLARITY_TYPE: NORMAL
MDC_IDC_LEAD_POLARITY_TYPE: NORMAL
MDC_IDC_LEAD_SERIAL: NORMAL
MDC_IDC_LEAD_SERIAL: NORMAL
MDC_IDC_MSMT_BATTERY_DTM: NORMAL
MDC_IDC_MSMT_BATTERY_REMAINING_LONGEVITY: 27 MO
MDC_IDC_MSMT_BATTERY_RRT_TRIGGER: 2.83
MDC_IDC_MSMT_BATTERY_STATUS: NORMAL
MDC_IDC_MSMT_BATTERY_VOLTAGE: 2.97 V
MDC_IDC_MSMT_LEADCHNL_RA_IMPEDANCE_VALUE: 323 OHM
MDC_IDC_MSMT_LEADCHNL_RA_IMPEDANCE_VALUE: 342 OHM
MDC_IDC_MSMT_LEADCHNL_RA_PACING_THRESHOLD_AMPLITUDE: 0.38 V
MDC_IDC_MSMT_LEADCHNL_RA_PACING_THRESHOLD_PULSEWIDTH: 0.4 MS
MDC_IDC_MSMT_LEADCHNL_RA_SENSING_INTR_AMPL: 2.25 MV
MDC_IDC_MSMT_LEADCHNL_RA_SENSING_INTR_AMPL: 2.25 MV
MDC_IDC_MSMT_LEADCHNL_RV_IMPEDANCE_VALUE: 361 OHM
MDC_IDC_MSMT_LEADCHNL_RV_IMPEDANCE_VALUE: 475 OHM
MDC_IDC_MSMT_LEADCHNL_RV_PACING_THRESHOLD_AMPLITUDE: 2.25 V
MDC_IDC_MSMT_LEADCHNL_RV_PACING_THRESHOLD_PULSEWIDTH: 0.4 MS
MDC_IDC_MSMT_LEADCHNL_RV_SENSING_INTR_AMPL: 6.25 MV
MDC_IDC_MSMT_LEADCHNL_RV_SENSING_INTR_AMPL: 6.25 MV
MDC_IDC_PG_IMPLANT_DTM: NORMAL
MDC_IDC_PG_MFG: NORMAL
MDC_IDC_PG_MODEL: NORMAL
MDC_IDC_PG_SERIAL: NORMAL
MDC_IDC_PG_TYPE: NORMAL
MDC_IDC_SESS_CLINIC_NAME: NORMAL
MDC_IDC_SESS_DTM: NORMAL
MDC_IDC_SESS_TYPE: NORMAL
MDC_IDC_SET_BRADY_AT_MODE_SWITCH_RATE: 171 {BEATS}/MIN
MDC_IDC_SET_BRADY_HYSTRATE: NORMAL
MDC_IDC_SET_BRADY_LOWRATE: 60 {BEATS}/MIN
MDC_IDC_SET_BRADY_MAX_SENSOR_RATE: 120 {BEATS}/MIN
MDC_IDC_SET_BRADY_MAX_TRACKING_RATE: 120 {BEATS}/MIN
MDC_IDC_SET_BRADY_MODE: NORMAL
MDC_IDC_SET_BRADY_PAV_DELAY_LOW: 300 MS
MDC_IDC_SET_BRADY_SAV_DELAY_LOW: 300 MS
MDC_IDC_SET_LEADCHNL_RA_PACING_AMPLITUDE: 1.5 V
MDC_IDC_SET_LEADCHNL_RA_PACING_ANODE_ELECTRODE_1: NORMAL
MDC_IDC_SET_LEADCHNL_RA_PACING_ANODE_LOCATION_1: NORMAL
MDC_IDC_SET_LEADCHNL_RA_PACING_CAPTURE_MODE: NORMAL
MDC_IDC_SET_LEADCHNL_RA_PACING_CATHODE_ELECTRODE_1: NORMAL
MDC_IDC_SET_LEADCHNL_RA_PACING_CATHODE_LOCATION_1: NORMAL
MDC_IDC_SET_LEADCHNL_RA_PACING_POLARITY: NORMAL
MDC_IDC_SET_LEADCHNL_RA_PACING_PULSEWIDTH: 0.4 MS
MDC_IDC_SET_LEADCHNL_RA_SENSING_ANODE_ELECTRODE_1: NORMAL
MDC_IDC_SET_LEADCHNL_RA_SENSING_ANODE_LOCATION_1: NORMAL
MDC_IDC_SET_LEADCHNL_RA_SENSING_CATHODE_ELECTRODE_1: NORMAL
MDC_IDC_SET_LEADCHNL_RA_SENSING_CATHODE_LOCATION_1: NORMAL
MDC_IDC_SET_LEADCHNL_RA_SENSING_POLARITY: NORMAL
MDC_IDC_SET_LEADCHNL_RA_SENSING_SENSITIVITY: 0.3 MV
MDC_IDC_SET_LEADCHNL_RV_PACING_AMPLITUDE: 4.5 V
MDC_IDC_SET_LEADCHNL_RV_PACING_ANODE_ELECTRODE_1: NORMAL
MDC_IDC_SET_LEADCHNL_RV_PACING_ANODE_LOCATION_1: NORMAL
MDC_IDC_SET_LEADCHNL_RV_PACING_CAPTURE_MODE: NORMAL
MDC_IDC_SET_LEADCHNL_RV_PACING_CATHODE_ELECTRODE_1: NORMAL
MDC_IDC_SET_LEADCHNL_RV_PACING_CATHODE_LOCATION_1: NORMAL
MDC_IDC_SET_LEADCHNL_RV_PACING_POLARITY: NORMAL
MDC_IDC_SET_LEADCHNL_RV_PACING_PULSEWIDTH: 0.4 MS
MDC_IDC_SET_LEADCHNL_RV_SENSING_ANODE_ELECTRODE_1: NORMAL
MDC_IDC_SET_LEADCHNL_RV_SENSING_ANODE_LOCATION_1: NORMAL
MDC_IDC_SET_LEADCHNL_RV_SENSING_CATHODE_ELECTRODE_1: NORMAL
MDC_IDC_SET_LEADCHNL_RV_SENSING_CATHODE_LOCATION_1: NORMAL
MDC_IDC_SET_LEADCHNL_RV_SENSING_POLARITY: NORMAL
MDC_IDC_SET_LEADCHNL_RV_SENSING_SENSITIVITY: 0.9 MV
MDC_IDC_SET_ZONE_DETECTION_INTERVAL: 350 MS
MDC_IDC_SET_ZONE_DETECTION_INTERVAL: 400 MS
MDC_IDC_SET_ZONE_TYPE: NORMAL
MDC_IDC_STAT_AT_BURDEN_PERCENT: 0 %
MDC_IDC_STAT_AT_DTM_END: NORMAL
MDC_IDC_STAT_AT_DTM_START: NORMAL
MDC_IDC_STAT_BRADY_AP_VP_PERCENT: 86.32 %
MDC_IDC_STAT_BRADY_AP_VS_PERCENT: 0.03 %
MDC_IDC_STAT_BRADY_AS_VP_PERCENT: 13.65 %
MDC_IDC_STAT_BRADY_AS_VS_PERCENT: 0.01 %
MDC_IDC_STAT_BRADY_DTM_END: NORMAL
MDC_IDC_STAT_BRADY_DTM_START: NORMAL
MDC_IDC_STAT_BRADY_RA_PERCENT_PACED: 86.3 %
MDC_IDC_STAT_BRADY_RV_PERCENT_PACED: 99.97 %
MDC_IDC_STAT_EPISODE_RECENT_COUNT: 0
MDC_IDC_STAT_EPISODE_RECENT_COUNT: 3
MDC_IDC_STAT_EPISODE_RECENT_COUNT_DTM_END: NORMAL
MDC_IDC_STAT_EPISODE_RECENT_COUNT_DTM_START: NORMAL
MDC_IDC_STAT_EPISODE_TOTAL_COUNT: 0
MDC_IDC_STAT_EPISODE_TOTAL_COUNT: 0
MDC_IDC_STAT_EPISODE_TOTAL_COUNT: 11
MDC_IDC_STAT_EPISODE_TOTAL_COUNT: 25
MDC_IDC_STAT_EPISODE_TOTAL_COUNT_DTM_END: NORMAL
MDC_IDC_STAT_EPISODE_TOTAL_COUNT_DTM_START: NORMAL
MDC_IDC_STAT_EPISODE_TYPE: NORMAL

## 2022-05-18 PROCEDURE — 93296 REM INTERROG EVL PM/IDS: CPT | Performed by: INTERNAL MEDICINE

## 2022-05-18 PROCEDURE — 93294 REM INTERROG EVL PM/LDLS PM: CPT | Performed by: INTERNAL MEDICINE

## 2022-05-25 RX ORDER — DEXAMETHASONE 4 MG/1
4 TABLET ORAL WEEKLY
Qty: 3 TABLET | Refills: 0 | Status: SHIPPED | OUTPATIENT
Start: 2022-05-25 | End: 2022-06-15

## 2022-05-25 RX ORDER — LENALIDOMIDE 10 MG/1
10 CAPSULE ORAL DAILY
Qty: 14 CAPSULE | Refills: 0 | Status: SHIPPED | OUTPATIENT
Start: 2022-05-25 | End: 2022-06-15

## 2022-05-27 ENCOUNTER — TELEPHONE (OUTPATIENT)
Dept: PHARMACY | Facility: CLINIC | Age: 87
End: 2022-05-27
Payer: MEDICARE

## 2022-05-27 NOTE — TELEPHONE ENCOUNTER
Oral Chemotherapy Monitoring Program   Medication: Revlimid  Rx: 10mg PO daily on days 1 through 14 of 21 day cycle   Auth #:[id number] Obtained on: 5/27/2022  Risk Category: adult female of non child bearing potential  Routine survey questions reviewed.   Rx to be Escribed to [facility]     Terrie Bowen [facility] Pharm[facility] Liaison  [phone]

## 2022-06-14 DIAGNOSIS — C90.00 MULTIPLE MYELOMA NOT HAVING ACHIEVED REMISSION (H): Primary | ICD-10-CM

## 2022-06-14 RX ORDER — LENALIDOMIDE 10 MG/1
10 CAPSULE ORAL DAILY
Qty: 14 CAPSULE | Refills: 0 | Status: SHIPPED | OUTPATIENT
Start: 2022-06-14 | End: 2022-08-17

## 2022-06-14 RX ORDER — DEXAMETHASONE 4 MG/1
4 TABLET ORAL WEEKLY
Qty: 3 TABLET | Refills: 0 | Status: CANCELLED | OUTPATIENT
Start: 2022-07-03

## 2022-06-14 RX ORDER — LENALIDOMIDE 10 MG/1
10 CAPSULE ORAL DAILY
Qty: 14 CAPSULE | Refills: 0 | Status: CANCELLED | OUTPATIENT
Start: 2022-07-03 | End: 2022-07-17

## 2022-06-14 RX ORDER — DEXAMETHASONE 4 MG/1
4 TABLET ORAL WEEKLY
Qty: 3 TABLET | Refills: 0 | Status: SHIPPED | OUTPATIENT
Start: 2022-06-14 | End: 2022-06-15

## 2022-06-15 ENCOUNTER — ONCOLOGY VISIT (OUTPATIENT)
Dept: ONCOLOGY | Facility: CLINIC | Age: 87
End: 2022-06-15
Attending: INTERNAL MEDICINE
Payer: MEDICARE

## 2022-06-15 ENCOUNTER — LAB (OUTPATIENT)
Dept: INFUSION THERAPY | Facility: CLINIC | Age: 87
End: 2022-06-15
Attending: INTERNAL MEDICINE
Payer: MEDICARE

## 2022-06-15 VITALS
RESPIRATION RATE: 16 BRPM | BODY MASS INDEX: 25.06 KG/M2 | SYSTOLIC BLOOD PRESSURE: 126 MMHG | HEART RATE: 73 BPM | WEIGHT: 137 LBS | DIASTOLIC BLOOD PRESSURE: 70 MMHG | OXYGEN SATURATION: 96 %

## 2022-06-15 DIAGNOSIS — C90.00 MULTIPLE MYELOMA NOT HAVING ACHIEVED REMISSION (H): Primary | ICD-10-CM

## 2022-06-15 DIAGNOSIS — I50.32 CHRONIC DIASTOLIC CONGESTIVE HEART FAILURE (H): ICD-10-CM

## 2022-06-15 DIAGNOSIS — E05.20 TOXIC MULTINODUL GOITER: ICD-10-CM

## 2022-06-15 LAB
ALBUMIN SERPL-MCNC: 3.4 G/DL (ref 3.4–5)
ALP SERPL-CCNC: 55 U/L (ref 40–150)
ALT SERPL W P-5'-P-CCNC: 14 U/L (ref 0–50)
ANION GAP SERPL CALCULATED.3IONS-SCNC: 7 MMOL/L (ref 3–14)
AST SERPL W P-5'-P-CCNC: 8 U/L (ref 0–45)
BASOPHILS # BLD AUTO: 0 10E3/UL (ref 0–0.2)
BASOPHILS NFR BLD AUTO: 1 %
BILIRUB SERPL-MCNC: 0.7 MG/DL (ref 0.2–1.3)
BUN SERPL-MCNC: 19 MG/DL (ref 7–30)
CALCIUM SERPL-MCNC: 8.3 MG/DL (ref 8.5–10.1)
CHLORIDE BLD-SCNC: 107 MMOL/L (ref 94–109)
CO2 SERPL-SCNC: 30 MMOL/L (ref 20–32)
CREAT SERPL-MCNC: 0.75 MG/DL (ref 0.52–1.04)
EOSINOPHIL # BLD AUTO: 0.1 10E3/UL (ref 0–0.7)
EOSINOPHIL NFR BLD AUTO: 4 %
ERYTHROCYTE [DISTWIDTH] IN BLOOD BY AUTOMATED COUNT: 13.6 % (ref 10–15)
GFR SERPL CREATININE-BSD FRML MDRD: 78 ML/MIN/1.73M2
GLUCOSE BLD-MCNC: 117 MG/DL (ref 70–99)
HCT VFR BLD AUTO: 33.1 % (ref 35–47)
HGB BLD-MCNC: 10.7 G/DL (ref 11.7–15.7)
IMM GRANULOCYTES # BLD: 0 10E3/UL
IMM GRANULOCYTES NFR BLD: 1 %
LYMPHOCYTES # BLD AUTO: 1.3 10E3/UL (ref 0.8–5.3)
LYMPHOCYTES NFR BLD AUTO: 32 %
MCH RBC QN AUTO: 31.8 PG (ref 26.5–33)
MCHC RBC AUTO-ENTMCNC: 32.3 G/DL (ref 31.5–36.5)
MCV RBC AUTO: 98 FL (ref 78–100)
MONOCYTES # BLD AUTO: 0.4 10E3/UL (ref 0–1.3)
MONOCYTES NFR BLD AUTO: 10 %
NEUTROPHILS # BLD AUTO: 2.1 10E3/UL (ref 1.6–8.3)
NEUTROPHILS NFR BLD AUTO: 52 %
NRBC # BLD AUTO: 0 10E3/UL
NRBC BLD AUTO-RTO: 0 /100
PLATELET # BLD AUTO: 187 10E3/UL (ref 150–450)
POTASSIUM BLD-SCNC: 3.1 MMOL/L (ref 3.4–5.3)
PROT SERPL-MCNC: 6.7 G/DL (ref 6.8–8.8)
RBC # BLD AUTO: 3.37 10E6/UL (ref 3.8–5.2)
SODIUM SERPL-SCNC: 144 MMOL/L (ref 133–144)
TOTAL PROTEIN SERUM FOR ELP: 6.2 G/DL (ref 6.8–8.8)
TSH SERPL DL<=0.005 MIU/L-ACNC: 0.5 MU/L (ref 0.4–4)
WBC # BLD AUTO: 4 10E3/UL (ref 4–11)

## 2022-06-15 PROCEDURE — 82784 ASSAY IGA/IGD/IGG/IGM EACH: CPT | Performed by: INTERNAL MEDICINE

## 2022-06-15 PROCEDURE — 36415 COLL VENOUS BLD VENIPUNCTURE: CPT | Performed by: INTERNAL MEDICINE

## 2022-06-15 PROCEDURE — 99215 OFFICE O/P EST HI 40 MIN: CPT | Performed by: INTERNAL MEDICINE

## 2022-06-15 PROCEDURE — 83521 IG LIGHT CHAINS FREE EACH: CPT | Performed by: INTERNAL MEDICINE

## 2022-06-15 PROCEDURE — 84443 ASSAY THYROID STIM HORMONE: CPT | Performed by: INTERNAL MEDICINE

## 2022-06-15 PROCEDURE — 84155 ASSAY OF PROTEIN SERUM: CPT | Mod: 91 | Performed by: INTERNAL MEDICINE

## 2022-06-15 PROCEDURE — 84165 PROTEIN E-PHORESIS SERUM: CPT | Mod: TC | Performed by: PATHOLOGY

## 2022-06-15 PROCEDURE — G0463 HOSPITAL OUTPT CLINIC VISIT: HCPCS

## 2022-06-15 PROCEDURE — 80053 COMPREHEN METABOLIC PANEL: CPT | Performed by: INTERNAL MEDICINE

## 2022-06-15 PROCEDURE — 85025 COMPLETE CBC W/AUTO DIFF WBC: CPT | Performed by: INTERNAL MEDICINE

## 2022-06-15 RX ORDER — POTASSIUM CHLORIDE 750 MG/1
10 TABLET, EXTENDED RELEASE ORAL DAILY
Qty: 90 TABLET | Refills: 3 | Status: SHIPPED | OUTPATIENT
Start: 2022-06-15 | End: 2023-06-07

## 2022-06-15 RX ORDER — DEXAMETHASONE 4 MG/1
4 TABLET ORAL
Qty: 12 TABLET | Refills: 3 | Status: SHIPPED | OUTPATIENT
Start: 2022-06-15 | End: 2022-08-17

## 2022-06-15 ASSESSMENT — PAIN SCALES - GENERAL: PAINLEVEL: NO PAIN (0)

## 2022-06-15 NOTE — PROGRESS NOTES
HEMATOLOGY HISTORY: Mrs. Dunaway is a lady with multiple myeloma (IgG kappa). High risk, t(14;16).  1. On 05/01/2017, WBC of 3.4, hemoglobin of 10.2 and platelet of 154.  2. SPEP on 07/07/2017 revealed M-spike of 1.8.  3. Bone marrow biopsy on 07/12/2017 reveals kappa monotypic plasma cell population consistent with multiple myeloma.  Bone marrow is 70% cellular.  Plasma cell is 50-60%.     -There is gain of chromosome 1, 5, 9, 15, and 17.  There is translocation 14;16.   4.  On 07/18/2017:  -M-spike of 1.9.   -Immunofixation reveals monoclonal IgG kappa.    -IgG level of 2970.  IgA of 15 and IgM of 175.    -Kappa free light chain of 67.7.  Lambda free light chain of 1.42.  Ratio of kappa to lambda of 47.71.    -Beta 2 microglobulin of 3.4.   5. PET scan on 07/24/2017 reveals several focal areas of increased FDG uptake consistent with multiple myeloma.  There is probable epithelial cyst in tail of the pancreas.  No associated abnormal FDG activity.  Left thyroid cysts or nodules without any FDG activity.  There is a 0.3 cm left upper lobe lung nodule.   6.  Velcade, Revlimid and dexamethasone between on 08/14/2017 and 04/30/2018. Velcade stopped.   -Revlimid and dexamethasone continued.  -Revlimid held between 12/27/2021 and 03/08/2022.  7. CT chest, abdomen, and pelvis on 12/30/2021 does not reveal any malignancy.     SUBJECTIVE:  Ms. Dunaway is an 86-year-old female with multiple myeloma on revlimid and weekly dexamethasone.   Zometa is currently on hold as she is getting dental work.     Patient does not feel well when she is on Revlimid.  She gets more fatigued.  Appetite also decreases.  When she is off her Revlimid, she feels better.    She feels weak.  No headache.  Some lightheadedness.  No chest pain.  No shortness of breath.  No abdominal pain.  No nausea or vomiting.  Appetite has been decreased.  No urinary complaint.  No bowel problem.  No bleeding. All other review of system is negative.     PHYSICAL  EXAMINATION:   GENERAL:  Alert and oriented x 3.   VITAL SIGNS:  Reviewed.  ECOG PS of 1.     EYES:  No icterus.   NECK:  Supple. No lymphadenopathy. No thyromegaly.   AXILLAE:  No lymphadenopathy.   LUNGS:  Good air entry bilaterally.  No crackles or wheezing.   HEART:  Regular.  No murmur.   GI: Abdomen is soft.  Nontender. No mass. Difficult palpation because of her weight.  EXTREMITIES:  No pedal edema.  No calf swelling or tenderness.   SKIN:  No rash.     LABORATORY: CBC, CMP and TSH reviewed. SPEP, IgG level and free light chains are pending.       ASSESSMENT:    1.  An 86-year-old female with multiple myeloma on Revlimid and weekly dexamethasone.  2.  Anemia, stable.  3.  Fatigue.  4.  Decreased appetite.  5.  Graves' disease.  Improving.     PLAN:    1.  Discussed with her regarding myeloma.  Myeloma labs from today are pending.  In between her kappa free light chain had increased significantly as she was off Revlimid.  Now she is taking Revlimid regularly.  She takes it for 2 weeks on and 1 week off.    If labs from today reveal improvement in myeloma, she will continue on current treatment of Revlimid and weekly dexamethasone.  If labs reveal progression of myeloma, then I will add daratumumab to Revlimid and dexamethasone.  She is agreeable for it.  Patient wants to avoid adding other treatment for myeloma as she is worried about new/different side effects from those medications.    Side effects of Revlimid reviewed.  She does get some side effect including increased fatigue and decreased appetite.  Patient said that this side effect is tolerable.    2.  She is completing dental work-up.  Once completed, we will resume zometa.    3.  She will continue on aspirin and acyclovir.    4.  Her potassium is low.  She is supposed to be on oral potassium.  She has not been taking it regularly.  She will take 20 mEq a day for next 3 days.  After that 10 mEq a day.    5.  Patient has anemia which is overall stable.   We will continue to monitor it. It is mainly from Revlimid.    6.  TSH from today is normal.  She is on methimazole for hyperthyroidism.    7.  Patient had few questions which were all answered.  I will see her in 2 months time.  Advised her to call us with any questions or concerns.     TOTAL VISIT TIME: 40 minutes.  Time spent in today's visit, review of chart/investigations today, monitoring for toxicity of high risk medication and documentation today.

## 2022-06-15 NOTE — PROGRESS NOTES
Medical Assistant Note:  Quiana Dunaway presents today for left arm.    Patient seen by provider today: Yes: ESTHER.   present during visit today: Not Applicable.    Concerns: No Concerns.    Procedure:  Lab draw site: LAC, Needle type: Butterfly, Gauge: 23.    Post Assessment:  Labs drawn without difficulty: Yes.    Discharge Plan:  Departure Mode: Ambulatory.    Face to Face Time: 4 min.    Shari Schoenberger, CMA

## 2022-06-15 NOTE — PATIENT INSTRUCTIONS
1. Continue revlimid and dexamethasone.  2. Resume Zometa 4 weeks after dental work is completed..  3. Take oral potassium.  4. See me in 2 months.  5. Labs as per treatment protocol.  6. Continue aspirin and acyclovir.

## 2022-06-15 NOTE — LETTER
6/15/2022         RE: Quiana Dunaway  [address] [facility] [address]        Dear Colleague,    Thank you for referring your patient, Quiana Dunaway, to the [facility]. Please see a copy of my visit note below.    HEMATOLOGY HISTORY: Mrs. Dunaway is a lady with multiple myeloma (IgG kappa). High risk, t(14;16).  1. On 05/01/2017, WBC of 3.4, hemoglobin of 10.2 and platelet of 154.  2. SPEP on 07/07/2017 revealed M-spike of 1.8.  3. Bone marrow biopsy on 07/12/2017 reveals kappa monotypic plasma cell population consistent with multiple myeloma.  Bone marrow is 70% cellular.  Plasma cell is 50-60%.     -There is gain of chromosome 1, 5, 9, 15, and 17.  There is translocation 14;16.   4.  On 07/18/2017:  -M-spike of 1.9.   -Immunofixation reveals monoclonal IgG kappa.    -IgG level of 2970.  IgA of 15 and IgM of 175.    -Kappa free light chain of 67.7.  Lambda free light chain of 1.42.  Ratio of kappa to lambda of 47.71.    -Beta 2 microglobulin of 3.4.   5. PET scan on 07/24/2017 reveals several focal areas of increased FDG uptake consistent with multiple myeloma.  There is probable epithelial cyst in tail of the pancreas.  No associated abnormal FDG activity.  Left thyroid cysts or nodules without any FDG activity.  There is a 0.3 cm left upper lobe lung nodule.   6.  Velcade, Revlimid and dexamethasone between on 08/14/2017 and 04/30/2018. Velcade stopped.   -Revlimid and dexamethasone continued.  -Revlimid held between 12/27/2021 and 03/08/2022.  7. CT chest, abdomen, and pelvis on 12/30/2021 does not reveal any malignancy.     SUBJECTIVE:  Ms. Dunaway is an 86-year-old female with multiple myeloma on revlimid and weekly dexamethasone.   Zometa is currently on hold as she is getting dental work.     Patient does not feel well when she is on Revlimid.  She gets more fatigued.  Appetite also decreases.  When she is off her Revlimid, she feels better.    She feels weak.   No headache.  Some lightheadedness.  No chest pain.  No shortness of breath.  No abdominal pain.  No nausea or vomiting.  Appetite has been decreased.  No urinary complaint.  No bowel problem.  No bleeding. All other review of system is negative.     PHYSICAL EXAMINATION:   GENERAL:  Alert and oriented x 3.   VITAL SIGNS:  Reviewed.  ECOG PS of 1.     EYES:  No icterus.   NECK:  Supple. No lymphadenopathy. No thyromegaly.   AXILLAE:  No lymphadenopathy.   LUNGS:  Good air entry bilaterally.  No crackles or wheezing.   HEART:  Regular.  No murmur.   GI: Abdomen is soft.  Nontender. No mass. Difficult palpation because of her weight.  EXTREMITIES:  No pedal edema.  No calf swelling or tenderness.   SKIN:  No rash.     LABORATORY: CBC, CMP and TSH reviewed. SPEP, IgG level and free light chains are pending.       ASSESSMENT:    1.  An 86-year-old female with multiple myeloma on Revlimid and weekly dexamethasone.  2.  Anemia, stable.  3.  Fatigue.  4.  Decreased appetite.  5.  Graves' disease.  Improving.     PLAN:    1.  Discussed with her regarding myeloma.  Myeloma labs from today are pending.  In between her kappa free light chain had increased significantly as she was off Revlimid.  Now she is taking Revlimid regularly.  She takes it for 2 weeks on and 1 week off.    If labs from today reveal improvement in myeloma, she will continue on current treatment of Revlimid and weekly dexamethasone.  If labs reveal progression of myeloma, then I will add daratumumab to Revlimid and dexamethasone.  She is agreeable for it.  Patient wants to avoid adding other treatment for myeloma as she is worried about new/different side effects from those medications.    Side effects of Revlimid reviewed.  She does get some side effect including increased fatigue and decreased appetite.  Patient said that this side effect is tolerable.    2.  She is completing dental work-up.  Once completed, we will resume zometa.    3.  She will  continue on aspirin and acyclovir.    4.  Her potassium is low.  She is supposed to be on oral potassium.  She has not been taking it regularly.  She will take 20 mEq a day for next 3 days.  After that 10 mEq a day.    5.  Patient has anemia which is overall stable.  We will continue to monitor it. It is mainly from Revlimid.    6.  TSH from today is normal.  She is on methimazole for hyperthyroidism.    7.  Patient had few questions which were all answered.  I will see her in 2 months time.  Advised her to call us with any questions or concerns.     TOTAL VISIT TIME: 40 minutes.  Time spent in today's visit, review of chart/investigations today, monitoring for toxicity of high risk medication and documentation today.      Again, thank you for allowing me to participate in the care of your patient.        Sincerely,        Alex Parry MD

## 2022-06-16 LAB
ALBUMIN SERPL ELPH-MCNC: 3.8 G/DL (ref 3.7–5.1)
ALPHA1 GLOB SERPL ELPH-MCNC: 0.3 G/DL (ref 0.2–0.4)
ALPHA2 GLOB SERPL ELPH-MCNC: 0.7 G/DL (ref 0.5–0.9)
B-GLOBULIN SERPL ELPH-MCNC: 0.6 G/DL (ref 0.6–1)
GAMMA GLOB SERPL ELPH-MCNC: 0.8 G/DL (ref 0.7–1.6)
IGG SERPL-MCNC: 956 MG/DL (ref 610–1616)
KAPPA LC FREE SER-MCNC: 44.6 MG/DL (ref 0.33–1.94)
KAPPA LC FREE/LAMBDA FREE SER NEPH: 84.15 {RATIO} (ref 0.26–1.65)
LAMBDA LC FREE SERPL-MCNC: 0.53 MG/DL (ref 0.57–2.63)
M PROTEIN SERPL ELPH-MCNC: 0.6 G/DL
PROT PATTERN SERPL ELPH-IMP: ABNORMAL

## 2022-06-16 PROCEDURE — 84165 PROTEIN E-PHORESIS SERUM: CPT | Mod: 26

## 2022-06-16 NOTE — RESULT ENCOUNTER NOTE
Dear Ms. Emelyn    Blood test reveals improvement in myeloma. Continue revlimid. No change in treatment needed at this time.    Please, call me with any questions.    Alex Parry MD

## 2022-06-17 ENCOUNTER — TELEPHONE (OUTPATIENT)
Dept: PHARMACY | Facility: CLINIC | Age: 87
End: 2022-06-17
Payer: MEDICARE

## 2022-06-17 NOTE — TELEPHONE ENCOUNTER
Oral Chemotherapy Monitoring Program   Medication: Revlimid  Rx: 10mg PO daily on days 1 through 14 of 21 day cycle   Auth #: [id number] obtained on 6/17/2022  Risk Category: adult female of non child bearing potential  Routine survey questions reviewed.   Rx to be Escribed to [facility]     Terrie Bowen [facility] Pharmacy Liaison  [phone]

## 2022-06-27 ENCOUNTER — MYC MEDICAL ADVICE (OUTPATIENT)
Dept: FAMILY MEDICINE | Facility: CLINIC | Age: 87
End: 2022-06-27

## 2022-06-27 ENCOUNTER — HOSPITAL ENCOUNTER (EMERGENCY)
Facility: CLINIC | Age: 87
Discharge: LEFT BEFORE DISCHARGE | End: 2022-06-27
Attending: EMERGENCY MEDICINE | Admitting: EMERGENCY MEDICINE
Payer: MEDICARE

## 2022-06-27 VITALS
RESPIRATION RATE: 16 BRPM | SYSTOLIC BLOOD PRESSURE: 151 MMHG | OXYGEN SATURATION: 97 % | HEART RATE: 80 BPM | TEMPERATURE: 98.5 F | DIASTOLIC BLOOD PRESSURE: 66 MMHG

## 2022-06-27 LAB
ALBUMIN SERPL-MCNC: 3.7 G/DL (ref 3.4–5)
ALP SERPL-CCNC: 65 U/L (ref 40–150)
ALT SERPL W P-5'-P-CCNC: 19 U/L (ref 0–50)
ANION GAP SERPL CALCULATED.3IONS-SCNC: 6 MMOL/L (ref 3–14)
AST SERPL W P-5'-P-CCNC: 12 U/L (ref 0–45)
ATRIAL RATE - MUSE: 67 BPM
BASOPHILS # BLD AUTO: 0 10E3/UL (ref 0–0.2)
BASOPHILS NFR BLD AUTO: 1 %
BILIRUB SERPL-MCNC: 0.6 MG/DL (ref 0.2–1.3)
BUN SERPL-MCNC: 18 MG/DL (ref 7–30)
CALCIUM SERPL-MCNC: 9.4 MG/DL (ref 8.5–10.1)
CHLORIDE BLD-SCNC: 108 MMOL/L (ref 94–109)
CO2 SERPL-SCNC: 27 MMOL/L (ref 20–32)
CREAT SERPL-MCNC: 0.66 MG/DL (ref 0.52–1.04)
DIASTOLIC BLOOD PRESSURE - MUSE: NORMAL MMHG
EOSINOPHIL # BLD AUTO: 0 10E3/UL (ref 0–0.7)
EOSINOPHIL NFR BLD AUTO: 0 %
ERYTHROCYTE [DISTWIDTH] IN BLOOD BY AUTOMATED COUNT: 13.2 % (ref 10–15)
GFR SERPL CREATININE-BSD FRML MDRD: 85 ML/MIN/1.73M2
GLUCOSE BLD-MCNC: 134 MG/DL (ref 70–99)
HCT VFR BLD AUTO: 35.2 % (ref 35–47)
HGB BLD-MCNC: 11.6 G/DL (ref 11.7–15.7)
HOLD SPECIMEN: NORMAL
IMM GRANULOCYTES # BLD: 0 10E3/UL
IMM GRANULOCYTES NFR BLD: 1 %
INTERPRETATION ECG - MUSE: NORMAL
LYMPHOCYTES # BLD AUTO: 0.7 10E3/UL (ref 0.8–5.3)
LYMPHOCYTES NFR BLD AUTO: 27 %
MCH RBC QN AUTO: 31.1 PG (ref 26.5–33)
MCHC RBC AUTO-ENTMCNC: 33 G/DL (ref 31.5–36.5)
MCV RBC AUTO: 94 FL (ref 78–100)
MONOCYTES # BLD AUTO: 0.3 10E3/UL (ref 0–1.3)
MONOCYTES NFR BLD AUTO: 13 %
NEUTROPHILS # BLD AUTO: 1.5 10E3/UL (ref 1.6–8.3)
NEUTROPHILS NFR BLD AUTO: 58 %
NRBC # BLD AUTO: 0 10E3/UL
NRBC BLD AUTO-RTO: 0 /100
P AXIS - MUSE: 65 DEGREES
PLATELET # BLD AUTO: 105 10E3/UL (ref 150–450)
POTASSIUM BLD-SCNC: 4.2 MMOL/L (ref 3.4–5.3)
PR INTERVAL - MUSE: 332 MS
PROT SERPL-MCNC: 7.1 G/DL (ref 6.8–8.8)
QRS DURATION - MUSE: 150 MS
QT - MUSE: 466 MS
QTC - MUSE: 492 MS
R AXIS - MUSE: -57 DEGREES
RBC # BLD AUTO: 3.73 10E6/UL (ref 3.8–5.2)
SODIUM SERPL-SCNC: 141 MMOL/L (ref 133–144)
SYSTOLIC BLOOD PRESSURE - MUSE: NORMAL MMHG
T AXIS - MUSE: 56 DEGREES
TROPONIN I SERPL HS-MCNC: 8 NG/L
VENTRICULAR RATE- MUSE: 67 BPM
WBC # BLD AUTO: 2.6 10E3/UL (ref 4–11)

## 2022-06-27 PROCEDURE — 93005 ELECTROCARDIOGRAM TRACING: CPT

## 2022-06-27 PROCEDURE — 999N000104 HC STATISTIC NO CHARGE

## 2022-06-27 PROCEDURE — 80053 COMPREHEN METABOLIC PANEL: CPT | Performed by: EMERGENCY MEDICINE

## 2022-06-27 PROCEDURE — 85025 COMPLETE CBC W/AUTO DIFF WBC: CPT | Performed by: EMERGENCY MEDICINE

## 2022-06-27 PROCEDURE — 84484 ASSAY OF TROPONIN QUANT: CPT | Performed by: EMERGENCY MEDICINE

## 2022-06-27 PROCEDURE — 36415 COLL VENOUS BLD VENIPUNCTURE: CPT | Performed by: EMERGENCY MEDICINE

## 2022-06-27 NOTE — ED TRIAGE NOTES
Pt reports new onset of SOB this morning when waking up. Cancer patient, receiving oral chemo. Denies new swelling in legs.      Triage Assessment     Row Name 06/27/22 7974       Respiratory WDL    Rhythm/Pattern, Respiratory shortness of breath       Cognitive/Neuro/Behavioral WDL    Cognitive/Neuro/Behavioral WDL all       [place] Coma Scale    Best Eye Response 4-->(E4) spontaneous    Best Motor Response 6-->(M6) obeys commands    Best Verbal Response 5-->(V5) oriented    [place] Coma Scale Score 15

## 2022-06-28 ENCOUNTER — NURSE TRIAGE (OUTPATIENT)
Dept: FAMILY MEDICINE | Facility: CLINIC | Age: 87
End: 2022-06-28
Payer: MEDICARE

## 2022-06-28 ENCOUNTER — PATIENT OUTREACH (OUTPATIENT)
Dept: ONCOLOGY | Facility: CLINIC | Age: 87
End: 2022-06-28

## 2022-06-28 ENCOUNTER — PATIENT OUTREACH (OUTPATIENT)
Dept: FAMILY MEDICINE | Facility: CLINIC | Age: 87
End: 2022-06-28

## 2022-06-28 ENCOUNTER — ANCILLARY PROCEDURE (OUTPATIENT)
Dept: GENERAL RADIOLOGY | Facility: CLINIC | Age: 87
End: 2022-06-28
Attending: INTERNAL MEDICINE
Payer: MEDICARE

## 2022-06-28 ENCOUNTER — OFFICE VISIT (OUTPATIENT)
Dept: PEDIATRICS | Facility: CLINIC | Age: 87
End: 2022-06-28
Attending: INTERNAL MEDICINE
Payer: MEDICARE

## 2022-06-28 ENCOUNTER — ANCILLARY PROCEDURE (OUTPATIENT)
Dept: CT IMAGING | Facility: CLINIC | Age: 87
End: 2022-06-28
Attending: INTERNAL MEDICINE
Payer: MEDICARE

## 2022-06-28 VITALS
DIASTOLIC BLOOD PRESSURE: 69 MMHG | BODY MASS INDEX: 24.69 KG/M2 | SYSTOLIC BLOOD PRESSURE: 118 MMHG | RESPIRATION RATE: 16 BRPM | WEIGHT: 135 LBS | HEART RATE: 83 BPM | TEMPERATURE: 99.2 F | OXYGEN SATURATION: 98 %

## 2022-06-28 DIAGNOSIS — R06.02 SHORTNESS OF BREATH: ICD-10-CM

## 2022-06-28 DIAGNOSIS — M79.675 PAIN OF TOE OF LEFT FOOT: ICD-10-CM

## 2022-06-28 DIAGNOSIS — D69.6 THROMBOCYTOPENIA (H): ICD-10-CM

## 2022-06-28 DIAGNOSIS — R26.89 BALANCE PROBLEMS: ICD-10-CM

## 2022-06-28 DIAGNOSIS — D70.9 NEUTROPENIA, UNSPECIFIED TYPE (H): ICD-10-CM

## 2022-06-28 DIAGNOSIS — R06.02 SHORTNESS OF BREATH: Primary | ICD-10-CM

## 2022-06-28 DIAGNOSIS — C90.00 MULTIPLE MYELOMA NOT HAVING ACHIEVED REMISSION (H): ICD-10-CM

## 2022-06-28 LAB
D DIMER PPP FEU-MCNC: 1.27 UG/ML FEU (ref 0–0.5)
ERYTHROCYTE [DISTWIDTH] IN BLOOD BY AUTOMATED COUNT: 13.4 % (ref 10–15)
HCT VFR BLD AUTO: 32.2 % (ref 35–47)
HGB BLD-MCNC: 10.8 G/DL (ref 11.7–15.7)
MCH RBC QN AUTO: 31.1 PG (ref 26.5–33)
MCHC RBC AUTO-ENTMCNC: 33.5 G/DL (ref 31.5–36.5)
MCV RBC AUTO: 93 FL (ref 78–100)
PLATELET # BLD AUTO: 107 10E3/UL (ref 150–450)
RBC # BLD AUTO: 3.47 10E6/UL (ref 3.8–5.2)
WBC # BLD AUTO: 3.4 10E3/UL (ref 4–11)

## 2022-06-28 PROCEDURE — 71275 CT ANGIOGRAPHY CHEST: CPT | Mod: MG | Performed by: RADIOLOGY

## 2022-06-28 PROCEDURE — 85027 COMPLETE CBC AUTOMATED: CPT | Performed by: INTERNAL MEDICINE

## 2022-06-28 PROCEDURE — 99207 REFERRAL TO ACUTE AND DIAGNOSTIC SERVICES: CPT | Performed by: INTERNAL MEDICINE

## 2022-06-28 PROCEDURE — 85379 FIBRIN DEGRADATION QUANT: CPT | Performed by: INTERNAL MEDICINE

## 2022-06-28 PROCEDURE — 73630 X-RAY EXAM OF FOOT: CPT | Mod: LT | Performed by: RADIOLOGY

## 2022-06-28 PROCEDURE — 36415 COLL VENOUS BLD VENIPUNCTURE: CPT | Performed by: INTERNAL MEDICINE

## 2022-06-28 PROCEDURE — G1010 CDSM STANSON: HCPCS | Mod: GC | Performed by: RADIOLOGY

## 2022-06-28 PROCEDURE — 99215 OFFICE O/P EST HI 40 MIN: CPT | Performed by: INTERNAL MEDICINE

## 2022-06-28 RX ORDER — IOPAMIDOL 755 MG/ML
57 INJECTION, SOLUTION INTRAVASCULAR ONCE
Status: COMPLETED | OUTPATIENT
Start: 2022-06-28 | End: 2022-06-28

## 2022-06-28 RX ADMIN — IOPAMIDOL 57 ML: 755 INJECTION, SOLUTION INTRAVASCULAR at 13:52

## 2022-06-28 NOTE — ED NOTES
Bed: ED02  Expected date:   Expected time:   Means of arrival:   Comments:  Emelyn   Subjective:       Patient ID: Salma Krishna is a 66 y.o. female.    Chief Complaint:  Follow-up      There is no problem list on file for this patient.      History of Present Illness  66 y.o. yo  here for an ultrasound due to a mass in LLQ that I palpated on BME at annual. Does have constipation.     U/s- 4 cm fibroid at fundus/left in retroverted uterus. This is the same size as what I palpated therefore I think this is what I was feeling, both ovaries are normal.     I explained to pt. Since no bleeding or pain then I think we do not need to do anything. Patient agrees. All questions answered      Past Medical History:   Diagnosis Date    Broken arm     right     Broken finger     left     Herpes simplex without mention of complication     Leiomyoma     Menopause        Past Surgical History:   Procedure Laterality Date    APPENDECTOMY      ENDOMETRIAL ABLATION      THYROIDECTOMY, PARTIAL         OB History    Para Term  AB Living   0   0         SAB TAB Ectopic Multiple Live Births                   No LMP recorded (lmp unknown). Patient is postmenopausal.   Date of Last Pap: 2019    Review of Systems  Review of Systems   Constitutional: Negative for fatigue and unexpected weight change.   Respiratory: Negative for shortness of breath.    Cardiovascular: Negative for chest pain.   Gastrointestinal: Negative for abdominal pain, constipation, diarrhea, nausea and vomiting.   Genitourinary: Negative for dysuria.   Musculoskeletal: Negative for back pain.   Skin: Negative for rash.   Neurological: Negative for headaches.   Hematological: Does not bruise/bleed easily.   Psychiatric/Behavioral: Negative for behavioral problems.        Objective:   Physical Exam:   Constitutional: She appears well-developed and well-nourished.                                  Assessment/ Plan:     1. Pelvic mass       4 cm fibroid    Follow-up with me in 1 year

## 2022-06-28 NOTE — TELEPHONE ENCOUNTER
"Second Level Triage routed to Primary Care Provider.    S: shortness of breath    B: 2d of \"hard to catch my breath\". Waited at Emergency Department last night for 3h then left without being seen. Stayed the night at son's house. Daughter-in-law reports patient slept fine.    A: Diagnosis of multiple myeloma.  Feels like I have to \"fight for air\" \"It woke me up two nights ago and I got a drink of water and that helped\".    No supplemental oxygen.    Able to speak in full sentences. \"I feel like there is phlegm back in my throat but I can't get it out\" \"Swallowing helps clear the way so I can breathe\".     7 weeks ago, flew (2.5h) to [place] non-stop. Stayed two days and flew home.    \"Had my heart checked out two weeks ago and the doctor said my heart sounded good\".    Denies: dizziness, chest pain, fever, cough, wheezing, cold symptoms,     P: Spoke with site triage nurse to request permission to use a \"same day\" appointment.    Recommendation is to \"second level triage\" with Dr. Chung. Urgent Care visit or same day/next day and MA/TC can assist to schedule in appropriate time slot.    Patient awaiting call back at [phone]      Reason for Disposition    MILD difficulty breathing (e.g., minimal/no SOB at rest, SOB with walking, pulse < 100) of new onset or worse than normal    Additional Information    Negative: Breathing stopped and hasn't returned    Negative: Choking on something    Negative: SEVERE difficulty breathing (e.g., struggling for each breath, speaks in single words, pulse > 120)    Negative: Bluish (or gray) lips or face    Negative: Difficult to awaken or acting confused (e.g., disoriented, slurred speech)    Negative: Passed out (i.e., fainted, collapsed and was not responding)    Negative: Wheezing started suddenly after medicine, an allergic food, or bee sting    Negative: Stridor    Negative: Slow, shallow and weak breathing    Negative: Sounds like a life-threatening emergency to the " "triager    Negative: Chest pain    Negative: Wheezing (high pitched whistling sound) and previous asthma attacks or use of asthma medicines    Negative: Difficulty breathing and only present when coughing    Negative: Difficulty breathing and only from stuffy or runny nose    Negative: Difficulty breathing and within 14 days of COVID-19 Exposure    Negative: MODERATE difficulty breathing (e.g., speaks in phrases, SOB even at rest, pulse 100-120) of new onset or worse than normal    Negative: Wheezing can be heard across the room    Negative: Drooling or spitting out saliva (because can't swallow)    Negative: Any history of prior \"blood clot\" in leg or lungs    Negative: Illness requiring prolonged bedrest in past month (e.g., immobilization, long hospital stay)    Negative: Hip or leg fracture (broken bone) in past month (or had cast on leg or ankle in past month)    Negative: Major surgery in the past month    Negative: Long-distance travel in past month (e.g., car, bus, train, plane; with trip lasting 6 or more hours)    Negative: Extra heart beats OR irregular heart beating   (i.e., \"palpitations\")    Negative: Fever > 103 F (39.4 C)    Negative: Fever > 101 F (38.3 C) and over 60 years of age    Negative: Fever > 100.0 F (37.8 C) and bedridden (e.g., nursing home patient, stroke, chronic illness, recovering from surgery)    Negative: Fever > 100.0 F (37.8 C) and diabetes mellitus or weak immune system (e.g., HIV positive, cancer chemo, splenectomy, organ transplant, chronic steroids)    Negative: Periods where breathing stops and then resumes normally and bedridden (e.g., nursing home patient, CVA)    Negative: Pregnant or postpartum (from 0 to 6 weeks after delivery)    Negative: Patient sounds very sick or weak to the triager    Protocols used: BREATHING DIFFICULTY-A-[place]      "

## 2022-06-28 NOTE — TELEPHONE ENCOUNTER
"Consent to communicate 8/10/2017    Daughter-in-law, Jackie calls to schedule an appointment at [facility] [place].     \"We were with her in the ER for three hours last night and then just took her home\". \"She slept fine but woke up again today with shortness of breath\".    This RN attempted to follow triage protocol, however, daughter is not with patient at present.    Daughter-in-law is going to call patient's \"cancer doctor\" to try and get an appointment.    This RN will call patient at [phone] (Home Phone) per daughter's suggestion.    Grace Solano RN   "

## 2022-06-28 NOTE — TELEPHONE ENCOUNTER
Writer received a call from Regi, patient's daughter in law concern about shortness of breath . She received a call from patient around 5pm yesterday regarding shortness of breath, no cough , or fever.  They went to ED and waited for 3 hours and decided to leave.    I stated that this shortness of breath still needs to be evaluated which could be many causes. A work up will need to be completed and treated.    Regi verbalized understanding and agreed to plan.    Alecia Briggs, RN

## 2022-06-28 NOTE — TELEPHONE ENCOUNTER
What type of discharge? Emergency Department  Risk of Hospital admission or ED visit: 65%  Is a TCM episode required? No  When should the patient follow up with PCP? within 30 days of discharge.    Brain aWlter RN  [facility]

## 2022-06-28 NOTE — PATIENT INSTRUCTIONS
PLAN:  1.  Start taking [place] Instant Breakfast daily.   Consider whey protein.  2.  Referral to Physical Therapy for balance therapy  3.  Follow-up with Dr. Parry and primary care as planned   4.  Consider a trial of breathing into a paper bag for a few minutes for episodes of shortness of breath.  Or, possibly try a half dose of lorazepam.

## 2022-06-28 NOTE — PROGRESS NOTES
ASSESSMENT:      1.  Nonspecific shortness of breath with normal chest x-ray, stable CBC and no pulmonary embolus by CT scan.     -  Differential diagnosis includes anxiety and other causes.    - no suggestion of pneumonia  - Patient has large umbilical hernia, but this has not changed per patient and seems less likely the cause of her symptoms.     - consider if home basement exposure could be contributing to symptoms, though she has no cough or wheezing  2.  Pain left foot/toes after fall.  L 5th toe fracture, supportive care planned.     - Patient uses cane and has some balance issues.     - No history of previous falls, but might benefit from PT  3.  Multiple myeloma, recent follow-up stable  - thrombocytopenia, most likely relate to MM.  No significant bleeding issues.  - mild leukopenia, largely stable, no infection suggested by today's evaluation  4.  Poor oral intake.   Weight appears to be down 3-5 lbs in the past few months.   This might well be from her MM, from medication, or another cause.  Doesn't like nutritional supplements, but has tolerated [place] Instant Breakfast      PLAN:  1.  Start taking [place] Instant Breakfast daily.   Consider whey protein.  2.  Referral to Physical Therapy for balance therapy - patient accepts  3.  Follow-up with Dr. Parry and primary care (Dr. Chung) as planned   4.  Consider a trial of breathing into a paper bag for a few minutes for episodes of shortness of breath.  Or, possibly try a half dose of lorazepam.      Dirk Araya is a 86 year old accompanied by her great niece., presenting for the following health issues:  No chief complaint on file.      HPI     Concern - Shortness of Breath  Onset: yesterday while laying in bed  Description: can't breath through nose but can breath through mouth, which is not usual for her.  Intensity: moderate  Progression of Symptoms:  same and intermittent  Accompanying Signs & Symptoms: denies chest pain,  pleurisy  Previous history of similar problem: Has had shortness since diagnosis of multiple myeloma. 5 years with diagnosis, mild. Shortness of breath now moderate, more severe than baseline.       Precipitating factors:        Worsened by: random, does not notice when it gets worse.  Denies problems talking.   Lives independently, walks around the house without dyspnea.   Spending time in bed as scared.  Alleviating factors:        Improved by: when drinking water or pop,  feels better and when opening up mouth to breath  Therapies tried and outcome:  none .       Denies difficulty swallowing, fever or flu like symptoms.  Some dampness was found in her basement, without clear mold.   Wonders if this could be related.  No coughing, phlegm, etc.  Denies significant anxiety, worry about her health.    Seen in ED yesterday by RN, had EKG and labs.   Left after > 3 hours as had not been seen by MD.      History of pneumonia several months ago, resolved    Review of Systems   Patient stumbled and fell a week ago, not sure what happened but L toes are painful since.   Getting better but some ongoing pain.   Wears slip on sandals, as comfortable and easier.    Denies leg swelling, pain except for toes.     Shoulder pains, used OTC analgesics.  Denies chest pain, palpitations, irregular pulse.j  No constitutional symptoms except nausea and poor appetite from chemo.    Perhaps some weight loss past few months, maybe 10+ lbs.  R eye bothering her, tearing more.  No genitourinary symptoms except frequency and mild leakage, rarely wears a pad.  otherwise negative     Never smoked      Objective    /69 (BP Location: Right arm, Cuff Size: Adult Small)   Pulse 83   Temp 99.2  F (37.3  C) (Oral)   Resp 16   SpO2 98%   There is no height or weight on file to calculate BMI.  Physical Exam   GENERAL: healthy, alert and no distress, patient missing many of her front teeth  NECK: no adenopathy, no asymmetry, masses, or scars  and thyroid normal to palpation  RESP: lungs clear to auscultation - no rales, rhonchi or wheezes  CV: regular rate and rhythm, Distant heart tones, no murmur, click or rub, no peripheral edema and peripheral pulses strong  ABDOMEN: Moderate umbilical hernia, nontender.   Otherwise soft, nontender, no hepatosplenomegaly, no masses and bowel sounds normal  MS: Tenderness and ecchymosis 2-5 MTP area, toes.  Remainder of foot normal, no other gross musculoskeletal defects noted, no edema    Results for orders placed or performed in visit on 06/28/22 (from the past 24 hour(s))   XR Foot Left G/E 3 Views    Narrative    Exam: 3 views of the left foot dated 6/28/2022.    COMPARISON: None.    CLINICAL HISTORY: Pain.    FINDINGS: AP, oblique, and lateral views of the left foot were  obtained. There is a transverse fracture involving the proximal aspect  of the left fifth toe proximal phalanx. No other fractures are seen.  The Lisfranc joint is congruent.      Impression    IMPRESSION: Transverse fracture involving the proximal aspect of the  left fifth toe proximal phalanx.    GADIEL BLACK MD         SYSTEM ID:  [id number]   XR Chest 2 Views    Narrative    EXAM: XR CHEST 2 VW  6/28/2022 12:46 PM     HISTORY:  Shortness of breath       COMPARISON:  Chest CT 12/30/2021, chest x-ray 12/15/2021    FINDINGS  Technique: Upright PA and lateral views of the chest.    Devices: Left chest cardiac device with leads projected over the the  right atrium and right ventricle.    The lungs are clear.  No discernible pneumothorax.  No definite  pleural effusion.     Heart size is within normal limits.   Pulmonary vasculature is  distinct.     No acute osseous abnormality. Degenerative changes with slight  scoliosis.  Gas distended bowel loops in the left upper quadrant.      Impression    IMPRESSION:     1. No acute cardiopulmonary findings.  2. Gas-distended bowel loops in the left upper quadrant, correlation  with abdominal symptoms  recommended.    I have personally reviewed the examination and initial interpretation  and I agree with the findings.    BELINDA CABEZAS MD         SYSTEM ID:  [id number]   CBC with platelets   Result Value Ref Range    WBC Count 3.4 (L) 4.0 - 11.0 10e3/uL    RBC Count 3.47 (L) 3.80 - 5.20 10e6/uL    Hemoglobin 10.8 (L) 11.7 - 15.7 g/dL    Hematocrit 32.2 (L) 35.0 - 47.0 %    MCV 93 78 - 100 fL    MCH 31.1 26.5 - 33.0 pg    MCHC 33.5 31.5 - 36.5 g/dL    RDW 13.4 10.0 - 15.0 %    Platelet Count 107 (L) 150 - 450 10e3/uL   D dimer quantitative   Result Value Ref Range    D-Dimer Quantitative 1.27 (H) 0.00 - 0.50 ug/mL FEU    Narrative    This D-dimer assay is intended for use in conjunction with a clinical pretest probability assessment model to exclude pulmonary embolism (PE) and deep venous thrombosis (DVT) in outpatients suspected of PE or DVT. The cut-off value is 0.50 ug/mL FEU.       CT Chest Pulmonary Embolism w Contrast    Result Date: 6/28/2022  EXAMINATION: CTA pulmonary angiogram, 6/28/2022 2:15 PM COMPARISON: CT 12/30/2021 HISTORY: Dyspnea in patient with multiple myeloma, rule out PE TECHNIQUE: Volumetric helical acquisition of CT images of the chest from the lung apices to the kidneys were acquired after the administration of 57 mL of Isovue-370 IV contrast. .  Post-processed multiplanar and/or MIP reformations were obtained, archived to PACS and used in interpretation of this study. FINDINGS:  Contrast bolus is: adequate.  Exam is negative  for acute pulmonary embolism. Mediastinum: No cardiomegaly. No pericardial effusion. Left chest wall cardiac device with leads in the right atrium and right ventricle. Normal caliber thoracic aorta and main pulmonary trunk. No suspicious lymphadenopathy in the chest. Calcified lower cervical lymph nodes. Enlarged and heterogeneous left thyroid lobe similar to previous exam. Lungs/pleura: The central airway is patent. No pneumothorax or effusions. No focal  consolidations. No new or enlarging pulmonary nodules. Calcified right upper lobe granuloma. Upper abdomen: Evaluation of the upper abdomen is limited. Small sliding-type hiatal hernia. Bones/soft tissues: Degenerative changes in the thoracic spine.     IMPRESSION: 1. Exam is negative for acute pulmonary embolism. 2. Atherosclerosis. 3. Enlarged left thyroid lobe nodule unchanged.  2. No acute findings within the chest to explain the patient's symptoms. I have personally reviewed the examination and initial interpretation and I agree with the findings. JAYA LAYTON MD   SYSTEM ID:  [id number]        I spent >60 minutes spent with patient, more than 50% spent on discussion/education/planning - as outlined in assessment and plan         .  ..

## 2022-06-28 NOTE — TELEPHONE ENCOUNTER
Good candidate for [facility] referral  I placed order  I can talk to provider too  Can you call Quiana or daughter in law and ask if they would be willing to go to [facility] today  If so, I will talk to provider           Provider Response to 2nd Level Triage Request    I have reviewed the RN documentation. My recommendation is:  Refer to Acute and Diagnostic Services (ADS) clinic.     Referral to Acute and Diagnostic Services          Day of Diagnosis services are indicated.  [facility] has been contacted at [phone] ([place]) to confirm patient acceptance.     The transition to Acute & Diagnostic Services Clinic has been discussed with patient, and she agrees with next level of care.  Patient understands that evaluation/treatment at [facility] typically takes significantly longer than in clinic/urgent care (>2 hours).    ADS Referral placed: Yes  Patient has transportation arranged and will travel to the [facility]S without delay: unkown    Patient aware not to eat or drink. No        Special issues:

## 2022-06-29 NOTE — TELEPHONE ENCOUNTER
See triage/ADS notes     Pt was not seen at ER, did initial tests but then left     Pt was seen more recently by ADS provider

## 2022-06-29 NOTE — TELEPHONE ENCOUNTER
Pt went to [facility] for this yesterday     See triage and [facility] encounters    Grace LIGHT, Triage RN  [facility]

## 2022-06-30 ENCOUNTER — NURSE TRIAGE (OUTPATIENT)
Dept: FAMILY MEDICINE | Facility: CLINIC | Age: 87
End: 2022-06-30

## 2022-06-30 NOTE — TELEPHONE ENCOUNTER
"PCP: please advise per triage below. Patient having mild SOB when at home (ie: with mold), feels better when out of house. Please advise. Per protocol, patient should be seen today. No OV today with PCP or team. Please advise on follow up oor referral?     Patients Reinaldo daughter in law , no C2C on file and Regi Dunaway, no C2C called writer . Patient is not available to give verbal approval. Writer notified both daughter's that  Writer can take information but cannot give out health information.       CC: Breathing issues  Background: patient seen in ER and [facility] this week for shortness of breath.   Patient: Still having issues- breathing issue, not worse.   Cause: concerned with environmental issues (ie: mold) in house causing shortness of breath, \"we smell the mold\"   Denies:wheezing, shortness of breath, unable to speak, severe coughing  Onset; Monday  No SOB: \"only can breath through her mouth\", \"capacity of oxygen can only come through mouth and not nose\"  Denies: dizziness, runny nose, cough, chest pain, fever  Severity: Mild difficulty breathing while in the house   Patient seen doing errands with patient's daughter in law - \"was talking on the phone without struggling, walking around the office without struggling\"  \"we smell the mold\"       Patient Callback: [phone]    Brain Walter RN  [facility]    Reason for Disposition    MILD difficulty breathing (e.g., minimal/no SOB at rest, SOB with walking, pulse < 100) of new onset or worse than normal    Additional Information    Negative: Breathing stopped and hasn't returned    Negative: Choking on something    Negative: SEVERE difficulty breathing (e.g., struggling for each breath, speaks in single words, pulse > 120)    Negative: Bluish (or gray) lips or face    Negative: Difficult to awaken or acting confused (e.g., disoriented, slurred speech)    Negative: Passed out (i.e., fainted, collapsed and was not responding)    Negative: " "Wheezing started suddenly after medicine, an allergic food, or bee sting    Negative: Stridor    Negative: Slow, shallow and weak breathing    Negative: Sounds like a life-threatening emergency to the triager    Negative: Chest pain    Negative: Wheezing (high pitched whistling sound) and previous asthma attacks or use of asthma medicines    Negative: Difficulty breathing and only present when coughing    Negative: Difficulty breathing and only from stuffy or runny nose    Negative: Difficulty breathing and within 14 days of COVID-19 Exposure    Negative: MODERATE difficulty breathing (e.g., speaks in phrases, SOB even at rest, pulse 100-120) of new onset or worse than normal    Negative: Wheezing can be heard across the room    Negative: Drooling or spitting out saliva (because can't swallow)    Negative: Any history of prior \"blood clot\" in leg or lungs    Negative: Illness requiring prolonged bedrest in past month (e.g., immobilization, long hospital stay)    Negative: Hip or leg fracture (broken bone) in past month (or had cast on leg or ankle in past month)    Negative: Major surgery in the past month    Negative: Long-distance travel in past month (e.g., car, bus, train, plane; with trip lasting 6 or more hours)    Negative: Extra heart beats OR irregular heart beating   (i.e., \"palpitations\")    Negative: Fever > 103 F (39.4 C)    Negative: Fever > 101 F (38.3 C) and over 60 years of age    Negative: Fever > 100.0 F (37.8 C) and bedridden (e.g., nursing home patient, stroke, chronic illness, recovering from surgery)    Negative: Fever > 100.0 F (37.8 C) and diabetes mellitus or weak immune system (e.g., HIV positive, cancer chemo, splenectomy, organ transplant, chronic steroids)    Negative: Periods where breathing stops and then resumes normally and bedridden (e.g., nursing home patient, CVA)    Negative: Pregnant or postpartum (from 0 to 6 weeks after delivery)    Negative: Patient sounds very sick or weak " to the triager    Protocols used: BREATHING DIFFICULTY-A-[place]

## 2022-06-30 NOTE — TELEPHONE ENCOUNTER
Spoke with patient and notified her of PCP response below     She can stay with her daughter away from the mold to see if that helps     Also said breathing into paper bag is helpful for breathing so she will continue that     Grace LIGHT, Triage RN  [facility]

## 2022-06-30 NOTE — TELEPHONE ENCOUNTER
Dr. 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

## 2022-07-07 DIAGNOSIS — C90.00 MULTIPLE MYELOMA NOT HAVING ACHIEVED REMISSION (H): Primary | ICD-10-CM

## 2022-07-07 RX ORDER — LENALIDOMIDE 10 MG/1
10 CAPSULE ORAL DAILY
Qty: 14 CAPSULE | Refills: 0 | Status: SHIPPED | OUTPATIENT
Start: 2022-07-07 | End: 2022-08-17

## 2022-07-07 RX ORDER — DEXAMETHASONE 4 MG/1
4 TABLET ORAL WEEKLY
Qty: 3 TABLET | Refills: 0 | Status: SHIPPED | OUTPATIENT
Start: 2022-07-07 | End: 2022-08-17

## 2022-07-08 ENCOUNTER — TELEPHONE (OUTPATIENT)
Dept: ONCOLOGY | Facility: CLINIC | Age: 87
End: 2022-07-08

## 2022-07-08 NOTE — TELEPHONE ENCOUNTER
Oral Chemotherapy Monitoring Program    Medication: Revlimid  Rx:  10mg PO daily on days 1-14 of 21 day cycle #14    Auth #:[id number]  Risk Category: Adult female NOT of reproductive capacity    Routine survey questions reviewed.    Thank you,    Sandra Saenz  Oncology/Transplant Float [email or web address]  Phone:[phone]  Fax:[phone]

## 2022-07-13 ENCOUNTER — LAB (OUTPATIENT)
Dept: INFUSION THERAPY | Facility: CLINIC | Age: 87
End: 2022-07-13
Attending: INTERNAL MEDICINE
Payer: MEDICARE

## 2022-07-13 ENCOUNTER — DOCUMENTATION ONLY (OUTPATIENT)
Dept: PHARMACY | Facility: CLINIC | Age: 87
End: 2022-07-13

## 2022-07-13 VITALS
SYSTOLIC BLOOD PRESSURE: 125 MMHG | BODY MASS INDEX: 24.84 KG/M2 | RESPIRATION RATE: 16 BRPM | HEART RATE: 93 BPM | HEIGHT: 62 IN | DIASTOLIC BLOOD PRESSURE: 77 MMHG | TEMPERATURE: 98.5 F | WEIGHT: 135 LBS

## 2022-07-13 DIAGNOSIS — C90.00 MULTIPLE MYELOMA NOT HAVING ACHIEVED REMISSION (H): Primary | ICD-10-CM

## 2022-07-13 DIAGNOSIS — R35.0 URINARY FREQUENCY: ICD-10-CM

## 2022-07-13 LAB
ALBUMIN SERPL-MCNC: 3.5 G/DL (ref 3.4–5)
ALP SERPL-CCNC: 65 U/L (ref 40–150)
ALT SERPL W P-5'-P-CCNC: 19 U/L (ref 0–50)
ANION GAP SERPL CALCULATED.3IONS-SCNC: 2 MMOL/L (ref 3–14)
AST SERPL W P-5'-P-CCNC: 14 U/L (ref 0–45)
BASOPHILS # BLD AUTO: 0 10E3/UL (ref 0–0.2)
BASOPHILS NFR BLD AUTO: 1 %
BILIRUB SERPL-MCNC: 0.9 MG/DL (ref 0.2–1.3)
BUN SERPL-MCNC: 25 MG/DL (ref 7–30)
CALCIUM SERPL-MCNC: 8.8 MG/DL (ref 8.5–10.1)
CHLORIDE BLD-SCNC: 107 MMOL/L (ref 94–109)
CO2 SERPL-SCNC: 29 MMOL/L (ref 20–32)
CREAT SERPL-MCNC: 0.74 MG/DL (ref 0.52–1.04)
EOSINOPHIL # BLD AUTO: 0.1 10E3/UL (ref 0–0.7)
EOSINOPHIL NFR BLD AUTO: 3 %
ERYTHROCYTE [DISTWIDTH] IN BLOOD BY AUTOMATED COUNT: 13.9 % (ref 10–15)
GFR SERPL CREATININE-BSD FRML MDRD: 79 ML/MIN/1.73M2
GLUCOSE BLD-MCNC: 85 MG/DL (ref 70–99)
HCT VFR BLD AUTO: 32.7 % (ref 35–47)
HGB BLD-MCNC: 10.6 G/DL (ref 11.7–15.7)
IMM GRANULOCYTES # BLD: 0 10E3/UL
IMM GRANULOCYTES NFR BLD: 0 %
LYMPHOCYTES # BLD AUTO: 1.3 10E3/UL (ref 0.8–5.3)
LYMPHOCYTES NFR BLD AUTO: 42 %
MCH RBC QN AUTO: 30.9 PG (ref 26.5–33)
MCHC RBC AUTO-ENTMCNC: 32.4 G/DL (ref 31.5–36.5)
MCV RBC AUTO: 95 FL (ref 78–100)
MONOCYTES # BLD AUTO: 0.5 10E3/UL (ref 0–1.3)
MONOCYTES NFR BLD AUTO: 16 %
NEUTROPHILS # BLD AUTO: 1.2 10E3/UL (ref 1.6–8.3)
NEUTROPHILS NFR BLD AUTO: 38 %
NRBC # BLD AUTO: 0 10E3/UL
NRBC BLD AUTO-RTO: 0 /100
PLATELET # BLD AUTO: 136 10E3/UL (ref 150–450)
POTASSIUM BLD-SCNC: 4 MMOL/L (ref 3.4–5.3)
PROT SERPL-MCNC: 6.7 G/DL (ref 6.8–8.8)
RBC # BLD AUTO: 3.43 10E6/UL (ref 3.8–5.2)
SODIUM SERPL-SCNC: 138 MMOL/L (ref 133–144)
TOTAL PROTEIN SERUM FOR ELP: 6.3 G/DL (ref 6.4–8.3)
WBC # BLD AUTO: 3.2 10E3/UL (ref 4–11)

## 2022-07-13 PROCEDURE — 83521 IG LIGHT CHAINS FREE EACH: CPT | Mod: 59 | Performed by: INTERNAL MEDICINE

## 2022-07-13 PROCEDURE — 82040 ASSAY OF SERUM ALBUMIN: CPT | Performed by: INTERNAL MEDICINE

## 2022-07-13 PROCEDURE — 82784 ASSAY IGA/IGD/IGG/IGM EACH: CPT | Performed by: INTERNAL MEDICINE

## 2022-07-13 PROCEDURE — 84155 ASSAY OF PROTEIN SERUM: CPT | Performed by: INTERNAL MEDICINE

## 2022-07-13 PROCEDURE — 96374 THER/PROPH/DIAG INJ IV PUSH: CPT

## 2022-07-13 PROCEDURE — 84165 PROTEIN E-PHORESIS SERUM: CPT | Mod: TC | Performed by: PATHOLOGY

## 2022-07-13 PROCEDURE — 258N000003 HC RX IP 258 OP 636: Performed by: INTERNAL MEDICINE

## 2022-07-13 PROCEDURE — 85025 COMPLETE CBC W/AUTO DIFF WBC: CPT | Performed by: INTERNAL MEDICINE

## 2022-07-13 PROCEDURE — 80053 COMPREHEN METABOLIC PANEL: CPT | Performed by: INTERNAL MEDICINE

## 2022-07-13 PROCEDURE — 250N000011 HC RX IP 250 OP 636: Performed by: INTERNAL MEDICINE

## 2022-07-13 PROCEDURE — 36415 COLL VENOUS BLD VENIPUNCTURE: CPT | Performed by: INTERNAL MEDICINE

## 2022-07-13 RX ADMIN — SODIUM CHLORIDE 250 ML: 9 INJECTION, SOLUTION INTRAVENOUS at 15:10

## 2022-07-13 RX ADMIN — ZOLEDRONIC ACID 3.3 MG: 4 INJECTION INTRAVENOUS at 15:10

## 2022-07-13 ASSESSMENT — PAIN SCALES - GENERAL: PAINLEVEL: NO PAIN (0)

## 2022-07-13 NOTE — PROGRESS NOTES
Oral Chemotherapy Monitoring Program  Lab Follow Up    Reviewed lab results from 07/13.    ORAL CHEMOTHERAPY 4/12/2021 4/16/2021 5/5/2021 7/7/2021 8/25/2021 11/29/2021 7/13/2022   Assessment Type Lab Monitoring Refill Lab Monitoring Quarterly Follow up Lab Monitoring Lab Monitoring Lab Monitoring   Diagnosis Code Multiple Myeloma Multiple Myeloma Multiple Myeloma Multiple Myeloma Multiple Myeloma Multiple Myeloma Multiple Myeloma   Providers Sohan Parry   Clinic Name/Location [facility]   Drug Name Revlimid (lenalidomide) Revlimid (lenalidomide) Revlimid (lenalidomide) Revlimid (lenalidomide) Revlimid (lenalidomide) Revlimid (lenalidomide) Revlimid (lenalidomide)   Dose 10 mg 10 mg 10 mg 10 mg 10 mg 10 mg 10 mg   Current Schedule Daily Daily Daily Daily Daily Daily Daily   Cycle Details 2 weeks on, 1 week off 2 weeks on, 1 week off 2 weeks on, 1 week off 2 weeks on, 1 week off 2 weeks on, 1 week off 2 weeks on, 1 week off 2 weeks on, 1 week off   Start Date of Last Cycle 4/1/2021 4/1/2021 4/22/2021 6/4/2021 - 11/12/2021 6/23/2022   Planned next cycle start date 4/22/2021 4/22/2021 5/13/2021 7/1/2021 - 12/3/2021 7/14/2022   Doses missed in last 2 weeks - - - - - - -   Adherence Assessment - - - Adherent - - -   Adverse Effects No AE identified during assessment - No AE identified during assessment No AE identified during assessment Thrombocytopenia - Anemia;Neutropenia;Thrombocytopenia   Anemia - - - - - - Grade 1   Pharmacist Intervention(anemia) - - - - - - No   Fatigue - - - - - - -   Pharmacist Intervention(fatigue) - - - - - - -   Neutropenia - - - - - - Grade 2   Pharmacist Intervention(neutropenia) - - - - - - No   Thrombocytopenia - - - - Grade 1 - Grade 1   Pharmacist Intervention(thrombocytopenia) - - - - No - No   Home BPs - - - not needed - - -   Any new drug interactions? - - - No - - -   Is the dose as  ordered appropriate for the patient? - - - Yes - - -   Is the patient currently in pain? - - - - - - -   Has the patient been assessed within the past 6 months for depression? - - - - - - -   Has the patient missed any days of school, work, or other routine activity? - - - - - - -   Since the last time we talked, have you been hospitalized or used the emergency room? - - - - - - -       Labs:  _  Result Component Current Result Ref Range   Sodium 138 (7/13/2022) 133 - 144 mmol/L     _  Result Component Current Result Ref Range   Potassium 4.0 (7/13/2022) 3.4 - 5.3 mmol/L     _  Result Component Current Result Ref Range   Calcium 8.8 (7/13/2022) 8.5 - 10.1 mg/dL     _  Result Component Current Result Ref Range   Albumin 3.5 (7/13/2022) 3.4 - 5.0 g/dL     _  Result Component Current Result Ref Range   Urea Nitrogen 25 (7/13/2022) 7 - 30 mg/dL     _  Result Component Current Result Ref Range   Creatinine 0.74 (7/13/2022) 0.52 - 1.04 mg/dL     _  Result Component Current Result Ref Range   AST 14 (7/13/2022) 0 - 45 U/L     _  Result Component Current Result Ref Range   ALT 19 (7/13/2022) 0 - 50 U/L     _  Result Component Current Result Ref Range   Bilirubin Total 0.9 (7/13/2022) 0.2 - 1.3 mg/dL     _  Result Component Current Result Ref Range   WBC Count 3.2 (L) (7/13/2022) 4.0 - 11.0 10e3/uL     _  Result Component Current Result Ref Range   Hemoglobin 10.6 (L) (7/13/2022) 11.7 - 15.7 g/dL     _  Result Component Current Result Ref Range   Platelet Count 136 (L) (7/13/2022) 150 - 450 10e3/uL     _  Result Component Current Result Ref Range   Absolute Neutrophils 1.2 (L) (7/13/2022) 1.6 - 8.3 10e3/uL        Assessment & Plan:  Results are concerning for Grade 2 Neutropenia. Ok to proceed with Revlimid.     Follow-Up:  08/17 Labs    [facility]  Pharmacy Intern  [facility]  [phone]

## 2022-07-13 NOTE — PROGRESS NOTES
Infusion Nursing Note:  Quiana Dunaway presents today for zometa.    Patient seen by provider today: No   present during visit today: Not Applicable.    Note: N/A.    Intravenous Access:  Peripheral IV placed.    Treatment Conditions:  Lab Results   Component Value Date     07/13/2022    POTASSIUM 4.0 07/13/2022    MAG 2.4 (H) 05/26/2020    CR 0.74 07/13/2022    HUMBERTO 8.8 07/13/2022    BILITOTAL 0.9 07/13/2022    ALBUMIN 3.5 07/13/2022    ALT 19 07/13/2022    AST 14 07/13/2022     Results reviewed, labs MET treatment parameters, ok to proceed with treatment.    Post Infusion Assessment:  Patient tolerated infusion without incident.  Blood return noted pre and post infusion.  Site patent and intact, free from redness, edema or discomfort.  No evidence of extravasations.  Access discontinued per protocol.     Discharge Plan:   Patient declined prescription refills.  Discharge instructions reviewed with: Patient.  Patient and/or family verbalized understanding of discharge instructions and all questions answered.  AVS to patient via [employer].  Patient will return 8/17/22 for next appointment.   Patient discharged in stable condition accompanied by: self.  Departure Mode: Ambulatory.      Lalita Palacios RN

## 2022-07-14 LAB
ALBUMIN SERPL ELPH-MCNC: 3.8 G/DL (ref 3.7–5.1)
ALPHA1 GLOB SERPL ELPH-MCNC: 0.3 G/DL (ref 0.2–0.4)
ALPHA2 GLOB SERPL ELPH-MCNC: 0.7 G/DL (ref 0.5–0.9)
B-GLOBULIN SERPL ELPH-MCNC: 0.6 G/DL (ref 0.6–1)
GAMMA GLOB SERPL ELPH-MCNC: 0.8 G/DL (ref 0.7–1.6)
IGG SERPL-MCNC: 886 MG/DL (ref 610–1616)
KAPPA LC FREE SER-MCNC: 51.3 MG/DL (ref 0.33–1.94)
KAPPA LC FREE/LAMBDA FREE SER NEPH: 67.5 {RATIO} (ref 0.26–1.65)
LAMBDA LC FREE SERPL-MCNC: 0.76 MG/DL (ref 0.57–2.63)
M PROTEIN SERPL ELPH-MCNC: 0.6 G/DL
PROT PATTERN SERPL ELPH-IMP: ABNORMAL

## 2022-07-14 PROCEDURE — 84165 PROTEIN E-PHORESIS SERUM: CPT | Mod: 26 | Performed by: PATHOLOGY

## 2022-07-15 RX ORDER — MIRABEGRON 50 MG/1
50 TABLET, EXTENDED RELEASE ORAL DAILY
Qty: 90 TABLET | Refills: 0 | OUTPATIENT
Start: 2022-07-15

## 2022-07-15 NOTE — TELEPHONE ENCOUNTER
Routing refill request to provider for review/approval because:  No recent appt, none scheduled.     Lowell JERNIGAN RN

## 2022-07-19 ENCOUNTER — OFFICE VISIT (OUTPATIENT)
Dept: FAMILY MEDICINE | Facility: CLINIC | Age: 87
End: 2022-07-19
Payer: MEDICARE

## 2022-07-19 VITALS
HEIGHT: 62 IN | BODY MASS INDEX: 25.21 KG/M2 | OXYGEN SATURATION: 95 % | SYSTOLIC BLOOD PRESSURE: 127 MMHG | HEART RATE: 81 BPM | TEMPERATURE: 97.1 F | DIASTOLIC BLOOD PRESSURE: 74 MMHG | WEIGHT: 137 LBS | RESPIRATION RATE: 18 BRPM

## 2022-07-19 DIAGNOSIS — E05.90: ICD-10-CM

## 2022-07-19 DIAGNOSIS — C90.00 MULTIPLE MYELOMA NOT HAVING ACHIEVED REMISSION (H): ICD-10-CM

## 2022-07-19 DIAGNOSIS — I10 BENIGN ESSENTIAL HYPERTENSION: ICD-10-CM

## 2022-07-19 DIAGNOSIS — H04.203 TEARING EYES: Primary | ICD-10-CM

## 2022-07-19 DIAGNOSIS — E05.20 TOXIC MULTINODUL GOITER: ICD-10-CM

## 2022-07-19 PROCEDURE — 99213 OFFICE O/P EST LOW 20 MIN: CPT | Performed by: INTERNAL MEDICINE

## 2022-07-19 RX ORDER — FUROSEMIDE 20 MG
10 TABLET ORAL DAILY PRN
Qty: 90 TABLET | Refills: 3 | Status: SHIPPED | OUTPATIENT
Start: 2022-07-19 | End: 2023-01-12

## 2022-07-19 RX ORDER — MIRABEGRON 50 MG/1
50 TABLET, EXTENDED RELEASE ORAL DAILY
Qty: 90 TABLET | Refills: 3 | Status: SHIPPED | OUTPATIENT
Start: 2022-07-19 | End: 2022-12-21

## 2022-07-19 RX ORDER — METHIMAZOLE 5 MG/1
2.5 TABLET ORAL DAILY
Qty: 45 TABLET | Refills: 3 | Status: SHIPPED | OUTPATIENT
Start: 2022-07-19 | End: 2023-08-28

## 2022-07-19 ASSESSMENT — PAIN SCALES - GENERAL: PAINLEVEL: NO PAIN (0)

## 2022-07-19 NOTE — PROGRESS NOTES
"CC:  Follow up myeloma, hypertension, dyspnea, eye problem, hyperthyroidism    S:  86 year old seen in [facility] for dyspnea  Work up was negative    Dyspnea improved when she left her home which apparently had mold in it    She is not living with family and does not have dyspnea    She complains of tearing in her right eye without pain, vision loss or itching.  Symptoms present for a week or two.      O:  Vital signs:  Temp: 97.1  F (36.2  C) Temp src: Temporal BP: 127/74 Pulse: 81   Resp: 18 SpO2: 95 %     Height: 157.5 cm (5' 2\") Weight: 62.1 kg (137 lb)  Estimated body mass index is 25.06 kg/m  as calculated from the following:    Height as of this encounter: 1.575 m (5' 2\").    Weight as of this encounter: 62.1 kg (137 lb).      GEN:  Well appearing no distress  HEENT:  EOMI, visual acuity in tact, PERRL, no obvious occular foreign bodies  CV:  Heart with regular rate and rhythm.   PULM:  The lungs are clear to auscultation bilaterally.     EXT:  No edema  NEURO:  Alert and oriented to person, place and time. Cranial nerves 2-12 appear grossly intact.   Uses walker.    PSYCH:  Appropriate mood and affect, well groomed, normal speech     A/P:       Tearing eyes  Apathetic thyrotoxicosis  Toxic multinodul goiter  Multiple myeloma not having achieved remission (H)  Benign essential hypertension      Recommended lubricating eye drops over the counter   If that does not help enough, then see ophthalmology  Continue methimazole, prescription refill provided, check TSH in October   Continue follow up with hematology oncology re myeloma which seems stable  Blood pressure well controlled     Follow up in September/October or sooner as needed    Total time:  26 minutes         "

## 2022-07-27 DIAGNOSIS — C90.00 MULTIPLE MYELOMA NOT HAVING ACHIEVED REMISSION (H): Primary | ICD-10-CM

## 2022-07-27 RX ORDER — LENALIDOMIDE 10 MG/1
10 CAPSULE ORAL DAILY
Qty: 14 CAPSULE | Refills: 0 | Status: SHIPPED | OUTPATIENT
Start: 2022-07-27 | End: 2022-08-17

## 2022-07-27 RX ORDER — DEXAMETHASONE 4 MG/1
4 TABLET ORAL WEEKLY
Qty: 3 TABLET | Refills: 0 | Status: SHIPPED | OUTPATIENT
Start: 2022-07-27 | End: 2022-08-17

## 2022-07-28 ENCOUNTER — TELEPHONE (OUTPATIENT)
Dept: PHARMACY | Facility: CLINIC | Age: 87
End: 2022-07-28

## 2022-07-28 NOTE — TELEPHONE ENCOUNTER
Oral Chemotherapy Monitoring Program   Medication: Revlimid  Rx: 10mg PO daily on days 1 through 14 of 21 day cycle   Auth #: [id number] Obtained on: 7/28/2022  Risk Category: adult female of non child bearing potential  Routine survey questions reviewed.   Rx to be Escribed to [facility]     Terrie Bowen [facility] Oncology Pharmacy Liaison  [phone]

## 2022-08-10 DIAGNOSIS — C90.00 MULTIPLE MYELOMA NOT HAVING ACHIEVED REMISSION (H): ICD-10-CM

## 2022-08-10 RX ORDER — LORAZEPAM 0.5 MG/1
0.5 TABLET ORAL EVERY 6 HOURS PRN
Qty: 30 TABLET | Refills: 3 | OUTPATIENT
Start: 2022-08-10

## 2022-08-12 DIAGNOSIS — C90.00 MULTIPLE MYELOMA NOT HAVING ACHIEVED REMISSION (H): ICD-10-CM

## 2022-08-12 RX ORDER — LORAZEPAM 0.5 MG/1
0.5 TABLET ORAL EVERY 6 HOURS PRN
Qty: 30 TABLET | Refills: 3 | Status: SHIPPED | OUTPATIENT
Start: 2022-08-12 | End: 2023-04-26

## 2022-08-12 NOTE — TELEPHONE ENCOUNTER
Last prescribing provider: Juaquin CRANE    Last clinic visit date: 6/15/22    Any missed appointments or no-shows since last clinic visit?: none    Recommendations for requested medication (if none, N/A): Take 1 tablet (0.5 mg) by mouth every 6 hours as needed for anxiety - Oral    Next clinic visit date: 8/17/22 Dr. Parry    Any other pertinent information (if none, N/A): None

## 2022-08-15 DIAGNOSIS — C90.00 MULTIPLE MYELOMA NOT HAVING ACHIEVED REMISSION (H): Primary | ICD-10-CM

## 2022-08-15 RX ORDER — DEXAMETHASONE 4 MG/1
4 TABLET ORAL WEEKLY
Qty: 3 TABLET | Refills: 0 | Status: SHIPPED | OUTPATIENT
Start: 2022-08-15 | End: 2022-10-26

## 2022-08-15 RX ORDER — LENALIDOMIDE 10 MG/1
10 CAPSULE ORAL DAILY
Qty: 14 CAPSULE | Refills: 0 | Status: SHIPPED | OUTPATIENT
Start: 2022-08-15 | End: 2022-11-21

## 2022-08-17 ENCOUNTER — ONCOLOGY VISIT (OUTPATIENT)
Dept: ONCOLOGY | Facility: CLINIC | Age: 87
End: 2022-08-17
Attending: INTERNAL MEDICINE
Payer: MEDICARE

## 2022-08-17 ENCOUNTER — LAB (OUTPATIENT)
Dept: INFUSION THERAPY | Facility: CLINIC | Age: 87
End: 2022-08-17
Attending: INTERNAL MEDICINE
Payer: MEDICARE

## 2022-08-17 VITALS
RESPIRATION RATE: 16 BRPM | HEART RATE: 85 BPM | WEIGHT: 135.8 LBS | BODY MASS INDEX: 24.84 KG/M2 | DIASTOLIC BLOOD PRESSURE: 69 MMHG | OXYGEN SATURATION: 95 % | SYSTOLIC BLOOD PRESSURE: 111 MMHG

## 2022-08-17 DIAGNOSIS — E05.90: ICD-10-CM

## 2022-08-17 DIAGNOSIS — C90.00 MULTIPLE MYELOMA NOT HAVING ACHIEVED REMISSION (H): Primary | ICD-10-CM

## 2022-08-17 DIAGNOSIS — K14.8 TONGUE LESION: ICD-10-CM

## 2022-08-17 LAB
ALBUMIN SERPL-MCNC: 3.3 G/DL (ref 3.4–5)
ALP SERPL-CCNC: 69 U/L (ref 40–150)
ALT SERPL W P-5'-P-CCNC: 17 U/L (ref 0–50)
ANION GAP SERPL CALCULATED.3IONS-SCNC: 3 MMOL/L (ref 3–14)
AST SERPL W P-5'-P-CCNC: 13 U/L (ref 0–45)
BASOPHILS # BLD AUTO: 0 10E3/UL (ref 0–0.2)
BASOPHILS NFR BLD AUTO: 1 %
BILIRUB SERPL-MCNC: 0.5 MG/DL (ref 0.2–1.3)
BUN SERPL-MCNC: 21 MG/DL (ref 7–30)
CALCIUM SERPL-MCNC: 8.7 MG/DL (ref 8.5–10.1)
CHLORIDE BLD-SCNC: 109 MMOL/L (ref 94–109)
CO2 SERPL-SCNC: 28 MMOL/L (ref 20–32)
CREAT SERPL-MCNC: 0.7 MG/DL (ref 0.52–1.04)
EOSINOPHIL # BLD AUTO: 0.1 10E3/UL (ref 0–0.7)
EOSINOPHIL NFR BLD AUTO: 4 %
ERYTHROCYTE [DISTWIDTH] IN BLOOD BY AUTOMATED COUNT: 13.6 % (ref 10–15)
GFR SERPL CREATININE-BSD FRML MDRD: 84 ML/MIN/1.73M2
GLUCOSE BLD-MCNC: 103 MG/DL (ref 70–99)
HCT VFR BLD AUTO: 33.1 % (ref 35–47)
HGB BLD-MCNC: 10.6 G/DL (ref 11.7–15.7)
IMM GRANULOCYTES # BLD: 0 10E3/UL
IMM GRANULOCYTES NFR BLD: 1 %
LYMPHOCYTES # BLD AUTO: 0.7 10E3/UL (ref 0.8–5.3)
LYMPHOCYTES NFR BLD AUTO: 20 %
MCH RBC QN AUTO: 30.6 PG (ref 26.5–33)
MCHC RBC AUTO-ENTMCNC: 32 G/DL (ref 31.5–36.5)
MCV RBC AUTO: 96 FL (ref 78–100)
MONOCYTES # BLD AUTO: 0.3 10E3/UL (ref 0–1.3)
MONOCYTES NFR BLD AUTO: 7 %
NEUTROPHILS # BLD AUTO: 2.5 10E3/UL (ref 1.6–8.3)
NEUTROPHILS NFR BLD AUTO: 67 %
NRBC # BLD AUTO: 0 10E3/UL
NRBC BLD AUTO-RTO: 0 /100
PLATELET # BLD AUTO: 149 10E3/UL (ref 150–450)
POTASSIUM BLD-SCNC: 3.7 MMOL/L (ref 3.4–5.3)
PROT SERPL-MCNC: 6.5 G/DL (ref 6.8–8.8)
RBC # BLD AUTO: 3.46 10E6/UL (ref 3.8–5.2)
SODIUM SERPL-SCNC: 140 MMOL/L (ref 133–144)
TOTAL PROTEIN SERUM FOR ELP: 6 G/DL (ref 6.4–8.3)
TSH SERPL DL<=0.005 MIU/L-ACNC: 0.98 MU/L (ref 0.4–4)
WBC # BLD AUTO: 3.7 10E3/UL (ref 4–11)

## 2022-08-17 PROCEDURE — 80053 COMPREHEN METABOLIC PANEL: CPT | Performed by: INTERNAL MEDICINE

## 2022-08-17 PROCEDURE — 83521 IG LIGHT CHAINS FREE EACH: CPT | Performed by: INTERNAL MEDICINE

## 2022-08-17 PROCEDURE — 82784 ASSAY IGA/IGD/IGG/IGM EACH: CPT | Performed by: INTERNAL MEDICINE

## 2022-08-17 PROCEDURE — 84155 ASSAY OF PROTEIN SERUM: CPT | Mod: 91 | Performed by: INTERNAL MEDICINE

## 2022-08-17 PROCEDURE — 36415 COLL VENOUS BLD VENIPUNCTURE: CPT

## 2022-08-17 PROCEDURE — 99214 OFFICE O/P EST MOD 30 MIN: CPT | Performed by: INTERNAL MEDICINE

## 2022-08-17 PROCEDURE — 84165 PROTEIN E-PHORESIS SERUM: CPT | Mod: TC | Performed by: PATHOLOGY

## 2022-08-17 PROCEDURE — 83520 IMMUNOASSAY QUANT NOS NONAB: CPT | Performed by: INTERNAL MEDICINE

## 2022-08-17 PROCEDURE — G0463 HOSPITAL OUTPT CLINIC VISIT: HCPCS

## 2022-08-17 PROCEDURE — 84443 ASSAY THYROID STIM HORMONE: CPT | Performed by: INTERNAL MEDICINE

## 2022-08-17 PROCEDURE — 85004 AUTOMATED DIFF WBC COUNT: CPT | Performed by: INTERNAL MEDICINE

## 2022-08-17 RX ORDER — TRIAMCINOLONE ACETONIDE 0.1 %
PASTE (GRAM) DENTAL
Qty: 5 G | Refills: 3 | Status: SHIPPED | OUTPATIENT
Start: 2022-08-17 | End: 2023-04-24

## 2022-08-17 ASSESSMENT — PAIN SCALES - GENERAL: PAINLEVEL: NO PAIN (0)

## 2022-08-17 NOTE — PROGRESS NOTES
"Oncology Rooming Note    August 17, 2022 2:26 PM   Quiana Dunaway is a 87 year old female who presents for:    Chief Complaint   Patient presents with     Oncology Clinic Visit     Initial Vitals: /69   Pulse 85   Resp 16   Wt 61.6 kg (135 lb 12.8 oz)   SpO2 95%   BMI 24.84 kg/m   Estimated body mass index is 24.84 kg/m  as calculated from the following:    Height as of 7/19/22: 1.575 m (5' 2\").    Weight as of this encounter: 61.6 kg (135 lb 12.8 oz). Body surface area is 1.64 meters squared.  No Pain (0) Comment: Data Unavailable   No LMP recorded. Patient is postmenopausal.  Allergies reviewed: Yes  Medications reviewed: Yes    Medications: Medication refills not needed today.  Pharmacy name entered into [employer]:    [facility] - [place], [place] - [address]  [id number] - [place], [place] - [address] AT [facility]    Clinical concerns:  doctor was notified.      Mariela Lyn CMA            "

## 2022-08-17 NOTE — PROGRESS NOTES
HEMATOLOGY HISTORY: Mrs. Dunaway is a lady with multiple myeloma (IgG kappa). High risk, t(14;16).  1. On 05/01/2017, WBC of 3.4, hemoglobin of 10.2 and platelet of 154.  2. SPEP on 07/07/2017 revealed M-spike of 1.8.  3. Bone marrow biopsy on 07/12/2017 reveals kappa monotypic plasma cell population consistent with multiple myeloma.  Bone marrow is 70% cellular.  Plasma cell is 50-60%.     -There is gain of chromosome 1, 5, 9, 15, and 17.  There is translocation 14;16.   4.  On 07/18/2017:  -M-spike of 1.9.   -Immunofixation reveals monoclonal IgG kappa.    -IgG level of 2970.  IgA of 15 and IgM of 175.    -Kappa free light chain of 67.7.  Lambda free light chain of 1.42.  Ratio of kappa to lambda of 47.71.    -Beta 2 microglobulin of 3.4.   5. PET scan on 07/24/2017 reveals several focal areas of increased FDG uptake consistent with multiple myeloma.  There is probable epithelial cyst in tail of the pancreas.  No associated abnormal FDG activity.  Left thyroid cysts or nodules without any FDG activity.  There is a 0.3 cm left upper lobe lung nodule.   6.  Velcade, Revlimid and dexamethasone between on 08/14/2017 and 04/30/2018. Velcade stopped.   -Revlimid and dexamethasone continued.  -Revlimid held between 12/27/2021 and 03/08/2022.  7. CT chest, abdomen, and pelvis on 12/30/2021 does not reveal any malignancy.     SUBJECTIVE:  Ms. Dunaway is an 87-year-old female with multiple myeloma on revlimid and weekly dexamethasone 4 mg.  Zometa is on hold as she is getting dental work.     Patient overall is doing well.  She has some fatigue which will go along her age.  No headache.  No dizziness.  No chest pain.  No shortness of breath.  No abdominal pain, nausea or vomiting.  Some back pain.  No urinary or bowel complaints.  No bleeding.  No recent infection.  Patient says that overall her condition has remained stable.  As per the daughter also, patient is tolerating her Revlimid well and her condition is remaining  stable.     PHYSICAL EXAMINATION:   GENERAL:  Alert and oriented x 3.   VITAL SIGNS:  Reviewed.       EYES:  No icterus.   NECK:  Supple. No lymphadenopathy. No thyromegaly.   AXILLAE:  No lymphadenopathy.   LUNGS:  Good air entry bilaterally.  No crackles or wheezing.   HEART:  Regular.  No murmur.   GI: Abdomen is soft.  Nontender. No mass. Difficult palpation because of her weight.  EXTREMITIES:  No pedal edema.  No calf swelling or tenderness.   SKIN:  No rash.     LABORATORY: CBC, CMP and TSH reviewed. SPEP, IgG level and free light chains are pending.       ASSESSMENT:    1.  An 86-year-old female with multiple myeloma on Revlimid and weekly dexamethasone.  2.   Pancytopenia, stable.  3.  Fatigue.  Stable.     PLAN:    1.  Discussed with her regarding myeloma.  Explained to her that labs done in July revealed myeloma to be overall stable    Patient will continue on Revlimid and weekly dexamethasone.  Overall she been tolerating it well. Patient does not want to change treatment unless necessary.  She is worried about side effects.  I told her that for now we will continue on Revlimid.     2.  Once dental work-up is completed, we will resume zometa.     3.  She will continue on aspirin and acyclovir.     4.  Her potassium is normal.  She will continue on oral potassium.    5.  Anemia is stable.  We will continue to monitor it.      6.  TSH is normal.  She is on methimazole for hyperthyroidism.     7.  Patient had few questions which were all answered.  I will see her in 2 months time.  Advised her to call us with any questions or concerns.     TOTAL VISIT TIME: 30 minutes.  Time spent in today's visit, review of chart/investigations today, monitoring for toxicity of high risk medication and documentation today.

## 2022-08-17 NOTE — PATIENT INSTRUCTIONS
1. Continue revlimid and dexamethasone.  2. Resume Zometa 4 weeks after dental work is completed.  3. Continue oral potassium.  4. See me in 2 months.  5. Labs as per treatment protocol.  6. Continue aspirin and acyclovir.

## 2022-08-17 NOTE — RESULT ENCOUNTER NOTE
The following letter pertains to your most recent diagnostic tests:    Good news! -TSH (thyroid stimulating hormone) level is normal which indicates normal circulating thyroid hormone levels.  No need to change the dose of your methimazole.          Sincerely,    Dr. Chung

## 2022-08-17 NOTE — LETTER
"    8/17/2022         RE: Quiana Dunaway  [address] [facility] [address]        Dear Colleague,    Thank you for referring your patient, Quiana Dunaway, to the [facility] [place]. Please see a copy of my visit note below.    Oncology Rooming Note    August 17, 2022 2:26 PM   Quiana Dunaway is a 87 year old female who presents for:    Chief Complaint   Patient presents with     Oncology Clinic Visit     Initial Vitals: /69   Pulse 85   Resp 16   Wt 61.6 kg (135 lb 12.8 oz)   SpO2 95%   BMI 24.84 kg/m   Estimated body mass index is 24.84 kg/m  as calculated from the following:    Height as of 7/19/22: 1.575 m (5' 2\").    Weight as of this encounter: 61.6 kg (135 lb 12.8 oz). Body surface area is 1.64 meters squared.  No Pain (0) Comment: Data Unavailable   No LMP recorded. Patient is postmenopausal.  Allergies reviewed: Yes  Medications reviewed: Yes    Medications: Medication refills not needed today.  Pharmacy name entered into [employer]:    [facility] - [place], [place] - [address] [id number] - [place], [place] - [address] AT [facility]    Clinical concerns:  doctor was notified.      Mariela Lyn, [facility]              HEMATOLOGY HISTORY: Mrs. Dunaway is a lady with multiple myeloma (IgG kappa). High risk, t(14;16).  1. On 05/01/2017, WBC of 3.4, hemoglobin of 10.2 and platelet of 154.  2. SPEP on 07/07/2017 revealed M-spike of 1.8.  3. Bone marrow biopsy on 07/12/2017 reveals kappa monotypic plasma cell population consistent with multiple myeloma.  Bone marrow is 70% cellular.  Plasma cell is 50-60%.     -There is gain of chromosome 1, 5, 9, 15, and 17.  There is translocation 14;16.   4.  On 07/18/2017:  -M-spike of 1.9.   -Immunofixation reveals monoclonal IgG kappa.    -IgG level of 2970.  IgA of 15 and IgM of 175.    -Kappa free light chain of 67.7.  Lambda free light chain of 1.42.  Ratio " of kappa to lambda of 47.71.    -Beta 2 microglobulin of 3.4.   5. PET scan on 07/24/2017 reveals several focal areas of increased FDG uptake consistent with multiple myeloma.  There is probable epithelial cyst in tail of the pancreas.  No associated abnormal FDG activity.  Left thyroid cysts or nodules without any FDG activity.  There is a 0.3 cm left upper lobe lung nodule.   6.  Velcade, Revlimid and dexamethasone between on 08/14/2017 and 04/30/2018. Velcade stopped.   -Revlimid and dexamethasone continued.  -Revlimid held between 12/27/2021 and 03/08/2022.  7. CT chest, abdomen, and pelvis on 12/30/2021 does not reveal any malignancy.     SUBJECTIVE:  Ms. Dunaway is an 87-year-old female with multiple myeloma on revlimid and weekly dexamethasone 4 mg.  Zometa is on hold as she is getting dental work.     Patient overall is doing well.  She has some fatigue which will go along her age.  No headache.  No dizziness.  No chest pain.  No shortness of breath.  No abdominal pain, nausea or vomiting.  Some back pain.  No urinary or bowel complaints.  No bleeding.  No recent infection.  Patient says that overall her condition has remained stable.  As per the daughter also, patient is tolerating her Revlimid well and her condition is remaining stable.     PHYSICAL EXAMINATION:   GENERAL:  Alert and oriented x 3.   VITAL SIGNS:  Reviewed.       EYES:  No icterus.   NECK:  Supple. No lymphadenopathy. No thyromegaly.   AXILLAE:  No lymphadenopathy.   LUNGS:  Good air entry bilaterally.  No crackles or wheezing.   HEART:  Regular.  No murmur.   GI: Abdomen is soft.  Nontender. No mass. Difficult palpation because of her weight.  EXTREMITIES:  No pedal edema.  No calf swelling or tenderness.   SKIN:  No rash.     LABORATORY: CBC, CMP and TSH reviewed. SPEP, IgG level and free light chains are pending.       ASSESSMENT:    1.  An 86-year-old female with multiple myeloma on Revlimid and weekly dexamethasone.  2.    Pancytopenia, stable.  3.  Fatigue.  Stable.     PLAN:    1.  Discussed with her regarding myeloma.  Explained to her that labs done in July revealed myeloma to be overall stable    Patient will continue on Revlimid and weekly dexamethasone.  Overall she been tolerating it well. Patient does not want to change treatment unless necessary.  She is worried about side effects.  I told her that for now we will continue on Revlimid.     2.  Once dental work-up is completed, we will resume zometa.     3.  She will continue on aspirin and acyclovir.     4.  Her potassium is normal.  She will continue on oral potassium.    5.  Anemia is stable.  We will continue to monitor it.      6.  TSH is normal.  She is on methimazole for hyperthyroidism.     7.  Patient had few questions which were all answered.  I will see her in 2 months time.  Advised her to call us with any questions or concerns.     TOTAL VISIT TIME: 30 minutes.  Time spent in today's visit, review of chart/investigations today, monitoring for toxicity of high risk medication and documentation today.      Again, thank you for allowing me to participate in the care of your patient.        Sincerely,        Alex Parry MD

## 2022-08-18 ENCOUNTER — TELEPHONE (OUTPATIENT)
Dept: PHARMACY | Facility: CLINIC | Age: 87
End: 2022-08-18

## 2022-08-18 LAB
ALBUMIN SERPL ELPH-MCNC: 3.7 G/DL (ref 3.7–5.1)
ALPHA1 GLOB SERPL ELPH-MCNC: 0.3 G/DL (ref 0.2–0.4)
ALPHA2 GLOB SERPL ELPH-MCNC: 0.7 G/DL (ref 0.5–0.9)
B-GLOBULIN SERPL ELPH-MCNC: 0.6 G/DL (ref 0.6–1)
GAMMA GLOB SERPL ELPH-MCNC: 0.8 G/DL (ref 0.7–1.6)
IGG SERPL-MCNC: 881 MG/DL (ref 610–1616)
KAPPA LC FREE SER-MCNC: 41.17 MG/DL (ref 0.33–1.94)
KAPPA LC FREE/LAMBDA FREE SER NEPH: 47.32 {RATIO} (ref 0.26–1.65)
LAMBDA LC FREE SERPL-MCNC: 0.87 MG/DL (ref 0.57–2.63)
M PROTEIN SERPL ELPH-MCNC: 0.5 G/DL
PROT PATTERN SERPL ELPH-IMP: ABNORMAL
TSH RECEP AB SER-ACNC: <1.1 IU/L (ref 0–1.75)

## 2022-08-18 PROCEDURE — 84165 PROTEIN E-PHORESIS SERUM: CPT | Mod: 26 | Performed by: PATHOLOGY

## 2022-08-18 NOTE — TELEPHONE ENCOUNTER
Oral Chemotherapy Monitoring Program   Medication: Revlimid  Rx: 10mg PO daily on days 1 through 14 of 21 day cycle   Auth #: [id number] Obtained on: 8/18/2022  Risk Category: adult female of non child bearing potential  Routine survey questions reviewed.   Rx to be Escribed to [facility]     Terrie Bowen [facility] Oncology Pharmacy Liaison  [phone]

## 2022-08-19 NOTE — RESULT ENCOUNTER NOTE
Dear Ms. Dunaway,    Harding Gill Tract free light chain is better at 41.17.    Please, call me with any questions.    Alex Parry MD

## 2022-08-24 ENCOUNTER — ANCILLARY PROCEDURE (OUTPATIENT)
Dept: CARDIOLOGY | Facility: CLINIC | Age: 87
End: 2022-08-24
Attending: INTERNAL MEDICINE
Payer: MEDICARE

## 2022-08-24 DIAGNOSIS — Z95.0 CARDIAC PACEMAKER IN SITU: ICD-10-CM

## 2022-08-24 LAB
MDC_IDC_EPISODE_DTM: NORMAL
MDC_IDC_EPISODE_DURATION: 1018 S
MDC_IDC_EPISODE_DURATION: 141 S
MDC_IDC_EPISODE_DURATION: 207 S
MDC_IDC_EPISODE_DURATION: 220 S
MDC_IDC_EPISODE_DURATION: 82 S
MDC_IDC_EPISODE_DURATION: 964 S
MDC_IDC_EPISODE_ID: 40
MDC_IDC_EPISODE_ID: 41
MDC_IDC_EPISODE_ID: 42
MDC_IDC_EPISODE_ID: 43
MDC_IDC_EPISODE_ID: 44
MDC_IDC_EPISODE_ID: 45
MDC_IDC_EPISODE_TYPE: NORMAL
MDC_IDC_LEAD_IMPLANT_DT: NORMAL
MDC_IDC_LEAD_IMPLANT_DT: NORMAL
MDC_IDC_LEAD_LOCATION: NORMAL
MDC_IDC_LEAD_LOCATION: NORMAL
MDC_IDC_LEAD_LOCATION_DETAIL_1: NORMAL
MDC_IDC_LEAD_LOCATION_DETAIL_1: NORMAL
MDC_IDC_LEAD_MFG: NORMAL
MDC_IDC_LEAD_MFG: NORMAL
MDC_IDC_LEAD_MODEL: NORMAL
MDC_IDC_LEAD_MODEL: NORMAL
MDC_IDC_LEAD_POLARITY_TYPE: NORMAL
MDC_IDC_LEAD_POLARITY_TYPE: NORMAL
MDC_IDC_LEAD_SERIAL: NORMAL
MDC_IDC_LEAD_SERIAL: NORMAL
MDC_IDC_MSMT_BATTERY_DTM: NORMAL
MDC_IDC_MSMT_BATTERY_REMAINING_LONGEVITY: 28 MO
MDC_IDC_MSMT_BATTERY_RRT_TRIGGER: 2.83
MDC_IDC_MSMT_BATTERY_STATUS: NORMAL
MDC_IDC_MSMT_BATTERY_VOLTAGE: 2.96 V
MDC_IDC_MSMT_LEADCHNL_RA_IMPEDANCE_VALUE: 323 OHM
MDC_IDC_MSMT_LEADCHNL_RA_IMPEDANCE_VALUE: 342 OHM
MDC_IDC_MSMT_LEADCHNL_RA_PACING_THRESHOLD_AMPLITUDE: 0.38 V
MDC_IDC_MSMT_LEADCHNL_RA_PACING_THRESHOLD_PULSEWIDTH: 0.4 MS
MDC_IDC_MSMT_LEADCHNL_RA_SENSING_INTR_AMPL: 3 MV
MDC_IDC_MSMT_LEADCHNL_RA_SENSING_INTR_AMPL: 3 MV
MDC_IDC_MSMT_LEADCHNL_RV_IMPEDANCE_VALUE: 380 OHM
MDC_IDC_MSMT_LEADCHNL_RV_IMPEDANCE_VALUE: 494 OHM
MDC_IDC_MSMT_LEADCHNL_RV_PACING_THRESHOLD_AMPLITUDE: 2 V
MDC_IDC_MSMT_LEADCHNL_RV_PACING_THRESHOLD_PULSEWIDTH: 0.4 MS
MDC_IDC_MSMT_LEADCHNL_RV_SENSING_INTR_AMPL: 6.38 MV
MDC_IDC_MSMT_LEADCHNL_RV_SENSING_INTR_AMPL: 6.38 MV
MDC_IDC_PG_IMPLANT_DTM: NORMAL
MDC_IDC_PG_MFG: NORMAL
MDC_IDC_PG_MODEL: NORMAL
MDC_IDC_PG_SERIAL: NORMAL
MDC_IDC_PG_TYPE: NORMAL
MDC_IDC_SESS_CLINIC_NAME: NORMAL
MDC_IDC_SESS_DTM: NORMAL
MDC_IDC_SESS_TYPE: NORMAL
MDC_IDC_SET_BRADY_AT_MODE_SWITCH_RATE: 171 {BEATS}/MIN
MDC_IDC_SET_BRADY_HYSTRATE: NORMAL
MDC_IDC_SET_BRADY_LOWRATE: 60 {BEATS}/MIN
MDC_IDC_SET_BRADY_MAX_SENSOR_RATE: 120 {BEATS}/MIN
MDC_IDC_SET_BRADY_MAX_TRACKING_RATE: 120 {BEATS}/MIN
MDC_IDC_SET_BRADY_MODE: NORMAL
MDC_IDC_SET_BRADY_PAV_DELAY_LOW: 300 MS
MDC_IDC_SET_BRADY_SAV_DELAY_LOW: 300 MS
MDC_IDC_SET_LEADCHNL_RA_PACING_AMPLITUDE: 1.5 V
MDC_IDC_SET_LEADCHNL_RA_PACING_ANODE_ELECTRODE_1: NORMAL
MDC_IDC_SET_LEADCHNL_RA_PACING_ANODE_LOCATION_1: NORMAL
MDC_IDC_SET_LEADCHNL_RA_PACING_CAPTURE_MODE: NORMAL
MDC_IDC_SET_LEADCHNL_RA_PACING_CATHODE_ELECTRODE_1: NORMAL
MDC_IDC_SET_LEADCHNL_RA_PACING_CATHODE_LOCATION_1: NORMAL
MDC_IDC_SET_LEADCHNL_RA_PACING_POLARITY: NORMAL
MDC_IDC_SET_LEADCHNL_RA_PACING_PULSEWIDTH: 0.4 MS
MDC_IDC_SET_LEADCHNL_RA_SENSING_ANODE_ELECTRODE_1: NORMAL
MDC_IDC_SET_LEADCHNL_RA_SENSING_ANODE_LOCATION_1: NORMAL
MDC_IDC_SET_LEADCHNL_RA_SENSING_CATHODE_ELECTRODE_1: NORMAL
MDC_IDC_SET_LEADCHNL_RA_SENSING_CATHODE_LOCATION_1: NORMAL
MDC_IDC_SET_LEADCHNL_RA_SENSING_POLARITY: NORMAL
MDC_IDC_SET_LEADCHNL_RA_SENSING_SENSITIVITY: 0.3 MV
MDC_IDC_SET_LEADCHNL_RV_PACING_AMPLITUDE: 4 V
MDC_IDC_SET_LEADCHNL_RV_PACING_ANODE_ELECTRODE_1: NORMAL
MDC_IDC_SET_LEADCHNL_RV_PACING_ANODE_LOCATION_1: NORMAL
MDC_IDC_SET_LEADCHNL_RV_PACING_CAPTURE_MODE: NORMAL
MDC_IDC_SET_LEADCHNL_RV_PACING_CATHODE_ELECTRODE_1: NORMAL
MDC_IDC_SET_LEADCHNL_RV_PACING_CATHODE_LOCATION_1: NORMAL
MDC_IDC_SET_LEADCHNL_RV_PACING_POLARITY: NORMAL
MDC_IDC_SET_LEADCHNL_RV_PACING_PULSEWIDTH: 0.4 MS
MDC_IDC_SET_LEADCHNL_RV_SENSING_ANODE_ELECTRODE_1: NORMAL
MDC_IDC_SET_LEADCHNL_RV_SENSING_ANODE_LOCATION_1: NORMAL
MDC_IDC_SET_LEADCHNL_RV_SENSING_CATHODE_ELECTRODE_1: NORMAL
MDC_IDC_SET_LEADCHNL_RV_SENSING_CATHODE_LOCATION_1: NORMAL
MDC_IDC_SET_LEADCHNL_RV_SENSING_POLARITY: NORMAL
MDC_IDC_SET_LEADCHNL_RV_SENSING_SENSITIVITY: 0.9 MV
MDC_IDC_SET_ZONE_DETECTION_INTERVAL: 350 MS
MDC_IDC_SET_ZONE_DETECTION_INTERVAL: 400 MS
MDC_IDC_SET_ZONE_TYPE: NORMAL
MDC_IDC_STAT_AT_BURDEN_PERCENT: 0 %
MDC_IDC_STAT_AT_DTM_END: NORMAL
MDC_IDC_STAT_AT_DTM_START: NORMAL
MDC_IDC_STAT_BRADY_AP_VP_PERCENT: 86.15 %
MDC_IDC_STAT_BRADY_AP_VS_PERCENT: 0.02 %
MDC_IDC_STAT_BRADY_AS_VP_PERCENT: 13.82 %
MDC_IDC_STAT_BRADY_AS_VS_PERCENT: 0.01 %
MDC_IDC_STAT_BRADY_DTM_END: NORMAL
MDC_IDC_STAT_BRADY_DTM_START: NORMAL
MDC_IDC_STAT_BRADY_RA_PERCENT_PACED: 86.13 %
MDC_IDC_STAT_BRADY_RV_PERCENT_PACED: 99.97 %
MDC_IDC_STAT_EPISODE_RECENT_COUNT: 0
MDC_IDC_STAT_EPISODE_RECENT_COUNT: 6
MDC_IDC_STAT_EPISODE_RECENT_COUNT_DTM_END: NORMAL
MDC_IDC_STAT_EPISODE_RECENT_COUNT_DTM_START: NORMAL
MDC_IDC_STAT_EPISODE_TOTAL_COUNT: 0
MDC_IDC_STAT_EPISODE_TOTAL_COUNT: 0
MDC_IDC_STAT_EPISODE_TOTAL_COUNT: 11
MDC_IDC_STAT_EPISODE_TOTAL_COUNT: 31
MDC_IDC_STAT_EPISODE_TOTAL_COUNT_DTM_END: NORMAL
MDC_IDC_STAT_EPISODE_TOTAL_COUNT_DTM_START: NORMAL
MDC_IDC_STAT_EPISODE_TYPE: NORMAL

## 2022-08-24 PROCEDURE — 93294 REM INTERROG EVL PM/LDLS PM: CPT | Performed by: INTERNAL MEDICINE

## 2022-08-24 PROCEDURE — 93296 REM INTERROG EVL PM/IDS: CPT | Performed by: INTERNAL MEDICINE

## 2022-09-07 DIAGNOSIS — C90.00 MULTIPLE MYELOMA NOT HAVING ACHIEVED REMISSION (H): Primary | ICD-10-CM

## 2022-09-08 RX ORDER — LENALIDOMIDE 10 MG/1
10 CAPSULE ORAL DAILY
Qty: 14 CAPSULE | Refills: 0 | Status: SHIPPED | OUTPATIENT
Start: 2022-09-08 | End: 2022-11-21

## 2022-09-08 RX ORDER — DEXAMETHASONE 4 MG/1
4 TABLET ORAL WEEKLY
Qty: 3 TABLET | Refills: 0 | Status: SHIPPED | OUTPATIENT
Start: 2022-09-08 | End: 2022-10-26

## 2022-09-09 ENCOUNTER — TELEPHONE (OUTPATIENT)
Dept: PHARMACY | Facility: CLINIC | Age: 87
End: 2022-09-09

## 2022-09-09 NOTE — TELEPHONE ENCOUNTER
Oral Chemotherapy Monitoring Program   Medication: Revlimid  Rx: 10mg PO daily on days 1 through 14 of 21 day cycle   Auth #: [id number] Obtained on:9/9/2022  Risk Category: adult female of non child bearing potential  Routine survey questions reviewed.   Rx to be Escribed to [facility]     Terrie Bowen [facility] Oncology Pharmacy Liaison  [phone]

## 2022-09-16 ENCOUNTER — LAB (OUTPATIENT)
Dept: INFUSION THERAPY | Facility: CLINIC | Age: 87
End: 2022-09-16
Attending: INTERNAL MEDICINE
Payer: MEDICARE

## 2022-09-16 ENCOUNTER — DOCUMENTATION ONLY (OUTPATIENT)
Dept: PHARMACY | Facility: CLINIC | Age: 87
End: 2022-09-16

## 2022-09-16 DIAGNOSIS — C90.00 MULTIPLE MYELOMA NOT HAVING ACHIEVED REMISSION (H): ICD-10-CM

## 2022-09-16 LAB
ALBUMIN SERPL-MCNC: 2.8 G/DL (ref 3.4–5)
ALP SERPL-CCNC: 68 U/L (ref 40–150)
ALT SERPL W P-5'-P-CCNC: 12 U/L (ref 0–50)
ANION GAP SERPL CALCULATED.3IONS-SCNC: 7 MMOL/L (ref 3–14)
AST SERPL W P-5'-P-CCNC: 6 U/L (ref 0–45)
BASOPHILS # BLD MANUAL: 0.1 10E3/UL (ref 0–0.2)
BASOPHILS NFR BLD MANUAL: 1 %
BILIRUB SERPL-MCNC: 0.5 MG/DL (ref 0.2–1.3)
BUN SERPL-MCNC: 20 MG/DL (ref 7–30)
CALCIUM SERPL-MCNC: 8.8 MG/DL (ref 8.5–10.1)
CHLORIDE BLD-SCNC: 107 MMOL/L (ref 94–109)
CO2 SERPL-SCNC: 26 MMOL/L (ref 20–32)
CREAT SERPL-MCNC: 0.93 MG/DL (ref 0.52–1.04)
ELLIPTOCYTES BLD QL SMEAR: ABNORMAL
EOSINOPHIL # BLD MANUAL: 0.1 10E3/UL (ref 0–0.7)
EOSINOPHIL NFR BLD MANUAL: 2 %
ERYTHROCYTE [DISTWIDTH] IN BLOOD BY AUTOMATED COUNT: 14.1 % (ref 10–15)
GFR SERPL CREATININE-BSD FRML MDRD: 60 ML/MIN/1.73M2
GLUCOSE BLD-MCNC: 105 MG/DL (ref 70–99)
HCT VFR BLD AUTO: 29.7 % (ref 35–47)
HGB BLD-MCNC: 9.5 G/DL (ref 11.7–15.7)
LYMPHOCYTES # BLD MANUAL: 1.1 10E3/UL (ref 0.8–5.3)
LYMPHOCYTES NFR BLD MANUAL: 17 %
MCH RBC QN AUTO: 29.7 PG (ref 26.5–33)
MCHC RBC AUTO-ENTMCNC: 32 G/DL (ref 31.5–36.5)
MCV RBC AUTO: 93 FL (ref 78–100)
METAMYELOCYTES # BLD MANUAL: 0.1 10E3/UL
METAMYELOCYTES NFR BLD MANUAL: 1 %
MONOCYTES # BLD MANUAL: 0.5 10E3/UL (ref 0–1.3)
MONOCYTES NFR BLD MANUAL: 8 %
NEUTROPHILS # BLD MANUAL: 4.8 10E3/UL (ref 1.6–8.3)
NEUTROPHILS NFR BLD MANUAL: 71 %
PLAT MORPH BLD: ABNORMAL
PLATELET # BLD AUTO: 138 10E3/UL (ref 150–450)
POTASSIUM BLD-SCNC: 3.7 MMOL/L (ref 3.4–5.3)
PROT SERPL-MCNC: 6.6 G/DL (ref 6.8–8.8)
RBC # BLD AUTO: 3.2 10E6/UL (ref 3.8–5.2)
RBC MORPH BLD: ABNORMAL
SODIUM SERPL-SCNC: 140 MMOL/L (ref 133–144)
WBC # BLD AUTO: 6.7 10E3/UL (ref 4–11)

## 2022-09-16 PROCEDURE — 36415 COLL VENOUS BLD VENIPUNCTURE: CPT

## 2022-09-16 PROCEDURE — 80053 COMPREHEN METABOLIC PANEL: CPT | Performed by: INTERNAL MEDICINE

## 2022-09-16 PROCEDURE — 85007 BL SMEAR W/DIFF WBC COUNT: CPT | Performed by: INTERNAL MEDICINE

## 2022-09-16 PROCEDURE — 85027 COMPLETE CBC AUTOMATED: CPT | Performed by: INTERNAL MEDICINE

## 2022-09-16 NOTE — PROGRESS NOTES
Oral Chemotherapy Program  Lab review     Reviewed labs from 9/16/22     Labs are unremarkable and do not require any dose adjustments at this time     Follow-up/plan  Continue lenalidomide 10 mg 2 weeks on, 1 week off  Will follow-up with visit with Sohan on 10/26.     Suzan Pedersen, Pharm. D., [facility]  9/16/2022

## 2022-09-19 ENCOUNTER — TELEPHONE (OUTPATIENT)
Dept: ONCOLOGY | Facility: CLINIC | Age: 87
End: 2022-09-19

## 2022-09-19 ENCOUNTER — OFFICE VISIT (OUTPATIENT)
Dept: FAMILY MEDICINE | Facility: CLINIC | Age: 87
End: 2022-09-19
Payer: MEDICARE

## 2022-09-19 VITALS
BODY MASS INDEX: 24.02 KG/M2 | SYSTOLIC BLOOD PRESSURE: 118 MMHG | HEART RATE: 82 BPM | OXYGEN SATURATION: 96 % | DIASTOLIC BLOOD PRESSURE: 70 MMHG | WEIGHT: 131.3 LBS

## 2022-09-19 DIAGNOSIS — R35.0 URINARY FREQUENCY: ICD-10-CM

## 2022-09-19 DIAGNOSIS — R63.4 ABNORMAL LOSS OF WEIGHT: ICD-10-CM

## 2022-09-19 DIAGNOSIS — R19.7 DIARRHEA, UNSPECIFIED TYPE: Primary | ICD-10-CM

## 2022-09-19 LAB
ALBUMIN UR-MCNC: 30 MG/DL
APPEARANCE UR: CLEAR
BACTERIA #/AREA URNS HPF: ABNORMAL /HPF
BILIRUB UR QL STRIP: NEGATIVE
COLOR UR AUTO: YELLOW
GLUCOSE UR STRIP-MCNC: NEGATIVE MG/DL
HGB UR QL STRIP: NEGATIVE
HYALINE CASTS #/AREA URNS LPF: ABNORMAL /LPF
KETONES UR STRIP-MCNC: NEGATIVE MG/DL
LEUKOCYTE ESTERASE UR QL STRIP: NEGATIVE
NITRATE UR QL: NEGATIVE
PH UR STRIP: 5.5 [PH] (ref 5–7)
RBC #/AREA URNS AUTO: ABNORMAL /HPF
SP GR UR STRIP: >=1.03 (ref 1–1.03)
SQUAMOUS #/AREA URNS AUTO: ABNORMAL /LPF
UROBILINOGEN UR STRIP-ACNC: 0.2 E.U./DL
WBC #/AREA URNS AUTO: ABNORMAL /HPF

## 2022-09-19 PROCEDURE — 81001 URINALYSIS AUTO W/SCOPE: CPT | Performed by: INTERNAL MEDICINE

## 2022-09-19 PROCEDURE — 99214 OFFICE O/P EST MOD 30 MIN: CPT | Performed by: INTERNAL MEDICINE

## 2022-09-19 RX ORDER — MIRTAZAPINE 7.5 MG/1
7.5 TABLET, FILM COATED ORAL AT BEDTIME
Qty: 90 TABLET | Refills: 3 | Status: SHIPPED | OUTPATIENT
Start: 2022-09-19 | End: 2022-11-21

## 2022-09-19 ASSESSMENT — PAIN SCALES - GENERAL: PAINLEVEL: NO PAIN (0)

## 2022-09-19 NOTE — RESULT ENCOUNTER NOTE
The following letter pertains to your most recent diagnostic tests:    Good news! The urine test does NOT show evidence for a urinary tract infection.       Sincerely,    Dr. Chung

## 2022-09-19 NOTE — TELEPHONE ENCOUNTER
Alex Parry MD  You; Alecia Briggs, RN 1 minute ago (11:52 AM)     BK    Delay starting revlimid by 1 week.     Alex Parry MD    Message text       Left voicemail asking Jackie/Quiana to call back for Dr. Parry's recommendations.

## 2022-09-19 NOTE — PROGRESS NOTES
Assessment & Plan     Diarrhea, unspecified type  Rule out C. Diff on steroids  - Clostridium difficile Toxin B PCR; Future  - Clostridium difficile Toxin B PCR    Abnormal loss of weight  Start below ; side effects and risks discussed  Short term follow up to see if it improved weight trend appetite   - mirtazapine (REMERON) 7.5 MG tablet; Take 1 tablet (7.5 mg) by mouth At Bedtime    Urinary frequency  Exclude UTI   - UA Macro with Reflex to Micro and Culture - lab collect; Future  - UA Macro with Reflex to Micro and Culture - lab collect      26 minutes spent on the date of the encounter doing chart review, history and exam, documentation and further activities per the note         Return in about 2 weeks (around 10/3/2022) for recheck OK to use same day or virtual slot if needed .  Patient instructed to return to clinic or contact us sooner if symptoms worsen or new symptoms develop.     César Chung MD  [facility] MAGALY Araya is a 87 year old accompanied by her FAMILY MEMBER, presenting for the following health issues:  Follow Up (Follow up visit per provider.)      HPI     Myeloma follow up   She has frequent loose liquid stools  She is losing weight  She has nausea without vomiting  No blood in stools  No fevers  Had urinary frequency on Saturday   No appetite  Felt better when she took lorazepam a few days ago         Review of Systems         Objective    /70 (BP Location: Left arm, Patient Position: Sitting, Cuff Size: Adult Regular)   Pulse 82   Wt 59.6 kg (131 lb 4.8 oz)   SpO2 96%   BMI 24.02 kg/m    Body mass index is 24.02 kg/m .  Physical Exam   GENERAL: healthy, alert and no distress  RESP: lungs clear to auscultation - no rales, rhonchi or wheezes  CV: Heart with regular rate and rhythm.   ABDOMEN: soft, nontender, no hepatosplenomegaly, no masses and bowel sounds normal  MS: no gross musculoskeletal defects noted, no edema  NEURO: Normal strength and  tone, mentation intact and speech normal  PSYCH: mentation appears normal, affect normal/bright    Recent CMP OK  Hemoglobin down a bit  Other counts stable  TSH was OK last check              7

## 2022-09-19 NOTE — TELEPHONE ENCOUNTER
Patient and her daughter called in to report symptoms. Symptoms started last week and include tiredness, lack of appetite, nausea, and weakness. Last week was her week off Revlimid. Now she is supposed to start a new cycle, and they want to know if they can delay a week until she is feeling better.     She reports she is taking multiple antiemetics. She has not vomited, just feels more nauseous than normal. She is able to eat and drink. Has been pushing Gatorade. Doesn't have an estimate of how much she's drinking. States she is urinating a normal amount, urine is yellow and cloudy (states her urine is usually cloudy). Also reports some diarrhea yesterday which she has been having. Has no appetite. She also feels very weak, but it still able to get up and walk. Denies shortness of breath, fever, cough, runny nose, chills.     Has an appointment with PCP today.     Will route to Dr. Parry for recommendations on Revlimid.    Stacia Roque, RN  Triage Nurse Advisor  [facility]

## 2022-09-19 NOTE — TELEPHONE ENCOUNTER
Jackie called back, discussed message as noted below, understanding verbalized. No further questions at this time.     Vanessa Bah, RN, BSN, PHN  [facility]

## 2022-09-21 DIAGNOSIS — I10 BENIGN ESSENTIAL HYPERTENSION: ICD-10-CM

## 2022-09-21 RX ORDER — LISINOPRIL 5 MG/1
5 TABLET ORAL DAILY
Qty: 90 TABLET | Refills: 3 | Status: SHIPPED | OUTPATIENT
Start: 2022-09-21 | End: 2023-01-12

## 2022-09-21 NOTE — TELEPHONE ENCOUNTER
Prescription approved per [facility] Refill Protocol.    Jennifer Ngo, RN BSN MSN  [facility]

## 2022-09-22 ENCOUNTER — PATIENT OUTREACH (OUTPATIENT)
Dept: ONCOLOGY | Facility: CLINIC | Age: 87
End: 2022-09-22

## 2022-09-22 NOTE — PROGRESS NOTES
received a VM from Jackie, requesting a return call.    Willr returned call and spoke with Jackie who states that she hoped with the being off of Revlimid that patient would be bouncing back as far as energy, appetite, and is not. Patient has some body aches as well. They had an appointment with PCP this Monday and no acute concerning findings. Remeron was prescribed as the patient has been having trouble sleeping, which will be shipped today.     Patient denies, fever, cough , nausea, vomiting, or diarrhea.  Willr advised starting the Remeron this evening and see if better sleep doesn't help with having more energy during the day.    Jackie verbalized understanding and agreed.     Alecia Briggs RN

## 2022-09-26 DIAGNOSIS — C90.00 MULTIPLE MYELOMA NOT HAVING ACHIEVED REMISSION (H): Primary | ICD-10-CM

## 2022-09-27 DIAGNOSIS — C90.00 MULTIPLE MYELOMA NOT HAVING ACHIEVED REMISSION (H): Primary | ICD-10-CM

## 2022-09-27 DIAGNOSIS — C90.00 MULTIPLE MYELOMA NOT HAVING ACHIEVED REMISSION (H): ICD-10-CM

## 2022-09-27 RX ORDER — LENALIDOMIDE 10 MG/1
10 CAPSULE ORAL DAILY
Qty: 14 CAPSULE | Refills: 0 | Status: SHIPPED | OUTPATIENT
Start: 2022-09-27 | End: 2022-11-21

## 2022-09-27 RX ORDER — DEXAMETHASONE 4 MG/1
4 TABLET ORAL WEEKLY
Qty: 3 TABLET | Refills: 0 | Status: SHIPPED | OUTPATIENT
Start: 2022-09-27 | End: 2022-10-26

## 2022-09-27 RX ORDER — ACYCLOVIR 400 MG/1
400 TABLET ORAL 2 TIMES DAILY
Qty: 60 TABLET | Refills: 5 | Status: SHIPPED | OUTPATIENT
Start: 2022-09-27 | End: 2023-04-03

## 2022-09-29 ENCOUNTER — TELEPHONE (OUTPATIENT)
Dept: PHARMACY | Facility: CLINIC | Age: 87
End: 2022-09-29

## 2022-09-29 NOTE — ORAL ONC MGMT
Oral Chemotherapy Monitoring Program.    Patient currently on lenalidomide therapy.    Regi called today to let us know that Quiana's cycle was delayed due to her being more fatigued in her off week. She was due to start on 9/19 but planned on delaying it until 9/26. She still wasn't quite back to normal so is pushing her start to 10/3. Regi said she was tired, not sleeping well at night, not much of an appetite and not much energy. Since starting mirtazipine recently this has helped with all of the above. She will resume her revlimid again on 10/3.    Regi will call again if the mirtazipine does not seem to be working as well and she is having increased fatigue and lethargy.    Debra Ortega PharmD  September 29, 2022

## 2022-09-29 NOTE — TELEPHONE ENCOUNTER
Oral Chemotherapy Monitoring Program   Medication: Revlimid  Rx: 10mg PO daily on days 1 through 14 of 21 day cycle   Auth #: [id number] Obtained on: 9/29/2022  Risk Category: adult female of non child bearing potential  Routine survey questions reviewed.   Rx to be Escribed to [facility]     Terrie Bowen [facility] Oncology Pharmacy Liaison  [phone]

## 2022-10-06 ENCOUNTER — OFFICE VISIT (OUTPATIENT)
Dept: FAMILY MEDICINE | Facility: CLINIC | Age: 87
End: 2022-10-06
Payer: MEDICARE

## 2022-10-06 VITALS
WEIGHT: 134 LBS | DIASTOLIC BLOOD PRESSURE: 55 MMHG | RESPIRATION RATE: 16 BRPM | TEMPERATURE: 97 F | HEIGHT: 62 IN | SYSTOLIC BLOOD PRESSURE: 117 MMHG | HEART RATE: 87 BPM | BODY MASS INDEX: 24.66 KG/M2 | OXYGEN SATURATION: 96 %

## 2022-10-06 DIAGNOSIS — Z23 HIGH PRIORITY FOR 2019-NCOV VACCINE: ICD-10-CM

## 2022-10-06 DIAGNOSIS — Z23 NEED FOR PROPHYLACTIC VACCINATION AND INOCULATION AGAINST INFLUENZA: ICD-10-CM

## 2022-10-06 DIAGNOSIS — K42.9 UMBILICAL HERNIA WITHOUT OBSTRUCTION AND WITHOUT GANGRENE: ICD-10-CM

## 2022-10-06 DIAGNOSIS — R63.4 ABNORMAL LOSS OF WEIGHT: Primary | ICD-10-CM

## 2022-10-06 PROCEDURE — 91313 COVID-19,PF,MODERNA BIVALENT: CPT | Performed by: INTERNAL MEDICINE

## 2022-10-06 PROCEDURE — 99213 OFFICE O/P EST LOW 20 MIN: CPT | Mod: 25 | Performed by: INTERNAL MEDICINE

## 2022-10-06 PROCEDURE — 90662 IIV NO PRSV INCREASED AG IM: CPT | Performed by: INTERNAL MEDICINE

## 2022-10-06 PROCEDURE — G0008 ADMIN INFLUENZA VIRUS VAC: HCPCS | Mod: 59 | Performed by: INTERNAL MEDICINE

## 2022-10-06 PROCEDURE — 0134A COVID-19,PF,MODERNA BIVALENT: CPT | Performed by: INTERNAL MEDICINE

## 2022-10-06 ASSESSMENT — PAIN SCALES - GENERAL: PAINLEVEL: NO PAIN (0)

## 2022-10-06 NOTE — PROGRESS NOTES
"  Assessment & Plan     Abnormal loss of weight  Improving on remeron and sleep is improving too     Umbilical hernia without obstruction and without gangrene  We discussed referral to seek surgical repair  She does not want to do this  We discussed red-flag symptoms that would necessitate immediate medical attention     High priority for 2019-nCoV vaccine    - COVID-19,PF,MODERNA BIVALENT 18+Yrs    Need for prophylactic vaccination and inoculation against influenza    - INFLUENZA, QUAD, HIGH DOSE, PF, 65YR + (FLUZONE HD)      15 minutes spent on the date of the encounter doing chart review, history and exam, documentation and further activities per the note           Return in about 6 weeks (around 11/17/2022) for recheck 4-6 weeks ok to use same day or approval only slot.    César Chung MD  [facility] MAGALY Araya is a 87 year old accompanied by her daugher in law, presenting for the following health issues:  Follow Up, Imm/Inj (COVID-19 VACCINE), and Imm/Inj (Flu Shot)      HPI       Remeron is helping sleep and appetite   Patient and family very happy  Noted bulge in abdomen several months ago not tender  No nausea or vomiting      Review of Systems         Objective    /55 (BP Location: Left arm, Patient Position: Chair, Cuff Size: Adult Large)   Pulse 87   Temp 97  F (36.1  C) (Tympanic)   Resp 16   Ht 1.575 m (5' 2\")   Wt 60.8 kg (134 lb)   SpO2 96%   Breastfeeding No   BMI 24.51 kg/m    Body mass index is 24.51 kg/m .  Physical Exam   GENERAL: healthy, alert and no distress  RESP: lungs clear to auscultation - no rales, rhonchi or wheezes  CV: regular rate and rhythm, normal S1 S2, no S3 or S4, no murmur, click or rub, no peripheral edema and peripheral pulses strong  ABDOMEN: soft, nontender, no hepatosplenomegaly, no masses and bowel sounds normal; umbilical hernia noted  NEURO: Normal strength and tone, mentation intact and speech normal  PSYCH: mentation " appears normal, affect normal/bright

## 2022-10-06 NOTE — NURSING NOTE
Prior to immunization administration, verified patients identity using patient s name and date of birth. Please see Immunization Activity for additional information.     Screening Questionnaire for Adult Immunization    Are you sick today?   No   Do you have allergies to medications, food, a vaccine component or latex?   No   Have you ever had a serious reaction after receiving a vaccination?   No   Do you have a long-term health problem with heart, lung, kidney, or metabolic disease (e.g., diabetes), asthma, a blood disorder, no spleen, complement component deficiency, a cochlear implant, or a spinal fluid leak?  Are you on long-term aspirin therapy?   No   Do you have cancer, leukemia, HIV/AIDS, or any other immune system problem?   No   Do you have a parent, brother, or sister with an immune system problem?   No   In the past 3 months, have you taken medications that affect  your immune system, such as prednisone, other steroids, or anticancer drugs; drugs for the treatment of rheumatoid arthritis, Crohn s disease, or psoriasis; or have you had radiation treatments?   No   Have you had a seizure, or a brain or other nervous system problem?   No   During the past year, have you received a transfusion of blood or blood    products, or been given immune (gamma) globulin or antiviral drug?   No   For women: Are you pregnant or is there a chance you could become       pregnant during the next month?   No   Have you received any vaccinations in the past 4 weeks?   No     Immunization questionnaire answers were all negative.        Per orders of Dr. Chung, injection of flu, moderna given by Lauren Barrios CMA. Patient instructed to remain in clinic for 15 minutes afterwards, and to report any adverse reaction to me immediately.       Screening performed by Lauren Barrios CMA on 10/6/2022 at 4:28 PM.

## 2022-10-12 ENCOUNTER — TELEPHONE (OUTPATIENT)
Dept: ONCOLOGY | Facility: CLINIC | Age: 87
End: 2022-10-12

## 2022-10-12 NOTE — TELEPHONE ENCOUNTER
S-(situation): Two known episodes of mucous and bloody rectal discharge noted by patient's granddaughter, Mer who is a nurse. One episode on Monday and one incontinent episode on Tuesday.      B-(background): Mer and Regi reported that the patient had been constipated and had used Dulcolax on Monday 10/10. The patient experienced nausea, sweating and feeling ill while straining to have a bowel movement on Monday. Symptoms resolved with hydration and rest. Patient proceeded to use the bathroom several more times but the family is unclear if she passed any stool. The patient has wanted her independence and does not always allow them in the bathroom. It is unclear when the patient last had a regular BM.    Patient completed 5 days of antibiotics on 10/5 for recent dental work.     A-(assessment): Patient has ongoing shortness of breath, fatigue, and weakness that is neither better or worse this week. Her appetite was decreased the past two days but she was able to eat breakfast this morning. No nausea at this time.     R-(recommendations): Message sent to Dr. Sohan Vinson RN on 10/12/2022 at 2:53 PM  Dr. Parry recommended that the patient contact her PCP for blood in the stool. Writer informed Regi of these instructions.  Leona Vinson RN on 10/12/2022 at 3:56 PM

## 2022-10-12 NOTE — TELEPHONE ENCOUNTER
Patient's daughter in law, Regi, called to report that after being constipated on Monday she had a stool on Tuesday that contained blood and mucous.      A niece was with the patient at that time. Regi was not able to answer any further questions at this time regarding further episode of bleeding or other symptoms since she was not with the patient.     Regi with be in contact with the patient and her family. Triage to call her back at 1:45 today.  Leona Vinson RN on 10/12/2022 at 12:36 PM    I attempted to reach Regi in follow up. She did not have any further information at the time of my call and was unable to contact her niece. She will call back to the office with further information.  Leona Vinson RN on 10/12/2022 at 2:01 PM

## 2022-10-14 NOTE — LETTER
"    4/14/2021         RE: Quiana Dunaway  [address] [facility] [address]        Dear Colleague,    Thank you for referring your patient, Quiana Dunaway, to the [facility] [place]. Please see a copy of my visit note below.    Oncology Rooming Note    April 14, 2021 2:49 PM   Quiana Dunaway is a 85 year old female who presents for:    Chief Complaint   Patient presents with     Oncology Clinic Visit     Initial Vitals: /81   Pulse 87   Temp 99  F (37.2  C) (Oral)   Resp 16   SpO2 95%  Estimated body mass index is 25.97 kg/m  as calculated from the following:    Height as of 1/14/21: 1.575 m (5' 2\").    Weight as of 3/16/21: 64.4 kg (142 lb). There is no height or weight on file to calculate BSA.  No Pain (0) Comment: Data Unavailable   No LMP recorded. Patient is postmenopausal.  Allergies reviewed: Yes  Medications reviewed: Yes    Medications: MEDICATION REFILLS NEEDED TODAY. Provider was notified.  Pharmacy name entered into [employer]:    [facility] - [place], [place] - [address]  [id number] - [place], [place] - [address] AT [facility] [address]    Clinical concerns: refills of Lorazepam? also Furosemide -     Lorazepam is not helping with sleep - up several times a night.      Shari J. Schoenberger, [facility]                  Oncology/Hematology Visit Note  Apr 14, 2021    Reason for Visit: follow up of multiple myeloma    Patient is currently taking Revlimid 10 mg 2 weeks on 1 week off  Dexamethasone 4 mg once a week  And Zometa every 3 months    Interval History:  Patient reports she 2 weeks ago she had cough shortness of breath.  She was tested for Covid which was negative her primary wants her to do chest x-ray,  She reports her neighbor prescribed Z-Venancio for her which helped with the cough but she continues to have some shortness of breath.  Although she does report that she has had chronic shortness of breath for years  Patient " denies chest pain denies fever chills sweats  Denies nausea vomiting diarrhea abdominal pain bleeding        Review of Systems:  14 point ROS of systems including Constitutional, Eyes, Respiratory, Cardiovascular, Gastroenterology, Genitourinary, Integumentary, Muscularskeletal, Psychiatric were all negative except for pertinent positives noted in my HPI.    Physical Examination:  /81   Pulse 87   Temp 98.9  F (37.2  C) (Oral)   Resp 16   Wt 64.8 kg (142 lb 12.8 oz)   SpO2 95%   BMI 26.12 kg/m      General: The patient is a pleasant female in no acute distress.  HEENT: EOMI, PERRL. Sclerae are anicteric. Oral mucosa is pink and moist with no lesions or thrush.   Lymph: Neck is supple with no lymphadenopathy in the cervical or supraclavicular areas.   Heart: Regular rate and rhythm.   Lungs: Clear to auscultation bilaterally.   GI: Bowel sounds present, soft, nontender with no palpable hepatosplenomegaly or masses.   Extremities: No lower extremity edema noted bilaterally.   Skin: No rashes, petechiae, or bruising noted on exposed skin.    Laboratory Data:  Results for DUGLAS CASTILLO (MRN [id number]) as of 4/14/2021 15:30   Ref. Range 4/12/2021 13:09   Sodium Latest Ref Range: 133 - 144 mmol/L 142   Potassium Latest Ref Range: 3.4 - 5.3 mmol/L 3.9   Chloride Latest Ref Range: 94 - 109 mmol/L 110 (H)   Carbon Dioxide Latest Ref Range: 20 - 32 mmol/L 28   Urea Nitrogen Latest Ref Range: 7 - 30 mg/dL 15   Creatinine Latest Ref Range: 0.52 - 1.04 mg/dL 0.61   GFR Estimate Latest Ref Range: >60 mL/min/1.73_m2 83   GFR Estimate If Black Latest Ref Range: >60 mL/min/1.73_m2 >90   Calcium Latest Ref Range: 8.5 - 10.1 mg/dL 9.3   Anion Gap Latest Ref Range: 3 - 14 mmol/L 4   Albumin Latest Ref Range: 3.4 - 5.0 g/dL 3.2 (L)   Protein Total Latest Ref Range: 6.8 - 8.8 g/dL 7.2   Bilirubin Total Latest Ref Range: 0.2 - 1.3 mg/dL 0.6   Alkaline Phosphatase Latest Ref Range: 40 - 150 U/L 112   ALT Latest Ref Range:  0 - 50 U/L 44   AST Latest Ref Range: 0 - 45 U/L 23   Glucose Latest Ref Range: 70 - 99 mg/dL 120 (H)   WBC Latest Ref Range: 4.0 - 11.0 10e9/L 4.9   Hemoglobin Latest Ref Range: 11.7 - 15.7 g/dL 11.5 (L)   Hematocrit Latest Ref Range: 35.0 - 47.0 % 34.7 (L)   Platelet Count Latest Ref Range: 150 - 450 10e9/L 249   RBC Count Latest Ref Range: 3.8 - 5.2 10e12/L 3.70 (L)   MCV Latest Ref Range: 78 - 100 fl 94   MCH Latest Ref Range: 26.5 - 33.0 pg 31.1   MCHC Latest Ref Range: 31.5 - 36.5 g/dL 33.1   RDW Latest Ref Range: 10.0 - 15.0 % 12.9   Diff Method Unknown Automated Method   % Neutrophils Latest Units: % 77.9   % Lymphocytes Latest Units: % 16.4   % Monocytes Latest Units: % 4.1   % Eosinophils Latest Units: % 0.6   % Basophils Latest Units: % 0.4   % Immature Granulocytes Latest Units: % 0.6   Nucleated RBCs Latest Ref Range: 0 /100 0   Absolute Neutrophil Latest Ref Range: 1.6 - 8.3 10e9/L 3.8   Absolute Lymphocytes Latest Ref Range: 0.8 - 5.3 10e9/L 0.8   Absolute Monocytes Latest Ref Range: 0.0 - 1.3 10e9/L 0.2   Absolute Eosinophils Latest Ref Range: 0.0 - 0.7 10e9/L 0.0   Absolute Basophils Latest Ref Range: 0.0 - 0.2 10e9/L 0.0   Abs Immature Granulocytes Latest Ref Range: 0 - 0.4 10e9/L 0.0   Absolute Nucleated RBC Unknown 0.0         Assessment and Plan:      This is a 85-year-old female with    multiple myeloma  Currently getting treatment with Revlimid 10 mg p.o. 14 days on 7 days off dexamethasone 4 mg a week  Light chains are slowly rising up  Per Dr. Parry continue with the same treatment for now  Continue with monthly labs  Patient has follow-up appointment with Dr. Parry in 2 months        Bone health  Patient has been tolerating Zometa well continue with Zometa every 3 months      Shortness of breath /cough  Reports improvement in symptoms since the start of Z-Venancio  Covid negative  Chest x-ray per PCP   Patient is getting Revlimid for multiple myeloma treatment which can cause blood clots  Order  CT of the chest to rule out PE  Patient is not in any acute distress  I advised patient to go to ER immediately in the event of worsening of shortness of breath chest pain cough or any changes in health condition        MARIUSZ Mckeon CNP  [facility]     Chart documentation with Dragon Voice recognition Software. Although reviewed after completion, some words and grammatical errors may remain.            Again, thank you for allowing me to participate in the care of your patient.        Sincerely,        MARIUSZ Mckeon CNP     No

## 2022-10-17 DIAGNOSIS — C90.00 MULTIPLE MYELOMA NOT HAVING ACHIEVED REMISSION (H): Primary | ICD-10-CM

## 2022-10-17 RX ORDER — LENALIDOMIDE 10 MG/1
10 CAPSULE ORAL DAILY
Qty: 14 CAPSULE | Refills: 0 | Status: SHIPPED | OUTPATIENT
Start: 2022-10-17 | End: 2022-12-26

## 2022-10-17 RX ORDER — DEXAMETHASONE 4 MG/1
4 TABLET ORAL WEEKLY
Qty: 3 TABLET | Refills: 0 | Status: SHIPPED | OUTPATIENT
Start: 2022-10-17 | End: 2022-10-26

## 2022-10-25 ENCOUNTER — LAB (OUTPATIENT)
Dept: INFUSION THERAPY | Facility: CLINIC | Age: 87
End: 2022-10-25
Attending: INTERNAL MEDICINE
Payer: MEDICARE

## 2022-10-25 DIAGNOSIS — C90.00 MULTIPLE MYELOMA NOT HAVING ACHIEVED REMISSION (H): ICD-10-CM

## 2022-10-25 LAB
ALBUMIN SERPL-MCNC: 3.3 G/DL (ref 3.4–5)
ALP SERPL-CCNC: 72 U/L (ref 40–150)
ALT SERPL W P-5'-P-CCNC: 14 U/L (ref 0–50)
ANION GAP SERPL CALCULATED.3IONS-SCNC: 7 MMOL/L (ref 3–14)
AST SERPL W P-5'-P-CCNC: 13 U/L (ref 0–45)
BASOPHILS # BLD AUTO: 0 10E3/UL (ref 0–0.2)
BASOPHILS NFR BLD AUTO: 1 %
BILIRUB SERPL-MCNC: 0.7 MG/DL (ref 0.2–1.3)
BUN SERPL-MCNC: 23 MG/DL (ref 7–30)
CALCIUM SERPL-MCNC: 8.8 MG/DL (ref 8.5–10.1)
CHLORIDE BLD-SCNC: 111 MMOL/L (ref 94–109)
CO2 SERPL-SCNC: 26 MMOL/L (ref 20–32)
CREAT SERPL-MCNC: 0.75 MG/DL (ref 0.52–1.04)
EOSINOPHIL # BLD AUTO: 0.2 10E3/UL (ref 0–0.7)
EOSINOPHIL NFR BLD AUTO: 6 %
ERYTHROCYTE [DISTWIDTH] IN BLOOD BY AUTOMATED COUNT: 17.1 % (ref 10–15)
GFR SERPL CREATININE-BSD FRML MDRD: 77 ML/MIN/1.73M2
GLUCOSE BLD-MCNC: 86 MG/DL (ref 70–99)
HCT VFR BLD AUTO: 29.1 % (ref 35–47)
HGB BLD-MCNC: 9.5 G/DL (ref 11.7–15.7)
IMM GRANULOCYTES # BLD: 0 10E3/UL
IMM GRANULOCYTES NFR BLD: 1 %
LYMPHOCYTES # BLD AUTO: 1.8 10E3/UL (ref 0.8–5.3)
LYMPHOCYTES NFR BLD AUTO: 46 %
MCH RBC QN AUTO: 31.1 PG (ref 26.5–33)
MCHC RBC AUTO-ENTMCNC: 32.6 G/DL (ref 31.5–36.5)
MCV RBC AUTO: 95 FL (ref 78–100)
MONOCYTES # BLD AUTO: 0.7 10E3/UL (ref 0–1.3)
MONOCYTES NFR BLD AUTO: 19 %
NEUTROPHILS # BLD AUTO: 1 10E3/UL (ref 1.6–8.3)
NEUTROPHILS NFR BLD AUTO: 27 %
NRBC # BLD AUTO: 0 10E3/UL
NRBC BLD AUTO-RTO: 0 /100
PLATELET # BLD AUTO: 142 10E3/UL (ref 150–450)
POTASSIUM BLD-SCNC: 3.6 MMOL/L (ref 3.4–5.3)
PROT SERPL-MCNC: 6.9 G/DL (ref 6.8–8.8)
RBC # BLD AUTO: 3.05 10E6/UL (ref 3.8–5.2)
SODIUM SERPL-SCNC: 144 MMOL/L (ref 133–144)
WBC # BLD AUTO: 3.7 10E3/UL (ref 4–11)

## 2022-10-25 PROCEDURE — 36415 COLL VENOUS BLD VENIPUNCTURE: CPT

## 2022-10-25 PROCEDURE — 85025 COMPLETE CBC W/AUTO DIFF WBC: CPT | Performed by: INTERNAL MEDICINE

## 2022-10-25 PROCEDURE — 80053 COMPREHEN METABOLIC PANEL: CPT | Performed by: INTERNAL MEDICINE

## 2022-10-25 NOTE — PROGRESS NOTES
Medical Assistant Note:  Quiana Dunaway presents today for blood draw.    Patient seen by provider today: No.   present during visit today: Not Applicable.    Concerns: No Concerns.    Procedure:  Lab draw site: lac, Needle type: bf, Gauge: 23.    Post Assessment:  Labs drawn without difficulty: Yes.    Discharge Plan:  Departure Mode: Ambulatory.    Face to Face Time: 5min.    Mariela Lyn, CMA

## 2022-10-26 ENCOUNTER — ONCOLOGY VISIT (OUTPATIENT)
Dept: ONCOLOGY | Facility: CLINIC | Age: 87
End: 2022-10-26
Attending: INTERNAL MEDICINE
Payer: MEDICARE

## 2022-10-26 VITALS
SYSTOLIC BLOOD PRESSURE: 121 MMHG | TEMPERATURE: 98.1 F | HEART RATE: 81 BPM | OXYGEN SATURATION: 98 % | RESPIRATION RATE: 18 BRPM | DIASTOLIC BLOOD PRESSURE: 83 MMHG | BODY MASS INDEX: 24.55 KG/M2 | WEIGHT: 134.2 LBS

## 2022-10-26 DIAGNOSIS — M62.81 GENERALIZED MUSCLE WEAKNESS: ICD-10-CM

## 2022-10-26 DIAGNOSIS — C90.00 MULTIPLE MYELOMA NOT HAVING ACHIEVED REMISSION (H): Primary | ICD-10-CM

## 2022-10-26 LAB — TOTAL PROTEIN SERUM FOR ELP: 5.9 G/DL (ref 6.4–8.3)

## 2022-10-26 PROCEDURE — 99215 OFFICE O/P EST HI 40 MIN: CPT | Performed by: INTERNAL MEDICINE

## 2022-10-26 PROCEDURE — 36415 COLL VENOUS BLD VENIPUNCTURE: CPT | Performed by: INTERNAL MEDICINE

## 2022-10-26 PROCEDURE — G0463 HOSPITAL OUTPT CLINIC VISIT: HCPCS

## 2022-10-26 PROCEDURE — 83521 IG LIGHT CHAINS FREE EACH: CPT | Performed by: INTERNAL MEDICINE

## 2022-10-26 PROCEDURE — 82784 ASSAY IGA/IGD/IGG/IGM EACH: CPT | Performed by: INTERNAL MEDICINE

## 2022-10-26 PROCEDURE — 84155 ASSAY OF PROTEIN SERUM: CPT | Performed by: INTERNAL MEDICINE

## 2022-10-26 PROCEDURE — 84165 PROTEIN E-PHORESIS SERUM: CPT | Mod: TC | Performed by: PATHOLOGY

## 2022-10-26 RX ORDER — DEXAMETHASONE 4 MG/1
4 TABLET ORAL WEEKLY
Qty: 14 TABLET | Refills: 3 | Status: ON HOLD | OUTPATIENT
Start: 2022-10-26 | End: 2022-12-13

## 2022-10-26 ASSESSMENT — PAIN SCALES - GENERAL: PAINLEVEL: MODERATE PAIN (5)

## 2022-10-26 NOTE — PATIENT INSTRUCTIONS
1. Continue revlimid and dexamethasone.  2. Resume Zometa 4 weeks after dental work is completed.  3. Continue aspirin and acyclovir.  4. Continue oral potassium.  5. Take imodium as needed.  6. Physical therapy referral.  7. See me in 1 months.  8. Labs as per treatment protocol.

## 2022-10-26 NOTE — LETTER
"    10/26/2022         RE: Quiana Dunaway  [address] [facility] [address]        Dear Colleague,    Thank you for referring your patient, Quiana Dunaway, to the [facility]. Please see a copy of my visit note below.    Oncology Rooming Note    October 26, 2022 8:39 AM   Quiana Dunaway is a 87 year old female who presents for:    Chief Complaint   Patient presents with     Oncology Clinic Visit     Initial Vitals: There were no vitals taken for this visit. Estimated body mass index is 24.51 kg/m  as calculated from the following:    Height as of 10/6/22: 1.575 m (5' 2\").    Weight as of 10/6/22: 60.8 kg (134 lb). There is no height or weight on file to calculate BSA.  Data Unavailable Comment: Data Unavailable   No LMP recorded. Patient is postmenopausal.  Allergies reviewed: Yes  Medications reviewed: Yes    Medications: Medication refills not needed today.  Pharmacy name entered into [facility]:    [facility] - [place], [place] [facility] [address] [id number] - [place], [place] - [address] [facility]    Clinical concerns: list given to ESTHER BK was notified.      Shari J. Schoenberger, [facility]              HEMATOLOGY HISTORY: Mrs. Dunaway is a lady with multiple myeloma (IgG kappa). High risk, t(14;16).  1. On 05/01/2017, WBC of 3.4, hemoglobin of 10.2 and platelet of 154.  2. SPEP on 07/07/2017 revealed M-spike of 1.8.  3. Bone marrow biopsy on 07/12/2017 reveals kappa monotypic plasma cell population consistent with multiple myeloma.  Bone marrow is 70% cellular.  Plasma cell is 50-60%.     -There is gain of chromosome 1, 5, 9, 15, and 17.  There is translocation 14;16.   4.  On 07/18/2017:  -M-spike of 1.9.   -Immunofixation reveals monoclonal IgG kappa.    -IgG level of 2970.  IgA of 15 and IgM of 175.    -Kappa free light chain of 67.7.  Lambda free light chain of 1.42.  Ratio of kappa to lambda of 47.71. "    -Beta 2 microglobulin of 3.4.   5. PET scan on 07/24/2017 reveals several focal areas of increased FDG uptake consistent with multiple myeloma.  There is probable epithelial cyst in tail of the pancreas.  No associated abnormal FDG activity.  Left thyroid cysts or nodules without any FDG activity.  There is a 0.3 cm left upper lobe lung nodule.   6.  Velcade, Revlimid and dexamethasone between on 08/14/2017 and 04/30/2018. Velcade stopped.   -Revlimid and dexamethasone continued.  -Revlimid held between 12/27/2021 and 03/08/2022.  7. CT chest, abdomen, and pelvis on 12/30/2021 does not reveal any malignancy.     SUBJECTIVE:  Ms. Dunaway is an 87-year-old female with multiple myeloma on revlimid and weekly dexamethasone.  Zometa is on hold for dental work.     For the last 2 weeks, she has pain in the neck.  It is mainly in the right side of the neck.  Denies any fall or trauma.  No radiation of the pain to the upper extremities.  No numbness or tingling or weakness in the upper extremities    Patient has fatigue.  No headache.  No dizziness.  She continues to have dental problem.  She is going to see dentist again and may need more extraction.  No chest pain.  No shortness of breath at rest.  Gets short of breath on exertion.  Patient has to climb few steps in her house.  She gets short of breath doing that.  No abdominal pain.  No nausea or vomiting.  No urinary complaint.  She has diarrhea.  Imodium helps.  She has 3-4 loose stools a day.  No blood in the urine or stool.  Patient has generalized muscle weakness.  She is using a cane.  She has not been falling down.  No recurrent infection.  All other review of system is negative.     PHYSICAL EXAMINATION:   GENERAL:  Alert and oriented x 3.   VITAL SIGNS:  Reviewed.       EYES:  No icterus.   NECK:  Supple.  No area of tenderness.  No lymphadenopathy. No thyromegaly.   AXILLAE:  No lymphadenopathy.   LUNGS:  Good air entry bilaterally.  No crackles or  wheezing.   HEART:  Regular.  No murmur.   GI: Abdomen is soft.  Nontender. No mass. Difficult palpation because of her weight.  EXTREMITIES:  No pedal edema.  No calf swelling or tenderness.   SKIN:  No rash.     LABORATORY: CBC and CMP. SPEP, IgG level and free light chains are pending.       ASSESSMENT:    1.  An 87-year-old female with multiple myeloma on Revlimid and weekly dexamethasone.  2.  Pancytopenia, stable.  3.  Fatigue secondary to her age, myeloma and multiple other medical problem.  4.  Right-sided neck pain.  5.  Dental problem.  6.  Diarrhea.     PLAN:    1.  Discussed with her regarding myeloma.  Myeloma labs from today are pending.    She will continue on Revlimid and weekly dexamethasone.  Overall she has been tolerating it well.    If labs from today reveal progression of myeloma, then will consider adding daratumumab.  Further discussion after labs are back.    2.  Patient has pain in the neck, mainly in the right side.  No radiation to the upper extremities.  Explained to her that pain could be from arthritis or muscular strain.  Also discussed with her regarding the possibility of myeloma causing the pain.  Patient lately has been having dental problem.  That could also be causing some referred pain.    Discussed regarding imaging studies.  I would recommend getting an MRI of the C-spine.  After discussion, patient wants to wait for some time and see what happens to the neck pain.  If pain does not resolve, then she will be agreeable for MRI.  She will call and let us know how her pain does in the next 2 to 3 weeks.    For pain, she has been taking Motrin.  I told her to alternate Motrin and Tylenol to avoid GI toxicity from Motrin.  She does not want any stronger pain medication.    3.  Patient has diarrhea.  Advised her to take Imodium every day.  Hopefully that will help.  If it does not, we will send her to GI.     4.  Once dental work-up is completed, we will resume zometa.     5.  She  will continue on aspirin and acyclovir.     6.  Her potassium is normal.  She will continue on oral potassium.     7.   Her pancytopenia is stable.  She does not need any transfusion or growth factor.  We will continue to monitor it.    8.  Patient has generalized muscle weakness.  We will get a physical therapy evaluation for muscle strengthening.     9.  Patient had few questions which were all answered.  I will see her in 1 months time.  Advised her to call us with any questions or concerns.     TOTAL VISIT TIME: 40 minutes.  Time spent in today's visit, review of chart/investigations today, monitoring for toxicity of high risk medication and documentation today.      Again, thank you for allowing me to participate in the care of your patient.        Sincerely,        Alex Parry MD

## 2022-10-26 NOTE — PROGRESS NOTES
"Oncology Rooming Note    October 26, 2022 8:39 AM   Quiana Dunaway is a 87 year old female who presents for:    Chief Complaint   Patient presents with     Oncology Clinic Visit     Initial Vitals: There were no vitals taken for this visit. Estimated body mass index is 24.51 kg/m  as calculated from the following:    Height as of 10/6/22: 1.575 m (5' 2\").    Weight as of 10/6/22: 60.8 kg (134 lb). There is no height or weight on file to calculate BSA.  Data Unavailable Comment: Data Unavailable   No LMP recorded. Patient is postmenopausal.  Allergies reviewed: Yes  Medications reviewed: Yes    Medications: Medication refills not needed today.  Pharmacy name entered into [facility]:    [facility] - [place], [place] - [address]  [id number] - [place], [place] - [address] AT [facility]    Clinical concerns: list given to ESTHER SANTANA was notified.      Shari J. Schoenberger, DYANA            "

## 2022-10-26 NOTE — PROGRESS NOTES
HEMATOLOGY HISTORY: Mrs. Dunaway is a lady with multiple myeloma (IgG kappa). High risk, t(14;16).  1. On 05/01/2017, WBC of 3.4, hemoglobin of 10.2 and platelet of 154.  2. SPEP on 07/07/2017 revealed M-spike of 1.8.  3. Bone marrow biopsy on 07/12/2017 reveals kappa monotypic plasma cell population consistent with multiple myeloma.  Bone marrow is 70% cellular.  Plasma cell is 50-60%.     -There is gain of chromosome 1, 5, 9, 15, and 17.  There is translocation 14;16.   4.  On 07/18/2017:  -M-spike of 1.9.   -Immunofixation reveals monoclonal IgG kappa.    -IgG level of 2970.  IgA of 15 and IgM of 175.    -Kappa free light chain of 67.7.  Lambda free light chain of 1.42.  Ratio of kappa to lambda of 47.71.    -Beta 2 microglobulin of 3.4.   5. PET scan on 07/24/2017 reveals several focal areas of increased FDG uptake consistent with multiple myeloma.  There is probable epithelial cyst in tail of the pancreas.  No associated abnormal FDG activity.  Left thyroid cysts or nodules without any FDG activity.  There is a 0.3 cm left upper lobe lung nodule.   6.  Velcade, Revlimid and dexamethasone between on 08/14/2017 and 04/30/2018. Velcade stopped.   -Revlimid and dexamethasone continued.  -Revlimid held between 12/27/2021 and 03/08/2022.  7. CT chest, abdomen, and pelvis on 12/30/2021 does not reveal any malignancy.     SUBJECTIVE:  Ms. Dunaway is an 87-year-old female with multiple myeloma on revlimid and weekly dexamethasone.  Zometa is on hold for dental work.     For the last 2 weeks, she has pain in the neck.  It is mainly in the right side of the neck.  Denies any fall or trauma.  No radiation of the pain to the upper extremities.  No numbness or tingling or weakness in the upper extremities    Patient has fatigue.  No headache.  No dizziness.  She continues to have dental problem.  She is going to see dentist again and may need more extraction.  No chest pain.  No shortness of breath at rest.  Gets short of  breath on exertion.  Patient has to climb few steps in her house.  She gets short of breath doing that.  No abdominal pain.  No nausea or vomiting.  No urinary complaint.  She has diarrhea.  Imodium helps.  She has 3-4 loose stools a day.  No blood in the urine or stool.  Patient has generalized muscle weakness.  She is using a cane.  She has not been falling down.  No recurrent infection.  All other review of system is negative.     PHYSICAL EXAMINATION:   GENERAL:  Alert and oriented x 3.   VITAL SIGNS:  Reviewed.       EYES:  No icterus.   NECK:  Supple.  No area of tenderness.  No lymphadenopathy. No thyromegaly.   AXILLAE:  No lymphadenopathy.   LUNGS:  Good air entry bilaterally.  No crackles or wheezing.   HEART:  Regular.  No murmur.   GI: Abdomen is soft.  Nontender. No mass. Difficult palpation because of her weight.  EXTREMITIES:  No pedal edema.  No calf swelling or tenderness.   SKIN:  No rash.     LABORATORY: CBC and CMP. SPEP, IgG level and free light chains are pending.       ASSESSMENT:    1.  An 87-year-old female with multiple myeloma on Revlimid and weekly dexamethasone.  2.  Pancytopenia, stable.  3.  Fatigue secondary to her age, myeloma and multiple other medical problem.  4.  Right-sided neck pain.  5.  Dental problem.  6.  Diarrhea.     PLAN:    1.  Discussed with her regarding myeloma.  Myeloma labs from today are pending.    She will continue on Revlimid and weekly dexamethasone.  Overall she has been tolerating it well.    If labs from today reveal progression of myeloma, then will consider adding daratumumab.  Further discussion after labs are back.    2.  Patient has pain in the neck, mainly in the right side.  No radiation to the upper extremities.  Explained to her that pain could be from arthritis or muscular strain.  Also discussed with her regarding the possibility of myeloma causing the pain.  Patient lately has been having dental problem.  That could also be causing some referred  pain.    Discussed regarding imaging studies.  I would recommend getting an MRI of the C-spine.  After discussion, patient wants to wait for some time and see what happens to the neck pain.  If pain does not resolve, then she will be agreeable for MRI.  She will call and let us know how her pain does in the next 2 to 3 weeks.    For pain, she has been taking Motrin.  I told her to alternate Motrin and Tylenol to avoid GI toxicity from Motrin.  She does not want any stronger pain medication.    3.  Patient has diarrhea.  Advised her to take Imodium every day.  Hopefully that will help.  If it does not, we will send her to GI.     4.  Once dental work-up is completed, we will resume zometa.     5.  She will continue on aspirin and acyclovir.     6.  Her potassium is normal.  She will continue on oral potassium.     7.   Her pancytopenia is stable.  She does not need any transfusion or growth factor.  We will continue to monitor it.    8.  Patient has generalized muscle weakness.  We will get a physical therapy evaluation for muscle strengthening.     9.  Patient had few questions which were all answered.  I will see her in 1 months time.  Advised her to call us with any questions or concerns.     TOTAL VISIT TIME: 40 minutes.  Time spent in today's visit, review of chart/investigations today, monitoring for toxicity of high risk medication and documentation today.

## 2022-10-27 LAB
ALBUMIN SERPL ELPH-MCNC: 3.5 G/DL (ref 3.7–5.1)
ALPHA1 GLOB SERPL ELPH-MCNC: 0.3 G/DL (ref 0.2–0.4)
ALPHA2 GLOB SERPL ELPH-MCNC: 0.6 G/DL (ref 0.5–0.9)
B-GLOBULIN SERPL ELPH-MCNC: 0.6 G/DL (ref 0.6–1)
GAMMA GLOB SERPL ELPH-MCNC: 0.9 G/DL (ref 0.7–1.6)
IGG SERPL-MCNC: 1028 MG/DL (ref 610–1616)
KAPPA LC FREE SER-MCNC: 67.3 MG/DL (ref 0.33–1.94)
KAPPA LC FREE/LAMBDA FREE SER NEPH: 79.18 {RATIO} (ref 0.26–1.65)
LAMBDA LC FREE SERPL-MCNC: 0.85 MG/DL (ref 0.57–2.63)
M PROTEIN SERPL ELPH-MCNC: 0.7 G/DL
PROT PATTERN SERPL ELPH-IMP: ABNORMAL

## 2022-10-27 PROCEDURE — 84165 PROTEIN E-PHORESIS SERUM: CPT | Mod: 26

## 2022-10-28 NOTE — RESULT ENCOUNTER NOTE
Dear Ms. Dunaway,    Blood test reveals worsening of myeloma. As we discussed in the clinic, we will daratumumab. Our office will call to schedule appointment.    Please, call me with any questions.    Alex Parry MD

## 2022-10-30 ENCOUNTER — NURSE TRIAGE (OUTPATIENT)
Dept: NURSING | Facility: CLINIC | Age: 87
End: 2022-10-30

## 2022-10-30 ENCOUNTER — APPOINTMENT (OUTPATIENT)
Dept: GENERAL RADIOLOGY | Facility: CLINIC | Age: 87
End: 2022-10-30
Attending: EMERGENCY MEDICINE
Payer: MEDICARE

## 2022-10-30 ENCOUNTER — HOSPITAL ENCOUNTER (EMERGENCY)
Facility: CLINIC | Age: 87
Discharge: HOME | End: 2022-10-30
Attending: EMERGENCY MEDICINE | Admitting: EMERGENCY MEDICINE
Payer: MEDICARE

## 2022-10-30 VITALS
HEART RATE: 75 BPM | SYSTOLIC BLOOD PRESSURE: 115 MMHG | RESPIRATION RATE: 18 BRPM | DIASTOLIC BLOOD PRESSURE: 60 MMHG | BODY MASS INDEX: 23.05 KG/M2 | OXYGEN SATURATION: 94 % | TEMPERATURE: 100 F | HEIGHT: 64 IN | WEIGHT: 135 LBS

## 2022-10-30 DIAGNOSIS — C90.00 MULTIPLE MYELOMA NOT HAVING ACHIEVED REMISSION (H): ICD-10-CM

## 2022-10-30 DIAGNOSIS — U07.1 INFECTION DUE TO 2019 NOVEL CORONAVIRUS: ICD-10-CM

## 2022-10-30 LAB
ALBUMIN SERPL-MCNC: 3 G/DL (ref 3.4–5)
ALBUMIN UR-MCNC: 20 MG/DL
ALP SERPL-CCNC: 76 U/L (ref 40–150)
ALT SERPL W P-5'-P-CCNC: 13 U/L (ref 0–50)
ANION GAP SERPL CALCULATED.3IONS-SCNC: 3 MMOL/L (ref 3–14)
APPEARANCE UR: CLEAR
AST SERPL W P-5'-P-CCNC: 10 U/L (ref 0–45)
ATRIAL RATE - MUSE: 73 BPM
BACTERIA #/AREA URNS HPF: ABNORMAL /HPF
BASOPHILS # BLD AUTO: 0 10E3/UL (ref 0–0.2)
BASOPHILS NFR BLD AUTO: 0 %
BILIRUB DIRECT SERPL-MCNC: 0.3 MG/DL (ref 0–0.2)
BILIRUB SERPL-MCNC: 0.9 MG/DL (ref 0.2–1.3)
BILIRUB UR QL STRIP: NEGATIVE
BUN SERPL-MCNC: 15 MG/DL (ref 7–30)
CALCIUM SERPL-MCNC: 8.4 MG/DL (ref 8.5–10.1)
CHLORIDE BLD-SCNC: 105 MMOL/L (ref 94–109)
CO2 SERPL-SCNC: 27 MMOL/L (ref 20–32)
COLOR UR AUTO: ABNORMAL
CREAT SERPL-MCNC: 0.73 MG/DL (ref 0.52–1.04)
DIASTOLIC BLOOD PRESSURE - MUSE: NORMAL MMHG
EOSINOPHIL # BLD AUTO: 0.1 10E3/UL (ref 0–0.7)
EOSINOPHIL NFR BLD AUTO: 1 %
ERYTHROCYTE [DISTWIDTH] IN BLOOD BY AUTOMATED COUNT: 16.9 % (ref 10–15)
FLUAV RNA SPEC QL NAA+PROBE: NEGATIVE
FLUBV RNA RESP QL NAA+PROBE: NEGATIVE
GFR SERPL CREATININE-BSD FRML MDRD: 80 ML/MIN/1.73M2
GLUCOSE BLD-MCNC: 116 MG/DL (ref 70–99)
GLUCOSE UR STRIP-MCNC: NEGATIVE MG/DL
HCT VFR BLD AUTO: 27.3 % (ref 35–47)
HGB BLD-MCNC: 8.8 G/DL (ref 11.7–15.7)
HGB UR QL STRIP: ABNORMAL
HOLD SPECIMEN: NORMAL
HYALINE CASTS: 2 /LPF
IMM GRANULOCYTES # BLD: 0 10E3/UL
IMM GRANULOCYTES NFR BLD: 1 %
INTERPRETATION ECG - MUSE: NORMAL
KETONES UR STRIP-MCNC: NEGATIVE MG/DL
LACTATE SERPL-SCNC: 1.3 MMOL/L (ref 0.7–2)
LEUKOCYTE ESTERASE UR QL STRIP: ABNORMAL
LYMPHOCYTES # BLD AUTO: 1.2 10E3/UL (ref 0.8–5.3)
LYMPHOCYTES NFR BLD AUTO: 22 %
MCH RBC QN AUTO: 30.9 PG (ref 26.5–33)
MCHC RBC AUTO-ENTMCNC: 32.2 G/DL (ref 31.5–36.5)
MCV RBC AUTO: 96 FL (ref 78–100)
MONOCYTES # BLD AUTO: 0.3 10E3/UL (ref 0–1.3)
MONOCYTES NFR BLD AUTO: 5 %
MUCOUS THREADS #/AREA URNS LPF: PRESENT /LPF
NEUTROPHILS # BLD AUTO: 3.8 10E3/UL (ref 1.6–8.3)
NEUTROPHILS NFR BLD AUTO: 71 %
NITRATE UR QL: NEGATIVE
NRBC # BLD AUTO: 0 10E3/UL
NRBC BLD AUTO-RTO: 0 /100
P AXIS - MUSE: 67 DEGREES
PH UR STRIP: 5.5 [PH] (ref 5–7)
PLATELET # BLD AUTO: 142 10E3/UL (ref 150–450)
POTASSIUM BLD-SCNC: 3.6 MMOL/L (ref 3.4–5.3)
PR INTERVAL - MUSE: 336 MS
PROT SERPL-MCNC: 6.6 G/DL (ref 6.8–8.8)
QRS DURATION - MUSE: 148 MS
QT - MUSE: 468 MS
QTC - MUSE: 515 MS
R AXIS - MUSE: -44 DEGREES
RBC # BLD AUTO: 2.85 10E6/UL (ref 3.8–5.2)
RBC URINE: 2 /HPF
RSV RNA SPEC NAA+PROBE: NEGATIVE
SARS-COV-2 RNA RESP QL NAA+PROBE: POSITIVE
SODIUM SERPL-SCNC: 135 MMOL/L (ref 133–144)
SP GR UR STRIP: 1.01 (ref 1–1.03)
SQUAMOUS EPITHELIAL: 2 /HPF
SYSTOLIC BLOOD PRESSURE - MUSE: NORMAL MMHG
T AXIS - MUSE: 78 DEGREES
UROBILINOGEN UR STRIP-MCNC: NORMAL MG/DL
VENTRICULAR RATE- MUSE: 73 BPM
WBC # BLD AUTO: 5.4 10E3/UL (ref 4–11)
WBC URINE: 4 /HPF

## 2022-10-30 PROCEDURE — 85025 COMPLETE CBC W/AUTO DIFF WBC: CPT | Performed by: EMERGENCY MEDICINE

## 2022-10-30 PROCEDURE — 87086 URINE CULTURE/COLONY COUNT: CPT | Performed by: EMERGENCY MEDICINE

## 2022-10-30 PROCEDURE — 71046 X-RAY EXAM CHEST 2 VIEWS: CPT

## 2022-10-30 PROCEDURE — 87040 BLOOD CULTURE FOR BACTERIA: CPT | Performed by: EMERGENCY MEDICINE

## 2022-10-30 PROCEDURE — 36415 COLL VENOUS BLD VENIPUNCTURE: CPT | Performed by: EMERGENCY MEDICINE

## 2022-10-30 PROCEDURE — 80048 BASIC METABOLIC PNL TOTAL CA: CPT | Performed by: EMERGENCY MEDICINE

## 2022-10-30 PROCEDURE — 82248 BILIRUBIN DIRECT: CPT | Performed by: EMERGENCY MEDICINE

## 2022-10-30 PROCEDURE — C9803 HOPD COVID-19 SPEC COLLECT: HCPCS

## 2022-10-30 PROCEDURE — 83605 ASSAY OF LACTIC ACID: CPT | Performed by: EMERGENCY MEDICINE

## 2022-10-30 PROCEDURE — 82310 ASSAY OF CALCIUM: CPT | Performed by: EMERGENCY MEDICINE

## 2022-10-30 PROCEDURE — 93005 ELECTROCARDIOGRAM TRACING: CPT

## 2022-10-30 PROCEDURE — 81001 URINALYSIS AUTO W/SCOPE: CPT | Performed by: EMERGENCY MEDICINE

## 2022-10-30 PROCEDURE — 99285 EMERGENCY DEPT VISIT HI MDM: CPT | Mod: CS,25

## 2022-10-30 PROCEDURE — 87637 SARSCOV2&INF A&B&RSV AMP PRB: CPT | Performed by: EMERGENCY MEDICINE

## 2022-10-30 ASSESSMENT — ENCOUNTER SYMPTOMS
DYSURIA: 0
VOMITING: 0
DIARRHEA: 0
HEADACHES: 0
MYALGIAS: 0
CHILLS: 0
COUGH: 1
NAUSEA: 0
SORE THROAT: 0
FEVER: 1

## 2022-10-30 ASSESSMENT — ACTIVITIES OF DAILY LIVING (ADL)
ADLS_ACUITY_SCORE: 35
ADLS_ACUITY_SCORE: 35

## 2022-10-30 NOTE — ED PROVIDER NOTES
History   Chief Complaint:  Fever    The history is provided by the patient and a relative.      Quiana Dunaway is an 87 year old female with history of multiple myeloma, stage 3 chronic kidney disease, congestive heart failure, and secondary malignant neoplasm of bone who presents with a fever. She reports onset of fever reaching 101 F this morning. She adds that she has had a cough for a couple of days. She notes that she took Tylenol at home. Of note, she has been taking Lenalidomide and dexamethasone for 6 years for her multiple myeloma.  She denies chills, nausea, vomiting, diarrhea, dysuria, rash, headache, myalgia, and sore throat.     Review of Systems   Constitutional: Positive for fever. Negative for chills.   HENT: Negative for sore throat.    Respiratory: Positive for cough.    Gastrointestinal: Negative for diarrhea, nausea and vomiting.   Genitourinary: Negative for dysuria.   Musculoskeletal: Negative for myalgias.   Skin: Negative for rash.   Neurological: Negative for headaches.   All other systems reviewed and are negative.    Allergies:  Levaquin [Levofloxacin]    Medications:  Acyclovir  Dexamethasone  Famotidine  Furosemide  Lenalidomide  Lisinopril  Lorazepam  Methimazole  Mirabegron  Mirtazapine  Nitroglycerine  Ondansetron  Potassium chloride  Prochlorperazine   Aspirin 81 MG  Loperamide    Past Medical History:     Hypertension  Multiple myeloma  ROBER  Stage 3 CKD  CHF  Sick sinus syndrome  Overactive bladder  Neutropenia  Osteopenia  Vitamin B 12 deficiency, non anemic  Apathetic thyrotoxicosis  Toxic multinodal goiter  Secondary malignant neoplasm of bone    Past Surgical History:    Cataract removal  Bone marrow biopsy, bone specimen, needle/trocar  Implant pacemaker     Family History:    Mother- breast cancer  Brother- colon cancer  Sister- colon cancer    Social History:  Presents with daughter in law  Presents via private vehicle     Physical Exam     Patient Vitals for the past 24  "hrs:   BP Temp Temp src Pulse Resp SpO2 Height Weight   10/30/22 2025 115/60 -- -- 75 18 94 % -- --   10/30/22 2022 -- -- -- -- -- 93 % -- --   10/30/22 2021 115/60 -- -- 76 -- -- -- --   10/30/22 1900 -- -- -- -- -- 92 % -- --   10/30/22 1800 -- -- -- 75 21 93 % -- --   10/30/22 1750 -- -- -- 71 23 93 % -- --   10/30/22 1740 -- -- -- 73 26 91 % -- --   10/30/22 1730 -- -- -- 71 27 92 % -- --   10/30/22 1700 112/53 -- -- 74 18 94 % -- --   10/30/22 1650 -- 100  F (37.8  C) -- 75 22 95 % 1.626 m (5' 4\") 61.2 kg (135 lb)   10/30/22 1646 127/56 98.9  F (37.2  C) Temporal 79 16 95 % 1.6 m (5' 3\") 61.2 kg (135 lb)     Physical Exam  Nursing note and vitals reviewed.  Constitutional:  Oriented to person, place, and time. Cooperative.   HENT:   Nose:    Nose normal.   Mouth/Throat:   Face mask in place.  Eyes:    Conjunctivae normal and EOM are normal.      Pupils are equal, round, and reactive to light.   Neck:    Trachea normal.   Cardiovascular:  Normal rate, regular rhythm, normal heart sounds and normal pulses. No murmur heard.  Pulmonary/Chest:  Effort normal and breath sounds normal.   Abdominal:   Soft. Normal appearance and bowel sounds are normal.      There is no tenderness.      There is no rebound and no CVA tenderness.   Musculoskeletal:  Extremities atraumatic x 4.   Lymphadenopathy:  No cervical adenopathy.   Neurological:   Alert and oriented to person, place, and time. Normal strength.      No cranial nerve deficit or sensory deficit. GCS eye subscore is 4. GCS verbal subscore is 5. GCS motor subscore is 6.   Skin:    Skin is intact. No rash noted.   Psychiatric:   Normal mood and affect.    Emergency Department Course   ECG  ECG taken at 1751, ECG read at 1815  Atrial-sensed ventricular-paced rhythm with prolonged AV conduction   No change as compared to prior, dated 6/27/22.  Rate 73 bpm. AZ interval 336 ms. QRS duration 148 ms. QT/QTc 468/515 ms. P-R-T axes 67 -44 78.     Imaging:  Chest XR,  PA & LAT "   Final Result   IMPRESSION: Heart size and pulmonary vascularity normal. Subsegmental atelectasis left base, lungs otherwise clear. Pacemaker lead tips right atrium and right ventricle. Atrophic changes thoracic spine. Gas distended bowel loops left upper quadrant.        Report per radiology    Laboratory:  Labs Ordered and Resulted from Time of ED Arrival to Time of ED Departure   BASIC METABOLIC PANEL - Abnormal       Result Value    Sodium 135      Potassium 3.6      Chloride 105      Carbon Dioxide (CO2) 27      Anion Gap 3      Urea Nitrogen 15      Creatinine 0.73      Calcium 8.4 (*)     Glucose 116 (*)     GFR Estimate 80     CBC WITH PLATELETS AND DIFFERENTIAL - Abnormal    WBC Count 5.4      RBC Count 2.85 (*)     Hemoglobin 8.8 (*)     Hematocrit 27.3 (*)     MCV 96      MCH 30.9      MCHC 32.2      RDW 16.9 (*)     Platelet Count 142 (*)     % Neutrophils 71      % Lymphocytes 22      % Monocytes 5      % Eosinophils 1      % Basophils 0      % Immature Granulocytes 1      NRBCs per 100 WBC 0      Absolute Neutrophils 3.8      Absolute Lymphocytes 1.2      Absolute Monocytes 0.3      Absolute Eosinophils 0.1      Absolute Basophils 0.0      Absolute Immature Granulocytes 0.0      Absolute NRBCs 0.0     ROUTINE UA WITH MICROSCOPIC REFLEX TO CULTURE - Abnormal    Color Urine Light Yellow      Appearance Urine Clear      Glucose Urine Negative      Bilirubin Urine Negative      Ketones Urine Negative      Specific Gravity Urine 1.012      Blood Urine Trace (*)     pH Urine 5.5      Protein Albumin Urine 20 (*)     Urobilinogen Urine Normal      Nitrite Urine Negative      Leukocyte Esterase Urine Trace (*)     Bacteria Urine Few (*)     Mucus Urine Present (*)     RBC Urine 2      WBC Urine 4      Squamous Epithelials Urine 2 (*)     Hyaline Casts Urine 2     HEPATIC FUNCTION PANEL - Abnormal    Bilirubin Total 0.9      Bilirubin Direct 0.3 (*)     Protein Total 6.6 (*)     Albumin 3.0 (*)     Alkaline  Phosphatase 76      AST 10      ALT 13     INFLUENZA A/B & SARS-COV2 PCR MULTIPLEX - Abnormal    Influenza A PCR Negative      Influenza B PCR Negative      RSV PCR Negative      SARS CoV2 PCR Positive (*)    LACTIC ACID WHOLE BLOOD - Normal    Lactic Acid 1.3     URINE CULTURE   BLOOD CULTURE   BLOOD CULTURE     Emergency Department Course:  Reviewed:  I reviewed nursing notes, vitals, past medical history and Care Everywhere    Assessments:  1816 I obtained history and examined the patient as noted above.   1936 I rechecked and updated the patient.     Consults:  1957 I spoke with Dr. nSider from oncology.     Disposition:  The patient was discharged to home.     Impression & Plan   Medical Decision Making:  This is an 87-year-old female came in for further evaluation of a fever and cough today.  Her cough actually started in the last couple of days.  She does not appear septic or toxic here, and she is not in respiratory distress.  She did take Tylenol earlier today, and therefore she does not have a fever here.  I proceeded with the above work-up here including the blood work, urine, chest x-ray, and COVID, influenza, and RSV swab.  Her work-up here shows that she does not fact have COVID, but the rest of her work-up is unremarkable.  I feel that she is safe for discharge and outpatient management.  I had a long discussion with her about being prescribed Paxlovid, and she preferred that I discussed this with the oncologist, which I did.  The oncologist was in agreement with this and our pharmacist look through her medication list, and she does not appear to have any contraindications.  Therefore I sent the prescription to the [facility] in [place].  She knows to return with any concerns or worsening symptoms as well.  She should follow-up with her physician as needed.    Medical Decision Making for Paxlovid Prescribing     Basic Criteria     Patient has a positive COVID test (antigen or molecular,  during this visit or at home): Yes    Patient is within five days of symptom onset (symptom onset day zero): Yes    Patient has symptomatic, mild to moderate COVID not requiring hospitalization or oxygen: Yes     Patient is at least 12 years of age and 40 kg     Higher risk for progression of disease     This patient is eligible for anti-viral treatment due to:     Age >65     Cancer     Cerebrovascular disease     Chronic kidney disease     Chronic lung diseases limited to:   o Interstitial lung disease  o Pulmonary embolism  o Pulmonary hypertension  o Bronchiectasis  o COPD (chronic obstructive pulmonary disease)    Chronic liver diseases limited to:  o Cirrhosis  o Non-alcoholic fatty liver disease  o Alcoholic liver disease  o Autoimmune hepatitis    Cystic fibrosis    Diabetes mellitus, type 1 and type 2    Disabilities  o Attention-Deficit/Hyperactivity Disorder (ADHD)  o Cerebral Palsy  o Congenital Malformations (Birth Defects)  o Limitations with self-care or activities of daily living  o Intellectual and Developmental Disabilities  o Learning Disabilities  o Spinal Cord Injuries     Heart conditions (such as heart failure, coronary artery disease, or cardiomyopathies)    HIV (human immunodeficiency virus)    Mental health disorders limited to:  o Mood disorders, including depression  o Schizophrenia spectrum disorders    Neurologic conditions limited to dementia    Obesity or overweight (BMI ?25 kg/m2)    Primary Immunodeficiencies    Pregnancy and recent pregnancy    Physical inactivity    Sickle Cell Disease    Smoking, current and former    Solid organ or hematopoietic cell transplantation    Substance use disorders    Thalassemia    Tuberculosis     Use of corticosteroids or other immunosuppressive medications    Pregnancy  This patient is of child-bearing potential: No  If yes:    Pregnancy test considered (Paxlovid is authorized for use in pregnancy and recommended by some authorities but positive  pregnancy test prompts more shared-decision making)    If patient is on oral contraceptive, additional barrier method advised given potential effect from Paxlovid on contraceptive efficacy    Dosing  GFR Estimate   Date Value Ref Range Status   10/30/2022 80 >60 mL/min/1.73m2 Final     Comment:     Effective December 21, 2021 eGFRcr in adults is calculated using the 2021 CKD-EPI creatinine equation which includes age and gender (Karina et al., [place], DOI: 10.1056/ONWJhl6580831)   06/02/2021 80 >60 mL/min/[1.73_m2] Final     Comment:     Non  GFR Calc  Starting 12/18/2018, serum creatinine based estimated GFR (eGFR) will be   calculated using the Chronic Kidney Disease Epidemiology Collaboration   (CKD-EPI) equation.       GFR Estimate If Black   Date Value Ref Range Status   06/02/2021 >90 >60 mL/min/[1.73_m2] Final     Comment:      GFR Calc  Starting 12/18/2018, serum creatinine based estimated GFR (eGFR) will be   calculated using the Chronic Kidney Disease Epidemiology Collaboration   (CKD-EPI) equation.       (serum creatinine within six months)    GFR >60, no adjustment  o 300 mg nirmatrelvir (two 150 mg tablets) with 100 mg ritonavir one tablet, all three tablets taken together twice daily for 5 days    GFR 30-60, adjustment indicated  o 150 mg nirmatrelvir one tablet with 100 mg ritonavir one tablet, both tablets taken together twice daily for 5 days    GFR <30, not candidate for treatment with Paxlovid    Contraindications    Severe liver disease    Severe renal impairment (GFR <30 ml/min)    Allergy to nirmatrelvir or ritonavir    Drug interaction that cannot be resolved        Drug-drug Interactions  Medications reviewed: Yes    [email or web address]    The following drugs are a contraindication: None  The following drugs will be held for eight days after consideration of risk/benefit: None  The following drugs will have to be held or have dose  adjustment after consideration of risk/benefit: None   Given complexity, patient was instructed to contact primary provider or specialist     Medication location  [email or web address]    Shared-decision making  Medication has FDA EUA for this patient (acute symptomatic mild-moderate COVID infection with risk for progression)   Studies show 88% reduction in hospitalization and death   Adverse effects include altered taste, diarrhea, hypertension, myalgia   Pregnancy and lactation are not considered contraindications to prescribing   Discussed multitude of drug-drug interactions and encouraged review with pharmacist       Diagnosis:    ICD-10-CM    1. Infection due to 2019 novel coronavirus  U07.1 [facility] Virtual Home Monitoring Referral: COVID      2. Multiple myeloma not having achieved remission (H)  C90.00 [facility] Home Monitoring Referral: COVID        Discharge Medications:  Discharge Medication List as of 10/30/2022  8:16 PM      START taking these medications    Details   nirmatrelvir and ritonavir (PAXLOVID) therapy pack Take 3 tablets by mouth 2 times daily for 5 days Take 2 Nirmatrelvir tablets and 1 Ritonavir tablet twice daily for 5 days., Disp-30 tablet, R-0, E-PrescribeIf Paxlovid unavailable and no Molnupiravir ordered, refer patient to MNRAP at [email or web address]. If Molnupiravir ordered along with Paxlovid, dispense Molnupiravir and review embryotoxicity.           Scribe Disclosure:  I, Yolanda Walsh, am serving as a scribe at 6:51 PM on 10/30/2022 to document services personally performed by Hiram Diamond MD based on my observations and the provider's statements to me.      Hiram Diamond MD  10/30/22 [id number]

## 2022-10-30 NOTE — ED TRIAGE NOTES
Patient w/ history of cancer comes in with a fever that spiked to 102 degs F around 2pm, patient took tylenol at home.      Triage Assessment     Row Name 10/30/22 7350       Triage Assessment (Adult)    Airway WDL WDL       Respiratory WDL    Respiratory WDL cough;X  Patient stated, no cough during triage    Cough Frequency infrequent       Skin Circulation/Temperature WDL    Skin Circulation/Temperature WDL WDL       Cardiac WDL    Cardiac WDL WDL       Peripheral/Neurovascular WDL    Peripheral Neurovascular WDL WDL       Cognitive/Neuro/Behavioral WDL    Cognitive/Neuro/Behavioral WDL WDL

## 2022-10-30 NOTE — TELEPHONE ENCOUNTER
"    Reason for Disposition    [1] Neutropenia known or suspected (e.g., recent cancer chemotherapy) AND [2] fever > 100.4 F (38.0 C)    Additional Information    Negative: Shock suspected (e.g., cold/pale/clammy skin, too weak to stand, low BP, rapid pulse)    Negative: Difficult to awaken or acting confused (e.g., disoriented, slurred speech)    Negative: Bluish (or gray) lips or face now    Negative: New-onset rash with many purple (or blood-colored) spots or dots    Negative: Sounds like a life-threatening emergency to the triager    Negative: Other symptom is present, see that guideline (e.g., symptoms of cough, runny nose, sore throat, earache, abdominal pain, diarrhea, vomiting)    Negative: Fever > 103 F (39.4 C)    Answer Assessment - Initial Assessment Questions  1. TEMPERATURE: \"What is the most recent temperature?\"  \"How was it measured?\"       101  2. ONSET: \"When did the fever start?\"       2:30pyes  3. CHILLS: \"Do you have chills?\" If yes: \"How bad are they?\"  (e.g., none, mild, moderate, severe)    - NONE: no chills    - MILD: feeling cold    - MODERATE: feeling very cold, some shivering (feels better under a thick blanket)    - SEVERE: feeling extremely cold with shaking chills (general body shaking, rigors; even under a thick blanket)       mild  4. OTHER SYMPTOMS: \"Do you have any other symptoms besides the fever?\"  (e.g., abdomen pain, cough, diarrhea, earache, headache, sore throat, urination pain)      Weakess, cough  5. CONTACTS: \"Does anyone else in the family have an infection?\"      No, rapid covid test negative  6. FEVER TREATMENT: \"What have you done so far to treat this fever?\" (e.g., medications)      Took some advil  7. CANCER: \"What type of cancer do you have?\"      Multiple myeloma 6 years  8. CANCER - TREATMENT: \"What cancer treatments have you received?\" \"When did you last receive them?\" (e.g., recent surgery, radiation, or chemotherapy). If triager has access to the patient's " "medical record, triager should review treatments and administration dates.      revlamid  9. CANCER - NEUTROPENIA RISK: \"Have you received chemotherapy recently? If Yes, ask: \"When was it and what was your last CBC and ANC (absolute neutrophil count)?\" \"Were you told that your white cell count was low?\" If triager has access to the patient's medical record, triager should review most recent labs. An ANC less than 1,000 - 1,500 means that the neutrophils are low and the immune system is weak.      Labs were off last week  10. DEVICES or DRAINS:  \"Do you have any type of central line, implanted port, PICC line, indwelling catheter or any other tubes or drains?\" \"Is there any redness, swelling or drainage from around the site?\"        no  11. MEDICINES: \"Are you taking any medicines that suppress your immune system other than your cancer treatment?\" (e.g., steroids, cyclosporine, CellCept)        dexamethasone    Protocols used: CANCER - FEVER-A-AH      "

## 2022-10-31 ENCOUNTER — PATIENT OUTREACH (OUTPATIENT)
Dept: CARE COORDINATION | Facility: CLINIC | Age: 87
End: 2022-10-31

## 2022-10-31 ENCOUNTER — TELEPHONE (OUTPATIENT)
Dept: ONCOLOGY | Facility: CLINIC | Age: 87
End: 2022-10-31

## 2022-10-31 NOTE — RESULT ENCOUNTER NOTE
[facility] Emergency Dept discharge antibiotic (if prescribed): None  No changes in treatment per [facility] ED Lab Result Urine culture protocol.

## 2022-10-31 NOTE — TELEPHONE ENCOUNTER
Writer received a return call from patient's daughter Jocelin and reviewed the recommendation to hold Revlimid,  And delay start of Daratummab for 2 weeks.    Writer will email information to [email or web address] ID with a plan to review the information on 11/3 at 3:30.    Alecia Briggs RN    
Dr. Parry reviewed concern and advises to hold starting Darzalex for 2 weeks and Revlimid to be held for 2 weeks.     Writer called to inform of new instructions and review treatment side effects. A message left on  requesting to return call.     Alecia Briggs RN        
Patient's daughter in law called to report that Quiana went to the ED on 10/30 and tested positive for COVID. She was prescribed Paxlovid.    She is also inquiring about a call she received from scheduling on Friday regarding Quiana starting a new treatment. She was not aware that Quiana was going to be starting something new.  
No pallor, no cervical/supraclavicular/inguinal adenopathy.  No splenomegaly

## 2022-10-31 NOTE — PROGRESS NOTES
Clinic Care Coordination Contact    Referral Type: Virtual Home Monitoring - GetWell Loop Program    Patient referred for Virtual Home Monitoring Program for either COVID-19 or Community Acquired Pneumonia diagnosis following recent [facility] ED visit.      Criteria for Virtual Home Monitoring telephone outreach is not met after review of ED encounter/ED provider note because:    1) ED provider note indicates assessment was negative for respiratory distress. O2 sats were stable throughout course of ED visit per chart review.      2) Patient was not discharged with new supplemental oxygen.    Per notes, ED provider and/or ED care team discussed appropriate follow up guidelines with patient prior to discharge or reflected these instructions on AVS.       Nicolasa Lange, FAUSTINO  [facility]  - RN Care Coordinator

## 2022-10-31 NOTE — DISCHARGE INSTRUCTIONS

## 2022-11-01 ENCOUNTER — DOCUMENTATION ONLY (OUTPATIENT)
Dept: ONCOLOGY | Facility: CLINIC | Age: 87
End: 2022-11-01

## 2022-11-01 LAB — BACTERIA UR CULT: NORMAL

## 2022-11-01 NOTE — RESULT ENCOUNTER NOTE
Final urine culture report is negative.  Adult Negative Urine culture parameters per protocol: Any # Urogenital single or mixed organism, <10,000 col/ml single organism (cath/midstream), and > 3 organisms (No susceptibilities performed).  [facility] Emergency Dept discharge antibiotic prescribed (If applicable): None  Treatment recommendations per [facility] ED Lab Result Urine Culture protocol.

## 2022-11-03 ENCOUNTER — TELEPHONE (OUTPATIENT)
Dept: ONCOLOGY | Facility: CLINIC | Age: 87
End: 2022-11-03

## 2022-11-03 NOTE — TELEPHONE ENCOUNTER
Patient's daughter in law, Regi, called regarding telephone conversation with Petra. Regi wants to reschedule, she is asking for more information on new chemotherapy plan for Quiana.  Please contact her Friday 11/4 or 11/7 between 8:00am - 12:00pm.

## 2022-11-04 ENCOUNTER — PATIENT OUTREACH (OUTPATIENT)
Dept: ONCOLOGY | Facility: CLINIC | Age: 87
End: 2022-11-04

## 2022-11-04 ENCOUNTER — TELEPHONE (OUTPATIENT)
Dept: PHARMACY | Facility: CLINIC | Age: 87
End: 2022-11-04

## 2022-11-04 LAB
BACTERIA BLD CULT: NO GROWTH
BACTERIA BLD CULT: NO GROWTH

## 2022-11-04 NOTE — PROGRESS NOTES
11/04/22 1146   OTHER   Assessment completed with: Children   Plan of Care Education    Yearly learning assessment completed? Yes (see Education tab)   Diagnosis: progression of multiple myeloma   Does patient understand diagnosis? Yes   Tx plan/regimen: DAratumumab Fas pro injection, Revlimid, Dexamethason   Does patient understand treatment plan/regimen? Yes   Vascular access: Peripheral IV  (for blood transfusion otherwise oral/injections)   Side effect education: Diarrhea/Constipation;Lab value monitoring (anemia, neutropenia, thrombocytopenia);Nausea/Vomiting;Fatigue;Mylosuppression   Transportation means: Regular car;Family   Informal Support system: Children   Coping - concerns/fears reviewed with patient: Yes   Plan of Care: Lab appointment;MD follow-up appointment;Treatment schedule   When to call provider: Bleeding;Increased shortness of breath;New/worsening pain;Shaking chills;Temperature >100.4F;Uncontrolled diarrhea/constipation;Uncontrolled nausea/vomiting   Reasons for deferring treatment reviewed with patient: Yes   Evaluation of Learning   Patient Education Provided Yes   Readiness: Acceptance   Method: Explanation;Literature   Response: Verbalizes understanding   About Me   Current living arrangement: I live alone       Will review with patient on 11/11 with lab draw.    Alecia Briggs, RN

## 2022-11-04 NOTE — TELEPHONE ENCOUNTER
Oral Chemotherapy Monitoring Program   Medication: Revlimid  Rx: 10mg PO daily on days 1 through 14 of 21 day cycle   Auth #: [id number]   Obtained on:11/4/2023  Risk Category: adult female of non child bearing potential  Routine survey questions reviewed.   Rx to be Escribed to [facility]     Terrie Bowen [facility] Liaison  [phone]

## 2022-11-05 ENCOUNTER — APPOINTMENT (OUTPATIENT)
Dept: GENERAL RADIOLOGY | Facility: CLINIC | Age: 87
End: 2022-11-05
Attending: EMERGENCY MEDICINE
Payer: MEDICARE

## 2022-11-05 ENCOUNTER — HOSPITAL ENCOUNTER (EMERGENCY)
Facility: CLINIC | Age: 87
Discharge: HOME | End: 2022-11-05
Attending: EMERGENCY MEDICINE | Admitting: EMERGENCY MEDICINE
Payer: MEDICARE

## 2022-11-05 VITALS
SYSTOLIC BLOOD PRESSURE: 114 MMHG | RESPIRATION RATE: 18 BRPM | DIASTOLIC BLOOD PRESSURE: 51 MMHG | HEIGHT: 64 IN | OXYGEN SATURATION: 94 % | TEMPERATURE: 99.1 F | HEART RATE: 74 BPM | BODY MASS INDEX: 23.05 KG/M2 | WEIGHT: 135 LBS

## 2022-11-05 DIAGNOSIS — J18.9 PNEUMONIA OF LEFT LOWER LOBE DUE TO INFECTIOUS ORGANISM: ICD-10-CM

## 2022-11-05 DIAGNOSIS — U07.1 INFECTION DUE TO 2019 NOVEL CORONAVIRUS: ICD-10-CM

## 2022-11-05 LAB
ANION GAP SERPL CALCULATED.3IONS-SCNC: 2 MMOL/L (ref 3–14)
ATRIAL RATE - MUSE: 78 BPM
BASOPHILS # BLD MANUAL: 0 10E3/UL (ref 0–0.2)
BASOPHILS NFR BLD MANUAL: 0 %
BUN SERPL-MCNC: 21 MG/DL (ref 7–30)
CALCIUM SERPL-MCNC: 8.8 MG/DL (ref 8.5–10.1)
CHLORIDE BLD-SCNC: 105 MMOL/L (ref 94–109)
CO2 SERPL-SCNC: 27 MMOL/L (ref 20–32)
CREAT SERPL-MCNC: 1.08 MG/DL (ref 0.52–1.04)
CRP SERPL-MCNC: 95.3 MG/L (ref 0–8)
DIASTOLIC BLOOD PRESSURE - MUSE: NORMAL MMHG
ELLIPTOCYTES BLD QL SMEAR: SLIGHT
EOSINOPHIL # BLD MANUAL: 0 10E3/UL (ref 0–0.7)
EOSINOPHIL NFR BLD MANUAL: 0 %
ERYTHROCYTE [DISTWIDTH] IN BLOOD BY AUTOMATED COUNT: 16.7 % (ref 10–15)
GFR SERPL CREATININE-BSD FRML MDRD: 50 ML/MIN/1.73M2
GLUCOSE BLD-MCNC: 119 MG/DL (ref 70–99)
HCT VFR BLD AUTO: 28.9 % (ref 35–47)
HGB BLD-MCNC: 9.1 G/DL (ref 11.7–15.7)
HOLD SPECIMEN: NORMAL
INTERPRETATION ECG - MUSE: NORMAL
LACTATE SERPL-SCNC: 1.2 MMOL/L (ref 0.7–2)
LYMPHOCYTES # BLD MANUAL: 1.5 10E3/UL (ref 0.8–5.3)
LYMPHOCYTES NFR BLD MANUAL: 23 %
MCH RBC QN AUTO: 29.9 PG (ref 26.5–33)
MCHC RBC AUTO-ENTMCNC: 31.5 G/DL (ref 31.5–36.5)
MCV RBC AUTO: 95 FL (ref 78–100)
MONOCYTES # BLD MANUAL: 0.9 10E3/UL (ref 0–1.3)
MONOCYTES NFR BLD MANUAL: 14 %
NEUTROPHILS # BLD MANUAL: 4.1 10E3/UL (ref 1.6–8.3)
NEUTROPHILS NFR BLD MANUAL: 63 %
P AXIS - MUSE: 67 DEGREES
PLAT MORPH BLD: ABNORMAL
PLATELET # BLD AUTO: 236 10E3/UL (ref 150–450)
POTASSIUM BLD-SCNC: 4.5 MMOL/L (ref 3.4–5.3)
PR INTERVAL - MUSE: 344 MS
PROCALCITONIN SERPL-MCNC: <0.05 NG/ML
QRS DURATION - MUSE: 148 MS
QT - MUSE: 422 MS
QTC - MUSE: 481 MS
R AXIS - MUSE: -42 DEGREES
RBC # BLD AUTO: 3.04 10E6/UL (ref 3.8–5.2)
RBC MORPH BLD: ABNORMAL
SODIUM SERPL-SCNC: 134 MMOL/L (ref 133–144)
SYSTOLIC BLOOD PRESSURE - MUSE: NORMAL MMHG
T AXIS - MUSE: 63 DEGREES
TROPONIN I SERPL HS-MCNC: 19 NG/L
VENTRICULAR RATE- MUSE: 78 BPM
WBC # BLD AUTO: 6.5 10E3/UL (ref 4–11)

## 2022-11-05 PROCEDURE — 71045 X-RAY EXAM CHEST 1 VIEW: CPT

## 2022-11-05 PROCEDURE — 83605 ASSAY OF LACTIC ACID: CPT | Performed by: EMERGENCY MEDICINE

## 2022-11-05 PROCEDURE — 96360 HYDRATION IV INFUSION INIT: CPT

## 2022-11-05 PROCEDURE — 93005 ELECTROCARDIOGRAM TRACING: CPT

## 2022-11-05 PROCEDURE — 99285 EMERGENCY DEPT VISIT HI MDM: CPT | Mod: 25

## 2022-11-05 PROCEDURE — 84484 ASSAY OF TROPONIN QUANT: CPT | Performed by: EMERGENCY MEDICINE

## 2022-11-05 PROCEDURE — 258N000003 HC RX IP 258 OP 636: Performed by: EMERGENCY MEDICINE

## 2022-11-05 PROCEDURE — 80048 BASIC METABOLIC PNL TOTAL CA: CPT | Performed by: EMERGENCY MEDICINE

## 2022-11-05 PROCEDURE — 84145 PROCALCITONIN (PCT): CPT | Performed by: EMERGENCY MEDICINE

## 2022-11-05 PROCEDURE — 87040 BLOOD CULTURE FOR BACTERIA: CPT | Performed by: EMERGENCY MEDICINE

## 2022-11-05 PROCEDURE — 85014 HEMATOCRIT: CPT | Performed by: EMERGENCY MEDICINE

## 2022-11-05 PROCEDURE — 85007 BL SMEAR W/DIFF WBC COUNT: CPT | Performed by: EMERGENCY MEDICINE

## 2022-11-05 PROCEDURE — 250N000013 HC RX MED GY IP 250 OP 250 PS 637: Performed by: EMERGENCY MEDICINE

## 2022-11-05 PROCEDURE — 86140 C-REACTIVE PROTEIN: CPT | Performed by: EMERGENCY MEDICINE

## 2022-11-05 PROCEDURE — 36415 COLL VENOUS BLD VENIPUNCTURE: CPT | Performed by: EMERGENCY MEDICINE

## 2022-11-05 RX ORDER — DOXYCYCLINE 100 MG/1
100 CAPSULE ORAL ONCE
Status: COMPLETED | OUTPATIENT
Start: 2022-11-05 | End: 2022-11-05

## 2022-11-05 RX ORDER — DOXYCYCLINE 100 MG/1
100 CAPSULE ORAL 2 TIMES DAILY
Qty: 20 CAPSULE | Refills: 0 | Status: SHIPPED | OUTPATIENT
Start: 2022-11-05 | End: 2022-11-15

## 2022-11-05 RX ORDER — SODIUM CHLORIDE 9 MG/ML
INJECTION, SOLUTION INTRAVENOUS CONTINUOUS
Status: DISCONTINUED | OUTPATIENT
Start: 2022-11-05 | End: 2022-11-06 | Stop reason: HOSPADM

## 2022-11-05 RX ADMIN — SODIUM CHLORIDE 500 ML: 9 INJECTION, SOLUTION INTRAVENOUS at 21:47

## 2022-11-05 RX ADMIN — DOXYCYCLINE HYCLATE 100 MG: 100 CAPSULE ORAL at 22:30

## 2022-11-05 ASSESSMENT — ENCOUNTER SYMPTOMS
COUGH: 1
VOMITING: 0
FEVER: 1
BLOOD IN STOOL: 0
DYSURIA: 0
DIARRHEA: 0

## 2022-11-05 ASSESSMENT — ACTIVITIES OF DAILY LIVING (ADL): ADLS_ACUITY_SCORE: 35

## 2022-11-06 NOTE — DISCHARGE INSTRUCTIONS
Discharge Instructions  COVID-19    COVID-19 is the disease caused by a new coronavirus. The virus spreads from person-to-person primarily by droplets when an infected person coughs or sneezes and the droplets are then breathed in by another person.    Symptoms of COVID-19  Many people have no symptoms or mild symptoms.  Symptoms usually appear within a few days, but up to 14-days, after contact with a person with COVID-19.    A mild COVID-19 illness is like a cold and can have fever, cough, sneezing, sore throat, tiredness, headache, and muscle pain.    A moderate COVID-19 illness might include shortness of breath or pneumonia on a chest x-ray.    A severe COVID-19 illness causes significant breathing problems such as low oxygen levels or more serious pneumonia.  Some patients experience loss of taste or smell which is somewhat unique to COVID-19.      Isolation and Quarantine  Testing is recommended for any person with symptoms that could be COVID-19 and often for those exposed to COVID-19. The best way to stop the spread of the virus is to avoid contact with others.    A close contact exposure is being within 6 feet of someone with COVID for 15 minutes.    Isolation refers to sick people staying away from people who are not sick.    A person in quarantine is limiting activity because they were exposed and are waiting to see if they might become sick.    If you test positive for COVID and have no symptoms, you should stay home (isolation) for 5 full days after the day of the test. You should then wear a mask when around others for another 5 days.    If you test positive for COVID and have mild symptoms, you should stay home (isolation) for at least 5 days after your symptoms began. You can return to normal activities at that time, wearing a mask when around others, for another 5 days as long as your symptoms are improving/resolving and you have been without a fever for 24 hours (without using fever-reducing  medicine).    If you test positive for COVID and have more than mild symptoms, you should stay home (isolation) for at least 10 days after your symptoms began. You can return to normal activities at that time as long as your symptoms are improving and you have been without a fever for 24 hours (without using fever-reducing medicine).  For example, if you have a fever and cough for 6 days, you need to stay home 4 more days with no fever for a total of 10 days. Or, if you have a fever and cough for 10 days, you need to stay home one more day with no fever for a total of 11 days.    If you were exposed to COVID and are not vaccinated (or it has been more than six months from your Pfizer or Moderna vaccine or two months from J&J vaccine), you should stay home (quarantine) for 5 days and then wear a mask around others for 5 additional days. A COVID test at day 5 is recommended.    If you were exposed to COVID and are vaccinated (had a booster, had two shots of Pfizer or Moderna vaccine in the last five months, or had J&J vaccine within two months), you do not need to quarantine but should wear a mask around others for 10 days and get a COVID test on day 5.    If you have symptoms but a negative test, you should stay at home until you have mild/improving symptoms and are without fever for 24 hours, using the same judgment you would for when it is safe to return to work/school from strep throat, influenza, or the common cold. If you worsen, you should consider being re-evaluated.    If you are being tested for COVID because of symptoms and your test is pending, you should stay home until you know your test result.  More details on isolation and quarantine can be found on this website from the CDC:  https://www.cdc.gov/coronavirus/2019-ncov/your-health/quarantine-isolation.html    If I have COVID, how should I protect myself and others?    Do not go to work or school. Have a friend or relative do your shopping. Do not use  public transportation (bus, train) or ridesharing (Lyft, Uber).    Separate yourself from other people in your home. As much as possible, you should stay in one room and away from other people in your home. Also, use a separate bathroom, if possible. Avoid handling pets or other animals while sick.     Wear a facemask if you need to be around other people and cover your mouth and nose with a tissue when you cough or sneeze.     Avoid sharing personal household items. You should not share dishes, drinking glasses, forks/knives/spoons, towels, or bedding with other people in your home. After using these items, they should be washed with soap and water. Clean parts of your home that are touched often (doorknobs, faucets, countertops, etc.) daily.     Wash your hands often with soap and water for at least 20 seconds or use an alcohol-based hand  containing at least 60% alcohol.     Avoid touching your face.    Treat your symptoms. You can take Acetaminophen (Tylenol) to treat body aches and fever as needed for comfort. Ibuprofen (Advil or Motrin) can be used as well if you still have symptoms after taking Tylenol. Drink fluids. Rest.    Watch for worsening symptoms such as shortness of breath/difficulty breathing or very severe weakness.    Employers/workplaces are being asked by the Centers for Disease Control (CDC) to not request notes/documentation for you to return to work or prove that you were ill. You may choose to show your employer this paperwork. Also, repeat testing should not be required to return to work.    Exercise/Sports in rare cases, COVID could affect your heart in a way that makes exercise or participation in sports dangerous.  If you have a mild COVID illness (fever, cough, sore throat, and similar symptoms but no difficulty breathing or abnormalities of the lung): After your COVID symptoms have resolved, wait 14-days before returning to activity.  If you have more than a mild illness  (meaning that you have problems with your breathing or lungs) or if you participate in competitive or strenuous activity or have a history of heart disease: Please see your primary doctor/provider prior to return to activity/competition.    COVID treatments such as antiviral and antibody medications are available. They are recommended for those patients who have a risk for developing more severe COVID illness. Importantly, the treatments must be started early in the illness (within 5-7 days, depending on which treatment). These treatments may have been considered today during your visit. If you have other questions, contact your primary doctor/clinic.     You can learn more about COVID treatments from the [facility]:  [email or web address].[facility]./diseases/coronavirus/meds.html    Return to the Emergency Department if:    If you are developing worsening breathing, shortness of breath, or feel worse you should seek medical attention.  If you are uncertain, contact your health care provider/clinic. If you need emergency medical attention, call 911 and tell them you have been ill.     Discharge Instructions  Bronchitis, Pneumonia, Bronchospasm    You were seen today for a chest infection or inflammation. If your provider decided this was due to a bacterial infection, you may need an antibiotic. Sometimes these are caused by a virus, and then an antibiotic will not help.     Generally, every Emergency Department visit should have a follow-up clinic visit with either a primary or a specialty clinic/provider. Please follow-up as instructed by your emergency provider today.    Return to the Emergency Department if:  Your breathing gets much worse.  You are very weak, or feel much more ill.  You develop new symptoms, such as chest pain.  You cough up blood.  You are vomiting (throwing up) enough that you cannot keep fluids or your medicine down.    What can I do to help myself?  Fill any prescriptions  the provider gave you and take them right away--especially antibiotics. Be sure to finish the whole antibiotic prescription.  You may be given a prescription for an inhaler, which can help loosen tight air passages.  Use this as needed, but not more often than directed. Inhalers work much better when used with a spacer.   You may be given a prescription for a steroid to reduce inflammation. Used long-term, these can have side effects, but for short-term use they are safe. You may notice restlessness or increased appetite.      You may use non-prescription cough or cold medicines. Cough medicines may help, but don t make the cough go away completely.   Avoid smoke, because this can make your symptoms worse. If you smoke, this may be a good time to quit! Consider using nicotine lozenges, gum, or patches to reduce cravings.   If you have a fever, Tylenol  (acetaminophen), Motrin  (ibuprofen), or Advil  (ibuprofen) may help bring fever down and may help you feel more comfortable. Be sure to read and follow the package directions, and ask your provider if you have questions.  Be sure to get your flu shot each year.  For certain ages, the pneumonia shot can help prevent pneumonia.  If you were given a prescription for medicine here today, be sure to read all of the information (including the package insert) that comes with your prescription.  This will include important information about the medicine, its side effects, and any warnings that you need to know about.  The pharmacist who fills the prescription can provide more information and answer questions you may have about the medicine.  If you have questions or concerns that the pharmacist cannot address, please call or return to the Emergency Department.     Remember that you can always come back to the Emergency Department if you are not able to see your regular provider in the amount of time listed above, if you get any new symptoms, or if there is anything that  worries you.

## 2022-11-06 NOTE — ED TRIAGE NOTES
Pt is begin treated for cancer with oral chemotherapy. Fever at home up to 101.5, sent from primary care provider for further eval.      Triage Assessment     Row Name 11/05/22 2008       Triage Assessment (Adult)    Airway WDL WDL       Respiratory WDL    Respiratory WDL WDL       Skin Circulation/Temperature WDL    Skin Circulation/Temperature WDL WDL       Cardiac WDL    Cardiac WDL WDL       Peripheral/Neurovascular WDL    Peripheral Neurovascular WDL WDL       Cognitive/Neuro/Behavioral WDL    Cognitive/Neuro/Behavioral WDL WDL

## 2022-11-06 NOTE — ED PROVIDER NOTES
History   Chief Complaint:  Fever       The history is provided by the patient and a relative (daughter-in-law).      Quiana Dunaway is a 87 year old female with history of multiple myeloma with chemotherapy who presents with fever and COVID positive. She reported to the ED 6 days ago and was diagnosed with COVID. She was started on Paxlovid and has completed that course now. During her course of Paxlovid she did not have a fever. She has a cough with mucus production. She denies vomiting, diarrhea, blood in her stool, chest pain, rashes, leg swelling, or dysuria. She last took Tylenol at 1700.     Review of Systems   Constitutional: Positive for fever.   Respiratory: Positive for cough.    Cardiovascular: Negative for chest pain and leg swelling.   Gastrointestinal: Negative for blood in stool, diarrhea and vomiting.   Genitourinary: Negative for dysuria.   Skin: Negative for rash.   All other systems reviewed and are negative.    Allergies:   Levaquin     Medications:  Acyclovir  Dexamethasone  Famotidine  Furosemide  Lenalidomide  Lisinopril  Lorazepam  Methimazole  Mirabegron  Mirtazapine  Nitroglycerine  Ondansetron  Potassium chloride  Prochlorperazine   Aspirin 81 MG  Loperamide     Past Medical History:     Hypertension  Multiple myeloma  ROBER  Stage 3 CKD  CHF  Sick sinus syndrome  Overactive bladder  Neutropenia  Osteopenia  Vitamin B 12 deficiency, non anemic  Apathetic thyrotoxicosis  Toxic multinodal goiter  Secondary malignant neoplasm of bone     Past Surgical History:    Cataract removal  Bone marrow biopsy, bone specimen, needle/trocar  Implant pacemaker      Family History:    Mother- breast cancer  Brother- colon cancer  Sister- colon cancer    Social History:  The patient presents to the ED with her son and daughter-in-law.  PCP: César Chung     Physical Exam     Patient Vitals for the past 24 hrs:   BP Temp Temp src Pulse Resp SpO2 Height Weight   11/05/22 2130 114/51 -- -- 74 -- 94 % --  "--   11/05/22 2100 98/56 -- -- 81 -- 95 % -- --   11/05/22 2030 102/59 -- -- 79 -- 93 % -- --   11/05/22 2007 123/45 99.1  F (37.3  C) Temporal 83 18 94 % 1.626 m (5' 4\") 61.2 kg (135 lb)       Physical Exam  Eyes:  The pupils are equal and round    Conjunctivae and sclerae are normal  ENT:    The nose is normal    Pinnae are normal  CV:  Regular rate and rhythm     No edema  Resp:  Lungs are clear    Non-labored    No rales    No wheezing   GI:  Abdomen is soft and non-tender, there is no rigidity    No distension    No rebound tenderness   MS:  Normal muscular tone    No asymmetric leg swelling  Skin:  No rash or acute skin lesions noted  Neuro:   Awake, alert.      Speech is normal and fluent.    Face is symmetric.     Moves all extremities    Emergency Department Course   ECG  ECG taken at 2039, ECG read at 2046  Atrial-sensed ventricular-paced rhythm with prolonged AV conduction  Abnormal ECG   Rate 78 bpm. PA interval 344 ms. QRS duration 148 ms. QT/QTc 422/481 ms. P-R-T axes 67 -42 63.     Imaging:  XR Chest Port 1 View   Final Result   IMPRESSION: There is a new slight infiltrate and/or atelectasis seen in the left lung base medially. There is a new thin linear area of increased density projected over the upper aspect of the left hemithorax laterally presumed to represent overlying    artifact. The chest is otherwise stable with again seen transvenous pacer with associated lead tip is projected over the RA and RV. Mild hyperinflation of both lungs. Partially calcified thoracic aorta. Mild to moderate scattered degenerative changes of    the bony skeleton, most marked in the spine.        Report per radiology    Laboratory:  Labs Ordered and Resulted from Time of ED Arrival to Time of ED Departure   BASIC METABOLIC PANEL - Abnormal       Result Value    Sodium 134      Potassium 4.5      Chloride 105      Carbon Dioxide (CO2) 27      Anion Gap 2 (*)     Urea Nitrogen 21      Creatinine 1.08 (*)     Calcium " 8.8      Glucose 119 (*)     GFR Estimate 50 (*)    CRP INFLAMMATION - Abnormal    CRP Inflammation 95.3 (*)    CBC WITH PLATELETS AND DIFFERENTIAL - Abnormal    WBC Count 6.5      RBC Count 3.04 (*)     Hemoglobin 9.1 (*)     Hematocrit 28.9 (*)     MCV 95      MCH 29.9      MCHC 31.5      RDW 16.7 (*)     Platelet Count 236     DIFFERENTIAL - Abnormal    % Neutrophils 63      % Lymphocytes 23      % Monocytes 14      % Eosinophils 0      % Basophils 0      Absolute Neutrophils 4.1      Absolute Lymphocytes 1.5      Absolute Monocytes 0.9      Absolute Eosinophils 0.0      Absolute Basophils 0.0      RBC Morphology Confirmed RBC Indices      Platelet Assessment        Value: Automated Count Confirmed. Platelet morphology is normal.    Elliptocytes Slight (*)    LACTIC ACID WHOLE BLOOD - Normal    Lactic Acid 1.2     TROPONIN I - Normal    Troponin I High Sensitivity 19     PROCALCITONIN - Normal    Procalcitonin <0.05     BLOOD CULTURE   BLOOD CULTURE     Emergency Department Course:    Reviewed:  I reviewed nursing notes, vitals, past medical history and Care Everywhere    Assessments:  1807 I obtained history and examined the patient as noted above.   2142 I rechecked the patient and explained findings.       Interventions:  Medications   0.9% sodium chloride BOLUS (0 mLs Intravenous Stopped 11/5/22 2234)     Followed by   sodium chloride 0.9% infusion (has no administration in time range)   doxycycline hyclate (VIBRAMYCIN) capsule 100 mg (100 mg Oral Given 11/5/22 2230)       Disposition:  The patient was discharged to home.     Impression & Plan     Medical Decision Making:  Quiana Dunaway is a 87 year old female who presents the emergency department with fever.  She was diagnosed with COVID-19 6 days ago.  She is just completed her course of Paxil bed.  She developed a fever again.  She has had some cough has been productive.  Denies significant weakness.  No chest pain or shortness of breath.  Work-up here  reveals an elevated CRP.  Her white blood cell count is normal and procalcitonin level is low.  There is possibly a new infiltrate in her lung.  Unclear if this represents an early bacterial pneumonia.  Given her history of multiple myeloma currently undergoing treatment will err on the side of treating more aggressively with antibiotics.  She is given a dose of oral doxycycline here to initiate for possible pneumonia.  She is given a prescription for this to use at home as well.  She is very motivated to be discharged.  She not hypoxic.  She not having any chest pain or shortness of breath.  Work-up aside from the elevated CRP and the chest x-ray with a possible new infiltrate work-up is reassuring.  She is encouraged to follow-up with her oncology team and come back to the emergency department with any new or worrisome symptoms.    Creatinine is slightly above baseline and she was given saline prior to discharge.    Diagnosis:    ICD-10-CM    1. Infection due to 2019 novel coronavirus  U07.1       2. Pneumonia of left lower lobe due to infectious organism  J18.9           Discharge Medications:  New Prescriptions    DOXYCYCLINE HYCLATE (VIBRAMYCIN) 100 MG CAPSULE    Take 1 capsule (100 mg) by mouth 2 times daily for 10 days       Scribe Disclosure:  VINAY, EMILY POWELL, am serving as a scribe at 7:58 PM on 11/5/2022 to document services personally performed by Vlad Tolentino MD based on my observations and the provider's statements to me.        Vlad Tolentino MD  11/05/22 9148

## 2022-11-07 ENCOUNTER — TELEPHONE (OUTPATIENT)
Dept: ONCOLOGY | Facility: CLINIC | Age: 87
End: 2022-11-07

## 2022-11-07 NOTE — TELEPHONE ENCOUNTER
Johnathan (daughter) is calling wanting to talk about possibly pushing back the infusion on 11/14/22 for Quiana.  Quiana was in the ER this weekend and still not feeling well.  Please call Johnathan back at [phone].  Thank you,     Devika Perez  Lead, Scheduling [facility] [phone]  [place] [phone]

## 2022-11-07 NOTE — TELEPHONE ENCOUNTER
Writer called and LVM with patient's daughter Regi stating will follow-up on Wednesday, 11/9 and then determine about rescheduling.    Alecia Briggs RN

## 2022-11-09 NOTE — TELEPHONE ENCOUNTER
Writer called and  spoke Regi and she states that patient has not had any fevers since 11/5, cough is slowly improving, appetite is poor right now. Regi is helping patient find foods that patient is interested in . Writer also dicussed with Regi having patient drink Boost or Ensure in the morning.     Writer advised that patient keep lab appointment and appointments for 11/14 to discuss resuming REvlimid and if treatment as scheduled should start.     Alecia Briggs RN

## 2022-11-10 DIAGNOSIS — E53.8 VITAMIN B12 DEFICIENCY (NON ANEMIC): ICD-10-CM

## 2022-11-10 LAB
ABO/RH(D): NORMAL
ANTIBODY SCREEN: NEGATIVE
BACTERIA BLD CULT: NO GROWTH
BACTERIA BLD CULT: NO GROWTH
SPECIMEN EXPIRATION DATE: NORMAL

## 2022-11-11 ENCOUNTER — LAB (OUTPATIENT)
Dept: INFUSION THERAPY | Facility: CLINIC | Age: 87
End: 2022-11-11
Attending: INTERNAL MEDICINE
Payer: MEDICARE

## 2022-11-11 ENCOUNTER — DOCUMENTATION ONLY (OUTPATIENT)
Dept: ONCOLOGY | Facility: CLINIC | Age: 87
End: 2022-11-11

## 2022-11-11 DIAGNOSIS — C90.00 MULTIPLE MYELOMA NOT HAVING ACHIEVED REMISSION (H): Primary | ICD-10-CM

## 2022-11-11 LAB
ALBUMIN SERPL-MCNC: 3.1 G/DL (ref 3.4–5)
ALP SERPL-CCNC: 78 U/L (ref 40–150)
ALT SERPL W P-5'-P-CCNC: 13 U/L (ref 0–50)
ANION GAP SERPL CALCULATED.3IONS-SCNC: 9 MMOL/L (ref 3–14)
AST SERPL W P-5'-P-CCNC: 12 U/L (ref 0–45)
BASOPHILS # BLD AUTO: 0 10E3/UL (ref 0–0.2)
BASOPHILS NFR BLD AUTO: 1 %
BILIRUB SERPL-MCNC: 0.3 MG/DL (ref 0.2–1.3)
BUN SERPL-MCNC: 32 MG/DL (ref 7–30)
CALCIUM SERPL-MCNC: 9.4 MG/DL (ref 8.5–10.1)
CHLORIDE BLD-SCNC: 109 MMOL/L (ref 94–109)
CO2 SERPL-SCNC: 23 MMOL/L (ref 20–32)
CREAT SERPL-MCNC: 1.07 MG/DL (ref 0.52–1.04)
EOSINOPHIL # BLD AUTO: 0.1 10E3/UL (ref 0–0.7)
EOSINOPHIL NFR BLD AUTO: 2 %
ERYTHROCYTE [DISTWIDTH] IN BLOOD BY AUTOMATED COUNT: 15.9 % (ref 10–15)
GFR SERPL CREATININE-BSD FRML MDRD: 50 ML/MIN/1.73M2
GLUCOSE BLD-MCNC: 117 MG/DL (ref 70–99)
HCT VFR BLD AUTO: 29.9 % (ref 35–47)
HGB BLD-MCNC: 9.6 G/DL (ref 11.7–15.7)
IMM GRANULOCYTES # BLD: 0 10E3/UL
IMM GRANULOCYTES NFR BLD: 1 %
LYMPHOCYTES # BLD AUTO: 1.7 10E3/UL (ref 0.8–5.3)
LYMPHOCYTES NFR BLD AUTO: 49 %
MCH RBC QN AUTO: 30.5 PG (ref 26.5–33)
MCHC RBC AUTO-ENTMCNC: 32.1 G/DL (ref 31.5–36.5)
MCV RBC AUTO: 95 FL (ref 78–100)
MONOCYTES # BLD AUTO: 0.5 10E3/UL (ref 0–1.3)
MONOCYTES NFR BLD AUTO: 16 %
NEUTROPHILS # BLD AUTO: 1 10E3/UL (ref 1.6–8.3)
NEUTROPHILS NFR BLD AUTO: 31 %
NRBC # BLD AUTO: 0 10E3/UL
NRBC BLD AUTO-RTO: 0 /100
PLATELET # BLD AUTO: 217 10E3/UL (ref 150–450)
POTASSIUM BLD-SCNC: 4.3 MMOL/L (ref 3.4–5.3)
PROT SERPL-MCNC: 7.2 G/DL (ref 6.8–8.8)
RBC # BLD AUTO: 3.15 10E6/UL (ref 3.8–5.2)
SODIUM SERPL-SCNC: 141 MMOL/L (ref 133–144)
WBC # BLD AUTO: 3.4 10E3/UL (ref 4–11)

## 2022-11-11 PROCEDURE — 0001U RBC DNA HEA 35 AG 11 BLD GRP: CPT | Performed by: INTERNAL MEDICINE

## 2022-11-11 PROCEDURE — 86850 RBC ANTIBODY SCREEN: CPT | Performed by: INTERNAL MEDICINE

## 2022-11-11 PROCEDURE — 36415 COLL VENOUS BLD VENIPUNCTURE: CPT

## 2022-11-11 PROCEDURE — 85025 COMPLETE CBC W/AUTO DIFF WBC: CPT | Performed by: INTERNAL MEDICINE

## 2022-11-11 PROCEDURE — 86901 BLOOD TYPING SEROLOGIC RH(D): CPT | Performed by: INTERNAL MEDICINE

## 2022-11-11 PROCEDURE — 80053 COMPREHEN METABOLIC PANEL: CPT | Performed by: INTERNAL MEDICINE

## 2022-11-11 RX ORDER — LANOLIN ALCOHOL/MO/W.PET/CERES
1000 CREAM (GRAM) TOPICAL DAILY
Qty: 90 TABLET | Refills: 2 | Status: SHIPPED | OUTPATIENT
Start: 2022-11-11 | End: 2023-07-12

## 2022-11-11 NOTE — TELEPHONE ENCOUNTER
Prescription approved per [facility] Refill Protocol.    Silverio Herrera RN  [facility]

## 2022-11-11 NOTE — PROGRESS NOTES
Medical Assistant Note:  Quiana Dunaway presents today for blood draw.    Patient seen by provider today: No.   present during visit today: Not Applicable.    Concerns: No Concerns.    Procedure:  Lab draw site: right hand, Needle type: bf, Gauge: 23.    Post Assessment:  Labs drawn without difficulty: Yes.    Discharge Plan:  Departure Mode: Ambulatory.    Face to Face Time: 5 min.    Mariela Lyn, CMA

## 2022-11-14 ENCOUNTER — INFUSION THERAPY VISIT (OUTPATIENT)
Dept: INFUSION THERAPY | Facility: CLINIC | Age: 87
End: 2022-11-14
Attending: INTERNAL MEDICINE
Payer: MEDICARE

## 2022-11-14 ENCOUNTER — ONCOLOGY VISIT (OUTPATIENT)
Dept: ONCOLOGY | Facility: CLINIC | Age: 87
End: 2022-11-14
Attending: INTERNAL MEDICINE
Payer: MEDICARE

## 2022-11-14 VITALS
WEIGHT: 129.8 LBS | RESPIRATION RATE: 16 BRPM | HEART RATE: 72 BPM | TEMPERATURE: 98.2 F | BODY MASS INDEX: 22.28 KG/M2 | DIASTOLIC BLOOD PRESSURE: 77 MMHG | OXYGEN SATURATION: 97 % | SYSTOLIC BLOOD PRESSURE: 134 MMHG

## 2022-11-14 VITALS
HEART RATE: 69 BPM | RESPIRATION RATE: 16 BRPM | DIASTOLIC BLOOD PRESSURE: 69 MMHG | OXYGEN SATURATION: 95 % | SYSTOLIC BLOOD PRESSURE: 104 MMHG | TEMPERATURE: 98.5 F

## 2022-11-14 DIAGNOSIS — C90.00 MULTIPLE MYELOMA NOT HAVING ACHIEVED REMISSION (H): Primary | ICD-10-CM

## 2022-11-14 DIAGNOSIS — I50.32 CHRONIC DIASTOLIC CONGESTIVE HEART FAILURE (H): ICD-10-CM

## 2022-11-14 DIAGNOSIS — J12.82 PNEUMONIA DUE TO 2019 NOVEL CORONAVIRUS: ICD-10-CM

## 2022-11-14 DIAGNOSIS — R63.4 WEIGHT LOSS: ICD-10-CM

## 2022-11-14 DIAGNOSIS — U07.1 PNEUMONIA DUE TO 2019 NOVEL CORONAVIRUS: ICD-10-CM

## 2022-11-14 LAB — TOTAL PROTEIN SERUM FOR ELP: 6 G/DL (ref 6.4–8.3)

## 2022-11-14 PROCEDURE — G0463 HOSPITAL OUTPT CLINIC VISIT: HCPCS | Mod: 25

## 2022-11-14 PROCEDURE — 99214 OFFICE O/P EST MOD 30 MIN: CPT | Performed by: PHYSICIAN ASSISTANT

## 2022-11-14 PROCEDURE — 250N000013 HC RX MED GY IP 250 OP 250 PS 637: Performed by: INTERNAL MEDICINE

## 2022-11-14 PROCEDURE — 83521 IG LIGHT CHAINS FREE EACH: CPT | Mod: 59 | Performed by: INTERNAL MEDICINE

## 2022-11-14 PROCEDURE — 84155 ASSAY OF PROTEIN SERUM: CPT | Performed by: INTERNAL MEDICINE

## 2022-11-14 PROCEDURE — 84165 PROTEIN E-PHORESIS SERUM: CPT | Mod: TC | Performed by: PATHOLOGY

## 2022-11-14 PROCEDURE — 250N000011 HC RX IP 250 OP 636: Performed by: INTERNAL MEDICINE

## 2022-11-14 PROCEDURE — 82784 ASSAY IGA/IGD/IGG/IGM EACH: CPT | Performed by: INTERNAL MEDICINE

## 2022-11-14 PROCEDURE — 36415 COLL VENOUS BLD VENIPUNCTURE: CPT | Performed by: INTERNAL MEDICINE

## 2022-11-14 PROCEDURE — 96401 CHEMO ANTI-NEOPL SQ/IM: CPT

## 2022-11-14 RX ORDER — HEPARIN SODIUM (PORCINE) LOCK FLUSH IV SOLN 100 UNIT/ML 100 UNIT/ML
5 SOLUTION INTRAVENOUS
Status: CANCELLED | OUTPATIENT
Start: 2022-11-21

## 2022-11-14 RX ORDER — DIPHENHYDRAMINE HCL 25 MG
50 CAPSULE ORAL ONCE
Status: CANCELLED | OUTPATIENT
Start: 2022-11-21

## 2022-11-14 RX ORDER — LORAZEPAM 2 MG/ML
0.5 INJECTION INTRAMUSCULAR EVERY 4 HOURS PRN
Status: CANCELLED | OUTPATIENT
Start: 2022-11-21

## 2022-11-14 RX ORDER — HEPARIN SODIUM,PORCINE 10 UNIT/ML
5 VIAL (ML) INTRAVENOUS
Status: CANCELLED | OUTPATIENT
Start: 2022-11-28

## 2022-11-14 RX ORDER — DEXAMETHASONE 4 MG/1
12 TABLET ORAL ONCE
Status: CANCELLED | OUTPATIENT
Start: 2022-11-28

## 2022-11-14 RX ORDER — DEXAMETHASONE 4 MG/1
20 TABLET ORAL ONCE
Status: CANCELLED | OUTPATIENT
Start: 2022-11-14

## 2022-11-14 RX ORDER — EPINEPHRINE 1 MG/ML
0.3 INJECTION, SOLUTION, CONCENTRATE INTRAVENOUS EVERY 5 MIN PRN
Status: CANCELLED | OUTPATIENT
Start: 2022-11-21

## 2022-11-14 RX ORDER — DIPHENHYDRAMINE HCL 25 MG
50 CAPSULE ORAL
Status: CANCELLED | OUTPATIENT
Start: 2022-12-05

## 2022-11-14 RX ORDER — ACETAMINOPHEN 325 MG/1
650 TABLET ORAL ONCE
Status: CANCELLED | OUTPATIENT
Start: 2022-11-21

## 2022-11-14 RX ORDER — HEPARIN SODIUM,PORCINE 10 UNIT/ML
5 VIAL (ML) INTRAVENOUS
Status: CANCELLED | OUTPATIENT
Start: 2022-11-21

## 2022-11-14 RX ORDER — EPINEPHRINE 1 MG/ML
0.3 INJECTION, SOLUTION, CONCENTRATE INTRAVENOUS EVERY 5 MIN PRN
Status: CANCELLED | OUTPATIENT
Start: 2022-11-14

## 2022-11-14 RX ORDER — MONTELUKAST SODIUM 10 MG/1
10 TABLET ORAL ONCE
Status: CANCELLED | OUTPATIENT
Start: 2022-11-21

## 2022-11-14 RX ORDER — ALBUTEROL SULFATE 90 UG/1
1-2 AEROSOL, METERED RESPIRATORY (INHALATION)
Status: CANCELLED
Start: 2022-11-28

## 2022-11-14 RX ORDER — EPINEPHRINE 1 MG/ML
0.3 INJECTION, SOLUTION, CONCENTRATE INTRAVENOUS EVERY 5 MIN PRN
Status: CANCELLED | OUTPATIENT
Start: 2022-11-28

## 2022-11-14 RX ORDER — ACETAMINOPHEN 325 MG/1
650 TABLET ORAL ONCE
Status: CANCELLED | OUTPATIENT
Start: 2022-11-14

## 2022-11-14 RX ORDER — DEXAMETHASONE 4 MG/1
20 TABLET ORAL ONCE
Status: COMPLETED | OUTPATIENT
Start: 2022-11-14 | End: 2022-11-14

## 2022-11-14 RX ORDER — ACETAMINOPHEN 325 MG/1
650 TABLET ORAL ONCE
Status: COMPLETED | OUTPATIENT
Start: 2022-11-14 | End: 2022-11-14

## 2022-11-14 RX ORDER — HEPARIN SODIUM (PORCINE) LOCK FLUSH IV SOLN 100 UNIT/ML 100 UNIT/ML
5 SOLUTION INTRAVENOUS
Status: CANCELLED | OUTPATIENT
Start: 2022-12-05

## 2022-11-14 RX ORDER — LORAZEPAM 2 MG/ML
0.5 INJECTION INTRAMUSCULAR EVERY 4 HOURS PRN
Status: CANCELLED | OUTPATIENT
Start: 2022-12-05

## 2022-11-14 RX ORDER — ALBUTEROL SULFATE 90 UG/1
1-2 AEROSOL, METERED RESPIRATORY (INHALATION)
Status: CANCELLED
Start: 2022-11-21

## 2022-11-14 RX ORDER — HEPARIN SODIUM,PORCINE 10 UNIT/ML
5 VIAL (ML) INTRAVENOUS
Status: CANCELLED | OUTPATIENT
Start: 2022-11-14

## 2022-11-14 RX ORDER — EPINEPHRINE 1 MG/ML
0.3 INJECTION, SOLUTION, CONCENTRATE INTRAVENOUS EVERY 5 MIN PRN
Status: CANCELLED | OUTPATIENT
Start: 2022-12-05

## 2022-11-14 RX ORDER — DEXAMETHASONE 4 MG/1
12 TABLET ORAL
Status: CANCELLED | OUTPATIENT
Start: 2022-12-05

## 2022-11-14 RX ORDER — MONTELUKAST SODIUM 10 MG/1
10 TABLET ORAL ONCE
Status: CANCELLED | OUTPATIENT
Start: 2022-11-28

## 2022-11-14 RX ORDER — DIPHENHYDRAMINE HCL 25 MG
50 CAPSULE ORAL ONCE
Status: COMPLETED | OUTPATIENT
Start: 2022-11-14 | End: 2022-11-14

## 2022-11-14 RX ORDER — DIPHENHYDRAMINE HYDROCHLORIDE 50 MG/ML
50 INJECTION INTRAMUSCULAR; INTRAVENOUS
Status: CANCELLED
Start: 2022-11-14

## 2022-11-14 RX ORDER — DIPHENHYDRAMINE HCL 25 MG
50 CAPSULE ORAL ONCE
Status: CANCELLED | OUTPATIENT
Start: 2022-11-28

## 2022-11-14 RX ORDER — MEPERIDINE HYDROCHLORIDE 25 MG/ML
25 INJECTION INTRAMUSCULAR; INTRAVENOUS; SUBCUTANEOUS EVERY 30 MIN PRN
Status: CANCELLED | OUTPATIENT
Start: 2022-12-05

## 2022-11-14 RX ORDER — ACETAMINOPHEN 325 MG/1
650 TABLET ORAL
Status: CANCELLED | OUTPATIENT
Start: 2022-12-05

## 2022-11-14 RX ORDER — MEPERIDINE HYDROCHLORIDE 25 MG/ML
25 INJECTION INTRAMUSCULAR; INTRAVENOUS; SUBCUTANEOUS EVERY 30 MIN PRN
Status: CANCELLED | OUTPATIENT
Start: 2022-11-28

## 2022-11-14 RX ORDER — MONTELUKAST SODIUM 10 MG/1
10 TABLET ORAL ONCE
Status: COMPLETED | OUTPATIENT
Start: 2022-11-14 | End: 2022-11-14

## 2022-11-14 RX ORDER — ALBUTEROL SULFATE 0.83 MG/ML
2.5 SOLUTION RESPIRATORY (INHALATION)
Status: CANCELLED | OUTPATIENT
Start: 2022-11-28

## 2022-11-14 RX ORDER — HEPARIN SODIUM,PORCINE 10 UNIT/ML
5 VIAL (ML) INTRAVENOUS
Status: CANCELLED | OUTPATIENT
Start: 2022-12-05

## 2022-11-14 RX ORDER — DIPHENHYDRAMINE HYDROCHLORIDE 50 MG/ML
50 INJECTION INTRAMUSCULAR; INTRAVENOUS
Status: CANCELLED
Start: 2022-11-21

## 2022-11-14 RX ORDER — MONTELUKAST SODIUM 10 MG/1
10 TABLET ORAL ONCE
Status: CANCELLED | OUTPATIENT
Start: 2022-11-14

## 2022-11-14 RX ORDER — DIPHENHYDRAMINE HCL 25 MG
50 CAPSULE ORAL ONCE
Status: CANCELLED | OUTPATIENT
Start: 2022-11-14

## 2022-11-14 RX ORDER — ALBUTEROL SULFATE 0.83 MG/ML
2.5 SOLUTION RESPIRATORY (INHALATION)
Status: CANCELLED | OUTPATIENT
Start: 2022-11-14

## 2022-11-14 RX ORDER — MEPERIDINE HYDROCHLORIDE 25 MG/ML
25 INJECTION INTRAMUSCULAR; INTRAVENOUS; SUBCUTANEOUS EVERY 30 MIN PRN
Status: CANCELLED | OUTPATIENT
Start: 2022-11-21

## 2022-11-14 RX ORDER — ALBUTEROL SULFATE 90 UG/1
1-2 AEROSOL, METERED RESPIRATORY (INHALATION)
Status: CANCELLED
Start: 2022-11-14

## 2022-11-14 RX ORDER — DIPHENHYDRAMINE HYDROCHLORIDE 50 MG/ML
50 INJECTION INTRAMUSCULAR; INTRAVENOUS
Status: CANCELLED
Start: 2022-11-28

## 2022-11-14 RX ORDER — ACETAMINOPHEN 325 MG/1
650 TABLET ORAL ONCE
Status: CANCELLED | OUTPATIENT
Start: 2022-11-28

## 2022-11-14 RX ORDER — DIPHENHYDRAMINE HYDROCHLORIDE 50 MG/ML
50 INJECTION INTRAMUSCULAR; INTRAVENOUS
Status: CANCELLED
Start: 2022-12-05

## 2022-11-14 RX ORDER — ALBUTEROL SULFATE 0.83 MG/ML
2.5 SOLUTION RESPIRATORY (INHALATION)
Status: CANCELLED | OUTPATIENT
Start: 2022-11-21

## 2022-11-14 RX ORDER — LORAZEPAM 2 MG/ML
0.5 INJECTION INTRAMUSCULAR EVERY 4 HOURS PRN
Status: CANCELLED | OUTPATIENT
Start: 2022-11-28

## 2022-11-14 RX ORDER — ALBUTEROL SULFATE 0.83 MG/ML
2.5 SOLUTION RESPIRATORY (INHALATION)
Status: CANCELLED | OUTPATIENT
Start: 2022-12-05

## 2022-11-14 RX ORDER — LORAZEPAM 2 MG/ML
0.5 INJECTION INTRAMUSCULAR EVERY 4 HOURS PRN
Status: CANCELLED | OUTPATIENT
Start: 2022-11-14

## 2022-11-14 RX ORDER — DEXAMETHASONE 4 MG/1
20 TABLET ORAL ONCE
Status: CANCELLED | OUTPATIENT
Start: 2022-11-21

## 2022-11-14 RX ORDER — ALBUTEROL SULFATE 90 UG/1
1-2 AEROSOL, METERED RESPIRATORY (INHALATION)
Status: CANCELLED
Start: 2022-12-05

## 2022-11-14 RX ORDER — HEPARIN SODIUM (PORCINE) LOCK FLUSH IV SOLN 100 UNIT/ML 100 UNIT/ML
5 SOLUTION INTRAVENOUS
Status: CANCELLED | OUTPATIENT
Start: 2022-11-28

## 2022-11-14 RX ORDER — MEPERIDINE HYDROCHLORIDE 25 MG/ML
25 INJECTION INTRAMUSCULAR; INTRAVENOUS; SUBCUTANEOUS EVERY 30 MIN PRN
Status: CANCELLED | OUTPATIENT
Start: 2022-11-14

## 2022-11-14 RX ORDER — HEPARIN SODIUM (PORCINE) LOCK FLUSH IV SOLN 100 UNIT/ML 100 UNIT/ML
5 SOLUTION INTRAVENOUS
Status: CANCELLED | OUTPATIENT
Start: 2022-11-14

## 2022-11-14 RX ADMIN — ACETAMINOPHEN 650 MG: 325 TABLET, FILM COATED ORAL at 09:21

## 2022-11-14 RX ADMIN — DIPHENHYDRAMINE HYDROCHLORIDE 50 MG: 25 CAPSULE ORAL at 09:21

## 2022-11-14 RX ADMIN — DEXAMETHASONE 20 MG: 4 TABLET ORAL at 09:22

## 2022-11-14 RX ADMIN — MONTELUKAST 10 MG: 10 TABLET, FILM COATED ORAL at 09:23

## 2022-11-14 RX ADMIN — DARATUMUMAB AND HYALURONIDASE-FIHJ (HUMAN RECOMBINANT) 1800 MG: 1800; 30000 INJECTION SUBCUTANEOUS at 10:25

## 2022-11-14 ASSESSMENT — PAIN SCALES - GENERAL: PAINLEVEL: NO PAIN (0)

## 2022-11-14 NOTE — PROGRESS NOTES
"Oncology Rooming Note    November 14, 2022 8:17 AM   Quiana Dunaway is a 87 year old female who presents for:    Chief Complaint   Patient presents with     Oncology Clinic Visit     Initial Vitals: There were no vitals taken for this visit. Estimated body mass index is 23.17 kg/m  as calculated from the following:    Height as of 11/5/22: 1.626 m (5' 4\").    Weight as of 11/5/22: 61.2 kg (135 lb). There is no height or weight on file to calculate BSA.  Data Unavailable Comment: Data Unavailable   No LMP recorded. Patient is postmenopausal.  Allergies reviewed: Yes  Medications reviewed: Yes    Medications: Medication refills not needed today.  Pharmacy name entered into [facility]:    [facility] - [place], [place] - [address]  [id number] - [place], [place] - [address] AT [facility]    Clinical concerns:  pa was notified.      Mariela Lyn CMA            "

## 2022-11-14 NOTE — LETTER
"    11/14/2022         RE: Quiana Dunaway  [address] [facility] [address]        Dear Colleague,    Thank you for referring your patient, Quiana Dunaway, to the [facility]CER [facility]. Please see a copy of my visit note below.    Oncology Rooming Note    November 14, 2022 8:17 AM   Quiana Dunaway is a 87 year old female who presents for:    Chief Complaint   Patient presents with     Oncology Clinic Visit     Initial Vitals: There were no vitals taken for this visit. Estimated body mass index is 23.17 kg/m  as calculated from the following:    Height as of 11/5/22: 1.626 m (5' 4\").    Weight as of 11/5/22: 61.2 kg (135 lb). There is no height or weight on file to calculate BSA.  Data Unavailable Comment: Data Unavailable   No LMP recorded. Patient is postmenopausal.  Allergies reviewed: Yes  Medications reviewed: Yes    Medications: Medication refills not needed today.  Pharmacy name entered into [facility]:    [facility] - [place], [place] - [address]  [id number] - [place], [place] - [address] AT [facility]    Clinical concerns:  pa was notified.      Mariela Lyn, [facility]              Oncology/Hematology Visit Note  Nov 14, 2022    Reason for Visit: Follow up of multiple myeloma    Primary Oncologist: Dr. Parry  Oncologic History:  Mrs. Dunaway is a lady with multiple myeloma (IgG kappa), high risk, t(14;16)  1. On 05/01/2017, WBC of 3.4, hemoglobin of 10.2 and platelet of 154.  2. SPEP on 07/07/2017 revealed M-spike of 1.8.  3. Bone marrow biopsy on 07/12/2017 reveals kappa monotypic plasma cell population consistent with multiple myeloma.  Bone marrow is 70% cellular.  Plasma cell is 50-60%.     -There is gain of chromosome 1, 5, 9, 15, and 17.  There is translocation 14;16.   4.  On 07/18/2017:  -M-spike of 1.9.   -Immunofixation reveals monoclonal IgG kappa.    -IgG level of 2970.  IgA of 15 and IgM of 175. "    -Kappa free light chain of 67.7.  Lambda free light chain of 1.42.  Ratio of kappa to lambda of 47.71.    -Beta 2 microglobulin of 3.4.   5. PET scan on 07/24/2017 reveals several focal areas of increased FDG uptake consistent with multiple myeloma.  There is probable epithelial cyst in tail of the pancreas.  No associated abnormal FDG activity.  Left thyroid cysts or nodules without any FDG activity.  There is a 0.3 cm left upper lobe lung nodule.   6.  Velcade, Revlimid and dexamethasone between on 08/14/2017 and 04/30/2018. Velcade stopped.   -Revlimid and dexamethasone continued.  -Revlimid held between 12/27/2021 and 03/08/2022.  7. CT chest, abdomen, and pelvis on 12/30/2021 does not reveal any malignancy.  8. 10/26/22 k FLC 67.3, ratio 79.18. Plan to add juan  9. COVID infection 10/30/22, chemo held  10. 11/14/22 juan started, dex continued, Revlimid held for cytopenias and recent infection    Interval History:  Chemo has been on hold for the last few weeks due to recent COVID infection followed by pneumonia. She went to the ED twice but did not need to be hospitalized. Since the most recent evaluation, she completed antibiotics. She is feeling significantly improved but does have 5lb weight loss and ongoing fatigue. No residual fever or respiratory symptoms. She did take her dex yesterday.     Review of Systems:  A complete review of systems was negative except as noted in the HPI.     Past medical, Surgical, and social history:  Reviewed    Physical Examination:  /77   Pulse 72   Temp 98.2  F (36.8  C) (Oral)   Resp 16   Wt 58.9 kg (129 lb 12.8 oz)   SpO2 97%   BMI 22.28 kg/m    Wt Readings from Last 10 Encounters:   11/14/22 58.9 kg (129 lb 12.8 oz)   11/05/22 61.2 kg (135 lb)   10/30/22 61.2 kg (135 lb)   10/26/22 60.9 kg (134 lb 3.2 oz)   10/06/22 60.8 kg (134 lb)   09/19/22 59.6 kg (131 lb 4.8 oz)   08/17/22 61.6 kg (135 lb 12.8 oz)   07/19/22 62.1 kg (137 lb)   07/13/22 61.2 kg (135 lb)    06/28/22 61.2 kg (135 lb)     General: Elderly patient in no acute distress.   Skin: Warm and dry. No abnormal ecchymosis or rashes.  Eyes: Anicteric.   ENT: Poor dentition.  Heart: Difficult to assess due to winter clothes, however regular on brief auscultation.  Lungs: Clear to auscultation in all fields with no adventitious lung sounds. Normal work of breathing.  Abdomen: Soft, non-tender, non-distended without hepatosplenomegaly. Normoactive bowel sounds.  Extremities: No peripheral edema. Ambulates with some difficulty.   Mental: Calm, cooperative, appropriate. Mood euthymic.  Neuro: Cranial nerves and coordination grossly intact. Alert and oriented x 3.  Laboratory Data:  CBC, CMP reviewed.       Assessment and Plan:  Quiana Dunaway is a 87 year old year old female with high risk multiple myeloma.     # Multiple Myleoma  - Discussed with Dr. Prary  - Initiate Faspro subcutaneous today, every weeky x 2 months then every 2 weeks   - Revlimid on hold since 10/31 due to COVID pneumonia, continue to hold with residual fatigue and ANC 1.0; will repeat labs 11/21 and consider resuming at that time, previously on 2 weeks on then 1 week off cycle, continue ASA 81 mg  - Continue weekly dex 4 mg PO on Sundays  - Continue acyclovir  - Follow-up with Dr. Parry 11/21    # Bone Health  - Initiate Zometa once dental work complete; has completed 2 of 3 needed extractions as of today    # Insomnia  # Decreased Appetite  # Fatigue  - Increase Remeron to 15 mg nightly due to recent weight loss and acute illness, assess each visit    # COVID Pneumonia  - Tested + 10/30, re-evaluated in ED 11/5 with concern for pneumonia, status post paxlovid and doxycycline, significantly improved and no further respiratory symptoms or fever  - Due for repeat Evusheld, consider 2-3 months after COVID infection    35 minutes spent on the date of the encounter doing chart review, review of test results, interpretation of tests, patient visit,  documentation and discussion with other provider(s)     Vivian Macedo PA-C  Department of Hematology and Oncology  [facility]         Again, thank you for allowing me to participate in the care of your patient.        Sincerely,        VIVIAN MACEDO PA-C

## 2022-11-14 NOTE — PROGRESS NOTES
Oncology/Hematology Visit Note  Nov 14, 2022    Reason for Visit: Follow up of multiple myeloma    Primary Oncologist: Dr. Parry  Oncologic History:  Mrs. Dunaway is a lady with multiple myeloma (IgG kappa), high risk, t(14;16)  1. On 05/01/2017, WBC of 3.4, hemoglobin of 10.2 and platelet of 154.  2. SPEP on 07/07/2017 revealed M-spike of 1.8.  3. Bone marrow biopsy on 07/12/2017 reveals kappa monotypic plasma cell population consistent with multiple myeloma.  Bone marrow is 70% cellular.  Plasma cell is 50-60%.     -There is gain of chromosome 1, 5, 9, 15, and 17.  There is translocation 14;16.   4.  On 07/18/2017:  -M-spike of 1.9.   -Immunofixation reveals monoclonal IgG kappa.    -IgG level of 2970.  IgA of 15 and IgM of 175.    -Kappa free light chain of 67.7.  Lambda free light chain of 1.42.  Ratio of kappa to lambda of 47.71.    -Beta 2 microglobulin of 3.4.   5. PET scan on 07/24/2017 reveals several focal areas of increased FDG uptake consistent with multiple myeloma.  There is probable epithelial cyst in tail of the pancreas.  No associated abnormal FDG activity.  Left thyroid cysts or nodules without any FDG activity.  There is a 0.3 cm left upper lobe lung nodule.   6.  Velcade, Revlimid and dexamethasone between on 08/14/2017 and 04/30/2018. Velcade stopped.   -Revlimid and dexamethasone continued.  -Revlimid held between 12/27/2021 and 03/08/2022.  7. CT chest, abdomen, and pelvis on 12/30/2021 does not reveal any malignancy.  8. 10/26/22 k FLC 67.3, ratio 79.18. Plan to add juan  9. COVID infection 10/30/22, chemo held  10. 11/14/22 juan started, dex continued, Revlimid held for cytopenias and recent infection    Interval History:  Chemo has been on hold for the last few weeks due to recent COVID infection followed by pneumonia. She went to the ED twice but did not need to be hospitalized. Since the most recent evaluation, she completed antibiotics. She is feeling significantly improved but does  have 5lb weight loss and ongoing fatigue. No residual fever or respiratory symptoms. She did take her dex yesterday.     Review of Systems:  A complete review of systems was negative except as noted in the HPI.     Past medical, Surgical, and social history:  Reviewed    Physical Examination:  /77   Pulse 72   Temp 98.2  F (36.8  C) (Oral)   Resp 16   Wt 58.9 kg (129 lb 12.8 oz)   SpO2 97%   BMI 22.28 kg/m    Wt Readings from Last 10 Encounters:   11/14/22 58.9 kg (129 lb 12.8 oz)   11/05/22 61.2 kg (135 lb)   10/30/22 61.2 kg (135 lb)   10/26/22 60.9 kg (134 lb 3.2 oz)   10/06/22 60.8 kg (134 lb)   09/19/22 59.6 kg (131 lb 4.8 oz)   08/17/22 61.6 kg (135 lb 12.8 oz)   07/19/22 62.1 kg (137 lb)   07/13/22 61.2 kg (135 lb)   06/28/22 61.2 kg (135 lb)     General: Elderly patient in no acute distress.   Skin: Warm and dry. No abnormal ecchymosis or rashes.  Eyes: Anicteric.   ENT: Poor dentition.  Heart: Difficult to assess due to winter clothes, however regular on brief auscultation.  Lungs: Clear to auscultation in all fields with no adventitious lung sounds. Normal work of breathing.  Abdomen: Soft, non-tender, non-distended without hepatosplenomegaly. Normoactive bowel sounds.  Extremities: No peripheral edema. Ambulates with some difficulty.   Mental: Calm, cooperative, appropriate. Mood euthymic.  Neuro: Cranial nerves and coordination grossly intact. Alert and oriented x 3.  Laboratory Data:  CBC, CMP reviewed.       Assessment and Plan:  Quiana Dunaway is a 87 year old year old female with high risk multiple myeloma.     # Multiple Myleoma  - Discussed with Dr. Parry  - Initiate Faspro subcutaneous today, every weeky x 2 months then every 2 weeks   - Revlimid on hold since 10/31 due to COVID pneumonia, continue to hold with residual fatigue and ANC 1.0; will repeat labs 11/21 and consider resuming at that time, previously on 2 weeks on then 1 week off cycle, continue ASA 81 mg  - Continue weekly  dex 4 mg PO on Sundays  - Continue acyclovir  - Follow-up with Dr. Parry 11/21    # Bone Health  - Initiate Zometa once dental work complete; has completed 2 of 3 needed extractions as of today    # Insomnia  # Decreased Appetite  # Fatigue  - Increase Remeron to 15 mg nightly due to recent weight loss and acute illness, assess each visit    # COVID Pneumonia  - Tested + 10/30, re-evaluated in ED 11/5 with concern for pneumonia, status post paxlovid and doxycycline, significantly improved and no further respiratory symptoms or fever  - Due for repeat Evusheld, consider 2-3 months after COVID infection    35 minutes spent on the date of the encounter doing chart review, review of test results, interpretation of tests, patient visit, documentation and discussion with other provider(s)     Vivian Macedo PA-C  Department of Hematology and Oncology  [facility]

## 2022-11-14 NOTE — PROGRESS NOTES
Infusion Nursing Note:  Quiana Dunaway presents today for Cycle 1 Day 1 Darzalex FasPro.    Patient seen by provider today: Yes: ADELSO Bragg   present during visit today: Not Applicable.    Note: Per Dr. Parry, ok to proceed with treatment today with ANC of 1.0. Pt should hold Revelimid.    Of note, patient has difficulty swallowing pills and needs applesauce to take them. This RN was able to have some applesauce tubed over from [facility] and also instructed patient to bring applesauce from home supply to her next appointment.     Intravenous Access:  Peripheral IV placed.    Treatment Conditions:  Lab Results   Component Value Date    HGB 9.6 (L) 11/11/2022    WBC 3.4 (L) 11/11/2022    ANEU 4.1 11/05/2022    ANEUTAUTO 1.0 (L) 11/11/2022     11/11/2022        Post Infusion Assessment:  Patient tolerated injection without incident.  Patient observed for 3.5 hours post Darzalex FasPro per protocol.  Blood return noted pre and post infusion.  Site patent and intact, free from redness, edema or discomfort.  No evidence of extravasations.  Access discontinued per protocol.     Discharge Plan:   Patient declined prescription refills.  Discharge instructions reviewed with: Patient.  Patient and family verbalized understanding of discharge instructions and all questions answered.  AVS to patient via [employer].  Patient will return 11/21/22 for next appointment.   Patient discharged in stable condition accompanied by: self and daughter-in-law.  Departure Mode: Ambulatory with cane.      Isabel Bethea RN

## 2022-11-15 LAB
ALBUMIN SERPL ELPH-MCNC: 3.3 G/DL (ref 3.7–5.1)
ALPHA1 GLOB SERPL ELPH-MCNC: 0.4 G/DL (ref 0.2–0.4)
ALPHA2 GLOB SERPL ELPH-MCNC: 0.7 G/DL (ref 0.5–0.9)
B-GLOBULIN SERPL ELPH-MCNC: 0.5 G/DL (ref 0.6–1)
GAMMA GLOB SERPL ELPH-MCNC: 1.1 G/DL (ref 0.7–1.6)
IGG SERPL-MCNC: 1338 MG/DL (ref 610–1616)
KAPPA LC FREE SER-MCNC: 93.25 MG/DL (ref 0.33–1.94)
KAPPA LC FREE/LAMBDA FREE SER NEPH: 233.13 {RATIO} (ref 0.26–1.65)
LAMBDA LC FREE SERPL-MCNC: 0.4 MG/DL (ref 0.57–2.63)
M PROTEIN SERPL ELPH-MCNC: 0.9 G/DL
PROT PATTERN SERPL ELPH-IMP: ABNORMAL

## 2022-11-15 PROCEDURE — 84165 PROTEIN E-PHORESIS SERUM: CPT | Mod: 26

## 2022-11-18 LAB — SCANNED LAB RESULT: NORMAL

## 2022-11-21 ENCOUNTER — LAB (OUTPATIENT)
Dept: INFUSION THERAPY | Facility: CLINIC | Age: 87
End: 2022-11-21
Attending: INTERNAL MEDICINE
Payer: MEDICARE

## 2022-11-21 ENCOUNTER — ONCOLOGY VISIT (OUTPATIENT)
Dept: ONCOLOGY | Facility: CLINIC | Age: 87
End: 2022-11-21
Attending: INTERNAL MEDICINE
Payer: MEDICARE

## 2022-11-21 VITALS
SYSTOLIC BLOOD PRESSURE: 114 MMHG | WEIGHT: 130 LBS | BODY MASS INDEX: 22.31 KG/M2 | OXYGEN SATURATION: 97 % | HEART RATE: 79 BPM | DIASTOLIC BLOOD PRESSURE: 71 MMHG | RESPIRATION RATE: 16 BRPM

## 2022-11-21 VITALS — WEIGHT: 130 LBS | BODY MASS INDEX: 23.92 KG/M2 | HEIGHT: 62 IN | RESPIRATION RATE: 16 BRPM

## 2022-11-21 DIAGNOSIS — C90.00 MULTIPLE MYELOMA NOT HAVING ACHIEVED REMISSION (H): Primary | ICD-10-CM

## 2022-11-21 DIAGNOSIS — C90.00 MULTIPLE MYELOMA NOT HAVING ACHIEVED REMISSION (H): ICD-10-CM

## 2022-11-21 DIAGNOSIS — R63.4 ABNORMAL LOSS OF WEIGHT: ICD-10-CM

## 2022-11-21 LAB
ALBUMIN SERPL-MCNC: 2.8 G/DL (ref 3.4–5)
ALP SERPL-CCNC: 83 U/L (ref 40–150)
ALT SERPL W P-5'-P-CCNC: 20 U/L (ref 0–50)
ANION GAP SERPL CALCULATED.3IONS-SCNC: 4 MMOL/L (ref 3–14)
AST SERPL W P-5'-P-CCNC: 16 U/L (ref 0–45)
BASOPHILS # BLD AUTO: 0 10E3/UL (ref 0–0.2)
BASOPHILS NFR BLD AUTO: 0 %
BILIRUB SERPL-MCNC: 0.2 MG/DL (ref 0.2–1.3)
BUN SERPL-MCNC: 26 MG/DL (ref 7–30)
CALCIUM SERPL-MCNC: 8.8 MG/DL (ref 8.5–10.1)
CHLORIDE BLD-SCNC: 111 MMOL/L (ref 94–109)
CO2 SERPL-SCNC: 26 MMOL/L (ref 20–32)
CREAT SERPL-MCNC: 0.73 MG/DL (ref 0.52–1.04)
EOSINOPHIL # BLD AUTO: 0 10E3/UL (ref 0–0.7)
EOSINOPHIL NFR BLD AUTO: 0 %
ERYTHROCYTE [DISTWIDTH] IN BLOOD BY AUTOMATED COUNT: 16.2 % (ref 10–15)
GFR SERPL CREATININE-BSD FRML MDRD: 80 ML/MIN/1.73M2
GLUCOSE BLD-MCNC: 114 MG/DL (ref 70–99)
HCT VFR BLD AUTO: 28.2 % (ref 35–47)
HGB BLD-MCNC: 9.2 G/DL (ref 11.7–15.7)
IMM GRANULOCYTES # BLD: 0 10E3/UL
IMM GRANULOCYTES NFR BLD: 1 %
LYMPHOCYTES # BLD AUTO: 1.2 10E3/UL (ref 0.8–5.3)
LYMPHOCYTES NFR BLD AUTO: 22 %
MCH RBC QN AUTO: 30.7 PG (ref 26.5–33)
MCHC RBC AUTO-ENTMCNC: 32.6 G/DL (ref 31.5–36.5)
MCV RBC AUTO: 94 FL (ref 78–100)
MONOCYTES # BLD AUTO: 0.5 10E3/UL (ref 0–1.3)
MONOCYTES NFR BLD AUTO: 9 %
NEUTROPHILS # BLD AUTO: 3.6 10E3/UL (ref 1.6–8.3)
NEUTROPHILS NFR BLD AUTO: 68 %
NRBC # BLD AUTO: 0 10E3/UL
NRBC BLD AUTO-RTO: 0 /100
PLATELET # BLD AUTO: 157 10E3/UL (ref 150–450)
POTASSIUM BLD-SCNC: 3.6 MMOL/L (ref 3.4–5.3)
PROT SERPL-MCNC: 6.5 G/DL (ref 6.8–8.8)
RBC # BLD AUTO: 3 10E6/UL (ref 3.8–5.2)
SODIUM SERPL-SCNC: 141 MMOL/L (ref 133–144)
WBC # BLD AUTO: 5.3 10E3/UL (ref 4–11)

## 2022-11-21 PROCEDURE — 250N000011 HC RX IP 250 OP 636: Performed by: INTERNAL MEDICINE

## 2022-11-21 PROCEDURE — 96401 CHEMO ANTI-NEOPL SQ/IM: CPT

## 2022-11-21 PROCEDURE — 80053 COMPREHEN METABOLIC PANEL: CPT | Performed by: INTERNAL MEDICINE

## 2022-11-21 PROCEDURE — 82040 ASSAY OF SERUM ALBUMIN: CPT | Performed by: INTERNAL MEDICINE

## 2022-11-21 PROCEDURE — G0463 HOSPITAL OUTPT CLINIC VISIT: HCPCS

## 2022-11-21 PROCEDURE — 99214 OFFICE O/P EST MOD 30 MIN: CPT | Performed by: INTERNAL MEDICINE

## 2022-11-21 PROCEDURE — 36415 COLL VENOUS BLD VENIPUNCTURE: CPT

## 2022-11-21 PROCEDURE — 85025 COMPLETE CBC W/AUTO DIFF WBC: CPT | Performed by: INTERNAL MEDICINE

## 2022-11-21 PROCEDURE — 250N000013 HC RX MED GY IP 250 OP 250 PS 637: Performed by: INTERNAL MEDICINE

## 2022-11-21 RX ORDER — MEPERIDINE HYDROCHLORIDE 25 MG/ML
25 INJECTION INTRAMUSCULAR; INTRAVENOUS; SUBCUTANEOUS EVERY 30 MIN PRN
Status: CANCELLED | OUTPATIENT
Start: 2023-01-02

## 2022-11-21 RX ORDER — HEPARIN SODIUM,PORCINE 10 UNIT/ML
5 VIAL (ML) INTRAVENOUS
Status: CANCELLED | OUTPATIENT
Start: 2023-01-02

## 2022-11-21 RX ORDER — ALBUTEROL SULFATE 90 UG/1
1-2 AEROSOL, METERED RESPIRATORY (INHALATION)
Status: CANCELLED
Start: 2023-01-02

## 2022-11-21 RX ORDER — LORAZEPAM 2 MG/ML
0.5 INJECTION INTRAMUSCULAR EVERY 4 HOURS PRN
Status: CANCELLED | OUTPATIENT
Start: 2022-12-19

## 2022-11-21 RX ORDER — MEPERIDINE HYDROCHLORIDE 25 MG/ML
25 INJECTION INTRAMUSCULAR; INTRAVENOUS; SUBCUTANEOUS EVERY 30 MIN PRN
Status: CANCELLED | OUTPATIENT
Start: 2022-12-19

## 2022-11-21 RX ORDER — HEPARIN SODIUM (PORCINE) LOCK FLUSH IV SOLN 100 UNIT/ML 100 UNIT/ML
5 SOLUTION INTRAVENOUS
Status: CANCELLED | OUTPATIENT
Start: 2023-01-09

## 2022-11-21 RX ORDER — ACETAMINOPHEN 325 MG/1
650 TABLET ORAL ONCE
Status: COMPLETED | OUTPATIENT
Start: 2022-11-21 | End: 2022-11-21

## 2022-11-21 RX ORDER — ALBUTEROL SULFATE 0.83 MG/ML
2.5 SOLUTION RESPIRATORY (INHALATION)
Status: CANCELLED | OUTPATIENT
Start: 2023-01-09

## 2022-11-21 RX ORDER — DEXAMETHASONE 4 MG/1
12 TABLET ORAL
Status: CANCELLED | OUTPATIENT
Start: 2023-01-09

## 2022-11-21 RX ORDER — EPINEPHRINE 1 MG/ML
0.3 INJECTION, SOLUTION INTRAMUSCULAR; SUBCUTANEOUS EVERY 5 MIN PRN
Status: CANCELLED | OUTPATIENT
Start: 2023-01-09

## 2022-11-21 RX ORDER — DIPHENHYDRAMINE HYDROCHLORIDE 50 MG/ML
50 INJECTION INTRAMUSCULAR; INTRAVENOUS
Status: CANCELLED
Start: 2022-12-19

## 2022-11-21 RX ORDER — DIPHENHYDRAMINE HYDROCHLORIDE 50 MG/ML
50 INJECTION INTRAMUSCULAR; INTRAVENOUS
Status: CANCELLED
Start: 2023-01-16

## 2022-11-21 RX ORDER — HEPARIN SODIUM (PORCINE) LOCK FLUSH IV SOLN 100 UNIT/ML 100 UNIT/ML
5 SOLUTION INTRAVENOUS
Status: CANCELLED | OUTPATIENT
Start: 2022-12-19

## 2022-11-21 RX ORDER — LORAZEPAM 2 MG/ML
0.5 INJECTION INTRAMUSCULAR EVERY 4 HOURS PRN
Status: CANCELLED | OUTPATIENT
Start: 2023-01-16

## 2022-11-21 RX ORDER — HEPARIN SODIUM,PORCINE 10 UNIT/ML
5 VIAL (ML) INTRAVENOUS
Status: CANCELLED | OUTPATIENT
Start: 2023-01-16

## 2022-11-21 RX ORDER — DIPHENHYDRAMINE HYDROCHLORIDE 50 MG/ML
50 INJECTION INTRAMUSCULAR; INTRAVENOUS
Status: CANCELLED
Start: 2023-01-02

## 2022-11-21 RX ORDER — HEPARIN SODIUM (PORCINE) LOCK FLUSH IV SOLN 100 UNIT/ML 100 UNIT/ML
5 SOLUTION INTRAVENOUS
Status: CANCELLED | OUTPATIENT
Start: 2023-01-02

## 2022-11-21 RX ORDER — LORAZEPAM 2 MG/ML
0.5 INJECTION INTRAMUSCULAR EVERY 4 HOURS PRN
Status: CANCELLED | OUTPATIENT
Start: 2023-01-09

## 2022-11-21 RX ORDER — ALBUTEROL SULFATE 90 UG/1
1-2 AEROSOL, METERED RESPIRATORY (INHALATION)
Status: CANCELLED
Start: 2023-01-09

## 2022-11-21 RX ORDER — EPINEPHRINE 1 MG/ML
0.3 INJECTION, SOLUTION INTRAMUSCULAR; SUBCUTANEOUS EVERY 5 MIN PRN
Status: CANCELLED | OUTPATIENT
Start: 2023-01-02

## 2022-11-21 RX ORDER — ACETAMINOPHEN 325 MG/1
650 TABLET ORAL
Status: CANCELLED | OUTPATIENT
Start: 2023-01-02

## 2022-11-21 RX ORDER — ALBUTEROL SULFATE 0.83 MG/ML
2.5 SOLUTION RESPIRATORY (INHALATION)
Status: CANCELLED | OUTPATIENT
Start: 2023-01-02

## 2022-11-21 RX ORDER — MIRTAZAPINE 7.5 MG/1
15 TABLET, FILM COATED ORAL AT BEDTIME
Qty: 90 TABLET | Refills: 3 | COMMUNITY
Start: 2022-11-21 | End: 2023-02-22

## 2022-11-21 RX ORDER — EPINEPHRINE 1 MG/ML
0.3 INJECTION, SOLUTION INTRAMUSCULAR; SUBCUTANEOUS EVERY 5 MIN PRN
Status: CANCELLED | OUTPATIENT
Start: 2023-01-16

## 2022-11-21 RX ORDER — DIPHENHYDRAMINE HCL 25 MG
50 CAPSULE ORAL
Status: CANCELLED | OUTPATIENT
Start: 2023-01-09

## 2022-11-21 RX ORDER — ACETAMINOPHEN 325 MG/1
650 TABLET ORAL
Status: CANCELLED | OUTPATIENT
Start: 2022-12-19

## 2022-11-21 RX ORDER — DEXAMETHASONE 4 MG/1
20 TABLET ORAL ONCE
Status: COMPLETED | OUTPATIENT
Start: 2022-11-21 | End: 2022-11-21

## 2022-11-21 RX ORDER — DIPHENHYDRAMINE HCL 25 MG
50 CAPSULE ORAL
Status: CANCELLED | OUTPATIENT
Start: 2023-01-02

## 2022-11-21 RX ORDER — DIPHENHYDRAMINE HCL 25 MG
50 CAPSULE ORAL
Status: CANCELLED | OUTPATIENT
Start: 2023-01-16

## 2022-11-21 RX ORDER — DIPHENHYDRAMINE HCL 25 MG
50 CAPSULE ORAL
Status: CANCELLED | OUTPATIENT
Start: 2022-12-19

## 2022-11-21 RX ORDER — ACETAMINOPHEN 325 MG/1
650 TABLET ORAL
Status: CANCELLED | OUTPATIENT
Start: 2023-01-16

## 2022-11-21 RX ORDER — HEPARIN SODIUM (PORCINE) LOCK FLUSH IV SOLN 100 UNIT/ML 100 UNIT/ML
5 SOLUTION INTRAVENOUS
Status: CANCELLED | OUTPATIENT
Start: 2023-01-16

## 2022-11-21 RX ORDER — MONTELUKAST SODIUM 10 MG/1
10 TABLET ORAL ONCE
Status: COMPLETED | OUTPATIENT
Start: 2022-11-21 | End: 2022-11-21

## 2022-11-21 RX ORDER — DEXAMETHASONE 4 MG/1
12 TABLET ORAL
Status: CANCELLED | OUTPATIENT
Start: 2023-01-16

## 2022-11-21 RX ORDER — ALBUTEROL SULFATE 90 UG/1
1-2 AEROSOL, METERED RESPIRATORY (INHALATION)
Status: CANCELLED
Start: 2023-01-16

## 2022-11-21 RX ORDER — LORAZEPAM 2 MG/ML
0.5 INJECTION INTRAMUSCULAR EVERY 4 HOURS PRN
Status: CANCELLED | OUTPATIENT
Start: 2023-01-02

## 2022-11-21 RX ORDER — DIPHENHYDRAMINE HCL 25 MG
50 CAPSULE ORAL ONCE
Status: COMPLETED | OUTPATIENT
Start: 2022-11-21 | End: 2022-11-21

## 2022-11-21 RX ORDER — DEXAMETHASONE 4 MG/1
12 TABLET ORAL
Status: CANCELLED | OUTPATIENT
Start: 2022-12-19

## 2022-11-21 RX ORDER — ALBUTEROL SULFATE 90 UG/1
1-2 AEROSOL, METERED RESPIRATORY (INHALATION)
Status: CANCELLED
Start: 2022-12-19

## 2022-11-21 RX ORDER — MEPERIDINE HYDROCHLORIDE 25 MG/ML
25 INJECTION INTRAMUSCULAR; INTRAVENOUS; SUBCUTANEOUS EVERY 30 MIN PRN
Status: CANCELLED | OUTPATIENT
Start: 2023-01-09

## 2022-11-21 RX ORDER — HEPARIN SODIUM,PORCINE 10 UNIT/ML
5 VIAL (ML) INTRAVENOUS
Status: CANCELLED | OUTPATIENT
Start: 2023-01-09

## 2022-11-21 RX ORDER — METHYLPREDNISOLONE SODIUM SUCCINATE 125 MG/2ML
125 INJECTION, POWDER, LYOPHILIZED, FOR SOLUTION INTRAMUSCULAR; INTRAVENOUS
Status: CANCELLED
Start: 2023-01-02

## 2022-11-21 RX ORDER — METHYLPREDNISOLONE SODIUM SUCCINATE 125 MG/2ML
125 INJECTION, POWDER, LYOPHILIZED, FOR SOLUTION INTRAMUSCULAR; INTRAVENOUS
Status: CANCELLED
Start: 2023-01-09

## 2022-11-21 RX ORDER — ALBUTEROL SULFATE 0.83 MG/ML
2.5 SOLUTION RESPIRATORY (INHALATION)
Status: CANCELLED | OUTPATIENT
Start: 2023-01-16

## 2022-11-21 RX ORDER — HEPARIN SODIUM,PORCINE 10 UNIT/ML
5 VIAL (ML) INTRAVENOUS
Status: CANCELLED | OUTPATIENT
Start: 2022-12-19

## 2022-11-21 RX ORDER — MEPERIDINE HYDROCHLORIDE 25 MG/ML
25 INJECTION INTRAMUSCULAR; INTRAVENOUS; SUBCUTANEOUS EVERY 30 MIN PRN
Status: CANCELLED | OUTPATIENT
Start: 2023-01-16

## 2022-11-21 RX ORDER — EPINEPHRINE 1 MG/ML
0.3 INJECTION, SOLUTION INTRAMUSCULAR; SUBCUTANEOUS EVERY 5 MIN PRN
Status: CANCELLED | OUTPATIENT
Start: 2022-12-19

## 2022-11-21 RX ORDER — ACETAMINOPHEN 325 MG/1
650 TABLET ORAL
Status: CANCELLED | OUTPATIENT
Start: 2023-01-09

## 2022-11-21 RX ORDER — DEXAMETHASONE 4 MG/1
12 TABLET ORAL
Status: CANCELLED | OUTPATIENT
Start: 2023-01-02

## 2022-11-21 RX ORDER — METHYLPREDNISOLONE SODIUM SUCCINATE 125 MG/2ML
125 INJECTION, POWDER, LYOPHILIZED, FOR SOLUTION INTRAMUSCULAR; INTRAVENOUS
Status: CANCELLED
Start: 2023-01-16

## 2022-11-21 RX ORDER — DIPHENHYDRAMINE HYDROCHLORIDE 50 MG/ML
50 INJECTION INTRAMUSCULAR; INTRAVENOUS
Status: CANCELLED
Start: 2023-01-09

## 2022-11-21 RX ORDER — METHYLPREDNISOLONE SODIUM SUCCINATE 125 MG/2ML
125 INJECTION, POWDER, LYOPHILIZED, FOR SOLUTION INTRAMUSCULAR; INTRAVENOUS
Status: CANCELLED
Start: 2022-12-19

## 2022-11-21 RX ORDER — ALBUTEROL SULFATE 0.83 MG/ML
2.5 SOLUTION RESPIRATORY (INHALATION)
Status: CANCELLED | OUTPATIENT
Start: 2022-12-19

## 2022-11-21 RX ADMIN — MONTELUKAST 10 MG: 10 TABLET, FILM COATED ORAL at 09:08

## 2022-11-21 RX ADMIN — DIPHENHYDRAMINE HYDROCHLORIDE 50 MG: 25 CAPSULE ORAL at 09:02

## 2022-11-21 RX ADMIN — DEXAMETHASONE 20 MG: 4 TABLET ORAL at 09:08

## 2022-11-21 RX ADMIN — DARATUMUMAB AND HYALURONIDASE-FIHJ (HUMAN RECOMBINANT) 1800 MG: 1800; 30000 INJECTION SUBCUTANEOUS at 09:44

## 2022-11-21 RX ADMIN — ACETAMINOPHEN 650 MG: 325 TABLET, FILM COATED ORAL at 09:02

## 2022-11-21 ASSESSMENT — PAIN SCALES - GENERAL: PAINLEVEL: NO PAIN (0)

## 2022-11-21 NOTE — PROGRESS NOTES
HEMATOLOGY HISTORY: Mrs. Dunaway is a lady with multiple myeloma (IgG kappa). High risk, t(14;16).  1. On 05/01/2017, WBC of 3.4, hemoglobin of 10.2 and platelet of 154.  2. SPEP on 07/07/2017 revealed M-spike of 1.8.  3. Bone marrow biopsy on 07/12/2017 reveals kappa monotypic plasma cell population consistent with multiple myeloma.  Bone marrow is 70% cellular.  Plasma cell is 50-60%.     -There is gain of chromosome 1, 5, 9, 15, and 17.  There is translocation 14;16.   4.  On 07/18/2017:  -M-spike of 1.9.   -Immunofixation reveals monoclonal IgG kappa.    -IgG level of 2970.  IgA of 15 and IgM of 175.    -Kappa free light chain of 67.7.  Lambda free light chain of 1.42.  Ratio of kappa to lambda of 47.71.    -Beta 2 microglobulin of 3.4.   5. PET scan on 07/24/2017 reveals several focal areas of increased FDG uptake consistent with multiple myeloma.  There is probable epithelial cyst in tail of the pancreas.  No associated abnormal FDG activity.  Left thyroid cysts or nodules without any FDG activity.  There is a 0.3 cm left upper lobe lung nodule.   6.  Velcade, Revlimid and dexamethasone between on 08/14/2017 and 04/30/2018. Velcade stopped.   -Revlimid and dexamethasone continued.  -Revlimid held between 12/27/2021 and 03/08/2022.  -Daratumumab added on 11/14/2022.  7. CT chest, abdomen, and pelvis on 12/30/2021 does not reveal any malignancy.     SUBJECTIVE:  Ms. Dunaway is an 87-year-old female with multiple myeloma. She was on revlimid and weekly dexamethasone.  Daratumumab added on 11/14/2022.  Zometa is on hold for dental work.    She had COVID-19 infection few weeks ago.  She has recovered from it.    Overall she is doing good.  She has generalized weakness.  No worsening.  She is able do activities of daily living.  No headache.  No dizziness.  No chest pain.  No shortness of breath.  No abdominal pain.  No nausea.  No vomiting.  No urinary complaints.  No bowel problem.  No bleeding.  All other  review of system negative.     PHYSICAL EXAMINATION:   GENERAL:  Alert and oriented x 3.  Not in any distress.  VITAL SIGNS:  Reviewed.       Rest of the system is not examined.     LABORATORY: CBC and CMP reviewed.     ASSESSMENT:    1.  An 87-year-old female with multiple myeloma on Revlimid, daratumumab and dexamethasone.  2.  Normocytic anemia, stable.  3.  Fatigue secondary to her age, myeloma and multiple other medical problem.     PLAN:    1.  Patient overall is doing well from myeloma.    For myeloma, she will continue on daratumumab, Revlimid and dexamethasone.  Revlimid was on hold because of recent COVID-19 infection.  She has recovered from COVID-19.  I advised her to resume Revlimid starting today.    2.  Zometa will be resumed once her dental work-up is complete.    3.  She will continue on acyclovir and aspirin.    4.  I will see her in 4 to 6 weeks time.  In between she will see our nurse practitioner. Advised her to call us with any questions or concerns.     TOTAL VISIT TIME: 30 minutes.  Time spent in today's visit, review of chart/investigations today, monitoring for toxicity of high risk medication and documentation today.

## 2022-11-21 NOTE — PROGRESS NOTES
"Oncology Rooming Note    November 21, 2022 8:20 AM   Quiana Dunaway is a 87 year old female who presents for:    Chief Complaint   Patient presents with     Oncology Clinic Visit     Initial Vitals: There were no vitals taken for this visit. Estimated body mass index is 22.28 kg/m  as calculated from the following:    Height as of 11/5/22: 1.626 m (5' 4\").    Weight as of 11/14/22: 58.9 kg (129 lb 12.8 oz). There is no height or weight on file to calculate BSA.  Data Unavailable Comment: Data Unavailable   No LMP recorded. Patient is postmenopausal.  Allergies reviewed: Yes  Medications reviewed: Yes    Medications: Medication refills not needed today.  Pharmacy name entered into [facility]:    [facility] - [place], [place] - [address]  [id number] - [place], [place] - [address] [facility]    Clinical concerns:  doctor was notified.      Mariela Lyn CMA            "

## 2022-11-21 NOTE — LETTER
"    11/21/2022         RE: Quiana Dunaway  [address] [facility] [address]        Dear Colleague,    Thank you for referring your patient, Quaina Dunaway, to the [facility]. Please see a copy of my visit note below.    Oncology Rooming Note    November 21, 2022 8:20 AM   Quiana Dunaway is a 87 year old female who presents for:    Chief Complaint   Patient presents with     Oncology Clinic Visit     Initial Vitals: There were no vitals taken for this visit. Estimated body mass index is 22.28 kg/m  as calculated from the following:    Height as of 11/5/22: 1.626 m (5' 4\").    Weight as of 11/14/22: 58.9 kg (129 lb 12.8 oz). There is no height or weight on file to calculate BSA.  Data Unavailable Comment: Data Unavailable   No LMP recorded. Patient is postmenopausal.  Allergies reviewed: Yes  Medications reviewed: Yes    Medications: Medication refills not needed today.  Pharmacy name entered into [facility]:    [facility] - [place], [place] - [address]  [id number] - [place], [place] - [address] AT [facility]    Clinical concerns:  doctor was notified.      Mariela Lyn [facility]              HEMATOLOGY HISTORY: Mrs. Dunaway is a lady with multiple myeloma (IgG kappa). High risk, t(14;16).  1. On 05/01/2017, WBC of 3.4, hemoglobin of 10.2 and platelet of 154.  2. SPEP on 07/07/2017 revealed M-spike of 1.8.  3. Bone marrow biopsy on 07/12/2017 reveals kappa monotypic plasma cell population consistent with multiple myeloma.  Bone marrow is 70% cellular.  Plasma cell is 50-60%.     -There is gain of chromosome 1, 5, 9, 15, and 17.  There is translocation 14;16.   4.  On 07/18/2017:  -M-spike of 1.9.   -Immunofixation reveals monoclonal IgG kappa.    -IgG level of 2970.  IgA of 15 and IgM of 175.    -Kappa free light chain of 67.7.  Lambda free light chain of 1.42.  Ratio of kappa to lambda of 47.71.    -Beta 2 " microglobulin of 3.4.   5. PET scan on 07/24/2017 reveals several focal areas of increased FDG uptake consistent with multiple myeloma.  There is probable epithelial cyst in tail of the pancreas.  No associated abnormal FDG activity.  Left thyroid cysts or nodules without any FDG activity.  There is a 0.3 cm left upper lobe lung nodule.   6.  Velcade, Revlimid and dexamethasone between on 08/14/2017 and 04/30/2018. Velcade stopped.   -Revlimid and dexamethasone continued.  -Revlimid held between 12/27/2021 and 03/08/2022.  -Daratumumab added on 11/14/2022.  7. CT chest, abdomen, and pelvis on 12/30/2021 does not reveal any malignancy.     SUBJECTIVE:  Ms. Dunaway is an 87-year-old female with multiple myeloma. She was on revlimid and weekly dexamethasone.  Daratumumab added on 11/14/2022.  Zometa is on hold for dental work.    She had COVID-19 infection few weeks ago.  She has recovered from it.    Overall she is doing good.  She has generalized weakness.  No worsening.  She is able do activities of daily living.  No headache.  No dizziness.  No chest pain.  No shortness of breath.  No abdominal pain.  No nausea.  No vomiting.  No urinary complaints.  No bowel problem.  No bleeding.  All other review of system negative.     PHYSICAL EXAMINATION:   GENERAL:  Alert and oriented x 3.  Not in any distress.  VITAL SIGNS:  Reviewed.       Rest of the system is not examined.     LABORATORY: CBC and CMP reviewed.     ASSESSMENT:    1.  An 87-year-old female with multiple myeloma on Revlimid, daratumumab and dexamethasone.  2.  Normocytic anemia, stable.  3.  Fatigue secondary to her age, myeloma and multiple other medical problem.     PLAN:    1.  Patient overall is doing well from myeloma.    For myeloma, she will continue on daratumumab, Revlimid and dexamethasone.  Revlimid was on hold because of recent COVID-19 infection.  She has recovered from COVID-19.  I advised her to resume Revlimid starting today.    2.  Zometa  will be resumed once her dental work-up is complete.    3.  She will continue on acyclovir and aspirin.    4.  I will see her in 4 to 6 weeks time.  In between she will see our nurse practitioner. Advised her to call us with any questions or concerns.     TOTAL VISIT TIME: 30 minutes.  Time spent in today's visit, review of chart/investigations today, monitoring for toxicity of high risk medication and documentation today.      Again, thank you for allowing me to participate in the care of your patient.        Sincerely,        Alex Parry MD

## 2022-11-21 NOTE — PROGRESS NOTES
Infusion Nursing Note:  Quiana Dunaway presents today for C1D8 darzalex faspro.    Patient seen by provider today: Yes: Dr. Parry   present during visit today: Not Applicable.    Note: Pt reports she is taking her dexamethasone and revlimid as prescribed.    Intravenous Access:  No Intravenous access/labs at this visit.    Treatment Conditions:  Lab Results   Component Value Date    HGB 9.2 (L) 11/21/2022    WBC 5.3 11/21/2022    ANEU 4.1 11/05/2022    ANEUTAUTO 3.6 11/21/2022     11/21/2022      Results reviewed, labs MET treatment parameters, ok to proceed with treatment.    Post Infusion Assessment:  Patient tolerated injection without incident.  Site patent and intact, free from redness, edema or discomfort.  No evidence of extravasations.     Discharge Plan:   Patient declined prescription refills.  Discharge instructions reviewed with: Patient.  Patient and/or family verbalized understanding of discharge instructions and all questions answered.  AVS to patient via [employer].  Patient will return 11/28/22 for next appointment.   Patient discharged in stable condition accompanied by: self and daughter-in-law.  Departure Mode: Ambulatory.      Lalita Palacios RN

## 2022-11-21 NOTE — PROGRESS NOTES
Medical Assistant Note:  Quiana Dunaway presents today for blood draw.    Patient seen by provider today: Yes: nakita.   present during visit today: Not Applicable.    Concerns: No Concerns.    Procedure:  Lab draw site: lac, Needle type: bf, Gauge: 23.    Post Assessment:  Labs drawn without difficulty: Yes.    Discharge Plan:  Departure Mode: Ambulatory.    Face to Face Time: 5 min.    Mariela Lyn, CMA

## 2022-11-28 ENCOUNTER — ONCOLOGY VISIT (OUTPATIENT)
Dept: ONCOLOGY | Facility: CLINIC | Age: 87
End: 2022-11-28
Attending: INTERNAL MEDICINE
Payer: MEDICARE

## 2022-11-28 ENCOUNTER — LAB (OUTPATIENT)
Dept: INFUSION THERAPY | Facility: CLINIC | Age: 87
End: 2022-11-28
Attending: INTERNAL MEDICINE
Payer: MEDICARE

## 2022-11-28 VITALS
RESPIRATION RATE: 16 BRPM | DIASTOLIC BLOOD PRESSURE: 72 MMHG | OXYGEN SATURATION: 95 % | BODY MASS INDEX: 23.96 KG/M2 | WEIGHT: 131 LBS | SYSTOLIC BLOOD PRESSURE: 125 MMHG | TEMPERATURE: 97.7 F | HEART RATE: 73 BPM

## 2022-11-28 VITALS — HEIGHT: 62 IN | BODY MASS INDEX: 23.95 KG/M2

## 2022-11-28 DIAGNOSIS — C90.00 MULTIPLE MYELOMA NOT HAVING ACHIEVED REMISSION (H): Primary | ICD-10-CM

## 2022-11-28 DIAGNOSIS — C90.00 MULTIPLE MYELOMA NOT HAVING ACHIEVED REMISSION (H): ICD-10-CM

## 2022-11-28 LAB
ALBUMIN SERPL-MCNC: 3.1 G/DL (ref 3.4–5)
ALP SERPL-CCNC: 67 U/L (ref 40–150)
ALT SERPL W P-5'-P-CCNC: 17 U/L (ref 0–50)
ANION GAP SERPL CALCULATED.3IONS-SCNC: 9 MMOL/L (ref 3–14)
AST SERPL W P-5'-P-CCNC: 6 U/L (ref 0–45)
BASOPHILS # BLD AUTO: 0 10E3/UL (ref 0–0.2)
BASOPHILS NFR BLD AUTO: 0 %
BILIRUB SERPL-MCNC: 0.4 MG/DL (ref 0.2–1.3)
BUN SERPL-MCNC: 21 MG/DL (ref 7–30)
CALCIUM SERPL-MCNC: 8.4 MG/DL (ref 8.5–10.1)
CHLORIDE BLD-SCNC: 109 MMOL/L (ref 94–109)
CO2 SERPL-SCNC: 21 MMOL/L (ref 20–32)
CREAT SERPL-MCNC: 0.7 MG/DL (ref 0.52–1.04)
EOSINOPHIL # BLD AUTO: 0 10E3/UL (ref 0–0.7)
EOSINOPHIL NFR BLD AUTO: 1 %
ERYTHROCYTE [DISTWIDTH] IN BLOOD BY AUTOMATED COUNT: 16.3 % (ref 10–15)
GFR SERPL CREATININE-BSD FRML MDRD: 84 ML/MIN/1.73M2
GLUCOSE BLD-MCNC: 145 MG/DL (ref 70–99)
HCT VFR BLD AUTO: 27.4 % (ref 35–47)
HGB BLD-MCNC: 9 G/DL (ref 11.7–15.7)
IMM GRANULOCYTES # BLD: 0 10E3/UL
IMM GRANULOCYTES NFR BLD: 1 %
LYMPHOCYTES # BLD AUTO: 0.8 10E3/UL (ref 0.8–5.3)
LYMPHOCYTES NFR BLD AUTO: 22 %
MCH RBC QN AUTO: 30.7 PG (ref 26.5–33)
MCHC RBC AUTO-ENTMCNC: 32.8 G/DL (ref 31.5–36.5)
MCV RBC AUTO: 94 FL (ref 78–100)
MONOCYTES # BLD AUTO: 0.4 10E3/UL (ref 0–1.3)
MONOCYTES NFR BLD AUTO: 10 %
NEUTROPHILS # BLD AUTO: 2.5 10E3/UL (ref 1.6–8.3)
NEUTROPHILS NFR BLD AUTO: 66 %
NRBC # BLD AUTO: 0 10E3/UL
NRBC BLD AUTO-RTO: 0 /100
PLATELET # BLD AUTO: 130 10E3/UL (ref 150–450)
POTASSIUM BLD-SCNC: 3.3 MMOL/L (ref 3.4–5.3)
PROT SERPL-MCNC: 6.5 G/DL (ref 6.8–8.8)
RBC # BLD AUTO: 2.93 10E6/UL (ref 3.8–5.2)
SODIUM SERPL-SCNC: 139 MMOL/L (ref 133–144)
WBC # BLD AUTO: 3.7 10E3/UL (ref 4–11)

## 2022-11-28 PROCEDURE — 82040 ASSAY OF SERUM ALBUMIN: CPT | Performed by: INTERNAL MEDICINE

## 2022-11-28 PROCEDURE — 36415 COLL VENOUS BLD VENIPUNCTURE: CPT

## 2022-11-28 PROCEDURE — 99214 OFFICE O/P EST MOD 30 MIN: CPT | Performed by: NURSE PRACTITIONER

## 2022-11-28 PROCEDURE — 96401 CHEMO ANTI-NEOPL SQ/IM: CPT

## 2022-11-28 PROCEDURE — 250N000011 HC RX IP 250 OP 636: Performed by: INTERNAL MEDICINE

## 2022-11-28 PROCEDURE — 85004 AUTOMATED DIFF WBC COUNT: CPT | Performed by: INTERNAL MEDICINE

## 2022-11-28 PROCEDURE — 80053 COMPREHEN METABOLIC PANEL: CPT | Performed by: INTERNAL MEDICINE

## 2022-11-28 PROCEDURE — G0463 HOSPITAL OUTPT CLINIC VISIT: HCPCS | Mod: 25

## 2022-11-28 PROCEDURE — 250N000013 HC RX MED GY IP 250 OP 250 PS 637: Performed by: INTERNAL MEDICINE

## 2022-11-28 RX ORDER — DEXAMETHASONE 4 MG/1
12 TABLET ORAL ONCE
Status: COMPLETED | OUTPATIENT
Start: 2022-11-28 | End: 2022-11-28

## 2022-11-28 RX ORDER — ACETAMINOPHEN 325 MG/1
650 TABLET ORAL ONCE
Status: COMPLETED | OUTPATIENT
Start: 2022-11-28 | End: 2022-11-28

## 2022-11-28 RX ORDER — DIPHENHYDRAMINE HCL 25 MG
50 CAPSULE ORAL ONCE
Status: COMPLETED | OUTPATIENT
Start: 2022-11-28 | End: 2022-11-28

## 2022-11-28 RX ORDER — MONTELUKAST SODIUM 10 MG/1
10 TABLET ORAL ONCE
Status: COMPLETED | OUTPATIENT
Start: 2022-11-28 | End: 2022-11-28

## 2022-11-28 RX ORDER — POTASSIUM CHLORIDE 750 MG/1
40 TABLET, EXTENDED RELEASE ORAL ONCE
Status: COMPLETED | OUTPATIENT
Start: 2022-11-28 | End: 2022-11-28

## 2022-11-28 RX ADMIN — MONTELUKAST 10 MG: 10 TABLET, FILM COATED ORAL at 10:06

## 2022-11-28 RX ADMIN — ACETAMINOPHEN 650 MG: 325 TABLET, FILM COATED ORAL at 10:05

## 2022-11-28 RX ADMIN — POTASSIUM CHLORIDE 40 MEQ: 750 TABLET, FILM COATED, EXTENDED RELEASE ORAL at 10:21

## 2022-11-28 RX ADMIN — DEXAMETHASONE 12 MG: 4 TABLET ORAL at 10:06

## 2022-11-28 RX ADMIN — DARATUMUMAB AND HYALURONIDASE-FIHJ (HUMAN RECOMBINANT) 1800 MG: 1800; 30000 INJECTION SUBCUTANEOUS at 10:44

## 2022-11-28 RX ADMIN — DIPHENHYDRAMINE HYDROCHLORIDE 50 MG: 25 CAPSULE ORAL at 10:07

## 2022-11-28 ASSESSMENT — PAIN SCALES - GENERAL: PAINLEVEL: NO PAIN (0)

## 2022-11-28 NOTE — PROGRESS NOTES
Oncology/Hematology Visit Note  Nov 28, 2022    Reason for Visit: follow up of multiple myeloma  Patient Dr. Parry  Continue treatment with daratumumab Revlimid and dexamethasone      Interval History:  Patient reports she continues to have fatigue.  Reports this is chronic issue for her denies any changes or worsening of fatigue.  Denies fever chills sweats cough shortness of breath chest pain denies nausea vomiting diarrhea abdominal pain bleeding      Review of Systems:  14 point ROS of systems including Constitutional, Eyes, Respiratory, Cardiovascular, Gastroenterology, Genitourinary, Integumentary, Muscularskeletal, Psychiatric were all negative except for pertinent positives noted in my HPI.    Physical Examination:  General: The patient is a pleasant female in no acute distress.  /72   Pulse 73   Temp 97.7  F (36.5  C) (Oral)   Resp 16   Wt 59.4 kg (131 lb)   SpO2 95%   BMI 23.96 kg/m    HEENT: EOMI, PERRL. Sclerae are anicteric. Oral mucosa is pink and moist with no lesions or thrush.   Lymph: Neck is supple with no lymphadenopathy in the cervical or supraclavicular areas.   Heart: Regular rate and rhythm.   Lungs: Clear to auscultation bilaterally.   GI: Bowel sounds present, soft, nontender with no palpable hepatosplenomegaly or masses.   Extremities: No lower extremity edema noted bilaterally.   Skin: No rashes, petechiae, or bruising noted on exposed skin.    LABS  -CBC CMP results reviewed    Assessment and Plan:  This is an 87-year-old female with    Multiple myeloma  Currently on Revlimid daratumumab and dexamethasone  Potential side effects reviewed  Labs reviewed  Continue with treatment      Prophylaxis  Continue with aspirin and acyclovir    Bone health  Continue to hold Zometa until dental work-up is done      Anemia  Patient denies bleeding  Overall stable    Thrombocytopenia  Patient denies bleeding  Complications of thrombocytopenia reviewed  Call our clinic or go to ER in the  event of bleeding fall injury or bruising    Fatigue  Chronic issue  Multifactorial  Continue to monitor  Call our clinic with any changes or worsening of fatigue      Hypokalemia  Replace per pharmacy protocol  Continue with oral potassium at home        MARIUSZ Mckeon CNP [facility]     Chart documentation with Dragon Voice recognition Software. Although reviewed after completion, some words and grammatical errors may remain.

## 2022-11-28 NOTE — LETTER
"    11/28/2022         RE: Quiana Dunaway  [address] [facility] [address]        Dear Colleague,    Thank you for referring your patient, Quiana Dunaway, to the [facility] [place]. Please see a copy of my visit note below.    Oncology Rooming Note    November 28, 2022 9:32 AM   Quiana Dunaway is a 87 year old female who presents for:    Chief Complaint   Patient presents with     Oncology Clinic Visit     Initial Vitals: /72   Pulse 73   Temp 97.7  F (36.5  C) (Oral)   Resp 16   Wt 59.4 kg (131 lb)   SpO2 95%   BMI 23.96 kg/m   Estimated body mass index is 23.96 kg/m  as calculated from the following:    Height as of 11/21/22: 1.575 m (5' 2\").    Weight as of this encounter: 59.4 kg (131 lb). Body surface area is 1.61 meters squared.  No Pain (0) Comment: Data Unavailable   No LMP recorded. Patient is postmenopausal.  Allergies reviewed: Yes  Medications reviewed: Yes    Medications: Medication refills not needed today.  Pharmacy name entered into [employer]:    [facility] - [place], [place] - [address]  [id number] - [place], [place] - [address] AT [facility]    Clinical concerns: worsening fatigue Juaquin Hammond was notified.      Shari J. Schoenberger, [facility]              Oncology/Hematology Visit Note  Nov 28, 2022    Reason for Visit: follow up of multiple myeloma  Patient Dr. Parry  Continue treatment with daratumumab Revlimid and dexamethasone      Interval History:  Patient reports she continues to have fatigue.  Reports this is chronic issue for her denies any changes or worsening of fatigue.  Denies fever chills sweats cough shortness of breath chest pain denies nausea vomiting diarrhea abdominal pain bleeding      Review of Systems:  14 point ROS of systems including Constitutional, Eyes, Respiratory, Cardiovascular, Gastroenterology, Genitourinary, Integumentary, Muscularskeletal, Psychiatric " were all negative except for pertinent positives noted in my HPI.    Physical Examination:  General: The patient is a pleasant female in no acute distress.  /72   Pulse 73   Temp 97.7  F (36.5  C) (Oral)   Resp 16   Wt 59.4 kg (131 lb)   SpO2 95%   BMI 23.96 kg/m    HEENT: EOMI, PERRL. Sclerae are anicteric. Oral mucosa is pink and moist with no lesions or thrush.   Lymph: Neck is supple with no lymphadenopathy in the cervical or supraclavicular areas.   Heart: Regular rate and rhythm.   Lungs: Clear to auscultation bilaterally.   GI: Bowel sounds present, soft, nontender with no palpable hepatosplenomegaly or masses.   Extremities: No lower extremity edema noted bilaterally.   Skin: No rashes, petechiae, or bruising noted on exposed skin.    LABS  -CBC CMP results reviewed    Assessment and Plan:  This is an 87-year-old female with    Multiple myeloma  Currently on Revlimid daratumumab and dexamethasone  Potential side effects reviewed  Labs reviewed  Continue with treatment      Prophylaxis  Continue with aspirin and acyclovir    Bone health  Continue to hold Zometa until dental work-up is done      Anemia  Patient denies bleeding  Overall stable    Thrombocytopenia  Patient denies bleeding  Complications of thrombocytopenia reviewed  Call our clinic or go to ER in the event of bleeding fall injury or bruising    Fatigue  Chronic issue  Multifactorial  Continue to monitor  Call our clinic with any changes or worsening of fatigue      Hypokalemia  Replace per pharmacy protocol  Continue with oral potassium at home        MARIUSZ Mckeon CNP  [facility]     Chart documentation with Dragon Voice recognition Software. Although reviewed after completion, some words and grammatical errors may remain.            Again, thank you for allowing me to participate in the care of your patient.        Sincerely,        MARIUSZ Mckeon CNP

## 2022-11-28 NOTE — PROGRESS NOTES
Infusion Nursing Note:  Quiana Dunaway presents today for C1D15 Darzalex Faspro.    Patient seen by provider today: Yes: Juaquin Hammond NP   present during visit today: Not Applicable.    Note: Potassium low at 3.3. Replaced per protocol. Pt confirms she takes her dexamethasone and acyclovir as prescribed.    Intravenous Access:  No Intravenous access/labs at this visit. Injection given with 23 G butterfly needle. Pt tolerated injection well.    Treatment Conditions:  Lab Results   Component Value Date    HGB 9.0 (L) 11/28/2022    WBC 3.7 (L) 11/28/2022    ANEU 4.1 11/05/2022    ANEUTAUTO 2.5 11/28/2022     (L) 11/28/2022      Results reviewed, labs MET treatment parameters, ok to proceed with treatment.    Post Infusion Assessment:  Patient tolerated injection without incident.  Site patent and intact, free from redness, edema or discomfort.  No evidence of extravasations.  Access discontinued per protocol.     Discharge Plan:   Patient declined prescription refills.  Discharge instructions reviewed with: Patient.  Patient and/or family verbalized understanding of discharge instructions and all questions answered.  AVS to patient via [employer].  Patient will return 12/2/21 for next appointment.   Patient discharged in stable condition accompanied by: daughter-in-law.  Departure Mode: Ambulatory with cane.      Aide Good RN

## 2022-11-28 NOTE — PROGRESS NOTES
Medical Assistant Note:  Quiana Dunaway presents today for blood draw.    Patient seen by provider today: Yes: marco a.   present during visit today: Not Applicable.    Concerns: No Concerns.    Procedure:  Lab draw site: left hand, Needle type: bf, Gauge: 23.    Post Assessment:  Labs drawn without difficulty: Yes.    Discharge Plan:  Departure Mode: Ambulatory.    Face to Face Time: 5 min.    Mariela Lyn, CMA

## 2022-11-28 NOTE — PROGRESS NOTES
"Oncology Rooming Note    November 28, 2022 9:32 AM   Quiana Dunaway is a 87 year old female who presents for:    Chief Complaint   Patient presents with     Oncology Clinic Visit     Initial Vitals: /72   Pulse 73   Temp 97.7  F (36.5  C) (Oral)   Resp 16   Wt 59.4 kg (131 lb)   SpO2 95%   BMI 23.96 kg/m   Estimated body mass index is 23.96 kg/m  as calculated from the following:    Height as of 11/21/22: 1.575 m (5' 2\").    Weight as of this encounter: 59.4 kg (131 lb). Body surface area is 1.61 meters squared.  No Pain (0) Comment: Data Unavailable   No LMP recorded. Patient is postmenopausal.  Allergies reviewed: Yes  Medications reviewed: Yes    Medications: Medication refills not needed today.  Pharmacy name entered into [employer]See:    [facility] - [place], [place] - [address]  [id number] - [place], [place] - [address] AT [facility]    Clinical concerns: worsening fatigue Juaquin Hammond was notified.      Shari J. Schoenberger, CMA            "

## 2022-11-30 ENCOUNTER — ANCILLARY PROCEDURE (OUTPATIENT)
Dept: CARDIOLOGY | Facility: CLINIC | Age: 87
End: 2022-11-30
Attending: INTERNAL MEDICINE
Payer: MEDICARE

## 2022-11-30 DIAGNOSIS — I49.5 SICK SINUS SYNDROME (H): ICD-10-CM

## 2022-11-30 DIAGNOSIS — Z95.0 CARDIAC PACEMAKER IN SITU: ICD-10-CM

## 2022-11-30 PROCEDURE — 93296 REM INTERROG EVL PM/IDS: CPT | Performed by: INTERNAL MEDICINE

## 2022-11-30 PROCEDURE — 93294 REM INTERROG EVL PM/LDLS PM: CPT | Performed by: INTERNAL MEDICINE

## 2022-12-01 ENCOUNTER — ANCILLARY PROCEDURE (OUTPATIENT)
Dept: GENERAL RADIOLOGY | Facility: CLINIC | Age: 87
End: 2022-12-01
Attending: INTERNAL MEDICINE
Payer: MEDICARE

## 2022-12-01 ENCOUNTER — OFFICE VISIT (OUTPATIENT)
Dept: FAMILY MEDICINE | Facility: CLINIC | Age: 87
End: 2022-12-01
Payer: MEDICARE

## 2022-12-01 VITALS
HEART RATE: 78 BPM | TEMPERATURE: 97.9 F | BODY MASS INDEX: 23.77 KG/M2 | SYSTOLIC BLOOD PRESSURE: 121 MMHG | DIASTOLIC BLOOD PRESSURE: 70 MMHG | WEIGHT: 130 LBS | RESPIRATION RATE: 18 BRPM | OXYGEN SATURATION: 95 %

## 2022-12-01 DIAGNOSIS — M54.2 NECK PAIN: ICD-10-CM

## 2022-12-01 DIAGNOSIS — E05.20 TOXIC MULTINODUL GOITER: ICD-10-CM

## 2022-12-01 DIAGNOSIS — N18.30 STAGE 3 CHRONIC KIDNEY DISEASE, UNSPECIFIED WHETHER STAGE 3A OR 3B CKD (H): ICD-10-CM

## 2022-12-01 DIAGNOSIS — M54.2 NECK PAIN: Primary | ICD-10-CM

## 2022-12-01 DIAGNOSIS — C90.00 MULTIPLE MYELOMA NOT HAVING ACHIEVED REMISSION (H): ICD-10-CM

## 2022-12-01 PROCEDURE — 72040 X-RAY EXAM NECK SPINE 2-3 VW: CPT | Mod: TC | Performed by: RADIOLOGY

## 2022-12-01 PROCEDURE — 99214 OFFICE O/P EST MOD 30 MIN: CPT | Performed by: INTERNAL MEDICINE

## 2022-12-01 ASSESSMENT — PAIN SCALES - GENERAL: PAINLEVEL: EXTREME PAIN (8)

## 2022-12-01 NOTE — PROGRESS NOTES
Assessment & Plan     Neck pain  Check x-ray  Hopefully there are not lytic lesions  Dicussed that MRI would be more sensitive, but she declined, but agreed to x-ray  Discussed some symptom management strategies  - XR Cervical Spine 2/3 Views; Future    Multiple myeloma not having achieved remission (H)  Continue follow up with oncology as directed     Toxic multinodul goiter  Check TFTs tomorrow when she has labs drawn at oncology   - TSH with free T4 reflex; Future    Stage 3 chronic kidney disease, unspecified whether stage 3a or 3b CKD (H)  Stable         21 minutes spent on the date of the encounter doing chart review, history and exam, documentation and further activities per the note           Return in about 6 weeks (around 1/12/2023).   Patient instructed to return to clinic or contact us sooner if symptoms worsen or new symptoms develop.     César Chung MD  [facility] MAGALY Araya is a 87 year old accompanied by her daughter in law, presenting for the following health issues:  Follow Up (Patient coming in for a 6 week follow up.)      HPI       Follow up myeloma, hypertension, toxic multidnodular goiter, CKD   Daughter in law states that Quiana has been complains of neck pain for over one month  No radiation to arms and no new arm numbness or weakness  No injury or trauma  Dr. Parry recommend and MRI, but Quiana has been reluctant     Review of Systems         Objective    /70 (BP Location: Right arm, Patient Position: Sitting, Cuff Size: Adult Regular)   Pulse 78   Temp 97.9  F (36.6  C) (Temporal)   Resp 18   Wt 59 kg (130 lb)   SpO2 95%   BMI 23.77 kg/m    Body mass index is 23.77 kg/m .  Physical Exam   GEN:  Comfortable, getting a little more frail  NECK:  No tenderness to palpation over spinous processes of cervical spine, normal neck ROM, normal strength in bilateral upper extremity muscle groups, normal sensation in bilateral upper extremity dermatomes,  Spurling's maneuver does NOT reproduce radicular symptoms bilaterally     c-spine x-ray pending

## 2022-12-02 ENCOUNTER — LAB (OUTPATIENT)
Dept: INFUSION THERAPY | Facility: CLINIC | Age: 87
End: 2022-12-02
Attending: INTERNAL MEDICINE
Payer: MEDICARE

## 2022-12-02 DIAGNOSIS — E05.20 TOXIC MULTINODUL GOITER: ICD-10-CM

## 2022-12-02 DIAGNOSIS — Z95.0 CARDIAC PACEMAKER IN SITU: ICD-10-CM

## 2022-12-02 DIAGNOSIS — C90.00 MULTIPLE MYELOMA NOT HAVING ACHIEVED REMISSION (H): ICD-10-CM

## 2022-12-02 DIAGNOSIS — I49.5 SICK SINUS SYNDROME (H): Primary | ICD-10-CM

## 2022-12-02 LAB
ALBUMIN SERPL-MCNC: 3 G/DL (ref 3.4–5)
ALP SERPL-CCNC: 70 U/L (ref 40–150)
ALT SERPL W P-5'-P-CCNC: 15 U/L (ref 0–50)
ANION GAP SERPL CALCULATED.3IONS-SCNC: 5 MMOL/L (ref 3–14)
AST SERPL W P-5'-P-CCNC: 9 U/L (ref 0–45)
BASOPHILS # BLD AUTO: 0 10E3/UL (ref 0–0.2)
BASOPHILS NFR BLD AUTO: 0 %
BILIRUB SERPL-MCNC: 0.3 MG/DL (ref 0.2–1.3)
BUN SERPL-MCNC: 24 MG/DL (ref 7–30)
CALCIUM SERPL-MCNC: 8.6 MG/DL (ref 8.5–10.1)
CHLORIDE BLD-SCNC: 108 MMOL/L (ref 94–109)
CO2 SERPL-SCNC: 28 MMOL/L (ref 20–32)
CREAT SERPL-MCNC: 0.67 MG/DL (ref 0.52–1.04)
EOSINOPHIL # BLD AUTO: 0.1 10E3/UL (ref 0–0.7)
EOSINOPHIL NFR BLD AUTO: 2 %
ERYTHROCYTE [DISTWIDTH] IN BLOOD BY AUTOMATED COUNT: 16.4 % (ref 10–15)
GFR SERPL CREATININE-BSD FRML MDRD: 85 ML/MIN/1.73M2
GLUCOSE BLD-MCNC: 76 MG/DL (ref 70–99)
HCT VFR BLD AUTO: 31.2 % (ref 35–47)
HGB BLD-MCNC: 9.8 G/DL (ref 11.7–15.7)
IMM GRANULOCYTES # BLD: 0 10E3/UL
IMM GRANULOCYTES NFR BLD: 0 %
LYMPHOCYTES # BLD AUTO: 1 10E3/UL (ref 0.8–5.3)
LYMPHOCYTES NFR BLD AUTO: 27 %
MCH RBC QN AUTO: 30.4 PG (ref 26.5–33)
MCHC RBC AUTO-ENTMCNC: 31.4 G/DL (ref 31.5–36.5)
MCV RBC AUTO: 97 FL (ref 78–100)
MONOCYTES # BLD AUTO: 0.4 10E3/UL (ref 0–1.3)
MONOCYTES NFR BLD AUTO: 12 %
NEUTROPHILS # BLD AUTO: 2.3 10E3/UL (ref 1.6–8.3)
NEUTROPHILS NFR BLD AUTO: 59 %
NRBC # BLD AUTO: 0 10E3/UL
NRBC BLD AUTO-RTO: 0 /100
PLATELET # BLD AUTO: 123 10E3/UL (ref 150–450)
POTASSIUM BLD-SCNC: 3.7 MMOL/L (ref 3.4–5.3)
PROT SERPL-MCNC: 6.2 G/DL (ref 6.8–8.8)
RBC # BLD AUTO: 3.22 10E6/UL (ref 3.8–5.2)
SODIUM SERPL-SCNC: 141 MMOL/L (ref 133–144)
TSH SERPL DL<=0.005 MIU/L-ACNC: 1.69 MU/L (ref 0.4–4)
WBC # BLD AUTO: 3.8 10E3/UL (ref 4–11)

## 2022-12-02 PROCEDURE — 36415 COLL VENOUS BLD VENIPUNCTURE: CPT

## 2022-12-02 PROCEDURE — 85004 AUTOMATED DIFF WBC COUNT: CPT | Performed by: INTERNAL MEDICINE

## 2022-12-02 PROCEDURE — 80053 COMPREHEN METABOLIC PANEL: CPT | Performed by: INTERNAL MEDICINE

## 2022-12-02 PROCEDURE — 84443 ASSAY THYROID STIM HORMONE: CPT | Performed by: INTERNAL MEDICINE

## 2022-12-02 NOTE — PROGRESS NOTES
Medical Assistant Note:  Quiana Dunaway presents today for blood draw.    Patient seen by provider today: No.   present during visit today: Not Applicable.    Concerns: No Concerns.    Procedure:  Lab draw site: Right hand, Needle type: BF, Gauge: 23g.    Post Assessment:  Labs drawn without difficulty: Yes.    Discharge Plan:  Departure Mode: Ambulatory.    Face to Face Time: 5.    Lauren Arredondo, CMA

## 2022-12-02 NOTE — RESULT ENCOUNTER NOTE
The following letter pertains to your most recent diagnostic tests:    Good news! The thyroid result looks healthy for you.        Sincerely,    Dr. Chung

## 2022-12-03 NOTE — RESULT ENCOUNTER NOTE
The following letter pertains to your most recent diagnostic tests:    Good news! The x-ray does not show evidence for cancer in the neck bones, although, as we discussed, an MRI might be a little more sensitive to detect this.  Overall, the degenerative findings in the neck are not surprising to fin in someone who has used her neck for 87 years.   This x-ray suggests that there is not a dangerous cause for you neck pain and that the heat, Tylenol and 4% lidocaine patches that we discussed should hopefully help the symptoms.  If the symptoms are persistent and intolerable we can always obtain the MRI.  Inform me if you wish to pursue that test.           Sincerely,    Dr. Chung

## 2022-12-05 ENCOUNTER — INFUSION THERAPY VISIT (OUTPATIENT)
Dept: INFUSION THERAPY | Facility: CLINIC | Age: 87
End: 2022-12-05
Attending: INTERNAL MEDICINE
Payer: MEDICARE

## 2022-12-05 ENCOUNTER — TELEPHONE (OUTPATIENT)
Dept: PHARMACY | Facility: CLINIC | Age: 87
End: 2022-12-05

## 2022-12-05 VITALS
OXYGEN SATURATION: 97 % | TEMPERATURE: 98 F | HEART RATE: 88 BPM | RESPIRATION RATE: 18 BRPM | SYSTOLIC BLOOD PRESSURE: 129 MMHG | DIASTOLIC BLOOD PRESSURE: 62 MMHG

## 2022-12-05 DIAGNOSIS — C90.00 MULTIPLE MYELOMA NOT HAVING ACHIEVED REMISSION (H): Primary | ICD-10-CM

## 2022-12-05 LAB
MDC_IDC_EPISODE_DTM: NORMAL
MDC_IDC_EPISODE_DTM: NORMAL
MDC_IDC_EPISODE_DURATION: 565 S
MDC_IDC_EPISODE_DURATION: 64 S
MDC_IDC_EPISODE_ID: 46
MDC_IDC_EPISODE_ID: 47
MDC_IDC_EPISODE_TYPE: NORMAL
MDC_IDC_EPISODE_TYPE: NORMAL
MDC_IDC_LEAD_IMPLANT_DT: NORMAL
MDC_IDC_LEAD_IMPLANT_DT: NORMAL
MDC_IDC_LEAD_LOCATION: NORMAL
MDC_IDC_LEAD_LOCATION: NORMAL
MDC_IDC_LEAD_LOCATION_DETAIL_1: NORMAL
MDC_IDC_LEAD_LOCATION_DETAIL_1: NORMAL
MDC_IDC_LEAD_MFG: NORMAL
MDC_IDC_LEAD_MFG: NORMAL
MDC_IDC_LEAD_MODEL: NORMAL
MDC_IDC_LEAD_MODEL: NORMAL
MDC_IDC_LEAD_POLARITY_TYPE: NORMAL
MDC_IDC_LEAD_POLARITY_TYPE: NORMAL
MDC_IDC_LEAD_SERIAL: NORMAL
MDC_IDC_LEAD_SERIAL: NORMAL
MDC_IDC_MSMT_BATTERY_DTM: NORMAL
MDC_IDC_MSMT_BATTERY_REMAINING_LONGEVITY: 24 MO
MDC_IDC_MSMT_BATTERY_RRT_TRIGGER: 2.83
MDC_IDC_MSMT_BATTERY_STATUS: NORMAL
MDC_IDC_MSMT_BATTERY_VOLTAGE: 2.95 V
MDC_IDC_MSMT_LEADCHNL_RA_IMPEDANCE_VALUE: 323 OHM
MDC_IDC_MSMT_LEADCHNL_RA_IMPEDANCE_VALUE: 342 OHM
MDC_IDC_MSMT_LEADCHNL_RA_PACING_THRESHOLD_AMPLITUDE: 0.38 V
MDC_IDC_MSMT_LEADCHNL_RA_PACING_THRESHOLD_PULSEWIDTH: 0.4 MS
MDC_IDC_MSMT_LEADCHNL_RA_SENSING_INTR_AMPL: 2.62 MV
MDC_IDC_MSMT_LEADCHNL_RA_SENSING_INTR_AMPL: 2.62 MV
MDC_IDC_MSMT_LEADCHNL_RV_IMPEDANCE_VALUE: 361 OHM
MDC_IDC_MSMT_LEADCHNL_RV_IMPEDANCE_VALUE: 475 OHM
MDC_IDC_MSMT_LEADCHNL_RV_PACING_THRESHOLD_AMPLITUDE: 2.25 V
MDC_IDC_MSMT_LEADCHNL_RV_PACING_THRESHOLD_PULSEWIDTH: 0.4 MS
MDC_IDC_MSMT_LEADCHNL_RV_SENSING_INTR_AMPL: 4.25 MV
MDC_IDC_MSMT_LEADCHNL_RV_SENSING_INTR_AMPL: 4.25 MV
MDC_IDC_PG_IMPLANT_DTM: NORMAL
MDC_IDC_PG_MFG: NORMAL
MDC_IDC_PG_MODEL: NORMAL
MDC_IDC_PG_SERIAL: NORMAL
MDC_IDC_PG_TYPE: NORMAL
MDC_IDC_SESS_CLINIC_NAME: NORMAL
MDC_IDC_SESS_DTM: NORMAL
MDC_IDC_SESS_TYPE: NORMAL
MDC_IDC_SET_BRADY_AT_MODE_SWITCH_RATE: 171 {BEATS}/MIN
MDC_IDC_SET_BRADY_HYSTRATE: NORMAL
MDC_IDC_SET_BRADY_LOWRATE: 60 {BEATS}/MIN
MDC_IDC_SET_BRADY_MAX_SENSOR_RATE: 120 {BEATS}/MIN
MDC_IDC_SET_BRADY_MAX_TRACKING_RATE: 120 {BEATS}/MIN
MDC_IDC_SET_BRADY_MODE: NORMAL
MDC_IDC_SET_BRADY_PAV_DELAY_LOW: 300 MS
MDC_IDC_SET_BRADY_SAV_DELAY_LOW: 300 MS
MDC_IDC_SET_LEADCHNL_RA_PACING_AMPLITUDE: 1.5 V
MDC_IDC_SET_LEADCHNL_RA_PACING_ANODE_ELECTRODE_1: NORMAL
MDC_IDC_SET_LEADCHNL_RA_PACING_ANODE_LOCATION_1: NORMAL
MDC_IDC_SET_LEADCHNL_RA_PACING_CAPTURE_MODE: NORMAL
MDC_IDC_SET_LEADCHNL_RA_PACING_CATHODE_ELECTRODE_1: NORMAL
MDC_IDC_SET_LEADCHNL_RA_PACING_CATHODE_LOCATION_1: NORMAL
MDC_IDC_SET_LEADCHNL_RA_PACING_POLARITY: NORMAL
MDC_IDC_SET_LEADCHNL_RA_PACING_PULSEWIDTH: 0.4 MS
MDC_IDC_SET_LEADCHNL_RA_SENSING_ANODE_ELECTRODE_1: NORMAL
MDC_IDC_SET_LEADCHNL_RA_SENSING_ANODE_LOCATION_1: NORMAL
MDC_IDC_SET_LEADCHNL_RA_SENSING_CATHODE_ELECTRODE_1: NORMAL
MDC_IDC_SET_LEADCHNL_RA_SENSING_CATHODE_LOCATION_1: NORMAL
MDC_IDC_SET_LEADCHNL_RA_SENSING_POLARITY: NORMAL
MDC_IDC_SET_LEADCHNL_RA_SENSING_SENSITIVITY: 0.3 MV
MDC_IDC_SET_LEADCHNL_RV_PACING_AMPLITUDE: 4.75 V
MDC_IDC_SET_LEADCHNL_RV_PACING_ANODE_ELECTRODE_1: NORMAL
MDC_IDC_SET_LEADCHNL_RV_PACING_ANODE_LOCATION_1: NORMAL
MDC_IDC_SET_LEADCHNL_RV_PACING_CAPTURE_MODE: NORMAL
MDC_IDC_SET_LEADCHNL_RV_PACING_CATHODE_ELECTRODE_1: NORMAL
MDC_IDC_SET_LEADCHNL_RV_PACING_CATHODE_LOCATION_1: NORMAL
MDC_IDC_SET_LEADCHNL_RV_PACING_POLARITY: NORMAL
MDC_IDC_SET_LEADCHNL_RV_PACING_PULSEWIDTH: 0.4 MS
MDC_IDC_SET_LEADCHNL_RV_SENSING_ANODE_ELECTRODE_1: NORMAL
MDC_IDC_SET_LEADCHNL_RV_SENSING_ANODE_LOCATION_1: NORMAL
MDC_IDC_SET_LEADCHNL_RV_SENSING_CATHODE_ELECTRODE_1: NORMAL
MDC_IDC_SET_LEADCHNL_RV_SENSING_CATHODE_LOCATION_1: NORMAL
MDC_IDC_SET_LEADCHNL_RV_SENSING_POLARITY: NORMAL
MDC_IDC_SET_LEADCHNL_RV_SENSING_SENSITIVITY: 0.9 MV
MDC_IDC_SET_ZONE_DETECTION_INTERVAL: 350 MS
MDC_IDC_SET_ZONE_DETECTION_INTERVAL: 400 MS
MDC_IDC_SET_ZONE_TYPE: NORMAL
MDC_IDC_STAT_AT_BURDEN_PERCENT: 0 %
MDC_IDC_STAT_AT_DTM_END: NORMAL
MDC_IDC_STAT_AT_DTM_START: NORMAL
MDC_IDC_STAT_BRADY_AP_VP_PERCENT: 65.25 %
MDC_IDC_STAT_BRADY_AP_VS_PERCENT: 0.02 %
MDC_IDC_STAT_BRADY_AS_VP_PERCENT: 34.71 %
MDC_IDC_STAT_BRADY_AS_VS_PERCENT: 0.02 %
MDC_IDC_STAT_BRADY_DTM_END: NORMAL
MDC_IDC_STAT_BRADY_DTM_START: NORMAL
MDC_IDC_STAT_BRADY_RA_PERCENT_PACED: 65.19 %
MDC_IDC_STAT_BRADY_RV_PERCENT_PACED: 99.96 %
MDC_IDC_STAT_EPISODE_RECENT_COUNT: 0
MDC_IDC_STAT_EPISODE_RECENT_COUNT: 2
MDC_IDC_STAT_EPISODE_RECENT_COUNT_DTM_END: NORMAL
MDC_IDC_STAT_EPISODE_RECENT_COUNT_DTM_START: NORMAL
MDC_IDC_STAT_EPISODE_TOTAL_COUNT: 0
MDC_IDC_STAT_EPISODE_TOTAL_COUNT: 0
MDC_IDC_STAT_EPISODE_TOTAL_COUNT: 11
MDC_IDC_STAT_EPISODE_TOTAL_COUNT: 33
MDC_IDC_STAT_EPISODE_TOTAL_COUNT_DTM_END: NORMAL
MDC_IDC_STAT_EPISODE_TOTAL_COUNT_DTM_START: NORMAL
MDC_IDC_STAT_EPISODE_TYPE: NORMAL

## 2022-12-05 PROCEDURE — 96401 CHEMO ANTI-NEOPL SQ/IM: CPT

## 2022-12-05 PROCEDURE — 250N000011 HC RX IP 250 OP 636: Performed by: INTERNAL MEDICINE

## 2022-12-05 RX ORDER — LENALIDOMIDE 10 MG/1
10 CAPSULE ORAL DAILY
Qty: 14 CAPSULE | Refills: 0 | Status: SHIPPED | OUTPATIENT
Start: 2022-12-05 | End: 2023-01-16

## 2022-12-05 RX ORDER — DEXAMETHASONE 4 MG/1
4 TABLET ORAL WEEKLY
Qty: 16 TABLET | Refills: 3 | Status: SHIPPED | OUTPATIENT
Start: 2022-12-05 | End: 2022-12-26

## 2022-12-05 RX ADMIN — DARATUMUMAB AND HYALURONIDASE-FIHJ (HUMAN RECOMBINANT) 1800 MG: 1800; 30000 INJECTION SUBCUTANEOUS at 08:18

## 2022-12-05 NOTE — TELEPHONE ENCOUNTER
Oral Chemotherapy Monitoring Program   Medication: Revlimid  Rx: 10mg PO daily on days 1 through 14 of 21 day cycle   Auth #:[id number] obtained on 12/5/2022  Risk Category: adult female of non child bearing potential  Routine survey questions reviewed.   Rx to be Escribed to [facility]     Terrie Bowen [facility] Oncology Pharmacy Liaison  [phone]

## 2022-12-05 NOTE — PROGRESS NOTES
Infusion Nursing Note:  Quiana Dunaway presents today for C1D22 Darzalez Faspro.    Patient seen by provider today: No   present during visit today: Not Applicable.    Note: N/A.    Intravenous Access:  No Intravenous access/labs at this visit.    Treatment Conditions:  Lab Results   Component Value Date    HGB 9.8 (L) 12/02/2022    WBC 3.8 (L) 12/02/2022    ANEU 4.1 11/05/2022    ANEUTAUTO 2.3 12/02/2022     (L) 12/02/2022      Lab Results   Component Value Date     12/02/2022    POTASSIUM 3.7 12/02/2022    MAG 2.4 (H) 05/26/2020    CR 0.67 12/02/2022    HUMBERTO 8.6 12/02/2022    BILITOTAL 0.3 12/02/2022    ALBUMIN 3.0 (L) 12/02/2022    ALT 15 12/02/2022    AST 9 12/02/2022     Results reviewed, labs MET treatment parameters, ok to proceed with treatment.    Post Infusion Assessment:  Patient tolerated injection without incident.  Site patent and intact, free from redness, edema or discomfort.  No evidence of extravasations.     Discharge Plan:   Patient declined prescription refills.  Discharge instructions reviewed with: Patient.  Patient and/or family verbalized understanding of discharge instructions and all questions answered.  AVS to patient via [employer].  Patient will return 12/12 for next appointment.   Patient discharged in stable condition accompanied by: daughter.  Departure Mode: Ambulatory.      Hiram Chu RN

## 2022-12-09 ENCOUNTER — TELEPHONE (OUTPATIENT)
Dept: ONCOLOGY | Facility: CLINIC | Age: 87
End: 2022-12-09

## 2022-12-09 NOTE — TELEPHONE ENCOUNTER
Patient's daughter in law, Regi, called to report that Quiana has been dizzy this morning and fell out of bed around 7 AM. She did not get hurt and did not bump her head. She fell on her bottom side and feels a little sore.    Regi reports that Quiana took 20 mg of melatonin last night because she was having trouble sleeping. She does not normally take melatonin. She still did not sleep well after the melatonin.     She is currently sitting up on a chair and states that the dizziness is a little better, but not resolved. When she lays down it's better. Describes it as the room spinning around her. Regi has been encouraging fluids. Reports that her oxygen saturation is 97% and her systolic blood pressure is in the 150s because Quiana is nervous.     Denies chest pain. Has some shortness of breath, but has this at baseline and it is not any worse than normal. Denies any changes in vision or hearing. No facial numbness or drooping, or trouble moving one side of the body. No diarrhea or vomiting. States she has been drinking a lot of fluids and drank a lot yesterday too.       She also started Darzalex 3 weeks ago and last had it on 12/5.    Will route to [facility] for recommendations.    Stacia Roque, RN  Triage Nurse Advisor  [facility]

## 2022-12-09 NOTE — TELEPHONE ENCOUNTER
Juaquin Hammond, MARIUSZ CNP  You; Alecia Briggs RN Just now (10:37 AM)     GK  Could be   With any changes or worsening go to ER      Called Regi and gave her Juaquin's recommendations and she verbalized understanding.

## 2022-12-09 NOTE — TELEPHONE ENCOUNTER
Regi called back and stated that Quiana was laying in bed on her back, and then reached over to her phone on her side. When she reached over she got dizzy. They are inquiring if this is anything to be concerned about. Discussed that sometimes positional changes can provoke dizziness.     Discussed Juaquin's previous recommendation to seek care in the ED with any changes or worsening, and inquired if symptoms were worsening. She stated that she wasn't sure, and that she was lying down currently and resting. They will continue to monitor for now.

## 2022-12-10 ENCOUNTER — APPOINTMENT (OUTPATIENT)
Dept: CT IMAGING | Facility: CLINIC | Age: 87
DRG: 149 | End: 2022-12-10
Attending: EMERGENCY MEDICINE
Payer: MEDICARE

## 2022-12-10 ENCOUNTER — HOSPITAL ENCOUNTER (INPATIENT)
Facility: CLINIC | Age: 87
LOS: 3 days | Discharge: HOME HEALTH | DRG: 149 | End: 2022-12-14
Attending: EMERGENCY MEDICINE | Admitting: INTERNAL MEDICINE
Payer: MEDICARE

## 2022-12-10 ENCOUNTER — TRANSFERRED RECORDS (OUTPATIENT)
Dept: MEDSURG UNIT | Facility: CLINIC | Age: 87
End: 2022-12-10

## 2022-12-10 DIAGNOSIS — C79.51 SECONDARY MALIGNANT NEOPLASM OF BONE (H): ICD-10-CM

## 2022-12-10 DIAGNOSIS — N18.30 STAGE 3 CHRONIC KIDNEY DISEASE, UNSPECIFIED WHETHER STAGE 3A OR 3B CKD (H): Primary | ICD-10-CM

## 2022-12-10 DIAGNOSIS — C90.00 MULTIPLE MYELOMA NOT HAVING ACHIEVED REMISSION (H): ICD-10-CM

## 2022-12-10 DIAGNOSIS — R42 VERTIGO: ICD-10-CM

## 2022-12-10 DIAGNOSIS — J01.90 ACUTE SINUSITIS, RECURRENCE NOT SPECIFIED, UNSPECIFIED LOCATION: ICD-10-CM

## 2022-12-10 DIAGNOSIS — B33.8 RSV INFECTION: ICD-10-CM

## 2022-12-10 LAB
ALBUMIN SERPL-MCNC: 2.7 G/DL (ref 3.4–5)
ALBUMIN UR-MCNC: 10 MG/DL
ALP SERPL-CCNC: 83 U/L (ref 40–150)
ALT SERPL W P-5'-P-CCNC: 16 U/L (ref 0–50)
ANION GAP SERPL CALCULATED.3IONS-SCNC: 6 MMOL/L (ref 3–14)
APPEARANCE UR: CLEAR
AST SERPL W P-5'-P-CCNC: 7 U/L (ref 0–45)
ATRIAL RATE - MUSE: 62 BPM
BACTERIA #/AREA URNS HPF: ABNORMAL /HPF
BASOPHILS # BLD MANUAL: 0 10E3/UL (ref 0–0.2)
BASOPHILS NFR BLD MANUAL: 2 %
BILIRUB SERPL-MCNC: 0.6 MG/DL (ref 0.2–1.3)
BILIRUB UR QL STRIP: NEGATIVE
BUN SERPL-MCNC: 19 MG/DL (ref 7–30)
CALCIUM SERPL-MCNC: 8.3 MG/DL (ref 8.5–10.1)
CHLORIDE BLD-SCNC: 107 MMOL/L (ref 94–109)
CO2 SERPL-SCNC: 25 MMOL/L (ref 20–32)
COLOR UR AUTO: ABNORMAL
CREAT SERPL-MCNC: 0.69 MG/DL (ref 0.52–1.04)
DIASTOLIC BLOOD PRESSURE - MUSE: NORMAL MMHG
ELLIPTOCYTES BLD QL SMEAR: ABNORMAL
EOSINOPHIL # BLD MANUAL: 0 10E3/UL (ref 0–0.7)
EOSINOPHIL NFR BLD MANUAL: 0 %
ERYTHROCYTE [DISTWIDTH] IN BLOOD BY AUTOMATED COUNT: 15.9 % (ref 10–15)
FLUAV RNA SPEC QL NAA+PROBE: NEGATIVE
FLUBV RNA RESP QL NAA+PROBE: NEGATIVE
GFR SERPL CREATININE-BSD FRML MDRD: 84 ML/MIN/1.73M2
GLUCOSE BLD-MCNC: 110 MG/DL (ref 70–99)
GLUCOSE UR STRIP-MCNC: NEGATIVE MG/DL
HCT VFR BLD AUTO: 28.8 % (ref 35–47)
HGB BLD-MCNC: 9.4 G/DL (ref 11.7–15.7)
HGB UR QL STRIP: NEGATIVE
HOLD SPECIMEN: NORMAL
HOLD SPECIMEN: NORMAL
INTERPRETATION ECG - MUSE: NORMAL
KETONES UR STRIP-MCNC: NEGATIVE MG/DL
LEUKOCYTE ESTERASE UR QL STRIP: NEGATIVE
LIPASE SERPL-CCNC: 72 U/L (ref 73–393)
LYMPHOCYTES # BLD MANUAL: 0.9 10E3/UL (ref 0.8–5.3)
LYMPHOCYTES NFR BLD MANUAL: 59 %
MCH RBC QN AUTO: 30.6 PG (ref 26.5–33)
MCHC RBC AUTO-ENTMCNC: 32.6 G/DL (ref 31.5–36.5)
MCV RBC AUTO: 94 FL (ref 78–100)
MONOCYTES # BLD MANUAL: 0.3 10E3/UL (ref 0–1.3)
MONOCYTES NFR BLD MANUAL: 20 %
MUCOUS THREADS #/AREA URNS LPF: PRESENT /LPF
NEUTROPHILS # BLD MANUAL: 0.3 10E3/UL (ref 1.6–8.3)
NEUTROPHILS NFR BLD MANUAL: 19 %
NITRATE UR QL: NEGATIVE
NT-PROBNP SERPL-MCNC: 209 PG/ML (ref 0–1800)
P AXIS - MUSE: 104 DEGREES
PH UR STRIP: 7 [PH] (ref 5–7)
PLAT MORPH BLD: ABNORMAL
PLATELET # BLD AUTO: 96 10E3/UL (ref 150–450)
POTASSIUM BLD-SCNC: 3.5 MMOL/L (ref 3.4–5.3)
PR INTERVAL - MUSE: 298 MS
PROT SERPL-MCNC: 6.1 G/DL (ref 6.8–8.8)
QRS DURATION - MUSE: 152 MS
QT - MUSE: 492 MS
QTC - MUSE: 499 MS
R AXIS - MUSE: -50 DEGREES
RBC # BLD AUTO: 3.07 10E6/UL (ref 3.8–5.2)
RBC MORPH BLD: ABNORMAL
RBC URINE: 3 /HPF
RSV RNA SPEC NAA+PROBE: POSITIVE
SARS-COV-2 RNA RESP QL NAA+PROBE: NEGATIVE
SMUDGE CELLS BLD QL SMEAR: PRESENT
SODIUM SERPL-SCNC: 138 MMOL/L (ref 133–144)
SP GR UR STRIP: 1.01 (ref 1–1.03)
SQUAMOUS EPITHELIAL: 6 /HPF
SYSTOLIC BLOOD PRESSURE - MUSE: NORMAL MMHG
T AXIS - MUSE: 70 DEGREES
TSH SERPL DL<=0.005 MIU/L-ACNC: 1.26 MU/L (ref 0.4–4)
UROBILINOGEN UR STRIP-MCNC: NORMAL MG/DL
VENTRICULAR RATE- MUSE: 62 BPM
WBC # BLD AUTO: 1.5 10E3/UL (ref 4–11)
WBC URINE: 4 /HPF

## 2022-12-10 PROCEDURE — 250N000013 HC RX MED GY IP 250 OP 250 PS 637: Performed by: EMERGENCY MEDICINE

## 2022-12-10 PROCEDURE — 85007 BL SMEAR W/DIFF WBC COUNT: CPT | Performed by: EMERGENCY MEDICINE

## 2022-12-10 PROCEDURE — 96375 TX/PRO/DX INJ NEW DRUG ADDON: CPT

## 2022-12-10 PROCEDURE — G1010 CDSM STANSON: HCPCS

## 2022-12-10 PROCEDURE — 81001 URINALYSIS AUTO W/SCOPE: CPT | Performed by: EMERGENCY MEDICINE

## 2022-12-10 PROCEDURE — 36415 COLL VENOUS BLD VENIPUNCTURE: CPT | Performed by: EMERGENCY MEDICINE

## 2022-12-10 PROCEDURE — 99220 PR INITIAL OBSERVATION CARE,LEVEL III: CPT | Mod: AI | Performed by: INTERNAL MEDICINE

## 2022-12-10 PROCEDURE — 250N000011 HC RX IP 250 OP 636: Performed by: EMERGENCY MEDICINE

## 2022-12-10 PROCEDURE — 84443 ASSAY THYROID STIM HORMONE: CPT | Performed by: EMERGENCY MEDICINE

## 2022-12-10 PROCEDURE — 83880 ASSAY OF NATRIURETIC PEPTIDE: CPT | Performed by: EMERGENCY MEDICINE

## 2022-12-10 PROCEDURE — 83690 ASSAY OF LIPASE: CPT | Performed by: EMERGENCY MEDICINE

## 2022-12-10 PROCEDURE — 99285 EMERGENCY DEPT VISIT HI MDM: CPT | Mod: 25

## 2022-12-10 PROCEDURE — 258N000003 HC RX IP 258 OP 636: Performed by: INTERNAL MEDICINE

## 2022-12-10 PROCEDURE — 70496 CT ANGIOGRAPHY HEAD: CPT | Mod: MG

## 2022-12-10 PROCEDURE — C9803 HOPD COVID-19 SPEC COLLECT: HCPCS

## 2022-12-10 PROCEDURE — 250N000009 HC RX 250: Performed by: EMERGENCY MEDICINE

## 2022-12-10 PROCEDURE — 250N000011 HC RX IP 250 OP 636: Performed by: INTERNAL MEDICINE

## 2022-12-10 PROCEDURE — 80053 COMPREHEN METABOLIC PANEL: CPT | Performed by: EMERGENCY MEDICINE

## 2022-12-10 PROCEDURE — 70498 CT ANGIOGRAPHY NECK: CPT | Mod: MG

## 2022-12-10 PROCEDURE — 93005 ELECTROCARDIOGRAM TRACING: CPT

## 2022-12-10 PROCEDURE — G0378 HOSPITAL OBSERVATION PER HR: HCPCS

## 2022-12-10 PROCEDURE — 250N000013 HC RX MED GY IP 250 OP 250 PS 637: Performed by: INTERNAL MEDICINE

## 2022-12-10 PROCEDURE — 96366 THER/PROPH/DIAG IV INF ADDON: CPT

## 2022-12-10 PROCEDURE — 87637 SARSCOV2&INF A&B&RSV AMP PRB: CPT | Performed by: EMERGENCY MEDICINE

## 2022-12-10 PROCEDURE — 85027 COMPLETE CBC AUTOMATED: CPT | Performed by: EMERGENCY MEDICINE

## 2022-12-10 PROCEDURE — 96365 THER/PROPH/DIAG IV INF INIT: CPT

## 2022-12-10 RX ORDER — PROCHLORPERAZINE MALEATE 5 MG
5 TABLET ORAL EVERY 6 HOURS PRN
Status: DISCONTINUED | OUTPATIENT
Start: 2022-12-10 | End: 2022-12-14 | Stop reason: HOSPADM

## 2022-12-10 RX ORDER — ONDANSETRON 2 MG/ML
4 INJECTION INTRAMUSCULAR; INTRAVENOUS EVERY 6 HOURS PRN
Status: DISCONTINUED | OUTPATIENT
Start: 2022-12-10 | End: 2022-12-14 | Stop reason: HOSPADM

## 2022-12-10 RX ORDER — MECLIZINE HYDROCHLORIDE 25 MG/1
25 TABLET ORAL EVERY 6 HOURS PRN
Status: DISCONTINUED | OUTPATIENT
Start: 2022-12-10 | End: 2022-12-14 | Stop reason: HOSPADM

## 2022-12-10 RX ORDER — ONDANSETRON 8 MG/1
8 TABLET, FILM COATED ORAL EVERY 8 HOURS PRN
Status: DISCONTINUED | OUTPATIENT
Start: 2022-12-10 | End: 2022-12-10

## 2022-12-10 RX ORDER — SODIUM CHLORIDE 9 MG/ML
INJECTION, SOLUTION INTRAVENOUS CONTINUOUS
Status: DISCONTINUED | OUTPATIENT
Start: 2022-12-10 | End: 2022-12-11

## 2022-12-10 RX ORDER — BISACODYL 10 MG
10 SUPPOSITORY, RECTAL RECTAL DAILY PRN
Status: DISCONTINUED | OUTPATIENT
Start: 2022-12-10 | End: 2022-12-14 | Stop reason: HOSPADM

## 2022-12-10 RX ORDER — MIRTAZAPINE 15 MG/1
15 TABLET, FILM COATED ORAL AT BEDTIME
Status: DISCONTINUED | OUTPATIENT
Start: 2022-12-10 | End: 2022-12-14 | Stop reason: HOSPADM

## 2022-12-10 RX ORDER — LORAZEPAM 0.5 MG/1
0.25 TABLET ORAL ONCE
Status: COMPLETED | OUTPATIENT
Start: 2022-12-10 | End: 2022-12-10

## 2022-12-10 RX ORDER — ACETAMINOPHEN 650 MG/1
650 SUPPOSITORY RECTAL EVERY 6 HOURS PRN
Status: DISCONTINUED | OUTPATIENT
Start: 2022-12-10 | End: 2022-12-13

## 2022-12-10 RX ORDER — AMOXICILLIN 250 MG
2 CAPSULE ORAL 2 TIMES DAILY PRN
Status: DISCONTINUED | OUTPATIENT
Start: 2022-12-10 | End: 2022-12-14 | Stop reason: HOSPADM

## 2022-12-10 RX ORDER — IOPAMIDOL 755 MG/ML
75 INJECTION, SOLUTION INTRAVASCULAR ONCE
Status: COMPLETED | OUTPATIENT
Start: 2022-12-10 | End: 2022-12-10

## 2022-12-10 RX ORDER — CEFTRIAXONE 1 G/1
1 INJECTION, POWDER, FOR SOLUTION INTRAMUSCULAR; INTRAVENOUS EVERY 24 HOURS
Status: DISCONTINUED | OUTPATIENT
Start: 2022-12-10 | End: 2022-12-10

## 2022-12-10 RX ORDER — LENALIDOMIDE 10 MG/1
10 CAPSULE ORAL DAILY
Status: DISCONTINUED | OUTPATIENT
Start: 2022-12-12 | End: 2022-12-14 | Stop reason: HOSPADM

## 2022-12-10 RX ORDER — ACETAMINOPHEN 325 MG/1
650 TABLET ORAL EVERY 6 HOURS PRN
Status: DISCONTINUED | OUTPATIENT
Start: 2022-12-10 | End: 2022-12-13

## 2022-12-10 RX ORDER — PROCHLORPERAZINE 25 MG
12.5 SUPPOSITORY, RECTAL RECTAL EVERY 12 HOURS PRN
Status: DISCONTINUED | OUTPATIENT
Start: 2022-12-10 | End: 2022-12-14 | Stop reason: HOSPADM

## 2022-12-10 RX ORDER — AMPICILLIN AND SULBACTAM 2; 1 G/1; G/1
3 INJECTION, POWDER, FOR SOLUTION INTRAMUSCULAR; INTRAVENOUS EVERY 6 HOURS
Status: DISCONTINUED | OUTPATIENT
Start: 2022-12-10 | End: 2022-12-11

## 2022-12-10 RX ORDER — FAMOTIDINE 20 MG/1
20 TABLET, FILM COATED ORAL DAILY
Status: DISCONTINUED | OUTPATIENT
Start: 2022-12-11 | End: 2022-12-14 | Stop reason: HOSPADM

## 2022-12-10 RX ORDER — METHIMAZOLE 5 MG/1
2.5 TABLET ORAL DAILY
Status: DISCONTINUED | OUTPATIENT
Start: 2022-12-11 | End: 2022-12-14 | Stop reason: HOSPADM

## 2022-12-10 RX ORDER — AMOXICILLIN 250 MG
1 CAPSULE ORAL 2 TIMES DAILY PRN
Status: DISCONTINUED | OUTPATIENT
Start: 2022-12-10 | End: 2022-12-14 | Stop reason: HOSPADM

## 2022-12-10 RX ORDER — LORAZEPAM 0.5 MG/1
0.5 TABLET ORAL EVERY 6 HOURS PRN
Status: DISCONTINUED | OUTPATIENT
Start: 2022-12-10 | End: 2022-12-14 | Stop reason: HOSPADM

## 2022-12-10 RX ORDER — MECLIZINE HYDROCHLORIDE 25 MG/1
25 TABLET ORAL ONCE
Status: COMPLETED | OUTPATIENT
Start: 2022-12-10 | End: 2022-12-10

## 2022-12-10 RX ORDER — DEXAMETHASONE 4 MG/1
4 TABLET ORAL WEEKLY
Status: DISCONTINUED | OUTPATIENT
Start: 2022-12-11 | End: 2022-12-14 | Stop reason: HOSPADM

## 2022-12-10 RX ORDER — ONDANSETRON 4 MG/1
4 TABLET, ORALLY DISINTEGRATING ORAL EVERY 6 HOURS PRN
Status: DISCONTINUED | OUTPATIENT
Start: 2022-12-10 | End: 2022-12-14 | Stop reason: HOSPADM

## 2022-12-10 RX ORDER — ASPIRIN 81 MG/1
81 TABLET, CHEWABLE ORAL DAILY
Status: DISCONTINUED | OUTPATIENT
Start: 2022-12-11 | End: 2022-12-14 | Stop reason: HOSPADM

## 2022-12-10 RX ADMIN — MIRTAZAPINE 15 MG: 15 TABLET, FILM COATED ORAL at 21:18

## 2022-12-10 RX ADMIN — ACETAMINOPHEN 650 MG: 325 TABLET, FILM COATED ORAL at 16:54

## 2022-12-10 RX ADMIN — AMPICILLIN SODIUM AND SULBACTAM SODIUM 3 G: 2; 1 INJECTION, POWDER, FOR SOLUTION INTRAMUSCULAR; INTRAVENOUS at 17:00

## 2022-12-10 RX ADMIN — MECLIZINE HYDROCHLORIDE 25 MG: 25 TABLET ORAL at 10:00

## 2022-12-10 RX ADMIN — SODIUM CHLORIDE 100 ML: 900 INJECTION INTRAVENOUS at 10:48

## 2022-12-10 RX ADMIN — AMPICILLIN SODIUM AND SULBACTAM SODIUM 3 G: 2; 1 INJECTION, POWDER, FOR SOLUTION INTRAMUSCULAR; INTRAVENOUS at 21:16

## 2022-12-10 RX ADMIN — CEFTRIAXONE SODIUM 1 G: 1 INJECTION, POWDER, FOR SOLUTION INTRAMUSCULAR; INTRAVENOUS at 15:07

## 2022-12-10 RX ADMIN — SODIUM CHLORIDE: 9 INJECTION, SOLUTION INTRAVENOUS at 16:54

## 2022-12-10 RX ADMIN — LORAZEPAM 0.25 MG: 0.5 TABLET ORAL at 11:46

## 2022-12-10 RX ADMIN — IOPAMIDOL 75 ML: 755 INJECTION, SOLUTION INTRAVENOUS at 10:44

## 2022-12-10 RX ADMIN — ONDANSETRON 4 MG: 4 TABLET, ORALLY DISINTEGRATING ORAL at 16:56

## 2022-12-10 ASSESSMENT — ACTIVITIES OF DAILY LIVING (ADL)
ADLS_ACUITY_SCORE: 38
ADLS_ACUITY_SCORE: 35
ADLS_ACUITY_SCORE: 38
ADLS_ACUITY_SCORE: 39

## 2022-12-10 ASSESSMENT — ENCOUNTER SYMPTOMS
FEVER: 0
SHORTNESS OF BREATH: 0
COUGH: 0
CHILLS: 0
SORE THROAT: 0
DIZZINESS: 1
HEADACHES: 0
RHINORRHEA: 0
ARTHRALGIAS: 1

## 2022-12-10 NOTE — ED NOTES
Rico guajardo- pt reports still feeling dizzy and unable to get up/walk to the bathroom. RN alerted pharmacist that we will need PO ativan

## 2022-12-10 NOTE — ED NOTES
Bed: ED12  Expected date:   Expected time:   Means of arrival:   Comments:  HEMS 449 [id number] weak and dizzy, eta 908

## 2022-12-10 NOTE — PROGRESS NOTES
RECEIVING UNIT ED HANDOFF REVIEW    ED Nurse Handoff Report was reviewed by: Elijah Vallecillo RN on December 10, 2022 at 3:52 PM

## 2022-12-10 NOTE — ED NOTES
EDT attempted to ambulate patient. Patient able to sit up in bed, but reports feeling very dizzy. Patient laid back down, but was able to sit up several minutes later with improved dizziness. Patient unable to stand due to dizziness. MD riley.

## 2022-12-10 NOTE — ED TRIAGE NOTES
Pt comes from home where she felt dizzy yesterday and today. Pt fell on her buttock yesterday. Pt felt dizzy this morning and weak, no LOC. EMS gave 4mg zofran after pt had emesis in ambulance.

## 2022-12-10 NOTE — ED PROVIDER NOTES
History   Chief Complaint:  Generalized Weakness, Vomiting, and Dizziness       The history is provided by the patient and a relative.      Quiana Dunaway is a 87 year old female with history of multiple myeloma who presents with generalized weakness, vomiting, and dizziness. The patient's daughter reports that yesterday the patient fell on her buttocks on the floor besides her bed. She complained of room-spinning dizziness yesterday. She then got up and into bed. She reports that today she was in bed and then reached for her phone and again became dizzy. She states that she has been intermittently dizzy since the initial episode. The patient complains of buttocks pain. She denies chest pain, chest pressure, shortness of breath, fever, chills, rhinorrhea, sore throat, cough, or headache. She also denies any history of vertigo.    Review of Systems   Constitutional: Negative for chills and fever.   HENT: Negative for rhinorrhea and sore throat.    Respiratory: Negative for cough and shortness of breath.    Cardiovascular: Negative for chest pain.   Genitourinary: Positive for pelvic pain.   Musculoskeletal: Positive for arthralgias.   Neurological: Positive for dizziness. Negative for headaches.   All other systems reviewed and are negative.    Allergies:  Levaquin [Levofloxacin]    Medications:  Zovirax  Aspirin 81 mg  Beta blocker  Decadron  Pepcid  Lasix  Revlimid  Lisinopril  Tapazole  Myrbetriq  Remeron  Nitroglycerin  Zofran  Klor-con  Compazine    Past Medical History:     Hypertension  Multiple myeloma not having achieved remission  Pacemaker  ROBER  CKD stage 3, GFR 30-59 ml/min  Chronic diastolic CHF  Sick sinus syndrome  Overactive bladder  Neutropenia  Osteopenia  Shortness of breath  Vitamin B12 deficiency (non anemic)  Apathetic thyrotoxicosis  Toxic multinodul goiter  Secondary malignant neoplasm of bone     Past Surgical History:    Cataract eye surgery  Bone marrow biopsy, needle/trocar  Pacemaker  implant     Social History:  The patient presents to the ED with her daughter. They arrived via private vehicle.    Physical Exam     Patient Vitals for the past 24 hrs:   BP Temp Temp src Pulse Resp SpO2   12/10/22 1201 137/69 -- -- 63 17 98 %   12/10/22 1059 135/60 -- -- 61 17 97 %   12/10/22 0916 (!) 143/67 98.3  F (36.8  C) Temporal 67 20 95 %       Physical Exam  Constitutional: Alert, attentive, GCS 15  HENT:    Nose: Nose normal.    Mouth/Throat: Oropharynx is clear, mucous membranes are moist  Eyes: EOM are normal, anicteric, conjugate gaze  CV: regular rate and rhythm; no murmurs  Chest: Effort normal and breath sounds clear without wheezing or rales, symmetric bilaterally   GI:  non tender. No distension. No guarding or rebound.    MSK: No LE edema, no tenderness to palpation of BLE.  Neurological:   GCS 15; A/Ox3; Cranial nerves 2-12 intact;   5/5 strength throughout the upper and lower extremities;   sensation intact to light touch throughout the upper and lower extremities;    normal fine motor coordination intact bilaterally;   No disdiadochokinesia   Skin: Skin is warm and dry.    Emergency Department Course   ECG  ECG taken at 0934, ECG read at 0945  AV dual-paced rhythm with prolonged   No changes as compared to prior, dated 11/5/22.  Rate 62 bpm. TX interval 298 ms. QRS duration 152 ms. QT/QTc 492/499 ms. P-R-T axes 104 -50 70.     Imaging:  CTA Head Neck with Contrast   Final Result   IMPRESSION:    HEAD CT:   1.  No acute intracranial process.   2.  Opacified left ethmoid and frontal sinuses and subtotal opacification of the left maxillary sinus with an air-fluid level.       HEAD CTA:    1.  No significant stenosis, aneurysm, or high flow vascular malformation identified.   2.  Variant [place] anatomy as above.      NECK CTA:   1.  No significant stenosis of the bilateral internal carotid arteries based on NASCET criteria.            Head CT w/o contrast   Final Result   IMPRESSION:     HEAD CT:   1.  No acute intracranial process.   2.  Opacified left ethmoid and frontal sinuses and subtotal opacification of the left maxillary sinus with an air-fluid level.       HEAD CTA:    1.  No significant stenosis, aneurysm, or high flow vascular malformation identified.   2.  Variant [place] anatomy as above.      NECK CTA:   1.  No significant stenosis of the bilateral internal carotid arteries based on NASCET criteria.              Report per radiology    Laboratory:  Labs Ordered and Resulted from Time of ED Arrival to Time of ED Departure   INFLUENZA A/B & SARS-COV2 PCR MULTIPLEX - Abnormal       Result Value    Influenza A PCR Negative      Influenza B PCR Negative      RSV PCR Positive (*)     SARS CoV2 PCR Negative     COMPREHENSIVE METABOLIC PANEL - Abnormal    Sodium 138      Potassium 3.5      Chloride 107      Carbon Dioxide (CO2) 25      Anion Gap 6      Urea Nitrogen 19      Creatinine 0.69      Calcium 8.3 (*)     Glucose 110 (*)     Alkaline Phosphatase 83      AST 7      ALT 16      Protein Total 6.1 (*)     Albumin 2.7 (*)     Bilirubin Total 0.6      GFR Estimate 84     LIPASE - Abnormal    Lipase 72 (*)    CBC WITH PLATELETS AND DIFFERENTIAL - Abnormal    WBC Count 1.5 (*)     RBC Count 3.07 (*)     Hemoglobin 9.4 (*)     Hematocrit 28.8 (*)     MCV 94      MCH 30.6      MCHC 32.6      RDW 15.9 (*)     Platelet Count 96 (*)    DIFFERENTIAL - Abnormal    % Neutrophils 19      % Lymphocytes 59      % Monocytes 20      % Eosinophils 0      % Basophils 2      Absolute Neutrophils 0.3 (*)     Absolute Lymphocytes 0.9      Absolute Monocytes 0.3      Absolute Eosinophils 0.0      Absolute Basophils 0.0      RBC Morphology Confirmed RBC Indices      Platelet Assessment        Value: Automated Count Confirmed. Platelet morphology is normal.    Elliptocytes Moderate (*)     Smudge Cells Present (*)    NT PROBNP INPATIENT - Normal    N terminal Pro BNP Inpatient 209     TSH WITH FREE T4  REFLEX - Normal    TSH 1.26     ROUTINE UA WITH MICROSCOPIC        Emergency Department Course:       Reviewed:  I reviewed nursing notes, vitals, past medical history and Care Everywhere    Assessments:  917 I obtained history and examined the patient as noted above.   121 I rechecked the patient and explained findings.     Consults:  1253 I spoke on the phone with Dr. Lewis of the hospitalist service.    Interventions:  1000 Antivert, 25 mg, PO.  1146 Ativan, 0.25 mg, PO.    Disposition:  The patient was admitted to the hospital under the care of Dr. Lewis.     Impression & Plan     Medical Decision Makin-year-old woman past medical history significant for multiple myeloma on chemotherapy, HFpEF, sick sinus syndrome status post pacemaker presenting for vertigo.  She reported feeling vertiginous evening prior when standing however this resolves then while reaching over in bed later, her vertigo returned.  She denies any numbness or weakness, denies any vision changes, I have high suspicion for likely peripheral etiology given positional component however given her age certainly want to exclude posterior circulation stroke however given her pacemaker status (even though MRI safe by my review-patient did not come with her card due to it being the Sabbath) we are unable to get an MRI due to it being the weekend.  CT imaging of her head along with vessel imaging shows no obvious mass, bleed or critical narrowing.  With meclizine and Ativan, her symptoms were improved but did not resolve, unfortunately she was unable to ambulate.  Suspicion is highest for likely peripheral etiology however given ongoing symptoms, inability to get MRI due to her pacemaker status, will admit to medicine on observation status for continued treatment and interval MRI.    Diagnosis:    ICD-10-CM    1. Vertigo  R42       2. Multiple myeloma not having achieved remission (H)  C90.00       3. RSV infection  B33.8     asymptomatic         Seun Ulloa MD  [employer]  1:52 PM 12/10/22       Scribe Disclosure:  I, Kyle Megan, am serving as a scribe at 9:17 AM on 12/10/2022 to document services personally performed by Seun Ulloa MD based on my observations and the provider's statements to me.        Seun Ulloa MD  12/10/22 2874

## 2022-12-10 NOTE — ED NOTES
Pt's neighbor, Kodi Pond, updated. He wishes to be contacted for the family as the family is [employer] and they are unable to use telephone/electronics today. His name is Kodi Salty and contact number is [phone]

## 2022-12-10 NOTE — H&P
[facility]  History and Physical   Hospitalist  Krupa Lewis MD       Quiana Dunaway MRN# [id number]   YOB: 1935 Age: 87 year old      Date of Admission:  12/10/2022         Assessment and Plan:   Quiana Dunaway is a 87 year old female with history of multiple myeloma currently undergoing chemotherapy weekly on Mondays last chemo being last Monday today being Saturday, hypertension, history of sick sinus syndrome status post pacemaker placement, thyrotoxicosis presented to the emergency room with complaints of dizziness and vertigo.  She started having feelings of room spinning sensation since yesterday.  Yesterday she felt weak and fell on her bottom on the floor beside her bed.  And she was able to get up and getting to back into bed.  Today she was in her bed and suddenly sat up and moved her head and again became dizzy since then she has been intermittently dizzy and is having hard time walking.  She otherwise denies any headache.  She felt nauseous and vomited in the ambulance on the way to the emergency room.  No focal weakness no tingling or numbness no speech changes.  CT head and CTA head and neck were negative except acute sinusitis in the multiple sinuses left ethmoid frontal and a complete opacification of the left maxillary sinus with an air-fluid level    Acute vertigo with the dizziness could be BPPV versus acute stroke  Mechanical fall secondary to above;   admit her to the hospital under observation status  As needed meclizine to help with symptoms of vertigo and dizziness  MRI brain will be ordered once pacemaker is evaluated and compatible with MRI which will be done on Monday most likely  PT evaluation for possible BPPV for vestibular rehab therapy  Continue baby aspirin for now    Acute left frontal maxillary and ethmoid sinusitis  Acute RSV infection;  Will treat her with IV unasyn  especially with her symptoms of dizziness  With RSV she is mostly  asymptomatic continue symptomatic treatment    History of multiple myeloma currently undergoing weekly chemotherapy on Mondays;  Pancytopenia most likely secondary to multiple myeloma on chemotherapy;  Continue her as needed antiemetics  And symptom treatment as needed    History of hypertension;  Hold lisinopril    History of depression;  Continue Remeron    DVT prophylaxis; ambulation    Diet; regular  CODE STATUS full code    Admit as observation    Krupa Lewis MD   Page [phone](7AM-6PM)           Code Status:   Full Code         Primary Care Physician:   César Chung [phone]         Chief Complaint:   Vertigo and dizziness    History is obtained from the patient, her daughter-in-law who is at bedside and discussion with ED physician and review of medical records         History of Present Illness:   Quiana Dunaway is a 87 year old female with history of multiple myeloma currently undergoing chemotherapy weekly on Mondays last chemo being last Monday today being Saturday, hypertension, history of sick sinus syndrome status post pacemaker placement, thyrotoxicosis presented to the emergency room with complaints of dizziness and vertigo.  She started having feelings of room spinning sensation since yesterday.  Yesterday she felt weak and fell on her bottom on the floor beside her bed.  And she was able to get up and getting to back into bed.  Today she was in her bed and suddenly sat up and moved her head and again became dizzy since then she has been intermittently dizzy and is having hard time walking.  She otherwise denies any headache.  She felt nauseous and vomited in the ambulance on the way to the emergency room.  No focal weakness no tingling or numbness no speech changes.  Denies any fevers or chills or cough or upper respiratory symptoms recently.  Denies any chest pain shortness of breath.  In the ED CT head without contrast was done and it showed no acute intracranial process.   Opacification of the left ethmoid and frontal sinuses and subtotal opacification of the left maxillary sinus with an air-fluid level seen.  CTA head and neck were negative.  Patient was given meclizine and Ativan in the emergency room she was still unable to ambulate due to ongoing symptoms of dizziness awake request to hospitalization was made.  Plan is to do an MRI of the brain to rule out acute stroke but with her pacemaker she needs to be evaluated by pacemaker rep and to make sure it is compatible with MRI.  RSV testing returned positive in the ED             Past Medical History:     Past Medical History:   Diagnosis Date     Anxiety      HTN (hypertension)      Multiple myeloma not having achieved remission (H) 7/18/2017     Pacemaker     Placed due to symptomatic second degree AV Block     Pancytopenia (H) 6/28/2017     Paroxysmal atrial fibrillation (H)                Past Surgical History:     Past Surgical History:   Procedure Laterality Date     ------------OTHER-------------      cataract eye surgery     BONE MARROW BIOPSY, BONE SPECIMEN, NEEDLE/TROCAR N/A 7/12/2017    Procedure: BIOPSY BONE MARROW;  BONE MARROW BIOPSY ;  Surgeon: Grace Vela MD;  Location:  GI     IMPLANT PACEMAKER  03/2017    AV block              Home Medications:     Prior to Admission medications    Medication Sig Last Dose Taking? Auth Provider Long Term End Date   acyclovir (ZOVIRAX) 400 MG tablet Take 1 tablet (400 mg) by mouth 2 times daily Viral Prophylaxis. 12/10/2022 at AM Yes Alex Parry MD Yes    aspirin 81 MG chewable tablet Take 81 mg by mouth daily  12/10/2022 at AM Yes Reported, Patient     Daratumumab (DARZALEX IV) Q weekly injection, given on Mondays at cancer infusion center 12/5/2022 Yes Unknown, Entered By History     dexamethasone (DECADRON) 4 MG tablet Take 1 tablet (4 mg) by mouth once a week  Patient taking differently: Take 4 mg by mouth once a week ON SUNDAYS 12/4/2022 Yes Sohan  MD Alex Yes    famotidine (PEPCID) 40 MG tablet Take 1 tablet (40 mg) by mouth 2 times daily 12/10/2022 at AM Yes César Chung MD     furosemide (LASIX) 20 MG tablet Take 0.5 tablets (10 mg) by mouth daily as needed (For BP greater than 160/90) PRN at PRN Yes César Chung MD Yes    LENalidomide (REVLIMID) 10 MG CAPS capsule Take 1 capsule (10 mg) by mouth daily for 14 days . Then take 7 days off. Take at the same time each day. Do not open, break or chew the capsule. Swallow whole, with water. just completed off week at Mon (12/12) will begin new cycle Yes Alex Parry MD Yes 12/19/22   lisinopril (ZESTRIL) 5 MG tablet Take 1 tablet (5 mg) by mouth daily 12/10/2022 at AM Yes Bria Beltrán PA-C Yes    Loperamide HCl (IMODIUM A-D PO) Take 1 tablet by mouth as needed  PRN at PRN Yes Reported, Patient     LORazepam (ATIVAN) 0.5 MG tablet Take 1 tablet (0.5 mg) by mouth every 6 hours as needed for anxiety PRN at PRN Yes Juaquin Hammond APRN CNP     methimazole (TAPAZOLE) 5 MG tablet Take 0.5 tablets (2.5 mg) by mouth daily 12/10/2022 at AM Yes César Chung MD Yes    mirabegron (MYRBETRIQ) 50 MG 24 hr tablet Take 1 tablet (50 mg) by mouth daily Last refill needs  To be seen 12/10/2022 at AM Yes César Chung MD     mirtazapine (REMERON) 7.5 MG tablet Take 2 tablets (15 mg) by mouth At Bedtime 12/9/2022 at HS Yes Alex Parry MD Yes    nitroglycerin (NITROSTAT) 0.4 MG sublingual tablet For chest pain place 1 tablet under the tongue every 5 minutes for 3 doses. If symptoms persist 5 minutes after 1st dose call 911. PRN at PRN Yes Jacqueline Webb, MARIUSZ CNP Yes    ondansetron (ZOFRAN) 8 MG tablet Take 1 tablet (8 mg) by mouth every 8 hours as needed for nausea PRN at PRN Yes César Chung MD     potassium chloride ER (KLOR-CON M) 10 MEQ CR tablet Take 1 tablet (10 mEq) by mouth daily 12/10/2022 Yes Alex Parry MD     prochlorperazine (COMPAZINE) 10 MG tablet Take 1  tablet (10 mg) by mouth every 6 hours as needed (Nausea/Vomiting) PRN at PRN Yes César Chung MD     BETA BLOCKER NOT PRESCRIBED (INTENTIONAL) Please choose reason not prescribed from choices below.   Rossi Gaxiola MD Yes    cyanocobalamin (VITAMIN B-12) 1000 MCG tablet Take 1 tablet (1,000 mcg) by mouth daily  Patient not taking: Reported on 12/10/2022 Not Taking  César Chung MD     dexamethasone (DECADRON) 4 MG tablet Take 1 tablet (4 mg) by mouth once a week   Alex Parry MD Yes    LENalidomide (REVLIMID) 10 MG CAPS capsule Take 1 capsule (10 mg) by mouth daily for 14 days Take on Days 1 through 14 of 21-day cycle.   Alex Parry MD Yes 10/31/22   triamcinolone (KENALOG) 0.1 % paste Apply to tongue lesion twice daily for one week.  Patient not taking: Reported on 12/10/2022 Not Taking  Alex Parry MD              Allergies:     Allergies   Allergen Reactions     Levaquin [Levofloxacin] Other (See Comments)     Tingling in feet after 1 dose on 8/7/19              Social History:     Social History     Tobacco Use     Smoking status: Never     Smokeless tobacco: Never   Substance Use Topics     Alcohol use: No     Alcohol/week: 0.0 standard drinks     She is pretty active at baseline.  Denies any smoking or tobacco use or alcohol use.  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 rate 26, SpO2 96 %, not currently breastfeeding.  0 lbs 0 oz    Constitutional: Alert, awake not in any distress, elderly female lying comfortably in bed not in acute distress   Psych: Mood and affect are normal    Neuro: Cranial  nerves 2-12 are intact, muscle power 5/5, no focal weakness   Eyes: No conjunctival injection, No scleral icterus, PERRLA   ENT: Ear, nose , throat are normal. Mucous membranes moist         Cardiovascular:  Normal rate rhythm regular, no murmurs rubs or gallops   Lungs:  Clear to auscultation bilaterally, no rales rhonchi or wheezing   Abdomen:  Soft, nontender, nondistended, no hepatosplenomegaly   Skin:  Warm and dry   Musculoskeletal:  No clubbing cyanosis or edema   Other:           Data:   All new lab and imaging data was reviewed.   Recent Labs   Lab Test 12/10/22  0924 12/02/22  1121   WBC 1.5* 3.8*   HGB 9.4* 9.8*   MCV 94 97   PLT 96* 123*      Results for orders placed or performed during the hospital encounter of 12/10/22   Head CT w/o contrast    Narrative    EXAM: CT HEAD W/O CONTRAST, CTA HEAD NECK W CONTRAST  LOCATION: [facility]  DATE/TIME: 12/10/2022 11:06 AM    INDICATION: Vertigo.  COMPARISON: None.  CONTRAST: 75 mL Isovue 370  TECHNIQUE: Head and neck CT angiogram with IV contrast. Noncontrast head CT followed by axial helical CT images of the head and neck vessels obtained during the arterial phase of intravenous contrast administration. Axial 2D reconstructed images and   multiplanar 3D MIP reconstructed images of the head and neck vessels were performed by the technologist. Dose reduction techniques were used. All stenosis measurements made according to NASCET criteria unless otherwise specified.    FINDINGS:   NONCONTRAST HEAD CT:   INTRACRANIAL CONTENTS: No intracranial hemorrhage, extraaxial collection, or mass effect.  No CT evidence of acute infarct. Normal parenchymal attenuation. Normal ventricles and sulci.     VISUALIZED ORBITS/SINUSES/MASTOIDS: Prior bilateral cataract surgery. Visualized portions of the orbits are otherwise unremarkable. Circumferential mucosal thickening and air-fluid level in the left maxillary sinus. Complete opacification of the left    ethmoid and frontal sinuses. Mucosal thickening in the left sphenoid sinus locule. No middle ear or mastoid effusion.    BONES/SOFT TISSUES: No acute abnormality.    HEAD CTA:  ANTERIOR CIRCULATION: No stenosis/occlusion, aneurysm, or high flow vascular malformation. Standard [place] anatomy.    POSTERIOR CIRCULATION: No stenosis/occlusion, aneurysm, or high flow vascular malformation. Dominant left and smaller right vertebral artery contribute to a normal basilar artery.     DURAL VENOUS SINUSES: Expected enhancement of the major dural venous sinuses.    NECK CTA:  RIGHT CAROTID: No measurable stenosis or dissection.    LEFT CAROTID: No measurable stenosis or dissection.    VERTEBRAL ARTERIES: No focal stenosis or dissection. Dominant left and smaller right vertebral arteries.    AORTIC ARCH: Classic aortic arch anatomy with no significant stenosis at the origin of the great vessels.    NONVASCULAR STRUCTURES: Heterogeneous nodule in the left thyroid gland measures 2.4 cm.      Impression    IMPRESSION:   HEAD CT:  1.  No acute intracranial process.  2.  Opacified left ethmoid and frontal sinuses and subtotal opacification of the left maxillary sinus with an air-fluid level.     HEAD CTA:   1.  No significant stenosis, aneurysm, or high flow vascular malformation identified.  2.  Variant [place] anatomy as above.    NECK CTA:  1.  No significant stenosis of the bilateral internal carotid arteries based on NASCET criteria.       CTA Head Neck with Contrast    Narrative    EXAM: CT HEAD W/O CONTRAST, CTA HEAD NECK W CONTRAST  LOCATION: [facility]  DATE/TIME: 12/10/2022 11:06 AM    INDICATION: Vertigo.  COMPARISON: None.  CONTRAST: 75 mL Isovue 370  TECHNIQUE: Head and neck CT angiogram with IV contrast. Noncontrast head CT followed by axial helical CT images of the head and neck vessels obtained during the arterial phase of intravenous contrast administration. Axial 2D  reconstructed images and   multiplanar 3D MIP reconstructed images of the head and neck vessels were performed by the technologist. Dose reduction techniques were used. All stenosis measurements made according to NASCET criteria unless otherwise specified.    FINDINGS:   NONCONTRAST HEAD CT:   INTRACRANIAL CONTENTS: No intracranial hemorrhage, extraaxial collection, or mass effect.  No CT evidence of acute infarct. Normal parenchymal attenuation. Normal ventricles and sulci.     VISUALIZED ORBITS/SINUSES/MASTOIDS: Prior bilateral cataract surgery. Visualized portions of the orbits are otherwise unremarkable. Circumferential mucosal thickening and air-fluid level in the left maxillary sinus. Complete opacification of the left   ethmoid and frontal sinuses. Mucosal thickening in the left sphenoid sinus locule. No middle ear or mastoid effusion.    BONES/SOFT TISSUES: No acute abnormality.    HEAD CTA:  ANTERIOR CIRCULATION: No stenosis/occlusion, aneurysm, or high flow vascular malformation. Standard [place] anatomy.    POSTERIOR CIRCULATION: No stenosis/occlusion, aneurysm, or high flow vascular malformation. Dominant left and smaller right vertebral artery contribute to a normal basilar artery.     DURAL VENOUS SINUSES: Expected enhancement of the major dural venous sinuses.    NECK CTA:  RIGHT CAROTID: No measurable stenosis or dissection.    LEFT CAROTID: No measurable stenosis or dissection.    VERTEBRAL ARTERIES: No focal stenosis or dissection. Dominant left and smaller right vertebral arteries.    AORTIC ARCH: Classic aortic arch anatomy with no significant stenosis at the origin of the great vessels.    NONVASCULAR STRUCTURES: Heterogeneous nodule in the left thyroid gland measures 2.4 cm.      Impression    IMPRESSION:   HEAD CT:  1.  No acute intracranial process.  2.  Opacified left ethmoid and frontal sinuses and subtotal opacification of the left maxillary sinus with an air-fluid level.     HEAD  CTA:   1.  No significant stenosis, aneurysm, or high flow vascular malformation identified.  2.  Variant [place] anatomy as above.    NECK CTA:  1.  No significant stenosis of the bilateral internal carotid arteries based on NASCET criteria.         *Note: Due to a large number of results and/or encounters for the requested time period, some results have not been displayed. A complete set of results can be found in Results Review.     Twelve-lead EKG is reviewed and showed atrial ventricular paced rhythm otherwise no acute findings  Recent Labs   Lab Test 12/10/22  0924 12/02/22  1121    141   POTASSIUM 3.5 3.7   CHLORIDE 107 108   CO2 25 28   BUN 19 24   CR 0.69 0.67   ANIONGAP 6 5   HUMBERTO 8.3* 8.6   * 76     Recent Labs   Lab Test 05/25/20  0904 05/25/20  0430 05/25/20  0039   TROPI 0.041 0.051* 0.068*

## 2022-12-10 NOTE — ED NOTES
"[facility]  ED Nurse Handoff Report    ED Chief complaint: Generalized Weakness, Vomiting, and Dizziness      ED Diagnosis:   Final diagnoses:   None       Code Status: Hospital MD to address     Allergies:   Allergies   Allergen Reactions    Levaquin [Levofloxacin] Other (See Comments)     Tingling in feet after 1 dose on 8/7/19         Patient Story: Pt presents with family for complaint of dizziness. Pt reports vertigo began yesterday and she sustained a fall and fell onto her buttock yesterday. Dizziness continues and pt now unable to walk due to it. Here, pt given ativert and ativan and still unable to sit/stand/walk due to dizziness. CT imaging of head normal. Pt does have MM and getting chemo but reports she doesn't want MRI or  further diagnostics as \"she doesn't want to know what it says\" Family at bedside. They are currently celebrating Sabbath so they cannot use electronics so they are using family/neighbors to help communication   Focused Assessment:  a/o x4, dizziness, vss, normally up independent    Treatments and/or interventions provided: blood work, ct, ekg  Patient's response to treatments and/or interventions: tolerating okay, dizziness unresolved after PO meds     To be done/followed up on inpatient unit:  inpt orders    Does this patient have any cognitive concerns?:  none    Activity level - Baseline/Home:  Independent  Activity Level - Current:   Total Care    Patient's Preferred language: English   Needed?: No    Isolation: RSV  Infection: Other   Patient tested for COVID 19 prior to admission: YES  Bariatric?: No    Vital Signs:   Vitals:    12/10/22 0916 12/10/22 1059 12/10/22 1201   BP: (!) 143/67 135/60 137/69   Pulse: 67 61 63   Resp: 20 17 17   Temp: 98.3  F (36.8  C)     TempSrc: Temporal     SpO2: 95% 97% 98%       Cardiac Rhythm:     Was the PSS-3 completed:   Yes  What interventions are required if any?               Family Comments: family present  OBS " brochure/video discussed/provided to patient/family: N/A              Name of person given brochure if not patient: n/a              Relationship to patient: n/a    For the majority of the shift this patient's behavior was Green.   Behavioral interventions performed were none.    ED NURSE PHONE NUMBER: *[phone]

## 2022-12-10 NOTE — PHARMACY-ADMISSION MEDICATION HISTORY
Pharmacy Medication History  Admission medication history interview status for the 12/10/2022  admission is complete. See EPIC admission navigator for prior to admission medications     Location of Interview: Patient room  Medication history sources: Patient, Patient's family/friend (daughter) and [employer]    Significant changes made to the medication list:  - added Darzalex weekly IV infusion     In the past week, patient estimated taking medication this percent of the time: greater than 90%    Additional medication history information:   - pt confirmed dose increase of mirtazapine (from 7.5 mg daily per [employer] to 15 mg daily)  - pt reports doesn't take cyanocobalamin 1000 mcg tablet    Medication reconciliation completed by provider prior to medication history? No    Time spent in this activity: 20 minutes    Prior to Admission medications    Medication Sig Last Dose Taking? Auth Provider Long Term End Date   acyclovir (ZOVIRAX) 400 MG tablet Take 1 tablet (400 mg) by mouth 2 times daily Viral Prophylaxis. 12/10/2022 at AM Yes Alex Parry MD Yes    aspirin 81 MG chewable tablet Take 81 mg by mouth daily  12/10/2022 at AM Yes Reported, Patient     Daratumumab (DARZALEX IV) Q weekly injection, given on Mondays at cancer infusion center 12/5/2022 Yes Unknown, Entered By History     dexamethasone (DECADRON) 4 MG tablet Take 1 tablet (4 mg) by mouth once a week  Patient taking differently: Take 4 mg by mouth once a week ON SUNDAYS 12/4/2022 Yes Alex Parry MD Yes    famotidine (PEPCID) 40 MG tablet Take 1 tablet (40 mg) by mouth 2 times daily 12/10/2022 at AM Yes César Chung MD     furosemide (LASIX) 20 MG tablet Take 0.5 tablets (10 mg) by mouth daily as needed (For BP greater than 160/90) PRN at PRN Yes César Chung MD Yes    LENalidomide (REVLIMID) 10 MG CAPS capsule Take 1 capsule (10 mg) by mouth daily for 14 days . Then take 7 days off. Take at the same time each day. Do not open, break  or chew the capsule. Swallow whole, with water. just completed off week - Mon (12/12) will begin new on cycle Yes Alex Parry MD Yes 12/19/22   lisinopril (ZESTRIL) 5 MG tablet Take 1 tablet (5 mg) by mouth daily 12/10/2022 at AM Yes Bria Beltrán PA-C Yes    Loperamide HCl (IMODIUM A-D PO) Take 1 tablet by mouth as needed  PRN at PRN Yes Reported, Patient     LORazepam (ATIVAN) 0.5 MG tablet Take 1 tablet (0.5 mg) by mouth every 6 hours as needed for anxiety PRN at PRN Yes Juaquin Hammond APRN CNP     methimazole (TAPAZOLE) 5 MG tablet Take 0.5 tablets (2.5 mg) by mouth daily 12/10/2022 at AM Yes César Chung MD Yes    mirabegron (MYRBETRIQ) 50 MG 24 hr tablet Take 1 tablet (50 mg) by mouth daily Last refill needs  To be seen 12/10/2022 at AM Yes César Chung MD     mirtazapine (REMERON) 7.5 MG tablet Take 2 tablets (15 mg) by mouth At Bedtime 12/9/2022 at HS Yes Alex Parry MD Yes    nitroglycerin (NITROSTAT) 0.4 MG sublingual tablet For chest pain place 1 tablet under the tongue every 5 minutes for 3 doses. If symptoms persist 5 minutes after 1st dose call 911. PRN at PRN Yes Jacqueline Webb, MARIUSZ CNP Yes    ondansetron (ZOFRAN) 8 MG tablet Take 1 tablet (8 mg) by mouth every 8 hours as needed for nausea PRN at PRN Yes César Chung MD     potassium chloride ER (KLOR-CON M) 10 MEQ CR tablet Take 1 tablet (10 mEq) by mouth daily 12/10/2022 Yes Alex Parry MD     prochlorperazine (COMPAZINE) 10 MG tablet Take 1 tablet (10 mg) by mouth every 6 hours as needed (Nausea/Vomiting) PRN at PRN Yes César Chung MD     BETA BLOCKER NOT PRESCRIBED (INTENTIONAL) Please choose reason not prescribed from choices below.   Rossi Gaxiola MD Yes    cyanocobalamin (VITAMIN B-12) 1000 MCG tablet Take 1 tablet (1,000 mcg) by mouth daily  Patient not taking: Reported on 12/10/2022 Not Taking  César Chung MD     dexamethasone (DECADRON) 4 MG tablet Take 1 tablet (4 mg) by  "mouth once a week Duplicate \"house\" entry  Alex Parry MD Yes    LENalidomide (REVLIMID) 10 MG CAPS capsule Take 1 capsule (10 mg) by mouth daily for 14 days Take on Days 1 through 14 of 21-day cycle. Duplicate \"house\" entry  Alex Parry MD Yes 10/31/22   triamcinolone (KENALOG) 0.1 % paste Apply to tongue lesion twice daily for one week.  Patient not taking: Reported on 12/10/2022 Not Taking  Alex Parry MD         The information provided in this note is only as accurate as the sources available at the time of update(s)   "

## 2022-12-11 ENCOUNTER — APPOINTMENT (OUTPATIENT)
Dept: PHYSICAL THERAPY | Facility: CLINIC | Age: 87
DRG: 149 | End: 2022-12-11
Attending: INTERNAL MEDICINE
Payer: MEDICARE

## 2022-12-11 ENCOUNTER — HEALTH MAINTENANCE LETTER (OUTPATIENT)
Age: 87
End: 2022-12-11

## 2022-12-11 PROBLEM — B33.8 RSV INFECTION: Status: ACTIVE | Noted: 2022-12-11

## 2022-12-11 PROBLEM — R42 VERTIGO: Status: ACTIVE | Noted: 2022-12-11

## 2022-12-11 LAB
ANION GAP SERPL CALCULATED.3IONS-SCNC: 6 MMOL/L (ref 3–14)
BUN SERPL-MCNC: 14 MG/DL (ref 7–30)
CALCIUM SERPL-MCNC: 8.4 MG/DL (ref 8.5–10.1)
CHLORIDE BLD-SCNC: 109 MMOL/L (ref 94–109)
CO2 SERPL-SCNC: 26 MMOL/L (ref 20–32)
CREAT SERPL-MCNC: 0.66 MG/DL (ref 0.52–1.04)
ERYTHROCYTE [DISTWIDTH] IN BLOOD BY AUTOMATED COUNT: 15.9 % (ref 10–15)
GFR SERPL CREATININE-BSD FRML MDRD: 85 ML/MIN/1.73M2
GLUCOSE BLD-MCNC: 87 MG/DL (ref 70–99)
HCT VFR BLD AUTO: 26.6 % (ref 35–47)
HGB BLD-MCNC: 8.7 G/DL (ref 11.7–15.7)
MCH RBC QN AUTO: 30.5 PG (ref 26.5–33)
MCHC RBC AUTO-ENTMCNC: 32.7 G/DL (ref 31.5–36.5)
MCV RBC AUTO: 93 FL (ref 78–100)
PLATELET # BLD AUTO: 104 10E3/UL (ref 150–450)
POTASSIUM BLD-SCNC: 3.4 MMOL/L (ref 3.4–5.3)
RBC # BLD AUTO: 2.85 10E6/UL (ref 3.8–5.2)
SODIUM SERPL-SCNC: 141 MMOL/L (ref 133–144)
WBC # BLD AUTO: 1.8 10E3/UL (ref 4–11)

## 2022-12-11 PROCEDURE — 96375 TX/PRO/DX INJ NEW DRUG ADDON: CPT

## 2022-12-11 PROCEDURE — 120N000001 HC R&B MED SURG/OB

## 2022-12-11 PROCEDURE — G0378 HOSPITAL OBSERVATION PER HR: HCPCS

## 2022-12-11 PROCEDURE — 99232 SBSQ HOSP IP/OBS MODERATE 35: CPT | Performed by: INTERNAL MEDICINE

## 2022-12-11 PROCEDURE — 82310 ASSAY OF CALCIUM: CPT | Performed by: INTERNAL MEDICINE

## 2022-12-11 PROCEDURE — 36415 COLL VENOUS BLD VENIPUNCTURE: CPT | Performed by: INTERNAL MEDICINE

## 2022-12-11 PROCEDURE — 95992 CANALITH REPOSITIONING PROC: CPT | Mod: GP

## 2022-12-11 PROCEDURE — 250N000011 HC RX IP 250 OP 636: Performed by: INTERNAL MEDICINE

## 2022-12-11 PROCEDURE — 250N000013 HC RX MED GY IP 250 OP 250 PS 637: Performed by: INTERNAL MEDICINE

## 2022-12-11 PROCEDURE — 97161 PT EVAL LOW COMPLEX 20 MIN: CPT | Mod: GP

## 2022-12-11 PROCEDURE — 96366 THER/PROPH/DIAG IV INF ADDON: CPT

## 2022-12-11 PROCEDURE — 85027 COMPLETE CBC AUTOMATED: CPT | Performed by: INTERNAL MEDICINE

## 2022-12-11 RX ADMIN — AMOXICILLIN AND CLAVULANATE POTASSIUM 1 TABLET: 875; 125 TABLET, FILM COATED ORAL at 20:50

## 2022-12-11 RX ADMIN — ASPIRIN 81 MG CHEWABLE TABLET 81 MG: 81 TABLET CHEWABLE at 08:11

## 2022-12-11 RX ADMIN — DEXAMETHASONE 4 MG: 4 TABLET ORAL at 08:11

## 2022-12-11 RX ADMIN — PROCHLORPERAZINE EDISYLATE 5 MG: 5 INJECTION INTRAMUSCULAR; INTRAVENOUS at 10:23

## 2022-12-11 RX ADMIN — AMPICILLIN SODIUM AND SULBACTAM SODIUM 3 G: 2; 1 INJECTION, POWDER, FOR SOLUTION INTRAMUSCULAR; INTRAVENOUS at 09:28

## 2022-12-11 RX ADMIN — LORAZEPAM 0.5 MG: 0.5 TABLET ORAL at 14:06

## 2022-12-11 RX ADMIN — METHIMAZOLE 2.5 MG: 5 TABLET ORAL at 08:11

## 2022-12-11 RX ADMIN — AMPICILLIN SODIUM AND SULBACTAM SODIUM 3 G: 2; 1 INJECTION, POWDER, FOR SOLUTION INTRAMUSCULAR; INTRAVENOUS at 04:09

## 2022-12-11 RX ADMIN — MIRTAZAPINE 15 MG: 15 TABLET, FILM COATED ORAL at 21:08

## 2022-12-11 RX ADMIN — ONDANSETRON 4 MG: 4 TABLET, ORALLY DISINTEGRATING ORAL at 09:28

## 2022-12-11 RX ADMIN — FAMOTIDINE 20 MG: 20 TABLET ORAL at 08:11

## 2022-12-11 ASSESSMENT — ACTIVITIES OF DAILY LIVING (ADL)
ADLS_ACUITY_SCORE: 42
ADLS_ACUITY_SCORE: 44
ADLS_ACUITY_SCORE: 44
ADLS_ACUITY_SCORE: 43
ADLS_ACUITY_SCORE: 39
ADLS_ACUITY_SCORE: 44
ADLS_ACUITY_SCORE: 44
ADLS_ACUITY_SCORE: 42
ADLS_ACUITY_SCORE: 44
ADLS_ACUITY_SCORE: 42
ADLS_ACUITY_SCORE: 43
ADLS_ACUITY_SCORE: 42

## 2022-12-11 NOTE — PLAN OF CARE
Observation Goals:     -diagnostic tests and consults completed and resulted - partially met (pt recommends TCU)  -vital signs normal or at patient baseline - met

## 2022-12-11 NOTE — ACP (ADVANCE CARE PLANNING)
Goal Outcome Evaluation:     Orientation/Cognitive: WDL  Observation Goals (Met/ Not Met): partially met  Mobility Level/Assist Equipment: Assist of 1-2 with gait belt/walker to bedside commode  Fall Risk (Y/N): Yes  Behavior Concerns: None  Pain Management: Pt denied pain  Tele/VS/O2: AVSS; RA; no Tele  ABNL Lab/BG: WBC 1.5; Hgb 8.7; plt 104;   Diet: Regular  Bowel/Bladder: Voiding; no bm today  Skin Concerns: None  Drains/Devices: Purewick left in today per pt request-(dizziness/nausea)  Tests/Procedures for next shift: MRI still due; will probably be done Monday secondary to pacemaker.  Anticipated DC date & active delays: PT recommending TCU  Patient Stated Goal for Today: to stop nausea and dizziness     NS at 75 ml/hr stopped when 1L completed

## 2022-12-11 NOTE — PROGRESS NOTES
-diagnostic tests and consults completed and resulted No  -vital signs normal or at patient baseline Yes  Nurse to notify provider when observation goals have been met and patient is ready for discharge

## 2022-12-11 NOTE — PLAN OF CARE
Goal Outcome Evaluation:         Orientation/Cognitive: WDL  Observation Goals (Met/ Not Met): Not met  Mobility Level/Assist Equipment: Assist of 1-2 with gait belt/walker to bedside commode  Fall Risk (Y/N): Yes  Behavior Concerns: None  Pain Management: Pt denied pain  Tele/VS/O2: AVSS; RA; no Tele  ABNL Lab/BG: WBC 1.5; Hgb 9.4; plt 76;   Diet: Regular  Bowel/Bladder: Voiding; no BM overnight  Skin Concerns: None  Drains/Devices: Purewick secondary to dizziness  Tests/Procedures for next shift: PT; MRI still due; will probably be done Monday secondary to pacemaker.  Anticipated DC date & active delays: To be decided  Patient Stated Goal for Today: None given    NS at 75 ml/hr; to be stopped after 1 liter.

## 2022-12-11 NOTE — CONSULTS
[facility]    Neurology Consultation    Quiana Dunaway MRN# [id number]   YOB: 1935 Age: 87 year old    Code Status:Full Code   Date of Admission: 12/10/2022  Date of Consult:12/11/2022                                                                                       Assessment and Plan:                                         #.  Acute vertigo physical therapy reported findings suggestive of BPPV.  However in the setting of acute RSV infection and sinusitis, those could be responsible as well.  -- Agree with plan for MRI scan in the interest of thoroughness to exclude the remote possibility of stroke.  Because of her pacemaker this will require special monitoring.      #. PT/OT/Speech  -- Continue therapies  Determine discharge plan based on physical therapy reports and family's ability to provide supervision Versus TCU    Dr. Irma Chapman will follow this patient from the general neurology service tomorrow    ----------------------------------------------------------------------------------  ----------------------------------------------------------------------------------  Reason for consult: as above       Chief Complaint:   Dizziness  History is obtained from the patient / chart       History of Present Illness:   This patient is a 87 year old female who presents with hx per hospitalist service:     history of multiple myeloma currently undergoing chemotherapy weekly on Mondays last chemo being last Monday today being Saturday, hypertension, history of sick sinus syndrome status post pacemaker placement, thyrotoxicosis presented to the emergency room with complaints of dizziness and vertigo.  She started having feelings of room spinning sensation since yesterday.  Yesterday she felt weak and fell on her bottom on the floor beside her bed.  And she was able to get up and getting to back into bed.  Today she was in her bed and suddenly sat up and moved her head and again  became dizzy since then she has been intermittently dizzy and is having hard time walking.  She otherwise denies any headache.  She felt nauseous and vomited in the ambulance on the way to the emergency room.  No focal weakness no tingling or numbness no speech changes.  CT head and CTA head and neck were negative except acute sinusitis in the multiple sinuses left ethmoid frontal and a complete opacification of the left maxillary sinus with an air-fluid level     Acute vertigo with the dizziness could be BPPV versus acute stroke  Mechanical fall secondary to above;   admit her to the hospital under observation status  As needed meclizine to help with symptoms of vertigo and dizziness  MRI brain will be ordered once pacemaker is evaluated and compatible with MRI which will be done on Monday most likely  PT evaluation for possible BPPV for vestibular rehab therapy  Continue baby aspirin for now  Acute left frontal maxillary and ethmoid sinusitis  Acute RSV infection;  Will treat her with IV unasyn  especially with her symptoms of dizziness. Switched to oral augmentin today   With RSV she is mostly asymptomatic continue symptomatic treatment    Overnight, she is about the same.  She reports that anytime she sits up she will get the vertigo.  There has been no hearing changes no tinnitus.  No visual changes, no diplopia.  No arm or leg weakness or numbness.    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  She otherwise lives alone with close family involvement.  No prior history of vertigo       Past Medical History:     Past Medical History:   Diagnosis Date     Anxiety      HTN (hypertension)      Multiple myeloma not having achieved remission (H) 7/18/2017     Pacemaker     Placed due to symptomatic second degree AV Block     Pancytopenia (H)  6/28/2017     Paroxysmal atrial fibrillation (H)          Past Surgical History:     Past Surgical History:   Procedure Laterality Date     ------------OTHER-------------      cataract eye surgery     BONE MARROW BIOPSY, BONE SPECIMEN, NEEDLE/TROCAR N/A 7/12/2017    Procedure: BIOPSY BONE MARROW;  BONE MARROW BIOPSY ;  Surgeon: Grace Vela MD;  Location:  GI     IMPLANT PACEMAKER  03/2017    AV block          Social History:     Social History     Socioeconomic History     Marital status:    Tobacco Use     Smoking status: Never     Smokeless tobacco: Never   Substance and Sexual Activity     Alcohol use: No     Alcohol/week: 0.0 standard drinks     Drug use: No     Sexual activity: Not Currently     Partners: Female     Patient denies smoking, no significant alcohol intake, denies illicit drugs use       Family History:     Family History   Problem Relation Age of Onset     Unknown/Adopted Mother      Unknown/Adopted Father      Reviewed and not felt to be contributory.        Home Medications:     Prior to Admission Medications   Prescriptions Last Dose Informant Patient Reported? Taking?   BETA BLOCKER NOT PRESCRIBED (INTENTIONAL)   No No   Sig: Please choose reason not prescribed from choices below.   Daratumumab (DARZALEX IV) 12/5/2022 Daughter Yes Yes   Sig: Q weekly injection, given on Mondays at cancer infusion center   LENalidomide (REVLIMID) 10 MG CAPS capsule   No No   Sig: Take 1 capsule (10 mg) by mouth daily for 14 days Take on Days 1 through 14 of 21-day cycle.   LENalidomide (REVLIMID) 10 MG CAPS capsule just completed off week at Mon (12/12) will begin new cycle Daughter No Yes   Sig: Take 1 capsule (10 mg) by mouth daily for 14 days . Then take 7 days off. Take at the same time each day. Do not open, break or chew the capsule. Swallow whole, with water.   LORazepam (ATIVAN) 0.5 MG tablet PRN at PRN Daughter No Yes   Sig: Take 1 tablet (0.5 mg) by mouth every 6 hours as needed  for anxiety   Loperamide HCl (IMODIUM A-D PO) PRN at PRN Daughter Yes Yes   Sig: Take 1 tablet by mouth as needed    acyclovir (ZOVIRAX) 400 MG tablet 12/10/2022 at AM Daughter No Yes   Sig: Take 1 tablet (400 mg) by mouth 2 times daily Viral Prophylaxis.   aspirin 81 MG chewable tablet 12/10/2022 at AM Daughter Yes Yes   Sig: Take 81 mg by mouth daily    cyanocobalamin (VITAMIN B-12) 1000 MCG tablet Not Taking Daughter No No   Sig: Take 1 tablet (1,000 mcg) by mouth daily   Patient not taking: Reported on 12/10/2022   dexamethasone (DECADRON) 4 MG tablet 12/4/2022 Daughter No Yes   Sig: Take 1 tablet (4 mg) by mouth once a week   Patient taking differently: Take 4 mg by mouth once a week ON SUNDAYS   dexamethasone (DECADRON) 4 MG tablet  Daughter No No   Sig: Take 1 tablet (4 mg) by mouth once a week   famotidine (PEPCID) 40 MG tablet 12/10/2022 at AM Daughter No Yes   Sig: Take 1 tablet (40 mg) by mouth 2 times daily   furosemide (LASIX) 20 MG tablet PRN at PRN Daughter No Yes   Sig: Take 0.5 tablets (10 mg) by mouth daily as needed (For BP greater than 160/90)   lisinopril (ZESTRIL) 5 MG tablet 12/10/2022 at AM Daughter No Yes   Sig: Take 1 tablet (5 mg) by mouth daily   methimazole (TAPAZOLE) 5 MG tablet 12/10/2022 at AM Daughter No Yes   Sig: Take 0.5 tablets (2.5 mg) by mouth daily   mirabegron (MYRBETRIQ) 50 MG 24 hr tablet 12/10/2022 at AM Daughter No Yes   Sig: Take 1 tablet (50 mg) by mouth daily Last refill needs  To be seen   mirtazapine (REMERON) 7.5 MG tablet 12/9/2022 at HS Daughter Yes Yes   Sig: Take 2 tablets (15 mg) by mouth At Bedtime   nitroglycerin (NITROSTAT) 0.4 MG sublingual tablet PRN at PRN Daughter No Yes   Sig: For chest pain place 1 tablet under the tongue every 5 minutes for 3 doses. If symptoms persist 5 minutes after 1st dose call 911.   ondansetron (ZOFRAN) 8 MG tablet PRN at PRN Daughter No Yes   Sig: Take 1 tablet (8 mg) by mouth every 8 hours as needed for nausea   potassium  chloride ER (KLOR-CON M) 10 MEQ CR tablet 12/10/2022 Daughter No Yes   Sig: Take 1 tablet (10 mEq) by mouth daily   prochlorperazine (COMPAZINE) 10 MG tablet PRN at PRN Daughter No Yes   Sig: Take 1 tablet (10 mg) by mouth every 6 hours as needed (Nausea/Vomiting)   triamcinolone (KENALOG) 0.1 % paste Not Taking Daughter No No   Sig: Apply to tongue lesion twice daily for one week.   Patient not taking: Reported on 12/10/2022      Facility-Administered Medications: None          Allergy:     Allergies   Allergen Reactions     Levaquin [Levofloxacin] Other (See Comments)     Tingling in feet after 1 dose on 8/7/19            Inpatient Medications:   Scheduled Meds:    amoxicillin-clavulanate  1 tablet Oral Q12H [facility] (08/20)     aspirin  81 mg Oral Daily     dexamethasone  4 mg Oral Weekly     famotidine  20 mg Oral Daily     [START ON 12/12/2022] LENalidomide  10 mg Oral Daily     methimazole  2.5 mg Oral Daily     mirtazapine  15 mg Oral At Bedtime     PRN Meds: acetaminophen **OR** acetaminophen, bisacodyl, LORazepam, meclizine, melatonin, ondansetron **OR** ondansetron, prochlorperazine **OR** prochlorperazine **OR** prochlorperazine, senna-docusate **OR** senna-docusate        Review of Systems    Otherwise noncontributory  NEURO: see HPI       Physical Exam:   Physical Exam   Vitals:  Height:Data Unavailable  Weight:0 lbs 0 oz   Temp: 98.2  F (36.8  C) Temp src: Oral BP: (!) 143/73 Pulse: 69   Resp: 16 SpO2: 95 % O2 Device: None (Room air)    General Appearance:  No acute distress  Neuro:       Mental Status Exam:   Alert and oriented.  Language and speech intact-allowing for edentulous status.  She is closely monitoring the [place] game       Cranial Nerves: Vision/visual fields intact to finger counting.  Pupils are equal and reactive to light.  Extraocular movements intact.  No nystagmus.  Facial strength and sensation is normal.  No jaw or tongue deviation.  Palate and shoulder elevation symmetrical hearing  intact bilaterally.       Motor:   Tone bulk and strength intact x4.       Reflexes: Deep tendon reflexes reduced in the legs.  Plantar signs neutral bilaterally.  No clonus       Sensory: Vibration and cold sense intact x4.                 Coordination:   Intact x4 allowing for arthritic changes       Gait: Not tested due to unstable status  Neck: no nuchal rigidity,    Cardiovascular: Regular rate and rhythm, no m/r/g  Extremities: No clubbing, no cyanosis, no edema       Data:   ROUTINE IP LABS   CBC RESULTS:     Recent Labs   Lab 12/11/22  0719 12/10/22  0924   WBC 1.8* 1.5*   RBC 2.85* 3.07*   HGB 8.7* 9.4*   HCT 26.6* 28.8*   * 96*     Basic Metabolic Panel:   Recent Labs   Lab Test 12/11/22  0719 12/10/22  0924 12/02/22  1121    138 141   POTASSIUM 3.4 3.5 3.7   CHLORIDE 109 107 108   CO2 26 25 28   BUN 14 19 24   CR 0.66 0.69 0.67   GLC 87 110* 76   HUMBERTO 8.4* 8.3* 8.6     Liver panel:  Recent Labs   Lab Test 12/10/22  0924 12/02/22  1121 11/28/22  0915 11/21/22  0816 11/11/22  0840   PROTTOTAL 6.1* 6.2* 6.5* 6.5* 7.2   ALBUMIN 2.7* 3.0* 3.1* 2.8* 3.1*   BILITOTAL 0.6 0.3 0.4 0.2 0.3   ALKPHOS 83 70 67 83 78   AST 7 9 6 16 12   ALT 16 15 17 20 13     Lipid Profile:  Recent Labs   Lab Test 06/01/21  0744 02/09/15  0000   CHOL 152 176   HDL 77 82   LDL 58 75   TRIG 83 93     Thyroid Panel:  Recent Labs   Lab Test 12/10/22  0924 12/02/22  1121 08/17/22  1414 06/15/22  0928 04/07/22  1347 03/04/22  0918 02/01/22  1324 01/17/22  1412 12/15/21  1455 12/15/21  1455 06/01/21  0744   TSH 1.26 1.69 0.98 0.50 2.76 5.63* 4.25* 2.48   < > 0.03* 0.04*   T4  --   --   --   --   --  0.83 0.81 0.80  --  1.66* 1.14    < > = values in this interval not displayed.      Vitamin B12:   Recent Labs   Lab Test 11/29/21  1316 09/30/21  1310 05/01/17  1405   B12 1,435* 276 377       Latest Reference Range & Units 12/10/22 09:25   Resp Syncytial Virus Negative  Positive !   !: Data is abnormal       IMAGING:   CT HEAD W/O  CONTRAST, CTA HEAD NECK W CONTRAST  LOCATION: [facility]  DATE/TIME: 12/10/2022 11:06 AM     INDICATION: Vertigo.  COMPARISON: None.  CONTRAST: 75 mL Isovue 370  TECHNIQUE: Head and neck CT angiogram with IV contrast. Noncontrast head CT followed by axial helical CT images of the head and neck vessels obtained during the arterial phase of intravenous contrast administration. Axial 2D reconstructed images and   multiplanar 3D MIP reconstructed images of the head and neck vessels were performed by the technologist. Dose reduction techniques were used. All stenosis measurements made according to NASCET criteria unless otherwise specified.     FINDINGS:   NONCONTRAST HEAD CT:   INTRACRANIAL CONTENTS: No intracranial hemorrhage, extraaxial collection, or mass effect.  No CT evidence of acute infarct. Normal parenchymal attenuation. Normal ventricles and sulci.      VISUALIZED ORBITS/SINUSES/MASTOIDS: Prior bilateral cataract surgery. Visualized portions of the orbits are otherwise unremarkable. Circumferential mucosal thickening and air-fluid level in the left maxillary sinus. Complete opacification of the left   ethmoid and frontal sinuses. Mucosal thickening in the left sphenoid sinus locule. No middle ear or mastoid effusion.     BONES/SOFT TISSUES: No acute abnormality.     HEAD CTA:  ANTERIOR CIRCULATION: No stenosis/occlusion, aneurysm, or high flow vascular malformation. Standard [place] anatomy.     POSTERIOR CIRCULATION: No stenosis/occlusion, aneurysm, or high flow vascular malformation. Dominant left and smaller right vertebral artery contribute to a normal basilar artery.      DURAL VENOUS SINUSES: Expected enhancement of the major dural venous sinuses.     NECK CTA:  RIGHT CAROTID: No measurable stenosis or dissection.     LEFT CAROTID: No measurable stenosis or dissection.     VERTEBRAL ARTERIES: No focal stenosis or dissection. Dominant left and smaller right vertebral  arteries.     AORTIC ARCH: Classic aortic arch anatomy with no significant stenosis at the origin of the great vessels.     NONVASCULAR STRUCTURES: Heterogeneous nodule in the left thyroid gland measures 2.4 cm.                                                                      IMPRESSION:   HEAD CT:  1.  No acute intracranial process.  2.  Opacified left ethmoid and frontal sinuses and subtotal opacification of the left maxillary sinus with an air-fluid level.      HEAD CTA:   1.  No significant stenosis, aneurysm, or high flow vascular malformation identified.  2.  Variant [place] anatomy as above.     NECK CTA:  1.  No significant stenosis of the bilateral internal carotid arteries based on NASCET criteria      Time: 60minutes evaluation and management.     Maritza Nichols M.D.  [facility]  Office [phone]

## 2022-12-11 NOTE — UTILIZATION REVIEW
Admission Status; Secondary Review Determination    Under the authority of the Utilization Management Committee, the utilization review process indicated a secondary review on the above patient. The review outcome is based on review of the medical records, discussions with staff, and applying clinical experience noted on the date of the review.    (x) Inpatient Status Appropriate - This patient's medical care is consistent with medical management for inpatient care and reasonable inpatient medical practice.    RATIONALE FOR DETERMINATION:Complex 87-year-old female with ongoing multiple myeloma on weekly chemotherapy with associated marked neutropenia now presents to hospital with generalized weakness, episode of vomiting and dizziness with vertigo.  Patient unable to ambulate and has recurrent dizziness.  Comorbidities include hypertension, chronic diastolic heart failure, absolute neutrophil count of 300, positive RSV virus, opacified left ethmoid and frontal sinuses and subtotal opacification of the left maxillary sinus with an air-fluid level.  Latter requiring initial IV antibiotics and neutropenia patient concerning for associated vertigo.  Patient underwent a trial of canalith repositioning but on the afternoon of hospital day 2 continues to have ongoing dizziness, will need MRI of the brain but due to patient's pacemaker this is scheduled for hospital day 3.  Inpatient care appropriate for patient's ongoing dizziness recent vomiting, acute left frontal maxillary and ethmoid sinusitis with acute RSV infection and a markedly neutropenic patient.    At the time of admission with the information available to the attending physician more than 2 nights Hospital complex care was anticipated, based on patient risk of adverse outcome if treated as outpatient and complex care required. Inpatient admission is appropriate based on the Medicare guidelines.    This document was produced using voice recognition  software    The information on this document is developed by the utilization review team in order for the business office to ensure compliance. This only denotes the appropriateness of proper admission status and does not reflect the quality of care rendered.    The definitions of Inpatient Status and Observation Status used in making the determination above are those provided in the CMS Coverage Manual, Chapter 1 and Chapter 6, section 70.4.    Sincerely,    Virgil Bob MD  Utilization Review  Physician Advisor  [facility].

## 2022-12-11 NOTE — PROGRESS NOTES
12/11/22 1100   Appointment Info   Signing Clinician's Name / Credentials (PT) Brittany Dressler, DPT, NCS   Quick Adds   Quick Adds Vestibular Eval;Certification   Living Environment   People in Home alone   Current Living Arrangements house   Home Accessibility stairs to enter home   Number of Stairs, Main Entrance 4   Stair Railings, Main Entrance railings safe and in good condition   Living Environment Comments 1 level living, has walk-in shower but uses tub/shower with grab bars no seat. .   Self-Care   Usual Activity Tolerance moderate   Current Activity Tolerance poor   Equipment Currently Used at Home cane, quad;walker, rolling  (tripod cane)   Fall history within last six months yes   Number of times patient has fallen within last six months 1  (with this dizziness)   Activity/Exercise/Self-Care Comment Margi tripod cane.   General Information   Onset of Illness/Injury or Date of Surgery 12/10/22   Referring Physician Krupa Lewis MD   Patient/Family Therapy Goals Statement (PT) To get better to have more quality time with her great-grandchildren.   Pertinent History of Current Problem (include personal factors and/or comorbidities that impact the POC) Spinning dizziness with imbalance & fall. HH on chemo, HTN, SSS with pacer, throtoxicosis. CT and CTA negative, PT order for BPPV.   Existing Precautions/Restrictions fall   Cognition   Affect/Mental Status (Cognition) WFL   Follows Commands (Cognition) follows one-step commands   Pain Assessment   Patient Currently in Pain No   Posture    Posture Comments FHP, increased spinal flexion given chronic deconditiioning.   Range of Motion (ROM)   ROM Comment WFL   Strength (Manual Muscle Testing)   Strength Comments Mildly deconditioned but WFL. Symmetrical strength B.   Bed Mobility   Comment, (Bed Mobility) Bentley supine-sit   Transfers   Comment, (Transfers) Bentley sit-stand with hand placement cues, balance assist - dizzy but not spinning, pt sat back down.  "  Gait/Stairs (Locomotion)   Distance in Feet Pt declined given dizziness.   Balance   Balance Comments High falls risk given ongoing dizizness, Ax1 & RW indicated.   Sensory Examination   Sensory Perception Comments WFL   Coordination   Coordination Comments WFL   Muscle Tone   Muscle Tone Comments WFL   Cervicogenic Screen   Neck ROM WFL   Vertebral Artery Test Normal   Alar Ligament Test Normal   Transverse Ligament Test Normal   Oculomotor Exam   Smooth Pursuit Comment MIldly slowed asx, suspect pt's baseline, no overshooting   Saccades Comments MIldly weak asx, suspect pt's baseline,  no overshooting   VOR Comments Motion sensitivity, not spinning. Intensity 5/10.   Rapid Head Thrust Comments (+) B 1-2 beats.   Convergence Testing Normal   Convergence Testing Comments < 2\". Hi(+)N(-)Ts(-), A(1)B(0)C(0)D(1)D(0) 2 (low CVA risk) - all signs point to peripheral vestibular etiology.   Infrared Goggle Exam or Frenzel Lense Exam   Spontaneous Nystagmus Negative   Gaze Evoked Nystagmus Negative   Head Shake Horizontal Nystagmus Negative   Positional Testing Comments Edu on risks & benefits, pt agreeable to proceed. Pt denies hearing change, tinnitus, pressure influence.   Carlos-Hallpike (Left) Upbeating   [place]-Hallpike (Left) Comments STrong spinning sensation, transient, upbeats for < 10sec (not vertical nor direction-changing). Pt asking to stop but with edu agreeable to move right into rx.   Clinical Impression   Criteria for Skilled Therapeutic Intervention Yes, treatment indicated   PT Diagnosis (PT) Impaired mobility   Influenced by the following impairments BPPV, vestibulo-ocular hypofunction, impaired activity tolerance & self-efficacy.   Functional limitations due to impairments Impaired independent mobility & living   Clinical Presentation (PT Evaluation Complexity) Evolving/Changing   Clinical Decision Making (Complexity) low complexity   Planned Therapy Interventions (PT) balance training;bed mobility " training;gait training;home exercise program;neuromuscular re-education;patient/family education;postural re-education;stair training;strengthening;transfer training;progressive activity/exercise;risk factor education;home program guidelines;other (see comments)  (CRM)   Anticipated Equipment Needs at Discharge (PT) raised toilet seat;shower chair;walker, rolling;gait belt   Risk & Benefits of therapy have been explained evaluation/treatment results reviewed;care plan/treatment goals reviewed;risks/benefits reviewed;participants voiced agreement with care plan;current/potential barriers reviewed;participants included;patient   PT Total Evaluation Time   PT Eval, Low Complexity Minutes (89104) 30   Therapy Certification   Start of care date 12/10/22   Certification date from 12/10/22   Certification date to 12/16/22   Medical Diagnosis BPPV   Physical Therapy Goals   PT Frequency Daily   PT Predicted Duration/Target Date for Goal Attainment 12/16/22   PT Goals Bed Mobility;Transfers;Gait;Stairs;PT Goal 1   PT: Bed Mobility Modified independent;Supine to/from sit;Rolling   PT: Transfers Modified independent;Sit to/from stand;Bed to/from chair;Assistive device   PT: Gait Modified independent;150 feet;Assistive device   PT: Stairs 4 stairs;Minimal assist  (rail per home set-up)   PT: Goal 1 Pt will have resolved BPPV & motion senstivity, or have PT & safety Plans in place   Interventions   Interventions Quick Adds Canalith Repositioning   Canalith Repositioning   Canalith Repositioning Minutes (68765) 20   Symptoms Noted During/After Treatment dizziness;fatigue  (to tolerance)   Treatment Detail/Skilled Intervention PT edu pt and risks & benefits, pt agreeable to rx: 2 reps Epley's maneuver wtih spinning dizizness moving into both reps and rolling to face the ground both reps, post 1st rep pt with 5/10 motion sensitivity and post 2nd rep spins then no motion sensitivity. Finished in bed alarm armed. Skilled assist  through rx.   PT Discharge Planning   PT Plan PT: BPPV retesting/rx PRN, once cleared address hypofunciton and mobility.   PT Discharge Recommendation (DC Rec) Transitional Care Facility;home with assist;home with home care physical therapy   PT Rationale for DC Rec Unsafe to return to community until able to walk short distances, then could DC to adult children's if they're able to provide 24hr Bentley with RW use (unsafe to return home alone until she can walk independently with an AD and navigate stairs). Currently,rehab warranted first but pt is declining TCU so will see how she's doing tomorrow.   PT Brief overview of current status Bentley bed mob and stands, non-amb, BPPV + and pt with vestibular hypofunction with generalized motion sensitivity.   Total Session Time   Total Session Time (sum of timed and untimed services) 50

## 2022-12-11 NOTE — PLAN OF CARE
Goal Outcome Evaluation:  diagnostic tests and consults completed and resulted No  -vital signs normal or at patient baseline Yes  Nurse to notify provider when observation goals have been met and patient is ready for discharge      Orientation/Cognitive: A&OX4  Observation Goals (Met/ Not Met): Not met  Mobility Level/Assist Equipment: AX2 with GB and walker, gets very dizzy with any activity  Fall Risk (Y/N): yes  Behavior Concerns: None, pleasant  Pain Management: Tylenol for HA  Tele/VS/O2: VSS on RA, no tele  ABNL Lab/BG: Alb and protein low, Hgb of 9.4. PLT down at 96, WBC down at 1.5  Diet: Regular/ kosher  Bowel/Bladder: Continent, purewick for safety 2/2 dizziness and nausea with activity  Skin Concerns: Denies  Drains/Devices: IVF at 75mL/hr  Tests/Procedures for next shift: MRI pending, most likely on Monday. Vestibular PT pending  Anticipated DC date & active delays: Pending work up  Patient Stated Goal for Today: To rest

## 2022-12-11 NOTE — PROGRESS NOTES
[facility]    Hospitalist Progress Note    Date of Service (when I saw the patient): 12/11/2022    Assessment & Plan   Quiana Dunaway is a 87 year old female who was admitted on 12/10/2022.  Quiana Dunaway is a 87 year old female with history of multiple myeloma currently undergoing chemotherapy weekly on Mondays last chemo being last Monday today being Saturday, hypertension, history of sick sinus syndrome status post pacemaker placement, thyrotoxicosis presented to the emergency room with complaints of dizziness and vertigo.  She started having feelings of room spinning sensation since yesterday.  Yesterday she felt weak and fell on her bottom on the floor beside her bed.  And she was able to get up and getting to back into bed.  Today she was in her bed and suddenly sat up and moved her head and again became dizzy since then she has been intermittently dizzy and is having hard time walking.  She otherwise denies any headache.  She felt nauseous and vomited in the ambulance on the way to the emergency room.  No focal weakness no tingling or numbness no speech changes.  CT head and CTA head and neck were negative except acute sinusitis in the multiple sinuses left ethmoid frontal and a complete opacification of the left maxillary sinus with an air-fluid level     Acute vertigo with the dizziness could be BPPV versus acute stroke  Mechanical fall secondary to above;   admit her to the hospital under observation status  As needed meclizine to help with symptoms of vertigo and dizziness  MRI brain will be ordered once pacemaker is evaluated and compatible with MRI which will be done on Monday most likely  PT evaluation for possible BPPV for vestibular rehab therapy  Continue baby aspirin for now  General NEurology consult placed today      Acute left frontal maxillary and ethmoid sinusitis  Acute RSV infection;  Will treat her with IV unasyn  especially with her symptoms of dizziness.  Switched to oral augmentin today   With RSV she is mostly asymptomatic continue symptomatic treatment     History of multiple myeloma currently undergoing weekly chemotherapy on Mondays;  Pancytopenia most likely secondary to multiple myeloma on chemotherapy;  Continue her as needed antiemetics  And symptom treatment as needed     History of hypertension;  Hold lisinopril     History of depression;  Continue Remeron     DVT prophylaxis; ambulation     Diet; regular  CODE STATUS full code  Dispo: in the next 1-2days after improvement in vertigo and MRI brain        Krupa Lewis MD   Page [phone](7AM-6PM)        Interval History   Pt c/o vertigo and dizziness with sitting up In bed. Intermittent nausea. MRI to be done tomorrow     -Data reviewed today: I reviewed all new labs and imaging results over the last 24 hours. I personally reviewed no images or EKG's today.    Physical Exam   Temp: 98.2  F (36.8  C) Temp src: Oral BP: (!) 143/73 Pulse: 69   Resp: 16 SpO2: 95 % O2 Device: None (Room air)    There were no vitals filed for this visit.  Vital Signs with Ranges  Temp:  [97.8  F (36.6  C)-98.7  F (37.1  C)] 98.2  F (36.8  C)  Pulse:  [61-69] 69  Resp:  [16-26] 16  BP: (113-143)/(50-75) 143/73  SpO2:  [93 %-98 %] 95 %  I/O last 3 completed shifts:  In: 600 [I.V.:600]  Out: 950 [Urine:950]    Constitutional: Awake, alert, cooperative, no apparent distress  Respiratory: Clear to auscultation bilaterally, no crackles or wheezing  Cardiovascular: Regular rate and rhythm, normal S1 and S2, and no murmur noted  GI: Normal bowel sounds, soft, non-distended, non-tender  Skin/Integumen: No rashes, no cyanosis, no edema  Other:     Medications       amoxicillin-clavulanate  1 tablet Oral Q12H [facility] (08/20)     aspirin  81 mg Oral Daily     dexamethasone  4 mg Oral Weekly     famotidine  20 mg Oral Daily     [START ON 12/12/2022] LENalidomide  10 mg Oral Daily     methimazole  2.5 mg Oral Daily     mirtazapine  15 mg Oral  At Bedtime       Data   Recent Labs   Lab 12/11/22  0719 12/10/22  0924   WBC 1.8* 1.5*   HGB 8.7* 9.4*   MCV 93 94   * 96*    138   POTASSIUM 3.4 3.5   CHLORIDE 109 107   CO2 26 25   BUN 14 19   CR 0.66 0.69   ANIONGAP 6 6   HUMBERTO 8.4* 8.3*   GLC 87 110*   ALBUMIN  --  2.7*   PROTTOTAL  --  6.1*   BILITOTAL  --  0.6   ALKPHOS  --  83   ALT  --  16   AST  --  7   LIPASE  --  72*       No results found for this or any previous visit (from the past 24 hour(s)).

## 2022-12-12 ENCOUNTER — DOCUMENTATION ONLY (OUTPATIENT)
Dept: CARDIOLOGY | Facility: CLINIC | Age: 87
End: 2022-12-12

## 2022-12-12 ENCOUNTER — APPOINTMENT (OUTPATIENT)
Dept: PHYSICAL THERAPY | Facility: CLINIC | Age: 87
DRG: 149 | End: 2022-12-12
Payer: MEDICARE

## 2022-12-12 DIAGNOSIS — C90.00 MULTIPLE MYELOMA NOT HAVING ACHIEVED REMISSION (H): ICD-10-CM

## 2022-12-12 PROCEDURE — 250N000013 HC RX MED GY IP 250 OP 250 PS 637: Performed by: INTERNAL MEDICINE

## 2022-12-12 PROCEDURE — 99232 SBSQ HOSP IP/OBS MODERATE 35: CPT | Performed by: INTERNAL MEDICINE

## 2022-12-12 PROCEDURE — 97530 THERAPEUTIC ACTIVITIES: CPT | Mod: GP

## 2022-12-12 PROCEDURE — 250N000011 HC RX IP 250 OP 636: Performed by: INTERNAL MEDICINE

## 2022-12-12 PROCEDURE — 120N000001 HC R&B MED SURG/OB

## 2022-12-12 RX ADMIN — MIRTAZAPINE 15 MG: 15 TABLET, FILM COATED ORAL at 22:48

## 2022-12-12 RX ADMIN — AMOXICILLIN AND CLAVULANATE POTASSIUM 1 TABLET: 875; 125 TABLET, FILM COATED ORAL at 08:19

## 2022-12-12 RX ADMIN — ONDANSETRON 4 MG: 4 TABLET, ORALLY DISINTEGRATING ORAL at 12:01

## 2022-12-12 RX ADMIN — MECLIZINE HYDROCHLORIDE 25 MG: 25 TABLET ORAL at 12:01

## 2022-12-12 RX ADMIN — ASPIRIN 81 MG CHEWABLE TABLET 81 MG: 81 TABLET CHEWABLE at 08:19

## 2022-12-12 RX ADMIN — FAMOTIDINE 20 MG: 20 TABLET ORAL at 08:19

## 2022-12-12 RX ADMIN — AMOXICILLIN AND CLAVULANATE POTASSIUM 1 TABLET: 875; 125 TABLET, FILM COATED ORAL at 20:34

## 2022-12-12 RX ADMIN — METHIMAZOLE 2.5 MG: 5 TABLET ORAL at 08:19

## 2022-12-12 ASSESSMENT — ACTIVITIES OF DAILY LIVING (ADL)
ADLS_ACUITY_SCORE: 48
ADLS_ACUITY_SCORE: 44
ADLS_ACUITY_SCORE: 43
ADLS_ACUITY_SCORE: 44
ADLS_ACUITY_SCORE: 44
ADLS_ACUITY_SCORE: 43
ADLS_ACUITY_SCORE: 43
ADLS_ACUITY_SCORE: 48
ADLS_ACUITY_SCORE: 48
ADLS_ACUITY_SCORE: 44

## 2022-12-12 NOTE — TELEPHONE ENCOUNTER
At time of writing: patient was IP at [facility] 12-1-2022 RT Michael (R)

## 2022-12-12 NOTE — PLAN OF CARE
Goal Outcome Evaluation:      Plan of Care Reviewed With: patient, family    Orientation/Cognitive: A/o X 4  Observation Goals (Met/ Not Met): inpatient status   Mobility Level/Assist Equipment: up with 1 walker/GB  Fall Risk (Y/N): yes  Behavior Concerns: none  Pain Management: denies pain   Tele/VS/O2: VSS, RA. Orthostatics negative. C/o dizziness. Meclizine PRN.   ABNL Lab/BG: hgb down to 8.7, wbc 1.8  Diet: regular  Bowel/Bladder: continence   Skin Concerns: dry flaky skin   Drains/Devices: PIV  Tests/Procedures for next shift: awaiting MRI brain.   Anticipated DC date & active delays: 1-2 days pending MRI result. PT is recommending TCU. Pt wants to go home with home care.   Patient Stated Goal for Today: I want to go home

## 2022-12-12 NOTE — PLAN OF CARE
Goal Outcome Evaluation:         Orientation/Cognitive: WDL  Observation Goals (Met/ Not Met): N/A; pt is inpatient  Mobility Level/Assist Equipment: Assist of 1-2 with walker/gait belt secondary to dizziness  Fall Risk (Y/N): Yes  Behavior Concerns: None  Pain Management: Pt denied pain  Tele/VS/O2: AVSS; RA; no Tele  ABNL Lab/BG: WBC 1.8; plt 104; hgb 8.7  Diet: Regular diet  Bowel/Bladder: Voiding; no BM overnight  Skin Concerns: None  Drains/Devices: Purewick in overnight secondary to dizziness when getting up and pt stating she urinates frequently  Tests/Procedures for next shift: Neurology and PT consults; MRI still needed pending pacemaker clearance  Anticipated DC date & active delays: To be decided  Patient Stated Goal for Today: None given.    Pt stated she still gets dizzy with turning/repositioning.

## 2022-12-12 NOTE — PLAN OF CARE
Orientation/Cognitive: WDL  Observation Goals (Met/ Not Met): partially met  Mobility Level/Assist Equipment: Assist of 1-2 with gait belt/walker to bedside commode  Fall Risk (Y/N): Yes  Behavior Concerns: None  Pain Management: Pt denied pain  Tele/VS/O2: AVSS; RA; no Tele  ABNL Lab/BG: WBC 1.5; Hgb 8.7; plt 104;   Diet: Regular  Bowel/Bladder: Voiding; no bm today  Skin Concerns: None  Drains/Devices: Purewick left in today per pt request-(dizziness/nausea)  Tests/Procedures for next shift: MRI still due; will probably be done Monday secondary to pacemaker.  Anticipated DC date & active delays: PT recommending TCU  Patient Stated Goal for Today: to stop nausea and dizziness

## 2022-12-12 NOTE — PROGRESS NOTES
[facility]    Hospitalist Progress Note    Date of Service (when I saw the patient): 12/12/2022    Assessment & Plan   Quianaviri Dunaway is a 87 year old female with history of multiple myeloma currently undergoing chemotherapy weekly on Mondays last chemo being last Monday today being Saturday, hypertension, history of sick sinus syndrome status post pacemaker placement, thyrotoxicosis presented to the emergency room with complaints of dizziness and vertigo.  She started having feelings of room spinning sensation since yesterday.  Yesterday she felt weak and fell on her bottom on the floor beside her bed.  And she was able to get up and getting to back into bed.  Today she was in her bed and suddenly sat up and moved her head and again became dizzy since then she has been intermittently dizzy and is having hard time walking.  She otherwise denies any headache.  She felt nauseous and vomited in the ambulance on the way to the emergency room.  No focal weakness no tingling or numbness no speech changes.  CT head and CTA head and neck were negative except acute sinusitis in the multiple sinuses left ethmoid frontal and a complete opacification of the left maxillary sinus with an air-fluid level     Acute vertigo with the dizziness could be BPPV versus acute stroke  Mechanical fall secondary to above;     As needed meclizine to help with symptoms of vertigo and dizziness    MRI brain ordered    PT evaluation for possible BPPV for vestibular rehab therapy    Continue baby aspirin for now    General Neurology consult requested     Acute left frontal maxillary and ethmoid sinusitis  Acute RSV infection    treated with Unasyn, changed to oral augmentin 12/11     With RSV she is mostly asymptomatic continue symptomatic treatment     History of multiple myeloma currently undergoing weekly chemotherapy on Mondays;  Pancytopenia likely secondary to multiple myeloma on chemotherapy;    Continue her as  "needed antiemetics    Follow counts    Discussed with Dr. Parry, this weeks injection can be postponed until next week     History of hypertension;    Hold lisinopril    Follow blood pressure readings closely     History of depression;    Continue Remeron     DVT prophylaxis; ambulation     Diet; regular  CODE STATUS full code  Dispo: in the next 1-2days after improvement in vertigo and MRI brain      Stella Yu M.D.  Hospitalist  [facility]  Securely message with the Vocera Web Console (learn more here)  Text page via [employer] Paging/Directory       Interval History   \"I feel nauseated.\"  Patient says she \"had a good day yesterday,\" feels dizzy and nauseated today.  She says she has 3 sons and her son, Jones, plans to care for her after discharge, \"he says I am the boss.\"  She says she will go stay at his house and he will hire care for her.  She has no new respiratory or GI complaints.  She asks about her chemotherapy injection which is typically done at Dr. Parry's office on Mondays.    -Data reviewed today: I reviewed all new labs and imaging results over the last 24 hours. I personally reviewed no images or EKG's today.    Physical Exam   Temp: 97.9  F (36.6  C) Temp src: Oral BP: 115/63 Pulse: 63   Resp: 16 SpO2: 95 % O2 Device: None (Room air)    There were no vitals filed for this visit.  Vital Signs with Ranges  Temp:  [97.5  F (36.4  C)-98.6  F (37  C)] 97.9  F (36.6  C)  Pulse:  [63-79] 63  Resp:  [16] 16  BP: (115-155)/(63-78) 115/63  SpO2:  [95 %-97 %] 95 %  I/O last 3 completed shifts:  In: 375 [P.O.:375]  Out: 1100 [Urine:1100]    Constitutional: Awake, alert, cooperative, no apparent distress  Respiratory: Clear to auscultation bilaterally, no crackles or wheezing  Cardiovascular: Regular rate and rhythm, normal S1 and S2, and no murmur noted  GI: Normal bowel sounds, soft, non-distended, non-tender  Skin/Integumen: No rash on exposed skin  Other: Mood is pleasant    Medications "       amoxicillin-clavulanate  1 tablet Oral Q12H [facility] (08/20)     aspirin  81 mg Oral Daily     dexamethasone  4 mg Oral Weekly     famotidine  20 mg Oral Daily     LENalidomide  10 mg Oral Daily     methimazole  2.5 mg Oral Daily     mirtazapine  15 mg Oral At Bedtime       Data   Recent Labs   Lab 12/11/22  0719 12/10/22  0924   WBC 1.8* 1.5*   HGB 8.7* 9.4*   MCV 93 94   * 96*    138   POTASSIUM 3.4 3.5   CHLORIDE 109 107   CO2 26 25   BUN 14 19   CR 0.66 0.69   ANIONGAP 6 6   HUMBERTO 8.4* 8.3*   GLC 87 110*   ALBUMIN  --  2.7*   PROTTOTAL  --  6.1*   BILITOTAL  --  0.6   ALKPHOS  --  83   ALT  --  16   AST  --  7   LIPASE  --  72*       No results found for this or any previous visit (from the past 24 hour(s)).

## 2022-12-12 NOTE — PROGRESS NOTES
Received MRI Cardiology Clearance form from [facility] MRI department.  Form completed, signed by MD, and faxed back to MRI at [phone].      FAUSTINO Abel

## 2022-12-13 ENCOUNTER — TELEPHONE (OUTPATIENT)
Dept: FAMILY MEDICINE | Facility: CLINIC | Age: 87
End: 2022-12-13

## 2022-12-13 ENCOUNTER — APPOINTMENT (OUTPATIENT)
Dept: MRI IMAGING | Facility: CLINIC | Age: 87
DRG: 149 | End: 2022-12-13
Attending: INTERNAL MEDICINE
Payer: MEDICARE

## 2022-12-13 ENCOUNTER — APPOINTMENT (OUTPATIENT)
Dept: PHYSICAL THERAPY | Facility: CLINIC | Age: 87
DRG: 149 | End: 2022-12-13
Payer: MEDICARE

## 2022-12-13 PROCEDURE — 99232 SBSQ HOSP IP/OBS MODERATE 35: CPT | Performed by: INTERNAL MEDICINE

## 2022-12-13 PROCEDURE — 255N000002 HC RX 255 OP 636: Performed by: INTERNAL MEDICINE

## 2022-12-13 PROCEDURE — 250N000013 HC RX MED GY IP 250 OP 250 PS 637: Performed by: INTERNAL MEDICINE

## 2022-12-13 PROCEDURE — 120N000001 HC R&B MED SURG/OB

## 2022-12-13 PROCEDURE — 97110 THERAPEUTIC EXERCISES: CPT | Mod: GP

## 2022-12-13 PROCEDURE — 70553 MRI BRAIN STEM W/O & W/DYE: CPT | Mod: MA

## 2022-12-13 PROCEDURE — 97116 GAIT TRAINING THERAPY: CPT | Mod: GP

## 2022-12-13 PROCEDURE — A9585 GADOBUTROL INJECTION: HCPCS | Performed by: INTERNAL MEDICINE

## 2022-12-13 PROCEDURE — 97112 NEUROMUSCULAR REEDUCATION: CPT | Mod: GP

## 2022-12-13 PROCEDURE — 250N000011 HC RX IP 250 OP 636: Performed by: INTERNAL MEDICINE

## 2022-12-13 RX ORDER — MECLIZINE HYDROCHLORIDE 25 MG/1
25 TABLET ORAL EVERY 6 HOURS PRN
Qty: 10 TABLET | Refills: 0 | Status: SHIPPED | OUTPATIENT
Start: 2022-12-13 | End: 2023-04-03

## 2022-12-13 RX ORDER — ACETAMINOPHEN 500 MG
1000 TABLET ORAL EVERY 6 HOURS PRN
Status: DISCONTINUED | OUTPATIENT
Start: 2022-12-13 | End: 2022-12-14 | Stop reason: HOSPADM

## 2022-12-13 RX ORDER — DIAZEPAM 5 MG
5 TABLET ORAL
Status: COMPLETED | OUTPATIENT
Start: 2022-12-13 | End: 2022-12-13

## 2022-12-13 RX ORDER — PROCHLORPERAZINE MALEATE 10 MG
10 TABLET ORAL EVERY 6 HOURS PRN
Qty: 30 TABLET | Refills: 6 | Status: SHIPPED | OUTPATIENT
Start: 2022-12-13 | End: 2023-04-03

## 2022-12-13 RX ORDER — LOPERAMIDE HCL 2 MG
2 CAPSULE ORAL 4 TIMES DAILY PRN
Status: DISCONTINUED | OUTPATIENT
Start: 2022-12-13 | End: 2022-12-14 | Stop reason: HOSPADM

## 2022-12-13 RX ORDER — GADOBUTROL 604.72 MG/ML
6 INJECTION INTRAVENOUS ONCE
Status: COMPLETED | OUTPATIENT
Start: 2022-12-13 | End: 2022-12-13

## 2022-12-13 RX ORDER — DEXAMETHASONE 4 MG/1
4 TABLET ORAL WEEKLY
Start: 2022-12-13 | End: 2024-02-20

## 2022-12-13 RX ORDER — ACYCLOVIR 400 MG/1
400 TABLET ORAL 2 TIMES DAILY
Status: DISCONTINUED | OUTPATIENT
Start: 2022-12-13 | End: 2022-12-14 | Stop reason: HOSPADM

## 2022-12-13 RX ADMIN — GADOBUTROL 6 ML: 604.72 INJECTION INTRAVENOUS at 17:16

## 2022-12-13 RX ADMIN — AMOXICILLIN AND CLAVULANATE POTASSIUM 1 TABLET: 875; 125 TABLET, FILM COATED ORAL at 21:18

## 2022-12-13 RX ADMIN — ASPIRIN 81 MG CHEWABLE TABLET 81 MG: 81 TABLET CHEWABLE at 09:20

## 2022-12-13 RX ADMIN — LOPERAMIDE HYDROCHLORIDE 2 MG: 2 CAPSULE ORAL at 14:45

## 2022-12-13 RX ADMIN — DIAZEPAM 5 MG: 5 TABLET ORAL at 14:45

## 2022-12-13 RX ADMIN — MIRTAZAPINE 15 MG: 15 TABLET, FILM COATED ORAL at 21:18

## 2022-12-13 RX ADMIN — ACETAMINOPHEN 650 MG: 325 TABLET, FILM COATED ORAL at 04:58

## 2022-12-13 RX ADMIN — ONDANSETRON 4 MG: 4 TABLET, ORALLY DISINTEGRATING ORAL at 09:56

## 2022-12-13 RX ADMIN — AMOXICILLIN AND CLAVULANATE POTASSIUM 1 TABLET: 875; 125 TABLET, FILM COATED ORAL at 09:20

## 2022-12-13 RX ADMIN — LORAZEPAM 0.5 MG: 0.5 TABLET ORAL at 16:05

## 2022-12-13 RX ADMIN — ACYCLOVIR 400 MG: 400 TABLET ORAL at 22:19

## 2022-12-13 RX ADMIN — LOPERAMIDE HYDROCHLORIDE 2 MG: 2 CAPSULE ORAL at 09:20

## 2022-12-13 RX ADMIN — METHIMAZOLE 2.5 MG: 5 TABLET ORAL at 09:20

## 2022-12-13 RX ADMIN — FAMOTIDINE 20 MG: 20 TABLET ORAL at 09:20

## 2022-12-13 ASSESSMENT — ACTIVITIES OF DAILY LIVING (ADL)
ADLS_ACUITY_SCORE: 43
ADLS_ACUITY_SCORE: 47
ADLS_ACUITY_SCORE: 43
ADLS_ACUITY_SCORE: 43
DEPENDENT_IADLS:: CLEANING;MEDICATION MANAGEMENT;TRANSPORTATION
ADLS_ACUITY_SCORE: 43
TOILETING: 0-->INDEPENDENT
ADLS_ACUITY_SCORE: 43
ADLS_ACUITY_SCORE: 43
ADLS_ACUITY_SCORE: 47
ADLS_ACUITY_SCORE: 43
ADLS_ACUITY_SCORE: 27
TOILETING: 0-->INDEPENDENT
ADLS_ACUITY_SCORE: 26
ADLS_ACUITY_SCORE: 43

## 2022-12-13 NOTE — PLAN OF CARE
Goal Outcome Evaluation:      Plan of Care Reviewed With: patient    Orientation/Cognit AOx4  Observation Goals (Met/ Not Met): Inpatient  Mobility Level/Assist Equipment: SBA/W  Fall Risk (Y/N): Y  Behavior Concerns: None  Tele/VS/O2: VSS on RA  ABNL Lab/BG: WBC 1.8, Hgb 8.7  Diet: Regular  Bowel/Bladder: Continent   Skin Concerns: None  Drains/Devices: PIV SL  Tests/Procedures for next shift: None  Anticipated DC date & active delays: 12/14  Patient Stated Goal for Today: Get MRI done

## 2022-12-13 NOTE — PROGRESS NOTES
[facility]  Neuroscience [facility] [place]  Neurology Daily Note                Interval History:   Ms. Gandhi reported no other acute symptoms, she continues to report having vertiginous symptoms with head movement but denied tinnitus, hearing impairment, visual impairment or paresthesia of the extremities.  She is a an 87-year-old patient was seen in consultation by Dr. Johansen in the, on 12/11/2022 with concerns regarding acute vertiginous symptoms, with abrupt onset on the day of her admission 12/10/2022.  Patient reported no other acute symptoms and has no past history for recurring vertiginous spells.  No recent head or neck trauma reported.  She has a history for multiple melanoma and has been undergoing chemotherapy, she also has a history for sick sinus syndrome, post pacemaker placement and came for urgent assessment of her acute symptoms on 12/10/2022 when she had a CT of the head and CTA of the head and neck which were negative for acute changes, no evidence of dissection or vertebrobasilar stenosis.  A brain MRI was recommended but is still pending as the patient will need to be monitored, she has a pacemaker in situ.       Review of Systems:   The 10 point Review of Systems is negative other than noted in the HPI       Medications:   Scheduled Meds:    amoxicillin-clavulanate  1 tablet Oral Q12H [facility] (08/20)     aspirin  81 mg Oral Daily     dexamethasone  4 mg Oral Weekly     diazepam  5 mg Oral On Call to OR     famotidine  20 mg Oral Daily     LENalidomide  10 mg Oral Daily     methimazole  2.5 mg Oral Daily     mirtazapine  15 mg Oral At Bedtime     PRN Meds: acetaminophen, bisacodyl, loperamide, LORazepam, meclizine, melatonin, ondansetron **OR** ondansetron, prochlorperazine **OR** prochlorperazine **OR** prochlorperazine, senna-docusate **OR** senna-docusate        Physical Exam:   Vitals: Blood pressure 138/69, pulse 74, temperature 98.4  F (36.9  C), temperature source Oral,  resp. rate 16, SpO2 95 %, not currently breastfeeding.  General Appearance: She was in no apparent distress  Neuro:       Mental Status Exam: She was very alert with fluent speech and oriented to time place and person follows simple and complex commands quite well.       Cranial Nerves:   There was no ptosis and visual fields were intact to confrontation, extraocular movements were full without nystagmoid movements or disconjugate gaze, there was no facial asymmetry and rest of the cranial nerve examination, 2-12 was intact.       Motor: She had normal muscle bulk and tone in all 4 extremities and there were no dystonic movements or tremors.  She had excellent strength, 4+/5, proximally and distally in both upper and lower extremities.                         Coordination: There was no finger-to-nose OR heel-to-shin dysmetria.       Gait: Not tested due to dizziness  Cardiovascular: Regular rate and rhythm, no m/r/g  Lungs: Clear to auscultation  Abdomen: Soft, not tender, not destended  Extremities: No clubbing, no cyanosis, no edema       Data:   ROUTINE IP LABS (Last 3results)  CBC RESULTS:     Recent Labs   Lab 12/11/22  0719 12/10/22  0924   WBC 1.8* 1.5*   RBC 2.85* 3.07*   HGB 8.7* 9.4*   HCT 26.6* 28.8*   * 96*     Basic Metabolic Panel:  Recent Labs   Lab 12/11/22  0719 12/10/22  0924    138   POTASSIUM 3.4 3.5   CHLORIDE 109 107   CO2 26 25   BUN 14 19   CR 0.66 0.69   GLC 87 110*   HUMBERTO 8.4* 8.3*     INR:No lab results found in last 7 days.   Lipid Profile:  Recent Labs   Lab Test 06/01/21  0744 02/09/15  0000   CHOL 152 176   HDL 77 82   LDL 58 75   TRIG 83 93     TSH:  TSH   Date Value Ref Range Status   12/10/2022 1.26 0.40 - 4.00 mU/L Final   06/01/2021 0.04 (L) 0.40 - 4.00 mU/L Final   ,   Vitamin B12:   Lab Results   Component Value Date    B12 1,435 11/29/2021    B12 377 05/01/2017     -12/10/2022   CT head  -no intracranial hemorrhage, extraaxial collection, or mass effect.   - No CT  evidence of acute infarct. Normal parenchymal attenuation.   -Normal ventricles and sulci.d      Assessment and Plan:   1. Abrupt onset of vertiginous symptoms not associated with tinnitus, hearing loss, diplopia or paresthesia of the extremities.  -- Her symptoms appear to be positional and etiological considerations would include    1.  Peripheral vestibulopathy, viral, versus benign positional vertigo.  2.  The abrupt onset of her symptoms are somewhat unusual and agree with proceeding with MRI of the brain to rule out an acute ischemic stroke, or CP angle lesion, the latter is less likely.  3.  Although she has no other associated symptoms did not indicate brainstem ischemia,, CTA of the head and neck was was done on admission and revealed no evidence of occlusion or dissections.  Recommendations    1.  MRI of the brain  2.  Patient had a CTA of the head and neck which did not reveal any hemodynamically significant stenosis or dissection.    Further recommendations made after above, if indicated, if the studies are negative or normal, would recommend could need continued assessment by physical therapy for vestibular treatment    Irma Chapman MD  [facility] Neurology, [facility]  Telephone number [phone]

## 2022-12-13 NOTE — PLAN OF CARE
Goal Outcome Evaluation:         Plan of Care Reviewed With: patient, family     Orientation/Cognitive: A/o X 4  Observation Goals (Met/ Not Met): inpatient status   Mobility Level/Assist Equipment: up with 1 walker/GB  Fall Risk (Y/N): yes  Behavior Concerns: none  Pain Management:tylenol for right shoulder pain  Tele/VS/O2: VSS, RA. Orthostatics negative. C/o dizziness. Meclizine PRN.   ABNL Lab/BG: hgb down to 8.7, wbc 1.8  Diet: regular  Bowel/Bladder: continence   Skin Concerns: dry flaky skin   Drains/Devices: PIV  Tests/Procedures for next shift: awaiting MRI brain, has a pacemeaker and needs a representative present  Anticipated DC date & active delays:  PT is recommending TCU. Pt wants to go home with home care.   Patient Stated Goal for Today: I want to go home

## 2022-12-13 NOTE — PROGRESS NOTES
Care Management Follow Up    Length of Stay (days): 2    Expected Discharge Date: 12/13/2022     Concerns to be Addressed:       Patient plan of care discussed at interdisciplinary rounds: Yes    Anticipated Discharge Disposition:       Anticipated Discharge Services:    Anticipated Discharge DME:      Patient/family educated on Medicare website which has current facility and service quality ratings:    Education Provided on the Discharge Plan:    Patient/Family in Agreement with the Plan:      Referrals Placed by CM/SW:    Private pay costs discussed: Not applicable    Additional Information:  Patient was accepted by [facility] for home care.      Ro Chu RN

## 2022-12-13 NOTE — PROGRESS NOTES
[facility]    Hospitalist Progress Note    Date of Service (when I saw the patient): 12/13/2022    Assessment & Plan   Quianaviri Dunaway is a 87 year old female with history of multiple myeloma currently undergoing chemotherapy weekly on Mondays last chemo being last Monday today being Saturday, hypertension, history of sick sinus syndrome status post pacemaker placement, thyrotoxicosis presented to the emergency room with complaints of dizziness and vertigo.  She started having feelings of room spinning sensation since yesterday.  Yesterday she felt weak and fell on her bottom on the floor beside her bed.  And she was able to get up and getting to back into bed.  Today she was in her bed and suddenly sat up and moved her head and again became dizzy since then she has been intermittently dizzy and is having hard time walking.  She otherwise denies any headache.  She felt nauseous and vomited in the ambulance on the way to the emergency room.  No focal weakness no tingling or numbness no speech changes.  CT head and CTA head and neck were negative except acute sinusitis in the multiple sinuses left ethmoid frontal and a complete opacification of the left maxillary sinus with an air-fluid level     Acute vertigo with the dizziness could be BPPV versus acute stroke  Mechanical fall secondary to above;     As needed meclizine to help with symptoms of vertigo and dizziness    MRI brain ordered, see scheduling difficulties outlined in nursing notes    PT evaluation for possible BPPV for vestibular rehab therapy    Continue baby aspirin for now    General Neurology consult requested     Acute left frontal maxillary and ethmoid sinusitis  Acute RSV infection    treated with Unasyn, changed to oral augmentin 12/11     With RSV she is mostly asymptomatic continue symptomatic treatment     History of multiple myeloma currently undergoing weekly chemotherapy on Mondays;  Pancytopenia likely secondary to  "multiple myeloma on chemotherapy;    Continue her as needed antiemetics    Follow counts    Discussed with Dr. Parry, this weeks injection can be postponed until next week     History of hypertension;    Hold lisinopril    Follow blood pressure readings closely     History of depression;    Continue Remeron     DVT prophylaxis; ambulation     Diet; regular  CODE STATUS full code  Dispo: depends on timing for MRI and MRI result.  Anticipate discharge home with home health care     Stella Yu M.D.  Hospitalist  [facility]  Securely message with the Vocera Web Console (learn more here)  Text page via [employer] Paging/Directory       Interval History   \"I am nervous about that MRI.  I have never had 1.\"  Patient says she is \"an anxious person\" and that she has claustrophobia.  She is worried about her ability to complete the MRI exam, asks for premedication.  She has no new respiratory or GI complaints.    -Data reviewed today: I reviewed all new labs and imaging results over the last 24 hours. I personally reviewed no images or EKG's today.    Physical Exam   Temp: 97.6  F (36.4  C) Temp src: Oral BP: 116/70 Pulse: 64   Resp: 16 SpO2: 94 % O2 Device: None (Room air)    There were no vitals filed for this visit.  Vital Signs with Ranges  Temp:  [97.6  F (36.4  C)-98.2  F (36.8  C)] 97.6  F (36.4  C)  Pulse:  [63-94] 64  Resp:  [16-18] 16  BP: (116-135)/(59-70) 116/70  SpO2:  [94 %-97 %] 94 %  I/O last 3 completed shifts:  In: 240 [P.O.:240]  Out: 1040 [Urine:1040]    Constitutional: Awake, alert, cooperative, no apparent distress  Respiratory: Clear to auscultation bilaterally, no crackles or wheezing  Cardiovascular: Regular rate and rhythm, normal S1 and S2, and no murmur noted  GI: Normal bowel sounds, soft, non-distended, non-tender  Skin/Integumen: No rash on exposed skin  Other: Mood is pleasant    Medications       amoxicillin-clavulanate  1 tablet Oral Q12H [facility] (08/20)     aspirin  81 mg Oral " Daily     dexamethasone  4 mg Oral Weekly     famotidine  20 mg Oral Daily     LENalidomide  10 mg Oral Daily     methimazole  2.5 mg Oral Daily     mirtazapine  15 mg Oral At Bedtime       Data   Recent Labs   Lab 12/11/22  0719 12/10/22  0924   WBC 1.8* 1.5*   HGB 8.7* 9.4*   MCV 93 94   * 96*    138   POTASSIUM 3.4 3.5   CHLORIDE 109 107   CO2 26 25   BUN 14 19   CR 0.66 0.69   ANIONGAP 6 6   HUMBERTO 8.4* 8.3*   GLC 87 110*   ALBUMIN  --  2.7*   PROTTOTAL  --  6.1*   BILITOTAL  --  0.6   ALKPHOS  --  83   ALT  --  16   AST  --  7   LIPASE  --  72*       No results found for this or any previous visit (from the past 24 hour(s)).

## 2022-12-13 NOTE — PROGRESS NOTES
MD Notification    Notified Person: MD    Notified Person Name: Aimee     Notification Date/Time: 12/13/22 at 3:55 pm    Notification Interaction: Vocera text    Purpose of Notification: Recieved 5 mg Valium at 2:45 pm, still feeling anxious about MRI, to be done at 4:00 pm now. Ok to give 0.5 mg Ativan?    Orders Received: Yes, OK to give before MRI    Comments:

## 2022-12-13 NOTE — CONSULTS
Care Management Initial Consult    General Information  Assessment completed with: Patient,    Type of CM/SW Visit: Initial Assessment    Primary Care Provider verified and updated as needed: Yes, Dr. Chung   Readmission within the last 30 days: no previous admission in last 30 days      Reason for Consult: discharge planning  Advance Care Planning:   None on file        Communication Assessment  Patient's communication style: spoken language (English or Bilingual)         Cognitive  Cognitive/Neuro/Behavioral: WDL                      Living Environment:   People in home: alone     Current living Arrangements: house      Able to return to prior arrangements: other (see comments) will be with family at son's house       Family/Social Support:  Care provided by: self, child(jocelyn), other (see comments)  Provides care for:    Marital Status:              Description of Support System: very supportive          Current Resources:   Patient receiving home care services: No (private duty caregiviers arranged by family, Granddaughter, DIL help     Community Resources: None  Equipment currently used at home: cane, quad, walker, rolling, walker, standard, wheelchair, manual  Supplies currently used at home:      Employment/Financial:  Employment Status: employed part-time        Financial Concerns: No concerns identified           Lifestyle & Psychosocial Needs:  Social Determinants of Health     Tobacco Use: Low Risk      Smoking Tobacco Use: Never     Smokeless Tobacco Use: Never     Passive Exposure: Not on file   Alcohol Use: Not on file   Financial Resource Strain: Not on file   Food Insecurity: Not on file   Transportation Needs: Not on file   Physical Activity: Not on file   Stress: Not on file   Social Connections: Not on file   Intimate Partner Violence: Not on file   Depression: Not at risk     PHQ-2 Score: 0   Housing Stability: Not on file       Functional Status:  Prior to admission patient needed  "assistance:   Dependent ADLs:: Ambulation-cane  Dependent IADLs:: Cleaning, Medication, DIL sets up and patient takes Management, Transportation, has not driven for 1 year, family provides transportation.       Mental Health Status:  Mental Health Status: No Current Concerns       Chemical Dependency Status:      None noted          Values/Beliefs:  Spiritual, Cultural Beliefs, Buddhist Practices, Values that affect care: no               Additional Information:  Writer placed call to Primitivo wick at [phone]. He told writer that patient was previously living in her own home alone. Caregivers(family and friends they know) would be paid privately to help with daily activities and cleaning, showering etc. Primitivo told writer that the families plan is to have her go to his house and the caregivers, patient's granddaughter, her Daughter in law, his brother and his sister are all going to help take care of her. Writer explained that home care is recommended with Skilled RN/PT/OT coming to her house or his. He told writer that he lives at :  [address] [place] [address] and \"That would be wonderful\".   Referral sent to [facility] for home care.  Ro Chu RN        "

## 2022-12-13 NOTE — TELEPHONE ENCOUNTER
I did some digging  It looks like she has RSV complicated by sinusitis   She had vertigo, and the infection is the most likely cause for the vertigo, but the inpatient team is worried about the very remote possibility of a stroke and they are awaiting an MRI to rule that out

## 2022-12-13 NOTE — TELEPHONE ENCOUNTER
"Pts daughter in law Jackie (c2c on file) called the clinic stating the pt is in the hospital and they are not getting any answers as to why the hospital is keeping the pt and wanted to know if Dr. Chung could do some \"digging\" and find out why. A Grata message was also sent on 12/12 about this.       Routing to PCP to review and advise.     Please call Jackie back.   [phone] (can leave a detailed message)      "

## 2022-12-13 NOTE — CARE PLAN
Orientation/Cognit A&Ox4  Observation Goals (Met/ Not Met): inpatient  Mobility Level/Assist Equipment: SBA + walker  Fall Risk (Y/N): yes  Behavior Concerns: none  Tele/VS/O2: WNL  ABNL Lab/BG: WBC- 1.8, Hgb- 8.7  Diet: regular  Bowel/Bladder: continent. Reports 2 loose stool this shift  Skin Concerns: none  Drains/Devices: N/A  Tests/Procedures for next shift: MRI  Anticipated DC date & active delays: 12/14  Patient Stated Goal for Today: rest

## 2022-12-13 NOTE — TELEPHONE ENCOUNTER
Patient Contact    Attempt # 1    Was call answered?  Yes. Spoke to daughter-in-law Jackie (C2C) and relayed Dr. Chung's message. Jackie expressed verbal understanding and asked about next steps. Writer advised family to speak with inpatient team about next steps, as the inpatient team is currently managing the patient's care. Jackie expressed verbal understanding.    Nikole Cardenas RN  -[facility]

## 2022-12-13 NOTE — PROGRESS NOTES
Pt has been waiting for MRI. Multiple family members checking in to understand why MRI is delayed. I spoke with MRI tech this morning at 1107 am and I was told they were waiting for MRI clearance from cardiology clinic. I called MRI again at 1828 and was notified they have the paper work but pacemaker rep is gone for the day. Tech was unsure of when they can accommodate for MRI tomorrow as one of their scanners are currently down.  I reiterated to the MRI tech that pt has been waiting for her MRI for the last two days. Discharge is pending MRI .

## 2022-12-14 ENCOUNTER — APPOINTMENT (OUTPATIENT)
Dept: PHYSICAL THERAPY | Facility: CLINIC | Age: 87
DRG: 149 | End: 2022-12-14
Payer: MEDICARE

## 2022-12-14 VITALS
OXYGEN SATURATION: 97 % | RESPIRATION RATE: 16 BRPM | HEART RATE: 62 BPM | DIASTOLIC BLOOD PRESSURE: 62 MMHG | SYSTOLIC BLOOD PRESSURE: 132 MMHG | TEMPERATURE: 98.3 F

## 2022-12-14 LAB
BASOPHILS # BLD AUTO: 0 10E3/UL (ref 0–0.2)
BASOPHILS NFR BLD AUTO: 1 %
EOSINOPHIL # BLD AUTO: 0 10E3/UL (ref 0–0.7)
EOSINOPHIL NFR BLD AUTO: 1 %
ERYTHROCYTE [DISTWIDTH] IN BLOOD BY AUTOMATED COUNT: 15.3 % (ref 10–15)
HCT VFR BLD AUTO: 28.8 % (ref 35–47)
HGB BLD-MCNC: 9.4 G/DL (ref 11.7–15.7)
IMM GRANULOCYTES # BLD: 0 10E3/UL
IMM GRANULOCYTES NFR BLD: 1 %
LYMPHOCYTES # BLD AUTO: 1 10E3/UL (ref 0.8–5.3)
LYMPHOCYTES NFR BLD AUTO: 47 %
MCH RBC QN AUTO: 30.4 PG (ref 26.5–33)
MCHC RBC AUTO-ENTMCNC: 32.6 G/DL (ref 31.5–36.5)
MCV RBC AUTO: 93 FL (ref 78–100)
MONOCYTES # BLD AUTO: 0.4 10E3/UL (ref 0–1.3)
MONOCYTES NFR BLD AUTO: 20 %
NEUTROPHILS # BLD AUTO: 0.6 10E3/UL (ref 1.6–8.3)
NEUTROPHILS NFR BLD AUTO: 30 %
NRBC # BLD AUTO: 0 10E3/UL
NRBC BLD AUTO-RTO: 0 /100
PLATELET # BLD AUTO: 160 10E3/UL (ref 150–450)
RBC # BLD AUTO: 3.09 10E6/UL (ref 3.8–5.2)
WBC # BLD AUTO: 2.2 10E3/UL (ref 4–11)

## 2022-12-14 PROCEDURE — 97112 NEUROMUSCULAR REEDUCATION: CPT | Mod: GP

## 2022-12-14 PROCEDURE — 85025 COMPLETE CBC W/AUTO DIFF WBC: CPT | Performed by: INTERNAL MEDICINE

## 2022-12-14 PROCEDURE — 250N000013 HC RX MED GY IP 250 OP 250 PS 637: Performed by: INTERNAL MEDICINE

## 2022-12-14 PROCEDURE — 36415 COLL VENOUS BLD VENIPUNCTURE: CPT | Performed by: INTERNAL MEDICINE

## 2022-12-14 PROCEDURE — 97530 THERAPEUTIC ACTIVITIES: CPT | Mod: GP

## 2022-12-14 PROCEDURE — 99239 HOSP IP/OBS DSCHRG MGMT >30: CPT | Performed by: INTERNAL MEDICINE

## 2022-12-14 RX ADMIN — LOPERAMIDE HYDROCHLORIDE 2 MG: 2 CAPSULE ORAL at 12:28

## 2022-12-14 RX ADMIN — FAMOTIDINE 20 MG: 20 TABLET ORAL at 08:55

## 2022-12-14 RX ADMIN — METHIMAZOLE 2.5 MG: 5 TABLET ORAL at 08:55

## 2022-12-14 RX ADMIN — ACYCLOVIR 400 MG: 400 TABLET ORAL at 08:55

## 2022-12-14 RX ADMIN — ASPIRIN 81 MG CHEWABLE TABLET 81 MG: 81 TABLET CHEWABLE at 08:55

## 2022-12-14 RX ADMIN — AMOXICILLIN AND CLAVULANATE POTASSIUM 1 TABLET: 875; 125 TABLET, FILM COATED ORAL at 08:55

## 2022-12-14 RX ADMIN — LENALIDOMIDE 10 MG: 10 CAPSULE ORAL at 09:02

## 2022-12-14 ASSESSMENT — ACTIVITIES OF DAILY LIVING (ADL)
ADLS_ACUITY_SCORE: 39
ADLS_ACUITY_SCORE: 42
ADLS_ACUITY_SCORE: 39
ADLS_ACUITY_SCORE: 42
ADLS_ACUITY_SCORE: 42
ADLS_ACUITY_SCORE: 27
ADLS_ACUITY_SCORE: 39

## 2022-12-14 NOTE — PROGRESS NOTES
"Physical Therapy Discharge Summary    Reason for therapy discharge:    Discharged to home with home therapy.    Progress towards therapy goal(s). See goals on Care Plan in Psychiatric electronic health record for goal details.  Goals partially met.  Barriers to achieving goals:   discharge from facility.    Therapy recommendation(s):    Continued therapy is recommended.  Rationale/Recommendations:  per below.       12/14/22 8246   Appointment Info   Signing Clinician's Name / Credentials (PT) Brittany Dressler, DPT, NCS   Oculomotor Exam   VOR Comments Still with mild motion sensitivity with quicker transitions, describes it as spins, no nystagmus during PT today.   Infrared Goggle Exam or Frenzel Lense Exam   Spontaneous Nystagmus Negative   Gaze Evoked Nystagmus Negative   Head Shake Horizontal Nystagmus Negative   Vickery-Hallpike (Right) Negative   Vickery-Hallpike (Right) Comments Asx   Vickery-Hallpike (Left) Negative   Vickery-Hallpike (Left) Comments Asx   [facility] Supine Roll Test (Right) Negative   [facility] Supine Roll Test (Right) Comments Asx   [facility] Supine Roll Test (Left) Negative   [facility] Supine Roll Test (Left) Comments  Asx   Therapeutic Activity   Therapeutic Activities: dynamic activities to improve functional performance Minutes (60953) 8   Symptoms Noted During/After Treatment Dizziness   Treatment Detail/Skilled Intervention Pt agreeable to PT: Margi supine<>sit mild dizizness, EOB sit balance Margi, sit-stands Margi with safe technique, 30' RW and pivots distant supervision-Margi. Finished in bed alarm armed. Pt reports she's going home with family, 24hr support, \"lots of help.\"   Neuromuscular Re-education   Neuromuscular Re-Education Minutes (23994) 20   Symptoms Noted During/After Treatment dizziness   Treatment Detail/Skilled Intervention PT vestib re-testing per above with resolved BPPV, mild vestibular hypofunction with motion sensitivity yet. Reviewed VORx1 with pt's HEP, pt needing Ax1 for technique cues - tolerates well. "   PT Discharge Planning   PT Plan Acute PT needs.   PT Discharge Recommendation (DC Rec) home with assist;home with home care physical therapy   PT Rationale for DC Rec Pt plans to stay with her son who is also hiring private pay care for the pt and will have home PT. Agree with this plan as it is safe and pt has needed support. Rec home PT and OT at discharge.   PT Brief overview of current status Supervision-Margi RW, BPPV resolved, mild vestibular hypofunction.   Total Session Time   Timed Code Treatment Minutes 28   Total Session Time (sum of timed and untimed services) 28

## 2022-12-14 NOTE — PROVIDER NOTIFICATION
While on cross cover this evening I was paged by RN regarding patient's desire to re-start acyclovir. Ordered. Day team to address additional doses.    Walker Cardona MD, MPH  Internal Medicine

## 2022-12-14 NOTE — PLAN OF CARE
Goal Outcome Evaluation:    Orientation/Cognit AOx4  Observation Goals (Met/ Not Met): Inpatient  Mobility Level/Assist Equipment: SBA/W  Fall Risk (Y/N): Y  Behavior Concerns: None  Tele/VS/O2: VSS on RA  ABNL Lab/BG: WBC 1.8, Hgb 8.7  Diet: Regular  Bowel/Bladder: Continent   Skin Concerns: None  Drains/Devices: PIV SL  Tests/Procedures for next shift: None  Anticipated DC date & active delays: 12/14

## 2022-12-14 NOTE — PROGRESS NOTES
MD Notification    Notified Person: MD    Notified Person Name: Gena    Notification Date/Time: 12/13/22 at 6:47 pm    Notification Interaction: Page    Purpose of Notification: Pt and family would like PTA home medications ordered. If can't be ordered until tomorrows rounds, would especially like Acyclovir ordered for this anton. Please advise.     Orders Received: Orders for Acyclovir received     Comments:

## 2022-12-14 NOTE — DISCHARGE SUMMARY
[facility]  Hospitalist Discharge Summary      Date of Admission:  12/10/2022  Date of Discharge:  12/14/2022  1:57 PM  Discharging Provider: Stella Yu MD  Discharge Service: Hospitalist Service    Discharge Diagnoses   Acute vertigo with the dizziness could be BPPV versus acute stroke  Mechanical fall secondary to above  Acute left frontal maxillary and will ethmoid sinusitis  Acute RSV infection  History of multiple myeloma, currently undergoing weekly chemotherapy on Mondays  Pancytopenia, presumed secondary to multiple myeloma and chemotherapy  Hypertension  Depression    Follow-ups Needed After Discharge   Follow-up Appointments     Follow-up and recommended labs and tests       Follow up with primary care provider, César Chung, within 7 days to   evaluate medication change and for hospital follow- up.  No follow up labs   or test are needed with your primary doctor.  Follow up with Dr. Parry   next Monday for labs and chemotherapy injection.           Unresulted Labs Ordered in the Past 30 Days of this Admission     No orders found from 11/10/2022 to 12/11/2022.          Discharge Disposition   Discharged to home with home health care  Condition at discharge: Stable      Hospital Course   Quiana Dunaway is a 87 year old female with history of multiple myeloma currently undergoing chemotherapy weekly on Mondays last chemo being last Monday today being Saturday, hypertension, history of sick sinus syndrome status post pacemaker placement, thyrotoxicosis presented to the emergency room with complaints of dizziness and vertigo.  She started having feelings of room spinning sensation since yesterday.  Yesterday she felt weak and fell on her bottom on the floor beside her bed.  And she was able to get up and getting to back into bed.  Today she was in her bed and suddenly sat up and moved her head and again became dizzy since then she has been intermittently dizzy and is having  hard time walking.  She otherwise denies any headache.  She felt nauseous and vomited in the ambulance on the way to the emergency room.  No focal weakness no tingling or numbness no speech changes.  CT head and CTA head and neck were negative except acute sinusitis in the multiple sinuses left ethmoid frontal and a complete opacification of the left maxillary sinus with an air-fluid level     Acute vertigo with the dizziness could be BPPV versus acute stroke  Mechanical fall secondary to above;     As needed meclizine to help with symptoms of vertigo and dizziness    MRI brain ordered, this was delayed byscheduling difficulties related to her cardiac pacemaker and staff shortages    MRI was ultimately done 12/13 and shows no acute intracranial process, generalized brain atrophy and presumed microvascular ischemic changes, left frontal sinusitis and a 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 Chapman evaluation and recommendations     Acute left frontal maxillary and ethmoid sinusitis  Acute RSV infection    treated with Unasyn, changed to oral augmentin 12/11     With regard to RSV she is mostly asymptomatic continue symptomatic treatment     History of multiple myeloma currently undergoing weekly chemotherapy on Mondays;  Pancytopenia likely secondary to multiple myeloma on chemotherapy;    Continue her as needed antiemetics    Follow counts    Discussed with Dr. Parry, this weeks injection can be postponed until next week     History of hypertension;    Hold lisinopril    Follow blood pressure readings closely     History of depression;    Continue Remeron     Consultations This Hospital Stay   PHYSICAL THERAPY ADULT IP CONSULT  PHYSICAL THERAPY ADULT IP CONSULT  NEUROLOGY IP CONSULT  CARE MANAGEMENT / SOCIAL WORK IP  CONSULT    Code Status   Full Code    Time Spent on this Encounter   I, Stella Yu MD, personally saw the patient today and spent greater than 30 minutes discharging this patient, and discussed care with her daughter in law, who is at the bedside       Stella Yu MD  [facility] EXTENDED RECOVERY AND SHORT STAY  [address] [place] [id number]  Phone: [phone]  ______________________________________________________________________    Physical Exam   Vital Signs: Temp: 98.3  F (36.8  C) Temp src: Oral BP: 132/62 Pulse: 62   Resp: 16 SpO2: 97 % O2 Device: None (Room air)    Weight: 0 lbs 0 oz  I saw and examined the patient on the date of discharge.         Primary Care Physician   César Chung    Discharge Orders      Medication Therapy Management Referral      Home Care Referral      Reason for your hospital stay    You felt dizzy and nauseated.     Follow-up and recommended labs and tests     Follow up with primary care provider, César Chung, within 7 days to evaluate medication change and for hospital follow- up.  No follow up labs or test are needed with your primary doctor.  Follow up with Dr. Parry next Monday for labs and chemotherapy injection.     Activity    Your activity upon discharge: activity as tolerated     Diet    Follow this diet upon discharge: Orders Placed This Encounter      Regular Diet Adult       Significant Results and Procedures   Most Recent 3 CBC's:Recent Labs   Lab Test 12/14/22  0658 12/11/22  0719 12/10/22  0924   WBC 2.2* 1.8* 1.5*   HGB 9.4* 8.7* 9.4*   MCV 93 93 94    104* 96*     Most Recent 3 BMP's:Recent Labs   Lab Test 12/11/22  0719 12/10/22  0924 12/02/22  1121    138 141   POTASSIUM 3.4 3.5 3.7   CHLORIDE 109 107 108   CO2 26 25 28   BUN 14 19 24   CR 0.66 0.69 0.67   ANIONGAP 6 6 5   HUMBERTO 8.4* 8.3* 8.6   GLC 87 110* 76     Most Recent TSH and T4:Recent Labs   Lab Test 12/10/22  0924 04/07/22  1343  03/04/22  0918   TSH 1.26   < > 5.63*   T4  --   --  0.83    < > = values in this interval not displayed.   ,   Results for orders placed or performed during the hospital encounter of 12/10/22   Head CT w/o contrast    Narrative    EXAM: CT HEAD W/O CONTRAST, CTA HEAD NECK W CONTRAST  LOCATION: [facility]  DATE/TIME: 12/10/2022 11:06 AM    INDICATION: Vertigo.  COMPARISON: None.  CONTRAST: 75 mL Isovue 370  TECHNIQUE: Head and neck CT angiogram with IV contrast. Noncontrast head CT followed by axial helical CT images of the head and neck vessels obtained during the arterial phase of intravenous contrast administration. Axial 2D reconstructed images and   multiplanar 3D MIP reconstructed images of the head and neck vessels were performed by the technologist. Dose reduction techniques were used. All stenosis measurements made according to NASCET criteria unless otherwise specified.    FINDINGS:   NONCONTRAST HEAD CT:   INTRACRANIAL CONTENTS: No intracranial hemorrhage, extraaxial collection, or mass effect.  No CT evidence of acute infarct. Normal parenchymal attenuation. Normal ventricles and sulci.     VISUALIZED ORBITS/SINUSES/MASTOIDS: Prior bilateral cataract surgery. Visualized portions of the orbits are otherwise unremarkable. Circumferential mucosal thickening and air-fluid level in the left maxillary sinus. Complete opacification of the left   ethmoid and frontal sinuses. Mucosal thickening in the left sphenoid sinus locule. No middle ear or mastoid effusion.    BONES/SOFT TISSUES: No acute abnormality.    HEAD CTA:  ANTERIOR CIRCULATION: No stenosis/occlusion, aneurysm, or high flow vascular malformation. Standard [place] anatomy.    POSTERIOR CIRCULATION: No stenosis/occlusion, aneurysm, or high flow vascular malformation. Dominant left and smaller right vertebral artery contribute to a normal basilar artery.     DURAL VENOUS SINUSES: Expected enhancement of the major  dural venous sinuses.    NECK CTA:  RIGHT CAROTID: No measurable stenosis or dissection.    LEFT CAROTID: No measurable stenosis or dissection.    VERTEBRAL ARTERIES: No focal stenosis or dissection. Dominant left and smaller right vertebral arteries.    AORTIC ARCH: Classic aortic arch anatomy with no significant stenosis at the origin of the great vessels.    NONVASCULAR STRUCTURES: Heterogeneous nodule in the left thyroid gland measures 2.4 cm.      Impression    IMPRESSION:   HEAD CT:  1.  No acute intracranial process.  2.  Opacified left ethmoid and frontal sinuses and subtotal opacification of the left maxillary sinus with an air-fluid level.     HEAD CTA:   1.  No significant stenosis, aneurysm, or high flow vascular malformation identified.  2.  Variant [place] anatomy as above.    NECK CTA:  1.  No significant stenosis of the bilateral internal carotid arteries based on NASCET criteria.       CTA Head Neck with Contrast    Narrative    EXAM: CT HEAD W/O CONTRAST, CTA HEAD NECK W CONTRAST  LOCATION: [facility]  DATE/TIME: 12/10/2022 11:06 AM    INDICATION: Vertigo.  COMPARISON: None.  CONTRAST: 75 mL Isovue 370  TECHNIQUE: Head and neck CT angiogram with IV contrast. Noncontrast head CT followed by axial helical CT images of the head and neck vessels obtained during the arterial phase of intravenous contrast administration. Axial 2D reconstructed images and   multiplanar 3D MIP reconstructed images of the head and neck vessels were performed by the technologist. Dose reduction techniques were used. All stenosis measurements made according to NASCET criteria unless otherwise specified.    FINDINGS:   NONCONTRAST HEAD CT:   INTRACRANIAL CONTENTS: No intracranial hemorrhage, extraaxial collection, or mass effect.  No CT evidence of acute infarct. Normal parenchymal attenuation. Normal ventricles and sulci.     VISUALIZED ORBITS/SINUSES/MASTOIDS: Prior bilateral cataract  surgery. Visualized portions of the orbits are otherwise unremarkable. Circumferential mucosal thickening and air-fluid level in the left maxillary sinus. Complete opacification of the left   ethmoid and frontal sinuses. Mucosal thickening in the left sphenoid sinus locule. No middle ear or mastoid effusion.    BONES/SOFT TISSUES: No acute abnormality.    HEAD CTA:  ANTERIOR CIRCULATION: No stenosis/occlusion, aneurysm, or high flow vascular malformation. Standard [place] anatomy.    POSTERIOR CIRCULATION: No stenosis/occlusion, aneurysm, or high flow vascular malformation. Dominant left and smaller right vertebral artery contribute to a normal basilar artery.     DURAL VENOUS SINUSES: Expected enhancement of the major dural venous sinuses.    NECK CTA:  RIGHT CAROTID: No measurable stenosis or dissection.    LEFT CAROTID: No measurable stenosis or dissection.    VERTEBRAL ARTERIES: No focal stenosis or dissection. Dominant left and smaller right vertebral arteries.    AORTIC ARCH: Classic aortic arch anatomy with no significant stenosis at the origin of the great vessels.    NONVASCULAR STRUCTURES: Heterogeneous nodule in the left thyroid gland measures 2.4 cm.      Impression    IMPRESSION:   HEAD CT:  1.  No acute intracranial process.  2.  Opacified left ethmoid and frontal sinuses and subtotal opacification of the left maxillary sinus with an air-fluid level.     HEAD CTA:   1.  No significant stenosis, aneurysm, or high flow vascular malformation identified.  2.  Variant [place] anatomy as above.    NECK CTA:  1.  No significant stenosis of the bilateral internal carotid arteries based on NASCET criteria.       MR Brain w/o & w Contrast    Narrative    EXAM: MR BRAIN W/O and W CONTRAST  LOCATION: [facility]  DATE/TIME: 12/13/2022 5:16 PM    INDICATION: vertigo r o acute stroke  COMPARISON: CT head 12/10/2022  CONTRAST: 6mL Gadavist  TECHNIQUE: Routine multiplanar  multisequence head MRI without and with intravenous contrast.    FINDINGS:  INTRACRANIAL CONTENTS: No acute or subacute infarct. No mass, acute hemorrhage, or extra-axial fluid collections. Scattered nonspecific T2/FLAIR hyperintensities within the cerebral white matter most consistent with mild chronic microvascular ischemic   change. Mild generalized cerebral atrophy. No hydrocephalus. Normal position of the cerebellar tonsils. There is a homogeneously enhancing extra-axial left parafalcine mass measuring 1.5 cm AP by 1.1 cm transverse compatible with meningioma. Negative for   associated parenchymal edema.    SELLA: No abnormality accounting for technique.    OSSEOUS STRUCTURES/SOFT TISSUES: Normal marrow signal. The major intracranial vascular flow voids are maintained. Incidentally noted is disc bulge producing mild to moderate spinal canal stenosis at C3-C4.    ORBITS: Prior bilateral cataract surgery. Visualized portions of the orbits are otherwise unremarkable.     SINUSES/MASTOIDS: There is complete obstruction of the left frontal sinus. There is extensive mucosal thickening of the left ethmoid air cells. There is moderate mucosal thickening of the left maxillary sinus. No middle ear or mastoid effusion.       Impression    IMPRESSION:  1.  No acute intracranial process.  2.  Generalized brain atrophy and presumed microvascular ischemic changes as detailed above.  3.  1.5 cm left parafalcine meningioma.  4.  Obstructive pattern left frontal sinusitis.     *Note: Due to a large number of results and/or encounters for the requested time period, some results have not been displayed. A complete set of results can be found in Results Review.       Discharge Medications   Discharge Medication List as of 12/14/2022 12:11 PM      START taking these medications    Details   amoxicillin-clavulanate (AUGMENTIN) 875-125 MG tablet Take 1 tablet by mouth every 12 hours for 8 days, Disp-16 tablet, R-0, E-Prescribe       meclizine (ANTIVERT) 25 MG tablet Take 1 tablet (25 mg) by mouth every 6 hours as needed for dizziness, Disp-10 tablet, R-0, E-Prescribe         CONTINUE these medications which have CHANGED    Details   !! dexamethasone (DECADRON) 4 MG tablet Take 1 tablet (4 mg) by mouth once a week ON SUNDAYS, No Print Out       !! - Potential duplicate medications found. Please discuss with provider.      CONTINUE these medications which have NOT CHANGED    Details   acyclovir (ZOVIRAX) 400 MG tablet Take 1 tablet (400 mg) by mouth 2 times daily Viral Prophylaxis., Disp-60 tablet, R-5, E-Prescribe      aspirin 81 MG chewable tablet Take 81 mg by mouth daily , Historical      BETA BLOCKER NOT PRESCRIBED (INTENTIONAL) Reason Beta Blocker was Not Prescribed: Other (see comments) / Sick sinus syndromeNo Print Out      cyanocobalamin (VITAMIN B-12) 1000 MCG tablet Take 1 tablet (1,000 mcg) by mouth daily, Disp-90 tablet, R-2, E-Prescribe      Daratumumab (DARZALEX IV) Q weekly injection, given on Mondays at cancer [facility], Historical      !! dexamethasone (DECADRON) 4 MG tablet Take 1 tablet (4 mg) by mouth once a week, Disp-16 tablet, R-3, E-Prescribe      famotidine (PEPCID) 40 MG tablet Take 1 tablet (40 mg) by mouth 2 times daily, Disp-180 tablet, R-3, E-Prescribe      furosemide (LASIX) 20 MG tablet Take 0.5 tablets (10 mg) by mouth daily as needed (For BP greater than 160/90), Disp-90 tablet, R-3, E-Prescribe      LENalidomide (REVLIMID) 10 MG CAPS capsule Take 1 capsule (10 mg) by mouth daily for 14 days . Then take 7 days off. Take at the same time each day. Do not open, break or chew the capsule. Swallow whole, with water., Disp-14 capsule, R-0, E-PrescribeREMS Authorization #:[id number] obtained on 12/5/ 2022      lisinopril (ZESTRIL) 5 MG tablet Take 1 tablet (5 mg) by mouth daily, Disp-90 tablet, R-3, E-Prescribe      Loperamide HCl (IMODIUM A-D PO) Take 1 tablet by mouth as needed , Historical      LORazepam  (ATIVAN) 0.5 MG tablet Take 1 tablet (0.5 mg) by mouth every 6 hours as needed for anxiety, Disp-30 tablet, R-3, E-Prescribe      methimazole (TAPAZOLE) 5 MG tablet Take 0.5 tablets (2.5 mg) by mouth daily, Disp-45 tablet, R-3, E-Prescribe      mirabegron (MYRBETRIQ) 50 MG 24 hr tablet Take 1 tablet (50 mg) by mouth daily Last refill needs  To be seen, Disp-90 tablet, R-3, E-Prescribe      mirtazapine (REMERON) 7.5 MG tablet Take 2 tablets (15 mg) by mouth At Bedtime, Disp-90 tablet, R-3, Historical      nitroglycerin (NITROSTAT) 0.4 MG sublingual tablet For chest pain place 1 tablet under the tongue every 5 minutes for 3 doses. If symptoms persist 5 minutes after 1st dose call 911., Disp-25 tablet, R-0, E-Prescribe      ondansetron (ZOFRAN) 8 MG tablet Take 1 tablet (8 mg) by mouth every 8 hours as needed for nausea, Disp-90 tablet, R-11, E-Prescribe      potassium chloride ER (KLOR-CON M) 10 MEQ CR tablet Take 1 tablet (10 mEq) by mouth daily, Disp-90 tablet, R-3, E-Prescribe      prochlorperazine (COMPAZINE) 10 MG tablet Take 1 tablet (10 mg) by mouth every 6 hours as needed (Nausea/Vomiting), Disp-30 tablet, R-6, E-Prescribe      triamcinolone (KENALOG) 0.1 % paste Apply to tongue lesion twice daily for one week.Disp-5 g, [id number]       !! - Potential duplicate medications found. Please discuss with provider.        Allergies   Allergies   Allergen Reactions     Levaquin [Levofloxacin] Other (See Comments)     Tingling in feet after 1 dose on 8/7/19

## 2022-12-14 NOTE — PLAN OF CARE
Goal Outcome Evaluation:      Plan of Care Reviewed With: patient    Orientation/Cognit AOx4  Observation Goals (Met/ Not Met): Inpatient  Mobility Level/Assist Equipment: SBA/W  Fall Risk (Y/N): Y  Behavior Concerns: None  Tele/VS/O2: VSS on RA  ABNL Lab/BG: WBC 2.2; Hgb 9.4  Diet: Regular  Bowel/Bladder: Continent   Skin Concerns: None  Drains/Devices: PIV removed  Tests/Procedures for next shift: None  Anticipated DC date & active delays: Today  Patient Stated Goal for Today: Discharge today

## 2022-12-15 ENCOUNTER — TELEPHONE (OUTPATIENT)
Dept: FAMILY MEDICINE | Facility: CLINIC | Age: 87
End: 2022-12-15

## 2022-12-15 ENCOUNTER — TRANSFERRED RECORDS (OUTPATIENT)
Dept: HEALTH INFORMATION MANAGEMENT | Facility: CLINIC | Age: 87
End: 2022-12-15

## 2022-12-15 NOTE — TELEPHONE ENCOUNTER
Reason for Call:  Home Health Care    Jalyn with [facility] called regarding (reason for call):     Orders are needed for this patient.    PT: eval and treat for vestibular therapy.     Skilled Nursing: every other week for 8 weeks and 3 times PRN    Pt Provider: Juliet    Phone Number Homecare Nurse can be reached at: [phone]    Can we leave a detailed message on this number? YES    Call taken on 12/15/2022 at 2:55 PM by Maria E Balderas

## 2022-12-16 ENCOUNTER — PATIENT OUTREACH (OUTPATIENT)
Dept: CARE COORDINATION | Facility: CLINIC | Age: 87
End: 2022-12-16

## 2022-12-16 ENCOUNTER — TELEPHONE (OUTPATIENT)
Dept: FAMILY MEDICINE | Facility: CLINIC | Age: 87
End: 2022-12-16

## 2022-12-16 NOTE — TELEPHONE ENCOUNTER
Writer called and left detailed VM for Jalyn (FAUSTINO [facility]) on confidential VM of PCP's approval of requested home care orders.    Advised Jalyn to call back with further questions/concerns.    Signing encounter.    Silverio Herrera RN  [facility]

## 2022-12-16 NOTE — PROGRESS NOTES
Clinic Care Coordination Contact  [facility]/Voicemail       Clinical Data: Care Coordinator Outreach  Outreach attempted x 2.  Left message on patient's voicemail with call back information and requested return call.  Plan: Care Coordinator will do no further outreaches at this time.    Dulce Maria Marquez, [facility]  [phone]  [facility]

## 2022-12-16 NOTE — TELEPHONE ENCOUNTER
MTM referral from: Transitions of Care (recent hospital discharge or ED visit)    MTM referral outreach attempt #2 on December 16, 2022 at 8:33 AM      Outcome: Patient not reachable after several attempts, will route to MT Pharmacist/Provider as an FYI.  [facility] scheduling number is [phone].  Thank you for the referral.    Patient has CARLEEN/VBC coverage    Simone Monzon MT

## 2022-12-18 ENCOUNTER — MYC MEDICAL ADVICE (OUTPATIENT)
Dept: FAMILY MEDICINE | Facility: CLINIC | Age: 87
End: 2022-12-18

## 2022-12-18 DIAGNOSIS — R42 VERTIGO: Primary | ICD-10-CM

## 2022-12-19 ENCOUNTER — ONCOLOGY VISIT (OUTPATIENT)
Dept: ONCOLOGY | Facility: CLINIC | Age: 87
End: 2022-12-19
Attending: INTERNAL MEDICINE
Payer: MEDICARE

## 2022-12-19 ENCOUNTER — TELEPHONE (OUTPATIENT)
Dept: FAMILY MEDICINE | Facility: CLINIC | Age: 87
End: 2022-12-19

## 2022-12-19 ENCOUNTER — LAB (OUTPATIENT)
Dept: INFUSION THERAPY | Facility: CLINIC | Age: 87
End: 2022-12-19
Attending: INTERNAL MEDICINE
Payer: MEDICARE

## 2022-12-19 DIAGNOSIS — E05.20 TOXIC MULTINODUL GOITER: ICD-10-CM

## 2022-12-19 DIAGNOSIS — C90.00 MULTIPLE MYELOMA NOT HAVING ACHIEVED REMISSION (H): Primary | ICD-10-CM

## 2022-12-19 LAB
BASOPHILS # BLD AUTO: 0 10E3/UL (ref 0–0.2)
BASOPHILS NFR BLD AUTO: 0 %
EOSINOPHIL # BLD AUTO: 0 10E3/UL (ref 0–0.7)
EOSINOPHIL NFR BLD AUTO: 1 %
ERYTHROCYTE [DISTWIDTH] IN BLOOD BY AUTOMATED COUNT: 15.9 % (ref 10–15)
HCT VFR BLD AUTO: 32 % (ref 35–47)
HGB BLD-MCNC: 10.4 G/DL (ref 11.7–15.7)
IMM GRANULOCYTES # BLD: 0 10E3/UL
IMM GRANULOCYTES NFR BLD: 1 %
LYMPHOCYTES # BLD AUTO: 1 10E3/UL (ref 0.8–5.3)
LYMPHOCYTES NFR BLD AUTO: 32 %
MCH RBC QN AUTO: 30.4 PG (ref 26.5–33)
MCHC RBC AUTO-ENTMCNC: 32.5 G/DL (ref 31.5–36.5)
MCV RBC AUTO: 94 FL (ref 78–100)
MONOCYTES # BLD AUTO: 0.3 10E3/UL (ref 0–1.3)
MONOCYTES NFR BLD AUTO: 9 %
NEUTROPHILS # BLD AUTO: 1.8 10E3/UL (ref 1.6–8.3)
NEUTROPHILS NFR BLD AUTO: 57 %
NRBC # BLD AUTO: 0 10E3/UL
NRBC BLD AUTO-RTO: 0 /100
PLATELET # BLD AUTO: 235 10E3/UL (ref 150–450)
RBC # BLD AUTO: 3.42 10E6/UL (ref 3.8–5.2)
TOTAL PROTEIN SERUM FOR ELP: 5.8 G/DL (ref 6.4–8.3)
TSH SERPL DL<=0.005 MIU/L-ACNC: 3.66 MU/L (ref 0.4–4)
WBC # BLD AUTO: 3.2 10E3/UL (ref 4–11)

## 2022-12-19 PROCEDURE — 85004 AUTOMATED DIFF WBC COUNT: CPT | Performed by: INTERNAL MEDICINE

## 2022-12-19 PROCEDURE — 84443 ASSAY THYROID STIM HORMONE: CPT | Performed by: INTERNAL MEDICINE

## 2022-12-19 PROCEDURE — G0463 HOSPITAL OUTPT CLINIC VISIT: HCPCS | Performed by: INTERNAL MEDICINE

## 2022-12-19 PROCEDURE — 84165 PROTEIN E-PHORESIS SERUM: CPT | Mod: TC | Performed by: PATHOLOGY

## 2022-12-19 PROCEDURE — 82784 ASSAY IGA/IGD/IGG/IGM EACH: CPT | Performed by: INTERNAL MEDICINE

## 2022-12-19 PROCEDURE — 36415 COLL VENOUS BLD VENIPUNCTURE: CPT

## 2022-12-19 PROCEDURE — 83521 IG LIGHT CHAINS FREE EACH: CPT | Performed by: INTERNAL MEDICINE

## 2022-12-19 PROCEDURE — 84155 ASSAY OF PROTEIN SERUM: CPT | Performed by: INTERNAL MEDICINE

## 2022-12-19 PROCEDURE — 99214 OFFICE O/P EST MOD 30 MIN: CPT | Performed by: INTERNAL MEDICINE

## 2022-12-19 NOTE — TELEPHONE ENCOUNTER
12-19-22 Tel Enc  = Appt for Hosp f/u  12-18-22 MY CHART message = request Rx for Walker    I do not see any openings with Dr. Chung till Wed 21st  for a Hosp f/u d/c 14th (RSV, vertigo, MM) .      Checking with Dr. Chung if sched with team?    Renetta TRAVIS MA

## 2022-12-19 NOTE — TELEPHONE ENCOUNTER
21st should be OK, if symptoms too severe to wait until then, then she could see a team provider, I wrote an prescription for the walker

## 2022-12-19 NOTE — TELEPHONE ENCOUNTER
Reason for Call:  Appointment Request    Patient requesting this type of appt:  Hospital/ED Follow-Up     Requested provider: César Chung    Reason patient unable to be scheduled: Not within requested timeframe    When does patient want to be seen/preferred time: 1-2 days    Comments: Pt prefers Dr Chung, particularly, however is looking to be seen as soon as possible.   Hosp follow up [facility] 12/14 dizzy & fall (ongoing weakness/dizzy and bladder concernss)    Could we send this information to you in [facility] or would you prefer to receive a phone call?:   Patient would prefer a phone call   Okay to leave a detailed message?: Yes at Home number on file [phone], or [phone] (home)    Call taken on 12/19/2022 at 9:43 AM by Caroline Flowers

## 2022-12-19 NOTE — TELEPHONE ENCOUNTER
12-19-22 Tel Enc  = Appt for Hosp f/u  12-18-22 MY CHART message = request Rx for Walker    I do not see any openings with Dr. Chung till Wed 21st  for a Hosp f/u d/c 14th (RSV, vertigo, MM) .      Dr. Chung, Team sooner?     Renetta TRAVIS MA

## 2022-12-19 NOTE — PROGRESS NOTES
Medical Assistant Note:  Quiana Dunaway presents today for lab draw.    Patient seen by provider today: No.   present during visit today: Not Applicable.    Concerns: No Concerns.    Procedure:  Lab draw site: LAC, Needle type: BF, Gauge: 23. Gauze and coban applied    Post Assessment:  Labs drawn without difficulty: Yes.    Discharge Plan:  Departure Mode: Ambulatory.    Face to Face Time: 5.    Sultana Jones CMA

## 2022-12-19 NOTE — TELEPHONE ENCOUNTER
Sent patient myChart msg.  Bella Chowdhury, PharmD, [facility]  Medication Therapy Management Provider  Pager: [phone]

## 2022-12-19 NOTE — LETTER
12/19/2022         RE: Quiana Dunaway  [address] [facility] [address]        Dear Colleague,    Thank you for referring your patient, Quiana Dunaway, to the [facility] [place]. Please see a copy of my visit note below.    HEMATOLOGY HISTORY: Mrs. Dunaway is a lady with multiple myeloma (IgG kappa). High risk, t(14;16).  1. On 05/01/2017, WBC of 3.4, hemoglobin of 10.2 and platelet of 154.  2. SPEP on 07/07/2017 revealed M-spike of 1.8.  3. Bone marrow biopsy on 07/12/2017 reveals kappa monotypic plasma cell population consistent with multiple myeloma.  Bone marrow is 70% cellular.  Plasma cell is 50-60%.     -There is gain of chromosome 1, 5, 9, 15, and 17.  There is translocation 14;16.   4.  On 07/18/2017:  -M-spike of 1.9.   -Immunofixation reveals monoclonal IgG kappa.    -IgG level of 2970.  IgA of 15 and IgM of 175.    -Kappa free light chain of 67.7.  Lambda free light chain of 1.42.  Ratio of kappa to lambda of 47.71.    -Beta 2 microglobulin of 3.4.   5. PET scan on 07/24/2017 reveals several focal areas of increased FDG uptake consistent with multiple myeloma.  There is probable epithelial cyst in tail of the pancreas.  No associated abnormal FDG activity.  Left thyroid cysts or nodules without any FDG activity.  There is a 0.3 cm left upper lobe lung nodule.   6.  Velcade, Revlimid and dexamethasone between on 08/14/2017 and 04/30/2018. Velcade stopped.   -Revlimid and dexamethasone continued.  -Revlimid held between 12/27/2021 and 03/08/2022.  -Daratumumab added on 11/14/2022.  7. CT chest, abdomen, and pelvis on 12/30/2021 does not reveal any malignancy.     SUBJECTIVE:  Ms. Dunaway is an 87-year-old female with multiple myeloma on daratumumab, revlimid and dexamethasone. Zometa is on hold for dental work.    Patient was in hospital between December 10 and December 14 for vertigo and fall.  She is still recovering from it.  Patient said that she is still has  vertigo.  She has sensation of room spinning around.  It is improving.    She feels weak.  No headache.  No chest pain.  No shortness of breath at rest.  No nausea or vomiting.  Appetite is fairly good.  All other review of system negative.     PHYSICAL EXAMINATION:   GENERAL:  Alert and oriented x 3.  Not in any distress.  VITAL SIGNS:  Reviewed.       Rest of the system is not examined.     LABORATORY: CBC reviewed.      ASSESSMENT:    1.  An 87-year-old female with multiple myeloma on Revlimid, daratumumab and dexamethasone.  2.  Normocytic anemia, stable.  3.  Fatigue secondary to her age, myeloma and multiple other medical problem.  4.  Recent hospitalization for vertigo.  5.  Leukopenia.     PLAN:    1.  Patient still has vertigo.  Patient said it is improving but not resolved.  She also feels very weak.    Explained to her that I would recommend delaying the treatment by another week.  She is agreeable for it.  We will give her daratumumab and dexamethasone next week.    2.  Labs were reviewed.  Her leukopenia and anemia are overall stable. We will continue to monitor it.    3.  She will be seen in the clinic as scheduled.  Advised her to call us with any questions or concerns.     TOTAL VISIT TIME: 20 minutes.  Time spent in today's visit, review of chart/investigations today, monitoring for toxicity of high risk medication and documentation today.      Again, thank you for allowing me to participate in the care of your patient.        Sincerely,        Alex Parry MD

## 2022-12-20 ENCOUNTER — TELEPHONE (OUTPATIENT)
Dept: FAMILY MEDICINE | Facility: CLINIC | Age: 87
End: 2022-12-20

## 2022-12-20 LAB
IGG SERPL-MCNC: 1004 MG/DL (ref 610–1616)
KAPPA LC FREE SER-MCNC: 54.57 MG/DL (ref 0.33–1.94)
KAPPA LC FREE/LAMBDA FREE SER NEPH: 147.49 {RATIO} (ref 0.26–1.65)
LAMBDA LC FREE SERPL-MCNC: 0.37 MG/DL (ref 0.57–2.63)

## 2022-12-20 NOTE — TELEPHONE ENCOUNTER
1w4, eow 3 PT requested  Verbal given for requested homecare orders.  Homecare to send orders over for PCP signature.  Arleth Richards, RN  [facility] RN Triage Team

## 2022-12-21 ENCOUNTER — OFFICE VISIT (OUTPATIENT)
Dept: FAMILY MEDICINE | Facility: CLINIC | Age: 87
End: 2022-12-21
Payer: MEDICARE

## 2022-12-21 VITALS
BODY MASS INDEX: 23.92 KG/M2 | WEIGHT: 130 LBS | DIASTOLIC BLOOD PRESSURE: 70 MMHG | RESPIRATION RATE: 18 BRPM | OXYGEN SATURATION: 96 % | HEART RATE: 93 BPM | TEMPERATURE: 97.9 F | HEIGHT: 62 IN | SYSTOLIC BLOOD PRESSURE: 117 MMHG

## 2022-12-21 DIAGNOSIS — R42 VERTIGO: Primary | ICD-10-CM

## 2022-12-21 DIAGNOSIS — B37.0 THRUSH: ICD-10-CM

## 2022-12-21 LAB
ALBUMIN SERPL ELPH-MCNC: 3.4 G/DL (ref 3.7–5.1)
ALPHA1 GLOB SERPL ELPH-MCNC: 0.4 G/DL (ref 0.2–0.4)
ALPHA2 GLOB SERPL ELPH-MCNC: 0.7 G/DL (ref 0.5–0.9)
B-GLOBULIN SERPL ELPH-MCNC: 0.6 G/DL (ref 0.6–1)
GAMMA GLOB SERPL ELPH-MCNC: 0.8 G/DL (ref 0.7–1.6)
M PROTEIN SERPL ELPH-MCNC: 0.6 G/DL
PROT PATTERN SERPL ELPH-IMP: ABNORMAL

## 2022-12-21 PROCEDURE — 84165 PROTEIN E-PHORESIS SERUM: CPT | Mod: 26

## 2022-12-21 PROCEDURE — 99495 TRANSJ CARE MGMT MOD F2F 14D: CPT | Performed by: INTERNAL MEDICINE

## 2022-12-21 RX ORDER — NYSTATIN 100000/ML
500000 SUSPENSION, ORAL (FINAL DOSE FORM) ORAL 4 TIMES DAILY
Qty: 400 ML | Refills: 1 | Status: SHIPPED | OUTPATIENT
Start: 2022-12-21 | End: 2023-01-12

## 2022-12-21 ASSESSMENT — PAIN SCALES - GENERAL: PAINLEVEL: NO PAIN (0)

## 2022-12-21 NOTE — PROGRESS NOTES
Assessment & Plan     Vertigo  In retrospect, this is probably BPPV, continue canalith repositioning maneuvers with home physical therapy, discussed the benign, self-limited nature of this condition.  Recommended that they avoid using meclizine if possible    Thrush  This is probably a side effect of the Augmentin course.  Start topical nystatin.  - nystatin (MYCOSTATIN) 515447 UNIT/ML suspension; Take 5 mLs (500,000 Units) by mouth 4 times daily      33 minutes spent on the date of the encounter doing chart review, history and exam, documentation and further activities per the note     MED REC REQUIRED  Post Medication Reconciliation Status: discharge medications reconciled, continue medications without change      Return in about 26 days (around 1/16/2023) for recheck.  Patient instructed to return to clinic or contact us sooner if symptoms worsen or new symptoms develop.     César Chung MD  [facility] MAGALY Araya is a 87 year old accompanied by her Family member, presenting for the following health issues:  No chief complaint on file.      [facility]       Hospital Follow-up Visit:    Hospital/Nursing Home/IP Rehab Facility: [facility]  Date of Admission: 12/10/2022  Date of Discharge: 12/14/2022  Reason(s) for Admission: Acute vertigo with the dizziness could be BPPV versus acute stroke  Mechanical fall secondary to above  Acute left frontal maxillary and will ethmoid sinusitis  Acute RSV infection  History of multiple myeloma, currently undergoing weekly chemotherapy on Mondays  Pancytopenia, presumed secondary to multiple myeloma and chemotherapy  Hypertension  Depression    Was your hospitalization related to COVID-19? No   Problems taking medications regularly:  None  Medication changes since discharge: None  Problems adhering to non-medication therapy:  None    Summary of hospitalization:  [facility] discharge summary  "reviewed  Diagnostic Tests/Treatments reviewed.  Follow up needed: PT  Other Healthcare Providers Involved in Patient s Care:         Homecare  Update since discharge: improved.   Plan of care communicated with patient and family     Very pleasant 87-year-old female with history of myeloma on chemotherapy, toxic multinodular goiter, mild anxiety, hypertension, pacemaker in place, chronic diastolic congestive heart failure    Here with her family member after being hospitalized following a fall associated with intermittent vertigo    Brain MRI did not show any evidence of stroke but showed sinus opacification and she was treated with Augmentin for presumed sinusitis and had an incidentally positive RSV test    Her vertigo is slowly improving and she is working with home PT on canalith repositioning maneuvers as there was some suspicion for BPPV    Since hospital discharge, she noticed a film in her mouth that makes it difficult for her to swallow      Review of Systems         Objective    /70 (BP Location: Right arm, Patient Position: Sitting, Cuff Size: Adult Regular)   Pulse 93   Temp 97.9  F (36.6  C) (Temporal)   Resp 18   Ht 1.575 m (5' 2\")   Wt 59 kg (130 lb)   SpO2 96%   BMI 23.78 kg/m    Body mass index is 23.78 kg/m .  Physical Exam   General: This is a frail elderly female but no different than previous exams.  HEENT: There is mild oral thrush noted neurological: Alert and oriented to person place and time, cranial nerves II through XII appear grossly intact, no pronator drift, cerebellar testing within normal limits, Hallpike maneuver produces torsional nystagmus with head turn to right, her gait is steady and she now has a walker.                    "

## 2022-12-22 ENCOUNTER — MEDICAL CORRESPONDENCE (OUTPATIENT)
Dept: HEALTH INFORMATION MANAGEMENT | Facility: CLINIC | Age: 87
End: 2022-12-22

## 2022-12-22 DIAGNOSIS — Z53.9 DIAGNOSIS NOT YET DEFINED: Primary | ICD-10-CM

## 2022-12-22 PROCEDURE — G0180 MD CERTIFICATION HHA PATIENT: HCPCS | Performed by: INTERNAL MEDICINE

## 2022-12-25 NOTE — PROGRESS NOTES
HEMATOLOGY HISTORY: Mrs. Dunaway is a lady with multiple myeloma (IgG kappa). High risk, t(14;16).  1. On 05/01/2017, WBC of 3.4, hemoglobin of 10.2 and platelet of 154.  2. SPEP on 07/07/2017 revealed M-spike of 1.8.  3. Bone marrow biopsy on 07/12/2017 reveals kappa monotypic plasma cell population consistent with multiple myeloma.  Bone marrow is 70% cellular.  Plasma cell is 50-60%.     -There is gain of chromosome 1, 5, 9, 15, and 17.  There is translocation 14;16.   4.  On 07/18/2017:  -M-spike of 1.9.   -Immunofixation reveals monoclonal IgG kappa.    -IgG level of 2970.  IgA of 15 and IgM of 175.    -Kappa free light chain of 67.7.  Lambda free light chain of 1.42.  Ratio of kappa to lambda of 47.71.    -Beta 2 microglobulin of 3.4.   5. PET scan on 07/24/2017 reveals several focal areas of increased FDG uptake consistent with multiple myeloma.  There is probable epithelial cyst in tail of the pancreas.  No associated abnormal FDG activity.  Left thyroid cysts or nodules without any FDG activity.  There is a 0.3 cm left upper lobe lung nodule.   6.  Velcade, Revlimid and dexamethasone between on 08/14/2017 and 04/30/2018. Velcade stopped.   -Revlimid and dexamethasone continued.  -Revlimid held between 12/27/2021 and 03/08/2022.  -Daratumumab added on 11/14/2022.  7. CT chest, abdomen, and pelvis on 12/30/2021 does not reveal any malignancy.     SUBJECTIVE:  Ms. Dunaway is an 87-year-old female with multiple myeloma on daratumumab, revlimid and dexamethasone. Zometa is on hold for dental work.    Patient was in hospital between December 10 and December 14 for vertigo and fall.  She is still recovering from it.  Patient said that she is still has vertigo.  She has sensation of room spinning around.  It is improving.    She feels weak.  No headache.  No chest pain.  No shortness of breath at rest.  No nausea or vomiting.  Appetite is fairly good.  All other review of system negative.     PHYSICAL  EXAMINATION:   GENERAL:  Alert and oriented x 3.  Not in any distress.  VITAL SIGNS:  Reviewed.       Rest of the system is not examined.     LABORATORY: CBC reviewed.      ASSESSMENT:    1.  An 87-year-old female with multiple myeloma on Revlimid, daratumumab and dexamethasone.  2.  Normocytic anemia, stable.  3.  Fatigue secondary to her age, myeloma and multiple other medical problem.  4.  Recent hospitalization for vertigo.  5.  Leukopenia.     PLAN:    1.  Patient still has vertigo.  Patient said it is improving but not resolved.  She also feels very weak.    Explained to her that I would recommend delaying the treatment by another week.  She is agreeable for it.  We will give her daratumumab and dexamethasone next week.    2.  Labs were reviewed.  Her leukopenia and anemia are overall stable. We will continue to monitor it.    3.  She will be seen in the clinic as scheduled.  Advised her to call us with any questions or concerns.     TOTAL VISIT TIME: 20 minutes.  Time spent in today's visit, review of chart/investigations today, monitoring for toxicity of high risk medication and documentation today.

## 2022-12-26 ENCOUNTER — LAB (OUTPATIENT)
Dept: INFUSION THERAPY | Facility: CLINIC | Age: 87
End: 2022-12-26
Attending: INTERNAL MEDICINE
Payer: MEDICARE

## 2022-12-26 ENCOUNTER — ONCOLOGY VISIT (OUTPATIENT)
Dept: ONCOLOGY | Facility: CLINIC | Age: 87
End: 2022-12-26
Attending: INTERNAL MEDICINE
Payer: MEDICARE

## 2022-12-26 VITALS
HEIGHT: 62 IN | SYSTOLIC BLOOD PRESSURE: 121 MMHG | BODY MASS INDEX: 23.45 KG/M2 | HEART RATE: 79 BPM | RESPIRATION RATE: 16 BRPM | DIASTOLIC BLOOD PRESSURE: 77 MMHG | TEMPERATURE: 98.6 F | OXYGEN SATURATION: 100 % | WEIGHT: 127.4 LBS

## 2022-12-26 DIAGNOSIS — C90.00 MULTIPLE MYELOMA NOT HAVING ACHIEVED REMISSION (H): Primary | ICD-10-CM

## 2022-12-26 DIAGNOSIS — K13.79 MOUTH SORES: ICD-10-CM

## 2022-12-26 LAB
ALBUMIN SERPL-MCNC: 3.2 G/DL (ref 3.4–5)
ALP SERPL-CCNC: 122 U/L (ref 40–150)
ALT SERPL W P-5'-P-CCNC: 16 U/L (ref 0–50)
ANION GAP SERPL CALCULATED.3IONS-SCNC: 6 MMOL/L (ref 3–14)
AST SERPL W P-5'-P-CCNC: 12 U/L (ref 0–45)
BASOPHILS # BLD AUTO: 0 10E3/UL (ref 0–0.2)
BASOPHILS NFR BLD AUTO: 1 %
BILIRUB SERPL-MCNC: 0.8 MG/DL (ref 0.2–1.3)
BUN SERPL-MCNC: 21 MG/DL (ref 7–30)
CALCIUM SERPL-MCNC: 8.9 MG/DL (ref 8.5–10.1)
CHLORIDE BLD-SCNC: 109 MMOL/L (ref 94–109)
CO2 SERPL-SCNC: 25 MMOL/L (ref 20–32)
CREAT SERPL-MCNC: 0.69 MG/DL (ref 0.52–1.04)
DACRYOCYTES BLD QL SMEAR: SLIGHT
ELLIPTOCYTES BLD QL SMEAR: ABNORMAL
EOSINOPHIL # BLD AUTO: 0 10E3/UL (ref 0–0.7)
EOSINOPHIL NFR BLD AUTO: 0 %
ERYTHROCYTE [DISTWIDTH] IN BLOOD BY AUTOMATED COUNT: 15.2 % (ref 10–15)
GFR SERPL CREATININE-BSD FRML MDRD: 84 ML/MIN/1.73M2
GLUCOSE BLD-MCNC: 100 MG/DL (ref 70–99)
HCT VFR BLD AUTO: 30 % (ref 35–47)
HGB BLD-MCNC: 10.2 G/DL (ref 11.7–15.7)
IMM GRANULOCYTES # BLD: 0 10E3/UL
IMM GRANULOCYTES NFR BLD: 0 %
LYMPHOCYTES # BLD AUTO: 2 10E3/UL (ref 0.8–5.3)
LYMPHOCYTES NFR BLD AUTO: 36 %
MCH RBC QN AUTO: 30.7 PG (ref 26.5–33)
MCHC RBC AUTO-ENTMCNC: 34 G/DL (ref 31.5–36.5)
MCV RBC AUTO: 90 FL (ref 78–100)
MONOCYTES # BLD AUTO: 1.1 10E3/UL (ref 0–1.3)
MONOCYTES NFR BLD AUTO: 21 %
NEUTROPHILS # BLD AUTO: 2.3 10E3/UL (ref 1.6–8.3)
NEUTROPHILS NFR BLD AUTO: 42 %
NRBC # BLD AUTO: 0 10E3/UL
NRBC BLD AUTO-RTO: 0 /100
PLAT MORPH BLD: ABNORMAL
PLATELET # BLD AUTO: 241 10E3/UL (ref 150–450)
POTASSIUM BLD-SCNC: 3.8 MMOL/L (ref 3.4–5.3)
PROT SERPL-MCNC: 6.6 G/DL (ref 6.8–8.8)
RBC # BLD AUTO: 3.32 10E6/UL (ref 3.8–5.2)
RBC MORPH BLD: ABNORMAL
SODIUM SERPL-SCNC: 140 MMOL/L (ref 133–144)
TOTAL PROTEIN SERUM FOR ELP: 6.2 G/DL (ref 6.4–8.3)
WBC # BLD AUTO: 5.5 10E3/UL (ref 4–11)

## 2022-12-26 PROCEDURE — 85025 COMPLETE CBC W/AUTO DIFF WBC: CPT | Performed by: INTERNAL MEDICINE

## 2022-12-26 PROCEDURE — 250N000011 HC RX IP 250 OP 636: Performed by: INTERNAL MEDICINE

## 2022-12-26 PROCEDURE — G0463 HOSPITAL OUTPT CLINIC VISIT: HCPCS | Mod: 25 | Performed by: INTERNAL MEDICINE

## 2022-12-26 PROCEDURE — 84165 PROTEIN E-PHORESIS SERUM: CPT | Mod: TC | Performed by: PATHOLOGY

## 2022-12-26 PROCEDURE — 80053 COMPREHEN METABOLIC PANEL: CPT | Performed by: INTERNAL MEDICINE

## 2022-12-26 PROCEDURE — 84155 ASSAY OF PROTEIN SERUM: CPT | Performed by: INTERNAL MEDICINE

## 2022-12-26 PROCEDURE — 82784 ASSAY IGA/IGD/IGG/IGM EACH: CPT | Performed by: INTERNAL MEDICINE

## 2022-12-26 PROCEDURE — 99214 OFFICE O/P EST MOD 30 MIN: CPT | Performed by: INTERNAL MEDICINE

## 2022-12-26 PROCEDURE — 96401 CHEMO ANTI-NEOPL SQ/IM: CPT

## 2022-12-26 PROCEDURE — 83521 IG LIGHT CHAINS FREE EACH: CPT | Mod: 59 | Performed by: INTERNAL MEDICINE

## 2022-12-26 PROCEDURE — 36415 COLL VENOUS BLD VENIPUNCTURE: CPT | Performed by: INTERNAL MEDICINE

## 2022-12-26 RX ORDER — EPINEPHRINE 1 MG/ML
0.3 INJECTION, SOLUTION INTRAMUSCULAR; SUBCUTANEOUS EVERY 5 MIN PRN
Status: CANCELLED | OUTPATIENT
Start: 2022-12-26

## 2022-12-26 RX ORDER — DIPHENHYDRAMINE HCL 25 MG
50 CAPSULE ORAL
Status: CANCELLED | OUTPATIENT
Start: 2022-12-26

## 2022-12-26 RX ORDER — DIPHENHYDRAMINE HYDROCHLORIDE AND LIDOCAINE HYDROCHLORIDE AND ALUMINUM HYDROXIDE AND MAGNESIUM HYDRO
5-10 KIT EVERY 6 HOURS PRN
Qty: 237 ML | Refills: 1 | Status: SHIPPED | OUTPATIENT
Start: 2022-12-26 | End: 2023-04-24

## 2022-12-26 RX ORDER — HEPARIN SODIUM,PORCINE 10 UNIT/ML
5 VIAL (ML) INTRAVENOUS
Status: CANCELLED | OUTPATIENT
Start: 2022-12-26

## 2022-12-26 RX ORDER — DEXAMETHASONE 4 MG/1
12 TABLET ORAL
Status: CANCELLED | OUTPATIENT
Start: 2022-12-26

## 2022-12-26 RX ORDER — DIPHENHYDRAMINE HYDROCHLORIDE 50 MG/ML
50 INJECTION INTRAMUSCULAR; INTRAVENOUS
Status: CANCELLED
Start: 2022-12-26

## 2022-12-26 RX ORDER — HEPARIN SODIUM (PORCINE) LOCK FLUSH IV SOLN 100 UNIT/ML 100 UNIT/ML
5 SOLUTION INTRAVENOUS
Status: CANCELLED | OUTPATIENT
Start: 2022-12-26

## 2022-12-26 RX ORDER — ALBUTEROL SULFATE 0.83 MG/ML
2.5 SOLUTION RESPIRATORY (INHALATION)
Status: CANCELLED | OUTPATIENT
Start: 2022-12-26

## 2022-12-26 RX ORDER — LORAZEPAM 2 MG/ML
0.5 INJECTION INTRAMUSCULAR EVERY 4 HOURS PRN
Status: CANCELLED | OUTPATIENT
Start: 2022-12-26

## 2022-12-26 RX ORDER — LENALIDOMIDE 10 MG/1
10 CAPSULE ORAL DAILY
Qty: 14 CAPSULE | Refills: 0 | Status: SHIPPED | OUTPATIENT
Start: 2022-12-26 | End: 2023-01-16

## 2022-12-26 RX ORDER — MEPERIDINE HYDROCHLORIDE 25 MG/ML
25 INJECTION INTRAMUSCULAR; INTRAVENOUS; SUBCUTANEOUS EVERY 30 MIN PRN
Status: CANCELLED | OUTPATIENT
Start: 2022-12-26

## 2022-12-26 RX ORDER — METHYLPREDNISOLONE SODIUM SUCCINATE 125 MG/2ML
125 INJECTION, POWDER, LYOPHILIZED, FOR SOLUTION INTRAMUSCULAR; INTRAVENOUS
Status: CANCELLED
Start: 2022-12-26

## 2022-12-26 RX ORDER — ACETAMINOPHEN 325 MG/1
650 TABLET ORAL
Status: CANCELLED | OUTPATIENT
Start: 2022-12-26

## 2022-12-26 RX ORDER — ALBUTEROL SULFATE 90 UG/1
1-2 AEROSOL, METERED RESPIRATORY (INHALATION)
Status: CANCELLED
Start: 2022-12-26

## 2022-12-26 RX ADMIN — DARATUMUMAB AND HYALURONIDASE-FIHJ (HUMAN RECOMBINANT) 1800 MG: 1800; 30000 INJECTION SUBCUTANEOUS at 11:18

## 2022-12-26 ASSESSMENT — PAIN SCALES - GENERAL: PAINLEVEL: NO PAIN (0)

## 2022-12-26 NOTE — PROGRESS NOTES
HEMATOLOGY HISTORY: Mrs. Dunaway is a lady with multiple myeloma (IgG kappa). High risk, t(14;16).  1. On 05/01/2017, WBC of 3.4, hemoglobin of 10.2 and platelet of 154.  2. SPEP on 07/07/2017 revealed M-spike of 1.8.  3. Bone marrow biopsy on 07/12/2017 reveals kappa monotypic plasma cell population consistent with multiple myeloma.  Bone marrow is 70% cellular.  Plasma cell is 50-60%.     -There is gain of chromosome 1, 5, 9, 15, and 17.  There is translocation 14;16.   4.  On 07/18/2017:  -M-spike of 1.9.   -Immunofixation reveals monoclonal IgG kappa.    -IgG level of 2970.  IgA of 15 and IgM of 175.    -Kappa free light chain of 67.7.  Lambda free light chain of 1.42.  Ratio of kappa to lambda of 47.71.    -Beta 2 microglobulin of 3.4.   5. PET scan on 07/24/2017 reveals several focal areas of increased FDG uptake consistent with multiple myeloma.  There is probable epithelial cyst in tail of the pancreas.  No associated abnormal FDG activity.  Left thyroid cysts or nodules without any FDG activity.  There is a 0.3 cm left upper lobe lung nodule.   6.  Velcade, Revlimid and dexamethasone between on 08/14/2017 and 04/30/2018. Velcade stopped.   -Revlimid and dexamethasone continued.  -Revlimid held between 12/27/2021 and 03/08/2022.  -Daratumumab added on 11/14/2022.  7. CT chest, abdomen, and pelvis on 12/30/2021 does not reveal any malignancy.     SUBJECTIVE:  Ms. Dunaway is an 87-year-old female with multiple myeloma on daratumumab, revlimid and dexamethasone. Zometa is on hold for dental work.    Patient did not get treatment last week as she was not feeling good.  She is feeling better.  Dizziness is better    She has some pain in the mouth.  She can swallow well.    She has generalized weakness.  No worsening.  No headache.  Dizziness is better.  No chest pain.  No shortness of breath.  No abdominal pain.  No nausea or vomiting.  Appetite is decreased.  All other review systems negative.     PHYSICAL  EXAMINATION:   GENERAL:  Alert and oriented x 3.  Not in any distress.  VITAL SIGNS:  Reviewed.       THROAT: No ulcer.  No thrush.  Rest of the system is not examined.     LABORATORY: CBC and CMP reviewed.      ASSESSMENT:    1.  An 87-year-old female with multiple myeloma on Revlimid, daratumumab and dexamethasone.  2.  Normocytic anemia, stable.  3.  Fatigue secondary to her old age, myeloma and multiple other medical problem.  4.  Pain in the mouth.    PLAN:    1.  Patient is overall better.  She will get her treatment with Daratumumab, Revlimid and dexamethasone.    2.  Labs were reviewed.  Overall labs are good for treatment.    3.  She has pain in the mouth.  On exam, I do not see any ulcers or thrush.  We will start her on magic mouthwash.    4.  She had few questions which were all answered.  I will see her in 4 to 6 weeks.  In between she will see our nurse practitioner.     TOTAL VISIT TIME: 20 minutes.  Time spent in today's visit, review of chart/investigations today, monitoring for toxicity of high risk medications and documentation today.

## 2022-12-26 NOTE — LETTER
"    12/26/2022         RE: Quiana Dunaway  [address] [facility] [address]        Dear Colleague,    Thank you for referring your patient, Quiana Dunaway, to the [facility]. Please see a copy of my visit note below.    Oncology Rooming Note    December 26, 2022 10:15 AM   Quiana Dunaway is a 87 year old female who presents for:    Chief Complaint   Patient presents with     Oncology Clinic Visit     Initial Vitals: There were no vitals taken for this visit. Estimated body mass index is 23.78 kg/m  as calculated from the following:    Height as of 12/21/22: 1.575 m (5' 2\").    Weight as of 12/21/22: 59 kg (130 lb). There is no height or weight on file to calculate BSA.  Data Unavailable Comment: Data Unavailable   No LMP recorded. Patient is postmenopausal.  Allergies reviewed: Yes  Medications reviewed: Yes    Medications: Medication refills not needed today.  Pharmacy name entered into [facility]:    [facility] - [place], [place] - [address] [id number] - [place], [place] - [address] AT [facility]    Clinical concerns:  doctor was notified.      Jacquie Mcdaniel MA              HEMATOLOGY HISTORY: Mrs. Dunaway is a lady with multiple myeloma (IgG kappa). High risk, t(14;16).  1. On 05/01/2017, WBC of 3.4, hemoglobin of 10.2 and platelet of 154.  2. SPEP on 07/07/2017 revealed M-spike of 1.8.  3. Bone marrow biopsy on 07/12/2017 reveals kappa monotypic plasma cell population consistent with multiple myeloma.  Bone marrow is 70% cellular.  Plasma cell is 50-60%.     -There is gain of chromosome 1, 5, 9, 15, and 17.  There is translocation 14;16.   4.  On 07/18/2017:  -M-spike of 1.9.   -Immunofixation reveals monoclonal IgG kappa.    -IgG level of 2970.  IgA of 15 and IgM of 175.    -Kappa free light chain of 67.7.  Lambda free light chain of 1.42.  Ratio of kappa to lambda of 47.71.    -Beta 2 " microglobulin of 3.4.   5. PET scan on 07/24/2017 reveals several focal areas of increased FDG uptake consistent with multiple myeloma.  There is probable epithelial cyst in tail of the pancreas.  No associated abnormal FDG activity.  Left thyroid cysts or nodules without any FDG activity.  There is a 0.3 cm left upper lobe lung nodule.   6.  Velcade, Revlimid and dexamethasone between on 08/14/2017 and 04/30/2018. Velcade stopped.   -Revlimid and dexamethasone continued.  -Revlimid held between 12/27/2021 and 03/08/2022.  -Daratumumab added on 11/14/2022.  7. CT chest, abdomen, and pelvis on 12/30/2021 does not reveal any malignancy.     SUBJECTIVE:  Ms. Dunaway is an 87-year-old female with multiple myeloma on daratumumab, revlimid and dexamethasone. Zometa is on hold for dental work.    Patient did not get treatment last week as she was not feeling good.  She is feeling better.  Dizziness is better    She has some pain in the mouth.  She can swallow well.    She has generalized weakness.  No worsening.  No headache.  Dizziness is better.  No chest pain.  No shortness of breath.  No abdominal pain.  No nausea or vomiting.  Appetite is decreased.  All other review systems negative.     PHYSICAL EXAMINATION:   GENERAL:  Alert and oriented x 3.  Not in any distress.  VITAL SIGNS:  Reviewed.       THROAT: No ulcer.  No thrush.  Rest of the system is not examined.     LABORATORY: CBC and CMP reviewed.      ASSESSMENT:    1.  An 87-year-old female with multiple myeloma on Revlimid, daratumumab and dexamethasone.  2.  Normocytic anemia, stable.  3.  Fatigue secondary to her old age, myeloma and multiple other medical problem.  4.  Pain in the mouth.    PLAN:    1.  Patient is overall better.  She will get her treatment with Daratumumab, Revlimid and dexamethasone.    2.  Labs were reviewed.  Overall labs are good for treatment.    3.  She has pain in the mouth.  On exam, I do not see any ulcers or thrush.  We will start  her on magic mouthwash.    4.  She had few questions which were all answered.  I will see her in 4 to 6 weeks.  In between she will see our nurse practitioner.     TOTAL VISIT TIME: 20 minutes.  Time spent in today's visit, review of chart/investigations today, monitoring for toxicity of high risk medications and documentation today.      Again, thank you for allowing me to participate in the care of your patient.        Sincerely,        Alex Parry MD

## 2022-12-26 NOTE — PROGRESS NOTES
Medical Assistant Note:  Quiana Dunaway presents today for blood draw.    Patient seen by provider today: Yes: nakita.   present during visit today: Not Applicable.    Concerns: No Concerns.    Procedure:  Lab draw site: left hand, Needle type: bf, Gauge: 23.    Post Assessment:  Labs drawn without difficulty: Yes.    Discharge Plan:  Departure Mode: Ambulatory.    Face to Face Time: 5 min.    Mariela Lyn, CMA

## 2022-12-26 NOTE — PROGRESS NOTES
Infusion Nursing Note:  Quiana PEREZ Emelyn presents today for C2D1 Darzalex Faspro.    Patient seen by provider today: Yes: Dr. Parry   present during visit today: Not Applicable.    Note: N/A.    Intravenous Access:  No Intravenous access/labs at this visit.    Treatment Conditions:  Lab Results   Component Value Date    HGB 10.2 (L) 12/26/2022    WBC 5.5 12/26/2022    ANEU 0.3 (LL) 12/10/2022    ANEUTAUTO 2.3 12/26/2022     12/26/2022      Lab Results   Component Value Date     12/26/2022    POTASSIUM 3.8 12/26/2022    MAG 2.4 (H) 05/26/2020    CR 0.69 12/26/2022    HUMBERTO 8.9 12/26/2022    BILITOTAL 0.8 12/26/2022    ALBUMIN 3.2 (L) 12/26/2022    ALT 16 12/26/2022    AST 12 12/26/2022     Results reviewed, labs MET treatment parameters, ok to proceed with treatment.    Post Infusion Assessment:  Patient tolerated injection without incident.  Site patent and intact, free from redness, edema or discomfort.  No evidence of extravasations.     Discharge Plan:   Patient declined prescription refills.  Discharge instructions reviewed with: Patient.  Patient and/or family verbalized understanding of discharge instructions and all questions answered.  AVS to patient via [employer].  Patient will return 1/2 for next appointment.   Patient discharged in stable condition accompanied by: self.  Departure Mode: Ambulatory.      Hiram Chu RN

## 2022-12-26 NOTE — PROGRESS NOTES
"Oncology Rooming Note    December 26, 2022 10:15 AM   Quiana Dunaway is a 87 year old female who presents for:    Chief Complaint   Patient presents with     Oncology Clinic Visit     Initial Vitals: There were no vitals taken for this visit. Estimated body mass index is 23.78 kg/m  as calculated from the following:    Height as of 12/21/22: 1.575 m (5' 2\").    Weight as of 12/21/22: 59 kg (130 lb). There is no height or weight on file to calculate BSA.  Data Unavailable Comment: Data Unavailable   No LMP recorded. Patient is postmenopausal.  Allergies reviewed: Yes  Medications reviewed: Yes    Medications: Medication refills not needed today.  Pharmacy name entered into "[employer]":    [facility] - [place], [place] - [address]  [id number] - [place], [place] - [address] AT [facility]    Clinical concerns:  doctor was notified.      Jacquie Mcdaniel MA            "

## 2022-12-26 NOTE — ORAL ONC MGMT
Oral Chemotherapy Monitoring Program  Lab Follow Up     Patient currently on Lenalidomide therapy.  Reviewed lab results from today.     Lab Results   Component Value Date    WBC 5.5 12/26/2022    WBC 3.9 06/02/2021     Lab Results   Component Value Date    RBC 3.32 12/26/2022    RBC 3.73 06/02/2021     Lab Results   Component Value Date    HGB 10.2 12/26/2022    HGB 11.8 06/02/2021     Lab Results   Component Value Date    HCT 30.0 12/26/2022    HCT 35.4 06/02/2021     Lab Results   Component Value Date    MCV 90 12/26/2022    MCV 95 06/02/2021     Lab Results   Component Value Date    MCH 30.7 12/26/2022    MCH 31.6 06/02/2021     Lab Results   Component Value Date    MCHC 34.0 12/26/2022    MCHC 33.3 06/02/2021     Lab Results   Component Value Date    RDW 15.2 12/26/2022    RDW 13.2 06/02/2021     Lab Results   Component Value Date     12/26/2022     06/02/2021       Last Comprehensive Metabolic Panel:  Sodium   Date Value Ref Range Status   12/26/2022 140 133 - 144 mmol/L Final   06/02/2021 141 133 - 144 mmol/L Final     Potassium   Date Value Ref Range Status   12/26/2022 3.8 3.4 - 5.3 mmol/L Final   06/02/2021 3.7 3.4 - 5.3 mmol/L Final     Chloride   Date Value Ref Range Status   12/26/2022 109 94 - 109 mmol/L Final   06/02/2021 108 94 - 109 mmol/L Final     Carbon Dioxide   Date Value Ref Range Status   06/02/2021 30 20 - 32 mmol/L Final     Carbon Dioxide (CO2)   Date Value Ref Range Status   12/26/2022 25 20 - 32 mmol/L Final     Anion Gap   Date Value Ref Range Status   12/26/2022 6 3 - 14 mmol/L Final   06/02/2021 3 3 - 14 mmol/L Final     Glucose   Date Value Ref Range Status   12/26/2022 100 (H) 70 - 99 mg/dL Final   06/02/2021 83 70 - 99 mg/dL Final     Urea Nitrogen   Date Value Ref Range Status   12/26/2022 21 7 - 30 mg/dL Final   06/02/2021 23 7 - 30 mg/dL Final     Creatinine   Date Value Ref Range Status   12/26/2022 0.69 0.52 - 1.04 mg/dL Final   06/02/2021 0.67 0.52 - 1.04 mg/dL  Final     GFR Estimate   Date Value Ref Range Status   12/26/2022 84 >60 mL/min/1.73m2 Final     Comment:     Effective December 21, 2021 eGFRcr in adults is calculated using the 2021 CKD-EPI creatinine equation which includes age and gender (Karina milton al., NEJ, DOI: 10.1056/UQSEyw3712432)   06/02/2021 80 >60 mL/min/[1.73_m2] Final     Comment:     Non  GFR Calc  Starting 12/18/2018, serum creatinine based estimated GFR (eGFR) will be   calculated using the Chronic Kidney Disease Epidemiology Collaboration   (CKD-EPI) equation.       Calcium   Date Value Ref Range Status   12/26/2022 8.9 8.5 - 10.1 mg/dL Final   06/02/2021 8.8 8.5 - 10.1 mg/dL Final     Bilirubin Total   Date Value Ref Range Status   12/26/2022 0.8 0.2 - 1.3 mg/dL Final   06/02/2021 1.2 0.2 - 1.3 mg/dL Final     Alkaline Phosphatase   Date Value Ref Range Status   12/26/2022 122 40 - 150 U/L Final   06/02/2021 65 40 - 150 U/L Final     ALT   Date Value Ref Range Status   12/26/2022 16 0 - 50 U/L Final   06/02/2021 22 0 - 50 U/L Final     AST   Date Value Ref Range Status   12/26/2022 12 0 - 45 U/L Final   06/02/2021 10 0 - 45 U/L Final     Assessment & Plan:  CMP and CBC reviewed, no new concerns. Ok to continue Lenalidomide 10 mg daily x 14 days on, then take 7 days off. Continue monthly lab monitoring in regard to Lenalidomide per Dr. Parry. Of note, patient may have lab checks to coordinate with infusions so will need to mindful when reviewing labs in regard to if patient is on Lenalidomide or if during her 7 days off.      Follow-Up:  1/2/23 lab appt and infusion appt    Neno Canales PharmD  [facility] Oral [facility]otherapy  [phone]

## 2022-12-27 ENCOUNTER — TELEPHONE (OUTPATIENT)
Dept: ONCOLOGY | Facility: CLINIC | Age: 87
End: 2022-12-27

## 2022-12-27 LAB
ALBUMIN SERPL ELPH-MCNC: 3.7 G/DL (ref 3.7–5.1)
ALPHA1 GLOB SERPL ELPH-MCNC: 0.4 G/DL (ref 0.2–0.4)
ALPHA2 GLOB SERPL ELPH-MCNC: 0.7 G/DL (ref 0.5–0.9)
B-GLOBULIN SERPL ELPH-MCNC: 0.6 G/DL (ref 0.6–1)
GAMMA GLOB SERPL ELPH-MCNC: 0.9 G/DL (ref 0.7–1.6)
IGG SERPL-MCNC: 1066 MG/DL (ref 610–1616)
KAPPA LC FREE SER-MCNC: 73.23 MG/DL (ref 0.33–1.94)
KAPPA LC FREE/LAMBDA FREE SER NEPH: 221.91 {RATIO} (ref 0.26–1.65)
LAMBDA LC FREE SERPL-MCNC: 0.33 MG/DL (ref 0.57–2.63)
M PROTEIN SERPL ELPH-MCNC: 0.8 G/DL
PROT PATTERN SERPL ELPH-IMP: ABNORMAL

## 2022-12-27 PROCEDURE — 84165 PROTEIN E-PHORESIS SERUM: CPT | Mod: 26

## 2022-12-27 NOTE — TELEPHONE ENCOUNTER
Oral Chemotherapy Monitoring Program    Medication:Revlimid  Rx:  10mg PO daily on days 1 - 14 of 21 day cycle #14    Auth #:[id number]  Risk Category: Adult female NOT of reproductive capacity  Routine survey questions reviewed.    Thank you,    Sandra Saenz  Oncology/Transplant Float [email or web address]  Phone:[phone]  Fax:[phone]

## 2022-12-30 ENCOUNTER — DOCUMENTATION ONLY (OUTPATIENT)
Dept: FAMILY MEDICINE | Facility: CLINIC | Age: 87
End: 2022-12-30

## 2022-12-30 DIAGNOSIS — R26.89 IMPAIRMENT OF BALANCE: Primary | ICD-10-CM

## 2022-12-30 NOTE — PROGRESS NOTES
Spoke w daughter - states they now have the new wheel walker. No other concerns at the time.  Sharonda Hunter, CMA

## 2023-01-02 ENCOUNTER — INFUSION THERAPY VISIT (OUTPATIENT)
Dept: INFUSION THERAPY | Facility: CLINIC | Age: 88
End: 2023-01-02
Attending: INTERNAL MEDICINE
Payer: MEDICARE

## 2023-01-02 ENCOUNTER — ONCOLOGY VISIT (OUTPATIENT)
Dept: ONCOLOGY | Facility: CLINIC | Age: 88
End: 2023-01-02
Attending: INTERNAL MEDICINE
Payer: MEDICARE

## 2023-01-02 ENCOUNTER — DOCUMENTATION ONLY (OUTPATIENT)
Dept: PHARMACY | Facility: CLINIC | Age: 88
End: 2023-01-02

## 2023-01-02 VITALS
OXYGEN SATURATION: 96 % | WEIGHT: 127 LBS | TEMPERATURE: 98.3 F | BODY MASS INDEX: 23.23 KG/M2 | RESPIRATION RATE: 16 BRPM | SYSTOLIC BLOOD PRESSURE: 104 MMHG | DIASTOLIC BLOOD PRESSURE: 58 MMHG | HEART RATE: 85 BPM

## 2023-01-02 DIAGNOSIS — C90.00 MULTIPLE MYELOMA NOT HAVING ACHIEVED REMISSION (H): ICD-10-CM

## 2023-01-02 DIAGNOSIS — C90.00 MULTIPLE MYELOMA NOT HAVING ACHIEVED REMISSION (H): Primary | ICD-10-CM

## 2023-01-02 DIAGNOSIS — K13.79 MOUTH SORES: ICD-10-CM

## 2023-01-02 LAB
BASOPHILS # BLD AUTO: 0 10E3/UL (ref 0–0.2)
BASOPHILS NFR BLD AUTO: 0 %
ELLIPTOCYTES BLD QL SMEAR: ABNORMAL
EOSINOPHIL # BLD AUTO: 0 10E3/UL (ref 0–0.7)
EOSINOPHIL NFR BLD AUTO: 1 %
ERYTHROCYTE [DISTWIDTH] IN BLOOD BY AUTOMATED COUNT: 15.6 % (ref 10–15)
HCT VFR BLD AUTO: 32.8 % (ref 35–47)
HGB BLD-MCNC: 11 G/DL (ref 11.7–15.7)
IMM GRANULOCYTES # BLD: 0 10E3/UL
IMM GRANULOCYTES NFR BLD: 0 %
LYMPHOCYTES # BLD AUTO: 2 10E3/UL (ref 0.8–5.3)
LYMPHOCYTES NFR BLD AUTO: 41 %
MCH RBC QN AUTO: 31 PG (ref 26.5–33)
MCHC RBC AUTO-ENTMCNC: 33.5 G/DL (ref 31.5–36.5)
MCV RBC AUTO: 92 FL (ref 78–100)
MONOCYTES # BLD AUTO: 1 10E3/UL (ref 0–1.3)
MONOCYTES NFR BLD AUTO: 21 %
NEUTROPHILS # BLD AUTO: 1.9 10E3/UL (ref 1.6–8.3)
NEUTROPHILS NFR BLD AUTO: 37 %
NRBC # BLD AUTO: 0 10E3/UL
NRBC BLD AUTO-RTO: 0 /100
PLAT MORPH BLD: ABNORMAL
PLATELET # BLD AUTO: 135 10E3/UL (ref 150–450)
RBC # BLD AUTO: 3.55 10E6/UL (ref 3.8–5.2)
RBC MORPH BLD: ABNORMAL
WBC # BLD AUTO: 5 10E3/UL (ref 4–11)

## 2023-01-02 PROCEDURE — 36415 COLL VENOUS BLD VENIPUNCTURE: CPT | Performed by: INTERNAL MEDICINE

## 2023-01-02 PROCEDURE — G0463 HOSPITAL OUTPT CLINIC VISIT: HCPCS | Mod: 25 | Performed by: NURSE PRACTITIONER

## 2023-01-02 PROCEDURE — 99214 OFFICE O/P EST MOD 30 MIN: CPT | Performed by: NURSE PRACTITIONER

## 2023-01-02 PROCEDURE — 85025 COMPLETE CBC W/AUTO DIFF WBC: CPT | Performed by: INTERNAL MEDICINE

## 2023-01-02 PROCEDURE — 96401 CHEMO ANTI-NEOPL SQ/IM: CPT

## 2023-01-02 PROCEDURE — 250N000011 HC RX IP 250 OP 636: Performed by: INTERNAL MEDICINE

## 2023-01-02 RX ADMIN — DARATUMUMAB AND HYALURONIDASE-FIHJ (HUMAN RECOMBINANT) 1800 MG: 1800; 30000 INJECTION SUBCUTANEOUS at 10:26

## 2023-01-02 ASSESSMENT — PAIN SCALES - GENERAL: PAINLEVEL: NO PAIN (0)

## 2023-01-02 NOTE — PROGRESS NOTES
Oral Chemotherapy Monitoring Program.    Patient currently on lenalidomide therapy.    Reviewed labs from 1/2/23. No concerning abnormalities.    PLan: Proceed with next cycle of lenalidomide. Patient due to start 1/2/23. Next cycle due 1/23/23    Questions answered to patient's satisfaction.    Will follow up in 3-4 weeks with repeat labs.     Debra BonillaD  January 2, 2023

## 2023-01-02 NOTE — PROGRESS NOTES
"Oncology Rooming Note    January 2, 2023 9:17 AM   Quiana Dunaway is a 87 year old female who presents for:    Chief Complaint   Patient presents with     Oncology Clinic Visit     Initial Vitals: /58   Pulse 85   Temp 98.3  F (36.8  C) (Oral)   Resp 16   Wt 57.6 kg (127 lb)   SpO2 96%   BMI 23.23 kg/m   Estimated body mass index is 23.23 kg/m  as calculated from the following:    Height as of 12/26/22: 1.575 m (5' 2\").    Weight as of this encounter: 57.6 kg (127 lb). Body surface area is 1.59 meters squared.  No Pain (0) Comment: Data Unavailable   No LMP recorded. Patient is postmenopausal.  Allergies reviewed: Yes  Medications reviewed: Yes    Medications: Medication refills not needed today.  Pharmacy name entered into [employer]:    [facility] - [place], [place] - [address] [id number] - [place], [place] - [address] AT [facility]    Clinical concerns: no       Florencia Alicia            "

## 2023-01-02 NOTE — PROGRESS NOTES
Infusion Nursing Note:  Quiana Dunaway presents today for C2D8 Darzalex faspro.    Patient seen by provider today: Yes: Juaquin Hammond NP   present during visit today: Not Applicable.    Note: N/A.    Intravenous Access:  No Intravenous access/labs at this visit.    Treatment Conditions:  Lab Results   Component Value Date    HGB 11.0 (L) 01/02/2023    WBC 5.0 01/02/2023    ANEU 0.3 (LL) 12/10/2022    ANEUTAUTO 1.9 01/02/2023     (L) 01/02/2023      Results reviewed, labs MET treatment parameters, ok to proceed with treatment.    Post Infusion Assessment:  Patient tolerated injection without incident.  Site patent and intact, free from redness, edema or discomfort.     Discharge Plan:   Discharge instructions reviewed with: Patient and Family.  Patient and/or family verbalized understanding of discharge instructions and all questions answered.  AVS to patient via [employer].  Patient will return 1/9/23 for next appointment.   Patient discharged in stable condition accompanied by: self and daughter.  Departure Mode: Ambulatory with walker.      Twyla Duncan RN

## 2023-01-02 NOTE — PROGRESS NOTES
Medical Assistant Note:  Quiana Dunaway presents today for blood draw.    Patient seen by provider today: Yes: marco a.   present during visit today: Not Applicable.    Concerns: No Concerns.    Procedure:  Lab draw site: right hand, Needle type: bf, Gauge: 23.    Post Assessment:  Labs drawn without difficulty: Yes.    Discharge Plan:  Departure Mode: Ambulatory.    Face to Face Time: 5 min.    Mariela Lyn, CMA

## 2023-01-02 NOTE — LETTER
"    1/2/2023         RE: Quiana Dunaway  [address] [place] [address]        Dear Colleague,    Thank you for referring your patient, Quiana Dunaway, to the [facility] [place]. Please see a copy of my visit note below.    Oncology Rooming Note    January 2, 2023 9:17 AM   Quiana Dunaway is a 87 year old female who presents for:    Chief Complaint   Patient presents with     Oncology Clinic Visit     Initial Vitals: /58   Pulse 85   Temp 98.3  F (36.8  C) (Oral)   Resp 16   Wt 57.6 kg (127 lb)   SpO2 96%   BMI 23.23 kg/m   Estimated body mass index is 23.23 kg/m  as calculated from the following:    Height as of 12/26/22: 1.575 m (5' 2\").    Weight as of this encounter: 57.6 kg (127 lb). Body surface area is 1.59 meters squared.  No Pain (0) Comment: Data Unavailable   No LMP recorded. Patient is postmenopausal.  Allergies reviewed: Yes  Medications reviewed: Yes    Medications: Medication refills not needed today.  Pharmacy name entered into [facility]:    [facility] - [place], [place] - [address]  [id number] - [place], [place] - [address] AT [facility]    Clinical concerns: no       Florencia Alicia              Oncology/Hematology Visit Note  Jan 2, 2023    Reason for Visit: follow up of multiple myeloma  Patient Dr. Parry  Currently getting treatment with treatment with daratumumab Revlimid and dexamethasone      Interval History:  Patient reports she was swallowing.  She was prescribed oncology Magic mouthwash however she has not started it yet.  Patient denies any swelling of the throat or tongue denies sore throat denies mouth sores.  Denies swelling of the neck.  Denies fever chills sweats cough chest pain.  Occasionally she has nausea she takes Zofran which helps with nausea denies vomiting or abdominal pain.  She has chronic diarrhea she takes Imodium for denies blood in " stool      Review of Systems:  14 point ROS of systems including Constitutional, Eyes, Respiratory, Cardiovascular, Gastroenterology, Genitourinary, Integumentary, Muscularskeletal, Psychiatric were all negative except for pertinent positives noted in my HPI.    Physical Examination:  General: The patient is a pleasant female in no acute distress.  /58   Pulse 85   Temp 98.3  F (36.8  C) (Oral)   Resp 16   Wt 57.6 kg (127 lb)   SpO2 96%   BMI 23.23 kg/m    HEENT: EOMI, PERRL. Sclerae are anicteric. Oral mucosa is pink and moist with no lesions or thrush.   Lymph: Neck is supple with no lymphadenopathy in the cervical or supraclavicular areas.   Heart: Regular rate and rhythm.   Lungs: Clear to auscultation bilaterally.   GI: Bowel sounds present, soft, nontender with no palpable hepatosplenomegaly or masses.   Extremities: No lower extremity edema noted bilaterally.   Skin: No rashes, petechiae, or bruising noted on exposed skin.    LABS  -CBC CMP results reviewed    Assessment and Plan:  This is an 87-year-old female with    Multiple myeloma  Currently on Revlimid daratumumab and dexamethasone  Potential side effects reviewed  Labs reviewed  Continue with treatment  Patient has follow-up appointment with Dr. Parry in 3 weeks      Prophylaxis  Continue with aspirin and acyclovir    Bone health  Continue to hold Zometa until dental work-up is done      Anemia  Patient denies bleeding  Overall stable    Thrombocytopenia  Patient denies bleeding  Complications of thrombocytopenia reviewed  Call our clinic or go to ER in the event of bleeding fall injury or bruising    Fatigue  Chronic issue  Multifactorial  Continue to monitor  Call our clinic with any changes or worsening of fatigue      Dysphagia  Started oncology Magic mouthwash  Advised endoscopy or ENT referral  Patient reports her friend's ENT physician she will make appointment with him  -With any changes or worsening of dysphagia advised patient to  go to ER    Nausea  Intermittent.  Reports Zofran helps  Continue with Zofran as needed      Chronic diarrhea  -Imodium helps  Patient denies changes or worsening of symptoms  Continue with Imodium as needed  Call our clinic with any changes or worsening of diarrhea        Call our clinic with any changes in health condition questions    MARIUSZ Mckeon CNP  [facility]     Chart documentation with Dragon Voice recognition Software. Although reviewed after completion, some words and grammatical errors may remain.            Again, thank you for allowing me to participate in the care of your patient.        Sincerely,        MARIUSZ Mckeon CNP

## 2023-01-06 ENCOUNTER — TRANSFERRED RECORDS (OUTPATIENT)
Dept: HEALTH INFORMATION MANAGEMENT | Facility: CLINIC | Age: 88
End: 2023-01-06

## 2023-01-09 ENCOUNTER — INFUSION THERAPY VISIT (OUTPATIENT)
Dept: INFUSION THERAPY | Facility: CLINIC | Age: 88
End: 2023-01-09
Attending: INTERNAL MEDICINE
Payer: MEDICARE

## 2023-01-09 VITALS
HEIGHT: 62 IN | SYSTOLIC BLOOD PRESSURE: 115 MMHG | DIASTOLIC BLOOD PRESSURE: 64 MMHG | RESPIRATION RATE: 16 BRPM | WEIGHT: 125.6 LBS | HEART RATE: 76 BPM | TEMPERATURE: 97.9 F | BODY MASS INDEX: 23.11 KG/M2 | OXYGEN SATURATION: 97 %

## 2023-01-09 DIAGNOSIS — C90.00 MULTIPLE MYELOMA NOT HAVING ACHIEVED REMISSION (H): Primary | ICD-10-CM

## 2023-01-09 LAB
ALBUMIN SERPL-MCNC: 3.4 G/DL (ref 3.4–5)
ALP SERPL-CCNC: 80 U/L (ref 40–150)
ALT SERPL W P-5'-P-CCNC: 10 U/L (ref 0–50)
ANION GAP SERPL CALCULATED.3IONS-SCNC: 10 MMOL/L (ref 3–14)
AST SERPL W P-5'-P-CCNC: 5 U/L (ref 0–45)
BASOPHILS # BLD AUTO: 0 10E3/UL (ref 0–0.2)
BASOPHILS NFR BLD AUTO: 0 %
BILIRUB SERPL-MCNC: 0.8 MG/DL (ref 0.2–1.3)
BUN SERPL-MCNC: 23 MG/DL (ref 7–30)
CALCIUM SERPL-MCNC: 8.9 MG/DL (ref 8.5–10.1)
CHLORIDE BLD-SCNC: 107 MMOL/L (ref 94–109)
CO2 SERPL-SCNC: 23 MMOL/L (ref 20–32)
CREAT SERPL-MCNC: 0.81 MG/DL (ref 0.52–1.04)
EOSINOPHIL # BLD AUTO: 0.1 10E3/UL (ref 0–0.7)
EOSINOPHIL NFR BLD AUTO: 3 %
ERYTHROCYTE [DISTWIDTH] IN BLOOD BY AUTOMATED COUNT: 15.8 % (ref 10–15)
GFR SERPL CREATININE-BSD FRML MDRD: 70 ML/MIN/1.73M2
GLUCOSE BLD-MCNC: 87 MG/DL (ref 70–99)
HCT VFR BLD AUTO: 32 % (ref 35–47)
HGB BLD-MCNC: 10.5 G/DL (ref 11.7–15.7)
IMM GRANULOCYTES # BLD: 0 10E3/UL
IMM GRANULOCYTES NFR BLD: 1 %
LYMPHOCYTES # BLD AUTO: 1.7 10E3/UL (ref 0.8–5.3)
LYMPHOCYTES NFR BLD AUTO: 35 %
MCH RBC QN AUTO: 30.6 PG (ref 26.5–33)
MCHC RBC AUTO-ENTMCNC: 32.8 G/DL (ref 31.5–36.5)
MCV RBC AUTO: 93 FL (ref 78–100)
MONOCYTES # BLD AUTO: 0.7 10E3/UL (ref 0–1.3)
MONOCYTES NFR BLD AUTO: 14 %
NEUTROPHILS # BLD AUTO: 2.4 10E3/UL (ref 1.6–8.3)
NEUTROPHILS NFR BLD AUTO: 47 %
NRBC # BLD AUTO: 0 10E3/UL
NRBC BLD AUTO-RTO: 0 /100
PLATELET # BLD AUTO: 190 10E3/UL (ref 150–450)
POTASSIUM BLD-SCNC: 3.6 MMOL/L (ref 3.4–5.3)
PROT SERPL-MCNC: 6.8 G/DL (ref 6.8–8.8)
RBC # BLD AUTO: 3.43 10E6/UL (ref 3.8–5.2)
SODIUM SERPL-SCNC: 140 MMOL/L (ref 133–144)
WBC # BLD AUTO: 5 10E3/UL (ref 4–11)

## 2023-01-09 PROCEDURE — 96401 CHEMO ANTI-NEOPL SQ/IM: CPT

## 2023-01-09 PROCEDURE — 250N000011 HC RX IP 250 OP 636: Performed by: INTERNAL MEDICINE

## 2023-01-09 PROCEDURE — 36415 COLL VENOUS BLD VENIPUNCTURE: CPT

## 2023-01-09 PROCEDURE — 85025 COMPLETE CBC W/AUTO DIFF WBC: CPT | Performed by: INTERNAL MEDICINE

## 2023-01-09 PROCEDURE — 80053 COMPREHEN METABOLIC PANEL: CPT | Performed by: INTERNAL MEDICINE

## 2023-01-09 RX ADMIN — DARATUMUMAB AND HYALURONIDASE-FIHJ (HUMAN RECOMBINANT) 1800 MG: 1800; 30000 INJECTION SUBCUTANEOUS at 10:17

## 2023-01-09 NOTE — PROGRESS NOTES
Infusion Nursing Note:  Quiana Dunaway presents today for daratumumab.    Patient seen by provider today: No   present during visit today: Not Applicable.    Note: N/A.    Intravenous Access:  No Intravenous access/labs at this visit.    Treatment Conditions:  Lab Results   Component Value Date    HGB 10.5 (L) 01/09/2023    WBC 5.0 01/09/2023    ANEU 0.3 (LL) 12/10/2022    ANEUTAUTO 2.4 01/09/2023     01/09/2023      Results reviewed, labs MET treatment parameters, ok to proceed with treatment.    Post Infusion Assessment:  Patient tolerated injection without incident.     Discharge Plan:   Patient declined prescription refills.  Discharge instructions reviewed with: Patient and Family.  Patient and/or family verbalized understanding of discharge instructions and all questions answered.  AVS to patient via [employer].  Patient will return 1/16/23 for next appointment.   Patient discharged in stable condition accompanied by: daughter-in-law.  Departure Mode: Ambulatory with walker.      Lalita Palacios RN

## 2023-01-12 ENCOUNTER — TELEPHONE (OUTPATIENT)
Dept: PHARMACY | Facility: CLINIC | Age: 88
End: 2023-01-12

## 2023-01-12 ENCOUNTER — OFFICE VISIT (OUTPATIENT)
Dept: FAMILY MEDICINE | Facility: CLINIC | Age: 88
End: 2023-01-12
Payer: MEDICARE

## 2023-01-12 VITALS
HEART RATE: 74 BPM | SYSTOLIC BLOOD PRESSURE: 115 MMHG | WEIGHT: 123 LBS | TEMPERATURE: 97.9 F | DIASTOLIC BLOOD PRESSURE: 75 MMHG | BODY MASS INDEX: 22.5 KG/M2 | OXYGEN SATURATION: 95 % | RESPIRATION RATE: 18 BRPM

## 2023-01-12 DIAGNOSIS — I10 BENIGN ESSENTIAL HYPERTENSION: ICD-10-CM

## 2023-01-12 DIAGNOSIS — R11.0 NAUSEA: ICD-10-CM

## 2023-01-12 DIAGNOSIS — I50.32 CHRONIC DIASTOLIC CONGESTIVE HEART FAILURE (H): ICD-10-CM

## 2023-01-12 DIAGNOSIS — C90.00 MULTIPLE MYELOMA NOT HAVING ACHIEVED REMISSION (H): Primary | ICD-10-CM

## 2023-01-12 DIAGNOSIS — R42 VERTIGO: ICD-10-CM

## 2023-01-12 PROCEDURE — 99215 OFFICE O/P EST HI 40 MIN: CPT | Performed by: INTERNAL MEDICINE

## 2023-01-12 RX ORDER — FUROSEMIDE 20 MG
TABLET ORAL
Qty: 90 TABLET | Refills: 3 | Status: SHIPPED | OUTPATIENT
Start: 2023-01-12 | End: 2023-11-30

## 2023-01-12 ASSESSMENT — PAIN SCALES - GENERAL: PAINLEVEL: NO PAIN (0)

## 2023-01-12 NOTE — PROGRESS NOTES
Assessment & Plan     Encounter Diagnoses   Name Primary?     Multiple myeloma not having achieved remission (H) Yes     Chronic diastolic congestive heart failure (H)      Benign essential hypertension      Vertigo      Nausea            41 minutes spent on the date of the encounter doing chart review, history and exam, documentation and further activities per the note     MED REC REQUIRED  Post Medication Reconciliation Status:       Patient Instructions   To prevent nausea, take famotidine (Pepcid) every day as directed.  If you feel particularly nauseated and lack of appetite, try taking ondansetron (Zofran) 15-20 minutes before your meal, so you have good appetite and less nausea.      To improve nutrition, supplement protein.  Ensure shakes are an easy way to get supplemental protein. Otherwise, you could look for a protein powder derived from pea protein or whey protein and add the powder to a shake or smoothie to get additional protein.      Chicken soup is a great way to replace fluids if you have diarrhea and become dehydrated.  Use imodium (as directed) if you need it to control diarrhea.      To avoid dizziness spells, let's STOP lisinopril, because dizziness can be caused by blood pressure that is too low.  If your dizziness/vertigo is really severe, then use the medication meclizine (Antivert) to help with vertigo.    Take furosemide (LASIX) if and only if, your measured blood pressure in the AM is greater than 160/100.          Return in about 1 month (around 2/12/2023) for recheck.  Patient instructed to return to clinic or contact us sooner if symptoms worsen or new symptoms develop.     César Chung MD  [facility] MAGALY Araya is a 87 year old accompanied by her family member, presenting for the following health issues:  Follow Up (Patient here for a 6 week follow up per provider.)      History of Present Illness       Reason for visit:  Follow up per   Juliet    She eats 0-1 servings of fruits and vegetables daily.She consumes 2 sweetened beverage(s) daily.She exercises with enough effort to increase her heart rate 9 or less minutes per day.  She exercises with enough effort to increase her heart rate 3 or less days per week.   She is taking medications regularly.         Follow up myeloma, chronic dizziness, nausea, lack of appetite, diastolic heart failure, hypertension   Getting weekly chemo   Feels nausea, lack of appetite, intermittent diarrhea, dizziness after infusions      Review of Systems         Objective    /75 (BP Location: Left arm, Patient Position: Sitting, Cuff Size: Adult Regular)   Pulse 74   Temp 97.9  F (36.6  C) (Temporal)   Resp 18   Wt 55.8 kg (123 lb)   SpO2 95%   BMI 22.50 kg/m    Body mass index is 22.5 kg/m .  Physical Exam   GEN:  Getting more frail, but still has a lot spirit  HEENT:  Mildly dry mucous membranes  CV:  Heart with regular rate and rhythm.   PULM:  The lungs are clear to auscultation bilaterally.  EXT:  No edema   NEURO:  Alert and oriented to person, place and time. In a wheelchair now.

## 2023-01-12 NOTE — PATIENT INSTRUCTIONS
To prevent nausea, take famotidine (Pepcid) every day as directed.  If you feel particularly nauseated and lack of appetite, try taking ondansetron (Zofran) 15-20 minutes before your meal, so you have good appetite and less nausea.      To improve nutrition, supplement protein.  Ensure shakes are an easy way to get supplemental protein. Otherwise, you could look for a protein powder derived from pea protein or whey protein and add the powder to a shake or smoothie to get additional protein.      Chicken soup is a great way to replace fluids if you have diarrhea and become dehydrated.  Use imodium (as directed) if you need it to control diarrhea.      To avoid dizziness spells, let's STOP lisinopril, because dizziness can be caused by blood pressure that is too low.  If your dizziness/vertigo is really severe, then use the medication meclizine (Antivert) to help with vertigo.    Take furosemide (LASIX) if and only if, your measured blood pressure in the AM is greater than 160/100.

## 2023-01-12 NOTE — TELEPHONE ENCOUNTER
Jackie called back, Quiana is still above the income threshold for assistance so they are okay with paying the copay. Informed [facility] staff.

## 2023-01-12 NOTE — TELEPHONE ENCOUNTER
Calling patient to screen her for copay assistance for the new year.    I spoke with patients daughter Jackie, she will look into the patients income to see if she is eligible this year for copay assistance. If not the patients copay will be $3110.75.

## 2023-01-13 DIAGNOSIS — C90.00 MULTIPLE MYELOMA NOT HAVING ACHIEVED REMISSION (H): Primary | ICD-10-CM

## 2023-01-16 ENCOUNTER — INFUSION THERAPY VISIT (OUTPATIENT)
Dept: INFUSION THERAPY | Facility: CLINIC | Age: 88
End: 2023-01-16
Attending: INTERNAL MEDICINE
Payer: MEDICARE

## 2023-01-16 VITALS
RESPIRATION RATE: 20 BRPM | OXYGEN SATURATION: 95 % | DIASTOLIC BLOOD PRESSURE: 74 MMHG | HEART RATE: 84 BPM | WEIGHT: 125.8 LBS | BODY MASS INDEX: 23.01 KG/M2 | SYSTOLIC BLOOD PRESSURE: 117 MMHG | TEMPERATURE: 98.2 F

## 2023-01-16 DIAGNOSIS — C90.00 MULTIPLE MYELOMA NOT HAVING ACHIEVED REMISSION (H): Primary | ICD-10-CM

## 2023-01-16 DIAGNOSIS — C90.00 MULTIPLE MYELOMA NOT HAVING ACHIEVED REMISSION (H): ICD-10-CM

## 2023-01-16 LAB
ALBUMIN SERPL-MCNC: 3.5 G/DL (ref 3.4–5)
ALP SERPL-CCNC: 75 U/L (ref 40–150)
ALT SERPL W P-5'-P-CCNC: 12 U/L (ref 0–50)
ANION GAP SERPL CALCULATED.3IONS-SCNC: 8 MMOL/L (ref 3–14)
AST SERPL W P-5'-P-CCNC: 10 U/L (ref 0–45)
BASOPHILS # BLD AUTO: 0 10E3/UL (ref 0–0.2)
BASOPHILS NFR BLD AUTO: 1 %
BILIRUB SERPL-MCNC: 0.9 MG/DL (ref 0.2–1.3)
BUN SERPL-MCNC: 28 MG/DL (ref 7–30)
CALCIUM SERPL-MCNC: 9.3 MG/DL (ref 8.5–10.1)
CHLORIDE BLD-SCNC: 105 MMOL/L (ref 94–109)
CO2 SERPL-SCNC: 26 MMOL/L (ref 20–32)
CREAT SERPL-MCNC: 0.75 MG/DL (ref 0.52–1.04)
ELLIPTOCYTES BLD QL SMEAR: ABNORMAL
EOSINOPHIL # BLD AUTO: 0 10E3/UL (ref 0–0.7)
EOSINOPHIL NFR BLD AUTO: 0 %
ERYTHROCYTE [DISTWIDTH] IN BLOOD BY AUTOMATED COUNT: 15.5 % (ref 10–15)
GFR SERPL CREATININE-BSD FRML MDRD: 77 ML/MIN/1.73M2
GLUCOSE BLD-MCNC: 96 MG/DL (ref 70–99)
HCT VFR BLD AUTO: 31.4 % (ref 35–47)
HGB BLD-MCNC: 11 G/DL (ref 11.7–15.7)
IMM GRANULOCYTES # BLD: 0 10E3/UL
IMM GRANULOCYTES NFR BLD: 0 %
LYMPHOCYTES # BLD AUTO: 2 10E3/UL (ref 0.8–5.3)
LYMPHOCYTES NFR BLD AUTO: 30 %
MCH RBC QN AUTO: 31.6 PG (ref 26.5–33)
MCHC RBC AUTO-ENTMCNC: 35 G/DL (ref 31.5–36.5)
MCV RBC AUTO: 90 FL (ref 78–100)
MONOCYTES # BLD AUTO: 1.4 10E3/UL (ref 0–1.3)
MONOCYTES NFR BLD AUTO: 21 %
NEUTROPHILS # BLD AUTO: 3.2 10E3/UL (ref 1.6–8.3)
NEUTROPHILS NFR BLD AUTO: 48 %
NRBC # BLD AUTO: 0 10E3/UL
NRBC BLD AUTO-RTO: 0 /100
PLAT MORPH BLD: ABNORMAL
PLATELET # BLD AUTO: 150 10E3/UL (ref 150–450)
POTASSIUM BLD-SCNC: 4.1 MMOL/L (ref 3.4–5.3)
PROT SERPL-MCNC: 7.1 G/DL (ref 6.8–8.8)
RBC # BLD AUTO: 3.48 10E6/UL (ref 3.8–5.2)
RBC MORPH BLD: ABNORMAL
SODIUM SERPL-SCNC: 139 MMOL/L (ref 133–144)
WBC # BLD AUTO: 6.7 10E3/UL (ref 4–11)

## 2023-01-16 PROCEDURE — 80053 COMPREHEN METABOLIC PANEL: CPT | Performed by: NURSE PRACTITIONER

## 2023-01-16 PROCEDURE — 250N000011 HC RX IP 250 OP 636: Performed by: INTERNAL MEDICINE

## 2023-01-16 PROCEDURE — 36415 COLL VENOUS BLD VENIPUNCTURE: CPT

## 2023-01-16 PROCEDURE — 96401 CHEMO ANTI-NEOPL SQ/IM: CPT

## 2023-01-16 PROCEDURE — 85004 AUTOMATED DIFF WBC COUNT: CPT | Performed by: NURSE PRACTITIONER

## 2023-01-16 RX ORDER — LENALIDOMIDE 10 MG/1
10 CAPSULE ORAL DAILY
Qty: 14 CAPSULE | Refills: 0 | Status: SHIPPED | OUTPATIENT
Start: 2023-01-16 | End: 2023-04-03

## 2023-01-16 RX ADMIN — DARATUMUMAB AND HYALURONIDASE-FIHJ (HUMAN RECOMBINANT) 1800 MG: 1800; 30000 INJECTION SUBCUTANEOUS at 10:03

## 2023-01-16 ASSESSMENT — PAIN SCALES - GENERAL: PAINLEVEL: NO PAIN (0)

## 2023-01-16 NOTE — PROGRESS NOTES
Medical Assistant Note:  Quiana Dunaway presents today for lab draw.    Patient seen by provider today: No.   present during visit today: Not Applicable.    Concerns: No Concerns.    Procedure:  Lab draw site: RAC, Needle type: BF, Gauge: 23. Gauze and coban applied    Post Assessment:  Labs drawn without difficulty: Yes.    Discharge Plan:  Departure Mode: Ambulatory.    Face to Face Time: 5.    Sultana Jones CMA

## 2023-01-16 NOTE — PROGRESS NOTES
Infusion Nursing Note:  Quiana Dunaway presents today for Cycle 2 Day 22 Darzalex FasPro.    Patient seen by provider today: No   present during visit today: Not Applicable.    Note: N/A.    Intravenous Access:  No Intravenous access/labs at this visit.    Treatment Conditions:  Lab Results   Component Value Date    HGB 11.0 (L) 01/16/2023    WBC 6.7 01/16/2023    ANEU 0.3 (LL) 12/10/2022    ANEUTAUTO 3.2 01/16/2023     01/16/2023      Lab Results   Component Value Date     01/16/2023    POTASSIUM 4.1 01/16/2023    MAG 2.4 (H) 05/26/2020    CR 0.75 01/16/2023    HUMBEROT 9.3 01/16/2023    BILITOTAL 0.9 01/16/2023    ALBUMIN 3.5 01/16/2023    ALT 12 01/16/2023    AST 10 01/16/2023     Results reviewed, labs MET treatment parameters, ok to proceed with treatment.    Post Infusion Assessment:  Patient tolerated injection in lower left abdominal quadrant without incident.     Discharge Plan:   Patient declined prescription refills.  Discharge instructions reviewed with: Patient.  Patient verbalized understanding of discharge instructions and all questions answered.  AVS to patient via Shortlist.  Patient will return 1/23/23 for next appointment.   Patient discharged in stable condition accompanied by: self.  Departure Mode: Ambulatory.      Isabel Bethea RN

## 2023-01-19 DIAGNOSIS — C90.00 MULTIPLE MYELOMA NOT HAVING ACHIEVED REMISSION (H): Primary | ICD-10-CM

## 2023-01-19 RX ORDER — DEXAMETHASONE 4 MG/1
12 TABLET ORAL
Status: CANCELLED | OUTPATIENT
Start: 2023-02-06

## 2023-01-19 RX ORDER — ALBUTEROL SULFATE 0.83 MG/ML
2.5 SOLUTION RESPIRATORY (INHALATION)
Status: CANCELLED | OUTPATIENT
Start: 2023-01-23

## 2023-01-19 RX ORDER — METHYLPREDNISOLONE SODIUM SUCCINATE 125 MG/2ML
125 INJECTION, POWDER, LYOPHILIZED, FOR SOLUTION INTRAMUSCULAR; INTRAVENOUS
Status: CANCELLED
Start: 2023-01-23

## 2023-01-19 RX ORDER — EPINEPHRINE 1 MG/ML
0.3 INJECTION, SOLUTION INTRAMUSCULAR; SUBCUTANEOUS EVERY 5 MIN PRN
Status: CANCELLED | OUTPATIENT
Start: 2023-01-23

## 2023-01-19 RX ORDER — MEPERIDINE HYDROCHLORIDE 25 MG/ML
25 INJECTION INTRAMUSCULAR; INTRAVENOUS; SUBCUTANEOUS EVERY 30 MIN PRN
Status: CANCELLED | OUTPATIENT
Start: 2023-02-06

## 2023-01-19 RX ORDER — HEPARIN SODIUM,PORCINE 10 UNIT/ML
5 VIAL (ML) INTRAVENOUS
Status: CANCELLED | OUTPATIENT
Start: 2023-01-23

## 2023-01-19 RX ORDER — EPINEPHRINE 1 MG/ML
0.3 INJECTION, SOLUTION INTRAMUSCULAR; SUBCUTANEOUS EVERY 5 MIN PRN
Status: CANCELLED | OUTPATIENT
Start: 2023-02-06

## 2023-01-19 RX ORDER — ALBUTEROL SULFATE 90 UG/1
1-2 AEROSOL, METERED RESPIRATORY (INHALATION)
Status: CANCELLED
Start: 2023-02-06

## 2023-01-19 RX ORDER — ACETAMINOPHEN 325 MG/1
650 TABLET ORAL
Status: CANCELLED | OUTPATIENT
Start: 2023-01-23

## 2023-01-19 RX ORDER — DIPHENHYDRAMINE HYDROCHLORIDE 50 MG/ML
50 INJECTION INTRAMUSCULAR; INTRAVENOUS
Status: CANCELLED
Start: 2023-01-23

## 2023-01-19 RX ORDER — DEXAMETHASONE 4 MG/1
12 TABLET ORAL
Status: CANCELLED | OUTPATIENT
Start: 2023-01-23

## 2023-01-19 RX ORDER — ALBUTEROL SULFATE 90 UG/1
1-2 AEROSOL, METERED RESPIRATORY (INHALATION)
Status: CANCELLED
Start: 2023-01-23

## 2023-01-19 RX ORDER — METHYLPREDNISOLONE SODIUM SUCCINATE 125 MG/2ML
125 INJECTION, POWDER, LYOPHILIZED, FOR SOLUTION INTRAMUSCULAR; INTRAVENOUS
Status: CANCELLED
Start: 2023-02-06

## 2023-01-19 RX ORDER — DIPHENHYDRAMINE HCL 25 MG
50 CAPSULE ORAL
Status: CANCELLED | OUTPATIENT
Start: 2023-01-23

## 2023-01-19 RX ORDER — MEPERIDINE HYDROCHLORIDE 25 MG/ML
25 INJECTION INTRAMUSCULAR; INTRAVENOUS; SUBCUTANEOUS EVERY 30 MIN PRN
Status: CANCELLED | OUTPATIENT
Start: 2023-01-23

## 2023-01-19 RX ORDER — HEPARIN SODIUM,PORCINE 10 UNIT/ML
5 VIAL (ML) INTRAVENOUS
Status: CANCELLED | OUTPATIENT
Start: 2023-02-06

## 2023-01-19 RX ORDER — LORAZEPAM 2 MG/ML
0.5 INJECTION INTRAMUSCULAR EVERY 4 HOURS PRN
Status: CANCELLED | OUTPATIENT
Start: 2023-02-06

## 2023-01-19 RX ORDER — ALBUTEROL SULFATE 0.83 MG/ML
2.5 SOLUTION RESPIRATORY (INHALATION)
Status: CANCELLED | OUTPATIENT
Start: 2023-02-06

## 2023-01-19 RX ORDER — DIPHENHYDRAMINE HCL 25 MG
50 CAPSULE ORAL
Status: CANCELLED | OUTPATIENT
Start: 2023-02-06

## 2023-01-19 RX ORDER — DIPHENHYDRAMINE HYDROCHLORIDE 50 MG/ML
50 INJECTION INTRAMUSCULAR; INTRAVENOUS
Status: CANCELLED
Start: 2023-02-06

## 2023-01-19 RX ORDER — LORAZEPAM 2 MG/ML
0.5 INJECTION INTRAMUSCULAR EVERY 4 HOURS PRN
Status: CANCELLED | OUTPATIENT
Start: 2023-01-23

## 2023-01-19 RX ORDER — HEPARIN SODIUM (PORCINE) LOCK FLUSH IV SOLN 100 UNIT/ML 100 UNIT/ML
5 SOLUTION INTRAVENOUS
Status: CANCELLED | OUTPATIENT
Start: 2023-02-06

## 2023-01-19 RX ORDER — ACETAMINOPHEN 325 MG/1
650 TABLET ORAL
Status: CANCELLED | OUTPATIENT
Start: 2023-02-06

## 2023-01-19 RX ORDER — HEPARIN SODIUM (PORCINE) LOCK FLUSH IV SOLN 100 UNIT/ML 100 UNIT/ML
5 SOLUTION INTRAVENOUS
Status: CANCELLED | OUTPATIENT
Start: 2023-01-23

## 2023-01-23 ENCOUNTER — LAB (OUTPATIENT)
Dept: INFUSION THERAPY | Facility: CLINIC | Age: 88
End: 2023-01-23
Attending: INTERNAL MEDICINE
Payer: MEDICARE

## 2023-01-23 ENCOUNTER — DOCUMENTATION ONLY (OUTPATIENT)
Dept: PHARMACY | Facility: CLINIC | Age: 88
End: 2023-01-23

## 2023-01-23 VITALS
WEIGHT: 128.2 LBS | RESPIRATION RATE: 18 BRPM | BODY MASS INDEX: 23.59 KG/M2 | OXYGEN SATURATION: 97 % | SYSTOLIC BLOOD PRESSURE: 110 MMHG | HEIGHT: 62 IN | DIASTOLIC BLOOD PRESSURE: 63 MMHG | HEART RATE: 83 BPM | TEMPERATURE: 97.9 F

## 2023-01-23 DIAGNOSIS — C90.00 MULTIPLE MYELOMA NOT HAVING ACHIEVED REMISSION (H): Primary | ICD-10-CM

## 2023-01-23 LAB
BASOPHILS # BLD AUTO: 0 10E3/UL (ref 0–0.2)
BASOPHILS NFR BLD AUTO: 0 %
EOSINOPHIL # BLD AUTO: 0 10E3/UL (ref 0–0.7)
EOSINOPHIL NFR BLD AUTO: 1 %
ERYTHROCYTE [DISTWIDTH] IN BLOOD BY AUTOMATED COUNT: 15.3 % (ref 10–15)
HCT VFR BLD AUTO: 31.7 % (ref 35–47)
HGB BLD-MCNC: 10.5 G/DL (ref 11.7–15.7)
IMM GRANULOCYTES # BLD: 0 10E3/UL
IMM GRANULOCYTES NFR BLD: 0 %
LYMPHOCYTES # BLD AUTO: 1.3 10E3/UL (ref 0.8–5.3)
LYMPHOCYTES NFR BLD AUTO: 31 %
MCH RBC QN AUTO: 31.2 PG (ref 26.5–33)
MCHC RBC AUTO-ENTMCNC: 33.1 G/DL (ref 31.5–36.5)
MCV RBC AUTO: 94 FL (ref 78–100)
MONOCYTES # BLD AUTO: 0.6 10E3/UL (ref 0–1.3)
MONOCYTES NFR BLD AUTO: 14 %
NEUTROPHILS # BLD AUTO: 2.1 10E3/UL (ref 1.6–8.3)
NEUTROPHILS NFR BLD AUTO: 54 %
NRBC # BLD AUTO: 0 10E3/UL
NRBC BLD AUTO-RTO: 0 /100
PLATELET # BLD AUTO: 117 10E3/UL (ref 150–450)
RBC # BLD AUTO: 3.37 10E6/UL (ref 3.8–5.2)
WBC # BLD AUTO: 4 10E3/UL (ref 4–11)

## 2023-01-23 PROCEDURE — 85004 AUTOMATED DIFF WBC COUNT: CPT | Performed by: NURSE PRACTITIONER

## 2023-01-23 PROCEDURE — 250N000011 HC RX IP 250 OP 636: Performed by: NURSE PRACTITIONER

## 2023-01-23 PROCEDURE — 84165 PROTEIN E-PHORESIS SERUM: CPT | Mod: 26

## 2023-01-23 PROCEDURE — 82784 ASSAY IGA/IGD/IGG/IGM EACH: CPT | Performed by: NURSE PRACTITIONER

## 2023-01-23 PROCEDURE — 84155 ASSAY OF PROTEIN SERUM: CPT | Performed by: NURSE PRACTITIONER

## 2023-01-23 PROCEDURE — 96401 CHEMO ANTI-NEOPL SQ/IM: CPT

## 2023-01-23 PROCEDURE — 36415 COLL VENOUS BLD VENIPUNCTURE: CPT | Performed by: NURSE PRACTITIONER

## 2023-01-23 PROCEDURE — 84165 PROTEIN E-PHORESIS SERUM: CPT | Mod: TC | Performed by: PATHOLOGY

## 2023-01-23 PROCEDURE — 83521 IG LIGHT CHAINS FREE EACH: CPT | Performed by: NURSE PRACTITIONER

## 2023-01-23 RX ADMIN — DARATUMUMAB AND HYALURONIDASE-FIHJ (HUMAN RECOMBINANT) 1800 MG: 1800; 30000 INJECTION SUBCUTANEOUS at 11:52

## 2023-01-23 NOTE — PROGRESS NOTES
Oral Chemotherapy Monitoring Program  Lab Follow Up    Reviewed lab results from 1/23/23.    ORAL CHEMOTHERAPY 11/29/2021 7/13/2022 9/16/2022 12/5/2022 12/26/2022 1/16/2023 1/23/2023   Assessment Type Lab Monitoring Lab Monitoring Lab Monitoring Refill Chart Review;Lab Monitoring;Refill Refill Lab Monitoring   Diagnosis Code Multiple Myeloma Multiple Myeloma Multiple Myeloma Multiple Myeloma Multiple Myeloma Multiple Myeloma Multiple Myeloma   Providers Sohan Parry   Clinic Name/Location [facility]   Drug Name Revlimid (lenalidomide) Revlimid (lenalidomide) Revlimid (lenalidomide) Revlimid (lenalidomide) Revlimid (lenalidomide) Revlimid (lenalidomide) Revlimid (lenalidomide)   Dose 10 mg 10 mg 10 mg 10 mg 10 mg 10 mg 10 mg   Current Schedule Daily Daily Daily Daily Daily Daily Daily   Cycle Details 2 weeks on, 1 week off 2 weeks on, 1 week off 2 weeks on, 1 week off 2 weeks on, 1 week off 2 weeks on, 1 week off 2 weeks on, 1 week off 2 weeks on, 1 week off   Start Date of Last Cycle 11/12/2021 6/23/2022 - 11/21/2022 12/12/2022 - 1/23/2023   Planned next cycle start date 12/3/2021 7/14/2022 - 12/12/2022 1/2/2023 - 2/13/2023   Doses missed in last 2 weeks - - - - - - -   Adherence Assessment - - - - Adherent - -   Adverse Effects - Anemia;Neutropenia;Thrombocytopenia - - - - -   Anemia - Grade 1 - - - - -   Pharmacist Intervention(anemia) - No - - - - -   Fatigue - - - - - - -   Pharmacist Intervention(fatigue) - - - - - - -   Neutropenia - Grade 2 - - - - -   Pharmacist Intervention(neutropenia) - No - - - - -   Thrombocytopenia - Grade 1 - - - - -   Pharmacist Intervention(thrombocytopenia) - No - - - - -   Home BPs - - - - - - -   Any new drug interactions? - - - - No - -   Is the dose as ordered appropriate for the patient? - - - - Yes - -   Is the patient currently in pain? - - - - - - -   Has the patient been  assessed within the past 6 months for depression? - - - - - - -   Has the patient missed any days of school, work, or other routine activity? - - - - - - -   Since the last time we talked, have you been hospitalized or used the emergency room? - - - - - - -       Labs:  _  Result Component Current Result Ref Range   Sodium 139 (1/16/2023) 133 - 144 mmol/L     _  Result Component Current Result Ref Range   Potassium 4.1 (1/16/2023) 3.4 - 5.3 mmol/L     _  Result Component Current Result Ref Range   Calcium 9.3 (1/16/2023) 8.5 - 10.1 mg/dL     No results found for Mag within last 30 days.     No results found for Phos within last 30 days.     _  Result Component Current Result Ref Range   Albumin 3.5 (1/16/2023) 3.4 - 5.0 g/dL     _  Result Component Current Result Ref Range   Urea Nitrogen 28 (1/16/2023) 7 - 30 mg/dL     _  Result Component Current Result Ref Range   Creatinine 0.75 (1/16/2023) 0.52 - 1.04 mg/dL     _  Result Component Current Result Ref Range   AST 10 (1/16/2023) 0 - 45 U/L     _  Result Component Current Result Ref Range   ALT 12 (1/16/2023) 0 - 50 U/L     _  Result Component Current Result Ref Range   Bilirubin Total 0.9 (1/16/2023) 0.2 - 1.3 mg/dL     _  Result Component Current Result Ref Range   WBC Count 4.0 (1/23/2023) 4.0 - 11.0 10e3/uL     _  Result Component Current Result Ref Range   Hemoglobin 10.5 (L) (1/23/2023) 11.7 - 15.7 g/dL     _  Result Component Current Result Ref Range   Platelet Count 117 (L) (1/23/2023) 150 - 450 10e3/uL     No results found for ANC within last 30 days.     _  Result Component Current Result Ref Range   Absolute Neutrophils 2.1 (1/23/2023) 1.6 - 8.3 10e3/uL        Assessment & Plan:  No concerning abnormalities. Ok to proceed with next cycle as planned.    Questions answered to patient's satisfaction.    Follow-Up:  4 weeks with repeat labs (Dr. Sohan potter with only monthly)    Debra Ortega PharmD  January 23, 2023

## 2023-01-23 NOTE — PROGRESS NOTES
Infusion Nursing Note:  Quiana Dunaway presents today for Darzalex Faspro .    Patient seen by provider today: No   present during visit today: Not Applicable.    Note: Pt reports no new concerns today. Med administered to the R side of abdomen per pt request. Site CDI.    Intravenous Access:  No Intravenous access/labs at this visit.    Treatment Conditions:  Lab Results   Component Value Date    HGB 10.5 (L) 01/23/2023    WBC 4.0 01/23/2023    ANEU 0.3 (LL) 12/10/2022    ANEUTAUTO 2.1 01/23/2023     (L) 01/23/2023      Lab Results   Component Value Date     01/16/2023    POTASSIUM 4.1 01/16/2023    MAG 2.4 (H) 05/26/2020    CR 0.75 01/16/2023    HUMBERTO 9.3 01/16/2023    BILITOTAL 0.9 01/16/2023    ALBUMIN 3.5 01/16/2023    ALT 12 01/16/2023    AST 10 01/16/2023     Results reviewed, labs MET treatment parameters, ok to proceed with treatment.    Post Infusion Assessment:  Patient tolerated injection without incident.  Site patent and intact, free from redness, edema or discomfort.  No evidence of extravasations.  Access discontinued per protocol.     Discharge Plan:   Patient declined prescription refills.  Discharge instructions reviewed with: Patient.  Patient and/or family verbalized understanding of discharge instructions and all questions answered.  AVS to patient via [employer].  Patient will return 1/30/23 for next appointment.   Patient discharged in stable condition accompanied by: daughter-in-law.  Departure Mode: Ambulatory+ walker.      Aide Good RN

## 2023-01-25 LAB
ALBUMIN SERPL ELPH-MCNC: 4.1 G/DL (ref 3.7–5.1)
ALPHA1 GLOB SERPL ELPH-MCNC: 0.3 G/DL (ref 0.2–0.4)
ALPHA2 GLOB SERPL ELPH-MCNC: 0.6 G/DL (ref 0.5–0.9)
B-GLOBULIN SERPL ELPH-MCNC: 0.6 G/DL (ref 0.6–1)
GAMMA GLOB SERPL ELPH-MCNC: 1 G/DL (ref 0.7–1.6)
IGG SERPL-MCNC: 1161 MG/DL (ref 610–1616)
KAPPA LC FREE SER-MCNC: 55.54 MG/DL (ref 0.33–1.94)
KAPPA LC FREE/LAMBDA FREE SER NEPH: 158.69 {RATIO} (ref 0.26–1.65)
LAMBDA LC FREE SERPL-MCNC: 0.35 MG/DL (ref 0.57–2.63)
M PROTEIN SERPL ELPH-MCNC: 0.8 G/DL
PROT PATTERN SERPL ELPH-IMP: ABNORMAL
TOTAL PROTEIN SERUM FOR ELP: 6.6 G/DL (ref 6.4–8.3)

## 2023-02-03 ENCOUNTER — TELEPHONE (OUTPATIENT)
Dept: ONCOLOGY | Facility: CLINIC | Age: 88
End: 2023-02-03
Payer: MEDICARE

## 2023-02-03 DIAGNOSIS — C90.00 MULTIPLE MYELOMA NOT HAVING ACHIEVED REMISSION (H): Primary | ICD-10-CM

## 2023-02-03 RX ORDER — LENALIDOMIDE 10 MG/1
10 CAPSULE ORAL DAILY
Qty: 14 CAPSULE | Refills: 0 | Status: SHIPPED | OUTPATIENT
Start: 2023-02-03 | End: 2023-04-03

## 2023-02-03 NOTE — TELEPHONE ENCOUNTER
Oral Chemotherapy Monitoring Program   Medication: Revlimid   Rx: 10mg PO daily on days 1 through 14 of 21 day cycle   Auth #: [id number]  Risk Category: Adult Female not of reproductive potential  Routine survey questions reviewed.   Rx to be Escribed to SP

## 2023-02-06 ENCOUNTER — LAB (OUTPATIENT)
Dept: INFUSION THERAPY | Facility: CLINIC | Age: 88
End: 2023-02-06
Attending: INTERNAL MEDICINE
Payer: MEDICARE

## 2023-02-06 ENCOUNTER — ONCOLOGY VISIT (OUTPATIENT)
Dept: ONCOLOGY | Facility: CLINIC | Age: 88
End: 2023-02-06
Attending: INTERNAL MEDICINE
Payer: MEDICARE

## 2023-02-06 VITALS
DIASTOLIC BLOOD PRESSURE: 65 MMHG | OXYGEN SATURATION: 97 % | WEIGHT: 128 LBS | HEART RATE: 85 BPM | BODY MASS INDEX: 23.49 KG/M2 | TEMPERATURE: 98.4 F | RESPIRATION RATE: 16 BRPM | SYSTOLIC BLOOD PRESSURE: 117 MMHG

## 2023-02-06 DIAGNOSIS — C90.00 MULTIPLE MYELOMA NOT HAVING ACHIEVED REMISSION (H): Primary | ICD-10-CM

## 2023-02-06 DIAGNOSIS — D70.1 CHEMOTHERAPY-INDUCED NEUTROPENIA (H): ICD-10-CM

## 2023-02-06 DIAGNOSIS — T45.1X5A CHEMOTHERAPY-INDUCED NEUTROPENIA (H): ICD-10-CM

## 2023-02-06 LAB
BASOPHILS # BLD AUTO: 0 10E3/UL (ref 0–0.2)
BASOPHILS NFR BLD AUTO: 1 %
ELLIPTOCYTES BLD QL SMEAR: SLIGHT
EOSINOPHIL # BLD AUTO: 0 10E3/UL (ref 0–0.7)
EOSINOPHIL NFR BLD AUTO: 0 %
ERYTHROCYTE [DISTWIDTH] IN BLOOD BY AUTOMATED COUNT: 14.7 % (ref 10–15)
HCT VFR BLD AUTO: 32.2 % (ref 35–47)
HGB BLD-MCNC: 10.5 G/DL (ref 11.7–15.7)
HOLD SPECIMEN: NORMAL
IMM GRANULOCYTES # BLD: 0 10E3/UL
IMM GRANULOCYTES NFR BLD: 0 %
LYMPHOCYTES # BLD AUTO: 1.5 10E3/UL (ref 0.8–5.3)
LYMPHOCYTES NFR BLD AUTO: 46 %
MCH RBC QN AUTO: 30.3 PG (ref 26.5–33)
MCHC RBC AUTO-ENTMCNC: 32.6 G/DL (ref 31.5–36.5)
MCV RBC AUTO: 93 FL (ref 78–100)
MONOCYTES # BLD AUTO: 0.7 10E3/UL (ref 0–1.3)
MONOCYTES NFR BLD AUTO: 23 %
NEUTROPHILS # BLD AUTO: 0.9 10E3/UL (ref 1.6–8.3)
NEUTROPHILS NFR BLD AUTO: 30 %
NRBC # BLD AUTO: 0 10E3/UL
NRBC BLD AUTO-RTO: 0 /100
PLAT MORPH BLD: ABNORMAL
PLATELET # BLD AUTO: 114 10E3/UL (ref 150–450)
RBC # BLD AUTO: 3.46 10E6/UL (ref 3.8–5.2)
RBC MORPH BLD: ABNORMAL
WBC # BLD AUTO: 3.1 10E3/UL (ref 4–11)

## 2023-02-06 PROCEDURE — 99215 OFFICE O/P EST HI 40 MIN: CPT | Performed by: INTERNAL MEDICINE

## 2023-02-06 PROCEDURE — 85025 COMPLETE CBC W/AUTO DIFF WBC: CPT | Performed by: NURSE PRACTITIONER

## 2023-02-06 PROCEDURE — G0463 HOSPITAL OUTPT CLINIC VISIT: HCPCS | Mod: 25 | Performed by: INTERNAL MEDICINE

## 2023-02-06 PROCEDURE — 36415 COLL VENOUS BLD VENIPUNCTURE: CPT | Performed by: NURSE PRACTITIONER

## 2023-02-06 PROCEDURE — 96401 CHEMO ANTI-NEOPL SQ/IM: CPT

## 2023-02-06 PROCEDURE — 250N000011 HC RX IP 250 OP 636: Performed by: NURSE PRACTITIONER

## 2023-02-06 PROCEDURE — 96372 THER/PROPH/DIAG INJ SC/IM: CPT | Mod: XS | Performed by: INTERNAL MEDICINE

## 2023-02-06 PROCEDURE — 250N000011 HC RX IP 250 OP 636: Mod: JB | Performed by: INTERNAL MEDICINE

## 2023-02-06 RX ORDER — HEPARIN SODIUM,PORCINE 10 UNIT/ML
5 VIAL (ML) INTRAVENOUS
Status: CANCELLED | OUTPATIENT
Start: 2023-02-06

## 2023-02-06 RX ORDER — ALBUTEROL SULFATE 90 UG/1
1-2 AEROSOL, METERED RESPIRATORY (INHALATION)
Status: CANCELLED
Start: 2023-02-06

## 2023-02-06 RX ORDER — DIPHENHYDRAMINE HYDROCHLORIDE 50 MG/ML
50 INJECTION INTRAMUSCULAR; INTRAVENOUS
Status: CANCELLED
Start: 2023-03-06

## 2023-02-06 RX ORDER — ACETAMINOPHEN 325 MG/1
650 TABLET ORAL
Status: CANCELLED | OUTPATIENT
Start: 2023-03-06

## 2023-02-06 RX ORDER — DEXAMETHASONE 4 MG/1
12 TABLET ORAL
Status: CANCELLED | OUTPATIENT
Start: 2023-03-06

## 2023-02-06 RX ORDER — HEPARIN SODIUM (PORCINE) LOCK FLUSH IV SOLN 100 UNIT/ML 100 UNIT/ML
5 SOLUTION INTRAVENOUS
Status: CANCELLED | OUTPATIENT
Start: 2023-02-06

## 2023-02-06 RX ORDER — ALBUTEROL SULFATE 90 UG/1
1-2 AEROSOL, METERED RESPIRATORY (INHALATION)
Status: CANCELLED
Start: 2023-02-20

## 2023-02-06 RX ORDER — MEPERIDINE HYDROCHLORIDE 25 MG/ML
25 INJECTION INTRAMUSCULAR; INTRAVENOUS; SUBCUTANEOUS EVERY 30 MIN PRN
Status: CANCELLED | OUTPATIENT
Start: 2023-03-06

## 2023-02-06 RX ORDER — MEPERIDINE HYDROCHLORIDE 25 MG/ML
25 INJECTION INTRAMUSCULAR; INTRAVENOUS; SUBCUTANEOUS EVERY 30 MIN PRN
Status: CANCELLED | OUTPATIENT
Start: 2023-02-06

## 2023-02-06 RX ORDER — ALBUTEROL SULFATE 90 UG/1
1-2 AEROSOL, METERED RESPIRATORY (INHALATION)
Status: CANCELLED
Start: 2023-03-06

## 2023-02-06 RX ORDER — DEXAMETHASONE 4 MG/1
12 TABLET ORAL
Status: CANCELLED | OUTPATIENT
Start: 2023-02-20

## 2023-02-06 RX ORDER — EPINEPHRINE 1 MG/ML
0.3 INJECTION, SOLUTION INTRAMUSCULAR; SUBCUTANEOUS EVERY 5 MIN PRN
Status: CANCELLED | OUTPATIENT
Start: 2023-02-06

## 2023-02-06 RX ORDER — HEPARIN SODIUM,PORCINE 10 UNIT/ML
5 VIAL (ML) INTRAVENOUS
Status: CANCELLED | OUTPATIENT
Start: 2023-02-20

## 2023-02-06 RX ORDER — HEPARIN SODIUM,PORCINE 10 UNIT/ML
5 VIAL (ML) INTRAVENOUS
Status: CANCELLED | OUTPATIENT
Start: 2023-03-06

## 2023-02-06 RX ORDER — HEPARIN SODIUM (PORCINE) LOCK FLUSH IV SOLN 100 UNIT/ML 100 UNIT/ML
5 SOLUTION INTRAVENOUS
Status: CANCELLED | OUTPATIENT
Start: 2023-02-20

## 2023-02-06 RX ORDER — EPINEPHRINE 1 MG/ML
0.3 INJECTION, SOLUTION INTRAMUSCULAR; SUBCUTANEOUS EVERY 5 MIN PRN
Status: CANCELLED | OUTPATIENT
Start: 2023-03-06

## 2023-02-06 RX ORDER — METHYLPREDNISOLONE SODIUM SUCCINATE 125 MG/2ML
125 INJECTION, POWDER, LYOPHILIZED, FOR SOLUTION INTRAMUSCULAR; INTRAVENOUS
Status: CANCELLED
Start: 2023-02-06

## 2023-02-06 RX ORDER — METHYLPREDNISOLONE SODIUM SUCCINATE 125 MG/2ML
125 INJECTION, POWDER, LYOPHILIZED, FOR SOLUTION INTRAMUSCULAR; INTRAVENOUS
Status: CANCELLED
Start: 2023-03-06

## 2023-02-06 RX ORDER — MEPERIDINE HYDROCHLORIDE 25 MG/ML
25 INJECTION INTRAMUSCULAR; INTRAVENOUS; SUBCUTANEOUS EVERY 30 MIN PRN
Status: CANCELLED | OUTPATIENT
Start: 2023-02-20

## 2023-02-06 RX ORDER — METHYLPREDNISOLONE SODIUM SUCCINATE 125 MG/2ML
125 INJECTION, POWDER, LYOPHILIZED, FOR SOLUTION INTRAMUSCULAR; INTRAVENOUS
Status: CANCELLED
Start: 2023-02-20

## 2023-02-06 RX ORDER — DIPHENHYDRAMINE HYDROCHLORIDE 50 MG/ML
50 INJECTION INTRAMUSCULAR; INTRAVENOUS
Status: CANCELLED
Start: 2023-02-20

## 2023-02-06 RX ORDER — ACETAMINOPHEN 325 MG/1
650 TABLET ORAL
Status: CANCELLED | OUTPATIENT
Start: 2023-02-20

## 2023-02-06 RX ORDER — LORAZEPAM 2 MG/ML
0.5 INJECTION INTRAMUSCULAR EVERY 4 HOURS PRN
Status: CANCELLED | OUTPATIENT
Start: 2023-03-06

## 2023-02-06 RX ORDER — DIPHENHYDRAMINE HYDROCHLORIDE 50 MG/ML
50 INJECTION INTRAMUSCULAR; INTRAVENOUS
Status: CANCELLED
Start: 2023-02-06

## 2023-02-06 RX ORDER — HEPARIN SODIUM (PORCINE) LOCK FLUSH IV SOLN 100 UNIT/ML 100 UNIT/ML
5 SOLUTION INTRAVENOUS
Status: CANCELLED | OUTPATIENT
Start: 2023-03-06

## 2023-02-06 RX ORDER — ALBUTEROL SULFATE 0.83 MG/ML
2.5 SOLUTION RESPIRATORY (INHALATION)
Status: CANCELLED | OUTPATIENT
Start: 2023-02-06

## 2023-02-06 RX ORDER — LORAZEPAM 2 MG/ML
0.5 INJECTION INTRAMUSCULAR EVERY 4 HOURS PRN
Status: CANCELLED | OUTPATIENT
Start: 2023-02-20

## 2023-02-06 RX ORDER — EPINEPHRINE 1 MG/ML
0.3 INJECTION, SOLUTION INTRAMUSCULAR; SUBCUTANEOUS EVERY 5 MIN PRN
Status: CANCELLED | OUTPATIENT
Start: 2023-02-20

## 2023-02-06 RX ORDER — ALBUTEROL SULFATE 0.83 MG/ML
2.5 SOLUTION RESPIRATORY (INHALATION)
Status: CANCELLED | OUTPATIENT
Start: 2023-02-20

## 2023-02-06 RX ORDER — DIPHENHYDRAMINE HCL 25 MG
50 CAPSULE ORAL
Status: CANCELLED | OUTPATIENT
Start: 2023-02-20

## 2023-02-06 RX ORDER — DIPHENHYDRAMINE HCL 25 MG
50 CAPSULE ORAL
Status: CANCELLED | OUTPATIENT
Start: 2023-03-06

## 2023-02-06 RX ORDER — ALBUTEROL SULFATE 0.83 MG/ML
2.5 SOLUTION RESPIRATORY (INHALATION)
Status: CANCELLED | OUTPATIENT
Start: 2023-03-06

## 2023-02-06 RX ADMIN — FILGRASTIM-AAFI 300 MCG: 300 INJECTION, SOLUTION SUBCUTANEOUS at 10:33

## 2023-02-06 RX ADMIN — DARATUMUMAB AND HYALURONIDASE-FIHJ (HUMAN RECOMBINANT) 1800 MG: 1800; 30000 INJECTION SUBCUTANEOUS at 10:33

## 2023-02-06 ASSESSMENT — PAIN SCALES - GENERAL: PAINLEVEL: NO PAIN (0)

## 2023-02-06 NOTE — PATIENT INSTRUCTIONS
Filgrastim Injection  Brands: Neupogen  Uses  This medicine is used for the following purposes:    blood cell disorder  bone marrow transplant  chemotherapy complications    Instructions  Read and make sure you understand the instructions for measuring dose and using syringe. If you have any questions, talk to your doctor, nurse, or pharmacist.    If using the vial, do not remove the medicine from the vial until ready to use.    The liquid should be clear and colorless.    Check the medicine before each use. If the liquid medicine has any particles in it, appears discolored, or if the vial appears damaged, do not use it.    Do not shake the medicine before using.    Keep medicine in refrigerator. Do not freeze. Protect from light.    Take the medicine out of the refrigerator about 30 minutes before use to warm to room temperature.    Injecting cold drug may be uncomfortable.    Discard unused medicine after 24 hours at room temperature.    Never use any medicine that has .    Please ask your doctor, nurse, or pharmacist how to discard unused medicines safely.    You or a family member can be trained to give this medicine at home.    Change the location of the injection each time. Choose a location at least 1 inch from the last injection.    Tell your doctor if you have ever had an allergic reaction to latex.    It is important that you keep taking each dose of this medicine on time even if you are feeling well.    If you forget to take a dose on time, take it as soon as you remember. If it is almost time for the next dose, do not take the missed dose. Return to your normal schedule. Do not take 2 doses at one time.    Drug interactions can change how medicines work or increase risk for side effects. Tell your health care providers about all medicines taken. Include prescription and over-the-counter medicines, vitamins, and herbal medicines. Speak with your doctor or pharmacist before starting or stopping any  medicine.    It is very important that you follow your doctor's instructions for all blood tests.      Cautions  Tell your doctor and pharmacist if you ever had an allergic reaction to a medicine.    This medicine is associated with a rare but very serious medical condition. Please speak with your doctor about symptoms you should look out for while on this medicine. Notify your doctor immediately if you develop those symptoms.    Some patients taking this medicine have experienced serious side effects. Please speak with your doctor to understand the risks and benefits associated with this medicine.    Do not use the medication any more than instructed.    Contact your doctor if you develop any signs of a new infection such as fever, cough, sore throat, or chills.    Wash your hands often and avoid close contact with people with infections such as colds and flu.    Tell the doctor or pharmacist if you are pregnant, planning to be pregnant, or breastfeeding.    Call your doctor right away if you notice slow or shallow breathing.    Call your doctor right away if you notice any unusual bleeding or bruising.    Ask your pharmacist how to properly throw away used needles or syringes.    Do not share this medicine with anyone who has not been prescribed this medicine.      Side Effects  The following is a list of some common side effects from this medicine. Please speak with your doctor about what you should do if you experience these or other side effects.    bone pain  nosebleeds  pain, redness, swelling near injection  kidney problems  muscle pain  Call your doctor or get medical help right away if you notice any of these more serious side effects:    pain in the abdomen  back pain  bleeding or bruising  coughing up blood or vomit that looks like coffee grounds  feeling of swelling of the body  swelling of the legs, feet, and hands  fever or chills  fast or irregular heart beats  shortness of breath  stomach  pain  dark, tarry stool  unusual or unexplained tiredness or weakness  urinating less often  blood in urine  dark urine  A few people may have an allergic reaction to this medicine. Symptoms can include difficulty breathing, skin rash, itching, swelling, or severe dizziness. If you notice any of these symptoms, seek medical help quickly.      Extra  Please speak with your doctor, nurse, or pharmacist if you have any questions about this medicine.            Coping with Neutropenia  What is neutropenia?  Neutropenia means that you have fewer white blood cells than normal.    Your white blood cells protect you from infection. If your white blood cells become too low, your risk of infection increases.    Signs of an infection include:    Any temperature more than 100.4 F (38 C) (taken under the tongue)  Shaking chills  Pain when urinating  Pain when breathing  Cough or sore throat  A red, swollen or painful cut or wound, or fluid coming from a cut or wound.  How is neutropenia treated?  If you already have an infection, your care team may give you medicine to help fight it. If you do not have an infection, you may receive:    Neulasta (pegfilgrastim)  Neupogen (filgrastim)  Other: ________________________  These medicines can help your body make more white blood cells. Your care team will tell you if medicine is right for you. You will need a number of blood tests to see how well your treatment is working.    What else can I do to prevent infection?  Wash your hands often, including before meals and after using the toilet. Hand washing is the best way to prevent infections. Remind visitors to wash their hands as well.  Take a warm shower each day and pat your skin dry.  Rinse your mouth with mild salt water four times a day.  Brush your teeth with a soft toothbrush after meals. Floss gently.  Take steps to prevent cuts or scrapes, which can lead to infection. For example: use an electric razor, be careful with sharp  objects, wear gloves when possible and don't go barefoot.  If you have a cut or scrape, wash the area with soap and water, then cover it with a clean bandage until it heals.  Use lotion to prevent cracks in your skin.  Do not cut your cuticles. Use cream removers instead. Do not wear fake fingernails.  Try to prevent constipation (hard stools). Drink plenty of fluids. If you can, eat whole grains and fresh fruits and vegetables. Ask your care team if you should take a stool softener (such as Colace).  Wipe yourself from front to back after using the toilet.  Avoid:  People who have a cold or the flu  Young children who have recently received a live vaccine  Large crowds  Fresh plants, flowers, dried tineo and dirt.  Do not have surgery or dental work.  Do not have sex until your white blood cells are back to normal. Do not use enemas, suppositories, douches or tampons.  Ask others to clean up after pets.  Always talk to your cancer doctor before receiving shots (immunization) .  When should I call my care team?  Call your care team if:    Your temperature is more than 100.4 F (38 C) (taken under the tongue).  You have chills and are shaking.  You have pain when urinating (using the toilet), or you need to pee more often than normal.  You have itching or unusual drainage from your vagina.  You have pain when breathing, or you are having a hard time breathing.  You have a cough or sore throat.  You have soft white patches on your tongue or the sides of your mouth.  You see redness, swelling or fluid draining from a cut or wound.  You suddenly feel very weak and tired.  Comments:  ____________________________________    _______________________________________    _______________________________________    _______________________________________    _______________________________________    _______________________________________    Food Safety Guidelines  Buying food  Buy foods before the expiration date listed on food  "labels. (also called the \"use by\" date.)  Check that milk, apple cider, egg and cheese products have been pasteurized. This should be printed on their labels.  Buy fruits and vegetables with unbroken skins.  Preparing food  Check the labels on your foods. Throw away any foods that are past their \"use-by\" dates.  Wash your hands before making food.  Use soap and water to wash the surfaces you use to prepare food. Dry the surfaces well. Use only clean utensils.  Wash all raw fruits and vegetables.  Keep uncooked foods separate from cooked foods.  Use one cutting board (non-wood) for raw meats, another for fruits and vegetables, and a third for prepared foods. Wash cutting boards often. This will help prevent cross-contamination.  Thaw frozen food in the refrigerator or in cold water. Do not thaw at room temperature.  Cook raw meat well. The temperature inside the meat should stay at the following for at least 15 seconds:  165 F (74 C) or higher for poultry (whole or ground chicken or turkey)  155 F (68 C) or higher for ground meat (beef, pork) or ground fish  145 F (63 C) for whole beef, pork or fish  Meals and snacks  Wash your hands before eating. Always use clean plates, glasses and utensils.  Avoid raw fish, raw meat, uncooked eggs, raw cookie dough and natural cheese. (This includes moldy cheese, such as blue cheese, as well as cheese made with unpasteurized milk).  Do not drink from a can or bottle. Wash the can or bottle before opening it, and then pour the drink into a cup.  Storing leftovers  Throw out leftovers that have been at room temperature longer than two hours.  Throw out leftovers older than two days.  When reheating leftovers, the temperature inside should be at 165 F (74 C) for at least 15 seconds.  Refrigerated foods should be stored at of 40 F (4.5 C) or less.  Dining out  Ask staff to make sure there are no raw ingredients (such as raw eggs or meat) in your food.  Ask that egg dishes be fully " cooked. Meat should be medium-well or well-done.  Avoid buffets and salad bars.

## 2023-02-06 NOTE — Clinical Note
"    2/6/2023         RE: Quiana Dunaway  [address] [facility] [address]        Dear Colleague,    Thank you for referring your patient, Quiana Dunaway, to the [facility] [place]. Please see a copy of my visit note below.    Oncology Rooming Note    February 6, 2023 8:44 AM   Quiana Dunaway is a 87 year old female who presents for:    Chief Complaint   Patient presents with     Oncology Clinic Visit     Initial Vitals: /65   Pulse 85   Temp 98.4  F (36.9  C) (Oral)   Resp 16   Wt 58.1 kg (128 lb)   SpO2 97%   BMI 23.49 kg/m   Estimated body mass index is 23.49 kg/m  as calculated from the following:    Height as of 1/23/23: 1.572 m (5' 1.89\").    Weight as of this encounter: 58.1 kg (128 lb). Body surface area is 1.59 meters squared.  No Pain (0) Comment: Data Unavailable   No LMP recorded. Patient is postmenopausal.  Allergies reviewed: Yes  Medications reviewed: Yes    Medications: Medication refills not needed today.  Pharmacy name entered into [employer]:    [facility] - [place], [place] - [address]  [id number] - [place], [place] - [address] AT [facility]    Clinical concerns:  doctor was notified.      Mariela Lyn, [facility]              HEMATOLOGY HISTORY: Mrs. Dunaway is a lady with multiple myeloma (IgG kappa). High risk, t(14;16).  1. On 05/01/2017, WBC of 3.4, hemoglobin of 10.2 and platelet of 154.  2. SPEP on 07/07/2017 revealed M-spike of 1.8.  3. Bone marrow biopsy on 07/12/2017 reveals kappa monotypic plasma cell population consistent with multiple myeloma.  Bone marrow is 70% cellular.  Plasma cell is 50-60%.     -There is gain of chromosome 1, 5, 9, 15, and 17.  There is translocation 14;16.   4.  On 07/18/2017:  -M-spike of 1.9.   -Immunofixation reveals monoclonal IgG kappa.    -IgG level of 2970.  IgA of 15 and IgM of 175.    -Kappa free light chain of 67.7.  Lambda free light " chain of 1.42.  Ratio of kappa to lambda of 47.71.    -Beta 2 microglobulin of 3.4.   5. PET scan on 07/24/2017 reveals several focal areas of increased FDG uptake consistent with multiple myeloma.  There is probable epithelial cyst in tail of the pancreas.  No associated abnormal FDG activity.  Left thyroid cysts or nodules without any FDG activity.  There is a 0.3 cm left upper lobe lung nodule.   6.  Velcade, Revlimid and dexamethasone between on 08/14/2017 and 04/30/2018. Velcade stopped.   -Revlimid and dexamethasone continued.  -Revlimid held between 12/27/2021 and 03/08/2022.  -Daratumumab added on 11/14/2022.  7. CT chest, abdomen, and pelvis on 12/30/2021 does not reveal any malignancy.     SUBJECTIVE:  Ms. Dunaway is an 87-year-old female with multiple myeloma on daratumumab, revlimid and dexamethasone. She is toleratibg it well. Zometa is on hold for dental work.    She recently had some viral infection.  She has recovered from it.    She has mild fatigue.  She is able to do activities of daily living.  No headache.  She gets dizziness.  She has vertigo.  No chest pain.  No shortness of breath.  No abdominal pain.  Occasional nausea.  No vomiting.  Appetite is fairly good.  No urinary complaints.  No bowel problem.  No bleeding.  No fever or night sweats.  She wants me to check her ear.  Sometimes she feels some fullness.  All other review of system negative.      PHYSICAL EXAMINATION:   GENERAL:  Alert and oriented x 3.   VITAL SIGNS:  Reviewed.       EYES:  No icterus.   EARS: Both the ears examined.  Ear canal is normal.  No wax.  No signs of infection.  NECK:  Supple. No lymphadenopathy. No thyromegaly.   AXILLAE:  No lymphadenopathy.   LUNGS:  Good air entry bilaterally.  No crackles or wheezing.   HEART:  Regular.  No murmur.   GI: Abdomen is soft.  Nontender. No mass. Difficult palpation because of her weight.  EXTREMITIES:  No pedal edema.  No calf swelling or tenderness.   SKIN:  No  rash.     LABORATORY: CBC reviewed.      ASSESSMENT:    1.  An 87-year-old female with multiple myeloma on Revlimid, daratumumab and dexamethasone.  Disease is responding.  2.  Pancytopenia from myeloma and its treatment.       PLAN:    1.  Patient overall is doing well.  She has some fatigue which will go along her age, myeloma and its treatment and anemia.  She has dizziness on and off.  It is not getting worse. She has not been falling down.  She has not been having any recurrent infection or bleeding complications.    2.  Labs done on 01/23/2023 reviewed.  Her monoclonal peak is stable at 0.8.  [place] free light chain has decreased to 55 from 73.  Myeloma is responding to treatment.    She will continue on daratumumab, Revlimid and dexamethasone.  She is tolerating it well.  Side effects reviewed.    3.  When cleared by dentist, Zometa will be resumed.    4.  She has some discomfort in the left ear.  No infection.  She will take over-the-counter Tylenol as needed.    5.  Patient is pancytopenic.  This is from myeloma and its treatment.  She did not have any complication from cytopenia.  Advised her to see a physician if she has fever, infection, bleeding, worsening weakness or any other concerns.    6.  I will see her in 6 to 8 weeks time.  In between she will see our nurse practitioner.     TOTAL VISIT TIME: 40 minutes.  Time spent in today's visit, review of chart/investigations today, monitoring for toxicity of high risk medications and documentation today.      Again, thank you for allowing me to participate in the care of your patient.        Sincerely,        Alex Parry MD

## 2023-02-06 NOTE — PROGRESS NOTES
"Oncology Rooming Note    February 6, 2023 8:44 AM   Quiana Dunaway is a 87 year old female who presents for:    Chief Complaint   Patient presents with     Oncology Clinic Visit     Initial Vitals: /65   Pulse 85   Temp 98.4  F (36.9  C) (Oral)   Resp 16   Wt 58.1 kg (128 lb)   SpO2 97%   BMI 23.49 kg/m   Estimated body mass index is 23.49 kg/m  as calculated from the following:    Height as of 1/23/23: 1.572 m (5' 1.89\").    Weight as of this encounter: 58.1 kg (128 lb). Body surface area is 1.59 meters squared.  No Pain (0) Comment: Data Unavailable   No LMP recorded. Patient is postmenopausal.  Allergies reviewed: Yes  Medications reviewed: Yes    Medications: Medication refills not needed today.  Pharmacy name entered into [email or web address]:    [facility] - [place], [place] - [address]  [id number] - [place], [place] - [address] AT [facility]    Clinical concerns:  doctor was notified.      Mariela Lyn CMA            "

## 2023-02-06 NOTE — PROGRESS NOTES
HEMATOLOGY HISTORY: Mrs. Dunaway is a lady with multiple myeloma (IgG kappa). High risk, t(14;16).  1. On 05/01/2017, WBC of 3.4, hemoglobin of 10.2 and platelet of 154.  2. SPEP on 07/07/2017 revealed M-spike of 1.8.  3. Bone marrow biopsy on 07/12/2017 reveals kappa monotypic plasma cell population consistent with multiple myeloma.  Bone marrow is 70% cellular.  Plasma cell is 50-60%.     -There is gain of chromosome 1, 5, 9, 15, and 17.  There is translocation 14;16.   4.  On 07/18/2017:  -M-spike of 1.9.   -Immunofixation reveals monoclonal IgG kappa.    -IgG level of 2970.  IgA of 15 and IgM of 175.    -Kappa free light chain of 67.7.  Lambda free light chain of 1.42.  Ratio of kappa to lambda of 47.71.    -Beta 2 microglobulin of 3.4.   5. PET scan on 07/24/2017 reveals several focal areas of increased FDG uptake consistent with multiple myeloma.  There is probable epithelial cyst in tail of the pancreas.  No associated abnormal FDG activity.  Left thyroid cysts or nodules without any FDG activity.  There is a 0.3 cm left upper lobe lung nodule.   6.  Velcade, Revlimid and dexamethasone between on 08/14/2017 and 04/30/2018. Velcade stopped.   -Revlimid and dexamethasone continued.  -Revlimid held between 12/27/2021 and 03/08/2022.  -Daratumumab added on 11/14/2022.  7. CT chest, abdomen, and pelvis on 12/30/2021 does not reveal any malignancy.     SUBJECTIVE:  Ms. Dunaway is an 87-year-old female with multiple myeloma on daratumumab, revlimid and dexamethasone. She is toleratibg it well. Zometa is on hold for dental work.    She recently had some viral infection.  She has recovered from it.    She has mild fatigue.  She is able to do activities of daily living.  No headache.  She gets dizziness.  She has vertigo.  No chest pain.  No shortness of breath.  No abdominal pain.  Occasional nausea.  No vomiting.  Appetite is fairly good.  No urinary complaints.  No bowel problem.  No bleeding.  No fever or night  sweats.  She wants me to check her ear.  Sometimes she feels some fullness.  All other review of system negative.      PHYSICAL EXAMINATION:   GENERAL:  Alert and oriented x 3.   VITAL SIGNS:  Reviewed.       EYES:  No icterus.   EARS: Both the ears examined.  Ear canal is normal.  No wax.  No signs of infection.  NECK:  Supple. No lymphadenopathy. No thyromegaly.   AXILLAE:  No lymphadenopathy.   LUNGS:  Good air entry bilaterally.  No crackles or wheezing.   HEART:  Regular.  No murmur.   GI: Abdomen is soft.  Nontender. No mass. Difficult palpation because of her weight.  EXTREMITIES:  No pedal edema.  No calf swelling or tenderness.   SKIN:  No rash.     LABORATORY: CBC reviewed.      ASSESSMENT:    1.  An 87-year-old female with multiple myeloma on Revlimid, daratumumab and dexamethasone.  Disease is responding.  2.  Pancytopenia from myeloma and its treatment.       PLAN:    1.  Patient overall is doing well.  She has some fatigue which will go along her age, myeloma and its treatment and anemia.  She has dizziness on and off.  It is not getting worse. She has not been falling down.  She has not been having any recurrent infection or bleeding complications.    2.  Labs done on 01/23/2023 reviewed.  Her monoclonal peak is stable at 0.8.  [place] free light chain has decreased to 55 from 73.  Myeloma is responding to treatment.    She will continue on daratumumab, Revlimid and dexamethasone.  She is tolerating it well.  Side effects reviewed.    3.  When cleared by dentist, Zometa will be resumed.    4.  She has some discomfort in the left ear.  No infection.  She will take over-the-counter Tylenol as needed.    5.  Patient is pancytopenic.  This is from myeloma and its treatment.  She did not have any complication from cytopenia.  Advised her to see a physician if she has fever, infection, bleeding, worsening weakness or any other concerns.    6.  I will see her in 6 to 8 weeks time.  In between she will see our  nurse practitioner.    ADDENDUM: Her WBC is 3.1.  ANC 0.9.  Neutrophil is 30%.  Monocyte is 23%.  As her monocyte is high, I am okay with her getting the treatment today.  We will give her 1 dose of Neupogen 300 mcg today.     TOTAL VISIT TIME: 40 minutes.  Time spent in today's visit, review of chart/investigations today, monitoring for toxicity of high risk medications and documentation today.

## 2023-02-06 NOTE — PROGRESS NOTES
Infusion Nursing Note:  Quiana Dunaway presents today for C3D15 Darzalex faspro.    Patient seen by provider today: Yes: Dr. Parry   present during visit today: Not Applicable.    Note: Per Dr. Parry, ok to proceed with treatment today. Reviewed ANC 0.9 with Dr. Parry, orders as follows:    Ok to proceed with darzalex faspro and revlimid today  Neupogen x1 today as ordered  Recheck CBC-diff in 1 week    Patient agreeable to plan. Refer to patient instructions for info given to patient on neupogen (from [facility]), reviewed common side effects. Patient will have cbc drawn in 1 week at her appointment with Dr. Chung as previously scheduled. Order entered and updated appointment notes for 2/13. Patient verbalizes understanding of correct dosing/schedule of oral dexamethasone and revlimid as ordered. Patient denies needing refills on dexamethasone. Revlimid delivery scheduled for today, confirmed with pharmacy staff. Zometa is currently on hold due to need for dental work.    Intravenous Access:  No Intravenous access/labs at this visit.    Treatment Conditions:  Lab Results   Component Value Date    HGB 10.5 (L) 02/06/2023    WBC 3.1 (L) 02/06/2023    ANEU 0.3 (LL) 12/10/2022    ANEUTAUTO 0.9 (L) 02/06/2023     (L) 02/06/2023      Results reviewed, labs did NOT meet treatment parameters: see notes above.    Post Infusion Assessment:  Patient tolerated injection without incident.  Site patent and intact, free from redness, edema or discomfort.     Discharge Plan:   Discharge instructions reviewed with: Patient and Family.  Patient and/or family verbalized understanding of discharge instructions and all questions answered.  AVS to patient via [employer].  Patient will return 2/20/23 for next appointment.   Patient discharged in stable condition accompanied by: daughter-in-law.  Departure Mode: Ambulatory.  Nursing face to face time: 25 minutes    Amanda Howe RN

## 2023-02-13 ENCOUNTER — TELEPHONE (OUTPATIENT)
Dept: ONCOLOGY | Facility: CLINIC | Age: 88
End: 2023-02-13

## 2023-02-13 ENCOUNTER — OFFICE VISIT (OUTPATIENT)
Dept: FAMILY MEDICINE | Facility: CLINIC | Age: 88
End: 2023-02-13
Payer: MEDICARE

## 2023-02-13 VITALS
WEIGHT: 127.8 LBS | OXYGEN SATURATION: 98 % | DIASTOLIC BLOOD PRESSURE: 75 MMHG | HEART RATE: 89 BPM | RESPIRATION RATE: 18 BRPM | SYSTOLIC BLOOD PRESSURE: 115 MMHG | TEMPERATURE: 98.4 F | BODY MASS INDEX: 23.46 KG/M2

## 2023-02-13 DIAGNOSIS — C90.00 MULTIPLE MYELOMA NOT HAVING ACHIEVED REMISSION (H): ICD-10-CM

## 2023-02-13 DIAGNOSIS — N18.30 STAGE 3 CHRONIC KIDNEY DISEASE, UNSPECIFIED WHETHER STAGE 3A OR 3B CKD (H): ICD-10-CM

## 2023-02-13 DIAGNOSIS — T45.1X5A CHEMOTHERAPY-INDUCED NEUTROPENIA (H): ICD-10-CM

## 2023-02-13 DIAGNOSIS — D70.1 CHEMOTHERAPY-INDUCED NEUTROPENIA (H): ICD-10-CM

## 2023-02-13 DIAGNOSIS — E05.20 TOXIC MULTINODUL GOITER: Primary | ICD-10-CM

## 2023-02-13 PROBLEM — C79.51 SECONDARY MALIGNANT NEOPLASM OF BONE (H): Status: RESOLVED | Noted: 2022-03-01 | Resolved: 2023-02-13

## 2023-02-13 PROBLEM — I49.5 SICK SINUS SYNDROME (H): Status: RESOLVED | Noted: 2021-01-14 | Resolved: 2023-02-13

## 2023-02-13 LAB
ALBUMIN SERPL BCG-MCNC: 4.1 G/DL (ref 3.5–5.2)
ALP SERPL-CCNC: 70 U/L (ref 35–104)
ALT SERPL W P-5'-P-CCNC: 11 U/L (ref 10–35)
ANION GAP SERPL CALCULATED.3IONS-SCNC: 11 MMOL/L (ref 7–15)
AST SERPL W P-5'-P-CCNC: 12 U/L (ref 10–35)
BASOPHILS # BLD AUTO: 0 10E3/UL (ref 0–0.2)
BASOPHILS NFR BLD AUTO: 0 %
BILIRUB SERPL-MCNC: 0.5 MG/DL
BUN SERPL-MCNC: 20.7 MG/DL (ref 8–23)
CALCIUM SERPL-MCNC: 8.9 MG/DL (ref 8.8–10.2)
CHLORIDE SERPL-SCNC: 106 MMOL/L (ref 98–107)
CREAT SERPL-MCNC: 0.71 MG/DL (ref 0.51–0.95)
DEPRECATED HCO3 PLAS-SCNC: 23 MMOL/L (ref 22–29)
EOSINOPHIL # BLD AUTO: 0 10E3/UL (ref 0–0.7)
EOSINOPHIL NFR BLD AUTO: 1 %
ERYTHROCYTE [DISTWIDTH] IN BLOOD BY AUTOMATED COUNT: 14.9 % (ref 10–15)
GFR SERPL CREATININE-BSD FRML MDRD: 82 ML/MIN/1.73M2
GLUCOSE SERPL-MCNC: 89 MG/DL (ref 70–99)
HCT VFR BLD AUTO: 32.6 % (ref 35–47)
HGB BLD-MCNC: 10.6 G/DL (ref 11.7–15.7)
IMM GRANULOCYTES # BLD: 0.1 10E3/UL
IMM GRANULOCYTES NFR BLD: 1 %
LYMPHOCYTES # BLD AUTO: 1.3 10E3/UL (ref 0.8–5.3)
LYMPHOCYTES NFR BLD AUTO: 36 %
MCH RBC QN AUTO: 30.6 PG (ref 26.5–33)
MCHC RBC AUTO-ENTMCNC: 32.5 G/DL (ref 31.5–36.5)
MCV RBC AUTO: 94 FL (ref 78–100)
MONOCYTES # BLD AUTO: 0.6 10E3/UL (ref 0–1.3)
MONOCYTES NFR BLD AUTO: 17 %
NEUTROPHILS # BLD AUTO: 1.6 10E3/UL (ref 1.6–8.3)
NEUTROPHILS NFR BLD AUTO: 45 %
PLATELET # BLD AUTO: 144 10E3/UL (ref 150–450)
POTASSIUM SERPL-SCNC: 3.9 MMOL/L (ref 3.4–5.3)
PROT SERPL-MCNC: 6.4 G/DL (ref 6.4–8.3)
RBC # BLD AUTO: 3.46 10E6/UL (ref 3.8–5.2)
SODIUM SERPL-SCNC: 140 MMOL/L (ref 136–145)
TSH SERPL DL<=0.005 MIU/L-ACNC: 0.26 UIU/ML (ref 0.3–4.2)
WBC # BLD AUTO: 3.6 10E3/UL (ref 4–11)

## 2023-02-13 PROCEDURE — 36415 COLL VENOUS BLD VENIPUNCTURE: CPT | Performed by: INTERNAL MEDICINE

## 2023-02-13 PROCEDURE — 99213 OFFICE O/P EST LOW 20 MIN: CPT | Performed by: INTERNAL MEDICINE

## 2023-02-13 PROCEDURE — 84443 ASSAY THYROID STIM HORMONE: CPT | Performed by: INTERNAL MEDICINE

## 2023-02-13 PROCEDURE — 85025 COMPLETE CBC W/AUTO DIFF WBC: CPT | Performed by: INTERNAL MEDICINE

## 2023-02-13 PROCEDURE — 80053 COMPREHEN METABOLIC PANEL: CPT | Performed by: INTERNAL MEDICINE

## 2023-02-13 ASSESSMENT — PAIN SCALES - GENERAL: PAINLEVEL: NO PAIN (0)

## 2023-02-13 NOTE — TELEPHONE ENCOUNTER
----- Message from Alex Parry MD sent at 2/6/2023  9:40 AM CST -----  Please, find out if she has completed her dental work-up/check up. Zometa can be resumed after that.    bk

## 2023-02-13 NOTE — TELEPHONE ENCOUNTER
Writer called and LVM requesting a return call to see if Dental work has been completed. Will wait for for a return call.     Alecia Briggs RN

## 2023-02-13 NOTE — PROGRESS NOTES
Assessment & Plan     Toxic multinodul goiter  Check TFTs  - TSH; Future  - TSH    Multiple myeloma not having achieved remission (H)  Check labs   - CBC with platelets differential  - Comprehensive metabolic panel    Chemotherapy-induced neutropenia (H)  Recheck ANC        20 minutes spent on the date of the encounter doing chart review, history and exam, documentation and further activities per the note           Return in about 1 month (around 3/13/2023) for recheck.  Patient instructed to return to clinic or contact us sooner if symptoms worsen or new symptoms develop.     César Chung MD  [facility] MAGALY Araya is a 87 year old accompanied by her daugher in law, presenting for the following health issues:  Follow Up (Patient here for a follow up visit per Dr. Chung.  Would like to get rid of some medications./)      History of Present Illness       Hypertension: She presents for follow up of hypertension.  She does check blood pressure  regularly outside of the clinic. Outpatient blood pressures have not been over 140/90. She follows a low salt diet.           Doing well  No new complaints  Completed home PT  On new drug for myeloma  Blood pressure OK off lisinopril   Daughter in law asked if methimazole is still necessary?    Review of Systems         Objective    /75 (BP Location: Right arm, Patient Position: Sitting, Cuff Size: Adult Regular)   Pulse 89   Temp 98.4  F (36.9  C) (Temporal)   Resp 18   Wt 58 kg (127 lb 12.8 oz)   SpO2 98%   BMI 23.46 kg/m    Body mass index is 23.46 kg/m .  Physical Exam   Well appearing no distress, improved  Heart with regular rate and rhythm.   The lungs are clear to auscultation bilaterally.  No peripheral edema  AOX3, in good spirits

## 2023-02-15 NOTE — RESULT ENCOUNTER NOTE
The following letter pertains to your most recent diagnostic tests:    The thyroid blood test suggest that thyroid hormone levels may be slightly elevated.  However, before changing the dose of the methimazole, I recommend we check again in a 4 to 6-week interval.  We can combine this check with oncology blood work in the future.      Sincerely,    Dr. Chung

## 2023-02-17 RX ORDER — LENALIDOMIDE 10 MG/1
10 CAPSULE ORAL DAILY
Qty: 14 CAPSULE | Refills: 0 | Status: SHIPPED | OUTPATIENT
Start: 2023-02-17 | End: 2023-04-03

## 2023-02-20 ENCOUNTER — ONCOLOGY VISIT (OUTPATIENT)
Dept: ONCOLOGY | Facility: CLINIC | Age: 88
End: 2023-02-20
Attending: INTERNAL MEDICINE
Payer: MEDICARE

## 2023-02-20 ENCOUNTER — TELEPHONE (OUTPATIENT)
Dept: PHARMACY | Facility: CLINIC | Age: 88
End: 2023-02-20

## 2023-02-20 ENCOUNTER — LAB (OUTPATIENT)
Dept: INFUSION THERAPY | Facility: CLINIC | Age: 88
End: 2023-02-20
Attending: INTERNAL MEDICINE
Payer: MEDICARE

## 2023-02-20 ENCOUNTER — TELEPHONE (OUTPATIENT)
Dept: ONCOLOGY | Facility: CLINIC | Age: 88
End: 2023-02-20

## 2023-02-20 VITALS
HEART RATE: 87 BPM | OXYGEN SATURATION: 98 % | WEIGHT: 125.6 LBS | BODY MASS INDEX: 23.05 KG/M2 | RESPIRATION RATE: 16 BRPM | SYSTOLIC BLOOD PRESSURE: 117 MMHG | DIASTOLIC BLOOD PRESSURE: 72 MMHG | TEMPERATURE: 98.3 F

## 2023-02-20 DIAGNOSIS — C90.00 MULTIPLE MYELOMA NOT HAVING ACHIEVED REMISSION (H): Primary | ICD-10-CM

## 2023-02-20 LAB
ALBUMIN SERPL BCG-MCNC: 3.8 G/DL (ref 3.5–5.2)
ALP SERPL-CCNC: 82 U/L (ref 35–104)
ALT SERPL W P-5'-P-CCNC: 8 U/L (ref 10–35)
ANION GAP SERPL CALCULATED.3IONS-SCNC: 10 MMOL/L (ref 7–15)
AST SERPL W P-5'-P-CCNC: 8 U/L (ref 10–35)
BASOPHILS # BLD AUTO: 0 10E3/UL (ref 0–0.2)
BASOPHILS NFR BLD AUTO: 1 %
BILIRUB SERPL-MCNC: 0.5 MG/DL
BUN SERPL-MCNC: 24.4 MG/DL (ref 8–23)
CALCIUM SERPL-MCNC: 8.7 MG/DL (ref 8.8–10.2)
CHLORIDE SERPL-SCNC: 108 MMOL/L (ref 98–107)
CREAT SERPL-MCNC: 0.72 MG/DL (ref 0.51–0.95)
DEPRECATED HCO3 PLAS-SCNC: 27 MMOL/L (ref 22–29)
EOSINOPHIL # BLD AUTO: 0 10E3/UL (ref 0–0.7)
EOSINOPHIL NFR BLD AUTO: 1 %
ERYTHROCYTE [DISTWIDTH] IN BLOOD BY AUTOMATED COUNT: 14.9 % (ref 10–15)
GFR SERPL CREATININE-BSD FRML MDRD: 81 ML/MIN/1.73M2
GLUCOSE SERPL-MCNC: 98 MG/DL (ref 70–99)
HCT VFR BLD AUTO: 29 % (ref 35–47)
HGB BLD-MCNC: 9.6 G/DL (ref 11.7–15.7)
IMM GRANULOCYTES # BLD: 0 10E3/UL
IMM GRANULOCYTES NFR BLD: 0 %
LYMPHOCYTES # BLD AUTO: 1.3 10E3/UL (ref 0.8–5.3)
LYMPHOCYTES NFR BLD AUTO: 30 %
MCH RBC QN AUTO: 31 PG (ref 26.5–33)
MCHC RBC AUTO-ENTMCNC: 33.1 G/DL (ref 31.5–36.5)
MCV RBC AUTO: 94 FL (ref 78–100)
MONOCYTES # BLD AUTO: 0.7 10E3/UL (ref 0–1.3)
MONOCYTES NFR BLD AUTO: 16 %
NEUTROPHILS # BLD AUTO: 2.3 10E3/UL (ref 1.6–8.3)
NEUTROPHILS NFR BLD AUTO: 52 %
NRBC # BLD AUTO: 0 10E3/UL
NRBC BLD AUTO-RTO: 0 /100
PLATELET # BLD AUTO: 123 10E3/UL (ref 150–450)
POTASSIUM SERPL-SCNC: 3.4 MMOL/L (ref 3.4–5.3)
PROT SERPL-MCNC: 6.3 G/DL (ref 6.4–8.3)
RBC # BLD AUTO: 3.1 10E6/UL (ref 3.8–5.2)
SODIUM SERPL-SCNC: 145 MMOL/L (ref 136–145)
TOTAL PROTEIN SERUM FOR ELP: 6 G/DL (ref 6.4–8.3)
WBC # BLD AUTO: 4.3 10E3/UL (ref 4–11)

## 2023-02-20 PROCEDURE — 84155 ASSAY OF PROTEIN SERUM: CPT | Mod: 91 | Performed by: INTERNAL MEDICINE

## 2023-02-20 PROCEDURE — 84165 PROTEIN E-PHORESIS SERUM: CPT | Mod: TC | Performed by: PATHOLOGY

## 2023-02-20 PROCEDURE — 250N000011 HC RX IP 250 OP 636: Performed by: INTERNAL MEDICINE

## 2023-02-20 PROCEDURE — G0463 HOSPITAL OUTPT CLINIC VISIT: HCPCS | Performed by: NURSE PRACTITIONER

## 2023-02-20 PROCEDURE — 99214 OFFICE O/P EST MOD 30 MIN: CPT | Performed by: NURSE PRACTITIONER

## 2023-02-20 PROCEDURE — 85004 AUTOMATED DIFF WBC COUNT: CPT | Performed by: INTERNAL MEDICINE

## 2023-02-20 PROCEDURE — 96401 CHEMO ANTI-NEOPL SQ/IM: CPT

## 2023-02-20 PROCEDURE — 84165 PROTEIN E-PHORESIS SERUM: CPT | Mod: 26

## 2023-02-20 PROCEDURE — 80053 COMPREHEN METABOLIC PANEL: CPT | Performed by: INTERNAL MEDICINE

## 2023-02-20 PROCEDURE — 83521 IG LIGHT CHAINS FREE EACH: CPT | Performed by: INTERNAL MEDICINE

## 2023-02-20 PROCEDURE — 82784 ASSAY IGA/IGD/IGG/IGM EACH: CPT | Performed by: INTERNAL MEDICINE

## 2023-02-20 PROCEDURE — 36415 COLL VENOUS BLD VENIPUNCTURE: CPT

## 2023-02-20 RX ADMIN — DARATUMUMAB AND HYALURONIDASE-FIHJ (HUMAN RECOMBINANT) 1800 MG: 1800; 30000 INJECTION SUBCUTANEOUS at 14:21

## 2023-02-20 ASSESSMENT — PAIN SCALES - GENERAL: PAINLEVEL: NO PAIN (0)

## 2023-02-20 NOTE — TELEPHONE ENCOUNTER
Oral Chemotherapy Monitoring Program    Medication: Revlimid  Rx:  10mg PO daily on days 1-14 of 21 day cycle #14    Auth #: [id number]  Risk Category:Adult female NOT of reproductive capacity    Routine survey questions reviewed.    Thank you,    Sandra Saenz  Oncology/Transplant Float [email or web address]  Phone:[phone]  Fax:[phone]

## 2023-02-20 NOTE — PROGRESS NOTES
"Oncology Rooming Note    February 20, 2023 1:37 PM   Quiana Dunaway is a 87 year old female who presents for:    Chief Complaint   Patient presents with     Oncology Clinic Visit     Initial Vitals: /72   Pulse 87   Temp 98.3  F (36.8  C) (Oral)   Resp 16   Wt 57 kg (125 lb 9.6 oz)   SpO2 98%   BMI 23.05 kg/m   Estimated body mass index is 23.05 kg/m  as calculated from the following:    Height as of 1/23/23: 1.572 m (5' 1.89\").    Weight as of this encounter: 57 kg (125 lb 9.6 oz). Body surface area is 1.58 meters squared.  No Pain (0) Comment: Data Unavailable   No LMP recorded. Patient is postmenopausal.  Allergies reviewed: Yes  Medications reviewed: Yes    Medications: Medication refills not needed today.  Pharmacy name entered into [employer]:    [facility] - [place], [place] - [address]  [id number] - [place], [place] - [address] AT [facility]    Clinical concerns:  NP was notified.      Mariela yLn CMA            "

## 2023-02-20 NOTE — LETTER
"    2/20/2023         RE: Quiana Dunaway  [address] [facility] [address]        Dear Colleague,    Thank you for referring your patient, Quiana Dunaway, to the [facility]. Please see a copy of my visit note below.    Oncology Rooming Note    February 20, 2023 1:37 PM   Quiana Dunaway is a 87 year old female who presents for:    Chief Complaint   Patient presents with     Oncology Clinic Visit     Initial Vitals: /72   Pulse 87   Temp 98.3  F (36.8  C) (Oral)   Resp 16   Wt 57 kg (125 lb 9.6 oz)   SpO2 98%   BMI 23.05 kg/m   Estimated body mass index is 23.05 kg/m  as calculated from the following:    Height as of 1/23/23: 1.572 m (5' 1.89\").    Weight as of this encounter: 57 kg (125 lb 9.6 oz). Body surface area is 1.58 meters squared.  No Pain (0) Comment: Data Unavailable   No LMP recorded. Patient is postmenopausal.  Allergies reviewed: Yes  Medications reviewed: Yes    Medications: Medication refills not needed today.  Pharmacy name entered into [employer]:    [facility] - [place], [place] - [address]  [id number] - [place], [place] - [address] AT [facility]    Clinical concerns:  NP was notified.      Mariela Lyn, [facility]              Oncology/Hematology Visit Note  Feb 20, 2023    Reason for Visit: follow up of multiple myeloma  Patient Dr. Parry  Currently getting treatment with treatment with daratumumab Revlimid and dexamethasone      Interval History:  Patient reports overall feeling well.  Denies fever chills sweats cough shortness of breath chest pain nausea vomiting diarrhea abdominal pain or bleeding.      Review of Systems:  14 point ROS of systems including Constitutional, Eyes, Respiratory, Cardiovascular, Gastroenterology, Genitourinary, Integumentary, Muscularskeletal, Psychiatric were all negative except for pertinent positives noted in my HPI.    Physical " Examination:  General: The patient is a pleasant female in no acute distress.  /72   Pulse 87   Temp 98.3  F (36.8  C) (Oral)   Resp 16   Wt 57 kg (125 lb 9.6 oz)   SpO2 98%   BMI 23.05 kg/m    HEENT: EOMI, PERRL. Sclerae are anicteric. Oral mucosa is pink and moist with no lesions or thrush.   Lymph: Neck is supple with no lymphadenopathy in the cervical or supraclavicular areas.   Heart: Regular rate and rhythm.   Lungs: Clear to auscultation bilaterally.   GI: Bowel sounds present, soft, nontender with no palpable hepatosplenomegaly or masses.   Extremities: No lower extremity edema noted bilaterally.   Skin: No rashes, petechiae, or bruising noted on exposed skin.    LABS  -CBC CMP results reviewed    Assessment and Plan:  This is an 87-year-old female with    Multiple myeloma  Currently on Revlimid daratumumab and dexamethasone  Potential side effects reviewed  Labs reviewed  Continue with treatment  Schedule with Dr. Parry in March with treatment      Prophylaxis  Continue with aspirin and acyclovir    Bone health  Continue to hold Zometa until dental work-up is done      Anemia  Patient denies bleeding  Overall stable    Thrombocytopenia  Patient denies bleeding  Complications of thrombocytopenia reviewed  Call our clinic or go to ER in the event of bleeding fall injury or bruising    Fatigue  Chronic issue  Multifactorial  Continue to monitor  Call our clinic with any changes or worsening of fatigue      Call our clinic with any changes in health condition questions    MARIUSZ Mckeon CNP  [facility]     Chart documentation with Dragon Voice recognition Software. Although reviewed after completion, some words and grammatical errors may remain.            Again, thank you for allowing me to participate in the care of your patient.        Sincerely,        MARIUSZ Mckeon CNP

## 2023-02-20 NOTE — PROGRESS NOTES
Oncology/Hematology Visit Note  Feb 20, 2023    Reason for Visit: follow up of multiple myeloma  Patient Dr. Parry  Currently getting treatment with treatment with daratumumab Revlimid and dexamethasone      Interval History:  Patient reports overall feeling well.  Denies fever chills sweats cough shortness of breath chest pain nausea vomiting diarrhea abdominal pain or bleeding.      Review of Systems:  14 point ROS of systems including Constitutional, Eyes, Respiratory, Cardiovascular, Gastroenterology, Genitourinary, Integumentary, Muscularskeletal, Psychiatric were all negative except for pertinent positives noted in my HPI.    Physical Examination:  General: The patient is a pleasant female in no acute distress.  /72   Pulse 87   Temp 98.3  F (36.8  C) (Oral)   Resp 16   Wt 57 kg (125 lb 9.6 oz)   SpO2 98%   BMI 23.05 kg/m    HEENT: EOMI, PERRL. Sclerae are anicteric. Oral mucosa is pink and moist with no lesions or thrush.   Lymph: Neck is supple with no lymphadenopathy in the cervical or supraclavicular areas.   Heart: Regular rate and rhythm.   Lungs: Clear to auscultation bilaterally.   GI: Bowel sounds present, soft, nontender with no palpable hepatosplenomegaly or masses.   Extremities: No lower extremity edema noted bilaterally.   Skin: No rashes, petechiae, or bruising noted on exposed skin.    LABS  -CBC CMP results reviewed    Assessment and Plan:  This is an 87-year-old female with    Multiple myeloma  Currently on Revlimid daratumumab and dexamethasone  Potential side effects reviewed  Labs reviewed  Continue with treatment  Schedule with Dr. Parry in March with treatment      Prophylaxis  Continue with aspirin and acyclovir    Bone health  Continue to hold Zometa until dental work-up is done      Anemia  Patient denies bleeding  Overall stable    Thrombocytopenia  Patient denies bleeding  Complications of thrombocytopenia reviewed  Call our clinic or go to ER in the event of bleeding  fall injury or bruising    Fatigue  Chronic issue  Multifactorial  Continue to monitor  Call our clinic with any changes or worsening of fatigue      Call our clinic with any changes in health condition questions    MARIUSZ Mckeon CNP [facility]     Chart documentation with Dragon Voice recognition Software. Although reviewed after completion, some words and grammatical errors may remain.

## 2023-02-20 NOTE — PROGRESS NOTES
Infusion Nursing Note:  Quiana Dunaway presents today for C4D1 Darzalex Faspro.    Patient seen by provider today: Yes: Juaquin Hammond NP    present during visit today: Not Applicable.    Note: Patient reports to feeling weak, but overall well. Has no new concerns to report since seeing Juaquin Hammond NP today.    Intravenous Access:  No Intravenous access/labs at this visit.    Treatment Conditions:  Lab Results   Component Value Date    HGB 9.6 (L) 02/20/2023    WBC 4.3 02/20/2023    ANEU 0.3 (LL) 12/10/2022    ANEUTAUTO 2.3 02/20/2023     (L) 02/20/2023      Lab Results   Component Value Date     02/20/2023    POTASSIUM 3.4 02/20/2023    MAG 2.4 (H) 05/26/2020    CR 0.72 02/20/2023    HUMBERTO 8.7 (L) 02/20/2023    BILITOTAL 0.5 02/20/2023    ALBUMIN 3.8 02/20/2023    ALT 8 (L) 02/20/2023    AST 8 (L) 02/20/2023     Results reviewed, labs MET treatment parameters, ok to proceed with treatment.    Post Infusion Assessment:  Patient tolerated one Darzalex Faspro injection to right upper abdomen without incident.     Discharge Plan:   Patient declined prescription refills.  Discharge instructions reviewed with: Patient and Family.  Patient and/or family verbalized understanding of discharge instructions and all questions answered.  AVS to patient via [employer].  Patient will return 3/6/23 for labs and C4D15 Darzalex Faspro for next appointment.   Patient discharged in stable condition accompanied by: family  Departure Mode: Ambulatory with walker.      Michelle Quintana, FAUSTINO

## 2023-02-20 NOTE — ORAL ONC MGMT
Oral Chemotherapy Monitoring Program  Lab Follow Up    Reviewed lab results from 2/20/23.    ORAL CHEMOTHERAPY 9/16/2022 12/5/2022 12/26/2022 1/16/2023 1/23/2023 2/3/2023 2/20/2023   Assessment Type Lab Monitoring Refill Chart Review;Lab Monitoring;Refill Refill Lab Monitoring Refill Lab Monitoring   Diagnosis Code Multiple Myeloma Multiple Myeloma Multiple Myeloma Multiple Myeloma Multiple Myeloma Multiple Myeloma Multiple Myeloma   Providers Sohan Parry   Clinic Name/Location [facility]   Drug Name Revlimid (lenalidomide) Revlimid (lenalidomide) Revlimid (lenalidomide) Revlimid (lenalidomide) Revlimid (lenalidomide) Revlimid (lenalidomide) Revlimid (lenalidomide)   Dose 10 mg 10 mg 10 mg 10 mg 10 mg 10 mg 10 mg   Current Schedule Daily Daily Daily Daily Daily Daily Daily   Cycle Details 2 weeks on, 1 week off 2 weeks on, 1 week off 2 weeks on, 1 week off 2 weeks on, 1 week off 2 weeks on, 1 week off 2 weeks on, 1 week off 2 weeks on, 1 week off   Start Date of Last Cycle - 11/21/2022 12/12/2022 - 1/23/2023 - 2/6/2023   Planned next cycle start date - 12/12/2022 1/2/2023 - 2/13/2023 2/6/2023 2/27/2023   Doses missed in last 2 weeks - - - - - - -   Adherence Assessment - - Adherent - - - -   Adverse Effects - - - - - - No AE identified during assessment   Anemia - - - - - - -   Pharmacist Intervention(anemia) - - - - - - -   Fatigue - - - - - - -   Pharmacist Intervention(fatigue) - - - - - - -   Neutropenia - - - - - - -   Pharmacist Intervention(neutropenia) - - - - - - -   Thrombocytopenia - - - - - - -   Pharmacist Intervention(thrombocytopenia) - - - - - - -   Home BPs - - - - - - -   Any new drug interactions? - - No - - - -   Is the dose as ordered appropriate for the patient? - - Yes - - - -   Is the patient currently in pain? - - - - - - -   Has the patient been assessed within the past 6 months for  depression? - - - - - - -   Has the patient missed any days of school, work, or other routine activity? - - - - - - -   Since the last time we talked, have you been hospitalized or used the emergency room? - - - - - - -       Labs:  _  Result Component Current Result Ref Range   Sodium 145 (2/20/2023) 136 - 145 mmol/L     _  Result Component Current Result Ref Range   Potassium 3.4 (2/20/2023) 3.4 - 5.3 mmol/L     _  Result Component Current Result Ref Range   Calcium 8.7 (L) (2/20/2023) 8.8 - 10.2 mg/dL     No results found for Mag within last 30 days.     No results found for Phos within last 30 days.     _  Result Component Current Result Ref Range   Albumin 3.8 (2/20/2023) 3.5 - 5.2 g/dL     _  Result Component Current Result Ref Range   Urea Nitrogen 24.4 (H) (2/20/2023) 8.0 - 23.0 mg/dL     _  Result Component Current Result Ref Range   Creatinine 0.72 (2/20/2023) 0.51 - 0.95 mg/dL     _  Result Component Current Result Ref Range   AST 8 (L) (2/20/2023) 10 - 35 U/L     _  Result Component Current Result Ref Range   ALT 8 (L) (2/20/2023) 10 - 35 U/L     _  Result Component Current Result Ref Range   Bilirubin Total 0.5 (2/20/2023) <=1.2 mg/dL     _  Result Component Current Result Ref Range   WBC Count 4.3 (2/20/2023) 4.0 - 11.0 10e3/uL     _  Result Component Current Result Ref Range   Hemoglobin 9.6 (L) (2/20/2023) 11.7 - 15.7 g/dL     _  Result Component Current Result Ref Range   Platelet Count 123 (L) (2/20/2023) 150 - 450 10e3/uL     No results found for ANC within last 30 days.     _  Result Component Current Result Ref Range   Absolute Neutrophils 2.3 (2/20/2023) 1.6 - 8.3 10e3/uL        Assessment & Plan:  No concerning abnormalities.    Follow-Up:  3/6: labs    Alfonso Deng, Luke, MS  Hematology/Oncology Clinical Pharmacist  [facility]  [phone]

## 2023-02-21 DIAGNOSIS — R63.4 ABNORMAL LOSS OF WEIGHT: ICD-10-CM

## 2023-02-21 LAB
ALBUMIN SERPL ELPH-MCNC: 3.7 G/DL (ref 3.7–5.1)
ALPHA1 GLOB SERPL ELPH-MCNC: 0.4 G/DL (ref 0.2–0.4)
ALPHA2 GLOB SERPL ELPH-MCNC: 0.6 G/DL (ref 0.5–0.9)
B-GLOBULIN SERPL ELPH-MCNC: 0.5 G/DL (ref 0.6–1)
GAMMA GLOB SERPL ELPH-MCNC: 0.8 G/DL (ref 0.7–1.6)
IGG SERPL-MCNC: 930 MG/DL (ref 610–1616)
KAPPA LC FREE SER-MCNC: 43.55 MG/DL (ref 0.33–1.94)
KAPPA LC FREE/LAMBDA FREE SER NEPH: 124.43 {RATIO} (ref 0.26–1.65)
LAMBDA LC FREE SERPL-MCNC: 0.35 MG/DL (ref 0.57–2.63)
M PROTEIN SERPL ELPH-MCNC: 0.6 G/DL
PROT PATTERN SERPL ELPH-IMP: ABNORMAL

## 2023-02-21 NOTE — TELEPHONE ENCOUNTER
Writer spoke with patient in person after her appts with Juaquin RODRIGUEZ and she does not want any frutehr dental work at this time. She expresses that she needs some teeth extracted but it is not something she desires to have done at this time.    Alecia Briggs RN

## 2023-02-22 RX ORDER — MIRTAZAPINE 7.5 MG/1
15 TABLET, FILM COATED ORAL AT BEDTIME
Qty: 90 TABLET | Refills: 3 | Status: SHIPPED | OUTPATIENT
Start: 2023-02-22 | End: 2023-02-28

## 2023-02-26 ENCOUNTER — MYC MEDICAL ADVICE (OUTPATIENT)
Dept: FAMILY MEDICINE | Facility: CLINIC | Age: 88
End: 2023-02-26
Payer: MEDICARE

## 2023-02-26 DIAGNOSIS — R26.81 GAIT INSTABILITY: Primary | ICD-10-CM

## 2023-02-28 ENCOUNTER — TELEPHONE (OUTPATIENT)
Dept: FAMILY MEDICINE | Facility: CLINIC | Age: 88
End: 2023-02-28
Payer: MEDICARE

## 2023-02-28 DIAGNOSIS — R63.4 ABNORMAL LOSS OF WEIGHT: ICD-10-CM

## 2023-02-28 RX ORDER — MIRTAZAPINE 15 MG/1
15 TABLET, FILM COATED ORAL AT BEDTIME
Qty: 90 TABLET | Refills: 3 | Status: SHIPPED | OUTPATIENT
Start: 2023-02-28 | End: 2024-03-28

## 2023-02-28 NOTE — TELEPHONE ENCOUNTER
Patient's daughter-in-law notified. C@C on file. She and patient's niece, Suzan, set of medications weekly for the patient. Gisselle Quiroz RN

## 2023-02-28 NOTE — TELEPHONE ENCOUNTER
Fax received from [employer] regarding medication coverage for patient's Mirtazapine. Reviewed formulary and medication is covered at 30 tablets per 30 days. Patient's current Rx shows two tablets of 7.5 mg of Mirtazapine daily. Is this still current dosing for patient? Formulary does cover Mirtazapine 15 mg tablets at same amount of 30 tablets per 30 days. Would this be advisable to switch to 15 mg tablets daily instead of two 7.5 mg tablets daily?    Mateo Parker CMA on 2/28/2023 at 11:46 AM

## 2023-03-06 ENCOUNTER — DOCUMENTATION ONLY (OUTPATIENT)
Dept: PHARMACY | Facility: CLINIC | Age: 88
End: 2023-03-06

## 2023-03-06 ENCOUNTER — LAB (OUTPATIENT)
Dept: INFUSION THERAPY | Facility: CLINIC | Age: 88
End: 2023-03-06
Attending: INTERNAL MEDICINE
Payer: MEDICARE

## 2023-03-06 ENCOUNTER — ONCOLOGY VISIT (OUTPATIENT)
Dept: ONCOLOGY | Facility: CLINIC | Age: 88
End: 2023-03-06
Attending: NURSE PRACTITIONER
Payer: MEDICARE

## 2023-03-06 VITALS
OXYGEN SATURATION: 100 % | HEART RATE: 87 BPM | RESPIRATION RATE: 18 BRPM | DIASTOLIC BLOOD PRESSURE: 61 MMHG | TEMPERATURE: 98.4 F | SYSTOLIC BLOOD PRESSURE: 115 MMHG

## 2023-03-06 VITALS
RESPIRATION RATE: 18 BRPM | HEART RATE: 87 BPM | SYSTOLIC BLOOD PRESSURE: 115 MMHG | TEMPERATURE: 98.4 F | DIASTOLIC BLOOD PRESSURE: 61 MMHG | OXYGEN SATURATION: 100 %

## 2023-03-06 DIAGNOSIS — C90.00 MULTIPLE MYELOMA NOT HAVING ACHIEVED REMISSION (H): Primary | ICD-10-CM

## 2023-03-06 DIAGNOSIS — L60.0 INGROWN NAIL: Primary | ICD-10-CM

## 2023-03-06 LAB
BASOPHILS # BLD AUTO: 0 10E3/UL (ref 0–0.2)
BASOPHILS NFR BLD AUTO: 0 %
EOSINOPHIL # BLD AUTO: 0.1 10E3/UL (ref 0–0.7)
EOSINOPHIL NFR BLD AUTO: 4 %
ERYTHROCYTE [DISTWIDTH] IN BLOOD BY AUTOMATED COUNT: 14.1 % (ref 10–15)
HCT VFR BLD AUTO: 30.1 % (ref 35–47)
HGB BLD-MCNC: 10 G/DL (ref 11.7–15.7)
HOLD SPECIMEN: NORMAL
IMM GRANULOCYTES # BLD: 0 10E3/UL
IMM GRANULOCYTES NFR BLD: 0 %
LYMPHOCYTES # BLD AUTO: 1.3 10E3/UL (ref 0.8–5.3)
LYMPHOCYTES NFR BLD AUTO: 44 %
MCH RBC QN AUTO: 31.6 PG (ref 26.5–33)
MCHC RBC AUTO-ENTMCNC: 33.2 G/DL (ref 31.5–36.5)
MCV RBC AUTO: 95 FL (ref 78–100)
MONOCYTES # BLD AUTO: 0.3 10E3/UL (ref 0–1.3)
MONOCYTES NFR BLD AUTO: 10 %
NEUTROPHILS # BLD AUTO: 1.3 10E3/UL (ref 1.6–8.3)
NEUTROPHILS NFR BLD AUTO: 42 %
NRBC # BLD AUTO: 0 10E3/UL
NRBC BLD AUTO-RTO: 0 /100
PLATELET # BLD AUTO: 114 10E3/UL (ref 150–450)
RBC # BLD AUTO: 3.16 10E6/UL (ref 3.8–5.2)
WBC # BLD AUTO: 3.1 10E3/UL (ref 4–11)

## 2023-03-06 PROCEDURE — 85025 COMPLETE CBC W/AUTO DIFF WBC: CPT | Performed by: INTERNAL MEDICINE

## 2023-03-06 PROCEDURE — 96401 CHEMO ANTI-NEOPL SQ/IM: CPT

## 2023-03-06 PROCEDURE — 36415 COLL VENOUS BLD VENIPUNCTURE: CPT | Performed by: INTERNAL MEDICINE

## 2023-03-06 PROCEDURE — G0463 HOSPITAL OUTPT CLINIC VISIT: HCPCS | Mod: 25 | Performed by: NURSE PRACTITIONER

## 2023-03-06 PROCEDURE — 250N000011 HC RX IP 250 OP 636: Performed by: INTERNAL MEDICINE

## 2023-03-06 PROCEDURE — 99214 OFFICE O/P EST MOD 30 MIN: CPT | Performed by: NURSE PRACTITIONER

## 2023-03-06 RX ORDER — CEPHALEXIN 500 MG/1
500 CAPSULE ORAL 3 TIMES DAILY
Qty: 21 CAPSULE | Refills: 0 | Status: SHIPPED | OUTPATIENT
Start: 2023-03-06 | End: 2023-03-13

## 2023-03-06 RX ADMIN — DARATUMUMAB AND HYALURONIDASE-FIHJ (HUMAN RECOMBINANT) 1800 MG: 1800; 30000 INJECTION SUBCUTANEOUS at 13:04

## 2023-03-06 ASSESSMENT — PAIN SCALES - GENERAL: PAINLEVEL: NO PAIN (0)

## 2023-03-06 NOTE — PROGRESS NOTES
Infusion Nursing Note:  Quiana Dunaway presents today for Darzalex Faspro.    Patient seen by provider today: Yes: Juaquin   present during visit today: Not Applicable.    Note: Patient having bruising on bilateral lower extremities along with an ingrown toenail on right great toe, writer requested to have Juaquin see patient, he prescribed antibiotic along with a Podiatry consult.     Intravenous Access:  No Intravenous access/labs at this visit.    Treatment Conditions:  Lab Results   Component Value Date    HGB 10.0 (L) 03/06/2023    WBC 3.1 (L) 03/06/2023    ANEU 0.3 (LL) 12/10/2022    ANEUTAUTO 1.3 (L) 03/06/2023     (L) 03/06/2023      Results reviewed, labs MET treatment parameters, ok to proceed with treatment.    Post Infusion Assessment:  Patient tolerated injection without incident.  Site patent and intact, free from redness, edema or discomfort.  No evidence of extravasations.     Discharge Plan:   Patient declined prescription refills.  Discharge instructions reviewed with: Patient.  Patient and/or family verbalized understanding of discharge instructions and all questions answered.  AVS to patient via [employer].  Patient will return 3/20 for next appointment.   Patient discharged in stable condition accompanied by: self.  Departure Mode: Ambulatory.      Hiram Chu RN

## 2023-03-06 NOTE — PROGRESS NOTES
Oncology/Hematology Visit Note  Mar 6, 2023    Reason for Visit: follow up of multiple myeloma  Patient Dr. Parry  Currently getting treatment with treatment with daratumumab Revlimid and dexamethasone      Interval History:  Patient reports some pain and redness in the right big big toenail.  She denies discharge some tenderness to touch.  Denies fever chills sweats cough shortness of breath chest pain      Review of Systems:  14 point ROS of systems including Constitutional, Eyes, Respiratory, Cardiovascular, Gastroenterology, Genitourinary, Integumentary, Muscularskeletal, Psychiatric were all negative except for pertinent positives noted in my HPI.    Physical Examination:  General: The patient is a pleasant female in no acute distress.  /61   Pulse 87   Temp 98.4  F (36.9  C) (Oral)   Resp 18   SpO2 100%   HEENT: EOMI, PERRL. Sclerae are anicteric. Oral mucosa is pink and moist with no lesions or thrush.   Lymph: Neck is supple with no lymphadenopathy in the cervical or supraclavicular areas.   Heart: Regular rate and rhythm.   Lungs: Clear to auscultation bilaterally.   GI: Bowel sounds present, soft, nontender with no palpable hepatosplenomegaly or masses.   Extremities: No lower extremity edema noted bilaterally.   Skin: No rashes, petechiae, or bruising noted on exposed skin.  Right ingrown nail-redness swelling noted    LABS  -CBC CMP results reviewed    Assessment and Plan:  This is an 87-year-old female with    Multiple myeloma  Currently on Revlimid daratumumab and dexamethasone  Potential side effects reviewed  Labs reviewed  Continue with treatment  Schedule with Dr. Parry in March with treatment      Prophylaxis  Continue with aspirin and acyclovir    Bone health  Continue to hold Zometa until dental work-up is done      Anemia  Patient denies bleeding  Overall stable    Thrombocytopenia  Patient denies bleeding  Complications of thrombocytopenia reviewed  Call our clinic or go to ER in the  event of bleeding fall injury or bruising    Fatigue  Chronic issue  Multifactorial  Continue to monitor  Call our clinic with any changes or worsening of fatigue      right big toe ingrown nail  Start Keflex   Refer to podiatry      Call our clinic with any changes in health condition questions    MARIUSZ Mckeon CNP [facility]     Chart documentation with Dragon Voice recognition Software. Although reviewed after completion, some words and grammatical errors may remain.

## 2023-03-06 NOTE — LETTER
3/6/2023         RE: Quiana Dunaway  [address] [facility] [address]        Dear Colleague,    Thank you for referring your patient, Quiana Dunaway, to the [facility]. Please see a copy of my visit note below.    Oncology/Hematology Visit Note  Mar 6, 2023    Reason for Visit: follow up of multiple myeloma  Patient Dr. Parry  Currently getting treatment with treatment with daratumumab Revlimid and dexamethasone      Interval History:  Patient reports some pain and redness in the right big big toenail.  She denies discharge some tenderness to touch.  Denies fever chills sweats cough shortness of breath chest pain      Review of Systems:  14 point ROS of systems including Constitutional, Eyes, Respiratory, Cardiovascular, Gastroenterology, Genitourinary, Integumentary, Muscularskeletal, Psychiatric were all negative except for pertinent positives noted in my HPI.    Physical Examination:  General: The patient is a pleasant female in no acute distress.  /61   Pulse 87   Temp 98.4  F (36.9  C) (Oral)   Resp 18   SpO2 100%   HEENT: EOMI, PERRL. Sclerae are anicteric. Oral mucosa is pink and moist with no lesions or thrush.   Lymph: Neck is supple with no lymphadenopathy in the cervical or supraclavicular areas.   Heart: Regular rate and rhythm.   Lungs: Clear to auscultation bilaterally.   GI: Bowel sounds present, soft, nontender with no palpable hepatosplenomegaly or masses.   Extremities: No lower extremity edema noted bilaterally.   Skin: No rashes, petechiae, or bruising noted on exposed skin.  Right ingrown nail-redness swelling noted    LABS  -CBC CMP results reviewed    Assessment and Plan:  This is an 87-year-old female with    Multiple myeloma  Currently on Revlimid daratumumab and dexamethasone  Potential side effects reviewed  Labs reviewed  Continue with treatment  Schedule with Dr. Parry in March with treatment      Prophylaxis  Continue with aspirin  and acyclovir    Bone health  Continue to hold Zometa until dental work-up is done      Anemia  Patient denies bleeding  Overall stable    Thrombocytopenia  Patient denies bleeding  Complications of thrombocytopenia reviewed  Call our clinic or go to ER in the event of bleeding fall injury or bruising    Fatigue  Chronic issue  Multifactorial  Continue to monitor  Call our clinic with any changes or worsening of fatigue      right big toe ingrown nail  Start Keflex   Refer to podiatry      Call our clinic with any changes in health condition questions    MARIUSZ Mckeon CNP  [facility]     Chart documentation with Dragon Voice recognition Software. Although reviewed after completion, some words and grammatical errors may remain.            Again, thank you for allowing me to participate in the care of your patient.        Sincerely,        MARIUSZ Mckeon CNP

## 2023-03-06 NOTE — PROGRESS NOTES
Oral Chemotherapy Monitoring Program  Lab Follow Up    Reviewed lab results from 3/6/23.    ORAL CHEMOTHERAPY 12/5/2022 12/26/2022 1/16/2023 1/23/2023 2/3/2023 2/20/2023 3/6/2023   Assessment Type Refill Chart Review;Lab Monitoring;Refill Refill Lab Monitoring Refill Lab Monitoring Lab Monitoring   Diagnosis Code Multiple Myeloma Multiple Myeloma Multiple Myeloma Multiple Myeloma Multiple Myeloma Multiple Myeloma Multiple Myeloma   Providers Dr Sohan Parry   Clinic Name/Location [facility]   Drug Name Revlimid (lenalidomide) Revlimid (lenalidomide) Revlimid (lenalidomide) Revlimid (lenalidomide) Revlimid (lenalidomide) Revlimid (lenalidomide) Revlimid (lenalidomide)   Dose 10 mg 10 mg 10 mg 10 mg 10 mg 10 mg 10 mg   Current Schedule Daily Daily Daily Daily Daily Daily Daily   Cycle Details 2 weeks on, 1 week off 2 weeks on, 1 week off 2 weeks on, 1 week off 2 weeks on, 1 week off 2 weeks on, 1 week off 2 weeks on, 1 week off 2 weeks on, 1 week off   Start Date of Last Cycle 11/21/2022 12/12/2022 - 1/23/2023 - 2/6/2023 2/27/2023   Planned next cycle start date 12/12/2022 1/2/2023 - 2/13/2023 2/6/2023 2/27/2023 3/20/2023   Doses missed in last 2 weeks - - - - - - -   Adherence Assessment - Adherent - - - - -   Adverse Effects - - - - - No AE identified during assessment -   Anemia - - - - - - -   Pharmacist Intervention(anemia) - - - - - - -   Fatigue - - - - - - -   Pharmacist Intervention(fatigue) - - - - - - -   Neutropenia - - - - - - -   Pharmacist Intervention(neutropenia) - - - - - - -   Thrombocytopenia - - - - - - -   Pharmacist Intervention(thrombocytopenia) - - - - - - -   Home BPs - - - - - - -   Any new drug interactions? - No - - - - -   Is the dose as ordered appropriate for the patient? - Yes - - - - -   Is the patient currently in pain? - - - - - - -   Has the patient been assessed within the  past 6 months for depression? - - - - - - -   Has the patient missed any days of school, work, or other routine activity? - - - - - - -   Since the last time we talked, have you been hospitalized or used the emergency room? - - - - - - -       Labs:  _  Result Component Current Result Ref Range   Sodium 145 (2/20/2023) 136 - 145 mmol/L     _  Result Component Current Result Ref Range   Potassium 3.4 (2/20/2023) 3.4 - 5.3 mmol/L     _  Result Component Current Result Ref Range   Calcium 8.7 (L) (2/20/2023) 8.8 - 10.2 mg/dL     No results found for Mag within last 30 days.     No results found for Phos within last 30 days.     _  Result Component Current Result Ref Range   Albumin 3.8 (2/20/2023) 3.5 - 5.2 g/dL     _  Result Component Current Result Ref Range   Urea Nitrogen 24.4 (H) (2/20/2023) 8.0 - 23.0 mg/dL     _  Result Component Current Result Ref Range   Creatinine 0.72 (2/20/2023) 0.51 - 0.95 mg/dL     _  Result Component Current Result Ref Range   AST 8 (L) (2/20/2023) 10 - 35 U/L     _  Result Component Current Result Ref Range   ALT 8 (L) (2/20/2023) 10 - 35 U/L     _  Result Component Current Result Ref Range   Bilirubin Total 0.5 (2/20/2023) <=1.2 mg/dL     _  Result Component Current Result Ref Range   WBC Count 3.1 (L) (3/6/2023) 4.0 - 11.0 10e3/uL     _  Result Component Current Result Ref Range   Hemoglobin 10.0 (L) (3/6/2023) 11.7 - 15.7 g/dL     _  Result Component Current Result Ref Range   Platelet Count 114 (L) (3/6/2023) 150 - 450 10e3/uL     No results found for ANC within last 30 days.     _  Result Component Current Result Ref Range   Absolute Neutrophils 1.3 (L) (3/6/2023) 1.6 - 8.3 10e3/uL        Assessment & Plan:  Results are concerning for grade 2 neutropenia. Ok to proceed with revlimid as planned. Patient will continue with monthly labs per Dr. Parry.    Questions answered to patient's satisfaction.    Follow-Up:  4 weeks with labs.     Debra Ortega PharmD  March 6, 2023

## 2023-03-10 ENCOUNTER — OFFICE VISIT (OUTPATIENT)
Dept: PODIATRY | Facility: CLINIC | Age: 88
End: 2023-03-10
Attending: NURSE PRACTITIONER
Payer: MEDICARE

## 2023-03-10 VITALS — HEIGHT: 62 IN | BODY MASS INDEX: 23 KG/M2 | WEIGHT: 125 LBS | HEART RATE: 88 BPM

## 2023-03-10 DIAGNOSIS — L60.0 INGROWN NAIL: ICD-10-CM

## 2023-03-10 PROCEDURE — 99203 OFFICE O/P NEW LOW 30 MIN: CPT | Performed by: PODIATRIST

## 2023-03-10 NOTE — LETTER
3/10/2023         RE: Quiana Dunaway  [address] [facility] [address]        Dear Colleague,    Thank you for referring your patient, Quiana Dunaway, to the [facility]. Please see a copy of my visit note below.      Assessment:      ICD-10-CM    1. Ingrown nail  L60.0 Orthopedic  Referral             Plan:  No orders of the defined types were placed in this encounter.      Chemo - no procedure    Already on Abx    Foot care nurse referral, see AVS        Return:  No follow-ups on file.    Tara Mathew DPM                Chief Complaint:     Patient presents with:  Right Great Toe - Ingrown Toenail         HPI:  Quiana Dunaway is a 87 year old year old female who presents for evaluation of ingrown toenail.        Past Medical & Surgical History:  Past Medical History:   Diagnosis Date     Anxiety      HTN (hypertension)      Multiple myeloma not having achieved remission (H) 7/18/2017     Pacemaker     Placed due to symptomatic second degree AV Block     Pancytopenia (H) 6/28/2017     Paroxysmal atrial fibrillation (H)       Past Surgical History:   Procedure Laterality Date     ------------OTHER-------------      cataract eye surgery     BONE MARROW BIOPSY, BONE SPECIMEN, NEEDLE/TROCAR N/A 7/12/2017    Procedure: BIOPSY BONE MARROW;  BONE MARROW BIOPSY ;  Surgeon: Grace Vela MD;  Location:  GI     IMPLANT PACEMAKER  03/2017    AV block      Family History   Problem Relation Age of Onset     Unknown/Adopted Mother      Unknown/Adopted Father         Social History:  ?  History   Smoking Status     Never   Smokeless Tobacco     Never     History   Drug Use No     Social History    Substance and Sexual Activity      Alcohol use: No        Alcohol/week: 0.0 standard drinks      Allergies:  ?   Allergies   Allergen Reactions     Levaquin [Levofloxacin] Other (See Comments)     Tingling in feet after 1 dose on 8/7/19          Medications:    Current  "Outpatient Medications   Medication     acyclovir (ZOVIRAX) 400 MG tablet     aspirin 81 MG chewable tablet     BETA BLOCKER NOT PRESCRIBED (INTENTIONAL)     cephALEXin (KEFLEX) 500 MG capsule     cyanocobalamin (VITAMIN B-12) 1000 MCG tablet     Daratumumab (DARZALEX IV)     dexamethasone (DECADRON) 4 MG tablet     famotidine (PEPCID) 40 MG tablet     furosemide (LASIX) 20 MG tablet     Loperamide HCl (IMODIUM A-D PO)     LORazepam (ATIVAN) 0.5 MG tablet     magic mouthwash suspension, diphenhydrAMINE, lidocaine, aluminum-magnesium & simethicone, (FIRST-MOUTHWASH BLM) compounding kit     meclizine (ANTIVERT) 25 MG tablet     methimazole (TAPAZOLE) 5 MG tablet     mirtazapine (REMERON) 15 MG tablet     nitroglycerin (NITROSTAT) 0.4 MG sublingual tablet     ondansetron (ZOFRAN) 8 MG tablet     potassium chloride ER (KLOR-CON M) 10 MEQ CR tablet     prochlorperazine (COMPAZINE) 10 MG tablet     triamcinolone (KENALOG) 0.1 % paste     LENalidomide (REVLIMID) 10 MG CAPS capsule     LENalidomide (REVLIMID) 10 MG CAPS capsule     LENalidomide (REVLIMID) 10 MG CAPS capsule     No current facility-administered medications for this visit.       Physical Exam:  ?  Vitals:  Pulse 88   Ht 1.572 m (5' 1.89\")   Wt 56.7 kg (125 lb)   BMI 22.94 kg/m     General:  WD/WN, in NAD.  A&O x3.  Dermatologic:    Onychocryptosis present.  Paronychia present.  Purulence absent.  Skin texture, turgor is abnormal, atrphic.  Vascular:  Digital capillary refill time normal bilateral.  Skin temperature is normal to affected digit(s).  Neurologic:    Gross sensation normal.  Gait and balance normal.  Musculoskeletal:  aROM MTPJs, anlke intact.  Gross deformity absent.                Again, thank you for allowing me to participate in the care of your patient.        Sincerely,        Tara Mathew DPM    "

## 2023-03-10 NOTE — PATIENT INSTRUCTIONS
PATIENT INSTRUCTIONS - Podiatry / Foot & Ankle Surgery    Continue cephalexin    Topical antiobiotic - neosporin or simialr        Tylenol or ibuprofen for pain                Here is a list of routine foot care resources, which includes toenail trimming and callus/corn management.     This is not a referral. It is your responsibility to contact the organization and your insurance to confirm cost and coverage.      ROUTINE FOOT CARE (NAIL TRIMMING / CALLUSES)      [facility]  [phone]   [facility]  [phone]  Multiple locations   [facility]  [phone] Dr. Lantigua and Dr. Arango  [address] [address] [phone]

## 2023-03-10 NOTE — PROGRESS NOTES
Assessment:      ICD-10-CM    1. Ingrown nail  L60.0 Orthopedic  Referral             Plan:  No orders of the defined types were placed in this encounter.      Chemo - no procedure    Already on Abx    Foot care nurse referral, see AVS        Return:  No follow-ups on file.    Tara Mathew DPM                Chief Complaint:     Patient presents with:  Right Great Toe - Ingrown Toenail         HPI:  Quiana Dunaway is a 87 year old year old female who presents for evaluation of ingrown toenail.        Past Medical & Surgical History:  Past Medical History:   Diagnosis Date     Anxiety      HTN (hypertension)      Multiple myeloma not having achieved remission (H) 7/18/2017     Pacemaker     Placed due to symptomatic second degree AV Block     Pancytopenia (H) 6/28/2017     Paroxysmal atrial fibrillation (H)       Past Surgical History:   Procedure Laterality Date     ------------OTHER-------------      cataract eye surgery     BONE MARROW BIOPSY, BONE SPECIMEN, NEEDLE/TROCAR N/A 7/12/2017    Procedure: BIOPSY BONE MARROW;  BONE MARROW BIOPSY ;  Surgeon: Grace Vela MD;  Location:  GI     IMPLANT PACEMAKER  03/2017    AV block      Family History   Problem Relation Age of Onset     Unknown/Adopted Mother      Unknown/Adopted Father         Social History:  ?  History   Smoking Status     Never   Smokeless Tobacco     Never     History   Drug Use No     Social History    Substance and Sexual Activity      Alcohol use: No        Alcohol/week: 0.0 standard drinks      Allergies:  ?   Allergies   Allergen Reactions     Levaquin [Levofloxacin] Other (See Comments)     Tingling in feet after 1 dose on 8/7/19          Medications:    Current Outpatient Medications   Medication     acyclovir (ZOVIRAX) 400 MG tablet     aspirin 81 MG chewable tablet     BETA BLOCKER NOT PRESCRIBED (INTENTIONAL)     cephALEXin (KEFLEX) 500 MG capsule     cyanocobalamin (VITAMIN B-12) 1000 MCG tablet      "Daratumumab (DARZALEX IV)     dexamethasone (DECADRON) 4 MG tablet     famotidine (PEPCID) 40 MG tablet     furosemide (LASIX) 20 MG tablet     Loperamide HCl (IMODIUM A-D PO)     LORazepam (ATIVAN) 0.5 MG tablet     magic mouthwash suspension, diphenhydrAMINE, lidocaine, aluminum-magnesium & simethicone, (FIRST-MOUTHWASH BLM) compounding kit     meclizine (ANTIVERT) 25 MG tablet     methimazole (TAPAZOLE) 5 MG tablet     mirtazapine (REMERON) 15 MG tablet     nitroglycerin (NITROSTAT) 0.4 MG sublingual tablet     ondansetron (ZOFRAN) 8 MG tablet     potassium chloride ER (KLOR-CON M) 10 MEQ CR tablet     prochlorperazine (COMPAZINE) 10 MG tablet     triamcinolone (KENALOG) 0.1 % paste     LENalidomide (REVLIMID) 10 MG CAPS capsule     LENalidomide (REVLIMID) 10 MG CAPS capsule     LENalidomide (REVLIMID) 10 MG CAPS capsule     No current facility-administered medications for this visit.       Physical Exam:  ?  Vitals:  Pulse 88   Ht 1.572 m (5' 1.89\")   Wt 56.7 kg (125 lb)   BMI 22.94 kg/m     General:  WD/WN, in NAD.  A&O x3.  Dermatologic:    Onychocryptosis present.  Paronychia present.  Purulence absent.  Skin texture, turgor is abnormal, atrphic.  Vascular:  Digital capillary refill time normal bilateral.  Skin temperature is normal to affected digit(s).  Neurologic:    Gross sensation normal.  Gait and balance normal.  Musculoskeletal:  aROM MTPJs, anlke intact.  Gross deformity absent.            "

## 2023-03-13 ENCOUNTER — OFFICE VISIT (OUTPATIENT)
Dept: FAMILY MEDICINE | Facility: CLINIC | Age: 88
End: 2023-03-13
Payer: MEDICARE

## 2023-03-13 VITALS
TEMPERATURE: 97.7 F | HEART RATE: 87 BPM | SYSTOLIC BLOOD PRESSURE: 132 MMHG | OXYGEN SATURATION: 97 % | RESPIRATION RATE: 18 BRPM | DIASTOLIC BLOOD PRESSURE: 67 MMHG

## 2023-03-13 DIAGNOSIS — L60.0 INGROWN NAIL: ICD-10-CM

## 2023-03-13 DIAGNOSIS — E05.20 TOXIC MULTINODUL GOITER: Primary | ICD-10-CM

## 2023-03-13 DIAGNOSIS — C90.00 MULTIPLE MYELOMA NOT HAVING ACHIEVED REMISSION (H): Primary | ICD-10-CM

## 2023-03-13 PROCEDURE — 99213 OFFICE O/P EST LOW 20 MIN: CPT | Performed by: INTERNAL MEDICINE

## 2023-03-13 RX ORDER — LENALIDOMIDE 10 MG/1
10 CAPSULE ORAL DAILY
Qty: 14 CAPSULE | Refills: 0 | Status: SHIPPED | OUTPATIENT
Start: 2023-03-13 | End: 2023-07-10 | Stop reason: ALTCHOICE

## 2023-03-13 ASSESSMENT — PAIN SCALES - GENERAL: PAINLEVEL: NO PAIN (0)

## 2023-03-13 NOTE — PROGRESS NOTES
Assessment & Plan     Toxic multinodul goiter  Recheck TSH at 2 month interval, if persistently suppressed, then increase methimazole   - TSH with free T4 reflex; Future    Ingrown nail  I recommended ongoing warm water soaks and topical antimicrobials  I don't think we should use more systemic antibiotics, I would be worried about worsening diarrhea or colitis   I think with some good local care this should clear up; she will inform me or podiatrist if things worsen         24 minutes spent on the date of the encounter doing chart review, history and exam, documentation and further activities per the note           Return in about 6 weeks (around 4/24/2023) for recheck.  Patient instructed to return to clinic or contact us sooner if symptoms worsen or new symptoms develop.     César Chung MD  [facility] MAGALY Araya is a 87 year old accompanied by her daughter in law, presenting for the following health issues:  Follow Up (Monthly follow with provider.)      History of Present Illness       Reason for visit:  Follow up with provider.    She eats 2-3 servings of fruits and vegetables daily.She consumes 1 sweetened beverage(s) daily.She exercises with enough effort to increase her heart rate 20 to 29 minutes per day.  She exercises with enough effort to increase her heart rate 7 days per week.   She is taking medications regularly.         She has been treated by podiatry for ingrown toenail  She took keflex and is now using topical antibiotic and soaking  Symptoms are improving  TSH was mildly low last check     Review of Systems         Objective    /67 (BP Location: Left arm, Patient Position: Sitting, Cuff Size: Adult Regular)   Pulse 87   Temp 97.7  F (36.5  C) (Temporal)   Resp 18   SpO2 97%   There is no height or weight on file to calculate BMI.  Physical Exam   GEN:   Well appearing, no distress, not toxic  CV:  Heart with regular rate and rhythm.   PULM:  The  lungs are clear to auscultation bilaterally.   Non labored breathing.  EXT:  Thickened toenail noted, mild / minimal erythema adjacent to nail bed without areas of fluctuance   NEURO:  Alert and oriented to person, place and time. Uses walker.

## 2023-03-14 ENCOUNTER — TELEPHONE (OUTPATIENT)
Dept: ONCOLOGY | Facility: CLINIC | Age: 88
End: 2023-03-14

## 2023-03-14 NOTE — TELEPHONE ENCOUNTER
Oral Chemotherapy Monitoring Program    Medication: Revlimid  Rx:  10mg PO daily on days 1 - 14 of 21 day cycle #14    Auth #: [id number]  Risk Category: Adult female NOT of reproductive capacity    Routine survey questions reviewed.    Thank you,    Sandra Saenz  Oncology/Transplant Float [email or web address]  Phone:[phone]  Fax:[phone]

## 2023-03-17 DIAGNOSIS — C90.00 MULTIPLE MYELOMA NOT HAVING ACHIEVED REMISSION (H): Primary | ICD-10-CM

## 2023-03-20 ENCOUNTER — INFUSION THERAPY VISIT (OUTPATIENT)
Dept: INFUSION THERAPY | Facility: CLINIC | Age: 88
End: 2023-03-20
Attending: INTERNAL MEDICINE
Payer: MEDICARE

## 2023-03-20 ENCOUNTER — ONCOLOGY VISIT (OUTPATIENT)
Dept: ONCOLOGY | Facility: CLINIC | Age: 88
End: 2023-03-20
Attending: INTERNAL MEDICINE
Payer: MEDICARE

## 2023-03-20 VITALS
HEIGHT: 61 IN | RESPIRATION RATE: 16 BRPM | WEIGHT: 131.4 LBS | OXYGEN SATURATION: 96 % | SYSTOLIC BLOOD PRESSURE: 107 MMHG | HEART RATE: 79 BPM | DIASTOLIC BLOOD PRESSURE: 60 MMHG | TEMPERATURE: 98.4 F | BODY MASS INDEX: 24.81 KG/M2

## 2023-03-20 DIAGNOSIS — C90.00 MULTIPLE MYELOMA NOT HAVING ACHIEVED REMISSION (H): Primary | ICD-10-CM

## 2023-03-20 DIAGNOSIS — E05.20 TOXIC MULTINODUL GOITER: ICD-10-CM

## 2023-03-20 LAB
ALBUMIN SERPL BCG-MCNC: 3.8 G/DL (ref 3.5–5.2)
ALP SERPL-CCNC: 81 U/L (ref 35–104)
ALT SERPL W P-5'-P-CCNC: 8 U/L (ref 10–35)
ANION GAP SERPL CALCULATED.3IONS-SCNC: 9 MMOL/L (ref 7–15)
AST SERPL W P-5'-P-CCNC: 12 U/L (ref 10–35)
BASOPHILS # BLD MANUAL: 0.1 10E3/UL (ref 0–0.2)
BASOPHILS NFR BLD MANUAL: 2 %
BILIRUB SERPL-MCNC: 0.6 MG/DL
BUN SERPL-MCNC: 20.5 MG/DL (ref 8–23)
CALCIUM SERPL-MCNC: 9.3 MG/DL (ref 8.8–10.2)
CHLORIDE SERPL-SCNC: 107 MMOL/L (ref 98–107)
CREAT SERPL-MCNC: 0.72 MG/DL (ref 0.51–0.95)
DEPRECATED HCO3 PLAS-SCNC: 27 MMOL/L (ref 22–29)
ELLIPTOCYTES BLD QL SMEAR: ABNORMAL
EOSINOPHIL # BLD MANUAL: 0 10E3/UL (ref 0–0.7)
EOSINOPHIL NFR BLD MANUAL: 0 %
ERYTHROCYTE [DISTWIDTH] IN BLOOD BY AUTOMATED COUNT: 13.9 % (ref 10–15)
GFR SERPL CREATININE-BSD FRML MDRD: 81 ML/MIN/1.73M2
GLUCOSE SERPL-MCNC: 107 MG/DL (ref 70–99)
HCT VFR BLD AUTO: 30.5 % (ref 35–47)
HGB BLD-MCNC: 10.3 G/DL (ref 11.7–15.7)
LYMPHOCYTES # BLD MANUAL: 1.4 10E3/UL (ref 0.8–5.3)
LYMPHOCYTES NFR BLD MANUAL: 39 %
MCH RBC QN AUTO: 30.8 PG (ref 26.5–33)
MCHC RBC AUTO-ENTMCNC: 33.8 G/DL (ref 31.5–36.5)
MCV RBC AUTO: 91 FL (ref 78–100)
METAMYELOCYTES # BLD MANUAL: 0 10E3/UL
METAMYELOCYTES NFR BLD MANUAL: 1 %
MONOCYTES # BLD MANUAL: 0.7 10E3/UL (ref 0–1.3)
MONOCYTES NFR BLD MANUAL: 19 %
NEUTROPHILS # BLD MANUAL: 1.4 10E3/UL (ref 1.6–8.3)
NEUTROPHILS NFR BLD MANUAL: 39 %
NRBC # BLD AUTO: 0 10E3/UL
NRBC BLD MANUAL-RTO: 1 %
PLAT MORPH BLD: ABNORMAL
PLATELET # BLD AUTO: 106 10E3/UL (ref 150–450)
POTASSIUM SERPL-SCNC: 3.8 MMOL/L (ref 3.4–5.3)
PROT SERPL-MCNC: 6.3 G/DL (ref 6.4–8.3)
RBC # BLD AUTO: 3.34 10E6/UL (ref 3.8–5.2)
RBC MORPH BLD: ABNORMAL
SMUDGE CELLS BLD QL SMEAR: PRESENT
SODIUM SERPL-SCNC: 143 MMOL/L (ref 136–145)
TOTAL PROTEIN SERUM FOR ELP: 6.1 G/DL (ref 6.4–8.3)
TSH SERPL DL<=0.005 MIU/L-ACNC: 1.54 UIU/ML (ref 0.3–4.2)
WBC # BLD AUTO: 3.5 10E3/UL (ref 4–11)

## 2023-03-20 PROCEDURE — 82784 ASSAY IGA/IGD/IGG/IGM EACH: CPT | Performed by: INTERNAL MEDICINE

## 2023-03-20 PROCEDURE — 250N000011 HC RX IP 250 OP 636: Performed by: PHYSICIAN ASSISTANT

## 2023-03-20 PROCEDURE — 84165 PROTEIN E-PHORESIS SERUM: CPT | Mod: TC | Performed by: PATHOLOGY

## 2023-03-20 PROCEDURE — 36415 COLL VENOUS BLD VENIPUNCTURE: CPT

## 2023-03-20 PROCEDURE — 99214 OFFICE O/P EST MOD 30 MIN: CPT | Performed by: PHYSICIAN ASSISTANT

## 2023-03-20 PROCEDURE — G0463 HOSPITAL OUTPT CLINIC VISIT: HCPCS | Mod: 25 | Performed by: PHYSICIAN ASSISTANT

## 2023-03-20 PROCEDURE — 96401 CHEMO ANTI-NEOPL SQ/IM: CPT

## 2023-03-20 PROCEDURE — 82947 ASSAY GLUCOSE BLOOD QUANT: CPT | Performed by: INTERNAL MEDICINE

## 2023-03-20 PROCEDURE — 83521 IG LIGHT CHAINS FREE EACH: CPT | Performed by: INTERNAL MEDICINE

## 2023-03-20 PROCEDURE — 84155 ASSAY OF PROTEIN SERUM: CPT | Performed by: INTERNAL MEDICINE

## 2023-03-20 PROCEDURE — 84165 PROTEIN E-PHORESIS SERUM: CPT | Mod: 26

## 2023-03-20 PROCEDURE — 85007 BL SMEAR W/DIFF WBC COUNT: CPT | Performed by: INTERNAL MEDICINE

## 2023-03-20 PROCEDURE — 85027 COMPLETE CBC AUTOMATED: CPT | Performed by: INTERNAL MEDICINE

## 2023-03-20 PROCEDURE — 84443 ASSAY THYROID STIM HORMONE: CPT | Performed by: INTERNAL MEDICINE

## 2023-03-20 RX ORDER — LORAZEPAM 2 MG/ML
0.5 INJECTION INTRAMUSCULAR EVERY 4 HOURS PRN
Status: CANCELLED | OUTPATIENT
Start: 2023-03-20

## 2023-03-20 RX ORDER — ACETAMINOPHEN 325 MG/1
650 TABLET ORAL
Status: CANCELLED | OUTPATIENT
Start: 2023-03-20

## 2023-03-20 RX ORDER — HEPARIN SODIUM (PORCINE) LOCK FLUSH IV SOLN 100 UNIT/ML 100 UNIT/ML
5 SOLUTION INTRAVENOUS
Status: CANCELLED | OUTPATIENT
Start: 2023-03-20

## 2023-03-20 RX ORDER — MEPERIDINE HYDROCHLORIDE 25 MG/ML
25 INJECTION INTRAMUSCULAR; INTRAVENOUS; SUBCUTANEOUS EVERY 30 MIN PRN
Status: CANCELLED | OUTPATIENT
Start: 2023-04-03

## 2023-03-20 RX ORDER — MEPERIDINE HYDROCHLORIDE 25 MG/ML
25 INJECTION INTRAMUSCULAR; INTRAVENOUS; SUBCUTANEOUS EVERY 30 MIN PRN
Status: CANCELLED | OUTPATIENT
Start: 2023-03-20

## 2023-03-20 RX ORDER — ALBUTEROL SULFATE 90 UG/1
1-2 AEROSOL, METERED RESPIRATORY (INHALATION)
Status: CANCELLED
Start: 2023-04-03

## 2023-03-20 RX ORDER — METHYLPREDNISOLONE SODIUM SUCCINATE 125 MG/2ML
125 INJECTION, POWDER, LYOPHILIZED, FOR SOLUTION INTRAMUSCULAR; INTRAVENOUS
Status: CANCELLED
Start: 2023-03-20

## 2023-03-20 RX ORDER — DIPHENHYDRAMINE HCL 25 MG
50 CAPSULE ORAL
Status: CANCELLED | OUTPATIENT
Start: 2023-04-03

## 2023-03-20 RX ORDER — DIPHENHYDRAMINE HYDROCHLORIDE 50 MG/ML
50 INJECTION INTRAMUSCULAR; INTRAVENOUS
Status: CANCELLED
Start: 2023-04-03

## 2023-03-20 RX ORDER — DIPHENHYDRAMINE HCL 25 MG
50 CAPSULE ORAL
Status: CANCELLED | OUTPATIENT
Start: 2023-03-20

## 2023-03-20 RX ORDER — HEPARIN SODIUM,PORCINE 10 UNIT/ML
5 VIAL (ML) INTRAVENOUS
Status: CANCELLED | OUTPATIENT
Start: 2023-03-20

## 2023-03-20 RX ORDER — ACETAMINOPHEN 325 MG/1
650 TABLET ORAL
Status: CANCELLED | OUTPATIENT
Start: 2023-04-03

## 2023-03-20 RX ORDER — ALBUTEROL SULFATE 0.83 MG/ML
2.5 SOLUTION RESPIRATORY (INHALATION)
Status: CANCELLED | OUTPATIENT
Start: 2023-03-20

## 2023-03-20 RX ORDER — DIPHENHYDRAMINE HYDROCHLORIDE 50 MG/ML
50 INJECTION INTRAMUSCULAR; INTRAVENOUS
Status: CANCELLED
Start: 2023-03-20

## 2023-03-20 RX ORDER — EPINEPHRINE 1 MG/ML
0.3 INJECTION, SOLUTION INTRAMUSCULAR; SUBCUTANEOUS EVERY 5 MIN PRN
Status: CANCELLED | OUTPATIENT
Start: 2023-03-20

## 2023-03-20 RX ORDER — DEXAMETHASONE 4 MG/1
12 TABLET ORAL
Status: CANCELLED | OUTPATIENT
Start: 2023-04-03

## 2023-03-20 RX ORDER — HEPARIN SODIUM (PORCINE) LOCK FLUSH IV SOLN 100 UNIT/ML 100 UNIT/ML
5 SOLUTION INTRAVENOUS
Status: CANCELLED | OUTPATIENT
Start: 2023-04-03

## 2023-03-20 RX ORDER — ALBUTEROL SULFATE 90 UG/1
1-2 AEROSOL, METERED RESPIRATORY (INHALATION)
Status: CANCELLED
Start: 2023-03-20

## 2023-03-20 RX ORDER — LORAZEPAM 2 MG/ML
0.5 INJECTION INTRAMUSCULAR EVERY 4 HOURS PRN
Status: CANCELLED | OUTPATIENT
Start: 2023-04-03

## 2023-03-20 RX ORDER — METHYLPREDNISOLONE SODIUM SUCCINATE 125 MG/2ML
125 INJECTION, POWDER, LYOPHILIZED, FOR SOLUTION INTRAMUSCULAR; INTRAVENOUS
Status: CANCELLED
Start: 2023-04-03

## 2023-03-20 RX ORDER — DEXAMETHASONE 4 MG/1
12 TABLET ORAL
Status: CANCELLED | OUTPATIENT
Start: 2023-03-20

## 2023-03-20 RX ORDER — EPINEPHRINE 1 MG/ML
0.3 INJECTION, SOLUTION INTRAMUSCULAR; SUBCUTANEOUS EVERY 5 MIN PRN
Status: CANCELLED | OUTPATIENT
Start: 2023-04-03

## 2023-03-20 RX ORDER — ALBUTEROL SULFATE 0.83 MG/ML
2.5 SOLUTION RESPIRATORY (INHALATION)
Status: CANCELLED | OUTPATIENT
Start: 2023-04-03

## 2023-03-20 RX ORDER — HEPARIN SODIUM,PORCINE 10 UNIT/ML
5 VIAL (ML) INTRAVENOUS
Status: CANCELLED | OUTPATIENT
Start: 2023-04-03

## 2023-03-20 RX ADMIN — DARATUMUMAB AND HYALURONIDASE-FIHJ (HUMAN RECOMBINANT) 1800 MG: 1800; 30000 INJECTION SUBCUTANEOUS at 10:19

## 2023-03-20 ASSESSMENT — PAIN SCALES - GENERAL: PAINLEVEL: NO PAIN (0)

## 2023-03-20 NOTE — PROGRESS NOTES
Oncology/Hematology Visit Note  Mar 20, 2023    Reason for Visit: Follow up of multiple myeloma    Primary Oncologist: Dr. Parry  Oncologic History:  Mrs. Dunaway is a lady with multiple myeloma (IgG kappa). High risk, t(14;16).  1. On 05/01/2017, WBC of 3.4, hemoglobin of 10.2 and platelet of 154.  2. SPEP on 07/07/2017 revealed M-spike of 1.8.  3. Bone marrow biopsy on 07/12/2017 reveals kappa monotypic plasma cell population consistent with multiple myeloma.  Bone marrow is 70% cellular.  Plasma cell is 50-60%.     -There is gain of chromosome 1, 5, 9, 15, and 17.  There is translocation 14;16.   4.  On 07/18/2017:  -M-spike of 1.9.   -Immunofixation reveals monoclonal IgG kappa.    -IgG level of 2970.  IgA of 15 and IgM of 175.    -Kappa free light chain of 67.7.  Lambda free light chain of 1.42.  Ratio of kappa to lambda of 47.71.    -Beta 2 microglobulin of 3.4.   5. PET scan on 07/24/2017 reveals several focal areas of increased FDG uptake consistent with multiple myeloma.  There is probable epithelial cyst in tail of the pancreas.  No associated abnormal FDG activity.  Left thyroid cysts or nodules without any FDG activity.  There is a 0.3 cm left upper lobe lung nodule.   6.  Velcade, Revlimid and dexamethasone between on 08/14/2017 and 04/30/2018. Velcade stopped.   -Revlimid and dexamethasone continued.  -Revlimid held between 12/27/2021 and 03/08/2022.  -Daratumumab added on 11/14/2022.  7. CT chest, abdomen, and pelvis on 12/30/2021 does not reveal any malignancy.    Interval History:  Doing okay. Notes a couple week history of drooling, which she attributes to lack on dentition. No change in speech. Still tolerates soft diet. Daughter is with and supports overall stability. The recent ingrown toenail is healing well. Denies fevers or chills. Endorses easy bruising but no bleeding.     Review of Systems:  A complete review of systems was negative except as noted in the HPI.     Past medical, Surgical,  "and social history:  Reviewed    Physical Examination:  /60   Pulse 79   Temp 98.4  F (36.9  C)   Resp 16   Ht 1.549 m (5' 1\")   Wt 59.6 kg (131 lb 6.4 oz)   SpO2 96%   BMI 24.83 kg/m    Wt Readings from Last 10 Encounters:   03/20/23 59.6 kg (131 lb 6.4 oz)   03/10/23 56.7 kg (125 lb)   02/20/23 57 kg (125 lb 9.6 oz)   02/13/23 58 kg (127 lb 12.8 oz)   02/06/23 58.1 kg (128 lb)   01/23/23 58.2 kg (128 lb 3.2 oz)   01/16/23 57.1 kg (125 lb 12.8 oz)   01/12/23 55.8 kg (123 lb)   01/09/23 57 kg (125 lb 9.6 oz)   01/02/23 57.6 kg (127 lb)     General: Elderly patient in no acute distress.   Skin: Warm and dry. No abnormal ecchymosis or rashes.  Eyes: Anicteric.   ENT: Poor dentition. Edentulous to upper jaw, only about 6 lower teeth present and appear poor health no surrounding erythema or infectious signs to the soft tissue.   Heart: Difficult to assess due to winter clothes, however regular on brief auscultation.  Lungs: Clear to auscultation in all fields with no adventitious lung sounds. Normal work of breathing.  Abdomen: Soft, non-tender, non-distended without hepatosplenomegaly. Normoactive bowel sounds.  Extremities: No peripheral edema. Ambulates with some difficulty. Single, 2 inch healing bruise to right shin.   Mental: Calm, cooperative, appropriate. Mood euthymic.  Neuro: Cranial nerves and coordination grossly intact. Alert and oriented x 3.    Laboratory Data:  CBC, CMP reviewed.       Assessment and Plan:  Quiana Dunaway is a 87 year old year old female with high risk (t14;16) kappa multiple myeloma.     # Multiple Myleoma  - Continue Faspro subcutaneous every 2 weeks   - Continue oral Revlimid (10 mg days 1-14 then off 7) and dex (4 mg PO weekly)  - Continue ASA while on Revlimide  - Continue acyclovir  - Easy bruising likely related to low platelets, aspirin, and dex  - Protein studies pending today, but kappa FLC overall downtrending  - Follow-up with Dr. Parry next cycle    # Bone " Health  - Will need Zometa however unable to initiate due to likely ongoing/chronic dental issues and possible infections; patient not interested in completing extractions as it is too stressful on her body to recover    # Diarrhea, Chronic  - Continue Imodium PRN  - Discussed fiber supplement due to diet limited to soft foods    # Insomnia  # Decreased Appetite  # Fatigue  - Continue Remeron    # Drooling  - Likely related to dentition, not currently concerned for active soft tissue infection but known dental caries requiring attention  - Patient to notify us if new symptoms develop to evaluate other causes such as stroke    # Ingrown Toenail, resolved      35 minutes spent on the date of the encounter doing chart review, review of test results, interpretation of tests, patient visit, documentation and discussion with other provider(s)     Vivian Macedo PA-C  Department of Hematology and Oncology  [facility]

## 2023-03-20 NOTE — LETTER
"    3/20/2023         RE: Quiana Dunaway  [address] [facility] [address]        Dear Colleague,    Thank you for referring your patient, Quiana Dunaway, to the [facility] CANCER [facility]. Please see a copy of my visit note below.    Oncology Rooming Note    March 20, 2023 9:25 AM   Quiana Dunaway is a 87 year old female who presents for:    Chief Complaint   Patient presents with     Oncology Clinic Visit     Initial Vitals: There were no vitals taken for this visit. Estimated body mass index is 22.94 kg/m  as calculated from the following:    Height as of 3/10/23: 1.572 m (5' 1.89\").    Weight as of 3/10/23: 56.7 kg (125 lb). There is no height or weight on file to calculate BSA.  Data Unavailable Comment: Data Unavailable   No LMP recorded. Patient is postmenopausal.  Allergies reviewed: Yes  Medications reviewed: Yes    Medications: Medication refills not needed today.  Pharmacy name entered into [facility]:    [facility] - [place], [place] - [address]  [id number] - [place], [place] - [address] AT [facility]    Clinical concerns: very weak PA was notified.      Jacquie Mcdaniel MA              Oncology/Hematology Visit Note  Mar 20, 2023    Reason for Visit: Follow up of multiple myeloma    Primary Oncologist: Dr. Parry  Oncologic History:  Mrs. Dunaway is a lady with multiple myeloma (IgG kappa). High risk, t(14;16).  1. On 05/01/2017, WBC of 3.4, hemoglobin of 10.2 and platelet of 154.  2. SPEP on 07/07/2017 revealed M-spike of 1.8.  3. Bone marrow biopsy on 07/12/2017 reveals kappa monotypic plasma cell population consistent with multiple myeloma.  Bone marrow is 70% cellular.  Plasma cell is 50-60%.     -There is gain of chromosome 1, 5, 9, 15, and 17.  There is translocation 14;16.   4.  On 07/18/2017:  -M-spike of 1.9.   -Immunofixation reveals monoclonal IgG kappa.    -IgG level of 2970.  IgA of 15 and IgM " "of 175.    -Kappa free light chain of 67.7.  Lambda free light chain of 1.42.  Ratio of kappa to lambda of 47.71.    -Beta 2 microglobulin of 3.4.   5. PET scan on 07/24/2017 reveals several focal areas of increased FDG uptake consistent with multiple myeloma.  There is probable epithelial cyst in tail of the pancreas.  No associated abnormal FDG activity.  Left thyroid cysts or nodules without any FDG activity.  There is a 0.3 cm left upper lobe lung nodule.   6.  Velcade, Revlimid and dexamethasone between on 08/14/2017 and 04/30/2018. Velcade stopped.   -Revlimid and dexamethasone continued.  -Revlimid held between 12/27/2021 and 03/08/2022.  -Daratumumab added on 11/14/2022.  7. CT chest, abdomen, and pelvis on 12/30/2021 does not reveal any malignancy.    Interval History:  Doing okay. Notes a couple week history of drooling, which she attributes to lack on dentition. No change in speech. Still tolerates soft diet. Daughter is with and supports overall stability. The recent ingrown toenail is healing well. Denies fevers or chills. Endorses easy bruising but no bleeding.     Review of Systems:  A complete review of systems was negative except as noted in the HPI.     Past medical, Surgical, and social history:  Reviewed    Physical Examination:  /60   Pulse 79   Temp 98.4  F (36.9  C)   Resp 16   Ht 1.549 m (5' 1\")   Wt 59.6 kg (131 lb 6.4 oz)   SpO2 96%   BMI 24.83 kg/m    Wt Readings from Last 10 Encounters:   03/20/23 59.6 kg (131 lb 6.4 oz)   03/10/23 56.7 kg (125 lb)   02/20/23 57 kg (125 lb 9.6 oz)   02/13/23 58 kg (127 lb 12.8 oz)   02/06/23 58.1 kg (128 lb)   01/23/23 58.2 kg (128 lb 3.2 oz)   01/16/23 57.1 kg (125 lb 12.8 oz)   01/12/23 55.8 kg (123 lb)   01/09/23 57 kg (125 lb 9.6 oz)   01/02/23 57.6 kg (127 lb)     General: Elderly patient in no acute distress.   Skin: Warm and dry. No abnormal ecchymosis or rashes.  Eyes: Anicteric.   ENT: Poor dentition. Edentulous to upper jaw, only " about 6 lower teeth present and appear poor health no surrounding erythema or infectious signs to the soft tissue.   Heart: Difficult to assess due to winter clothes, however regular on brief auscultation.  Lungs: Clear to auscultation in all fields with no adventitious lung sounds. Normal work of breathing.  Abdomen: Soft, non-tender, non-distended without hepatosplenomegaly. Normoactive bowel sounds.  Extremities: No peripheral edema. Ambulates with some difficulty. Single, 2 inch healing bruise to right shin.   Mental: Calm, cooperative, appropriate. Mood euthymic.  Neuro: Cranial nerves and coordination grossly intact. Alert and oriented x 3.    Laboratory Data:  CBC, CMP reviewed.       Assessment and Plan:  Quiana Dunaway is a 87 year old year old female with high risk (t14;16) kappa multiple myeloma.     # Multiple Myleoma  - Continue Faspro subcutaneous every 2 weeks   - Continue oral Revlimid (10 mg days 1-14 then off 7) and dex (4 mg PO weekly)  - Continue ASA while on Revlimide  - Continue acyclovir  - Easy bruising likely related to low platelets, aspirin, and dex  - Protein studies pending today, but kappa FLC overall downtrending  - Follow-up with Dr. Parry next cycle    # Bone Health  - Will need Zometa however unable to initiate due to likely ongoing/chronic dental issues and possible infections; patient not interested in completing extractions as it is too stressful on her body to recover    # Diarrhea, Chronic  - Continue Imodium PRN  - Discussed fiber supplement due to diet limited to soft foods    # Insomnia  # Decreased Appetite  # Fatigue  - Continue Remeron    # Drooling  - Likely related to dentition, not currently concerned for active soft tissue infection but known dental caries requiring attention  - Patient to notify us if new symptoms develop to evaluate other causes such as stroke    # Ingrown Toenail, resolved      35 minutes spent on the date of the encounter doing chart review,  review of test results, interpretation of tests, patient visit, documentation and discussion with other provider(s)     Vivian Macedo PA-C  Department of Hematology and Oncology  [facility]       Again, thank you for allowing me to participate in the care of your patient.        Sincerely,        VIVIAN MACEDO PA-C

## 2023-03-20 NOTE — PROGRESS NOTES
"Oncology Rooming Note    March 20, 2023 9:25 AM   Quiana Dunaway is a 87 year old female who presents for:    Chief Complaint   Patient presents with     Oncology Clinic Visit     Initial Vitals: There were no vitals taken for this visit. Estimated body mass index is 22.94 kg/m  as calculated from the following:    Height as of 3/10/23: 1.572 m (5' 1.89\").    Weight as of 3/10/23: 56.7 kg (125 lb). There is no height or weight on file to calculate BSA.  Data Unavailable Comment: Data Unavailable   No LMP recorded. Patient is postmenopausal.  Allergies reviewed: Yes  Medications reviewed: Yes    Medications: Medication refills not needed today.  Pharmacy name entered into [facility]:    [facility] - [place], [place] - [address]  [id number] - [place], [place] - [address] AT [facility]    Clinical concerns: very weak PA was notified.      Jacquie Mcdaniel MA            "

## 2023-03-20 NOTE — PROGRESS NOTES
Infusion Nursing Note:  Quiana Dunaway presents today for darzalex faspro.    Patient seen by provider today: Yes: Vivian   present during visit today: Not Applicable.    Note: N/A.    Intravenous Access:  No Intravenous access/labs at this visit.    Treatment Conditions:  Lab Results   Component Value Date    HGB 10.3 (L) 03/20/2023    WBC 3.5 (L) 03/20/2023    ANEU 1.4 (L) 03/20/2023    ANEUTAUTO 1.3 (L) 03/06/2023     (L) 03/20/2023      Results reviewed, labs MET treatment parameters, ok to proceed with treatment.    Post Infusion Assessment:  Patient tolerated injection without incident.     Discharge Plan:   Discharge instructions reviewed with: Patient and Family.  Patient and/or family verbalized understanding of discharge instructions and all questions answered.  Patient discharged in stable condition accompanied by: self and daughter.  Departure Mode: Ambulatory.      Jessica Shetty RN

## 2023-03-21 LAB
ALBUMIN SERPL ELPH-MCNC: 3.8 G/DL (ref 3.7–5.1)
ALPHA1 GLOB SERPL ELPH-MCNC: 0.3 G/DL (ref 0.2–0.4)
ALPHA2 GLOB SERPL ELPH-MCNC: 0.6 G/DL (ref 0.5–0.9)
B-GLOBULIN SERPL ELPH-MCNC: 0.5 G/DL (ref 0.6–1)
GAMMA GLOB SERPL ELPH-MCNC: 0.9 G/DL (ref 0.7–1.6)
IGG SERPL-MCNC: 943 MG/DL (ref 610–1616)
KAPPA LC FREE SER-MCNC: 53.25 MG/DL (ref 0.33–1.94)
KAPPA LC FREE/LAMBDA FREE SER NEPH: 204.81 {RATIO} (ref 0.26–1.65)
LAMBDA LC FREE SERPL-MCNC: 0.26 MG/DL (ref 0.57–2.63)
M PROTEIN SERPL ELPH-MCNC: 0.7 G/DL
PROT PATTERN SERPL ELPH-IMP: ABNORMAL

## 2023-03-21 NOTE — RESULT ENCOUNTER NOTE
Dr. Juliet Slater is out of the office today and  I have had the opportunity to review your recent results and an interpretation is as follows:  Your thyroid function is stable       Sincerely,  Juarez Abdi MD

## 2023-03-27 ENCOUNTER — TELEPHONE (OUTPATIENT)
Dept: CARDIOLOGY | Facility: CLINIC | Age: 88
End: 2023-03-27
Payer: MEDICARE

## 2023-03-27 NOTE — TELEPHONE ENCOUNTER
Typically we recommend q3 month checks. Patient's last device check was on 11/30 and so technically patient is overdue for a device check. That being said, if patient has no concerns and has been stable it is ok to wait. Spoke with patient's DILJackie, who states patient has been stable and they would like to wait until 4/28 check.  HUNG HARMON

## 2023-03-27 NOTE — TELEPHONE ENCOUNTER
----- Message from Kimberli Morgan RN sent at 3/27/2023 10:11 AM CDT -----  Regarding: FW: Device check    ----- Message -----  From: Natalia Richards  Sent: 3/27/2023   9:42 AM CDT  To: [facility] Heart Ep Nurse  Subject: Device check                                     Pt is scheduled for device check in clinic on 4/28... if she is needing a remote check before that (family is wondering), please contact to arrange     Thanks Natalia

## 2023-03-30 DIAGNOSIS — C90.00 MULTIPLE MYELOMA NOT HAVING ACHIEVED REMISSION (H): ICD-10-CM

## 2023-03-30 RX ORDER — ACYCLOVIR 400 MG/1
400 TABLET ORAL 2 TIMES DAILY
Qty: 60 TABLET | Refills: 5 | Status: CANCELLED | OUTPATIENT
Start: 2023-03-30

## 2023-04-03 ENCOUNTER — ONCOLOGY VISIT (OUTPATIENT)
Dept: ONCOLOGY | Facility: CLINIC | Age: 88
End: 2023-04-03
Attending: INTERNAL MEDICINE
Payer: MEDICARE

## 2023-04-03 ENCOUNTER — LAB (OUTPATIENT)
Dept: INFUSION THERAPY | Facility: CLINIC | Age: 88
End: 2023-04-03
Attending: INTERNAL MEDICINE
Payer: MEDICARE

## 2023-04-03 ENCOUNTER — TELEPHONE (OUTPATIENT)
Dept: RHEUMATOLOGY | Facility: CLINIC | Age: 88
End: 2023-04-03

## 2023-04-03 VITALS
DIASTOLIC BLOOD PRESSURE: 82 MMHG | OXYGEN SATURATION: 98 % | WEIGHT: 127.2 LBS | RESPIRATION RATE: 16 BRPM | SYSTOLIC BLOOD PRESSURE: 130 MMHG | BODY MASS INDEX: 24.03 KG/M2 | TEMPERATURE: 98.1 F | HEART RATE: 84 BPM

## 2023-04-03 DIAGNOSIS — C90.00 MULTIPLE MYELOMA NOT HAVING ACHIEVED REMISSION (H): Primary | ICD-10-CM

## 2023-04-03 LAB
ALBUMIN SERPL BCG-MCNC: 4.3 G/DL (ref 3.5–5.2)
ALP SERPL-CCNC: 84 U/L (ref 35–104)
ALT SERPL W P-5'-P-CCNC: 9 U/L (ref 10–35)
ANION GAP SERPL CALCULATED.3IONS-SCNC: 10 MMOL/L (ref 7–15)
AST SERPL W P-5'-P-CCNC: 11 U/L (ref 10–35)
BASOPHILS # BLD AUTO: 0 10E3/UL (ref 0–0.2)
BASOPHILS NFR BLD AUTO: 1 %
BILIRUB SERPL-MCNC: 0.6 MG/DL
BUN SERPL-MCNC: 22 MG/DL (ref 8–23)
CALCIUM SERPL-MCNC: 8.9 MG/DL (ref 8.8–10.2)
CHLORIDE SERPL-SCNC: 107 MMOL/L (ref 98–107)
CREAT SERPL-MCNC: 0.82 MG/DL (ref 0.51–0.95)
DEPRECATED HCO3 PLAS-SCNC: 24 MMOL/L (ref 22–29)
EOSINOPHIL # BLD AUTO: 0.1 10E3/UL (ref 0–0.7)
EOSINOPHIL NFR BLD AUTO: 1 %
ERYTHROCYTE [DISTWIDTH] IN BLOOD BY AUTOMATED COUNT: 13.7 % (ref 10–15)
GFR SERPL CREATININE-BSD FRML MDRD: 69 ML/MIN/1.73M2
GLUCOSE SERPL-MCNC: 85 MG/DL (ref 70–99)
HCT VFR BLD AUTO: 32.5 % (ref 35–47)
HGB BLD-MCNC: 10.7 G/DL (ref 11.7–15.7)
IMM GRANULOCYTES # BLD: 0 10E3/UL
IMM GRANULOCYTES NFR BLD: 0 %
LYMPHOCYTES # BLD AUTO: 1.4 10E3/UL (ref 0.8–5.3)
LYMPHOCYTES NFR BLD AUTO: 27 %
MCH RBC QN AUTO: 31.3 PG (ref 26.5–33)
MCHC RBC AUTO-ENTMCNC: 32.9 G/DL (ref 31.5–36.5)
MCV RBC AUTO: 95 FL (ref 78–100)
MONOCYTES # BLD AUTO: 1 10E3/UL (ref 0–1.3)
MONOCYTES NFR BLD AUTO: 20 %
NEUTROPHILS # BLD AUTO: 2.5 10E3/UL (ref 1.6–8.3)
NEUTROPHILS NFR BLD AUTO: 51 %
NRBC # BLD AUTO: 0 10E3/UL
NRBC BLD AUTO-RTO: 0 /100
PLATELET # BLD AUTO: 120 10E3/UL (ref 150–450)
POTASSIUM SERPL-SCNC: 3.9 MMOL/L (ref 3.4–5.3)
PROT SERPL-MCNC: 6.8 G/DL (ref 6.4–8.3)
RBC # BLD AUTO: 3.42 10E6/UL (ref 3.8–5.2)
SODIUM SERPL-SCNC: 141 MMOL/L (ref 136–145)
WBC # BLD AUTO: 4.9 10E3/UL (ref 4–11)

## 2023-04-03 PROCEDURE — 36415 COLL VENOUS BLD VENIPUNCTURE: CPT

## 2023-04-03 PROCEDURE — 96401 CHEMO ANTI-NEOPL SQ/IM: CPT

## 2023-04-03 PROCEDURE — 250N000011 HC RX IP 250 OP 636: Performed by: PHYSICIAN ASSISTANT

## 2023-04-03 PROCEDURE — 80053 COMPREHEN METABOLIC PANEL: CPT | Performed by: INTERNAL MEDICINE

## 2023-04-03 PROCEDURE — 99214 OFFICE O/P EST MOD 30 MIN: CPT | Performed by: INTERNAL MEDICINE

## 2023-04-03 PROCEDURE — 85025 COMPLETE CBC W/AUTO DIFF WBC: CPT | Performed by: PHYSICIAN ASSISTANT

## 2023-04-03 PROCEDURE — G0463 HOSPITAL OUTPT CLINIC VISIT: HCPCS | Mod: 25 | Performed by: INTERNAL MEDICINE

## 2023-04-03 RX ORDER — DIPHENHYDRAMINE HYDROCHLORIDE 50 MG/ML
50 INJECTION INTRAMUSCULAR; INTRAVENOUS
Status: CANCELLED
Start: 2023-04-17

## 2023-04-03 RX ORDER — ACYCLOVIR 400 MG/1
400 TABLET ORAL 2 TIMES DAILY
Qty: 180 TABLET | Refills: 3 | Status: SHIPPED | OUTPATIENT
Start: 2023-04-03 | End: 2024-03-05

## 2023-04-03 RX ORDER — HEPARIN SODIUM,PORCINE 10 UNIT/ML
5 VIAL (ML) INTRAVENOUS
Status: CANCELLED | OUTPATIENT
Start: 2023-04-03

## 2023-04-03 RX ORDER — LORAZEPAM 2 MG/ML
0.5 INJECTION INTRAMUSCULAR EVERY 4 HOURS PRN
Status: CANCELLED | OUTPATIENT
Start: 2023-04-17

## 2023-04-03 RX ORDER — ALBUTEROL SULFATE 0.83 MG/ML
2.5 SOLUTION RESPIRATORY (INHALATION)
Status: CANCELLED | OUTPATIENT
Start: 2023-05-01

## 2023-04-03 RX ORDER — METHYLPREDNISOLONE SODIUM SUCCINATE 125 MG/2ML
125 INJECTION, POWDER, LYOPHILIZED, FOR SOLUTION INTRAMUSCULAR; INTRAVENOUS
Status: CANCELLED
Start: 2023-04-17

## 2023-04-03 RX ORDER — LORAZEPAM 2 MG/ML
0.5 INJECTION INTRAMUSCULAR EVERY 4 HOURS PRN
Status: CANCELLED | OUTPATIENT
Start: 2023-05-01

## 2023-04-03 RX ORDER — DIPHENHYDRAMINE HCL 25 MG
50 CAPSULE ORAL
Status: CANCELLED | OUTPATIENT
Start: 2023-05-01

## 2023-04-03 RX ORDER — DIPHENHYDRAMINE HYDROCHLORIDE 50 MG/ML
50 INJECTION INTRAMUSCULAR; INTRAVENOUS
Status: CANCELLED
Start: 2023-04-03

## 2023-04-03 RX ORDER — HEPARIN SODIUM (PORCINE) LOCK FLUSH IV SOLN 100 UNIT/ML 100 UNIT/ML
5 SOLUTION INTRAVENOUS
Status: CANCELLED | OUTPATIENT
Start: 2023-05-01

## 2023-04-03 RX ORDER — MEPERIDINE HYDROCHLORIDE 25 MG/ML
25 INJECTION INTRAMUSCULAR; INTRAVENOUS; SUBCUTANEOUS EVERY 30 MIN PRN
Status: CANCELLED | OUTPATIENT
Start: 2023-04-03

## 2023-04-03 RX ORDER — HEPARIN SODIUM (PORCINE) LOCK FLUSH IV SOLN 100 UNIT/ML 100 UNIT/ML
5 SOLUTION INTRAVENOUS
Status: CANCELLED | OUTPATIENT
Start: 2023-04-03

## 2023-04-03 RX ORDER — ACETAMINOPHEN 325 MG/1
650 TABLET ORAL
Status: CANCELLED | OUTPATIENT
Start: 2023-04-17

## 2023-04-03 RX ORDER — HEPARIN SODIUM,PORCINE 10 UNIT/ML
5 VIAL (ML) INTRAVENOUS
Status: CANCELLED | OUTPATIENT
Start: 2023-05-01

## 2023-04-03 RX ORDER — ALBUTEROL SULFATE 90 UG/1
1-2 AEROSOL, METERED RESPIRATORY (INHALATION)
Status: CANCELLED
Start: 2023-05-01

## 2023-04-03 RX ORDER — EPINEPHRINE 1 MG/ML
0.3 INJECTION, SOLUTION INTRAMUSCULAR; SUBCUTANEOUS EVERY 5 MIN PRN
Status: CANCELLED | OUTPATIENT
Start: 2023-04-03

## 2023-04-03 RX ORDER — EPINEPHRINE 1 MG/ML
0.3 INJECTION, SOLUTION INTRAMUSCULAR; SUBCUTANEOUS EVERY 5 MIN PRN
Status: CANCELLED | OUTPATIENT
Start: 2023-05-01

## 2023-04-03 RX ORDER — EPINEPHRINE 1 MG/ML
0.3 INJECTION, SOLUTION INTRAMUSCULAR; SUBCUTANEOUS EVERY 5 MIN PRN
Status: CANCELLED | OUTPATIENT
Start: 2023-04-17

## 2023-04-03 RX ORDER — METHYLPREDNISOLONE SODIUM SUCCINATE 125 MG/2ML
125 INJECTION, POWDER, LYOPHILIZED, FOR SOLUTION INTRAMUSCULAR; INTRAVENOUS
Status: CANCELLED
Start: 2023-04-03

## 2023-04-03 RX ORDER — DIPHENHYDRAMINE HCL 25 MG
50 CAPSULE ORAL
Status: CANCELLED | OUTPATIENT
Start: 2023-04-17

## 2023-04-03 RX ORDER — ALBUTEROL SULFATE 0.83 MG/ML
2.5 SOLUTION RESPIRATORY (INHALATION)
Status: CANCELLED | OUTPATIENT
Start: 2023-04-03

## 2023-04-03 RX ORDER — ALBUTEROL SULFATE 90 UG/1
1-2 AEROSOL, METERED RESPIRATORY (INHALATION)
Status: CANCELLED
Start: 2023-04-17

## 2023-04-03 RX ORDER — ALBUTEROL SULFATE 90 UG/1
1-2 AEROSOL, METERED RESPIRATORY (INHALATION)
Status: CANCELLED
Start: 2023-04-03

## 2023-04-03 RX ORDER — HEPARIN SODIUM (PORCINE) LOCK FLUSH IV SOLN 100 UNIT/ML 100 UNIT/ML
5 SOLUTION INTRAVENOUS
Status: CANCELLED | OUTPATIENT
Start: 2023-04-17

## 2023-04-03 RX ORDER — MEPERIDINE HYDROCHLORIDE 25 MG/ML
25 INJECTION INTRAMUSCULAR; INTRAVENOUS; SUBCUTANEOUS EVERY 30 MIN PRN
Status: CANCELLED | OUTPATIENT
Start: 2023-05-01

## 2023-04-03 RX ORDER — ZOLEDRONIC ACID 0.04 MG/ML
4 INJECTION, SOLUTION INTRAVENOUS ONCE
Status: CANCELLED | OUTPATIENT
Start: 2023-04-03 | End: 2023-04-03

## 2023-04-03 RX ORDER — DIPHENHYDRAMINE HYDROCHLORIDE 50 MG/ML
50 INJECTION INTRAMUSCULAR; INTRAVENOUS
Status: CANCELLED
Start: 2023-05-01

## 2023-04-03 RX ORDER — LENALIDOMIDE 10 MG/1
10 CAPSULE ORAL DAILY
Qty: 14 CAPSULE | Refills: 0 | Status: SHIPPED | OUTPATIENT
Start: 2023-04-03 | End: 2023-07-10 | Stop reason: ALTCHOICE

## 2023-04-03 RX ORDER — DEXAMETHASONE 4 MG/1
12 TABLET ORAL
Status: CANCELLED | OUTPATIENT
Start: 2023-05-01

## 2023-04-03 RX ORDER — METHYLPREDNISOLONE SODIUM SUCCINATE 125 MG/2ML
125 INJECTION, POWDER, LYOPHILIZED, FOR SOLUTION INTRAMUSCULAR; INTRAVENOUS
Status: CANCELLED
Start: 2023-05-01

## 2023-04-03 RX ORDER — HEPARIN SODIUM,PORCINE 10 UNIT/ML
5 VIAL (ML) INTRAVENOUS
Status: CANCELLED | OUTPATIENT
Start: 2023-04-17

## 2023-04-03 RX ORDER — MEPERIDINE HYDROCHLORIDE 25 MG/ML
25 INJECTION INTRAMUSCULAR; INTRAVENOUS; SUBCUTANEOUS EVERY 30 MIN PRN
Status: CANCELLED | OUTPATIENT
Start: 2023-04-17

## 2023-04-03 RX ORDER — ALBUTEROL SULFATE 0.83 MG/ML
2.5 SOLUTION RESPIRATORY (INHALATION)
Status: CANCELLED | OUTPATIENT
Start: 2023-04-17

## 2023-04-03 RX ORDER — ACETAMINOPHEN 325 MG/1
650 TABLET ORAL
Status: CANCELLED | OUTPATIENT
Start: 2023-05-01

## 2023-04-03 RX ORDER — DEXAMETHASONE 4 MG/1
12 TABLET ORAL
Status: CANCELLED | OUTPATIENT
Start: 2023-04-17

## 2023-04-03 RX ORDER — PROCHLORPERAZINE MALEATE 10 MG
10 TABLET ORAL EVERY 6 HOURS PRN
Qty: 30 TABLET | Refills: 6 | Status: SHIPPED | OUTPATIENT
Start: 2023-04-03 | End: 2023-12-26

## 2023-04-03 RX ADMIN — DARATUMUMAB AND HYALURONIDASE-FIHJ (HUMAN RECOMBINANT) 1800 MG: 1800; 30000 INJECTION SUBCUTANEOUS at 09:16

## 2023-04-03 ASSESSMENT — PAIN SCALES - GENERAL: PAINLEVEL: NO PAIN (0)

## 2023-04-03 NOTE — PROGRESS NOTES
"Oncology Rooming Note    April 3, 2023 8:21 AM   Quiana Dunaway is a 87 year old female who presents for:    Chief Complaint   Patient presents with     Oncology Clinic Visit     Initial Vitals: /82   Pulse 84   Temp 98.1  F (36.7  C) (Oral)   Resp 16   Wt 57.7 kg (127 lb 3.2 oz)   SpO2 98%   BMI 24.03 kg/m   Estimated body mass index is 24.03 kg/m  as calculated from the following:    Height as of 3/20/23: 1.549 m (5' 1\").    Weight as of this encounter: 57.7 kg (127 lb 3.2 oz). Body surface area is 1.58 meters squared.  No Pain (0) Comment: Data Unavailable   No LMP recorded. Patient is postmenopausal.  Allergies reviewed: Yes  Medications reviewed: Yes    Medications: MEDICATION REFILLS NEEDED TODAY. Provider was notified. ACYCLOVIR, COMPAZINE  Pharmacy name entered into [employer]:    [facility] - [place], [place] - [address]  [id number] - [place], [place] - [address] AT [facility]    Clinical concerns: follow up     Florencia Alicia            "

## 2023-04-03 NOTE — PROGRESS NOTES
Infusion Nursing Note:  Quiana Dunaway presents today for Darzalex FasPro.    Patient seen by provider today: Yes: Dr. Parry   present during visit today: Not Applicable.    Note: N/A.    Intravenous Access:  No Intravenous access/labs at this visit.    Treatment Conditions:  Lab Results   Component Value Date    HGB 10.7 (L) 04/03/2023    WBC 4.9 04/03/2023    ANEU 1.4 (L) 03/20/2023    ANEUTAUTO 2.5 04/03/2023     (L) 04/03/2023      Lab Results   Component Value Date     04/03/2023    POTASSIUM 3.9 04/03/2023    MAG 2.4 (H) 05/26/2020    CR 0.82 04/03/2023    HUMBERTO 8.9 04/03/2023    BILITOTAL 0.6 04/03/2023    ALBUMIN 4.3 04/03/2023    ALT 9 (L) 04/03/2023    AST 11 04/03/2023     Results reviewed, labs MET treatment parameters, ok to proceed with treatment.    Post Infusion Assessment:  Patient tolerated injection without incident.  Site patent and intact, free from redness, edema or discomfort.  No evidence of extravasations.     Discharge Plan:   Patient declined prescription refills.  Discharge instructions reviewed with: Patient.  Patient and/or family verbalized understanding of discharge instructions and all questions answered.  AVS to patient via [employer].  Patient will return 4/17 for next appointment.   Patient discharged in stable condition accompanied by: self and daughter.  Departure Mode: Ambulatory.      Hiram Chu RN

## 2023-04-03 NOTE — TELEPHONE ENCOUNTER
Oral Chemotherapy Monitoring Program    Medication: Revlimid  Rx:  10mg PO daily on days 1 - 14 of 21 day cycle #14    Auth #: [id number]  Risk Category: Adult female NOT of reproductive capacity    Routine survey questions reviewed.    Thank you,    Sandra Saenz  Oncology/Transplant Float [email or web address]  Phone:[phone]  Fax:[phone]

## 2023-04-03 NOTE — LETTER
"    4/3/2023         RE: Quiana Dunaway  [address] [facility] [address]        Dear Colleague,    Thank you for referring your patient, Quiana Dunaway, to the [facility] CANCER [facility]. Please see a copy of my visit note below.    Oncology Rooming Note    April 3, 2023 8:21 AM   Quiana Dunaway is a 87 year old female who presents for:    Chief Complaint   Patient presents with     Oncology Clinic Visit     Initial Vitals: /82   Pulse 84   Temp 98.1  F (36.7  C) (Oral)   Resp 16   Wt 57.7 kg (127 lb 3.2 oz)   SpO2 98%   BMI 24.03 kg/m   Estimated body mass index is 24.03 kg/m  as calculated from the following:    Height as of 3/20/23: 1.549 m (5' 1\").    Weight as of this encounter: 57.7 kg (127 lb 3.2 oz). Body surface area is 1.58 meters squared.  No Pain (0) Comment: Data Unavailable   No LMP recorded. Patient is postmenopausal.  Allergies reviewed: Yes  Medications reviewed: Yes    Medications: MEDICATION REFILLS NEEDED TODAY. Provider was notified. ACYCLOVIR, COMPAZINE  Pharmacy name entered into [facility]:    [facility] - [place], [place] - [address]  [id number] - [place], [place] - [address] AT [facility]    Clinical concerns: follow up     Florencia Alicia              HEMATOLOGY HISTORY: Mrs. Dunaway is a lady with multiple myeloma (IgG kappa). High risk, t(14;16).  1. On 05/01/2017, WBC of 3.4, hemoglobin of 10.2 and platelet of 154.  2. SPEP on 07/07/2017 revealed M-spike of 1.8.  3. Bone marrow biopsy on 07/12/2017 reveals kappa monotypic plasma cell population consistent with multiple myeloma.  Bone marrow is 70% cellular.  Plasma cell is 50-60%.     -There is gain of chromosome 1, 5, 9, 15, and 17.  There is translocation 14;16.   4.  On 07/18/2017:  -M-spike of 1.9.   -Immunofixation reveals monoclonal IgG kappa.    -IgG level of 2970.  IgA of 15 and IgM of 175.    -[place] free light chain " of 67.7.  Lambda free light chain of 1.42.  Ratio of kappa to lambda of 47.71.    -Beta 2 microglobulin of 3.4.   5. PET scan on 07/24/2017 reveals several focal areas of increased FDG uptake consistent with multiple myeloma.  There is probable epithelial cyst in tail of the pancreas.  No associated abnormal FDG activity.  Left thyroid cysts or nodules without any FDG activity.  There is a 0.3 cm left upper lobe lung nodule.   6.  Velcade, Revlimid and dexamethasone between on 08/14/2017 and 04/30/2018. Velcade stopped.   -Revlimid and dexamethasone continued.  -Revlimid held between 12/27/2021 and 03/08/2022.  -Daratumumab added on 11/14/2022.  7. CT chest, abdomen, and pelvis on 12/30/2021 does not reveal any malignancy.  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     SUBJECTIVE:  Ms. Dunaway is an 87-year-old female with multiple myeloma on daratumumab, revlimid and dexamethasone. She is toleratibg it well.     Zometa is on hold for dental work.    Patient has been having dizziness.  It is sensation of room spinning around.  Brain MRI in December did not reveal any acute intracranial process.  Patient has been evaluated by ENT.  Previously she tried Antivert which did not help.    Patient has some nausea. It is mainly because of dizziness.  She has some generalized weakness.  Her appetite has been decreased and she has lost some weight.  No headache.  No visual problem.  No chest pain.  No shortness of breath.  No cough.  No recurrent vomiting.  No urinary or bowel complaints.  No bleeding. All other review of system negative.      PHYSICAL EXAMINATION:   GENERAL:  Alert and oriented x 3.   VITAL SIGNS:  Reviewed.       Rest of the systems not examined.     LABORATORY: CBC and CMP reviewed.      ASSESSMENT:    1.  An 87-year-old female with multiple myeloma on Revlimid, daratumumab and dexamethasone.  Disease is stable.  2.  Anemia and thrombocytopenia from myeloma and its  treatment.    3.  Dizziness.  4.  Generalized weakness.     PLAN:    1.  Discussed regarding myeloma.  Disease is stable.  She will continue on current treatment with Revlimid, daratumumab and dexamethasone.  She is tolerating it well.     2.  When cleared by dentist, Zometa will be resumed.    3.  Patient has been having dizziness.  Cause not clear.  She has seen ENT.  She had brain MRI done which does not reveal any acute intracranial process.  If her dizziness gets worse, we will send her to neurologist.    3.  Labs were reviewed with her.  Her WBC and ANC are normal.  Hemoglobin and platelet are low but is stable.  Labs are good for treatment.    4.  Patient has generalized weakness.  Lately her appetite has decreased and she has lost some weight.  This is multifactorial from her age, myeloma and its treatment.    5.  I will see her in 4 to 6 weeks time.  In between she will see our nurse practitioner.  Advised her to call us with any questions or concerns.     4.  She has some discomfort in the left ear.  No infection.  She will take over-the-counter Tylenol as needed.     TOTAL VISIT TIME: 30 minutes.  Time spent in today's visit, review of chart/investigations today, monitoring for toxicity of high risk medications and documentation today.      Again, thank you for allowing me to participate in the care of your patient.        Sincerely,        Alex Parry MD

## 2023-04-04 NOTE — PROGRESS NOTES
HEMATOLOGY HISTORY: Mrs. Dunaway is a lady with multiple myeloma (IgG kappa). High risk, t(14;16).  1. On 05/01/2017, WBC of 3.4, hemoglobin of 10.2 and platelet of 154.  2. SPEP on 07/07/2017 revealed M-spike of 1.8.  3. Bone marrow biopsy on 07/12/2017 reveals kappa monotypic plasma cell population consistent with multiple myeloma.  Bone marrow is 70% cellular.  Plasma cell is 50-60%.     -There is gain of chromosome 1, 5, 9, 15, and 17.  There is translocation 14;16.   4.  On 07/18/2017:  -M-spike of 1.9.   -Immunofixation reveals monoclonal IgG kappa.    -IgG level of 2970.  IgA of 15 and IgM of 175.    -Kappa free light chain of 67.7.  Lambda free light chain of 1.42.  Ratio of kappa to lambda of 47.71.    -Beta 2 microglobulin of 3.4.   5. PET scan on 07/24/2017 reveals several focal areas of increased FDG uptake consistent with multiple myeloma.  There is probable epithelial cyst in tail of the pancreas.  No associated abnormal FDG activity.  Left thyroid cysts or nodules without any FDG activity.  There is a 0.3 cm left upper lobe lung nodule.   6.  Velcade, Revlimid and dexamethasone between on 08/14/2017 and 04/30/2018. Velcade stopped.   -Revlimid and dexamethasone continued.  -Revlimid held between 12/27/2021 and 03/08/2022.  -Daratumumab added on 11/14/2022.  7. CT chest, abdomen, and pelvis on 12/30/2021 does not reveal any malignancy.  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     SUBJECTIVE:  Ms. Dunaway is an 87-year-old female with multiple myeloma on daratumumab, revlimid and dexamethasone. She is toleratibg it well.     Zometa is on hold for dental work.    Patient has been having dizziness.  It is sensation of room spinning around.  Brain MRI in December did not reveal any acute intracranial process.  Patient has been evaluated by ENT.  Previously she tried Antivert which did not help.    Patient has some nausea. It is mainly because of dizziness.  She has some  generalized weakness.  Her appetite has been decreased and she has lost some weight.  No headache.  No visual problem.  No chest pain.  No shortness of breath.  No cough.  No recurrent vomiting.  No urinary or bowel complaints.  No bleeding. All other review of system negative.      PHYSICAL EXAMINATION:   GENERAL:  Alert and oriented x 3.   VITAL SIGNS:  Reviewed.       Rest of the systems not examined.     LABORATORY: CBC and CMP reviewed.      ASSESSMENT:    1.  An 87-year-old female with multiple myeloma on Revlimid, daratumumab and dexamethasone.  Disease is stable.  2.  Anemia and thrombocytopenia from myeloma and its treatment.    3.  Dizziness.  4.  Generalized weakness.     PLAN:    1.  Discussed regarding myeloma.  Disease is stable.  She will continue on current treatment with Revlimid, daratumumab and dexamethasone.  She is tolerating it well.     2.  When cleared by dentist, Zometa will be resumed.    3.  Patient has been having dizziness.  Cause not clear.  She has seen ENT.  She had brain MRI done which does not reveal any acute intracranial process.  If her dizziness gets worse, we will send her to neurologist.    3.  Labs were reviewed with her.  Her WBC and ANC are normal.  Hemoglobin and platelet are low but is stable.  Labs are good for treatment.    4.  Patient has generalized weakness.  Lately her appetite has decreased and she has lost some weight.  This is multifactorial from her age, myeloma and its treatment.    5.  I will see her in 4 to 6 weeks time.  In between she will see our nurse practitioner.  Advised her to call us with any questions or concerns.     4.  She has some discomfort in the left ear.  No infection.  She will take over-the-counter Tylenol as needed.     TOTAL VISIT TIME: 30 minutes.  Time spent in today's visit, review of chart/investigations today, monitoring for toxicity of high risk medications and documentation today.

## 2023-04-17 ENCOUNTER — INFUSION THERAPY VISIT (OUTPATIENT)
Dept: INFUSION THERAPY | Facility: CLINIC | Age: 88
End: 2023-04-17
Attending: INTERNAL MEDICINE
Payer: MEDICARE

## 2023-04-17 ENCOUNTER — ONCOLOGY VISIT (OUTPATIENT)
Dept: ONCOLOGY | Facility: CLINIC | Age: 88
End: 2023-04-17
Attending: INTERNAL MEDICINE
Payer: MEDICARE

## 2023-04-17 ENCOUNTER — MYC MEDICAL ADVICE (OUTPATIENT)
Dept: FAMILY MEDICINE | Facility: CLINIC | Age: 88
End: 2023-04-17

## 2023-04-17 VITALS
OXYGEN SATURATION: 100 % | SYSTOLIC BLOOD PRESSURE: 111 MMHG | RESPIRATION RATE: 16 BRPM | TEMPERATURE: 98.2 F | HEART RATE: 64 BPM | DIASTOLIC BLOOD PRESSURE: 70 MMHG

## 2023-04-17 DIAGNOSIS — C90.00 MULTIPLE MYELOMA NOT HAVING ACHIEVED REMISSION (H): ICD-10-CM

## 2023-04-17 DIAGNOSIS — R26.81 GAIT INSTABILITY: Primary | ICD-10-CM

## 2023-04-17 DIAGNOSIS — R42 DIZZINESS: Primary | ICD-10-CM

## 2023-04-17 DIAGNOSIS — C90.00 MULTIPLE MYELOMA NOT HAVING ACHIEVED REMISSION (H): Primary | ICD-10-CM

## 2023-04-17 DIAGNOSIS — R42 VERTIGO: ICD-10-CM

## 2023-04-17 LAB
ALBUMIN SERPL BCG-MCNC: 4.3 G/DL (ref 3.5–5.2)
ALP SERPL-CCNC: 84 U/L (ref 35–104)
ALT SERPL W P-5'-P-CCNC: 7 U/L (ref 10–35)
ANION GAP SERPL CALCULATED.3IONS-SCNC: 10 MMOL/L (ref 7–15)
AST SERPL W P-5'-P-CCNC: 9 U/L (ref 10–35)
BASOPHILS # BLD AUTO: 0 10E3/UL (ref 0–0.2)
BASOPHILS NFR BLD AUTO: 0 %
BILIRUB SERPL-MCNC: 0.7 MG/DL
BUN SERPL-MCNC: 17.8 MG/DL (ref 8–23)
CALCIUM SERPL-MCNC: 9.2 MG/DL (ref 8.8–10.2)
CHLORIDE SERPL-SCNC: 106 MMOL/L (ref 98–107)
CREAT SERPL-MCNC: 0.66 MG/DL (ref 0.51–0.95)
DEPRECATED HCO3 PLAS-SCNC: 27 MMOL/L (ref 22–29)
EOSINOPHIL # BLD AUTO: 0 10E3/UL (ref 0–0.7)
EOSINOPHIL NFR BLD AUTO: 2 %
ERYTHROCYTE [DISTWIDTH] IN BLOOD BY AUTOMATED COUNT: 13.5 % (ref 10–15)
GFR SERPL CREATININE-BSD FRML MDRD: 85 ML/MIN/1.73M2
GLUCOSE SERPL-MCNC: 113 MG/DL (ref 70–99)
HCT VFR BLD AUTO: 31.5 % (ref 35–47)
HGB BLD-MCNC: 10.6 G/DL (ref 11.7–15.7)
IMM GRANULOCYTES # BLD: 0 10E3/UL
IMM GRANULOCYTES NFR BLD: 0 %
LYMPHOCYTES # BLD AUTO: 1 10E3/UL (ref 0.8–5.3)
LYMPHOCYTES NFR BLD AUTO: 38 %
MCH RBC QN AUTO: 31.4 PG (ref 26.5–33)
MCHC RBC AUTO-ENTMCNC: 33.7 G/DL (ref 31.5–36.5)
MCV RBC AUTO: 93 FL (ref 78–100)
MONOCYTES # BLD AUTO: 0.3 10E3/UL (ref 0–1.3)
MONOCYTES NFR BLD AUTO: 10 %
NEUTROPHILS # BLD AUTO: 1.3 10E3/UL (ref 1.6–8.3)
NEUTROPHILS NFR BLD AUTO: 50 %
NRBC # BLD AUTO: 0 10E3/UL
NRBC BLD AUTO-RTO: 0 /100
PLATELET # BLD AUTO: 162 10E3/UL (ref 150–450)
POTASSIUM SERPL-SCNC: 3.8 MMOL/L (ref 3.4–5.3)
PROT SERPL-MCNC: 6.5 G/DL (ref 6.4–8.3)
RBC # BLD AUTO: 3.38 10E6/UL (ref 3.8–5.2)
SODIUM SERPL-SCNC: 143 MMOL/L (ref 136–145)
TOTAL PROTEIN SERUM FOR ELP: 6.3 G/DL (ref 6.4–8.3)
WBC # BLD AUTO: 2.7 10E3/UL (ref 4–11)

## 2023-04-17 PROCEDURE — 80053 COMPREHEN METABOLIC PANEL: CPT | Performed by: INTERNAL MEDICINE

## 2023-04-17 PROCEDURE — 84165 PROTEIN E-PHORESIS SERUM: CPT | Mod: TC | Performed by: STUDENT IN AN ORGANIZED HEALTH CARE EDUCATION/TRAINING PROGRAM

## 2023-04-17 PROCEDURE — 82784 ASSAY IGA/IGD/IGG/IGM EACH: CPT | Performed by: INTERNAL MEDICINE

## 2023-04-17 PROCEDURE — 84165 PROTEIN E-PHORESIS SERUM: CPT | Mod: 26

## 2023-04-17 PROCEDURE — G0463 HOSPITAL OUTPT CLINIC VISIT: HCPCS | Mod: 25 | Performed by: NURSE PRACTITIONER

## 2023-04-17 PROCEDURE — 99214 OFFICE O/P EST MOD 30 MIN: CPT | Performed by: NURSE PRACTITIONER

## 2023-04-17 PROCEDURE — 84155 ASSAY OF PROTEIN SERUM: CPT | Mod: 91 | Performed by: INTERNAL MEDICINE

## 2023-04-17 PROCEDURE — 250N000011 HC RX IP 250 OP 636: Performed by: INTERNAL MEDICINE

## 2023-04-17 PROCEDURE — 85014 HEMATOCRIT: CPT | Performed by: INTERNAL MEDICINE

## 2023-04-17 PROCEDURE — 83521 IG LIGHT CHAINS FREE EACH: CPT | Mod: 59 | Performed by: INTERNAL MEDICINE

## 2023-04-17 PROCEDURE — 96401 CHEMO ANTI-NEOPL SQ/IM: CPT

## 2023-04-17 PROCEDURE — 36415 COLL VENOUS BLD VENIPUNCTURE: CPT | Performed by: INTERNAL MEDICINE

## 2023-04-17 RX ORDER — MECLIZINE HYDROCHLORIDE 25 MG/1
25 TABLET ORAL 3 TIMES DAILY PRN
Qty: 30 TABLET | Refills: 0 | Status: SHIPPED | OUTPATIENT
Start: 2023-04-17 | End: 2023-05-10

## 2023-04-17 RX ADMIN — DARATUMUMAB AND HYALURONIDASE-FIHJ (HUMAN RECOMBINANT) 1800 MG: 1800; 30000 INJECTION SUBCUTANEOUS at 10:52

## 2023-04-17 ASSESSMENT — PAIN SCALES - GENERAL: PAINLEVEL: NO PAIN (0)

## 2023-04-17 NOTE — PROGRESS NOTES
Nursing Note:  Quiana Dunaway presents today for labs only.    Patient seen by provider today: Yes: Juaquin Hammond NP   present during visit today: Not Applicable.    Note: N/A.    Intravenous Access:  Lab draw site LAC, Needle type butterfly, Gauge 23.    Discharge Plan:   Patient was sent to [facility] for 930 appointment.    Isabel Bethea RN

## 2023-04-17 NOTE — PROGRESS NOTES
Infusion Nursing Note:  Quiana Dunaway presents today for Darzalex.    Patient seen by provider today: Yes: Juaquin Hammond   present during visit today: Not Applicable.    Note: N/A.      Intravenous Access:  No Intravenous access/labs at this visit.    Treatment Conditions:  Lab Results   Component Value Date    HGB 10.6 (L) 04/17/2023    WBC 2.7 (L) 04/17/2023    ANEU 1.4 (L) 03/20/2023    ANEUTAUTO 1.3 (L) 04/17/2023     04/17/2023      Lab Results   Component Value Date     04/17/2023    POTASSIUM 3.8 04/17/2023    MAG 2.4 (H) 05/26/2020    CR 0.66 04/17/2023    HUMBERTO 9.2 04/17/2023    BILITOTAL 0.7 04/17/2023    ALBUMIN 4.3 04/17/2023    ALT 7 (L) 04/17/2023    AST 9 (L) 04/17/2023     Results reviewed, labs MET treatment parameters, ok to proceed with treatment.      Post Infusion Assessment:  Patient tolerated injection without incident.  Site patent and intact, free from redness, edema or discomfort.  No evidence of extravasations.       Discharge Plan:   [facility]  Patient will return 5/1 for next appointment.   Patient discharged in stable condition accompanied by: daughter.  Departure Mode: Wheelchair.      Tonja Lopez RN

## 2023-04-17 NOTE — LETTER
4/17/2023         RE: Quiana Dunaway  [address] [facility] [address]        Dear Colleague,    Thank you for referring your patient, Quiana Dunaway, to the [facility]. Please see a copy of my visit note below.    Oncology/Hematology Visit Note  Apr 17, 2023    Reason for Visit: follow up of multiple myeloma  Patient Dr. Parry  Currently getting treatment with treatment with daratumumab Revlimid and dexamethasone      Interval History:  Patient reports that she continues to have intermittent dizziness for months now.  She denies headache blurred vision seizure denies nausea , denies heart palpitations ,denies fever chills sweats cough shortness of breath chest pain    Review of Systems:  14 point ROS of systems including Constitutional, Eyes, Respiratory, Cardiovascular, Gastroenterology, Genitourinary, Integumentary, Muscularskeletal, Psychiatric were all negative except for pertinent positives noted in my HPI.    Physical Examination:  General: The patient is a pleasant female in no acute distress.  /70   Pulse 64   Temp 98.2  F (36.8  C) (Oral)   Resp 16   SpO2 100%   HEENT: EOMI, PERRL. Sclerae are anicteric. Oral mucosa is pink and moist with no lesions or thrush.   Lymph: Neck is supple with no lymphadenopathy in the cervical or supraclavicular areas.   Heart: Regular rate and rhythm.   Lungs: Clear to auscultation bilaterally.   GI: Bowel sounds present, soft, nontender with no palpable hepatosplenomegaly or masses.   Extremities: No lower extremity edema noted bilaterally.   Skin: No rashes, petechiae, or bruising noted on exposed skin.  Right ingrown nail-redness swelling noted    LABS  -CBC CMP results reviewed    Assessment and Plan:  This is an 87-year-old female with    Multiple myeloma  Currently on Revlimid daratumumab and dexamethasone  Potential side effects reviewed  Labs reviewed  Continue with treatment        Prophylaxis  Continue with  aspirin and acyclovir    Bone health  Continue to hold Zometa until dental work-up is done      Anemia  Patient denies bleeding  Overall stable    Thrombocytopenia  Patient denies bleeding  Complications of thrombocytopenia reviewed  Call our clinic or go to ER in the event of bleeding fall injury or bruising    Neutropenia secondary to Revlimid  ANC 1.3  Okay to continue with Revlimid today second week of Revlimid of this cycle   -Neutropenic precautions check temperature frequently in the event of fever chills sweats go to ER      Fatigue  Chronic issue  Multifactorial  Continue to monitor  Call our clinic with any changes or worsening of fatigue      Dizziness  Chronic issue.  Intermittent dizziness  12/2022-MRI of the brain did not reveal acute findings  Patient also seen by ENT  -We will refer patient to neurology      Call our clinic with any changes in health condition questions    MARIUSZ Mckeon CNP  [facility]     Chart documentation with Dragon Voice recognition Software. Although reviewed after completion, some words and grammatical errors may remain.            Again, thank you for allowing me to participate in the care of your patient.        Sincerely,        MARIUSZ Mckeon CNP

## 2023-04-17 NOTE — PROGRESS NOTES
Oncology/Hematology Visit Note  Apr 17, 2023    Reason for Visit: follow up of multiple myeloma  Patient Dr. Parry  Currently getting treatment with treatment with daratumumab Revlimid and dexamethasone      Interval History:  Patient reports that she continues to have intermittent dizziness for months now.  She denies headache blurred vision seizure denies nausea , denies heart palpitations ,denies fever chills sweats cough shortness of breath chest pain    Review of Systems:  14 point ROS of systems including Constitutional, Eyes, Respiratory, Cardiovascular, Gastroenterology, Genitourinary, Integumentary, Muscularskeletal, Psychiatric were all negative except for pertinent positives noted in my HPI.    Physical Examination:  General: The patient is a pleasant female in no acute distress.  /70   Pulse 64   Temp 98.2  F (36.8  C) (Oral)   Resp 16   SpO2 100%   HEENT: EOMI, PERRL. Sclerae are anicteric. Oral mucosa is pink and moist with no lesions or thrush.   Lymph: Neck is supple with no lymphadenopathy in the cervical or supraclavicular areas.   Heart: Regular rate and rhythm.   Lungs: Clear to auscultation bilaterally.   GI: Bowel sounds present, soft, nontender with no palpable hepatosplenomegaly or masses.   Extremities: No lower extremity edema noted bilaterally.   Skin: No rashes, petechiae, or bruising noted on exposed skin.  Right ingrown nail-redness swelling noted    LABS  -CBC CMP results reviewed    Assessment and Plan:  This is an 87-year-old female with    Multiple myeloma  Currently on Revlimid daratumumab and dexamethasone  Potential side effects reviewed  Labs reviewed  Continue with treatment        Prophylaxis  Continue with aspirin and acyclovir    Bone health  Continue to hold Zometa until dental work-up is done      Anemia  Patient denies bleeding  Overall stable    Thrombocytopenia  Patient denies bleeding  Complications of thrombocytopenia reviewed  Call our clinic or go to ER in  the event of bleeding fall injury or bruising    Neutropenia secondary to Revlimid  ANC 1.3  Okay to continue with Revlimid today second week of Revlimid of this cycle   -Neutropenic precautions check temperature frequently in the event of fever chills sweats go to ER      Fatigue  Chronic issue  Multifactorial  Continue to monitor  Call our clinic with any changes or worsening of fatigue      Dizziness  Chronic issue.  Intermittent dizziness  12/2022-MRI of the brain did not reveal acute findings  Patient also seen by ENT  -We will refer patient to neurology      Call our clinic with any changes in health condition questions    MARIUSZ Mckeon CNP  [facility]     Chart documentation with Dragon Voice recognition Software. Although reviewed after completion, some words and grammatical errors may remain.

## 2023-04-18 LAB
IGG SERPL-MCNC: 905 MG/DL (ref 610–1616)
KAPPA LC FREE SER-MCNC: 38.1 MG/DL (ref 0.33–1.94)
KAPPA LC FREE/LAMBDA FREE SER NEPH: 146.54 {RATIO} (ref 0.26–1.65)
LAMBDA LC FREE SERPL-MCNC: 0.26 MG/DL (ref 0.57–2.63)

## 2023-04-19 ENCOUNTER — TELEPHONE (OUTPATIENT)
Dept: ONCOLOGY | Facility: CLINIC | Age: 88
End: 2023-04-19
Payer: MEDICARE

## 2023-04-19 ENCOUNTER — TELEPHONE (OUTPATIENT)
Dept: FAMILY MEDICINE | Facility: CLINIC | Age: 88
End: 2023-04-19
Payer: MEDICARE

## 2023-04-19 LAB
ALBUMIN SERPL ELPH-MCNC: 3.9 G/DL (ref 3.7–5.1)
ALPHA1 GLOB SERPL ELPH-MCNC: 0.3 G/DL (ref 0.2–0.4)
ALPHA2 GLOB SERPL ELPH-MCNC: 0.6 G/DL (ref 0.5–0.9)
B-GLOBULIN SERPL ELPH-MCNC: 0.6 G/DL (ref 0.6–1)
GAMMA GLOB SERPL ELPH-MCNC: 0.8 G/DL (ref 0.7–1.6)
M PROTEIN SERPL ELPH-MCNC: 0.6 G/DL
PROT PATTERN SERPL ELPH-IMP: ABNORMAL

## 2023-04-19 NOTE — TELEPHONE ENCOUNTER
Homecare Orders Requested:     RN [facility] at Phone Number [phone]  called to requesting verbal orders for the following Services:    Skilled Nursing for:Delay start of care until 4/25 due to staffing.      Routing to provider.     Homecare agency to fax orders over for review and signature of PCP.    Sarah Estrella RN

## 2023-04-20 NOTE — TELEPHONE ENCOUNTER
Please review [employer] message from patient regarding appointment with neurology and additional questions regarding dizziness. If you would like patient to be seen sooner than October with Neurology, the scheduling staff with the Neurosciences department has recommended sending an inbasket message in Epic to Dr. Abdi, Dr. Junior, and Dr. Wolfe to see if they would be willing to get her in sooner than their first available.    Mateo Parker CMA on 4/20/2023 at 9:54 AM

## 2023-04-21 ENCOUNTER — NURSE TRIAGE (OUTPATIENT)
Dept: ONCOLOGY | Facility: CLINIC | Age: 88
End: 2023-04-21
Payer: MEDICARE

## 2023-04-21 NOTE — TELEPHONE ENCOUNTER
Writer called and spoke with Regi (C2C) to review PCP's recommendation.    Regi states symptoms are not too severe where patient cannot get out of bed, declines to go to ER. States that patient is able to get out of bed and walk. Meclizine was given early this morning which seemed to be helpful. Regi states they are also not willing to go to [facility] (no in person appointments available today)     Patient also saw oncology on 4/17/23 and discussed dizziness - referred to neurology    Regi states she just wants to know what the next steps are since patient was unable to schedule with neurology until October if patient should be referred elsewhere or other recommendations from patient? (dizzy/balance center etc)     Regi is wondering if patient's pacemaker could be related to symptoms and states she is scheduled for a pacemaker check next week.    Routing to PCP to please review and advise, thank you!     Callback to Regi [phone] - ok to leave detailed VM     Silverio Herrera RN  [facility]

## 2023-04-21 NOTE — TELEPHONE ENCOUNTER
It sounds like they have a lot of good questions, can you help them schedule a virtual visit with me on Monday?  If things worsen over the weekend, then the ER would be best, but a VV on Monday would allow us to make a plan and discuss symptoms etc

## 2023-04-21 NOTE — TELEPHONE ENCOUNTER
I am afraid that I don't have a way to manage these symptoms by telephone  If the symptoms are severe and she cannot get out of bed, then I think the ER is the best advice  She can receive IVFs and rapid brain imaging if indicated in the ER

## 2023-04-21 NOTE — TELEPHONE ENCOUNTER
Called patients daughter in law, Regi, who is on consent to communicate, gave PCP message. She verbalized understanding. Scheduled appointment.     Appointments in Next Year    Apr 24, 2023 12:30 PM  (Arrive by 12:10 PM)  Provider Visit with César Chung MD  [facility] ([facility] ) [phone]   Apr 26, 2023 12:30 PM  (Arrive by 12:15 PM)  Vestibular Eval with Faith Blair PT  [facility] ) [phone]   Apr 28, 2023  9:00 AM  CARDIAC DEVICE CHECK - IN CLINIC with GABRIEL MAYERS  [facility] ([facility] ) [phone]   Apr 28, 2023 10:00 AM  (Arrive by 9:55 AM)  Return EP with MARIUSZ Avery CNP  [facility] ([facility] ) [phone]   May 01, 2023  9:45 AM  Lab Peripheral with KAYLA PIV LAB  [facility] ) [phone]   May 01, 2023 10:00 AM  Return Patient with Vivian Macedo PA-C  [facility] ) [phone]   May 01, 2023 10:30 AM  Infusion Visit with  CANCER INFUSION NURSE,  INFUSION CHAIR 7  [facility] ([facility] ) [phone]   May 02, 2023  2:00 PM  (Arrive by 1:40 PM)  Provider Visit with César Chung MD  [facility] ([facility] ) [phone]   May 03, 2023 12:30 PM  Vestibular Treatment with Faith Blair, TREVOR  [facility] ) [phone]   May 15, 2023  9:30 AM  Return Patient with MARIUSZ Mckeon CNP  [facility] ([facility] ) [phone]   May 15, 2023  9:45 AM  Lab Peripheral with KAYLA ZALDIVAR LAB  [facility]  [place] ([facility] ) [phone]   May 15, 2023 10:30 AM  Infusion Visit with  CANCER INFUSION NURSE,  INFUSION CHAIR 2  [facility] ([facility] ) [phone]   Oct 10, 2023  2:00 PM  (Arrive by 1:45 PM)  New Visit with César Wolfe MD  [facility] ([facility] ) [phone]        Janice Venegas RN on 4/21/2023 at 12:45 PM

## 2023-04-21 NOTE — TELEPHONE ENCOUNTER
TO PCP:     Patient's daughter in law (Regi) called - pt complaining of dizziness/vertigo symptoms and fatigue     Daughter in law is having pt stay with her while she is having vertigo-like symptoms     DESCRIPTION : room is spinning, daughter in law unsure if any lightheadedness  ONSET: sudden onset Sunday morning - was fine during the weekend prior to that. Grandchildren were around pt prior to this.   SEVERITY: sometimes Is okay, meclizine helps, sometimes cannot even stand due to room is spinning and needing to lay down   AGGRAVATING/ALLEVIATING: did have physical therapy in December - did exercises with her. Worsening with putting head down/looking down to floor   HEART RATE: wears a pacemaker   CAUSE: in past thought maybe was vertigo   MEDICATION: taking Meclizine every 6 hours, is helpful   RECURRENT SYMPTOM: Yes - in December was same symptoms - dx with vertigo   OTHER - denies the following fever, chest pain, vomiting, diarrhea, bleeding, one sided weakness, vision or speech changes     Checked  O2, pulse, BP all normal     Neurology cannot see pt until Oct - calls every day for cancellations but is not having any luck getting her in sooner     Nurse Triage      Is this a 2nd Level Triage? YES, LICENSED PRACTITIONER REVIEW IS REQUIRED    Protocol Recommended Disposition:   Go To ED/UCC Now (Or To Office With PCP Approval)    Routed to provider    Does the patient meet one of the following criteria for ADS visit consideration? 16+ years old, with an [facility] PCP     TIP  Providers, please consider if this condition is appropriate for management at one of our Acute and Diagnostic Services sites.     If patient is a good candidate, please use dotphrase <dot>triageresponse and select Refer to ADS to document.    Grace LIGHT, Triage RN  [facility]       Reason for Disposition    Spinning or tilting sensation (vertigo) present now and one or more stroke risk factors (i.e.,  hypertension, diabetes mellitus, prior stroke/TIA, heart attack, age over 60) (Exception: prior physician evaluation for this AND no different/worse than usual)    Additional Information    Negative: SEVERE difficulty breathing (e.g., struggling for each breath, speaks in single words)    Negative: Shock suspected (e.g., cold/pale/clammy skin, too weak to stand, low BP, rapid pulse)    Negative: Difficult to awaken or acting confused (e.g., disoriented, slurred speech)    Negative: Fainted, and still feels dizzy afterwards    Negative: Overdose (accidental or intentional) of medications    Negative: New neurologic deficit that is present now: * Weakness of the face, arm, or leg on one side of the body * Numbness of the face, arm, or leg on one side of the body * Loss of speech or garbled speech    Negative: Heart beating < 50 beats per minute OR > 140 beats per minute    Negative: Sounds like a life-threatening emergency to the triager    Negative: Chest pain    Negative: Rectal bleeding, bloody stool, or tarry-black stool    Negative: Vomiting is main symptom    Negative: Diarrhea is main symptom    Negative: Headache is main symptom    Negative: Heat exhaustion suspected (i.e., dehydration from heat exposure)    Negative: Patient states that they are having an anxiety or panic attack    Negative: Dizziness from low blood sugar (i.e., < 60 mg/dl or 3.5 mmol/l)    Negative: SEVERE headache or neck pain    Negative: SEVERE dizziness (e.g., unable to stand, requires support to walk, feels like passing out now)    Protocols used: DIZZINESS-A-OH

## 2023-04-24 ENCOUNTER — VIRTUAL VISIT (OUTPATIENT)
Dept: FAMILY MEDICINE | Facility: CLINIC | Age: 88
End: 2023-04-24
Payer: MEDICARE

## 2023-04-24 ENCOUNTER — TELEPHONE (OUTPATIENT)
Dept: PHARMACY | Facility: CLINIC | Age: 88
End: 2023-04-24

## 2023-04-24 DIAGNOSIS — C90.00 MULTIPLE MYELOMA NOT HAVING ACHIEVED REMISSION (H): Primary | ICD-10-CM

## 2023-04-24 DIAGNOSIS — H81.399 PERIPHERAL VERTIGO, UNSPECIFIED LATERALITY: Primary | ICD-10-CM

## 2023-04-24 PROCEDURE — 99213 OFFICE O/P EST LOW 20 MIN: CPT | Mod: VID | Performed by: INTERNAL MEDICINE

## 2023-04-24 RX ORDER — LENALIDOMIDE 10 MG/1
10 CAPSULE ORAL DAILY
Qty: 14 CAPSULE | Refills: 0 | Status: SHIPPED | OUTPATIENT
Start: 2023-04-24 | End: 2023-07-10 | Stop reason: ALTCHOICE

## 2023-04-24 NOTE — PROGRESS NOTES
Quiana is a 87 year old who is being evaluated via a billable video visit.      How would you like to obtain your AVS? MyChart  If the video visit is dropped, the invitation should be resent by: MY-CHART VIDEO If Need:Text to cell phone: [phone]  Will anyone else be joining your video visit? Yes, Jackie, Daughter-in-law        Assessment & Plan     Peripheral vertigo, unspecified laterality  Her symptoms seem most consistent with peripheral vertigo  If she has canalith disease then Epply maneuvers might be helpful  Home care is coming tomorrow, hopefully the therapist that will see her tomorrow could attempt Epply maneuvers, if not, then we can set up PT at the  rehab facility, her daughter in law will reach out to me if that is the case  The meclizine helps the symptoms, but sometimes prolonged use can prolong the time to resolution of the peripheral vertigo, she is stuck between a rock and hard place there because there are risks associated with taking lorazepam too and I reviewed these risks with Quiana and her daughter in law        29 minutes spent by me on the date of the encounter doing chart review, history and exam, documentation and further activities per the note           César Chung MD  [facility]    Subjective   Quiana is a 87 year old, presenting for the following health issues:  Dizziness (Ongoing dizziness)         View : No data to display.              History of Present Illness       Reason for visit:  Dizziness    She eats 0-1 servings of fruits and vegetables daily.She exercises with enough effort to increase her heart rate 9 or less minutes per day.  She exercises with enough effort to increase her heart rate 3 or less days per week.   She is taking medications regularly.         She has been having a lot of vertigo spells since December  She actually felt well during march and early April, but symptoms returned  She saw Dr. Carroll and he felt she had a  labrynthitis  She had an MRI in December without acute findings  She never did canalith repositioning   Meclizine is helpful and she is using a lot of meclizine  She has also used some lorazepam and finds it very effective       Review of Systems         Objective           Vitals:  No vitals were obtained today due to virtual visit.    Physical Exam   GENERAL: Healthy, alert and no distress  EYES: Eyes grossly normal to inspection.  No discharge or erythema, or obvious scleral/conjunctival abnormalities.  RESP: No audible wheeze, cough, or visible cyanosis.  No visible retractions or increased work of breathing.    SKIN: Visible skin clear. No significant rash, abnormal pigmentation or lesions.  NEURO: Cranial nerves grossly intact.  Mentation and speech appropriate for age.  PSYCH: Mentation appears normal, affect normal/bright, judgement and insight intact, normal speech and appearance well-groomed.                Video-Visit Details    Type of service:  Video Visit   Video Start Time: 12:40 PM  Video End Time:1:00 PM    Originating Location (pt. Location): Home  Distant Location (provider location):  On-site  Platform used for Video Visit: Tae

## 2023-04-24 NOTE — TELEPHONE ENCOUNTER
Oral Chemotherapy Monitoring Program   Medication: Revlimid  Rx: 10mg PO daily on days 1 through 14 of 21 day cycle   Auth #: [id number] obtained on 4/24/23  Risk Category: adult female of non child bearing potential  Routine survey questions reviewed.   Rx to be Escribed to [facility]     Terrie Bowen [facility] Oncology Pharmacy Liaison  [phone]

## 2023-04-25 ENCOUNTER — TELEPHONE (OUTPATIENT)
Dept: FAMILY MEDICINE | Facility: CLINIC | Age: 88
End: 2023-04-25
Payer: MEDICARE

## 2023-04-25 NOTE — TELEPHONE ENCOUNTER
The Home Care/Assisted Living/Nursing Facility is calling regarding an established patient.  Has the patient seen Home Care in the past or is currently residing in Assisted Living or Nursing Facility? Yes.     Arnulfo calling from [facility] requesting the following orders that are within the Home Care, Assisted Living or Nursing Home Eval and Treatment standing order and can be signed as standing order signature required by RN.    Preferred Call Back Number: [phone], confidential     Start Skilled nursing, PT, and OT. Verbal approval given. Pt had visit with PCP yesterday, referral ordered and this is not delay in start of care, that was already addressed in separate encounter.    Any additional Orders:  Are there any orders requested, not stated above, that are outside of the standing order and must be routed to a licensed practitioner for approval?    No    Writer has verified Requestor will send fax to have orders signed.    Montse BROWN RN  [facility]

## 2023-04-26 ENCOUNTER — PATIENT OUTREACH (OUTPATIENT)
Dept: ONCOLOGY | Facility: CLINIC | Age: 88
End: 2023-04-26

## 2023-04-26 DIAGNOSIS — C90.00 MULTIPLE MYELOMA NOT HAVING ACHIEVED REMISSION (H): ICD-10-CM

## 2023-04-26 RX ORDER — LORAZEPAM 0.5 MG/1
0.5 TABLET ORAL EVERY 6 HOURS PRN
Qty: 30 TABLET | Refills: 3 | Status: CANCELLED | OUTPATIENT
Start: 2023-04-26

## 2023-04-26 RX ORDER — LORAZEPAM 0.5 MG/1
0.5 TABLET ORAL EVERY 6 HOURS PRN
Qty: 30 TABLET | Refills: 3 | Status: SHIPPED | OUTPATIENT
Start: 2023-04-26 | End: 2024-03-28

## 2023-04-26 NOTE — PROGRESS NOTES
Patient requesting a new script of Ativan to be sen to  Specialty pharmacy.     Alecia Briggs RN

## 2023-04-27 NOTE — TELEPHONE ENCOUNTER
Patient Contact    Attempt # 1    Was call answered?  Yes[facility].

## 2023-04-28 ENCOUNTER — OFFICE VISIT (OUTPATIENT)
Dept: CARDIOLOGY | Facility: CLINIC | Age: 88
End: 2023-04-28
Payer: MEDICARE

## 2023-04-28 ENCOUNTER — ANCILLARY PROCEDURE (OUTPATIENT)
Dept: CARDIOLOGY | Facility: CLINIC | Age: 88
End: 2023-04-28
Payer: MEDICARE

## 2023-04-28 VITALS
WEIGHT: 127 LBS | SYSTOLIC BLOOD PRESSURE: 137 MMHG | BODY MASS INDEX: 23.37 KG/M2 | RESPIRATION RATE: 17 BRPM | OXYGEN SATURATION: 99 % | HEART RATE: 85 BPM | DIASTOLIC BLOOD PRESSURE: 74 MMHG | HEIGHT: 62 IN

## 2023-04-28 DIAGNOSIS — I49.5 SICK SINUS SYNDROME (H): ICD-10-CM

## 2023-04-28 DIAGNOSIS — Z95.0 CARDIAC PACEMAKER IN SITU: ICD-10-CM

## 2023-04-28 PROCEDURE — 99214 OFFICE O/P EST MOD 30 MIN: CPT | Performed by: NURSE PRACTITIONER

## 2023-04-28 PROCEDURE — 93280 PM DEVICE PROGR EVAL DUAL: CPT | Performed by: INTERNAL MEDICINE

## 2023-04-28 NOTE — PROGRESS NOTES
Electrophysiology Clinic Progress Note  Quiana Dunaway MRN# [id number]   YOB: 1935 Age: 87 year old     Primary cardiologist: Dr. Worthington    Reason for visit: Annual follow up    History of presenting illness:    Quiana Dunaway is a pleasant 87 year old patient with past medical history significant for:    1. Hypertension  2. Multiple myeloma-stable and follows with Dr. Parry  3. HFpEF  4. Symptomatic second-degree AV block status post pacemaker in 3/2017.  5. Asymptomatic paroxysmal atrial fibrillation: [id number] score of 4.  Previously had extensive discussions with patient and family and myeloma and increased risk of falls anticoagulation was not started.  6. CKD    She was last evaluated by Dr. Worthington in 2/2022 to follow-up on her device.  At that time her RV lead thresholds were slightly elevated and have been consistently elevated.  Blood pressure was well controlled and patient exhibited no symptoms of heart failure.    She was seen by Dr. Chung earlier this week for dizziness that he thought was possibly vertigo.  It was recommended that she undergo physical therapy for the Epley maneuvers either by home care or outpatient physical therapy.  Symptoms somewhat improved with meclizine.    Today she presents with her daughter-in-law to follow-up with her in person device check.  Overall, from a cardiac standpoint she is doing well.  Unfortunately she has developed vertigo and is working with Dr. Chung.  She states she has not had the Epley maneuvers as of yet but she is scheduled for PT to do so.    Device check prior to today's visit noted patient is 100% V paced and 84% a paced.  Underlying rhythm was sinus rhythm in the 60s with complete heart block and no junctional escape.  2 episodes of A-fib with the longest lasting 27 minutes and 43 seconds.  Rates were in the 60s.    Diagnotic studies:    Device check (4/28/2023): 00% V paced and 84% a paced.  Underlying rhythm was sinus rhythm  in the 60s with complete heart block and no junctional escape.  2 episodes of A-fib with the longest lasting 27 minutes and 43 seconds.  Rates were in the 60s.    Device check (2/2022): Underlying rhythm sinus rhythm in the 60s with complete heart block and junctional escape beats at 33 bpm.  RA threshold slightly elevated at 2 V, 1 episode of atrial fibrillation with controlled ventricular response lasting 8 minutes and 9 seconds.  Total burden less than 1%.  No ventricular arrhythmias    Echocardiogram (2020): LVEF of 60 to 65% with grade 1 early diastolic dysfunction without regional wall motion abnormalities.            Assessment and Plan:     ASSESSMENT:    1. Symptomatic second-degree AV block and paroxysmal atrial fibrillation    Status post PPM in 2017 ([employer])    Device check from today noted that she continues to have elevated RV thresholds.  And 1 episode of paroxysmal atrial fibrillation lasting 27 minutes and 43 seconds.      [id number] score 4 but not anticoagulation due to myeloma and    Increased risk of falls    2. Hypertension    Well-controlled    PLAN:     1. Continue all medications without changes.  2. Please follow up with cardiology in 1 year  3. Follow up device clinic quarterly       Orders this Visit:  Orders Placed This Encounter   Procedures     Follow-Up with Cardiology ROSA MARIA     No orders of the defined types were placed in this encounter.    There are no discontinued medications.    Today's clinic visit entailed:  Review of the result(s) of each unique test - Device check, Echo  Assessment requiring an independent historian(s) - family - Daughter-in-Law  30 minutes spent by me on the date of the encounter doing chart review, history and exam, documentation and further activities per the note  Provider  Link to [facility] Help Grid     The level of medical decision making during this visit was of moderate complexity.           Review of Systems:     Review of Systems:  Skin:  Negative    "  Eyes:  Negative    ENT:  Negative    Respiratory:  Positive for shortness of breath;dyspnea on exertion  Cardiovascular:    Positive for;lightheadedness;dizziness  Gastroenterology: Positive for nausea  Genitourinary:  Negative    Musculoskeletal:  Negative    Neurologic:  Negative    Psychiatric:  not assessed    Heme/Lymph/Imm:  Negative    Endocrine:  Negative              Physical Exam:     Vitals: /74   Pulse 85   Resp 17   Ht 1.575 m (5' 2\")   Wt 57.6 kg (127 lb)   SpO2 99%   BMI 23.23 kg/m    Constitutional: Thin and frail and in no apparent distress.  Eyes: Pupils equal, round. Sclerae anicteric.   HEENT: Normocephalic, atraumatic.   Neck: Supple.  Respiratory: Breathing non-labored. Lungs clear to auscultation bilaterally. No crackles, wheezes, rhonchi, or rales.  Cardiovascular:  Regular rate and rhythm, normal S1 and S2. No murmur, rub, or gallop.  Skin: Warm, dry. No rashes, cyanosis, or xanthelasma.  Extremities: No edema.  Neurologic: No gross motor deficits. Alert, awake, and oriented to person, place and time.  Psychiatric: Affect appropriate.        CURRENT MEDICATIONS:  Current Outpatient Medications   Medication Sig Dispense Refill     acyclovir (ZOVIRAX) 400 MG tablet Take 1 tablet (400 mg) by mouth 2 times daily Viral Prophylaxis. 180 tablet 3     aspirin 81 MG chewable tablet Take 81 mg by mouth daily        cyanocobalamin (VITAMIN B-12) 1000 MCG tablet Take 1 tablet (1,000 mcg) by mouth daily 90 tablet 2     Daratumumab (DARZALEX IV) Q weekly injection, given on Mondays at cancer infusion center       dexamethasone (DECADRON) 4 MG tablet Take 1 tablet (4 mg) by mouth once a week ON SUNDAYS       famotidine (PEPCID) 40 MG tablet Take 1 tablet (40 mg) by mouth 2 times daily 180 tablet 3     furosemide (LASIX) 20 MG tablet In AM as needed for blood pressure greater than 160/100 90 tablet 3     LENalidomide (REVLIMID) 10 MG CAPS capsule Take 1 capsule (10 mg) by mouth daily for 14 " days . Then take 7 days off. Take at the same time each day. Do not open, break or chew the capsule. Swallow whole, with water. 14 capsule 0     Loperamide HCl (IMODIUM A-D PO) Take 1 tablet by mouth as needed        LORazepam (ATIVAN) 0.5 MG tablet Take 1 tablet (0.5 mg) by mouth every 6 hours as needed for anxiety 30 tablet 3     meclizine (ANTIVERT) 25 MG tablet Take 1 tablet (25 mg) by mouth 3 times daily as needed for dizziness 30 tablet 0     methimazole (TAPAZOLE) 5 MG tablet Take 0.5 tablets (2.5 mg) by mouth daily 45 tablet 3     mirtazapine (REMERON) 15 MG tablet Take 1 tablet (15 mg) by mouth At Bedtime 90 tablet 3     nitroglycerin (NITROSTAT) 0.4 MG sublingual tablet For chest pain place 1 tablet under the tongue every 5 minutes for 3 doses. If symptoms persist 5 minutes after 1st dose call 911. 25 tablet 0     ondansetron (ZOFRAN) 8 MG tablet Take 1 tablet (8 mg) by mouth every 8 hours as needed for nausea 90 tablet 11     potassium chloride ER (KLOR-CON M) 10 MEQ CR tablet Take 1 tablet (10 mEq) by mouth daily 90 tablet 3     prochlorperazine (COMPAZINE) 10 MG tablet Take 1 tablet (10 mg) by mouth every 6 hours as needed (Nausea/Vomiting) 30 tablet 6     LENalidomide (REVLIMID) 10 MG CAPS capsule Take 1 capsule (10 mg) by mouth daily for 14 days . Then take 7 days off. Take at the same time each day. Do not open, break or chew the capsule. Swallow whole, with water. 14 capsule 0     LENalidomide (REVLIMID) 10 MG CAPS capsule Take 1 capsule (10 mg) by mouth daily for 14 days . Then take 7 days off. Take at the same time each day. Do not open, break or chew the capsule. Swallow whole, with water. 14 capsule 0       ALLERGIES  Allergies   Allergen Reactions     Levaquin [Levofloxacin] Other (See Comments)     Tingling in feet after 1 dose on 8/7/19           PAST MEDICAL HISTORY:  Past Medical History:   Diagnosis Date     Anxiety      HTN (hypertension)      Multiple myeloma not having achieved  remission (H) 7/18/2017     Pacemaker     Placed due to symptomatic second degree AV Block     Pancytopenia (H) 6/28/2017     Paroxysmal atrial fibrillation (H)        PAST SURGICAL HISTORY:  Past Surgical History:   Procedure Laterality Date     ------------OTHER-------------      cataract eye surgery     BONE MARROW BIOPSY, BONE SPECIMEN, NEEDLE/TROCAR N/A 7/12/2017    Procedure: BIOPSY BONE MARROW;  BONE MARROW BIOPSY ;  Surgeon: Grace Vela MD;  Location:  GI     IMPLANT PACEMAKER  03/2017    AV block       FAMILY HISTORY:  Family History   Problem Relation Age of Onset     Unknown/Adopted Mother      Unknown/Adopted Father        SOCIAL HISTORY:  Social History     Socioeconomic History     Marital status:      Spouse name: None     Number of children: None     Years of education: None     Highest education level: None   Tobacco Use     Smoking status: Never     Smokeless tobacco: Never   Substance and Sexual Activity     Alcohol use: No     Alcohol/week: 0.0 standard drinks of alcohol     Drug use: No     Sexual activity: Not Currently     Partners: Female     Social Determinants of Health     Financial Resource Strain: Low Risk  (5/27/2020)    Overall Financial Resource Strain (CARDIA)      Difficulty of Paying Living Expenses: Not hard at all   Food Insecurity: No Food Insecurity (5/27/2020)    Hunger Vital Sign      Worried About Running Out of Food in the Last Year: Never true      Ran Out of Food in the Last Year: Never true   Transportation Needs: No Transportation Needs (5/27/2020)    PRAPARE - Transportation      Lack of Transportation (Medical): No      Lack of Transportation (Non-Medical): No   Physical Activity: Inactive (5/27/2020)    Exercise Vital Sign      Days of Exercise per Week: 0 days      Minutes of Exercise per Session: 0 min   Stress: No Stress Concern Present (5/27/2020)    [employer] - Occupational Stress Questionnaire      Feeling of  Stress : Not at all   Social Connections: Unknown (5/27/2020)    Social Connection and Isolation Panel [NHANES]      Frequency of Communication with Friends and Family: Twice a week      Frequency of Social Gatherings with Friends and Family: Twice a week      Attends [employer] Services: More than 4 times per year      Active Member of Clubs or Organizations: Yes      Attends Club or Organization Meetings: Never

## 2023-04-28 NOTE — PATIENT INSTRUCTIONS
Today's Recommendations    Continue all medications without changes.  Please follow up with cardiology in 1 year  Device clinic quarterly.    Please send a [employer] message or call [phone] to the RN team with questions or concerns.     Scheduling number [phone]    MARIUSZ Avery, CNP

## 2023-04-28 NOTE — LETTER
4/28/2023    César Chung MD  [address]  [facility] [address]    RE: Quiana Dunaway       Dear Colleague,     I had the pleasure of seeing Quiana Dunaway in the [facility].    Electrophysiology Clinic Progress Note  Quiana Dunaway MRN# [id number]   YOB: 1935 Age: 87 year old     Primary cardiologist: Dr. Worthington    Reason for visit: Annual follow up    History of presenting illness:    Quiana Dunaway is a pleasant 87 year old patient with past medical history significant for:    Hypertension  Multiple myeloma-stable and follows with Dr. Parry  HFpEF  Symptomatic second-degree AV block status post pacemaker in 3/2017.  Asymptomatic paroxysmal atrial fibrillation: [id number] score of 4.  Previously had extensive discussions with patient and family and myeloma and increased risk of falls anticoagulation was not started.  CKD    She was last evaluated by Dr. Worthington in 2/2022 to follow-up on her device.  At that time her RV lead thresholds were slightly elevated and have been consistently elevated.  Blood pressure was well controlled and patient exhibited no symptoms of heart failure.    She was seen by Dr. Chung earlier this week for dizziness that he thought was possibly vertigo.  It was recommended that she undergo physical therapy for the Epley maneuvers either by home care or outpatient physical therapy.  Symptoms somewhat improved with meclizine.    Today she presents with her daughter-in-law to follow-up with her in person device check.  Overall, from a cardiac standpoint she is doing well.  Unfortunately she has developed vertigo and is working with Dr. Chung.  She states she has not had the Epley maneuvers as of yet but she is scheduled for PT to do so.    Device check prior to today's visit noted patient is 100% V paced and 84% a paced.  Underlying rhythm was sinus rhythm in the 60s with complete heart block and no junctional escape.  2 episodes of A-fib  with the longest lasting 27 minutes and 43 seconds.  Rates were in the 60s.    Diagnotic studies:  Device check (4/28/2023): 00% V paced and 84% a paced.  Underlying rhythm was sinus rhythm in the 60s with complete heart block and no junctional escape.  2 episodes of A-fib with the longest lasting 27 minutes and 43 seconds.  Rates were in the 60s.  Device check (2/2022): Underlying rhythm sinus rhythm in the 60s with complete heart block and junctional escape beats at 33 bpm.  RA threshold slightly elevated at 2 V, 1 episode of atrial fibrillation with controlled ventricular response lasting 8 minutes and 9 seconds.  Total burden less than 1%.  No ventricular arrhythmias  Echocardiogram (2020): LVEF of 60 to 65% with grade 1 early diastolic dysfunction without regional wall motion abnormalities.            Assessment and Plan:     ASSESSMENT:    Symptomatic second-degree AV block and paroxysmal atrial fibrillation  Status post PPM in 2017 ([employer])  Device check from today noted that she continues to have elevated RV thresholds.  And 1 episode of paroxysmal atrial fibrillation lasting 27 minutes and 43 seconds.    [id number] score 4 but not anticoagulation due to myeloma and  Increased risk of falls    Hypertension  Well-controlled    PLAN:     Continue all medications without changes.  Please follow up with cardiology in 1 year  Follow up device clinic quarterly       Orders this Visit:  Orders Placed This Encounter   Procedures    Follow-Up with Cardiology ROSA MARIA     No orders of the defined types were placed in this encounter.    There are no discontinued medications.    Today's clinic visit entailed:  Review of the result(s) of each unique test - Device check, Echo  Assessment requiring an independent historian(s) - family - Daughter-in-Law  30 minutes spent by me on the date of the encounter doing chart review, history and exam, documentation and further activities per the note  Provider  Link to [facility] Help  "Grid     The level of medical decision making during this visit was of moderate complexity.           Review of Systems:     Review of Systems:  Skin:  Negative     Eyes:  Negative    ENT:  Negative    Respiratory:  Positive for shortness of breath;dyspnea on exertion  Cardiovascular:    Positive for;lightheadedness;dizziness  Gastroenterology: Positive for nausea  Genitourinary:  Negative    Musculoskeletal:  Negative    Neurologic:  Negative    Psychiatric:  not assessed    Heme/Lymph/Imm:  Negative    Endocrine:  Negative              Physical Exam:     Vitals: /74   Pulse 85   Resp 17   Ht 1.575 m (5' 2\")   Wt 57.6 kg (127 lb)   SpO2 99%   BMI 23.23 kg/m    Constitutional: Thin and frail and in no apparent distress.  Eyes: Pupils equal, round. Sclerae anicteric.   HEENT: Normocephalic, atraumatic.   Neck: Supple.  Respiratory: Breathing non-labored. Lungs clear to auscultation bilaterally. No crackles, wheezes, rhonchi, or rales.  Cardiovascular:  Regular rate and rhythm, normal S1 and S2. No murmur, rub, or gallop.  Skin: Warm, dry. No rashes, cyanosis, or xanthelasma.  Extremities: No edema.  Neurologic: No gross motor deficits. Alert, awake, and oriented to person, place and time.  Psychiatric: Affect appropriate.        CURRENT MEDICATIONS:  Current Outpatient Medications   Medication Sig Dispense Refill    acyclovir (ZOVIRAX) 400 MG tablet Take 1 tablet (400 mg) by mouth 2 times daily Viral Prophylaxis. 180 tablet 3    aspirin 81 MG chewable tablet Take 81 mg by mouth daily       cyanocobalamin (VITAMIN B-12) 1000 MCG tablet Take 1 tablet (1,000 mcg) by mouth daily 90 tablet 2    Daratumumab (DARZALEX IV) Q weekly injection, given on Mondays at cancer infusion center      dexamethasone (DECADRON) 4 MG tablet Take 1 tablet (4 mg) by mouth once a week ON SUNDAYS      famotidine (PEPCID) 40 MG tablet Take 1 tablet (40 mg) by mouth 2 times daily 180 tablet 3    furosemide (LASIX) 20 MG tablet In AM " as needed for blood pressure greater than 160/100 90 tablet 3    LENalidomide (REVLIMID) 10 MG CAPS capsule Take 1 capsule (10 mg) by mouth daily for 14 days . Then take 7 days off. Take at the same time each day. Do not open, break or chew the capsule. Swallow whole, with water. 14 capsule 0    Loperamide HCl (IMODIUM A-D PO) Take 1 tablet by mouth as needed       LORazepam (ATIVAN) 0.5 MG tablet Take 1 tablet (0.5 mg) by mouth every 6 hours as needed for anxiety 30 tablet 3    meclizine (ANTIVERT) 25 MG tablet Take 1 tablet (25 mg) by mouth 3 times daily as needed for dizziness 30 tablet 0    methimazole (TAPAZOLE) 5 MG tablet Take 0.5 tablets (2.5 mg) by mouth daily 45 tablet 3    mirtazapine (REMERON) 15 MG tablet Take 1 tablet (15 mg) by mouth At Bedtime 90 tablet 3    nitroglycerin (NITROSTAT) 0.4 MG sublingual tablet For chest pain place 1 tablet under the tongue every 5 minutes for 3 doses. If symptoms persist 5 minutes after 1st dose call 911. 25 tablet 0    ondansetron (ZOFRAN) 8 MG tablet Take 1 tablet (8 mg) by mouth every 8 hours as needed for nausea 90 tablet 11    potassium chloride ER (KLOR-CON M) 10 MEQ CR tablet Take 1 tablet (10 mEq) by mouth daily 90 tablet 3    prochlorperazine (COMPAZINE) 10 MG tablet Take 1 tablet (10 mg) by mouth every 6 hours as needed (Nausea/Vomiting) 30 tablet 6    LENalidomide (REVLIMID) 10 MG CAPS capsule Take 1 capsule (10 mg) by mouth daily for 14 days . Then take 7 days off. Take at the same time each day. Do not open, break or chew the capsule. Swallow whole, with water. 14 capsule 0    LENalidomide (REVLIMID) 10 MG CAPS capsule Take 1 capsule (10 mg) by mouth daily for 14 days . Then take 7 days off. Take at the same time each day. Do not open, break or chew the capsule. Swallow whole, with water. 14 capsule 0       ALLERGIES  Allergies   Allergen Reactions    Levaquin [Levofloxacin] Other (See Comments)     Tingling in feet after 1 dose on 8/7/19           PAST  MEDICAL HISTORY:  Past Medical History:   Diagnosis Date    Anxiety     HTN (hypertension)     Multiple myeloma not having achieved remission (H) 7/18/2017    Pacemaker     Placed due to symptomatic second degree AV Block    Pancytopenia (H) 6/28/2017    Paroxysmal atrial fibrillation (H)        PAST SURGICAL HISTORY:  Past Surgical History:   Procedure Laterality Date    ------------OTHER-------------      cataract eye surgery    BONE MARROW BIOPSY, BONE SPECIMEN, NEEDLE/TROCAR N/A 7/12/2017    Procedure: BIOPSY BONE MARROW;  BONE MARROW BIOPSY ;  Surgeon: Grace Vela MD;  Location:  GI    IMPLANT PACEMAKER  03/2017    AV block       FAMILY HISTORY:  Family History   Problem Relation Age of Onset    Unknown/Adopted Mother     Unknown/Adopted Father        SOCIAL HISTORY:  Social History     Socioeconomic History    Marital status:      Spouse name: None    Number of children: None    Years of education: None    Highest education level: None   Tobacco Use    Smoking status: Never    Smokeless tobacco: Never   Substance and Sexual Activity    Alcohol use: No     Alcohol/week: 0.0 standard drinks of alcohol    Drug use: No    Sexual activity: Not Currently     Partners: Female     Social Determinants of Health     Financial Resource Strain: Low Risk  (5/27/2020)    Overall Financial Resource Strain (CARDIA)     Difficulty of Paying Living Expenses: Not hard at all   Food Insecurity: No Food Insecurity (5/27/2020)    Hunger Vital Sign     Worried About Running Out of Food in the Last Year: Never true     Ran Out of Food in the Last Year: Never true   Transportation Needs: No Transportation Needs (5/27/2020)    PRAPARE - Transportation     Lack of Transportation (Medical): No     Lack of Transportation (Non-Medical): No   Physical Activity: Inactive (5/27/2020)    Exercise Vital Sign     Days of Exercise per Week: 0 days     Minutes of Exercise per Session: 0 min   Stress: No Stress Concern Present  (5/27/2020)    [employer] - Occupational Stress Questionnaire     Feeling of Stress : Not at all   Social Connections: Unknown (5/27/2020)    Social Connection and Isolation Panel [NHANES]     Frequency of Communication with Friends and Family: Twice a week     Frequency of Social Gatherings with Friends and Family: Twice a week     Attends Baptism Services: More than 4 times per year     Active Member of Clubs or Organizations: Yes     Attends Club or Organization Meetings: Never       Thank you for allowing me to participate in the care of your patient.      Sincerely,     MARIUSZ [facility]  cc:   Soco Lentz MD

## 2023-05-01 ENCOUNTER — INFUSION THERAPY VISIT (OUTPATIENT)
Dept: INFUSION THERAPY | Facility: CLINIC | Age: 88
End: 2023-05-01
Attending: INTERNAL MEDICINE
Payer: MEDICARE

## 2023-05-01 ENCOUNTER — ONCOLOGY VISIT (OUTPATIENT)
Dept: ONCOLOGY | Facility: CLINIC | Age: 88
End: 2023-05-01
Attending: INTERNAL MEDICINE
Payer: MEDICARE

## 2023-05-01 ENCOUNTER — TELEPHONE (OUTPATIENT)
Dept: FAMILY MEDICINE | Facility: CLINIC | Age: 88
End: 2023-05-01

## 2023-05-01 VITALS
SYSTOLIC BLOOD PRESSURE: 149 MMHG | BODY MASS INDEX: 23.45 KG/M2 | DIASTOLIC BLOOD PRESSURE: 86 MMHG | HEART RATE: 71 BPM | HEIGHT: 62 IN | OXYGEN SATURATION: 96 % | RESPIRATION RATE: 16 BRPM | WEIGHT: 127.4 LBS | TEMPERATURE: 98.5 F

## 2023-05-01 DIAGNOSIS — C90.00 MULTIPLE MYELOMA NOT HAVING ACHIEVED REMISSION (H): Primary | ICD-10-CM

## 2023-05-01 DIAGNOSIS — Z53.9 DIAGNOSIS NOT YET DEFINED: Primary | ICD-10-CM

## 2023-05-01 LAB
BASOPHILS # BLD AUTO: 0 10E3/UL (ref 0–0.2)
BASOPHILS NFR BLD AUTO: 1 %
ELLIPTOCYTES BLD QL SMEAR: ABNORMAL
EOSINOPHIL # BLD AUTO: 0.1 10E3/UL (ref 0–0.7)
EOSINOPHIL NFR BLD AUTO: 4 %
ERYTHROCYTE [DISTWIDTH] IN BLOOD BY AUTOMATED COUNT: 13.3 % (ref 10–15)
HCT VFR BLD AUTO: 31.3 % (ref 35–47)
HGB BLD-MCNC: 10.6 G/DL (ref 11.7–15.7)
HOLD SPECIMEN: NORMAL
HOLD SPECIMEN: NORMAL
IMM GRANULOCYTES # BLD: 0 10E3/UL
IMM GRANULOCYTES NFR BLD: 0 %
LYMPHOCYTES # BLD AUTO: 1.4 10E3/UL (ref 0.8–5.3)
LYMPHOCYTES NFR BLD AUTO: 52 %
MCH RBC QN AUTO: 31.1 PG (ref 26.5–33)
MCHC RBC AUTO-ENTMCNC: 33.9 G/DL (ref 31.5–36.5)
MCV RBC AUTO: 92 FL (ref 78–100)
MONOCYTES # BLD AUTO: 0.6 10E3/UL (ref 0–1.3)
MONOCYTES NFR BLD AUTO: 21 %
NEUTROPHILS # BLD AUTO: 0.6 10E3/UL (ref 1.6–8.3)
NEUTROPHILS NFR BLD AUTO: 22 %
NRBC # BLD AUTO: 0 10E3/UL
NRBC BLD AUTO-RTO: 0 /100
PLAT MORPH BLD: ABNORMAL
PLATELET # BLD AUTO: 124 10E3/UL (ref 150–450)
RBC # BLD AUTO: 3.41 10E6/UL (ref 3.8–5.2)
RBC MORPH BLD: ABNORMAL
WBC # BLD AUTO: 2.7 10E3/UL (ref 4–11)

## 2023-05-01 PROCEDURE — 99214 OFFICE O/P EST MOD 30 MIN: CPT | Performed by: PHYSICIAN ASSISTANT

## 2023-05-01 PROCEDURE — 85025 COMPLETE CBC W/AUTO DIFF WBC: CPT | Performed by: INTERNAL MEDICINE

## 2023-05-01 PROCEDURE — G0180 MD CERTIFICATION HHA PATIENT: HCPCS | Performed by: INTERNAL MEDICINE

## 2023-05-01 PROCEDURE — 96401 CHEMO ANTI-NEOPL SQ/IM: CPT

## 2023-05-01 PROCEDURE — 36415 COLL VENOUS BLD VENIPUNCTURE: CPT | Performed by: INTERNAL MEDICINE

## 2023-05-01 PROCEDURE — G0463 HOSPITAL OUTPT CLINIC VISIT: HCPCS | Mod: 25 | Performed by: PHYSICIAN ASSISTANT

## 2023-05-01 PROCEDURE — 250N000011 HC RX IP 250 OP 636: Performed by: INTERNAL MEDICINE

## 2023-05-01 PROCEDURE — G0463 HOSPITAL OUTPT CLINIC VISIT: HCPCS | Performed by: PHYSICIAN ASSISTANT

## 2023-05-01 RX ADMIN — DARATUMUMAB AND HYALURONIDASE-FIHJ (HUMAN RECOMBINANT) 1800 MG: 1800; 30000 INJECTION SUBCUTANEOUS at 11:16

## 2023-05-01 ASSESSMENT — PAIN SCALES - GENERAL: PAINLEVEL: NO PAIN (0)

## 2023-05-01 NOTE — PROGRESS NOTES
Medical Assistant Note:  Quiana Dunaway presents today for blood draw.    Patient seen by provider today: Yes: mary.   present during visit today: Not Applicable.    Concerns: No Concerns.    Procedure:  Lab draw site: left hand, Needle type: bf, Gauge: 23.    Post Assessment:  Labs drawn without difficulty: Yes.    Discharge Plan:  Departure Mode: Ambulatory.    Face to Face Time: 5 min.    Mariela Lyn, CMA

## 2023-05-01 NOTE — TELEPHONE ENCOUNTER
Reason for Call:  Home Health Care    KATH with [facility] called regarding (reason for call): VERBAL ORDERS    Orders are needed for this patient.     PT: 2VISITS; 1 VISIT A MONTH FOR 2 MONTHS    OT:     Skilled Nursing:     Pt Provider: DIOGENES MCCORD    Phone Number Homecare Nurse can be reached at: [phone]    Can we leave a detailed message on this number? YES      Best Time: ANYTIME    Call taken on 5/1/2023 at 3:32 PM by Selam Botello

## 2023-05-01 NOTE — PROGRESS NOTES
Oncology/Hematology Visit Note  May 1, 2023    Reason for Visit: Follow up of multiple myeloma    Primary Oncologist: Dr. Parry  Oncologic History:  Mrs. Dunaway is a lady with multiple myeloma (IgG kappa). High risk, t(14;16).  1. On 05/01/2017, WBC of 3.4, hemoglobin of 10.2 and platelet of 154.  2. SPEP on 07/07/2017 revealed M-spike of 1.8.  3. Bone marrow biopsy on 07/12/2017 reveals kappa monotypic plasma cell population consistent with multiple myeloma.  Bone marrow is 70% cellular.  Plasma cell is 50-60%.     -There is gain of chromosome 1, 5, 9, 15, and 17.  There is translocation 14;16.   4.  On 07/18/2017:  -M-spike of 1.9.   -Immunofixation reveals monoclonal IgG kappa.    -IgG level of 2970.  IgA of 15 and IgM of 175.    -Kappa free light chain of 67.7.  Lambda free light chain of 1.42.  Ratio of kappa to lambda of 47.71.    -Beta 2 microglobulin of 3.4.   5. PET scan on 07/24/2017 reveals several focal areas of increased FDG uptake consistent with multiple myeloma.  There is probable epithelial cyst in tail of the pancreas.  No associated abnormal FDG activity.  Left thyroid cysts or nodules without any FDG activity.  There is a 0.3 cm left upper lobe lung nodule.   6.  Velcade, Revlimid and dexamethasone between on 08/14/2017 and 04/30/2018. Velcade stopped.   -Revlimid and dexamethasone continued.  -Revlimid held between 12/27/2021 and 03/08/2022.  -Daratumumab added on 11/14/2022.  7. CT chest, abdomen, and pelvis on 12/30/2021 does not reveal any malignancy.    Interval History:  Doing okay. Ongoing chronic vertigo type symptoms, unchanged. Baseline neck pain, improves with NSAID use. Eating and drinking well, hydrating okay. Weight stable. No nausea GI side effects. No rash. Has cough/dyspnea chronically and follows with PCP.       Review of Systems:  A complete review of systems was negative except as noted in the HPI.     Past medical, Surgical, and social history:  Reviewed    Physical  "Examination:  BP (!) 149/86   Pulse 71   Temp 98.5  F (36.9  C)   Resp 16   Ht 1.575 m (5' 2\")   Wt 57.8 kg (127 lb 6.4 oz)   SpO2 96%   BMI 23.30 kg/m    Wt Readings from Last 10 Encounters:   05/01/23 57.8 kg (127 lb 6.4 oz)   04/28/23 57.6 kg (127 lb)   04/03/23 57.7 kg (127 lb 3.2 oz)   03/20/23 59.6 kg (131 lb 6.4 oz)   03/10/23 56.7 kg (125 lb)   02/20/23 57 kg (125 lb 9.6 oz)   02/13/23 58 kg (127 lb 12.8 oz)   02/06/23 58.1 kg (128 lb)   01/23/23 58.2 kg (128 lb 3.2 oz)   01/16/23 57.1 kg (125 lb 12.8 oz)     General: Elderly patient in no acute distress.   Skin: Warm and dry. No abnormal ecchymosis or rashes.  Eyes: Anicteric.   ENT: Poor dentition. Edentulous to upper jaw, only about 6 lower teeth present and appear poor health no surrounding erythema or infectious signs to the soft tissue.   Lungs: Normal work of breathing.  Abdomen: Non-distended.  Extremities: No peripheral edema. Ambulates with some difficulty with walker.  Mental: Calm, cooperative, appropriate. Mood euthymic. Very positive and gracious.  Neuro: Mouth drooling likely due to dentition. Alert and oriented x 3.    Laboratory Data:  CBC, CMP reviewed.       Assessment and Plan:  Quiana Dunaway is a 87 year old year old female with high risk (t14;16) kappa multiple myeloma.     # Multiple Myleoma  - Continue Faspro subcutaneous every 2 weeks, switches to monthly after current cycle  - Continue oral Revlimid (10 mg days 1-14 then off 7) and dex (4 mg PO weekly)  - Continue ASA while on Revlimid  - Continue acyclovir  - Protein studies pending today, but kappa FLC overall downtrending  - Follow-up with Dr. Parry/ROSA MARIA each cycle     Addendum: ANC 0.6, plan to hold off on resuming Revlimid today, recheck in 2 weeks.     # Bone Health  - Will need Zometa however unable to initiate due to likely ongoing/chronic dental issues and possible infections; patient not interested in completing extractions as it is too stressful on her body to " recover    # Diarrhea, Chronic  - Continue Imodium PRN  - Consider fiber supplement due to diet limited to soft foods    # Insomnia  # Decreased Appetite  # Fatigue  - Continue Remeron    # Vertigo  # Dizzines  - Previously met with ENT, neurology consult pending (out to October)  - MRI brain negative 12/2022  - Recommend discussing with the PT that comes to her house today to consider Epley maneuver  - She does use Ativan, Compazine, Remeron which may contribute to dizziness      25 minutes spent on the date of the encounter doing chart review, review of test results, interpretation of tests, patient visit, documentation and discussion with other provider(s)     Vivian Macedo PA-C  Department of Hematology and Oncology  [facility]

## 2023-05-01 NOTE — PROGRESS NOTES
Infusion Nursing Note:  Quiana Dunaway presents today for C6D15 Darzalex faspro.    Patient seen by provider today: Yes: ADELSO Bragg   present during visit today: Not Applicable.    Note: Reviewed labs from today with ADELSO Bragg, orders as follows:    Ok to proceed with darzalex faspro with ANC 0.6  Patient to hold revlimid as directed per Vivian  Continue to hold zometa until patient has received a dental evaluation    Patient verbalizes understanding of above plan.      Intravenous Access:  No Intravenous access/labs at this visit.    Treatment Conditions:  Lab Results   Component Value Date    HGB 10.6 (L) 05/01/2023    WBC 2.7 (L) 05/01/2023    ANEU 1.4 (L) 03/20/2023    ANEUTAUTO 0.6 (L) 05/01/2023     (L) 05/01/2023      Results reviewed, labs did NOT meet treatment parameters: see note above.      Post Infusion Assessment:  Patient tolerated injection without incident.  Site patent and intact, free from redness, edema or discomfort.       Discharge Plan:   Discharge instructions reviewed with: Patient and Family.  Patient and/or family verbalized understanding of discharge instructions and all questions answered.  Copy of AVS reviewed with patient and/or family.  Patient will return 5/15/23 for next appointment.  Patient discharged in stable condition accompanied by: self.  Departure Mode: Ambulatory.  Face to Face time: 10 minutes.      Amanda Howe RN

## 2023-05-01 NOTE — PROGRESS NOTES
"Oncology Rooming Note    May 1, 2023 9:43 AM   Quiana Dunaway is a 87 year old female who presents for:    Chief Complaint   Patient presents with     Oncology Clinic Visit     Initial Vitals: There were no vitals taken for this visit. Estimated body mass index is 23.23 kg/m  as calculated from the following:    Height as of 4/28/23: 1.575 m (5' 2\").    Weight as of 4/28/23: 57.6 kg (127 lb). There is no height or weight on file to calculate BSA.  Data Unavailable Comment: Data Unavailable   No LMP recorded. Patient is postmenopausal.  Allergies reviewed: Yes  Medications reviewed: Yes    Medications: Medication refills not needed today.  Pharmacy name entered into [facility]:    [facility] - [place], [place] - [address]  [id number] - [place], [place] - [address] AT [facility]    Clinical concerns:  PA was notified.      Jacquie Mcdaniel MA            "

## 2023-05-01 NOTE — LETTER
"    5/1/2023         RE: Quiana Dunaway  [address] [facility] [address]        Dear Colleague,    Thank you for referring your patient, Quiana Dunaway, to the [facility]. Please see a copy of my visit note below.    Oncology Rooming Note    May 1, 2023 9:43 AM   Quiana Dunaway is a 87 year old female who presents for:    Chief Complaint   Patient presents with     Oncology Clinic Visit     Initial Vitals: There were no vitals taken for this visit. Estimated body mass index is 23.23 kg/m  as calculated from the following:    Height as of 4/28/23: 1.575 m (5' 2\").    Weight as of 4/28/23: 57.6 kg (127 lb). There is no height or weight on file to calculate BSA.  Data Unavailable Comment: Data Unavailable   No LMP recorded. Patient is postmenopausal.  Allergies reviewed: Yes  Medications reviewed: Yes    Medications: Medication refills not needed today.  Pharmacy name entered into [facility]:    [facility] - [place], [place] - [address]  [id number] - [place], [place] - [address] AT [facility]    Clinical concerns:  PA was notified.      Jacquie Mcdaniel MA              Oncology/Hematology Visit Note  May 1, 2023    Reason for Visit: Follow up of multiple myeloma    Primary Oncologist: Dr. Parry  Oncologic History:  Mrs. Dunaway is a lady with multiple myeloma (IgG kappa). High risk, t(14;16).  1. On 05/01/2017, WBC of 3.4, hemoglobin of 10.2 and platelet of 154.  2. SPEP on 07/07/2017 revealed M-spike of 1.8.  3. Bone marrow biopsy on 07/12/2017 reveals kappa monotypic plasma cell population consistent with multiple myeloma.  Bone marrow is 70% cellular.  Plasma cell is 50-60%.     -There is gain of chromosome 1, 5, 9, 15, and 17.  There is translocation 14;16.   4.  On 07/18/2017:  -M-spike of 1.9.   -Immunofixation reveals monoclonal IgG kappa.    -IgG level of 2970.  IgA of 15 and IgM of 175. " "   -Kappa free light chain of 67.7.  Lambda free light chain of 1.42.  Ratio of kappa to lambda of 47.71.    -Beta 2 microglobulin of 3.4.   5. PET scan on 07/24/2017 reveals several focal areas of increased FDG uptake consistent with multiple myeloma.  There is probable epithelial cyst in tail of the pancreas.  No associated abnormal FDG activity.  Left thyroid cysts or nodules without any FDG activity.  There is a 0.3 cm left upper lobe lung nodule.   6.  Velcade, Revlimid and dexamethasone between on 08/14/2017 and 04/30/2018. Velcade stopped.   -Revlimid and dexamethasone continued.  -Revlimid held between 12/27/2021 and 03/08/2022.  -Daratumumab added on 11/14/2022.  7. CT chest, abdomen, and pelvis on 12/30/2021 does not reveal any malignancy.    Interval History:  Doing okay. Ongoing chronic vertigo type symptoms, unchanged. Baseline neck pain, improves with NSAID use. Eating and drinking well, hydrating okay. Weight stable. No nausea GI side effects. No rash. Has cough/dyspnea chronically and follows with PCP.       Review of Systems:  A complete review of systems was negative except as noted in the HPI.     Past medical, Surgical, and social history:  Reviewed    Physical Examination:  BP (!) 149/86   Pulse 71   Temp 98.5  F (36.9  C)   Resp 16   Ht 1.575 m (5' 2\")   Wt 57.8 kg (127 lb 6.4 oz)   SpO2 96%   BMI 23.30 kg/m    Wt Readings from Last 10 Encounters:   05/01/23 57.8 kg (127 lb 6.4 oz)   04/28/23 57.6 kg (127 lb)   04/03/23 57.7 kg (127 lb 3.2 oz)   03/20/23 59.6 kg (131 lb 6.4 oz)   03/10/23 56.7 kg (125 lb)   02/20/23 57 kg (125 lb 9.6 oz)   02/13/23 58 kg (127 lb 12.8 oz)   02/06/23 58.1 kg (128 lb)   01/23/23 58.2 kg (128 lb 3.2 oz)   01/16/23 57.1 kg (125 lb 12.8 oz)     General: Elderly patient in no acute distress.   Skin: Warm and dry. No abnormal ecchymosis or rashes.  Eyes: Anicteric.   ENT: Poor dentition. Edentulous to upper jaw, only about 6 lower teeth present and appear poor " health no surrounding erythema or infectious signs to the soft tissue.   Lungs: Normal work of breathing.  Abdomen: Non-distended.  Extremities: No peripheral edema. Ambulates with some difficulty with walker.  Mental: Calm, cooperative, appropriate. Mood euthymic. Very positive and gracious.  Neuro: Mouth drooling likely due to dentition. Alert and oriented x 3.    Laboratory Data:  CBC, CMP reviewed.       Assessment and Plan:  Quiana Dunaway is a 87 year old year old female with high risk (t14;16) kappa multiple myeloma.     # Multiple Myleoma  - Continue Faspro subcutaneous every 2 weeks   - Continue oral Revlimid (10 mg days 1-14 then off 7) and dex (4 mg PO weekly)  - Continue ASA while on Revlimid  - Continue acyclovir  - Protein studies pending today, but kappa FLC overall downtrending  - Follow-up with Dr. Parry/ROSA MARIA each cycle     # Bone Health  - Will need Zometa however unable to initiate due to likely ongoing/chronic dental issues and possible infections; patient not interested in completing extractions as it is too stressful on her body to recover    # Diarrhea, Chronic  - Continue Imodium PRN  - Consider fiber supplement due to diet limited to soft foods    # Insomnia  # Decreased Appetite  # Fatigue  - Continue Remeron    # Vertigo  # Dizzines  - Previously met with ENT, neurology consult pending (out to October)  - MRI brain negative 12/2022  - Recommend discussing with the PT that comes to her house today to consider Epley maneuver  - She does use       25 minutes spent on the date of the encounter doing chart review, review of test results, interpretation of tests, patient visit, documentation and discussion with other provider(s)     Vivian Macedo PA-C  Department of Hematology and Oncology  [facility]       Again, thank you for allowing me to participate in the care of your patient.        Sincerely,        VIVIAN MACEDO PA-C

## 2023-05-02 ENCOUNTER — OFFICE VISIT (OUTPATIENT)
Dept: FAMILY MEDICINE | Facility: CLINIC | Age: 88
End: 2023-05-02
Payer: MEDICARE

## 2023-05-02 VITALS
TEMPERATURE: 98.3 F | DIASTOLIC BLOOD PRESSURE: 72 MMHG | RESPIRATION RATE: 16 BRPM | BODY MASS INDEX: 23.37 KG/M2 | SYSTOLIC BLOOD PRESSURE: 138 MMHG | WEIGHT: 127 LBS | HEIGHT: 62 IN | OXYGEN SATURATION: 96 % | HEART RATE: 87 BPM

## 2023-05-02 DIAGNOSIS — C90.00 MULTIPLE MYELOMA NOT HAVING ACHIEVED REMISSION (H): ICD-10-CM

## 2023-05-02 DIAGNOSIS — H81.399 PERIPHERAL VERTIGO, UNSPECIFIED LATERALITY: Primary | ICD-10-CM

## 2023-05-02 PROCEDURE — 99213 OFFICE O/P EST LOW 20 MIN: CPT | Performed by: INTERNAL MEDICINE

## 2023-05-02 ASSESSMENT — PAIN SCALES - GENERAL: PAINLEVEL: MILD PAIN (2)

## 2023-05-02 NOTE — TELEPHONE ENCOUNTER
Seun PT with [facility] calling clinic back regarding order request. Seun stated initial PT visit was done 5/1/2023. Per RN Home Care protocol, RN gave verbal approval of requested PT orders. Seun PT did not have any additional questions or concerns.

## 2023-05-02 NOTE — TELEPHONE ENCOUNTER
Patient Contact    Attempt # 1    Was call answered? No.    Left message for patient to call triage back.    Janice Morton RN

## 2023-05-02 NOTE — PROGRESS NOTES
"  Assessment & Plan     Peripheral vertigo, unspecified laterality  Luckily, this is much better, continue PT activities and use meclozine sparingly as needed for severe symptoms     Multiple myeloma not having achieved remission (H)  Continue follow up with hematology as directed         16 minutes spent by me on the date of the encounter doing chart review, history and exam, documentation and further activities per the note           César Chung MD  [facility] MAGALY Araya is a 87 year old, presenting for the following health issues:  RECHECK (6 wk recheck)         View : No data to display.              History of Present Illness       Reason for visit:  Dizziness    She eats 0-1 servings of fruits and vegetables daily.She exercises with enough effort to increase her heart rate 9 or less minutes per day.  She exercises with enough effort to increase her heart rate 3 or less days per week.   She is taking medications regularly.         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  Physical Exam   Significant improvement, well appearing, in much better spirits                     "

## 2023-05-04 ENCOUNTER — MEDICAL CORRESPONDENCE (OUTPATIENT)
Dept: HEALTH INFORMATION MANAGEMENT | Facility: CLINIC | Age: 88
End: 2023-05-04

## 2023-05-09 ENCOUNTER — MEDICAL CORRESPONDENCE (OUTPATIENT)
Dept: HEALTH INFORMATION MANAGEMENT | Facility: CLINIC | Age: 88
End: 2023-05-09

## 2023-05-10 DIAGNOSIS — R42 DIZZINESS: ICD-10-CM

## 2023-05-10 RX ORDER — MECLIZINE HYDROCHLORIDE 25 MG/1
25 TABLET ORAL 3 TIMES DAILY PRN
Qty: 30 TABLET | Refills: 0 | Status: SHIPPED | OUTPATIENT
Start: 2023-05-10 | End: 2023-06-02

## 2023-05-11 LAB
MDC_IDC_EPISODE_DTM: NORMAL
MDC_IDC_EPISODE_DURATION: 1663 S
MDC_IDC_EPISODE_DURATION: 182 S
MDC_IDC_EPISODE_DURATION: 2 S
MDC_IDC_EPISODE_DURATION: 3 S
MDC_IDC_EPISODE_DURATION: 3 S
MDC_IDC_EPISODE_ID: 48
MDC_IDC_EPISODE_ID: 49
MDC_IDC_EPISODE_ID: 50
MDC_IDC_EPISODE_ID: 51
MDC_IDC_EPISODE_ID: 52
MDC_IDC_EPISODE_TYPE: NORMAL
MDC_IDC_LEAD_IMPLANT_DT: NORMAL
MDC_IDC_LEAD_IMPLANT_DT: NORMAL
MDC_IDC_LEAD_LOCATION: NORMAL
MDC_IDC_LEAD_LOCATION: NORMAL
MDC_IDC_LEAD_LOCATION_DETAIL_1: NORMAL
MDC_IDC_LEAD_LOCATION_DETAIL_1: NORMAL
MDC_IDC_LEAD_MFG: NORMAL
MDC_IDC_LEAD_MFG: NORMAL
MDC_IDC_LEAD_MODEL: NORMAL
MDC_IDC_LEAD_MODEL: NORMAL
MDC_IDC_LEAD_POLARITY_TYPE: NORMAL
MDC_IDC_LEAD_POLARITY_TYPE: NORMAL
MDC_IDC_LEAD_SERIAL: NORMAL
MDC_IDC_LEAD_SERIAL: NORMAL
MDC_IDC_MSMT_BATTERY_DTM: NORMAL
MDC_IDC_MSMT_BATTERY_REMAINING_LONGEVITY: 21 MO
MDC_IDC_MSMT_BATTERY_RRT_TRIGGER: 2.83
MDC_IDC_MSMT_BATTERY_STATUS: NORMAL
MDC_IDC_MSMT_BATTERY_VOLTAGE: 2.94 V
MDC_IDC_MSMT_LEADCHNL_RA_IMPEDANCE_VALUE: 342 OHM
MDC_IDC_MSMT_LEADCHNL_RA_IMPEDANCE_VALUE: 342 OHM
MDC_IDC_MSMT_LEADCHNL_RA_PACING_THRESHOLD_AMPLITUDE: 0.38 V
MDC_IDC_MSMT_LEADCHNL_RA_PACING_THRESHOLD_PULSEWIDTH: 0.4 MS
MDC_IDC_MSMT_LEADCHNL_RA_SENSING_INTR_AMPL: 2.12 MV
MDC_IDC_MSMT_LEADCHNL_RA_SENSING_INTR_AMPL: 2.25 MV
MDC_IDC_MSMT_LEADCHNL_RV_IMPEDANCE_VALUE: 380 OHM
MDC_IDC_MSMT_LEADCHNL_RV_IMPEDANCE_VALUE: 494 OHM
MDC_IDC_MSMT_LEADCHNL_RV_PACING_THRESHOLD_AMPLITUDE: 2.38 V
MDC_IDC_MSMT_LEADCHNL_RV_PACING_THRESHOLD_PULSEWIDTH: 0.4 MS
MDC_IDC_MSMT_LEADCHNL_RV_SENSING_INTR_AMPL: 6 MV
MDC_IDC_MSMT_LEADCHNL_RV_SENSING_INTR_AMPL: 6 MV
MDC_IDC_PG_IMPLANT_DTM: NORMAL
MDC_IDC_PG_MFG: NORMAL
MDC_IDC_PG_MODEL: NORMAL
MDC_IDC_PG_SERIAL: NORMAL
MDC_IDC_PG_TYPE: NORMAL
MDC_IDC_SESS_CLINIC_NAME: NORMAL
MDC_IDC_SESS_DTM: NORMAL
MDC_IDC_SESS_TYPE: NORMAL
MDC_IDC_SET_BRADY_AT_MODE_SWITCH_RATE: 171 {BEATS}/MIN
MDC_IDC_SET_BRADY_HYSTRATE: NORMAL
MDC_IDC_SET_BRADY_LOWRATE: 60 {BEATS}/MIN
MDC_IDC_SET_BRADY_MAX_SENSOR_RATE: 120 {BEATS}/MIN
MDC_IDC_SET_BRADY_MAX_TRACKING_RATE: 120 {BEATS}/MIN
MDC_IDC_SET_BRADY_MODE: NORMAL
MDC_IDC_SET_BRADY_PAV_DELAY_LOW: 300 MS
MDC_IDC_SET_BRADY_SAV_DELAY_LOW: 300 MS
MDC_IDC_SET_LEADCHNL_RA_PACING_AMPLITUDE: 1.5 V
MDC_IDC_SET_LEADCHNL_RA_PACING_ANODE_ELECTRODE_1: NORMAL
MDC_IDC_SET_LEADCHNL_RA_PACING_ANODE_LOCATION_1: NORMAL
MDC_IDC_SET_LEADCHNL_RA_PACING_CAPTURE_MODE: NORMAL
MDC_IDC_SET_LEADCHNL_RA_PACING_CATHODE_ELECTRODE_1: NORMAL
MDC_IDC_SET_LEADCHNL_RA_PACING_CATHODE_LOCATION_1: NORMAL
MDC_IDC_SET_LEADCHNL_RA_PACING_POLARITY: NORMAL
MDC_IDC_SET_LEADCHNL_RA_PACING_PULSEWIDTH: 0.4 MS
MDC_IDC_SET_LEADCHNL_RA_SENSING_ANODE_ELECTRODE_1: NORMAL
MDC_IDC_SET_LEADCHNL_RA_SENSING_ANODE_LOCATION_1: NORMAL
MDC_IDC_SET_LEADCHNL_RA_SENSING_CATHODE_ELECTRODE_1: NORMAL
MDC_IDC_SET_LEADCHNL_RA_SENSING_CATHODE_LOCATION_1: NORMAL
MDC_IDC_SET_LEADCHNL_RA_SENSING_POLARITY: NORMAL
MDC_IDC_SET_LEADCHNL_RA_SENSING_SENSITIVITY: 0.3 MV
MDC_IDC_SET_LEADCHNL_RV_PACING_AMPLITUDE: 4.75 V
MDC_IDC_SET_LEADCHNL_RV_PACING_ANODE_ELECTRODE_1: NORMAL
MDC_IDC_SET_LEADCHNL_RV_PACING_ANODE_LOCATION_1: NORMAL
MDC_IDC_SET_LEADCHNL_RV_PACING_CAPTURE_MODE: NORMAL
MDC_IDC_SET_LEADCHNL_RV_PACING_CATHODE_ELECTRODE_1: NORMAL
MDC_IDC_SET_LEADCHNL_RV_PACING_CATHODE_LOCATION_1: NORMAL
MDC_IDC_SET_LEADCHNL_RV_PACING_POLARITY: NORMAL
MDC_IDC_SET_LEADCHNL_RV_PACING_PULSEWIDTH: 0.4 MS
MDC_IDC_SET_LEADCHNL_RV_SENSING_ANODE_ELECTRODE_1: NORMAL
MDC_IDC_SET_LEADCHNL_RV_SENSING_ANODE_LOCATION_1: NORMAL
MDC_IDC_SET_LEADCHNL_RV_SENSING_CATHODE_ELECTRODE_1: NORMAL
MDC_IDC_SET_LEADCHNL_RV_SENSING_CATHODE_LOCATION_1: NORMAL
MDC_IDC_SET_LEADCHNL_RV_SENSING_POLARITY: NORMAL
MDC_IDC_SET_LEADCHNL_RV_SENSING_SENSITIVITY: 0.9 MV
MDC_IDC_SET_ZONE_DETECTION_INTERVAL: 350 MS
MDC_IDC_SET_ZONE_DETECTION_INTERVAL: 400 MS
MDC_IDC_SET_ZONE_TYPE: NORMAL
MDC_IDC_STAT_AT_BURDEN_PERCENT: 0 %
MDC_IDC_STAT_AT_DTM_END: NORMAL
MDC_IDC_STAT_AT_DTM_START: NORMAL
MDC_IDC_STAT_BRADY_AP_VP_PERCENT: 84.46 %
MDC_IDC_STAT_BRADY_AP_VS_PERCENT: 0.02 %
MDC_IDC_STAT_BRADY_AS_VP_PERCENT: 15.51 %
MDC_IDC_STAT_BRADY_AS_VS_PERCENT: 0.01 %
MDC_IDC_STAT_BRADY_DTM_END: NORMAL
MDC_IDC_STAT_BRADY_DTM_START: NORMAL
MDC_IDC_STAT_BRADY_RA_PERCENT_PACED: 84.44 %
MDC_IDC_STAT_BRADY_RV_PERCENT_PACED: 99.97 %
MDC_IDC_STAT_EPISODE_RECENT_COUNT: 0
MDC_IDC_STAT_EPISODE_RECENT_COUNT: 0
MDC_IDC_STAT_EPISODE_RECENT_COUNT: 2
MDC_IDC_STAT_EPISODE_RECENT_COUNT: 3
MDC_IDC_STAT_EPISODE_RECENT_COUNT_DTM_END: NORMAL
MDC_IDC_STAT_EPISODE_RECENT_COUNT_DTM_START: NORMAL
MDC_IDC_STAT_EPISODE_TOTAL_COUNT: 0
MDC_IDC_STAT_EPISODE_TOTAL_COUNT: 0
MDC_IDC_STAT_EPISODE_TOTAL_COUNT: 14
MDC_IDC_STAT_EPISODE_TOTAL_COUNT: 35
MDC_IDC_STAT_EPISODE_TOTAL_COUNT_DTM_END: NORMAL
MDC_IDC_STAT_EPISODE_TOTAL_COUNT_DTM_START: NORMAL
MDC_IDC_STAT_EPISODE_TYPE: NORMAL

## 2023-05-15 ENCOUNTER — ONCOLOGY VISIT (OUTPATIENT)
Dept: ONCOLOGY | Facility: CLINIC | Age: 88
End: 2023-05-15
Attending: INTERNAL MEDICINE
Payer: MEDICARE

## 2023-05-15 ENCOUNTER — INFUSION THERAPY VISIT (OUTPATIENT)
Dept: INFUSION THERAPY | Facility: CLINIC | Age: 88
End: 2023-05-15
Attending: INTERNAL MEDICINE
Payer: MEDICARE

## 2023-05-15 VITALS
TEMPERATURE: 98.3 F | HEART RATE: 85 BPM | DIASTOLIC BLOOD PRESSURE: 85 MMHG | SYSTOLIC BLOOD PRESSURE: 138 MMHG | RESPIRATION RATE: 16 BRPM | OXYGEN SATURATION: 96 %

## 2023-05-15 VITALS
DIASTOLIC BLOOD PRESSURE: 79 MMHG | SYSTOLIC BLOOD PRESSURE: 148 MMHG | WEIGHT: 130 LBS | HEART RATE: 83 BPM | RESPIRATION RATE: 16 BRPM | BODY MASS INDEX: 23.92 KG/M2 | HEIGHT: 62 IN | OXYGEN SATURATION: 95 % | TEMPERATURE: 97.9 F

## 2023-05-15 DIAGNOSIS — C90.00 MULTIPLE MYELOMA NOT HAVING ACHIEVED REMISSION (H): Primary | ICD-10-CM

## 2023-05-15 LAB
BASOPHILS # BLD AUTO: 0 10E3/UL (ref 0–0.2)
BASOPHILS NFR BLD AUTO: 1 %
EOSINOPHIL # BLD AUTO: 0.1 10E3/UL (ref 0–0.7)
EOSINOPHIL NFR BLD AUTO: 2 %
ERYTHROCYTE [DISTWIDTH] IN BLOOD BY AUTOMATED COUNT: 13.8 % (ref 10–15)
HCT VFR BLD AUTO: 31.7 % (ref 35–47)
HGB BLD-MCNC: 10.8 G/DL (ref 11.7–15.7)
IMM GRANULOCYTES # BLD: 0 10E3/UL
IMM GRANULOCYTES NFR BLD: 1 %
LYMPHOCYTES # BLD AUTO: 1.6 10E3/UL (ref 0.8–5.3)
LYMPHOCYTES NFR BLD AUTO: 40 %
MCH RBC QN AUTO: 31.5 PG (ref 26.5–33)
MCHC RBC AUTO-ENTMCNC: 34.1 G/DL (ref 31.5–36.5)
MCV RBC AUTO: 92 FL (ref 78–100)
MONOCYTES # BLD AUTO: 0.6 10E3/UL (ref 0–1.3)
MONOCYTES NFR BLD AUTO: 15 %
NEUTROPHILS # BLD AUTO: 1.7 10E3/UL (ref 1.6–8.3)
NEUTROPHILS NFR BLD AUTO: 41 %
NRBC # BLD AUTO: 0 10E3/UL
NRBC BLD AUTO-RTO: 0 /100
PLATELET # BLD AUTO: 199 10E3/UL (ref 150–450)
RBC # BLD AUTO: 3.43 10E6/UL (ref 3.8–5.2)
TOTAL PROTEIN SERUM FOR ELP: 6.1 G/DL (ref 6.4–8.3)
WBC # BLD AUTO: 4.1 10E3/UL (ref 4–11)

## 2023-05-15 PROCEDURE — 84165 PROTEIN E-PHORESIS SERUM: CPT | Mod: TC | Performed by: PATHOLOGY

## 2023-05-15 PROCEDURE — 84165 PROTEIN E-PHORESIS SERUM: CPT | Mod: 26

## 2023-05-15 PROCEDURE — 36415 COLL VENOUS BLD VENIPUNCTURE: CPT | Performed by: INTERNAL MEDICINE

## 2023-05-15 PROCEDURE — 96401 CHEMO ANTI-NEOPL SQ/IM: CPT

## 2023-05-15 PROCEDURE — 82784 ASSAY IGA/IGD/IGG/IGM EACH: CPT | Performed by: INTERNAL MEDICINE

## 2023-05-15 PROCEDURE — 84155 ASSAY OF PROTEIN SERUM: CPT | Performed by: INTERNAL MEDICINE

## 2023-05-15 PROCEDURE — 99213 OFFICE O/P EST LOW 20 MIN: CPT | Performed by: NURSE PRACTITIONER

## 2023-05-15 PROCEDURE — G0463 HOSPITAL OUTPT CLINIC VISIT: HCPCS | Mod: 25 | Performed by: NURSE PRACTITIONER

## 2023-05-15 PROCEDURE — 85025 COMPLETE CBC W/AUTO DIFF WBC: CPT | Performed by: INTERNAL MEDICINE

## 2023-05-15 PROCEDURE — 83521 IG LIGHT CHAINS FREE EACH: CPT | Mod: 59 | Performed by: INTERNAL MEDICINE

## 2023-05-15 PROCEDURE — 250N000011 HC RX IP 250 OP 636: Performed by: NURSE PRACTITIONER

## 2023-05-15 RX ORDER — HEPARIN SODIUM (PORCINE) LOCK FLUSH IV SOLN 100 UNIT/ML 100 UNIT/ML
5 SOLUTION INTRAVENOUS
Status: CANCELLED | OUTPATIENT
Start: 2023-05-15

## 2023-05-15 RX ORDER — ALBUTEROL SULFATE 90 UG/1
1-2 AEROSOL, METERED RESPIRATORY (INHALATION)
Status: CANCELLED
Start: 2023-05-15

## 2023-05-15 RX ORDER — EPINEPHRINE 1 MG/ML
0.3 INJECTION, SOLUTION INTRAMUSCULAR; SUBCUTANEOUS EVERY 5 MIN PRN
Status: CANCELLED | OUTPATIENT
Start: 2023-05-15

## 2023-05-15 RX ORDER — MEPERIDINE HYDROCHLORIDE 25 MG/ML
25 INJECTION INTRAMUSCULAR; INTRAVENOUS; SUBCUTANEOUS EVERY 30 MIN PRN
Status: CANCELLED | OUTPATIENT
Start: 2023-05-15

## 2023-05-15 RX ORDER — DEXAMETHASONE 4 MG/1
12 TABLET ORAL
Status: CANCELLED | OUTPATIENT
Start: 2023-05-15

## 2023-05-15 RX ORDER — DIPHENHYDRAMINE HYDROCHLORIDE 50 MG/ML
50 INJECTION INTRAMUSCULAR; INTRAVENOUS
Status: CANCELLED
Start: 2023-05-15

## 2023-05-15 RX ORDER — DIPHENHYDRAMINE HCL 25 MG
50 CAPSULE ORAL
Status: CANCELLED | OUTPATIENT
Start: 2023-05-15

## 2023-05-15 RX ORDER — LORAZEPAM 2 MG/ML
0.5 INJECTION INTRAMUSCULAR EVERY 4 HOURS PRN
Status: CANCELLED | OUTPATIENT
Start: 2023-05-15

## 2023-05-15 RX ORDER — METHYLPREDNISOLONE SODIUM SUCCINATE 125 MG/2ML
125 INJECTION, POWDER, LYOPHILIZED, FOR SOLUTION INTRAMUSCULAR; INTRAVENOUS
Status: CANCELLED
Start: 2023-05-15

## 2023-05-15 RX ORDER — ACETAMINOPHEN 325 MG/1
650 TABLET ORAL
Status: CANCELLED | OUTPATIENT
Start: 2023-05-15

## 2023-05-15 RX ORDER — HEPARIN SODIUM,PORCINE 10 UNIT/ML
5 VIAL (ML) INTRAVENOUS
Status: CANCELLED | OUTPATIENT
Start: 2023-05-15

## 2023-05-15 RX ORDER — ALBUTEROL SULFATE 0.83 MG/ML
2.5 SOLUTION RESPIRATORY (INHALATION)
Status: CANCELLED | OUTPATIENT
Start: 2023-05-15

## 2023-05-15 RX ADMIN — DARATUMUMAB AND HYALURONIDASE-FIHJ (HUMAN RECOMBINANT) 1800 MG: 1800; 30000 INJECTION SUBCUTANEOUS at 10:33

## 2023-05-15 ASSESSMENT — PAIN SCALES - GENERAL
PAINLEVEL: NO PAIN (0)
PAINLEVEL: NO PAIN (0)

## 2023-05-15 NOTE — LETTER
"    5/15/2023         RE: Quiana Dunaway  [address] [facility] [address]        Dear Colleague,    Thank you for referring your patient, Quiana Dunaway, to the [facility]. Please see a copy of my visit note below.    Oncology Rooming Note    May 15, 2023 9:35 AM   Quiana Dunaway is a 87 year old female who presents for:    Chief Complaint   Patient presents with     Oncology Clinic Visit     Initial Vitals: There were no vitals taken for this visit. Estimated body mass index is 23.23 kg/m  as calculated from the following:    Height as of 5/2/23: 1.575 m (5' 2\").    Weight as of 5/2/23: 57.6 kg (127 lb). There is no height or weight on file to calculate BSA.  Data Unavailable Comment: Data Unavailable   No LMP recorded. Patient is postmenopausal.  Allergies reviewed: Yes  Medications reviewed: Yes    Medications: Medication refills not needed today.  Pharmacy name entered into [facility]:    [facility] - [place], [place] - [address]  [id number] - [place], [place] - [address] AT [facility]    Clinical concerns:  NP was notified.      Jacquie Mcdaniel MA              Oncology/Hematology Visit Note  May 15, 2023    Reason for Visit: follow up of multiple myeloma  Patient Dr. Parry  Currently getting treatment with treatment with daratumumab Revlimid and dexamethasone      Interval History:  Denies fever chills sweats cough shortness of breath chest pain nausea vomiting diarrhea abdominal pain or bleeding.      Review of Systems:  14 point ROS of systems including Constitutional, Eyes, Respiratory, Cardiovascular, Gastroenterology, Genitourinary, Integumentary, Muscularskeletal, Psychiatric were all negative except for pertinent positives noted in my HPI.    Physical Examination:  General: The patient is a pleasant female in no acute distress.  BP (!) 148/79   Pulse 83   Temp 97.9  F (36.6  C)   Resp " "16   Ht 1.575 m (5' 2\")   Wt 59 kg (130 lb)   SpO2 95%   BMI 23.78 kg/m    HEENT: EOMI, PERRL. Sclerae are anicteric. Oral mucosa is pink and moist with no lesions or thrush.   Lymph: Neck is supple with no lymphadenopathy in the cervical or supraclavicular areas.   Heart: Regular rate and rhythm.   Lungs: Clear to auscultation bilaterally.   GI: Bowel sounds present, soft, nontender with no palpable hepatosplenomegaly or masses.   Extremities: No lower extremity edema noted bilaterally.   Skin: No rashes, petechiae, or bruising noted on exposed skin.  Right ingrown nail-redness swelling noted    LABS  -CBC CMP results reviewed    Assessment and Plan:    Multiple myeloma  Currently on Revlimid daratumumab and dexamethasone  Labs reviewed  Continue with Revlimid 10 mg- 2 weeks on 1 week off  Continue with dexamethasone 4 mg p.o. weekly  Continue with daratumumab      Prophylaxis  Continue with aspirin and acyclovir    Bone health  Continue to hold Zometa until dental work-up is done      Anemia  Patient denies bleeding  Overall stable      Call our clinic with any changes in health condition questions    MARIUSZ Mckeon CNP  [facility]     Chart documentation with Dragon Voice recognition Software. Although reviewed after completion, some words and grammatical errors may remain.            Again, thank you for allowing me to participate in the care of your patient.        Sincerely,        MARIUSZ Mckeon CNP    "

## 2023-05-15 NOTE — PROGRESS NOTES
"Oncology/Hematology Visit Note  May 15, 2023    Reason for Visit: follow up of multiple myeloma  Patient Dr. Parry  Currently getting treatment with treatment with daratumumab Revlimid and dexamethasone      Interval History:  Denies fever chills sweats cough shortness of breath chest pain nausea vomiting diarrhea abdominal pain or bleeding.      Review of Systems:  14 point ROS of systems including Constitutional, Eyes, Respiratory, Cardiovascular, Gastroenterology, Genitourinary, Integumentary, Muscularskeletal, Psychiatric were all negative except for pertinent positives noted in my HPI.    Physical Examination:  General: The patient is a pleasant female in no acute distress.  BP (!) 148/79   Pulse 83   Temp 97.9  F (36.6  C)   Resp 16   Ht 1.575 m (5' 2\")   Wt 59 kg (130 lb)   SpO2 95%   BMI 23.78 kg/m    HEENT: EOMI, PERRL. Sclerae are anicteric. Oral mucosa is pink and moist with no lesions or thrush.   Lymph: Neck is supple with no lymphadenopathy in the cervical or supraclavicular areas.   Heart: Regular rate and rhythm.   Lungs: Clear to auscultation bilaterally.   GI: Bowel sounds present, soft, nontender with no palpable hepatosplenomegaly or masses.   Extremities: No lower extremity edema noted bilaterally.   Skin: No rashes, petechiae, or bruising noted on exposed skin.  Right ingrown nail-redness swelling noted    LABS  -CBC CMP results reviewed    Assessment and Plan:    Multiple myeloma  Currently on Revlimid daratumumab and dexamethasone  Labs reviewed  Continue with Revlimid 10 mg- 2 weeks on 1 week off  Continue with dexamethasone 4 mg p.o. weekly  Continue with daratumumab      Prophylaxis  Continue with aspirin and acyclovir    Bone health  Continue to hold Zometa until dental work-up is done      Anemia  Patient denies bleeding  Overall stable      Call our clinic with any changes in health condition questions    MARIUSZ Mckeon [facility]     Chart " documentation with Dragon Voice recognition Software. Although reviewed after completion, some words and grammatical errors may remain.

## 2023-05-15 NOTE — PROGRESS NOTES
Infusion Nursing Note:  Quiana ANA Dunaway presents today for C7D1 Darzalex Faspro.    Patient seen by provider today: Yes: Juaquin Hammond NP   present during visit today: Not Applicable.    Note: N/A.      Intravenous Access:  No Intravenous access/labs at this visit.    Treatment Conditions:  Lab Results   Component Value Date    HGB 10.8 (L) 05/15/2023    WBC 4.1 05/15/2023    ANEU 1.4 (L) 03/20/2023    ANEUTAUTO 1.7 05/15/2023     05/15/2023      Results reviewed, labs MET treatment parameters, ok to proceed with treatment.      Post Infusion Assessment:  Patient tolerated injection without incident.  Site patent and intact, free from redness, edema or discomfort.  No evidence of extravasations.  Access discontinued per protocol.       Discharge Plan:   Discharge instructions reviewed with: Patient.  Patient and/or family verbalized understanding of discharge instructions and all questions answered.  AVS to patient via [employer].  Patient will return 6/12/23 for next appointment.   Patient discharged in stable condition accompanied by: daughter.  Departure Mode: Ambulatory with personal walker.      Carmen Hein RN

## 2023-05-15 NOTE — PROGRESS NOTES
"Oncology Rooming Note    May 15, 2023 9:35 AM   Quiana Dunaway is a 87 year old female who presents for:    Chief Complaint   Patient presents with     Oncology Clinic Visit     Initial Vitals: There were no vitals taken for this visit. Estimated body mass index is 23.23 kg/m  as calculated from the following:    Height as of 5/2/23: 1.575 m (5' 2\").    Weight as of 5/2/23: 57.6 kg (127 lb). There is no height or weight on file to calculate BSA.  Data Unavailable Comment: Data Unavailable   No LMP recorded. Patient is postmenopausal.  Allergies reviewed: Yes  Medications reviewed: Yes    Medications: Medication refills not needed today.  Pharmacy name entered into [facility]:    [facility] - [place], [place] - [address]  [id number] - [place], [place] - [address] [facility] [place]    Clinical concerns:  NP was notified.      Jacquie Mcdaniel MA            "

## 2023-05-16 LAB
IGG SERPL-MCNC: 1062 MG/DL (ref 610–1616)
KAPPA LC FREE SER-MCNC: 75.99 MG/DL (ref 0.33–1.94)
KAPPA LC FREE/LAMBDA FREE SER NEPH: 330.39 {RATIO} (ref 0.26–1.65)
LAMBDA LC FREE SERPL-MCNC: 0.23 MG/DL (ref 0.57–2.63)

## 2023-05-17 LAB
ALBUMIN SERPL ELPH-MCNC: 3.7 G/DL (ref 3.7–5.1)
ALPHA1 GLOB SERPL ELPH-MCNC: 0.3 G/DL (ref 0.2–0.4)
ALPHA2 GLOB SERPL ELPH-MCNC: 0.6 G/DL (ref 0.5–0.9)
B-GLOBULIN SERPL ELPH-MCNC: 0.5 G/DL (ref 0.6–1)
GAMMA GLOB SERPL ELPH-MCNC: 0.9 G/DL (ref 0.7–1.6)
M PROTEIN SERPL ELPH-MCNC: 0.6 G/DL
PROT PATTERN SERPL ELPH-IMP: ABNORMAL

## 2023-05-17 NOTE — RESULT ENCOUNTER NOTE
Dear Ms. Dunaway,    We will discuss this during appointment.    Please, call me with any questions.    Alex Parry MD

## 2023-05-18 ENCOUNTER — TELEPHONE (OUTPATIENT)
Dept: ONCOLOGY | Facility: CLINIC | Age: 88
End: 2023-05-18
Payer: MEDICARE

## 2023-05-18 NOTE — TELEPHONE ENCOUNTER
Writer called and spoke with patient's daughter , Jackie, and scheduled follow up appointment on 5/24 to discuss further plan of care.     Alecia Briggs RN

## 2023-05-18 NOTE — TELEPHONE ENCOUNTER
----- Message from Alex Parry MD sent at 5/17/2023  4:39 PM CDT -----  Regarding: RE: multiple myeloma labs  I can see her sooner.    bk  ----- Message -----  From: Alecia Briggs RN  Sent: 5/17/2023   3:55 PM CDT  To: Alex Parry MD  Subject: multiple myeloma labs                            Hi Dr. Parry,     You reviewed her labs stating will discuss at appointment. The lab values have changed wondering if current appointment on 6/12 patient has with you is appropriate.    Thank you,  Petra

## 2023-05-24 ENCOUNTER — TELEPHONE (OUTPATIENT)
Dept: PHARMACY | Facility: CLINIC | Age: 88
End: 2023-05-24

## 2023-05-24 ENCOUNTER — VIRTUAL VISIT (OUTPATIENT)
Dept: ONCOLOGY | Facility: CLINIC | Age: 88
End: 2023-05-24
Attending: INTERNAL MEDICINE
Payer: MEDICARE

## 2023-05-24 DIAGNOSIS — C90.00 MULTIPLE MYELOMA NOT HAVING ACHIEVED REMISSION (H): Primary | ICD-10-CM

## 2023-05-24 PROCEDURE — 99214 OFFICE O/P EST MOD 30 MIN: CPT | Mod: 95 | Performed by: INTERNAL MEDICINE

## 2023-05-24 RX ORDER — ALBUTEROL SULFATE 0.83 MG/ML
2.5 SOLUTION RESPIRATORY (INHALATION)
Status: CANCELLED | OUTPATIENT
Start: 2023-06-12

## 2023-05-24 RX ORDER — DIPHENHYDRAMINE HCL 25 MG
50 CAPSULE ORAL
Status: CANCELLED | OUTPATIENT
Start: 2023-06-12

## 2023-05-24 RX ORDER — DIPHENHYDRAMINE HYDROCHLORIDE 50 MG/ML
50 INJECTION INTRAMUSCULAR; INTRAVENOUS
Status: CANCELLED
Start: 2023-06-12

## 2023-05-24 RX ORDER — ACETAMINOPHEN 325 MG/1
650 TABLET ORAL
Status: CANCELLED | OUTPATIENT
Start: 2023-06-12

## 2023-05-24 RX ORDER — EPINEPHRINE 1 MG/ML
0.3 INJECTION, SOLUTION INTRAMUSCULAR; SUBCUTANEOUS EVERY 5 MIN PRN
Status: CANCELLED | OUTPATIENT
Start: 2023-06-12

## 2023-05-24 RX ORDER — METHYLPREDNISOLONE SODIUM SUCCINATE 125 MG/2ML
125 INJECTION, POWDER, LYOPHILIZED, FOR SOLUTION INTRAMUSCULAR; INTRAVENOUS
Status: CANCELLED
Start: 2023-06-12

## 2023-05-24 RX ORDER — DEXAMETHASONE 4 MG/1
12 TABLET ORAL
Status: CANCELLED | OUTPATIENT
Start: 2023-06-12

## 2023-05-24 RX ORDER — HEPARIN SODIUM,PORCINE 10 UNIT/ML
5 VIAL (ML) INTRAVENOUS
Status: CANCELLED | OUTPATIENT
Start: 2023-06-12

## 2023-05-24 RX ORDER — LORAZEPAM 2 MG/ML
0.5 INJECTION INTRAMUSCULAR EVERY 4 HOURS PRN
Status: CANCELLED | OUTPATIENT
Start: 2023-06-12

## 2023-05-24 RX ORDER — HEPARIN SODIUM (PORCINE) LOCK FLUSH IV SOLN 100 UNIT/ML 100 UNIT/ML
5 SOLUTION INTRAVENOUS
Status: CANCELLED | OUTPATIENT
Start: 2023-06-12

## 2023-05-24 RX ORDER — ALBUTEROL SULFATE 90 UG/1
1-2 AEROSOL, METERED RESPIRATORY (INHALATION)
Status: CANCELLED
Start: 2023-06-12

## 2023-05-24 RX ORDER — MEPERIDINE HYDROCHLORIDE 25 MG/ML
25 INJECTION INTRAMUSCULAR; INTRAVENOUS; SUBCUTANEOUS EVERY 30 MIN PRN
Status: CANCELLED | OUTPATIENT
Start: 2023-06-12

## 2023-05-24 NOTE — TELEPHONE ENCOUNTER
PA Initiation    Medication: POMALYST 4 MG PO CAPS  Insurance Company: [employer] Clinical Review - Phone [phone] Fax [phone]  Pharmacy Filling the Rx: [facility] - [place], [place] - [facility] [address]  Filling Pharmacy Phone:    Filling Pharmacy Fax:    Start Date: 5/24/2023

## 2023-05-24 NOTE — NURSING NOTE
Is the patient currently in the state of [place]? YES    Visit mode:VIDEO    If the visit is dropped, the patient can be reconnected by: VIDEO VISIT: Text to cell phone: [phone]    Will anyone else be joining the visit? NO      How would you like to obtain your AVS? MyChart    Are changes needed to the allergy or medication list? NO    Reason for visit: MENDOZA THOMAS, VF

## 2023-05-24 NOTE — PROGRESS NOTES
HEMATOLOGY HISTORY: Mrs. Dunaway is a lady with multiple myeloma (IgG kappa). High risk, t(14;16).  1. On 05/01/2017, WBC of 3.4, hemoglobin of 10.2 and platelet of 154.  2. SPEP on 07/07/2017 revealed M-spike of 1.8.  3. Bone marrow biopsy on 07/12/2017 reveals kappa monotypic plasma cell population consistent with multiple myeloma.  Bone marrow is 70% cellular.  Plasma cell is 50-60%.     -There is gain of chromosome 1, 5, 9, 15, and 17.  There is translocation 14;16.   4.  On 07/18/2017:  -M-spike of 1.9.   -Immunofixation reveals monoclonal IgG kappa.    -IgG level of 2970.  IgA of 15 and IgM of 175.    -Kappa free light chain of 67.7.  Lambda free light chain of 1.42.  Ratio of kappa to lambda of 47.71.    -Beta 2 microglobulin of 3.4.   5. PET scan on 07/24/2017 reveals several focal areas of increased FDG uptake consistent with multiple myeloma.  There is probable epithelial cyst in tail of the pancreas.  No associated abnormal FDG activity.  Left thyroid cysts or nodules without any FDG activity.  There is a 0.3 cm left upper lobe lung nodule.   6.  Velcade, Revlimid and dexamethasone between on 08/14/2017 and 04/30/2018. Velcade stopped.   -Revlimid and dexamethasone continued.  -Revlimid held between 12/27/2021 and 03/08/2022.  -Daratumumab added on 11/14/2022.  7. CT chest, abdomen, and pelvis on 12/30/2021 does not reveal any malignancy.  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     SUBJECTIVE:  Ms. Dunaway is an 87-year-old female with multiple myeloma on daratumumab, revlimid and dexamethasone. She is tolerating it well.      Zometa is on hold for dental work.    Labs on 05/15/2023 revealed increase in kappa free light chain to 75.99.  It was 38.1 on 04/17/2023.    Patient's overall condition is stable.  Patient has been having dizziness. Previous brain MRI did not reveal any acute abnormality. Patient has been evaluated by ENT.    She takes meclizine as needed.    No  headache.  No chest pain.  No shortness of breath at rest.  Gets short of breath on exertion.  No abdominal pain.  No nausea or vomiting.  Appetite is fairly good.  No urinary complaints.  No bowel problem.  She has generalized weakness.  It is not getting worse. All other review of system negative.      PHYSICAL EXAMINATION:   GENERAL:  Alert and oriented x 3.   Rest of the systems not examined as this is a video visit.     LABORATORY: CBC, SPEP, IgG and free light chain done on 05/15/2023 reviewed.      ASSESSMENT:    1.  An 87-year-old female with multiple myeloma on Revlimid, daratumumab and dexamethasone.  Disease is  progressing.  2.  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    3.  Dizziness.  4.  Generalized weakness.     PLAN:    1.  Labs were reviewed with the patient and her family.  Explained to them that kappa free light chain is increasing.  Myeloma is progressing.    2.  Discussed regarding treatment for multiple myeloma.  Discussed regarding changing the treatment as disease is progressing.    After discussion, plan is to stop Revlimid and start pomalidomide.  She will continue on daratumumab and dexamethasone.  Side effect of pomalidomide discussed.  She is agreeable for it.  Pomalidomide will be started with next cycle of daratumumab.    3.  We will continue to hold the Zometa.  Once cleared by dentist, Zometa will be resumed.    4.  She continues to have dizziness.  Advised her to be careful and avoid fall.    5.  She and her family had few questions which were all answered.  I will see her in mid July.  In between she will see our nurse practitioner.     TOTAL VISIT TIME: 30 minutes.  Time spent in today's visit, review of chart/investigations today, monitoring for toxicity of high risk medications and documentation today.

## 2023-05-24 NOTE — LETTER
5/24/2023         RE: Quiana Dunaway  [address] [facility] [address]        Dear Colleague,    Thank you for referring your patient, Quiana Dunaway, to the [facility] [place]. Please see a copy of my visit note below.    Virtual Visit Details    Type of service:  Video Visit     Originating Location (pt. Location): Home    Distant Location (provider location):  On-site  Platform used for Video Visit: [facility]    HEMATOLOGY HISTORY: Mrs. Dunaway is a lady with multiple myeloma (IgG kappa). High risk, t(14;16).  1. On 05/01/2017, WBC of 3.4, hemoglobin of 10.2 and platelet of 154.  2. SPEP on 07/07/2017 revealed M-spike of 1.8.  3. Bone marrow biopsy on 07/12/2017 reveals kappa monotypic plasma cell population consistent with multiple myeloma.  Bone marrow is 70% cellular.  Plasma cell is 50-60%.     -There is gain of chromosome 1, 5, 9, 15, and 17.  There is translocation 14;16.   4.  On 07/18/2017:  -M-spike of 1.9.   -Immunofixation reveals monoclonal IgG kappa.    -IgG level of 2970.  IgA of 15 and IgM of 175.    -Kappa free light chain of 67.7.  Lambda free light chain of 1.42.  Ratio of kappa to lambda of 47.71.    -Beta 2 microglobulin of 3.4.   5. PET scan on 07/24/2017 reveals several focal areas of increased FDG uptake consistent with multiple myeloma.  There is probable epithelial cyst in tail of the pancreas.  No associated abnormal FDG activity.  Left thyroid cysts or nodules without any FDG activity.  There is a 0.3 cm left upper lobe lung nodule.   6.  Velcade, Revlimid and dexamethasone between on 08/14/2017 and 04/30/2018. Velcade stopped.   -Revlimid and dexamethasone continued.  -Revlimid held between 12/27/2021 and 03/08/2022.  -Daratumumab added on 11/14/2022.  7. CT chest, abdomen, and pelvis on 12/30/2021 does not reveal any malignancy.  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     SUBJECTIVE:  Ms. Dunaway is an 87-year-old  female with multiple myeloma on daratumumab, revlimid and dexamethasone. She is tolerating it well.      Zometa is on hold for dental work.    Labs on 05/15/2023 revealed increase in kappa free light chain to 75.99.  It was 38.1 on 04/17/2023.    Patient's overall condition is stable.  Patient has been having dizziness. Previous brain MRI did not reveal any acute abnormality. Patient has been evaluated by ENT.    She takes meclizine as needed.    No headache.  No chest pain.  No shortness of breath at rest.  Gets short of breath on exertion.  No abdominal pain.  No nausea or vomiting.  Appetite is fairly good.  No urinary complaints.  No bowel problem.  She has generalized weakness.  It is not getting worse. All other review of system negative.      PHYSICAL EXAMINATION:   GENERAL:  Alert and oriented x 3.   Rest of the systems not examined as this is a video visit.     LABORATORY: CBC, SPEP, IgG and free light chain done on 05/15/2023 reviewed.      ASSESSMENT:    1.  An 87-year-old female with multiple myeloma on Revlimid, daratumumab and dexamethasone.  Disease is  progressing.  2.  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    3.  Dizziness.  4.  Generalized weakness.     PLAN:    1.  Labs were reviewed with the patient and her family.  Explained to them that kappa free light chain is increasing.  Myeloma is progressing.    2.  Discussed regarding treatment for multiple myeloma.  Discussed regarding changing the treatment as disease is progressing.    After discussion, plan is to stop Revlimid and start pomalidomide.  She will continue on daratumumab and dexamethasone.  Side effect of pomalidomide discussed.  She is agreeable for it.  Pomalidomide will be started with next cycle of daratumumab.    3.  We will continue to hold the Zometa.  Once cleared by dentist, Zometa will be resumed.    4.  She continues to have dizziness.  Advised her to be careful and avoid fall.    5.  She and her family had few questions  which were all answered.  I will see her in mid July.  In between she will see our nurse practitioner.     TOTAL VISIT TIME: 30 minutes.  Time spent in today's visit, review of chart/investigations today, monitoring for toxicity of high risk medications and documentation today.      Again, thank you for allowing me to participate in the care of your patient.        Sincerely,        Alex Parry MD

## 2023-05-24 NOTE — LETTER
5/24/2023         RE: Quiana Dunaway  [address] [facility] [address]        Dear Colleague,    Thank you for referring your patient, Quiana Dunaway, to the [facility] [place]. Please see a copy of my visit note below.    Virtual Visit Details    Type of service:  Video Visit     Originating Location (pt. Location): Home    Distant Location (provider location):  On-site  Platform used for Video Visit: [facility]    HEMATOLOGY HISTORY: Mrs. Dunaway is a lady with multiple myeloma (IgG kappa). High risk, t(14;16).  1. On 05/01/2017, WBC of 3.4, hemoglobin of 10.2 and platelet of 154.  2. SPEP on 07/07/2017 revealed M-spike of 1.8.  3. Bone marrow biopsy on 07/12/2017 reveals kappa monotypic plasma cell population consistent with multiple myeloma.  Bone marrow is 70% cellular.  Plasma cell is 50-60%.     -There is gain of chromosome 1, 5, 9, 15, and 17.  There is translocation 14;16.   4.  On 07/18/2017:  -M-spike of 1.9.   -Immunofixation reveals monoclonal IgG kappa.    -IgG level of 2970.  IgA of 15 and IgM of 175.    -Kappa free light chain of 67.7.  Lambda free light chain of 1.42.  Ratio of kappa to lambda of 47.71.    -Beta 2 microglobulin of 3.4.   5. PET scan on 07/24/2017 reveals several focal areas of increased FDG uptake consistent with multiple myeloma.  There is probable epithelial cyst in tail of the pancreas.  No associated abnormal FDG activity.  Left thyroid cysts or nodules without any FDG activity.  There is a 0.3 cm left upper lobe lung nodule.   6.  Velcade, Revlimid and dexamethasone between on 08/14/2017 and 04/30/2018. Velcade stopped.   -Revlimid and dexamethasone continued.  -Revlimid held between 12/27/2021 and 03/08/2022.  -Daratumumab added on 11/14/2022.  7. CT chest, abdomen, and pelvis on 12/30/2021 does not reveal any malignancy.  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     SUBJECTIVE:  Ms. Dunaway is an 87-year-old  female with multiple myeloma on daratumumab, revlimid and dexamethasone. She is tolerating it well.      Zometa is on hold for dental work.    Labs on 05/15/2023 revealed increase in kappa free light chain to 75.99.  It was 38.1 on 04/17/2023.    Patient's overall condition is stable.  Patient has been having dizziness. Previous brain MRI did not reveal any acute abnormality. Patient has been evaluated by ENT.    She takes meclizine as needed.    No headache.  No chest pain.  No shortness of breath at rest.  Gets short of breath on exertion.  No abdominal pain.  No nausea or vomiting.  Appetite is fairly good.  No urinary complaints.  No bowel problem.  She has generalized weakness.  It is not getting worse. All other review of system negative.      PHYSICAL EXAMINATION:   GENERAL:  Alert and oriented x 3.   Rest of the systems not examined as this is a video visit.     LABORATORY: CBC, SPEP, IgG and free light chain done on 05/15/2023 reviewed.      ASSESSMENT:    1.  An 87-year-old female with multiple myeloma on Revlimid, daratumumab and dexamethasone.  Disease is  progressing.  2.  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    3.  Dizziness.  4.  Generalized weakness.     PLAN:    1.  Labs were reviewed with the patient and her family.  Explained to them that kappa free light chain is increasing.  Myeloma is progressing.    2.  Discussed regarding treatment for multiple myeloma.  Discussed regarding changing the treatment as disease is progressing.    After discussion, plan is to stop Revlimid and start pomalidomide.  She will continue on daratumumab and dexamethasone.  Side effect of pomalidomide discussed.  She is agreeable for it.  Pomalidomide will be started with next cycle of daratumumab.    3.  We will continue to hold the Zometa.  Once cleared by dentist, Zometa will be resumed.    4.  She continues to have dizziness.  Advised her to be careful and avoid fall.    5.  She and her family had few questions  which were all answered.  I will see her in mid July.  In between she will see our nurse practitioner.     TOTAL VISIT TIME: 30 minutes.  Time spent in today's visit, review of chart/investigations today, monitoring for toxicity of high risk medications and documentation today.      Again, thank you for allowing me to participate in the care of your patient.        Sincerely,        Alex Parry MD

## 2023-05-24 NOTE — PROGRESS NOTES
Virtual Visit Details    Type of service:  Video Visit     Originating Location (pt. Location): Home    Distant Location (provider location):  On-site  Platform used for Video Visit: [employer]ae

## 2023-05-25 DIAGNOSIS — R11.0 NAUSEA: ICD-10-CM

## 2023-05-25 RX ORDER — FAMOTIDINE 40 MG/1
40 TABLET, FILM COATED ORAL 2 TIMES DAILY
Qty: 180 TABLET | Refills: 3 | Status: SHIPPED | OUTPATIENT
Start: 2023-05-25 | End: 2024-02-20

## 2023-05-25 NOTE — TELEPHONE ENCOUNTER
Prior Authorization Approval    Medication: POMALYST 4 MG PO CAPS  Authorization Effective Date: 2/23/2023  Authorization Expiration Date: 5/24/2024  Approved Dose/Quantity: 21 for 28  Reference #:     Insurance Company: [employer] Clinical Review - Phone [phone] Fax [phone]  Expected CoPay: $1026.40     CoPay Card Available: No    Financial Assistance Needed:   Which Pharmacy is filling the prescription: [facility] - [place], [place]  [facility] [address]  Pharmacy Notified: Yes  Patient Notified: Yes

## 2023-05-30 DIAGNOSIS — C90.00 MULTIPLE MYELOMA NOT HAVING ACHIEVED REMISSION (H): Primary | ICD-10-CM

## 2023-05-30 NOTE — TELEPHONE ENCOUNTER
Patient enrolled in [facility] program. Patients daughter in law Jackie informed of approval and copay, they have an ideal start date of 6/12.

## 2023-05-31 ENCOUNTER — DOCUMENTATION ONLY (OUTPATIENT)
Dept: PHARMACY | Facility: CLINIC | Age: 88
End: 2023-05-31
Payer: MEDICARE

## 2023-05-31 DIAGNOSIS — R11.0 NAUSEA: ICD-10-CM

## 2023-05-31 DIAGNOSIS — C90.00 MULTIPLE MYELOMA NOT HAVING ACHIEVED REMISSION (H): Primary | ICD-10-CM

## 2023-05-31 RX ORDER — DEXAMETHASONE 4 MG/1
4 TABLET ORAL WEEKLY
Qty: 16 TABLET | Refills: 2 | Status: SHIPPED | OUTPATIENT
Start: 2023-06-12 | End: 2023-06-28

## 2023-05-31 NOTE — PROGRESS NOTES
Oral Chemotherapy Monitoring Program    Lab Monitoring Plan  CMP monthly, CBC every 2 weeks for 1st 2 cycles, then monthly for both  Subjective/Objective:  Quiana Dunaway is a 87 year old female contacted by phone for an initial visit for oral chemotherapy education.  Quiana's daughter-in-law Jackie was contacted for initial education on the patient's Pomalyst. Information was reviewed including administration, lab monitoring, and side effects reviewed with patient. Patient was told to not start the medication until 06/12 when she has a juan appointment        3/6/2023    11:00 AM 3/13/2023     6:00 PM 4/3/2023     9:00 AM 4/24/2023     1:00 PM 5/15/2023    10:00 AM 5/24/2023     9:00 AM 5/31/2023     2:00 PM   ORAL CHEMOTHERAPY   Assessment Type Lab Monitoring Refill Refill;Chart Review Refill;Chart Review Lab Monitoring Chart Review;Initial Work up New Teach   Diagnosis Code Multiple Myeloma Multiple Myeloma Multiple Myeloma Multiple Myeloma Multiple Myeloma Multiple Myeloma Multiple Myeloma   Providers Dr Sohan Parry   Clinic Name/Location [facility]   Drug Name Revlimid (lenalidomide) Revlimid (lenalidomide) Revlimid (lenalidomide) Revlimid (lenalidomide) Revlimid (lenalidomide) Pomalyst (pomalidomide) Pomalyst (pomalidomide)   Dose 10 mg 10 mg 10 mg 10 mg 10 mg 4 mg 4 mg   Current Schedule Daily Daily Daily Daily Daily Daily Daily   Cycle Details 2 weeks on, 1 week off 2 weeks on, 1 week off 2 weeks on, 1 week off 2 weeks on, 1 week off 2 weeks on, 1 week off 3 weeks on, 1 week off 3 weeks on, 1 week off   Start Date of Last Cycle 2/27/2023 2/27/2023 3/20/2023 4/10/2023 4/10/2023 6/12/2023 6/12/2023   Planned next cycle start date 3/20/2023 3/20/2023 4/10/2023 5/1/2023 5/15/2023     Adverse Effects   Anemia;Thrombocytopenia  Anemia     Anemia   Grade 1  Grade 1     Pharmacist Intervention(anemia)   No  No   "   Thrombocytopenia   Grade 1       Pharmacist Intervention(thrombocytopenia)   No       Any new drug interactions?   No   No No   Is the dose as ordered appropriate for the patient?   Yes  Yes Yes Yes       Last PHQ-2 Score on record:       4/24/2023    11:42 AM 4/24/2023    11:41 AM   PHQ-2 ( 1999 [employer])   Q1: Little interest or pleasure in doing things 1 0   Q2: Feeling down, depressed or hopeless 1 1   PHQ-2 Score 2 1   PHQ-2 Total Score (12-17 Years)- Positive if 3 or more points; Administer PHQ-A if positive 2 1   Q1: Little interest or pleasure in doing things Several days    Q2: Feeling down, depressed or hopeless Several days    PHQ-2 Score 2        Vitals:  BP:   BP Readings from Last 1 Encounters:   05/15/23 138/85     Wt Readings from Last 1 Encounters:   05/15/23 59 kg (130 lb)     Estimated body surface area is 1.61 meters squared as calculated from the following:    Height as of 5/15/23: 1.575 m (5' 2\").    Weight as of 5/15/23: 59 kg (130 lb).    Labs:  No results found for NA within last 30 days.     No results found for K within last 30 days.     No results found for CA within last 30 days.     No results found for Mag within last 30 days.     No results found for Phos within last 30 days.     No results found for ALBUMIN within last 30 days.     No results found for BUN within last 30 days.     No results found for CR within last 30 days.     No results found for AST within last 30 days.     No results found for ALT within last 30 days.     No results found for BILITOTAL within last 30 days.     _  Result Component Current Result Ref Range   WBC Count 4.1 (5/15/2023) 4.0 - 11.0 10e3/uL     _  Result Component Current Result Ref Range   Hemoglobin 10.8 (L) (5/15/2023) 11.7 - 15.7 g/dL     _  Result Component Current Result Ref Range   Platelet Count 199 (5/15/2023) 150 - 450 10e3/uL     _  Result Component Current Result Ref Range   Absolute Neutrophils 1.7 (5/15/2023) 1.6 - 8.3 10e3/uL    "     Assessment:  Patient is appropriate to start therapy.    Plan:  Basic chemotherapy teaching was reviewed with the patient and the patient's caregiver including indication, start date of therapy, dose, administration, adverse effects, missed doses, food and drug interactions, monitoring, side effect management, office contact information, and safe handling. Written materials were provided and all questions answered.    Follow-Up:  - One week for tolerability     [facility]  Pharmacy Intern  [facility]  [phone]

## 2023-06-01 RX ORDER — ONDANSETRON 8 MG/1
8 TABLET, FILM COATED ORAL EVERY 8 HOURS PRN
Qty: 90 TABLET | Refills: 0 | Status: SHIPPED | OUTPATIENT
Start: 2023-06-01 | End: 2023-12-21

## 2023-06-01 NOTE — TELEPHONE ENCOUNTER
Oral Chemotherapy Monitoring Program   Medication: Pomalyst  Rx: 4mg PO daily on days 1 through 14 of 21 day cycle   Auth #:  [id number] obtained on 5/31/2023  Risk Category: adult female of non child bearing potential  Routine survey questions reviewed.   Rx to be Escribed to [facility]     Terrie Bowen [facility] Oncology Pharmacy Liaison  [phone]

## 2023-06-02 DIAGNOSIS — R42 DIZZINESS: ICD-10-CM

## 2023-06-02 DIAGNOSIS — I50.32 CHRONIC DIASTOLIC CONGESTIVE HEART FAILURE (H): ICD-10-CM

## 2023-06-02 RX ORDER — MECLIZINE HYDROCHLORIDE 25 MG/1
25 TABLET ORAL 3 TIMES DAILY PRN
Qty: 30 TABLET | Refills: 0 | Status: SHIPPED | OUTPATIENT
Start: 2023-06-02 | End: 2023-07-12

## 2023-06-02 RX ORDER — POTASSIUM CHLORIDE 750 MG/1
10 TABLET, EXTENDED RELEASE ORAL DAILY
Qty: 90 TABLET | Refills: 3 | Status: CANCELLED | OUTPATIENT
Start: 2023-06-02

## 2023-06-07 ENCOUNTER — OFFICE VISIT (OUTPATIENT)
Dept: FAMILY MEDICINE | Facility: CLINIC | Age: 88
End: 2023-06-07
Payer: MEDICARE

## 2023-06-07 VITALS
DIASTOLIC BLOOD PRESSURE: 82 MMHG | BODY MASS INDEX: 24.16 KG/M2 | SYSTOLIC BLOOD PRESSURE: 132 MMHG | OXYGEN SATURATION: 95 % | WEIGHT: 132.1 LBS | TEMPERATURE: 97.8 F | HEART RATE: 85 BPM | RESPIRATION RATE: 18 BRPM

## 2023-06-07 DIAGNOSIS — H60.391 INFECTIVE OTITIS EXTERNA, RIGHT: Primary | ICD-10-CM

## 2023-06-07 DIAGNOSIS — I50.32 CHRONIC DIASTOLIC CONGESTIVE HEART FAILURE (H): ICD-10-CM

## 2023-06-07 DIAGNOSIS — K13.79 MOUTH SORE: ICD-10-CM

## 2023-06-07 PROCEDURE — 99213 OFFICE O/P EST LOW 20 MIN: CPT | Performed by: INTERNAL MEDICINE

## 2023-06-07 RX ORDER — POTASSIUM CHLORIDE 750 MG/1
10 TABLET, EXTENDED RELEASE ORAL DAILY
Qty: 90 TABLET | Refills: 3 | Status: SHIPPED | OUTPATIENT
Start: 2023-06-07 | End: 2024-06-04

## 2023-06-07 RX ORDER — NEOMYCIN SULFATE, POLYMYXIN B SULFATE, HYDROCORTISONE 3.5; 10000; 1 MG/ML; [USP'U]/ML; MG/ML
3 SOLUTION/ DROPS AURICULAR (OTIC) 4 TIMES DAILY
Qty: 10 ML | Refills: 0 | Status: SHIPPED | OUTPATIENT
Start: 2023-06-07 | End: 2024-09-03

## 2023-06-07 RX ORDER — TRIAMCINOLONE ACETONIDE 0.1 %
PASTE (GRAM) DENTAL 2 TIMES DAILY
Qty: 5 G | Refills: 2 | Status: SHIPPED | OUTPATIENT
Start: 2023-06-07 | End: 2024-06-04

## 2023-06-07 ASSESSMENT — PAIN SCALES - GENERAL: PAINLEVEL: MODERATE PAIN (5)

## 2023-06-07 NOTE — PROGRESS NOTES
Assessment & Plan     Infective otitis externa, right  Try below for persistent symptoms in right ear canal   - neomycin-polymyxin-hydrocortisone (CORTISPORIN) 3.5-50235-6 otic solution; Place 3 drops in ear(s) 4 times daily    Mouth sore  Try below for mouth sore  - triamcinolone (KENALOG) 0.1 % paste; Take by mouth 2 times daily    Follow up if symptoms persist despite these intervention    28 minutes spent by me on the date of the encounter doing chart review, history and exam, documentation and further activities per the note           César Chung MD  [facility] MAGALY Araya is a 87 year old, presenting for the following health issues:  Follow Up (Patient here for monthly follow up with Dr. Chung.  )         View : No data to display.              History of Present Illness       Reason for visit:  Tongue hurts and ear problems    She eats 2-3 servings of fruits and vegetables daily.She consumes 0 sweetened beverage(s) daily.She exercises with enough effort to increase her heart rate 20 to 29 minutes per day.  She exercises with enough effort to increase her heart rate 3 or less days per week.   She is taking medications regularly.         Had ear wax remove from right ear by ENT  Since them mild pain in ear canal   Also recurrent left tongue sore   In past similar lesion improved quickly with kenalog paste      Review of Systems         Objective    /82 (BP Location: Left arm, Patient Position: Sitting, Cuff Size: Adult Regular)   Pulse 85   Temp 97.8  F (36.6  C) (Temporal)   Resp 18   Wt 59.9 kg (132 lb 1.6 oz)   SpO2 95%   BMI 24.16 kg/m    Body mass index is 24.16 kg/m .  Physical Exam   GENERAL: healthy, alert and no distress  HENT: mild external otitis in right canal, right TM is normal, left TM is normal; aphthous ulcer left tongue  RESP: lungs clear to auscultation - no rales, rhonchi or wheezes  CV: Heart with regular rate and rhythm.   MS: no gross  musculoskeletal defects noted, no edema  NEURO: Normal strength and tone, mentation intact and speech normal  PSYCH: mentation appears normal, affect normal/bright

## 2023-06-12 ENCOUNTER — ONCOLOGY VISIT (OUTPATIENT)
Dept: ONCOLOGY | Facility: CLINIC | Age: 88
End: 2023-06-12
Attending: PHYSICIAN ASSISTANT
Payer: MEDICARE

## 2023-06-12 ENCOUNTER — LAB (OUTPATIENT)
Dept: INFUSION THERAPY | Facility: CLINIC | Age: 88
End: 2023-06-12
Attending: INTERNAL MEDICINE
Payer: MEDICARE

## 2023-06-12 VITALS
DIASTOLIC BLOOD PRESSURE: 75 MMHG | BODY MASS INDEX: 23.92 KG/M2 | TEMPERATURE: 98.7 F | HEART RATE: 79 BPM | RESPIRATION RATE: 16 BRPM | HEIGHT: 62 IN | WEIGHT: 130 LBS | SYSTOLIC BLOOD PRESSURE: 117 MMHG | OXYGEN SATURATION: 97 %

## 2023-06-12 DIAGNOSIS — C90.00 MULTIPLE MYELOMA NOT HAVING ACHIEVED REMISSION (H): Primary | ICD-10-CM

## 2023-06-12 LAB
ALBUMIN SERPL BCG-MCNC: 3.8 G/DL (ref 3.5–5.2)
ALP SERPL-CCNC: 99 U/L (ref 35–104)
ALT SERPL W P-5'-P-CCNC: 8 U/L (ref 10–35)
ANION GAP SERPL CALCULATED.3IONS-SCNC: 10 MMOL/L (ref 7–15)
AST SERPL W P-5'-P-CCNC: 10 U/L (ref 10–35)
BASOPHILS # BLD AUTO: 0 10E3/UL (ref 0–0.2)
BASOPHILS NFR BLD AUTO: 1 %
BILIRUB SERPL-MCNC: 0.4 MG/DL
BUN SERPL-MCNC: 32.5 MG/DL (ref 8–23)
CALCIUM SERPL-MCNC: 9.2 MG/DL (ref 8.8–10.2)
CHLORIDE SERPL-SCNC: 108 MMOL/L (ref 98–107)
CREAT SERPL-MCNC: 0.66 MG/DL (ref 0.51–0.95)
DEPRECATED HCO3 PLAS-SCNC: 23 MMOL/L (ref 22–29)
EOSINOPHIL # BLD AUTO: 0 10E3/UL (ref 0–0.7)
EOSINOPHIL NFR BLD AUTO: 1 %
ERYTHROCYTE [DISTWIDTH] IN BLOOD BY AUTOMATED COUNT: 13.9 % (ref 10–15)
GFR SERPL CREATININE-BSD FRML MDRD: 85 ML/MIN/1.73M2
GLUCOSE SERPL-MCNC: 97 MG/DL (ref 70–99)
HCT VFR BLD AUTO: 29.9 % (ref 35–47)
HGB BLD-MCNC: 10.1 G/DL (ref 11.7–15.7)
IMM GRANULOCYTES # BLD: 0 10E3/UL
IMM GRANULOCYTES NFR BLD: 0 %
LYMPHOCYTES # BLD AUTO: 1.5 10E3/UL (ref 0.8–5.3)
LYMPHOCYTES NFR BLD AUTO: 29 %
MCH RBC QN AUTO: 30.7 PG (ref 26.5–33)
MCHC RBC AUTO-ENTMCNC: 33.8 G/DL (ref 31.5–36.5)
MCV RBC AUTO: 91 FL (ref 78–100)
MONOCYTES # BLD AUTO: 0.9 10E3/UL (ref 0–1.3)
MONOCYTES NFR BLD AUTO: 18 %
NEUTROPHILS # BLD AUTO: 2.6 10E3/UL (ref 1.6–8.3)
NEUTROPHILS NFR BLD AUTO: 51 %
NRBC # BLD AUTO: 0 10E3/UL
NRBC BLD AUTO-RTO: 0 /100
PLATELET # BLD AUTO: 190 10E3/UL (ref 150–450)
POTASSIUM SERPL-SCNC: 4.1 MMOL/L (ref 3.4–5.3)
PROT SERPL-MCNC: 6.5 G/DL (ref 6.4–8.3)
RBC # BLD AUTO: 3.29 10E6/UL (ref 3.8–5.2)
SODIUM SERPL-SCNC: 141 MMOL/L (ref 136–145)
TOTAL PROTEIN SERUM FOR ELP: 6.1 G/DL (ref 6.4–8.3)
WBC # BLD AUTO: 5.1 10E3/UL (ref 4–11)

## 2023-06-12 PROCEDURE — 85025 COMPLETE CBC W/AUTO DIFF WBC: CPT | Performed by: INTERNAL MEDICINE

## 2023-06-12 PROCEDURE — 82784 ASSAY IGA/IGD/IGG/IGM EACH: CPT | Performed by: INTERNAL MEDICINE

## 2023-06-12 PROCEDURE — 84165 PROTEIN E-PHORESIS SERUM: CPT | Mod: 26

## 2023-06-12 PROCEDURE — 83521 IG LIGHT CHAINS FREE EACH: CPT | Mod: 59 | Performed by: INTERNAL MEDICINE

## 2023-06-12 PROCEDURE — G0463 HOSPITAL OUTPT CLINIC VISIT: HCPCS | Mod: 25 | Performed by: INTERNAL MEDICINE

## 2023-06-12 PROCEDURE — 96401 CHEMO ANTI-NEOPL SQ/IM: CPT

## 2023-06-12 PROCEDURE — 250N000011 HC RX IP 250 OP 636: Performed by: INTERNAL MEDICINE

## 2023-06-12 PROCEDURE — 36415 COLL VENOUS BLD VENIPUNCTURE: CPT

## 2023-06-12 PROCEDURE — 99214 OFFICE O/P EST MOD 30 MIN: CPT | Performed by: INTERNAL MEDICINE

## 2023-06-12 PROCEDURE — 84165 PROTEIN E-PHORESIS SERUM: CPT | Mod: TC | Performed by: PATHOLOGY

## 2023-06-12 PROCEDURE — 84155 ASSAY OF PROTEIN SERUM: CPT | Performed by: INTERNAL MEDICINE

## 2023-06-12 PROCEDURE — 80053 COMPREHEN METABOLIC PANEL: CPT | Performed by: INTERNAL MEDICINE

## 2023-06-12 RX ADMIN — DARATUMUMAB AND HYALURONIDASE-FIHJ (HUMAN RECOMBINANT) 1800 MG: 1800; 30000 INJECTION SUBCUTANEOUS at 10:06

## 2023-06-12 ASSESSMENT — PAIN SCALES - GENERAL: PAINLEVEL: NO PAIN (0)

## 2023-06-12 NOTE — PROGRESS NOTES
Medical Assistant Note:  Quiana Dunaway presents today for blood draw.    Patient seen by provider today: Yes: nakita.   present during visit today: Not Applicable.    Concerns: No Concerns.    Procedure:  Lab draw site: right hand, Needle type: bf, Gauge: 23.    Post Assessment:  Labs drawn without difficulty: Yes.    Discharge Plan:  Departure Mode: Ambulatory.    Face to Face Time: 5 min.    Mariela Lyn, CMA

## 2023-06-12 NOTE — PROGRESS NOTES
"Oncology Rooming Note    June 12, 2023 9:26 AM   Quiana Dunaway is a 87 year old female who presents for:    Chief Complaint   Patient presents with     Oncology Clinic Visit     Initial Vitals: There were no vitals taken for this visit. Estimated body mass index is 24.16 kg/m  as calculated from the following:    Height as of 5/15/23: 1.575 m (5' 2\").    Weight as of 6/7/23: 59.9 kg (132 lb 1.6 oz). There is no height or weight on file to calculate BSA.  Data Unavailable Comment: Data Unavailable   No LMP recorded. Patient is postmenopausal.  Allergies reviewed: Yes  Medications reviewed: Yes    Medications: Medication refills not needed today.  Pharmacy name entered into [facility]:    [facility] - [place], [place] - [address]  [id number] - [place], [place] - [address] AT [facility]    Clinical concerns:  doctor was notified.      Jacquie Mcdaniel MA            "

## 2023-06-12 NOTE — PROGRESS NOTES
Infusion Nursing Note:  Quiana Dunaway presents today for C8D1 Darzalex Faspro.    Patient seen by provider today: Yes: Dr. Parry   present during visit today: Not Applicable.    Note: N/A.      Intravenous Access:  No Intravenous access/labs at this visit.    Treatment Conditions:  Lab Results   Component Value Date    HGB 10.1 (L) 06/12/2023    WBC 5.1 06/12/2023    ANEU 1.4 (L) 03/20/2023    ANEUTAUTO 2.6 06/12/2023     06/12/2023      Results reviewed, labs MET treatment parameters, ok to proceed with treatment.      Post Infusion Assessment:  Patient tolerated injection without incident.  Site patent and intact, free from redness, edema or discomfort.  No evidence of extravasations.       Discharge Plan:   [facility]  Patient will return 7/10 for next appointment.   Patient discharged in stable condition accompanied by: daughter.  Departure Mode: Ambulatory.      Tonja Lopez RN

## 2023-06-12 NOTE — LETTER
"    6/12/2023         RE: Quiana Dunaway  [address] [facility] [address]        Dear Colleague,    Thank you for referring your patient, Quiana Dunaway, to the [facility]. Please see a copy of my visit note below.    Oncology Rooming Note    June 12, 2023 9:26 AM   Quiana Dunaway is a 87 year old female who presents for:    Chief Complaint   Patient presents with     Oncology Clinic Visit     Initial Vitals: There were no vitals taken for this visit. Estimated body mass index is 24.16 kg/m  as calculated from the following:    Height as of 5/15/23: 1.575 m (5' 2\").    Weight as of 6/7/23: 59.9 kg (132 lb 1.6 oz). There is no height or weight on file to calculate BSA.  Data Unavailable Comment: Data Unavailable   No LMP recorded. Patient is postmenopausal.  Allergies reviewed: Yes  Medications reviewed: Yes    Medications: Medication refills not needed today.  Pharmacy name entered into [facility]:    [facility] - [place], [place] - [address]  [id number] - [place], [place] - [address] AT [facility]    Clinical concerns:  doctor was notified.      Jacquie Mcdaniel MA              HEMATOLOGY HISTORY: Mrs. Dunaway is a lady with multiple myeloma (IgG kappa). High risk, t(14;16).  1. On 05/01/2017, WBC of 3.4, hemoglobin of 10.2 and platelet of 154.  2. SPEP on 07/07/2017 revealed M-spike of 1.8.  3. Bone marrow biopsy on 07/12/2017 reveals kappa monotypic plasma cell population consistent with multiple myeloma.  Bone marrow is 70% cellular.  Plasma cell is 50-60%.     -There is gain of chromosome 1, 5, 9, 15, and 17.  There is translocation 14;16.   4.  On 07/18/2017:  -M-spike of 1.9.   -Immunofixation reveals monoclonal IgG kappa.    -IgG level of 2970.  IgA of 15 and IgM of 175.    -Kappa free light chain of 67.7.  Lambda free light chain of 1.42.  Ratio of kappa to lambda of 47.71.    -Beta 2 " microglobulin of 3.4.   5. PET scan on 07/24/2017 reveals several focal areas of increased FDG uptake consistent with multiple myeloma.  There is probable epithelial cyst in tail of the pancreas.  No associated abnormal FDG activity.  Left thyroid cysts or nodules without any FDG activity.  There is a 0.3 cm left upper lobe lung nodule.   6.  Velcade, Revlimid and dexamethasone between on 08/14/2017 and 04/30/2018. Velcade stopped.   -Revlimid and dexamethasone continued.  -Revlimid held between 12/27/2021 and 03/08/2022.  -Daratumumab added on 11/14/2022.  -Changed to daratumumab, pomalidomide and dexamethasone on 06/12/2023.  7. CT chest, abdomen, and pelvis on 12/30/2021 does not reveal any malignancy.  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     SUBJECTIVE:  Ms. Dunaway is an 87-year-old female with multiple myeloma. Treatment being changed to daratumumab, pomalidomide and dexamethasone.     Zometa has been on hold for dental work.    Her overall condition is stable.  She has generalized weakness which will go along with her age.  She has arthritic shoulder pain mainly in the right shoulder.  No worsening.  She has chronic dizziness.    No headache.  No chest pain.  No shortness of breath.  No abdominal pain.  No nausea or vomiting.  No urinary complaint.  She gets intermittent diarrhea.  No bleeding.  No fever or night sweats.  All other review of system is negative.      PHYSICAL EXAMINATION:   GENERAL:  Alert and oriented x 3.   Rest of the systems not examined.     LABORATORY: CBC, SPEP, IgG and free light chain done on 05/15/2023 reviewed.      ASSESSMENT:    1.  An 87-year-old female with multiple myeloma.  2.  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    3.  Chronic dizziness.  4.  Generalized weakness.  5.  Arthritic shoulder pain, right more than left.     PLAN:    1.  Discussed regarding myeloma.  She will be continued on daratumumab and dexamethasone.  Revlimid is being  changed to pomalidomide.  Hopefully she tolerates it well.    Currently she is on dexamethasone 4 mg weekly.  In future we will try to give dexamethasone only on the day of daratumumab.    2.  Zometa is on hold.  It will be resumed once all the dental work-up is complete.    3.  Patient has chronic dizziness.  Advised her to be careful and not fall.  She will continue on meclizine as needed.    4.  Patient has arthritic joint pain especially in the shoulder.  She will take pain medication as needed.    5.  She gets intermittent diarrhea.  She will take Imodium as needed.    6.  Patient has some urinary frequency.  No burning or pain or blood in the urine.  She will let us know if it gets worse.  We will then check UA.    7.  She had few questions were all answered.  I will see her in 8 weeks.  In between she will see our nurse practitioner.     TOTAL VISIT TIME: 30 minutes.  Time spent in today's visit, review of chart/investigations today, monitoring for toxicity of high risk medications and documentation today.      Again, thank you for allowing me to participate in the care of your patient.        Sincerely,        Alex Parry MD

## 2023-06-13 LAB
ALBUMIN SERPL ELPH-MCNC: 3.8 G/DL (ref 3.7–5.1)
ALPHA1 GLOB SERPL ELPH-MCNC: 0.3 G/DL (ref 0.2–0.4)
ALPHA2 GLOB SERPL ELPH-MCNC: 0.6 G/DL (ref 0.5–0.9)
B-GLOBULIN SERPL ELPH-MCNC: 0.5 G/DL (ref 0.6–1)
GAMMA GLOB SERPL ELPH-MCNC: 0.9 G/DL (ref 0.7–1.6)
IGG SERPL-MCNC: 1044 MG/DL (ref 610–1616)
KAPPA LC FREE SER-MCNC: 56.31 MG/DL (ref 0.33–1.94)
KAPPA LC FREE/LAMBDA FREE SER NEPH: 225.24 {RATIO} (ref 0.26–1.65)
LAMBDA LC FREE SERPL-MCNC: 0.25 MG/DL (ref 0.57–2.63)
M PROTEIN SERPL ELPH-MCNC: 0.7 G/DL
PROT PATTERN SERPL ELPH-IMP: ABNORMAL

## 2023-06-13 NOTE — PROGRESS NOTES
HEMATOLOGY HISTORY: Mrs. Dunaway is a lady with multiple myeloma (IgG kappa). High risk, t(14;16).  1. On 05/01/2017, WBC of 3.4, hemoglobin of 10.2 and platelet of 154.  2. SPEP on 07/07/2017 revealed M-spike of 1.8.  3. Bone marrow biopsy on 07/12/2017 reveals kappa monotypic plasma cell population consistent with multiple myeloma.  Bone marrow is 70% cellular.  Plasma cell is 50-60%.     -There is gain of chromosome 1, 5, 9, 15, and 17.  There is translocation 14;16.   4.  On 07/18/2017:  -M-spike of 1.9.   -Immunofixation reveals monoclonal IgG kappa.    -IgG level of 2970.  IgA of 15 and IgM of 175.    -Kappa free light chain of 67.7.  Lambda free light chain of 1.42.  Ratio of kappa to lambda of 47.71.    -Beta 2 microglobulin of 3.4.   5. PET scan on 07/24/2017 reveals several focal areas of increased FDG uptake consistent with multiple myeloma.  There is probable epithelial cyst in tail of the pancreas.  No associated abnormal FDG activity.  Left thyroid cysts or nodules without any FDG activity.  There is a 0.3 cm left upper lobe lung nodule.   6.  Velcade, Revlimid and dexamethasone between on 08/14/2017 and 04/30/2018. Velcade stopped.   -Revlimid and dexamethasone continued.  -Revlimid held between 12/27/2021 and 03/08/2022.  -Daratumumab added on 11/14/2022.  -Changed to daratumumab, pomalidomide and dexamethasone on 06/12/2023.  7. CT chest, abdomen, and pelvis on 12/30/2021 does not reveal any malignancy.  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     SUBJECTIVE:  Ms. Dunaway is an 87-year-old female with multiple myeloma. Treatment being changed to daratumumab, pomalidomide and dexamethasone.     Zometa has been on hold for dental work.    Her overall condition is stable.  She has generalized weakness which will go along with her age.  She has arthritic shoulder pain mainly in the right shoulder.  No worsening.  She has chronic dizziness.    No headache.  No chest  pain.  No shortness of breath.  No abdominal pain.  No nausea or vomiting.  No urinary complaint.  She gets intermittent diarrhea.  No bleeding.  No fever or night sweats.  All other review of system is negative.      PHYSICAL EXAMINATION:   GENERAL:  Alert and oriented x 3.   Rest of the systems not examined.     LABORATORY: CBC, SPEP, IgG and free light chain done on 05/15/2023 reviewed.      ASSESSMENT:    1.  An 87-year-old female with multiple myeloma.  2.  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    3.  Chronic dizziness.  4.  Generalized weakness.  5.  Arthritic shoulder pain, right more than left.     PLAN:    1.  Discussed regarding myeloma.  She will be continued on daratumumab and dexamethasone.  Revlimid is being changed to pomalidomide.  Hopefully she tolerates it well.    Currently she is on dexamethasone 4 mg weekly.  In future we will try to give dexamethasone only on the day of daratumumab.    2.  Zometa is on hold.  It will be resumed once all the dental work-up is complete.    3.  Patient has chronic dizziness.  Advised her to be careful and not fall.  She will continue on meclizine as needed.    4.  Patient has arthritic joint pain especially in the shoulder.  She will take pain medication as needed.    5.  She gets intermittent diarrhea.  She will take Imodium as needed.    6.  Patient has some urinary frequency.  No burning or pain or blood in the urine.  She will let us know if it gets worse.  We will then check UA.    7.  She had few questions were all answered.  I will see her in 8 weeks.  In between she will see our nurse practitioner.     TOTAL VISIT TIME: 30 minutes.  Time spent in today's visit, review of chart/investigations today, monitoring for toxicity of high risk medications and documentation today.

## 2023-06-19 ENCOUNTER — TELEPHONE (OUTPATIENT)
Dept: PHARMACY | Facility: CLINIC | Age: 88
End: 2023-06-19
Payer: MEDICARE

## 2023-06-19 NOTE — ORAL ONC MGMT
Oral Chemotherapy Monitoring Program    Subjective/Objective:  Quiana Dunaway is a 87 year old female contacted by phone for a follow-up visit for oral chemotherapy.  Spoke with daughter in law, Jackie, today to check in on how Quiana has been doing since starting pomalidomide. Jackie confirms Quiana started on 6/12. She is doing similar to how she was on revlimid with some weakness and fatigue, but not any worse. She has had a little nausea (two different days) where she took prochlorperazine or tums. These both helped relieve her nausea. We discussed the importance of calling if nausea or weakness/fatigue worsens and going in immediately for any signs of bleeding or infection in between appts. Jackie verbalzied understanding of the plan.         3/13/2023     6:00 PM 4/3/2023     9:00 AM 4/24/2023     1:00 PM 5/15/2023    10:00 AM 5/24/2023     9:00 AM 5/31/2023     2:00 PM 6/19/2023    12:00 PM   ORAL CHEMOTHERAPY   Assessment Type Refill Refill;Chart Review Refill;Chart Review Lab Monitoring Chart Review;Initial Work up New Teach Initial Follow up   Diagnosis Code Multiple Myeloma Multiple Myeloma Multiple Myeloma Multiple Myeloma Multiple Myeloma Multiple Myeloma Multiple Myeloma   Providers Dr Sohan Parry   Clinic Name/Location [facility]   Drug Name Revlimid (lenalidomide) Revlimid (lenalidomide) Revlimid (lenalidomide) Revlimid (lenalidomide) Pomalyst (pomalidomide) Pomalyst (pomalidomide) Pomalyst (pomalidomide)   Dose 10 mg 10 mg 10 mg 10 mg 4 mg 4 mg 4 mg   Current Schedule Daily Daily Daily Daily Daily Daily Daily   Cycle Details 2 weeks on, 1 week off 2 weeks on, 1 week off 2 weeks on, 1 week off 2 weeks on, 1 week off 3 weeks on, 1 week off 3 weeks on, 1 week off 3 weeks on, 1 week off   Start Date of Last Cycle 2/27/2023 3/20/2023 4/10/2023 4/10/2023 6/12/2023 6/12/2023 6/12/2023   Planned next  "cycle start date 3/20/2023 4/10/2023 5/1/2023 5/15/2023      Adverse Effects  Anemia;Thrombocytopenia  Anemia      Anemia  Grade 1  Grade 1      Pharmacist Intervention(anemia)  No  No      Thrombocytopenia  Grade 1        Pharmacist Intervention(thrombocytopenia)  No        Any new drug interactions?  No   No No    Is the dose as ordered appropriate for the patient?  Yes  Yes Yes Yes        Last PHQ-2 Score on record:       4/24/2023    11:42 AM 4/24/2023    11:41 AM   PHQ-2 ( 1999 [employer])   Q1: Little interest or pleasure in doing things 1 0   Q2: Feeling down, depressed or hopeless 1 1   PHQ-2 Score 2 1   PHQ-2 Total Score (12-17 Years)- Positive if 3 or more points; Administer PHQ-A if positive 2 1   Q1: Little interest or pleasure in doing things Several days    Q2: Feeling down, depressed or hopeless Several days    PHQ-2 Score 2        Vitals:  BP:   BP Readings from Last 1 Encounters:   06/12/23 117/75     Wt Readings from Last 1 Encounters:   06/12/23 59 kg (130 lb)     Estimated body surface area is 1.61 meters squared as calculated from the following:    Height as of 6/12/23: 1.575 m (5' 2\").    Weight as of 6/12/23: 59 kg (130 lb).    Labs:  _  Result Component Current Result Ref Range   Sodium 141 (6/12/2023) 136 - 145 mmol/L     _  Result Component Current Result Ref Range   Potassium 4.1 (6/12/2023) 3.4 - 5.3 mmol/L     _  Result Component Current Result Ref Range   Calcium 9.2 (6/12/2023) 8.8 - 10.2 mg/dL     No results found for Mag within last 30 days.     No results found for Phos within last 30 days.     _  Result Component Current Result Ref Range   Albumin 3.8 (6/12/2023) 3.5 - 5.2 g/dL     _  Result Component Current Result Ref Range   Urea Nitrogen 32.5 (H) (6/12/2023) 8.0 - 23.0 mg/dL     _  Result Component Current Result Ref Range   Creatinine 0.66 (6/12/2023) 0.51 - 0.95 mg/dL     _  Result Component Current Result Ref Range   AST 10 (6/12/2023) 10 - 35 U/L     _  Result Component Current " Result Ref Range   ALT 8 (L) (6/12/2023) 10 - 35 U/L     _  Result Component Current Result Ref Range   Bilirubin Total 0.4 (6/12/2023) <=1.2 mg/dL     _  Result Component Current Result Ref Range   WBC Count 5.1 (6/12/2023) 4.0 - 11.0 10e3/uL     _  Result Component Current Result Ref Range   Hemoglobin 10.1 (L) (6/12/2023) 11.7 - 15.7 g/dL     _  Result Component Current Result Ref Range   Platelet Count 190 (6/12/2023) 150 - 450 10e3/uL     No results found for ANC within last 30 days.     _  Result Component Current Result Ref Range   Absolute Neutrophils 2.6 (6/12/2023) 1.6 - 8.3 10e3/uL          Assessment/Plan:  Proceed with pomalidomide. Repeat labs on 6/26 (2 week elliott) Jackie riley scheduling will reach out to set this up. New cycle due to start 7/10 as well.     Follow-Up:  1 weeks with labs.     Refill Due:  7/10    Debra Ortega PharmD  June 19, 2023

## 2023-06-26 ENCOUNTER — LAB (OUTPATIENT)
Dept: INFUSION THERAPY | Facility: CLINIC | Age: 88
End: 2023-06-26
Attending: INTERNAL MEDICINE
Payer: MEDICARE

## 2023-06-26 ENCOUNTER — DOCUMENTATION ONLY (OUTPATIENT)
Dept: PHARMACY | Facility: CLINIC | Age: 88
End: 2023-06-26

## 2023-06-26 DIAGNOSIS — C90.00 MULTIPLE MYELOMA NOT HAVING ACHIEVED REMISSION (H): Primary | ICD-10-CM

## 2023-06-26 LAB
ALBUMIN SERPL BCG-MCNC: 4 G/DL (ref 3.5–5.2)
ALP SERPL-CCNC: 100 U/L (ref 35–104)
ALT SERPL W P-5'-P-CCNC: 11 U/L (ref 0–50)
ANION GAP SERPL CALCULATED.3IONS-SCNC: 11 MMOL/L (ref 7–15)
AST SERPL W P-5'-P-CCNC: 9 U/L (ref 0–45)
BASOPHILS # BLD AUTO: 0 10E3/UL (ref 0–0.2)
BASOPHILS NFR BLD AUTO: 0 %
BILIRUB SERPL-MCNC: 0.4 MG/DL
BUN SERPL-MCNC: 27.8 MG/DL (ref 8–23)
CALCIUM SERPL-MCNC: 9.3 MG/DL (ref 8.8–10.2)
CHLORIDE SERPL-SCNC: 105 MMOL/L (ref 98–107)
CREAT SERPL-MCNC: 0.6 MG/DL (ref 0.51–0.95)
DEPRECATED HCO3 PLAS-SCNC: 24 MMOL/L (ref 22–29)
EOSINOPHIL # BLD AUTO: 0 10E3/UL (ref 0–0.7)
EOSINOPHIL NFR BLD AUTO: 1 %
ERYTHROCYTE [DISTWIDTH] IN BLOOD BY AUTOMATED COUNT: 13.9 % (ref 10–15)
GFR SERPL CREATININE-BSD FRML MDRD: 87 ML/MIN/1.73M2
GLUCOSE SERPL-MCNC: 140 MG/DL (ref 70–99)
HCT VFR BLD AUTO: 33.2 % (ref 35–47)
HGB BLD-MCNC: 11 G/DL (ref 11.7–15.7)
IMM GRANULOCYTES # BLD: 0.1 10E3/UL
IMM GRANULOCYTES NFR BLD: 1 %
LYMPHOCYTES # BLD AUTO: 0.9 10E3/UL (ref 0.8–5.3)
LYMPHOCYTES NFR BLD AUTO: 16 %
MCH RBC QN AUTO: 31.5 PG (ref 26.5–33)
MCHC RBC AUTO-ENTMCNC: 33.1 G/DL (ref 31.5–36.5)
MCV RBC AUTO: 95 FL (ref 78–100)
MONOCYTES # BLD AUTO: 0.9 10E3/UL (ref 0–1.3)
MONOCYTES NFR BLD AUTO: 16 %
NEUTROPHILS # BLD AUTO: 3.6 10E3/UL (ref 1.6–8.3)
NEUTROPHILS NFR BLD AUTO: 66 %
NRBC # BLD AUTO: 0 10E3/UL
NRBC BLD AUTO-RTO: 0 /100
PLATELET # BLD AUTO: 132 10E3/UL (ref 150–450)
POTASSIUM SERPL-SCNC: 4.1 MMOL/L (ref 3.4–5.3)
PROT SERPL-MCNC: 6.7 G/DL (ref 6.4–8.3)
RBC # BLD AUTO: 3.49 10E6/UL (ref 3.8–5.2)
SODIUM SERPL-SCNC: 140 MMOL/L (ref 136–145)
TOTAL PROTEIN SERUM FOR ELP: 6.5 G/DL (ref 6.4–8.3)
WBC # BLD AUTO: 5.5 10E3/UL (ref 4–11)

## 2023-06-26 PROCEDURE — 82784 ASSAY IGA/IGD/IGG/IGM EACH: CPT | Performed by: INTERNAL MEDICINE

## 2023-06-26 PROCEDURE — 84165 PROTEIN E-PHORESIS SERUM: CPT | Mod: 26

## 2023-06-26 PROCEDURE — 84165 PROTEIN E-PHORESIS SERUM: CPT | Mod: TC | Performed by: STUDENT IN AN ORGANIZED HEALTH CARE EDUCATION/TRAINING PROGRAM

## 2023-06-26 PROCEDURE — 36415 COLL VENOUS BLD VENIPUNCTURE: CPT

## 2023-06-26 PROCEDURE — 84155 ASSAY OF PROTEIN SERUM: CPT | Mod: 91 | Performed by: INTERNAL MEDICINE

## 2023-06-26 PROCEDURE — 80053 COMPREHEN METABOLIC PANEL: CPT | Performed by: INTERNAL MEDICINE

## 2023-06-26 PROCEDURE — 85004 AUTOMATED DIFF WBC COUNT: CPT | Performed by: INTERNAL MEDICINE

## 2023-06-26 PROCEDURE — 83521 IG LIGHT CHAINS FREE EACH: CPT | Performed by: INTERNAL MEDICINE

## 2023-06-26 NOTE — PROGRESS NOTES
Oral Chemotherapy Monitoring Program  Lab Follow Up    Reviewed lab results from 6/26/23.        4/3/2023     9:00 AM 4/24/2023     1:00 PM 5/15/2023    10:00 AM 5/24/2023     9:00 AM 5/31/2023     2:00 PM 6/19/2023    12:00 PM 6/26/2023    12:00 PM   ORAL CHEMOTHERAPY   Assessment Type Refill;Chart Review Refill;Chart Review Lab Monitoring Chart Review;Initial Work up New Teach Initial Follow up Lab Monitoring   Diagnosis Code Multiple Myeloma Multiple Myeloma Multiple Myeloma Multiple Myeloma Multiple Myeloma Multiple Myeloma Multiple Myeloma   Providers Dr Sohan Parry   Clinic Name/Location [facility]   Drug Name Revlimid (lenalidomide) Revlimid (lenalidomide) Revlimid (lenalidomide) Pomalyst (pomalidomide) Pomalyst (pomalidomide) Pomalyst (pomalidomide) Pomalyst (pomalidomide)   Dose 10 mg 10 mg 10 mg 4 mg 4 mg 4 mg 4 mg   Current Schedule Daily Daily Daily Daily Daily Daily Daily   Cycle Details 2 weeks on, 1 week off 2 weeks on, 1 week off 2 weeks on, 1 week off 3 weeks on, 1 week off 3 weeks on, 1 week off 3 weeks on, 1 week off 3 weeks on, 1 week off   Start Date of Last Cycle 3/20/2023 4/10/2023 4/10/2023 6/12/2023 6/12/2023 6/12/2023 6/12/2023   Planned next cycle start date 4/10/2023 5/1/2023 5/15/2023    7/10/2023   Adverse Effects Anemia;Thrombocytopenia  Anemia    Anemia;Thrombocytopenia   Anemia Grade 1  Grade 1    Grade 1   Pharmacist Intervention(anemia) No  No    No   Thrombocytopenia Grade 1      Grade 1   Pharmacist Intervention(thrombocytopenia) No      No   Any new drug interactions? No   No No     Is the dose as ordered appropriate for the patient? Yes  Yes Yes Yes         Labs:  _  Result Component Current Result Ref Range   Sodium 140 (6/26/2023) 136 - 145 mmol/L     _  Result Component Current Result Ref Range   Potassium 4.1 (6/26/2023) 3.4 - 5.3 mmol/L     _  Result Component Current  Result Ref Range   Calcium 9.3 (6/26/2023) 8.8 - 10.2 mg/dL     No results found for Mag within last 30 days.     No results found for Phos within last 30 days.     _  Result Component Current Result Ref Range   Albumin 4.0 (6/26/2023) 3.5 - 5.2 g/dL     _  Result Component Current Result Ref Range   Urea Nitrogen 27.8 (H) (6/26/2023) 8.0 - 23.0 mg/dL     _  Result Component Current Result Ref Range   Creatinine 0.60 (6/26/2023) 0.51 - 0.95 mg/dL     _  Result Component Current Result Ref Range   AST 9 (6/26/2023) 0 - 45 U/L     _  Result Component Current Result Ref Range   ALT 11 (6/26/2023) 0 - 50 U/L     _  Result Component Current Result Ref Range   Bilirubin Total 0.4 (6/26/2023) <=1.2 mg/dL     _  Result Component Current Result Ref Range   WBC Count 5.5 (6/26/2023) 4.0 - 11.0 10e3/uL     _  Result Component Current Result Ref Range   Hemoglobin 11.0 (L) (6/26/2023) 11.7 - 15.7 g/dL     _  Result Component Current Result Ref Range   Platelet Count 132 (L) (6/26/2023) 150 - 450 10e3/uL     _  Result Component Current Result Ref Range   Absolute Neutrophils 3.6 (6/26/2023) 1.6 - 8.3 10e3/uL        Assessment & Plan:  Results are concerning for grade 1 thrombocytopenia. Patient still meets parameters to proceed with therapy. Continue to monitor. Ok to proceed with pomalidomide.    Questions answered to patient's satisfaction.    Follow-Up:  7/10 - Appointment with Vivian Macedo PA-C + Labs Rachel Moniz, Luke  Hematology/Oncology Pharmacist [facility]  [phone]

## 2023-06-27 ENCOUNTER — TELEPHONE (OUTPATIENT)
Dept: PHARMACY | Facility: CLINIC | Age: 88
End: 2023-06-27
Payer: MEDICARE

## 2023-06-27 LAB
ALBUMIN SERPL ELPH-MCNC: 4 G/DL (ref 3.7–5.1)
ALPHA1 GLOB SERPL ELPH-MCNC: 0.4 G/DL (ref 0.2–0.4)
ALPHA2 GLOB SERPL ELPH-MCNC: 0.7 G/DL (ref 0.5–0.9)
B-GLOBULIN SERPL ELPH-MCNC: 0.6 G/DL (ref 0.6–1)
GAMMA GLOB SERPL ELPH-MCNC: 0.8 G/DL (ref 0.7–1.6)
IGG SERPL-MCNC: 946 MG/DL (ref 610–1616)
KAPPA LC FREE SER-MCNC: 33.86 MG/DL (ref 0.33–1.94)
KAPPA LC FREE/LAMBDA FREE SER NEPH: 89.11 {RATIO} (ref 0.26–1.65)
LAMBDA LC FREE SERPL-MCNC: 0.38 MG/DL (ref 0.57–2.63)
M PROTEIN SERPL ELPH-MCNC: 0.7 G/DL
PROT PATTERN SERPL ELPH-IMP: ABNORMAL

## 2023-06-27 NOTE — RESULT ENCOUNTER NOTE
Dear Ms. Dunaway,    [place] light chain is improving.    Please, call me with any questions.    Alex Parry MD

## 2023-06-28 ENCOUNTER — PATIENT OUTREACH (OUTPATIENT)
Dept: ONCOLOGY | Facility: CLINIC | Age: 88
End: 2023-06-28
Payer: MEDICARE

## 2023-06-28 ENCOUNTER — DOCUMENTATION ONLY (OUTPATIENT)
Dept: PHARMACY | Facility: CLINIC | Age: 88
End: 2023-06-28
Payer: MEDICARE

## 2023-06-28 ENCOUNTER — TELEPHONE (OUTPATIENT)
Dept: PHARMACY | Facility: CLINIC | Age: 88
End: 2023-06-28
Payer: MEDICARE

## 2023-06-28 RX ORDER — DEXAMETHASONE 4 MG/1
4 TABLET ORAL WEEKLY
Qty: 16 TABLET | Refills: 2 | Status: SHIPPED | OUTPATIENT
Start: 2023-07-10 | End: 2023-12-26

## 2023-06-28 NOTE — TELEPHONE ENCOUNTER
Oral Chemotherapy Monitoring Program   Medication: Pomalyst  Rx: 4mg PO daily on days 1 through 14 of 21 day cycle   Auth #:  [id number] obtained on 6/28/2023  Risk Category: adult female of non child bearing potential  Routine survey questions reviewed.   Rx to be Escribed to [facility]     Terrie Bowen [facility] Oncology Pharmacy Liaison  [phone]

## 2023-06-28 NOTE — PROGRESS NOTES
Oral Chemotherapy Monitoring Program    Spoke with patient's daughter over the phone about worsening fatigue/weakness. The patient's fatigue has started to limit her ability to perform daily activities and she now has to rest most of the day. She describes the fatigue as being significantly more limiting than fatigue she experienced with previous lenalidomide therapy. Patient will continue to monitor energy levels during upcoming off week of therapy and will discuss possible dose modifications at provider appointment on 7/10.     Rachel Moniz, PharmD  Hematology/Oncology Pharmacist Intern  [facility]  [phone]

## 2023-06-28 NOTE — PROGRESS NOTES
Patient's daughter Regi called and LVM requesting a return call regarding recent lab results.      Writer returned call to Regi and reviewed lab results and she was pleased to hear that the Norma Parris light chains are improving, but reports that patient has been feeling poorly with weakness and fatigue. She wonders if patient possibly needs an adjustment of the Pomalyst dose or regimen that she started just over 2 weeks ago.     Writer will have pharmacy reach out to Regi to assess further and answer questions.     Alecia Briggs RN

## 2023-07-10 ENCOUNTER — INFUSION THERAPY VISIT (OUTPATIENT)
Dept: INFUSION THERAPY | Facility: CLINIC | Age: 88
End: 2023-07-10
Attending: NURSE PRACTITIONER
Payer: MEDICARE

## 2023-07-10 ENCOUNTER — ONCOLOGY VISIT (OUTPATIENT)
Dept: ONCOLOGY | Facility: CLINIC | Age: 88
End: 2023-07-10
Attending: PHYSICIAN ASSISTANT
Payer: MEDICARE

## 2023-07-10 VITALS
OXYGEN SATURATION: 90 % | TEMPERATURE: 98 F | SYSTOLIC BLOOD PRESSURE: 134 MMHG | RESPIRATION RATE: 16 BRPM | DIASTOLIC BLOOD PRESSURE: 75 MMHG | BODY MASS INDEX: 24.14 KG/M2 | WEIGHT: 132 LBS | HEART RATE: 86 BPM

## 2023-07-10 DIAGNOSIS — E05.20 TOXIC MULTINODUL GOITER: ICD-10-CM

## 2023-07-10 DIAGNOSIS — C90.00 MULTIPLE MYELOMA NOT HAVING ACHIEVED REMISSION (H): Primary | ICD-10-CM

## 2023-07-10 LAB
ACANTHOCYTES BLD QL SMEAR: SLIGHT
ALBUMIN SERPL BCG-MCNC: 3.8 G/DL (ref 3.5–5.2)
ALP SERPL-CCNC: 81 U/L (ref 35–104)
ALT SERPL W P-5'-P-CCNC: 6 U/L (ref 0–50)
ANION GAP SERPL CALCULATED.3IONS-SCNC: 10 MMOL/L (ref 7–15)
AST SERPL W P-5'-P-CCNC: 11 U/L (ref 0–45)
BASOPHILS # BLD MANUAL: 0 10E3/UL (ref 0–0.2)
BASOPHILS NFR BLD MANUAL: 1 %
BILIRUB SERPL-MCNC: 0.5 MG/DL
BUN SERPL-MCNC: 22.8 MG/DL (ref 8–23)
CALCIUM SERPL-MCNC: 8.5 MG/DL (ref 8.8–10.2)
CHLORIDE SERPL-SCNC: 107 MMOL/L (ref 98–107)
CREAT SERPL-MCNC: 0.7 MG/DL (ref 0.51–0.95)
DEPRECATED HCO3 PLAS-SCNC: 25 MMOL/L (ref 22–29)
ELLIPTOCYTES BLD QL SMEAR: ABNORMAL
EOSINOPHIL # BLD MANUAL: 0.1 10E3/UL (ref 0–0.7)
EOSINOPHIL NFR BLD MANUAL: 2 %
ERYTHROCYTE [DISTWIDTH] IN BLOOD BY AUTOMATED COUNT: 14 % (ref 10–15)
GFR SERPL CREATININE-BSD FRML MDRD: 84 ML/MIN/1.73M2
GLUCOSE SERPL-MCNC: 110 MG/DL (ref 70–99)
HCT VFR BLD AUTO: 30.6 % (ref 35–47)
HGB BLD-MCNC: 10 G/DL (ref 11.7–15.7)
LYMPHOCYTES # BLD MANUAL: 1.6 10E3/UL (ref 0.8–5.3)
LYMPHOCYTES NFR BLD MANUAL: 57 %
MCH RBC QN AUTO: 31.2 PG (ref 26.5–33)
MCHC RBC AUTO-ENTMCNC: 32.7 G/DL (ref 31.5–36.5)
MCV RBC AUTO: 95 FL (ref 78–100)
MONOCYTES # BLD MANUAL: 0.5 10E3/UL (ref 0–1.3)
MONOCYTES NFR BLD MANUAL: 18 %
NEUTROPHILS # BLD MANUAL: 0.6 10E3/UL (ref 1.6–8.3)
NEUTROPHILS NFR BLD MANUAL: 22 %
PLAT MORPH BLD: ABNORMAL
PLATELET # BLD AUTO: 157 10E3/UL (ref 150–450)
POTASSIUM SERPL-SCNC: 3.9 MMOL/L (ref 3.4–5.3)
PROT SERPL-MCNC: 6.3 G/DL (ref 6.4–8.3)
RBC # BLD AUTO: 3.21 10E6/UL (ref 3.8–5.2)
RBC MORPH BLD: ABNORMAL
SODIUM SERPL-SCNC: 142 MMOL/L (ref 136–145)
WBC # BLD AUTO: 2.8 10E3/UL (ref 4–11)

## 2023-07-10 PROCEDURE — 36415 COLL VENOUS BLD VENIPUNCTURE: CPT

## 2023-07-10 PROCEDURE — G0463 HOSPITAL OUTPT CLINIC VISIT: HCPCS | Performed by: PHYSICIAN ASSISTANT

## 2023-07-10 PROCEDURE — 85014 HEMATOCRIT: CPT | Performed by: NURSE PRACTITIONER

## 2023-07-10 PROCEDURE — 99213 OFFICE O/P EST LOW 20 MIN: CPT | Performed by: PHYSICIAN ASSISTANT

## 2023-07-10 PROCEDURE — G0463 HOSPITAL OUTPT CLINIC VISIT: HCPCS | Mod: 25 | Performed by: PHYSICIAN ASSISTANT

## 2023-07-10 PROCEDURE — 85007 BL SMEAR W/DIFF WBC COUNT: CPT | Performed by: NURSE PRACTITIONER

## 2023-07-10 PROCEDURE — 250N000011 HC RX IP 250 OP 636: Mod: JZ | Performed by: PHYSICIAN ASSISTANT

## 2023-07-10 PROCEDURE — 80053 COMPREHEN METABOLIC PANEL: CPT | Performed by: NURSE PRACTITIONER

## 2023-07-10 PROCEDURE — 84443 ASSAY THYROID STIM HORMONE: CPT | Performed by: NURSE PRACTITIONER

## 2023-07-10 PROCEDURE — 250N000012 HC RX MED GY IP 250 OP 636 PS 637: Performed by: PHYSICIAN ASSISTANT

## 2023-07-10 PROCEDURE — 96401 CHEMO ANTI-NEOPL SQ/IM: CPT

## 2023-07-10 RX ORDER — DIPHENHYDRAMINE HCL 25 MG
50 CAPSULE ORAL
Status: CANCELLED | OUTPATIENT
Start: 2023-07-10

## 2023-07-10 RX ORDER — HEPARIN SODIUM (PORCINE) LOCK FLUSH IV SOLN 100 UNIT/ML 100 UNIT/ML
5 SOLUTION INTRAVENOUS
Status: CANCELLED | OUTPATIENT
Start: 2023-07-10

## 2023-07-10 RX ORDER — MEPERIDINE HYDROCHLORIDE 25 MG/ML
25 INJECTION INTRAMUSCULAR; INTRAVENOUS; SUBCUTANEOUS EVERY 30 MIN PRN
Status: CANCELLED | OUTPATIENT
Start: 2023-07-10

## 2023-07-10 RX ORDER — METHYLPREDNISOLONE SODIUM SUCCINATE 125 MG/2ML
125 INJECTION, POWDER, LYOPHILIZED, FOR SOLUTION INTRAMUSCULAR; INTRAVENOUS
Status: CANCELLED
Start: 2023-07-10

## 2023-07-10 RX ORDER — ALBUTEROL SULFATE 90 UG/1
1-2 AEROSOL, METERED RESPIRATORY (INHALATION)
Status: CANCELLED
Start: 2023-07-10

## 2023-07-10 RX ORDER — LORAZEPAM 2 MG/ML
0.5 INJECTION INTRAMUSCULAR EVERY 4 HOURS PRN
Status: CANCELLED | OUTPATIENT
Start: 2023-07-10

## 2023-07-10 RX ORDER — ALBUTEROL SULFATE 0.83 MG/ML
2.5 SOLUTION RESPIRATORY (INHALATION)
Status: CANCELLED | OUTPATIENT
Start: 2023-07-10

## 2023-07-10 RX ORDER — HEPARIN SODIUM,PORCINE 10 UNIT/ML
5 VIAL (ML) INTRAVENOUS
Status: CANCELLED | OUTPATIENT
Start: 2023-07-10

## 2023-07-10 RX ORDER — DEXAMETHASONE 4 MG/1
12 TABLET ORAL
Status: DISCONTINUED | OUTPATIENT
Start: 2023-07-10 | End: 2023-07-10 | Stop reason: HOSPADM

## 2023-07-10 RX ORDER — ACETAMINOPHEN 325 MG/1
650 TABLET ORAL
Status: CANCELLED | OUTPATIENT
Start: 2023-07-10

## 2023-07-10 RX ORDER — DEXAMETHASONE 4 MG/1
12 TABLET ORAL
Status: CANCELLED | OUTPATIENT
Start: 2023-07-10

## 2023-07-10 RX ORDER — DIPHENHYDRAMINE HYDROCHLORIDE 50 MG/ML
50 INJECTION INTRAMUSCULAR; INTRAVENOUS
Status: CANCELLED
Start: 2023-07-10

## 2023-07-10 RX ORDER — EPINEPHRINE 1 MG/ML
0.3 INJECTION, SOLUTION INTRAMUSCULAR; SUBCUTANEOUS EVERY 5 MIN PRN
Status: CANCELLED | OUTPATIENT
Start: 2023-07-10

## 2023-07-10 RX ADMIN — DARATUMUMAB AND HYALURONIDASE-FIHJ (HUMAN RECOMBINANT) 1800 MG: 1800; 30000 INJECTION SUBCUTANEOUS at 11:39

## 2023-07-10 RX ADMIN — DEXAMETHASONE 4 MG: 4 TABLET ORAL at 11:34

## 2023-07-10 ASSESSMENT — PAIN SCALES - GENERAL: PAINLEVEL: NO PAIN (0)

## 2023-07-10 NOTE — PROGRESS NOTES
"Oncology Rooming Note    July 10, 2023 10:14 AM   Quiana Dunaway is a 87 year old female who presents for:    Chief Complaint   Patient presents with     Oncology Clinic Visit     Initial Vitals: There were no vitals taken for this visit. Estimated body mass index is 23.78 kg/m  as calculated from the following:    Height as of 6/12/23: 1.575 m (5' 2\").    Weight as of 6/12/23: 59 kg (130 lb). There is no height or weight on file to calculate BSA.  Data Unavailable Comment: Data Unavailable   No LMP recorded. Patient is postmenopausal.  Allergies reviewed: Yes  Medications reviewed: Yes    Medications: Medication refills not needed today.  Pharmacy name entered into [employer]:    [facility] - [place], [place] - [address]  [id number] - [place], [place] - [address] AT [facility]    Clinical concerns:  PA was notified.      Mariela Lyn CMA            "

## 2023-07-10 NOTE — LETTER
"    7/10/2023         RE: Quiana Dunaway  [address] [facility] [address]        Dear Colleague,    Thank you for referring your patient, Quiana Dunaway, to the [facility]. Please see a copy of my visit note below.    Oncology Rooming Note    July 10, 2023 10:14 AM   Quiana Dunaway is a 87 year old female who presents for:    Chief Complaint   Patient presents with     Oncology Clinic Visit     Initial Vitals: There were no vitals taken for this visit. Estimated body mass index is 23.78 kg/m  as calculated from the following:    Height as of 6/12/23: 1.575 m (5' 2\").    Weight as of 6/12/23: 59 kg (130 lb). There is no height or weight on file to calculate BSA.  Data Unavailable Comment: Data Unavailable   No LMP recorded. Patient is postmenopausal.  Allergies reviewed: Yes  Medications reviewed: Yes    Medications: Medication refills not needed today.  Pharmacy name entered into [facility]:    [facility] - [place], [place] - [address] [id number] - [place], [place] - [address] [facility]    Clinical concerns:  PA was notified.      Mariela Lyn, [facility]              Oncology/Hematology Visit Note  Jul 10, 2023    Reason for Visit: Follow up of multiple myeloma    Primary Oncologist: Dr. Parry  Oncologic History:  Mrs. Dunaway is a lady with multiple myeloma (IgG kappa). High risk, t(14;16).  1. On 05/01/2017, WBC of 3.4, hemoglobin of 10.2 and platelet of 154.  2. SPEP on 07/07/2017 revealed M-spike of 1.8.  3. Bone marrow biopsy on 07/12/2017 reveals kappa monotypic plasma cell population consistent with multiple myeloma.  Bone marrow is 70% cellular.  Plasma cell is 50-60%.     -There is gain of chromosome 1, 5, 9, 15, and 17.  There is translocation 14;16.   4.  On 07/18/2017:  -M-spike of 1.9.   -Immunofixation reveals monoclonal IgG kappa.    -IgG level of 2970.  IgA of 15 and IgM of 175. "    -Kappa free light chain of 67.7.  Lambda free light chain of 1.42.  Ratio of kappa to lambda of 47.71.    -Beta 2 microglobulin of 3.4.   5. PET scan on 07/24/2017 reveals several focal areas of increased FDG uptake consistent with multiple myeloma.  There is probable epithelial cyst in tail of the pancreas.  No associated abnormal FDG activity.  Left thyroid cysts or nodules without any FDG activity.  There is a 0.3 cm left upper lobe lung nodule.   6.  Velcade, Revlimid and dexamethasone between on 08/14/2017 and 04/30/2018. Velcade stopped.   -Revlimid and dexamethasone continued.  -Revlimid held between 12/27/2021 and 03/08/2022.  -Daratumumab added on 11/14/2022.  7. CT chest, abdomen, and pelvis on 12/30/2021 does not reveal any malignancy.  -Changed to daratumumab, pomalidomide and dexamethasone on 06/12/2023.  7. CT chest, abdomen, and pelvis on 12/30/2021 does not reveal any malignancy.  -Brain MRI on 12/13/2022  reveals 1.5 cm left parafalcine meningioma.    Interval History:  Doing okay. With cycle 1 Pomalyst had significant fatigue, not wanting to get out of beed and not eating as much. This was persistent throughout and improved with week off. No rash or dyspnea, but single episode of diarrhea. She has ongoing chronic vertigo type symptoms, unchanged. Baseline neck pain, improves with NSAID use.  Weight stable. No nausea GI side effects. No rash. Has cough/dyspnea chronically and follows with PCP.       Review of Systems:  A complete review of systems was negative except as noted in the HPI.     Past medical, Surgical, and social history:  Reviewed    Physical Examination:  /75   Pulse 86   Temp 98  F (36.7  C) (Oral)   Resp 16   Wt 59.9 kg (132 lb)   SpO2 90%   BMI 24.14 kg/m    Wt Readings from Last 10 Encounters:   07/10/23 59.9 kg (132 lb)   06/12/23 59 kg (130 lb)   06/07/23 59.9 kg (132 lb 1.6 oz)   05/15/23 59 kg (130 lb)   05/02/23 57.6 kg (127 lb)   05/01/23 57.8 kg (127 lb 6.4  oz)   04/28/23 57.6 kg (127 lb)   04/03/23 57.7 kg (127 lb 3.2 oz)   03/20/23 59.6 kg (131 lb 6.4 oz)   03/10/23 56.7 kg (125 lb)     General: Elderly patient in no acute distress.   Skin: Warm and dry. No abnormal ecchymosis or rashes.  Eyes: Anicteric.   ENT: Poor dentition. Edentulous to upper jaw, only about 6 lower teeth present and appear poor health no surrounding erythema or infectious signs to the soft tissue.   Lungs: Normal work of breathing.  Abdomen: Non-distended.  Extremities: No peripheral edema. Ambulates with some difficulty with walker.  Mental: Calm, cooperative, appropriate. Mood euthymic. Very positive and gracious.  Neuro: Mouth drooling likely due to dentition. Alert and oriented x 3.    Laboratory Data:  CBC, CMP reviewed.       Assessment and Plan:  Quiana Dunaawy is a 87 year old year old female with high risk (t14;16) kappa multiple myeloma.     # Multiple Myleoma  - Continue monthly Faspro subcutaneous every 2 weeks, switches to monthly after current cycle  - Continue dex 4 mg PO weekly  - Continue Pomalyst   - Due to start cycle 2 today, delay x 1 week for neutropenia and plan to repeat labs in 1 week  - Continue acyclovir and ASA  - Protein studies pending today, but kappa FLC overall downtrending  - Follow-up with Dr. Parry/ROSA MARIA each cycle     # Bone Health  - Would benefit from Zometa however unable to initiate due to likely ongoing/chronic dental issues and possible infections; patient not interested in completing extractions as it is too stressful on her body to recover    # Diarrhea, Chronic  - Continue Imodium PRN  - Consider fiber supplement due to diet limited to soft foods    # Insomnia  # Decreased Appetite  # Fatigue  - Continue Remeron    # Vertigo  # Dizzines  - Previously met with ENT, neurology consult pending (out to October)  - MRI brain negative 12/2022  - Recommend discussing with the PT that comes to her house today to consider Epley maneuver  - Continue meclizine  PRN, okay to take up to TID   - She does use Ativan, Compazine, Remeron which may contribute to dizziness    25 minutes spent on the date of the encounter doing chart review, review of test results, interpretation of tests, patient visit, documentation and discussion with other provider(s)     Vivian Macedo PA-C  Department of Hematology and Oncology  [facility]       Again, thank you for allowing me to participate in the care of your patient.        Sincerely,        VIVIAN MACEDO PA-C

## 2023-07-10 NOTE — PROGRESS NOTES
Infusion Nursing Note:  Quiana ANA Dunaway presents today for darzalex faspro.    Patient seen by provider today: Yes: Vivian   present during visit today: Not Applicable.    Note: Per Vivian, okay to give Darzalex faspro today; will hold pomolyst for one week/reduce dose and recheck labs next Monday. Patient aware.      Intravenous Access:  No Intravenous access/labs at this visit.    Treatment Conditions:  Lab Results   Component Value Date    HGB 10.0 (L) 07/10/2023    WBC 2.8 (L) 07/10/2023    ANEU 0.6 (L) 07/10/2023    ANEUTAUTO 3.6 06/26/2023     07/10/2023      Lab Results   Component Value Date     07/10/2023    POTASSIUM 3.9 07/10/2023    MAG 2.4 (H) 05/26/2020    CR 0.70 07/10/2023    HUMBERTO 8.5 (L) 07/10/2023    BILITOTAL 0.5 07/10/2023    ALBUMIN 3.8 07/10/2023    ALT 6 07/10/2023    AST 11 07/10/2023     Results reviewed, labs MET treatment parameters, ok to proceed with treatment.      Post Infusion Assessment:  Patient tolerated injection without incident.  Site patent and intact, free from redness, edema or discomfort.       Discharge Plan:   AVS to patient via MYCHART.  Patient will return as prev kirsty for next appointment.   Patient discharged in stable condition accompanied by: daughter-in-law.  Departure Mode: Ambulatory.      Jimy French RN

## 2023-07-10 NOTE — PROGRESS NOTES
Medical Assistant Note:  Quiana Dunaway presents today for blood draw.    Patient seen by provider today: Yes: mary.   present during visit today: Not Applicable.    Concerns: No Concerns.    Procedure:  Lab draw site: lac, Needle type: bf, Gauge: 23.    Post Assessment:  Labs drawn without difficulty: Yes.    Discharge Plan:  Departure Mode: Ambulatory.    Face to Face Time: 5 min.    Mariela Lyn, CMA

## 2023-07-10 NOTE — TELEPHONE ENCOUNTER
I called the [employer] call center to update the REMS auth to 3mg, they will use the same [employer] auth#.    Oral Chemotherapy Monitoring Program   Medication: Pomalyst  Rx: 3mg PO daily on days 1 through 14 of 21 day cycle   Auth #:  [id number] obtained on 7/10/2023  Risk Category: adult female of non child bearing potential  Routine survey questions reviewed.   Rx to be Escribed to [facility]     Terrie Bowen [facility] Oncology Pharmacy Liaison  [phone]

## 2023-07-12 ENCOUNTER — OFFICE VISIT (OUTPATIENT)
Dept: FAMILY MEDICINE | Facility: CLINIC | Age: 88
End: 2023-07-12
Payer: MEDICARE

## 2023-07-12 VITALS
HEART RATE: 83 BPM | RESPIRATION RATE: 16 BRPM | SYSTOLIC BLOOD PRESSURE: 138 MMHG | TEMPERATURE: 99 F | WEIGHT: 130 LBS | DIASTOLIC BLOOD PRESSURE: 74 MMHG | OXYGEN SATURATION: 96 % | BODY MASS INDEX: 23.92 KG/M2 | HEIGHT: 62 IN

## 2023-07-12 DIAGNOSIS — E53.8 VITAMIN B12 DEFICIENCY (NON ANEMIC): ICD-10-CM

## 2023-07-12 DIAGNOSIS — R42 DIZZINESS: ICD-10-CM

## 2023-07-12 DIAGNOSIS — I10 BENIGN ESSENTIAL HYPERTENSION: ICD-10-CM

## 2023-07-12 DIAGNOSIS — C90.00 MULTIPLE MYELOMA NOT HAVING ACHIEVED REMISSION (H): Primary | ICD-10-CM

## 2023-07-12 DIAGNOSIS — E05.20 TOXIC MULTINODUL GOITER: ICD-10-CM

## 2023-07-12 LAB — TSH SERPL DL<=0.005 MIU/L-ACNC: 1.48 UIU/ML (ref 0.3–4.2)

## 2023-07-12 PROCEDURE — 99213 OFFICE O/P EST LOW 20 MIN: CPT | Performed by: INTERNAL MEDICINE

## 2023-07-12 RX ORDER — MECLIZINE HYDROCHLORIDE 25 MG/1
25 TABLET ORAL 3 TIMES DAILY PRN
Qty: 270 TABLET | Refills: 3 | Status: SHIPPED | OUTPATIENT
Start: 2023-07-12 | End: 2024-07-18

## 2023-07-12 RX ORDER — LANOLIN ALCOHOL/MO/W.PET/CERES
1000 CREAM (GRAM) TOPICAL DAILY
Qty: 90 TABLET | Refills: 3 | Status: SHIPPED | OUTPATIENT
Start: 2023-07-12 | End: 2024-06-19

## 2023-07-12 ASSESSMENT — PAIN SCALES - GENERAL: PAINLEVEL: NO PAIN (0)

## 2023-07-12 NOTE — PROGRESS NOTES
"  Assessment & Plan     Multiple myeloma not having achieved remission (H)  Continue follow-up with hematology oncology    Toxic multinodul goiter  TSH is within normal limits, continue current methimazole  - TSH with free T4 reflex; Future    Dizziness  Refilled meclizine as this has provided a significant quality of life improvement.  Ideally, we would not want her to take so much meclizine, but all things considered, given the big picture, I think that this is reasonable to continue  - meclizine (ANTIVERT) 25 MG tablet; Take 1 tablet (25 mg) by mouth 3 times daily as needed for dizziness    Benign essential hypertension  Blood pressures well controlled in clinic today        15 minutes spent by me on the date of the encounter doing chart review, history and exam, documentation and further activities per the note           César Chung MD  [facility] MAGALY Araya is a 87 year old, presenting for the following health issues:  Follow Up         No data to display              HPI       87-year-old female with a history of multiple myeloma  Recently received a new chemotherapeutic agent by the hematology oncology group, it caused her some fatigue and so she is taking a week off this week and she feels better  She also has a toxic multinodular goiter and is on methimazole  I checked her TSH today and it is within the normal limits  She has chronic dizziness and takes a fair amount of meclizine to help with this  She has done physical therapy for this  The meclizine is very helpful and she asked for refill today  Otherwise she is doing well and has no new complaints      Review of Systems         Objective    /74 (BP Location: Left arm, Patient Position: Sitting, Cuff Size: Adult Regular)   Pulse 83   Temp 99  F (37.2  C) (Tympanic)   Resp 16   Ht 1.575 m (5' 2\")   Wt 59 kg (130 lb)   SpO2 96%   BMI 23.78 kg/m    Body mass index is 23.78 kg/m .  Physical Exam   General: " This is a well-appearing female in no acute distress.  Cardiovascular: The heart has a regular rate and rhythm.  Pulmonary: The lungs are clear to auscultation bilaterally.  Extremities: Without edema.  Neurological: Alert and oriented to person place and time, cranial nerves II to XII are grossly intact, normal gait.  Mental State: Appropriate mood and affect, well-groomed.

## 2023-07-12 NOTE — RESULT ENCOUNTER NOTE
The following letter pertains to your most recent diagnostic tests:    Good news! The TSH (thyroid stimulating hormone) level is normal which indicates normal circulating thyroid hormone levels.  Continue your current methimazole dose.        Sincerely,    Dr. Chung

## 2023-07-12 NOTE — TELEPHONE ENCOUNTER
Prescription approved per [facility] Refill Protocol.  Jacque Nascimento, RN  [facility] Triage Nurse

## 2023-07-13 ENCOUNTER — THERAPY VISIT (OUTPATIENT)
Dept: PHYSICAL THERAPY | Facility: CLINIC | Age: 88
End: 2023-07-13
Attending: INTERNAL MEDICINE
Payer: MEDICARE

## 2023-07-13 DIAGNOSIS — R42 VERTIGO: Primary | ICD-10-CM

## 2023-07-13 DIAGNOSIS — R26.81 GAIT INSTABILITY: ICD-10-CM

## 2023-07-13 DIAGNOSIS — R42 DIZZINESS: ICD-10-CM

## 2023-07-13 PROCEDURE — 97162 PT EVAL MOD COMPLEX 30 MIN: CPT | Mod: GP | Performed by: PHYSICAL THERAPIST

## 2023-07-13 PROCEDURE — 97110 THERAPEUTIC EXERCISES: CPT | Mod: GP | Performed by: PHYSICAL THERAPIST

## 2023-07-13 NOTE — PROGRESS NOTES
PHYSICAL THERAPY EVALUATION  Type of Visit: Evaluation    See electronic medical record for Abuse and Falls Screening details.    Subjective      Presenting condition or subjective complaint:   Quiana presents with chornic dizziness and weakness. Is treated for multiple myeloma and chemo has continued to make her weak. She takes meclizine daily to assist with management of dizziness. Reports that she has spinning every morning when she is getting out of bed. Will get up, take a pill and then will lie back down and feels better. Has had 1 fall  Date of onset: 02/27/23    Relevant medical history:     Past Medical History:   Diagnosis Date     Anxiety      HTN (hypertension)      Multiple myeloma not having achieved remission (H) 7/18/2017     Pacemaker     Placed due to symptomatic second degree AV Block     Pancytopenia (H) 6/28/2017     Paroxysmal atrial fibrillation (H)         Prior Level of Function   Transfers: Independent  Ambulation: Assistive equipment  ADL: Assistive equipment, Assistive person      Living Environment  Social support:   Lives with family  Type of home:   House  Stairs to enter the home:       2 steps to enter      Employment:    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 [place].     Patient goals for therapy:   Improve ability to care for self, reduce dizziness and weakness.    Pain assessment: bilateral shoulder pain from OA     Objective    Cognitive Status Examination  Orientation: Oriented to person, place and time   Level of Consciousness: Alert    OBSERVATION: Presents with daughter in law, using 2ww   POSTURE: forward head  RANGE OF MOTION: LE ROM WFL  STRENGTH: hip flexion 4-/5, knee extension 4/5, dorsiflexion 4+/5, abduction 4-/5    BED MOBILITY: unable to assess today due to dizziness    TRANSFERS: mod I    GAIT:   Gait Description: Ambulates with 2ww, no signs of instability and good pace.    BALANCE:     SPECIAL TESTS        Dynamic Gait  Index (DGI)   Unable to test today   Modified CTSIB Conditions (sec) Unable to test today due to dizziness with standing   5 time sit to stand Requires UE support: able to do 3 reps in 18.5 before dizziness increases     SENSATION: UE Sensation WNL, LE Sensation WNL      VESTIBULAR  Cervicogenic Screen    Neck ROM Limited R and L rotation, pt guarded     Oculomotor Screen    Ocular ROM Normal   Smooth Pursuit Abnormal mild saccadic intrusions   Saccades Normal   VOR Pt begins movement but stops quickly due to dizziness.   VOR Cancellation Not tested today   Head Impulse Test Not tested to   Convergence Testing Normal        Infrared Goggle Exam Vestibular Suppressant in Last 24 Hours? No  Exam Completed With: Infrared goggles   Spontaneous Nystagmus Negative   Gaze Evoked Nystagmus Horizontal R, Horizontal L with gaze, explainable by age   Head Shake Horizontal Nystagmus Not assessed today due to significant dizziness         Right                       Further vestibular assessment held at this time per Pt request due to increased dizziness and nausea.         Assessment & Plan   CLINICAL IMPRESSIONS   Medical Diagnosis: Gait instability    Treatment Diagnosis: Dizziness and deconditioning   Impression/Assessment: Patient is a 87 year old female with dizziness complaints.  The following significant findings have been identified: Pain, Decreased ROM/flexibility, Decreased strength, Impaired balance, Impaired gait, Impaired posture, Instability, Dizziness and Disequilibrium . These impairments interfere with their ability to perform self care tasks, recreational activities, household chores, driving , household mobility, community mobility and increased fall risk as compared to previous level of function.     Clinical Decision Making (Complexity):   Clinical Presentation: Evolving/Changing  Clinical Presentation Rationale: based on medical and personal factors listed in PT evaluation  Clinical Decision Making  (Complexity): Moderate complexity    PLAN OF CARE  Treatment Interventions:  Interventions: Gait Training, Manual Therapy, Neuromuscular Re-education, Therapeutic Activity, Therapeutic Exercise, Self-Care/Home Management, Canalith Repositioning (will request BPPV order if pt tests positive)    Long Term Goals     PT Goal 1  Goal Identifier: DHI  Goal Description: The pt will report a reduction in dizziness on the DHI to <20/100 indicating improvement in subjective dizziness and overall QOL  Goal Progress: baseline: 36/100  Target Date: 10/10/23  PT Goal 2  Goal Identifier: sit to stand  Goal Description: Due to improvements in LE strength and balance the pt will be able to complete 1 sit to stand without UE support, improving ability to stand from a variety of surfaces  Goal Progress: baseline: requires UE support  Target Date: 10/10/23  PT Goal 3  Goal Identifier: 6MWT  Goal Description: The pt will increase gait distance on 6MWT by 150' with LRAD in order to improve abiliy to ambulate in the community  Goal Progress: not tested at evaluation due to dizziness  Target Date: 10/10/23  PT Goal 4  Goal Identifier: HEP  Goal Description: The pt will be independent with an HEP in order to improve self management of symptoms  Target Date: 10/10/23      Frequency of Treatment: 1x/week  Duration of Treatment: up to 90 days      Education Assessment:   Learner/Method: Patient;Family  Education Comments: PT role POC    Risks and benefits of evaluation/treatment have been explained.   Patient/Family/caregiver agrees with Plan of Care.     Evaluation Time:     PT Eval, Moderate Complexity Minutes (26666): 20      Signing Clinician: Eugenia Pinedo, PT      [facility]                                                                                   OUTPATIENT PHYSICAL THERAPY      PLAN OF TREATMENT FOR OUTPATIENT REHABILITATION   Patient's Last Name, First Name, Quiana Taylor Date of  Birth:  1935   Provider's Name   [facility]   Medical Record No.  [id number]     Onset Date: 02/27/23  Start of Care Date: 07/13/23     Medical Diagnosis:  Gait instability      PT Treatment Diagnosis:  Dizziness and deconditioning Plan of Treatment  Frequency/Duration: 1x/week/ up to 90 days    Certification date from 07/13/23 to 10/10/23         See note for plan of treatment details and functional goals     Eugenia Pinedo, PT                         I CERTIFY THE NEED FOR THESE SERVICES FURNISHED UNDER        THIS PLAN OF TREATMENT AND WHILE UNDER MY CARE     (Physician attestation of this document indicates review and certification of the therapy plan).                  Referring Provider:  César Chung      Initial Assessment  See Epic Evaluation- Start of Care Date: 07/13/23

## 2023-07-17 ENCOUNTER — LAB (OUTPATIENT)
Dept: INFUSION THERAPY | Facility: CLINIC | Age: 88
End: 2023-07-17
Attending: INTERNAL MEDICINE
Payer: MEDICARE

## 2023-07-17 ENCOUNTER — DOCUMENTATION ONLY (OUTPATIENT)
Dept: PHARMACY | Facility: CLINIC | Age: 88
End: 2023-07-17

## 2023-07-17 DIAGNOSIS — C90.00 MULTIPLE MYELOMA NOT HAVING ACHIEVED REMISSION (H): ICD-10-CM

## 2023-07-17 LAB
BASOPHILS # BLD AUTO: 0.1 10E3/UL (ref 0–0.2)
BASOPHILS NFR BLD AUTO: 1 %
EOSINOPHIL # BLD AUTO: 0.1 10E3/UL (ref 0–0.7)
EOSINOPHIL NFR BLD AUTO: 1 %
ERYTHROCYTE [DISTWIDTH] IN BLOOD BY AUTOMATED COUNT: 14.2 % (ref 10–15)
HCT VFR BLD AUTO: 30.7 % (ref 35–47)
HGB BLD-MCNC: 10.1 G/DL (ref 11.7–15.7)
IMM GRANULOCYTES # BLD: 0 10E3/UL
IMM GRANULOCYTES NFR BLD: 1 %
LYMPHOCYTES # BLD AUTO: 0.5 10E3/UL (ref 0.8–5.3)
LYMPHOCYTES NFR BLD AUTO: 8 %
MCH RBC QN AUTO: 31 PG (ref 26.5–33)
MCHC RBC AUTO-ENTMCNC: 32.9 G/DL (ref 31.5–36.5)
MCV RBC AUTO: 94 FL (ref 78–100)
MONOCYTES # BLD AUTO: 0.5 10E3/UL (ref 0–1.3)
MONOCYTES NFR BLD AUTO: 7 %
NEUTROPHILS # BLD AUTO: 5.1 10E3/UL (ref 1.6–8.3)
NEUTROPHILS NFR BLD AUTO: 82 %
NRBC # BLD AUTO: 0 10E3/UL
NRBC BLD AUTO-RTO: 0 /100
PLATELET # BLD AUTO: 183 10E3/UL (ref 150–450)
RBC # BLD AUTO: 3.26 10E6/UL (ref 3.8–5.2)
WBC # BLD AUTO: 6.2 10E3/UL (ref 4–11)

## 2023-07-17 PROCEDURE — 85025 COMPLETE CBC W/AUTO DIFF WBC: CPT | Performed by: PHYSICIAN ASSISTANT

## 2023-07-17 PROCEDURE — 36415 COLL VENOUS BLD VENIPUNCTURE: CPT

## 2023-07-17 NOTE — PROGRESS NOTES
Nursing Note:  Quiana Dunaway presents today for Peripheral labs.    Patient seen by provider today: No   present during visit today: Not Applicable.    Note: Patient reports to feeling well with no new concerns.    Intravenous Access:  Lab draw site right arm, Needle type butterfly, Gauge 23.    Discharge Plan:   Patient was sent to home in stable condition with family.    Michelle Quintana RN

## 2023-07-17 NOTE — PROGRESS NOTES
Oral Chemotherapy Monitoring Program     I spoke with Quiana Mejia's daughter-in-law concerning Quiana's labs and starting up the pomalidamide therapy at 3 mg. They have the 3 mg on hand. We will follow-up in a week via a phone call to go over any new concerns or questions.     Thank you,  Anabela Vance, Pharmacy Intern   [facility]

## 2023-07-18 ENCOUNTER — TELEPHONE (OUTPATIENT)
Dept: ONCOLOGY | Facility: CLINIC | Age: 88
End: 2023-07-18
Payer: MEDICARE

## 2023-07-18 NOTE — CONFIDENTIAL NOTE
"Jackie calling to to let the team know that pt has an upper respiratory \"cold\" that she started experiencing last night. Her primary symptoms are nasal congestion and sneezing. She denies having fevers, weakness, chills, fatigue, lightheadedness, or signs of bleeding.    Jackie is wondering which OTC medications would be safe for pt to take with her current medications. Pt has not completed any Covid tests for symptoms.    Will route to provider for recommendations on OTC medications for pt.  "

## 2023-07-19 NOTE — TELEPHONE ENCOUNTER
Okay to take any over-the-counter cold medications that she would normally take. No known interactions/issues with her treatment.    Per Vivian PETTY      Writer called and spoke with patient's daughter, Jackie, and informed her that it was ok to use OTC          medication and to call if symptoms are not improving or she develops a fever.     Jackie, verbalized understanding.  Alecia Briggs RN

## 2023-07-24 ENCOUNTER — DOCUMENTATION ONLY (OUTPATIENT)
Dept: PHARMACY | Facility: CLINIC | Age: 88
End: 2023-07-24
Payer: MEDICARE

## 2023-07-24 NOTE — PROGRESS NOTES
Oral Chemotherapy Monitoring Program    Subjective/Objective:  Quiana Dunaway is a 88 year old female contacted by phone for a follow-up visit for oral chemotherapy. I spoke with her daughter-in-law Jackie. She stated that Quiana started taking the lower dose of pomalidomide (3 mg) on 7/17 and has been tolerating it much better. She stated that Quiana is more herself. Quiana is experiencing nausea, but it has been managed with zofran and compazine. She also has some diarrhea which occurs once a day but is managed with over the counter imodium. Quiana has also been experiencing fatigue as well but has been manageable. Jackie stated that these symptoms have been consistent since starting oral chemo and it has been manageable. Quiana recently had a cold and was on decongestant, but otherwise has not started any new medications.         4/24/2023     1:00 PM 5/15/2023    10:00 AM 5/24/2023     9:00 AM 5/31/2023     2:00 PM 6/19/2023    12:00 PM 6/26/2023    12:00 PM 7/24/2023    11:00 AM   ORAL CHEMOTHERAPY   Assessment Type Refill;Chart Review Lab Monitoring Chart Review;Initial Work up New Teach Initial Follow up Lab Monitoring Initial Follow up   Diagnosis Code Multiple Myeloma Multiple Myeloma Multiple Myeloma Multiple Myeloma Multiple Myeloma Multiple Myeloma Multiple Myeloma   Providers Dr Sohan Parry   Clinic Name/Location [facility]   Drug Name Revlimid (lenalidomide) Revlimid (lenalidomide) Pomalyst (pomalidomide) Pomalyst (pomalidomide) Pomalyst (pomalidomide) Pomalyst (pomalidomide) Pomalyst (pomalidomide)   Dose 10 mg 10 mg 4 mg 4 mg 4 mg 4 mg 3 mg   Current Schedule Daily Daily Daily Daily Daily Daily Daily   Cycle Details 2 weeks on, 1 week off 2 weeks on, 1 week off 3 weeks on, 1 week off 3 weeks on, 1 week off 3 weeks on, 1 week off 3 weeks on, 1 week off 3 weeks on, 1 week off   Start Date of Last Cycle  "4/10/2023 4/10/2023 6/12/2023 6/12/2023 6/12/2023 6/12/2023 7/17/2023   Planned next cycle start date 5/1/2023 5/15/2023    7/10/2023 8/8/2023   Doses missed in last 2 weeks       0   Adherence Assessment       Adherent   Adverse Effects  Anemia    Anemia;Thrombocytopenia Nausea;Diarrhea;Fatigue   Nausea       Grade 1   Pharmacist Intervention(nausea)       No   Diarrhea       Grade 1   Pharmacist Intervention(diarrhea)       No   Anemia  Grade 1    Grade 1    Pharmacist Intervention(anemia)  No    No    Fatigue       Grade 1   Pharmacist Intervention(fatigue)       No   Thrombocytopenia      Grade 1    Pharmacist Intervention(thrombocytopenia)      No    Any new drug interactions?   No No   Yes   Pharmacist Intervention?       No   Is the dose as ordered appropriate for the patient?  Yes Yes Yes   Yes       Last PHQ-2 Score on record:       4/24/2023    11:42 AM 4/24/2023    11:41 AM   PHQ-2 ( 1999 [employer])   Q1: Little interest or pleasure in doing things 1 0   Q2: Feeling down, depressed or hopeless 1 1   PHQ-2 Score 2 1   PHQ-2 Total Score (12-17 Years)- Positive if 3 or more points; Administer PHQ-A if positive 2 1   Q1: Little interest or pleasure in doing things Several days    Q2: Feeling down, depressed or hopeless Several days    PHQ-2 Score 2        Vitals:  BP:   BP Readings from Last 1 Encounters:   07/12/23 138/74     Wt Readings from Last 1 Encounters:   07/12/23 59 kg (130 lb)     Estimated body surface area is 1.61 meters squared as calculated from the following:    Height as of 7/12/23: 1.575 m (5' 2\").    Weight as of 7/12/23: 59 kg (130 lb).    Labs:  _  Result Component Current Result Ref Range   Sodium 142 (7/10/2023) 136 - 145 mmol/L     _  Result Component Current Result Ref Range   Potassium 3.9 (7/10/2023) 3.4 - 5.3 mmol/L     _  Result Component Current Result Ref Range   Calcium 8.5 (L) (7/10/2023) 8.8 - 10.2 mg/dL     No results found for Mag within last 30 days.     No results found for " Phos within last 30 days.     _  Result Component Current Result Ref Range   Albumin 3.8 (7/10/2023) 3.5 - 5.2 g/dL     _  Result Component Current Result Ref Range   Urea Nitrogen 22.8 (7/10/2023) 8.0 - 23.0 mg/dL     _  Result Component Current Result Ref Range   Creatinine 0.70 (7/10/2023) 0.51 - 0.95 mg/dL     _  Result Component Current Result Ref Range   AST 11 (7/10/2023) 0 - 45 U/L     _  Result Component Current Result Ref Range   ALT 6 (7/10/2023) 0 - 50 U/L     _  Result Component Current Result Ref Range   Bilirubin Total 0.5 (7/10/2023) <=1.2 mg/dL     _  Result Component Current Result Ref Range   WBC Count 6.2 (7/17/2023) 4.0 - 11.0 10e3/uL     _  Result Component Current Result Ref Range   Hemoglobin 10.1 (L) (7/17/2023) 11.7 - 15.7 g/dL     _  Result Component Current Result Ref Range   Platelet Count 183 (7/17/2023) 150 - 450 10e3/uL     _  Result Component Current Result Ref Range   Absolute Neutrophils 0.6 (L) (7/10/2023) 1.6 - 8.3 10e3/uL     _  Result Component Current Result Ref Range   Absolute Neutrophils 5.1 (7/17/2023) 1.6 - 8.3 10e3/uL          Assessment/Plan:  Quiana is good to continue her therapy as planned. There is a noted drug-drug interaction between the potassium chloride and compazine, but no pharmacist intervention is needed at this time. All of her adverse reactions of diarrhea, fatigue, and nausea are all grade 1 and have been properly managed.     Follow-Up:  1. 8/4/23 labs and appointment with Juaquin Livingston Due:  7/31/23    Thank you,  Anabela Vance, Pharmacy Intern   [facility]

## 2023-07-31 ENCOUNTER — THERAPY VISIT (OUTPATIENT)
Dept: PHYSICAL THERAPY | Facility: CLINIC | Age: 88
End: 2023-07-31
Attending: INTERNAL MEDICINE
Payer: MEDICARE

## 2023-07-31 DIAGNOSIS — R42 DIZZINESS: ICD-10-CM

## 2023-07-31 DIAGNOSIS — R26.81 GAIT INSTABILITY: ICD-10-CM

## 2023-07-31 DIAGNOSIS — R42 VERTIGO: Primary | ICD-10-CM

## 2023-07-31 DIAGNOSIS — N32.81 OVERACTIVE BLADDER: Primary | ICD-10-CM

## 2023-07-31 DIAGNOSIS — C90.00 MULTIPLE MYELOMA NOT HAVING ACHIEVED REMISSION (H): ICD-10-CM

## 2023-07-31 PROCEDURE — 97530 THERAPEUTIC ACTIVITIES: CPT | Mod: GP | Performed by: PHYSICAL THERAPIST

## 2023-07-31 NOTE — TELEPHONE ENCOUNTER
Hello pt is requesting the myrbetriq 50 mg I do not see this on the med list can we please get this sent over please and thank you

## 2023-08-01 DIAGNOSIS — C90.00 MULTIPLE MYELOMA NOT HAVING ACHIEVED REMISSION (H): Primary | ICD-10-CM

## 2023-08-01 RX ORDER — MIRABEGRON 50 MG/1
50 TABLET, EXTENDED RELEASE ORAL DAILY
Qty: 90 TABLET | Refills: 3 | Status: SHIPPED | OUTPATIENT
Start: 2023-08-01 | End: 2024-09-03

## 2023-08-01 NOTE — TELEPHONE ENCOUNTER
Medication was listed as discontinue during 12/21/22 office visit, not reason documented for discontinuation     Grace LIGHT, Triage RN  [facility]

## 2023-08-03 ENCOUNTER — TELEPHONE (OUTPATIENT)
Dept: PHARMACY | Facility: CLINIC | Age: 88
End: 2023-08-03
Payer: MEDICARE

## 2023-08-03 NOTE — TELEPHONE ENCOUNTER
Oral Chemotherapy Monitoring Program   Medication: Pomalyst  Rx: 3mg PO daily on days 1 through 14 of 21 day cycle   Auth #:  [id number] obtained on 8/3/23  Risk Category: adult female of non child bearing potential  Routine survey questions reviewed.   Rx to be Escribed to [facility]     Terrie Bowen [facility] Pharmacy Liaison  [phone]

## 2023-08-04 ENCOUNTER — ANCILLARY PROCEDURE (OUTPATIENT)
Dept: CARDIOLOGY | Facility: CLINIC | Age: 88
End: 2023-08-04
Attending: INTERNAL MEDICINE
Payer: MEDICARE

## 2023-08-04 ENCOUNTER — LAB (OUTPATIENT)
Dept: INFUSION THERAPY | Facility: CLINIC | Age: 88
End: 2023-08-04
Attending: INTERNAL MEDICINE
Payer: MEDICARE

## 2023-08-04 ENCOUNTER — VIRTUAL VISIT (OUTPATIENT)
Dept: ONCOLOGY | Facility: CLINIC | Age: 88
End: 2023-08-04
Attending: INTERNAL MEDICINE
Payer: MEDICARE

## 2023-08-04 DIAGNOSIS — Z95.0 CARDIAC PACEMAKER IN SITU: ICD-10-CM

## 2023-08-04 DIAGNOSIS — C90.00 MULTIPLE MYELOMA NOT HAVING ACHIEVED REMISSION (H): Primary | ICD-10-CM

## 2023-08-04 DIAGNOSIS — I49.5 SICK SINUS SYNDROME (H): ICD-10-CM

## 2023-08-04 LAB
ALBUMIN SERPL BCG-MCNC: 4 G/DL (ref 3.5–5.2)
ALP SERPL-CCNC: 85 U/L (ref 35–104)
ALT SERPL W P-5'-P-CCNC: 9 U/L (ref 0–50)
ANION GAP SERPL CALCULATED.3IONS-SCNC: 8 MMOL/L (ref 7–15)
AST SERPL W P-5'-P-CCNC: 9 U/L (ref 0–45)
BASOPHILS # BLD AUTO: 0 10E3/UL (ref 0–0.2)
BASOPHILS NFR BLD AUTO: 1 %
BILIRUB SERPL-MCNC: 0.6 MG/DL
BUN SERPL-MCNC: 20.1 MG/DL (ref 8–23)
CALCIUM SERPL-MCNC: 9.4 MG/DL (ref 8.8–10.2)
CHLORIDE SERPL-SCNC: 105 MMOL/L (ref 98–107)
CREAT SERPL-MCNC: 0.68 MG/DL (ref 0.51–0.95)
DEPRECATED HCO3 PLAS-SCNC: 28 MMOL/L (ref 22–29)
EOSINOPHIL # BLD AUTO: 0.2 10E3/UL (ref 0–0.7)
EOSINOPHIL NFR BLD AUTO: 8 %
ERYTHROCYTE [DISTWIDTH] IN BLOOD BY AUTOMATED COUNT: 14.6 % (ref 10–15)
GFR SERPL CREATININE-BSD FRML MDRD: 84 ML/MIN/1.73M2
GLUCOSE SERPL-MCNC: 88 MG/DL (ref 70–99)
HCT VFR BLD AUTO: 31.1 % (ref 35–47)
HGB BLD-MCNC: 10.2 G/DL (ref 11.7–15.7)
IMM GRANULOCYTES # BLD: 0 10E3/UL
IMM GRANULOCYTES NFR BLD: 0 %
LYMPHOCYTES # BLD AUTO: 1.5 10E3/UL (ref 0.8–5.3)
LYMPHOCYTES NFR BLD AUTO: 46 %
MCH RBC QN AUTO: 30.4 PG (ref 26.5–33)
MCHC RBC AUTO-ENTMCNC: 32.8 G/DL (ref 31.5–36.5)
MCV RBC AUTO: 93 FL (ref 78–100)
MONOCYTES # BLD AUTO: 0.5 10E3/UL (ref 0–1.3)
MONOCYTES NFR BLD AUTO: 17 %
NEUTROPHILS # BLD AUTO: 0.9 10E3/UL (ref 1.6–8.3)
NEUTROPHILS NFR BLD AUTO: 28 %
NRBC # BLD AUTO: 0 10E3/UL
NRBC BLD AUTO-RTO: 0 /100
PLATELET # BLD AUTO: 120 10E3/UL (ref 150–450)
POTASSIUM SERPL-SCNC: 4.2 MMOL/L (ref 3.4–5.3)
PROT SERPL-MCNC: 6.3 G/DL (ref 6.4–8.3)
RBC # BLD AUTO: 3.36 10E6/UL (ref 3.8–5.2)
SODIUM SERPL-SCNC: 141 MMOL/L (ref 136–145)
TOTAL PROTEIN SERUM FOR ELP: 6.1 G/DL (ref 6.4–8.3)
WBC # BLD AUTO: 3.2 10E3/UL (ref 4–11)

## 2023-08-04 PROCEDURE — 99442 PR PHYSICIAN TELEPHONE EVALUATION 11-20 MIN: CPT | Mod: 95 | Performed by: NURSE PRACTITIONER

## 2023-08-04 PROCEDURE — 36415 COLL VENOUS BLD VENIPUNCTURE: CPT

## 2023-08-04 PROCEDURE — 93296 REM INTERROG EVL PM/IDS: CPT | Performed by: INTERNAL MEDICINE

## 2023-08-04 PROCEDURE — 83521 IG LIGHT CHAINS FREE EACH: CPT | Performed by: INTERNAL MEDICINE

## 2023-08-04 PROCEDURE — 80053 COMPREHEN METABOLIC PANEL: CPT | Performed by: INTERNAL MEDICINE

## 2023-08-04 PROCEDURE — 84165 PROTEIN E-PHORESIS SERUM: CPT | Mod: 26

## 2023-08-04 PROCEDURE — 85025 COMPLETE CBC W/AUTO DIFF WBC: CPT | Performed by: INTERNAL MEDICINE

## 2023-08-04 PROCEDURE — 84165 PROTEIN E-PHORESIS SERUM: CPT | Mod: TC | Performed by: PATHOLOGY

## 2023-08-04 PROCEDURE — 82784 ASSAY IGA/IGD/IGG/IGM EACH: CPT | Performed by: INTERNAL MEDICINE

## 2023-08-04 PROCEDURE — 84155 ASSAY OF PROTEIN SERUM: CPT | Mod: 91 | Performed by: INTERNAL MEDICINE

## 2023-08-04 PROCEDURE — 93294 REM INTERROG EVL PM/LDLS PM: CPT | Performed by: INTERNAL MEDICINE

## 2023-08-04 NOTE — LETTER
8/4/2023         RE: Quiana Dunaway  [address] [facility] [address]        Dear Colleague,    Thank you for referring your patient, Quiana Dunaway, to the [facility]. Please see a copy of my visit note below.      Telephone visit --11 minutes      Oncology/Hematology Visit Note  Aug 4, 2023    Reason for Visit: follow up of multiple myeloma IgG kappa high risk  Diagnosed 2017  Patient Dr. Parry  Patient is undergoing treatment with daratumumab pomalidomide and dexamethasone    Interval History:  Patient reports she continues to feel weak.  Denies fever chills sweats cough shortness of breath chest pain denies nausea vomiting diarrhea abdominal pain or bleeding.  Denies urinary symptoms.  Denies edema      Review of Systems:  14 point ROS of systems including Constitutional, Eyes, Respiratory, Cardiovascular, Gastroenterology, Genitourinary, Integumentary, Muscularskeletal, Psychiatric were all negative except for pertinent positives noted in my HPI.    Physical Examination:  Telephone visit-no audible wheezing or cough patient answers all questions appropriately    LABS  -CBC CMP results reviewed    Assessment and Plan:    multiple myeloma IgG kappa high risk  Diagnosed 2017  Patient Dr. Parry  Patient is undergoing treatment with daratumumab, pomalidomide 3 mg- 3 weeks on 1 week off and 1 week off dexamethasone 4 mg once a week  -Patient is responding to treatment  Labs reviewed with patient and daughter-in-law.  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  Complications of neutropenia reviewed with patient including severe infections.  Avoid crowded places   signs and symptoms of infection reviewed with patient and daughter-in-law/  check temperature frequently in the event of fever, chills sweats or any signs symptoms infection go to ER-patient and daughter and all verbalized understanding  Recheck of CBC with differential on Monday when she comes for daratumumab and follow-up with me      Anemia   Patient denies bleeding  Overall stable hemoglobin    Thrombocytopenia  Secondary to Pomalyst (as above patient is holding Pomalyst)  Patient denies bleeding bruising  In the event of bleeding bruising fall or injury go to ER    Prophylaxis  Continue with aspirin and acyclovir    Bone health  Continue to hold Zometa until dental work-up is done    Generalized weakness  Chronic issue for the patient  Call our clinic with any changes or worsening of symptoms        Call our clinic with any changes in health condition questions    MARIUSZ Mckeon CNP  [facility]     Chart documentation with Dragon Voice recognition Software. Although reviewed after completion, some words and grammatical errors may remain.        Again, thank you for allowing me to participate in the care of your patient.        Sincerely,        MARIUSZ Mckeon CNP

## 2023-08-04 NOTE — PROGRESS NOTES
Medical Assistant Note:  Quiana Dunaway presents today for lab draw.    Patient seen by provider today: Yes: Juaquin NP.   present during visit today: Not Applicable.    Concerns: No Concerns.    Procedure:  Lab draw site: LAC, Needle type: BF, Gauge: 21. Gauze and coban applied    Post Assessment:  Labs drawn without difficulty: Yes.    Discharge Plan:  Departure Mode: Ambulatory.    Face to Face Time: 5.    Sultana Jones CMA

## 2023-08-04 NOTE — PROGRESS NOTES
Telephone visit --11 minutes      Oncology/Hematology Visit Note  Aug 4, 2023    Reason for Visit: follow up of multiple myeloma IgG kappa high risk  Diagnosed 2017  Patient Dr. Parry  Patient is undergoing treatment with daratumumab pomalidomide and dexamethasone    Interval History:  Patient reports she continues to feel weak.  Denies fever chills sweats cough shortness of breath chest pain denies nausea vomiting diarrhea abdominal pain or bleeding.  Denies urinary symptoms.  Denies edema      Review of Systems:  14 point ROS of systems including Constitutional, Eyes, Respiratory, Cardiovascular, Gastroenterology, Genitourinary, Integumentary, Muscularskeletal, Psychiatric were all negative except for pertinent positives noted in my HPI.    Physical Examination:  Telephone visit-no audible wheezing or cough patient answers all questions appropriately    LABS  -CBC CMP results reviewed    Assessment and Plan:    multiple myeloma IgG kappa high risk  Diagnosed 2017  Patient Dr. Parry  Patient is undergoing treatment with daratumumab, pomalidomide 3 mg- 3 weeks on 1 week off and 1 week off dexamethasone 4 mg once a week  -Patient is responding to treatment  Labs reviewed with patient and daughter-in-law.  Abnormalities discussed, ANC 0.9.  We typically do not hold Pomalyst for multiple myeloma unless ANC is less than 0.5 or neutropenic fever. However because of her multiple comorbidities and advanced age she is at very high risk for infection and developing complications from neutropenia.    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  Complications of neutropenia reviewed with patient including severe infections.  Avoid crowded places   signs and symptoms of infection reviewed with patient and daughter-in-law/  check temperature frequently in the event of fever, chills sweats or any signs symptoms infection go to ER-patient and daughter and all verbalized understanding  Recheck of CBC with differential on Monday when she comes for daratumumab and follow-up with me      Anemia   Patient denies bleeding  Overall stable hemoglobin    Thrombocytopenia  Secondary to Pomalyst (as above patient is holding Pomalyst)  Patient denies bleeding bruising  In the event of bleeding bruising fall or injury go to ER    Prophylaxis  Continue with aspirin and acyclovir    Bone health  Continue to hold Zometa until dental work-up is done    Generalized weakness  Chronic issue for the patient  Call our clinic with any changes or worsening of symptoms        Call our clinic with any changes in health condition questions    MARIUSZ Mckeon [facility]     Chart documentation with Dragon Voice recognition Software. Although reviewed after completion, some words and grammatical errors may remain.

## 2023-08-07 ENCOUNTER — DOCUMENTATION ONLY (OUTPATIENT)
Dept: PHARMACY | Facility: CLINIC | Age: 88
End: 2023-08-07

## 2023-08-07 ENCOUNTER — INFUSION THERAPY VISIT (OUTPATIENT)
Dept: INFUSION THERAPY | Facility: CLINIC | Age: 88
End: 2023-08-07
Attending: INTERNAL MEDICINE
Payer: MEDICARE

## 2023-08-07 VITALS
HEART RATE: 68 BPM | SYSTOLIC BLOOD PRESSURE: 127 MMHG | HEIGHT: 62 IN | WEIGHT: 133 LBS | TEMPERATURE: 98.4 F | DIASTOLIC BLOOD PRESSURE: 60 MMHG | OXYGEN SATURATION: 98 % | BODY MASS INDEX: 24.48 KG/M2 | RESPIRATION RATE: 16 BRPM

## 2023-08-07 DIAGNOSIS — C90.01 MULTIPLE MYELOMA IN REMISSION (H): Primary | ICD-10-CM

## 2023-08-07 DIAGNOSIS — C90.00 MULTIPLE MYELOMA NOT HAVING ACHIEVED REMISSION (H): ICD-10-CM

## 2023-08-07 LAB
ALBUMIN SERPL BCG-MCNC: 4.2 G/DL (ref 3.5–5.2)
ALBUMIN SERPL ELPH-MCNC: 3.7 G/DL (ref 3.7–5.1)
ALP SERPL-CCNC: 81 U/L (ref 35–104)
ALPHA1 GLOB SERPL ELPH-MCNC: 0.4 G/DL (ref 0.2–0.4)
ALPHA2 GLOB SERPL ELPH-MCNC: 0.7 G/DL (ref 0.5–0.9)
ALT SERPL W P-5'-P-CCNC: 9 U/L (ref 0–50)
ANION GAP SERPL CALCULATED.3IONS-SCNC: 13 MMOL/L (ref 7–15)
AST SERPL W P-5'-P-CCNC: 9 U/L (ref 0–45)
B-GLOBULIN SERPL ELPH-MCNC: 0.6 G/DL (ref 0.6–1)
BASOPHILS # BLD MANUAL: 0 10E3/UL (ref 0–0.2)
BASOPHILS NFR BLD MANUAL: 1 %
BILIRUB SERPL-MCNC: 0.4 MG/DL
BUN SERPL-MCNC: 26.7 MG/DL (ref 8–23)
CALCIUM SERPL-MCNC: 8.7 MG/DL (ref 8.8–10.2)
CHLORIDE SERPL-SCNC: 105 MMOL/L (ref 98–107)
CREAT SERPL-MCNC: 0.76 MG/DL (ref 0.51–0.95)
DEPRECATED HCO3 PLAS-SCNC: 23 MMOL/L (ref 22–29)
ELLIPTOCYTES BLD QL SMEAR: SLIGHT
EOSINOPHIL # BLD MANUAL: 0 10E3/UL (ref 0–0.7)
EOSINOPHIL NFR BLD MANUAL: 1 %
ERYTHROCYTE [DISTWIDTH] IN BLOOD BY AUTOMATED COUNT: 14.8 % (ref 10–15)
GAMMA GLOB SERPL ELPH-MCNC: 0.8 G/DL (ref 0.7–1.6)
GFR SERPL CREATININE-BSD FRML MDRD: 75 ML/MIN/1.73M2
GLUCOSE SERPL-MCNC: 98 MG/DL (ref 70–99)
HCT VFR BLD AUTO: 31.7 % (ref 35–47)
HGB BLD-MCNC: 10.6 G/DL (ref 11.7–15.7)
IGG SERPL-MCNC: 850 MG/DL (ref 610–1616)
KAPPA LC FREE SER-MCNC: 47.82 MG/DL (ref 0.33–1.94)
KAPPA LC FREE/LAMBDA FREE SER NEPH: 71.37 {RATIO} (ref 0.26–1.65)
LAMBDA LC FREE SERPL-MCNC: 0.67 MG/DL (ref 0.57–2.63)
LYMPHOCYTES # BLD MANUAL: 1.5 10E3/UL (ref 0.8–5.3)
LYMPHOCYTES NFR BLD MANUAL: 43 %
M PROTEIN SERPL ELPH-MCNC: 0.6 G/DL
MCH RBC QN AUTO: 30.9 PG (ref 26.5–33)
MCHC RBC AUTO-ENTMCNC: 33.4 G/DL (ref 31.5–36.5)
MCV RBC AUTO: 92 FL (ref 78–100)
MONOCYTES # BLD MANUAL: 0.5 10E3/UL (ref 0–1.3)
MONOCYTES NFR BLD MANUAL: 15 %
NEUTROPHILS # BLD MANUAL: 1.4 10E3/UL (ref 1.6–8.3)
NEUTROPHILS NFR BLD MANUAL: 40 %
PLAT MORPH BLD: ABNORMAL
PLATELET # BLD AUTO: 130 10E3/UL (ref 150–450)
POTASSIUM SERPL-SCNC: 3.8 MMOL/L (ref 3.4–5.3)
PROT PATTERN SERPL ELPH-IMP: ABNORMAL
PROT SERPL-MCNC: 6.5 G/DL (ref 6.4–8.3)
RBC # BLD AUTO: 3.43 10E6/UL (ref 3.8–5.2)
RBC MORPH BLD: ABNORMAL
SODIUM SERPL-SCNC: 141 MMOL/L (ref 136–145)
WBC # BLD AUTO: 3.5 10E3/UL (ref 4–11)

## 2023-08-07 PROCEDURE — 85027 COMPLETE CBC AUTOMATED: CPT | Performed by: NURSE PRACTITIONER

## 2023-08-07 PROCEDURE — 250N000011 HC RX IP 250 OP 636: Mod: JZ | Performed by: NURSE PRACTITIONER

## 2023-08-07 PROCEDURE — 36415 COLL VENOUS BLD VENIPUNCTURE: CPT | Performed by: INTERNAL MEDICINE

## 2023-08-07 PROCEDURE — 96401 CHEMO ANTI-NEOPL SQ/IM: CPT

## 2023-08-07 PROCEDURE — 85007 BL SMEAR W/DIFF WBC COUNT: CPT | Performed by: NURSE PRACTITIONER

## 2023-08-07 PROCEDURE — 80053 COMPREHEN METABOLIC PANEL: CPT | Performed by: INTERNAL MEDICINE

## 2023-08-07 RX ORDER — ALBUTEROL SULFATE 90 UG/1
1-2 AEROSOL, METERED RESPIRATORY (INHALATION)
Status: CANCELLED
Start: 2023-08-07

## 2023-08-07 RX ORDER — ALBUTEROL SULFATE 0.83 MG/ML
2.5 SOLUTION RESPIRATORY (INHALATION)
Status: CANCELLED | OUTPATIENT
Start: 2023-08-07

## 2023-08-07 RX ORDER — DIPHENHYDRAMINE HCL 25 MG
50 CAPSULE ORAL
Status: CANCELLED | OUTPATIENT
Start: 2023-08-07

## 2023-08-07 RX ORDER — HEPARIN SODIUM (PORCINE) LOCK FLUSH IV SOLN 100 UNIT/ML 100 UNIT/ML
5 SOLUTION INTRAVENOUS
Status: CANCELLED | OUTPATIENT
Start: 2023-08-07

## 2023-08-07 RX ORDER — METHYLPREDNISOLONE SODIUM SUCCINATE 125 MG/2ML
125 INJECTION, POWDER, LYOPHILIZED, FOR SOLUTION INTRAMUSCULAR; INTRAVENOUS
Status: CANCELLED
Start: 2023-08-07

## 2023-08-07 RX ORDER — MEPERIDINE HYDROCHLORIDE 25 MG/ML
25 INJECTION INTRAMUSCULAR; INTRAVENOUS; SUBCUTANEOUS EVERY 30 MIN PRN
Status: CANCELLED | OUTPATIENT
Start: 2023-08-07

## 2023-08-07 RX ORDER — ACETAMINOPHEN 325 MG/1
650 TABLET ORAL
Status: CANCELLED | OUTPATIENT
Start: 2023-08-07

## 2023-08-07 RX ORDER — LORAZEPAM 2 MG/ML
0.5 INJECTION INTRAMUSCULAR EVERY 4 HOURS PRN
Status: CANCELLED | OUTPATIENT
Start: 2023-08-07

## 2023-08-07 RX ORDER — EPINEPHRINE 1 MG/ML
0.3 INJECTION, SOLUTION INTRAMUSCULAR; SUBCUTANEOUS EVERY 5 MIN PRN
Status: CANCELLED | OUTPATIENT
Start: 2023-08-07

## 2023-08-07 RX ORDER — HEPARIN SODIUM,PORCINE 10 UNIT/ML
5 VIAL (ML) INTRAVENOUS
Status: CANCELLED | OUTPATIENT
Start: 2023-08-07

## 2023-08-07 RX ORDER — DEXAMETHASONE 4 MG/1
12 TABLET ORAL
Status: CANCELLED | OUTPATIENT
Start: 2023-08-07

## 2023-08-07 RX ORDER — DIPHENHYDRAMINE HYDROCHLORIDE 50 MG/ML
50 INJECTION INTRAMUSCULAR; INTRAVENOUS
Status: CANCELLED
Start: 2023-08-07

## 2023-08-07 RX ADMIN — DARATUMUMAB AND HYALURONIDASE-FIHJ (HUMAN RECOMBINANT) 1800 MG: 1800; 30000 INJECTION SUBCUTANEOUS at 14:14

## 2023-08-07 NOTE — PROGRESS NOTES
Infusion Nursing Note:  Quiana PEREZ Emelyn presents today for C10D1  Darzalex Faspro+Labs  Patient seen by provider today: No   present during visit today: Not Applicable.    Note: Pt reports no new health changes or concerns. Pt is holding Pomalyst for now per Md recommendation. Injection give to R lower abdomen.    Intravenous Access:  Lab draw site L AC, Needle type Butterfly , Gauge 23  Labs drawn without difficulty.    Treatment Conditions:  Lab Results   Component Value Date    HGB 10.6 (L) 08/07/2023    WBC 3.5 (L) 08/07/2023    ANEU 1.4 (L) 08/07/2023    ANEUTAUTO 0.9 (L) 08/04/2023     (L) 08/07/2023        Lab Results   Component Value Date     08/07/2023    POTASSIUM 3.8 08/07/2023    MAG 2.4 (H) 05/26/2020    CR 0.76 08/07/2023    HUMBERTO 8.7 (L) 08/07/2023    BILITOTAL 0.4 08/07/2023    ALBUMIN 4.2 08/07/2023    ALT 9 08/07/2023    AST 9 08/07/2023       Results reviewed, labs MET treatment parameters, ok to proceed with treatment.      Post Infusion Assessment:  Patient tolerated blood draw without incident.  Patient tolerated injection without incident.  Site patent and intact, free from redness, edema or discomfort.  No evidence of extravasations.  Access discontinued per protocol.       Discharge Plan:   Patient declined prescription refills.  Discharge instructions reviewed with: Patient.  Patient and/or family verbalized understanding of discharge instructions and all questions answered.  AVS to patient via [employer]T.  Patient will return 9/5/23 for next appointment.   Patient discharged in stable condition accompanied by: daughter.  Departure Mode: Ambulatory+ walker      Aide Good, RN

## 2023-08-07 NOTE — PROGRESS NOTES
Oral Chemotherapy Monitoring Program  Lab Follow Up    Reviewed lab results from 8/7/23.        5/15/2023    10:00 AM 5/24/2023     9:00 AM 5/31/2023     2:00 PM 6/19/2023    12:00 PM 6/26/2023    12:00 PM 7/24/2023    11:00 AM 8/7/2023     3:00 PM   ORAL CHEMOTHERAPY   Assessment Type Lab Monitoring Chart Review;Initial Work up New Teach Initial Follow up Lab Monitoring Initial Follow up Lab Monitoring   Diagnosis Code Multiple Myeloma Multiple Myeloma Multiple Myeloma Multiple Myeloma Multiple Myeloma Multiple Myeloma Multiple Myeloma   Providers Dr Sohan Parry   Clinic Name/Location [facility]   Drug Name Revlimid (lenalidomide) Pomalyst (pomalidomide) Pomalyst (pomalidomide) Pomalyst (pomalidomide) Pomalyst (pomalidomide) Pomalyst (pomalidomide) Pomalyst (pomalidomide)   Dose 10 mg 4 mg 4 mg 4 mg 4 mg 3 mg 3 mg   Current Schedule Daily Daily Daily Daily Daily Daily Daily   Cycle Details 2 weeks on, 1 week off 3 weeks on, 1 week off 3 weeks on, 1 week off 3 weeks on, 1 week off 3 weeks on, 1 week off 3 weeks on, 1 week off 3 weeks on, 1 week off   Start Date of Last Cycle 4/10/2023 6/12/2023 6/12/2023 6/12/2023 6/12/2023 7/17/2023 7/17/2023   Planned next cycle start date 5/15/2023    7/10/2023 8/8/2023 8/14/2023   Doses missed in last 2 weeks      0    Adherence Assessment      Adherent    Adverse Effects Anemia    Anemia;Thrombocytopenia Nausea;Diarrhea;Fatigue Neutropenia;Thrombocytopenia   Nausea      Grade 1    Pharmacist Intervention(nausea)      No    Diarrhea      Grade 1    Pharmacist Intervention(diarrhea)      No    Anemia Grade 1    Grade 1     Pharmacist Intervention(anemia) No    No     Fatigue      Grade 1    Pharmacist Intervention(fatigue)      No    Neutropenia       Grade 1   Pharmacist Intervention(neutropenia)       No   Thrombocytopenia     Grade 1  Grade 1   Pharmacist  Intervention(thrombocytopenia)     No  No   Any new drug interactions?  No No   Yes    Pharmacist Intervention?      No    Is the dose as ordered appropriate for the patient? Yes Yes Yes   Yes        Labs:  _  Result Component Current Result Ref Range   Sodium 141 (8/7/2023) 136 - 145 mmol/L     _  Result Component Current Result Ref Range   Potassium 3.8 (8/7/2023) 3.4 - 5.3 mmol/L     _  Result Component Current Result Ref Range   Calcium 8.7 (L) (8/7/2023) 8.8 - 10.2 mg/dL     No results found for Mag within last 30 days.     No results found for Phos within last 30 days.     _  Result Component Current Result Ref Range   Albumin 4.2 (8/7/2023) 3.5 - 5.2 g/dL     _  Result Component Current Result Ref Range   Urea Nitrogen 26.7 (H) (8/7/2023) 8.0 - 23.0 mg/dL     _  Result Component Current Result Ref Range   Creatinine 0.76 (8/7/2023) 0.51 - 0.95 mg/dL     _  Result Component Current Result Ref Range   AST 9 (8/7/2023) 0 - 45 U/L     _  Result Component Current Result Ref Range   ALT 9 (8/7/2023) 0 - 50 U/L     _  Result Component Current Result Ref Range   Bilirubin Total 0.4 (8/7/2023) <=1.2 mg/dL     _  Result Component Current Result Ref Range   WBC Count 3.5 (L) (8/7/2023) 4.0 - 11.0 10e3/uL     _  Result Component Current Result Ref Range   Hemoglobin 10.6 (L) (8/7/2023) 11.7 - 15.7 g/dL     _  Result Component Current Result Ref Range   Platelet Count 130 (L) (8/7/2023) 150 - 450 10e3/uL     _  Result Component Current Result Ref Range   Absolute Neutrophils 1.4 (L) (8/7/2023) 1.6 - 8.3 10e3/uL     _  Result Component Current Result Ref Range   Absolute Neutrophils 0.9 (L) (8/4/2023) 1.6 - 8.3 10e3/uL        Assessment & Plan:  Results are concerning for grade 1 neutropenia and grade 1 thrombocytopenia. Per Manish, Pomalyst was halted and is due to restart 8/14 as long as ANC counts have improved. Labs requested for 8/14 from . Patient should continue to hold off taking Pomalyst until 8/14 labs  can be reviewed.     Questions answered to patient's satisfaction.    Follow-Up:  8/14 -Labs (hopefully)   9/5 - Manish visit     Jah Reynolds  Pharmacy Intern   [facility]   [phone]

## 2023-08-09 NOTE — RESULT ENCOUNTER NOTE
Dear Ms. Dunaway,    Blood tests are overall stable. We will monitor it.    Please, call me with any questions.    Alex Parry MD

## 2023-08-11 DIAGNOSIS — C90.00 MULTIPLE MYELOMA NOT HAVING ACHIEVED REMISSION (H): Primary | ICD-10-CM

## 2023-08-14 ENCOUNTER — LAB (OUTPATIENT)
Dept: INFUSION THERAPY | Facility: CLINIC | Age: 88
End: 2023-08-14
Attending: INTERNAL MEDICINE
Payer: MEDICARE

## 2023-08-14 DIAGNOSIS — C90.00 MULTIPLE MYELOMA NOT HAVING ACHIEVED REMISSION (H): ICD-10-CM

## 2023-08-14 DIAGNOSIS — C90.01 MULTIPLE MYELOMA IN REMISSION (H): Primary | ICD-10-CM

## 2023-08-14 LAB
BASOPHILS # BLD MANUAL: 0 10E3/UL (ref 0–0.2)
BASOPHILS NFR BLD MANUAL: 1 %
BURR CELLS BLD QL SMEAR: SLIGHT
DACRYOCYTES BLD QL SMEAR: SLIGHT
ELLIPTOCYTES BLD QL SMEAR: ABNORMAL
EOSINOPHIL # BLD MANUAL: 0 10E3/UL (ref 0–0.7)
EOSINOPHIL NFR BLD MANUAL: 0 %
ERYTHROCYTE [DISTWIDTH] IN BLOOD BY AUTOMATED COUNT: 14.6 % (ref 10–15)
HCT VFR BLD AUTO: 30.8 % (ref 35–47)
HGB BLD-MCNC: 10.2 G/DL (ref 11.7–15.7)
HOLD SPECIMEN: NORMAL
LYMPHOCYTES # BLD MANUAL: 1.2 10E3/UL (ref 0.8–5.3)
LYMPHOCYTES NFR BLD MANUAL: 24 %
MCH RBC QN AUTO: 30.6 PG (ref 26.5–33)
MCHC RBC AUTO-ENTMCNC: 33.1 G/DL (ref 31.5–36.5)
MCV RBC AUTO: 93 FL (ref 78–100)
METAMYELOCYTES # BLD MANUAL: 0 10E3/UL
METAMYELOCYTES NFR BLD MANUAL: 1 %
MONOCYTES # BLD MANUAL: 1.4 10E3/UL (ref 0–1.3)
MONOCYTES NFR BLD MANUAL: 28 %
NEUTROPHILS # BLD MANUAL: 2.3 10E3/UL (ref 1.6–8.3)
NEUTROPHILS NFR BLD MANUAL: 46 %
PLAT MORPH BLD: ABNORMAL
PLATELET # BLD AUTO: 204 10E3/UL (ref 150–450)
RBC # BLD AUTO: 3.33 10E6/UL (ref 3.8–5.2)
RBC MORPH BLD: ABNORMAL
WBC # BLD AUTO: 4.9 10E3/UL (ref 4–11)

## 2023-08-14 PROCEDURE — 85027 COMPLETE CBC AUTOMATED: CPT | Performed by: INTERNAL MEDICINE

## 2023-08-14 PROCEDURE — 36415 COLL VENOUS BLD VENIPUNCTURE: CPT

## 2023-08-14 PROCEDURE — 85007 BL SMEAR W/DIFF WBC COUNT: CPT | Performed by: INTERNAL MEDICINE

## 2023-08-15 ENCOUNTER — DOCUMENTATION ONLY (OUTPATIENT)
Dept: PHARMACY | Facility: CLINIC | Age: 88
End: 2023-08-15

## 2023-08-15 ENCOUNTER — OFFICE VISIT (OUTPATIENT)
Dept: FAMILY MEDICINE | Facility: CLINIC | Age: 88
End: 2023-08-15
Payer: MEDICARE

## 2023-08-15 VITALS
SYSTOLIC BLOOD PRESSURE: 122 MMHG | OXYGEN SATURATION: 95 % | BODY MASS INDEX: 23.92 KG/M2 | TEMPERATURE: 98.4 F | WEIGHT: 130 LBS | DIASTOLIC BLOOD PRESSURE: 76 MMHG | HEIGHT: 62 IN | HEART RATE: 88 BPM | RESPIRATION RATE: 16 BRPM

## 2023-08-15 DIAGNOSIS — C90.00 MULTIPLE MYELOMA NOT HAVING ACHIEVED REMISSION (H): ICD-10-CM

## 2023-08-15 DIAGNOSIS — I10 BENIGN ESSENTIAL HYPERTENSION: ICD-10-CM

## 2023-08-15 DIAGNOSIS — K13.79 MOUTH SORE: Primary | ICD-10-CM

## 2023-08-15 LAB
MDC_IDC_EPISODE_DTM: NORMAL
MDC_IDC_EPISODE_DURATION: 107 S
MDC_IDC_EPISODE_DURATION: 138 S
MDC_IDC_EPISODE_DURATION: 1442 S
MDC_IDC_EPISODE_DURATION: 180 S
MDC_IDC_EPISODE_DURATION: 191 S
MDC_IDC_EPISODE_DURATION: 218 S
MDC_IDC_EPISODE_DURATION: 26 S
MDC_IDC_EPISODE_DURATION: 34 S
MDC_IDC_EPISODE_DURATION: 42 S
MDC_IDC_EPISODE_DURATION: 49 S
MDC_IDC_EPISODE_DURATION: 52 S
MDC_IDC_EPISODE_DURATION: 60 S
MDC_IDC_EPISODE_DURATION: 64 S
MDC_IDC_EPISODE_DURATION: 689 S
MDC_IDC_EPISODE_DURATION: 82 S
MDC_IDC_EPISODE_ID: 53
MDC_IDC_EPISODE_ID: 54
MDC_IDC_EPISODE_ID: 55
MDC_IDC_EPISODE_ID: 56
MDC_IDC_EPISODE_ID: 57
MDC_IDC_EPISODE_ID: 58
MDC_IDC_EPISODE_ID: 59
MDC_IDC_EPISODE_ID: 60
MDC_IDC_EPISODE_ID: 61
MDC_IDC_EPISODE_ID: 62
MDC_IDC_EPISODE_ID: 63
MDC_IDC_EPISODE_ID: 64
MDC_IDC_EPISODE_ID: 65
MDC_IDC_EPISODE_ID: 66
MDC_IDC_EPISODE_ID: 67
MDC_IDC_EPISODE_TYPE: NORMAL
MDC_IDC_LEAD_IMPLANT_DT: NORMAL
MDC_IDC_LEAD_IMPLANT_DT: NORMAL
MDC_IDC_LEAD_LOCATION: NORMAL
MDC_IDC_LEAD_LOCATION: NORMAL
MDC_IDC_LEAD_LOCATION_DETAIL_1: NORMAL
MDC_IDC_LEAD_LOCATION_DETAIL_1: NORMAL
MDC_IDC_LEAD_MFG: NORMAL
MDC_IDC_LEAD_MFG: NORMAL
MDC_IDC_LEAD_MODEL: NORMAL
MDC_IDC_LEAD_MODEL: NORMAL
MDC_IDC_LEAD_POLARITY_TYPE: NORMAL
MDC_IDC_LEAD_POLARITY_TYPE: NORMAL
MDC_IDC_LEAD_SERIAL: NORMAL
MDC_IDC_LEAD_SERIAL: NORMAL
MDC_IDC_MSMT_BATTERY_DTM: NORMAL
MDC_IDC_MSMT_BATTERY_REMAINING_LONGEVITY: 21 MO
MDC_IDC_MSMT_BATTERY_RRT_TRIGGER: 2.83
MDC_IDC_MSMT_BATTERY_STATUS: NORMAL
MDC_IDC_MSMT_BATTERY_VOLTAGE: 2.93 V
MDC_IDC_MSMT_LEADCHNL_RA_IMPEDANCE_VALUE: 323 OHM
MDC_IDC_MSMT_LEADCHNL_RA_IMPEDANCE_VALUE: 342 OHM
MDC_IDC_MSMT_LEADCHNL_RA_PACING_THRESHOLD_AMPLITUDE: 0.38 V
MDC_IDC_MSMT_LEADCHNL_RA_PACING_THRESHOLD_PULSEWIDTH: 0.4 MS
MDC_IDC_MSMT_LEADCHNL_RA_SENSING_INTR_AMPL: 1.88 MV
MDC_IDC_MSMT_LEADCHNL_RA_SENSING_INTR_AMPL: 1.88 MV
MDC_IDC_MSMT_LEADCHNL_RV_IMPEDANCE_VALUE: 361 OHM
MDC_IDC_MSMT_LEADCHNL_RV_IMPEDANCE_VALUE: 456 OHM
MDC_IDC_MSMT_LEADCHNL_RV_PACING_THRESHOLD_AMPLITUDE: 1.75 V
MDC_IDC_MSMT_LEADCHNL_RV_PACING_THRESHOLD_PULSEWIDTH: 0.4 MS
MDC_IDC_MSMT_LEADCHNL_RV_SENSING_INTR_AMPL: 12.25 MV
MDC_IDC_MSMT_LEADCHNL_RV_SENSING_INTR_AMPL: 12.25 MV
MDC_IDC_PG_IMPLANT_DTM: NORMAL
MDC_IDC_PG_MFG: NORMAL
MDC_IDC_PG_MODEL: NORMAL
MDC_IDC_PG_SERIAL: NORMAL
MDC_IDC_PG_TYPE: NORMAL
MDC_IDC_SESS_CLINIC_NAME: NORMAL
MDC_IDC_SESS_DTM: NORMAL
MDC_IDC_SESS_TYPE: NORMAL
MDC_IDC_SET_BRADY_AT_MODE_SWITCH_RATE: 171 {BEATS}/MIN
MDC_IDC_SET_BRADY_HYSTRATE: NORMAL
MDC_IDC_SET_BRADY_LOWRATE: 60 {BEATS}/MIN
MDC_IDC_SET_BRADY_MAX_SENSOR_RATE: 120 {BEATS}/MIN
MDC_IDC_SET_BRADY_MAX_TRACKING_RATE: 120 {BEATS}/MIN
MDC_IDC_SET_BRADY_MODE: NORMAL
MDC_IDC_SET_BRADY_PAV_DELAY_LOW: 300 MS
MDC_IDC_SET_BRADY_SAV_DELAY_LOW: 300 MS
MDC_IDC_SET_LEADCHNL_RA_PACING_AMPLITUDE: 1.5 V
MDC_IDC_SET_LEADCHNL_RA_PACING_ANODE_ELECTRODE_1: NORMAL
MDC_IDC_SET_LEADCHNL_RA_PACING_ANODE_LOCATION_1: NORMAL
MDC_IDC_SET_LEADCHNL_RA_PACING_CAPTURE_MODE: NORMAL
MDC_IDC_SET_LEADCHNL_RA_PACING_CATHODE_ELECTRODE_1: NORMAL
MDC_IDC_SET_LEADCHNL_RA_PACING_CATHODE_LOCATION_1: NORMAL
MDC_IDC_SET_LEADCHNL_RA_PACING_POLARITY: NORMAL
MDC_IDC_SET_LEADCHNL_RA_PACING_PULSEWIDTH: 0.4 MS
MDC_IDC_SET_LEADCHNL_RA_SENSING_ANODE_ELECTRODE_1: NORMAL
MDC_IDC_SET_LEADCHNL_RA_SENSING_ANODE_LOCATION_1: NORMAL
MDC_IDC_SET_LEADCHNL_RA_SENSING_CATHODE_ELECTRODE_1: NORMAL
MDC_IDC_SET_LEADCHNL_RA_SENSING_CATHODE_LOCATION_1: NORMAL
MDC_IDC_SET_LEADCHNL_RA_SENSING_POLARITY: NORMAL
MDC_IDC_SET_LEADCHNL_RA_SENSING_SENSITIVITY: 0.3 MV
MDC_IDC_SET_LEADCHNL_RV_PACING_AMPLITUDE: 4.25 V
MDC_IDC_SET_LEADCHNL_RV_PACING_ANODE_ELECTRODE_1: NORMAL
MDC_IDC_SET_LEADCHNL_RV_PACING_ANODE_LOCATION_1: NORMAL
MDC_IDC_SET_LEADCHNL_RV_PACING_CAPTURE_MODE: NORMAL
MDC_IDC_SET_LEADCHNL_RV_PACING_CATHODE_ELECTRODE_1: NORMAL
MDC_IDC_SET_LEADCHNL_RV_PACING_CATHODE_LOCATION_1: NORMAL
MDC_IDC_SET_LEADCHNL_RV_PACING_POLARITY: NORMAL
MDC_IDC_SET_LEADCHNL_RV_PACING_PULSEWIDTH: 0.4 MS
MDC_IDC_SET_LEADCHNL_RV_SENSING_ANODE_ELECTRODE_1: NORMAL
MDC_IDC_SET_LEADCHNL_RV_SENSING_ANODE_LOCATION_1: NORMAL
MDC_IDC_SET_LEADCHNL_RV_SENSING_CATHODE_ELECTRODE_1: NORMAL
MDC_IDC_SET_LEADCHNL_RV_SENSING_CATHODE_LOCATION_1: NORMAL
MDC_IDC_SET_LEADCHNL_RV_SENSING_POLARITY: NORMAL
MDC_IDC_SET_LEADCHNL_RV_SENSING_SENSITIVITY: 0.9 MV
MDC_IDC_SET_ZONE_DETECTION_INTERVAL: 350 MS
MDC_IDC_SET_ZONE_DETECTION_INTERVAL: 400 MS
MDC_IDC_SET_ZONE_TYPE: NORMAL
MDC_IDC_STAT_AT_BURDEN_PERCENT: 0 %
MDC_IDC_STAT_AT_DTM_END: NORMAL
MDC_IDC_STAT_AT_DTM_START: NORMAL
MDC_IDC_STAT_BRADY_AP_VP_PERCENT: 83.89 %
MDC_IDC_STAT_BRADY_AP_VS_PERCENT: 0.02 %
MDC_IDC_STAT_BRADY_AS_VP_PERCENT: 16.08 %
MDC_IDC_STAT_BRADY_AS_VS_PERCENT: 0.01 %
MDC_IDC_STAT_BRADY_DTM_END: NORMAL
MDC_IDC_STAT_BRADY_DTM_START: NORMAL
MDC_IDC_STAT_BRADY_RA_PERCENT_PACED: 83.85 %
MDC_IDC_STAT_BRADY_RV_PERCENT_PACED: 99.97 %
MDC_IDC_STAT_EPISODE_RECENT_COUNT: 0
MDC_IDC_STAT_EPISODE_RECENT_COUNT: 15
MDC_IDC_STAT_EPISODE_RECENT_COUNT_DTM_END: NORMAL
MDC_IDC_STAT_EPISODE_RECENT_COUNT_DTM_START: NORMAL
MDC_IDC_STAT_EPISODE_TOTAL_COUNT: 0
MDC_IDC_STAT_EPISODE_TOTAL_COUNT: 0
MDC_IDC_STAT_EPISODE_TOTAL_COUNT: 14
MDC_IDC_STAT_EPISODE_TOTAL_COUNT: 50
MDC_IDC_STAT_EPISODE_TOTAL_COUNT_DTM_END: NORMAL
MDC_IDC_STAT_EPISODE_TOTAL_COUNT_DTM_START: NORMAL
MDC_IDC_STAT_EPISODE_TYPE: NORMAL

## 2023-08-15 PROCEDURE — 99213 OFFICE O/P EST LOW 20 MIN: CPT | Performed by: INTERNAL MEDICINE

## 2023-08-15 RX ORDER — TRIAMCINOLONE ACETONIDE 0.1 %
PASTE (GRAM) DENTAL
Qty: 5 G | Refills: 3 | Status: SHIPPED | OUTPATIENT
Start: 2023-08-15 | End: 2024-06-04

## 2023-08-15 ASSESSMENT — PAIN SCALES - GENERAL: PAINLEVEL: MILD PAIN (2)

## 2023-08-15 NOTE — PROGRESS NOTES
Oral Chemotherapy Monitoring Program  Lab Follow Up    Reviewed lab results from 8/14/23.        5/24/2023     9:00 AM 5/31/2023     2:00 PM 6/19/2023    12:00 PM 6/26/2023    12:00 PM 7/24/2023    11:00 AM 8/7/2023     3:00 PM 8/15/2023     9:00 AM   ORAL CHEMOTHERAPY   Assessment Type Chart Review;Initial Work up New Teach Initial Follow up Lab Monitoring Initial Follow up Lab Monitoring Lab Monitoring   Diagnosis Code Multiple Myeloma Multiple Myeloma Multiple Myeloma Multiple Myeloma Multiple Myeloma Multiple Myeloma Multiple Myeloma   Providers Dr Sohan Parry   Clinic Name/Location [facility]   Drug Name Pomalyst (pomalidomide) Pomalyst (pomalidomide) Pomalyst (pomalidomide) Pomalyst (pomalidomide) Pomalyst (pomalidomide) Pomalyst (pomalidomide) Pomalyst (pomalidomide)   Dose 4 mg 4 mg 4 mg 4 mg 3 mg 3 mg 3 mg   Current Schedule Daily Daily Daily Daily Daily Daily Daily   Cycle Details 3 weeks on, 1 week off 3 weeks on, 1 week off 3 weeks on, 1 week off 3 weeks on, 1 week off 3 weeks on, 1 week off 3 weeks on, 1 week off 3 weeks on, 1 week off   Start Date of Last Cycle 6/12/2023 6/12/2023 6/12/2023 6/12/2023 7/17/2023 7/17/2023 7/17/2023   Planned next cycle start date    7/10/2023 8/8/2023 8/14/2023 8/15/2023   Doses missed in last 2 weeks     0     Adherence Assessment     Adherent     Adverse Effects    Anemia;Thrombocytopenia Nausea;Diarrhea;Fatigue Neutropenia;Thrombocytopenia Anemia   Nausea     Grade 1  Grade 1   Pharmacist Intervention(nausea)     No  No   Diarrhea     Grade 1     Pharmacist Intervention(diarrhea)     No     Anemia    Grade 1      Pharmacist Intervention(anemia)    No      Fatigue     Grade 1     Pharmacist Intervention(fatigue)     No     Neutropenia      Grade 1    Pharmacist Intervention(neutropenia)      No    Thrombocytopenia    Grade 1  Grade 1    Pharmacist  Intervention(thrombocytopenia)    No  No    Any new drug interactions? No No   Yes     Pharmacist Intervention?     No     Is the dose as ordered appropriate for the patient? Yes Yes   Yes         Labs:  _  Result Component Current Result Ref Range   Sodium 141 (8/7/2023) 136 - 145 mmol/L     _  Result Component Current Result Ref Range   Potassium 3.8 (8/7/2023) 3.4 - 5.3 mmol/L     _  Result Component Current Result Ref Range   Calcium 8.7 (L) (8/7/2023) 8.8 - 10.2 mg/dL     No results found for Mag within last 30 days.     No results found for Phos within last 30 days.     _  Result Component Current Result Ref Range   Albumin 4.2 (8/7/2023) 3.5 - 5.2 g/dL     _  Result Component Current Result Ref Range   Urea Nitrogen 26.7 (H) (8/7/2023) 8.0 - 23.0 mg/dL     _  Result Component Current Result Ref Range   Creatinine 0.76 (8/7/2023) 0.51 - 0.95 mg/dL     _  Result Component Current Result Ref Range   AST 9 (8/7/2023) 0 - 45 U/L     _  Result Component Current Result Ref Range   ALT 9 (8/7/2023) 0 - 50 U/L     _  Result Component Current Result Ref Range   Bilirubin Total 0.4 (8/7/2023) <=1.2 mg/dL     _  Result Component Current Result Ref Range   WBC Count 4.9 (8/14/2023) 4.0 - 11.0 10e3/uL     _  Result Component Current Result Ref Range   Hemoglobin 10.2 (L) (8/14/2023) 11.7 - 15.7 g/dL     _  Result Component Current Result Ref Range   Platelet Count 204 (8/14/2023) 150 - 450 10e3/uL     _  Result Component Current Result Ref Range   Absolute Neutrophils 2.3 (8/14/2023) 1.6 - 8.3 10e3/uL     _  Result Component Current Result Ref Range   Absolute Neutrophils 0.9 (L) (8/4/2023) 1.6 - 8.3 10e3/uL        Assessment & Plan:  Results are concerning for grade 1 anemia. However, no pharmacist intervention is needed at this time. ANC is 2.3. Quiana is okay to resume Pomalyst. Quiana's daughter-in-law Jackie was contacted over the phone 8/15 about Quiana resuming the pomalyst. She has no questions or concerns at this  time.     Questions answered to patient's satisfaction.    Follow-Up:  Labs and Juaquin Hammond on 9/5 at 9:00 am     Thank you,  Anabela Vance, Pharmacy Intern   [facility]

## 2023-08-15 NOTE — PROGRESS NOTES
"  Assessment & Plan     Mouth sore  She can try the Kenalog in  Orabase again to address the sore area of her left upper gum.  If that does not help enough, she could see a dentist or oral surgery.  The Kenalog in Orabase seem to help her tongue lesion in June.    Benign essential hypertension  Her blood pressure is well controlled in clinic today off lisinopril.  Given the concern for changes in volume status associated with receiving chemotherapy, I think the better part of mis would be to remain off of lisinopril for now.  Her family member was in agreement.    Multiple myeloma not having achieved remission (H)  Continue follow-up with hematology oncology regarding ongoing treatment for the myeloma      No LOS data to display   Time spent by me doing chart review, history and exam, documentation and further activities per the note       She appreciates short-term follow-up and we plan to see her back in September    César Chung MD  [facility] MAGALY Araya is a 88 year old, presenting for the following health issues:  Follow Up (1 month f/up)      HPI       Follow-up multiple myeloma, history of hypertension, hyper thyroidism, chronic vertigo    Overall, she feels well although it has been touching go with her chemotherapy recently    She complains of a sore on the upper left gum that has been present for several days    Also, she stopped taking lisinopril and her blood pressure readings have been normal      Review of Systems         Objective    /76 (BP Location: Left arm, Patient Position: Sitting, Cuff Size: Adult Regular)   Pulse 88   Temp 98.4  F (36.9  C) (Oral)   Resp 16   Ht 1.575 m (5' 2\")   Wt 59 kg (130 lb)   SpO2 95%   BMI 23.78 kg/m    Body mass index is 23.78 kg/m .  Physical Exam   General: This is a chronically ill-appearing elderly female although she does not appear toxic.  HEENT: There are no obvious oral ulcers or lesions.  The mucous membranes " are moist.   She has few teeth and an upper denture.

## 2023-08-17 ENCOUNTER — DOCUMENTATION ONLY (OUTPATIENT)
Dept: ONCOLOGY | Facility: CLINIC | Age: 88
End: 2023-08-17
Payer: MEDICARE

## 2023-08-17 NOTE — PROGRESS NOTES
Oral Chemotherapy Monitoring Program     Placed call to patient's daughter in law Jackie in follow up of Pomalidomide therapy for Quiana. Family and patient advocating for recent dose reduction from 4 mg down to 3 mg and now further regimen adjustment to 21 day cycle. Calling to inform of regimen change to Pomalidomide 3 mg daily x 14 days on, then take 7 days off. LVM on 8/17 pm. Will attempt to call again on 8/18 to inform of change.      8/18 call patient/family about Pomalidomide regimen change  9/5 labs, provider, and infusion appt (Pending start of next Pomalidomide cycle).     UPDATE:  Jackie contacted and is aware of the plan above. Pomalidomide 3 mg daily x 14 days on, then take 7 days off. Last cycle started 8/15 and they are aware to only take a total of 14 days of medication (will have extra supply). I have updated future refills to new 21 day cycle with 14 day supply of Pomalidomide.     Follow Up:  9/5 labs, provider, and infusion appt (Pending start of next Pomalidomide cycle).     Neno BonillaD  [facility] Oral [facility]  [phone]  August 17, 2023

## 2023-08-28 DIAGNOSIS — E05.90: ICD-10-CM

## 2023-08-29 RX ORDER — METHIMAZOLE 5 MG/1
2.5 TABLET ORAL DAILY
Qty: 45 TABLET | Refills: 3 | Status: SHIPPED | OUTPATIENT
Start: 2023-08-29 | End: 2024-02-20

## 2023-09-05 ENCOUNTER — ONCOLOGY VISIT (OUTPATIENT)
Dept: ONCOLOGY | Facility: CLINIC | Age: 88
End: 2023-09-05
Attending: INTERNAL MEDICINE
Payer: MEDICARE

## 2023-09-05 ENCOUNTER — LAB (OUTPATIENT)
Dept: INFUSION THERAPY | Facility: CLINIC | Age: 88
End: 2023-09-05
Attending: INTERNAL MEDICINE
Payer: MEDICARE

## 2023-09-05 ENCOUNTER — TELEPHONE (OUTPATIENT)
Dept: PHARMACY | Facility: CLINIC | Age: 88
End: 2023-09-05

## 2023-09-05 VITALS
OXYGEN SATURATION: 96 % | RESPIRATION RATE: 14 BRPM | HEART RATE: 68 BPM | SYSTOLIC BLOOD PRESSURE: 138 MMHG | BODY MASS INDEX: 24.36 KG/M2 | WEIGHT: 133.2 LBS | DIASTOLIC BLOOD PRESSURE: 85 MMHG | TEMPERATURE: 98 F

## 2023-09-05 DIAGNOSIS — C90.00 MULTIPLE MYELOMA NOT HAVING ACHIEVED REMISSION (H): Primary | ICD-10-CM

## 2023-09-05 LAB
ALBUMIN SERPL BCG-MCNC: 3.8 G/DL (ref 3.5–5.2)
ALP SERPL-CCNC: 65 U/L (ref 35–104)
ALT SERPL W P-5'-P-CCNC: 6 U/L (ref 0–50)
ANION GAP SERPL CALCULATED.3IONS-SCNC: 10 MMOL/L (ref 7–15)
AST SERPL W P-5'-P-CCNC: 10 U/L (ref 0–45)
BASOPHILS # BLD AUTO: 0.1 10E3/UL (ref 0–0.2)
BASOPHILS NFR BLD AUTO: 1 %
BILIRUB SERPL-MCNC: 0.7 MG/DL
BUN SERPL-MCNC: 23.2 MG/DL (ref 8–23)
CALCIUM SERPL-MCNC: 8.7 MG/DL (ref 8.8–10.2)
CHLORIDE SERPL-SCNC: 107 MMOL/L (ref 98–107)
CREAT SERPL-MCNC: 0.7 MG/DL (ref 0.51–0.95)
DEPRECATED HCO3 PLAS-SCNC: 24 MMOL/L (ref 22–29)
EOSINOPHIL # BLD AUTO: 0.2 10E3/UL (ref 0–0.7)
EOSINOPHIL NFR BLD AUTO: 4 %
ERYTHROCYTE [DISTWIDTH] IN BLOOD BY AUTOMATED COUNT: 15.1 % (ref 10–15)
GFR SERPL CREATININE-BSD FRML MDRD: 83 ML/MIN/1.73M2
GLUCOSE SERPL-MCNC: 83 MG/DL (ref 70–99)
HCT VFR BLD AUTO: 32.3 % (ref 35–47)
HGB BLD-MCNC: 10.7 G/DL (ref 11.7–15.7)
IMM GRANULOCYTES # BLD: 0 10E3/UL
IMM GRANULOCYTES NFR BLD: 0 %
LYMPHOCYTES # BLD AUTO: 2.3 10E3/UL (ref 0.8–5.3)
LYMPHOCYTES NFR BLD AUTO: 58 %
MCH RBC QN AUTO: 30.9 PG (ref 26.5–33)
MCHC RBC AUTO-ENTMCNC: 33.1 G/DL (ref 31.5–36.5)
MCV RBC AUTO: 93 FL (ref 78–100)
MONOCYTES # BLD AUTO: 0.8 10E3/UL (ref 0–1.3)
MONOCYTES NFR BLD AUTO: 20 %
NEUTROPHILS # BLD AUTO: 0.7 10E3/UL (ref 1.6–8.3)
NEUTROPHILS NFR BLD AUTO: 17 %
NRBC # BLD AUTO: 0 10E3/UL
NRBC BLD AUTO-RTO: 0 /100
PLATELET # BLD AUTO: 115 10E3/UL (ref 150–450)
POTASSIUM SERPL-SCNC: 4.2 MMOL/L (ref 3.4–5.3)
PROT SERPL-MCNC: 6.2 G/DL (ref 6.4–8.3)
RBC # BLD AUTO: 3.46 10E6/UL (ref 3.8–5.2)
SODIUM SERPL-SCNC: 141 MMOL/L (ref 136–145)
TOTAL PROTEIN SERUM FOR ELP: 6 G/DL (ref 6.4–8.3)
WBC # BLD AUTO: 4 10E3/UL (ref 4–11)

## 2023-09-05 PROCEDURE — 36415 COLL VENOUS BLD VENIPUNCTURE: CPT

## 2023-09-05 PROCEDURE — 80053 COMPREHEN METABOLIC PANEL: CPT | Performed by: INTERNAL MEDICINE

## 2023-09-05 PROCEDURE — 83521 IG LIGHT CHAINS FREE EACH: CPT | Mod: 59 | Performed by: INTERNAL MEDICINE

## 2023-09-05 PROCEDURE — 250N000011 HC RX IP 250 OP 636: Mod: JZ | Performed by: NURSE PRACTITIONER

## 2023-09-05 PROCEDURE — 85025 COMPLETE CBC W/AUTO DIFF WBC: CPT | Performed by: INTERNAL MEDICINE

## 2023-09-05 PROCEDURE — 99442 PR PHYSICIAN TELEPHONE EVALUATION 11-20 MIN: CPT | Mod: 95 | Performed by: NURSE PRACTITIONER

## 2023-09-05 PROCEDURE — 84165 PROTEIN E-PHORESIS SERUM: CPT | Mod: TC | Performed by: PATHOLOGY

## 2023-09-05 PROCEDURE — 84155 ASSAY OF PROTEIN SERUM: CPT | Performed by: INTERNAL MEDICINE

## 2023-09-05 PROCEDURE — 96401 CHEMO ANTI-NEOPL SQ/IM: CPT

## 2023-09-05 PROCEDURE — G0463 HOSPITAL OUTPT CLINIC VISIT: HCPCS | Mod: 25 | Performed by: NURSE PRACTITIONER

## 2023-09-05 PROCEDURE — 82784 ASSAY IGA/IGD/IGG/IGM EACH: CPT | Performed by: INTERNAL MEDICINE

## 2023-09-05 PROCEDURE — 84165 PROTEIN E-PHORESIS SERUM: CPT | Mod: 26

## 2023-09-05 RX ORDER — ALBUTEROL SULFATE 0.83 MG/ML
2.5 SOLUTION RESPIRATORY (INHALATION)
Status: CANCELLED | OUTPATIENT
Start: 2023-09-05

## 2023-09-05 RX ORDER — HEPARIN SODIUM (PORCINE) LOCK FLUSH IV SOLN 100 UNIT/ML 100 UNIT/ML
5 SOLUTION INTRAVENOUS
Status: CANCELLED | OUTPATIENT
Start: 2023-09-05

## 2023-09-05 RX ORDER — ACETAMINOPHEN 325 MG/1
650 TABLET ORAL
Status: CANCELLED | OUTPATIENT
Start: 2023-09-05

## 2023-09-05 RX ORDER — HEPARIN SODIUM,PORCINE 10 UNIT/ML
5 VIAL (ML) INTRAVENOUS
Status: CANCELLED | OUTPATIENT
Start: 2023-09-05

## 2023-09-05 RX ORDER — DIPHENHYDRAMINE HYDROCHLORIDE 50 MG/ML
50 INJECTION INTRAMUSCULAR; INTRAVENOUS
Status: CANCELLED
Start: 2023-09-05

## 2023-09-05 RX ORDER — DEXAMETHASONE 4 MG/1
12 TABLET ORAL
Status: CANCELLED | OUTPATIENT
Start: 2023-09-05

## 2023-09-05 RX ORDER — DIPHENHYDRAMINE HCL 25 MG
50 CAPSULE ORAL
Status: CANCELLED | OUTPATIENT
Start: 2023-09-05

## 2023-09-05 RX ORDER — LORAZEPAM 2 MG/ML
0.5 INJECTION INTRAMUSCULAR EVERY 4 HOURS PRN
Status: CANCELLED | OUTPATIENT
Start: 2023-09-05

## 2023-09-05 RX ORDER — EPINEPHRINE 1 MG/ML
0.3 INJECTION, SOLUTION INTRAMUSCULAR; SUBCUTANEOUS EVERY 5 MIN PRN
Status: CANCELLED | OUTPATIENT
Start: 2023-09-05

## 2023-09-05 RX ORDER — METHYLPREDNISOLONE SODIUM SUCCINATE 125 MG/2ML
125 INJECTION, POWDER, LYOPHILIZED, FOR SOLUTION INTRAMUSCULAR; INTRAVENOUS
Status: CANCELLED
Start: 2023-09-05

## 2023-09-05 RX ORDER — MEPERIDINE HYDROCHLORIDE 25 MG/ML
25 INJECTION INTRAMUSCULAR; INTRAVENOUS; SUBCUTANEOUS EVERY 30 MIN PRN
Status: CANCELLED | OUTPATIENT
Start: 2023-09-05

## 2023-09-05 RX ORDER — ALBUTEROL SULFATE 90 UG/1
1-2 AEROSOL, METERED RESPIRATORY (INHALATION)
Status: CANCELLED
Start: 2023-09-05

## 2023-09-05 RX ADMIN — DARATUMUMAB AND HYALURONIDASE-FIHJ (HUMAN RECOMBINANT) 1800 MG: 1800; 30000 INJECTION SUBCUTANEOUS at 10:01

## 2023-09-05 ASSESSMENT — PAIN SCALES - GENERAL: PAINLEVEL: NO PAIN (0)

## 2023-09-05 NOTE — LETTER
"    9/5/2023         RE: Quiana Dunaway  [address] [facility] [address]        Dear Colleague,    Thank you for referring your patient, Quiana Dunaway, to the [facility]. Please see a copy of my visit note below.    Oncology Rooming Note    September 5, 2023 8:38 AM   Quiana Dunaway is a 88 year old female who presents for:    Chief Complaint   Patient presents with     Oncology Clinic Visit     Initial Vitals: There were no vitals taken for this visit. Estimated body mass index is 23.78 kg/m  as calculated from the following:    Height as of 8/15/23: 1.575 m (5' 2\").    Weight as of 8/15/23: 59 kg (130 lb). There is no height or weight on file to calculate BSA.  Data Unavailable Comment: Data Unavailable   No LMP recorded. Patient is postmenopausal.  Allergies reviewed: Yes  Medications reviewed: Yes    Medications: Medication refills not needed today.  Pharmacy name entered into [facility]: [facility] - [place], [place] - [address]    Clinical concerns: feeling dizzy  Juaquin Hammond was notified.      Shari J. Schoenberger, DYANA                Telephone visit --11 minutes      Oncology/Hematology Visit Note  Sep 5, 2023    Reason for Visit: follow up of multiple myeloma IgG kappa high risk  Diagnosed 2017  Patient Dr. Parry  Patient is undergoing treatment with daratumumab pomalidomide and dexamethasone    Interval History:  Patient reports she continues to feel weak.  Denies fever chills sweats cough shortness of breath chest pain denies nausea vomiting diarrhea abdominal pain or bleeding.  Denies urinary symptoms.  Denies edema      Review of Systems:  14 point ROS of systems including Constitutional, Eyes, Respiratory, Cardiovascular, Gastroenterology, Genitourinary, Integumentary, Muscularskeletal, Psychiatric were all negative except for pertinent positives noted in my HPI.    Physical Examination:  Physical Exam  HENT:      Head: Normocephalic.    "   Right Ear: Tympanic membrane normal.      Nose: Nose normal.      Mouth/Throat:      Mouth: Mucous membranes are moist.   Eyes:      Pupils: Pupils are equal, round, and reactive to light.   Cardiovascular:      Rate and Rhythm: Normal rate.   Pulmonary:      Effort: Pulmonary effort is normal.   Abdominal:      General: Abdomen is flat.   Musculoskeletal:      Cervical back: Normal range of motion.   Skin:     General: Skin is warm.   Neurological:      General: No focal deficit present.      Mental Status: She is alert.   Psychiatric:         Mood and Affect: Mood normal.       LABS  -CBC reviewed    Assessment and Plan:    multiple myeloma IgG kappa high risk  Diagnosed 2017  Patient Dr. Parry  Patient is undergoing treatment with daratumumab, pomalidomide 3 mg- 3 weeks on 1 week off and 1 week off dexamethasone 4 mg once a week  -Patient is responding to treatment  Patient has been having side effects from Pomalyst/ it was changed to 3 mg 2 weeks on 1 week off  She continues to have neutropenia and dizziness weakness.  Reviewed with Dr. Parry  Per Dr. Parry will change Pomalyst to 2 mg 2 weeks on 1 week off-patient agrees with plan  Okay to proceed with daratumumab  Schedule for labs and follow-up with Dr. 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  Check temperature frequently in the event of fever chills sweats or any signs symptoms of infection go to ER      Anemia   Patient denies bleeding  Overall stable hemoglobin    Thrombocytopenia  Secondary to Pomalyst  Patient denies bleeding bruising  In the event of bleeding bruising fall or injury go to ER    Prophylaxis  Continue with aspirin and acyclovir    Bone health  Continue to hold Zometa until dental work-up is done    Generalized weakness  Chronic issue for the patient    Dizziness chronic issue for the patient  As above Pomalyst  dose has been reduced  Patient is scheduled to see neurologist    Call our clinic with any changes in health condition questions    MARIUSZ Mckeon CNP  [facility]     Chart documentation with Dragon Voice recognition Software. Although reviewed after completion, some words and grammatical errors may remain.      Again, thank you for allowing me to participate in the care of your patient.        Sincerely,        MARIUSZ Mckeon CNP

## 2023-09-05 NOTE — TELEPHONE ENCOUNTER
Oral Chemotherapy Monitoring Program   Medication: Pomalyst  Rx: 2mg PO daily on days 1 through 14 of 21 day cycle   Auth #:  [id number] obtained on 9/5/2023  Risk Category: adult female of non child bearing potential  Routine survey questions reviewed.   Rx to be Escribed to [facility]     Terrie Bowen [facility] Oncology Pharmacy Liaison  [phone]

## 2023-09-05 NOTE — PROGRESS NOTES
Telephone visit --11 minutes      Oncology/Hematology Visit Note  Sep 5, 2023    Reason for Visit: follow up of multiple myeloma IgG kappa high risk  Diagnosed 2017  Patient Dr. Parry  Patient is undergoing treatment with daratumumab pomalidomide and dexamethasone    Interval History:  Patient reports she continues to feel weak.  Denies fever chills sweats cough shortness of breath chest pain denies nausea vomiting diarrhea abdominal pain or bleeding.  Denies urinary symptoms.  Denies edema      Review of Systems:  14 point ROS of systems including Constitutional, Eyes, Respiratory, Cardiovascular, Gastroenterology, Genitourinary, Integumentary, Muscularskeletal, Psychiatric were all negative except for pertinent positives noted in my HPI.    Physical Examination:  Physical Exam  HENT:      Head: Normocephalic.      Right Ear: Tympanic membrane normal.      Nose: Nose normal.      Mouth/Throat:      Mouth: Mucous membranes are moist.   Eyes:      Pupils: Pupils are equal, round, and reactive to light.   Cardiovascular:      Rate and Rhythm: Normal rate.   Pulmonary:      Effort: Pulmonary effort is normal.   Abdominal:      General: Abdomen is flat.   Musculoskeletal:      Cervical back: Normal range of motion.   Skin:     General: Skin is warm.   Neurological:      General: No focal deficit present.      Mental Status: She is alert.   Psychiatric:         Mood and Affect: Mood normal.       LABS  -CBC reviewed    Assessment and Plan:    multiple myeloma IgG kappa high risk  Diagnosed 2017  Patient Dr. Parry  Patient is undergoing treatment with daratumumab, pomalidomide 3 mg- 3 weeks on 1 week off and 1 week off dexamethasone 4 mg once a week  -Patient is responding to treatment  Patient has been having side effects from Pomalyst/ it was changed to 3 mg 2 weeks on 1 week off  She continues to have neutropenia and dizziness weakness.  Reviewed with Dr. Parry  Per Dr. Parry will change Pomalyst to 2 mg 2 weeks on 1  week off-patient agrees with plan  Okay to proceed with daratumumab  Schedule for labs and follow-up with Dr. 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  Check temperature frequently in the event of fever chills sweats or any signs symptoms of infection go to ER      Anemia   Patient denies bleeding  Overall stable hemoglobin    Thrombocytopenia  Secondary to Pomalyst  Patient denies bleeding bruising  In the event of bleeding bruising fall or injury go to ER    Prophylaxis  Continue with aspirin and acyclovir    Bone health  Continue to hold Zometa until dental work-up is done    Generalized weakness  Chronic issue for the patient    Dizziness chronic issue for the patient  As above Pomalyst dose has been reduced  Patient is scheduled to see neurologist    Call our clinic with any changes in health condition questions    MARIUSZ Mckeon [facility]     Chart documentation with Dragon Voice recognition Software. Although reviewed after completion, some words and grammatical errors may remain.

## 2023-09-05 NOTE — PROGRESS NOTES
Infusion Nursing Note:  Quiana Dunaway presents today for C11D1 Darzalex FasPro.    Patient seen by provider today: Yes: Juaquin Hammond   present during visit today: Not Applicable.    Note: N/A.      Intravenous Access:  No Intravenous access/labs at this visit.    Treatment Conditions:  Lab Results   Component Value Date    HGB 10.7 (L) 09/05/2023    WBC 4.0 09/05/2023    ANEU 2.3 08/14/2023    ANEUTAUTO 0.7 (L) 09/05/2023     (L) 09/05/2023        Lab Results   Component Value Date     09/05/2023    POTASSIUM 4.2 09/05/2023    MAG 2.4 (H) 05/26/2020    CR 0.70 09/05/2023    HUMBERTO 8.7 (L) 09/05/2023    BILITOTAL 0.7 09/05/2023    ALBUMIN 3.8 09/05/2023    ALT 6 09/05/2023    AST 10 09/05/2023       Results reviewed, labs MET treatment parameters, ok to proceed with treatment.  Per Juaquin Hammond NP, can proceed with treatment despite ANC.      Post Infusion Assessment:  Patient tolerated injection without incident.  Site patent and intact, free from redness, edema or discomfort.  No evidence of extravasations.       Discharge Plan:   Patient declined prescription refills.  Discharge instructions reviewed with: Family.  Patient and/or family verbalized understanding of discharge instructions and all questions answered.  AVS to patient via [employer].  Patient will return when scheduled for next appointment.   Patient discharged in stable condition accompanied by: self and daughter.  Departure Mode: Ambulatory.      Hiram Chu RN

## 2023-09-05 NOTE — PROGRESS NOTES
Medical Assistant Note:  Quiana Dunaway presents today for blood draw.    Patient seen by provider today: Yes: marco a.   present during visit today: Not Applicable.    Concerns: No Concerns.    Procedure:  Lab draw site: left hand, Needle type: bf, Gauge: 23.    Post Assessment:  Labs drawn without difficulty: Yes.    Discharge Plan:  Departure Mode: Ambulatory.    Face to Face Time: 5 mn.    Mariela Lyn, CMA

## 2023-09-05 NOTE — PROGRESS NOTES
"Oncology Rooming Note    September 5, 2023 8:38 AM   Quiana Dunaway is a 88 year old female who presents for:    Chief Complaint   Patient presents with    Oncology Clinic Visit     Initial Vitals: There were no vitals taken for this visit. Estimated body mass index is 23.78 kg/m  as calculated from the following:    Height as of 8/15/23: 1.575 m (5' 2\").    Weight as of 8/15/23: 59 kg (130 lb). There is no height or weight on file to calculate BSA.  Data Unavailable Comment: Data Unavailable   No LMP recorded. Patient is postmenopausal.  Allergies reviewed: Yes  Medications reviewed: Yes    Medications: Medication refills not needed today.  Pharmacy name entered into [facility]: [facility] - [place], [place] - [address]    Clinical concerns: feeling dizzy  Juaquin Hammond was notified.      Shari J. Schoenberger, DYANA            "

## 2023-09-06 LAB
ALBUMIN SERPL ELPH-MCNC: 3.8 G/DL (ref 3.7–5.1)
ALPHA1 GLOB SERPL ELPH-MCNC: 0.3 G/DL (ref 0.2–0.4)
ALPHA2 GLOB SERPL ELPH-MCNC: 0.6 G/DL (ref 0.5–0.9)
B-GLOBULIN SERPL ELPH-MCNC: 0.5 G/DL (ref 0.6–1)
GAMMA GLOB SERPL ELPH-MCNC: 0.8 G/DL (ref 0.7–1.6)
IGG SERPL-MCNC: 823 MG/DL (ref 610–1616)
KAPPA LC FREE SER-MCNC: 38.11 MG/DL (ref 0.33–1.94)
KAPPA LC FREE/LAMBDA FREE SER NEPH: 127.03 {RATIO} (ref 0.26–1.65)
LAMBDA LC FREE SERPL-MCNC: 0.3 MG/DL (ref 0.57–2.63)
M PROTEIN SERPL ELPH-MCNC: 0.6 G/DL
PROT PATTERN SERPL ELPH-IMP: ABNORMAL

## 2023-09-13 ENCOUNTER — DOCUMENTATION ONLY (OUTPATIENT)
Dept: PHARMACY | Facility: CLINIC | Age: 88
End: 2023-09-13
Payer: MEDICARE

## 2023-09-13 NOTE — PROGRESS NOTES
"Oral Chemotherapy Monitoring Program    Subjective/Objective:  Quiana Dunaway's Daughter in law Regi was contacted by phone for a follow-up visit for oral chemotherapy. Regi related that Quiana had started the new dose of Pomalyst on 9/7 and seemed relieved that Quiana was doing overall \"better\" on this dose of the medication. Regi reported that Quiana was still feeling some fatigue and dizziness but seemed to think it was unrelated to the the Pomalyst and and that it was less severe than with the previous doses of Pomalyst. Regi reported that Quiana has been adherent to the medication regimen and was aware of the dose being 2 weeks on, 1 week off. Regi did not have any other questions or concerns at this time.         6/19/2023    12:00 PM 6/26/2023    12:00 PM 7/24/2023    11:00 AM 8/7/2023     3:00 PM 8/15/2023     9:00 AM 8/17/2023     4:00 PM 9/13/2023    10:00 AM   ORAL CHEMOTHERAPY   Assessment Type Initial Follow up Lab Monitoring Initial Follow up Lab Monitoring Lab Monitoring Lab Monitoring Initial Follow up   Diagnosis Code Multiple Myeloma Multiple Myeloma Multiple Myeloma Multiple Myeloma Multiple Myeloma Multiple Myeloma Multiple Myeloma   Providers Dr Sohan Parry   Clinic Name/Location [facility]   Drug Name Pomalyst (pomalidomide) Pomalyst (pomalidomide) Pomalyst (pomalidomide) Pomalyst (pomalidomide) Pomalyst (pomalidomide) Pomalyst (pomalidomide) Pomalyst (pomalidomide)   Dose 4 mg 4 mg 3 mg 3 mg 3 mg 3 mg 2 mg   Current Schedule Daily Daily Daily Daily Daily Daily Daily   Cycle Details 3 weeks on, 1 week off 3 weeks on, 1 week off 3 weeks on, 1 week off 3 weeks on, 1 week off 3 weeks on, 1 week off 2 weeks on, 1 week off 2 weeks on, 1 week off   Start Date of Last Cycle 6/12/2023 6/12/2023 7/17/2023 7/17/2023 7/17/2023 8/15/2023 9/7/2023   Planned next cycle start date  7/10/2023 8/8/2023 " "8/14/2023 8/15/2023 9/5/2023 9/28/2023   Doses missed in last 2 weeks   0       Adherence Assessment   Adherent    Adherent   Adverse Effects  Anemia;Thrombocytopenia Nausea;Diarrhea;Fatigue Neutropenia;Thrombocytopenia Anemia Fatigue Fatigue   Nausea   Grade 1  Grade 1     Pharmacist Intervention(nausea)   No  No     Diarrhea   Grade 1       Pharmacist Intervention(diarrhea)   No       Anemia  Grade 1        Pharmacist Intervention(anemia)  No        Fatigue   Grade 1   Grade 1 Grade 1   Pharmacist Intervention(fatigue)   No   Yes No   Intervention(s)      Dosing interval changed (chemo)    Neutropenia    Grade 1      Pharmacist Intervention(neutropenia)    No      Thrombocytopenia  Grade 1  Grade 1      Pharmacist Intervention(thrombocytopenia)  No  No      Any new drug interactions?   Yes       Pharmacist Intervention?   No       Is the dose as ordered appropriate for the patient?   Yes           Last PHQ-2 Score on record:       4/24/2023    11:42 AM 4/24/2023    11:41 AM   PHQ-2 ( 1999 Pf[employer])   Q1: Little interest or pleasure in doing things 1 0   Q2: Feeling down, depressed or hopeless 1 1   PHQ-2 Score 2 1   PHQ-2 Total Score (12-17 Years)- Positive if 3 or more points; Administer PHQ-A if positive 2 1   Q1: Little interest or pleasure in doing things Several days    Q2: Feeling down, depressed or hopeless Several days    PHQ-2 Score 2        Vitals:  BP:   BP Readings from Last 1 Encounters:   09/05/23 138/85     Wt Readings from Last 1 Encounters:   09/05/23 60.4 kg (133 lb 3.2 oz)     Estimated body surface area is 1.63 meters squared as calculated from the following:    Height as of 8/15/23: 1.575 m (5' 2\").    Weight as of 9/5/23: 60.4 kg (133 lb 3.2 oz).    Labs:  _  Result Component Current Result Ref Range   Sodium 141 (9/5/2023) 136 - 145 mmol/L     _  Result Component Current Result Ref Range   Potassium 4.2 (9/5/2023) 3.4 - 5.3 mmol/L     _  Result Component Current Result Ref Range   Calcium 8.7 " (L) (9/5/2023) 8.8 - 10.2 mg/dL     No results found for Mag within last 30 days.     No results found for Phos within last 30 days.     _  Result Component Current Result Ref Range   Albumin 3.8 (9/5/2023) 3.5 - 5.2 g/dL     _  Result Component Current Result Ref Range   Urea Nitrogen 23.2 (H) (9/5/2023) 8.0 - 23.0 mg/dL     _  Result Component Current Result Ref Range   Creatinine 0.70 (9/5/2023) 0.51 - 0.95 mg/dL     _  Result Component Current Result Ref Range   AST 10 (9/5/2023) 0 - 45 U/L     _  Result Component Current Result Ref Range   ALT 6 (9/5/2023) 0 - 50 U/L     _  Result Component Current Result Ref Range   Bilirubin Total 0.7 (9/5/2023) <=1.2 mg/dL     _  Result Component Current Result Ref Range   WBC Count 4.0 (9/5/2023) 4.0 - 11.0 10e3/uL     _  Result Component Current Result Ref Range   Hemoglobin 10.7 (L) (9/5/2023) 11.7 - 15.7 g/dL     _  Result Component Current Result Ref Range   Platelet Count 115 (L) (9/5/2023) 150 - 450 10e3/uL     _  Result Component Current Result Ref Range   Absolute Neutrophils 2.3 (8/14/2023) 1.6 - 8.3 10e3/uL     _  Result Component Current Result Ref Range   Absolute Neutrophils 0.7 (L) (9/5/2023) 1.6 - 8.3 10e3/uL          Assessment/Plan:  Patient has grade 1 dizziness and grade 1 fatigue, both of which have been chronic problems. Grade 3 neutropenia and anemia noted by Maryellen at last visit when dose was decreased. No concerning symptoms were reported. Continue Pomalyst at current dose.     Follow-Up:  10/3 - Labs and follow up with Sohan Livingston Due:  9/28    Jah Reynolds  Pharmacy Intern   [facility] Oncology   [phone]

## 2023-09-20 DIAGNOSIS — C90.00 MULTIPLE MYELOMA NOT HAVING ACHIEVED REMISSION (H): Primary | ICD-10-CM

## 2023-09-21 ENCOUNTER — TELEPHONE (OUTPATIENT)
Dept: ONCOLOGY | Facility: CLINIC | Age: 88
End: 2023-09-21
Payer: MEDICARE

## 2023-09-21 NOTE — ORAL ONC MGMT
Oral Chemotherapy Monitoring Program     Medication: Pomalyst  Rx: 2mg PO daily on days 1 through 14 of 21 day cycle   [id number]  Routine survey questions reviewed.   Rx to be Escribed to [facility]

## 2023-09-28 ENCOUNTER — OFFICE VISIT (OUTPATIENT)
Dept: FAMILY MEDICINE | Facility: CLINIC | Age: 88
End: 2023-09-28
Payer: MEDICARE

## 2023-09-28 ENCOUNTER — ANCILLARY PROCEDURE (OUTPATIENT)
Dept: GENERAL RADIOLOGY | Facility: CLINIC | Age: 88
End: 2023-09-28
Attending: INTERNAL MEDICINE
Payer: MEDICARE

## 2023-09-28 VITALS
HEIGHT: 61 IN | HEART RATE: 87 BPM | SYSTOLIC BLOOD PRESSURE: 149 MMHG | DIASTOLIC BLOOD PRESSURE: 71 MMHG | RESPIRATION RATE: 16 BRPM | TEMPERATURE: 98.8 F | WEIGHT: 135.2 LBS | OXYGEN SATURATION: 95 % | BODY MASS INDEX: 25.53 KG/M2

## 2023-09-28 DIAGNOSIS — C90.00 MULTIPLE MYELOMA NOT HAVING ACHIEVED REMISSION (H): ICD-10-CM

## 2023-09-28 DIAGNOSIS — R05.9 COUGH, UNSPECIFIED TYPE: ICD-10-CM

## 2023-09-28 DIAGNOSIS — Z00.00 ENCOUNTER FOR MEDICARE ANNUAL WELLNESS EXAM: ICD-10-CM

## 2023-09-28 DIAGNOSIS — I50.32 CHRONIC DIASTOLIC CONGESTIVE HEART FAILURE (H): ICD-10-CM

## 2023-09-28 DIAGNOSIS — E05.20 TOXIC MULTINODUL GOITER: ICD-10-CM

## 2023-09-28 DIAGNOSIS — R06.00 DYSPNEA, UNSPECIFIED TYPE: ICD-10-CM

## 2023-09-28 DIAGNOSIS — R06.00 DYSPNEA, UNSPECIFIED TYPE: Primary | ICD-10-CM

## 2023-09-28 LAB
BASOPHILS # BLD AUTO: 0.1 10E3/UL (ref 0–0.2)
BASOPHILS NFR BLD AUTO: 2 %
DACRYOCYTES BLD QL SMEAR: SLIGHT
ELLIPTOCYTES BLD QL SMEAR: ABNORMAL
EOSINOPHIL # BLD AUTO: 0.1 10E3/UL (ref 0–0.7)
EOSINOPHIL NFR BLD AUTO: 3 %
ERYTHROCYTE [DISTWIDTH] IN BLOOD BY AUTOMATED COUNT: 14 % (ref 10–15)
HCT VFR BLD AUTO: 34.1 % (ref 35–47)
HGB BLD-MCNC: 11.3 G/DL (ref 11.7–15.7)
LYMPHOCYTES # BLD AUTO: 2.3 10E3/UL (ref 0.8–5.3)
LYMPHOCYTES NFR BLD AUTO: 49 %
MCH RBC QN AUTO: 30.4 PG (ref 26.5–33)
MCHC RBC AUTO-ENTMCNC: 33.1 G/DL (ref 31.5–36.5)
MCV RBC AUTO: 92 FL (ref 78–100)
MONOCYTES # BLD AUTO: 1.2 10E3/UL (ref 0–1.3)
MONOCYTES NFR BLD AUTO: 26 %
NEUTROPHILS # BLD AUTO: 1 10E3/UL (ref 1.6–8.3)
NEUTROPHILS NFR BLD AUTO: 20 %
NT-PROBNP SERPL-MCNC: 175 PG/ML (ref 0–1800)
PLAT MORPH BLD: ABNORMAL
PLATELET # BLD AUTO: 135 10E3/UL (ref 150–450)
RBC # BLD AUTO: 3.72 10E6/UL (ref 3.8–5.2)
RBC MORPH BLD: ABNORMAL
TSH SERPL DL<=0.005 MIU/L-ACNC: 3.25 UIU/ML (ref 0.3–4.2)
WBC # BLD AUTO: 4.7 10E3/UL (ref 4–11)

## 2023-09-28 PROCEDURE — 71046 X-RAY EXAM CHEST 2 VIEWS: CPT | Mod: TC | Performed by: RADIOLOGY

## 2023-09-28 PROCEDURE — 84443 ASSAY THYROID STIM HORMONE: CPT | Performed by: INTERNAL MEDICINE

## 2023-09-28 PROCEDURE — 99397 PER PM REEVAL EST PAT 65+ YR: CPT | Performed by: INTERNAL MEDICINE

## 2023-09-28 PROCEDURE — 83880 ASSAY OF NATRIURETIC PEPTIDE: CPT | Performed by: INTERNAL MEDICINE

## 2023-09-28 PROCEDURE — 36415 COLL VENOUS BLD VENIPUNCTURE: CPT | Performed by: INTERNAL MEDICINE

## 2023-09-28 PROCEDURE — 85025 COMPLETE CBC W/AUTO DIFF WBC: CPT | Performed by: INTERNAL MEDICINE

## 2023-09-28 PROCEDURE — 99214 OFFICE O/P EST MOD 30 MIN: CPT | Mod: 25 | Performed by: INTERNAL MEDICINE

## 2023-09-28 ASSESSMENT — ACTIVITIES OF DAILY LIVING (ADL)
CURRENT_FUNCTION: SHOPPING REQUIRES ASSISTANCE
CURRENT_FUNCTION: LAUNDRY REQUIRES ASSISTANCE
CURRENT_FUNCTION: HOUSEWORK REQUIRES ASSISTANCE
CURRENT_FUNCTION: TRANSPORTATION REQUIRES ASSISTANCE
CURRENT_FUNCTION: PREPARING MEALS REQUIRES ASSISTANCE
CURRENT_FUNCTION: BATHING REQUIRES ASSISTANCE
CURRENT_FUNCTION: MONEY MANAGEMENT REQUIRES ASSISTANCE
CURRENT_FUNCTION: MEDICATION ADMINISTRATION REQUIRES ASSISTANCE

## 2023-09-28 ASSESSMENT — PAIN SCALES - GENERAL: PAINLEVEL: NO PAIN (0)

## 2023-09-28 NOTE — PATIENT INSTRUCTIONS
"Patient Education   Personalized Prevention Plan  You are due for the preventive services outlined below.  Your care team is available to assist you in scheduling these services.  If you have already completed any of these items, please share that information with your care team to update in your medical record.  Health Maintenance Due   Topic Date Due     Annual Wellness Visit  08/26/2021     COVID-19 Vaccine (6 - Moderna risk series) 12/01/2022     Flu Vaccine (1) 09/01/2023     Learning About Being Physically Active  What is physical activity?     Being physically active means doing any kind of activity that gets your body moving.  The types of physical activity that can help you get fit and stay healthy include:  Aerobic or \"cardio\" activities. These make your heart beat faster and make you breathe harder, such as brisk walking, riding a bike, or running. They strengthen your heart and lungs and build up your endurance.  Strength training activities. These make your muscles work against, or \"resist,\" something. Examples include lifting weights or doing push-ups. These activities help tone and strengthen your muscles and bones.  Stretches. These let you move your joints and muscles through their full range of motion. Stretching helps you be more flexible.  Reaching a balance between these three types of physical activity is important because each one contributes to your overall fitness.  What are the benefits of being active?  Being active is one of the best things you can do for your health. It helps you to:  Feel stronger and have more energy to do all the things you like to do.  Focus better at school or work.  Feel, think, and sleep better.  Reach and stay at a healthy weight.  Lose fat and build lean muscle.  Lower your risk for serious health problems, including diabetes, heart attack, high blood pressure, and some cancers.  Keep your heart, lungs, bones, muscles, and joints strong and healthy.  How can you " "make being active part of your life?  Start slowly. Make it your long-term goal to get at least 30 minutes of exercise on most days of the week. Walking is a good choice. You also may want to do other activities, such as running, swimming, cycling, or playing tennis or team sports.  Pick activities that you like--ones that make your heart beat faster, your muscles stronger, and your muscles and joints more flexible. If you find more than one thing you like doing, do them all. You don't have to do the same thing every day.  Get your heart pumping every day. Any activity that makes your heart beat faster and keeps it at that rate for a while counts.  Here are some great ways to get your heart beating faster:  Go for a brisk walk, run, or bike ride.  Go for a hike or swim.  Go in-line skating.  Play a game of touch football, basketball, or soccer.  Ride a bike.  Play tennis or racquetball.  Climb stairs.  Even some household chores can be aerobic--just do them at a faster pace. Vacuuming, raking or mowing the lawn, sweeping the garage, and washing and waxing the car all can help get your heart rate up.  Strengthen your muscles during the week. You don't have to lift heavy weights or grow big, bulky muscles to get stronger. Doing a few simple activities that make your muscles work against, or \"resist,\" something can help you get stronger.  For example, you can:  Do push-ups or sit-ups, which use your own body weight as resistance.  Lift weights or dumbbells or use stretch bands at home or in a gym or community center.  Stretch your muscles often. Stretching will help you as you become more active. It can help you stay flexible, loosen tight muscles, and avoid injury. It can also help improve your balance and posture and can be a great way to relax.  Be sure to stretch the muscles you'll be using when you work out. It's best to warm your muscles slightly before you stretch them. Walk or do some other light aerobic " "activity for a few minutes, and then start stretching.  When you stretch your muscles:  Do it slowly. Stretching is not about going fast or making sudden movements.  Don't push or bounce during a stretch.  Hold each stretch for at least 15 to 30 seconds, if you can. You should feel a stretch in the muscle, but not pain.  Breathe out as you do the stretch. Then breathe in as you hold the stretch. Don't hold your breath.  If you're worried about how more activity might affect your health, have a checkup before you start. Follow any special advice your doctor gives you for getting a smart start.  Where can you learn more?  Go to https://www.[employer].net/patiented  Enter W332 in the search box to learn more about \"Learning About Being Physically Active.\"  Current as of: October 10, 2022               Content Version: 13.7    1172-0017 [employer].   Care instructions adapted under license by your healthcare professional. If you have questions about a medical condition or this instruction, always ask your healthcare professional. [employer] disclaims any warranty or liability for your use of this information.      Learning About Dietary Guidelines  What are the Dietary Guidelines for Americans?     Dietary Guidelines for Americans provide tips for eating well and staying healthy. This helps reduce the risk for long-term (chronic) diseases.  These guidelines recommend that you:  Eat and drink the right amount for you. The U.[place] government's food guide is called MyPlate. It can help you make your own well-balanced eating plan.  Try to balance your eating with your activity. This helps you stay at a healthy weight.  Drink alcohol in moderation, if at all.  Limit foods high in salt, saturated fat, trans fat, and added sugar.  These guidelines are from the U.S. Department of Agriculture and the U.S. Department of Health and Human Services. They are updated every 5 years.  What is MyPlate?  MyPlate " "is the U.[place] government's food guide. It can help you make your own well-balanced eating plan. A balanced eating plan means that you eat enough, but not too much, and that your food gives you the nutrients you need to stay healthy.  MyPlate focuses on eating plenty of whole grains, fruits, and vegetables, and on limiting fat and sugar. It is available online at www.ChooseMyPlate.gov.  How can you get started?  If you're trying to eat healthier, you can slowly change your eating habits over time. You don't have to make big changes all at once. Start by adding one or two healthy foods to your meals each day.  Grains  Choose whole-grain breads and cereals and whole-wheat pasta and whole-grain crackers.  Vegetables  Eat a variety of vegetables every day. They have lots of nutrients and are part of a heart-healthy diet.  Fruits  Eat a variety of fruits every day. Fruits contain lots of nutrients. Choose fresh fruit instead of fruit juice.  Protein foods  Choose fish and lean poultry more often. Eat red meat and fried meats less often. Dried beans, tofu, and nuts are also good sources of protein.  Dairy  Choose low-fat or fat-free products from this food group. If you have problems digesting milk, try eating cheese or yogurt instead.  Fats and oils  Limit fats and oils if you're trying to cut calories. Choose healthy fats when you cook. These include canola oil and olive oil.  Where can you learn more?  Go to https://www.[employer].net/patiented  Enter D676 in the search box to learn more about \"Learning About Dietary Guidelines.\"  Current as of: March 1, 2023               Content Version: 13.7    7515-0273 [employer].   Care instructions adapted under license by your healthcare professional. If you have questions about a medical condition or this instruction, always ask your healthcare professional. [employer] disclaims any warranty or liability for your use of this " information.      Activities of Daily Living    Your Health Risk Assessment indicates you have difficulties with activities of daily living such as housework, bathing, preparing meals, taking medication, etc. Please make a follow up appointment for us to address this issue in more detail.

## 2023-09-28 NOTE — PROGRESS NOTES
SUBJECTIVE:   Quiana is a 88 year old who presents for Preventive Visit.      Are you in the first 12 months of your Medicare coverage?  No    History of Present Illness       Reason for visit:  Cough    She eats 0-1 servings of fruits and vegetables daily.She consumes 0 sweetened beverage(s) daily.She exercises with enough effort to increase her heart rate 9 or less minutes per day.  She exercises with enough effort to increase her heart rate 3 or less days per week.   She is taking medications regularly.  She is not taking prescribed medications regularly due to None.          Have you ever done Advance Care Planning? (For example, a Health Directive, POLST, or a discussion with a medical provider or your loved ones about your wishes): Yes, advance care planning is on file.       Fall risk  Fallen 2 or more times in the past year?: No  Any fall with injury in the past year?: No    Cognitive Screening Not appropriate due to patient declined    Do you have sleep apnea, excessive snoring or daytime drowsiness? : no    Reviewed and updated as needed this visit by clinical staff   Tobacco  Allergies  Meds              Reviewed and updated as needed this visit by Provider                 Social History     Tobacco Use    Smoking status: Never    Smokeless tobacco: Never   Substance Use Topics    Alcohol use: No     Alcohol/week: 0.0 standard drinks of alcohol              No data to display              Do you have a current opioid prescription? No  Do you use any other controlled substances or medications that are not prescribed by a provider? None          Current providers sharing in care for this patient include:   Patient Care Team:  César Chung MD as PCP - General (Internal Medicine)  César Chung MD as MD (Internal Medicine)  César Chung MD as Assigned PCP  Alex Parry MD as Assigned Cancer Care Provider  Marcel Marc MD as MD (Pulmonary Disease)  Alecia Briggs, FAUSTINO as Specialty  Care Coordinator (Hematology & Oncology)  Bella Chowdhury, PharmD as Pharmacist (Pharmacist)  Tara Mathew DPM as Assigned Surgical Provider  César Wolfe MD as MD (Neurology)  Juaquin Hammond APRN CNP as Nurse Practitioner (Hematology & Oncology)  Bella Wilkerson APRN CNP as Assigned Heart and Vascular Provider    The following health maintenance items are reviewed in Epic and correct as of today:  Health Maintenance   Topic Date Due    MEDICARE ANNUAL WELLNESS VISIT  08/26/2021    COVID-19 Vaccine (6 - Moderna risk series) 12/01/2022    INFLUENZA VACCINE (1) 09/01/2023    BMP  03/05/2024    HF ACTION PLAN  06/01/2024    ANNUAL REVIEW OF HM ORDERS  07/12/2024    ALT  09/05/2024    CBC  09/05/2024    HEMOGLOBIN  09/05/2024    FALL RISK ASSESSMENT  09/28/2024    ADVANCE CARE PLANNING  09/28/2028    DTAP/TDAP/TD IMMUNIZATION (3 - Td or Tdap) 06/01/2031    TSH W/FREE T4 REFLEX  Completed    PHQ-2 (once per calendar year)  Completed    Pneumococcal Vaccine: 65+ Years  Completed    URINALYSIS  Completed    ZOSTER IMMUNIZATION  Completed    IPV IMMUNIZATION  Aged Out    HPV IMMUNIZATION  Aged Out    MENINGITIS IMMUNIZATION  Aged Out    LIPID  Discontinued    MICROALBUMIN  Discontinued    MAMMO SCREENING  Discontinued     BP Readings from Last 3 Encounters:   09/28/23 (!) 149/71   09/05/23 138/85   08/15/23 122/76    Wt Readings from Last 3 Encounters:   09/28/23 61.3 kg (135 lb 3.2 oz)   09/05/23 60.4 kg (133 lb 3.2 oz)   08/15/23 59 kg (130 lb)                  Patient Active Problem List   Diagnosis    Benign essential hypertension    Multiple myeloma not having achieved remission (H)    Pacemaker    ROBER (generalized anxiety disorder)    CKD (chronic kidney disease) stage 3, GFR 30-59 ml/min (H)    Chronic diastolic congestive heart failure (H)    Overactive bladder    Neutropenia, unspecified type (H)    Osteopenia    Shortness of breath    Vitamin B12 deficiency (non anemic)    Apathetic thyrotoxicosis    Toxic  multinodul goiter    Vertigo    RSV infection    Chemotherapy-induced neutropenia (H)     Past Surgical History:   Procedure Laterality Date    ------------OTHER-------------      cataract eye surgery    BONE MARROW BIOPSY, BONE SPECIMEN, NEEDLE/TROCAR N/A 7/12/2017    Procedure: BIOPSY BONE MARROW;  BONE MARROW BIOPSY ;  Surgeon: Grace Vela MD;  Location:  GI    IMPLANT PACEMAKER  03/2017    AV block       Social History     Tobacco Use    Smoking status: Never    Smokeless tobacco: Never   Substance Use Topics    Alcohol use: No     Alcohol/week: 0.0 standard drinks of alcohol     Family History   Problem Relation Age of Onset    Unknown/Adopted Mother     Unknown/Adopted Father          Current Outpatient Medications   Medication Sig Dispense Refill    acyclovir (ZOVIRAX) 400 MG tablet Take 1 tablet (400 mg) by mouth 2 times daily Viral Prophylaxis. 180 tablet 3    aspirin 81 MG chewable tablet Take 81 mg by mouth daily       cyanocobalamin (VITAMIN B-12) 1000 MCG tablet Take 1 tablet (1,000 mcg) by mouth daily 90 tablet 3    Daratumumab (DARZALEX IV) Q weekly injection, given on Mondays at cancer infusion center      dexamethasone (DECADRON) 4 MG tablet Take 1 tablet (4 mg) by mouth once a week 16 tablet 2    dexamethasone (DECADRON) 4 MG tablet Take 1 tablet (4 mg) by mouth once a week ON SUNDAYS      famotidine (PEPCID) 40 MG tablet Take 1 tablet (40 mg) by mouth 2 times daily 180 tablet 3    furosemide (LASIX) 20 MG tablet In AM as needed for blood pressure greater than 160/100 90 tablet 3    Loperamide HCl (IMODIUM A-D PO) Take 1 tablet by mouth as needed       LORazepam (ATIVAN) 0.5 MG tablet Take 1 tablet (0.5 mg) by mouth every 6 hours as needed for anxiety 30 tablet 3    meclizine (ANTIVERT) 25 MG tablet Take 1 tablet (25 mg) by mouth 3 times daily as needed for dizziness 270 tablet 3    methimazole (TAPAZOLE) 5 MG tablet Take 0.5 tablets (2.5 mg) by mouth daily 45 tablet 3    mirabegron  (MYRBETRIQ) 50 MG 24 hr tablet Take 1 tablet (50 mg) by mouth daily 90 tablet 3    mirtazapine (REMERON) 15 MG tablet Take 1 tablet (15 mg) by mouth At Bedtime 90 tablet 3    neomycin-polymyxin-hydrocortisone (CORTISPORIN) 3.5-42890-3 otic solution Place 3 drops in ear(s) 4 times daily 10 mL 0    nitroglycerin (NITROSTAT) 0.4 MG sublingual tablet For chest pain place 1 tablet under the tongue every 5 minutes for 3 doses. If symptoms persist 5 minutes after 1st dose call 911. 25 tablet 0    ondansetron (ZOFRAN) 8 MG tablet Take 1 tablet (8 mg) by mouth every 8 hours as needed for nausea 90 tablet 0    pomalidomide (POMALYST) 2 MG capsule Take 1 capsule (2 mg) by mouth daily for 14 days Swallow whole, do NOT break, crush, chew or open capsule. Take on days 1-14 of repeated 21 day cycles. 14 capsule 0    potassium chloride ER (KLOR-CON M) 10 MEQ CR tablet Take 1 tablet (10 mEq) by mouth daily 90 tablet 3    prochlorperazine (COMPAZINE) 10 MG tablet Take 1 tablet (10 mg) by mouth every 6 hours as needed (Nausea/Vomiting) 30 tablet 6    triamcinolone (KENALOG) 0.1 % paste Apply to mouth lesion twice daily for one week. 5 g 3    triamcinolone (KENALOG) 0.1 % paste Take by mouth 2 times daily 5 g 2    pomalidomide (POMALYST) 2 MG capsule Take 1 capsule (2 mg) by mouth daily for 14 days Swallow whole, do NOT break, crush, chew or open capsule. Take on days 1-14 of repeated 21 day cycles. 14 capsule 0    pomalidomide (POMALYST) 3 MG capsule Take 1 capsule (3 mg) by mouth daily for 21 days Swallow whole, do NOT break, crush, chew or open capsule. Take on days 1-21 of repeated 28 day cycles. 21 capsule 0    pomalidomide (POMALYST) 3 MG capsule Take 1 capsule (3 mg) by mouth daily for 21 days Swallow whole, do NOT break, crush, chew or open capsule. Take on days 1-21 of repeated 28 day cycles. 21 capsule 0    pomalidomide (POMALYST) 4 MG capsule Take 1 capsule (4 mg) by mouth daily for 21 days Swallow whole, do NOT break,  "crush, chew or open capsule. Take on days 1-21 of repeated 28 day cycles. 21 capsule 0    pomalidomide (POMALYST) 4 MG capsule Take 1 capsule (4 mg) by mouth daily for 21 days Swallow whole, do NOT break, crush, chew or open capsule. Take on days 1-21 of repeated 28 day cycles. 21 capsule 0           Pertinent mammograms are reviewed under the imaging tab.    Review of Systems  Constitutional, HEENT, cardiovascular, pulmonary, gi and gu systems are negative, except as otherwise noted.    OBJECTIVE:   BP (!) 149/71 (BP Location: Left arm, Patient Position: Sitting, Cuff Size: Adult Regular)   Pulse 87   Temp 98.8  F (37.1  C) (Tympanic)   Resp 16   Ht 1.549 m (5' 1\")   Wt 61.3 kg (135 lb 3.2 oz)   SpO2 95%   BMI 25.55 kg/m   Estimated body mass index is 25.55 kg/m  as calculated from the following:    Height as of this encounter: 1.549 m (5' 1\").    Weight as of this encounter: 61.3 kg (135 lb 3.2 oz).  Physical Exam  General: This is a elderly female who does not appear toxic, she speaks in full sentences she is a little bit anxious which is not unusual for her, she appears comfortable.  HEENT: The bilateral tympanic membranes appear normal, the nasal exam is normal, the posterior pharynx appears normal, the mucous membranes are moist, the neck is supple without adenopathy.  Cardiovascular: The heart has a regular rate and rhythm with no appreciable murmur gallop or rub.  Pulmonary: The lungs are clear to auscultation bilaterally, breathing does not appear to be labored, again she speaks in full sentences, no use of accessory muscles.  Abdomen: Soft, not distended, not tender, bowel sounds present, no masses, no organomegaly.  Extremities: Well perfused and without appreciable edema or cyanosis.  Neurological: Alert and oriented to person place and time, cranial nerves II to XII appear grossly intact, unchanged gait, symmetric strength.  Mental State: Mildly anxious affect, well-groomed, normal speech.    CBC " with differential, BNP pending        ASSESSMENT / PLAN:       ICD-10-CM    1. Dyspnea, unspecified type  R06.00 XR Chest 2 Views     CBC with platelets and differential     BNP-N terminal pro      2. Cough, unspecified type  R05.9       3. Chronic diastolic congestive heart failure (H)  I50.32       4. Multiple myeloma not having achieved remission (H)  C90.00       5. Toxic multinodul goiter  E05.20 TSH with free T4 reflex      6. Encounter for Medicare annual wellness exam  Z00.00         88-year-old female with multiple myeloma getting chemotherapy presents with 2-week history of cough.  Her neighbor who happens to be a surgeon treated her with a Z-Venancio which she completed about 1 week ago.  However, she continues to have a cough with associated dyspnea and fatigue and malaise.  No current fevers.  She also has history of diastolic congestive heart failure.  However, she has not noted weight gain, orthopnea, PND, edema.  She last had labs for her myeloma at the beginning of September which showed a stable anemia.  She also has hyperthyroidism related to a toxic multinodular goiter.  There has also been some family stress as her son has been ill with MSSA bacteremia and multiple abscesses.      We decided to get a chest x-ray and CBC and BNP in clinic today.    If the work-up is negative, it may just take a little more time for the symptoms of a upper respiratory tract infection to resolve.  The Z-Venancio should cover most respiratory pathogens.  However, if there is a new infiltrate on the chest x-ray or an elevated white blood cell count consider additional antimicrobial therapy.  If the BNP is high or the chest x-ray suggest pulmonary edema, consider treatment  with diuretics, she has required diuretics for dyspnea in the past.    Discussed with patient and her daughter-in-law in detail.        COUNSELING:  Reviewed preventive health counseling, as reflected in patient instructions  Special attention given to:        Regular exercise       Healthy diet/nutrition       Immunizations  Recommended flu shot, COVID shot RSV shot           She reports that she has never smoked. She has never used smokeless tobacco.      Appropriate preventive services were discussed with this patient, including applicable screening as appropriate for fall prevention, nutrition, physical activity, Tobacco-use cessation, weight loss and cognition.  Checklist reviewing preventive services available has been given to the patient.    Reviewed patients plan of care and provided an AVS. The Basic Care Plan (routine screening as documented in Health Maintenance) for Quiana meets the Care Plan requirement. This Care Plan has been established and reviewed with the Patient and other:daughter in law  .          César Chung MD  [facility]    Identified Health Risks:      Wt Readings from Last 4 Encounters:   09/28/23 61.3 kg (135 lb 3.2 oz)   09/05/23 60.4 kg (133 lb 3.2 oz)   08/15/23 59 kg (130 lb)   08/07/23 60.3 kg (133 lb)     She is at risk for lack of exercise and has been provided with information to increase physical activity for the benefit of her well-being.  The patient was counseled and encouraged to consider modifying their diet and eating habits. She was provided with information on recommended healthy diet options.  The patient reports that she has difficulty with activities of daily living. I have asked that the patient make a follow up appointment in 6 weeks where this issue will be further evaluated and addressed.

## 2023-09-28 NOTE — RESULT ENCOUNTER NOTE
The following letter pertains to your most recent diagnostic tests:    As we discussed by phone, the chest x-ray does NOT show fluid on the lungs or pneumonia.          Sincerely,    Dr. Chung

## 2023-09-29 NOTE — RESULT ENCOUNTER NOTE
The following letter pertains to your most recent diagnostic tests:    As we discussed by phone the labs are stable and the chest x-ray does not show dangerous causes for cough or shortness of breath.          Sincerely,    Dr. Chung

## 2023-10-03 ENCOUNTER — LAB (OUTPATIENT)
Dept: INFUSION THERAPY | Facility: CLINIC | Age: 88
End: 2023-10-03
Attending: NURSE PRACTITIONER
Payer: MEDICARE

## 2023-10-03 ENCOUNTER — DOCUMENTATION ONLY (OUTPATIENT)
Dept: PHARMACY | Facility: CLINIC | Age: 88
End: 2023-10-03

## 2023-10-03 ENCOUNTER — ONCOLOGY VISIT (OUTPATIENT)
Dept: ONCOLOGY | Facility: CLINIC | Age: 88
End: 2023-10-03
Attending: NURSE PRACTITIONER
Payer: MEDICARE

## 2023-10-03 VITALS
RESPIRATION RATE: 16 BRPM | OXYGEN SATURATION: 96 % | SYSTOLIC BLOOD PRESSURE: 128 MMHG | TEMPERATURE: 98.1 F | DIASTOLIC BLOOD PRESSURE: 64 MMHG | HEART RATE: 87 BPM | WEIGHT: 134 LBS | BODY MASS INDEX: 25.32 KG/M2

## 2023-10-03 DIAGNOSIS — C90.00 MULTIPLE MYELOMA NOT HAVING ACHIEVED REMISSION (H): ICD-10-CM

## 2023-10-03 DIAGNOSIS — C90.00 MULTIPLE MYELOMA NOT HAVING ACHIEVED REMISSION (H): Primary | ICD-10-CM

## 2023-10-03 LAB
ALBUMIN SERPL BCG-MCNC: 3.8 G/DL (ref 3.5–5.2)
ALP SERPL-CCNC: 81 U/L (ref 35–104)
ALT SERPL W P-5'-P-CCNC: 7 U/L (ref 0–50)
ANION GAP SERPL CALCULATED.3IONS-SCNC: 10 MMOL/L (ref 7–15)
AST SERPL W P-5'-P-CCNC: 11 U/L (ref 0–45)
BASO+EOS+MONOS # BLD AUTO: ABNORMAL 10*3/UL
BASO+EOS+MONOS NFR BLD AUTO: ABNORMAL %
BASOPHILS # BLD AUTO: 0.1 10E3/UL (ref 0–0.2)
BASOPHILS NFR BLD AUTO: 2 %
BILIRUB SERPL-MCNC: 0.3 MG/DL
BUN SERPL-MCNC: 27.5 MG/DL (ref 8–23)
CALCIUM SERPL-MCNC: 8.8 MG/DL (ref 8.8–10.2)
CHLORIDE SERPL-SCNC: 107 MMOL/L (ref 98–107)
CREAT SERPL-MCNC: 0.72 MG/DL (ref 0.51–0.95)
DEPRECATED HCO3 PLAS-SCNC: 23 MMOL/L (ref 22–29)
EGFRCR SERPLBLD CKD-EPI 2021: 80 ML/MIN/1.73M2
EOSINOPHIL # BLD AUTO: 0.2 10E3/UL (ref 0–0.7)
EOSINOPHIL NFR BLD AUTO: 4 %
ERYTHROCYTE [DISTWIDTH] IN BLOOD BY AUTOMATED COUNT: 14.4 % (ref 10–15)
GLUCOSE SERPL-MCNC: 105 MG/DL (ref 70–99)
HCT VFR BLD AUTO: 32.7 % (ref 35–47)
HGB BLD-MCNC: 10.8 G/DL (ref 11.7–15.7)
IMM GRANULOCYTES # BLD: 0 10E3/UL
IMM GRANULOCYTES NFR BLD: 0 %
LYMPHOCYTES # BLD AUTO: 2.2 10E3/UL (ref 0.8–5.3)
LYMPHOCYTES NFR BLD AUTO: 53 %
MCH RBC QN AUTO: 30.8 PG (ref 26.5–33)
MCHC RBC AUTO-ENTMCNC: 33 G/DL (ref 31.5–36.5)
MCV RBC AUTO: 93 FL (ref 78–100)
MONOCYTES # BLD AUTO: 0.6 10E3/UL (ref 0–1.3)
MONOCYTES NFR BLD AUTO: 15 %
NEUTROPHILS # BLD AUTO: 1 10E3/UL (ref 1.6–8.3)
NEUTROPHILS NFR BLD AUTO: 26 %
NRBC # BLD AUTO: 0 10E3/UL
NRBC BLD AUTO-RTO: 0 /100
PLATELET # BLD AUTO: 192 10E3/UL (ref 150–450)
POTASSIUM SERPL-SCNC: 3.9 MMOL/L (ref 3.4–5.3)
PROT SERPL-MCNC: 6.4 G/DL (ref 6.4–8.3)
RBC # BLD AUTO: 3.51 10E6/UL (ref 3.8–5.2)
SODIUM SERPL-SCNC: 140 MMOL/L (ref 135–145)
WBC # BLD AUTO: 4.1 10E3/UL (ref 4–11)

## 2023-10-03 PROCEDURE — 96372 THER/PROPH/DIAG INJ SC/IM: CPT | Performed by: NURSE PRACTITIONER

## 2023-10-03 PROCEDURE — G0463 HOSPITAL OUTPT CLINIC VISIT: HCPCS | Performed by: NURSE PRACTITIONER

## 2023-10-03 PROCEDURE — 250N000011 HC RX IP 250 OP 636: Mod: JZ | Performed by: NURSE PRACTITIONER

## 2023-10-03 PROCEDURE — 80053 COMPREHEN METABOLIC PANEL: CPT | Performed by: INTERNAL MEDICINE

## 2023-10-03 PROCEDURE — 85025 COMPLETE CBC W/AUTO DIFF WBC: CPT | Performed by: INTERNAL MEDICINE

## 2023-10-03 PROCEDURE — 99213 OFFICE O/P EST LOW 20 MIN: CPT | Performed by: NURSE PRACTITIONER

## 2023-10-03 PROCEDURE — 36415 COLL VENOUS BLD VENIPUNCTURE: CPT

## 2023-10-03 RX ORDER — ZOLEDRONIC ACID 0.04 MG/ML
4 INJECTION, SOLUTION INTRAVENOUS ONCE
Status: CANCELLED | OUTPATIENT
Start: 2023-12-26 | End: 2023-12-26

## 2023-10-03 RX ORDER — EPINEPHRINE 1 MG/ML
0.3 INJECTION, SOLUTION INTRAMUSCULAR; SUBCUTANEOUS EVERY 5 MIN PRN
Status: CANCELLED | OUTPATIENT
Start: 2023-12-26

## 2023-10-03 RX ORDER — HEPARIN SODIUM (PORCINE) LOCK FLUSH IV SOLN 100 UNIT/ML 100 UNIT/ML
5 SOLUTION INTRAVENOUS
Status: CANCELLED | OUTPATIENT
Start: 2023-12-26

## 2023-10-03 RX ORDER — METHYLPREDNISOLONE SODIUM SUCCINATE 125 MG/2ML
125 INJECTION, POWDER, LYOPHILIZED, FOR SOLUTION INTRAMUSCULAR; INTRAVENOUS
Status: CANCELLED
Start: 2023-12-26

## 2023-10-03 RX ORDER — DIPHENHYDRAMINE HYDROCHLORIDE 50 MG/ML
50 INJECTION INTRAMUSCULAR; INTRAVENOUS
Status: CANCELLED
Start: 2023-12-26

## 2023-10-03 RX ORDER — ALBUTEROL SULFATE 90 UG/1
1-2 AEROSOL, METERED RESPIRATORY (INHALATION)
Status: CANCELLED
Start: 2023-12-26

## 2023-10-03 RX ORDER — MEPERIDINE HYDROCHLORIDE 25 MG/ML
25 INJECTION INTRAMUSCULAR; INTRAVENOUS; SUBCUTANEOUS EVERY 30 MIN PRN
Status: CANCELLED | OUTPATIENT
Start: 2023-12-26

## 2023-10-03 RX ORDER — HEPARIN SODIUM,PORCINE 10 UNIT/ML
5 VIAL (ML) INTRAVENOUS
Status: CANCELLED | OUTPATIENT
Start: 2023-12-26

## 2023-10-03 RX ORDER — ALBUTEROL SULFATE 0.83 MG/ML
2.5 SOLUTION RESPIRATORY (INHALATION)
Status: CANCELLED | OUTPATIENT
Start: 2023-12-26

## 2023-10-03 RX ADMIN — FILGRASTIM-AAFI 480 MCG: 480 INJECTION, SOLUTION SUBCUTANEOUS at 16:01

## 2023-10-03 ASSESSMENT — PAIN SCALES - GENERAL: PAINLEVEL: NO PAIN (0)

## 2023-10-03 NOTE — PROGRESS NOTES
Telephone visit --11 minutes      Oncology/Hematology Visit Note  Oct 3, 2023    Reason for Visit: follow up of multiple myeloma IgG kappa high risk  Diagnosed 2017  Patient Dr. Parry  Patient is undergoing treatment with daratumumab pomalidomide and dexamethasone    Interval History:  Patient reports she continues to feel weak.  Denies fever chills sweats cough shortness of breath chest pain denies nausea vomiting diarrhea abdominal pain or bleeding.  Denies urinary symptoms.  Denies edema      Review of Systems:  14 point ROS of systems including Constitutional, Eyes, Respiratory, Cardiovascular, Gastroenterology, Genitourinary, Integumentary, Muscularskeletal, Psychiatric were all negative except for pertinent positives noted in my HPI.    Physical Examination:  Physical Exam  HENT:      Head: Normocephalic.      Right Ear: Tympanic membrane normal.      Nose: Nose normal.      Mouth/Throat:      Mouth: Mucous membranes are moist.   Eyes:      Pupils: Pupils are equal, round, and reactive to light.   Cardiovascular:      Rate and Rhythm: Normal rate.   Pulmonary:      Effort: Pulmonary effort is normal.   Abdominal:      General: Abdomen is flat.   Musculoskeletal:      Cervical back: Normal range of motion.   Skin:     General: Skin is warm.   Neurological:      General: No focal deficit present.      Mental Status: She is alert.   Psychiatric:         Mood and Affect: Mood normal.       LABS  -CBC reviewed    Assessment and Plan:    multiple myeloma IgG kappa high risk  Diagnosed 2017  Patient Dr. Parry  Patient is undergoing treatment with daratumumab, pomalidomide 3 mg- 3 weeks on 1 week off and 1 week off dexamethasone 4 mg once a week  -Patient is responding to treatment  Patient has been having side effects from Pomalyst/ it was changed to 3 mg 2 weeks on 1 week off  She continues to have neutropenia and dizziness weakness.  Reviewed with Dr. Parry  Per Dr. Parry change Pomalyst to 2 mg 2 weeks on 1 week  off  She started new cycle today  Okay to proceed with daratumumab  Labs reviewed abnormalities discussed with patient  Schedule with Dr. 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  Neutropenic precautions .  Avoid crowded places and sick contacts Check temperature frequently in the event of fever chills sweats or any signs symptoms of infection go to ER  ANC is 1 I informed patient she is at high risk for infection/sepsis  -give Neupogen shot today and tomorrow      Anemia   Patient denies bleeding  Overall stable hemoglobin    Thrombocytopenia  Secondary to Pomalyst  Patient denies bleeding bruising  In the event of bleeding bruising fall or injury go to ER    Prophylaxis  Continue with aspirin and acyclovir    Bone health  Continue to hold Zometa until dental work-up is done    Generalized weakness  Chronic issue for the patient      Call our clinic with any changes in health condition questions    MARIUSZ Mckeon [facility]   Cancer [place] [place]     Chart documentation with Dragon Voice recognition Software. Although reviewed after completion, some words and grammatical errors may remain.

## 2023-10-03 NOTE — LETTER
"    10/3/2023         RE: Quiana Dunaway  [address] [facility] [address]        Dear Colleague,    Thank you for referring your patient, Quiana Dunaway, to the [facility] CANCER [facility]. Please see a copy of my visit note below.    Oncology Rooming Note    October 3, 2023 2:27 PM   Quiana Dunaway is a 88 year old female who presents for:    Chief Complaint   Patient presents with     Oncology Clinic Visit     Initial Vitals: /64   Pulse 87   Temp 98.1  F (36.7  C) (Oral)   Resp 16   Wt 60.8 kg (134 lb)   SpO2 96%   BMI 25.32 kg/m   Estimated body mass index is 25.32 kg/m  as calculated from the following:    Height as of 9/28/23: 1.549 m (5' 1\").    Weight as of this encounter: 60.8 kg (134 lb). Body surface area is 1.62 meters squared.  No Pain (0) Comment: Data Unavailable   No LMP recorded. Patient is postmenopausal.  Allergies reviewed: Yes  Medications reviewed: Yes    Medications: Medication refills not needed today.  Pharmacy name entered into [employer]: [facility] - [place], [place] - [address]    Clinical concerns: feeling uncomfortable today.  Cough 2 weeks.  Juaquin Hammond  was notified.      Shari J. Schoenberger, CMA                Telephone visit --11 minutes      Oncology/Hematology Visit Note  Oct 3, 2023    Reason for Visit: follow up of multiple myeloma IgG kappa high risk  Diagnosed 2017  Patient Dr. Parry  Patient is undergoing treatment with daratumumab pomalidomide and dexamethasone    Interval History:  Patient reports she continues to feel weak.  Denies fever chills sweats cough shortness of breath chest pain denies nausea vomiting diarrhea abdominal pain or bleeding.  Denies urinary symptoms.  Denies edema      Review of Systems:  14 point ROS of systems including Constitutional, Eyes, Respiratory, Cardiovascular, Gastroenterology, Genitourinary, Integumentary, Muscularskeletal, Psychiatric were all negative except for pertinent " positives noted in my HPI.    Physical Examination:  Physical Exam  HENT:      Head: Normocephalic.      Right Ear: Tympanic membrane normal.      Nose: Nose normal.      Mouth/Throat:      Mouth: Mucous membranes are moist.   Eyes:      Pupils: Pupils are equal, round, and reactive to light.   Cardiovascular:      Rate and Rhythm: Normal rate.   Pulmonary:      Effort: Pulmonary effort is normal.   Abdominal:      General: Abdomen is flat.   Musculoskeletal:      Cervical back: Normal range of motion.   Skin:     General: Skin is warm.   Neurological:      General: No focal deficit present.      Mental Status: She is alert.   Psychiatric:         Mood and Affect: Mood normal.       LABS  -CBC reviewed    Assessment and Plan:    multiple myeloma IgG kappa high risk  Diagnosed 2017  Patient Dr. Parry  Patient is undergoing treatment with daratumumab, pomalidomide 3 mg- 3 weeks on 1 week off and 1 week off dexamethasone 4 mg once a week  -Patient is responding to treatment  Patient has been having side effects from Pomalyst/ it was changed to 3 mg 2 weeks on 1 week off  She continues to have neutropenia and dizziness weakness.  Reviewed with Dr. Parry  Per Dr. Parry change Pomalyst to 2 mg 2 weeks on 1 week off  She started new cycle today  Okay to proceed with daratumumab  Labs reviewed abnormalities discussed with patient  Schedule with Dr. 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  Neutropenic precautions .  Avoid crowded places and sick contacts Check temperature frequently in the event of fever chills sweats or any signs symptoms of infection go to ER  ANC is 1 I informed patient she is at high risk for infection/sepsis  -give Neupogen shot today and tomorrow      Anemia   Patient denies bleeding  Overall stable hemoglobin    Thrombocytopenia  Secondary to Pomalyst  Patient denies bleeding bruising  In  the event of bleeding bruising fall or injury go to ER    Prophylaxis  Continue with aspirin and acyclovir    Bone health  Continue to hold Zometa until dental work-up is done    Generalized weakness  Chronic issue for the patient      Call our clinic with any changes in health condition questions    MARIUSZ Mckeon CNP  [facility]     Chart documentation with Dragon Voice recognition Software. Although reviewed after completion, some words and grammatical errors may remain.      Again, thank you for allowing me to participate in the care of your patient.        Sincerely,        MARIUSZ Mckeon CNP

## 2023-10-03 NOTE — PROGRESS NOTES
Oral Chemotherapy Monitoring Program  Lab Follow Up    Reviewed lab results from 10/3/23.        7/24/2023    11:00 AM 8/7/2023     3:00 PM 8/15/2023     9:00 AM 8/17/2023     4:00 PM 9/13/2023    10:00 AM 9/20/2023     3:00 PM 10/3/2023     3:00 PM   ORAL CHEMOTHERAPY   Assessment Type Initial Follow up Lab Monitoring Lab Monitoring Lab Monitoring Initial Follow up Refill Lab Monitoring   Diagnosis Code Multiple Myeloma Multiple Myeloma Multiple Myeloma Multiple Myeloma Multiple Myeloma Multiple Myeloma Multiple Myeloma   Providers Dr Sohan Parry   Clinic Name/Location [facility]   Drug Name Pomalyst (pomalidomide) Pomalyst (pomalidomide) Pomalyst (pomalidomide) Pomalyst (pomalidomide) Pomalyst (pomalidomide) Pomalyst (pomalidomide) Pomalyst (pomalidomide)   Dose 3 mg 3 mg 3 mg 3 mg 2 mg 2 mg 2 mg   Current Schedule Daily Daily Daily Daily Daily Daily Daily   Cycle Details 3 weeks on, 1 week off 3 weeks on, 1 week off 3 weeks on, 1 week off 2 weeks on, 1 week off 2 weeks on, 1 week off 2 weeks on, 1 week off 2 weeks on, 1 week off   Start Date of Last Cycle 7/17/2023 7/17/2023 7/17/2023 8/15/2023 9/7/2023  10/2/2023   Planned next cycle start date 8/8/2023 8/14/2023 8/15/2023 9/5/2023 9/28/2023  10/23/2023   Doses missed in last 2 weeks 0         Adherence Assessment Adherent    Adherent     Adverse Effects Nausea;Diarrhea;Fatigue Neutropenia;Thrombocytopenia Anemia Fatigue Fatigue  Fatigue;Neutropenia   Nausea Grade 1  Grade 1       Pharmacist Intervention(nausea) No  No       Diarrhea Grade 1         Pharmacist Intervention(diarrhea) No         Fatigue Grade 1   Grade 1 Grade 1  Grade 1   Pharmacist Intervention(fatigue) No   Yes No  No   Intervention(s)    Dosing interval changed (chemo)      Neutropenia  Grade 1     Grade 2   Pharmacist Intervention(neutropenia)  No     Yes   Thrombocytopenia  Grade 1         Pharmacist Intervention(thrombocytopenia)  No        Any new drug interactions? Yes         Pharmacist Intervention? No         Is the dose as ordered appropriate for the patient? Yes             Labs:  _  Result Component Current Result Ref Range   Sodium 140 (10/3/2023) 135 - 145 mmol/L     _  Result Component Current Result Ref Range   Potassium 3.9 (10/3/2023) 3.4 - 5.3 mmol/L     _  Result Component Current Result Ref Range   Calcium 8.8 (10/3/2023) 8.8 - 10.2 mg/dL     No results found for Mag within last 30 days.     No results found for Phos within last 30 days.     _  Result Component Current Result Ref Range   Albumin 3.8 (10/3/2023) 3.5 - 5.2 g/dL     _  Result Component Current Result Ref Range   Urea Nitrogen 27.5 (H) (10/3/2023) 8.0 - 23.0 mg/dL     _  Result Component Current Result Ref Range   Creatinine 0.72 (10/3/2023) 0.51 - 0.95 mg/dL     _  Result Component Current Result Ref Range   AST 11 (10/3/2023) 0 - 45 U/L     _  Result Component Current Result Ref Range   ALT 7 (10/3/2023) 0 - 50 U/L     _  Result Component Current Result Ref Range   Bilirubin Total 0.3 (10/3/2023) <=1.2 mg/dL     _  Result Component Current Result Ref Range   WBC Count 4.1 (10/3/2023) 4.0 - 11.0 10e3/uL     _  Result Component Current Result Ref Range   Hemoglobin 10.8 (L) (10/3/2023) 11.7 - 15.7 g/dL     _  Result Component Current Result Ref Range   Platelet Count 192 (10/3/2023) 150 - 450 10e3/uL     No results found for ANC within last 30 days.     _  Result Component Current Result Ref Range   Absolute Neutrophils 1.0 (L) (10/3/2023) 1.6 - 8.3 10e3/uL        Assessment & Plan:  Results are concerning for grade 2 neutropenia. Patient confirms new cycle start on 10/2/23. She reports that the lower 2mg dose x 14 days off for 7 days is going better, but starting on D2 she does have a lot of fatigue for the remainder of the week. Week 2 usually goes better. She would like to proceed as planned.    Patient due to start  next cycle on 10/23/23. BK ok with monthly labs still on her. Due to see patient at the end of the month.    Questions answered to patient's satisfaction.    Follow-Up:  End of October with Dr. Parry and labs.     Debra Ortega PharmD  October 3, 2023

## 2023-10-03 NOTE — PROGRESS NOTES
"Oncology Rooming Note    October 3, 2023 2:27 PM   Quiana Dunaway is a 88 year old female who presents for:    Chief Complaint   Patient presents with    Oncology Clinic Visit     Initial Vitals: /64   Pulse 87   Temp 98.1  F (36.7  C) (Oral)   Resp 16   Wt 60.8 kg (134 lb)   SpO2 96%   BMI 25.32 kg/m   Estimated body mass index is 25.32 kg/m  as calculated from the following:    Height as of 9/28/23: 1.549 m (5' 1\").    Weight as of this encounter: 60.8 kg (134 lb). Body surface area is 1.62 meters squared.  No Pain (0) Comment: Data Unavailable   No LMP recorded. Patient is postmenopausal.  Allergies reviewed: Yes  Medications reviewed: Yes    Medications: Medication refills not needed today.  Pharmacy name entered into [employer]: [facility] - [place], [place] - [address]    Clinical concerns: feeling uncomfortable today.  Cough 2 weeks.  Juaquin Hammond  was notified.      Shari J. Schoenberger, [facility]            "

## 2023-10-03 NOTE — PROGRESS NOTES
Medical Assistant Note:  Quiana Dunaway presents today for blood draw.    Patient seen by provider today: Yes: marco a  .   present during visit today: Not Applicable.    Concerns: No Concerns.    Procedure:  Lab draw site: left hand, Needle type: bf, Gauge: 23.    Post Assessment:  Labs drawn without difficulty: Yes.    Discharge Plan:  Departure Mode: Ambulatory.    Face to Face Time: 5 min.    Mariela Lyn, CMA

## 2023-10-04 ENCOUNTER — INFUSION THERAPY VISIT (OUTPATIENT)
Dept: INFUSION THERAPY | Facility: CLINIC | Age: 88
End: 2023-10-04
Attending: NURSE PRACTITIONER
Payer: MEDICARE

## 2023-10-04 VITALS
HEART RATE: 90 BPM | TEMPERATURE: 98 F | SYSTOLIC BLOOD PRESSURE: 118 MMHG | DIASTOLIC BLOOD PRESSURE: 71 MMHG | OXYGEN SATURATION: 97 % | RESPIRATION RATE: 18 BRPM

## 2023-10-04 DIAGNOSIS — D70.1 CHEMOTHERAPY-INDUCED NEUTROPENIA (H): ICD-10-CM

## 2023-10-04 DIAGNOSIS — C90.00 MULTIPLE MYELOMA NOT HAVING ACHIEVED REMISSION (H): Primary | ICD-10-CM

## 2023-10-04 DIAGNOSIS — T45.1X5A CHEMOTHERAPY-INDUCED NEUTROPENIA (H): ICD-10-CM

## 2023-10-04 PROCEDURE — 250N000011 HC RX IP 250 OP 636: Mod: JZ | Performed by: NURSE PRACTITIONER

## 2023-10-04 PROCEDURE — 96401 CHEMO ANTI-NEOPL SQ/IM: CPT

## 2023-10-04 PROCEDURE — 96372 THER/PROPH/DIAG INJ SC/IM: CPT | Performed by: NURSE PRACTITIONER

## 2023-10-04 RX ORDER — HEPARIN SODIUM (PORCINE) LOCK FLUSH IV SOLN 100 UNIT/ML 100 UNIT/ML
5 SOLUTION INTRAVENOUS
OUTPATIENT
Start: 2023-12-26

## 2023-10-04 RX ORDER — DEXAMETHASONE 4 MG/1
12 TABLET ORAL
Status: CANCELLED | OUTPATIENT
Start: 2023-10-04

## 2023-10-04 RX ORDER — ALBUTEROL SULFATE 0.83 MG/ML
2.5 SOLUTION RESPIRATORY (INHALATION)
Status: CANCELLED | OUTPATIENT
Start: 2023-10-04

## 2023-10-04 RX ORDER — MEPERIDINE HYDROCHLORIDE 25 MG/ML
25 INJECTION INTRAMUSCULAR; INTRAVENOUS; SUBCUTANEOUS EVERY 30 MIN PRN
OUTPATIENT
Start: 2023-12-26

## 2023-10-04 RX ORDER — METHYLPREDNISOLONE SODIUM SUCCINATE 125 MG/2ML
125 INJECTION, POWDER, LYOPHILIZED, FOR SOLUTION INTRAMUSCULAR; INTRAVENOUS
Start: 2023-12-26

## 2023-10-04 RX ORDER — EPINEPHRINE 1 MG/ML
0.3 INJECTION, SOLUTION INTRAMUSCULAR; SUBCUTANEOUS EVERY 5 MIN PRN
Status: CANCELLED | OUTPATIENT
Start: 2023-10-04

## 2023-10-04 RX ORDER — ALBUTEROL SULFATE 90 UG/1
1-2 AEROSOL, METERED RESPIRATORY (INHALATION)
Status: CANCELLED
Start: 2023-10-04

## 2023-10-04 RX ORDER — EPINEPHRINE 1 MG/ML
0.3 INJECTION, SOLUTION INTRAMUSCULAR; SUBCUTANEOUS EVERY 5 MIN PRN
OUTPATIENT
Start: 2023-12-26

## 2023-10-04 RX ORDER — ZOLEDRONIC ACID 0.04 MG/ML
4 INJECTION, SOLUTION INTRAVENOUS ONCE
OUTPATIENT
Start: 2023-12-26 | End: 2023-12-26

## 2023-10-04 RX ORDER — ALBUTEROL SULFATE 0.83 MG/ML
2.5 SOLUTION RESPIRATORY (INHALATION)
OUTPATIENT
Start: 2023-12-26

## 2023-10-04 RX ORDER — ACETAMINOPHEN 325 MG/1
650 TABLET ORAL
Status: CANCELLED | OUTPATIENT
Start: 2023-10-04

## 2023-10-04 RX ORDER — DIPHENHYDRAMINE HCL 25 MG
50 CAPSULE ORAL
Status: CANCELLED | OUTPATIENT
Start: 2023-10-04

## 2023-10-04 RX ORDER — LORAZEPAM 2 MG/ML
0.5 INJECTION INTRAMUSCULAR EVERY 4 HOURS PRN
Status: CANCELLED | OUTPATIENT
Start: 2023-10-04

## 2023-10-04 RX ORDER — DIPHENHYDRAMINE HYDROCHLORIDE 50 MG/ML
50 INJECTION INTRAMUSCULAR; INTRAVENOUS
Status: CANCELLED
Start: 2023-10-04

## 2023-10-04 RX ORDER — HEPARIN SODIUM,PORCINE 10 UNIT/ML
5 VIAL (ML) INTRAVENOUS
OUTPATIENT
Start: 2023-12-26

## 2023-10-04 RX ORDER — METHYLPREDNISOLONE SODIUM SUCCINATE 125 MG/2ML
125 INJECTION, POWDER, LYOPHILIZED, FOR SOLUTION INTRAMUSCULAR; INTRAVENOUS
Status: CANCELLED
Start: 2023-10-04

## 2023-10-04 RX ORDER — MEPERIDINE HYDROCHLORIDE 25 MG/ML
25 INJECTION INTRAMUSCULAR; INTRAVENOUS; SUBCUTANEOUS EVERY 30 MIN PRN
Status: CANCELLED | OUTPATIENT
Start: 2023-10-04

## 2023-10-04 RX ORDER — DIPHENHYDRAMINE HYDROCHLORIDE 50 MG/ML
50 INJECTION INTRAMUSCULAR; INTRAVENOUS
Start: 2023-12-26

## 2023-10-04 RX ORDER — ALBUTEROL SULFATE 90 UG/1
1-2 AEROSOL, METERED RESPIRATORY (INHALATION)
Start: 2023-12-26

## 2023-10-04 RX ORDER — HEPARIN SODIUM (PORCINE) LOCK FLUSH IV SOLN 100 UNIT/ML 100 UNIT/ML
5 SOLUTION INTRAVENOUS
Status: CANCELLED | OUTPATIENT
Start: 2023-10-04

## 2023-10-04 RX ORDER — HEPARIN SODIUM,PORCINE 10 UNIT/ML
5 VIAL (ML) INTRAVENOUS
Status: CANCELLED | OUTPATIENT
Start: 2023-10-04

## 2023-10-04 RX ADMIN — DARATUMUMAB AND HYALURONIDASE-FIHJ (HUMAN RECOMBINANT) 1800 MG: 1800; 30000 INJECTION SUBCUTANEOUS at 12:48

## 2023-10-04 RX ADMIN — FILGRASTIM-AAFI 480 MCG: 480 INJECTION, SOLUTION SUBCUTANEOUS at 12:46

## 2023-10-04 NOTE — PROGRESS NOTES
Infusion Nursing Note:  Quiana Dunaway presents today for C12D1 Darzalex FasPro/Neupogen.    Patient seen by provider today: No   present during visit today: Not Applicable.    Note: N/A.      Intravenous Access:  No Intravenous access/labs at this visit.    Treatment Conditions:  Lab Results   Component Value Date    HGB 10.8 (L) 10/03/2023    WBC 4.1 10/03/2023    ANEU 2.3 08/14/2023    ANEUTAUTO 1.0 (L) 10/03/2023     10/03/2023        Lab Results   Component Value Date     10/03/2023    POTASSIUM 3.9 10/03/2023    MAG 2.4 (H) 05/26/2020    CR 0.72 10/03/2023    HUMBERTO 8.8 10/03/2023    BILITOTAL 0.3 10/03/2023    ALBUMIN 3.8 10/03/2023    ALT 7 10/03/2023    AST 11 10/03/2023       Results reviewed, labs MET treatment parameters, ok to proceed with treatment.      Post Infusion Assessment:  Patient tolerated injection without incident.  Site patent and intact, free from redness, edema or discomfort.  No evidence of extravasations.       Discharge Plan:   Patient declined prescription refills.  Discharge instructions reviewed with: Family.  Patient and/or family verbalized understanding of discharge instructions and all questions answered.  AVS to patient via [employer].  Patient will return 10/31 for next appointment.   Patient discharged in stable condition accompanied by: self and daughter.  Departure Mode: Ambulatory.      Hiram Chu RN

## 2023-10-09 ENCOUNTER — TELEPHONE (OUTPATIENT)
Dept: NEUROLOGY | Facility: CLINIC | Age: 88
End: 2023-10-09

## 2023-10-17 ENCOUNTER — TELEPHONE (OUTPATIENT)
Dept: ONCOLOGY | Facility: CLINIC | Age: 88
End: 2023-10-17
Payer: MEDICARE

## 2023-10-17 DIAGNOSIS — C90.00 MULTIPLE MYELOMA NOT HAVING ACHIEVED REMISSION (H): Primary | ICD-10-CM

## 2023-10-17 NOTE — TELEPHONE ENCOUNTER
Oral Chemotherapy Monitoring Program    Medication: Pomalyst  Rx:  2 mg PO daily on days 1 - 14 of 21 day cycle #14    Auth #: [id number]  Risk Category: Adult female NOT of reproductive capacity    Routine survey questions reviewed.    Thank you,    Sandra Saenz  Oncology/Transplant [place] [email or web address]  Phone:[phone]  Fax:[phone]

## 2023-10-31 ENCOUNTER — INFUSION THERAPY VISIT (OUTPATIENT)
Dept: INFUSION THERAPY | Facility: CLINIC | Age: 88
End: 2023-10-31
Attending: NURSE PRACTITIONER
Payer: MEDICARE

## 2023-10-31 ENCOUNTER — ONCOLOGY VISIT (OUTPATIENT)
Dept: ONCOLOGY | Facility: CLINIC | Age: 88
End: 2023-10-31
Attending: INTERNAL MEDICINE
Payer: MEDICARE

## 2023-10-31 ENCOUNTER — LAB (OUTPATIENT)
Dept: INFUSION THERAPY | Facility: CLINIC | Age: 88
End: 2023-10-31
Attending: INTERNAL MEDICINE
Payer: MEDICARE

## 2023-10-31 VITALS
HEART RATE: 87 BPM | BODY MASS INDEX: 26.06 KG/M2 | SYSTOLIC BLOOD PRESSURE: 127 MMHG | HEIGHT: 61 IN | RESPIRATION RATE: 16 BRPM | TEMPERATURE: 97.6 F | DIASTOLIC BLOOD PRESSURE: 85 MMHG | WEIGHT: 138 LBS | OXYGEN SATURATION: 97 %

## 2023-10-31 DIAGNOSIS — C90.00 MULTIPLE MYELOMA NOT HAVING ACHIEVED REMISSION (H): Primary | ICD-10-CM

## 2023-10-31 DIAGNOSIS — C90.00 MULTIPLE MYELOMA NOT HAVING ACHIEVED REMISSION (H): ICD-10-CM

## 2023-10-31 DIAGNOSIS — Z23 HIGH PRIORITY FOR 2019-NCOV VACCINE: Primary | ICD-10-CM

## 2023-10-31 LAB
ALBUMIN SERPL BCG-MCNC: 3.9 G/DL (ref 3.5–5.2)
ALP SERPL-CCNC: 85 U/L (ref 35–104)
ALT SERPL W P-5'-P-CCNC: 7 U/L (ref 0–50)
ANION GAP SERPL CALCULATED.3IONS-SCNC: 8 MMOL/L (ref 7–15)
AST SERPL W P-5'-P-CCNC: 10 U/L (ref 0–45)
BASOPHILS # BLD AUTO: 0 10E3/UL (ref 0–0.2)
BASOPHILS NFR BLD AUTO: 1 %
BILIRUB SERPL-MCNC: 0.4 MG/DL
BUN SERPL-MCNC: 31.2 MG/DL (ref 8–23)
CALCIUM SERPL-MCNC: 8.3 MG/DL (ref 8.8–10.2)
CHLORIDE SERPL-SCNC: 110 MMOL/L (ref 98–107)
CREAT SERPL-MCNC: 0.68 MG/DL (ref 0.51–0.95)
DEPRECATED HCO3 PLAS-SCNC: 25 MMOL/L (ref 22–29)
EGFRCR SERPLBLD CKD-EPI 2021: 84 ML/MIN/1.73M2
EOSINOPHIL # BLD AUTO: 0.3 10E3/UL (ref 0–0.7)
EOSINOPHIL NFR BLD AUTO: 6 %
ERYTHROCYTE [DISTWIDTH] IN BLOOD BY AUTOMATED COUNT: 14.5 % (ref 10–15)
GLUCOSE SERPL-MCNC: 84 MG/DL (ref 70–99)
HCT VFR BLD AUTO: 33.8 % (ref 35–47)
HGB BLD-MCNC: 10.9 G/DL (ref 11.7–15.7)
IGG SERPL-MCNC: 1022 MG/DL (ref 610–1616)
IMM GRANULOCYTES # BLD: 0 10E3/UL
IMM GRANULOCYTES NFR BLD: 1 %
KAPPA LC FREE SER-MCNC: 44.88 MG/DL (ref 0.33–1.94)
KAPPA LC FREE/LAMBDA FREE SER NEPH: 121.3 {RATIO} (ref 0.26–1.65)
LAMBDA LC FREE SERPL-MCNC: 0.37 MG/DL (ref 0.57–2.63)
LYMPHOCYTES # BLD AUTO: 1.7 10E3/UL (ref 0.8–5.3)
LYMPHOCYTES NFR BLD AUTO: 39 %
MCH RBC QN AUTO: 30.4 PG (ref 26.5–33)
MCHC RBC AUTO-ENTMCNC: 32.2 G/DL (ref 31.5–36.5)
MCV RBC AUTO: 94 FL (ref 78–100)
MONOCYTES # BLD AUTO: 0.4 10E3/UL (ref 0–1.3)
MONOCYTES NFR BLD AUTO: 9 %
NEUTROPHILS # BLD AUTO: 1.9 10E3/UL (ref 1.6–8.3)
NEUTROPHILS NFR BLD AUTO: 44 %
NRBC # BLD AUTO: 0 10E3/UL
NRBC BLD AUTO-RTO: 0 /100
PLATELET # BLD AUTO: 165 10E3/UL (ref 150–450)
POTASSIUM SERPL-SCNC: 3.7 MMOL/L (ref 3.4–5.3)
PROT SERPL-MCNC: 6.4 G/DL (ref 6.4–8.3)
RBC # BLD AUTO: 3.58 10E6/UL (ref 3.8–5.2)
SODIUM SERPL-SCNC: 143 MMOL/L (ref 135–145)
TOTAL PROTEIN SERUM FOR ELP: 6.1 G/DL (ref 6.4–8.3)
WBC # BLD AUTO: 4.3 10E3/UL (ref 4–11)

## 2023-10-31 PROCEDURE — 250N000011 HC RX IP 250 OP 636: Performed by: INTERNAL MEDICINE

## 2023-10-31 PROCEDURE — 96401 CHEMO ANTI-NEOPL SQ/IM: CPT

## 2023-10-31 PROCEDURE — G0008 ADMIN INFLUENZA VIRUS VAC: HCPCS | Performed by: INTERNAL MEDICINE

## 2023-10-31 PROCEDURE — 82784 ASSAY IGA/IGD/IGG/IGM EACH: CPT | Performed by: INTERNAL MEDICINE

## 2023-10-31 PROCEDURE — 83521 IG LIGHT CHAINS FREE EACH: CPT | Performed by: INTERNAL MEDICINE

## 2023-10-31 PROCEDURE — 90480 ADMN SARSCOV2 VAC 1/ONLY CMP: CPT | Performed by: INTERNAL MEDICINE

## 2023-10-31 PROCEDURE — 91320 SARSCV2 VAC 30MCG TRS-SUC IM: CPT | Performed by: INTERNAL MEDICINE

## 2023-10-31 PROCEDURE — 99215 OFFICE O/P EST HI 40 MIN: CPT | Performed by: INTERNAL MEDICINE

## 2023-10-31 PROCEDURE — 90662 IIV NO PRSV INCREASED AG IM: CPT | Performed by: INTERNAL MEDICINE

## 2023-10-31 PROCEDURE — G0463 HOSPITAL OUTPT CLINIC VISIT: HCPCS | Mod: 25 | Performed by: INTERNAL MEDICINE

## 2023-10-31 PROCEDURE — 84155 ASSAY OF PROTEIN SERUM: CPT | Mod: 91 | Performed by: INTERNAL MEDICINE

## 2023-10-31 PROCEDURE — 85025 COMPLETE CBC W/AUTO DIFF WBC: CPT | Performed by: INTERNAL MEDICINE

## 2023-10-31 PROCEDURE — 84165 PROTEIN E-PHORESIS SERUM: CPT | Mod: TC | Performed by: PATHOLOGY

## 2023-10-31 PROCEDURE — 84165 PROTEIN E-PHORESIS SERUM: CPT | Mod: 26 | Performed by: PATHOLOGY

## 2023-10-31 PROCEDURE — 80053 COMPREHEN METABOLIC PANEL: CPT | Performed by: NURSE PRACTITIONER

## 2023-10-31 PROCEDURE — 36415 COLL VENOUS BLD VENIPUNCTURE: CPT | Performed by: INTERNAL MEDICINE

## 2023-10-31 RX ORDER — DIPHENHYDRAMINE HCL 25 MG
50 CAPSULE ORAL
Status: CANCELLED | OUTPATIENT
Start: 2023-10-31

## 2023-10-31 RX ORDER — ACETAMINOPHEN 325 MG/1
650 TABLET ORAL
Status: CANCELLED | OUTPATIENT
Start: 2023-10-31

## 2023-10-31 RX ORDER — DEXAMETHASONE 4 MG/1
12 TABLET ORAL
Status: CANCELLED | OUTPATIENT
Start: 2023-10-31

## 2023-10-31 RX ORDER — ALBUTEROL SULFATE 90 UG/1
1-2 AEROSOL, METERED RESPIRATORY (INHALATION)
Status: CANCELLED
Start: 2023-10-31

## 2023-10-31 RX ORDER — METHYLPREDNISOLONE SODIUM SUCCINATE 125 MG/2ML
125 INJECTION, POWDER, LYOPHILIZED, FOR SOLUTION INTRAMUSCULAR; INTRAVENOUS
Status: CANCELLED
Start: 2023-10-31

## 2023-10-31 RX ORDER — MEPERIDINE HYDROCHLORIDE 25 MG/ML
25 INJECTION INTRAMUSCULAR; INTRAVENOUS; SUBCUTANEOUS EVERY 30 MIN PRN
Status: CANCELLED | OUTPATIENT
Start: 2023-10-31

## 2023-10-31 RX ORDER — ALBUTEROL SULFATE 0.83 MG/ML
2.5 SOLUTION RESPIRATORY (INHALATION)
Status: CANCELLED | OUTPATIENT
Start: 2023-10-31

## 2023-10-31 RX ORDER — DIPHENHYDRAMINE HYDROCHLORIDE 50 MG/ML
50 INJECTION INTRAMUSCULAR; INTRAVENOUS
Status: CANCELLED
Start: 2023-10-31

## 2023-10-31 RX ORDER — HEPARIN SODIUM (PORCINE) LOCK FLUSH IV SOLN 100 UNIT/ML 100 UNIT/ML
5 SOLUTION INTRAVENOUS
Status: CANCELLED | OUTPATIENT
Start: 2023-10-31

## 2023-10-31 RX ORDER — HEPARIN SODIUM,PORCINE 10 UNIT/ML
5 VIAL (ML) INTRAVENOUS
Status: CANCELLED | OUTPATIENT
Start: 2023-10-31

## 2023-10-31 RX ORDER — EPINEPHRINE 1 MG/ML
0.3 INJECTION, SOLUTION INTRAMUSCULAR; SUBCUTANEOUS EVERY 5 MIN PRN
Status: CANCELLED | OUTPATIENT
Start: 2023-10-31

## 2023-10-31 RX ORDER — LORAZEPAM 2 MG/ML
0.5 INJECTION INTRAMUSCULAR EVERY 4 HOURS PRN
Status: CANCELLED | OUTPATIENT
Start: 2023-10-31

## 2023-10-31 RX ADMIN — DARATUMUMAB AND HYALURONIDASE-FIHJ (HUMAN RECOMBINANT) 1800 MG: 1800; 30000 INJECTION SUBCUTANEOUS at 09:44

## 2023-10-31 RX ADMIN — COVID-19 VACCINE, MRNA 30 MCG: 0.05 INJECTION, SUSPENSION INTRAMUSCULAR at 09:36

## 2023-10-31 RX ADMIN — INFLUENZA A VIRUS A/VICTORIA/4897/2022 IVR-238 (H1N1) ANTIGEN (FORMALDEHYDE INACTIVATED), INFLUENZA A VIRUS A/DARWIN/9/2021 SAN-010 (H3N2) ANTIGEN (FORMALDEHYDE INACTIVATED), INFLUENZA B VIRUS B/PHUKET/3073/2013 ANTIGEN (FORMALDEHYDE INACTIVATED), AND INFLUENZA B VIRUS B/MICHIGAN/01/2021 ANTIGEN (FORMALDEHYDE INACTIVATED) 0.7 ML: 60; 60; 60; 60 INJECTION, SUSPENSION INTRAMUSCULAR at 09:41

## 2023-10-31 ASSESSMENT — PAIN SCALES - GENERAL: PAINLEVEL: NO PAIN (0)

## 2023-10-31 NOTE — PROGRESS NOTES
"Oncology Rooming Note    October 31, 2023 8:40 AM   Quiana Dunaway is a 88 year old female who presents for:    Chief Complaint   Patient presents with    Oncology Clinic Visit     Initial Vitals: Resp 16   Ht 1.549 m (5' 1\")   Wt 62.6 kg (138 lb)   BMI 26.07 kg/m   Estimated body mass index is 26.07 kg/m  as calculated from the following:    Height as of this encounter: 1.549 m (5' 1\").    Weight as of this encounter: 62.6 kg (138 lb). Body surface area is 1.64 meters squared.  No Pain (0) Comment: Data Unavailable   No LMP recorded. Patient is postmenopausal.  Allergies reviewed: Yes  Medications reviewed: Yes    Medications: Medication refills not needed today.  Pharmacy name entered into [employer]: [facility] - [place], [place] - [address]            "

## 2023-10-31 NOTE — PROGRESS NOTES
"Infusion Nursing Note:  Quiana Dunaway presents today for C13D1 darzalex faspro, covid and flu vaccine.    Patient seen by provider today: Yes: Dr. Parry   present during visit today: Not Applicable.    Note: Per Dr. Parry, ok to proceed with treatment today, and give covid and flu vaccine today. Patient confirmed it has been at least two months since any covid vaccine. Patient and daughter in law verbalized understanding of correct dosing/schedule of oral dexamethasone and pomalyst as ordered, denies need for refills at this time. Corrected calcium 8.38 noted, patient states she is not taking any calcium supplements. Reviewed with Dr. Parry, orders as follows:    Patient to take calcium 500-600mg supplement once daily    Reviewed with patient and daughter in law, verbalized understanding and will start OTC supplement. Per note from Juaquin Hammond NP on 10/3/23, \"continue to hold Zometa until dental work-up is done.\"       Intravenous Access:  No Intravenous access/labs at this visit.    Treatment Conditions:  Lab Results   Component Value Date    HGB 10.9 (L) 10/31/2023    WBC 4.3 10/31/2023    ANEU 2.3 08/14/2023    ANEUTAUTO 1.9 10/31/2023     10/31/2023        Lab Results   Component Value Date     10/31/2023    POTASSIUM 3.7 10/31/2023    MAG 2.4 (H) 05/26/2020    CR 0.68 10/31/2023    HUMBERTO 8.3 (L) 10/31/2023    BILITOTAL 0.4 10/31/2023    ALBUMIN 3.9 10/31/2023    ALT 7 10/31/2023    AST 10 10/31/2023     Corrected calcium: 8.38  Results reviewed, labs MET treatment parameters, ok to proceed with treatment.      Post Infusion Assessment:  Patient tolerated injections without incident.  Site patent and intact, free from redness, edema or discomfort.       Discharge Plan:   Discharge instructions reviewed with: Patient.  Patient and/or family verbalized understanding of discharge instructions and all questions answered.  AVS to patient via [email or web address].  Patient will return 11/28/23 for next " appointment.   Patient discharged in stable condition accompanied by: daughter-in-law.  Departure Mode: Ambulatory.  Face to Face time: 5 minutes.      Amanda Howe RN

## 2023-10-31 NOTE — LETTER
"    10/31/2023         RE: Quiana Dunaway  [address] [facility] [address]        Dear Colleague,    Thank you for referring your patient, Quiana Dunaway, to the [facility]. Please see a copy of my visit note below.    Oncology Rooming Note    October 31, 2023 8:40 AM   Quiana Dunaway is a 88 year old female who presents for:    Chief Complaint   Patient presents with     Oncology Clinic Visit     Initial Vitals: Resp 16   Ht 1.549 m (5' 1\")   Wt 62.6 kg (138 lb)   BMI 26.07 kg/m   Estimated body mass index is 26.07 kg/m  as calculated from the following:    Height as of this encounter: 1.549 m (5' 1\").    Weight as of this encounter: 62.6 kg (138 lb). Body surface area is 1.64 meters squared.  No Pain (0) Comment: Data Unavailable   No LMP recorded. Patient is postmenopausal.  Allergies reviewed: Yes  Medications reviewed: Yes    Medications: Medication refills not needed today.  Pharmacy name entered into [facility]: [facility] - [place], [place] - [address]              HEMATOLOGY HISTORY: Mrs. Dunaway is a lady with multiple myeloma (IgG kappa). High risk, t(14;16).  1. On 05/01/2017, WBC of 3.4, hemoglobin of 10.2 and platelet of 154.  2. SPEP on 07/07/2017 revealed M-spike of 1.8.  3. Bone marrow biopsy on 07/12/2017 reveals kappa monotypic plasma cell population consistent with multiple myeloma.  Bone marrow is 70% cellular.  Plasma cell is 50-60%.     -There is gain of chromosome 1, 5, 9, 15, and 17.  There is translocation 14;16.   4.  On 07/18/2017:  -M-spike of 1.9.   -Immunofixation reveals monoclonal IgG kappa.    -IgG level of 2970.  IgA of 15 and IgM of 175.    -Kappa free light chain of 67.7.  Lambda free light chain of 1.42.  Ratio of kappa to lambda of 47.71.    -Beta 2 microglobulin of 3.4.   5. PET scan on 07/24/2017 reveals several focal areas of increased FDG uptake consistent with multiple myeloma. "  There is probable epithelial cyst in tail of the pancreas.  No associated abnormal FDG activity.  Left thyroid cysts or nodules without any FDG activity.  There is a 0.3 cm left upper lobe lung nodule.   6.  Velcade, Revlimid and dexamethasone between on 08/14/2017 and 04/30/2018. Velcade stopped.   -Revlimid and dexamethasone continued.  -Revlimid held between 12/27/2021 and 03/08/2022.  -Daratumumab added on 11/14/2022.  -Changed to daratumumab, pomalidomide and dexamethasone on 06/12/2023.  7. CT chest, abdomen, and pelvis on 12/30/2021 does not reveal any malignancy.  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     SUBJECTIVE:  Ms. Dunaway is an 88-year-old female with multiple myeloma on daratumumab, pomalidomide and dexamethasone.  Dose of pomalidomide reduced to 2 mg because of side effects.     Zometa has been on hold for dental work.     Overall she is tolerating treatment well.  She has generalized weakness secondary to myeloma, her age and medications.  She has arthritic joint pain mainly in the right shoulder.  No worsening of pain.    She has chronic dizziness.  No worsening. No headache.  No chest pain.  No shortness of breath.  No abdominal pain.  No nausea or vomiting.  No urinary complaint.  She gets intermittent diarrhea.  No bleeding.  No fever or night sweats.  All other review of system is negative.      PHYSICAL EXAMINATION:   Alert and oriented x 3.  Not in distress.  Vitals: Reviewed.  Rest of the systems not examined.     LABORATORY: CBC, SPEP, IgG and free light chain done on 05/15/2023 reviewed.      ASSESSMENT:    1.  An 88-year-old female with multiple myeloma on daratumumab, pomalidomide and dexamethasone.  Myeloma is overall stable.  2.  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 Anemia is stable.  3.  Chronic dizziness.  4.  Generalized weakness from myeloma, anemia, medication and old age.  5.  Arthritic joint pain especially in shoulders.      PLAN:  1.   Patient's overall condition is stable.  Labs were reviewed with her.  [place] free light chain is elevated.  In August it was 47.82.  Now is 44.8.  Her myeloma is overall stable.    2.  For myeloma, she will continue on daratumumab, pomalidomide and dexamethasone.  Pomalidomide had to be reduced to 2 mg a day because of side effects.  Her fatigue has gotten significantly worse.  Since dose reduction, she is tolerating it better.  She will continue on the same regimen.    Labs were reviewed. They are good for treatment.    3.  Zometa is on hold.  It will be resumed once all the dental work-up is complete.     4.  Patient has chronic dizziness.  Advised her to be careful and avoid fall.  She will continue on meclizine as needed.     5.  Patient has arthritic joint pain especially in the shoulder.  She will take pain medication as needed.     6.  She gets intermittent diarrhea.  She will take Imodium as needed.     7.  She had a few questions were all answered.  I will see her in 8 weeks.  In between she will see our nurse practitioner.    8.  She will get flu and COVID vaccine today.     TOTAL VISIT TIME: 40 minutes.  Time spent in today's visit, review of chart/investigations today, monitoring for toxicity of high risk medications and documentation today.      Again, thank you for allowing me to participate in the care of your patient.        Sincerely,        Alex Parry MD

## 2023-10-31 NOTE — PATIENT INSTRUCTIONS
Continue pomalidomid, daratumumab and dexamethasone.  See NP in 4 weeks.  See me in 8 weeks.  Flu and COVID vaccine.

## 2023-11-01 LAB
ALBUMIN SERPL ELPH-MCNC: 3.8 G/DL (ref 3.7–5.1)
ALPHA1 GLOB SERPL ELPH-MCNC: 0.3 G/DL (ref 0.2–0.4)
ALPHA2 GLOB SERPL ELPH-MCNC: 0.6 G/DL (ref 0.5–0.9)
B-GLOBULIN SERPL ELPH-MCNC: 0.5 G/DL (ref 0.6–1)
GAMMA GLOB SERPL ELPH-MCNC: 0.9 G/DL (ref 0.7–1.6)
M PROTEIN SERPL ELPH-MCNC: 0.7 G/DL
PROT PATTERN SERPL ELPH-IMP: ABNORMAL

## 2023-11-03 NOTE — RESULT ENCOUNTER NOTE
Dear Ms. Dunaway,    Blood tests reveal few abnormalities. We will review it during appointment.    Please, call me with any questions.    Alex Parry MD

## 2023-11-05 NOTE — PROGRESS NOTES
HEMATOLOGY HISTORY: Mrs. Dunaway is a lady with multiple myeloma (IgG kappa). High risk, t(14;16).  1. On 05/01/2017, WBC of 3.4, hemoglobin of 10.2 and platelet of 154.  2. SPEP on 07/07/2017 revealed M-spike of 1.8.  3. Bone marrow biopsy on 07/12/2017 reveals kappa monotypic plasma cell population consistent with multiple myeloma.  Bone marrow is 70% cellular.  Plasma cell is 50-60%.     -There is gain of chromosome 1, 5, 9, 15, and 17.  There is translocation 14;16.   4.  On 07/18/2017:  -M-spike of 1.9.   -Immunofixation reveals monoclonal IgG kappa.    -IgG level of 2970.  IgA of 15 and IgM of 175.    -Kappa free light chain of 67.7.  Lambda free light chain of 1.42.  Ratio of kappa to lambda of 47.71.    -Beta 2 microglobulin of 3.4.   5. PET scan on 07/24/2017 reveals several focal areas of increased FDG uptake consistent with multiple myeloma.  There is probable epithelial cyst in tail of the pancreas.  No associated abnormal FDG activity.  Left thyroid cysts or nodules without any FDG activity.  There is a 0.3 cm left upper lobe lung nodule.   6.  Velcade, Revlimid and dexamethasone between on 08/14/2017 and 04/30/2018. Velcade stopped.   -Revlimid and dexamethasone continued.  -Revlimid held between 12/27/2021 and 03/08/2022.  -Daratumumab added on 11/14/2022.  -Changed to daratumumab, pomalidomide and dexamethasone on 06/12/2023.  7. CT chest, abdomen, and pelvis on 12/30/2021 does not reveal any malignancy.  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     SUBJECTIVE:  Ms. Dunaway is an 88-year-old female with multiple myeloma on daratumumab, pomalidomide and dexamethasone.  Dose of pomalidomide reduced to 2 mg because of side effects.     Zometa has been on hold for dental work.     Overall she is tolerating treatment well.  She has generalized weakness secondary to myeloma, her age and medications.  She has arthritic joint pain mainly in the right shoulder.  No worsening  of pain.    She has chronic dizziness.  No worsening. No headache.  No chest pain.  No shortness of breath.  No abdominal pain.  No nausea or vomiting.  No urinary complaint.  She gets intermittent diarrhea.  No bleeding.  No fever or night sweats.  All other review of system is negative.      PHYSICAL EXAMINATION:   Alert and oriented x 3.  Not in distress.  Vitals: Reviewed.  Rest of the systems not examined.     LABORATORY: CBC, SPEP, IgG and free light chain done on 05/15/2023 reviewed.      ASSESSMENT:    1.  An 88-year-old female with multiple myeloma on daratumumab, pomalidomide and dexamethasone.  Myeloma is overall stable.  2.  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 Anemia is stable.  3.  Chronic dizziness.  4.  Generalized weakness from myeloma, anemia, medication and old age.  5.  Arthritic joint pain especially in shoulders.      PLAN:  1.  Patient's overall condition is stable.  Labs were reviewed with her.  [place] free light chain is elevated.  In August it was 47.82.  Now is 44.8.  Her myeloma is overall stable.    2.  For myeloma, she will continue on daratumumab, pomalidomide and dexamethasone.  Pomalidomide had to be reduced to 2 mg a day because of side effects.  Her fatigue has gotten significantly worse.  Since dose reduction, she is tolerating it better.  She will continue on the same regimen.    Labs were reviewed. They are good for treatment.    3.  Zometa is on hold.  It will be resumed once all the dental work-up is complete.     4.  Patient has chronic dizziness.  Advised her to be careful and avoid fall.  She will continue on meclizine as needed.     5.  Patient has arthritic joint pain especially in the shoulder.  She will take pain medication as needed.     6.  She gets intermittent diarrhea.  She will take Imodium as needed.     7.  She had a few questions were all answered.  I will see her in 8 weeks.  In between she will see our nurse practitioner.    8.  She will get flu and COVID  vaccine today.     TOTAL VISIT TIME: 40 minutes.  Time spent in today's visit, review of chart/investigations today, monitoring for toxicity of high risk medications and documentation today.

## 2023-11-06 ENCOUNTER — TELEPHONE (OUTPATIENT)
Dept: PHARMACY | Facility: CLINIC | Age: 88
End: 2023-11-06
Payer: MEDICARE

## 2023-11-06 DIAGNOSIS — C90.00 MULTIPLE MYELOMA NOT HAVING ACHIEVED REMISSION (H): Primary | ICD-10-CM

## 2023-11-06 NOTE — TELEPHONE ENCOUNTER
Oral Chemotherapy Monitoring Program   Medication: Pomalyst  Rx: 2mg PO daily on days 1 through 14 of 21 day cycle   Auth #:  [id number] obtained on 11/6/2023  Risk Category: adult female of non child bearing potential  Routine survey questions reviewed.   Rx to be Escribed to [facility]     Terrie Bowen [facility] Oncology Pharmacy Liaison  [phone]

## 2023-11-15 ENCOUNTER — ANCILLARY PROCEDURE (OUTPATIENT)
Dept: CARDIOLOGY | Facility: CLINIC | Age: 88
End: 2023-11-15
Attending: INTERNAL MEDICINE
Payer: MEDICARE

## 2023-11-15 DIAGNOSIS — Z95.0 CARDIAC PACEMAKER IN SITU: ICD-10-CM

## 2023-11-15 DIAGNOSIS — I49.5 SICK SINUS SYNDROME (H): ICD-10-CM

## 2023-11-15 LAB
MDC_IDC_EPISODE_DTM: NORMAL
MDC_IDC_EPISODE_DURATION: 101 S
MDC_IDC_EPISODE_DURATION: 1056 S
MDC_IDC_EPISODE_DURATION: 124 S
MDC_IDC_EPISODE_DURATION: 142 S
MDC_IDC_EPISODE_DURATION: 149 S
MDC_IDC_EPISODE_DURATION: 156 S
MDC_IDC_EPISODE_DURATION: 166 S
MDC_IDC_EPISODE_DURATION: 168 S
MDC_IDC_EPISODE_DURATION: 178 S
MDC_IDC_EPISODE_DURATION: 196 S
MDC_IDC_EPISODE_DURATION: 199 S
MDC_IDC_EPISODE_DURATION: 209 S
MDC_IDC_EPISODE_DURATION: 226 S
MDC_IDC_EPISODE_DURATION: 238 S
MDC_IDC_EPISODE_DURATION: 24 S
MDC_IDC_EPISODE_DURATION: 240 S
MDC_IDC_EPISODE_DURATION: 243 S
MDC_IDC_EPISODE_DURATION: 262 S
MDC_IDC_EPISODE_DURATION: 32 S
MDC_IDC_EPISODE_DURATION: 5 S
MDC_IDC_EPISODE_DURATION: 520 S
MDC_IDC_EPISODE_DURATION: 523 S
MDC_IDC_EPISODE_DURATION: 545 S
MDC_IDC_EPISODE_DURATION: 56 S
MDC_IDC_EPISODE_DURATION: 61 S
MDC_IDC_EPISODE_DURATION: 66 S
MDC_IDC_EPISODE_DURATION: 73 S
MDC_IDC_EPISODE_DURATION: 757 S
MDC_IDC_EPISODE_DURATION: 78 S
MDC_IDC_EPISODE_DURATION: 871 S
MDC_IDC_EPISODE_ID: 68
MDC_IDC_EPISODE_ID: 69
MDC_IDC_EPISODE_ID: 70
MDC_IDC_EPISODE_ID: 71
MDC_IDC_EPISODE_ID: 72
MDC_IDC_EPISODE_ID: 73
MDC_IDC_EPISODE_ID: 74
MDC_IDC_EPISODE_ID: 75
MDC_IDC_EPISODE_ID: 76
MDC_IDC_EPISODE_ID: 77
MDC_IDC_EPISODE_ID: 78
MDC_IDC_EPISODE_ID: 79
MDC_IDC_EPISODE_ID: 80
MDC_IDC_EPISODE_ID: 81
MDC_IDC_EPISODE_ID: 82
MDC_IDC_EPISODE_ID: 83
MDC_IDC_EPISODE_ID: 84
MDC_IDC_EPISODE_ID: 85
MDC_IDC_EPISODE_ID: 86
MDC_IDC_EPISODE_ID: 87
MDC_IDC_EPISODE_ID: 88
MDC_IDC_EPISODE_ID: 89
MDC_IDC_EPISODE_ID: 90
MDC_IDC_EPISODE_ID: 91
MDC_IDC_EPISODE_ID: 92
MDC_IDC_EPISODE_ID: 93
MDC_IDC_EPISODE_ID: 94
MDC_IDC_EPISODE_ID: 95
MDC_IDC_EPISODE_ID: 96
MDC_IDC_EPISODE_ID: 97
MDC_IDC_EPISODE_TYPE: NORMAL
MDC_IDC_LEAD_CONNECTION_STATUS: NORMAL
MDC_IDC_LEAD_CONNECTION_STATUS: NORMAL
MDC_IDC_LEAD_IMPLANT_DT: NORMAL
MDC_IDC_LEAD_IMPLANT_DT: NORMAL
MDC_IDC_LEAD_LOCATION: NORMAL
MDC_IDC_LEAD_LOCATION: NORMAL
MDC_IDC_LEAD_LOCATION_DETAIL_1: NORMAL
MDC_IDC_LEAD_LOCATION_DETAIL_1: NORMAL
MDC_IDC_LEAD_MFG: NORMAL
MDC_IDC_LEAD_MFG: NORMAL
MDC_IDC_LEAD_MODEL: NORMAL
MDC_IDC_LEAD_MODEL: NORMAL
MDC_IDC_LEAD_POLARITY_TYPE: NORMAL
MDC_IDC_LEAD_POLARITY_TYPE: NORMAL
MDC_IDC_LEAD_SERIAL: NORMAL
MDC_IDC_LEAD_SERIAL: NORMAL
MDC_IDC_MSMT_BATTERY_DTM: NORMAL
MDC_IDC_MSMT_BATTERY_REMAINING_LONGEVITY: 14 MO
MDC_IDC_MSMT_BATTERY_RRT_TRIGGER: 2.83
MDC_IDC_MSMT_BATTERY_STATUS: NORMAL
MDC_IDC_MSMT_BATTERY_VOLTAGE: 2.92 V
MDC_IDC_MSMT_LEADCHNL_RA_IMPEDANCE_VALUE: 323 OHM
MDC_IDC_MSMT_LEADCHNL_RA_IMPEDANCE_VALUE: 342 OHM
MDC_IDC_MSMT_LEADCHNL_RA_PACING_THRESHOLD_AMPLITUDE: 0.38 V
MDC_IDC_MSMT_LEADCHNL_RA_PACING_THRESHOLD_PULSEWIDTH: 0.4 MS
MDC_IDC_MSMT_LEADCHNL_RA_SENSING_INTR_AMPL: 2.12 MV
MDC_IDC_MSMT_LEADCHNL_RA_SENSING_INTR_AMPL: 2.12 MV
MDC_IDC_MSMT_LEADCHNL_RV_IMPEDANCE_VALUE: 361 OHM
MDC_IDC_MSMT_LEADCHNL_RV_IMPEDANCE_VALUE: 475 OHM
MDC_IDC_MSMT_LEADCHNL_RV_PACING_THRESHOLD_AMPLITUDE: 2.25 V
MDC_IDC_MSMT_LEADCHNL_RV_PACING_THRESHOLD_PULSEWIDTH: 0.4 MS
MDC_IDC_MSMT_LEADCHNL_RV_SENSING_INTR_AMPL: 9.88 MV
MDC_IDC_MSMT_LEADCHNL_RV_SENSING_INTR_AMPL: 9.88 MV
MDC_IDC_PG_IMPLANT_DTM: NORMAL
MDC_IDC_PG_MFG: NORMAL
MDC_IDC_PG_MODEL: NORMAL
MDC_IDC_PG_SERIAL: NORMAL
MDC_IDC_PG_TYPE: NORMAL
MDC_IDC_SESS_CLINIC_NAME: NORMAL
MDC_IDC_SESS_DTM: NORMAL
MDC_IDC_SESS_TYPE: NORMAL
MDC_IDC_SET_BRADY_AT_MODE_SWITCH_RATE: 171 {BEATS}/MIN
MDC_IDC_SET_BRADY_HYSTRATE: NORMAL
MDC_IDC_SET_BRADY_LOWRATE: 60 {BEATS}/MIN
MDC_IDC_SET_BRADY_MAX_SENSOR_RATE: 120 {BEATS}/MIN
MDC_IDC_SET_BRADY_MAX_TRACKING_RATE: 120 {BEATS}/MIN
MDC_IDC_SET_BRADY_MODE: NORMAL
MDC_IDC_SET_BRADY_PAV_DELAY_LOW: 300 MS
MDC_IDC_SET_BRADY_SAV_DELAY_LOW: 300 MS
MDC_IDC_SET_LEADCHNL_RA_PACING_AMPLITUDE: 1.5 V
MDC_IDC_SET_LEADCHNL_RA_PACING_ANODE_ELECTRODE_1: NORMAL
MDC_IDC_SET_LEADCHNL_RA_PACING_ANODE_LOCATION_1: NORMAL
MDC_IDC_SET_LEADCHNL_RA_PACING_CAPTURE_MODE: NORMAL
MDC_IDC_SET_LEADCHNL_RA_PACING_CATHODE_ELECTRODE_1: NORMAL
MDC_IDC_SET_LEADCHNL_RA_PACING_CATHODE_LOCATION_1: NORMAL
MDC_IDC_SET_LEADCHNL_RA_PACING_POLARITY: NORMAL
MDC_IDC_SET_LEADCHNL_RA_PACING_PULSEWIDTH: 0.4 MS
MDC_IDC_SET_LEADCHNL_RA_SENSING_ANODE_ELECTRODE_1: NORMAL
MDC_IDC_SET_LEADCHNL_RA_SENSING_ANODE_LOCATION_1: NORMAL
MDC_IDC_SET_LEADCHNL_RA_SENSING_CATHODE_ELECTRODE_1: NORMAL
MDC_IDC_SET_LEADCHNL_RA_SENSING_CATHODE_LOCATION_1: NORMAL
MDC_IDC_SET_LEADCHNL_RA_SENSING_POLARITY: NORMAL
MDC_IDC_SET_LEADCHNL_RA_SENSING_SENSITIVITY: 0.3 MV
MDC_IDC_SET_LEADCHNL_RV_PACING_AMPLITUDE: 4.5 V
MDC_IDC_SET_LEADCHNL_RV_PACING_ANODE_ELECTRODE_1: NORMAL
MDC_IDC_SET_LEADCHNL_RV_PACING_ANODE_LOCATION_1: NORMAL
MDC_IDC_SET_LEADCHNL_RV_PACING_CAPTURE_MODE: NORMAL
MDC_IDC_SET_LEADCHNL_RV_PACING_CATHODE_ELECTRODE_1: NORMAL
MDC_IDC_SET_LEADCHNL_RV_PACING_CATHODE_LOCATION_1: NORMAL
MDC_IDC_SET_LEADCHNL_RV_PACING_POLARITY: NORMAL
MDC_IDC_SET_LEADCHNL_RV_PACING_PULSEWIDTH: 0.4 MS
MDC_IDC_SET_LEADCHNL_RV_SENSING_ANODE_ELECTRODE_1: NORMAL
MDC_IDC_SET_LEADCHNL_RV_SENSING_ANODE_LOCATION_1: NORMAL
MDC_IDC_SET_LEADCHNL_RV_SENSING_CATHODE_ELECTRODE_1: NORMAL
MDC_IDC_SET_LEADCHNL_RV_SENSING_CATHODE_LOCATION_1: NORMAL
MDC_IDC_SET_LEADCHNL_RV_SENSING_POLARITY: NORMAL
MDC_IDC_SET_LEADCHNL_RV_SENSING_SENSITIVITY: 0.9 MV
MDC_IDC_SET_ZONE_DETECTION_INTERVAL: 350 MS
MDC_IDC_SET_ZONE_DETECTION_INTERVAL: 400 MS
MDC_IDC_SET_ZONE_STATUS: NORMAL
MDC_IDC_SET_ZONE_STATUS: NORMAL
MDC_IDC_SET_ZONE_TYPE: NORMAL
MDC_IDC_SET_ZONE_VENDOR_TYPE: NORMAL
MDC_IDC_STAT_AT_BURDEN_PERCENT: 0.1 %
MDC_IDC_STAT_AT_DTM_END: NORMAL
MDC_IDC_STAT_AT_DTM_START: NORMAL
MDC_IDC_STAT_BRADY_AP_VP_PERCENT: 87.98 %
MDC_IDC_STAT_BRADY_AP_VS_PERCENT: 0.02 %
MDC_IDC_STAT_BRADY_AS_VP_PERCENT: 11.99 %
MDC_IDC_STAT_BRADY_AS_VS_PERCENT: 0 %
MDC_IDC_STAT_BRADY_DTM_END: NORMAL
MDC_IDC_STAT_BRADY_DTM_START: NORMAL
MDC_IDC_STAT_BRADY_RA_PERCENT_PACED: 87.93 %
MDC_IDC_STAT_BRADY_RV_PERCENT_PACED: 99.97 %
MDC_IDC_STAT_EPISODE_RECENT_COUNT: 0
MDC_IDC_STAT_EPISODE_RECENT_COUNT: 30
MDC_IDC_STAT_EPISODE_RECENT_COUNT_DTM_END: NORMAL
MDC_IDC_STAT_EPISODE_RECENT_COUNT_DTM_START: NORMAL
MDC_IDC_STAT_EPISODE_TOTAL_COUNT: 0
MDC_IDC_STAT_EPISODE_TOTAL_COUNT: 0
MDC_IDC_STAT_EPISODE_TOTAL_COUNT: 14
MDC_IDC_STAT_EPISODE_TOTAL_COUNT: 80
MDC_IDC_STAT_EPISODE_TOTAL_COUNT_DTM_END: NORMAL
MDC_IDC_STAT_EPISODE_TOTAL_COUNT_DTM_START: NORMAL
MDC_IDC_STAT_EPISODE_TYPE: NORMAL
MDC_IDC_STAT_TACHYTHERAPY_RECENT_DTM_END: NORMAL
MDC_IDC_STAT_TACHYTHERAPY_RECENT_DTM_START: NORMAL
MDC_IDC_STAT_TACHYTHERAPY_TOTAL_DTM_END: NORMAL
MDC_IDC_STAT_TACHYTHERAPY_TOTAL_DTM_START: NORMAL

## 2023-11-15 PROCEDURE — 93296 REM INTERROG EVL PM/IDS: CPT | Performed by: INTERNAL MEDICINE

## 2023-11-15 PROCEDURE — 93294 REM INTERROG EVL PM/LDLS PM: CPT | Performed by: INTERNAL MEDICINE

## 2023-11-28 ENCOUNTER — ONCOLOGY VISIT (OUTPATIENT)
Dept: ONCOLOGY | Facility: CLINIC | Age: 88
End: 2023-11-28
Attending: INTERNAL MEDICINE
Payer: MEDICARE

## 2023-11-28 ENCOUNTER — TELEPHONE (OUTPATIENT)
Dept: PHARMACY | Facility: CLINIC | Age: 88
End: 2023-11-28

## 2023-11-28 ENCOUNTER — INFUSION THERAPY VISIT (OUTPATIENT)
Dept: INFUSION THERAPY | Facility: CLINIC | Age: 88
End: 2023-11-28
Attending: INTERNAL MEDICINE
Payer: MEDICARE

## 2023-11-28 VITALS
WEIGHT: 140 LBS | HEART RATE: 90 BPM | TEMPERATURE: 98 F | SYSTOLIC BLOOD PRESSURE: 173 MMHG | OXYGEN SATURATION: 96 % | HEIGHT: 61 IN | BODY MASS INDEX: 26.43 KG/M2 | RESPIRATION RATE: 16 BRPM | DIASTOLIC BLOOD PRESSURE: 85 MMHG

## 2023-11-28 VITALS — SYSTOLIC BLOOD PRESSURE: 167 MMHG | HEART RATE: 66 BPM | OXYGEN SATURATION: 97 % | DIASTOLIC BLOOD PRESSURE: 53 MMHG

## 2023-11-28 DIAGNOSIS — C90.00 MULTIPLE MYELOMA NOT HAVING ACHIEVED REMISSION (H): Primary | ICD-10-CM

## 2023-11-28 LAB
ALBUMIN SERPL BCG-MCNC: 3.9 G/DL (ref 3.5–5.2)
ALP SERPL-CCNC: 83 U/L (ref 40–150)
ALT SERPL W P-5'-P-CCNC: 7 U/L (ref 0–50)
ANION GAP SERPL CALCULATED.3IONS-SCNC: 9 MMOL/L (ref 7–15)
AST SERPL W P-5'-P-CCNC: 7 U/L (ref 0–45)
BASOPHILS # BLD AUTO: ABNORMAL 10*3/UL
BASOPHILS # BLD MANUAL: 0.1 10E3/UL (ref 0–0.2)
BASOPHILS NFR BLD AUTO: ABNORMAL %
BASOPHILS NFR BLD MANUAL: 2 %
BILIRUB SERPL-MCNC: 0.4 MG/DL
BUN SERPL-MCNC: 31 MG/DL (ref 8–23)
CALCIUM SERPL-MCNC: 8.6 MG/DL (ref 8.8–10.2)
CHLORIDE SERPL-SCNC: 110 MMOL/L (ref 98–107)
CREAT SERPL-MCNC: 0.71 MG/DL (ref 0.51–0.95)
DEPRECATED HCO3 PLAS-SCNC: 26 MMOL/L (ref 22–29)
EGFRCR SERPLBLD CKD-EPI 2021: 82 ML/MIN/1.73M2
ELLIPTOCYTES BLD QL SMEAR: SLIGHT
EOSINOPHIL # BLD AUTO: ABNORMAL 10*3/UL
EOSINOPHIL # BLD MANUAL: 0.3 10E3/UL (ref 0–0.7)
EOSINOPHIL NFR BLD AUTO: ABNORMAL %
EOSINOPHIL NFR BLD MANUAL: 5 %
ERYTHROCYTE [DISTWIDTH] IN BLOOD BY AUTOMATED COUNT: 14.1 % (ref 10–15)
GLUCOSE SERPL-MCNC: 101 MG/DL (ref 70–99)
HCT VFR BLD AUTO: 33 % (ref 35–47)
HGB BLD-MCNC: 10.7 G/DL (ref 11.7–15.7)
IGG SERPL-MCNC: 880 MG/DL (ref 610–1616)
IMM GRANULOCYTES # BLD: ABNORMAL 10*3/UL
IMM GRANULOCYTES NFR BLD: ABNORMAL %
KAPPA LC FREE SER-MCNC: 38.18 MG/DL (ref 0.33–1.94)
KAPPA LC FREE/LAMBDA FREE SER NEPH: 97.9 {RATIO} (ref 0.26–1.65)
LAMBDA LC FREE SERPL-MCNC: 0.39 MG/DL (ref 0.57–2.63)
LYMPHOCYTES # BLD AUTO: ABNORMAL 10*3/UL
LYMPHOCYTES # BLD MANUAL: 2.4 10E3/UL (ref 0.8–5.3)
LYMPHOCYTES NFR BLD AUTO: ABNORMAL %
LYMPHOCYTES NFR BLD MANUAL: 47 %
MCH RBC QN AUTO: 30.1 PG (ref 26.5–33)
MCHC RBC AUTO-ENTMCNC: 32.4 G/DL (ref 31.5–36.5)
MCV RBC AUTO: 93 FL (ref 78–100)
MONOCYTES # BLD AUTO: ABNORMAL 10*3/UL
MONOCYTES # BLD MANUAL: 0.4 10E3/UL (ref 0–1.3)
MONOCYTES NFR BLD AUTO: ABNORMAL %
MONOCYTES NFR BLD MANUAL: 7 %
NEUTROPHILS # BLD AUTO: ABNORMAL 10*3/UL
NEUTROPHILS # BLD MANUAL: 2 10E3/UL (ref 1.6–8.3)
NEUTROPHILS NFR BLD AUTO: ABNORMAL %
NEUTROPHILS NFR BLD MANUAL: 39 %
NRBC # BLD AUTO: 0 10E3/UL
NRBC BLD AUTO-RTO: 0 /100
PLAT MORPH BLD: ABNORMAL
PLATELET # BLD AUTO: 163 10E3/UL (ref 150–450)
POTASSIUM SERPL-SCNC: 3.5 MMOL/L (ref 3.4–5.3)
PROT SERPL-MCNC: 6.2 G/DL (ref 6.4–8.3)
RBC # BLD AUTO: 3.55 10E6/UL (ref 3.8–5.2)
RBC MORPH BLD: ABNORMAL
SODIUM SERPL-SCNC: 145 MMOL/L (ref 135–145)
TOTAL PROTEIN SERUM FOR ELP: 5.8 G/DL (ref 6.4–8.3)
WBC # BLD AUTO: 5 10E3/UL (ref 4–11)

## 2023-11-28 PROCEDURE — 83521 IG LIGHT CHAINS FREE EACH: CPT | Mod: 59 | Performed by: INTERNAL MEDICINE

## 2023-11-28 PROCEDURE — 250N000011 HC RX IP 250 OP 636: Mod: JZ | Performed by: NURSE PRACTITIONER

## 2023-11-28 PROCEDURE — 85007 BL SMEAR W/DIFF WBC COUNT: CPT | Performed by: INTERNAL MEDICINE

## 2023-11-28 PROCEDURE — 96401 CHEMO ANTI-NEOPL SQ/IM: CPT

## 2023-11-28 PROCEDURE — 84155 ASSAY OF PROTEIN SERUM: CPT | Performed by: INTERNAL MEDICINE

## 2023-11-28 PROCEDURE — 99213 OFFICE O/P EST LOW 20 MIN: CPT | Performed by: NURSE PRACTITIONER

## 2023-11-28 PROCEDURE — G0463 HOSPITAL OUTPT CLINIC VISIT: HCPCS | Mod: 25 | Performed by: NURSE PRACTITIONER

## 2023-11-28 PROCEDURE — 84165 PROTEIN E-PHORESIS SERUM: CPT | Mod: 26 | Performed by: PATHOLOGY

## 2023-11-28 PROCEDURE — 80053 COMPREHEN METABOLIC PANEL: CPT | Performed by: INTERNAL MEDICINE

## 2023-11-28 PROCEDURE — 85027 COMPLETE CBC AUTOMATED: CPT | Performed by: INTERNAL MEDICINE

## 2023-11-28 PROCEDURE — 36415 COLL VENOUS BLD VENIPUNCTURE: CPT

## 2023-11-28 PROCEDURE — 84165 PROTEIN E-PHORESIS SERUM: CPT | Mod: TC | Performed by: PATHOLOGY

## 2023-11-28 PROCEDURE — 82784 ASSAY IGA/IGD/IGG/IGM EACH: CPT | Performed by: INTERNAL MEDICINE

## 2023-11-28 RX ORDER — MEPERIDINE HYDROCHLORIDE 25 MG/ML
25 INJECTION INTRAMUSCULAR; INTRAVENOUS; SUBCUTANEOUS EVERY 30 MIN PRN
Status: CANCELLED | OUTPATIENT
Start: 2023-11-28

## 2023-11-28 RX ORDER — DIPHENHYDRAMINE HYDROCHLORIDE 50 MG/ML
50 INJECTION INTRAMUSCULAR; INTRAVENOUS
Status: CANCELLED
Start: 2023-11-28

## 2023-11-28 RX ORDER — ALBUTEROL SULFATE 0.83 MG/ML
2.5 SOLUTION RESPIRATORY (INHALATION)
Status: CANCELLED | OUTPATIENT
Start: 2023-11-28

## 2023-11-28 RX ORDER — HEPARIN SODIUM,PORCINE 10 UNIT/ML
5 VIAL (ML) INTRAVENOUS
Status: CANCELLED | OUTPATIENT
Start: 2023-11-28

## 2023-11-28 RX ORDER — METHYLPREDNISOLONE SODIUM SUCCINATE 125 MG/2ML
125 INJECTION, POWDER, LYOPHILIZED, FOR SOLUTION INTRAMUSCULAR; INTRAVENOUS
Status: CANCELLED
Start: 2023-11-28

## 2023-11-28 RX ORDER — DEXAMETHASONE 4 MG/1
4 TABLET ORAL WEEKLY
Qty: 16 TABLET | Refills: 2 | Status: SHIPPED | OUTPATIENT
Start: 2023-12-10 | End: 2023-12-26

## 2023-11-28 RX ORDER — EPINEPHRINE 1 MG/ML
0.3 INJECTION, SOLUTION INTRAMUSCULAR; SUBCUTANEOUS EVERY 5 MIN PRN
Status: CANCELLED | OUTPATIENT
Start: 2023-11-28

## 2023-11-28 RX ORDER — LORAZEPAM 2 MG/ML
0.5 INJECTION INTRAMUSCULAR EVERY 4 HOURS PRN
Status: CANCELLED | OUTPATIENT
Start: 2023-11-28

## 2023-11-28 RX ORDER — HEPARIN SODIUM (PORCINE) LOCK FLUSH IV SOLN 100 UNIT/ML 100 UNIT/ML
5 SOLUTION INTRAVENOUS
Status: CANCELLED | OUTPATIENT
Start: 2023-11-28

## 2023-11-28 RX ORDER — DEXAMETHASONE 4 MG/1
12 TABLET ORAL
Status: CANCELLED | OUTPATIENT
Start: 2023-11-28

## 2023-11-28 RX ORDER — ACETAMINOPHEN 325 MG/1
650 TABLET ORAL
Status: CANCELLED | OUTPATIENT
Start: 2023-11-28

## 2023-11-28 RX ORDER — ALBUTEROL SULFATE 90 UG/1
1-2 AEROSOL, METERED RESPIRATORY (INHALATION)
Status: CANCELLED
Start: 2023-11-28

## 2023-11-28 RX ORDER — DIPHENHYDRAMINE HCL 25 MG
50 CAPSULE ORAL
Status: CANCELLED | OUTPATIENT
Start: 2023-11-28

## 2023-11-28 RX ADMIN — DARATUMUMAB AND HYALURONIDASE-FIHJ (HUMAN RECOMBINANT) 1800 MG: 1800; 30000 INJECTION SUBCUTANEOUS at 10:58

## 2023-11-28 ASSESSMENT — PAIN SCALES - GENERAL: PAINLEVEL: NO PAIN (0)

## 2023-11-28 NOTE — LETTER
"    11/28/2023         RE: Quiana Dunaway  [address] [facility] [address]        Dear Colleague,    Thank you for referring your patient, Quiana Dunaway, to the [facility]. Please see a copy of my visit note below.    Oncology Rooming Note    November 28, 2023 9:37 AM   Quiana Dunaway is a 88 year old female who presents for:    Chief Complaint   Patient presents with     Oncology Clinic Visit     Initial Vitals: Resp 16   Ht 1.549 m (5' 1\")   Wt 63.5 kg (140 lb)   BMI 26.45 kg/m   Estimated body mass index is 26.45 kg/m  as calculated from the following:    Height as of this encounter: 1.549 m (5' 1\").    Weight as of this encounter: 63.5 kg (140 lb). Body surface area is 1.65 meters squared.  No Pain (0) Comment: Data Unavailable   No LMP recorded. Patient is postmenopausal.  Allergies reviewed: Yes  Medications reviewed: Yes    Medications: Medication refills not needed today.  Pharmacy name entered into [employer]: [facility] - [place], [place] - [address]                Oncology/Hematology Visit Note  Nov 28, 2023    Reason for Visit: follow up of multiple myeloma IgG kappa high risk  Diagnosed 2017  Patient Dr. Parry  Patient is undergoing treatment with daratumumab pomalidomide and dexamethasone    Interval History:  Patient reports she continues to feel weak.  Denies fever chills sweats cough shortness of breath chest pain denies nausea vomiting diarrhea abdominal pain or bleeding.  Denies urinary symptoms.  Denies edema      Review of Systems:  14 point ROS of systems including Constitutional, Eyes, Respiratory, Cardiovascular, Gastroenterology, Genitourinary, Integumentary, Muscularskeletal, Psychiatric were all negative except for pertinent positives noted in my HPI.    Physical Examination:  Physical Exam  HENT:      Head: Normocephalic.      Right Ear: Tympanic membrane normal.      Nose: Nose normal.    "   Mouth/Throat:      Mouth: Mucous membranes are moist.   Eyes:      Pupils: Pupils are equal, round, and reactive to light.   Cardiovascular:      Rate and Rhythm: Normal rate.   Pulmonary:      Effort: Pulmonary effort is normal.   Abdominal:      General: Abdomen is flat.   Musculoskeletal:      Cervical back: Normal range of motion.   Skin:     General: Skin is warm.   Neurological:      General: No focal deficit present.      Mental Status: She is alert.   Psychiatric:         Mood and Affect: Mood normal.       LABS  -CBC reviewed    Assessment and Plan:    multiple myeloma IgG kappa high risk  Diagnosed 2017  Patient Dr. Parry  Patient is undergoing treatment with daratumumab, pomalidomide 3 mg- 3 weeks on 1 week off and 1 week off dexamethasone 4 mg once a week  -Patient is responding to treatment  Due to the side effects Pomalyst changed to to 2 mg 2 weeks on 1 week off  -Dysphagia with cough she is supposed next cycle of treatment on December 3 however patient reports she is going to [place] to visit family she would like to have that week off  -Next cycle of Pomalyst will start on December 10  -Check labs before treatment today  -Myeloma labs are pending  Patient scheduled with next cycle of daratumumab with Dr. 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  Neutropenic precautions .  Avoid crowded places and sick contacts Check temperature frequently in the event of fever chills sweats or any signs symptoms of infection go to ER        Anemia   Patient denies bleeding  Overall stable hemoglobin      Prophylaxis  Continue with aspirin and acyclovir    Bone health  Continue to hold Zometa until dental work-up is done    Generalized weakness  Chronic issue for the patient    Hypertension  Recheck blood pressure today  Patient reports she has been off lisinopril for some time.  Advised patient to  check blood pressure in the morning and at night for the today and tomorrow days if systolic blood pressures above 150 please contact your PCP  Advised patient to contact her PCP for      Call our clinic with any changes in health condition questions    MARIUSZ Mckeon CNP  [facility]     Chart documentation with Dragon Voice recognition Software. Although reviewed after completion, some words and grammatical errors may remain.      Again, thank you for allowing me to participate in the care of your patient.        Sincerely,        MARIUSZ Mckeon CNP

## 2023-11-28 NOTE — TELEPHONE ENCOUNTER
Oral Chemotherapy Monitoring Program   Medication: Pomalyst  Rx: 2mg PO daily on days 1 through 14 of 21 day cycle   Auth #:  [id number] obtained on 11/28/2023  Risk Category: adult female of non child bearing potential  Routine survey questions reviewed.   Rx to be Escribed to [facility]     Terrie Bowen [facility] Oncology Pharmacy Liaison  [phone]

## 2023-11-28 NOTE — PROGRESS NOTES
Oncology/Hematology Visit Note  Nov 28, 2023    Reason for Visit: follow up of multiple myeloma IgG kappa high risk  Diagnosed 2017  Patient Dr. Parry  Patient is undergoing treatment with daratumumab pomalidomide and dexamethasone    Interval History:  Patient reports she continues to feel weak.  Denies fever chills sweats cough shortness of breath chest pain denies nausea vomiting diarrhea abdominal pain or bleeding.  Denies urinary symptoms.  Denies edema      Review of Systems:  14 point ROS of systems including Constitutional, Eyes, Respiratory, Cardiovascular, Gastroenterology, Genitourinary, Integumentary, Muscularskeletal, Psychiatric were all negative except for pertinent positives noted in my HPI.    Physical Examination:  Physical Exam  HENT:      Head: Normocephalic.      Right Ear: Tympanic membrane normal.      Nose: Nose normal.      Mouth/Throat:      Mouth: Mucous membranes are moist.   Eyes:      Pupils: Pupils are equal, round, and reactive to light.   Cardiovascular:      Rate and Rhythm: Normal rate.   Pulmonary:      Effort: Pulmonary effort is normal.   Abdominal:      General: Abdomen is flat.   Musculoskeletal:      Cervical back: Normal range of motion.   Skin:     General: Skin is warm.   Neurological:      General: No focal deficit present.      Mental Status: She is alert.   Psychiatric:         Mood and Affect: Mood normal.       LABS  -CBC reviewed    Assessment and Plan:    multiple myeloma IgG kappa high risk  Diagnosed 2017  Patient Dr. Parry  Patient is undergoing treatment with daratumumab, pomalidomide 3 mg- 3 weeks on 1 week off and 1 week off dexamethasone 4 mg once a week  -Patient is responding to treatment  Due to the side effects Pomalyst changed to to 2 mg 2 weeks on 1 week off  -Dysphagia with cough she is supposed next cycle of treatment on December 3 however patient reports she is going to [place] to visit family she would like to have that week off  -Next cycle  of Pomalyst will start on December 10  -Check labs before treatment today  -Myeloma labs are pending  Patient scheduled with next cycle of daratumumab with Dr. 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  Neutropenic precautions .  Avoid crowded places and sick contacts Check temperature frequently in the event of fever chills sweats or any signs symptoms of infection go to ER        Anemia   Patient denies bleeding  Overall stable hemoglobin      Prophylaxis  Continue with aspirin and acyclovir    Bone health  Continue to hold Zometa until dental work-up is done    Generalized weakness  Chronic issue for the patient    Hypertension  Recheck blood pressure today  Patient reports she has been off lisinopril for some time.  Advised patient to check blood pressure in the morning and at night for the today and tomorrow days if systolic blood pressures above 150 please contact your PCP  Advised patient to contact her PCP for      Call our clinic with any changes in health condition questions    MARIUSZ Mckeon [facility]     Chart documentation with Dragon Voice recognition Software. Although reviewed after completion, some words and grammatical errors may remain.

## 2023-11-28 NOTE — PROGRESS NOTES
Infusion Nursing Note:  Quiana Dunaway presents today for C14D1 Darzalex faspro.    Patient seen by provider today: Yes: Juaquin Hammond Np   present during visit today: Not Applicable.    Note: Pt hypertensive (see flowsheet)- been off antihypertensives for a couple of years. Pt to see her PCP on 11/30/23. Advised pt to monitor her BP at home.        Intravenous Access:  No Intravenous access/labs at this visit.    Treatment Conditions:  Lab Results   Component Value Date    HGB 10.7 (L) 11/28/2023    WBC 5.0 11/28/2023    ANEU 2.0 11/28/2023    ANEUTAUTO 1.9 10/31/2023     11/28/2023        Lab Results   Component Value Date     11/28/2023    POTASSIUM 3.5 11/28/2023    MAG 2.4 (H) 05/26/2020    CR 0.71 11/28/2023    HUMBERTO 8.6 (L) 11/28/2023    BILITOTAL 0.4 11/28/2023    ALBUMIN 3.9 11/28/2023    ALT 7 11/28/2023    AST 7 11/28/2023       Results reviewed, labs MET treatment parameters, ok to proceed with treatment.      Post Infusion Assessment:  Patient tolerated injection without incident.  Site patent and intact, free from redness, edema or discomfort.       Discharge Plan:   Discharge instructions reviewed with: Patient and Family.  Patient and/or family verbalized understanding of discharge instructions and all questions answered.  AVS to patient via [employer].  Patient will return 12/26/23 for next appointment.   Patient discharged in stable condition accompanied by: self and daughter.  Departure Mode: Ambulatory with walker.      Twyla Duncan RN

## 2023-11-28 NOTE — PROGRESS NOTES
"Oncology Rooming Note    November 28, 2023 9:37 AM   Quiana Dunaway is a 88 year old female who presents for:    Chief Complaint   Patient presents with    Oncology Clinic Visit     Initial Vitals: Resp 16   Ht 1.549 m (5' 1\")   Wt 63.5 kg (140 lb)   BMI 26.45 kg/m   Estimated body mass index is 26.45 kg/m  as calculated from the following:    Height as of this encounter: 1.549 m (5' 1\").    Weight as of this encounter: 63.5 kg (140 lb). Body surface area is 1.65 meters squared.  No Pain (0) Comment: Data Unavailable   No LMP recorded. Patient is postmenopausal.  Allergies reviewed: Yes  Medications reviewed: Yes    Medications: Medication refills not needed today.  Pharmacy name entered into [employer]: [facility] - [place], [place] - [address]            "

## 2023-11-29 LAB
ALBUMIN SERPL ELPH-MCNC: 3.6 G/DL (ref 3.7–5.1)
ALPHA1 GLOB SERPL ELPH-MCNC: 0.3 G/DL (ref 0.2–0.4)
ALPHA2 GLOB SERPL ELPH-MCNC: 0.6 G/DL (ref 0.5–0.9)
B-GLOBULIN SERPL ELPH-MCNC: 0.5 G/DL (ref 0.6–1)
GAMMA GLOB SERPL ELPH-MCNC: 0.8 G/DL (ref 0.7–1.6)
M PROTEIN SERPL ELPH-MCNC: 0.6 G/DL
PROT PATTERN SERPL ELPH-IMP: ABNORMAL

## 2023-11-30 ENCOUNTER — OFFICE VISIT (OUTPATIENT)
Dept: FAMILY MEDICINE | Facility: CLINIC | Age: 88
End: 2023-11-30
Payer: MEDICARE

## 2023-11-30 VITALS
HEIGHT: 61 IN | WEIGHT: 130 LBS | HEART RATE: 87 BPM | SYSTOLIC BLOOD PRESSURE: 155 MMHG | TEMPERATURE: 98 F | OXYGEN SATURATION: 95 % | BODY MASS INDEX: 24.55 KG/M2 | RESPIRATION RATE: 16 BRPM | DIASTOLIC BLOOD PRESSURE: 86 MMHG

## 2023-11-30 DIAGNOSIS — I50.32 CHRONIC DIASTOLIC CONGESTIVE HEART FAILURE (H): ICD-10-CM

## 2023-11-30 DIAGNOSIS — E05.90: ICD-10-CM

## 2023-11-30 DIAGNOSIS — I10 BENIGN ESSENTIAL HYPERTENSION: Primary | ICD-10-CM

## 2023-11-30 DIAGNOSIS — C90.00 MULTIPLE MYELOMA NOT HAVING ACHIEVED REMISSION (H): ICD-10-CM

## 2023-11-30 PROCEDURE — 99214 OFFICE O/P EST MOD 30 MIN: CPT | Performed by: INTERNAL MEDICINE

## 2023-11-30 RX ORDER — FUROSEMIDE 20 MG
TABLET ORAL
Qty: 90 TABLET | Refills: 3 | Status: SHIPPED | OUTPATIENT
Start: 2023-11-30 | End: 2024-05-01

## 2023-11-30 RX ORDER — LISINOPRIL 10 MG/1
10 TABLET ORAL DAILY
Qty: 90 TABLET | Refills: 3 | Status: SHIPPED | OUTPATIENT
Start: 2023-11-30 | End: 2023-12-26

## 2023-11-30 ASSESSMENT — PAIN SCALES - GENERAL: PAINLEVEL: NO PAIN (0)

## 2023-11-30 NOTE — PROGRESS NOTES
Assessment & Plan     Benign essential hypertension  Restart lisinopril, schedule short-term follow-up.  - lisinopril (ZESTRIL) 10 MG tablet; Take 1 tablet (10 mg) by mouth daily    Chronic diastolic congestive heart failure (H)  In the past, she has used as needed Lasix for high systolic blood pressure readings in addition to swelling in her lower extremities, I reminded him her and her daughter-in-law that this is an option if she has markedly elevated blood pressure readings at home.  - furosemide (LASIX) 20 MG tablet; Take once daily as needed for swelling or blood pressure greater than 170/100    Apathetic thyrotoxicosis  Given her general not feeling well, I did add a TSH to her labs drawn at the oncology clinic earlier this week.  - TSH; Future    Multiple myeloma not having achieved remission (H)  Continue follow-up with oncology as directed      I also advised her against excessive news watching as it tends to not be productive for management of stress and anxiety.  She can stay up-to-date with the news but should should set limits to how long she spends watching the news.      No LOS data to display   Time spent by me doing chart review, history and exam, documentation and further activities per the note       FUTURE APPOINTMENTS:       - Follow-up visit in ? Me Follow-Up for what? Chronic Disease f/u Chronic Disease f/u: Hypertension How? In Person Future, Expected: 12/30/2023 ; Patient instructed to return to clinic or contact us sooner if symptoms worsen or new symptoms develop.     César Chung MD  [facility] MAGALY Araya is a 88 year old, presenting for the following health issues:  Follow Up (2 month), Hypertension (Since Tuesday 11/28/2023), Recheck Medication (Gave lisinopril and it helped, not current), Shortness of Breath, Fatigue, and Anxiety (About having to go to hospital)        11/30/2023     2:23 PM   Additional Questions   Roomed by Omar Bland  "by Daughter in law       History of Present Illness       Reason for visit:  Nor feeling well  Symptom onset:  1-3 days ago    She eats 0-1 servings of fruits and vegetables daily.She exercises with enough effort to increase her heart rate 9 or less minutes per day.  She exercises with enough effort to increase her heart rate 3 or less days per week.   She is taking medications regularly.       Her blood pressure has been elevated recently at oncology visits.  Her labs recently were fairly stable.  She has had anxiety related to complex in the [place] recently.  Her daughter states that she watches the news nearly all day.    Wt Readings from Last 4 Encounters:   11/30/23 59 kg (130 lb)   11/28/23 63.5 kg (140 lb)   10/31/23 62.6 kg (138 lb)   10/03/23 60.8 kg (134 lb)       Review of Systems         Objective    BP (!) 155/86 (BP Location: Left arm, Patient Position: Sitting, Cuff Size: Adult Regular)   Pulse 87   Temp 98  F (36.7  C) (Oral)   Resp 16   Ht 1.549 m (5' 1\")   Wt 59 kg (130 lb)   SpO2 95%   BMI 24.56 kg/m    Body mass index is 24.56 kg/m .  Physical Exam   GENERAL: alert, no distress, frail, and elderly  RESP: lungs clear to auscultation - no rales, rhonchi or wheezes  CV: Heart with regular rate and rhythm.   MS: no gross musculoskeletal defects noted, no edema  NEURO: Normal strength and tone, mentation intact and speech normal  PSYCH: mentation appears normal, anxious, and appearance well groomed    TSH pending                   "

## 2023-11-30 NOTE — RESULT ENCOUNTER NOTE
Dear Ms. Dunaway,    Blood tests are stable.    Please, call me with any questions.    Alex Parry MD

## 2023-12-01 ENCOUNTER — TELEPHONE (OUTPATIENT)
Dept: FAMILY MEDICINE | Facility: CLINIC | Age: 88
End: 2023-12-01
Payer: MEDICARE

## 2023-12-01 NOTE — TELEPHONE ENCOUNTER
Outpatient Medication Detail     Disp Refills Start End BOO   furosemide (LASIX) 20 MG tablet 90 tablet 3 11/30/2023  No   Sig: Take once daily as needed for swelling or blood pressure greater than 170/100   Sent to pharmacy as: Furosemide 20 MG Oral Tablet (LASIX)   Class: E-Prescribe   Order: [id number]   E-Prescribing Status: Receipt confirmed by pharmacy (11/30/2023  2:58 PM CST)     Pharmacy    [id number] - [place], [place] - [address] [place] [address] [address] [facility]     Associated Diagnoses    Chronic diastolic congestive heart failure (H) [I50.32]          Pharmacy faxing- Questioning if furosemide is supposed to be ONE DAILY?  Quantity/refill send appears to match for one daily.  Or is medication PRN use?    Please clarify.    Call to pharmacy needs to be done.    RT Jose Alejandro (R)

## 2023-12-01 NOTE — TELEPHONE ENCOUNTER
In my experience, generally speaking, the words 'as needed' within a prescription SIG are interchangeable with the abbreviation 'PRN'.  As such, I am uncertain why there would be any ambiguity with respect to the SIG written for this prescription which reads 'Take once daily as needed for swelling or blood pressure greater than 170/100'.  Hopefully, that provides clarification for the dispensing pharmacy.  The patient and her daughter in law were counseled to take the medication only as needed during our office visit yesterday.

## 2023-12-21 DIAGNOSIS — R11.0 NAUSEA: ICD-10-CM

## 2023-12-21 RX ORDER — ONDANSETRON 8 MG/1
8 TABLET, FILM COATED ORAL EVERY 8 HOURS PRN
Qty: 90 TABLET | Refills: 0 | Status: SHIPPED | OUTPATIENT
Start: 2023-12-21 | End: 2023-12-26

## 2023-12-26 ENCOUNTER — ONCOLOGY VISIT (OUTPATIENT)
Dept: ONCOLOGY | Facility: CLINIC | Age: 88
End: 2023-12-26
Attending: INTERNAL MEDICINE
Payer: MEDICARE

## 2023-12-26 ENCOUNTER — TELEPHONE (OUTPATIENT)
Dept: ONCOLOGY | Facility: CLINIC | Age: 88
End: 2023-12-26

## 2023-12-26 ENCOUNTER — INFUSION THERAPY VISIT (OUTPATIENT)
Dept: INFUSION THERAPY | Facility: CLINIC | Age: 88
End: 2023-12-26
Attending: INTERNAL MEDICINE
Payer: MEDICARE

## 2023-12-26 ENCOUNTER — TELEPHONE (OUTPATIENT)
Dept: PHARMACY | Facility: CLINIC | Age: 88
End: 2023-12-26

## 2023-12-26 VITALS
RESPIRATION RATE: 16 BRPM | HEART RATE: 81 BPM | OXYGEN SATURATION: 96 % | DIASTOLIC BLOOD PRESSURE: 83 MMHG | TEMPERATURE: 98.1 F | WEIGHT: 138 LBS | BODY MASS INDEX: 26.07 KG/M2 | SYSTOLIC BLOOD PRESSURE: 152 MMHG

## 2023-12-26 DIAGNOSIS — C90.00 MULTIPLE MYELOMA NOT HAVING ACHIEVED REMISSION (H): Primary | ICD-10-CM

## 2023-12-26 DIAGNOSIS — I10 BENIGN ESSENTIAL HYPERTENSION: ICD-10-CM

## 2023-12-26 DIAGNOSIS — R11.0 NAUSEA: ICD-10-CM

## 2023-12-26 DIAGNOSIS — E05.90: ICD-10-CM

## 2023-12-26 LAB
ALBUMIN SERPL BCG-MCNC: 4 G/DL (ref 3.5–5.2)
ALP SERPL-CCNC: 77 U/L (ref 40–150)
ALT SERPL W P-5'-P-CCNC: 6 U/L (ref 0–50)
ANION GAP SERPL CALCULATED.3IONS-SCNC: 9 MMOL/L (ref 7–15)
AST SERPL W P-5'-P-CCNC: 10 U/L (ref 0–45)
BASOPHILS # BLD AUTO: ABNORMAL 10*3/UL
BASOPHILS # BLD MANUAL: 0.1 10E3/UL (ref 0–0.2)
BASOPHILS NFR BLD AUTO: ABNORMAL %
BASOPHILS NFR BLD MANUAL: 3 %
BILIRUB SERPL-MCNC: 0.6 MG/DL
BUN SERPL-MCNC: 26 MG/DL (ref 8–23)
CALCIUM SERPL-MCNC: 8.8 MG/DL (ref 8.8–10.2)
CHLORIDE SERPL-SCNC: 110 MMOL/L (ref 98–107)
CREAT SERPL-MCNC: 0.8 MG/DL (ref 0.51–0.95)
DEPRECATED HCO3 PLAS-SCNC: 26 MMOL/L (ref 22–29)
EGFRCR SERPLBLD CKD-EPI 2021: 71 ML/MIN/1.73M2
ELLIPTOCYTES BLD QL SMEAR: SLIGHT
EOSINOPHIL # BLD AUTO: ABNORMAL 10*3/UL
EOSINOPHIL # BLD MANUAL: 0 10E3/UL (ref 0–0.7)
EOSINOPHIL NFR BLD AUTO: ABNORMAL %
EOSINOPHIL NFR BLD MANUAL: 1 %
ERYTHROCYTE [DISTWIDTH] IN BLOOD BY AUTOMATED COUNT: 14.6 % (ref 10–15)
GLUCOSE SERPL-MCNC: 81 MG/DL (ref 70–99)
HCT VFR BLD AUTO: 33.7 % (ref 35–47)
HGB BLD-MCNC: 11.2 G/DL (ref 11.7–15.7)
IGG SERPL-MCNC: 977 MG/DL (ref 610–1616)
IMM GRANULOCYTES # BLD: ABNORMAL 10*3/UL
IMM GRANULOCYTES NFR BLD: ABNORMAL %
KAPPA LC FREE SER-MCNC: 47.72 MG/DL (ref 0.33–1.94)
KAPPA LC FREE/LAMBDA FREE SER NEPH: 128.97 {RATIO} (ref 0.26–1.65)
LAMBDA LC FREE SERPL-MCNC: 0.37 MG/DL (ref 0.57–2.63)
LYMPHOCYTES # BLD AUTO: ABNORMAL 10*3/UL
LYMPHOCYTES # BLD MANUAL: 3.1 10E3/UL (ref 0.8–5.3)
LYMPHOCYTES NFR BLD AUTO: ABNORMAL %
LYMPHOCYTES NFR BLD MANUAL: 63 %
MCH RBC QN AUTO: 30.4 PG (ref 26.5–33)
MCHC RBC AUTO-ENTMCNC: 33.2 G/DL (ref 31.5–36.5)
MCV RBC AUTO: 92 FL (ref 78–100)
MONOCYTES # BLD AUTO: ABNORMAL 10*3/UL
MONOCYTES # BLD MANUAL: 1.1 10E3/UL (ref 0–1.3)
MONOCYTES NFR BLD AUTO: ABNORMAL %
MONOCYTES NFR BLD MANUAL: 22 %
NEUTROPHILS # BLD AUTO: ABNORMAL 10*3/UL
NEUTROPHILS # BLD MANUAL: 0.5 10E3/UL (ref 1.6–8.3)
NEUTROPHILS NFR BLD AUTO: ABNORMAL %
NEUTROPHILS NFR BLD MANUAL: 11 %
NRBC # BLD AUTO: 0 10E3/UL
NRBC # BLD AUTO: 0 10E3/UL
NRBC BLD AUTO-RTO: 0 /100
NRBC BLD MANUAL-RTO: 1 %
PLAT MORPH BLD: ABNORMAL
PLATELET # BLD AUTO: 118 10E3/UL (ref 150–450)
POTASSIUM SERPL-SCNC: 3.5 MMOL/L (ref 3.4–5.3)
PROT SERPL-MCNC: 6.3 G/DL (ref 6.4–8.3)
RBC # BLD AUTO: 3.68 10E6/UL (ref 3.8–5.2)
RBC MORPH BLD: ABNORMAL
SMUDGE CELLS BLD QL SMEAR: PRESENT
SODIUM SERPL-SCNC: 145 MMOL/L (ref 135–145)
TOTAL PROTEIN SERUM FOR ELP: 6 G/DL (ref 6.4–8.3)
TSH SERPL DL<=0.005 MIU/L-ACNC: 3.97 UIU/ML (ref 0.3–4.2)
WBC # BLD AUTO: 4.9 10E3/UL (ref 4–11)

## 2023-12-26 PROCEDURE — 84165 PROTEIN E-PHORESIS SERUM: CPT | Mod: 26 | Performed by: PATHOLOGY

## 2023-12-26 PROCEDURE — 85027 COMPLETE CBC AUTOMATED: CPT | Performed by: INTERNAL MEDICINE

## 2023-12-26 PROCEDURE — 84443 ASSAY THYROID STIM HORMONE: CPT | Performed by: INTERNAL MEDICINE

## 2023-12-26 PROCEDURE — 84165 PROTEIN E-PHORESIS SERUM: CPT | Mod: TC | Performed by: PATHOLOGY

## 2023-12-26 PROCEDURE — 80053 COMPREHEN METABOLIC PANEL: CPT | Performed by: INTERNAL MEDICINE

## 2023-12-26 PROCEDURE — 250N000011 HC RX IP 250 OP 636: Mod: JZ | Performed by: INTERNAL MEDICINE

## 2023-12-26 PROCEDURE — G0463 HOSPITAL OUTPT CLINIC VISIT: HCPCS | Performed by: INTERNAL MEDICINE

## 2023-12-26 PROCEDURE — 36415 COLL VENOUS BLD VENIPUNCTURE: CPT | Performed by: INTERNAL MEDICINE

## 2023-12-26 PROCEDURE — 99214 OFFICE O/P EST MOD 30 MIN: CPT | Performed by: INTERNAL MEDICINE

## 2023-12-26 PROCEDURE — 96401 CHEMO ANTI-NEOPL SQ/IM: CPT

## 2023-12-26 PROCEDURE — 83521 IG LIGHT CHAINS FREE EACH: CPT | Performed by: INTERNAL MEDICINE

## 2023-12-26 PROCEDURE — 84155 ASSAY OF PROTEIN SERUM: CPT | Performed by: INTERNAL MEDICINE

## 2023-12-26 PROCEDURE — 85007 BL SMEAR W/DIFF WBC COUNT: CPT | Performed by: INTERNAL MEDICINE

## 2023-12-26 PROCEDURE — 82784 ASSAY IGA/IGD/IGG/IGM EACH: CPT | Performed by: INTERNAL MEDICINE

## 2023-12-26 RX ORDER — LISINOPRIL 10 MG/1
5 TABLET ORAL DAILY
COMMUNITY
Start: 2023-12-26 | End: 2024-05-06

## 2023-12-26 RX ORDER — ACETAMINOPHEN 325 MG/1
650 TABLET ORAL
Status: CANCELLED | OUTPATIENT
Start: 2023-12-26

## 2023-12-26 RX ORDER — DEXAMETHASONE 4 MG/1
12 TABLET ORAL
Status: CANCELLED | OUTPATIENT
Start: 2023-12-26

## 2023-12-26 RX ORDER — ALBUTEROL SULFATE 0.83 MG/ML
2.5 SOLUTION RESPIRATORY (INHALATION)
Status: CANCELLED | OUTPATIENT
Start: 2023-12-26

## 2023-12-26 RX ORDER — ALBUTEROL SULFATE 90 UG/1
1-2 AEROSOL, METERED RESPIRATORY (INHALATION)
Status: CANCELLED
Start: 2023-12-26

## 2023-12-26 RX ORDER — DIPHENHYDRAMINE HCL 25 MG
50 CAPSULE ORAL
Status: CANCELLED | OUTPATIENT
Start: 2023-12-26

## 2023-12-26 RX ORDER — HEPARIN SODIUM (PORCINE) LOCK FLUSH IV SOLN 100 UNIT/ML 100 UNIT/ML
5 SOLUTION INTRAVENOUS
Status: CANCELLED | OUTPATIENT
Start: 2023-12-26

## 2023-12-26 RX ORDER — DIPHENHYDRAMINE HYDROCHLORIDE 50 MG/ML
50 INJECTION INTRAMUSCULAR; INTRAVENOUS
Status: CANCELLED
Start: 2023-12-26

## 2023-12-26 RX ORDER — ONDANSETRON 8 MG/1
8 TABLET, FILM COATED ORAL EVERY 8 HOURS PRN
Qty: 30 TABLET | Refills: 3 | Status: SHIPPED | OUTPATIENT
Start: 2023-12-26

## 2023-12-26 RX ORDER — EPINEPHRINE 1 MG/ML
0.3 INJECTION, SOLUTION INTRAMUSCULAR; SUBCUTANEOUS EVERY 5 MIN PRN
Status: CANCELLED | OUTPATIENT
Start: 2023-12-26

## 2023-12-26 RX ORDER — METHYLPREDNISOLONE SODIUM SUCCINATE 125 MG/2ML
125 INJECTION, POWDER, LYOPHILIZED, FOR SOLUTION INTRAMUSCULAR; INTRAVENOUS
Status: CANCELLED
Start: 2023-12-26

## 2023-12-26 RX ORDER — PROCHLORPERAZINE MALEATE 10 MG
10 TABLET ORAL EVERY 6 HOURS PRN
Qty: 30 TABLET | Refills: 6 | Status: SHIPPED | OUTPATIENT
Start: 2023-12-26

## 2023-12-26 RX ORDER — HEPARIN SODIUM,PORCINE 10 UNIT/ML
5 VIAL (ML) INTRAVENOUS
Status: CANCELLED | OUTPATIENT
Start: 2023-12-26

## 2023-12-26 RX ORDER — LORAZEPAM 2 MG/ML
0.5 INJECTION INTRAMUSCULAR EVERY 4 HOURS PRN
Status: CANCELLED | OUTPATIENT
Start: 2023-12-26

## 2023-12-26 RX ORDER — MEPERIDINE HYDROCHLORIDE 25 MG/ML
25 INJECTION INTRAMUSCULAR; INTRAVENOUS; SUBCUTANEOUS EVERY 30 MIN PRN
Status: CANCELLED | OUTPATIENT
Start: 2023-12-26

## 2023-12-26 RX ADMIN — DARATUMUMAB AND HYALURONIDASE-FIHJ (HUMAN RECOMBINANT) 1800 MG: 1800; 30000 INJECTION SUBCUTANEOUS at 10:30

## 2023-12-26 ASSESSMENT — PAIN SCALES - GENERAL: PAINLEVEL: NO PAIN (0)

## 2023-12-26 NOTE — LETTER
12/26/2023         RE: Quiana Dunaway  [address] [facility] [address]        Dear Colleague,    Thank you for referring your patient, Quiana Dunaway, to the [facility] [place]. Please see a copy of my visit note below.    HEMATOLOGY HISTORY: Mrs. Dunaway is a lady with multiple myeloma (IgG kappa). High risk, t(14;16).  1. On 05/01/2017, WBC of 3.4, hemoglobin of 10.2 and platelet of 154.  2. SPEP on 07/07/2017 revealed M-spike of 1.8.  3. Bone marrow biopsy on 07/12/2017 reveals kappa monotypic plasma cell population consistent with multiple myeloma.  Bone marrow is 70% cellular.  Plasma cell is 50-60%.     -There is gain of chromosome 1, 5, 9, 15, and 17.  There is translocation 14;16.   4.  On 07/18/2017:  -M-spike of 1.9.   -Immunofixation reveals monoclonal IgG kappa.    -IgG level of 2970.  IgA of 15 and IgM of 175.    -Kappa free light chain of 67.7.  Lambda free light chain of 1.42.  Ratio of kappa to lambda of 47.71.    -Beta 2 microglobulin of 3.4.   5. PET scan on 07/24/2017 reveals several focal areas of increased FDG uptake consistent with multiple myeloma.  There is probable epithelial cyst in tail of the pancreas.  No associated abnormal FDG activity.  Left thyroid cysts or nodules without any FDG activity.  There is a 0.3 cm left upper lobe lung nodule.   6.  Velcade, Revlimid and dexamethasone between on 08/14/2017 and 04/30/2018. Velcade stopped.   -Revlimid and dexamethasone continued.  -Revlimid held between 12/27/2021 and 03/08/2022.  -Daratumumab added on 11/14/2022.  -Changed to daratumumab, pomalidomide and dexamethasone on 06/12/2023.  7. CT chest, abdomen, and pelvis on 12/30/2021 does not reveal any malignancy.  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     SUBJECTIVE:  Ms. Dunaway is an 88-year-old female with multiple myeloma on daratumumab, pomalidomide and dexamethasone.       Zometa has been on hold for dental  work.     Overall she is tolerating treatment well.  She has generalized weakness secondary to myeloma, her age and medications.  She has arthritic joint pain mainly in the right shoulder.  No worsening of pain.     She has chronic dizziness.  No worsening. No headache.  No chest pain.  No shortness of breath.  No abdominal pain. Occasional nausea. No vomiting.  No urinary complaint.  She gets intermittent diarrhea.  No bleeding.  No fever or night sweats.  All other review of system is negative.      PHYSICAL EXAMINATION:   Alert and oriented x 3.  Not in distress.  Vitals: Reviewed.  Rest of the systems not examined.     LABORATORY: CBC, SPEP, IgG and free light chain reviewed.      ASSESSMENT:    1.  An 88-year-old female with multiple myeloma on daratumumab, pomalidomide and dexamethasone.  Myeloma is stable.  2.  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 Anemia is stable.  3.  Chronic dizziness.  4.  Generalized weakness from myeloma, anemia, medication and old age.  5.  Arthritic joint pain especially in shoulders.   6.  Thrombocytopenia.     PLAN:  Continue pomalidomid, daratumumab and dexamethasone.  Take lisinopril 5 mg a day.  See NP in 4 weeks.  See me in 8 weeks    DISCUSSION:  1.  Patient is overall doing well for her age.  Labs were reviewed with her.  Explained to her that myeloma is overall stable.    She will continue on daratumumab, pomalidomide and dexamethasone.  She is at reduced dose of pomalidomide at 2 mg a day which she is tolerating well.  She will continue on the same treatment.  Explained to the patient that given her age, our minimum is to keep the disease stable.    2.  Patient has not yet gotten her dental workup.  Bisphosphonate will not be started.    3.  Her blood pressure is remaining mildly high.  She used to be on lisinopril.  She has not been taking it.  I told her to take lisinopril 5 mg a day.  Previously she was taking 10 mg a day.  Hopefully her blood pressure will be better  "controlled.    4.  Her platelet is lower at 118.  Will monitor it.  Advised her to call us if any bleeding/bruising.    5.  She and her daughter had a few questions all answered.  I will see her in 2 months time.  In between she will see our ROSA MARIA.     TOTAL VISIT TIME: 30 minutes.  Time spent in today's visit, review of chart/investigations today, monitoring for toxicity of high risk medications and documentation today.    Oncology Rooming Note    December 26, 2023 9:56 AM   Quiana uDnaway is a 88 year old female who presents for:    Chief Complaint   Patient presents with     Oncology Clinic Visit     Initial Vitals: BP (!) 152/83   Pulse 81   Temp 98.1  F (36.7  C) (Oral)   Resp 16   Wt 62.6 kg (138 lb)   SpO2 96%   BMI 26.07 kg/m   Estimated body mass index is 26.07 kg/m  as calculated from the following:    Height as of 11/30/23: 1.549 m (5' 1\").    Weight as of this encounter: 62.6 kg (138 lb). Body surface area is 1.64 meters squared.  No Pain (0) Comment: Data Unavailable   No LMP recorded. Patient is postmenopausal.  Allergies reviewed: Yes  Medications reviewed: Yes    Medications: Medication refills not needed today.  Pharmacy name entered into [employer]:    [facility] - [place], [place] - [address]  [id number] - [place], [place] - [address] AT [facility]    Frailty Screening:   Is the patient here for a new oncology consult visit in cancer care? 2. No      Clinical concerns:   doctor was notified.      Mariela Lyn [facility]                Again, thank you for allowing me to participate in the care of your patient.        Sincerely,        Alex Parry MD  "

## 2023-12-26 NOTE — PROGRESS NOTES
Medical Assistant Note:  Quiana Dunaway presents today for lab draw.    Patient seen by provider today: Yes: Dr Parry.   present during visit today: Not Applicable.    Concerns: No Concerns.    Procedure:  Lab draw site: LAC, Needle type: BF, Gauge: 21. Gauze and coban applied    Post Assessment:  Labs drawn without difficulty: Yes.    Discharge Plan:  Departure Mode: Ambulatory.    Face to Face Time: 5.    Sultana Jones CMA

## 2023-12-26 NOTE — PROGRESS NOTES
"Oncology Rooming Note    December 26, 2023 9:56 AM   Quiana Dunaway is a 88 year old female who presents for:    Chief Complaint   Patient presents with    Oncology Clinic Visit     Initial Vitals: BP (!) 152/83   Pulse 81   Temp 98.1  F (36.7  C) (Oral)   Resp 16   Wt 62.6 kg (138 lb)   SpO2 96%   BMI 26.07 kg/m   Estimated body mass index is 26.07 kg/m  as calculated from the following:    Height as of 11/30/23: 1.549 m (5' 1\").    Weight as of this encounter: 62.6 kg (138 lb). Body surface area is 1.64 meters squared.  No Pain (0) Comment: Data Unavailable   No LMP recorded. Patient is postmenopausal.  Allergies reviewed: Yes  Medications reviewed: Yes    Medications: Medication refills not needed today.  Pharmacy name entered into [employer]:    [facility] - [place], [place] - [address]  [id number] - [place], [place] - [address] AT [facility] [address]    Frailty Screening:   Is the patient here for a new oncology consult visit in cancer care? 2. No      Clinical concerns:   doctor was notified.      Mariela Lyn CMA              "

## 2023-12-26 NOTE — TELEPHONE ENCOUNTER
Oral Chemotherapy Monitoring Program   Medication: Pomalyst  Rx: 2mg PO daily on days 1 through 14 of 21 day cycle   Auth #:   [id number] obtained on 12/26/2023  Risk Category: adult female of non child bearing potential  Routine survey questions reviewed.   Rx to be Escribed to [facility]     Terrie Bowen [facility] Oncology Pharmacy Liaison  [phone]

## 2023-12-26 NOTE — PROGRESS NOTES
HEMATOLOGY HISTORY: Mrs. Dunaway is a lady with multiple myeloma (IgG kappa). High risk, t(14;16).  1. On 05/01/2017, WBC of 3.4, hemoglobin of 10.2 and platelet of 154.  2. SPEP on 07/07/2017 revealed M-spike of 1.8.  3. Bone marrow biopsy on 07/12/2017 reveals kappa monotypic plasma cell population consistent with multiple myeloma.  Bone marrow is 70% cellular.  Plasma cell is 50-60%.     -There is gain of chromosome 1, 5, 9, 15, and 17.  There is translocation 14;16.   4.  On 07/18/2017:  -M-spike of 1.9.   -Immunofixation reveals monoclonal IgG kappa.    -IgG level of 2970.  IgA of 15 and IgM of 175.    -Kappa free light chain of 67.7.  Lambda free light chain of 1.42.  Ratio of kappa to lambda of 47.71.    -Beta 2 microglobulin of 3.4.   5. PET scan on 07/24/2017 reveals several focal areas of increased FDG uptake consistent with multiple myeloma.  There is probable epithelial cyst in tail of the pancreas.  No associated abnormal FDG activity.  Left thyroid cysts or nodules without any FDG activity.  There is a 0.3 cm left upper lobe lung nodule.   6.  Velcade, Revlimid and dexamethasone between on 08/14/2017 and 04/30/2018. Velcade stopped.   -Revlimid and dexamethasone continued.  -Revlimid held between 12/27/2021 and 03/08/2022.  -Daratumumab added on 11/14/2022.  -Changed to daratumumab, pomalidomide and dexamethasone on 06/12/2023.  7. CT chest, abdomen, and pelvis on 12/30/2021 does not reveal any malignancy.  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     SUBJECTIVE:  Ms. Dunaway is an 88-year-old female with multiple myeloma on daratumumab, pomalidomide and dexamethasone.       Zometa has been on hold for dental work.     Overall she is tolerating treatment well.  She has generalized weakness secondary to myeloma, her age and medications.  She has arthritic joint pain mainly in the right shoulder.  No worsening of pain.     She has chronic dizziness.  No worsening. No  headache.  No chest pain.  No shortness of breath.  No abdominal pain. Occasional nausea. No vomiting.  No urinary complaint.  She gets intermittent diarrhea.  No bleeding.  No fever or night sweats.  All other review of system is negative.      PHYSICAL EXAMINATION:   Alert and oriented x 3.  Not in distress.  Vitals: Reviewed.  Rest of the systems not examined.     LABORATORY: CBC, SPEP, IgG and free light chain reviewed.      ASSESSMENT:    1.  An 88-year-old female with multiple myeloma on daratumumab, pomalidomide and dexamethasone.  Myeloma is stable.  2.  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 Anemia is stable.  3.  Chronic dizziness.  4.  Generalized weakness from myeloma, anemia, medication and old age.  5.  Arthritic joint pain especially in shoulders.   6.  Thrombocytopenia.     PLAN:  Continue pomalidomid, daratumumab and dexamethasone.  Take lisinopril 5 mg a day.  See NP in 4 weeks.  See me in 8 weeks    DISCUSSION:  1.  Patient is overall doing well for her age.  Labs were reviewed with her.  Explained to her that myeloma is overall stable.    She will continue on daratumumab, pomalidomide and dexamethasone.  She is at reduced dose of pomalidomide at 2 mg a day which she is tolerating well.  She will continue on the same treatment.  Explained to the patient that given her age, our minimum is to keep the disease stable.    2.  Patient has not yet gotten her dental workup.  Bisphosphonate will not be started.    3.  Her blood pressure is remaining mildly high.  She used to be on lisinopril.  She has not been taking it.  I told her to take lisinopril 5 mg a day.  Previously she was taking 10 mg a day.  Hopefully her blood pressure will be better controlled.    4.  Her platelet is lower at 118.  Will monitor it.  Advised her to call us if any bleeding/bruising.    5.  She and her daughter had a few questions all answered.  I will see her in 2 months time.  In between she will see our ROSA MARIA.     TOTAL VISIT  TIME: 30 minutes.  Time spent in today's visit, review of chart/investigations today, monitoring for toxicity of high risk medications and documentation today.

## 2023-12-26 NOTE — PATIENT INSTRUCTIONS
Continue pomalidomid, daratumumab and dexamethasone.  Take lisinopril 5 mg a day.  See NP in 4 weeks.  See me in 8 weeks

## 2023-12-26 NOTE — PROGRESS NOTES
Infusion Nursing Note:  Quiana Dunaway presents today for C15D1 Darzalex Faspro.    Patient seen by provider today: Yes: Dr. Parry   present during visit today: Not Applicable.    Note: ANC 0.5, okay to proceed with treatment per Dr. Parry.      Intravenous Access:  No Intravenous access/labs at this visit.    Treatment Conditions:  Lab Results   Component Value Date    HGB 11.2 (L) 12/26/2023    WBC 4.9 12/26/2023    ANEU 2.0 11/28/2023    ANEUTAUTO 1.9 10/31/2023     (L) 12/26/2023        Lab Results   Component Value Date     12/26/2023    POTASSIUM 3.5 12/26/2023    MAG 2.4 (H) 05/26/2020    CR 0.80 12/26/2023    HUMBERTO 8.8 12/26/2023    BILITOTAL 0.6 12/26/2023    ALBUMIN 4.0 12/26/2023    ALT 6 12/26/2023    AST 10 12/26/2023       Results reviewed, labs did NOT meet treatment parameters: ANC > 1200. ANC 0.5, okay to proceed with treatment per Dr. Parry.      Post Infusion Assessment:  Patient tolerated injection without incident.  Site patent and intact, free from redness, edema or discomfort.  No evidence of extravasations.       Discharge Plan:   Discharge instructions reviewed with: Patient and Family.  Patient and/or family verbalized understanding of discharge instructions and all questions answered.  AVS to patient via [employer].  Patient will return as scheduled for next appointment.   Patient discharged in stable condition accompanied by: self and daughter.  Departure Mode: Ambulatory.      Tonja Lopez RN

## 2023-12-27 ENCOUNTER — TELEPHONE (OUTPATIENT)
Dept: ONCOLOGY | Facility: CLINIC | Age: 88
End: 2023-12-27
Payer: MEDICARE

## 2023-12-27 DIAGNOSIS — C90.00 MULTIPLE MYELOMA NOT HAVING ACHIEVED REMISSION (H): Primary | ICD-10-CM

## 2023-12-27 LAB
ALBUMIN SERPL ELPH-MCNC: 3.8 G/DL (ref 3.7–5.1)
ALPHA1 GLOB SERPL ELPH-MCNC: 0.3 G/DL (ref 0.2–0.4)
ALPHA2 GLOB SERPL ELPH-MCNC: 0.6 G/DL (ref 0.5–0.9)
B-GLOBULIN SERPL ELPH-MCNC: 0.5 G/DL (ref 0.6–1)
GAMMA GLOB SERPL ELPH-MCNC: 0.8 G/DL (ref 0.7–1.6)
LOCATION OF TASK: ABNORMAL
M PROTEIN SERPL ELPH-MCNC: 0.7 G/DL
PROT PATTERN SERPL ELPH-IMP: ABNORMAL

## 2023-12-27 RX ORDER — DEXAMETHASONE 4 MG/1
4 TABLET ORAL WEEKLY
Qty: 16 TABLET | Refills: 2 | Status: SHIPPED | OUTPATIENT
Start: 2023-12-31 | End: 2024-04-16

## 2024-01-08 ENCOUNTER — OFFICE VISIT (OUTPATIENT)
Dept: FAMILY MEDICINE | Facility: CLINIC | Age: 89
End: 2024-01-08
Payer: MEDICARE

## 2024-01-08 VITALS
SYSTOLIC BLOOD PRESSURE: 155 MMHG | BODY MASS INDEX: 26.98 KG/M2 | TEMPERATURE: 98 F | HEART RATE: 87 BPM | DIASTOLIC BLOOD PRESSURE: 77 MMHG | WEIGHT: 142.9 LBS | HEIGHT: 61 IN | RESPIRATION RATE: 18 BRPM | OXYGEN SATURATION: 95 %

## 2024-01-08 DIAGNOSIS — E05.20 TOXIC MULTINODUL GOITER: ICD-10-CM

## 2024-01-08 DIAGNOSIS — R42 VERTIGO: Primary | ICD-10-CM

## 2024-01-08 DIAGNOSIS — I10 BENIGN ESSENTIAL HYPERTENSION: ICD-10-CM

## 2024-01-08 PROCEDURE — 99213 OFFICE O/P EST LOW 20 MIN: CPT | Performed by: INTERNAL MEDICINE

## 2024-01-08 ASSESSMENT — PAIN SCALES - GENERAL: PAINLEVEL: NO PAIN (0)

## 2024-01-08 NOTE — PROGRESS NOTES
"  Assessment & Plan     Vertigo  Discussed obtaining a brain MRI to further investigate, but she states symptoms are no different to those in 12/2022 when MRI was negative, so she declined.  Again, she does not wish to return to PT to help with this.  She can use the meclizine as needed.      Benign essential hypertension  Blood pressure a little high in clinic today, but we can recheck next month and adjust lisinopril accordingly if it remains elevated     Toxic multinodul goiter  Recent TSH was OK       No LOS data to display   Time spent by me doing chart review, history and exam, documentation and further activities per the note           FUTURE APPOINTMENTS:       -      Follow-up with whom? Me Follow-Up for what? Chronic Disease f/u Chronic Disease f/u: Hypertension How? In Person Future, Expected: 2/8/2024 Approximate   Patient instructed to return to clinic or contact us sooner if symptoms worsen or new symptoms develop.     César Chung MD  [facility] MAGALY Araya is a 88 year old, presenting for the following health issues:  Follow Up and Hypertension        1/8/2024     1:20 PM   Additional Questions   Roomed by Omar   Accompanied by daughter in law       HPI     Follow up myeloma, hypertension, anxiety, hyperthyroidism  Her intermittent vertigo has returned   Better today than yesterday  She does not want to return to PT for it  The meclizine helps   Symptoms improve when she is off chemotherapy for a week           Review of Systems         Objective    BP (!) 155/77 (BP Location: Left arm, Patient Position: Sitting, Cuff Size: Adult Small)   Pulse 87   Temp 98  F (36.7  C) (Oral)   Resp 18   Ht 1.549 m (5' 1\")   Wt 64.8 kg (142 lb 14.4 oz)   SpO2 95%   BMI 27.00 kg/m    Body mass index is 27 kg/m .  Physical Exam   GENERAL: healthy, alert and no distress  RESP: lungs clear to auscultation - no rales, rhonchi or wheezes  CV: Heart with regular rate and rhythm. "   MS: no gross musculoskeletal defects noted, no edema  NEURO: Normal strength and tone, mentation intact and speech normal  PSYCH: mentation appears normal, affect normal/bright

## 2024-01-11 DIAGNOSIS — C90.00 MULTIPLE MYELOMA NOT HAVING ACHIEVED REMISSION (H): Primary | ICD-10-CM

## 2024-01-12 ENCOUNTER — TELEPHONE (OUTPATIENT)
Dept: PHARMACY | Facility: CLINIC | Age: 89
End: 2024-01-12
Payer: MEDICARE

## 2024-01-12 NOTE — TELEPHONE ENCOUNTER
Oral Chemotherapy Monitoring Program   Medication: Pomalyst  Rx: 2mg PO daily on days 1 through 14 of 21 day cycle   Auth #:  [id number] obtained on 1/12/24  Risk Category: adult female of non child bearing potential  Routine survey questions reviewed.   Rx to be Escribed to [facility]     Terrie Bowen [facility] Oncology Pharmacy Liaison  [phone]

## 2024-01-18 NOTE — PATIENT INSTRUCTIONS
Continue treatment.  See NP with next 2 treatment.  See me in 6-8 weeks.    Please call to schedule   show

## 2024-01-24 ENCOUNTER — LAB (OUTPATIENT)
Dept: INFUSION THERAPY | Facility: CLINIC | Age: 89
End: 2024-01-24
Attending: NURSE PRACTITIONER
Payer: MEDICARE

## 2024-01-24 ENCOUNTER — ONCOLOGY VISIT (OUTPATIENT)
Dept: ONCOLOGY | Facility: CLINIC | Age: 89
End: 2024-01-24
Attending: NURSE PRACTITIONER
Payer: MEDICARE

## 2024-01-24 VITALS
DIASTOLIC BLOOD PRESSURE: 79 MMHG | SYSTOLIC BLOOD PRESSURE: 137 MMHG | BODY MASS INDEX: 26.26 KG/M2 | HEART RATE: 83 BPM | TEMPERATURE: 98.3 F | WEIGHT: 139 LBS | OXYGEN SATURATION: 95 % | RESPIRATION RATE: 16 BRPM

## 2024-01-24 DIAGNOSIS — C90.00 MULTIPLE MYELOMA NOT HAVING ACHIEVED REMISSION (H): Primary | ICD-10-CM

## 2024-01-24 DIAGNOSIS — L60.0 INGROWN NAIL: ICD-10-CM

## 2024-01-24 LAB
ACANTHOCYTES BLD QL SMEAR: ABNORMAL
ALBUMIN SERPL BCG-MCNC: 3.9 G/DL (ref 3.5–5.2)
ALP SERPL-CCNC: 98 U/L (ref 40–150)
ALT SERPL W P-5'-P-CCNC: 11 U/L (ref 0–50)
ANION GAP SERPL CALCULATED.3IONS-SCNC: 7 MMOL/L (ref 7–15)
AST SERPL W P-5'-P-CCNC: 14 U/L (ref 0–45)
AUER BODIES BLD QL SMEAR: ABNORMAL
BASO STIPL BLD QL SMEAR: ABNORMAL
BASOPHILS # BLD AUTO: 0 10E3/UL (ref 0–0.2)
BASOPHILS NFR BLD AUTO: 1 %
BILIRUB SERPL-MCNC: 0.3 MG/DL
BITE CELLS BLD QL SMEAR: ABNORMAL
BLISTER CELLS BLD QL SMEAR: ABNORMAL
BUN SERPL-MCNC: 25.1 MG/DL (ref 8–23)
BURR CELLS BLD QL SMEAR: ABNORMAL
CALCIUM SERPL-MCNC: 8.9 MG/DL (ref 8.8–10.2)
CHLORIDE SERPL-SCNC: 108 MMOL/L (ref 98–107)
CREAT SERPL-MCNC: 0.65 MG/DL (ref 0.51–0.95)
DACRYOCYTES BLD QL SMEAR: ABNORMAL
DEPRECATED HCO3 PLAS-SCNC: 28 MMOL/L (ref 22–29)
EGFRCR SERPLBLD CKD-EPI 2021: 85 ML/MIN/1.73M2
ELLIPTOCYTES BLD QL SMEAR: SLIGHT
EOSINOPHIL # BLD AUTO: 0.1 10E3/UL (ref 0–0.7)
EOSINOPHIL NFR BLD AUTO: 2 %
ERYTHROCYTE [DISTWIDTH] IN BLOOD BY AUTOMATED COUNT: 13.9 % (ref 10–15)
FRAGMENTS BLD QL SMEAR: ABNORMAL
GLUCOSE SERPL-MCNC: 140 MG/DL (ref 70–99)
HCT VFR BLD AUTO: 32.8 % (ref 35–47)
HGB BLD-MCNC: 11 G/DL (ref 11.7–15.7)
HGB C CRYSTALS: ABNORMAL
HOWELL-JOLLY BOD BLD QL SMEAR: ABNORMAL
IMM GRANULOCYTES # BLD: 0 10E3/UL
IMM GRANULOCYTES NFR BLD: 1 %
LYMPHOCYTES # BLD AUTO: 2.3 10E3/UL (ref 0.8–5.3)
LYMPHOCYTES NFR BLD AUTO: 46 %
MCH RBC QN AUTO: 30.6 PG (ref 26.5–33)
MCHC RBC AUTO-ENTMCNC: 33.5 G/DL (ref 31.5–36.5)
MCV RBC AUTO: 91 FL (ref 78–100)
MONOCYTES # BLD AUTO: 1.3 10E3/UL (ref 0–1.3)
MONOCYTES NFR BLD AUTO: 26 %
NEUTROPHILS # BLD AUTO: 1.1 10E3/UL (ref 1.6–8.3)
NEUTROPHILS NFR BLD AUTO: 24 %
NEUTS HYPERSEG BLD QL SMEAR: ABNORMAL
NRBC # BLD AUTO: 0 10E3/UL
NRBC BLD AUTO-RTO: 0 /100
PLAT MORPH BLD: ABNORMAL
PLATELET # BLD AUTO: 166 10E3/UL (ref 150–450)
POLYCHROMASIA BLD QL SMEAR: ABNORMAL
POTASSIUM SERPL-SCNC: 3.8 MMOL/L (ref 3.4–5.3)
PROT SERPL-MCNC: 6.5 G/DL (ref 6.4–8.3)
RBC # BLD AUTO: 3.59 10E6/UL (ref 3.8–5.2)
RBC AGGLUT BLD QL: ABNORMAL
RBC MORPH BLD: ABNORMAL
ROULEAUX BLD QL SMEAR: ABNORMAL
SICKLE CELLS BLD QL SMEAR: ABNORMAL
SMUDGE CELLS BLD QL SMEAR: ABNORMAL
SODIUM SERPL-SCNC: 143 MMOL/L (ref 135–145)
SPHEROCYTES BLD QL SMEAR: ABNORMAL
STOMATOCYTES BLD QL SMEAR: ABNORMAL
TARGETS BLD QL SMEAR: ABNORMAL
TOTAL PROTEIN SERUM FOR ELP: 6 G/DL (ref 6.4–8.3)
TOXIC GRANULES BLD QL SMEAR: ABNORMAL
VARIANT LYMPHS BLD QL SMEAR: ABNORMAL
WBC # BLD AUTO: 4.9 10E3/UL (ref 4–11)

## 2024-01-24 PROCEDURE — 250N000011 HC RX IP 250 OP 636: Mod: JZ | Performed by: NURSE PRACTITIONER

## 2024-01-24 PROCEDURE — 84155 ASSAY OF PROTEIN SERUM: CPT | Mod: 91 | Performed by: NURSE PRACTITIONER

## 2024-01-24 PROCEDURE — 84165 PROTEIN E-PHORESIS SERUM: CPT | Mod: 26 | Performed by: PATHOLOGY

## 2024-01-24 PROCEDURE — 96401 CHEMO ANTI-NEOPL SQ/IM: CPT

## 2024-01-24 PROCEDURE — 99214 OFFICE O/P EST MOD 30 MIN: CPT | Performed by: NURSE PRACTITIONER

## 2024-01-24 PROCEDURE — 82784 ASSAY IGA/IGD/IGG/IGM EACH: CPT | Performed by: NURSE PRACTITIONER

## 2024-01-24 PROCEDURE — 85025 COMPLETE CBC W/AUTO DIFF WBC: CPT | Performed by: INTERNAL MEDICINE

## 2024-01-24 PROCEDURE — 80053 COMPREHEN METABOLIC PANEL: CPT | Performed by: INTERNAL MEDICINE

## 2024-01-24 PROCEDURE — G0463 HOSPITAL OUTPT CLINIC VISIT: HCPCS | Mod: 25 | Performed by: NURSE PRACTITIONER

## 2024-01-24 PROCEDURE — 36415 COLL VENOUS BLD VENIPUNCTURE: CPT | Performed by: NURSE PRACTITIONER

## 2024-01-24 PROCEDURE — 84165 PROTEIN E-PHORESIS SERUM: CPT | Mod: TC | Performed by: PATHOLOGY

## 2024-01-24 PROCEDURE — 83521 IG LIGHT CHAINS FREE EACH: CPT | Mod: 59 | Performed by: NURSE PRACTITIONER

## 2024-01-24 RX ORDER — ALBUTEROL SULFATE 0.83 MG/ML
2.5 SOLUTION RESPIRATORY (INHALATION)
Status: CANCELLED | OUTPATIENT
Start: 2024-01-24

## 2024-01-24 RX ORDER — ALBUTEROL SULFATE 90 UG/1
1-2 AEROSOL, METERED RESPIRATORY (INHALATION)
Status: CANCELLED
Start: 2024-01-24

## 2024-01-24 RX ORDER — DIPHENHYDRAMINE HCL 25 MG
50 CAPSULE ORAL
Status: CANCELLED | OUTPATIENT
Start: 2024-01-24

## 2024-01-24 RX ORDER — MEPERIDINE HYDROCHLORIDE 25 MG/ML
25 INJECTION INTRAMUSCULAR; INTRAVENOUS; SUBCUTANEOUS EVERY 30 MIN PRN
Status: CANCELLED | OUTPATIENT
Start: 2024-01-24

## 2024-01-24 RX ORDER — CEPHALEXIN 500 MG/1
500 CAPSULE ORAL 2 TIMES DAILY
Qty: 14 CAPSULE | Refills: 0 | Status: SHIPPED | OUTPATIENT
Start: 2024-01-24 | End: 2024-01-31

## 2024-01-24 RX ORDER — HEPARIN SODIUM (PORCINE) LOCK FLUSH IV SOLN 100 UNIT/ML 100 UNIT/ML
5 SOLUTION INTRAVENOUS
Status: CANCELLED | OUTPATIENT
Start: 2024-01-24

## 2024-01-24 RX ORDER — HEPARIN SODIUM,PORCINE 10 UNIT/ML
5 VIAL (ML) INTRAVENOUS
Status: CANCELLED | OUTPATIENT
Start: 2024-01-24

## 2024-01-24 RX ORDER — EPINEPHRINE 1 MG/ML
0.3 INJECTION, SOLUTION INTRAMUSCULAR; SUBCUTANEOUS EVERY 5 MIN PRN
Status: CANCELLED | OUTPATIENT
Start: 2024-01-24

## 2024-01-24 RX ORDER — LORAZEPAM 2 MG/ML
0.5 INJECTION INTRAMUSCULAR EVERY 4 HOURS PRN
Status: CANCELLED | OUTPATIENT
Start: 2024-01-24

## 2024-01-24 RX ORDER — ACETAMINOPHEN 325 MG/1
650 TABLET ORAL
Status: CANCELLED | OUTPATIENT
Start: 2024-01-24

## 2024-01-24 RX ORDER — DEXAMETHASONE 4 MG/1
12 TABLET ORAL
Status: CANCELLED | OUTPATIENT
Start: 2024-01-24

## 2024-01-24 RX ORDER — METHYLPREDNISOLONE SODIUM SUCCINATE 125 MG/2ML
125 INJECTION, POWDER, LYOPHILIZED, FOR SOLUTION INTRAMUSCULAR; INTRAVENOUS
Status: CANCELLED
Start: 2024-01-24

## 2024-01-24 RX ORDER — DIPHENHYDRAMINE HYDROCHLORIDE 50 MG/ML
50 INJECTION INTRAMUSCULAR; INTRAVENOUS
Status: CANCELLED
Start: 2024-01-24

## 2024-01-24 RX ADMIN — DARATUMUMAB AND HYALURONIDASE-FIHJ (HUMAN RECOMBINANT) 1800 MG: 1800; 30000 INJECTION SUBCUTANEOUS at 14:05

## 2024-01-24 ASSESSMENT — PAIN SCALES - GENERAL: PAINLEVEL: NO PAIN (0)

## 2024-01-24 NOTE — LETTER
"    1/24/2024         RE: Quiana Dunaway  [address] [facility] [address]        Dear Colleague,    Thank you for referring your patient, Quiana Dunaway, to the [facility]. Please see a copy of my visit note below.    Oncology Rooming Note    January 24, 2024 1:30 PM   Quiana Dunaway is a 88 year old female who presents for:    Chief Complaint   Patient presents with     Oncology Clinic Visit     Initial Vitals: /79   Pulse 83   Temp 98.3  F (36.8  C) (Oral)   Resp 16   Wt 63 kg (139 lb)   SpO2 95%   BMI 26.26 kg/m   Estimated body mass index is 26.26 kg/m  as calculated from the following:    Height as of 1/8/24: 1.549 m (5' 1\").    Weight as of this encounter: 63 kg (139 lb). Body surface area is 1.65 meters squared.  No Pain (0) Comment: Data Unavailable   No LMP recorded. Patient is postmenopausal.  Allergies reviewed: Yes  Medications reviewed: Yes    Medications: Medication refills not needed today.  Pharmacy name entered into [employer]:    [facility] - [place], [place] - [address]  [id number] - [place], [place] - [address] AT [facility] [address]    Frailty Screening:   Is the patient here for a new oncology consult visit in cancer care? 2. No      Clinical concerns:   np was notified.      Mariela Lyn [facility]                Oncology/Hematology Visit Note  Jan 24, 2024    Reason for Visit: follow up of multiple myeloma IgG kappa high risk  Diagnosed 2017  Patient Dr. Parry  Patient is undergoing treatment with daratumumab pomalidomide and dexamethasone  -Due to the side effects Pomalyst reduced to 2 mg 2 weeks on 1 week off      Interval History:  Patient complains of some redness around the right big toe nail.  Denies pain denies fever chills sweats    Review of Systems:  14 point ROS of systems including Constitutional, Eyes, Respiratory, Cardiovascular, Gastroenterology, Genitourinary, " Integumentary, Muscularskeletal, Psychiatric were all negative except for pertinent positives noted in my HPI.    Physical Examination:  Physical Exam  HENT:      Head: Normocephalic.      Right Ear: Tympanic membrane normal.      Nose: Nose normal.      Mouth/Throat:      Mouth: Mucous membranes are moist.   Eyes:      Pupils: Pupils are equal, round, and reactive to light.   Cardiovascular:      Rate and Rhythm: Normal rate.   Pulmonary:      Effort: Pulmonary effort is normal.   Abdominal:      General: Abdomen is flat.   Musculoskeletal:      Cervical back: Normal range of motion.   Skin:     General: Skin is warm.   Neurological:      General: No focal deficit present.      Mental Status: She is alert.   Psychiatric:         Mood and Affect: Mood normal.     --Redness around right big toenail, no discharge noted    LABS  -CBC reviewed    Assessment and Plan:    multiple myeloma IgG kappa high risk  Diagnosed 2017  Patient Dr. Parry  Patient is undergoing treatment with daratumumab, pomalidomide 3 mg- 3 weeks on 1 week off and 1 week off dexamethasone 4 mg once a week  -Patient is responding to treatment  Due to the side effects Pomalyst changed to to 2 mg 2 weeks on 1 week off  -Overall stable multiple myeloma labs.  -Myeloma labs from today are pending  Patient is scheduled to see Dr. 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  Neutropenic precautions .  Avoid crowded places and sick contacts Check temperature frequently in the event of fever chills sweats or any signs symptoms of infection go to ER        Anemia   Patient denies bleeding  Overall stable hemoglobin      Prophylaxis  Continue with aspirin and acyclovir    Bone health  Continue to hold Zometa until dental work-up is done    Generalized weakness  Chronic  issue for the patient    Right big toe ingrown nail  -Referred to podiatry  -Patient is a is at risk for infection therefore we will order Keflex twice daily for 7 days  With any changes or worsening symptoms please call our clinic      Call our clinic with any changes in health condition questions    MARIUSZ Mckeon CNP  [facility]     Chart documentation with Dragon Voice recognition Software. Although reviewed after completion, some words and grammatical errors may remain.      Again, thank you for allowing me to participate in the care of your patient.        Sincerely,        MARIUSZ Mckeon CNP

## 2024-01-24 NOTE — PROGRESS NOTES
"Oncology Rooming Note    January 24, 2024 1:30 PM   Quiana Dunaway is a 88 year old female who presents for:    Chief Complaint   Patient presents with    Oncology Clinic Visit     Initial Vitals: /79   Pulse 83   Temp 98.3  F (36.8  C) (Oral)   Resp 16   Wt 63 kg (139 lb)   SpO2 95%   BMI 26.26 kg/m   Estimated body mass index is 26.26 kg/m  as calculated from the following:    Height as of 1/8/24: 1.549 m (5' 1\").    Weight as of this encounter: 63 kg (139 lb). Body surface area is 1.65 meters squared.  No Pain (0) Comment: Data Unavailable   No LMP recorded. Patient is postmenopausal.  Allergies reviewed: Yes  Medications reviewed: Yes    Medications: Medication refills not needed today.  Pharmacy name entered into [employer]:    [facility] - [place], [place] - [address]  [id number] - [place], [place] - [address] AT [facility]    Frailty Screening:   Is the patient here for a new oncology consult visit in cancer care? 2. No      Clinical concerns:   np was notified.      Mariela Lyn CMA            "

## 2024-01-24 NOTE — PROGRESS NOTES
Oncology/Hematology Visit Note  Jan 24, 2024    Reason for Visit: follow up of multiple myeloma IgG kappa high risk  Diagnosed 2017  Patient Dr. Parry  Patient is undergoing treatment with daratumumab pomalidomide and dexamethasone  -Due to the side effects Pomalyst reduced to 2 mg 2 weeks on 1 week off      Interval History:  Patient complains of some redness around the right big toe nail.  Denies pain denies fever chills sweats    Review of Systems:  14 point ROS of systems including Constitutional, Eyes, Respiratory, Cardiovascular, Gastroenterology, Genitourinary, Integumentary, Muscularskeletal, Psychiatric were all negative except for pertinent positives noted in my HPI.    Physical Examination:  Physical Exam  HENT:      Head: Normocephalic.      Right Ear: Tympanic membrane normal.      Nose: Nose normal.      Mouth/Throat:      Mouth: Mucous membranes are moist.   Eyes:      Pupils: Pupils are equal, round, and reactive to light.   Cardiovascular:      Rate and Rhythm: Normal rate.   Pulmonary:      Effort: Pulmonary effort is normal.   Abdominal:      General: Abdomen is flat.   Musculoskeletal:      Cervical back: Normal range of motion.   Skin:     General: Skin is warm.   Neurological:      General: No focal deficit present.      Mental Status: She is alert.   Psychiatric:         Mood and Affect: Mood normal.     --Redness around right big toenail, no discharge noted    LABS  -CBC reviewed    Assessment and Plan:    multiple myeloma IgG kappa high risk  Diagnosed 2017  Patient Dr. Parry  Patient is undergoing treatment with daratumumab, pomalidomide 3 mg- 3 weeks on 1 week off and 1 week off dexamethasone 4 mg once a week  -Patient is responding to treatment  Due to the side effects Pomalyst changed to to 2 mg 2 weeks on 1 week off  -Overall stable multiple myeloma labs.  -Myeloma labs from today are pending  Patient is scheduled to see Dr. 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  Neutropenic precautions .  Avoid crowded places and sick contacts Check temperature frequently in the event of fever chills sweats or any signs symptoms of infection go to ER        Anemia   Patient denies bleeding  Overall stable hemoglobin      Prophylaxis  Continue with aspirin and acyclovir    Bone health  Continue to hold Zometa until dental work-up is done    Generalized weakness  Chronic issue for the patient    Right big toe ingrown nail  -Referred to podiatry  -Patient is a is at risk for infection therefore we will order Keflex twice daily for 7 days  With any changes or worsening symptoms please call our clinic      Call our clinic with any changes in health condition questions    MARIUSZ Mckeon [facility]     Chart documentation with Dragon Voice recognition Software. Although reviewed after completion, some words and grammatical errors may remain.

## 2024-01-24 NOTE — PROGRESS NOTES
Infusion Nursing Note:  Quiana PEREZ Emelyn presents today for Darzalax Faspro.    Patient seen by provider today: Yes: Juaquin NP   present during visit today: Not Applicable.    Note: ANC 1.1- per Juaquin, okbrenton to proceed with treatment today.      Intravenous Access:  No Intravenous access/labs at this visit.    Treatment Conditions:  Lab Results   Component Value Date    HGB 11.0 (L) 01/24/2024    WBC 4.9 01/24/2024    ANEU 0.5 (L) 12/26/2023    ANEUTAUTO 1.1 (L) 01/24/2024     01/24/2024        Results reviewed, labs did NOT meet treatment parameters: okay to proceed with treatment today per Juaquin NP.      Post Infusion Assessment:  Patient tolerated injection without incident.  Site patent and intact, free from redness, edema or discomfort.       Discharge Plan:   Discharge instructions reviewed with: Patient and Family.  Patient and/or family verbalized understanding of discharge instructions and all questions answered.  AVS to patient via [employer].  Patient will return 2/20/24 for next appointment.   Patient discharged in stable condition accompanied by: granddaughter.  Departure Mode: Ambulatory with walker.      Kim Amado RN

## 2024-01-25 LAB
ALBUMIN SERPL ELPH-MCNC: 3.5 G/DL (ref 3.7–5.1)
ALPHA1 GLOB SERPL ELPH-MCNC: 0.3 G/DL (ref 0.2–0.4)
ALPHA2 GLOB SERPL ELPH-MCNC: 0.7 G/DL (ref 0.5–0.9)
B-GLOBULIN SERPL ELPH-MCNC: 0.6 G/DL (ref 0.6–1)
GAMMA GLOB SERPL ELPH-MCNC: 0.9 G/DL (ref 0.7–1.6)
IGG SERPL-MCNC: 976 MG/DL (ref 610–1616)
KAPPA LC FREE SER-MCNC: 43.19 MG/DL (ref 0.33–1.94)
KAPPA LC FREE/LAMBDA FREE SER NEPH: 127.03 {RATIO} (ref 0.26–1.65)
LAMBDA LC FREE SERPL-MCNC: 0.34 MG/DL (ref 0.57–2.63)
LOCATION OF TASK: ABNORMAL
M PROTEIN SERPL ELPH-MCNC: 0.7 G/DL
PROT PATTERN SERPL ELPH-IMP: ABNORMAL

## 2024-01-26 NOTE — RESULT ENCOUNTER NOTE
Dear Ms. Dunaway,    Blood test reveals myeloma to be stable.    Please, call me with any questions.    Alex Parry MD

## 2024-01-30 DIAGNOSIS — C90.00 MULTIPLE MYELOMA NOT HAVING ACHIEVED REMISSION (H): Primary | ICD-10-CM

## 2024-01-30 RX ORDER — DIPHENHYDRAMINE HYDROCHLORIDE 50 MG/ML
50 INJECTION INTRAMUSCULAR; INTRAVENOUS
Status: CANCELLED
Start: 2024-02-20

## 2024-01-30 RX ORDER — EPINEPHRINE 1 MG/ML
0.3 INJECTION, SOLUTION, CONCENTRATE INTRAVENOUS EVERY 5 MIN PRN
Status: CANCELLED | OUTPATIENT
Start: 2024-02-20

## 2024-01-30 RX ORDER — HEPARIN SODIUM (PORCINE) LOCK FLUSH IV SOLN 100 UNIT/ML 100 UNIT/ML
5 SOLUTION INTRAVENOUS
Status: CANCELLED | OUTPATIENT
Start: 2024-02-20

## 2024-01-30 RX ORDER — ALBUTEROL SULFATE 0.83 MG/ML
2.5 SOLUTION RESPIRATORY (INHALATION)
Status: CANCELLED | OUTPATIENT
Start: 2024-02-20

## 2024-01-30 RX ORDER — DIPHENHYDRAMINE HCL 25 MG
50 CAPSULE ORAL
Status: CANCELLED | OUTPATIENT
Start: 2024-02-20

## 2024-01-30 RX ORDER — ALBUTEROL SULFATE 90 UG/1
1-2 AEROSOL, METERED RESPIRATORY (INHALATION)
Status: CANCELLED
Start: 2024-02-20

## 2024-01-30 RX ORDER — HEPARIN SODIUM,PORCINE 10 UNIT/ML
5 VIAL (ML) INTRAVENOUS
Status: CANCELLED | OUTPATIENT
Start: 2024-02-20

## 2024-01-30 RX ORDER — LORAZEPAM 2 MG/ML
0.5 INJECTION INTRAMUSCULAR EVERY 4 HOURS PRN
Status: CANCELLED | OUTPATIENT
Start: 2024-02-20

## 2024-01-30 RX ORDER — DEXAMETHASONE 4 MG/1
12 TABLET ORAL
Status: CANCELLED | OUTPATIENT
Start: 2024-02-20

## 2024-01-30 RX ORDER — ACETAMINOPHEN 325 MG/1
650 TABLET ORAL
Status: CANCELLED | OUTPATIENT
Start: 2024-02-20

## 2024-01-30 RX ORDER — MEPERIDINE HYDROCHLORIDE 25 MG/ML
25 INJECTION INTRAMUSCULAR; INTRAVENOUS; SUBCUTANEOUS EVERY 30 MIN PRN
Status: CANCELLED | OUTPATIENT
Start: 2024-02-20

## 2024-02-02 DIAGNOSIS — C90.00 MULTIPLE MYELOMA NOT HAVING ACHIEVED REMISSION (H): Primary | ICD-10-CM

## 2024-02-07 ENCOUNTER — TELEPHONE (OUTPATIENT)
Dept: PHARMACY | Facility: CLINIC | Age: 89
End: 2024-02-07

## 2024-02-07 ENCOUNTER — OFFICE VISIT (OUTPATIENT)
Dept: FAMILY MEDICINE | Facility: CLINIC | Age: 89
End: 2024-02-07
Payer: MEDICARE

## 2024-02-07 VITALS
OXYGEN SATURATION: 98 % | WEIGHT: 135 LBS | HEIGHT: 61 IN | TEMPERATURE: 97.8 F | HEART RATE: 87 BPM | SYSTOLIC BLOOD PRESSURE: 134 MMHG | DIASTOLIC BLOOD PRESSURE: 76 MMHG | BODY MASS INDEX: 25.49 KG/M2 | RESPIRATION RATE: 18 BRPM

## 2024-02-07 DIAGNOSIS — I10 BENIGN ESSENTIAL HYPERTENSION: Primary | ICD-10-CM

## 2024-02-07 PROCEDURE — 99213 OFFICE O/P EST LOW 20 MIN: CPT | Performed by: INTERNAL MEDICINE

## 2024-02-07 ASSESSMENT — PAIN SCALES - GENERAL: PAINLEVEL: NO PAIN (0)

## 2024-02-07 NOTE — TELEPHONE ENCOUNTER
Oral Chemotherapy Monitoring Program   Medication: Pomalyst  Rx: 2mg PO daily on days 1 through 14 of 21 day cycle   Auth #:  [id number] obtained on 2/7/2024  Risk Category: adult female of non child bearing potential  Routine survey questions reviewed.   Rx to be Escribed to [facility]     Terrie Bowen [facility] Oncology Pharmacy Liaison  [phone]

## 2024-02-07 NOTE — PROGRESS NOTES
Assessment & Plan     Benign essential hypertension  Her third check blood pressure readings were well-controlled in clinic today.  Her initial elevated blood pressure reading might have had to do with not allowing for enough time to rest before checking.  Overall, she seems to be doing well in a difficult situation receiving treatment for the myeloma.  She will follow-up with her cancer doctors as directed.  She will see me back in a 6-week interval and we will plan for a TSH at that time to monitor her her hyperthyroid treatment.      No LOS data to display   Time spent by me doing chart review, history and exam, documentation and further activities per the note            FUTURE APPOINTMENTS:       -  Follow-up with whom? Me Follow-Up for what? Chronic Disease f/u Chronic Disease f/u: Hypertension How? In Person Future, Expected: 3/21/2024 ApproximateDirk is a 88 year old, presenting for the following health issues:  Follow Up    History of Present Illness       Reason for visit:  Blood pressure follow up    She eats 0-1 servings of fruits and vegetables daily.She consumes 1 sweetened beverage(s) daily.She exercises with enough effort to increase her heart rate 9 or less minutes per day.  She exercises with enough effort to increase her heart rate 3 or less days per week.   She is taking medications regularly.  88-year-old female with Multiple myeloma undergoing chemotherapy  She also has hypertension, hypothyroidism, mild anxiety, diastolic heart failure  She appreciates short-term follow-up  She has been feeling fatigue and occasional nausea and dizziness when she is receiving chemotherapy but the symptoms have not changed recently and she attributes them to her cancer treatments  She is a little concerned because her blood pressure reading was initially elevated in clinic today  Otherwise she is doing okay and she is accompanied by her daughter-in-law again today      Objective    BP (!)  "160/86 (BP Location: Left arm, Patient Position: Sitting, Cuff Size: Adult Regular)   Pulse 87   Temp 97.8  F (36.6  C) (Temporal)   Resp 18   Ht 1.549 m (5' 1\")   Wt 61.2 kg (135 lb)   SpO2 98%   BMI 25.51 kg/m    Body mass index is 25.51 kg/m .  Physical Exam   General: This is a well-appearing female in no acute distress, she appears well-nourished, she speaks in full sentences and does not appear toxic            Signed Electronically by: César Chung MD    "

## 2024-02-20 ENCOUNTER — ONCOLOGY VISIT (OUTPATIENT)
Dept: ONCOLOGY | Facility: CLINIC | Age: 89
End: 2024-02-20
Attending: INTERNAL MEDICINE
Payer: MEDICARE

## 2024-02-20 ENCOUNTER — INFUSION THERAPY VISIT (OUTPATIENT)
Dept: INFUSION THERAPY | Facility: CLINIC | Age: 89
End: 2024-02-20
Attending: INTERNAL MEDICINE
Payer: MEDICARE

## 2024-02-20 VITALS
HEART RATE: 83 BPM | SYSTOLIC BLOOD PRESSURE: 131 MMHG | WEIGHT: 138 LBS | OXYGEN SATURATION: 95 % | BODY MASS INDEX: 26.06 KG/M2 | RESPIRATION RATE: 16 BRPM | DIASTOLIC BLOOD PRESSURE: 76 MMHG | HEIGHT: 61 IN | TEMPERATURE: 98.2 F

## 2024-02-20 DIAGNOSIS — C90.00 MULTIPLE MYELOMA NOT HAVING ACHIEVED REMISSION (H): Primary | ICD-10-CM

## 2024-02-20 DIAGNOSIS — R11.0 NAUSEA: ICD-10-CM

## 2024-02-20 DIAGNOSIS — E05.90: ICD-10-CM

## 2024-02-20 LAB
ALBUMIN SERPL BCG-MCNC: 4 G/DL (ref 3.5–5.2)
ALP SERPL-CCNC: 85 U/L (ref 40–150)
ALT SERPL W P-5'-P-CCNC: 8 U/L (ref 0–50)
ANION GAP SERPL CALCULATED.3IONS-SCNC: 9 MMOL/L (ref 7–15)
AST SERPL W P-5'-P-CCNC: 13 U/L (ref 0–45)
BASOPHILS # BLD AUTO: ABNORMAL 10*3/UL
BASOPHILS # BLD MANUAL: 0 10E3/UL (ref 0–0.2)
BASOPHILS NFR BLD AUTO: ABNORMAL %
BASOPHILS NFR BLD MANUAL: 0 %
BILIRUB SERPL-MCNC: 0.6 MG/DL
BUN SERPL-MCNC: 25.4 MG/DL (ref 8–23)
CALCIUM SERPL-MCNC: 8.5 MG/DL (ref 8.8–10.2)
CHLORIDE SERPL-SCNC: 108 MMOL/L (ref 98–107)
CREAT SERPL-MCNC: 0.75 MG/DL (ref 0.51–0.95)
DEPRECATED HCO3 PLAS-SCNC: 27 MMOL/L (ref 22–29)
EGFRCR SERPLBLD CKD-EPI 2021: 77 ML/MIN/1.73M2
ELLIPTOCYTES BLD QL SMEAR: SLIGHT
EOSINOPHIL # BLD AUTO: ABNORMAL 10*3/UL
EOSINOPHIL # BLD MANUAL: 0.3 10E3/UL (ref 0–0.7)
EOSINOPHIL NFR BLD AUTO: ABNORMAL %
EOSINOPHIL NFR BLD MANUAL: 5 %
ERYTHROCYTE [DISTWIDTH] IN BLOOD BY AUTOMATED COUNT: 14.5 % (ref 10–15)
GLUCOSE SERPL-MCNC: 110 MG/DL (ref 70–99)
HCT VFR BLD AUTO: 33.4 % (ref 35–47)
HGB BLD-MCNC: 10.9 G/DL (ref 11.7–15.7)
IMM GRANULOCYTES # BLD: ABNORMAL 10*3/UL
IMM GRANULOCYTES NFR BLD: ABNORMAL %
LYMPHOCYTES # BLD AUTO: ABNORMAL 10*3/UL
LYMPHOCYTES # BLD MANUAL: 2.6 10E3/UL (ref 0.8–5.3)
LYMPHOCYTES NFR BLD AUTO: ABNORMAL %
LYMPHOCYTES NFR BLD MANUAL: 50 %
MCH RBC QN AUTO: 30.4 PG (ref 26.5–33)
MCHC RBC AUTO-ENTMCNC: 32.6 G/DL (ref 31.5–36.5)
MCV RBC AUTO: 93 FL (ref 78–100)
MONOCYTES # BLD AUTO: ABNORMAL 10*3/UL
MONOCYTES # BLD MANUAL: 0.7 10E3/UL (ref 0–1.3)
MONOCYTES NFR BLD AUTO: ABNORMAL %
MONOCYTES NFR BLD MANUAL: 13 %
NEUTROPHILS # BLD AUTO: ABNORMAL 10*3/UL
NEUTROPHILS # BLD MANUAL: 1.6 10E3/UL (ref 1.6–8.3)
NEUTROPHILS NFR BLD AUTO: ABNORMAL %
NEUTROPHILS NFR BLD MANUAL: 32 %
NRBC # BLD AUTO: 0 10E3/UL
NRBC BLD AUTO-RTO: 0 /100
PLAT MORPH BLD: ABNORMAL
PLATELET # BLD AUTO: 149 10E3/UL (ref 150–450)
POTASSIUM SERPL-SCNC: 3.7 MMOL/L (ref 3.4–5.3)
PROT SERPL-MCNC: 6.6 G/DL (ref 6.4–8.3)
RBC # BLD AUTO: 3.58 10E6/UL (ref 3.8–5.2)
RBC MORPH BLD: ABNORMAL
SODIUM SERPL-SCNC: 144 MMOL/L (ref 135–145)
TOTAL PROTEIN SERUM FOR ELP: 6.1 G/DL (ref 6.4–8.3)
WBC # BLD AUTO: 5.1 10E3/UL (ref 4–11)

## 2024-02-20 PROCEDURE — 83521 IG LIGHT CHAINS FREE EACH: CPT | Mod: 59 | Performed by: INTERNAL MEDICINE

## 2024-02-20 PROCEDURE — 85027 COMPLETE CBC AUTOMATED: CPT | Performed by: INTERNAL MEDICINE

## 2024-02-20 PROCEDURE — 85007 BL SMEAR W/DIFF WBC COUNT: CPT | Performed by: INTERNAL MEDICINE

## 2024-02-20 PROCEDURE — 250N000011 HC RX IP 250 OP 636: Mod: JZ | Performed by: INTERNAL MEDICINE

## 2024-02-20 PROCEDURE — G0463 HOSPITAL OUTPT CLINIC VISIT: HCPCS | Mod: 25 | Performed by: INTERNAL MEDICINE

## 2024-02-20 PROCEDURE — 80053 COMPREHEN METABOLIC PANEL: CPT | Performed by: INTERNAL MEDICINE

## 2024-02-20 PROCEDURE — 84155 ASSAY OF PROTEIN SERUM: CPT | Performed by: INTERNAL MEDICINE

## 2024-02-20 PROCEDURE — 96401 CHEMO ANTI-NEOPL SQ/IM: CPT

## 2024-02-20 PROCEDURE — 84165 PROTEIN E-PHORESIS SERUM: CPT | Mod: 26 | Performed by: PATHOLOGY

## 2024-02-20 PROCEDURE — 84165 PROTEIN E-PHORESIS SERUM: CPT | Mod: TC | Performed by: PATHOLOGY

## 2024-02-20 PROCEDURE — 99215 OFFICE O/P EST HI 40 MIN: CPT | Performed by: INTERNAL MEDICINE

## 2024-02-20 PROCEDURE — 82784 ASSAY IGA/IGD/IGG/IGM EACH: CPT | Performed by: INTERNAL MEDICINE

## 2024-02-20 PROCEDURE — 36415 COLL VENOUS BLD VENIPUNCTURE: CPT | Performed by: INTERNAL MEDICINE

## 2024-02-20 RX ORDER — METHIMAZOLE 5 MG/1
2.5 TABLET ORAL DAILY
Qty: 45 TABLET | Refills: 3 | Status: SHIPPED | OUTPATIENT
Start: 2024-02-20

## 2024-02-20 RX ORDER — EPINEPHRINE 1 MG/ML
0.3 INJECTION, SOLUTION INTRAMUSCULAR; SUBCUTANEOUS EVERY 5 MIN PRN
Status: CANCELLED | OUTPATIENT
Start: 2024-04-02

## 2024-02-20 RX ORDER — LORAZEPAM 2 MG/ML
0.5 INJECTION INTRAMUSCULAR EVERY 4 HOURS PRN
Status: CANCELLED | OUTPATIENT
Start: 2024-04-02

## 2024-02-20 RX ORDER — DEXAMETHASONE 4 MG/1
12 TABLET ORAL
Status: CANCELLED | OUTPATIENT
Start: 2024-04-02

## 2024-02-20 RX ORDER — FAMOTIDINE 40 MG/1
40 TABLET, FILM COATED ORAL 2 TIMES DAILY
Qty: 180 TABLET | Refills: 3 | Status: SHIPPED | OUTPATIENT
Start: 2024-02-20

## 2024-02-20 RX ORDER — DIPHENHYDRAMINE HCL 25 MG
50 CAPSULE ORAL
Status: CANCELLED | OUTPATIENT
Start: 2024-04-02

## 2024-02-20 RX ORDER — HEPARIN SODIUM (PORCINE) LOCK FLUSH IV SOLN 100 UNIT/ML 100 UNIT/ML
5 SOLUTION INTRAVENOUS
Status: CANCELLED | OUTPATIENT
Start: 2024-04-02

## 2024-02-20 RX ORDER — MEPERIDINE HYDROCHLORIDE 25 MG/ML
25 INJECTION INTRAMUSCULAR; INTRAVENOUS; SUBCUTANEOUS EVERY 30 MIN PRN
Status: CANCELLED | OUTPATIENT
Start: 2024-04-02

## 2024-02-20 RX ORDER — METHYLPREDNISOLONE SODIUM SUCCINATE 125 MG/2ML
125 INJECTION, POWDER, LYOPHILIZED, FOR SOLUTION INTRAMUSCULAR; INTRAVENOUS
Status: CANCELLED
Start: 2024-04-02

## 2024-02-20 RX ORDER — ALBUTEROL SULFATE 0.83 MG/ML
2.5 SOLUTION RESPIRATORY (INHALATION)
Status: CANCELLED | OUTPATIENT
Start: 2024-04-02

## 2024-02-20 RX ORDER — HEPARIN SODIUM,PORCINE 10 UNIT/ML
5 VIAL (ML) INTRAVENOUS
Status: CANCELLED | OUTPATIENT
Start: 2024-04-02

## 2024-02-20 RX ORDER — DIPHENHYDRAMINE HYDROCHLORIDE 50 MG/ML
50 INJECTION INTRAMUSCULAR; INTRAVENOUS
Status: CANCELLED
Start: 2024-04-02

## 2024-02-20 RX ORDER — ALBUTEROL SULFATE 90 UG/1
1-2 AEROSOL, METERED RESPIRATORY (INHALATION)
Status: CANCELLED
Start: 2024-04-02

## 2024-02-20 RX ORDER — ACETAMINOPHEN 325 MG/1
650 TABLET ORAL
Status: CANCELLED | OUTPATIENT
Start: 2024-04-02

## 2024-02-20 RX ADMIN — DARATUMUMAB AND HYALURONIDASE-FIHJ (HUMAN RECOMBINANT) 1800 MG: 1800; 30000 INJECTION SUBCUTANEOUS at 11:12

## 2024-02-20 ASSESSMENT — PAIN SCALES - GENERAL: PAINLEVEL: NO PAIN (0)

## 2024-02-20 NOTE — LETTER
"    2/20/2024         RE: Quiana Dunaway  [address] [facility] [address]        Dear Colleague,    Thank you for referring your patient, Quiana Dunaway, to the [facility]. Please see a copy of my visit note below.    Oncology Rooming Note    February 20, 2024 10:06 AM   Quiana Dunaway is a 88 year old female who presents for:    Chief Complaint   Patient presents with     Oncology Clinic Visit     Initial Vitals: /76   Resp 16   Ht 1.549 m (5' 1\")   Wt 62.6 kg (138 lb)   BMI 26.07 kg/m   Estimated body mass index is 26.07 kg/m  as calculated from the following:    Height as of this encounter: 1.549 m (5' 1\").    Weight as of this encounter: 62.6 kg (138 lb). Body surface area is 1.64 meters squared.  No Pain (0) Comment: Data Unavailable   No LMP recorded. Patient is postmenopausal.  Allergies reviewed: Yes  Medications reviewed: Yes    Medications: Medication refills not needed today.  Pharmacy name entered into [facility]:    [facility] - [place], [place] - [address]  [id number] - [place], [place] - [address] AT [facility]    Frailty Screening:   Is the patient here for a new oncology consult visit in cancer care? 2. No          Jacquie Mcdaniel MA              HEMATOLOGY HISTORY: Mrs. Dunaway is a lady with multiple myeloma (IgG kappa). High risk, t(14;16).  1. On 05/01/2017, WBC of 3.4, hemoglobin of 10.2 and platelet of 154.  2. SPEP on 07/07/2017 revealed M-spike of 1.8.  3. Bone marrow biopsy on 07/12/2017 reveals kappa monotypic plasma cell population consistent with multiple myeloma.  Bone marrow is 70% cellular.  Plasma cell is 50-60%.     -There is gain of chromosome 1, 5, 9, 15, and 17.  There is translocation 14;16.   4.  On 07/18/2017:  -M-spike of 1.9.   -Immunofixation reveals monoclonal IgG kappa.    -IgG level of 2970.  IgA of 15 and IgM of 175.    -Lomira free light chain " of 67.7.  Lambda free light chain of 1.42.  Ratio of kappa to lambda of 47.71.    -Beta 2 microglobulin of 3.4.   5. PET scan on 07/24/2017 reveals several focal areas of increased FDG uptake consistent with multiple myeloma.  There is probable epithelial cyst in tail of the pancreas.  No associated abnormal FDG activity.  Left thyroid cysts or nodules without any FDG activity.  There is a 0.3 cm left upper lobe lung nodule.   6.  Velcade, Revlimid and dexamethasone between on 08/14/2017 and 04/30/2018. Velcade stopped.   -Revlimid and dexamethasone continued.  -Revlimid held between 12/27/2021 and 03/08/2022.  -Daratumumab added on 11/14/2022.  -Changed to daratumumab, pomalidomide and dexamethasone on 06/12/2023.  7. CT chest, abdomen, and pelvis on 12/30/2021 does not reveal any malignancy.  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     SUBJECTIVE:  Ms. Dunaway is an 88-year-old female with multiple myeloma on daratumumab, pomalidomide and dexamethasone.       Zometa has been on hold for dental work.     She has generalized weakness secondary to myeloma, her age and medications. Her fatigue gets worse after she starts pomalidomide. She has arthritic joint pain.  No worsening of pain.      She has chronic dizziness.  No worsening of dizziness. No headache.  No chest pain.  No shortness of breath.  No abdominal pain. Has some nausea. No vomiting.  No urinary complaint.  She gets intermittent diarrhea.  No bleeding.  No fever or night sweats.  All other review of system is negative.      PHYSICAL EXAMINATION:   GENERAL:  Alert and oriented x 3.  Not in distress.  VITAL SIGNS:  Reviewed.       EYES:  No icterus.   NECK:  Supple. No lymphadenopathy. No thyromegaly.   AXILLAE:  No lymphadenopathy.   LUNGS:  Good air entry bilaterally.  No crackles or wheezing.   HEART:  Regular.  No murmur.   GI: Abdomen is soft.  Nontender. No mass. Difficult palpation because of her  weight.  EXTREMITIES:  No pedal edema.  No calf swelling or tenderness.   SKIN:  No rash.     LABORATORY: CBC, CMP, SPEP, IgG and free light chain reviewed.      ASSESSMENT:    1.  An 88-year-old female with multiple myeloma on daratumumab, pomalidomide and dexamethasone.  Myeloma is stable.  2.  Mild normocytic anemia from myeloma and its treatment. Anemia is stable.  3.  Chronic dizziness.  4.  Generalized weakness from myeloma, anemia, medication and old age.  5.  Arthritic joint pain.   6.  Mild thrombocytopenia.     PLAN:  Continue pomalidomid, daratumumab and dexamethasone.  Take lisinopril 5 mg a day.  See NP in 4 weeks.  See me in 8 weeks     DISCUSSION:  1.  Patient is overall doing well.  Myeloma is stable.  She will continue on current treatment with daratumumab, pomalidomide and dexamethasone.  Overall she is tolerating it well.    2.  Labs were reviewed with her.  There are minor abnormalities.  Labs are good for treatment.    3.  Will continue to hold bisphosphonate pending dental workup.    4.  She and her daughter had a few questions all answered.  I will see her in 2 months time.  In between she will see our ROSA MARIA.     TOTAL VISIT TIME: 40 minutes.  Time spent in today's visit, review of chart/investigations today, monitoring for toxicity of high risk medications and documentation today.      Again, thank you for allowing me to participate in the care of your patient.        Sincerely,        Alex Parry MD

## 2024-02-20 NOTE — PROGRESS NOTES
HEMATOLOGY HISTORY: Mrs. Dunaway is a lady with multiple myeloma (IgG kappa). High risk, t(14;16).  1. On 05/01/2017, WBC of 3.4, hemoglobin of 10.2 and platelet of 154.  2. SPEP on 07/07/2017 revealed M-spike of 1.8.  3. Bone marrow biopsy on 07/12/2017 reveals kappa monotypic plasma cell population consistent with multiple myeloma.  Bone marrow is 70% cellular.  Plasma cell is 50-60%.     -There is gain of chromosome 1, 5, 9, 15, and 17.  There is translocation 14;16.   4.  On 07/18/2017:  -M-spike of 1.9.   -Immunofixation reveals monoclonal IgG kappa.    -IgG level of 2970.  IgA of 15 and IgM of 175.    -Kappa free light chain of 67.7.  Lambda free light chain of 1.42.  Ratio of kappa to lambda of 47.71.    -Beta 2 microglobulin of 3.4.   5. PET scan on 07/24/2017 reveals several focal areas of increased FDG uptake consistent with multiple myeloma.  There is probable epithelial cyst in tail of the pancreas.  No associated abnormal FDG activity.  Left thyroid cysts or nodules without any FDG activity.  There is a 0.3 cm left upper lobe lung nodule.   6.  Velcade, Revlimid and dexamethasone between on 08/14/2017 and 04/30/2018. Velcade stopped.   -Revlimid and dexamethasone continued.  -Revlimid held between 12/27/2021 and 03/08/2022.  -Daratumumab added on 11/14/2022.  -Changed to daratumumab, pomalidomide and dexamethasone on 06/12/2023.  7. CT chest, abdomen, and pelvis on 12/30/2021 does not reveal any malignancy.  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     SUBJECTIVE:  Ms. Dunaway is an 88-year-old female with multiple myeloma on daratumumab, pomalidomide and dexamethasone.       Zometa has been on hold for dental work.     She has generalized weakness secondary to myeloma, her age and medications. Her fatigue gets worse after she starts pomalidomide. She has arthritic joint pain.  No worsening of pain.      She has chronic dizziness.  No worsening of dizziness. No  headache.  No chest pain.  No shortness of breath.  No abdominal pain. Has some nausea. No vomiting.  No urinary complaint.  She gets intermittent diarrhea.  No bleeding.  No fever or night sweats.  All other review of system is negative.      PHYSICAL EXAMINATION:   GENERAL:  Alert and oriented x 3.  Not in distress.  VITAL SIGNS:  Reviewed.       EYES:  No icterus.   NECK:  Supple. No lymphadenopathy. No thyromegaly.   AXILLAE:  No lymphadenopathy.   LUNGS:  Good air entry bilaterally.  No crackles or wheezing.   HEART:  Regular.  No murmur.   GI: Abdomen is soft.  Nontender. No mass. Difficult palpation because of her weight.  EXTREMITIES:  No pedal edema.  No calf swelling or tenderness.   SKIN:  No rash.     LABORATORY: CBC, CMP, SPEP, IgG and free light chain reviewed.      ASSESSMENT:    1.  An 88-year-old female with multiple myeloma on daratumumab, pomalidomide and dexamethasone.  Myeloma is stable.  2.  Mild normocytic anemia from myeloma and its treatment. Anemia is stable.  3.  Chronic dizziness.  4.  Generalized weakness from myeloma, anemia, medication and old age.  5.  Arthritic joint pain.   6.  Mild thrombocytopenia.     PLAN:  Continue pomalidomid, daratumumab and dexamethasone.  Take lisinopril 5 mg a day.  See NP in 4 weeks.  See me in 8 weeks     DISCUSSION:  1.  Patient is overall doing well.  Myeloma is stable.  She will continue on current treatment with daratumumab, pomalidomide and dexamethasone.  Overall she is tolerating it well.    2.  Labs were reviewed with her.  There are minor abnormalities.  Labs are good for treatment.    3.  Will continue to hold bisphosphonate pending dental workup.    4.  She and her daughter had a few questions all answered.  I will see her in 2 months time.  In between she will see our ROSA MARIA.     TOTAL VISIT TIME: 40 minutes.  Time spent in today's visit, review of chart/investigations today, monitoring for toxicity of high risk medications and documentation  today.

## 2024-02-20 NOTE — PROGRESS NOTES
Medical Assistant Note:  Quiana Dunaway presents today for blood draw.    Patient seen by provider today: Yes: nakita.   present during visit today: Not Applicable.    Concerns: No Concerns.    Procedure:  Lab draw site: rac, Needle type: bf, Gauge: 23.    Post Assessment:  Labs drawn without difficulty: Yes.    Discharge Plan:  Departure Mode: Ambulatory.    Face to Face Time: 5 min.    Mariela Lyn, CMA

## 2024-02-20 NOTE — PROGRESS NOTES
"Oncology Rooming Note    February 20, 2024 10:06 AM   Quiana Dunaway is a 88 year old female who presents for:    Chief Complaint   Patient presents with    Oncology Clinic Visit     Initial Vitals: /76   Resp 16   Ht 1.549 m (5' 1\")   Wt 62.6 kg (138 lb)   BMI 26.07 kg/m   Estimated body mass index is 26.07 kg/m  as calculated from the following:    Height as of this encounter: 1.549 m (5' 1\").    Weight as of this encounter: 62.6 kg (138 lb). Body surface area is 1.64 meters squared.  No Pain (0) Comment: Data Unavailable   No LMP recorded. Patient is postmenopausal.  Allergies reviewed: Yes  Medications reviewed: Yes    Medications: Medication refills not needed today.  Pharmacy name entered into [facility]:    [facility] - [place], [place] - [address] [id number] - [place], [place] - [address] AT [facility]    Frailty Screening:   Is the patient here for a new oncology consult visit in cancer care? 2. No          Jacquie Mcdaniel MA            "

## 2024-02-20 NOTE — PROGRESS NOTES
Infusion Nursing Note:  Quiana Dunaway presents today for C17D1 Darzalex Faspro.    Patient seen by provider today: Yes: Dr Parry   present during visit today: Not Applicable.    Note: N/A.      Intravenous Access:  No Intravenous access/labs at this visit.    Treatment Conditions:  Lab Results   Component Value Date    HGB 10.9 (L) 02/20/2024    WBC 5.1 02/20/2024    ANEU 1.6 02/20/2024    ANEUTAUTO 1.1 (L) 01/24/2024     (L) 02/20/2024        Results reviewed, labs MET treatment parameters, ok to proceed with treatment.      Post Infusion Assessment:  Patient tolerated injection without incident.       Discharge Plan:   Discharge instructions reviewed with: Patient and Family.  Patient and/or family verbalized understanding of discharge instructions and all questions answered.  Patient discharged in stable condition accompanied by: daughter-in-law.  Departure Mode: Ambulatory with walker.      Tara Davis RN

## 2024-02-21 LAB
ALBUMIN SERPL ELPH-MCNC: 3.7 G/DL (ref 3.7–5.1)
ALPHA1 GLOB SERPL ELPH-MCNC: 0.3 G/DL (ref 0.2–0.4)
ALPHA2 GLOB SERPL ELPH-MCNC: 0.7 G/DL (ref 0.5–0.9)
B-GLOBULIN SERPL ELPH-MCNC: 0.6 G/DL (ref 0.6–1)
GAMMA GLOB SERPL ELPH-MCNC: 0.9 G/DL (ref 0.7–1.6)
IGG SERPL-MCNC: 998 MG/DL (ref 610–1616)
KAPPA LC FREE SER-MCNC: 42.77 MG/DL (ref 0.33–1.94)
KAPPA LC FREE/LAMBDA FREE SER NEPH: 122.2 {RATIO} (ref 0.26–1.65)
LAMBDA LC FREE SERPL-MCNC: 0.35 MG/DL (ref 0.57–2.63)
LOCATION OF TASK: ABNORMAL
M PROTEIN SERPL ELPH-MCNC: 0.7 G/DL
PROT PATTERN SERPL ELPH-IMP: ABNORMAL

## 2024-02-22 RX ORDER — DEXAMETHASONE 4 MG/1
4 TABLET ORAL WEEKLY
Qty: 16 TABLET | Refills: 2 | Status: SHIPPED | OUTPATIENT
Start: 2024-03-03 | End: 2024-09-12

## 2024-02-28 ENCOUNTER — TELEPHONE (OUTPATIENT)
Dept: PHARMACY | Facility: CLINIC | Age: 89
End: 2024-02-28
Payer: MEDICARE

## 2024-02-28 NOTE — TELEPHONE ENCOUNTER
Oral Chemotherapy Monitoring Program   Medication: Pomalyst  Rx: 2mg PO daily on days 1 through 14 of 21 day cycle   Auth #:  [id number] obtained on 2/28/2024  Risk Category: adult female of non child bearing potential  Routine survey questions reviewed.   Rx to be Escribed to [facility]     Terrie Bowen [facility] Oncology Pharmacy Liaison  [phone]

## 2024-03-05 DIAGNOSIS — C90.00 MULTIPLE MYELOMA NOT HAVING ACHIEVED REMISSION (H): ICD-10-CM

## 2024-03-05 RX ORDER — ACYCLOVIR 400 MG/1
400 TABLET ORAL 2 TIMES DAILY
Qty: 180 TABLET | Refills: 3 | Status: SHIPPED | OUTPATIENT
Start: 2024-03-05

## 2024-03-11 DIAGNOSIS — C90.00 MULTIPLE MYELOMA NOT HAVING ACHIEVED REMISSION (H): Primary | ICD-10-CM

## 2024-03-13 ENCOUNTER — ANCILLARY PROCEDURE (OUTPATIENT)
Dept: GENERAL RADIOLOGY | Facility: CLINIC | Age: 89
End: 2024-03-13
Attending: INTERNAL MEDICINE
Payer: MEDICARE

## 2024-03-13 ENCOUNTER — OFFICE VISIT (OUTPATIENT)
Dept: FAMILY MEDICINE | Facility: CLINIC | Age: 89
End: 2024-03-13
Payer: MEDICARE

## 2024-03-13 VITALS
SYSTOLIC BLOOD PRESSURE: 111 MMHG | TEMPERATURE: 98 F | RESPIRATION RATE: 16 BRPM | BODY MASS INDEX: 26.06 KG/M2 | DIASTOLIC BLOOD PRESSURE: 73 MMHG | WEIGHT: 138 LBS | HEIGHT: 61 IN | OXYGEN SATURATION: 92 % | HEART RATE: 86 BPM

## 2024-03-13 DIAGNOSIS — U07.1 INFECTION DUE TO 2019 NOVEL CORONAVIRUS: ICD-10-CM

## 2024-03-13 DIAGNOSIS — J06.9 UPPER RESPIRATORY TRACT INFECTION, UNSPECIFIED TYPE: ICD-10-CM

## 2024-03-13 DIAGNOSIS — R05.9 COUGH, UNSPECIFIED TYPE: Primary | ICD-10-CM

## 2024-03-13 DIAGNOSIS — E05.20 TOXIC MULTINODUL GOITER: ICD-10-CM

## 2024-03-13 DIAGNOSIS — R05.9 COUGH, UNSPECIFIED TYPE: ICD-10-CM

## 2024-03-13 LAB
FLUAV RNA SPEC QL NAA+PROBE: NEGATIVE
FLUBV RNA RESP QL NAA+PROBE: NEGATIVE
RSV RNA SPEC NAA+PROBE: NEGATIVE
SARS-COV-2 RNA RESP QL NAA+PROBE: POSITIVE

## 2024-03-13 PROCEDURE — 87637 SARSCOV2&INF A&B&RSV AMP PRB: CPT | Performed by: INTERNAL MEDICINE

## 2024-03-13 PROCEDURE — 71046 X-RAY EXAM CHEST 2 VIEWS: CPT | Mod: TC | Performed by: INTERNAL MEDICINE

## 2024-03-13 PROCEDURE — 99214 OFFICE O/P EST MOD 30 MIN: CPT | Performed by: INTERNAL MEDICINE

## 2024-03-13 ASSESSMENT — PAIN SCALES - GENERAL: PAINLEVEL: NO PAIN (0)

## 2024-03-13 NOTE — LETTER
March 13, 2024      Quianaviri Dunaway  [address] [facility] [address]        Dear ,    The following letter pertains to your most recent diagnostic tests:     Good news! There is no pneumonia identified on this chest x-ray.        Resulted Orders   XR Chest 2 Views    Narrative    CHEST TWO VIEWS 3/13/2024 1:44 PM     HISTORY: Cough, unspecified type    COMPARISON: 9/28/2023.       Impression    IMPRESSION: Cardiac silhouette is within normal limits. Left  subclavian approach pacemaker are in stable position. No focal  airspace consolidation. No pleural effusion. No discernible  pneumothorax. Degenerative changes of the spine.    UMBERTO BREWER MD         SYSTEM ID:  [id number]       If you have any questions or concerns, please call the clinic at the number listed above.       Sincerely,      César Chung MD/SHRAVAN TINEO

## 2024-03-13 NOTE — PROGRESS NOTES
"  Assessment & Plan     Cough, unspecified type  Check x-ray to make sure she does not have pneumonia  - XR Chest 2 Views; Future    Toxic multinodul goiter  Recheck thyroid function test with next lab draw at oncology clinic  - TSH with free T4 reflex; Future    Upper respiratory tract infection, unspecified type  Check for viral etiologies of recent URI symptoms, consider antiviral therapy if COVID or flu test are positive  - Symptomatic Influenza A/B, RSV, & SARS-CoV2 PCR (COVID-19); Future      No LOS data to display   Time spent by me doing chart review, history and exam, documentation and further activities per the note          FUTURE APPOINTMENTS:       -  Follow-up with whom? Me Follow-Up for what? Chronic Disease f/u Chronic Disease f/u: Hypertension How? In Person Future, Expected: 5/1/2024 Approximate, Patient instructed to return to clinic or contact us sooner if symptoms worsen or new symptoms develop.     Dirk Araya is a 88 year old, presenting for the following health issues:  URI    URI    History of Present Illness       Hypertension: She presents for follow up of hypertension.  She does check blood pressure  regularly outside of the clinic. Outside blood pressures have been over 140/90. She follows a low salt diet.     She eats 0-1 servings of fruits and vegetables daily.She consumes 1 sweetened beverage(s) daily.She exercises with enough effort to increase her heart rate 9 or less minutes per day.  She exercises with enough effort to increase her heart rate 3 or less days per week.   She is taking medications regularly.       Cough, nasal congestion, rhinorrhea onset yesterday without fevers or dyspnea  On chemotherapy for multiple myeloma  And methimazole for toxic multinodular goiter              Objective    /73 (BP Location: Right arm, Patient Position: Sitting, Cuff Size: Adult Regular)   Pulse 86   Temp 98  F (36.7  C) (Oral)   Resp 16   Ht 1.549 m (5' 1\")   Wt 62.6 kg " (138 lb)   LMP  (LMP Unknown)   SpO2 92%   Breastfeeding No   BMI 26.07 kg/m    Body mass index is 26.07 kg/m .  Physical Exam   General: This is a well-appearing but thin and frail elderly female.  She does not appear toxic and speaks in full sentences and often jokes with the examiner.  HEENT: The bilateral tympanic membranes are normal, the nasal exam is normal, the posterior pharynx appears normal.  The neck is supple without adenopathy.  Cardiovascular: The heart has a regular rate and rhythm.  Pulmonary: The lungs are clear to auscultation bilaterally, breathing is not labored.  Extremities: No new edema.  Mental state: Appropriate mood and affect, alert and oriented to person place and time, well-groomed.    Chest x-ray pending, flu, COVID, RSV swab pending        Signed Electronically by: César Chung MD

## 2024-03-14 ENCOUNTER — TELEPHONE (OUTPATIENT)
Dept: NURSING | Facility: CLINIC | Age: 89
End: 2024-03-14
Payer: MEDICARE

## 2024-03-14 ENCOUNTER — TELEPHONE (OUTPATIENT)
Dept: FAMILY MEDICINE | Facility: CLINIC | Age: 89
End: 2024-03-14
Payer: MEDICARE

## 2024-03-14 NOTE — RESULT ENCOUNTER NOTE
The following letter pertains to your most recent diagnostic tests:    As I discussed by phone, the COVID test is positive.  COVID is the cause of your illness.  Start the molnupiravir which is an antiviral medication that does not interact poorly with your myeloma and other medication that can help prevent progression of COVID illness.  Self isolate for 5 days since the onset of your symptoms.  Wear a good mask in public for 5 days after that.  Call if symptoms worsen or new symptoms develop.      Sincerely,    Dr. Chung

## 2024-03-14 NOTE — TELEPHONE ENCOUNTER
Coronavirus (COVID-19) Notification    Caller Name (Patient, parent, daughter/son, grandparent, etc)  DAUGHTER IN LAW BETZY CASTILLO    Reason for call  Notify of Positive Coronavirus (COVID-19) lab results, assess symptoms,  review [facility] recommendations    Lab Result    Lab test:  2019-nCoV rRt-PCR or SARS-CoV-2 PCR    Oropharyngeal AND/OR nasopharyngeal swabs is POSITIVE for 2019-nCoV RNA/SARS-COV-2 PCR (COVID-19 virus)      Gather patient reported symptoms   Assessment   Current Symptoms at time of phone call, reported by patient: (if no symptoms, document: No symptoms] COUGHING AND COLD   Date of symptom(s) onset (if applicable) 03/12/2024     If at time of call, Patients symptoms have worsened, the Patient should contact 911 or have someone drive them to Emergency Dept promptly:    If Patient calling 911, inform 911 personal that you have tested positive for the Coronavirus (COVID-19).  Place mask on and await 911 to arrive.  If Emergency Dept, If possible, please have another adult drive you to the Emergency Dept but you need to wear mask when in contact with other people.      Treatment Options:   Is patient interested in discussing COVID treatment? No.        Review information with Patient    Your result was positive. This means you have COVID-19 (coronavirus).    How can I protect others?    These guidelines are for isolating before returning to work, school or .    If you DO have symptoms  Stay home and away from others   For at least 5 days after your symptoms started, AND  You are fever free for 24 hours (with no medicine that reduces fever), AND  Your other symptoms are better  Wear a mask for 10 full days anytime you are around others    If you DON'T have symptoms  Stay home and away from others for at least 5 days after your positive test  Wear a mask for 10 full days anytime you are around others    There may be different guidelines for healthcare facilities.  Please check with  the specific sites before arriving.    If you have been told by a doctor that you were severely ill with COVID-19 or are immunocompromised, you should isolate for at least 10 days.    You should not go back to work until you meet the guidelines above for ending your home isolation. You don't need to be retested for COVID-19 before going back to work--studies show that you won't spread the virus if it's been at least 10 days since your symptoms started (or 20 days, if you have a weak immune system).    Employers, schools, and daycares: This is an official notice for this person's medical guidelines for returning in-person.  They must meet the above guidelines before going back to work, school or  in person.    You will receive a positive COVID-19 letter via [employer] or the mail soon with additional self-care information.    Would you like me to review some of that information with you now?  Yes    How can I take care of myself?    Get lots of rest. Drink extra fluids (unless a doctor has told you not to).    Take Tylenol (acetaminophen) for fever or pain. If you have liver or kidney problems, ask your family doctor if it's okay to take Tylenol.     Take either:   650 mg (two 325 mg pills) every 4 to 6 hours, or   1,000 mg (two 500 mg pills) every 8 hours as needed.   Note: Do not take more than 3,000 mg in one day. Acetaminophen is found in many medicines (both prescribed and over-the-counter medicines). Read all labels to be sure you don't take too much.    For children, check the Tylenol bottle for the right dose (based on their age or weight).    If you have other health problems (like cancer, heart failure, an organ transplant or severe kidney disease): Call your specialty clinic if you don't feel better in the next 2 days.    Know when to call 911: Emergency warning signs include:  Trouble breathing or shortness of breath  Pain or pressure in the chest that doesn't go away  Feeling confused like you  haven't felt before, or not being able to wake up  Bluish-colored lips or face      If you were tested for an upcoming procedure, please contact your provider for next steps.    Loida Nguyen

## 2024-03-14 NOTE — TELEPHONE ENCOUNTER
Reason for Call:  Appointment Request    Patient requesting this type of appt: Chronic Diease Management/Medication/Follow-Up    Requested provider: César Chung    Reason patient unable to be scheduled: Not within requested timeframe    When does patient want to be seen/preferred time:  May    Comments: Daughter in Law Deluna is calling to make the follow up appointment in May, they would like a afternoon appointment. There is a opening on 5/28 but it is in morning and then next opening is September.    Could we send this information to you in [employer] or would you prefer to receive a phone call?:   Patient would prefer a phone call   Okay to leave a detailed message?: Yes at Other phone number:  [phone]      Davidson Coyne   [facility]  Central Scheduler  Call taken on 3/14/2024 at 8:07 AM by DAVIDSON COYNE

## 2024-03-19 ENCOUNTER — LAB (OUTPATIENT)
Dept: INFUSION THERAPY | Facility: CLINIC | Age: 89
End: 2024-03-19
Attending: INTERNAL MEDICINE
Payer: MEDICARE

## 2024-03-19 ENCOUNTER — ONCOLOGY VISIT (OUTPATIENT)
Dept: ONCOLOGY | Facility: CLINIC | Age: 89
End: 2024-03-19
Attending: INTERNAL MEDICINE
Payer: MEDICARE

## 2024-03-19 VITALS
TEMPERATURE: 98.2 F | OXYGEN SATURATION: 95 % | SYSTOLIC BLOOD PRESSURE: 135 MMHG | RESPIRATION RATE: 16 BRPM | BODY MASS INDEX: 26.64 KG/M2 | DIASTOLIC BLOOD PRESSURE: 64 MMHG | WEIGHT: 141 LBS | HEART RATE: 70 BPM

## 2024-03-19 DIAGNOSIS — C90.00 MULTIPLE MYELOMA NOT HAVING ACHIEVED REMISSION (H): Primary | ICD-10-CM

## 2024-03-19 LAB
BASOPHILS # BLD AUTO: 0 10E3/UL (ref 0–0.2)
BASOPHILS NFR BLD AUTO: 1 %
EOSINOPHIL # BLD AUTO: 0.3 10E3/UL (ref 0–0.7)
EOSINOPHIL NFR BLD AUTO: 4 %
ERYTHROCYTE [DISTWIDTH] IN BLOOD BY AUTOMATED COUNT: 14.3 % (ref 10–15)
HCT VFR BLD AUTO: 34 % (ref 35–47)
HGB BLD-MCNC: 11.1 G/DL (ref 11.7–15.7)
IMM GRANULOCYTES # BLD: 0 10E3/UL
IMM GRANULOCYTES NFR BLD: 0 %
LYMPHOCYTES # BLD AUTO: 1.8 10E3/UL (ref 0.8–5.3)
LYMPHOCYTES NFR BLD AUTO: 29 %
MCH RBC QN AUTO: 30.6 PG (ref 26.5–33)
MCHC RBC AUTO-ENTMCNC: 32.6 G/DL (ref 31.5–36.5)
MCV RBC AUTO: 94 FL (ref 78–100)
MONOCYTES # BLD AUTO: 0.7 10E3/UL (ref 0–1.3)
MONOCYTES NFR BLD AUTO: 12 %
NEUTROPHILS # BLD AUTO: 3.2 10E3/UL (ref 1.6–8.3)
NEUTROPHILS NFR BLD AUTO: 54 %
NRBC # BLD AUTO: 0 10E3/UL
NRBC BLD AUTO-RTO: 0 /100
PLATELET # BLD AUTO: 189 10E3/UL (ref 150–450)
RBC # BLD AUTO: 3.63 10E6/UL (ref 3.8–5.2)
WBC # BLD AUTO: 6 10E3/UL (ref 4–11)

## 2024-03-19 PROCEDURE — G0463 HOSPITAL OUTPT CLINIC VISIT: HCPCS | Performed by: NURSE PRACTITIONER

## 2024-03-19 PROCEDURE — 85025 COMPLETE CBC W/AUTO DIFF WBC: CPT | Performed by: INTERNAL MEDICINE

## 2024-03-19 PROCEDURE — 84165 PROTEIN E-PHORESIS SERUM: CPT | Mod: 26 | Performed by: STUDENT IN AN ORGANIZED HEALTH CARE EDUCATION/TRAINING PROGRAM

## 2024-03-19 PROCEDURE — 84165 PROTEIN E-PHORESIS SERUM: CPT | Mod: TC | Performed by: STUDENT IN AN ORGANIZED HEALTH CARE EDUCATION/TRAINING PROGRAM

## 2024-03-19 PROCEDURE — 83521 IG LIGHT CHAINS FREE EACH: CPT | Performed by: INTERNAL MEDICINE

## 2024-03-19 PROCEDURE — 82784 ASSAY IGA/IGD/IGG/IGM EACH: CPT | Performed by: INTERNAL MEDICINE

## 2024-03-19 PROCEDURE — 84155 ASSAY OF PROTEIN SERUM: CPT | Performed by: INTERNAL MEDICINE

## 2024-03-19 PROCEDURE — 36415 COLL VENOUS BLD VENIPUNCTURE: CPT | Performed by: INTERNAL MEDICINE

## 2024-03-19 PROCEDURE — 99213 OFFICE O/P EST LOW 20 MIN: CPT | Performed by: NURSE PRACTITIONER

## 2024-03-19 ASSESSMENT — PAIN SCALES - GENERAL: PAINLEVEL: NO PAIN (0)

## 2024-03-19 NOTE — PROGRESS NOTES
Medical Assistant Note:  Quiana Dunaway presents today for blood draw.    Patient seen by provider today: Yes: marco a.   present during visit today: Not Applicable.    Concerns: No Concerns.    Procedure:  Lab draw site: lac, Needle type: bf, Gauge: 23.    Post Assessment:  Labs drawn without difficulty: Yes.    Discharge Plan:  Departure Mode: Ambulatory.    Face to Face Time: 5 min.    Mariela Lyn, CMA

## 2024-03-19 NOTE — LETTER
"    3/19/2024         RE: Quiana Dunaway  [address] [facility] [address]        Dear Colleague,    Thank you for referring your patient, Quiana Dunaway, to the [facility]. Please see a copy of my visit note below.    Oncology Rooming Note    March 19, 2024 11:50 AM   Quiana Dunaway is a 88 year old female who presents for:    Chief Complaint   Patient presents with     Oncology Clinic Visit     Initial Vitals: /64   Pulse 70   Temp 98.2  F (36.8  C) (Oral)   Resp 16   Wt 64 kg (141 lb)   LMP  (LMP Unknown)   SpO2 95%   BMI 26.64 kg/m   Estimated body mass index is 26.64 kg/m  as calculated from the following:    Height as of 3/13/24: 1.549 m (5' 1\").    Weight as of this encounter: 64 kg (141 lb). Body surface area is 1.66 meters squared.  No Pain (0) Comment: Data Unavailable   No LMP recorded (lmp unknown). Patient is postmenopausal.  Allergies reviewed: Yes  Medications reviewed: Yes    Medications: Medication refills not needed today.  Pharmacy name entered into [employer]:    [facility] - [place], [place] - [address]  [id number] - [place], [place] - [address] AT [facility]    Frailty Screening:   Is the patient here for a new oncology consult visit in cancer care? 2. No      Clinical concerns:   NP was notified.      Mariela Lyn, [facility]              Oncology/Hematology Visit Note  Mar 19, 2024    Reason for Visit: follow up of multiple myeloma IgG kappa high risk  Diagnosed 2017  Patient Dr. Parry  Patient is undergoing treatment with daratumumab pomalidomide and dexamethasone  -Due to the side effects Pomalyst reduced to 2 mg 2 weeks on 1 week off      Interval History:  03/13/2023-patient diagnosed with COVID her PCP prescribed molnupiravir.  Patient reports feeling better now cough is improving denies fever chills sweats shortness of breath chest pain          Review of " Systems:  14 point ROS of systems including Constitutional, Eyes, Respiratory, Cardiovascular, Gastroenterology, Genitourinary, Integumentary, Muscularskeletal, Psychiatric were all negative except for pertinent positives noted in my HPI.    Physical Examination:  Physical Exam  HENT:      Head: Normocephalic.      Right Ear: Tympanic membrane normal.      Nose: Nose normal.      Mouth/Throat:      Mouth: Mucous membranes are moist.   Eyes:      Pupils: Pupils are equal, round, and reactive to light.   Cardiovascular:      Rate and Rhythm: Normal rate.   Pulmonary:      Effort: Pulmonary effort is normal.   Abdominal:      General: Abdomen is flat.   Musculoskeletal:      Cervical back: Normal range of motion.   Skin:     General: Skin is warm.   Neurological:      General: No focal deficit present.      Mental Status: She is alert.   Psychiatric:         Mood and Affect: Mood normal.         LABS  -CBC reviewed    Assessment and Plan:    multiple myeloma IgG kappa high risk  Diagnosed 2017  Patient Dr. Parry  Patient is undergoing treatment with daratumumab, pomalidomide 3 mg- 3 weeks on 1 week off and 1 week off dexamethasone 4 mg once a week  -Patient is responding to treatment  Due to the side effects Pomalyst changed to to 2 mg 2 weeks on 1 week off  -Overall stable multiple myeloma labs.  -03/13/2023- patient diagnosed with COVID her PCP prescribed molnupiravir.  Which she completed  Patient reports cough is improved .   we hold treatment for 1 week and let her fully recovered before proceeding with treatment  hold off on Pomalyst and daratumumab for 1 week  Reschedule appointment for next week for daratumumab and follow-up with me      Anemia   Patient denies bleeding  Overall stable hemoglobin      Prophylaxis  Continue with aspirin and acyclovir    Bone health  Continue to hold Zometa until dental work-up is done    Generalized weakness  Chronic issue for the patient        Call our clinic with any changes  in health condition questions    MARIUSZ Mckeon CNP  [facility]     Chart documentation with Dragon Voice recognition Software. Although reviewed after completion, some words and grammatical errors may remain.      Again, thank you for allowing me to participate in the care of your patient.        Sincerely,        MARIUSZ Mckeon CNP

## 2024-03-19 NOTE — PROGRESS NOTES
Oncology/Hematology Visit Note  Mar 19, 2024    Reason for Visit: follow up of multiple myeloma IgG kappa high risk  Diagnosed 2017  Patient Dr. Parry  Patient is undergoing treatment with daratumumab pomalidomide and dexamethasone  -Due to the side effects Pomalyst reduced to 2 mg 2 weeks on 1 week off      Interval History:  03/13/2023-patient diagnosed with COVID her PCP prescribed molnupiravir.  Patient reports feeling better now cough is improving denies fever chills sweats shortness of breath chest pain          Review of Systems:  14 point ROS of systems including Constitutional, Eyes, Respiratory, Cardiovascular, Gastroenterology, Genitourinary, Integumentary, Muscularskeletal, Psychiatric were all negative except for pertinent positives noted in my HPI.    Physical Examination:  Physical Exam  HENT:      Head: Normocephalic.      Right Ear: Tympanic membrane normal.      Nose: Nose normal.      Mouth/Throat:      Mouth: Mucous membranes are moist.   Eyes:      Pupils: Pupils are equal, round, and reactive to light.   Cardiovascular:      Rate and Rhythm: Normal rate.   Pulmonary:      Effort: Pulmonary effort is normal.   Abdominal:      General: Abdomen is flat.   Musculoskeletal:      Cervical back: Normal range of motion.   Skin:     General: Skin is warm.   Neurological:      General: No focal deficit present.      Mental Status: She is alert.   Psychiatric:         Mood and Affect: Mood normal.         LABS  -CBC reviewed    Assessment and Plan:    multiple myeloma IgG kappa high risk  Diagnosed 2017  Patient Dr. Parry  Patient is undergoing treatment with daratumumab, pomalidomide 3 mg- 3 weeks on 1 week off and 1 week off dexamethasone 4 mg once a week  -Patient is responding to treatment  Due to the side effects Pomalyst changed to to 2 mg 2 weeks on 1 week off  -Overall stable multiple myeloma labs.  -03/13/2023- patient diagnosed with COVID her PCP prescribed molnupiravir.  Which she  completed  Patient reports cough is improved .   we hold treatment for 1 week and let her fully recovered before proceeding with treatment  hold off on Pomalyst and daratumumab for 1 week  Reschedule appointment for next week for daratumumab and follow-up with me      Anemia   Patient denies bleeding  Overall stable hemoglobin      Prophylaxis  Continue with aspirin and acyclovir    Bone health  Continue to hold Zometa until dental work-up is done    Generalized weakness  Chronic issue for the patient        Call our clinic with any changes in health condition questions    MARIUSZ Mckeon [facility]     Chart documentation with Dragon Voice recognition Software. Although reviewed after completion, some words and grammatical errors may remain.

## 2024-03-19 NOTE — PROGRESS NOTES
"Oncology Rooming Note    March 19, 2024 11:50 AM   Quiana Dunaway is a 88 year old female who presents for:    Chief Complaint   Patient presents with    Oncology Clinic Visit     Initial Vitals: /64   Pulse 70   Temp 98.2  F (36.8  C) (Oral)   Resp 16   Wt 64 kg (141 lb)   LMP  (LMP Unknown)   SpO2 95%   BMI 26.64 kg/m   Estimated body mass index is 26.64 kg/m  as calculated from the following:    Height as of 3/13/24: 1.549 m (5' 1\").    Weight as of this encounter: 64 kg (141 lb). Body surface area is 1.66 meters squared.  No Pain (0) Comment: Data Unavailable   No LMP recorded (lmp unknown). Patient is postmenopausal.  Allergies reviewed: Yes  Medications reviewed: Yes    Medications: Medication refills not needed today.  Pharmacy name entered into [facility]:    [facility] - [place], [place] - [address]  [id number] - [place], [place] - [address] AT [facility]    Frailty Screening:   Is the patient here for a new oncology consult visit in cancer care? 2. No      Clinical concerns:   NP was notified.      Mariela Lyn CMA            "

## 2024-03-20 LAB
IGG SERPL-MCNC: 892 MG/DL (ref 610–1616)
KAPPA LC FREE SER-MCNC: 36.14 MG/DL (ref 0.33–1.94)
KAPPA LC FREE/LAMBDA FREE SER NEPH: 88.15 {RATIO} (ref 0.26–1.65)
LAMBDA LC FREE SERPL-MCNC: 0.41 MG/DL (ref 0.57–2.63)
TOTAL PROTEIN SERUM FOR ELP: 6 G/DL (ref 6.4–8.3)

## 2024-03-21 ENCOUNTER — TELEPHONE (OUTPATIENT)
Dept: PHARMACY | Facility: CLINIC | Age: 89
End: 2024-03-21
Payer: MEDICARE

## 2024-03-21 LAB
ALBUMIN SERPL ELPH-MCNC: 3.5 G/DL (ref 3.7–5.1)
ALPHA1 GLOB SERPL ELPH-MCNC: 0.4 G/DL (ref 0.2–0.4)
ALPHA2 GLOB SERPL ELPH-MCNC: 0.7 G/DL (ref 0.5–0.9)
B-GLOBULIN SERPL ELPH-MCNC: 0.5 G/DL (ref 0.6–1)
GAMMA GLOB SERPL ELPH-MCNC: 0.8 G/DL (ref 0.7–1.6)
M PROTEIN SERPL ELPH-MCNC: 0.7 G/DL
PROT PATTERN SERPL ELPH-IMP: ABNORMAL

## 2024-03-21 NOTE — TELEPHONE ENCOUNTER
Oral Chemotherapy Monitoring Program   Medication: Pomalyst  Rx: 2mg PO daily on days 1 through 14 of 21 day cycle   Auth #:  [id number] obtained on 3/21/24  Risk Category: adult female of non child bearing potential  Routine survey questions reviewed.   Rx to be Escribed to [facility]     Terrie Bowen [facility] Oncology Pharmacy Liaison  [phone]

## 2024-03-26 ENCOUNTER — ANCILLARY PROCEDURE (OUTPATIENT)
Dept: CARDIOLOGY | Facility: CLINIC | Age: 89
End: 2024-03-26
Attending: INTERNAL MEDICINE
Payer: MEDICARE

## 2024-03-26 DIAGNOSIS — I49.5 SICK SINUS SYNDROME (H): ICD-10-CM

## 2024-03-26 DIAGNOSIS — Z95.0 CARDIAC PACEMAKER IN SITU: ICD-10-CM

## 2024-03-26 PROCEDURE — 93294 REM INTERROG EVL PM/LDLS PM: CPT | Performed by: INTERNAL MEDICINE

## 2024-03-26 PROCEDURE — 93296 REM INTERROG EVL PM/IDS: CPT | Performed by: INTERNAL MEDICINE

## 2024-03-28 DIAGNOSIS — R63.4 ABNORMAL LOSS OF WEIGHT: ICD-10-CM

## 2024-03-28 DIAGNOSIS — C90.00 MULTIPLE MYELOMA NOT HAVING ACHIEVED REMISSION (H): ICD-10-CM

## 2024-03-28 LAB
MDC_IDC_EPISODE_DTM: NORMAL
MDC_IDC_EPISODE_DURATION: 103 S
MDC_IDC_EPISODE_DURATION: 117 S
MDC_IDC_EPISODE_DURATION: 135 S
MDC_IDC_EPISODE_DURATION: 139 S
MDC_IDC_EPISODE_DURATION: 181 S
MDC_IDC_EPISODE_DURATION: 188 S
MDC_IDC_EPISODE_DURATION: 190 S
MDC_IDC_EPISODE_DURATION: 195 S
MDC_IDC_EPISODE_DURATION: 212 S
MDC_IDC_EPISODE_DURATION: 214 S
MDC_IDC_EPISODE_DURATION: 236 S
MDC_IDC_EPISODE_DURATION: 242 S
MDC_IDC_EPISODE_DURATION: 266 S
MDC_IDC_EPISODE_DURATION: 271 S
MDC_IDC_EPISODE_DURATION: 271 S
MDC_IDC_EPISODE_DURATION: 294 S
MDC_IDC_EPISODE_DURATION: 309 S
MDC_IDC_EPISODE_DURATION: 315 S
MDC_IDC_EPISODE_DURATION: 32 S
MDC_IDC_EPISODE_DURATION: 34 S
MDC_IDC_EPISODE_DURATION: 355 S
MDC_IDC_EPISODE_DURATION: 36 S
MDC_IDC_EPISODE_DURATION: 4 S
MDC_IDC_EPISODE_DURATION: 408 S
MDC_IDC_EPISODE_DURATION: 432 S
MDC_IDC_EPISODE_DURATION: 451 S
MDC_IDC_EPISODE_DURATION: 4732 S
MDC_IDC_EPISODE_DURATION: 507 S
MDC_IDC_EPISODE_DURATION: 566 S
MDC_IDC_EPISODE_DURATION: 587 S
MDC_IDC_EPISODE_DURATION: 708 S
MDC_IDC_EPISODE_DURATION: 74 S
MDC_IDC_EPISODE_DURATION: 82 S
MDC_IDC_EPISODE_DURATION: 88 S
MDC_IDC_EPISODE_DURATION: 98 S
MDC_IDC_EPISODE_ID: 100
MDC_IDC_EPISODE_ID: 101
MDC_IDC_EPISODE_ID: 102
MDC_IDC_EPISODE_ID: 103
MDC_IDC_EPISODE_ID: 104
MDC_IDC_EPISODE_ID: 105
MDC_IDC_EPISODE_ID: 106
MDC_IDC_EPISODE_ID: 107
MDC_IDC_EPISODE_ID: 108
MDC_IDC_EPISODE_ID: 109
MDC_IDC_EPISODE_ID: 110
MDC_IDC_EPISODE_ID: 111
MDC_IDC_EPISODE_ID: 112
MDC_IDC_EPISODE_ID: 113
MDC_IDC_EPISODE_ID: 114
MDC_IDC_EPISODE_ID: 115
MDC_IDC_EPISODE_ID: 116
MDC_IDC_EPISODE_ID: 117
MDC_IDC_EPISODE_ID: 118
MDC_IDC_EPISODE_ID: 119
MDC_IDC_EPISODE_ID: 120
MDC_IDC_EPISODE_ID: 121
MDC_IDC_EPISODE_ID: 122
MDC_IDC_EPISODE_ID: 123
MDC_IDC_EPISODE_ID: 124
MDC_IDC_EPISODE_ID: 125
MDC_IDC_EPISODE_ID: 126
MDC_IDC_EPISODE_ID: 127
MDC_IDC_EPISODE_ID: 128
MDC_IDC_EPISODE_ID: 129
MDC_IDC_EPISODE_ID: 130
MDC_IDC_EPISODE_ID: 131
MDC_IDC_EPISODE_ID: 132
MDC_IDC_EPISODE_ID: 133
MDC_IDC_EPISODE_ID: 134
MDC_IDC_EPISODE_ID: 98
MDC_IDC_EPISODE_ID: 99
MDC_IDC_EPISODE_TYPE: NORMAL
MDC_IDC_LEAD_CONNECTION_STATUS: NORMAL
MDC_IDC_LEAD_CONNECTION_STATUS: NORMAL
MDC_IDC_LEAD_IMPLANT_DT: NORMAL
MDC_IDC_LEAD_IMPLANT_DT: NORMAL
MDC_IDC_LEAD_LOCATION: NORMAL
MDC_IDC_LEAD_LOCATION: NORMAL
MDC_IDC_LEAD_LOCATION_DETAIL_1: NORMAL
MDC_IDC_LEAD_LOCATION_DETAIL_1: NORMAL
MDC_IDC_LEAD_MFG: NORMAL
MDC_IDC_LEAD_MFG: NORMAL
MDC_IDC_LEAD_MODEL: NORMAL
MDC_IDC_LEAD_MODEL: NORMAL
MDC_IDC_LEAD_POLARITY_TYPE: NORMAL
MDC_IDC_LEAD_POLARITY_TYPE: NORMAL
MDC_IDC_LEAD_SERIAL: NORMAL
MDC_IDC_LEAD_SERIAL: NORMAL
MDC_IDC_MSMT_BATTERY_DTM: NORMAL
MDC_IDC_MSMT_BATTERY_REMAINING_LONGEVITY: 12 MO
MDC_IDC_MSMT_BATTERY_RRT_TRIGGER: 2.83
MDC_IDC_MSMT_BATTERY_STATUS: NORMAL
MDC_IDC_MSMT_BATTERY_VOLTAGE: 2.9 V
MDC_IDC_MSMT_LEADCHNL_RA_IMPEDANCE_VALUE: 323 OHM
MDC_IDC_MSMT_LEADCHNL_RA_IMPEDANCE_VALUE: 342 OHM
MDC_IDC_MSMT_LEADCHNL_RA_PACING_THRESHOLD_AMPLITUDE: 0.38 V
MDC_IDC_MSMT_LEADCHNL_RA_PACING_THRESHOLD_PULSEWIDTH: 0.4 MS
MDC_IDC_MSMT_LEADCHNL_RA_SENSING_INTR_AMPL: 1.5 MV
MDC_IDC_MSMT_LEADCHNL_RA_SENSING_INTR_AMPL: 1.5 MV
MDC_IDC_MSMT_LEADCHNL_RV_IMPEDANCE_VALUE: 361 OHM
MDC_IDC_MSMT_LEADCHNL_RV_IMPEDANCE_VALUE: 475 OHM
MDC_IDC_MSMT_LEADCHNL_RV_PACING_THRESHOLD_AMPLITUDE: 2.25 V
MDC_IDC_MSMT_LEADCHNL_RV_PACING_THRESHOLD_PULSEWIDTH: 0.4 MS
MDC_IDC_MSMT_LEADCHNL_RV_SENSING_INTR_AMPL: 2.5 MV
MDC_IDC_MSMT_LEADCHNL_RV_SENSING_INTR_AMPL: 2.5 MV
MDC_IDC_PG_IMPLANT_DTM: NORMAL
MDC_IDC_PG_MFG: NORMAL
MDC_IDC_PG_MODEL: NORMAL
MDC_IDC_PG_SERIAL: NORMAL
MDC_IDC_PG_TYPE: NORMAL
MDC_IDC_SESS_CLINIC_NAME: NORMAL
MDC_IDC_SESS_DTM: NORMAL
MDC_IDC_SESS_TYPE: NORMAL
MDC_IDC_SET_BRADY_AT_MODE_SWITCH_RATE: 171 {BEATS}/MIN
MDC_IDC_SET_BRADY_HYSTRATE: NORMAL
MDC_IDC_SET_BRADY_LOWRATE: 60 {BEATS}/MIN
MDC_IDC_SET_BRADY_MAX_SENSOR_RATE: 120 {BEATS}/MIN
MDC_IDC_SET_BRADY_MAX_TRACKING_RATE: 120 {BEATS}/MIN
MDC_IDC_SET_BRADY_MODE: NORMAL
MDC_IDC_SET_BRADY_PAV_DELAY_LOW: 300 MS
MDC_IDC_SET_BRADY_SAV_DELAY_LOW: 300 MS
MDC_IDC_SET_LEADCHNL_RA_PACING_AMPLITUDE: 1.5 V
MDC_IDC_SET_LEADCHNL_RA_PACING_ANODE_ELECTRODE_1: NORMAL
MDC_IDC_SET_LEADCHNL_RA_PACING_ANODE_LOCATION_1: NORMAL
MDC_IDC_SET_LEADCHNL_RA_PACING_CAPTURE_MODE: NORMAL
MDC_IDC_SET_LEADCHNL_RA_PACING_CATHODE_ELECTRODE_1: NORMAL
MDC_IDC_SET_LEADCHNL_RA_PACING_CATHODE_LOCATION_1: NORMAL
MDC_IDC_SET_LEADCHNL_RA_PACING_POLARITY: NORMAL
MDC_IDC_SET_LEADCHNL_RA_PACING_PULSEWIDTH: 0.4 MS
MDC_IDC_SET_LEADCHNL_RA_SENSING_ANODE_ELECTRODE_1: NORMAL
MDC_IDC_SET_LEADCHNL_RA_SENSING_ANODE_LOCATION_1: NORMAL
MDC_IDC_SET_LEADCHNL_RA_SENSING_CATHODE_ELECTRODE_1: NORMAL
MDC_IDC_SET_LEADCHNL_RA_SENSING_CATHODE_LOCATION_1: NORMAL
MDC_IDC_SET_LEADCHNL_RA_SENSING_POLARITY: NORMAL
MDC_IDC_SET_LEADCHNL_RA_SENSING_SENSITIVITY: 0.3 MV
MDC_IDC_SET_LEADCHNL_RV_PACING_AMPLITUDE: 4.5 V
MDC_IDC_SET_LEADCHNL_RV_PACING_ANODE_ELECTRODE_1: NORMAL
MDC_IDC_SET_LEADCHNL_RV_PACING_ANODE_LOCATION_1: NORMAL
MDC_IDC_SET_LEADCHNL_RV_PACING_CAPTURE_MODE: NORMAL
MDC_IDC_SET_LEADCHNL_RV_PACING_CATHODE_ELECTRODE_1: NORMAL
MDC_IDC_SET_LEADCHNL_RV_PACING_CATHODE_LOCATION_1: NORMAL
MDC_IDC_SET_LEADCHNL_RV_PACING_POLARITY: NORMAL
MDC_IDC_SET_LEADCHNL_RV_PACING_PULSEWIDTH: 0.4 MS
MDC_IDC_SET_LEADCHNL_RV_SENSING_ANODE_ELECTRODE_1: NORMAL
MDC_IDC_SET_LEADCHNL_RV_SENSING_ANODE_LOCATION_1: NORMAL
MDC_IDC_SET_LEADCHNL_RV_SENSING_CATHODE_ELECTRODE_1: NORMAL
MDC_IDC_SET_LEADCHNL_RV_SENSING_CATHODE_LOCATION_1: NORMAL
MDC_IDC_SET_LEADCHNL_RV_SENSING_POLARITY: NORMAL
MDC_IDC_SET_LEADCHNL_RV_SENSING_SENSITIVITY: 0.9 MV
MDC_IDC_SET_ZONE_DETECTION_INTERVAL: 350 MS
MDC_IDC_SET_ZONE_DETECTION_INTERVAL: 400 MS
MDC_IDC_SET_ZONE_STATUS: NORMAL
MDC_IDC_SET_ZONE_STATUS: NORMAL
MDC_IDC_SET_ZONE_TYPE: NORMAL
MDC_IDC_SET_ZONE_VENDOR_TYPE: NORMAL
MDC_IDC_STAT_AT_BURDEN_PERCENT: 0.1 %
MDC_IDC_STAT_AT_DTM_END: NORMAL
MDC_IDC_STAT_AT_DTM_START: NORMAL
MDC_IDC_STAT_BRADY_AP_VP_PERCENT: 86.28 %
MDC_IDC_STAT_BRADY_AP_VS_PERCENT: 0.03 %
MDC_IDC_STAT_BRADY_AS_VP_PERCENT: 13.69 %
MDC_IDC_STAT_BRADY_AS_VS_PERCENT: 0.01 %
MDC_IDC_STAT_BRADY_DTM_END: NORMAL
MDC_IDC_STAT_BRADY_DTM_START: NORMAL
MDC_IDC_STAT_BRADY_RA_PERCENT_PACED: 86.18 %
MDC_IDC_STAT_BRADY_RV_PERCENT_PACED: 99.97 %
MDC_IDC_STAT_EPISODE_RECENT_COUNT: 0
MDC_IDC_STAT_EPISODE_RECENT_COUNT: 37
MDC_IDC_STAT_EPISODE_RECENT_COUNT_DTM_END: NORMAL
MDC_IDC_STAT_EPISODE_RECENT_COUNT_DTM_START: NORMAL
MDC_IDC_STAT_EPISODE_TOTAL_COUNT: 0
MDC_IDC_STAT_EPISODE_TOTAL_COUNT: 0
MDC_IDC_STAT_EPISODE_TOTAL_COUNT: 117
MDC_IDC_STAT_EPISODE_TOTAL_COUNT: 14
MDC_IDC_STAT_EPISODE_TOTAL_COUNT_DTM_END: NORMAL
MDC_IDC_STAT_EPISODE_TOTAL_COUNT_DTM_START: NORMAL
MDC_IDC_STAT_EPISODE_TYPE: NORMAL
MDC_IDC_STAT_TACHYTHERAPY_RECENT_DTM_END: NORMAL
MDC_IDC_STAT_TACHYTHERAPY_RECENT_DTM_START: NORMAL
MDC_IDC_STAT_TACHYTHERAPY_TOTAL_DTM_END: NORMAL
MDC_IDC_STAT_TACHYTHERAPY_TOTAL_DTM_START: NORMAL

## 2024-03-28 RX ORDER — MIRTAZAPINE 15 MG/1
15 TABLET, FILM COATED ORAL AT BEDTIME
Qty: 90 TABLET | Refills: 3 | Status: SHIPPED | OUTPATIENT
Start: 2024-03-28

## 2024-03-28 RX ORDER — LORAZEPAM 0.5 MG/1
0.5 TABLET ORAL EVERY 6 HOURS PRN
Qty: 30 TABLET | Refills: 3 | Status: SHIPPED | OUTPATIENT
Start: 2024-03-28

## 2024-03-29 ENCOUNTER — TELEPHONE (OUTPATIENT)
Dept: ONCOLOGY | Facility: CLINIC | Age: 89
End: 2024-03-29
Payer: MEDICARE

## 2024-03-29 NOTE — TELEPHONE ENCOUNTER
[facility]  Fax:[phone]  Spec: [phone]  MO: [phone]

## 2024-04-02 ENCOUNTER — ONCOLOGY VISIT (OUTPATIENT)
Dept: ONCOLOGY | Facility: CLINIC | Age: 89
End: 2024-04-02
Attending: INTERNAL MEDICINE
Payer: MEDICARE

## 2024-04-02 ENCOUNTER — INFUSION THERAPY VISIT (OUTPATIENT)
Dept: INFUSION THERAPY | Facility: CLINIC | Age: 89
End: 2024-04-02
Attending: INTERNAL MEDICINE
Payer: MEDICARE

## 2024-04-02 VITALS
TEMPERATURE: 97.9 F | DIASTOLIC BLOOD PRESSURE: 76 MMHG | HEART RATE: 85 BPM | SYSTOLIC BLOOD PRESSURE: 116 MMHG | OXYGEN SATURATION: 96 % | RESPIRATION RATE: 16 BRPM

## 2024-04-02 VITALS
TEMPERATURE: 97.9 F | SYSTOLIC BLOOD PRESSURE: 116 MMHG | HEART RATE: 85 BPM | DIASTOLIC BLOOD PRESSURE: 76 MMHG | RESPIRATION RATE: 16 BRPM | OXYGEN SATURATION: 96 %

## 2024-04-02 DIAGNOSIS — C90.00 MULTIPLE MYELOMA NOT HAVING ACHIEVED REMISSION (H): Primary | ICD-10-CM

## 2024-04-02 LAB
ALBUMIN SERPL BCG-MCNC: 3.6 G/DL (ref 3.5–5.2)
ALP SERPL-CCNC: 88 U/L (ref 40–150)
ALT SERPL W P-5'-P-CCNC: 5 U/L (ref 0–50)
ANION GAP SERPL CALCULATED.3IONS-SCNC: 13 MMOL/L (ref 7–15)
AST SERPL W P-5'-P-CCNC: 7 U/L (ref 0–45)
BASOPHILS # BLD AUTO: 0.1 10E3/UL (ref 0–0.2)
BASOPHILS NFR BLD AUTO: 1 %
BILIRUB SERPL-MCNC: 0.3 MG/DL
BUN SERPL-MCNC: 27.8 MG/DL (ref 8–23)
CALCIUM SERPL-MCNC: 8.6 MG/DL (ref 8.8–10.2)
CHLORIDE SERPL-SCNC: 108 MMOL/L (ref 98–107)
CREAT SERPL-MCNC: 0.87 MG/DL (ref 0.51–0.95)
DEPRECATED HCO3 PLAS-SCNC: 23 MMOL/L (ref 22–29)
EGFRCR SERPLBLD CKD-EPI 2021: 64 ML/MIN/1.73M2
EOSINOPHIL # BLD AUTO: 0.2 10E3/UL (ref 0–0.7)
EOSINOPHIL NFR BLD AUTO: 3 %
ERYTHROCYTE [DISTWIDTH] IN BLOOD BY AUTOMATED COUNT: 14.2 % (ref 10–15)
GLUCOSE SERPL-MCNC: 72 MG/DL (ref 70–99)
HCT VFR BLD AUTO: 33.9 % (ref 35–47)
HGB BLD-MCNC: 10.7 G/DL (ref 11.7–15.7)
IMM GRANULOCYTES # BLD: 0 10E3/UL
IMM GRANULOCYTES NFR BLD: 1 %
LYMPHOCYTES # BLD AUTO: 2.4 10E3/UL (ref 0.8–5.3)
LYMPHOCYTES NFR BLD AUTO: 40 %
MCH RBC QN AUTO: 29.6 PG (ref 26.5–33)
MCHC RBC AUTO-ENTMCNC: 31.6 G/DL (ref 31.5–36.5)
MCV RBC AUTO: 94 FL (ref 78–100)
MONOCYTES # BLD AUTO: 0.7 10E3/UL (ref 0–1.3)
MONOCYTES NFR BLD AUTO: 12 %
NEUTROPHILS # BLD AUTO: 2.6 10E3/UL (ref 1.6–8.3)
NEUTROPHILS NFR BLD AUTO: 43 %
NRBC # BLD AUTO: 0 10E3/UL
NRBC BLD AUTO-RTO: 0 /100
PLATELET # BLD AUTO: 197 10E3/UL (ref 150–450)
POTASSIUM SERPL-SCNC: 3.4 MMOL/L (ref 3.4–5.3)
PROT SERPL-MCNC: 6.7 G/DL (ref 6.4–8.3)
RBC # BLD AUTO: 3.62 10E6/UL (ref 3.8–5.2)
SODIUM SERPL-SCNC: 144 MMOL/L (ref 135–145)
TOTAL PROTEIN SERUM FOR ELP: 6.2 G/DL (ref 6.4–8.3)
WBC # BLD AUTO: 6 10E3/UL (ref 4–11)

## 2024-04-02 PROCEDURE — 82784 ASSAY IGA/IGD/IGG/IGM EACH: CPT | Performed by: INTERNAL MEDICINE

## 2024-04-02 PROCEDURE — 84155 ASSAY OF PROTEIN SERUM: CPT | Mod: 91 | Performed by: INTERNAL MEDICINE

## 2024-04-02 PROCEDURE — 84165 PROTEIN E-PHORESIS SERUM: CPT | Mod: TC | Performed by: STUDENT IN AN ORGANIZED HEALTH CARE EDUCATION/TRAINING PROGRAM

## 2024-04-02 PROCEDURE — 82374 ASSAY BLOOD CARBON DIOXIDE: CPT | Performed by: INTERNAL MEDICINE

## 2024-04-02 PROCEDURE — G0463 HOSPITAL OUTPT CLINIC VISIT: HCPCS | Mod: 25 | Performed by: NURSE PRACTITIONER

## 2024-04-02 PROCEDURE — 84165 PROTEIN E-PHORESIS SERUM: CPT | Mod: 26 | Performed by: STUDENT IN AN ORGANIZED HEALTH CARE EDUCATION/TRAINING PROGRAM

## 2024-04-02 PROCEDURE — 96401 CHEMO ANTI-NEOPL SQ/IM: CPT

## 2024-04-02 PROCEDURE — 99213 OFFICE O/P EST LOW 20 MIN: CPT | Performed by: NURSE PRACTITIONER

## 2024-04-02 PROCEDURE — 250N000011 HC RX IP 250 OP 636: Mod: JZ | Performed by: INTERNAL MEDICINE

## 2024-04-02 PROCEDURE — 85025 COMPLETE CBC W/AUTO DIFF WBC: CPT | Performed by: INTERNAL MEDICINE

## 2024-04-02 PROCEDURE — 83521 IG LIGHT CHAINS FREE EACH: CPT | Performed by: INTERNAL MEDICINE

## 2024-04-02 PROCEDURE — 36415 COLL VENOUS BLD VENIPUNCTURE: CPT | Performed by: INTERNAL MEDICINE

## 2024-04-02 RX ADMIN — DARATUMUMAB AND HYALURONIDASE-FIHJ (HUMAN RECOMBINANT) 1800 MG: 1800; 30000 INJECTION SUBCUTANEOUS at 09:23

## 2024-04-02 ASSESSMENT — PAIN SCALES - GENERAL: PAINLEVEL: NO PAIN (0)

## 2024-04-02 NOTE — LETTER
4/2/2024         RE: Quiana Dunaway  [address] [facility] [address]        Dear Colleague,    Thank you for referring your patient, Quiana Dunaway, to the [facility]. Please see a copy of my visit note below.    Oncology/Hematology Visit Note  Apr 2, 2024    Reason for Visit: follow up of multiple myeloma IgG kappa high risk  Diagnosed 2017  Patient Dr. Parry  Patient is undergoing treatment with daratumumab pomalidomide and dexamethasone  -Due to the side effects Pomalyst reduced to 2 mg 2 weeks on 1 week off      Interval History:  03/13/2023-patient diagnosed with COVID her PCP prescribed molnupiravir.  patient denies fever chills sweats cough shortness of breath chest pain nausea vomiting diarrhea abdominal pain or bleeding        Review of Systems:  14 point ROS of systems including Constitutional, Eyes, Respiratory, Cardiovascular, Gastroenterology, Genitourinary, Integumentary, Muscularskeletal, Psychiatric were all negative except for pertinent positives noted in my HPI.    Physical Examination:  Physical Exam  HENT:      Head: Normocephalic.      Right Ear: Tympanic membrane normal.      Nose: Nose normal.      Mouth/Throat:      Mouth: Mucous membranes are moist.   Eyes:      Pupils: Pupils are equal, round, and reactive to light.   Cardiovascular:      Rate and Rhythm: Normal rate.   Pulmonary:      Effort: Pulmonary effort is normal.   Abdominal:      General: Abdomen is flat.   Musculoskeletal:      Cervical back: Normal range of motion.   Skin:     General: Skin is warm.   Neurological:      General: No focal deficit present.      Mental Status: She is alert.   Psychiatric:         Mood and Affect: Mood normal.         LABS  -CBC reviewed    Assessment and Plan:    multiple myeloma IgG kappa high risk  Diagnosed 2017  Patient Dr. Parry  Patient is undergoing treatment with daratumumab, pomalidomide 3 mg- 3 weeks on 1 week off and 1 week off dexamethasone 4  mg once a week  -Patient is responding to treatment  Due to the side effects Pomalyst changed to to 2 mg 2 weeks on 1 week off  -Overall stable multiple myeloma labs.  -03/13/2023- patient diagnosed with COVID her PCP prescribed molnupiravir.  Which she completed  -We held treatment  Okay to restart treatment now continue with Pomalyst and Darzalex and dexamethasone  -Patient reports she would like to postpone next cycle Darzalex Pomalyst by 1 week due to Passover Holiday she will have family visiting and she wants to feel well  -Schedule for next Darzalex ton May 2 for May 6 and follow-up appointment with Dr. Parry    Anemia   Patient denies bleeding  Overall stable hemoglobin      Prophylaxis  Continue with aspirin and acyclovir    Bone health  Continue to hold Zometa until dental work-up is done    Generalized weakness  Chronic issue for the patient        Call our clinic with any changes in health condition questions    MARIUSZ Mckeon CNP  [facility]     Chart documentation with Dragon Voice recognition Software. Although reviewed after completion, some words and grammatical errors may remain.    Again, thank you for allowing me to participate in the care of your patient.        Sincerely,        MARIUSZ Mckeon CNP

## 2024-04-02 NOTE — PROGRESS NOTES
Infusion Nursing Note:  Quiana ANA Dunaway presents today for labs/Darzalax Faspro.    Patient seen by provider today: Yes: Juaquin CASTLE   present during visit today: Not Applicable.    Note: Appointment schedule off d/t treatment being deferred 3/19. Asked  to change next apt from 4/17 to 4/30.      Intravenous Access:  Lab draw site L AC, Needle type butterfly, Gauge 23.    Treatment Conditions:  Lab Results   Component Value Date    HGB 10.7 (L) 04/02/2024    WBC 6.0 04/02/2024    ANEU 1.6 02/20/2024    ANEUTAUTO 2.6 04/02/2024     04/02/2024        Lab Results   Component Value Date     04/02/2024    POTASSIUM 3.4 04/02/2024    MAG 2.4 (H) 05/26/2020    CR 0.87 04/02/2024    HUMBERTO 8.6 (L) 04/02/2024    BILITOTAL 0.3 04/02/2024    ALBUMIN 3.6 04/02/2024    ALT 5 04/02/2024    AST 7 04/02/2024       Results reviewed, labs MET treatment parameters, ok to proceed with treatment.      Post Infusion Assessment:  Patient tolerated injection without incident.  Site patent and intact, free from redness, edema or discomfort.       Discharge Plan:   Discharge instructions reviewed with: Patient.  AVS to patient via [employer].  Patient will return 4/30/24 for next appointment.   Patient discharged in stable condition accompanied by: daughter-in-law.  Departure Mode: Ambulatory with walker.      Kim Amado RN

## 2024-04-02 NOTE — PROGRESS NOTES
Oncology/Hematology Visit Note  Apr 2, 2024    Reason for Visit: follow up of multiple myeloma IgG kappa high risk  Diagnosed 2017  Patient Dr. Parry  Patient is undergoing treatment with daratumumab pomalidomide and dexamethasone  -Due to the side effects Pomalyst reduced to 2 mg 2 weeks on 1 week off      Interval History:  03/13/2023-patient diagnosed with COVID her PCP prescribed molnupiravir.  patient denies fever chills sweats cough shortness of breath chest pain nausea vomiting diarrhea abdominal pain or bleeding        Review of Systems:  14 point ROS of systems including Constitutional, Eyes, Respiratory, Cardiovascular, Gastroenterology, Genitourinary, Integumentary, Muscularskeletal, Psychiatric were all negative except for pertinent positives noted in my HPI.    Physical Examination:  Physical Exam  HENT:      Head: Normocephalic.      Right Ear: Tympanic membrane normal.      Nose: Nose normal.      Mouth/Throat:      Mouth: Mucous membranes are moist.   Eyes:      Pupils: Pupils are equal, round, and reactive to light.   Cardiovascular:      Rate and Rhythm: Normal rate.   Pulmonary:      Effort: Pulmonary effort is normal.   Abdominal:      General: Abdomen is flat.   Musculoskeletal:      Cervical back: Normal range of motion.   Skin:     General: Skin is warm.   Neurological:      General: No focal deficit present.      Mental Status: She is alert.   Psychiatric:         Mood and Affect: Mood normal.         LABS  -CBC reviewed    Assessment and Plan:    multiple myeloma IgG kappa high risk  Diagnosed 2017  Patient Dr. Parry  Patient is undergoing treatment with daratumumab, pomalidomide 3 mg- 3 weeks on 1 week off and 1 week off dexamethasone 4 mg once a week  -Patient is responding to treatment  Due to the side effects Pomalyst changed to to 2 mg 2 weeks on 1 week off  -Overall stable multiple myeloma labs.  -03/13/2023- patient diagnosed with COVID her PCP prescribed molnupiravir.  Which she  completed  -We held treatment  Okay to restart treatment now continue with Pomalyst and Darzalex and dexamethasone  -Patient reports she would like to postpone next cycle Darzalex Pomalyst by 1 week due to Passover Holiday she will have family visiting and she wants to feel well  -Schedule for next Darzalex ton May 2 for May 6 and follow-up appointment with Dr. Parry    Anemia   Patient denies bleeding  Overall stable hemoglobin      Prophylaxis  Continue with aspirin and acyclovir    Bone health  Continue to hold Zometa until dental work-up is done    Generalized weakness  Chronic issue for the patient        Call our clinic with any changes in health condition questions    MARIUSZ Mckeon CNP  [facility] [place]     Chart documentation with Dragon Voice recognition Software. Although reviewed after completion, some words and grammatical errors may remain.

## 2024-04-03 LAB
ALBUMIN SERPL ELPH-MCNC: 3.4 G/DL (ref 3.7–5.1)
ALPHA1 GLOB SERPL ELPH-MCNC: 0.4 G/DL (ref 0.2–0.4)
ALPHA2 GLOB SERPL ELPH-MCNC: 0.9 G/DL (ref 0.5–0.9)
B-GLOBULIN SERPL ELPH-MCNC: 0.6 G/DL (ref 0.6–1)
GAMMA GLOB SERPL ELPH-MCNC: 0.9 G/DL (ref 0.7–1.6)
IGG SERPL-MCNC: 1084 MG/DL (ref 610–1616)
KAPPA LC FREE SER-MCNC: 69.89 MG/DL (ref 0.33–1.94)
KAPPA LC FREE/LAMBDA FREE SER NEPH: 174.73 {RATIO} (ref 0.26–1.65)
LAMBDA LC FREE SERPL-MCNC: 0.4 MG/DL (ref 0.57–2.63)
M PROTEIN SERPL ELPH-MCNC: 0.8 G/DL
PROT PATTERN SERPL ELPH-IMP: ABNORMAL

## 2024-04-03 NOTE — RESULT ENCOUNTER NOTE
Dear Ms. Dunaway,    [place] free light chain has increased. Will monitor it. We will discuss regarding it during appointment.    Please, call me with any questions.    Alex Parry MD

## 2024-04-12 NOTE — TELEPHONE ENCOUNTER
CMM  states that PA is Not Needed; however, pharmacy is unable to process medication. Called insurance and Rep Zoila stated that PA is Needed and will be faxing PA Form to complete. Awaiting on PA Form.

## 2024-04-15 NOTE — TELEPHONE ENCOUNTER
Patient is within limits of 4 tablets/day, but pharmacy is running this as a 7 day supply instead of 8 days. Pharmacy was notified to correct their claim or contact the help desk for assistance.     Prior Authorization Not Needed per Insurance    Medication: LORAZEPAM 0.5 MG PO TABS  Insurance Company: [employer] - Phone [phone] Fax [phone]  Expected CoPay: $    Pharmacy Filling the Rx: [facility] - [place], [place] - [address]  Pharmacy Notified: Yes  Patient Notified:

## 2024-04-16 DIAGNOSIS — C90.00 MULTIPLE MYELOMA NOT HAVING ACHIEVED REMISSION (H): Primary | ICD-10-CM

## 2024-04-22 ENCOUNTER — TELEPHONE (OUTPATIENT)
Dept: ONCOLOGY | Facility: CLINIC | Age: 89
End: 2024-04-22
Payer: MEDICARE

## 2024-04-22 NOTE — TELEPHONE ENCOUNTER
Retail Pharmacy Prior Authorization Team   Phone: [phone]    PA Initiation-Required for dose increase.    Medication: LORAZEPAM 0.5 MG PO TABS  Insurance Company: [employer] - Phone [phone] Fax [phone]  Pharmacy Filling the Rx: [facility] - [place], [place] - [address]  Filling Pharmacy Phone:    Filling Pharmacy Fax:    Start Date: 4/22/2024

## 2024-04-22 NOTE — TELEPHONE ENCOUNTER
Prior Authorization Approval    Medication: LORAZEPAM 0.5 MG PO TABS  Authorization Effective Date: 1/23/2024  Authorization Expiration Date: 4/22/2025  Approved Dose/Quantity:   Reference #:     Insurance Company: [employer] - Phone [phone] Fax [phone]  Expected CoPay: $    CoPay Card Available:      Financial Assistance Needed:   Which Pharmacy is filling the prescription: [facility] - [place] [address]  Pharmacy Notified: Yes  Patient Notified:

## 2024-04-22 NOTE — TELEPHONE ENCOUNTER
[facility]    Fax: [phone]    Spec: [phone]    MO: [phone]

## 2024-04-29 ENCOUNTER — TELEPHONE (OUTPATIENT)
Dept: PHARMACY | Facility: CLINIC | Age: 89
End: 2024-04-29
Payer: MEDICARE

## 2024-04-29 NOTE — TELEPHONE ENCOUNTER
Oral Chemotherapy Monitoring Program   Medication: Pomalyst  Rx: 2mg PO daily on days 1 through 14 of 21 day cycle   Auth #:  [id number] obtained on 4/29/24  Risk Category: adult female of non child bearing potential  Routine survey questions reviewed.   Rx to be Escribed to [facility]     Terrie Bowen [facility] Oncology Pharmacy Liaison  [phone]

## 2024-05-01 ENCOUNTER — OFFICE VISIT (OUTPATIENT)
Dept: FAMILY MEDICINE | Facility: CLINIC | Age: 89
End: 2024-05-01
Payer: MEDICARE

## 2024-05-01 VITALS
WEIGHT: 141.5 LBS | HEART RATE: 85 BPM | BODY MASS INDEX: 26.71 KG/M2 | TEMPERATURE: 97.9 F | RESPIRATION RATE: 18 BRPM | OXYGEN SATURATION: 94 % | SYSTOLIC BLOOD PRESSURE: 134 MMHG | DIASTOLIC BLOOD PRESSURE: 78 MMHG | HEIGHT: 61 IN

## 2024-05-01 DIAGNOSIS — C90.00 MULTIPLE MYELOMA NOT HAVING ACHIEVED REMISSION (H): ICD-10-CM

## 2024-05-01 DIAGNOSIS — E05.20 TOXIC MULTINODUL GOITER: ICD-10-CM

## 2024-05-01 DIAGNOSIS — I10 BENIGN ESSENTIAL HYPERTENSION: ICD-10-CM

## 2024-05-01 DIAGNOSIS — I50.32 CHRONIC DIASTOLIC CONGESTIVE HEART FAILURE (H): Primary | ICD-10-CM

## 2024-05-01 PROCEDURE — 99214 OFFICE O/P EST MOD 30 MIN: CPT | Performed by: INTERNAL MEDICINE

## 2024-05-01 RX ORDER — FUROSEMIDE 20 MG
TABLET ORAL
Qty: 90 TABLET | Refills: 3 | Status: SHIPPED | OUTPATIENT
Start: 2024-05-01

## 2024-05-01 ASSESSMENT — PAIN SCALES - GENERAL: PAINLEVEL: NO PAIN (0)

## 2024-05-01 NOTE — PROGRESS NOTES
Assessment & Plan     Chronic diastolic congestive heart failure (H)  Refer to patient instructions, short course of furosemide could be helpful for lower extremity edema probably related to diet indiscretion during the holiday last week.  - furosemide (LASIX) 20 MG tablet; Take once daily as needed for swelling or blood pressure greater than 170/100    Toxic multinodul goiter  Try to get thyroid studies done with upcoming labs continue current methimazole  - TSH with free T4 reflex; Future    Benign essential hypertension  Okay control    Multiple myeloma not having achieved remission (H)  Continue follow-up with oncology as directed      No LOS data to display   Time spent by me doing chart review, history and exam, documentation and further activities per the note        FUTURE APPOINTMENTS:       - Follow-up with whom? Me Follow-Up for what? Chronic Disease f/u Chronic Disease f/u: Hypertension How? In Person Future, Expected: 6/12/2024 Approximate, Patient instructed to return to clinic or contact us sooner if symptoms worsen or new symptoms develop.     Dirk Araya is a 88 year old, presenting for the following health issues:  Follow Up    History of Present Illness       Reason for visit:  Blood pressure    She eats 0-1 servings of fruits and vegetables daily.She consumes 1 sweetened beverage(s) daily.She exercises with enough effort to increase her heart rate 9 or less minutes per day.  She exercises with enough effort to increase her heart rate 3 or less days per week.   She is taking medications regularly.       Here for routine follow-up of her toxic multinodular goiter, CHF, hypertension, mild anxiety, multiple myeloma    She generally feels well but notes some increased swelling in her bilateral legs after the holiday last week during which she ate a lot more salty food than she is used to.  She denies orthopnea, PND or new dyspnea on exertion.    She was to have her thyroid function tests  "drawn at her last routine lab visit for the oncology clinic, but for some reason it was not drawn      Objective    /78 (BP Location: Left arm, Patient Position: Sitting, Cuff Size: Adult Regular)   Pulse 85   Temp 97.9  F (36.6  C) (Oral)   Resp 18   Ht 1.549 m (5' 1\")   Wt 64.2 kg (141 lb 8 oz)   LMP  (LMP Unknown)   SpO2 94%   BMI 26.74 kg/m    Body mass index is 26.74 kg/m .  Physical Exam   General: This is a well-appearing older female in no acute distress.  Cardiovascular: The heart has a regular rate and rhythm.  Pulmonary: The lungs are clear to auscultation bilaterally, breathing does not appear to be labored.  Extremities: There is trace edema in the bilateral lower extremities noted.  Mental state: Appropriate mood and affect, well-groomed, normal speech.            Signed Electronically by: César Chung MD    "

## 2024-05-01 NOTE — PATIENT INSTRUCTIONS
To help with your swelling, take furosemide (Lasix) in the AM tomorrow and Friday morning.  I think this should take care of the swelling as long as you stay away from too much salt and you elevate your legs when you can.

## 2024-05-06 ENCOUNTER — LAB (OUTPATIENT)
Dept: INFUSION THERAPY | Facility: CLINIC | Age: 89
End: 2024-05-06
Attending: INTERNAL MEDICINE
Payer: MEDICARE

## 2024-05-06 ENCOUNTER — ONCOLOGY VISIT (OUTPATIENT)
Dept: ONCOLOGY | Facility: CLINIC | Age: 89
End: 2024-05-06
Attending: INTERNAL MEDICINE
Payer: MEDICARE

## 2024-05-06 VITALS
HEIGHT: 61 IN | HEART RATE: 80 BPM | OXYGEN SATURATION: 98 % | WEIGHT: 138 LBS | TEMPERATURE: 98.6 F | BODY MASS INDEX: 26.06 KG/M2 | RESPIRATION RATE: 16 BRPM | DIASTOLIC BLOOD PRESSURE: 65 MMHG | SYSTOLIC BLOOD PRESSURE: 136 MMHG

## 2024-05-06 DIAGNOSIS — C90.00 MULTIPLE MYELOMA NOT HAVING ACHIEVED REMISSION (H): Primary | ICD-10-CM

## 2024-05-06 DIAGNOSIS — E05.20 TOXIC MULTINODUL GOITER: ICD-10-CM

## 2024-05-06 LAB
ALBUMIN SERPL BCG-MCNC: 4 G/DL (ref 3.5–5.2)
ALP SERPL-CCNC: 84 U/L (ref 40–150)
ALT SERPL W P-5'-P-CCNC: 14 U/L (ref 0–50)
ANION GAP SERPL CALCULATED.3IONS-SCNC: 15 MMOL/L (ref 7–15)
AST SERPL W P-5'-P-CCNC: 15 U/L (ref 0–45)
BASOPHILS # BLD AUTO: 0 10E3/UL (ref 0–0.2)
BASOPHILS NFR BLD AUTO: 0 %
BILIRUB SERPL-MCNC: 0.5 MG/DL
BUN SERPL-MCNC: 24.4 MG/DL (ref 8–23)
CALCIUM SERPL-MCNC: 8.9 MG/DL (ref 8.8–10.2)
CHLORIDE SERPL-SCNC: 108 MMOL/L (ref 98–107)
CREAT SERPL-MCNC: 0.66 MG/DL (ref 0.51–0.95)
DEPRECATED HCO3 PLAS-SCNC: 22 MMOL/L (ref 22–29)
EGFRCR SERPLBLD CKD-EPI 2021: 84 ML/MIN/1.73M2
EOSINOPHIL # BLD AUTO: 0.1 10E3/UL (ref 0–0.7)
EOSINOPHIL NFR BLD AUTO: 1 %
ERYTHROCYTE [DISTWIDTH] IN BLOOD BY AUTOMATED COUNT: 15.5 % (ref 10–15)
GLUCOSE SERPL-MCNC: 102 MG/DL (ref 70–99)
HCT VFR BLD AUTO: 32.4 % (ref 35–47)
HGB BLD-MCNC: 10.5 G/DL (ref 11.7–15.7)
IMM GRANULOCYTES # BLD: 0.1 10E3/UL
IMM GRANULOCYTES NFR BLD: 1 %
LYMPHOCYTES # BLD AUTO: 1.7 10E3/UL (ref 0.8–5.3)
LYMPHOCYTES NFR BLD AUTO: 25 %
MCH RBC QN AUTO: 29.8 PG (ref 26.5–33)
MCHC RBC AUTO-ENTMCNC: 32.4 G/DL (ref 31.5–36.5)
MCV RBC AUTO: 92 FL (ref 78–100)
MONOCYTES # BLD AUTO: 0.6 10E3/UL (ref 0–1.3)
MONOCYTES NFR BLD AUTO: 9 %
NEUTROPHILS # BLD AUTO: 4.5 10E3/UL (ref 1.6–8.3)
NEUTROPHILS NFR BLD AUTO: 64 %
NRBC # BLD AUTO: 0 10E3/UL
NRBC BLD AUTO-RTO: 0 /100
PLATELET # BLD AUTO: 185 10E3/UL (ref 150–450)
POTASSIUM SERPL-SCNC: 3.6 MMOL/L (ref 3.4–5.3)
PROT SERPL-MCNC: 6.8 G/DL (ref 6.4–8.3)
RBC # BLD AUTO: 3.52 10E6/UL (ref 3.8–5.2)
SODIUM SERPL-SCNC: 145 MMOL/L (ref 135–145)
TOTAL PROTEIN SERUM FOR ELP: 6.6 G/DL (ref 6.4–8.3)
TSH SERPL DL<=0.005 MIU/L-ACNC: 0.64 UIU/ML (ref 0.3–4.2)
WBC # BLD AUTO: 7 10E3/UL (ref 4–11)

## 2024-05-06 PROCEDURE — 84165 PROTEIN E-PHORESIS SERUM: CPT | Mod: TC | Performed by: STUDENT IN AN ORGANIZED HEALTH CARE EDUCATION/TRAINING PROGRAM

## 2024-05-06 PROCEDURE — 82784 ASSAY IGA/IGD/IGG/IGM EACH: CPT | Performed by: INTERNAL MEDICINE

## 2024-05-06 PROCEDURE — 36415 COLL VENOUS BLD VENIPUNCTURE: CPT | Performed by: INTERNAL MEDICINE

## 2024-05-06 PROCEDURE — 85025 COMPLETE CBC W/AUTO DIFF WBC: CPT | Performed by: INTERNAL MEDICINE

## 2024-05-06 PROCEDURE — 84155 ASSAY OF PROTEIN SERUM: CPT | Performed by: INTERNAL MEDICINE

## 2024-05-06 PROCEDURE — 84155 ASSAY OF PROTEIN SERUM: CPT | Mod: 91 | Performed by: INTERNAL MEDICINE

## 2024-05-06 PROCEDURE — 99215 OFFICE O/P EST HI 40 MIN: CPT | Performed by: INTERNAL MEDICINE

## 2024-05-06 PROCEDURE — G0463 HOSPITAL OUTPT CLINIC VISIT: HCPCS | Mod: 25 | Performed by: INTERNAL MEDICINE

## 2024-05-06 PROCEDURE — 83521 IG LIGHT CHAINS FREE EACH: CPT | Mod: 59 | Performed by: INTERNAL MEDICINE

## 2024-05-06 PROCEDURE — 96401 CHEMO ANTI-NEOPL SQ/IM: CPT

## 2024-05-06 PROCEDURE — 84165 PROTEIN E-PHORESIS SERUM: CPT | Mod: 26 | Performed by: STUDENT IN AN ORGANIZED HEALTH CARE EDUCATION/TRAINING PROGRAM

## 2024-05-06 PROCEDURE — 84443 ASSAY THYROID STIM HORMONE: CPT | Performed by: INTERNAL MEDICINE

## 2024-05-06 PROCEDURE — 250N000011 HC RX IP 250 OP 636: Mod: JZ | Performed by: INTERNAL MEDICINE

## 2024-05-06 RX ORDER — HEPARIN SODIUM (PORCINE) LOCK FLUSH IV SOLN 100 UNIT/ML 100 UNIT/ML
5 SOLUTION INTRAVENOUS
Status: CANCELLED | OUTPATIENT
Start: 2024-05-06

## 2024-05-06 RX ORDER — LORAZEPAM 2 MG/ML
0.5 INJECTION INTRAMUSCULAR EVERY 4 HOURS PRN
Status: CANCELLED | OUTPATIENT
Start: 2024-05-06

## 2024-05-06 RX ORDER — ALBUTEROL SULFATE 90 UG/1
1-2 AEROSOL, METERED RESPIRATORY (INHALATION)
Status: CANCELLED
Start: 2024-05-06

## 2024-05-06 RX ORDER — ALBUTEROL SULFATE 0.83 MG/ML
2.5 SOLUTION RESPIRATORY (INHALATION)
Status: CANCELLED | OUTPATIENT
Start: 2024-05-06

## 2024-05-06 RX ORDER — EPINEPHRINE 1 MG/ML
0.3 INJECTION, SOLUTION INTRAMUSCULAR; SUBCUTANEOUS EVERY 5 MIN PRN
Status: CANCELLED | OUTPATIENT
Start: 2024-05-06

## 2024-05-06 RX ORDER — HEPARIN SODIUM,PORCINE 10 UNIT/ML
5 VIAL (ML) INTRAVENOUS
Status: CANCELLED | OUTPATIENT
Start: 2024-05-06

## 2024-05-06 RX ORDER — ACETAMINOPHEN 325 MG/1
650 TABLET ORAL
Status: CANCELLED | OUTPATIENT
Start: 2024-05-06

## 2024-05-06 RX ORDER — METHYLPREDNISOLONE SODIUM SUCCINATE 125 MG/2ML
125 INJECTION, POWDER, LYOPHILIZED, FOR SOLUTION INTRAMUSCULAR; INTRAVENOUS
Status: CANCELLED
Start: 2024-05-06

## 2024-05-06 RX ORDER — MEPERIDINE HYDROCHLORIDE 25 MG/ML
25 INJECTION INTRAMUSCULAR; INTRAVENOUS; SUBCUTANEOUS EVERY 30 MIN PRN
Status: CANCELLED | OUTPATIENT
Start: 2024-05-06

## 2024-05-06 RX ORDER — DIPHENHYDRAMINE HCL 25 MG
50 CAPSULE ORAL
Status: CANCELLED | OUTPATIENT
Start: 2024-05-06

## 2024-05-06 RX ORDER — DEXAMETHASONE 4 MG/1
12 TABLET ORAL
Status: CANCELLED | OUTPATIENT
Start: 2024-05-06

## 2024-05-06 RX ORDER — DIPHENHYDRAMINE HYDROCHLORIDE 50 MG/ML
50 INJECTION INTRAMUSCULAR; INTRAVENOUS
Status: CANCELLED
Start: 2024-05-06

## 2024-05-06 RX ADMIN — DARATUMUMAB AND HYALURONIDASE-FIHJ (HUMAN RECOMBINANT) 1800 MG: 1800; 30000 INJECTION SUBCUTANEOUS at 09:58

## 2024-05-06 ASSESSMENT — PAIN SCALES - GENERAL: PAINLEVEL: NO PAIN (0)

## 2024-05-06 NOTE — RESULT ENCOUNTER NOTE
The following letter pertains to your most recent diagnostic tests:    Good news! -TSH (thyroid stimulating hormone) level is normal which indicates normal circulating thyroid hormone levels.        Sincerely,    Dr. Chung

## 2024-05-06 NOTE — PROGRESS NOTES
"Oncology Rooming Note    May 6, 2024 8:37 AM   Quiana Dunaway is a 88 year old female who presents for:    Chief Complaint   Patient presents with    Oncology Clinic Visit     Initial Vitals: Resp 16   Ht 1.549 m (5' 1\")   Wt 62.6 kg (138 lb)   LMP  (LMP Unknown)   BMI 26.07 kg/m   Estimated body mass index is 26.07 kg/m  as calculated from the following:    Height as of this encounter: 1.549 m (5' 1\").    Weight as of this encounter: 62.6 kg (138 lb). Body surface area is 1.64 meters squared.  No Pain (0) Comment: Data Unavailable   No LMP recorded (lmp unknown). Patient is postmenopausal.  Allergies reviewed: Yes  Medications reviewed: Yes    Medications: Medication refills not needed today.  Pharmacy name entered into [employer]:    [facility] - [place], [place] - [address]  [id number] - [place], [place] - [address] AT [facility]    Frailty Screening:   Is the patient here for a new oncology consult visit in cancer care? 2. No        Jacquie Mcdaniel MA            "

## 2024-05-06 NOTE — PATIENT INSTRUCTIONS
Continue pomalidomid, daratumumab and dexamethasone. (Pomalidomide 2 mg a day for 2 weeks on and 1 week off.  Dexamethasone 4 mg weekly)  Resume zometa next month.  See NP in 4 weeks.  See me in 8 weeks

## 2024-05-06 NOTE — PROGRESS NOTES
Infusion Nursing Note:  Quiana PEREZ Emelyn presents today for C19D1 Darzalex Faspro.    Patient seen by provider today: Yes: Dr. Parry   present during visit today: Not Applicable.    Note: Pt seen and assessed by Dr. Parry prior to infusion. Okay to proceed with treatment today. Med administered in  L lower abdomen.       Intravenous Access:  No Intravenous access/labs at this visit.    Treatment Conditions:  Lab Results   Component Value Date    HGB 10.5 (L) 05/06/2024    WBC 7.0 05/06/2024    ANEU 1.6 02/20/2024    ANEUTAUTO 4.5 05/06/2024     05/06/2024        Results reviewed, labs MET treatment parameters, ok to proceed with treatment.      Post Infusion Assessment:  Patient tolerated injection without incident.  Site patent and intact, free from redness, edema or discomfort.  Access discontinued per protocol.       Discharge Plan:   Patient declined prescription refills.  Discharge instructions reviewed with: Patient.  Patient and/or family verbalized understanding of discharge instructions and all questions answered.  AVS to patient via [employer].  Patient will return in 4 weeks for next appointment.   Patient discharged in stable condition accompanied by: daughter-in-law.  Departure Mode: Ambulatory+ walker.      Aide Good, RN

## 2024-05-06 NOTE — PROGRESS NOTES
HEMATOLOGY HISTORY: Mrs. Dunaway is a lady with multiple myeloma (IgG kappa). High risk, t(14;16).  1. On 05/01/2017, WBC of 3.4, hemoglobin of 10.2 and platelet of 154.  2. SPEP on 07/07/2017 revealed M-spike of 1.8.  3. Bone marrow biopsy on 07/12/2017 reveals kappa monotypic plasma cell population consistent with multiple myeloma.  Bone marrow is 70% cellular.  Plasma cell is 50-60%.     -There is gain of chromosome 1, 5, 9, 15, and 17.  There is translocation 14;16.   4.  On 07/18/2017:  -M-spike of 1.9.   -Immunofixation reveals monoclonal IgG kappa.    -IgG level of 2970.  IgA of 15 and IgM of 175.    -Kappa free light chain of 67.7.  Lambda free light chain of 1.42.  Ratio of kappa to lambda of 47.71.    -Beta 2 microglobulin of 3.4.   5. PET scan on 07/24/2017 reveals several focal areas of increased FDG uptake consistent with multiple myeloma.  There is probable epithelial cyst in tail of the pancreas.  No associated abnormal FDG activity.  Left thyroid cysts or nodules without any FDG activity.  There is a 0.3 cm left upper lobe lung nodule.   6.  Velcade, Revlimid and dexamethasone between on 08/14/2017 and 04/30/2018. Velcade stopped.   -Revlimid and dexamethasone continued.  -Revlimid held between 12/27/2021 and 03/08/2022.  -Daratumumab added on 11/14/2022.  -Changed to daratumumab, pomalidomide and dexamethasone on 06/12/2023.  7. CT chest, abdomen, and pelvis on 12/30/2021 does not reveal any malignancy.  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     SUBJECTIVE:  Ms. Dunaway is an 88-year-old female with multiple myeloma on daratumumab, pomalidomide and dexamethasone.  She is on pomalidomide 2 mg a day for 2 weeks on and 1 week off.  Dose reduction due to side effects.  She takes dexamethasone 4 mg weekly.     Zometa has been on hold for dental work. She saw dentist recently. No infection. No dental work planned.     She has mild generalized weakness secondary to  myeloma, her age and medications. She has arthritic joint pain. No worsening of pain.      She has chronic dizziness.  No worsening of dizziness. No headache.  No chest pain.  No shortness of breath.  No abdominal pain. No nausea. No vomiting.  No urinary complaint.  She gets intermittent diarrhea.  No bleeding.  No fever or night sweats.  All other review of system is negative.      PHYSICAL EXAMINATION:   GENERAL:  Alert and oriented x 3.  Not in distress.  VITAL SIGNS:  Reviewed.       EYES:  No icterus.   NECK:  Supple. No lymphadenopathy. No thyromegaly.   AXILLAE:  No lymphadenopathy.   LUNGS:  Good air entry bilaterally.  No crackles or wheezing.   HEART:  Regular.  No murmur.   GI: Abdomen is soft.  Nontender. No mass. Difficult palpation because of her weight.  EXTREMITIES:  No pedal edema.  No calf swelling or tenderness.   SKIN:  No rash.     LABORATORY: CBC and CMP reviewed.      ASSESSMENT:    1.  An 88-year-old female with multiple myeloma on daratumumab, pomalidomide and dexamethasone.   2.  Mild normocytic anemia from myeloma and its treatment. Anemia is stable.  3.  Chronic dizziness.  4.  Generalized weakness from myeloma, anemia, medication and old age.  5.  Arthritic joint pain.      PLAN:  Continue pomalidomid, daratumumab and dexamethasone.  See NP in 4 weeks.  See me in 8 weeks  Continue prophylactic aspirin and acyclovir     DISCUSSION:  1.  Patient is overall doing well.  No excessive fatigue.  She is not in any severe pain.  She is eating good.    Discussed with her regarding myeloma.  Labs done on 04/02/2024 reviewed.  Explained to her that kappa free light chain had increased.    [place] free light chain from today is pending.  It will back in next few days.  If it increases, will then discuss regarding changing treatment.  Will inform her of the result.    For now, patient will continue on the current treatment with daratumumab, pomalidomide and dexamethasone.  She is on reduced dose of  pomalidomide due to side effects.  She is tolerating it well.    2.  Zometa has been on hold for dental workup.  Patient recently saw dentist.  No infection.  No dental work planned.  Patient has been recommended tooth extraction.  She does not plan to have it done.    As no dental workup is planned, will resume Zometa.  She wants it with next daratumumab.    3.  She will continue on prophylactic aspirin and acyclovir.    4.  Patient has chronic dizziness.  Advised her to be careful.  She uses walker.    5.  She and her daughter had a few questions all answered.  I will see her in 2 months time.  In between she will see our ROSA MARIA.     TOTAL VISIT TIME: 40 minutes.  Time spent in today's visit, review of chart/investigations today, monitoring for toxicity of high risk medications and documentation today.

## 2024-05-06 NOTE — LETTER
"    5/6/2024         RE: Quiana Dunaway  [address] [facility] [address]        Dear Colleague,    Thank you for referring your patient, Quiana Dunaway, to the [facility]. Please see a copy of my visit note below.    Oncology Rooming Note    May 6, 2024 8:37 AM   Quiana Dunaway is a 88 year old female who presents for:    Chief Complaint   Patient presents with     Oncology Clinic Visit     Initial Vitals: Resp 16   Ht 1.549 m (5' 1\")   Wt 62.6 kg (138 lb)   LMP  (LMP Unknown)   BMI 26.07 kg/m   Estimated body mass index is 26.07 kg/m  as calculated from the following:    Height as of this encounter: 1.549 m (5' 1\").    Weight as of this encounter: 62.6 kg (138 lb). Body surface area is 1.64 meters squared.  No Pain (0) Comment: Data Unavailable   No LMP recorded (lmp unknown). Patient is postmenopausal.  Allergies reviewed: Yes  Medications reviewed: Yes    Medications: Medication refills not needed today.  Pharmacy name entered into [employer]:    [facility] - [place], [place] - [address]  [id number] - [place], [place] - [address] AT [address]    Frailty Screening:   Is the patient here for a new oncology consult visit in cancer care? 2. No        Jacquie Mcdaniel MA              HEMATOLOGY HISTORY: Mrs. Dunaway is a lady with multiple myeloma (IgG kappa). High risk, t(14;16).  1. On 05/01/2017, WBC of 3.4, hemoglobin of 10.2 and platelet of 154.  2. SPEP on 07/07/2017 revealed M-spike of 1.8.  3. Bone marrow biopsy on 07/12/2017 reveals kappa monotypic plasma cell population consistent with multiple myeloma.  Bone marrow is 70% cellular.  Plasma cell is 50-60%.     -There is gain of chromosome 1, 5, 9, 15, and 17.  There is translocation 14;16.   4.  On 07/18/2017:  -M-spike of 1.9.   -Immunofixation reveals monoclonal IgG kappa.    -IgG level of 2970.  IgA of 15 and IgM of 175.    -Kappa free " light chain of 67.7.  Lambda free light chain of 1.42.  Ratio of kappa to lambda of 47.71.    -Beta 2 microglobulin of 3.4.   5. PET scan on 07/24/2017 reveals several focal areas of increased FDG uptake consistent with multiple myeloma.  There is probable epithelial cyst in tail of the pancreas.  No associated abnormal FDG activity.  Left thyroid cysts or nodules without any FDG activity.  There is a 0.3 cm left upper lobe lung nodule.   6.  Velcade, Revlimid and dexamethasone between on 08/14/2017 and 04/30/2018. Velcade stopped.   -Revlimid and dexamethasone continued.  -Revlimid held between 12/27/2021 and 03/08/2022.  -Daratumumab added on 11/14/2022.  -Changed to daratumumab, pomalidomide and dexamethasone on 06/12/2023.  7. CT chest, abdomen, and pelvis on 12/30/2021 does not reveal any malignancy.  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     SUBJECTIVE:  Ms. Dunaway is an 88-year-old female with multiple myeloma on daratumumab, pomalidomide and dexamethasone.  She is on pomalidomide 2 mg a day for 2 weeks on and 1 week off.  Dose reduction due to side effects.  She takes dexamethasone 4 mg weekly.     Zometa has been on hold for dental work. She saw dentist recently. No infection. No dental work planned.     She has mild generalized weakness secondary to myeloma, her age and medications. She has arthritic joint pain. No worsening of pain.      She has chronic dizziness.  No worsening of dizziness. No headache.  No chest pain.  No shortness of breath.  No abdominal pain. No nausea. No vomiting.  No urinary complaint.  She gets intermittent diarrhea.  No bleeding.  No fever or night sweats.  All other review of system is negative.      PHYSICAL EXAMINATION:   GENERAL:  Alert and oriented x 3.  Not in distress.  VITAL SIGNS:  Reviewed.       EYES:  No icterus.   NECK:  Supple. No lymphadenopathy. No thyromegaly.   AXILLAE:  No lymphadenopathy.   LUNGS:  Good air entry  bilaterally.  No crackles or wheezing.   HEART:  Regular.  No murmur.   GI: Abdomen is soft.  Nontender. No mass. Difficult palpation because of her weight.  EXTREMITIES:  No pedal edema.  No calf swelling or tenderness.   SKIN:  No rash.     LABORATORY: CBC and CMP reviewed.      ASSESSMENT:    1.  An 88-year-old female with multiple myeloma on daratumumab, pomalidomide and dexamethasone.   2.  Mild normocytic anemia from myeloma and its treatment. Anemia is stable.  3.  Chronic dizziness.  4.  Generalized weakness from myeloma, anemia, medication and old age.  5.  Arthritic joint pain.      PLAN:  Continue pomalidomid, daratumumab and dexamethasone.  See NP in 4 weeks.  See me in 8 weeks  Continue prophylactic aspirin and acyclovir     DISCUSSION:  1.  Patient is overall doing well.  No excessive fatigue.  She is not in any severe pain.  She is eating good.    Discussed with her regarding myeloma.  Labs done on 04/02/2024 reviewed.  Explained to her that kappa free light chain had increased.    [place] free light chain from today is pending.  It will back in next few days.  If it increases, will then discuss regarding changing treatment.  Will inform her of the result.    For now, patient will continue on the current treatment with daratumumab, pomalidomide and dexamethasone.  She is on reduced dose of pomalidomide due to side effects.  She is tolerating it well.    2.  Zometa has been on hold for dental workup.  Patient recently saw dentist.  No infection.  No dental work planned.  Patient has been recommended tooth extraction.  She does not plan to have it done.    As no dental workup is planned, will resume Zometa.  She wants it with next daratumumab.    3.  She will continue on prophylactic aspirin and acyclovir.    4.  Patient has chronic dizziness.  Advised her to be careful.  She uses walker.    5.  She and her daughter had a few questions all answered.  I will see her in 2 months time.  In between she will  see our ROSA MARIA.     TOTAL VISIT TIME: 40 minutes.  Time spent in today's visit, review of chart/investigations today, monitoring for toxicity of high risk medications and documentation today.         Again, thank you for allowing me to participate in the care of your patient.        Sincerely,        Alex Parry MD

## 2024-05-07 LAB
ALBUMIN SERPL ELPH-MCNC: 4 G/DL (ref 3.7–5.1)
ALPHA1 GLOB SERPL ELPH-MCNC: 0.3 G/DL (ref 0.2–0.4)
ALPHA2 GLOB SERPL ELPH-MCNC: 0.6 G/DL (ref 0.5–0.9)
B-GLOBULIN SERPL ELPH-MCNC: 0.6 G/DL (ref 0.6–1)
GAMMA GLOB SERPL ELPH-MCNC: 1.1 G/DL (ref 0.7–1.6)
IGG SERPL-MCNC: 1226 MG/DL (ref 610–1616)
KAPPA LC FREE SER-MCNC: 51.08 MG/DL (ref 0.33–1.94)
KAPPA LC FREE/LAMBDA FREE SER NEPH: 145.94 {RATIO} (ref 0.26–1.65)
LAMBDA LC FREE SERPL-MCNC: 0.35 MG/DL (ref 0.57–2.63)
M PROTEIN SERPL ELPH-MCNC: 1 G/DL
PATH REPORT.COMMENTS IMP SPEC: ABNORMAL
PROT PATTERN SERPL ELPH-IMP: ABNORMAL

## 2024-05-08 NOTE — RESULT ENCOUNTER NOTE
Dear Ms. Dunaway,    [place] free light chain is better at 51.08.  It was 69.89 last time.  Will continue the same treatment.    Please, call me with any questions.    Alex Parry MD

## 2024-05-14 ENCOUNTER — TELEPHONE (OUTPATIENT)
Dept: ONCOLOGY | Facility: CLINIC | Age: 89
End: 2024-05-14
Payer: MEDICARE

## 2024-05-14 ENCOUNTER — TELEPHONE (OUTPATIENT)
Dept: PHARMACY | Facility: CLINIC | Age: 89
End: 2024-05-14
Payer: MEDICARE

## 2024-05-14 NOTE — TELEPHONE ENCOUNTER
Prior Authorization Approval    Medication: POMALYST 2 MG PO CAPS  Authorization Effective Date: 2/14/2024  Authorization Expiration Date: 5/14/2025  Approved Dose/Quantity: 14/21  Reference #: [id number]   Insurance Company: [employer] - Phone [phone] Fax [phone]  Expected CoPay: $    CoPay Card Available:      Financial Assistance Needed: N/A - Renewal  Which Pharmacy is filling the prescription:    Pharmacy Notified: N/A  Patient Notified: N/A    Thank you,  Kim Cadet Oncology Liaison  Phone: [phone]  Fax: [phone]

## 2024-05-14 NOTE — TELEPHONE ENCOUNTER
Pt's daughter-in-law, Regi, is calling.  States that pt was seen at the dentist about one month ago with tooth pain.  Pt did not want to have tooth extracted at that time, and she has been continuing to monitor.  Pt's daughter in law reports that the tooth pain has become severe and pt has decided that she wants to have the tooth pulled. This has been scheduled for tomorrow at 12:30 pm. Denies any fever in pt.     Per chart review, pt received C19D1 Darzalex Faspro  on 5/6/2024.  Zometa has been on hold for dental workup. Had planned to resume zometa next month.     Will check with Dr Parry to see if OK for pt to proceed with tooth extraction tomorrow.  If yes, does pt need to be on any antibiotics?  Pt uses [facility].    Ok to leave message if unable to reach pt's daughter in law.    Riri Zazueta RN on 5/14/2024 at 11:40 AM

## 2024-05-14 NOTE — TELEPHONE ENCOUNTER
PA Initiation    Medication: POMALYST 2 MG PO CAPS  Insurance Company: [employer] - Phone [phone] Fax [phone]  Start Date: 5/14/2024

## 2024-05-14 NOTE — TELEPHONE ENCOUNTER
Alex Parry MD  You; Alecia Briggs RN3 minutes ago (4:04 PM)     BK  Okay for dental extraction from my side.    Alex Parry MD     Pt's daughter in law was notified with recommendations per Dr Parry.   She verbalized understanding and agreement with plan.  She was instructed to call the clinic with any questions, concerns, or worsening symptoms.    Riri Zazueta RN on 5/14/2024 at 4:10 PM

## 2024-05-16 RX ORDER — DEXAMETHASONE 4 MG/1
4 TABLET ORAL WEEKLY
Qty: 16 TABLET | Refills: 2 | Status: SHIPPED | OUTPATIENT
Start: 2024-05-16

## 2024-05-29 ENCOUNTER — TELEPHONE (OUTPATIENT)
Dept: ONCOLOGY | Facility: CLINIC | Age: 89
End: 2024-05-29
Payer: MEDICARE

## 2024-05-29 NOTE — ORAL ONC MGMT
Oral Chemotherapy Monitoring Program     Medication: Pomalyst  Rx: 2mg PO daily on days 1 through 14 of 21 day cycle   [facility] Auth # [id number]  Routine survey questions reviewed.   Rx to be Escribed to [facility]

## 2024-05-31 DIAGNOSIS — C90.00 MULTIPLE MYELOMA NOT HAVING ACHIEVED REMISSION (H): Primary | ICD-10-CM

## 2024-06-03 ENCOUNTER — OFFICE VISIT (OUTPATIENT)
Dept: FAMILY MEDICINE | Facility: CLINIC | Age: 89
End: 2024-06-03
Payer: MEDICARE

## 2024-06-03 ENCOUNTER — APPOINTMENT (OUTPATIENT)
Dept: LAB | Facility: CLINIC | Age: 89
End: 2024-06-03
Payer: MEDICARE

## 2024-06-03 VITALS
WEIGHT: 143 LBS | DIASTOLIC BLOOD PRESSURE: 86 MMHG | TEMPERATURE: 98 F | BODY MASS INDEX: 27 KG/M2 | RESPIRATION RATE: 16 BRPM | OXYGEN SATURATION: 96 % | HEART RATE: 88 BPM | HEIGHT: 61 IN | SYSTOLIC BLOOD PRESSURE: 149 MMHG

## 2024-06-03 DIAGNOSIS — I50.32 CHRONIC DIASTOLIC CONGESTIVE HEART FAILURE (H): Primary | ICD-10-CM

## 2024-06-03 DIAGNOSIS — C90.00 MULTIPLE MYELOMA NOT HAVING ACHIEVED REMISSION (H): ICD-10-CM

## 2024-06-03 DIAGNOSIS — I10 BENIGN ESSENTIAL HYPERTENSION: ICD-10-CM

## 2024-06-03 DIAGNOSIS — K02.9 DENTAL CARIES: ICD-10-CM

## 2024-06-03 LAB
BASOPHILS # BLD AUTO: ABNORMAL 10*3/UL
BASOPHILS # BLD MANUAL: 0.1 10E3/UL (ref 0–0.2)
BASOPHILS NFR BLD AUTO: ABNORMAL %
BASOPHILS NFR BLD MANUAL: 1 %
DACRYOCYTES BLD QL SMEAR: SLIGHT
ELLIPTOCYTES BLD QL SMEAR: SLIGHT
EOSINOPHIL # BLD AUTO: ABNORMAL 10*3/UL
EOSINOPHIL # BLD MANUAL: 0.1 10E3/UL (ref 0–0.7)
EOSINOPHIL NFR BLD AUTO: ABNORMAL %
EOSINOPHIL NFR BLD MANUAL: 1 %
ERYTHROCYTE [DISTWIDTH] IN BLOOD BY AUTOMATED COUNT: 15 % (ref 10–15)
HCT VFR BLD AUTO: 32.9 % (ref 35–47)
HGB BLD-MCNC: 10.7 G/DL (ref 11.7–15.7)
IMM GRANULOCYTES # BLD: ABNORMAL 10*3/UL
IMM GRANULOCYTES NFR BLD: ABNORMAL %
LYMPHOCYTES # BLD AUTO: ABNORMAL 10*3/UL
LYMPHOCYTES # BLD MANUAL: 2.5 10E3/UL (ref 0.8–5.3)
LYMPHOCYTES NFR BLD AUTO: ABNORMAL %
LYMPHOCYTES NFR BLD MANUAL: 49 %
MCH RBC QN AUTO: 29.9 PG (ref 26.5–33)
MCHC RBC AUTO-ENTMCNC: 32.5 G/DL (ref 31.5–36.5)
MCV RBC AUTO: 92 FL (ref 78–100)
MONOCYTES # BLD AUTO: ABNORMAL 10*3/UL
MONOCYTES # BLD MANUAL: 0.4 10E3/UL (ref 0–1.3)
MONOCYTES NFR BLD AUTO: ABNORMAL %
MONOCYTES NFR BLD MANUAL: 8 %
NEUTROPHILS # BLD AUTO: ABNORMAL 10*3/UL
NEUTROPHILS # BLD MANUAL: 2.1 10E3/UL (ref 1.6–8.3)
NEUTROPHILS NFR BLD AUTO: ABNORMAL %
NEUTROPHILS NFR BLD MANUAL: 41 %
NRBC # BLD AUTO: 0 10E3/UL
NRBC BLD AUTO-RTO: 0 /100
PLAT MORPH BLD: ABNORMAL
PLATELET # BLD AUTO: 130 10E3/UL (ref 150–450)
RBC # BLD AUTO: 3.58 10E6/UL (ref 3.8–5.2)
RBC MORPH BLD: ABNORMAL
WBC # BLD AUTO: 5.1 10E3/UL (ref 4–11)

## 2024-06-03 PROCEDURE — 84155 ASSAY OF PROTEIN SERUM: CPT | Performed by: INTERNAL MEDICINE

## 2024-06-03 PROCEDURE — 99213 OFFICE O/P EST LOW 20 MIN: CPT | Performed by: INTERNAL MEDICINE

## 2024-06-03 PROCEDURE — 85027 COMPLETE CBC AUTOMATED: CPT | Performed by: INTERNAL MEDICINE

## 2024-06-03 PROCEDURE — 85007 BL SMEAR W/DIFF WBC COUNT: CPT | Performed by: INTERNAL MEDICINE

## 2024-06-03 PROCEDURE — 80053 COMPREHEN METABOLIC PANEL: CPT | Performed by: INTERNAL MEDICINE

## 2024-06-03 PROCEDURE — 82784 ASSAY IGA/IGD/IGG/IGM EACH: CPT | Performed by: INTERNAL MEDICINE

## 2024-06-03 PROCEDURE — 84165 PROTEIN E-PHORESIS SERUM: CPT | Performed by: PATHOLOGY

## 2024-06-03 PROCEDURE — 36415 COLL VENOUS BLD VENIPUNCTURE: CPT | Performed by: INTERNAL MEDICINE

## 2024-06-03 PROCEDURE — 83521 IG LIGHT CHAINS FREE EACH: CPT | Performed by: INTERNAL MEDICINE

## 2024-06-03 RX ORDER — AMOXICILLIN 500 MG/1
CAPSULE ORAL
COMMUNITY
Start: 2024-05-31 | End: 2024-09-03

## 2024-06-03 ASSESSMENT — PAIN SCALES - GENERAL: PAINLEVEL: NO PAIN (0)

## 2024-06-03 NOTE — PROGRESS NOTES
Assessment & Plan     Chronic diastolic congestive heart failure (H)  We discussed the relationship between diet sodium and volume status and the use of intermittent furosemide for which she already has a prescription to help with any excess volume that might have resulted from diet indiscretions.  I recommended that she elevate her legs this afternoon and evening and if tomorrow morning her legs are swollen, she could use furosemide as a one-time dose to manage that.  We discussed the role of compression hosiery, but she does not want to wear them.    Benign essential hypertension  I suspect that her blood pressure might be up a little bit this afternoon because of a slightly decompensated volume state.  Taking the furosemide might improve this, I do not think we need to make any changes to her chronic antihypertensive regimen today.    Dental caries  I think she should proceed with her dentist recommendations, if she has her front lower teeth removed, she might be a good candidate for using dentures    Multiple myeloma not having achieved remission (H)  Continue follow-up with oncology as directed        FUTURE APPOINTMENTS:       -Follow-up in 6 weeks or sooner as needed    Dirk Araya is a 88 year old, presenting for the following health issues:  Follow Up (Mouth pain)        6/3/2024     2:31 PM   Additional Questions   Roomed by Omar   Accompanied by daughter     History of Present Illness       Hypertension: She presents for follow up of hypertension.  She does check blood pressure  regularly outside of the clinic. Outside blood pressures have been over 140/90. She follows a low salt diet.     She eats 0-1 servings of fruits and vegetables daily.She consumes 1 sweetened beverage(s) daily.She exercises with enough effort to increase her heart rate 9 or less minutes per day.  She exercises with enough effort to increase her heart rate 3 or less days per week.   She is taking medications  "regularly.     Wt Readings from Last 4 Encounters:   06/03/24 64.9 kg (143 lb)   05/06/24 62.6 kg (138 lb)   05/01/24 64.2 kg (141 lb 8 oz)   03/19/24 64 kg (141 lb)       Very pleasant 88-year-old female with multiple myeloma undergoing chemotherapy, hypertension, chronic diastolic heart failure, hyperthyroidism, anxiety    She desires frequent follow-up appointments for reassurance    So far she is tolerating the myeloma treatments with oncology    She needs labs drawn today because there is a problem with the hospital lab tomorrow    Her recent TSH was normal    She occasionally has bilateral leg swelling, but she has not had swelling severe enough to warrant using furosemide    She is troubled because her dentist suggest that she needs multiple tooth extractions and asked for some guidance regarding this              Objective    BP (!) 149/86   Pulse 88   Temp 98  F (36.7  C) (Oral)   Resp 16   Ht 1.549 m (5' 1\")   Wt 64.9 kg (143 lb)   LMP  (LMP Unknown)   SpO2 96%   BMI 27.02 kg/m    Body mass index is 27.02 kg/m .  Physical Exam   GENERAL: alert and no distress  HENT: She is a dentulous with the exception of 6 lower front teeth   RESP: lungs clear to auscultation - no rales, rhonchi or wheezes  CV: Heart with regular rate and rhythm.   MS: There is trace bilateral lower extremity edema  NEURO: Normal strength and tone, mentation intact and speech normal  PSYCH: mentation appears normal, affect normal/bright            Signed Electronically by: César Chung MD    "

## 2024-06-04 ENCOUNTER — ONCOLOGY VISIT (OUTPATIENT)
Dept: ONCOLOGY | Facility: CLINIC | Age: 89
End: 2024-06-04
Attending: INTERNAL MEDICINE
Payer: MEDICARE

## 2024-06-04 ENCOUNTER — INFUSION THERAPY VISIT (OUTPATIENT)
Dept: INFUSION THERAPY | Facility: CLINIC | Age: 89
End: 2024-06-04
Attending: INTERNAL MEDICINE
Payer: MEDICARE

## 2024-06-04 VITALS — HEART RATE: 57 BPM | DIASTOLIC BLOOD PRESSURE: 84 MMHG | SYSTOLIC BLOOD PRESSURE: 156 MMHG

## 2024-06-04 VITALS
RESPIRATION RATE: 16 BRPM | TEMPERATURE: 98.2 F | BODY MASS INDEX: 27.02 KG/M2 | WEIGHT: 143 LBS | OXYGEN SATURATION: 94 % | SYSTOLIC BLOOD PRESSURE: 171 MMHG | DIASTOLIC BLOOD PRESSURE: 93 MMHG | HEART RATE: 91 BPM

## 2024-06-04 DIAGNOSIS — N32.81 OVERACTIVE BLADDER: ICD-10-CM

## 2024-06-04 DIAGNOSIS — C90.00 MULTIPLE MYELOMA NOT HAVING ACHIEVED REMISSION (H): Primary | ICD-10-CM

## 2024-06-04 DIAGNOSIS — I50.32 CHRONIC DIASTOLIC CONGESTIVE HEART FAILURE (H): ICD-10-CM

## 2024-06-04 LAB
ALBUMIN SERPL BCG-MCNC: 4.1 G/DL (ref 3.5–5.2)
ALP SERPL-CCNC: 91 U/L (ref 40–150)
ALT SERPL W P-5'-P-CCNC: 9 U/L (ref 0–50)
ANION GAP SERPL CALCULATED.3IONS-SCNC: 11 MMOL/L (ref 7–15)
AST SERPL W P-5'-P-CCNC: 14 U/L (ref 0–45)
BILIRUB SERPL-MCNC: 0.3 MG/DL
BUN SERPL-MCNC: 27.7 MG/DL (ref 8–23)
CALCIUM SERPL-MCNC: 9 MG/DL (ref 8.8–10.2)
CHLORIDE SERPL-SCNC: 105 MMOL/L (ref 98–107)
CREAT SERPL-MCNC: 0.67 MG/DL (ref 0.51–0.95)
DEPRECATED HCO3 PLAS-SCNC: 24 MMOL/L (ref 22–29)
EGFRCR SERPLBLD CKD-EPI 2021: 84 ML/MIN/1.73M2
GLUCOSE SERPL-MCNC: 91 MG/DL (ref 70–99)
IGG SERPL-MCNC: 975 MG/DL (ref 610–1616)
KAPPA LC FREE SER-MCNC: 39.67 MG/DL (ref 0.33–1.94)
KAPPA LC FREE/LAMBDA FREE SER NEPH: 76.29 {RATIO} (ref 0.26–1.65)
LAMBDA LC FREE SERPL-MCNC: 0.52 MG/DL (ref 0.57–2.63)
POTASSIUM SERPL-SCNC: 3.6 MMOL/L (ref 3.4–5.3)
PROT SERPL-MCNC: 6.8 G/DL (ref 6.4–8.3)
SODIUM SERPL-SCNC: 140 MMOL/L (ref 135–145)
TOTAL PROTEIN SERUM FOR ELP: 6.4 G/DL (ref 6.4–8.3)

## 2024-06-04 PROCEDURE — G0463 HOSPITAL OUTPT CLINIC VISIT: HCPCS | Performed by: NURSE PRACTITIONER

## 2024-06-04 PROCEDURE — 99214 OFFICE O/P EST MOD 30 MIN: CPT | Performed by: NURSE PRACTITIONER

## 2024-06-04 PROCEDURE — 96401 CHEMO ANTI-NEOPL SQ/IM: CPT

## 2024-06-04 PROCEDURE — 250N000011 HC RX IP 250 OP 636: Mod: JZ | Performed by: NURSE PRACTITIONER

## 2024-06-04 RX ORDER — METHYLPREDNISOLONE SODIUM SUCCINATE 125 MG/2ML
125 INJECTION, POWDER, LYOPHILIZED, FOR SOLUTION INTRAMUSCULAR; INTRAVENOUS
Status: CANCELLED
Start: 2024-06-04

## 2024-06-04 RX ORDER — LORAZEPAM 2 MG/ML
0.5 INJECTION INTRAMUSCULAR EVERY 4 HOURS PRN
Status: CANCELLED | OUTPATIENT
Start: 2024-06-04

## 2024-06-04 RX ORDER — ALBUTEROL SULFATE 0.83 MG/ML
2.5 SOLUTION RESPIRATORY (INHALATION)
Status: CANCELLED | OUTPATIENT
Start: 2024-06-04

## 2024-06-04 RX ORDER — DIPHENHYDRAMINE HCL 25 MG
50 CAPSULE ORAL
Status: CANCELLED | OUTPATIENT
Start: 2024-06-04

## 2024-06-04 RX ORDER — HEPARIN SODIUM (PORCINE) LOCK FLUSH IV SOLN 100 UNIT/ML 100 UNIT/ML
5 SOLUTION INTRAVENOUS
Status: CANCELLED | OUTPATIENT
Start: 2024-06-04

## 2024-06-04 RX ORDER — POTASSIUM CHLORIDE 750 MG/1
10 TABLET, EXTENDED RELEASE ORAL DAILY
Qty: 90 TABLET | Refills: 3 | Status: SHIPPED | OUTPATIENT
Start: 2024-06-04

## 2024-06-04 RX ORDER — EPINEPHRINE 1 MG/ML
0.3 INJECTION, SOLUTION INTRAMUSCULAR; SUBCUTANEOUS EVERY 5 MIN PRN
Status: CANCELLED | OUTPATIENT
Start: 2024-06-04

## 2024-06-04 RX ORDER — DIPHENHYDRAMINE HYDROCHLORIDE 50 MG/ML
50 INJECTION INTRAMUSCULAR; INTRAVENOUS
Status: CANCELLED
Start: 2024-06-04

## 2024-06-04 RX ORDER — MEPERIDINE HYDROCHLORIDE 25 MG/ML
25 INJECTION INTRAMUSCULAR; INTRAVENOUS; SUBCUTANEOUS EVERY 30 MIN PRN
Status: CANCELLED | OUTPATIENT
Start: 2024-06-04

## 2024-06-04 RX ORDER — HEPARIN SODIUM,PORCINE 10 UNIT/ML
5 VIAL (ML) INTRAVENOUS
Status: CANCELLED | OUTPATIENT
Start: 2024-06-04

## 2024-06-04 RX ORDER — ALBUTEROL SULFATE 90 UG/1
1-2 AEROSOL, METERED RESPIRATORY (INHALATION)
Status: CANCELLED
Start: 2024-06-04

## 2024-06-04 RX ORDER — ACETAMINOPHEN 325 MG/1
650 TABLET ORAL
Status: CANCELLED | OUTPATIENT
Start: 2024-06-04

## 2024-06-04 RX ORDER — DEXAMETHASONE 4 MG/1
12 TABLET ORAL
Status: CANCELLED | OUTPATIENT
Start: 2024-06-04

## 2024-06-04 RX ADMIN — DARATUMUMAB AND HYALURONIDASE-FIHJ (HUMAN RECOMBINANT) 1800 MG: 1800; 30000 INJECTION SUBCUTANEOUS at 14:38

## 2024-06-04 ASSESSMENT — PAIN SCALES - GENERAL: PAINLEVEL: NO PAIN (0)

## 2024-06-04 NOTE — LETTER
"6/4/2024      Quiana Dunaway  [address] [facility] [address]      Dear Colleague,    Thank you for referring your patient, Quiana Dunaway, to the [facility]. Please see a copy of my visit note below.    Oncology Rooming Note    June 4, 2024 1:33 PM   Quiana Dunaway is a 88 year old female who presents for:    Chief Complaint   Patient presents with     Oncology Clinic Visit     Initial Vitals: BP (!) 171/93   Pulse 91   Temp 98.2  F (36.8  C) (Oral)   Resp 16   Wt 64.9 kg (143 lb)   LMP  (LMP Unknown)   SpO2 94%   BMI 27.02 kg/m   Estimated body mass index is 27.02 kg/m  as calculated from the following:    Height as of 6/3/24: 1.549 m (5' 1\").    Weight as of this encounter: 64.9 kg (143 lb). Body surface area is 1.67 meters squared.  No Pain (0) Comment: Data Unavailable   No LMP recorded (lmp unknown). Patient is postmenopausal.  Allergies reviewed: Yes  Medications reviewed: Yes    Medications: Medication refills not needed today.  Pharmacy name entered into [employer]:    [facility] - [place], [place] - [address]  [id number] - [place], [place] - [address] [facility] [address] AT [facility]    Frailty Screening:   Is the patient here for a new oncology consult visit in cancer care? 2. No      Clinical concerns:   pa was notified.  States of being dizzy    Mariela Lyn CMA              Oncology/Hematology Visit Note  Jun 4, 2024    Reason for Visit: follow up of multiple myeloma IgG kappa high risk  Diagnosed 2017  Follows Dr. Parry  -08/14/2017 -04/30/2018: Velcade, Revlimid and dexamethasone Velcade stopped. Revlimid and dexamethasone continued.  12/30/2024: CT chest, abdomen, and pelvis does not reveal any malignancy.  -Revlimid held between 12/27/2021 and 03/08/2022.  -Daratumumab added on 11/14/2022.  03/13/2023- patient diagnosed with COVID her PCP prescribed molnupiravir.  Which she completed  -We held " treatment  -Changed to daratumumab, pomalidomide and dexamethasone on 06/12/2023.  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  Patient is  currently undergoing treatment with daratumumab pomalidomide and dexamethasone. Due to the side effects Pomalyst reduced to 2 mg 2 weeks on 1 week off  -6/4/2024: current treatment: Pomalyst 2 mg (14 days on, 1 week off), daratumumab monthly, and dexamethasone 4 mg weekly        Interval History:  Ms. Dunaway reports to clinic today for D1 Cycle 20 of Pomalyst, daratumumab and dexamethasone.  She is tolerating treatment well. She last took Pomalyst on Sunday, she is on her week break.     She is anxious about upcoming tooth extractions, scheduled for 7/8/2024. Had one tooth pulled in May 2024. Requesting if possible to hold treatment after extraction.     She has occasional loose stools that she manages with Imodium.    She is having frequency of urination, previously seen by Urology and prescribed Myrbetriq. Denies burning upon urination, irritation,  flank pain or hematuria.  Denies urinary incontinence.    Chronic dizziness, taking Meclizine daily.    Denies fever, chills or sweats. Denies chest pain, cough or shortness of breath. Denies abdominal pain, n/v.    Review of Systems:  14 point ROS of systems including Constitutional, Eyes, Respiratory, Cardiovascular, Gastroenterology, Genitourinary, Integumentary, Muscularskeletal, Psychiatric were all negative except for pertinent positives noted in my HPI.    Physical Examination:  Physical Exam  HENT:      Head: Normocephalic.      Right Ear: Tympanic membrane normal.      Nose: Nose normal.      Mouth/Throat:      Mouth: Mucous membranes are moist.   Eyes:      Pupils: Pupils are equal, round, and reactive to light.   Cardiovascular:      Rate and Rhythm: Normal rate.      Comments: Paced rhythm  Pulmonary:      Effort: Pulmonary effort is normal.      Breath sounds: Normal breath sounds.    Abdominal:      General: Abdomen is flat. Bowel sounds are normal.      Tenderness: There is no abdominal tenderness. There is no right CVA tenderness, left CVA tenderness, guarding or rebound.   Musculoskeletal:      Cervical back: Normal range of motion.   Skin:     General: Skin is warm.   Neurological:      General: No focal deficit present.      Mental Status: She is alert.      Comments: Ambulates with a walker   Psychiatric:         Mood and Affect: Mood normal.       LABS  -CBC, CMP labs reviewed  MM labs pending        6/4/2024  1:32 PM 6/4/2024  2:00 PM   Vital Signs     Systolic 171 !  156 !    Diastolic 93 (H)  84 !         Assessment and Plan:    multiple myeloma IgG kappa high risk  Diagnosed 2017  Patient Dr. Parry  Patient is undergoing treatment with daratumumab, pomalidomide 2mg-( 2 weeks on 1 week off and 1 week off ) dose reduced due to side effects, dexamethasone 4 mg once a week  -labs reviewed; MM labs pending  -ok to proceed with daratumumab 6/4/2024; took last dose of pomalyst on 6/2/2024, she is on week break  -She is scheduled for tooth extractions on 7/8/2024, would like to discuss holding treatment during that time. Recommend that she speak with Dr. Parry  -schedule for labs, treatment and follow up with Dr. Parry on 7/2/2024  -cancel 7/8/2024 appointment with Dr. Parry    Anemia   stable  Patient denies bleeding  -will continue to monitor    Prophylaxis  Continue with aspirin and acyclovir    Bone health  Scheduled for multiple tooth extractions 7/8/2024  Continue to hold Zometa until dental work-up is done    Elevated BP  Slightly elevated during encounter; rechecked in infusion  Has lisinopril at home as needed  -request that she recheck BP at home if systolic >/=150 she should take Lisinopril 6/4/2024   -reach out to PCP if remains elevated    Generalized weakness  Chronic issue for the patient    Vertigo  Taking Meclizine TID  -has seen PT for Vestibular therapy, she doesn't like  it  -follows ENT    Urinary Frequency  Previously prescribed Myrbetriq by Urology.   Denies urinary burnng or irritation, denies hematuria or incontinence.  -Recommend that she reach out to Urology for recommendations/evaluation as she states it has been a while since she has followed up with them.    Call our clinic with any changes in health condition questions    Gisselle Morrow PA-C  I saw patient concurrently with ADELSO Draper and agree with the note above MARIUSZ Mckeon CNP      Chart documentation with Dragon Voice recognition Software. Although reviewed after completion, some words and grammatical errors may remain.      Again, thank you for allowing me to participate in the care of your patient.        Sincerely,        MARIUSZ Mckeon CNP

## 2024-06-04 NOTE — PROGRESS NOTES
Infusion Nursing Note:  Quiana Dunaway presents today for Darzalex Faspro.    Patient seen by provider today: Yes: Juaquin NP   present during visit today: Not Applicable.    Note: Holding Zometa today for planned tooth extraction next month.      Intravenous Access:  No Intravenous access/labs at this visit.    Treatment Conditions:  Not Applicable.      Post Infusion Assessment:  Patient tolerated injection without incident.  Site patent and intact, free from redness, edema or discomfort.       Discharge Plan:   Discharge instructions reviewed with: Patient and Family.  Patient and/or family verbalized understanding of discharge instructions and all questions answered.  AVS to patient via [employer].  Patient will return 7/2/24 for next appointment per Juaquin's sign out.   Patient discharged in stable condition accompanied by: daughter-in-law.  Departure Mode: Ambulatory with walker.      Kim Amado RN

## 2024-06-04 NOTE — RESULT ENCOUNTER NOTE
Dear Ms. Dunaway,    Blood test reveals myeloma to be improving.  This is good.    Please, call me with any questions.    Alex Parry MD

## 2024-06-04 NOTE — PROGRESS NOTES
Oncology/Hematology Visit Note  Jun 4, 2024    Reason for Visit: follow up of multiple myeloma IgG kappa high risk  Diagnosed 2017  Follows Dr. Parry  -08/14/2017 -04/30/2018: Velcade, Revlimid and dexamethasone Velcade stopped. Revlimid and dexamethasone continued.  12/30/2024: CT chest, abdomen, and pelvis does not reveal any malignancy.  -Revlimid held between 12/27/2021 and 03/08/2022.  -Daratumumab added on 11/14/2022.  03/13/2023- patient diagnosed with COVID her PCP prescribed molnupiravir.  Which she completed  -We held treatment  -Changed to daratumumab, pomalidomide and dexamethasone on 06/12/2023.  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  Patient is  currently undergoing treatment with daratumumab pomalidomide and dexamethasone. Due to the side effects Pomalyst reduced to 2 mg 2 weeks on 1 week off  -6/4/2024: current treatment: Pomalyst 2 mg (14 days on, 1 week off), daratumumab monthly, and dexamethasone 4 mg weekly        Interval History:  Ms. Dunaway reports to clinic today for D1 Cycle 20 of Pomalyst, daratumumab and dexamethasone.  She is tolerating treatment well. She last took Pomalyst on Sunday, she is on her week break.     She is anxious about upcoming tooth extractions, scheduled for 7/8/2024. Had one tooth pulled in May 2024. Requesting if possible to hold treatment after extraction.     She has occasional loose stools that she manages with Imodium.    She is having frequency of urination, previously seen by Urology and prescribed Myrbetriq. Denies burning upon urination, irritation,  flank pain or hematuria.  Denies urinary incontinence.    Chronic dizziness, taking Meclizine daily.    Denies fever, chills or sweats. Denies chest pain, cough or shortness of breath. Denies abdominal pain, n/v.    Review of Systems:  14 point ROS of systems including Constitutional, Eyes, Respiratory, Cardiovascular, Gastroenterology, Genitourinary, Integumentary,  Muscularskeletal, Psychiatric were all negative except for pertinent positives noted in my HPI.    Physical Examination:  Physical Exam  HENT:      Head: Normocephalic.      Right Ear: Tympanic membrane normal.      Nose: Nose normal.      Mouth/Throat:      Mouth: Mucous membranes are moist.   Eyes:      Pupils: Pupils are equal, round, and reactive to light.   Cardiovascular:      Rate and Rhythm: Normal rate.      Comments: Paced rhythm  Pulmonary:      Effort: Pulmonary effort is normal.      Breath sounds: Normal breath sounds.   Abdominal:      General: Abdomen is flat. Bowel sounds are normal.      Tenderness: There is no abdominal tenderness. There is no right CVA tenderness, left CVA tenderness, guarding or rebound.   Musculoskeletal:      Cervical back: Normal range of motion.   Skin:     General: Skin is warm.   Neurological:      General: No focal deficit present.      Mental Status: She is alert.      Comments: Ambulates with a walker   Psychiatric:         Mood and Affect: Mood normal.       LABS  -CBC, CMP labs reviewed  MM labs pending        6/4/2024  1:32 PM 6/4/2024  2:00 PM   Vital Signs     Systolic 171 !  156 !    Diastolic 93 (H)  84 !         Assessment and Plan:    multiple myeloma IgG kappa high risk  Diagnosed 2017  Patient Dr. Parry  Patient is undergoing treatment with daratumumab, pomalidomide 2mg-( 2 weeks on 1 week off and 1 week off ) dose reduced due to side effects, dexamethasone 4 mg once a week  -labs reviewed; MM labs pending  -ok to proceed with daratumumab 6/4/2024; took last dose of pomalyst on 6/2/2024, she is on week break  -She is scheduled for tooth extractions on 7/8/2024, would like to discuss holding treatment during that time. Recommend that she speak with Dr. Parry  -schedule for labs, treatment and follow up with Dr. Parry on 7/2/2024  -cancel 7/8/2024 appointment with Dr. Parry    Anemia   stable  Patient denies bleeding  -will continue to  monitor    Prophylaxis  Continue with aspirin and acyclovir    Bone health  Scheduled for multiple tooth extractions 7/8/2024  Continue to hold Zometa until dental work-up is done    Elevated BP  Slightly elevated during encounter; rechecked in infusion  Has lisinopril at home as needed  -request that she recheck BP at home if systolic >/=150 she should take Lisinopril 6/4/2024   -reach out to PCP if remains elevated    Generalized weakness  Chronic issue for the patient    Vertigo  Taking Meclizine TID  -has seen PT for Vestibular therapy, she doesn't like it  -follows ENT    Urinary Frequency  Previously prescribed Myrbetriq by Urology.   Denies urinary burnng or irritation, denies hematuria or incontinence.  -Recommend that she reach out to Urology for recommendations/evaluation as she states it has been a while since she has followed up with them.    Call our clinic with any changes in health condition questions    Gisselle Morrow PA-C  I saw patient concurrently with ADELSO Draper and agree with the note above MARIUSZ Mckeon CNP      Chart documentation with [employer] recognition [employer]. Although reviewed after completion, some words and grammatical errors may remain.

## 2024-06-04 NOTE — PROGRESS NOTES
"Oncology Rooming Note    June 4, 2024 1:33 PM   Quiana Dunaway is a 88 year old female who presents for:    Chief Complaint   Patient presents with    Oncology Clinic Visit     Initial Vitals: BP (!) 171/93   Pulse 91   Temp 98.2  F (36.8  C) (Oral)   Resp 16   Wt 64.9 kg (143 lb)   LMP  (LMP Unknown)   SpO2 94%   BMI 27.02 kg/m   Estimated body mass index is 27.02 kg/m  as calculated from the following:    Height as of 6/3/24: 1.549 m (5' 1\").    Weight as of this encounter: 64.9 kg (143 lb). Body surface area is 1.67 meters squared.  No Pain (0) Comment: Data Unavailable   No LMP recorded (lmp unknown). Patient is postmenopausal.  Allergies reviewed: Yes  Medications reviewed: Yes    Medications: Medication refills not needed today.  Pharmacy name entered into [employer]:    [facility] - [place], [place] - [address]  [id number] - [place], [place] - [address] [facility]    Frailty Screening:   Is the patient here for a new oncology consult visit in cancer care? 2. No      Clinical concerns:   pa was notified.  States of being dizzy    Mariela Lyn CMA            "

## 2024-06-05 DIAGNOSIS — C90.00 MULTIPLE MYELOMA NOT HAVING ACHIEVED REMISSION (H): Primary | ICD-10-CM

## 2024-06-05 LAB
ALBUMIN SERPL ELPH-MCNC: 3.9 G/DL (ref 3.7–5.1)
ALPHA1 GLOB SERPL ELPH-MCNC: 0.3 G/DL (ref 0.2–0.4)
ALPHA2 GLOB SERPL ELPH-MCNC: 0.6 G/DL (ref 0.5–0.9)
B-GLOBULIN SERPL ELPH-MCNC: 0.6 G/DL (ref 0.6–1)
GAMMA GLOB SERPL ELPH-MCNC: 0.9 G/DL (ref 0.7–1.6)
M PROTEIN SERPL ELPH-MCNC: 0.8 G/DL
PATH REPORT.COMMENTS IMP SPEC: ABNORMAL
PROT PATTERN SERPL ELPH-IMP: ABNORMAL

## 2024-06-05 RX ORDER — DIPHENHYDRAMINE HCL 25 MG
50 CAPSULE ORAL
Status: CANCELLED | OUTPATIENT
Start: 2024-07-02

## 2024-06-05 RX ORDER — HEPARIN SODIUM (PORCINE) LOCK FLUSH IV SOLN 100 UNIT/ML 100 UNIT/ML
5 SOLUTION INTRAVENOUS
Status: CANCELLED | OUTPATIENT
Start: 2024-07-02

## 2024-06-05 RX ORDER — ACETAMINOPHEN 325 MG/1
650 TABLET ORAL
Status: CANCELLED | OUTPATIENT
Start: 2024-07-02

## 2024-06-05 RX ORDER — DIPHENHYDRAMINE HYDROCHLORIDE 50 MG/ML
50 INJECTION INTRAMUSCULAR; INTRAVENOUS
Status: CANCELLED
Start: 2024-07-02

## 2024-06-05 RX ORDER — ALBUTEROL SULFATE 90 UG/1
1-2 AEROSOL, METERED RESPIRATORY (INHALATION)
Status: CANCELLED
Start: 2024-07-02

## 2024-06-05 RX ORDER — HEPARIN SODIUM,PORCINE 10 UNIT/ML
5 VIAL (ML) INTRAVENOUS
Status: CANCELLED | OUTPATIENT
Start: 2024-07-02

## 2024-06-05 RX ORDER — ALBUTEROL SULFATE 0.83 MG/ML
2.5 SOLUTION RESPIRATORY (INHALATION)
Status: CANCELLED | OUTPATIENT
Start: 2024-07-02

## 2024-06-05 RX ORDER — EPINEPHRINE 1 MG/ML
0.3 INJECTION, SOLUTION, CONCENTRATE INTRAVENOUS EVERY 5 MIN PRN
Status: CANCELLED | OUTPATIENT
Start: 2024-07-02

## 2024-06-05 RX ORDER — MEPERIDINE HYDROCHLORIDE 25 MG/ML
25 INJECTION INTRAMUSCULAR; INTRAVENOUS; SUBCUTANEOUS EVERY 30 MIN PRN
Status: CANCELLED | OUTPATIENT
Start: 2024-07-02

## 2024-06-05 RX ORDER — DEXAMETHASONE 4 MG/1
12 TABLET ORAL
Status: CANCELLED | OUTPATIENT
Start: 2024-07-02

## 2024-06-05 RX ORDER — LORAZEPAM 2 MG/ML
0.5 INJECTION INTRAMUSCULAR EVERY 4 HOURS PRN
Status: CANCELLED | OUTPATIENT
Start: 2024-07-02

## 2024-06-19 ENCOUNTER — TELEPHONE (OUTPATIENT)
Dept: PHARMACY | Facility: CLINIC | Age: 89
End: 2024-06-19
Payer: MEDICARE

## 2024-06-19 DIAGNOSIS — E53.8 VITAMIN B12 DEFICIENCY (NON ANEMIC): ICD-10-CM

## 2024-06-19 DIAGNOSIS — C90.00 MULTIPLE MYELOMA NOT HAVING ACHIEVED REMISSION (H): Primary | ICD-10-CM

## 2024-06-19 NOTE — TELEPHONE ENCOUNTER
Anesthesia Post-op Note    Patient: Reina Arroyo  Procedure(s) Performed: CYSTOSCOPY WITH TRANSURETHRAL RESECTION OF BLADDER TUMOR (TURBT) RIGHT URETEROSCOPY, BILATERIAL RETROGRADEPYLOGRAMS  Anesthesia type: General    Vitals Value Taken Time   Temp 36.3 °C (97.3 °F) 06/16/23 1538   Pulse 80 06/16/23 1538   Resp 14 06/16/23 1515   SpO2 97 % 06/16/23 1538   /62 06/16/23 1515         Patient Location: Phase II  Post-op Vital Signs:stable  Level of Consciousness: participates in exam, awake, oriented, answers questions appropriately and alert  Respiratory Status: spontaneous ventilation and unassisted  Cardiovascular blood pressure returned to baseline  Hydration: euvolemic  Pain Management: adequately managed  Handoff: Handoff to receiving clinician was performed and questions were answered  Vomiting: none  Nausea: None  Airway Patency:patent  Post-op Assessment: awake, alert, appropriately conversant, or baseline, no complications, patient tolerated procedure well, no evidence of recall, dentition within defined limits, moving all extremities and No Corneal Abrasion      No notable events documented.   Oral Chemotherapy Monitoring Program   Medication: Pomalyst  Rx: 2mg PO daily on days 1 through 14 of 21 day cycle   Auth #:  [id number]  Risk Category: adult female of non child bearing potential  Routine survey questions reviewed.   Rx to be Escribed to [facility]     Terrie Bowen [facility] Oncology Pharmacy Liaison  [phone]

## 2024-06-20 RX ORDER — LANOLIN ALCOHOL/MO/W.PET/CERES
1000 CREAM (GRAM) TOPICAL DAILY
Qty: 90 TABLET | Refills: 2 | Status: SHIPPED | OUTPATIENT
Start: 2024-06-20

## 2024-06-26 ENCOUNTER — ANCILLARY PROCEDURE (OUTPATIENT)
Dept: CARDIOLOGY | Facility: CLINIC | Age: 89
End: 2024-06-26
Attending: INTERNAL MEDICINE
Payer: MEDICARE

## 2024-06-26 DIAGNOSIS — Z95.0 CARDIAC PACEMAKER IN SITU: ICD-10-CM

## 2024-06-26 DIAGNOSIS — I49.5 SICK SINUS SYNDROME (H): Primary | ICD-10-CM

## 2024-06-26 PROCEDURE — 93280 PM DEVICE PROGR EVAL DUAL: CPT | Performed by: INTERNAL MEDICINE

## 2024-06-27 LAB
MDC_IDC_EPISODE_DTM: NORMAL
MDC_IDC_EPISODE_DURATION: 118 S
MDC_IDC_EPISODE_DURATION: 138 S
MDC_IDC_EPISODE_DURATION: 155 S
MDC_IDC_EPISODE_DURATION: 168 S
MDC_IDC_EPISODE_DURATION: 1697 S
MDC_IDC_EPISODE_DURATION: 1792 S
MDC_IDC_EPISODE_DURATION: 184 S
MDC_IDC_EPISODE_DURATION: 185 S
MDC_IDC_EPISODE_DURATION: 189 S
MDC_IDC_EPISODE_DURATION: 190 S
MDC_IDC_EPISODE_DURATION: 21 S
MDC_IDC_EPISODE_DURATION: 222 S
MDC_IDC_EPISODE_DURATION: 245 S
MDC_IDC_EPISODE_DURATION: 255 S
MDC_IDC_EPISODE_DURATION: 2567 S
MDC_IDC_EPISODE_DURATION: 264 S
MDC_IDC_EPISODE_DURATION: 270 S
MDC_IDC_EPISODE_DURATION: 319 S
MDC_IDC_EPISODE_DURATION: 32 S
MDC_IDC_EPISODE_DURATION: 37 S
MDC_IDC_EPISODE_DURATION: 37 S
MDC_IDC_EPISODE_DURATION: 3735 S
MDC_IDC_EPISODE_DURATION: 4 S
MDC_IDC_EPISODE_DURATION: 415 S
MDC_IDC_EPISODE_DURATION: 431 S
MDC_IDC_EPISODE_DURATION: 474 S
MDC_IDC_EPISODE_DURATION: 5 S
MDC_IDC_EPISODE_DURATION: 506 S
MDC_IDC_EPISODE_DURATION: 55 S
MDC_IDC_EPISODE_DURATION: 57 S
MDC_IDC_EPISODE_DURATION: 71 S
MDC_IDC_EPISODE_DURATION: 714 S
MDC_IDC_EPISODE_DURATION: 75 S
MDC_IDC_EPISODE_DURATION: 80 S
MDC_IDC_EPISODE_DURATION: 81 S
MDC_IDC_EPISODE_DURATION: 85 S
MDC_IDC_EPISODE_DURATION: 909 S
MDC_IDC_EPISODE_ID: 135
MDC_IDC_EPISODE_ID: 136
MDC_IDC_EPISODE_ID: 137
MDC_IDC_EPISODE_ID: 138
MDC_IDC_EPISODE_ID: 139
MDC_IDC_EPISODE_ID: 140
MDC_IDC_EPISODE_ID: 141
MDC_IDC_EPISODE_ID: 142
MDC_IDC_EPISODE_ID: 143
MDC_IDC_EPISODE_ID: 144
MDC_IDC_EPISODE_ID: 145
MDC_IDC_EPISODE_ID: 146
MDC_IDC_EPISODE_ID: 147
MDC_IDC_EPISODE_ID: 148
MDC_IDC_EPISODE_ID: 149
MDC_IDC_EPISODE_ID: 150
MDC_IDC_EPISODE_ID: 151
MDC_IDC_EPISODE_ID: 152
MDC_IDC_EPISODE_ID: 153
MDC_IDC_EPISODE_ID: 154
MDC_IDC_EPISODE_ID: 155
MDC_IDC_EPISODE_ID: 156
MDC_IDC_EPISODE_ID: 157
MDC_IDC_EPISODE_ID: 158
MDC_IDC_EPISODE_ID: 159
MDC_IDC_EPISODE_ID: 160
MDC_IDC_EPISODE_ID: 161
MDC_IDC_EPISODE_ID: 162
MDC_IDC_EPISODE_ID: 163
MDC_IDC_EPISODE_ID: 164
MDC_IDC_EPISODE_ID: 165
MDC_IDC_EPISODE_ID: 166
MDC_IDC_EPISODE_ID: 167
MDC_IDC_EPISODE_ID: 168
MDC_IDC_EPISODE_ID: 169
MDC_IDC_EPISODE_ID: 170
MDC_IDC_EPISODE_ID: 171
MDC_IDC_EPISODE_TYPE: NORMAL
MDC_IDC_EPISODE_TYPE_INDUCED: NO
MDC_IDC_LEAD_CONNECTION_STATUS: NORMAL
MDC_IDC_LEAD_CONNECTION_STATUS: NORMAL
MDC_IDC_LEAD_IMPLANT_DT: NORMAL
MDC_IDC_LEAD_IMPLANT_DT: NORMAL
MDC_IDC_LEAD_LOCATION: NORMAL
MDC_IDC_LEAD_LOCATION: NORMAL
MDC_IDC_LEAD_LOCATION_DETAIL_1: NORMAL
MDC_IDC_LEAD_LOCATION_DETAIL_1: NORMAL
MDC_IDC_LEAD_MFG: NORMAL
MDC_IDC_LEAD_MFG: NORMAL
MDC_IDC_LEAD_MODEL: NORMAL
MDC_IDC_LEAD_MODEL: NORMAL
MDC_IDC_LEAD_POLARITY_TYPE: NORMAL
MDC_IDC_LEAD_POLARITY_TYPE: NORMAL
MDC_IDC_LEAD_SERIAL: NORMAL
MDC_IDC_LEAD_SERIAL: NORMAL
MDC_IDC_MSMT_BATTERY_DTM: NORMAL
MDC_IDC_MSMT_BATTERY_REMAINING_LONGEVITY: 11 MO
MDC_IDC_MSMT_BATTERY_RRT_TRIGGER: 2.83
MDC_IDC_MSMT_BATTERY_STATUS: NORMAL
MDC_IDC_MSMT_BATTERY_VOLTAGE: 2.89 V
MDC_IDC_MSMT_LEADCHNL_RA_IMPEDANCE_VALUE: 361 OHM
MDC_IDC_MSMT_LEADCHNL_RA_IMPEDANCE_VALUE: 361 OHM
MDC_IDC_MSMT_LEADCHNL_RA_PACING_THRESHOLD_AMPLITUDE: 0.38 V
MDC_IDC_MSMT_LEADCHNL_RA_PACING_THRESHOLD_PULSEWIDTH: 0.4 MS
MDC_IDC_MSMT_LEADCHNL_RA_SENSING_INTR_AMPL: 2 MV
MDC_IDC_MSMT_LEADCHNL_RA_SENSING_INTR_AMPL: 2 MV
MDC_IDC_MSMT_LEADCHNL_RV_IMPEDANCE_VALUE: 418 OHM
MDC_IDC_MSMT_LEADCHNL_RV_IMPEDANCE_VALUE: 513 OHM
MDC_IDC_MSMT_LEADCHNL_RV_PACING_THRESHOLD_AMPLITUDE: 1.25 V
MDC_IDC_MSMT_LEADCHNL_RV_PACING_THRESHOLD_PULSEWIDTH: 0.4 MS
MDC_IDC_MSMT_LEADCHNL_RV_SENSING_INTR_AMPL: 1.25 MV
MDC_IDC_MSMT_LEADCHNL_RV_SENSING_INTR_AMPL: 1.25 MV
MDC_IDC_PG_IMPLANT_DTM: NORMAL
MDC_IDC_PG_MFG: NORMAL
MDC_IDC_PG_MODEL: NORMAL
MDC_IDC_PG_SERIAL: NORMAL
MDC_IDC_PG_TYPE: NORMAL
MDC_IDC_SESS_CLINIC_NAME: NORMAL
MDC_IDC_SESS_DTM: NORMAL
MDC_IDC_SESS_TYPE: NORMAL
MDC_IDC_SET_BRADY_AT_MODE_SWITCH_RATE: 171 {BEATS}/MIN
MDC_IDC_SET_BRADY_HYSTRATE: NORMAL
MDC_IDC_SET_BRADY_LOWRATE: 60 {BEATS}/MIN
MDC_IDC_SET_BRADY_MAX_SENSOR_RATE: 120 {BEATS}/MIN
MDC_IDC_SET_BRADY_MAX_TRACKING_RATE: 120 {BEATS}/MIN
MDC_IDC_SET_BRADY_MODE: NORMAL
MDC_IDC_SET_BRADY_PAV_DELAY_LOW: 200 MS
MDC_IDC_SET_BRADY_SAV_DELAY_LOW: 180 MS
MDC_IDC_SET_LEADCHNL_RA_PACING_AMPLITUDE: 1.5 V
MDC_IDC_SET_LEADCHNL_RA_PACING_ANODE_ELECTRODE_1: NORMAL
MDC_IDC_SET_LEADCHNL_RA_PACING_ANODE_LOCATION_1: NORMAL
MDC_IDC_SET_LEADCHNL_RA_PACING_CAPTURE_MODE: NORMAL
MDC_IDC_SET_LEADCHNL_RA_PACING_CATHODE_ELECTRODE_1: NORMAL
MDC_IDC_SET_LEADCHNL_RA_PACING_CATHODE_LOCATION_1: NORMAL
MDC_IDC_SET_LEADCHNL_RA_PACING_POLARITY: NORMAL
MDC_IDC_SET_LEADCHNL_RA_PACING_PULSEWIDTH: 0.4 MS
MDC_IDC_SET_LEADCHNL_RA_SENSING_ANODE_ELECTRODE_1: NORMAL
MDC_IDC_SET_LEADCHNL_RA_SENSING_ANODE_LOCATION_1: NORMAL
MDC_IDC_SET_LEADCHNL_RA_SENSING_CATHODE_ELECTRODE_1: NORMAL
MDC_IDC_SET_LEADCHNL_RA_SENSING_CATHODE_LOCATION_1: NORMAL
MDC_IDC_SET_LEADCHNL_RA_SENSING_POLARITY: NORMAL
MDC_IDC_SET_LEADCHNL_RA_SENSING_SENSITIVITY: 0.3 MV
MDC_IDC_SET_LEADCHNL_RV_PACING_AMPLITUDE: 3 V
MDC_IDC_SET_LEADCHNL_RV_PACING_ANODE_ELECTRODE_1: NORMAL
MDC_IDC_SET_LEADCHNL_RV_PACING_ANODE_LOCATION_1: NORMAL
MDC_IDC_SET_LEADCHNL_RV_PACING_CAPTURE_MODE: NORMAL
MDC_IDC_SET_LEADCHNL_RV_PACING_CATHODE_ELECTRODE_1: NORMAL
MDC_IDC_SET_LEADCHNL_RV_PACING_CATHODE_LOCATION_1: NORMAL
MDC_IDC_SET_LEADCHNL_RV_PACING_POLARITY: NORMAL
MDC_IDC_SET_LEADCHNL_RV_PACING_PULSEWIDTH: 0.4 MS
MDC_IDC_SET_LEADCHNL_RV_SENSING_ANODE_ELECTRODE_1: NORMAL
MDC_IDC_SET_LEADCHNL_RV_SENSING_ANODE_LOCATION_1: NORMAL
MDC_IDC_SET_LEADCHNL_RV_SENSING_CATHODE_ELECTRODE_1: NORMAL
MDC_IDC_SET_LEADCHNL_RV_SENSING_CATHODE_LOCATION_1: NORMAL
MDC_IDC_SET_LEADCHNL_RV_SENSING_POLARITY: NORMAL
MDC_IDC_SET_LEADCHNL_RV_SENSING_SENSITIVITY: 0.9 MV
MDC_IDC_SET_ZONE_DETECTION_INTERVAL: 350 MS
MDC_IDC_SET_ZONE_DETECTION_INTERVAL: 400 MS
MDC_IDC_SET_ZONE_STATUS: NORMAL
MDC_IDC_SET_ZONE_STATUS: NORMAL
MDC_IDC_SET_ZONE_TYPE: NORMAL
MDC_IDC_SET_ZONE_VENDOR_TYPE: NORMAL
MDC_IDC_STAT_AT_BURDEN_PERCENT: 0.1 %
MDC_IDC_STAT_AT_DTM_END: NORMAL
MDC_IDC_STAT_AT_DTM_START: NORMAL
MDC_IDC_STAT_BRADY_AP_VP_PERCENT: 86.12 %
MDC_IDC_STAT_BRADY_AP_VS_PERCENT: 0.03 %
MDC_IDC_STAT_BRADY_AS_VP_PERCENT: 13.85 %
MDC_IDC_STAT_BRADY_AS_VS_PERCENT: 0.01 %
MDC_IDC_STAT_BRADY_DTM_END: NORMAL
MDC_IDC_STAT_BRADY_DTM_START: NORMAL
MDC_IDC_STAT_BRADY_RA_PERCENT_PACED: 86.04 %
MDC_IDC_STAT_BRADY_RV_PERCENT_PACED: 99.97 %
MDC_IDC_STAT_EPISODE_RECENT_COUNT: 0
MDC_IDC_STAT_EPISODE_RECENT_COUNT: 119
MDC_IDC_STAT_EPISODE_RECENT_COUNT_DTM_END: NORMAL
MDC_IDC_STAT_EPISODE_RECENT_COUNT_DTM_START: NORMAL
MDC_IDC_STAT_EPISODE_TOTAL_COUNT: 0
MDC_IDC_STAT_EPISODE_TOTAL_COUNT: 0
MDC_IDC_STAT_EPISODE_TOTAL_COUNT: 14
MDC_IDC_STAT_EPISODE_TOTAL_COUNT: 154
MDC_IDC_STAT_EPISODE_TOTAL_COUNT_DTM_END: NORMAL
MDC_IDC_STAT_EPISODE_TOTAL_COUNT_DTM_START: NORMAL
MDC_IDC_STAT_EPISODE_TYPE: NORMAL
MDC_IDC_STAT_TACHYTHERAPY_RECENT_DTM_END: NORMAL
MDC_IDC_STAT_TACHYTHERAPY_RECENT_DTM_START: NORMAL
MDC_IDC_STAT_TACHYTHERAPY_TOTAL_DTM_END: NORMAL
MDC_IDC_STAT_TACHYTHERAPY_TOTAL_DTM_START: NORMAL

## 2024-07-08 ENCOUNTER — LAB (OUTPATIENT)
Dept: INFUSION THERAPY | Facility: CLINIC | Age: 89
End: 2024-07-08
Attending: NURSE PRACTITIONER
Payer: MEDICARE

## 2024-07-08 ENCOUNTER — ONCOLOGY VISIT (OUTPATIENT)
Dept: ONCOLOGY | Facility: CLINIC | Age: 89
End: 2024-07-08
Attending: NURSE PRACTITIONER
Payer: MEDICARE

## 2024-07-08 VITALS
SYSTOLIC BLOOD PRESSURE: 137 MMHG | OXYGEN SATURATION: 96 % | HEART RATE: 83 BPM | BODY MASS INDEX: 26.38 KG/M2 | TEMPERATURE: 97.9 F | WEIGHT: 139.6 LBS | DIASTOLIC BLOOD PRESSURE: 70 MMHG | RESPIRATION RATE: 17 BRPM

## 2024-07-08 DIAGNOSIS — C90.00 MULTIPLE MYELOMA NOT HAVING ACHIEVED REMISSION (H): Primary | ICD-10-CM

## 2024-07-08 LAB
ALBUMIN SERPL BCG-MCNC: 3.9 G/DL (ref 3.5–5.2)
ALP SERPL-CCNC: 99 U/L (ref 40–150)
ALT SERPL W P-5'-P-CCNC: 29 U/L (ref 0–50)
ANION GAP SERPL CALCULATED.3IONS-SCNC: 9 MMOL/L (ref 7–15)
AST SERPL W P-5'-P-CCNC: 18 U/L (ref 0–45)
BASOPHILS # BLD AUTO: 0 10E3/UL (ref 0–0.2)
BASOPHILS NFR BLD AUTO: 1 %
BILIRUB SERPL-MCNC: 0.5 MG/DL
BUN SERPL-MCNC: 22.6 MG/DL (ref 8–23)
CALCIUM SERPL-MCNC: 9.1 MG/DL (ref 8.8–10.2)
CHLORIDE SERPL-SCNC: 108 MMOL/L (ref 98–107)
CREAT SERPL-MCNC: 0.56 MG/DL (ref 0.51–0.95)
DEPRECATED HCO3 PLAS-SCNC: 25 MMOL/L (ref 22–29)
EGFRCR SERPLBLD CKD-EPI 2021: 88 ML/MIN/1.73M2
EOSINOPHIL # BLD AUTO: 0.1 10E3/UL (ref 0–0.7)
EOSINOPHIL NFR BLD AUTO: 1 %
ERYTHROCYTE [DISTWIDTH] IN BLOOD BY AUTOMATED COUNT: 14.6 % (ref 10–15)
GLUCOSE SERPL-MCNC: 110 MG/DL (ref 70–99)
HCT VFR BLD AUTO: 31.8 % (ref 35–47)
HGB BLD-MCNC: 10.6 G/DL (ref 11.7–15.7)
IGG SERPL-MCNC: 1074 MG/DL (ref 610–1616)
IMM GRANULOCYTES # BLD: 0 10E3/UL
IMM GRANULOCYTES NFR BLD: 1 %
KAPPA LC FREE SER-MCNC: 45.18 MG/DL (ref 0.33–1.94)
KAPPA LC FREE/LAMBDA FREE SER NEPH: 132.88 {RATIO} (ref 0.26–1.65)
LAMBDA LC FREE SERPL-MCNC: 0.34 MG/DL (ref 0.57–2.63)
LYMPHOCYTES # BLD AUTO: 1.6 10E3/UL (ref 0.8–5.3)
LYMPHOCYTES NFR BLD AUTO: 35 %
MCH RBC QN AUTO: 30 PG (ref 26.5–33)
MCHC RBC AUTO-ENTMCNC: 33.3 G/DL (ref 31.5–36.5)
MCV RBC AUTO: 90 FL (ref 78–100)
MONOCYTES # BLD AUTO: 0.6 10E3/UL (ref 0–1.3)
MONOCYTES NFR BLD AUTO: 13 %
NEUTROPHILS # BLD AUTO: 2.2 10E3/UL (ref 1.6–8.3)
NEUTROPHILS NFR BLD AUTO: 50 %
NRBC # BLD AUTO: 0 10E3/UL
NRBC BLD AUTO-RTO: 0 /100
PLATELET # BLD AUTO: 155 10E3/UL (ref 150–450)
POTASSIUM SERPL-SCNC: 3.7 MMOL/L (ref 3.4–5.3)
PROT SERPL-MCNC: 6.5 G/DL (ref 6.4–8.3)
RBC # BLD AUTO: 3.53 10E6/UL (ref 3.8–5.2)
SODIUM SERPL-SCNC: 142 MMOL/L (ref 135–145)
TOTAL PROTEIN SERUM FOR ELP: 6.2 G/DL (ref 6.4–8.3)
WBC # BLD AUTO: 4.4 10E3/UL (ref 4–11)

## 2024-07-08 PROCEDURE — 85025 COMPLETE CBC W/AUTO DIFF WBC: CPT | Performed by: INTERNAL MEDICINE

## 2024-07-08 PROCEDURE — G0463 HOSPITAL OUTPT CLINIC VISIT: HCPCS | Performed by: INTERNAL MEDICINE

## 2024-07-08 PROCEDURE — 99215 OFFICE O/P EST HI 40 MIN: CPT | Performed by: INTERNAL MEDICINE

## 2024-07-08 PROCEDURE — 36415 COLL VENOUS BLD VENIPUNCTURE: CPT | Performed by: INTERNAL MEDICINE

## 2024-07-08 PROCEDURE — 80053 COMPREHEN METABOLIC PANEL: CPT | Performed by: INTERNAL MEDICINE

## 2024-07-08 PROCEDURE — 83521 IG LIGHT CHAINS FREE EACH: CPT | Performed by: INTERNAL MEDICINE

## 2024-07-08 PROCEDURE — 250N000011 HC RX IP 250 OP 636: Performed by: INTERNAL MEDICINE

## 2024-07-08 PROCEDURE — 84155 ASSAY OF PROTEIN SERUM: CPT | Mod: 91 | Performed by: INTERNAL MEDICINE

## 2024-07-08 PROCEDURE — 84165 PROTEIN E-PHORESIS SERUM: CPT | Mod: 26 | Performed by: STUDENT IN AN ORGANIZED HEALTH CARE EDUCATION/TRAINING PROGRAM

## 2024-07-08 PROCEDURE — 84165 PROTEIN E-PHORESIS SERUM: CPT | Mod: TC | Performed by: STUDENT IN AN ORGANIZED HEALTH CARE EDUCATION/TRAINING PROGRAM

## 2024-07-08 PROCEDURE — 82784 ASSAY IGA/IGD/IGG/IGM EACH: CPT | Performed by: INTERNAL MEDICINE

## 2024-07-08 PROCEDURE — 96401 CHEMO ANTI-NEOPL SQ/IM: CPT

## 2024-07-08 RX ORDER — HEPARIN SODIUM,PORCINE 10 UNIT/ML
5 VIAL (ML) INTRAVENOUS
Status: CANCELLED | OUTPATIENT
Start: 2024-08-15

## 2024-07-08 RX ORDER — METHYLPREDNISOLONE SODIUM SUCCINATE 125 MG/2ML
125 INJECTION, POWDER, LYOPHILIZED, FOR SOLUTION INTRAMUSCULAR; INTRAVENOUS
Status: CANCELLED
Start: 2024-09-12

## 2024-07-08 RX ORDER — EPINEPHRINE 1 MG/ML
0.3 INJECTION, SOLUTION INTRAMUSCULAR; SUBCUTANEOUS EVERY 5 MIN PRN
Status: CANCELLED | OUTPATIENT
Start: 2024-09-12

## 2024-07-08 RX ORDER — ACETAMINOPHEN 325 MG/1
650 TABLET ORAL
Status: CANCELLED | OUTPATIENT
Start: 2024-09-12

## 2024-07-08 RX ORDER — EPINEPHRINE 1 MG/ML
0.3 INJECTION, SOLUTION INTRAMUSCULAR; SUBCUTANEOUS EVERY 5 MIN PRN
Status: CANCELLED | OUTPATIENT
Start: 2024-08-15

## 2024-07-08 RX ORDER — ALBUTEROL SULFATE 0.83 MG/ML
2.5 SOLUTION RESPIRATORY (INHALATION)
Status: CANCELLED | OUTPATIENT
Start: 2024-09-12

## 2024-07-08 RX ORDER — METHYLPREDNISOLONE SODIUM SUCCINATE 125 MG/2ML
125 INJECTION, POWDER, LYOPHILIZED, FOR SOLUTION INTRAMUSCULAR; INTRAVENOUS
Status: CANCELLED
Start: 2024-08-15

## 2024-07-08 RX ORDER — HEPARIN SODIUM,PORCINE 10 UNIT/ML
5 VIAL (ML) INTRAVENOUS
Status: CANCELLED | OUTPATIENT
Start: 2024-09-12

## 2024-07-08 RX ORDER — HEPARIN SODIUM (PORCINE) LOCK FLUSH IV SOLN 100 UNIT/ML 100 UNIT/ML
5 SOLUTION INTRAVENOUS
Status: CANCELLED | OUTPATIENT
Start: 2024-09-12

## 2024-07-08 RX ORDER — DIPHENHYDRAMINE HYDROCHLORIDE 50 MG/ML
50 INJECTION INTRAMUSCULAR; INTRAVENOUS
Status: CANCELLED
Start: 2024-09-12

## 2024-07-08 RX ORDER — MEPERIDINE HYDROCHLORIDE 25 MG/ML
25 INJECTION INTRAMUSCULAR; INTRAVENOUS; SUBCUTANEOUS EVERY 30 MIN PRN
Status: CANCELLED | OUTPATIENT
Start: 2024-09-12

## 2024-07-08 RX ORDER — DIPHENHYDRAMINE HYDROCHLORIDE 50 MG/ML
50 INJECTION INTRAMUSCULAR; INTRAVENOUS
Status: CANCELLED
Start: 2024-08-15

## 2024-07-08 RX ORDER — ALBUTEROL SULFATE 90 UG/1
1-2 AEROSOL, METERED RESPIRATORY (INHALATION)
Status: CANCELLED
Start: 2024-08-15

## 2024-07-08 RX ORDER — LORAZEPAM 2 MG/ML
0.5 INJECTION INTRAMUSCULAR EVERY 4 HOURS PRN
Status: CANCELLED | OUTPATIENT
Start: 2024-08-15

## 2024-07-08 RX ORDER — LORAZEPAM 2 MG/ML
0.5 INJECTION INTRAMUSCULAR EVERY 4 HOURS PRN
Status: CANCELLED | OUTPATIENT
Start: 2024-09-12

## 2024-07-08 RX ORDER — HEPARIN SODIUM (PORCINE) LOCK FLUSH IV SOLN 100 UNIT/ML 100 UNIT/ML
5 SOLUTION INTRAVENOUS
Status: CANCELLED | OUTPATIENT
Start: 2024-08-15

## 2024-07-08 RX ORDER — DIPHENHYDRAMINE HCL 25 MG
50 CAPSULE ORAL
Status: CANCELLED | OUTPATIENT
Start: 2024-08-15

## 2024-07-08 RX ORDER — ACETAMINOPHEN 325 MG/1
650 TABLET ORAL
Status: CANCELLED | OUTPATIENT
Start: 2024-08-15

## 2024-07-08 RX ORDER — MEPERIDINE HYDROCHLORIDE 25 MG/ML
25 INJECTION INTRAMUSCULAR; INTRAVENOUS; SUBCUTANEOUS EVERY 30 MIN PRN
Status: CANCELLED | OUTPATIENT
Start: 2024-08-15

## 2024-07-08 RX ORDER — DEXAMETHASONE 4 MG/1
12 TABLET ORAL
Status: CANCELLED | OUTPATIENT
Start: 2024-09-12

## 2024-07-08 RX ORDER — DEXAMETHASONE 4 MG/1
12 TABLET ORAL
Status: CANCELLED | OUTPATIENT
Start: 2024-08-15

## 2024-07-08 RX ORDER — ALBUTEROL SULFATE 90 UG/1
1-2 AEROSOL, METERED RESPIRATORY (INHALATION)
Status: CANCELLED
Start: 2024-09-12

## 2024-07-08 RX ORDER — ALBUTEROL SULFATE 0.83 MG/ML
2.5 SOLUTION RESPIRATORY (INHALATION)
Status: CANCELLED | OUTPATIENT
Start: 2024-08-15

## 2024-07-08 RX ORDER — DIPHENHYDRAMINE HCL 25 MG
50 CAPSULE ORAL
Status: CANCELLED | OUTPATIENT
Start: 2024-09-12

## 2024-07-08 RX ADMIN — DARATUMUMAB AND HYALURONIDASE-FIHJ (HUMAN RECOMBINANT) 1800 MG: 1800; 30000 INJECTION SUBCUTANEOUS at 09:43

## 2024-07-08 ASSESSMENT — PAIN SCALES - GENERAL: PAINLEVEL: NO PAIN (0)

## 2024-07-08 NOTE — LETTER
"7/8/2024      Quiana Dunaway  [address] [facility] [address]      Dear Colleague,    Thank you for referring your patient, Quiana Dunaway, to the [facility]. Please see a copy of my visit note below.    Oncology Rooming Note    July 8, 2024 8:29 AM   Quiana Dunaway is a 88 year old female who presents for:    Chief Complaint   Patient presents with     Oncology Clinic Visit     Initial Vitals: /70   Pulse 83   Temp 97.9  F (36.6  C) (Oral)   Resp 17   Wt 63.3 kg (139 lb 9.6 oz)   LMP  (LMP Unknown)   SpO2 96%   BMI 26.38 kg/m   Estimated body mass index is 26.38 kg/m  as calculated from the following:    Height as of 6/3/24: 1.549 m (5' 1\").    Weight as of this encounter: 63.3 kg (139 lb 9.6 oz). Body surface area is 1.65 meters squared.  No Pain (0) Comment: Data Unavailable   No LMP recorded (lmp unknown). Patient is postmenopausal.  Allergies reviewed: Yes  Medications reviewed: Yes    Medications: Medication refills not needed today.  Pharmacy name entered into [facility]:    [facility] - [place], [place] - [address]  [id number] - [place], [place] - [address] [facility] [address] AT [facility]    Frailty Screening:   Is the patient here for a new oncology consult visit in cancer care? 2. No      Clinical concerns:   5 min was notified.      Mariela Lyn, [facility]              HEMATOLOGY HISTORY: Mrs. Dunaway is a lady with multiple myeloma (IgG kappa). High risk, t(14;16).  1. On 05/01/2017, WBC of 3.4, hemoglobin of 10.2 and platelet of 154.  2. SPEP on 07/07/2017 revealed M-spike of 1.8.  3. Bone marrow biopsy on 07/12/2017 reveals kappa monotypic plasma cell population consistent with multiple myeloma.  Bone marrow is 70% cellular.  Plasma cell is 50-60%.     -There is gain of chromosome 1, 5, 9, 15, and 17.  There is translocation 14;16.   4.  On 07/18/2017:  -M-spike of 1.9.   -Immunofixation reveals " monoclonal IgG kappa.    -IgG level of 2970.  IgA of 15 and IgM of 175.    -Kappa free light chain of 67.7.  Lambda free light chain of 1.42.  Ratio of kappa to lambda of 47.71.    -Beta 2 microglobulin of 3.4.   5. PET scan on 07/24/2017 reveals several focal areas of increased FDG uptake consistent with multiple myeloma.  There is probable epithelial cyst in tail of the pancreas.  No associated abnormal FDG activity.  Left thyroid cysts or nodules without any FDG activity.  There is a 0.3 cm left upper lobe lung nodule.   6.  Velcade, Revlimid and dexamethasone between on 08/14/2017 and 04/30/2018. Velcade stopped.   -Revlimid and dexamethasone continued.  -Revlimid held between 12/27/2021 and 03/08/2022.  -Daratumumab added on 11/14/2022.  -Changed to daratumumab, pomalidomide and dexamethasone on 06/12/2023.  7. CT chest, abdomen, and pelvis on 12/30/2021 does not reveal any malignancy.  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     SUBJECTIVE:  Ms. Dunaway is an 88-year-old female with multiple myeloma on daratumumab, pomalidomide and dexamethasone.  She takesn pomalidomide 2 mg a day for 2 weeks on and 1 week off.  Dose reduction due to side effects.  She takes dexamethasone 4 mg weekly.  She is tolerating treatment well.  Labs on 06/03/2024 revealed decrease in kappa free light chain.    Patient has some dental problem.  She needs dental extraction.  Because of that we will hold Zometa.     Her overall condition is stable.  She has mild generalized weakness secondary to myeloma, her age and medications.  No worsening of weakness.  She can do activities of daily living.  She has arthritic joint pain. No worsening of pain.  She uses walker.     She has chronic dizziness.  No worsening of dizziness.  No recent falls.  No headache.  No chest pain.  No shortness of breath.  No abdominal pain. No nausea. No vomiting.  No urinary complaint.  She gets intermittent diarrhea.  No  bleeding.  No fever or night sweats.  All other review of system is negative.      PHYSICAL EXAMINATION:   GENERAL:  Alert and oriented x 3.  Not in distress.  VITAL SIGNS:  Reviewed.       Rest of the system is not examined.     LABORATORY: CBC and CMP reviewed.      ASSESSMENT:    1.  An 88-year-old female with multiple myeloma on daratumumab, pomalidomide and dexamethasone.  Myeloma is stable.  2.  Mild normocytic anemia from myeloma and its treatment. Anemia is stable.  3.  Chronic dizziness.  Stable.  4.  Generalized weakness from myeloma, anemia, medication and old age.  Stable.  5.  Arthritic joint pain.  6.  Dental problem.     PLAN:  Continue pomalidomid, daratumumab and dexamethasone. (Pomalidomide 2 mg a day for 2 weeks on and 1 week off.  Dexamethasone 4 mg weekly)  Hold zometa.  See NP in 4 weeks.  See me in 8 weeks.  Continue prophylactic aspirin and acyclovir.     DISCUSSION:  1.  Patient's overall condition is stable.  She has no new symptoms.  Myeloma labs done in June reviewed.  It revealed decrease in kappa free light chain.  Explained to the patient that myeloma is stable.     Patient will continue on the current treatment with daratumumab, pomalidomide and dexamethasone.  She is on reduced dose of pomalidomide due to side effects.  She is tolerating it well.    2.  She will continue on prophylactic aspirin and acyclovir.    3.  Patient recently saw dentist.  Dental extraction is planned.  We will hold Zometa.    4.  Patient has generalized weakness and chronic dizziness.  She is at risk of fall.  Advised her to be careful.  She continues using walker.  She will continue on meclizine.     5.  She and her daughter had a few questions all answered.  I will see her in 2 months time.  In between she will see our ROSA MARIA.  Advised her to call us with any questions or concerns.     TOTAL VISIT TIME: 40 minutes.  Time spent in today's visit, review of chart/investigations today, monitoring for toxicity of  high risk medications and documentation today.             Again, thank you for allowing me to participate in the care of your patient.        Sincerely,        Alex Parry MD

## 2024-07-08 NOTE — PROGRESS NOTES
"Oncology Rooming Note    July 8, 2024 8:29 AM   Quiana Dunaway is a 88 year old female who presents for:    Chief Complaint   Patient presents with    Oncology Clinic Visit     Initial Vitals: /70   Pulse 83   Temp 97.9  F (36.6  C) (Oral)   Resp 17   Wt 63.3 kg (139 lb 9.6 oz)   LMP  (LMP Unknown)   SpO2 96%   BMI 26.38 kg/m   Estimated body mass index is 26.38 kg/m  as calculated from the following:    Height as of 6/3/24: 1.549 m (5' 1\").    Weight as of this encounter: 63.3 kg (139 lb 9.6 oz). Body surface area is 1.65 meters squared.  No Pain (0) Comment: Data Unavailable   No LMP recorded (lmp unknown). Patient is postmenopausal.  Allergies reviewed: Yes  Medications reviewed: Yes    Medications: Medication refills not needed today.  Pharmacy name entered into [employer]:    [facility] - [place], [place] - [address]  [id number] - [place], [place] - [address] AT [facility]    Frailty Screening:   Is the patient here for a new oncology consult visit in cancer care? 2. No      Clinical concerns:   5 min was notified.      Mariela Lyn CMA            "

## 2024-07-08 NOTE — PATIENT INSTRUCTIONS
Continue pomalidomid, daratumumab and dexamethasone. (Pomalidomide 2 mg a day for 2 weeks on and 1 week off.  Dexamethasone 4 mg weekly)  Hold zometa.  See NP in 4 weeks.  See me in 8 weeks.  Continue prophylactic aspirin and acyclovir.

## 2024-07-08 NOTE — PROGRESS NOTES
HEMATOLOGY HISTORY: Mrs. Dunaway is a lady with multiple myeloma (IgG kappa). High risk, t(14;16).  1. On 05/01/2017, WBC of 3.4, hemoglobin of 10.2 and platelet of 154.  2. SPEP on 07/07/2017 revealed M-spike of 1.8.  3. Bone marrow biopsy on 07/12/2017 reveals kappa monotypic plasma cell population consistent with multiple myeloma.  Bone marrow is 70% cellular.  Plasma cell is 50-60%.     -There is gain of chromosome 1, 5, 9, 15, and 17.  There is translocation 14;16.   4.  On 07/18/2017:  -M-spike of 1.9.   -Immunofixation reveals monoclonal IgG kappa.    -IgG level of 2970.  IgA of 15 and IgM of 175.    -Kappa free light chain of 67.7.  Lambda free light chain of 1.42.  Ratio of kappa to lambda of 47.71.    -Beta 2 microglobulin of 3.4.   5. PET scan on 07/24/2017 reveals several focal areas of increased FDG uptake consistent with multiple myeloma.  There is probable epithelial cyst in tail of the pancreas.  No associated abnormal FDG activity.  Left thyroid cysts or nodules without any FDG activity.  There is a 0.3 cm left upper lobe lung nodule.   6.  Velcade, Revlimid and dexamethasone between on 08/14/2017 and 04/30/2018. Velcade stopped.   -Revlimid and dexamethasone continued.  -Revlimid held between 12/27/2021 and 03/08/2022.  -Daratumumab added on 11/14/2022.  -Changed to daratumumab, pomalidomide and dexamethasone on 06/12/2023.  7. CT chest, abdomen, and pelvis on 12/30/2021 does not reveal any malignancy.  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     SUBJECTIVE:  Ms. Dunaway is an 88-year-old female with multiple myeloma on daratumumab, pomalidomide and dexamethasone.  She takesn pomalidomide 2 mg a day for 2 weeks on and 1 week off.  Dose reduction due to side effects.  She takes dexamethasone 4 mg weekly.  She is tolerating treatment well.  Labs on 06/03/2024 revealed decrease in kappa free light chain.    Patient has some dental problem.  She needs dental  extraction.  Because of that we will hold Zometa.     Her overall condition is stable.  She has mild generalized weakness secondary to myeloma, her age and medications.  No worsening of weakness.  She can do activities of daily living.  She has arthritic joint pain. No worsening of pain.  She uses walker.     She has chronic dizziness.  No worsening of dizziness.  No recent falls.  No headache.  No chest pain.  No shortness of breath.  No abdominal pain. No nausea. No vomiting.  No urinary complaint.  She gets intermittent diarrhea.  No bleeding.  No fever or night sweats.  All other review of system is negative.      PHYSICAL EXAMINATION:   GENERAL:  Alert and oriented x 3.  Not in distress.  VITAL SIGNS:  Reviewed.       Rest of the system is not examined.     LABORATORY: CBC and CMP reviewed.      ASSESSMENT:    1.  An 88-year-old female with multiple myeloma on daratumumab, pomalidomide and dexamethasone.  Myeloma is stable.  2.  Mild normocytic anemia from myeloma and its treatment. Anemia is stable.  3.  Chronic dizziness.  Stable.  4.  Generalized weakness from myeloma, anemia, medication and old age.  Stable.  5.  Arthritic joint pain.  6.  Dental problem.     PLAN:  Continue pomalidomid, daratumumab and dexamethasone. (Pomalidomide 2 mg a day for 2 weeks on and 1 week off.  Dexamethasone 4 mg weekly)  Hold zometa.  See NP in 4 weeks.  See me in 8 weeks.  Continue prophylactic aspirin and acyclovir.     DISCUSSION:  1.  Patient's overall condition is stable.  She has no new symptoms.  Myeloma labs done in June reviewed.  It revealed decrease in kappa free light chain.  Explained to the patient that myeloma is stable.     Patient will continue on the current treatment with daratumumab, pomalidomide and dexamethasone.  She is on reduced dose of pomalidomide due to side effects.  She is tolerating it well.    2.  She will continue on prophylactic aspirin and acyclovir.    3.  Patient recently saw dentist.   Dental extraction is planned.  We will hold Zometa.    4.  Patient has generalized weakness and chronic dizziness.  She is at risk of fall.  Advised her to be careful.  She continues using walker.  She will continue on meclizine.     5.  She and her daughter had a few questions all answered.  I will see her in 2 months time.  In between she will see our ROSA MARIA.  Advised her to call us with any questions or concerns.     TOTAL VISIT TIME: 40 minutes.  Time spent in today's visit, review of chart/investigations today, monitoring for toxicity of high risk medications and documentation today.

## 2024-07-08 NOTE — PROGRESS NOTES
Infusion Nursing Note:  Quiana Dunaway presents today for C21D1 Darzalex faspro.    Patient seen by provider today: Yes: Dr. Parry.    present during visit today: Not Applicable.    Note: Per Dr. Parry after clinic visit, ok to proceed with treatment today. Zometa continues to be on hold. Patient verbalizes understanding of correct dosing/schedule of oral dexamethasone and pomalyst as ordered, denies need for refills at this time.      Intravenous Access:  No Intravenous access/labs at this visit.    Treatment Conditions:  Lab Results   Component Value Date    HGB 10.6 (L) 07/08/2024    WBC 4.4 07/08/2024    ANEU 2.1 06/03/2024    ANEUTAUTO 2.2 07/08/2024     07/08/2024        Lab Results   Component Value Date     07/08/2024    POTASSIUM 3.7 07/08/2024    MAG 2.4 (H) 05/26/2020    CR 0.56 07/08/2024    HUMBERTO 9.1 07/08/2024    BILITOTAL 0.5 07/08/2024    ALBUMIN 3.9 07/08/2024    ALT 29 07/08/2024    AST 18 07/08/2024     Results reviewed, labs MET treatment parameters, ok to proceed with treatment.      Post Infusion Assessment:  Patient tolerated injection without incident.  Site patent and intact, free from redness, edema or discomfort.       Discharge Plan:   Patient declined prescription refills.  Discharge instructions reviewed with: Patient and Family.  Patient and/or family verbalized understanding of discharge instructions and all questions answered.  AVS to patient via ShopLocketT.  Patient will return in 4 weeks for next appointment-not yet scheduled. Patient/daughter-in-law will follow up with scheduling.   Patient discharged in stable condition accompanied by: daughter-in-law.  Departure Mode: Ambulatory.      Amanda Howe RN

## 2024-07-08 NOTE — PROGRESS NOTES
Medical Assistant Note:  Quiana Dunaway presents today for blood draw.    Patient seen by provider today: Yes: nakita.   present during visit today: Not Applicable.    Concerns: No Concerns.    Procedure:  Lab draw site: right hnad, Needle type: bf, Gauge: 23.    Post Assessment:  Labs drawn without difficulty: Yes.    Discharge Plan:  Departure Mode: Ambulatory.    Face to Face Time: 5 min.    Mariela Lyn CMA

## 2024-07-09 LAB
ALBUMIN SERPL ELPH-MCNC: 3.7 G/DL (ref 3.7–5.1)
ALPHA1 GLOB SERPL ELPH-MCNC: 0.3 G/DL (ref 0.2–0.4)
ALPHA2 GLOB SERPL ELPH-MCNC: 0.7 G/DL (ref 0.5–0.9)
B-GLOBULIN SERPL ELPH-MCNC: 0.6 G/DL (ref 0.6–1)
GAMMA GLOB SERPL ELPH-MCNC: 0.9 G/DL (ref 0.7–1.6)
M PROTEIN SERPL ELPH-MCNC: 0.8 G/DL
PATH REPORT.COMMENTS IMP SPEC: ABNORMAL
PROT PATTERN SERPL ELPH-IMP: ABNORMAL

## 2024-07-11 ENCOUNTER — TELEPHONE (OUTPATIENT)
Dept: PHARMACY | Facility: CLINIC | Age: 89
End: 2024-07-11

## 2024-07-11 ENCOUNTER — OFFICE VISIT (OUTPATIENT)
Dept: FAMILY MEDICINE | Facility: CLINIC | Age: 89
End: 2024-07-11
Payer: MEDICARE

## 2024-07-11 VITALS
RESPIRATION RATE: 16 BRPM | DIASTOLIC BLOOD PRESSURE: 79 MMHG | OXYGEN SATURATION: 94 % | WEIGHT: 130 LBS | BODY MASS INDEX: 24.55 KG/M2 | HEART RATE: 87 BPM | SYSTOLIC BLOOD PRESSURE: 138 MMHG | HEIGHT: 61 IN | TEMPERATURE: 98.3 F

## 2024-07-11 DIAGNOSIS — H61.21 IMPACTED CERUMEN OF RIGHT EAR: Primary | ICD-10-CM

## 2024-07-11 DIAGNOSIS — I10 BENIGN ESSENTIAL HYPERTENSION: ICD-10-CM

## 2024-07-11 DIAGNOSIS — C90.00 MULTIPLE MYELOMA NOT HAVING ACHIEVED REMISSION (H): ICD-10-CM

## 2024-07-11 PROCEDURE — 69210 REMOVE IMPACTED EAR WAX UNI: CPT | Mod: RT | Performed by: INTERNAL MEDICINE

## 2024-07-11 PROCEDURE — 99212 OFFICE O/P EST SF 10 MIN: CPT | Mod: 25 | Performed by: INTERNAL MEDICINE

## 2024-07-11 ASSESSMENT — PAIN SCALES - GENERAL: PAINLEVEL: MODERATE PAIN (4)

## 2024-07-11 NOTE — PROGRESS NOTES
"  Assessment & Plan     Impacted cerumen of right ear  Cerumen impaction of the right external auditory canal, removed by Dr. Chung using a curette,  - REMOVE IMPACTED CERUMEN    Benign essential hypertension  Under good control on current medications    Multiple myeloma not having achieved remission (H)  Continue follow-up with oncology as directed            FUTURE APPOINTMENTS:       -She appreciates short-term follow-up as it helps her cope with stress of her cancer, plan to follow-up at the end of August or early September    Dirk Araya is a 88 year old, presenting for the following health issues:  Follow Up    History of Present Illness       Hypertension: She presents for follow up of hypertension.  She does not check blood pressure  regularly outside of the clinic. Outpatient blood pressures have not been over 140/90. She does not follow a low salt diet.     She eats 0-1 servings of fruits and vegetables daily.She consumes 0 sweetened beverage(s) daily.She exercises with enough effort to increase her heart rate 9 or less minutes per day.  She exercises with enough effort to increase her heart rate 3 or less days per week.   She is taking medications regularly.       Sense of plugging in her right ear present for several weeks      Objective    /79 (BP Location: Right arm, Patient Position: Sitting, Cuff Size: Adult Regular)   Pulse 87   Temp 98.3  F (36.8  C) (Tympanic)   Resp 16   Ht 1.549 m (5' 1\")   Wt 59 kg (130 lb)   LMP  (LMP Unknown)   SpO2 94%   BMI 24.56 kg/m    Body mass index is 24.56 kg/m .  Physical Exam   Stable appearing, thin, chronically ill older female in no acute distress.  HEENT: Cerumen impaction of the right external auditory canal, removed by Dr. Chung using a curette, after removal, the right tympanic membrane was normal            Signed Electronically by: César Chung MD    "

## 2024-07-11 NOTE — TELEPHONE ENCOUNTER
Oral Chemotherapy Monitoring Program   Medication: Pomalyst  Rx: 2mg PO daily on days 1 through 14 of 21 day cycle   Auth #:  [id number] obtained on 7/11/24  Risk Category: adult female of non child bearing potential  Routine survey questions reviewed.   Rx to be Escribed to [facility]     Terrie Bowen [facility] Pharmacy Liaison  [phone]

## 2024-07-18 DIAGNOSIS — R42 DIZZINESS: ICD-10-CM

## 2024-07-18 RX ORDER — MECLIZINE HYDROCHLORIDE 25 MG/1
25 TABLET ORAL 3 TIMES DAILY PRN
Qty: 270 TABLET | Refills: 2 | Status: SHIPPED | OUTPATIENT
Start: 2024-07-18

## 2024-07-26 ENCOUNTER — ANCILLARY PROCEDURE (OUTPATIENT)
Dept: CARDIOLOGY | Facility: CLINIC | Age: 89
End: 2024-07-26
Attending: INTERNAL MEDICINE
Payer: MEDICARE

## 2024-07-26 DIAGNOSIS — I49.5 SICK SINUS SYNDROME (H): ICD-10-CM

## 2024-07-26 DIAGNOSIS — Z95.0 CARDIAC PACEMAKER IN SITU: ICD-10-CM

## 2024-07-26 LAB
MDC_IDC_EPISODE_DTM: NORMAL
MDC_IDC_EPISODE_DURATION: 1006 S
MDC_IDC_EPISODE_DURATION: 1048 S
MDC_IDC_EPISODE_DURATION: 1146 S
MDC_IDC_EPISODE_DURATION: 1153 S
MDC_IDC_EPISODE_DURATION: 1437 S
MDC_IDC_EPISODE_DURATION: 1488 S
MDC_IDC_EPISODE_DURATION: 184 S
MDC_IDC_EPISODE_DURATION: 187 S
MDC_IDC_EPISODE_DURATION: 191 S
MDC_IDC_EPISODE_DURATION: 194 S
MDC_IDC_EPISODE_DURATION: 301 S
MDC_IDC_EPISODE_DURATION: 369 S
MDC_IDC_EPISODE_DURATION: 3784 S
MDC_IDC_EPISODE_DURATION: 42 S
MDC_IDC_EPISODE_DURATION: 446 S
MDC_IDC_EPISODE_DURATION: 488 S
MDC_IDC_EPISODE_DURATION: 507 S
MDC_IDC_EPISODE_DURATION: 54 S
MDC_IDC_EPISODE_DURATION: 60 S
MDC_IDC_EPISODE_DURATION: 670 S
MDC_IDC_EPISODE_DURATION: 719 S
MDC_IDC_EPISODE_DURATION: 827 S
MDC_IDC_EPISODE_DURATION: 89 S
MDC_IDC_EPISODE_DURATION: 892 S
MDC_IDC_EPISODE_ID: 172
MDC_IDC_EPISODE_ID: 173
MDC_IDC_EPISODE_ID: 174
MDC_IDC_EPISODE_ID: 175
MDC_IDC_EPISODE_ID: 176
MDC_IDC_EPISODE_ID: 177
MDC_IDC_EPISODE_ID: 178
MDC_IDC_EPISODE_ID: 179
MDC_IDC_EPISODE_ID: 180
MDC_IDC_EPISODE_ID: 181
MDC_IDC_EPISODE_ID: 182
MDC_IDC_EPISODE_ID: 183
MDC_IDC_EPISODE_ID: 184
MDC_IDC_EPISODE_ID: 185
MDC_IDC_EPISODE_ID: 186
MDC_IDC_EPISODE_ID: 187
MDC_IDC_EPISODE_ID: 188
MDC_IDC_EPISODE_ID: 189
MDC_IDC_EPISODE_ID: 190
MDC_IDC_EPISODE_ID: 191
MDC_IDC_EPISODE_ID: 192
MDC_IDC_EPISODE_ID: 193
MDC_IDC_EPISODE_ID: 194
MDC_IDC_EPISODE_ID: 195
MDC_IDC_EPISODE_TYPE: NORMAL
MDC_IDC_LEAD_CONNECTION_STATUS: NORMAL
MDC_IDC_LEAD_CONNECTION_STATUS: NORMAL
MDC_IDC_LEAD_IMPLANT_DT: NORMAL
MDC_IDC_LEAD_IMPLANT_DT: NORMAL
MDC_IDC_LEAD_LOCATION: NORMAL
MDC_IDC_LEAD_LOCATION: NORMAL
MDC_IDC_LEAD_LOCATION_DETAIL_1: NORMAL
MDC_IDC_LEAD_LOCATION_DETAIL_1: NORMAL
MDC_IDC_LEAD_MFG: NORMAL
MDC_IDC_LEAD_MFG: NORMAL
MDC_IDC_LEAD_MODEL: NORMAL
MDC_IDC_LEAD_MODEL: NORMAL
MDC_IDC_LEAD_POLARITY_TYPE: NORMAL
MDC_IDC_LEAD_POLARITY_TYPE: NORMAL
MDC_IDC_LEAD_SERIAL: NORMAL
MDC_IDC_LEAD_SERIAL: NORMAL
MDC_IDC_MSMT_BATTERY_DTM: NORMAL
MDC_IDC_MSMT_BATTERY_REMAINING_LONGEVITY: 13 MO
MDC_IDC_MSMT_BATTERY_RRT_TRIGGER: 2.83
MDC_IDC_MSMT_BATTERY_STATUS: NORMAL
MDC_IDC_MSMT_BATTERY_VOLTAGE: 2.89 V
MDC_IDC_MSMT_LEADCHNL_RA_IMPEDANCE_VALUE: 323 OHM
MDC_IDC_MSMT_LEADCHNL_RA_IMPEDANCE_VALUE: 342 OHM
MDC_IDC_MSMT_LEADCHNL_RA_PACING_THRESHOLD_AMPLITUDE: 0.5 V
MDC_IDC_MSMT_LEADCHNL_RA_PACING_THRESHOLD_PULSEWIDTH: 0.4 MS
MDC_IDC_MSMT_LEADCHNL_RA_SENSING_INTR_AMPL: 2.25 MV
MDC_IDC_MSMT_LEADCHNL_RA_SENSING_INTR_AMPL: 2.25 MV
MDC_IDC_MSMT_LEADCHNL_RV_IMPEDANCE_VALUE: 361 OHM
MDC_IDC_MSMT_LEADCHNL_RV_IMPEDANCE_VALUE: 456 OHM
MDC_IDC_MSMT_LEADCHNL_RV_PACING_THRESHOLD_AMPLITUDE: 1.25 V
MDC_IDC_MSMT_LEADCHNL_RV_PACING_THRESHOLD_PULSEWIDTH: 0.4 MS
MDC_IDC_MSMT_LEADCHNL_RV_SENSING_INTR_AMPL: 8.88 MV
MDC_IDC_MSMT_LEADCHNL_RV_SENSING_INTR_AMPL: 8.88 MV
MDC_IDC_PG_IMPLANT_DTM: NORMAL
MDC_IDC_PG_MFG: NORMAL
MDC_IDC_PG_MODEL: NORMAL
MDC_IDC_PG_SERIAL: NORMAL
MDC_IDC_PG_TYPE: NORMAL
MDC_IDC_SESS_CLINIC_NAME: NORMAL
MDC_IDC_SESS_DTM: NORMAL
MDC_IDC_SESS_TYPE: NORMAL
MDC_IDC_SET_BRADY_AT_MODE_SWITCH_RATE: 171 {BEATS}/MIN
MDC_IDC_SET_BRADY_HYSTRATE: NORMAL
MDC_IDC_SET_BRADY_LOWRATE: 60 {BEATS}/MIN
MDC_IDC_SET_BRADY_MAX_SENSOR_RATE: 120 {BEATS}/MIN
MDC_IDC_SET_BRADY_MAX_TRACKING_RATE: 120 {BEATS}/MIN
MDC_IDC_SET_BRADY_MODE: NORMAL
MDC_IDC_SET_BRADY_PAV_DELAY_LOW: 200 MS
MDC_IDC_SET_BRADY_SAV_DELAY_LOW: 180 MS
MDC_IDC_SET_LEADCHNL_RA_PACING_AMPLITUDE: 1.5 V
MDC_IDC_SET_LEADCHNL_RA_PACING_ANODE_ELECTRODE_1: NORMAL
MDC_IDC_SET_LEADCHNL_RA_PACING_ANODE_LOCATION_1: NORMAL
MDC_IDC_SET_LEADCHNL_RA_PACING_CAPTURE_MODE: NORMAL
MDC_IDC_SET_LEADCHNL_RA_PACING_CATHODE_ELECTRODE_1: NORMAL
MDC_IDC_SET_LEADCHNL_RA_PACING_CATHODE_LOCATION_1: NORMAL
MDC_IDC_SET_LEADCHNL_RA_PACING_POLARITY: NORMAL
MDC_IDC_SET_LEADCHNL_RA_PACING_PULSEWIDTH: 0.4 MS
MDC_IDC_SET_LEADCHNL_RA_SENSING_ANODE_ELECTRODE_1: NORMAL
MDC_IDC_SET_LEADCHNL_RA_SENSING_ANODE_LOCATION_1: NORMAL
MDC_IDC_SET_LEADCHNL_RA_SENSING_CATHODE_ELECTRODE_1: NORMAL
MDC_IDC_SET_LEADCHNL_RA_SENSING_CATHODE_LOCATION_1: NORMAL
MDC_IDC_SET_LEADCHNL_RA_SENSING_POLARITY: NORMAL
MDC_IDC_SET_LEADCHNL_RA_SENSING_SENSITIVITY: 0.3 MV
MDC_IDC_SET_LEADCHNL_RV_PACING_AMPLITUDE: 4.25 V
MDC_IDC_SET_LEADCHNL_RV_PACING_ANODE_ELECTRODE_1: NORMAL
MDC_IDC_SET_LEADCHNL_RV_PACING_ANODE_LOCATION_1: NORMAL
MDC_IDC_SET_LEADCHNL_RV_PACING_CAPTURE_MODE: NORMAL
MDC_IDC_SET_LEADCHNL_RV_PACING_CATHODE_ELECTRODE_1: NORMAL
MDC_IDC_SET_LEADCHNL_RV_PACING_CATHODE_LOCATION_1: NORMAL
MDC_IDC_SET_LEADCHNL_RV_PACING_POLARITY: NORMAL
MDC_IDC_SET_LEADCHNL_RV_PACING_PULSEWIDTH: 0.4 MS
MDC_IDC_SET_LEADCHNL_RV_SENSING_ANODE_ELECTRODE_1: NORMAL
MDC_IDC_SET_LEADCHNL_RV_SENSING_ANODE_LOCATION_1: NORMAL
MDC_IDC_SET_LEADCHNL_RV_SENSING_CATHODE_ELECTRODE_1: NORMAL
MDC_IDC_SET_LEADCHNL_RV_SENSING_CATHODE_LOCATION_1: NORMAL
MDC_IDC_SET_LEADCHNL_RV_SENSING_POLARITY: NORMAL
MDC_IDC_SET_LEADCHNL_RV_SENSING_SENSITIVITY: 0.9 MV
MDC_IDC_SET_ZONE_DETECTION_INTERVAL: 350 MS
MDC_IDC_SET_ZONE_DETECTION_INTERVAL: 400 MS
MDC_IDC_SET_ZONE_STATUS: NORMAL
MDC_IDC_SET_ZONE_STATUS: NORMAL
MDC_IDC_SET_ZONE_TYPE: NORMAL
MDC_IDC_SET_ZONE_VENDOR_TYPE: NORMAL
MDC_IDC_STAT_AT_BURDEN_PERCENT: 0.7 %
MDC_IDC_STAT_AT_DTM_END: NORMAL
MDC_IDC_STAT_AT_DTM_START: NORMAL
MDC_IDC_STAT_BRADY_AP_VP_PERCENT: 92.77 %
MDC_IDC_STAT_BRADY_AP_VS_PERCENT: 0.11 %
MDC_IDC_STAT_BRADY_AS_VP_PERCENT: 6.89 %
MDC_IDC_STAT_BRADY_AS_VS_PERCENT: 0.22 %
MDC_IDC_STAT_BRADY_DTM_END: NORMAL
MDC_IDC_STAT_BRADY_DTM_START: NORMAL
MDC_IDC_STAT_BRADY_RA_PERCENT_PACED: 92.34 %
MDC_IDC_STAT_BRADY_RV_PERCENT_PACED: 99.49 %
MDC_IDC_STAT_EPISODE_RECENT_COUNT: 0
MDC_IDC_STAT_EPISODE_RECENT_COUNT: 24
MDC_IDC_STAT_EPISODE_RECENT_COUNT_DTM_END: NORMAL
MDC_IDC_STAT_EPISODE_RECENT_COUNT_DTM_START: NORMAL
MDC_IDC_STAT_EPISODE_TOTAL_COUNT: 0
MDC_IDC_STAT_EPISODE_TOTAL_COUNT: 0
MDC_IDC_STAT_EPISODE_TOTAL_COUNT: 14
MDC_IDC_STAT_EPISODE_TOTAL_COUNT: 178
MDC_IDC_STAT_EPISODE_TOTAL_COUNT_DTM_END: NORMAL
MDC_IDC_STAT_EPISODE_TOTAL_COUNT_DTM_START: NORMAL
MDC_IDC_STAT_EPISODE_TYPE: NORMAL
MDC_IDC_STAT_TACHYTHERAPY_RECENT_DTM_END: NORMAL
MDC_IDC_STAT_TACHYTHERAPY_RECENT_DTM_START: NORMAL
MDC_IDC_STAT_TACHYTHERAPY_TOTAL_DTM_END: NORMAL
MDC_IDC_STAT_TACHYTHERAPY_TOTAL_DTM_START: NORMAL

## 2024-07-26 PROCEDURE — 93296 REM INTERROG EVL PM/IDS: CPT | Performed by: INTERNAL MEDICINE

## 2024-07-26 PROCEDURE — 93294 REM INTERROG EVL PM/LDLS PM: CPT | Performed by: INTERNAL MEDICINE

## 2024-08-02 ENCOUNTER — TELEPHONE (OUTPATIENT)
Dept: PHARMACY | Facility: CLINIC | Age: 89
End: 2024-08-02
Payer: MEDICARE

## 2024-08-02 DIAGNOSIS — C90.00 MULTIPLE MYELOMA NOT HAVING ACHIEVED REMISSION (H): Primary | ICD-10-CM

## 2024-08-02 NOTE — TELEPHONE ENCOUNTER
Oral Chemotherapy Monitoring Program   Medication: Pomalyst  Rx: 2mg PO daily on days 1 through 14 of 21 day cycle   Auth #:  [id number]  Risk Category: adult female of non child bearing potential  Routine survey questions reviewed.   Rx to be Escribed to [facility]     Terrie Bowen [facility] Pharmacy Liaison  [phone]

## 2024-08-04 ENCOUNTER — INFUSION THERAPY VISIT (OUTPATIENT)
Dept: INFUSION THERAPY | Facility: CLINIC | Age: 89
End: 2024-08-04
Attending: INTERNAL MEDICINE
Payer: MEDICARE

## 2024-08-04 VITALS — HEART RATE: 68 BPM | OXYGEN SATURATION: 94 % | TEMPERATURE: 98.1 F

## 2024-08-04 DIAGNOSIS — C90.00 MULTIPLE MYELOMA NOT HAVING ACHIEVED REMISSION (H): Primary | ICD-10-CM

## 2024-08-04 LAB
ALBUMIN SERPL BCG-MCNC: 4 G/DL (ref 3.5–5.2)
ALP SERPL-CCNC: 96 U/L (ref 40–150)
ALT SERPL W P-5'-P-CCNC: 9 U/L (ref 0–50)
ANION GAP SERPL CALCULATED.3IONS-SCNC: 6 MMOL/L (ref 7–15)
AST SERPL W P-5'-P-CCNC: 12 U/L (ref 0–45)
BASOPHILS # BLD AUTO: 0.1 10E3/UL (ref 0–0.2)
BASOPHILS NFR BLD AUTO: 1 %
BILIRUB SERPL-MCNC: 0.5 MG/DL
BUN SERPL-MCNC: 12.6 MG/DL (ref 8–23)
CALCIUM SERPL-MCNC: 9 MG/DL (ref 8.8–10.4)
CHLORIDE SERPL-SCNC: 108 MMOL/L (ref 98–107)
CREAT SERPL-MCNC: 0.63 MG/DL (ref 0.51–0.95)
EGFRCR SERPLBLD CKD-EPI 2021: 85 ML/MIN/1.73M2
EOSINOPHIL # BLD AUTO: 0.2 10E3/UL (ref 0–0.7)
EOSINOPHIL NFR BLD AUTO: 4 %
ERYTHROCYTE [DISTWIDTH] IN BLOOD BY AUTOMATED COUNT: 14.6 % (ref 10–15)
GLUCOSE SERPL-MCNC: 95 MG/DL (ref 70–99)
HCO3 SERPL-SCNC: 28 MMOL/L (ref 22–29)
HCT VFR BLD AUTO: 34 % (ref 35–47)
HGB BLD-MCNC: 11.2 G/DL (ref 11.7–15.7)
IMM GRANULOCYTES # BLD: 0 10E3/UL
IMM GRANULOCYTES NFR BLD: 1 %
LYMPHOCYTES # BLD AUTO: 1.2 10E3/UL (ref 0.8–5.3)
LYMPHOCYTES NFR BLD AUTO: 28 %
MCH RBC QN AUTO: 30 PG (ref 26.5–33)
MCHC RBC AUTO-ENTMCNC: 32.9 G/DL (ref 31.5–36.5)
MCV RBC AUTO: 91 FL (ref 78–100)
MONOCYTES # BLD AUTO: 0.6 10E3/UL (ref 0–1.3)
MONOCYTES NFR BLD AUTO: 15 %
NEUTROPHILS # BLD AUTO: 2.1 10E3/UL (ref 1.6–8.3)
NEUTROPHILS NFR BLD AUTO: 52 %
NRBC # BLD AUTO: 0 10E3/UL
NRBC BLD AUTO-RTO: 0 /100
PLATELET # BLD AUTO: 111 10E3/UL (ref 150–450)
POTASSIUM SERPL-SCNC: 4.1 MMOL/L (ref 3.4–5.3)
PROT SERPL-MCNC: 6.8 G/DL (ref 6.4–8.3)
RBC # BLD AUTO: 3.73 10E6/UL (ref 3.8–5.2)
SODIUM SERPL-SCNC: 142 MMOL/L (ref 135–145)
TOTAL PROTEIN SERUM FOR ELP: 6.4 G/DL (ref 6.4–8.3)
WBC # BLD AUTO: 4.2 10E3/UL (ref 4–11)

## 2024-08-04 PROCEDURE — 36415 COLL VENOUS BLD VENIPUNCTURE: CPT

## 2024-08-04 PROCEDURE — 82784 ASSAY IGA/IGD/IGG/IGM EACH: CPT | Performed by: INTERNAL MEDICINE

## 2024-08-04 PROCEDURE — 85004 AUTOMATED DIFF WBC COUNT: CPT | Performed by: INTERNAL MEDICINE

## 2024-08-04 PROCEDURE — 83521 IG LIGHT CHAINS FREE EACH: CPT | Performed by: INTERNAL MEDICINE

## 2024-08-04 PROCEDURE — 84155 ASSAY OF PROTEIN SERUM: CPT | Performed by: INTERNAL MEDICINE

## 2024-08-04 PROCEDURE — 84165 PROTEIN E-PHORESIS SERUM: CPT | Mod: 26 | Performed by: PATHOLOGY

## 2024-08-04 PROCEDURE — 84165 PROTEIN E-PHORESIS SERUM: CPT | Mod: TC | Performed by: PATHOLOGY

## 2024-08-04 NOTE — PROGRESS NOTES
Infusion Nursing Note:  Quiana Dunaway presents today for labs.    Patient seen by provider today: No   present during visit today: Not Applicable.    Note: Pt reports to having cough which started last night. Denies fevers/chills. O2 sat 94%, no respiratory distress noted. Advised pt take over the counter cough suppressant, should go to ER if fevers/chills or SOB. Pt is coming back tomorrow for her tx.  .      Intravenous Access:  Lab draw site LAC, Needle type butterfly, Gauge 21.  Labs drawn without difficulty.      Discharge Plan:   Discharge instructions reviewed with: Patient and Family.  Patient and/or family verbalized understanding of discharge instructions and all questions answered.  AVS to patient via Boosted BoardsT.  Patient will return tomorrow for next appointment.   Patient discharged in stable condition accompanied by: self and daughter.  Departure Mode: Ambulatory with walker.      Twyla Duncan RN

## 2024-08-05 ENCOUNTER — TELEPHONE (OUTPATIENT)
Dept: ONCOLOGY | Facility: CLINIC | Age: 89
End: 2024-08-05
Payer: MEDICARE

## 2024-08-05 ENCOUNTER — DOCUMENTATION ONLY (OUTPATIENT)
Dept: PHARMACY | Facility: CLINIC | Age: 89
End: 2024-08-05
Payer: MEDICARE

## 2024-08-05 LAB
ALBUMIN SERPL ELPH-MCNC: 3.9 G/DL (ref 3.7–5.1)
ALPHA1 GLOB SERPL ELPH-MCNC: 0.3 G/DL (ref 0.2–0.4)
ALPHA2 GLOB SERPL ELPH-MCNC: 0.6 G/DL (ref 0.5–0.9)
B-GLOBULIN SERPL ELPH-MCNC: 0.6 G/DL (ref 0.6–1)
GAMMA GLOB SERPL ELPH-MCNC: 0.9 G/DL (ref 0.7–1.6)
IGG SERPL-MCNC: 994 MG/DL (ref 610–1616)
KAPPA LC FREE SER-MCNC: 55.06 MG/DL (ref 0.33–1.94)
KAPPA LC FREE/LAMBDA FREE SER NEPH: 100.11 {RATIO} (ref 0.26–1.65)
LAMBDA LC FREE SERPL-MCNC: 0.55 MG/DL (ref 0.57–2.63)
M PROTEIN SERPL ELPH-MCNC: 0.7 G/DL
PROT PATTERN SERPL ELPH-IMP: ABNORMAL

## 2024-08-05 NOTE — TELEPHONE ENCOUNTER
Regi called to report that Quiana has a cough and a runny nose. Symptoms started yesterday. Denies fever as of yesterday, but thermometer is not working today. She is coughing up a small amount of mucus. Cough is worse when she is laying down. Granddaughter recently had a cold and Quiana was around her. Overall she is feeling okay.     Quiana has an appointment today at 3:30 for Darzalex Faspro and they are wondering if it is still okay for her to come in.    Will route to Dr. Parry for recommendations.    Stacia Roque, RN  Triage Nurse Advisor  [facility]   Replaced by [facility]

## 2024-08-05 NOTE — TELEPHONE ENCOUNTER
Per Petra, RNCC, Dr. Parry would like patient to hold Darsalex for 1 week. Per Petra patient will be put on waitlist to reschedule. Called Regi and informed her that someone would call her to schedule.

## 2024-08-05 NOTE — PROGRESS NOTES
Oral Chemotherapy Monitoring Program  Lab Follow Up    Reviewed lab results from 8/4/24.        12/26/2023    11:00 AM 1/11/2024     1:00 PM 2/2/2024    11:00 AM 3/19/2024     2:00 PM 6/19/2024    11:00 AM 8/2/2024     8:00 AM 8/5/2024     8:00 AM   ORAL CHEMOTHERAPY   Assessment Type Refill Refill Refill Chart Review;Lab Monitoring Refill Chart Review;Refill Lab Monitoring   Diagnosis Code Multiple Myeloma Multiple Myeloma Multiple Myeloma Multiple Myeloma Multiple Myeloma Multiple Myeloma Multiple Myeloma   Providers Dr Sohan Parry   Clinic Name/Location [facility]   Drug Name Pomalyst (pomalidomide) Pomalyst (pomalidomide) Pomalyst (pomalidomide) Pomalyst (pomalidomide) Pomalyst (pomalidomide) Pomalyst (pomalidomide) Pomalyst (pomalidomide)   Dose 2 mg 2 mg 2 mg 2 mg 2 mg 2 mg 2 mg   Current Schedule Daily Daily Daily Daily Daily Daily Daily   Cycle Details 2 weeks on, 1 week off 2 weeks on, 1 week off 2 weeks on, 1 week off Drug on Hold 2 weeks on, 1 week off 2 weeks on, 1 week off 2 weeks on, 1 week off   Planned next cycle start date      8/11/2024 8/11/2024   Adverse Effects       Anemia;Thrombocytopenia   Anemia       Grade 1   Pharmacist Intervention(anemia)       No   Thrombocytopenia       Grade 1   Pharmacist Intervention(thrombocytopenia)       No   Any new drug interactions?   No   No    Is the dose as ordered appropriate for the patient?   Yes   Yes Yes       Labs:  _  Result Component Current Result Ref Range   Sodium 142 (8/4/2024) 135 - 145 mmol/L     _  Result Component Current Result Ref Range   Potassium 4.1 (8/4/2024) 3.4 - 5.3 mmol/L     _  Result Component Current Result Ref Range   Calcium 9.0 (8/4/2024) 8.8 - 10.4 mg/dL     No results found for Mag within last 30 days.     No results found for Phos within last 30 days.     _  Result Component Current Result Ref Range   Albumin 4.0  (8/4/2024) 3.5 - 5.2 g/dL     _  Result Component Current Result Ref Range   Urea Nitrogen 12.6 (8/4/2024) 8.0 - 23.0 mg/dL     _  Result Component Current Result Ref Range   Creatinine 0.63 (8/4/2024) 0.51 - 0.95 mg/dL     _  Result Component Current Result Ref Range   AST 12 (8/4/2024) 0 - 45 U/L     _  Result Component Current Result Ref Range   ALT 9 (8/4/2024) 0 - 50 U/L     _  Result Component Current Result Ref Range   Bilirubin Total 0.5 (8/4/2024) <=1.2 mg/dL     _  Result Component Current Result Ref Range   WBC Count 4.2 (8/4/2024) 4.0 - 11.0 10e3/uL     _  Result Component Current Result Ref Range   Hemoglobin 11.2 (L) (8/4/2024) 11.7 - 15.7 g/dL     _  Result Component Current Result Ref Range   Platelet Count 111 (L) (8/4/2024) 150 - 450 10e3/uL     No results found for ANC within last 30 days.     _  Result Component Current Result Ref Range   Absolute Neutrophils 2.1 (8/4/2024) 1.6 - 8.3 10e3/uL        Assessment  Patient has grade 1 thrombocytopenia and grade 1 anemia. No adjustments are needed at this time.     Plan   Continue Pomalyst   Labs 9/3     Jah Reynolds  Pharmacy Intern   [facility]   [phone]

## 2024-08-06 NOTE — RESULT ENCOUNTER NOTE
Dear Ms. Dunaway,    Blood test is overall stable.    Please, call me with any questions.    Alex Parry MD

## 2024-08-15 ENCOUNTER — INFUSION THERAPY VISIT (OUTPATIENT)
Dept: INFUSION THERAPY | Facility: CLINIC | Age: 89
End: 2024-08-15
Attending: INTERNAL MEDICINE
Payer: MEDICARE

## 2024-08-15 VITALS
TEMPERATURE: 98.4 F | DIASTOLIC BLOOD PRESSURE: 76 MMHG | OXYGEN SATURATION: 95 % | HEART RATE: 67 BPM | SYSTOLIC BLOOD PRESSURE: 134 MMHG | RESPIRATION RATE: 18 BRPM

## 2024-08-15 DIAGNOSIS — C90.00 MULTIPLE MYELOMA NOT HAVING ACHIEVED REMISSION (H): Primary | ICD-10-CM

## 2024-08-15 LAB
BASOPHILS # BLD AUTO: 0 10E3/UL (ref 0–0.2)
BASOPHILS NFR BLD AUTO: 1 %
EOSINOPHIL # BLD AUTO: 0.2 10E3/UL (ref 0–0.7)
EOSINOPHIL NFR BLD AUTO: 5 %
ERYTHROCYTE [DISTWIDTH] IN BLOOD BY AUTOMATED COUNT: 14.9 % (ref 10–15)
HCT VFR BLD AUTO: 32.2 % (ref 35–47)
HGB BLD-MCNC: 10.5 G/DL (ref 11.7–15.7)
IMM GRANULOCYTES # BLD: 0 10E3/UL
IMM GRANULOCYTES NFR BLD: 0 %
LYMPHOCYTES # BLD AUTO: 1.7 10E3/UL (ref 0.8–5.3)
LYMPHOCYTES NFR BLD AUTO: 41 %
MCH RBC QN AUTO: 29.4 PG (ref 26.5–33)
MCHC RBC AUTO-ENTMCNC: 32.6 G/DL (ref 31.5–36.5)
MCV RBC AUTO: 90 FL (ref 78–100)
MONOCYTES # BLD AUTO: 0.9 10E3/UL (ref 0–1.3)
MONOCYTES NFR BLD AUTO: 20 %
NEUTROPHILS # BLD AUTO: 1.4 10E3/UL (ref 1.6–8.3)
NEUTROPHILS NFR BLD AUTO: 32 %
NRBC # BLD AUTO: 0 10E3/UL
NRBC BLD AUTO-RTO: 0 /100
PLATELET # BLD AUTO: 136 10E3/UL (ref 150–450)
RBC # BLD AUTO: 3.57 10E6/UL (ref 3.8–5.2)
WBC # BLD AUTO: 4.2 10E3/UL (ref 4–11)

## 2024-08-15 PROCEDURE — 250N000011 HC RX IP 250 OP 636: Performed by: INTERNAL MEDICINE

## 2024-08-15 PROCEDURE — 85025 COMPLETE CBC W/AUTO DIFF WBC: CPT | Performed by: INTERNAL MEDICINE

## 2024-08-15 PROCEDURE — 36415 COLL VENOUS BLD VENIPUNCTURE: CPT

## 2024-08-15 PROCEDURE — 96401 CHEMO ANTI-NEOPL SQ/IM: CPT

## 2024-08-15 RX ADMIN — DARATUMUMAB AND HYALURONIDASE-FIHJ (HUMAN RECOMBINANT) 1800 MG: 1800; 30000 INJECTION SUBCUTANEOUS at 10:44

## 2024-08-15 NOTE — PROGRESS NOTES
Infusion Nursing Note:  Quiana Dunaway presents today for labs/darzalex faspro.    Patient seen by provider today: No   present during visit today: Not Applicable.    Note: N/A.      Intravenous Access:  Lab draw site right arm, Needle type butterfly, Gauge 23.  Labs drawn without difficulty.    Treatment Conditions:  Lab Results   Component Value Date    HGB 10.5 (L) 08/15/2024    WBC 4.2 08/15/2024    ANEU 2.1 06/03/2024    ANEUTAUTO 1.4 (L) 08/15/2024     (L) 08/15/2024        Results reviewed, labs MET treatment parameters, ok to proceed with treatment.      Post Infusion Assessment:  Patient tolerated injection without incident.       Discharge Plan:   Discharge instructions reviewed with: Patient.  Patient and/or family verbalized understanding of discharge instructions and all questions answered.  AVS to patient via SalorixHART.  Patient will return in 1 month for next appointment.   Patient discharged in stable condition accompanied by: self and daughter.  Departure Mode: Ambulatory.      Jessica Shetty RN

## 2024-08-22 ENCOUNTER — TELEPHONE (OUTPATIENT)
Dept: ONCOLOGY | Facility: CLINIC | Age: 89
End: 2024-08-22
Payer: MEDICARE

## 2024-08-22 DIAGNOSIS — C90.00 MULTIPLE MYELOMA NOT HAVING ACHIEVED REMISSION (H): Primary | ICD-10-CM

## 2024-08-22 NOTE — ORAL ONC MGMT
Oral Chemotherapy Monitoring Program     Medication: Pomalyst  Rx: 2mg PO daily on days 1 through 14 of 21 day cycle   [facility] Auth # [id number]  Routine survey questions reviewed  Rx to be Escribed to [facility]

## 2024-08-26 DIAGNOSIS — C90.00 MULTIPLE MYELOMA NOT HAVING ACHIEVED REMISSION (H): Primary | ICD-10-CM

## 2024-08-27 ENCOUNTER — ANCILLARY PROCEDURE (OUTPATIENT)
Dept: CARDIOLOGY | Facility: CLINIC | Age: 89
End: 2024-08-27
Attending: INTERNAL MEDICINE
Payer: MEDICARE

## 2024-08-27 DIAGNOSIS — I49.5 SICK SINUS SYNDROME (H): ICD-10-CM

## 2024-08-27 DIAGNOSIS — Z95.0 CARDIAC PACEMAKER IN SITU: ICD-10-CM

## 2024-08-29 ENCOUNTER — PATIENT OUTREACH (OUTPATIENT)
Dept: CARE COORDINATION | Facility: CLINIC | Age: 89
End: 2024-08-29
Payer: MEDICARE

## 2024-08-29 NOTE — MR AVS SNAPSHOT
After Visit Summary   11/20/2017    Quiana Dunaway    MRN: [id number]           Patient Information     Date Of Birth          1935        Visit Information        Provider Department      11/20/2017 2:00 PM SH INFUSION CHAIR 2 [facility]        Today's Diagnoses     Multiple myeloma not having achieved remission (H)    -  1       Follow-ups after your visit        Your next 10 appointments already scheduled     Nov 27, 2017  2:00 PM CST   Level 2 with SH INFUSION CHAIR 2   [facility] ([facility])    [place] [id number]  [place] [address]   [phone]            Dec 04, 2017  1:30 PM CST   Level 2 with SH INFUSION CHAIR 9   [facility] ([facility])    [facility]  [address]  [place] [address]   [phone]            Dec 04, 2017  2:00 PM CST   Return Visit with Alex Parry MD   [facility])    [facility]  [address]  [place] [address]   [phone]            Dec 06, 2017  2:00 PM CST   Remote PPM Check with RIOS TECH1   [facility] ([facility])    [address]  [place] [address] [phone]           This appointment is for a remote check of your pacemaker.  This is not an appointment at the office.            Dec 11, 2017  1:00 PM CST   Level 2 with SH INFUSION CHAIR 6   [facility] ([facility])    [facility]  [address]  [place] [address]   [phone]            Dec 18, 2017  2:00 PM CST   Level 2 with SH INFUSION CHAIR 13   [facility] ([facility])    [facility]  [address]  "[place] [address] [phone]              Who to contact     If you have questions or need follow up information about today's clinic visit or your schedule please contact [facility] directly at [phone].  Normal or non-critical lab and imaging results will be communicated to you by MyChart, letter or phone within 4 business days after the clinic has received the results. If you do not hear from us within 7 days, please contact the clinic through MyChart or phone. If you have a critical or abnormal lab result, we will notify you by phone as soon as possible.  Submit refill requests through Manta or call your pharmacy and they will forward the refill request to us. Please allow 3 business days for your refill to be completed.          Additional Information About Your Visit        MyChart Information     Manta lets you send messages to your doctor, view your test results, renew your prescriptions, schedule appointments and more. To sign up, go to [email or web address] . Click on \"Log in\" on the left side of the screen, which will take you to the Welcome page. Then click on \"Sign up Now\" on the right side of the page.     You will be asked to enter the access code listed below, as well as some personal information. Please follow the directions to create your username and password.     Your access code is: [email or web address]  Expires: 1/15/2018  1:13 PM     Your access code will  in 90 days. If you need help or a new code, please call your [facility] or [phone].        Care EveryWhere ID     This is your Care EveryWhere ID. This could be used by other organizations to access your [place] medical records  [id number]        Your Vitals Were     Pulse Temperature Respirations BMI (Body Mass Index)          70 98.3  F (36.8  C) (Oral) 16 30.73 kg/m2         Blood Pressure from Last 3 Encounters:   17 143/70   17 120/56   17 148/74    " Weight from Last 3 Encounters:   11/20/17 76.2 kg (168 lb)   11/13/17 76.7 kg (169 lb 3.2 oz)   11/06/17 77.3 kg (170 lb 6.7 oz)              We Performed the Following     CBC with platelets differential        Primary Care Provider Office Phone # Fax #    César GISELE Chung [phone]       [facility] [address] [place] [address] [facility] [address]  [facility] [address]        Equal Access to Services     [facility] LORI : Hadii aad ku hadasho Soomaali, waaxda luqadaha, qaybta kaalmada adeegyada, waxay idiin hayaan adeeg kharaabdirahman lasultana . So [facility] [phone].    ATENCIÓN: Si habla español, tiene a contreras disposición servicios gratuitos de asistencia lingüística. [facility] [phone].    We comply with applicable federal civil rights laws and [place] laws. We do not discriminate on the basis of race, color, national origin, age, disability, sex, sexual orientation, or gender identity.            Thank you!     Thank you for choosing [facility] AND [facility]  for your care. Our goal is always to provide you with excellent care. Hearing back from our patients is one way we can continue to improve our services. Please take a few minutes to complete the written survey that you may receive in the mail after your visit with us. Thank you!             Your Updated Medication List - Protect others around you: Learn how to safely use, store and throw away your medicines at www.disposemymeds.org.          This list is accurate as of: 11/20/17  2:23 PM.  Always use your most recent med list.                   Brand Name Dispense Instructions for use Diagnosis    ACYCLOVIR PO      Take 400 mg by mouth 2 times daily        aspirin 81 MG chewable tablet      Take 81 mg by mouth daily        calcium carbonate-vitamin D 600-400 MG-UNIT Chew     180 tablet    Take 1 chew tab by mouth 2 times daily    Multiple myeloma not having achieved remission (H)       * dexamethasone 4 MG tablet    DECADRON    30 tablet    Take  10 tablets (40 mg) by mouth every 7 days Days 1, 8, and 15.    Multiple myeloma not having achieved remission (H)       * dexamethasone 4 MG tablet    DECADRON    30 tablet    Take 10 tablets (40 mg) by mouth every 7 days for 3 doses Days 1, 8, and 15.    Multiple myeloma not having achieved remission (H)       LENalidomide 10 MG Caps capsule CHEMO    REVLIMID    14 capsule    Take 1 capsule (10 mg) by mouth daily for 14 days Days 1 through 14. Then off for 7 days.    Multiple myeloma not having achieved remission (H)       lisinopril 10 MG tablet    PRINIVIL/ZESTRIL    90 tablet    Take 1 tablet (10 mg) by mouth daily    Benign essential hypertension       LORazepam 0.5 MG tablet    ATIVAN    10 tablet    Take 1 tablet (0.5 mg) by mouth every 8 hours as needed (Anxiety, Nausea/Vomiting or Sleep)    Multiple myeloma not having achieved remission (H)       nitroGLYcerin 0.4 MG sublingual tablet    NITROSTAT    25 tablet    For chest pain place 1 tablet under the tongue every 5 minutes for 3 doses. If symptoms persist 5 minutes after 1st dose call 911.    Atypical chest pain       * order for DME     1 Units    Dispense one 4 wheeled walker with hand brakes and seat    Multiple myeloma not having achieved remission (H)       * order for DME     1 Units    Wheelchair.    Need for assistance due to unsteady gait       prochlorperazine 10 MG tablet    COMPAZINE    30 tablet    Take 1 tablet (10 mg) by mouth every 6 hours as needed (Nausea/Vomiting)    Multiple myeloma not having achieved remission (H)       * Notice:  This list has 4 medication(s) that are the same as other medications prescribed for you. Read the directions carefully, and ask your doctor or other care provider to review them with you.       motorized w/c/Wheelchair

## 2024-09-03 ENCOUNTER — OFFICE VISIT (OUTPATIENT)
Dept: FAMILY MEDICINE | Facility: CLINIC | Age: 89
End: 2024-09-03
Payer: MEDICARE

## 2024-09-03 VITALS
WEIGHT: 139.4 LBS | HEART RATE: 71 BPM | TEMPERATURE: 98.1 F | BODY MASS INDEX: 26.32 KG/M2 | OXYGEN SATURATION: 95 % | HEIGHT: 61 IN | SYSTOLIC BLOOD PRESSURE: 136 MMHG | RESPIRATION RATE: 16 BRPM | DIASTOLIC BLOOD PRESSURE: 76 MMHG

## 2024-09-03 DIAGNOSIS — E05.20 TOXIC MULTINODUL GOITER: ICD-10-CM

## 2024-09-03 DIAGNOSIS — C90.00 MULTIPLE MYELOMA NOT HAVING ACHIEVED REMISSION (H): Primary | ICD-10-CM

## 2024-09-03 DIAGNOSIS — I10 BENIGN ESSENTIAL HYPERTENSION: ICD-10-CM

## 2024-09-03 DIAGNOSIS — Z23 NEED FOR PROPHYLACTIC VACCINATION AND INOCULATION AGAINST INFLUENZA: ICD-10-CM

## 2024-09-03 PROCEDURE — G0008 ADMIN INFLUENZA VIRUS VAC: HCPCS | Performed by: INTERNAL MEDICINE

## 2024-09-03 PROCEDURE — G2211 COMPLEX E/M VISIT ADD ON: HCPCS | Performed by: INTERNAL MEDICINE

## 2024-09-03 PROCEDURE — 90662 IIV NO PRSV INCREASED AG IM: CPT | Performed by: INTERNAL MEDICINE

## 2024-09-03 PROCEDURE — 99213 OFFICE O/P EST LOW 20 MIN: CPT | Performed by: INTERNAL MEDICINE

## 2024-09-03 ASSESSMENT — PAIN SCALES - GENERAL: PAINLEVEL: NO PAIN (0)

## 2024-09-03 NOTE — PROGRESS NOTES
"Chief complaint: Follow-up myeloma, toxic multinodular goiter, hypertension  She admits to feeling a little bit tired with her cancer treatments although overall, she is in good spirits and is looking forward to family celebrations and gatherings in the future.  Her latest lab test at the beginning of August were stable.  Her last TSH was in May.    /76 (BP Location: Left arm, Patient Position: Sitting, Cuff Size: Adult Regular)   Pulse 71   Temp 98.1  F (36.7  C) (Tympanic)   Resp 16   Ht 1.549 m (5' 1\")   Wt 63.2 kg (139 lb 6.4 oz)   LMP  (LMP Unknown)   SpO2 95%   BMI 26.34 kg/m    General: Overall, she appears well and unchanged from previous exams.  HEENT: She had a cerumen impaction in the left external canal that was removed by Dr. Chung using a curette and after cerumen removal, the tympanic membrane is normal.  Cardiovascular: The heart has a regular rate and rhythm.  Pulmonary: The lungs are clear to auscultation bilaterally.  Extremities: No appreciable new edema.  Neurological: Alert and oriented to person place and time, cranial nerves II to XII appear grossly intact.  She uses a walker.  Mental state: Overall appropriate mood and affect, well-groomed.    I added a TSH to her upcoming labs to be drawn at the oncology clinic on the 12th    Visit Diagnosis, Associated Orders, and Comments     ICD-10-CM    1. Multiple myeloma not having achieved remission (H)  C90.00 Continue follow-up with oncology as directed      2. Toxic multinodul goiter  E05.20 TSH with free T4 reflex     Added TSH to upcoming lab draw      3. Benign essential hypertension  I10 Second check blood pressure reading in clinic is okay, continue current drugs        Follow-up: She appreciates short-term follow-up to help with the stress of living with myeloma, plan for 2-month follow-up interval    Answers submitted by the patient for this visit:  Hypertension Visit (Submitted on 9/3/2024)  Chief Complaint: Chronic problems " general questions HPI Form  Do you check your blood pressure regularly outside of the clinic?: No  Are your blood pressures ever more than 140 on the top number (systolic) OR more than 90 on the bottom number (diastolic)? (For example, greater than 140/90): No  Are you following a low salt diet?: No  General Questionnaire (Submitted on 9/3/2024)  Chief Complaint: Chronic problems general questions HPI Form  How many servings of fruits and vegetables do you eat daily?: 0-1  On average, how many sweetened beverages do you drink each day (Examples: soda, juice, sweet tea, etc.  Do NOT count diet or artificially sweetened beverages)?: 1  How many minutes a day do you exercise enough to make your heart beat faster?: 9 or less  How many days a week do you exercise enough to make your heart beat faster?: 3 or less  How many days per week do you miss taking your medication?: 0

## 2024-09-06 NOTE — PROGRESS NOTES
Oncology/Hematology Visit Note  Jul 10, 2023    Reason for Visit: Follow up of multiple myeloma    Primary Oncologist: Dr. Parry  Oncologic History:  Mrs. Dunaway is a lady with multiple myeloma (IgG kappa). High risk, t(14;16).  1. On 05/01/2017, WBC of 3.4, hemoglobin of 10.2 and platelet of 154.  2. SPEP on 07/07/2017 revealed M-spike of 1.8.  3. Bone marrow biopsy on 07/12/2017 reveals kappa monotypic plasma cell population consistent with multiple myeloma.  Bone marrow is 70% cellular.  Plasma cell is 50-60%.     -There is gain of chromosome 1, 5, 9, 15, and 17.  There is translocation 14;16.   4.  On 07/18/2017:  -M-spike of 1.9.   -Immunofixation reveals monoclonal IgG kappa.    -IgG level of 2970.  IgA of 15 and IgM of 175.    -Kappa free light chain of 67.7.  Lambda free light chain of 1.42.  Ratio of kappa to lambda of 47.71.    -Beta 2 microglobulin of 3.4.   5. PET scan on 07/24/2017 reveals several focal areas of increased FDG uptake consistent with multiple myeloma.  There is probable epithelial cyst in tail of the pancreas.  No associated abnormal FDG activity.  Left thyroid cysts or nodules without any FDG activity.  There is a 0.3 cm left upper lobe lung nodule.   6.  Velcade, Revlimid and dexamethasone between on 08/14/2017 and 04/30/2018. Velcade stopped.   -Revlimid and dexamethasone continued.  -Revlimid held between 12/27/2021 and 03/08/2022.  -Daratumumab added on 11/14/2022.  7. CT chest, abdomen, and pelvis on 12/30/2021 does not reveal any malignancy.  -Changed to daratumumab, pomalidomide and dexamethasone on 06/12/2023.  7. CT chest, abdomen, and pelvis on 12/30/2021 does not reveal any malignancy.  -Brain MRI on 12/13/2022  reveals 1.5 cm left parafalcine meningioma.    Interval History:  Doing okay. With cycle 1 Pomalyst had significant fatigue, not wanting to get out of beed and not eating as much. This was persistent throughout and improved with week off. No rash or dyspnea, but  single episode of diarrhea. She has ongoing chronic vertigo type symptoms, unchanged. Baseline neck pain, improves with NSAID use.  Weight stable. No nausea GI side effects. No rash. Has cough/dyspnea chronically and follows with PCP.       Review of Systems:  A complete review of systems was negative except as noted in the HPI.     Past medical, Surgical, and social history:  Reviewed    Physical Examination:  /75   Pulse 86   Temp 98  F (36.7  C) (Oral)   Resp 16   Wt 59.9 kg (132 lb)   SpO2 90%   BMI 24.14 kg/m    Wt Readings from Last 10 Encounters:   07/10/23 59.9 kg (132 lb)   06/12/23 59 kg (130 lb)   06/07/23 59.9 kg (132 lb 1.6 oz)   05/15/23 59 kg (130 lb)   05/02/23 57.6 kg (127 lb)   05/01/23 57.8 kg (127 lb 6.4 oz)   04/28/23 57.6 kg (127 lb)   04/03/23 57.7 kg (127 lb 3.2 oz)   03/20/23 59.6 kg (131 lb 6.4 oz)   03/10/23 56.7 kg (125 lb)     General: Elderly patient in no acute distress.   Skin: Warm and dry. No abnormal ecchymosis or rashes.  Eyes: Anicteric.   ENT: Poor dentition. Edentulous to upper jaw, only about 6 lower teeth present and appear poor health no surrounding erythema or infectious signs to the soft tissue.   Lungs: Normal work of breathing.  Abdomen: Non-distended.  Extremities: No peripheral edema. Ambulates with some difficulty with walker.  Mental: Calm, cooperative, appropriate. Mood euthymic. Very positive and gracious.  Neuro: Mouth drooling likely due to dentition. Alert and oriented x 3.    Laboratory Data:  CBC, CMP reviewed.       Assessment and Plan:  Quiana Dunaway is a 87 year old year old female with high risk (t14;16) kappa multiple myeloma.     # Multiple Myleoma  - Continue monthly Faspro subcutaneous every 2 weeks, switches to monthly after current cycle  - Continue dex 4 mg PO weekly  - Continue Pomalyst   - Due to start cycle 2 today, delay x 1 week for neutropenia and plan to repeat labs in 1 week  - Continue acyclovir and ASA  - Protein studies  pending today, but kappa FLC overall downtrending  - Follow-up with Dr. Parry/ROSA MARIA each cycle     # Bone Health  - Would benefit from Zometa however unable to initiate due to likely ongoing/chronic dental issues and possible infections; patient not interested in completing extractions as it is too stressful on her body to recover    # Diarrhea, Chronic  - Continue Imodium PRN  - Consider fiber supplement due to diet limited to soft foods    # Insomnia  # Decreased Appetite  # Fatigue  - Continue Remeron    # Vertigo  # Dizzines  - Previously met with ENT, neurology consult pending (out to October)  - MRI brain negative 12/2022  - Recommend discussing with the PT that comes to her house today to consider Epley maneuver  - Continue meclizine PRN, okay to take up to TID   - She does use Ativan, Compazine, Remeron which may contribute to dizziness    25 minutes spent on the date of the encounter doing chart review, review of test results, interpretation of tests, patient visit, documentation and discussion with other provider(s)     Vivian Macedo PA-C  Department of Hematology and Oncology  [facility]    Bill For Surgical Tray: no Expected Date Of Service: 09/06/2024 Billing Type: Third-Party Bill Performing Laboratory: 0

## 2024-09-09 LAB
MDC_IDC_EPISODE_DTM: NORMAL
MDC_IDC_EPISODE_DURATION: 1137 S
MDC_IDC_EPISODE_DURATION: 1357 S
MDC_IDC_EPISODE_DURATION: 1429 S
MDC_IDC_EPISODE_DURATION: 158 S
MDC_IDC_EPISODE_DURATION: 181 S
MDC_IDC_EPISODE_DURATION: 182 S
MDC_IDC_EPISODE_DURATION: 184 S
MDC_IDC_EPISODE_DURATION: 186 S
MDC_IDC_EPISODE_DURATION: 196 S
MDC_IDC_EPISODE_DURATION: 198 S
MDC_IDC_EPISODE_DURATION: 200 S
MDC_IDC_EPISODE_DURATION: 215 S
MDC_IDC_EPISODE_DURATION: 222 S
MDC_IDC_EPISODE_DURATION: 225 S
MDC_IDC_EPISODE_DURATION: 233 S
MDC_IDC_EPISODE_DURATION: 235 S
MDC_IDC_EPISODE_DURATION: 2450 S
MDC_IDC_EPISODE_DURATION: 300 S
MDC_IDC_EPISODE_DURATION: 327 S
MDC_IDC_EPISODE_DURATION: 3721 S
MDC_IDC_EPISODE_DURATION: 390 S
MDC_IDC_EPISODE_DURATION: 412 S
MDC_IDC_EPISODE_DURATION: 42 S
MDC_IDC_EPISODE_DURATION: 452 S
MDC_IDC_EPISODE_DURATION: 458 S
MDC_IDC_EPISODE_DURATION: 528 S
MDC_IDC_EPISODE_DURATION: 561 S
MDC_IDC_EPISODE_DURATION: 658 S
MDC_IDC_EPISODE_DURATION: 682 S
MDC_IDC_EPISODE_DURATION: 69 S
MDC_IDC_EPISODE_DURATION: 697 S
MDC_IDC_EPISODE_DURATION: 86 S
MDC_IDC_EPISODE_DURATION: 875 S
MDC_IDC_EPISODE_DURATION: 892 S
MDC_IDC_EPISODE_DURATION: 98 S
MDC_IDC_EPISODE_ID: 196
MDC_IDC_EPISODE_ID: 197
MDC_IDC_EPISODE_ID: 198
MDC_IDC_EPISODE_ID: 199
MDC_IDC_EPISODE_ID: 200
MDC_IDC_EPISODE_ID: 201
MDC_IDC_EPISODE_ID: 202
MDC_IDC_EPISODE_ID: 203
MDC_IDC_EPISODE_ID: 204
MDC_IDC_EPISODE_ID: 205
MDC_IDC_EPISODE_ID: 206
MDC_IDC_EPISODE_ID: 207
MDC_IDC_EPISODE_ID: 208
MDC_IDC_EPISODE_ID: 209
MDC_IDC_EPISODE_ID: 210
MDC_IDC_EPISODE_ID: 211
MDC_IDC_EPISODE_ID: 212
MDC_IDC_EPISODE_ID: 213
MDC_IDC_EPISODE_ID: 214
MDC_IDC_EPISODE_ID: 215
MDC_IDC_EPISODE_ID: 216
MDC_IDC_EPISODE_ID: 217
MDC_IDC_EPISODE_ID: 218
MDC_IDC_EPISODE_ID: 219
MDC_IDC_EPISODE_ID: 220
MDC_IDC_EPISODE_ID: 221
MDC_IDC_EPISODE_ID: 222
MDC_IDC_EPISODE_ID: 223
MDC_IDC_EPISODE_ID: 224
MDC_IDC_EPISODE_ID: 225
MDC_IDC_EPISODE_ID: 226
MDC_IDC_EPISODE_ID: 227
MDC_IDC_EPISODE_ID: 228
MDC_IDC_EPISODE_ID: 229
MDC_IDC_EPISODE_ID: 230
MDC_IDC_EPISODE_TYPE: NORMAL
MDC_IDC_LEAD_CONNECTION_STATUS: NORMAL
MDC_IDC_LEAD_CONNECTION_STATUS: NORMAL
MDC_IDC_LEAD_IMPLANT_DT: NORMAL
MDC_IDC_LEAD_IMPLANT_DT: NORMAL
MDC_IDC_LEAD_LOCATION: NORMAL
MDC_IDC_LEAD_LOCATION: NORMAL
MDC_IDC_LEAD_LOCATION_DETAIL_1: NORMAL
MDC_IDC_LEAD_LOCATION_DETAIL_1: NORMAL
MDC_IDC_LEAD_MFG: NORMAL
MDC_IDC_LEAD_MFG: NORMAL
MDC_IDC_LEAD_MODEL: NORMAL
MDC_IDC_LEAD_MODEL: NORMAL
MDC_IDC_LEAD_POLARITY_TYPE: NORMAL
MDC_IDC_LEAD_POLARITY_TYPE: NORMAL
MDC_IDC_LEAD_SERIAL: NORMAL
MDC_IDC_LEAD_SERIAL: NORMAL
MDC_IDC_MSMT_BATTERY_DTM: NORMAL
MDC_IDC_MSMT_BATTERY_REMAINING_LONGEVITY: 8 MO
MDC_IDC_MSMT_BATTERY_RRT_TRIGGER: 2.83
MDC_IDC_MSMT_BATTERY_STATUS: NORMAL
MDC_IDC_MSMT_BATTERY_VOLTAGE: 2.88 V
MDC_IDC_MSMT_LEADCHNL_RA_IMPEDANCE_VALUE: 323 OHM
MDC_IDC_MSMT_LEADCHNL_RA_IMPEDANCE_VALUE: 342 OHM
MDC_IDC_MSMT_LEADCHNL_RA_PACING_THRESHOLD_AMPLITUDE: 0.38 V
MDC_IDC_MSMT_LEADCHNL_RA_PACING_THRESHOLD_PULSEWIDTH: 0.4 MS
MDC_IDC_MSMT_LEADCHNL_RA_SENSING_INTR_AMPL: 1.62 MV
MDC_IDC_MSMT_LEADCHNL_RA_SENSING_INTR_AMPL: 1.62 MV
MDC_IDC_MSMT_LEADCHNL_RV_IMPEDANCE_VALUE: 380 OHM
MDC_IDC_MSMT_LEADCHNL_RV_IMPEDANCE_VALUE: 494 OHM
MDC_IDC_MSMT_LEADCHNL_RV_PACING_THRESHOLD_AMPLITUDE: 2.5 V
MDC_IDC_MSMT_LEADCHNL_RV_PACING_THRESHOLD_PULSEWIDTH: 0.4 MS
MDC_IDC_MSMT_LEADCHNL_RV_SENSING_INTR_AMPL: 11.5 MV
MDC_IDC_MSMT_LEADCHNL_RV_SENSING_INTR_AMPL: 11.5 MV
MDC_IDC_PG_IMPLANT_DTM: NORMAL
MDC_IDC_PG_MFG: NORMAL
MDC_IDC_PG_MODEL: NORMAL
MDC_IDC_PG_SERIAL: NORMAL
MDC_IDC_PG_TYPE: NORMAL
MDC_IDC_SESS_CLINIC_NAME: NORMAL
MDC_IDC_SESS_DTM: NORMAL
MDC_IDC_SESS_TYPE: NORMAL
MDC_IDC_SET_BRADY_AT_MODE_SWITCH_RATE: 171 {BEATS}/MIN
MDC_IDC_SET_BRADY_HYSTRATE: NORMAL
MDC_IDC_SET_BRADY_LOWRATE: 60 {BEATS}/MIN
MDC_IDC_SET_BRADY_MAX_SENSOR_RATE: 120 {BEATS}/MIN
MDC_IDC_SET_BRADY_MAX_TRACKING_RATE: 120 {BEATS}/MIN
MDC_IDC_SET_BRADY_MODE: NORMAL
MDC_IDC_SET_BRADY_PAV_DELAY_LOW: 200 MS
MDC_IDC_SET_BRADY_SAV_DELAY_LOW: 180 MS
MDC_IDC_SET_LEADCHNL_RA_PACING_AMPLITUDE: 1.5 V
MDC_IDC_SET_LEADCHNL_RA_PACING_ANODE_ELECTRODE_1: NORMAL
MDC_IDC_SET_LEADCHNL_RA_PACING_ANODE_LOCATION_1: NORMAL
MDC_IDC_SET_LEADCHNL_RA_PACING_CAPTURE_MODE: NORMAL
MDC_IDC_SET_LEADCHNL_RA_PACING_CATHODE_ELECTRODE_1: NORMAL
MDC_IDC_SET_LEADCHNL_RA_PACING_CATHODE_LOCATION_1: NORMAL
MDC_IDC_SET_LEADCHNL_RA_PACING_POLARITY: NORMAL
MDC_IDC_SET_LEADCHNL_RA_PACING_PULSEWIDTH: 0.4 MS
MDC_IDC_SET_LEADCHNL_RA_SENSING_ANODE_ELECTRODE_1: NORMAL
MDC_IDC_SET_LEADCHNL_RA_SENSING_ANODE_LOCATION_1: NORMAL
MDC_IDC_SET_LEADCHNL_RA_SENSING_CATHODE_ELECTRODE_1: NORMAL
MDC_IDC_SET_LEADCHNL_RA_SENSING_CATHODE_LOCATION_1: NORMAL
MDC_IDC_SET_LEADCHNL_RA_SENSING_POLARITY: NORMAL
MDC_IDC_SET_LEADCHNL_RA_SENSING_SENSITIVITY: 0.3 MV
MDC_IDC_SET_LEADCHNL_RV_PACING_AMPLITUDE: 5 V
MDC_IDC_SET_LEADCHNL_RV_PACING_ANODE_ELECTRODE_1: NORMAL
MDC_IDC_SET_LEADCHNL_RV_PACING_ANODE_LOCATION_1: NORMAL
MDC_IDC_SET_LEADCHNL_RV_PACING_CAPTURE_MODE: NORMAL
MDC_IDC_SET_LEADCHNL_RV_PACING_CATHODE_ELECTRODE_1: NORMAL
MDC_IDC_SET_LEADCHNL_RV_PACING_CATHODE_LOCATION_1: NORMAL
MDC_IDC_SET_LEADCHNL_RV_PACING_POLARITY: NORMAL
MDC_IDC_SET_LEADCHNL_RV_PACING_PULSEWIDTH: 0.4 MS
MDC_IDC_SET_LEADCHNL_RV_SENSING_ANODE_ELECTRODE_1: NORMAL
MDC_IDC_SET_LEADCHNL_RV_SENSING_ANODE_LOCATION_1: NORMAL
MDC_IDC_SET_LEADCHNL_RV_SENSING_CATHODE_ELECTRODE_1: NORMAL
MDC_IDC_SET_LEADCHNL_RV_SENSING_CATHODE_LOCATION_1: NORMAL
MDC_IDC_SET_LEADCHNL_RV_SENSING_POLARITY: NORMAL
MDC_IDC_SET_LEADCHNL_RV_SENSING_SENSITIVITY: 0.9 MV
MDC_IDC_SET_ZONE_DETECTION_INTERVAL: 350 MS
MDC_IDC_SET_ZONE_DETECTION_INTERVAL: 400 MS
MDC_IDC_SET_ZONE_STATUS: NORMAL
MDC_IDC_SET_ZONE_STATUS: NORMAL
MDC_IDC_SET_ZONE_TYPE: NORMAL
MDC_IDC_SET_ZONE_VENDOR_TYPE: NORMAL
MDC_IDC_STAT_AT_BURDEN_PERCENT: 0.7 %
MDC_IDC_STAT_AT_DTM_END: NORMAL
MDC_IDC_STAT_AT_DTM_START: NORMAL
MDC_IDC_STAT_BRADY_AP_VP_PERCENT: 93.93 %
MDC_IDC_STAT_BRADY_AP_VS_PERCENT: 0.03 %
MDC_IDC_STAT_BRADY_AS_VP_PERCENT: 6.04 %
MDC_IDC_STAT_BRADY_AS_VS_PERCENT: 0.01 %
MDC_IDC_STAT_BRADY_DTM_END: NORMAL
MDC_IDC_STAT_BRADY_DTM_START: NORMAL
MDC_IDC_STAT_BRADY_RA_PERCENT_PACED: 93.57 %
MDC_IDC_STAT_BRADY_RV_PERCENT_PACED: 99.94 %
MDC_IDC_STAT_EPISODE_RECENT_COUNT: 0
MDC_IDC_STAT_EPISODE_RECENT_COUNT: 35
MDC_IDC_STAT_EPISODE_RECENT_COUNT_DTM_END: NORMAL
MDC_IDC_STAT_EPISODE_RECENT_COUNT_DTM_START: NORMAL
MDC_IDC_STAT_EPISODE_TOTAL_COUNT: 0
MDC_IDC_STAT_EPISODE_TOTAL_COUNT: 0
MDC_IDC_STAT_EPISODE_TOTAL_COUNT: 14
MDC_IDC_STAT_EPISODE_TOTAL_COUNT: 213
MDC_IDC_STAT_EPISODE_TOTAL_COUNT_DTM_END: NORMAL
MDC_IDC_STAT_EPISODE_TOTAL_COUNT_DTM_START: NORMAL
MDC_IDC_STAT_EPISODE_TYPE: NORMAL
MDC_IDC_STAT_TACHYTHERAPY_RECENT_DTM_END: NORMAL
MDC_IDC_STAT_TACHYTHERAPY_RECENT_DTM_START: NORMAL
MDC_IDC_STAT_TACHYTHERAPY_TOTAL_DTM_END: NORMAL
MDC_IDC_STAT_TACHYTHERAPY_TOTAL_DTM_START: NORMAL

## 2024-09-12 ENCOUNTER — ONCOLOGY VISIT (OUTPATIENT)
Dept: ONCOLOGY | Facility: CLINIC | Age: 89
End: 2024-09-12
Attending: INTERNAL MEDICINE
Payer: MEDICARE

## 2024-09-12 ENCOUNTER — INFUSION THERAPY VISIT (OUTPATIENT)
Dept: INFUSION THERAPY | Facility: CLINIC | Age: 89
End: 2024-09-12
Attending: NURSE PRACTITIONER
Payer: MEDICARE

## 2024-09-12 ENCOUNTER — PATIENT OUTREACH (OUTPATIENT)
Dept: CARE COORDINATION | Facility: CLINIC | Age: 89
End: 2024-09-12

## 2024-09-12 VITALS — BODY MASS INDEX: 25.68 KG/M2 | HEIGHT: 61 IN | WEIGHT: 136 LBS

## 2024-09-12 VITALS
OXYGEN SATURATION: 96 % | DIASTOLIC BLOOD PRESSURE: 87 MMHG | BODY MASS INDEX: 25.7 KG/M2 | SYSTOLIC BLOOD PRESSURE: 163 MMHG | RESPIRATION RATE: 18 BRPM | HEART RATE: 77 BPM | TEMPERATURE: 98.3 F | WEIGHT: 136 LBS

## 2024-09-12 DIAGNOSIS — E05.20 TOXIC MULTINODUL GOITER: ICD-10-CM

## 2024-09-12 DIAGNOSIS — C90.00 MULTIPLE MYELOMA NOT HAVING ACHIEVED REMISSION (H): Primary | ICD-10-CM

## 2024-09-12 LAB
ACANTHOCYTES BLD QL SMEAR: SLIGHT
ALBUMIN SERPL BCG-MCNC: 3.9 G/DL (ref 3.5–5.2)
ALP SERPL-CCNC: 93 U/L (ref 40–150)
ALT SERPL W P-5'-P-CCNC: 10 U/L (ref 0–50)
ANION GAP SERPL CALCULATED.3IONS-SCNC: 11 MMOL/L (ref 7–15)
AST SERPL W P-5'-P-CCNC: 17 U/L (ref 0–45)
BASOPHILS # BLD AUTO: 0.1 10E3/UL (ref 0–0.2)
BASOPHILS # BLD AUTO: 0.1 10E3/UL (ref 0–0.2)
BASOPHILS NFR BLD AUTO: 1 %
BASOPHILS NFR BLD AUTO: 1 %
BILIRUB SERPL-MCNC: 0.5 MG/DL
BUN SERPL-MCNC: 17.4 MG/DL (ref 8–23)
CALCIUM SERPL-MCNC: 8.9 MG/DL (ref 8.8–10.4)
CHLORIDE SERPL-SCNC: 109 MMOL/L (ref 98–107)
CREAT SERPL-MCNC: 0.61 MG/DL (ref 0.51–0.95)
EGFRCR SERPLBLD CKD-EPI 2021: 86 ML/MIN/1.73M2
ELLIPTOCYTES BLD QL SMEAR: ABNORMAL
EOSINOPHIL # BLD AUTO: 0.2 10E3/UL (ref 0–0.7)
EOSINOPHIL # BLD AUTO: 0.2 10E3/UL (ref 0–0.7)
EOSINOPHIL NFR BLD AUTO: 3 %
EOSINOPHIL NFR BLD AUTO: 3 %
ERYTHROCYTE [DISTWIDTH] IN BLOOD BY AUTOMATED COUNT: 14.6 % (ref 10–15)
ERYTHROCYTE [DISTWIDTH] IN BLOOD BY AUTOMATED COUNT: 14.7 % (ref 10–15)
GLUCOSE SERPL-MCNC: 100 MG/DL (ref 70–99)
HCO3 SERPL-SCNC: 23 MMOL/L (ref 22–29)
HCT VFR BLD AUTO: 33.7 % (ref 35–47)
HCT VFR BLD AUTO: 33.7 % (ref 35–47)
HGB BLD-MCNC: 11.2 G/DL (ref 11.7–15.7)
HGB BLD-MCNC: 11.2 G/DL (ref 11.7–15.7)
IMM GRANULOCYTES # BLD: 0 10E3/UL
IMM GRANULOCYTES # BLD: 0 10E3/UL
IMM GRANULOCYTES NFR BLD: 0 %
IMM GRANULOCYTES NFR BLD: 0 %
LYMPHOCYTES # BLD AUTO: 1.6 10E3/UL (ref 0.8–5.3)
LYMPHOCYTES # BLD AUTO: 1.7 10E3/UL (ref 0.8–5.3)
LYMPHOCYTES NFR BLD AUTO: 34 %
LYMPHOCYTES NFR BLD AUTO: 34 %
MCH RBC QN AUTO: 29.9 PG (ref 26.5–33)
MCH RBC QN AUTO: 29.9 PG (ref 26.5–33)
MCHC RBC AUTO-ENTMCNC: 33.2 G/DL (ref 31.5–36.5)
MCHC RBC AUTO-ENTMCNC: 33.2 G/DL (ref 31.5–36.5)
MCV RBC AUTO: 90 FL (ref 78–100)
MCV RBC AUTO: 90 FL (ref 78–100)
MONOCYTES # BLD AUTO: 0.5 10E3/UL (ref 0–1.3)
MONOCYTES # BLD AUTO: 0.6 10E3/UL (ref 0–1.3)
MONOCYTES NFR BLD AUTO: 10 %
MONOCYTES NFR BLD AUTO: 11 %
NEUTROPHILS # BLD AUTO: 2.4 10E3/UL (ref 1.6–8.3)
NEUTROPHILS # BLD AUTO: 2.5 10E3/UL (ref 1.6–8.3)
NEUTROPHILS NFR BLD AUTO: 50 %
NEUTROPHILS NFR BLD AUTO: 51 %
NRBC # BLD AUTO: 0 10E3/UL
NRBC # BLD AUTO: 0 10E3/UL
NRBC BLD AUTO-RTO: 0 /100
NRBC BLD AUTO-RTO: 0 /100
PLAT MORPH BLD: ABNORMAL
PLATELET # BLD AUTO: 117 10E3/UL (ref 150–450)
PLATELET # BLD AUTO: 125 10E3/UL (ref 150–450)
POTASSIUM SERPL-SCNC: 4.2 MMOL/L (ref 3.4–5.3)
PROT SERPL-MCNC: 6.5 G/DL (ref 6.4–8.3)
RBC # BLD AUTO: 3.74 10E6/UL (ref 3.8–5.2)
RBC # BLD AUTO: 3.75 10E6/UL (ref 3.8–5.2)
RBC MORPH BLD: ABNORMAL
SODIUM SERPL-SCNC: 143 MMOL/L (ref 135–145)
TOTAL PROTEIN SERUM FOR ELP: 6.2 G/DL (ref 6.4–8.3)
TSH SERPL DL<=0.005 MIU/L-ACNC: 2.47 UIU/ML (ref 0.3–4.2)
WBC # BLD AUTO: 4.8 10E3/UL (ref 4–11)
WBC # BLD AUTO: 5 10E3/UL (ref 4–11)

## 2024-09-12 PROCEDURE — 84155 ASSAY OF PROTEIN SERUM: CPT | Performed by: INTERNAL MEDICINE

## 2024-09-12 PROCEDURE — G0463 HOSPITAL OUTPT CLINIC VISIT: HCPCS | Mod: 25 | Performed by: INTERNAL MEDICINE

## 2024-09-12 PROCEDURE — 82784 ASSAY IGA/IGD/IGG/IGM EACH: CPT | Performed by: INTERNAL MEDICINE

## 2024-09-12 PROCEDURE — 36415 COLL VENOUS BLD VENIPUNCTURE: CPT | Performed by: INTERNAL MEDICINE

## 2024-09-12 PROCEDURE — 83521 IG LIGHT CHAINS FREE EACH: CPT | Mod: 59 | Performed by: INTERNAL MEDICINE

## 2024-09-12 PROCEDURE — 84165 PROTEIN E-PHORESIS SERUM: CPT | Mod: 26 | Performed by: PATHOLOGY

## 2024-09-12 PROCEDURE — 84165 PROTEIN E-PHORESIS SERUM: CPT | Mod: TC | Performed by: PATHOLOGY

## 2024-09-12 PROCEDURE — 36415 COLL VENOUS BLD VENIPUNCTURE: CPT

## 2024-09-12 PROCEDURE — 85004 AUTOMATED DIFF WBC COUNT: CPT | Performed by: INTERNAL MEDICINE

## 2024-09-12 PROCEDURE — 84443 ASSAY THYROID STIM HORMONE: CPT | Performed by: INTERNAL MEDICINE

## 2024-09-12 PROCEDURE — 99215 OFFICE O/P EST HI 40 MIN: CPT | Performed by: INTERNAL MEDICINE

## 2024-09-12 PROCEDURE — 96401 CHEMO ANTI-NEOPL SQ/IM: CPT

## 2024-09-12 PROCEDURE — 250N000011 HC RX IP 250 OP 636: Performed by: INTERNAL MEDICINE

## 2024-09-12 PROCEDURE — 84155 ASSAY OF PROTEIN SERUM: CPT | Mod: 91 | Performed by: INTERNAL MEDICINE

## 2024-09-12 PROCEDURE — 85048 AUTOMATED LEUKOCYTE COUNT: CPT | Performed by: INTERNAL MEDICINE

## 2024-09-12 RX ORDER — MEPERIDINE HYDROCHLORIDE 25 MG/ML
25 INJECTION INTRAMUSCULAR; INTRAVENOUS; SUBCUTANEOUS EVERY 30 MIN PRN
OUTPATIENT
Start: 2024-10-10

## 2024-09-12 RX ORDER — ALBUTEROL SULFATE 0.83 MG/ML
2.5 SOLUTION RESPIRATORY (INHALATION)
OUTPATIENT
Start: 2024-10-10

## 2024-09-12 RX ORDER — LORAZEPAM 2 MG/ML
0.5 INJECTION INTRAMUSCULAR EVERY 4 HOURS PRN
OUTPATIENT
Start: 2024-10-10

## 2024-09-12 RX ORDER — DEXAMETHASONE 4 MG/1
12 TABLET ORAL
OUTPATIENT
Start: 2024-10-10

## 2024-09-12 RX ORDER — EPINEPHRINE 1 MG/ML
0.3 INJECTION, SOLUTION INTRAMUSCULAR; SUBCUTANEOUS EVERY 5 MIN PRN
OUTPATIENT
Start: 2024-10-10

## 2024-09-12 RX ORDER — HEPARIN SODIUM,PORCINE 10 UNIT/ML
5 VIAL (ML) INTRAVENOUS
OUTPATIENT
Start: 2024-10-10

## 2024-09-12 RX ORDER — DIPHENHYDRAMINE HYDROCHLORIDE 50 MG/ML
50 INJECTION INTRAMUSCULAR; INTRAVENOUS
Start: 2024-10-10

## 2024-09-12 RX ORDER — METHYLPREDNISOLONE SODIUM SUCCINATE 125 MG/2ML
125 INJECTION, POWDER, LYOPHILIZED, FOR SOLUTION INTRAMUSCULAR; INTRAVENOUS
Start: 2024-10-10

## 2024-09-12 RX ORDER — DIPHENHYDRAMINE HCL 25 MG
50 CAPSULE ORAL
OUTPATIENT
Start: 2024-10-10

## 2024-09-12 RX ORDER — ALBUTEROL SULFATE 90 UG/1
1-2 AEROSOL, METERED RESPIRATORY (INHALATION)
Start: 2024-10-10

## 2024-09-12 RX ORDER — ACETAMINOPHEN 325 MG/1
650 TABLET ORAL
OUTPATIENT
Start: 2024-10-10

## 2024-09-12 RX ORDER — HEPARIN SODIUM (PORCINE) LOCK FLUSH IV SOLN 100 UNIT/ML 100 UNIT/ML
5 SOLUTION INTRAVENOUS
OUTPATIENT
Start: 2024-10-10

## 2024-09-12 RX ADMIN — DARATUMUMAB AND HYALURONIDASE-FIHJ (HUMAN RECOMBINANT) 1800 MG: 1800; 30000 INJECTION SUBCUTANEOUS at 11:29

## 2024-09-12 ASSESSMENT — PAIN SCALES - GENERAL: PAINLEVEL: NO PAIN (0)

## 2024-09-12 NOTE — PROGRESS NOTES
"Oncology Rooming Note    September 12, 2024 10:33 AM   Quiana Dunaway is a 89 year old female who presents for:    Chief Complaint   Patient presents with    Oncology Clinic Visit     Initial Vitals: BP (!) 163/87   Pulse 77   Temp 98.3  F (36.8  C) (Oral)   Resp 18   Wt 61.7 kg (136 lb)   LMP  (LMP Unknown)   SpO2 96%   BMI 25.70 kg/m   Estimated body mass index is 25.7 kg/m  as calculated from the following:    Height as of 9/3/24: 1.549 m (5' 1\").    Weight as of this encounter: 61.7 kg (136 lb). Body surface area is 1.63 meters squared.  No Pain (0) Comment: Data Unavailable   No LMP recorded (lmp unknown). Patient is postmenopausal.  Allergies reviewed: Yes  Medications reviewed: Yes    Medications: Medication refills not needed today.  Pharmacy name entered into [employer]:    [facility] - [place], [place] - [address]  [id number] - [place], [place] - [address] AT [facility]    Frailty Screening:   Is the patient here for a new oncology consult visit in cancer care? 2. No      Clinical concerns: no       Shari J. Schoenberger, CMA            "

## 2024-09-12 NOTE — LETTER
"9/12/2024      Quiana Dunaway  [address] [facility] [address]      Dear Colleague,    Thank you for referring your patient, Quiana Dunaway, to the [facility]. Please see a copy of my visit note below.    Oncology Rooming Note    September 12, 2024 10:33 AM   Quiana Dunaway is a 89 year old female who presents for:    Chief Complaint   Patient presents with     Oncology Clinic Visit     Initial Vitals: BP (!) 163/87   Pulse 77   Temp 98.3  F (36.8  C) (Oral)   Resp 18   Wt 61.7 kg (136 lb)   LMP  (LMP Unknown)   SpO2 96%   BMI 25.70 kg/m   Estimated body mass index is 25.7 kg/m  as calculated from the following:    Height as of 9/3/24: 1.549 m (5' 1\").    Weight as of this encounter: 61.7 kg (136 lb). Body surface area is 1.63 meters squared.  No Pain (0) Comment: Data Unavailable   No LMP recorded (lmp unknown). Patient is postmenopausal.  Allergies reviewed: Yes  Medications reviewed: Yes    Medications: Medication refills not needed today.  Pharmacy name entered into [facility]:    [facility] - [place], [place] - [address]  [id number] - [place], [place] - [address] [facility] [address] AT [facility]    Frailty Screening:   Is the patient here for a new oncology consult visit in cancer care? 2. No      Clinical concerns: no       Shari J. Schoenberger, [facility]              HEMATOLOGY HISTORY: Mrs. Dunaway is a lady with multiple myeloma (IgG kappa). High risk, t(14;16).  1. On 05/01/2017, WBC of 3.4, hemoglobin of 10.2 and platelet of 154.  2. SPEP on 07/07/2017 revealed M-spike of 1.8.  3. Bone marrow biopsy on 07/12/2017 reveals kappa monotypic plasma cell population consistent with multiple myeloma.  Bone marrow is 70% cellular.  Plasma cell is 50-60%.     -There is gain of chromosome 1, 5, 9, 15, and 17.  There is translocation 14;16.   4.  On 07/18/2017:  -M-spike of 1.9.   -Immunofixation reveals monoclonal IgG " kappa.    -IgG level of 2970.  IgA of 15 and IgM of 175.    -Kappa free light chain of 67.7.  Lambda free light chain of 1.42.  Ratio of kappa to lambda of 47.71.    -Beta 2 microglobulin of 3.4.   5. PET scan on 07/24/2017 reveals several focal areas of increased FDG uptake consistent with multiple myeloma.  There is probable epithelial cyst in tail of the pancreas.  No associated abnormal FDG activity.  Left thyroid cysts or nodules without any FDG activity.  There is a 0.3 cm left upper lobe lung nodule.   6.  Velcade, Revlimid and dexamethasone between on 08/14/2017 and 04/30/2018. Velcade stopped.   -Revlimid and dexamethasone continued.  -Revlimid held between 12/27/2021 and 03/08/2022.  -Daratumumab added on 11/14/2022.  -Changed to daratumumab, pomalidomide and dexamethasone on 06/12/2023.  7. CT chest, abdomen, and pelvis on 12/30/2021 does not reveal any malignancy.  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     SUBJECTIVE:    Ms. Dunaway is an 89-year-old female with multiple myeloma on daratumumab, pomalidomide and dexamethasone.  She takesn pomalidomide 2 mg a day for 2 weeks on and 1 week off.  Dose reduction due to side effects.  She takes dexamethasone 4 mg weekly.  She is tolerating treatment well.       Patient has dental problem.  She has been advised dental extraction.  Because of that we are holding Zometa.     Her overall condition is stable.  She has mild generalized weakness secondary to myeloma, old age and medications.  No worsening of weakness.  She can do activities of daily living. She still works. She has arthritic joint pain. No worsening of pain.  She uses walker.     She has chronic dizziness.  No worsening of dizziness.  No recent falls.  No headache.  No chest pain.  No shortness of breath.  No abdominal pain. No nausea. No vomiting.  No urinary complaint.  She gets intermittent diarrhea.  No bleeding.  No fever or night sweats.  All other review of  system is negative.      PHYSICAL EXAMINATION:   GENERAL:  Alert and oriented x 3.  Not in distress.  VITAL SIGNS:  Reviewed.       Rest of the system is not examined.     LABORATORY: CBC and CMP reviewed.      ASSESSMENT:    1.  An 89-year-old female with multiple myeloma on daratumumab, pomalidomide and dexamethasone.  Myeloma is overall stable.  2.  Mild normocytic anemia from myeloma and its treatment. Anemia is stable.  3.  Thrombocytopenia. Stable.  4.  Chronic dizziness.  Stable.  5.  Generalized weakness from myeloma, anemia, medication and old age.  Stable.  6.  Arthritic joint pain.  7.  Dental problem.     PLAN:  Continue pomalidomid, daratumumab and dexamethasone. (Pomalidomide 2 mg a day for 2 weeks on and 1 week off.  Dexamethasone 4 mg weekly)  Continue to hold zometa.  See NP in 4 weeks.  See me in 8 weeks.  Continue prophylactic aspirin and acyclovir.     DISCUSSION:  1.  Patient's overall condition is stable.  She has mild generalized weakness.  No worsening.  She is able to do all her activities.  She still works part-time.    Myeloma labs done last month were reviewed.  Monoclonal peak is better at 0.7.  IgG level is normal.  [place] free light chain went up to 55 from 45.  Explained to the patient that overall I will consider myeloma to be stable.    2.  Discussed regarding treatment.  She will continue on daratumumab, pomalidomide and dexamethasone.  She is on reduced dose of pomalidomide due to side effects.  No need to increase pomalidomide dose at this time.    Explained to the patient that there are other treatment options when there is progression.  We can give her carfilzomib based therapy.  Other option is also CAR-T therapy.    3.  Patient has dental problem.  She has been recommended complete dental extraction.  She has been thinking about it.  Because of dental problem, will continue to hold Zometa.    4.  She will continue on prophylactic aspirin and acyclovir.    5.  She had a few  questions which were all answered.  I will see her in 2 months.  In between she will see our ROSA MARIA.  Advised her to call us with any questions or concerns.     TOTAL VISIT TIME: 40 minutes.  Time spent in today's visit, review of chart/investigations today, monitoring for toxicity of high risk medications and documentation today.              Again, thank you for allowing me to participate in the care of your patient.        Sincerely,        Alex Parry MD

## 2024-09-12 NOTE — PROGRESS NOTES
HEMATOLOGY HISTORY: Mrs. Dunaway is a lady with multiple myeloma (IgG kappa). High risk, t(14;16).  1. On 05/01/2017, WBC of 3.4, hemoglobin of 10.2 and platelet of 154.  2. SPEP on 07/07/2017 revealed M-spike of 1.8.  3. Bone marrow biopsy on 07/12/2017 reveals kappa monotypic plasma cell population consistent with multiple myeloma.  Bone marrow is 70% cellular.  Plasma cell is 50-60%.     -There is gain of chromosome 1, 5, 9, 15, and 17.  There is translocation 14;16.   4.  On 07/18/2017:  -M-spike of 1.9.   -Immunofixation reveals monoclonal IgG kappa.    -IgG level of 2970.  IgA of 15 and IgM of 175.    -Kappa free light chain of 67.7.  Lambda free light chain of 1.42.  Ratio of kappa to lambda of 47.71.    -Beta 2 microglobulin of 3.4.   5. PET scan on 07/24/2017 reveals several focal areas of increased FDG uptake consistent with multiple myeloma.  There is probable epithelial cyst in tail of the pancreas.  No associated abnormal FDG activity.  Left thyroid cysts or nodules without any FDG activity.  There is a 0.3 cm left upper lobe lung nodule.   6.  Velcade, Revlimid and dexamethasone between on 08/14/2017 and 04/30/2018. Velcade stopped.   -Revlimid and dexamethasone continued.  -Revlimid held between 12/27/2021 and 03/08/2022.  -Daratumumab added on 11/14/2022.  -Changed to daratumumab, pomalidomide and dexamethasone on 06/12/2023.  7. CT chest, abdomen, and pelvis on 12/30/2021 does not reveal any malignancy.  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     SUBJECTIVE:    Ms. Dunaway is an 89-year-old female with multiple myeloma on daratumumab, pomalidomide and dexamethasone.  She takesn pomalidomide 2 mg a day for 2 weeks on and 1 week off.  Dose reduction due to side effects.  She takes dexamethasone 4 mg weekly.  She is tolerating treatment well.       Patient has dental problem.  She has been advised dental extraction.  Because of that we are holding Zometa.     Her  overall condition is stable.  She has mild generalized weakness secondary to myeloma, old age and medications.  No worsening of weakness.  She can do activities of daily living. She still works. She has arthritic joint pain. No worsening of pain.  She uses walker.     She has chronic dizziness.  No worsening of dizziness.  No recent falls.  No headache.  No chest pain.  No shortness of breath.  No abdominal pain. No nausea. No vomiting.  No urinary complaint.  She gets intermittent diarrhea.  No bleeding.  No fever or night sweats.  All other review of system is negative.      PHYSICAL EXAMINATION:   GENERAL:  Alert and oriented x 3.  Not in distress.  VITAL SIGNS:  Reviewed.       Rest of the system is not examined.     LABORATORY: CBC and CMP reviewed.      ASSESSMENT:    1.  An 89-year-old female with multiple myeloma on daratumumab, pomalidomide and dexamethasone.  Myeloma is overall stable.  2.  Mild normocytic anemia from myeloma and its treatment. Anemia is stable.  3.  Thrombocytopenia. Stable.  4.  Chronic dizziness.  Stable.  5.  Generalized weakness from myeloma, anemia, medication and old age.  Stable.  6.  Arthritic joint pain.  7.  Dental problem.     PLAN:  Continue pomalidomid, daratumumab and dexamethasone. (Pomalidomide 2 mg a day for 2 weeks on and 1 week off.  Dexamethasone 4 mg weekly)  Continue to hold zometa.  See NP in 4 weeks.  See me in 8 weeks.  Continue prophylactic aspirin and acyclovir.     DISCUSSION:  1.  Patient's overall condition is stable.  She has mild generalized weakness.  No worsening.  She is able to do all her activities.  She still works part-time.    Myeloma labs done last month were reviewed.  Monoclonal peak is better at 0.7.  IgG level is normal.  [place] free light chain went up to 55 from 45.  Explained to the patient that overall I will consider myeloma to be stable.    2.  Discussed regarding treatment.  She will continue on daratumumab, pomalidomide and  dexamethasone.  She is on reduced dose of pomalidomide due to side effects.  No need to increase pomalidomide dose at this time.    Explained to the patient that there are other treatment options when there is progression.  We can give her carfilzomib based therapy.  Other option is also CAR-T therapy.    3.  Patient has dental problem.  She has been recommended complete dental extraction.  She has been thinking about it.  Because of dental problem, will continue to hold Zometa.    4.  She will continue on prophylactic aspirin and acyclovir.    5.  She had a few questions which were all answered.  I will see her in 2 months.  In between she will see our ROSA MARIA.  Advised her to call us with any questions or concerns.     TOTAL VISIT TIME: 40 minutes.  Time spent in today's visit, review of chart/investigations today, monitoring for toxicity of high risk medications and documentation today.

## 2024-09-12 NOTE — PROGRESS NOTES
Infusion Nursing Note:  Quiana PEREZ Emelyn presents today for C23D1 Darzalex.    Patient seen by provider today: Yes: Sohan   present during visit today: Not Applicable.    Note: N/A.      Intravenous Access:  No Intravenous access/labs at this visit.    Treatment Conditions:  Lab Results   Component Value Date    HGB 11.2 (L) 09/12/2024    WBC 4.8 09/12/2024    ANEU 2.1 06/03/2024    ANEUTAUTO 2.4 09/12/2024     (L) 09/12/2024        Lab Results   Component Value Date     09/12/2024    POTASSIUM 4.2 09/12/2024    MAG 2.4 (H) 05/26/2020    CR 0.61 09/12/2024    HUMBERTO 8.9 09/12/2024    BILITOTAL 0.5 09/12/2024    ALBUMIN 3.9 09/12/2024    ALT 10 09/12/2024    AST 17 09/12/2024       Results reviewed, labs MET treatment parameters, ok to proceed with treatment.      Post Infusion Assessment:  Patient tolerated injection without incident.       Discharge Plan:   Discharge instructions reviewed with: Patient and Family.  Patient and/or family verbalized understanding of discharge instructions and all questions answered.  AVS to patient via [facility].  Patient will return 10/9/24 for next appointment.   Patient discharged in stable condition accompanied by: daughter-in-law.  Departure Mode: Ambulatory.      Wes New RN

## 2024-09-13 LAB
ALBUMIN SERPL ELPH-MCNC: 3.9 G/DL (ref 3.7–5.1)
ALPHA1 GLOB SERPL ELPH-MCNC: 0.3 G/DL (ref 0.2–0.4)
ALPHA2 GLOB SERPL ELPH-MCNC: 0.6 G/DL (ref 0.5–0.9)
B-GLOBULIN SERPL ELPH-MCNC: 0.6 G/DL (ref 0.6–1)
GAMMA GLOB SERPL ELPH-MCNC: 0.9 G/DL (ref 0.7–1.6)
IGG SERPL-MCNC: 947 MG/DL (ref 610–1616)
KAPPA LC FREE SER-MCNC: 50.33 MG/DL (ref 0.33–1.94)
KAPPA LC FREE/LAMBDA FREE SER NEPH: 85.31 {RATIO} (ref 0.26–1.65)
LAMBDA LC FREE SERPL-MCNC: 0.59 MG/DL (ref 0.57–2.63)
LOCATION OF TASK: ABNORMAL
M PROTEIN SERPL ELPH-MCNC: 0.7 G/DL
PROT PATTERN SERPL ELPH-IMP: ABNORMAL

## 2024-09-13 NOTE — RESULT ENCOUNTER NOTE
Dear Ms. Dunaway,    [place] free light chain is stable.    Please, call me with any questions.    Alex Parry MD

## 2024-09-16 DIAGNOSIS — C90.00 MULTIPLE MYELOMA NOT HAVING ACHIEVED REMISSION (H): Primary | ICD-10-CM

## 2024-09-18 ENCOUNTER — TELEPHONE (OUTPATIENT)
Dept: PHARMACY | Facility: CLINIC | Age: 89
End: 2024-09-18
Payer: MEDICARE

## 2024-09-18 NOTE — TELEPHONE ENCOUNTER
Oral Chemotherapy Monitoring Program   Medication: Pomalyst  Rx: 2mg PO daily on days 1 through 14 of 21 day cycle   Auth #:  [id number]  Risk Category: adult female of non child bearing potential  Routine survey questions reviewed.   Rx to be Escribed to [facility]     Terrie Bowen [facility] Oncology Pharmacy Liaison  [phone]

## 2024-09-26 ENCOUNTER — TRANSFERRED RECORDS (OUTPATIENT)
Dept: HEALTH INFORMATION MANAGEMENT | Facility: CLINIC | Age: 89
End: 2024-09-26
Payer: MEDICARE

## 2024-09-27 ENCOUNTER — ANCILLARY PROCEDURE (OUTPATIENT)
Dept: CARDIOLOGY | Facility: CLINIC | Age: 89
End: 2024-09-27
Attending: INTERNAL MEDICINE
Payer: MEDICARE

## 2024-09-27 DIAGNOSIS — Z95.0 CARDIAC PACEMAKER IN SITU: ICD-10-CM

## 2024-09-27 DIAGNOSIS — I49.5 SICK SINUS SYNDROME (H): ICD-10-CM

## 2024-09-27 LAB
MDC_IDC_EPISODE_DTM: NORMAL
MDC_IDC_EPISODE_DURATION: 1050 S
MDC_IDC_EPISODE_DURATION: 133 S
MDC_IDC_EPISODE_DURATION: 197 S
MDC_IDC_EPISODE_DURATION: 258 S
MDC_IDC_EPISODE_DURATION: 425 S
MDC_IDC_EPISODE_DURATION: 506 S
MDC_IDC_EPISODE_DURATION: 61 S
MDC_IDC_EPISODE_DURATION: 671 S
MDC_IDC_EPISODE_DURATION: 702 S
MDC_IDC_EPISODE_ID: 231
MDC_IDC_EPISODE_ID: 232
MDC_IDC_EPISODE_ID: 233
MDC_IDC_EPISODE_ID: 234
MDC_IDC_EPISODE_ID: 235
MDC_IDC_EPISODE_ID: 236
MDC_IDC_EPISODE_ID: 237
MDC_IDC_EPISODE_ID: 238
MDC_IDC_EPISODE_ID: 239
MDC_IDC_EPISODE_TYPE: NORMAL
MDC_IDC_LEAD_CONNECTION_STATUS: NORMAL
MDC_IDC_LEAD_CONNECTION_STATUS: NORMAL
MDC_IDC_LEAD_IMPLANT_DT: NORMAL
MDC_IDC_LEAD_IMPLANT_DT: NORMAL
MDC_IDC_LEAD_LOCATION: NORMAL
MDC_IDC_LEAD_LOCATION: NORMAL
MDC_IDC_LEAD_LOCATION_DETAIL_1: NORMAL
MDC_IDC_LEAD_LOCATION_DETAIL_1: NORMAL
MDC_IDC_LEAD_MFG: NORMAL
MDC_IDC_LEAD_MFG: NORMAL
MDC_IDC_LEAD_MODEL: NORMAL
MDC_IDC_LEAD_MODEL: NORMAL
MDC_IDC_LEAD_POLARITY_TYPE: NORMAL
MDC_IDC_LEAD_POLARITY_TYPE: NORMAL
MDC_IDC_LEAD_SERIAL: NORMAL
MDC_IDC_LEAD_SERIAL: NORMAL
MDC_IDC_MSMT_BATTERY_DTM: NORMAL
MDC_IDC_MSMT_BATTERY_REMAINING_LONGEVITY: 6 MO
MDC_IDC_MSMT_BATTERY_RRT_TRIGGER: 2.83
MDC_IDC_MSMT_BATTERY_STATUS: NORMAL
MDC_IDC_MSMT_BATTERY_VOLTAGE: 2.88 V
MDC_IDC_MSMT_LEADCHNL_RA_IMPEDANCE_VALUE: 342 OHM
MDC_IDC_MSMT_LEADCHNL_RA_IMPEDANCE_VALUE: 342 OHM
MDC_IDC_MSMT_LEADCHNL_RA_PACING_THRESHOLD_AMPLITUDE: 0.5 V
MDC_IDC_MSMT_LEADCHNL_RA_PACING_THRESHOLD_PULSEWIDTH: 0.4 MS
MDC_IDC_MSMT_LEADCHNL_RA_SENSING_INTR_AMPL: 2.25 MV
MDC_IDC_MSMT_LEADCHNL_RA_SENSING_INTR_AMPL: 2.25 MV
MDC_IDC_MSMT_LEADCHNL_RV_IMPEDANCE_VALUE: 380 OHM
MDC_IDC_MSMT_LEADCHNL_RV_IMPEDANCE_VALUE: 494 OHM
MDC_IDC_MSMT_LEADCHNL_RV_PACING_THRESHOLD_AMPLITUDE: 2.12 V
MDC_IDC_MSMT_LEADCHNL_RV_PACING_THRESHOLD_PULSEWIDTH: 0.4 MS
MDC_IDC_MSMT_LEADCHNL_RV_SENSING_INTR_AMPL: 8.88 MV
MDC_IDC_MSMT_LEADCHNL_RV_SENSING_INTR_AMPL: 8.88 MV
MDC_IDC_PG_IMPLANT_DTM: NORMAL
MDC_IDC_PG_MFG: NORMAL
MDC_IDC_PG_MODEL: NORMAL
MDC_IDC_PG_SERIAL: NORMAL
MDC_IDC_PG_TYPE: NORMAL
MDC_IDC_SESS_CLINIC_NAME: NORMAL
MDC_IDC_SESS_DTM: NORMAL
MDC_IDC_SESS_TYPE: NORMAL
MDC_IDC_SET_BRADY_AT_MODE_SWITCH_RATE: 171 {BEATS}/MIN
MDC_IDC_SET_BRADY_HYSTRATE: NORMAL
MDC_IDC_SET_BRADY_LOWRATE: 60 {BEATS}/MIN
MDC_IDC_SET_BRADY_MAX_SENSOR_RATE: 120 {BEATS}/MIN
MDC_IDC_SET_BRADY_MAX_TRACKING_RATE: 120 {BEATS}/MIN
MDC_IDC_SET_BRADY_MODE: NORMAL
MDC_IDC_SET_BRADY_PAV_DELAY_LOW: 200 MS
MDC_IDC_SET_BRADY_SAV_DELAY_LOW: 180 MS
MDC_IDC_SET_LEADCHNL_RA_PACING_AMPLITUDE: 1.5 V
MDC_IDC_SET_LEADCHNL_RA_PACING_ANODE_ELECTRODE_1: NORMAL
MDC_IDC_SET_LEADCHNL_RA_PACING_ANODE_LOCATION_1: NORMAL
MDC_IDC_SET_LEADCHNL_RA_PACING_CAPTURE_MODE: NORMAL
MDC_IDC_SET_LEADCHNL_RA_PACING_CATHODE_ELECTRODE_1: NORMAL
MDC_IDC_SET_LEADCHNL_RA_PACING_CATHODE_LOCATION_1: NORMAL
MDC_IDC_SET_LEADCHNL_RA_PACING_POLARITY: NORMAL
MDC_IDC_SET_LEADCHNL_RA_PACING_PULSEWIDTH: 0.4 MS
MDC_IDC_SET_LEADCHNL_RA_SENSING_ANODE_ELECTRODE_1: NORMAL
MDC_IDC_SET_LEADCHNL_RA_SENSING_ANODE_LOCATION_1: NORMAL
MDC_IDC_SET_LEADCHNL_RA_SENSING_CATHODE_ELECTRODE_1: NORMAL
MDC_IDC_SET_LEADCHNL_RA_SENSING_CATHODE_LOCATION_1: NORMAL
MDC_IDC_SET_LEADCHNL_RA_SENSING_POLARITY: NORMAL
MDC_IDC_SET_LEADCHNL_RA_SENSING_SENSITIVITY: 0.3 MV
MDC_IDC_SET_LEADCHNL_RV_PACING_AMPLITUDE: 4.25 V
MDC_IDC_SET_LEADCHNL_RV_PACING_ANODE_ELECTRODE_1: NORMAL
MDC_IDC_SET_LEADCHNL_RV_PACING_ANODE_LOCATION_1: NORMAL
MDC_IDC_SET_LEADCHNL_RV_PACING_CAPTURE_MODE: NORMAL
MDC_IDC_SET_LEADCHNL_RV_PACING_CATHODE_ELECTRODE_1: NORMAL
MDC_IDC_SET_LEADCHNL_RV_PACING_CATHODE_LOCATION_1: NORMAL
MDC_IDC_SET_LEADCHNL_RV_PACING_POLARITY: NORMAL
MDC_IDC_SET_LEADCHNL_RV_PACING_PULSEWIDTH: 0.4 MS
MDC_IDC_SET_LEADCHNL_RV_SENSING_ANODE_ELECTRODE_1: NORMAL
MDC_IDC_SET_LEADCHNL_RV_SENSING_ANODE_LOCATION_1: NORMAL
MDC_IDC_SET_LEADCHNL_RV_SENSING_CATHODE_ELECTRODE_1: NORMAL
MDC_IDC_SET_LEADCHNL_RV_SENSING_CATHODE_LOCATION_1: NORMAL
MDC_IDC_SET_LEADCHNL_RV_SENSING_POLARITY: NORMAL
MDC_IDC_SET_LEADCHNL_RV_SENSING_SENSITIVITY: 0.9 MV
MDC_IDC_SET_ZONE_DETECTION_INTERVAL: 350 MS
MDC_IDC_SET_ZONE_DETECTION_INTERVAL: 400 MS
MDC_IDC_SET_ZONE_STATUS: NORMAL
MDC_IDC_SET_ZONE_STATUS: NORMAL
MDC_IDC_SET_ZONE_TYPE: NORMAL
MDC_IDC_SET_ZONE_VENDOR_TYPE: NORMAL
MDC_IDC_STAT_AT_BURDEN_PERCENT: 0.2 %
MDC_IDC_STAT_AT_DTM_END: NORMAL
MDC_IDC_STAT_AT_DTM_START: NORMAL
MDC_IDC_STAT_BRADY_AP_VP_PERCENT: 91.57 %
MDC_IDC_STAT_BRADY_AP_VS_PERCENT: 0.02 %
MDC_IDC_STAT_BRADY_AS_VP_PERCENT: 8.41 %
MDC_IDC_STAT_BRADY_AS_VS_PERCENT: 0 %
MDC_IDC_STAT_BRADY_DTM_END: NORMAL
MDC_IDC_STAT_BRADY_DTM_START: NORMAL
MDC_IDC_STAT_BRADY_RA_PERCENT_PACED: 91.34 %
MDC_IDC_STAT_BRADY_RV_PERCENT_PACED: 99.95 %
MDC_IDC_STAT_EPISODE_RECENT_COUNT: 0
MDC_IDC_STAT_EPISODE_RECENT_COUNT: 9
MDC_IDC_STAT_EPISODE_RECENT_COUNT_DTM_END: NORMAL
MDC_IDC_STAT_EPISODE_RECENT_COUNT_DTM_START: NORMAL
MDC_IDC_STAT_EPISODE_TOTAL_COUNT: 0
MDC_IDC_STAT_EPISODE_TOTAL_COUNT: 0
MDC_IDC_STAT_EPISODE_TOTAL_COUNT: 14
MDC_IDC_STAT_EPISODE_TOTAL_COUNT: 222
MDC_IDC_STAT_EPISODE_TOTAL_COUNT_DTM_END: NORMAL
MDC_IDC_STAT_EPISODE_TOTAL_COUNT_DTM_START: NORMAL
MDC_IDC_STAT_EPISODE_TYPE: NORMAL
MDC_IDC_STAT_TACHYTHERAPY_RECENT_DTM_END: NORMAL
MDC_IDC_STAT_TACHYTHERAPY_RECENT_DTM_START: NORMAL
MDC_IDC_STAT_TACHYTHERAPY_TOTAL_DTM_END: NORMAL
MDC_IDC_STAT_TACHYTHERAPY_TOTAL_DTM_START: NORMAL

## 2024-10-03 DIAGNOSIS — C90.00 MULTIPLE MYELOMA NOT HAVING ACHIEVED REMISSION (H): Primary | ICD-10-CM

## 2024-10-04 ENCOUNTER — TELEPHONE (OUTPATIENT)
Dept: PHARMACY | Facility: CLINIC | Age: 89
End: 2024-10-04
Payer: MEDICARE

## 2024-10-04 NOTE — TELEPHONE ENCOUNTER
Oral Chemotherapy Monitoring Program   Medication: Pomalyst  Rx: 2mg PO daily on days 1 through 14 of 21 day cycle   Auth #:  [id number]  Risk Category: adult female of non child bearing potential  Routine survey questions reviewed.   Rx to be Escribed to [facility]     Terrie Bowen [facility] Oncology Pharmacy Liaison  [phone]

## 2024-10-09 ENCOUNTER — LAB (OUTPATIENT)
Dept: INFUSION THERAPY | Facility: CLINIC | Age: 89
End: 2024-10-09
Attending: NURSE PRACTITIONER
Payer: MEDICARE

## 2024-10-09 ENCOUNTER — ONCOLOGY VISIT (OUTPATIENT)
Dept: ONCOLOGY | Facility: CLINIC | Age: 89
End: 2024-10-09
Attending: INTERNAL MEDICINE
Payer: MEDICARE

## 2024-10-09 ENCOUNTER — INFUSION THERAPY VISIT (OUTPATIENT)
Dept: INFUSION THERAPY | Facility: CLINIC | Age: 89
End: 2024-10-09
Attending: INTERNAL MEDICINE
Payer: MEDICARE

## 2024-10-09 VITALS
BODY MASS INDEX: 26.28 KG/M2 | HEART RATE: 93 BPM | RESPIRATION RATE: 16 BRPM | DIASTOLIC BLOOD PRESSURE: 84 MMHG | OXYGEN SATURATION: 95 % | SYSTOLIC BLOOD PRESSURE: 139 MMHG | TEMPERATURE: 98.1 F | WEIGHT: 139 LBS

## 2024-10-09 DIAGNOSIS — C90.00 MULTIPLE MYELOMA NOT HAVING ACHIEVED REMISSION (H): Primary | ICD-10-CM

## 2024-10-09 DIAGNOSIS — R60.0 LEG EDEMA, RIGHT: ICD-10-CM

## 2024-10-09 LAB
ALBUMIN SERPL BCG-MCNC: 4 G/DL (ref 3.5–5.2)
ALP SERPL-CCNC: 99 U/L (ref 40–150)
ALT SERPL W P-5'-P-CCNC: 8 U/L (ref 0–50)
ANION GAP SERPL CALCULATED.3IONS-SCNC: 9 MMOL/L (ref 7–15)
AST SERPL W P-5'-P-CCNC: 15 U/L (ref 0–45)
BASOPHILS # BLD AUTO: 0.1 10E3/UL (ref 0–0.2)
BASOPHILS NFR BLD AUTO: 1 %
BILIRUB SERPL-MCNC: 0.4 MG/DL
BUN SERPL-MCNC: 22.3 MG/DL (ref 8–23)
CALCIUM SERPL-MCNC: 8.6 MG/DL (ref 8.8–10.4)
CHLORIDE SERPL-SCNC: 106 MMOL/L (ref 98–107)
CREAT SERPL-MCNC: 0.7 MG/DL (ref 0.51–0.95)
EGFRCR SERPLBLD CKD-EPI 2021: 83 ML/MIN/1.73M2
EOSINOPHIL # BLD AUTO: 0.2 10E3/UL (ref 0–0.7)
EOSINOPHIL NFR BLD AUTO: 3 %
ERYTHROCYTE [DISTWIDTH] IN BLOOD BY AUTOMATED COUNT: 15.8 % (ref 10–15)
GLUCOSE SERPL-MCNC: 92 MG/DL (ref 70–99)
HCO3 SERPL-SCNC: 24 MMOL/L (ref 22–29)
HCT VFR BLD AUTO: 31.7 % (ref 35–47)
HGB BLD-MCNC: 10.3 G/DL (ref 11.7–15.7)
IMM GRANULOCYTES # BLD: 0 10E3/UL
IMM GRANULOCYTES NFR BLD: 0 %
LYMPHOCYTES # BLD AUTO: 2.1 10E3/UL (ref 0.8–5.3)
LYMPHOCYTES NFR BLD AUTO: 36 %
MCH RBC QN AUTO: 29.3 PG (ref 26.5–33)
MCHC RBC AUTO-ENTMCNC: 32.5 G/DL (ref 31.5–36.5)
MCV RBC AUTO: 90 FL (ref 78–100)
MONOCYTES # BLD AUTO: 1.1 10E3/UL (ref 0–1.3)
MONOCYTES NFR BLD AUTO: 19 %
NEUTROPHILS # BLD AUTO: 2.4 10E3/UL (ref 1.6–8.3)
NEUTROPHILS NFR BLD AUTO: 41 %
NRBC # BLD AUTO: 0 10E3/UL
NRBC BLD AUTO-RTO: 0 /100
PLATELET # BLD AUTO: 160 10E3/UL (ref 150–450)
POTASSIUM SERPL-SCNC: 4.9 MMOL/L (ref 3.4–5.3)
PROT SERPL-MCNC: 7.1 G/DL (ref 6.4–8.3)
RBC # BLD AUTO: 3.52 10E6/UL (ref 3.8–5.2)
SODIUM SERPL-SCNC: 139 MMOL/L (ref 135–145)
TOTAL PROTEIN SERUM FOR ELP: 6.3 G/DL (ref 6.4–8.3)
WBC # BLD AUTO: 5.8 10E3/UL (ref 4–11)

## 2024-10-09 PROCEDURE — 84165 PROTEIN E-PHORESIS SERUM: CPT | Mod: TC | Performed by: PATHOLOGY

## 2024-10-09 PROCEDURE — 250N000011 HC RX IP 250 OP 636: Mod: JZ | Performed by: INTERNAL MEDICINE

## 2024-10-09 PROCEDURE — 96401 CHEMO ANTI-NEOPL SQ/IM: CPT

## 2024-10-09 PROCEDURE — 36415 COLL VENOUS BLD VENIPUNCTURE: CPT | Performed by: INTERNAL MEDICINE

## 2024-10-09 PROCEDURE — 99214 OFFICE O/P EST MOD 30 MIN: CPT | Performed by: NURSE PRACTITIONER

## 2024-10-09 PROCEDURE — 85025 COMPLETE CBC W/AUTO DIFF WBC: CPT | Performed by: INTERNAL MEDICINE

## 2024-10-09 PROCEDURE — 84155 ASSAY OF PROTEIN SERUM: CPT | Performed by: INTERNAL MEDICINE

## 2024-10-09 PROCEDURE — 82784 ASSAY IGA/IGD/IGG/IGM EACH: CPT | Performed by: INTERNAL MEDICINE

## 2024-10-09 PROCEDURE — G0463 HOSPITAL OUTPT CLINIC VISIT: HCPCS | Performed by: NURSE PRACTITIONER

## 2024-10-09 PROCEDURE — 83521 IG LIGHT CHAINS FREE EACH: CPT | Performed by: INTERNAL MEDICINE

## 2024-10-09 PROCEDURE — 84165 PROTEIN E-PHORESIS SERUM: CPT | Mod: 26 | Performed by: PATHOLOGY

## 2024-10-09 RX ADMIN — DARATUMUMAB AND HYALURONIDASE-FIHJ (HUMAN RECOMBINANT) 1800 MG: 1800; 30000 INJECTION SUBCUTANEOUS at 13:19

## 2024-10-09 ASSESSMENT — PAIN SCALES - GENERAL: PAINLEVEL: NO PAIN (0)

## 2024-10-09 NOTE — LETTER
"10/9/2024      Quiana Dunaway  [address] [facility] [address]      Dear Colleague,    Thank you for referring your patient, Quiana Dunaway, to the [facility] CANCER [facility]. Please see a copy of my visit note below.    Oncology Rooming Note    October 9, 2024 11:51 AM   Quiana Dunaway is a 89 year old female who presents for:    Chief Complaint   Patient presents with     Oncology Clinic Visit     Initial Vitals: /84   Pulse 93   Temp 98.1  F (36.7  C) (Oral)   Resp 16   Wt 63 kg (139 lb)   LMP  (LMP Unknown)   SpO2 95%   BMI 26.28 kg/m   Estimated body mass index is 26.28 kg/m  as calculated from the following:    Height as of 9/12/24: 1.549 m (5' 0.98\").    Weight as of this encounter: 63 kg (139 lb). Body surface area is 1.65 meters squared.  No Pain (0) Comment: Data Unavailable   No LMP recorded (lmp unknown). Patient is postmenopausal.  Allergies reviewed: Yes  Medications reviewed: Yes    Medications: Medication refills not needed today.  Pharmacy name entered into [employer]:    [facility] - [place], [place] - [address]  [id number] - [place], [place] - [address] AT [facility] & [address]    Frailty Screening:   Is the patient here for a new oncology consult visit in cancer care? 2. No    Feeling dizzy and weak    Lauren Arredondo [facility]              Oncology/Hematology Visit Note  Oct 9, 2024    Reason for Visit: follow up of multiple myeloma IgG kappa high risk  Diagnosed 2017  Follows Dr. Parry  -08/14/2017 -04/30/2018: Velcade, Revlimid and dexamethasone Velcade stopped. Revlimid and dexamethasone continued.  12/30/2024: CT chest, abdomen, and pelvis does not reveal any malignancy.  -Revlimid held between 12/27/2021 and 03/08/2022.  -Daratumumab added on 11/14/2022.  03/13/2023- patient diagnosed with COVID her PCP prescribed molnupiravir.  Which she completed  -We held treatment  -Changed to daratumumab, " pomalidomide and dexamethasone on 06/12/2023.  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  Patient is  currently undergoing treatment with daratumumab pomalidomide and dexamethasone. Due to the side effects Pomalyst reduced to 2 mg 2 weeks on 1 week off  -6/4/2024: current treatment: Pomalyst 2 mg (14 days on, 1 week off), daratumumab monthly, and dexamethasone 4 mg weekly        Interval History:  Patient complains of right mild swelling of the leg started about 3 days ago.  She denies calf pain redness.  Denies difficulty with gait denies cough shortness of breath or chest pain.  Denies fever chills sweats.  Continues to have some intermittent diarrhea she took Imodium yesterday diarrhea stopped she reports no diarrhea today, denies abdominal pain , denies nausea or vomiting      Review of Systems:  14 point ROS of systems including Constitutional, Eyes, Respiratory, Cardiovascular, Gastroenterology, Genitourinary, Integumentary, Muscularskeletal, Psychiatric were all negative except for pertinent positives noted in my HPI.    Physical Examination:  Physical Exam  HENT:      Head: Normocephalic.   Eyes:      Pupils: Pupils are equal, round, and reactive to light.   Cardiovascular:      Rate and Rhythm: Normal rate.   Pulmonary:      Effort: Pulmonary effort is normal.      Breath sounds: Normal breath sounds.   Abdominal:      General: Bowel sounds are normal.      Palpations: Abdomen is soft.   Musculoskeletal:         General: Normal range of motion.      Cervical back: Normal range of motion.      Right lower leg: Edema present.   Neurological:      Mental Status: She is alert.         LABS  -CBC, CMP labs reviewed  MM labs pending        Assessment and Plan:    multiple myeloma IgG kappa high risk  Diagnosed 2017  Patient Dr. Parry  Patient is undergoing treatment with daratumumab, pomalidomide 2mg-( 2 weeks on 1 week off and 1 week off ) dose reduced due to side effects,  dexamethasone 4 mg once a week  -labs reviewed; MM labs pending  -Labs reviewed ok to proceed with treatment    Right leg edema-  Mild edema nontender to touch no redness noted  I informed patient that she is at high risk for developing blood clot due to multiple myeloma and Pomalyst.  She is taking aspirin  -I recommended stat ultrasound of the leg to rule out DVT to be done today. Patient reports she cannot do it today and she will call our clinic to schedule ultrasound of the leg in the next few days.  I informed patient that if she has blood clot in her leg the clot can travel to the lungs and cause pulmonary embolism which can end up in catastrophic event/death-patient verbalized understanding  Advised patient in the event of worsening swelling of the leg, pain ,cough, shortness of breath chest pain to call 911-patient verbalized understanding    Prophylaxis  Continue with aspirin and acyclovir    Anemia   stable  Patient denies bleeding  -will continue to monitor    Bone health  Scheduled for multiple tooth extractions 7/8/2024  Continue to hold Zometa until dental work-up is done  Continue to hold Zometa    Generalized weakness  Chronic issue for the patient    Please call clinic with any changes in health condition or question    MARIUSZ Mckeon CNP        Chart documentation with Dragon Voice recognition Software. Although reviewed after completion, some words and grammatical errors may remain.      Again, thank you for allowing me to participate in the care of your patient.        Sincerely,        MARIUSZ Mckeon CNP

## 2024-10-09 NOTE — PROGRESS NOTES
"Oncology Rooming Note    October 9, 2024 11:51 AM   Quiana Dunaway is a 89 year old female who presents for:    Chief Complaint   Patient presents with    Oncology Clinic Visit     Initial Vitals: /84   Pulse 93   Temp 98.1  F (36.7  C) (Oral)   Resp 16   Wt 63 kg (139 lb)   LMP  (LMP Unknown)   SpO2 95%   BMI 26.28 kg/m   Estimated body mass index is 26.28 kg/m  as calculated from the following:    Height as of 9/12/24: 1.549 m (5' 0.98\").    Weight as of this encounter: 63 kg (139 lb). Body surface area is 1.65 meters squared.  No Pain (0) Comment: Data Unavailable   No LMP recorded (lmp unknown). Patient is postmenopausal.  Allergies reviewed: Yes  Medications reviewed: Yes    Medications: Medication refills not needed today.  Pharmacy name entered into 2Checkout:    [facility] - [place], [place] - [address]  [id number] - [place], [place] - [address] [facility] [address] AT [facility]    Frailty Screening:   Is the patient here for a new oncology consult visit in cancer care? 2. No    Feeling dizzy and weak    Lauren Arredondo CMA            "

## 2024-10-09 NOTE — PROGRESS NOTES
Oncology/Hematology Visit Note  Oct 9, 2024    Reason for Visit: follow up of multiple myeloma IgG kappa high risk  Diagnosed 2017  Follows Dr. Parry  -08/14/2017 -04/30/2018: Velcade, Revlimid and dexamethasone Velcade stopped. Revlimid and dexamethasone continued.  12/30/2024: CT chest, abdomen, and pelvis does not reveal any malignancy.  -Revlimid held between 12/27/2021 and 03/08/2022.  -Daratumumab added on 11/14/2022.  03/13/2023- patient diagnosed with COVID her PCP prescribed molnupiravir.  Which she completed  -We held treatment  -Changed to daratumumab, pomalidomide and dexamethasone on 06/12/2023.  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  Patient is  currently undergoing treatment with daratumumab pomalidomide and dexamethasone. Due to the side effects Pomalyst reduced to 2 mg 2 weeks on 1 week off  -6/4/2024: current treatment: Pomalyst 2 mg (14 days on, 1 week off), daratumumab monthly, and dexamethasone 4 mg weekly        Interval History:  Patient complains of right mild swelling of the leg started about 3 days ago.  She denies calf pain redness.  Denies difficulty with gait denies cough shortness of breath or chest pain.  Denies fever chills sweats.  Continues to have some intermittent diarrhea she took Imodium yesterday diarrhea stopped she reports no diarrhea today, denies abdominal pain , denies nausea or vomiting      Review of Systems:  14 point ROS of systems including Constitutional, Eyes, Respiratory, Cardiovascular, Gastroenterology, Genitourinary, Integumentary, Muscularskeletal, Psychiatric were all negative except for pertinent positives noted in my HPI.    Physical Examination:  Physical Exam  HENT:      Head: Normocephalic.   Eyes:      Pupils: Pupils are equal, round, and reactive to light.   Cardiovascular:      Rate and Rhythm: Normal rate.   Pulmonary:      Effort: Pulmonary effort is normal.      Breath sounds: Normal breath sounds.   Abdominal:       General: Bowel sounds are normal.      Palpations: Abdomen is soft.   Musculoskeletal:         General: Normal range of motion.      Cervical back: Normal range of motion.      Right lower leg: Edema present.   Neurological:      Mental Status: She is alert.         LABS  -CBC, CMP labs reviewed  MM labs pending        Assessment and Plan:    multiple myeloma IgG kappa high risk  Diagnosed 2017  Patient Dr. Parry  Patient is undergoing treatment with daratumumab, pomalidomide 2mg-( 2 weeks on 1 week off and 1 week off ) dose reduced due to side effects, dexamethasone 4 mg once a week  -labs reviewed; MM labs pending  -Labs reviewed ok to proceed with treatment    Right leg edema-  Mild edema nontender to touch no redness noted  I informed patient that she is at high risk for developing blood clot due to multiple myeloma and Pomalyst.  She is taking aspirin  -I recommended stat ultrasound of the leg to rule out DVT to be done today. Patient reports she cannot do it today and she will call our clinic to schedule ultrasound of the leg in the next few days.  I informed patient that if she has blood clot in her leg the clot can travel to the lungs and cause pulmonary embolism which can end up in catastrophic event/death-patient verbalized understanding  Advised patient in the event of worsening swelling of the leg, pain ,cough, shortness of breath chest pain to call 911-patient verbalized understanding    Prophylaxis  Continue with aspirin and acyclovir    Anemia   stable  Patient denies bleeding  -will continue to monitor    Bone health  Scheduled for multiple tooth extractions 7/8/2024  Continue to hold Zometa until dental work-up is done  Continue to hold Zometa    Generalized weakness  Chronic issue for the patient    Please call clinic with any changes in health condition or question    MARIUSZ Mckeon CNP        Chart documentation with Dragon Voice recognition Software. Although reviewed after completion, some  words and grammatical errors may remain.

## 2024-10-09 NOTE — PROGRESS NOTES
Infusion Nursing Note:  Quiana Dunaway presents today for C24D1: Darzalex Faspro.    Patient seen by provider today: Yes: Juaquin Hammond NP   present during visit today: Not Applicable.    Note: Patient reports to feeling well overall and denies any new concerns since his visit with Juaquin Hammond NP.      Intravenous Access:  No Intravenous access/labs at this visit.    Treatment Conditions:  Lab Results   Component Value Date    HGB 10.3 (L) 10/09/2024    WBC 5.8 10/09/2024    ANEU 2.1 06/03/2024    ANEUTAUTO 2.4 10/09/2024     10/09/2024        Lab Results   Component Value Date     10/09/2024    POTASSIUM 4.9 10/09/2024    MAG 2.4 (H) 05/26/2020    CR 0.70 10/09/2024    HUMBERTO 8.6 (L) 10/09/2024    BILITOTAL 0.4 10/09/2024    ALBUMIN 4.0 10/09/2024    ALT 8 10/09/2024    AST 15 10/09/2024       Results reviewed, labs MET treatment parameters, ok to proceed with treatment.      Post Infusion Assessment:  Patient tolerated one Darzalex Faspro injection into her right abdomen without incident.       Discharge Plan:   Patient declined prescription refills.  Discharge instructions reviewed with: Patient.  Patient and/or family verbalized understanding of discharge instructions and all questions answered.  AVS to patient via CodemediaT.  Patient has no future appointments scheduled at this time.  Scheduling to contact patient with an updated schedule.  Patient discharged in stable condition accompanied by: self.  Departure Mode: Ambulatory.      Michelle Quintana RN

## 2024-10-10 LAB
ALBUMIN SERPL ELPH-MCNC: 3.9 G/DL (ref 3.7–5.1)
ALPHA1 GLOB SERPL ELPH-MCNC: 0.3 G/DL (ref 0.2–0.4)
ALPHA2 GLOB SERPL ELPH-MCNC: 0.6 G/DL (ref 0.5–0.9)
B-GLOBULIN SERPL ELPH-MCNC: 0.6 G/DL (ref 0.6–1)
GAMMA GLOB SERPL ELPH-MCNC: 0.9 G/DL (ref 0.7–1.6)
IGG SERPL-MCNC: 960 MG/DL (ref 610–1616)
KAPPA LC FREE SER-MCNC: 57.54 MG/DL (ref 0.33–1.94)
KAPPA LC FREE/LAMBDA FREE SER NEPH: 99.21 {RATIO} (ref 0.26–1.65)
LAMBDA LC FREE SERPL-MCNC: 0.58 MG/DL (ref 0.57–2.63)
LOCATION OF TASK: ABNORMAL
M PROTEIN SERPL ELPH-MCNC: 0.7 G/DL
PROT PATTERN SERPL ELPH-IMP: ABNORMAL

## 2024-10-10 NOTE — RESULT ENCOUNTER NOTE
Dear Ms. Dunaway,    Blood test reveals myeloma to be overall stable.    Please, call me with any questions.    Alex Parry MD

## 2024-10-21 ENCOUNTER — OFFICE VISIT (OUTPATIENT)
Dept: FAMILY MEDICINE | Facility: CLINIC | Age: 89
End: 2024-10-21
Payer: MEDICARE

## 2024-10-21 VITALS
BODY MASS INDEX: 26.52 KG/M2 | HEART RATE: 85 BPM | RESPIRATION RATE: 16 BRPM | OXYGEN SATURATION: 95 % | WEIGHT: 135.1 LBS | TEMPERATURE: 98.9 F | DIASTOLIC BLOOD PRESSURE: 69 MMHG | SYSTOLIC BLOOD PRESSURE: 122 MMHG | HEIGHT: 60 IN

## 2024-10-21 DIAGNOSIS — T14.8XXA HEMATOMA OF SKIN: ICD-10-CM

## 2024-10-21 DIAGNOSIS — L29.9 EAR ITCH: Primary | ICD-10-CM

## 2024-10-21 DIAGNOSIS — L03.011 HANGNAIL OF DIGIT OF RIGHT HAND: ICD-10-CM

## 2024-10-21 DIAGNOSIS — Z23 HIGH PRIORITY FOR 2019-NCOV VACCINE: ICD-10-CM

## 2024-10-21 PROCEDURE — 91320 SARSCV2 VAC 30MCG TRS-SUC IM: CPT | Performed by: INTERNAL MEDICINE

## 2024-10-21 PROCEDURE — 90480 ADMN SARSCOV2 VAC 1/ONLY CMP: CPT | Performed by: INTERNAL MEDICINE

## 2024-10-21 PROCEDURE — 99213 OFFICE O/P EST LOW 20 MIN: CPT | Performed by: INTERNAL MEDICINE

## 2024-10-21 ASSESSMENT — PAIN SCALES - GENERAL: PAINLEVEL: NO PAIN (0)

## 2024-10-21 NOTE — PROGRESS NOTES
Assessment & Plan     Ear itch  Recommended applying 1% hydrocortisone cream to the outside of the left ear as needed for itchiness up to twice daily, demonstrated how to do so.    Hangnail of digit of right hand  Trimmed hangnail on first digit of right hand    Hematoma of skin  Advise applying heat for 10 minutes 1-2 times a day advised that it may take several months for the hematoma resolved completely        FUTURE APPOINTMENTS:       -She appreciates short-term follow-up and we plan for follow-up in the 6-week interval    Dirk Araya is a 89 year old, presenting for the following health issues:  Follow Up    History of Present Illness       Hypertension: She presents for follow up of hypertension.  She does check blood pressure  regularly outside of the clinic. Outside blood pressures have been over 140/90. She follows a low salt diet.     Reason for visit:  Blood pressure    She eats 0-1 servings of fruits and vegetables daily.She consumes 1 sweetened beverage(s) daily.She exercises with enough effort to increase her heart rate 9 or less minutes per day.  She exercises with enough effort to increase her heart rate 3 or less days per week.   She is taking medications regularly.           She has an itchy left ear, hangnail on her right thumb and a bruise resulting from a fall several weeks ago for which she was evaluated at the [facility] orthopedic urgent care distal to her left elbow          Objective    /69 (BP Location: Left arm, Patient Position: Sitting, Cuff Size: Adult Regular)   Pulse 85   Temp 98.9  F (37.2  C) (Tympanic)   Resp 16   Ht 1.524 m (5')   Wt 61.3 kg (135 lb 1.6 oz)   LMP  (LMP Unknown)   SpO2 95%   BMI 26.38 kg/m    Body mass index is 26.38 kg/m .  Physical Exam   General: This is a well-appearing elderly female in no acute distress.  She has a small amount of eczema on the left ear outside the ear canal, there is no wax in the canal, tympanic membrane is normal.   She has a hematoma on her distal left forearm, the elbow joint has full pain-free range of motion without areas of focal bony tenderness, there is a hangnail on the right first digit nail            Signed Electronically by: César Chung MD

## 2024-10-22 DIAGNOSIS — C90.00 MULTIPLE MYELOMA NOT HAVING ACHIEVED REMISSION (H): Primary | ICD-10-CM

## 2024-10-24 DIAGNOSIS — C90.00 MULTIPLE MYELOMA NOT HAVING ACHIEVED REMISSION (H): Primary | ICD-10-CM

## 2024-10-25 DIAGNOSIS — C90.00 MULTIPLE MYELOMA NOT HAVING ACHIEVED REMISSION (H): Primary | ICD-10-CM

## 2024-10-27 RX ORDER — MEPERIDINE HYDROCHLORIDE 25 MG/ML
25 INJECTION INTRAMUSCULAR; INTRAVENOUS; SUBCUTANEOUS
OUTPATIENT
Start: 2024-10-27

## 2024-10-27 RX ORDER — METHYLPREDNISOLONE SODIUM SUCCINATE 40 MG/ML
40 INJECTION INTRAMUSCULAR; INTRAVENOUS
Start: 2024-10-27

## 2024-10-27 RX ORDER — ALBUTEROL SULFATE 0.83 MG/ML
2.5 SOLUTION RESPIRATORY (INHALATION)
OUTPATIENT
Start: 2024-10-27

## 2024-10-27 RX ORDER — EPINEPHRINE 1 MG/ML
0.3 INJECTION, SOLUTION, CONCENTRATE INTRAVENOUS EVERY 5 MIN PRN
OUTPATIENT
Start: 2024-10-27

## 2024-10-27 RX ORDER — DIPHENHYDRAMINE HYDROCHLORIDE 50 MG/ML
50 INJECTION INTRAMUSCULAR; INTRAVENOUS
Start: 2024-10-27

## 2024-10-27 RX ORDER — ALBUTEROL SULFATE 90 UG/1
1-2 INHALANT RESPIRATORY (INHALATION)
Start: 2024-10-27

## 2024-10-27 RX ORDER — DIPHENHYDRAMINE HYDROCHLORIDE 50 MG/ML
25 INJECTION INTRAMUSCULAR; INTRAVENOUS
Start: 2024-10-27

## 2024-10-28 ENCOUNTER — HOSPITAL ENCOUNTER (OUTPATIENT)
Dept: ULTRASOUND IMAGING | Facility: CLINIC | Age: 89
Discharge: HOME | End: 2024-10-28
Attending: NURSE PRACTITIONER | Admitting: NURSE PRACTITIONER
Payer: MEDICARE

## 2024-10-28 DIAGNOSIS — R60.0 LEG EDEMA, RIGHT: ICD-10-CM

## 2024-10-28 DIAGNOSIS — C90.00 MULTIPLE MYELOMA NOT HAVING ACHIEVED REMISSION (H): ICD-10-CM

## 2024-10-28 PROCEDURE — 93971 EXTREMITY STUDY: CPT | Mod: RT

## 2024-10-28 NOTE — RESULT ENCOUNTER NOTE
The following letter pertains to your most recent diagnostic tests:    Good news! The ultrasound does NOT show a dangerous blood clot in the right leg.      Sincerely,    Dr. Chung

## 2024-10-29 ENCOUNTER — ANCILLARY PROCEDURE (OUTPATIENT)
Dept: CARDIOLOGY | Facility: CLINIC | Age: 89
End: 2024-10-29
Attending: INTERNAL MEDICINE
Payer: MEDICARE

## 2024-10-29 DIAGNOSIS — Z95.0 CARDIAC PACEMAKER IN SITU: ICD-10-CM

## 2024-10-29 DIAGNOSIS — I49.5 SICK SINUS SYNDROME (H): ICD-10-CM

## 2024-10-29 PROCEDURE — 93294 REM INTERROG EVL PM/LDLS PM: CPT | Performed by: INTERNAL MEDICINE

## 2024-10-29 PROCEDURE — 93296 REM INTERROG EVL PM/IDS: CPT | Performed by: INTERNAL MEDICINE

## 2024-10-30 ENCOUNTER — TELEPHONE (OUTPATIENT)
Dept: PHARMACY | Facility: CLINIC | Age: 89
End: 2024-10-30
Payer: MEDICARE

## 2024-10-30 NOTE — TELEPHONE ENCOUNTER
Oral Chemotherapy Monitoring Program   Medication: Pomalyst  Rx: 2mg PO daily on days 1 through 14 of 21 day cycle   Auth #:  [id number]  Risk Category: adult female of non child bearing potential  Routine survey questions reviewed.   Rx to be Escribed to [facility]     Terrie Bowen [facility] Oncology Pharmacy Liaison  [phone]

## 2024-10-31 LAB
MDC_IDC_EPISODE_DTM: NORMAL
MDC_IDC_EPISODE_DURATION: 118 S
MDC_IDC_EPISODE_DURATION: 201 S
MDC_IDC_EPISODE_DURATION: 211 S
MDC_IDC_EPISODE_DURATION: 2586 S
MDC_IDC_EPISODE_DURATION: 2985 S
MDC_IDC_EPISODE_DURATION: 300 S
MDC_IDC_EPISODE_DURATION: 304 S
MDC_IDC_EPISODE_DURATION: 321 S
MDC_IDC_EPISODE_DURATION: 412 S
MDC_IDC_EPISODE_DURATION: 416 S
MDC_IDC_EPISODE_DURATION: 4720 S
MDC_IDC_EPISODE_DURATION: 502 S
MDC_IDC_EPISODE_DURATION: 76 S
MDC_IDC_EPISODE_DURATION: 791 S
MDC_IDC_EPISODE_DURATION: 836 S
MDC_IDC_EPISODE_DURATION: 95 S
MDC_IDC_EPISODE_DURATION: NORMAL S
MDC_IDC_EPISODE_ID: 240
MDC_IDC_EPISODE_ID: 241
MDC_IDC_EPISODE_ID: 242
MDC_IDC_EPISODE_ID: 243
MDC_IDC_EPISODE_ID: 244
MDC_IDC_EPISODE_ID: 245
MDC_IDC_EPISODE_ID: 246
MDC_IDC_EPISODE_ID: 247
MDC_IDC_EPISODE_ID: 248
MDC_IDC_EPISODE_ID: 249
MDC_IDC_EPISODE_ID: 250
MDC_IDC_EPISODE_ID: 251
MDC_IDC_EPISODE_ID: 252
MDC_IDC_EPISODE_ID: 253
MDC_IDC_EPISODE_ID: 254
MDC_IDC_EPISODE_ID: 255
MDC_IDC_EPISODE_ID: 256
MDC_IDC_EPISODE_TYPE: NORMAL
MDC_IDC_LEAD_CONNECTION_STATUS: NORMAL
MDC_IDC_LEAD_CONNECTION_STATUS: NORMAL
MDC_IDC_LEAD_IMPLANT_DT: NORMAL
MDC_IDC_LEAD_IMPLANT_DT: NORMAL
MDC_IDC_LEAD_LOCATION: NORMAL
MDC_IDC_LEAD_LOCATION: NORMAL
MDC_IDC_LEAD_LOCATION_DETAIL_1: NORMAL
MDC_IDC_LEAD_LOCATION_DETAIL_1: NORMAL
MDC_IDC_LEAD_MFG: NORMAL
MDC_IDC_LEAD_MFG: NORMAL
MDC_IDC_LEAD_MODEL: NORMAL
MDC_IDC_LEAD_MODEL: NORMAL
MDC_IDC_LEAD_POLARITY_TYPE: NORMAL
MDC_IDC_LEAD_POLARITY_TYPE: NORMAL
MDC_IDC_LEAD_SERIAL: NORMAL
MDC_IDC_LEAD_SERIAL: NORMAL
MDC_IDC_MSMT_BATTERY_DTM: NORMAL
MDC_IDC_MSMT_BATTERY_REMAINING_LONGEVITY: 7 MO
MDC_IDC_MSMT_BATTERY_RRT_TRIGGER: 2.83
MDC_IDC_MSMT_BATTERY_STATUS: NORMAL
MDC_IDC_MSMT_BATTERY_VOLTAGE: 2.87 V
MDC_IDC_MSMT_LEADCHNL_RA_IMPEDANCE_VALUE: 342 OHM
MDC_IDC_MSMT_LEADCHNL_RA_IMPEDANCE_VALUE: 342 OHM
MDC_IDC_MSMT_LEADCHNL_RA_PACING_THRESHOLD_AMPLITUDE: 0.5 V
MDC_IDC_MSMT_LEADCHNL_RA_PACING_THRESHOLD_PULSEWIDTH: 0.4 MS
MDC_IDC_MSMT_LEADCHNL_RA_SENSING_INTR_AMPL: 1.88 MV
MDC_IDC_MSMT_LEADCHNL_RA_SENSING_INTR_AMPL: 1.88 MV
MDC_IDC_MSMT_LEADCHNL_RV_IMPEDANCE_VALUE: 380 OHM
MDC_IDC_MSMT_LEADCHNL_RV_IMPEDANCE_VALUE: 475 OHM
MDC_IDC_MSMT_LEADCHNL_RV_PACING_THRESHOLD_AMPLITUDE: 1.38 V
MDC_IDC_MSMT_LEADCHNL_RV_PACING_THRESHOLD_PULSEWIDTH: 0.4 MS
MDC_IDC_MSMT_LEADCHNL_RV_SENSING_INTR_AMPL: 9 MV
MDC_IDC_MSMT_LEADCHNL_RV_SENSING_INTR_AMPL: 9 MV
MDC_IDC_PG_IMPLANT_DTM: NORMAL
MDC_IDC_PG_MFG: NORMAL
MDC_IDC_PG_MODEL: NORMAL
MDC_IDC_PG_SERIAL: NORMAL
MDC_IDC_PG_TYPE: NORMAL
MDC_IDC_SESS_CLINIC_NAME: NORMAL
MDC_IDC_SESS_DTM: NORMAL
MDC_IDC_SESS_TYPE: NORMAL
MDC_IDC_SET_BRADY_AT_MODE_SWITCH_RATE: 171 {BEATS}/MIN
MDC_IDC_SET_BRADY_HYSTRATE: NORMAL
MDC_IDC_SET_BRADY_LOWRATE: 60 {BEATS}/MIN
MDC_IDC_SET_BRADY_MAX_SENSOR_RATE: 120 {BEATS}/MIN
MDC_IDC_SET_BRADY_MAX_TRACKING_RATE: 120 {BEATS}/MIN
MDC_IDC_SET_BRADY_MODE: NORMAL
MDC_IDC_SET_BRADY_PAV_DELAY_LOW: 200 MS
MDC_IDC_SET_BRADY_SAV_DELAY_LOW: 180 MS
MDC_IDC_SET_LEADCHNL_RA_PACING_AMPLITUDE: 1.5 V
MDC_IDC_SET_LEADCHNL_RA_PACING_ANODE_ELECTRODE_1: NORMAL
MDC_IDC_SET_LEADCHNL_RA_PACING_ANODE_LOCATION_1: NORMAL
MDC_IDC_SET_LEADCHNL_RA_PACING_CAPTURE_MODE: NORMAL
MDC_IDC_SET_LEADCHNL_RA_PACING_CATHODE_ELECTRODE_1: NORMAL
MDC_IDC_SET_LEADCHNL_RA_PACING_CATHODE_LOCATION_1: NORMAL
MDC_IDC_SET_LEADCHNL_RA_PACING_POLARITY: NORMAL
MDC_IDC_SET_LEADCHNL_RA_PACING_PULSEWIDTH: 0.4 MS
MDC_IDC_SET_LEADCHNL_RA_SENSING_ANODE_ELECTRODE_1: NORMAL
MDC_IDC_SET_LEADCHNL_RA_SENSING_ANODE_LOCATION_1: NORMAL
MDC_IDC_SET_LEADCHNL_RA_SENSING_CATHODE_ELECTRODE_1: NORMAL
MDC_IDC_SET_LEADCHNL_RA_SENSING_CATHODE_LOCATION_1: NORMAL
MDC_IDC_SET_LEADCHNL_RA_SENSING_POLARITY: NORMAL
MDC_IDC_SET_LEADCHNL_RA_SENSING_SENSITIVITY: 0.3 MV
MDC_IDC_SET_LEADCHNL_RV_PACING_AMPLITUDE: 3 V
MDC_IDC_SET_LEADCHNL_RV_PACING_ANODE_ELECTRODE_1: NORMAL
MDC_IDC_SET_LEADCHNL_RV_PACING_ANODE_LOCATION_1: NORMAL
MDC_IDC_SET_LEADCHNL_RV_PACING_CAPTURE_MODE: NORMAL
MDC_IDC_SET_LEADCHNL_RV_PACING_CATHODE_ELECTRODE_1: NORMAL
MDC_IDC_SET_LEADCHNL_RV_PACING_CATHODE_LOCATION_1: NORMAL
MDC_IDC_SET_LEADCHNL_RV_PACING_POLARITY: NORMAL
MDC_IDC_SET_LEADCHNL_RV_PACING_PULSEWIDTH: 0.4 MS
MDC_IDC_SET_LEADCHNL_RV_SENSING_ANODE_ELECTRODE_1: NORMAL
MDC_IDC_SET_LEADCHNL_RV_SENSING_ANODE_LOCATION_1: NORMAL
MDC_IDC_SET_LEADCHNL_RV_SENSING_CATHODE_ELECTRODE_1: NORMAL
MDC_IDC_SET_LEADCHNL_RV_SENSING_CATHODE_LOCATION_1: NORMAL
MDC_IDC_SET_LEADCHNL_RV_SENSING_POLARITY: NORMAL
MDC_IDC_SET_LEADCHNL_RV_SENSING_SENSITIVITY: 0.9 MV
MDC_IDC_SET_ZONE_DETECTION_INTERVAL: 350 MS
MDC_IDC_SET_ZONE_DETECTION_INTERVAL: 400 MS
MDC_IDC_SET_ZONE_STATUS: NORMAL
MDC_IDC_SET_ZONE_STATUS: NORMAL
MDC_IDC_SET_ZONE_TYPE: NORMAL
MDC_IDC_SET_ZONE_VENDOR_TYPE: NORMAL
MDC_IDC_STAT_AT_BURDEN_PERCENT: 1.1 %
MDC_IDC_STAT_AT_DTM_END: NORMAL
MDC_IDC_STAT_AT_DTM_START: NORMAL
MDC_IDC_STAT_BRADY_AP_VP_PERCENT: 84.95 %
MDC_IDC_STAT_BRADY_AP_VS_PERCENT: 0.02 %
MDC_IDC_STAT_BRADY_AS_VP_PERCENT: 15.03 %
MDC_IDC_STAT_BRADY_AS_VS_PERCENT: 0.01 %
MDC_IDC_STAT_BRADY_DTM_END: NORMAL
MDC_IDC_STAT_BRADY_DTM_START: NORMAL
MDC_IDC_STAT_BRADY_RA_PERCENT_PACED: 84.44 %
MDC_IDC_STAT_BRADY_RV_PERCENT_PACED: 99.92 %
MDC_IDC_STAT_EPISODE_RECENT_COUNT: 0
MDC_IDC_STAT_EPISODE_RECENT_COUNT: 17
MDC_IDC_STAT_EPISODE_RECENT_COUNT_DTM_END: NORMAL
MDC_IDC_STAT_EPISODE_RECENT_COUNT_DTM_START: NORMAL
MDC_IDC_STAT_EPISODE_TOTAL_COUNT: 0
MDC_IDC_STAT_EPISODE_TOTAL_COUNT: 0
MDC_IDC_STAT_EPISODE_TOTAL_COUNT: 14
MDC_IDC_STAT_EPISODE_TOTAL_COUNT: 239
MDC_IDC_STAT_EPISODE_TOTAL_COUNT_DTM_END: NORMAL
MDC_IDC_STAT_EPISODE_TOTAL_COUNT_DTM_START: NORMAL
MDC_IDC_STAT_EPISODE_TYPE: NORMAL
MDC_IDC_STAT_TACHYTHERAPY_RECENT_DTM_END: NORMAL
MDC_IDC_STAT_TACHYTHERAPY_RECENT_DTM_START: NORMAL
MDC_IDC_STAT_TACHYTHERAPY_TOTAL_DTM_END: NORMAL
MDC_IDC_STAT_TACHYTHERAPY_TOTAL_DTM_START: NORMAL

## 2024-11-04 ENCOUNTER — LAB (OUTPATIENT)
Dept: LAB | Facility: CLINIC | Age: 89
End: 2024-11-04
Payer: MEDICARE

## 2024-11-04 DIAGNOSIS — C90.00 MULTIPLE MYELOMA NOT HAVING ACHIEVED REMISSION (H): ICD-10-CM

## 2024-11-04 LAB
ALBUMIN SERPL BCG-MCNC: 4.1 G/DL (ref 3.5–5.2)
ALP SERPL-CCNC: 86 U/L (ref 40–150)
ALT SERPL W P-5'-P-CCNC: 7 U/L (ref 0–50)
ANION GAP SERPL CALCULATED.3IONS-SCNC: 10 MMOL/L (ref 7–15)
AST SERPL W P-5'-P-CCNC: 10 U/L (ref 0–45)
BASOPHILS # BLD MANUAL: 0.1 10E3/UL (ref 0–0.2)
BASOPHILS NFR BLD MANUAL: 2 %
BILIRUB SERPL-MCNC: 0.4 MG/DL
BUN SERPL-MCNC: 22 MG/DL (ref 8–23)
CALCIUM SERPL-MCNC: 9.1 MG/DL (ref 8.8–10.4)
CHLORIDE SERPL-SCNC: 105 MMOL/L (ref 98–107)
CREAT SERPL-MCNC: 0.83 MG/DL (ref 0.51–0.95)
DACRYOCYTES BLD QL SMEAR: SLIGHT
EGFRCR SERPLBLD CKD-EPI 2021: 67 ML/MIN/1.73M2
ELLIPTOCYTES BLD QL SMEAR: ABNORMAL
EOSINOPHIL # BLD MANUAL: 0.1 10E3/UL (ref 0–0.7)
EOSINOPHIL NFR BLD MANUAL: 1 %
ERYTHROCYTE [DISTWIDTH] IN BLOOD BY AUTOMATED COUNT: 16 % (ref 10–15)
FRAGMENTS BLD QL SMEAR: ABNORMAL
GLUCOSE SERPL-MCNC: 82 MG/DL (ref 70–99)
HCO3 SERPL-SCNC: 26 MMOL/L (ref 22–29)
HCT VFR BLD AUTO: 31.3 % (ref 35–47)
HGB BLD-MCNC: 10.2 G/DL (ref 11.7–15.7)
LYMPHOCYTES # BLD MANUAL: 2.9 10E3/UL (ref 0.8–5.3)
LYMPHOCYTES NFR BLD MANUAL: 43 %
MCH RBC QN AUTO: 29.8 PG (ref 26.5–33)
MCHC RBC AUTO-ENTMCNC: 32.6 G/DL (ref 31.5–36.5)
MCV RBC AUTO: 92 FL (ref 78–100)
MONOCYTES # BLD MANUAL: 1.3 10E3/UL (ref 0–1.3)
MONOCYTES NFR BLD MANUAL: 19 %
NEUTROPHILS # BLD MANUAL: 2.3 10E3/UL (ref 1.6–8.3)
NEUTROPHILS NFR BLD MANUAL: 35 %
PLAT MORPH BLD: ABNORMAL
PLATELET # BLD AUTO: 91 10E3/UL (ref 150–450)
POTASSIUM SERPL-SCNC: 4 MMOL/L (ref 3.4–5.3)
PROT SERPL-MCNC: 6.6 G/DL (ref 6.4–8.3)
RBC # BLD AUTO: 3.42 10E6/UL (ref 3.8–5.2)
RBC MORPH BLD: ABNORMAL
SODIUM SERPL-SCNC: 141 MMOL/L (ref 135–145)
WBC # BLD AUTO: 6.7 10E3/UL (ref 4–11)

## 2024-11-04 PROCEDURE — 85027 COMPLETE CBC AUTOMATED: CPT

## 2024-11-04 PROCEDURE — 85007 BL SMEAR W/DIFF WBC COUNT: CPT

## 2024-11-04 PROCEDURE — 80053 COMPREHEN METABOLIC PANEL: CPT

## 2024-11-04 PROCEDURE — 36415 COLL VENOUS BLD VENIPUNCTURE: CPT

## 2024-11-05 ENCOUNTER — ONCOLOGY VISIT (OUTPATIENT)
Dept: ONCOLOGY | Facility: CLINIC | Age: 89
End: 2024-11-05
Attending: NURSE PRACTITIONER
Payer: MEDICARE

## 2024-11-05 ENCOUNTER — LAB (OUTPATIENT)
Dept: INFUSION THERAPY | Facility: CLINIC | Age: 89
End: 2024-11-05
Attending: INTERNAL MEDICINE
Payer: MEDICARE

## 2024-11-05 VITALS
OXYGEN SATURATION: 95 % | RESPIRATION RATE: 14 BRPM | WEIGHT: 134 LBS | DIASTOLIC BLOOD PRESSURE: 61 MMHG | TEMPERATURE: 98 F | BODY MASS INDEX: 26.17 KG/M2 | HEART RATE: 75 BPM | SYSTOLIC BLOOD PRESSURE: 126 MMHG

## 2024-11-05 DIAGNOSIS — C90.00 MULTIPLE MYELOMA NOT HAVING ACHIEVED REMISSION (H): Primary | ICD-10-CM

## 2024-11-05 LAB
BASOPHILS # BLD AUTO: 0.1 10E3/UL (ref 0–0.2)
BASOPHILS NFR BLD AUTO: 1 %
EOSINOPHIL # BLD AUTO: 0.3 10E3/UL (ref 0–0.7)
EOSINOPHIL NFR BLD AUTO: 5 %
ERYTHROCYTE [DISTWIDTH] IN BLOOD BY AUTOMATED COUNT: 16 % (ref 10–15)
HCT VFR BLD AUTO: 34.3 % (ref 35–47)
HGB BLD-MCNC: 11.5 G/DL (ref 11.7–15.7)
HOLD SPECIMEN: NORMAL
IMM GRANULOCYTES # BLD: 0 10E3/UL
IMM GRANULOCYTES NFR BLD: 0 %
LYMPHOCYTES # BLD AUTO: 2.3 10E3/UL (ref 0.8–5.3)
LYMPHOCYTES NFR BLD AUTO: 43 %
MCH RBC QN AUTO: 30.3 PG (ref 26.5–33)
MCHC RBC AUTO-ENTMCNC: 33.5 G/DL (ref 31.5–36.5)
MCV RBC AUTO: 91 FL (ref 78–100)
MONOCYTES # BLD AUTO: 1.4 10E3/UL (ref 0–1.3)
MONOCYTES NFR BLD AUTO: 27 %
NEUTROPHILS # BLD AUTO: 1.3 10E3/UL (ref 1.6–8.3)
NEUTROPHILS NFR BLD AUTO: 24 %
NRBC # BLD AUTO: 0 10E3/UL
NRBC BLD AUTO-RTO: 0 /100
PLATELET # BLD AUTO: 99 10E3/UL (ref 150–450)
RBC # BLD AUTO: 3.79 10E6/UL (ref 3.8–5.2)
TOTAL PROTEIN SERUM FOR ELP: 6.4 G/DL (ref 6.4–8.3)
WBC # BLD AUTO: 5.3 10E3/UL (ref 4–11)

## 2024-11-05 PROCEDURE — 84165 PROTEIN E-PHORESIS SERUM: CPT | Mod: TC | Performed by: STUDENT IN AN ORGANIZED HEALTH CARE EDUCATION/TRAINING PROGRAM

## 2024-11-05 PROCEDURE — 85004 AUTOMATED DIFF WBC COUNT: CPT | Performed by: NURSE PRACTITIONER

## 2024-11-05 PROCEDURE — 99214 OFFICE O/P EST MOD 30 MIN: CPT | Performed by: NURSE PRACTITIONER

## 2024-11-05 PROCEDURE — 250N000011 HC RX IP 250 OP 636: Mod: JZ | Performed by: NURSE PRACTITIONER

## 2024-11-05 PROCEDURE — 83521 IG LIGHT CHAINS FREE EACH: CPT | Performed by: NURSE PRACTITIONER

## 2024-11-05 PROCEDURE — 36415 COLL VENOUS BLD VENIPUNCTURE: CPT | Performed by: NURSE PRACTITIONER

## 2024-11-05 PROCEDURE — 96401 CHEMO ANTI-NEOPL SQ/IM: CPT

## 2024-11-05 PROCEDURE — 84165 PROTEIN E-PHORESIS SERUM: CPT | Mod: 26 | Performed by: STUDENT IN AN ORGANIZED HEALTH CARE EDUCATION/TRAINING PROGRAM

## 2024-11-05 PROCEDURE — 82784 ASSAY IGA/IGD/IGG/IGM EACH: CPT | Performed by: NURSE PRACTITIONER

## 2024-11-05 PROCEDURE — 84155 ASSAY OF PROTEIN SERUM: CPT | Performed by: NURSE PRACTITIONER

## 2024-11-05 PROCEDURE — G0463 HOSPITAL OUTPT CLINIC VISIT: HCPCS | Mod: 25 | Performed by: NURSE PRACTITIONER

## 2024-11-05 RX ORDER — HEPARIN SODIUM,PORCINE 10 UNIT/ML
5 VIAL (ML) INTRAVENOUS
Status: CANCELLED | OUTPATIENT
Start: 2024-11-05

## 2024-11-05 RX ORDER — ACETAMINOPHEN 325 MG/1
650 TABLET ORAL
Status: CANCELLED | OUTPATIENT
Start: 2024-11-05

## 2024-11-05 RX ORDER — DEXAMETHASONE 4 MG/1
12 TABLET ORAL
Status: CANCELLED | OUTPATIENT
Start: 2024-11-05

## 2024-11-05 RX ORDER — DIPHENHYDRAMINE HCL 25 MG
50 CAPSULE ORAL
Status: CANCELLED | OUTPATIENT
Start: 2024-11-05

## 2024-11-05 RX ORDER — ALBUTEROL SULFATE 90 UG/1
1-2 INHALANT RESPIRATORY (INHALATION)
Status: CANCELLED
Start: 2024-11-05

## 2024-11-05 RX ORDER — EPINEPHRINE 1 MG/ML
0.3 INJECTION, SOLUTION INTRAMUSCULAR; SUBCUTANEOUS EVERY 5 MIN PRN
Status: CANCELLED | OUTPATIENT
Start: 2024-11-05

## 2024-11-05 RX ORDER — HEPARIN SODIUM (PORCINE) LOCK FLUSH IV SOLN 100 UNIT/ML 100 UNIT/ML
5 SOLUTION INTRAVENOUS
Status: CANCELLED | OUTPATIENT
Start: 2024-11-05

## 2024-11-05 RX ORDER — MEPERIDINE HYDROCHLORIDE 25 MG/ML
25 INJECTION INTRAMUSCULAR; INTRAVENOUS; SUBCUTANEOUS
Status: CANCELLED | OUTPATIENT
Start: 2024-11-05

## 2024-11-05 RX ORDER — ALBUTEROL SULFATE 0.83 MG/ML
2.5 SOLUTION RESPIRATORY (INHALATION)
Status: CANCELLED | OUTPATIENT
Start: 2024-11-05

## 2024-11-05 RX ORDER — LORAZEPAM 2 MG/ML
0.5 INJECTION INTRAMUSCULAR EVERY 4 HOURS PRN
Status: CANCELLED | OUTPATIENT
Start: 2024-11-05

## 2024-11-05 RX ORDER — DIPHENHYDRAMINE HYDROCHLORIDE 50 MG/ML
25 INJECTION INTRAMUSCULAR; INTRAVENOUS
Status: CANCELLED
Start: 2024-11-05

## 2024-11-05 RX ORDER — DIPHENHYDRAMINE HYDROCHLORIDE 50 MG/ML
50 INJECTION INTRAMUSCULAR; INTRAVENOUS
Status: CANCELLED
Start: 2024-11-05

## 2024-11-05 RX ORDER — METHYLPREDNISOLONE SODIUM SUCCINATE 40 MG/ML
40 INJECTION INTRAMUSCULAR; INTRAVENOUS
Status: CANCELLED
Start: 2024-11-05

## 2024-11-05 RX ADMIN — DARATUMUMAB AND HYALURONIDASE-FIHJ (HUMAN RECOMBINANT) 1800 MG: 1800; 30000 INJECTION SUBCUTANEOUS at 09:55

## 2024-11-05 ASSESSMENT — PAIN SCALES - GENERAL: PAINLEVEL_OUTOF10: NO PAIN (0)

## 2024-11-05 NOTE — PROGRESS NOTES
Infusion Nursing Note:  Quianaviri Dunaway presents today for C25D1: Darzalex Faspro.    Patient seen by provider today: Yes: Juaquin Hammond NP   present during visit today: Not Applicable.    Note: Patient arrives to infusion accompanied by her daughter in law.  Reports to feeling well with no new concerns since seeing Juaquin Hammond NP earlier today.      Intravenous Access:  No Intravenous access/labs at this visit.    Treatment Conditions:  Lab Results   Component Value Date    HGB 11.5 (L) 11/05/2024    WBC 5.3 11/05/2024    ANEU 2.3 11/04/2024    ANEUTAUTO 1.3 (L) 11/05/2024    PLT 99 (L) 11/05/2024        Lab Results   Component Value Date     11/04/2024    POTASSIUM 4.0 11/04/2024    MAG 2.4 (H) 05/26/2020    CR 0.83 11/04/2024    HUMBERTO 9.1 11/04/2024    BILITOTAL 0.4 11/04/2024    ALBUMIN 4.1 11/04/2024    ALT 7 11/04/2024    AST 10 11/04/2024       Results reviewed, labs MET treatment parameters, ok to proceed with treatment.      Post Infusion Assessment:  Patient tolerated one Darzalex Faspro injection to left abdomen without incident.  Blood return noted pre and post infusion.  Site patent and intact, free from redness, edema or discomfort.  No evidence of extravasations.  Access discontinued per protocol.       Discharge Plan:   Patient declined prescription refills.  Discharge instructions reviewed with: Patient and Family.  Patient and/or family verbalized understanding of discharge instructions and all questions answered.  AVS to patient via TESARO.  Patient will return 12/4/24 for labs, to see Gisselle DARDEN and C26D1: Darzalex Faspro for next appointment.   Patient discharged in stable condition accompanied by: daughter-in-law.  Departure Mode: Ambulatory with walker.      Michelle Quintana, RN

## 2024-11-05 NOTE — LETTER
11/5/2024      Quiana Dunaway  [address] [facility] [address]      Dear Colleague,    Thank you for referring your patient, Quiana Dunaway, to the [facility] CANCER [facility]. Please see a copy of my visit note below.    Oncology Rooming Note    November 5, 2024 9:01 AM   Quiana Dunaway is a 89 year old female who presents for:    Chief Complaint   Patient presents with     Oncology Clinic Visit     Initial Vitals: /61   Pulse 75   Temp 98  F (36.7  C) (Oral)   Resp 14   Wt 60.8 kg (134 lb)   LMP  (LMP Unknown)   SpO2 95%   BMI 26.17 kg/m   Estimated body mass index is 26.17 kg/m  as calculated from the following:    Height as of 10/21/24: 1.524 m (5').    Weight as of this encounter: 60.8 kg (134 lb). Body surface area is 1.6 meters squared.  No Pain (0) Comment: Data Unavailable   No LMP recorded (lmp unknown). Patient is postmenopausal.  Allergies reviewed: Yes  Medications reviewed: Yes    Medications: Medication refills not needed today.  Pharmacy name entered into [employer]:    [facility] - [place], [place] - [address]  [id number] - [place], [place] - [address] AT [facility] & [address]    Frailty Screening:   Is the patient here for a new oncology consult visit in cancer care? 2. No      Clinical concerns: no       Shari J. Schoenberger, CMA              Oncology/Hematology Visit Note  Nov 5, 2024    Reason for Visit: follow up of multiple myeloma IgG kappa high risk  Diagnosed 2017  Follows Dr. Parry  -08/14/2017 -04/30/2018: Velcade, Revlimid and dexamethasone Velcade stopped. Revlimid and dexamethasone continued.  12/30/2024: CT chest, abdomen, and pelvis does not reveal any malignancy.  -Revlimid held between 12/27/2021 and 03/08/2022.  -Daratumumab added on 11/14/2022.  03/13/2023- patient diagnosed with COVID her PCP prescribed molnupiravir.  Which she completed  -We held treatment  -Changed to  daratumumab, pomalidomide and dexamethasone on 06/12/2023.  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  Patient is  currently undergoing treatment with daratumumab pomalidomide and dexamethasone. Due to the side effects Pomalyst reduced to 2 mg 2 weeks on 1 week off  -6/4/2024: current treatment: Pomalyst 2 mg (14 days on, 1 week off), daratumumab monthly, and dexamethasone 4 mg weekly        Interval History:  Patient reports she is feeling well.  Denies fever chills sweats cough shortness of breath chest pain nausea vomiting diarrhea abdominal pain or bleeding      Review of Systems:  14 point ROS of systems including Constitutional, Eyes, Respiratory, Cardiovascular, Gastroenterology, Genitourinary, Integumentary, Muscularskeletal, Psychiatric were all negative except for pertinent positives noted in my HPI.    Physical Examination:  Physical Exam  HENT:      Head: Normocephalic.   Eyes:      Pupils: Pupils are equal, round, and reactive to light.   Cardiovascular:      Rate and Rhythm: Normal rate.   Pulmonary:      Effort: Pulmonary effort is normal.      Breath sounds: Normal breath sounds.   Abdominal:      General: Bowel sounds are normal.      Palpations: Abdomen is soft.   Musculoskeletal:         General: Normal range of motion.      Cervical back: Normal range of motion.      Right lower leg: No edema.   Neurological:      Mental Status: She is alert.         LABS  -CBC, CMP labs reviewed  MM labs pending        Assessment and Plan:    multiple myeloma IgG kappa high risk  Diagnosed 2017  Patient Dr. Parry  Patient is undergoing treatment with daratumumab, pomalidomide 2mg-( 2 weeks on 1 week off and 1 week off ) dose reduced due to side effects, dexamethasone 4 mg once a week  -labs reviewed; MM labs pending  -Labs reviewed ok to proceed with treatment  Patient is scheduled with Dr. Parry next month we will treat      Prophylaxis  Continue with aspirin and  acyclovir    Chronic mild neutropenia secondary to multiple myeloma and treatment  ANC 1.3  Patient is afebrile and asymptomatic  Neutropenic precautions.  Check temperature frequently in the event of fever chills sweats or any signs symptoms of infection call clinic and go to ER    Chronic anemia secondary to multiple myeloma and treatment  stable  Patient denies bleeding  -will continue to monitor    Chronic thrombocytopenia secondary to multiple myeloma and treatment  Stable  Patient denies bleeding bruising.  In the event of bleeding bruising fall injury or edema call clinic or go to    Bone health  Scheduled for multiple tooth extractions 7/8/2024  Continue to hold Zometa until dental work-up is done  Continue to hold Zometa    Generalized weakness  Chronic issue for the patient    Please call clinic with any changes in health condition or question    MARIUSZ Mckeon CNP        Chart documentation with Dragon Voice recognition Software. Although reviewed after completion, some words and grammatical errors may remain.      Again, thank you for allowing me to participate in the care of your patient.        Sincerely,        MARIUSZ Mckeon CNP

## 2024-11-05 NOTE — PROGRESS NOTES
Oncology Rooming Note    November 5, 2024 9:01 AM   Quaina Dunaway is a 89 year old female who presents for:    Chief Complaint   Patient presents with    Oncology Clinic Visit     Initial Vitals: /61   Pulse 75   Temp 98  F (36.7  C) (Oral)   Resp 14   Wt 60.8 kg (134 lb)   LMP  (LMP Unknown)   SpO2 95%   BMI 26.17 kg/m   Estimated body mass index is 26.17 kg/m  as calculated from the following:    Height as of 10/21/24: 1.524 m (5').    Weight as of this encounter: 60.8 kg (134 lb). Body surface area is 1.6 meters squared.  No Pain (0) Comment: Data Unavailable   No LMP recorded (lmp unknown). Patient is postmenopausal.  Allergies reviewed: Yes  Medications reviewed: Yes    Medications: Medication refills not needed today.  Pharmacy name entered into [facility]:    [facility] - [place], [place] - [address]  [id number] - [place], [place] - [address] AT [facility] & [address]    Frailty Screening:   Is the patient here for a new oncology consult visit in cancer care? 2. No      Clinical concerns: no       Shari J. Schoenberger, [facility]

## 2024-11-05 NOTE — PROGRESS NOTES
Oncology/Hematology Visit Note  Nov 5, 2024    Reason for Visit: follow up of multiple myeloma IgG kappa high risk  Diagnosed 2017  Follows Dr. Parry  -08/14/2017 -04/30/2018: Velcade, Revlimid and dexamethasone Velcade stopped. Revlimid and dexamethasone continued.  12/30/2024: CT chest, abdomen, and pelvis does not reveal any malignancy.  -Revlimid held between 12/27/2021 and 03/08/2022.  -Daratumumab added on 11/14/2022.  03/13/2023- patient diagnosed with COVID her PCP prescribed molnupiravir.  Which she completed  -We held treatment  -Changed to daratumumab, pomalidomide and dexamethasone on 06/12/2023.  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  Patient is  currently undergoing treatment with daratumumab pomalidomide and dexamethasone. Due to the side effects Pomalyst reduced to 2 mg 2 weeks on 1 week off  -6/4/2024: current treatment: Pomalyst 2 mg (14 days on, 1 week off), daratumumab monthly, and dexamethasone 4 mg weekly        Interval History:  Patient reports she is feeling well.  Denies fever chills sweats cough shortness of breath chest pain nausea vomiting diarrhea abdominal pain or bleeding      Review of Systems:  14 point ROS of systems including Constitutional, Eyes, Respiratory, Cardiovascular, Gastroenterology, Genitourinary, Integumentary, Muscularskeletal, Psychiatric were all negative except for pertinent positives noted in my HPI.    Physical Examination:  Physical Exam  HENT:      Head: Normocephalic.   Eyes:      Pupils: Pupils are equal, round, and reactive to light.   Cardiovascular:      Rate and Rhythm: Normal rate.   Pulmonary:      Effort: Pulmonary effort is normal.      Breath sounds: Normal breath sounds.   Abdominal:      General: Bowel sounds are normal.      Palpations: Abdomen is soft.   Musculoskeletal:         General: Normal range of motion.      Cervical back: Normal range of motion.      Right lower leg: No edema.   Neurological:       Mental Status: She is alert.         LABS  -CBC, CMP labs reviewed  MM labs pending        Assessment and Plan:    multiple myeloma IgG kappa high risk  Diagnosed 2017  Patient Dr. Parry  Patient is undergoing treatment with daratumumab, pomalidomide 2mg-( 2 weeks on 1 week off and 1 week off ) dose reduced due to side effects, dexamethasone 4 mg once a week  -labs reviewed; MM labs pending  -Labs reviewed ok to proceed with treatment  Patient is scheduled with Dr. Parry next month we will treat      Prophylaxis  Continue with aspirin and acyclovir    Chronic mild neutropenia secondary to multiple myeloma and treatment  ANC 1.3  Patient is afebrile and asymptomatic  Neutropenic precautions.  Check temperature frequently in the event of fever chills sweats or any signs symptoms of infection call clinic and go to ER    Chronic anemia secondary to multiple myeloma and treatment  stable  Patient denies bleeding  -will continue to monitor    Chronic thrombocytopenia secondary to multiple myeloma and treatment  Stable  Patient denies bleeding bruising.  In the event of bleeding bruising fall injury or edema call clinic or go to    Bone health  Scheduled for multiple tooth extractions 7/8/2024  Continue to hold Zometa until dental work-up is done  Continue to hold Zometa    Generalized weakness  Chronic issue for the patient    Addendum  Reviewed with Dr. Parry the spike and light chains and ratio  Per Dr. Parry recheck lab in 1 month      Please call clinic with any changes in health condition or question    MARIUSZ Mckeon CNP        Chart documentation with Dragon Voice recognition Software. Although reviewed after completion, some words and grammatical errors may remain.

## 2024-11-06 LAB
ALBUMIN SERPL ELPH-MCNC: 4.1 G/DL (ref 3.7–5.1)
ALPHA1 GLOB SERPL ELPH-MCNC: 0.3 G/DL (ref 0.2–0.4)
ALPHA2 GLOB SERPL ELPH-MCNC: 0.6 G/DL (ref 0.5–0.9)
B-GLOBULIN SERPL ELPH-MCNC: 0.6 G/DL (ref 0.6–1)
GAMMA GLOB SERPL ELPH-MCNC: 0.9 G/DL (ref 0.7–1.6)
IGG SERPL-MCNC: 1008 MG/DL (ref 610–1616)
KAPPA LC FREE SER-MCNC: 60.66 MG/DL (ref 0.33–1.94)
KAPPA LC FREE/LAMBDA FREE SER NEPH: 178.41 {RATIO} (ref 0.26–1.65)
LAMBDA LC FREE SERPL-MCNC: 0.34 MG/DL (ref 0.57–2.63)
LOCATION OF TASK: ABNORMAL
M PROTEIN SERPL ELPH-MCNC: 0.8 G/DL
PROT PATTERN SERPL ELPH-IMP: ABNORMAL

## 2024-11-15 DIAGNOSIS — C90.00 MULTIPLE MYELOMA NOT HAVING ACHIEVED REMISSION (H): Primary | ICD-10-CM

## 2024-11-25 ENCOUNTER — TELEPHONE (OUTPATIENT)
Dept: PHARMACY | Facility: CLINIC | Age: 89
End: 2024-11-25
Payer: MEDICARE

## 2024-11-26 NOTE — TELEPHONE ENCOUNTER
Oral Chemotherapy Monitoring Program   Medication: Pomalyst  Rx: 2mg PO daily on days 1 through 14 of 21 day cycle   Auth #:  [id number]  Risk Category: adult female of non child bearing potential  Routine survey questions reviewed.   Rx to be Escribed to [facility]     Terrie Bowen [facility]cology Pharmacy Liaison  [phone]

## 2024-12-04 ENCOUNTER — ONCOLOGY VISIT (OUTPATIENT)
Dept: ONCOLOGY | Facility: CLINIC | Age: 89
End: 2024-12-04
Attending: INTERNAL MEDICINE
Payer: MEDICARE

## 2024-12-04 ENCOUNTER — LAB (OUTPATIENT)
Dept: INFUSION THERAPY | Facility: CLINIC | Age: 89
End: 2024-12-04
Attending: INTERNAL MEDICINE
Payer: MEDICARE

## 2024-12-04 ENCOUNTER — ANCILLARY PROCEDURE (OUTPATIENT)
Dept: CARDIOLOGY | Facility: CLINIC | Age: 89
End: 2024-12-04
Attending: INTERNAL MEDICINE
Payer: MEDICARE

## 2024-12-04 VITALS
OXYGEN SATURATION: 97 % | SYSTOLIC BLOOD PRESSURE: 159 MMHG | BODY MASS INDEX: 26.33 KG/M2 | DIASTOLIC BLOOD PRESSURE: 82 MMHG | WEIGHT: 134.8 LBS | RESPIRATION RATE: 16 BRPM | TEMPERATURE: 98.3 F | HEART RATE: 81 BPM

## 2024-12-04 DIAGNOSIS — Z95.0 CARDIAC PACEMAKER IN SITU: ICD-10-CM

## 2024-12-04 DIAGNOSIS — C90.00 MULTIPLE MYELOMA NOT HAVING ACHIEVED REMISSION (H): Primary | ICD-10-CM

## 2024-12-04 DIAGNOSIS — I49.5 SICK SINUS SYNDROME (H): ICD-10-CM

## 2024-12-04 LAB
ALBUMIN SERPL BCG-MCNC: 3.9 G/DL (ref 3.5–5.2)
ALP SERPL-CCNC: 89 U/L (ref 40–150)
ALT SERPL W P-5'-P-CCNC: 7 U/L (ref 0–50)
ANION GAP SERPL CALCULATED.3IONS-SCNC: 9 MMOL/L (ref 7–15)
AST SERPL W P-5'-P-CCNC: 12 U/L (ref 0–45)
BASOPHILS # BLD AUTO: 0.1 10E3/UL (ref 0–0.2)
BASOPHILS NFR BLD AUTO: 1 %
BILIRUB SERPL-MCNC: 0.4 MG/DL
BUN SERPL-MCNC: 19.9 MG/DL (ref 8–23)
CALCIUM SERPL-MCNC: 9.3 MG/DL (ref 8.8–10.4)
CHLORIDE SERPL-SCNC: 108 MMOL/L (ref 98–107)
CREAT SERPL-MCNC: 0.64 MG/DL (ref 0.51–0.95)
EGFRCR SERPLBLD CKD-EPI 2021: 85 ML/MIN/1.73M2
ELLIPTOCYTES BLD QL SMEAR: ABNORMAL
EOSINOPHIL # BLD AUTO: 0.3 10E3/UL (ref 0–0.7)
EOSINOPHIL NFR BLD AUTO: 6 %
ERYTHROCYTE [DISTWIDTH] IN BLOOD BY AUTOMATED COUNT: 15.8 % (ref 10–15)
FRAGMENTS BLD QL SMEAR: ABNORMAL
GLUCOSE SERPL-MCNC: 82 MG/DL (ref 70–99)
HCO3 SERPL-SCNC: 26 MMOL/L (ref 22–29)
HCT VFR BLD AUTO: 33.4 % (ref 35–47)
HGB BLD-MCNC: 10.8 G/DL (ref 11.7–15.7)
IMM GRANULOCYTES # BLD: 0 10E3/UL
IMM GRANULOCYTES NFR BLD: 0 %
LYMPHOCYTES # BLD AUTO: 2 10E3/UL (ref 0.8–5.3)
LYMPHOCYTES NFR BLD AUTO: 37 %
MCH RBC QN AUTO: 29.3 PG (ref 26.5–33)
MCHC RBC AUTO-ENTMCNC: 32.3 G/DL (ref 31.5–36.5)
MCV RBC AUTO: 91 FL (ref 78–100)
MDC_IDC_EPISODE_DTM: NORMAL
MDC_IDC_EPISODE_DURATION: 1214 S
MDC_IDC_EPISODE_DURATION: 1270 S
MDC_IDC_EPISODE_DURATION: 130 S
MDC_IDC_EPISODE_DURATION: 156 S
MDC_IDC_EPISODE_DURATION: 1695 S
MDC_IDC_EPISODE_DURATION: 181 S
MDC_IDC_EPISODE_DURATION: 183 S
MDC_IDC_EPISODE_DURATION: 183 S
MDC_IDC_EPISODE_DURATION: 192 S
MDC_IDC_EPISODE_DURATION: 203 S
MDC_IDC_EPISODE_DURATION: 203 S
MDC_IDC_EPISODE_DURATION: 223 S
MDC_IDC_EPISODE_DURATION: 2240 S
MDC_IDC_EPISODE_DURATION: 229 S
MDC_IDC_EPISODE_DURATION: 262 S
MDC_IDC_EPISODE_DURATION: 265 S
MDC_IDC_EPISODE_DURATION: 271 S
MDC_IDC_EPISODE_DURATION: 273 S
MDC_IDC_EPISODE_DURATION: 3177 S
MDC_IDC_EPISODE_DURATION: 349 S
MDC_IDC_EPISODE_DURATION: 349 S
MDC_IDC_EPISODE_DURATION: 380 S
MDC_IDC_EPISODE_DURATION: 452 S
MDC_IDC_EPISODE_DURATION: 53 S
MDC_IDC_EPISODE_DURATION: 549 S
MDC_IDC_EPISODE_DURATION: 786 S
MDC_IDC_EPISODE_DURATION: 91 S
MDC_IDC_EPISODE_ID: 257
MDC_IDC_EPISODE_ID: 258
MDC_IDC_EPISODE_ID: 259
MDC_IDC_EPISODE_ID: 260
MDC_IDC_EPISODE_ID: 261
MDC_IDC_EPISODE_ID: 262
MDC_IDC_EPISODE_ID: 263
MDC_IDC_EPISODE_ID: 264
MDC_IDC_EPISODE_ID: 265
MDC_IDC_EPISODE_ID: 266
MDC_IDC_EPISODE_ID: 267
MDC_IDC_EPISODE_ID: 268
MDC_IDC_EPISODE_ID: 269
MDC_IDC_EPISODE_ID: 270
MDC_IDC_EPISODE_ID: 271
MDC_IDC_EPISODE_ID: 272
MDC_IDC_EPISODE_ID: 273
MDC_IDC_EPISODE_ID: 274
MDC_IDC_EPISODE_ID: 275
MDC_IDC_EPISODE_ID: 276
MDC_IDC_EPISODE_ID: 277
MDC_IDC_EPISODE_ID: 278
MDC_IDC_EPISODE_ID: 279
MDC_IDC_EPISODE_ID: 280
MDC_IDC_EPISODE_ID: 281
MDC_IDC_EPISODE_ID: 282
MDC_IDC_EPISODE_ID: 283
MDC_IDC_EPISODE_TYPE: NORMAL
MDC_IDC_LEAD_CONNECTION_STATUS: NORMAL
MDC_IDC_LEAD_CONNECTION_STATUS: NORMAL
MDC_IDC_LEAD_IMPLANT_DT: NORMAL
MDC_IDC_LEAD_IMPLANT_DT: NORMAL
MDC_IDC_LEAD_LOCATION: NORMAL
MDC_IDC_LEAD_LOCATION: NORMAL
MDC_IDC_LEAD_LOCATION_DETAIL_1: NORMAL
MDC_IDC_LEAD_LOCATION_DETAIL_1: NORMAL
MDC_IDC_LEAD_MFG: NORMAL
MDC_IDC_LEAD_MFG: NORMAL
MDC_IDC_LEAD_MODEL: NORMAL
MDC_IDC_LEAD_MODEL: NORMAL
MDC_IDC_LEAD_POLARITY_TYPE: NORMAL
MDC_IDC_LEAD_POLARITY_TYPE: NORMAL
MDC_IDC_LEAD_SERIAL: NORMAL
MDC_IDC_LEAD_SERIAL: NORMAL
MDC_IDC_MSMT_BATTERY_DTM: NORMAL
MDC_IDC_MSMT_BATTERY_REMAINING_LONGEVITY: 8 MO
MDC_IDC_MSMT_BATTERY_RRT_TRIGGER: 2.83
MDC_IDC_MSMT_BATTERY_STATUS: NORMAL
MDC_IDC_MSMT_BATTERY_VOLTAGE: 2.87 V
MDC_IDC_MSMT_LEADCHNL_RA_IMPEDANCE_VALUE: 342 OHM
MDC_IDC_MSMT_LEADCHNL_RA_IMPEDANCE_VALUE: 342 OHM
MDC_IDC_MSMT_LEADCHNL_RA_PACING_THRESHOLD_AMPLITUDE: 0.5 V
MDC_IDC_MSMT_LEADCHNL_RA_PACING_THRESHOLD_PULSEWIDTH: 0.4 MS
MDC_IDC_MSMT_LEADCHNL_RA_SENSING_INTR_AMPL: 1.75 MV
MDC_IDC_MSMT_LEADCHNL_RA_SENSING_INTR_AMPL: 1.75 MV
MDC_IDC_MSMT_LEADCHNL_RV_IMPEDANCE_VALUE: 399 OHM
MDC_IDC_MSMT_LEADCHNL_RV_IMPEDANCE_VALUE: 494 OHM
MDC_IDC_MSMT_LEADCHNL_RV_PACING_THRESHOLD_AMPLITUDE: 1.12 V
MDC_IDC_MSMT_LEADCHNL_RV_PACING_THRESHOLD_PULSEWIDTH: 0.4 MS
MDC_IDC_MSMT_LEADCHNL_RV_SENSING_INTR_AMPL: 8.12 MV
MDC_IDC_MSMT_LEADCHNL_RV_SENSING_INTR_AMPL: 8.12 MV
MDC_IDC_PG_IMPLANT_DTM: NORMAL
MDC_IDC_PG_MFG: NORMAL
MDC_IDC_PG_MODEL: NORMAL
MDC_IDC_PG_SERIAL: NORMAL
MDC_IDC_PG_TYPE: NORMAL
MDC_IDC_SESS_CLINIC_NAME: NORMAL
MDC_IDC_SESS_DTM: NORMAL
MDC_IDC_SESS_TYPE: NORMAL
MDC_IDC_SET_BRADY_AT_MODE_SWITCH_RATE: 171 {BEATS}/MIN
MDC_IDC_SET_BRADY_HYSTRATE: NORMAL
MDC_IDC_SET_BRADY_LOWRATE: 60 {BEATS}/MIN
MDC_IDC_SET_BRADY_MAX_SENSOR_RATE: 120 {BEATS}/MIN
MDC_IDC_SET_BRADY_MAX_TRACKING_RATE: 120 {BEATS}/MIN
MDC_IDC_SET_BRADY_MODE: NORMAL
MDC_IDC_SET_BRADY_PAV_DELAY_LOW: 200 MS
MDC_IDC_SET_BRADY_SAV_DELAY_LOW: 180 MS
MDC_IDC_SET_LEADCHNL_RA_PACING_AMPLITUDE: 1.5 V
MDC_IDC_SET_LEADCHNL_RA_PACING_ANODE_ELECTRODE_1: NORMAL
MDC_IDC_SET_LEADCHNL_RA_PACING_ANODE_LOCATION_1: NORMAL
MDC_IDC_SET_LEADCHNL_RA_PACING_CAPTURE_MODE: NORMAL
MDC_IDC_SET_LEADCHNL_RA_PACING_CATHODE_ELECTRODE_1: NORMAL
MDC_IDC_SET_LEADCHNL_RA_PACING_CATHODE_LOCATION_1: NORMAL
MDC_IDC_SET_LEADCHNL_RA_PACING_POLARITY: NORMAL
MDC_IDC_SET_LEADCHNL_RA_PACING_PULSEWIDTH: 0.4 MS
MDC_IDC_SET_LEADCHNL_RA_SENSING_ANODE_ELECTRODE_1: NORMAL
MDC_IDC_SET_LEADCHNL_RA_SENSING_ANODE_LOCATION_1: NORMAL
MDC_IDC_SET_LEADCHNL_RA_SENSING_CATHODE_ELECTRODE_1: NORMAL
MDC_IDC_SET_LEADCHNL_RA_SENSING_CATHODE_LOCATION_1: NORMAL
MDC_IDC_SET_LEADCHNL_RA_SENSING_POLARITY: NORMAL
MDC_IDC_SET_LEADCHNL_RA_SENSING_SENSITIVITY: 0.3 MV
MDC_IDC_SET_LEADCHNL_RV_PACING_AMPLITUDE: 2.75 V
MDC_IDC_SET_LEADCHNL_RV_PACING_ANODE_ELECTRODE_1: NORMAL
MDC_IDC_SET_LEADCHNL_RV_PACING_ANODE_LOCATION_1: NORMAL
MDC_IDC_SET_LEADCHNL_RV_PACING_CAPTURE_MODE: NORMAL
MDC_IDC_SET_LEADCHNL_RV_PACING_CATHODE_ELECTRODE_1: NORMAL
MDC_IDC_SET_LEADCHNL_RV_PACING_CATHODE_LOCATION_1: NORMAL
MDC_IDC_SET_LEADCHNL_RV_PACING_POLARITY: NORMAL
MDC_IDC_SET_LEADCHNL_RV_PACING_PULSEWIDTH: 0.4 MS
MDC_IDC_SET_LEADCHNL_RV_SENSING_ANODE_ELECTRODE_1: NORMAL
MDC_IDC_SET_LEADCHNL_RV_SENSING_ANODE_LOCATION_1: NORMAL
MDC_IDC_SET_LEADCHNL_RV_SENSING_CATHODE_ELECTRODE_1: NORMAL
MDC_IDC_SET_LEADCHNL_RV_SENSING_CATHODE_LOCATION_1: NORMAL
MDC_IDC_SET_LEADCHNL_RV_SENSING_POLARITY: NORMAL
MDC_IDC_SET_LEADCHNL_RV_SENSING_SENSITIVITY: 0.9 MV
MDC_IDC_SET_ZONE_DETECTION_INTERVAL: 350 MS
MDC_IDC_SET_ZONE_DETECTION_INTERVAL: 400 MS
MDC_IDC_SET_ZONE_STATUS: NORMAL
MDC_IDC_SET_ZONE_STATUS: NORMAL
MDC_IDC_SET_ZONE_TYPE: NORMAL
MDC_IDC_SET_ZONE_VENDOR_TYPE: NORMAL
MDC_IDC_STAT_AT_BURDEN_PERCENT: 0.5 %
MDC_IDC_STAT_AT_DTM_END: NORMAL
MDC_IDC_STAT_AT_DTM_START: NORMAL
MDC_IDC_STAT_BRADY_AP_VP_PERCENT: 91.64 %
MDC_IDC_STAT_BRADY_AP_VS_PERCENT: 0.02 %
MDC_IDC_STAT_BRADY_AS_VP_PERCENT: 8.33 %
MDC_IDC_STAT_BRADY_AS_VS_PERCENT: 0.01 %
MDC_IDC_STAT_BRADY_DTM_END: NORMAL
MDC_IDC_STAT_BRADY_DTM_START: NORMAL
MDC_IDC_STAT_BRADY_RA_PERCENT_PACED: 91.47 %
MDC_IDC_STAT_BRADY_RV_PERCENT_PACED: 99.91 %
MDC_IDC_STAT_EPISODE_RECENT_COUNT: 0
MDC_IDC_STAT_EPISODE_RECENT_COUNT: 27
MDC_IDC_STAT_EPISODE_RECENT_COUNT_DTM_END: NORMAL
MDC_IDC_STAT_EPISODE_RECENT_COUNT_DTM_START: NORMAL
MDC_IDC_STAT_EPISODE_TOTAL_COUNT: 0
MDC_IDC_STAT_EPISODE_TOTAL_COUNT: 0
MDC_IDC_STAT_EPISODE_TOTAL_COUNT: 14
MDC_IDC_STAT_EPISODE_TOTAL_COUNT: 266
MDC_IDC_STAT_EPISODE_TOTAL_COUNT_DTM_END: NORMAL
MDC_IDC_STAT_EPISODE_TOTAL_COUNT_DTM_START: NORMAL
MDC_IDC_STAT_EPISODE_TYPE: NORMAL
MDC_IDC_STAT_TACHYTHERAPY_RECENT_DTM_END: NORMAL
MDC_IDC_STAT_TACHYTHERAPY_RECENT_DTM_START: NORMAL
MDC_IDC_STAT_TACHYTHERAPY_TOTAL_DTM_END: NORMAL
MDC_IDC_STAT_TACHYTHERAPY_TOTAL_DTM_START: NORMAL
MONOCYTES # BLD AUTO: 1.7 10E3/UL (ref 0–1.3)
MONOCYTES NFR BLD AUTO: 31 %
NEUTROPHILS # BLD AUTO: 1.3 10E3/UL (ref 1.6–8.3)
NEUTROPHILS NFR BLD AUTO: 25 %
NRBC # BLD AUTO: 0 10E3/UL
NRBC BLD AUTO-RTO: 0 /100
PLAT MORPH BLD: ABNORMAL
PLATELET # BLD AUTO: 167 10E3/UL (ref 150–450)
POTASSIUM SERPL-SCNC: 4.2 MMOL/L (ref 3.4–5.3)
PROT SERPL-MCNC: 6.6 G/DL (ref 6.4–8.3)
RBC # BLD AUTO: 3.68 10E6/UL (ref 3.8–5.2)
RBC MORPH BLD: ABNORMAL
SODIUM SERPL-SCNC: 143 MMOL/L (ref 135–145)
TOTAL PROTEIN SERUM FOR ELP: 6.2 G/DL (ref 6.4–8.3)
WBC # BLD AUTO: 5.4 10E3/UL (ref 4–11)

## 2024-12-04 PROCEDURE — 96401 CHEMO ANTI-NEOPL SQ/IM: CPT

## 2024-12-04 PROCEDURE — 83521 IG LIGHT CHAINS FREE EACH: CPT | Performed by: INTERNAL MEDICINE

## 2024-12-04 PROCEDURE — 85004 AUTOMATED DIFF WBC COUNT: CPT | Performed by: INTERNAL MEDICINE

## 2024-12-04 PROCEDURE — 250N000011 HC RX IP 250 OP 636: Mod: JZ | Performed by: INTERNAL MEDICINE

## 2024-12-04 PROCEDURE — 82784 ASSAY IGA/IGD/IGG/IGM EACH: CPT | Performed by: INTERNAL MEDICINE

## 2024-12-04 PROCEDURE — 36415 COLL VENOUS BLD VENIPUNCTURE: CPT | Performed by: INTERNAL MEDICINE

## 2024-12-04 PROCEDURE — 84165 PROTEIN E-PHORESIS SERUM: CPT | Mod: 26 | Performed by: STUDENT IN AN ORGANIZED HEALTH CARE EDUCATION/TRAINING PROGRAM

## 2024-12-04 PROCEDURE — 82247 BILIRUBIN TOTAL: CPT | Performed by: INTERNAL MEDICINE

## 2024-12-04 PROCEDURE — 84295 ASSAY OF SERUM SODIUM: CPT | Performed by: INTERNAL MEDICINE

## 2024-12-04 PROCEDURE — 84155 ASSAY OF PROTEIN SERUM: CPT | Performed by: INTERNAL MEDICINE

## 2024-12-04 PROCEDURE — G0463 HOSPITAL OUTPT CLINIC VISIT: HCPCS | Mod: 25 | Performed by: INTERNAL MEDICINE

## 2024-12-04 PROCEDURE — 84165 PROTEIN E-PHORESIS SERUM: CPT | Mod: TC | Performed by: STUDENT IN AN ORGANIZED HEALTH CARE EDUCATION/TRAINING PROGRAM

## 2024-12-04 PROCEDURE — 99215 OFFICE O/P EST HI 40 MIN: CPT | Performed by: INTERNAL MEDICINE

## 2024-12-04 PROCEDURE — 80053 COMPREHEN METABOLIC PANEL: CPT | Performed by: INTERNAL MEDICINE

## 2024-12-04 PROCEDURE — 85048 AUTOMATED LEUKOCYTE COUNT: CPT | Performed by: INTERNAL MEDICINE

## 2024-12-04 PROCEDURE — G2211 COMPLEX E/M VISIT ADD ON: HCPCS | Performed by: INTERNAL MEDICINE

## 2024-12-04 RX ORDER — DEXAMETHASONE 4 MG/1
12 TABLET ORAL
OUTPATIENT
Start: 2024-12-31

## 2024-12-04 RX ORDER — HEPARIN SODIUM (PORCINE) LOCK FLUSH IV SOLN 100 UNIT/ML 100 UNIT/ML
5 SOLUTION INTRAVENOUS
Status: CANCELLED | OUTPATIENT
Start: 2024-12-04

## 2024-12-04 RX ORDER — LORAZEPAM 0.5 MG/1
0.5 TABLET ORAL EVERY 6 HOURS PRN
Qty: 30 TABLET | Refills: 3 | Status: SHIPPED | OUTPATIENT
Start: 2024-12-04

## 2024-12-04 RX ORDER — LORAZEPAM 2 MG/ML
0.5 INJECTION INTRAMUSCULAR EVERY 4 HOURS PRN
OUTPATIENT
Start: 2024-12-31

## 2024-12-04 RX ORDER — LORAZEPAM 2 MG/ML
0.5 INJECTION INTRAMUSCULAR EVERY 4 HOURS PRN
Status: CANCELLED | OUTPATIENT
Start: 2024-12-04

## 2024-12-04 RX ORDER — ALBUTEROL SULFATE 0.83 MG/ML
2.5 SOLUTION RESPIRATORY (INHALATION)
OUTPATIENT
Start: 2024-12-31

## 2024-12-04 RX ORDER — DIPHENHYDRAMINE HCL 25 MG
50 CAPSULE ORAL
Status: CANCELLED | OUTPATIENT
Start: 2024-12-04

## 2024-12-04 RX ORDER — HEPARIN SODIUM,PORCINE 10 UNIT/ML
5 VIAL (ML) INTRAVENOUS
OUTPATIENT
Start: 2024-12-31

## 2024-12-04 RX ORDER — HEPARIN SODIUM,PORCINE 10 UNIT/ML
5 VIAL (ML) INTRAVENOUS
Status: CANCELLED | OUTPATIENT
Start: 2024-12-04

## 2024-12-04 RX ORDER — DIPHENHYDRAMINE HYDROCHLORIDE 50 MG/ML
50 INJECTION INTRAMUSCULAR; INTRAVENOUS
Start: 2024-12-31

## 2024-12-04 RX ORDER — EPINEPHRINE 1 MG/ML
0.3 INJECTION, SOLUTION INTRAMUSCULAR; SUBCUTANEOUS EVERY 5 MIN PRN
OUTPATIENT
Start: 2024-12-31

## 2024-12-04 RX ORDER — DIPHENHYDRAMINE HYDROCHLORIDE 50 MG/ML
25 INJECTION INTRAMUSCULAR; INTRAVENOUS
Start: 2024-12-31

## 2024-12-04 RX ORDER — ALBUTEROL SULFATE 90 UG/1
1-2 INHALANT RESPIRATORY (INHALATION)
Status: CANCELLED
Start: 2024-12-04

## 2024-12-04 RX ORDER — METHYLPREDNISOLONE SODIUM SUCCINATE 40 MG/ML
40 INJECTION INTRAMUSCULAR; INTRAVENOUS
Start: 2024-12-31

## 2024-12-04 RX ORDER — ACETAMINOPHEN 325 MG/1
650 TABLET ORAL
Status: CANCELLED | OUTPATIENT
Start: 2024-12-04

## 2024-12-04 RX ORDER — MEPERIDINE HYDROCHLORIDE 25 MG/ML
25 INJECTION INTRAMUSCULAR; INTRAVENOUS; SUBCUTANEOUS
OUTPATIENT
Start: 2024-12-31

## 2024-12-04 RX ORDER — EPINEPHRINE 1 MG/ML
0.3 INJECTION, SOLUTION INTRAMUSCULAR; SUBCUTANEOUS EVERY 5 MIN PRN
Status: CANCELLED | OUTPATIENT
Start: 2024-12-04

## 2024-12-04 RX ORDER — HEPARIN SODIUM (PORCINE) LOCK FLUSH IV SOLN 100 UNIT/ML 100 UNIT/ML
5 SOLUTION INTRAVENOUS
OUTPATIENT
Start: 2024-12-31

## 2024-12-04 RX ORDER — ACETAMINOPHEN 325 MG/1
650 TABLET ORAL
OUTPATIENT
Start: 2024-12-31

## 2024-12-04 RX ORDER — MEPERIDINE HYDROCHLORIDE 25 MG/ML
25 INJECTION INTRAMUSCULAR; INTRAVENOUS; SUBCUTANEOUS
Status: CANCELLED | OUTPATIENT
Start: 2024-12-04

## 2024-12-04 RX ORDER — ALBUTEROL SULFATE 0.83 MG/ML
2.5 SOLUTION RESPIRATORY (INHALATION)
Status: CANCELLED | OUTPATIENT
Start: 2024-12-04

## 2024-12-04 RX ORDER — DIPHENHYDRAMINE HYDROCHLORIDE 50 MG/ML
50 INJECTION INTRAMUSCULAR; INTRAVENOUS
Status: CANCELLED
Start: 2024-12-04

## 2024-12-04 RX ORDER — DIPHENHYDRAMINE HCL 25 MG
50 CAPSULE ORAL
OUTPATIENT
Start: 2024-12-31

## 2024-12-04 RX ORDER — DIPHENHYDRAMINE HYDROCHLORIDE 50 MG/ML
25 INJECTION INTRAMUSCULAR; INTRAVENOUS
Status: CANCELLED
Start: 2024-12-04

## 2024-12-04 RX ORDER — METHYLPREDNISOLONE SODIUM SUCCINATE 40 MG/ML
40 INJECTION INTRAMUSCULAR; INTRAVENOUS
Status: CANCELLED
Start: 2024-12-04

## 2024-12-04 RX ORDER — DEXAMETHASONE 4 MG/1
12 TABLET ORAL
Status: CANCELLED | OUTPATIENT
Start: 2024-12-04

## 2024-12-04 RX ORDER — ALBUTEROL SULFATE 90 UG/1
1-2 INHALANT RESPIRATORY (INHALATION)
Start: 2024-12-31

## 2024-12-04 RX ADMIN — DARATUMUMAB AND HYALURONIDASE-FIHJ (HUMAN RECOMBINANT) 1800 MG: 1800; 30000 INJECTION SUBCUTANEOUS at 10:48

## 2024-12-04 ASSESSMENT — PAIN SCALES - GENERAL: PAINLEVEL_OUTOF10: NO PAIN (0)

## 2024-12-04 NOTE — NURSING NOTE
Oncology Rooming Note    December 4, 2024 9:44 AM   Quiana Dunaway is a 89 year old female who presents for:    Chief Complaint   Patient presents with    Oncology Clinic Visit     Initial Vitals: BP (!) 159/82   Pulse 81   Temp 98.3  F (36.8  C) (Oral)   Resp 16   Wt 61.1 kg (134 lb 12.8 oz)   LMP  (LMP Unknown)   SpO2 97%   BMI 26.33 kg/m   Estimated body mass index is 26.33 kg/m  as calculated from the following:    Height as of 10/21/24: 1.524 m (5').    Weight as of this encounter: 61.1 kg (134 lb 12.8 oz). Body surface area is 1.61 meters squared.  No Pain (0) Comment: Data Unavailable   No LMP recorded (lmp unknown). Patient is postmenopausal.  Allergies reviewed: Yes  Medications reviewed: Yes    Medications: Medication refills not needed today.  Pharmacy name entered into Somewhere:    [facility] - [place], [place] - [address]  [id number] - [place], [place] - [address] AT [facility]    Frailty Screening:   Is the patient here for a new oncology consult visit in cancer care? 2. No      Clinical concerns: follow up        Florencia Alicia

## 2024-12-04 NOTE — PROGRESS NOTES
Infusion Nursing Note:  Quiana Dunaway presents today for darzalex faspro.    Patient seen by provider today: Yes: Sohan   present during visit today: Not Applicable.    Note: N/A.      Intravenous Access:  No Intravenous access/labs at this visit.    Treatment Conditions:  Lab Results   Component Value Date    HGB 10.8 (L) 12/04/2024    WBC 5.4 12/04/2024    ANEU 2.3 11/04/2024    ANEUTAUTO 1.3 (L) 12/04/2024     12/04/2024        Lab Results   Component Value Date     12/04/2024    POTASSIUM 4.2 12/04/2024    MAG 2.4 (H) 05/26/2020    CR 0.64 12/04/2024    HUMBERTO 9.3 12/04/2024    BILITOTAL 0.4 12/04/2024    ALBUMIN 3.9 12/04/2024    ALT 7 12/04/2024    AST 12 12/04/2024       Results reviewed, labs MET treatment parameters, ok to proceed with treatment.      Post Infusion Assessment:  Patient tolerated injection without incident.       Discharge Plan:   Discharge instructions reviewed with: Patient and Family.  Patient and/or family verbalized understanding of discharge instructions and all questions answered.  AVS to patient via [employer].  Patient will return in 4 weeks for next appointment.   Patient discharged in stable condition accompanied by: self and daughter.  Departure Mode: Ambulatory.      Jessica Shetty RN

## 2024-12-04 NOTE — Clinical Note
12/4/2024      Quiana Dunaway  [address] [facility] [address]      Dear Colleague,    Thank you for referring your patient, Quiana Dunaway, to the [facility]. Please see a copy of my visit note below.    HEMATOLOGY HISTORY: Mrs. Dunaway is a lady with multiple myeloma (IgG kappa). High risk, t(14;16).  1. On 05/01/2017, WBC of 3.4, hemoglobin of 10.2 and platelet of 154.  2. SPEP on 07/07/2017 revealed M-spike of 1.8.  3. Bone marrow biopsy on 07/12/2017 reveals kappa monotypic plasma cell population consistent with multiple myeloma.  Bone marrow is 70% cellular.  Plasma cell is 50-60%.     -There is gain of chromosome 1, 5, 9, 15, and 17.  There is translocation 14;16.   4.  On 07/18/2017:  -M-spike of 1.9.   -Immunofixation reveals monoclonal IgG kappa.    -IgG level of 2970.  IgA of 15 and IgM of 175.    -Kappa free light chain of 67.7.  Lambda free light chain of 1.42.  Ratio of kappa to lambda of 47.71.    -Beta 2 microglobulin of 3.4.   5. PET scan on 07/24/2017 reveals several focal areas of increased FDG uptake consistent with multiple myeloma.  There is probable epithelial cyst in tail of the pancreas.  No associated abnormal FDG activity.  Left thyroid cysts or nodules without any FDG activity.  There is a 0.3 cm left upper lobe lung nodule.   6.  Velcade, Revlimid and dexamethasone between on 08/14/2017 and 04/30/2018. Velcade stopped.   -Revlimid and dexamethasone continued.  -Revlimid held between 12/27/2021 and 03/08/2022.  -Daratumumab added on 11/14/2022.  -Changed to daratumumab, pomalidomide and dexamethasone on 06/12/2023.  7. CT chest, abdomen, and pelvis on 12/30/2021 does not reveal any malignancy.  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     SUBJECTIVE:    Ms. Dunaway is an 89-year-old female with multiple myeloma on daratumumab, pomalidomide and dexamethasone.  She takesn pomalidomide 2 mg a day for 2 weeks on and 1  week off.  Dose reduction due to side effects.  She takes dexamethasone 4 mg weekly.  She is tolerating treatment well.       Patient has dental problem.  She has been advised dental extraction.  Because of that we are holding Zometa.     Her overall condition is stable.  She has mild generalized weakness secondary to myeloma, old age and medications.  No worsening of weakness.  She can do activities of daily living. She still works. She has arthritic joint pain. No worsening of pain.  She uses walker.     She has chronic dizziness.  No worsening of dizziness.  No recent falls.  No headache.  No chest pain.  No shortness of breath.  No abdominal pain. No nausea. No vomiting.  No urinary complaint.  She gets intermittent diarrhea.  No bleeding.  No fever or night sweats.  All other review of system is negative.      PHYSICAL EXAMINATION:   GENERAL:  Alert and oriented x 3.  Not in distress.  VITAL SIGNS:  Reviewed.       Rest of the system is not examined.     LABORATORY: CBC and CMP reviewed.      ASSESSMENT:    1.  An 89-year-old female with multiple myeloma on daratumumab, pomalidomide and dexamethasone.  Myeloma is overall stable.  2.  Mild normocytic anemia from myeloma and its treatment. Anemia is stable.  3.  Thrombocytopenia. Stable.  4.  Chronic dizziness.  Stable.  5.  Generalized weakness from myeloma, anemia, medication and old age.  Stable.  6.  Arthritic joint pain.  7.  Dental problem.     PLAN:  Continue pomalidomid, daratumumab and dexamethasone. (Pomalidomide 2 mg a day for 2 weeks on and 1 week off.  Dexamethasone 4 mg weekly)  Continue to hold zometa.  See NP in 4 weeks.  See me in 8 weeks.  Continue prophylactic aspirin and acyclovir.     DISCUSSION:  1.  Patient's overall condition is stable.  She has mild generalized weakness.  No worsening.  She is able to do all her activities.  She still works part-time.     Myeloma labs done last month were reviewed.  Monoclonal peak is better at 0.7.  IgG  level is normal.  Kappa free light chain went up to 55 from 45.  Explained to the patient that overall I will consider myeloma to be stable.     2.  Discussed regarding treatment.  She will continue on daratumumab, pomalidomide and dexamethasone.  She is on reduced dose of pomalidomide due to side effects.  No need to increase pomalidomide dose at this time.     Explained to the patient that there are other treatment options when there is progression.  We can give her carfilzomib based therapy.  Other option is also CAR-T therapy.     3.  Patient has dental problem.  She has been recommended complete dental extraction.  She has been thinking about it.  Because of dental problem, will continue to hold Zometa.     4.  She will continue on prophylactic aspirin and acyclovir.     5.  She had a few questions which were all answered.  I will see her in 2 months.  In between she will see our ROSA MARIA.  Advised her to call us with any questions or concerns.     TOTAL VISIT TIME: 40 minutes.  Time spent in today's visit, review of chart/investigations today, monitoring for toxicity of high risk medications and documentation today.          Again, thank you for allowing me to participate in the care of your patient.        Sincerely,        Alex Parry MD

## 2024-12-04 NOTE — PATIENT INSTRUCTIONS
Continue pomalidomid, daratumumab and dexamethasone. (Pomalidomide 2 mg a day for 2 weeks on and 2 week off.  Dexamethasone 4 mg weekly)  Hold zometa.  See NP in 4 weeks.  See me in 8 weeks.  Continue prophylactic aspirin and acyclovir.

## 2024-12-04 NOTE — PROGRESS NOTES
Oral Chemotherapy Monitoring Program  Lab Follow Up    Reviewed lab results from 12/4/24. Patient confirms starting new cycle on Monday 12/2/24.         8/2/2024     8:00 AM 8/5/2024     8:00 AM 8/22/2024     3:00 PM 9/16/2024    11:00 AM 10/3/2024     1:00 PM 10/25/2024    11:00 AM 12/4/2024    10:00 AM   ORAL CHEMOTHERAPY   Assessment Type Chart Review;Refill Lab Monitoring Refill Refill Refill Refill Lab Monitoring   Diagnosis Code Multiple Myeloma Multiple Myeloma Multiple Myeloma Multiple Myeloma Multiple Myeloma Multiple Myeloma Multiple Myeloma   Providers Dr. Sohan Parry   Clinic Name/Location [facility]   Drug Name Pomalyst (pomalidomide) Pomalyst (pomalidomide) Pomalyst (pomalidomide) Pomalyst (pomalidomide) Pomalyst (pomalidomide) Pomalyst (pomalidomide) Pomalyst (pomalidomide)   Dose 2 mg 2 mg 2 mg 2 mg 2 mg 2 mg 2 mg   Current Schedule Daily Daily Daily Daily Daily Daily Daily   Cycle Details 2 weeks on, 1 week off 2 weeks on, 1 week off 2 weeks on, 1 week off 2 weeks on, 1 week off 2 weeks on, 1 week off 2 weeks on, 1 week off 2 weeks on, 2 weeks off   Start Date of Last Cycle       12/2/2024   Planned next cycle start date 8/11/2024 8/11/2024 9/1/2024 12/30/2024   Adverse Effects  Anemia;Thrombocytopenia Anemia;Thrombocytopenia       Anemia  Grade 1 Grade 1       Pharmacist Intervention(anemia)  No No       Thrombocytopenia  Grade 1 Grade 1       Pharmacist Intervention(thrombocytopenia)  No No       Any new drug interactions? No  No No      Is the dose as ordered appropriate for the patient? Yes Yes Yes Yes      Has the patient missed any days of school, work, or other routine activity?   No       Since the last time we talked, have you been hospitalized or used the emergency room?   No           Labs:  _  Result Component Current Result Ref Range   Sodium 143 (12/4/2024) 135 - 145  mmol/L     _  Result Component Current Result Ref Range   Potassium 4.2 (12/4/2024) 3.4 - 5.3 mmol/L     _  Result Component Current Result Ref Range   Calcium 9.3 (12/4/2024) 8.8 - 10.4 mg/dL     No results found for Mag within last 30 days.     No results found for Phos within last 30 days.     _  Result Component Current Result Ref Range   Albumin 3.9 (12/4/2024) 3.5 - 5.2 g/dL     _  Result Component Current Result Ref Range   Urea Nitrogen 19.9 (12/4/2024) 8.0 - 23.0 mg/dL     _  Result Component Current Result Ref Range   Creatinine 0.64 (12/4/2024) 0.51 - 0.95 mg/dL     _  Result Component Current Result Ref Range   AST 12 (12/4/2024) 0 - 45 U/L     _  Result Component Current Result Ref Range   ALT 7 (12/4/2024) 0 - 50 U/L     _  Result Component Current Result Ref Range   Bilirubin Total 0.4 (12/4/2024) <=1.2 mg/dL     _  Result Component Current Result Ref Range   WBC Count 5.4 (12/4/2024) 4.0 - 11.0 10e3/uL     _  Result Component Current Result Ref Range   Hemoglobin 10.8 (L) (12/4/2024) 11.7 - 15.7 g/dL     _  Result Component Current Result Ref Range   Platelet Count 167 (12/4/2024) 150 - 450 10e3/uL     _  Result Component Current Result Ref Range   Absolute Neutrophils 2.3 (11/4/2024) 1.6 - 8.3 10e3/uL     _  Result Component Current Result Ref Range   Absolute Neutrophils 1.3 (L) (12/4/2024) 1.6 - 8.3 10e3/uL        Assessment & Plan:  No concerning abnormalities. Proceed with cycle as planned, however, changing to 2 weeks on 2 weeks off. Patient will be due to start next cycle on 12/30. Reviewed plan with Chelsey.    Questions answered to patient's satisfaction.    Follow-Up:  4 weeks with labs.     Debra BonillaD  December 4, 2024

## 2024-12-04 NOTE — PROGRESS NOTES
HEMATOLOGY HISTORY: Mrs. Dunaway is a lady with multiple myeloma (IgG kappa). High risk, t(14;16).  1. On 05/01/2017, WBC of 3.4, hemoglobin of 10.2 and platelet of 154.  2. SPEP on 07/07/2017 revealed M-spike of 1.8.  3. Bone marrow biopsy on 07/12/2017 reveals kappa monotypic plasma cell population consistent with multiple myeloma.  Bone marrow is 70% cellular.  Plasma cell is 50-60%.     -There is gain of chromosome 1, 5, 9, 15, and 17.  There is translocation 14;16.   4.  On 07/18/2017:  -M-spike of 1.9.   -Immunofixation reveals monoclonal IgG kappa.    -IgG level of 2970.  IgA of 15 and IgM of 175.    -Kappa free light chain of 67.7.  Lambda free light chain of 1.42.  Ratio of kappa to lambda of 47.71.    -Beta 2 microglobulin of 3.4.   5. PET scan on 07/24/2017 reveals several focal areas of increased FDG uptake consistent with multiple myeloma.  There is probable epithelial cyst in tail of the pancreas.  No associated abnormal FDG activity.  Left thyroid cysts or nodules without any FDG activity.  There is a 0.3 cm left upper lobe lung nodule.   6.  Velcade, Revlimid and dexamethasone between on 08/14/2017 and 04/30/2018. Velcade stopped.   -Revlimid and dexamethasone continued.  -Revlimid held between 12/27/2021 and 03/08/2022.  -Daratumumab added on 11/14/2022.  -Changed to daratumumab, pomalidomide and dexamethasone on 06/12/2023.  7. CT chest, abdomen, and pelvis on 12/30/2021 does not reveal any malignancy.  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     SUBJECTIVE:    Ms. Dunaway is an 89-year-old female with multiple myeloma on daratumumab, pomalidomide and dexamethasone.  She takesn pomalidomide 2 mg a day for 2 weeks on and 1 week off.  Dose reduction due to side effects.  She takes dexamethasone 4 mg weekly.  She is tolerating treatment well.       Patient has dental problem.  She has been advised dental extraction.  Because of that we are holding Zometa.     Her  overall condition is stable.  She has mild generalized weakness secondary to myeloma, old age and medications.  No worsening of weakness.  She can do activities of daily living. She still works. She has arthritic joint pain. No worsening of pain.  She uses walker.     She has chronic dizziness.  No worsening of dizziness.  No recent falls.  No headache.  No chest pain.  No shortness of breath.  No abdominal pain. No nausea. No vomiting.  No urinary complaint.  She gets intermittent diarrhea.  No bleeding.  No fever or night sweats.  All other review of system is negative.      PHYSICAL EXAMINATION:   GENERAL:  Alert and oriented x 3.  Not in distress.  VITAL SIGNS:  Reviewed.       Rest of the system is not examined.     LABORATORY: CBC and CMP reviewed.      ASSESSMENT:    1.  An 89-year-old female with multiple myeloma on daratumumab, pomalidomide and dexamethasone.  Myeloma is overall stable.  2.  Mild normocytic anemia from myeloma and its treatment. Anemia is stable.  3.  Thrombocytopenia. Stable.  4.  Chronic dizziness.  Stable.  5.  Generalized weakness from myeloma, anemia, medication and old age.  Stable.  6.  Arthritic joint pain.  7.  Dental problem.     PLAN:  Continue pomalidomid, daratumumab and dexamethasone. (Pomalidomide 2 mg a day for 2 weeks on and 1 week off.  Dexamethasone 4 mg weekly)  Continue to hold zometa.  See NP in 4 weeks.  See me in 8 weeks.  Continue prophylactic aspirin and acyclovir.     DISCUSSION:  1.  Patient's overall condition is stable.  She has mild generalized weakness.  No worsening.  She is able to do all her activities.  She still works part-time.     Myeloma labs done last month were reviewed.  Monoclonal peak is better at 0.7.  IgG level is normal.  Ruso free light chain went up to 55 from 45.  Explained to the patient that overall I will consider myeloma to be stable.     2.  Discussed regarding treatment.  She will continue on daratumumab, pomalidomide and  dexamethasone.  She is on reduced dose of pomalidomide due to side effects.  No need to increase pomalidomide dose at this time.     Explained to the patient that there are other treatment options when there is progression.  We can give her carfilzomib based therapy.  Other option is also CAR-T therapy.     3.  Patient has dental problem.  She has been recommended complete dental extraction.  She has been thinking about it.  Because of dental problem, will continue to hold Zometa.     4.  She will continue on prophylactic aspirin and acyclovir.     5.  She had a few questions which were all answered.  I will see her in 2 months.  In between she will see our ROSA MARIA.  Advised her to call us with any questions or concerns.     TOTAL VISIT TIME: 40 minutes.  Time spent in today's visit, review of chart/investigations today, monitoring for toxicity of high risk medications and documentation today.

## 2024-12-05 LAB
ALBUMIN SERPL ELPH-MCNC: 3.8 G/DL (ref 3.7–5.1)
ALPHA1 GLOB SERPL ELPH-MCNC: 0.3 G/DL (ref 0.2–0.4)
ALPHA2 GLOB SERPL ELPH-MCNC: 0.6 G/DL (ref 0.5–0.9)
B-GLOBULIN SERPL ELPH-MCNC: 0.6 G/DL (ref 0.6–1)
GAMMA GLOB SERPL ELPH-MCNC: 0.9 G/DL (ref 0.7–1.6)
IGG SERPL-MCNC: 1037 MG/DL (ref 610–1616)
KAPPA LC FREE SER-MCNC: 52.17 MG/DL (ref 0.33–1.94)
KAPPA LC FREE/LAMBDA FREE SER NEPH: 200.65 {RATIO} (ref 0.26–1.65)
LAMBDA LC FREE SERPL-MCNC: 0.26 MG/DL (ref 0.57–2.63)
M PROTEIN SERPL ELPH-MCNC: 0.8 G/DL
PROT PATTERN SERPL ELPH-IMP: ABNORMAL

## 2024-12-09 ENCOUNTER — OFFICE VISIT (OUTPATIENT)
Dept: FAMILY MEDICINE | Facility: CLINIC | Age: 89
End: 2024-12-09
Payer: MEDICARE

## 2024-12-09 VITALS
RESPIRATION RATE: 16 BRPM | BODY MASS INDEX: 26.5 KG/M2 | DIASTOLIC BLOOD PRESSURE: 69 MMHG | HEIGHT: 60 IN | HEART RATE: 89 BPM | TEMPERATURE: 98 F | OXYGEN SATURATION: 97 % | WEIGHT: 135 LBS | SYSTOLIC BLOOD PRESSURE: 121 MMHG

## 2024-12-09 DIAGNOSIS — K13.0 MUCOCELE OF LOWER LIP: ICD-10-CM

## 2024-12-09 DIAGNOSIS — I10 BENIGN ESSENTIAL HYPERTENSION: Primary | ICD-10-CM

## 2024-12-09 DIAGNOSIS — I49.5 SICK SINUS SYNDROME (H): ICD-10-CM

## 2024-12-09 DIAGNOSIS — E05.20 TOXIC MULTINODUL GOITER: ICD-10-CM

## 2024-12-09 DIAGNOSIS — C90.00 MULTIPLE MYELOMA NOT HAVING ACHIEVED REMISSION (H): ICD-10-CM

## 2024-12-09 PROBLEM — N18.30 CKD (CHRONIC KIDNEY DISEASE) STAGE 3, GFR 30-59 ML/MIN (H): Status: RESOLVED | Noted: 2019-12-26 | Resolved: 2024-12-09

## 2024-12-09 PROCEDURE — 99213 OFFICE O/P EST LOW 20 MIN: CPT | Performed by: INTERNAL MEDICINE

## 2024-12-09 RX ORDER — TRIAMCINOLONE ACETONIDE 0.1 %
PASTE (GRAM) DENTAL 2 TIMES DAILY
Qty: 5 G | Refills: 2 | Status: SHIPPED | OUTPATIENT
Start: 2024-12-09

## 2024-12-09 ASSESSMENT — PAIN SCALES - GENERAL: PAINLEVEL_OUTOF10: NO PAIN (0)

## 2024-12-09 NOTE — PROGRESS NOTES
Assessment & Plan     Benign essential hypertension  Blood pressure okay off of lisinopril, has as needed furosemide available for severely elevated blood pressure readings    Mucocele of lower lip  Trial of below, I also think that it would be a good idea to see the dentist to see if the front tooth needs to be pulled or if they have any other ideas of how to manage the situation  - triamcinolone (KENALOG) 0.1 % paste; Take by mouth 2 times daily.    Toxic multinodul goiter  Due for a thyroid function test recheck at the 3-month interval at the end of this month, will add onto planned labs to be drawn regarding her myeloma  - TSH with free T4 reflex; Future    Multiple myeloma not having achieved remission (H)  Continue close follow-up with hematology oncology as directed    Sick sinus syndrome  Pacemaker in place      FUTURE APPOINTMENTS:       -  Return in about 6 weeks (around 1/20/2025) for recheck.  Patient instructed to return to clinic or contact us sooner if symptoms worsen or new symptoms develop.       Dirk Araya is a 89 year old, presenting for the following health issues:  Follow Up (6 weeks)        12/9/2024     1:19 PM   Additional Questions   Roomed by Omar   Accompanied by daughter in law     History of Present Illness       Hypertension: She presents for follow up of hypertension.  She does not check blood pressure  regularly outside of the clinic. Outside blood pressures have been over 140/90. She follows a low salt diet.     She eats 0-1 servings of fruits and vegetables daily.She consumes 1 sweetened beverage(s) daily.She exercises with enough effort to increase her heart rate 9 or less minutes per day.  She exercises with enough effort to increase her heart rate 3 or less days per week.   She is taking medications regularly.           89-year-old here to follow-up on hypertension, hypothyroidism, myeloma  Overall she feels well but she has a painful lesion on her right lower lip  that has been present for several weeks  She has an appointment to see her dentist later this week for this problem  She is also receiving chemotherapy and has some nausea related side effects although she is using Phenergan and Zofran to help with the symptoms          Objective    /69 (BP Location: Left arm, Patient Position: Sitting, Cuff Size: Adult Regular)   Pulse 89   Temp 98  F (36.7  C) (Temporal)   Resp 16   Ht 1.524 m (5')   Wt 61.2 kg (135 lb)   LMP  (LMP Unknown)   SpO2 97%   BMI 26.37 kg/m    Body mass index is 26.37 kg/m .  Physical Exam   General: This is a well-appearing, unchanged appearing female in no acute distress.  HEENT: It looks like she has a mucocele on her left lower lip she does have poor dentition and a lower front tooth that is sticking out that might be causing some abrasion of the lower lip.  Cardiovascular: The heart has a regular rate and rhythm.  Pulmonary: The lungs are clear to auscultation bilaterally, breathing is not labored.  Extremities: No edema.            Signed Electronically by: César Chung MD

## 2024-12-12 LAB
MDC_IDC_EPISODE_DTM: NORMAL
MDC_IDC_EPISODE_DURATION: 1214 S
MDC_IDC_EPISODE_DURATION: 1270 S
MDC_IDC_EPISODE_DURATION: 130 S
MDC_IDC_EPISODE_DURATION: 156 S
MDC_IDC_EPISODE_DURATION: 1695 S
MDC_IDC_EPISODE_DURATION: 181 S
MDC_IDC_EPISODE_DURATION: 183 S
MDC_IDC_EPISODE_DURATION: 183 S
MDC_IDC_EPISODE_DURATION: 192 S
MDC_IDC_EPISODE_DURATION: 203 S
MDC_IDC_EPISODE_DURATION: 203 S
MDC_IDC_EPISODE_DURATION: 223 S
MDC_IDC_EPISODE_DURATION: 2240 S
MDC_IDC_EPISODE_DURATION: 229 S
MDC_IDC_EPISODE_DURATION: 262 S
MDC_IDC_EPISODE_DURATION: 265 S
MDC_IDC_EPISODE_DURATION: 271 S
MDC_IDC_EPISODE_DURATION: 273 S
MDC_IDC_EPISODE_DURATION: 3177 S
MDC_IDC_EPISODE_DURATION: 349 S
MDC_IDC_EPISODE_DURATION: 349 S
MDC_IDC_EPISODE_DURATION: 380 S
MDC_IDC_EPISODE_DURATION: 452 S
MDC_IDC_EPISODE_DURATION: 53 S
MDC_IDC_EPISODE_DURATION: 549 S
MDC_IDC_EPISODE_DURATION: 786 S
MDC_IDC_EPISODE_DURATION: 91 S
MDC_IDC_EPISODE_ID: 257
MDC_IDC_EPISODE_ID: 258
MDC_IDC_EPISODE_ID: 259
MDC_IDC_EPISODE_ID: 260
MDC_IDC_EPISODE_ID: 261
MDC_IDC_EPISODE_ID: 262
MDC_IDC_EPISODE_ID: 263
MDC_IDC_EPISODE_ID: 264
MDC_IDC_EPISODE_ID: 265
MDC_IDC_EPISODE_ID: 266
MDC_IDC_EPISODE_ID: 267
MDC_IDC_EPISODE_ID: 268
MDC_IDC_EPISODE_ID: 269
MDC_IDC_EPISODE_ID: 270
MDC_IDC_EPISODE_ID: 271
MDC_IDC_EPISODE_ID: 272
MDC_IDC_EPISODE_ID: 273
MDC_IDC_EPISODE_ID: 274
MDC_IDC_EPISODE_ID: 275
MDC_IDC_EPISODE_ID: 276
MDC_IDC_EPISODE_ID: 277
MDC_IDC_EPISODE_ID: 278
MDC_IDC_EPISODE_ID: 279
MDC_IDC_EPISODE_ID: 280
MDC_IDC_EPISODE_ID: 281
MDC_IDC_EPISODE_ID: 282
MDC_IDC_EPISODE_ID: 283
MDC_IDC_EPISODE_TYPE: NORMAL
MDC_IDC_LEAD_CONNECTION_STATUS: NORMAL
MDC_IDC_LEAD_CONNECTION_STATUS: NORMAL
MDC_IDC_LEAD_IMPLANT_DT: NORMAL
MDC_IDC_LEAD_IMPLANT_DT: NORMAL
MDC_IDC_LEAD_LOCATION: NORMAL
MDC_IDC_LEAD_LOCATION: NORMAL
MDC_IDC_LEAD_LOCATION_DETAIL_1: NORMAL
MDC_IDC_LEAD_LOCATION_DETAIL_1: NORMAL
MDC_IDC_LEAD_MFG: NORMAL
MDC_IDC_LEAD_MFG: NORMAL
MDC_IDC_LEAD_MODEL: NORMAL
MDC_IDC_LEAD_MODEL: NORMAL
MDC_IDC_LEAD_POLARITY_TYPE: NORMAL
MDC_IDC_LEAD_POLARITY_TYPE: NORMAL
MDC_IDC_LEAD_SERIAL: NORMAL
MDC_IDC_LEAD_SERIAL: NORMAL
MDC_IDC_MSMT_BATTERY_DTM: NORMAL
MDC_IDC_MSMT_BATTERY_REMAINING_LONGEVITY: 8 MO
MDC_IDC_MSMT_BATTERY_RRT_TRIGGER: 2.83
MDC_IDC_MSMT_BATTERY_STATUS: NORMAL
MDC_IDC_MSMT_BATTERY_VOLTAGE: 2.87 V
MDC_IDC_MSMT_LEADCHNL_RA_IMPEDANCE_VALUE: 342 OHM
MDC_IDC_MSMT_LEADCHNL_RA_IMPEDANCE_VALUE: 342 OHM
MDC_IDC_MSMT_LEADCHNL_RA_PACING_THRESHOLD_AMPLITUDE: 0.5 V
MDC_IDC_MSMT_LEADCHNL_RA_PACING_THRESHOLD_PULSEWIDTH: 0.4 MS
MDC_IDC_MSMT_LEADCHNL_RA_SENSING_INTR_AMPL: 1.75 MV
MDC_IDC_MSMT_LEADCHNL_RA_SENSING_INTR_AMPL: 1.75 MV
MDC_IDC_MSMT_LEADCHNL_RV_IMPEDANCE_VALUE: 399 OHM
MDC_IDC_MSMT_LEADCHNL_RV_IMPEDANCE_VALUE: 494 OHM
MDC_IDC_MSMT_LEADCHNL_RV_PACING_THRESHOLD_AMPLITUDE: 1.12 V
MDC_IDC_MSMT_LEADCHNL_RV_PACING_THRESHOLD_PULSEWIDTH: 0.4 MS
MDC_IDC_MSMT_LEADCHNL_RV_SENSING_INTR_AMPL: 8.12 MV
MDC_IDC_MSMT_LEADCHNL_RV_SENSING_INTR_AMPL: 8.12 MV
MDC_IDC_PG_IMPLANT_DTM: NORMAL
MDC_IDC_PG_MFG: NORMAL
MDC_IDC_PG_MODEL: NORMAL
MDC_IDC_PG_SERIAL: NORMAL
MDC_IDC_PG_TYPE: NORMAL
MDC_IDC_SESS_CLINIC_NAME: NORMAL
MDC_IDC_SESS_DTM: NORMAL
MDC_IDC_SESS_TYPE: NORMAL
MDC_IDC_SET_BRADY_AT_MODE_SWITCH_RATE: 171 {BEATS}/MIN
MDC_IDC_SET_BRADY_HYSTRATE: NORMAL
MDC_IDC_SET_BRADY_LOWRATE: 60 {BEATS}/MIN
MDC_IDC_SET_BRADY_MAX_SENSOR_RATE: 120 {BEATS}/MIN
MDC_IDC_SET_BRADY_MAX_TRACKING_RATE: 120 {BEATS}/MIN
MDC_IDC_SET_BRADY_MODE: NORMAL
MDC_IDC_SET_BRADY_PAV_DELAY_LOW: 200 MS
MDC_IDC_SET_BRADY_SAV_DELAY_LOW: 180 MS
MDC_IDC_SET_LEADCHNL_RA_PACING_AMPLITUDE: 1.5 V
MDC_IDC_SET_LEADCHNL_RA_PACING_ANODE_ELECTRODE_1: NORMAL
MDC_IDC_SET_LEADCHNL_RA_PACING_ANODE_LOCATION_1: NORMAL
MDC_IDC_SET_LEADCHNL_RA_PACING_CAPTURE_MODE: NORMAL
MDC_IDC_SET_LEADCHNL_RA_PACING_CATHODE_ELECTRODE_1: NORMAL
MDC_IDC_SET_LEADCHNL_RA_PACING_CATHODE_LOCATION_1: NORMAL
MDC_IDC_SET_LEADCHNL_RA_PACING_POLARITY: NORMAL
MDC_IDC_SET_LEADCHNL_RA_PACING_PULSEWIDTH: 0.4 MS
MDC_IDC_SET_LEADCHNL_RA_SENSING_ANODE_ELECTRODE_1: NORMAL
MDC_IDC_SET_LEADCHNL_RA_SENSING_ANODE_LOCATION_1: NORMAL
MDC_IDC_SET_LEADCHNL_RA_SENSING_CATHODE_ELECTRODE_1: NORMAL
MDC_IDC_SET_LEADCHNL_RA_SENSING_CATHODE_LOCATION_1: NORMAL
MDC_IDC_SET_LEADCHNL_RA_SENSING_POLARITY: NORMAL
MDC_IDC_SET_LEADCHNL_RA_SENSING_SENSITIVITY: 0.3 MV
MDC_IDC_SET_LEADCHNL_RV_PACING_AMPLITUDE: 2.75 V
MDC_IDC_SET_LEADCHNL_RV_PACING_ANODE_ELECTRODE_1: NORMAL
MDC_IDC_SET_LEADCHNL_RV_PACING_ANODE_LOCATION_1: NORMAL
MDC_IDC_SET_LEADCHNL_RV_PACING_CAPTURE_MODE: NORMAL
MDC_IDC_SET_LEADCHNL_RV_PACING_CATHODE_ELECTRODE_1: NORMAL
MDC_IDC_SET_LEADCHNL_RV_PACING_CATHODE_LOCATION_1: NORMAL
MDC_IDC_SET_LEADCHNL_RV_PACING_POLARITY: NORMAL
MDC_IDC_SET_LEADCHNL_RV_PACING_PULSEWIDTH: 0.4 MS
MDC_IDC_SET_LEADCHNL_RV_SENSING_ANODE_ELECTRODE_1: NORMAL
MDC_IDC_SET_LEADCHNL_RV_SENSING_ANODE_LOCATION_1: NORMAL
MDC_IDC_SET_LEADCHNL_RV_SENSING_CATHODE_ELECTRODE_1: NORMAL
MDC_IDC_SET_LEADCHNL_RV_SENSING_CATHODE_LOCATION_1: NORMAL
MDC_IDC_SET_LEADCHNL_RV_SENSING_POLARITY: NORMAL
MDC_IDC_SET_LEADCHNL_RV_SENSING_SENSITIVITY: 0.9 MV
MDC_IDC_SET_ZONE_DETECTION_INTERVAL: 350 MS
MDC_IDC_SET_ZONE_DETECTION_INTERVAL: 400 MS
MDC_IDC_SET_ZONE_STATUS: NORMAL
MDC_IDC_SET_ZONE_STATUS: NORMAL
MDC_IDC_SET_ZONE_TYPE: NORMAL
MDC_IDC_SET_ZONE_VENDOR_TYPE: NORMAL
MDC_IDC_STAT_AT_BURDEN_PERCENT: 0.5 %
MDC_IDC_STAT_AT_DTM_END: NORMAL
MDC_IDC_STAT_AT_DTM_START: NORMAL
MDC_IDC_STAT_BRADY_AP_VP_PERCENT: 91.64 %
MDC_IDC_STAT_BRADY_AP_VS_PERCENT: 0.02 %
MDC_IDC_STAT_BRADY_AS_VP_PERCENT: 8.33 %
MDC_IDC_STAT_BRADY_AS_VS_PERCENT: 0.01 %
MDC_IDC_STAT_BRADY_DTM_END: NORMAL
MDC_IDC_STAT_BRADY_DTM_START: NORMAL
MDC_IDC_STAT_BRADY_RA_PERCENT_PACED: 91.47 %
MDC_IDC_STAT_BRADY_RV_PERCENT_PACED: 99.91 %
MDC_IDC_STAT_EPISODE_RECENT_COUNT: 0
MDC_IDC_STAT_EPISODE_RECENT_COUNT: 27
MDC_IDC_STAT_EPISODE_RECENT_COUNT_DTM_END: NORMAL
MDC_IDC_STAT_EPISODE_RECENT_COUNT_DTM_START: NORMAL
MDC_IDC_STAT_EPISODE_TOTAL_COUNT: 0
MDC_IDC_STAT_EPISODE_TOTAL_COUNT: 0
MDC_IDC_STAT_EPISODE_TOTAL_COUNT: 14
MDC_IDC_STAT_EPISODE_TOTAL_COUNT: 266
MDC_IDC_STAT_EPISODE_TOTAL_COUNT_DTM_END: NORMAL
MDC_IDC_STAT_EPISODE_TOTAL_COUNT_DTM_START: NORMAL
MDC_IDC_STAT_EPISODE_TYPE: NORMAL
MDC_IDC_STAT_TACHYTHERAPY_RECENT_DTM_END: NORMAL
MDC_IDC_STAT_TACHYTHERAPY_RECENT_DTM_START: NORMAL
MDC_IDC_STAT_TACHYTHERAPY_TOTAL_DTM_END: NORMAL
MDC_IDC_STAT_TACHYTHERAPY_TOTAL_DTM_START: NORMAL

## 2024-12-18 ENCOUNTER — TELEPHONE (OUTPATIENT)
Dept: PHARMACY | Facility: CLINIC | Age: 89
End: 2024-12-18
Payer: MEDICARE

## 2024-12-18 DIAGNOSIS — C90.00 MULTIPLE MYELOMA NOT HAVING ACHIEVED REMISSION (H): Primary | ICD-10-CM

## 2024-12-18 NOTE — TELEPHONE ENCOUNTER
Oral Chemotherapy Monitoring Program   Medication: Pomalyst  Rx: 2mg PO daily on days 1 through 14 of 21 day cycle   Auth #: [id number]  Risk Category: adult female of non child bearing potential  Routine survey questions reviewed.   Rx to be Escribed to [facility]     Terrie Bowen [facility] Oncology Pharmacy Liaison  [phone]

## 2024-12-28 ENCOUNTER — HEALTH MAINTENANCE LETTER (OUTPATIENT)
Age: 89
End: 2024-12-28

## 2024-12-31 ENCOUNTER — INFUSION THERAPY VISIT (OUTPATIENT)
Dept: INFUSION THERAPY | Facility: CLINIC | Age: 89
End: 2024-12-31
Attending: NURSE PRACTITIONER
Payer: MEDICARE

## 2024-12-31 ENCOUNTER — ONCOLOGY VISIT (OUTPATIENT)
Dept: ONCOLOGY | Facility: CLINIC | Age: 89
End: 2024-12-31
Attending: INTERNAL MEDICINE
Payer: MEDICARE

## 2024-12-31 VITALS
OXYGEN SATURATION: 97 % | DIASTOLIC BLOOD PRESSURE: 77 MMHG | RESPIRATION RATE: 16 BRPM | TEMPERATURE: 97.8 F | HEART RATE: 90 BPM | WEIGHT: 133.6 LBS | BODY MASS INDEX: 26.09 KG/M2 | SYSTOLIC BLOOD PRESSURE: 144 MMHG

## 2024-12-31 DIAGNOSIS — C90.00 MULTIPLE MYELOMA NOT HAVING ACHIEVED REMISSION (H): Primary | ICD-10-CM

## 2024-12-31 DIAGNOSIS — E05.20 TOXIC MULTINODUL GOITER: ICD-10-CM

## 2024-12-31 LAB
ALBUMIN SERPL BCG-MCNC: 3.9 G/DL (ref 3.5–5.2)
ALP SERPL-CCNC: 90 U/L (ref 40–150)
ALT SERPL W P-5'-P-CCNC: 6 U/L (ref 0–50)
ANION GAP SERPL CALCULATED.3IONS-SCNC: 9 MMOL/L (ref 7–15)
AST SERPL W P-5'-P-CCNC: 11 U/L (ref 0–45)
BASOPHILS # BLD AUTO: 0.1 10E3/UL (ref 0–0.2)
BASOPHILS NFR BLD AUTO: 2 %
BILIRUB SERPL-MCNC: 0.3 MG/DL
BUN SERPL-MCNC: 27.7 MG/DL (ref 8–23)
CALCIUM SERPL-MCNC: 8.9 MG/DL (ref 8.8–10.4)
CHLORIDE SERPL-SCNC: 107 MMOL/L (ref 98–107)
CREAT SERPL-MCNC: 0.66 MG/DL (ref 0.51–0.95)
EGFRCR SERPLBLD CKD-EPI 2021: 84 ML/MIN/1.73M2
EOSINOPHIL # BLD AUTO: 0.2 10E3/UL (ref 0–0.7)
EOSINOPHIL NFR BLD AUTO: 4 %
ERYTHROCYTE [DISTWIDTH] IN BLOOD BY AUTOMATED COUNT: 15.2 % (ref 10–15)
GLUCOSE SERPL-MCNC: 90 MG/DL (ref 70–99)
HCO3 SERPL-SCNC: 26 MMOL/L (ref 22–29)
HCT VFR BLD AUTO: 32.1 % (ref 35–47)
HGB BLD-MCNC: 10.6 G/DL (ref 11.7–15.7)
IMM GRANULOCYTES # BLD: 0 10E3/UL
IMM GRANULOCYTES NFR BLD: 0 %
LYMPHOCYTES # BLD AUTO: 1.9 10E3/UL (ref 0.8–5.3)
LYMPHOCYTES NFR BLD AUTO: 33 %
MCH RBC QN AUTO: 29.4 PG (ref 26.5–33)
MCHC RBC AUTO-ENTMCNC: 33 G/DL (ref 31.5–36.5)
MCV RBC AUTO: 89 FL (ref 78–100)
MONOCYTES # BLD AUTO: 0.8 10E3/UL (ref 0–1.3)
MONOCYTES NFR BLD AUTO: 13 %
NEUTROPHILS # BLD AUTO: 2.9 10E3/UL (ref 1.6–8.3)
NEUTROPHILS NFR BLD AUTO: 48 %
NRBC # BLD AUTO: 0 10E3/UL
NRBC BLD AUTO-RTO: 0 /100
PLATELET # BLD AUTO: 201 10E3/UL (ref 150–450)
POTASSIUM SERPL-SCNC: 3.8 MMOL/L (ref 3.4–5.3)
PROT SERPL-MCNC: 6.7 G/DL (ref 6.4–8.3)
RBC # BLD AUTO: 3.6 10E6/UL (ref 3.8–5.2)
SODIUM SERPL-SCNC: 142 MMOL/L (ref 135–145)
TOTAL PROTEIN SERUM FOR ELP: 6.4 G/DL (ref 6.4–8.3)
TSH SERPL DL<=0.005 MIU/L-ACNC: 3.49 UIU/ML (ref 0.3–4.2)
WBC # BLD AUTO: 5.9 10E3/UL (ref 4–11)

## 2024-12-31 PROCEDURE — G0463 HOSPITAL OUTPT CLINIC VISIT: HCPCS | Performed by: NURSE PRACTITIONER

## 2024-12-31 PROCEDURE — 36415 COLL VENOUS BLD VENIPUNCTURE: CPT

## 2024-12-31 PROCEDURE — 99214 OFFICE O/P EST MOD 30 MIN: CPT | Performed by: NURSE PRACTITIONER

## 2024-12-31 PROCEDURE — 84165 PROTEIN E-PHORESIS SERUM: CPT | Mod: 26 | Performed by: PATHOLOGY

## 2024-12-31 PROCEDURE — 250N000011 HC RX IP 250 OP 636: Mod: JZ | Performed by: INTERNAL MEDICINE

## 2024-12-31 PROCEDURE — 84165 PROTEIN E-PHORESIS SERUM: CPT | Mod: TC | Performed by: PATHOLOGY

## 2024-12-31 PROCEDURE — 96401 CHEMO ANTI-NEOPL SQ/IM: CPT

## 2024-12-31 PROCEDURE — 84443 ASSAY THYROID STIM HORMONE: CPT | Performed by: INTERNAL MEDICINE

## 2024-12-31 PROCEDURE — 84155 ASSAY OF PROTEIN SERUM: CPT | Performed by: INTERNAL MEDICINE

## 2024-12-31 PROCEDURE — 80053 COMPREHEN METABOLIC PANEL: CPT | Performed by: INTERNAL MEDICINE

## 2024-12-31 PROCEDURE — 84295 ASSAY OF SERUM SODIUM: CPT | Performed by: INTERNAL MEDICINE

## 2024-12-31 PROCEDURE — 85004 AUTOMATED DIFF WBC COUNT: CPT | Performed by: INTERNAL MEDICINE

## 2024-12-31 PROCEDURE — 80051 ELECTROLYTE PANEL: CPT | Performed by: INTERNAL MEDICINE

## 2024-12-31 PROCEDURE — 83521 IG LIGHT CHAINS FREE EACH: CPT | Performed by: INTERNAL MEDICINE

## 2024-12-31 PROCEDURE — 82784 ASSAY IGA/IGD/IGG/IGM EACH: CPT | Performed by: INTERNAL MEDICINE

## 2024-12-31 RX ADMIN — DARATUMUMAB AND HYALURONIDASE-FIHJ (HUMAN RECOMBINANT) 1800 MG: 1800; 30000 INJECTION SUBCUTANEOUS at 13:15

## 2024-12-31 ASSESSMENT — PAIN SCALES - GENERAL: PAINLEVEL_OUTOF10: NO PAIN (0)

## 2024-12-31 NOTE — PROGRESS NOTES
Infusion Nursing Note:  Quiana Dunaway presents today for C27D1 darzalex faspro.    Patient seen by provider today: Yes: TIN Reza   present during visit today: Not Applicable.    Note: N/A.      Intravenous Access:  No Intravenous access/labs at this visit.    Treatment Conditions:  Lab Results   Component Value Date    HGB 10.6 (L) 12/31/2024    WBC 5.9 12/31/2024    ANEU 2.3 11/04/2024    ANEUTAUTO 2.9 12/31/2024     12/31/2024        Lab Results   Component Value Date     12/31/2024    POTASSIUM 3.8 12/31/2024    MAG 2.4 (H) 05/26/2020    CR 0.66 12/31/2024    HUMBERTO 8.9 12/31/2024    BILITOTAL 0.3 12/31/2024    ALBUMIN 3.9 12/31/2024    ALT 6 12/31/2024    AST 11 12/31/2024       Results reviewed, labs MET treatment parameters, ok to proceed with treatment.      Post Infusion Assessment:  Patient tolerated injection without incident.  Site patent and intact, free from redness, edema or discomfort.       Discharge Plan:   AVS to patient via MYCHART.  Patient will return as prev kirsty for next appointment.   Patient discharged in stable condition accompanied by: self.  Departure Mode: Ambulatory.      Jimy French RN

## 2024-12-31 NOTE — PROGRESS NOTES
Medical Assistant Note:  Quiana Dunaway presents today for blood draw.    Patient seen by provider today: Yes: marco a.   present during visit today: Not Applicable.    Concerns: No Concerns.    Procedure:  Lab draw site: rac, Needle type: bf, Gauge: 23.    Post Assessment:  Labs drawn without difficulty: Yes.    Discharge Plan:  Departure Mode: Ambulatory.    Face to Face Time: 5 min.    Mariela Lyn, CMA

## 2024-12-31 NOTE — LETTER
12/31/2024      Quiana Dunaway  [address] [facility] [address]      Dear Colleague,    Thank you for referring your patient, Quiana Dunaway, to the [facility]. Please see a copy of my visit note below.    Oncology/Hematology Visit Note  Dec 31, 2024    Reason for Visit: follow up of multiple myeloma IgG kappa high risk  Diagnosed 2017  Follows Dr. Parry  -08/14/2017 -04/30/2018: Velcade, Revlimid and dexamethasone Velcade stopped. Revlimid and dexamethasone continued.  12/30/2024: CT chest, abdomen, and pelvis does not reveal any malignancy.  -Revlimid held between 12/27/2021 and 03/08/2022.  -Daratumumab added on 11/14/2022.  03/13/2023- patient diagnosed with COVID her PCP prescribed molnupiravir.  Which she completed  -We held treatment  -Changed to daratumumab, pomalidomide and dexamethasone on 06/12/2023.  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  Patient is  currently undergoing treatment with daratumumab pomalidomide and dexamethasone. Due to the side effects Pomalyst reduced to 2 mg 2 weeks on 1 week off      Current treatment  Pomalyst 2 mg (14 days on, 14 days  off), daratumumab monthly, and dexamethasone 4 mg weekly        Interval History:  Patient reports since the change of Pomalyst to 2 weeks on 2 weeks off she is feeling significantly better.  Energy has improved.      Review of Systems:  14 point ROS of systems including Constitutional, Eyes, Respiratory, Cardiovascular, Gastroenterology, Genitourinary, Integumentary, Muscularskeletal, Psychiatric were all negative except for pertinent positives noted in my HPI.    Physical Examination:  Physical Exam  HENT:      Head: Normocephalic.   Eyes:      Pupils: Pupils are equal, round, and reactive to light.   Cardiovascular:      Rate and Rhythm: Normal rate.   Pulmonary:      Effort: Pulmonary effort is normal.      Breath sounds: Normal breath sounds.   Abdominal:      General:  Bowel sounds are normal.      Palpations: Abdomen is soft.   Musculoskeletal:         General: Normal range of motion.      Cervical back: Normal range of motion.      Right lower leg: No edema.   Neurological:      Mental Status: She is alert.         LABS  -CBC, CMP labs reviewed  MM labs pending        Assessment and Plan:    multiple myeloma IgG kappa high risk  Diagnosed 2017  Patient Dr. Parry  Patient stated Dr. Parry has changed Pomalyst to 2 mg 2 weeks on 2 weeks off patient reports significant improvement in fatigue reports feeling well  Continue with Pomalyst 2 mg 2 weeks on 2 weeks off  Continue with dexamethasone 4 mg p.o. weekly  Continue with monthly daratumumab  myeloma labs are pending from today      Prophylaxis  Continue with aspirin and acyclovir    Chronic mild neutropenia secondary to multiple myeloma and treatment  ANC normal today  Patient is afebrile and asymptomatic  Neutropenic precautions.  Check temperature frequently in the event of fever chills sweats or any signs symptoms of infection call clinic and go to ER    Chronic anemia secondary to multiple myeloma and treatment  stable  Patient denies bleeding  -will continue to monitor    Chronic thrombocytopenia secondary to multiple myeloma and treatment  Stable  Patient denies bleeding bruising.  In the event of bleeding bruising fall injury or edema call clinic or go to    Bone health  Scheduled for multiple tooth extractions 7/8/2024  Continue to hold Zometa until dental work-up is done  Continue to hold Zometa    Generalized weakness  Chronic issue for the patient      Please call clinic with any changes in health condition or question    MARIUSZ Mckeon CNP        Chart documentation with Dragon Voice recognition Software. Although reviewed after completion, some words and grammatical errors may remain.    Again, thank you for allowing me to participate in the care of your patient.        Sincerely,        MARIUSZ Mckeon  CNP    Electronically signed

## 2024-12-31 NOTE — NURSING NOTE
Oncology Rooming Note    December 31, 2024 12:37 PM   Quiana Dunaway is a 89 year old female who presents for:    Chief Complaint   Patient presents with    Oncology Clinic Visit     Initial Vitals: BP (!) 144/77   Pulse 90   Temp 97.8  F (36.6  C) (Oral)   Resp 16   Wt 60.6 kg (133 lb 9.6 oz)   LMP  (LMP Unknown)   SpO2 97%   BMI 26.09 kg/m   Estimated body mass index is 26.09 kg/m  as calculated from the following:    Height as of 12/9/24: 1.524 m (5').    Weight as of this encounter: 60.6 kg (133 lb 9.6 oz). Body surface area is 1.6 meters squared.  No Pain (0) Comment: Data Unavailable   No LMP recorded (lmp unknown). Patient is postmenopausal.  Allergies reviewed: Yes  Medications reviewed: Yes    Medications: Medication refills not needed today.  Pharmacy name entered into [employer]:    [facility] - [place], [place] - [address]  [id number] - [place], [place] - [address] AT [facility]    Frailty Screening:   Is the patient here for a new oncology consult visit in cancer care? 2. No      Clinical concerns: follow up        Florencia Alicia

## 2024-12-31 NOTE — PROGRESS NOTES
Oncology/Hematology Visit Note  Dec 31, 2024    Reason for Visit: follow up of multiple myeloma IgG kappa high risk  Diagnosed 2017  Follows Dr. Parry  -08/14/2017 -04/30/2018: Velcade, Revlimid and dexamethasone Velcade stopped. Revlimid and dexamethasone continued.  12/30/2024: CT chest, abdomen, and pelvis does not reveal any malignancy.  -Revlimid held between 12/27/2021 and 03/08/2022.  -Daratumumab added on 11/14/2022.  03/13/2023- patient diagnosed with COVID her PCP prescribed molnupiravir.  Which she completed  -We held treatment  -Changed to daratumumab, pomalidomide and dexamethasone on 06/12/2023.  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  Patient is  currently undergoing treatment with daratumumab pomalidomide and dexamethasone. Due to the side effects Pomalyst reduced to 2 mg 2 weeks on 1 week off      Current treatment  Pomalyst 2 mg (14 days on, 14 days  off), daratumumab monthly, and dexamethasone 4 mg weekly        Interval History:  Patient reports since the change of Pomalyst to 2 weeks on 2 weeks off she is feeling significantly better.  Energy has improved.      Review of Systems:  14 point ROS of systems including Constitutional, Eyes, Respiratory, Cardiovascular, Gastroenterology, Genitourinary, Integumentary, Muscularskeletal, Psychiatric were all negative except for pertinent positives noted in my HPI.    Physical Examination:  Physical Exam  HENT:      Head: Normocephalic.   Eyes:      Pupils: Pupils are equal, round, and reactive to light.   Cardiovascular:      Rate and Rhythm: Normal rate.   Pulmonary:      Effort: Pulmonary effort is normal.      Breath sounds: Normal breath sounds.   Abdominal:      General: Bowel sounds are normal.      Palpations: Abdomen is soft.   Musculoskeletal:         General: Normal range of motion.      Cervical back: Normal range of motion.      Right lower leg: No edema.   Neurological:      Mental Status: She is alert.          LABS  -CBC, CMP labs reviewed  MM labs pending        Assessment and Plan:    multiple myeloma IgG kappa high risk  Diagnosed 2017  Patient Dr. Parry  Patient stated Dr. Parry has changed Pomalyst to 2 mg 2 weeks on 2 weeks off patient reports significant improvement in fatigue reports feeling well  Continue with Pomalyst 2 mg 2 weeks on 2 weeks off  Continue with dexamethasone 4 mg p.o. weekly  Continue with monthly daratumumab  myeloma labs are pending from today      Prophylaxis  Continue with aspirin and acyclovir    Chronic mild neutropenia secondary to multiple myeloma and treatment  ANC normal today  Patient is afebrile and asymptomatic  Neutropenic precautions.  Check temperature frequently in the event of fever chills sweats or any signs symptoms of infection call clinic and go to ER    Chronic anemia secondary to multiple myeloma and treatment  stable  Patient denies bleeding  -will continue to monitor    Chronic thrombocytopenia secondary to multiple myeloma and treatment  Stable  Patient denies bleeding bruising.  In the event of bleeding bruising fall injury or edema call clinic or go to    Bone health  Scheduled for multiple tooth extractions 7/8/2024  Continue to hold Zometa until dental work-up is done  Continue to hold Zometa    Generalized weakness  Chronic issue for the patient      Please call clinic with any changes in health condition or question    MARIUSZ Mckeon CNP        Chart documentation with Dragon Voice recognition Software. Although reviewed after completion, some words and grammatical errors may remain.

## 2025-01-02 LAB
ALBUMIN SERPL ELPH-MCNC: 3.7 G/DL (ref 3.7–5.1)
ALPHA1 GLOB SERPL ELPH-MCNC: 0.4 G/DL (ref 0.2–0.4)
ALPHA2 GLOB SERPL ELPH-MCNC: 0.8 G/DL (ref 0.5–0.9)
B-GLOBULIN SERPL ELPH-MCNC: 0.6 G/DL (ref 0.6–1)
GAMMA GLOB SERPL ELPH-MCNC: 0.9 G/DL (ref 0.7–1.6)
IGG SERPL-MCNC: 1037 MG/DL (ref 610–1616)
KAPPA LC FREE SER-MCNC: 51.68 MG/DL (ref 0.33–1.94)
KAPPA LC FREE/LAMBDA FREE SER NEPH: 215.33 {RATIO} (ref 0.26–1.65)
LAMBDA LC FREE SERPL-MCNC: 0.24 MG/DL (ref 0.57–2.63)
LOCATION OF TASK: ABNORMAL
M PROTEIN SERPL ELPH-MCNC: 0.7 G/DL
PROT PATTERN SERPL ELPH-IMP: ABNORMAL

## 2025-01-12 ENCOUNTER — MYC MEDICAL ADVICE (OUTPATIENT)
Dept: FAMILY MEDICINE | Facility: CLINIC | Age: 89
End: 2025-01-12
Payer: MEDICARE

## 2025-01-12 ENCOUNTER — MYC REFILL (OUTPATIENT)
Dept: ONCOLOGY | Facility: CLINIC | Age: 89
End: 2025-01-12
Payer: MEDICARE

## 2025-01-12 DIAGNOSIS — C90.00 MULTIPLE MYELOMA NOT HAVING ACHIEVED REMISSION (H): ICD-10-CM

## 2025-01-12 DIAGNOSIS — F41.1 GAD (GENERALIZED ANXIETY DISORDER): Primary | ICD-10-CM

## 2025-01-13 RX ORDER — ESCITALOPRAM OXALATE 5 MG/1
5 TABLET ORAL DAILY
Qty: 90 TABLET | Refills: 3 | Status: SHIPPED | OUTPATIENT
Start: 2025-01-13

## 2025-01-13 RX ORDER — LORAZEPAM 0.5 MG/1
0.5 TABLET ORAL EVERY 6 HOURS PRN
Qty: 30 TABLET | Refills: 3 | Status: SHIPPED | OUTPATIENT
Start: 2025-01-13

## 2025-01-13 NOTE — TELEPHONE ENCOUNTER
Patient's daughter wishes to pick it up at [facility]. The prescription sent to Mail order pharmacy cannot be transferred due to [facility] law of transferring controlled substance.     Could you send a new prescription to [facility]? I have already canceled the one at mail order.     Thank you,  Anu Stephens, PharmD  [facility]

## 2025-01-20 ENCOUNTER — OFFICE VISIT (OUTPATIENT)
Dept: FAMILY MEDICINE | Facility: CLINIC | Age: 89
End: 2025-01-20
Payer: MEDICARE

## 2025-01-20 VITALS
TEMPERATURE: 98.7 F | SYSTOLIC BLOOD PRESSURE: 134 MMHG | DIASTOLIC BLOOD PRESSURE: 58 MMHG | WEIGHT: 135 LBS | HEART RATE: 85 BPM | RESPIRATION RATE: 16 BRPM | OXYGEN SATURATION: 97 % | BODY MASS INDEX: 26.5 KG/M2 | HEIGHT: 60 IN

## 2025-01-20 DIAGNOSIS — C90.00 MULTIPLE MYELOMA NOT HAVING ACHIEVED REMISSION (H): Primary | ICD-10-CM

## 2025-01-20 DIAGNOSIS — F41.1 GAD (GENERALIZED ANXIETY DISORDER): ICD-10-CM

## 2025-01-20 DIAGNOSIS — I10 BENIGN ESSENTIAL HYPERTENSION: ICD-10-CM

## 2025-01-20 PROCEDURE — G2211 COMPLEX E/M VISIT ADD ON: HCPCS | Performed by: INTERNAL MEDICINE

## 2025-01-20 PROCEDURE — 99213 OFFICE O/P EST LOW 20 MIN: CPT | Performed by: INTERNAL MEDICINE

## 2025-01-20 RX ORDER — PROCHLORPERAZINE MALEATE 10 MG
10 TABLET ORAL EVERY 6 HOURS PRN
Qty: 30 TABLET | Refills: 6 | Status: SHIPPED | OUTPATIENT
Start: 2025-01-20

## 2025-01-20 ASSESSMENT — PAIN SCALES - GENERAL: PAINLEVEL_OUTOF10: MODERATE PAIN (5)

## 2025-01-20 NOTE — PATIENT INSTRUCTIONS
Tylenol (acetaminophen) is safer for you than ibuprofen (Advil).  You may safely take 2 X 500 mg = 1000 mg of Tylenol up to 3 times daily.  If you have pain that does not improve adequately with Tylenol, then you can use ibuprofen (Advil).  You can take up to 600 mg (3 X 200 mg) tablets up to 3 times daily as needed for pain not controlled by Tylenol.

## 2025-01-20 NOTE — PROGRESS NOTES
Assessment & Plan     Multiple myeloma not having achieved remission (H)  Continue follow-up with oncology as directed  - prochlorperazine (COMPAZINE) 10 MG tablet; Take 1 tablet (10 mg) by mouth every 6 hours as needed (Nausea/Vomiting).    Benign essential hypertension  Blood pressure under good control in clinic today    Generalized anxiety disorder  Continue Lexapro, discussed risks of lorazepam, use only sparingly for breakthrough symptoms not controlled by Lexapro, discussed that it may take 2 to 3 weeks on Lexapro for it to have its full effect              FUTURE APPOINTMENTS:       - Return in about 6 weeks (around 3/3/2025). Patient instructed to return to clinic or contact us sooner if symptoms worsen or new symptoms develop.     Dirk Araya is a 89 year old, presenting for the following health issues:  Follow Up        1/20/2025     1:16 PM   Additional Questions   Roomed by Haydee   Accompanied by Regi, daughter-in-law     History of Present Illness       Hypertension: She presents for follow up of hypertension.  She does not check blood pressure  regularly outside of the clinic. Outside blood pressures have been over 140/90. She follows a low salt diet.     She eats 0-1 servings of fruits and vegetables daily.She consumes 1 sweetened beverage(s) daily.She exercises with enough effort to increase her heart rate 9 or less minutes per day.  She exercises with enough effort to increase her heart rate 3 or less days per week.   She is taking medications regularly.     Here for routine follow-up of her blood pressure and anxiety  She stopped taking Lexapro but had an episode of severe anxiety a little over a week ago  Lexapro was restarted over [employer] message  She has been on it since last Thursday  She had some questions about how to manage her over-the-counter analgesics                Objective    LMP  (LMP Unknown)   There is no height or weight on file to calculate BMI.  Physical Exam    GENERAL: alert and no distress  RESP: lungs clear to auscultation - no rales, rhonchi or wheezes  CV: Heart with regular rate and rhythm.   MS: no gross musculoskeletal defects noted, no edema  NEURO: Normal strength and tone, mentation intact and speech normal  PSYCH: mentation appears normal, affect normal/bright            Signed Electronically by: César Chung MD      The longitudinal plan of care for the diagnosis(es)/condition(s) as documented were addressed during this visit. Due to the added complexity in care, I will continue to support Quiana in the subsequent management and with ongoing continuity of care.

## 2025-01-21 ENCOUNTER — TELEPHONE (OUTPATIENT)
Dept: PHARMACY | Facility: CLINIC | Age: 89
End: 2025-01-21
Payer: MEDICARE

## 2025-01-21 NOTE — TELEPHONE ENCOUNTER
Oral Chemotherapy Monitoring Program   Medication: Pomalyst  Rx: 2mg PO daily on days 1 through 14 of 21 day cycle   Auth #:  [id number]  Risk Category: adult female of non child bearing potential  Routine survey questions reviewed.   [facility]     [facility]  [phone]    Reminded the patient to:    Do not share your medication with any other person.   Do not donate to a blood bank while taking Pomalyst.

## 2025-01-27 RX ORDER — METHYLPREDNISOLONE SODIUM SUCCINATE 40 MG/ML
40 INJECTION INTRAMUSCULAR; INTRAVENOUS
Status: CANCELLED
Start: 2025-01-28

## 2025-01-27 RX ORDER — EPINEPHRINE 1 MG/ML
0.3 INJECTION, SOLUTION INTRAMUSCULAR; SUBCUTANEOUS EVERY 5 MIN PRN
Status: CANCELLED | OUTPATIENT
Start: 2025-01-28

## 2025-01-27 RX ORDER — LORAZEPAM 2 MG/ML
0.5 INJECTION INTRAMUSCULAR EVERY 4 HOURS PRN
Status: CANCELLED | OUTPATIENT
Start: 2025-01-28

## 2025-01-27 RX ORDER — ALBUTEROL SULFATE 90 UG/1
1-2 INHALANT RESPIRATORY (INHALATION)
Status: CANCELLED
Start: 2025-01-28

## 2025-01-27 RX ORDER — DEXAMETHASONE 4 MG/1
12 TABLET ORAL
Status: CANCELLED | OUTPATIENT
Start: 2025-01-28

## 2025-01-27 RX ORDER — DIPHENHYDRAMINE HCL 25 MG
50 CAPSULE ORAL
Status: CANCELLED | OUTPATIENT
Start: 2025-01-28

## 2025-01-27 RX ORDER — MEPERIDINE HYDROCHLORIDE 25 MG/ML
25 INJECTION INTRAMUSCULAR; INTRAVENOUS; SUBCUTANEOUS
Status: CANCELLED | OUTPATIENT
Start: 2025-01-28

## 2025-01-27 RX ORDER — DIPHENHYDRAMINE HYDROCHLORIDE 50 MG/ML
25 INJECTION INTRAMUSCULAR; INTRAVENOUS
Status: CANCELLED
Start: 2025-01-28

## 2025-01-27 RX ORDER — HEPARIN SODIUM,PORCINE 10 UNIT/ML
5 VIAL (ML) INTRAVENOUS
Status: CANCELLED | OUTPATIENT
Start: 2025-01-28

## 2025-01-27 RX ORDER — ACETAMINOPHEN 325 MG/1
650 TABLET ORAL
Status: CANCELLED | OUTPATIENT
Start: 2025-01-28

## 2025-01-27 RX ORDER — HEPARIN SODIUM (PORCINE) LOCK FLUSH IV SOLN 100 UNIT/ML 100 UNIT/ML
5 SOLUTION INTRAVENOUS
Status: CANCELLED | OUTPATIENT
Start: 2025-01-28

## 2025-01-27 RX ORDER — ALBUTEROL SULFATE 0.83 MG/ML
2.5 SOLUTION RESPIRATORY (INHALATION)
Status: CANCELLED | OUTPATIENT
Start: 2025-01-28

## 2025-01-27 RX ORDER — DIPHENHYDRAMINE HYDROCHLORIDE 50 MG/ML
50 INJECTION INTRAMUSCULAR; INTRAVENOUS
Status: CANCELLED
Start: 2025-01-28

## 2025-01-28 ENCOUNTER — LAB (OUTPATIENT)
Dept: INFUSION THERAPY | Facility: CLINIC | Age: 89
End: 2025-01-28
Attending: NURSE PRACTITIONER
Payer: MEDICARE

## 2025-01-28 ENCOUNTER — ONCOLOGY VISIT (OUTPATIENT)
Dept: ONCOLOGY | Facility: CLINIC | Age: 89
End: 2025-01-28
Attending: NURSE PRACTITIONER
Payer: MEDICARE

## 2025-01-28 VITALS
OXYGEN SATURATION: 95 % | WEIGHT: 128 LBS | BODY MASS INDEX: 25 KG/M2 | TEMPERATURE: 98.6 F | SYSTOLIC BLOOD PRESSURE: 146 MMHG | RESPIRATION RATE: 16 BRPM | HEART RATE: 88 BPM | DIASTOLIC BLOOD PRESSURE: 74 MMHG

## 2025-01-28 VITALS — WEIGHT: 132 LBS | BODY MASS INDEX: 25.78 KG/M2

## 2025-01-28 DIAGNOSIS — C90.00 MULTIPLE MYELOMA NOT HAVING ACHIEVED REMISSION (H): Primary | ICD-10-CM

## 2025-01-28 LAB
ALBUMIN SERPL BCG-MCNC: 3.8 G/DL (ref 3.5–5.2)
ALP SERPL-CCNC: 98 U/L (ref 40–150)
ALT SERPL W P-5'-P-CCNC: 9 U/L (ref 0–50)
ANION GAP SERPL CALCULATED.3IONS-SCNC: 9 MMOL/L (ref 7–15)
AST SERPL W P-5'-P-CCNC: 14 U/L (ref 0–45)
BASOPHILS # BLD AUTO: 0.1 10E3/UL (ref 0–0.2)
BASOPHILS NFR BLD AUTO: 2 %
BILIRUB SERPL-MCNC: 0.3 MG/DL
BUN SERPL-MCNC: 21.3 MG/DL (ref 8–23)
CALCIUM SERPL-MCNC: 8.9 MG/DL (ref 8.8–10.4)
CHLORIDE SERPL-SCNC: 107 MMOL/L (ref 98–107)
CREAT SERPL-MCNC: 0.73 MG/DL (ref 0.51–0.95)
EGFRCR SERPLBLD CKD-EPI 2021: 79 ML/MIN/1.73M2
EOSINOPHIL # BLD AUTO: 0.2 10E3/UL (ref 0–0.7)
EOSINOPHIL NFR BLD AUTO: 3 %
ERYTHROCYTE [DISTWIDTH] IN BLOOD BY AUTOMATED COUNT: 15.5 % (ref 10–15)
GLUCOSE SERPL-MCNC: 90 MG/DL (ref 70–99)
HCO3 SERPL-SCNC: 27 MMOL/L (ref 22–29)
HCT VFR BLD AUTO: 33.6 % (ref 35–47)
HGB BLD-MCNC: 10.5 G/DL (ref 11.7–15.7)
IMM GRANULOCYTES # BLD: 0 10E3/UL
IMM GRANULOCYTES NFR BLD: 1 %
LYMPHOCYTES # BLD AUTO: 1.7 10E3/UL (ref 0.8–5.3)
LYMPHOCYTES NFR BLD AUTO: 29 %
MCH RBC QN AUTO: 28.5 PG (ref 26.5–33)
MCHC RBC AUTO-ENTMCNC: 31.3 G/DL (ref 31.5–36.5)
MCV RBC AUTO: 91 FL (ref 78–100)
MONOCYTES # BLD AUTO: 0.6 10E3/UL (ref 0–1.3)
MONOCYTES NFR BLD AUTO: 10 %
NEUTROPHILS # BLD AUTO: 3.3 10E3/UL (ref 1.6–8.3)
NEUTROPHILS NFR BLD AUTO: 56 %
NRBC # BLD AUTO: 0 10E3/UL
NRBC BLD AUTO-RTO: 0 /100
PLATELET # BLD AUTO: 224 10E3/UL (ref 150–450)
POTASSIUM SERPL-SCNC: 3.7 MMOL/L (ref 3.4–5.3)
PROT SERPL-MCNC: 6.7 G/DL (ref 6.4–8.3)
RBC # BLD AUTO: 3.69 10E6/UL (ref 3.8–5.2)
SODIUM SERPL-SCNC: 143 MMOL/L (ref 135–145)
TOTAL PROTEIN SERUM FOR ELP: 6.4 G/DL (ref 6.4–8.3)
WBC # BLD AUTO: 5.9 10E3/UL (ref 4–11)

## 2025-01-28 PROCEDURE — 85025 COMPLETE CBC W/AUTO DIFF WBC: CPT | Performed by: NURSE PRACTITIONER

## 2025-01-28 PROCEDURE — 83521 IG LIGHT CHAINS FREE EACH: CPT | Performed by: NURSE PRACTITIONER

## 2025-01-28 PROCEDURE — 84295 ASSAY OF SERUM SODIUM: CPT | Performed by: INTERNAL MEDICINE

## 2025-01-28 PROCEDURE — 84155 ASSAY OF PROTEIN SERUM: CPT | Performed by: NURSE PRACTITIONER

## 2025-01-28 PROCEDURE — 99214 OFFICE O/P EST MOD 30 MIN: CPT | Performed by: NURSE PRACTITIONER

## 2025-01-28 PROCEDURE — 84165 PROTEIN E-PHORESIS SERUM: CPT | Mod: 26 | Performed by: PATHOLOGY

## 2025-01-28 PROCEDURE — 84165 PROTEIN E-PHORESIS SERUM: CPT | Mod: TC | Performed by: PATHOLOGY

## 2025-01-28 PROCEDURE — 250N000011 HC RX IP 250 OP 636: Mod: JZ | Performed by: NURSE PRACTITIONER

## 2025-01-28 PROCEDURE — G0463 HOSPITAL OUTPT CLINIC VISIT: HCPCS | Mod: 25 | Performed by: NURSE PRACTITIONER

## 2025-01-28 PROCEDURE — 36415 COLL VENOUS BLD VENIPUNCTURE: CPT | Performed by: NURSE PRACTITIONER

## 2025-01-28 PROCEDURE — 82784 ASSAY IGA/IGD/IGG/IGM EACH: CPT | Performed by: NURSE PRACTITIONER

## 2025-01-28 PROCEDURE — 96401 CHEMO ANTI-NEOPL SQ/IM: CPT

## 2025-01-28 RX ADMIN — DARATUMUMAB AND HYALURONIDASE-FIHJ (HUMAN RECOMBINANT) 1800 MG: 1800; 30000 INJECTION SUBCUTANEOUS at 09:49

## 2025-01-28 ASSESSMENT — PAIN SCALES - GENERAL: PAINLEVEL_OUTOF10: NO PAIN (0)

## 2025-01-28 NOTE — PROGRESS NOTES
Infusion Nursing Note:  Quiana Dunaway presents today for C28D1: Darzalex Faspro.    Patient seen by provider today: Yes: Seen by Juaquin Hammond NP today prior to infusion   present during visit today: Not Applicable.    Note: Patient arrives to infusion accompanied by her daughter in law.  Reports to feeling well with no new concerns since seeing Juaquin Hammond NP earlier today.       Intravenous Access:  No Intravenous access/labs at this visit.    Treatment Conditions:  Lab Results   Component Value Date    HGB 10.5 (L) 01/28/2025    WBC 5.9 01/28/2025    ANEU 2.3 11/04/2024    ANEUTAUTO 3.3 01/28/2025     01/28/2025        Results reviewed, labs MET treatment parameters, ok to proceed with treatment.      Post Infusion Assessment:  Patient tolerated one Darzalex Faspro injection to left abdomen without incident.       Discharge Plan:   Patient declined prescription refills.  Discharge instructions reviewed with: Patient and Family.  Patient and/or family verbalized understanding of discharge instructions and all questions answered.  Copy of AVS reviewed with patient and/or family.  Patient will return 2/25/25 for labs, to see Dr. Parry and C29D1: Darzalex Faspro for next appointment.  Patient discharged in stable condition accompanied by: daughter-in-law.  Departure Mode: Ambulatory with walker.      Michelle Quintana RN

## 2025-01-28 NOTE — LETTER
1/28/2025      Quiana Dunaway  [address] [facility] [address]      Dear Colleague,    Thank you for referring your patient, Quiana Dunaway, to the [facility]. Please see a copy of my visit note below.    Oncology Rooming Note    January 28, 2025 9:22 AM   Quiana Dunaway is a 89 year old female who presents for:    Chief Complaint   Patient presents with     Oncology Clinic Visit     Initial Vitals: BP (!) 146/74   Pulse 88   Temp 98.6  F (37  C) (Oral)   Resp 16   Wt 58.1 kg (128 lb)   LMP  (LMP Unknown)   SpO2 95%   BMI 25.00 kg/m   Estimated body mass index is 25 kg/m  as calculated from the following:    Height as of 1/20/25: 1.524 m (5').    Weight as of this encounter: 58.1 kg (128 lb). Body surface area is 1.57 meters squared.  No Pain (0) Comment: Data Unavailable   No LMP recorded (lmp unknown). Patient is postmenopausal.  Allergies reviewed: Yes  Medications reviewed: Yes    Medications: Medication refills not needed today.  Pharmacy name entered into [employer]:    [facility] - [place], [place] - [address]  [id number] - [place], [place] - [address] AT [facility] [address]    Frailty Screening:   Is the patient here for a new oncology consult visit in cancer care? 2. No      Clinical concerns:   np was notified.      Mariela Lyn [facility]              Oncology/Hematology Visit Note  Jan 28, 2025    Reason for Visit: follow up of multiple myeloma IgG kappa high risk  Diagnosed 2017  Follows Dr. Parry  -08/14/2017 -04/30/2018: Velcade, Revlimid and dexamethasone Velcade stopped. Revlimid and dexamethasone continued.  12/30/2024: CT chest, abdomen, and pelvis does not reveal any malignancy.  -Revlimid held between 12/27/2021 and 03/08/2022.  -Daratumumab added on 11/14/2022.  03/13/2023- patient diagnosed with COVID her PCP prescribed molnupiravir.  Which she completed  -We held treatment  -Changed  to daratumumab, pomalidomide and dexamethasone on 06/12/2023.  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  Patient is  currently undergoing treatment with daratumumab pomalidomide and dexamethasone. Due to the side effects Pomalyst reduced to 2 mg 2 weeks on 1 week off      Current treatment  Pomalyst 2 mg (14 days on, 14 days  off), daratumumab monthly, and dexamethasone 4 mg weekly        Interval History:  Denies fever chills sweats cough shortness of breath chest pain    Review of Systems:  14 point ROS of systems including Constitutional, Eyes, Respiratory, Cardiovascular, Gastroenterology, Genitourinary, Integumentary, Muscularskeletal, Psychiatric were all negative except for pertinent positives noted in my HPI.    Physical Examination:  Physical Exam  HENT:      Head: Normocephalic.   Eyes:      Pupils: Pupils are equal, round, and reactive to light.   Cardiovascular:      Rate and Rhythm: Normal rate.   Pulmonary:      Effort: Pulmonary effort is normal.      Breath sounds: Normal breath sounds.   Abdominal:      General: Bowel sounds are normal.      Palpations: Abdomen is soft.   Musculoskeletal:         General: Normal range of motion.      Cervical back: Normal range of motion.      Right lower leg: No edema.   Neurological:      Mental Status: She is alert.         LABS  -CBC, CMP labs reviewed  MM labs pending        Assessment and Plan:    multiple myeloma IgG kappa high risk  Diagnosed 2017  Patient Dr. Parry  Patient stated Dr. Parry has changed Pomalyst to 2 mg 2 weeks on 2 weeks off patient reports significant improvement in fatigue reports feeling well  Continue with Pomalyst 2 mg 2 weeks on 2 weeks off  Continue with dexamethasone 4 mg p.o. weekly  Continue with monthly daratumumab  myeloma labs are pending from today      Prophylaxis  Continue with aspirin and acyclovir      Chronic anemia secondary to multiple myeloma and treatment  stable  Patient denies  bleeding  -will continue to monitor      Bone health  Scheduled for multiple tooth extractions 7/8/2024  Continue to hold Zometa until dental work-up is done  Continue to hold Zometa    Generalized weakness  Chronic issue for the patient      Please call clinic with any changes in health condition or question    MARIUSZ Mckeon CNP        Chart documentation with Dragon Voice recognition Software. Although reviewed after completion, some words and grammatical errors may remain.      Again, thank you for allowing me to participate in the care of your patient.        Sincerely,        MARIUSZ Mckeon CNP    Electronically signed

## 2025-01-28 NOTE — PROGRESS NOTES
Nursing Note:  Quiana Dunaway presents today for kabs.    Patient seen by provider today: Yes: Juaquin   present during visit today: Not Applicable.    Note: N/A.    Intravenous Access:  Lab draw site left ac, Needle type butterfly, Gauge 23.  Labs drawn without difficulty.    Discharge Plan:   Patient was sent to clinic for provider appointment.    Jessica Shetty RN

## 2025-01-28 NOTE — PROGRESS NOTES
Oncology Rooming Note    January 28, 2025 9:22 AM   Quiana Dunaway is a 89 year old female who presents for:    Chief Complaint   Patient presents with    Oncology Clinic Visit     Initial Vitals: BP (!) 146/74   Pulse 88   Temp 98.6  F (37  C) (Oral)   Resp 16   Wt 58.1 kg (128 lb)   LMP  (LMP Unknown)   SpO2 95%   BMI 25.00 kg/m   Estimated body mass index is 25 kg/m  as calculated from the following:    Height as of 1/20/25: 1.524 m (5').    Weight as of this encounter: 58.1 kg (128 lb). Body surface area is 1.57 meters squared.  No Pain (0) Comment: Data Unavailable   No LMP recorded (lmp unknown). Patient is postmenopausal.  Allergies reviewed: Yes  Medications reviewed: Yes    Medications: Medication refills not needed today.  Pharmacy name entered into [employer]:    [facility] - [place], [place] - [address]  [id number] - [place], [place] - [address] AT [facility]    Frailty Screening:   Is the patient here for a new oncology consult visit in cancer care? 2. No      Clinical concerns:   np was notified.      Mariela Lyn CMA

## 2025-01-28 NOTE — PROGRESS NOTES
Oncology/Hematology Visit Note  Jan 28, 2025    Reason for Visit: follow up of multiple myeloma IgG kappa high risk  Diagnosed 2017  Follows Dr. Parry  -08/14/2017 -04/30/2018: Velcade, Revlimid and dexamethasone Velcade stopped. Revlimid and dexamethasone continued.  12/30/2024: CT chest, abdomen, and pelvis does not reveal any malignancy.  -Revlimid held between 12/27/2021 and 03/08/2022.  -Daratumumab added on 11/14/2022.  03/13/2023- patient diagnosed with COVID her PCP prescribed molnupiravir.  Which she completed  -We held treatment  -Changed to daratumumab, pomalidomide and dexamethasone on 06/12/2023.  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  Patient is  currently undergoing treatment with daratumumab pomalidomide and dexamethasone. Due to the side effects Pomalyst reduced to 2 mg 2 weeks on 1 week off      Current treatment  Pomalyst 2 mg (14 days on, 14 days  off), daratumumab monthly, and dexamethasone 4 mg weekly        Interval History:  Denies fever chills sweats cough shortness of breath chest pain    Review of Systems:  14 point ROS of systems including Constitutional, Eyes, Respiratory, Cardiovascular, Gastroenterology, Genitourinary, Integumentary, Muscularskeletal, Psychiatric were all negative except for pertinent positives noted in my HPI.    Physical Examination:  Physical Exam  HENT:      Head: Normocephalic.   Eyes:      Pupils: Pupils are equal, round, and reactive to light.   Cardiovascular:      Rate and Rhythm: Normal rate.   Pulmonary:      Effort: Pulmonary effort is normal.      Breath sounds: Normal breath sounds.   Abdominal:      General: Bowel sounds are normal.      Palpations: Abdomen is soft.   Musculoskeletal:         General: Normal range of motion.      Cervical back: Normal range of motion.      Right lower leg: No edema.   Neurological:      Mental Status: She is alert.         LABS  -CBC, CMP labs reviewed  MM labs  pending        Assessment and Plan:    multiple myeloma IgG kappa high risk  Diagnosed 2017  Patient Dr. Parry  Patient stated Dr. Parry has changed Pomalyst to 2 mg 2 weeks on 2 weeks off patient reports significant improvement in fatigue reports feeling well  Continue with Pomalyst 2 mg 2 weeks on 2 weeks off  Continue with dexamethasone 4 mg p.o. weekly  Continue with monthly daratumumab  myeloma labs are pending from today      Prophylaxis  Continue with aspirin and acyclovir      Chronic anemia secondary to multiple myeloma and treatment  stable  Patient denies bleeding  -will continue to monitor      Bone health  Scheduled for multiple tooth extractions 7/8/2024  Continue to hold Zometa until dental work-up is done  Continue to hold Zometa    Generalized weakness  Chronic issue for the patient      Please call clinic with any changes in health condition or question    MRAIUSZ Mckeon CNP        Chart documentation with Dragon Voice recognition Software. Although reviewed after completion, some words and grammatical errors may remain.

## 2025-01-29 LAB
ALBUMIN SERPL ELPH-MCNC: 3.6 G/DL (ref 3.7–5.1)
ALPHA1 GLOB SERPL ELPH-MCNC: 0.4 G/DL (ref 0.2–0.4)
ALPHA2 GLOB SERPL ELPH-MCNC: 0.8 G/DL (ref 0.5–0.9)
B-GLOBULIN SERPL ELPH-MCNC: 0.6 G/DL (ref 0.6–1)
GAMMA GLOB SERPL ELPH-MCNC: 1 G/DL (ref 0.7–1.6)
IGG SERPL-MCNC: 1106 MG/DL (ref 610–1616)
KAPPA LC FREE SER-MCNC: 66.7 MG/DL (ref 0.33–1.94)
KAPPA LC FREE/LAMBDA FREE SER NEPH: 266.8 {RATIO} (ref 0.26–1.65)
LAMBDA LC FREE SERPL-MCNC: 0.25 MG/DL (ref 0.57–2.63)
LOCATION OF TASK: ABNORMAL
M PROTEIN SERPL ELPH-MCNC: 0.9 G/DL
PROT PATTERN SERPL ELPH-IMP: ABNORMAL

## 2025-02-12 DIAGNOSIS — R11.0 NAUSEA: ICD-10-CM

## 2025-02-12 DIAGNOSIS — E05.90: ICD-10-CM

## 2025-02-12 RX ORDER — FAMOTIDINE 40 MG/1
40 TABLET, FILM COATED ORAL 2 TIMES DAILY
Qty: 180 TABLET | Refills: 3 | Status: SHIPPED | OUTPATIENT
Start: 2025-02-12

## 2025-02-12 RX ORDER — METHIMAZOLE 5 MG/1
2.5 TABLET ORAL DAILY
Qty: 45 TABLET | Refills: 3 | Status: SHIPPED | OUTPATIENT
Start: 2025-02-12

## 2025-02-17 DIAGNOSIS — C90.00 MULTIPLE MYELOMA NOT HAVING ACHIEVED REMISSION (H): Primary | ICD-10-CM

## 2025-02-18 ENCOUNTER — HOSPITAL ENCOUNTER (INPATIENT)
Facility: CLINIC | Age: 89
DRG: 193 | End: 2025-02-18
Attending: EMERGENCY MEDICINE | Admitting: HOSPITALIST
Payer: MEDICARE

## 2025-02-18 ENCOUNTER — APPOINTMENT (OUTPATIENT)
Dept: CARDIOLOGY | Facility: CLINIC | Age: 89
DRG: 193 | End: 2025-02-18
Payer: MEDICARE

## 2025-02-18 ENCOUNTER — APPOINTMENT (OUTPATIENT)
Dept: GENERAL RADIOLOGY | Facility: CLINIC | Age: 89
DRG: 193 | End: 2025-02-18
Payer: MEDICARE

## 2025-02-18 ENCOUNTER — APPOINTMENT (OUTPATIENT)
Dept: GENERAL RADIOLOGY | Facility: CLINIC | Age: 89
DRG: 193 | End: 2025-02-18
Attending: EMERGENCY MEDICINE
Payer: MEDICARE

## 2025-02-18 ENCOUNTER — TELEPHONE (OUTPATIENT)
Dept: PHARMACY | Facility: CLINIC | Age: 89
End: 2025-02-18

## 2025-02-18 DIAGNOSIS — C90.00 MULTIPLE MYELOMA NOT HAVING ACHIEVED REMISSION (H): Primary | ICD-10-CM

## 2025-02-18 DIAGNOSIS — J15.9 COMMUNITY ACQUIRED BACTERIAL PNEUMONIA: Primary | ICD-10-CM

## 2025-02-18 DIAGNOSIS — R79.89 ELEVATED TROPONIN: ICD-10-CM

## 2025-02-18 DIAGNOSIS — J10.1 INFLUENZA B: ICD-10-CM

## 2025-02-18 DIAGNOSIS — J96.01 ACUTE RESPIRATORY FAILURE WITH HYPOXIA (H): ICD-10-CM

## 2025-02-18 LAB
ANION GAP SERPL CALCULATED.3IONS-SCNC: 13 MMOL/L (ref 7–15)
ATRIAL RATE - MUSE: 78 BPM
BASE EXCESS BLDV CALC-SCNC: 1 MMOL/L (ref -3–3)
BASOPHILS # BLD MANUAL: 0 10E3/UL (ref 0–0.2)
BASOPHILS NFR BLD MANUAL: 0 %
BUN SERPL-MCNC: 19.8 MG/DL (ref 8–23)
CALCIUM SERPL-MCNC: 8.3 MG/DL (ref 8.8–10.4)
CHLORIDE SERPL-SCNC: 99 MMOL/L (ref 98–107)
CREAT SERPL-MCNC: 0.77 MG/DL (ref 0.51–0.95)
DIASTOLIC BLOOD PRESSURE - MUSE: NORMAL MMHG
EGFRCR SERPLBLD CKD-EPI 2021: 74 ML/MIN/1.73M2
ELLIPTOCYTES BLD QL SMEAR: SLIGHT
EOSINOPHIL # BLD MANUAL: 0 10E3/UL (ref 0–0.7)
EOSINOPHIL NFR BLD MANUAL: 0 %
ERYTHROCYTE [DISTWIDTH] IN BLOOD BY AUTOMATED COUNT: 17.3 % (ref 10–15)
FLUAV RNA SPEC QL NAA+PROBE: NEGATIVE
FLUBV RNA RESP QL NAA+PROBE: POSITIVE
FRAGMENTS BLD QL SMEAR: SLIGHT
GLUCOSE SERPL-MCNC: 112 MG/DL (ref 70–99)
HCO3 BLDV-SCNC: 26 MMOL/L (ref 21–28)
HCO3 SERPL-SCNC: 25 MMOL/L (ref 22–29)
HCT VFR BLD AUTO: 30.8 % (ref 35–47)
HGB BLD-MCNC: 9.9 G/DL (ref 11.7–15.7)
INTERPRETATION ECG - MUSE: NORMAL
LACTATE BLD-SCNC: 1.5 MMOL/L
LACTATE SERPL-SCNC: 1.2 MMOL/L (ref 0.7–2)
LVEF ECHO: NORMAL
LYMPHOCYTES # BLD MANUAL: 2 10E3/UL (ref 0.8–5.3)
LYMPHOCYTES NFR BLD MANUAL: 37 %
MCH RBC QN AUTO: 28.4 PG (ref 26.5–33)
MCHC RBC AUTO-ENTMCNC: 32.1 G/DL (ref 31.5–36.5)
MCV RBC AUTO: 89 FL (ref 78–100)
MONOCYTES # BLD MANUAL: 2.5 10E3/UL (ref 0–1.3)
MONOCYTES NFR BLD MANUAL: 46 %
NEUTROPHILS # BLD MANUAL: 0.9 10E3/UL (ref 1.6–8.3)
NEUTROPHILS NFR BLD MANUAL: 17 %
NT-PROBNP SERPL-MCNC: 3598 PG/ML (ref 0–1800)
P AXIS - MUSE: 5 DEGREES
PCO2 BLDV: 40 MM HG (ref 40–50)
PH BLDV: 7.42 [PH] (ref 7.32–7.43)
PLAT MORPH BLD: ABNORMAL
PLATELET # BLD AUTO: 115 10E3/UL (ref 150–450)
PO2 BLDV: 33 MM HG (ref 25–47)
POTASSIUM SERPL-SCNC: 4.1 MMOL/L (ref 3.4–5.3)
PR INTERVAL - MUSE: NORMAL MS
PROCALCITONIN SERPL IA-MCNC: 0.77 NG/ML
QRS DURATION - MUSE: 134 MS
QT - MUSE: 452 MS
QTC - MUSE: 515 MS
R AXIS - MUSE: -53 DEGREES
RBC # BLD AUTO: 3.48 10E6/UL (ref 3.8–5.2)
RBC MORPH BLD: ABNORMAL
RSV RNA SPEC NAA+PROBE: NEGATIVE
SAO2 % BLDV: 64 % (ref 70–75)
SARS-COV-2 RNA RESP QL NAA+PROBE: NEGATIVE
SODIUM SERPL-SCNC: 137 MMOL/L (ref 135–145)
SYSTOLIC BLOOD PRESSURE - MUSE: NORMAL MMHG
T AXIS - MUSE: 79 DEGREES
TROPONIN T SERPL HS-MCNC: 61 NG/L
TROPONIN T SERPL HS-MCNC: 62 NG/L
VARIANT LYMPHS BLD QL SMEAR: PRESENT
VENTRICULAR RATE- MUSE: 78 BPM
WBC # BLD AUTO: 5.4 10E3/UL (ref 4–11)

## 2025-02-18 PROCEDURE — 250N000009 HC RX 250: Performed by: HOSPITALIST

## 2025-02-18 PROCEDURE — 82803 BLOOD GASES ANY COMBINATION: CPT

## 2025-02-18 PROCEDURE — 999N000208 ECHOCARDIOGRAM COMPLETE

## 2025-02-18 PROCEDURE — 80048 BASIC METABOLIC PNL TOTAL CA: CPT | Performed by: EMERGENCY MEDICINE

## 2025-02-18 PROCEDURE — 258N000003 HC RX IP 258 OP 636

## 2025-02-18 PROCEDURE — 87637 SARSCOV2&INF A&B&RSV AMP PRB: CPT | Performed by: EMERGENCY MEDICINE

## 2025-02-18 PROCEDURE — 99207 PR NO BILLABLE SERVICE THIS VISIT: CPT | Performed by: HOSPITALIST

## 2025-02-18 PROCEDURE — 93005 ELECTROCARDIOGRAM TRACING: CPT

## 2025-02-18 PROCEDURE — 999N000157 HC STATISTIC RCP TIME EA 10 MIN

## 2025-02-18 PROCEDURE — 99291 CRITICAL CARE FIRST HOUR: CPT | Mod: 25

## 2025-02-18 PROCEDURE — 71045 X-RAY EXAM CHEST 1 VIEW: CPT

## 2025-02-18 PROCEDURE — 96366 THER/PROPH/DIAG IV INF ADDON: CPT

## 2025-02-18 PROCEDURE — 250N000013 HC RX MED GY IP 250 OP 250 PS 637: Performed by: HOSPITALIST

## 2025-02-18 PROCEDURE — 99223 1ST HOSP IP/OBS HIGH 75: CPT | Mod: AI | Performed by: HOSPITALIST

## 2025-02-18 PROCEDURE — 99291 CRITICAL CARE FIRST HOUR: CPT

## 2025-02-18 PROCEDURE — 87040 BLOOD CULTURE FOR BACTERIA: CPT | Performed by: EMERGENCY MEDICINE

## 2025-02-18 PROCEDURE — 84145 PROCALCITONIN (PCT): CPT | Performed by: HOSPITALIST

## 2025-02-18 PROCEDURE — 96375 TX/PRO/DX INJ NEW DRUG ADDON: CPT

## 2025-02-18 PROCEDURE — 71046 X-RAY EXAM CHEST 2 VIEWS: CPT

## 2025-02-18 PROCEDURE — 255N000002 HC RX 255 OP 636

## 2025-02-18 PROCEDURE — 36415 COLL VENOUS BLD VENIPUNCTURE: CPT | Performed by: EMERGENCY MEDICINE

## 2025-02-18 PROCEDURE — 96365 THER/PROPH/DIAG IV INF INIT: CPT

## 2025-02-18 PROCEDURE — 85007 BL SMEAR W/DIFF WBC COUNT: CPT | Performed by: EMERGENCY MEDICINE

## 2025-02-18 PROCEDURE — 82374 ASSAY BLOOD CARBON DIOXIDE: CPT | Performed by: EMERGENCY MEDICINE

## 2025-02-18 PROCEDURE — 93306 TTE W/DOPPLER COMPLETE: CPT | Mod: 26 | Performed by: INTERNAL MEDICINE

## 2025-02-18 PROCEDURE — C8929 TTE W OR WO FOL WCON,DOPPLER: HCPCS

## 2025-02-18 PROCEDURE — 82310 ASSAY OF CALCIUM: CPT | Performed by: EMERGENCY MEDICINE

## 2025-02-18 PROCEDURE — 85014 HEMATOCRIT: CPT | Performed by: EMERGENCY MEDICINE

## 2025-02-18 PROCEDURE — 250N000013 HC RX MED GY IP 250 OP 250 PS 637: Performed by: EMERGENCY MEDICINE

## 2025-02-18 PROCEDURE — 36415 COLL VENOUS BLD VENIPUNCTURE: CPT

## 2025-02-18 PROCEDURE — 83880 ASSAY OF NATRIURETIC PEPTIDE: CPT | Performed by: EMERGENCY MEDICINE

## 2025-02-18 PROCEDURE — 84484 ASSAY OF TROPONIN QUANT: CPT | Performed by: EMERGENCY MEDICINE

## 2025-02-18 PROCEDURE — 120N000013 HC R&B IMCU

## 2025-02-18 PROCEDURE — 258N000003 HC RX IP 258 OP 636: Performed by: EMERGENCY MEDICINE

## 2025-02-18 PROCEDURE — 96361 HYDRATE IV INFUSION ADD-ON: CPT

## 2025-02-18 PROCEDURE — 83605 ASSAY OF LACTIC ACID: CPT

## 2025-02-18 PROCEDURE — 250N000011 HC RX IP 250 OP 636: Performed by: HOSPITALIST

## 2025-02-18 PROCEDURE — 99285 EMERGENCY DEPT VISIT HI MDM: CPT | Mod: 25

## 2025-02-18 RX ORDER — GUAIFENESIN/DEXTROMETHORPHAN 100-10MG/5
10 SYRUP ORAL EVERY 4 HOURS PRN
Status: DISCONTINUED | OUTPATIENT
Start: 2025-02-18 | End: 2025-02-21 | Stop reason: HOSPADM

## 2025-02-18 RX ORDER — ALBUTEROL SULFATE 5 MG/ML
2.5 SOLUTION RESPIRATORY (INHALATION)
Status: DISCONTINUED | OUTPATIENT
Start: 2025-02-18 | End: 2025-02-21 | Stop reason: HOSPADM

## 2025-02-18 RX ORDER — DEXAMETHASONE 4 MG/1
4 TABLET ORAL WEEKLY
Status: DISCONTINUED | OUTPATIENT
Start: 2025-02-23 | End: 2025-02-21 | Stop reason: HOSPADM

## 2025-02-18 RX ORDER — ACETAMINOPHEN 325 MG/1
975 TABLET ORAL ONCE
Status: COMPLETED | OUTPATIENT
Start: 2025-02-18 | End: 2025-02-18

## 2025-02-18 RX ORDER — FAMOTIDINE 20 MG/1
20 TABLET, FILM COATED ORAL DAILY
Status: DISCONTINUED | OUTPATIENT
Start: 2025-02-19 | End: 2025-02-21 | Stop reason: HOSPADM

## 2025-02-18 RX ORDER — LORAZEPAM 0.5 MG/1
0.5 TABLET ORAL EVERY 6 HOURS PRN
Status: DISCONTINUED | OUTPATIENT
Start: 2025-02-18 | End: 2025-02-21 | Stop reason: HOSPADM

## 2025-02-18 RX ORDER — MIRTAZAPINE 15 MG/1
15 TABLET, FILM COATED ORAL AT BEDTIME
Status: DISCONTINUED | OUTPATIENT
Start: 2025-02-18 | End: 2025-02-19

## 2025-02-18 RX ORDER — OSELTAMIVIR PHOSPHATE 30 MG/1
30 CAPSULE ORAL 2 TIMES DAILY
Status: DISCONTINUED | OUTPATIENT
Start: 2025-02-18 | End: 2025-02-21 | Stop reason: HOSPADM

## 2025-02-18 RX ORDER — ACETAMINOPHEN 325 MG/1
650 TABLET ORAL EVERY 4 HOURS PRN
Status: DISCONTINUED | OUTPATIENT
Start: 2025-02-18 | End: 2025-02-21 | Stop reason: HOSPADM

## 2025-02-18 RX ORDER — FAMOTIDINE 20 MG/1
40 TABLET, FILM COATED ORAL DAILY
Status: DISCONTINUED | OUTPATIENT
Start: 2025-02-18 | End: 2025-02-18

## 2025-02-18 RX ORDER — SODIUM CHLORIDE, SODIUM LACTATE, POTASSIUM CHLORIDE, CALCIUM CHLORIDE 600; 310; 30; 20 MG/100ML; MG/100ML; MG/100ML; MG/100ML
INJECTION, SOLUTION INTRAVENOUS CONTINUOUS
Status: DISCONTINUED | OUTPATIENT
Start: 2025-02-18 | End: 2025-02-18

## 2025-02-18 RX ORDER — ASPIRIN 81 MG/1
81 TABLET, CHEWABLE ORAL DAILY
Status: DISCONTINUED | OUTPATIENT
Start: 2025-02-19 | End: 2025-02-21 | Stop reason: HOSPADM

## 2025-02-18 RX ORDER — CEFTRIAXONE 2 G/1
2 INJECTION, POWDER, FOR SOLUTION INTRAMUSCULAR; INTRAVENOUS EVERY 24 HOURS
Status: DISCONTINUED | OUTPATIENT
Start: 2025-02-18 | End: 2025-02-21 | Stop reason: HOSPADM

## 2025-02-18 RX ORDER — ESCITALOPRAM OXALATE 5 MG/1
5 TABLET ORAL DAILY
Status: DISCONTINUED | OUTPATIENT
Start: 2025-02-19 | End: 2025-02-21 | Stop reason: HOSPADM

## 2025-02-18 RX ORDER — ACETAMINOPHEN 650 MG/1
650 SUPPOSITORY RECTAL EVERY 4 HOURS PRN
Status: DISCONTINUED | OUTPATIENT
Start: 2025-02-18 | End: 2025-02-21 | Stop reason: HOSPADM

## 2025-02-18 RX ORDER — ONDANSETRON 2 MG/ML
4 INJECTION INTRAMUSCULAR; INTRAVENOUS EVERY 6 HOURS PRN
Status: DISCONTINUED | OUTPATIENT
Start: 2025-02-18 | End: 2025-02-21 | Stop reason: HOSPADM

## 2025-02-18 RX ORDER — IPRATROPIUM BROMIDE AND ALBUTEROL SULFATE 2.5; .5 MG/3ML; MG/3ML
3 SOLUTION RESPIRATORY (INHALATION)
Status: DISCONTINUED | OUTPATIENT
Start: 2025-02-18 | End: 2025-02-21 | Stop reason: HOSPADM

## 2025-02-18 RX ORDER — LIDOCAINE 40 MG/G
CREAM TOPICAL
Status: DISCONTINUED | OUTPATIENT
Start: 2025-02-18 | End: 2025-02-21 | Stop reason: HOSPADM

## 2025-02-18 RX ORDER — AZITHROMYCIN MONOHYDRATE 500 MG/5ML
500 INJECTION, POWDER, LYOPHILIZED, FOR SOLUTION INTRAVENOUS EVERY 24 HOURS
Status: COMPLETED | OUTPATIENT
Start: 2025-02-18 | End: 2025-02-18

## 2025-02-18 RX ORDER — ONDANSETRON 4 MG/1
4 TABLET, ORALLY DISINTEGRATING ORAL EVERY 6 HOURS PRN
Status: DISCONTINUED | OUTPATIENT
Start: 2025-02-18 | End: 2025-02-21 | Stop reason: HOSPADM

## 2025-02-18 RX ORDER — AMOXICILLIN 250 MG
2 CAPSULE ORAL 2 TIMES DAILY PRN
Status: DISCONTINUED | OUTPATIENT
Start: 2025-02-18 | End: 2025-02-21 | Stop reason: HOSPADM

## 2025-02-18 RX ORDER — METHIMAZOLE 5 MG/1
2.5 TABLET ORAL EVERY EVENING
Status: DISCONTINUED | OUTPATIENT
Start: 2025-02-18 | End: 2025-02-21 | Stop reason: HOSPADM

## 2025-02-18 RX ORDER — OSELTAMIVIR PHOSPHATE 75 MG/1
75 CAPSULE ORAL ONCE
Status: COMPLETED | OUTPATIENT
Start: 2025-02-18 | End: 2025-02-18

## 2025-02-18 RX ORDER — ACYCLOVIR 400 MG/1
400 TABLET ORAL 2 TIMES DAILY
Status: DISCONTINUED | OUTPATIENT
Start: 2025-02-18 | End: 2025-02-21 | Stop reason: HOSPADM

## 2025-02-18 RX ORDER — CALCIUM CARBONATE 500 MG/1
1000 TABLET, CHEWABLE ORAL 4 TIMES DAILY PRN
Status: DISCONTINUED | OUTPATIENT
Start: 2025-02-18 | End: 2025-02-21 | Stop reason: HOSPADM

## 2025-02-18 RX ORDER — AMOXICILLIN 250 MG
1 CAPSULE ORAL 2 TIMES DAILY PRN
Status: DISCONTINUED | OUTPATIENT
Start: 2025-02-18 | End: 2025-02-21 | Stop reason: HOSPADM

## 2025-02-18 RX ADMIN — METHIMAZOLE 2.5 MG: 5 TABLET ORAL at 20:51

## 2025-02-18 RX ADMIN — ACETAMINOPHEN 975 MG: 325 TABLET, FILM COATED ORAL at 04:20

## 2025-02-18 RX ADMIN — SODIUM CHLORIDE 1000 ML: 9 INJECTION, SOLUTION INTRAVENOUS at 15:49

## 2025-02-18 RX ADMIN — PERFLUTREN 10 ML: 6.52 INJECTION, SUSPENSION INTRAVENOUS at 13:22

## 2025-02-18 RX ADMIN — FAMOTIDINE 40 MG: 20 TABLET, FILM COATED ORAL at 18:47

## 2025-02-18 RX ADMIN — IPRATROPIUM BROMIDE AND ALBUTEROL SULFATE 3 ML: .5; 3 SOLUTION RESPIRATORY (INHALATION) at 06:52

## 2025-02-18 RX ADMIN — CEFTRIAXONE SODIUM 2 G: 2 INJECTION, POWDER, FOR SOLUTION INTRAMUSCULAR; INTRAVENOUS at 08:26

## 2025-02-18 RX ADMIN — AZITHROMYCIN MONOHYDRATE 500 MG: 500 INJECTION, POWDER, LYOPHILIZED, FOR SOLUTION INTRAVENOUS at 09:16

## 2025-02-18 RX ADMIN — IPRATROPIUM BROMIDE AND ALBUTEROL SULFATE 3 ML: .5; 3 SOLUTION RESPIRATORY (INHALATION) at 10:49

## 2025-02-18 RX ADMIN — SODIUM CHLORIDE, POTASSIUM CHLORIDE, SODIUM LACTATE AND CALCIUM CHLORIDE: 600; 310; 30; 20 INJECTION, SOLUTION INTRAVENOUS at 05:29

## 2025-02-18 RX ADMIN — OSELTAMAVIR PHOSPHATE 30 MG: 30 CAPSULE ORAL at 20:50

## 2025-02-18 RX ADMIN — OSELTAMIVIR PHOSPHATE 75 MG: 75 CAPSULE ORAL at 05:29

## 2025-02-18 RX ADMIN — ACETAMINOPHEN 650 MG: 650 SUPPOSITORY RECTAL at 12:36

## 2025-02-18 RX ADMIN — SODIUM CHLORIDE, SODIUM LACTATE, POTASSIUM CHLORIDE, AND CALCIUM CHLORIDE 1000 ML: .6; .31; .03; .02 INJECTION, SOLUTION INTRAVENOUS at 04:17

## 2025-02-18 RX ADMIN — ACYCLOVIR 400 MG: 400 TABLET ORAL at 20:50

## 2025-02-18 RX ADMIN — SODIUM CHLORIDE 250 ML: 9 INJECTION, SOLUTION INTRAVENOUS at 15:08

## 2025-02-18 RX ADMIN — SODIUM CHLORIDE 250 ML: 9 INJECTION, SOLUTION INTRAVENOUS at 13:19

## 2025-02-18 ASSESSMENT — COLUMBIA-SUICIDE SEVERITY RATING SCALE - C-SSRS
6. HAVE YOU EVER DONE ANYTHING, STARTED TO DO ANYTHING, OR PREPARED TO DO ANYTHING TO END YOUR LIFE?: NO
2. HAVE YOU ACTUALLY HAD ANY THOUGHTS OF KILLING YOURSELF IN THE PAST MONTH?: NO
1. IN THE PAST MONTH, HAVE YOU WISHED YOU WERE DEAD OR WISHED YOU COULD GO TO SLEEP AND NOT WAKE UP?: NO

## 2025-02-18 ASSESSMENT — ACTIVITIES OF DAILY LIVING (ADL)
ADLS_ACUITY_SCORE: 55
ADLS_ACUITY_SCORE: 55
ADLS_ACUITY_SCORE: 59
ADLS_ACUITY_SCORE: 62
ADLS_ACUITY_SCORE: 55
ADLS_ACUITY_SCORE: 55
ADLS_ACUITY_SCORE: 59
ADLS_ACUITY_SCORE: 55
ADLS_ACUITY_SCORE: 62
ADLS_ACUITY_SCORE: 62
ADLS_ACUITY_SCORE: 55
ADLS_ACUITY_SCORE: 62
ADLS_ACUITY_SCORE: 55

## 2025-02-18 NOTE — CODE/RAPID RESPONSE
[facility]    RRT Note  2/18/2025   Time Called: 1101    Code Status: Full Code  I was called to evaluate Quiana Dunaway, who is a 89 year old female who was admitted on 2/18/2025 for acute cough, dyspnea, and fever, and is found to have acute hypoxemic respiratory failure and bilateral pneumonia due to acute Influenza B, as well as elevated Troponin. PMH includes multiple Myeloma on chemotherapy treatment, as well as high grade AV block, status post pacemaker placement, and paroxysmal atrial fibrillation, among others .    Assessment & Plan   Acute respiratory distress 2/2 pneumonia and influenza B  I was paged to come evaluate Ms. Dunaway as part of a rapid response call for acute respiratory distress. Ms. Dunaway was originally admitted on 2/18/2025 with acute hypoxic respiratory failure secondary to bilateral pneumonia, and influenza B. She was initially given 1 liter of IV fluids and started on tamiflu as well as Ceftriaxone and Azithromycin.   Shortly before 1100, the nurse noted that she seemed to be more short of breath, tachypnic, with diffusely course breath sounds, prompting her to call an RRT.   Upon my arrival, Ms. Dunaway was laying on the cart and appears to be in acute distress. She is tachypnic and her oxygen requirements had increased from 3 to 9 liters. She is hemodynamically stable with SBP in the 140s+ and HR in the 70s-90s. She is noted to be febrile with a t-max of 103.1.   On exam, her lungs are diffusely course, with extensive upper airway secretions. NT suction was ordered. Her respiratory effort improved after this was completed. A chest x-ray was preformed, showing no acute airspace disease or effusion.   Her blood pressure is now down trending and a small 250 ml IV fluid bolus was given. If pressure remains low, plan to complete a NICOM assessment. If not fluid responsive, we may need to start pressors. Will obtain a lactic acid. Results of echocardiogram  are pending. At present, she denies any chest pain, shortness of breath, abdominal pain or nausea. Upon completion of the encounter, she appears to be much more comfortable. Current plan is once she is stable, to admit her to [facility].      INTERVENTIONS:  -Duo-neb  -NT suctioning  -Lactic acid  -STAT echocardiogram  -Antibiotics and Tamiflu  -Chest x-ray  -250 ml IV fluid bolus    Discussed with and defer further cares to Hospitalist Dr. Red.    Addendum 1600: Blood pressure dropped after completion of the first IVF bolus. A NICOM assessment was completed showing that she was fluid responsive. 1 liter of NS was ordered to be given over 2 hours. Lactic acid is 1.2.     Non-Invasive Cardiac Output Monitoring (NICOM)  SVI 41.7%  Assessment episode: 1  Indication for NICOM use: SBP < 90 mmHg  % Change SVI: >/= 10%  Fluid responsive: Yes   If Yes, Fluid volume ordered: 1000 ml        Laz Palmer NP  [facility]  Securely message with the Vocera Web Console (learn more here)  Text page via "[employer] Paging/Directory    Physical Exam   Vital Signs with Ranges:  Temp:  [100  F (37.8  C)-103.1  F (39.5  C)] 103.1  F (39.5  C)  Pulse:  [65-98] 98  Resp:  [18-45] 45  BP: ()/(42-80) 92/42  SpO2:  [87 %-96 %] 96 %  No intake/output data recorded.  Physical Exam  Vitals reviewed.   Constitutional:       General: She is in acute distress.   Cardiovascular:      Rate and Rhythm: Regular rhythm. Tachycardia present.      Pulses: Normal pulses.      Heart sounds: Normal heart sounds.   Pulmonary:      Effort: Tachypnea present.      Breath sounds: Rhonchi present.   Abdominal:      General: Bowel sounds are normal.      Palpations: Abdomen is soft.      Tenderness: There is no abdominal tenderness.   Skin:     General: Skin is warm and dry.   Neurological:      General: No focal deficit present.      Mental Status: She is alert.   Psychiatric:         Attention and Perception: Attention normal.          Mood and Affect: Mood normal.         Speech: Speech normal.         Behavior: Behavior normal. Behavior is cooperative.         Thought Content: Thought content normal.         Cognition and Memory: Cognition normal.     Data   IMAGING: (X-ray/CT/MRI)   Recent Results (from the past 24 hours)   XR Chest 2 Views    Narrative    EXAM: XR CHEST 2 VIEWS  LOCATION: [facility]  DATE: 2/18/2025    INDICATION: cough hypoxic  COMPARISON: Chest x-ray from 03/13/2024.      Impression    IMPRESSION:   1.  Pulmonary venous congestion and mild increased lower lung interstitial markings, the latter may reflect pulmonary edema.  2.  No pleural effusion or pneumothorax.  3.  Normal heart size with implanted left subclavian dual-lead pacemaker.   XR Chest Port 1 View    Narrative    EXAM: XR CHEST PORT 1 VIEW  LOCATION: [facility]  DATE: 2/18/2025    INDICATION: Shortness of breath.  COMPARISON: Chest x-ray 2/18/2025.      Impression    IMPRESSION:   No acute airspace disease identified. Stable left chest pacer. Normal cardiac silhouette. No effusion or pneumothorax identified.       CBC with Diff:  Recent Labs   Lab Test 02/18/25  0405   WBC 5.4   HGB 9.9*   MCV 89   *      Absolute Retic (10e9/L)   Date Value   07/12/2017 65.3     % Retic (%)   Date Value   07/12/2017 2.0       Comprehensive Metabolic Panel:  Recent Labs   Lab 02/18/25  0405      POTASSIUM 4.1   CHLORIDE 99   CO2 25   ANIONGAP 13   *   BUN 19.8   CR 0.77   GFRESTIMATED 74   HUMBERTO 8.3*         Time Spent on this Encounter .  CRITICAL CARE TIME  I spent 60 minutes of critical care time on the unit/floor managing the care of Quiana Dunaway. Upon evaluation, this patient had a high probability of imminent or life-threatening deterioration which required my direct attention, intervention, and personal management. 100% of my time was spent at the bedside counseling the patient and/or  coordinating care regarding services listed in this note.

## 2025-02-18 NOTE — PHARMACY-ADMISSION MEDICATION HISTORY
Pharmacist Admission Medication History    Admission medication history is complete. The information provided in this note is only as accurate as the sources available at the time of the update.    Information Source(s): Family member and CareEverywhere/SureScripts via phone    Pertinent Information: Family in room said to call Regi to review home meds. On 2 weeks off from Pomalidomide    Changes made to PTA medication list:  Added: None  Deleted: None  Changed: meclizine to scheduled. Darzalex to every month. Triamcinolone for mouth sore changed to not taking for now    Allergies reviewed with patient and updates made in EHR: yes    Medication History Completed By: Oral Kay RPH 2/18/2025 1:11 PM    PTA Med List   Medication Sig Note Last Dose/Taking    acyclovir (ZOVIRAX) 400 MG tablet Take 1 tablet (400 mg) by mouth 2 times daily Viral Prophylaxis.  2/17/2025 Morning    aspirin 81 MG chewable tablet Take 81 mg by mouth daily   2/17/2025 Morning    cyanocobalamin (VITAMIN B-12) 1000 MCG tablet Take 1 tablet (1,000 mcg) by mouth daily  2/17/2025 Morning    Daratumumab (DARZALEX IV) Inject into the vein every 28 days. Q monthly injection, given on Mondays at cancer infusion center  1/28/2025    dexAMETHasone (DECADRON) 4 MG tablet Take 1 tablet (4 mg) by mouth once a week 2/18/2025: Takes on sundays 2/16/2025    escitalopram (LEXAPRO) 5 MG tablet Take 1 tablet (5 mg) by mouth daily.  2/17/2025 Morning    famotidine (PEPCID) 40 MG tablet Take 1 tablet (40 mg) by mouth 2 times daily.  2/17/2025 Morning    furosemide (LASIX) 20 MG tablet Take once daily as needed for swelling or blood pressure greater than 170/100  Unknown    Loperamide HCl (IMODIUM A-D PO) Take 1 tablet by mouth as needed   Unknown    LORazepam (ATIVAN) 0.5 MG tablet Take 1 tablet (0.5 mg) by mouth every 6 hours as needed for anxiety.  Unknown    meclizine (ANTIVERT) 25 MG tablet Take 1 tablet (25 mg) by mouth 3 times daily as needed for  dizziness (Patient taking differently: Take 25 mg by mouth 2 times daily. And daily as needed for dizziness)  2/17/2025 Morning    methimazole (TAPAZOLE) 5 MG tablet Take 0.5 tablets (2.5 mg) by mouth daily. 2/18/2025: Takes in the evening 2/16/2025 Evening    mirtazapine (REMERON) 15 MG tablet Take 1 tablet (15 mg) by mouth at bedtime  2/17/2025 Morning    nitroglycerin (NITROSTAT) 0.4 MG sublingual tablet For chest pain place 1 tablet under the tongue every 5 minutes for 3 doses. If symptoms persist 5 minutes after 1st dose call 911.  Unknown    ondansetron (ZOFRAN) 8 MG tablet Take 1 tablet (8 mg) by mouth every 8 hours as needed for nausea  Unknown    potassium chloride will ER (KLOR-CON M10) 10 MEQ CR tablet Take 1 tablet (10 mEq) by mouth daily  2/17/2025 Morning    prochlorperazine (COMPAZINE) 10 MG tablet Take 1 tablet (10 mg) by mouth every 6 hours as needed (Nausea/Vomiting).  Unknown

## 2025-02-18 NOTE — ED NOTES
Pt changed to Oxymask from nasal cannula due to pt sating in mid-80s and pt being a mouth breather.  Pt now at 94% on Oxymask at 3L

## 2025-02-18 NOTE — PROGRESS NOTES
RRT called for respiratory assessment. Pt with course breath sounds and weak cough. NT suctioned right nare twice for moderate amount of  thick secretions. RT will continue to follow.     Marta De Santiago,  11:42 AM 02/18/25

## 2025-02-18 NOTE — TELEPHONE ENCOUNTER
Oral Chemotherapy Monitoring Program   Medication: Pomalyst  Rx: 2mg PO daily on days 1 through 14 of 28 day cycle   Auth #:  [id number]  Risk Category: adult female of non child bearing potential  Routine survey questions reviewed.   Rx to be Escribed to [facility]     Terrie Bowen [facility] Oncology Pharmacy Liaison  [phone]

## 2025-02-18 NOTE — PROGRESS NOTES
NICOM ASSESSMENT      Baseline Assessment         HR 70    SVI 32    CI 2.2    CO   3.5         HR 69    SVI 32    CI   2.2  CO   3.5        Dynamic Assessment          HR 69    SVI 30    CI 2.1    CO   3.3         HR 69    SVI 30    CI 2.1    CO   3.3        Change in SVI 41.7%     Stoney Palmer, ROSA MARIA notified

## 2025-02-18 NOTE — ED PROVIDER NOTES
Emergency Department Note      History of Present Illness     Chief Complaint   Influenza      HPI   Quiana Dunaway is a 89 year old female With a history of hypertension, multiple myeloma, sick sinus syndrome status post pacemaker, paroxysmal A-fib not anticoagulated, who presents with influenza symptoms.  The patient on Saturday developed cold symptoms including rhinorrhea and a minor cough.  Sunday she was feeling great.  Monday she began to have a sore throat and a dry cough they went to urgent care and were tested for strep which was negative as well as influenza which was positive.  She has been running a fever that she has been having some loose stools no blood.  Has not been eating much but has been able to keep down some Gatorade.  No abdominal pain.  Does have chronic shortness of breath due to multiple myeloma but has had worsening PORTILLO...  The patient tested positive for influenza yesterday at urgent care.    Independent Historian   None    Review of External Notes   Notes from Juaquin Allison nurse practitioner hematology.  This note was from January 28, 2025 for follow-up for the multiple myeloma.  Currently on Pomalyst, daratumumab monthly and dexamethasone 4 mg weekly.    Past Medical History     Medical History and Problem List   Past Medical History:   Diagnosis Date    Anxiety     HTN (hypertension)     Multiple myeloma not having achieved remission (H) 7/18/2017    Pacemaker     Pancytopenia (H) 6/28/2017    Paroxysmal atrial fibrillation (H)        Medications   acyclovir (ZOVIRAX) 400 MG tablet  aspirin 81 MG chewable tablet  cyanocobalamin (VITAMIN B-12) 1000 MCG tablet  Daratumumab (DARZALEX IV)  dexAMETHasone (DECADRON) 4 MG tablet  escitalopram (LEXAPRO) 5 MG tablet  famotidine (PEPCID) 40 MG tablet  furosemide (LASIX) 20 MG tablet  Loperamide HCl (IMODIUM A-D PO)  LORazepam (ATIVAN) 0.5 MG tablet  meclizine (ANTIVERT) 25 MG tablet  methimazole (TAPAZOLE) 5 MG tablet  mirtazapine (REMERON)  "15 MG tablet  nitroglycerin (NITROSTAT) 0.4 MG sublingual tablet  ondansetron (ZOFRAN) 8 MG tablet  pomalidomide (POMALYST) 2 MG capsule  potassium chloride will ER (KLOR-CON M10) 10 MEQ CR tablet  prochlorperazine (COMPAZINE) 10 MG tablet  triamcinolone (KENALOG) 0.1 % paste        Surgical History   Past Surgical History:   Procedure Laterality Date    ------------OTHER-------------      cataract eye surgery    BONE MARROW BIOPSY, BONE SPECIMEN, NEEDLE/TROCAR N/A 7/12/2017    Procedure: BIOPSY BONE MARROW;  BONE MARROW BIOPSY ;  Surgeon: Grace Vela MD;  Location:  GI    IMPLANT PACEMAKER  03/2017    AV block       Physical Exam     Patient Vitals for the past 24 hrs:   BP Temp Temp src Pulse Resp SpO2 Height Weight   02/18/25 0504 -- 100  F (37.8  C) -- -- -- -- -- --   02/18/25 0428 -- -- -- -- -- -- 1.549 m (5' 1\") 59 kg (130 lb)   02/18/25 0353 -- -- -- -- -- 94 % -- --   02/18/25 0352 -- -- -- -- -- 94 % -- --   02/18/25 0351 -- (!) 102.8  F (39.3  C) Oral -- 18 -- -- --   02/18/25 0350 138/51 -- -- 87 -- -- -- --     Physical Exam    Physical Exam   Constitutional:  Patient is oriented to person, place, and time. They appear well-developed and well-nourished.    HENT:   Mouth/Throat:   Oropharynx is clear and dry.   Eyes:    Conjunctivae normal and EOM are normal. Pupils are equal, round, and reactive to light.   Neck:    Normal range of motion.   Cardiovascular: Normal rate, regular rhythm and normal heart sounds.  Exam reveals no gallop and no friction rub.  No murmur heard.  Pulmonary/Chest:  Effort normal and breath sounds diminished.. Patient has no wheezes. Patient has no rales.   Abdominal:   Soft. Bowel sounds are normal. Patient exhibits no mass. There is no tenderness. There is no rebound and no guarding.   Musculoskeletal:  Normal range of motion. Patient exhibits no edema.   Neurological:   Patient is alert and oriented to person, place, and time. Patient is generally weak. No " cranial nerve deficit or sensory deficit. GCS 15  Skin:   Skin is warm and dry. No rash noted. No erythema.   Psychiatric:   Patient has a normal mood    Diagnostics     Lab Results   Labs Ordered and Resulted from Time of ED Arrival to Time of ED Departure   BASIC METABOLIC PANEL - Abnormal       Result Value    Sodium 137      Potassium 4.1      Chloride 99      Carbon Dioxide (CO2) 25      Anion Gap 13      Urea Nitrogen 19.8      Creatinine 0.77      GFR Estimate 74      Calcium 8.3 (*)     Glucose 112 (*)    INFLUENZA A/B, RSV AND SARS-COV2 PCR - Abnormal    Influenza A PCR Negative      Influenza B PCR Positive (*)     RSV PCR Negative      SARS CoV2 PCR Negative     TROPONIN T, HIGH SENSITIVITY - Abnormal    Troponin T, High Sensitivity 61 (*)    CBC WITH PLATELETS AND DIFFERENTIAL - Abnormal    WBC Count 5.4      RBC Count 3.48 (*)     Hemoglobin 9.9 (*)     Hematocrit 30.8 (*)     MCV 89      MCH 28.4      MCHC 32.1      RDW 17.3 (*)     Platelet Count 115 (*)    ISTAT GASES LACTATE VENOUS POCT - Abnormal    Lactic Acid POCT 1.5      Bicarbonate Venous POCT 26      O2 Sat, Venous POCT 64 (*)     pCO2 Venous POCT 40      pH Venous POCT 7.42      pO2 Venous POCT 33      Base Excess/Deficit (+/-) POCT 1.0     MANUAL DIFFERENTIAL - Abnormal    % Neutrophils 17      % Lymphocytes 37      % Monocytes 46      % Eosinophils 0      % Basophils 0      Absolute Neutrophils 0.9 (*)     Absolute Lymphocytes 2.0      Absolute Monocytes 2.5 (*)     Absolute Eosinophils 0.0      Absolute Basophils 0.0      RBC Morphology Confirmed RBC Indices      Platelet Assessment        Value: Automated Count Confirmed. Platelet morphology is normal.    Elliptocytes Slight (*)     RBC Fragments Slight (*)     Reactive Lymphocytes Present (*)    NT PROBNP INPATIENT   TROPONIN T, HIGH SENSITIVITY   BLOOD CULTURE   BLOOD CULTURE       Imaging   XR Chest 2 Views   Final Result   IMPRESSION:    1.  Pulmonary venous congestion and mild  increased lower lung interstitial markings, the latter may reflect pulmonary edema.   2.  No pleural effusion or pneumothorax.   3.  Normal heart size with implanted left subclavian dual-lead pacemaker.          EKG   ECG results from 02/18/25   EKG 12-lead, tracing only     Value    Systolic Blood Pressure     Diastolic Blood Pressure     Ventricular Rate 78    Atrial Rate 78    AL Interval     QRS Duration 134        QTc 515    P Axis 5    R AXIS -53    T Axis 79    Interpretation ECG      Ventricular-paced rhythm  Abnormal ECG  When compared with ECG of 10-Dec-2022 09:34,  Vent. rate has increased by  16 bpm       *Note: Due to a large number of results and/or encounters for the requested time period, some results have not been displayed. A complete set of results can be found in Results Review.       Independent Interpretation   CXR: Shows infiltrates versus edema no pneumothorax.    ED Course      Medications Administered   Medications   lactated ringers infusion ( Intravenous $New Bag 2/18/25 8624)   acetaminophen (TYLENOL) tablet 975 mg (975 mg Oral $Given 2/18/25 1620)   lactated ringers BOLUS 1,000 mL (1,000 mLs Intravenous $New Bag 2/18/25 0632)   oseltamivir (TAMIFLU) capsule 75 mg (75 mg Oral $Given 2/18/25 4229)       Procedures   Procedures     Discussion of Management   Admitting Hospitalist, Dr Shoemaker    ED Course        Additional Documentation  None    Medical Decision Making / Diagnosis     CMS Diagnoses: None    MIPS       None    [facility]   Quiana ANA Emelyn is a 89 year old female who presents to the emergency room with influenza generally weak shortness of breath worse than her baseline and not eating or drinking very much.  On initial evaluation she looked very dry dehydrated.  She was febrile.  She was not in any acute respiratory distress but was hypoxic on room air she was then placed on nasal cannula oxygen and her saturations have been normal on this.  She was not appreciably wheezy nor  did she have crackles.  Blood work was performed as well as imaging.  She is positive for influenza B.  Her lactic and white blood cell count are both normal.  Chest x-ray was read by radiology as having findings of more pulmonary edema.  I did add a BNP on.  She does not have any history of congestive heart failure but she does take Lasix when she notices ankle swelling and she has not noticed this recently.  She critic clinically looks dry and not fluid overloaded.  Her EKG shows a paced rhythm.  Her troponin is slightly elevated this is likely demand ischemia due to being hypoxic.  She did receive lactated Ringer's while awaiting her evaluation.  She also received Tylenol this did bring her temperature down.  At this time I will bring her in for her acute hypoxic respiratory failure secondary to influenza B.    Disposition   The patient was admitted to the hospital.     Diagnosis     ICD-10-CM    1. Acute respiratory failure with hypoxia (H)  J96.01       2. Elevated troponin  R79.89     likely demand ischemia      3. Influenza B  J10.1            Discharge Medications   New Prescriptions    No medications on file         MD Judi Thurston Michelle Ann, MD  02/18/25 0536

## 2025-02-18 NOTE — ED NOTES
"[facility]  ED Nurse Handoff Report    ED Chief complaint: Influenza      ED Diagnosis:   Final diagnoses:   Acute respiratory failure with hypoxia (H)       Code Status: Pending    Allergies:   Allergies   Allergen Reactions    Levaquin [Levofloxacin] Other (See Comments)     Tingling in feet after 1 dose on 8/7/19         Patient Story: Patient was diagnosed with influenza earlier today at . Daughter in law states patient is weak and is not eating or drinking. Currently being treated for multiple myeloma with oral chemotherapy.      Focused Assessment:  Pt A&OX4, relates feeling weak and not eating or drinking.  Admission temperature noted and Tylenol given.  93% on 3L NC due to sats in mid-eighties on admission.    Treatments and/or interventions provided: Fluids, labs, x-ray  Patient's response to treatments and/or interventions: Tolerating    To be done/followed up on inpatient unit:  Pending orders    Does this patient have any cognitive concerns?:  none    Activity level - Baseline/Home:  Walker  Activity Level - Current:   Stand with assist x2 and Walker    Patient's Preferred language: English   Needed?: No    Isolation: None  Infection: Not Applicable  Patient tested for COVID 19 prior to admission: YES  Bariatric?: No    Vital Signs:   Vitals:    02/18/25 0351 02/18/25 0352 02/18/25 0353 02/18/25 0428   BP:       Pulse:       Resp: 18      Temp: (!) 102.8  F (39.3  C)      TempSrc: Oral      SpO2:  94% 94%    Weight:    59 kg (130 lb)   Height:    1.549 m (5' 1\")       Cardiac Rhythm:     Was the PSS-3 completed:   Yes  What interventions are required if any?               Family Comments: Daughter at bedside.  OBS brochure/video discussed/provided to patient/family: No              Name of person given brochure if not patient: n/a              Relationship to patient: n/a    For the majority of the shift this patient's behavior was Green.   Behavioral interventions performed " were none.    ED NURSE PHONE NUMBER: [phone]

## 2025-02-18 NOTE — ED NOTES
RN noticed that patient vitals were not on monitor. Entered room to find patient trying to get out of bed, urinated on floor and self, tele cables removed, and O2 removed. Placed O2 on patient and sats were mid-upper 80's and respirations were 28.

## 2025-02-18 NOTE — ED TRIAGE NOTES
Patient was diagnosed with influenza earlier today at . Daughter in law states patient is weak and is not eating or drinking. Currently being treated for multiple myeloma with oral chemotherapy.      Triage Assessment (Adult)       Row Name 02/18/25 0350          Triage Assessment    Airway WDL WDL        Respiratory WDL    Respiratory WDL WDL        Skin Circulation/Temperature WDL    Skin Circulation/Temperature WDL X;all     Skin Temperature warm        Cardiac WDL    Cardiac WDL WDL

## 2025-02-18 NOTE — H&P
[facility]    History and Physical  Hospitalist       Date of Admission:  2/18/2025  Date of Service (when I saw the patient): 02/18/25    ASSESSMENT  Quiana Dunaway is a markedly pleasant 89 year old woman with past medical history that is most notable for Multiple Myeloma on chemotherapy treatment, as well as high grade AV block, status post pacemaker placement, and paroxysmal atrial fibrillation, among others; who presents with acute cough, dyspnea, and fever, and is found to have acute hypoxemic respiratory failure and bilateral pneumonia due to acute Influenza B, as well as elevated Troponin.    PLAN     Acute hypoxemic respiratory failure and bilateral pneumonia due to acute Influenza B: Of note, Ms. Dunaway previously underwent ([employer]) pacemaker placement in 2017 for high grade symptomatic AV block. She also has a history of chronic paroxysmal atrial fibrillation, having been offered and declined systemic anticoagulation. TTE in 2020 showed preserved LVEF and Lexiscan in that year showed no evidence of inducible ischemia.    Now, 02/18/25 she presents for evaluation of acute cough, dyspnea, and fever. In the ED, she requires 2 L O2 to keep sats above 90%. She has a low grade fever without tachycardia or hypotension. WBC and Lactate are normal. Troponin T is 61. EKG shows paced rhythm. Influenza B antigen is positive; Influenza A, COVID and RSV tests are negative. CXR shows pulmonary venous congestion and mild increased lower lung interstitial markings, without pleural effusion or pneumothorax. Normal heart size is noted with her implanted left subclavian dual-lead pacemaker in place.    Overall, her symptoms seem consistent with acute hypoxemic respiratory failure and bilateral pneumonia due to acute Influenza B. We will monitor closely for signs of developing concomitant bacterial pneumonia which seems for now at least to be less likely.       -- Inpatient. Precautions  ordered. Tamiflu continued.    -- Procalcitonin added on. Pending results would have a low threshold to add empiric Ceftriaxone and Azithromycin. Follow up blood cultures.    -- Duonebs scheduled q4 hours, Albuterol nebs every 2 hours as needed. Resume home steroids when verified.  Monitor oxygenation and wean O2 as able. Aggressive IS. Robitussin ordered as needed for cough.     -- Repeat CBC and BMP 2/19/25. SW consulted for disposition planning.    Elevated Troponin: She could have acute diastolic CHF exacerbation causing acute pulmonary edema, due to acute infection, leading to demand ischemia. By exam however she appears hypovolemic. We will rule out ACS.      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  She is currently prescribed Pomalyst 2 mg (14 days on, 14 days off), Daratumumab monthly, and Dexamethasone 4 mg weekly       -- Monitor while hospitalized. Repeat CBC 2/19/25     -- Resume home medications when verified    Recent Labs   Lab Test 02/18/25  0405 01/28/25  0912 12/31/24  1226   HGB 9.9* 10.5* 10.6*     Hypertension: It seems she is prescribed prn lasix for leg swelling as an outpatient. Noted    Chronic anxiety: Resume home medications when verified.    I have spent 75 minutes on the date of service doing chart review, history, examination, documentation, and further activities per the note.    Chief Complaint   Cough    History is obtained from the patient , her daughter in law at the bedside, and the ED physician whom I have spoken with    History of Present Illness   Quiana Dunaway is a markedly pleasant 89 year old woman who presents with acute cough. This started 4 days ago (2/15), mild at first, with rhinorrhea; the symptoms have become constant and progressively severe, with intermittent sputum production, as well as sore throat, and dyspnea especially when laying  "down. She was seen in further evaluation on 2/17 in an urgent care conic and tested positive for Influenza (negative for Strep reportedly). She returns for further evaluation tonight for worsening cough and dyspnea, as well as fever and progressive fatigue and low energy levels. She also endorses low appetite and PO intake in the past day. She otherwise denies chest pain, palpitations, leg swelling, vomiting, diarrhea, or abdominal pain, or other acute complaints.    In the ED, T 100.0, 138/51, pulse 87. She requires 2 L NC to keep sats above 90% in the ED.    CBC and BMP were notable for HGB 9.9, Ca 8.3, Glucose 112, otherwise were within the normal reference range. WBC was 5.4. Lactate was 1.5. VBG showed 7.42/40. Troponin T was 61. EKG showed paced rhythm. Influenza B antigen was positive; Influenza A, COVID and RSV tests were negative. Blood cultures were sent.    She was given Tamiflu, IV fluid, and Tylenol in the ED.    Recent Results (from the past 24 hours)   XR Chest 2 Views    Narrative    EXAM: XR CHEST 2 VIEWS  LOCATION: [facility]  DATE: 2/18/2025    INDICATION: cough hypoxic  COMPARISON: Chest x-ray from 03/13/2024.      Impression    IMPRESSION:   1.  Pulmonary venous congestion and mild increased lower lung interstitial markings, the latter may reflect pulmonary edema.  2.  No pleural effusion or pneumothorax.  3.  Normal heart size with implanted left subclavian dual-lead pacemaker.       PHYSICAL EXAM  Blood pressure 138/51, pulse 87, temperature 100  F (37.8  C), resp. rate 18, height 1.549 m (5' 1\"), weight 59 kg (130 lb), SpO2 94%, not currently breastfeeding.  Constitutional: Alert and oriented to person, place and time; no apparent distress; appears frail with dry mucus membranes  Respiratory: lungs have rales to auscultation bilaterally without wheezes  Cardiovascular: regular S1 S2  GI: abdomen soft non tender non distended bowel sounds positive  Musculoskeletal: no " clubbing, cyanosis or edema  Neurologic: extra-ocular muscles intact; moves all four extremities     DVT Prophylaxis: Pneumatic Compression Devices  Code Status: Full Code, discussed and confirmed with her and her family    Medically Ready for Discharge: Anticipated in 2-4 Days       Mohit Shoemaker MD, MD    Past Medical History    I have reviewed this patient's medical history and updated it with pertinent information if needed.   Past Medical History:   Diagnosis Date    Anxiety     HTN (hypertension)     Multiple myeloma not having achieved remission (H) 7/18/2017    Pacemaker     Placed due to symptomatic second degree AV Block    Pancytopenia (H) 6/28/2017    Paroxysmal atrial fibrillation (H)        Past Surgical History   I have reviewed this patient's surgical history and updated it with pertinent information if needed.  Past Surgical History:   Procedure Laterality Date    ------------OTHER-------------      cataract eye surgery    BONE MARROW BIOPSY, BONE SPECIMEN, NEEDLE/TROCAR N/A 7/12/2017    Procedure: BIOPSY BONE MARROW;  BONE MARROW BIOPSY ;  Surgeon: Grace Vela MD;  Location:  GI    IMPLANT PACEMAKER  03/2017    AV block       Prior to Admission Medications   Prior to Admission Medications   Prescriptions Last Dose Informant Patient Reported? Taking?   Daratumumab (DARZALEX IV)  Daughter Yes No   Sig: Q weekly injection, given on Mondays at cancer infusion center   LORazepam (ATIVAN) 0.5 MG tablet   No No   Sig: Take 1 tablet (0.5 mg) by mouth every 6 hours as needed for anxiety.   Loperamide HCl (IMODIUM A-D PO)  Daughter Yes No   Sig: Take 1 tablet by mouth as needed    acyclovir (ZOVIRAX) 400 MG tablet   No No   Sig: Take 1 tablet (400 mg) by mouth 2 times daily Viral Prophylaxis.   aspirin 81 MG chewable tablet  Daughter Yes No   Sig: Take 81 mg by mouth daily    cyanocobalamin (VITAMIN B-12) 1000 MCG tablet   No No   Sig: Take 1 tablet (1,000 mcg) by mouth daily    dexAMETHasone (DECADRON) 4 MG tablet   No No   Sig: Take 1 tablet (4 mg) by mouth once a week   escitalopram (LEXAPRO) 5 MG tablet   No No   Sig: Take 1 tablet (5 mg) by mouth daily.   famotidine (PEPCID) 40 MG tablet   No No   Sig: Take 1 tablet (40 mg) by mouth 2 times daily.   furosemide (LASIX) 20 MG tablet   No No   Sig: Take once daily as needed for swelling or blood pressure greater than 170/100   meclizine (ANTIVERT) 25 MG tablet   No No   Sig: Take 1 tablet (25 mg) by mouth 3 times daily as needed for dizziness   methimazole (TAPAZOLE) 5 MG tablet   No No   Sig: Take 0.5 tablets (2.5 mg) by mouth daily.   mirtazapine (REMERON) 15 MG tablet   No No   Sig: Take 1 tablet (15 mg) by mouth at bedtime   nitroglycerin (NITROSTAT) 0.4 MG sublingual tablet  Daughter No No   Sig: For chest pain place 1 tablet under the tongue every 5 minutes for 3 doses. If symptoms persist 5 minutes after 1st dose call 911.   ondansetron (ZOFRAN) 8 MG tablet   No No   Sig: Take 1 tablet (8 mg) by mouth every 8 hours as needed for nausea   pomalidomide (POMALYST) 2 MG capsule   No No   Sig: Take 1 capsule (2 mg) by mouth daily for 14 days Swallow whole, do NOT break, crush, chew or open capsule. Take on days 1-14 of repeated 28 day cycles.   potassium chloride will ER (KLOR-CON M10) 10 MEQ CR tablet   No No   Sig: Take 1 tablet (10 mEq) by mouth daily   prochlorperazine (COMPAZINE) 10 MG tablet   No No   Sig: Take 1 tablet (10 mg) by mouth every 6 hours as needed (Nausea/Vomiting).   triamcinolone (KENALOG) 0.1 % paste   No No   Sig: Take by mouth 2 times daily.      Facility-Administered Medications: None     Allergies   Allergies   Allergen Reactions    Levaquin [Levofloxacin] Other (See Comments)     Tingling in feet after 1 dose on 8/7/19         Social History   I have reviewed this patient's social history and updated it with pertinent information if needed. Quiana Dunaway  reports that she has never smoked. She has never  used smokeless tobacco. She reports that she does not drink alcohol and does not use drugs.    Family History   Family history assessed and, except as above, is non-contributory.    Family History   Problem Relation Age of Onset    Unknown/Adopted Mother     Unknown/Adopted Father        Review of Systems   The 10 point Review of Systems is negative other than noted in the HPI or here.     Primary Care Physician   César Chung    Data   Labs Ordered and Resulted from Time of ED Arrival to Time of ED Departure   BASIC METABOLIC PANEL - Abnormal       Result Value    Sodium 137      Potassium 4.1      Chloride 99      Carbon Dioxide (CO2) 25      Anion Gap 13      Urea Nitrogen 19.8      Creatinine 0.77      GFR Estimate 74      Calcium 8.3 (*)     Glucose 112 (*)    INFLUENZA A/B, RSV AND SARS-COV2 PCR - Abnormal    Influenza A PCR Negative      Influenza B PCR Positive (*)     RSV PCR Negative      SARS CoV2 PCR Negative     TROPONIN T, HIGH SENSITIVITY - Abnormal    Troponin T, High Sensitivity 61 (*)    CBC WITH PLATELETS AND DIFFERENTIAL - Abnormal    WBC Count 5.4      RBC Count 3.48 (*)     Hemoglobin 9.9 (*)     Hematocrit 30.8 (*)     MCV 89      MCH 28.4      MCHC 32.1      RDW 17.3 (*)     Platelet Count 115 (*)    ISTAT GASES LACTATE VENOUS POCT - Abnormal    Lactic Acid POCT 1.5      Bicarbonate Venous POCT 26      O2 Sat, Venous POCT 64 (*)     pCO2 Venous POCT 40      pH Venous POCT 7.42      pO2 Venous POCT 33      Base Excess/Deficit (+/-) POCT 1.0     MANUAL DIFFERENTIAL - Abnormal    % Neutrophils 17      % Lymphocytes 37      % Monocytes 46      % Eosinophils 0      % Basophils 0      Absolute Neutrophils 0.9 (*)     Absolute Lymphocytes 2.0      Absolute Monocytes 2.5 (*)     Absolute Eosinophils 0.0      Absolute Basophils 0.0      RBC Morphology Confirmed RBC Indices      Platelet Assessment        Value: Automated Count Confirmed. Platelet morphology is normal.    Elliptocytes Slight (*)      RBC Fragments Slight (*)     Reactive Lymphocytes Present (*)    NT PROBNP INPATIENT   TROPONIN T, HIGH SENSITIVITY   BLOOD CULTURE   BLOOD CULTURE       Data reviewed today:  I personally reviewed the EKG tracing showing paced rhythm and the chest x-ray image(s) showing bilateral patchy opacifications .

## 2025-02-18 NOTE — PROGRESS NOTES
Brief, no charge note. Patient admitted by my colleague Dr. Shoemaker today.    Quiana Dunaway is a 89 year old woman with PMH most notable for Multiple Myeloma (on chemotherapy), h/o high grade AV block (s/p PM), paroxysmal atrial fibrillation (not on anticoagulation) who presents with acute cough, dyspnea, and fever, and is found to have acute hypoxemic respiratory failure and bilateral pneumonia due to acute Influenza B, as well as elevated Troponin.    Patient seen and examined in ED with her daughter-in-law present by bedside    - On 3 L oxygen mask; T max 102.8  - BNP 3598; procalcitonin 0.77; troponin T 61--- 62, denies any chest pain    On exam: alert and oriented X 3; having coarse cough  Lungs: bilateral wheezes +  No edema    -Will continue with Tamiflu, will also add to Rocephin and azithromycin for likely superimposed pneumonia  -continue duo nebs Q4 hours  -will have PRN cough medication  -encourage incentive spirometry; wean oxygen as able  - clinically does not look fluid overloaded; will discontinue IV fluids, encourage PO; hold PTA Lasix for now and monitor volume status closely  -ECHO pending    -Will complete PTA med rec when verified by pharmacy    Rest care plan per H&P by Dr. Shoemaker    Care plan discussed with patient and her daughter-in-law present in room

## 2025-02-19 ENCOUNTER — APPOINTMENT (OUTPATIENT)
Dept: GENERAL RADIOLOGY | Facility: CLINIC | Age: 89
DRG: 193 | End: 2025-02-19
Attending: HOSPITALIST
Payer: MEDICARE

## 2025-02-19 DIAGNOSIS — I50.32 CHRONIC DIASTOLIC CONGESTIVE HEART FAILURE (H): Primary | ICD-10-CM

## 2025-02-19 LAB
ANION GAP SERPL CALCULATED.3IONS-SCNC: 8 MMOL/L (ref 7–15)
BASE EXCESS BLDV CALC-SCNC: 0.4 MMOL/L (ref -3–3)
BASOPHILS # BLD AUTO: 0 10E3/UL (ref 0–0.2)
BASOPHILS NFR BLD AUTO: 1 %
BUN SERPL-MCNC: 16.7 MG/DL (ref 8–23)
CALCIUM SERPL-MCNC: 7.5 MG/DL (ref 8.8–10.4)
CHLORIDE SERPL-SCNC: 106 MMOL/L (ref 98–107)
CREAT SERPL-MCNC: 0.64 MG/DL (ref 0.51–0.95)
DACRYOCYTES BLD QL SMEAR: SLIGHT
EGFRCR SERPLBLD CKD-EPI 2021: 85 ML/MIN/1.73M2
ELLIPTOCYTES BLD QL SMEAR: ABNORMAL
EOSINOPHIL # BLD AUTO: 0 10E3/UL (ref 0–0.7)
EOSINOPHIL NFR BLD AUTO: 0 %
ERYTHROCYTE [DISTWIDTH] IN BLOOD BY AUTOMATED COUNT: 18 % (ref 10–15)
FRAGMENTS BLD QL SMEAR: SLIGHT
GLUCOSE SERPL-MCNC: 90 MG/DL (ref 70–99)
HCO3 BLDV-SCNC: 25 MMOL/L (ref 21–28)
HCO3 SERPL-SCNC: 25 MMOL/L (ref 22–29)
HCT VFR BLD AUTO: 23.9 % (ref 35–47)
HGB BLD-MCNC: 7.8 G/DL (ref 11.7–15.7)
IMM GRANULOCYTES # BLD: 0 10E3/UL
IMM GRANULOCYTES NFR BLD: 1 %
LYMPHOCYTES # BLD AUTO: 0.5 10E3/UL (ref 0.8–5.3)
LYMPHOCYTES NFR BLD AUTO: 17 %
MCH RBC QN AUTO: 28.8 PG (ref 26.5–33)
MCHC RBC AUTO-ENTMCNC: 32.6 G/DL (ref 31.5–36.5)
MCV RBC AUTO: 88 FL (ref 78–100)
MONOCYTES # BLD AUTO: 0.8 10E3/UL (ref 0–1.3)
MONOCYTES NFR BLD AUTO: 25 %
NEUTROPHILS # BLD AUTO: 1.8 10E3/UL (ref 1.6–8.3)
NEUTROPHILS NFR BLD AUTO: 57 %
NRBC # BLD AUTO: 0 10E3/UL
NRBC BLD AUTO-RTO: 0 /100
O2/TOTAL GAS SETTING VFR VENT: 2 %
OXYHGB MFR BLDV: 88 % (ref 70–75)
PCO2 BLDV: 39 MM HG (ref 40–50)
PH BLDV: 7.41 [PH] (ref 7.32–7.43)
PLAT MORPH BLD: ABNORMAL
PLATELET # BLD AUTO: 110 10E3/UL (ref 150–450)
PO2 BLDV: 54 MM HG (ref 25–47)
POTASSIUM SERPL-SCNC: 3.6 MMOL/L (ref 3.4–5.3)
RBC # BLD AUTO: 2.71 10E6/UL (ref 3.8–5.2)
RBC MORPH BLD: ABNORMAL
SAO2 % BLDV: 89.1 % (ref 70–75)
SODIUM SERPL-SCNC: 139 MMOL/L (ref 135–145)
TOXIC GRANULES BLD QL SMEAR: PRESENT
WBC # BLD AUTO: 3.2 10E3/UL (ref 4–11)

## 2025-02-19 PROCEDURE — 250N000009 HC RX 250: Performed by: HOSPITALIST

## 2025-02-19 PROCEDURE — 250N000011 HC RX IP 250 OP 636: Performed by: HOSPITALIST

## 2025-02-19 PROCEDURE — 71045 X-RAY EXAM CHEST 1 VIEW: CPT

## 2025-02-19 PROCEDURE — 999N000040 HC STATISTIC CONSULT NO CHARGE VASC ACCESS

## 2025-02-19 PROCEDURE — 94640 AIRWAY INHALATION TREATMENT: CPT | Mod: 76

## 2025-02-19 PROCEDURE — 999N000157 HC STATISTIC RCP TIME EA 10 MIN

## 2025-02-19 PROCEDURE — 99232 SBSQ HOSP IP/OBS MODERATE 35: CPT | Performed by: HOSPITALIST

## 2025-02-19 PROCEDURE — 999N000128 HC STATISTIC PERIPHERAL IV START W/O US GUIDANCE

## 2025-02-19 PROCEDURE — 85004 AUTOMATED DIFF WBC COUNT: CPT | Performed by: HOSPITALIST

## 2025-02-19 PROCEDURE — 250N000013 HC RX MED GY IP 250 OP 250 PS 637: Performed by: HOSPITALIST

## 2025-02-19 PROCEDURE — 36415 COLL VENOUS BLD VENIPUNCTURE: CPT | Performed by: HOSPITALIST

## 2025-02-19 PROCEDURE — 82805 BLOOD GASES W/O2 SATURATION: CPT | Performed by: HOSPITALIST

## 2025-02-19 PROCEDURE — 258N000003 HC RX IP 258 OP 636: Performed by: HOSPITALIST

## 2025-02-19 PROCEDURE — 94640 AIRWAY INHALATION TREATMENT: CPT

## 2025-02-19 PROCEDURE — 120N000013 HC R&B IMCU

## 2025-02-19 PROCEDURE — 80048 BASIC METABOLIC PNL TOTAL CA: CPT | Performed by: HOSPITALIST

## 2025-02-19 RX ORDER — ACETYLCYSTEINE 200 MG/ML
4 SOLUTION ORAL; RESPIRATORY (INHALATION) EVERY 4 HOURS
Status: COMPLETED | OUTPATIENT
Start: 2025-02-19 | End: 2025-02-21

## 2025-02-19 RX ORDER — MIRTAZAPINE 15 MG/1
15 TABLET, FILM COATED ORAL DAILY
Status: DISCONTINUED | OUTPATIENT
Start: 2025-02-19 | End: 2025-02-21 | Stop reason: HOSPADM

## 2025-02-19 RX ADMIN — ACETYLCYSTEINE 4 ML: 200 SOLUTION ORAL; RESPIRATORY (INHALATION) at 19:47

## 2025-02-19 RX ADMIN — ACYCLOVIR 400 MG: 400 TABLET ORAL at 08:39

## 2025-02-19 RX ADMIN — LORAZEPAM 0.5 MG: 0.5 TABLET ORAL at 21:52

## 2025-02-19 RX ADMIN — AZITHROMYCIN MONOHYDRATE 250 MG: 500 INJECTION, POWDER, LYOPHILIZED, FOR SOLUTION INTRAVENOUS at 08:39

## 2025-02-19 RX ADMIN — IPRATROPIUM BROMIDE AND ALBUTEROL SULFATE 3 ML: .5; 3 SOLUTION RESPIRATORY (INHALATION) at 00:48

## 2025-02-19 RX ADMIN — FAMOTIDINE 20 MG: 20 TABLET, FILM COATED ORAL at 08:39

## 2025-02-19 RX ADMIN — ACETYLCYSTEINE 4 ML: 200 SOLUTION ORAL; RESPIRATORY (INHALATION) at 23:20

## 2025-02-19 RX ADMIN — OSELTAMAVIR PHOSPHATE 30 MG: 30 CAPSULE ORAL at 08:39

## 2025-02-19 RX ADMIN — CEFTRIAXONE SODIUM 2 G: 2 INJECTION, POWDER, FOR SOLUTION INTRAMUSCULAR; INTRAVENOUS at 07:12

## 2025-02-19 RX ADMIN — IPRATROPIUM BROMIDE AND ALBUTEROL SULFATE 3 ML: .5; 3 SOLUTION RESPIRATORY (INHALATION) at 08:51

## 2025-02-19 RX ADMIN — IPRATROPIUM BROMIDE AND ALBUTEROL SULFATE 3 ML: .5; 3 SOLUTION RESPIRATORY (INHALATION) at 19:45

## 2025-02-19 RX ADMIN — ASPIRIN 81 MG CHEWABLE TABLET 81 MG: 81 TABLET CHEWABLE at 08:39

## 2025-02-19 RX ADMIN — IPRATROPIUM BROMIDE AND ALBUTEROL SULFATE 3 ML: .5; 3 SOLUTION RESPIRATORY (INHALATION) at 23:20

## 2025-02-19 RX ADMIN — OSELTAMAVIR PHOSPHATE 30 MG: 30 CAPSULE ORAL at 20:00

## 2025-02-19 RX ADMIN — IPRATROPIUM BROMIDE AND ALBUTEROL SULFATE 3 ML: .5; 3 SOLUTION RESPIRATORY (INHALATION) at 15:07

## 2025-02-19 RX ADMIN — ACYCLOVIR 400 MG: 400 TABLET ORAL at 20:00

## 2025-02-19 RX ADMIN — ACETAMINOPHEN 650 MG: 325 TABLET, FILM COATED ORAL at 04:18

## 2025-02-19 RX ADMIN — METHIMAZOLE 2.5 MG: 5 TABLET ORAL at 20:00

## 2025-02-19 RX ADMIN — ESCITALOPRAM OXALATE 5 MG: 5 TABLET, FILM COATED ORAL at 08:40

## 2025-02-19 RX ADMIN — ONDANSETRON 4 MG: 4 TABLET, ORALLY DISINTEGRATING ORAL at 13:29

## 2025-02-19 ASSESSMENT — ACTIVITIES OF DAILY LIVING (ADL)
ADLS_ACUITY_SCORE: 64
ADLS_ACUITY_SCORE: 64
ADLS_ACUITY_SCORE: 62
ADLS_ACUITY_SCORE: 62
ADLS_ACUITY_SCORE: 64
ADLS_ACUITY_SCORE: 62
ADLS_ACUITY_SCORE: 64
ADLS_ACUITY_SCORE: 62
DEPENDENT_IADLS:: CLEANING;COOKING;LAUNDRY;SHOPPING;MEAL PREPARATION;MEDICATION MANAGEMENT;TRANSPORTATION
ADLS_ACUITY_SCORE: 64
ADLS_ACUITY_SCORE: 64
ADLS_ACUITY_SCORE: 62
ADLS_ACUITY_SCORE: 64
ADLS_ACUITY_SCORE: 62

## 2025-02-19 NOTE — CONSULTS
[facility]    CARDIOLOGY CONSULTATION       Date of Admission:  2/18/2025    Assessment & Plan       Acute hypoxic respiratory failure due to acute influenza B pneumonia  New apical hypokinesis/basal hyperkinesis on echo, suspicious for Takotsubo (stress-induced) cardiomyopathy, though LAD territory/multivessel CAD is also in the differential.  Acute on chronic HF with mildly reduced EF (LVEF 45-50% on my review, previously normal), appears euvolemic at present despite elevated NT proBNP which is likely reflective of atrial strain in the setting of her influenza B infection  Minimal flat troponin elevation consistent with type II MI (demand ischemia)  Secondary AV block s/p PPM in 3/2017  Asymptomatic paroxysmal AF, not on anticoagulation due to increased fall risk, multiple myeloma, and patient preference  Fluid responsive hypotension in the setting of active infection, since resolved, appears euvolemic at present        Discussed echo findings in detail with patient.  There does appear to be a new apical wall motion abnormality, most suggestive on my review of the images and also in considering her clinical picture with Takotsubo (stress-induced) cardiomyopathy.  At the same time, LAD/multivessel coronary disease is also in the differential.  Given her strong preference to avoid invasive procedures, as well as her concerns around bleeding with anticoagulation (or DAPT), we will manage conservatively for now in the absence of obvious anginal symptoms.  Recommend repeat TTE in 1 month as outpatient (ordered)  If LVEF/wall motion abnormalities do not improve, can reconsider ischemic evaluation at that time  From a volume standpoint, patient appears roughly euvolemic.  She is only on an as needed diuretic at home.  Would monitor for evidence of volume overload given recent fluid resuscitation, but at present does not appear to require IV diuresis.  We will set up outpatient cardiology  "follow-up.        Thank you for the interesting consult.  Cardiology will sign off at this time.  Please feel free to call back at anytime with future questions or concerns.        High complexity     Rick West MD        Reason for Consult   Reason for consult: Consult requested for \"fluid responsive hypotension.\"    History of Present Illness   Quiana Dunaway is a 89 year old female who has followed in our clinic in the past with Dr. Worthington and the EP team due to symptomatic secondary AV block s/p PPM in 3/2017 as well as HFpEF and asymptomatic paroxysmal AF for which she has refused anticoagulation given multiple myeloma and increased risk of falls.  She was admitted with acute onset of fever and shortness of breath and found to have influenza B infection.  She is being treated for this.  However, yesterday she had an episode of hypotension requiring fluid resuscitation with 1.5 L of normal saline.  She also had some increased oxygen requirement.  Cardiac testing was done including NT proBNP which was elevated at 3600 (significantly up from prior), high-sensitivity troponin of 61 initially and 62 on recheck, and a TTE was done which was read as an LVEF of 50-55% with distal septal and apical wall hypokinesis thought to be due to pacemaker activation, as well as mild MR with normal IVC suggestive of RA pressure of 3 mmHg (this was done around 1 PM prior to the normal saline infusion).  I did personally review the echo, and on my review this has an appearance suspicious for Takotsubo (stress-induced) cardiomyopathy with basal hyperkinesis and severe apical hypokinesis.    Today at the bedside she reports that her shortness of breath is significantly improved, though not yet completely back to baseline.  She denies any chest pain whatsoever.  She strongly prefers to minimize invasive procedures and also continues to refuse anticoagulation.      Prior to Admission Medications   Prior to Admission Medications "   Prescriptions Last Dose Informant Patient Reported? Taking?   Daratumumab (DARZALEX IV) 1/28/2025 Daughter Yes Yes   Sig: Inject into the vein every 28 days. Q monthly injection, given on Mondays at [facility] infusion center   LORazepam (ATIVAN) 0.5 MG tablet Unknown  No Yes   Sig: Take 1 tablet (0.5 mg) by mouth every 6 hours as needed for anxiety.   Loperamide HCl (IMODIUM A-D PO) Unknown Daughter Yes Yes   Sig: Take 1 tablet by mouth as needed    acyclovir (ZOVIRAX) 400 MG tablet 2/17/2025 Morning  No Yes   Sig: Take 1 tablet (400 mg) by mouth 2 times daily Viral Prophylaxis.   aspirin 81 MG chewable tablet 2/17/2025 Morning Daughter Yes Yes   Sig: Take 81 mg by mouth daily    cyanocobalamin (VITAMIN B-12) 1000 MCG tablet 2/17/2025 Morning  No Yes   Sig: Take 1 tablet (1,000 mcg) by mouth daily   dexAMETHasone (DECADRON) 4 MG tablet 2/16/2025  No Yes   Sig: Take 1 tablet (4 mg) by mouth once a week   escitalopram (LEXAPRO) 5 MG tablet 2/17/2025 Morning  No Yes   Sig: Take 1 tablet (5 mg) by mouth daily.   famotidine (PEPCID) 40 MG tablet 2/17/2025 Morning  No Yes   Sig: Take 1 tablet (40 mg) by mouth 2 times daily.   furosemide (LASIX) 20 MG tablet Unknown  No Yes   Sig: Take once daily as needed for swelling or blood pressure greater than 170/100   meclizine (ANTIVERT) 25 MG tablet 2/17/2025 Morning  No Yes   Sig: Take 1 tablet (25 mg) by mouth 3 times daily as needed for dizziness   Patient taking differently: Take 25 mg by mouth 2 times daily. And daily as needed for dizziness   methimazole (TAPAZOLE) 5 MG tablet 2/16/2025 Evening  No Yes   Sig: Take 0.5 tablets (2.5 mg) by mouth daily.   mirtazapine (REMERON) 15 MG tablet 2/17/2025 Morning  No Yes   Sig: Take 1 tablet (15 mg) by mouth at bedtime   nitroglycerin (NITROSTAT) 0.4 MG sublingual tablet Unknown Daughter No Yes   Sig: For chest pain place 1 tablet under the tongue every 5 minutes for 3 doses. If symptoms persist 5 minutes after 1st dose call 911.    ondansetron (ZOFRAN) 8 MG tablet Unknown  No Yes   Sig: Take 1 tablet (8 mg) by mouth every 8 hours as needed for nausea   pomalidomide (POMALYST) 2 MG capsule   No No   Sig: Take 1 capsule (2 mg) by mouth daily for 14 days Swallow whole, do NOT break, crush, chew or open capsule. Take on days 1-14 of repeated 28 day cycles.   pomalidomide (POMALYST) 2 MG capsule   No No   Sig: Take 1 capsule (2 mg) by mouth daily for 14 days Swallow whole, do NOT break, crush, chew or open capsule. Take on days 1-14 of repeated 28 day cycles.   potassium chloride will ER (KLOR-CON M10) 10 MEQ CR tablet 2/17/2025 Morning  No Yes   Sig: Take 1 tablet (10 mEq) by mouth daily   prochlorperazine (COMPAZINE) 10 MG tablet Unknown  No Yes   Sig: Take 1 tablet (10 mg) by mouth every 6 hours as needed (Nausea/Vomiting).   triamcinolone (KENALOG) 0.1 % paste Not Taking  No No   Sig: Take by mouth 2 times daily.   Patient not taking: Reported on 2/18/2025      Facility-Administered Medications: None     Current Facility-Administered Medications   Medication Dose Route Frequency Provider Last Rate Last Admin    acetaminophen (TYLENOL) tablet 650 mg  650 mg Oral Q4H PRN Mohit Shoemaker MD   650 mg at 02/19/25 0418    Or    acetaminophen (TYLENOL) Suppository 650 mg  650 mg Rectal Q4H PRN Mohit Shoemaker MD   650 mg at 02/18/25 1236    acyclovir (ZOVIRAX) tablet 400 mg  400 mg Oral BID Saurabh Red MD   400 mg at 02/19/25 0839    albuterol (PROVENTIL) neb solution 2.5 mg  2.5 mg Nebulization Q2H PRN Mohit Shoemaker MD        aspirin (ASA) chewable tablet 81 mg  81 mg Oral Daily Saurabh Red MD   81 mg at 02/19/25 0839    azithromycin (ZITHROMAX) 250 mg in sodium chloride 0.9 % 250 mL intermittent infusion  250 mg Intravenous Q24H Saurabh Red MD   250 mg at 02/19/25 0839    benzocaine-menthol (CHLORASEPTIC) 6-10 MG lozenge 1 lozenge  1 lozenge Buccal Q1H PRMohit Reese MD         calcium carbonate (TUMS) chewable tablet 1,000 mg  1,000 mg Oral 4x Daily PRN Mohit Shoemaker MD        cefTRIAXone (ROCEPHIN) 2 g vial to attach to  ml bag for ADULTS or NS 50 ml bag for PEDS  2 g Intravenous Q24H Saurabh Red MD 0 mL/hr at 02/18/25 0910 2 g at 02/19/25 0712    [START ON 2/23/2025] dexAMETHasone (DECADRON) tablet 4 mg  4 mg Oral Weekly Saurabh Red MD        escitalopram (LEXAPRO) tablet 5 mg  5 mg Oral Daily Saurabh Red MD   5 mg at 02/19/25 0840    famotidine (PEPCID) tablet 20 mg  20 mg Oral Daily Mohit Shoemaker MD   20 mg at 02/19/25 0839    guaiFENesin-dextromethorphan (ROBITUSSIN DM) 100-10 MG/5ML syrup 10 mL  10 mL Oral Q4H PRN Mohit Shoemaker MD        ipratropium - albuterol 0.5 mg/2.5 mg/3 mL (DUONEB) neb solution 3 mL  3 mL Nebulization Q4H While awake Mohit Shoemaker MD   3 mL at 02/19/25 0851    lidocaine (LMX4) cream   Topical Q1H PRN Mohit Shoemaker MD        lidocaine 1 % 0.1-1 mL  0.1-1 mL Other Q1H PRN Mohit Shoemaker MD        LORazepam (ATIVAN) tablet 0.5 mg  0.5 mg Oral Q6H PRN Saurabh Red MD        melatonin tablet 3 mg  3 mg Oral At Bedtime PRN Mohit Shoemaker MD        methimazole (TAPAZOLE) half-tab 2.5 mg  2.5 mg Oral QPM Saurabh Red MD   2.5 mg at 02/18/25 2051    mirtazapine (REMERON) tablet 15 mg  15 mg Oral At Bedtime Saurabh Red MD        ondansetron (ZOFRAN ODT) ODT tab 4 mg  4 mg Oral Q6H PRN Mohit Shoemaker MD        Or    ondansetron (ZOFRAN) injection 4 mg  4 mg Intravenous Q6H PRN Mohit Shoemaker MD        oseltamivir (TAMIFLU) capsule 30 mg  30 mg Oral BID Mohit Shoemaker MD   30 mg at 02/19/25 0839    senna-docusate (SENOKOT-S/PERICOLACE) 8.6-50 MG per tablet 1 tablet  1 tablet Oral BID PRN Mohit Shoemaker MD        Or    senna-docusate (SENOKOT-S/PERICOLACE) 8.6-50 MG per tablet 2 tablet  2 tablet Oral BID PRJAENNETTE Shoemaker  Mohit Hawkins MD        sodium chloride (PF) 0.9% PF flush 3 mL  3 mL Intracatheter Q8H Mohit Shoemaker MD   3 mL at 02/19/25 0711    sodium chloride (PF) 0.9% PF flush 3 mL  3 mL Intracatheter q1 min prn Mohit Shoemaker MD         Current Facility-Administered Medications   Medication Dose Route Frequency Provider Last Rate Last Admin    acetaminophen (TYLENOL) tablet 650 mg  650 mg Oral Q4H PRN Mohit Shoemaker MD   650 mg at 02/19/25 0418    Or    acetaminophen (TYLENOL) Suppository 650 mg  650 mg Rectal Q4H PRN Mohit Shoemaker MD   650 mg at 02/18/25 1236    acyclovir (ZOVIRAX) tablet 400 mg  400 mg Oral BID Saurabh Red MD   400 mg at 02/19/25 0839    albuterol (PROVENTIL) neb solution 2.5 mg  2.5 mg Nebulization Q2H PRN Mohit Shoemaker MD        aspirin (ASA) chewable tablet 81 mg  81 mg Oral Daily Saurabh Red MD   81 mg at 02/19/25 0839    azithromycin (ZITHROMAX) 250 mg in sodium chloride 0.9 % 250 mL intermittent infusion  250 mg Intravenous Q24H Saurabh Red MD   250 mg at 02/19/25 0839    benzocaine-menthol (CHLORASEPTIC) 6-10 MG lozenge 1 lozenge  1 lozenge Buccal Q1H PRMohit Reese MD        calcium carbonate (TUMS) chewable tablet 1,000 mg  1,000 mg Oral 4x Daily PRN Mohit Shoemaker MD        cefTRIAXone (ROCEPHIN) 2 g vial to attach to  ml bag for ADULTS or NS 50 ml bag for PEDS  2 g Intravenous Q24H Saurabh Red MD 0 mL/hr at 02/18/25 0910 2 g at 02/19/25 0712    [START ON 2/23/2025] dexAMETHasone (DECADRON) tablet 4 mg  4 mg Oral Weekly Saurabh Red MD        escitalopram (LEXAPRO) tablet 5 mg  5 mg Oral Daily Saurabh Red MD   5 mg at 02/19/25 0840    famotidine (PEPCID) tablet 20 mg  20 mg Oral Daily Mohit Shoemaker MD   20 mg at 02/19/25 0839    guaiFENesin-dextromethorphan (ROBITUSSIN DM) 100-10 MG/5ML syrup 10 mL  10 mL Oral Q4H PRN Mohit Shoemaker MD         ipratropium - albuterol 0.5 mg/2.5 mg/3 mL (DUONEB) neb solution 3 mL  3 mL Nebulization Q4H While awake Mohit Shoemaker MD   3 mL at 02/19/25 0851    lidocaine (LMX4) cream   Topical Q1H PRN Mohit Shoemaker MD        lidocaine 1 % 0.1-1 mL  0.1-1 mL Other Q1H PRN Mohit Shoemaker MD        LORazepam (ATIVAN) tablet 0.5 mg  0.5 mg Oral Q6H PRN Saurabh Red MD        melatonin tablet 3 mg  3 mg Oral At Bedtime PRN Mohit Shoemaker MD        methimazole (TAPAZOLE) half-tab 2.5 mg  2.5 mg Oral QPM Saurabh Red MD   2.5 mg at 02/18/25 2051    mirtazapine (REMERON) tablet 15 mg  15 mg Oral At Bedtime Saurabh Red MD        ondansetron (ZOFRAN ODT) ODT tab 4 mg  4 mg Oral Q6H PRN Mohit Shoemaker MD        Or    ondansetron (ZOFRAN) injection 4 mg  4 mg Intravenous Q6H PRN Mohit Shoemaker MD        oseltamivir (TAMIFLU) capsule 30 mg  30 mg Oral BID Mohit Shoemaker MD   30 mg at 02/19/25 0839    senna-docusate (SENOKOT-S/PERICOLACE) 8.6-50 MG per tablet 1 tablet  1 tablet Oral BID PRN Mohit Shoemaker MD        Or    senna-docusate (SENOKOT-S/PERICOLACE) 8.6-50 MG per tablet 2 tablet  2 tablet Oral BID PRN Mohit Shoemaker MD        sodium chloride (PF) 0.9% PF flush 3 mL  3 mL Intracatheter Q8H Mohit Shoemaker MD   3 mL at 02/19/25 0711    sodium chloride (PF) 0.9% PF flush 3 mL  3 mL Intracatheter q1 min prn Mohit Shoemaker MD         Allergies   Allergies   Allergen Reactions    Levaquin [Levofloxacin] Other (See Comments)     Tingling in feet after 1 dose on 8/7/19           Physical Exam   Vital Signs with Ranges  Temp:  [98  F (36.7  C)-100.2  F (37.9  C)] 99.6  F (37.6  C)  Pulse:  [63-87] 78  Resp:  [22-37] 34  BP: ()/(39-68) 120/64  SpO2:  [91 %-100 %] 96 %  Wt Readings from Last 4 Encounters:   02/18/25 59 kg (130 lb)   01/28/25 59.9 kg (132 lb)   01/28/25 58.1 kg (128 lb)   01/20/25 61.2 kg (135 lb)     I/O  "last 3 completed shifts:  In: 120 [P.O.:120]  Out: -       Vitals: /64 (BP Location: Right arm)   Pulse 78   Temp 99.6  F (37.6  C) (Axillary)   Resp (!) 34   Ht 1.549 m (5' 1\")   Wt 59 kg (130 lb)   LMP  (LMP Unknown)   SpO2 96%   BMI 24.56 kg/m      Physical Exam:   Constitutional: Chronically ill elderly woman in no acute distress  Respiratory: Mildly increased respiratory effort, diffuse crackles and expiratory wheezes  Cardiovascular: RRR, no obvious m/r/g.  JVP < 7 cm H2O.  There is no significant LE edema.  Normal carotid upstrokes, no carotid bruits.        Clinically Significant Risk Factors                 # Thrombocytopenia: Lowest platelets = 110 in last 2 days, will monitor for bleeding   # Hypertension: Noted on problem list  # Chronic heart failure with preserved ejection fraction: heart failure noted on problem list and last echo with EF >50%                # Pacemaker present      Cardiac Arrhythmia: Atrial fibrillation: Paroxysmal  Diastolic chronic    Pneumonia    Pulmonary Heart Disease (Pulmonary hypertension or Cor pulmonale): Pulmonary Hypertension, unspecified    Chronic Fatigue and Other Debilities: Age-related physical debility      "

## 2025-02-19 NOTE — PROGRESS NOTES
[facility]  Hospitalist Progress Note        Saurabh Red MD   02/19/2025        Interval History:        - Evaluated by RRT 2/18 for worsening shortness of breath, had NT suction; repeat chest x-ray with no acute airspace disease or effusion; was also noted to be hypotensive and was given 250 ML IV fluid bolus; NICOM assessment noted fluid responsive, was given another liter fluid bolus  - reports clinically feeling better; T max 103 F; currently on 2 L oxygen  - later in the day patient noted with acute congestion/coarse breath sounds with some concern for aspiration--- will schedule mucomyst nebs (with bronchodilators) Q4 hours; will make NPO and get SLP evaluation  - ECHO 2/18/25 for evaluation of hypotension noted LVEF 50-55%, distal septal and apical wall hypokinesia- this could reflect conduction abnormality/pacemaker activation  - cardiology consulted (2/19), suspect Takotsubo cardiomyopathy but LAD/multivessel CAD also in differential; patient prefers to avoid invasive procedure; cards suggested for conservative management--- to repeat ECHO in 1 month (ordered) and to consider ischemic evaluation if wall motion abnormalities do not improve  - to follow up with cardiology outpatient  - will get PT evaluation; care coordinator to follow for disposition         Assessment and Plan:        Quiana Dunaway is a 89 year old woman with PMH most notable for Multiple Myeloma (on chemotherapy), h/o high grade AV block (s/p PM), paroxysmal atrial fibrillation (not on anticoagulation) who presents with acute cough, dyspnea, and fever, and is found to have acute hypoxemic respiratory failure and bilateral pneumonia due to acute Influenza B, as well as elevated Troponin, admitted inpatient 2/18/25.    Acute hypoxemic respiratory failure and bilateral pneumonia due to acute Influenza B:   - initial workup with BNP 3598, normal lactic acid; elevated Pro calcitonin 0.77; serial troponin with no  significant trend 61--- 62  -blood cultures from 2/18 with no growth so far  -influenza B positive; COVID and RSV negative  -chest x-ray 2/18 with pulmonary venous congestion and mild increased lower lung interstitial markings, without pleural effusion or pneumothorax     - Will continue with Tamiflu, also added Rocephin and azithromycin for likely superimposed pneumonia (2/18)  - continue duo nebs Q4 hours  - PRN cough medication  - encourage incentive spirometry; wean oxygen as able; currently on 2 L oxygen  - Evaluated by RRT 2/18 for worsening shortness of breath, had NT suction; repeat chest x-ray with no acute airspace disease or effusion; was also noted to be hypotensive and was given 250 ML IV fluid bolus; NICOM assessment noted fluid responsive, was given another liter fluid bolus  - on 2/19: noted with acute congestion/coarse breath sounds with some concern for aspiration--- will schedule mucomyst nebs (with bronchodilators) Q4 hours; will make NPO and get SLP evaluation    Abnormal ECHO, 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  likely type II non-STEMI due to influenza  paroxysmal atrial fibrillation (decline anticoagulation)  H/o high-grade AV block (s/p pacemaker placement)  Hypertension  - serial troponin with no significant trend 62---61; denies any chest pain; EKG with paced rhythm  - ECHO 2/18/25 for evaluation of hypotension noted LVEF 50-55%, distal septal and apical wall hypokinesia- this could reflect conduction abnormality/pacemaker activation  - cardiology consulted (2/19), suspect Takotsubo cardiomyopathy but LAD/multivessel CAD also in differential; patient prefers to avoid invasive procedure; cards suggested for conservative management--- to repeat ECHO in 1 month (ordered) and to consider ischemic evaluation if wall motion abnormalities do not improve  - to follow up with cardiology outpatient  - continue PTA aspirin 81 mg daily  - takes Lasix only PRN, will hold for now     Chronic Anemia due to  "Multiple Myeloma: Followed by  Oncology, diagnosed in 2017.  She is currently prescribed Pomalyst 2 mg (14 days on, 14 days off), Daratumumab monthly, and Dexamethasone 4 mg weekly   - recent baseline hemoglobin around 10 to 11  - on presentation Hb 9.9---->7.8; no suggestion of active bleed; likely some dilutional  component with IV fluid boluses for hypotension; will monitor CBC     Chronic anxiety:   - continue PTA Lexapro , Remeron    Hyperthyroidism   - continue PTA Methimazole  - last TSH from 12/31 was normal at 3.49    Diet: Combination Diet Regular Diet Adult      DVT Prophylaxis: mechanical with PCD boots    Code status: full code    Disposition:   Medically Ready for Discharge: Anticipated in 2-4 Days pending clinical improvement  -will get PT evaluation   - for disposition planning    Care plan discussed with patient and her daughter-in-law present in room along with nursing     High medical complexity    Clinically Significant Risk Factors                   # Thrombocytopenia: Lowest platelets = 110 in last 2 days, will monitor for bleeding       # Hypertension: Noted on problem list  # Chronic heart failure with preserved ejection fraction: heart failure noted on problem list and last echo with EF >50%                # Pacemaker present                        Physical Exam:      Blood pressure 128/68, pulse 68, temperature 98.9  F (37.2  C), temperature source Oral, resp. rate 25, height 1.549 m (5' 1\"), weight 59 kg (130 lb), SpO2 97%, not currently breastfeeding.  Vitals:    02/18/25 0428   Weight: 59 kg (130 lb)     Vital Signs with Ranges  Temp:  [98  F (36.7  C)-103.1  F (39.5  C)] 98.9  F (37.2  C)  Pulse:  [63-98] 68  Resp:  [22-45] 25  BP: ()/(39-80) 128/68  SpO2:  [91 %-100 %] 97 %  I/O's Last 24 hours  I/O last 3 completed shifts:  In: 120 [P.O.:120]  Out: -     Constitutional: Alert, awake and oriented; resting comfortably in no apparent distress       Oral cavity: Moist " mucosa   Cardiovascular: Normal s1 s2, regular rate and rhythm, no murmur   Lungs: coarse breath sounds bilaterally ; right>left; no significant wheezing   Abdomen: Soft, nt, nd, no guarding, rigidity or rebound; BS +   LE : No edema   Musculoskeletal/Neuro Power 5/5 in all extremities; No focal neurological deficits noted   Psychiatry: normal mood and affect                Medications:        Current Facility-Administered Medications   Medication Dose Route Frequency Provider Last Rate Last Admin    acyclovir (ZOVIRAX) tablet 400 mg  400 mg Oral BID Saurabh Red MD   400 mg at 02/18/25 2050    aspirin (ASA) chewable tablet 81 mg  81 mg Oral Daily Saurabh Red MD        azithromycin (ZITHROMAX) 250 mg in sodium chloride 0.9 % 250 mL intermittent infusion  250 mg Intravenous Q24H Saruabh Red MD        cefTRIAXone (ROCEPHIN) 2 g vial to attach to  ml bag for ADULTS or NS 50 ml bag for PEDS  2 g Intravenous Q24H Saurabh Red MD 0 mL/hr at 02/18/25 0910 2 g at 02/19/25 0712    [START ON 2/23/2025] dexAMETHasone (DECADRON) tablet 4 mg  4 mg Oral Weekly Saurabh Red MD        escitalopram (LEXAPRO) tablet 5 mg  5 mg Oral Daily Saurabh Red MD        famotidine (PEPCID) tablet 20 mg  20 mg Oral Daily Mohit Shoemaker MD        ipratropium - albuterol 0.5 mg/2.5 mg/3 mL (DUONEB) neb solution 3 mL  3 mL Nebulization Q4H While awake Mohit Shoemaker MD   3 mL at 02/19/25 0048    methimazole (TAPAZOLE) half-tab 2.5 mg  2.5 mg Oral QPM Saurabh Red MD   2.5 mg at 02/18/25 2051    mirtazapine (REMERON) tablet 15 mg  15 mg Oral At Bedtime Saurabh Red MD        oseltamivir (TAMIFLU) capsule 30 mg  30 mg Oral BID Mohit Shoemaker MD   30 mg at 02/18/25 2050    sodium chloride (PF) 0.9% PF flush 3 mL  3 mL Intracatheter Q8H Mohit Shoemaker MD   3 mL at 02/19/25 0711     PRN Meds:   Current Facility-Administered Medications    Medication Dose Route Frequency Provider Last Rate Last Admin    acetaminophen (TYLENOL) tablet 650 mg  650 mg Oral Q4H PRN Mohit Shoemaker MD   650 mg at 02/19/25 0418    Or    acetaminophen (TYLENOL) Suppository 650 mg  650 mg Rectal Q4H PRN Mohit Shoemaker MD   650 mg at 02/18/25 1236    albuterol (PROVENTIL) neb solution 2.5 mg  2.5 mg Nebulization Q2H PRN Mohit Shoemaker MD        benzocaine-menthol (CHLORASEPTIC) 6-10 MG lozenge 1 lozenge  1 lozenge Buccal Q1H PRMohit Reese MD        calcium carbonate (TUMS) chewable tablet 1,000 mg  1,000 mg Oral 4x Daily PRN Mohit Shoemaker MD        guaiFENesin-dextromethorphan (ROBITUSSIN DM) 100-10 MG/5ML syrup 10 mL  10 mL Oral Q4H PRN Mohit Shoemaker MD        lidocaine (LMX4) cream   Topical Q1H PRN Mohit Shoemaker MD        lidocaine 1 % 0.1-1 mL  0.1-1 mL Other Q1H PRN Mohit Shoemaker MD        LORazepam (ATIVAN) tablet 0.5 mg  0.5 mg Oral Q6H PRN Saurabh Red MD        melatonin tablet 3 mg  3 mg Oral At Bedtime PRN Mohit Shoemaker MD        ondansetron (ZOFRAN ODT) ODT tab 4 mg  4 mg Oral Q6H PRN Mohit Shoemaker MD        Or    ondansetron (ZOFRAN) injection 4 mg  4 mg Intravenous Q6H PRMohit Reese MD        senna-docusate (SENOKOT-S/PERICOLACE) 8.6-50 MG per tablet 1 tablet  1 tablet Oral BID PRMohit Reese MD        Or    senna-docusate (SENOKOT-S/PERICOLACE) 8.6-50 MG per tablet 2 tablet  2 tablet Oral BID PRN Mohit Shoemaker MD        sodium chloride (PF) 0.9% PF flush 3 mL  3 mL Intracatheter q1 min prn Mohit Shoemaker MD                Data:      All new lab and imaging data was reviewed.   Recent Labs   Lab Test 02/19/25  0610 02/18/25  0405 01/28/25  0912   WBC 3.2* 5.4 5.9   HGB 7.8* 9.9* 10.5*   MCV 88 89 91   * 115* 224      Recent Labs   Lab Test 02/19/25  0610 02/18/25  0405 01/28/25  0913    137 143   POTASSIUM 3.6  4.1 3.7   CHLORIDE 106 99 107   CO2 25 25 27   BUN 16.7 19.8 21.3   CR 0.64 0.77 0.73   ANIONGAP 8 13 9   HUMBERTO 7.5* 8.3* 8.9   GLC 90 112* 90     Recent Labs   Lab Test 05/25/20  0904 05/25/20  0430 05/25/20  0039   TROPI 0.041 0.051* 0.068*          Home

## 2025-02-19 NOTE — CONSULTS
Care Management Initial Consult    General Information  Assessment completed with: Patient, Friend, patient and friend at bedside  Type of CM/SW Visit: Initial Assessment    Primary Care Provider verified and updated as needed: Yes   Readmission within the last 30 days: no previous admission in last 30 days      Reason for Consult: discharge planning  Advance Care Planning: Advance Care Planning Reviewed: no concerns identified          Communication Assessment  Patient's communication style: spoken language (English or Bilingual)             Cognitive  Cognitive/Neuro/Behavioral: .WDL except  Level of Consciousness: alert  Arousal Level: opens eyes spontaneously  Orientation: disoriented to, situation  Mood/Behavior: calm, cooperative  Best Language: 0 - No aphasia  Speech: spontaneous    Living Environment:   People in home: child(jocelyn), adult  son and his spouse Regi  Current living Arrangements: house      Able to return to prior arrangements: yes       Family/Social Support:  Care provided by: child(jocelyn), self  Provides care for: no one  Marital Status:   Support system:            Description of Support System:           Current Resources:   Patient receiving home care services: No        Community Resources: None  Equipment currently used at home: walker, rolling, wheelchair, manual  Supplies currently used at home:      Employment/Financial:  Employment Status: retired        Financial Concerns: none           Does the patient's insurance plan have a 3 day qualifying hospital stay waiver?  Yes     Which insurance plan 3 day waiver is available? [employer]    Will the waiver be used for post-acute placement? No    Lifestyle & Psychosocial Needs:  Social Drivers of Health     Food Insecurity: Low Risk  (11/30/2023)    Food Insecurity     Within the past 12 months, did you worry that your food would run out before you got money to buy more?: No     Within the past 12 months, did the food you bought just  not last and you didn t have money to get more?: No   Depression: Not at risk (1/20/2025)    PHQ-2     PHQ-2 Score: 0   Housing Stability: Low Risk  (11/30/2023)    Housing Stability     Do you have housing? : Yes     Are you worried about losing your housing?: No   Tobacco Use: Low Risk  (1/20/2025)    Patient History     Smoking Tobacco Use: Never     Smokeless Tobacco Use: Never     Passive Exposure: Not on file   Financial Resource Strain: Low Risk  (11/30/2023)    Financial Resource Strain     Within the past 12 months, have you or your family members you live with been unable to get utilities (heat, electricity) when it was really needed?: No   Alcohol Use: Not on file   Transportation Needs: Low Risk  (11/30/2023)    Transportation Needs     Within the past 12 months, has lack of transportation kept you from medical appointments, getting your medicines, non-medical meetings or appointments, work, or from getting things that you need?: No   Physical Activity: Inactive (5/27/2020)    Exercise Vital Sign     Days of Exercise per Week: 0 days     Minutes of Exercise per Session: 0 min   Interpersonal Safety: Low Risk  (3/13/2024)    Interpersonal Safety     Do you feel physically and emotionally safe where you currently live?: Yes     Within the past 12 months, have you been hit, slapped, kicked or otherwise physically hurt by someone?: No     Within the past 12 months, have you been humiliated or emotionally abused in other ways by your partner or ex-partner?: No   Stress: No Stress Concern Present (5/27/2020)    [employer] - Occupational Stress Questionnaire     Feeling of Stress : Not at all   Social Connections: Unknown (5/27/2020)    Social Connection and Isolation Panel [NHANES]     Frequency of Communication with Friends and Family: Twice a week     Frequency of Social Gatherings with Friends and Family: Twice a week     Attends Je[employer] witness Services: More than 4 times per year      Active Member of Clubs or Organizations: Yes     Attends Club or Organization Meetings: Never     Marital Status: Not on file   Health Literacy: Not on file       Functional Status:  Prior to admission patient needed assistance:   Dependent ADLs:: Ambulation-walker, Wheelchair-with assist  Dependent IADLs:: Cleaning, Cooking, Laundry, Shopping, Meal Preparation, Medication Management, Transportation       Mental Health Status:          Chemical Dependency Status:                Values/Beliefs:  Spiritual, Cultural Beliefs, Adventism Practices, Values that affect care: no               Discussed  Partnership in Safe Discharge Planning  document with patient/family: No    Additional Information:  Met with patient and her friend visiting at bedside; introduced self and explained role in discharge planning.    Patient admitted with respiratory failure with influenza.    Patient Quiana lives with her son and daughter in law Deluna in their home.  The address on the facesheet is her previous address.  She lives on [address] in [place] but does not remember the exact address.  She moved in after her family built this added room on for her which includes bedroom and bathroom with walk in shower.    Patient is independent in ambulating with her walker, toileting, dressing.  She receives assistance from her granddaughter for weekly showers.  Her KEIRY Deluna assists with her medications.  Her other son lives a couple blocks away so she has a wheelchair with family assist to visit him at his home.    Patient has not been seen yet by PT.  Patient would not want a TCU and she is ambulating to the bathroom with assist; she is SOB with activity.  She would be open for home care RN and PT if that should be indicated.  Otherwise, she was contemplating calling the private pay home care services she had once for her spouse for more help for her at home.  She is not left alone in the home; someone is always home.      Family  would provide transportation at discharge.    Next Steps: Watch for medical readiness and for PT recommendation for possible home care and submit referral.  Patient may need oxygen at discharge and is accepting of that if required.  If she will have home care we will need to obtain her address and update.      Frida Sanchez RN  Inpatient Float Care Coordinator  [facility]  [email or web address]

## 2025-02-19 NOTE — PLAN OF CARE
Goal Outcome Evaluation:      Plan of Care Reviewed With: patient          Outcome Evaluation: Discharge to home with family; possible home care pending PT recommendation.

## 2025-02-19 NOTE — PLAN OF CARE
Goal Outcome Evaluation:      Plan of Care Reviewed With: patient    Overall Patient Progress: improvingOverall Patient Progress: improving         A/Ox4. VSS. On 3lpm oxymask. Tele SR with BBB. LS coarse in upper lobes, diminished in bases. Dyspneic on exertion. Productive congested cough. Sputum is very thick.  +bs, +flatus, -bm. Voiding adequately. Denied pain. Up with assist x1 gb and walker. On regular diet. Pt is kosher.

## 2025-02-20 ENCOUNTER — APPOINTMENT (OUTPATIENT)
Dept: PHYSICAL THERAPY | Facility: CLINIC | Age: 89
DRG: 193 | End: 2025-02-20
Attending: HOSPITALIST
Payer: MEDICARE

## 2025-02-20 ENCOUNTER — APPOINTMENT (OUTPATIENT)
Dept: SPEECH THERAPY | Facility: CLINIC | Age: 89
DRG: 193 | End: 2025-02-20
Attending: HOSPITALIST
Payer: MEDICARE

## 2025-02-20 VITALS
DIASTOLIC BLOOD PRESSURE: 68 MMHG | SYSTOLIC BLOOD PRESSURE: 154 MMHG | HEART RATE: 70 BPM | TEMPERATURE: 98.3 F | OXYGEN SATURATION: 94 % | WEIGHT: 130 LBS | HEIGHT: 61 IN | BODY MASS INDEX: 24.55 KG/M2 | RESPIRATION RATE: 44 BRPM

## 2025-02-20 LAB
BACTERIA BLD CULT: NORMAL
BACTERIA BLD CULT: NORMAL
BASOPHILS # BLD AUTO: 0 10E3/UL (ref 0–0.2)
BASOPHILS NFR BLD AUTO: 1 %
ELLIPTOCYTES BLD QL SMEAR: SLIGHT
EOSINOPHIL # BLD AUTO: 0 10E3/UL (ref 0–0.7)
EOSINOPHIL NFR BLD AUTO: 0 %
ERYTHROCYTE [DISTWIDTH] IN BLOOD BY AUTOMATED COUNT: 18 % (ref 10–15)
FRAGMENTS BLD QL SMEAR: ABNORMAL
HCT VFR BLD AUTO: 24.9 % (ref 35–47)
HGB BLD-MCNC: 7.9 G/DL (ref 11.7–15.7)
IMM GRANULOCYTES # BLD: 0.1 10E3/UL
IMM GRANULOCYTES NFR BLD: 1 %
LYMPHOCYTES # BLD AUTO: 0.6 10E3/UL (ref 0.8–5.3)
LYMPHOCYTES NFR BLD AUTO: 14 %
MCH RBC QN AUTO: 28.7 PG (ref 26.5–33)
MCHC RBC AUTO-ENTMCNC: 31.7 G/DL (ref 31.5–36.5)
MCV RBC AUTO: 91 FL (ref 78–100)
MONOCYTES # BLD AUTO: 0.8 10E3/UL (ref 0–1.3)
MONOCYTES NFR BLD AUTO: 19 %
NEUTROPHILS # BLD AUTO: 2.6 10E3/UL (ref 1.6–8.3)
NEUTROPHILS NFR BLD AUTO: 65 %
NRBC # BLD AUTO: 0 10E3/UL
NRBC BLD AUTO-RTO: 0 /100
PLAT MORPH BLD: ABNORMAL
PLATELET # BLD AUTO: 128 10E3/UL (ref 150–450)
RBC # BLD AUTO: 2.75 10E6/UL (ref 3.8–5.2)
RBC MORPH BLD: ABNORMAL
WBC # BLD AUTO: 4 10E3/UL (ref 4–11)

## 2025-02-20 PROCEDURE — 97530 THERAPEUTIC ACTIVITIES: CPT | Mod: GP

## 2025-02-20 PROCEDURE — 250N000011 HC RX IP 250 OP 636: Performed by: HOSPITALIST

## 2025-02-20 PROCEDURE — 97161 PT EVAL LOW COMPLEX 20 MIN: CPT | Mod: GP

## 2025-02-20 PROCEDURE — 250N000009 HC RX 250: Performed by: HOSPITALIST

## 2025-02-20 PROCEDURE — 999N000157 HC STATISTIC RCP TIME EA 10 MIN

## 2025-02-20 PROCEDURE — 92610 EVALUATE SWALLOWING FUNCTION: CPT | Mod: GN | Performed by: SPEECH-LANGUAGE PATHOLOGIST

## 2025-02-20 PROCEDURE — 120N000013 HC R&B IMCU

## 2025-02-20 PROCEDURE — 258N000003 HC RX IP 258 OP 636: Performed by: HOSPITALIST

## 2025-02-20 PROCEDURE — 92526 ORAL FUNCTION THERAPY: CPT | Mod: GN | Performed by: SPEECH-LANGUAGE PATHOLOGIST

## 2025-02-20 PROCEDURE — 250N000013 HC RX MED GY IP 250 OP 250 PS 637: Performed by: HOSPITALIST

## 2025-02-20 PROCEDURE — 85004 AUTOMATED DIFF WBC COUNT: CPT | Performed by: HOSPITALIST

## 2025-02-20 PROCEDURE — 999N000128 HC STATISTIC PERIPHERAL IV START W/O US GUIDANCE

## 2025-02-20 PROCEDURE — 99233 SBSQ HOSP IP/OBS HIGH 50: CPT | Performed by: HOSPITALIST

## 2025-02-20 PROCEDURE — 36415 COLL VENOUS BLD VENIPUNCTURE: CPT | Performed by: HOSPITALIST

## 2025-02-20 PROCEDURE — 94640 AIRWAY INHALATION TREATMENT: CPT | Mod: 76

## 2025-02-20 PROCEDURE — 94640 AIRWAY INHALATION TREATMENT: CPT

## 2025-02-20 PROCEDURE — 85041 AUTOMATED RBC COUNT: CPT | Performed by: HOSPITALIST

## 2025-02-20 PROCEDURE — 85014 HEMATOCRIT: CPT | Performed by: HOSPITALIST

## 2025-02-20 RX ADMIN — ONDANSETRON 4 MG: 4 TABLET, ORALLY DISINTEGRATING ORAL at 17:10

## 2025-02-20 RX ADMIN — CEFTRIAXONE SODIUM 2 G: 2 INJECTION, POWDER, FOR SOLUTION INTRAMUSCULAR; INTRAVENOUS at 10:01

## 2025-02-20 RX ADMIN — ACYCLOVIR 400 MG: 400 TABLET ORAL at 20:31

## 2025-02-20 RX ADMIN — GUAIFENESIN AND DEXTROMETHORPHAN 10 ML: 100; 10 SYRUP ORAL at 20:32

## 2025-02-20 RX ADMIN — METHIMAZOLE 2.5 MG: 5 TABLET ORAL at 20:31

## 2025-02-20 RX ADMIN — OSELTAMAVIR PHOSPHATE 30 MG: 30 CAPSULE ORAL at 10:07

## 2025-02-20 RX ADMIN — ASPIRIN 81 MG CHEWABLE TABLET 81 MG: 81 TABLET CHEWABLE at 10:01

## 2025-02-20 RX ADMIN — IPRATROPIUM BROMIDE AND ALBUTEROL SULFATE 3 ML: .5; 3 SOLUTION RESPIRATORY (INHALATION) at 03:37

## 2025-02-20 RX ADMIN — SENNOSIDES AND DOCUSATE SODIUM 1 TABLET: 50; 8.6 TABLET ORAL at 20:31

## 2025-02-20 RX ADMIN — ACETYLCYSTEINE 4 ML: 200 SOLUTION ORAL; RESPIRATORY (INHALATION) at 07:47

## 2025-02-20 RX ADMIN — ESCITALOPRAM OXALATE 5 MG: 5 TABLET, FILM COATED ORAL at 10:07

## 2025-02-20 RX ADMIN — AZITHROMYCIN MONOHYDRATE 250 MG: 500 INJECTION, POWDER, LYOPHILIZED, FOR SOLUTION INTRAVENOUS at 12:57

## 2025-02-20 RX ADMIN — FAMOTIDINE 20 MG: 20 TABLET, FILM COATED ORAL at 10:01

## 2025-02-20 RX ADMIN — IPRATROPIUM BROMIDE AND ALBUTEROL SULFATE 3 ML: .5; 3 SOLUTION RESPIRATORY (INHALATION) at 07:47

## 2025-02-20 RX ADMIN — ACYCLOVIR 400 MG: 400 TABLET ORAL at 10:01

## 2025-02-20 RX ADMIN — ONDANSETRON 4 MG: 4 TABLET, ORALLY DISINTEGRATING ORAL at 20:31

## 2025-02-20 RX ADMIN — OSELTAMAVIR PHOSPHATE 30 MG: 30 CAPSULE ORAL at 20:31

## 2025-02-20 RX ADMIN — MIRTAZAPINE 15 MG: 15 TABLET, FILM COATED ORAL at 10:01

## 2025-02-20 RX ADMIN — IPRATROPIUM BROMIDE AND ALBUTEROL SULFATE 3 ML: .5; 3 SOLUTION RESPIRATORY (INHALATION) at 20:28

## 2025-02-20 RX ADMIN — ACETYLCYSTEINE 4 ML: 200 SOLUTION ORAL; RESPIRATORY (INHALATION) at 20:28

## 2025-02-20 RX ADMIN — ACETYLCYSTEINE 4 ML: 200 SOLUTION ORAL; RESPIRATORY (INHALATION) at 03:37

## 2025-02-20 ASSESSMENT — ACTIVITIES OF DAILY LIVING (ADL)
ADLS_ACUITY_SCORE: 64

## 2025-02-20 NOTE — PLAN OF CARE
"Goal Outcome Evaluation:      Plan of Care Reviewed With: patient    Overall Patient Progress: improvingOverall Patient Progress: improving  Patient Name: Roxann  MRN: [id number]  Date of Admission: 2/18/2025  Reason for Admission: Influenza B   Level of Care: [facility]     Vitals:   BP Readings from Last 1 Encounters:   02/20/25 122/60     Pulse Readings from Last 1 Encounters:   02/20/25 71     Wt Readings from Last 1 Encounters:   02/18/25 59 kg (130 lb)     Ht Readings from Last 1 Encounters:   02/18/25 1.549 m (5' 1\")     Estimated body mass index is 24.56 kg/m  as calculated from the following:    Height as of this encounter: 1.549 m (5' 1\").    Weight as of this encounter: 59 kg (130 lb).  Temp Readings from Last 1 Encounters:   02/20/25 98.1  F (36.7  C) (Axillary)       Pain: Pt denies pain     CV Surgery Patient: No    Assessment    Resp: 2 L oxymask/ NC, LS coarse, productive cough   Telemetry: 100% V-paced   Neuro: Aox3, disorientated to situation, Ativan x1 PO for anxiety   GI/: NPO, Purewick w minimal UO, kosher diet when able to eat, BM-  Skin/Wounds: Scattered bruising, L ankle bruise   Lines/Drains: R PIV SL   Activity: A1 Wgb   Sleep: Good     Aggression Stop Light: Green          Patient Care Plan: Monitor, speech consult, wean O2 if possible, increase physical activity      "

## 2025-02-20 NOTE — PROGRESS NOTES
02/20/25 0952   Appointment Info   Signing Clinician's Name / Credentials (PT) Astrid Richardson, PT, DPT   Living Environment   People in Home child(jocelyn), adult   Current Living Arrangements house   Home Accessibility no concerns   Transportation Anticipated family or friend will provide   Living Environment Comments Pt reports she has no stairs at home and always has family home with her   Self-Care   Usual Activity Tolerance moderate   Current Activity Tolerance poor   Regular Exercise No   Equipment Currently Used at Home walker, rolling;wheelchair, manual   Fall history within last six months yes   Number of times patient has fallen within last six months 1   Activity/Exercise/Self-Care Comment Pt had one fall in bathroom which led to hospital admission, uses FWW for ambulation around home, has w/c if going out of home for longer distances, always has family available to assist as needed.   General Information   Onset of Illness/Injury or Date of Surgery 02/18/25   Referring Physician Saurabh Red MD   Patient/Family Therapy Goals Statement (PT) To go home, pt is hopeful to discharge to home tomorrow   Pertinent History of Current Problem (include personal factors and/or comorbidities that impact the POC) Pt is 89 year old female adm on 2/18/25 with cough, dyspnea, and fever, fall at home. Pt found to have acute hypoxemic respiratory failure and bilateral pna due to acute influenza B. Pt had RRT on 2/18/25 for worsening respiratory status and concerns for aspiration. Pt was seen by SLP and recommended puree diet with thin liquids. PMH includes multiple myeloma (on chemotherapy), high grade AV block, PPM, paroxysmal a-fib   Existing Precautions/Restrictions fall;oxygen therapy device and L/min  (3 LPM via oxymask)   Cognition   Affect/Mental Status (Cognition) WFL   Orientation Status (Cognition) oriented to;person;place   Follows Commands (Cognition) follows one-step commands   Cognitive Status  Comments Needs reassurance and encouragement   Pain Assessment   Patient Currently in Pain No   Integumentary/Edema   Integumentary/Edema Comments Slight LE edema noted, no pitting   Posture    Posture Forward head position;Protracted shoulders   Range of Motion (ROM)   Range of Motion ROM is WFL   Strength (Manual Muscle Testing)   Strength (Manual Muscle Testing) Deficits observed during functional mobility   Strength Comments Pt is quite deconditioned overall, no focal weakness in LEs   Bed Mobility   Comment, (Bed Mobility) SBA   Transfers   Comment, (Transfers) CGA   Gait/Stairs (Locomotion)   Comment, (Gait/Stairs) CGA with FWW   Balance   Balance Comments Slightly unsteady but no overt LOB   Clinical Impression   Criteria for Skilled Therapeutic Intervention Yes, treatment indicated   PT Diagnosis (PT) Impaired mobility   Influenced by the following impairments Decreased strength, decreased balance, decreased activity tolerance   Functional limitations due to impairments Decreased independence with functional mobility tasks   Clinical Presentation (PT Evaluation Complexity) evolving   Clinical Presentation Rationale Current presentation, [facility]   Clinical Decision Making (Complexity) low complexity   Planned Therapy Interventions (PT) balance training;bed mobility training;gait training;home exercise program;patient/family education;strengthening;transfer training;wheelchair management/propulsion training;progressive activity/exercise   Risk & Benefits of therapy have been explained evaluation/treatment results reviewed;care plan/treatment goals reviewed;risks/benefits reviewed;current/potential barriers reviewed;participants voiced agreement with care plan;participants included;patient   PT Total Evaluation Time   PT Eval, Low Complexity Minutes (46466) 10   Physical Therapy Goals   PT Frequency Daily   PT Predicted Duration/Target Date for Goal Attainment 02/28/25   PT Goals Bed Mobility;Transfers;Gait   PT:  Bed Mobility Independent;Supine to/from sit;Rolling   PT: Transfers Modified independent;Sit to/from stand;Bed to/from chair;Assistive device   PT: Gait Modified independent;100 feet;Rolling walker   Interventions   Interventions Quick Adds Gait Training;Therapeutic Activity;Therapeutic Procedure   Therapeutic Activity   Therapeutic Activities: dynamic activities to improve functional performance Minutes (17641) 25   Symptoms Noted During/After Treatment Fatigue;Shortness of breath;Dizziness   Treatment Detail/Skilled Intervention Pt sitting upright in bed on arrival, agreeable to participate. Pt was pleasant and cooperative, reporting she is starting to feel better but understands it will be a week or two before she really starts to feel better. Pt moves supine to sit at EOB with SBA, cues for safety as pt does endorse dizziness with position change. BP assessed and stable 130's/70's both supine and sitting at EOB. Pt's O2 sats remained around 94% with sitting. Pt sit to stand from EOB with CGA, height of bed elevated. Pt able to ambulate 10' with FWW and CGA mainly for line management. Pt stand to sit in chair with decreased eccentric control. Pt then participates in repeated sit to stand transfers x3 from chair with CGA, verbal cues for hand placement. Pt's family arrived at this point and pt then stating she doesn't feel well, is very fatigued, wants to get back to bed. Pt's O2 sats did drop to 86% when pt removed mask to have family member swab her mouth, recovered with return of mask and cues for breathing technique. With max encouragement, pt agreeable to try staying up in chair for a little longer, especially as pt's goal is to discharge to home. RN updated. At completion of session pt OOB in chair, needs within reach, family at bedside, chair alarm activated.   PT Discharge Planning   PT Plan General strengthening, bed mobility, repeated sit to stand transfers, ambulation household distances   PT Discharge  Recommendation (DC Rec) home with assist;home with home care physical therapy;Transitional Care Facility   PT Rationale for DC Rec Pt is below baseline level of funciton, is limited currently by significantly decreased activity tolerance. Anticipate pt could discharge to home but would need 24 hour hands on support for mobility tasks and assist with ADLs and household tasks, would benefit from continued PT as well to further address deficits and optimize functional recovery. If this level of assist cannot be provided in home environment then recommend TCU to further address deficits and optimize funcitonal recovery. Pt most wants to return home and appears to have supportive family.   PT Brief overview of current status Goals of therapy will be to address safe mobility and make recs for d/c to next level of care. Pt and RN will continue to follow all falls risk precautions as documented by RN staff while hospitalized   PT Total Distance Amb During Session (feet) 10   Physical Therapy Time and Intention   Timed Code Treatment Minutes 25   Total Session Time (sum of timed and untimed services) 35

## 2025-02-20 NOTE — PROGRESS NOTES
"CLINICAL SWALLOW EVALUATION     02/20/25 0919   Appointment Info   Signing Clinician's Name / Credentials (SLP) Ángela [facility]   General Information   Onset of Illness/Injury or Date of Surgery 02/18/25   Referring Physician Saurabh Red MD   Patient/Family Therapy Goal Statement (SLP) Patient wants to eat/drink and feels very thirsty.   Pertinent History of Current Problem Per provider note: \"Quiana Dunaway is a 89 year old woman with PMH most notable for Multiple Myeloma (on chemotherapy), h/o high grade AV block (s/p PM), paroxysmal atrial fibrillation (not on anticoagulation) who presents with acute cough, dyspnea, and fever, and is found to have acute hypoxemic respiratory failure and bilateral pneumonia due to acute Influenza B, as well as elevated Troponin, admitted inpatient 2/18/25. Evaluated by RRT 2/18 for worsening shortness of breath, had NT suction; repeat chest x-ray with no acute airspace disease or effusion; was also noted to be hypotensive and was given 250 ML IV fluid bolus; NICOM assessment noted fluid responsive, was given another liter fluid bolus  - reports clinically feeling better; T max 103 F; currently on 2 L oxygen  - later in the day patient noted with acute congestion/coarse breath sounds with some concern for aspiration.\" Clinical swallow evaluation completed per MD order. CXR 2/19/2025: \" No pleural fluid or pneumothorax. Reticular interstitial opacities could be seen with edema or atypical infection.\"   General Observations Patient awake, pleasant, cooperative. Patient appeared short of breath and weak/deconditioned.   Type of Evaluation   Type of Evaluation Swallow Evaluation   Oral Motor   Oral Musculature generally intact   Structural Abnormalities none present   Mucosal Quality dry   Dentition (Oral Motor)   Comment, Dentition (Oral Motor) Edentulous on top; 3 bottom teeth present. Patient no longer has dentures.   Dentition (Oral Motor) significant number of " missing teeth;edentulous   Lip Function (Oral Motor)   Lip Range of Motion (Oral Motor) WNL   Lip Strength (Oral Motor) WFL   Tongue Function (Oral Motor)   Tongue Coordination/Speed (Oral Motor) reduced rate   Tongue ROM (Oral Motor) WNL   Jaw Function (Oral Motor)   Jaw Function (Oral Motor) WNL   Cough/Swallow/Gag Reflex (Oral Motor)   Soft Palate/Velum (Oral Motor) unable/difficult to assess   Volitional Throat Clear/Cough (Oral Motor) reduced strength   Vocal Quality/Secretion Management (Oral Motor)   Vocal Quality (Oral Motor) breathy;hypophonic   Secretion Management (Oral Motor) WNL   Comment, Vocal Quality/Secretion Management (Oral Motor) Occasional congested cough and respirations at baseline.   General Swallowing Observations   Past History of Dysphagia None per EMR review. Patient reported occsaional cough with oral intake (food and liquid) approximately once per week. Patient also reported eating mostly smooth/pureed foods and daughter cuts meats small.   Comment, Secretions/Suctioning RR 24-30   Respiratory Support oxygen mask  (3L)   Current Diet/Method of Nutritional Intake (General Swallowing Observations, NIS) NPO;ice chips   Swallowing Evaluation Clinical swallow evaluation   Clinical Swallow Evaluation   Feeding Assistance frequent cues/help required   Clinical Swallow Evaluation Textures Trialed thin liquids;pureed;minced & moist   Clinical Swallow Eval: Thin Liquid Texture Trial   Mode of Presentation, Thin Liquids spoon;cup;straw;fed by clinician;self-fed   Volume of Liquid or Food Presented 3 oz thin water   Oral Phase of Swallow delayed AP movement  (mild)   Pharyngeal Phase of Swallow coughing/choking  (x1)   Diagnostic Statement One instance of congested cough which appeared similar to baseline. No other coughing occurred after swallows. No throat clearing and vocal quality unchanged.   Clinical Swallow Evaluation: Puree Solid Texture Trial   Mode of Presentation, Puree spoon;fed by  clinician   Volume of Puree Presented 3 bites applesauce   Oral Phase, Puree delayed AP movement;residue in oral cavity  (mild residue)   Pharyngeal Phase, Puree   (no overt aspiraiton signs)   Diagnostic Statement Oral residue cleared with liquid swallows. Suspect oral residue in part due to dry oral cavity.   Clinical Swallow Eval: Minced & Moist   Mode of Presentation spoon;fed by clinician   Volume Presented 3 bites   Oral Phase delayed AP movement;residue in oral cavity  (mild)   Pharyngeal Phase   (no overt aspiration signs)   Diagnostic Statement Oral residue cleared with liquid swallows. Suspect oral residue in part due to dry oral cavity.   Esophageal Phase of Swallow   Esophageal comments Note mild burping on occasion. Patient reported no hx of GERD symptoms.   Swallowing Recommendations   Diet Consistency Recommendations pureed (level 4);thin liquids (level 0)   Supervision Level for Intake 1:1 supervision needed   Mode of Delivery Recommendations bolus size, small;slow rate of intake   Swallowing Maneuver Recommendations alternate food and liquid intake   Monitoring/Assistance Required (Eating/Swallowing) stop eating activities when fatigue is present;monitor for cough or change in vocal quality with intake   Recommended Feeding/Eating Techniques (Swallow Eval) maintain upright posture during/after eating for 30 minutes;provide assist with feeding;set-up and prepare tray   Medication Administration Recommendations, Swallowing (SLP) One at a time with puree and crush if possible as needed   Instrumental Assessment Recommendations instrumental evaluation not recommended at this time   Comment, Swallowing Recommendations SLP to request order as indicated.   General Therapy Interventions   Planned Therapy Interventions Dysphagia Treatment   Dysphagia treatment Modified diet education;Instruction of safe swallow strategies   Clinical Impression   Criteria for Skilled Therapeutic Interventions Met (SLP Eval)  Yes, treatment indicated   SLP Diagnosis Dysphagia   Risks & Benefits of therapy have been explained evaluation/treatment results reviewed;care plan/treatment goals reviewed;risks/benefits reviewed;current/potential barriers reviewed;participants voiced agreement with care plan;participants included;patient   Clinical Impression Comments Clinical swallow evaluation completed per MD order in setting of bilateral pneumonia due to Influenza B and concern for aspiration yesterday. Patient appeared weak/deconditioned and somewhat short of breath currently on 3L oxymask. Oral mech exam remarkable for sparse dentition and edentulous on top, dry oral mucosa and intermittent congested ocughing and respirations at baseline. Patient assessed with thin liquid, puree and minced/moist solid. Note one instance of congested cough after sip of thin water which seemed similar to baseline congested coughing. No other coughing occurred. No throat clearing and vocal quality unchanged. Note slow but functional mastication of minced/moist solid and mild oral residue with puree and minced/moist solid. Oral residue cleared with sips of water. Patient needed assist with feeding due to weakness. Recommend puree diet (IDDSI 4) and thin liquids (IDDSI 0) given direct supervision/assist and swallow strategies (Fully alert and upright position. Small single sips/bites. Slow pace and rest breaks as needed. Alternate liquids/solids). Serve pills one at a time with puree as tolerated and crush pills if possible as needed. Hold PO if signs/symptoms of aspiration or decline in respiratory status.   SLP Total Evaluation Time   Eval: oral/pharyngeal swallow function, clinical swallow Minutes (97160) 10   SLP Goals   Therapy Frequency (SLP Eval) 5 times/week   SLP Predicted Duration/Target Date for Goal Attainment 03/05/25   SLP Goals Swallow       Interventions   Interventions Quick Adds Swallowing Dysfunction   Swallowing Intervention               SLP  Discharge Planning       SLP Discharge Recommendation home with assist;Transitional Care Facility   SLP Rationale for DC Rec Below baseline for swallowing, although may meet swallow related goals prior to discharge.   SLP Brief overview of current status  Recommend puree diet (IDDSI 4) and thin liquids (IDDSI 0) given direct supervision/assist and swallow strategies (Fully alert and upright position. Small single sips/bites. Slow pace and rest breaks as needed. Alternate liquids/solids). Serve pills one at a time with puree as tolerated and crush pills if possible as needed. Hold PO if signs/symptoms of aspiration or decline in respiratory status. SLP to follow.   SLP Time and Intention   Total Session Time (sum of timed and untimed services) 20

## 2025-02-20 NOTE — PROGRESS NOTES
[facility]  Hospitalist Progress Note        Saurabh Red MD   02/20/2025        Interval History:        - Reports feeling better; respiratory status stable on 3 L oxygen; might need home oxygen  - hoping to go home on 2/21; PT evaluated and note patient below baseline level of function as suggested could discharge home but with need 24 hour assistance; she declines any recommendations for TCU placement; care coordinator following for disposition  - evaluated by SLP and started on puréed dysphagia diet; will need outpatient SLP evaluation         Assessment and Plan:        Quiana Dunaway is a 89 year old woman with PMH most notable for Multiple Myeloma (on chemotherapy), h/o high grade AV block (s/p PM), paroxysmal atrial fibrillation (not on anticoagulation) who presents with acute cough, dyspnea, and fever, and is found to have acute hypoxemic respiratory failure and bilateral pneumonia due to acute Influenza B, as well as elevated Troponin, admitted inpatient 2/18/25.    Acute hypoxemic respiratory failure and bilateral pneumonia due to acute Influenza B:  Dysphagia   - initial workup with BNP 3598, normal lactic acid; elevated Pro calcitonin 0.77; serial troponin with no significant trend 61--- 62  -blood cultures from 2/18 with no growth so far  -influenza B positive; COVID and RSV negative  -chest x-ray 2/18 with pulmonary venous congestion and mild increased lower lung interstitial markings, without pleural effusion or pneumothorax  - Evaluated by RRT 2/18 for worsening shortness of breath, had NT suction; repeat chest x-ray with no acute airspace disease or effusion; was also noted to be hypotensive and was given 250 ML IV fluid bolus; NICOM assessment noted fluid responsive, was given another liter fluid bolus     - Will continue with Tamiflu, also added Rocephin and azithromycin for likely superimposed pneumonia (2/18)  - continue duo nebs Q4 hours  - PRN cough medication  -  encourage incentive spirometry; wean oxygen as able; currently on 2-3 L oxygen; might need home oxygen  - on 2/19: noted with acute congestion/coarse breath sounds with some concern for aspiration--- scheduled mucomyst nebs (with bronchodilators) Q4 hours  - evaluated by SLP and started on puréed dysphagia diet; will need outpatient SLP evaluation    Abnormal ECHO, 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  likely type II non-STEMI due to influenza  paroxysmal atrial fibrillation (decline anticoagulation)  H/o high-grade AV block (s/p pacemaker placement)  Hypertension  - serial troponin with no significant trend 62---61; denies any chest pain; EKG with paced rhythm  - ECHO 2/18/25 for evaluation of hypotension noted LVEF 50-55%, distal septal and apical wall hypokinesia- this could reflect conduction abnormality/pacemaker activation  - cardiology consulted (2/19), suspect Takotsubo cardiomyopathy but LAD/multivessel CAD also in differential; patient prefers to avoid invasive procedure; cards suggested for conservative management--- to repeat ECHO in 1 month (ordered) and to consider ischemic evaluation if wall motion abnormalities do not improve  - to follow up with cardiology outpatient  - continue PTA aspirin 81 mg daily  - takes Lasix only PRN, will hold for now     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  She is currently prescribed Pomalyst 2 mg (14 days on, 14 days off), Daratumumab monthly, and Dexamethasone 4 mg weekly   - recent baseline hemoglobin around 10 to 11  - on presentation Hb 9.9---->7.8---7.9, stable; no suggestion of active bleed; likely some dilutional  component with IV fluid boluses for hypotension; will monitor CBC intermittently     Chronic anxiety:   - continue PTA Lexapro , Remeron    Hyperthyroidism   - continue PTA Methimazole  - last TSH from 12/31 was normal at 3.49    Diet: Snacks/Supplements Adult: Ensure Enlive; Between Meals  Combination Diet  "Pureed Diet (level 4); Thin Liquids (level 0) (Direct assist/supervision. Fully alert and upright position. Small single sips/bites. Slow pace and rest breaks as needed. Alternate liquids/solids. Hold PO if signs/symptoms of aspir...      DVT Prophylaxis: mechanical with PCD boots    Code status: full code    Disposition:   Medically Ready for Discharge: Anticipated Tomorrow pending clinical stability-   - hoping to go home on 2/21; PT evaluated and note patient below baseline level of function as suggested could discharge home but with need 24 hour assistance; she declines any recommendations for TCU placement; care coordinator following for disposition    Care plan discussed with patient and her daughter-in-law present in room     Clinically Significant Risk Factors                   # Thrombocytopenia: Lowest platelets = 110 in last 2 days, will monitor for bleeding       # Hypertension: Noted on problem list    # Chronic heart failure with preserved ejection fraction: heart failure noted on problem list and last echo with EF >50%               # Financial/Environmental Concerns: none   # Pacemaker present                        Physical Exam:      Blood pressure 111/56, pulse 67, temperature 98.4  F (36.9  C), temperature source Axillary, resp. rate 23, height 1.549 m (5' 1\"), weight 59 kg (130 lb), SpO2 97%, not currently breastfeeding.  Vitals:    02/18/25 0428   Weight: 59 kg (130 lb)     Vital Signs with Ranges  Temp:  [98.1  F (36.7  C)-100  F (37.8  C)] 98.4  F (36.9  C)  Pulse:  [67-86] 67  Resp:  [23-34] 23  BP: (111-147)/(53-99) 111/56  SpO2:  [92 %-97 %] 97 %  I/O's Last 24 hours  I/O last 3 completed shifts:  In: 550 [P.O.:550]  Out: -     Constitutional: Alert, awake and oriented; resting comfortably in no apparent distress       Oral cavity: Moist mucosa   Cardiovascular: Normal s1 s2, regular rate and rhythm, no murmur   Lungs: coarse breath sounds bilaterally ; right>left; no significant wheezing "   Abdomen: Soft, nt, nd, no guarding, rigidity or rebound; BS +   LE : No edema   Musculoskeletal/Neuro Power 5/5 in all extremities; No focal neurological deficits noted   Psychiatry: normal mood and affect                Medications:        Current Facility-Administered Medications   Medication Dose Route Frequency Provider Last Rate Last Admin    acetylcysteine (MUCOMYST) 20 % nebulizer solution 4 mL  4 mL Nebulization Q4H Saurabh Red MD   4 mL at 02/20/25 0747    acyclovir (ZOVIRAX) tablet 400 mg  400 mg Oral BID Saurabh Red MD   400 mg at 02/19/25 2000    aspirin (ASA) chewable tablet 81 mg  81 mg Oral Daily Saurabh Red MD   81 mg at 02/19/25 0839    azithromycin (ZITHROMAX) 250 mg in sodium chloride 0.9 % 250 mL intermittent infusion  250 mg Intravenous Q24H Saurabh eRd MD   250 mg at 02/19/25 0839    cefTRIAXone (ROCEPHIN) 2 g vial to attach to  ml bag for ADULTS or NS 50 ml bag for PEDS  2 g Intravenous Q24H Saurabh Red MD 0 mL/hr at 02/18/25 0910 2 g at 02/19/25 0712    [START ON 2/23/2025] dexAMETHasone (DECADRON) tablet 4 mg  4 mg Oral Weekly Saurabh Red MD        escitalopram (LEXAPRO) tablet 5 mg  5 mg Oral Daily Saurabh Red MD   5 mg at 02/19/25 0840    famotidine (PEPCID) tablet 20 mg  20 mg Oral Daily Mohit Shoemaker MD   20 mg at 02/19/25 0839    ipratropium - albuterol 0.5 mg/2.5 mg/3 mL (DUONEB) neb solution 3 mL  3 mL Nebulization Q4H While awake Mohit Shoemaker MD   3 mL at 02/20/25 0747    methimazole (TAPAZOLE) half-tab 2.5 mg  2.5 mg Oral QPM Saurabh Red MD   2.5 mg at 02/19/25 2000    mirtazapine (REMERON) tablet 15 mg  15 mg Oral Daily Saurabh Red MD        oseltamivir (TAMIFLU) capsule 30 mg  30 mg Oral BID Mohit Shoemaker MD   30 mg at 02/19/25 2000    sodium chloride (PF) 0.9% PF flush 3 mL  3 mL Intracatheter Q8H Mohit Shoemaker MD   3 mL at 02/19/25 7258     PRN  Meds:   Current Facility-Administered Medications   Medication Dose Route Frequency Provider Last Rate Last Admin    acetaminophen (TYLENOL) tablet 650 mg  650 mg Oral Q4H PRN Mohit Shoemaker MD   650 mg at 02/19/25 0418    Or    acetaminophen (TYLENOL) Suppository 650 mg  650 mg Rectal Q4H PRN Mohit Shoemaker MD   650 mg at 02/18/25 1236    albuterol (PROVENTIL) neb solution 2.5 mg  2.5 mg Nebulization Q2H PRN Mohit Shoemaker MD        benzocaine-menthol (CHLORASEPTIC) 6-10 MG lozenge 1 lozenge  1 lozenge Buccal Q1H PRMohit Reese MD        calcium carbonate (TUMS) chewable tablet 1,000 mg  1,000 mg Oral 4x Daily PRN Mohit Shoemaker MD        guaiFENesin-dextromethorphan (ROBITUSSIN DM) 100-10 MG/5ML syrup 10 mL  10 mL Oral Q4H PRN Mohit Shoemaker MD        lidocaine (LMX4) cream   Topical Q1H PRN Mohit Shoemaker MD        lidocaine 1 % 0.1-1 mL  0.1-1 mL Other Q1H PRN Mohit Shoemaker MD        LORazepam (ATIVAN) tablet 0.5 mg  0.5 mg Oral Q6H PRN Saurabh Red MD   0.5 mg at 02/19/25 2152    melatonin tablet 3 mg  3 mg Oral At Bedtime PRN Mohit Shoemaker MD        ondansetron (ZOFRAN ODT) ODT tab 4 mg  4 mg Oral Q6H PRN Mohit Shoemaker MD   4 mg at 02/19/25 1329    Or    ondansetron (ZOFRAN) injection 4 mg  4 mg Intravenous Q6H PRN Mohit Shoemaker MD        senna-docusate (SENOKOT-S/PERICOLACE) 8.6-50 MG per tablet 1 tablet  1 tablet Oral BID PRN Mohit Shoemaker MD        Or    senna-docusate (SENOKOT-S/PERICOLACE) 8.6-50 MG per tablet 2 tablet  2 tablet Oral BID PRN Mohit Shoemaker MD        sodium chloride (PF) 0.9% PF flush 3 mL  3 mL Intracatheter q1 min prn Mohit Shoemaker Ross, MD                Data:      All new lab and imaging data was reviewed.   Recent Labs   Lab Test 02/20/25  0647 02/19/25  0610 02/18/25  0405   WBC 4.0 3.2* 5.4   HGB 7.9* 7.8* 9.9*   MCV 91 88 89   * 110* 115*      Recent  Labs   Lab Test 02/19/25  0610 02/18/25  0405 01/28/25  0913    137 143   POTASSIUM 3.6 4.1 3.7   CHLORIDE 106 99 107   CO2 25 25 27   BUN 16.7 19.8 21.3   CR 0.64 0.77 0.73   ANIONGAP 8 13 9   HUMBERTO 7.5* 8.3* 8.9   GLC 90 112* 90     Recent Labs   Lab Test 05/25/20  0904 05/25/20  0430 05/25/20  0039   TROPI 0.041 0.051* 0.068*

## 2025-02-21 VITALS
OXYGEN SATURATION: 90 % | WEIGHT: 130 LBS | RESPIRATION RATE: 24 BRPM | DIASTOLIC BLOOD PRESSURE: 69 MMHG | HEIGHT: 61 IN | BODY MASS INDEX: 24.55 KG/M2 | SYSTOLIC BLOOD PRESSURE: 141 MMHG | TEMPERATURE: 98.6 F | HEART RATE: 80 BPM

## 2025-02-21 PROCEDURE — 258N000003 HC RX IP 258 OP 636: Performed by: HOSPITALIST

## 2025-02-21 PROCEDURE — 250N000011 HC RX IP 250 OP 636: Performed by: HOSPITALIST

## 2025-02-21 PROCEDURE — 250N000009 HC RX 250: Performed by: HOSPITALIST

## 2025-02-21 PROCEDURE — 250N000013 HC RX MED GY IP 250 OP 250 PS 637: Performed by: HOSPITALIST

## 2025-02-21 PROCEDURE — 99239 HOSP IP/OBS DSCHRG MGMT >30: CPT | Performed by: HOSPITALIST

## 2025-02-21 PROCEDURE — 94640 AIRWAY INHALATION TREATMENT: CPT

## 2025-02-21 PROCEDURE — 94640 AIRWAY INHALATION TREATMENT: CPT | Mod: 76

## 2025-02-21 PROCEDURE — 999N000157 HC STATISTIC RCP TIME EA 10 MIN

## 2025-02-21 RX ORDER — IPRATROPIUM BROMIDE AND ALBUTEROL SULFATE 2.5; .5 MG/3ML; MG/3ML
3 SOLUTION RESPIRATORY (INHALATION) EVERY 4 HOURS PRN
Qty: 90 ML | Refills: 0 | Status: SHIPPED | OUTPATIENT
Start: 2025-02-21

## 2025-02-21 RX ORDER — OSELTAMIVIR PHOSPHATE 30 MG/1
30 CAPSULE ORAL 2 TIMES DAILY
Qty: 3 CAPSULE | Refills: 0 | Status: SHIPPED | OUTPATIENT
Start: 2025-02-21 | End: 2025-02-23

## 2025-02-21 RX ORDER — CEFUROXIME AXETIL 500 MG/1
500 TABLET ORAL 2 TIMES DAILY
Qty: 6 TABLET | Refills: 0 | Status: SHIPPED | OUTPATIENT
Start: 2025-02-22 | End: 2025-02-25

## 2025-02-21 RX ORDER — GUAIFENESIN/DEXTROMETHORPHAN 100-10MG/5
10 SYRUP ORAL EVERY 4 HOURS PRN
Qty: 236 ML | Refills: 0 | Status: SHIPPED | OUTPATIENT
Start: 2025-02-21

## 2025-02-21 RX ADMIN — ACETYLCYSTEINE 4 ML: 200 SOLUTION ORAL; RESPIRATORY (INHALATION) at 15:40

## 2025-02-21 RX ADMIN — MIRTAZAPINE 15 MG: 15 TABLET, FILM COATED ORAL at 08:16

## 2025-02-21 RX ADMIN — ACETYLCYSTEINE 4 ML: 200 SOLUTION ORAL; RESPIRATORY (INHALATION) at 03:08

## 2025-02-21 RX ADMIN — FAMOTIDINE 20 MG: 20 TABLET, FILM COATED ORAL at 08:16

## 2025-02-21 RX ADMIN — ESCITALOPRAM OXALATE 5 MG: 5 TABLET, FILM COATED ORAL at 08:16

## 2025-02-21 RX ADMIN — IPRATROPIUM BROMIDE AND ALBUTEROL SULFATE 3 ML: .5; 3 SOLUTION RESPIRATORY (INHALATION) at 03:07

## 2025-02-21 RX ADMIN — ASPIRIN 81 MG CHEWABLE TABLET 81 MG: 81 TABLET CHEWABLE at 08:16

## 2025-02-21 RX ADMIN — OSELTAMAVIR PHOSPHATE 30 MG: 30 CAPSULE ORAL at 08:16

## 2025-02-21 RX ADMIN — ACYCLOVIR 400 MG: 400 TABLET ORAL at 08:16

## 2025-02-21 RX ADMIN — IPRATROPIUM BROMIDE AND ALBUTEROL SULFATE 3 ML: .5; 3 SOLUTION RESPIRATORY (INHALATION) at 15:38

## 2025-02-21 RX ADMIN — IPRATROPIUM BROMIDE AND ALBUTEROL SULFATE 3 ML: .5; 3 SOLUTION RESPIRATORY (INHALATION) at 11:50

## 2025-02-21 RX ADMIN — CEFTRIAXONE SODIUM 2 G: 2 INJECTION, POWDER, FOR SOLUTION INTRAMUSCULAR; INTRAVENOUS at 09:51

## 2025-02-21 RX ADMIN — ACETYLCYSTEINE 4 ML: 200 SOLUTION ORAL; RESPIRATORY (INHALATION) at 11:50

## 2025-02-21 RX ADMIN — AZITHROMYCIN MONOHYDRATE 250 MG: 500 INJECTION, POWDER, LYOPHILIZED, FOR SOLUTION INTRAVENOUS at 08:10

## 2025-02-21 ASSESSMENT — ACTIVITIES OF DAILY LIVING (ADL)
ADLS_ACUITY_SCORE: 64
ADLS_ACUITY_SCORE: 67
ADLS_ACUITY_SCORE: 64
ADLS_ACUITY_SCORE: 67
ADLS_ACUITY_SCORE: 64
ADLS_ACUITY_SCORE: 64
ADLS_ACUITY_SCORE: 70
ADLS_ACUITY_SCORE: 70
ADLS_ACUITY_SCORE: 64
ADLS_ACUITY_SCORE: 64
ADLS_ACUITY_SCORE: 70
ADLS_ACUITY_SCORE: 70
ADLS_ACUITY_SCORE: 67
ADLS_ACUITY_SCORE: 64
ADLS_ACUITY_SCORE: 64
ADLS_ACUITY_SCORE: 67

## 2025-02-21 NOTE — CARE PLAN
02/21/25 1006   Home Oxygen Assessment (RN/RT ONLY)   Does patient have oxygen at home? No   1. SpO2 on room air at rest while awake 87       Oxygen LPM at rest 3   3. SpO2 on room air during Activity/with exercise 85   4. SpO2 with oxygen during activity/with exercise 90       Oxygen LPM during activity/with exercise 3   Does patient qualify for Home O2? Yes

## 2025-02-21 NOTE — PROGRESS NOTES
Patient seen and examined    -Reports feeling fine and eager to go home today  -respiratory status stable on 3-4 lts O2; has been afebrile since 2/19  -will arrange for home oxygen and home care PT/OT/RN and SLP  -reports of some loose stools; no abdominal pain; will order PRN Imodium    Discharge home today with home oxygen and home cares    Care plan discussed with patient and care coordinator/    Please see discharge summary for details     Oxygen Documentation   I certify that this patient, Quiana Dunaway has been under my care (or a nurse practitioner or physican's assistant working with me). This is the face-to-face encounter for oxygen medical necessity.      At the time of this encounter, I have reviewed the qualifying testing and have determined that supplemental oxygen is reasonable and necessary and is expected to improve the patient's condition in a home setting.         Patient has continued oxygen desaturation due to Influenza J10.1.     If portability is ordered, is the patient mobile within the home? yes     Was this visit performed as a telehealth visit: No

## 2025-02-21 NOTE — PLAN OF CARE
Cognition/Mentation/Neuro: Aox3-4, intermittently disoriented to situation. Forgetful. Calm cooperative.    VS: VSS on 4L NC. Placed on oxymask for mouth breathing.   Cardiac:  Vpaced.  GI/: Continent/ incontinent, purewick in place while in bed. Reported nausea and requested PRN zofran this past evening.  Pulmonary: LS coarse, productive congested cough. Robitussin given x1.   Pain: Denies   Drains/Lines: SL PIV  Skin: scattered bruises    Activity: A1 GB/ walker   Diet: Puree, thin liquids, kosher  Discharge: Pending

## 2025-02-21 NOTE — PLAN OF CARE
"Patient Name: Roxann  MRN: [id number]  Date of Admission: 2/18/2025  Reason for Admission: SOB, cough   Level of Care: [facility]     Vitals:   BP Readings from Last 1 Encounters:   02/21/25 (!) 141/69     Pulse Readings from Last 1 Encounters:   02/21/25 80     Wt Readings from Last 1 Encounters:   02/18/25 59 kg (130 lb)     Ht Readings from Last 1 Encounters:   02/18/25 1.549 m (5' 1\")     Estimated body mass index is 24.56 kg/m  as calculated from the following:    Height as of this encounter: 1.549 m (5' 1\").    Weight as of this encounter: 59 kg (130 lb).  Temp Readings from Last 1 Encounters:   02/21/25 98.6  F (37  C) (Axillary)   Pain: Pain goal 0/10 Pain Rating 0/10     Assessment  Resp: LS coarse, on 3L NC    Telemetry: V paced   Neuro: A&Ox4  GI/: BS +, x0 BM, Pt having adequate urine output throughout shift   Skin/Wounds: ecchymosis scattered   Activity: x1 GB walker    Goal Outcome Evaluation:    Plan of Care Reviewed With: patient, child  Overall Patient Progress: improving  Outcome Evaluation: d/c today    Discharge instruction reviewed with pt and daughter in law and son. All questions answered, pt verbalizes understanding. Belongings returned at discharge. Prescription medications reviewed with pt and daughter . Pt discharged at 1640 to home with daughter in law and son. Discharge appointments reviewed with pt and daughter in law and son. Education provided about nebs and home oxygen       "

## 2025-02-21 NOTE — PLAN OF CARE
"Speech Language Therapy Discharge Summary    Reason for therapy discharge:    Discharged to home with  SLP.    Progress towards therapy goal(s). See goals on Care Plan in [facility] electronic health record for goal details.  Goals partially met.  Barriers to achieving goals:   discharge from facility.    Therapy recommendation(s):    Continued therapy is recommended.  Rationale/Recommendations:  SLP for dysphagia rehab, ADAT to baseline soft/bite sized.    Per evaluating SLP: \"Patient also reported eating mostly smooth/pureed foods and daughter cuts meats small.\"    \"Recommend puree diet (IDDSI 4) and thin liquids (IDDSI 0) given direct supervision/assist and swallow strategies (Fully alert and upright position. Small single sips/bites. Slow pace and rest breaks as needed. Alternate liquids/solids). Serve pills one at a time with puree as tolerated and crush pills if possible as needed. Hold PO if signs/symptoms of aspiration or decline in respiratory status. SLP to follow. \"    *Pt not seen by discharging therapist on this date, note written based on previous treating therapist's notes and recommendations     "

## 2025-02-21 NOTE — DISCHARGE SUMMARY
[facility]  Discharge Summary        Quiana Dunaway MRN# [id number]   YOB: 1935 Age: 89 year old     Date of Admission:  2/18/2025  Date of Discharge:  2/21/2025  Admitting Physician:  Mohit Shoemaker MD  Discharge Physician: Saurabh Red MD  Discharging Service: Hospitalist     Primary Provider: César Chung  Primary Care Physician Phone Number: [phone]         Discharge Diagnoses/Problem Oriented Hospital Course (Providers):    Quiana Dunaway was admitted on 2/18/2025 by Mohit Shoemaker MD and I would refer you to their history and physical.  The following problems were addressed during her hospitalization:    Quiana Dunaway is a 89 year old woman with PMH most notable for Multiple Myeloma (on chemotherapy), h/o high grade AV block (s/p PM), paroxysmal atrial fibrillation (not on anticoagulation) who presents with acute cough, dyspnea, and fever, and is found to have acute hypoxemic respiratory failure and bilateral pneumonia due to acute Influenza B, as well as elevated Troponin, admitted inpatient 2/18/25.     Acute hypoxemic respiratory failure and bilateral pneumonia due to acute Influenza B:  Dysphagia   - initial workup with BNP 3598, normal lactic acid; elevated Pro calcitonin 0.77; serial troponin with no significant trend 61--- 62  -blood cultures from 2/18 with no growth so far  -influenza B positive; COVID and RSV negative  -chest x-ray 2/18 with pulmonary venous congestion and mild increased lower lung interstitial markings, without pleural effusion or pneumothorax  - Evaluated by RRT 2/18 for worsening shortness of breath, had NT suction; repeat chest x-ray with no acute airspace disease or effusion; was also noted to be hypotensive and was given 250 ML IV fluid bolus; NICOM assessment noted fluid responsive, was given another liter fluid bolus     - continue with Tamiflu to complete 5 days course, also added Rocephin and azithromycin  for likely superimposed pneumonia (2/18)-- switched to PO Ceftin for discharge to complete seven days course  -order for duo nebs Q4 hours PRN on discharge  - PRN cough medication  - encourage incentive spirometry; wean oxygen as able; currently on 3 L oxygen; ordered for home oxygen  - evaluated by SLP and started on puréed dysphagia diet for dysphagia; will need outpatient SLP evaluation     Abnormal ECHO, 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  likely type II non-STEMI due to influenza  paroxysmal atrial fibrillation (decline anticoagulation)  H/o high-grade AV block (s/p pacemaker placement)  Hypertension  - serial troponin with no significant trend 62---61; denies any chest pain; EKG with paced rhythm  - ECHO 2/18/25 for evaluation of hypotension noted LVEF 50-55%, distal septal and apical wall hypokinesia- this could reflect conduction abnormality/pacemaker activation  - cardiology consulted (2/19), suspect Takotsubo cardiomyopathy but LAD/multivessel CAD also in differential; patient prefers to avoid invasive procedure; cards suggested for conservative management--- to repeat ECHO in 1 month (ordered) and to consider ischemic evaluation if wall motion abnormalities do not improve  - to follow up with cardiology outpatient  - continue PTA aspirin 81 mg daily  - takes Lasix only PRN     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  She is currently prescribed Pomalyst 2 mg (14 days on, 14 days off), Daratumumab monthly, and Dexamethasone 4 mg weekly   - recent baseline hemoglobin around 10 to 11  - on presentation Hb 9.9---->7.8---7.9, stable; no suggestion of active bleed; likely some dilutional  component with IV fluid boluses for hypotension; will monitor CBC with PCP follow up     Chronic anxiety:   - continue PTA Lexapro , Remeron     Hyperthyroidism   - continue PTA Methimazole  - last TSH from 12/31 was normal at 3.49     Diet: Snacks/Supplements Adult: Ensure Enlive;  Between Meals  Combination Diet Kosher Diet; Pureed Diet (level 4); Thin Liquids (level 0) (Direct assist/supervision. Fully alert and upright position. Small single sips/bites. Slow pace and rest breaks as needed. Alternate liquids/solids.      Code status: full code     Disposition:   Medically Ready for Discharge:   - PT evaluated and note patient below baseline level of function and suggested could discharge home but with need 24 hour assistance; she declines any recommendations for TCU placement; care coordinator following for disposition  -ordered for home care PT/OT/RN/SLP       Clinically Significant Risk Factors                   # Thrombocytopenia: Lowest platelets = 128 in last 2 days, will monitor for bleeding       # Hypertension: Noted on problem list  # Chronic heart failure with preserved ejection fraction: heart failure noted on problem list and last echo with EF >50%                 # Financial/Environmental Concerns: none     # Pacemaker present                    Brief Hospital Stay Summary Sent Home With Patient in AVS:        Reason for your hospital stay      You were admitted for evaluation of influenza with likely superimposed   pneumonia.                Pending Results:        Unresulted Labs Ordered in the Past 30 Days of this Admission       Date and Time Order Name Status Description    2/18/2025  3:56 AM Blood Culture Peripheral Blood Preliminary     2/18/2025  3:56 AM Blood Culture Peripheral Blood Preliminary               Discharge Instructions and Follow-Up:      Follow-up Appointments     Follow Up      Follow up with cardiology in 3 to 4 weeks or as suggested with repeat   ECHO.        Hospital Follow-up with Existing Primary Care Provider (PCP)      Please see details below         Schedule Primary Care visit within: 7 Days   Recommended labs and Imaging (to be ordered by Primary Care Provider):   repeat CBC and BMP               Discharge Disposition:      Discharged to home         Discharge Medications:        Current Discharge Medication List        START taking these medications    Details   cefuroxime (CEFTIN) 500 MG tablet Take 1 tablet (500 mg) by mouth 2 times daily for 3 days.  Qty: 6 tablet, Refills: 0    Associated Diagnoses: Community acquired bacterial pneumonia      guaiFENesin-dextromethorphan (ROBITUSSIN DM) 100-10 MG/5ML syrup Take 10 mLs by mouth every 4 hours as needed for cough.  Qty: 236 mL, Refills: 0    Associated Diagnoses: Community acquired bacterial pneumonia      ipratropium - albuterol 0.5 mg/2.5 mg/3 mL (DUONEB) 0.5-2.5 (3) MG/3ML neb solution Take 1 vial (3 mLs) by nebulization every 4 hours as needed for shortness of breath, wheezing or cough.  Qty: 90 mL, Refills: 0    Associated Diagnoses: Community acquired bacterial pneumonia      oseltamivir (TAMIFLU) 30 MG capsule Take 1 capsule (30 mg) by mouth 2 times daily for 3 doses.  Qty: 3 capsule, Refills: 0    Associated Diagnoses: Influenza B           CONTINUE these medications which have NOT CHANGED    Details   acyclovir (ZOVIRAX) 400 MG tablet Take 1 tablet (400 mg) by mouth 2 times daily Viral Prophylaxis.  Qty: 180 tablet, Refills: 3    Associated Diagnoses: Multiple myeloma not having achieved remission (H)      aspirin 81 MG chewable tablet Take 81 mg by mouth daily       cyanocobalamin (VITAMIN B-12) 1000 MCG tablet Take 1 tablet (1,000 mcg) by mouth daily  Qty: 90 tablet, Refills: 2    Associated Diagnoses: Vitamin B12 deficiency (non anemic)      Daratumumab (DARZALEX IV) Inject into the vein every 28 days. Q monthly injection, given on Mondays at cancer infusion center      dexAMETHasone (DECADRON) 4 MG tablet Take 1 tablet (4 mg) by mouth once a week  Qty: 16 tablet, Refills: 2    Associated Diagnoses: Multiple myeloma not having achieved remission (H)      escitalopram (LEXAPRO) 5 MG tablet Take 1 tablet (5 mg) by mouth daily.  Qty: 90 tablet, Refills: 3    Associated Diagnoses: ROBER (generalized  anxiety disorder)      famotidine (PEPCID) 40 MG tablet Take 1 tablet (40 mg) by mouth 2 times daily.  Qty: 180 tablet, Refills: 3    Associated Diagnoses: Nausea      furosemide (LASIX) 20 MG tablet Take once daily as needed for swelling or blood pressure greater than 170/100  Qty: 90 tablet, Refills: 3    Comments: Profile Rx: patient will contact pharmacy when needed  Associated Diagnoses: Chronic diastolic congestive heart failure (H)      Loperamide HCl (IMODIUM A-D PO) Take 1 tablet by mouth as needed       LORazepam (ATIVAN) 0.5 MG tablet Take 1 tablet (0.5 mg) by mouth every 6 hours as needed for anxiety.  Qty: 30 tablet, Refills: 3    Associated Diagnoses: Multiple myeloma not having achieved remission (H)      meclizine (ANTIVERT) 25 MG tablet Take 1 tablet (25 mg) by mouth 3 times daily as needed for dizziness  Qty: 270 tablet, Refills: 2    Associated Diagnoses: Dizziness      methimazole (TAPAZOLE) 5 MG tablet Take 0.5 tablets (2.5 mg) by mouth daily.  Qty: 45 tablet, Refills: 3    Associated Diagnoses: Apathetic thyrotoxicosis      mirtazapine (REMERON) 15 MG tablet Take 1 tablet (15 mg) by mouth at bedtime  Qty: 90 tablet, Refills: 3    Associated Diagnoses: Abnormal loss of weight      nitroglycerin (NITROSTAT) 0.4 MG sublingual tablet For chest pain place 1 tablet under the tongue every 5 minutes for 3 doses. If symptoms persist 5 minutes after 1st dose call 911.  Qty: 25 tablet, Refills: 0    Associated Diagnoses: Atypical chest pain      ondansetron (ZOFRAN) 8 MG tablet Take 1 tablet (8 mg) by mouth every 8 hours as needed for nausea  Qty: 30 tablet, Refills: 3    Associated Diagnoses: Nausea      potassium chloride will ER (KLOR-CON M10) 10 MEQ CR tablet Take 1 tablet (10 mEq) by mouth daily  Qty: 90 tablet, Refills: 3    Associated Diagnoses: Chronic diastolic congestive heart failure (H)      prochlorperazine (COMPAZINE) 10 MG tablet Take 1 tablet (10 mg) by mouth every 6 hours as needed  "(Nausea/Vomiting).  Qty: 30 tablet, Refills: 6    Associated Diagnoses: Multiple myeloma not having achieved remission (H)      pomalidomide (POMALYST) 2 MG capsule Take 1 capsule (2 mg) by mouth daily for 14 days Swallow whole, do NOT break, crush, chew or open capsule. Take on days 1-14 of repeated 28 day cycles.  Qty: 14 capsule, Refills: 0    Comments: Changing to 2 weeks on 2 weeks off as of 12/4/24. [id number]  Associated Diagnoses: Multiple myeloma not having achieved remission (H)           STOP taking these medications       triamcinolone (KENALOG) 0.1 % paste Comments:   Reason for Stopping:                 Allergies:         Allergies   Allergen Reactions    Levaquin [Levofloxacin] Other (See Comments)     Tingling in feet after 1 dose on 8/7/19             Consultations This Hospital Stay:      Consultation during this admission received from cardiology        Condition and Physical on Discharge:        Discharge condition: Stable   Vitals: Blood pressure (!) 145/65, pulse 80, temperature 99.1  F (37.3  C), temperature source Oral, resp. rate 24, height 1.549 m (5' 1\"), weight 59 kg (130 lb), SpO2 (!) 89%, not currently breastfeeding.     Constitutional: Alert, awake and oriented; resting comfortably in no apparent distress         Oral cavity: Moist mucosa   Cardiovascular: Normal s1 s2, regular rate and rhythm, no murmur   Lungs: coarse breath sounds bilaterally ; right>left; no significant wheezing   Abdomen: Soft, nt, nd, no guarding, rigidity or rebound; BS +   LE : No edema   Musculoskeletal/Neuro Power 5/5 in all extremities; No focal neurological deficits noted   Psychiatry: normal mood and affect             Discharge Time:      Greater than 30 minutes.        Image Results From This Hospital Stay (For Non-EPIC Providers):        Results for orders placed or performed during the hospital encounter of 02/18/25   XR Chest 2 Views    Narrative    EXAM: XR CHEST 2 VIEWS  LOCATION: [facility]  DATE: 2025    INDICATION: cough hypoxic  COMPARISON: Chest x-ray from 2024.      Impression    IMPRESSION:   1.  Pulmonary venous congestion and mild increased lower lung interstitial markings, the latter may reflect pulmonary edema.  2.  No pleural effusion or pneumothorax.  3.  Normal heart size with implanted left subclavian dual-lead pacemaker.   XR Chest Port 1 View    Narrative    EXAM: XR CHEST PORT 1 VIEW  LOCATION: [facility]  DATE: 2025    INDICATION: Shortness of breath.  COMPARISON: Chest x-ray 2025.      Impression    IMPRESSION:   No acute airspace disease identified. Stable left chest pacer. Normal cardiac silhouette. No effusion or pneumothorax identified.   XR Chest Port 1 View    Narrative    EXAM: XR CHEST PORT 1 VIEW  LOCATION: [facility]  DATE: 2025    INDICATION: Acute hypoxic respiratory failure.  COMPARISON: 2025      Impression    IMPRESSION: No pleural fluid or pneumothorax. Reticular interstitial opacities could be seen with edema or atypical infection. The heart is at the upper limits of normal in size. There is left chest wall pacemaker.   Echocardiogram Complete     Value    LVEF  50-55%    Narrative    [id number]  [id number]  [id number]  [id number]^ESTEPHANIA^CLIVE^ANGEL     [facility]  Echocardiography Laboratory  [address] [place] [address]     Name: DUGLAS CASTILLO  MRN: [id number]  : 1935  Study Date: 2025 12:11 PM  Age: 89 yrs  Gender: Female  Patient Location: [facility]  Reason For Study: CHF  Ordering Physician: CLIVE BEST  Referring Physician: César Chung  Performed By: César Singh     BSA: 1.6 m2  Height: 61 in  Weight: 130 lb  HR: 87  BP: 95/43 mmHg  ______________________________________________________________________________  Procedure  Echocardiogram with two-dimensional, color and spectral Doppler.  Definity (NDC  #[id number]) given intravenously.  ______________________________________________________________________________  Interpretation Summary     The left visual ejection fraction is 50-55%.Distal septal and apical wall  hypokinesia- this could reflect conduction abnormality/pacemaker activation.  The right ventricular systolic function is normal.  There is mild (1+) mitral regurgitation.  IVC diameter <2.1 cm collapsing >50% with sniff suggests a normal RA pressure  of 3 mmHg.     Echocardiogram 05/25/2022 LVEF noted 60 to 65% with no wall motion  abnormalities.  ______________________________________________________________________________  Left Ventricle  The left ventricle is normal in size. Proximal septal thickening is noted.  Diastolic Doppler findings (E/E' ratio and/or other parameters) suggest left  ventricular filling pressures are indeterminate. The visual ejection fraction  is 50-55%. Distal septal and apical wall hypokinesia.     Right Ventricle  The right ventricle is normal size. The right ventricular systolic function is  normal. There is a pacemaker lead in the right ventricle.     Atria  The left atrium is mildly dilated. Right atrial size is normal. There is no  color Doppler evidence of an atrial shunt.     Mitral Valve  There is mild (1+) mitral regurgitation.     Tricuspid Valve  There is trace tricuspid regurgitation. The right ventricular systolic  pressure is approximated at 28.7 mmHg plus the right atrial pressure.     Aortic Valve  The aortic valve is not well visualized. Mild aortic valve sclerosis. No  aortic regurgitation is present. No aortic stenosis is present.     Pulmonic Valve  There is trace pulmonic valvular regurgitation. There is no pulmonic valvular  stenosis.     Vessels  The aortic root is normal size. Normal size ascending aorta. IVC diameter <2.1  cm collapsing >50% with sniff suggests a normal RA pressure of 3 mmHg.     Pericardium  There is no pericardial  effusion.     Rhythm  Sinus rhythm was noted.  ______________________________________________________________________________  MMode/2D Measurements & Calculations  IVSd: 1.0 cm     LVIDd: 3.5 cm  LVIDs: 2.2 cm  LVPWd: 0.90 cm  FS: 37.1 %  LV mass(C)d: 95.9 grams  LV mass(C)dI: 61.0 grams/m2  Ao root diam: 2.8 cm  asc Aorta Diam: 3.3 cm  LVOT diam: 2.0 cm  LVOT area: 3.1 cm2  Ao root diam index Ht(cm/m): 1.8  Ao root diam index BSA (cm/m2): 1.8  Asc Ao diam index BSA (cm/m2): 2.1  Asc Ao diam index Ht(cm/m): 2.1  LA Volume (BP): 29.6 ml     LA Volume Index (BP): 18.9 ml/m2  RV Base: 2.5 cm  RWT: 0.51  TAPSE: 1.1 cm     Doppler Measurements & Calculations  MV E max raad: 97.1 cm/sec  MV A max raad: 132.0 cm/sec  MV E/A: 0.74  MV dec time: 0.23 sec  Ao V2 max: 207.0 cm/sec  Ao max P.0 mmHg  Ao V2 mean: 138.0 cm/sec  Ao mean P.0 mmHg  Ao V2 VTI: 34.8 cm  NUSRAT(I,D): 2.8 cm2  NUSRAT(V,D): 2.7 cm2  LV V1 max P.4 mmHg  LV V1 max: 176.0 cm/sec  LV V1 VTI: 31.0 cm  SV(LVOT): 97.4 ml  SI(LVOT): 61.9 ml/m2  PA acc time: 0.09 sec  TR max raad: 268.0 cm/sec  TR max P.7 mmHg  AV Raad Ratio (DI): 0.85  NUSRAT Index (cm2/m2): 1.8     E/E' av.7  Lateral E/e': 12.4  Medial E/e': 15.0  RV S Raad: 17.6 cm/sec     ______________________________________________________________________________  Report approved by: Otto Rodriguez MD on 2025 01:49 PM           *Note: Due to a large number of results and/or encounters for the requested time period, some results have not been displayed. A complete set of results can be found in Results Review.           Most Recent Lab Results In EPIC (For Non-EPIC Providers):    Most Recent 3 CBC's:  Recent Labs   Lab Test 25  0647 25  0610 25  0405   WBC 4.0 3.2* 5.4   HGB 7.9* 7.8* 9.9*   MCV 91 88 89   * 110* 115*      Most Recent 3 BMP's:  Recent Labs   Lab Test 25  0610 25  0405 25  0913    137 143   POTASSIUM 3.6 4.1 3.7   CHLORIDE 106  99 107   CO2 25 25 27   BUN 16.7 19.8 21.3   CR 0.64 0.77 0.73   ANIONGAP 8 13 9   HUMBERTO 7.5* 8.3* 8.9   GLC 90 112* 90     Most Recent 3 Troponin's:  Recent Labs   Lab Test 05/25/20  0904 05/25/20  0430 05/25/20  0039   TROPI 0.041 0.051* 0.068*     Most Recent 3 INR's:No lab results found.  Most Recent 2 LFT's:  Recent Labs   Lab Test 01/28/25  0913 12/31/24  1226   AST 14 11   ALT 9 6   ALKPHOS 98 90   BILITOTAL 0.3 0.3     Most Recent Cholesterol Panel:  Recent Labs   Lab Test 06/01/21  0744   CHOL 152   LDL 58   HDL 77   TRIG 83     Most Recent 6 Bacteria Isolates From Any Culture (See EPIC Reports for Culture Details):No lab results found.  Most Recent TSH, T4 and HgbA1c:   Recent Labs   Lab Test 12/31/24  1226 04/07/22  1347 03/04/22  0918   TSH 3.49   < > 5.63*   T4  --   --  0.83    < > = values in this interval not displayed.

## 2025-02-21 NOTE — PLAN OF CARE
Physical Therapy Discharge Summary    Reason for therapy discharge:    Discharged to home with home therapy.    Progress towards therapy goal(s). See goals on Care Plan in [facility] electronic health record for goal details.  Goals partially met.  Barriers to achieving goals:   discharge from facility.    Therapy recommendation(s):    Continued therapy is recommended.  Rationale/Recommendations:  home PT to further address deficits and optimize functional independence and safety in home environment.

## 2025-02-21 NOTE — PROGRESS NOTES
Care Management Discharge Note    Discharge Date: 02/21/2025       Discharge Disposition:  Home    Discharge Services:  Home care RN/PT/OT    Discharge DME:  oxygen    Discharge Transportation: family or friend will provide    Private pay costs discussed: Not applicable    Does the patient's insurance plan have a 3 day qualifying hospital stay waiver?  Yes     Which insurance plan 3 day waiver is available? ACO REACH    Will the waiver be used for post-acute placement? No    PAS Confirmation Code:  NA  Patient/family educated on Medicare website which has current facility and service quality ratings:  NA    Education Provided on the Discharge Plan:  yes  Persons Notified of Discharge Plans: Patient, Nsg, KEIRY Deluna  Patient/Family in Agreement with the Plan: yes    Handoff Referral Completed: No, handoff not indicated or clinically appropriate    Additional Information:  Patient is discharging home with family today.  Patient will be discharged with new oxygen, nebulizer, and home care SN/PT/OT.  CC has had conversation with patient's Daughter in Law, Regi.  Patient lives with her son and KEIRY.  Regi is the primary spokeperson for patient and would like both [facility] and the home care agency to call her for scheduling.    [facility] has accepted patient.  Orders have been faxed and CC has had communication with this agency.     Family will transport home around 2:00 PM.    RN caring for patient was updated.    Callie Valdez RN  Inpatient Care Management  [phone]

## 2025-02-23 LAB
BACTERIA BLD CULT: NO GROWTH
BACTERIA BLD CULT: NO GROWTH

## 2025-02-24 ENCOUNTER — PATIENT OUTREACH (OUTPATIENT)
Dept: ONCOLOGY | Facility: CLINIC | Age: 89
End: 2025-02-24

## 2025-02-24 ENCOUNTER — PATIENT OUTREACH (OUTPATIENT)
Dept: CARE COORDINATION | Facility: CLINIC | Age: 89
End: 2025-02-24

## 2025-02-24 NOTE — PROGRESS NOTES
Clinic Care Coordination Contact  [facility]/Voicemail    Clinical Data: Care Coordinator Outreach    Outreach Documentation Number of Outreach Attempt   2/24/2025   9:37 AM 1       Left message on patient's voicemail with call back information and requested return call.      Plan: Care Coordinator will try to reach patient again in 1-2 business days.    Bonny Foley RN Clinic Care Coordinator  [facility] Clinics: [place], [place] (on-site Wednesdays), [facility] (on-site Thursdays) & [facility].  [email or web address]  Phone: [phone]

## 2025-02-25 ENCOUNTER — ANCILLARY PROCEDURE (OUTPATIENT)
Dept: CARDIOLOGY | Facility: CLINIC | Age: 89
End: 2025-02-25
Attending: INTERNAL MEDICINE
Payer: MEDICARE

## 2025-02-25 DIAGNOSIS — I49.5 SICK SINUS SYNDROME (H): ICD-10-CM

## 2025-02-25 DIAGNOSIS — Z95.0 CARDIAC PACEMAKER IN SITU: ICD-10-CM

## 2025-02-25 PROCEDURE — 93294 REM INTERROG EVL PM/LDLS PM: CPT | Performed by: INTERNAL MEDICINE

## 2025-02-25 PROCEDURE — 93296 REM INTERROG EVL PM/IDS: CPT | Performed by: INTERNAL MEDICINE

## 2025-02-25 NOTE — PROGRESS NOTES
Clinic Care Coordination Contact  Transitions of Care Outreach  Chief Complaint   Patient presents with    Clinic Care Coordination - Post Hospital       Most Recent Admission Date: 2/18/2025   Most Recent Admission Diagnosis: Influenza B - J10.1  Elevated troponin - R79.89  Acute respiratory failure with hypoxia (H) - J96.01     Most Recent Discharge Date: 2/21/2025   Most Recent Discharge Diagnosis: Acute respiratory failure with hypoxia (H) - J96.01  Elevated troponin - R79.89  Influenza B - J10.1  Community acquired bacterial pneumonia - J15.9     Transitions of Care Assessment    Discharge Assessment  How are you doing now that you are home?: Doing ok. Slow and weak.  How are your symptoms? (Red Flag symptoms escalate to triage hotline per guidelines): Improved  Do you know how to contact your clinic care team if you have future questions or changes to your health status? : Yes  Does the patient have their discharge instructions? : Yes  Does the patient have questions regarding their discharge instructions? : No  Were you started on any new medications or were there changes to any of your previous medications? : Yes  Does the patient have all of their medications?: Yes  Do you have questions regarding any of your medications? : No  Do you have all of your needed medical supplies or equipment (DME)?  (i.e. oxygen tank, CPAP, cane, etc.): Yes         Post-op (Clinicians Only)  Did the patient have surgery or a procedure: No  Fever: No  Chills: No  Eating & Drinking: eating and drinking without complaints/concerns  PO Intake: regular diet  Additional Symptoms:  (Denies)  Bowel Function: normal  Urinary Status: voiding without complaint/concerns    Care Management   None    Follow up Plan     Patient declined Care Coordination services at this time.   Patient is aware of how to initiate Care Coordination services with the clinic in the future.       Discharge Follow-Up  Discharge follow up appointment scheduled in  alignment with recommended follow up timeframe or Transitions of Risk Category? (Low = within 30 days; Moderate= within 14 days; High= within 7 days): Yes  Discharge Follow Up Appointment Date: 03/03/25  Discharge Follow Up Appointment Scheduled with?: Primary Care Provider    Future Appointments   Date Time Provider Department Center   2/26/2025  8:20 AM Alex Parry MD [facility]   3/3/2025  1:00 PM César Chung MD [facility]   3/14/2025 12:45 PM [id number]   3/19/2025  3:20 PM Imelda Benites PA-C [facility]       Outpatient Plan as outlined on AVS reviewed with patient.    For any urgent concerns, please contact our 24 hour nurse triage line: [phone] ([phone])       Bonny Foley RN

## 2025-02-26 ENCOUNTER — VIRTUAL VISIT (OUTPATIENT)
Dept: ONCOLOGY | Facility: CLINIC | Age: 89
End: 2025-02-26
Attending: INTERNAL MEDICINE
Payer: MEDICARE

## 2025-02-26 ENCOUNTER — TELEPHONE (OUTPATIENT)
Dept: CARDIOLOGY | Facility: CLINIC | Age: 89
End: 2025-02-26

## 2025-02-26 ENCOUNTER — TELEPHONE (OUTPATIENT)
Dept: FAMILY MEDICINE | Facility: CLINIC | Age: 89
End: 2025-02-26

## 2025-02-26 VITALS — HEIGHT: 61 IN | WEIGHT: 125 LBS | BODY MASS INDEX: 23.6 KG/M2

## 2025-02-26 DIAGNOSIS — K13.79 MOUTH SORES: Primary | ICD-10-CM

## 2025-02-26 DIAGNOSIS — C90.00 MULTIPLE MYELOMA NOT HAVING ACHIEVED REMISSION (H): Primary | ICD-10-CM

## 2025-02-26 LAB
MDC_IDC_EPISODE_DTM: NORMAL
MDC_IDC_EPISODE_DURATION: 1 S
MDC_IDC_EPISODE_DURATION: 120 S
MDC_IDC_EPISODE_DURATION: 1906 S
MDC_IDC_EPISODE_DURATION: 197 S
MDC_IDC_EPISODE_DURATION: 197 S
MDC_IDC_EPISODE_DURATION: 25 S
MDC_IDC_EPISODE_DURATION: 460 S
MDC_IDC_EPISODE_DURATION: 493 S
MDC_IDC_EPISODE_DURATION: 5177 S
MDC_IDC_EPISODE_DURATION: 8 S
MDC_IDC_EPISODE_DURATION: 85 S
MDC_IDC_EPISODE_DURATION: 94 S
MDC_IDC_EPISODE_DURATION: NORMAL S
MDC_IDC_EPISODE_ID: 323
MDC_IDC_EPISODE_ID: 324
MDC_IDC_EPISODE_ID: 325
MDC_IDC_EPISODE_ID: 326
MDC_IDC_EPISODE_ID: 327
MDC_IDC_EPISODE_ID: 328
MDC_IDC_EPISODE_ID: 329
MDC_IDC_EPISODE_ID: 330
MDC_IDC_EPISODE_ID: 331
MDC_IDC_EPISODE_ID: 332
MDC_IDC_EPISODE_ID: 333
MDC_IDC_EPISODE_ID: 334
MDC_IDC_EPISODE_ID: 335
MDC_IDC_EPISODE_TYPE: NORMAL
MDC_IDC_LEAD_CONNECTION_STATUS: NORMAL
MDC_IDC_LEAD_CONNECTION_STATUS: NORMAL
MDC_IDC_LEAD_IMPLANT_DT: NORMAL
MDC_IDC_LEAD_IMPLANT_DT: NORMAL
MDC_IDC_LEAD_LOCATION: NORMAL
MDC_IDC_LEAD_LOCATION: NORMAL
MDC_IDC_LEAD_LOCATION_DETAIL_1: NORMAL
MDC_IDC_LEAD_LOCATION_DETAIL_1: NORMAL
MDC_IDC_LEAD_MFG: NORMAL
MDC_IDC_LEAD_MFG: NORMAL
MDC_IDC_LEAD_MODEL: NORMAL
MDC_IDC_LEAD_MODEL: NORMAL
MDC_IDC_LEAD_POLARITY_TYPE: NORMAL
MDC_IDC_LEAD_POLARITY_TYPE: NORMAL
MDC_IDC_LEAD_SERIAL: NORMAL
MDC_IDC_LEAD_SERIAL: NORMAL
MDC_IDC_MSMT_BATTERY_DTM: NORMAL
MDC_IDC_MSMT_BATTERY_REMAINING_LONGEVITY: 5 MO
MDC_IDC_MSMT_BATTERY_RRT_TRIGGER: 2.83
MDC_IDC_MSMT_BATTERY_STATUS: NORMAL
MDC_IDC_MSMT_BATTERY_VOLTAGE: 2.85 V
MDC_IDC_MSMT_LEADCHNL_RA_IMPEDANCE_VALUE: 323 OHM
MDC_IDC_MSMT_LEADCHNL_RA_IMPEDANCE_VALUE: 342 OHM
MDC_IDC_MSMT_LEADCHNL_RA_PACING_THRESHOLD_AMPLITUDE: 0.5 V
MDC_IDC_MSMT_LEADCHNL_RA_PACING_THRESHOLD_PULSEWIDTH: 0.4 MS
MDC_IDC_MSMT_LEADCHNL_RA_SENSING_INTR_AMPL: 2 MV
MDC_IDC_MSMT_LEADCHNL_RA_SENSING_INTR_AMPL: 2 MV
MDC_IDC_MSMT_LEADCHNL_RV_IMPEDANCE_VALUE: 380 OHM
MDC_IDC_MSMT_LEADCHNL_RV_IMPEDANCE_VALUE: 456 OHM
MDC_IDC_MSMT_LEADCHNL_RV_PACING_THRESHOLD_AMPLITUDE: 1.5 V
MDC_IDC_MSMT_LEADCHNL_RV_PACING_THRESHOLD_PULSEWIDTH: 0.4 MS
MDC_IDC_MSMT_LEADCHNL_RV_SENSING_INTR_AMPL: 8.88 MV
MDC_IDC_MSMT_LEADCHNL_RV_SENSING_INTR_AMPL: 8.88 MV
MDC_IDC_PG_IMPLANT_DTM: NORMAL
MDC_IDC_PG_MFG: NORMAL
MDC_IDC_PG_MODEL: NORMAL
MDC_IDC_PG_SERIAL: NORMAL
MDC_IDC_PG_TYPE: NORMAL
MDC_IDC_SESS_CLINIC_NAME: NORMAL
MDC_IDC_SESS_DTM: NORMAL
MDC_IDC_SESS_TYPE: NORMAL
MDC_IDC_SET_BRADY_AT_MODE_SWITCH_RATE: 171 {BEATS}/MIN
MDC_IDC_SET_BRADY_HYSTRATE: NORMAL
MDC_IDC_SET_BRADY_LOWRATE: 60 {BEATS}/MIN
MDC_IDC_SET_BRADY_MAX_SENSOR_RATE: 120 {BEATS}/MIN
MDC_IDC_SET_BRADY_MAX_TRACKING_RATE: 120 {BEATS}/MIN
MDC_IDC_SET_BRADY_MODE: NORMAL
MDC_IDC_SET_BRADY_PAV_DELAY_LOW: 200 MS
MDC_IDC_SET_BRADY_SAV_DELAY_LOW: 180 MS
MDC_IDC_SET_LEADCHNL_RA_PACING_AMPLITUDE: 1.5 V
MDC_IDC_SET_LEADCHNL_RA_PACING_ANODE_ELECTRODE_1: NORMAL
MDC_IDC_SET_LEADCHNL_RA_PACING_ANODE_LOCATION_1: NORMAL
MDC_IDC_SET_LEADCHNL_RA_PACING_CAPTURE_MODE: NORMAL
MDC_IDC_SET_LEADCHNL_RA_PACING_CATHODE_ELECTRODE_1: NORMAL
MDC_IDC_SET_LEADCHNL_RA_PACING_CATHODE_LOCATION_1: NORMAL
MDC_IDC_SET_LEADCHNL_RA_PACING_POLARITY: NORMAL
MDC_IDC_SET_LEADCHNL_RA_PACING_PULSEWIDTH: 0.4 MS
MDC_IDC_SET_LEADCHNL_RA_SENSING_ANODE_ELECTRODE_1: NORMAL
MDC_IDC_SET_LEADCHNL_RA_SENSING_ANODE_LOCATION_1: NORMAL
MDC_IDC_SET_LEADCHNL_RA_SENSING_CATHODE_ELECTRODE_1: NORMAL
MDC_IDC_SET_LEADCHNL_RA_SENSING_CATHODE_LOCATION_1: NORMAL
MDC_IDC_SET_LEADCHNL_RA_SENSING_POLARITY: NORMAL
MDC_IDC_SET_LEADCHNL_RA_SENSING_SENSITIVITY: 0.3 MV
MDC_IDC_SET_LEADCHNL_RV_PACING_AMPLITUDE: 4 V
MDC_IDC_SET_LEADCHNL_RV_PACING_ANODE_ELECTRODE_1: NORMAL
MDC_IDC_SET_LEADCHNL_RV_PACING_ANODE_LOCATION_1: NORMAL
MDC_IDC_SET_LEADCHNL_RV_PACING_CAPTURE_MODE: NORMAL
MDC_IDC_SET_LEADCHNL_RV_PACING_CATHODE_ELECTRODE_1: NORMAL
MDC_IDC_SET_LEADCHNL_RV_PACING_CATHODE_LOCATION_1: NORMAL
MDC_IDC_SET_LEADCHNL_RV_PACING_POLARITY: NORMAL
MDC_IDC_SET_LEADCHNL_RV_PACING_PULSEWIDTH: 0.4 MS
MDC_IDC_SET_LEADCHNL_RV_SENSING_ANODE_ELECTRODE_1: NORMAL
MDC_IDC_SET_LEADCHNL_RV_SENSING_ANODE_LOCATION_1: NORMAL
MDC_IDC_SET_LEADCHNL_RV_SENSING_CATHODE_ELECTRODE_1: NORMAL
MDC_IDC_SET_LEADCHNL_RV_SENSING_CATHODE_LOCATION_1: NORMAL
MDC_IDC_SET_LEADCHNL_RV_SENSING_POLARITY: NORMAL
MDC_IDC_SET_LEADCHNL_RV_SENSING_SENSITIVITY: 0.9 MV
MDC_IDC_SET_ZONE_DETECTION_INTERVAL: 350 MS
MDC_IDC_SET_ZONE_DETECTION_INTERVAL: 400 MS
MDC_IDC_SET_ZONE_STATUS: NORMAL
MDC_IDC_SET_ZONE_STATUS: NORMAL
MDC_IDC_SET_ZONE_TYPE: NORMAL
MDC_IDC_SET_ZONE_VENDOR_TYPE: NORMAL
MDC_IDC_STAT_AT_BURDEN_PERCENT: 0.6 %
MDC_IDC_STAT_AT_DTM_END: NORMAL
MDC_IDC_STAT_AT_DTM_START: NORMAL
MDC_IDC_STAT_BRADY_AP_VP_PERCENT: 80.54 %
MDC_IDC_STAT_BRADY_AP_VS_PERCENT: 0.02 %
MDC_IDC_STAT_BRADY_AS_VP_PERCENT: 19.34 %
MDC_IDC_STAT_BRADY_AS_VS_PERCENT: 0.11 %
MDC_IDC_STAT_BRADY_DTM_END: NORMAL
MDC_IDC_STAT_BRADY_DTM_START: NORMAL
MDC_IDC_STAT_BRADY_RA_PERCENT_PACED: 80.11 %
MDC_IDC_STAT_BRADY_RV_PERCENT_PACED: 99.6 %
MDC_IDC_STAT_EPISODE_RECENT_COUNT: 0
MDC_IDC_STAT_EPISODE_RECENT_COUNT: 0
MDC_IDC_STAT_EPISODE_RECENT_COUNT: 1
MDC_IDC_STAT_EPISODE_RECENT_COUNT: 12
MDC_IDC_STAT_EPISODE_RECENT_COUNT_DTM_END: NORMAL
MDC_IDC_STAT_EPISODE_RECENT_COUNT_DTM_START: NORMAL
MDC_IDC_STAT_EPISODE_TOTAL_COUNT: 0
MDC_IDC_STAT_EPISODE_TOTAL_COUNT: 0
MDC_IDC_STAT_EPISODE_TOTAL_COUNT: 15
MDC_IDC_STAT_EPISODE_TOTAL_COUNT: 317
MDC_IDC_STAT_EPISODE_TOTAL_COUNT_DTM_END: NORMAL
MDC_IDC_STAT_EPISODE_TOTAL_COUNT_DTM_START: NORMAL
MDC_IDC_STAT_EPISODE_TYPE: NORMAL
MDC_IDC_STAT_TACHYTHERAPY_RECENT_DTM_END: NORMAL
MDC_IDC_STAT_TACHYTHERAPY_RECENT_DTM_START: NORMAL
MDC_IDC_STAT_TACHYTHERAPY_TOTAL_DTM_END: NORMAL
MDC_IDC_STAT_TACHYTHERAPY_TOTAL_DTM_START: NORMAL

## 2025-02-26 PROCEDURE — 1126F AMNT PAIN NOTED NONE PRSNT: CPT | Performed by: INTERNAL MEDICINE

## 2025-02-26 PROCEDURE — 98006 SYNCH AUDIO-VIDEO EST MOD 30: CPT | Performed by: INTERNAL MEDICINE

## 2025-02-26 PROCEDURE — G2211 COMPLEX E/M VISIT ADD ON: HCPCS | Performed by: INTERNAL MEDICINE

## 2025-02-26 ASSESSMENT — PAIN SCALES - GENERAL: PAINLEVEL_OUTOF10: NO PAIN (0)

## 2025-02-26 NOTE — LETTER
2/26/2025      Quiana Dunaway  [address]  [facility] [address]      Dear Colleague,    Thank you for referring your patient, Quiana Dunaway, to the [facility] [place]. Please see a copy of my visit note below.    Virtual Visit Details    Type of service:  Video Visit   Video Start Time: 8:13 AM  Video End Time: 8:25 AM    Originating Location (pt. Location): Home    Distant Location (provider location):  On-site  Platform used for Video Visit: [facility]    HEMATOLOGY HISTORY: Mrs. Dunaway is a lady with multiple myeloma (IgG kappa). High risk, t(14;16).  1. On 05/01/2017, WBC of 3.4, hemoglobin of 10.2 and platelet of 154.  2. SPEP on 07/07/2017 revealed M-spike of 1.8.  3. Bone marrow biopsy on 07/12/2017 reveals kappa monotypic plasma cell population consistent with multiple myeloma.  Bone marrow is 70% cellular.  Plasma cell is 50-60%.     -There is gain of chromosome 1, 5, 9, 15, and 17.  There is translocation 14;16.   4.  On 07/18/2017:  -M-spike of 1.9.   -Immunofixation reveals monoclonal IgG kappa.    -IgG level of 2970.  IgA of 15 and IgM of 175.    -Kappa free light chain of 67.7.  Lambda free light chain of 1.42.  Ratio of kappa to lambda of 47.71.    -Beta 2 microglobulin of 3.4.   5. PET scan on 07/24/2017 reveals several focal areas of increased FDG uptake consistent with multiple myeloma.  There is probable epithelial cyst in tail of the pancreas.  No associated abnormal FDG activity.  Left thyroid cysts or nodules without any FDG activity.  There is a 0.3 cm left upper lobe lung nodule.   6.  Velcade, Revlimid and dexamethasone between on 08/14/2017 and 04/30/2018. Velcade stopped.   -Revlimid and dexamethasone continued.  -Revlimid held between 12/27/2021 and 03/08/2022.  -Daratumumab added on 11/14/2022.  -Changed to daratumumab, pomalidomide and dexamethasone on 06/12/2023.  7. CT chest, abdomen, and pelvis on 12/30/2021 does not reveal any malignancy.  -Brain  MRI on 12/13/2022 does reveals 1.5 cm left parafalcine meningioma.     SUBJECTIVE:    Ms. Dunaway is an 89-year-old female with multiple myeloma on daratumumab, pomalidomide and dexamethasone.  She takes pomalidomide 2 mg a day for 2 weeks on and 1 week off.  Dose reduction due to side effects.  She takes dexamethasone 4 mg weekly.  She has been tolerating treatment well.       Patient has dental problem.  She has been advised dental extraction.  She did not get dental extraction done.  Because of that we are holding Zometa.    Patient was recently in the hospital between 02/18/2025 and 02/21/2025.  Patient had influenza B causing bilateral pneumonia and hypoxemic respiratory failure.    I had a video visit with the patient.  Daughter also on the video.  Patient is very weak.  She is on oxygen.    Patient says that symptoms are slowly improving.  Fatigue is little less.  She requires help with activities of daily living.  No headache.  She has chronic dizziness/lightheadedness.  No chest pain.  She is short of breath.  No abdominal pain.  Some nausea.  No vomiting.  Appetite is fairly good.  No urinary complaints.  No bowel problem.  No bleeding.  No fever.      PHYSICAL EXAMINATION:   GENERAL:  Alert and oriented x 3.  She looks weak.  She is mildly short of breath.    Rest of the system is not examined.     LABORATORY: Reviewed.      ASSESSMENT:    1.  An 89-year-old female with multiple myeloma on daratumumab, pomalidomide and dexamethasone.  Treatment on hold because of recent hospitalization.   2.  Normocytic anemia from myeloma and its treatment. Anemia has gotten worse due to recent infection.    3.  Mild thrombocytopenia.   4.  Chronic dizziness.  Stable.  5.  Generalized weakness from myeloma, anemia, medication, recent infection and old age.  Weakness has worsened because of infection and worsening anemia.   6.  Shortness of breath secondary to influenza and bilateral pneumonia.  7.  Dental problem.      PLAN:  Hold pomalidomide and daratumumab.  Okay to continue weekly dexamethasone.   Continue to hold zometa.  Labs next week.  May need blood transfusion.  See me in 4 weeks.  Continue prophylactic aspirin and acyclovir.     DISCUSSION:  1.  Patient was recently in the hospital for influenza B causing pneumonia and also respiratory failure.  Patient is recovering.  She is still very weak.  She is on oxygen.    2.  Discussed regarding multiple myeloma.  Explained to her that we will continue to hold her daratumumab and pomalidomide at this time.  Will resume it once she has recovered.  She is on weekly dexamethasone which will be continued.  Dexamethasone may help with her shortness of breath.    3.  Patient has normocytic anemia.  Generally her hemoglobin was above 10.  Now it is remaining below 8.  Worsening of anemia is due to her infection.    Will check labs including CBC next week. If anemia gets worse, she will be transfused blood.  Explained to her that anemia is adding to her fatigue and shortness of breath.    4.  Zometa is on hold.  Will continue to hold till she has dental extraction done.    5.  She will continue on prophylactic aspirin and acyclovir.    6.  She had a few questions which were all answered.  I will see her in a month.    Total visit time of 30 minutes.  Time spent in today's video, review of chart/investigations today, monitoring for toxicity of high risk medications and documentation today.          Again, thank you for allowing me to participate in the care of your patient.        Sincerely,        Alex Parry MD    Electronically signed

## 2025-02-26 NOTE — NURSING NOTE
Current patient location: [address] [facility] [address]    Is the patient currently in the state of [place]? YES    Visit mode: VIDEO    If the visit is dropped, the patient can be reconnected by:VIDEO VISIT: Text to cell phone:   Telephone Information:   Mobile [phone]       Will anyone else be joining the visit? NO  (If patient encounters technical issues they should call [phone]    Are changes needed to the allergy or medication list? No    Are refills needed on medications prescribed by this physician? NO    Rooming Documentation:  Questionnaire(s) not done per department protocol    Reason for visit: MENDOZA PAIZ

## 2025-02-26 NOTE — LETTER
2/26/2025      Quiana Dunaway  [address]  [facility] [address]      Dear Colleague,    Thank you for referring your patient, Quiana Dunaway, to the [facility] [place]. Please see a copy of my visit note below.    Virtual Visit Details    Type of service:  Video Visit   Video Start Time: 8:13 AM  Video End Time: 8:25 AM    Originating Location (pt. Location): Home    Distant Location (provider location):  On-site  Platform used for Video Visit: [facility]    HEMATOLOGY HISTORY: Mrs. Dunaway is a lady with multiple myeloma (IgG kappa). High risk, t(14;16).  1. On 05/01/2017, WBC of 3.4, hemoglobin of 10.2 and platelet of 154.  2. SPEP on 07/07/2017 revealed M-spike of 1.8.  3. Bone marrow biopsy on 07/12/2017 reveals kappa monotypic plasma cell population consistent with multiple myeloma.  Bone marrow is 70% cellular.  Plasma cell is 50-60%.     -There is gain of chromosome 1, 5, 9, 15, and 17.  There is translocation 14;16.   4.  On 07/18/2017:  -M-spike of 1.9.   -Immunofixation reveals monoclonal IgG kappa.    -IgG level of 2970.  IgA of 15 and IgM of 175.    -Kappa free light chain of 67.7.  Lambda free light chain of 1.42.  Ratio of kappa to lambda of 47.71.    -Beta 2 microglobulin of 3.4.   5. PET scan on 07/24/2017 reveals several focal areas of increased FDG uptake consistent with multiple myeloma.  There is probable epithelial cyst in tail of the pancreas.  No associated abnormal FDG activity.  Left thyroid cysts or nodules without any FDG activity.  There is a 0.3 cm left upper lobe lung nodule.   6.  Velcade, Revlimid and dexamethasone between on 08/14/2017 and 04/30/2018. Velcade stopped.   -Revlimid and dexamethasone continued.  -Revlimid held between 12/27/2021 and 03/08/2022.  -Daratumumab added on 11/14/2022.  -Changed to daratumumab, pomalidomide and dexamethasone on 06/12/2023.  7. CT chest, abdomen, and pelvis on 12/30/2021 does not reveal any malignancy.  -Brain  MRI on 12/13/2022 does reveals 1.5 cm left parafalcine meningioma.     SUBJECTIVE:    Ms. Dunaway is an 89-year-old female with multiple myeloma on daratumumab, pomalidomide and dexamethasone.  She takes pomalidomide 2 mg a day for 2 weeks on and 1 week off.  Dose reduction due to side effects.  She takes dexamethasone 4 mg weekly.  She has been tolerating treatment well.       Patient has dental problem.  She has been advised dental extraction.  She did not get dental extraction done.  Because of that we are holding Zometa.    Patient was recently in the hospital between 02/18/2025 and 02/21/2025.  Patient had influenza B causing bilateral pneumonia and hypoxemic respiratory failure.    I had a video visit with the patient.  Daughter also on the video.  Patient is very weak.  She is on oxygen.    Patient says that symptoms are slowly improving.  Fatigue is little less.  She requires help with activities of daily living.  No headache.  She has chronic dizziness/lightheadedness.  No chest pain.  She is short of breath.  No abdominal pain.  Some nausea.  No vomiting.  Appetite is fairly good.  No urinary complaints.  No bowel problem.  No bleeding.  No fever.      PHYSICAL EXAMINATION:   GENERAL:  Alert and oriented x 3.  She looks weak.  She is mildly short of breath.    Rest of the system is not examined.     LABORATORY: Reviewed.      ASSESSMENT:    1.  An 89-year-old female with multiple myeloma on daratumumab, pomalidomide and dexamethasone.  Treatment on hold because of recent hospitalization.   2.  Normocytic anemia from myeloma and its treatment. Anemia has gotten worse due to recent infection.    3.  Mild thrombocytopenia.   4.  Chronic dizziness.  Stable.  5.  Generalized weakness from myeloma, anemia, medication, recent infection and old age.  Weakness has worsened because of infection and worsening anemia.   6.  Shortness of breath secondary to influenza and bilateral pneumonia.  7.  Dental problem.      PLAN:  Hold pomalidomide and daratumumab.  Okay to continue weekly dexamethasone.   Continue to hold zometa.  Labs next week.  May need blood transfusion.  See me in 4 weeks.  Continue prophylactic aspirin and acyclovir.     DISCUSSION:  1.  Patient was recently in the hospital for influenza B causing pneumonia and also respiratory failure.  Patient is recovering.  She is still very weak.  She is on oxygen.    2.  Discussed regarding multiple myeloma.  Explained to her that we will continue to hold her daratumumab and pomalidomide at this time.  Will resume it once she has recovered.  She is on weekly dexamethasone which will be continued.  Dexamethasone may help with her shortness of breath.    3.  Patient has normocytic anemia.  Generally her hemoglobin was above 10.  Now it is remaining below 8.  Worsening of anemia is due to her infection.    Will check labs including CBC next week. If anemia gets worse, she will be transfused blood.  Explained to her that anemia is adding to her fatigue and shortness of breath.    4.  Zometa is on hold.  Will continue to hold till she has dental extraction done.    5.  She will continue on prophylactic aspirin and acyclovir.    6.  She had a few questions which were all answered.  I will see her in a month.    Total visit time of 30 minutes.  Time spent in today's video, review of chart/investigations today, monitoring for toxicity of high risk medications and documentation today.          Again, thank you for allowing me to participate in the care of your patient.        Sincerely,        lAex Parry MD    Electronically signed

## 2025-02-26 NOTE — PROGRESS NOTES
Virtual Visit Details    Type of service:  Video Visit   Video Start Time: 8:13 AM  Video End Time: 8:25 AM    Originating Location (pt. Location): Home    Distant Location (provider location):  On-site  Platform used for Video Visit: [facility]    HEMATOLOGY HISTORY: Mrs. Dunaway is a lady with multiple myeloma (IgG kappa). High risk, t(14;16).  1. On 05/01/2017, WBC of 3.4, hemoglobin of 10.2 and platelet of 154.  2. SPEP on 07/07/2017 revealed M-spike of 1.8.  3. Bone marrow biopsy on 07/12/2017 reveals kappa monotypic plasma cell population consistent with multiple myeloma.  Bone marrow is 70% cellular.  Plasma cell is 50-60%.     -There is gain of chromosome 1, 5, 9, 15, and 17.  There is translocation 14;16.   4.  On 07/18/2017:  -M-spike of 1.9.   -Immunofixation reveals monoclonal IgG kappa.    -IgG level of 2970.  IgA of 15 and IgM of 175.    -Kappa free light chain of 67.7.  Lambda free light chain of 1.42.  Ratio of kappa to lambda of 47.71.    -Beta 2 microglobulin of 3.4.   5. PET scan on 07/24/2017 reveals several focal areas of increased FDG uptake consistent with multiple myeloma.  There is probable epithelial cyst in tail of the pancreas.  No associated abnormal FDG activity.  Left thyroid cysts or nodules without any FDG activity.  There is a 0.3 cm left upper lobe lung nodule.   6.  Velcade, Revlimid and dexamethasone between on 08/14/2017 and 04/30/2018. Velcade stopped.   -Revlimid and dexamethasone continued.  -Revlimid held between 12/27/2021 and 03/08/2022.  -Daratumumab added on 11/14/2022.  -Changed to daratumumab, pomalidomide and dexamethasone on 06/12/2023.  7. CT chest, abdomen, and pelvis on 12/30/2021 does not reveal any malignancy.  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     SUBJECTIVE:    Ms. Dunaway is an 89-year-old female with multiple myeloma on daratumumab, pomalidomide and dexamethasone.  She takes pomalidomide 2 mg a day for 2 weeks on and 1  week off.  Dose reduction due to side effects.  She takes dexamethasone 4 mg weekly.  She has been tolerating treatment well.       Patient has dental problem.  She has been advised dental extraction.  She did not get dental extraction done.  Because of that we are holding Zometa.    Patient was recently in the hospital between 02/18/2025 and 02/21/2025.  Patient had influenza B causing bilateral pneumonia and hypoxemic respiratory failure.    I had a video visit with the patient.  Daughter also on the video.  Patient is very weak.  She is on oxygen.    Patient says that symptoms are slowly improving.  Fatigue is little less.  She requires help with activities of daily living.  No headache.  She has chronic dizziness/lightheadedness.  No chest pain.  She is short of breath.  No abdominal pain.  Some nausea.  No vomiting.  Appetite is fairly good.  No urinary complaints.  No bowel problem.  No bleeding.  No fever.      PHYSICAL EXAMINATION:   GENERAL:  Alert and oriented x 3.  She looks weak.  She is mildly short of breath.    Rest of the system is not examined.     LABORATORY: Reviewed.      ASSESSMENT:    1.  An 89-year-old female with multiple myeloma on daratumumab, pomalidomide and dexamethasone.  Treatment on hold because of recent hospitalization.   2.  Normocytic anemia from myeloma and its treatment. Anemia has gotten worse due to recent infection.    3.  Mild thrombocytopenia.   4.  Chronic dizziness.  Stable.  5.  Generalized weakness from myeloma, anemia, medication, recent infection and old age.  Weakness has worsened because of infection and worsening anemia.   6.  Shortness of breath secondary to influenza and bilateral pneumonia.  7.  Dental problem.     PLAN:  Hold pomalidomide and daratumumab.  Okay to continue weekly dexamethasone.   Continue to hold zometa.  Labs next week.  May need blood transfusion.  See me in 4 weeks.  Continue prophylactic aspirin and acyclovir.     DISCUSSION:  1.  Patient  was recently in the hospital for influenza B causing pneumonia and also respiratory failure.  Patient is recovering.  She is still very weak.  She is on oxygen.    2.  Discussed regarding multiple myeloma.  Explained to her that we will continue to hold her daratumumab and pomalidomide at this time.  Will resume it once she has recovered.  She is on weekly dexamethasone which will be continued.  Dexamethasone may help with her shortness of breath.    3.  Patient has normocytic anemia.  Generally her hemoglobin was above 10.  Now it is remaining below 8.  Worsening of anemia is due to her infection.    Will check labs including CBC next week. If anemia gets worse, she will be transfused blood.  Explained to her that anemia is adding to her fatigue and shortness of breath.    4.  Zometa is on hold.  Will continue to hold till she has dental extraction done.    5.  She will continue on prophylactic aspirin and acyclovir.    6.  She had a few questions which were all answered.  I will see her in a month.    Total visit time of 30 minutes.  Time spent in today's video, review of chart/investigations today, monitoring for toxicity of high risk medications and documentation today.

## 2025-02-26 NOTE — TELEPHONE ENCOUNTER
Emil RN, [facility]    Order Request:    Requesting prescription/order for magic mouthwash for mouth pain.    Rx + [facility] is pending  Please ensure Rx is appropriate

## 2025-02-26 NOTE — PATIENT INSTRUCTIONS
Hold pomalidomide and daratumumab.  Okay to continue weekly dexamethasone.   Continue to hold zometa.  Labs next week.  May need blood transfusion.  See me in 4 weeks.  Continue prophylactic aspirin and acyclovir.

## 2025-02-26 NOTE — TELEPHONE ENCOUNTER
Patient was admitted to [facility] on 2/18/25 who presents with acute cough, dyspnea, and fever, and is found to have acute hypoxemic respiratory failure and bilateral pneumonia due to acute Influenza B, as well as elevated troponin.    PMH: Multiple Myeloma (on chemotherapy), h/o high grade AV block (s/p PM), paroxysmal atrial fibrillation (not on anticoagulation).     2/18/25: Echo showed EF of 50-55%, distal septal and apical wall hypokinesia- this could reflect conduction abnormality/pacemaker activation.    2/19/25: Cardiology consulted, suspect Takotsubo cardiomyopathy but LAD/multivessel CAD also in differential; patient prefers to avoid invasive procedure; cards suggested conservative management.     Pt was started on ABX and Tamiflu at time of discharge.    Pt is scheduled for an echo on 3/14/25 at 1130, and an OV on 3/19/25 at 1510 with ROSA MARIA Imelda Benites at our [place] location.    Pt was discharged to home with [facility] services.    Writer attempted to call pt for a cardiology post discharge phone call, but no answer. VM left to call back with any non emergent cardiac related or medication questions. Reminder left of appt's as scheduled above. Writer's phone number was provided. THADDEUS Weber RN.

## 2025-02-27 NOTE — TELEPHONE ENCOUNTER
[facility]ed [facility] and spoke to FAUSTINO Stein and relayed message below with Pharmacy information.     Abby Garcia RN on 2/27/2025 at 3:14 PM

## 2025-02-27 NOTE — TELEPHONE ENCOUNTER
Writer attempted to return daughter's phone message, but now, no answer. Daughter asking to reschedule echo and cardiology OV. VM left with scheduling phone number to reschedule. THADDEUS Weber RN.

## 2025-03-03 ENCOUNTER — OFFICE VISIT (OUTPATIENT)
Dept: FAMILY MEDICINE | Facility: CLINIC | Age: 89
End: 2025-03-03
Payer: MEDICARE

## 2025-03-03 VITALS
OXYGEN SATURATION: 91 % | SYSTOLIC BLOOD PRESSURE: 116 MMHG | DIASTOLIC BLOOD PRESSURE: 71 MMHG | WEIGHT: 123 LBS | BODY MASS INDEX: 23.22 KG/M2 | RESPIRATION RATE: 16 BRPM | HEART RATE: 91 BPM | HEIGHT: 61 IN | TEMPERATURE: 98.6 F

## 2025-03-03 DIAGNOSIS — I51.81 STRESS-INDUCED CARDIOMYOPATHY: ICD-10-CM

## 2025-03-03 DIAGNOSIS — I49.5 SICK SINUS SYNDROME (H): ICD-10-CM

## 2025-03-03 DIAGNOSIS — C90.00 MULTIPLE MYELOMA NOT HAVING ACHIEVED REMISSION (H): ICD-10-CM

## 2025-03-03 DIAGNOSIS — J10.1 INFLUENZA B: Primary | ICD-10-CM

## 2025-03-03 PROBLEM — R79.89 ELEVATED TROPONIN: Status: RESOLVED | Noted: 2025-02-18 | Resolved: 2025-03-03

## 2025-03-03 PROBLEM — J96.01 ACUTE RESPIRATORY FAILURE WITH HYPOXIA (H): Status: RESOLVED | Noted: 2025-02-18 | Resolved: 2025-03-03

## 2025-03-03 PROBLEM — I50.32 CHRONIC DIASTOLIC CONGESTIVE HEART FAILURE (H): Status: RESOLVED | Noted: 2020-07-15 | Resolved: 2025-03-03

## 2025-03-03 PROBLEM — B33.8 RSV INFECTION: Status: RESOLVED | Noted: 2022-12-11 | Resolved: 2025-03-03

## 2025-03-03 LAB
ALBUMIN SERPL BCG-MCNC: 3.6 G/DL (ref 3.5–5.2)
ALP SERPL-CCNC: 77 U/L (ref 40–150)
ALT SERPL W P-5'-P-CCNC: 19 U/L (ref 0–50)
ANION GAP SERPL CALCULATED.3IONS-SCNC: 10 MMOL/L (ref 7–15)
AST SERPL W P-5'-P-CCNC: 22 U/L (ref 0–45)
BASOPHILS # BLD AUTO: 0.1 10E3/UL (ref 0–0.2)
BASOPHILS NFR BLD AUTO: 1 %
BILIRUB SERPL-MCNC: 0.3 MG/DL
BUN SERPL-MCNC: 20.4 MG/DL (ref 8–23)
CALCIUM SERPL-MCNC: 8.9 MG/DL (ref 8.8–10.4)
CHLORIDE SERPL-SCNC: 105 MMOL/L (ref 98–107)
CREAT SERPL-MCNC: 0.7 MG/DL (ref 0.51–0.95)
EGFRCR SERPLBLD CKD-EPI 2021: 83 ML/MIN/1.73M2
ELLIPTOCYTES BLD QL SMEAR: ABNORMAL
EOSINOPHIL # BLD AUTO: 0 10E3/UL (ref 0–0.7)
EOSINOPHIL NFR BLD AUTO: 1 %
ERYTHROCYTE [DISTWIDTH] IN BLOOD BY AUTOMATED COUNT: 16.9 % (ref 10–15)
FRAGMENTS BLD QL SMEAR: SLIGHT
GLUCOSE SERPL-MCNC: 98 MG/DL (ref 70–99)
HCO3 SERPL-SCNC: 26 MMOL/L (ref 22–29)
HCT VFR BLD AUTO: 29.3 % (ref 35–47)
HGB BLD-MCNC: 9.3 G/DL (ref 11.7–15.7)
IMM GRANULOCYTES # BLD: 0.1 10E3/UL
IMM GRANULOCYTES NFR BLD: 1 %
LYMPHOCYTES # BLD AUTO: 2.2 10E3/UL (ref 0.8–5.3)
LYMPHOCYTES NFR BLD AUTO: 27 %
MCH RBC QN AUTO: 28.2 PG (ref 26.5–33)
MCHC RBC AUTO-ENTMCNC: 31.7 G/DL (ref 31.5–36.5)
MCV RBC AUTO: 89 FL (ref 78–100)
MONOCYTES # BLD AUTO: 1.8 10E3/UL (ref 0–1.3)
MONOCYTES NFR BLD AUTO: 22 %
NEUTROPHILS # BLD AUTO: 4.1 10E3/UL (ref 1.6–8.3)
NEUTROPHILS NFR BLD AUTO: 50 %
NRBC # BLD AUTO: 0 10E3/UL
NRBC BLD AUTO-RTO: 0 /100
PLAT MORPH BLD: ABNORMAL
PLATELET # BLD AUTO: 236 10E3/UL (ref 150–450)
POTASSIUM SERPL-SCNC: 4 MMOL/L (ref 3.4–5.3)
PROT SERPL-MCNC: 6.3 G/DL (ref 6.4–8.3)
RBC # BLD AUTO: 3.3 10E6/UL (ref 3.8–5.2)
RBC MORPH BLD: ABNORMAL
SODIUM SERPL-SCNC: 141 MMOL/L (ref 135–145)
WBC # BLD AUTO: 8.3 10E3/UL (ref 4–11)

## 2025-03-03 PROCEDURE — 1126F AMNT PAIN NOTED NONE PRSNT: CPT | Performed by: INTERNAL MEDICINE

## 2025-03-03 PROCEDURE — 99495 TRANSJ CARE MGMT MOD F2F 14D: CPT | Performed by: INTERNAL MEDICINE

## 2025-03-03 PROCEDURE — 3078F DIAST BP <80 MM HG: CPT | Performed by: INTERNAL MEDICINE

## 2025-03-03 PROCEDURE — 3074F SYST BP LT 130 MM HG: CPT | Performed by: INTERNAL MEDICINE

## 2025-03-03 PROCEDURE — 1111F DSCHRG MED/CURRENT MED MERGE: CPT | Performed by: INTERNAL MEDICINE

## 2025-03-03 PROCEDURE — 80053 COMPREHEN METABOLIC PANEL: CPT | Performed by: INTERNAL MEDICINE

## 2025-03-03 PROCEDURE — 36415 COLL VENOUS BLD VENIPUNCTURE: CPT | Performed by: INTERNAL MEDICINE

## 2025-03-03 PROCEDURE — 85025 COMPLETE CBC W/AUTO DIFF WBC: CPT | Performed by: INTERNAL MEDICINE

## 2025-03-03 ASSESSMENT — PAIN SCALES - GENERAL: PAINLEVEL_OUTOF10: NO PAIN (0)

## 2025-03-03 NOTE — PROGRESS NOTES
Assessment & Plan     Influenza B  Improving  And try a 14-day course of PPI to help with nausea    Stress-induced cardiomyopathy  Follow-up with cardiology as directed    Sick sinus syndrome (H)  Pacemaker in place    Multiple myeloma not having achieved remission (H)  Restart chemotherapy when deemed appropriate following recovery from flu and pneumonia as per hematology oncology        MED REC REQUIRED  Post Medication Reconciliation Status: discharge medications reconciled and changed, per note/orders    FUTURE APPOINTMENTS:       - Return in about 1 month (around 4/3/2025) for recheck.      Dirk Araya is a 89 year old, presenting for the following health issues:  Hospital F/U        1/20/2025     1:16 PM   Additional Questions   Roomed by Haydee   Accompanied by Regi, daughter-in-law     History of Present Illness       Reason for visit:  Post hospital visit   She is taking medications regularly.            2/25/2025   Post Discharge Outreach   How are you doing now that you are home? Doing ok. Slow and weak.   How are your symptoms? (Red Flag symptoms escalate to triage hotline per guidelines) Improved   Does the patient have their discharge instructions?  Yes   Does the patient have questions regarding their discharge instructions?  No   Were you started on any new medications or were there changes to any of your previous medications?  Yes   Does the patient have all of their medications? Yes   Do you have questions regarding any of your medications?  No   Do you have all of your needed medical supplies or equipment (DME)?  (i.e. oxygen tank, CPAP, cane, etc.) Yes   Discharge Follow Up Appointment Date 3/3/2025   Discharge Follow Up Appointment Scheduled with? Primary Care Provider       Hospital Follow-up Visit:    Hospital/Nursing Home/IP Rehab Facility: [facility]  Date of Admission: 02/18/2025  Date of Discharge: 02/21/2025  Reason(s) for Admission:       Influenza  "B    Elevated troponin    Acute respiratory failure with hypoxia (H)    Was the patient in the ICU or did the patient experience delirium during hospitalization?  No  Do you have any other stressors you would like to discuss with your provider? No    Problems taking medications regularly:  None  Medication changes since discharge: None  Problems adhering to non-medication therapy:  None    Summary of hospitalization:  [facility] discharge summary reviewed  Diagnostic Tests/Treatments reviewed.  Follow up needed: cardiology as directed   Other Healthcare Providers Involved in Patient s Care:         None  Update since discharge: improved.         Plan of care communicated with patient and family           Hospitalized for influenza and treated for pneumonia  Feels better but continues to have some nausea without vomiting and without diarrhea  No current fevers  No dyspnea   Has some improving fatigue and malaise following her severe infection  Abnormal echocardiogram noted  Seen by cardiology and conservative management was recommended  Has cardiology followup scheduled for later this month          Objective    /71 (BP Location: Left arm, Patient Position: Sitting, Cuff Size: Adult Regular)   Pulse 91   Temp 98.6  F (37  C) (Temporal)   Resp 16   Ht 1.549 m (5' 1\")   Wt 55.8 kg (123 lb)   LMP  (LMP Unknown)   SpO2 (!) 91%   BMI 23.24 kg/m    Body mass index is 23.24 kg/m .  Physical Exam   General: Frail elderly female but no different than previous exams, not toxic, speaks in full sentences.  Cardiovascular: The heart has a regular rate and rhythm.  Pulmonary: Lungs are clear to auscultation bilaterally, breathing does not appear to be labored  Extremities: No edema  Neurological: Alert and oriented to person place and time, cranial nerves II to XII appear grossly intact  Mental state: Appropriate mood and affect, well-groomed, normal speech            Signed Electronically by: César" LISHA Chung MD

## 2025-03-03 NOTE — PATIENT INSTRUCTIONS
Try taking an acid blocker called omeprazole (Prilosec) available over the counter 20 mg once daily for 14 days to settle your stomach after the flu infection.

## 2025-03-04 NOTE — RESULT ENCOUNTER NOTE
Dear Ms. Dunaway,    Anemia is improving.    Please, call me with any questions.    Alex Parry MD

## 2025-03-05 NOTE — RESULT ENCOUNTER NOTE
The following letter pertains to your most recent diagnostic tests:    -Liver and gallbladder tests are normal for you. (ALT,AST, Alk phos, bilirubin), kidney function is normal for you (Creatinine, GFR), Sodium is normal, Potassium is normal for you, Calcium is normal for you, Glucose (blood sugar) is normal for you.      -The blood counts look improved from last check.      Sincerely,    Dr. Chung

## 2025-03-13 ENCOUNTER — TELEPHONE (OUTPATIENT)
Dept: PHARMACY | Facility: CLINIC | Age: 89
End: 2025-03-13
Payer: MEDICARE

## 2025-03-13 DIAGNOSIS — C90.00 MULTIPLE MYELOMA NOT HAVING ACHIEVED REMISSION (H): Primary | ICD-10-CM

## 2025-03-13 NOTE — TELEPHONE ENCOUNTER
Oral Chemotherapy Monitoring Program   Medication: Pomalyst  Rx: 1mg PO daily on days 1 through 14 of 28 day cycle   Auth #:  [id number]  Risk Category: adult female of non child bearing potential  Routine survey questions reviewed.   Rx to be Escribed to [facility]     Terrie Bowen [facility] Liaison  [phone]    I had to call the [employer] call center to update the current REMS auth due to dose change.

## 2025-03-25 DIAGNOSIS — R63.4 ABNORMAL LOSS OF WEIGHT: ICD-10-CM

## 2025-03-25 RX ORDER — MIRTAZAPINE 15 MG/1
15 TABLET, FILM COATED ORAL AT BEDTIME
Qty: 90 TABLET | Refills: 3 | Status: SHIPPED | OUTPATIENT
Start: 2025-03-25

## 2025-03-26 ENCOUNTER — MEDICAL CORRESPONDENCE (OUTPATIENT)
Dept: HEALTH INFORMATION MANAGEMENT | Facility: CLINIC | Age: 89
End: 2025-03-26

## 2025-03-26 DIAGNOSIS — C90.00 MULTIPLE MYELOMA NOT HAVING ACHIEVED REMISSION (H): ICD-10-CM

## 2025-03-26 RX ORDER — ACYCLOVIR 400 MG/1
400 TABLET ORAL 2 TIMES DAILY
Qty: 180 TABLET | Refills: 3 | Status: SHIPPED | OUTPATIENT
Start: 2025-03-26

## 2025-03-27 ENCOUNTER — PATIENT OUTREACH (OUTPATIENT)
Dept: CARE COORDINATION | Facility: CLINIC | Age: 89
End: 2025-03-27
Payer: MEDICARE

## 2025-04-02 ENCOUNTER — TELEPHONE (OUTPATIENT)
Dept: FAMILY MEDICINE | Facility: CLINIC | Age: 89
End: 2025-04-02
Payer: MEDICARE

## 2025-04-02 NOTE — TELEPHONE ENCOUNTER
"Faxed signed \"frequency change order\" to [facility] @ [phone] // confirmation received  "

## 2025-04-07 ENCOUNTER — TELEPHONE (OUTPATIENT)
Dept: PHARMACY | Facility: CLINIC | Age: 89
End: 2025-04-07
Payer: MEDICARE

## 2025-04-07 NOTE — TELEPHONE ENCOUNTER
Oral Chemotherapy Monitoring Program     Medication: Pomalyst  Rx: 2 mg PO daily on days 1 through 14 of 28 day cycle   Adult Female Not Of Reproductive Potential REMS Auth # [id number]  Routine survey questions reviewed  Rx to be Escribed to [facility]        Thank you,  Kim Cadet Oncology/Transplant Liaison  Phone: [phone]  Fax: [phone]

## 2025-04-08 ENCOUNTER — ONCOLOGY VISIT (OUTPATIENT)
Dept: ONCOLOGY | Facility: CLINIC | Age: 89
End: 2025-04-08
Attending: INTERNAL MEDICINE
Payer: MEDICARE

## 2025-04-08 ENCOUNTER — INFUSION THERAPY VISIT (OUTPATIENT)
Dept: INFUSION THERAPY | Facility: CLINIC | Age: 89
End: 2025-04-08
Attending: INTERNAL MEDICINE
Payer: MEDICARE

## 2025-04-08 VITALS
OXYGEN SATURATION: 95 % | SYSTOLIC BLOOD PRESSURE: 137 MMHG | DIASTOLIC BLOOD PRESSURE: 78 MMHG | BODY MASS INDEX: 24.11 KG/M2 | HEART RATE: 91 BPM | RESPIRATION RATE: 16 BRPM | WEIGHT: 127.6 LBS | TEMPERATURE: 97.9 F

## 2025-04-08 VITALS
TEMPERATURE: 97.9 F | BODY MASS INDEX: 24.11 KG/M2 | HEART RATE: 91 BPM | RESPIRATION RATE: 16 BRPM | SYSTOLIC BLOOD PRESSURE: 137 MMHG | OXYGEN SATURATION: 95 % | DIASTOLIC BLOOD PRESSURE: 78 MMHG | WEIGHT: 127.6 LBS

## 2025-04-08 DIAGNOSIS — C90.00 MULTIPLE MYELOMA NOT HAVING ACHIEVED REMISSION (H): Primary | ICD-10-CM

## 2025-04-08 LAB
ALBUMIN SERPL BCG-MCNC: 4 G/DL (ref 3.5–5.2)
ALP SERPL-CCNC: 67 U/L (ref 40–150)
ALT SERPL W P-5'-P-CCNC: 6 U/L (ref 0–50)
ANION GAP SERPL CALCULATED.3IONS-SCNC: 8 MMOL/L (ref 7–15)
AST SERPL W P-5'-P-CCNC: 12 U/L (ref 0–45)
BASOPHILS # BLD AUTO: 0.1 10E3/UL (ref 0–0.2)
BASOPHILS NFR BLD AUTO: 1 %
BILIRUB SERPL-MCNC: 0.4 MG/DL
BUN SERPL-MCNC: 24.1 MG/DL (ref 8–23)
CALCIUM SERPL-MCNC: 8.8 MG/DL (ref 8.8–10.4)
CHLORIDE SERPL-SCNC: 104 MMOL/L (ref 98–107)
CREAT SERPL-MCNC: 0.77 MG/DL (ref 0.51–0.95)
EGFRCR SERPLBLD CKD-EPI 2021: 74 ML/MIN/1.73M2
EOSINOPHIL # BLD AUTO: 0.1 10E3/UL (ref 0–0.7)
EOSINOPHIL NFR BLD AUTO: 2 %
ERYTHROCYTE [DISTWIDTH] IN BLOOD BY AUTOMATED COUNT: 17.3 % (ref 10–15)
GLUCOSE SERPL-MCNC: 115 MG/DL (ref 70–99)
HCO3 SERPL-SCNC: 29 MMOL/L (ref 22–29)
HCT VFR BLD AUTO: 31.6 % (ref 35–47)
HGB BLD-MCNC: 10.3 G/DL (ref 11.7–15.7)
IMM GRANULOCYTES # BLD: 0 10E3/UL
IMM GRANULOCYTES NFR BLD: 0 %
LYMPHOCYTES # BLD AUTO: 2.6 10E3/UL (ref 0.8–5.3)
LYMPHOCYTES NFR BLD AUTO: 48 %
MCH RBC QN AUTO: 29.9 PG (ref 26.5–33)
MCHC RBC AUTO-ENTMCNC: 32.6 G/DL (ref 31.5–36.5)
MCV RBC AUTO: 92 FL (ref 78–100)
MONOCYTES # BLD AUTO: 1.4 10E3/UL (ref 0–1.3)
MONOCYTES NFR BLD AUTO: 26 %
NEUTROPHILS # BLD AUTO: 1.2 10E3/UL (ref 1.6–8.3)
NEUTROPHILS NFR BLD AUTO: 23 %
NRBC # BLD AUTO: 0 10E3/UL
NRBC BLD AUTO-RTO: 0 /100
PLATELET # BLD AUTO: 136 10E3/UL (ref 150–450)
POTASSIUM SERPL-SCNC: 4 MMOL/L (ref 3.4–5.3)
PROT SERPL-MCNC: 7.1 G/DL (ref 6.4–8.3)
RBC # BLD AUTO: 3.44 10E6/UL (ref 3.8–5.2)
SODIUM SERPL-SCNC: 141 MMOL/L (ref 135–145)
TOTAL PROTEIN SERUM FOR ELP: 6.7 G/DL (ref 6.4–8.3)
WBC # BLD AUTO: 5.3 10E3/UL (ref 4–11)

## 2025-04-08 PROCEDURE — 82784 ASSAY IGA/IGD/IGG/IGM EACH: CPT | Performed by: INTERNAL MEDICINE

## 2025-04-08 PROCEDURE — 85041 AUTOMATED RBC COUNT: CPT | Performed by: INTERNAL MEDICINE

## 2025-04-08 PROCEDURE — 84155 ASSAY OF PROTEIN SERUM: CPT | Performed by: INTERNAL MEDICINE

## 2025-04-08 PROCEDURE — 84165 PROTEIN E-PHORESIS SERUM: CPT | Mod: 26 | Performed by: STUDENT IN AN ORGANIZED HEALTH CARE EDUCATION/TRAINING PROGRAM

## 2025-04-08 PROCEDURE — 85004 AUTOMATED DIFF WBC COUNT: CPT | Performed by: INTERNAL MEDICINE

## 2025-04-08 PROCEDURE — 96401 CHEMO ANTI-NEOPL SQ/IM: CPT

## 2025-04-08 PROCEDURE — 99214 OFFICE O/P EST MOD 30 MIN: CPT

## 2025-04-08 PROCEDURE — 83521 IG LIGHT CHAINS FREE EACH: CPT | Performed by: INTERNAL MEDICINE

## 2025-04-08 PROCEDURE — G0463 HOSPITAL OUTPT CLINIC VISIT: HCPCS | Mod: 25

## 2025-04-08 PROCEDURE — 82565 ASSAY OF CREATININE: CPT | Performed by: INTERNAL MEDICINE

## 2025-04-08 PROCEDURE — 36415 COLL VENOUS BLD VENIPUNCTURE: CPT

## 2025-04-08 PROCEDURE — 84165 PROTEIN E-PHORESIS SERUM: CPT | Mod: TC | Performed by: STUDENT IN AN ORGANIZED HEALTH CARE EDUCATION/TRAINING PROGRAM

## 2025-04-08 PROCEDURE — 250N000011 HC RX IP 250 OP 636: Mod: JZ | Performed by: INTERNAL MEDICINE

## 2025-04-08 PROCEDURE — G2211 COMPLEX E/M VISIT ADD ON: HCPCS

## 2025-04-08 RX ORDER — CALCIUM CARBONATE 500(1250)
1 TABLET ORAL DAILY
Qty: 90 TABLET | Refills: 1 | Status: SHIPPED | OUTPATIENT
Start: 2025-04-08

## 2025-04-08 RX ORDER — VITAMIN B COMPLEX
1 TABLET ORAL DAILY
Qty: 90 TABLET | Refills: 1 | Status: SHIPPED | OUTPATIENT
Start: 2025-04-08

## 2025-04-08 RX ADMIN — DARATUMUMAB AND HYALURONIDASE-FIHJ (HUMAN RECOMBINANT) 1800 MG: 1800; 30000 INJECTION SUBCUTANEOUS at 10:02

## 2025-04-08 ASSESSMENT — PAIN SCALES - GENERAL
PAINLEVEL_OUTOF10: NO PAIN (0)
PAINLEVEL_OUTOF10: NO PAIN (0)

## 2025-04-08 NOTE — PROGRESS NOTES
Infusion Nursing Note:  Quiana Dunaway presents today for C29D1 darzalex faspro.    Patient seen by provider today: Yes: ADELSO Draper.   present during visit today: Not Applicable.    Note: Patient seen and assessed in clinic by ADELSO Draper prior to infusion. Per Gisselle, ok to proceed with treatment today. Patient confirmed she is taking dexamethasone and pomalyst as ordered, denies need for refill. Zometa still on hold due to upcoming dental work.       Intravenous Access:  Lab draw site left AC, Needle type butterfly, Gauge 21.  Labs drawn without difficulty.    Treatment Conditions:  Lab Results   Component Value Date    HGB 10.3 (L) 04/08/2025    WBC 5.3 04/08/2025    ANEU 0.9 (L) 02/18/2025    ANEUTAUTO 1.2 (L) 04/08/2025     (L) 04/08/2025        Lab Results   Component Value Date     04/08/2025    POTASSIUM 4.0 04/08/2025    MAG 2.4 (H) 05/26/2020    CR 0.77 04/08/2025    HUMBERTO 8.8 04/08/2025    BILITOTAL 0.4 04/08/2025    ALBUMIN 4.0 04/08/2025    ALT 6 04/08/2025    AST 12 04/08/2025     Results reviewed, labs MET treatment parameters, ok to proceed with treatment.      Post Infusion Assessment:  Patient tolerated injection without incident.  Site patent and intact, free from redness, edema or discomfort.  No evidence of extravasations.  Access discontinued per protocol.       Discharge Plan:   Discharge instructions reviewed with: Patient.  Patient and/or family verbalized understanding of discharge instructions and all questions answered.  AVS to patient via [employer].  Patient will return 5/6/25 for next appointment.   Patient discharged in stable condition accompanied by: daughter-in-law.  Departure Mode: Ambulatory.      Amanda Howe RN

## 2025-04-08 NOTE — PROGRESS NOTES
Oncology/Hematology Visit Note  4/8/2025     Reason for Visit: Follow up- multiple myeloma (IgG kappa). High risk, t(14;16).     Oncology HPI:   1. On 05/01/2017, WBC of 3.4, hemoglobin of 10.2 and platelet of 154.  2. SPEP on 07/07/2017 revealed M-spike of 1.8.  3. Bone marrow biopsy on 07/12/2017 reveals kappa monotypic plasma cell population consistent with multiple myeloma.  Bone marrow is 70% cellular.  Plasma cell is 50-60%.     -There is gain of chromosome 1, 5, 9, 15, and 17.  There is translocation 14;16.   4.  On 07/18/2017:  -M-spike of 1.9.   -Immunofixation reveals monoclonal IgG kappa.    -IgG level of 2970.  IgA of 15 and IgM of 175.    -Kappa free light chain of 67.7.  Lambda free light chain of 1.42.  Ratio of kappa to lambda of 47.71.    -Beta 2 microglobulin of 3.4.   5. PET scan on 07/24/2017 reveals several focal areas of increased FDG uptake consistent with multiple myeloma.  There is probable epithelial cyst in tail of the pancreas.  No associated abnormal FDG activity.  Left thyroid cysts or nodules without any FDG activity.  There is a 0.3 cm left upper lobe lung nodule.   6.  Velcade, Revlimid and dexamethasone between on 08/14/2017 and 04/30/2018. Velcade stopped.   -Revlimid and dexamethasone continued.  -Revlimid held between 12/27/2021 and 03/08/2022.  -Daratumumab added on 11/14/2022.  -Changed to daratumumab, pomalidomide and dexamethasone on 06/12/2023.  7. CT chest, abdomen, and pelvis on 12/30/2021 does not reveal any malignancy.  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  8. Hospitalized between 02/18/2025 and 02/21/2025 for influenza B causing bilateral pneumonia and hypoxemic respiratory failure. Treat held   - 3/13/25 Resumed pomalidomide at 1 mg a day. Patient will take it for 2 weeks on and 2 weeks off. Resumed weekly dexamethasone 4 mg    Current Treatment:  daratumumab, pomalidomide and dexamethasone.  She takes pomalidomide 2 mg a day for 2  "weeks on and 1 week off. She takes dexamethasone 4 mg weekly.     Interval history:   Quiana present to infusion today to resume daratumumab. She is taking Pomalidomide 1 mg 2 weeks on and 2 weeks off. She reports that she remains \"tired\", but wants to continue treatment. She is looking forward to celebrating Yarsanism holiday with extended family, due [place]-town today.     She continues to  complain of dizziness, taking Meclizine as needed. PT performed BP readings, concluded that she has orthostatic hypotension. In addition, she is taking Pomalidomide in the morning.  She reports a history of vertigo.     She has nausea at times, alternating compazine and zofran.  Uncertain how long or how often using compazine. She has tried famotidine, and seems that she may be taking Prilosec at this time.     She has ongoing dental concerns, working with a dentist, but extractions have been delayed.  She also notes soreness of the left side of her tongue. There is a single tooth on that side that has been \"ground down\" for comfort.  She hasn't noticed frequent tongue movement, but daughter reports that she states \"I'm parched\" often. She is not taking any calcium or vitamin D supplementation, only B12.     REVIEW OF SYSTEMS:   14 point ROS was reviewed and is negative other than as noted above in HPI.     Current Outpatient Medications   Medication Sig Dispense Refill    acyclovir (ZOVIRAX) 400 MG tablet Take 1 tablet (400 mg) by mouth 2 times daily. Viral Prophylaxis. 180 tablet 3    aspirin 81 MG chewable tablet Take 81 mg by mouth daily       cyanocobalamin (VITAMIN B-12) 1000 MCG tablet Take 1 tablet (1,000 mcg) by mouth daily. 90 tablet 0    Daratumumab (DARZALEX IV) Inject into the vein every 28 days. Q monthly injection, given on Mondays at cancer infusion center      dexAMETHasone (DECADRON) 4 MG tablet Take 1 tablet (4 mg) by mouth once a week. 16 tablet 2    dexAMETHasone (DECADRON) 4 MG tablet Take 1 tablet (4 mg) by mouth " once a week 16 tablet 2    escitalopram (LEXAPRO) 5 MG tablet Take 1 tablet (5 mg) by mouth daily. 90 tablet 3    famotidine (PEPCID) 40 MG tablet Take 1 tablet (40 mg) by mouth 2 times daily. 180 tablet 3    furosemide (LASIX) 20 MG tablet Take once daily as needed for swelling or blood pressure greater than 170/100 90 tablet 3    guaiFENesin-dextromethorphan (ROBITUSSIN DM) 100-10 MG/5ML syrup Take 10 mLs by mouth every 4 hours as needed for cough. 236 mL 0    ipratropium - albuterol 0.5 mg/2.5 mg/3 mL (DUONEB) 0.5-2.5 (3) MG/3ML neb solution Take 1 vial (3 mLs) by nebulization every 4 hours as needed for shortness of breath, wheezing or cough. 90 mL 0    Loperamide HCl (IMODIUM A-D PO) Take 1 tablet by mouth as needed       LORazepam (ATIVAN) 0.5 MG tablet Take 1 tablet (0.5 mg) by mouth every 6 hours as needed for anxiety. 30 tablet 3    magic mouthwash suspension (diphenhydrAMINE, lidocaine, aluminum-magnesium & simethicone) Swish and swallow 10 mLs in mouth every 6 hours as needed for mouth sores. 180 mL 0    meclizine (ANTIVERT) 25 MG tablet Take 1 tablet (25 mg) by mouth 3 times daily as needed for dizziness (Patient taking differently: Take 25 mg by mouth 2 times daily. And daily as needed for dizziness) 270 tablet 2    methimazole (TAPAZOLE) 5 MG tablet Take 0.5 tablets (2.5 mg) by mouth daily. 45 tablet 3    mirtazapine (REMERON) 15 MG tablet Take 1 tablet (15 mg) by mouth at bedtime. 90 tablet 3    nitroglycerin (NITROSTAT) 0.4 MG sublingual tablet For chest pain place 1 tablet under the tongue every 5 minutes for 3 doses. If symptoms persist 5 minutes after 1st dose call 911. 25 tablet 0    ondansetron (ZOFRAN) 8 MG tablet Take 1 tablet (8 mg) by mouth every 8 hours as needed for nausea. 30 tablet 3    pomalidomide (POMALYST) 1 MG capsule Take 1 capsule (1 mg) by mouth daily for 14 days Swallow whole, do NOT break, crush, chew or open capsule. Take on days 1-14 of repeated 28 day cycles. 14 capsule 0     pomalidomide (POMALYST) 1 MG capsule Take 1 capsule (1 mg) by mouth daily for 14 days Swallow whole, do NOT break, crush, chew or open capsule. Take on days 1-14 of repeated 28 day cycles. 14 capsule 0    potassium chloride will ER (KLOR-CON M10) 10 MEQ CR tablet Take 1 tablet (10 mEq) by mouth daily 90 tablet 3    prochlorperazine (COMPAZINE) 10 MG tablet Take 1 tablet (10 mg) by mouth every 6 hours as needed (Nausea/Vomiting). 30 tablet 6          Allergies   Allergen Reactions    Levaquin [Levofloxacin] Other (See Comments)     Tingling in feet after 1 dose on 8/7/19           Exam:  Blood pressure 137/78, pulse 91, temperature 97.9  F (36.6  C), temperature source Oral, resp. rate 16, weight 57.9 kg (127 lb 9.6 oz), SpO2 95%, not currently breastfeeding.  Wt Readings from Last 4 Encounters:   04/08/25 57.9 kg (127 lb 9.6 oz)   04/08/25 57.9 kg (127 lb 9.6 oz)   03/13/25 55.8 kg (123 lb)   03/03/25 55.8 kg (123 lb)       Constitutional: Pleasant female in no acute distress.  Eyes: No scleral icterus. No conjunctival erythema or discharge  ENT: poor dentition; single canine on left; no visible ulcers or oral thrush/mucositis  Cardiovascular: Regular rate and rhythm. No murmurs, gallops, or rubs. No peripheral edema.  Respiratory: Clear to auscultation bilaterally. No wheezes or crackles.  Gastrointestinal: Bowel sounds present. Abdomen soft, non-tender. No palpable hepatosplenomegaly or masses.   MSK: moving all extremities freely  Neuro: Cranial nerves II-XII intact; rapid frequent tongue movements  Gait: ambulates with walker  Skin: No visible lesions, bruising or rashes  Psych: appropriate mood and affect       Labs:    Latest Reference Range & Units 04/08/25 09:10   Sodium 135 - 145 mmol/L 141   Potassium 3.4 - 5.3 mmol/L 4.0   Chloride 98 - 107 mmol/L 104   Carbon Dioxide (CO2) 22 - 29 mmol/L 29   Urea Nitrogen 8.0 - 23.0 mg/dL 24.1 (H)   Creatinine 0.51 - 0.95 mg/dL 0.77   GFR Estimate >60 mL/min/1.73m2 74    Calcium 8.8 - 10.4 mg/dL 8.8   Anion Gap 7 - 15 mmol/L 8   Albumin 3.5 - 5.2 g/dL 4.0   Protein Total 6.4 - 8.3 g/dL 7.1   Alkaline Phosphatase 40 - 150 U/L 67   ALT 0 - 50 U/L 6   AST 0 - 45 U/L 12   Bilirubin Total <=1.2 mg/dL 0.4   Glucose 70 - 99 mg/dL 115 (H)   WBC 4.0 - 11.0 10e3/uL 5.3   Hemoglobin 11.7 - 15.7 g/dL 10.3 (L)   Hematocrit 35.0 - 47.0 % 31.6 (L)   Platelet Count 150 - 450 10e3/uL 136 (L)   RBC Count 3.80 - 5.20 10e6/uL 3.44 (L)   MCV 78 - 100 fL 92   MCH 26.5 - 33.0 pg 29.9   MCHC 31.5 - 36.5 g/dL 32.6   RDW 10.0 - 15.0 % 17.3 (H)   % Neutrophils % 23   % Lymphocytes % 48   % Monocytes % 26   % Eosinophils % 2   % Basophils % 1   % Immature Granulocytes % 0   Absolute Basophils 0.0 - 0.2 10e3/uL 0.1   Absolute Eosinophils 0.0 - 0.7 10e3/uL 0.1   Absolute Immature Granulocytes <=0.4 10e3/uL 0.0   Absolute Lymphocytes 0.8 - 5.3 10e3/uL 2.6   Absolute Monocytes 0.0 - 1.3 10e3/uL 1.4 (H)   Absolute Neutrophils 1.6 - 8.3 10e3/uL 1.2 (L)   Absolute NRBCs 10e3/uL 0.0   NRBCs per 100 WBC <1 /100 0   Total Protein Serum for ELP 6.4 - 8.3 g/dL 6.7       Imaging: reviewed    Impression/plan: Quiana is an 89 year old female diagnosed with Multiple Myeloma.     Multiple Myeloma-(IgG kappa), High risk, t(14;16)  - 3/13/25 resumed pomalidomide at lower dose of 1 mg a day.  She will take it for 2 weeks on and 2 weeks off.  If she tolerates it, we will go back to 2 mg a day.  - 4/8/2025 patient reports that she resumed Pomalidomide 1 mg 2 weeks on and 2 weeks off, prefers to stay at this dose, as she is fatigued, but wants to continue treatment. She feels that she is strong enough to resume daratumumab today. She is taking weekly dexamethasone 4 mg.   - Labs reviewed, ok to proceed with daratumumab- MM labs pending  - continue Pomalidomide 1 mg 2 weeks on and 2 weeks on, dexamethasone 4 mg weekly  - Will see if her appt with Dr. Parry can be moved up to 5/6/25 with her next infusion    Prophylaxis  - continue  ASA and acyclovir    Neutropenia  - ANC 1.2, meets parameters  - -continue to monitor for s/s of infection. Fevers >/= 100.4 he should call the clinic, if after hours/weekend should go to ED/Urgent care.     Dizziness  - chronic, multifactorial (orthostatic hypotension, reported history of vertigo, possibly Pomalidomide)  - advised taking Meclizine as needed and limiting daily use, concerns that this is causing dehydration, lending to sore mouth  - advised slow deliberate movements with supine/sit/stand, given orthostasis  - continue adequate hydration  - advised trailing taking Pomalidomide hs to see if it improves symptoms  - consider dizzy balance clinic  - she is following with Cardiology closely, has a pacemaker    Nausea  - occasional  - advised to continue with Prilosec  - limit use of Compazine- she has rapid tongue movements that she is not conscious of, unsure if this is EPS from compazine, but advised stopping for now and using Zofran.   - will continue to monitor    Dentition  - ongoing work with Dentist, extractions on hold for now  - canine on the left is rubbing on the tongue with the frequent movements, considered that this could be causing irritation, although negative physical exam findings   - Advised salt/soda rinses for oral hygiene and following up with dentist    Bone Health  - continue to hold Zometa secondary to dental concerns- needs to be cleared by dentist  - She is not taking any calcium or vitamin D supplementation. Considered heart disease. Advised starting calcium supplementation, sent in prescription for calcium carbonate (OS-HUMBERTO) 500 MG tablet daily and Vitamin D3 25 mcg daily.     Gisselle Morrow PA-C  The longitudinal plan of care for the diagnosis(es)/condition(s) as documented were addressed during this visit. Due to the added complexity in care, I will continue to support Quiana in the subsequent management and with ongoing continuity of care.

## 2025-04-08 NOTE — LETTER
4/8/2025      Quiana Dunaway  [address]  [facility] [address]      Dear Colleague,    Thank you for referring your patient, Quiana Dunaway, to the [facility]. Please see a copy of my visit note below.    Oncology/Hematology Visit Note  4/8/2025     Reason for Visit: Follow up- multiple myeloma (IgG kappa). High risk, t(14;16).     Oncology HPI:   1. On 05/01/2017, WBC of 3.4, hemoglobin of 10.2 and platelet of 154.  2. SPEP on 07/07/2017 revealed M-spike of 1.8.  3. Bone marrow biopsy on 07/12/2017 reveals kappa monotypic plasma cell population consistent with multiple myeloma.  Bone marrow is 70% cellular.  Plasma cell is 50-60%.     -There is gain of chromosome 1, 5, 9, 15, and 17.  There is translocation 14;16.   4.  On 07/18/2017:  -M-spike of 1.9.   -Immunofixation reveals monoclonal IgG kappa.    -IgG level of 2970.  IgA of 15 and IgM of 175.    -Kappa free light chain of 67.7.  Lambda free light chain of 1.42.  Ratio of kappa to lambda of 47.71.    -Beta 2 microglobulin of 3.4.   5. PET scan on 07/24/2017 reveals several focal areas of increased FDG uptake consistent with multiple myeloma.  There is probable epithelial cyst in tail of the pancreas.  No associated abnormal FDG activity.  Left thyroid cysts or nodules without any FDG activity.  There is a 0.3 cm left upper lobe lung nodule.   6.  Velcade, Revlimid and dexamethasone between on 08/14/2017 and 04/30/2018. Velcade stopped.   -Revlimid and dexamethasone continued.  -Revlimid held between 12/27/2021 and 03/08/2022.  -Daratumumab added on 11/14/2022.  -Changed to daratumumab, pomalidomide and dexamethasone on 06/12/2023.  7. CT chest, abdomen, and pelvis on 12/30/2021 does not reveal any malignancy.  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  8. Hospitalized between 02/18/2025 and 02/21/2025 for influenza B causing bilateral pneumonia and hypoxemic respiratory failure. Treat  "held   - 3/13/25 Resumed pomalidomide at 1 mg a day. Patient will take it for 2 weeks on and 2 weeks off. Resumed weekly dexamethasone 4 mg    Current Treatment:  daratumumab, pomalidomide and dexamethasone.  She takes pomalidomide 2 mg a day for 2 weeks on and 1 week off. She takes dexamethasone 4 mg weekly.     Interval history:   Quiana present to infusion today to resume daratumumab. She is taking Pomalidomide 1 mg 2 weeks on and 2 weeks off. She reports that she remains \"tired\", but wants to continue treatment. She is looking forward to celebrating [place] holiday with extended family, due in-town today.     She continues to  complain of dizziness, taking Meclizine as needed. PT performed BP readings, concluded that she has orthostatic hypotension. In addition, she is taking Pomalidomide in the morning.  She reports a history of vertigo.     She has nausea at times, alternating compazine and zofran.  Uncertain how long or how often using compazine. She has tried famotidine, and seems that she may be taking Prilosec at this time.     She has ongoing dental concerns, working with a dentist, but extractions have been delayed.  She also notes soreness of the left side of her tongue. There is a single tooth on that side that has been \"ground down\" for comfort.  She hasn't noticed frequent tongue movement, but daughter reports that she states \"I'm parched\" often. She is not taking any calcium or vitamin D supplementation, only B12.     REVIEW OF SYSTEMS:   14 point ROS was reviewed and is negative other than as noted above in HPI.     Current Outpatient Medications   Medication Sig Dispense Refill     acyclovir (ZOVIRAX) 400 MG tablet Take 1 tablet (400 mg) by mouth 2 times daily. Viral Prophylaxis. 180 tablet 3     aspirin 81 MG chewable tablet Take 81 mg by mouth daily        cyanocobalamin (VITAMIN B-12) 1000 MCG tablet Take 1 tablet (1,000 mcg) by mouth daily. 90 tablet 0     Daratumumab (DARZALEX IV) Inject into " the vein every 28 days. Q monthly injection, given on Mondays at cancer [facility] center       dexAMETHasone (DECADRON) 4 MG tablet Take 1 tablet (4 mg) by mouth once a week. 16 tablet 2     dexAMETHasone (DECADRON) 4 MG tablet Take 1 tablet (4 mg) by mouth once a week 16 tablet 2     escitalopram (LEXAPRO) 5 MG tablet Take 1 tablet (5 mg) by mouth daily. 90 tablet 3     famotidine (PEPCID) 40 MG tablet Take 1 tablet (40 mg) by mouth 2 times daily. 180 tablet 3     furosemide (LASIX) 20 MG tablet Take once daily as needed for swelling or blood pressure greater than 170/100 90 tablet 3     guaiFENesin-dextromethorphan (ROBITUSSIN DM) 100-10 MG/5ML syrup Take 10 mLs by mouth every 4 hours as needed for cough. 236 mL 0     ipratropium - albuterol 0.5 mg/2.5 mg/3 mL (DUONEB) 0.5-2.5 (3) MG/3ML neb solution Take 1 vial (3 mLs) by nebulization every 4 hours as needed for shortness of breath, wheezing or cough. 90 mL 0     Loperamide HCl (IMODIUM A-D PO) Take 1 tablet by mouth as needed        LORazepam (ATIVAN) 0.5 MG tablet Take 1 tablet (0.5 mg) by mouth every 6 hours as needed for anxiety. 30 tablet 3     magic mouthwash suspension (diphenhydrAMINE, lidocaine, aluminum-magnesium & simethicone) Swish and swallow 10 mLs in mouth every 6 hours as needed for mouth sores. 180 mL 0     meclizine (ANTIVERT) 25 MG tablet Take 1 tablet (25 mg) by mouth 3 times daily as needed for dizziness (Patient taking differently: Take 25 mg by mouth 2 times daily. And daily as needed for dizziness) 270 tablet 2     methimazole (TAPAZOLE) 5 MG tablet Take 0.5 tablets (2.5 mg) by mouth daily. 45 tablet 3     mirtazapine (REMERON) 15 MG tablet Take 1 tablet (15 mg) by mouth at bedtime. 90 tablet 3     nitroglycerin (NITROSTAT) 0.4 MG sublingual tablet For chest pain place 1 tablet under the tongue every 5 minutes for 3 doses. If symptoms persist 5 minutes after 1st dose call 911. 25 tablet 0     ondansetron (ZOFRAN) 8 MG tablet Take 1 tablet  (8 mg) by mouth every 8 hours as needed for nausea. 30 tablet 3     pomalidomide (POMALYST) 1 MG capsule Take 1 capsule (1 mg) by mouth daily for 14 days Swallow whole, do NOT break, crush, chew or open capsule. Take on days 1-14 of repeated 28 day cycles. 14 capsule 0     pomalidomide (POMALYST) 1 MG capsule Take 1 capsule (1 mg) by mouth daily for 14 days Swallow whole, do NOT break, crush, chew or open capsule. Take on days 1-14 of repeated 28 day cycles. 14 capsule 0     potassium chloride will ER (KLOR-CON M10) 10 MEQ CR tablet Take 1 tablet (10 mEq) by mouth daily 90 tablet 3     prochlorperazine (COMPAZINE) 10 MG tablet Take 1 tablet (10 mg) by mouth every 6 hours as needed (Nausea/Vomiting). 30 tablet 6          Allergies   Allergen Reactions     Levaquin [Levofloxacin] Other (See Comments)     Tingling in feet after 1 dose on 8/7/19           Exam:  Blood pressure 137/78, pulse 91, temperature 97.9  F (36.6  C), temperature source Oral, resp. rate 16, weight 57.9 kg (127 lb 9.6 oz), SpO2 95%, not currently breastfeeding.  Wt Readings from Last 4 Encounters:   04/08/25 57.9 kg (127 lb 9.6 oz)   04/08/25 57.9 kg (127 lb 9.6 oz)   03/13/25 55.8 kg (123 lb)   03/03/25 55.8 kg (123 lb)       Constitutional: Pleasant female in no acute distress.  Eyes: No scleral icterus. No conjunctival erythema or discharge  ENT: poor dentition; single canine on left; no visible ulcers or oral thrush/mucositis  Cardiovascular: Regular rate and rhythm. No murmurs, gallops, or rubs. No peripheral edema.  Respiratory: Clear to auscultation bilaterally. No wheezes or crackles.  Gastrointestinal: Bowel sounds present. Abdomen soft, non-tender. No palpable hepatosplenomegaly or masses.   MSK: moving all extremities freely  Neuro: Cranial nerves II-XII intact; rapid frequent tongue movements  Gait: ambulates with walker  Skin: No visible lesions, bruising or rashes  Psych: appropriate mood and affect       Labs:    Latest Reference  Range & Units 04/08/25 09:10   Sodium 135 - 145 mmol/L 141   Potassium 3.4 - 5.3 mmol/L 4.0   Chloride 98 - 107 mmol/L 104   Carbon Dioxide (CO2) 22 - 29 mmol/L 29   Urea Nitrogen 8.0 - 23.0 mg/dL 24.1 (H)   Creatinine 0.51 - 0.95 mg/dL 0.77   GFR Estimate >60 mL/min/1.73m2 74   Calcium 8.8 - 10.4 mg/dL 8.8   Anion Gap 7 - 15 mmol/L 8   Albumin 3.5 - 5.2 g/dL 4.0   Protein Total 6.4 - 8.3 g/dL 7.1   Alkaline Phosphatase 40 - 150 U/L 67   ALT 0 - 50 U/L 6   AST 0 - 45 U/L 12   Bilirubin Total <=1.2 mg/dL 0.4   Glucose 70 - 99 mg/dL 115 (H)   WBC 4.0 - 11.0 10e3/uL 5.3   Hemoglobin 11.7 - 15.7 g/dL 10.3 (L)   Hematocrit 35.0 - 47.0 % 31.6 (L)   Platelet Count 150 - 450 10e3/uL 136 (L)   RBC Count 3.80 - 5.20 10e6/uL 3.44 (L)   MCV 78 - 100 fL 92   MCH 26.5 - 33.0 pg 29.9   MCHC 31.5 - 36.5 g/dL 32.6   RDW 10.0 - 15.0 % 17.3 (H)   % Neutrophils % 23   % Lymphocytes % 48   % Monocytes % 26   % Eosinophils % 2   % Basophils % 1   % Immature Granulocytes % 0   Absolute Basophils 0.0 - 0.2 10e3/uL 0.1   Absolute Eosinophils 0.0 - 0.7 10e3/uL 0.1   Absolute Immature Granulocytes <=0.4 10e3/uL 0.0   Absolute Lymphocytes 0.8 - 5.3 10e3/uL 2.6   Absolute Monocytes 0.0 - 1.3 10e3/uL 1.4 (H)   Absolute Neutrophils 1.6 - 8.3 10e3/uL 1.2 (L)   Absolute NRBCs 10e3/uL 0.0   NRBCs per 100 WBC <1 /100 0   Total Protein Serum for ELP 6.4 - 8.3 g/dL 6.7       Imaging: reviewed    Impression/plan: Quiana is an 89 year old female diagnosed with Multiple Myeloma.     Multiple Myeloma-(IgG kappa), High risk, t(14;16)  - 3/13/25 resumed pomalidomide at lower dose of 1 mg a day.  She will take it for 2 weeks on and 2 weeks off.  If she tolerates it, we will go back to 2 mg a day.  - 4/8/2025 patient reports that she resumed Pomalidomide 1 mg 2 weeks on and 2 weeks off, prefers to stay at this dose, as she is fatigued, but wants to continue treatment. She feels that she is strong enough to resume daratumumab today. She is taking weekly  dexamethasone 4 mg.   - Labs reviewed, ok to proceed with daratumumab- MM labs pending  - continue Pomalidomide 1 mg 2 weeks on and 2 weeks on, dexamethasone 4 mg weekly  - Will see if her appt with Dr. Parry can be moved up to 5/6/25 with her next infusion    Prophylaxis  - continue ASA and acyclovir    Neutropenia  - ANC 1.2, meets parameters  - -continue to monitor for s/s of infection. Fevers >/= 100.4 he should call the clinic, if after hours/weekend should go to ED/Urgent care.     Dizziness  - chronic, multifactorial (orthostatic hypotension, reported history of vertigo, possibly Pomalidomide)  - advised taking Meclizine as needed and limiting daily use, concerns that this is causing dehydration, lending to sore mouth  - advised slow deliberate movements with supine/sit/stand, given orthostasis  - continue adequate hydration  - advised trailing taking Pomalidomide hs to see if it improves symptoms  - consider dizzy balance clinic  - she is following with Cardiology closely, has a pacemaker    Nausea  - occasional  - advised to continue with Prilosec  - limit use of Compazine- she has rapid tongue movements that she is not conscious of, unsure if this is EPS from compazine, but advised stopping for now and using Zofran.   - will continue to monitor    Dentition  - ongoing work with Dentist, extractions on hold for now  - canine on the left is rubbing on the tongue with the frequent movements, considered that this could be causing irritation, although negative physical exam findings   - Advised salt/soda rinses for oral hygiene and following up with dentist    Bone Health  - continue to hold Zometa secondary to dental concerns- needs to be cleared by dentist  - She is not taking any calcium or vitamin D supplementation. Considered heart disease. Advised starting calcium supplementation, sent in prescription for calcium carbonate (OS-HUMBERTO) 500 MG tablet daily and Vitamin D3 25 mcg daily.     Gisselle Morrow PA-C  The  "longitudinal plan of care for the diagnosis(es)/condition(s) as documented were addressed during this visit. Due to the added complexity in care, I will continue to support Quiana in the subsequent management and with ongoing continuity of care.        Oncology Rooming Note    April 8, 2025 9:21 AM   Quiana Dunaway is a 89 year old female who presents for:    Chief Complaint   Patient presents with     Oncology Clinic Visit     Initial Vitals: LMP  (LMP Unknown)  Estimated body mass index is 23.24 kg/m  as calculated from the following:    Height as of 3/13/25: 1.549 m (5' 1\").    Weight as of 3/13/25: 55.8 kg (123 lb). There is no height or weight on file to calculate BSA.  Data Unavailable Comment: Data Unavailable   No LMP recorded (lmp unknown). Patient is postmenopausal.    Pharmacy name entered into [employer]:    [facility] - [place], [place] - [address]  [id number] - [place], [place] - [address] AT [facility]    Frailty Screening:   Is the patient here for a new oncology consult visit in cancer care? 2. No    PHQ9:  Did this patient require a PHQ9?: No    Pt seen in infusion by Julie Kim Shari J. Schoenberger, [facility]                Again, thank you for allowing me to participate in the care of your patient.        Sincerely,        Gisselle Morrow PA-C    Electronically signed"

## 2025-04-08 NOTE — PATIENT INSTRUCTIONS
Salt and Soda rinses:    1 tsp salt, 1 tsp baking soda in 1 c of water.    Swish and spit at least 4 times daily to prevent/minimize mouth sores.

## 2025-04-08 NOTE — PROGRESS NOTES
"Oncology Rooming Note    April 8, 2025 9:21 AM   Quiana Dunaway is a 89 year old female who presents for:    Chief Complaint   Patient presents with    Oncology Clinic Visit     Initial Vitals: LMP  (LMP Unknown)  Estimated body mass index is 23.24 kg/m  as calculated from the following:    Height as of 3/13/25: 1.549 m (5' 1\").    Weight as of 3/13/25: 55.8 kg (123 lb). There is no height or weight on file to calculate BSA.  Data Unavailable Comment: Data Unavailable   No LMP recorded (lmp unknown). Patient is postmenopausal.    Pharmacy name entered into [facility]:    [facility] - [place], [place] - [address]  [id number] - [place], [place] - [address] AT [facility]    Frailty Screening:   Is the patient here for a new oncology consult visit in cancer care? 2. No    PHQ9:  Did this patient require a PHQ9?: No    Pt seen in infusion by Julie Kim Shari J. Schoenberger, [facility]              "

## 2025-04-09 LAB
ALBUMIN SERPL ELPH-MCNC: 3.9 G/DL (ref 3.7–5.1)
ALPHA1 GLOB SERPL ELPH-MCNC: 0.3 G/DL (ref 0.2–0.4)
ALPHA2 GLOB SERPL ELPH-MCNC: 0.7 G/DL (ref 0.5–0.9)
B-GLOBULIN SERPL ELPH-MCNC: 0.6 G/DL (ref 0.6–1)
GAMMA GLOB SERPL ELPH-MCNC: 1.2 G/DL (ref 0.7–1.6)
IGG SERPL-MCNC: 1328 MG/DL (ref 610–1616)
KAPPA LC FREE SER-MCNC: 78.85 MG/DL (ref 0.33–1.94)
KAPPA LC FREE/LAMBDA FREE SER NEPH: 315.4 {RATIO} (ref 0.26–1.65)
LAMBDA LC FREE SERPL-MCNC: 0.25 MG/DL (ref 0.57–2.63)
LOCATION OF TASK: ABNORMAL
M PROTEIN SERPL ELPH-MCNC: 1.1 G/DL
PROT PATTERN SERPL ELPH-IMP: ABNORMAL

## 2025-04-10 NOTE — RESULT ENCOUNTER NOTE
Dear Ms. Dunaway,    Myeloma numbers are higher. It should improve as treatment have been resumed.    Please, call me with any questions.    Alex Parry MD

## 2025-04-15 ENCOUNTER — OFFICE VISIT (OUTPATIENT)
Dept: FAMILY MEDICINE | Facility: CLINIC | Age: 89
End: 2025-04-15
Payer: MEDICARE

## 2025-04-15 VITALS
OXYGEN SATURATION: 95 % | DIASTOLIC BLOOD PRESSURE: 68 MMHG | SYSTOLIC BLOOD PRESSURE: 138 MMHG | RESPIRATION RATE: 16 BRPM | HEART RATE: 66 BPM | TEMPERATURE: 98.5 F | BODY MASS INDEX: 24.66 KG/M2 | WEIGHT: 130.6 LBS | HEIGHT: 61 IN

## 2025-04-15 DIAGNOSIS — C90.00 MULTIPLE MYELOMA NOT HAVING ACHIEVED REMISSION (H): ICD-10-CM

## 2025-04-15 DIAGNOSIS — E05.20 TOXIC MULTINODUL GOITER: ICD-10-CM

## 2025-04-15 DIAGNOSIS — I10 BENIGN ESSENTIAL HYPERTENSION: ICD-10-CM

## 2025-04-15 DIAGNOSIS — R42 DIZZINESS: Primary | ICD-10-CM

## 2025-04-15 DIAGNOSIS — F41.1 GAD (GENERALIZED ANXIETY DISORDER): ICD-10-CM

## 2025-04-15 PROCEDURE — 91320 SARSCV2 VAC 30MCG TRS-SUC IM: CPT | Performed by: INTERNAL MEDICINE

## 2025-04-15 PROCEDURE — 90480 ADMN SARSCOV2 VAC 1/ONLY CMP: CPT | Performed by: INTERNAL MEDICINE

## 2025-04-15 PROCEDURE — 3078F DIAST BP <80 MM HG: CPT | Performed by: INTERNAL MEDICINE

## 2025-04-15 PROCEDURE — 1126F AMNT PAIN NOTED NONE PRSNT: CPT | Performed by: INTERNAL MEDICINE

## 2025-04-15 PROCEDURE — 99213 OFFICE O/P EST LOW 20 MIN: CPT | Performed by: INTERNAL MEDICINE

## 2025-04-15 PROCEDURE — 3075F SYST BP GE 130 - 139MM HG: CPT | Performed by: INTERNAL MEDICINE

## 2025-04-15 RX ORDER — MECLIZINE HYDROCHLORIDE 25 MG/1
25 TABLET ORAL EVERY 6 HOURS PRN
Qty: 360 TABLET | Refills: 3 | Status: SHIPPED | OUTPATIENT
Start: 2025-04-15

## 2025-04-15 ASSESSMENT — PAIN SCALES - GENERAL: PAINLEVEL_OUTOF10: NO PAIN (0)

## 2025-04-15 NOTE — PROGRESS NOTES
BP Readings from Last 6 Encounters:   04/15/25 138/68   04/08/25 137/78   04/08/25 137/78   03/13/25 118/74   03/03/25 116/71   02/21/25 (!) 141/69       Answers submitted by the patient for this visit:  General Questionnaire (Submitted on 4/15/2025)  Chief Complaint: Chronic problems general questions HPI Form  What is the reason for your visit today? : blood pressure  How many days per week do you miss taking your medication?: 0  Questionnaire about: Chronic problems general questions HPI Form (Submitted on 4/15/2025)  Chief Complaint: Chronic problems general questions HPI Form    Assessment & Plan     Dizziness  Chronic symptoms  Unclear etiology   MRI normal in 2022 for similar symptoms  I suspect symptoms are multifactorial   We discussed that chronic use of meclizine can perpetuate dizziness symptoms, but it is technically feasible to use the drug every 6 hours for severe symptoms  I recommend returning to ENT to make sure there is not a reversible or treatable cause for persistent quality of life limiting symptoms.  She had seen Dr. Proctor from ENT for this 2 years ago.      - meclizine (ANTIVERT) 25 MG tablet; Take 1 tablet (25 mg) by mouth every 6 hours as needed for dizziness.    Benign essential hypertension  Second check reading in clinic OK    Multiple myeloma not having achieved remission (H)  Continue follow up with oncology     ROBER (generalized anxiety disorder)  On lexapro    Toxic multinodul goiter  Check TFTs with next routine lab draw ; TSH was OK 3 months ago   - TSH with free T4 reflex; Future            FUTURE APPOINTMENTS:       - Return in about 1 month (around 5/15/2025) for recheck. Patient instructed to return to clinic or contact us sooner if symptoms worsen or new symptoms develop. .    Dirk Araya is a 89 year old, presenting for the following health issues:  Follow Up        4/15/2025     1:41 PM   Additional Questions   Roomed by Haydee   Accompanied by Regi, daughter-in-law  "    History of Present Illness       Reason for visit:  Blood pressure   She is taking medications regularly.            She continues to struggle with dizziness  She described vertigo symptoms that improve when she uses meclizine  She wonders if she could take meclizine more frequently?            Objective    /68 (BP Location: Right arm, Patient Position: Sitting, Cuff Size: Adult Regular)   Pulse 66   Temp 98.5  F (36.9  C) (Tympanic)   Resp 16   Ht 1.549 m (5' 1\")   Wt 59.2 kg (130 lb 9.6 oz)   LMP  (LMP Unknown)   SpO2 95%   BMI 24.68 kg/m    Body mass index is 24.68 kg/m .  Physical Exam   GENERAL: alert and no distress  NECK: no adenopathy, no asymmetry, masses, or scars  RESP: lungs clear to auscultation - no rales, rhonchi or wheezes  CV: Heart with regular rate and rhythm.   ABDOMEN: soft, nontender, no hepatosplenomegaly, no masses and bowel sounds normal  MS: no gross musculoskeletal defects noted, no edema  NEURO: Alert and oriented to person, place and time.  Cranial nerves 2-12 appear grossly intact.   Symmetric strength.  Gait mildly unsteady.  Uses a walker.    PSYCH: mentation appears normal, affect normal/bright, and anxious            Signed Electronically by: César Chung MD     "

## 2025-04-16 ENCOUNTER — MEDICAL CORRESPONDENCE (OUTPATIENT)
Dept: HEALTH INFORMATION MANAGEMENT | Facility: CLINIC | Age: 89
End: 2025-04-16

## 2025-04-16 ENCOUNTER — TELEPHONE (OUTPATIENT)
Dept: FAMILY MEDICINE | Facility: CLINIC | Age: 89
End: 2025-04-16
Payer: MEDICARE

## 2025-04-16 DIAGNOSIS — Z53.9 DIAGNOSIS NOT YET DEFINED: Primary | ICD-10-CM

## 2025-04-16 PROCEDURE — G0180 MD CERTIFICATION HHA PATIENT: HCPCS | Performed by: INTERNAL MEDICINE

## 2025-04-16 NOTE — TELEPHONE ENCOUNTER
Faxed jose Home Health Certification and Plan Of Care to [facility] @ [phone] // confirmation received

## 2025-04-30 DIAGNOSIS — C90.00 MULTIPLE MYELOMA NOT HAVING ACHIEVED REMISSION (H): Primary | ICD-10-CM

## 2025-05-01 ENCOUNTER — TELEPHONE (OUTPATIENT)
Dept: PHARMACY | Facility: CLINIC | Age: 89
End: 2025-05-01
Payer: MEDICARE

## 2025-05-01 NOTE — TELEPHONE ENCOUNTER
Oral Chemotherapy Monitoring Program   Medication: Pomalyst  Rx: 1mg PO daily on days 1 through 14 of 28 day cycle   Auth #:  [id number]  Risk Category: adult female of non child bearing potential  Routine survey questions reviewed.   Rx to be Escribed to [facility]     Terrie Bowen [facility] Oncology Pharmacy Liaison  [phone]

## 2025-05-02 ENCOUNTER — TELEPHONE (OUTPATIENT)
Dept: PHARMACY | Facility: CLINIC | Age: 89
End: 2025-05-02
Payer: MEDICARE

## 2025-05-02 NOTE — TELEPHONE ENCOUNTER
PA Initiation    Medication: POMALYST 1 MG PO CAPS  Insurance Company: [employer] Clinical Review - Phone [phone] Fax [phone]  Pharmacy Filling the Rx: [facility] - [place], [place] - [facility] [address]  Filling Pharmacy Phone:    Filling Pharmacy Fax:    Start Date: 5/2/2025

## 2025-05-05 NOTE — TELEPHONE ENCOUNTER
Prior Authorization Approval    Medication: POMALYST 1 MG PO CAPS  Authorization Effective Date: 2/4/2025  Authorization Expiration Date: 5/5/2026  Approved Dose/Quantity: 14/28 days  Reference #:     Insurance Company: [employer] Clinical Review - Phone [phone] Fax [phone]  Expected CoPay: $    CoPay Card Available:      Financial Assistance Needed: no, pts income above threshold for assistance  Which Pharmacy is filling the prescription: [facility] - [place], [place] - [address]  Pharmacy Notified: yes  Patient Notified: yes

## 2025-05-07 ENCOUNTER — INFUSION THERAPY VISIT (OUTPATIENT)
Dept: INFUSION THERAPY | Facility: CLINIC | Age: 89
End: 2025-05-07
Attending: INTERNAL MEDICINE
Payer: MEDICARE

## 2025-05-07 ENCOUNTER — ONCOLOGY VISIT (OUTPATIENT)
Dept: ONCOLOGY | Facility: CLINIC | Age: 89
End: 2025-05-07
Payer: MEDICARE

## 2025-05-07 VITALS
BODY MASS INDEX: 24.37 KG/M2 | HEART RATE: 89 BPM | RESPIRATION RATE: 16 BRPM | OXYGEN SATURATION: 96 % | DIASTOLIC BLOOD PRESSURE: 79 MMHG | WEIGHT: 129 LBS | SYSTOLIC BLOOD PRESSURE: 151 MMHG | TEMPERATURE: 97.6 F

## 2025-05-07 DIAGNOSIS — C90.00 MULTIPLE MYELOMA NOT HAVING ACHIEVED REMISSION (H): Primary | ICD-10-CM

## 2025-05-07 LAB
ALBUMIN SERPL BCG-MCNC: 3.9 G/DL (ref 3.5–5.2)
ALP SERPL-CCNC: 73 U/L (ref 40–150)
ALT SERPL W P-5'-P-CCNC: 7 U/L (ref 0–50)
ANION GAP SERPL CALCULATED.3IONS-SCNC: 7 MMOL/L (ref 7–15)
AST SERPL W P-5'-P-CCNC: 10 U/L (ref 0–45)
BASOPHILS # BLD AUTO: 0.1 10E3/UL (ref 0–0.2)
BASOPHILS NFR BLD AUTO: 1 %
BILIRUB SERPL-MCNC: 0.3 MG/DL
BUN SERPL-MCNC: 27.2 MG/DL (ref 8–23)
CALCIUM SERPL-MCNC: 8.9 MG/DL (ref 8.8–10.4)
CHLORIDE SERPL-SCNC: 105 MMOL/L (ref 98–107)
CREAT SERPL-MCNC: 0.71 MG/DL (ref 0.51–0.95)
EGFRCR SERPLBLD CKD-EPI 2021: 81 ML/MIN/1.73M2
EOSINOPHIL # BLD AUTO: 0.1 10E3/UL (ref 0–0.7)
EOSINOPHIL NFR BLD AUTO: 2 %
ERYTHROCYTE [DISTWIDTH] IN BLOOD BY AUTOMATED COUNT: 15.8 % (ref 10–15)
GLUCOSE SERPL-MCNC: 82 MG/DL (ref 70–99)
HCO3 SERPL-SCNC: 28 MMOL/L (ref 22–29)
HCT VFR BLD AUTO: 33.1 % (ref 35–47)
HGB BLD-MCNC: 11 G/DL (ref 11.7–15.7)
IMM GRANULOCYTES # BLD: 0 10E3/UL
IMM GRANULOCYTES NFR BLD: 0 %
LYMPHOCYTES # BLD AUTO: 2.5 10E3/UL (ref 0.8–5.3)
LYMPHOCYTES NFR BLD AUTO: 47 %
MCH RBC QN AUTO: 29.5 PG (ref 26.5–33)
MCHC RBC AUTO-ENTMCNC: 33.2 G/DL (ref 31.5–36.5)
MCV RBC AUTO: 89 FL (ref 78–100)
MONOCYTES # BLD AUTO: 1.5 10E3/UL (ref 0–1.3)
MONOCYTES NFR BLD AUTO: 29 %
NEUTROPHILS # BLD AUTO: 1.1 10E3/UL (ref 1.6–8.3)
NEUTROPHILS NFR BLD AUTO: 22 %
NRBC # BLD AUTO: 0 10E3/UL
NRBC BLD AUTO-RTO: 0 /100
PLATELET # BLD AUTO: 148 10E3/UL (ref 150–450)
POTASSIUM SERPL-SCNC: 3.8 MMOL/L (ref 3.4–5.3)
PROT SERPL-MCNC: 7 G/DL (ref 6.4–8.3)
RBC # BLD AUTO: 3.73 10E6/UL (ref 3.8–5.2)
SODIUM SERPL-SCNC: 140 MMOL/L (ref 135–145)
TOTAL PROTEIN SERUM FOR ELP: 6.7 G/DL (ref 6.4–8.3)
WBC # BLD AUTO: 5.3 10E3/UL (ref 4–11)

## 2025-05-07 PROCEDURE — 84165 PROTEIN E-PHORESIS SERUM: CPT | Mod: 26 | Performed by: STUDENT IN AN ORGANIZED HEALTH CARE EDUCATION/TRAINING PROGRAM

## 2025-05-07 PROCEDURE — G0463 HOSPITAL OUTPT CLINIC VISIT: HCPCS | Performed by: NURSE PRACTITIONER

## 2025-05-07 PROCEDURE — 84155 ASSAY OF PROTEIN SERUM: CPT | Performed by: INTERNAL MEDICINE

## 2025-05-07 PROCEDURE — 83521 IG LIGHT CHAINS FREE EACH: CPT | Performed by: INTERNAL MEDICINE

## 2025-05-07 PROCEDURE — 84165 PROTEIN E-PHORESIS SERUM: CPT | Mod: TC | Performed by: STUDENT IN AN ORGANIZED HEALTH CARE EDUCATION/TRAINING PROGRAM

## 2025-05-07 PROCEDURE — 96401 CHEMO ANTI-NEOPL SQ/IM: CPT

## 2025-05-07 PROCEDURE — 80053 COMPREHEN METABOLIC PANEL: CPT | Performed by: INTERNAL MEDICINE

## 2025-05-07 PROCEDURE — 250N000011 HC RX IP 250 OP 636: Mod: JZ | Performed by: NURSE PRACTITIONER

## 2025-05-07 PROCEDURE — 85025 COMPLETE CBC W/AUTO DIFF WBC: CPT | Performed by: INTERNAL MEDICINE

## 2025-05-07 PROCEDURE — 36415 COLL VENOUS BLD VENIPUNCTURE: CPT | Performed by: INTERNAL MEDICINE

## 2025-05-07 PROCEDURE — 82784 ASSAY IGA/IGD/IGG/IGM EACH: CPT | Performed by: INTERNAL MEDICINE

## 2025-05-07 RX ORDER — DIPHENHYDRAMINE HCL 25 MG
50 CAPSULE ORAL
Status: CANCELLED | OUTPATIENT
Start: 2025-05-07

## 2025-05-07 RX ORDER — HEPARIN SODIUM (PORCINE) LOCK FLUSH IV SOLN 100 UNIT/ML 100 UNIT/ML
5 SOLUTION INTRAVENOUS
Status: CANCELLED | OUTPATIENT
Start: 2025-05-07

## 2025-05-07 RX ORDER — DIPHENHYDRAMINE HYDROCHLORIDE 50 MG/ML
25 INJECTION, SOLUTION INTRAMUSCULAR; INTRAVENOUS
Status: CANCELLED
Start: 2025-05-07

## 2025-05-07 RX ORDER — HEPARIN SODIUM,PORCINE 10 UNIT/ML
5 VIAL (ML) INTRAVENOUS
Status: CANCELLED | OUTPATIENT
Start: 2025-05-07

## 2025-05-07 RX ORDER — LORAZEPAM 2 MG/ML
0.5 INJECTION INTRAMUSCULAR EVERY 4 HOURS PRN
Status: CANCELLED | OUTPATIENT
Start: 2025-05-07

## 2025-05-07 RX ORDER — ACETAMINOPHEN 325 MG/1
650 TABLET ORAL
Status: CANCELLED | OUTPATIENT
Start: 2025-05-07

## 2025-05-07 RX ORDER — ALBUTEROL SULFATE 0.83 MG/ML
2.5 SOLUTION RESPIRATORY (INHALATION)
Status: CANCELLED | OUTPATIENT
Start: 2025-05-07

## 2025-05-07 RX ORDER — MEPERIDINE HYDROCHLORIDE 25 MG/ML
25 INJECTION INTRAMUSCULAR; INTRAVENOUS; SUBCUTANEOUS
Status: CANCELLED | OUTPATIENT
Start: 2025-05-07

## 2025-05-07 RX ORDER — EPINEPHRINE 1 MG/ML
0.3 INJECTION, SOLUTION INTRAMUSCULAR; SUBCUTANEOUS EVERY 5 MIN PRN
Status: CANCELLED | OUTPATIENT
Start: 2025-05-07

## 2025-05-07 RX ORDER — DIPHENHYDRAMINE HYDROCHLORIDE 50 MG/ML
50 INJECTION, SOLUTION INTRAMUSCULAR; INTRAVENOUS
Status: CANCELLED
Start: 2025-05-07

## 2025-05-07 RX ORDER — METHYLPREDNISOLONE SODIUM SUCCINATE 40 MG/ML
40 INJECTION INTRAMUSCULAR; INTRAVENOUS
Status: CANCELLED
Start: 2025-05-07

## 2025-05-07 RX ORDER — DEXAMETHASONE 4 MG/1
12 TABLET ORAL
Status: CANCELLED | OUTPATIENT
Start: 2025-05-07

## 2025-05-07 RX ORDER — ALBUTEROL SULFATE 90 UG/1
1-2 INHALANT RESPIRATORY (INHALATION)
Status: CANCELLED
Start: 2025-05-07

## 2025-05-07 RX ADMIN — DARATUMUMAB AND HYALURONIDASE-FIHJ (HUMAN RECOMBINANT) 1800 MG: 1800; 30000 INJECTION SUBCUTANEOUS at 10:36

## 2025-05-07 ASSESSMENT — PAIN SCALES - GENERAL: PAINLEVEL_OUTOF10: NO PAIN (0)

## 2025-05-07 NOTE — LETTER
"5/7/2025      Quiana Dunaway  [address] [facility] [address]      Dear Colleague,    Thank you for referring your patient, Quiana Dunaway, to the [facility]. Please see a copy of my visit note below.    Oncology Rooming Note    May 7, 2025 10:02 AM   Quiana Dunaway is a 89 year old female who presents for:    Chief Complaint   Patient presents with     Oncology Clinic Visit     Initial Vitals: BP (!) 151/79   Pulse 89   Temp 97.6  F (36.4  C) (Pulmonary Artery)   Resp 16   Wt 58.5 kg (129 lb)   LMP  (LMP Unknown)   SpO2 96%   BMI 24.37 kg/m   Estimated body mass index is 24.37 kg/m  as calculated from the following:    Height as of 4/15/25: 1.549 m (5' 1\").    Weight as of this encounter: 58.5 kg (129 lb). Body surface area is 1.59 meters squared.  No Pain (0) Comment: Data Unavailable   No LMP recorded (lmp unknown). Patient is postmenopausal.  Allergies reviewed: Yes  Medications reviewed: Yes    Medications: Medication refills not needed today.  Pharmacy name entered into [employer]:    [facility] - [place], [place] - [address]  [id number] - [place], [place] - [address] AT [facility]    Frailty Screening:   Is the patient here for a new oncology consult visit in cancer care? 2. No    PHQ9:  Did this patient require a PHQ9?: No      Clinical concerns:  np was notified.      Mariela Lyn [facility]              Oncology/Hematology Visit Note  May 7, 2025    Reason for Visit: follow up of multiple myeloma IgG kappa high risk  Diagnosed 2017  Follows Dr. Parry  -08/14/2017 -04/30/2018: Velcade, Revlimid and dexamethasone Velcade stopped. Revlimid and dexamethasone continued.  12/30/2024: CT chest, abdomen, and pelvis does not reveal any malignancy.  -Revlimid held between 12/27/2021 and 03/08/2022.  -Daratumumab added on 11/14/2022.  03/13/2023- patient diagnosed with COVID her PCP prescribed " molnupiravir.  Which she completed  -We held treatment  -Changed to daratumumab, pomalidomide and dexamethasone on 06/12/2023.  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  Patient is  currently undergoing treatment with daratumumab pomalidomide and dexamethasone. Due to the side effects Pomalyst reduced to 2 mg 2 weeks on 1 week off  03/13/2025-reduced to 1 mg 2 weeks on 2 weeks off    Current treatment  Pomalyst 1 mg (14 days on, 14 days  off), daratumumab monthly, and dexamethasone 4 mg weekly        Interval History:  Overall patient reports feeling well    Review of Systems:  14 point ROS of systems including Constitutional, Eyes, Respiratory, Cardiovascular, Gastroenterology, Genitourinary, Integumentary, Muscularskeletal, Psychiatric were all negative except for pertinent positives noted in my HPI.    Physical Examination:  Physical Exam  HENT:      Head: Normocephalic.   Eyes:      Pupils: Pupils are equal, round, and reactive to light.   Cardiovascular:      Rate and Rhythm: Normal rate.   Pulmonary:      Effort: Pulmonary effort is normal.      Breath sounds: Normal breath sounds.   Abdominal:      General: Bowel sounds are normal.      Palpations: Abdomen is soft.   Musculoskeletal:         General: Normal range of motion.      Cervical back: Normal range of motion.      Right lower leg: No edema.   Neurological:      Mental Status: She is alert.         LABS  -CBC, CMP labs reviewed  Multiple myeloma labs are pending        Assessment and Plan:    multiple myeloma IgG kappa high risk  Diagnosed 2017  Patient Dr. Parry  Patient stated Dr. Parry has changed Pomalyst to 2 mg 2 weeks on 2 weeks off patient reports significant improvement in fatigue reports feeling well  Continue with Pomalyst 1 mg 2 weeks on 2 weeks off  Continue with dexamethasone 4 mg p.o. weekly  Continue with monthly daratumumab  myeloma labs are pending from today      Prophylaxis  Continue with aspirin and  acyclovir      Chronic anemia secondary to multiple myeloma and treatment  stable  Patient denies bleeding  -will continue to monitor    Chronic thrombocytopenia  Patient denies bleeding  Intermittent bleeding bruising fall injury or edema please go to ER      Bone health  Scheduled for multiple tooth extractions 7/8/2024  Continue to hold Zometa until dental work-up is done  Continue to hold Zometa    Generalized weakness  Chronic issue for the patient.  Please call with any changes or worsening of symptom      Please call clinic with any changes in health condition or question    MARIUSZ Mckeon CNP        Chart documentation with Dragon Voice recognition Software. Although reviewed after completion, some words and grammatical errors may remain.    Again, thank you for allowing me to participate in the care of your patient.        Sincerely,        MARIUSZ Mckeon CNP    Electronically signed

## 2025-05-07 NOTE — PROGRESS NOTES
"Oncology Rooming Note    May 7, 2025 10:02 AM   Quiana Dunaway is a 89 year old female who presents for:    Chief Complaint   Patient presents with    Oncology Clinic Visit     Initial Vitals: BP (!) 151/79   Pulse 89   Temp 97.6  F (36.4  C) (Pulmonary Artery)   Resp 16   Wt 58.5 kg (129 lb)   LMP  (LMP Unknown)   SpO2 96%   BMI 24.37 kg/m   Estimated body mass index is 24.37 kg/m  as calculated from the following:    Height as of 4/15/25: 1.549 m (5' 1\").    Weight as of this encounter: 58.5 kg (129 lb). Body surface area is 1.59 meters squared.  No Pain (0) Comment: Data Unavailable   No LMP recorded (lmp unknown). Patient is postmenopausal.  Allergies reviewed: Yes  Medications reviewed: Yes    Medications: Medication refills not needed today.  Pharmacy name entered into [employer]:    [facility] - [place], [place] - [address]  [id number] - [place], [place] - [address] AT [facility]    Frailty Screening:   Is the patient here for a new oncology consult visit in cancer care? 2. No    PHQ9:  Did this patient require a PHQ9?: No      Clinical concerns:  np was notified.      Mariela Lyn CMA            "

## 2025-05-07 NOTE — PROGRESS NOTES
Infusion Nursing Note:  Quiana Dunaway presents today for C30D1 Darzalex faspro.    Patient seen by provider today: Yes: Juaquin Hammond NP   present during visit today: Not Applicable.    Note: ANC 1.1- OK to proceed with tx per Juaquin Hammond NP.      Intravenous Access:  No Intravenous access/labs at this visit.    Treatment Conditions:  Lab Results   Component Value Date    HGB 11.0 (L) 05/07/2025    WBC 5.3 05/07/2025    ANEU 1.1 (L) 05/07/2025     (L) 05/07/2025        Lab Results   Component Value Date     05/07/2025    POTASSIUM 3.8 05/07/2025    MAG 2.4 (H) 05/26/2020    CR 0.71 05/07/2025    HUMBERTO 8.9 05/07/2025    BILITOTAL 0.3 05/07/2025    ALBUMIN 3.9 05/07/2025    ALT 7 05/07/2025    AST 10 05/07/2025         Post Infusion Assessment:  Patient tolerated injection without incident.  Site patent and intact, free from redness, edema or discomfort.       Discharge Plan:   Discharge instructions reviewed with: Patient and Family.  Patient and/or family verbalized understanding of discharge instructions and all questions answered.  AVS to patient via [email or web address].  Patient will return 6/4/25 for next appointment.   Patient discharged in stable condition accompanied by: self and daughter.  Departure Mode: Ambulatory with walker.      Twyla Duncan RN

## 2025-05-07 NOTE — PROGRESS NOTES
Oncology/Hematology Visit Note  May 7, 2025    Reason for Visit: follow up of multiple myeloma IgG kappa high risk  Diagnosed 2017  Follows Dr. Parry  -08/14/2017 -04/30/2018: Velcade, Revlimid and dexamethasone Velcade stopped. Revlimid and dexamethasone continued.  12/30/2024: CT chest, abdomen, and pelvis does not reveal any malignancy.  -Revlimid held between 12/27/2021 and 03/08/2022.  -Daratumumab added on 11/14/2022.  03/13/2023- patient diagnosed with COVID her PCP prescribed molnupiravir.  Which she completed  -We held treatment  -Changed to daratumumab, pomalidomide and dexamethasone on 06/12/2023.  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  Patient is  currently undergoing treatment with daratumumab pomalidomide and dexamethasone. Due to the side effects Pomalyst reduced to 2 mg 2 weeks on 1 week off  03/13/2025-reduced to 1 mg 2 weeks on 2 weeks off    Current treatment  Pomalyst 1 mg (14 days on, 14 days  off), daratumumab monthly, and dexamethasone 4 mg weekly        Interval History:  Overall patient reports feeling well    Review of Systems:  14 point ROS of systems including Constitutional, Eyes, Respiratory, Cardiovascular, Gastroenterology, Genitourinary, Integumentary, Muscularskeletal, Psychiatric were all negative except for pertinent positives noted in my HPI.    Physical Examination:  Physical Exam  HENT:      Head: Normocephalic.   Eyes:      Pupils: Pupils are equal, round, and reactive to light.   Cardiovascular:      Rate and Rhythm: Normal rate.   Pulmonary:      Effort: Pulmonary effort is normal.      Breath sounds: Normal breath sounds.   Abdominal:      General: Bowel sounds are normal.      Palpations: Abdomen is soft.   Musculoskeletal:         General: Normal range of motion.      Cervical back: Normal range of motion.      Right lower leg: No edema.   Neurological:      Mental Status: She is alert.         LABS  -CBC, CMP labs reviewed  Multiple  myeloma labs are pending        Assessment and Plan:    multiple myeloma IgG kappa high risk  Diagnosed 2017  Patient Dr. Parry  Patient stated Dr. Parry has changed Pomalyst to 2 mg 2 weeks on 2 weeks off patient reports significant improvement in fatigue reports feeling well  Continue with Pomalyst 1 mg 2 weeks on 2 weeks off  Continue with dexamethasone 4 mg p.o. weekly  Continue with monthly daratumumab  myeloma labs are pending from today      Prophylaxis  Continue with aspirin and acyclovir      Chronic anemia secondary to multiple myeloma and treatment  stable  Patient denies bleeding  -will continue to monitor    Chronic thrombocytopenia  Patient denies bleeding  Intermittent bleeding bruising fall injury or edema please go to ER      Bone health  Scheduled for multiple tooth extractions 7/8/2024  Continue to hold Zometa until dental work-up is done  Continue to hold Zometa    Generalized weakness  Chronic issue for the patient.  Please call with any changes or worsening of symptom      Please call clinic with any changes in health condition or question    MARIUSZ Mckeon CNP        Chart documentation with Dragon Voice recognition Software. Although reviewed after completion, some words and grammatical errors may remain.

## 2025-05-08 ENCOUNTER — RESULTS FOLLOW-UP (OUTPATIENT)
Dept: ONCOLOGY | Facility: CLINIC | Age: 89
End: 2025-05-08

## 2025-05-08 LAB
ALBUMIN SERPL ELPH-MCNC: 3.9 G/DL (ref 3.7–5.1)
ALPHA1 GLOB SERPL ELPH-MCNC: 0.3 G/DL (ref 0.2–0.4)
ALPHA2 GLOB SERPL ELPH-MCNC: 0.7 G/DL (ref 0.5–0.9)
B-GLOBULIN SERPL ELPH-MCNC: 0.6 G/DL (ref 0.6–1)
GAMMA GLOB SERPL ELPH-MCNC: 1.2 G/DL (ref 0.7–1.6)
IGG SERPL-MCNC: 1292 MG/DL (ref 610–1616)
KAPPA LC FREE SER-MCNC: 82.53 MG/DL (ref 0.33–1.94)
KAPPA LC FREE/LAMBDA FREE SER NEPH: 434.37 {RATIO} (ref 0.26–1.65)
LAMBDA LC FREE SERPL-MCNC: 0.19 MG/DL (ref 0.57–2.63)
M PROTEIN SERPL ELPH-MCNC: 1 G/DL
PROT PATTERN SERPL ELPH-IMP: ABNORMAL

## 2025-05-09 NOTE — RESULT ENCOUNTER NOTE
Dear Ms. Dunaway,    Botkins free light chain has mildly increased. Other blood tests are stable. We will review it during appointment.    Please, call me with any questions.    Alex Parry MD

## 2025-05-19 ENCOUNTER — OFFICE VISIT (OUTPATIENT)
Dept: FAMILY MEDICINE | Facility: CLINIC | Age: 89
End: 2025-05-19
Payer: MEDICARE

## 2025-05-19 VITALS
OXYGEN SATURATION: 95 % | DIASTOLIC BLOOD PRESSURE: 70 MMHG | TEMPERATURE: 98.7 F | BODY MASS INDEX: 18.63 KG/M2 | RESPIRATION RATE: 16 BRPM | WEIGHT: 98.7 LBS | SYSTOLIC BLOOD PRESSURE: 122 MMHG | HEIGHT: 61 IN | HEART RATE: 79 BPM

## 2025-05-19 DIAGNOSIS — E05.20 TOXIC MULTINODUL GOITER: ICD-10-CM

## 2025-05-19 DIAGNOSIS — F41.1 GAD (GENERALIZED ANXIETY DISORDER): ICD-10-CM

## 2025-05-19 DIAGNOSIS — C90.00 MULTIPLE MYELOMA NOT HAVING ACHIEVED REMISSION (H): Primary | ICD-10-CM

## 2025-05-19 DIAGNOSIS — I10 BENIGN ESSENTIAL HYPERTENSION: ICD-10-CM

## 2025-05-19 PROCEDURE — 1126F AMNT PAIN NOTED NONE PRSNT: CPT | Performed by: INTERNAL MEDICINE

## 2025-05-19 PROCEDURE — 99213 OFFICE O/P EST LOW 20 MIN: CPT | Performed by: INTERNAL MEDICINE

## 2025-05-19 PROCEDURE — 3078F DIAST BP <80 MM HG: CPT | Performed by: INTERNAL MEDICINE

## 2025-05-19 PROCEDURE — 3074F SYST BP LT 130 MM HG: CPT | Performed by: INTERNAL MEDICINE

## 2025-05-19 ASSESSMENT — PAIN SCALES - GENERAL: PAINLEVEL_OUTOF10: NO PAIN (0)

## 2025-05-19 NOTE — PROGRESS NOTES
Assessment & Plan     Multiple myeloma not having achieved remission (H)  Continue follow up with hematology oncology as directed     Benign essential hypertension  Well controlled continue current drugs     Toxic multinodul goiter  Hopefully TSH can added to next routine oncology lab draw    ROBER (generalized anxiety disorder)  Stable on lexapro; she appreciates short term follow up for this too    Disabled parking permit completed today too             Follow-up  Return in about 6 weeks (around 6/30/2025).  Patient instructed to return to clinic or contact us sooner if symptoms worsen or new symptoms develop.     Dirk Araya is a 89 year old, presenting for the following health issues:  Follow Up (Blood pressure)        5/19/2025     2:17 PM   Additional Questions   Roomed by Haydee   Accompanied by Regi, daughter-in-law     History of Present Illness       Hypertension: She presents for follow up of hypertension.  She does not check blood pressure  regularly outside of the clinic. Outpatient blood pressures have not been over 140/90. She follows a low salt diet.     She eats 0-1 servings of fruits and vegetables daily.She consumes 0 sweetened beverage(s) daily.She exercises with enough effort to increase her heart rate 9 or less minutes per day.  She exercises with enough effort to increase her heart rate 3 or less days per week.   She is taking medications regularly.            89-year-old female here for routine follow-up of her myeloma, hypertension, toxic multinodular goiter, generalized anxiety  She appreciates short-term follow-up for reassurance  Overall, she feels much better this visit than last visit since changing the administration time of her Pomalyst from the mornings to the evenings  This has improved her nausea greatly  She has no other new complaints          Objective    /70 (BP Location: Left arm, Patient Position: Sitting, Cuff Size: Adult Regular)   Pulse 79   Temp 98.7  F  "(37.1  C) (Tympanic)   Resp 16   Ht 1.549 m (5' 1\")   Wt 44.8 kg (98 lb 11.2 oz)   LMP  (LMP Unknown)   SpO2 95%   BMI 18.65 kg/m    Body mass index is 18.65 kg/m .  Physical Exam   General: This is a improved appearing, well-appearing elderly female in no acute distress.  HEENT: She is now a dentulous.  Cardiovascular: The heart has a regular rate and rhythm.  Pulmonary: Lungs are clear to auscultation bilaterally, breathing is not labored.  Extremities: No new edema.  Neurological: Alert and oriented to person place and time, cranial nerves II to XII appear grossly intact, she uses a cane            Signed Electronically by: César Chung MD      Total time including face to face time documentation and order enter 20 minutes   "

## 2025-05-28 DIAGNOSIS — C90.00 MULTIPLE MYELOMA NOT HAVING ACHIEVED REMISSION (H): Primary | ICD-10-CM

## 2025-05-28 DIAGNOSIS — I50.32 CHRONIC DIASTOLIC CONGESTIVE HEART FAILURE (H): ICD-10-CM

## 2025-05-28 RX ORDER — POTASSIUM CHLORIDE 750 MG/1
10 TABLET, EXTENDED RELEASE ORAL DAILY
Qty: 90 TABLET | Refills: 0 | Status: SHIPPED | OUTPATIENT
Start: 2025-05-28

## 2025-06-04 ENCOUNTER — ONCOLOGY VISIT (OUTPATIENT)
Dept: ONCOLOGY | Facility: CLINIC | Age: 89
End: 2025-06-04
Attending: INTERNAL MEDICINE
Payer: MEDICARE

## 2025-06-04 ENCOUNTER — LAB (OUTPATIENT)
Dept: INFUSION THERAPY | Facility: CLINIC | Age: 89
End: 2025-06-04
Attending: INTERNAL MEDICINE
Payer: MEDICARE

## 2025-06-04 VITALS
SYSTOLIC BLOOD PRESSURE: 159 MMHG | BODY MASS INDEX: 25.7 KG/M2 | RESPIRATION RATE: 18 BRPM | TEMPERATURE: 97.7 F | HEART RATE: 87 BPM | WEIGHT: 136 LBS | DIASTOLIC BLOOD PRESSURE: 77 MMHG | OXYGEN SATURATION: 95 %

## 2025-06-04 DIAGNOSIS — C90.00 MULTIPLE MYELOMA NOT HAVING ACHIEVED REMISSION (H): Primary | ICD-10-CM

## 2025-06-04 LAB
ACANTHOCYTES BLD QL SMEAR: SLIGHT
ALBUMIN SERPL BCG-MCNC: 3.8 G/DL (ref 3.5–5.2)
ALP SERPL-CCNC: 65 U/L (ref 40–150)
ALT SERPL W P-5'-P-CCNC: 7 U/L (ref 0–50)
ANION GAP SERPL CALCULATED.3IONS-SCNC: 8 MMOL/L (ref 7–15)
AST SERPL W P-5'-P-CCNC: 8 U/L (ref 0–45)
BASOPHILS # BLD AUTO: 0.1 10E3/UL (ref 0–0.2)
BASOPHILS NFR BLD AUTO: 1 %
BILIRUB SERPL-MCNC: 0.4 MG/DL
BUN SERPL-MCNC: 18.5 MG/DL (ref 8–23)
CALCIUM SERPL-MCNC: 8.3 MG/DL (ref 8.8–10.4)
CHLORIDE SERPL-SCNC: 110 MMOL/L (ref 98–107)
CREAT SERPL-MCNC: 0.71 MG/DL (ref 0.51–0.95)
EGFRCR SERPLBLD CKD-EPI 2021: 81 ML/MIN/1.73M2
ELLIPTOCYTES BLD QL SMEAR: ABNORMAL
EOSINOPHIL # BLD AUTO: 0.1 10E3/UL (ref 0–0.7)
EOSINOPHIL NFR BLD AUTO: 2 %
ERYTHROCYTE [DISTWIDTH] IN BLOOD BY AUTOMATED COUNT: 15.9 % (ref 10–15)
FRAGMENTS BLD QL SMEAR: ABNORMAL
GLUCOSE SERPL-MCNC: 83 MG/DL (ref 70–99)
HCO3 SERPL-SCNC: 25 MMOL/L (ref 22–29)
HCT VFR BLD AUTO: 32.7 % (ref 35–47)
HGB BLD-MCNC: 10.6 G/DL (ref 11.7–15.7)
IMM GRANULOCYTES # BLD: 0 10E3/UL
IMM GRANULOCYTES NFR BLD: 1 %
LYMPHOCYTES # BLD AUTO: 2.7 10E3/UL (ref 0.8–5.3)
LYMPHOCYTES NFR BLD AUTO: 45 %
MCH RBC QN AUTO: 29.3 PG (ref 26.5–33)
MCHC RBC AUTO-ENTMCNC: 32.4 G/DL (ref 31.5–36.5)
MCV RBC AUTO: 90 FL (ref 78–100)
MONOCYTES # BLD AUTO: 1.9 10E3/UL (ref 0–1.3)
MONOCYTES NFR BLD AUTO: 33 %
NEUTROPHILS # BLD AUTO: 1.1 10E3/UL (ref 1.6–8.3)
NEUTROPHILS NFR BLD AUTO: 19 %
NRBC # BLD AUTO: 0 10E3/UL
NRBC BLD AUTO-RTO: 0 /100
PLAT MORPH BLD: ABNORMAL
PLATELET # BLD AUTO: 119 10E3/UL (ref 150–450)
POTASSIUM SERPL-SCNC: 4.1 MMOL/L (ref 3.4–5.3)
PROT SERPL-MCNC: 6.6 G/DL (ref 6.4–8.3)
RBC # BLD AUTO: 3.62 10E6/UL (ref 3.8–5.2)
RBC MORPH BLD: ABNORMAL
SODIUM SERPL-SCNC: 143 MMOL/L (ref 135–145)
TOTAL PROTEIN SERUM FOR ELP: 6.3 G/DL (ref 6.4–8.3)
WBC # BLD AUTO: 5.9 10E3/UL (ref 4–11)

## 2025-06-04 PROCEDURE — 84155 ASSAY OF PROTEIN SERUM: CPT | Performed by: INTERNAL MEDICINE

## 2025-06-04 PROCEDURE — 36415 COLL VENOUS BLD VENIPUNCTURE: CPT | Performed by: INTERNAL MEDICINE

## 2025-06-04 PROCEDURE — 99215 OFFICE O/P EST HI 40 MIN: CPT | Performed by: INTERNAL MEDICINE

## 2025-06-04 PROCEDURE — 83521 IG LIGHT CHAINS FREE EACH: CPT | Performed by: INTERNAL MEDICINE

## 2025-06-04 PROCEDURE — 82784 ASSAY IGA/IGD/IGG/IGM EACH: CPT | Performed by: INTERNAL MEDICINE

## 2025-06-04 PROCEDURE — 84165 PROTEIN E-PHORESIS SERUM: CPT | Mod: 26 | Performed by: PATHOLOGY

## 2025-06-04 PROCEDURE — 250N000011 HC RX IP 250 OP 636: Mod: JZ | Performed by: INTERNAL MEDICINE

## 2025-06-04 PROCEDURE — G0463 HOSPITAL OUTPT CLINIC VISIT: HCPCS | Performed by: INTERNAL MEDICINE

## 2025-06-04 PROCEDURE — 85025 COMPLETE CBC W/AUTO DIFF WBC: CPT | Performed by: INTERNAL MEDICINE

## 2025-06-04 PROCEDURE — G2211 COMPLEX E/M VISIT ADD ON: HCPCS | Performed by: INTERNAL MEDICINE

## 2025-06-04 PROCEDURE — 84165 PROTEIN E-PHORESIS SERUM: CPT | Mod: TC | Performed by: PATHOLOGY

## 2025-06-04 RX ORDER — DIPHENHYDRAMINE HYDROCHLORIDE 50 MG/ML
50 INJECTION, SOLUTION INTRAMUSCULAR; INTRAVENOUS
Status: CANCELLED
Start: 2025-06-04

## 2025-06-04 RX ORDER — DEXAMETHASONE 4 MG/1
12 TABLET ORAL
Status: CANCELLED | OUTPATIENT
Start: 2025-06-04

## 2025-06-04 RX ORDER — LORAZEPAM 2 MG/ML
0.5 INJECTION INTRAMUSCULAR EVERY 4 HOURS PRN
OUTPATIENT
Start: 2025-07-01

## 2025-06-04 RX ORDER — ALBUTEROL SULFATE 0.83 MG/ML
2.5 SOLUTION RESPIRATORY (INHALATION)
OUTPATIENT
Start: 2025-07-01

## 2025-06-04 RX ORDER — LORAZEPAM 2 MG/ML
0.5 INJECTION INTRAMUSCULAR EVERY 4 HOURS PRN
Status: CANCELLED | OUTPATIENT
Start: 2025-06-04

## 2025-06-04 RX ORDER — DEXAMETHASONE 4 MG/1
12 TABLET ORAL
OUTPATIENT
Start: 2025-07-01

## 2025-06-04 RX ORDER — HEPARIN SODIUM (PORCINE) LOCK FLUSH IV SOLN 100 UNIT/ML 100 UNIT/ML
5 SOLUTION INTRAVENOUS
OUTPATIENT
Start: 2025-07-01

## 2025-06-04 RX ORDER — MEPERIDINE HYDROCHLORIDE 25 MG/ML
25 INJECTION INTRAMUSCULAR; INTRAVENOUS; SUBCUTANEOUS
OUTPATIENT
Start: 2025-07-01

## 2025-06-04 RX ORDER — METHYLPREDNISOLONE SODIUM SUCCINATE 40 MG/ML
40 INJECTION INTRAMUSCULAR; INTRAVENOUS
Start: 2025-07-01

## 2025-06-04 RX ORDER — DIPHENHYDRAMINE HCL 25 MG
50 CAPSULE ORAL
OUTPATIENT
Start: 2025-07-01

## 2025-06-04 RX ORDER — DIPHENHYDRAMINE HYDROCHLORIDE 50 MG/ML
25 INJECTION, SOLUTION INTRAMUSCULAR; INTRAVENOUS
Start: 2025-07-01

## 2025-06-04 RX ORDER — ALBUTEROL SULFATE 90 UG/1
1-2 INHALANT RESPIRATORY (INHALATION)
Status: CANCELLED
Start: 2025-06-04

## 2025-06-04 RX ORDER — DIPHENHYDRAMINE HCL 25 MG
50 CAPSULE ORAL
Status: CANCELLED | OUTPATIENT
Start: 2025-06-04

## 2025-06-04 RX ORDER — HEPARIN SODIUM,PORCINE 10 UNIT/ML
5 VIAL (ML) INTRAVENOUS
OUTPATIENT
Start: 2025-07-01

## 2025-06-04 RX ORDER — ACETAMINOPHEN 325 MG/1
650 TABLET ORAL
Status: CANCELLED | OUTPATIENT
Start: 2025-06-04

## 2025-06-04 RX ORDER — MEPERIDINE HYDROCHLORIDE 25 MG/ML
25 INJECTION INTRAMUSCULAR; INTRAVENOUS; SUBCUTANEOUS
Status: CANCELLED | OUTPATIENT
Start: 2025-06-04

## 2025-06-04 RX ORDER — ACETAMINOPHEN 325 MG/1
650 TABLET ORAL
OUTPATIENT
Start: 2025-07-01

## 2025-06-04 RX ORDER — EPINEPHRINE 1 MG/ML
0.3 INJECTION, SOLUTION INTRAMUSCULAR; SUBCUTANEOUS EVERY 5 MIN PRN
Status: CANCELLED | OUTPATIENT
Start: 2025-06-04

## 2025-06-04 RX ORDER — HEPARIN SODIUM (PORCINE) LOCK FLUSH IV SOLN 100 UNIT/ML 100 UNIT/ML
5 SOLUTION INTRAVENOUS
Status: CANCELLED | OUTPATIENT
Start: 2025-06-04

## 2025-06-04 RX ORDER — ALBUTEROL SULFATE 0.83 MG/ML
2.5 SOLUTION RESPIRATORY (INHALATION)
Status: CANCELLED | OUTPATIENT
Start: 2025-06-04

## 2025-06-04 RX ORDER — EPINEPHRINE 1 MG/ML
0.3 INJECTION, SOLUTION INTRAMUSCULAR; SUBCUTANEOUS EVERY 5 MIN PRN
OUTPATIENT
Start: 2025-07-01

## 2025-06-04 RX ORDER — METHYLPREDNISOLONE SODIUM SUCCINATE 40 MG/ML
40 INJECTION INTRAMUSCULAR; INTRAVENOUS
Status: CANCELLED
Start: 2025-06-04

## 2025-06-04 RX ORDER — DIPHENHYDRAMINE HYDROCHLORIDE 50 MG/ML
25 INJECTION, SOLUTION INTRAMUSCULAR; INTRAVENOUS
Status: CANCELLED
Start: 2025-06-04

## 2025-06-04 RX ORDER — HEPARIN SODIUM,PORCINE 10 UNIT/ML
5 VIAL (ML) INTRAVENOUS
Status: CANCELLED | OUTPATIENT
Start: 2025-06-04

## 2025-06-04 RX ORDER — DIPHENHYDRAMINE HYDROCHLORIDE 50 MG/ML
50 INJECTION, SOLUTION INTRAMUSCULAR; INTRAVENOUS
Start: 2025-07-01

## 2025-06-04 RX ORDER — ALBUTEROL SULFATE 90 UG/1
1-2 INHALANT RESPIRATORY (INHALATION)
Start: 2025-07-01

## 2025-06-04 RX ADMIN — DARATUMUMAB AND HYALURONIDASE-FIHJ (HUMAN RECOMBINANT) 1800 MG: 1800; 30000 INJECTION SUBCUTANEOUS at 10:25

## 2025-06-04 ASSESSMENT — PAIN SCALES - GENERAL: PAINLEVEL_OUTOF10: NO PAIN (0)

## 2025-06-04 NOTE — LETTER
"6/4/2025      Quiana Dunaway  [address] [facility] [address]      Dear Colleague,    Thank you for referring your patient, Quiana Dunaway, to the [facility]. Please see a copy of my visit note below.    Oncology Rooming Note    June 4, 2025 9:16 AM   Quiana Dunaway is a 89 year old female who presents for:    Chief Complaint   Patient presents with     Oncology Clinic Visit     Initial Vitals: BP (!) 159/77   Pulse 87   Temp 97.7  F (36.5  C) (Oral)   Resp 18   Wt 61.7 kg (136 lb)   LMP  (LMP Unknown)   SpO2 95%   BMI 25.70 kg/m   Estimated body mass index is 25.7 kg/m  as calculated from the following:    Height as of 5/19/25: 1.549 m (5' 1\").    Weight as of this encounter: 61.7 kg (136 lb). Body surface area is 1.63 meters squared.  No Pain (0) Comment: Data Unavailable   No LMP recorded (lmp unknown). Patient is postmenopausal.  Allergies reviewed: Yes  Medications reviewed: Yes    Medications: Medication refills not needed today.  Pharmacy name entered into [employer]:    [facility] - [place], [place] - [address]  [id number] - [place], [place] - [address] AT [facility]    Frailty Screening:   Is the patient here for a new oncology consult visit in cancer care? 2. No    PHQ9:  Did this patient require a PHQ9?: No      Clinical concerns: feeling nauseous and weak - Dr. Parry notified.     Shari J. Schoenberger, [facility]              HEMATOLOGY HISTORY: Mrs. Dunaway is a lady with multiple myeloma (IgG kappa). High risk, t(14;16).    1.  Evaluated for cytopenia in 2017.  -On 06/21/2017, WBC of 3.3, hemoglobin 9.6 and platelet 149.  - On 05/01/2017, normal creatinine of 0.66 and normal calcium of 8.9.  -SPEP on 07/07/2017 revealed M-spike of 1.8.    2. Bone marrow biopsy on 07/12/2017 reveals kappa monotypic plasma cell population consistent with multiple myeloma.  Bone marrow is 70% cellular.  " Plasma cell is 50-60%.     -Cytogenetics revealed multiple abnormalities.   -FISH: There is gain of chromosome 1, 5, 9, 15, and 17.  There is translocation 14;16.     3.  On 07/18/2017:  -M-spike of 1.9.   -Immunofixation reveals monoclonal IgG kappa.    -IgG level of 2970.  IgA of 15 and IgM of 175.    -Kappa free light chain of 67.7.  Lambda free light chain of 1.42.  Ratio of kappa to lambda of 47.71.    -Beta 2 microglobulin of 3.4.     4. PET scan on 07/24/2017 reveals several focal areas of increased FDG uptake consistent with multiple myeloma.  There is probable epithelial cyst in tail of the pancreas.  No associated abnormal FDG activity.  Left thyroid cysts or nodules without any FDG activity.  There is a 0.3 cm left upper lobe lung nodule.     5.  Velcade, Revlimid and dexamethasone between on 08/14/2017 and 04/30/2018. Velcade stopped after 04/30/2018.   -Revlimid and dexamethasone continued.  -Revlimid held between 12/27/2021 and 03/08/2022.  -Daratumumab added on 11/14/2022.  -Changed to daratumumab, pomalidomide and dexamethasone on 06/12/2023.  Pomalidomide dose reduced due to side effects.  Pomalidomide reduced to 1 mg a day for 2 weeks on, 2 week off in March 2025.    6. 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    7. Hospitalized between 02/18/2025 and 02/21/2025 for influenza B causing bilateral pneumonia and hypoxemic respiratory failure.     SUBJECTIVE:    Ms. Dunaway is an 89-year-old female with multiple myeloma on daratumumab, pomalidomide and dexamethasone.  She takes pomalidomide 1 mg a day for 2 weeks on and 2 week off. She takes dexamethasone 4 mg weekly.  She had been tolerating treatment well.    Her myeloma is slowly progressing.  In last few months, kappa free light chain has increased.  On 12/04/24 it was 52.17.  On 05/07/2025 it is 82.5.  Monoclonal peak also has been slowly increasing.    Patient has generalized weakness.  She is able to do all her  activities.  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    No headache.  She has chronic dizziness.  She previously has been evaluated by neurologist.  No worsening of dizziness.  With dizziness, she gets some nausea.  She has not been falling.    No headache.  No chest pain.  No shortness of breath at rest.  No abdominal pain.  No vomiting.  No urinary complaints.  No bowel problem.  No bleeding.     Patient has dental problem.  She has been getting dental extraction. Because of that, we are holding Zometa.     PHYSICAL EXAMINATION:   Alert and oriented x 3.  Not in any respiratory distress.  Vitals: Reviewed.  Rest of the system is not examined.     LABORATORY: CBC and CMP reviewed.      ASSESSMENT:    1.  An 89-year-old female with multiple myeloma on daratumumab, pomalidomide and dexamethasone. Myeloma is progressing.   2.  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   3.  Chronic dizziness. Stable.  4.  Generalized weakness from myeloma, anemia, other medical problems and old age.    5.  Thrombocytopenia.  6.  Dental problem.     PLAN:  -Continue pomalidomide at 1 mg a day.  Patient will take it for 2 weeks on and 2 weeks off.  -Continue daratumumab.  -Continue weekly dexamethasone at 4 mg.  -Continue to hold Zometa.  -Continue aspirin and acyclovir.  -PET scan soon.  -CAR-T therapy evaluation at [facility].  -See me with next daratumumab.     DISCUSSION:  1.  Discussed with the patient and her daughter regarding multiple myeloma.  Labs were reviewed.  Myeloma labs from today are pending.    I reviewed her myeloma labs for last few months.  Ina free light chain and monoclonal peak is slowly increasing.  This is all indicative of progression of disease.  Some of this worsening is due to holding treatment for her hospitalization and also because of dose reduction of pomalidomide.    I discussed regarding further evaluation.  Will get a PET scan.  After that we will decide regarding  bone marrow biopsy.    2.  We discussed regarding treatment.  Patient is currently on daratumumab, pomalidomide and dexamethasone.  Daratumumab is monthly.  Pomalidomide is 1 mg a day for 2 weeks on and 2 week off.  She is on dexamethasone 4 mg weekly.  Pomalidomide had to be decreased because of toxicity.    We discussed regarding changing treatment for myeloma.  One option is carfilzomib based treatment.  I also discussed regarding CAR-T therapy and bispecific antibodies.  I would favor CAR-T therapy.    Discussed regarding getting an evaluation at [facility] for CAR-T therapy.  She is agreeable for it.    3.  Will continue to hold Zometa until dental extraction is complete.    4.  She will continue on prophylactic aspirin and acyclovir.    5.  She and her daughter had few questions which were all answered.  I will see her in a month with next treatment.    Total visit time of 40 minutes.  Time spent in today's visit, review of chart/investigations today, monitoring for toxicity of high risk medications and documentation.        Again, thank you for allowing me to participate in the care of your patient.        Sincerely,        Alex Parry MD    Electronically signed

## 2025-06-04 NOTE — PROGRESS NOTES
Infusion Nursing Note:  Quiana Dunaway presents today for C31D1 Darzalex Faspro.    Patient seen by provider today: Yes: Dr. Parry   present during visit today: Not Applicable.    Note: ANC 1.1, okay to proceed with treatment per Juaquin Hammond NP      Intravenous Access:  No Intravenous access/labs at this visit.    Treatment Conditions:  Lab Results   Component Value Date    HGB 10.6 (L) 06/04/2025    WBC 5.9 06/04/2025    ANEU 1.1 (L) 06/04/2025     (L) 06/04/2025        Lab Results   Component Value Date     06/04/2025    POTASSIUM 4.1 06/04/2025    MAG 2.4 (H) 05/26/2020    CR 0.71 06/04/2025    HUMBERTO 8.3 (L) 06/04/2025    BILITOTAL 0.4 06/04/2025    ALBUMIN 3.8 06/04/2025    ALT 7 06/04/2025    AST 8 06/04/2025       Results reviewed, labs MET treatment parameters, ok to proceed with treatment.      Post Infusion Assessment:  Patient tolerated injection without incident.  Site patent and intact, free from redness, edema or discomfort.  No evidence of extravasations.       Discharge Plan:   Patient declined prescription refills.  Discharge instructions reviewed with: Patient.  Patient and/or family verbalized understanding of discharge instructions and all questions answered.  AVS to patient via [email or web address].    Patient discharged in stable condition accompanied by: self and daughter.  Departure Mode: Ambulatory.      Hiram Chu RN

## 2025-06-04 NOTE — PROGRESS NOTES
HEMATOLOGY HISTORY: Mrs. Dunaway is a lady with multiple myeloma (IgG kappa). High risk, t(14;16).    1.  Evaluated for cytopenia in 2017.  -On 06/21/2017, WBC of 3.3, hemoglobin 9.6 and platelet 149.  - On 05/01/2017, normal creatinine of 0.66 and normal calcium of 8.9.  -SPEP on 07/07/2017 revealed M-spike of 1.8.    2. Bone marrow biopsy on 07/12/2017 reveals kappa monotypic plasma cell population consistent with multiple myeloma.  Bone marrow is 70% cellular.  Plasma cell is 50-60%.     -Cytogenetics revealed multiple abnormalities.   -FISH: There is gain of chromosome 1, 5, 9, 15, and 17.  There is translocation 14;16.     3.  On 07/18/2017:  -M-spike of 1.9.   -Immunofixation reveals monoclonal IgG kappa.    -IgG level of 2970.  IgA of 15 and IgM of 175.    -Kappa free light chain of 67.7.  Lambda free light chain of 1.42.  Ratio of kappa to lambda of 47.71.    -Beta 2 microglobulin of 3.4.     4. PET scan on 07/24/2017 reveals several focal areas of increased FDG uptake consistent with multiple myeloma.  There is probable epithelial cyst in tail of the pancreas.  No associated abnormal FDG activity.  Left thyroid cysts or nodules without any FDG activity.  There is a 0.3 cm left upper lobe lung nodule.     5.  Velcade, Revlimid and dexamethasone between on 08/14/2017 and 04/30/2018. Velcade stopped after 04/30/2018.   -Revlimid and dexamethasone continued.  -Revlimid held between 12/27/2021 and 03/08/2022.  -Daratumumab added on 11/14/2022.  -Changed to daratumumab, pomalidomide and dexamethasone on 06/12/2023.  Pomalidomide dose reduced due to side effects.  Pomalidomide reduced to 1 mg a day for 2 weeks on, 2 week off in March 2025.    6. 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    7. Hospitalized between 02/18/2025 and 02/21/2025 for influenza B causing bilateral pneumonia and hypoxemic respiratory failure.     SUBJECTIVE:    Ms. Dunaway is an 89-year-old female with  multiple myeloma on daratumumab, pomalidomide and dexamethasone.  She takes pomalidomide 1 mg a day for 2 weeks on and 2 week off. She takes dexamethasone 4 mg weekly.  She had been tolerating treatment well.    Her myeloma is slowly progressing.  In last few months, kappa free light chain has increased.  On 12/04/24 it was 52.17.  On 05/07/2025 it is 82.5.  Monoclonal peak also has been slowly increasing.    Patient has generalized weakness.  She is able to do all her activities.  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    No headache.  She has chronic dizziness.  She previously has been evaluated by neurologist.  No worsening of dizziness.  With dizziness, she gets some nausea.  She has not been falling.    No headache.  No chest pain.  No shortness of breath at rest.  No abdominal pain.  No vomiting.  No urinary complaints.  No bowel problem.  No bleeding.     Patient has dental problem.  She has been getting dental extraction. Because of that, we are holding Zometa.     PHYSICAL EXAMINATION:   Alert and oriented x 3.  Not in any respiratory distress.  Vitals: Reviewed.  Rest of the system is not examined.     LABORATORY: CBC and CMP reviewed.      ASSESSMENT:    1.  An 89-year-old female with multiple myeloma on daratumumab, pomalidomide and dexamethasone. Myeloma is progressing.   2.  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   3.  Chronic dizziness. Stable.  4.  Generalized weakness from myeloma, anemia, other medical problems and old age.    5.  Thrombocytopenia.  6.  Dental problem.     PLAN:  -Continue pomalidomide at 1 mg a day.  Patient will take it for 2 weeks on and 2 weeks off.  -Continue daratumumab.  -Continue weekly dexamethasone at 4 mg.  -Continue to hold Zometa.  -Continue aspirin and acyclovir.  -PET scan soon.  -CAR-T therapy evaluation at [facility].  -See me with next daratumumab.     DISCUSSION:  1.  Discussed with the patient and her daughter regarding  multiple myeloma.  Labs were reviewed.  Myeloma labs from today are pending.    I reviewed her myeloma labs for last few months.  [place] free light chain and monoclonal peak is slowly increasing.  This is all indicative of progression of disease.  Some of this worsening is due to holding treatment for her hospitalization and also because of dose reduction of pomalidomide.    I discussed regarding further evaluation.  Will get a PET scan.  After that we will decide regarding bone marrow biopsy.    2.  We discussed regarding treatment.  Patient is currently on daratumumab, pomalidomide and dexamethasone.  Daratumumab is monthly.  Pomalidomide is 1 mg a day for 2 weeks on and 2 week off.  She is on dexamethasone 4 mg weekly.  Pomalidomide had to be decreased because of toxicity.    We discussed regarding changing treatment for myeloma.  One option is carfilzomib based treatment.  I also discussed regarding CAR-T therapy and bispecific antibodies.  I would favor CAR-T therapy.    Discussed regarding getting an evaluation at [facility] for CAR-T therapy.  She is agreeable for it.    3.  Will continue to hold Zometa until dental extraction is complete.    4.  She will continue on prophylactic aspirin and acyclovir.    5.  She and her daughter had few questions which were all answered.  I will see her in a month with next treatment.    Total visit time of 40 minutes.  Time spent in today's visit, review of chart/investigations today, monitoring for toxicity of high risk medications and documentation.

## 2025-06-04 NOTE — PATIENT INSTRUCTIONS
-Continue pomalidomide at 1 mg a day.  Patient will take it for 2 weeks on and 2 weeks off.  -Continue daratumumab.  -Continue weekly dexamethasone at 4 mg.  -Continue to hold Zometa.  -Continue aspirin and acyclovir.  -PET scan soon.  -CAR-T therapy evaluation at [facility].  -See me with next daratumumab.

## 2025-06-04 NOTE — PROGRESS NOTES
"Oncology Rooming Note    June 4, 2025 9:16 AM   Quiana Dunaway is a 89 year old female who presents for:    Chief Complaint   Patient presents with    Oncology Clinic Visit     Initial Vitals: BP (!) 159/77   Pulse 87   Temp 97.7  F (36.5  C) (Oral)   Resp 18   Wt 61.7 kg (136 lb)   LMP  (LMP Unknown)   SpO2 95%   BMI 25.70 kg/m   Estimated body mass index is 25.7 kg/m  as calculated from the following:    Height as of 5/19/25: 1.549 m (5' 1\").    Weight as of this encounter: 61.7 kg (136 lb). Body surface area is 1.63 meters squared.  No Pain (0) Comment: Data Unavailable   No LMP recorded (lmp unknown). Patient is postmenopausal.  Allergies reviewed: Yes  Medications reviewed: Yes    Medications: Medication refills not needed today.  Pharmacy name entered into [employer]:    [facility] - [place], [place] - [address]  [id number] - [place], [place] - [address] [facility]    Frailty Screening:   Is the patient here for a new oncology consult visit in cancer care? 2. No    PHQ9:  Did this patient require a PHQ9?: No      Clinical concerns: feeling nauseous and weak - Dr. Parry notified.     Shari J. Schoenberger, [facility]            "

## 2025-06-05 DIAGNOSIS — C90.00 MULTIPLE MYELOMA NOT HAVING ACHIEVED REMISSION (H): Primary | ICD-10-CM

## 2025-06-05 LAB
ALBUMIN SERPL ELPH-MCNC: 3.8 G/DL (ref 3.7–5.1)
ALPHA1 GLOB SERPL ELPH-MCNC: 0.3 G/DL (ref 0.2–0.4)
ALPHA2 GLOB SERPL ELPH-MCNC: 0.6 G/DL (ref 0.5–0.9)
B-GLOBULIN SERPL ELPH-MCNC: 0.5 G/DL (ref 0.6–1)
GAMMA GLOB SERPL ELPH-MCNC: 1.1 G/DL (ref 0.7–1.6)
IGG SERPL-MCNC: 1256 MG/DL (ref 610–1616)
KAPPA LC FREE SER-MCNC: 72.65 MG/DL (ref 0.33–1.94)
KAPPA LC FREE/LAMBDA FREE SER NEPH: 330.23 {RATIO} (ref 0.26–1.65)
LAMBDA LC FREE SERPL-MCNC: 0.22 MG/DL (ref 0.57–2.63)
LOCATION OF TASK: ABNORMAL
M PROTEIN SERPL ELPH-MCNC: 1 G/DL
PROT PATTERN SERPL ELPH-IMP: ABNORMAL

## 2025-06-06 NOTE — RESULT ENCOUNTER NOTE
Dear Ms. Dunaway,    [place] free light chain level is better at 72.65. We will continue to monitor it.    Please, call me with any questions.    Alex Parry MD

## 2025-06-09 ENCOUNTER — TELEPHONE (OUTPATIENT)
Dept: TRANSPLANT | Facility: CLINIC | Age: 89
End: 2025-06-09
Payer: MEDICARE

## 2025-06-18 ENCOUNTER — ANCILLARY PROCEDURE (OUTPATIENT)
Dept: CARDIOLOGY | Facility: CLINIC | Age: 89
End: 2025-06-18
Attending: INTERNAL MEDICINE
Payer: MEDICARE

## 2025-06-18 DIAGNOSIS — Z95.0 CARDIAC PACEMAKER IN SITU: Primary | ICD-10-CM

## 2025-06-18 DIAGNOSIS — Z95.0 CARDIAC PACEMAKER IN SITU: ICD-10-CM

## 2025-06-18 DIAGNOSIS — I49.5 SICK SINUS SYNDROME (H): ICD-10-CM

## 2025-06-18 PROCEDURE — 93294 REM INTERROG EVL PM/LDLS PM: CPT | Performed by: INTERNAL MEDICINE

## 2025-06-18 PROCEDURE — 93296 REM INTERROG EVL PM/IDS: CPT | Performed by: INTERNAL MEDICINE

## 2025-06-19 LAB
MDC_IDC_EPISODE_DTM: NORMAL
MDC_IDC_EPISODE_DURATION: 1312 S
MDC_IDC_EPISODE_DURATION: 160 S
MDC_IDC_EPISODE_DURATION: 167 S
MDC_IDC_EPISODE_DURATION: 181 S
MDC_IDC_EPISODE_DURATION: 1864 S
MDC_IDC_EPISODE_DURATION: 190 S
MDC_IDC_EPISODE_DURATION: 190 S
MDC_IDC_EPISODE_DURATION: 199 S
MDC_IDC_EPISODE_DURATION: 229 S
MDC_IDC_EPISODE_DURATION: 237 S
MDC_IDC_EPISODE_DURATION: 248 S
MDC_IDC_EPISODE_DURATION: 254 S
MDC_IDC_EPISODE_DURATION: 260 S
MDC_IDC_EPISODE_DURATION: 265 S
MDC_IDC_EPISODE_DURATION: 269 S
MDC_IDC_EPISODE_DURATION: 272 S
MDC_IDC_EPISODE_DURATION: 3188 S
MDC_IDC_EPISODE_DURATION: 337 S
MDC_IDC_EPISODE_DURATION: 388 S
MDC_IDC_EPISODE_DURATION: 500 S
MDC_IDC_EPISODE_DURATION: 532 S
MDC_IDC_EPISODE_DURATION: 539 S
MDC_IDC_EPISODE_DURATION: 5417 S
MDC_IDC_EPISODE_DURATION: 5956 S
MDC_IDC_EPISODE_DURATION: 688 S
MDC_IDC_EPISODE_DURATION: 736 S
MDC_IDC_EPISODE_DURATION: 84 S
MDC_IDC_EPISODE_DURATION: 841 S
MDC_IDC_EPISODE_ID: 378
MDC_IDC_EPISODE_ID: 379
MDC_IDC_EPISODE_ID: 380
MDC_IDC_EPISODE_ID: 381
MDC_IDC_EPISODE_ID: 382
MDC_IDC_EPISODE_ID: 383
MDC_IDC_EPISODE_ID: 384
MDC_IDC_EPISODE_ID: 385
MDC_IDC_EPISODE_ID: 386
MDC_IDC_EPISODE_ID: 387
MDC_IDC_EPISODE_ID: 388
MDC_IDC_EPISODE_ID: 389
MDC_IDC_EPISODE_ID: 390
MDC_IDC_EPISODE_ID: 391
MDC_IDC_EPISODE_ID: 392
MDC_IDC_EPISODE_ID: 393
MDC_IDC_EPISODE_ID: 394
MDC_IDC_EPISODE_ID: 395
MDC_IDC_EPISODE_ID: 396
MDC_IDC_EPISODE_ID: 397
MDC_IDC_EPISODE_ID: 398
MDC_IDC_EPISODE_ID: 399
MDC_IDC_EPISODE_ID: 400
MDC_IDC_EPISODE_ID: 401
MDC_IDC_EPISODE_ID: 402
MDC_IDC_EPISODE_ID: 403
MDC_IDC_EPISODE_ID: 404
MDC_IDC_EPISODE_ID: 405
MDC_IDC_EPISODE_TYPE: NORMAL
MDC_IDC_LEAD_CONNECTION_STATUS: NORMAL
MDC_IDC_LEAD_CONNECTION_STATUS: NORMAL
MDC_IDC_LEAD_IMPLANT_DT: NORMAL
MDC_IDC_LEAD_IMPLANT_DT: NORMAL
MDC_IDC_LEAD_LOCATION: NORMAL
MDC_IDC_LEAD_LOCATION: NORMAL
MDC_IDC_LEAD_LOCATION_DETAIL_1: NORMAL
MDC_IDC_LEAD_LOCATION_DETAIL_1: NORMAL
MDC_IDC_LEAD_MFG: NORMAL
MDC_IDC_LEAD_MFG: NORMAL
MDC_IDC_LEAD_MODEL: NORMAL
MDC_IDC_LEAD_MODEL: NORMAL
MDC_IDC_LEAD_POLARITY_TYPE: NORMAL
MDC_IDC_LEAD_POLARITY_TYPE: NORMAL
MDC_IDC_LEAD_SERIAL: NORMAL
MDC_IDC_LEAD_SERIAL: NORMAL
MDC_IDC_MSMT_BATTERY_DTM: NORMAL
MDC_IDC_MSMT_BATTERY_REMAINING_LONGEVITY: 1 MO
MDC_IDC_MSMT_BATTERY_RRT_TRIGGER: 2.83
MDC_IDC_MSMT_BATTERY_STATUS: NORMAL
MDC_IDC_MSMT_BATTERY_VOLTAGE: 2.83 V
MDC_IDC_MSMT_LEADCHNL_RA_IMPEDANCE_VALUE: 323 OHM
MDC_IDC_MSMT_LEADCHNL_RA_IMPEDANCE_VALUE: 342 OHM
MDC_IDC_MSMT_LEADCHNL_RA_PACING_THRESHOLD_AMPLITUDE: 0.5 V
MDC_IDC_MSMT_LEADCHNL_RA_PACING_THRESHOLD_PULSEWIDTH: 0.4 MS
MDC_IDC_MSMT_LEADCHNL_RA_SENSING_INTR_AMPL: 1.5 MV
MDC_IDC_MSMT_LEADCHNL_RA_SENSING_INTR_AMPL: 1.5 MV
MDC_IDC_MSMT_LEADCHNL_RV_IMPEDANCE_VALUE: 380 OHM
MDC_IDC_MSMT_LEADCHNL_RV_IMPEDANCE_VALUE: 494 OHM
MDC_IDC_MSMT_LEADCHNL_RV_PACING_THRESHOLD_AMPLITUDE: 1.38 V
MDC_IDC_MSMT_LEADCHNL_RV_PACING_THRESHOLD_PULSEWIDTH: 0.4 MS
MDC_IDC_MSMT_LEADCHNL_RV_SENSING_INTR_AMPL: 10.25 MV
MDC_IDC_MSMT_LEADCHNL_RV_SENSING_INTR_AMPL: 10.25 MV
MDC_IDC_PG_IMPLANT_DTM: NORMAL
MDC_IDC_PG_MFG: NORMAL
MDC_IDC_PG_MODEL: NORMAL
MDC_IDC_PG_SERIAL: NORMAL
MDC_IDC_PG_TYPE: NORMAL
MDC_IDC_SESS_CLINIC_NAME: NORMAL
MDC_IDC_SESS_DTM: NORMAL
MDC_IDC_SESS_TYPE: NORMAL
MDC_IDC_SET_BRADY_AT_MODE_SWITCH_RATE: 171 {BEATS}/MIN
MDC_IDC_SET_BRADY_HYSTRATE: NORMAL
MDC_IDC_SET_BRADY_LOWRATE: 60 {BEATS}/MIN
MDC_IDC_SET_BRADY_MAX_SENSOR_RATE: 120 {BEATS}/MIN
MDC_IDC_SET_BRADY_MAX_TRACKING_RATE: 120 {BEATS}/MIN
MDC_IDC_SET_BRADY_MODE: NORMAL
MDC_IDC_SET_BRADY_PAV_DELAY_LOW: 200 MS
MDC_IDC_SET_BRADY_SAV_DELAY_LOW: 180 MS
MDC_IDC_SET_LEADCHNL_RA_PACING_AMPLITUDE: 1.5 V
MDC_IDC_SET_LEADCHNL_RA_PACING_ANODE_ELECTRODE_1: NORMAL
MDC_IDC_SET_LEADCHNL_RA_PACING_ANODE_LOCATION_1: NORMAL
MDC_IDC_SET_LEADCHNL_RA_PACING_CAPTURE_MODE: NORMAL
MDC_IDC_SET_LEADCHNL_RA_PACING_CATHODE_ELECTRODE_1: NORMAL
MDC_IDC_SET_LEADCHNL_RA_PACING_CATHODE_LOCATION_1: NORMAL
MDC_IDC_SET_LEADCHNL_RA_PACING_POLARITY: NORMAL
MDC_IDC_SET_LEADCHNL_RA_PACING_PULSEWIDTH: 0.4 MS
MDC_IDC_SET_LEADCHNL_RA_SENSING_ANODE_ELECTRODE_1: NORMAL
MDC_IDC_SET_LEADCHNL_RA_SENSING_ANODE_LOCATION_1: NORMAL
MDC_IDC_SET_LEADCHNL_RA_SENSING_CATHODE_ELECTRODE_1: NORMAL
MDC_IDC_SET_LEADCHNL_RA_SENSING_CATHODE_LOCATION_1: NORMAL
MDC_IDC_SET_LEADCHNL_RA_SENSING_POLARITY: NORMAL
MDC_IDC_SET_LEADCHNL_RA_SENSING_SENSITIVITY: 0.3 MV
MDC_IDC_SET_LEADCHNL_RV_PACING_AMPLITUDE: 2.75 V
MDC_IDC_SET_LEADCHNL_RV_PACING_ANODE_ELECTRODE_1: NORMAL
MDC_IDC_SET_LEADCHNL_RV_PACING_ANODE_LOCATION_1: NORMAL
MDC_IDC_SET_LEADCHNL_RV_PACING_CAPTURE_MODE: NORMAL
MDC_IDC_SET_LEADCHNL_RV_PACING_CATHODE_ELECTRODE_1: NORMAL
MDC_IDC_SET_LEADCHNL_RV_PACING_CATHODE_LOCATION_1: NORMAL
MDC_IDC_SET_LEADCHNL_RV_PACING_POLARITY: NORMAL
MDC_IDC_SET_LEADCHNL_RV_PACING_PULSEWIDTH: 0.4 MS
MDC_IDC_SET_LEADCHNL_RV_SENSING_ANODE_ELECTRODE_1: NORMAL
MDC_IDC_SET_LEADCHNL_RV_SENSING_ANODE_LOCATION_1: NORMAL
MDC_IDC_SET_LEADCHNL_RV_SENSING_CATHODE_ELECTRODE_1: NORMAL
MDC_IDC_SET_LEADCHNL_RV_SENSING_CATHODE_LOCATION_1: NORMAL
MDC_IDC_SET_LEADCHNL_RV_SENSING_POLARITY: NORMAL
MDC_IDC_SET_LEADCHNL_RV_SENSING_SENSITIVITY: 0.9 MV
MDC_IDC_SET_ZONE_DETECTION_INTERVAL: 350 MS
MDC_IDC_SET_ZONE_DETECTION_INTERVAL: 400 MS
MDC_IDC_SET_ZONE_STATUS: NORMAL
MDC_IDC_SET_ZONE_STATUS: NORMAL
MDC_IDC_SET_ZONE_TYPE: NORMAL
MDC_IDC_SET_ZONE_VENDOR_TYPE: NORMAL
MDC_IDC_STAT_AT_BURDEN_PERCENT: 0.8 %
MDC_IDC_STAT_AT_DTM_END: NORMAL
MDC_IDC_STAT_AT_DTM_START: NORMAL
MDC_IDC_STAT_BRADY_AP_VP_PERCENT: 93.09 %
MDC_IDC_STAT_BRADY_AP_VS_PERCENT: 0.01 %
MDC_IDC_STAT_BRADY_AS_VP_PERCENT: 6.89 %
MDC_IDC_STAT_BRADY_AS_VS_PERCENT: 0 %
MDC_IDC_STAT_BRADY_DTM_END: NORMAL
MDC_IDC_STAT_BRADY_DTM_START: NORMAL
MDC_IDC_STAT_BRADY_RA_PERCENT_PACED: 92.84 %
MDC_IDC_STAT_BRADY_RV_PERCENT_PACED: 99.96 %
MDC_IDC_STAT_EPISODE_RECENT_COUNT: 0
MDC_IDC_STAT_EPISODE_RECENT_COUNT: 28
MDC_IDC_STAT_EPISODE_RECENT_COUNT_DTM_END: NORMAL
MDC_IDC_STAT_EPISODE_RECENT_COUNT_DTM_START: NORMAL
MDC_IDC_STAT_EPISODE_TOTAL_COUNT: 0
MDC_IDC_STAT_EPISODE_TOTAL_COUNT: 0
MDC_IDC_STAT_EPISODE_TOTAL_COUNT: 15
MDC_IDC_STAT_EPISODE_TOTAL_COUNT: 387
MDC_IDC_STAT_EPISODE_TOTAL_COUNT_DTM_END: NORMAL
MDC_IDC_STAT_EPISODE_TOTAL_COUNT_DTM_START: NORMAL
MDC_IDC_STAT_EPISODE_TYPE: NORMAL
MDC_IDC_STAT_TACHYTHERAPY_RECENT_DTM_END: NORMAL
MDC_IDC_STAT_TACHYTHERAPY_RECENT_DTM_START: NORMAL
MDC_IDC_STAT_TACHYTHERAPY_TOTAL_DTM_END: NORMAL
MDC_IDC_STAT_TACHYTHERAPY_TOTAL_DTM_START: NORMAL

## 2025-06-23 ENCOUNTER — HOSPITAL ENCOUNTER (OUTPATIENT)
Dept: PET IMAGING | Facility: CLINIC | Age: 89
Discharge: HOME | End: 2025-06-23
Attending: INTERNAL MEDICINE | Admitting: INTERNAL MEDICINE
Payer: MEDICARE

## 2025-06-23 DIAGNOSIS — C90.00 MULTIPLE MYELOMA NOT HAVING ACHIEVED REMISSION (H): ICD-10-CM

## 2025-06-23 PROCEDURE — 343N000001 HC RX 343 MED OP 636: Performed by: INTERNAL MEDICINE

## 2025-06-23 PROCEDURE — A9552 F18 FDG: HCPCS | Performed by: INTERNAL MEDICINE

## 2025-06-23 PROCEDURE — 78816 PET IMAGE W/CT FULL BODY: CPT | Mod: PI

## 2025-06-23 RX ORDER — FLUDEOXYGLUCOSE F 18 200 MCI/ML
10-18 INJECTION, SOLUTION INTRAVENOUS ONCE
Status: COMPLETED | OUTPATIENT
Start: 2025-06-23 | End: 2025-06-23

## 2025-06-23 RX ADMIN — FLUDEOXYGLUCOSE F 18 12.55 MILLICURIE: 200 INJECTION, SOLUTION INTRAVENOUS at 12:35

## 2025-06-26 ENCOUNTER — RESULTS FOLLOW-UP (OUTPATIENT)
Dept: ONCOLOGY | Facility: CLINIC | Age: 89
End: 2025-06-26

## 2025-06-30 ENCOUNTER — CARE COORDINATION (OUTPATIENT)
Dept: TRANSPLANT | Facility: CLINIC | Age: 89
End: 2025-06-30
Payer: MEDICARE

## 2025-06-30 DIAGNOSIS — Z11.4 ENCOUNTER FOR SCREENING FOR HUMAN IMMUNODEFICIENCY VIRUS (HIV): ICD-10-CM

## 2025-06-30 DIAGNOSIS — Z11.59 ENCOUNTER FOR SCREENING FOR OTHER VIRAL DISEASES: ICD-10-CM

## 2025-06-30 DIAGNOSIS — C90.00 MULTIPLE MYELOMA NOT HAVING ACHIEVED REMISSION (H): Primary | ICD-10-CM

## 2025-06-30 DIAGNOSIS — Z72.89 OTHER PROBLEMS RELATED TO LIFESTYLE: ICD-10-CM

## 2025-06-30 NOTE — PROGRESS NOTES
[facility]MT and Cell Therapy Program  RN Coordinator Pre-Visit Documentation      Quiana Dunaway is a 89 year old female who has been referred to the [facility]MT and Cell Therapy Program for hematopoietic cell transplant or immune effector cell therapy.      Referring MD Name: Dr Sohan Johnson,    Referring RN Coordinator:     Reason for referral: MM    Link to BMT & CT Program Algorithms        For CAR-T Candidates Only:    Orders placed for virologies: Yes      All relevant clinical notes, labs, imaging, and pathology may be reviewed in Epic Bookmarks under name: Katherine Fernandez,FAUSTINO      Patient Care Team         Relationship Specialty Notifications Start End    César Chung MD PCP - General Internal Medicine Yes. All results, Admissions 4/21/23     Phone: [phone] Pager: [phone] Fax: [phone] [address] [place] [address]    César Chung MD MD Internal Medicine  3/9/13     Phone: [phone] Pager: [phone] Fax: [phone] [address] [place] [address]    César Chung MD Assigned PCP   2/19/17     Phone: [phone] Pager: [phone] Fax: [phone] [address] [facility] [address]    Marcel Marc MD MD Pulmonary Disease Admissions 6/21/21     Phone: [phone] Fax: [phone]         [address] [place] [address]    Alecia Briggs RN Specialty Care Coordinator Hematology & Oncology Admissions 11/4/22     Phone: [phone]         Bella Chowdhury, PharmD Pharmacist Pharmacist  12/19/22     Phone: [phone] Fax: [phone] [address] [place] [address]    César Wolfe MD MD Neurology  4/17/23     Phone: [phone] Fax: [phone]         [address] [place] [address]    Juaquin Hammond APRN CNP Nurse Practitioner Hematology & Oncology  4/17/23     Phone: [phone] Fax: [phone] [address] [facility] [address]     Imelda Benites PA-C Physician Assistant Cardiovascular Disease  3/11/25     Phone: [phone] Fax: [phone] [address] [place] [address]    Alex Parry MD Assigned Cancer Care Provider   6/23/25     Phone: [phone] Fax: [phone] [address] [place] [address]              Katherine Fernandez RN

## 2025-07-01 ENCOUNTER — ONCOLOGY VISIT (OUTPATIENT)
Dept: ONCOLOGY | Facility: CLINIC | Age: 89
End: 2025-07-01
Attending: INTERNAL MEDICINE
Payer: MEDICARE

## 2025-07-01 ENCOUNTER — LAB (OUTPATIENT)
Dept: INFUSION THERAPY | Facility: CLINIC | Age: 89
End: 2025-07-01
Attending: INTERNAL MEDICINE
Payer: MEDICARE

## 2025-07-01 VITALS
TEMPERATURE: 98.5 F | WEIGHT: 134 LBS | SYSTOLIC BLOOD PRESSURE: 165 MMHG | BODY MASS INDEX: 25.32 KG/M2 | OXYGEN SATURATION: 94 % | RESPIRATION RATE: 16 BRPM | HEART RATE: 88 BPM | DIASTOLIC BLOOD PRESSURE: 93 MMHG

## 2025-07-01 DIAGNOSIS — C90.00 MULTIPLE MYELOMA NOT HAVING ACHIEVED REMISSION (H): Primary | ICD-10-CM

## 2025-07-01 DIAGNOSIS — C90.02 MULTIPLE MYELOMA IN RELAPSE (H): ICD-10-CM

## 2025-07-01 LAB
ALBUMIN SERPL BCG-MCNC: 3.8 G/DL (ref 3.5–5.2)
ALP SERPL-CCNC: 64 U/L (ref 40–150)
ALT SERPL W P-5'-P-CCNC: 7 U/L (ref 0–50)
ANION GAP SERPL CALCULATED.3IONS-SCNC: 8 MMOL/L (ref 7–15)
AST SERPL W P-5'-P-CCNC: 13 U/L (ref 0–45)
BASOPHILS # BLD AUTO: 0.1 10E3/UL (ref 0–0.2)
BASOPHILS NFR BLD AUTO: 1 %
BILIRUB SERPL-MCNC: 0.5 MG/DL
BUN SERPL-MCNC: 16.8 MG/DL (ref 8–23)
CALCIUM SERPL-MCNC: 9 MG/DL (ref 8.8–10.4)
CHLORIDE SERPL-SCNC: 107 MMOL/L (ref 98–107)
CREAT SERPL-MCNC: 0.7 MG/DL (ref 0.51–0.95)
DACRYOCYTES BLD QL SMEAR: SLIGHT
EGFRCR SERPLBLD CKD-EPI 2021: 83 ML/MIN/1.73M2
ELLIPTOCYTES BLD QL SMEAR: SLIGHT
EOSINOPHIL # BLD AUTO: 0.1 10E3/UL (ref 0–0.7)
EOSINOPHIL NFR BLD AUTO: 2 %
ERYTHROCYTE [DISTWIDTH] IN BLOOD BY AUTOMATED COUNT: 15.9 % (ref 10–15)
GLUCOSE SERPL-MCNC: 85 MG/DL (ref 70–99)
HCO3 SERPL-SCNC: 26 MMOL/L (ref 22–29)
HCT VFR BLD AUTO: 32 % (ref 35–47)
HGB BLD-MCNC: 10.5 G/DL (ref 11.7–15.7)
IMM GRANULOCYTES # BLD: 0 10E3/UL
IMM GRANULOCYTES NFR BLD: 0 %
LYMPHOCYTES # BLD AUTO: 2.5 10E3/UL (ref 0.8–5.3)
LYMPHOCYTES NFR BLD AUTO: 43 %
MCH RBC QN AUTO: 29.5 PG (ref 26.5–33)
MCHC RBC AUTO-ENTMCNC: 32.8 G/DL (ref 31.5–36.5)
MCV RBC AUTO: 90 FL (ref 78–100)
MONOCYTES # BLD AUTO: 1.9 10E3/UL (ref 0–1.3)
MONOCYTES NFR BLD AUTO: 33 %
NEUTROPHILS # BLD AUTO: 1.2 10E3/UL (ref 1.6–8.3)
NEUTROPHILS NFR BLD AUTO: 20 %
NRBC # BLD AUTO: 0 10E3/UL
NRBC BLD AUTO-RTO: 0 /100
PLAT MORPH BLD: ABNORMAL
PLATELET # BLD AUTO: 144 10E3/UL (ref 150–450)
POTASSIUM SERPL-SCNC: 4 MMOL/L (ref 3.4–5.3)
PROT SERPL-MCNC: 6.6 G/DL (ref 6.4–8.3)
RBC # BLD AUTO: 3.56 10E6/UL (ref 3.8–5.2)
RBC MORPH BLD: ABNORMAL
SODIUM SERPL-SCNC: 141 MMOL/L (ref 135–145)
TOTAL PROTEIN SERUM FOR ELP: 6.4 G/DL (ref 6.4–8.3)
WBC # BLD AUTO: 5.8 10E3/UL (ref 4–11)

## 2025-07-01 PROCEDURE — 82784 ASSAY IGA/IGD/IGG/IGM EACH: CPT | Performed by: INTERNAL MEDICINE

## 2025-07-01 PROCEDURE — 86140 C-REACTIVE PROTEIN: CPT

## 2025-07-01 PROCEDURE — 84155 ASSAY OF PROTEIN SERUM: CPT | Performed by: INTERNAL MEDICINE

## 2025-07-01 PROCEDURE — G0463 HOSPITAL OUTPT CLINIC VISIT: HCPCS | Performed by: INTERNAL MEDICINE

## 2025-07-01 PROCEDURE — 96401 CHEMO ANTI-NEOPL SQ/IM: CPT

## 2025-07-01 PROCEDURE — 83521 IG LIGHT CHAINS FREE EACH: CPT | Performed by: INTERNAL MEDICINE

## 2025-07-01 PROCEDURE — 84165 PROTEIN E-PHORESIS SERUM: CPT | Mod: TC | Performed by: PATHOLOGY

## 2025-07-01 PROCEDURE — 80048 BASIC METABOLIC PNL TOTAL CA: CPT | Performed by: INTERNAL MEDICINE

## 2025-07-01 PROCEDURE — 250N000011 HC RX IP 250 OP 636: Mod: JZ | Performed by: INTERNAL MEDICINE

## 2025-07-01 PROCEDURE — 99214 OFFICE O/P EST MOD 30 MIN: CPT | Performed by: INTERNAL MEDICINE

## 2025-07-01 PROCEDURE — 84165 PROTEIN E-PHORESIS SERUM: CPT | Mod: 26 | Performed by: PATHOLOGY

## 2025-07-01 PROCEDURE — 36415 COLL VENOUS BLD VENIPUNCTURE: CPT | Performed by: INTERNAL MEDICINE

## 2025-07-01 PROCEDURE — 85025 COMPLETE CBC W/AUTO DIFF WBC: CPT | Performed by: INTERNAL MEDICINE

## 2025-07-01 RX ADMIN — DARATUMUMAB AND HYALURONIDASE-FIHJ (HUMAN RECOMBINANT) 1800 MG: 1800; 30000 INJECTION SUBCUTANEOUS at 09:20

## 2025-07-01 ASSESSMENT — PAIN SCALES - GENERAL: PAINLEVEL_OUTOF10: SEVERE PAIN (8)

## 2025-07-01 NOTE — LETTER
7/1/2025      Quiana Dunaway  [address] [address]      Dear Colleague,    Thank you for referring your patient, Quiana Dunaway, to the [facility]. Please see a copy of my visit note below.    HEMATOLOGY HISTORY: Mrs. Dunaway is a lady with multiple myeloma (IgG kappa). High risk, t(14;16).     1.  Evaluated for cytopenia in 2017.  -On 06/21/2017, WBC of 3.3, hemoglobin 9.6 and platelet 149.  - On 05/01/2017, normal creatinine of 0.66 and normal calcium of 8.9.  -SPEP on 07/07/2017 revealed M-spike of 1.8.     2. Bone marrow biopsy on 07/12/2017 reveals kappa monotypic plasma cell population consistent with multiple myeloma.  Bone marrow is 70% cellular.  Plasma cell is 50-60%.     -Cytogenetics revealed multiple abnormalities.   -FISH: There is gain of chromosome 1, 5, 9, 15, and 17.  There is translocation 14;16.      3.  On 07/18/2017:  -M-spike of 1.9.   -Immunofixation reveals monoclonal IgG kappa.    -IgG level of 2970.  IgA of 15 and IgM of 175.    -Kappa free light chain of 67.7.  Lambda free light chain of 1.42.  Ratio of kappa to lambda of 47.71.    -Beta 2 microglobulin of 3.4.      4. PET scan on 07/24/2017 reveals several focal areas of increased FDG uptake consistent with multiple myeloma.  There is probable epithelial cyst in tail of the pancreas.  No associated abnormal FDG activity.  Left thyroid cysts or nodules without any FDG activity.  There is a 0.3 cm left upper lobe lung nodule.      5.  Velcade, Revlimid and dexamethasone between on 08/14/2017 and 04/30/2018. Velcade stopped after 04/30/2018.   -Revlimid and dexamethasone continued.  -Revlimid held between 12/27/2021 and 03/08/2022.  -Daratumumab added on 11/14/2022.  -Changed to daratumumab, pomalidomide and dexamethasone on 06/12/2023.  Pomalidomide dose reduced due to side effects.  Pomalidomide reduced to 1 mg a day for 2 weeks on, 2 week off in March 2025.     6. Brain MRI on  12/13/2022 does reveals 1.5 cm left parafalcine meningioma.     7. Hospitalized between 02/18/2025 and 02/21/2025 for influenza B causing bilateral pneumonia and hypoxemic respiratory failure.    8.  PET scan on 06/23/2025 reveals diffuse FDG uptake within the axial and appendicular skeleton.     SUBJECTIVE:    Ms. Dunaway is an 89-year-old female with multiple myeloma on daratumumab, pomalidomide and dexamethasone.  She takes pomalidomide 1 mg a day for 2 weeks on and 2 week off. She takes dexamethasone 4 mg weekly.  She had been tolerating treatment well.     Her myeloma is slowly progressing.  In last few months, kappa free light chain has increased.  On 12/04/24 it was 52.17.  On 05/07/2025 it is 82.5.      PET scan was done on 06/23/2025.  It reveals diffuse FDG uptake within axial and appendicular skeleton.  No lytic lesions.    Patient is scheduled for CAR-T consult tomorrow at [facility].    Her overall condition is stable. Patient has generalized weakness.  She is able to do all her activities.  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     No headache.  She has chronic dizziness.  She previously has been evaluated by neurologist.  No worsening of dizziness.       No headache.  No chest pain.  No shortness of breath at rest.  No abdominal pain.  No vomiting.  No urinary complaints.  No bowel problem.  No bleeding.     Patient has dental problem. Because of that, we are holding Zometa.     PHYSICAL EXAMINATION:   Alert and oriented x 3.  Not in any respiratory distress.  Vitals: Reviewed.  Rest of the system is not examined.     LABORATORY: CBC and CMP reviewed.      ASSESSMENT:    1.  An 89-year-old female with multiple myeloma on daratumumab, pomalidomide and dexamethasone. Myeloma is progressing.   2.  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  3.  Thrombocytopenia.  4.  Chronic dizziness. Stable.  5.  Generalized weakness from myeloma, anemia, other medical problems and  old age.    6.  Dental problem.     PLAN:  -Continue pomalidomide at 1 mg a day.  Patient will take it for 2 weeks on and 2 weeks off.  -Continue daratumumab.  -Continue weekly dexamethasone at 4 mg.  -Continue to hold Zometa.  -Continue aspirin and acyclovir.  -CAR-T therapy evaluation at [facility].  -See me with next daratumumab.     DISCUSSION:  1.  PET scan was reviewed with the patient and her daughter.  There is no lytic lesion.  There is diffuse FDG uptake within the axial and appendicular skeleton.    Labs were reviewed with her.  Kappa free light chain on 06/04/2025 was slightly better at 72.6.  In last few months, it has increased.  This is indicative of progression of disease.    2.  For now, patient will continue on treatment with daratumumab, pomalidomide and dexamethasone.    She is getting evaluation for CAR-T therapy tomorrow.  If CAR-T therapy is not an option, we will discuss regarding bispecific antibodies.    3.  Will continue to hold Zometa until dental extraction is complete.     4.  She will continue on prophylactic aspirin and acyclovir.     5.  She and her daughter had few questions which were all answered.  I will see her in a month with next treatment.     Total visit time of 30 minutes.  Time spent in today's visit, review of chart/investigations today, monitoring for toxicity of high risk medications and documentation today.        Again, thank you for allowing me to participate in the care of your patient.        Sincerely,        Alex Parry MD    Electronically signed

## 2025-07-01 NOTE — PATIENT INSTRUCTIONS
-Continue pomalidomide at 1 mg a day for 2 weeks on and 2 weeks off.  -Continue daratumumab.  -Continue weekly dexamethasone at 4 mg.  -Continue to hold Zometa.  -Continue aspirin and acyclovir.  -CAR-T therapy evaluation at [facility].  -See me with next daratumumab.

## 2025-07-01 NOTE — PROGRESS NOTES
Infusion Nursing Note:  Quiana Dunaway presents today for C32D1 Darzalex Faspro.    Patient seen by provider today: Yes: Dr. Parry   present during visit today: Not Applicable.    Note: N/A.      Intravenous Access:  No Intravenous access/labs at this visit.    Treatment Conditions:  Lab Results   Component Value Date    HGB 10.5 (L) 07/01/2025    WBC 5.8 07/01/2025    ANEU 1.2 (L) 07/01/2025     (L) 07/01/2025        Results reviewed, labs MET treatment parameters, ok to proceed with treatment.      Post Infusion Assessment:  Patient tolerated injection without incident.  Site patent and intact, free from redness, edema or discomfort.  No evidence of extravasations.       Discharge Plan:   Patient declined prescription refills.  Discharge instructions reviewed with: Patient.  Patient and/or family verbalized understanding of discharge instructions and all questions answered.  AVS to patient via [employer].  Patient will return 7/29 for next appointment.   Patient discharged in stable condition accompanied by: self and daughter.  Departure Mode: Ambulatory.      Hiram Chu RN

## 2025-07-01 NOTE — NURSING NOTE
"Oncology Rooming Note    July 1, 2025 8:27 AM   Quiana Dunaway is a 89 year old female who presents for:    Chief Complaint   Patient presents with    Oncology Clinic Visit     Initial Vitals: BP (!) 165/93   Pulse 88   Temp 98.5  F (36.9  C) (Oral)   Resp 16   Wt 60.8 kg (134 lb)   LMP  (LMP Unknown)   SpO2 94%   BMI 25.32 kg/m   Estimated body mass index is 25.32 kg/m  as calculated from the following:    Height as of 5/19/25: 1.549 m (5' 1\").    Weight as of this encounter: 60.8 kg (134 lb). Body surface area is 1.62 meters squared.  Severe Pain (8) Comment: Data Unavailable   No LMP recorded (lmp unknown). Patient is postmenopausal.  Allergies reviewed: Yes  Medications reviewed: Yes    Medications: Medication refills not needed today.  Pharmacy name entered into [employer]:    [facility] - [place], [place] - [address]  [id number] - [place], [place] - [address] [facility]    Frailty Screening:   Is the patient here for a new oncology consult visit in cancer care? 2. No    PHQ9:  Did this patient require a PHQ9?: No      Clinical concerns: follow up        Florencia Alicia            "

## 2025-07-01 NOTE — PROGRESS NOTES
HEMATOLOGY HISTORY: Mrs. Dunaway is a lady with multiple myeloma (IgG kappa). High risk, t(14;16).     1.  Evaluated for cytopenia in 2017.  -On 06/21/2017, WBC of 3.3, hemoglobin 9.6 and platelet 149.  - On 05/01/2017, normal creatinine of 0.66 and normal calcium of 8.9.  -SPEP on 07/07/2017 revealed M-spike of 1.8.     2. Bone marrow biopsy on 07/12/2017 reveals kappa monotypic plasma cell population consistent with multiple myeloma.  Bone marrow is 70% cellular.  Plasma cell is 50-60%.     -Cytogenetics revealed multiple abnormalities.   -FISH: There is gain of chromosome 1, 5, 9, 15, and 17.  There is translocation 14;16.      3.  On 07/18/2017:  -M-spike of 1.9.   -Immunofixation reveals monoclonal IgG kappa.    -IgG level of 2970.  IgA of 15 and IgM of 175.    -Kappa free light chain of 67.7.  Lambda free light chain of 1.42.  Ratio of kappa to lambda of 47.71.    -Beta 2 microglobulin of 3.4.      4. PET scan on 07/24/2017 reveals several focal areas of increased FDG uptake consistent with multiple myeloma.  There is probable epithelial cyst in tail of the pancreas.  No associated abnormal FDG activity.  Left thyroid cysts or nodules without any FDG activity.  There is a 0.3 cm left upper lobe lung nodule.      5.  Velcade, Revlimid and dexamethasone between on 08/14/2017 and 04/30/2018. Velcade stopped after 04/30/2018.   -Revlimid and dexamethasone continued.  -Revlimid held between 12/27/2021 and 03/08/2022.  -Daratumumab added on 11/14/2022.  -Changed to daratumumab, pomalidomide and dexamethasone on 06/12/2023.  Pomalidomide dose reduced due to side effects.  Pomalidomide reduced to 1 mg a day for 2 weeks on, 2 week off in March 2025.     6. 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     7. Hospitalized between 02/18/2025 and 02/21/2025 for influenza B causing bilateral pneumonia and hypoxemic respiratory failure.    8.  PET scan on 06/23/2025 reveals diffuse FDG uptake  within the axial and appendicular skeleton.     SUBJECTIVE:    Ms. Dunaway is an 89-year-old female with multiple myeloma on daratumumab, pomalidomide and dexamethasone.  She takes pomalidomide 1 mg a day for 2 weeks on and 2 week off. She takes dexamethasone 4 mg weekly.  She had been tolerating treatment well.     Her myeloma is slowly progressing.  In last few months, kappa free light chain has increased.  On 12/04/24 it was 52.17.  On 05/07/2025 it is 82.5.      PET scan was done on 06/23/2025.  It reveals diffuse FDG uptake within axial and appendicular skeleton.  No lytic lesions.    Patient is scheduled for CAR-T consult tomorrow at [facility].    Her overall condition is stable. Patient has generalized weakness.  She is able to do all her activities.  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     No headache.  She has chronic dizziness.  She previously has been evaluated by neurologist.  No worsening of dizziness.       No headache.  No chest pain.  No shortness of breath at rest.  No abdominal pain.  No vomiting.  No urinary complaints.  No bowel problem.  No bleeding.     Patient has dental problem. Because of that, we are holding Zometa.     PHYSICAL EXAMINATION:   Alert and oriented x 3.  Not in any respiratory distress.  Vitals: Reviewed.  Rest of the system is not examined.     LABORATORY: CBC and CMP reviewed.      ASSESSMENT:    1.  An 89-year-old female with multiple myeloma on daratumumab, pomalidomide and dexamethasone. Myeloma is progressing.   2.  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  3.  Thrombocytopenia.  4.  Chronic dizziness. Stable.  5.  Generalized weakness from myeloma, anemia, other medical problems and old age.    6.  Dental problem.     PLAN:  -Continue pomalidomide at 1 mg a day.  Patient will take it for 2 weeks on and 2 weeks off.  -Continue daratumumab.  -Continue weekly dexamethasone at 4 mg.  -Continue to hold Zometa.  -Continue aspirin and  acyclovir.  -CAR-T therapy evaluation at [facility].  -See me with next daratumumab.     DISCUSSION:  1.  PET scan was reviewed with the patient and her daughter.  There is no lytic lesion.  There is diffuse FDG uptake within the axial and appendicular skeleton.    Labs were reviewed with her.  Kappa free light chain on 06/04/2025 was slightly better at 72.6.  In last few months, it has increased.  This is indicative of progression of disease.    2.  For now, patient will continue on treatment with daratumumab, pomalidomide and dexamethasone.    She is getting evaluation for CAR-T therapy tomorrow.  If CAR-T therapy is not an option, we will discuss regarding bispecific antibodies.    3.  Will continue to hold Zometa until dental extraction is complete.     4.  She will continue on prophylactic aspirin and acyclovir.     5.  She and her daughter had few questions which were all answered.  I will see her in a month with next treatment.     Total visit time of 30 minutes.  Time spent in today's visit, review of chart/investigations today, monitoring for toxicity of high risk medications and documentation today.

## 2025-07-02 ENCOUNTER — MYC MEDICAL ADVICE (OUTPATIENT)
Dept: ONCOLOGY | Facility: CLINIC | Age: 89
End: 2025-07-02

## 2025-07-02 ENCOUNTER — ALLIED HEALTH/NURSE VISIT (OUTPATIENT)
Dept: TRANSPLANT | Facility: CLINIC | Age: 89
End: 2025-07-02
Payer: MEDICARE

## 2025-07-02 ENCOUNTER — OFFICE VISIT (OUTPATIENT)
Dept: TRANSPLANT | Facility: CLINIC | Age: 89
End: 2025-07-02
Payer: MEDICARE

## 2025-07-02 DIAGNOSIS — C90.02 MULTIPLE MYELOMA IN RELAPSE (H): Primary | ICD-10-CM

## 2025-07-02 DIAGNOSIS — C90.00 MULTIPLE MYELOMA NOT HAVING ACHIEVED REMISSION (H): Primary | ICD-10-CM

## 2025-07-02 DIAGNOSIS — C90.00 MULTIPLE MYELOMA, REMISSION STATUS UNSPECIFIED (H): Primary | ICD-10-CM

## 2025-07-02 LAB
ALBUMIN SERPL ELPH-MCNC: 3.8 G/DL (ref 3.7–5.1)
ALPHA1 GLOB SERPL ELPH-MCNC: 0.3 G/DL (ref 0.2–0.4)
ALPHA2 GLOB SERPL ELPH-MCNC: 0.6 G/DL (ref 0.5–0.9)
B-GLOBULIN SERPL ELPH-MCNC: 0.5 G/DL (ref 0.6–1)
CRP SERPL-MCNC: <3 MG/L
GAMMA GLOB SERPL ELPH-MCNC: 1.1 G/DL (ref 0.7–1.6)
IGG SERPL-MCNC: 1255 MG/DL (ref 610–1616)
KAPPA LC FREE SER-MCNC: 76.75 MG/DL (ref 0.33–1.94)
KAPPA LC FREE/LAMBDA FREE SER NEPH: 403.95 {RATIO} (ref 0.26–1.65)
LAMBDA LC FREE SERPL-MCNC: 0.19 MG/DL (ref 0.57–2.63)
LOCATION OF TASK: ABNORMAL
M PROTEIN SERPL ELPH-MCNC: 1 G/DL
PROT PATTERN SERPL ELPH-IMP: ABNORMAL

## 2025-07-02 PROCEDURE — G0463 HOSPITAL OUTPT CLINIC VISIT: HCPCS | Performed by: INTERNAL MEDICINE

## 2025-07-02 NOTE — PROGRESS NOTES
Blood and Marrow Transplant - New Evaluation Appointment    Spoke with Quiana and patient's daughter, following visit with Dr. Trejo. I explained the role of the nurse coordinator throughout the process, as well as general time line and expectations for next steps. We discussed the necessity of a caregiver and the program's proximity requirements. All questions were answered.     Plan: Cellular Therapy, CAR-T vs, Kyprolis, pending Pt is going to discuss recommendations with RMD. Will contact BMT office if they decide to proceed with CAR-T. Would need repeat NT with labs, apheresis and social work. Pt is Frail.     Timeline Notes:Pt will follow up with RMD and discuss treatment options. Sounds like they are unlikely to purse CAR-T.     Contact information provided for :  yes      CAR-T:  Virologies drawn:  No- will draw at repeat NT if necessary.   If recommendation is made for neurotox prophylaxis, MD reminded to enter and sign orders for [id number] in supportive care treatment plan: N/A  Vein Assessment: N/A      BMT FRAILTY ASSESSMENT (55+)  BMT Fried Frailty        7/2/2025    12:35   Fried Frailty   Lost>10 pounds unintenionally last year N   Exhaustion Score 1   Slowness Score 1   Weakness/ Strength Score 1   Low Activity Level Score 1   Final Score Frail   Final Score Number 4   Sit Stand Assessment   Patient able to perform 5 chair stands N   Patient is able to perform stand with Feet Side by Side? Y   If cannot hold without support: 10   Patient is able to perform Semi-Tandem Stand? Y   If cannot hold without support: 10   Patient is able to perform Tandem Stand? Y   If cannot hold without support: 10      Pt is 55+, documented frail, internal: Yes  If YES, order Cancer Rehab Referral    Katherine Fernandez RN

## 2025-07-02 NOTE — LETTER
7/2/2025      Quiana Dunaway  [address] [address]      Dear Colleague,    Thank you for referring your patient, Quiana Dunaway, to the [facility] BLOOD AND MARROW [facility]ANSPLANT PROGRAM [place]. Please see a copy of my visit note below.    BMT/IEC Consultation       Quiana Dunaway is a 89 year old female with relapsed high risk multiple myeloma  referred by Dr. Alex Parry for CAR-T consultation. Myeloma baseline labs and  treatment history is summarized below.      Hematologic history:     Date Treatment Response Toxicities/Complications   08/14/2017 to 04/30/2018  VRD. Velcade stopped after 04/30/2018  Partial response ( 1.9-->0.3)     5/1/2018-12/27/2021  Rev/dex PD    12/27/2021 and 03/08/2022.  Intermittently on Rev/dex  PD    11/14/2022  Daratumumab/rev/dex MR    06/12/2023  DPD , Pomalidomide reduced to 1 mg a day for 2 weeks on, 2 week off in March 2025.  PD               Date of Diagnosis : 07/12/2017     Labs at diagnosis:   Hb -10.2 g/dL  Platelets: 153 k   Calcium -  8.9 mg/dL  Creatinine -0.6 mg/dL  Albumin -4.3 g/dL   LDH -188  U/L   Beta 2 microglobulin: 3.4 mg/L  SPEP/INDY:   M spike -1.9 g/dL, IgG kappa   Quantitative immunoglobulin: IgA -15 mg/dL, IgG--2,970 -mg/dL, IgM--175 mg/dL  Light chains: K- 67.75 mg/dL, L- 1.42 mg/dL,  K/L FLC 47.71 High    24 hr urine protein---  0.36 High  G/24 hrs       urine M protein  Bone marrow plasma cells : 66% kappa monotypic plasma cells.   Cytogenetics : 79-82,XXX,+2,+3,+4,+5,+7,add(8)(p11.2),+9,+9,+10,-13,-14,+15,nadine(16)t(14  ;16)(q32;q23),+17,+18,+19,-...   Myeloma FISH : There is gain of chromosome 1, 5, 9, 15, and 17.  translocation 14;16.   R-ISS : II  R2 ISS: II- low intermediate  ISS-III ( 1.5 points) , ISS-II ( 1 point), del 17 p ( 1 point) , high LDH ( 1 point) , t (4,14) -1 point, 1q plus ( 0.5 points)  I -Low risk ( 0 point ), II- low intermediate ( 0.5-1 points), III -intermediate to high risk ( 1.5-2.5  points), IV -high risk ( 3-5 points)  PET/CT skeletal survey: Scattered foci of increased FDG activity are present within the bony  structures. One focus of increased FDG activity is noted in the T1  vertebral body measuring an SUV max 5.3. Another area in the proximal  right humerus measures an SUV max 4.7. Fairly homogeneous increased  uptake is noted in the T8 vertebral body, measuring an SUV max 6.2 on  image 187. Right iliac bone lesion just above the level of the  acetabulum measures an SUV max 5.1     Baseline functional status:   Fatigue +   During the day time stays in bed for close to 16 hrs   Ambulates using a walker   Grand daughter helps with showering   Able to feed on her own.   Does her own banking   Can walk up to a block at baseline   [facility] and is able to [facility].   Had a fall 1 year back,.   No assistance with dressing.     IMWG : Score 3 , frail.     Physical Exam:     Vital Signs: LMP  (LMP Unknown)         ECOG PS 2     General Appearance: elderly female, frail appearing. Alert, awake, oriented X4 and answering questions appropriately.   Eyes: PERRL, pallor+   Ears/Nose/M/Throat: Oral mucosa and posterior oropharynx normal, moist mucous membranes  Neck supple, non-tender, free range of motion, no adenopathy  Cardio/Vascular:regular rate and rhythm, normal S1 and S2, no murmur  Resp Effort And Auscultation: Normal respiratory effort without distress  GI: soft, nontender, bowel sounds present in all four quadrants, no hepatosplenomegaly  Lymphatics:no significant enlargement of lymph nodes globally   Edema: pedal edema +  .  Neurologic: no focal deficits. Ambulates during a walker. .  Psych/Affect: Mood and affect are appropriate.            ROS:    10 point ROS neg other than the symptoms noted above in the HPI.        Past Medical History:   Diagnosis Date     Anxiety      HTN (hypertension)      Multiple myeloma not having achieved remission (H) 7/18/2017      Pacemaker     Placed due to symptomatic second degree AV Block     Pancytopenia (H) 6/28/2017     Paroxysmal atrial fibrillation (H)        Past Surgical History:   Procedure Laterality Date     ------------OTHER-------------      cataract eye surgery     BONE MARROW BIOPSY, BONE SPECIMEN, NEEDLE/TROCAR N/A 7/12/2017    Procedure: BIOPSY BONE MARROW;  BONE MARROW BIOPSY ;  Surgeon: Grace Vela MD;  Location:  GI     IMPLANT PACEMAKER  03/2017    AV block       Family History   Problem Relation Age of Onset     Unknown/Adopted Mother      Unknown/Adopted Father        Social History     Tobacco Use     Smoking status: Never     Smokeless tobacco: Never   Vaping Use     Vaping status: Never Used   Substance Use Topics     Alcohol use: No     Alcohol/week: 0.0 standard drinks of alcohol     Drug use: No          Allergies   Allergen Reactions     Levaquin [Levofloxacin] Other (See Comments)     Tingling in feet after 1 dose on 8/7/19          Current Outpatient Medications   Medication Sig Dispense Refill     acyclovir (ZOVIRAX) 400 MG tablet Take 1 tablet (400 mg) by mouth 2 times daily. Viral Prophylaxis. 180 tablet 3     aspirin 81 MG chewable tablet Take 81 mg by mouth daily        calcium carbonate (OS-HUMBERTO) 500 MG tablet Take 1 tablet (500 mg) by mouth daily. 90 tablet 1     cyanocobalamin (VITAMIN B-12) 1000 MCG tablet Take 1 tablet (1,000 mcg) by mouth daily. 90 tablet 0     Daratumumab (DARZALEX IV) Inject into the vein every 28 days. Q monthly injection, given on Mondays at cancer infusion center       dexAMETHasone (DECADRON) 4 MG tablet Take 1 tablet (4 mg) by mouth once a week 16 tablet 2     escitalopram (LEXAPRO) 5 MG tablet Take 1 tablet (5 mg) by mouth daily. 90 tablet 3     famotidine (PEPCID) 40 MG tablet Take 1 tablet (40 mg) by mouth 2 times daily. 180 tablet 3     furosemide (LASIX) 20 MG tablet Take once daily as needed for swelling or blood pressure greater than 170/100 90 tablet 3      guaiFENesin-dextromethorphan (ROBITUSSIN DM) 100-10 MG/5ML syrup Take 10 mLs by mouth every 4 hours as needed for cough. 236 mL 0     ipratropium - albuterol 0.5 mg/2.5 mg/3 mL (DUONEB) 0.5-2.5 (3) MG/3ML neb solution Take 1 vial (3 mLs) by nebulization every 4 hours as needed for shortness of breath, wheezing or cough. 90 mL 0     Loperamide HCl (IMODIUM A-D PO) Take 1 tablet by mouth as needed        LORazepam (ATIVAN) 0.5 MG tablet Take 1 tablet (0.5 mg) by mouth every 6 hours as needed for anxiety. 30 tablet 3     magic mouthwash suspension (diphenhydrAMINE, lidocaine, aluminum-magnesium & simethicone) Swish and swallow 10 mLs in mouth every 6 hours as needed for mouth sores. 180 mL 0     meclizine (ANTIVERT) 25 MG tablet Take 1 tablet (25 mg) by mouth every 6 hours as needed for dizziness. 360 tablet 3     methimazole (TAPAZOLE) 5 MG tablet Take 0.5 tablets (2.5 mg) by mouth daily. 45 tablet 3     mirtazapine (REMERON) 15 MG tablet Take 1 tablet (15 mg) by mouth at bedtime. 90 tablet 3     nitroglycerin (NITROSTAT) 0.4 MG sublingual tablet For chest pain place 1 tablet under the tongue every 5 minutes for 3 doses. If symptoms persist 5 minutes after 1st dose call 911. 25 tablet 0     ondansetron (ZOFRAN) 8 MG tablet Take 1 tablet (8 mg) by mouth every 8 hours as needed for nausea. 30 tablet 3     pomalidomide (POMALYST) 1 MG capsule Take 1 capsule (1 mg) by mouth daily for 14 days Swallow whole, do NOT break, crush, chew or open capsule. Take on days 1-14 of repeated 28 day cycles. 14 capsule 0     potassium chloride will ER (KLOR-CON M10) 10 MEQ CR tablet Take 1 tablet (10 mEq) by mouth daily. 90 tablet 0     prochlorperazine (COMPAZINE) 10 MG tablet Take 1 tablet (10 mg) by mouth every 6 hours as needed (Nausea/Vomiting). 30 tablet 6     Vitamin D3 (CHOLECALCIFEROL) 25 mcg (1000 units) tablet Take 1 tablet (25 mcg) by mouth daily. 90 tablet 1         Kevin Trejo MD      CBC RESULTS:   Recent Labs    Lab Test 07/01/25  0825 06/04/25  0830   WBC 5.8 5.9   HGB 10.5* 10.6*   * 119*       Last Comprehensive Metabolic Panel:  Sodium   Date Value Ref Range Status   07/01/2025 141 135 - 145 mmol/L Final   06/02/2021 141 133 - 144 mmol/L Final     Potassium   Date Value Ref Range Status   07/01/2025 4.0 3.4 - 5.3 mmol/L Final   01/16/2023 4.1 3.4 - 5.3 mmol/L Final   06/02/2021 3.7 3.4 - 5.3 mmol/L Final     Chloride   Date Value Ref Range Status   07/01/2025 107 98 - 107 mmol/L Final   01/16/2023 105 94 - 109 mmol/L Final   06/02/2021 108 94 - 109 mmol/L Final     Carbon Dioxide   Date Value Ref Range Status   06/02/2021 30 20 - 32 mmol/L Final     Carbon Dioxide (CO2)   Date Value Ref Range Status   07/01/2025 26 22 - 29 mmol/L Final   01/16/2023 26 20 - 32 mmol/L Final     Urea Nitrogen   Date Value Ref Range Status   07/01/2025 16.8 8.0 - 23.0 mg/dL Final   01/16/2023 28 7 - 30 mg/dL Final   06/02/2021 23 7 - 30 mg/dL Final     Creatinine   Date Value Ref Range Status   07/01/2025 0.70 0.51 - 0.95 mg/dL Final   06/02/2021 0.67 0.52 - 1.04 mg/dL Final     GFR Estimate   Date Value Ref Range Status   07/01/2025 83 >60 mL/min/1.73m2 Final     Comment:     eGFR calculated using 2021 CKD-EPI equation.   06/02/2021 80 >60 mL/min/[1.73_m2] Final     Comment:     Non  GFR Calc  Starting 12/18/2018, serum creatinine based estimated GFR (eGFR) will be   calculated using the Chronic Kidney Disease Epidemiology Collaboration   (CKD-EPI) equation.       Calcium   Date Value Ref Range Status   07/01/2025 9.0 8.8 - 10.4 mg/dL Final   06/02/2021 8.8 8.5 - 10.1 mg/dL Final       Liver Function Studies -   Recent Labs   Lab Test 07/01/25  0825   PROTTOTAL 6.6   ALBUMIN 3.8   BILITOTAL 0.5   ALKPHOS 64   AST 13   ALT 7       serum protein  electrophoresis    ------------------------------------------------------------------------------------------------------------------------------------------------      ASSESSMENT AND PLAN:  Ms. Quiana Dunaway is 89 year old yr old female with high risk relapsed myeloma referred by Dr Parry for CAR-T consultation.     1) Relapsed/refractory multiple myeloma: high risk disease , currently on DPD with evidence of progressive disease  2) Anxiety   3) HTN:   4) Paroxysmal A fib   5) Heart block: s/p PPM   6) Fraility : IMWG score-3 , frail       Ms. Quiana Dunaway presented to the clinic accompanied by her daughter. I did  discuss with the patient in detail about the  the process of CAR-T cell therapy including a pheresis, lymphodepleting chemotherapy, and the infusion of the cells. Patient  understands the benefits and potential risks of  CAR-T  which has an overall response around >90 % and complete response rate at ->80%. We also discussed the potential toxicities including cytokine release syndrome and ICANS. The severity of cytokine release syndrome/ICANS may vary from mild to moderate, and rarely can be potentially life-threatening needing ICU level of care. In addition, CAR T cell therapy can cause immune effector cell associated hyper inflammatory conditions such as MAS/HLH and rarely cause secondary hematological malignancies.   Rarely, patients undergoing CAR-T cell therapy may experience serious neurological event such as Guillain-Chesterfield syndrome, and a delayed-onset progressive movement disorder (ie, progressive parkinsonism).     Plan:     Given her advanced age and frailty, I don't think she is a good CAR-T candidate.       We also discussed non CAR-T approaches with selinexor ( dose reduced to 40 mg weekly)/Carfilzomib+ Dex  being a potential  next line therapy option. I would avoid using alkylator's anticipating use of T cell engager's beyond fourth line.     Once she is eligible for bi specifics ( >  4 lines of therapy, I would be in favor of using BCMA directed bipsecific antibody       Discussed with referring MD , Dr Parry    60 minutes spent on the date of the encounter doing chart review, interpretation of results, patient visit, documentation and coordination of care.        Quiana understood the above assessment and recommendations.  Multiple questions answered.  No barriers to learning identified.       Total time: 60 minutes  Counseling time: 20 minutes        Kevin Trejo MD  ,   Blood and Marrow Transplant and Cellular Therapy  Division of Hematology, Oncology and Transplantation      Patient Care Team         Relationship Specialty Notifications Start End    César Chung MD PCP - General Internal Medicine Yes. All results, Admissions 4/21/23     Phone: [phone] Pager: [phone] Fax: [phone] [address] [place] [address]    César Chung MD MD Internal Medicine  3/9/13     Phone: [phone] Pager: [phone] Fax: [phone] [address] [place] [address]    César Chung MD Assigned PCP   2/19/17     Phone: [phone] Pager: [phone] Fax: [phone] [address] [place] [address]    Marcel Marc MD MD Pulmonary Disease Admissions 6/21/21     Phone: [phone] Fax: [phone]         [address] [place] [address]    Alecia Briggs RN Specialty Care Coordinator Hematology & Oncology Admissions 11/4/22     Phone: [phone]         Bella Chowdhury, PharmD Pharmacist Pharmacist  12/19/22     Phone: [phone] Fax: [phone] [address] [place] [address]    César Wolfe MD MD Neurology  4/17/23     Phone: [phone] Fax: [phone]         [address] [place] [address]    Juaquin Hammond APRN CNP Nurse Practitioner Hematology & Oncology  4/17/23     Phone: [phone] Fax: [phone] [address] [facility]  [place] [address]    Imelda Benites PA-C Physician Assistant Cardiovascular Disease  3/11/25     Phone: [phone] Fax: [phone] [address] [place] [address]    Alex Parry MD Assigned Cancer Care Provider   6/23/25     Phone: [phone] Fax: [phone] [address] [place] [address]               Again, thank you for allowing me to participate in the care of your patient.        Sincerely,        Kevin Trejo MD    Electronically signed

## 2025-07-02 NOTE — NURSING NOTE
[facility] ROGERS Frailty assessment completed with patient in clinic. Patient had no questions and showed understanding of what assessment was being done. Paperwork given to provider.    Asher Nicole on 7/2/2025 at 1:47 PM

## 2025-07-02 NOTE — PROGRESS NOTES
BMT/IEC Consultation       Quiana Dunaway is a 89 year old female with relapsed high risk multiple myeloma  referred by Dr. Alex Parry for CAR-T consultation. Myeloma baseline labs and  treatment history is summarized below.      Hematologic history:     Date Treatment Response Toxicities/Complications   08/14/2017 to 04/30/2018  VRD. Velcade stopped after 04/30/2018  Partial response ( 1.9-->0.3)     5/1/2018-12/27/2021  Rev/dex PD    12/27/2021 and 03/08/2022.  Intermittently on Rev/dex  PD    11/14/2022  Daratumumab/rev/dex MR    06/12/2023  DPD , Pomalidomide reduced to 1 mg a day for 2 weeks on, 2 week off in March 2025.  PD               Date of Diagnosis : 07/12/2017     Labs at diagnosis:   Hb -10.2 g/dL  Platelets: 153 k   Calcium -  8.9 mg/dL  Creatinine -0.6 mg/dL  Albumin -4.3 g/dL   LDH -188  U/L   Beta 2 microglobulin: 3.4 mg/L  SPEP/INDY:   M spike -1.9 g/dL, IgG kappa   Quantitative immunoglobulin: IgA -15 mg/dL, IgG--2,970 -mg/dL, IgM--175 mg/dL  Light chains: K- 67.75 mg/dL, L- 1.42 mg/dL,  K/L FLC 47.71 High    24 hr urine protein---  0.36 High  G/24 hrs       urine M protein  Bone marrow plasma cells : 66% kappa monotypic plasma cells.   Cytogenetics : 79-82,XXX,+2,+3,+4,+5,+7,add(8)(p11.2),+9,+9,+10,-13,-14,+15,nadine(16)t(14  ;16)(q32;q23),+17,+18,+19,-...   Myeloma FISH : There is gain of chromosome 1, 5, 9, 15, and 17.  translocation 14;16.   R-ISS : II  R2 ISS: II- low intermediate  ISS-III ( 1.5 points) , ISS-II ( 1 point), del 17 p ( 1 point) , high LDH ( 1 point) , t (4,14) -1 point, 1q plus ( 0.5 points)  I -Low risk ( 0 point ), II- low intermediate ( 0.5-1 points), III -intermediate to high risk ( 1.5-2.5 points), IV -high risk ( 3-5 points)  PET/CT skeletal survey: Scattered foci of increased FDG activity are present within the bony  structures. One focus of increased FDG activity is noted in the T1  vertebral body measuring an SUV max 5.3. Another area in the proximal  right  humerus measures an SUV max 4.7. Fairly homogeneous increased  uptake is noted in the T8 vertebral body, measuring an SUV max 6.2 on  image 187. Right iliac bone lesion just above the level of the  acetabulum measures an SUV max 5.1     Baseline functional status:   Fatigue +   During the day time stays in bed for close to 16 hrs   Ambulates using a walker   Grand daughter helps with showering   Able to feed on her own.   Does her own banking   Can walk up to a block at baseline   [facility] a [facility] and is able to climb stairs.   Had a fall 1 year back,.   No assistance with dressing.     IMWG : Score 3 , frail.     Physical Exam:     Vital Signs: LMP  (LMP Unknown)         ECOG PS 2     General Appearance: elderly female, frail appearing. Alert, awake, oriented X4 and answering questions appropriately.   Eyes: PERRL, pallor+   Ears/Nose/M/Throat: Oral mucosa and posterior oropharynx normal, moist mucous membranes  Neck supple, non-tender, free range of motion, no adenopathy  Cardio/Vascular:regular rate and rhythm, normal S1 and S2, no murmur  Resp Effort And Auscultation: Normal respiratory effort without distress  GI: soft, nontender, bowel sounds present in all four quadrants, no hepatosplenomegaly  Lymphatics:no significant enlargement of lymph nodes globally   Edema: pedal edema +  .  Neurologic: no focal deficits. Ambulates during a walker. .  Psych/Affect: Mood and affect are appropriate.            ROS:    10 point ROS neg other than the symptoms noted above in the HPI.        Past Medical History:   Diagnosis Date    Anxiety     HTN (hypertension)     Multiple myeloma not having achieved remission (H) 7/18/2017    Pacemaker     Placed due to symptomatic second degree AV Block    Pancytopenia (H) 6/28/2017    Paroxysmal atrial fibrillation (H)        Past Surgical History:   Procedure Laterality Date    ------------OTHER-------------      cataract eye surgery    BONE MARROW BIOPSY, BONE  SPECIMEN, NEEDLE/TROCAR N/A 7/12/2017    Procedure: BIOPSY BONE MARROW;  BONE MARROW BIOPSY ;  Surgeon: Grace Vela MD;  Location:  GI    IMPLANT PACEMAKER  03/2017    AV block       Family History   Problem Relation Age of Onset    Unknown/Adopted Mother     Unknown/Adopted Father        Social History     Tobacco Use    Smoking status: Never    Smokeless tobacco: Never   Vaping Use    Vaping status: Never Used   Substance Use Topics    Alcohol use: No     Alcohol/week: 0.0 standard drinks of alcohol    Drug use: No          Allergies   Allergen Reactions    Levaquin [Levofloxacin] Other (See Comments)     Tingling in feet after 1 dose on 8/7/19          Current Outpatient Medications   Medication Sig Dispense Refill    acyclovir (ZOVIRAX) 400 MG tablet Take 1 tablet (400 mg) by mouth 2 times daily. Viral Prophylaxis. 180 tablet 3    aspirin 81 MG chewable tablet Take 81 mg by mouth daily       calcium carbonate (OS-HUMBERTO) 500 MG tablet Take 1 tablet (500 mg) by mouth daily. 90 tablet 1    cyanocobalamin (VITAMIN B-12) 1000 MCG tablet Take 1 tablet (1,000 mcg) by mouth daily. 90 tablet 0    Daratumumab (DARZALEX IV) Inject into the vein every 28 days. Q monthly injection, given on Mondays at cancer infusion center      dexAMETHasone (DECADRON) 4 MG tablet Take 1 tablet (4 mg) by mouth once a week 16 tablet 2    escitalopram (LEXAPRO) 5 MG tablet Take 1 tablet (5 mg) by mouth daily. 90 tablet 3    famotidine (PEPCID) 40 MG tablet Take 1 tablet (40 mg) by mouth 2 times daily. 180 tablet 3    furosemide (LASIX) 20 MG tablet Take once daily as needed for swelling or blood pressure greater than 170/100 90 tablet 3    guaiFENesin-dextromethorphan (ROBITUSSIN DM) 100-10 MG/5ML syrup Take 10 mLs by mouth every 4 hours as needed for cough. 236 mL 0    ipratropium - albuterol 0.5 mg/2.5 mg/3 mL (DUONEB) 0.5-2.5 (3) MG/3ML neb solution Take 1 vial (3 mLs) by nebulization every 4 hours as needed for shortness of  breath, wheezing or cough. 90 mL 0    Loperamide HCl (IMODIUM A-D PO) Take 1 tablet by mouth as needed       LORazepam (ATIVAN) 0.5 MG tablet Take 1 tablet (0.5 mg) by mouth every 6 hours as needed for anxiety. 30 tablet 3    magic mouthwash suspension (diphenhydrAMINE, lidocaine, aluminum-magnesium & simethicone) Swish and swallow 10 mLs in mouth every 6 hours as needed for mouth sores. 180 mL 0    meclizine (ANTIVERT) 25 MG tablet Take 1 tablet (25 mg) by mouth every 6 hours as needed for dizziness. 360 tablet 3    methimazole (TAPAZOLE) 5 MG tablet Take 0.5 tablets (2.5 mg) by mouth daily. 45 tablet 3    mirtazapine (REMERON) 15 MG tablet Take 1 tablet (15 mg) by mouth at bedtime. 90 tablet 3    nitroglycerin (NITROSTAT) 0.4 MG sublingual tablet For chest pain place 1 tablet under the tongue every 5 minutes for 3 doses. If symptoms persist 5 minutes after 1st dose call 911. 25 tablet 0    ondansetron (ZOFRAN) 8 MG tablet Take 1 tablet (8 mg) by mouth every 8 hours as needed for nausea. 30 tablet 3    pomalidomide (POMALYST) 1 MG capsule Take 1 capsule (1 mg) by mouth daily for 14 days Swallow whole, do NOT break, crush, chew or open capsule. Take on days 1-14 of repeated 28 day cycles. 14 capsule 0    potassium chloride will ER (KLOR-CON M10) 10 MEQ CR tablet Take 1 tablet (10 mEq) by mouth daily. 90 tablet 0    prochlorperazine (COMPAZINE) 10 MG tablet Take 1 tablet (10 mg) by mouth every 6 hours as needed (Nausea/Vomiting). 30 tablet 6    Vitamin D3 (CHOLECALCIFEROL) 25 mcg (1000 units) tablet Take 1 tablet (25 mcg) by mouth daily. 90 tablet 1         Kevin Trejo MD      CBC RESULTS:   Recent Labs   Lab Test 07/01/25  0825 06/04/25  0830   WBC 5.8 5.9   HGB 10.5* 10.6*   * 119*       Last Comprehensive Metabolic Panel:  Sodium   Date Value Ref Range Status   07/01/2025 141 135 - 145 mmol/L Final   06/02/2021 141 133 - 144 mmol/L Final     Potassium   Date Value Ref Range Status   07/01/2025 4.0 3.4 -  5.3 mmol/L Final   01/16/2023 4.1 3.4 - 5.3 mmol/L Final   06/02/2021 3.7 3.4 - 5.3 mmol/L Final     Chloride   Date Value Ref Range Status   07/01/2025 107 98 - 107 mmol/L Final   01/16/2023 105 94 - 109 mmol/L Final   06/02/2021 108 94 - 109 mmol/L Final     Carbon Dioxide   Date Value Ref Range Status   06/02/2021 30 20 - 32 mmol/L Final     Carbon Dioxide (CO2)   Date Value Ref Range Status   07/01/2025 26 22 - 29 mmol/L Final   01/16/2023 26 20 - 32 mmol/L Final     Urea Nitrogen   Date Value Ref Range Status   07/01/2025 16.8 8.0 - 23.0 mg/dL Final   01/16/2023 28 7 - 30 mg/dL Final   06/02/2021 23 7 - 30 mg/dL Final     Creatinine   Date Value Ref Range Status   07/01/2025 0.70 0.51 - 0.95 mg/dL Final   06/02/2021 0.67 0.52 - 1.04 mg/dL Final     GFR Estimate   Date Value Ref Range Status   07/01/2025 83 >60 mL/min/1.73m2 Final     Comment:     eGFR calculated using 2021 CKD-EPI equation.   06/02/2021 80 >60 mL/min/[1.73_m2] Final     Comment:     Non  GFR Calc  Starting 12/18/2018, serum creatinine based estimated GFR (eGFR) will be   calculated using the Chronic Kidney Disease Epidemiology Collaboration   (CKD-EPI) equation.       Calcium   Date Value Ref Range Status   07/01/2025 9.0 8.8 - 10.4 mg/dL Final   06/02/2021 8.8 8.5 - 10.1 mg/dL Final       Liver Function Studies -   Recent Labs   Lab Test 07/01/25  0825   PROTTOTAL 6.6   ALBUMIN 3.8   BILITOTAL 0.5   ALKPHOS 64   AST 13   ALT 7       serum protein electrophoresis    ------------------------------------------------------------------------------------------------------------------------------------------------      ASSESSMENT AND PLAN:  Ms. Quiana Dunaway is 89 year old yr old female with high risk relapsed myeloma referred by Dr Parry for CAR-T consultation.     1) Relapsed/refractory multiple myeloma: high risk disease , currently on DPD with evidence of progressive disease  2) Anxiety   3) HTN:   4) Paroxysmal A fib   5)  Heart block: s/p PPM   6) Fraility : IMWG score-3 , frail       Ms. Quiana Dunaway presented to the clinic accompanied by her daughter. I did  discuss with the patient in detail about the  the process of CAR-T cell therapy including a pheresis, lymphodepleting chemotherapy, and the infusion of the cells. Patient  understands the benefits and potential risks of  CAR-T  which has an overall response around >90 % and complete response rate at ->80%. We also discussed the potential toxicities including cytokine release syndrome and ICANS. The severity of cytokine release syndrome/ICANS may vary from mild to moderate, and rarely can be potentially life-threatening needing ICU level of care. In addition, CAR T cell therapy can cause immune effector cell associated hyper inflammatory conditions such as MAS/HLH and rarely cause secondary hematological malignancies.   Rarely, patients undergoing CAR-T cell therapy may experience serious neurological event such as Guillain-[place] syndrome, and a delayed-onset progressive movement disorder (ie, progressive parkinsonism).     Plan:     Given her advanced age and frailty, I don't think she is a good CAR-T candidate.       We also discussed non CAR-T approaches with selinexor ( dose reduced to 40 mg weekly)/Carfilzomib+ Dex  being a potential  next line therapy option. I would avoid using alkylator's anticipating use of T cell engager's beyond fourth line.     Once she is eligible for bi specifics ( > 4 lines of therapy, I would be in favor of using BCMA directed bipsecific antibody       Discussed with referring MD , Dr Parry    60 minutes spent on the date of the encounter doing chart review, interpretation of results, patient visit, documentation and coordination of care.        Quiana understood the above assessment and recommendations.  Multiple questions answered.  No barriers to learning identified.       Total time: 60 minutes  Counseling time: 20 minutes        Kevin  MD Maya  ,   [facility]  Division of Hematology, Oncology and Transplantation      Patient Care Team         Relationship Specialty Notifications Start End    César Chung MD PCP - General Internal Medicine Yes. All results, Admissions 4/21/23     Phone: [phone] Pager: [phone] Fax: [phone] [address] [place] [address]    César Chung MD MD Internal Medicine  3/9/13     Phone: [phone] Pager: [phone] Fax: [phone] [address] [place] [address]    César Chung MD Assigned PCP   2/19/17     Phone: [phone] Pager: [phone] Fax: [phone] [address] [place] [address]    Marcel Marc MD MD Pulmonary Disease Admissions 6/21/21     Phone: [phone] Fax: [phone]         [address] [place] [address]    Alecia Briggs, RN Specialty Care Coordinator Hematology & Oncology Admissions 11/4/22     Phone: [phone]         Bella Chowdhury, PharmD Pharmacist Pharmacist  12/19/22     Phone: [phone] Fax: [phone] [address] [place] [address]    César Wolfe MD MD Neurology  4/17/23     Phone: [phone] Fax: [phone]         [address] [place] [address]    Juaquin Hammond APRN CNP Nurse Practitioner Hematology & Oncology  4/17/23     Phone: [phone] Fax: [phone]         [address] [place] [address]    Imelda Benites PA-C Physician Assistant Cardiovascular Disease  3/11/25     Phone: [phone] Fax: [phone] [address] [place] [address]    Alex Parry MD Assigned Cancer Care Provider   6/23/25     Phone: [phone] Fax: [phone] [address] [place] [address]

## 2025-07-02 NOTE — NURSING NOTE
"Oncology Rooming Note    July 2, 2025 12:44 PM   Quiana Dunaway is a 89 year old female who presents for:    Chief Complaint   Patient presents with    Oncology Clinic Visit     Multiple myeloma not having achieved remission     Initial Vitals: LMP  (LMP Unknown)  Estimated body mass index is 25.32 kg/m  as calculated from the following:    Height as of 5/19/25: 1.549 m (5' 1\").    Weight as of 7/1/25: 60.8 kg (134 lb). There is no height or weight on file to calculate BSA.  Data Unavailable Comment: Data Unavailable   No LMP recorded (lmp unknown). Patient is postmenopausal.  Allergies reviewed: Yes  Medications reviewed: Yes    Medications: Medication refills not needed today.  Pharmacy name entered into [facility]:    [facility] - [place], [place] - [address]  [id number] - [place], [place] - [address] [facility] [address]    Frailty Screening:   Is the patient here for a new oncology consult visit in cancer care? 2. No    PHQ9:  Did this patient require a PHQ9?: No      Clinical concerns: none.       Asher Nicole              "

## 2025-07-07 ENCOUNTER — OFFICE VISIT (OUTPATIENT)
Dept: FAMILY MEDICINE | Facility: CLINIC | Age: 89
End: 2025-07-07
Payer: MEDICARE

## 2025-07-07 VITALS
RESPIRATION RATE: 16 BRPM | HEART RATE: 82 BPM | HEIGHT: 61 IN | OXYGEN SATURATION: 94 % | DIASTOLIC BLOOD PRESSURE: 72 MMHG | WEIGHT: 132 LBS | TEMPERATURE: 99.2 F | BODY MASS INDEX: 24.92 KG/M2 | SYSTOLIC BLOOD PRESSURE: 116 MMHG

## 2025-07-07 DIAGNOSIS — E05.90: ICD-10-CM

## 2025-07-07 DIAGNOSIS — F41.1 GAD (GENERALIZED ANXIETY DISORDER): ICD-10-CM

## 2025-07-07 DIAGNOSIS — I10 BENIGN ESSENTIAL HYPERTENSION: ICD-10-CM

## 2025-07-07 DIAGNOSIS — Z00.00 ENCOUNTER FOR MEDICARE ANNUAL WELLNESS EXAM: Primary | ICD-10-CM

## 2025-07-07 DIAGNOSIS — E05.20 TOXIC MULTINODUL GOITER: ICD-10-CM

## 2025-07-07 DIAGNOSIS — C90.00 MULTIPLE MYELOMA NOT HAVING ACHIEVED REMISSION (H): ICD-10-CM

## 2025-07-07 PROCEDURE — G0439 PPPS, SUBSEQ VISIT: HCPCS | Performed by: INTERNAL MEDICINE

## 2025-07-07 PROCEDURE — G2211 COMPLEX E/M VISIT ADD ON: HCPCS | Performed by: INTERNAL MEDICINE

## 2025-07-07 PROCEDURE — 99214 OFFICE O/P EST MOD 30 MIN: CPT | Mod: 25 | Performed by: INTERNAL MEDICINE

## 2025-07-07 PROCEDURE — 3078F DIAST BP <80 MM HG: CPT | Performed by: INTERNAL MEDICINE

## 2025-07-07 PROCEDURE — 1126F AMNT PAIN NOTED NONE PRSNT: CPT | Performed by: INTERNAL MEDICINE

## 2025-07-07 PROCEDURE — 3074F SYST BP LT 130 MM HG: CPT | Performed by: INTERNAL MEDICINE

## 2025-07-07 RX ORDER — ESCITALOPRAM OXALATE 10 MG/1
10 TABLET ORAL DAILY
Qty: 90 TABLET | Refills: 3 | Status: SHIPPED | OUTPATIENT
Start: 2025-07-07

## 2025-07-07 SDOH — HEALTH STABILITY: PHYSICAL HEALTH: ON AVERAGE, HOW MANY DAYS PER WEEK DO YOU ENGAGE IN MODERATE TO STRENUOUS EXERCISE (LIKE A BRISK WALK)?: 0 DAYS

## 2025-07-07 ASSESSMENT — PAIN SCALES - GENERAL: PAINLEVEL_OUTOF10: NO PAIN (0)

## 2025-07-07 ASSESSMENT — SOCIAL DETERMINANTS OF HEALTH (SDOH): HOW OFTEN DO YOU GET TOGETHER WITH FRIENDS OR RELATIVES?: MORE THAN THREE TIMES A WEEK

## 2025-07-07 NOTE — PATIENT INSTRUCTIONS
Patient Education   Preventive Care Advice   This is general advice given by our system to help you stay healthy. However, your care team may have specific advice just for you. Please talk to your care team about your preventive care needs.  Nutrition  Eat 5 or more servings of fruits and vegetables each day.  Try wheat bread, brown rice and whole grain pasta (instead of white bread, rice, and pasta).  Get enough calcium and vitamin D. Check the label on foods and aim for 100% of the RDA (recommended daily allowance).  Lifestyle  Exercise at least 150 minutes each week  (30 minutes a day, 5 days a week).  Do muscle strengthening activities 2 days a week. These help control your weight and prevent disease.  No smoking.  Wear sunscreen to prevent skin cancer.  Have a dental exam and cleaning every 6 months.  Yearly exams  See your health care team every year to talk about:  Any changes in your health.  Any medicines your care team has prescribed.  Preventive care, family planning, and ways to prevent chronic diseases.  Shots (vaccines)   HPV shots (up to age 26), if you've never had them before.  Hepatitis B shots (up to age 59), if you've never had them before.  COVID-19 shot: Get this shot when it's due.  Flu shot: Get a flu shot every year.  Tetanus shot: Get a tetanus shot every 10 years.  Pneumococcal, hepatitis A, and RSV shots: Ask your care team if you need these based on your risk.  Shingles shot (for age 50 and up)  General health tests  Diabetes screening:  Starting at age 35, Get screened for diabetes at least every 3 years.  If you are younger than age 35, ask your care team if you should be screened for diabetes.  Cholesterol test: At age 39, start having a cholesterol test every 5 years, or more often if advised.  Bone density scan (DEXA): At age 50, ask your care team if you should have this scan for osteoporosis (brittle bones).  Hepatitis C: Get tested at least once in your life.  STIs (sexually  transmitted infections)  Before age 24: Ask your care team if you should be screened for STIs.  After age 24: Get screened for STIs if you're at risk. You are at risk for STIs (including HIV) if:  You are sexually active with more than one person.  You don't use condoms every time.  You or a partner was diagnosed with a sexually transmitted infection.  If you are at risk for HIV, ask about PrEP medicine to prevent HIV.  Get tested for HIV at least once in your life, whether you are at risk for HIV or not.  Cancer screening tests  Cervical cancer screening: If you have a cervix, begin getting regular cervical cancer screening tests starting at age 21.  Breast cancer scan (mammogram): If you've ever had breasts, begin having regular mammograms starting at age 40. This is a scan to check for breast cancer.  Colon cancer screening: It is important to start screening for colon cancer at age 45.  Have a colonoscopy test every 10 years (or more often if you're at risk) Or, ask your provider about stool tests like a FIT test every year or Cologuard test every 3 years.  To learn more about your testing options, visit:   .  For help making a decision, visit:   https://bit.ly/dv31302.  Prostate cancer screening test: If you have a prostate, ask your care team if a prostate cancer screening test (PSA) at age 55 is right for you.  Lung cancer screening: If you are a current or former smoker ages 50 to 80, ask your care team if ongoing lung cancer screenings are right for you.  For informational purposes only. Not to replace the advice of your health care provider. Copyright   2023 [facility]. All rights reserved. Clinically reviewed by the [facility] Transitions Program. [employer] 280094 - REV 01/24.  Learning About Stress  What is stress?     Stress is your body's response to a hard situation. Your body can have a physical, emotional, or mental response. Stress is a fact of life for most people, and it  affects everyone differently. What causes stress for you may not be stressful for someone else.  A lot of things can cause stress. You may feel stress when you go on a job interview, take a test, or run a race. This kind of short-term stress is normal and even useful. It can help you if you need to work hard or react quickly. For example, stress can help you finish an important job on time.  Long-term stress is caused by ongoing stressful situations or events. Examples of long-term stress include long-term health problems, ongoing problems at work, or conflicts in your family. Long-term stress can harm your health.  How does stress affect your health?  When you are stressed, your body responds as though you are in danger. It makes hormones that speed up your heart, make you breathe faster, and give you a burst of energy. This is called the fight-or-flight stress response. If the stress is over quickly, your body goes back to normal and no harm is done.  But if stress happens too often or lasts too long, it can have bad effects. Long-term stress can make you more likely to get sick, and it can make symptoms of some diseases worse. If you tense up when you are stressed, you may develop neck, shoulder, or low back pain. Stress is linked to high blood pressure and heart disease.  Stress also harms your emotional health. It can make you brian, tense, or depressed. Your relationships may suffer, and you may not do well at work or school.  What can you do to manage stress?  You can try these things to help manage stress:   Do something active. Exercise or activity can help reduce stress. Walking is a great way to get started. Even everyday activities such as housecleaning or yard work can help.  Try yoga or nhung chi. These techniques combine exercise and meditation. You may need some training at first to learn them.  Do something you enjoy. For example, listen to music or go to a movie. Practice your hobby or do volunteer  "work.  Meditate. This can help you relax, because you are not worrying about what happened before or what may happen in the future.  Do guided imagery. Imagine yourself in any setting that helps you feel calm. You can use online videos, books, or a teacher to guide you.  Do breathing exercises. For example:  From a standing position, bend forward from the waist with your knees slightly bent. Let your arms dangle close to the floor.  Breathe in slowly and deeply as you return to a standing position. Roll up slowly and lift your head last.  Hold your breath for just a few seconds in the standing position.  Breathe out slowly and bend forward from the waist.  Let your feelings out. Talk, laugh, cry, and express anger when you need to. Talking with supportive friends or family, a counselor, or a kurt leader about your feelings is a healthy way to relieve stress. Avoid discussing your feelings with people who make you feel worse.  Write. It may help to write about things that are bothering you. This helps you find out how much stress you feel and what is causing it. When you know this, you can find better ways to cope.  What can you do to prevent stress?  You might try some of these things to help prevent stress:  Manage your time. This helps you find time to do the things you want and need to do.  Get enough sleep. Your body recovers from the stresses of the day while you are sleeping.  Get support. Your family, friends, and community can make a difference in how you experience stress.  Limit your news feed. Avoid or limit time on social media or news that may make you feel stressed.  Do something active. Exercise or activity can help reduce stress. Walking is a great way to get started.  Where can you learn more?  Go to https://www.[employer].net/patiented  Enter N032 in the search box to learn more about \"Learning About Stress.\"  Current as of: October 24, 2024  Content Version: 14.5 2024-2025 [employer]   Care instructions adapted under license by your healthcare professional. If you have questions about a medical condition or this instruction, always ask your healthcare professional. [employer] disclaims any warranty or liability for your use of this information.    Bladder Training: Care Instructions  Your Care Instructions     Bladder training is used to treat urge incontinence and stress incontinence. Urge incontinence means that the need to urinate comes on so fast that you can't get to a toilet in time. Stress incontinence means that you leak urine because of pressure on your bladder. For example, it may happen when you laugh, cough, or lift something heavy.  Bladder training can increase how long you can wait before you have to urinate. It can also help your bladder hold more urine. And it can give you better control over the urge to urinate.  It is important to remember that bladder training takes a few weeks to a few months to make a difference. You may not see results right away, but don't give up.  Follow-up care is a key part of your treatment and safety. Be sure to make and go to all appointments, and call your doctor if you are having problems. It's also a good idea to know your test results and keep a list of the medicines you take.  How can you care for yourself at home?  Work with your doctor to come up with a bladder training program that is right for you. You may use one or more of the following methods.  Delayed urination  In the beginning, try to keep from urinating for 5 minutes after you first feel the need to go.  While you wait, take deep, slow breaths to relax. Kegel exercises can also help you delay the need to go to the bathroom.  After some practice, when you can easily wait 5 minutes to urinate, try to wait 10 minutes before you urinate.  Slowly increase the waiting period until you are able to control when you have to urinate.  Scheduled urination  Empty your bladder  "when you first wake up in the morning.  Schedule times throughout the day when you will urinate.  Start by going to the bathroom every hour, even if you don't need to go.  Slowly increase the time between trips to the bathroom.  When you have found a schedule that works well for you, keep doing it.  If you wake up during the night and have to urinate, do it. Apply your schedule to waking hours only.  Kegel exercises  These tighten and strengthen pelvic muscles, which can help you control the flow of urine. (If doing these exercises causes pain, stop doing them and talk with your doctor.) To do Kegel exercises:  Squeeze your muscles as if you were trying not to pass gas. Or squeeze your muscles as if you were stopping the flow of urine. Your belly, legs, and buttocks shouldn't move.  Hold the squeeze for 3 seconds, then relax for 5 to 10 seconds.  Start with 3 seconds, then add 1 second each week until you are able to squeeze for 10 seconds.  Repeat the exercise 10 times a session. Do 3 to 8 sessions a day.  When should you call for help?  Watch closely for changes in your health, and be sure to contact your doctor if:    Your incontinence is getting worse.     You do not get better as expected.   Where can you learn more?  Go to https://www.[employer].net/patiented  Enter V684 in the search box to learn more about \"Bladder Training: Care Instructions.\"  Current as of: April 30, 2024  Content Version: 14.5 2024-2025 [employer].   Care instructions adapted under license by your healthcare professional. If you have questions about a medical condition or this instruction, always ask your healthcare professional. [employer] disclaims any warranty or liability for your use of this information.       "

## 2025-07-07 NOTE — PROGRESS NOTES
"  {PROVIDER CHARTING PREFERENCE:077344}    Dirk Araya is a 89 year old, presenting for the following health issues:  Physical      7/7/2025    12:49 PM   Additional Questions   Roomed by Haydee   Accompanied by Regi, daughter in law     Healthy Habits:     Taking medications regularly:  0  History of Present Illness       Hypertension: She presents for follow up of hypertension.  She does not check blood pressure  regularly outside of the clinic. Outside blood pressures have been over 140/90. She does not follow a low salt diet. She exercises with enough effort to increase her heart rate 9 or less minutes per day.  She exercises with enough effort to increase her heart rate 3 or less days per week.   She is taking medications regularly.        {MA/LPN/RN Pre-Provider Visit Orders- hCG/UA/Strep (Optional):891321}  {ACUTE SUPERLIST - extended history:461345}  {additonal problems for provider to add (Optional):210134}    {ROS Picklists (Optional):776474}      Objective    /72 (BP Location: Left arm, Patient Position: Sitting, Cuff Size: Adult Regular)   Pulse 82   Temp 99.2  F (37.3  C) (Tympanic)   Resp 16   Ht 1.549 m (5' 1\")   Wt 59.9 kg (132 lb)   LMP  (LMP Unknown)   SpO2 94%   BMI 24.94 kg/m    Body mass index is 24.94 kg/m .  Physical Exam   {Exam List (Optional):000784}    {Diagnostic Test Results (Optional):786689}        Signed Electronically by: César Chung MD  {Email feedback regarding this note to [email or web address]   :[id number]}  "

## 2025-07-07 NOTE — PROGRESS NOTES
Assessment & Plan     Encounter for Medicare annual wellness exam      Multiple myeloma not having achieved remission (H)  Unfortunately current treatments are not working  She was referred to [facility] for consultation  Additional options for treatment were presented  She will discussion options with her primary oncologist soon     ROBER (generalized anxiety disorder)  Family concerned about anxiety levels  Decided to increase lexapro from 5 to 10 mg daily; follow up in 4 weeks to assess therapy   - escitalopram (LEXAPRO) 10 MG tablet; Take 1 tablet (10 mg) by mouth daily.    Benign essential hypertension  Under good control     Apathetic thyrotoxicosis  Recheck TFTs with next lab draw   - TSH with free T4 reflex; Future    Toxic multinodul goiter      Patient has been advised of split billing requirements and indicates understanding: Yes      Reviewed preventive health counseling, as reflected in patient instructions  Counseling  Appropriate preventive services were addressed with this patient via screening, questionnaire, or discussion as appropriate for fall prevention, nutrition, physical activity, Tobacco-use cessation, social engagement, weight loss and cognition.  Checklist reviewing preventive services available has been given to the patient.  Reviewed patient's diet, addressing concerns and/or questions.   The patient was instructed to see the dentist every 6 months.   She is at risk for psychosocial distress and has been provided with information to reduce risk.   Information on urinary incontinence and treatment options given to patient.         Follow-up  Return in about 1 month (around 8/7/2025) for recheck.  Patient instructed to return to clinic or contact us sooner if symptoms worsen or new symptoms develop.     Dirk Araya is a 89 year old, presenting for the following health issues:  Physical and Follow Up        7/7/2025    12:49 PM   Additional Questions   Roomed by Haydee   Accompanied by  Regi, daughter in law     History of Present Illness       Hypertension: She presents for follow up of hypertension.  She does not check blood pressure  regularly outside of the clinic. Outside blood pressures have been over 140/90. She does not follow a low salt diet. She exercises with enough effort to increase her heart rate 9 or less minutes per day.  She exercises with enough effort to increase her heart rate 3 or less days per week.   She is taking medications regularly.        Annual Wellness Visit       Wt Readings from Last 4 Encounters:   07/07/25 59.9 kg (132 lb)   07/01/25 60.8 kg (134 lb)   06/04/25 61.7 kg (136 lb)   05/19/25 44.8 kg (98 lb 11.2 oz)       Patient has been advised of split billing requirements and indicates understanding: Yes      Health Care Directive  Patient does not have a Health Care Directive: Discussed advance care planning with patient; information given to patient to review.      7/7/2025   General Health   How would you rate your overall physical health? (!) DECLINE   Feel stress (tense, anxious, or unable to sleep) To some extent          7/7/2025   Nutrition   Diet: Regular (no restrictions)         7/7/2025   Exercise   Days per week of moderate/strenous exercise 0 days     (!) EXERCISE CONCERN      7/7/2025   Social Factors   Frequency of gathering with friends or relatives More than three times a week   Worry food won't last until get money to buy more No   Food not last or not have enough money for food? No   Do you have housing? (Housing is defined as stable permanent housing and does not include staying outside in a car, in a tent, in an abandoned building, in an overnight shelter, or couch-surfing.) Yes   Are you worried about losing your housing? No   Lack of transportation? No   Unable to get utilities (heat,electricity)? Yes   Want help with housing or utility concern? No   (!) FINANCIAL RESOURCE STRAIN CONCERN        7/7/2025   Fall Risk   Fallen 2 or more times  in the past year? No    No    No   Trouble with walking or balance? No    No    No       Multiple values from one day are sorted in reverse-chronological order          7/7/2025   Activities of Daily Living- Home Safety   Needs help with the following daily activites None of the above   Safety concerns in the home None of the above         7/7/2025   Dental   Dentist two times every year? (!) NO         7/7/2025   Hearing Screening   Hearing concerns? None of the above         7/7/2025   Driving Risk Screening   Patient/family members have concerns about driving No         7/7/2025   General Alertness/Fatigue Screening   Have you been more tired than usual lately? No         7/7/2025   Urinary Incontinence Screening   Bothered by leaking urine in past 6 months Yes          No data to display                  7/7/2025   Substance Use   Alcohol more than 3/day or more than 7/wk Not Applicable   Do you have a current opioid prescription? No   How severe/bad is pain from 1 to 10? 0/10 (No Pain)   Do you use any other substances recreationally? No     Social History     Tobacco Use    Smoking status: Never    Smokeless tobacco: Never   Vaping Use    Vaping status: Never Used   Substance Use Topics    Alcohol use: No     Alcohol/week: 0.0 standard drinks of alcohol    Drug use: No         Today's PHQ-2 Score:       7/7/2025     1:10 PM   PHQ-2 ( 1999 [employer])   Q1: Little interest or pleasure in doing things 0   Q2: Feeling down, depressed or hopeless 0   PHQ-2 Score 0                      Reviewed and updated as needed this visit by Provider                    Past Medical History:   Diagnosis Date    Anxiety     HTN (hypertension)     Multiple myeloma not having achieved remission (H) 7/18/2017    Pacemaker     Placed due to symptomatic second degree AV Block    Pancytopenia (H) 6/28/2017    Paroxysmal atrial fibrillation (H)      Past Surgical History:   Procedure Laterality Date    ------------OTHER-------------       cataract eye surgery    BONE MARROW BIOPSY, BONE SPECIMEN, NEEDLE/TROCAR N/A 7/12/2017    Procedure: BIOPSY BONE MARROW;  BONE MARROW BIOPSY ;  Surgeon: Grace Vela MD;  Location:  GI    IMPLANT PACEMAKER  03/2017    AV block     BP Readings from Last 3 Encounters:   07/07/25 116/72   07/01/25 (!) 165/93   06/04/25 (!) 159/77    Wt Readings from Last 3 Encounters:   07/07/25 59.9 kg (132 lb)   07/01/25 60.8 kg (134 lb)   06/04/25 61.7 kg (136 lb)                  Patient Active Problem List   Diagnosis    Benign essential hypertension    Multiple myeloma not having achieved remission (H)    Pacemaker    ROBER (generalized anxiety disorder)    Sick sinus syndrome (H)    Overactive bladder    Neutropenia, unspecified type    Osteopenia    Vitamin B12 deficiency (non anemic)    Apathetic thyrotoxicosis    Toxic multinodul goiter    Vertigo    Chemotherapy-induced neutropenia     Past Surgical History:   Procedure Laterality Date    ------------OTHER-------------      cataract eye surgery    BONE MARROW BIOPSY, BONE SPECIMEN, NEEDLE/TROCAR N/A 7/12/2017    Procedure: BIOPSY BONE MARROW;  BONE MARROW BIOPSY ;  Surgeon: Grace Vela MD;  Location:  GI    IMPLANT PACEMAKER  03/2017    AV block       Social History     Tobacco Use    Smoking status: Never    Smokeless tobacco: Never   Substance Use Topics    Alcohol use: No     Alcohol/week: 0.0 standard drinks of alcohol     Family History   Problem Relation Age of Onset    Unknown/Adopted Mother     Unknown/Adopted Father          Current Outpatient Medications   Medication Sig Dispense Refill    acyclovir (ZOVIRAX) 400 MG tablet Take 1 tablet (400 mg) by mouth 2 times daily. Viral Prophylaxis. 180 tablet 3    aspirin 81 MG chewable tablet Take 81 mg by mouth daily       calcium carbonate (OS-HUMBERTO) 500 MG tablet Take 1 tablet (500 mg) by mouth daily. 90 tablet 1    cyanocobalamin (VITAMIN B-12) 1000 MCG tablet Take 1 tablet (1,000 mcg) by mouth  daily. 90 tablet 0    Daratumumab (DARZALEX IV) Inject into the vein every 28 days. Q monthly injection, given on Mondays at cancer infusion center      dexAMETHasone (DECADRON) 4 MG tablet Take 1 tablet (4 mg) by mouth once a week 16 tablet 2    escitalopram (LEXAPRO) 10 MG tablet Take 1 tablet (10 mg) by mouth daily. 90 tablet 3    famotidine (PEPCID) 40 MG tablet Take 1 tablet (40 mg) by mouth 2 times daily. 180 tablet 3    furosemide (LASIX) 20 MG tablet Take once daily as needed for swelling or blood pressure greater than 170/100 90 tablet 3    guaiFENesin-dextromethorphan (ROBITUSSIN DM) 100-10 MG/5ML syrup Take 10 mLs by mouth every 4 hours as needed for cough. 236 mL 0    ipratropium - albuterol 0.5 mg/2.5 mg/3 mL (DUONEB) 0.5-2.5 (3) MG/3ML neb solution Take 1 vial (3 mLs) by nebulization every 4 hours as needed for shortness of breath, wheezing or cough. 90 mL 0    Loperamide HCl (IMODIUM A-D PO) Take 1 tablet by mouth as needed       LORazepam (ATIVAN) 0.5 MG tablet Take 1 tablet (0.5 mg) by mouth every 6 hours as needed for anxiety. 30 tablet 3    magic mouthwash suspension (diphenhydrAMINE, lidocaine, aluminum-magnesium & simethicone) Swish and swallow 10 mLs in mouth every 6 hours as needed for mouth sores. 180 mL 0    meclizine (ANTIVERT) 25 MG tablet Take 1 tablet (25 mg) by mouth every 6 hours as needed for dizziness. 360 tablet 3    methimazole (TAPAZOLE) 5 MG tablet Take 0.5 tablets (2.5 mg) by mouth daily. 45 tablet 3    mirtazapine (REMERON) 15 MG tablet Take 1 tablet (15 mg) by mouth at bedtime. 90 tablet 3    nitroglycerin (NITROSTAT) 0.4 MG sublingual tablet For chest pain place 1 tablet under the tongue every 5 minutes for 3 doses. If symptoms persist 5 minutes after 1st dose call 911. 25 tablet 0    ondansetron (ZOFRAN) 8 MG tablet Take 1 tablet (8 mg) by mouth every 8 hours as needed for nausea. 30 tablet 3    pomalidomide (POMALYST) 1 MG capsule Take 1 capsule (1 mg) by mouth daily for 14  days Swallow whole, do NOT break, crush, chew or open capsule. Take on days 1-14 of repeated 28 day cycles. 14 capsule 0    potassium chloride will ER (KLOR-CON M10) 10 MEQ CR tablet Take 1 tablet (10 mEq) by mouth daily. 90 tablet 0    prochlorperazine (COMPAZINE) 10 MG tablet Take 1 tablet (10 mg) by mouth every 6 hours as needed (Nausea/Vomiting). 30 tablet 6    Vitamin D3 (CHOLECALCIFEROL) 25 mcg (1000 units) tablet Take 1 tablet (25 mcg) by mouth daily. 90 tablet 1       Current providers sharing in care for this patient include:  Patient Care Team:  César Chung MD as PCP - General (Internal Medicine)  César Chung MD as MD (Internal Medicine)  César Chung MD as Assigned PCP  Marcel Marc MD as MD (Pulmonary Disease)  Alecia Briggs, RN as Specialty Care Coordinator (Hematology & Oncology)  Bella Chowdhury, PharmD as Pharmacist (Pharmacist)  César Wolfe MD as MD (Neurology)  Juaquin Hammond APRN CNP as Nurse Practitioner (Hematology & Oncology)  Imelda Benites PA-C as Physician Assistant (Cardiovascular Disease)  Alex Parry MD as Assigned Cancer Care Provider    The following health maintenance items are reviewed in Epic and correct as of today:  Health Maintenance   Topic Date Due    DEXA  Never done    HF ACTION PLAN  06/01/2024    COVID-19 VACCINE (9 - 2024-25 season) 06/10/2025    INFLUENZA VACCINE (1) 09/01/2025    BMP  01/01/2026    ANNUAL REVIEW OF HM ORDERS  03/03/2026    ALT  07/01/2026    CBC  07/01/2026    MEDICARE ANNUAL WELLNESS VISIT  07/07/2026    FALL RISK ASSESSMENT  07/07/2026    ADVANCE CARE PLANNING  09/28/2028    DTAP/TDAP/TD VACCINE (3 - Td or Tdap) 06/01/2031    TSH W/FREE T4 REFLEX  Completed    PHQ-2 (once per calendar year)  Completed    PNEUMOCOCCAL VACCINE 50+ YEARS  Completed    ZOSTER VACCINE  Completed    RSV VACCINE  Completed    HPV VACCINE  Aged Out    MENINGITIS VACCINE  Aged Out    LIPID  Discontinued    MAMMO SCREENING  Discontinued  "      Appropriate preventive services were discussed with this patient, including applicable screening as appropriate for fall prevention, nutrition, physical activity, Tobacco-use cessation, weight loss and cognition.  Checklist reviewing preventive services available has been given to the patient.           7/7/2025   Mini Cog   Clock Draw Score 0 Abnormal    0 Abnormal   3 Item Recall 2 objects recalled    2 objects recalled   Mini Cog Total Score 2    2       Multiple values from one day are sorted in reverse-chronological order                      Objective    /72 (BP Location: Left arm, Patient Position: Sitting, Cuff Size: Adult Regular)   Pulse 82   Temp 99.2  F (37.3  C) (Tympanic)   Resp 16   Ht 1.549 m (5' 1\")   Wt 59.9 kg (132 lb)   LMP  (LMP Unknown)   SpO2 94%   BMI 24.94 kg/m    Body mass index is 24.94 kg/m .  Physical Exam   GENERAL: alert and no distress  EYES: Eyes grossly normal to inspection, PERRL and conjunctivae and sclerae normal  HENT: ear canals and TM's normal, nose and mouth without ulcers or lesions  NECK: no adenopathy, no asymmetry, masses, or scars  RESP: lungs clear to auscultation - no rales, rhonchi or wheezes  CV: Heart with regular rate and rhythm.   ABDOMEN: soft, nontender, no hepatosplenomegaly, no masses and bowel sounds normal  MS: no gross musculoskeletal defects noted, no edema  NEURO: Normal strength and tone, mentation intact and speech normal  PSYCH: mentation appears normal, affect mildly anxious             Signed Electronically by: César Chung MD    "

## 2025-07-08 ENCOUNTER — VIRTUAL VISIT (OUTPATIENT)
Dept: ONCOLOGY | Facility: CLINIC | Age: 89
End: 2025-07-08
Attending: INTERNAL MEDICINE
Payer: MEDICARE

## 2025-07-08 DIAGNOSIS — M62.81 GENERALIZED MUSCLE WEAKNESS: ICD-10-CM

## 2025-07-08 DIAGNOSIS — C90.00 MULTIPLE MYELOMA NOT HAVING ACHIEVED REMISSION (H): Primary | ICD-10-CM

## 2025-07-08 PROCEDURE — 1126F AMNT PAIN NOTED NONE PRSNT: CPT | Performed by: INTERNAL MEDICINE

## 2025-07-08 PROCEDURE — 98006 SYNCH AUDIO-VIDEO EST MOD 30: CPT | Performed by: INTERNAL MEDICINE

## 2025-07-08 RX ORDER — ALBUTEROL SULFATE 0.83 MG/ML
2.5 SOLUTION RESPIRATORY (INHALATION)
OUTPATIENT
Start: 2025-08-12

## 2025-07-08 RX ORDER — MEPERIDINE HYDROCHLORIDE 25 MG/ML
25 INJECTION INTRAMUSCULAR; INTRAVENOUS; SUBCUTANEOUS
OUTPATIENT
Start: 2025-08-05

## 2025-07-08 RX ORDER — HEPARIN SODIUM,PORCINE 10 UNIT/ML
5-20 VIAL (ML) INTRAVENOUS DAILY PRN
OUTPATIENT
Start: 2025-08-12

## 2025-07-08 RX ORDER — ALBUTEROL SULFATE 0.83 MG/ML
2.5 SOLUTION RESPIRATORY (INHALATION)
OUTPATIENT
Start: 2025-08-05

## 2025-07-08 RX ORDER — DIPHENHYDRAMINE HYDROCHLORIDE 50 MG/ML
50 INJECTION, SOLUTION INTRAMUSCULAR; INTRAVENOUS
Start: 2025-08-12

## 2025-07-08 RX ORDER — ALBUTEROL SULFATE 90 UG/1
1-2 INHALANT RESPIRATORY (INHALATION)
Start: 2025-07-29

## 2025-07-08 RX ORDER — METHYLPREDNISOLONE SODIUM SUCCINATE 40 MG/ML
40 INJECTION INTRAMUSCULAR; INTRAVENOUS
Start: 2025-08-05

## 2025-07-08 RX ORDER — HEPARIN SODIUM,PORCINE 10 UNIT/ML
5-20 VIAL (ML) INTRAVENOUS DAILY PRN
OUTPATIENT
Start: 2025-08-05

## 2025-07-08 RX ORDER — DIPHENHYDRAMINE HYDROCHLORIDE 50 MG/ML
50 INJECTION, SOLUTION INTRAMUSCULAR; INTRAVENOUS
Start: 2025-07-29

## 2025-07-08 RX ORDER — HEPARIN SODIUM,PORCINE 10 UNIT/ML
5-20 VIAL (ML) INTRAVENOUS DAILY PRN
OUTPATIENT
Start: 2025-07-29

## 2025-07-08 RX ORDER — HEPARIN SODIUM (PORCINE) LOCK FLUSH IV SOLN 100 UNIT/ML 100 UNIT/ML
5 SOLUTION INTRAVENOUS
OUTPATIENT
Start: 2025-08-12

## 2025-07-08 RX ORDER — HEPARIN SODIUM (PORCINE) LOCK FLUSH IV SOLN 100 UNIT/ML 100 UNIT/ML
5 SOLUTION INTRAVENOUS
OUTPATIENT
Start: 2025-08-05

## 2025-07-08 RX ORDER — MEPERIDINE HYDROCHLORIDE 25 MG/ML
25 INJECTION INTRAMUSCULAR; INTRAVENOUS; SUBCUTANEOUS
OUTPATIENT
Start: 2025-08-12

## 2025-07-08 RX ORDER — DIPHENHYDRAMINE HYDROCHLORIDE 50 MG/ML
50 INJECTION, SOLUTION INTRAMUSCULAR; INTRAVENOUS
Start: 2025-08-05

## 2025-07-08 RX ORDER — LORAZEPAM 2 MG/ML
0.5 INJECTION INTRAMUSCULAR EVERY 4 HOURS PRN
OUTPATIENT
Start: 2025-08-05

## 2025-07-08 RX ORDER — ALBUTEROL SULFATE 90 UG/1
1-2 INHALANT RESPIRATORY (INHALATION)
Start: 2025-08-12

## 2025-07-08 RX ORDER — EPINEPHRINE 1 MG/ML
0.3 INJECTION, SOLUTION INTRAMUSCULAR; SUBCUTANEOUS EVERY 5 MIN PRN
OUTPATIENT
Start: 2025-08-05

## 2025-07-08 RX ORDER — DIPHENHYDRAMINE HYDROCHLORIDE 50 MG/ML
25 INJECTION, SOLUTION INTRAMUSCULAR; INTRAVENOUS
Start: 2025-07-29

## 2025-07-08 RX ORDER — DIPHENHYDRAMINE HYDROCHLORIDE 50 MG/ML
25 INJECTION, SOLUTION INTRAMUSCULAR; INTRAVENOUS
Start: 2025-08-05

## 2025-07-08 RX ORDER — EPINEPHRINE 1 MG/ML
0.3 INJECTION, SOLUTION INTRAMUSCULAR; SUBCUTANEOUS EVERY 5 MIN PRN
OUTPATIENT
Start: 2025-08-12

## 2025-07-08 RX ORDER — ALBUTEROL SULFATE 0.83 MG/ML
2.5 SOLUTION RESPIRATORY (INHALATION)
OUTPATIENT
Start: 2025-07-29

## 2025-07-08 RX ORDER — METHYLPREDNISOLONE SODIUM SUCCINATE 40 MG/ML
40 INJECTION INTRAMUSCULAR; INTRAVENOUS
Start: 2025-08-12

## 2025-07-08 RX ORDER — LORAZEPAM 2 MG/ML
0.5 INJECTION INTRAMUSCULAR EVERY 4 HOURS PRN
OUTPATIENT
Start: 2025-07-29

## 2025-07-08 RX ORDER — MEPERIDINE HYDROCHLORIDE 25 MG/ML
25 INJECTION INTRAMUSCULAR; INTRAVENOUS; SUBCUTANEOUS
OUTPATIENT
Start: 2025-07-29

## 2025-07-08 RX ORDER — EPINEPHRINE 1 MG/ML
0.3 INJECTION, SOLUTION INTRAMUSCULAR; SUBCUTANEOUS EVERY 5 MIN PRN
OUTPATIENT
Start: 2025-07-29

## 2025-07-08 RX ORDER — LORAZEPAM 2 MG/ML
0.5 INJECTION INTRAMUSCULAR EVERY 4 HOURS PRN
OUTPATIENT
Start: 2025-08-12

## 2025-07-08 RX ORDER — ALBUTEROL SULFATE 90 UG/1
1-2 INHALANT RESPIRATORY (INHALATION)
Start: 2025-08-05

## 2025-07-08 RX ORDER — HEPARIN SODIUM (PORCINE) LOCK FLUSH IV SOLN 100 UNIT/ML 100 UNIT/ML
5 SOLUTION INTRAVENOUS
OUTPATIENT
Start: 2025-07-29

## 2025-07-08 RX ORDER — DIPHENHYDRAMINE HYDROCHLORIDE 50 MG/ML
25 INJECTION, SOLUTION INTRAMUSCULAR; INTRAVENOUS
Start: 2025-08-12

## 2025-07-08 RX ORDER — METHYLPREDNISOLONE SODIUM SUCCINATE 40 MG/ML
40 INJECTION INTRAMUSCULAR; INTRAVENOUS
Start: 2025-07-29

## 2025-07-08 NOTE — PROGRESS NOTES
Virtual Visit Details    Type of service:  Video Visit   Video Start Time: 3:40 PM  Video End Time:4:00PM    Originating Location (pt. Location): Home    Distant Location (provider location):  On-site  Platform used for Video Visit: Tae    HEMATOLOGY HISTORY: Mrs. Dunaway is a lady with multiple myeloma (IgG kappa). High risk, t(14;16).     1.  Evaluated for cytopenia in 2017.  -On 06/21/2017, WBC of 3.3, hemoglobin 9.6 and platelet 149.  - On 05/01/2017, normal creatinine of 0.66 and normal calcium of 8.9.  -SPEP on 07/07/2017 revealed M-spike of 1.8.     2. Bone marrow biopsy on 07/12/2017 reveals kappa monotypic plasma cell population consistent with multiple myeloma.  Bone marrow is 70% cellular.  Plasma cell is 50-60%.     -Cytogenetics revealed multiple abnormalities.   -FISH: There is gain of chromosome 1, 5, 9, 15, and 17.  There is translocation 14;16.      3.  On 07/18/2017:  -M-spike of 1.9.   -Immunofixation reveals monoclonal IgG kappa.    -IgG level of 2970.  IgA of 15 and IgM of 175.    -Kappa free light chain of 67.7.  Lambda free light chain of 1.42.  Ratio of kappa to lambda of 47.71.    -Beta 2 microglobulin of 3.4.      4. PET scan on 07/24/2017 reveals several focal areas of increased FDG uptake consistent with multiple myeloma.  There is probable epithelial cyst in tail of the pancreas.  No associated abnormal FDG activity.  Left thyroid cysts or nodules without any FDG activity.  There is a 0.3 cm left upper lobe lung nodule.      5.  Velcade, Revlimid and dexamethasone between on 08/14/2017 and 04/30/2018. Velcade stopped after 04/30/2018.   -Revlimid and dexamethasone continued.  -Revlimid held between 12/27/2021 and 03/08/2022.  -Daratumumab added on 11/14/2022.  -Changed to daratumumab, pomalidomide and dexamethasone on 06/12/2023.  Pomalidomide dose reduced due to side effects.  Pomalidomide reduced to 1 mg a day for 2 weeks on, 2 week off in March 2025.     6. Brain MRI on 12/13/2022  does reveals 1.5 cm left parafalcine meningioma.     7. Hospitalized between 02/18/2025 and 02/21/2025 for influenza B causing bilateral pneumonia and hypoxemic respiratory failure.     8.  PET scan on 06/23/2025 reveals diffuse FDG uptake within the axial and appendicular skeleton.    9.  Patient evaluated at [facility].  Not a candidate for CAR-T therapy.     SUBJECTIVE:    Ms. Dunaway is an 89-year-old female with multiple myeloma on daratumumab, pomalidomide and dexamethasone.  Her myeloma has been progressing.    Patient was evaluated at [facility] for CAR-T therapy.  Given her age and comorbid conditions, they are not recommending CAR-T therapy.    I had a video visit with the patient and her family regarding further treatment.      Her overall condition is stable. Patient has generalized weakness.  She is able to do all her activities.  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     No headache.  She has chronic dizziness.  She previously has been evaluated by neurologist.  No worsening of dizziness.       No chest pain.  No shortness of breath at rest.  No abdominal pain.  No vomiting.  No urinary complaints.  No bowel problem.  No bleeding.     Patient has dental problem. Because of that, we are holding Zometa.     PHYSICAL EXAMINATION:   Alert and oriented x 3.  Not in any respiratory distress.  Rest of the system is not examined.     LABORATORY: Reviewed.      ASSESSMENT:    1.  An 89-year-old female with multiple myeloma on daratumumab, pomalidomide and dexamethasone. Myeloma is progressing.   2.  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  3.  Thrombocytopenia.  4.  Chronic dizziness. Stable.  5.  Generalized weakness from myeloma, anemia, other medical problems and old age.    6.  Dental problem.     PLAN:  -Change treatment to carfilzomib, selinexor and dexamethasone.  -See me or ROSA MARIA when she comes to start treatment.     DISCUSSION:  1.  I had a video  visit with the patient and her family.  Recommendation from [facility] reviewed.  They are not recommending CAR-T therapy because of her age and comorbid condition.  She will be at high risk of complications.    I discussed with her regarding further treatment of myeloma.  As recommended by [facility], discussed with her regarding combination of carfilzomib, selinexor and dexamethasone.  Selinexor will be at a lower dose.  Regimen and side effects reviewed.  After discussion, patient agreeable for it.  It will be scheduled.    2.  Patient and family had few questions which were all answered.  She will be seen when she comes to start treatment.    Total video visit time of 30 minutes. Time spent in today's video, review of chart/investigations today, monitoring for toxicity of high risk medications and documentation.

## 2025-07-08 NOTE — LETTER
7/8/2025      Quiana Dunaway  [address]  [place] [address]      Dear Colleague,    Thank you for referring your patient, Quiana Dunaway, to the [facility] [place]. Please see a copy of my visit note below.    Virtual Visit Details    Type of service:  Video Visit   Video Start Time: 3:40 PM  Video End Time:4:00PM    Originating Location (pt. Location): Home    Distant Location (provider location):  On-site  Platform used for Video Visit: [facility]    HEMATOLOGY HISTORY: Mrs. Dunaway is a lady with multiple myeloma (IgG kappa). High risk, t(14;16).     1.  Evaluated for cytopenia in 2017.  -On 06/21/2017, WBC of 3.3, hemoglobin 9.6 and platelet 149.  - On 05/01/2017, normal creatinine of 0.66 and normal calcium of 8.9.  -SPEP on 07/07/2017 revealed M-spike of 1.8.     2. Bone marrow biopsy on 07/12/2017 reveals kappa monotypic plasma cell population consistent with multiple myeloma.  Bone marrow is 70% cellular.  Plasma cell is 50-60%.     -Cytogenetics revealed multiple abnormalities.   -FISH: There is gain of chromosome 1, 5, 9, 15, and 17.  There is translocation 14;16.      3.  On 07/18/2017:  -M-spike of 1.9.   -Immunofixation reveals monoclonal IgG kappa.    -IgG level of 2970.  IgA of 15 and IgM of 175.    -Kappa free light chain of 67.7.  Lambda free light chain of 1.42.  Ratio of kappa to lambda of 47.71.    -Beta 2 microglobulin of 3.4.      4. PET scan on 07/24/2017 reveals several focal areas of increased FDG uptake consistent with multiple myeloma.  There is probable epithelial cyst in tail of the pancreas.  No associated abnormal FDG activity.  Left thyroid cysts or nodules without any FDG activity.  There is a 0.3 cm left upper lobe lung nodule.      5.  Velcade, Revlimid and dexamethasone between on 08/14/2017 and 04/30/2018. Velcade stopped after 04/30/2018.   -Revlimid and dexamethasone continued.  -Revlimid held between 12/27/2021 and 03/08/2022.  -Daratumumab  added on 11/14/2022.  -Changed to daratumumab, pomalidomide and dexamethasone on 06/12/2023.  Pomalidomide dose reduced due to side effects.  Pomalidomide reduced to 1 mg a day for 2 weeks on, 2 week off in March 2025.     6. 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     7. Hospitalized between 02/18/2025 and 02/21/2025 for influenza B causing bilateral pneumonia and hypoxemic respiratory failure.     8.  PET scan on 06/23/2025 reveals diffuse FDG uptake within the axial and appendicular skeleton.    9.  Patient evaluated at [facility].  Not a candidate for CAR-T therapy.     SUBJECTIVE:    Ms. Dunaway is an 89-year-old female with multiple myeloma on daratumumab, pomalidomide and dexamethasone.  Her myeloma has been progressing.    Patient was evaluated at [facility] for CAR-T therapy.  Given her age and comorbid conditions, they are not recommending CAR-T therapy.    I had a video visit with the patient and her family regarding further treatment.      Her overall condition is stable. Patient has generalized weakness.  She is able to do all her activities.  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     No headache.  She has chronic dizziness.  She previously has been evaluated by neurologist.  No worsening of dizziness.       No chest pain.  No shortness of breath at rest.  No abdominal pain.  No vomiting.  No urinary complaints.  No bowel problem.  No bleeding.     Patient has dental problem. Because of that, we are holding Zometa.     PHYSICAL EXAMINATION:   Alert and oriented x 3.  Not in any respiratory distress.  Rest of the system is not examined.     LABORATORY: Reviewed.      ASSESSMENT:    1.  An 89-year-old female with multiple myeloma on daratumumab, pomalidomide and dexamethasone. Myeloma is progressing.   2.  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  3.  Thrombocytopenia.  4.  Chronic dizziness. Stable.  5.  Generalized  weakness from myeloma, anemia, other medical problems and old age.    6.  Dental problem.     PLAN:  -Change treatment to carfilzomib, selinexor and dexamethasone.  -See me or ROSA MARIA when she comes to start treatment.     DISCUSSION:  1.  I had a video visit with the patient and her family.  Recommendation from [facility] reviewed.  They are not recommending CAR-T therapy because of her age and comorbid condition.  She will be at high risk of complications.    I discussed with her regarding further treatment of myeloma.  As recommended by [facility], discussed with her regarding combination of carfilzomib, selinexor and dexamethasone.  Selinexor will be at a lower dose.  Regimen and side effects reviewed.  After discussion, patient agreeable for it.  It will be scheduled.    2.  Patient and family had few questions which were all answered.  She will be seen when she comes to start treatment.    Total video visit time of 30 minutes. Time spent in today's video, review of chart/investigations today, monitoring for toxicity of high risk medications and documentation.        Again, thank you for allowing me to participate in the care of your patient.        Sincerely,        Alex Parry MD    Electronically signed

## 2025-07-08 NOTE — NURSING NOTE
Current patient location: Patient declined to provide     Is the patient currently in the state of [place]? YES    Visit mode: VIDEO    If the visit is dropped, the patient can be reconnected by:VIDEO VISIT: Text to cell phone:   Telephone Information:   Mobile [phone]       Will anyone else be joining the visit? NO  (If patient encounters technical issues they should call [phone]    Are changes needed to the allergy or medication list? No    Are refills needed on medications prescribed by this physician? NO    Rooming Documentation:  Not applicable    Reason for visit: RECHECK    Alex PAIZ

## 2025-07-08 NOTE — PATIENT INSTRUCTIONS
-Change treatment to carfilzomib, selinexor and dexamethasone.  -See me or ROSA MARIA when she comes to start treatment.

## 2025-07-08 NOTE — LETTER
7/8/2025      Quiana Dunaway  [address]  [facility] [address]      Dear Colleague,    Thank you for referring your patient, Quiana Dunaway, to the [facility] [place]. Please see a copy of my visit note below.    Virtual Visit Details    Type of service:  Video Visit   Video Start Time: 3:40 PM  Video End Time:4:00PM    Originating Location (pt. Location): Home    Distant Location (provider location):  On-site  Platform used for Video Visit: [facility]    HEMATOLOGY HISTORY: Mrs. Dunaway is a lady with multiple myeloma (IgG kappa). High risk, t(14;16).     1.  Evaluated for cytopenia in 2017.  -On 06/21/2017, WBC of 3.3, hemoglobin 9.6 and platelet 149.  - On 05/01/2017, normal creatinine of 0.66 and normal calcium of 8.9.  -SPEP on 07/07/2017 revealed M-spike of 1.8.     2. Bone marrow biopsy on 07/12/2017 reveals kappa monotypic plasma cell population consistent with multiple myeloma.  Bone marrow is 70% cellular.  Plasma cell is 50-60%.     -Cytogenetics revealed multiple abnormalities.   -FISH: There is gain of chromosome 1, 5, 9, 15, and 17.  There is translocation 14;16.      3.  On 07/18/2017:  -M-spike of 1.9.   -Immunofixation reveals monoclonal IgG kappa.    -IgG level of 2970.  IgA of 15 and IgM of 175.    -Kappa free light chain of 67.7.  Lambda free light chain of 1.42.  Ratio of kappa to lambda of 47.71.    -Beta 2 microglobulin of 3.4.      4. PET scan on 07/24/2017 reveals several focal areas of increased FDG uptake consistent with multiple myeloma.  There is probable epithelial cyst in tail of the pancreas.  No associated abnormal FDG activity.  Left thyroid cysts or nodules without any FDG activity.  There is a 0.3 cm left upper lobe lung nodule.      5.  Velcade, Revlimid and dexamethasone between on 08/14/2017 and 04/30/2018. Velcade stopped after 04/30/2018.   -Revlimid and dexamethasone continued.  -Revlimid held between 12/27/2021 and 03/08/2022.  -Daratumumab  added on 11/14/2022.  -Changed to daratumumab, pomalidomide and dexamethasone on 06/12/2023.  Pomalidomide dose reduced due to side effects.  Pomalidomide reduced to 1 mg a day for 2 weeks on, 2 week off in March 2025.     6. 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     7. Hospitalized between 02/18/2025 and 02/21/2025 for influenza B causing bilateral pneumonia and hypoxemic respiratory failure.     8.  PET scan on 06/23/2025 reveals diffuse FDG uptake within the axial and appendicular skeleton.    9.  Patient evaluated at [facility].  Not a candidate for CAR-T therapy.     SUBJECTIVE:    Ms. Dunaway is an 89-year-old female with multiple myeloma on daratumumab, pomalidomide and dexamethasone.  Her myeloma has been progressing.    Patient was evaluated at [facility] for CAR-T therapy.  Given her age and comorbid conditions, they are not recommending CAR-T therapy.    I had a video visit with the patient and her family regarding further treatment.      Her overall condition is stable. Patient has generalized weakness.  She is able to do all her activities.  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     No headache.  She has chronic dizziness.  She previously has been evaluated by neurologist.  No worsening of dizziness.       No chest pain.  No shortness of breath at rest.  No abdominal pain.  No vomiting.  No urinary complaints.  No bowel problem.  No bleeding.     Patient has dental problem. Because of that, we are holding Zometa.     PHYSICAL EXAMINATION:   Alert and oriented x 3.  Not in any respiratory distress.  Rest of the system is not examined.     LABORATORY: Reviewed.      ASSESSMENT:    1.  An 89-year-old female with multiple myeloma on daratumumab, pomalidomide and dexamethasone. Myeloma is progressing.   2.  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  3.  Thrombocytopenia.  4.  Chronic dizziness. Stable.  5.  Generalized  weakness from myeloma, anemia, other medical problems and old age.    6.  Dental problem.     PLAN:  -Change treatment to carfilzomib, selinexor and dexamethasone.  -See me or ROSA MARIA when she comes to start treatment.     DISCUSSION:  1.  I had a video visit with the patient and her family.  Recommendation from [facility] reviewed.  They are not recommending CAR-T therapy because of her age and comorbid condition.  She will be at high risk of complications.    I discussed with her regarding further treatment of myeloma.  As recommended by [facility], discussed with her regarding combination of carfilzomib, selinexor and dexamethasone.  Selinexor will be at a lower dose.  Regimen and side effects reviewed.  After discussion, patient agreeable for it.  It will be scheduled.    2.  Patient and family had few questions which were all answered.  She will be seen when she comes to start treatment.    Total video visit time of 30 minutes. Time spent in today's video, review of chart/investigations today, monitoring for toxicity of high risk medications and documentation.        Again, thank you for allowing me to participate in the care of your patient.        Sincerely,        Alex Parry MD    Electronically signed

## 2025-07-09 ENCOUNTER — DOCUMENTATION ONLY (OUTPATIENT)
Dept: ONCOLOGY | Facility: CLINIC | Age: 89
End: 2025-07-09
Payer: MEDICARE

## 2025-07-09 ENCOUNTER — TELEPHONE (OUTPATIENT)
Dept: ONCOLOGY | Facility: CLINIC | Age: 89
End: 2025-07-09
Payer: MEDICARE

## 2025-07-09 NOTE — TELEPHONE ENCOUNTER
PA Initiation    Medication: XPOVIO (40 MG ONCE WEEKLY) 40 MG PO TBPK  Insurance Company: [employer] - Phone [phone] Fax [phone]  Pharmacy Filling the Rx:    Filling Pharmacy Phone:    Filling Pharmacy Fax:    Start Date: 7/9/2025

## 2025-07-13 RX ORDER — HEPARIN SODIUM,PORCINE 10 UNIT/ML
5-20 VIAL (ML) INTRAVENOUS DAILY PRN
OUTPATIENT
Start: 2025-09-02

## 2025-07-13 RX ORDER — ALBUTEROL SULFATE 0.83 MG/ML
2.5 SOLUTION RESPIRATORY (INHALATION)
OUTPATIENT
Start: 2025-09-09

## 2025-07-13 RX ORDER — EPINEPHRINE 1 MG/ML
0.3 INJECTION, SOLUTION INTRAMUSCULAR; SUBCUTANEOUS EVERY 5 MIN PRN
OUTPATIENT
Start: 2025-09-09

## 2025-07-13 RX ORDER — DIPHENHYDRAMINE HYDROCHLORIDE 50 MG/ML
50 INJECTION, SOLUTION INTRAMUSCULAR; INTRAVENOUS
Start: 2025-09-02

## 2025-07-13 RX ORDER — HEPARIN SODIUM (PORCINE) LOCK FLUSH IV SOLN 100 UNIT/ML 100 UNIT/ML
5 SOLUTION INTRAVENOUS
OUTPATIENT
Start: 2025-09-09

## 2025-07-13 RX ORDER — METHYLPREDNISOLONE SODIUM SUCCINATE 40 MG/ML
40 INJECTION INTRAMUSCULAR; INTRAVENOUS
Start: 2025-09-02

## 2025-07-13 RX ORDER — HEPARIN SODIUM (PORCINE) LOCK FLUSH IV SOLN 100 UNIT/ML 100 UNIT/ML
5 SOLUTION INTRAVENOUS
OUTPATIENT
Start: 2025-08-26

## 2025-07-13 RX ORDER — HEPARIN SODIUM,PORCINE 10 UNIT/ML
5-20 VIAL (ML) INTRAVENOUS DAILY PRN
OUTPATIENT
Start: 2025-08-26

## 2025-07-13 RX ORDER — MEPERIDINE HYDROCHLORIDE 25 MG/ML
25 INJECTION INTRAMUSCULAR; INTRAVENOUS; SUBCUTANEOUS
OUTPATIENT
Start: 2025-09-09

## 2025-07-13 RX ORDER — DIPHENHYDRAMINE HYDROCHLORIDE 50 MG/ML
25 INJECTION, SOLUTION INTRAMUSCULAR; INTRAVENOUS
Start: 2025-09-09

## 2025-07-13 RX ORDER — MEPERIDINE HYDROCHLORIDE 25 MG/ML
25 INJECTION INTRAMUSCULAR; INTRAVENOUS; SUBCUTANEOUS
OUTPATIENT
Start: 2025-08-26

## 2025-07-13 RX ORDER — EPINEPHRINE 1 MG/ML
0.3 INJECTION, SOLUTION INTRAMUSCULAR; SUBCUTANEOUS EVERY 5 MIN PRN
OUTPATIENT
Start: 2025-08-26

## 2025-07-13 RX ORDER — ALBUTEROL SULFATE 90 UG/1
1-2 INHALANT RESPIRATORY (INHALATION)
Start: 2025-09-09

## 2025-07-13 RX ORDER — HEPARIN SODIUM,PORCINE 10 UNIT/ML
5-20 VIAL (ML) INTRAVENOUS DAILY PRN
OUTPATIENT
Start: 2025-09-09

## 2025-07-13 RX ORDER — LORAZEPAM 2 MG/ML
0.5 INJECTION INTRAMUSCULAR EVERY 4 HOURS PRN
OUTPATIENT
Start: 2025-09-09

## 2025-07-13 RX ORDER — EPINEPHRINE 1 MG/ML
0.3 INJECTION, SOLUTION INTRAMUSCULAR; SUBCUTANEOUS EVERY 5 MIN PRN
OUTPATIENT
Start: 2025-09-02

## 2025-07-13 RX ORDER — DIPHENHYDRAMINE HYDROCHLORIDE 50 MG/ML
50 INJECTION, SOLUTION INTRAMUSCULAR; INTRAVENOUS
Start: 2025-08-26

## 2025-07-13 RX ORDER — DIPHENHYDRAMINE HYDROCHLORIDE 50 MG/ML
50 INJECTION, SOLUTION INTRAMUSCULAR; INTRAVENOUS
Start: 2025-09-09

## 2025-07-13 RX ORDER — METHYLPREDNISOLONE SODIUM SUCCINATE 40 MG/ML
40 INJECTION INTRAMUSCULAR; INTRAVENOUS
Start: 2025-09-09

## 2025-07-13 RX ORDER — HEPARIN SODIUM (PORCINE) LOCK FLUSH IV SOLN 100 UNIT/ML 100 UNIT/ML
5 SOLUTION INTRAVENOUS
OUTPATIENT
Start: 2025-09-02

## 2025-07-13 RX ORDER — DIPHENHYDRAMINE HYDROCHLORIDE 50 MG/ML
25 INJECTION, SOLUTION INTRAMUSCULAR; INTRAVENOUS
Start: 2025-09-02

## 2025-07-13 RX ORDER — LORAZEPAM 2 MG/ML
0.5 INJECTION INTRAMUSCULAR EVERY 4 HOURS PRN
OUTPATIENT
Start: 2025-09-02

## 2025-07-13 RX ORDER — ALBUTEROL SULFATE 0.83 MG/ML
2.5 SOLUTION RESPIRATORY (INHALATION)
OUTPATIENT
Start: 2025-09-02

## 2025-07-13 RX ORDER — ALBUTEROL SULFATE 90 UG/1
1-2 INHALANT RESPIRATORY (INHALATION)
Start: 2025-09-02

## 2025-07-13 RX ORDER — ALBUTEROL SULFATE 90 UG/1
1-2 INHALANT RESPIRATORY (INHALATION)
Start: 2025-08-26

## 2025-07-13 RX ORDER — DIPHENHYDRAMINE HYDROCHLORIDE 50 MG/ML
25 INJECTION, SOLUTION INTRAMUSCULAR; INTRAVENOUS
Start: 2025-08-26

## 2025-07-13 RX ORDER — ALBUTEROL SULFATE 0.83 MG/ML
2.5 SOLUTION RESPIRATORY (INHALATION)
OUTPATIENT
Start: 2025-08-26

## 2025-07-13 RX ORDER — METHYLPREDNISOLONE SODIUM SUCCINATE 40 MG/ML
40 INJECTION INTRAMUSCULAR; INTRAVENOUS
Start: 2025-08-26

## 2025-07-13 RX ORDER — LORAZEPAM 2 MG/ML
0.5 INJECTION INTRAMUSCULAR EVERY 4 HOURS PRN
OUTPATIENT
Start: 2025-08-26

## 2025-07-13 RX ORDER — MEPERIDINE HYDROCHLORIDE 25 MG/ML
25 INJECTION INTRAMUSCULAR; INTRAVENOUS; SUBCUTANEOUS
OUTPATIENT
Start: 2025-09-02

## 2025-07-14 ENCOUNTER — DOCUMENTATION ONLY (OUTPATIENT)
Dept: ONCOLOGY | Facility: CLINIC | Age: 89
End: 2025-07-14
Payer: MEDICARE

## 2025-07-14 NOTE — PROGRESS NOTES
Oral Chemotherapy Monitoring Program     Placed call to patient's daughter in law Deluna in follow up of oral chemotherapy (Selinexor). Hoping to do new medication education and release to [facility] pharmacy.     Left message to please call back otherwise we will attempt a call in the next few days. No patient or drug names were mentioned.     Follow Up:  7/15: call KEIRY Deluna again and consider releasing Selinexor to [facility]    Neno Canales PharmD  [facility] Oral Chemotherapy  [phone]  July 14, 2025

## 2025-07-15 DIAGNOSIS — C90.00 MULTIPLE MYELOMA NOT HAVING ACHIEVED REMISSION (H): Primary | ICD-10-CM

## 2025-07-15 DIAGNOSIS — E53.8 VITAMIN B12 DEFICIENCY (NON ANEMIC): ICD-10-CM

## 2025-07-15 RX ORDER — LANOLIN ALCOHOL/MO/W.PET/CERES
1000 CREAM (GRAM) TOPICAL DAILY
Qty: 90 TABLET | Refills: 1 | Status: SHIPPED | OUTPATIENT
Start: 2025-07-15

## 2025-07-15 RX ORDER — SELINEXOR 40 MG/1
40 TABLET, FILM COATED ORAL WEEKLY
Qty: 4 EACH | Refills: 0 | Status: SHIPPED | OUTPATIENT
Start: 2025-07-29

## 2025-07-16 ENCOUNTER — TELEPHONE (OUTPATIENT)
Dept: ONCOLOGY | Facility: CLINIC | Age: 89
End: 2025-07-16
Payer: MEDICARE

## 2025-07-16 ENCOUNTER — TELEPHONE (OUTPATIENT)
Dept: PHARMACY | Facility: CLINIC | Age: 89
End: 2025-07-16
Payer: MEDICARE

## 2025-07-16 ENCOUNTER — PATIENT OUTREACH (OUTPATIENT)
Dept: ONCOLOGY | Facility: CLINIC | Age: 89
End: 2025-07-16
Payer: MEDICARE

## 2025-07-16 DIAGNOSIS — C90.00 MULTIPLE MYELOMA NOT HAVING ACHIEVED REMISSION (H): Primary | ICD-10-CM

## 2025-07-16 RX ORDER — ACYCLOVIR 400 MG/1
400 TABLET ORAL 2 TIMES DAILY
Qty: 60 TABLET | Refills: 5 | Status: SHIPPED | OUTPATIENT
Start: 2025-07-16

## 2025-07-16 RX ORDER — OLANZAPINE 2.5 MG/1
TABLET, FILM COATED ORAL
Qty: 30 TABLET | Refills: 1 | Status: SHIPPED | OUTPATIENT
Start: 2025-07-16

## 2025-07-16 NOTE — TELEPHONE ENCOUNTER
[facility] Prior Authorization Team   Phone: [phone]  Fax: [phone]    PA Initiation    Medication: OLANZAPINE 2.5 MG PO TABS  Insurance Company: [employer] - Phone [phone] Fax [phone]  Pharmacy Filling the Rx: [facility] - [place], [place] - [address]  Filling Pharmacy Phone:    Filling Pharmacy Fax:    Start Date: 7/16/2025

## 2025-07-16 NOTE — ORAL ONC MGMT
Oral Chemotherapy Monitoring Program     Placed call to patient's granddaughter in follow up of axitinib therapy questions.     Left message to please call back in follow-up of therapy. No patient or drug names were mentioned.     Umang Peoples PharmD  [facility]   [phone]   7/16/2025

## 2025-07-16 NOTE — PROGRESS NOTES
[facility]: Cancer Care Plan of Care Education Note                                    Discussion with Patient:                                                      Writer spoke with patient's daughter in law Deluna and another family member over the phone. They are the primary caregivers for the patient.      Antiemetics: patient has compazine and zofran already at home along with Acyclovir.  Writer also released Zyprexa family has concerns about patient taking it. Writer will have pharmacy review further the medication and answer questions.         Assessment:                                                      Assessment completed with:: Family, Children    Plan of Care Education   Yearly learning assessment completed?: (P) Yes (see Education tab)  Diagnosis:: (P) multiple myeloma  Does patient understand diagnosis?: (P) Yes  Tx plan/regimen:: (P) Selinexor / Carfilzomib / Dexamethasone - Weekly  Does patient understand treatment plan/regimen?: (P) Yes  Preparing for treatment:: (P) Reviewed treatment preparation information with patient (vascular access, day of chemo, visitor policy, what to bring, etc.)  Vascular access education provided for:: (P) Port  Side effect education:: (P) Diarrhea/Constipation, Fatigue, Immune-mediated effects, Lab value monitoring (anemia, neutropenia, thrombocytopenia), Mylosuppression, Nausea/Vomiting  Safety/self care at home reviewed with patient:: (P) Yes  Coping - concerns/fears reviewed with patient:: (P) Yes  Plan of Care:: (P) Lab appointment, MD follow-up appointment, Treatment schedule  When to call provider:: (P) Bleeding, Increased shortness of breath, New/worsening pain, Shaking chills, Temperature >100.4F, Uncontrolled diarrhea/constipation, Uncontrolled nausea/vomiting  Reasons for deferring treatment reviewed with patient:: (P) Yes  Procedure education provided for: : (P) Port/PICC placement    Evaluation of Learning  Patient Education Provided: (P)  Yes  Readiness:: (P) Acceptance  Method:: (P) Explanation, Literature  Response:: (P) Verbalizes understanding    Plan of Care Education Review:   Assessment completed with:: Family, Children    Plan of Care Education   Yearly learning assessment completed?: (P) Yes (see Education tab)  Diagnosis:: (P) multiple myeloma  Does patient understand diagnosis?: (P) Yes  Tx plan/regimen:: (P) Selinexor / Carfilzomib / Dexamethasone - Weekly  Does patient understand treatment plan/regimen?: (P) Yes  Preparing for treatment:: (P) Reviewed treatment preparation information with patient (vascular access, day of chemo, visitor policy, what to bring, etc.)  Vascular access education provided for:: (P) Port  Side effect education:: (P) Diarrhea/Constipation, Fatigue, Immune-mediated effects, Lab value monitoring (anemia, neutropenia, thrombocytopenia), Mylosuppression, Nausea/Vomiting  Safety/self care at home reviewed with patient:: (P) Yes  Coping - concerns/fears reviewed with patient:: (P) Yes  Plan of Care:: (P) Lab appointment, MD follow-up appointment, Treatment schedule  When to call provider:: (P) Bleeding, Increased shortness of breath, New/worsening pain, Shaking chills, Temperature >100.4F, Uncontrolled diarrhea/constipation, Uncontrolled nausea/vomiting  Reasons for deferring treatment reviewed with patient:: (P) Yes  Procedure education provided for: : (P) Port/PICC placement    Evaluation of Learning  Patient Education Provided: (P) Yes  Readiness:: (P) Acceptance  Method:: (P) Explanation, Literature  Response:: (P) Verbalizes understanding           Intervention/Education provided during outreach:                                                         Follow up call in 1-2 weeks  Confirmed patient has clinic and triage numbers. Family also advised to MyChart with updates or non symptom related messages.     Signature:  Alecia Briggs RN

## 2025-07-16 NOTE — PROGRESS NOTES
07/16/25 1034   OTHER   Assessment completed with: Family;Children   Plan of Care Education    Yearly learning assessment completed? Yes (see Education tab)   Diagnosis: multiple myeloma   Does patient understand diagnosis? Yes   Tx plan/regimen: Selinexor / Carfilzomib / Dexamethasone - Weekly   Does patient understand treatment plan/regimen? Yes   Preparing for treatment: Reviewed treatment preparation information with patient (vascular access, day of chemo, visitor policy, what to bring, etc.)   Vascular access education provided for: Port   Side effect education: Diarrhea/Constipation;Fatigue;Immune-mediated effects;Lab value monitoring (anemia, neutropenia, thrombocytopenia);Mylosuppression;Nausea/Vomiting   Safety/self care at home reviewed with patient: Yes   Coping - concerns/fears reviewed with patient: Yes   Plan of Care: Lab appointment;MD follow-up appointment;Treatment schedule   When to call provider: Bleeding;Increased shortness of breath;New/worsening pain;Shaking chills;Temperature >100.4F;Uncontrolled diarrhea/constipation;Uncontrolled nausea/vomiting   Reasons for deferring treatment reviewed with patient: Yes   Procedure education provided for:  Port/PICC placement   Transportation means: Family   Informal Support system: Family;Children   Evaluation of Learning   Patient Education Provided Yes   Readiness: Acceptance   Method: Explanation;Literature   Response: Verbalizes understanding

## 2025-07-17 ENCOUNTER — PATIENT OUTREACH (OUTPATIENT)
Dept: CARE COORDINATION | Facility: CLINIC | Age: 89
End: 2025-07-17
Payer: MEDICARE

## 2025-07-17 NOTE — TELEPHONE ENCOUNTER
Prior Authorization Approval    Medication: OLANZAPINE 2.5 MG PO TABS  Authorization Effective Date: 4/17/2025  Authorization Expiration Date: 7/16/2026  Approved Dose/Quantity: 30/30 DAYS  Reference #: [id number]   Insurance Company: [employer] - Phone [phone] Fax [phone]  Expected CoPay: $ 0  CoPay Card Available: No    Financial Assistance Needed: NO  Which Pharmacy is filling the prescription: [facility] - [place], [place] - [address]  Pharmacy Notified: YES  Patient Notified: YES

## 2025-07-21 ENCOUNTER — PATIENT OUTREACH (OUTPATIENT)
Dept: CARE COORDINATION | Facility: CLINIC | Age: 89
End: 2025-07-21
Payer: MEDICARE

## 2025-07-23 ENCOUNTER — VIRTUAL VISIT (OUTPATIENT)
Dept: ONCOLOGY | Facility: CLINIC | Age: 89
End: 2025-07-23
Attending: DIETITIAN, REGISTERED
Payer: MEDICARE

## 2025-07-23 ENCOUNTER — APPOINTMENT (OUTPATIENT)
Dept: CT IMAGING | Facility: CLINIC | Age: 89
End: 2025-07-23
Attending: EMERGENCY MEDICINE
Payer: MEDICARE

## 2025-07-23 ENCOUNTER — HOSPITAL ENCOUNTER (EMERGENCY)
Facility: CLINIC | Age: 89
Discharge: HOME | End: 2025-07-23
Attending: EMERGENCY MEDICINE
Payer: MEDICARE

## 2025-07-23 ENCOUNTER — DOCUMENTATION ONLY (OUTPATIENT)
Dept: PHARMACY | Facility: CLINIC | Age: 89
End: 2025-07-23
Payer: MEDICARE

## 2025-07-23 ENCOUNTER — NURSE TRIAGE (OUTPATIENT)
Dept: FAMILY MEDICINE | Facility: CLINIC | Age: 89
End: 2025-07-23

## 2025-07-23 VITALS
TEMPERATURE: 99.4 F | DIASTOLIC BLOOD PRESSURE: 100 MMHG | RESPIRATION RATE: 22 BRPM | HEART RATE: 69 BPM | OXYGEN SATURATION: 95 % | SYSTOLIC BLOOD PRESSURE: 146 MMHG

## 2025-07-23 DIAGNOSIS — C90.00 MULTIPLE MYELOMA NOT HAVING ACHIEVED REMISSION (H): ICD-10-CM

## 2025-07-23 DIAGNOSIS — R07.9 CHEST PAIN, UNSPECIFIED TYPE: Primary | ICD-10-CM

## 2025-07-23 LAB
ANION GAP SERPL CALCULATED.3IONS-SCNC: 9 MMOL/L (ref 7–15)
ATRIAL RATE - MUSE: 65 BPM
BASOPHILS # BLD AUTO: 0 10E3/UL (ref 0–0.2)
BASOPHILS NFR BLD AUTO: 0 %
BUN SERPL-MCNC: 26.2 MG/DL (ref 8–23)
CALCIUM SERPL-MCNC: 9.1 MG/DL (ref 8.8–10.4)
CHLORIDE SERPL-SCNC: 108 MMOL/L (ref 98–107)
CREAT SERPL-MCNC: 0.63 MG/DL (ref 0.51–0.95)
D DIMER PPP FEU-MCNC: 1.32 UG/ML FEU (ref 0–0.5)
DIASTOLIC BLOOD PRESSURE - MUSE: NORMAL MMHG
EGFRCR SERPLBLD CKD-EPI 2021: 84 ML/MIN/1.73M2
ELLIPTOCYTES BLD QL SMEAR: ABNORMAL
EOSINOPHIL # BLD AUTO: 0.2 10E3/UL (ref 0–0.7)
EOSINOPHIL NFR BLD AUTO: 2 %
ERYTHROCYTE [DISTWIDTH] IN BLOOD BY AUTOMATED COUNT: 15.9 % (ref 10–15)
GLUCOSE SERPL-MCNC: 97 MG/DL (ref 70–99)
HCO3 SERPL-SCNC: 24 MMOL/L (ref 22–29)
HCT VFR BLD AUTO: 31.2 % (ref 35–47)
HGB BLD-MCNC: 10.4 G/DL (ref 11.7–15.7)
HOLD SPECIMEN: NORMAL
IMM GRANULOCYTES # BLD: 0.1 10E3/UL
IMM GRANULOCYTES NFR BLD: 1 %
INTERPRETATION ECG - MUSE: NORMAL
LYMPHOCYTES # BLD AUTO: 2.7 10E3/UL (ref 0.8–5.3)
LYMPHOCYTES NFR BLD AUTO: 24 %
MCH RBC QN AUTO: 29.1 PG (ref 26.5–33)
MCHC RBC AUTO-ENTMCNC: 33.3 G/DL (ref 31.5–36.5)
MCV RBC AUTO: 87 FL (ref 78–100)
MONOCYTES # BLD AUTO: 1.9 10E3/UL (ref 0–1.3)
MONOCYTES NFR BLD AUTO: 16 %
NEUTROPHILS # BLD AUTO: 6.6 10E3/UL (ref 1.6–8.3)
NEUTROPHILS NFR BLD AUTO: 58 %
NRBC # BLD AUTO: 0 10E3/UL
NRBC BLD AUTO-RTO: 0 /100
P AXIS - MUSE: 98 DEGREES
PLAT MORPH BLD: ABNORMAL
PLATELET # BLD AUTO: 179 10E3/UL (ref 150–450)
POTASSIUM SERPL-SCNC: 3.4 MMOL/L (ref 3.4–5.3)
PR INTERVAL - MUSE: 198 MS
QRS DURATION - MUSE: 148 MS
QT - MUSE: 484 MS
QTC - MUSE: 503 MS
R AXIS - MUSE: -47 DEGREES
RBC # BLD AUTO: 3.58 10E6/UL (ref 3.8–5.2)
RBC MORPH BLD: ABNORMAL
SODIUM SERPL-SCNC: 141 MMOL/L (ref 135–145)
SYSTOLIC BLOOD PRESSURE - MUSE: NORMAL MMHG
T AXIS - MUSE: 73 DEGREES
TROPONIN T SERPL HS-MCNC: 16 NG/L
TROPONIN T SERPL HS-MCNC: 21 NG/L
VENTRICULAR RATE- MUSE: 65 BPM
WBC # BLD AUTO: 11.5 10E3/UL (ref 4–11)

## 2025-07-23 PROCEDURE — 71275 CT ANGIOGRAPHY CHEST: CPT

## 2025-07-23 PROCEDURE — 99285 EMERGENCY DEPT VISIT HI MDM: CPT | Mod: 25 | Performed by: EMERGENCY MEDICINE

## 2025-07-23 PROCEDURE — 85379 FIBRIN DEGRADATION QUANT: CPT | Performed by: EMERGENCY MEDICINE

## 2025-07-23 PROCEDURE — 80048 BASIC METABOLIC PNL TOTAL CA: CPT | Performed by: EMERGENCY MEDICINE

## 2025-07-23 PROCEDURE — 84484 ASSAY OF TROPONIN QUANT: CPT | Performed by: EMERGENCY MEDICINE

## 2025-07-23 PROCEDURE — 93005 ELECTROCARDIOGRAM TRACING: CPT

## 2025-07-23 PROCEDURE — 85025 COMPLETE CBC W/AUTO DIFF WBC: CPT | Performed by: EMERGENCY MEDICINE

## 2025-07-23 PROCEDURE — 250N000011 HC RX IP 250 OP 636: Performed by: EMERGENCY MEDICINE

## 2025-07-23 PROCEDURE — 36415 COLL VENOUS BLD VENIPUNCTURE: CPT | Performed by: EMERGENCY MEDICINE

## 2025-07-23 PROCEDURE — 97802 MEDICAL NUTRITION INDIV IN: CPT | Mod: GT,95 | Performed by: DIETITIAN, REGISTERED

## 2025-07-23 PROCEDURE — 250N000009 HC RX 250: Performed by: EMERGENCY MEDICINE

## 2025-07-23 RX ORDER — ALBUTEROL SULFATE 0.83 MG/ML
SOLUTION RESPIRATORY (INHALATION)
COMMUNITY
Start: 2025-07-18

## 2025-07-23 RX ORDER — BENZONATATE 100 MG/1
CAPSULE ORAL
COMMUNITY
Start: 2025-07-18

## 2025-07-23 RX ORDER — IOPAMIDOL 755 MG/ML
57 INJECTION, SOLUTION INTRAVASCULAR ONCE
Status: COMPLETED | OUTPATIENT
Start: 2025-07-23 | End: 2025-07-23

## 2025-07-23 RX ORDER — DEXTROSE MONOHYDRATE 25 G/50ML
25-50 INJECTION, SOLUTION INTRAVENOUS
OUTPATIENT
Start: 2025-07-23

## 2025-07-23 RX ORDER — NICOTINE POLACRILEX 4 MG
15-30 LOZENGE BUCCAL
OUTPATIENT
Start: 2025-07-23

## 2025-07-23 RX ORDER — DOXYCYCLINE 100 MG/1
CAPSULE ORAL
COMMUNITY
Start: 2025-07-18 | End: 2025-07-29

## 2025-07-23 RX ADMIN — SODIUM CHLORIDE 84 ML: 9 INJECTION, SOLUTION INTRAVENOUS at 19:21

## 2025-07-23 RX ADMIN — IOPAMIDOL 57 ML: 755 INJECTION, SOLUTION INTRAVENOUS at 19:21

## 2025-07-23 ASSESSMENT — ACTIVITIES OF DAILY LIVING (ADL)
ADLS_ACUITY_SCORE: 57

## 2025-07-23 ASSESSMENT — COLUMBIA-SUICIDE SEVERITY RATING SCALE - C-SSRS
1. IN THE PAST MONTH, HAVE YOU WISHED YOU WERE DEAD OR WISHED YOU COULD GO TO SLEEP AND NOT WAKE UP?: NO
2. HAVE YOU ACTUALLY HAD ANY THOUGHTS OF KILLING YOURSELF IN THE PAST MONTH?: NO
6. HAVE YOU EVER DONE ANYTHING, STARTED TO DO ANYTHING, OR PREPARED TO DO ANYTHING TO END YOUR LIFE?: NO

## 2025-07-23 NOTE — TELEPHONE ENCOUNTER
Patient's granddaughter called the clinic (C2C on file). She was wondering the reasoning for the patient needing to be seen in the ED. Explained to patient the reasoning. She stated understanding and had no further questions.     Darby SU RN  [facility] Triage Team

## 2025-07-23 NOTE — PROGRESS NOTES
Oral Chemotherapy Monitoring Program     Placed call to patient in follow up of oral chemotherapy. Called patient's granddaughter, Suzan, who is an RN to follow up with questions about Selinexor therapy. Suzan reports that her questions have already been answered and she has no further questions.    Follow-up:   7/29: baseline labs and mace  ~8/5: 1-week follow-up call     Seun Estrella  Pharmacy Intern   [facility]   [phone]

## 2025-07-23 NOTE — TELEPHONE ENCOUNTER
Nurse Triage SBAR    Is this a 2nd Level Triage? YES, LICENSED PRACTITIONER REVIEW IS REQUIRED    Situation: Patient  complains of intermittent chest pain since yesterday.    Background: Patient has history of multiple myeloma HTN.  She reports  shortness of breath.  She denies chest pain during call.  She has had cough for 2 to 3 weeks which has improved.  Oxygen level during call 96% and pulse is 73.    Assessment: Patient needs to be evaluated.    Protocol Recommended Disposition:   Call ADS/Go to ED/UCC Now (Or To Office with PCP Approval), Go to ED Now    Recommendation: Huddle with ADS.    Paged to provider    Does the patient meet one of the following criteria for ADS visit consideration? 16+ years old, with an [facility] PCP     TIP  Providers, please consider if this condition is appropriate for management at one of our Acute and Diagnostic Services sites.     If patient is a good candidate, please use dotphrase <dot>triageresponse and select Refer to ADS to document.        Reason for Disposition   Cancer treatment in the past two months (or has cancer now)   Chest pain lasting longer than 5 minutes and occurred in last 3 days (72 hours) (Exception: Feels exactly the same as previously diagnosed heartburn and has accompanying sour taste in mouth.)    Additional Information   Negative: SEVERE difficulty breathing (e.g., struggling for each breath, speaks in single words)   Negative: Difficult to awaken or acting confused (e.g., disoriented, slurred speech)   Negative: Passed out (i.e., lost consciousness, collapsed and was not responding)   Negative: Shock suspected (e.g., cold/pale/clammy skin, too weak to stand, low BP, rapid pulse)   Negative: Chest pain lasting longer than 5 minutes and ANY of the following:         Pain is crushing, pressure-like, or heavy         Over 44 years old          Over 30 years old and one cardiac risk factor (e.g diabetes, high blood pressure, high cholesterol, smoker, or family  history of heart disease)         History of heart disease (e.g. angina, heart attack, heart failure, bypass surgery, takes nitroglycerin)   Negative: Heart beating < 50 beats per minute OR > 140 beats per minute   Negative: Visible sweat on face or sweat dripping down face   Negative: Sounds like a life-threatening emergency to the triager   Negative: Followed an injury to chest   Negative: SEVERE chest pain   Negative: Pain also in shoulder(s) or arm(s) or jaw   Negative: Difficulty breathing   Negative: Cocaine use within last 3 days   Negative: Major surgery in the past month   Negative: Hip or leg fracture (broken bone) in past month (or had cast on leg or ankle in past month)   Negative: Illness requiring prolonged bedrest in past month (e.g., immobilization, long hospital stay)   Negative: Long-distance travel in past month (e.g., car, bus, train, plane; with trip lasting 6 or more hours)   Negative: History of prior 'blood clot' in leg or lungs (i.e., deep vein thrombosis, pulmonary embolism)   Negative: History of inherited increased risk of blood clots (e.g., Factor 5 Leiden, Anti-thrombin 3, Protein C or Protein S deficiency, Prothrombin mutation)    Protocols used: Chest Pain-A-[place]

## 2025-07-23 NOTE — PROGRESS NOTES
Virtual Visit Details    Type of service:  Video Visit   Video Start Time: 1:18pm  Video End Time:1:57pm    Originating Location (pt. Location): Home  Distant Location (provider location):  Off-site  Platform used for Video Visit: [facility]    CLINICAL NUTRITION SERVICES - ASSESSMENT NOTE    Quiana Dunaway 89 year old referred for MNT related to cancer     Time Spent: 38 minutes  Visit Type: initial  Pt accompanied by: daughter in law Regi and granddaughter Mer  Referring Physician: Alex Parry MD  C90.00 (ICD-10-CM) - Multiple myeloma not having achieved remission (H)     NUTRITION HISTORY  Factors affecting nutrition intake include:decreased appetite, nausea, and chewing difficulties  Current diet/appetite: soft diet/ fair appetite  Chemotherapy: see plan below  Radiation: N/A    Diet Recall  Quiana eats 3 meals/ day. Family helps Quiana with meals. She is eating soft foods (only has 2 teeth) such as cooked cereals, hot dogs (cut up), mashed potatoes, pudding, smoothies, etc.  Breakfast Cream or wheat or oatmeal   Lunch Totz or scrambled eggs or Turkmen toast   Dinner Soup with chicken or hot dog or macaroni/ cheese   Snacks May have a cookie but does not typically snack   Beverages Diet coke, Snapple tea, does not like plain water       Treatment Plan:  Oncology History   Multiple myeloma not having achieved remission (H)   7/18/2017 Initial Diagnosis    Multiple myeloma not having achieved remission (H)     11/7/2022 - 11/7/2022 Chemotherapy    OP ONC Multiple Myeloma - Daratumumab Hyaluronidase (Darzalex Faspro) / LENalidomide / *Low Dose* Dexamethasone (>/=75 years old or BMI </=18.5)  Plan Provider: Alex Parry MD  Treatment goal: Palliative  Line of treatment: [No plan line of treatment]     11/7/2022 - 3/13/2023 Supportive Treatment    Oral ONC Multiple Myeloma LENalidomide + Dexamethasone  Plan Provider: Alex Parry MD  Treatment goal: Other  Line of treatment: [No plan line of treatment]    "  11/14/2022 - 7/1/2025 Chemotherapy    OP ONC Multiple Myeloma - Daratumumab Hyaluronidase (Darzalex Faspro)  Plan Provider: Alex Parry MD  Treatment goal: Palliative  Line of treatment: [No plan line of treatment]     6/12/2023 - 6/1/2025 Supportive Treatment    Oral ONC Multiple Myeloma Maintenance - Pomalidomide + Dexamethasone  Plan Provider: Alex Parry MD  Treatment goal: Other  Line of treatment: [No plan line of treatment]     7/29/2025 -  Chemotherapy    OP ONC Multiple Myeloma - Selinexor / Carfilzomib / Dexamethasone - Weekly  Plan Provider: Alex Parry MD  Treatment goal: Palliative  Line of treatment: [No plan line of treatment]       Treatment plan has been reviewed.    ANTHROPOMETRICS  Height:   Ht Readings from Last 1 Encounters:   07/07/25 1.549 m (5' 1\")     Weight:   Wt Readings from Last 1 Encounters:   07/07/25 59.9 kg (132 lb)     BMI: 24.94 kg/m2  Weight Status:  Normal BMI  Weight History: Quiana reports weight has typically been stable around 130 lbs. Fluctuations noted per chart review below.  Wt Readings from Last 10 Encounters:   07/07/25 59.9 kg (132 lb)   07/01/25 60.8 kg (134 lb)   06/04/25 61.7 kg (136 lb)   05/19/25 44.8 kg (98 lb 11.2 oz)   05/07/25 58.5 kg (129 lb)   04/15/25 59.2 kg (130 lb 9.6 oz)   04/08/25 57.9 kg (127 lb 9.6 oz)   04/08/25 57.9 kg (127 lb 9.6 oz)   03/13/25 55.8 kg (123 lb)   03/03/25 55.8 kg (123 lb)       Dosing Weight: 60 kg (current weight)    Medications/vitamins/minerals/herbals:   Reviewed    Labs:   Labs reviewed    ASSESSED NUTRITION NEEDS:  Estimated Energy Needs: 8300-0329 kcals (25-30 Kcal/Kg)  Justification: maintenance  Estimated Protein Needs: 60-72 grams protein (1-1.2 g pro/Kg)  Justification: increased needs  Estimated Fluid Needs: 4485-8392 mL   Justification: maintenance    MALNUTRITION:  % Weight Loss:  None noted  % Intake:  Decreased intake does not meet criteria for malnutrition (slight decrease in intake)  Subcutaneous Fat " Loss:  None observed  Muscle Loss:  None observed  Fluid Retention:  None noted    Malnutrition Diagnosis: Patient does not meet two of the above criteria necessary for diagnosing malnutrition    NUTRITION DIAGNOSIS:  Predicted inadequate nutrient intake related to potential for decreased ability to consume sufficient energy due to side effects of cancer therapy as evidenced by anticipated intake less than estimated needs.    INTERVENTIONS  Provided written & verbal education:     - Discussed nutrition and hydration needs. Encouraged pt to aim for at least 1500 kcal and 60-70 g protein. Encouraged drinking non-caffeine containing beverages (water/electrolytes) daily.    - Discussed strategies to help fortify meals and snacks. Encouraged to focus on small, frequent meals.    - Reviewed sources of protein. Encouraged to have a protein source with each meal and snack.    - Reviewed common barriers to eating with cancer treatment.  Discussed ways to cope with low appetite and nausea.   - Reviewed high calorie/high protein oral nutrition supplement options. Encouraged utilizing these ONS in home made shakes/smoothies to prevent flavor fatigue.      Provided pt with corresponding education materials/handouts on:  Tips for Adding Protein, Tips for Increasing Calories, Soft and Moist Protein Foods, Protein Content of Foods, High Calorie High Protein Recipes, Tips for Hydration    Pt verbalize understanding of materials provided during consult.   Patient Understanding: Excellent  Expected patient engagement: Excellent     Goals  1. Aim for 5-6 small frequent meals  2. Aim for 1500 kcal and 60 g protein, 6-8 fluids/day  3. Weight maintenance     Follow-Up Plans: Pt has RD contact information for questions.      MONITORING AND EVALUATION:  -Food/beverage intake  -Weight trends      Ángela Weaver, MS, RD, LD

## 2025-07-23 NOTE — TELEPHONE ENCOUNTER
Triage huddled with Chika ADS provider.  Provider advised ED evaluation today.  Patient was informed of provider's advice.  She verbalized understanding and agreement with plan.

## 2025-07-23 NOTE — ED TRIAGE NOTES
Patient comes in with her family for some chest pain.  She reports that she is getting over a several week cough that she was treated with antibiotics for.  Today she woke up with the chest pain.  The pain is unchanged with activity.  Pain is substernal.

## 2025-07-23 NOTE — ED NOTES
Pt ambulated to restroom with walker independently with a steady gait. Pt returned to room, rails placed up, placed back on telemonitor and provided call light.   
Pt presents to room denying chest pain and SOB. Pt states the chest pain is intermittent. Pt reports 0/10 pain. PT states the chest pain has been happening on and off for the last 2 days. Pt denies radiating pain when there is chest pain.   
Quality 431: Preventive Care And Screening: Unhealthy Alcohol Use - Screening: Patient not identified as an unhealthy alcohol user when screened for unhealthy alcohol use using a systematic screening method
Detail Level: Detailed
Quality 402: Tobacco Use And Help With Quitting Among Adolescents: Patient screened for tobacco and never smoked

## 2025-07-23 NOTE — LETTER
7/23/2025      Quiana Dunaway  [address]  [facility] [address]      Dear Colleague,    Thank you for referring your patient, Quiana Dunaway, to the [facility]. Please see a copy of my visit note below.    Virtual Visit Details    Type of service:  Video Visit   Video Start Time: 1:18pm  Video End Time:1:57pm    Originating Location (pt. Location): Home  Distant Location (provider location):  Off-site  Platform used for Video Visit: Well    CLINICAL NUTRITION SERVICES - ASSESSMENT NOTE    Quiana Dunaway 89 year old referred for MNT related to cancer     Time Spent: 38 minutes  Visit Type: initial  Pt accompanied by: daughter in law Regi and granddaughter Mer  Referring Physician: Alex Parry MD  C90.00 (ICD-10-CM) - Multiple myeloma not having achieved remission (H)     NUTRITION HISTORY  Factors affecting nutrition intake include:decreased appetite, nausea, and chewing difficulties  Current diet/appetite: soft diet/ fair appetite  Chemotherapy: see plan below  Radiation: N/A    Diet Recall  Quiana eats 3 meals/ day. Family helps Quiana with meals. She is eating soft foods (only has 2 teeth) such as cooked cereals, hot dogs (cut up), mashed potatoes, pudding, smoothies, etc.  Breakfast Cream or wheat or oatmeal   Lunch [place] or scrambled eggs or Hungarian toast   Dinner Soup with chicken or hot dog or macaroni/ cheese   Snacks May have a cookie but does not typically snack   Beverages Diet coke, Snapple tea, does not like plain water       Treatment Plan:  Oncology History   Multiple myeloma not having achieved remission (H)   7/18/2017 Initial Diagnosis    Multiple myeloma not having achieved remission (H)     11/7/2022 - 11/7/2022 Chemotherapy    OP ONC Multiple Myeloma - Daratumumab Hyaluronidase (Darzalex Faspro) / LENalidomide / *Low Dose* Dexamethasone (>/=75 years old or BMI </=18.5)  Plan Provider: Alex Parry MD  Treatment goal: Palliative  Line  "of treatment: [No plan line of treatment]     11/7/2022 - 3/13/2023 Supportive Treatment    Oral ONC Multiple Myeloma LENalidomide + Dexamethasone  Plan Provider: Alex Parry MD  Treatment goal: Other  Line of treatment: [No plan line of treatment]     11/14/2022 - 7/1/2025 Chemotherapy    OP ONC Multiple Myeloma - Daratumumab Hyaluronidase (Darzalex Faspro)  Plan Provider: Alex Parry MD  Treatment goal: Palliative  Line of treatment: [No plan line of treatment]     6/12/2023 - 6/1/2025 Supportive Treatment    Oral ONC Multiple Myeloma Maintenance - Pomalidomide + Dexamethasone  Plan Provider: Alex Parry MD  Treatment goal: Other  Line of treatment: [No plan line of treatment]     7/29/2025 -  Chemotherapy    OP ONC Multiple Myeloma - Selinexor / Carfilzomib / Dexamethasone - Weekly  Plan Provider: Alex Parry MD  Treatment goal: Palliative  Line of treatment: [No plan line of treatment]       Treatment plan has been reviewed.    ANTHROPOMETRICS  Height:   Ht Readings from Last 1 Encounters:   07/07/25 1.549 m (5' 1\")     Weight:   Wt Readings from Last 1 Encounters:   07/07/25 59.9 kg (132 lb)     BMI: 24.94 kg/m2  Weight Status:  Normal BMI  Weight History: Quiana reports weight has typically been stable around 130 lbs. Fluctuations noted per chart review below.  Wt Readings from Last 10 Encounters:   07/07/25 59.9 kg (132 lb)   07/01/25 60.8 kg (134 lb)   06/04/25 61.7 kg (136 lb)   05/19/25 44.8 kg (98 lb 11.2 oz)   05/07/25 58.5 kg (129 lb)   04/15/25 59.2 kg (130 lb 9.6 oz)   04/08/25 57.9 kg (127 lb 9.6 oz)   04/08/25 57.9 kg (127 lb 9.6 oz)   03/13/25 55.8 kg (123 lb)   03/03/25 55.8 kg (123 lb)       Dosing Weight: 60 kg (current weight)    Medications/vitamins/minerals/herbals:   Reviewed    Labs:   Labs reviewed    ASSESSED NUTRITION NEEDS:  Estimated Energy Needs: 2765-6507 kcals (25-30 Kcal/Kg)  Justification: maintenance  Estimated Protein Needs: 60-72 grams protein (1-1.2 g " pro/Kg)  Justification: increased needs  Estimated Fluid Needs: 9154-5583 mL   Justification: maintenance    MALNUTRITION:  % Weight Loss:  None noted  % Intake:  Decreased intake does not meet criteria for malnutrition (slight decrease in intake)  Subcutaneous Fat Loss:  None observed  Muscle Loss:  None observed  Fluid Retention:  None noted    Malnutrition Diagnosis: Patient does not meet two of the above criteria necessary for diagnosing malnutrition    NUTRITION DIAGNOSIS:  Predicted inadequate nutrient intake related to potential for decreased ability to consume sufficient energy due to side effects of cancer therapy as evidenced by anticipated intake less than estimated needs.    INTERVENTIONS  Provided written & verbal education:     - Discussed nutrition and hydration needs. Encouraged pt to aim for at least 1500 kcal and 60-70 g protein. Encouraged drinking non-caffeine containing beverages (water/electrolytes) daily.    - Discussed strategies to help fortify meals and snacks. Encouraged to focus on small, frequent meals.    - Reviewed sources of protein. Encouraged to have a protein source with each meal and snack.    - Reviewed common barriers to eating with cancer treatment.  Discussed ways to cope with low appetite and nausea.   - Reviewed high calorie/high protein oral nutrition supplement options. Encouraged utilizing these ONS in home made shakes/smoothies to prevent flavor fatigue.      Provided pt with corresponding education materials/handouts on:  Tips for Adding Protein, Tips for Increasing Calories, Soft and Moist Protein Foods, Protein Content of Foods, High Calorie High Protein Recipes, Tips for Hydration    Pt verbalize understanding of materials provided during consult.   Patient Understanding: Excellent  Expected patient engagement: Excellent     Goals  1. Aim for 5-6 small frequent meals  2. Aim for 1500 kcal and 60 g protein, 6-8 fluids/day  3. Weight maintenance     Follow-Up Plans: Pt  has RD contact information for questions.      MONITORING AND EVALUATION:  -Food/beverage intake  -Weight trends      Ángela Weaver, MS, RD, LD        Again, thank you for allowing me to participate in the care of your patient.        Sincerely,        Eugenia Weaver RD    Electronically signed

## 2025-07-23 NOTE — ED PROVIDER NOTES
Emergency Department Note      History of Present Illness     Chief Complaint   Chest Pain      JUSTINO Dunaway is a 89 year old female with multiple myeloma diagnosed 8 years ago, and a a past medical history significant for a-fib, GERD, hypertension and sick sinus syndrome s/p pacemaker who present with her daughter in law here for evaluation of chest pain. Patient reports onset of mid-sternal chest pain yesterday at rest when laying in bed. Pain has been intermittent, each episode of pain lasts a few minutes. She describes the pain as dull and rates the pain as a 1 or 2 out of 10. Denies any sharp pain. Denies any history of chest pain or CAD. She reports having a bad cough over the past 3 weeks that has just resolved today. She feels like the chest pain is f likely rom the cough. Exertion or deep breaths do not exacerbate the pain. She is in bed most of the day. She has not tried any pain medications. No recent fevers. No runny nose, congestion. She has shortness of breath at baseline due to multiple myeloma, has not increased recently. She had some nausea this morning. No vomiting. No abdominal pain. No new leg swelling. No history of heart failure. No history of lung problems. No history of blood clots. Patient lives with her daughter in law, according to her she is scheduled for a port placement in two days for her multiple myeloma, as she is starting a new round treatments next week. No family history of cardiac disease or early cardiac death. Denies cigarette or drug use.     Independent Historian   Daughter as detailed above.    Review of External Notes   I reviewed the nurse triage note regarding chest pain and virtual visit with oncology today- discussed ongoing treatment plan for multiple myeloma, not in remission: patient had chemotherapy in November of 2022, supportive   Reviewed office visit from 7/7/25     Past Medical History     Medical History and Problem List    Anxiety  Hypertension  Multiple myeloma   Pancytopenia   A-fib   Toxic multinodular goiter   Vertigo   Sick sinus syndrome   GERD     Medications   Zovirax   Calcium carbonate  Lexapro   Cyanocobalamin   Lasix   Duoneb   Remeron  Tapazole   Zyprexa   Aspirin 81 mg   Tessalon     Surgical History   Pacemaker implant   Cataract removal     Physical Exam     Patient Vitals for the past 24 hrs:   BP Temp Temp src Pulse Resp SpO2   07/23/25 2020 -- -- -- 69 22 --   07/23/25 2000 (!) 146/100 -- -- 86 (!) 36 95 %   07/23/25 1915 (!) 152/67 -- -- 69 23 --   07/23/25 1800 (!) 170/81 -- -- 66 23 97 %   07/23/25 1730 (!) 169/82 -- -- 62 19 --   07/23/25 1715 (!) 165/57 -- -- 68 19 96 %   07/23/25 1645 (!) 153/69 -- -- 72 (!) 33 98 %   07/23/25 1543 112/71 99.4  F (37.4  C) Temporal 92 16 95 %     Physical Exam  Constitutional:       General: Not in acute distress.     Appearance: Normal appearance.   HENT:      Head: Normocephalic and atraumatic.   Eyes:      Extraocular Movements: Extraocular movements intact.      Conjunctiva/sclera: Conjunctivae normal.   Cardiovascular:      Rate and Rhythm: Regular rate and rhythm.  Pulmonary:      Effort: Pulmonary effort is normal. No respiratory distress.      Breath sounds: Normal breath sounds.  Abdominal:      General: Abdomen is flat. There is no distension.      Palpations: Abdomen is soft.      Tenderness: There is no abdominal tenderness.   Musculoskeletal:      Cervical back: Normal range of motion. No rigidity.      Right lower leg: No edema.      Left lower leg: No edema.   Skin:     General: Skin is warm and dry.   Neurological:      General: No focal deficit present.      Mental Status: Alert and oriented to person, place, and time.   Psychiatric:         Mood and Affect: Mood normal.         Behavior: Behavior normal.    Diagnostics     Lab Results   Labs Ordered and Resulted from Time of ED Arrival to Time of ED Departure   TROPONIN T, HIGH SENSITIVITY - Abnormal        Result Value    Troponin T, High Sensitivity 16 (*)    BASIC METABOLIC PANEL (LIMITED OCCURRENCES) - Abnormal    Sodium 141      Potassium 3.4      Chloride 108 (*)     Carbon Dioxide (CO2) 24      Anion Gap 9      Urea Nitrogen 26.2 (*)     Creatinine 0.63      GFR Estimate 84      Calcium 9.1      Glucose 97     CBC WITH PLATELETS AND DIFFERENTIAL - Abnormal    WBC Count 11.5 (*)     RBC Count 3.58 (*)     Hemoglobin 10.4 (*)     Hematocrit 31.2 (*)     MCV 87      MCH 29.1      MCHC 33.3      RDW 15.9 (*)     Platelet Count 179      % Neutrophils 58      % Lymphocytes 24      % Monocytes 16      % Eosinophils 2      % Basophils 0      % Immature Granulocytes 1      NRBCs per 100 WBC 0      Absolute Neutrophils 6.6      Absolute Lymphocytes 2.7      Absolute Monocytes 1.9 (*)     Absolute Eosinophils 0.2      Absolute Basophils 0.0      Absolute Immature Granulocytes 0.1      Absolute NRBCs 0.0     RBC AND PLATELET MORPHOLOGY - Abnormal    RBC Morphology Confirmed RBC Indices      Platelet Assessment        Value: Automated Count Confirmed. Platelet morphology is normal.    Elliptocytes Moderate (*)    TROPONIN T, HIGH SENSITIVITY - Abnormal    Troponin T, High Sensitivity 21 (*)    D DIMER QUANTITATIVE - Abnormal    D-Dimer Quantitative 1.32 (*)        Imaging   CT Chest Pulmonary Embolism w Contrast   Final Result   IMPRESSION:   1.  No evidence of pulmonary embolus or other acute abnormality in the chest.          EKG   ECG results from 07/23/25   EKG 12 lead     Value    Systolic Blood Pressure     Diastolic Blood Pressure     Ventricular Rate 65    Atrial Rate 65    IN Interval 198    QRS Duration 148        QTc 503    P Axis 98    R AXIS -47    T Axis 73    Interpretation ECG      AV dual-paced rhythm  Abnormal ECG    Reviewed by YUE SPENCER DO  See ED course for independent interpretation.       *Note: Due to a large number of results and/or encounters for the requested time period, some results have  not been displayed. A complete set of results can be found in Results Review.         Independent Interpretation   None    ED Course      Medications Administered   Medications   sodium chloride 0.9 % bag for CT scan flush (84 mLs Intravenous $Given 7/23/25 1921)   iopamidol (ISOVUE-370) solution 57 mL (57 mLs Intravenous $Given 7/23/25 1921)       Procedures   Procedures     Discussion of Management   None    ED Course   ED Course as of 07/23/25 2113   Wed Jul 23, 2025   1653 Patient denying chest pain    1706 I obtained history and examined the patient as noted above.    1838 Troponin T, High Sensitivity(!): 21  Delta of 5   1939 EKG 12 lead  AV dual paced rhythm.  Rate of 65.  .  .  QTc 503.  No acute ST elevation or depression as compared with 2/18/2025 EKG qualifying for Sgarbossa's criteria.       Additional Documentation  None    Medical Decision Making / Diagnosis     CMS Diagnoses: None    MIPS   CT for PE was ordered because the patient had an abnormal d-dimer.           SOFYA Dunaway is a 89 year old female as described above presents to the emergency department for sternal chest pain that is intermittent in nature and nonexertional.  Patient has no shortness of breath component with this.  Recent upper respiratory tract infection for which patient endorse significant amount of coughing, but that has since resolved.  Patient is convinced that her symptoms is likely secondary from coughing.  Differential diagnosis considered includes, but not limited to, ACS, PE, dissection, pneumonia, and pneumothorax.  Will obtain D-dimer for screening for PE and indirect evaluation for dissection.  Chest imaging modality pending D-dimer results.  Chest pain workup.  Discussed care plan with patient and daughter-in-law at bedside who voiced understanding and agreement with plan.  Answered all questions.  Additional workup and orders as listed in chart.    Ultimately, work up shows no evidence of  ACS.  2 set troponin delta less than 7 suggestive against ACS per facility guidelines.  EKG unremarkable.  Patient remains chest pain-free in the ER.  D-dimer did result elevated at 1.32.  CT PE chest negative for pulmonary embolism and no obvious evidence of dissection, though contrast timing limited.  Nonetheless, patient remained chest pain-free and requesting discharge home.  Will have patient follow-up closely outpatient with PCP.  Discussed strict return precautions.  Answered all questions.  Patient and family voiced understanding and agreement with plan and comfortable with discharge home.    Please refer to ED course above as part of continuation of MDM for details on the patient's treatment course and any potential changes or updates beyond my initial evaluation and MDM creation.    Disposition   The patient was discharged.     Diagnosis     ICD-10-CM    1. Chest pain, unspecified type  R07.9            Discharge Medications   Discharge Medication List as of 7/23/2025  8:13 PM            Scribe Disclosure:  I, Eugenia López, am serving as a scribe at 5:08 PM on 7/23/2025 to document services personally performed by Kishan Barcenas DO based on my observations and the provider's statements to me.        Kishan Barcenas DO  07/23/25 [id number]

## 2025-07-24 ENCOUNTER — TELEPHONE (OUTPATIENT)
Dept: INTERVENTIONAL RADIOLOGY/VASCULAR | Facility: CLINIC | Age: 89
End: 2025-07-24
Payer: MEDICARE

## 2025-07-24 NOTE — DISCHARGE INSTRUCTIONS
Please follow-up with your primary care provider regarding your visit to the ER today.  You will need an ER follow-up for further discuss potential need for additional cardiac testing if appropriate.    Please return to the emergency department should you experience any of the symptoms we specifically discussed, including but not limited to recurrence or worsening of your symptoms, or development of any new and concerning symptoms such as fever, shortness of breath, exertional chest pain, lightheadedness, or dizziness.

## 2025-07-25 ENCOUNTER — APPOINTMENT (OUTPATIENT)
Dept: INTERVENTIONAL RADIOLOGY/VASCULAR | Facility: CLINIC | Age: 89
End: 2025-07-25
Attending: INTERNAL MEDICINE
Payer: MEDICARE

## 2025-07-25 ENCOUNTER — HOSPITAL ENCOUNTER (OUTPATIENT)
Facility: CLINIC | Age: 89
Discharge: HOME | End: 2025-07-25
Admitting: RADIOLOGY
Payer: MEDICARE

## 2025-07-25 VITALS
WEIGHT: 130 LBS | HEIGHT: 62 IN | OXYGEN SATURATION: 91 % | DIASTOLIC BLOOD PRESSURE: 45 MMHG | TEMPERATURE: 98.1 F | BODY MASS INDEX: 23.92 KG/M2 | SYSTOLIC BLOOD PRESSURE: 128 MMHG | HEART RATE: 62 BPM | RESPIRATION RATE: 10 BRPM

## 2025-07-25 DIAGNOSIS — C90.00 MULTIPLE MYELOMA NOT HAVING ACHIEVED REMISSION (H): ICD-10-CM

## 2025-07-25 PROCEDURE — 76937 US GUIDE VASCULAR ACCESS: CPT

## 2025-07-25 PROCEDURE — C1769 GUIDE WIRE: HCPCS

## 2025-07-25 PROCEDURE — 250N000011 HC RX IP 250 OP 636: Performed by: RADIOLOGY

## 2025-07-25 PROCEDURE — 272N000116 HC CATH CR1

## 2025-07-25 PROCEDURE — 272N000196 HC ACCESSORY CR5

## 2025-07-25 PROCEDURE — 250N000009 HC RX 250: Performed by: PHYSICIAN ASSISTANT

## 2025-07-25 PROCEDURE — C1788 PORT, INDWELLING, IMP: HCPCS

## 2025-07-25 PROCEDURE — 999N000163 HC STATISTIC SIMPLE TUBE INSERTION/CHARGE, PORT, CATH, FISTULOGRAM

## 2025-07-25 PROCEDURE — 258N000003 HC RX IP 258 OP 636: Performed by: PHYSICIAN ASSISTANT

## 2025-07-25 PROCEDURE — 250N000011 HC RX IP 250 OP 636: Performed by: PHYSICIAN ASSISTANT

## 2025-07-25 RX ORDER — LIDOCAINE 40 MG/G
CREAM TOPICAL
Status: DISCONTINUED | OUTPATIENT
Start: 2025-07-25 | End: 2025-07-25 | Stop reason: HOSPADM

## 2025-07-25 RX ORDER — NALOXONE HYDROCHLORIDE 0.4 MG/ML
0.4 INJECTION, SOLUTION INTRAMUSCULAR; INTRAVENOUS; SUBCUTANEOUS
Status: DISCONTINUED | OUTPATIENT
Start: 2025-07-25 | End: 2025-07-25 | Stop reason: HOSPADM

## 2025-07-25 RX ORDER — FLUMAZENIL 0.1 MG/ML
0.2 INJECTION, SOLUTION INTRAVENOUS
Status: DISCONTINUED | OUTPATIENT
Start: 2025-07-25 | End: 2025-07-25 | Stop reason: HOSPADM

## 2025-07-25 RX ORDER — HEPARIN SODIUM,PORCINE 10 UNIT/ML
5-10 VIAL (ML) INTRAVENOUS EVERY 24 HOURS
Status: DISCONTINUED | OUTPATIENT
Start: 2025-07-25 | End: 2025-07-25 | Stop reason: HOSPADM

## 2025-07-25 RX ORDER — NALOXONE HYDROCHLORIDE 0.4 MG/ML
0.2 INJECTION, SOLUTION INTRAMUSCULAR; INTRAVENOUS; SUBCUTANEOUS
Status: DISCONTINUED | OUTPATIENT
Start: 2025-07-25 | End: 2025-07-25 | Stop reason: HOSPADM

## 2025-07-25 RX ORDER — HEPARIN SODIUM (PORCINE) LOCK FLUSH IV SOLN 100 UNIT/ML 100 UNIT/ML
500 SOLUTION INTRAVENOUS
Status: COMPLETED | OUTPATIENT
Start: 2025-07-25 | End: 2025-07-25

## 2025-07-25 RX ORDER — HEPARIN SODIUM,PORCINE 10 UNIT/ML
5-10 VIAL (ML) INTRAVENOUS
Status: DISCONTINUED | OUTPATIENT
Start: 2025-07-25 | End: 2025-07-25 | Stop reason: HOSPADM

## 2025-07-25 RX ORDER — HEPARIN SODIUM (PORCINE) LOCK FLUSH IV SOLN 100 UNIT/ML 100 UNIT/ML
5-10 SOLUTION INTRAVENOUS
Status: DISCONTINUED | OUTPATIENT
Start: 2025-07-25 | End: 2025-07-25 | Stop reason: HOSPADM

## 2025-07-25 RX ORDER — SODIUM CHLORIDE 9 MG/ML
INJECTION, SOLUTION INTRAVENOUS CONTINUOUS
Status: DISCONTINUED | OUTPATIENT
Start: 2025-07-25 | End: 2025-07-25 | Stop reason: HOSPADM

## 2025-07-25 RX ORDER — FENTANYL CITRATE 50 UG/ML
25-50 INJECTION, SOLUTION INTRAMUSCULAR; INTRAVENOUS EVERY 5 MIN PRN
Status: DISCONTINUED | OUTPATIENT
Start: 2025-07-25 | End: 2025-07-25 | Stop reason: HOSPADM

## 2025-07-25 RX ORDER — CEFAZOLIN SODIUM 2 G/50ML
2 SOLUTION INTRAVENOUS
Status: COMPLETED | OUTPATIENT
Start: 2025-07-25 | End: 2025-07-25

## 2025-07-25 RX ORDER — ACETAMINOPHEN 325 MG/1
325 TABLET ORAL
Status: DISCONTINUED | OUTPATIENT
Start: 2025-07-25 | End: 2025-07-25 | Stop reason: HOSPADM

## 2025-07-25 RX ADMIN — MIDAZOLAM 0.5 MG: 1 INJECTION INTRAMUSCULAR; INTRAVENOUS at 11:47

## 2025-07-25 RX ADMIN — LIDOCAINE HYDROCHLORIDE 10 ML: 10; .005 INJECTION, SOLUTION EPIDURAL; INFILTRATION; INTRACAUDAL; PERINEURAL at 11:53

## 2025-07-25 RX ADMIN — LIDOCAINE HYDROCHLORIDE 6 ML: 10 INJECTION, SOLUTION INFILTRATION; PERINEURAL at 11:53

## 2025-07-25 RX ADMIN — SODIUM CHLORIDE: 0.9 INJECTION, SOLUTION INTRAVENOUS at 10:41

## 2025-07-25 RX ADMIN — CEFAZOLIN SODIUM 2 G: 2 SOLUTION INTRAVENOUS at 10:59

## 2025-07-25 RX ADMIN — MIDAZOLAM 0.5 MG: 1 INJECTION INTRAMUSCULAR; INTRAVENOUS at 11:42

## 2025-07-25 RX ADMIN — HEPARIN SODIUM (PORCINE) LOCK FLUSH IV SOLN 100 UNIT/ML 500 UNITS: 100 SOLUTION at 11:53

## 2025-07-25 RX ADMIN — FENTANYL CITRATE 25 MCG: 50 INJECTION, SOLUTION INTRAMUSCULAR; INTRAVENOUS at 11:43

## 2025-07-25 ASSESSMENT — ACTIVITIES OF DAILY LIVING (ADL)
ADLS_ACUITY_SCORE: 57

## 2025-07-25 NOTE — IR NOTE
Interventional Radiology Intra-procedural Nursing Note    Patient Name: Quiana Dunaway  Medical Record Number: [id number]  Today's Date: July 25, 2025    Procedure: port placement with moderate sedation  Start time: 1144  End time: 1200  Report provided to: care suites RN  Patient depart time and location: 1205 to CS19    Note: Patient entered Interventional Radiology Suite number 1 via cart. Patient awake, alert and oriented. Assisted onto procedural table in supine position. Prepped and draped.  Dr. Chu in room. Time out and procedure started. Vital signs stable. Telemetry reading sinus, paced rhythm.    Procedure well tolerated by patient without complications. Procedure end with debrief by Dr. Chu.  Glue, tegaderm/gauze applied to applied to right chest interventional procedure access site.    Administered medication totals:  Lidocaine 1% 6 mL Intradermal  Lidocaine with Epinephrine 2% 10 mL Intradermal  Heparin 500 units Port  Versed 1 mg IVP  Fentanyl 25 mcg IVP    Last dose of sedation administered at 1148.

## 2025-07-25 NOTE — IR NOTE
Procedure Note for IR Procedure Dictation  Procedure IR Chest Port Placement  Conscious sedation: 1 mg IVP Versed, 25 mcg IVP fentanyl  Sedation time: 14 minutes  Fluoro time: 0.9 minutes  Total Fluoro Dose: 7.57 mGy (Air Kerma)  Contrast: 0 ml 0  Local anesthetic: 6 ml 1% lidocaine, 10 ml 1% lidocaine with Epi

## 2025-07-25 NOTE — PRE-PROCEDURE
GENERAL PRE-PROCEDURE:   Procedure:  Port a catheter placement with moderate sedation  Date/Time:  7/25/2025 11:24 AM    Written consent obtained?: Yes    Risks and benefits: Risks, benefits and alternatives were discussed    Consent given by:  Patient  Patient states understanding of procedure being performed: Yes    Patient's understanding of procedure matches consent: Yes    Procedure consent matches procedure scheduled: Yes    Expected level of sedation:  Moderate  Appropriately NPO:  Yes  ASA Class:  3  Mallampati  :  Grade 3- soft palate visible, posterior pharyngeal wall not visible  Lungs:  Lungs clear with good breath sounds bilaterally  Heart:  Normal heart sounds and rate  History & Physical reviewed:  History and physical reviewed and no updates needed  Statement of review:  I have reviewed the lab findings, diagnostic data, medications, and the plan for sedation    Total time: 20 minutes    Thanks [facility] Interventional Radiology CNP ([phone]) (phone [phone])

## 2025-07-25 NOTE — PROGRESS NOTES
Care Suites Post Procedure Note    Patient Information  Name: Quiana Dunaway  Age: 89 year old    Post Procedure  Time patient returned to Care Suites: 1215  Concerns/abnormal assessment: na  If abnormal assessment, provider notified: No  Plan/Other: Continue to monitor.      Mery Martinez RN

## 2025-07-25 NOTE — DISCHARGE INSTRUCTIONS
Port Insertion Discharge Instructions     After you go home:    Have an adult stay with you for the first 6 hours  You may resume your normal diet       For 24 hours - due to the sedation you received:  Relax and take it easy  Do NOT make any important or legal decisions  Do NOT drive or operate machines at home or at work  Do NOT drink alcohol    Care of Incision sites:    You have a liquid adhesive covering on your incisions. Do not remove the adhesive residue. It will come off on it's own.  You may take a shower 24-48 hours after your procedure. Do not scrub site.  No submerging site for 2 weeks or until the incision is completely healed.  You may cover the wound with a bandaid if needed for comfort.      Activity     Avoid heavy lifting (greater than 10 pounds) or the overuse of your shoulder for 48-72 hrs.    Bleeding:    If you start bleeding from the incision sites in your chest or neck - or have swelling in your neck, sit down and press on the site for 5-10 minutes.   If bleeding has not stopped after 10 minutes, call your provider.        Call 911 right away if you have heavy bleeding or bleeding that does not stop.      Medicines:    You may resume all medications  Resume your Warfarin/Coumadin at your regular dose today. Follow up with your provider to have your INR rechecked  Resume your Platelet Inhibitors and Aspirin tomorrow at your regular dose  For minor pain, you may take Acetaminophen (Tylenol) or Ibuprofen (Advil)    Call the provider who ordered this procedure if:    You have swelling in your neck or over your port site  The incision area is red, swollen, hot or tender  You have chills or a fever greater than 101 F (38 C)  Any questions or concerns    Call  911 or go to the Emergency Room if you have:    Severe chest pain or trouble breathing  Bleeding that you cannot control    Additional Information:    Your port may be accessed right away.   You will need to have your port flushed every 30  days or after each use.      If you have questions call:        Interventional Radiology Intake Center @ [phone]    Mon - Fri  7:30 am - 4 pm          Calls will be returned on the next business day  If you need immediate assistance - please be seen   in an Urgent Care or Emergency Department    You may also contact your provider via My Chart

## 2025-07-25 NOTE — PROGRESS NOTES
Care Suites Admission Nursing Note    Patient Information  Name: Quiana Dunaway  Age: 89 year old  Reason for admission: Port placement   Care Suites arrival time: 0945    Visitor Information  Name: Regi      Patient Admission/Assessment   Pre-procedure assessment complete: Yes  If abnormal assessment/labs, provider notified: N/A  NPO: Yes  Medications held per instructions/orders: N/A  Consent: obtained  If applicable, pregnancy test status: deferred   Patient oriented to room: Yes  Education/questions answered: Yes  Plan/other: per plan     Discharge Planning  Discharge name/phone number: Regi [phone]   Overnight post sedation caregiver: Regi  Discharge location: [place]    Mery Martinez RN

## 2025-07-25 NOTE — PROGRESS NOTES
Care Suites Discharge Nursing Note    Patient Information  Name: Quiana Dunaway  Age: 89 year old    Discharge Education:  Discharge instructions reviewed: Yes  Additional education/resources provided: yes  Patient/patient representative verbalizes understanding: Yes  Patient discharging on new medications: Yes  Medication education completed: N/A    Discharge Plans:   Discharge location: home  Discharge ride contacted: Yes  Approximate discharge time: 1315    Discharge Criteria:  Discharge criteria met and vital signs stable: Yes    Patient Belongs:  Patient belongings returned to patient: Yes    Mery Valdovinos RN

## 2025-07-28 ENCOUNTER — DOCUMENTATION ONLY (OUTPATIENT)
Dept: ONCOLOGY | Facility: CLINIC | Age: 89
End: 2025-07-28
Payer: MEDICARE

## 2025-07-29 ENCOUNTER — INFUSION THERAPY VISIT (OUTPATIENT)
Dept: INFUSION THERAPY | Facility: CLINIC | Age: 89
End: 2025-07-29
Attending: INTERNAL MEDICINE
Payer: MEDICARE

## 2025-07-29 ENCOUNTER — ONCOLOGY VISIT (OUTPATIENT)
Dept: ONCOLOGY | Facility: CLINIC | Age: 89
End: 2025-07-29
Attending: INTERNAL MEDICINE
Payer: MEDICARE

## 2025-07-29 VITALS
HEART RATE: 67 BPM | TEMPERATURE: 98 F | OXYGEN SATURATION: 93 % | BODY MASS INDEX: 23.92 KG/M2 | DIASTOLIC BLOOD PRESSURE: 79 MMHG | WEIGHT: 130.8 LBS | RESPIRATION RATE: 14 BRPM | SYSTOLIC BLOOD PRESSURE: 145 MMHG

## 2025-07-29 DIAGNOSIS — C90.00 MULTIPLE MYELOMA NOT HAVING ACHIEVED REMISSION (H): Primary | ICD-10-CM

## 2025-07-29 DIAGNOSIS — C90.00 MULTIPLE MYELOMA NOT HAVING ACHIEVED REMISSION (H): ICD-10-CM

## 2025-07-29 PROCEDURE — 250N000012 HC RX MED GY IP 250 OP 636 PS 637: Performed by: INTERNAL MEDICINE

## 2025-07-29 PROCEDURE — 82247 BILIRUBIN TOTAL: CPT | Performed by: INTERNAL MEDICINE

## 2025-07-29 PROCEDURE — 82040 ASSAY OF SERUM ALBUMIN: CPT | Performed by: INTERNAL MEDICINE

## 2025-07-29 PROCEDURE — 84165 PROTEIN E-PHORESIS SERUM: CPT | Mod: TC | Performed by: PATHOLOGY

## 2025-07-29 PROCEDURE — 84155 ASSAY OF PROTEIN SERUM: CPT | Performed by: INTERNAL MEDICINE

## 2025-07-29 PROCEDURE — 99215 OFFICE O/P EST HI 40 MIN: CPT | Performed by: INTERNAL MEDICINE

## 2025-07-29 PROCEDURE — 96413 CHEMO IV INFUSION 1 HR: CPT

## 2025-07-29 PROCEDURE — 83735 ASSAY OF MAGNESIUM: CPT | Performed by: INTERNAL MEDICINE

## 2025-07-29 PROCEDURE — G0463 HOSPITAL OUTPT CLINIC VISIT: HCPCS | Performed by: INTERNAL MEDICINE

## 2025-07-29 PROCEDURE — G2211 COMPLEX E/M VISIT ADD ON: HCPCS | Performed by: INTERNAL MEDICINE

## 2025-07-29 PROCEDURE — 258N000003 HC RX IP 258 OP 636: Performed by: INTERNAL MEDICINE

## 2025-07-29 PROCEDURE — 83521 IG LIGHT CHAINS FREE EACH: CPT | Performed by: INTERNAL MEDICINE

## 2025-07-29 PROCEDURE — 85025 COMPLETE CBC W/AUTO DIFF WBC: CPT | Performed by: INTERNAL MEDICINE

## 2025-07-29 PROCEDURE — 84165 PROTEIN E-PHORESIS SERUM: CPT | Mod: 26 | Performed by: PATHOLOGY

## 2025-07-29 PROCEDURE — 250N000011 HC RX IP 250 OP 636: Performed by: INTERNAL MEDICINE

## 2025-07-29 PROCEDURE — 84100 ASSAY OF PHOSPHORUS: CPT | Performed by: INTERNAL MEDICINE

## 2025-07-29 PROCEDURE — 82784 ASSAY IGA/IGD/IGG/IGM EACH: CPT | Performed by: INTERNAL MEDICINE

## 2025-07-29 RX ORDER — HEPARIN SODIUM (PORCINE) LOCK FLUSH IV SOLN 100 UNIT/ML 100 UNIT/ML
5 SOLUTION INTRAVENOUS
Status: DISCONTINUED | OUTPATIENT
Start: 2025-07-29 | End: 2025-07-29 | Stop reason: HOSPADM

## 2025-07-29 RX ORDER — DEXAMETHASONE 4 MG/1
40 TABLET ORAL WEEKLY
Qty: 40 TABLET | Refills: 0 | Status: SHIPPED | OUTPATIENT
Start: 2025-07-29 | End: 2025-07-29

## 2025-07-29 RX ORDER — DEXAMETHASONE 4 MG/1
20 TABLET ORAL ONCE
Status: CANCELLED
Start: 2025-07-29 | End: 2025-07-29

## 2025-07-29 RX ORDER — DEXAMETHASONE 4 MG/1
20 TABLET ORAL WEEKLY
Qty: 20 TABLET | Refills: 0 | Status: SHIPPED | OUTPATIENT
Start: 2025-07-29

## 2025-07-29 RX ORDER — DEXAMETHASONE 4 MG/1
40 TABLET ORAL ONCE
Status: CANCELLED
Start: 2025-07-29 | End: 2025-07-29

## 2025-07-29 RX ORDER — DEXAMETHASONE 4 MG/1
20 TABLET ORAL ONCE
Status: COMPLETED | OUTPATIENT
Start: 2025-07-29 | End: 2025-07-29

## 2025-07-29 RX ADMIN — DEXAMETHASONE 20 MG: 4 TABLET ORAL at 11:22

## 2025-07-29 RX ADMIN — Medication 5 ML: at 13:00

## 2025-07-29 RX ADMIN — CARFILZOMIB 30 MG: 30 INJECTION, POWDER, LYOPHILIZED, FOR SOLUTION INTRAVENOUS at 11:58

## 2025-07-29 RX ADMIN — DEXTROSE 500 ML: 5 SOLUTION INTRAVENOUS at 11:25

## 2025-07-29 ASSESSMENT — PAIN SCALES - GENERAL: PAINLEVEL_OUTOF10: NO PAIN (0)

## 2025-07-29 NOTE — LETTER
"7/29/2025      Quiana Dunaway  [address] [facility] [address]      Dear Colleague,    Thank you for referring your patient, Quiana Dunaway, to the [facility]CER [facility]. Please see a copy of my visit note below.    Oncology Rooming Note    July 29, 2025 10:19 AM   Quiana Dunaway is a 89 year old female who presents for:    Chief Complaint   Patient presents with     Oncology Clinic Visit     Initial Vitals: BP (!) 145/79   Pulse 67   Temp 98  F (36.7  C) (Oral)   Resp 14   Wt 59.3 kg (130 lb 12.8 oz)   LMP  (LMP Unknown)   SpO2 93%   BMI 23.92 kg/m   Estimated body mass index is 23.92 kg/m  as calculated from the following:    Height as of 7/25/25: 1.575 m (5' 2\").    Weight as of this encounter: 59.3 kg (130 lb 12.8 oz). Body surface area is 1.61 meters squared.  No Pain (0) Comment: Data Unavailable   No LMP recorded (lmp unknown). Patient is postmenopausal.  Allergies reviewed: Yes  Medications reviewed: Yes    Medications: Medication refills not needed today.  Pharmacy name entered into [facility]:    [facility] - [place], [place] - [address]  [id number] - [place], [place] - [address] AT [facility]    PHQ9:  Did this patient require a PHQ9?: No      Clinical concerns: no       Shari J. Schoenberger, [facility]              HEMATOLOGY HISTORY: Mrs. Dunaway is a lady with multiple myeloma (IgG kappa). High risk, t(14;16).     1.  Evaluated for cytopenia in 2017.  -On 06/21/2017, WBC of 3.3, hemoglobin 9.6 and platelet 149.  - On 05/01/2017, normal creatinine of 0.66 and normal calcium of 8.9.  -SPEP on 07/07/2017 revealed M-spike of 1.8.     2. Bone marrow biopsy on 07/12/2017 reveals kappa monotypic plasma cell population consistent with multiple myeloma.  Bone marrow is 70% cellular.  Plasma cell is 50-60%.     -Cytogenetics revealed multiple abnormalities.   -FISH: There is gain of chromosome 1, 5, 9, " 15, and 17.  There is translocation 14;16.      3.  On 07/18/2017:  -M-spike of 1.9.   -Immunofixation reveals monoclonal IgG kappa.    -IgG level of 2970.  IgA of 15 and IgM of 175.    -Kappa free light chain of 67.7.  Lambda free light chain of 1.42.  Ratio of kappa to lambda of 47.71.    -Beta 2 microglobulin of 3.4.      4. PET scan on 07/24/2017 reveals several focal areas of increased FDG uptake consistent with multiple myeloma.  There is probable epithelial cyst in tail of the pancreas.  No associated abnormal FDG activity.  Left thyroid cysts or nodules without any FDG activity.  There is a 0.3 cm left upper lobe lung nodule.      5.  Velcade, Revlimid and dexamethasone between on 08/14/2017 and 04/30/2018. Velcade stopped after 04/30/2018.   -Revlimid and dexamethasone continued.  -Revlimid held between 12/27/2021 and 03/08/2022.  -Daratumumab added on 11/14/2022.  -Changed to daratumumab, pomalidomide and dexamethasone on 06/12/2023.  Pomalidomide dose reduced due to side effects.  Pomalidomide reduced to 1 mg a day for 2 weeks on, 2 week off in March 2025.     6. 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     7. Hospitalized between 02/18/2025 and 02/21/2025 for influenza B causing bilateral pneumonia and hypoxemic respiratory failure.     8.  PET scan on 06/23/2025 reveals diffuse FDG uptake within the axial and appendicular skeleton.     9.  Patient evaluated at [facility].  Not a candidate for CAR-T therapy.     SUBJECTIVE:    Ms. Dunaway is an 89-year-old female with multiple myeloma which has progressed on multiple different agent.  Recently progressed on daratumumab, pomalidomide and dexamethasone.  Patient is here to start treatment with selinexor, carfilzomib and dexamethasone.    Patient was recently evaluated in the ER for chest pain.  It was noncardiac pain.    Overall she is doing well for her age.  Mild generalized weakness.  She is still working  few hours a day.  No headache.  She has chronic dizziness.  No chest pain.  No shortness of breath.  No abdominal pain.  No nausea or vomiting.  No bleeding.     Patient has dental problem. Because of that, we are holding Zometa.     PHYSICAL EXAMINATION:   Alert and oriented x 3.  Not in any respiratory distress.  Vitals: Reviewed.  Rest of the system is not examined.     LABORATORY: Reviewed.      ASSESSMENT:    1.  An 89-year-old female with multiple myeloma, progressing.   2.  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  3.  Intermittent thrombocytopenia.  4.  Chronic dizziness. Stable.  5.  Generalized weakness from myeloma, anemia, other medical problems and old age.    6.  Dental problem.     PLAN:  -Start carfilzomib, selinexor and dexamethasone.  -Continue to hold Zometa.  -See ROSA MARIA with next cycle.  -See me in 6-8 weeks.     DISCUSSION:  1.  Patient is overall doing well.  Discussed regarding myeloma.  She wants to continue on treatment for myeloma.    Will start her on combination of carfilzomib, selinexor and dexamethasone.  Dose modified because of her age and other medical problems.  She will get selinexor 40 mg once a week.  Dexamethasone will be 20 mg once a week.  Hopefully she tolerates them well.    Explained to the patient that if she progresses on current regimen, we will then plan on bispecific antibodies.  She has been evaluated for CAR-T.  She is not a CAR-T candidate.    2.  She had few questions which were all answered.  I will see her in 6 to 8 weeks.  In between she will see our ROSA MARIA.    Total visit time of 30 minutes. Time spent in today's video, review of chart/investigations today, monitoring for toxicity of high risk medications and documentation today.        Again, thank you for allowing me to participate in the care of your patient.        Sincerely,        Alex Parry MD    Electronically signed

## 2025-07-29 NOTE — PROGRESS NOTES
HEMATOLOGY HISTORY: Mrs. Dunaway is a lady with multiple myeloma (IgG kappa). High risk, t(14;16).     1.  Evaluated for cytopenia in 2017.  -On 06/21/2017, WBC of 3.3, hemoglobin 9.6 and platelet 149.  - On 05/01/2017, normal creatinine of 0.66 and normal calcium of 8.9.  -SPEP on 07/07/2017 revealed M-spike of 1.8.     2. Bone marrow biopsy on 07/12/2017 reveals kappa monotypic plasma cell population consistent with multiple myeloma.  Bone marrow is 70% cellular.  Plasma cell is 50-60%.     -Cytogenetics revealed multiple abnormalities.   -FISH: There is gain of chromosome 1, 5, 9, 15, and 17.  There is translocation 14;16.      3.  On 07/18/2017:  -M-spike of 1.9.   -Immunofixation reveals monoclonal IgG kappa.    -IgG level of 2970.  IgA of 15 and IgM of 175.    -Kappa free light chain of 67.7.  Lambda free light chain of 1.42.  Ratio of kappa to lambda of 47.71.    -Beta 2 microglobulin of 3.4.      4. PET scan on 07/24/2017 reveals several focal areas of increased FDG uptake consistent with multiple myeloma.  There is probable epithelial cyst in tail of the pancreas.  No associated abnormal FDG activity.  Left thyroid cysts or nodules without any FDG activity.  There is a 0.3 cm left upper lobe lung nodule.      5.  Velcade, Revlimid and dexamethasone between on 08/14/2017 and 04/30/2018. Velcade stopped after 04/30/2018.   -Revlimid and dexamethasone continued.  -Revlimid held between 12/27/2021 and 03/08/2022.  -Daratumumab added on 11/14/2022.  -Changed to daratumumab, pomalidomide and dexamethasone on 06/12/2023.  Pomalidomide dose reduced due to side effects.  Pomalidomide reduced to 1 mg a day for 2 weeks on, 2 week off in March 2025.     6. 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     7. Hospitalized between 02/18/2025 and 02/21/2025 for influenza B causing bilateral pneumonia and hypoxemic respiratory failure.     8.  PET scan on 06/23/2025 reveals diffuse FDG uptake  within the axial and appendicular skeleton.     9.  Patient evaluated at [facility].  Not a candidate for CAR-T therapy.     SUBJECTIVE:    Ms. Dunaway is an 89-year-old female with multiple myeloma which has progressed on multiple different agent.  Recently progressed on daratumumab, pomalidomide and dexamethasone.  Patient is here to start treatment with selinexor, carfilzomib and dexamethasone.    Patient was recently evaluated in the ER for chest pain.  It was noncardiac pain.    Overall she is doing well for her age.  Mild generalized weakness.  She is still working few hours a day.  No headache.  She has chronic dizziness.  No chest pain.  No shortness of breath.  No abdominal pain.  No nausea or vomiting.  No bleeding.     Patient has dental problem. Because of that, we are holding Zometa.     PHYSICAL EXAMINATION:   Alert and oriented x 3.  Not in any respiratory distress.  Vitals: Reviewed.  Rest of the system is not examined.     LABORATORY: Reviewed.      ASSESSMENT:    1.  An 89-year-old female with multiple myeloma, progressing.   2.  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  3.  Intermittent thrombocytopenia.  4.  Chronic dizziness. Stable.  5.  Generalized weakness from myeloma, anemia, other medical problems and old age.    6.  Dental problem.     PLAN:  -Start carfilzomib, selinexor and dexamethasone.  -Continue to hold Zometa.  -See ROSA MARIA with next cycle.  -See me in 6-8 weeks.     DISCUSSION:  1.  Patient is overall doing well.  Discussed regarding myeloma.  She wants to continue on treatment for myeloma.    Will start her on combination of carfilzomib, selinexor and dexamethasone.  Dose modified because of her age and other medical problems.  She will get selinexor 40 mg once a week.  Dexamethasone will be 20 mg once a week.  Hopefully she tolerates them well.    Explained to the patient that if she progresses on current regimen, we will then plan on bispecific  antibodies.  She has been evaluated for CAR-T.  She is not a CAR-T candidate.    2.  She had few questions which were all answered.  I will see her in 6 to 8 weeks.  In between she will see our ROSA MARIA.    Total visit time of 30 minutes. Time spent in today's video, review of chart/investigations today, monitoring for toxicity of high risk medications and documentation today.

## 2025-07-29 NOTE — PROGRESS NOTES
"Oncology Rooming Note    July 29, 2025 10:19 AM   Quiana Dunaway is a 89 year old female who presents for:    Chief Complaint   Patient presents with    Oncology Clinic Visit     Initial Vitals: BP (!) 145/79   Pulse 67   Temp 98  F (36.7  C) (Oral)   Resp 14   Wt 59.3 kg (130 lb 12.8 oz)   LMP  (LMP Unknown)   SpO2 93%   BMI 23.92 kg/m   Estimated body mass index is 23.92 kg/m  as calculated from the following:    Height as of 7/25/25: 1.575 m (5' 2\").    Weight as of this encounter: 59.3 kg (130 lb 12.8 oz). Body surface area is 1.61 meters squared.  No Pain (0) Comment: Data Unavailable   No LMP recorded (lmp unknown). Patient is postmenopausal.  Allergies reviewed: Yes  Medications reviewed: Yes    Medications: Medication refills not needed today.  Pharmacy name entered into [employer]:    [facility] - [place], [place] - [address]  [id number] - [place], [place] - [address] AT [facility]    PHQ9:  Did this patient require a PHQ9?: No      Clinical concerns: no       Shari J. Schoenberger, CMA            "

## 2025-07-29 NOTE — PATIENT INSTRUCTIONS
-Start carfilzomib, selinexor and dexamethasone.  -See ROSA MARIA with next cycle.  -See me in 6-8 weeks.

## 2025-07-29 NOTE — PROGRESS NOTES
Infusion Nursing Note:  Quiana Dunaway presents today for C1D1 Kyprolis.    Patient seen by provider today: Yes: Dr. Parry.   present during visit today: Not Applicable.    Note: Patient seen and assessed in clinic prior to infusion by Dr. Parry. Per Dr. Parry, ok to proceed with treatment today. Patient verbalizes understanding of correct dosing/schedule of oral dexamethasone and selinexor as ordered. Patient given first dose of oral dexamethasone in infusion today, and prescription filled for future treatments. Denies need for refill on selinexor, will take first dose at home this afternoon with an antiemetic 45-60 minutes prior. Zometa plan continues to be on hold.     New chemotherapy regimen today. Chemotherapy teaching, side effects, and schedule reviewed with patient. General and individual chemotherapy side effects reviewed with patient including fatigue, immunosuppression, nausea/vomiting, constipation/diarrhea and hypersensitivity reaction. Patient states she previously received printed info on new chemo drugs in clinic. Pt instructed to call care coordinator, triage (or MD on call if after hours/weekends) with chills/temp >=100.5, questions/concerns. Pt stated understanding of plan.       Intravenous Access:  Labs drawn without difficulty.  Implanted Port.    Treatment Conditions:  Lab Results   Component Value Date    HGB 10.1 (L) 07/29/2025    WBC 7.8 07/29/2025    ANEU 3.5 07/29/2025     07/29/2025        Lab Results   Component Value Date     07/29/2025    POTASSIUM 4.4 07/29/2025    MAG 1.9 07/29/2025    CR 0.66 07/29/2025    HUMBERTO 9.3 07/29/2025    BILITOTAL 0.3 07/29/2025    ALBUMIN 3.6 07/29/2025    ALT 7 07/29/2025    AST 14 07/29/2025     Results reviewed, labs MET treatment parameters, ok to proceed with treatment.      Post Infusion Assessment:  Patient tolerated infusion without incident.  Blood return noted pre and post infusion.  Site patent and intact, free from  02-Oct-2020 12:04 redness, edema or discomfort.  No evidence of extravasations.  Access discontinued per protocol.       Discharge Plan:   Prescription refills given for dexamethasone.  Discharge instructions reviewed with: Patient and Family.  Patient and/or family verbalized understanding of discharge instructions and all questions answered.  Copy of AVS reviewed with patient and/or family.  Patient will return 8/5/25 for next appointment.  Patient discharged in stable condition accompanied by: daughter-in-law.  Departure Mode: Ambulatory.      Amanda Howe RN     02-Oct-2020 11:03

## 2025-07-29 NOTE — ORAL ONC MGMT
Oral Chemotherapy Monitoring Program     Visited with patient Quiana and her DIL Regi in clinic in follow up of oral chemotherapy (Selinexor). We discussed Acyclovir, Olanzapine, Prochlorperazine, Ondansetron, Dexamethasone, and Selinexor in depth. Clarified weekly Dexamethasone to be 20 mg once week - sent new RX to [facility]. Suggested to pre treat Selinexor with Ondansetron 30-60 mins as emetogenicity can be moderate to high. Patient will be on Olanzapine 2.5 mg every evening. Coordinated plan with Dr. Parry, infusion nurse and patient/DIL.       Follow Up:  8/5: 1 week follow up call and lab appt    Neno Canales PharmD  [facility] Oral [facility]otherapy  [phone]  July 29, 2025

## 2025-07-30 ENCOUNTER — TELEPHONE (OUTPATIENT)
Dept: ONCOLOGY | Facility: CLINIC | Age: 89
End: 2025-07-30
Payer: MEDICARE

## 2025-07-30 NOTE — TELEPHONE ENCOUNTER
Oncology Nurse Triage    Situation:   Quiana reporting the following symptoms: thirsty and intermittent dizziness     Background:   Treating Provider:   Dr Parry     Date of last office visit: 7/29/2025    Recent Treatments:7/29/2025: C1D1 Kyprolis. Also started selinexor     Assessment:     Pt's daughter in law Regi and pt are calling. Reporting that pt has feels very thirsty today and wondering if this is caused by her new treatment?   Also reports that pt has had mild nausea today, but no vomiting. Pt did take zofran about one hour ago, which helped somewhat, but pt states that she still has some ongoing mild nausea. Denies abdominal pain.   Reports that pt felt dizzy while still laying down in bed this morning. Pt does  have history of vertigo and has meclizine at home, but states that dizziness seems to be slightly worse today. Comes and goes. Pt was able to go out and do an errand and did not have any dizziness. Pt is now back home and laying down in bed.   Pt has not noticed any decrease in urine output and urine does not appear darker or concentrated. Pt's daughter in law reports that pt only drinks about 16 ounces of fluid daily. Pt had a mild headache earlier today, but pt took tylenol and it resolved quickly. Last night she noticed some tingling in her fingers but this has resolved today.  Denies chest pain, shortness of breath, fever/chills, vision changes, numbness/tingling, severe weakness, diarrhea/constipation, or other urgent symptoms.   Her overall energy level has been decreased. She was able to eat some Cream of Wheat today.     Recommendations:     Advised that many of these symptoms could be caused by dehydration. Pt should try to drink at least 64 ounces of non-caffeinated beverages daily. Can also include some jello, popsicles, or broths for added fluids. Small sips of fluids throughout the day.  Small snacks throughout the day with bland foods.  Reviewed that pt should try compazine now, and  how to alternate antinausea medications for the remainder of the day.   Reviewed emergent symptoms that would warrant return call to clinic or evaluation in ER.  Will route to care team for review.  Regi verbalized understanding and agreement with plan.  Regi was instructed to call the clinic with any questions, concerns, or worsening symptoms.  Riri Zazueta RN on 7/30/2025 at 10:19 AM

## 2025-07-30 NOTE — TELEPHONE ENCOUNTER
Alex Parry MD to Me  Alecia Briggs RN  (Selected Message      7/30/25  4:20 PM  She should see an ROSA MARIA if symptoms get worse.     Alex Parry MD    Writer called and talked to pt and her daughter in law. They report that pt has been alternating zofran and compazine. She has  not had any vomiting, but they report that nausea has not been well managed by this. She has been able to eat some Cream of Wheat, scrambled eggs, and a small smoothie today. She will try some soup for dinner tonight. Pt's daughter in law reports that pt has had 24-30 ounces of fluid so far today, which has been an increase in her fluid intake from yesterday. Pt reports that she continues to feel thirsty with dry mouth.  Pt has been scheduled to see  Juaquin Hammond NP, on 8/1/2025 at 2:30 pm.  Will route to Juaquin Hammond NP, to see if pt should also have labs and IV fluids that day if possible.  Reviewed emergent symptoms that would warrant return call to clinic or evaluation in ER.   Advised that nurse line is available at any time by calling the main clinic number.   Patient verbalized understanding and agreement with plan.  Patient was instructed to call the clinic with any questions, concerns, or worsening symptoms.  Riri Zazueta RN on 7/30/2025 at 4:57 PM

## 2025-07-31 ENCOUNTER — INFUSION THERAPY VISIT (OUTPATIENT)
Dept: INFUSION THERAPY | Facility: CLINIC | Age: 89
End: 2025-07-31
Attending: INTERNAL MEDICINE
Payer: MEDICARE

## 2025-07-31 ENCOUNTER — ONCOLOGY VISIT (OUTPATIENT)
Dept: ONCOLOGY | Facility: CLINIC | Age: 89
End: 2025-07-31
Attending: INTERNAL MEDICINE
Payer: MEDICARE

## 2025-07-31 VITALS
SYSTOLIC BLOOD PRESSURE: 118 MMHG | TEMPERATURE: 97.9 F | HEART RATE: 70 BPM | OXYGEN SATURATION: 96 % | RESPIRATION RATE: 20 BRPM | DIASTOLIC BLOOD PRESSURE: 72 MMHG

## 2025-07-31 VITALS
RESPIRATION RATE: 20 BRPM | TEMPERATURE: 97.9 F | DIASTOLIC BLOOD PRESSURE: 72 MMHG | HEART RATE: 70 BPM | SYSTOLIC BLOOD PRESSURE: 118 MMHG

## 2025-07-31 DIAGNOSIS — Z95.828 PORT-A-CATH IN PLACE: ICD-10-CM

## 2025-07-31 DIAGNOSIS — C90.00 MULTIPLE MYELOMA NOT HAVING ACHIEVED REMISSION (H): ICD-10-CM

## 2025-07-31 DIAGNOSIS — T45.1X5A CHEMOTHERAPY-INDUCED NEUTROPENIA: Primary | ICD-10-CM

## 2025-07-31 DIAGNOSIS — C90.00 MULTIPLE MYELOMA NOT HAVING ACHIEVED REMISSION (H): Primary | ICD-10-CM

## 2025-07-31 DIAGNOSIS — D70.1 CHEMOTHERAPY-INDUCED NEUTROPENIA: Primary | ICD-10-CM

## 2025-07-31 PROCEDURE — G0463 HOSPITAL OUTPT CLINIC VISIT: HCPCS | Performed by: NURSE PRACTITIONER

## 2025-07-31 PROCEDURE — 250N000011 HC RX IP 250 OP 636: Performed by: INTERNAL MEDICINE

## 2025-07-31 PROCEDURE — 258N000003 HC RX IP 258 OP 636: Performed by: NURSE PRACTITIONER

## 2025-07-31 RX ORDER — HEPARIN SODIUM (PORCINE) LOCK FLUSH IV SOLN 100 UNIT/ML 100 UNIT/ML
5 SOLUTION INTRAVENOUS
OUTPATIENT
Start: 2025-07-31

## 2025-07-31 RX ORDER — HEPARIN SODIUM (PORCINE) LOCK FLUSH IV SOLN 100 UNIT/ML 100 UNIT/ML
5 SOLUTION INTRAVENOUS
Status: DISCONTINUED | OUTPATIENT
Start: 2025-07-31 | End: 2025-07-31 | Stop reason: HOSPADM

## 2025-07-31 RX ORDER — HEPARIN SODIUM,PORCINE 10 UNIT/ML
5-20 VIAL (ML) INTRAVENOUS DAILY PRN
OUTPATIENT
Start: 2025-07-31

## 2025-07-31 RX ORDER — HEPARIN SODIUM,PORCINE 10 UNIT/ML
5-20 VIAL (ML) INTRAVENOUS DAILY PRN
Status: CANCELLED | OUTPATIENT
Start: 2025-07-31

## 2025-07-31 RX ADMIN — SODIUM CHLORIDE 1000 ML: 0.9 INJECTION, SOLUTION INTRAVENOUS at 13:30

## 2025-07-31 RX ADMIN — Medication 5 ML: at 14:38

## 2025-07-31 ASSESSMENT — PAIN SCALES - GENERAL: PAINLEVEL_OUTOF10: NO PAIN (0)

## 2025-07-31 NOTE — LETTER
7/31/2025      Quiana Dunaway  [address]  [facility] [address]      Dear Colleague,    Thank you for referring your patient, Quiana Dunaway, to the [facility] CANCER [facility]. Please see a copy of my visit note below.    Oncology/Hematology Visit Note  Jul 31, 2025    Reason for Visit: follow up of multiple myeloma IgG kappa high risk  Diagnosed 2017  Follows Dr. Parry  Treated with Velcade Revlimid dexamethasone 10 therapy changed to daratumumab pomalidomide and dexamethasone in 2023 due to the side effects pomalidomide dose was reduced June 2022 PET scan reveals progression of the disease  Evaluated [facility] - not candidate for CAR-T cell  07/29/2025-therapy switched to carfilzomib, selinexor and dexamethasone       Current treatment  carfilzomib, selinexor and dexamethasone        Interval History:  Reports after the treatment she had nausea she was taken Zofran and alternating Compazine.  Then she was asked to take Zyprexa.  Today patient reports nausea has resolved denies abdominal pain or abdominal distention denies constipation.  Denies fever chills sweats cough shortness of breath chest pain.  Denies bleeding or bruising    Review of Systems:  14 point ROS of systems including Constitutional, Eyes, Respiratory, Cardiovascular, Gastroenterology, Genitourinary, Integumentary, Muscularskeletal, Psychiatric were all negative except for pertinent positives noted in my HPI.    Physical Examination:  Physical Exam  HENT:      Head: Normocephalic.   Eyes:      Pupils: Pupils are equal, round, and reactive to light.   Cardiovascular:      Rate and Rhythm: Normal rate.   Pulmonary:      Effort: Pulmonary effort is normal.      Breath sounds: Normal breath sounds.   Abdominal:      General: Bowel sounds are normal.      Palpations: Abdomen is soft.   Musculoskeletal:         General: Normal range of motion.      Cervical back: Normal range of motion.      Right lower leg: No  edema.   Neurological:      Mental Status: She is alert.         LABS  -CBC, CMP labs         Assessment and Plan:    multiple myeloma IgG kappa high risk  Follows with Dr. Parry  07/29/2025-therapy switched to carfilzomib, selinexor and dexamethasone   - Had some nausea with the treatment.  Zyprexa added to Compazine and Zofran  Today patient reports not having any nausea.  Patient reports she did not need to take antinausea medication today  -Status post IVF hydration today  - Nausea resolved now  - C 1 day 8 scheduled next week  Schedule with ROSA MARIA in August with treatment      Chronic anemia secondary to multiple myeloma and treatment  stable  Patient denies bleeding  -will continue to monitor      Bone health  Patient with multiple dental issues  Continue to hold Zometa    Generalized weakness  Chronic issue for the patient.  Please call with any changes or worsening of symptom      Please call clinic with any changes in health condition or question    MARIUSZ Mckeon CNP        Chart documentation with Dragon Voice recognition Software. Although reviewed after completion, some words and grammatical errors may remain.      Again, thank you for allowing me to participate in the care of your patient.        Sincerely,        MARIUSZ Mckeon CNP    Electronically signed

## 2025-07-31 NOTE — PROGRESS NOTES
Oncology/Hematology Visit Note  Jul 31, 2025    Reason for Visit: follow up of multiple myeloma IgG kappa high risk  Diagnosed 2017  Follows Dr. Parry  Treated with Velcade Revlimid dexamethasone 10 therapy changed to daratumumab pomalidomide and dexamethasone in 2023 due to the side effects pomalidomide dose was reduced June 2022 PET scan reveals progression of the disease  Evaluated [facility] - not candidate for CAR-T cell  07/29/2025-therapy switched to carfilzomib, selinexor and dexamethasone       Current treatment  carfilzomib, selinexor and dexamethasone        Interval History:  Reports after the treatment she had nausea she was taken Zofran and alternating Compazine.  Then she was asked to take Zyprexa.  Today patient reports nausea has resolved denies abdominal pain or abdominal distention denies constipation.  Denies fever chills sweats cough shortness of breath chest pain.  Denies bleeding or bruising    Review of Systems:  14 point ROS of systems including Constitutional, Eyes, Respiratory, Cardiovascular, Gastroenterology, Genitourinary, Integumentary, Muscularskeletal, Psychiatric were all negative except for pertinent positives noted in my HPI.    Physical Examination:  Physical Exam  HENT:      Head: Normocephalic.   Eyes:      Pupils: Pupils are equal, round, and reactive to light.   Cardiovascular:      Rate and Rhythm: Normal rate.   Pulmonary:      Effort: Pulmonary effort is normal.      Breath sounds: Normal breath sounds.   Abdominal:      General: Bowel sounds are normal.      Palpations: Abdomen is soft.   Musculoskeletal:         General: Normal range of motion.      Cervical back: Normal range of motion.      Right lower leg: No edema.   Neurological:      Mental Status: She is alert.         LABS  -CBC, CMP labs         Assessment and Plan:    multiple myeloma IgG kappa high risk  Follows with Dr. Parry  07/29/2025-therapy switched to carfilzomib, selinexor and  dexamethasone   - Had some nausea with the treatment.  Zyprexa added to Compazine and Zofran  Today patient reports not having any nausea.  Patient reports she did not need to take antinausea medication today  -Status post IVF hydration today  - Nausea resolved now  - C 1 day 8 scheduled next week  Schedule with ROSA MARIA in August with treatment      Chronic anemia secondary to multiple myeloma and treatment  stable  Patient denies bleeding  -will continue to monitor      Bone health  Patient with multiple dental issues  Continue to hold Zometa    Generalized weakness  Chronic issue for the patient.  Please call with any changes or worsening of symptom      Please call clinic with any changes in health condition or question    MARIUSZ Mckeon CNP        Chart documentation with Dragon Voice recognition Software. Although reviewed after completion, some words and grammatical errors may remain.

## 2025-07-31 NOTE — TELEPHONE ENCOUNTER
Juaquin has entered orders for IV fluids. No labs needed. Pt will come to clinic at 1:00 today for IV fluids.   Per Juaquin,  he will see pt today in infusion, so appt with Juaquin for tomorrow has been cancelled.   Overall, pt states that she is feeling slightly better today. States that she has a little bit more energy and her nausea seems to have improved slightly.  She reports that she still feels very thirsty and her mouth is very dry.  Pt and her daughter in law agree with plan.   Riri Zazueta RN on 7/31/2025 at 11:05 AM

## 2025-07-31 NOTE — PROGRESS NOTES
"Infusion Nursing Note:  Quiana Dunaway presents today for IVF.    Patient seen by provider today: Yes: Juaquin Hammond CNP   present during visit today: Not Applicable.    Note: pt to get 1 liter NS and see provider due to new treatment regimen side effects. Pharmacist met with pt re same including nausea med instructions. Printed materials provided for dry mouth strategies. Pt feeling better today, but has had significant nausea following treatment 7/29/25 with dry mouth. Pt had episode of \"tingling fingers/hands\" for part of Day 2 now resolved      Intravenous Access:  Implanted Port.    Treatment Conditions:  Not Applicable.      Post Infusion Assessment:  Patient tolerated infusion without incident.  Blood return noted pre and post infusion.  No evidence of extravasations.  Access discontinued per protocol.       Discharge Plan:   Discharge instructions reviewed with: Patient and Family.  Patient and/or family verbalized understanding of discharge instructions and all questions answered.  Patient discharged in stable condition accompanied by: daughter.  Departure Mode: Ambulatory.      Tara Davis RN    "

## 2025-08-04 ENCOUNTER — LAB (OUTPATIENT)
Dept: LAB | Facility: CLINIC | Age: 89
End: 2025-08-04
Payer: MEDICARE

## 2025-08-04 DIAGNOSIS — E05.90: ICD-10-CM

## 2025-08-04 DIAGNOSIS — C90.00 MULTIPLE MYELOMA NOT HAVING ACHIEVED REMISSION (H): ICD-10-CM

## 2025-08-04 LAB
BASOPHILS # BLD AUTO: 0 10E3/UL (ref 0–0.2)
BASOPHILS NFR BLD AUTO: 0 %
EOSINOPHIL # BLD AUTO: 0.1 10E3/UL (ref 0–0.7)
EOSINOPHIL NFR BLD AUTO: 1 %
ERYTHROCYTE [DISTWIDTH] IN BLOOD BY AUTOMATED COUNT: 15.8 % (ref 10–15)
HCT VFR BLD AUTO: 32.6 % (ref 35–47)
HGB BLD-MCNC: 10.6 G/DL (ref 11.7–15.7)
IMM GRANULOCYTES # BLD: 0 10E3/UL
IMM GRANULOCYTES NFR BLD: 0 %
LYMPHOCYTES # BLD AUTO: 2.5 10E3/UL (ref 0.8–5.3)
LYMPHOCYTES NFR BLD AUTO: 37 %
MCH RBC QN AUTO: 29 PG (ref 26.5–33)
MCHC RBC AUTO-ENTMCNC: 32.5 G/DL (ref 31.5–36.5)
MCV RBC AUTO: 89 FL (ref 78–100)
MONOCYTES # BLD AUTO: 1.4 10E3/UL (ref 0–1.3)
MONOCYTES NFR BLD AUTO: 20 %
NEUTROPHILS # BLD AUTO: 3 10E3/UL (ref 1.6–8.3)
NEUTROPHILS NFR BLD AUTO: 43 %
PLATELET # BLD AUTO: 114 10E3/UL (ref 150–450)
RBC # BLD AUTO: 3.66 10E6/UL (ref 3.8–5.2)
WBC # BLD AUTO: 6.9 10E3/UL (ref 4–11)

## 2025-08-04 PROCEDURE — 85025 COMPLETE CBC W/AUTO DIFF WBC: CPT

## 2025-08-04 PROCEDURE — 36415 COLL VENOUS BLD VENIPUNCTURE: CPT

## 2025-08-04 PROCEDURE — 84100 ASSAY OF PHOSPHORUS: CPT

## 2025-08-04 PROCEDURE — 80053 COMPREHEN METABOLIC PANEL: CPT

## 2025-08-04 PROCEDURE — 84443 ASSAY THYROID STIM HORMONE: CPT

## 2025-08-04 PROCEDURE — 83735 ASSAY OF MAGNESIUM: CPT

## 2025-08-04 PROCEDURE — 84439 ASSAY OF FREE THYROXINE: CPT

## 2025-08-05 ENCOUNTER — TELEPHONE (OUTPATIENT)
Dept: PHARMACY | Facility: CLINIC | Age: 89
End: 2025-08-05

## 2025-08-05 ENCOUNTER — DOCUMENTATION ONLY (OUTPATIENT)
Dept: PHARMACY | Facility: CLINIC | Age: 89
End: 2025-08-05
Payer: MEDICARE

## 2025-08-05 ENCOUNTER — TELEPHONE (OUTPATIENT)
Dept: ONCOLOGY | Facility: CLINIC | Age: 89
End: 2025-08-05

## 2025-08-05 ENCOUNTER — INFUSION THERAPY VISIT (OUTPATIENT)
Dept: INFUSION THERAPY | Facility: CLINIC | Age: 89
End: 2025-08-05
Attending: INTERNAL MEDICINE
Payer: MEDICARE

## 2025-08-05 ENCOUNTER — TELEPHONE (OUTPATIENT)
Dept: ONCOLOGY | Facility: CLINIC | Age: 89
End: 2025-08-05
Payer: MEDICARE

## 2025-08-05 ENCOUNTER — RESULTS FOLLOW-UP (OUTPATIENT)
Dept: FAMILY MEDICINE | Facility: CLINIC | Age: 89
End: 2025-08-05

## 2025-08-05 VITALS
TEMPERATURE: 98.2 F | BODY MASS INDEX: 23.05 KG/M2 | WEIGHT: 126 LBS | RESPIRATION RATE: 20 BRPM | HEART RATE: 69 BPM | OXYGEN SATURATION: 97 % | DIASTOLIC BLOOD PRESSURE: 54 MMHG | SYSTOLIC BLOOD PRESSURE: 125 MMHG

## 2025-08-05 DIAGNOSIS — C90.00 MULTIPLE MYELOMA NOT HAVING ACHIEVED REMISSION (H): Primary | ICD-10-CM

## 2025-08-05 DIAGNOSIS — E05.20 TOXIC MULTINODUL GOITER: Primary | ICD-10-CM

## 2025-08-05 DIAGNOSIS — C90.00 MULTIPLE MYELOMA NOT HAVING ACHIEVED REMISSION (H): ICD-10-CM

## 2025-08-05 DIAGNOSIS — R11.0 NAUSEA: ICD-10-CM

## 2025-08-05 LAB
ALBUMIN SERPL BCG-MCNC: 4 G/DL (ref 3.5–5.2)
ALP SERPL-CCNC: 68 U/L (ref 40–150)
ALT SERPL W P-5'-P-CCNC: 7 U/L (ref 0–50)
ANION GAP SERPL CALCULATED.3IONS-SCNC: 10 MMOL/L (ref 7–15)
AST SERPL W P-5'-P-CCNC: 14 U/L (ref 0–45)
BILIRUB SERPL-MCNC: 0.3 MG/DL
BUN SERPL-MCNC: 16.6 MG/DL (ref 8–23)
CALCIUM SERPL-MCNC: 8.9 MG/DL (ref 8.8–10.4)
CHLORIDE SERPL-SCNC: 104 MMOL/L (ref 98–107)
CREAT SERPL-MCNC: 0.72 MG/DL (ref 0.51–0.95)
EGFRCR SERPLBLD CKD-EPI 2021: 79 ML/MIN/1.73M2
GLUCOSE SERPL-MCNC: 73 MG/DL (ref 70–99)
HCO3 SERPL-SCNC: 24 MMOL/L (ref 22–29)
MAGNESIUM SERPL-MCNC: 2.1 MG/DL (ref 1.7–2.3)
PHOSPHATE SERPL-MCNC: 3 MG/DL (ref 2.5–4.5)
POTASSIUM SERPL-SCNC: 4.4 MMOL/L (ref 3.4–5.3)
PROT SERPL-MCNC: 7 G/DL (ref 6.4–8.3)
SODIUM SERPL-SCNC: 138 MMOL/L (ref 135–145)
T4 FREE SERPL-MCNC: 0.95 NG/DL (ref 0.9–1.7)
TSH SERPL DL<=0.005 MIU/L-ACNC: 4.88 UIU/ML (ref 0.3–4.2)

## 2025-08-05 PROCEDURE — 96413 CHEMO IV INFUSION 1 HR: CPT

## 2025-08-05 PROCEDURE — 250N000011 HC RX IP 250 OP 636: Performed by: INTERNAL MEDICINE

## 2025-08-05 PROCEDURE — 258N000003 HC RX IP 258 OP 636: Performed by: INTERNAL MEDICINE

## 2025-08-05 RX ORDER — LIDOCAINE AND PRILOCAINE 25; 25 MG/G; MG/G
CREAM TOPICAL DAILY PRN
Qty: 30 G | Refills: 1 | Status: SHIPPED | OUTPATIENT
Start: 2025-08-05

## 2025-08-05 RX ORDER — LIDOCAINE AND PRILOCAINE 25; 25 MG/G; MG/G
CREAM TOPICAL DAILY PRN
Qty: 30 G | Refills: 1 | Status: SHIPPED | OUTPATIENT
Start: 2025-08-05 | End: 2025-08-05

## 2025-08-05 RX ORDER — ONDANSETRON 8 MG/1
8 TABLET, FILM COATED ORAL EVERY 8 HOURS PRN
Qty: 30 TABLET | Refills: 3 | Status: SHIPPED | OUTPATIENT
Start: 2025-08-05

## 2025-08-05 RX ORDER — HEPARIN SODIUM (PORCINE) LOCK FLUSH IV SOLN 100 UNIT/ML 100 UNIT/ML
5 SOLUTION INTRAVENOUS
Status: DISCONTINUED | OUTPATIENT
Start: 2025-08-05 | End: 2025-08-05 | Stop reason: HOSPADM

## 2025-08-05 RX ADMIN — CARFILZOMIB 90 MG: 30 INJECTION, POWDER, LYOPHILIZED, FOR SOLUTION INTRAVENOUS at 09:05

## 2025-08-05 RX ADMIN — DEXTROSE 500 ML: 5 SOLUTION INTRAVENOUS at 08:36

## 2025-08-05 RX ADMIN — Medication 5 ML: at 10:26

## 2025-08-05 ASSESSMENT — PAIN SCALES - GENERAL: PAINLEVEL_OUTOF10: NO PAIN (0)

## 2025-08-06 ENCOUNTER — TELEPHONE (OUTPATIENT)
Dept: ONCOLOGY | Facility: CLINIC | Age: 89
End: 2025-08-06

## 2025-08-06 ENCOUNTER — OFFICE VISIT (OUTPATIENT)
Dept: FAMILY MEDICINE | Facility: CLINIC | Age: 89
End: 2025-08-06
Payer: MEDICARE

## 2025-08-06 VITALS
OXYGEN SATURATION: 96 % | HEIGHT: 62 IN | WEIGHT: 130.7 LBS | BODY MASS INDEX: 24.05 KG/M2 | RESPIRATION RATE: 16 BRPM | SYSTOLIC BLOOD PRESSURE: 102 MMHG | DIASTOLIC BLOOD PRESSURE: 58 MMHG | HEART RATE: 77 BPM | TEMPERATURE: 98.2 F

## 2025-08-06 DIAGNOSIS — F41.1 GAD (GENERALIZED ANXIETY DISORDER): ICD-10-CM

## 2025-08-06 DIAGNOSIS — C90.00 MULTIPLE MYELOMA NOT HAVING ACHIEVED REMISSION (H): Primary | ICD-10-CM

## 2025-08-06 DIAGNOSIS — E05.20 TOXIC MULTINODUL GOITER: ICD-10-CM

## 2025-08-06 PROCEDURE — 3078F DIAST BP <80 MM HG: CPT | Performed by: INTERNAL MEDICINE

## 2025-08-06 PROCEDURE — 1125F AMNT PAIN NOTED PAIN PRSNT: CPT | Performed by: INTERNAL MEDICINE

## 2025-08-06 PROCEDURE — 99213 OFFICE O/P EST LOW 20 MIN: CPT | Performed by: INTERNAL MEDICINE

## 2025-08-06 PROCEDURE — 3074F SYST BP LT 130 MM HG: CPT | Performed by: INTERNAL MEDICINE

## 2025-08-06 ASSESSMENT — PAIN SCALES - GENERAL: PAINLEVEL_OUTOF10: SEVERE PAIN (8)

## 2025-08-12 ENCOUNTER — INFUSION THERAPY VISIT (OUTPATIENT)
Dept: INFUSION THERAPY | Facility: CLINIC | Age: 89
End: 2025-08-12
Attending: INTERNAL MEDICINE
Payer: MEDICARE

## 2025-08-12 VITALS
RESPIRATION RATE: 18 BRPM | BODY MASS INDEX: 23.32 KG/M2 | DIASTOLIC BLOOD PRESSURE: 63 MMHG | WEIGHT: 127.5 LBS | TEMPERATURE: 97.8 F | HEART RATE: 66 BPM | SYSTOLIC BLOOD PRESSURE: 139 MMHG | OXYGEN SATURATION: 96 %

## 2025-08-12 DIAGNOSIS — C90.00 MULTIPLE MYELOMA NOT HAVING ACHIEVED REMISSION (H): Primary | ICD-10-CM

## 2025-08-12 DIAGNOSIS — E05.20 TOXIC MULTINODUL GOITER: ICD-10-CM

## 2025-08-12 DIAGNOSIS — Z95.828 PORT-A-CATH IN PLACE: ICD-10-CM

## 2025-08-12 LAB
ALBUMIN SERPL BCG-MCNC: 3.8 G/DL (ref 3.5–5.2)
ALP SERPL-CCNC: 61 U/L (ref 40–150)
ALT SERPL W P-5'-P-CCNC: 5 U/L (ref 0–50)
ANION GAP SERPL CALCULATED.3IONS-SCNC: 9 MMOL/L (ref 7–15)
AST SERPL W P-5'-P-CCNC: 9 U/L (ref 0–45)
BASOPHILS # BLD AUTO: 0 10E3/UL (ref 0–0.2)
BASOPHILS NFR BLD AUTO: 0 %
BILIRUB SERPL-MCNC: 0.6 MG/DL
BUN SERPL-MCNC: 22.4 MG/DL (ref 8–23)
CALCIUM SERPL-MCNC: 8.8 MG/DL (ref 8.8–10.4)
CHLORIDE SERPL-SCNC: 108 MMOL/L (ref 98–107)
CREAT SERPL-MCNC: 0.65 MG/DL (ref 0.51–0.95)
EGFRCR SERPLBLD CKD-EPI 2021: 84 ML/MIN/1.73M2
EOSINOPHIL # BLD AUTO: 0 10E3/UL (ref 0–0.7)
EOSINOPHIL NFR BLD AUTO: 0 %
ERYTHROCYTE [DISTWIDTH] IN BLOOD BY AUTOMATED COUNT: 16.4 % (ref 10–15)
GLUCOSE SERPL-MCNC: 118 MG/DL (ref 70–99)
HCO3 SERPL-SCNC: 25 MMOL/L (ref 22–29)
HCT VFR BLD AUTO: 30.5 % (ref 35–47)
HGB BLD-MCNC: 9.8 G/DL (ref 11.7–15.7)
IMM GRANULOCYTES # BLD: 0 10E3/UL
IMM GRANULOCYTES NFR BLD: 0 %
LYMPHOCYTES # BLD AUTO: 1.3 10E3/UL (ref 0.8–5.3)
LYMPHOCYTES NFR BLD AUTO: 25 %
MAGNESIUM SERPL-MCNC: 1.9 MG/DL (ref 1.7–2.3)
MCH RBC QN AUTO: 28.9 PG (ref 26.5–33)
MCHC RBC AUTO-ENTMCNC: 32.1 G/DL (ref 31.5–36.5)
MCV RBC AUTO: 90 FL (ref 78–100)
MONOCYTES # BLD AUTO: 1.1 10E3/UL (ref 0–1.3)
MONOCYTES NFR BLD AUTO: 22 %
NEUTROPHILS # BLD AUTO: 2.7 10E3/UL (ref 1.6–8.3)
NEUTROPHILS NFR BLD AUTO: 53 %
NRBC # BLD AUTO: 0 10E3/UL
NRBC BLD AUTO-RTO: 0 /100
PHOSPHATE SERPL-MCNC: 2.8 MG/DL (ref 2.5–4.5)
PLATELET # BLD AUTO: 91 10E3/UL (ref 150–450)
POTASSIUM SERPL-SCNC: 3.9 MMOL/L (ref 3.4–5.3)
PROT SERPL-MCNC: 6.3 G/DL (ref 6.4–8.3)
RBC # BLD AUTO: 3.39 10E6/UL (ref 3.8–5.2)
SODIUM SERPL-SCNC: 142 MMOL/L (ref 135–145)
TSH SERPL DL<=0.005 MIU/L-ACNC: 2.78 UIU/ML (ref 0.3–4.2)
WBC # BLD AUTO: 5.2 10E3/UL (ref 4–11)

## 2025-08-12 PROCEDURE — 258N000003 HC RX IP 258 OP 636: Performed by: INTERNAL MEDICINE

## 2025-08-12 PROCEDURE — 96361 HYDRATE IV INFUSION ADD-ON: CPT

## 2025-08-12 PROCEDURE — 96413 CHEMO IV INFUSION 1 HR: CPT

## 2025-08-12 PROCEDURE — 84443 ASSAY THYROID STIM HORMONE: CPT | Performed by: INTERNAL MEDICINE

## 2025-08-12 PROCEDURE — 85004 AUTOMATED DIFF WBC COUNT: CPT | Performed by: INTERNAL MEDICINE

## 2025-08-12 PROCEDURE — 84155 ASSAY OF PROTEIN SERUM: CPT | Performed by: INTERNAL MEDICINE

## 2025-08-12 PROCEDURE — 250N000011 HC RX IP 250 OP 636: Mod: JZ | Performed by: INTERNAL MEDICINE

## 2025-08-12 PROCEDURE — 83735 ASSAY OF MAGNESIUM: CPT | Performed by: INTERNAL MEDICINE

## 2025-08-12 PROCEDURE — 84100 ASSAY OF PHOSPHORUS: CPT | Performed by: INTERNAL MEDICINE

## 2025-08-12 PROCEDURE — 36593 DECLOT VASCULAR DEVICE: CPT

## 2025-08-12 RX ORDER — HEPARIN SODIUM (PORCINE) LOCK FLUSH IV SOLN 100 UNIT/ML 100 UNIT/ML
5 SOLUTION INTRAVENOUS
OUTPATIENT
Start: 2025-08-12

## 2025-08-12 RX ORDER — HEPARIN SODIUM (PORCINE) LOCK FLUSH IV SOLN 100 UNIT/ML 100 UNIT/ML
5 SOLUTION INTRAVENOUS
Status: DISCONTINUED | OUTPATIENT
Start: 2025-08-12 | End: 2025-08-12 | Stop reason: HOSPADM

## 2025-08-12 RX ORDER — HEPARIN SODIUM,PORCINE 10 UNIT/ML
5-20 VIAL (ML) INTRAVENOUS DAILY PRN
OUTPATIENT
Start: 2025-08-12

## 2025-08-12 RX ADMIN — CARFILZOMIB 90 MG: 30 INJECTION, POWDER, LYOPHILIZED, FOR SOLUTION INTRAVENOUS at 10:21

## 2025-08-12 RX ADMIN — ALTEPLASE 2 MG: 2.2 INJECTION, POWDER, LYOPHILIZED, FOR SOLUTION INTRAVENOUS at 08:40

## 2025-08-12 RX ADMIN — Medication 5 ML: at 11:30

## 2025-08-12 RX ADMIN — DEXTROSE MONOHYDRATE 500 ML: 50 INJECTION, SOLUTION INTRAVENOUS at 09:40

## 2025-08-13 ENCOUNTER — DOCUMENTATION ONLY (OUTPATIENT)
Dept: PHARMACY | Facility: CLINIC | Age: 89
End: 2025-08-13
Payer: MEDICARE

## 2025-08-15 DIAGNOSIS — C90.00 MULTIPLE MYELOMA NOT HAVING ACHIEVED REMISSION (H): ICD-10-CM

## 2025-08-17 RX ORDER — VITAMIN B COMPLEX
1 TABLET ORAL DAILY
Qty: 90 TABLET | Refills: 1 | Status: SHIPPED | OUTPATIENT
Start: 2025-08-17

## 2025-08-19 DIAGNOSIS — C90.00 MULTIPLE MYELOMA NOT HAVING ACHIEVED REMISSION (H): Primary | ICD-10-CM

## 2025-08-19 RX ORDER — DEXAMETHASONE 4 MG/1
20 TABLET ORAL WEEKLY
Qty: 20 TABLET | Refills: 0 | Status: SHIPPED | OUTPATIENT
Start: 2025-08-26

## 2025-08-19 RX ORDER — SELINEXOR 40 MG/1
40 TABLET, FILM COATED ORAL WEEKLY
Qty: 4 EACH | Refills: 0 | Status: SHIPPED | OUTPATIENT
Start: 2025-08-26

## 2025-08-20 DIAGNOSIS — I50.32 CHRONIC DIASTOLIC CONGESTIVE HEART FAILURE (H): ICD-10-CM

## 2025-08-20 RX ORDER — POTASSIUM CHLORIDE 750 MG/1
10 TABLET, EXTENDED RELEASE ORAL DAILY
Qty: 90 TABLET | Refills: 0 | Status: SHIPPED | OUTPATIENT
Start: 2025-08-20

## 2025-08-21 ENCOUNTER — DOCUMENTATION ONLY (OUTPATIENT)
Dept: ONCOLOGY | Facility: CLINIC | Age: 89
End: 2025-08-21
Payer: MEDICARE

## 2025-08-26 ENCOUNTER — INFUSION THERAPY VISIT (OUTPATIENT)
Dept: INFUSION THERAPY | Facility: CLINIC | Age: 89
End: 2025-08-26
Attending: INTERNAL MEDICINE
Payer: MEDICARE

## 2025-08-26 VITALS
OXYGEN SATURATION: 95 % | HEART RATE: 85 BPM | BODY MASS INDEX: 22.75 KG/M2 | RESPIRATION RATE: 16 BRPM | SYSTOLIC BLOOD PRESSURE: 160 MMHG | TEMPERATURE: 98.1 F | WEIGHT: 124.4 LBS | DIASTOLIC BLOOD PRESSURE: 88 MMHG

## 2025-08-26 DIAGNOSIS — C90.00 MULTIPLE MYELOMA NOT HAVING ACHIEVED REMISSION (H): Primary | ICD-10-CM

## 2025-08-26 LAB
ALBUMIN SERPL BCG-MCNC: 4.3 G/DL (ref 3.5–5.2)
ALP SERPL-CCNC: 65 U/L (ref 40–150)
ALT SERPL W P-5'-P-CCNC: 6 U/L (ref 0–50)
ANION GAP SERPL CALCULATED.3IONS-SCNC: 9 MMOL/L (ref 7–15)
AST SERPL W P-5'-P-CCNC: 10 U/L (ref 0–45)
BASOPHILS # BLD AUTO: <0.03 10E3/UL (ref 0–0.2)
BASOPHILS NFR BLD AUTO: 0.3 %
BILIRUB SERPL-MCNC: 0.7 MG/DL
BUN SERPL-MCNC: 22.2 MG/DL (ref 8–23)
CALCIUM SERPL-MCNC: 9.2 MG/DL (ref 8.8–10.4)
CHLORIDE SERPL-SCNC: 106 MMOL/L (ref 98–107)
CREAT SERPL-MCNC: 0.7 MG/DL (ref 0.51–0.95)
EGFRCR SERPLBLD CKD-EPI 2021: 82 ML/MIN/1.73M2
EOSINOPHIL # BLD AUTO: <0.03 10E3/UL (ref 0–0.7)
EOSINOPHIL NFR BLD AUTO: 0.2 %
ERYTHROCYTE [DISTWIDTH] IN BLOOD BY AUTOMATED COUNT: 16.1 % (ref 10–15)
GLUCOSE SERPL-MCNC: 111 MG/DL (ref 70–99)
HCO3 SERPL-SCNC: 25 MMOL/L (ref 22–29)
HCT VFR BLD AUTO: 34.8 % (ref 35–47)
HGB BLD-MCNC: 11.5 G/DL (ref 11.7–15.7)
IMM GRANULOCYTES # BLD: 0.05 10E3/UL
IMM GRANULOCYTES NFR BLD: 0.8 %
LYMPHOCYTES # BLD AUTO: 0.57 10E3/UL (ref 0.8–5.3)
LYMPHOCYTES NFR BLD AUTO: 8.6 %
MAGNESIUM SERPL-MCNC: 2 MG/DL (ref 1.7–2.3)
MCH RBC QN AUTO: 29.8 PG (ref 26.5–33)
MCHC RBC AUTO-ENTMCNC: 33 G/DL (ref 31.5–36.5)
MCV RBC AUTO: 90.2 FL (ref 78–100)
MONOCYTES # BLD AUTO: 0.28 10E3/UL (ref 0–1.3)
MONOCYTES NFR BLD AUTO: 4.2 %
NEUTROPHILS # BLD AUTO: 5.72 10E3/UL (ref 1.6–8.3)
NEUTROPHILS NFR BLD AUTO: 85.9 %
NRBC # BLD AUTO: <0.03 10E3/UL
NRBC BLD AUTO-RTO: 0 /100
PHOSPHATE SERPL-MCNC: 2.8 MG/DL (ref 2.5–4.5)
PLATELET # BLD AUTO: 216 10E3/UL (ref 150–450)
POTASSIUM SERPL-SCNC: 4.6 MMOL/L (ref 3.4–5.3)
PROT SERPL-MCNC: 6.3 G/DL (ref 6.4–8.3)
PROT SERPL-MCNC: 6.5 G/DL (ref 6.4–8.3)
RBC # BLD AUTO: 3.86 10E6/UL (ref 3.8–5.2)
SODIUM SERPL-SCNC: 140 MMOL/L (ref 135–145)
WBC # BLD AUTO: 6.65 10E3/UL (ref 4–11)

## 2025-08-26 PROCEDURE — 36591 DRAW BLOOD OFF VENOUS DEVICE: CPT | Performed by: INTERNAL MEDICINE

## 2025-08-26 PROCEDURE — 250N000011 HC RX IP 250 OP 636: Performed by: INTERNAL MEDICINE

## 2025-08-26 PROCEDURE — 258N000003 HC RX IP 258 OP 636: Performed by: INTERNAL MEDICINE

## 2025-08-26 PROCEDURE — 96413 CHEMO IV INFUSION 1 HR: CPT

## 2025-08-26 RX ORDER — HEPARIN SODIUM (PORCINE) LOCK FLUSH IV SOLN 100 UNIT/ML 100 UNIT/ML
5 SOLUTION INTRAVENOUS
Status: DISCONTINUED | OUTPATIENT
Start: 2025-08-26 | End: 2025-08-26 | Stop reason: HOSPADM

## 2025-08-26 RX ADMIN — Medication 5 ML: at 12:00

## 2025-08-26 RX ADMIN — CARFILZOMIB 90 MG: 30 INJECTION, POWDER, LYOPHILIZED, FOR SOLUTION INTRAVENOUS at 11:19

## 2025-08-26 RX ADMIN — DEXTROSE 250 ML: 5 SOLUTION INTRAVENOUS at 11:18

## 2025-08-26 ASSESSMENT — PAIN SCALES - GENERAL: PAINLEVEL_OUTOF10: NO PAIN (0)

## 2025-08-27 LAB
IGG SERPL-MCNC: 666 MG/DL (ref 610–1616)
KAPPA LC FREE SER-MCNC: 8.66 MG/DL (ref 0.33–1.94)
KAPPA LC FREE/LAMBDA FREE SER NEPH: 50.94 {RATIO} (ref 0.26–1.65)
LAMBDA LC FREE SERPL-MCNC: 0.17 MG/DL (ref 0.57–2.63)

## 2025-08-28 LAB
ALBUMIN SERPL ELPH-MCNC: 4.2 G/DL (ref 3.7–5.1)
ALPHA1 GLOB SERPL ELPH-MCNC: 0.3 G/DL (ref 0.2–0.4)
ALPHA2 GLOB SERPL ELPH-MCNC: 0.7 G/DL (ref 0.5–0.9)
B-GLOBULIN SERPL ELPH-MCNC: 0.6 G/DL (ref 0.6–1)
GAMMA GLOB SERPL ELPH-MCNC: 0.7 G/DL (ref 0.7–1.6)
LOCATION OF TASK: ABNORMAL
M PROTEIN SERPL ELPH-MCNC: 0.5 G/DL
PROT PATTERN SERPL ELPH-IMP: ABNORMAL

## 2025-09-03 ENCOUNTER — INFUSION THERAPY VISIT (OUTPATIENT)
Dept: INFUSION THERAPY | Facility: CLINIC | Age: 89
End: 2025-09-03
Attending: INTERNAL MEDICINE
Payer: MEDICARE

## 2025-09-03 ENCOUNTER — DOCUMENTATION ONLY (OUTPATIENT)
Dept: ONCOLOGY | Facility: CLINIC | Age: 89
End: 2025-09-03

## 2025-09-03 VITALS
TEMPERATURE: 98.1 F | OXYGEN SATURATION: 96 % | WEIGHT: 126.8 LBS | RESPIRATION RATE: 16 BRPM | BODY MASS INDEX: 23.19 KG/M2 | DIASTOLIC BLOOD PRESSURE: 75 MMHG | SYSTOLIC BLOOD PRESSURE: 130 MMHG | HEART RATE: 89 BPM

## 2025-09-03 DIAGNOSIS — C90.00 MULTIPLE MYELOMA NOT HAVING ACHIEVED REMISSION (H): Primary | ICD-10-CM

## 2025-09-03 DIAGNOSIS — C90.00 MULTIPLE MYELOMA NOT HAVING ACHIEVED REMISSION (H): ICD-10-CM

## 2025-09-03 LAB
ALBUMIN SERPL BCG-MCNC: 4 G/DL (ref 3.5–5.2)
ALP SERPL-CCNC: 66 U/L (ref 40–150)
ALT SERPL W P-5'-P-CCNC: 12 U/L (ref 0–50)
ANION GAP SERPL CALCULATED.3IONS-SCNC: 10 MMOL/L (ref 7–15)
AST SERPL W P-5'-P-CCNC: 10 U/L (ref 0–45)
BASOPHILS # BLD AUTO: <0.03 10E3/UL (ref 0–0.2)
BASOPHILS NFR BLD AUTO: 0.3 %
BILIRUB SERPL-MCNC: 0.6 MG/DL
BUN SERPL-MCNC: 18.8 MG/DL (ref 8–23)
CALCIUM SERPL-MCNC: 9.1 MG/DL (ref 8.8–10.4)
CHLORIDE SERPL-SCNC: 108 MMOL/L (ref 98–107)
CREAT SERPL-MCNC: 0.64 MG/DL (ref 0.51–0.95)
EGFRCR SERPLBLD CKD-EPI 2021: 84 ML/MIN/1.73M2
EOSINOPHIL # BLD AUTO: <0.03 10E3/UL (ref 0–0.7)
EOSINOPHIL NFR BLD AUTO: 0.2 %
ERYTHROCYTE [DISTWIDTH] IN BLOOD BY AUTOMATED COUNT: 15.8 % (ref 10–15)
GLUCOSE SERPL-MCNC: 130 MG/DL (ref 70–99)
HCO3 SERPL-SCNC: 24 MMOL/L (ref 22–29)
HCT VFR BLD AUTO: 34.5 % (ref 35–47)
HGB BLD-MCNC: 11.3 G/DL (ref 11.7–15.7)
IMM GRANULOCYTES # BLD: 0.03 10E3/UL
IMM GRANULOCYTES NFR BLD: 0.5 %
LYMPHOCYTES # BLD AUTO: 0.71 10E3/UL (ref 0.8–5.3)
LYMPHOCYTES NFR BLD AUTO: 11.5 %
MAGNESIUM SERPL-MCNC: 2 MG/DL (ref 1.7–2.3)
MCH RBC QN AUTO: 29.7 PG (ref 26.5–33)
MCHC RBC AUTO-ENTMCNC: 32.8 G/DL (ref 31.5–36.5)
MCV RBC AUTO: 90.8 FL (ref 78–100)
MONOCYTES # BLD AUTO: 0.74 10E3/UL (ref 0–1.3)
MONOCYTES NFR BLD AUTO: 12 %
NEUTROPHILS # BLD AUTO: 4.65 10E3/UL (ref 1.6–8.3)
NEUTROPHILS NFR BLD AUTO: 75.5 %
NRBC # BLD AUTO: <0.03 10E3/UL
NRBC BLD AUTO-RTO: 0 /100
PHOSPHATE SERPL-MCNC: 2.4 MG/DL (ref 2.5–4.5)
PLATELET # BLD AUTO: 142 10E3/UL (ref 150–450)
POTASSIUM SERPL-SCNC: 4.3 MMOL/L (ref 3.4–5.3)
PROT SERPL-MCNC: 6.1 G/DL (ref 6.4–8.3)
RBC # BLD AUTO: 3.8 10E6/UL (ref 3.8–5.2)
SODIUM SERPL-SCNC: 142 MMOL/L (ref 135–145)
WBC # BLD AUTO: 6.16 10E3/UL (ref 4–11)

## 2025-09-03 PROCEDURE — 80053 COMPREHEN METABOLIC PANEL: CPT | Performed by: INTERNAL MEDICINE

## 2025-09-03 PROCEDURE — 96413 CHEMO IV INFUSION 1 HR: CPT

## 2025-09-03 PROCEDURE — 250N000011 HC RX IP 250 OP 636: Performed by: INTERNAL MEDICINE

## 2025-09-03 PROCEDURE — 83735 ASSAY OF MAGNESIUM: CPT | Performed by: INTERNAL MEDICINE

## 2025-09-03 PROCEDURE — 36591 DRAW BLOOD OFF VENOUS DEVICE: CPT

## 2025-09-03 PROCEDURE — 84100 ASSAY OF PHOSPHORUS: CPT | Performed by: INTERNAL MEDICINE

## 2025-09-03 PROCEDURE — 258N000003 HC RX IP 258 OP 636: Performed by: INTERNAL MEDICINE

## 2025-09-03 PROCEDURE — 85004 AUTOMATED DIFF WBC COUNT: CPT | Performed by: INTERNAL MEDICINE

## 2025-09-03 RX ORDER — HEPARIN SODIUM (PORCINE) LOCK FLUSH IV SOLN 100 UNIT/ML 100 UNIT/ML
5 SOLUTION INTRAVENOUS
Status: DISCONTINUED | OUTPATIENT
Start: 2025-09-03 | End: 2025-09-03 | Stop reason: HOSPADM

## 2025-09-03 RX ADMIN — CARFILZOMIB 90 MG: 30 INJECTION, POWDER, LYOPHILIZED, FOR SOLUTION INTRAVENOUS at 10:45

## 2025-09-03 RX ADMIN — DEXTROSE 250 ML: 5 SOLUTION INTRAVENOUS at 10:45

## 2025-09-03 RX ADMIN — Medication 5 ML: at 11:22

## 2025-09-04 DIAGNOSIS — C90.00 MULTIPLE MYELOMA NOT HAVING ACHIEVED REMISSION (H): ICD-10-CM

## 2025-09-04 RX ORDER — LORAZEPAM 0.5 MG/1
0.5 TABLET ORAL EVERY 6 HOURS PRN
Qty: 30 TABLET | Refills: 3 | Status: SHIPPED | OUTPATIENT
Start: 2025-09-04

## (undated) DEVICE — SPECIMEN CONTAINER 60MLW/10% FORMALIN 59601

## (undated) DEVICE — PEN MARKING SKIN

## (undated) RX ORDER — LIDOCAINE HYDROCHLORIDE 10 MG/ML
INJECTION, SOLUTION EPIDURAL; INFILTRATION; INTRACAUDAL; PERINEURAL
Status: DISPENSED
Start: 2017-03-27

## (undated) RX ORDER — CEFAZOLIN SODIUM 2 G/50ML
SOLUTION INTRAVENOUS
Status: DISPENSED
Start: 2025-07-25

## (undated) RX ORDER — HYDROMORPHONE HYDROCHLORIDE 1 MG/ML
INJECTION, SOLUTION INTRAMUSCULAR; INTRAVENOUS; SUBCUTANEOUS
Status: DISPENSED
Start: 2017-03-27

## (undated) RX ORDER — BUPIVACAINE HYDROCHLORIDE 2.5 MG/ML
INJECTION, SOLUTION EPIDURAL; INFILTRATION; INTRACAUDAL
Status: DISPENSED
Start: 2017-03-27

## (undated) RX ORDER — REGADENOSON 0.08 MG/ML
INJECTION, SOLUTION INTRAVENOUS
Status: DISPENSED
Start: 2017-04-19

## (undated) RX ORDER — LIDOCAINE HYDROCHLORIDE 10 MG/ML
INJECTION, SOLUTION INFILTRATION; PERINEURAL
Status: DISPENSED
Start: 2025-07-25

## (undated) RX ORDER — FENTANYL CITRATE 50 UG/ML
INJECTION, SOLUTION INTRAMUSCULAR; INTRAVENOUS
Status: DISPENSED
Start: 2017-03-27

## (undated) RX ORDER — HEPARIN SODIUM (PORCINE) LOCK FLUSH IV SOLN 100 UNIT/ML 100 UNIT/ML
SOLUTION INTRAVENOUS
Status: DISPENSED
Start: 2025-07-25

## (undated) RX ORDER — ONDANSETRON 2 MG/ML
INJECTION INTRAMUSCULAR; INTRAVENOUS
Status: DISPENSED
Start: 2017-07-12

## (undated) RX ORDER — REGADENOSON 0.08 MG/ML
INJECTION, SOLUTION INTRAVENOUS
Status: DISPENSED
Start: 2020-05-26